# Patient Record
Sex: FEMALE | Race: OTHER | HISPANIC OR LATINO
[De-identification: names, ages, dates, MRNs, and addresses within clinical notes are randomized per-mention and may not be internally consistent; named-entity substitution may affect disease eponyms.]

---

## 2017-03-24 ENCOUNTER — APPOINTMENT (OUTPATIENT)
Dept: CARDIOLOGY | Facility: CLINIC | Age: 57
End: 2017-03-24

## 2017-03-24 DIAGNOSIS — I10 ESSENTIAL (PRIMARY) HYPERTENSION: ICD-10-CM

## 2017-03-27 ENCOUNTER — APPOINTMENT (OUTPATIENT)
Dept: CARDIOLOGY | Facility: CLINIC | Age: 57
End: 2017-03-27

## 2017-05-19 PROBLEM — I10 BENIGN ESSENTIAL HYPERTENSION: Status: RESOLVED | Noted: 2017-05-19 | Resolved: 2017-05-19

## 2018-10-08 ENCOUNTER — APPOINTMENT (OUTPATIENT)
Dept: GASTROENTEROLOGY | Facility: CLINIC | Age: 58
End: 2018-10-08

## 2019-02-22 ENCOUNTER — INPATIENT (INPATIENT)
Facility: HOSPITAL | Age: 59
LOS: 30 days | Discharge: NURSING, REHAB OR LONG-TERM CARE | DRG: 394 | End: 2019-03-25
Attending: OBSTETRICS & GYNECOLOGY | Admitting: OBSTETRICS & GYNECOLOGY
Payer: MEDICARE

## 2019-02-22 VITALS
DIASTOLIC BLOOD PRESSURE: 93 MMHG | WEIGHT: 136.03 LBS | TEMPERATURE: 97 F | SYSTOLIC BLOOD PRESSURE: 141 MMHG | RESPIRATION RATE: 17 BRPM | OXYGEN SATURATION: 96 % | HEART RATE: 91 BPM | HEIGHT: 61 IN

## 2019-02-22 LAB
ALBUMIN SERPL ELPH-MCNC: 3.1 G/DL — LOW (ref 3.3–5)
ALP SERPL-CCNC: 69 U/L — SIGNIFICANT CHANGE UP (ref 40–120)
ALT FLD-CCNC: 9 U/L — LOW (ref 10–45)
ANION GAP SERPL CALC-SCNC: 11 MMOL/L — SIGNIFICANT CHANGE UP (ref 5–17)
APTT BLD: 30.9 SEC — SIGNIFICANT CHANGE UP (ref 27.5–36.3)
AST SERPL-CCNC: 10 U/L — SIGNIFICANT CHANGE UP (ref 10–40)
BASOPHILS # BLD AUTO: 0.01 K/UL — SIGNIFICANT CHANGE UP (ref 0–0.2)
BASOPHILS NFR BLD AUTO: 0.2 % — SIGNIFICANT CHANGE UP (ref 0–2)
BILIRUB SERPL-MCNC: 0.2 MG/DL — SIGNIFICANT CHANGE UP (ref 0.2–1.2)
BUN SERPL-MCNC: 9 MG/DL — SIGNIFICANT CHANGE UP (ref 7–23)
CALCIUM SERPL-MCNC: 9.1 MG/DL — SIGNIFICANT CHANGE UP (ref 8.4–10.5)
CHLORIDE SERPL-SCNC: 102 MMOL/L — SIGNIFICANT CHANGE UP (ref 96–108)
CO2 SERPL-SCNC: 27 MMOL/L — SIGNIFICANT CHANGE UP (ref 22–31)
CREAT SERPL-MCNC: 0.78 MG/DL — SIGNIFICANT CHANGE UP (ref 0.5–1.3)
EOSINOPHIL # BLD AUTO: 0.03 K/UL — SIGNIFICANT CHANGE UP (ref 0–0.5)
EOSINOPHIL NFR BLD AUTO: 0.6 % — SIGNIFICANT CHANGE UP (ref 0–6)
GLUCOSE SERPL-MCNC: 197 MG/DL — HIGH (ref 70–99)
HCT VFR BLD CALC: 26.2 % — LOW (ref 34.5–45)
HGB BLD-MCNC: 8.3 G/DL — LOW (ref 11.5–15.5)
IMM GRANULOCYTES NFR BLD AUTO: 0.6 % — SIGNIFICANT CHANGE UP (ref 0–1.5)
INR BLD: 1.06 — SIGNIFICANT CHANGE UP (ref 0.88–1.16)
LYMPHOCYTES # BLD AUTO: 1.2 K/UL — SIGNIFICANT CHANGE UP (ref 1–3.3)
LYMPHOCYTES # BLD AUTO: 24.5 % — SIGNIFICANT CHANGE UP (ref 13–44)
MCHC RBC-ENTMCNC: 29.2 PG — SIGNIFICANT CHANGE UP (ref 27–34)
MCHC RBC-ENTMCNC: 31.7 GM/DL — LOW (ref 32–36)
MCV RBC AUTO: 92.3 FL — SIGNIFICANT CHANGE UP (ref 80–100)
MONOCYTES # BLD AUTO: 0.62 K/UL — SIGNIFICANT CHANGE UP (ref 0–0.9)
MONOCYTES NFR BLD AUTO: 12.7 % — SIGNIFICANT CHANGE UP (ref 2–14)
NEUTROPHILS # BLD AUTO: 3.01 K/UL — SIGNIFICANT CHANGE UP (ref 1.8–7.4)
NEUTROPHILS NFR BLD AUTO: 61.4 % — SIGNIFICANT CHANGE UP (ref 43–77)
NRBC # BLD: 0 /100 WBCS — SIGNIFICANT CHANGE UP (ref 0–0)
PLATELET # BLD AUTO: 424 K/UL — HIGH (ref 150–400)
POTASSIUM SERPL-MCNC: 3.4 MMOL/L — LOW (ref 3.5–5.3)
POTASSIUM SERPL-SCNC: 3.4 MMOL/L — LOW (ref 3.5–5.3)
PROT SERPL-MCNC: 6.7 G/DL — SIGNIFICANT CHANGE UP (ref 6–8.3)
PROTHROM AB SERPL-ACNC: 12 SEC — SIGNIFICANT CHANGE UP (ref 10–12.9)
RBC # BLD: 2.84 M/UL — LOW (ref 3.8–5.2)
RBC # FLD: 13.9 % — SIGNIFICANT CHANGE UP (ref 10.3–14.5)
RH IG SCN BLD-IMP: POSITIVE — SIGNIFICANT CHANGE UP
RH IG SCN BLD-IMP: POSITIVE — SIGNIFICANT CHANGE UP
SODIUM SERPL-SCNC: 140 MMOL/L — SIGNIFICANT CHANGE UP (ref 135–145)
WBC # BLD: 4.9 K/UL — SIGNIFICANT CHANGE UP (ref 3.8–10.5)
WBC # FLD AUTO: 4.9 K/UL — SIGNIFICANT CHANGE UP (ref 3.8–10.5)

## 2019-02-22 PROCEDURE — 99223 1ST HOSP IP/OBS HIGH 75: CPT

## 2019-02-23 LAB
ALBUMIN SERPL ELPH-MCNC: 2.3 G/DL — LOW (ref 3.3–5)
ALBUMIN SERPL ELPH-MCNC: 2.3 G/DL — LOW (ref 3.3–5)
ALP SERPL-CCNC: 57 U/L — SIGNIFICANT CHANGE UP (ref 40–120)
ALT FLD-CCNC: 6 U/L — LOW (ref 10–45)
ANION GAP SERPL CALC-SCNC: 14 MMOL/L — SIGNIFICANT CHANGE UP (ref 5–17)
APPEARANCE UR: CLEAR — SIGNIFICANT CHANGE UP
APTT BLD: 26.8 SEC — LOW (ref 27.5–36.3)
AST SERPL-CCNC: 9 U/L — LOW (ref 10–40)
BILIRUB SERPL-MCNC: 0.4 MG/DL — SIGNIFICANT CHANGE UP (ref 0.2–1.2)
BILIRUB UR-MCNC: NEGATIVE — SIGNIFICANT CHANGE UP
BLD GP AB SCN SERPL QL: NEGATIVE — SIGNIFICANT CHANGE UP
BLD GP AB SCN SERPL QL: NEGATIVE — SIGNIFICANT CHANGE UP
BUN SERPL-MCNC: 7 MG/DL — SIGNIFICANT CHANGE UP (ref 7–23)
CALCIUM SERPL-MCNC: 8.4 MG/DL — SIGNIFICANT CHANGE UP (ref 8.4–10.5)
CHLORIDE SERPL-SCNC: 101 MMOL/L — SIGNIFICANT CHANGE UP (ref 96–108)
CO2 SERPL-SCNC: 23 MMOL/L — SIGNIFICANT CHANGE UP (ref 22–31)
COLOR SPEC: YELLOW — SIGNIFICANT CHANGE UP
CREAT SERPL-MCNC: 0.54 MG/DL — SIGNIFICANT CHANGE UP (ref 0.5–1.3)
DIFF PNL FLD: NEGATIVE — SIGNIFICANT CHANGE UP
GLUCOSE BLDC GLUCOMTR-MCNC: 130 MG/DL — HIGH (ref 70–99)
GLUCOSE BLDC GLUCOMTR-MCNC: 133 MG/DL — HIGH (ref 70–99)
GLUCOSE BLDC GLUCOMTR-MCNC: 140 MG/DL — HIGH (ref 70–99)
GLUCOSE BLDC GLUCOMTR-MCNC: 175 MG/DL — HIGH (ref 70–99)
GLUCOSE BLDC GLUCOMTR-MCNC: 188 MG/DL — HIGH (ref 70–99)
GLUCOSE SERPL-MCNC: 200 MG/DL — HIGH (ref 70–99)
GLUCOSE UR QL: NEGATIVE — SIGNIFICANT CHANGE UP
HBA1C BLD-MCNC: 6.9 % — HIGH (ref 4–5.6)
HCT VFR BLD CALC: 23.8 % — LOW (ref 34.5–45)
HCV AB S/CO SERPL IA: 0.14 S/CO — SIGNIFICANT CHANGE UP
HCV AB SERPL-IMP: SIGNIFICANT CHANGE UP
HGB BLD-MCNC: 7.2 G/DL — LOW (ref 11.5–15.5)
INR BLD: 1.13 — SIGNIFICANT CHANGE UP (ref 0.88–1.16)
KETONES UR-MCNC: NEGATIVE — SIGNIFICANT CHANGE UP
LEUKOCYTE ESTERASE UR-ACNC: ABNORMAL
MAGNESIUM SERPL-MCNC: 1.4 MG/DL — LOW (ref 1.6–2.6)
MCHC RBC-ENTMCNC: 28.7 PG — SIGNIFICANT CHANGE UP (ref 27–34)
MCHC RBC-ENTMCNC: 30.3 GM/DL — LOW (ref 32–36)
MCV RBC AUTO: 94.8 FL — SIGNIFICANT CHANGE UP (ref 80–100)
NITRITE UR-MCNC: NEGATIVE — SIGNIFICANT CHANGE UP
NRBC # BLD: 0 /100 WBCS — SIGNIFICANT CHANGE UP (ref 0–0)
PH UR: 6 — SIGNIFICANT CHANGE UP (ref 5–8)
PHOSPHATE SERPL-MCNC: 2.7 MG/DL — SIGNIFICANT CHANGE UP (ref 2.5–4.5)
PLATELET # BLD AUTO: 359 K/UL — SIGNIFICANT CHANGE UP (ref 150–400)
POTASSIUM SERPL-MCNC: 3.6 MMOL/L — SIGNIFICANT CHANGE UP (ref 3.5–5.3)
POTASSIUM SERPL-SCNC: 3.6 MMOL/L — SIGNIFICANT CHANGE UP (ref 3.5–5.3)
PROT SERPL-MCNC: 5.1 G/DL — LOW (ref 6–8.3)
PROT UR-MCNC: NEGATIVE MG/DL — SIGNIFICANT CHANGE UP
PROTHROM AB SERPL-ACNC: 12.8 SEC — SIGNIFICANT CHANGE UP (ref 10–12.9)
RBC # BLD: 2.51 M/UL — LOW (ref 3.8–5.2)
RBC # FLD: 14.1 % — SIGNIFICANT CHANGE UP (ref 10.3–14.5)
SODIUM SERPL-SCNC: 138 MMOL/L — SIGNIFICANT CHANGE UP (ref 135–145)
SP GR SPEC: <=1.005 — SIGNIFICANT CHANGE UP (ref 1–1.03)
UROBILINOGEN FLD QL: 0.2 E.U./DL — SIGNIFICANT CHANGE UP
WBC # BLD: 3.14 K/UL — LOW (ref 3.8–10.5)
WBC # FLD AUTO: 3.14 K/UL — LOW (ref 3.8–10.5)

## 2019-02-23 PROCEDURE — 72196 MRI PELVIS W/DYE: CPT | Mod: 26

## 2019-02-23 RX ORDER — GLUCAGON INJECTION, SOLUTION 0.5 MG/.1ML
1 INJECTION, SOLUTION SUBCUTANEOUS ONCE
Qty: 0 | Refills: 0 | Status: DISCONTINUED | OUTPATIENT
Start: 2019-02-23 | End: 2019-03-25

## 2019-02-23 RX ORDER — DEXTROSE 50 % IN WATER 50 %
25 SYRINGE (ML) INTRAVENOUS ONCE
Qty: 0 | Refills: 0 | Status: DISCONTINUED | OUTPATIENT
Start: 2019-02-23 | End: 2019-03-25

## 2019-02-23 RX ORDER — MAGNESIUM SULFATE 500 MG/ML
4 VIAL (ML) INJECTION ONCE
Qty: 0 | Refills: 0 | Status: COMPLETED | OUTPATIENT
Start: 2019-02-23 | End: 2019-02-23

## 2019-02-23 RX ORDER — SODIUM CHLORIDE 9 MG/ML
1000 INJECTION, SOLUTION INTRAVENOUS
Qty: 0 | Refills: 0 | Status: DISCONTINUED | OUTPATIENT
Start: 2019-02-23 | End: 2019-03-17

## 2019-02-23 RX ORDER — ZINC OXIDE 200 MG/G
1 OINTMENT TOPICAL EVERY 6 HOURS
Qty: 0 | Refills: 0 | Status: DISCONTINUED | OUTPATIENT
Start: 2019-02-23 | End: 2019-02-27

## 2019-02-23 RX ORDER — DEXTROSE 50 % IN WATER 50 %
12.5 SYRINGE (ML) INTRAVENOUS ONCE
Qty: 0 | Refills: 0 | Status: DISCONTINUED | OUTPATIENT
Start: 2019-02-23 | End: 2019-03-14

## 2019-02-23 RX ORDER — DEXTROSE 50 % IN WATER 50 %
25 SYRINGE (ML) INTRAVENOUS ONCE
Qty: 0 | Refills: 0 | Status: DISCONTINUED | OUTPATIENT
Start: 2019-02-23 | End: 2019-03-14

## 2019-02-23 RX ORDER — DEXTROSE 50 % IN WATER 50 %
15 SYRINGE (ML) INTRAVENOUS ONCE
Qty: 0 | Refills: 0 | Status: DISCONTINUED | OUTPATIENT
Start: 2019-02-23 | End: 2019-03-14

## 2019-02-23 RX ORDER — INSULIN LISPRO 100/ML
VIAL (ML) SUBCUTANEOUS
Qty: 0 | Refills: 0 | Status: DISCONTINUED | OUTPATIENT
Start: 2019-02-23 | End: 2019-03-25

## 2019-02-23 RX ORDER — DIPHENHYDRAMINE HCL 50 MG
25 CAPSULE ORAL ONCE
Qty: 0 | Refills: 0 | Status: COMPLETED | OUTPATIENT
Start: 2019-02-23 | End: 2019-02-23

## 2019-02-23 RX ORDER — ACETAMINOPHEN 500 MG
1000 TABLET ORAL ONCE
Qty: 0 | Refills: 0 | Status: COMPLETED | OUTPATIENT
Start: 2019-02-23 | End: 2019-02-23

## 2019-02-23 RX ORDER — SODIUM CHLORIDE 9 MG/ML
1000 INJECTION, SOLUTION INTRAVENOUS
Qty: 0 | Refills: 0 | Status: DISCONTINUED | OUTPATIENT
Start: 2019-02-23 | End: 2019-03-14

## 2019-02-23 RX ORDER — OCTREOTIDE ACETATE 200 UG/ML
50 INJECTION, SOLUTION INTRAVENOUS; SUBCUTANEOUS ONCE
Qty: 0 | Refills: 0 | Status: COMPLETED | OUTPATIENT
Start: 2019-02-23 | End: 2019-02-23

## 2019-02-23 RX ORDER — OXYCODONE HYDROCHLORIDE 5 MG/1
5 TABLET ORAL EVERY 6 HOURS
Qty: 0 | Refills: 0 | Status: DISCONTINUED | OUTPATIENT
Start: 2019-02-23 | End: 2019-02-27

## 2019-02-23 RX ORDER — ACETAMINOPHEN 500 MG
650 TABLET ORAL EVERY 6 HOURS
Qty: 0 | Refills: 0 | Status: DISCONTINUED | OUTPATIENT
Start: 2019-02-23 | End: 2019-03-02

## 2019-02-23 RX ADMIN — Medication 25 MILLIGRAM(S): at 09:38

## 2019-02-23 RX ADMIN — Medication 1000 MILLIGRAM(S): at 10:50

## 2019-02-23 RX ADMIN — ZINC OXIDE 1 APPLICATION(S): 200 OINTMENT TOPICAL at 13:00

## 2019-02-23 RX ADMIN — Medication 100 GRAM(S): at 18:29

## 2019-02-23 RX ADMIN — Medication 400 MILLIGRAM(S): at 09:38

## 2019-02-23 RX ADMIN — ZINC OXIDE 1 APPLICATION(S): 200 OINTMENT TOPICAL at 18:44

## 2019-02-23 RX ADMIN — Medication 2: at 11:52

## 2019-02-23 RX ADMIN — OCTREOTIDE ACETATE 50 MICROGRAM(S): 200 INJECTION, SOLUTION INTRAVENOUS; SUBCUTANEOUS at 10:58

## 2019-02-23 NOTE — PROGRESS NOTE ADULT - SUBJECTIVE AND OBJECTIVE BOX
Pt seen and examined at bedside. She denies abdominal pain, nausea, vomiting.  She continues to be incontinent of urine and has stool leakage per vagina.  She has been tolerating regular diet.      T(F): 97 (02-23-19 @ 09:15), Max: 99 (02-23-19 @ 05:22)  HR: 93 (02-23-19 @ 09:15) (79 - 93)  BP: 145/85 (02-23-19 @ 09:15) (113/77 - 145/85)  RR: 15 (02-23-19 @ 09:15) (15 - 17)  SpO2: 96% (02-23-19 @ 09:15) (96% - 97%)  Wt(kg): --  I&O's Summary    22 Feb 2019 07:01  -  23 Feb 2019 07:00  --------------------------------------------------------  IN: 0 mL / OUT: 250 mL / NET: -250 mL        MEDICATIONS  (STANDING):  dextrose 5%. 1000 milliLiter(s) (50 mL/Hr) IV Continuous <Continuous>  dextrose 50% Injectable 12.5 Gram(s) IV Push once  dextrose 50% Injectable 25 Gram(s) IV Push once  dextrose 50% Injectable 25 Gram(s) IV Push once  insulin lispro (HumaLOG) corrective regimen sliding scale   SubCutaneous Before meals and at bedtime    MEDICATIONS  (PRN):  dextrose 40% Gel 15 Gram(s) Oral once PRN Blood Glucose LESS THAN 70 milliGRAM(s)/deciliter  glucagon  Injectable 1 milliGRAM(s) IntraMuscular once PRN Glucose LESS THAN 70 milligrams/deciliter      Constitutional: Alert & Oriented x3, No acute distress  Respiratory:  regular work of breathing, CTAB  Cardiovascular: regular rate and rhythm, no murmurs, or gallops  Gastrointestinal: soft, non tender, positive bowel sounds, no rebound or guarding, left ostomy perfused well with minimal stool output in bag; midline scar visulized  Pelvic exam: deferred due to patient's discomfort   Extremities: no calf tenderness or swelling  LABS:                        8.3    4.90  )-----------( 424      ( 22 Feb 2019 22:07 )             26.2     02-22    140  |  102  |  9   ----------------------------<  197<H>  3.4<L>   |  27  |  0.78    Ca    9.1      22 Feb 2019 22:07    TPro  6.7  /  Alb  3.1<L>  /  TBili  0.2  /  DBili  x   /  AST  10  /  ALT  9<L>  /  AlkPhos  69  02-22    PT/INR - ( 22 Feb 2019 22:07 )   PT: 12.0 sec;   INR: 1.06          PTT - ( 22 Feb 2019 22:07 )  PTT:30.9 sec  Urinalysis Basic - ( 23 Feb 2019 02:45 )    Color: Yellow / Appearance: Clear / SG: <=1.005 / pH: x  Gluc: x / Ketone: NEGATIVE  / Bili: Negative / Urobili: 0.2 E.U./dL   Blood: x / Protein: NEGATIVE mg/dL / Nitrite: NEGATIVE   Leuk Esterase: Moderate / RBC: < 5 /HPF / WBC > 10 /HPF   Sq Epi: x / Non Sq Epi: 0-5 /HPF / Bacteria: Present /HPF        RADIOLOGY & ADDITIONAL TESTS:

## 2019-02-23 NOTE — PROGRESS NOTE ADULT - SUBJECTIVE AND OBJECTIVE BOX
GYN PROGRESS NOTE    Patient evaluated at the bedside. MRI performed. Patient reports that she feels much better at this time. Her pain is improved and she no longer has any itching. The leaking is much less.  She denies CP/SOB/dizziness/nausea/vomiting/abdominal pain/calf pain. Patient is hungry and would like to eat but understands that she cannot.     O:   T(C): 36 (02-23-19 @ 15:26)  HR: 111 (02-23-19 @ 15:26)    BP: 123/81((02-23-19 @ 15:26)  RR: 16 (02-23-19 @ 15:26)  SpO2: 94% (02-23-19 @ 20:38) (94% - 97%)  Wt(kg): --    GEN: resting comfortably   LUNGS: no respiratory distress  ABD: soft, nontender, +bowel sounds   Pelvic: joseph draining clear urine, small fecal stained discharge on peripad; no stool on patient's skin, vulva are erythematous but much less edematous   EXT: no calf tenderness        02-22 @ 07:01  -  02-23 @ 07:00  --------------------------------------------------------  IN: 0 mL / OUT: 250 mL / NET: -250 mL    02-23 @ 07:01  -  02-23 @ 22:17  --------------------------------------------------------  IN: 1140 mL / OUT: 1050 mL / NET: 90 mL

## 2019-02-23 NOTE — PROGRESS NOTE ADULT - ASSESSMENT
Assessment and Plan: 60yo G0 with multiple medical comorbidities admitted for management of symptomatic rectovaginal and enterovaginal fistulas identified at an outside hospital in the setting of Stage 4a endometrioid adenocarcinoma - HD2.     1. ID: Patient remains afebrile. Urine culture pending.   2. FEN/GI - Will make patient NPO, start LR at 100 cc / hr. Will give  IV Octreotide x 1 dose today. Electrolytes pending - will replete as needed. Plan for MR pelvis with oral and IV contrast.   3. PAIN - Patient declining narcotics at this time. Will give IV Tylenol.   4.  - Westbrook placed with immediate output of >600 cc of urine. Will apply Periguard to vulva and perineum for comfort. General surgery consulted - appreciate recommendations.   5. Endocrine- ISS while patient remains NPO. Holding home metformin at this time.   6. RESP-  No acute issues.   7. LABS -  Daily labs.    8. Gyn Onc: Plan for palliative care consult on Monday with goals of care discussion with Gyn Onc attending Dr. Spivey.       VTE prophylaxis - SCDs, ambulate as tolerated. Will consider chemical VTE on 2/24 once management plan is further elucidated.

## 2019-02-23 NOTE — CONSULT NOTE ADULT - SUBJECTIVE AND OBJECTIVE BOX
60yo G0 with PMHx significant for breast cancer, diabetes mellitus, endometrial cancer stage 3, s/p exploratory laparotomy, enterolysis, SHAMIR, BSO, pelvic lymphadenectomy, low anterior resection mobilization of splenic flexure, end colostomy on 7/30/18 now with rectovaginal fistula, transferred to [facility] from [facility] for care with Dr. Spivey.  Pt seen and examined at bedside. She denies abdominal pain, nausea, vomiting.  She continues to be incontinent of urine and has stool leakage per vagina.  She has been tolerating regular diet.        Vital Signs Last 24 Hrs  T(C): 36.1 (23 Feb 2019 09:15), Max: 37.2 (23 Feb 2019 05:22)  T(F): 97 (23 Feb 2019 09:15), Max: 99 (23 Feb 2019 05:22)  HR: 93 (23 Feb 2019 09:15) (79 - 93)  BP: 145/85 (23 Feb 2019 09:15) (113/77 - 145/85)  BP(mean): --  RR: 15 (23 Feb 2019 09:15) (15 - 17)  SpO2: 96% (23 Feb 2019 09:15) (96% - 97%)    I&O's Summary    22 Feb 2019 07:01  -  23 Feb 2019 07:00  --------------------------------------------------------  IN: 0 mL / OUT: 250 mL / NET: -250 mL      Gen: Alert & Oriented x3, No acute distress  Respiratory:  CTAB  Cardiovascular: RRR  Gastrointestinal: soft, non tender, left ostomy w/ stool   Extremities: no swelling / cyanosis or edema       LABS:                        7.2    3.14  )-----------( 359      ( 23 Feb 2019 10:35 )             23.8               02-23    138  |  101  |  7   ----------------------------<  200<H>  3.6   |  23  |  0.54    Ca    8.4      23 Feb 2019 10:35  Phos  2.7     02-23  Mg     1.4     02-23    TPro  5.1<L>  /  Alb  2.3<L>  /  TBili  0.4  /  DBili  x   /  AST  9<L>  /  ALT  6<L>  /  AlkPhos  57  02-23  RADIOLOGY & ADDITIONAL TESTS: 60yo G0 with PMHx significant for breast cancer, diabetes mellitus, endometrial cancer stage 3, s/p exploratory laparotomy, enterolysis, SHAMIR, BSO, pelvic lymphadenectomy, low anterior resection mobilization of splenic flexure, end colostomy on 7/30/18 now with rectovaginal fistula, transferred to [facility] from [facility] for care with Dr. Spivey.  Pt seen and examined at bedside. She denies abdominal pain, nausea, vomiting.  She continues to be incontinent of urine and has stool leakage per vagina.  She has been tolerating regular diet.        Vital Signs Last 24 Hrs  T(C): 36.1 (23 Feb 2019 09:15), Max: 37.2 (23 Feb 2019 05:22)  T(F): 97 (23 Feb 2019 09:15), Max: 99 (23 Feb 2019 05:22)  HR: 93 (23 Feb 2019 09:15) (79 - 93)  BP: 145/85 (23 Feb 2019 09:15) (113/77 - 145/85)  BP(mean): --  RR: 15 (23 Feb 2019 09:15) (15 - 17)  SpO2: 96% (23 Feb 2019 09:15) (96% - 97%)    I&O's Summary    22 Feb 2019 07:01  -  23 Feb 2019 07:00  --------------------------------------------------------  IN: 0 mL / OUT: 250 mL / NET: -250 mL      Gen: Alert & Oriented x3, No acute distress  Respiratory:  CTAB  Cardiovascular: RRR  Gastrointestinal: soft, non tender, left ostomy w/ stool   Extremities: no swelling / cyanosis or edema       LABS:                        7.2    3.14  )-----------( 359      ( 23 Feb 2019 10:35 )             23.8               02-23    138  |  101  |  7   ----------------------------<  200<H>  3.6   |  23  |  0.54    Ca    8.4      23 Feb 2019 10:35  Phos  2.7     02-23  Mg     1.4     02-23    TPro  5.1<L>  /  Alb  2.3<L>  /  TBili  0.4  /  DBili  x   /  AST  9<L>  /  ALT  6<L>  /  AlkPhos  57  02-23  RADIOLOGY & ADDITIONAL TESTS:    CT scan OSH --> concern for enterovaginal fistula and rectovaginal fistula

## 2019-02-23 NOTE — CONSULT NOTE ADULT - ATTENDING COMMENTS
as above  pt seen examnd by ne personally  Abdom  nontender  ostomy fct  pt states stool per vagina for ~ 1 wk  ABX   Elucidate GI connection to vagina w/ further imaging      will need OR as above  pt seen examnd by ne personally w/ res  Abdom  nontender  ostomy fct  pt states stool per vagina for ~ 1 wk  ABX   Elucidate GI connection to vagina w/ further imaging      will need OR

## 2019-02-23 NOTE — CONSULT NOTE ADULT - ASSESSMENT
58yo G0 with PMHx significant for breast cancer, diabetes mellitus, endometrial cancer stage 3, s/p SHAMIR, BSO, pelvic lymphadenectomy, low anterior resection w/ end colostomy on 7/30/18 now with rectovaginal fistula, transferred to [facility] from [facility] for care with Dr. Spivey.  - would upload CT scans from OSH  - agree w/ further imaging  - surgical plan for MRI for further understanding of recto vaginal fistula anatomy   - team 4c following 60yo G0 with PMHx significant for breast cancer, diabetes mellitus, endometrial cancer stage 3, s/p SHAMIR, BSO, pelvic lymphadenectomy, low anterior resection w/ end colostomy on 7/30/18 now with rectovaginal fistula, transferred to [facility] from [facility] for care with Dr. Spivey.  - wouls start abx for positive UTI, likely secondary to fistula  - would upload CT scans from OSH  - agree w/ further imaging  - surgical plan for MRI for further understanding of recto vaginal fistula anatomy   - team 4c following

## 2019-02-23 NOTE — H&P ADULT - HISTORY OF PRESENT ILLNESS
58yo G0 with PMHx significant for breast cancer, diabetes mellitus, endometrial cancer stage 3, s/p exploratory laparotomy, enterolysis, SHAMIR, BSO, pelvic lymphadenectomy, low anterior resection mobilization of splenic flexure, end colostomy on 7/30/18 now with rectovaginal fistula.  She was admitted to [facility] one week ago for treatment of UTI, and now transferred to [facility] for further care.     Pt denies fever, chills, chest pain, SOB, abdominal pain, nausea, vomiting, vaginal bleeding.  She reports having pain and discomfort around her perineum.  She reports being completely incontinent of urine.  Approximately 1 week ago she had leakage of stool from vagina, and was subsequently admitted to [place] where she reports receiving IV antibiotics.  She has not been ambulatory for the past month since discharge from her rehab facility.  She has undergone one round of chemotherapy approximately 3 weeks ago; to complete second cycle of chemotherapy this monday, 2/25.  Enterovaginal and rectovaginal fistulization found on MRI on 2/18/19 at [facility].    OB/GYN Hx: G0, endometrial cancer dx in 7/2018; h/o breast cancer in 2009 s/p chemo and radiation with left breast lumpectomy  PMHx: T2DM, polio in childhood, h/o breast cancer  SHx: left breast lumpectomy, right knee surgery with screw placement 2001, 7/2018 exploratory laparotomy, enterolysis, SHAMIR, BSO, pelvic lymphadenectomy, low anterior resection mobilization of splenic flexure, end colostomy   Meds: metformin 31964 BID, nexium  Allergies: NKDA    PHYSICAL EXAM:   Vital Signs Last 24 Hrs  T(C): 36.2 (22 Feb 2019 20:10), Max: 36.2 (22 Feb 2019 20:10)  T(F): 97.2 (22 Feb 2019 20:10), Max: 97.2 (22 Feb 2019 20:10)  HR: 91 (22 Feb 2019 20:10) (91 - 91)  BP: 141/93 (22 Feb 2019 20:10) (141/93 - 141/93)  BP(mean): --  RR: 17 (22 Feb 2019 20:10) (17 - 17)  SpO2: 96% (22 Feb 2019 20:10) (96% - 96%)    **************************  Constitutional: Alert & Oriented x3, No acute distress  Respiratory:  regular work of breathing, CTAB  Cardiovascular: regular rate and rhythm, no murmurs, or gallops  Gastrointestinal: soft, non tender, positive bowel sounds, no rebound or guarding, left ostomy perfused well with minimal stool output in bag; midline scar visulized  Pelvic exam: deferred due to patient's discomfort   Extremities: no calf tenderness or swelling      LABS:                        8.3    4.90  )-----------( 424      ( 22 Feb 2019 22:07 )             26.2     02-22    140  |  102  |  9   ----------------------------<  197<H>  3.4<L>   |  27  |  0.78    Ca    9.1      22 Feb 2019 22:07    TPro  6.7  /  Alb  3.1<L>  /  TBili  0.2  /  DBili  x   /  AST  10  /  ALT  9<L>  /  AlkPhos  69  02-22    PT/INR - ( 22 Feb 2019 22:07 )   PT: 12.0 sec;   INR: 1.06          PTT - ( 22 Feb 2019 22:07 )  PTT:30.9 sec

## 2019-02-23 NOTE — H&P ADULT - ASSESSMENT
58yo G0 with PMHx significant for breast cancer, diabetes mellitus, endometrial cancer stage 3, s/p exploratory laparotomy, enterolysis, SHAMIR, BSO, pelvic lymphadenectomy, low anterior resection mobilization of splenic flexure, end colostomy on 7/30/18 now with rectovaginal fistula.   - patient admitted to GYn service under Dr. Spivey  - regular diet  - UA, urine culture via catheterized specimen due to incontinence and liklihood for contaminated specimen

## 2019-02-23 NOTE — PROGRESS NOTE ADULT - ASSESSMENT
Assessment and Plan:   60yo G0 with multiple medical comorbidities admitted for management of symptomatic rectovaginal and enterovaginal fistulas in the setting of Stage 4a endometrioid adenocarcinoma.     1. ID: Patient remains afebrile. Urine culture pending.   2. FEN/GI - NPO for now. LR at 100 cc / hr. Will replete Magnesium now. Replete electrolytes PRN. Status post IV Octreotide x 1 dose today. Patient with poor nutritional status- will consult Nutrition for possible PPN.   3. PAIN - No pain at this time. Oral pain medications as needed.   4.  - Westbrook in place draining copious clear urine. Leakage from fistula greatly decreased. Small leakage contained on peripad. Continue Periguard to vulva and perineum for comfort. General surgery consulted - appreciate recommendations.   5. Endocrine- ISS while patient remains NPO. Holding home metformin at this time.   6. RESP-  No acute issues.   7. LABS -  Daily labs. Mg to be repleted.   8. Gyn Onc: Plan for palliative care consult on Monday with goals of care discussion with Gyn Onc attending Dr. Spivey.       VTE prophylaxis - SCDs, ambulate as tolerated. Will consider chemical VTE on 2/24 once management plan is further elucidated.

## 2019-02-23 NOTE — PROGRESS NOTE ADULT - SUBJECTIVE AND OBJECTIVE BOX
GYN PROGRESS NOTE    Patient evaluated at the bedside with attending Dr. Spivey. Patient visibly distressed and complaining of severe pain and significant leaking. Patient reports burning of her perineum and vulva with itching. Patient denies CP/SOB/dizziness/nausea/vomiting/calf pain.  Patient agrees to pain medication and benadryl at this time.     O:   GEN: patient appears distressed, crying   LUNGS: no respiratory distress  ABD: soft, nontender   Pelvic: vulva is edematous, erythematous and raw appearing, visible leakage of stool at introitus and all over chux pad, no blood visible, 1 cm defect palpated in anterior vaginal wall   EXT: no calf tenderness

## 2019-02-23 NOTE — PROGRESS NOTE ADULT - ASSESSMENT
60yo G0 with PMHx significant for breast cancer, diabetes mellitus, endometrial cancer stage 3, s/p exploratory laparotomy, enterolysis, SHAMIR, BSO, pelvic lymphadenectomy, low anterior resection mobilization of splenic flexure, end colostomy on 7/30/18 now with rectovaginal fistula, transferred to [facility] from [facility] for care with Dr. Spivey.  - consult Dr. Arias with gen surg regarding further reommedations for surgical management of rectovaginal fistula  - regular diet  - f/u urine culture  - electrolyte repletion

## 2019-02-24 LAB
ANION GAP SERPL CALC-SCNC: 12 MMOL/L — SIGNIFICANT CHANGE UP (ref 5–17)
BUN SERPL-MCNC: 8 MG/DL — SIGNIFICANT CHANGE UP (ref 7–23)
CALCIUM SERPL-MCNC: 9 MG/DL — SIGNIFICANT CHANGE UP (ref 8.4–10.5)
CHLORIDE SERPL-SCNC: 100 MMOL/L — SIGNIFICANT CHANGE UP (ref 96–108)
CO2 SERPL-SCNC: 28 MMOL/L — SIGNIFICANT CHANGE UP (ref 22–31)
CREAT SERPL-MCNC: 0.56 MG/DL — SIGNIFICANT CHANGE UP (ref 0.5–1.3)
CULTURE RESULTS: NO GROWTH — SIGNIFICANT CHANGE UP
GLUCOSE BLDC GLUCOMTR-MCNC: 101 MG/DL — HIGH (ref 70–99)
GLUCOSE BLDC GLUCOMTR-MCNC: 114 MG/DL — HIGH (ref 70–99)
GLUCOSE BLDC GLUCOMTR-MCNC: 129 MG/DL — HIGH (ref 70–99)
GLUCOSE SERPL-MCNC: 132 MG/DL — HIGH (ref 70–99)
HCT VFR BLD CALC: 25.2 % — LOW (ref 34.5–45)
HGB BLD-MCNC: 7.5 G/DL — LOW (ref 11.5–15.5)
MAGNESIUM SERPL-MCNC: 2.3 MG/DL — SIGNIFICANT CHANGE UP (ref 1.6–2.6)
MCHC RBC-ENTMCNC: 28.2 PG — SIGNIFICANT CHANGE UP (ref 27–34)
MCHC RBC-ENTMCNC: 29.8 GM/DL — LOW (ref 32–36)
MCV RBC AUTO: 94.7 FL — SIGNIFICANT CHANGE UP (ref 80–100)
NRBC # BLD: 0 /100 WBCS — SIGNIFICANT CHANGE UP (ref 0–0)
PHOSPHATE SERPL-MCNC: 3.7 MG/DL — SIGNIFICANT CHANGE UP (ref 2.5–4.5)
PLATELET # BLD AUTO: 351 K/UL — SIGNIFICANT CHANGE UP (ref 150–400)
POTASSIUM SERPL-MCNC: 3.7 MMOL/L — SIGNIFICANT CHANGE UP (ref 3.5–5.3)
POTASSIUM SERPL-SCNC: 3.7 MMOL/L — SIGNIFICANT CHANGE UP (ref 3.5–5.3)
RBC # BLD: 2.66 M/UL — LOW (ref 3.8–5.2)
RBC # FLD: 14.3 % — SIGNIFICANT CHANGE UP (ref 10.3–14.5)
SODIUM SERPL-SCNC: 140 MMOL/L — SIGNIFICANT CHANGE UP (ref 135–145)
SPECIMEN SOURCE: SIGNIFICANT CHANGE UP
WBC # BLD: 4.68 K/UL — SIGNIFICANT CHANGE UP (ref 3.8–10.5)
WBC # FLD AUTO: 4.68 K/UL — SIGNIFICANT CHANGE UP (ref 3.8–10.5)

## 2019-02-24 PROCEDURE — 99232 SBSQ HOSP IP/OBS MODERATE 35: CPT

## 2019-02-24 RX ORDER — OCTREOTIDE ACETATE 200 UG/ML
50 INJECTION, SOLUTION INTRAVENOUS; SUBCUTANEOUS ONCE
Qty: 0 | Refills: 0 | Status: COMPLETED | OUTPATIENT
Start: 2019-02-24 | End: 2019-02-24

## 2019-02-24 RX ORDER — ENOXAPARIN SODIUM 100 MG/ML
40 INJECTION SUBCUTANEOUS DAILY
Qty: 0 | Refills: 0 | Status: DISCONTINUED | OUTPATIENT
Start: 2019-02-24 | End: 2019-02-27

## 2019-02-24 RX ORDER — NYSTATIN CREAM 100000 [USP'U]/G
1 CREAM TOPICAL
Qty: 0 | Refills: 0 | Status: DISCONTINUED | OUTPATIENT
Start: 2019-02-24 | End: 2019-02-27

## 2019-02-24 RX ADMIN — SODIUM CHLORIDE 100 MILLILITER(S): 9 INJECTION, SOLUTION INTRAVENOUS at 01:19

## 2019-02-24 RX ADMIN — Medication 650 MILLIGRAM(S): at 12:46

## 2019-02-24 RX ADMIN — Medication 650 MILLIGRAM(S): at 13:45

## 2019-02-24 RX ADMIN — Medication 650 MILLIGRAM(S): at 06:51

## 2019-02-24 RX ADMIN — ZINC OXIDE 1 APPLICATION(S): 200 OINTMENT TOPICAL at 01:19

## 2019-02-24 RX ADMIN — NYSTATIN CREAM 1 APPLICATION(S): 100000 CREAM TOPICAL at 23:08

## 2019-02-24 RX ADMIN — SODIUM CHLORIDE 100 MILLILITER(S): 9 INJECTION, SOLUTION INTRAVENOUS at 23:07

## 2019-02-24 RX ADMIN — Medication 650 MILLIGRAM(S): at 07:37

## 2019-02-24 RX ADMIN — ENOXAPARIN SODIUM 40 MILLIGRAM(S): 100 INJECTION SUBCUTANEOUS at 15:47

## 2019-02-24 RX ADMIN — OCTREOTIDE ACETATE 50 MICROGRAM(S): 200 INJECTION, SOLUTION INTRAVENOUS; SUBCUTANEOUS at 20:54

## 2019-02-24 RX ADMIN — Medication 650 MILLIGRAM(S): at 23:02

## 2019-02-24 NOTE — PROGRESS NOTE ADULT - SUBJECTIVE AND OBJECTIVE BOX
SUBJECTIVE:  pt seen at bedside with Dr. Judge, without complaints at this time. states had minimal stool from vagina    MEDICATIONS  (STANDING):  dextrose 5%. 1000 milliLiter(s) (50 mL/Hr) IV Continuous <Continuous>  dextrose 50% Injectable 12.5 Gram(s) IV Push once  dextrose 50% Injectable 25 Gram(s) IV Push once  dextrose 50% Injectable 25 Gram(s) IV Push once  enoxaparin Injectable 40 milliGRAM(s) SubCutaneous daily  insulin lispro (HumaLOG) corrective regimen sliding scale   SubCutaneous Before meals and at bedtime  lactated ringers. 1000 milliLiter(s) (100 mL/Hr) IV Continuous <Continuous>    MEDICATIONS  (PRN):  acetaminophen   Tablet .. 650 milliGRAM(s) Oral every 6 hours PRN Temp greater or equal to 38C (100.4F), Mild Pain (1 - 3)  dextrose 40% Gel 15 Gram(s) Oral once PRN Blood Glucose LESS THAN 70 milliGRAM(s)/deciliter  glucagon  Injectable 1 milliGRAM(s) IntraMuscular once PRN Glucose LESS THAN 70 milligrams/deciliter  oxyCODONE    IR 5 milliGRAM(s) Oral every 6 hours PRN Moderate Pain (4 - 6)  zinc oxide 20% Ointment 1 Application(s) Topical every 6 hours PRN Perineal discomfort from fistula      Vital Signs Last 24 Hrs  T(C): 36.6 (24 Feb 2019 08:30), Max: 37.1 (24 Feb 2019 00:33)  T(F): 97.9 (24 Feb 2019 08:30), Max: 98.7 (24 Feb 2019 00:33)  HR: 80 (24 Feb 2019 08:30) (69 - 111)  BP: 122/83 (24 Feb 2019 08:30) (114/76 - 131/85)  BP(mean): --  RR: 18 (24 Feb 2019 08:30) (16 - 18)  SpO2: 95% (24 Feb 2019 08:30) (94% - 97%)    PHYSICAL EXAM:      Constitutional: A&Ox3    Respiratory: non labored breathing, no respiratory distress    Cardiovascular: NSR, RRR    Gastrointestinal: soft, nontender, nondistended, ostomy in place with fecal contents    Extremities: (-) edema                  I&O's Detail    23 Feb 2019 07:01  -  24 Feb 2019 07:00  --------------------------------------------------------  IN:    lactated ringers.: 2000 mL    Oral Fluid: 240 mL  Total IN: 2240 mL    OUT:    Indwelling Catheter - Urethral: 850 mL    Intermittent Catheterization - Urethral: 650 mL  Total OUT: 1500 mL    Total NET: 740 mL          LABS:                        7.5    4.68  )-----------( 351      ( 24 Feb 2019 07:42 )             25.2     02-24    140  |  100  |  8   ----------------------------<  132<H>  3.7   |  28  |  0.56    Ca    9.0      24 Feb 2019 07:42  Phos  3.7     02-24  Mg     2.3     02-24    TPro  5.1<L>  /  Alb  2.3<L>  /  TBili  0.4  /  DBili  x   /  AST  9<L>  /  ALT  6<L>  /  AlkPhos  57  02-23    PT/INR - ( 23 Feb 2019 10:35 )   PT: 12.8 sec;   INR: 1.13          PTT - ( 23 Feb 2019 10:35 )  PTT:26.8 sec  Urinalysis Basic - ( 23 Feb 2019 02:45 )    Color: Yellow / Appearance: Clear / SG: <=1.005 / pH: x  Gluc: x / Ketone: NEGATIVE  / Bili: Negative / Urobili: 0.2 E.U./dL   Blood: x / Protein: NEGATIVE mg/dL / Nitrite: NEGATIVE   Leuk Esterase: Moderate / RBC: < 5 /HPF / WBC > 10 /HPF   Sq Epi: x / Non Sq Epi: 0-5 /HPF / Bacteria: Present /HPF        RADIOLOGY & ADDITIONAL STUDIES:

## 2019-02-24 NOTE — PROGRESS NOTE ADULT - ASSESSMENT
60yo G0 with PMHx significant for breast cancer, diabetes mellitus, endometrial cancer stage 3, s/p SHAMIR, BSO, pelvic lymphadenectomy, low anterior resection w/ end colostomy on 7/30/18 now with rectovaginal fistula, transferred to [facility] from [facility] for care with Dr. Spivey.    - abx for positive UTI, likely secondary to fistula  - MRI appreciated, reviewed with Dr. Judge  - team 4c continue to follow at this time

## 2019-02-24 NOTE — PROGRESS NOTE ADULT - SUBJECTIVE AND OBJECTIVE BOX
Pt seen and examined at bedside. Patient reports that she feels much better at this time. Her pain is improved and she no longer has any itching. The leaking is much less.  She denies CP/SOB/dizziness/nausea/vomiting/abdominal pain/calf pain. Patient is hungry and would like to eat but understands that she cannot.       T(F): 97.9 (02-24-19 @ 08:30), Max: 98.7 (02-24-19 @ 00:33)  HR: 80 (02-24-19 @ 08:30) (69 - 111)  BP: 122/83 (02-24-19 @ 08:30) (114/76 - 131/85)  RR: 18 (02-24-19 @ 08:30) (16 - 18)  SpO2: 95% (02-24-19 @ 08:30) (94% - 97%)  Wt(kg): --  I&O's Summary    23 Feb 2019 07:01  -  24 Feb 2019 07:00  --------------------------------------------------------  IN: 2240 mL / OUT: 1500 mL / NET: 740 mL        MEDICATIONS  (STANDING):  dextrose 5%. 1000 milliLiter(s) (50 mL/Hr) IV Continuous <Continuous>  dextrose 50% Injectable 12.5 Gram(s) IV Push once  dextrose 50% Injectable 25 Gram(s) IV Push once  dextrose 50% Injectable 25 Gram(s) IV Push once  enoxaparin Injectable 40 milliGRAM(s) SubCutaneous daily  insulin lispro (HumaLOG) corrective regimen sliding scale   SubCutaneous Before meals and at bedtime  lactated ringers. 1000 milliLiter(s) (100 mL/Hr) IV Continuous <Continuous>    MEDICATIONS  (PRN):  acetaminophen   Tablet .. 650 milliGRAM(s) Oral every 6 hours PRN Temp greater or equal to 38C (100.4F), Mild Pain (1 - 3)  dextrose 40% Gel 15 Gram(s) Oral once PRN Blood Glucose LESS THAN 70 milliGRAM(s)/deciliter  glucagon  Injectable 1 milliGRAM(s) IntraMuscular once PRN Glucose LESS THAN 70 milligrams/deciliter  oxyCODONE    IR 5 milliGRAM(s) Oral every 6 hours PRN Moderate Pain (4 - 6)  zinc oxide 20% Ointment 1 Application(s) Topical every 6 hours PRN Perineal discomfort from fistula      Physical Exam:  Constitutional: NAD  Pulmonary: clear to auscultation bilaterally   Cardiovascular: Regular rate and rhythm   Abdomen: incision site clean, dry, intact. Soft, mildly tender, [] distended, no guarding, no rebound, [] bowel sounds  Extremities: no lower extremity edema or calve tenderness. SCDs in place     LABS:                        7.5    4.68  )-----------( 351      ( 24 Feb 2019 07:42 )             25.2     02-24    140  |  100  |  8   ----------------------------<  132<H>  3.7   |  28  |  0.56    Ca    9.0      24 Feb 2019 07:42  Phos  3.7     02-24  Mg     2.3     02-24    TPro  5.1<L>  /  Alb  2.3<L>  /  TBili  0.4  /  DBili  x   /  AST  9<L>  /  ALT  6<L>  /  AlkPhos  57  02-23    PT/INR - ( 23 Feb 2019 10:35 )   PT: 12.8 sec;   INR: 1.13          PTT - ( 23 Feb 2019 10:35 )  PTT:26.8 sec  Urinalysis Basic - ( 23 Feb 2019 02:45 )    Color: Yellow / Appearance: Clear / SG: <=1.005 / pH: x  Gluc: x / Ketone: NEGATIVE  / Bili: Negative / Urobili: 0.2 E.U./dL   Blood: x / Protein: NEGATIVE mg/dL / Nitrite: NEGATIVE   Leuk Esterase: Moderate / RBC: < 5 /HPF / WBC > 10 /HPF   Sq Epi: x / Non Sq Epi: 0-5 /HPF / Bacteria: Present /HPF        RADIOLOGY & ADDITIONAL TESTS: Pt seen and examined at bedside. Patient reports that she feels much better than yesterday. She has not had leakage of stool today.  She reports having some discomfort with her bladder, but it resolved after the joseph was un-kinked and allowed urine to drain.  She repots her perineal pain is much improved. She denies vomiting, nausea, SOB, chest pain, abdominal pain or calf pain.    T(F): 97.9 (02-24-19 @ 08:30), Max: 98.7 (02-24-19 @ 00:33)  HR: 80 (02-24-19 @ 08:30) (69 - 111)  BP: 122/83 (02-24-19 @ 08:30) (114/76 - 131/85)  RR: 18 (02-24-19 @ 08:30) (16 - 18)  SpO2: 95% (02-24-19 @ 08:30) (94% - 97%)  Wt(kg): --  I&O's Summary    23 Feb 2019 07:01  -  24 Feb 2019 07:00  --------------------------------------------------------  IN: 2240 mL / OUT: 1500 mL / NET: 740 mL        MEDICATIONS  (STANDING):  dextrose 5%. 1000 milliLiter(s) (50 mL/Hr) IV Continuous <Continuous>  dextrose 50% Injectable 12.5 Gram(s) IV Push once  dextrose 50% Injectable 25 Gram(s) IV Push once  dextrose 50% Injectable 25 Gram(s) IV Push once  enoxaparin Injectable 40 milliGRAM(s) SubCutaneous daily  insulin lispro (HumaLOG) corrective regimen sliding scale   SubCutaneous Before meals and at bedtime  lactated ringers. 1000 milliLiter(s) (100 mL/Hr) IV Continuous <Continuous>    MEDICATIONS  (PRN):  acetaminophen   Tablet .. 650 milliGRAM(s) Oral every 6 hours PRN Temp greater or equal to 38C (100.4F), Mild Pain (1 - 3)  dextrose 40% Gel 15 Gram(s) Oral once PRN Blood Glucose LESS THAN 70 milliGRAM(s)/deciliter  glucagon  Injectable 1 milliGRAM(s) IntraMuscular once PRN Glucose LESS THAN 70 milligrams/deciliter  oxyCODONE    IR 5 milliGRAM(s) Oral every 6 hours PRN Moderate Pain (4 - 6)  zinc oxide 20% Ointment 1 Application(s) Topical every 6 hours PRN Perineal discomfort from fistula      GEN: resting comfortably   LUNGS: no respiratory distress  ABD: soft, nontender, +bowel sounds   Pelvic: joseph draining clear urine, no fecal matter on peripad; vulva and groin are erythematous but much improved from previously  EXT: no calf tenderness, erythema or edema    LABS:                        7.5    4.68  )-----------( 351      ( 24 Feb 2019 07:42 )             25.2     02-24    140  |  100  |  8   ----------------------------<  132<H>  3.7   |  28  |  0.56    Ca    9.0      24 Feb 2019 07:42  Phos  3.7     02-24  Mg     2.3     02-24    TPro  5.1<L>  /  Alb  2.3<L>  /  TBili  0.4  /  DBili  x   /  AST  9<L>  /  ALT  6<L>  /  AlkPhos  57  02-23    PT/INR - ( 23 Feb 2019 10:35 )   PT: 12.8 sec;   INR: 1.13          PTT - ( 23 Feb 2019 10:35 )  PTT:26.8 sec  Urinalysis Basic - ( 23 Feb 2019 02:45 )    Color: Yellow / Appearance: Clear / SG: <=1.005 / pH: x  Gluc: x / Ketone: NEGATIVE  / Bili: Negative / Urobili: 0.2 E.U./dL   Blood: x / Protein: NEGATIVE mg/dL / Nitrite: NEGATIVE   Leuk Esterase: Moderate / RBC: < 5 /HPF / WBC > 10 /HPF   Sq Epi: x / Non Sq Epi: 0-5 /HPF / Bacteria: Present /HPF

## 2019-02-24 NOTE — PROGRESS NOTE ADULT - ASSESSMENT
Assessment and Plan:   60yo G0 with multiple medical comorbidities admitted for management of symptomatic rectovaginal and enterovaginal fistulas in the setting of Stage 4a endometrioid adenocarcinoma.     1. ID: Patient remains afebrile. Urine culture pending.   2. FEN/GI - NPO for now. LR at 100 cc / hr.  Replete electrolytes PRN. Status post IV Octreotide x 1 dose yesterday. Patient with poor nutritional status- will consult Nutrition for possible PPN.   3. PAIN - No pain at this time. Oral pain medications as needed.   4.  - Westbrook in place draining copious clear urine. Leakage from fistula greatly decreased, no leakage during day shift today.Continue Periguard to vulva and perineum for comfort. General surgery consulted - appreciate recommendations.   5. Endocrine- ISS while patient remains NPO. Holding home metformin at this time.   6. RESP-  No acute issues.   7. LABS -  Daily labs.   8. Gyn Onc: Plan for palliative care consult on Monday with goals of care discussion with Gyn Onc attending Dr. Spivey.     9.  VTE prophylaxis - SCDs, ambulate as tolerated. Lovenox 40qd, PT consulted

## 2019-02-25 DIAGNOSIS — Z51.5 ENCOUNTER FOR PALLIATIVE CARE: ICD-10-CM

## 2019-02-25 DIAGNOSIS — N89.8 OTHER SPECIFIED NONINFLAMMATORY DISORDERS OF VAGINA: ICD-10-CM

## 2019-02-25 DIAGNOSIS — B37.0 CANDIDAL STOMATITIS: ICD-10-CM

## 2019-02-25 DIAGNOSIS — R10.2 PELVIC AND PERINEAL PAIN: ICD-10-CM

## 2019-02-25 DIAGNOSIS — Z71.89 OTHER SPECIFIED COUNSELING: ICD-10-CM

## 2019-02-25 DIAGNOSIS — C54.1 MALIGNANT NEOPLASM OF ENDOMETRIUM: ICD-10-CM

## 2019-02-25 DIAGNOSIS — N82.4 OTHER FEMALE INTESTINAL-GENITAL TRACT FISTULAE: ICD-10-CM

## 2019-02-25 LAB
ALBUMIN SERPL ELPH-MCNC: 2.7 G/DL — LOW (ref 3.3–5)
ALP SERPL-CCNC: 87 U/L — SIGNIFICANT CHANGE UP (ref 40–120)
ALT FLD-CCNC: 17 U/L — SIGNIFICANT CHANGE UP (ref 10–45)
ANION GAP SERPL CALC-SCNC: 14 MMOL/L — SIGNIFICANT CHANGE UP (ref 5–17)
AST SERPL-CCNC: 27 U/L — SIGNIFICANT CHANGE UP (ref 10–40)
BASOPHILS # BLD AUTO: 0.01 K/UL — SIGNIFICANT CHANGE UP (ref 0–0.2)
BASOPHILS NFR BLD AUTO: 0.3 % — SIGNIFICANT CHANGE UP (ref 0–2)
BILIRUB SERPL-MCNC: 0.2 MG/DL — SIGNIFICANT CHANGE UP (ref 0.2–1.2)
BUN SERPL-MCNC: 8 MG/DL — SIGNIFICANT CHANGE UP (ref 7–23)
CALCIUM SERPL-MCNC: 9 MG/DL — SIGNIFICANT CHANGE UP (ref 8.4–10.5)
CHLORIDE SERPL-SCNC: 100 MMOL/L — SIGNIFICANT CHANGE UP (ref 96–108)
CO2 SERPL-SCNC: 25 MMOL/L — SIGNIFICANT CHANGE UP (ref 22–31)
CREAT SERPL-MCNC: 0.55 MG/DL — SIGNIFICANT CHANGE UP (ref 0.5–1.3)
EOSINOPHIL # BLD AUTO: 0.05 K/UL — SIGNIFICANT CHANGE UP (ref 0–0.5)
EOSINOPHIL NFR BLD AUTO: 1.3 % — SIGNIFICANT CHANGE UP (ref 0–6)
GLUCOSE BLDC GLUCOMTR-MCNC: 111 MG/DL — HIGH (ref 70–99)
GLUCOSE BLDC GLUCOMTR-MCNC: 121 MG/DL — HIGH (ref 70–99)
GLUCOSE BLDC GLUCOMTR-MCNC: 127 MG/DL — HIGH (ref 70–99)
GLUCOSE BLDC GLUCOMTR-MCNC: 164 MG/DL — HIGH (ref 70–99)
GLUCOSE SERPL-MCNC: 118 MG/DL — HIGH (ref 70–99)
HCT VFR BLD CALC: 25.7 % — LOW (ref 34.5–45)
HGB BLD-MCNC: 7.9 G/DL — LOW (ref 11.5–15.5)
IMM GRANULOCYTES NFR BLD AUTO: 0.8 % — SIGNIFICANT CHANGE UP (ref 0–1.5)
LYMPHOCYTES # BLD AUTO: 1.48 K/UL — SIGNIFICANT CHANGE UP (ref 1–3.3)
LYMPHOCYTES # BLD AUTO: 38.7 % — SIGNIFICANT CHANGE UP (ref 13–44)
MAGNESIUM SERPL-MCNC: 1.8 MG/DL — SIGNIFICANT CHANGE UP (ref 1.6–2.6)
MCHC RBC-ENTMCNC: 29.4 PG — SIGNIFICANT CHANGE UP (ref 27–34)
MCHC RBC-ENTMCNC: 30.7 GM/DL — LOW (ref 32–36)
MCV RBC AUTO: 95.5 FL — SIGNIFICANT CHANGE UP (ref 80–100)
MONOCYTES # BLD AUTO: 0.37 K/UL — SIGNIFICANT CHANGE UP (ref 0–0.9)
MONOCYTES NFR BLD AUTO: 9.7 % — SIGNIFICANT CHANGE UP (ref 2–14)
NEUTROPHILS # BLD AUTO: 1.88 K/UL — SIGNIFICANT CHANGE UP (ref 1.8–7.4)
NEUTROPHILS NFR BLD AUTO: 49.2 % — SIGNIFICANT CHANGE UP (ref 43–77)
NRBC # BLD: 0 /100 WBCS — SIGNIFICANT CHANGE UP (ref 0–0)
PHOSPHATE SERPL-MCNC: 4.3 MG/DL — SIGNIFICANT CHANGE UP (ref 2.5–4.5)
PLATELET # BLD AUTO: 347 K/UL — SIGNIFICANT CHANGE UP (ref 150–400)
POTASSIUM SERPL-MCNC: 3.8 MMOL/L — SIGNIFICANT CHANGE UP (ref 3.5–5.3)
POTASSIUM SERPL-SCNC: 3.8 MMOL/L — SIGNIFICANT CHANGE UP (ref 3.5–5.3)
PROT SERPL-MCNC: 6 G/DL — SIGNIFICANT CHANGE UP (ref 6–8.3)
RBC # BLD: 2.69 M/UL — LOW (ref 3.8–5.2)
RBC # FLD: 14.2 % — SIGNIFICANT CHANGE UP (ref 10.3–14.5)
SODIUM SERPL-SCNC: 139 MMOL/L — SIGNIFICANT CHANGE UP (ref 135–145)
WBC # BLD: 3.82 K/UL — SIGNIFICANT CHANGE UP (ref 3.8–10.5)
WBC # FLD AUTO: 3.82 K/UL — SIGNIFICANT CHANGE UP (ref 3.8–10.5)

## 2019-02-25 PROCEDURE — 99232 SBSQ HOSP IP/OBS MODERATE 35: CPT

## 2019-02-25 PROCEDURE — 99223 1ST HOSP IP/OBS HIGH 75: CPT

## 2019-02-25 RX ORDER — OCTREOTIDE ACETATE 200 UG/ML
50 INJECTION, SOLUTION INTRAVENOUS; SUBCUTANEOUS EVERY 24 HOURS
Qty: 0 | Refills: 0 | Status: DISCONTINUED | OUTPATIENT
Start: 2019-02-25 | End: 2019-02-27

## 2019-02-25 RX ORDER — IBUPROFEN 200 MG
600 TABLET ORAL EVERY 6 HOURS
Qty: 0 | Refills: 0 | Status: DISCONTINUED | OUTPATIENT
Start: 2019-02-25 | End: 2019-03-11

## 2019-02-25 RX ORDER — NYSTATIN 500MM UNIT
500000 POWDER (EA) MISCELLANEOUS
Qty: 0 | Refills: 0 | Status: DISCONTINUED | OUTPATIENT
Start: 2019-02-25 | End: 2019-03-06

## 2019-02-25 RX ADMIN — OCTREOTIDE ACETATE 50 MICROGRAM(S): 200 INJECTION, SOLUTION INTRAVENOUS; SUBCUTANEOUS at 21:00

## 2019-02-25 RX ADMIN — NYSTATIN CREAM 1 APPLICATION(S): 100000 CREAM TOPICAL at 17:00

## 2019-02-25 RX ADMIN — Medication 650 MILLIGRAM(S): at 17:01

## 2019-02-25 RX ADMIN — Medication 2: at 21:35

## 2019-02-25 RX ADMIN — ENOXAPARIN SODIUM 40 MILLIGRAM(S): 100 INJECTION SUBCUTANEOUS at 13:24

## 2019-02-25 RX ADMIN — Medication 500000 UNIT(S): at 17:00

## 2019-02-25 RX ADMIN — Medication 600 MILLIGRAM(S): at 03:30

## 2019-02-25 RX ADMIN — NYSTATIN CREAM 1 APPLICATION(S): 100000 CREAM TOPICAL at 05:56

## 2019-02-25 RX ADMIN — SODIUM CHLORIDE 100 MILLILITER(S): 9 INJECTION, SOLUTION INTRAVENOUS at 21:01

## 2019-02-25 RX ADMIN — Medication 600 MILLIGRAM(S): at 02:32

## 2019-02-25 RX ADMIN — Medication 650 MILLIGRAM(S): at 17:42

## 2019-02-25 RX ADMIN — Medication 650 MILLIGRAM(S): at 00:00

## 2019-02-25 NOTE — CONSULT NOTE ADULT - PROBLEM SELECTOR RECOMMENDATION 5
Endometrial cancer dx in 7/2018; h/o breast cancer in 2009 s/p chemo and radiation with left breast lumpectomy She has undergone one round of chemotherapy approximately 3 weeks ago; was to complete second cycle of chemotherapy Monday, 2/25.  - Continue plan per primary/Oncology team.s

## 2019-02-25 NOTE — PROGRESS NOTE ADULT - ASSESSMENT
60yo G0 with PMHx significant for breast cancer, diabetes mellitus, endometrial cancer stage 3, s/p SHAMIR, BSO, pelvic lymphadenectomy, low anterior resection w/ end colostomy on 7/30/18 now with rectovaginal fistula, transferred to [facility] from [facility] for care with Dr. Spivey.    Recs:  - Cont same management by primary team  - Discussed with   - No acute surgical intervention  - Surgery 4C will follow

## 2019-02-25 NOTE — CONSULT NOTE ADULT - PROBLEM SELECTOR RECOMMENDATION 2
Likely 2/2 yeast infection from immunocompromised state.   - Continue Nystatin topical powder applied to vagina.    -may consider which karen for further symptom relief

## 2019-02-25 NOTE — PROGRESS NOTE ADULT - ASSESSMENT
58yo HD4 with stage IV endometrial adenocarcinoma s/p staging surgery '18 and 1 round of chemotherapy 3 wks prior admitted for management of symptomatic rectovaginal and enterovaginal fistulas, stable.     1. PAIN - Motrin, Tylenol, Oxycodone prn   2. Cardio: vitals per routint  3. Pulm: no issues   4. FEN/GI - NPO, IVH, replete electrolytes PRN. s/p IV Octreotide, follow up nutrition recs  5.  - joseph in place, no leakage of stool, general surgery consulted for recommendations on management of rectovaginal/enterovaginal fistulas, recs appreciated  6. Endocrine- ISS while patient remains NPO, holding home metformin at this time  7. ID: Patient remains afebrile. Urine culture: no growth   Oral thrush- Nystatin oral wash   8. Palliative following- appreciate recs   9.  VTE prophylaxis - SCDs, ambulate as tolerated. Lovenox 40qd, Follow up PT consult   10. Daily labs

## 2019-02-25 NOTE — CONSULT NOTE ADULT - PROBLEM SELECTOR RECOMMENDATION 4
Presented with fecal-urinary incontinence- MRI findings suggestive of enterovaginal fistula.   - Surgery team on case, possible plan for OR- Fistula repair.

## 2019-02-25 NOTE — CONSULT NOTE ADULT - SUBJECTIVE AND OBJECTIVE BOX
JOHN BAKER          MRN-[id number]            (1960)    HPI:  A 60 y/o G0 with PMH significant for Breast Cancer, Diabetes Mellitus, Endometrial Cancer stage 3, s/p exploratory laparotomy, enterolysis, TOM, BSO, pelvic lymphadenectomy, low anterior resection mobilization of splenic flexure, end colostomy on 18 now with rectovaginal fistula. She was admitted to [facility] one week ago for treatment of UTI, and now transferred to [facility] for further care. MRI obtained at [facility] showed enterovaginal and rectovaginal fistula, repeat MRI on 19 at [facility] shows cystitis, limited study – enterovaginal fistula not visualized. Surgery team following patient- planned for OR with enterovaginal fistula repair. Palliative consulted to evaluated patient for symptoms- pain.     Patient was discharged to rehab center in 2018, after being hospitalized for ex lapatoromy, she remained in rehab until 2019 when she was discharged home. Since home, she ambulates with assistive walking device and had being voiding regularly, with normal stool output in ostomy. Lately patient noted increased pain and discomfort around her perineum.  She reports being completely incontinent of urine. Approximately 1 week ago she had leakage of stool from vagina, and was subsequently admitted to [facility] where she reports receiving IV antibiotics.    Cancer History: G0, endometrial cancer dx in 2018; h/o breast cancer in  s/p chemo and radiation with left breast lumpectomy She has undergone one round of chemotherapy approximately 3 weeks ago; to complete second cycle of chemotherapy this Monday, .     Patient seen and evaluated at bedside. Remains alert, oriented x3, able to communicate all health issues with clear understanding. Since admission, patient has reportedly had complaints of vaginal pain- which she describes as sharp/burning in quality, intermittently occurring, worsens with urination, and reports improvement in pain with Tylenol, Motrin. Also has associated itching, which she reports has improved with Nystatin applied topically to vagina.     ROS: At this time, patient denies any further complaints including fever, chills, chest pain, SOB, abdominal pain, nausea, vomiting, vaginal bleeding.      PAST MEDICAL & SURGICAL HISTORY:   T2DM, polio in childhood, h/o breast cancer  Left breast lumpectomy, right knee surgery with screw placement , 2018 exploratory laparotomy, enterolysis, TOM, BSO, pelvic lymphadenectomy, low anterior resection mobilization of splenic flexure, end colostomy     FAMILY HISTORY:     SOCIAL HISTORY: Patient lives at home with her brothers. No children, not . Lives on first floor with her disabled brother. Ambulates with assistive walking device, requires assistance in activities of daily living- received HHA 4hours/day, 2 days/week, and also receives help from sister Esha Flores (HCP). [employer]    ROS:                      Dyspnea (Alfredo 0-10):   0   -                   N/V (Y/N):             No                 Secretions (Y/N) :        No         Agitation(Y/N):  No  Pain (Y/N):     Yes , Vaginal Pain  -Provocation/Palliation: Increased pain with voiding, relieved with Motrin, Tylenol   -Quality/Quantity: Sharp, Burning   -Radiating: Nonradiating   -Severity: 7-8/10 in severity, improves to 2-3/10 at best  -Timing/Frequency: Intermittently occurring   -Impact on ADLs: ++     General:  Denied  HEENT:    Denied  Neck:  Denied  CVS:  Denied  Resp:  Denied  GI:  Denied  : Dysuria, Incontinence, Vaginal pain/itching  Musc:  Weakness   Neuro:  Denied  Psych:  Denied  Skin:  Denied  Lymph:  Denied    Allergies    Allergy Status Unknown    Intolerances    Opiate Naive (Y/N): Y  -iStop reviewed (Y/N): Y (Ref#:  [id number] )  Alprazolam 0.25m, Lorazepam 0.5mg     Medications:      MEDICATIONS  (STANDING):  dextrose 5%. 1000 milliLiter(s) (50 mL/Hr) IV Continuous <Continuous>  dextrose 50% Injectable 12.5 Gram(s) IV Push once  dextrose 50% Injectable 25 Gram(s) IV Push once  dextrose 50% Injectable 25 Gram(s) IV Push once  enoxaparin Injectable 40 milliGRAM(s) SubCutaneous daily  insulin lispro (HumaLOG) corrective regimen sliding scale   SubCutaneous Before meals and at bedtime  lactated ringers. 1000 milliLiter(s) (100 mL/Hr) IV Continuous <Continuous>  nystatin Powder 1 Application(s) Topical two times a day    MEDICATIONS  (PRN):  acetaminophen   Tablet .. 650 milliGRAM(s) Oral every 6 hours PRN Temp greater or equal to 38C (100.4F), Mild Pain (1 - 3)  dextrose 40% Gel 15 Gram(s) Oral once PRN Blood Glucose LESS THAN 70 milliGRAM(s)/deciliter  glucagon  Injectable 1 milliGRAM(s) IntraMuscular once PRN Glucose LESS THAN 70 milligrams/deciliter  ibuprofen  Tablet. 600 milliGRAM(s) Oral every 6 hours PRN mild pain (1-3) not relieved with Acetaminophen  oxyCODONE    IR 5 milliGRAM(s) Oral every 6 hours PRN Moderate Pain (4 - 6)  zinc oxide 20% Ointment 1 Application(s) Topical every 6 hours PRN Perineal discomfort from fistula    Labs:  reviewed  CBC:                        7.9    3.82  )-----------( 347      ( 2019 06:25 )             25.7     CMP:        139  |  100  |  8   ----------------------------<  118<H>  3.8   |  25  |  0.55    Ca    9.0      2019 06:26  Phos  4.3       Mg     1.8         TPro  6.0  /  Alb  2.7<L>  /  TBili  0.2  /  DBili  x   /  AST  27  /  ALT  17  /  AlkPhos  87      Imaging:  Reviewed  EXAM:  MR PELVIS IC OC                        PROCEDURE DATE:  2019    INTERPRETATION:  ATTENDING RADIOLOGIST ADDENDUM: AGREE WITH THE BELOW   REPORT.  [facility]  Preliminary Radiology Report  Call: [phone]  assistance Online chat: [email or web address]  Patient Name: JOHN BAKER MRN: [id number]   (Age): 1960 59 Gender: F  Date of Exam: 2019 Accession: [id number]  Referring Physician: SHAKEEL PAIGE # of Images: [phone]  Ordered As: MR PELVIS W  EXAM:  MR Pelvis With Contrast  EXAM DATE/TIME:  2019 2:15 PM  CLINICAL HISTORY:  59 years old, female; Condition or disease; Other: HX endometrial   carcinoma. Rectovaginal and  enterovaginal fistula; Prior surgery; Surgery date: 6+ months; Surgery   type: Tom-bso and colostomy  TECHNIQUE: Magnetic resonance images of the pelvis with intravenous contrast.  COMPARISON: QN CT ABDOMEN PELVIS WITH CONTRAST 2019 9:14:55 PM  FINDINGS:  Tubes, catheters and devices: Westbrook catheter in the bladder.   Mild-to-moderate diffuse bladder wall  thickening and enhancement with surrounding edematous change suggestive   of cystitis.   Intraperitoneal space: No free fluid.  Reproductive: Previous hysterectomy and bilateral oophorectomy. Air in   the vagina. Enterovaginal fistula seen on previous CT and not as   well-demonstrated on this exam.  Previous distal colectomy, and colostomy at the left lower quadrant.  IMPRESSION:  1. Previous hysterectomy and bilateral oophorectomy. Enterovaginal   fistula seen on previous CT and  not as well-demonstrated on this exam, likely due to exam limitation. 2   fistula between small bowel and vagina was clearly demonstrated on recent   CT with enteric contrast, best on CT coronal image 37.  2. Westbrook catheter in the bladder. Mild-to-moderate diffuse bladder wall   thickening and enhancement  with surrounding edematous change suggestive of a nonspecific cystitis.   Correlate clinically.    MERLIN TODD M.D., RADIOLOGY RESIDENT  This document has been electronically signed.  STAN WILSON M.D., ATTENDING RADIOLOGIST  This document has been electronically signed. 2019 11:24AM      PEx:  T(C): 35.9 (19 @ 08:44), Max: 36.7 (19 @ 17:02)  HR: 74 (19 @ 08:44) (74 - 82)  BP: 154/96 (19 @ 08:44) (131/86 - 154/96)  RR: 18 (19 @ 08:44) (16 - 18)  SpO2: 94% (19 @ 08:44) (94% - 97%)  Wt(kg): --61.7  Daily     Daily   CAPILLARY BLOOD GLUCOSE    POCT Blood Glucose.: 111 mg/dL (2019 07:42)    I&O's Summary    2019 07:01  -  2019 07:00  --------------------------------------------------------  IN: 2300 mL / OUT: 1725 mL / NET: 575 mL    General: Alert, oriented x3, no acute distresse evident, communicative   HEENT:  Atraumatic, normocephalic , + white film localized to  tongue  Neck: Neck supple   CVS: S1, S2 + regular rate and rhythm   Resp: Bilateral air entry, no wheezing, no rhonchi   GI: Abdomen soft, nontender, nondistended, bowel sounds +, Colostomy + stoma pink, + watery, brown stools  :  Westbrook in situ  Musc: Able to move all extremities  Neuro: No focal deficits   Psych:  Calm, Cooperative   Skin: Intact, Normal   Lymph: No LAD  Preadmit Karnofsky:  60%           Current Karnofsky:     60%  Cachexia (Y/N): No  BMI: 25.    Advanced Directives:     Full Code      decision maker: At this time, patient is currently capacity make all health care related decisions for herself.  Legal surrogate: Patient has officially designated her sister, Esha Hyatt as her primary Health Care Proxy, and Ashlyn Baker as her secondary HCP. Documentation filled and placed in chart. Contact #[phone], #[phone]          REFERRALS	        Palliative Med        Unit SW/Case Mgmt       Music Therapy        Nutrition       PT/OT

## 2019-02-25 NOTE — PROGRESS NOTE ADULT - ASSESSMENT
60yo G0 w/ known stage IV endometrial adenocarcinoma s/p staging surgery '18 and 1 round of chemotherapy 3 wks prior admitted for management of symptomatic rectovaginal and enterovaginal fistulas, stable    1. ID: Patient remains afebrile. Urine culture pending.   2. FEN/GI - NPO, IVH, replete electrolytes PRN. s/p IV Octreotide, nutrition consulted  3. PAIN - OPM PRN  4.  - joseph in place, no leakage of stool overnight, general surgery consulted for recommendations on management of rectovaginal/enterovaginal fistulas, reccs appreciated  5. Endocrine- ISS while patient remains NPO, holding home metformin at this time  6. RESP-  satting well on room air  7. LABS -  Daily labs.   8. Gyn Onc: palliative consult today  9.  VTE prophylaxis - SCDs, ambulate as tolerated. Lovenox 40qd, PT consulted

## 2019-02-25 NOTE — PHYSICAL THERAPY INITIAL EVALUATION ADULT - ADDITIONAL COMMENTS
Patient reports that she was at rehab for 6 months in 2018 (appx Aug - Dec). Patient reports that for the first month, she was not ambulating due to an abdominal surgical wound. Patient and family at bedside state that just prior to this admission, the patient was ambulatory at home with 1 person assist and wheelchair follow due to concerns of patient's weakness. Required 1 person assist for all mobility and ADLs. Bathroom is "handicap accessible," has a shoulder chair as well. Has a rolling walker, wheelchair at home. Assist at home was provided from sister, sister-in-law, and niece who come in "when we are able, but everyone works." Family performed cleaning of the apartment and grocery shopping. No steps to enter the apartment.

## 2019-02-25 NOTE — PHYSICAL THERAPY INITIAL EVALUATION ADULT - PERTINENT HX OF CURRENT PROBLEM, REHAB EVAL
60yo HD4 with stage IV endometrial adenocarcinoma s/p staging surgery '18 and 1 round of chemotherapy 3 wks prior admitted for management of symptomatic rectovaginal and enterovaginal fistulas,

## 2019-02-25 NOTE — DIETITIAN INITIAL EVALUATION ADULT. - NS AS NUTRI INTERV PARENTERAL
Would hold off on parenteral nutrition support until Sx assesses. If NPO status is forseen to be prolonged, PPN is indicated d/t current medical state. Recommend PPN route per MD: 2L total volume w/ 200g Dex, 60g AA, 50g 20% lipids (4x/week). Lytes per MD. Provides 1205kcal, 1.25g pro/kg IBW, GIR 2.25). Osmolality < 1100mOsm/L. Would hold off on parenteral nutrition support until Sx assesses. If NPO status is forseen to be prolonged, PPN is indicated d/t current medical state. Recommend PPN route per MD: 2L total volume w/ 200g Dex, 60g AA, 50g 20% lipids (4x/week). Lytes per MD. Provides 1205kcal, 1.25g pro/kg IBW, GIR 2.25). Osmolality < 1100mOsm/L.  Recommend checking TG before starting PN and then check TG weekly. Check Mg, Phos, K daily and POC BG Q6hrs. Trend daily weights.

## 2019-02-25 NOTE — CONSULT NOTE ADULT - PROBLEM SELECTOR RECOMMENDATION 6
Palliative consulted to assist with symptom management in patient with vaginal pain/itching. See above for our recommendations. New HCP documentation filled and placed in chart. Patient expressed need for increased HHA services at home- to discuss with . Music therapy offered to which the patient was receptive. Palliative to continue to follow for symptom management and supportive services.

## 2019-02-25 NOTE — PROGRESS NOTE ADULT - SUBJECTIVE AND OBJECTIVE BOX
Pt evaluated at bedside. She reports vaginal pain and irritation, took motrin overnight for relief.   She denies fever/chills, HA, dizzines, SOB, CP, palpitations, n/v. She has not ambulated out of bed.    T(C): 36.1 (02-25-19 @ 05:31), Max: 36.7 (02-24-19 @ 20:28)  HR: 76 (02-25-19 @ 05:31) (76 - 82)  BP: 142/84 (02-25-19 @ 05:31) (142/84 - 142/87)  RR: 16 (02-25-19 @ 05:31) (16 - 16)  SpO2: 95% (02-25-19 @ 05:31) (95% - 95%)  GA: NAD, A+OX3  CV: RRR, no MRG  Pulm: CTAB  Abd: +BS, soft, NTND, no rebound or guarding, LLQ ostomy w/ green stool  : nystatin powder on perineum, no erythema, no stool noted per vagina  Extrem: no calf tenderness    02-24 @ 07:01  -  02-25 @ 07:00  --------------------------------------------------------  IN: 2300 mL / OUT: 1725 mL / NET: 575 mL                        7.9    3.82  )-----------( 347      ( 25 Feb 2019 06:25 )             25.7     02-25    139  |  100  |  8   ----------------------------<  118<H>  3.8   |  25  |  0.55    Ca    9.0      25 Feb 2019 06:26  Phos  4.3     02-25  Mg     1.8     02-25    TPro  6.0  /  Alb  2.7<L>  /  TBili  0.2  /  DBili  x   /  AST  27  /  ALT  17  /  AlkPhos  87  02-25    MEDICATIONS  (STANDING):  dextrose 5%. 1000 milliLiter(s) (50 mL/Hr) IV Continuous <Continuous>  dextrose 50% Injectable 12.5 Gram(s) IV Push once  dextrose 50% Injectable 25 Gram(s) IV Push once  dextrose 50% Injectable 25 Gram(s) IV Push once  enoxaparin Injectable 40 milliGRAM(s) SubCutaneous daily  insulin lispro (HumaLOG) corrective regimen sliding scale   SubCutaneous Before meals and at bedtime  lactated ringers. 1000 milliLiter(s) (100 mL/Hr) IV Continuous <Continuous>  nystatin Powder 1 Application(s) Topical two times a day    MEDICATIONS  (PRN):  acetaminophen   Tablet .. 650 milliGRAM(s) Oral every 6 hours PRN Temp greater or equal to 38C (100.4F), Mild Pain (1 - 3)  dextrose 40% Gel 15 Gram(s) Oral once PRN Blood Glucose LESS THAN 70 milliGRAM(s)/deciliter  glucagon  Injectable 1 milliGRAM(s) IntraMuscular once PRN Glucose LESS THAN 70 milligrams/deciliter  ibuprofen  Tablet. 600 milliGRAM(s) Oral every 6 hours PRN mild pain (1-3) not relieved with Acetaminophen  oxyCODONE    IR 5 milliGRAM(s) Oral every 6 hours PRN Moderate Pain (4 - 6)  zinc oxide 20% Ointment 1 Application(s) Topical every 6 hours PRN Perineal discomfort from fistula

## 2019-02-25 NOTE — PROGRESS NOTE ADULT - SUBJECTIVE AND OBJECTIVE BOX
Pt seen and examined at bedside. Pt states mild vaginal pain and itching made better with nystatin cream and pain medication. Pt denies stool output from vagina today. She states she is hungry.   Pt denies fever, chills, chest pain, SOB, nausea, vomiting, lightheadedness, or dizziness.      T(F): 96.7 (02-25-19 @ 08:44), Max: 98.1 (02-24-19 @ 17:02)  HR: 74 (02-25-19 @ 08:44) (74 - 82)  BP: 154/96 (02-25-19 @ 08:44) (131/86 - 154/96)  RR: 18 (02-25-19 @ 08:44) (16 - 18)  SpO2: 94% (02-25-19 @ 08:44) (94% - 97%)  Wt(kg): --  I&O's Summary    24 Feb 2019 07:01  -  25 Feb 2019 07:00  --------------------------------------------------------  IN: 2300 mL / OUT: 1725 mL / NET: 575 mL    MEDICATIONS  (STANDING):  dextrose 5%. 1000 milliLiter(s) (50 mL/Hr) IV Continuous <Continuous>  dextrose 50% Injectable 12.5 Gram(s) IV Push once  dextrose 50% Injectable 25 Gram(s) IV Push once  dextrose 50% Injectable 25 Gram(s) IV Push once  enoxaparin Injectable 40 milliGRAM(s) SubCutaneous daily  insulin lispro (HumaLOG) corrective regimen sliding scale   SubCutaneous Before meals and at bedtime  lactated ringers. 1000 milliLiter(s) (100 mL/Hr) IV Continuous <Continuous>  nystatin Powder 1 Application(s) Topical two times a day    MEDICATIONS  (PRN):  acetaminophen   Tablet .. 650 milliGRAM(s) Oral every 6 hours PRN Temp greater or equal to 38C (100.4F), Mild Pain (1 - 3)  dextrose 40% Gel 15 Gram(s) Oral once PRN Blood Glucose LESS THAN 70 milliGRAM(s)/deciliter  glucagon  Injectable 1 milliGRAM(s) IntraMuscular once PRN Glucose LESS THAN 70 milligrams/deciliter  ibuprofen  Tablet. 600 milliGRAM(s) Oral every 6 hours PRN mild pain (1-3) not relieved with Acetaminophen  oxyCODONE    IR 5 milliGRAM(s) Oral every 6 hours PRN Moderate Pain (4 - 6)  zinc oxide 20% Ointment 1 Application(s) Topical every 6 hours PRN Perineal discomfort from fistula    Physical Exam:  Constitutional: NAD  Pulmonary: clear to auscultation bilaterally   Cardiovascular: Regular rate and rhythm   Abdomen: Soft, nontender, nondistended, no guarding, no rebound, +bowel sounds, ostomy intact with brown stool output   Extremities: no lower extremity edema or calve tenderness. SCDs in place     LABS:                        7.9    3.82  )-----------( 347      ( 25 Feb 2019 06:25 )             25.7     02-25    139  |  100  |  8   ----------------------------<  118<H>  3.8   |  25  |  0.55    Ca    9.0      25 Feb 2019 06:26  Phos  4.3     02-25  Mg     1.8     02-25    TPro  6.0  /  Alb  2.7<L>  /  TBili  0.2  /  DBili  x   /  AST  27  /  ALT  17  /  AlkPhos  87  02-25

## 2019-02-25 NOTE — CONSULT NOTE ADULT - ASSESSMENT
A 60 y/o G0 with PMH significant for Breast Cancer, Diabetes Mellitus, Endometrial Cancer stage 3, s/p exploratory laparotomy, enterolysis, SHAMIR, BSO, pelvic lymphadenectomy, low anterior resection mobilization of splenic flexure, end colostomy on 7/30/18 now with rectovaginal fistula, pending surgical repair. Palliative consulted to assist with symptom management- vaginal pain/itching.

## 2019-02-25 NOTE — PROGRESS NOTE ADULT - SUBJECTIVE AND OBJECTIVE BOX
Subjective:  Doing well, denies any abdominal pain, no nausea or vomiting, passing stool and gas  Vital Signs Last 24 Hrs  T(C): 36.2 (25 Feb 2019 17:43), Max: 36.8 (25 Feb 2019 14:27)  T(F): 97.2 (25 Feb 2019 17:43), Max: 98.2 (25 Feb 2019 14:27)  HR: 77 (25 Feb 2019 17:43) (72 - 82)  BP: 142/88 (25 Feb 2019 17:43) (134/87 - 154/96)  BP(mean): --  RR: 16 (25 Feb 2019 17:43) (16 - 18)  SpO2: 94% (25 Feb 2019 17:43) (94% - 97%)    Physical Exam:  Constitutional: A&Ox3  Respiratory: non labored breathing, no respiratory distress  Cardiovascular: NSR, RRR  Gastrointestinal: soft, nontender, nondistended, ostomy in place with fecal contents      LABS:                        7.9    3.82  )-----------( 347      ( 25 Feb 2019 06:25 )             25.7     02-25    139  |  100  |  8   ----------------------------<  118<H>  3.8   |  25  |  0.55    Ca    9.0      25 Feb 2019 06:26  Phos  4.3     02-25  Mg     1.8     02-25    TPro  6.0  /  Alb  2.7<L>  /  TBili  0.2  /  DBili  x   /  AST  27  /  ALT  17  /  AlkPhos  87  02-25      CAPILLARY BLOOD GLUCOSE      POCT Blood Glucose.: 127 mg/dL (25 Feb 2019 16:34)  POCT Blood Glucose.: 121 mg/dL (25 Feb 2019 11:47)  POCT Blood Glucose.: 111 mg/dL (25 Feb 2019 07:42)  POCT Blood Glucose.: 101 mg/dL (24 Feb 2019 22:33)      LIVER FUNCTIONS - ( 25 Feb 2019 06:26 )  Alb: 2.7 g/dL / Pro: 6.0 g/dL / ALK PHOS: 87 U/L / ALT: 17 U/L / AST: 27 U/L / GGT: x

## 2019-02-25 NOTE — DIETITIAN INITIAL EVALUATION ADULT. - ENERGY NEEDS
Height: 5'1" Weight: 136lbs, IBW 105lbs+/-10%, %%, BMI 25.7  IBW used for calculations as pt >120% of IBW   Nutrient needs based on [facility] standards of care for maintenance in adults.   Needs adjusted 2/2 catabolic illness, chemo treatments. Fluids adjusted 2/2 colostomy.

## 2019-02-25 NOTE — CONSULT NOTE ADULT - PROBLEM SELECTOR RECOMMENDATION 9
Likely 2/2 UTI/Cystitis c/b Enterovaginal Fistula.   - Continue Motrin 600mg q6h PRN mild pain.   - Continue Tylenol 650mg q6h PRN mild pain.   - Continue Oxycodone IR 5mg q6h PRN moderate/severe pain.   - Educated patient to request pain medication as all medications available as needed.   - To adjust opioid dose based on increased requirements over the next 24 hours.  - Ensure bowel regimen as needed. Consider Senna if reduced output in Colostomy bag- currently brown/watery stools.

## 2019-02-25 NOTE — CONSULT NOTE ADULT - PROBLEM SELECTOR RECOMMENDATION 7
Patient currently  hasapacitaty to make all health care decisions for herself. Patient had a previous HCP form designating her sister Esha Hyatt ([phone], [phone]) as her official primary Health Care Proxy. Today, patient would like to also appoint her sister-in-law Ashlyn Baker ([phone], [phone]) as her secondary Health Care Proxy. Will complete new HCP documentation and place in chart.    Sister states concerns re: needing mor HA following hospitalization. Pt and family do not wish to pursue any futher LITO. SW aware and will address

## 2019-02-25 NOTE — PHYSICAL THERAPY INITIAL EVALUATION ADULT - GENERAL OBSERVATIONS, REHAB EVAL
Received supine in bed with + IV, room air, family at bedside, + bed alarm. Patient in no apparent distress.

## 2019-02-25 NOTE — PHYSICAL THERAPY INITIAL EVALUATION ADULT - DISCHARGE DISPOSITION, PT EVAL
Recommend Subacute Rehab, however patient adamantly refusing this plan. Patient requests to go home with family support and aide services. Per CM, patient has 3 hours x 3 days at home, however patient will require assist at all times considering current level of functional mobility. Patient notified of concern for her to return home at this time.

## 2019-02-25 NOTE — DIETITIAN INITIAL EVALUATION ADULT. - OTHER INFO
58yo F with stage IV endometrial adenocarcinoma s/p staging surgery '18 and 1 round of chemotherapy 3 wks ago. Was planned to complete 2nd cycle of chemo today 2/25. Admitted for management of symptomatic rectovaginal and enterovaginal fistulas. Pt seen resting in bed w/ family at bedside. Currently NPO- diet advancement pending nutrition and surgery recs. Likely planned for OR for fistula repair. Denies N/V. Moderate stool output via colostomy today, none through vagina. Pt understanding of current NPO status with advancement pending medical course. Denies any recent weight changes. Reports eating well PTA. NKFA or dietary restrictions. Since patient appears well nourished upon NFPE, endorsed good appetite/intake PTA and denied recent wt changes, would hold off on parenteral nutrition support until Sx assesses. Consider advancing to Clear Liquid diet w/ EnsureClears TID (720kcal, 24g pro). If NPO status/Sx prolonged see PPN recs below. RD to follow. 60yo F with stage IV endometrial adenocarcinoma s/p staging surgery '18 and 1 round of chemotherapy 3 wks ago. Was planned to complete 2nd cycle of chemo today 2/25. Admitted for management of symptomatic rectovaginal and enterovaginal fistulas. Pt seen resting in bed w/ family at bedside. Currently NPO- diet advancement pending nutrition and surgery recs. Likely planned for OR for fistula repair. Denies N/V. Moderate stool output via colostomy today, none through vagina. Pt understanding of current NPO status with advancement pending medical course. Denies any recent weight changes. Reports eating well PTA. NKFA or dietary restrictions. Since patient appears well nourished upon NFPE, endorsed good appetite/intake PTA and denied recent wt changes, would hold off on parenteral nutrition support until Sx assesses. Consider advancing to Clear Liquid diet w/ EnsureClears TID (720kcal, 24g pro). If NPO status is forseen to be prolonged see PPN recs below. RD to follow. 60yo F with stage IV endometrial adenocarcinoma s/p staging surgery '18 and 1 round of chemotherapy 3 wks ago. Was planned to complete 2nd cycle of chemo today 2/25. Admitted for management of symptomatic rectovaginal and enterovaginal fistulas. Pt seen resting in bed w/ family at bedside. Currently NPO- diet advancement pending nutrition and surgery recs. Likely planned for OR for fistula repair. Denies N/V. Moderate stool output via colostomy today, none through vagina. Pt understanding of current NPO status with advancement pending medical course. Denies any recent weight changes. Reports eating well PTA. NKFA or dietary restrictions. Since patient appears well nourished upon NFPE, endorsed good appetite/intake PTA and denied recent wt changes, would hold off on parenteral nutrition support until Sx assesses. Consider advancing to Clear Liquid diet w/ EnsureClears TID (720kcal, 24g pro). If NPO status is foreseen to be prolonged see PPN recs below. RD to follow. 58yo F with stage IV endometrial adenocarcinoma s/p staging surgery '18 and 1 round of chemotherapy 3 wks ago. Was planned to complete 2nd cycle of chemo today 2/25. Admitted for management of symptomatic rectovaginal and enterovaginal fistulas. Pt seen resting in bed w/ family at bedside. Currently NPO- diet advancement pending nutrition and surgery recs. Likely planned for OR for fistula repair. Denies N/V. Moderate stool output via colostomy today, none through vagina. Pt understanding of current NPO status with advancement pending medical course. Denies any recent weight changes. Reports eating well PTA. NKFA or dietary restrictions. Since patient appears well nourished upon NFPE, endorsed good appetite/intake PTA and denied recent wt changes, would hold off on parenteral nutrition support until Sx assesses. Consider advancing to Clear Liquid diet w/ EnsureClears TID (720kcal, 24g pro). If NPO status/Sx intervention is foreseen to be prolonged, PPN is indicated- see recs below. RD to follow.

## 2019-02-25 NOTE — DIETITIAN INITIAL EVALUATION ADULT. - NS AS NUTRI INTERV MEALS SNACK
Consider advancing to Clear Liquid diet w/ EnsureClears TID (720kcal, 24g pro)/General/healthful diet

## 2019-02-26 LAB
ANION GAP SERPL CALC-SCNC: 12 MMOL/L — SIGNIFICANT CHANGE UP (ref 5–17)
APPEARANCE UR: CLEAR — SIGNIFICANT CHANGE UP
BILIRUB UR-MCNC: NEGATIVE — SIGNIFICANT CHANGE UP
BUN SERPL-MCNC: 6 MG/DL — LOW (ref 7–23)
CALCIUM SERPL-MCNC: 9.1 MG/DL — SIGNIFICANT CHANGE UP (ref 8.4–10.5)
CHLORIDE SERPL-SCNC: 99 MMOL/L — SIGNIFICANT CHANGE UP (ref 96–108)
CO2 SERPL-SCNC: 28 MMOL/L — SIGNIFICANT CHANGE UP (ref 22–31)
COLOR SPEC: YELLOW — SIGNIFICANT CHANGE UP
CREAT SERPL-MCNC: 0.51 MG/DL — SIGNIFICANT CHANGE UP (ref 0.5–1.3)
DIFF PNL FLD: ABNORMAL
GLUCOSE BLDC GLUCOMTR-MCNC: 109 MG/DL — HIGH (ref 70–99)
GLUCOSE BLDC GLUCOMTR-MCNC: 152 MG/DL — HIGH (ref 70–99)
GLUCOSE BLDC GLUCOMTR-MCNC: 165 MG/DL — HIGH (ref 70–99)
GLUCOSE BLDC GLUCOMTR-MCNC: 99 MG/DL — SIGNIFICANT CHANGE UP (ref 70–99)
GLUCOSE SERPL-MCNC: 155 MG/DL — HIGH (ref 70–99)
GLUCOSE UR QL: NEGATIVE — SIGNIFICANT CHANGE UP
HCT VFR BLD CALC: 27.1 % — LOW (ref 34.5–45)
HGB BLD-MCNC: 8.3 G/DL — LOW (ref 11.5–15.5)
KETONES UR-MCNC: ABNORMAL MG/DL
LEUKOCYTE ESTERASE UR-ACNC: ABNORMAL
MAGNESIUM SERPL-MCNC: 1.6 MG/DL — SIGNIFICANT CHANGE UP (ref 1.6–2.6)
MCHC RBC-ENTMCNC: 28.7 PG — SIGNIFICANT CHANGE UP (ref 27–34)
MCHC RBC-ENTMCNC: 30.6 GM/DL — LOW (ref 32–36)
MCV RBC AUTO: 93.8 FL — SIGNIFICANT CHANGE UP (ref 80–100)
NITRITE UR-MCNC: POSITIVE
NRBC # BLD: 0 /100 WBCS — SIGNIFICANT CHANGE UP (ref 0–0)
PH UR: 7 — SIGNIFICANT CHANGE UP (ref 5–8)
PHOSPHATE SERPL-MCNC: 3.4 MG/DL — SIGNIFICANT CHANGE UP (ref 2.5–4.5)
PLATELET # BLD AUTO: 458 K/UL — HIGH (ref 150–400)
POTASSIUM SERPL-MCNC: 3 MMOL/L — LOW (ref 3.5–5.3)
POTASSIUM SERPL-SCNC: 3 MMOL/L — LOW (ref 3.5–5.3)
PROT UR-MCNC: 30 MG/DL
RBC # BLD: 2.89 M/UL — LOW (ref 3.8–5.2)
RBC # FLD: 14.3 % — SIGNIFICANT CHANGE UP (ref 10.3–14.5)
SODIUM SERPL-SCNC: 139 MMOL/L — SIGNIFICANT CHANGE UP (ref 135–145)
SP GR SPEC: 1.01 — SIGNIFICANT CHANGE UP (ref 1–1.03)
UROBILINOGEN FLD QL: 0.2 E.U./DL — SIGNIFICANT CHANGE UP
WBC # BLD: 3.61 K/UL — LOW (ref 3.8–10.5)
WBC # FLD AUTO: 3.61 K/UL — LOW (ref 3.8–10.5)

## 2019-02-26 PROCEDURE — 99233 SBSQ HOSP IP/OBS HIGH 50: CPT

## 2019-02-26 PROCEDURE — 99232 SBSQ HOSP IP/OBS MODERATE 35: CPT

## 2019-02-26 RX ORDER — I.V. FAT EMULSION 20 G/100ML
50 EMULSION INTRAVENOUS ONCE
Qty: 0 | Refills: 0 | Status: COMPLETED | OUTPATIENT
Start: 2019-02-26 | End: 2019-02-26

## 2019-02-26 RX ORDER — OXYCODONE HYDROCHLORIDE 5 MG/1
10 TABLET ORAL EVERY 6 HOURS
Qty: 0 | Refills: 0 | Status: DISCONTINUED | OUTPATIENT
Start: 2019-02-26 | End: 2019-02-27

## 2019-02-26 RX ORDER — POTASSIUM CHLORIDE 20 MEQ
10 PACKET (EA) ORAL
Qty: 0 | Refills: 0 | Status: DISCONTINUED | OUTPATIENT
Start: 2019-02-26 | End: 2019-02-26

## 2019-02-26 RX ORDER — MAGNESIUM SULFATE 500 MG/ML
2 VIAL (ML) INJECTION ONCE
Qty: 0 | Refills: 0 | Status: COMPLETED | OUTPATIENT
Start: 2019-02-26 | End: 2019-02-26

## 2019-02-26 RX ORDER — ESCITALOPRAM OXALATE 10 MG/1
10 TABLET, FILM COATED ORAL AT BEDTIME
Qty: 0 | Refills: 0 | Status: DISCONTINUED | OUTPATIENT
Start: 2019-02-26 | End: 2019-03-11

## 2019-02-26 RX ORDER — POTASSIUM PHOSPHATE, MONOBASIC POTASSIUM PHOSPHATE, DIBASIC 236; 224 MG/ML; MG/ML
30 INJECTION, SOLUTION INTRAVENOUS ONCE
Qty: 0 | Refills: 0 | Status: DISCONTINUED | OUTPATIENT
Start: 2019-02-26 | End: 2019-02-26

## 2019-02-26 RX ORDER — ELECTROLYTE SOLUTION,INJ
1 VIAL (ML) INTRAVENOUS
Qty: 0 | Refills: 0 | Status: DISCONTINUED | OUTPATIENT
Start: 2019-02-26 | End: 2019-02-26

## 2019-02-26 RX ORDER — PANTOPRAZOLE SODIUM 20 MG/1
40 TABLET, DELAYED RELEASE ORAL EVERY 24 HOURS
Qty: 0 | Refills: 0 | Status: DISCONTINUED | OUTPATIENT
Start: 2019-02-26 | End: 2019-03-25

## 2019-02-26 RX ADMIN — I.V. FAT EMULSION 20.83 GRAM(S): 20 EMULSION INTRAVENOUS at 22:05

## 2019-02-26 RX ADMIN — Medication 500000 UNIT(S): at 12:28

## 2019-02-26 RX ADMIN — Medication 100 MILLIEQUIVALENT(S): at 13:03

## 2019-02-26 RX ADMIN — Medication 500000 UNIT(S): at 17:36

## 2019-02-26 RX ADMIN — Medication 500000 UNIT(S): at 06:35

## 2019-02-26 RX ADMIN — PANTOPRAZOLE SODIUM 40 MILLIGRAM(S): 20 TABLET, DELAYED RELEASE ORAL at 07:52

## 2019-02-26 RX ADMIN — NYSTATIN CREAM 1 APPLICATION(S): 100000 CREAM TOPICAL at 17:36

## 2019-02-26 RX ADMIN — OXYCODONE HYDROCHLORIDE 10 MILLIGRAM(S): 5 TABLET ORAL at 22:06

## 2019-02-26 RX ADMIN — Medication 1 EACH: at 17:36

## 2019-02-26 RX ADMIN — ESCITALOPRAM OXALATE 10 MILLIGRAM(S): 10 TABLET, FILM COATED ORAL at 21:26

## 2019-02-26 RX ADMIN — NYSTATIN CREAM 1 APPLICATION(S): 100000 CREAM TOPICAL at 06:36

## 2019-02-26 RX ADMIN — OCTREOTIDE ACETATE 50 MICROGRAM(S): 200 INJECTION, SOLUTION INTRAVENOUS; SUBCUTANEOUS at 22:06

## 2019-02-26 RX ADMIN — OXYCODONE HYDROCHLORIDE 10 MILLIGRAM(S): 5 TABLET ORAL at 23:00

## 2019-02-26 RX ADMIN — Medication 50 GRAM(S): at 12:27

## 2019-02-26 RX ADMIN — Medication 2: at 21:25

## 2019-02-26 RX ADMIN — Medication 500000 UNIT(S): at 00:06

## 2019-02-26 RX ADMIN — ENOXAPARIN SODIUM 40 MILLIGRAM(S): 100 INJECTION SUBCUTANEOUS at 12:27

## 2019-02-26 NOTE — PROGRESS NOTE ADULT - SUBJECTIVE AND OBJECTIVE BOX
Subjective:  Afebrile, epigastric discomfort, no nausea or vomiting      Vital Signs Last 24 Hrs  T(C): 37 (26 Feb 2019 05:50), Max: 37 (26 Feb 2019 05:50)  T(F): 98.6 (26 Feb 2019 05:50), Max: 98.6 (26 Feb 2019 05:50)  HR: 78 (26 Feb 2019 05:50) (72 - 78)  BP: 147/84 (26 Feb 2019 05:50) (130/82 - 147/84)  BP(mean): --  RR: 18 (26 Feb 2019 05:50) (16 - 18)  SpO2: 94% (26 Feb 2019 05:50) (94% - 97%)    Physical Exam:  General: NAD, resting comfortably in bed  Pulmonary: Nonlabored breathing, no respiratory distress  Abdominal: soft, NT/ND, Ostomy on LLQ with stool content       LABS:                        8.3    3.61  )-----------( 458      ( 26 Feb 2019 07:22 )             27.1     02-26    139  |  99  |  6<L>  ----------------------------<  155<H>  3.0<L>   |  28  |  0.51    Ca    9.1      26 Feb 2019 07:22  Phos  3.4     02-26  Mg     1.6     02-26    TPro  6.0  /  Alb  2.7<L>  /  TBili  0.2  /  DBili  x   /  AST  27  /  ALT  17  /  AlkPhos  87  02-25      CAPILLARY BLOOD GLUCOSE      POCT Blood Glucose.: 152 mg/dL (26 Feb 2019 06:58)  POCT Blood Glucose.: 164 mg/dL (25 Feb 2019 21:17)  POCT Blood Glucose.: 127 mg/dL (25 Feb 2019 16:34)  POCT Blood Glucose.: 121 mg/dL (25 Feb 2019 11:47)      LIVER FUNCTIONS - ( 25 Feb 2019 06:26 )  Alb: 2.7 g/dL / Pro: 6.0 g/dL / ALK PHOS: 87 U/L / ALT: 17 U/L / AST: 27 U/L / GGT: x

## 2019-02-26 NOTE — PROGRESS NOTE ADULT - PROBLEM SELECTOR PLAN 3
Likely 2/2 immunocompromised state.  - continue Nystatin oral solution. Reinforced need to spit out and not to swallow

## 2019-02-26 NOTE — PROGRESS NOTE ADULT - ASSESSMENT
58yo G0 w/ known stage IV endometrial adenocarcinoma s/p staging surgery '18 and 1 round of chemotherapy 3 wks prior admitted for management of symptomatic rectovaginal and enterovaginal fistulas, stable    1. ID: Patient remains afebrile. Urine culture no growth  2. FEN/GI - NPO, IVH, replete electrolytes PRN. IV Octreotide daily, nutrition consulted and recommends to PPN only if anticipating prolonged NPO state  3. PAIN - OPM PRN  4.  - joseph in place, small amount of leakage of stool per vagina last night and this morning, general surgery consulted for recommendations on management of rectovaginal/enterovaginal fistulas, reccs appreciated, per note they are not recommending surgical intervention at this time  5. Endocrine- ISS while patient remains NPO, holding home metformin at this time  6. RESP-  satting well on room air  7. LABS -  Daily labs.   8. Gyn Onc: palliative consulted and following  9.  VTE prophylaxis - SCDs, ambulate as tolerated. Lovenox 40qd, PT consulted   10. PT recommending LITO however patient does not want LITO

## 2019-02-26 NOTE — PROGRESS NOTE ADULT - ASSESSMENT
60yo HD5 with stage IV endometrial adenocarcinoma s/p staging surgery '18 and 1 round of chemotherapy 3 wks prior admitted for management of symptomatic rectovaginal and enterovaginal fistulas, stable.     1. PAIN - Motrin, Tylenol, Oxycodone prn   2. Cardio: vitals per routine   3. Pulm: no issues   4. FEN/GI - PPN starting today, NPO, IVH, replete electrolytes PRN. s/p IV Octreotide, follow up nutrition recs  5.  - joseph in place, no leakage of stool today, general surgery consulted for recommendations on management of rectovaginal/enterovaginal fistulas, recs appreciated  6. Endocrine- ISS while patient remains NPO, holding home metformin at this time  7. ID: Pt remains afebrile. Urine culture: no growth   Oral thrush- Nystatin oral wash- Pt understands now that she needs to wash then spit solution out  8. Palliative following- appreciate recs   9.  VTE prophylaxis - SCDs, ambulate as tolerated. Lovenox 40qd  10. PT recommends LITO for Pt but Pt is refusing   11. Daily labs

## 2019-02-26 NOTE — PROGRESS NOTE ADULT - ASSESSMENT
A 58 y/o G0 with PMH significant for Breast Cancer, Diabetes Mellitus, Endometrial Cancer stage 3, s/p exploratory laparotomy, enterolysis, SHAMIR, BSO, pelvic lymphadenectomy, low anterior resection mobilization of splenic flexure, end colostomy on 7/30/18 now with rectovaginal fistula, pending surgical repair. Palliative consulted to assist with symptom management- vaginal pain/itching.

## 2019-02-26 NOTE — PROGRESS NOTE ADULT - PROBLEM SELECTOR PLAN 6
Support provided to patient and family. Patient to have access to supportive services during rest of hospital stay as the patient/family deemed necessary ie. Chaplaincy, Massage therapy, Music therapy, Patient and family supportive services, Palliative SW, etc.    As discussed during the palliative IDT meeting and as identified during the patients PSSA screening the patient would benefit from: seen by music therapist today for support and encouragement

## 2019-02-26 NOTE — PROGRESS NOTE ADULT - SUBJECTIVE AND OBJECTIVE BOX
Pt seen and examined at bedside. Pt states she has less vaginal itching/irritation than yesterday. She states she is not happy that she needs to get another IV placed for PPN today. She has not ambulated today because she overall feels weak. She denies any stool output from vagina today. Pt felt a bit nauseous today after she accidentally drank nystatin oral wash.   Pt denies fever, chills, chest pain, SOB, nausea, vomiting, lightheadedness, or dizziness.     T(F): 97.2 (02-26-19 @ 13:10), Max: 98.6 (02-26-19 @ 05:50)  HR: 68 (02-26-19 @ 13:10) (68 - 78)  BP: 138/74 (02-26-19 @ 09:00) (130/82 - 147/84)  RR: 18 (02-26-19 @ 13:10) (16 - 18)  SpO2: 95% (02-26-19 @ 13:10) (94% - 97%)  Wt(kg): --  I&O's Summary    25 Feb 2019 07:01  -  26 Feb 2019 07:00  --------------------------------------------------------  IN: 1500 mL / OUT: 3350 mL / NET: -1850 mL    26 Feb 2019 07:01  -  26 Feb 2019 13:52  --------------------------------------------------------  IN: 600 mL / OUT: 0 mL / NET: 600 mL    MEDICATIONS  (STANDING):  dextrose 5%. 1000 milliLiter(s) (50 mL/Hr) IV Continuous <Continuous>  dextrose 50% Injectable 12.5 Gram(s) IV Push once  dextrose 50% Injectable 25 Gram(s) IV Push once  dextrose 50% Injectable 25 Gram(s) IV Push once  enoxaparin Injectable 40 milliGRAM(s) SubCutaneous daily  fat emulsion (Plant Based) 20% IVPB 50 Gram(s) IV Intermittent once  insulin lispro (HumaLOG) corrective regimen sliding scale   SubCutaneous Before meals and at bedtime  lactated ringers. 1000 milliLiter(s) (100 mL/Hr) IV Continuous <Continuous>  nystatin    Suspension 359205 Unit(s) Oral four times a day  nystatin Powder 1 Application(s) Topical two times a day  octreotide  Injectable 50 MICROGram(s) IV Push every 24 hours  pantoprazole  Injectable 40 milliGRAM(s) IV Push every 24 hours  Parenteral Nutrition - Adult 1 Each (83 mL/Hr) TPN Continuous <Continuous>  potassium chloride  10 mEq/100 mL IVPB 10 milliEquivalent(s) IV Intermittent every 1 hour  potassium chloride  10 mEq/100 mL IVPB 10 milliEquivalent(s) IV Intermittent every 1 hour    MEDICATIONS  (PRN):  acetaminophen   Tablet .. 650 milliGRAM(s) Oral every 6 hours PRN Temp greater or equal to 38C (100.4F), Mild Pain (1 - 3)  dextrose 40% Gel 15 Gram(s) Oral once PRN Blood Glucose LESS THAN 70 milliGRAM(s)/deciliter  glucagon  Injectable 1 milliGRAM(s) IntraMuscular once PRN Glucose LESS THAN 70 milligrams/deciliter  ibuprofen  Tablet. 600 milliGRAM(s) Oral every 6 hours PRN mild pain (1-3) not relieved with Acetaminophen  oxyCODONE    IR 5 milliGRAM(s) Oral every 6 hours PRN Moderate Pain (4 - 6)  zinc oxide 20% Ointment 1 Application(s) Topical every 6 hours PRN Perineal discomfort from fistula    Physical Exam:  Constitutional: NAD  Pulmonary: clear to auscultation bilaterally   Cardiovascular: Regular rate and rhythm   Abdomen: Soft, nontender, nondistended, no guarding, no rebound, ostomy intact with brown stool output   Extremities: no lower extremity edema or calve tenderness. SCDs in place     LABS:                        8.3    3.61  )-----------( 458      ( 26 Feb 2019 07:22 )             27.1     02-26    139  |  99  |  6<L>  ----------------------------<  155<H>  3.0<L>   |  28  |  0.51    Ca    9.1      26 Feb 2019 07:22  Phos  3.4     02-26  Mg     1.6     02-26    TPro  6.0  /  Alb  2.7<L>  /  TBili  0.2  /  DBili  x   /  AST  27  /  ALT  17  /  AlkPhos  87  02-25

## 2019-02-26 NOTE — PROGRESS NOTE ADULT - PROBLEM SELECTOR PLAN 1
much decreased  today  -Pt received 1 dose Tylenol over past 24 hrs.  Likely 2/2 UTI/Cystitis c/b Enterovaginal Fistula.   - Continue Motrin 600mg q6h PRN mild pain.   - Continue Tylenol 650mg q6h PRN mild pain.   - Continue Oxycodone IR 5mg q6h PRN moderate/severe pain.   - ongoing education to  patient to request pain medication as all medications available as needed.   - Ensure bowel regimen as needed. Consider Senna if reduced output in Colostomy bag- currently brown/watery stools.

## 2019-02-26 NOTE — PROGRESS NOTE ADULT - SUBJECTIVE AND OBJECTIVE BOX
JOHN BAKER          MRN-[id number]            (1960)    HPI:  A 58 y/o G0 with PMH significant for Breast Cancer, Diabetes Mellitus, Endometrial Cancer stage 3, s/p exploratory laparotomy, enterolysis, TOM, BSO, pelvic lymphadenectomy, low anterior resection mobilization of splenic flexure, end colostomy on 18 now with rectovaginal fistula. She was admitted to [place] one week ago for treatment of UTI, and now transferred to [facility] for further care. MRI obtained at [facility] showed enterovaginal and rectovaginal fistula, repeat MRI on 19 at [facility] shows cystitis, limited study – enterovaginal fistula not visualized. Surgery team following patient- planned for OR with enterovaginal fistula repair. Palliative consulted to evaluated patient for symptoms- pain.     Patient was discharged to rehab center in 2018, after being hospitalized for ex lapatoromy, she remained in rehab until 2019 when she was discharged home. Since home, she ambulates with assistive walking device and had being voiding regularly, with normal stool output in ostomy. Lately patient noted increased pain and discomfort around her perineum.  She reports being completely incontinent of urine. Approximately 1 week ago she had leakage of stool from vagina, and was subsequently admitted to [place] where she reports receiving IV antibiotics.    Cancer History: G0, endometrial cancer dx in 2018; h/o breast cancer in  s/p chemo and radiation with left breast lumpectomy She has undergone one round of chemotherapy approximately 3 weeks ago; to complete second cycle of chemotherapy this Monday, .     Subjective: Patient seen and evaluated at bedside.Labile during visit,  alert, oriented x3, able to communicate all health issues with clear understanding. Verbalizes concerns of "needing new IV access for my feeds" Further verbalizes her deep sadness at her increasing weakness and loss of autonomy    ROS:  decreasing vaginal itching                     Dyspnea (Alfredo 0-10):   0   -                   N/V (Y/N):             No                 Secretions (Y/N) :        No         Agitation(Y/N):  No  Pain (Y/N):     Yes ,mild  Vaginal Pain  -Provocation/Palliation: Increased pain with voiding, relieved with  1 dose of Tylenol over past 24 hrs   -Quality/Quantity: Sharp, Burning   -Radiating: Nonradiating   -Severity: 2/10 presently   -Timing/Frequency: Intermittently occurring   -Impact on ADLs: loss of autonomy     Allergies    Allergy Status Unknown    Intolerances    Opiate Naive (Y/N): Y  -iStop reviewed (Y/N): Y on initial consultation (Ref#:  [id number] )  Alprazolam 0.25m, Lorazepam 0.5mg     Medications:      MEDICATIONS  (STANDING):  dextrose 5%. 1000 milliLiter(s) (50 mL/Hr) IV Continuous <Continuous>  dextrose 50% Injectable 12.5 Gram(s) IV Push once  dextrose 50% Injectable 25 Gram(s) IV Push once  dextrose 50% Injectable 25 Gram(s) IV Push once  enoxaparin Injectable 40 milliGRAM(s) SubCutaneous daily  fat emulsion (Plant Based) 20% IVPB 50 Gram(s) IV Intermittent once  insulin lispro (HumaLOG) corrective regimen sliding scale   SubCutaneous Before meals and at bedtime  lactated ringers. 1000 milliLiter(s) (100 mL/Hr) IV Continuous <Continuous>  nystatin    Suspension 082252 Unit(s) Oral four times a day  nystatin Powder 1 Application(s) Topical two times a day  octreotide  Injectable 50 MICROGram(s) IV Push every 24 hours  pantoprazole  Injectable 40 milliGRAM(s) IV Push every 24 hours  Parenteral Nutrition - Adult 1 Each (83 mL/Hr) TPN Continuous <Continuous>  potassium chloride  10 mEq/100 mL IVPB 10 milliEquivalent(s) IV Intermittent every 1 hour  potassium chloride  10 mEq/100 mL IVPB 10 milliEquivalent(s) IV Intermittent every 1 hour    MEDICATIONS  (PRN):  acetaminophen   Tablet .. 650 milliGRAM(s) Oral every 6 hours PRN Temp greater or equal to 38C (100.4F), Mild Pain (1 - 3)  dextrose 40% Gel 15 Gram(s) Oral once PRN Blood Glucose LESS THAN 70 milliGRAM(s)/deciliter  glucagon  Injectable 1 milliGRAM(s) IntraMuscular once PRN Glucose LESS THAN 70 milligrams/deciliter  ibuprofen  Tablet. 600 milliGRAM(s) Oral every 6 hours PRN mild pain (1-3) not relieved with Acetaminophen  oxyCODONE    IR 5 milliGRAM(s) Oral every 6 hours PRN Moderate Pain (4 - 6)  zinc oxide 20% Ointment 1 Application(s) Topical every 6 hours PRN Perineal discomfort from fistula      Labs:  reviewed                          8.3    3.61  )-----------( 458      ( 2019 07:22 )             27.1       139  |  99  |  6<L>  ----------------------------<  155<H>  3.0<L>   |  28  |  0.51    Ca    9.1      2019 07:22  Phos  3.4       Mg     1.6         TPro  6.0  /  Alb  2.7<L>  /  TBili  0.2  /  DBili  x   /  AST  27  /  ALT  17  /  AlkPhos  87      Imaging:  Reviewed  EXAM:  MR PELVIS IC OC                        PROCEDURE DATE:  2019    INTERPRETATION:  ATTENDING RADIOLOGIST ADDENDUM: AGREE WITH THE BELOW   REPORT.  [facility]  Preliminary Radiology Report  Call: [phone]  assistance Online chat: [email or web address]  Patient Name: JOHN BAKER MRN: [id number]   (Age): 1960 59 Gender: F  Date of Exam: 2019 Accession: [id number]  Referring Physician: SHAKEEL PAIGE # of Images: [phone]  Ordered As: MR PELVIS W  EXAM:  MR Pelvis With Contrast  EXAM DATE/TIME:  2019 2:15 PM  CLINICAL HISTORY:  59 years old, female; Condition or disease; Other: HX endometrial   carcinoma. Rectovaginal and  enterovaginal fistula; Prior surgery; Surgery date: 6+ months; Surgery   type: Tom-bso and colostomy  TECHNIQUE: Magnetic resonance images of the pelvis with intravenous contrast.  COMPARISON: QN CT ABDOMEN PELVIS WITH CONTRAST 2019 9:14:55 PM  FINDINGS:  Tubes, catheters and devices: Westbrook catheter in the bladder.   Mild-to-moderate diffuse bladder wall  thickening and enhancement with surrounding edematous change suggestive   of cystitis.   Intraperitoneal space: No free fluid.  Reproductive: Previous hysterectomy and bilateral oophorectomy. Air in   the vagina. Enterovaginal fistula seen on previous CT and not as   well-demonstrated on this exam.  Previous distal colectomy, and colostomy at the left lower quadrant.  IMPRESSION:  1. Previous hysterectomy and bilateral oophorectomy. Enterovaginal   fistula seen on previous CT and  not as well-demonstrated on this exam, likely due to exam limitation. 2   fistula between small bowel and vagina was clearly demonstrated on recent   CT with enteric contrast, best on CT coronal image 37.  2. Westbrook catheter in the bladder. Mild-to-moderate diffuse bladder wall   thickening and enhancement  with surrounding edematous change suggestive of a nonspecific cystitis.   Correlate clinically.    MERLIN TODD M.D., RADIOLOGY RESIDENT  This document has been electronically signed.  STAN WILSON M.D., ATTENDING RADIOLOGIST  This document has been electronically signed. 2019 11:24AM      PEx:  Vital Signs Last 24 Hrs  T(C): 36.2 (2019 13:10), Max: 37 (2019 05:50)  T(F): 97.2 (2019 13:10), Max: 98.6 (2019 05:50)  HR: 68 (2019 13:10) (68 - 78)  BP: 138/74 (2019 09:00) (130/82 - 147/84)  BP(mean): --  RR: 18 (2019 13:10) (16 - 18)  SpO2: 95% (2019 13:10) (94% - 95%)    General: Alert, oriented x3, labile, communicative   HEENT:  Atraumatic, normocephalic , + white film localized to  tongue  Neck: Neck supple   CVS: S1, S2 + regular rate and rhythm   Resp: Bilateral air entry, no wheezing, no rhonchi   GI: Abdomen soft, nontender, nondistended, bowel sounds +, Colostomy + stoma pink, + watery, brown stools  :  Westbrook in situ decreasing amount of stool noted in BSD bag  Musc: Able to move all extremities   Neuro: No focal deficits   Psych:  Calm, and Cooperative   Skin: Intact, Normal   Lymph: No LAD  Preadmit Karnofsky:  60%           Current Karnofsky:  50- 60%  Cachexia (Y/N): No  BMI: 25.    Advanced Directives:     Full Code      decision maker: At this time, patient is currently capacity make all health care related decisions for herself.  Legal surrogate: Patient has officially designated her sister, Esha Hyatt as her primary Health Care Proxy, and Ashlyn Baker as her secondary HCP. Documentation filled and placed in chart. Contact #[phone], #[phone]          REFERRALS	        Palliative Med        Unit SW/Case Mgmt       Music Therapy        Nutrition       PT/OT

## 2019-02-26 NOTE — PROGRESS NOTE ADULT - SUBJECTIVE AND OBJECTIVE BOX
Pt evaluated at bedside. She reports feeling mild epigastric discomfort and continued weakness.  She denies fever/chills, HA, dizziness SOB, CP, palpitations, n/v. She has not ambulated out of bed.    T(C): 37 (02-26-19 @ 05:50), Max: 37 (02-26-19 @ 05:50)  HR: 78 (02-26-19 @ 05:50) (73 - 78)  BP: 147/84 (02-26-19 @ 05:50) (130/82 - 147/84)  RR: 18 (02-26-19 @ 05:50) (17 - 18)  SpO2: 94% (02-26-19 @ 05:50) (94% - 94%)  GA: NAD, A+OX3  Abd: +BS, soft, NTND, no rebound or guarding, LLQ ostomy w/ green stool  : nystatin powder on perineum, no erythema, small amount of green liquid noted beneath abraham consistent w/ passage of stool  Extrem: no calf tenderness    02-25 @ 07:01  -  02-26 @ 07:00  --------------------------------------------------------  IN: 1500 mL / OUT: 3350 mL / NET: -1850 mL                          7.9    3.82  )-----------( 347      ( 25 Feb 2019 06:25 )             25.7     02-25    139  |  100  |  8   ----------------------------<  118<H>  3.8   |  25  |  0.55    Ca    9.0      25 Feb 2019 06:26  Phos  4.3     02-25  Mg     1.8     02-25    TPro  6.0  /  Alb  2.7<L>  /  TBili  0.2  /  DBili  x   /  AST  27  /  ALT  17  /  AlkPhos  87  02-25    MEDICATIONS  (STANDING):  dextrose 5%. 1000 milliLiter(s) (50 mL/Hr) IV Continuous <Continuous>  dextrose 50% Injectable 12.5 Gram(s) IV Push once  dextrose 50% Injectable 25 Gram(s) IV Push once  dextrose 50% Injectable 25 Gram(s) IV Push once  enoxaparin Injectable 40 milliGRAM(s) SubCutaneous daily  insulin lispro (HumaLOG) corrective regimen sliding scale   SubCutaneous Before meals and at bedtime  lactated ringers. 1000 milliLiter(s) (100 mL/Hr) IV Continuous <Continuous>  nystatin    Suspension 376330 Unit(s) Oral four times a day  nystatin Powder 1 Application(s) Topical two times a day  octreotide  Injectable 50 MICROGram(s) IV Push every 24 hours  pantoprazole  Injectable 40 milliGRAM(s) IV Push every 24 hours    MEDICATIONS  (PRN):  acetaminophen   Tablet .. 650 milliGRAM(s) Oral every 6 hours PRN Temp greater or equal to 38C (100.4F), Mild Pain (1 - 3)  dextrose 40% Gel 15 Gram(s) Oral once PRN Blood Glucose LESS THAN 70 milliGRAM(s)/deciliter  glucagon  Injectable 1 milliGRAM(s) IntraMuscular once PRN Glucose LESS THAN 70 milligrams/deciliter  ibuprofen  Tablet. 600 milliGRAM(s) Oral every 6 hours PRN mild pain (1-3) not relieved with Acetaminophen  oxyCODONE    IR 5 milliGRAM(s) Oral every 6 hours PRN Moderate Pain (4 - 6)  zinc oxide 20% Ointment 1 Application(s) Topical every 6 hours PRN Perineal discomfort from fistula

## 2019-02-26 NOTE — PROGRESS NOTE ADULT - ASSESSMENT
60yo G0 with PMHx significant for breast cancer, diabetes mellitus, endometrial cancer stage 3, s/p SHAMIR, BSO, pelvic lymphadenectomy, low anterior resection w/ end colostomy on 7/30/18 now with rectovaginal fistula, transferred to [facility] from [facility] for care with Dr. Spivey.    Recs:  - Discussed with   - No acute intervention now  - No acute surgical intervention, may consider surgery if patient condition change  - Plan by primary team  - Team 4C will follow

## 2019-02-27 DIAGNOSIS — R11.0 NAUSEA: ICD-10-CM

## 2019-02-27 LAB
ALBUMIN SERPL ELPH-MCNC: 3.1 G/DL — LOW (ref 3.3–5)
ALP SERPL-CCNC: 81 U/L — SIGNIFICANT CHANGE UP (ref 40–120)
ALT FLD-CCNC: 16 U/L — SIGNIFICANT CHANGE UP (ref 10–45)
ANION GAP SERPL CALC-SCNC: 9 MMOL/L — SIGNIFICANT CHANGE UP (ref 5–17)
APTT BLD: 29.2 SEC — SIGNIFICANT CHANGE UP (ref 27.5–36.3)
AST SERPL-CCNC: 15 U/L — SIGNIFICANT CHANGE UP (ref 10–40)
BASOPHILS # BLD AUTO: 0.01 K/UL — SIGNIFICANT CHANGE UP (ref 0–0.2)
BASOPHILS NFR BLD AUTO: 0.1 % — SIGNIFICANT CHANGE UP (ref 0–2)
BILIRUB DIRECT SERPL-MCNC: <0.2 MG/DL — SIGNIFICANT CHANGE UP (ref 0–0.2)
BILIRUB SERPL-MCNC: <0.2 MG/DL — SIGNIFICANT CHANGE UP (ref 0.2–1.2)
BUN SERPL-MCNC: 11 MG/DL — SIGNIFICANT CHANGE UP (ref 7–23)
CALCIUM SERPL-MCNC: 8.4 MG/DL — SIGNIFICANT CHANGE UP (ref 8.4–10.5)
CHLORIDE SERPL-SCNC: 96 MMOL/L — SIGNIFICANT CHANGE UP (ref 96–108)
CO2 SERPL-SCNC: 30 MMOL/L — SIGNIFICANT CHANGE UP (ref 22–31)
CREAT SERPL-MCNC: 0.48 MG/DL — LOW (ref 0.5–1.3)
EOSINOPHIL # BLD AUTO: 0.01 K/UL — SIGNIFICANT CHANGE UP (ref 0–0.5)
EOSINOPHIL NFR BLD AUTO: 0.1 % — SIGNIFICANT CHANGE UP (ref 0–6)
GLUCOSE BLDC GLUCOMTR-MCNC: 144 MG/DL — HIGH (ref 70–99)
GLUCOSE BLDC GLUCOMTR-MCNC: 203 MG/DL — HIGH (ref 70–99)
GLUCOSE BLDC GLUCOMTR-MCNC: 238 MG/DL — HIGH (ref 70–99)
GLUCOSE BLDC GLUCOMTR-MCNC: 242 MG/DL — HIGH (ref 70–99)
GLUCOSE SERPL-MCNC: 318 MG/DL — HIGH (ref 70–99)
HCT VFR BLD CALC: 26 % — LOW (ref 34.5–45)
HGB BLD-MCNC: 8.1 G/DL — LOW (ref 11.5–15.5)
IMM GRANULOCYTES NFR BLD AUTO: 0.5 % — SIGNIFICANT CHANGE UP (ref 0–1.5)
INR BLD: 1.11 — SIGNIFICANT CHANGE UP (ref 0.88–1.16)
LYMPHOCYTES # BLD AUTO: 1.27 K/UL — SIGNIFICANT CHANGE UP (ref 1–3.3)
LYMPHOCYTES # BLD AUTO: 13.2 % — SIGNIFICANT CHANGE UP (ref 13–44)
MAGNESIUM SERPL-MCNC: 1.8 MG/DL — SIGNIFICANT CHANGE UP (ref 1.6–2.6)
MCHC RBC-ENTMCNC: 28.8 PG — SIGNIFICANT CHANGE UP (ref 27–34)
MCHC RBC-ENTMCNC: 31.2 GM/DL — LOW (ref 32–36)
MCV RBC AUTO: 92.5 FL — SIGNIFICANT CHANGE UP (ref 80–100)
MONOCYTES # BLD AUTO: 0.75 K/UL — SIGNIFICANT CHANGE UP (ref 0–0.9)
MONOCYTES NFR BLD AUTO: 7.8 % — SIGNIFICANT CHANGE UP (ref 2–14)
NEUTROPHILS # BLD AUTO: 7.5 K/UL — HIGH (ref 1.8–7.4)
NEUTROPHILS NFR BLD AUTO: 78.3 % — HIGH (ref 43–77)
NRBC # BLD: 0 /100 WBCS — SIGNIFICANT CHANGE UP (ref 0–0)
PHOSPHATE SERPL-MCNC: 3.2 MG/DL — SIGNIFICANT CHANGE UP (ref 2.5–4.5)
PLATELET # BLD AUTO: 458 K/UL — HIGH (ref 150–400)
POTASSIUM SERPL-MCNC: 3.6 MMOL/L — SIGNIFICANT CHANGE UP (ref 3.5–5.3)
POTASSIUM SERPL-SCNC: 3.6 MMOL/L — SIGNIFICANT CHANGE UP (ref 3.5–5.3)
PROT SERPL-MCNC: 5.8 G/DL — LOW (ref 6–8.3)
PROTHROM AB SERPL-ACNC: 12.6 SEC — SIGNIFICANT CHANGE UP (ref 10–12.9)
RBC # BLD: 2.81 M/UL — LOW (ref 3.8–5.2)
RBC # FLD: 14.7 % — HIGH (ref 10.3–14.5)
SODIUM SERPL-SCNC: 135 MMOL/L — SIGNIFICANT CHANGE UP (ref 135–145)
WBC # BLD: 9.59 K/UL — SIGNIFICANT CHANGE UP (ref 3.8–10.5)
WBC # FLD AUTO: 9.59 K/UL — SIGNIFICANT CHANGE UP (ref 3.8–10.5)

## 2019-02-27 PROCEDURE — 76937 US GUIDE VASCULAR ACCESS: CPT | Mod: 26,59

## 2019-02-27 PROCEDURE — 36569 INSJ PICC 5 YR+ W/O IMAGING: CPT

## 2019-02-27 PROCEDURE — 99232 SBSQ HOSP IP/OBS MODERATE 35: CPT

## 2019-02-27 PROCEDURE — 99233 SBSQ HOSP IP/OBS HIGH 50: CPT

## 2019-02-27 RX ORDER — ENOXAPARIN SODIUM 100 MG/ML
40 INJECTION SUBCUTANEOUS DAILY
Qty: 0 | Refills: 0 | Status: DISCONTINUED | OUTPATIENT
Start: 2019-02-27 | End: 2019-03-07

## 2019-02-27 RX ORDER — SODIUM CHLORIDE 9 MG/ML
10 INJECTION INTRAMUSCULAR; INTRAVENOUS; SUBCUTANEOUS
Qty: 0 | Refills: 0 | Status: DISCONTINUED | OUTPATIENT
Start: 2019-02-27 | End: 2019-03-25

## 2019-02-27 RX ORDER — OXYCODONE HYDROCHLORIDE 5 MG/1
5 TABLET ORAL EVERY 6 HOURS
Qty: 0 | Refills: 0 | Status: DISCONTINUED | OUTPATIENT
Start: 2019-02-27 | End: 2019-02-27

## 2019-02-27 RX ORDER — CHLORHEXIDINE GLUCONATE 213 G/1000ML
1 SOLUTION TOPICAL
Qty: 0 | Refills: 0 | Status: DISCONTINUED | OUTPATIENT
Start: 2019-02-28 | End: 2019-03-25

## 2019-02-27 RX ORDER — ELECTROLYTE SOLUTION,INJ
1 VIAL (ML) INTRAVENOUS
Qty: 0 | Refills: 0 | Status: DISCONTINUED | OUTPATIENT
Start: 2019-02-27 | End: 2019-02-27

## 2019-02-27 RX ORDER — OXYCODONE HYDROCHLORIDE 5 MG/1
10 TABLET ORAL EVERY 6 HOURS
Qty: 0 | Refills: 0 | Status: DISCONTINUED | OUTPATIENT
Start: 2019-02-27 | End: 2019-02-28

## 2019-02-27 RX ADMIN — Medication 1 EACH: at 17:51

## 2019-02-27 RX ADMIN — Medication 500000 UNIT(S): at 06:36

## 2019-02-27 RX ADMIN — Medication 500000 UNIT(S): at 17:34

## 2019-02-27 RX ADMIN — SODIUM CHLORIDE 100 MILLILITER(S): 9 INJECTION, SOLUTION INTRAVENOUS at 05:31

## 2019-02-27 RX ADMIN — Medication 500000 UNIT(S): at 23:40

## 2019-02-27 RX ADMIN — PANTOPRAZOLE SODIUM 40 MILLIGRAM(S): 20 TABLET, DELAYED RELEASE ORAL at 08:10

## 2019-02-27 RX ADMIN — NYSTATIN CREAM 1 APPLICATION(S): 100000 CREAM TOPICAL at 06:36

## 2019-02-27 RX ADMIN — SODIUM CHLORIDE 100 MILLILITER(S): 9 INJECTION, SOLUTION INTRAVENOUS at 06:36

## 2019-02-27 RX ADMIN — Medication 4: at 12:26

## 2019-02-27 RX ADMIN — Medication 500000 UNIT(S): at 12:26

## 2019-02-27 RX ADMIN — Medication 4: at 08:10

## 2019-02-27 RX ADMIN — ENOXAPARIN SODIUM 40 MILLIGRAM(S): 100 INJECTION SUBCUTANEOUS at 19:15

## 2019-02-27 RX ADMIN — Medication 4: at 21:27

## 2019-02-27 NOTE — ADVANCED PRACTICE NURSE CONSULT - RECOMMEDATIONS
Recommend using antifungal moisture barrier instead of antifungal powder. Spoke with ANTONIO Coronado and house staff.

## 2019-02-27 NOTE — PROGRESS NOTE ADULT - SUBJECTIVE AND OBJECTIVE BOX
Subjective:  Doing well, afebrile, epigastric discomfort has improved, 3 episodes of stool per vagina over 24 hours      Vital Signs Last 24 Hrs  T(C): 37.2 (2019 09:25), Max: 37.4 (2019 05:37)  T(F): 98.9 (2019 09:25), Max: 99.3 (2019 05:37)  HR: 80 (2019 09:) (68 - 84)  BP: 138/74 (2019 09:25) (127/79 - 148/88)  BP(mean): --  RR: 17 (2019 09:25) (17 - 18)  SpO2: 96% (2019 09:25) (94% - 99%)    Physical Exam:  General: NAD, resting comfortably in bed  Pulmonary: Nonlabored breathing, no respiratory distress  Abdominal: soft, NT/ND  LLQ ostomy in site with soft stool    LABS:                        8.1    9.59  )-----------( 458      ( 2019 11:01 )             26.0     02    135  |  96  |  11  ----------------------------<  318<H>  3.6   |  30  |  0.48<L>    Ca    8.4      2019 11:01  Phos  3.2       Mg     1.8         TPro  5.8<L>  /  Alb  3.1<L>  /  TBili  <0.2  /  DBili  <0.2  /  AST  15  /  ALT  16  /  AlkPhos  81  27    PT/INR - ( 2019 11:01 )   PT: 12.6 sec;   INR: 1.11          PTT - ( 2019 11:01 )  PTT:29.2 sec  CAPILLARY BLOOD GLUCOSE      POCT Blood Glucose.: 238 mg/dL (2019 11:42)  POCT Blood Glucose.: 242 mg/dL (2019 07:38)  POCT Blood Glucose.: 165 mg/dL (2019 20:58)  POCT Blood Glucose.: 109 mg/dL (2019 16:20)    Urinalysis Basic - ( 2019 19:51 )    Color: Yellow / Appearance: Clear / S.015 / pH: x  Gluc: x / Ketone: Trace mg/dL  / Bili: Negative / Urobili: 0.2 E.U./dL   Blood: x / Protein: 30 mg/dL / Nitrite: POSITIVE   Leuk Esterase: Moderate / RBC: < 5 /HPF / WBC Many /HPF   Sq Epi: x / Non Sq Epi: Moderate /HPF / Bacteria: Present /HPF      LIVER FUNCTIONS - ( 2019 11:01 )  Alb: 3.1 g/dL / Pro: 5.8 g/dL / ALK PHOS: 81 U/L / ALT: 16 U/L / AST: 15 U/L / GGT: x

## 2019-02-27 NOTE — PROGRESS NOTE ADULT - ASSESSMENT
58yo G0 w/ known stage IV endometrial adenocarcinoma s/p staging surgery '18 and 1 round of chemotherapy 3 wks prior admitted for management of symptomatic rectovaginal and enterovaginal fistulas, stable    1. ID: urine culture on admission negative, repeat UA 2/26 with + nitrites, repeat urine culture pending  2. FEN/GI - NPO, IVH, replete electrolytes PRN, IV Octreotide daily, PPN started 2/26, plan for PICC today  3. PAIN - OPM PRN  4.  - joseph in place, small amount of leakage of stool per vagina this morning, general surgery consulted for recommendations on management of rectovaginal/enterovaginal fistulas, reccs appreciated, per note they are not recommending surgical intervention at this time  5. Endocrine- ISS while patient remains NPO, holding home metformin at this time  6. RESP-  satting well on room air  8. VTE prophylaxis - SCDs, ambulate as tolerated. Lovenox 40qd, PT    9. PT recommending LITO however patient does not want LITO  - family discussion to take plance 2/28 w/ sister, Dr. Arias and Dr. Spivey regarding goals of care

## 2019-02-27 NOTE — ADVANCED PRACTICE NURSE CONSULT - ASSESSMENT
60yo G0 w/ known stage IV endometrial adenocarcinoma s/p staging surgery '18 and 1 round of chemotherapy 3 wks prior admitted for management of symptomatic rectovaginal and enterovaginal fistulas, stable. Pt with erythema and satellite lesions at inner upper thighs bilaterally, appears to be fungal rash, pt c/o itching.

## 2019-02-27 NOTE — PROCEDURE NOTE - NSPROCDETAILS_GEN_ALL_CORE
location identified, draped/prepped, sterile technique used/sterile dressing applied/supine position/ultrasound guidance/sterile technique, catheter placed/ultrasound assessment

## 2019-02-27 NOTE — PROGRESS NOTE ADULT - PROBLEM SELECTOR PLAN 1
Likely 2/2 UTI/Cystitis c/b Enterovaginal Fistula.   Denies vaginal pain now, reports 0/10 pain, earlier 2-3/10.  - Pt received 1 dose Oxycodone IR 10mg in the last 24 hrs.  -Continue Motrin 600mg q6h PRN mild pain.   - Continue Tylenol 650mg q6h PRN mild pain.   - Continue Oxycodone IR 5mg q6h PRN moderate pain.   - Continue Oxycodone IR 10mg q6h PRN severe pain.  - Reports nausea with one episode of vomiting (small quantity this AM)- educated patient to request pain medication as all medications available as needed.   - Ensure bowel regimen as needed. Consider Senna if reduced output in Colostomy bag- currently brown/watery stools.

## 2019-02-27 NOTE — PROGRESS NOTE ADULT - SUBJECTIVE AND OBJECTIVE BOX
Pt evaluated at bedside, reports no abdominal or pelvic pain, continues to feel weak. She denies fever/chills, HA, dizziness, CP, palpitations, SOB, n/v. She ambulated out of bed w/ PT to chair yesterday.    T(C): 37.4 (02-27-19 @ 05:37), Max: 37.4 (02-27-19 @ 05:37)  HR: 84 (02-27-19 @ 05:37) (80 - 84)  BP: 127/79 (02-27-19 @ 05:37) (127/79 - 148/88)  RR: 18 (02-27-19 @ 05:37) (18 - 18)  SpO2: 94% (02-27-19 @ 05:37) (94% - 99%)  GA: NAD, A+OX3  Abd: +BS, soft, NTND, no rebound or guarding, LLQ ostomy w/ green stool and gas  : nystatin powder on perineum, no erythema, small amount of green liquid noted beneath abraham consistent w/ passage of stool  Extrem: no calf tenderness    02-26 @ 07:01  -  02-27 @ 07:00  --------------------------------------------------------  IN: 3250.2 mL / OUT: 4150 mL / NET: -899.8 mL                        8.3    3.61  )-----------( 458      ( 26 Feb 2019 07:22 )             27.1     02-26    139  |  99  |  6<L>  ----------------------------<  155<H>  3.0<L>   |  28  |  0.51    Ca    9.1      26 Feb 2019 07:22  Phos  3.4     02-26  Mg     1.6     02-26    MEDICATIONS  (STANDING):  dextrose 5%. 1000 milliLiter(s) (50 mL/Hr) IV Continuous <Continuous>  dextrose 50% Injectable 12.5 Gram(s) IV Push once  dextrose 50% Injectable 25 Gram(s) IV Push once  dextrose 50% Injectable 25 Gram(s) IV Push once  enoxaparin Injectable 40 milliGRAM(s) SubCutaneous daily  escitalopram 10 milliGRAM(s) Oral at bedtime  insulin lispro (HumaLOG) corrective regimen sliding scale   SubCutaneous Before meals and at bedtime  lactated ringers. 1000 milliLiter(s) (100 mL/Hr) IV Continuous <Continuous>  nystatin    Suspension 904886 Unit(s) Oral four times a day  nystatin Powder 1 Application(s) Topical two times a day  octreotide  Injectable 50 MICROGram(s) IV Push every 24 hours  pantoprazole  Injectable 40 milliGRAM(s) IV Push every 24 hours  Parenteral Nutrition - Adult 1 Each (83 mL/Hr) TPN Continuous <Continuous>    MEDICATIONS  (PRN):  acetaminophen   Tablet .. 650 milliGRAM(s) Oral every 6 hours PRN Temp greater or equal to 38C (100.4F), Mild Pain (1 - 3)  dextrose 40% Gel 15 Gram(s) Oral once PRN Blood Glucose LESS THAN 70 milliGRAM(s)/deciliter  glucagon  Injectable 1 milliGRAM(s) IntraMuscular once PRN Glucose LESS THAN 70 milligrams/deciliter  ibuprofen  Tablet. 600 milliGRAM(s) Oral every 6 hours PRN mild pain (1-3) not relieved with Acetaminophen  oxyCODONE    IR 10 milliGRAM(s) Oral every 6 hours PRN Severe Pain (7 - 10)  oxyCODONE    IR 5 milliGRAM(s) Oral every 6 hours PRN Moderate Pain (4 - 6)  zinc oxide 20% Ointment 1 Application(s) Topical every 6 hours PRN Perineal discomfort from fistula

## 2019-02-27 NOTE — PROGRESS NOTE ADULT - SUBJECTIVE AND OBJECTIVE BOX
GYN PROGRESS NOTE    Patient evaluated at the bedside. Interval events significant for placement of R PICC line. Patient reports minimal pain today but that she is tired. She has not ambulated as she feels the need to rest. She reports some ongoing leaking. Patient remains NPO.     O:   T(C): 36.9 (02-27-19 @ 14:51), Max: 37.4 (02-27-19 @ 05:37)  HR: 74 (02-27-19 @ 14:51) (74 - 84)  BP: 145/87 (02-27-19 @ 14:51) (127/79 - 148/88)  RR: 17 (02-27-19 @ 14:51) (17 - 18)  SpO2: 95% (02-27-19 @ 14:51) (94% - 99%)  Wt(kg): --    GEN: sleeping comfortably   LUNGS: no respiratory distress  ABD: soft, nontender, functioning ostomy   Pelvic: vaginal erythema is much improved, catheter draining cloudy urine   EXT: no calf tenderness        02-26 @ 07:01  -  02-27 @ 07:00  --------------------------------------------------------  IN: 3250.2 mL / OUT: 4150 mL / NET: -899.8 mL    02-27 @ 07:01  -  02-27 @ 16:14  --------------------------------------------------------  IN: 0 mL / OUT: 650 mL / NET: -650 mL

## 2019-02-27 NOTE — CONSULT NOTE ADULT - SUBJECTIVE AND OBJECTIVE BOX
Vascular Access Service Consult Note    59yFemaleHEALTH ISSUES - PROBLEM Dx:  Advanced care planning/counseling discussion: Advanced care planning/counseling discussion  Palliative care by specialist: Palliative care by specialist  Endometrial cancer: Endometrial cancer  Enterovaginal fistula: Enterovaginal fistula  Oral thrush: Oral thrush  Vaginal itching: Vaginal itching  Vaginal pain: Vaginal pain             Diagnosis: enterovaginal fistula     Indications for Vascular Access (Check all that apply)  [  ]  Antibiotic Therapy       Antibiotic Prescribed:                                                                             Expected Duration of Therapy:               [  ]  IV Hydration  [  X]  Total Parenteral Nutrition  [  ]  Chemotherapy  [  ]  Difficult Venous Access  [  ]  CVP monitoring  [  ]  Medications with high potential for tissue necrosis on extravasation  [  ]  Other    Screening (Check all that apply)  Previous Radiation to chest  [ X ] Yes      [  ]  No  Breast Cancer                          [X ] Left     [  ]  Right    [  ]  No  Lymph Node Dissection         [  ] Left     [  ]  Right    [ X ]  No  Pacemaker or ICD                   [  ] Left     [  ]  Right    [ X ]  No  Upper Extremity DVT             [  ] Left     [  ]  Right    [  X]  No  Chronic Kidney Disease         [  ]  Yes     [  X]  No  Hemodialysis                           [  ]  Yes     [ X ]  No  AV Fistula/ Graft                     [  ]  Left    [  ]  Right    [X  ]  No  Temp>101F in past 24 H       [  ]  Yes     [ X ]  No  H/O PICC/Midline                   [  ]  Yes     [ X ]  No      ** chemo port on R side, as per IR and oncology OK to place on R **     Lab data:                        8.3    3.61  )-----------( 458      ( 26 Feb 2019 07:22 )             27.1     02-26    139  |  99  |  6<L>  ----------------------------<  155<H>  3.0<L>   |  28  |  0.51    Ca    9.1      26 Feb 2019 07:22  Phos  3.4     02-26  Mg     1.6     02-26                I have reviewed the chart, interviewed and examined the patient and determined that this patient:  [  X] Is a candidate for a PICC line  [  ] Is a candidate for a Midline  [  ] Is not a candidate for vascular access device (reason)    Lumens:    [X  ] Single  [  ] Double

## 2019-02-27 NOTE — PROGRESS NOTE ADULT - SUBJECTIVE AND OBJECTIVE BOX
JOHN MCDANIEL          MRN-[id number]            (1960)      HPI:  A 58 y/o G0 with PMH significant for Breast Cancer, Diabetes Mellitus, Endometrial Cancer stage 3, s/p exploratory laparotomy, enterolysis, TOM, BSO, pelvic lymphadenectomy, low anterior resection mobilization of splenic flexure, end colostomy on 18 now with rectovaginal fistula. She was admitted to [facility] one week ago for treatment of UTI, and now transferred to [facility] for further care. MRI showed enterovaginal and rectovaginal fistula. Surgery team following patient- no surgical intervention offered. Per PT evaluation patient to be discharged to [facility]. Palliative consulted to evaluated patient for symptoms- pain.     Cancer History: G0, endometrial cancer dx in 2018; h/o breast cancer in  s/p chemo and radiation with left breast lumpectomy She has undergone one round of chemotherapy approximately 3 weeks ago; to complete second cycle of chemotherapy this Monday, .     Today: Patient seen and evaluated at bedside. Remains alert, oriented x3. Reports no pain currently, states that she took 1 dose of Oxyocodone 10mg IR yesterday with marked improvement in her pain symptoms. This morning states she had one episode of vomiting (NB/NB) small in quantity. No further complaints.     ROS:  Decreased pain, decreased vaginal itching                     Dyspnea (Alfredo 0-10):   0   -                   N/V (Y/N):             No                 Secretions (Y/N) :        No         Agitation(Y/N):  No  Pain (Y/N):     Yes ,mild  Vaginal Pain  -Provocation/Palliation: Increased pain with voiding, relieved with  1 dose of Tylenol over past 24 hrs   -Quality/Quantity: Sharp, Burning   -Radiating: Nonradiating   -Severity: 2-3/10 presently   -Timing/Frequency: Intermittently occurring   -Impact on ADLs: loss of autonomy     Allergies    Allergy Status Unknown    Intolerances    Opiate Naive (Y/N): Y  -iStop reviewed (Y/N): Y on initial consultation (Ref#:  [id number] )  Alprazolam 0.25m, Lorazepam 0.5mg     MEDICATIONS: REVIEWED  MEDICATIONS  (STANDING):  dextrose 5%. 1000 milliLiter(s) (50 mL/Hr) IV Continuous <Continuous>  dextrose 50% Injectable 12.5 Gram(s) IV Push once  dextrose 50% Injectable 25 Gram(s) IV Push once  dextrose 50% Injectable 25 Gram(s) IV Push once  escitalopram 10 milliGRAM(s) Oral at bedtime  insulin lispro (HumaLOG) corrective regimen sliding scale   SubCutaneous Before meals and at bedtime  lactated ringers. 1000 milliLiter(s) (100 mL/Hr) IV Continuous <Continuous>  nystatin    Suspension 612165 Unit(s) Oral four times a day  nystatin Powder 1 Application(s) Topical two times a day  pantoprazole  Injectable 40 milliGRAM(s) IV Push every 24 hours  Parenteral Nutrition - Adult 1 Each (83 mL/Hr) TPN Continuous <Continuous>  Parenteral Nutrition - Adult 1 Each (83 mL/Hr) TPN Continuous <Continuous>    MEDICATIONS  (PRN):  acetaminophen   Tablet .. 650 milliGRAM(s) Oral every 6 hours PRN Temp greater or equal to 38C (100.4F), Mild Pain (1 - 3)  dextrose 40% Gel 15 Gram(s) Oral once PRN Blood Glucose LESS THAN 70 milliGRAM(s)/deciliter  glucagon  Injectable 1 milliGRAM(s) IntraMuscular once PRN Glucose LESS THAN 70 milligrams/deciliter  ibuprofen  Tablet. 600 milliGRAM(s) Oral every 6 hours PRN mild pain (1-3) not relieved with Acetaminophen  oxyCODONE    IR 10 milliGRAM(s) Oral every 6 hours PRN Severe Pain (7 - 10)  oxyCODONE    IR 5 milliGRAM(s) Oral every 6 hours PRN Moderate Pain (4 - 6)  sodium chloride 0.9% lock flush 10 milliLiter(s) IV Push every 1 hour PRN Pre/post blood products, medications, blood draw, and to maintain line patency    Lab Results: reviewed                         8.1    9.59  )-----------( 458      ( 2019 11:01 )             26.0     02-    135  |  96  |  11  ----------------------------<  318<H>  3.6   |  30  |  0.48<L>    Ca    8.4      2019 11:01  Phos  3.2     02-  Mg     1.8         TPro  5.8<L>  /  Alb  3.1<L>  /  TBili  <0.2  /  DBili  <0.2  /  AST  15  /  ALT  16  /  AlkPhos  81      PT/INR - ( 2019 11:01 )   PT: 12.6 sec;   INR: 1.11          PTT - ( 2019 11:01 )  PTT:29.2 sec  Urinalysis Basic - ( 2019 19:51 )    Color: Yellow / Appearance: Clear / S.015 / pH: x  Gluc: x / Ketone: Trace mg/dL  / Bili: Negative / Urobili: 0.2 E.U./dL   Blood: x / Protein: 30 mg/dL / Nitrite: POSITIVE   Leuk Esterase: Moderate / RBC: < 5 /HPF / WBC Many /HPF   Sq Epi: x / Non Sq Epi: Moderate /HPF / Bacteria: Present /HPF  Imaging:  Reviewed  EXAM:  MR PELVIS IC OC                        PROCEDURE DATE:  2019    INTERPRETATION:  ATTENDING RADIOLOGIST ADDENDUM: AGREE WITH THE BELOW   REPORT.  [facility]  Preliminary Radiology Report  Call: [phone]  assistance Online chat: [email or web address]  Patient Name: JHON MCDANIEL MRN: [id number]   (Age): 1960 59 Gender: F  Date of Exam: 2019 Accession: [id number]  Referring Physician: SHAKEEL PAIGE # of Images: [phone]  Ordered As: MR PELVIS W  EXAM:  MR Pelvis With Contrast  EXAM DATE/TIME:  2019 2:15 PM  CLINICAL HISTORY:  59 years old, female; Condition or disease; Other: HX endometrial   carcinoma. Rectovaginal and  enterovaginal fistula; Prior surgery; Surgery date: 6+ months; Surgery   type: Tom-bso and colostomy  TECHNIQUE: Magnetic resonance images of the pelvis with intravenous contrast.  COMPARISON: QN CT ABDOMEN PELVIS WITH CONTRAST 2019 9:14:55 PM  FINDINGS:  Tubes, catheters and devices: Westbrook catheter in the bladder.   Mild-to-moderate diffuse bladder wall  thickening and enhancement with surrounding edematous change suggestive   of cystitis.   Intraperitoneal space: No free fluid.  Reproductive: Previous hysterectomy and bilateral oophorectomy. Air in   the vagina. Enterovaginal fistula seen on previous CT and not as   well-demonstrated on this exam.  Previous distal colectomy, and colostomy at the left lower quadrant.  IMPRESSION:  1. Previous hysterectomy and bilateral oophorectomy. Enterovaginal   fistula seen on previous CT and  not as well-demonstrated on this exam, likely due to exam limitation. 2   fistula between small bowel and vagina was clearly demonstrated on recent   CT with enteric contrast, best on CT coronal image 37.  2. Westbrook catheter in the bladder. Mild-to-moderate diffuse bladder wall   thickening and enhancement  with surrounding edematous change suggestive of a nonspecific cystitis.   Correlate clinically.    MERLIN TODD M.D., RADIOLOGY RESIDENT  This document has been electronically signed.  STAN WILSON M.D., ATTENDING RADIOLOGIST  This document has been electronically signed. 2019 11:24AM     PE:  T(C): 37.2 (19 @ 09:25), Max: 37.4 (19 @ 05:37)  HR: 80 (19 @ 09:25) (79 - 84)  BP: 138/74 (19 @ 09:25) (127/79 - 148/88)  RR: 17 (19 @ 09:25) (17 - 18)  SpO2: 96% (19 @ 09:25) (94% - 99%)  Wt(kg): --    General: Alert, oriented x3, communicative   HEENT:  Atraumatic, normocephalic , + white film localized to  tongue  Neck: Neck supple   CVS: S1, S2 + regular rate and rhythm   Resp: Bilateral air entry, no wheezing, no rhonchi   GI: Abdomen soft, nontender, nondistended, bowel sounds +, Colostomy + stoma pink, + watery, brown stools less quantity  :  Westbrook in situ  Musc: Able to move all extremities   Neuro: No focal deficits   Psych:  Calm, and Cooperative   Skin: Intact, Normal   Lymph: No LAD  Preadmit Karnofsky:  60%           Current Karnofsky:  50%  Cachexia (Y/N): No  BMI: 25.7    Advanced Directives:     Full Code    Decision maker: At this time, patient is currently capacity make all health care related decisions for herself.  Legal surrogate: Patient has officially designated her sister, Esha Hyatt as her primary Health Care Proxy, and Ashlyn Bensonmalika as her secondary HCP. Documentation filled and placed in chart. Contact #[phone], #[phone]  	      AGENCY CHOICE DISCUSSED:     [facility]     [facility]  [facility]     OTHER:          REFERRALS	        Palliative Med        Unit SW/Case Mgmt       Music Therapy        Nutrition       PT/OT

## 2019-02-27 NOTE — PROGRESS NOTE ADULT - ASSESSMENT
58yo G0 with PMHx significant for breast cancer, diabetes mellitus, endometrial cancer stage 3, s/p SHAMIR, BSO, pelvic lymphadenectomy, low anterior resection w/ end colostomy on 7/30/18 now with rectovaginal fistula, transferred to [facility] from [facility] for care with Dr. Spivey.    Recs:  - Discussed with   - No acute intervention now  - Would consider surgical intervention if Gyn are taking the patient to the OR for any other indication  - Will sign off for now, Reconsult as needed

## 2019-02-27 NOTE — PROGRESS NOTE ADULT - PROBLEM SELECTOR PLAN 3
With one episode of vomitus (small quantity, NB/NB)  Currently improved, likely 2/2 opioids.   Patient educated this is expected initially with opioids medications, symptom should gradually continue to improve in next few days.   - Consider ant-emetic as needed for nausea/vomiting if symptoms recur/remain.

## 2019-02-27 NOTE — PROGRESS NOTE ADULT - ASSESSMENT
Assessment and Plan:   58yo G0 w/ known stage IV endometrial adenocarcinoma s/p staging surgery '18 and 1 round of chemotherapy 3 wks prior to admission for management of symptomatic rectovaginal and enterovaginal fistulas, stable    1. ID: urine culture on admission negative, repeat UA 2/26 with + nitrites, repeat urine culture pending  2. FEN/GI - NPO, IVH, replete electrolytes PRN, IV Octreotide daily, PPN started 2/26, plan for PICC today; PPN today ordered with 50 mcg Ocretotide.   3. PAIN - OPM PRN  4.  - joseph in place, small amount of leakage of stool per vagina this morning, general surgery consulted for recommendations on management of rectovaginal/enterovaginal fistulas, reccs appreciated, per note they are not recommending surgical intervention at this time  5. Endocrine- ISS while patient remains NPO, holding home metformin at this time  6. RESP-  satting well on room air  8. VTE prophylaxis - SCDs, ambulate as tolerated. Lovenox 40qd, PT    9. PT recommending [facility] however patient does not want [facility]    Consults: Surgery and Palliative care - appreciate recommendations.   - family discussion to take place 2/28 w/ sister, Dr. Arias and Dr. Spivey regarding goals of care

## 2019-02-27 NOTE — PROGRESS NOTE ADULT - PROBLEM SELECTOR PLAN 6
Endometrial cancer dx in 7/2018; h/o breast cancer in 2009 s/p chemo and radiation with left breast lumpectomy She has undergone one round of chemotherapy approximately 3 weeks ago; was to complete second cycle of chemotherapy Monday, 2/25.  - Continue plan per primary/Oncology teams

## 2019-02-27 NOTE — PROGRESS NOTE ADULT - PROBLEM SELECTOR PLAN 8
Per PT recommended [facility] for dispo plan but patient and sister are adamant that pt not return to [facility]. Floor SW involved in case to discuss options.

## 2019-02-27 NOTE — PROCEDURE NOTE - NSPOSTPRCRAD_GEN_A_CORE
line in appropriate postion/ultrasound/picc tip ends at SVC-RA junction, confirmed with 3CG ultrasound

## 2019-02-28 LAB
ALBUMIN SERPL ELPH-MCNC: 3.3 G/DL — SIGNIFICANT CHANGE UP (ref 3.3–5)
ALP SERPL-CCNC: 74 U/L — SIGNIFICANT CHANGE UP (ref 40–120)
ALT FLD-CCNC: 13 U/L — SIGNIFICANT CHANGE UP (ref 10–45)
ANION GAP SERPL CALC-SCNC: 9 MMOL/L — SIGNIFICANT CHANGE UP (ref 5–17)
AST SERPL-CCNC: 11 U/L — SIGNIFICANT CHANGE UP (ref 10–40)
BASOPHILS # BLD AUTO: 0.01 K/UL — SIGNIFICANT CHANGE UP (ref 0–0.2)
BASOPHILS NFR BLD AUTO: 0.2 % — SIGNIFICANT CHANGE UP (ref 0–2)
BILIRUB SERPL-MCNC: <0.2 MG/DL — SIGNIFICANT CHANGE UP (ref 0.2–1.2)
BUN SERPL-MCNC: 14 MG/DL — SIGNIFICANT CHANGE UP (ref 7–23)
CALCIUM SERPL-MCNC: 9 MG/DL — SIGNIFICANT CHANGE UP (ref 8.4–10.5)
CHLORIDE SERPL-SCNC: 99 MMOL/L — SIGNIFICANT CHANGE UP (ref 96–108)
CO2 SERPL-SCNC: 30 MMOL/L — SIGNIFICANT CHANGE UP (ref 22–31)
CREAT SERPL-MCNC: 0.51 MG/DL — SIGNIFICANT CHANGE UP (ref 0.5–1.3)
EOSINOPHIL # BLD AUTO: 0.04 K/UL — SIGNIFICANT CHANGE UP (ref 0–0.5)
EOSINOPHIL NFR BLD AUTO: 0.6 % — SIGNIFICANT CHANGE UP (ref 0–6)
GLUCOSE BLDC GLUCOMTR-MCNC: 163 MG/DL — HIGH (ref 70–99)
GLUCOSE BLDC GLUCOMTR-MCNC: 175 MG/DL — HIGH (ref 70–99)
GLUCOSE BLDC GLUCOMTR-MCNC: 191 MG/DL — HIGH (ref 70–99)
GLUCOSE BLDC GLUCOMTR-MCNC: 216 MG/DL — HIGH (ref 70–99)
GLUCOSE SERPL-MCNC: 171 MG/DL — HIGH (ref 70–99)
HCT VFR BLD CALC: 24.7 % — LOW (ref 34.5–45)
HGB BLD-MCNC: 7.8 G/DL — LOW (ref 11.5–15.5)
IMM GRANULOCYTES NFR BLD AUTO: 0.6 % — SIGNIFICANT CHANGE UP (ref 0–1.5)
LYMPHOCYTES # BLD AUTO: 1.58 K/UL — SIGNIFICANT CHANGE UP (ref 1–3.3)
LYMPHOCYTES # BLD AUTO: 24.4 % — SIGNIFICANT CHANGE UP (ref 13–44)
MAGNESIUM SERPL-MCNC: 1.8 MG/DL — SIGNIFICANT CHANGE UP (ref 1.6–2.6)
MCHC RBC-ENTMCNC: 29.4 PG — SIGNIFICANT CHANGE UP (ref 27–34)
MCHC RBC-ENTMCNC: 31.6 GM/DL — LOW (ref 32–36)
MCV RBC AUTO: 93.2 FL — SIGNIFICANT CHANGE UP (ref 80–100)
MONOCYTES # BLD AUTO: 0.68 K/UL — SIGNIFICANT CHANGE UP (ref 0–0.9)
MONOCYTES NFR BLD AUTO: 10.5 % — SIGNIFICANT CHANGE UP (ref 2–14)
NEUTROPHILS # BLD AUTO: 4.12 K/UL — SIGNIFICANT CHANGE UP (ref 1.8–7.4)
NEUTROPHILS NFR BLD AUTO: 63.7 % — SIGNIFICANT CHANGE UP (ref 43–77)
NRBC # BLD: 0 /100 WBCS — SIGNIFICANT CHANGE UP (ref 0–0)
PHOSPHATE SERPL-MCNC: 3.7 MG/DL — SIGNIFICANT CHANGE UP (ref 2.5–4.5)
PLATELET # BLD AUTO: 393 K/UL — SIGNIFICANT CHANGE UP (ref 150–400)
POTASSIUM SERPL-MCNC: 3.8 MMOL/L — SIGNIFICANT CHANGE UP (ref 3.5–5.3)
POTASSIUM SERPL-SCNC: 3.8 MMOL/L — SIGNIFICANT CHANGE UP (ref 3.5–5.3)
PROT SERPL-MCNC: 6.2 G/DL — SIGNIFICANT CHANGE UP (ref 6–8.3)
RBC # BLD: 2.65 M/UL — LOW (ref 3.8–5.2)
RBC # FLD: 15.5 % — HIGH (ref 10.3–14.5)
SODIUM SERPL-SCNC: 138 MMOL/L — SIGNIFICANT CHANGE UP (ref 135–145)
WBC # BLD: 6.47 K/UL — SIGNIFICANT CHANGE UP (ref 3.8–10.5)
WBC # FLD AUTO: 6.47 K/UL — SIGNIFICANT CHANGE UP (ref 3.8–10.5)

## 2019-02-28 PROCEDURE — 99233 SBSQ HOSP IP/OBS HIGH 50: CPT

## 2019-02-28 PROCEDURE — 99232 SBSQ HOSP IP/OBS MODERATE 35: CPT

## 2019-02-28 RX ORDER — ELECTROLYTE SOLUTION,INJ
1 VIAL (ML) INTRAVENOUS
Qty: 0 | Refills: 0 | Status: DISCONTINUED | OUTPATIENT
Start: 2019-02-28 | End: 2019-02-28

## 2019-02-28 RX ORDER — CEFTRIAXONE 500 MG/1
1 INJECTION, POWDER, FOR SOLUTION INTRAMUSCULAR; INTRAVENOUS EVERY 24 HOURS
Qty: 0 | Refills: 0 | Status: DISCONTINUED | OUTPATIENT
Start: 2019-02-28 | End: 2019-03-01

## 2019-02-28 RX ORDER — I.V. FAT EMULSION 20 G/100ML
50 EMULSION INTRAVENOUS ONCE
Qty: 0 | Refills: 0 | Status: COMPLETED | OUTPATIENT
Start: 2019-02-28 | End: 2019-02-28

## 2019-02-28 RX ORDER — MAGNESIUM SULFATE 500 MG/ML
2 VIAL (ML) INJECTION ONCE
Qty: 0 | Refills: 0 | Status: COMPLETED | OUTPATIENT
Start: 2019-02-28 | End: 2019-02-28

## 2019-02-28 RX ADMIN — CEFTRIAXONE 100 GRAM(S): 500 INJECTION, POWDER, FOR SOLUTION INTRAMUSCULAR; INTRAVENOUS at 18:06

## 2019-02-28 RX ADMIN — ENOXAPARIN SODIUM 40 MILLIGRAM(S): 100 INJECTION SUBCUTANEOUS at 18:07

## 2019-02-28 RX ADMIN — Medication 650 MILLIGRAM(S): at 01:37

## 2019-02-28 RX ADMIN — Medication 500000 UNIT(S): at 17:09

## 2019-02-28 RX ADMIN — Medication 650 MILLIGRAM(S): at 08:30

## 2019-02-28 RX ADMIN — Medication 2: at 17:12

## 2019-02-28 RX ADMIN — Medication 650 MILLIGRAM(S): at 07:38

## 2019-02-28 RX ADMIN — Medication 1 EACH: at 18:32

## 2019-02-28 RX ADMIN — Medication 500000 UNIT(S): at 11:50

## 2019-02-28 RX ADMIN — Medication 2: at 21:37

## 2019-02-28 RX ADMIN — OXYCODONE HYDROCHLORIDE 10 MILLIGRAM(S): 5 TABLET ORAL at 23:27

## 2019-02-28 RX ADMIN — Medication 2: at 11:59

## 2019-02-28 RX ADMIN — Medication 50 GRAM(S): at 17:10

## 2019-02-28 RX ADMIN — Medication 4: at 07:38

## 2019-02-28 RX ADMIN — Medication 500000 UNIT(S): at 05:22

## 2019-02-28 RX ADMIN — CHLORHEXIDINE GLUCONATE 1 APPLICATION(S): 213 SOLUTION TOPICAL at 05:22

## 2019-02-28 RX ADMIN — PANTOPRAZOLE SODIUM 40 MILLIGRAM(S): 20 TABLET, DELAYED RELEASE ORAL at 06:37

## 2019-02-28 RX ADMIN — Medication 650 MILLIGRAM(S): at 02:40

## 2019-02-28 RX ADMIN — ESCITALOPRAM OXALATE 10 MILLIGRAM(S): 10 TABLET, FILM COATED ORAL at 21:37

## 2019-02-28 RX ADMIN — I.V. FAT EMULSION 20.83 GRAM(S): 20 EMULSION INTRAVENOUS at 18:32

## 2019-02-28 RX ADMIN — OXYCODONE HYDROCHLORIDE 10 MILLIGRAM(S): 5 TABLET ORAL at 22:51

## 2019-02-28 NOTE — PROGRESS NOTE ADULT - ASSESSMENT
60yo G0 w/ known stage IV endometrial adenocarcinoma s/p staging surgery '18 and 1 round of chemotherapy 3 wks prior admitted for management of symptomatic rectovaginal and enterovaginal fistulas, stable    1. ID: urine culture on admission negative, repeat UA 2/26 with + nitrites, repeat urine culture pending  2. FEN/GI - NPO, IVH, replete electrolytes PRN, octreotide daily, PPN started 2/26, s/p PICC 2/27, plan for TPN today, will consult GI  3. PAIN - OPM PRN  4.  - joseph in place, intermittent periods of leakage of stool per vagina, none seen this morning, general surgery consulted for recommendations on management of rectovaginal/enterovaginal fistulas, reccs appreciated, per note they are not recommending surgical intervention at this time  5. Endocrine- ISS while patient remains NPO, holding home metformin at this time  6. RESP-  satting well on room air  8. VTE prophylaxis - SCDs, ambulate as tolerated. Lovenox 40qd, PT    9. PT recommending [facility] however patient does not want [facility]  - family discussion to take place today w/ sister, Dr. Arias and Dr. Spivey regarding goals of care

## 2019-02-28 NOTE — CONSULT NOTE ADULT - ASSESSMENT
60yo G0 with PMHx significant for breast cancer, diabetes mellitus, endometrial cancer stage 3, s/p exploratory laparotomy, enterolysis, SHAMIR, BSO, pelvic lymphadenectomy, low anterior resection mobilization of splenic flexure, end colostomy on 7/30/18 now with rectovaginal fistula.  She was admitted to [place] one week ago for treatment of UTI, and now transferred to [facility] for further care. We were consulted to evaluate TPN management    #parenteral nutrition - patient has enterorectal fistula on MRI, decrease output since NPO. Ostomy bag with brown stool.   Patient has appropriate indication for TPN to decrease stool through fistula with plan for surgical revision. Octreotide is appropriate to decrease GI secretions  TPN dosed for increased protein for moderate stress and decreased carbohydrates in view of increase FS glucose; plan to continue until surgical plan better defined   -cont recommendations by nutrition/TPN pharmacist    Please reconsult if we can be of further assistance to the care of the patient

## 2019-02-28 NOTE — PROGRESS NOTE ADULT - PROBLEM SELECTOR PLAN 8
Per PT recommended [facility] for dispo plan but patient and sister are adamant that pt not return to [facility]. Floor SW involved in case to discuss options. Family meeting arranged today with Dr. Spivey, patient and her sister to discuss goals of care. Palliative will be available to assist in ongoing GOC and supportive measures. Ongoing discussions regarding dispo plan with floor SW- Per PT recommended [facility] for dispo plan but patient and sister are adamant that pt not return to [facility].

## 2019-02-28 NOTE — PROGRESS NOTE ADULT - PROBLEM SELECTOR PLAN 6
Endometrial cancer dx in 7/2018; h/o breast cancer in 2009 s/p chemo and radiation with left breast lumpectomy She has undergone one round of chemotherapy approximately 3 weeks ago; was to complete second cycle of chemotherapy Monday, 2/25.  - Continue plan per primary/Oncology teams Endometrial cancer dx in 7/2018; h/o breast cancer in 2009 s/p chemo and radiation with left breast lumpectomy She has undergone one round of chemotherapy approximately 3 weeks ago; was to complete second cycle of chemotherapy Monday, 2/25.  - Continue plan per primary/Oncology teams- family meeting arranged today at 3:30pm with Dr. Spivey and patient to discuss GOC.

## 2019-02-28 NOTE — PROGRESS NOTE ADULT - PROBLEM SELECTOR PLAN 1
Likely 2/2 UTI/Cystitis c/b Enterovaginal Fistula.   Denies vaginal pain now, occurs intermittently at 2-3/10.    - Pt received 1 dose Oxycodone IR 10mg in the last 24 hrs.  -Continue Motrin 600mg q6h PRN mild pain.   - Continue Tylenol 650mg q6h PRN mild pain.   - Continue Oxycodone IR 5mg q6h PRN moderate pain.   - Continue Oxycodone IR 10mg q6h PRN severe pain.  - Reports nausea with one episode of vomiting (small quantity this AM)- educated patient to request pain medication as all medications available as needed.   - Ensure bowel regimen as needed. Consider Senna if reduced output in Colostomy bag- currently brown/watery stools. Likely 2/2 UTI/Cystitis c/b Enterovaginal Fistula.   Denies vaginal pain now, occurs intermittently at 2-3/10.    - Received 2 doses of Tylenol in the last 24 hours.   - Did not receive any rescue doses of Oxycodone.   -Continue Motrin 600mg q6h PRN mild pain.   - Continue Tylenol 650mg q6h PRN mild pain.   - Continue Oxycodone IR 5mg q6h PRN moderate pain.   - Continue Oxycodone IR 10mg q6h PRN severe pain.  - Educated patient to request pain medication as all medications PRN  - Ensure bowel regimen as needed. Consider Senna if reduced output in Colostomy bag- currently brown/watery stools.

## 2019-02-28 NOTE — PROGRESS NOTE ADULT - SUBJECTIVE AND OBJECTIVE BOX
Pt seen and examined at bedside. Pt states she is "tired of being stuck by needles." She has not ambulated today and states she feels weak. She denies any stool from vagina today.  Pt denies fever, chills, chest pain, SOB, nausea, vomiting, lightheadedness, or dizziness.      T(F): 97.8 (19 @ 12:37), Max: 98.5 (19 @ 14:51)  HR: 68 (19 @ 12:37) (67 - 77)  BP: 128/85 (19 @ 12:37) (127/85 - 145/87)  RR: 16 (19 @ 12:37) (16 - 17)  SpO2: 96% (19 @ 12:37) (94% - 97%)  Wt(kg): --  I&O's Summary    2019 07:  -  2019 07:00  --------------------------------------------------------  IN: 4457.1 mL / OUT: 3315 mL / NET: 1142.1 mL    2019 07:  -  2019 14:42  --------------------------------------------------------  IN: 183 mL / OUT: 500 mL / NET: -317 mL    MEDICATIONS  (STANDING):  cefTRIAXone   IVPB 1 Gram(s) IV Intermittent every 24 hours  chlorhexidine 2% Cloths 1 Application(s) Topical <User Schedule>  dextrose 5%. 1000 milliLiter(s) (50 mL/Hr) IV Continuous <Continuous>  dextrose 50% Injectable 12.5 Gram(s) IV Push once  dextrose 50% Injectable 25 Gram(s) IV Push once  dextrose 50% Injectable 25 Gram(s) IV Push once  enoxaparin Injectable 40 milliGRAM(s) SubCutaneous daily  escitalopram 10 milliGRAM(s) Oral at bedtime  fat emulsion (Plant Based) 20% IVPB 50 Gram(s) IV Intermittent once  insulin lispro (HumaLOG) corrective regimen sliding scale   SubCutaneous Before meals and at bedtime  lactated ringers. 1000 milliLiter(s) (100 mL/Hr) IV Continuous <Continuous>  magnesium sulfate  IVPB 2 Gram(s) IV Intermittent once  nystatin    Suspension 944401 Unit(s) Oral four times a day  pantoprazole  Injectable 40 milliGRAM(s) IV Push every 24 hours  Parenteral Nutrition - Adult 1 Each (83 mL/Hr) TPN Continuous <Continuous>  Parenteral Nutrition - Adult 1 Each (83 mL/Hr) TPN Continuous <Continuous>    MEDICATIONS  (PRN):  acetaminophen   Tablet .. 650 milliGRAM(s) Oral every 6 hours PRN Temp greater or equal to 38C (100.4F), Mild Pain (1 - 3)  dextrose 40% Gel 15 Gram(s) Oral once PRN Blood Glucose LESS THAN 70 milliGRAM(s)/deciliter  glucagon  Injectable 1 milliGRAM(s) IntraMuscular once PRN Glucose LESS THAN 70 milligrams/deciliter  ibuprofen  Tablet. 600 milliGRAM(s) Oral every 6 hours PRN mild pain (1-3) not relieved with Acetaminophen  oxyCODONE    IR 5 milliGRAM(s) Oral every 6 hours PRN Moderate Pain (4 - 6)  oxyCODONE    IR 10 milliGRAM(s) Oral every 6 hours PRN Severe Pain (7 - 10)  sodium chloride 0.9% lock flush 10 milliLiter(s) IV Push every 1 hour PRN Pre/post blood products, medications, blood draw, and to maintain line patency    Physical Exam:  Constitutional: NAD  Pulmonary: clear to auscultation bilaterally   Cardiovascular: Regular rate and rhythm   Abdomen: Soft, nontender, nondistended, no guarding, no rebound. Ostomy bag intact with brown stool in bag   Extremities: no lower extremity edema or calve tenderness. SCDs in place                     Right PICC line in place- clean, dry, intact     LABS:                        7.8    6.47  )-----------( 393      ( 2019 11:58 )             24.7     02-28    138  |  99  |  14  ----------------------------<  171<H>  3.8   |  30  |  0.51    Ca    9.0      2019 11:57  Phos  3.7       Mg     1.8         TPro  6.2  /  Alb  3.3  /  TBili  <0.2  /  DBili  x   /  AST  11  /  ALT  13  /  AlkPhos  74      PT/INR - ( 2019 11:01 )   PT: 12.6 sec;   INR: 1.11       PTT - ( 2019 11:01 )  PTT:29.2 sec  Urinalysis Basic - ( 2019 19:51 )    Color: Yellow / Appearance: Clear / S.015 / pH: x  Gluc: x / Ketone: Trace mg/dL  / Bili: Negative / Urobili: 0.2 E.U./dL   Blood: x / Protein: 30 mg/dL / Nitrite: POSITIVE   Leuk Esterase: Moderate / RBC: < 5 /HPF / WBC Many /HPF   Sq Epi: x / Non Sq Epi: Moderate /HPF / Bacteria: Present /HPF

## 2019-02-28 NOTE — CONSULT NOTE ADULT - SUBJECTIVE AND OBJECTIVE BOX
58yo G0 with PMHx significant for breast cancer, diabetes mellitus, endometrial cancer stage 3, s/p exploratory laparotomy, enterolysis, SHAMIR, BSO, pelvic lymphadenectomy, low anterior resection mobilization of splenic flexure, end colostomy on 7/30/18 now with rectovaginal fistula.  She was admitted to [facility] one week ago for treatment of UTI, and now transferred to [facility] for further care. 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 Gyn-onc and surgical teams are coordinating to evaluate role of corrective surgery, in the meantime plan for parenteral nutrition to improve wound healing and decrease output from fistula   Pt denies fever, chills, chest pain, SOB, abdominal pain, nausea, vomiting, vaginal bleeding.          REVIEW OF SYSTEMS:  Constitutional: No fever, weight loss or fatigue  ENMT:  No difficulty hearing, tinnitus, vertigo; No sinus or throat pain  Respiratory: No cough, wheezing, chills or hemoptysis  Cardiovascular: No chest pain, palpitations, dizziness or leg swelling  Gastrointestinal: No abdominal or epigastric pain. No nausea, vomiting or hematemesis; No diarrhea or constipation. No melena or hematochezia.  Skin: No itching, burning, rashes or lesions   Musculoskeletal: No joint pain or swelling; No muscle, back or extremity pain    PAST MEDICAL & SURGICAL HISTORY:      FAMILY HISTORY:      SOCIAL HISTORY:  Smoking Status: [ ] Current, [ ] Former, [ ] Never  Pack Years:    MEDICATIONS:  MEDICATIONS  (STANDING):  cefTRIAXone   IVPB 1 Gram(s) IV Intermittent every 24 hours  chlorhexidine 2% Cloths 1 Application(s) Topical <User Schedule>  dextrose 5%. 1000 milliLiter(s) (50 mL/Hr) IV Continuous <Continuous>  dextrose 50% Injectable 12.5 Gram(s) IV Push once  dextrose 50% Injectable 25 Gram(s) IV Push once  dextrose 50% Injectable 25 Gram(s) IV Push once  enoxaparin Injectable 40 milliGRAM(s) SubCutaneous daily  escitalopram 10 milliGRAM(s) Oral at bedtime  fat emulsion (Plant Based) 20% IVPB 50 Gram(s) IV Intermittent once  insulin lispro (HumaLOG) corrective regimen sliding scale   SubCutaneous Before meals and at bedtime  lactated ringers. 1000 milliLiter(s) (100 mL/Hr) IV Continuous <Continuous>  magnesium sulfate  IVPB 2 Gram(s) IV Intermittent once  nystatin    Suspension 051217 Unit(s) Oral four times a day  pantoprazole  Injectable 40 milliGRAM(s) IV Push every 24 hours  Parenteral Nutrition - Adult 1 Each (83 mL/Hr) TPN Continuous <Continuous>  Parenteral Nutrition - Adult 1 Each (83 mL/Hr) TPN Continuous <Continuous>    MEDICATIONS  (PRN):  acetaminophen   Tablet .. 650 milliGRAM(s) Oral every 6 hours PRN Temp greater or equal to 38C (100.4F), Mild Pain (1 - 3)  dextrose 40% Gel 15 Gram(s) Oral once PRN Blood Glucose LESS THAN 70 milliGRAM(s)/deciliter  glucagon  Injectable 1 milliGRAM(s) IntraMuscular once PRN Glucose LESS THAN 70 milligrams/deciliter  ibuprofen  Tablet. 600 milliGRAM(s) Oral every 6 hours PRN mild pain (1-3) not relieved with Acetaminophen  oxyCODONE    IR 5 milliGRAM(s) Oral every 6 hours PRN Moderate Pain (4 - 6)  oxyCODONE    IR 10 milliGRAM(s) Oral every 6 hours PRN Severe Pain (7 - 10)  sodium chloride 0.9% lock flush 10 milliLiter(s) IV Push every 1 hour PRN Pre/post blood products, medications, blood draw, and to maintain line patency      Allergies    Allergy Status Unknown    Intolerances        Vital Signs Last 24 Hrs  T(C): 36.6 (28 Feb 2019 12:37), Max: 36.6 (28 Feb 2019 12:37)  T(F): 97.8 (28 Feb 2019 12:37), Max: 97.8 (28 Feb 2019 12:37)  HR: 68 (28 Feb 2019 12:37) (67 - 75)  BP: 128/85 (28 Feb 2019 12:37) (127/85 - 140/89)  BP(mean): --  RR: 16 (28 Feb 2019 12:37) (16 - 17)  SpO2: 96% (28 Feb 2019 12:37) (96% - 97%)    02-27 @ 07:01  -  02-28 @ 07:00  --------------------------------------------------------  IN: 4457.1 mL / OUT: 3315 mL / NET: 1142.1 mL    02-28 @ 07:01  - 02-28 @ 16:47  --------------------------------------------------------  IN: 183 mL / OUT: 500 mL / NET: -317 mL          PHYSICAL EXAM:    General: healthy appearing   HEENT: MMM, conjunctiva and sclera clear  Gastrointestinal: Soft, non-tender non-distended; Normal bowel sounds; No rebound or guarding  ostomy bag with brown stool  Extremities: Normal range of motion, No clubbing, cyanosis or edema  Neurological: Alert and oriented x3  Skin: Warm and dry. No obvious rash      LABS:                        7.8    6.47  )-----------( 393      ( 28 Feb 2019 11:58 )             24.7     02-28    138  |  99  |  14  ----------------------------<  171<H>  3.8   |  30  |  0.51    Ca    9.0      28 Feb 2019 11:57  Phos  3.7     02-28  Mg     1.8     02-28    TPro  6.2  /  Alb  3.3  /  TBili  <0.2  /  DBili  x   /  AST  11  /  ALT  13  /  AlkPhos  74  02-28        RADIOLOGY & ADDITIONAL STUDIES:

## 2019-02-28 NOTE — CHART NOTE - NSCHARTNOTEFT_GEN_A_CORE
Admitting Diagnosis:   Patient is a 59y old  Female who presents with a chief complaint of Enterovaginal fistula. (2019 14:45)      PAST MEDICAL & SURGICAL HISTORY:      Current Nutrition Order: NPO  TPN via PICC: 150g Dex, 60g AA, 50g 20% lipids (1250kcal, 1.25g pro/kg IBW, GIR 1.69)  *see TPN recs below to more adequately meet needs    PO Intake: Good (%) [   ]  Fair (50-75%) [   ] Poor (<25%) [   ] N/A    GI Issues:   LLQ colostomy  Formed brown stool  Denies N/V     Pain:  No pain reported     Skin Integrity:  IAD at perirenal area  Colostomy in place    Labs:       138  |  99  |  14  ----------------------------<  171<H>  3.8   |  30  |  0.51    Ca    9.0      2019 11:57  Phos  3.7       Mg     1.8         TPro  6.2  /  Alb  3.3  /  TBili  <0.2  /  DBili  x   /  AST  11  /  ALT  13  /  AlkPhos  74      CAPILLARY BLOOD GLUCOSE      POCT Blood Glucose.: 163 mg/dL (2019 11:35)  POCT Blood Glucose.: 216 mg/dL (2019 07:22)  POCT Blood Glucose.: 203 mg/dL (2019 21:06)  POCT Blood Glucose.: 144 mg/dL (2019 16:27)      Medications:  MEDICATIONS  (STANDING):  cefTRIAXone   IVPB 1 Gram(s) IV Intermittent every 24 hours  chlorhexidine 2% Cloths 1 Application(s) Topical <User Schedule>  dextrose 5%. 1000 milliLiter(s) (50 mL/Hr) IV Continuous <Continuous>  dextrose 50% Injectable 12.5 Gram(s) IV Push once  dextrose 50% Injectable 25 Gram(s) IV Push once  dextrose 50% Injectable 25 Gram(s) IV Push once  enoxaparin Injectable 40 milliGRAM(s) SubCutaneous daily  escitalopram 10 milliGRAM(s) Oral at bedtime  fat emulsion (Plant Based) 20% IVPB 50 Gram(s) IV Intermittent once  insulin lispro (HumaLOG) corrective regimen sliding scale   SubCutaneous Before meals and at bedtime  lactated ringers. 1000 milliLiter(s) (100 mL/Hr) IV Continuous <Continuous>  magnesium sulfate  IVPB 2 Gram(s) IV Intermittent once  nystatin    Suspension 992988 Unit(s) Oral four times a day  pantoprazole  Injectable 40 milliGRAM(s) IV Push every 24 hours  Parenteral Nutrition - Adult 1 Each (83 mL/Hr) TPN Continuous <Continuous>  Parenteral Nutrition - Adult 1 Each (83 mL/Hr) TPN Continuous <Continuous>    MEDICATIONS  (PRN):  acetaminophen   Tablet .. 650 milliGRAM(s) Oral every 6 hours PRN Temp greater or equal to 38C (100.4F), Mild Pain (1 - 3)  dextrose 40% Gel 15 Gram(s) Oral once PRN Blood Glucose LESS THAN 70 milliGRAM(s)/deciliter  glucagon  Injectable 1 milliGRAM(s) IntraMuscular once PRN Glucose LESS THAN 70 milligrams/deciliter  ibuprofen  Tablet. 600 milliGRAM(s) Oral every 6 hours PRN mild pain (1-3) not relieved with Acetaminophen  oxyCODONE    IR 5 milliGRAM(s) Oral every 6 hours PRN Moderate Pain (4 - 6)  oxyCODONE    IR 10 milliGRAM(s) Oral every 6 hours PRN Severe Pain (7 - 10)  sodium chloride 0.9% lock flush 10 milliLiter(s) IV Push every 1 hour PRN Pre/post blood products, medications, blood draw, and to maintain line patency      Weight: 136lbs  Height: 5'1", IBW 105lbs+/-10%, %%, BMI 25.7  Daily     Weight Change:   Denies any recent wt changes. Good appetite PTA.  Please trend weights while on TPN     Estimated energy needs:   IBW used for calculations as pt >120% of IBW   Nutrient needs based on [facility] standards of care for maintenance in adults.   Needs adjusted 2/2 catabolic illness, chemo treatments. Fluids adjusted 2/2 colostomy.  1428-1666kcal/day (30-35kcal/kg)  48-57g pro/day (1-1.2g/kg)  1428-1666ml fluid/day (30-35ml/kg)    Subjective:   58yo F with stage IV endometrial adenocarcinoma s/p staging surgery '18 and 1 round of chemotherapy 3 wks ago. Was planned to complete 2nd cycle of chemo today . Admitted for management of symptomatic rectovaginal and enterovaginal fistulas. Per surgery, no surgical intervention at this time. Ordered for octreotide. PPN ordered for nutrition support (), PICC line placed ()- pt planned to receive TPN tomorrow (3/1). POCT B, 216,203, 144mg/dL; lytes WNL; no TG labs. Pt seen resting in bed. Remains NPO. Denies N/V. No stool leakage via vagina noted this am. Pt understanding of current NPO status with advancement pending medical course. Please see TPN recs below to more adequately meet needs. RD to follow.    Previous Nutrition Diagnosis:  increased nutrient needs RT increased demand for kcal/pro AEB catabolic illness, s/p chemo treatment and possible fistula losses.   Active [ x  ]  Resolved [   ]    If resolved, new PES:     Goal:  Pt to consistently meet % of estimated needs via most appropriate route    Recommendations:  1) Recommend 230g Dex, 60g AA, 50g 20% lipids 4x/week (1522kcal, 1.25g pro/kg IBW, GIR 2.5g). Start at 150g Dex on Day 1 and advance to goal of 230g Dex on Day 3.   Recommend checking TG before starting TPN and then check TG weekly. Check Mg, Phos, K daily and POC BG Q6hrs. Trend daily weights. Fluids and lytes per MD discretion.   2) When PO is deemed medically feasible recommend advancing to Clear Liquid diet w/ EnsureClears TID (720kcal, 24g pro)  *recs d/w GYN team    Education:   pt understanding of meeting needs via TPN for time being.     Risk Level: High [ x  ] Moderate [   ] Low [   ]

## 2019-02-28 NOTE — PROGRESS NOTE ADULT - SUBJECTIVE AND OBJECTIVE BOX
Pt evaluated at bedside, reports feeling "okay". She denies fever/chills, HA, dizziness, SOB, CP, palpitations, n/v, abdominal pain. She states she did not get out of bed w/ PT yesterday.    T(C): 36.3 (02-28-19 @ 06:20), Max: 36.3 (02-27-19 @ 20:35)  HR: 69 (02-28-19 @ 06:20) (69 - 75)  BP: 127/85 (02-28-19 @ 06:20) (127/85 - 140/89)  RR: 16 (02-28-19 @ 06:20) (16 - 16)  SpO2: 96% (02-28-19 @ 06:20) (96% - 96%)  GA: NAD, A+OX3  Abd: +BS, soft, NTND, no rebound or guarding, LLQ ostomy w/ green stool and gas  : nystatin powder on perineum, no erythema, no stool noted on chux  Extrem: no calf tenderness    02-27 @ 07:01  -  02-28 @ 06:23  --------------------------------------------------------  IN: 4457.1 mL / OUT: 3300 mL / NET: 1157.1 mL                        8.1    9.59  )-----------( 458      ( 27 Feb 2019 11:01 )             26.0     02-27    135  |  96  |  11  ----------------------------<  318<H>  3.6   |  30  |  0.48<L>    Ca    8.4      27 Feb 2019 11:01  Phos  3.2     02-27  Mg     1.8     02-27    TPro  5.8<L>  /  Alb  3.1<L>  /  TBili  <0.2  /  DBili  <0.2  /  AST  15  /  ALT  16  /  AlkPhos  81  02-27    MEDICATIONS  (STANDING):  chlorhexidine 2% Cloths 1 Application(s) Topical <User Schedule>  dextrose 5%. 1000 milliLiter(s) (50 mL/Hr) IV Continuous <Continuous>  dextrose 50% Injectable 12.5 Gram(s) IV Push once  dextrose 50% Injectable 25 Gram(s) IV Push once  dextrose 50% Injectable 25 Gram(s) IV Push once  enoxaparin Injectable 40 milliGRAM(s) SubCutaneous daily  escitalopram 10 milliGRAM(s) Oral at bedtime  insulin lispro (HumaLOG) corrective regimen sliding scale   SubCutaneous Before meals and at bedtime  lactated ringers. 1000 milliLiter(s) (100 mL/Hr) IV Continuous <Continuous>  nystatin    Suspension [id number] Unit(s) Oral four times a day  pantoprazole  Injectable 40 milliGRAM(s) IV Push every 24 hours  Parenteral Nutrition - Adult 1 Each (83 mL/Hr) TPN Continuous <Continuous>    MEDICATIONS  (PRN):  acetaminophen   Tablet .. 650 milliGRAM(s) Oral every 6 hours PRN Temp greater or equal to 38C (100.4F), Mild Pain (1 - 3)  dextrose 40% Gel 15 Gram(s) Oral once PRN Blood Glucose LESS THAN 70 milliGRAM(s)/deciliter  glucagon  Injectable 1 milliGRAM(s) IntraMuscular once PRN Glucose LESS THAN 70 milligrams/deciliter  ibuprofen  Tablet. 600 milliGRAM(s) Oral every 6 hours PRN mild pain (1-3) not relieved with Acetaminophen  oxyCODONE    IR 5 milliGRAM(s) Oral every 6 hours PRN Moderate Pain (4 - 6)  oxyCODONE    IR 10 milliGRAM(s) Oral every 6 hours PRN Severe Pain (7 - 10)  sodium chloride 0.9% lock flush 10 milliLiter(s) IV Push every 1 hour PRN Pre/post blood products, medications, blood draw, and to maintain line patency

## 2019-02-28 NOTE — PROGRESS NOTE ADULT - PROBLEM SELECTOR PLAN 3
With one episode of vomitus (small quantity, NB/NB)  Currently improved, likely 2/2 opioids.   Patient educated this is expected initially with opioids medications, symptom should gradually continue to improve in next few days.   - Consider ant-emetic as needed for nausea/vomiting if symptoms recur/remain. No nausea/vomiting today.   Improved, likely 2/2 opioids.   Patient educated this is expected initially with opioids medications, symptom should gradually continue to improve in next few days.   - Consider ant-emetic as needed for nausea/vomiting if symptoms recur/remain.

## 2019-02-28 NOTE — PROGRESS NOTE ADULT - SUBJECTIVE AND OBJECTIVE BOX
JOHN BAKER          MRN-[id number]            (1960)      HPI:  A 58 y/o G0 with PMH significant for Breast Cancer, Diabetes Mellitus, Endometrial Cancer stage 3, s/p exploratory laparotomy, enterolysis, SHAMIR, BSO, pelvic lymphadenectomy, low anterior resection mobilization of splenic flexure, end colostomy on 18 now with rectovaginal fistula. She was admitted to [facility] one week ago for treatment of UTI, and now transferred to [facility] for further care. MRI showed enterovaginal and rectovaginal fistula. Surgery team following patient- no surgical intervention offered. Per PT evaluation patient to be discharged to [facility]. Palliative consulted to evaluated patient for symptoms- pain.     Cancer History: G0, endometrial cancer dx in 2018; h/o breast cancer in  s/p chemo and radiation with left breast lumpectomy She has undergone one round of chemotherapy approximately 3 weeks ago; to complete second cycle of chemotherapy this Monday, .     Today: Patient seen and evaluated at bedside. Remains alert, oriented x3, communicative. Denies any pain currently- received 2 doses of Tylenol in the last 24 hours with improvement in pain. Reports continued itching which has improved since admission. No further complaints at this time. States that there is a family meeting arranged today with Dr. Spivey.   No overnight events.     ROS:  Decreased pain, decreased vaginal itching                     Dyspnea (Alfredo 0-10):   0   -                   N/V (Y/N):             No                 Secretions (Y/N) :        No         Agitation(Y/N):  No  Pain (Y/N):     Yes ,mild  Vaginal Pain  -Provocation/Palliation: Increased pain with voiding, relieved with  1 dose of Tylenol over past 24 hrs   -Quality/Quantity: Sharp, Burning   -Radiating: Nonradiating   -Severity: 2-3/10 presently   -Timing/Frequency: Intermittently occurring   -Impact on ADLs: loss of autonomy     Allergies    Allergy Status Unknown    Intolerances    Opiate Naive (Y/N): Y  -iStop reviewed (Y/N): Y on initial consultation (Ref#:  [id number] )  Alprazolam 0.25m, Lorazepam 0.5mg     MEDICATIONS: REVIEWED  MEDICATIONS  (STANDING):  cefTRIAXone   IVPB 1 Gram(s) IV Intermittent every 24 hours  chlorhexidine 2% Cloths 1 Application(s) Topical <User Schedule>  dextrose 5%. 1000 milliLiter(s) (50 mL/Hr) IV Continuous <Continuous>  dextrose 50% Injectable 12.5 Gram(s) IV Push once  dextrose 50% Injectable 25 Gram(s) IV Push once  dextrose 50% Injectable 25 Gram(s) IV Push once  enoxaparin Injectable 40 milliGRAM(s) SubCutaneous daily  escitalopram 10 milliGRAM(s) Oral at bedtime  fat emulsion (Plant Based) 20% IVPB 50 Gram(s) IV Intermittent once  insulin lispro (HumaLOG) corrective regimen sliding scale   SubCutaneous Before meals and at bedtime  lactated ringers. 1000 milliLiter(s) (100 mL/Hr) IV Continuous <Continuous>  magnesium sulfate  IVPB 2 Gram(s) IV Intermittent once  nystatin    Suspension [id number] Unit(s) Oral four times a day  pantoprazole  Injectable 40 milliGRAM(s) IV Push every 24 hours  Parenteral Nutrition - Adult 1 Each (83 mL/Hr) TPN Continuous <Continuous>  Parenteral Nutrition - Adult 1 Each (83 mL/Hr) TPN Continuous <Continuous>    MEDICATIONS  (PRN):  acetaminophen   Tablet .. 650 milliGRAM(s) Oral every 6 hours PRN Temp greater or equal to 38C (100.4F), Mild Pain (1 - 3)  dextrose 40% Gel 15 Gram(s) Oral once PRN Blood Glucose LESS THAN 70 milliGRAM(s)/deciliter  glucagon  Injectable 1 milliGRAM(s) IntraMuscular once PRN Glucose LESS THAN 70 milligrams/deciliter  ibuprofen  Tablet. 600 milliGRAM(s) Oral every 6 hours PRN mild pain (1-3) not relieved with Acetaminophen  oxyCODONE    IR 5 milliGRAM(s) Oral every 6 hours PRN Moderate Pain (4 - 6)  oxyCODONE    IR 10 milliGRAM(s) Oral every 6 hours PRN Severe Pain (7 - 10)  sodium chloride 0.9% lock flush 10 milliLiter(s) IV Push every 1 hour PRN Pre/post blood products, medications, blood draw, and to maintain line patency    Lab Results: reviewed                         8.1    9.59  )-----------( 458      ( 2019 11:01 )             26.0     02-27    135  |  96  |  11  ----------------------------<  318<H>  3.6   |  30  |  0.48<L>    Ca    8.4      2019 11:01  Phos  3.2       Mg     1.8         TPro  5.8<L>  /  Alb  3.1<L>  /  TBili  <0.2  /  DBili  <0.2  /  AST  15  /  ALT  16  /  AlkPhos  81      PT/INR - ( 2019 11:01 )   PT: 12.6 sec;   INR: 1.11          PTT - ( 2019 11:01 )  PTT:29.2 sec  Urinalysis Basic - ( 2019 19:51 )    Color: Yellow / Appearance: Clear / S.015 / pH: x  Gluc: x / Ketone: Trace mg/dL  / Bili: Negative / Urobili: 0.2 E.U./dL   Blood: x / Protein: 30 mg/dL / Nitrite: POSITIVE   Leuk Esterase: Moderate / RBC: < 5 /HPF / WBC Many /HPF   Sq Epi: x / Non Sq Epi: Moderate /HPF / Bacteria: Present /HPF    Imaging:  None new to review     PEx:  T(C): 36.6 (19 @ 12:37), Max: 36.9 (19 @ 14:51)  HR: 68 (19 @ 12:37) (67 - 77)  BP: 128/85 (19 @ 12:37) (127/85 - 145/87)  RR: 16 (19 @ 12:37) (16 - 17)  SpO2: 96% (19 @ 12:37) (94% - 97%)  Wt(kg): --    General: Alert, oriented x3, communicative, currently at her baseline, appears in no apparent distress  HEENT:  Atraumatic, normocephalic , + white film localized to  tongue  Neck: Neck supple   CVS: S1, S2 + regular rate and rhythm   Resp: Bilateral air entry, no wheezing, no rhonchi   GI: Abdomen soft, nontender, nondistended, bowel sounds +, Colostomy + stoma pink, + watery, brown stools less quantity  :  [place] in place   Musc: Able to move all extremities   Neuro: No focal deficits t  Psych:  Calm, and cooperative, no agitation    Skin: Intact, Normal, Pallor +     Lymph: No LAD  Preadmit Karnofsky:  60%           Current Karnofsky:  50%  Cachexia (Y/N): No  BMI: 25.7    Advanced Directives:     Full Code    Decision maker: At this time, patient is currently capacity make all health care related decisions for herself.  Legal surrogate: Patient has officially designated her sister, Esha Hyatt as her primary Health Care Proxy, and Ashlyn Baker as her secondary HCP. Documentation filled and placed in chart. Contact #[phone], #[phone]  	      AGENCY CHOICE DISCUSSED:     [facility]  [facility]     [facility]  [facility]     OTHER:          REFERRALS	        Palliative Med        Unit SW/Case Mgmt       Music Therapy        Nutrition       PT/OT

## 2019-02-28 NOTE — PROGRESS NOTE ADULT - ASSESSMENT
58yo HD7 with stage IV endometrial adenocarcinoma s/p staging surgery '18 and 1 round of chemotherapy 3 wks prior admitted for management of symptomatic rectovaginal and enterovaginal fistulas. Pt is hemodynamically stable, no stool output from vagina today.      1. PAIN - Motrin, Tylenol, Oxycodone prn   2. Cardio: vitals per routine   3. Pulm: no issues   4. FEN/GI - On PPN, TPN to start later this evening. NPO, IVH, replete electrolytes PRN. Continue daily Octreotide. Appreciate nutrition recs  5.  - joseph in place, no leakage of stool today, general surgery following for management of rectovaginal/enterovaginal fistulas- no surgery at this time. Recs appreciated  6. Endocrine- ISS while patient remains NPO, holding home metformin at this time  7. ID: Pt remains afebrile. Urine culture: gram negative rods- follow up final culture and sensitivities   IV Ceftriaxone 1g daily started (2/28-)   Oral thrush- Nystatin oral wash- Pt understands now that she needs to wash then spit solution out  8. VTE prophylaxis - SCDs, ambulate as tolerated. Lovenox 40qd  9. PT recommends [facility] for Pt but Pt is refusing   10. Daily labs   12. Palliative following- Family discussion to take place today w/sister, Dr. Arias and Dr. Spivey regarding goals of care

## 2019-03-01 DIAGNOSIS — N39.0 URINARY TRACT INFECTION, SITE NOT SPECIFIED: ICD-10-CM

## 2019-03-01 LAB
-  AMIKACIN: SIGNIFICANT CHANGE UP
-  AMPICILLIN/SULBACTAM: SIGNIFICANT CHANGE UP
-  AMPICILLIN: SIGNIFICANT CHANGE UP
-  CEFAZOLIN: SIGNIFICANT CHANGE UP
-  CEFTRIAXONE: SIGNIFICANT CHANGE UP
-  CIPROFLOXACIN: SIGNIFICANT CHANGE UP
-  GENTAMICIN: SIGNIFICANT CHANGE UP
-  NITROFURANTOIN: SIGNIFICANT CHANGE UP
-  PIPERACILLIN/TAZOBACTAM: SIGNIFICANT CHANGE UP
-  TOBRAMYCIN: SIGNIFICANT CHANGE UP
-  TRIMETHOPRIM/SULFAMETHOXAZOLE: SIGNIFICANT CHANGE UP
GLUCOSE BLDC GLUCOMTR-MCNC: 167 MG/DL — HIGH (ref 70–99)
GLUCOSE BLDC GLUCOMTR-MCNC: 175 MG/DL — HIGH (ref 70–99)
GLUCOSE BLDC GLUCOMTR-MCNC: 184 MG/DL — HIGH (ref 70–99)
GLUCOSE BLDC GLUCOMTR-MCNC: 212 MG/DL — HIGH (ref 70–99)
METHOD TYPE: SIGNIFICANT CHANGE UP

## 2019-03-01 PROCEDURE — 99233 SBSQ HOSP IP/OBS HIGH 50: CPT

## 2019-03-01 PROCEDURE — 99232 SBSQ HOSP IP/OBS MODERATE 35: CPT

## 2019-03-01 RX ORDER — ELECTROLYTE SOLUTION,INJ
1 VIAL (ML) INTRAVENOUS
Qty: 0 | Refills: 0 | Status: DISCONTINUED | OUTPATIENT
Start: 2019-03-01 | End: 2019-03-01

## 2019-03-01 RX ADMIN — CHLORHEXIDINE GLUCONATE 1 APPLICATION(S): 213 SOLUTION TOPICAL at 05:50

## 2019-03-01 RX ADMIN — Medication 500000 UNIT(S): at 01:25

## 2019-03-01 RX ADMIN — Medication 500000 UNIT(S): at 22:19

## 2019-03-01 RX ADMIN — Medication 2: at 12:09

## 2019-03-01 RX ADMIN — ESCITALOPRAM OXALATE 10 MILLIGRAM(S): 10 TABLET, FILM COATED ORAL at 22:18

## 2019-03-01 RX ADMIN — PANTOPRAZOLE SODIUM 40 MILLIGRAM(S): 20 TABLET, DELAYED RELEASE ORAL at 07:01

## 2019-03-01 RX ADMIN — Medication 500000 UNIT(S): at 17:50

## 2019-03-01 RX ADMIN — Medication 260 MILLIGRAM(S): at 18:31

## 2019-03-01 RX ADMIN — Medication 2: at 17:50

## 2019-03-01 RX ADMIN — ENOXAPARIN SODIUM 40 MILLIGRAM(S): 100 INJECTION SUBCUTANEOUS at 12:08

## 2019-03-01 RX ADMIN — SODIUM CHLORIDE 100 MILLILITER(S): 9 INJECTION, SOLUTION INTRAVENOUS at 22:18

## 2019-03-01 RX ADMIN — Medication 2: at 22:18

## 2019-03-01 RX ADMIN — Medication 500000 UNIT(S): at 05:50

## 2019-03-01 RX ADMIN — Medication 500000 UNIT(S): at 12:08

## 2019-03-01 RX ADMIN — Medication 4: at 07:58

## 2019-03-01 RX ADMIN — Medication 1 EACH: at 17:50

## 2019-03-01 NOTE — PROGRESS NOTE ADULT - PROBLEM SELECTOR PLAN 8
Support provided to patient and family. Patient to have access to supportive services during rest of hospital stay as the patient/family deemed necessary ie. Chaplaincy, Massage therapy, Music therapy, Patient and family supportive services, Palliative SW, etc.    As discussed during the palliative IDT meeting and as identified during the patients PSSA screening the patient would benefit from: supportive care.

## 2019-03-01 NOTE — PROGRESS NOTE ADULT - PROBLEM SELECTOR PLAN 6
Presented with fecal-urinary incontinence- MRI findings suggestive of enterovaginal fistula.   -Seen by Surgery team with no plans for acute intervention at this time

## 2019-03-01 NOTE — PROGRESS NOTE ADULT - PROBLEM SELECTOR PLAN 3
Likely 2/2 yeast infection from immunocompromised state.   - Continue Nystatin topical powder applied to vagina.    - May consider witch hazel for further symptom relief.

## 2019-03-01 NOTE — PROGRESS NOTE ADULT - SUBJECTIVE AND OBJECTIVE BOX
Pt evaluated at bedside, reports feeling well. She denies fever/chills, HA, dizziness, SOB, CP, palpitations, n/v. She is motivated to ambulate out of bed to chair today.    T(C): 36.7 (03-01-19 @ 09:21), Max: 36.7 (03-01-19 @ 09:21)  HR: 73 (03-01-19 @ 09:21) (72 - 73)  BP: 147/90 (03-01-19 @ 09:21) (140/84 - 147/90)  RR: 17 (03-01-19 @ 09:21) (17 - 17)  SpO2: 97% (03-01-19 @ 09:21) (97% - 97%)  GA: NAD, A+OX3  Abd: +BS, soft, NTND, no rebound or guarding, LLQ ostomy w/ green stool and gas  no CVAT  : nystatin powder on perineum, no erythema, small amount of green stool noted per vagina on chux  Extrem: no calf tenderness    02-28 @ 07:01  -  03-01 @ 07:00  --------------------------------------------------------  IN: 1952.9 mL / OUT: 4400 mL / NET: -2447.1 mL                        7.8    6.47  )-----------( 393      ( 28 Feb 2019 11:58 )             24.7     02-28    138  |  99  |  14  ----------------------------<  171<H>  3.8   |  30  |  0.51    Ca    9.0      28 Feb 2019 11:57  Phos  3.7     02-28  Mg     1.8     02-28    TPro  6.2  /  Alb  3.3  /  TBili  <0.2  /  DBili  x   /  AST  11  /  ALT  13  /  AlkPhos  74  02-28    MEDICATIONS  (STANDING):  chlorhexidine 2% Cloths 1 Application(s) Topical <User Schedule>  dextrose 5%. 1000 milliLiter(s) (50 mL/Hr) IV Continuous <Continuous>  dextrose 50% Injectable 12.5 Gram(s) IV Push once  dextrose 50% Injectable 25 Gram(s) IV Push once  dextrose 50% Injectable 25 Gram(s) IV Push once  enoxaparin Injectable 40 milliGRAM(s) SubCutaneous daily  escitalopram 10 milliGRAM(s) Oral at bedtime  insulin lispro (HumaLOG) corrective regimen sliding scale   SubCutaneous Before meals and at bedtime  lactated ringers. 1000 milliLiter(s) (100 mL/Hr) IV Continuous <Continuous>  nystatin    Suspension 381626 Unit(s) Oral four times a day  pantoprazole  Injectable 40 milliGRAM(s) IV Push every 24 hours  Parenteral Nutrition - Adult 1 Each (83 mL/Hr) TPN Continuous <Continuous>  Parenteral Nutrition - Adult 1 Each (83 mL/Hr) TPN Continuous <Continuous>  trimethoprim / sulfamethoxazole IVPB 160 milliGRAM(s) IV Intermittent every 12 hours    MEDICATIONS  (PRN):  acetaminophen   Tablet .. 650 milliGRAM(s) Oral every 6 hours PRN Temp greater or equal to 38C (100.4F), Mild Pain (1 - 3)  dextrose 40% Gel 15 Gram(s) Oral once PRN Blood Glucose LESS THAN 70 milliGRAM(s)/deciliter  glucagon  Injectable 1 milliGRAM(s) IntraMuscular once PRN Glucose LESS THAN 70 milligrams/deciliter  ibuprofen  Tablet. 600 milliGRAM(s) Oral every 6 hours PRN mild pain (1-3) not relieved with Acetaminophen  oxyCODONE    IR 5 milliGRAM(s) Oral every 6 hours PRN Moderate Pain (4 - 6)  oxyCODONE    IR 10 milliGRAM(s) Oral every 6 hours PRN Severe Pain (7 - 10)  sodium chloride 0.9% lock flush 10 milliLiter(s) IV Push every 1 hour PRN Pre/post blood products, medications, blood draw, and to maintain line patency

## 2019-03-01 NOTE — PROGRESS NOTE ADULT - SUBJECTIVE AND OBJECTIVE BOX
Pt evaluated at bedside, reports feeling well w/o complaints. She was able to sleep overnight. She denies fever/chills, HA, dizziness, SOB, CP, palpitations, n/v, abdominal pain. She ambulated out of bed to chair w/ PT yesterday.    T(C): 36.5 (03-01-19 @ 05:41), Max: 36.7 (02-28-19 @ 20:00)  HR: 72 (03-01-19 @ 05:41) (70 - 72)  BP: 140/84 (03-01-19 @ 05:41) (132/85 - 140/84)  RR: 17 (03-01-19 @ 05:41) (16 - 17)  SpO2: 97% (03-01-19 @ 05:41) (96% - 97%)  GA: NAD, A+OX3  Abd: +BS, soft, NTND, no rebound or guarding, LLQ ostomy w/ green stool and gas  no CVAT  : nystatin powder on perineum, no erythema, no stool noted on chux  Extrem: no calf tenderness    02-28 @ 07:01  -  03-01 @ 07:00  --------------------------------------------------------  IN: 1661.4 mL / OUT: 4400 mL / NET: -2738.6 mL                        7.8    6.47  )-----------( 393      ( 28 Feb 2019 11:58 )             24.7     02-28    138  |  99  |  14  ----------------------------<  171<H>  3.8   |  30  |  0.51    Ca    9.0      28 Feb 2019 11:57  Phos  3.7     02-28  Mg     1.8     02-28    TPro  6.2  /  Alb  3.3  /  TBili  <0.2  /  DBili  x   /  AST  11  /  ALT  13  /  AlkPhos  74  02-28    MEDICATIONS  (STANDING):  cefTRIAXone   IVPB 1 Gram(s) IV Intermittent every 24 hours  chlorhexidine 2% Cloths 1 Application(s) Topical <User Schedule>  dextrose 5%. 1000 milliLiter(s) (50 mL/Hr) IV Continuous <Continuous>  dextrose 50% Injectable 12.5 Gram(s) IV Push once  dextrose 50% Injectable 25 Gram(s) IV Push once  dextrose 50% Injectable 25 Gram(s) IV Push once  enoxaparin Injectable 40 milliGRAM(s) SubCutaneous daily  escitalopram 10 milliGRAM(s) Oral at bedtime  insulin lispro (HumaLOG) corrective regimen sliding scale   SubCutaneous Before meals and at bedtime  lactated ringers. 1000 milliLiter(s) (100 mL/Hr) IV Continuous <Continuous>  nystatin    Suspension 245351 Unit(s) Oral four times a day  pantoprazole  Injectable 40 milliGRAM(s) IV Push every 24 hours  Parenteral Nutrition - Adult 1 Each (83 mL/Hr) TPN Continuous <Continuous>    MEDICATIONS  (PRN):  acetaminophen   Tablet .. 650 milliGRAM(s) Oral every 6 hours PRN Temp greater or equal to 38C (100.4F), Mild Pain (1 - 3)  dextrose 40% Gel 15 Gram(s) Oral once PRN Blood Glucose LESS THAN 70 milliGRAM(s)/deciliter  glucagon  Injectable 1 milliGRAM(s) IntraMuscular once PRN Glucose LESS THAN 70 milligrams/deciliter  ibuprofen  Tablet. 600 milliGRAM(s) Oral every 6 hours PRN mild pain (1-3) not relieved with Acetaminophen  oxyCODONE    IR 5 milliGRAM(s) Oral every 6 hours PRN Moderate Pain (4 - 6)  oxyCODONE    IR 10 milliGRAM(s) Oral every 6 hours PRN Severe Pain (7 - 10)  sodium chloride 0.9% lock flush 10 milliLiter(s) IV Push every 1 hour PRN Pre/post blood products, medications, blood draw, and to maintain line patency

## 2019-03-01 NOTE — PROGRESS NOTE ADULT - ASSESSMENT
60yo G0 w/ known stage IV endometrial adenocarcinoma s/p staging surgery '18 and 1 round of chemotherapy 2/19 admitted for management of symptomatic rectovaginal and enterovaginal fistulas, stable    1. ID: urine culture on admission negative, repeat UA 2/26 with + nitrites, repeat urine culture w/ gram negative rods, on IV ceftriaxone daily, awaiting speciation and sensitivities  2. FEN/GI - NPO, IVH, replete electrolytes PRN, octreotide daily, on TPN, s/p PICC 2/27, GI following reccs appreciated  3. PAIN - OPM PRN  4.  - joseph in place, intermittent periods of leakage of stool per vagina, none seen this morning, general surgery consulted for recommendations on management of rectovaginal/enterovaginal fistulas, reccs appreciated, no surgical intervention at this time  5. Endocrine- ISS while patient remains NPO, holding home metformin at this time  6. RESP-  satting well on room air  8. VTE prophylaxis - SCDs, ambulate as tolerated. Lovenox 40qd, PT    9. PT recommending LITO however patient does not want LITO  - family discussion w/ Dr. Spivey and Hugo 2/28 regarding goals of care, to continue current management

## 2019-03-01 NOTE — PROGRESS NOTE ADULT - SUBJECTIVE AND OBJECTIVE BOX
JOHN BAKER          MRN-[id number]            (1960)      HPI:  A 58 y/o G0 with PMH significant for Breast Cancer, Diabetes Mellitus, Endometrial Cancer stage 3, s/p exploratory laparotomy, enterolysis, SHAMIR, BSO, pelvic lymphadenectomy, low anterior resection mobilization of splenic flexure, end colostomy on 18 now with rectovaginal fistula. She was admitted to [place] one week ago for treatment of UTI, and now transferred to [facility] for further care. MRI showed enterovaginal and rectovaginal fistula. Surgery team following patient- no surgical intervention offered. Per PT evaluation patient to be discharged to [facility]. Palliative consulted to evaluated patient for symptoms- pain.     Cancer History: G0, endometrial cancer dx in 2018; h/o breast cancer in  s/p chemo and radiation with left breast lumpectomy She has undergone one round of chemotherapy approximately 3 weeks ago; to complete second cycle of chemotherapy this Monday, .     Subjectie:  Patient seen and evaluated at bedside. Remains alert, oriented x3, communicative. Denies any pain currently- received 1doses of Tylenol in the last 24 hours with improvement in pain. Reports continued itching which has improved since admission. States that she feels some "pins and needles" in her feet > R.      ROS:  Decreased pain, decreased vaginal itching                     Dyspnea (Alfredo 0-10):   0   -                   N/V (Y/N):             No                 Secretions (Y/N) :        No         Agitation(Y/N):  No  Pain (Y/N):   Not at present time, does c/o intermittent ,mild  Vaginal Pain  -Provocation/Palliation: Increased pain with voiding, relieved with  1 dose of Tylenol over past 24 hrs   -Quality/Quantity: Sharp, Burning   -Radiating: Nonradiating   -Severity: 2-3/10 presently   -Timing/Frequency: Intermittently occurring   -Impact on ADLs: loss of autonomy     Allergies    Allergy Status Unknown    Intolerances    Opiate Naive (Y/N): Y  -iStop reviewed (Y/N): Y on initial consultation (Ref#:  [id number] )  Alprazolam 0.25m, Lorazepam 0.5mg     MEDICATIONS  (STANDING):  chlorhexidine 2% Cloths 1 Application(s) Topical <User Schedule>  dextrose 5%. 1000 milliLiter(s) (50 mL/Hr) IV Continuous <Continuous>  dextrose 50% Injectable 12.5 Gram(s) IV Push once  dextrose 50% Injectable 25 Gram(s) IV Push once  dextrose 50% Injectable 25 Gram(s) IV Push once  enoxaparin Injectable 40 milliGRAM(s) SubCutaneous daily  escitalopram 10 milliGRAM(s) Oral at bedtime  insulin lispro (HumaLOG) corrective regimen sliding scale   SubCutaneous Before meals and at bedtime  lactated ringers. 1000 milliLiter(s) (100 mL/Hr) IV Continuous <Continuous>  nystatin    Suspension [id number] Unit(s) Oral four times a day  pantoprazole  Injectable 40 milliGRAM(s) IV Push every 24 hours  Parenteral Nutrition - Adult 1 Each (83 mL/Hr) TPN Continuous <Continuous>  Parenteral Nutrition - Adult 1 Each (83 mL/Hr) TPN Continuous <Continuous>  trimethoprim / sulfamethoxazole IVPB 160 milliGRAM(s) IV Intermittent every 12 hours    MEDICATIONS  (PRN):  acetaminophen   Tablet .. 650 milliGRAM(s) Oral every 6 hours PRN Temp greater or equal to 38C (100.4F), Mild Pain (1 - 3)  dextrose 40% Gel 15 Gram(s) Oral once PRN Blood Glucose LESS THAN 70 milliGRAM(s)/deciliter  glucagon  Injectable 1 milliGRAM(s) IntraMuscular once PRN Glucose LESS THAN 70 milligrams/deciliter  ibuprofen  Tablet. 600 milliGRAM(s) Oral every 6 hours PRN mild pain (1-3) not relieved with Acetaminophen  oxyCODONE    IR 5 milliGRAM(s) Oral every 6 hours PRN Moderate Pain (4 - 6)  oxyCODONE    IR 10 milliGRAM(s) Oral every 6 hours PRN Severe Pain (7 - 10)  sodium chloride 0.9% lock flush 10 milliLiter(s) IV Push every 1 hour PRN Pre/post blood products, medications, blood draw, and to maintain line patency      Lab Results: reviewed                                 7.8    6.47  )-----------( 393      ( 2019 11:58 )             24.7       138  |  99  |  14  ----------------------------<  171<H>  3.8   |  30  |  0.51    Ca    9.0      2019 11:57  Phos  3.7       Mg     1.8         TPro  6.2  /  Alb  3.3  /  TBili  <0.2  /  DBili  x   /  AST  11  /  ALT  13  /  AlkPhos  74      Color: Yellow / Appearance: Clear / S.015 / pH: x  Gluc: x / Ketone: Trace mg/dL  / Bili: Negative / Urobili: 0.2 E.U./dL   Blood: x / Protein: 30 mg/dL / Nitrite: POSITIVE   Leuk Esterase: Moderate / RBC: < 5 /HPF / WBC Many /HPF   Sq Epi: x / Non Sq Epi: Moderate /HPF / Bacteria: Present /HPF    Imaging:  None new to review     PEx:  Vital Signs Last 24 Hrs  T(C): 36.7 (01 Mar 2019 09:21), Max: 36.7 (2019 16:47)  T(F): 98.1 (01 Mar 2019 09:21), Max: 98.1 (01 Mar 2019 09:21)  HR: 73 (01 Mar 2019 09:21) (68 - 73)  BP: 147/90 (01 Mar 2019 09:21) (128/85 - 147/90)  BP(mean): --  RR: 17 (01 Mar 2019 09:21) (16 - 17)  SpO2: 97% (01 Mar 2019 09:21) (95% - 97%)      General: Alert, oriented x3, communicative, currently at her baseline, appears in no apparent distress  HEENT:  Atraumatic, normocephalic ,  decreasing white film localized to  tongue  Neck: Neck supple   CVS: S1, S2 + regular rate and rhythm   Resp: Bilateral air entry, no wheezing, no rhonchi   GI: Abdomen soft, nontender, nondistended, bowel sounds +, Colostomy + stoma pink, + watery, brown stools less quantity  :  [place] in place , large amts of light  yellow urine   Musc: Able to move all extremities   Neuro: AxO x 3,  "pins and needles" b/l feet > right side  Psych:  Calm, and cooperative, no agitation    Skin: Intact, Normal, Pallor +     Lymph: No LAD  Preadmit Karnofsky:  60%           Current Karnofsky:  50%  Cachexia (Y/N): No  BMI: 25.7    Advanced Directives:     Full Code    Decision maker: At this time, patient is currently capacity make all health care related decisions for herself.  Legal surrogate: Patient has officially designated her sister, Esha Hyatt as her primary Health Care Proxy, and Ashlyn Baker as her secondary HCP. Documentation filled and placed in chart. Contact #[phone], #[phone]  	      AGENCY CHOICE DISCUSSED:     LITO            REFERRALS	        Palliative Med        Unit SW/Case Mgmt       Music Therapy        Nutrition       PT/OT

## 2019-03-01 NOTE — PROGRESS NOTE ADULT - ASSESSMENT
60yo G0 w/ known stage IV endometrial adenocarcinoma s/p staging surgery '18 and 1 round of chemotherapy 2/19 admitted for management of symptomatic rectovaginal and enterovaginal fistulas, now with complex fistula associated bacteruria, hemodynamically stable    1. ID: urine culture on admission negative, repeat UA 2/26 with + nitrites, repeat urine culture w/ ESBL e. coli, associated due to complex fistula, previously on ceftriaxone but organism resistant, now on bactrim DS BID  2. FEN/GI - NPO, IVH, replete electrolytes PRN, octreotide daily, on TPN, s/p PICC 2/27, GI following reccs appreciated  3. PAIN - OPM PRN  4.  - joseph in place, intermittent periods of leakage of stool per vagina, general surgery consulted for recommendations on management of rectovaginal/enterovaginal fistulas, reccs appreciated, no surgical intervention at this time  5. Endocrine- ISS while patient remains NPO, holding home metformin at this time  6. RESP-  satting well on room air  8. VTE prophylaxis - SCDs, ambulate as tolerated. Lovenox 40qd, PT    9. PT recommending LITO however patient does not want LITO  - family discussion w/ Dr. Spivey and Hugo 2/28 regarding goals of care, to continue current management, radiation oncology consulted for recommendations

## 2019-03-02 LAB
-  MEROPENEM: SIGNIFICANT CHANGE UP
ALBUMIN SERPL ELPH-MCNC: 2.9 G/DL — LOW (ref 3.3–5)
ALP SERPL-CCNC: 80 U/L — SIGNIFICANT CHANGE UP (ref 40–120)
ALT FLD-CCNC: 10 U/L — SIGNIFICANT CHANGE UP (ref 10–45)
ANION GAP SERPL CALC-SCNC: 9 MMOL/L — SIGNIFICANT CHANGE UP (ref 5–17)
AST SERPL-CCNC: 12 U/L — SIGNIFICANT CHANGE UP (ref 10–40)
BILIRUB SERPL-MCNC: <0.2 MG/DL — SIGNIFICANT CHANGE UP (ref 0.2–1.2)
BUN SERPL-MCNC: 16 MG/DL — SIGNIFICANT CHANGE UP (ref 7–23)
CALCIUM SERPL-MCNC: 9 MG/DL — SIGNIFICANT CHANGE UP (ref 8.4–10.5)
CHLORIDE SERPL-SCNC: 100 MMOL/L — SIGNIFICANT CHANGE UP (ref 96–108)
CO2 SERPL-SCNC: 29 MMOL/L — SIGNIFICANT CHANGE UP (ref 22–31)
CREAT SERPL-MCNC: 0.49 MG/DL — LOW (ref 0.5–1.3)
CULTURE RESULTS: SIGNIFICANT CHANGE UP
GLUCOSE BLDC GLUCOMTR-MCNC: 154 MG/DL — HIGH (ref 70–99)
GLUCOSE BLDC GLUCOMTR-MCNC: 165 MG/DL — HIGH (ref 70–99)
GLUCOSE BLDC GLUCOMTR-MCNC: 178 MG/DL — HIGH (ref 70–99)
GLUCOSE BLDC GLUCOMTR-MCNC: 206 MG/DL — HIGH (ref 70–99)
GLUCOSE SERPL-MCNC: 184 MG/DL — HIGH (ref 70–99)
HCT VFR BLD CALC: 26 % — LOW (ref 34.5–45)
HGB BLD-MCNC: 8 G/DL — LOW (ref 11.5–15.5)
MAGNESIUM SERPL-MCNC: 1.9 MG/DL — SIGNIFICANT CHANGE UP (ref 1.6–2.6)
MCHC RBC-ENTMCNC: 28.9 PG — SIGNIFICANT CHANGE UP (ref 27–34)
MCHC RBC-ENTMCNC: 30.8 GM/DL — LOW (ref 32–36)
MCV RBC AUTO: 93.9 FL — SIGNIFICANT CHANGE UP (ref 80–100)
METHOD TYPE: SIGNIFICANT CHANGE UP
NRBC # BLD: 0 /100 WBCS — SIGNIFICANT CHANGE UP (ref 0–0)
ORGANISM # SPEC MICROSCOPIC CNT: SIGNIFICANT CHANGE UP
PHOSPHATE SERPL-MCNC: 4.3 MG/DL — SIGNIFICANT CHANGE UP (ref 2.5–4.5)
PLATELET # BLD AUTO: 366 K/UL — SIGNIFICANT CHANGE UP (ref 150–400)
POTASSIUM SERPL-MCNC: 4.6 MMOL/L — SIGNIFICANT CHANGE UP (ref 3.5–5.3)
POTASSIUM SERPL-SCNC: 4.6 MMOL/L — SIGNIFICANT CHANGE UP (ref 3.5–5.3)
PREALB SERPL-MCNC: 14 MG/DL — LOW (ref 20–40)
PROT SERPL-MCNC: 6.1 G/DL — SIGNIFICANT CHANGE UP (ref 6–8.3)
RBC # BLD: 2.77 M/UL — LOW (ref 3.8–5.2)
RBC # FLD: 16.3 % — HIGH (ref 10.3–14.5)
SODIUM SERPL-SCNC: 138 MMOL/L — SIGNIFICANT CHANGE UP (ref 135–145)
SPECIMEN SOURCE: SIGNIFICANT CHANGE UP
WBC # BLD: 6.27 K/UL — SIGNIFICANT CHANGE UP (ref 3.8–10.5)
WBC # FLD AUTO: 6.27 K/UL — SIGNIFICANT CHANGE UP (ref 3.8–10.5)

## 2019-03-02 PROCEDURE — 99232 SBSQ HOSP IP/OBS MODERATE 35: CPT

## 2019-03-02 RX ORDER — I.V. FAT EMULSION 20 G/100ML
50 EMULSION INTRAVENOUS ONCE
Qty: 0 | Refills: 0 | Status: COMPLETED | OUTPATIENT
Start: 2019-03-02 | End: 2019-03-02

## 2019-03-02 RX ORDER — ACETAMINOPHEN 500 MG
650 TABLET ORAL EVERY 6 HOURS
Qty: 0 | Refills: 0 | Status: DISCONTINUED | OUTPATIENT
Start: 2019-03-02 | End: 2019-03-25

## 2019-03-02 RX ORDER — ELECTROLYTE SOLUTION,INJ
1 VIAL (ML) INTRAVENOUS
Qty: 0 | Refills: 0 | Status: DISCONTINUED | OUTPATIENT
Start: 2019-03-02 | End: 2019-03-02

## 2019-03-02 RX ADMIN — Medication 260 MILLIGRAM(S): at 18:25

## 2019-03-02 RX ADMIN — Medication 500000 UNIT(S): at 06:55

## 2019-03-02 RX ADMIN — CHLORHEXIDINE GLUCONATE 1 APPLICATION(S): 213 SOLUTION TOPICAL at 06:52

## 2019-03-02 RX ADMIN — Medication 500000 UNIT(S): at 11:40

## 2019-03-02 RX ADMIN — Medication 500000 UNIT(S): at 18:25

## 2019-03-02 RX ADMIN — SODIUM CHLORIDE 100 MILLILITER(S): 9 INJECTION, SOLUTION INTRAVENOUS at 22:08

## 2019-03-02 RX ADMIN — PANTOPRAZOLE SODIUM 40 MILLIGRAM(S): 20 TABLET, DELAYED RELEASE ORAL at 06:53

## 2019-03-02 RX ADMIN — ENOXAPARIN SODIUM 40 MILLIGRAM(S): 100 INJECTION SUBCUTANEOUS at 11:40

## 2019-03-02 RX ADMIN — I.V. FAT EMULSION 20.83 GRAM(S): 20 EMULSION INTRAVENOUS at 22:13

## 2019-03-02 RX ADMIN — Medication 260 MILLIGRAM(S): at 06:54

## 2019-03-02 RX ADMIN — Medication 2: at 16:52

## 2019-03-02 RX ADMIN — Medication 4: at 12:15

## 2019-03-02 RX ADMIN — Medication 2: at 06:52

## 2019-03-02 RX ADMIN — SODIUM CHLORIDE 100 MILLILITER(S): 9 INJECTION, SOLUTION INTRAVENOUS at 06:55

## 2019-03-02 RX ADMIN — Medication 2: at 22:08

## 2019-03-02 NOTE — PROGRESS NOTE ADULT - ASSESSMENT
60yo G0 w/ known stage IV endometrial adenocarcinoma s/p staging surgery '18 and 1 round of chemotherapy 2/19 admitted for management of symptomatic rectovaginal and enterovaginal fistulas, now with complex fistula associated bacteruria, hemodynamically stable    1. ID: urine culture on admission negative, repeat UA 2/26 with + nitrites, repeat urine culture from 2/27 positive for  ESBL E. coli, associated due to complex fistula, previously on ceftriaxone but organism resistant, started bactrim DS BID on 3/1  On contact precautions for ESBL Ecoli  2. FEN/GI - NPO, IVH, replete electrolytes PRN, octreotide daily on TPN, for lipids with TPN today 3/2, s/p PICC placement 2/27, GI following recs appreciated  3. PAIN - well controlled, tylenol, oxycodone, motrin PRN  4.  - joseph in place, intermittent periods of leakage of stool per vagina, general surgery consulted for recommendations on management of rectovaginal/enterovaginal fistulas, no surgical intervention at this time, continue with nystatin powder and ointment for mons and labial irritation   5. Endocrine- ISS while patient remains NPO, holding home metformin at this time  6. RESP-  Saturating well on room air, encouraged ISS  8. VTE prophylaxis - SCDs, ambulate as tolerated. Lovenox 40qd, PT    9. PT recommending LITO however patient does not want LITO  - PT consulted to walk patient, as patient needs to be ambulating daily   10. Palliative: palliative consulted, appreciate recommendations  - family discussion w/ Dr. Goode 2/28 regarding goals of care, to continue current management, radiation oncology consulted for recommendations   - continue with lexapro 10mg qhs for depressed mood

## 2019-03-02 NOTE — PROGRESS NOTE ADULT - SUBJECTIVE AND OBJECTIVE BOX
Pt seen and examined at bedside. She reports feeling well overall with some mild itching in her inguinal region.  Remains NPO.  Joseph catheter in place.  Denies dizziness, chest pain, SOB, chills, nausea, vomiting.         T(F): 98 (03-02-19 @ 14:54), Max: 98.7 (03-02-19 @ 08:33)  HR: 74 (03-02-19 @ 14:54) (72 - 79)  BP: 142/80 (03-02-19 @ 14:54) (134/86 - 147/67)  RR: 16 (03-02-19 @ 14:54) (15 - 17)  SpO2: 97% (03-02-19 @ 14:54) (96% - 100%)  Wt(kg): --  I&O's Summary    01 Mar 2019 07:01  -  02 Mar 2019 07:00  --------------------------------------------------------  IN: 2632.7 mL / OUT: 3100 mL / NET: -467.3 mL    02 Mar 2019 07:01  -  02 Mar 2019 16:39  --------------------------------------------------------  IN: 1830 mL / OUT: 1700 mL / NET: 130 mL        MEDICATIONS  (STANDING):  chlorhexidine 2% Cloths 1 Application(s) Topical <User Schedule>  dextrose 5%. 1000 milliLiter(s) (50 mL/Hr) IV Continuous <Continuous>  dextrose 50% Injectable 12.5 Gram(s) IV Push once  dextrose 50% Injectable 25 Gram(s) IV Push once  dextrose 50% Injectable 25 Gram(s) IV Push once  enoxaparin Injectable 40 milliGRAM(s) SubCutaneous daily  escitalopram 10 milliGRAM(s) Oral at bedtime  fat emulsion (Plant Based) 20% IVPB 50 Gram(s) IV Intermittent once  insulin lispro (HumaLOG) corrective regimen sliding scale   SubCutaneous Before meals and at bedtime  lactated ringers. 1000 milliLiter(s) (100 mL/Hr) IV Continuous <Continuous>  nystatin    Suspension [id number] Unit(s) Oral four times a day  pantoprazole  Injectable 40 milliGRAM(s) IV Push every 24 hours  Parenteral Nutrition - Adult 1 Each (83 mL/Hr) TPN Continuous <Continuous>  Parenteral Nutrition - Adult 1 Each (83 mL/Hr) TPN Continuous <Continuous>  trimethoprim / sulfamethoxazole IVPB 160 milliGRAM(s) IV Intermittent every 12 hours    MEDICATIONS  (PRN):  acetaminophen   Tablet .. 650 milliGRAM(s) Oral every 6 hours PRN Temp greater or equal to 38C (100.4F), Mild Pain (1 - 3)  dextrose 40% Gel 15 Gram(s) Oral once PRN Blood Glucose LESS THAN 70 milliGRAM(s)/deciliter  glucagon  Injectable 1 milliGRAM(s) IntraMuscular once PRN Glucose LESS THAN 70 milligrams/deciliter  ibuprofen  Tablet. 600 milliGRAM(s) Oral every 6 hours PRN mild pain (1-3) not relieved with Acetaminophen  oxyCODONE    IR 5 milliGRAM(s) Oral every 6 hours PRN Moderate Pain (4 - 6)  oxyCODONE    IR 10 milliGRAM(s) Oral every 6 hours PRN Severe Pain (7 - 10)  sodium chloride 0.9% lock flush 10 milliLiter(s) IV Push every 1 hour PRN Pre/post blood products, medications, blood draw, and to maintain line patency      Physical Exam:  Gen: No Acute Distress, resting comfortable in bed  Pulm: Normal work of breathing, no wheezes/rhonchi/rales   GI: soft, normoactive bowel sound, no rebound/guarding, LLQ ostomy with no output in bag  : no stool noted per vagina, pad clean, mons and labia minimal erythema with nystatin powder; joseph catheter in place  Ext: SCDs in place, non tender      LABS:                        8.0    6.27  )-----------( 366      ( 02 Mar 2019 06:14 )             26.0     03-02    138  |  100  |  16  ----------------------------<  184<H>  4.6   |  29  |  0.49<L>    Ca    9.0      02 Mar 2019 06:13  Phos  4.3     03-02  Mg     1.9     03-02    TPro  6.1  /  Alb  2.9<L>  /  TBili  <0.2  /  DBili  x   /  AST  12  /  ALT  10  /  AlkPhos  80  03-02

## 2019-03-02 NOTE — PROGRESS NOTE ADULT - ASSESSMENT
60yo G0 w/ known stage IV endometrial adenocarcinoma s/p staging surgery '18 and 1 round of chemotherapy 2/19 admitted for management of symptomatic rectovaginal and enterovaginal fistulas, now with complex fistula associated bacteruria, hemodynamically stable    1. ID: urine culture on admission negative, repeat UA 2/26 with + nitrites, repeat urine culture from 2/27 positive for  ESBL E. coli, associated due to complex fistula, previously on ceftriaxone but organism resistant, started bactrim DS BID on 3/1  On contact precautions for ESBL Ecoli  2. FEN/GI - NPO, IVH, replete electrolytes PRN, octreotide daily on TPN, for lipids with TPN today 3/2, s/p PICC 2/27, GI following recs appreciated  3. PAIN - OPM PRN  4.  - joseph in place, intermittent periods of leakage of stool per vagina, general surgery consulted for recommendations on management of rectovaginal/enterovaginal fistulas, no surgical intervention at this time, continue with nystatin powder and ointment for mons and labial irritation   5. Endocrine- ISS while patient remains NPO, holding home metformin at this time  6. RESP-  Saturating well on room air, encouraged ISS  8. VTE prophylaxis - SCDs, ambulate as tolerated. Lovenox 40qd, PT    9. PT recommending LITO however patient does not want LITO  - PT to come walk patient today, as patient needs to be ambulating daily   10. Palliative: palliative consulted, appreciate recommendations  - family discussion w/ Dr. Goode 2/28 regarding goals of care, to continue current management, radiation oncology consulted for recommendations 60yo G0 w/ known stage IV endometrial adenocarcinoma s/p staging surgery '18 and 1 round of chemotherapy 2/19 admitted for management of symptomatic rectovaginal and enterovaginal fistulas, now with complex fistula associated bacteruria, hemodynamically stable    1. ID: urine culture on admission negative, repeat UA 2/26 with + nitrites, repeat urine culture from 2/27 positive for  ESBL E. coli, associated due to complex fistula, previously on ceftriaxone but organism resistant, started bactrim DS BID on 3/1  On contact precautions for ESBL Ecoli  2. FEN/GI - NPO, IVH, replete electrolytes PRN, octreotide daily on TPN, for lipids with TPN today 3/2, s/p PICC 2/27, GI following recs appreciated  3. PAIN - OPM PRN  4.  - joseph in place, intermittent periods of leakage of stool per vagina, general surgery consulted for recommendations on management of rectovaginal/enterovaginal fistulas, no surgical intervention at this time, continue with nystatin powder and ointment for mons and labial irritation   5. Endocrine- ISS while patient remains NPO, holding home metformin at this time  6. RESP-  Saturating well on room air, encouraged ISS  8. VTE prophylaxis - SCDs, ambulate as tolerated. Lovenox 40qd, PT    9. PT recommending LITO however patient does not want LITO  - PT to come walk patient today, as patient needs to be ambulating daily   10. Palliative: palliative consulted, appreciate recommendations  - family discussion w/ Dr. Goode 2/28 regarding goals of care, to continue current management, radiation oncology consulted for recommendations   - continue with lexapro 10mg qhs for depressed mood

## 2019-03-02 NOTE — PROGRESS NOTE ADULT - SUBJECTIVE AND OBJECTIVE BOX
GYN Progress Note    Patient seen at bedside this morning. Resting comfortable in bed, slept well throughout night. Vaginal irritation/discomfort improved with less pruritus. Pain well controlled. Remains NPO. [place] in place. Did not ambulate yesterday. Denies headache, dizziness, CP, palpitations, SOB, fever, chills, nausea, vomiting.    Vital Signs Last 24 Hrs  T(C): 36.8 (02 Mar 2019 05:11), Max: 37 (01 Mar 2019 14:32)  T(F): 98.2 (02 Mar 2019 05:11), Max: 98.6 (01 Mar 2019 14:32)  HR: 79 (02 Mar 2019 05:11) (72 - 79)  BP: 134/86 (02 Mar 2019 05:11) (134/86 - 147/90)  BP(mean): --  RR: 16 (02 Mar 2019 05:11) (15 - 17)  SpO2: 97% (02 Mar 2019 05:11) (96% - 97%)    Physical Exam:  Gen: No Acute Distress, resting comfortable in bed  Pulm: Normal work of breathing, no wheezes/rhonchi/rales   GI: soft, normoactive bowel sound, no rebound/guarding, LLQ ostomy with formed brown stool, no CVA tenderness  : no stool noted per vagina, pad clean, mons and labia minimal erythema with nystatin powder  Ext: SCDs in place, non tender    I&O's Summary    28 Feb 2019 07:01  -  01 Mar 2019 07:00  --------------------------------------------------------  IN: 1952.9 mL / OUT: 4400 mL / NET: -2447.1 mL    01 Mar 2019 07:01  -  02 Mar 2019 06:32  --------------------------------------------------------  IN: 2632.7 mL / OUT: 3100 mL / NET: -467.3 mL      MEDICATIONS  (STANDING):  chlorhexidine 2% Cloths 1 Application(s) Topical <User Schedule>  dextrose 5%. 1000 milliLiter(s) (50 mL/Hr) IV Continuous <Continuous>  dextrose 50% Injectable 12.5 Gram(s) IV Push once  dextrose 50% Injectable 25 Gram(s) IV Push once  dextrose 50% Injectable 25 Gram(s) IV Push once  enoxaparin Injectable 40 milliGRAM(s) SubCutaneous daily  escitalopram 10 milliGRAM(s) Oral at bedtime  insulin lispro (HumaLOG) corrective regimen sliding scale   SubCutaneous Before meals and at bedtime  lactated ringers. 1000 milliLiter(s) (100 mL/Hr) IV Continuous <Continuous>  nystatin    Suspension 723178 Unit(s) Oral four times a day  pantoprazole  Injectable 40 milliGRAM(s) IV Push every 24 hours  Parenteral Nutrition - Adult 1 Each (83 mL/Hr) TPN Continuous <Continuous>  trimethoprim / sulfamethoxazole IVPB 160 milliGRAM(s) IV Intermittent every 12 hours    MEDICATIONS  (PRN):  acetaminophen   Tablet .. 650 milliGRAM(s) Oral every 6 hours PRN Temp greater or equal to 38C (100.4F), Mild Pain (1 - 3)  dextrose 40% Gel 15 Gram(s) Oral once PRN Blood Glucose LESS THAN 70 milliGRAM(s)/deciliter  glucagon  Injectable 1 milliGRAM(s) IntraMuscular once PRN Glucose LESS THAN 70 milligrams/deciliter  ibuprofen  Tablet. 600 milliGRAM(s) Oral every 6 hours PRN mild pain (1-3) not relieved with Acetaminophen  oxyCODONE    IR 5 milliGRAM(s) Oral every 6 hours PRN Moderate Pain (4 - 6)  oxyCODONE    IR 10 milliGRAM(s) Oral every 6 hours PRN Severe Pain (7 - 10)  sodium chloride 0.9% lock flush 10 milliLiter(s) IV Push every 1 hour PRN Pre/post blood products, medications, blood draw, and to maintain line patency      LABS:                        8.0    6.27  )-----------( 366      ( 02 Mar 2019 06:14 )             26.0     02-28    138  |  99  |  14  ----------------------------<  171<H>  3.8   |  30  |  0.51    Ca    9.0      28 Feb 2019 11:57  Phos  3.7     02-28  Mg     1.8     02-28    TPro  6.2  /  Alb  3.3  /  TBili  <0.2  /  DBili  x   /  AST  11  /  ALT  13  /  AlkPhos  74  02-28

## 2019-03-03 LAB
ALBUMIN SERPL ELPH-MCNC: 2.8 G/DL — LOW (ref 3.3–5)
ALP SERPL-CCNC: 82 U/L — SIGNIFICANT CHANGE UP (ref 40–120)
ALT FLD-CCNC: 10 U/L — SIGNIFICANT CHANGE UP (ref 10–45)
ANION GAP SERPL CALC-SCNC: 11 MMOL/L — SIGNIFICANT CHANGE UP (ref 5–17)
AST SERPL-CCNC: 14 U/L — SIGNIFICANT CHANGE UP (ref 10–40)
BILIRUB SERPL-MCNC: <0.2 MG/DL — SIGNIFICANT CHANGE UP (ref 0.2–1.2)
BUN SERPL-MCNC: 14 MG/DL — SIGNIFICANT CHANGE UP (ref 7–23)
CALCIUM SERPL-MCNC: 9.3 MG/DL — SIGNIFICANT CHANGE UP (ref 8.4–10.5)
CHLORIDE SERPL-SCNC: 101 MMOL/L — SIGNIFICANT CHANGE UP (ref 96–108)
CO2 SERPL-SCNC: 28 MMOL/L — SIGNIFICANT CHANGE UP (ref 22–31)
CREAT SERPL-MCNC: 0.57 MG/DL — SIGNIFICANT CHANGE UP (ref 0.5–1.3)
GLUCOSE BLDC GLUCOMTR-MCNC: 141 MG/DL — HIGH (ref 70–99)
GLUCOSE BLDC GLUCOMTR-MCNC: 184 MG/DL — HIGH (ref 70–99)
GLUCOSE BLDC GLUCOMTR-MCNC: 188 MG/DL — HIGH (ref 70–99)
GLUCOSE BLDC GLUCOMTR-MCNC: 229 MG/DL — HIGH (ref 70–99)
GLUCOSE SERPL-MCNC: 156 MG/DL — HIGH (ref 70–99)
HCT VFR BLD CALC: 26.6 % — LOW (ref 34.5–45)
HGB BLD-MCNC: 8.3 G/DL — LOW (ref 11.5–15.5)
MAGNESIUM SERPL-MCNC: 1.7 MG/DL — SIGNIFICANT CHANGE UP (ref 1.6–2.6)
MCHC RBC-ENTMCNC: 29.6 PG — SIGNIFICANT CHANGE UP (ref 27–34)
MCHC RBC-ENTMCNC: 31.2 GM/DL — LOW (ref 32–36)
MCV RBC AUTO: 95 FL — SIGNIFICANT CHANGE UP (ref 80–100)
NRBC # BLD: 0 /100 WBCS — SIGNIFICANT CHANGE UP (ref 0–0)
PHOSPHATE SERPL-MCNC: 4 MG/DL — SIGNIFICANT CHANGE UP (ref 2.5–4.5)
PLATELET # BLD AUTO: 436 K/UL — HIGH (ref 150–400)
POTASSIUM SERPL-MCNC: 4.5 MMOL/L — SIGNIFICANT CHANGE UP (ref 3.5–5.3)
POTASSIUM SERPL-SCNC: 4.5 MMOL/L — SIGNIFICANT CHANGE UP (ref 3.5–5.3)
PREALB SERPL-MCNC: 18 MG/DL — LOW (ref 20–40)
PROT SERPL-MCNC: 6.5 G/DL — SIGNIFICANT CHANGE UP (ref 6–8.3)
RBC # BLD: 2.8 M/UL — LOW (ref 3.8–5.2)
RBC # FLD: 16.5 % — HIGH (ref 10.3–14.5)
SODIUM SERPL-SCNC: 140 MMOL/L — SIGNIFICANT CHANGE UP (ref 135–145)
TRIGL SERPL-MCNC: 383 MG/DL — HIGH (ref 10–149)
WBC # BLD: 7.13 K/UL — SIGNIFICANT CHANGE UP (ref 3.8–10.5)
WBC # FLD AUTO: 7.13 K/UL — SIGNIFICANT CHANGE UP (ref 3.8–10.5)

## 2019-03-03 PROCEDURE — 99231 SBSQ HOSP IP/OBS SF/LOW 25: CPT

## 2019-03-03 RX ORDER — ELECTROLYTE SOLUTION,INJ
1 VIAL (ML) INTRAVENOUS
Qty: 0 | Refills: 0 | Status: DISCONTINUED | OUTPATIENT
Start: 2019-03-03 | End: 2019-03-03

## 2019-03-03 RX ADMIN — Medication 650 MILLIGRAM(S): at 19:37

## 2019-03-03 RX ADMIN — Medication 4: at 21:57

## 2019-03-03 RX ADMIN — ENOXAPARIN SODIUM 40 MILLIGRAM(S): 100 INJECTION SUBCUTANEOUS at 11:44

## 2019-03-03 RX ADMIN — Medication 260 MILLIGRAM(S): at 06:17

## 2019-03-03 RX ADMIN — Medication 260 MILLIGRAM(S): at 18:50

## 2019-03-03 RX ADMIN — Medication 500000 UNIT(S): at 18:50

## 2019-03-03 RX ADMIN — Medication 650 MILLIGRAM(S): at 18:49

## 2019-03-03 RX ADMIN — Medication 500000 UNIT(S): at 06:17

## 2019-03-03 RX ADMIN — ESCITALOPRAM OXALATE 10 MILLIGRAM(S): 10 TABLET, FILM COATED ORAL at 21:57

## 2019-03-03 RX ADMIN — Medication 500000 UNIT(S): at 11:44

## 2019-03-03 RX ADMIN — Medication 2: at 11:36

## 2019-03-03 RX ADMIN — Medication 2: at 06:18

## 2019-03-03 RX ADMIN — CHLORHEXIDINE GLUCONATE 1 APPLICATION(S): 213 SOLUTION TOPICAL at 06:05

## 2019-03-03 RX ADMIN — SODIUM CHLORIDE 100 MILLILITER(S): 9 INJECTION, SOLUTION INTRAVENOUS at 21:58

## 2019-03-03 RX ADMIN — PANTOPRAZOLE SODIUM 40 MILLIGRAM(S): 20 TABLET, DELAYED RELEASE ORAL at 06:17

## 2019-03-03 RX ADMIN — Medication 1 EACH: at 18:50

## 2019-03-03 RX ADMIN — Medication 500000 UNIT(S): at 00:28

## 2019-03-03 RX ADMIN — SODIUM CHLORIDE 100 MILLILITER(S): 9 INJECTION, SOLUTION INTRAVENOUS at 06:18

## 2019-03-03 NOTE — PROGRESS NOTE ADULT - SUBJECTIVE AND OBJECTIVE BOX
GYN Progress Note    Patient seen at bedside this afternoon, nephew at bedside. Denies any pain at this time. States she is had mild itching in labial region, that resolved after ointment placement. Remains NPO, tolerating TPN. Has not tried ambulating today, states she moved to chair and did exercises yesterday. Plan to work with PT today and do exercises   Denies CP, palpitations, SOB, fever, chills, nausea, vomiting.    Vital Signs Last 24 Hrs  T(C): 37.1 (03 Mar 2019 08:23), Max: 37.1 (03 Mar 2019 08:23)  T(F): 98.7 (03 Mar 2019 08:23), Max: 98.7 (03 Mar 2019 08:23)  HR: 63 (03 Mar 2019 08:23) (63 - 78)  BP: 161/99 (03 Mar 2019 08:23) (142/80 - 161/99)  BP(mean): --  RR: 17 (03 Mar 2019 08:23) (16 - 17)  SpO2: 97% (03 Mar 2019 08:23) (96% - 97%)    Physical Exam:  Gen: No Acute Distress, resting comfortable in bed  Pulm: Normal work of breathing, no wheezes/rhonchi/rales   GI: soft, normoactive bowel sound, no rebound/guarding, LLQ ostomy with gas in bag, no stool output in bag   : no stool noted per vagina, pad clean, mons and labia minimal erythema; joseph catheter in place  Ext: SCDs in place, non tender    I&O's Summary    02 Mar 2019 07:01  -  03 Mar 2019 07:00  --------------------------------------------------------  IN: 4399.5 mL / OUT: 5730 mL / NET: -1330.5 mL    03 Mar 2019 07:01  -  03 Mar 2019 13:09  --------------------------------------------------------  IN: 1099.8 mL / OUT: 1100 mL / NET: -0.2 mL      MEDICATIONS  (STANDING):  chlorhexidine 2% Cloths 1 Application(s) Topical <User Schedule>  dextrose 5%. 1000 milliLiter(s) (50 mL/Hr) IV Continuous <Continuous>  dextrose 50% Injectable 12.5 Gram(s) IV Push once  dextrose 50% Injectable 25 Gram(s) IV Push once  dextrose 50% Injectable 25 Gram(s) IV Push once  enoxaparin Injectable 40 milliGRAM(s) SubCutaneous daily  escitalopram 10 milliGRAM(s) Oral at bedtime  insulin lispro (HumaLOG) corrective regimen sliding scale   SubCutaneous Before meals and at bedtime  lactated ringers. 1000 milliLiter(s) (100 mL/Hr) IV Continuous <Continuous>  nystatin    Suspension 075755 Unit(s) Oral four times a day  pantoprazole  Injectable 40 milliGRAM(s) IV Push every 24 hours  Parenteral Nutrition - Adult 1 Each (83 mL/Hr) TPN Continuous <Continuous>  Parenteral Nutrition - Adult 1 Each (83 mL/Hr) TPN Continuous <Continuous>  trimethoprim / sulfamethoxazole IVPB 160 milliGRAM(s) IV Intermittent every 12 hours    MEDICATIONS  (PRN):  acetaminophen   Tablet .. 650 milliGRAM(s) Oral every 6 hours PRN Temp greater or equal to 38C (100.4F), Mild Pain (1 - 3)  dextrose 40% Gel 15 Gram(s) Oral once PRN Blood Glucose LESS THAN 70 milliGRAM(s)/deciliter  glucagon  Injectable 1 milliGRAM(s) IntraMuscular once PRN Glucose LESS THAN 70 milligrams/deciliter  ibuprofen  Tablet. 600 milliGRAM(s) Oral every 6 hours PRN mild pain (1-3) not relieved with Acetaminophen  oxyCODONE    IR 5 milliGRAM(s) Oral every 6 hours PRN Moderate Pain (4 - 6)  oxyCODONE    IR 10 milliGRAM(s) Oral every 6 hours PRN Severe Pain (7 - 10)  sodium chloride 0.9% lock flush 10 milliLiter(s) IV Push every 1 hour PRN Pre/post blood products, medications, blood draw, and to maintain line patency      LABS:                        8.3    7.13  )-----------( 436      ( 03 Mar 2019 07:44 )             26.6     03-03    140  |  101  |  14  ----------------------------<  156<H>  4.5   |  28  |  0.57    Ca    9.3      03 Mar 2019 07:44  Phos  4.0     03-03  Mg     1.7     03-03    TPro  6.5  /  Alb  2.8<L>  /  TBili  <0.2  /  DBili  x   /  AST  14  /  ALT  10  /  AlkPhos  82  03-03

## 2019-03-03 NOTE — PROGRESS NOTE ADULT - ASSESSMENT
58yo G0 w/ known stage IV endometrial adenocarcinoma s/p staging surgery '18 and 1 round of chemotherapy 2/19 admitted for management of symptomatic rectovaginal and enterovaginal fistulas, now with complex fistula associated bacteruria, hemodynamically stable and afebrile.     1. ID: urine culture on admission negative, repeat UA 2/26 with + nitrites, repeat urine culture from 2/27 positive for  ESBL E. coli, associated due to complex fistula, previously on ceftriaxone but organism resistant, started bactrim DS BID on 3/1  On contact precautions for ESBL Ecoli  2. FEN/GI - NPO, IVH, replete electrolytes PRN, octreotide daily on TPN, Lipids next on 3/4 , s/p PICC placement 2/27, GI following recs appreciated, electrolytes repleted via TPN  3. PAIN - well controlled, tylenol, oxycodone, motrin PRN  4.  - joseph in place, intermittent periods of leakage of stool per vagina, general surgery consulted for recommendations on management of rectovaginal/enterovaginal fistulas, no surgical intervention at this time, continue with nystatin powder and ointment for mons and labial irritation   5. Endocrine- ISS while patient remains NPO, holding home metformin at this time  6. RESP-  Saturating well on room air, encouraged ISS  8. VTE prophylaxis - SCDs, ambulate as tolerated with PT, Lovenox 40qd, PT    9. PT recommending LITO however patient does not want LITO  - PT consulted to walk patient, as patient needs to be ambulating daily and daily exercises   10. Palliative: palliative consulted, appreciate recommendations  - family discussion w/ Dr. Goode 2/28 regarding goals of care, to continue current management, radiation oncology consulted for recommendations   - continue with Lexapro 10mg qhs for depressed mood

## 2019-03-03 NOTE — PROGRESS NOTE ADULT - ASSESSMENT
58yo G0 w/ known stage IV endometrial adenocarcinoma s/p staging surgery '18 and 1 round of chemotherapy 2/19 admitted for management of symptomatic rectovaginal and enterovaginal fistulas, now with complex fistula associated bacteruria, hemodynamically stable    1. ID: urine culture on admission negative, repeat UA 2/26 with + nitrites, repeat urine culture from 2/27 positive for  ESBL E. coli, associated due to complex fistula, previously on ceftriaxone but organism resistant, started bactrim DS BID on 3/1  On contact precautions for ESBL Ecoli  2. FEN/GI - NPO, IVH, replete electrolytes PRN, octreotide daily on TPN, Lipids yesterday, for lipids with TPN today 3/4, s/p PICC placement 2/27, GI following recs appreciated  3. PAIN - well controlled, tylenol, oxycodone, motrin PRN  4.  - joseph in place, intermittent periods of leakage of stool per vagina, general surgery consulted for recommendations on management of rectovaginal/enterovaginal fistulas, no surgical intervention at this time, continue with nystatin powder and ointment for mons and labial irritation   5. Endocrine- ISS while patient remains NPO, holding home metformin at this time  6. RESP-  Saturating well on room air, encouraged ISS  8. VTE prophylaxis - SCDs, ambulate as tolerated. Lovenox 40qd, PT    9. PT recommending LITO however patient does not want LITO  - PT consulted to walk patient, as patient needs to be ambulating daily   10. Palliative: palliative consulted, appreciate recommendations  - family discussion w/ Dr. Goode 2/28 regarding goals of care, to continue current management, radiation oncology consulted for recommendations   - continue with lexapro 10mg qhs for depressed mood

## 2019-03-03 NOTE — PROGRESS NOTE ADULT - SUBJECTIVE AND OBJECTIVE BOX
Pt seen and examined at bedside. She reports feeling well with some mild itching at her left groin, improved with application of topical cream.  No episodes of leakage of stool.  Denies any abdominal pain, nausea, SOB, chest pain.    T(F): 96.3 (03-03-19 @ 06:15), Max: 98.7 (03-02-19 @ 08:33)  HR: 78 (03-03-19 @ 06:15) (73 - 78)  BP: 142/88 (03-03-19 @ 06:15) (142/80 - 147/67)  RR: 16 (03-03-19 @ 06:15) (16 - 17)  SpO2: 97% (03-03-19 @ 06:15) (96% - 100%)  Wt(kg): --  I&O's Summary    02 Mar 2019 07:01  -  03 Mar 2019 07:00  --------------------------------------------------------  IN: 4399.5 mL / OUT: 5730 mL / NET: -1330.5 mL        MEDICATIONS  (STANDING):  chlorhexidine 2% Cloths 1 Application(s) Topical <User Schedule>  dextrose 5%. 1000 milliLiter(s) (50 mL/Hr) IV Continuous <Continuous>  dextrose 50% Injectable 12.5 Gram(s) IV Push once  dextrose 50% Injectable 25 Gram(s) IV Push once  dextrose 50% Injectable 25 Gram(s) IV Push once  enoxaparin Injectable 40 milliGRAM(s) SubCutaneous daily  escitalopram 10 milliGRAM(s) Oral at bedtime  insulin lispro (HumaLOG) corrective regimen sliding scale   SubCutaneous Before meals and at bedtime  lactated ringers. 1000 milliLiter(s) (100 mL/Hr) IV Continuous <Continuous>  nystatin    Suspension 448223 Unit(s) Oral four times a day  pantoprazole  Injectable 40 milliGRAM(s) IV Push every 24 hours  Parenteral Nutrition - Adult 1 Each (83 mL/Hr) TPN Continuous <Continuous>  trimethoprim / sulfamethoxazole IVPB 160 milliGRAM(s) IV Intermittent every 12 hours    MEDICATIONS  (PRN):  acetaminophen   Tablet .. 650 milliGRAM(s) Oral every 6 hours PRN Temp greater or equal to 38C (100.4F), Mild Pain (1 - 3)  dextrose 40% Gel 15 Gram(s) Oral once PRN Blood Glucose LESS THAN 70 milliGRAM(s)/deciliter  glucagon  Injectable 1 milliGRAM(s) IntraMuscular once PRN Glucose LESS THAN 70 milligrams/deciliter  ibuprofen  Tablet. 600 milliGRAM(s) Oral every 6 hours PRN mild pain (1-3) not relieved with Acetaminophen  oxyCODONE    IR 5 milliGRAM(s) Oral every 6 hours PRN Moderate Pain (4 - 6)  oxyCODONE    IR 10 milliGRAM(s) Oral every 6 hours PRN Severe Pain (7 - 10)  sodium chloride 0.9% lock flush 10 milliLiter(s) IV Push every 1 hour PRN Pre/post blood products, medications, blood draw, and to maintain line patency      Physical Exam:  Gen: No Acute Distress, resting comfortable in bed  Pulm: Normal work of breathing, no wheezes/rhonchi/rales   GI: soft, normoactive bowel sound, no rebound/guarding, LLQ ostomy with no output in bag, gas present in bag  : no stool noted per vagina, pad clean, mons and labia minimal erythema with nystatin powder; joseph catheter in place  Ext: SCDs in place, non tender      LABS:                        8.0    6.27  )-----------( 366      ( 02 Mar 2019 06:14 )             26.0     03-02    138  |  100  |  16  ----------------------------<  184<H>  4.6   |  29  |  0.49<L>    Ca    9.0      02 Mar 2019 06:13  Phos  4.3     03-02  Mg     1.9     03-02    TPro  6.1  /  Alb  2.9<L>  /  TBili  <0.2  /  DBili  x   /  AST  12  /  ALT  10  /  AlkPhos  80  03-02

## 2019-03-04 LAB
ALBUMIN SERPL ELPH-MCNC: 3 G/DL — LOW (ref 3.3–5)
ALP SERPL-CCNC: 86 U/L — SIGNIFICANT CHANGE UP (ref 40–120)
ALT FLD-CCNC: 11 U/L — SIGNIFICANT CHANGE UP (ref 10–45)
ANION GAP SERPL CALC-SCNC: 11 MMOL/L — SIGNIFICANT CHANGE UP (ref 5–17)
APTT BLD: 29.2 SEC — SIGNIFICANT CHANGE UP (ref 27.5–36.3)
AST SERPL-CCNC: 13 U/L — SIGNIFICANT CHANGE UP (ref 10–40)
BILIRUB SERPL-MCNC: 0.2 MG/DL — SIGNIFICANT CHANGE UP (ref 0.2–1.2)
BUN SERPL-MCNC: 15 MG/DL — SIGNIFICANT CHANGE UP (ref 7–23)
CALCIUM SERPL-MCNC: 9.4 MG/DL — SIGNIFICANT CHANGE UP (ref 8.4–10.5)
CHLORIDE SERPL-SCNC: 99 MMOL/L — SIGNIFICANT CHANGE UP (ref 96–108)
CHOLEST SERPL-MCNC: 121 MG/DL — SIGNIFICANT CHANGE UP (ref 10–199)
CO2 SERPL-SCNC: 28 MMOL/L — SIGNIFICANT CHANGE UP (ref 22–31)
CREAT SERPL-MCNC: 0.58 MG/DL — SIGNIFICANT CHANGE UP (ref 0.5–1.3)
FIBRINOGEN PPP-MCNC: 415 MG/DL — SIGNIFICANT CHANGE UP (ref 258–438)
GLUCOSE BLDC GLUCOMTR-MCNC: 152 MG/DL — HIGH (ref 70–99)
GLUCOSE BLDC GLUCOMTR-MCNC: 153 MG/DL — HIGH (ref 70–99)
GLUCOSE BLDC GLUCOMTR-MCNC: 186 MG/DL — HIGH (ref 70–99)
GLUCOSE BLDC GLUCOMTR-MCNC: 192 MG/DL — HIGH (ref 70–99)
GLUCOSE SERPL-MCNC: 168 MG/DL — HIGH (ref 70–99)
HCT VFR BLD CALC: 26.4 % — LOW (ref 34.5–45)
HDLC SERPL-MCNC: 30 MG/DL — LOW
HGB BLD-MCNC: 8.1 G/DL — LOW (ref 11.5–15.5)
INR BLD: 1.09 — SIGNIFICANT CHANGE UP (ref 0.88–1.16)
LIPID PNL WITH DIRECT LDL SERPL: 53 MG/DL — SIGNIFICANT CHANGE UP
MAGNESIUM SERPL-MCNC: 1.8 MG/DL — SIGNIFICANT CHANGE UP (ref 1.6–2.6)
MCHC RBC-ENTMCNC: 29.1 PG — SIGNIFICANT CHANGE UP (ref 27–34)
MCHC RBC-ENTMCNC: 30.7 GM/DL — LOW (ref 32–36)
MCV RBC AUTO: 95 FL — SIGNIFICANT CHANGE UP (ref 80–100)
NRBC # BLD: 0 /100 WBCS — SIGNIFICANT CHANGE UP (ref 0–0)
PHOSPHATE SERPL-MCNC: 4.5 MG/DL — SIGNIFICANT CHANGE UP (ref 2.5–4.5)
PLATELET # BLD AUTO: 369 K/UL — SIGNIFICANT CHANGE UP (ref 150–400)
POTASSIUM SERPL-MCNC: 4.8 MMOL/L — SIGNIFICANT CHANGE UP (ref 3.5–5.3)
POTASSIUM SERPL-SCNC: 4.8 MMOL/L — SIGNIFICANT CHANGE UP (ref 3.5–5.3)
PROT SERPL-MCNC: 6.5 G/DL — SIGNIFICANT CHANGE UP (ref 6–8.3)
PROTHROM AB SERPL-ACNC: 12.4 SEC — SIGNIFICANT CHANGE UP (ref 10–12.9)
RBC # BLD: 2.78 M/UL — LOW (ref 3.8–5.2)
RBC # FLD: 17 % — HIGH (ref 10.3–14.5)
SODIUM SERPL-SCNC: 138 MMOL/L — SIGNIFICANT CHANGE UP (ref 135–145)
TOTAL CHOLESTEROL/HDL RATIO MEASUREMENT: 4 RATIO — SIGNIFICANT CHANGE UP (ref 3.3–7.1)
TRIGL SERPL-MCNC: 189 MG/DL — HIGH (ref 10–149)
WBC # BLD: 4.86 K/UL — SIGNIFICANT CHANGE UP (ref 3.8–10.5)
WBC # FLD AUTO: 4.86 K/UL — SIGNIFICANT CHANGE UP (ref 3.8–10.5)

## 2019-03-04 PROCEDURE — ZZZZZ: CPT

## 2019-03-04 PROCEDURE — 99231 SBSQ HOSP IP/OBS SF/LOW 25: CPT

## 2019-03-04 PROCEDURE — 99233 SBSQ HOSP IP/OBS HIGH 50: CPT

## 2019-03-04 RX ORDER — I.V. FAT EMULSION 20 G/100ML
0.81 EMULSION INTRAVENOUS
Qty: 50 | Refills: 0 | Status: DISCONTINUED | OUTPATIENT
Start: 2019-03-04 | End: 2019-03-04

## 2019-03-04 RX ORDER — ELECTROLYTE SOLUTION,INJ
1 VIAL (ML) INTRAVENOUS
Qty: 0 | Refills: 0 | Status: DISCONTINUED | OUTPATIENT
Start: 2019-03-04 | End: 2019-03-04

## 2019-03-04 RX ADMIN — Medication 500000 UNIT(S): at 23:47

## 2019-03-04 RX ADMIN — ENOXAPARIN SODIUM 40 MILLIGRAM(S): 100 INJECTION SUBCUTANEOUS at 12:03

## 2019-03-04 RX ADMIN — Medication 2: at 21:46

## 2019-03-04 RX ADMIN — SODIUM CHLORIDE 100 MILLILITER(S): 9 INJECTION, SOLUTION INTRAVENOUS at 07:05

## 2019-03-04 RX ADMIN — Medication 83 EACH: at 17:40

## 2019-03-04 RX ADMIN — Medication 260 MILLIGRAM(S): at 17:33

## 2019-03-04 RX ADMIN — Medication 500000 UNIT(S): at 12:04

## 2019-03-04 RX ADMIN — SODIUM CHLORIDE 100 MILLILITER(S): 9 INJECTION, SOLUTION INTRAVENOUS at 17:40

## 2019-03-04 RX ADMIN — Medication 260 MILLIGRAM(S): at 07:04

## 2019-03-04 RX ADMIN — Medication 500000 UNIT(S): at 17:34

## 2019-03-04 RX ADMIN — PANTOPRAZOLE SODIUM 40 MILLIGRAM(S): 20 TABLET, DELAYED RELEASE ORAL at 07:04

## 2019-03-04 RX ADMIN — I.V. FAT EMULSION 20.8 GM/KG/DAY: 20 EMULSION INTRAVENOUS at 21:47

## 2019-03-04 RX ADMIN — CHLORHEXIDINE GLUCONATE 1 APPLICATION(S): 213 SOLUTION TOPICAL at 07:04

## 2019-03-04 RX ADMIN — Medication 2: at 11:50

## 2019-03-04 RX ADMIN — Medication 500000 UNIT(S): at 07:04

## 2019-03-04 RX ADMIN — Medication 2: at 17:33

## 2019-03-04 RX ADMIN — Medication 2: at 07:00

## 2019-03-04 NOTE — PROGRESS NOTE ADULT - ASSESSMENT
58yo G0 w/ known stage IV endometrial adenocarcinoma s/p staging surgery '18 and 1 round of chemotherapy 2/19 admitted for management of symptomatic rectovaginal and enterovaginal fistulas, now with complex fistula associated bacteruria, hemodynamically stable and afebrile    1. ID: urine culture from 2/27 positive for ESBL E. coli, associated due to complex fistula, previously on ceftriaxone but organism resistant, started bactrim DS BID on 3/1, will repeat urine cx 3/5, on precautions  2. FEN/GI - NPO, IVH, replete electrolytes PRN, octreotide daily on TPN, Lipids next on 3/4, s/p PICC placement 2/27, GI following recs appreciated, electrolytes repleted via TPN  3. PAIN - well controlled, tylenol, oxycodone, motrin PRN  4.  - joseph in place, intermittent periods of leakage of scant amount of stool per vagina, general surgery consulted for recommendations on management of rectovaginal/enterovaginal fistulas, no surgical intervention at this time, continue with nystatin powder and ointment for mons and labial irritation   5. Endocrine- ISS while patient remains NPO, holding home metformin at this time  6. RESP-  Saturating well on room air, encouraged ISS  8. VTE prophylaxis - SCDs, ambulate as tolerated with PT, Lovenox 40qd, PT    9. PT recommending LITO however patient does not want LITO  - PT consulted to walk patient, as patient needs to be ambulating daily and daily exercises   10. Palliative: palliative consulted, appreciate recommendations  - family discussion w/ Dr. Goode 2/28 regarding goals of care, to continue current management, radiation oncology consulted  - continue with Lexapro 10mg qhs for depressed mood

## 2019-03-04 NOTE — PROGRESS NOTE ADULT - SUBJECTIVE AND OBJECTIVE BOX
Pt seen and examined at bedside. Pt states she overall feels fine and is waiting for someone to assist her in walking. She states no stool output from vagina today and states vaginal itching has significantly improved.  Pt denies fever, chills, chest pain, SOB, nausea, vomiting, lightheadedness, or dizziness.      T(F): 97.4 (03-04-19 @ 12:30), Max: 97.6 (03-04-19 @ 08:38)  HR: 72 (03-04-19 @ 12:30) (70 - 78)  BP: 124/86 (03-04-19 @ 12:30) (109/73 - 156/106)  RR: 17 (03-04-19 @ 12:30) (16 - 18)  SpO2: 97% (03-04-19 @ 12:30) (95% - 100%)  Wt(kg): --  I&O's Summary    03 Mar 2019 07:01  -  04 Mar 2019 07:00  --------------------------------------------------------  IN: 4399.2 mL / OUT: 4701 mL / NET: -301.8 mL    04 Mar 2019 07:01  -  04 Mar 2019 14:06  --------------------------------------------------------  IN: 732 mL / OUT: 2300 mL / NET: -1568 mL    MEDICATIONS  (STANDING):  chlorhexidine 2% Cloths 1 Application(s) Topical <User Schedule>  dextrose 5%. 1000 milliLiter(s) (50 mL/Hr) IV Continuous <Continuous>  dextrose 50% Injectable 12.5 Gram(s) IV Push once  dextrose 50% Injectable 25 Gram(s) IV Push once  dextrose 50% Injectable 25 Gram(s) IV Push once  enoxaparin Injectable 40 milliGRAM(s) SubCutaneous daily  escitalopram 10 milliGRAM(s) Oral at bedtime  fat emulsion (Plant Based) 20% Infusion 0.81 Gm/kG/Day (20.8 mL/Hr) IV Continuous <Continuous>  insulin lispro (HumaLOG) corrective regimen sliding scale   SubCutaneous Before meals and at bedtime  lactated ringers. 1000 milliLiter(s) (100 mL/Hr) IV Continuous <Continuous>  nystatin    Suspension [id number] Unit(s) Oral four times a day  pantoprazole  Injectable 40 milliGRAM(s) IV Push every 24 hours  Parenteral Nutrition - Adult 1 Each (83 mL/Hr) TPN Continuous <Continuous>  Parenteral Nutrition - Adult 1 Each (83 mL/Hr) TPN Continuous <Continuous>  trimethoprim / sulfamethoxazole IVPB 160 milliGRAM(s) IV Intermittent every 12 hours    MEDICATIONS  (PRN):  acetaminophen   Tablet .. 650 milliGRAM(s) Oral every 6 hours PRN Temp greater or equal to 38C (100.4F), Mild Pain (1 - 3)  dextrose 40% Gel 15 Gram(s) Oral once PRN Blood Glucose LESS THAN 70 milliGRAM(s)/deciliter  glucagon  Injectable 1 milliGRAM(s) IntraMuscular once PRN Glucose LESS THAN 70 milligrams/deciliter  ibuprofen  Tablet. 600 milliGRAM(s) Oral every 6 hours PRN mild pain (1-3) not relieved with Acetaminophen  oxyCODONE    IR 5 milliGRAM(s) Oral every 6 hours PRN Moderate Pain (4 - 6)  oxyCODONE    IR 10 milliGRAM(s) Oral every 6 hours PRN Severe Pain (7 - 10)  sodium chloride 0.9% lock flush 10 milliLiter(s) IV Push every 1 hour PRN Pre/post blood products, medications, blood draw, and to maintain line patency    Physical Exam:  Constitutional: NAD  Abdomen: Soft, nontender, nondistended, no guarding, no rebound, +bowel sounds  Extremities: no lower extremity edema or calve tenderness. SCDs in place     LABS:                        8.1    4.86  )-----------( 369      ( 04 Mar 2019 07:47 )             26.4     03-04    138  |  99  |  15  ----------------------------<  168<H>  4.8   |  28  |  0.58    Ca    9.4      04 Mar 2019 07:39  Phos  4.5     03-04  Mg     1.8     03-04    TPro  6.5  /  Alb  3.0<L>  /  TBili  0.2  /  DBili  x   /  AST  13  /  ALT  11  /  AlkPhos  86  03-04    PT/INR - ( 04 Mar 2019 07:47 )   PT: 12.4 sec;   INR: 1.09        PTT - ( 04 Mar 2019 07:47 )  PTT:29.2 sec

## 2019-03-04 NOTE — PROGRESS NOTE ADULT - PROBLEM SELECTOR PLAN 8
Family meeting with Dr. Spivey  and Dr Arias Friday. Patient and family goals in line with further chemo and surgery in future once clinically/functionally improved. Patient aware that she will need to get stronger if this is to happen. Plan is to continue conservative management of enterovaginal fistula with PPN- ultimate transition to oral diet. Patient agrees for discharge to [facility] once clinically stable.

## 2019-03-04 NOTE — PROGRESS NOTE ADULT - PROBLEM SELECTOR PLAN 6
Presented with fecal-urinary incontinence- MRI findings of enterovaginal fistula.   - Seen by Surgery team with no plans for acute intervention at this time  - Conservative management at this time, with PPN- plan to transition to oral diet once remains clinically stable.

## 2019-03-04 NOTE — CHART NOTE - NSCHARTNOTEFT_GEN_A_CORE
Admitting Diagnosis:   Patient is a 59y old  Female who presents with a chief complaint of Enterovaginal fistula. (2019 14:45)      PAST MEDICAL & SURGICAL HISTORY:        Current Nutrition Order: NPO  TPN via PICC:  230g Dex, 60g AA, 50g 20% lipids 4x/week (1522kcal, 1.25g pro/kg IBW, GIR 2.5g)    PO Intake: Good (%) [   ]  Fair (50-75%) [   ] Poor (<25%) [   ] N/A    GI Issues:   LLQ colostomy  Formed brown stool  Reports no output this am  Denies N/V     Pain:  No pain reported     Skin Integrity:  IAD at perirenal area  Colostomy in place      Labs:       138  |  99  |  15  ----------------------------<  168<H>  4.8   |  28  |  0.58    Ca    9.4      04 Mar 2019 07:39  Phos  4.5     -  Mg     1.8     -    TPro  6.5  /  Alb  3.0<L>  /  TBili  0.2  /  DBili  x   /  AST  13  /  ALT  11  /  AlkPhos  86  -    CAPILLARY BLOOD GLUCOSE      POCT Blood Glucose.: 192 mg/dL (04 Mar 2019 10:57)  POCT Blood Glucose.: 186 mg/dL (04 Mar 2019 06:00)  POCT Blood Glucose.: 229 mg/dL (03 Mar 2019 21:04)  POCT Blood Glucose.: 141 mg/dL (03 Mar 2019 16:33)      Medications:  MEDICATIONS  (STANDING):  chlorhexidine 2% Cloths 1 Application(s) Topical <User Schedule>  dextrose 5%. 1000 milliLiter(s) (50 mL/Hr) IV Continuous <Continuous>  dextrose 50% Injectable 12.5 Gram(s) IV Push once  dextrose 50% Injectable 25 Gram(s) IV Push once  dextrose 50% Injectable 25 Gram(s) IV Push once  enoxaparin Injectable 40 milliGRAM(s) SubCutaneous daily  escitalopram 10 milliGRAM(s) Oral at bedtime  fat emulsion (Plant Based) 20% Infusion 0.81 Gm/kG/Day (20.8 mL/Hr) IV Continuous <Continuous>  insulin lispro (HumaLOG) corrective regimen sliding scale   SubCutaneous Before meals and at bedtime  lactated ringers. 1000 milliLiter(s) (100 mL/Hr) IV Continuous <Continuous>  nystatin    Suspension 653108 Unit(s) Oral four times a day  pantoprazole  Injectable 40 milliGRAM(s) IV Push every 24 hours  Parenteral Nutrition - Adult 1 Each (83 mL/Hr) TPN Continuous <Continuous>  Parenteral Nutrition - Adult 1 Each (83 mL/Hr) TPN Continuous <Continuous>  trimethoprim / sulfamethoxazole IVPB 160 milliGRAM(s) IV Intermittent every 12 hours    MEDICATIONS  (PRN):  acetaminophen   Tablet .. 650 milliGRAM(s) Oral every 6 hours PRN Temp greater or equal to 38C (100.4F), Mild Pain (1 - 3)  dextrose 40% Gel 15 Gram(s) Oral once PRN Blood Glucose LESS THAN 70 milliGRAM(s)/deciliter  glucagon  Injectable 1 milliGRAM(s) IntraMuscular once PRN Glucose LESS THAN 70 milligrams/deciliter  ibuprofen  Tablet. 600 milliGRAM(s) Oral every 6 hours PRN mild pain (1-3) not relieved with Acetaminophen  oxyCODONE    IR 5 milliGRAM(s) Oral every 6 hours PRN Moderate Pain (4 - 6)  oxyCODONE    IR 10 milliGRAM(s) Oral every 6 hours PRN Severe Pain (7 - 10)  sodium chloride 0.9% lock flush 10 milliLiter(s) IV Push every 1 hour PRN Pre/post blood products, medications, blood draw, and to maintain line patency      Weight: 136lbs  Height: 5'1", IBW 105lbs+/-10%, %%, BMI 25.7  Daily     Weight Change:   Denies any recent wt changes. Good appetite PTA.  Please trend weights while on TPN     Estimated energy needs:   IBW used for calculations as pt >120% of IBW   Nutrient needs based on [facility] standards of care for maintenance in adults.   Needs adjusted 2/2 catabolic illness, chemo treatments. Fluids adjusted 2/2 colostomy.  1428-1666kcal/day (30-35kcal/kg)  57-67g pro/day (1-1.2g/kg)  1428-1666ml fluid/day (30-35ml/kg)    Subjective:   58yo F with stage IV endometrial adenocarcinoma s/p staging surgery '18 and 1 round of chemotherapy 3 wks ago. Was planned to complete 2nd cycle of chemo today . Admitted for management of symptomatic rectovaginal and enterovaginal fistulas. Per surgery, no surgical intervention at this time. Ordered for octreotide. PPN ordered for nutrition support (), PICC line placed (). Pt currently ordered for  230g Dex, 60g AA, 50g 20% lipids 4x/week (1522kcal, 1.25g pro/kg IBW, GIR 2.5g). POCT B, 229, 141, 184mg/dL; lytes WNL; Tmg/dL (3/4), 383mg/dL (3/3). Please trend TG 2x/week and monitor need for 20g 20% lipids 4x/week vs 50g. Pt seen resting in bed. Remains NPO. Denies N/V. Denies any output this am. Pt remains understanding of current NPO status with advancement pending medical course. TPN order appropriate. RD to follow.    Previous Nutrition Diagnosis:  increased nutrient needs RT increased demand for kcal/pro AEB catabolic illness, s/p chemo treatment and possible fistula losses.   Active [ x  ]  Resolved [   ]    If resolved, new PES:     Goal:  Pt to consistently meet % of estimated needs via most appropriate route    Recommendations:  1) Continue w/ current PN order: 230g Dex, 60g AA, 50g 20% lipids 4x/week (1522kcal, 1.25g pro/kg IBW, GIR 2.5g).   Recommend checking TG 2x/week. Check Mg, Phos, K daily and POC BG Q6hrs. Trend daily weights. Fluids and lytes per MD discretion.   2)  If TG >400mg/dL, consider switching to 20g 20% lipids vs 50g 4x/week.  3) When PO is deemed medically feasible recommend advancing to Clear Liquid diet w/ EnsureClears TID (720kcal, 24g pro)    Education:   pt understanding of meeting needs via TPN for time being.     Risk Level: High [ x  ] Moderate [   ] Low [   ].

## 2019-03-04 NOTE — PROGRESS NOTE ADULT - ASSESSMENT
58yo HD11 with stage IV endometrial adenocarcinoma s/p staging surgery '18 and 1 round of chemotherapy 3 wks prior admitted for management of symptomatic rectovaginal and enterovaginal fistulas. Pt is hemodynamically stable, no stool output from vagina today.      1. PAIN - Motrin, Tylenol, Oxycodone prn   2. Cardio: vitals per routine   3. Pulm: no issues   4. FEN/GI - continue with TPN daily- lipids 4x/week- next 3/6/19. NPO, IVH, replete electrolytes PRN. Continue daily Octreotide. Appreciate nutrition recs  5.  - joseph in place, no leakage of stool today, general surgery following for management of rectovaginal/enterovaginal fistulas- no surgery at this time. Recs appreciated  6. Endocrine- ISS while patient remains NPO, holding home metformin at this time  7. ID: ESBL e-coli. continue IV Bactrim BID.  s/p IV Ceftriaxone 1g daily (2/28-3/1)   Oral thrush- Nystatin oral wash- Pt understands that she needs to wash then spit solution out  8. VTE prophylaxis - SCDs, ambulate as tolerated. Lovenox 40qd  9. PT recommends LITO for Pt but Pt is refusing   10. Daily labs   11. Palliative following 58yo HD11 with stage IV endometrial adenocarcinoma s/p staging surgery '18 and 1 round of chemotherapy 3 wks prior admitted for management of symptomatic rectovaginal and enterovaginal fistulas. Pt is hemodynamically stable, no stool output from vagina today.      1. PAIN - Motrin, Tylenol, Oxycodone prn   For depressed mood-Continue lexapro   2. Cardio: vitals per routine   3. Pulm: no issues   4. FEN/GI - continue with TPN daily- lipids 4x/week- next 3/6/19. NPO, IVH, replete electrolytes PRN. Continue daily Octreotide. Appreciate nutrition recs  5.  - joseph in place, no leakage of stool today, general surgery following for management of rectovaginal/enterovaginal fistulas- no surgery at this time. Recs appreciated  6. Endocrine- ISS while patient remains NPO, holding home metformin at this time  7. ID: ESBL e-coli. continue IV Bactrim BID.  s/p IV Ceftriaxone 1g daily (2/28-3/1)   Oral thrush- Nystatin oral wash- Pt understands that she needs to wash then spit solution out  8. VTE prophylaxis - SCDs, ambulate as tolerated. Lovenox 40qd  9. Daily ambulation encouraged. PT following    10. Daily labs   11. Palliative following

## 2019-03-04 NOTE — PROGRESS NOTE ADULT - PROBLEM SELECTOR PLAN 3
Likely 2/2 yeast infection from immunocompromised state.   - Symptoms of itchiness has improved over the past few days.   - Continue Nystatin topical powder applied to vagina.    - May consider witch hazel for further symptom relief.

## 2019-03-04 NOTE — PROGRESS NOTE ADULT - SUBJECTIVE AND OBJECTIVE BOX
JOHN BAKER          MRN-[id number]            (1960)      HPI:  A 60 y/o G0 with PMH significant for Breast Cancer, Diabetes Mellitus, Endometrial Cancer stage 3, s/p exploratory laparotomy, enterolysis, SHAMIR, BSO, pelvic lymphadenectomy, low anterior resection mobilization of splenic flexure, end colostomy on 18 now with rectovaginal fistula. She was admitted to [facility] one week ago for treatment of UTI, and now transferred to [facility] for further care. MRI showed enterovaginal and rectovaginal fistula. Surgery team following patient- no surgical intervention offered. Per PT evaluation patient to be discharged to [facility]. Palliative consulted to evaluated patient for symptoms- pain.     Cancer History: G0, endometrial cancer dx in 2018; h/o breast cancer in  s/p chemo and radiation with left breast lumpectomy She has undergone one round of chemotherapy approximately 3 weeks ago; to complete second cycle of chemotherapy this Monday, .     Subjective  Patient seen and evaluated at bedside today. Appears comfortable and in no acute distress. Denies any current pain symptoms, reports improvement in vaginal pain and itchiness. Received 1 dose of oral Tylenol yesterday which improved her pain symptoms. Denies any tingling "pins and needles" in bilateral feet today. Receiving PPN. Per discussion with primary team, patient to remain inpatient for conservative management of enterovaginal fistula with trial on PPN with ultimate plan to advance diet if appears clinically stable. To continue PT As tolerated while inpatient- tentative discharge plan for [facility] when ready.     ROS:  Denies nausea, vomiting. Decreased pain, decreased vaginal itching, no fecal incontinence noted                     Dyspnea (Alfredo 0-10):   0   -                   N/V (Y/N):             No                 Secretions (Y/N) :        No         Agitation(Y/N):  No, calm   Pain (Y/N):   No pain at present time, intermittent mild vaginal pain well controlled with Tylenol.   -Provocation/Palliation:   -Quality/Quantity: Sharp, Burning   -Radiating: Nonradiating   -Severity: 0/10 presently   -Timing/Frequency: Intermittently occurring   -Impact on ADLs: loss of autonomy     Allergies    Allergy Status Unknown    Intolerances    Opiate Naive (Y/N): Y  -iStop reviewed (Y/N): Y on initial consultation (Ref#:  [id number] )  Alprazolam 0.25m, Lorazepam 0.5mg     Medications: Reviewed 	     MEDICATIONS  (STANDING):  chlorhexidine 2% Cloths 1 Application(s) Topical <User Schedule>  dextrose 5%. 1000 milliLiter(s) (50 mL/Hr) IV Continuous <Continuous>  dextrose 50% Injectable 12.5 Gram(s) IV Push once  dextrose 50% Injectable 25 Gram(s) IV Push once  dextrose 50% Injectable 25 Gram(s) IV Push once  enoxaparin Injectable 40 milliGRAM(s) SubCutaneous daily  escitalopram 10 milliGRAM(s) Oral at bedtime  fat emulsion (Plant Based) 20% Infusion 0.81 Gm/kG/Day (20.8 mL/Hr) IV Continuous <Continuous>  insulin lispro (HumaLOG) corrective regimen sliding scale   SubCutaneous Before meals and at bedtime  lactated ringers. 1000 milliLiter(s) (100 mL/Hr) IV Continuous <Continuous>  nystatin    Suspension 540158 Unit(s) Oral four times a day  pantoprazole  Injectable 40 milliGRAM(s) IV Push every 24 hours  Parenteral Nutrition - Adult 1 Each (83 mL/Hr) TPN Continuous <Continuous>  Parenteral Nutrition - Adult 1 Each (83 mL/Hr) TPN Continuous <Continuous>  trimethoprim / sulfamethoxazole IVPB 160 milliGRAM(s) IV Intermittent every 12 hours    MEDICATIONS  (PRN):  acetaminophen   Tablet .. 650 milliGRAM(s) Oral every 6 hours PRN Temp greater or equal to 38C (100.4F), Mild Pain (1 - 3)  dextrose 40% Gel 15 Gram(s) Oral once PRN Blood Glucose LESS THAN 70 milliGRAM(s)/deciliter  glucagon  Injectable 1 milliGRAM(s) IntraMuscular once PRN Glucose LESS THAN 70 milligrams/deciliter  ibuprofen  Tablet. 600 milliGRAM(s) Oral every 6 hours PRN mild pain (1-3) not relieved with Acetaminophen  oxyCODONE    IR 5 milliGRAM(s) Oral every 6 hours PRN Moderate Pain (4 - 6)  oxyCODONE    IR 10 milliGRAM(s) Oral every 6 hours PRN Severe Pain (7 - 10)  sodium chloride 0.9% lock flush 10 milliLiter(s) IV Push every 1 hour PRN Pre/post blood products, medications, blood draw, and to maintain line patency    Lab Results: reviewed       CBC:                        8.1    4.86  )-----------( 369      ( 04 Mar 2019 07:47 )             26.4     CMP:    -    138  |  99  |  15  ----------------------------<  168<H>  4.8   |  28  |  0.58    Ca    9.4      04 Mar 2019 07:39  Phos  4.5     03-04  Mg     1.8     -04    TPro  6.5  /  Alb  3.0<L>  /  TBili  0.2  /  DBili  x   /  AST  13  /  ALT  11  /  AlkPhos  86  03-04    PT/INR - ( 04 Mar 2019 07:47 )   PT: 12.4 sec;   INR: 1.09       PTT - ( 04 Mar 2019 07:47 )  PTT:29.2 sec     Culture - Urine (19 @ 09:23)    -  Amikacin: S <=8    -  Ampicillin: R >16 These ampicillin results predict results for amoxicillin    -  Ampicillin/Sulbactam: R >16/8    -  Cefazolin: R >16 For uncomplicated UTI with K. pneumoniae, E. coli, or P. mirablis: RAJIV <=16 is sensitive and RAJIV >=32 is resistant. This also predicts results for oral agents cefaclor, cefdinir, cefpodoxime, cefprozil, cefuroxime axetil, cephalexin and locarbef for uncomplicated UTI. Note that some isolates may be susceptible to these agents while testing resistant to cefazolin.    -  Ceftriaxone: R >32 Enterobacter, Citrobacter, and Serratia may develop resistance during prolonged therapy    -  Ciprofloxacin: R >2    -  Gentamicin: R >8    -  Meropenem: S 0.064    -  Nitrofurantoin: R >64 Should not be used to treat pyelonephritis    -  Piperacillin/Tazobactam: R <=8    -  Tobramycin: R >8    -  Trimethoprim/Sulfamethoxazole: S <=0.5/9.5    Specimen Source: .Urine urine culture    Culture Results:   >100,000 CFU/ml Escherichia coli ESBL  Result called to and read back byKWESI White RN  2019 10:45:41    Organism Identification: Escherichia coli ESBL  Escherichia coli ESBL    Organism: Escherichia coli ESBL    Organism: Escherichia coli ESBL    Method Type: RAJIV    Method Type: ETEST               Imaging:  None new to review     PEx:  T(C): 36.3 (19 @ 12:30), Max: 36.4 (19 @ 08:38)  HR: 72 (19 @ 12:30) (70 - 78)  BP: 124/86 (19 @ 12:30) (109/73 - 156/106)  RR: 17 (19 @ 12:30) (16 - 18)  SpO2: 97% (19 @ 12:30) (95% - 100%)  Wt(kg): -- 61.7   Daily     Daily   CAPILLARY BLOOD GLUCOSE    POCT Blood Glucose.: 192 mg/dL (04 Mar 2019 10:57)    I&O's Summary    03 Mar 2019 07:  -  04 Mar 2019 07:00  --------------------------------------------------------  IN: 4399.2 mL / OUT: 4701 mL / NET: -301.8 mL    04 Mar 2019 07:  -  04 Mar 2019 14:26  --------------------------------------------------------  IN: 732 mL / OUT: 2300 mL / NET: -1568 mL    General: Alert, oriented x3, communicative, currently at her baseline, appears in no apparent distress  HEENT:  Atraumatic, normocephalic, decreasing white film on tongue , moist oral mucosa   Neck: Neck supple   CVS: S1, S2 + regular rate and rhythm   Resp: Bilateral air entry, no wheezing, no rhonchi   GI: Abdomen soft, nontender, nondistended, bowel sounds +, Colostomy + stoma pink, + watery, brown stools   :  [place] in place , ++ yellowish colored urine output   Musc: Able to move all extremities , able to ambulate with assistance   Neuro: alert and oriented x3, follows commands   Psych:  Calm, and cooperative, no agitation    Skin: Intact, Normal, Pallor +  No edema    Lymph: No LAD  Preadmit Karnofsky:  60%           Current Karnofsky:  60%  Cachexia (Y/N): No  BMI: 25.7    Advanced Directives:     Full Code    Decision maker: At this time, patient is currently capacity make all health care related decisions for herself.  Legal surrogate: Patient has officially designated her sister, Esha Hyatt as her primary Health Care Proxy, and Ashlyn Baker as her secondary HCP. Documentation filled and placed in chart. Contact #[phone], #[phone]  	      AGENCY CHOICE DISCUSSED:     LITO            REFERRALS	        Palliative Med        Unit SW/Case Mgmt       Music Therapy        Nutrition       PT/OT

## 2019-03-04 NOTE — PROGRESS NOTE ADULT - SUBJECTIVE AND OBJECTIVE BOX
Pt evaluated at bedside, reports feeling well and energetic this morning.  She denies fever/chills, HA, dizziness, SOB, CP, palpitations, n/v, abdominal pain. She walked with PT yesterday.    T(C): 35.6 (03-04-19 @ 05:26), Max: 35.6 (03-04-19 @ 05:26)  HR: 72 (03-04-19 @ 05:26) (72 - 72)  BP: 109/73 (03-04-19 @ 05:26) (109/73 - 109/73)  RR: 17 (03-04-19 @ 05:26) (17 - 17)  SpO2: 95% (03-04-19 @ 05:26) (95% - 95%)  GA: NAD, A+OX3  Abd: +BS, soft, NTND, no rebound or guarding, LLQ ostomy w/ green stool and gas  no CVAT  : nystatin powder on perineum, no erythema, scant amount of green liquid noted per vagina on chux  Extrem: no calf tenderness, SCDs in place    03-03 @ 07:01  -  03-04 @ 07:00  --------------------------------------------------------  IN: 4399.2 mL / OUT: 4700 mL / NET: -300.8 mL                        8.1    4.86  )-----------( 369      ( 04 Mar 2019 07:47 )             26.4     03-04    138  |  99  |  15  ----------------------------<  168<H>  4.8   |  28  |  0.58    Ca    9.4      04 Mar 2019 07:39  Phos  4.5     03-04  Mg     1.8     03-04    TPro  6.5  /  Alb  3.0<L>  /  TBili  0.2  /  DBili  x   /  AST  13  /  ALT  11  /  AlkPhos  86  03-04    MEDICATIONS  (STANDING):  chlorhexidine 2% Cloths 1 Application(s) Topical <User Schedule>  dextrose 5%. 1000 milliLiter(s) (50 mL/Hr) IV Continuous <Continuous>  dextrose 50% Injectable 12.5 Gram(s) IV Push once  dextrose 50% Injectable 25 Gram(s) IV Push once  dextrose 50% Injectable 25 Gram(s) IV Push once  enoxaparin Injectable 40 milliGRAM(s) SubCutaneous daily  escitalopram 10 milliGRAM(s) Oral at bedtime  insulin lispro (HumaLOG) corrective regimen sliding scale   SubCutaneous Before meals and at bedtime  lactated ringers. 1000 milliLiter(s) (100 mL/Hr) IV Continuous <Continuous>  nystatin    Suspension 677868 Unit(s) Oral four times a day  pantoprazole  Injectable 40 milliGRAM(s) IV Push every 24 hours  Parenteral Nutrition - Adult 1 Each (83 mL/Hr) TPN Continuous <Continuous>  trimethoprim / sulfamethoxazole IVPB 160 milliGRAM(s) IV Intermittent every 12 hours    MEDICATIONS  (PRN):  acetaminophen   Tablet .. 650 milliGRAM(s) Oral every 6 hours PRN Temp greater or equal to 38C (100.4F), Mild Pain (1 - 3)  dextrose 40% Gel 15 Gram(s) Oral once PRN Blood Glucose LESS THAN 70 milliGRAM(s)/deciliter  glucagon  Injectable 1 milliGRAM(s) IntraMuscular once PRN Glucose LESS THAN 70 milligrams/deciliter  ibuprofen  Tablet. 600 milliGRAM(s) Oral every 6 hours PRN mild pain (1-3) not relieved with Acetaminophen  oxyCODONE    IR 5 milliGRAM(s) Oral every 6 hours PRN Moderate Pain (4 - 6)  oxyCODONE    IR 10 milliGRAM(s) Oral every 6 hours PRN Severe Pain (7 - 10)  sodium chloride 0.9% lock flush 10 milliLiter(s) IV Push every 1 hour PRN Pre/post blood products, medications, blood draw, and to maintain line patency

## 2019-03-05 LAB
ALBUMIN SERPL ELPH-MCNC: 3.3 G/DL — SIGNIFICANT CHANGE UP (ref 3.3–5)
ALP SERPL-CCNC: 104 U/L — SIGNIFICANT CHANGE UP (ref 40–120)
ALT FLD-CCNC: 13 U/L — SIGNIFICANT CHANGE UP (ref 10–45)
ANION GAP SERPL CALC-SCNC: 12 MMOL/L — SIGNIFICANT CHANGE UP (ref 5–17)
AST SERPL-CCNC: 20 U/L — SIGNIFICANT CHANGE UP (ref 10–40)
BASOPHILS # BLD AUTO: 0.02 K/UL — SIGNIFICANT CHANGE UP (ref 0–0.2)
BASOPHILS NFR BLD AUTO: 0.3 % — SIGNIFICANT CHANGE UP (ref 0–2)
BILIRUB SERPL-MCNC: <0.2 MG/DL — SIGNIFICANT CHANGE UP (ref 0.2–1.2)
BUN SERPL-MCNC: 19 MG/DL — SIGNIFICANT CHANGE UP (ref 7–23)
CALCIUM SERPL-MCNC: 9.8 MG/DL — SIGNIFICANT CHANGE UP (ref 8.4–10.5)
CHLORIDE SERPL-SCNC: 100 MMOL/L — SIGNIFICANT CHANGE UP (ref 96–108)
CHOLEST SERPL-MCNC: 125 MG/DL — SIGNIFICANT CHANGE UP (ref 10–199)
CO2 SERPL-SCNC: 27 MMOL/L — SIGNIFICANT CHANGE UP (ref 22–31)
CREAT SERPL-MCNC: 0.64 MG/DL — SIGNIFICANT CHANGE UP (ref 0.5–1.3)
EOSINOPHIL # BLD AUTO: 0.15 K/UL — SIGNIFICANT CHANGE UP (ref 0–0.5)
EOSINOPHIL NFR BLD AUTO: 2 % — SIGNIFICANT CHANGE UP (ref 0–6)
GLUCOSE BLDC GLUCOMTR-MCNC: 127 MG/DL — HIGH (ref 70–99)
GLUCOSE BLDC GLUCOMTR-MCNC: 131 MG/DL — HIGH (ref 70–99)
GLUCOSE BLDC GLUCOMTR-MCNC: 158 MG/DL — HIGH (ref 70–99)
GLUCOSE BLDC GLUCOMTR-MCNC: 160 MG/DL — HIGH (ref 70–99)
GLUCOSE SERPL-MCNC: 82 MG/DL — SIGNIFICANT CHANGE UP (ref 70–99)
HCT VFR BLD CALC: 28.5 % — LOW (ref 34.5–45)
HDLC SERPL-MCNC: 27 MG/DL — LOW
HGB BLD-MCNC: 9.1 G/DL — LOW (ref 11.5–15.5)
IMM GRANULOCYTES NFR BLD AUTO: 0.7 % — SIGNIFICANT CHANGE UP (ref 0–1.5)
LIPID PNL WITH DIRECT LDL SERPL: 23 MG/DL — SIGNIFICANT CHANGE UP
LYMPHOCYTES # BLD AUTO: 1.64 K/UL — SIGNIFICANT CHANGE UP (ref 1–3.3)
LYMPHOCYTES # BLD AUTO: 21.8 % — SIGNIFICANT CHANGE UP (ref 13–44)
MAGNESIUM SERPL-MCNC: 1.9 MG/DL — SIGNIFICANT CHANGE UP (ref 1.6–2.6)
MCHC RBC-ENTMCNC: 30.1 PG — SIGNIFICANT CHANGE UP (ref 27–34)
MCHC RBC-ENTMCNC: 31.9 GM/DL — LOW (ref 32–36)
MCV RBC AUTO: 94.4 FL — SIGNIFICANT CHANGE UP (ref 80–100)
MONOCYTES # BLD AUTO: 0.72 K/UL — SIGNIFICANT CHANGE UP (ref 0–0.9)
MONOCYTES NFR BLD AUTO: 9.6 % — SIGNIFICANT CHANGE UP (ref 2–14)
NEUTROPHILS # BLD AUTO: 4.94 K/UL — SIGNIFICANT CHANGE UP (ref 1.8–7.4)
NEUTROPHILS NFR BLD AUTO: 65.6 % — SIGNIFICANT CHANGE UP (ref 43–77)
NRBC # BLD: 0 /100 WBCS — SIGNIFICANT CHANGE UP (ref 0–0)
PHOSPHATE SERPL-MCNC: 4 MG/DL — SIGNIFICANT CHANGE UP (ref 2.5–4.5)
PLATELET # BLD AUTO: 462 K/UL — HIGH (ref 150–400)
POTASSIUM SERPL-MCNC: 4.7 MMOL/L — SIGNIFICANT CHANGE UP (ref 3.5–5.3)
POTASSIUM SERPL-SCNC: 4.7 MMOL/L — SIGNIFICANT CHANGE UP (ref 3.5–5.3)
PROT SERPL-MCNC: 6.5 G/DL — SIGNIFICANT CHANGE UP (ref 6–8.3)
RBC # BLD: 3.02 M/UL — LOW (ref 3.8–5.2)
RBC # FLD: 17 % — HIGH (ref 10.3–14.5)
SODIUM SERPL-SCNC: 139 MMOL/L — SIGNIFICANT CHANGE UP (ref 135–145)
TOTAL CHOLESTEROL/HDL RATIO MEASUREMENT: 4.6 RATIO — SIGNIFICANT CHANGE UP (ref 3.3–7.1)
TRIGL SERPL-MCNC: 374 MG/DL — HIGH (ref 10–149)
WBC # BLD: 7.52 K/UL — SIGNIFICANT CHANGE UP (ref 3.8–10.5)
WBC # FLD AUTO: 7.52 K/UL — SIGNIFICANT CHANGE UP (ref 3.8–10.5)

## 2019-03-05 PROCEDURE — 99233 SBSQ HOSP IP/OBS HIGH 50: CPT

## 2019-03-05 PROCEDURE — 99231 SBSQ HOSP IP/OBS SF/LOW 25: CPT

## 2019-03-05 RX ORDER — ELECTROLYTE SOLUTION,INJ
1 VIAL (ML) INTRAVENOUS
Qty: 0 | Refills: 0 | Status: DISCONTINUED | OUTPATIENT
Start: 2019-03-05 | End: 2019-03-05

## 2019-03-05 RX ADMIN — Medication 260 MILLIGRAM(S): at 06:00

## 2019-03-05 RX ADMIN — Medication 1 EACH: at 17:40

## 2019-03-05 RX ADMIN — CHLORHEXIDINE GLUCONATE 1 APPLICATION(S): 213 SOLUTION TOPICAL at 06:25

## 2019-03-05 RX ADMIN — Medication 260 MILLIGRAM(S): at 17:41

## 2019-03-05 RX ADMIN — Medication 2: at 08:08

## 2019-03-05 RX ADMIN — Medication 500000 UNIT(S): at 05:43

## 2019-03-05 RX ADMIN — Medication 500000 UNIT(S): at 11:57

## 2019-03-05 RX ADMIN — ENOXAPARIN SODIUM 40 MILLIGRAM(S): 100 INJECTION SUBCUTANEOUS at 11:57

## 2019-03-05 RX ADMIN — SODIUM CHLORIDE 100 MILLILITER(S): 9 INJECTION, SOLUTION INTRAVENOUS at 16:08

## 2019-03-05 RX ADMIN — PANTOPRAZOLE SODIUM 40 MILLIGRAM(S): 20 TABLET, DELAYED RELEASE ORAL at 06:25

## 2019-03-05 RX ADMIN — Medication 2: at 17:40

## 2019-03-05 RX ADMIN — Medication 500000 UNIT(S): at 17:41

## 2019-03-05 NOTE — PROGRESS NOTE ADULT - PROBLEM SELECTOR PROBLEM 7
Advanced care planning/counseling discussion
Endometrial cancer
Endometrial cancer
Palliative care by specialist
Palliative care by specialist
Endometrial cancer

## 2019-03-05 NOTE — PROGRESS NOTE ADULT - PROBLEM SELECTOR PROBLEM 8
Advanced care planning/counseling discussion
Palliative care by specialist

## 2019-03-05 NOTE — PROGRESS NOTE ADULT - PROBLEM SELECTOR PROBLEM 1
UTI (urinary tract infection) due to Enterococcus
UTI (urinary tract infection) due to Enterococcus
Vaginal pain
UTI (urinary tract infection) due to Enterococcus

## 2019-03-05 NOTE — PROGRESS NOTE ADULT - SUBJECTIVE AND OBJECTIVE BOX
Pt evaluated at bedside, reports feeling "okay".  She denies fever/chills, HA, dizziness, SOB, CP, palpitations, n/v, abdominal pain. She did not get out of bed w/ PT yesterday.    T(C): 36.4 (03-05-19 @ 05:44), Max: 36.4 (03-05-19 @ 05:44)  HR: 80 (03-05-19 @ 05:44) (75 - 80)  BP: 113/78 (03-05-19 @ 05:44) (112/76 - 113/78)  RR: 16 (03-05-19 @ 05:44) (16 - 16)  SpO2: 95% (03-05-19 @ 05:44) (95% - 97%)  GA: NAD, A+OX3  Abd: +BS, soft, NTND, no rebound or guarding, LLQ ostomy w/ green stool and gas  no CVAT  : nystatin powder on perineum, no erythema, scant amount of green liquid noted per vagina on chux  Extrem: no calf tenderness, SCDs in place    03-04 @ 07:01  -  03-05 @ 06:53  --------------------------------------------------------  IN: 4190.2 mL / OUT: 5775 mL / NET: -1584.8 mL                        8.1    4.86  )-----------( 369      ( 04 Mar 2019 07:47 )             26.4     03-04    138  |  99  |  15  ----------------------------<  168<H>  4.8   |  28  |  0.58    Ca    9.4      04 Mar 2019 07:39  Phos  4.5     03-04  Mg     1.8     03-04    TPro  6.5  /  Alb  3.0<L>  /  TBili  0.2  /  DBili  x   /  AST  13  /  ALT  11  /  AlkPhos  86  03-04    MEDICATIONS  (STANDING):  chlorhexidine 2% Cloths 1 Application(s) Topical <User Schedule>  dextrose 5%. 1000 milliLiter(s) (50 mL/Hr) IV Continuous <Continuous>  dextrose 50% Injectable 12.5 Gram(s) IV Push once  dextrose 50% Injectable 25 Gram(s) IV Push once  dextrose 50% Injectable 25 Gram(s) IV Push once  enoxaparin Injectable 40 milliGRAM(s) SubCutaneous daily  escitalopram 10 milliGRAM(s) Oral at bedtime  fat emulsion (Plant Based) 20% Infusion 0.81 Gm/kG/Day (20.8 mL/Hr) IV Continuous <Continuous>  insulin lispro (HumaLOG) corrective regimen sliding scale   SubCutaneous Before meals and at bedtime  lactated ringers. 1000 milliLiter(s) (100 mL/Hr) IV Continuous <Continuous>  nystatin    Suspension 259931 Unit(s) Oral four times a day  pantoprazole  Injectable 40 milliGRAM(s) IV Push every 24 hours  Parenteral Nutrition - Adult 1 Each (83 mL/Hr) TPN Continuous <Continuous>  trimethoprim / sulfamethoxazole IVPB 160 milliGRAM(s) IV Intermittent every 12 hours    MEDICATIONS  (PRN):  acetaminophen   Tablet .. 650 milliGRAM(s) Oral every 6 hours PRN Temp greater or equal to 38C (100.4F), Mild Pain (1 - 3)  dextrose 40% Gel 15 Gram(s) Oral once PRN Blood Glucose LESS THAN 70 milliGRAM(s)/deciliter  glucagon  Injectable 1 milliGRAM(s) IntraMuscular once PRN Glucose LESS THAN 70 milligrams/deciliter  ibuprofen  Tablet. 600 milliGRAM(s) Oral every 6 hours PRN mild pain (1-3) not relieved with Acetaminophen  oxyCODONE    IR 5 milliGRAM(s) Oral every 6 hours PRN Moderate Pain (4 - 6)  oxyCODONE    IR 10 milliGRAM(s) Oral every 6 hours PRN Severe Pain (7 - 10)  sodium chloride 0.9% lock flush 10 milliLiter(s) IV Push every 1 hour PRN Pre/post blood products, medications, blood draw, and to maintain line patency

## 2019-03-05 NOTE — PROGRESS NOTE ADULT - PROBLEM SELECTOR PLAN 9
Family meeting with Dr. Spivey  and Dr Arias yesterday. Pt and family goals to include further chemo and surgery in future. Pt is aware that she will need to get stronger if this is to happen. Discussed benefits of [facility] vs home with home PT. She is now agreeable to [facility]. Choices may be limited given her isolation status and PPN. YASMIN aware. Twyla MOLINA aware
Support provided to patient and family. Patient to have access to supportive services during rest of hospital stay as the patient/family deemed necessary ie. Chaplaincy, Massage therapy, Music therapy, Patient and family supportive services, Palliative SW, etc.    As discussed during the palliative IDT meeting and as identified during the patients PSSA screening the patient would benefit from: supportive care.
Support provided to patient and family. Patient to have access to supportive services during rest of hospital stay as the patient/family deemed necessary ie. Chaplaincy, Massage therapy, Music therapy, Patient and family supportive services, Palliative SW, etc.    As discussed during the palliative IDT meeting and as identified during the patients PSSA screening the patient would benefit from: supportive care.

## 2019-03-05 NOTE — PROGRESS NOTE ADULT - PROBLEM SELECTOR PROBLEM 9
Advanced care planning/counseling discussion
Palliative care by specialist
Palliative care by specialist

## 2019-03-05 NOTE — PROGRESS NOTE ADULT - PROBLEM SELECTOR PLAN 5
- Resolving, likely 2/2 immunocompromised state.  - Decreased whitish coating over tongue.   - Continue Nystatin oral solution for total of 10 days (3/6/19).  - Reinforced need to spit out and not to swallow
- Resolving, likely 2/2 immunocompromised state.  - Decreased whitish coating over tongue.   - Continue Nystatin oral solution. Reinforced need to spit out and not to swallow
Endometrial cancer dx in 7/2018; h/o breast cancer in 2009 s/p chemo and radiation with left breast lumpectomy She has undergone one round of chemotherapy approximately 3 weeks ago; was to complete second cycle of chemotherapy Monday, 2/25.  - Continue plan per primary/Oncology teams
Presented with fecal-urinary incontinence- MRI findings suggestive of enterovaginal fistula.   -Seen by Surgery team with no plans for acute intervention at this time
Presented with fecal-urinary incontinence- MRI findings suggestive of enterovaginal fistula.   -Seen by Surgery team with no plans for acute intervention at this time
-resolving, likely 2/2 immunocompromised state.  - continue Nystatin oral solution. Reinforced need to spit out and not to swallow

## 2019-03-05 NOTE — PROGRESS NOTE ADULT - PROBLEM SELECTOR PLAN 2
Likely 2/2 UTI/Cystitis c/b Enterovaginal Fistula.   Improved, denies vaginal pain now, occurs intermittently at 2-3/10.    - Received 1 doses of Tylenol in the last 24 hours.   - Did not receive any rescue doses of Oxycodone.   - Continue Motrin 600mg q6h PRN mild pain.   - Continue Tylenol 650mg q6h PRN mild pain.   - Continue Oxycodone IR 5mg q6h PRN moderate pain.   - Continue Oxycodone IR 10mg q6h PRN severe pain.  - Educated patient to request pain medication as all medications PRN  - Ensure bowel regimen as needed. Consider Senna if reduced output in Colostomy bag- currently brown/watery stools.
Likely 2/2 UTI/Cystitis c/b Enterovaginal Fistula.   Improved, denies vaginal pain now.   - Received no PRN medications.   - Did not receive any rescue doses of Oxycodone.   - Continue Motrin 600mg q6h PRN mild pain.   - Continue Tylenol 650mg q6h PRN mild pain.   - Continue Oxycodone IR 5mg q6h PRN moderate pain.   - Continue Oxycodone IR 10mg q6h PRN severe pain.  - Educated patient to request pain medication as all medications PRN  - Ensure bowel regimen as needed. Consider Senna if reduced output in Colostomy bag- currently brown/watery stools.
Likely 2/2 yeast infection from immunocompromised state.   - Continue Nystatin topical powder applied to vagina.    - May consider witch hazel for further symptom relief.
Likely 2/2 yeast infection from immunocompromised state.   - Continue Nystatin topical powder applied to vagina.    - May consider witch hazel for further symptom relief.
Likely 2/2 yeast infection from immunocompromised state.   - Continue Nystatin topical powder applied to vagina.    -may consider which karen for further symptom relief.
Likely 2/2 UTI/Cystitis c/b Enterovaginal Fistula.   Denies vaginal pain now, occurs intermittently at 2-3/10.    - Received 1 doses of Tylenol in the last 24 hours.   - Did not receive any rescue doses of Oxycodone.   -Continue Motrin 600mg q6h PRN mild pain.   - Continue Tylenol 650mg q6h PRN mild pain.   - Continue Oxycodone IR 5mg q6h PRN moderate pain.   - Continue Oxycodone IR 10mg q6h PRN severe pain.  - Educated patient to request pain medication as all medications PRN  - Ensure bowel regimen as needed. Consider Senna if reduced output in Colostomy bag- currently brown/watery stools.

## 2019-03-05 NOTE — PROGRESS NOTE ADULT - PROBLEM SELECTOR PROBLEM 6
Endometrial cancer
Endometrial cancer
Enterovaginal fistula
Enterovaginal fistula
Palliative care by specialist
Enterovaginal fistula

## 2019-03-05 NOTE — PROGRESS NOTE ADULT - PROBLEM SELECTOR PLAN 8
Plan is to continue conservative management of enterovaginal fistula with PPN- ultimate transition to oral diet. Patient agrees for discharge to [facility] once clinically stable. Patient and family goals in line with further chemo and surgery in future once clinically/functionally improved.

## 2019-03-05 NOTE — PROGRESS NOTE ADULT - SUBJECTIVE AND OBJECTIVE BOX
JOHN BAKER          MRN-[id number]            (1960)      HPI:  A 60 y/o G0 with PMH significant for Breast Cancer, Diabetes Mellitus, Endometrial Cancer stage 3, s/p exploratory laparotomy, enterolysis, SHAMIR, BSO, pelvic lymphadenectomy, low anterior resection mobilization of splenic flexure, end colostomy on 18 now with rectovaginal fistula. She was admitted to [facility] one week ago for treatment of UTI, and now transferred to [facility] for further care. MRI showed enterovaginal and rectovaginal fistula. Surgery team following patient- no surgical intervention offered. Per PT evaluation patient to be discharged to [facility]. Palliative consulted to evaluated patient for symptoms- pain.     Cancer History: G0, endometrial cancer dx in 2018; h/o breast cancer in  s/p chemo and radiation with left breast lumpectomy She has undergone one round of chemotherapy approximately 3 weeks ago; to complete second cycle of chemotherapy this Monday, .     Subjective    Patient seen and evaluated at bedside today.   Alert oriented x3, communicating, at baseline.   Appears comfortable and in no acute distress.   Denies any current pain symptoms, reports improvement in vaginal pain and itchiness.   Receiving PPN. Per discussion with primary team, patient to remain inpatient for conservative management of enterovaginal fistula with trial on PPN with ultimate plan to advance diet if appears clinically stable. To continue PT As tolerated while inpatient- tentative discharge plan for [facility] when ready.   No other overnight events.     ROS:  Denies nausea, vomiting. Decreased pain, decreased vaginal itching, no fecal incontinence noted                     Dyspnea (Alfredo 0-10):   0   -                   N/V (Y/N):             No                 Secretions (Y/N) :        No         Agitation(Y/N):  No, calm   Pain (Y/N):   No pain at present time, intermittent mild vaginal pain well controlled with Tylenol.   -Provocation/Palliation:   -Quality/Quantity: Sharp, Burning   -Radiating: Nonradiating   -Severity: 0/10 presently   -Timing/Frequency: Intermittently occurring   -Impact on ADLs: loss of autonomy     Allergies    Allergy Status Unknown    Intolerances    Opiate Naive (Y/N): Y  -iStop reviewed (Y/N): Y on initial consultation (Ref#:  [id number] )  Alprazolam 0.25m, Lorazepam 0.5mg     Medications:      MEDICATIONS  (STANDING):  chlorhexidine 2% Cloths 1 Application(s) Topical <User Schedule>  dextrose 5%. 1000 milliLiter(s) (50 mL/Hr) IV Continuous <Continuous>  dextrose 50% Injectable 12.5 Gram(s) IV Push once  dextrose 50% Injectable 25 Gram(s) IV Push once  dextrose 50% Injectable 25 Gram(s) IV Push once  enoxaparin Injectable 40 milliGRAM(s) SubCutaneous daily  escitalopram 10 milliGRAM(s) Oral at bedtime  insulin lispro (HumaLOG) corrective regimen sliding scale   SubCutaneous Before meals and at bedtime  lactated ringers. 1000 milliLiter(s) (100 mL/Hr) IV Continuous <Continuous>  nystatin    Suspension 881192 Unit(s) Oral four times a day  pantoprazole  Injectable 40 milliGRAM(s) IV Push every 24 hours  Parenteral Nutrition - Adult 1 Each (83 mL/Hr) TPN Continuous <Continuous>  Parenteral Nutrition - Adult 1 Each (83 mL/Hr) TPN Continuous <Continuous>  trimethoprim / sulfamethoxazole IVPB 160 milliGRAM(s) IV Intermittent every 12 hours    MEDICATIONS  (PRN):  acetaminophen   Tablet .. 650 milliGRAM(s) Oral every 6 hours PRN Temp greater or equal to 38C (100.4F), Mild Pain (1 - 3)  dextrose 40% Gel 15 Gram(s) Oral once PRN Blood Glucose LESS THAN 70 milliGRAM(s)/deciliter  glucagon  Injectable 1 milliGRAM(s) IntraMuscular once PRN Glucose LESS THAN 70 milligrams/deciliter  ibuprofen  Tablet. 600 milliGRAM(s) Oral every 6 hours PRN mild pain (1-3) not relieved with Acetaminophen  oxyCODONE    IR 5 milliGRAM(s) Oral every 6 hours PRN Moderate Pain (4 - 6)  oxyCODONE    IR 10 milliGRAM(s) Oral every 6 hours PRN Severe Pain (7 - 10)  sodium chloride 0.9% lock flush 10 milliLiter(s) IV Push every 1 hour PRN Pre/post blood products, medications, blood draw, and to maintain line patency    Lab Results:     CBC:                        9.1    7.52  )-----------( 462      ( 05 Mar 2019 09:33 )             28.5     CMP:        139  |  100  |  19  ----------------------------<  82  4.7   |  27  |  0.64    Ca    9.8      05 Mar 2019 09:32  Phos  4.0       Mg     1.9         TPro  6.5  /  Alb  3.3  /  TBili  <0.2  /  DBili  x   /  AST  20  /  ALT  13  /  AlkPhos  104      PT/INR - ( 04 Mar 2019 07:47 )   PT: 12.4 sec;   INR: 1.09          PTT - ( 04 Mar 2019 07:47 )  PTT:29.2 sec    Imaging:  None new to review     PEx:  T(C): 35.9 (19 @ 08:22), Max: 36.8 (19 @ 17:00)  HR: 81 (19 @ 08:22) (73 - 81)  BP: 122/75 (19 @ 08:22) (112/76 - 122/75)  RR: 15 (19 @ 08:22) (15 - 16)  SpO2: 99% (19 @ 08:22) (95% - 99%)  Wt(kg): -- 61.7  Daily     Daily   CAPILLARY BLOOD GLUCOSE    POCT Blood Glucose.: 127 mg/dL (05 Mar 2019 11:14)    I&O's Summary    04 Mar 2019 07:  -  05 Mar 2019 07:00  --------------------------------------------------------  IN: 4801.6 mL / OUT: 5775 mL / NET: -973.4 mL    05 Mar 2019 07:  -  05 Mar 2019 12:34  --------------------------------------------------------  IN: 732 mL / OUT: 0 mL / NET: 732 mL    General: Alert, oriented x3, appears in no distress, communicative   HEENT:  Atraumatic, normocephalic, decreasing white film- only now on posterior tongue   Neck: Neck supple   CVS: S1, S2 + regular rate and rhythm   Resp: Bilateral air entry, no wheezing, no rhonchi   GI: Abdomen soft, nontender, nondistended, bowel sounds +, Colostomy + stoma pink, + empty no stool   :  [place] in place , ++ yellowish colored urine output   Musc: Able to move all extremities , able to ambulate with assistance   Neuro: alert and oriented x3, follows commands, moving all extremities   Psych:  Calm, and cooperative, no agitation    Skin: Intact, Pallor +  No edema    Lymph: No LAD  Preadmit Karnofsky:  60%           Current Karnofsky:  60%  Cachexia (Y/N): No  BMI: 25.7    Advanced Directives:     Full Code    Decision maker: At this time, patient is currently capacity make all health care related decisions for herself.  Legal surrogate: Patient has officially designated her sister, Esha Hyatt as her primary Health Care Proxy, and Ashlyn Baker as her secondary HCP. Documentation filled and placed in chart. Contact #[phone], #[phone]  	      AGENCY CHOICE DISCUSSED:     [facility]            REFERRALS	        Palliative Med        Unit SW/Case Mgmt       Music Therapy        Nutrition       PT/OT

## 2019-03-05 NOTE — PROGRESS NOTE ADULT - SUBJECTIVE AND OBJECTIVE BOX
GYN Progress Note    Patient seen at bedside,     Denies CP, palpitations, SOB, fever, chills, nausea, vomiting.    Vital Signs Last 24 Hrs  T(C): 36.8 (05 Mar 2019 12:58), Max: 36.8 (04 Mar 2019 17:00)  T(F): 98.3 (05 Mar 2019 12:58), Max: 98.3 (04 Mar 2019 17:00)  HR: 78 (05 Mar 2019 12:58) (73 - 81)  BP: 104/72 (05 Mar 2019 12:58) (104/72 - 122/75)  BP(mean): --  RR: 16 (05 Mar 2019 12:58) (15 - 16)  SpO2: 98% (05 Mar 2019 12:58) (95% - 99%)    Physical Exam:  Gen: No Acute Distress  Pulm: Normal work of breathing  GI: soft, appropriately tender, nondistended, +BS, no rebound, no guarding.  Incision C/D/I  Ext: SCDs in place, wnl    I&O's Summary    04 Mar 2019 07:01  -  05 Mar 2019 07:00  --------------------------------------------------------  IN: 4801.6 mL / OUT: 5775 mL / NET: -973.4 mL    05 Mar 2019 07:01  -  05 Mar 2019 16:57  --------------------------------------------------------  IN: 1464 mL / OUT: 2100 mL / NET: -636 mL      MEDICATIONS  (STANDING):  chlorhexidine 2% Cloths 1 Application(s) Topical <User Schedule>  dextrose 5%. 1000 milliLiter(s) (50 mL/Hr) IV Continuous <Continuous>  dextrose 50% Injectable 12.5 Gram(s) IV Push once  dextrose 50% Injectable 25 Gram(s) IV Push once  dextrose 50% Injectable 25 Gram(s) IV Push once  enoxaparin Injectable 40 milliGRAM(s) SubCutaneous daily  escitalopram 10 milliGRAM(s) Oral at bedtime  insulin lispro (HumaLOG) corrective regimen sliding scale   SubCutaneous Before meals and at bedtime  lactated ringers. 1000 milliLiter(s) (100 mL/Hr) IV Continuous <Continuous>  nystatin    Suspension 231478 Unit(s) Oral four times a day  pantoprazole  Injectable 40 milliGRAM(s) IV Push every 24 hours  Parenteral Nutrition - Adult 1 Each (83 mL/Hr) TPN Continuous <Continuous>  Parenteral Nutrition - Adult 1 Each (83 mL/Hr) TPN Continuous <Continuous>  trimethoprim / sulfamethoxazole IVPB 160 milliGRAM(s) IV Intermittent every 12 hours    MEDICATIONS  (PRN):  acetaminophen   Tablet .. 650 milliGRAM(s) Oral every 6 hours PRN Temp greater or equal to 38C (100.4F), Mild Pain (1 - 3)  dextrose 40% Gel 15 Gram(s) Oral once PRN Blood Glucose LESS THAN 70 milliGRAM(s)/deciliter  glucagon  Injectable 1 milliGRAM(s) IntraMuscular once PRN Glucose LESS THAN 70 milligrams/deciliter  ibuprofen  Tablet. 600 milliGRAM(s) Oral every 6 hours PRN mild pain (1-3) not relieved with Acetaminophen  oxyCODONE    IR 5 milliGRAM(s) Oral every 6 hours PRN Moderate Pain (4 - 6)  oxyCODONE    IR 10 milliGRAM(s) Oral every 6 hours PRN Severe Pain (7 - 10)  sodium chloride 0.9% lock flush 10 milliLiter(s) IV Push every 1 hour PRN Pre/post blood products, medications, blood draw, and to maintain line patency      LABS:                        9.1    7.52  )-----------( 462      ( 05 Mar 2019 09:33 )             28.5     03-05    139  |  100  |  19  ----------------------------<  82  4.7   |  27  |  0.64    Ca    9.8      05 Mar 2019 09:32  Phos  4.0     03-05  Mg     1.9     03-05    TPro  6.5  /  Alb  3.3  /  TBili  <0.2  /  DBili  x   /  AST  20  /  ALT  13  /  AlkPhos  104  03-05    PT/INR - ( 04 Mar 2019 07:47 )   PT: 12.4 sec;   INR: 1.09          PTT - ( 04 Mar 2019 07:47 )  PTT:29.2 sec GYN Progress Note    Patient seen at bedside, denies any pain or nausea/vomiting. Patient has been out of bed to chair today. Feeling well.    Vital Signs Last 24 Hrs  T(C): 36.8 (05 Mar 2019 12:58), Max: 36.8 (04 Mar 2019 17:00)  T(F): 98.3 (05 Mar 2019 12:58), Max: 98.3 (04 Mar 2019 17:00)  HR: 78 (05 Mar 2019 12:58) (73 - 81)  BP: 104/72 (05 Mar 2019 12:58) (104/72 - 122/75)  BP(mean): --  RR: 16 (05 Mar 2019 12:58) (15 - 16)  SpO2: 98% (05 Mar 2019 12:58) (95% - 99%)    Physical Exam:  GA: NAD, A+OX3  Abd: +BS, soft, NTND, no rebound or guarding, LLQ ostomy w/ green stool and gas  no CVAT  : nystatin powder on perineum, no erythema, scant light brown spotting appreciated on pad  Extrem: no calf tenderness, SCDs in place  I&O's Summary    04 Mar 2019 07:01  -  05 Mar 2019 07:00  --------------------------------------------------------  IN: 4801.6 mL / OUT: 5775 mL / NET: -973.4 mL    05 Mar 2019 07:01  -  05 Mar 2019 16:57  --------------------------------------------------------  IN: 1464 mL / OUT: 2100 mL / NET: -636 mL      MEDICATIONS  (STANDING):  chlorhexidine 2% Cloths 1 Application(s) Topical <User Schedule>  dextrose 5%. 1000 milliLiter(s) (50 mL/Hr) IV Continuous <Continuous>  dextrose 50% Injectable 12.5 Gram(s) IV Push once  dextrose 50% Injectable 25 Gram(s) IV Push once  dextrose 50% Injectable 25 Gram(s) IV Push once  enoxaparin Injectable 40 milliGRAM(s) SubCutaneous daily  escitalopram 10 milliGRAM(s) Oral at bedtime  insulin lispro (HumaLOG) corrective regimen sliding scale   SubCutaneous Before meals and at bedtime  lactated ringers. 1000 milliLiter(s) (100 mL/Hr) IV Continuous <Continuous>  nystatin    Suspension 460863 Unit(s) Oral four times a day  pantoprazole  Injectable 40 milliGRAM(s) IV Push every 24 hours  Parenteral Nutrition - Adult 1 Each (83 mL/Hr) TPN Continuous <Continuous>  Parenteral Nutrition - Adult 1 Each (83 mL/Hr) TPN Continuous <Continuous>  trimethoprim / sulfamethoxazole IVPB 160 milliGRAM(s) IV Intermittent every 12 hours    MEDICATIONS  (PRN):  acetaminophen   Tablet .. 650 milliGRAM(s) Oral every 6 hours PRN Temp greater or equal to 38C (100.4F), Mild Pain (1 - 3)  dextrose 40% Gel 15 Gram(s) Oral once PRN Blood Glucose LESS THAN 70 milliGRAM(s)/deciliter  glucagon  Injectable 1 milliGRAM(s) IntraMuscular once PRN Glucose LESS THAN 70 milligrams/deciliter  ibuprofen  Tablet. 600 milliGRAM(s) Oral every 6 hours PRN mild pain (1-3) not relieved with Acetaminophen  oxyCODONE    IR 5 milliGRAM(s) Oral every 6 hours PRN Moderate Pain (4 - 6)  oxyCODONE    IR 10 milliGRAM(s) Oral every 6 hours PRN Severe Pain (7 - 10)  sodium chloride 0.9% lock flush 10 milliLiter(s) IV Push every 1 hour PRN Pre/post blood products, medications, blood draw, and to maintain line patency      LABS:                        9.1    7.52  )-----------( 462      ( 05 Mar 2019 09:33 )             28.5     03-05    139  |  100  |  19  ----------------------------<  82  4.7   |  27  |  0.64    Ca    9.8      05 Mar 2019 09:32  Phos  4.0     03-05  Mg     1.9     03-05    TPro  6.5  /  Alb  3.3  /  TBili  <0.2  /  DBili  x   /  AST  20  /  ALT  13  /  AlkPhos  104  03-05    PT/INR - ( 04 Mar 2019 07:47 )   PT: 12.4 sec;   INR: 1.09          PTT - ( 04 Mar 2019 07:47 )  PTT:29.2 sec

## 2019-03-05 NOTE — PROGRESS NOTE ADULT - PROBLEM SELECTOR PLAN 7
Endometrial Cancer dx in 7/2018; h/o Breast cancer in 2009 s/p chemo/radiation with left breast lumpectomy   - Continue plan per primary/Oncology teams.   - Family meeting Friday with Dr. Spivey discussing GOC. Patient/family plan for ongoing chemo/radiotherapy once she is clinically and functionally stable.
Endometrial Cancer dx in 7/2018; h/o Breast cancer in 2009 s/p chemo/radiation with left breast lumpectomy   - Continue plan per primary/Oncology teams.   - Family meeting Friday with Dr. Spivey discussing GOC. Patient/family plan for ongoing chemo/radiotherapy once she is clinically and functionally stable.
Pt and sister are adamant that pt not return to [facility] despite PT recs  pt acknowledges she is weaker and relies more on others for assistance.  SW to discuss options
Support provided to patient and family. Patient to have access to supportive services during rest of hospital stay as the patient/family deemed necessary ie. Chaplaincy, Massage therapy, Music therapy, Patient and family supportive services, Palliative SW, etc.    As discussed during the palliative IDT meeting and as identified during the patients PSSA screening the patient would benefit from: seen by music therapist today for support and encouragement.
Support provided to patient and family. Patient to have access to supportive services during rest of hospital stay as the patient/family deemed necessary ie. Chaplaincy, Massage therapy, Music therapy, Patient and family supportive services, Palliative SW, etc.    As discussed during the palliative IDT meeting and as identified during the patients PSSA screening the patient would benefit from: supportive care.
Endometrial cancer dx in 7/2018; h/o breast cancer in 2009 s/p chemo and radiation with left breast lumpectomy She has undergone one round of chemotherapy approximately 3 weeks ago; was to complete second cycle of chemotherapy Monday, 2/25.  - Continue plan per primary/Oncology teams- family meeting arranged today at 3:30pm with Dr. Spivey and patient to discuss GOC.

## 2019-03-05 NOTE — PROGRESS NOTE ADULT - ASSESSMENT
60yo G0 w/ known stage IV endometrial adenocarcinoma s/p staging surgery '18 and 1 round of chemotherapy 2/19 admitted for management of symptomatic rectovaginal and enterovaginal fistulas, now with complex fistula associated bacteruria, hemodynamically stable and afebrile    1. ID: urine culture from 2/27 positive for ESBL E. coli, associated due to complex fistula, previously on ceftriaxone but organism resistant, started bactrim DS BID on 3/1, repeat urine cx done today, on contact precautions  2. FEN/GI - NPO, IVH, replete electrolytes PRN, octreotide daily on TPN, Lipids next on 3/6, s/p PICC placement 2/27, GI following recs appreciated, electrolytes repleted via TPN  3. PAIN - well controlled, tylenol, oxycodone, motrin PRN  4.  - joseph in place, intermittent periods of leakage of scant amount of stool per vagina, general surgery consulted for recommendations on management of rectovaginal/enterovaginal fistulas, no surgical intervention at this time, and no indication for repeat imaging at this time, continue with nystatin powder and ointment for mons and labial irritation   5. Endocrine- ISS while patient remains NPO, holding home metformin at this time  6. RESP-  Saturating well on room air, encouraged ISS  8. VTE prophylaxis - SCDs, ambulate as tolerated with PT, Lovenox 40qd, PT    9. PT recommending [facility], pt initially did not want to go, now amenable  - PT consulted to walk patient, as patient needs to be ambulating daily and daily exercises   10. Palliative: palliative consulted, appreciate recommendations  - family discussion w/ Dr. Spivey and Hugo 2/28 regarding goals of care, to continue current management, radiation oncology consulted, f/u recs  - continue with Lexapro 10mg qhs for depressed mood

## 2019-03-05 NOTE — PROGRESS NOTE ADULT - ASSESSMENT
58yo G0 w/ known stage IV endometrial adenocarcinoma s/p staging surgery '18 and 1 round of chemotherapy 2/19 admitted for management of symptomatic rectovaginal and enterovaginal fistulas, now with complex fistula associated bacteruria, hemodynamically stable and afebrile    1. ID: urine culture from 2/27 positive for ESBL E. coli, associated due to complex fistula, previously on ceftriaxone but organism resistant, started bactrim DS BID on 3/1, repeat urine cx today, on precautions  2. FEN/GI - NPO, IVH, replete electrolytes PRN, octreotide daily on TPN, Lipids next on 3/4, s/p PICC placement 2/27, GI following recs appreciated, electrolytes repleted via TPN  3. PAIN - well controlled, tylenol, oxycodone, motrin PRN  4.  - joseph in place, intermittent periods of leakage of scant amount of stool per vagina, general surgery consulted for recommendations on management of rectovaginal/enterovaginal fistulas, no surgical intervention at this time, and no indication for repeat imaging at this time, continue with nystatin powder and ointment for mons and labial irritation   5. Endocrine- ISS while patient remains NPO, holding home metformin at this time  6. RESP-  Saturating well on room air, encouraged ISS  8. VTE prophylaxis - SCDs, ambulate as tolerated with PT, Lovenox 40qd, PT    9. PT recommending [facility], pt initially did not want to go, now amenable  - PT consulted to walk patient, as patient needs to be ambulating daily and daily exercises   10. Palliative: palliative consulted, appreciate recommendations  - family discussion w/ Dr. Goode 2/28 regarding goals of care, to continue current management, radiation oncology consulted  - continue with Lexapro 10mg qhs for depressed mood

## 2019-03-06 LAB
ANION GAP SERPL CALC-SCNC: 11 MMOL/L — SIGNIFICANT CHANGE UP (ref 5–17)
BUN SERPL-MCNC: 23 MG/DL — SIGNIFICANT CHANGE UP (ref 7–23)
CALCIUM SERPL-MCNC: 10 MG/DL — SIGNIFICANT CHANGE UP (ref 8.4–10.5)
CHLORIDE SERPL-SCNC: 99 MMOL/L — SIGNIFICANT CHANGE UP (ref 96–108)
CHOLEST SERPL-MCNC: 138 MG/DL — SIGNIFICANT CHANGE UP (ref 10–199)
CO2 SERPL-SCNC: 27 MMOL/L — SIGNIFICANT CHANGE UP (ref 22–31)
CREAT SERPL-MCNC: 0.67 MG/DL — SIGNIFICANT CHANGE UP (ref 0.5–1.3)
CULTURE RESULTS: SIGNIFICANT CHANGE UP
GLUCOSE BLDC GLUCOMTR-MCNC: 105 MG/DL — HIGH (ref 70–99)
GLUCOSE BLDC GLUCOMTR-MCNC: 137 MG/DL — HIGH (ref 70–99)
GLUCOSE BLDC GLUCOMTR-MCNC: 142 MG/DL — HIGH (ref 70–99)
GLUCOSE BLDC GLUCOMTR-MCNC: 229 MG/DL — HIGH (ref 70–99)
GLUCOSE SERPL-MCNC: 73 MG/DL — SIGNIFICANT CHANGE UP (ref 70–99)
HCT VFR BLD CALC: 30.5 % — LOW (ref 34.5–45)
HDLC SERPL-MCNC: 30 MG/DL — LOW
HGB BLD-MCNC: 9.3 G/DL — LOW (ref 11.5–15.5)
LIPID PNL WITH DIRECT LDL SERPL: 51 MG/DL — SIGNIFICANT CHANGE UP
MAGNESIUM SERPL-MCNC: 1.9 MG/DL — SIGNIFICANT CHANGE UP (ref 1.6–2.6)
MCHC RBC-ENTMCNC: 28.6 PG — SIGNIFICANT CHANGE UP (ref 27–34)
MCHC RBC-ENTMCNC: 30.5 GM/DL — LOW (ref 32–36)
MCV RBC AUTO: 93.8 FL — SIGNIFICANT CHANGE UP (ref 80–100)
NRBC # BLD: 0 /100 WBCS — SIGNIFICANT CHANGE UP (ref 0–0)
PHOSPHATE SERPL-MCNC: 3.8 MG/DL — SIGNIFICANT CHANGE UP (ref 2.5–4.5)
PLATELET # BLD AUTO: 485 K/UL — HIGH (ref 150–400)
POTASSIUM SERPL-MCNC: 5 MMOL/L — SIGNIFICANT CHANGE UP (ref 3.5–5.3)
POTASSIUM SERPL-SCNC: 5 MMOL/L — SIGNIFICANT CHANGE UP (ref 3.5–5.3)
RBC # BLD: 3.25 M/UL — LOW (ref 3.8–5.2)
RBC # FLD: 17.2 % — HIGH (ref 10.3–14.5)
SODIUM SERPL-SCNC: 137 MMOL/L — SIGNIFICANT CHANGE UP (ref 135–145)
SPECIMEN SOURCE: SIGNIFICANT CHANGE UP
TOTAL CHOLESTEROL/HDL RATIO MEASUREMENT: 4.6 RATIO — SIGNIFICANT CHANGE UP (ref 3.3–7.1)
TRIGL SERPL-MCNC: 287 MG/DL — HIGH (ref 10–149)
WBC # BLD: 7.25 K/UL — SIGNIFICANT CHANGE UP (ref 3.8–10.5)
WBC # FLD AUTO: 7.25 K/UL — SIGNIFICANT CHANGE UP (ref 3.8–10.5)

## 2019-03-06 PROCEDURE — 99222 1ST HOSP IP/OBS MODERATE 55: CPT

## 2019-03-06 PROCEDURE — 99231 SBSQ HOSP IP/OBS SF/LOW 25: CPT

## 2019-03-06 RX ORDER — NYSTATIN CREAM 100000 [USP'U]/G
1 CREAM TOPICAL
Qty: 0 | Refills: 0 | Status: DISCONTINUED | OUTPATIENT
Start: 2019-03-06 | End: 2019-03-23

## 2019-03-06 RX ORDER — I.V. FAT EMULSION 20 G/100ML
0.81 EMULSION INTRAVENOUS
Qty: 50 | Refills: 0 | Status: DISCONTINUED | OUTPATIENT
Start: 2019-03-06 | End: 2019-03-06

## 2019-03-06 RX ORDER — ELECTROLYTE SOLUTION,INJ
1 VIAL (ML) INTRAVENOUS
Qty: 0 | Refills: 0 | Status: DISCONTINUED | OUTPATIENT
Start: 2019-03-06 | End: 2019-03-06

## 2019-03-06 RX ADMIN — Medication 260 MILLIGRAM(S): at 18:00

## 2019-03-06 RX ADMIN — ENOXAPARIN SODIUM 40 MILLIGRAM(S): 100 INJECTION SUBCUTANEOUS at 13:12

## 2019-03-06 RX ADMIN — Medication 1 EACH: at 17:23

## 2019-03-06 RX ADMIN — Medication 650 MILLIGRAM(S): at 22:05

## 2019-03-06 RX ADMIN — CHLORHEXIDINE GLUCONATE 1 APPLICATION(S): 213 SOLUTION TOPICAL at 06:02

## 2019-03-06 RX ADMIN — Medication 260 MILLIGRAM(S): at 06:05

## 2019-03-06 RX ADMIN — NYSTATIN CREAM 1 APPLICATION(S): 100000 CREAM TOPICAL at 17:30

## 2019-03-06 RX ADMIN — Medication 4: at 07:57

## 2019-03-06 RX ADMIN — Medication 500000 UNIT(S): at 05:59

## 2019-03-06 RX ADMIN — I.V. FAT EMULSION 20.83 GM/KG/DAY: 20 EMULSION INTRAVENOUS at 17:23

## 2019-03-06 RX ADMIN — PANTOPRAZOLE SODIUM 40 MILLIGRAM(S): 20 TABLET, DELAYED RELEASE ORAL at 07:27

## 2019-03-06 RX ADMIN — Medication 500000 UNIT(S): at 00:05

## 2019-03-06 RX ADMIN — Medication 650 MILLIGRAM(S): at 22:35

## 2019-03-06 NOTE — PROGRESS NOTE ADULT - ASSESSMENT
60yo G0 w/ known stage IV endometrial adenocarcinoma s/p staging surgery '18 and 1 round of chemotherapy 2/19 admitted for management of symptomatic rectovaginal and enterovaginal fistulas, now with complex fistula associated bacteruria, hemodynamically stable and afebrile    1. ID: urine culture from 2/27 positive for ESBL E. coli, associated due to complex fistula, previously on ceftriaxone but organism resistant, started bactrim DS BID on 3/1, repeat urine cx 3/5 pending, on precautions  2. FEN/GI - NPO, IVH, replete electrolytes PRN, octreotide daily on TPN, Lipids next on 3/4, s/p PICC placement 2/27, GI following recs appreciated, electrolytes repleted via TPN  3. PAIN - well controlled, tylenol, oxycodone, motrin PRN  4.  - joseph in place, intermittent periods of leakage of scant amount of stool per vagina, general surgery consulted for recommendations on management of rectovaginal/enterovaginal fistulas, no surgical intervention at this time, and no indication for repeat imaging at this time, continue with nystatin powder and ointment for mons and labial irritation   5. Endocrine- ISS while patient remains NPO, holding home metformin at this time  6. RESP-  Saturating well on room air, encouraged ISS  8. VTE prophylaxis - SCDs, ambulate as tolerated with PT, Lovenox 40qd, PT    9. PT recommending LITO, pt initially did not want to go, now amenable  - PT consulted to walk patient, as patient needs to be ambulating daily and daily exercises   10. Palliative: palliative consulted, appreciate recommendations  - family discussion w/ Dr. Goode 2/28 regarding goals of care, to continue current management, radiation oncology consulted  - continue with Lexapro 10mg qhs for depressed mood

## 2019-03-06 NOTE — CONSULT NOTE ADULT - SUBJECTIVE AND OBJECTIVE BOX
Radiation Oncology Consult Note    HPI:  60yo G0 with PMHx significant for breast cancer, diabetes mellitus, endometrial cancer stage 3, s/p exploratory laparotomy, enterolysis, TOM, BSO, pelvic lymphadenectomy, low anterior resection mobilization of splenic flexure, end colostomy on 18 now with rectovaginal fistula.  She was admitted to [facility] one week ago for treatment of UTI, and now transferred to [facility] for further care.     Pt denies fever, chills, chest pain, SOB, abdominal pain, nausea, vomiting, vaginal bleeding.  She reports having pain and discomfort around her perineum.  She reports being completely incontinent of urine.  Approximately 1 week ago she had leakage of stool from vagina, and was subsequently admitted to [facility] where she reports receiving IV antibiotics.  She has not been ambulatory for the past month since discharge from her rehab facility.  She has undergone one round of chemotherapy approximately 3 weeks ago; to complete second cycle of chemotherapy this monday, .  Enterovaginal and rectovaginal fistulization found on MRI on 19 at [facility].    OB/GYN Hx: G0, endometrial cancer dx in 2018; h/o breast cancer in  s/p chemo and radiation with left breast lumpectomy  PMHx: T2DM, polio in childhood, h/o breast cancer  SHx: left breast lumpectomy, right knee surgery with screw placement , 2018 exploratory laparotomy, enterolysis, TOM, BSO, pelvic lymphadenectomy, low anterior resection mobilization of splenic flexure, end colostomy   Meds: metformin 98056 BID, nexium  Allergies: NKDA    PHYSICAL EXAM:   Vital Signs Last 24 Hrs  T(C): 36.2 (2019 20:10), Max: 36.2 (2019 20:10)  T(F): 97.2 (2019 20:10), Max: 97.2 (2019 20:10)  HR: 91 (2019 20:10) (91 - 91)  BP: 141/93 (2019 20:10) (141/93 - 141/93)  BP(mean): --  RR: 17 (2019 20:10) (17 - 17)  SpO2: 96% (2019 20:10) (96% - 96%)    **************************        LABS:                        8.3    4.90  )-----------( 424      ( 2019 22:07 )             26.2         140  |  102  |  9   ----------------------------<  197<H>  3.4<L>   |  27  |  0.78    Ca    9.1      2019 22:07    TPro  6.7  /  Alb  3.1<L>  /  TBili  0.2  /  DBili  x   /  AST  10  /  ALT  9<L>  /  AlkPhos  69      PT/INR - ( 2019 22:07 )   PT: 12.0 sec;   INR: 1.06          PTT - ( 2019 22:07 )  PTT:30.9 sec (2019 02:13)      Allergies    Allergy Status Unknown    Intolerances        MEDICATIONS  (STANDING):  chlorhexidine 2% Cloths 1 Application(s) Topical <User Schedule>  dextrose 5%. 1000 milliLiter(s) (50 mL/Hr) IV Continuous <Continuous>  dextrose 50% Injectable 12.5 Gram(s) IV Push once  dextrose 50% Injectable 25 Gram(s) IV Push once  dextrose 50% Injectable 25 Gram(s) IV Push once  enoxaparin Injectable 40 milliGRAM(s) SubCutaneous daily  escitalopram 10 milliGRAM(s) Oral at bedtime  fat emulsion (Plant Based) 20% Infusion 0.81 Gm/kG/Day (20.824 mL/Hr) IV Continuous <Continuous>  insulin lispro (HumaLOG) corrective regimen sliding scale   SubCutaneous Before meals and at bedtime  lactated ringers. 1000 milliLiter(s) (100 mL/Hr) IV Continuous <Continuous>  nystatin Powder 1 Application(s) Topical two times a day  pantoprazole  Injectable 40 milliGRAM(s) IV Push every 24 hours  Parenteral Nutrition - Adult 1 Each (83 mL/Hr) TPN Continuous <Continuous>  Parenteral Nutrition - Adult 1 Each (83 mL/Hr) TPN Continuous <Continuous>  trimethoprim / sulfamethoxazole IVPB 160 milliGRAM(s) IV Intermittent every 12 hours    MEDICATIONS  (PRN):  acetaminophen   Tablet .. 650 milliGRAM(s) Oral every 6 hours PRN Temp greater or equal to 38C (100.4F), Mild Pain (1 - 3)  dextrose 40% Gel 15 Gram(s) Oral once PRN Blood Glucose LESS THAN 70 milliGRAM(s)/deciliter  glucagon  Injectable 1 milliGRAM(s) IntraMuscular once PRN Glucose LESS THAN 70 milligrams/deciliter  ibuprofen  Tablet. 600 milliGRAM(s) Oral every 6 hours PRN mild pain (1-3) not relieved with Acetaminophen  oxyCODONE    IR 5 milliGRAM(s) Oral every 6 hours PRN Moderate Pain (4 - 6)  oxyCODONE    IR 10 milliGRAM(s) Oral every 6 hours PRN Severe Pain (7 - 10)  sodium chloride 0.9% lock flush 10 milliLiter(s) IV Push every 1 hour PRN Pre/post blood products, medications, blood draw, and to maintain line patency      PAST MEDICAL & SURGICAL HISTORY:  T2DM, polio in childhood, h/o breast cancer  Left breast lumpectomy, right knee surgery with screw placement , 2018 exploratory laparotomy, enterolysis, TOM, BSO, pelvic lymphadenectomy, low anterior resection mobilization of splenic flexure, end colostomy       FAMILY HISTORY:  No known family history of gynecological cancer      SOCIAL HISTORY: No EtOH, no tobacco.           T(F): 97 (19 @ 05:36), Max: 98.3 (19 @ 12:58)  HR: 81 (19 @ 10:10)  BP: 111/78 (19 @ 10:10)  RR: 16 (19 @ 10:10)  SpO2: 97% (19 @ 10:10)  Wt(kg): --    GENERAL: NAD, well-developed  HEAD:  Atraumatic, Normocephalic  EYES: EOMI, PERRLA, conjunctiva and sclera clear  NECK: Supple, No JVD  CHEST/LUNG: Clear to auscultation bilaterally; No wheeze  HEART: Regular rate and rhythm; No murmurs, rubs, or gallops  ABDOMEN: soft, non tender, positive bowel sounds, no rebound or guarding, left ostomy perfused well with minimal stool output in bag; midline scar visualized.  EXTREMITIES:  2+ Peripheral Pulses, No clubbing, cyanosis, or edema  NEUROLOGY: A,A, Ox3. non-focal  SKIN: No rashes or lesions                          9.3    7.25  )-----------( 485      ( 06 Mar 2019 11:02 )             30.5       03-06    137  |  99  |  23  ----------------------------<  73  5.0   |  27  |  0.67    Ca    10.0      06 Mar 2019 11:02  Phos  3.8     03-06  Mg     1.9     -06    TPro  6.5  /  Alb  3.3  /  TBili  <0.2  /  DBili  x   /  AST  20  /  ALT  13  /  AlkPhos  104  03-05      Magnesium, Serum: 1.9 mg/dL ( @ 11:02)  Phosphorus Level, Serum: 3.8 mg/dL ( @ 11:02)    IMAGING    EXAM:  MR PELVIS IC OC                          PROCEDURE DATE:  2019        INTERPRETATION:  ATTENDING RADIOLOGIST ADDENDUM: AGREE WITH THE BELOW   REPORT.    [facility]  Preliminary Radiology Report  Call: [phone]  assistance Online chat: [email or web address]    Patient Name: JOHN MCDANIEL MRN: [id number]   (Age): 1960 59 Gender: F    Date of Exam: 2019 Accession: [id number]  Referring Physician: SHAKEEL PAIGE # of Images: [phone]  Ordered As: MR PELVIS W    CONFIDENTIALITY STATEMENT  This report is intended only for the use of the referring physician, and   only in accordance with law, If you received this in error, call   [phone]  Page 1 of 1    EXAM:  MR Pelvis With Contrast    EXAM DATE/TIME:  2019 2:15 PM  CLINICAL HISTORY:  59 years old, female; Condition or disease; Other: HX endometrial   carcinoma. Rectovaginal and  enterovaginal fistula; Prior surgery; Surgery date: 6+ months; Surgery   type: Tom-bso and colostomy    TECHNIQUE:  Magnetic resonance images of the pelvis with intravenous contrast.    COMPARISON:  QN CT ABDOMEN PELVIS WITH CONTRAST 2019 9:14:55 PM    FINDINGS:  Tubes, catheters and devices: Westbrook catheter in the bladder.   Mild-to-moderate diffuse bladder wall  thickening and enhancement with surrounding edematous change suggestive   of cystitis.   Intraperitoneal space: No free fluid.  Reproductive: Previous hysterectomy and bilateral oophorectomy. Air in   the vagina. Enterovaginal fistula seen on previous CT and not as   well-demonstrated on this exam.  Previous distal colectomy, and colostomy at the left lower quadrant.      IMPRESSION:  1. Previous hysterectomy and bilateral oophorectomy. Enterovaginal   fistula seen on previous CT and  not as well-demonstrated on this exam, likely due to exam limitation. 2   fistula between small bowel and vagina was clearly demonstrated on recent   CT with enteric contrast, best on CT coronal image 37.  2. Westbrook catheter in the bladder. Mild-to-moderate diffuse bladder wall   thickening and enhancement  with surrounding edematous change suggestive of a nonspecific cystitis.   Correlate clinically.      Thank you for allowing us to participate in the care of your patient.  Dictated and Authenticated by: Michael Barraza MD  2019 11:40 PM Eastern Time ([place] & [place])    The above report was submitted by the [facility] attending radiologist and   copied to PowerScribe by resident Dr. Joaquin..        "Thank you for the opportunity to participate in the care of this   patient."    MERLIN JOAQUIN M.D., RADIOLOGY RESIDENT  This document has been electronically signed.  STAN WILSON M.D., ATTENDING RADIOLOGIST  This document has been electronically signed. 2019 11:24AM

## 2019-03-06 NOTE — PROGRESS NOTE ADULT - SUBJECTIVE AND OBJECTIVE BOX
GYN Progress Note    Patient seen at bedside, feeling well. Vulvar pruritis improved with nystatin powder. Denies CP, palpitations, SOB, fever, chills, nausea, vomiting, or abdominal pain. Patient has not yet been seen by PT today and has not yet been OOB.     Vital Signs Last 24 Hrs  T(C): 36.1 (06 Mar 2019 05:36), Max: 36.1 (06 Mar 2019 05:36)  T(F): 97 (06 Mar 2019 05:36), Max: 97 (06 Mar 2019 05:36)  HR: 81 (06 Mar 2019 10:10) (77 - 85)  BP: 111/78 (06 Mar 2019 10:10) (111/78 - 136/98)  BP(mean): --  RR: 16 (06 Mar 2019 10:10) (16 - 17)  SpO2: 97% (06 Mar 2019 10:10) (97% - 98%)    Physical Exam  GA: NAD, A+OX3  Abd: soft, NTND, no rebound or guarding, LLQ ostomy w/ no stool or gas (recently had bag changed)  no CVAT  : nystatin powder on perineum, no erythema, clean chux w/o visible stool on pad  Extrem: no calf tenderness, SCDs in place    I&O's Summary    05 Mar 2019 07:01  -  06 Mar 2019 07:00  --------------------------------------------------------  IN: 3088 mL / OUT: 4600 mL / NET: -1512 mL    06 Mar 2019 07:01  -  06 Mar 2019 13:09  --------------------------------------------------------  IN: 0 mL / OUT: 1150 mL / NET: -1150 mL      MEDICATIONS  (STANDING):  chlorhexidine 2% Cloths 1 Application(s) Topical <User Schedule>  dextrose 5%. 1000 milliLiter(s) (50 mL/Hr) IV Continuous <Continuous>  dextrose 50% Injectable 12.5 Gram(s) IV Push once  dextrose 50% Injectable 25 Gram(s) IV Push once  dextrose 50% Injectable 25 Gram(s) IV Push once  enoxaparin Injectable 40 milliGRAM(s) SubCutaneous daily  escitalopram 10 milliGRAM(s) Oral at bedtime  fat emulsion (Plant Based) 20% Infusion 0.81 Gm/kG/Day (20.83 mL/Hr) IV Continuous <Continuous>  insulin lispro (HumaLOG) corrective regimen sliding scale   SubCutaneous every 6 hours  lactated ringers. 1000 milliLiter(s) (100 mL/Hr) IV Continuous <Continuous>  nystatin Powder 1 Application(s) Topical two times a day  pantoprazole  Injectable 40 milliGRAM(s) IV Push every 24 hours  Parenteral Nutrition - Adult 1 Each (83 mL/Hr) TPN Continuous <Continuous>  Parenteral Nutrition - Adult 1 Each (83 mL/Hr) TPN Continuous <Continuous>  trimethoprim / sulfamethoxazole IVPB 160 milliGRAM(s) IV Intermittent every 12 hours    MEDICATIONS  (PRN):  acetaminophen   Tablet .. 650 milliGRAM(s) Oral every 6 hours PRN Temp greater or equal to 38C (100.4F), Mild Pain (1 - 3)  dextrose 40% Gel 15 Gram(s) Oral once PRN Blood Glucose LESS THAN 70 milliGRAM(s)/deciliter  glucagon  Injectable 1 milliGRAM(s) IntraMuscular once PRN Glucose LESS THAN 70 milligrams/deciliter  ibuprofen  Tablet. 600 milliGRAM(s) Oral every 6 hours PRN mild pain (1-3) not relieved with Acetaminophen  oxyCODONE    IR 5 milliGRAM(s) Oral every 6 hours PRN Moderate Pain (4 - 6)  oxyCODONE    IR 10 milliGRAM(s) Oral every 6 hours PRN Severe Pain (7 - 10)  sodium chloride 0.9% lock flush 10 milliLiter(s) IV Push every 1 hour PRN Pre/post blood products, medications, blood draw, and to maintain line patency      LABS:                        9.3    7.25  )-----------( 485      ( 06 Mar 2019 11:02 )             30.5     03-06    137  |  99  |  23  ----------------------------<  73  5.0   |  27  |  0.67    Ca    10.0      06 Mar 2019 11:02  Phos  3.8     03-06  Mg     1.9     03-06    TPro  6.5  /  Alb  3.3  /  TBili  <0.2  /  DBili  x   /  AST  20  /  ALT  13  /  AlkPhos  104  03-05

## 2019-03-06 NOTE — PROGRESS NOTE ADULT - ASSESSMENT
60yo G0 w/ known stage IV endometrial adenocarcinoma s/p staging surgery '18 and 1 round of chemotherapy 2/19 admitted for management of symptomatic rectovaginal and enterovaginal fistulas, now with complex fistula associated bacteruria, hemodynamically stable and afebrile  1. ID: urine culture from 2/27 positive for ESBL E. coli, associated due to complex fistula, previously on ceftriaxone but organism resistant, started bactrim DS BID on 3/1, repeat urine cx 3/5 contaminated, will repeat UCx today, on contact precautions  2. FEN/GI - NPO, IVH, replete electrolytes PRN, octreotide daily on TPN, Lipids today and next on 3/8, s/p PICC placement 2/27, GI following recs appreciated, electrolytes repleted via TPN  3. PAIN - well controlled, tylenol, oxycodone, motrin PRN  4.  - joseph in place, intermittent periods of leakage of scant amount of stool per vagina, general surgery consulted for recommendations on management of rectovaginal/enterovaginal fistulas, no surgical intervention at this time, and no indication for repeat imaging at this time, continue with nystatin powder and ointment for mons and labial irritation   5. Endocrine- ISS while patient remains NPO, holding home metformin at this time  6. RESP-  Saturating well on room air, encouraged ISS  7. VTE prophylaxis - SCDs, ambulate as tolerated with PT, Lovenox 40qd, PT    8. PT recommending L[facility], pt initially did not want to go, now amenable  - PT consulted to walk patient, as patient needs to be ambulating daily and daily exercises   9. Palliative: palliative consulted, appreciate recommendations  - family discussion w/ Dr. Spivey and Hugo 2/28 regarding goals of care, to continue current management, radiation oncology consulted and saw patient today (3/6), f/u recs  - continue with Lexapro 10mg qhs for depressed mood

## 2019-03-06 NOTE — PROGRESS NOTE ADULT - SUBJECTIVE AND OBJECTIVE BOX
Pt evaluated at bedside, reports feeling well. She states she has mild itching on her vulva.  She denies fever/chills, HA, dizziness, SOB, CP, palpitations, n/v, abdominal pain. She ambulated out of bed w/ PT yesterday.    T(C): 36.1 (03-06-19 @ 05:36), Max: 36.1 (03-06-19 @ 05:36)  HR: 77 (03-06-19 @ 05:36) (77 - 77)  BP: 123/83 (03-06-19 @ 05:36) (123/83 - 126/88)  RR: 17 (03-06-19 @ 05:36) (17 - 17)  SpO2: 98% (03-06-19 @ 05:36) (97% - 98%)  GA: NAD, A+OX3  Abd: +BS, soft, NTND, no rebound or guarding, LLQ ostomy w/ green stool and gas  no CVAT  : nystatin powder on perineum, no erythema, clean chux w/o visible stool on pad  Extrem: no calf tenderness, SCDs in place    03-05 @ 07:01  -  03-06 @ 06:27  --------------------------------------------------------  IN: 3088 mL / OUT: 4600 mL / NET: -1512 mL                        9.1    7.52  )-----------( 462      ( 05 Mar 2019 09:33 )             28.5     03-05    139  |  100  |  19  ----------------------------<  82  4.7   |  27  |  0.64    Ca    9.8      05 Mar 2019 09:32  Phos  4.0     03-05  Mg     1.9     03-05    TPro  6.5  /  Alb  3.3  /  TBili  <0.2  /  DBili  x   /  AST  20  /  ALT  13  /  AlkPhos  104  03-05    MEDICATIONS  (STANDING):  chlorhexidine 2% Cloths 1 Application(s) Topical <User Schedule>  dextrose 5%. 1000 milliLiter(s) (50 mL/Hr) IV Continuous <Continuous>  dextrose 50% Injectable 12.5 Gram(s) IV Push once  dextrose 50% Injectable 25 Gram(s) IV Push once  dextrose 50% Injectable 25 Gram(s) IV Push once  enoxaparin Injectable 40 milliGRAM(s) SubCutaneous daily  escitalopram 10 milliGRAM(s) Oral at bedtime  insulin lispro (HumaLOG) corrective regimen sliding scale   SubCutaneous Before meals and at bedtime  lactated ringers. 1000 milliLiter(s) (100 mL/Hr) IV Continuous <Continuous>  nystatin    Suspension [id number] Unit(s) Oral four times a day  pantoprazole  Injectable 40 milliGRAM(s) IV Push every 24 hours  Parenteral Nutrition - Adult 1 Each (83 mL/Hr) TPN Continuous <Continuous>  trimethoprim / sulfamethoxazole IVPB 160 milliGRAM(s) IV Intermittent every 12 hours    MEDICATIONS  (PRN):  acetaminophen   Tablet .. 650 milliGRAM(s) Oral every 6 hours PRN Temp greater or equal to 38C (100.4F), Mild Pain (1 - 3)  dextrose 40% Gel 15 Gram(s) Oral once PRN Blood Glucose LESS THAN 70 milliGRAM(s)/deciliter  glucagon  Injectable 1 milliGRAM(s) IntraMuscular once PRN Glucose LESS THAN 70 milligrams/deciliter  ibuprofen  Tablet. 600 milliGRAM(s) Oral every 6 hours PRN mild pain (1-3) not relieved with Acetaminophen  oxyCODONE    IR 5 milliGRAM(s) Oral every 6 hours PRN Moderate Pain (4 - 6)  oxyCODONE    IR 10 milliGRAM(s) Oral every 6 hours PRN Severe Pain (7 - 10)  sodium chloride 0.9% lock flush 10 milliLiter(s) IV Push every 1 hour PRN Pre/post blood products, medications, blood draw, and to maintain line patency

## 2019-03-07 LAB
ALBUMIN SERPL ELPH-MCNC: 3.3 G/DL — SIGNIFICANT CHANGE UP (ref 3.3–5)
ALP SERPL-CCNC: 101 U/L — SIGNIFICANT CHANGE UP (ref 40–120)
ALT FLD-CCNC: 15 U/L — SIGNIFICANT CHANGE UP (ref 10–45)
ANION GAP SERPL CALC-SCNC: 11 MMOL/L — SIGNIFICANT CHANGE UP (ref 5–17)
AST SERPL-CCNC: 18 U/L — SIGNIFICANT CHANGE UP (ref 10–40)
BASOPHILS # BLD AUTO: 0.03 K/UL — SIGNIFICANT CHANGE UP (ref 0–0.2)
BASOPHILS NFR BLD AUTO: 0.4 % — SIGNIFICANT CHANGE UP (ref 0–2)
BILIRUB SERPL-MCNC: <0.2 MG/DL — SIGNIFICANT CHANGE UP (ref 0.2–1.2)
BUN SERPL-MCNC: 26 MG/DL — HIGH (ref 7–23)
CALCIUM SERPL-MCNC: 9.9 MG/DL — SIGNIFICANT CHANGE UP (ref 8.4–10.5)
CHLORIDE SERPL-SCNC: 98 MMOL/L — SIGNIFICANT CHANGE UP (ref 96–108)
CHOLEST SERPL-MCNC: 127 MG/DL — SIGNIFICANT CHANGE UP (ref 10–199)
CO2 SERPL-SCNC: 26 MMOL/L — SIGNIFICANT CHANGE UP (ref 22–31)
CREAT SERPL-MCNC: 0.7 MG/DL — SIGNIFICANT CHANGE UP (ref 0.5–1.3)
EOSINOPHIL # BLD AUTO: 0.25 K/UL — SIGNIFICANT CHANGE UP (ref 0–0.5)
EOSINOPHIL NFR BLD AUTO: 3.4 % — SIGNIFICANT CHANGE UP (ref 0–6)
GLUCOSE BLDC GLUCOMTR-MCNC: 121 MG/DL — HIGH (ref 70–99)
GLUCOSE BLDC GLUCOMTR-MCNC: 151 MG/DL — HIGH (ref 70–99)
GLUCOSE BLDC GLUCOMTR-MCNC: 160 MG/DL — HIGH (ref 70–99)
GLUCOSE BLDC GLUCOMTR-MCNC: 168 MG/DL — HIGH (ref 70–99)
GLUCOSE SERPL-MCNC: 93 MG/DL — SIGNIFICANT CHANGE UP (ref 70–99)
HCT VFR BLD CALC: 29 % — LOW (ref 34.5–45)
HDLC SERPL-MCNC: 29 MG/DL — LOW
HGB BLD-MCNC: 8.8 G/DL — LOW (ref 11.5–15.5)
IMM GRANULOCYTES NFR BLD AUTO: 0.5 % — SIGNIFICANT CHANGE UP (ref 0–1.5)
LIPID PNL WITH DIRECT LDL SERPL: 46 MG/DL — SIGNIFICANT CHANGE UP
LYMPHOCYTES # BLD AUTO: 1.79 K/UL — SIGNIFICANT CHANGE UP (ref 1–3.3)
LYMPHOCYTES # BLD AUTO: 24.5 % — SIGNIFICANT CHANGE UP (ref 13–44)
MAGNESIUM SERPL-MCNC: 1.9 MG/DL — SIGNIFICANT CHANGE UP (ref 1.6–2.6)
MCHC RBC-ENTMCNC: 28.8 PG — SIGNIFICANT CHANGE UP (ref 27–34)
MCHC RBC-ENTMCNC: 30.3 GM/DL — LOW (ref 32–36)
MCV RBC AUTO: 94.8 FL — SIGNIFICANT CHANGE UP (ref 80–100)
MONOCYTES # BLD AUTO: 0.85 K/UL — SIGNIFICANT CHANGE UP (ref 0–0.9)
MONOCYTES NFR BLD AUTO: 11.6 % — SIGNIFICANT CHANGE UP (ref 2–14)
NEUTROPHILS # BLD AUTO: 4.35 K/UL — SIGNIFICANT CHANGE UP (ref 1.8–7.4)
NEUTROPHILS NFR BLD AUTO: 59.6 % — SIGNIFICANT CHANGE UP (ref 43–77)
NRBC # BLD: 0 /100 WBCS — SIGNIFICANT CHANGE UP (ref 0–0)
PHOSPHATE SERPL-MCNC: 4.4 MG/DL — SIGNIFICANT CHANGE UP (ref 2.5–4.5)
PLATELET # BLD AUTO: 407 K/UL — HIGH (ref 150–400)
POTASSIUM SERPL-MCNC: 4.8 MMOL/L — SIGNIFICANT CHANGE UP (ref 3.5–5.3)
POTASSIUM SERPL-SCNC: 4.8 MMOL/L — SIGNIFICANT CHANGE UP (ref 3.5–5.3)
PROT SERPL-MCNC: 6.9 G/DL — SIGNIFICANT CHANGE UP (ref 6–8.3)
RBC # BLD: 3.06 M/UL — LOW (ref 3.8–5.2)
RBC # FLD: 17.3 % — HIGH (ref 10.3–14.5)
SODIUM SERPL-SCNC: 135 MMOL/L — SIGNIFICANT CHANGE UP (ref 135–145)
TOTAL CHOLESTEROL/HDL RATIO MEASUREMENT: 4.4 RATIO — SIGNIFICANT CHANGE UP (ref 3.3–7.1)
TRIGL SERPL-MCNC: 259 MG/DL — HIGH (ref 10–149)
WBC # BLD: 7.31 K/UL — SIGNIFICANT CHANGE UP (ref 3.8–10.5)
WBC # FLD AUTO: 7.31 K/UL — SIGNIFICANT CHANGE UP (ref 3.8–10.5)

## 2019-03-07 PROCEDURE — 99231 SBSQ HOSP IP/OBS SF/LOW 25: CPT

## 2019-03-07 RX ORDER — HYDROMORPHONE HYDROCHLORIDE 2 MG/ML
0.25 INJECTION INTRAMUSCULAR; INTRAVENOUS; SUBCUTANEOUS ONCE
Qty: 0 | Refills: 0 | Status: DISCONTINUED | OUTPATIENT
Start: 2019-03-07 | End: 2019-03-07

## 2019-03-07 RX ORDER — ZINC OXIDE 200 MG/G
1 OINTMENT TOPICAL THREE TIMES A DAY
Qty: 0 | Refills: 0 | Status: DISCONTINUED | OUTPATIENT
Start: 2019-03-07 | End: 2019-03-25

## 2019-03-07 RX ORDER — ENOXAPARIN SODIUM 100 MG/ML
40 INJECTION SUBCUTANEOUS DAILY
Qty: 0 | Refills: 0 | Status: DISCONTINUED | OUTPATIENT
Start: 2019-03-07 | End: 2019-03-25

## 2019-03-07 RX ORDER — ELECTROLYTE SOLUTION,INJ
1 VIAL (ML) INTRAVENOUS
Qty: 0 | Refills: 0 | Status: DISCONTINUED | OUTPATIENT
Start: 2019-03-07 | End: 2019-03-07

## 2019-03-07 RX ADMIN — Medication 260 MILLIGRAM(S): at 19:02

## 2019-03-07 RX ADMIN — Medication 600 MILLIGRAM(S): at 02:53

## 2019-03-07 RX ADMIN — Medication 650 MILLIGRAM(S): at 08:25

## 2019-03-07 RX ADMIN — HYDROMORPHONE HYDROCHLORIDE 0.25 MILLIGRAM(S): 2 INJECTION INTRAMUSCULAR; INTRAVENOUS; SUBCUTANEOUS at 10:20

## 2019-03-07 RX ADMIN — Medication 2: at 06:09

## 2019-03-07 RX ADMIN — CHLORHEXIDINE GLUCONATE 1 APPLICATION(S): 213 SOLUTION TOPICAL at 06:03

## 2019-03-07 RX ADMIN — Medication 260 MILLIGRAM(S): at 06:02

## 2019-03-07 RX ADMIN — ZINC OXIDE 1 APPLICATION(S): 200 OINTMENT TOPICAL at 21:02

## 2019-03-07 RX ADMIN — Medication 1 EACH: at 19:01

## 2019-03-07 RX ADMIN — NYSTATIN CREAM 1 APPLICATION(S): 100000 CREAM TOPICAL at 19:02

## 2019-03-07 RX ADMIN — HYDROMORPHONE HYDROCHLORIDE 0.25 MILLIGRAM(S): 2 INJECTION INTRAMUSCULAR; INTRAVENOUS; SUBCUTANEOUS at 10:45

## 2019-03-07 RX ADMIN — NYSTATIN CREAM 1 APPLICATION(S): 100000 CREAM TOPICAL at 06:04

## 2019-03-07 RX ADMIN — Medication 650 MILLIGRAM(S): at 08:40

## 2019-03-07 RX ADMIN — Medication 2: at 13:04

## 2019-03-07 RX ADMIN — PANTOPRAZOLE SODIUM 40 MILLIGRAM(S): 20 TABLET, DELAYED RELEASE ORAL at 06:03

## 2019-03-07 RX ADMIN — Medication 600 MILLIGRAM(S): at 03:30

## 2019-03-07 RX ADMIN — Medication 2: at 00:32

## 2019-03-07 RX ADMIN — ENOXAPARIN SODIUM 40 MILLIGRAM(S): 100 INJECTION SUBCUTANEOUS at 13:05

## 2019-03-07 NOTE — PROGRESS NOTE ADULT - ASSESSMENT
60yo G0 w/ known stage IV endometrial adenocarcinoma s/p staging surgery '18 and 1 round of chemotherapy 2/19 admitted for management of symptomatic rectovaginal and enterovaginal fistulas, now with complex fistula associated bacteruria, hemodynamically stable and afebrile    1. ID: urine culture from 2/27 positive for ESBL E. coli, associated due to complex fistula, previously on ceftriaxone but organism resistant, started bactrim DS BID on 3/1, on precautions; repeat urine culture 3/6 preliminary growing gram 50K CFUs of GNR  2. FEN/GI - NPO, IVH, replete electrolytes PRN, octreotide daily on TPN, s/p PICC placement 2/27, GI following recs appreciated, electrolytes repleted via TPN  3. PAIN - well controlled, tylenol, oxycodone, motrin PRN  4.  - joseph in place, intermittent periods of leakage of scant amount of stool per vagina, general surgery consulted for recommendations on management of rectovaginal/enterovaginal fistulas, no surgical intervention at this time, and no indication for repeat imaging at this time, continue with nystatin powder and ointment for mons and labial irritation, pain control w/ tylenol/motrin/oxycodone  5. Endocrine- ISS while patient remains NPO, holding home metformin at this time  6. RESP-  Saturating well on room air, encouraged ISS  8. VTE prophylaxis - SCDs, ambulate as tolerated with PT, Lovenox 40qd, PT    9. PT recommending LIT[facility], pt initially did not want to go, now amenable  - PT consulted to walk patient, as patient needs to be ambulating daily and daily exercises   10. Palliative: palliative consulted, appreciate recommendations  - family discussion w/ Dr. Goode 2/28 regarding goals of care, to continue current management, radiation oncology consulted, awaiting official recommendations  - continue with Lexapro 10mg qhs for depressed mood

## 2019-03-07 NOTE — PROGRESS NOTE ADULT - SUBJECTIVE AND OBJECTIVE BOX
Pt evaluated at bedside, reports episodes of vaginal pain and itching overnight unrelieved by tylenol and mildly relieved by motrin. She has been using the nystatin powder as well. She denies fever/chills, HA, dizziness, SOB, CP, palpitations, n/v.    T(C): 36.6 (03-07-19 @ 05:49), Max: 36.6 (03-07-19 @ 05:49)  HR: 81 (03-07-19 @ 05:49) (81 - 81)  BP: 110/70 (03-07-19 @ 05:49) (103/72 - 110/70)  RR: 17 (03-07-19 @ 05:49) (17 - 17)  SpO2: 97% (03-07-19 @ 05:49) (96% - 97%)  GA: NAD, A+OX3  Abd: +BS, soft, NTND, no rebound or guarding, LLQ ostomy w/ green stool and gas  no CVAT  : nystatin powder on perineum, mild erythema noted, clean chux w/o visible stool on pad  Extrem: no calf tenderness, SCDs in place    03-06 @ 07:01  -  03-07 @ 07:00  --------------------------------------------------------  IN: 2196 mL / OUT: 3850 mL / NET: -1654 mL                        9.3    7.25  )-----------( 485      ( 06 Mar 2019 11:02 )             30.5     03-06    137  |  99  |  23  ----------------------------<  73  5.0   |  27  |  0.67    Ca    10.0      06 Mar 2019 11:02  Phos  3.8     03-06  Mg     1.9     03-06    TPro  6.5  /  Alb  3.3  /  TBili  <0.2  /  DBili  x   /  AST  20  /  ALT  13  /  AlkPhos  104  03-05    MEDICATIONS  (STANDING):  chlorhexidine 2% Cloths 1 Application(s) Topical <User Schedule>  dextrose 5%. 1000 milliLiter(s) (50 mL/Hr) IV Continuous <Continuous>  dextrose 50% Injectable 12.5 Gram(s) IV Push once  dextrose 50% Injectable 25 Gram(s) IV Push once  dextrose 50% Injectable 25 Gram(s) IV Push once  enoxaparin Injectable 40 milliGRAM(s) SubCutaneous daily  escitalopram 10 milliGRAM(s) Oral at bedtime  fat emulsion (Plant Based) 20% Infusion 0.81 Gm/kG/Day (20.83 mL/Hr) IV Continuous <Continuous>  insulin lispro (HumaLOG) corrective regimen sliding scale   SubCutaneous every 6 hours  lactated ringers. 1000 milliLiter(s) (100 mL/Hr) IV Continuous <Continuous>  nystatin Powder 1 Application(s) Topical two times a day  pantoprazole  Injectable 40 milliGRAM(s) IV Push every 24 hours  Parenteral Nutrition - Adult 1 Each (83 mL/Hr) TPN Continuous <Continuous>  trimethoprim / sulfamethoxazole IVPB 160 milliGRAM(s) IV Intermittent every 12 hours    MEDICATIONS  (PRN):  acetaminophen   Tablet .. 650 milliGRAM(s) Oral every 6 hours PRN Temp greater or equal to 38C (100.4F), Mild Pain (1 - 3)  dextrose 40% Gel 15 Gram(s) Oral once PRN Blood Glucose LESS THAN 70 milliGRAM(s)/deciliter  glucagon  Injectable 1 milliGRAM(s) IntraMuscular once PRN Glucose LESS THAN 70 milligrams/deciliter  ibuprofen  Tablet. 600 milliGRAM(s) Oral every 6 hours PRN mild pain (1-3) not relieved with Acetaminophen  sodium chloride 0.9% lock flush 10 milliLiter(s) IV Push every 1 hour PRN Pre/post blood products, medications, blood draw, and to maintain line patency

## 2019-03-07 NOTE — PROGRESS NOTE ADULT - SUBJECTIVE AND OBJECTIVE BOX
GYN Progress Note    Patient seen at bedside.  Reported an episode of severe pain that was controlled with one dose of IV dilaudid 0.25mg. Now she reports that she feels well and pain is well controlled.   Currently NPO with TPN running  Has not been OOB today, says that she is waiting for PT to walk with her.   Westbrook in place.    Denies CP, palpitations, SOB, fever, chills, nausea, vomiting.    Vital Signs Last 24 Hrs  T(C): 36.2 (07 Mar 2019 09:04), Max: 36.6 (07 Mar 2019 05:49)  T(F): 97.1 (07 Mar 2019 09:04), Max: 97.8 (07 Mar 2019 05:49)  HR: 73 (07 Mar 2019 09:04) (73 - 81)  BP: 109/73 (07 Mar 2019 09:04) (103/72 - 139/97)  BP(mean): --  RR: 17 (07 Mar 2019 09:04) (17 - 17)  SpO2: 96% (07 Mar 2019 09:04) (95% - 100%)    Physical Exam:  Gen: No Acute Distress  Pulm: Normal work of breathing  GI: soft, nontender; no ostomy output  : fungal infection stable with nystatin; stool noted per introitus  Ext: SCDs in place, wnl    I&O's Summary    06 Mar 2019 07:01  -  07 Mar 2019 07:00  --------------------------------------------------------  IN: 4579.2 mL / OUT: 3850 mL / NET: 729.2 mL    07 Mar 2019 07:01  -  07 Mar 2019 13:24  --------------------------------------------------------  IN: 0 mL / OUT: 1000 mL / NET: -1000 mL      MEDICATIONS  (STANDING):  chlorhexidine 2% Cloths 1 Application(s) Topical <User Schedule>  dextrose 5%. 1000 milliLiter(s) (50 mL/Hr) IV Continuous <Continuous>  dextrose 50% Injectable 12.5 Gram(s) IV Push once  dextrose 50% Injectable 25 Gram(s) IV Push once  dextrose 50% Injectable 25 Gram(s) IV Push once  enoxaparin Injectable 40 milliGRAM(s) SubCutaneous daily  escitalopram 10 milliGRAM(s) Oral at bedtime  insulin lispro (HumaLOG) corrective regimen sliding scale   SubCutaneous every 6 hours  lactated ringers. 1000 milliLiter(s) (100 mL/Hr) IV Continuous <Continuous>  nystatin Powder 1 Application(s) Topical two times a day  pantoprazole  Injectable 40 milliGRAM(s) IV Push every 24 hours  Parenteral Nutrition - Adult 1 Each (83 mL/Hr) TPN Continuous <Continuous>  Parenteral Nutrition - Adult 1 Each (83 mL/Hr) TPN Continuous <Continuous>  trimethoprim / sulfamethoxazole IVPB 160 milliGRAM(s) IV Intermittent every 12 hours    MEDICATIONS  (PRN):  acetaminophen   Tablet .. 650 milliGRAM(s) Oral every 6 hours PRN Temp greater or equal to 38C (100.4F), Mild Pain (1 - 3)  dextrose 40% Gel 15 Gram(s) Oral once PRN Blood Glucose LESS THAN 70 milliGRAM(s)/deciliter  glucagon  Injectable 1 milliGRAM(s) IntraMuscular once PRN Glucose LESS THAN 70 milligrams/deciliter  ibuprofen  Tablet. 600 milliGRAM(s) Oral every 6 hours PRN mild pain (1-3) not relieved with Acetaminophen  sodium chloride 0.9% lock flush 10 milliLiter(s) IV Push every 1 hour PRN Pre/post blood products, medications, blood draw, and to maintain line patency      LABS:                        8.8    7.31  )-----------( 407      ( 07 Mar 2019 10:03 )             29.0     03-07    135  |  98  |  26<H>  ----------------------------<  93  4.8   |  26  |  0.70    Ca    9.9      07 Mar 2019 10:02  Phos  4.4     03-07  Mg     1.9     03-07    TPro  6.9  /  Alb  3.3  /  TBili  <0.2  /  DBili  x   /  AST  18  /  ALT  15  /  AlkPhos  101  03-07

## 2019-03-07 NOTE — PROGRESS NOTE ADULT - ASSESSMENT
58yo G0 w/ known stage IV endometrial adenocarcinoma s/p staging surgery '18 and 1 round of chemotherapy 2/19 admitted for management of symptomatic rectovaginal and enterovaginal fistulas, now with complex fistula associated bacteruria, hemodynamically stable and afebrile    1. ID: urine culture from 2/27 positive for ESBL E. coli, associated due to complex fistula, previously on ceftriaxone but organism resistant, started bactrim DS BID on 3/1, repeat urine cx 3/6 pending, on precautions  2. FEN/GI - NPO, IVH, replete electrolytes PRN, octreotide daily on TPN, s/p PICC placement 2/27, GI following recs appreciated, electrolytes repleted via TPN  3. PAIN - well controlled, tylenol, oxycodone, motrin PRN  4.  - joseph in place, intermittent periods of leakage of scant amount of stool per vagina, general surgery consulted for recommendations on management of rectovaginal/enterovaginal fistulas, no surgical intervention at this time, and no indication for repeat imaging at this time, continue with nystatin powder and ointment for mons and labial irritation, pain control w/ tylenol/motrin/oxycodone  5. Endocrine- ISS while patient remains NPO, holding home metformin at this time  6. RESP-  Saturating well on room air, encouraged ISS  8. VTE prophylaxis - SCDs, ambulate as tolerated with PT, Lovenox 40qd, PT    9. PT recommending LITO, pt initially did not want to go, now amenable  - PT consulted to walk patient, as patient needs to be ambulating daily and daily exercises   10. Palliative: palliative consulted, appreciate recommendations  - family discussion w/ Dr. Spivey and Hugo 2/28 regarding goals of care, to continue current management, radiation oncology consulted  - continue with Lexapro 10mg qhs for depressed mood

## 2019-03-07 NOTE — CHART NOTE - NSCHARTNOTEFT_GEN_A_CORE
Admitting Diagnosis:   Patient is a 59y old  Female who presents with a chief complaint of Rectovaginal and enterovaginal fistulas (06 Mar 2019 11:51)      PAST MEDICAL & SURGICAL HISTORY:      Current Nutrition Order: NPO  TPN via PICC:  230g Dex, 79g AA, 50g 20% lipids 4x/week (1598kcal, 1.65g pro/kg IBW, GIR 2.5g)    PO Intake: Good (%) [   ]  Fair (50-75%) [   ] Poor (<25%) [   ] N/A    GI Issues:   LLQ colostomy  no output today  Denies N/V     Pain:  No pain reported     Skin Integrity:  IAD at perirenal area  Colostomy in place    Labs:       135  |  98  |  26<H>  ----------------------------<  93  4.8   |  26  |  0.70    Ca    9.9      07 Mar 2019 10:02  Phos  4.4       Mg     1.9         TPro  6.9  /  Alb  3.3  /  TBili  <0.2  /  DBili  x   /  AST  18  /  ALT  15  /  AlkPhos  101  -    CAPILLARY BLOOD GLUCOSE      POCT Blood Glucose.: 160 mg/dL (07 Mar 2019 12:14)  POCT Blood Glucose.: 168 mg/dL (07 Mar 2019 06:06)  POCT Blood Glucose.: 151 mg/dL (07 Mar 2019 00:26)  POCT Blood Glucose.: 137 mg/dL (06 Mar 2019 21:46)  POCT Blood Glucose.: 142 mg/dL (06 Mar 2019 16:58)      Medications:  MEDICATIONS  (STANDING):  chlorhexidine 2% Cloths 1 Application(s) Topical <User Schedule>  dextrose 5%. 1000 milliLiter(s) (50 mL/Hr) IV Continuous <Continuous>  dextrose 50% Injectable 12.5 Gram(s) IV Push once  dextrose 50% Injectable 25 Gram(s) IV Push once  dextrose 50% Injectable 25 Gram(s) IV Push once  enoxaparin Injectable 40 milliGRAM(s) SubCutaneous daily  escitalopram 10 milliGRAM(s) Oral at bedtime  insulin lispro (HumaLOG) corrective regimen sliding scale   SubCutaneous every 6 hours  lactated ringers. 1000 milliLiter(s) (100 mL/Hr) IV Continuous <Continuous>  nystatin Powder 1 Application(s) Topical two times a day  pantoprazole  Injectable 40 milliGRAM(s) IV Push every 24 hours  Parenteral Nutrition - Adult 1 Each (83 mL/Hr) TPN Continuous <Continuous>  Parenteral Nutrition - Adult 1 Each (83 mL/Hr) TPN Continuous <Continuous>  trimethoprim / sulfamethoxazole IVPB 160 milliGRAM(s) IV Intermittent every 12 hours  zinc oxide 20% Ointment 1 Application(s) Topical three times a day    MEDICATIONS  (PRN):  acetaminophen   Tablet .. 650 milliGRAM(s) Oral every 6 hours PRN Temp greater or equal to 38C (100.4F), Mild Pain (1 - 3)  dextrose 40% Gel 15 Gram(s) Oral once PRN Blood Glucose LESS THAN 70 milliGRAM(s)/deciliter  glucagon  Injectable 1 milliGRAM(s) IntraMuscular once PRN Glucose LESS THAN 70 milligrams/deciliter  ibuprofen  Tablet. 600 milliGRAM(s) Oral every 6 hours PRN mild pain (1-3) not relieved with Acetaminophen  sodium chloride 0.9% lock flush 10 milliLiter(s) IV Push every 1 hour PRN Pre/post blood products, medications, blood draw, and to maintain line patency      Weight: 136lbs  Height: 5'1", IBW 105lbs+/-10%, %%, BMI 25.7  Daily     Weight Change:   Denies any recent wt changes. Good appetite PTA.  Please trend weights while on TPN     Estimated energy needs:   IBW used for calculations as pt >120% of IBW   Nutrient needs based on [facility] standards of care for maintenance in adults.   Needs adjusted 2/2 catabolic illness, chemo treatments. Fluids adjusted 2/2 colostomy.  1428-1666kcal/day (30-35kcal/kg)  57-67g pro/day (1.2-1.4g/kg)  1428-1666ml fluid/day (30-35ml/kg)    Subjective:   58yo F with stage IV endometrial adenocarcinoma s/p staging surgery ' and 1 round of chemotherapy 3 wks ago. Was planned to complete 2nd cycle of chemo today . Admitted for management of symptomatic rectovaginal and enterovaginal fistulas. Now +MRSA. Per surgery, no surgical intervention at this time. Ordered for octreotide. PICC line placed (). Pt currently ordered for  230g Dex, 79g AA, 50g 20% lipids 4x/week (1598kcal, 1.65g pro/kg IBW, GIR 2.5g). POCT B, 151, 137, 142mg/dL; lytes WNL; Tmg/dL (3/6), 189mg/dL (3/4), 383mg/dL (3/3). Please trend TG 2x/week and monitor need for 20g 20% lipids 4x/week vs 50g. Pt seen resting in bed. Remains NPO. Denies N/V. Denies any output this am. Pt remains understanding of current NPO status with advancement pending medical course. TPN order appropriate. Please obtain new wt to assess adequacy of feeds. Pt is meeting 118% of estimated protein needs. Please see PN recs below. RD to follow.    Previous Nutrition Diagnosis:  increased nutrient needs RT increased demand for kcal/pro AEB catabolic illness, s/p chemo treatment and possible fistula losses.   Active [ x  ]  Resolved [   ]    If resolved, new PES:     Goal:  Pt to consistently meet % of estimated needs via most appropriate route    Recommendations:  1) Continue w/ current PN order: 230g Dex, 67g AA, 50g 20% lipids 4x/week (1550kcal, 1.4g pro/kg IBW, GIR 2.5g).   Recommend checking TG 2x/week. Check Mg, Phos, K daily and POC BG Q6hrs. Trend daily weights. Fluids and lytes per MD discretion.   2)  If TG >400mg/dL, consider switching to 20g 20% lipids vs 50g 4x/week.  3) When PO is deemed medically feasible recommend advancing to Clear Liquid diet w/ EnsureClears TID (720kcal, 24g pro)  *will page for PN recs in morning prior to PN order    Education:   pt understanding of meeting needs via TPN for time being.     Risk Level: High [ x  ] Moderate [   ] Low [   ].

## 2019-03-08 LAB
-  AMIKACIN: SIGNIFICANT CHANGE UP
-  AMPICILLIN/SULBACTAM: SIGNIFICANT CHANGE UP
-  AMPICILLIN: SIGNIFICANT CHANGE UP
-  CEFAZOLIN: SIGNIFICANT CHANGE UP
-  CEFAZOLIN: SIGNIFICANT CHANGE UP
-  CEFTRIAXONE: SIGNIFICANT CHANGE UP
-  CIPROFLOXACIN: SIGNIFICANT CHANGE UP
-  DAPTOMYCIN: SIGNIFICANT CHANGE UP
-  GENTAMICIN: SIGNIFICANT CHANGE UP
-  LINEZOLID: SIGNIFICANT CHANGE UP
-  MEROPENEM: SIGNIFICANT CHANGE UP
-  NITROFURANTOIN: SIGNIFICANT CHANGE UP
-  OXACILLIN: SIGNIFICANT CHANGE UP
-  PENICILLIN: SIGNIFICANT CHANGE UP
-  PIPERACILLIN/TAZOBACTAM: SIGNIFICANT CHANGE UP
-  RIFAMPIN: SIGNIFICANT CHANGE UP
-  TETRACYCLINE: SIGNIFICANT CHANGE UP
-  TOBRAMYCIN: SIGNIFICANT CHANGE UP
-  TRIMETHOPRIM/SULFAMETHOXAZOLE: SIGNIFICANT CHANGE UP
-  TRIMETHOPRIM/SULFAMETHOXAZOLE: SIGNIFICANT CHANGE UP
-  VANCOMYCIN: SIGNIFICANT CHANGE UP
ALBUMIN SERPL ELPH-MCNC: 3.4 G/DL — SIGNIFICANT CHANGE UP (ref 3.3–5)
ALP SERPL-CCNC: 104 U/L — SIGNIFICANT CHANGE UP (ref 40–120)
ALT FLD-CCNC: 13 U/L — SIGNIFICANT CHANGE UP (ref 10–45)
ANION GAP SERPL CALC-SCNC: 10 MMOL/L — SIGNIFICANT CHANGE UP (ref 5–17)
AST SERPL-CCNC: 16 U/L — SIGNIFICANT CHANGE UP (ref 10–40)
BASOPHILS # BLD AUTO: 0.03 K/UL — SIGNIFICANT CHANGE UP (ref 0–0.2)
BASOPHILS NFR BLD AUTO: 0.4 % — SIGNIFICANT CHANGE UP (ref 0–2)
BILIRUB SERPL-MCNC: 0.2 MG/DL — SIGNIFICANT CHANGE UP (ref 0.2–1.2)
BUN SERPL-MCNC: 24 MG/DL — HIGH (ref 7–23)
CALCIUM SERPL-MCNC: 9.9 MG/DL — SIGNIFICANT CHANGE UP (ref 8.4–10.5)
CHLORIDE SERPL-SCNC: 97 MMOL/L — SIGNIFICANT CHANGE UP (ref 96–108)
CO2 SERPL-SCNC: 28 MMOL/L — SIGNIFICANT CHANGE UP (ref 22–31)
CREAT SERPL-MCNC: 0.63 MG/DL — SIGNIFICANT CHANGE UP (ref 0.5–1.3)
CULTURE RESULTS: SIGNIFICANT CHANGE UP
EOSINOPHIL # BLD AUTO: 0.19 K/UL — SIGNIFICANT CHANGE UP (ref 0–0.5)
EOSINOPHIL NFR BLD AUTO: 2.7 % — SIGNIFICANT CHANGE UP (ref 0–6)
GLUCOSE BLDC GLUCOMTR-MCNC: 120 MG/DL — HIGH (ref 70–99)
GLUCOSE BLDC GLUCOMTR-MCNC: 155 MG/DL — HIGH (ref 70–99)
GLUCOSE BLDC GLUCOMTR-MCNC: 163 MG/DL — HIGH (ref 70–99)
GLUCOSE BLDC GLUCOMTR-MCNC: 165 MG/DL — HIGH (ref 70–99)
GLUCOSE BLDC GLUCOMTR-MCNC: 95 MG/DL — SIGNIFICANT CHANGE UP (ref 70–99)
GLUCOSE SERPL-MCNC: 102 MG/DL — HIGH (ref 70–99)
HCT VFR BLD CALC: 28.3 % — LOW (ref 34.5–45)
HGB BLD-MCNC: 8.7 G/DL — LOW (ref 11.5–15.5)
IMM GRANULOCYTES NFR BLD AUTO: 0.6 % — SIGNIFICANT CHANGE UP (ref 0–1.5)
LYMPHOCYTES # BLD AUTO: 1.79 K/UL — SIGNIFICANT CHANGE UP (ref 1–3.3)
LYMPHOCYTES # BLD AUTO: 25 % — SIGNIFICANT CHANGE UP (ref 13–44)
MAGNESIUM SERPL-MCNC: 1.8 MG/DL — SIGNIFICANT CHANGE UP (ref 1.6–2.6)
MCHC RBC-ENTMCNC: 29.1 PG — SIGNIFICANT CHANGE UP (ref 27–34)
MCHC RBC-ENTMCNC: 30.7 GM/DL — LOW (ref 32–36)
MCV RBC AUTO: 94.6 FL — SIGNIFICANT CHANGE UP (ref 80–100)
METHOD TYPE: SIGNIFICANT CHANGE UP
MONOCYTES # BLD AUTO: 0.86 K/UL — SIGNIFICANT CHANGE UP (ref 0–0.9)
MONOCYTES NFR BLD AUTO: 12 % — SIGNIFICANT CHANGE UP (ref 2–14)
NEUTROPHILS # BLD AUTO: 4.24 K/UL — SIGNIFICANT CHANGE UP (ref 1.8–7.4)
NEUTROPHILS NFR BLD AUTO: 59.3 % — SIGNIFICANT CHANGE UP (ref 43–77)
NRBC # BLD: 0 /100 WBCS — SIGNIFICANT CHANGE UP (ref 0–0)
ORGANISM # SPEC MICROSCOPIC CNT: SIGNIFICANT CHANGE UP
PHOSPHATE SERPL-MCNC: 4.2 MG/DL — SIGNIFICANT CHANGE UP (ref 2.5–4.5)
PLATELET # BLD AUTO: 413 K/UL — HIGH (ref 150–400)
POTASSIUM SERPL-MCNC: 4.8 MMOL/L — SIGNIFICANT CHANGE UP (ref 3.5–5.3)
POTASSIUM SERPL-SCNC: 4.8 MMOL/L — SIGNIFICANT CHANGE UP (ref 3.5–5.3)
PROT SERPL-MCNC: 7.2 G/DL — SIGNIFICANT CHANGE UP (ref 6–8.3)
RBC # BLD: 2.99 M/UL — LOW (ref 3.8–5.2)
RBC # FLD: 17.3 % — HIGH (ref 10.3–14.5)
SODIUM SERPL-SCNC: 135 MMOL/L — SIGNIFICANT CHANGE UP (ref 135–145)
SPECIMEN SOURCE: SIGNIFICANT CHANGE UP
WBC # BLD: 7.15 K/UL — SIGNIFICANT CHANGE UP (ref 3.8–10.5)
WBC # FLD AUTO: 7.15 K/UL — SIGNIFICANT CHANGE UP (ref 3.8–10.5)

## 2019-03-08 PROCEDURE — 99231 SBSQ HOSP IP/OBS SF/LOW 25: CPT

## 2019-03-08 RX ORDER — I.V. FAT EMULSION 20 G/100ML
50 EMULSION INTRAVENOUS ONCE
Qty: 0 | Refills: 0 | Status: COMPLETED | OUTPATIENT
Start: 2019-03-08 | End: 2019-03-08

## 2019-03-08 RX ORDER — ELECTROLYTE SOLUTION,INJ
1 VIAL (ML) INTRAVENOUS
Qty: 0 | Refills: 0 | Status: DISCONTINUED | OUTPATIENT
Start: 2019-03-08 | End: 2019-03-08

## 2019-03-08 RX ADMIN — ZINC OXIDE 1 APPLICATION(S): 200 OINTMENT TOPICAL at 06:31

## 2019-03-08 RX ADMIN — Medication 2: at 06:27

## 2019-03-08 RX ADMIN — NYSTATIN CREAM 1 APPLICATION(S): 100000 CREAM TOPICAL at 06:31

## 2019-03-08 RX ADMIN — Medication 260 MILLIGRAM(S): at 06:27

## 2019-03-08 RX ADMIN — Medication 2: at 17:45

## 2019-03-08 RX ADMIN — ZINC OXIDE 1 APPLICATION(S): 200 OINTMENT TOPICAL at 22:54

## 2019-03-08 RX ADMIN — I.V. FAT EMULSION 20.83 GRAM(S): 20 EMULSION INTRAVENOUS at 22:53

## 2019-03-08 RX ADMIN — Medication 2: at 00:39

## 2019-03-08 RX ADMIN — PANTOPRAZOLE SODIUM 40 MILLIGRAM(S): 20 TABLET, DELAYED RELEASE ORAL at 06:26

## 2019-03-08 RX ADMIN — SODIUM CHLORIDE 100 MILLILITER(S): 9 INJECTION, SOLUTION INTRAVENOUS at 17:46

## 2019-03-08 RX ADMIN — ZINC OXIDE 1 APPLICATION(S): 200 OINTMENT TOPICAL at 14:03

## 2019-03-08 RX ADMIN — NYSTATIN CREAM 1 APPLICATION(S): 100000 CREAM TOPICAL at 17:46

## 2019-03-08 RX ADMIN — Medication 1 EACH: at 17:46

## 2019-03-08 RX ADMIN — ENOXAPARIN SODIUM 40 MILLIGRAM(S): 100 INJECTION SUBCUTANEOUS at 12:39

## 2019-03-08 RX ADMIN — CHLORHEXIDINE GLUCONATE 1 APPLICATION(S): 213 SOLUTION TOPICAL at 06:30

## 2019-03-08 NOTE — CONSULT NOTE ADULT - ATTENDING COMMENTS
ID Attending, Case discussed with GYN Resident, Pt examined    60 yo woman with Hx. 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 She noted abnormal drainage about 2 weeks ago and was admitted to OSH, transferred here.    Upon arrival here, a Westbrook Catheter was placed (per pt history): although her initial urine culture was sterile, subsequent cultures from chronic Westbrook have grown a series of ESBL organisms, initially susceptible to TMP-SMX, with which she was treated, but now urine is growing ESBL P. mirabilis and MRSA, both now resistant to TMP-SMX.    It should be noted that NO enterovesical or vesicovaginal fistula has been demonstrated on her radiographic studies.  Whether the Westbrook catheter is thought to be playing some role in the healing of the enterovaginal fistula is unclear to me.    Pt has been afebrile with a normal WBC throughout.  She has no bladder tenderness or CVA tenderness    Comment: asymptomatic bacteriuria in a chronically catheterized patient need not be treated.  If Westbrook can be removed, OR if she develops fever or leukocytosis, treatment would be indicated.    Recommend:  1. D/C antibiotics  2. follow fever curve, WBC  3. if possible, remove Westbrook catheter  4. Consider a poppy seed test for fistula to bladder: give I tablespoon of poppy seeds in apple sauce p.o.; if poppy seeds appear in the urine in 48-72 hours, a fistula is demonstrated.      Please call ID if we may be of further assistance

## 2019-03-08 NOTE — PROGRESS NOTE ADULT - ASSESSMENT
58yo G0 w/ known stage IV endometrial adenocarcinoma s/p staging surgery '18 and 1 round of chemotherapy 2/19 admitted for management of symptomatic rectovaginal and enterovaginal fistulas, now with complex fistula associated bacteruria, hemodynamically stable and afebrile    1. ID: urine culture from 2/27 positive for ESBL E. coli, associated due to complex fistula, on bactrim DS BID on 3/1, repeat urine cx 3/6 w/ MRSA and proteus resistant to bactrim, ID consulted, recommendations appreciated, bactrim stopped, can expectantly manage asymptomatic bacteuria   2. FEN/GI - NPO, IVH, replete electrolytes PRN, octreotide daily on TPN, s/p PICC placement 2/27, GI following recs appreciated, electrolytes repleted via TPN  3. PAIN - well controlled, tylenol, oxycodone, motrin PRN  4.  - joseph in place, intermittent periods of leakage of scant amount of stool per vagina, general surgery consulted for recommendations on management of rectovaginal/enterovaginal fistulas, no surgical intervention at this time, continue with nystatin powder and ointment for mons and labial irritation as well as zinc oxide  5. Endocrine- ISS while patient remains NPO, holding home metformin at this time  6. RESP-  Saturating well on room air, encouraged ISS  8. VTE prophylaxis - SCDs, ambulate as tolerated with PT, Lovenox 40qd, PT    9. PT recommending LITO, pt initially did not want to go, now amenable  - PT consulted to walk patient, as patient needs to be ambulating daily and daily exercises, discussed w/ social work and case management that she needs daily walking with PT in order to be eligible for discharge home w/ TPN  10. Goals of care:  - palliative care consulted on admission and gave their recommendations, no longer following  - in order to be eligible for chemotherapy, functional status needs to improve  - radiation oncology consulted, recommended biopsy to confirm recurrence before considering radiation therapy  - continue with Lexapro 10mg qhs for depressed mood

## 2019-03-08 NOTE — PROGRESS NOTE ADULT - SUBJECTIVE AND OBJECTIVE BOX
Pt evaluated at bedside, reports vaginal pain and discomfort now improved. She denies fever/chills, HA, dizziness, SOB, CP, palpitations, n/v, abdominal pain. She ambulated out of bed to chair and walked w/ PT yesterday.    T(C): 37.3 (03-08-19 @ 05:44), Max: 37.3 (03-08-19 @ 05:44)  HR: 80 (03-08-19 @ 05:44) (74 - 80)  BP: 112/76 (03-08-19 @ 05:44) (112/76 - 134/89)  RR: 17 (03-08-19 @ 05:44) (17 - 17)  SpO2: 98% (03-08-19 @ 05:44) (98% - 100%)  GA: NAD, A+OX3  Abd: +BS, soft, NTND, no rebound or guarding, LLQ ostomy w/ green stool and gas  no CVAT  : nystatin powder on perineum, mild erythema noted, clean chux w/o visible stool on pad  Extrem: no calf tenderness, SCDs in place    03-07 @ 07:01  -  03-08 @ 07:00  --------------------------------------------------------  IN: 260 mL / OUT: 4350 mL / NET: -4090 mL                        8.8    7.31  )-----------( 407      ( 07 Mar 2019 10:03 )             29.0     03-07    135  |  98  |  26<H>  ----------------------------<  93  4.8   |  26  |  0.70    Ca    9.9      07 Mar 2019 10:02  Phos  4.4     03-07  Mg     1.9     03-07    TPro  6.9  /  Alb  3.3  /  TBili  <0.2  /  DBili  x   /  AST  18  /  ALT  15  /  AlkPhos  101  03-07    MEDICATIONS  (STANDING):  chlorhexidine 2% Cloths 1 Application(s) Topical <User Schedule>  dextrose 5%. 1000 milliLiter(s) (50 mL/Hr) IV Continuous <Continuous>  dextrose 50% Injectable 12.5 Gram(s) IV Push once  dextrose 50% Injectable 25 Gram(s) IV Push once  dextrose 50% Injectable 25 Gram(s) IV Push once  enoxaparin Injectable 40 milliGRAM(s) SubCutaneous daily  escitalopram 10 milliGRAM(s) Oral at bedtime  insulin lispro (HumaLOG) corrective regimen sliding scale   SubCutaneous every 6 hours  lactated ringers. 1000 milliLiter(s) (100 mL/Hr) IV Continuous <Continuous>  nystatin Powder 1 Application(s) Topical two times a day  pantoprazole  Injectable 40 milliGRAM(s) IV Push every 24 hours  Parenteral Nutrition - Adult 1 Each (83 mL/Hr) TPN Continuous <Continuous>  trimethoprim / sulfamethoxazole IVPB 160 milliGRAM(s) IV Intermittent every 12 hours  zinc oxide 20% Ointment 1 Application(s) Topical three times a day    MEDICATIONS  (PRN):  acetaminophen   Tablet .. 650 milliGRAM(s) Oral every 6 hours PRN Temp greater or equal to 38C (100.4F), Mild Pain (1 - 3)  dextrose 40% Gel 15 Gram(s) Oral once PRN Blood Glucose LESS THAN 70 milliGRAM(s)/deciliter  glucagon  Injectable 1 milliGRAM(s) IntraMuscular once PRN Glucose LESS THAN 70 milligrams/deciliter  ibuprofen  Tablet. 600 milliGRAM(s) Oral every 6 hours PRN mild pain (1-3) not relieved with Acetaminophen  sodium chloride 0.9% lock flush 10 milliLiter(s) IV Push every 1 hour PRN Pre/post blood products, medications, blood draw, and to maintain line patency

## 2019-03-08 NOTE — PROGRESS NOTE ADULT - SUBJECTIVE AND OBJECTIVE BOX
Pt evaluated at bedside, reports feeling well, denies vaginal pain or irritation.  She denies HA, dizziness, SOB, CP, palpitations, n/v, abdominal pain. She has ambulated out of bed to chair but has not walked today.    T(C): 37.3 (03-08-19 @ 05:44), Max: 37.3 (03-08-19 @ 05:44)  HR: 80 (03-08-19 @ 05:44) (80 - 80)  BP: 112/76 (03-08-19 @ 05:44) (112/76 - 112/76)  RR: 17 (03-08-19 @ 05:44) (17 - 17)  SpO2: 98% (03-08-19 @ 05:44) (98% - 98%)  GA: NAD, A+OX3  Abd: +BS, soft, NTND, no rebound or guarding, LLQ ostomy w/ green stool and gas  no CVAT  : nystatin powder on perineum, mild erythema noted, clean chux w/o visible stool on pad  Extrem: no calf tenderness, SCDs in place    03-07 @ 07:01  -  03-08 @ 07:00  --------------------------------------------------------  IN: 260 mL / OUT: 4350 mL / NET: -4090 mL    03-08 @ 07:01  -  03-08 @ 14:30  --------------------------------------------------------  IN: 0 mL / OUT: 0 mL / NET: 0 mL                        8.7    7.15  )-----------( 413      ( 08 Mar 2019 09:48 )             28.3     03-08    135  |  97  |  24<H>  ----------------------------<  102<H>  4.8   |  28  |  0.63    Ca    9.9      08 Mar 2019 09:47  Phos  4.2     03-08  Mg     1.8     03-08    TPro  7.2  /  Alb  3.4  /  TBili  0.2  /  DBili  x   /  AST  16  /  ALT  13  /  AlkPhos  104  03-08    MEDICATIONS  (STANDING):  chlorhexidine 2% Cloths 1 Application(s) Topical <User Schedule>  dextrose 5%. 1000 milliLiter(s) (50 mL/Hr) IV Continuous <Continuous>  dextrose 50% Injectable 12.5 Gram(s) IV Push once  dextrose 50% Injectable 25 Gram(s) IV Push once  dextrose 50% Injectable 25 Gram(s) IV Push once  enoxaparin Injectable 40 milliGRAM(s) SubCutaneous daily  escitalopram 10 milliGRAM(s) Oral at bedtime  fat emulsion (Plant Based) 20% IVPB 50 Gram(s) IV Intermittent once  insulin lispro (HumaLOG) corrective regimen sliding scale   SubCutaneous every 6 hours  lactated ringers. 1000 milliLiter(s) (100 mL/Hr) IV Continuous <Continuous>  nystatin Powder 1 Application(s) Topical two times a day  pantoprazole  Injectable 40 milliGRAM(s) IV Push every 24 hours  Parenteral Nutrition - Adult 1 Each (83 mL/Hr) TPN Continuous <Continuous>  Parenteral Nutrition - Adult 1 Each (83 mL/Hr) TPN Continuous <Continuous>  zinc oxide 20% Ointment 1 Application(s) Topical three times a day    MEDICATIONS  (PRN):  acetaminophen   Tablet .. 650 milliGRAM(s) Oral every 6 hours PRN Temp greater or equal to 38C (100.4F), Mild Pain (1 - 3)  dextrose 40% Gel 15 Gram(s) Oral once PRN Blood Glucose LESS THAN 70 milliGRAM(s)/deciliter  glucagon  Injectable 1 milliGRAM(s) IntraMuscular once PRN Glucose LESS THAN 70 milligrams/deciliter  ibuprofen  Tablet. 600 milliGRAM(s) Oral every 6 hours PRN mild pain (1-3) not relieved with Acetaminophen  sodium chloride 0.9% lock flush 10 milliLiter(s) IV Push every 1 hour PRN Pre/post blood products, medications, blood draw, and to maintain line patency

## 2019-03-08 NOTE — CONSULT NOTE ADULT - SUBJECTIVE AND OBJECTIVE BOX
HPI:  60yo G0 with PMHx significant for breast cancer, diabetes mellitus, endometrial cancer stage 3, s/p exploratory laparotomy, enterolysis, SHAMIR, BSO, pelvic lymphadenectomy, low anterior resection mobilization of splenic flexure, end colostomy on 7/30/18 now with rectovaginal fistula.  She was admitted to [place] one week ago for treatment of UTI, and now transferred to [facility] for further care.     Pt denies fever, chills, chest pain, SOB, abdominal pain, nausea, vomiting, vaginal bleeding.  She reports having pain and discomfort around her perineum.  She reports being completely incontinent of urine.  Approximately 1 week ago she had leakage of stool from vagina, and was subsequently admitted to [place] where she reports receiving IV antibiotics.  She has not been ambulatory for the past month since discharge from her rehab facility.  She has undergone one round of chemotherapy approximately 3 weeks ago; to complete second cycle of chemotherapy this monday, 2/25.  Enterovaginal and rectovaginal fistulization found on MRI on 2/18/19 at [place].    OB/GYN Hx: G0, endometrial cancer dx in 7/2018; h/o breast cancer in 2009 s/p chemo and radiation with left breast lumpectomy  PMHx: T2DM, polio in childhood, h/o breast cancer  SHx: left breast lumpectomy, right knee surgery with screw placement 2001, 7/2018 exploratory laparotomy, enterolysis, SHAMIR, BSO, pelvic lymphadenectomy, low anterior resection mobilization of splenic flexure, end colostomy   Meds: metformin 22103 BID, nexium  Allergies: NKDA    PHYSICAL EXAM:   Vital Signs Last 24 Hrs  T(C): 36.2 (22 Feb 2019 20:10), Max: 36.2 (22 Feb 2019 20:10)  T(F): 97.2 (22 Feb 2019 20:10), Max: 97.2 (22 Feb 2019 20:10)  HR: 91 (22 Feb 2019 20:10) (91 - 91)  BP: 141/93 (22 Feb 2019 20:10) (141/93 - 141/93)  BP(mean): --  RR: 17 (22 Feb 2019 20:10) (17 - 17)  SpO2: 96% (22 Feb 2019 20:10) (96% - 96%)    **************************  Constitutional: Alert & Oriented x3, No acute distress  Respiratory:  regular work of breathing, CTAB  Cardiovascular: regular rate and rhythm, no murmurs, or gallops  Gastrointestinal: soft, non tender, positive bowel sounds, no rebound or guarding, left ostomy perfused well with minimal stool output in bag; midline scar visulized  Pelvic exam: deferred due to patient's discomfort   Extremities: no calf tenderness or swelling      LABS:                        8.3    4.90  )-----------( 424      ( 22 Feb 2019 22:07 )             26.2     02-22    140  |  102  |  9   ----------------------------<  197<H>  3.4<L>   |  27  |  0.78    Ca    9.1      22 Feb 2019 22:07    TPro  6.7  /  Alb  3.1<L>  /  TBili  0.2  /  DBili  x   /  AST  10  /  ALT  9<L>  /  AlkPhos  69  02-22    PT/INR - ( 22 Feb 2019 22:07 )   PT: 12.0 sec;   INR: 1.06          PTT - ( 22 Feb 2019 22:07 )  PTT:30.9 sec (23 Feb 2019 02:13)      PAST MEDICAL & SURGICAL HISTORY:        REVIEW OF SYSTEMS:    General:	 no weakness; no fevers, no chills  Skin/Breast: no rash  Respiratory and Thorax: no SOB, no cough  Cardiovascular:	No chest pain  Gastrointestinal:	 no nausea, vomiting , diarrhea  Genitourinary:	no dysuria, no difficulty urinating, no hematuria  Musculoskeletal:	no weakness, no joint swelling/pain  Neurological:	no focal weakness/numbness  Endocrine:	no polyuria, no polydipsia      ANTIBIOTICS:  MEDICATIONS  (STANDING):  chlorhexidine 2% Cloths 1 Application(s) Topical <User Schedule>  dextrose 5%. 1000 milliLiter(s) (50 mL/Hr) IV Continuous <Continuous>  dextrose 50% Injectable 12.5 Gram(s) IV Push once  dextrose 50% Injectable 25 Gram(s) IV Push once  dextrose 50% Injectable 25 Gram(s) IV Push once  enoxaparin Injectable 40 milliGRAM(s) SubCutaneous daily  escitalopram 10 milliGRAM(s) Oral at bedtime  insulin lispro (HumaLOG) corrective regimen sliding scale   SubCutaneous every 6 hours  lactated ringers. 1000 milliLiter(s) (100 mL/Hr) IV Continuous <Continuous>  nystatin Powder 1 Application(s) Topical two times a day  pantoprazole  Injectable 40 milliGRAM(s) IV Push every 24 hours  Parenteral Nutrition - Adult 1 Each (83 mL/Hr) TPN Continuous <Continuous>  trimethoprim / sulfamethoxazole IVPB 160 milliGRAM(s) IV Intermittent every 12 hours  zinc oxide 20% Ointment 1 Application(s) Topical three times a day    MEDICATIONS  (PRN):  acetaminophen   Tablet .. 650 milliGRAM(s) Oral every 6 hours PRN Temp greater or equal to 38C (100.4F), Mild Pain (1 - 3)  dextrose 40% Gel 15 Gram(s) Oral once PRN Blood Glucose LESS THAN 70 milliGRAM(s)/deciliter  glucagon  Injectable 1 milliGRAM(s) IntraMuscular once PRN Glucose LESS THAN 70 milligrams/deciliter  ibuprofen  Tablet. 600 milliGRAM(s) Oral every 6 hours PRN mild pain (1-3) not relieved with Acetaminophen  sodium chloride 0.9% lock flush 10 milliLiter(s) IV Push every 1 hour PRN Pre/post blood products, medications, blood draw, and to maintain line patency      Allergies    Allergy Status Unknown    Intolerances        SOCIAL HISTORY:    FAMILY HISTORY:      Vital Signs Last 24 Hrs  T(C): 37.3 (08 Mar 2019 05:44), Max: 37.3 (08 Mar 2019 05:44)  T(F): 99.1 (08 Mar 2019 05:44), Max: 99.1 (08 Mar 2019 05:44)  HR: 80 (08 Mar 2019 05:44) (67 - 80)  BP: 112/76 (08 Mar 2019 05:44) (11/78 - 134/89)  BP(mean): --  RR: 17 (08 Mar 2019 05:44) (17 - 17)  SpO2: 98% (08 Mar 2019 05:44) (96% - 100%)    03-07-19 @ 07:01  -  03-08-19 @ 07:00  --------------------------------------------------------  IN: 260 mL / OUT: 4350 mL / NET: -4090 mL        PHYSICAL EXAM:  Constitutional:Well-developed, well nourished  Eyes:THOMAS, EOMI  Ear/Nose/Throat: no oral lesion, no sinus tenderness on percussion	  Neck:no JVD, no lymphadenopathy, supple  Respiratory: CTA heath  Cardiovascular: S1S2 RRR, no murmurs  Gastrointestinal:soft, (+) BS, no HSM  Extremities:no e/e/c  Vascular: DP Pulse:	right normal; left normal            LABS:                        8.7    7.15  )-----------( 413      ( 08 Mar 2019 09:48 )             28.3     03-08    135  |  97  |  24<H>  ----------------------------<  102<H>  4.8   |  28  |  0.63    Ca    9.9      08 Mar 2019 09:47  Phos  4.2     03-08  Mg     1.8     03-08    TPro  7.2  /  Alb  3.4  /  TBili  0.2  /  DBili  x   /  AST  16  /  ALT  13  /  AlkPhos  104  03-08          MICROBIOLOGY:  RADIOLOGY & ADDITIONAL STUDIES: HPI:  58yo G0 with PMHx significant for breast cancer, diabetes mellitus, endometrial cancer stage 3, s/p exploratory laparotomy, enterolysis, SHAMIR, BSO, pelvic lymphadenectomy, low anterior resection mobilization of splenic flexure, end colostomy on 7/30/18 now with rectovaginal fistula.  She was admitted to [place] one week ago for treatment of UTI, and now transferred to [facility] for further care. Pt denies fever, chills, chest pain, SOB, abdominal pain, nausea, vomiting, vaginal bleeding.  She reports having pain and discomfort around her perineum.  She reports being completely incontinent of urine.  Approximately 1 week ago she had leakage of stool from vagina, and was subsequently admitted to [place] where she reports receiving IV antibiotics.  She has not been ambulatory for the past month since discharge from her rehab facility.  She has undergone one round of chemotherapy approximately 3 weeks ago; to complete second cycle of chemotherapy this monday, 2/25.  Enterovaginal and rectovaginal fistulization found on MRI on 2/18/19 at [place]. Patient transferred for my specialized care from [facility] under GYN ONC service. Patient had Ucx with no growth on admission. Joseph subsequently placed on admission for patient comfort and management of rectovaginal fistula. Patient had repeat urine cx 2/27 ESBL ECOLI. Started on Bactrim. Patient repeat urine cx 3/6 growing proteus ESBL and MRSA. Last two cultures from indwelling joseph. No acute surgical intervention to be done for recto-vaginal fistula. ID consulted for management of these cultures.     Vital Signs Last 24 Hrs  T(C): 36.2 (22 Feb 2019 20:10), Max: 36.2 (22 Feb 2019 20:10)  T(F): 97.2 (22 Feb 2019 20:10), Max: 97.2 (22 Feb 2019 20:10)  HR: 91 (22 Feb 2019 20:10) (91 - 91)  BP: 141/93 (22 Feb 2019 20:10) (141/93 - 141/93)  BP(mean): --  RR: 17 (22 Feb 2019 20:10) (17 - 17)  SpO2: 96% (22 Feb 2019 20:10) (96% - 96%)    Physical Exam:   General: pleasant middle aged woman sitting in chair, NAD  HEENT: MMM, conjunctiva and sclera clear  Gastrointestinal: Soft, non-tender ; Normal bowel sounds; No rebound or guarding  ostomy bag with brown stool  : no suprapubic tenderness, or CVA tenderness  Extremities: Normal range of motion, No clubbing, cyanosis or edema  Neurological: Alert and oriented x3  Skin: Warm and dry. No obvious rash      LABS:                        8.3    4.90  )-----------( 424      ( 22 Feb 2019 22:07 )             26.2     02-22    140  |  102  |  9   ----------------------------<  197<H>  3.4<L>   |  27  |  0.78    Ca    9.1      22 Feb 2019 22:07    TPro  6.7  /  Alb  3.1<L>  /  TBili  0.2  /  DBili  x   /  AST  10  /  ALT  9<L>  /  AlkPhos  69  02-22    PT/INR - ( 22 Feb 2019 22:07 )   PT: 12.0 sec;   INR: 1.06          PTT - ( 22 Feb 2019 22:07 )  PTT:30.9 sec (23 Feb 2019 02:13)    ANTIBIOTICS:  Bactrim 160mg q 12 hours (3/1-present)  MEDICATIONS  (STANDING):  chlorhexidine 2% Cloths 1 Application(s) Topical <User Schedule>  dextrose 5%. 1000 milliLiter(s) (50 mL/Hr) IV Continuous <Continuous>  dextrose 50% Injectable 12.5 Gram(s) IV Push once  dextrose 50% Injectable 25 Gram(s) IV Push once  dextrose 50% Injectable 25 Gram(s) IV Push once  enoxaparin Injectable 40 milliGRAM(s) SubCutaneous daily  escitalopram 10 milliGRAM(s) Oral at bedtime  insulin lispro (HumaLOG) corrective regimen sliding scale   SubCutaneous every 6 hours  lactated ringers. 1000 milliLiter(s) (100 mL/Hr) IV Continuous <Continuous>  nystatin Powder 1 Application(s) Topical two times a day  pantoprazole  Injectable 40 milliGRAM(s) IV Push every 24 hours  Parenteral Nutrition - Adult 1 Each (83 mL/Hr) TPN Continuous <Continuous>  trimethoprim / sulfamethoxazole IVPB 160 milliGRAM(s) IV Intermittent every 12 hours  zinc oxide 20% Ointment 1 Application(s) Topical three times a day    MEDICATIONS  (PRN):  acetaminophen   Tablet .. 650 milliGRAM(s) Oral every 6 hours PRN Temp greater or equal to 38C (100.4F), Mild Pain (1 - 3)  dextrose 40% Gel 15 Gram(s) Oral once PRN Blood Glucose LESS THAN 70 milliGRAM(s)/deciliter  glucagon  Injectable 1 milliGRAM(s) IntraMuscular once PRN Glucose LESS THAN 70 milligrams/deciliter  ibuprofen  Tablet. 600 milliGRAM(s) Oral every 6 hours PRN mild pain (1-3) not relieved with Acetaminophen  sodium chloride 0.9% lock flush 10 milliLiter(s) IV Push every 1 hour PRN Pre/post blood products, medications, blood draw, and to maintain line patency    MICROBIOLOGY: Reviewed  RADIOLOGY & ADDITIONAL STUDIES: Reviewed

## 2019-03-08 NOTE — CONSULT NOTE ADULT - ASSESSMENT
58 yo woman with Hx. 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 She noted abnormal drainage about 2 weeks ago and was admitted to [facility] transferred here for more specialized care. Upon arrival joseph catheter placed, with initial urine culture negative. Patient with two subsequent cultures growing ESBL and MRSA. Started on Bactrim for asymptomatic bacturia. Most recent cultures with Bactrim resistance. ID consulted for abx regimen given this culture data.  Recommend:   - D/C antibiotics, asymptomatic bacturia from indwelling joseph   - if able remove joseph catheter would be an indication to treat for UTI, or if patient develops fevers or increasing white count   - consider poppy seed test for fistula to bladder: give 1 tablespoon of poppy seeds in apple sauce p.o.; if poppy seeds appear in the urine in 48-72 hours, a fistula is demonstrated.    Please call ID team 1 if we may be of further assistance

## 2019-03-09 LAB
ANION GAP SERPL CALC-SCNC: 11 MMOL/L — SIGNIFICANT CHANGE UP (ref 5–17)
BASOPHILS # BLD AUTO: 0.03 K/UL — SIGNIFICANT CHANGE UP (ref 0–0.2)
BASOPHILS NFR BLD AUTO: 0.5 % — SIGNIFICANT CHANGE UP (ref 0–2)
BUN SERPL-MCNC: 23 MG/DL — SIGNIFICANT CHANGE UP (ref 7–23)
CALCIUM SERPL-MCNC: 10 MG/DL — SIGNIFICANT CHANGE UP (ref 8.4–10.5)
CHLORIDE SERPL-SCNC: 99 MMOL/L — SIGNIFICANT CHANGE UP (ref 96–108)
CO2 SERPL-SCNC: 27 MMOL/L — SIGNIFICANT CHANGE UP (ref 22–31)
CREAT SERPL-MCNC: 0.59 MG/DL — SIGNIFICANT CHANGE UP (ref 0.5–1.3)
EOSINOPHIL # BLD AUTO: 0.17 K/UL — SIGNIFICANT CHANGE UP (ref 0–0.5)
EOSINOPHIL NFR BLD AUTO: 2.8 % — SIGNIFICANT CHANGE UP (ref 0–6)
GLUCOSE BLDC GLUCOMTR-MCNC: 114 MG/DL — HIGH (ref 70–99)
GLUCOSE BLDC GLUCOMTR-MCNC: 150 MG/DL — HIGH (ref 70–99)
GLUCOSE BLDC GLUCOMTR-MCNC: 159 MG/DL — HIGH (ref 70–99)
GLUCOSE BLDC GLUCOMTR-MCNC: 163 MG/DL — HIGH (ref 70–99)
GLUCOSE BLDC GLUCOMTR-MCNC: 174 MG/DL — HIGH (ref 70–99)
GLUCOSE SERPL-MCNC: 138 MG/DL — HIGH (ref 70–99)
HCT VFR BLD CALC: 29.2 % — LOW (ref 34.5–45)
HGB BLD-MCNC: 8.9 G/DL — LOW (ref 11.5–15.5)
IMM GRANULOCYTES NFR BLD AUTO: 0.5 % — SIGNIFICANT CHANGE UP (ref 0–1.5)
LYMPHOCYTES # BLD AUTO: 1.5 K/UL — SIGNIFICANT CHANGE UP (ref 1–3.3)
LYMPHOCYTES # BLD AUTO: 24.8 % — SIGNIFICANT CHANGE UP (ref 13–44)
MAGNESIUM SERPL-MCNC: 1.8 MG/DL — SIGNIFICANT CHANGE UP (ref 1.6–2.6)
MCHC RBC-ENTMCNC: 29.1 PG — SIGNIFICANT CHANGE UP (ref 27–34)
MCHC RBC-ENTMCNC: 30.5 GM/DL — LOW (ref 32–36)
MCV RBC AUTO: 95.4 FL — SIGNIFICANT CHANGE UP (ref 80–100)
MONOCYTES # BLD AUTO: 0.64 K/UL — SIGNIFICANT CHANGE UP (ref 0–0.9)
MONOCYTES NFR BLD AUTO: 10.6 % — SIGNIFICANT CHANGE UP (ref 2–14)
NEUTROPHILS # BLD AUTO: 3.68 K/UL — SIGNIFICANT CHANGE UP (ref 1.8–7.4)
NEUTROPHILS NFR BLD AUTO: 60.8 % — SIGNIFICANT CHANGE UP (ref 43–77)
NRBC # BLD: 0 /100 WBCS — SIGNIFICANT CHANGE UP (ref 0–0)
PHOSPHATE SERPL-MCNC: 4.3 MG/DL — SIGNIFICANT CHANGE UP (ref 2.5–4.5)
PLATELET # BLD AUTO: 377 K/UL — SIGNIFICANT CHANGE UP (ref 150–400)
POTASSIUM SERPL-MCNC: 4.5 MMOL/L — SIGNIFICANT CHANGE UP (ref 3.5–5.3)
POTASSIUM SERPL-SCNC: 4.5 MMOL/L — SIGNIFICANT CHANGE UP (ref 3.5–5.3)
RBC # BLD: 3.06 M/UL — LOW (ref 3.8–5.2)
RBC # FLD: 17.2 % — HIGH (ref 10.3–14.5)
SODIUM SERPL-SCNC: 137 MMOL/L — SIGNIFICANT CHANGE UP (ref 135–145)
WBC # BLD: 6.05 K/UL — SIGNIFICANT CHANGE UP (ref 3.8–10.5)
WBC # FLD AUTO: 6.05 K/UL — SIGNIFICANT CHANGE UP (ref 3.8–10.5)

## 2019-03-09 PROCEDURE — 99232 SBSQ HOSP IP/OBS MODERATE 35: CPT

## 2019-03-09 RX ORDER — ELECTROLYTE SOLUTION,INJ
1 VIAL (ML) INTRAVENOUS
Qty: 0 | Refills: 0 | Status: DISCONTINUED | OUTPATIENT
Start: 2019-03-09 | End: 2019-03-09

## 2019-03-09 RX ADMIN — ZINC OXIDE 1 APPLICATION(S): 200 OINTMENT TOPICAL at 06:39

## 2019-03-09 RX ADMIN — PANTOPRAZOLE SODIUM 40 MILLIGRAM(S): 20 TABLET, DELAYED RELEASE ORAL at 06:37

## 2019-03-09 RX ADMIN — ENOXAPARIN SODIUM 40 MILLIGRAM(S): 100 INJECTION SUBCUTANEOUS at 12:54

## 2019-03-09 RX ADMIN — Medication 1 EACH: at 17:27

## 2019-03-09 RX ADMIN — NYSTATIN CREAM 1 APPLICATION(S): 100000 CREAM TOPICAL at 17:27

## 2019-03-09 RX ADMIN — Medication 2: at 00:46

## 2019-03-09 RX ADMIN — CHLORHEXIDINE GLUCONATE 1 APPLICATION(S): 213 SOLUTION TOPICAL at 06:38

## 2019-03-09 RX ADMIN — NYSTATIN CREAM 1 APPLICATION(S): 100000 CREAM TOPICAL at 06:38

## 2019-03-09 RX ADMIN — ZINC OXIDE 1 APPLICATION(S): 200 OINTMENT TOPICAL at 13:01

## 2019-03-09 RX ADMIN — Medication 2: at 23:02

## 2019-03-09 RX ADMIN — ZINC OXIDE 1 APPLICATION(S): 200 OINTMENT TOPICAL at 23:03

## 2019-03-09 RX ADMIN — SODIUM CHLORIDE 100 MILLILITER(S): 9 INJECTION, SOLUTION INTRAVENOUS at 17:27

## 2019-03-09 RX ADMIN — Medication 2: at 06:37

## 2019-03-09 NOTE — PROGRESS NOTE ADULT - ASSESSMENT
58yo G0 w/ known stage IV endometrial adenocarcinoma s/p staging surgery '18 and 1 round of chemotherapy 2/19 admitted for management of symptomatic rectovaginal and enterovaginal fistulas, now with complex fistula associated bacteruria, hemodynamically stable and afebrile    1. ID: urine culture from 2/27 positive for ESBL E. coli, associated due to complex fistula, s/p treatement with bactrim DS BID on 3/1-3/8, repeat urine cx 3/6 w/ MRSA and ESBL proteus resistant to TMP-SMX   -ID consulted, recommended discontinuing antibiotics following fever curve and WBC, removing joseph and possible evaluation for vesicoenteric fistula, states no antibiotics needed in asymptomatic bacteriuria patients that are chronically catheterized  2. FEN/GI - NPO, IVH, replete electrolytes PRN, octreotide daily on TPN, s/p PICC placement 2/27, GI following recs appreciated, electrolytes repleted via TPN  3. PAIN - well controlled, tylenol, oxycodone, motrin PRN  4.  - s/p joseph, using primafit catheter which patient is doing well with; will use peripads when ambulating  -intermittent periods of leakage of scant amount of stool per vagina, general surgery consulted for recommendations on management of rectovaginal/enterovaginal fistulas, no surgical intervention at this time  -continue with nystatin powder and ointment for mons and labial irritation as well as zinc oxide  5. Endocrine- ISS while patient remains NPO, holding home metformin at this time  6. RESP-  Saturating well on room air, encouraged ISS  8. VTE prophylaxis - SCDs, ambulate as tolerated with PT, Lovenox 40qd, PT    9. PT recommending LITO, pt initially did not want to go, now amenable  - PT consulted to walk patient, as patient needs to be ambulating daily and daily exercises   10. Palliative: palliative consulted, appreciate recommendations  - radiation oncology consulted, recommended biopsy to confirm recurrence before considering radiation therapy  - continue with Lexapro 10mg qhs for depressed mood

## 2019-03-09 NOTE — PROGRESS NOTE ADULT - SUBJECTIVE AND OBJECTIVE BOX
GYN Progress Note    Patient seen at bedside this morning. Resting comfortable in bed. Denies any pain or vaginal irritation. States that she urinated multiple times overnight and was unable to make it to the bathroom. States she was able to use the bedpan a few times succesfully, but also could not hold her urine to get bedpan in place in time. States that she feels like she can not control her urination. Frustrated that she has to keep getting the bed changed due to urinating in the bed. Denies any vaginal pain or itching. Denies any leaking of stool. Ambulated yesterday and moved to chair multiple times, states she feels a little stronger.   Denies CP, palpitations, SOB, fever, chills, nausea, vomiting.    Vital Signs Last 24 Hrs  T(C): 36.6 (09 Mar 2019 05:27), Max: 36.8 (08 Mar 2019 13:35)  T(F): 97.9 (09 Mar 2019 05:27), Max: 98.2 (08 Mar 2019 13:35)  HR: 82 (09 Mar 2019 05:27) (77 - 82)  BP: 126/76 (09 Mar 2019 05:27) (112/77 - 146/99)  BP(mean): --  RR: 17 (09 Mar 2019 05:27) (17 - 18)  SpO2: 97% (09 Mar 2019 05:27) (97% - 98%)    Physical Exam:  Gen: No Acute Distress, resting comfortable in bed  Pulm: Normal work of breathing, no wheezes/rhonchi/rales   GI: soft, non tender, nondistended, +BS, no rebound, no guarding, LLQ ostomy w/ no output  : zinc oxide ointment on mons and inguinal region, minimal erythema noted, no stool leakage   Ext: SCDs in place,  no edema/erythema/tenderness    I&O's Summary    07 Mar 2019 07:01  -  08 Mar 2019 07:00  --------------------------------------------------------  IN: 260 mL / OUT: 4350 mL / NET: -4090 mL    08 Mar 2019 07:01  -  09 Mar 2019 06:20  --------------------------------------------------------  IN: 2013 mL / OUT: 1750 mL / NET: 263 mL      MEDICATIONS  (STANDING):  chlorhexidine 2% Cloths 1 Application(s) Topical <User Schedule>  dextrose 5%. 1000 milliLiter(s) (50 mL/Hr) IV Continuous <Continuous>  dextrose 50% Injectable 12.5 Gram(s) IV Push once  dextrose 50% Injectable 25 Gram(s) IV Push once  dextrose 50% Injectable 25 Gram(s) IV Push once  enoxaparin Injectable 40 milliGRAM(s) SubCutaneous daily  escitalopram 10 milliGRAM(s) Oral at bedtime  insulin lispro (HumaLOG) corrective regimen sliding scale   SubCutaneous every 6 hours  lactated ringers. 1000 milliLiter(s) (100 mL/Hr) IV Continuous <Continuous>  nystatin Powder 1 Application(s) Topical two times a day  pantoprazole  Injectable 40 milliGRAM(s) IV Push every 24 hours  Parenteral Nutrition - Adult 1 Each (83 mL/Hr) TPN Continuous <Continuous>  zinc oxide 20% Ointment 1 Application(s) Topical three times a day    MEDICATIONS  (PRN):  acetaminophen   Tablet .. 650 milliGRAM(s) Oral every 6 hours PRN Temp greater or equal to 38C (100.4F), Mild Pain (1 - 3)  dextrose 40% Gel 15 Gram(s) Oral once PRN Blood Glucose LESS THAN 70 milliGRAM(s)/deciliter  glucagon  Injectable 1 milliGRAM(s) IntraMuscular once PRN Glucose LESS THAN 70 milligrams/deciliter  ibuprofen  Tablet. 600 milliGRAM(s) Oral every 6 hours PRN mild pain (1-3) not relieved with Acetaminophen  sodium chloride 0.9% lock flush 10 milliLiter(s) IV Push every 1 hour PRN Pre/post blood products, medications, blood draw, and to maintain line patency      LABS:                        8.7    7.15  )-----------( 413      ( 08 Mar 2019 09:48 )             28.3     03-08    135  |  97  |  24<H>  ----------------------------<  102<H>  4.8   |  28  |  0.63    Ca    9.9      08 Mar 2019 09:47  Phos  4.2     03-08  Mg     1.8     03-08    TPro  7.2  /  Alb  3.4  /  TBili  0.2  /  DBili  x   /  AST  16  /  ALT  13  /  AlkPhos  104  03-08

## 2019-03-09 NOTE — PROGRESS NOTE ADULT - ASSESSMENT
60yo G0 w/ known stage IV endometrial adenocarcinoma s/p staging surgery '18 and 1 round of chemotherapy 2/19 admitted for management of symptomatic rectovaginal and enterovaginal fistulas, now with complex fistula associated bacteruria, hemodynamically stable and afebrile    1. ID: urine culture from 2/27 positive for ESBL E. coli, associated due to complex fistula, s/p treatement with bactrim DS BID on 3/1-3/8, repeat urine cx 3/6 w/ MRSA and ESBL proteus resistant to TMP-SMX   -ID consulted, recommended discontinuing antibiotics following fever curve and WBC, removing joseph and possible evaluation for vesicoenteric fistula-p, states no antibiotics needed in asymptomatic bacteriuria patients that are chronically catheterized  2. FEN/GI - NPO, IVH, replete electrolytes PRN, octreotide daily on TPN, s/p PICC placement 2/27, GI following recs appreciated, electrolytes repleted via TPN  3. PAIN - well controlled, tylenol, oxycodone, motrin PRN  4.  - s/p joseph, urinating but unable to control urine consistent with likely overactive bladder, educated on bed pan  -intermittent periods of leakage of scant amount of stool per vagina, general surgery consulted for recommendations on management of rectovaginal/enterovaginal fistulas, no surgical intervention at this time  -continue with nystatin powder and ointment for mons and labial irritation as well as zinc oxide  5. Endocrine- ISS while patient remains NPO, holding home metformin at this time  6. RESP-  Saturating well on room air, encouraged ISS  8. VTE prophylaxis - SCDs, ambulate as tolerated with PT, Lovenox 40qd, PT    9. PT recommending LITO, pt initially did not want to go, now amenable  - PT consulted to walk patient, as patient needs to be ambulating daily and daily exercises   10. Palliative: palliative consulted, appreciate recommendations  - radiation oncology consulted, recommended biopsy to confirm recurrence before considering radiation therapy  - continue with Lexapro 10mg qhs for depressed mood

## 2019-03-09 NOTE — PROGRESS NOTE ADULT - SUBJECTIVE AND OBJECTIVE BOX
GYN Progress Note    Patient seen at bedside.  Pain controlled.  Still NPO receiving TPN.  OOB once yesterday. Not yet today.   She is incontinent but voiding into primafit external catheter which she is happier with now.     Denies CP, palpitations, SOB, fever, chills, nausea, vomiting.    Vital Signs Last 24 Hrs  T(C): 36.4 (09 Mar 2019 09:29), Max: 36.8 (08 Mar 2019 13:35)  T(F): 97.6 (09 Mar 2019 09:29), Max: 98.2 (08 Mar 2019 13:35)  HR: 76 (09 Mar 2019 09:29) (76 - 82)  BP: 121/82 (09 Mar 2019 09:29) (112/77 - 146/99)  BP(mean): --  RR: 18 (09 Mar 2019 09:29) (17 - 18)  SpO2: 99% (09 Mar 2019 09:29) (97% - 99%)    Physical Exam:  Gen: No Acute Distress  Pulm: Normal work of breathing  GI: soft, nontender, nondistended, no BS, no rebound, no guarding. No ostomy output  Ext: SCDs in place, wnl    I&O's Summary    08 Mar 2019 07:01  -  09 Mar 2019 07:00  --------------------------------------------------------  IN: 4417 mL / OUT: 1750 mL / NET: 2667 mL      MEDICATIONS  (STANDING):  chlorhexidine 2% Cloths 1 Application(s) Topical <User Schedule>  dextrose 5%. 1000 milliLiter(s) (50 mL/Hr) IV Continuous <Continuous>  dextrose 50% Injectable 12.5 Gram(s) IV Push once  dextrose 50% Injectable 25 Gram(s) IV Push once  dextrose 50% Injectable 25 Gram(s) IV Push once  enoxaparin Injectable 40 milliGRAM(s) SubCutaneous daily  escitalopram 10 milliGRAM(s) Oral at bedtime  insulin lispro (HumaLOG) corrective regimen sliding scale   SubCutaneous every 6 hours  lactated ringers. 1000 milliLiter(s) (100 mL/Hr) IV Continuous <Continuous>  nystatin Powder 1 Application(s) Topical two times a day  pantoprazole  Injectable 40 milliGRAM(s) IV Push every 24 hours  Parenteral Nutrition - Adult 1 Each (83 mL/Hr) TPN Continuous <Continuous>  zinc oxide 20% Ointment 1 Application(s) Topical three times a day    MEDICATIONS  (PRN):  acetaminophen   Tablet .. 650 milliGRAM(s) Oral every 6 hours PRN Temp greater or equal to 38C (100.4F), Mild Pain (1 - 3)  dextrose 40% Gel 15 Gram(s) Oral once PRN Blood Glucose LESS THAN 70 milliGRAM(s)/deciliter  glucagon  Injectable 1 milliGRAM(s) IntraMuscular once PRN Glucose LESS THAN 70 milligrams/deciliter  ibuprofen  Tablet. 600 milliGRAM(s) Oral every 6 hours PRN mild pain (1-3) not relieved with Acetaminophen  sodium chloride 0.9% lock flush 10 milliLiter(s) IV Push every 1 hour PRN Pre/post blood products, medications, blood draw, and to maintain line patency      LABS:                        8.9    6.05  )-----------( 377      ( 09 Mar 2019 11:26 )             29.2     03-09    137  |  99  |  23  ----------------------------<  138<H>  4.5   |  27  |  0.59    Ca    10.0      09 Mar 2019 11:25  Phos  4.3     03-09  Mg     1.8     03-09    TPro  7.2  /  Alb  3.4  /  TBili  0.2  /  DBili  x   /  AST  16  /  ALT  13  /  AlkPhos  104  03-08

## 2019-03-10 LAB
ANION GAP SERPL CALC-SCNC: 10 MMOL/L — SIGNIFICANT CHANGE UP (ref 5–17)
BASOPHILS # BLD AUTO: 0.02 K/UL — SIGNIFICANT CHANGE UP (ref 0–0.2)
BASOPHILS NFR BLD AUTO: 0.3 % — SIGNIFICANT CHANGE UP (ref 0–2)
BUN SERPL-MCNC: 23 MG/DL — SIGNIFICANT CHANGE UP (ref 7–23)
CALCIUM SERPL-MCNC: 9.9 MG/DL — SIGNIFICANT CHANGE UP (ref 8.4–10.5)
CHLORIDE SERPL-SCNC: 100 MMOL/L — SIGNIFICANT CHANGE UP (ref 96–108)
CO2 SERPL-SCNC: 28 MMOL/L — SIGNIFICANT CHANGE UP (ref 22–31)
CREAT SERPL-MCNC: 0.51 MG/DL — SIGNIFICANT CHANGE UP (ref 0.5–1.3)
EOSINOPHIL # BLD AUTO: 0.14 K/UL — SIGNIFICANT CHANGE UP (ref 0–0.5)
EOSINOPHIL NFR BLD AUTO: 2.2 % — SIGNIFICANT CHANGE UP (ref 0–6)
GLUCOSE BLDC GLUCOMTR-MCNC: 108 MG/DL — HIGH (ref 70–99)
GLUCOSE BLDC GLUCOMTR-MCNC: 148 MG/DL — HIGH (ref 70–99)
GLUCOSE BLDC GLUCOMTR-MCNC: 170 MG/DL — HIGH (ref 70–99)
GLUCOSE BLDC GLUCOMTR-MCNC: 176 MG/DL — HIGH (ref 70–99)
GLUCOSE SERPL-MCNC: 119 MG/DL — HIGH (ref 70–99)
HCT VFR BLD CALC: 28 % — LOW (ref 34.5–45)
HGB BLD-MCNC: 8.8 G/DL — LOW (ref 11.5–15.5)
IMM GRANULOCYTES NFR BLD AUTO: 0.3 % — SIGNIFICANT CHANGE UP (ref 0–1.5)
LYMPHOCYTES # BLD AUTO: 1.31 K/UL — SIGNIFICANT CHANGE UP (ref 1–3.3)
LYMPHOCYTES # BLD AUTO: 20.5 % — SIGNIFICANT CHANGE UP (ref 13–44)
MAGNESIUM SERPL-MCNC: 1.6 MG/DL — SIGNIFICANT CHANGE UP (ref 1.6–2.6)
MCHC RBC-ENTMCNC: 29.6 PG — SIGNIFICANT CHANGE UP (ref 27–34)
MCHC RBC-ENTMCNC: 31.4 GM/DL — LOW (ref 32–36)
MCV RBC AUTO: 94.3 FL — SIGNIFICANT CHANGE UP (ref 80–100)
MONOCYTES # BLD AUTO: 0.66 K/UL — SIGNIFICANT CHANGE UP (ref 0–0.9)
MONOCYTES NFR BLD AUTO: 10.3 % — SIGNIFICANT CHANGE UP (ref 2–14)
NEUTROPHILS # BLD AUTO: 4.24 K/UL — SIGNIFICANT CHANGE UP (ref 1.8–7.4)
NEUTROPHILS NFR BLD AUTO: 66.4 % — SIGNIFICANT CHANGE UP (ref 43–77)
NRBC # BLD: 0 /100 WBCS — SIGNIFICANT CHANGE UP (ref 0–0)
PHOSPHATE SERPL-MCNC: 4.6 MG/DL — HIGH (ref 2.5–4.5)
PLATELET # BLD AUTO: 321 K/UL — SIGNIFICANT CHANGE UP (ref 150–400)
POTASSIUM SERPL-MCNC: 4.2 MMOL/L — SIGNIFICANT CHANGE UP (ref 3.5–5.3)
POTASSIUM SERPL-SCNC: 4.2 MMOL/L — SIGNIFICANT CHANGE UP (ref 3.5–5.3)
RBC # BLD: 2.97 M/UL — LOW (ref 3.8–5.2)
RBC # FLD: 17 % — HIGH (ref 10.3–14.5)
SODIUM SERPL-SCNC: 138 MMOL/L — SIGNIFICANT CHANGE UP (ref 135–145)
TRIGL SERPL-MCNC: 199 MG/DL — HIGH (ref 10–149)
WBC # BLD: 6.39 K/UL — SIGNIFICANT CHANGE UP (ref 3.8–10.5)
WBC # FLD AUTO: 6.39 K/UL — SIGNIFICANT CHANGE UP (ref 3.8–10.5)

## 2019-03-10 RX ORDER — I.V. FAT EMULSION 20 G/100ML
0.81 EMULSION INTRAVENOUS
Qty: 50 | Refills: 0 | Status: DISCONTINUED | OUTPATIENT
Start: 2019-03-10 | End: 2019-03-10

## 2019-03-10 RX ORDER — ELECTROLYTE SOLUTION,INJ
1 VIAL (ML) INTRAVENOUS
Qty: 0 | Refills: 0 | Status: DISCONTINUED | OUTPATIENT
Start: 2019-03-10 | End: 2019-03-10

## 2019-03-10 RX ADMIN — CHLORHEXIDINE GLUCONATE 1 APPLICATION(S): 213 SOLUTION TOPICAL at 06:35

## 2019-03-10 RX ADMIN — ZINC OXIDE 1 APPLICATION(S): 200 OINTMENT TOPICAL at 12:19

## 2019-03-10 RX ADMIN — SODIUM CHLORIDE 100 MILLILITER(S): 9 INJECTION, SOLUTION INTRAVENOUS at 21:07

## 2019-03-10 RX ADMIN — ZINC OXIDE 1 APPLICATION(S): 200 OINTMENT TOPICAL at 06:35

## 2019-03-10 RX ADMIN — NYSTATIN CREAM 1 APPLICATION(S): 100000 CREAM TOPICAL at 21:01

## 2019-03-10 RX ADMIN — ENOXAPARIN SODIUM 40 MILLIGRAM(S): 100 INJECTION SUBCUTANEOUS at 12:19

## 2019-03-10 RX ADMIN — Medication 2: at 06:35

## 2019-03-10 RX ADMIN — I.V. FAT EMULSION 20.85 GM/KG/DAY: 20 EMULSION INTRAVENOUS at 21:00

## 2019-03-10 RX ADMIN — ZINC OXIDE 1 APPLICATION(S): 200 OINTMENT TOPICAL at 21:01

## 2019-03-10 RX ADMIN — PANTOPRAZOLE SODIUM 40 MILLIGRAM(S): 20 TABLET, DELAYED RELEASE ORAL at 06:35

## 2019-03-10 RX ADMIN — NYSTATIN CREAM 1 APPLICATION(S): 100000 CREAM TOPICAL at 06:35

## 2019-03-10 NOTE — PROGRESS NOTE ADULT - ASSESSMENT
60yo G0 w/ known stage IV endometrial adenocarcinoma s/p staging surgery '18 and 1 round of chemotherapy 2/19 admitted for management of symptomatic rectovaginal and enterovaginal fistulas, now with complex fistula associated bacteruria, hemodynamically stable and afebrile    1. ID: urine culture from 2/27 positive for ESBL E. coli, associated due to complex fistula, s/p treatement with bactrim DS BID on 3/1-3/8, repeat urine cx 3/6 w/ MRSA and ESBL proteus resistant to TMP-SMX   -ID consulted, recommended discontinuing antibiotics following fever curve and WBC, remove joseph and possible evaluation for vesicoenteric fistula, states no antibiotics needed in asymptomatic bacteriuria patients that are chronically catheterized    2. FEN/GI - NPO, IVH, replete electrolytes PRN, octreotide daily on TPN, s/p PICC placement 2/27, GI following recs appreciated, electrolytes repleted via TPN, lipids vis TPN today   3. PAIN - well controlled, tylenol, oxycodone, motrin PRN  4.  - s/p joseph, using primafit catheter which patient is doing well with; will use peripads when ambulating  -intermittent periods of leakage of scant amount of stool per vagina, general surgery consulted for recommendations on management of rectovaginal/enterovaginal fistulas, no surgical intervention at this time  -continue with nystatin powder and ointment for mons and labial irritation as well as zinc oxide  5. Endocrine- ISS while patient remains NPO, holding home metformin at this time  6. RESP-  Saturating well on room air, encouraged ISS  8. VTE prophylaxis - SCDs, ambulate as tolerated with PT, Lovenox 40qd, PT    9. PT recommending [facility], pt initially did not want to go, now amenable  - PT consulted to walk patient, as patient needs to be ambulating daily and daily exercises   10. Palliative: palliative consulted, appreciate recommendations  - radiation oncology consulted, recommended biopsy to confirm recurrence before considering radiation therapy  - possible chemotherapy on Tuesday  - continue with Lexapro 10mg qhs for depressed mood

## 2019-03-10 NOTE — PROGRESS NOTE ADULT - ASSESSMENT
58yo G0 w/ known stage IV endometrial adenocarcinoma s/p staging surgery '18 and 1 round of chemotherapy 2/19 admitted for management of symptomatic rectovaginal and enterovaginal fistulas, now with complex fistula associated bacteruria, hemodynamically stable and afebrile    1. ID: urine culture from 2/27 positive for ESBL E. coli, associated due to complex fistula, s/p treatement with bactrim DS BID on 3/1-3/8, repeat urine cx 3/6 w/ MRSA and ESBL proteus resistant to TMP-SMX   -ID consulted, recommended discontinuing antibiotics following fever curve and WBC, removing joseph and possible evaluation for vesicoenteric fistula, states no antibiotics needed in asymptomatic bacteriuria patients that are chronically catheterized  2. FEN/GI - NPO, IVH, replete electrolytes PRN, octreotide daily on TPN, s/p PICC placement 2/27, GI following recs appreciated, electrolytes repleted via TPN  3. PAIN - well controlled, tylenol, oxycodone, motrin PRN  4.  - s/p joseph, using primafit catheter which patient is doing well with; will use peripads when ambulating  -intermittent periods of leakage of scant amount of stool per vagina, general surgery consulted for recommendations on management of rectovaginal/enterovaginal fistulas, no surgical intervention at this time  -continue with nystatin powder and ointment for mons and labial irritation as well as zinc oxide  5. Endocrine- ISS while patient remains NPO, holding home metformin at this time  6. RESP-  Saturating well on room air, encouraged ISS  8. VTE prophylaxis - SCDs, ambulate as tolerated with PT, Lovenox 40qd, PT    9. PT recommending [facility], pt initially did not want to go, now amenable  - PT consulted to walk patient, as patient needs to be ambulating daily and daily exercises   10. Palliative: palliative consulted, appreciate recommendations  - radiation oncology consulted, recommended biopsy to confirm recurrence before considering radiation therapy  - possible chemotherapy on Tuesday  - continue with Lexapro 10mg qhs for depressed mood

## 2019-03-10 NOTE — PROGRESS NOTE ADULT - SUBJECTIVE AND OBJECTIVE BOX
GYN Progress Note    Patient seen at bedside. No acute events overnight.  Pain controlled.  NPO, receiving TPN.   Was OOB to chair, now currently in bed.   Voiding/incontinent via primafit     Denies CP, palpitations, SOB, fever, chills, nausea, vomiting.    Vital Signs Last 24 Hrs  T(C): 36.3 (10 Mar 2019 05:51), Max: 37 (09 Mar 2019 13:14)  T(F): 97.3 (10 Mar 2019 05:51), Max: 98.6 (09 Mar 2019 13:14)  HR: 87 (10 Mar 2019 05:51) (69 - 89)  BP: 132/83 (10 Mar 2019 05:51) (121/82 - 159/112)  BP(mean): --  RR: 17 (10 Mar 2019 05:51) (16 - 18)  SpO2: 99% (10 Mar 2019 05:51) (97% - 100%)    Physical Exam:  Gen: No Acute Distress  Pulm: Normal work of breathing  GI: soft, nontender, stable distension, no BS, no rebound, no guarding. No ostomy output  Ext: SCDs in place, wnl    I&O's Summary    08 Mar 2019 07:01  -  09 Mar 2019 07:00  --------------------------------------------------------  IN: 4417 mL / OUT: 1750 mL / NET: 2667 mL    09 Mar 2019 06:01  -  10 Mar 2019 06:48  --------------------------------------------------------  IN: 3398 mL / OUT: 1400 mL / NET: 1998 mL      MEDICATIONS  (STANDING):  chlorhexidine 2% Cloths 1 Application(s) Topical <User Schedule>  dextrose 5%. 1000 milliLiter(s) (50 mL/Hr) IV Continuous <Continuous>  dextrose 50% Injectable 12.5 Gram(s) IV Push once  dextrose 50% Injectable 25 Gram(s) IV Push once  dextrose 50% Injectable 25 Gram(s) IV Push once  enoxaparin Injectable 40 milliGRAM(s) SubCutaneous daily  escitalopram 10 milliGRAM(s) Oral at bedtime  insulin lispro (HumaLOG) corrective regimen sliding scale   SubCutaneous every 6 hours  lactated ringers. 1000 milliLiter(s) (100 mL/Hr) IV Continuous <Continuous>  nystatin Powder 1 Application(s) Topical two times a day  pantoprazole  Injectable 40 milliGRAM(s) IV Push every 24 hours  Parenteral Nutrition - Adult 1 Each (83 mL/Hr) TPN Continuous <Continuous>  zinc oxide 20% Ointment 1 Application(s) Topical three times a day    MEDICATIONS  (PRN):  acetaminophen   Tablet .. 650 milliGRAM(s) Oral every 6 hours PRN Temp greater or equal to 38C (100.4F), Mild Pain (1 - 3)  dextrose 40% Gel 15 Gram(s) Oral once PRN Blood Glucose LESS THAN 70 milliGRAM(s)/deciliter  glucagon  Injectable 1 milliGRAM(s) IntraMuscular once PRN Glucose LESS THAN 70 milligrams/deciliter  ibuprofen  Tablet. 600 milliGRAM(s) Oral every 6 hours PRN mild pain (1-3) not relieved with Acetaminophen  sodium chloride 0.9% lock flush 10 milliLiter(s) IV Push every 1 hour PRN Pre/post blood products, medications, blood draw, and to maintain line patency      LABS:                        8.9    6.05  )-----------( 377      ( 09 Mar 2019 11:26 )             29.2     03-09    137  |  99  |  23  ----------------------------<  138<H>  4.5   |  27  |  0.59    Ca    10.0      09 Mar 2019 11:25  Phos  4.3     03-09  Mg     1.8     03-09    TPro  7.2  /  Alb  3.4  /  TBili  0.2  /  DBili  x   /  AST  16  /  ALT  13  /  AlkPhos  104  03-08

## 2019-03-10 NOTE — PROGRESS NOTE ADULT - SUBJECTIVE AND OBJECTIVE BOX
GYN Progress Note    Patient seen this afternoon. Walked hallway together. Patient used walker for assistance. Continues to report urinary incontinence and prefers  primafit catheter.   Denies any pain or itching of genital region. No leaking of stool per vagina noted.  Denies CP, palpitations, SOB, fever, chills, nausea, vomiting.    Vital Signs Last 24 Hrs  T(C): 36.3 (10 Mar 2019 09:33), Max: 36.4 (09 Mar 2019 16:58)  T(F): 97.4 (10 Mar 2019 09:33), Max: 97.5 (09 Mar 2019 16:58)  HR: 85 (10 Mar 2019 09:33) (69 - 89)  BP: 148/84 (10 Mar 2019 09:33) (129/69 - 159/112)  BP(mean): --  RR: 18 (10 Mar 2019 09:33) (16 - 18)  SpO2: 98% (10 Mar 2019 09:33) (97% - 100%)    Physical Exam:  Gen: No Acute Distress, resting comfortable in bed  Pulm: Normal work of breathing, no wheezes/rhonchi/rales   GI: soft, non tender, nondistended, +BS, no rebound, no guarding,  LLQ ostomy with brown formed stool  : primafit catheter in place, no leaking of stool noted, minimal mons erythema   Ext: SCDs in place, wnl    I&O's Summary    09 Mar 2019 06:01  -  10 Mar 2019 07:00  --------------------------------------------------------  IN: 3398 mL / OUT: 1400 mL / NET: 1998 mL      MEDICATIONS  (STANDING):  chlorhexidine 2% Cloths 1 Application(s) Topical <User Schedule>  dextrose 5%. 1000 milliLiter(s) (50 mL/Hr) IV Continuous <Continuous>  dextrose 50% Injectable 12.5 Gram(s) IV Push once  dextrose 50% Injectable 25 Gram(s) IV Push once  dextrose 50% Injectable 25 Gram(s) IV Push once  enoxaparin Injectable 40 milliGRAM(s) SubCutaneous daily  escitalopram 10 milliGRAM(s) Oral at bedtime  fat emulsion (Plant Based) 20% Infusion 0.811 Gm/kG/Day (20.849 mL/Hr) IV Continuous <Continuous>  insulin lispro (HumaLOG) corrective regimen sliding scale   SubCutaneous every 6 hours  lactated ringers. 1000 milliLiter(s) (100 mL/Hr) IV Continuous <Continuous>  nystatin Powder 1 Application(s) Topical two times a day  pantoprazole  Injectable 40 milliGRAM(s) IV Push every 24 hours  Parenteral Nutrition - Adult 1 Each (83 mL/Hr) TPN Continuous <Continuous>  Parenteral Nutrition - Adult 1 Each (83 mL/Hr) TPN Continuous <Continuous>  zinc oxide 20% Ointment 1 Application(s) Topical three times a day    MEDICATIONS  (PRN):  acetaminophen   Tablet .. 650 milliGRAM(s) Oral every 6 hours PRN Temp greater or equal to 38C (100.4F), Mild Pain (1 - 3)  dextrose 40% Gel 15 Gram(s) Oral once PRN Blood Glucose LESS THAN 70 milliGRAM(s)/deciliter  glucagon  Injectable 1 milliGRAM(s) IntraMuscular once PRN Glucose LESS THAN 70 milligrams/deciliter  ibuprofen  Tablet. 600 milliGRAM(s) Oral every 6 hours PRN mild pain (1-3) not relieved with Acetaminophen  sodium chloride 0.9% lock flush 10 milliLiter(s) IV Push every 1 hour PRN Pre/post blood products, medications, blood draw, and to maintain line patency      LABS:                        8.8    6.39  )-----------( 321      ( 10 Mar 2019 10:41 )             28.0     03-10    138  |  100  |  23  ----------------------------<  119<H>  4.2   |  28  |  0.51    Ca    9.9      10 Mar 2019 10:41  Phos  4.6     03-10  Mg     1.6     03-10

## 2019-03-11 LAB
ALBUMIN SERPL ELPH-MCNC: 3.2 G/DL — LOW (ref 3.3–5)
ALP SERPL-CCNC: 92 U/L — SIGNIFICANT CHANGE UP (ref 40–120)
ALT FLD-CCNC: 13 U/L — SIGNIFICANT CHANGE UP (ref 10–45)
ANION GAP SERPL CALC-SCNC: 8 MMOL/L — SIGNIFICANT CHANGE UP (ref 5–17)
APTT BLD: 30.4 SEC — SIGNIFICANT CHANGE UP (ref 27.5–36.3)
AST SERPL-CCNC: 15 U/L — SIGNIFICANT CHANGE UP (ref 10–40)
BASOPHILS # BLD AUTO: 0.02 K/UL — SIGNIFICANT CHANGE UP (ref 0–0.2)
BASOPHILS NFR BLD AUTO: 0.2 % — SIGNIFICANT CHANGE UP (ref 0–2)
BILIRUB SERPL-MCNC: 0.2 MG/DL — SIGNIFICANT CHANGE UP (ref 0.2–1.2)
BUN SERPL-MCNC: 24 MG/DL — HIGH (ref 7–23)
CALCIUM SERPL-MCNC: 9.6 MG/DL — SIGNIFICANT CHANGE UP (ref 8.4–10.5)
CHLORIDE SERPL-SCNC: 99 MMOL/L — SIGNIFICANT CHANGE UP (ref 96–108)
CO2 SERPL-SCNC: 27 MMOL/L — SIGNIFICANT CHANGE UP (ref 22–31)
CREAT SERPL-MCNC: 0.52 MG/DL — SIGNIFICANT CHANGE UP (ref 0.5–1.3)
EOSINOPHIL # BLD AUTO: 0.13 K/UL — SIGNIFICANT CHANGE UP (ref 0–0.5)
EOSINOPHIL NFR BLD AUTO: 1.6 % — SIGNIFICANT CHANGE UP (ref 0–6)
GLUCOSE BLDC GLUCOMTR-MCNC: 123 MG/DL — HIGH (ref 70–99)
GLUCOSE BLDC GLUCOMTR-MCNC: 169 MG/DL — HIGH (ref 70–99)
GLUCOSE BLDC GLUCOMTR-MCNC: 173 MG/DL — HIGH (ref 70–99)
GLUCOSE BLDC GLUCOMTR-MCNC: 180 MG/DL — HIGH (ref 70–99)
GLUCOSE SERPL-MCNC: 135 MG/DL — HIGH (ref 70–99)
HCT VFR BLD CALC: 25.5 % — LOW (ref 34.5–45)
HGB BLD-MCNC: 8 G/DL — LOW (ref 11.5–15.5)
IMM GRANULOCYTES NFR BLD AUTO: 0.5 % — SIGNIFICANT CHANGE UP (ref 0–1.5)
INR BLD: 1.06 — SIGNIFICANT CHANGE UP (ref 0.88–1.16)
LYMPHOCYTES # BLD AUTO: 1.37 K/UL — SIGNIFICANT CHANGE UP (ref 1–3.3)
LYMPHOCYTES # BLD AUTO: 16.7 % — SIGNIFICANT CHANGE UP (ref 13–44)
MAGNESIUM SERPL-MCNC: 1.6 MG/DL — SIGNIFICANT CHANGE UP (ref 1.6–2.6)
MCHC RBC-ENTMCNC: 29.6 PG — SIGNIFICANT CHANGE UP (ref 27–34)
MCHC RBC-ENTMCNC: 31.4 GM/DL — LOW (ref 32–36)
MCV RBC AUTO: 94.4 FL — SIGNIFICANT CHANGE UP (ref 80–100)
MONOCYTES # BLD AUTO: 0.71 K/UL — SIGNIFICANT CHANGE UP (ref 0–0.9)
MONOCYTES NFR BLD AUTO: 8.7 % — SIGNIFICANT CHANGE UP (ref 2–14)
NEUTROPHILS # BLD AUTO: 5.93 K/UL — SIGNIFICANT CHANGE UP (ref 1.8–7.4)
NEUTROPHILS NFR BLD AUTO: 72.3 % — SIGNIFICANT CHANGE UP (ref 43–77)
NRBC # BLD: 0 /100 WBCS — SIGNIFICANT CHANGE UP (ref 0–0)
PHOSPHATE SERPL-MCNC: 4.1 MG/DL — SIGNIFICANT CHANGE UP (ref 2.5–4.5)
PLATELET # BLD AUTO: 264 K/UL — SIGNIFICANT CHANGE UP (ref 150–400)
POTASSIUM SERPL-MCNC: 4.2 MMOL/L — SIGNIFICANT CHANGE UP (ref 3.5–5.3)
POTASSIUM SERPL-SCNC: 4.2 MMOL/L — SIGNIFICANT CHANGE UP (ref 3.5–5.3)
PROT SERPL-MCNC: 6.6 G/DL — SIGNIFICANT CHANGE UP (ref 6–8.3)
PROTHROM AB SERPL-ACNC: 12 SEC — SIGNIFICANT CHANGE UP (ref 10–12.9)
RBC # BLD: 2.7 M/UL — LOW (ref 3.8–5.2)
RBC # FLD: 16.9 % — HIGH (ref 10.3–14.5)
SODIUM SERPL-SCNC: 134 MMOL/L — LOW (ref 135–145)
WBC # BLD: 8.2 K/UL — SIGNIFICANT CHANGE UP (ref 3.8–10.5)
WBC # FLD AUTO: 8.2 K/UL — SIGNIFICANT CHANGE UP (ref 3.8–10.5)

## 2019-03-11 PROCEDURE — 99232 SBSQ HOSP IP/OBS MODERATE 35: CPT

## 2019-03-11 RX ORDER — FAMOTIDINE 10 MG/ML
20 INJECTION INTRAVENOUS
Qty: 0 | Refills: 0 | Status: DISCONTINUED | OUTPATIENT
Start: 2019-03-11 | End: 2019-03-25

## 2019-03-11 RX ORDER — KETOROLAC TROMETHAMINE 30 MG/ML
15 SYRINGE (ML) INJECTION ONCE
Qty: 0 | Refills: 0 | Status: DISCONTINUED | OUTPATIENT
Start: 2019-03-11 | End: 2019-03-11

## 2019-03-11 RX ORDER — ELECTROLYTE SOLUTION,INJ
1 VIAL (ML) INTRAVENOUS
Qty: 0 | Refills: 0 | Status: DISCONTINUED | OUTPATIENT
Start: 2019-03-11 | End: 2019-03-11

## 2019-03-11 RX ORDER — IBUPROFEN 200 MG
600 TABLET ORAL EVERY 6 HOURS
Qty: 0 | Refills: 0 | Status: DISCONTINUED | OUTPATIENT
Start: 2019-03-11 | End: 2019-03-12

## 2019-03-11 RX ADMIN — Medication 1 EACH: at 18:40

## 2019-03-11 RX ADMIN — NYSTATIN CREAM 1 APPLICATION(S): 100000 CREAM TOPICAL at 05:38

## 2019-03-11 RX ADMIN — SODIUM CHLORIDE 100 MILLILITER(S): 9 INJECTION, SOLUTION INTRAVENOUS at 07:33

## 2019-03-11 RX ADMIN — Medication 2: at 06:56

## 2019-03-11 RX ADMIN — Medication 650 MILLIGRAM(S): at 12:35

## 2019-03-11 RX ADMIN — CHLORHEXIDINE GLUCONATE 1 APPLICATION(S): 213 SOLUTION TOPICAL at 05:37

## 2019-03-11 RX ADMIN — Medication 15 MILLIGRAM(S): at 18:02

## 2019-03-11 RX ADMIN — Medication 2: at 12:52

## 2019-03-11 RX ADMIN — ENOXAPARIN SODIUM 40 MILLIGRAM(S): 100 INJECTION SUBCUTANEOUS at 12:15

## 2019-03-11 RX ADMIN — ZINC OXIDE 1 APPLICATION(S): 200 OINTMENT TOPICAL at 22:27

## 2019-03-11 RX ADMIN — NYSTATIN CREAM 1 APPLICATION(S): 100000 CREAM TOPICAL at 18:39

## 2019-03-11 RX ADMIN — FAMOTIDINE 104 MILLIGRAM(S): 10 INJECTION INTRAVENOUS at 23:58

## 2019-03-11 RX ADMIN — Medication 15 MILLIGRAM(S): at 17:34

## 2019-03-11 RX ADMIN — PANTOPRAZOLE SODIUM 40 MILLIGRAM(S): 20 TABLET, DELAYED RELEASE ORAL at 06:56

## 2019-03-11 RX ADMIN — Medication 2: at 22:26

## 2019-03-11 RX ADMIN — Medication 650 MILLIGRAM(S): at 12:15

## 2019-03-11 RX ADMIN — ZINC OXIDE 1 APPLICATION(S): 200 OINTMENT TOPICAL at 05:38

## 2019-03-11 NOTE — PROGRESS NOTE ADULT - SUBJECTIVE AND OBJECTIVE BOX
GYN PROGRESS NOTE PGY4    Patient evaluated at the bedside. No acute events. No complaints. Primafit and draining urine. Ostomy with output of stool and gas. TPN on. SCDs on. Denies CP/SOB/dizziness/nausea/vomiting/abdominal pain/calf pain. Had abdominal pain yesterday which resolved. Ambulated yesterday.     T(C): 37.2 (03-11-19 @ 05:52), Max: 37.2 (03-11-19 @ 05:52)  HR: 88 (03-11-19 @ 05:52) (80 - 99)  BP: 127/72 (03-11-19 @ 05:52) (127/72 - 152/110)  RR: 16 (03-11-19 @ 05:52) (16 - 18)  SpO2: 97% (03-11-19 @ 05:52) (97% - 99%)    GEN: patient appears well  LUNGS: no respiratory distress  ABD: soft, non tender, non distended, ostomy  EXT: no calf tenderness        03-09 @ 06:01  -  03-10 @ 07:00  --------------------------------------------------------  IN: 3398 mL / OUT: 1400 mL / NET: 1998 mL    03-10 @ 07:01  -  03-11 @ 06:26  --------------------------------------------------------  IN: 2041.3 mL / OUT: 1050 mL / NET: 991.3 mL        MEDICATIONS  (STANDING):  chlorhexidine 2% Cloths 1 Application(s) Topical <User Schedule>  dextrose 5%. 1000 milliLiter(s) (50 mL/Hr) IV Continuous <Continuous>  dextrose 50% Injectable 12.5 Gram(s) IV Push once  dextrose 50% Injectable 25 Gram(s) IV Push once  dextrose 50% Injectable 25 Gram(s) IV Push once  enoxaparin Injectable 40 milliGRAM(s) SubCutaneous daily  escitalopram 10 milliGRAM(s) Oral at bedtime  fat emulsion (Plant Based) 20% Infusion 0.811 Gm/kG/Day (20.849 mL/Hr) IV Continuous <Continuous>  insulin lispro (HumaLOG) corrective regimen sliding scale   SubCutaneous every 6 hours  lactated ringers. 1000 milliLiter(s) (100 mL/Hr) IV Continuous <Continuous>  nystatin Powder 1 Application(s) Topical two times a day  pantoprazole  Injectable 40 milliGRAM(s) IV Push every 24 hours  Parenteral Nutrition - Adult 1 Each (83 mL/Hr) TPN Continuous <Continuous>  zinc oxide 20% Ointment 1 Application(s) Topical three times a day    MEDICATIONS  (PRN):  acetaminophen   Tablet .. 650 milliGRAM(s) Oral every 6 hours PRN Temp greater or equal to 38C (100.4F), Mild Pain (1 - 3)  dextrose 40% Gel 15 Gram(s) Oral once PRN Blood Glucose LESS THAN 70 milliGRAM(s)/deciliter  glucagon  Injectable 1 milliGRAM(s) IntraMuscular once PRN Glucose LESS THAN 70 milligrams/deciliter  ibuprofen  Tablet. 600 milliGRAM(s) Oral every 6 hours PRN mild pain (1-3) not relieved with Acetaminophen  sodium chloride 0.9% lock flush 10 milliLiter(s) IV Push every 1 hour PRN Pre/post blood products, medications, blood draw, and to maintain line patency

## 2019-03-11 NOTE — PROGRESS NOTE ADULT - ASSESSMENT
60yo G0 w/ known stage IV endometrial adenocarcinoma s/p staging surgery '18 and 1 round of chemotherapy 2/19 admitted for management of symptomatic rectovaginal and enterovaginal fistulas, now with complex fistula, likely resolving, associated bacteruria, hemodynamically stable and afebrile.     1. ID: urine culture from 2/27 positive for ESBL E. coli, associated due to complex fistula, s/p treatement with bactrim DS BID on 3/1-3/8, repeat urine cx 3/6 w/ MRSA and ESBL proteus resistant to TMP-SMX   -ID consulted, recommended discontinuing antibiotics following fever curve and WBC, remove joseph and possible evaluation for vesicoenteric fistula, states no antibiotics needed in asymptomatic bacteriuria patients that are chronically catheterized  (all complete)  2. FEN/GI - NPO, IVH, replete electrolytes PRN, octreotide daily on TPN, s/p PICC placement 2/27, GI following recs appreciated, electrolytes repleted via TPN, lipids vis TPN today   3. PAIN - well controlled, tylenol as needed  4.  - s/p joseph, using primafit catheter which patient is doing well with; will use peripads when ambulating  -intermittent periods of leakage of scant amount of stool per vagina, general surgery consulted for recommendations on management of rectovaginal/enterovaginal fistulas, no surgical intervention at this time  -continue with nystatin powder and ointment for mons and labial irritation as well as zinc oxide  5. Endocrine- ISS while patient remains NPO, holding home metformin at this time  6. RESP-  Saturating well on room air, encouraged ISS  8. VTE prophylaxis - SCDs, ambulate as tolerated with PT, Lovenox 40qd, PT    9. PT recommending [facility], pt initially did not want to go, now amenable  - PT consulted to walk patient, as patient needs to be ambulating daily and daily exercises   10. Palliative: palliative consulted, appreciate recommendations  - radiation oncology consulted, recommended biopsy to confirm recurrence before considering radiation therapy  - possible chemotherapy on Tuesday  - continue with Lexapro 10mg qhs for depressed mood

## 2019-03-11 NOTE — PROGRESS NOTE ADULT - ASSESSMENT
60yo G0 w/ known stage IV endometrial adenocarcinoma s/p staging surgery '18 and 1 round of chemotherapy 2/19 admitted for management of symptomatic rectovaginal and enterovaginal fistulas, now with complex fistula associated bacteruria, hemodynamically stable and afebrile. Working on dispo plan.    1. ID: urine culture from 2/27 positive for ESBL E. coli, associated due to complex fistula, s/p treatement with bactrim DS BID on 3/1-3/8, repeat urine cx 3/6 w/ MRSA and ESBL proteus resistant to TMP-SMX   -ID consulted, recommended discontinuing antibiotics following fever curve and WBC, remove joseph and possible evaluation for vesicoenteric fistula, states no antibiotics needed in asymptomatic bacteriuria patients that are chronically catheterized  2. FEN/GI - NPO, IVH, replete electrolytes PRN, octreotide daily on TPN, PICC placed 2/27, electrolytes repleted via TPN, lipids qod  3. PAIN - overall well controlled, tylenol, motrin PRN (giving toradol x 1)  4.  - s/p joseph, using primafit catheter which patient is doing well with; will use peripads when ambulating  -intermittent periods of leakage of scant amount of stool per vagina decreasing in frequency, general surgery consulted for recommendations on management of rectovaginal/enterovaginal fistulas, no surgical intervention at this time  -continue with nystatin powder and ointment for mons and labial irritation as well as zinc oxide  5. Endocrine- ISS while patient remains NPO, holding home metformin at this time  6. RESP-  Saturating well on room air, encouraged ISS  8. VTE prophylaxis - SCDs, ambulate as tolerated with PT, Lovenox 40qd, PT    9. PT recommending [facility], pt can't go on TPN, case mgmt looking into options for [facility] that accepts TPN  - PT consulted to walk patient, as patient needs to be ambulating daily and daily exercises   - looking into inpatient private PT  10. Palliative: palliative consulted, appreciate recommendations  - radiation oncology consulted, recommended biopsy to confirm recurrence before considering radiation therapy, however no longer considering RT given relative contraindications w/ fistula and better options available (?chemo)  - possible chemotherapy when functional status improves  - pt no longer desires Lexapro 10mg qhs

## 2019-03-11 NOTE — CHART NOTE - NSCHARTNOTEFT_GEN_A_CORE
Admitting Diagnosis:   Patient is a 59y old  Female who presents with a chief complaint of Rectovaginal and enterovaginal fistulas (06 Mar 2019 11:51)      PAST MEDICAL & SURGICAL HISTORY:      Current Nutrition Order: NPO  TPN via PICC:  230g Dex, 79g AA, 50g 20% lipids 4x/week (1598kcal, 1.65g pro/kg IBW, GIR 2.5g)    PO Intake: Good (%) [   ]  Fair (50-75%) [   ] Poor (<25%) [   ] N/A    GI Issues:   LLQ colostomy  stool and gas in ostomy  Denies N/V     Pain:  No pain reported     Skin Integrity:  IAD at perirenal area  Colostomy in place    Labs:       134<L>  |  99  |  24<H>  ----------------------------<  135<H>  4.2   |  27  |  0.52    Ca    9.6      11 Mar 2019 08:04  Phos  4.1       Mg     1.6         TPro  6.6  /  Alb  3.2<L>  /  TBili  0.2  /  DBili  x   /  AST  15  /  ALT  13  /  AlkPhos  92      CAPILLARY BLOOD GLUCOSE      POCT Blood Glucose.: 180 mg/dL (11 Mar 2019 12:36)  POCT Blood Glucose.: 173 mg/dL (11 Mar 2019 06:48)  POCT Blood Glucose.: 170 mg/dL (10 Mar 2019 21:15)  POCT Blood Glucose.: 148 mg/dL (10 Mar 2019 17:16)      Medications:  MEDICATIONS  (STANDING):  chlorhexidine 2% Cloths 1 Application(s) Topical <User Schedule>  dextrose 5%. 1000 milliLiter(s) (50 mL/Hr) IV Continuous <Continuous>  dextrose 50% Injectable 12.5 Gram(s) IV Push once  dextrose 50% Injectable 25 Gram(s) IV Push once  dextrose 50% Injectable 25 Gram(s) IV Push once  enoxaparin Injectable 40 milliGRAM(s) SubCutaneous daily  escitalopram 10 milliGRAM(s) Oral at bedtime  insulin lispro (HumaLOG) corrective regimen sliding scale   SubCutaneous every 6 hours  lactated ringers. 1000 milliLiter(s) (100 mL/Hr) IV Continuous <Continuous>  nystatin Powder 1 Application(s) Topical two times a day  pantoprazole  Injectable 40 milliGRAM(s) IV Push every 24 hours  Parenteral Nutrition - Adult 1 Each (83 mL/Hr) TPN Continuous <Continuous>  Parenteral Nutrition - Adult 1 Each (83 mL/Hr) TPN Continuous <Continuous>  zinc oxide 20% Ointment 1 Application(s) Topical three times a day    MEDICATIONS  (PRN):  acetaminophen   Tablet .. 650 milliGRAM(s) Oral every 6 hours PRN Temp greater or equal to 38C (100.4F), Mild Pain (1 - 3)  dextrose 40% Gel 15 Gram(s) Oral once PRN Blood Glucose LESS THAN 70 milliGRAM(s)/deciliter  glucagon  Injectable 1 milliGRAM(s) IntraMuscular once PRN Glucose LESS THAN 70 milligrams/deciliter  ibuprofen  Tablet. 600 milliGRAM(s) Oral every 6 hours PRN mild pain (1-3) not relieved with Acetaminophen  sodium chloride 0.9% lock flush 10 milliLiter(s) IV Push every 1 hour PRN Pre/post blood products, medications, blood draw, and to maintain line patency      Weight: 136lbs  Height: 5'1", IBW 105lbs+/-10%, %%, BMI 25.7  Daily     Weight Change:   Denies any recent wt changes. Good appetite PTA.  Please trend weights while on TPN     Estimated energy needs:   IBW used for calculations as pt >120% of IBW   Nutrient needs based on [facility] standards of care for maintenance in adults.   Needs adjusted 2/2 catabolic illness, chemo treatments. Fluids adjusted 2/2 colostomy.  1428-1666kcal/day (30-35kcal/kg)  57-67g pro/day (1.2-1.4g/kg)  1428-1666ml fluid/day (30-35ml/kg)    Subjective:   58yo F with stage IV endometrial adenocarcinoma s/p staging surgery  and 1 round of chemotherapy 3 wks ago. Was planned to complete 2nd cycle of chemo today . Admitted for management of symptomatic rectovaginal and enterovaginal fistulas. On contact for Proteus ESBL & MRSA urine. Per surgery, no surgical intervention at this time. Ordered for octreotide. PICC line placed (). Pt currently ordered for  230g Dex, 79g AA, 50g 20% lipids 4x/week (1598kcal, 1.65g pro/kg IBW, GIR 2.5g). POCT B, 170, 148, 108mg/dL; lytes WNL except Na 134; Tmg/dL (3/10), 287mg/dL (3/6), 189mg/dL (3/4), 383mg/dL (3/3). Please trend TG 2x/week and monitor need for 20g 20% lipids 4x/week vs 50g. Pt seen resting in bed. Remains NPO. Denies N/V. Has some stool and gas in ostomy today. Pt remains understanding of current NPO status with advancement pending medical course. TPN order appropriate. Please obtain new wt to assess adequacy of feeds. Please see PN recs below. RD to follow.    Previous Nutrition Diagnosis:  increased nutrient needs RT increased demand for kcal/pro AEB catabolic illness, s/p chemo treatment and possible fistula losses.   Active [ x  ]  Resolved [   ]    If resolved, new PES:     Goal:  Pt to consistently meet % of estimated needs via most appropriate route    Recommendations:  1) Continue w/ current PN order: 230g Dex, 79g AA, 50g 20% lipids 4x/week (1598kcal, 1.6g pro/kg IBW, 1.3g/kg of ABW, GIR 2.5g). Monitor renal panel.   Recommend checking TG 2x/week. Check Mg, Phos, K daily and POC BG Q6hrs. Trend daily weights. Fluids and lytes per MD discretion.   2)  If TG >400mg/dL, consider switching to 20g 20% lipids vs 50g 4x/week.  3) When PO is deemed medically feasible recommend advancing to Clear Liquid diet w/ EnsureClears TID (720kcal, 24g pro)    Education:   pt understanding of meeting needs via TPN for time being.     Risk Level: High [ x  ] Moderate [   ] Low [   ].

## 2019-03-11 NOTE — PROGRESS NOTE ADULT - SUBJECTIVE AND OBJECTIVE BOX
GYN PROGRESS NOTE PGY4    Patient seen for rounds with Dr. Keita. Regarding management, discussed how patient cannot go to [facility] because she needs to continue NPO/TPN. She cannot get radiation because of the fistula. Chemo isn't currently the best option due to patient's deconditioned state. Case mgmt note says pt may receive chemo - this is not currently the plan.   Discussed with family that she needs more PT in order to improve functional status and be eligible for chemo. Family interested in private PT. Case mgmt looking into it.     Patient currently states she has abdominal pain now which was previously relieved by tylenol but she is not due for it. received protonix this AM. Will try toradol x 1. Sitting in chair. Primafit working but having some urinary leakage. No other leakage.    T(C): 36.9 (03-11-19 @ 12:40), Max: 37.2 (03-11-19 @ 05:52)  HR: 80 (03-11-19 @ 12:40) (80 - 99)  BP: 149/98 (03-11-19 @ 12:40) (127/72 - 149/98)  RR: 18 (03-11-19 @ 12:40) (16 - 18)  SpO2: 99% (03-11-19 @ 12:40) (97% - 99%)    GEN: patient appears well  LUNGS: no respiratory distress  ABD: soft, tender without palpation, non distended, ostomy w/ output  EXT: no calf tenderness        03-10 @ 07:01  -  03-11 @ 07:00  --------------------------------------------------------  IN: 2041.3 mL / OUT: 1250 mL / NET: 791.3 mL    03-11 @ 07:01  -  03-11 @ 17:06  --------------------------------------------------------  IN: 1283.1 mL / OUT: 200 mL / NET: 1083.1 mL      MEDICATIONS  (STANDING):  chlorhexidine 2% Cloths 1 Application(s) Topical <User Schedule>  dextrose 5%. 1000 milliLiter(s) (50 mL/Hr) IV Continuous <Continuous>  dextrose 50% Injectable 12.5 Gram(s) IV Push once  dextrose 50% Injectable 25 Gram(s) IV Push once  dextrose 50% Injectable 25 Gram(s) IV Push once  enoxaparin Injectable 40 milliGRAM(s) SubCutaneous daily  insulin lispro (HumaLOG) corrective regimen sliding scale   SubCutaneous every 6 hours  ketorolac   Injectable 15 milliGRAM(s) IV Push once  lactated ringers. 1000 milliLiter(s) (100 mL/Hr) IV Continuous <Continuous>  nystatin Powder 1 Application(s) Topical two times a day  pantoprazole  Injectable 40 milliGRAM(s) IV Push every 24 hours  Parenteral Nutrition - Adult 1 Each (83 mL/Hr) TPN Continuous <Continuous>  Parenteral Nutrition - Adult 1 Each (83 mL/Hr) TPN Continuous <Continuous>  zinc oxide 20% Ointment 1 Application(s) Topical three times a day    MEDICATIONS  (PRN):  acetaminophen   Tablet .. 650 milliGRAM(s) Oral every 6 hours PRN Temp greater or equal to 38C (100.4F), Mild Pain (1 - 3)  dextrose 40% Gel 15 Gram(s) Oral once PRN Blood Glucose LESS THAN 70 milliGRAM(s)/deciliter  glucagon  Injectable 1 milliGRAM(s) IntraMuscular once PRN Glucose LESS THAN 70 milligrams/deciliter  ibuprofen  Tablet. 600 milliGRAM(s) Oral every 6 hours PRN Moderate Pain (4 - 6)  sodium chloride 0.9% lock flush 10 milliLiter(s) IV Push every 1 hour PRN Pre/post blood products, medications, blood draw, and to maintain line patency

## 2019-03-12 LAB
ANION GAP SERPL CALC-SCNC: 11 MMOL/L — SIGNIFICANT CHANGE UP (ref 5–17)
BASOPHILS # BLD AUTO: 0.02 K/UL — SIGNIFICANT CHANGE UP (ref 0–0.2)
BASOPHILS NFR BLD AUTO: 0.2 % — SIGNIFICANT CHANGE UP (ref 0–2)
BUN SERPL-MCNC: 28 MG/DL — HIGH (ref 7–23)
CALCIUM SERPL-MCNC: 9.6 MG/DL — SIGNIFICANT CHANGE UP (ref 8.4–10.5)
CHLORIDE SERPL-SCNC: 100 MMOL/L — SIGNIFICANT CHANGE UP (ref 96–108)
CO2 SERPL-SCNC: 26 MMOL/L — SIGNIFICANT CHANGE UP (ref 22–31)
CREAT SERPL-MCNC: 0.52 MG/DL — SIGNIFICANT CHANGE UP (ref 0.5–1.3)
EOSINOPHIL # BLD AUTO: 0.05 K/UL — SIGNIFICANT CHANGE UP (ref 0–0.5)
EOSINOPHIL NFR BLD AUTO: 0.4 % — SIGNIFICANT CHANGE UP (ref 0–6)
GLUCOSE BLDC GLUCOMTR-MCNC: 141 MG/DL — HIGH (ref 70–99)
GLUCOSE BLDC GLUCOMTR-MCNC: 183 MG/DL — HIGH (ref 70–99)
GLUCOSE BLDC GLUCOMTR-MCNC: 194 MG/DL — HIGH (ref 70–99)
GLUCOSE BLDC GLUCOMTR-MCNC: 198 MG/DL — HIGH (ref 70–99)
GLUCOSE BLDC GLUCOMTR-MCNC: 207 MG/DL — HIGH (ref 70–99)
GLUCOSE SERPL-MCNC: 135 MG/DL — HIGH (ref 70–99)
HCT VFR BLD CALC: 25.2 % — LOW (ref 34.5–45)
HGB BLD-MCNC: 7.9 G/DL — LOW (ref 11.5–15.5)
IMM GRANULOCYTES NFR BLD AUTO: 0.4 % — SIGNIFICANT CHANGE UP (ref 0–1.5)
LYMPHOCYTES # BLD AUTO: 1.07 K/UL — SIGNIFICANT CHANGE UP (ref 1–3.3)
LYMPHOCYTES # BLD AUTO: 9.6 % — LOW (ref 13–44)
MAGNESIUM SERPL-MCNC: 1.6 MG/DL — SIGNIFICANT CHANGE UP (ref 1.6–2.6)
MCHC RBC-ENTMCNC: 29.8 PG — SIGNIFICANT CHANGE UP (ref 27–34)
MCHC RBC-ENTMCNC: 31.3 GM/DL — LOW (ref 32–36)
MCV RBC AUTO: 95.1 FL — SIGNIFICANT CHANGE UP (ref 80–100)
MONOCYTES # BLD AUTO: 0.67 K/UL — SIGNIFICANT CHANGE UP (ref 0–0.9)
MONOCYTES NFR BLD AUTO: 6 % — SIGNIFICANT CHANGE UP (ref 2–14)
NEUTROPHILS # BLD AUTO: 9.27 K/UL — HIGH (ref 1.8–7.4)
NEUTROPHILS NFR BLD AUTO: 83.4 % — HIGH (ref 43–77)
NRBC # BLD: 0 /100 WBCS — SIGNIFICANT CHANGE UP (ref 0–0)
PHOSPHATE SERPL-MCNC: 4 MG/DL — SIGNIFICANT CHANGE UP (ref 2.5–4.5)
PLATELET # BLD AUTO: 245 K/UL — SIGNIFICANT CHANGE UP (ref 150–400)
POTASSIUM SERPL-MCNC: 3.8 MMOL/L — SIGNIFICANT CHANGE UP (ref 3.5–5.3)
POTASSIUM SERPL-SCNC: 3.8 MMOL/L — SIGNIFICANT CHANGE UP (ref 3.5–5.3)
RBC # BLD: 2.65 M/UL — LOW (ref 3.8–5.2)
RBC # FLD: 16.7 % — HIGH (ref 10.3–14.5)
SODIUM SERPL-SCNC: 137 MMOL/L — SIGNIFICANT CHANGE UP (ref 135–145)
WBC # BLD: 11.12 K/UL — HIGH (ref 3.8–10.5)
WBC # FLD AUTO: 11.12 K/UL — HIGH (ref 3.8–10.5)

## 2019-03-12 PROCEDURE — 99232 SBSQ HOSP IP/OBS MODERATE 35: CPT

## 2019-03-12 RX ORDER — ERTAPENEM SODIUM 1 G/1
INJECTION, POWDER, LYOPHILIZED, FOR SOLUTION INTRAMUSCULAR; INTRAVENOUS
Qty: 0 | Refills: 0 | Status: COMPLETED | OUTPATIENT
Start: 2019-03-12 | End: 2019-03-18

## 2019-03-12 RX ORDER — ERTAPENEM SODIUM 1 G/1
1000 INJECTION, POWDER, LYOPHILIZED, FOR SOLUTION INTRAMUSCULAR; INTRAVENOUS ONCE
Qty: 0 | Refills: 0 | Status: COMPLETED | OUTPATIENT
Start: 2019-03-12 | End: 2019-03-12

## 2019-03-12 RX ORDER — ERTAPENEM SODIUM 1 G/1
1000 INJECTION, POWDER, LYOPHILIZED, FOR SOLUTION INTRAMUSCULAR; INTRAVENOUS EVERY 24 HOURS
Qty: 0 | Refills: 0 | Status: COMPLETED | OUTPATIENT
Start: 2019-03-13 | End: 2019-03-18

## 2019-03-12 RX ORDER — ONDANSETRON 8 MG/1
4 TABLET, FILM COATED ORAL ONCE
Qty: 0 | Refills: 0 | Status: COMPLETED | OUTPATIENT
Start: 2019-03-12 | End: 2019-03-12

## 2019-03-12 RX ORDER — VANCOMYCIN HCL 1 G
1000 VIAL (EA) INTRAVENOUS EVERY 12 HOURS
Qty: 0 | Refills: 0 | Status: DISCONTINUED | OUTPATIENT
Start: 2019-03-12 | End: 2019-03-16

## 2019-03-12 RX ORDER — I.V. FAT EMULSION 20 G/100ML
50 EMULSION INTRAVENOUS ONCE
Qty: 0 | Refills: 0 | Status: COMPLETED | OUTPATIENT
Start: 2019-03-12 | End: 2019-03-12

## 2019-03-12 RX ORDER — ACETAMINOPHEN 500 MG
1000 TABLET ORAL ONCE
Qty: 0 | Refills: 0 | Status: COMPLETED | OUTPATIENT
Start: 2019-03-12 | End: 2019-03-12

## 2019-03-12 RX ORDER — ELECTROLYTE SOLUTION,INJ
1 VIAL (ML) INTRAVENOUS
Qty: 0 | Refills: 0 | Status: DISCONTINUED | OUTPATIENT
Start: 2019-03-12 | End: 2019-03-12

## 2019-03-12 RX ADMIN — Medication 2: at 06:55

## 2019-03-12 RX ADMIN — ZINC OXIDE 1 APPLICATION(S): 200 OINTMENT TOPICAL at 06:56

## 2019-03-12 RX ADMIN — ENOXAPARIN SODIUM 40 MILLIGRAM(S): 100 INJECTION SUBCUTANEOUS at 12:22

## 2019-03-12 RX ADMIN — I.V. FAT EMULSION 20.83 GRAM(S): 20 EMULSION INTRAVENOUS at 22:41

## 2019-03-12 RX ADMIN — FAMOTIDINE 104 MILLIGRAM(S): 10 INJECTION INTRAVENOUS at 12:27

## 2019-03-12 RX ADMIN — Medication 1 EACH: at 17:54

## 2019-03-12 RX ADMIN — NYSTATIN CREAM 1 APPLICATION(S): 100000 CREAM TOPICAL at 17:54

## 2019-03-12 RX ADMIN — Medication 250 MILLIGRAM(S): at 13:01

## 2019-03-12 RX ADMIN — ZINC OXIDE 1 APPLICATION(S): 200 OINTMENT TOPICAL at 13:01

## 2019-03-12 RX ADMIN — PANTOPRAZOLE SODIUM 40 MILLIGRAM(S): 20 TABLET, DELAYED RELEASE ORAL at 06:54

## 2019-03-12 RX ADMIN — Medication 1000 MILLIGRAM(S): at 14:10

## 2019-03-12 RX ADMIN — SODIUM CHLORIDE 100 MILLILITER(S): 9 INJECTION, SOLUTION INTRAVENOUS at 17:54

## 2019-03-12 RX ADMIN — Medication 2: at 17:23

## 2019-03-12 RX ADMIN — ERTAPENEM SODIUM 120 MILLIGRAM(S): 1 INJECTION, POWDER, LYOPHILIZED, FOR SOLUTION INTRAMUSCULAR; INTRAVENOUS at 14:56

## 2019-03-12 RX ADMIN — CHLORHEXIDINE GLUCONATE 1 APPLICATION(S): 213 SOLUTION TOPICAL at 06:55

## 2019-03-12 RX ADMIN — ZINC OXIDE 1 APPLICATION(S): 200 OINTMENT TOPICAL at 22:41

## 2019-03-12 RX ADMIN — NYSTATIN CREAM 1 APPLICATION(S): 100000 CREAM TOPICAL at 06:56

## 2019-03-12 RX ADMIN — FAMOTIDINE 104 MILLIGRAM(S): 10 INJECTION INTRAVENOUS at 22:41

## 2019-03-12 RX ADMIN — Medication 400 MILLIGRAM(S): at 14:10

## 2019-03-12 RX ADMIN — ONDANSETRON 4 MILLIGRAM(S): 8 TABLET, FILM COATED ORAL at 20:06

## 2019-03-12 RX ADMIN — SODIUM CHLORIDE 100 MILLILITER(S): 9 INJECTION, SOLUTION INTRAVENOUS at 06:57

## 2019-03-12 NOTE — PROGRESS NOTE ADULT - SUBJECTIVE AND OBJECTIVE BOX
Pt seen and examined at bedside with Dr. Spivey and Dr. Keita. Pt complaining of feeling chills since the morning. She states she has increased vaginal itching and burning at site of primaFit. She states feeling less energetic today and more weak. Abdominal pain more on LLQ is the same as what it was yesterday-she did not ask for pain medication today. She only received IV Tylenol for fever.     Pt denies fever, chills, chest pain, SOB, nausea, vomiting, lightheadedness, or dizziness.      T(F): 99.8 (03-12-19 @ 14:58), Max: 100.7 (03-12-19 @ 13:50)  HR: 97 (03-12-19 @ 14:58) (77 - 97)  BP: 120/74 (03-12-19 @ 14:58) (110/71 - 164/93)  RR: 16 (03-12-19 @ 14:58) (15 - 17)  SpO2: 94% (03-12-19 @ 14:58) (94% - 99%)  Wt(kg): --  I&O's Summary    11 Mar 2019 07:01  -  12 Mar 2019 07:00  --------------------------------------------------------  IN: 2199.6 mL / OUT: 1600 mL / NET: 599.6 mL    12 Mar 2019 07:01  -  12 Mar 2019 17:05  --------------------------------------------------------  IN: 0 mL / OUT: 450 mL / NET: -450 mL    MEDICATIONS  (STANDING):  chlorhexidine 2% Cloths 1 Application(s) Topical <User Schedule>  dextrose 5%. 1000 milliLiter(s) (50 mL/Hr) IV Continuous <Continuous>  dextrose 50% Injectable 12.5 Gram(s) IV Push once  dextrose 50% Injectable 25 Gram(s) IV Push once  dextrose 50% Injectable 25 Gram(s) IV Push once  enoxaparin Injectable 40 milliGRAM(s) SubCutaneous daily  ertapenem  IVPB      famotidine  IVPB 20 milliGRAM(s) IV Intermittent two times a day  fat emulsion (Plant Based) 20% IVPB 50 Gram(s) IV Intermittent once  insulin lispro (HumaLOG) corrective regimen sliding scale   SubCutaneous every 6 hours  lactated ringers. 1000 milliLiter(s) (100 mL/Hr) IV Continuous <Continuous>  nystatin Powder 1 Application(s) Topical two times a day  pantoprazole  Injectable 40 milliGRAM(s) IV Push every 24 hours  Parenteral Nutrition - Adult 1 Each (83 mL/Hr) TPN Continuous <Continuous>  Parenteral Nutrition - Adult 1 Each (83 mL/Hr) TPN Continuous <Continuous>  vancomycin  IVPB 1000 milliGRAM(s) IV Intermittent every 12 hours  zinc oxide 20% Ointment 1 Application(s) Topical three times a day    MEDICATIONS  (PRN):  acetaminophen   Tablet .. 650 milliGRAM(s) Oral every 6 hours PRN Temp greater or equal to 38C (100.4F), Mild Pain (1 - 3)  dextrose 40% Gel 15 Gram(s) Oral once PRN Blood Glucose LESS THAN 70 milliGRAM(s)/deciliter  glucagon  Injectable 1 milliGRAM(s) IntraMuscular once PRN Glucose LESS THAN 70 milligrams/deciliter  sodium chloride 0.9% lock flush 10 milliLiter(s) IV Push every 1 hour PRN Pre/post blood products, medications, blood draw, and to maintain line patency    Physical Exam:  Constitutional: NAD  Pulmonary: clear to auscultation bilaterally   Cardiovascular: Regular rate and rhythm   Abdomen: Soft, nontender, nondistended, no guarding, no rebound, ostomy in place with brown stool output   Extremities: no lower extremity edema or calve tenderness. SCDs in place     LABS:                        7.9    11.12 )-----------( 245      ( 12 Mar 2019 09:01 )             25.2     03-12    137  |  100  |  28<H>  ----------------------------<  135<H>  3.8   |  26  |  0.52    Ca    9.6      12 Mar 2019 09:00  Phos  4.0     03-12  Mg     1.6     03-12    TPro  6.6  /  Alb  3.2<L>  /  TBili  0.2  /  DBili  x   /  AST  15  /  ALT  13  /  AlkPhos  92  03-11    PT/INR - ( 11 Mar 2019 08:04 )   PT: 12.0 sec;   INR: 1.06          PTT - ( 11 Mar 2019 08:04 )  PTT:30.4 sec

## 2019-03-12 NOTE — PROGRESS NOTE ADULT - SUBJECTIVE AND OBJECTIVE BOX
GYN PROGRESS NOTE PGY4    Patient evaluated at the bedside. She is currently afebrile. Feeling better. No complaints. Family at bedside. Said she got placement at [place] which is [facility] which accepts TPN however she had a fever today and has ESBL so this must first be addressed to see if she can still go there.     T(C): 37.3 (03-12-19 @ 16:32), Max: 38.2 (03-12-19 @ 13:50)  HR: 90 (03-12-19 @ 16:32) (77 - 97)  BP: 126/78 (03-12-19 @ 16:32) (110/71 - 164/93)  RR: 17 (03-12-19 @ 16:32) (15 - 17)  SpO2: 94% (03-12-19 @ 16:32) (94% - 99%)    GEN: patient appears well  LUNGS: no respiratory distress  ABD: soft, nt, nd, ostomy with output  EXT: no calf tenderness; scds on; no sign of infection at PICC site      03-11 @ 07:01  -  03-12 @ 07:00  --------------------------------------------------------  IN: 2199.6 mL / OUT: 1600 mL / NET: 599.6 mL    03-12 @ 07:01  -  03-12 @ 18:47  --------------------------------------------------------  IN: 0 mL / OUT: 450 mL / NET: -450 mL      MEDICATIONS  (STANDING):  chlorhexidine 2% Cloths 1 Application(s) Topical <User Schedule>  dextrose 5%. 1000 milliLiter(s) (50 mL/Hr) IV Continuous <Continuous>  dextrose 50% Injectable 12.5 Gram(s) IV Push once  dextrose 50% Injectable 25 Gram(s) IV Push once  dextrose 50% Injectable 25 Gram(s) IV Push once  enoxaparin Injectable 40 milliGRAM(s) SubCutaneous daily  ertapenem  IVPB      famotidine  IVPB 20 milliGRAM(s) IV Intermittent two times a day  fat emulsion (Plant Based) 20% IVPB 50 Gram(s) IV Intermittent once  insulin lispro (HumaLOG) corrective regimen sliding scale   SubCutaneous every 6 hours  lactated ringers. 1000 milliLiter(s) (100 mL/Hr) IV Continuous <Continuous>  nystatin Powder 1 Application(s) Topical two times a day  pantoprazole  Injectable 40 milliGRAM(s) IV Push every 24 hours  Parenteral Nutrition - Adult 1 Each (83 mL/Hr) TPN Continuous <Continuous>  Parenteral Nutrition - Adult 1 Each (83 mL/Hr) TPN Continuous <Continuous>  vancomycin  IVPB 1000 milliGRAM(s) IV Intermittent every 12 hours  zinc oxide 20% Ointment 1 Application(s) Topical three times a day    MEDICATIONS  (PRN):  acetaminophen   Tablet .. 650 milliGRAM(s) Oral every 6 hours PRN Temp greater or equal to 38C (100.4F), Mild Pain (1 - 3)  dextrose 40% Gel 15 Gram(s) Oral once PRN Blood Glucose LESS THAN 70 milliGRAM(s)/deciliter  glucagon  Injectable 1 milliGRAM(s) IntraMuscular once PRN Glucose LESS THAN 70 milligrams/deciliter  sodium chloride 0.9% lock flush 10 milliLiter(s) IV Push every 1 hour PRN Pre/post blood products, medications, blood draw, and to maintain line patency

## 2019-03-12 NOTE — PROGRESS NOTE ADULT - ASSESSMENT
58 yo woman with Hx. 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 She noted abnormal drainage about 2 weeks ago and was admitted to [facility] transferred here for more specialized care. Upon arrival joseph catheter placed, with initial urine culture negative. Patient with two subsequent cultures growing ESBL and MRSA. Started on Bactrim for asymptomatic bacturia. Most recent cultures with Bactrim resistance. ID consulted for abx regimen given this culture data. D'cd abx given indwelling joseph. Joseph since d'cd 3/9 with new fever and elevated white count, patient with malaise.  Recommend:   - start vancomycin and ertapenem given cx data and now w/o joseph  - vanc trough before 4th dose    Will continue to follow

## 2019-03-12 NOTE — PROGRESS NOTE ADULT - SUBJECTIVE AND OBJECTIVE BOX
GYN PROGRESS NOTE PGY3    FROM NOTE YESTERDAY: Patient seen for rounds with Dr. Keita. Regarding management, discussed how patient cannot go to [facility] because she needs to continue NPO/TPN. She cannot get radiation because of the fistula. Chemo isn't currently the best option due to patient's deconditioned state. Case mgmt note says pt may receive chemo - this is not currently the plan. Discussed with family that she needs more PT in order to improve functional status and be eligible for chemo. Family interested in private PT. Case mgmt looking into it.     Patient currently resting. comfortable.      T(C): 37.3 (03-12-19 @ 06:09), Max: 37.3 (03-12-19 @ 06:09)  HR: 87 (03-12-19 @ 06:09) (77 - 88)  BP: 147/91 (03-12-19 @ 06:09) (110/71 - 149/98)  RR: 17 (03-12-19 @ 06:09) (16 - 18)  SpO2: 94% (03-12-19 @ 06:09) (94% - 99%)            03-10 @ 07:01  -  03-11 @ 07:00  --------------------------------------------------------  IN: 2041.3 mL / OUT: 1250 mL / NET: 791.3 mL    03-11 @ 07:01  -  03-12 @ 06:48  --------------------------------------------------------  IN: 2199.6 mL / OUT: 1600 mL / NET: 599.6 mL    MEDICATIONS  (STANDING):  chlorhexidine 2% Cloths 1 Application(s) Topical <User Schedule>  dextrose 5%. 1000 milliLiter(s) (50 mL/Hr) IV Continuous <Continuous>  dextrose 50% Injectable 12.5 Gram(s) IV Push once  dextrose 50% Injectable 25 Gram(s) IV Push once  dextrose 50% Injectable 25 Gram(s) IV Push once  enoxaparin Injectable 40 milliGRAM(s) SubCutaneous daily  famotidine  IVPB 20 milliGRAM(s) IV Intermittent two times a day  insulin lispro (HumaLOG) corrective regimen sliding scale   SubCutaneous every 6 hours  lactated ringers. 1000 milliLiter(s) (100 mL/Hr) IV Continuous <Continuous>  nystatin Powder 1 Application(s) Topical two times a day  pantoprazole  Injectable 40 milliGRAM(s) IV Push every 24 hours  Parenteral Nutrition - Adult 1 Each (83 mL/Hr) TPN Continuous <Continuous>  zinc oxide 20% Ointment 1 Application(s) Topical three times a day    MEDICATIONS  (PRN):  acetaminophen   Tablet .. 650 milliGRAM(s) Oral every 6 hours PRN Temp greater or equal to 38C (100.4F), Mild Pain (1 - 3)  dextrose 40% Gel 15 Gram(s) Oral once PRN Blood Glucose LESS THAN 70 milliGRAM(s)/deciliter  glucagon  Injectable 1 milliGRAM(s) IntraMuscular once PRN Glucose LESS THAN 70 milligrams/deciliter  ibuprofen  Tablet. 600 milliGRAM(s) Oral every 6 hours PRN Moderate Pain (4 - 6)  sodium chloride 0.9% lock flush 10 milliLiter(s) IV Push every 1 hour PRN Pre/post blood products, medications, blood draw, and to maintain line patency

## 2019-03-12 NOTE — PROGRESS NOTE ADULT - ASSESSMENT
58yo F HD19 w/ known stage IV endometrial adenocarcinoma s/p staging surgery '18 and 1 round of chemotherapy 2/19 admitted for management of symptomatic rectovaginal and enterovaginal fistulas, now with complex fistula associated bacteruria, hemodynamically stable and afebrile. Working on dispo plan.    1. ID: urine culture from 2/27 positive for ESBL E. coli, associated due to complex fistula, s/p treatement with bactrim DS BID on 3/1-3/8, repeat urine cx 3/6 w/ MRSA and ESBL proteus resistant to TMP-SMX   -ID consulted, recommended discontinuing antibiotics following fever curve and WBC, remove joseph and possible evaluation for vesicoenteric fistula, states no antibiotics needed in asymptomatic bacteriuria patients that are chronically catheterized  2. FEN/GI - NPO, IVH, replete electrolytes PRN, octreotide daily on TPN, PICC placed 2/27, electrolytes repleted via TPN, lipids qod  3. PAIN - overall well controlled, tylenol, motrin PRN (gave toradol x 1 3/11 with relief)  4.  - s/p joseph, using primafit catheter which patient is doing well with; will use peripads when ambulating  -intermittent periods of leakage of scant amount of stool per vagina decreasing in frequency, general surgery consulted for recommendations on management of rectovaginal/enterovaginal fistulas, no surgical intervention at this time  -continue with nystatin powder and ointment for mons and labial irritation as well as zinc oxide  5. Endocrine- ISS while patient remains NPO, holding home metformin at this time  6. RESP-  Saturating well on room air, encouraged ISS  8. VTE prophylaxis - SCDs, ambulate as tolerated with PT, Lovenox 40qd, PT    9. PT recommending [facility], pt can't go on TPN, case mgmt looking into options for [facility] that accepts TPN  - PT consulted to walk patient, as patient needs to be ambulating daily and daily exercises   - looking into inpatient private PT  10. Palliative: palliative consulted, appreciate recommendations  - radiation oncology consulted, recommended biopsy to confirm recurrence before considering radiation therapy, however no longer considering RT given relative contraindications w/ fistula and better options available (?chemo)  - possible chemotherapy when functional status improves  - pt no longer desires Lexapro 10mg qhs

## 2019-03-12 NOTE — PROGRESS NOTE ADULT - ASSESSMENT
60yo F HD19 w/ known stage IV endometrial adenocarcinoma s/p staging surgery '18 and 1 round of chemotherapy 2/19 admitted for management of symptomatic rectovaginal and enterovaginal fistulas, now with complex fistula associated bacteruria s/p IV bactrim. Pt afebrile to 100.7 F today with increased WBC. ID recommends IV Vancomycin and Ertapenem. Will obtain CT of abdomen and pelvis if Pt clinically worsens or does not improve.     1. ID: urine culture from 2/27 positive for ESBL E. coli, associated due to complex fistula, s/p treatement with bactrim DS BID on 3/1-3/8, repeat urine cx 3/6 w/ MRSA and ESBL proteus resistant to TMP-SMX.   ID started IV Vancomycin 1g BID and IV Ertapenem 1g daily 3/12- Appreciate ID recs   2. FEN/GI - NPO, IVH, replete electrolytes PRN, octreotide daily on TPN, PICC placed 2/27, electrolytes repleted via TPN, lipids every other day- next 3/14   3. PAIN - Tylenol PRN   4.  - s/p joseph, using primafit catheter- adequate urine output- use josseline pads when ambulating     -intermittent periods of leakage of scant amount of stool per vagina decreasing in frequency, general surgery consulted for recommendations on management of rectovaginal/enterovaginal fistulas, no surgical intervention at this time  -continue with nystatin powder and ointment for mons and labial irritation as well as zinc oxide  5. Endocrine- ISS while patient remains NPO, holding home metformin at this time  6. RESP-  Saturating well on room air, encouraged ISS  8. VTE prophylaxis - SCDs, ambulate as tolerated with PT, Lovenox 40qd, PT    9. PT recommending [facility]  - L[facility]O placement at [facility] in [place] that will provide TPN   - PT consulted to walk patient, as patient needs to be ambulating daily and daily exercises   10. Palliative: palliative consulted, appreciate recommendations  - radiation oncology consulted, recommended biopsy to confirm recurrence before considering radiation therapy, however no longer considering RT given relative contraindications w/ fistula   - pt no longer desires Lexapro 10mg qhs

## 2019-03-12 NOTE — PROGRESS NOTE ADULT - ASSESSMENT
1. FEN - TPN, IVH   2. PAIN - OPM  3.  - primafit  4. RESP -  IS  5. VTE prophylaxis - lovenox, scds, ambulate with PT  6. LABS - in AM or sooner if needed  7. ID - vanc and ertapenem, f/u PICC blood cx

## 2019-03-12 NOTE — PROGRESS NOTE ADULT - SUBJECTIVE AND OBJECTIVE BOX
INTERVAL HPI/OVERNIGHT EVENTS:    CONSTITUTIONAL:  Negative fever or chills, feels well, good appetite  EYES:  Negative  blurry vision or double vision  CARDIOVASCULAR:  Negative for chest pain or palpitations  RESPIRATORY:  Negative for cough, wheezing, or SOB   GASTROINTESTINAL:  Negative for nausea, vomiting, diarrhea, constipation, or abdominal pain  GENITOURINARY:  Negative frequency, urgency or dysuria  NEUROLOGIC:  No headache, confusion, dizziness, lightheadedness      ANTIBIOTICS/RELEVANT:    MEDICATIONS  (STANDING):  chlorhexidine 2% Cloths 1 Application(s) Topical <User Schedule>  dextrose 5%. 1000 milliLiter(s) (50 mL/Hr) IV Continuous <Continuous>  dextrose 50% Injectable 12.5 Gram(s) IV Push once  dextrose 50% Injectable 25 Gram(s) IV Push once  dextrose 50% Injectable 25 Gram(s) IV Push once  enoxaparin Injectable 40 milliGRAM(s) SubCutaneous daily  famotidine  IVPB 20 milliGRAM(s) IV Intermittent two times a day  insulin lispro (HumaLOG) corrective regimen sliding scale   SubCutaneous every 6 hours  lactated ringers. 1000 milliLiter(s) (100 mL/Hr) IV Continuous <Continuous>  nystatin Powder 1 Application(s) Topical two times a day  pantoprazole  Injectable 40 milliGRAM(s) IV Push every 24 hours  Parenteral Nutrition - Adult 1 Each (83 mL/Hr) TPN Continuous <Continuous>  zinc oxide 20% Ointment 1 Application(s) Topical three times a day    MEDICATIONS  (PRN):  acetaminophen   Tablet .. 650 milliGRAM(s) Oral every 6 hours PRN Temp greater or equal to 38C (100.4F), Mild Pain (1 - 3)  dextrose 40% Gel 15 Gram(s) Oral once PRN Blood Glucose LESS THAN 70 milliGRAM(s)/deciliter  glucagon  Injectable 1 milliGRAM(s) IntraMuscular once PRN Glucose LESS THAN 70 milligrams/deciliter  ibuprofen  Tablet. 600 milliGRAM(s) Oral every 6 hours PRN Moderate Pain (4 - 6)  sodium chloride 0.9% lock flush 10 milliLiter(s) IV Push every 1 hour PRN Pre/post blood products, medications, blood draw, and to maintain line patency        Vital Signs Last 24 Hrs  T(C): 37.9 (12 Mar 2019 09:15), Max: 37.9 (12 Mar 2019 09:15)  T(F): 100.2 (12 Mar 2019 09:15), Max: 100.2 (12 Mar 2019 09:15)  HR: 93 (12 Mar 2019 09:15) (77 - 93)  BP: 154/84 (12 Mar 2019 09:15) (110/71 - 154/84)  BP(mean): --  RR: 16 (12 Mar 2019 09:15) (16 - 18)  SpO2: 99% (12 Mar 2019 09:15) (94% - 99%)    03-11-19 @ 07:01  -  03-12-19 @ 07:00  --------------------------------------------------------  IN: 2199.6 mL / OUT: 1600 mL / NET: 599.6 mL      PHYSICAL EXAM:  Constitutional:Well-developed, well nourished  Eyes:THOMAS, EOMI  Ear/Nose/Throat: no oral lesion, no sinus tenderness on percussion	  Neck:no JVD, no lymphadenopathy, supple  Respiratory: CTA heath  Cardiovascular: S1S2 RRR, no murmurs  Gastrointestinal:soft, (+) BS, no HSM  Extremities:no e/e/c  Vascular: DP Pulse:	right normal; left normal      LABS:                        7.9    11.12 )-----------( 245      ( 12 Mar 2019 09:01 )             25.2     03-12    137  |  100  |  28<H>  ----------------------------<  135<H>  3.8   |  26  |  0.52    Ca    9.6      12 Mar 2019 09:00  Phos  4.0     03-12  Mg     1.6     03-12    TPro  6.6  /  Alb  3.2<L>  /  TBili  0.2  /  DBili  x   /  AST  15  /  ALT  13  /  AlkPhos  92  03-11    PT/INR - ( 11 Mar 2019 08:04 )   PT: 12.0 sec;   INR: 1.06          PTT - ( 11 Mar 2019 08:04 )  PTT:30.4 sec      MICROBIOLOGY:    RADIOLOGY & ADDITIONAL STUDIES: INTERVAL HPI/OVERNIGHT EVENTS: Patient febrile this AM with increasing white count. S/p joseph removal 3/9.    CONSTITUTIONAL:  + fever and chills, feels well, good appetite  EYES:  Negative  blurry vision or double vision  CARDIOVASCULAR:  Negative for chest pain or palpitations  RESPIRATORY:  Negative for cough, wheezing, or SOB   GASTROINTESTINAL:  Negative for nausea, vomiting, diarrhea, constipation, or abdominal pain  GENITOURINARY:  Negative frequency, urgency or dysuria  NEUROLOGIC:  No headache, confusion, dizziness, lightheadedness      ANTIBIOTICS/RELEVANT: none    MEDICATIONS  (STANDING):  chlorhexidine 2% Cloths 1 Application(s) Topical <User Schedule>  dextrose 5%. 1000 milliLiter(s) (50 mL/Hr) IV Continuous <Continuous>  dextrose 50% Injectable 12.5 Gram(s) IV Push once  dextrose 50% Injectable 25 Gram(s) IV Push once  dextrose 50% Injectable 25 Gram(s) IV Push once  enoxaparin Injectable 40 milliGRAM(s) SubCutaneous daily  famotidine  IVPB 20 milliGRAM(s) IV Intermittent two times a day  insulin lispro (HumaLOG) corrective regimen sliding scale   SubCutaneous every 6 hours  lactated ringers. 1000 milliLiter(s) (100 mL/Hr) IV Continuous <Continuous>  nystatin Powder 1 Application(s) Topical two times a day  pantoprazole  Injectable 40 milliGRAM(s) IV Push every 24 hours  Parenteral Nutrition - Adult 1 Each (83 mL/Hr) TPN Continuous <Continuous>  zinc oxide 20% Ointment 1 Application(s) Topical three times a day    MEDICATIONS  (PRN):  acetaminophen   Tablet .. 650 milliGRAM(s) Oral every 6 hours PRN Temp greater or equal to 38C (100.4F), Mild Pain (1 - 3)  dextrose 40% Gel 15 Gram(s) Oral once PRN Blood Glucose LESS THAN 70 milliGRAM(s)/deciliter  glucagon  Injectable 1 milliGRAM(s) IntraMuscular once PRN Glucose LESS THAN 70 milligrams/deciliter  ibuprofen  Tablet. 600 milliGRAM(s) Oral every 6 hours PRN Moderate Pain (4 - 6)  sodium chloride 0.9% lock flush 10 milliLiter(s) IV Push every 1 hour PRN Pre/post blood products, medications, blood draw, and to maintain line patency        Vital Signs Last 24 Hrs  T(C): 37.9 (12 Mar 2019 09:15), Max: 37.9 (12 Mar 2019 09:15)  T(F): 100.2 (12 Mar 2019 09:15), Max: 100.2 (12 Mar 2019 09:15)  HR: 93 (12 Mar 2019 09:15) (77 - 93)  BP: 154/84 (12 Mar 2019 09:15) (110/71 - 154/84)  BP(mean): --  RR: 16 (12 Mar 2019 09:15) (16 - 18)  SpO2: 99% (12 Mar 2019 09:15) (94% - 99%)    03-11-19 @ 07:01  -  03-12-19 @ 07:00  --------------------------------------------------------  IN: 2199.6 mL / OUT: 1600 mL / NET: 599.6 mL      Physical Exam:  Constitutional: visibly shivering, sick appearing  Pulmonary: clear to auscultation bilaterally   Cardiovascular: Regular rate and rhythm   Abdomen: Soft, nontender, nondistended, no guarding, no rebound, ostomy in place with brown stool output   Extremities: no lower extremity edema or calve tenderness. SCDs in place       LABS:                        7.9    11.12 )-----------( 245      ( 12 Mar 2019 09:01 )             25.2     03-12    137  |  100  |  28<H>  ----------------------------<  135<H>  3.8   |  26  |  0.52    Ca    9.6      12 Mar 2019 09:00  Phos  4.0     03-12  Mg     1.6     03-12    TPro  6.6  /  Alb  3.2<L>  /  TBili  0.2  /  DBili  x   /  AST  15  /  ALT  13  /  AlkPhos  92  03-11    PT/INR - ( 11 Mar 2019 08:04 )   PT: 12.0 sec;   INR: 1.06          PTT - ( 11 Mar 2019 08:04 )  PTT:30.4 sec      MICROBIOLOGY: Reviewed    RADIOLOGY & ADDITIONAL STUDIES: Reviewed

## 2019-03-13 LAB
ALBUMIN SERPL ELPH-MCNC: 2.9 G/DL — LOW (ref 3.3–5)
ALP SERPL-CCNC: 105 U/L — SIGNIFICANT CHANGE UP (ref 40–120)
ALT FLD-CCNC: 22 U/L — SIGNIFICANT CHANGE UP (ref 10–45)
ANION GAP SERPL CALC-SCNC: 10 MMOL/L — SIGNIFICANT CHANGE UP (ref 5–17)
AST SERPL-CCNC: 23 U/L — SIGNIFICANT CHANGE UP (ref 10–40)
BASOPHILS # BLD AUTO: 0.02 K/UL — SIGNIFICANT CHANGE UP (ref 0–0.2)
BASOPHILS NFR BLD AUTO: 0.3 % — SIGNIFICANT CHANGE UP (ref 0–2)
BILIRUB SERPL-MCNC: 0.2 MG/DL — SIGNIFICANT CHANGE UP (ref 0.2–1.2)
BUN SERPL-MCNC: 21 MG/DL — SIGNIFICANT CHANGE UP (ref 7–23)
CALCIUM SERPL-MCNC: 9.2 MG/DL — SIGNIFICANT CHANGE UP (ref 8.4–10.5)
CHLORIDE SERPL-SCNC: 101 MMOL/L — SIGNIFICANT CHANGE UP (ref 96–108)
CO2 SERPL-SCNC: 28 MMOL/L — SIGNIFICANT CHANGE UP (ref 22–31)
CREAT SERPL-MCNC: 0.54 MG/DL — SIGNIFICANT CHANGE UP (ref 0.5–1.3)
EOSINOPHIL # BLD AUTO: 0.1 K/UL — SIGNIFICANT CHANGE UP (ref 0–0.5)
EOSINOPHIL NFR BLD AUTO: 1.5 % — SIGNIFICANT CHANGE UP (ref 0–6)
GLUCOSE BLDC GLUCOMTR-MCNC: 146 MG/DL — HIGH (ref 70–99)
GLUCOSE BLDC GLUCOMTR-MCNC: 151 MG/DL — HIGH (ref 70–99)
GLUCOSE BLDC GLUCOMTR-MCNC: 157 MG/DL — HIGH (ref 70–99)
GLUCOSE BLDC GLUCOMTR-MCNC: 170 MG/DL — HIGH (ref 70–99)
GLUCOSE SERPL-MCNC: 163 MG/DL — HIGH (ref 70–99)
HCT VFR BLD CALC: 22.6 % — LOW (ref 34.5–45)
HGB BLD-MCNC: 7.1 G/DL — LOW (ref 11.5–15.5)
IMM GRANULOCYTES NFR BLD AUTO: 0.3 % — SIGNIFICANT CHANGE UP (ref 0–1.5)
LYMPHOCYTES # BLD AUTO: 1.49 K/UL — SIGNIFICANT CHANGE UP (ref 1–3.3)
LYMPHOCYTES # BLD AUTO: 21.7 % — SIGNIFICANT CHANGE UP (ref 13–44)
MAGNESIUM SERPL-MCNC: 1.7 MG/DL — SIGNIFICANT CHANGE UP (ref 1.6–2.6)
MCHC RBC-ENTMCNC: 30.2 PG — SIGNIFICANT CHANGE UP (ref 27–34)
MCHC RBC-ENTMCNC: 31.4 GM/DL — LOW (ref 32–36)
MCV RBC AUTO: 96.2 FL — SIGNIFICANT CHANGE UP (ref 80–100)
MONOCYTES # BLD AUTO: 0.59 K/UL — SIGNIFICANT CHANGE UP (ref 0–0.9)
MONOCYTES NFR BLD AUTO: 8.6 % — SIGNIFICANT CHANGE UP (ref 2–14)
NEUTROPHILS # BLD AUTO: 4.64 K/UL — SIGNIFICANT CHANGE UP (ref 1.8–7.4)
NEUTROPHILS NFR BLD AUTO: 67.6 % — SIGNIFICANT CHANGE UP (ref 43–77)
NRBC # BLD: 0 /100 WBCS — SIGNIFICANT CHANGE UP (ref 0–0)
PHOSPHATE SERPL-MCNC: 4.4 MG/DL — SIGNIFICANT CHANGE UP (ref 2.5–4.5)
PLATELET # BLD AUTO: 197 K/UL — SIGNIFICANT CHANGE UP (ref 150–400)
POTASSIUM SERPL-MCNC: 3.6 MMOL/L — SIGNIFICANT CHANGE UP (ref 3.5–5.3)
POTASSIUM SERPL-SCNC: 3.6 MMOL/L — SIGNIFICANT CHANGE UP (ref 3.5–5.3)
PROT SERPL-MCNC: 6 G/DL — SIGNIFICANT CHANGE UP (ref 6–8.3)
RBC # BLD: 2.35 M/UL — LOW (ref 3.8–5.2)
RBC # FLD: 17 % — HIGH (ref 10.3–14.5)
SODIUM SERPL-SCNC: 139 MMOL/L — SIGNIFICANT CHANGE UP (ref 135–145)
WBC # BLD: 6.86 K/UL — SIGNIFICANT CHANGE UP (ref 3.8–10.5)
WBC # FLD AUTO: 6.86 K/UL — SIGNIFICANT CHANGE UP (ref 3.8–10.5)

## 2019-03-13 PROCEDURE — 99232 SBSQ HOSP IP/OBS MODERATE 35: CPT

## 2019-03-13 RX ORDER — ELECTROLYTE SOLUTION,INJ
1 VIAL (ML) INTRAVENOUS
Qty: 0 | Refills: 0 | Status: DISCONTINUED | OUTPATIENT
Start: 2019-03-13 | End: 2019-03-13

## 2019-03-13 RX ADMIN — CHLORHEXIDINE GLUCONATE 1 APPLICATION(S): 213 SOLUTION TOPICAL at 07:15

## 2019-03-13 RX ADMIN — Medication 250 MILLIGRAM(S): at 17:08

## 2019-03-13 RX ADMIN — Medication 4: at 00:53

## 2019-03-13 RX ADMIN — ERTAPENEM SODIUM 120 MILLIGRAM(S): 1 INJECTION, POWDER, LYOPHILIZED, FOR SOLUTION INTRAMUSCULAR; INTRAVENOUS at 13:29

## 2019-03-13 RX ADMIN — Medication 250 MILLIGRAM(S): at 07:16

## 2019-03-13 RX ADMIN — ZINC OXIDE 1 APPLICATION(S): 200 OINTMENT TOPICAL at 07:17

## 2019-03-13 RX ADMIN — NYSTATIN CREAM 1 APPLICATION(S): 100000 CREAM TOPICAL at 07:17

## 2019-03-13 RX ADMIN — FAMOTIDINE 104 MILLIGRAM(S): 10 INJECTION INTRAVENOUS at 11:06

## 2019-03-13 RX ADMIN — Medication 2: at 07:12

## 2019-03-13 RX ADMIN — PANTOPRAZOLE SODIUM 40 MILLIGRAM(S): 20 TABLET, DELAYED RELEASE ORAL at 07:21

## 2019-03-13 RX ADMIN — NYSTATIN CREAM 1 APPLICATION(S): 100000 CREAM TOPICAL at 19:27

## 2019-03-13 RX ADMIN — ENOXAPARIN SODIUM 40 MILLIGRAM(S): 100 INJECTION SUBCUTANEOUS at 13:29

## 2019-03-13 RX ADMIN — Medication 1 EACH: at 17:40

## 2019-03-13 RX ADMIN — ZINC OXIDE 1 APPLICATION(S): 200 OINTMENT TOPICAL at 13:30

## 2019-03-13 RX ADMIN — ZINC OXIDE 1 APPLICATION(S): 200 OINTMENT TOPICAL at 23:03

## 2019-03-13 RX ADMIN — Medication 2: at 17:57

## 2019-03-13 RX ADMIN — FAMOTIDINE 104 MILLIGRAM(S): 10 INJECTION INTRAVENOUS at 19:27

## 2019-03-13 NOTE — PROGRESS NOTE ADULT - ASSESSMENT
60yo F HD19 w/ known stage IV endometrial adenocarcinoma s/p staging surgery '18 and 1 round of chemotherapy 2/19 admitted for management of symptomatic rectovaginal and enterovaginal fistulas, now with complex fistula associated bacteruria, hemodynamically stable and afebrile. Working on dispo plan. *fever 3/12, started on vanc and ertapenem per ID recs. afebrile today, plan for vanc trough in AM before 4th dose    1. ID: urine culture from 2/27 positive for ESBL E. coli, associated due to complex fistula, s/p treatement with bactrim DS BID on 3/1-3/8, repeat urine cx 3/6 w/ MRSA and ESBL proteus resistant to TMP-SMX  - reconsulted for fever 3/12, appreciate recs  - asked ID to comment on whether there is utility in re-culturing for ESBL and MRSA  - on vanc 1000mg BID and ertapenem 1000mg q24hrs  2. FEN/GI - NPO, IVH, replete electrolytes PRN, octreotide daily on TPN, PICC placed 2/27, electrolytes repleted via TPN, lipids qod  3. PAIN - overall well controlled, OPM PRN  4.  - s/p joseph, using primafit catheter which patient is doing well with; will use peripads when ambulating  -no current leaking of stool  -continue with nystatin powder and ointment for mons and labial irritation as well as zinc oxide  5. Endocrine- ISS while patient remains NPO, holding home metformin at this time  6. RESP-  Saturating well on room air, encouraged ISS  8. VTE prophylaxis - SCDs, ambulate as tolerated with PT, Lovenox 40qd, PT    9. PT recommending LITO, pt can't go on TPN, case mgmt looking into options for L[facility]O that accepts TPN  - PT consulted to walk patient, as patient needs to be ambulating daily and daily exercises   - looking into inpatient private PT  10. Palliative: palliative consulted, appreciate recommendations  - radiation oncology consulted, recommended biopsy to confirm recurrence before considering radiation therapy, however no longer considering RT given relative contraindications w/ fistula and better options available (?chemo)  - possible chemotherapy when functional status improves  - pt no longer desires Lexapro 10mg qhs

## 2019-03-13 NOTE — PROGRESS NOTE ADULT - SUBJECTIVE AND OBJECTIVE BOX
INTERVAL HPI/OVERNIGHT EVENTS:   CONSTITUTIONAL:  + fever and chills, feels well, good appetite  EYES:  Negative  blurry vision or double vision  CARDIOVASCULAR:  Negative for chest pain or palpitations  RESPIRATORY:  Negative for cough, wheezing, or SOB   GASTROINTESTINAL:  Negative for nausea, vomiting, diarrhea, constipation, or abdominal pain  GENITOURINARY:  Negative frequency, urgency or dysuria  NEUROLOGIC:  No headache, confusion, dizziness, lightheadedness      ANTIBIOTICS/RELEVANT: none    MEDICATIONS  (STANDING):  chlorhexidine 2% Cloths 1 Application(s) Topical <User Schedule>  dextrose 5%. 1000 milliLiter(s) (50 mL/Hr) IV Continuous <Continuous>  dextrose 50% Injectable 12.5 Gram(s) IV Push once  dextrose 50% Injectable 25 Gram(s) IV Push once  dextrose 50% Injectable 25 Gram(s) IV Push once  enoxaparin Injectable 40 milliGRAM(s) SubCutaneous daily  famotidine  IVPB 20 milliGRAM(s) IV Intermittent two times a day  insulin lispro (HumaLOG) corrective regimen sliding scale   SubCutaneous every 6 hours  lactated ringers. 1000 milliLiter(s) (100 mL/Hr) IV Continuous <Continuous>  nystatin Powder 1 Application(s) Topical two times a day  pantoprazole  Injectable 40 milliGRAM(s) IV Push every 24 hours  Parenteral Nutrition - Adult 1 Each (83 mL/Hr) TPN Continuous <Continuous>  zinc oxide 20% Ointment 1 Application(s) Topical three times a day    MEDICATIONS  (PRN):  acetaminophen   Tablet .. 650 milliGRAM(s) Oral every 6 hours PRN Temp greater or equal to 38C (100.4F), Mild Pain (1 - 3)  dextrose 40% Gel 15 Gram(s) Oral once PRN Blood Glucose LESS THAN 70 milliGRAM(s)/deciliter  glucagon  Injectable 1 milliGRAM(s) IntraMuscular once PRN Glucose LESS THAN 70 milligrams/deciliter  ibuprofen  Tablet. 600 milliGRAM(s) Oral every 6 hours PRN Moderate Pain (4 - 6)  sodium chloride 0.9% lock flush 10 milliLiter(s) IV Push every 1 hour PRN Pre/post blood products, medications, blood draw, and to maintain line patency        Vital Signs Last 24 Hrs  Vital Signs Last 24 Hrs  T(C): 36.8 (13 Mar 2019 09:21), Max: 38.2 (12 Mar 2019 13:50)  T(F): 98.2 (13 Mar 2019 09:21), Max: 100.7 (12 Mar 2019 13:50)  HR: 77 (13 Mar 2019 09:21) (75 - 97)  BP: 115/75 (13 Mar 2019 09:21) (115/75 - 164/93)  BP(mean): --  RR: 18 (13 Mar 2019 09:21) (15 - 18)  SpO2: 98% (13 Mar 2019 09:21) (94% - 98%)  03-11-19 @ 07:01  -  03-12-19 @ 07:00  --------------------------------------------------------  IN: 2199.6 mL / OUT: 1600 mL / NET: 599.6 mL      Physical Exam:  Constitutional: visibly shivering, sick appearing  Pulmonary: clear to auscultation bilaterally   Cardiovascular: Regular rate and rhythm   Abdomen: Soft, nontender, nondistended, no guarding, no rebound, ostomy in place with brown stool output   Extremities: no lower extremity edema or calve tenderness. SCDs in place       LABS:                        7.9    11.12 )-----------( 245      ( 12 Mar 2019 09:01 )             25.2     03-12    137  |  100  |  28<H>  ----------------------------<  135<H>  3.8   |  26  |  0.52    Ca    9.6      12 Mar 2019 09:00  Phos  4.0     03-12  Mg     1.6     03-12    TPro  6.6  /  Alb  3.2<L>  /  TBili  0.2  /  DBili  x   /  AST  15  /  ALT  13  /  AlkPhos  92  03-11    PT/INR - ( 11 Mar 2019 08:04 )   PT: 12.0 sec;   INR: 1.06          PTT - ( 11 Mar 2019 08:04 )  PTT:30.4 sec      MICROBIOLOGY: Reviewed    RADIOLOGY & ADDITIONAL STUDIES: Reviewed INTERVAL HPI/OVERNIGHT EVENTS:  No acute events. No complaints. Stable overnight. afebrile.  CONSTITUTIONAL:  + fever and chills, feels well, good appetite  EYES:  Negative  blurry vision or double vision  CARDIOVASCULAR:  Negative for chest pain or palpitations  RESPIRATORY:  Negative for cough, wheezing, or SOB   GASTROINTESTINAL:  Negative for nausea, vomiting, diarrhea, constipation, or abdominal pain  GENITOURINARY:  Negative frequency, urgency or dysuria  NEUROLOGIC:  No headache, confusion, dizziness, lightheadedness      MEDICATIONS  (STANDING):  chlorhexidine 2% Cloths 1 Application(s) Topical <User Schedule>  dextrose 5%. 1000 milliLiter(s) (50 mL/Hr) IV Continuous <Continuous>  dextrose 50% Injectable 12.5 Gram(s) IV Push once  dextrose 50% Injectable 25 Gram(s) IV Push once  dextrose 50% Injectable 25 Gram(s) IV Push once  enoxaparin Injectable 40 milliGRAM(s) SubCutaneous daily  ertapenem  IVPB 1000 milliGRAM(s) IV Intermittent every 24 hours  ertapenem  IVPB      famotidine  IVPB 20 milliGRAM(s) IV Intermittent two times a day  insulin lispro (HumaLOG) corrective regimen sliding scale   SubCutaneous every 6 hours  lactated ringers. 1000 milliLiter(s) (100 mL/Hr) IV Continuous <Continuous>  nystatin Powder 1 Application(s) Topical two times a day  pantoprazole  Injectable 40 milliGRAM(s) IV Push every 24 hours  Parenteral Nutrition - Adult 1 Each (83 mL/Hr) TPN Continuous <Continuous>  Parenteral Nutrition - Adult 1 Each (83 mL/Hr) TPN Continuous <Continuous>  vancomycin  IVPB 1000 milliGRAM(s) IV Intermittent every 12 hours  zinc oxide 20% Ointment 1 Application(s) Topical three times a day    MEDICATIONS  (PRN):  acetaminophen   Tablet .. 650 milliGRAM(s) Oral every 6 hours PRN Temp greater or equal to 38C (100.4F), Mild Pain (1 - 3)  dextrose 40% Gel 15 Gram(s) Oral once PRN Blood Glucose LESS THAN 70 milliGRAM(s)/deciliter  glucagon  Injectable 1 milliGRAM(s) IntraMuscular once PRN Glucose LESS THAN 70 milligrams/deciliter  sodium chloride 0.9% lock flush 10 milliLiter(s) IV Push every 1 hour PRN Pre/post blood products, medications, blood draw, and to maintain line patency    Vital Signs Last 24 Hrs  T(C): 36.1 (13 Mar 2019 20:50), Max: 37.2 (13 Mar 2019 05:33)  T(F): 96.9 (13 Mar 2019 20:50), Max: 99 (13 Mar 2019 05:33)  HR: 67 (13 Mar 2019 20:50) (67 - 77)  BP: 147/97 (13 Mar 2019 20:50) (115/75 - 147/97)  BP(mean): --  RR: 17 (13 Mar 2019 20:50) (17 - 18)  SpO2: 98% (13 Mar 2019 20:50) (97% - 100%)  03-11-19 @ 07:01  -  03-12-19 @ 07:00  --------------------------------------------------------  IN: 2199.6 mL / OUT: 1600 mL / NET: 599.6 mL      Physical Exam:  Constitutional: NAD  Pulmonary: clear to auscultation bilaterally   Cardiovascular: Regular rate and rhythm   Abdomen: Soft, nontender, nondistended, no guarding, no rebound, ostomy in place with brown stool output   Extremities: no lower extremity edema or calve tenderness. SCDs in place     LABS:                         7.1    6.86  )-----------( 197      ( 13 Mar 2019 10:13 )             22.6     03-13    139  |  101  |  21  ----------------------------<  163<H>  3.6   |  28  |  0.54    Ca    9.2      13 Mar 2019 10:10  Phos  4.4     03-13  Mg     1.7     03-13    TPro  6.0  /  Alb  2.9<L>  /  TBili  0.2  /  DBili  x   /  AST  23  /  ALT  22  /  AlkPhos  105  03-13                        MICROBIOLOGY: Reviewed    RADIOLOGY & ADDITIONAL STUDIES: Reviewed

## 2019-03-13 NOTE — PROGRESS NOTE ADULT - SUBJECTIVE AND OBJECTIVE BOX
GYN PROGRESS NOTE PGY4    Patient evaluated at the bedside. No acute events. No complaints. Stable overnight. afebrile.    T(C): 37.2 (03-13-19 @ 05:33), Max: 38.2 (03-12-19 @ 13:50)  HR: 75 (03-13-19 @ 05:33) (75 - 97)  BP: 120/75 (03-13-19 @ 05:33) (120/74 - 164/93)  RR: 17 (03-13-19 @ 05:33) (15 - 17)  SpO2: 97% (03-13-19 @ 05:33) (94% - 99%)    GEN: patient appears well  LUNGS: no respiratory distress  ABD: soft, nt, nd, ostomy w output  EXT: no calf tenderness, scds; picc site no sign of infection        03-11 @ 07:01  -  03-12 @ 07:00  --------------------------------------------------------  IN: 2199.6 mL / OUT: 1600 mL / NET: 599.6 mL    03-12 @ 07:01  -  03-13 @ 06:58  --------------------------------------------------------  IN: 3991.3 mL / OUT: 1100 mL / NET: 2891.3 mL      MEDICATIONS  (STANDING):  chlorhexidine 2% Cloths 1 Application(s) Topical <User Schedule>  dextrose 5%. 1000 milliLiter(s) (50 mL/Hr) IV Continuous <Continuous>  dextrose 50% Injectable 12.5 Gram(s) IV Push once  dextrose 50% Injectable 25 Gram(s) IV Push once  dextrose 50% Injectable 25 Gram(s) IV Push once  enoxaparin Injectable 40 milliGRAM(s) SubCutaneous daily  ertapenem  IVPB 1000 milliGRAM(s) IV Intermittent every 24 hours  ertapenem  IVPB      famotidine  IVPB 20 milliGRAM(s) IV Intermittent two times a day  insulin lispro (HumaLOG) corrective regimen sliding scale   SubCutaneous every 6 hours  lactated ringers. 1000 milliLiter(s) (100 mL/Hr) IV Continuous <Continuous>  nystatin Powder 1 Application(s) Topical two times a day  pantoprazole  Injectable 40 milliGRAM(s) IV Push every 24 hours  Parenteral Nutrition - Adult 1 Each (83 mL/Hr) TPN Continuous <Continuous>  vancomycin  IVPB 1000 milliGRAM(s) IV Intermittent every 12 hours  zinc oxide 20% Ointment 1 Application(s) Topical three times a day    MEDICATIONS  (PRN):  acetaminophen   Tablet .. 650 milliGRAM(s) Oral every 6 hours PRN Temp greater or equal to 38C (100.4F), Mild Pain (1 - 3)  dextrose 40% Gel 15 Gram(s) Oral once PRN Blood Glucose LESS THAN 70 milliGRAM(s)/deciliter  glucagon  Injectable 1 milliGRAM(s) IntraMuscular once PRN Glucose LESS THAN 70 milligrams/deciliter  sodium chloride 0.9% lock flush 10 milliLiter(s) IV Push every 1 hour PRN Pre/post blood products, medications, blood draw, and to maintain line patency

## 2019-03-13 NOTE — PROGRESS NOTE ADULT - ASSESSMENT
60yo F HD19 w/ known stage IV endometrial adenocarcinoma s/p staging surgery '18 and 1 round of chemotherapy 2/19 admitted for management of symptomatic rectovaginal and enterovaginal fistulas, now with complex fistula associated bacteruria, hemodynamically stable and afebrile. Working on dispo plan. *fever 3/12, started on vanc and ertapenem per ID recs. currently afebrile.    1. ID: urine culture from 2/27 positive for ESBL E. coli, associated due to complex fistula, s/p treatement with bactrim DS BID on 3/1-3/8, repeat urine cx 3/6 w/ MRSA and ESBL proteus resistant to TMP-SMX   -ID consulted, recommended discontinuing antibiotics following fever curve and WBC, remove joseph and possible evaluation for vesicoenteric fistula, states no antibiotics needed in asymptomatic bacteriuria patients that are chronically catheterized  - reconsulted for fever 3/12, appreciate recs  2. FEN/GI - NPO, IVH, replete electrolytes PRN, octreotide daily on TPN, PICC placed 2/27, electrolytes repleted via TPN, lipids qod  3. PAIN - overall well controlled, tylenol, motrin PRN (gave toradol x 1 3/11 with relief)  4.  - s/p joseph, using primafit catheter which patient is doing well with; will use peripads when ambulating  -intermittent periods of leakage of scant amount of stool per vagina decreasing in frequency, general surgery consulted for recommendations on management of rectovaginal/enterovaginal fistulas, no surgical intervention at this time  -continue with nystatin powder and ointment for mons and labial irritation as well as zinc oxide  5. Endocrine- ISS while patient remains NPO, holding home metformin at this time  6. RESP-  Saturating well on room air, encouraged ISS  8. VTE prophylaxis - SCDs, ambulate as tolerated with PT, Lovenox 40qd, PT    9. PT recommending L[facility]O, pt can't go on TPN, case mgmt looking into options for L[facility] that accepts TPN  - PT consulted to walk patient, as patient needs to be ambulating daily and daily exercises   - looking into inpatient private PT  10. Palliative: palliative consulted, appreciate recommendations  - radiation oncology consulted, recommended biopsy to confirm recurrence before considering radiation therapy, however no longer considering RT given relative contraindications w/ fistula and better options available (?chemo)  - possible chemotherapy when functional status improves  - pt no longer desires Lexapro 10mg qhs

## 2019-03-13 NOTE — PROGRESS NOTE ADULT - SUBJECTIVE AND OBJECTIVE BOX
GYN PROGRESS NOTE PGY4    Patient evaluated at the bedside. No acute events. Afebrile. Stable. No complaints. Ambulated in the room with PT.  Signed TPN paperwork per social work's request for when patient is discharged. Staying in the hospital for now.    T(C): 36.3 (03-13-19 @ 18:06), Max: 37.3 (03-12-19 @ 21:19)  HR: 73 (03-13-19 @ 18:06) (72 - 80)  BP: 142/97 (03-13-19 @ 18:06) (115/75 - 142/97)  RR: 17 (03-13-19 @ 18:06) (16 - 18)  SpO2: 100% (03-13-19 @ 18:06) (97% - 100%)    GEN: patient appears well  LUNGS: no respiratory distress  ABD: soft, nt, nd, ostomy w output  EXT: no calf tenderness, scds; picc site no sign of infection    03-12 @ 07:01  -  03-13 @ 07:00  --------------------------------------------------------  IN: 3991.3 mL / OUT: 1100 mL / NET: 2891.3 mL    03-13 @ 07:01  -  03-13 @ 19:31  --------------------------------------------------------  IN: 0 mL / OUT: 0 mL / NET: 0 mL      MEDICATIONS  (STANDING):  chlorhexidine 2% Cloths 1 Application(s) Topical <User Schedule>  dextrose 5%. 1000 milliLiter(s) (50 mL/Hr) IV Continuous <Continuous>  dextrose 50% Injectable 12.5 Gram(s) IV Push once  dextrose 50% Injectable 25 Gram(s) IV Push once  dextrose 50% Injectable 25 Gram(s) IV Push once  enoxaparin Injectable 40 milliGRAM(s) SubCutaneous daily  ertapenem  IVPB 1000 milliGRAM(s) IV Intermittent every 24 hours  ertapenem  IVPB      famotidine  IVPB 20 milliGRAM(s) IV Intermittent two times a day  insulin lispro (HumaLOG) corrective regimen sliding scale   SubCutaneous every 6 hours  lactated ringers. 1000 milliLiter(s) (100 mL/Hr) IV Continuous <Continuous>  nystatin Powder 1 Application(s) Topical two times a day  pantoprazole  Injectable 40 milliGRAM(s) IV Push every 24 hours  Parenteral Nutrition - Adult 1 Each (83 mL/Hr) TPN Continuous <Continuous>  Parenteral Nutrition - Adult 1 Each (83 mL/Hr) TPN Continuous <Continuous>  vancomycin  IVPB 1000 milliGRAM(s) IV Intermittent every 12 hours  zinc oxide 20% Ointment 1 Application(s) Topical three times a day    MEDICATIONS  (PRN):  acetaminophen   Tablet .. 650 milliGRAM(s) Oral every 6 hours PRN Temp greater or equal to 38C (100.4F), Mild Pain (1 - 3)  dextrose 40% Gel 15 Gram(s) Oral once PRN Blood Glucose LESS THAN 70 milliGRAM(s)/deciliter  glucagon  Injectable 1 milliGRAM(s) IntraMuscular once PRN Glucose LESS THAN 70 milligrams/deciliter  sodium chloride 0.9% lock flush 10 milliLiter(s) IV Push every 1 hour PRN Pre/post blood products, medications, blood draw, and to maintain line patency

## 2019-03-13 NOTE — PROGRESS NOTE ADULT - ASSESSMENT
60 yo woman with Hx. 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 She noted abnormal drainage about 2 weeks ago and was admitted to [facility] transferred here for more specialized care. Upon arrival joseph catheter placed, with initial urine culture negative. Patient with two subsequent cultures growing ESBL and MRSA. Started on Bactrim for asymptomatic bacturia. Most recent cultures with Bactrim resistance. ID consulted for abx regimen given this culture data. D'cd abx given indwelling joseph. Joseph since d'cd 3/9 with new fever and elevated white count, patient with malaise.  Recommend:   - start vancomycin and ertapenem given cx data and now w/o joseph  - vanc trough before 4th dose    Will continue to follow 58 yo woman with Hx. 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 She noted abnormal drainage about 2 weeks ago and was admitted to [facility] transferred here for more specialized care. Upon arrival joseph catheter placed, with initial urine culture negative. Patient with two subsequent cultures growing ESBL and MRSA. Started on Bactrim for asymptomatic bacturia. Most recent cultures with Bactrim resistance. ID consulted for abx regimen given this culture data. D'cd abx given indwelling joseph. Joseph since d'cd 3/9 with new fever and elevated white count, patient with malaise.  Recommend:   - c/w vancomycin and ertapenem given cx data and now w/o joseph  - vanc trough before 4th dose    Will continue to follow 60 yo woman with Hx. 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 She noted abnormal drainage about 2 weeks ago and was admitted to [facility] transferred here for more specialized care. Upon arrival joseph catheter placed, with initial urine culture negative. Patient with two subsequent cultures growing ESBL and MRSA. Started on Bactrim for asymptomatic bacturia. Most recent cultures with Bactrim resistance. ID consulted for abx regimen given this culture data. D'cd abx given indwelling joseph. Joseph since d'cd 3/9 with new fever and elevated white count, patient with malaise.  Recommend:   - Would complete a 7-day course of vanco + ertapenem for Joseph-associated UTI.   - would not test for clearance of infection  - call epidemiology about need for a contact room in rehab    Please re-consult ID team 1 if further questions arise

## 2019-03-14 ENCOUNTER — TRANSCRIPTION ENCOUNTER (OUTPATIENT)
Age: 59
End: 2019-03-14

## 2019-03-14 LAB
ALBUMIN SERPL ELPH-MCNC: 2.7 G/DL — LOW (ref 3.3–5)
ALP SERPL-CCNC: 95 U/L — SIGNIFICANT CHANGE UP (ref 40–120)
ALT FLD-CCNC: 21 U/L — SIGNIFICANT CHANGE UP (ref 10–45)
ANION GAP SERPL CALC-SCNC: 10 MMOL/L — SIGNIFICANT CHANGE UP (ref 5–17)
ANION GAP SERPL CALC-SCNC: 10 MMOL/L — SIGNIFICANT CHANGE UP (ref 5–17)
AST SERPL-CCNC: 17 U/L — SIGNIFICANT CHANGE UP (ref 10–40)
BILIRUB SERPL-MCNC: 0.2 MG/DL — SIGNIFICANT CHANGE UP (ref 0.2–1.2)
BUN SERPL-MCNC: 17 MG/DL — SIGNIFICANT CHANGE UP (ref 7–23)
BUN SERPL-MCNC: 19 MG/DL — SIGNIFICANT CHANGE UP (ref 7–23)
CALCIUM SERPL-MCNC: 9.2 MG/DL — SIGNIFICANT CHANGE UP (ref 8.4–10.5)
CALCIUM SERPL-MCNC: 9.3 MG/DL — SIGNIFICANT CHANGE UP (ref 8.4–10.5)
CHLORIDE SERPL-SCNC: 101 MMOL/L — SIGNIFICANT CHANGE UP (ref 96–108)
CHLORIDE SERPL-SCNC: 102 MMOL/L — SIGNIFICANT CHANGE UP (ref 96–108)
CO2 SERPL-SCNC: 27 MMOL/L — SIGNIFICANT CHANGE UP (ref 22–31)
CO2 SERPL-SCNC: 28 MMOL/L — SIGNIFICANT CHANGE UP (ref 22–31)
CREAT SERPL-MCNC: 0.5 MG/DL — SIGNIFICANT CHANGE UP (ref 0.5–1.3)
CREAT SERPL-MCNC: 0.51 MG/DL — SIGNIFICANT CHANGE UP (ref 0.5–1.3)
GLUCOSE BLDC GLUCOMTR-MCNC: 116 MG/DL — HIGH (ref 70–99)
GLUCOSE BLDC GLUCOMTR-MCNC: 133 MG/DL — HIGH (ref 70–99)
GLUCOSE BLDC GLUCOMTR-MCNC: 149 MG/DL — HIGH (ref 70–99)
GLUCOSE BLDC GLUCOMTR-MCNC: 166 MG/DL — HIGH (ref 70–99)
GLUCOSE SERPL-MCNC: 144 MG/DL — HIGH (ref 70–99)
GLUCOSE SERPL-MCNC: 158 MG/DL — HIGH (ref 70–99)
HCT VFR BLD CALC: 23.5 % — LOW (ref 34.5–45)
HCT VFR BLD CALC: 24 % — LOW (ref 34.5–45)
HGB BLD-MCNC: 5.3 G/DL — CRITICAL LOW (ref 11.5–15.5)
HGB BLD-MCNC: 7.1 G/DL — LOW (ref 11.5–15.5)
MAGNESIUM SERPL-MCNC: 1.6 MG/DL — SIGNIFICANT CHANGE UP (ref 1.6–2.6)
MAGNESIUM SERPL-MCNC: 1.8 MG/DL — SIGNIFICANT CHANGE UP (ref 1.6–2.6)
MCHC RBC-ENTMCNC: 22.1 GM/DL — LOW (ref 32–36)
MCHC RBC-ENTMCNC: 29.2 PG — SIGNIFICANT CHANGE UP (ref 27–34)
MCHC RBC-ENTMCNC: 30.2 GM/DL — LOW (ref 32–36)
MCHC RBC-ENTMCNC: 30.3 PG — SIGNIFICANT CHANGE UP (ref 27–34)
MCV RBC AUTO: 137.1 FL — HIGH (ref 80–100)
MCV RBC AUTO: 96.7 FL — SIGNIFICANT CHANGE UP (ref 80–100)
NRBC # BLD: 0 /100 WBCS — SIGNIFICANT CHANGE UP (ref 0–0)
NRBC # BLD: 2 /100 WBCS — HIGH (ref 0–0)
PHOSPHATE SERPL-MCNC: 3.5 MG/DL — SIGNIFICANT CHANGE UP (ref 2.5–4.5)
PHOSPHATE SERPL-MCNC: 4 MG/DL — SIGNIFICANT CHANGE UP (ref 2.5–4.5)
PLATELET # BLD AUTO: 155 K/UL — SIGNIFICANT CHANGE UP (ref 150–400)
PLATELET # BLD AUTO: 185 K/UL — SIGNIFICANT CHANGE UP (ref 150–400)
POTASSIUM SERPL-MCNC: 3.1 MMOL/L — LOW (ref 3.5–5.3)
POTASSIUM SERPL-MCNC: 3.4 MMOL/L — LOW (ref 3.5–5.3)
POTASSIUM SERPL-SCNC: 3.1 MMOL/L — LOW (ref 3.5–5.3)
POTASSIUM SERPL-SCNC: 3.4 MMOL/L — LOW (ref 3.5–5.3)
PROT SERPL-MCNC: 6.3 G/DL — SIGNIFICANT CHANGE UP (ref 6–8.3)
RBC # BLD: 1.75 M/UL — LOW (ref 3.8–5.2)
RBC # BLD: 2.43 M/UL — LOW (ref 3.8–5.2)
RBC # FLD: 16.3 % — HIGH (ref 10.3–14.5)
RBC # FLD: 17.2 % — HIGH (ref 10.3–14.5)
SODIUM SERPL-SCNC: 139 MMOL/L — SIGNIFICANT CHANGE UP (ref 135–145)
SODIUM SERPL-SCNC: 139 MMOL/L — SIGNIFICANT CHANGE UP (ref 135–145)
VANCOMYCIN TROUGH SERPL-MCNC: 18.5 UG/ML — SIGNIFICANT CHANGE UP (ref 10–20)
WBC # BLD: 5.45 K/UL — SIGNIFICANT CHANGE UP (ref 3.8–10.5)
WBC # BLD: 6.59 K/UL — SIGNIFICANT CHANGE UP (ref 3.8–10.5)
WBC # FLD AUTO: 5.45 K/UL — SIGNIFICANT CHANGE UP (ref 3.8–10.5)
WBC # FLD AUTO: 6.59 K/UL — SIGNIFICANT CHANGE UP (ref 3.8–10.5)

## 2019-03-14 PROCEDURE — 99232 SBSQ HOSP IP/OBS MODERATE 35: CPT

## 2019-03-14 RX ORDER — ELECTROLYTE SOLUTION,INJ
1 VIAL (ML) INTRAVENOUS
Qty: 0 | Refills: 0 | Status: DISCONTINUED | OUTPATIENT
Start: 2019-03-14 | End: 2019-03-14

## 2019-03-14 RX ORDER — SODIUM FERRIC GLUCONAT/SUCROSE 62.5MG/5ML
125 AMPUL (ML) INTRAVENOUS ONCE
Qty: 0 | Refills: 0 | Status: COMPLETED | OUTPATIENT
Start: 2019-03-14 | End: 2019-03-14

## 2019-03-14 RX ORDER — I.V. FAT EMULSION 20 G/100ML
50 EMULSION INTRAVENOUS ONCE
Qty: 0 | Refills: 0 | Status: COMPLETED | OUTPATIENT
Start: 2019-03-14 | End: 2019-03-14

## 2019-03-14 RX ORDER — ACETAMINOPHEN 500 MG
1000 TABLET ORAL ONCE
Qty: 0 | Refills: 0 | Status: COMPLETED | OUTPATIENT
Start: 2019-03-14 | End: 2019-03-14

## 2019-03-14 RX ORDER — ALTEPLASE 100 MG
2 KIT INTRAVENOUS ONCE
Qty: 0 | Refills: 0 | Status: COMPLETED | OUTPATIENT
Start: 2019-03-14 | End: 2019-03-14

## 2019-03-14 RX ADMIN — CHLORHEXIDINE GLUCONATE 1 APPLICATION(S): 213 SOLUTION TOPICAL at 05:52

## 2019-03-14 RX ADMIN — SODIUM CHLORIDE 100 MILLILITER(S): 9 INJECTION, SOLUTION INTRAVENOUS at 17:57

## 2019-03-14 RX ADMIN — ALTEPLASE 2 MILLIGRAM(S): KIT at 12:15

## 2019-03-14 RX ADMIN — Medication 2: at 00:04

## 2019-03-14 RX ADMIN — Medication 110 MILLIGRAM(S): at 21:56

## 2019-03-14 RX ADMIN — Medication 250 MILLIGRAM(S): at 17:56

## 2019-03-14 RX ADMIN — Medication 2: at 21:57

## 2019-03-14 RX ADMIN — ENOXAPARIN SODIUM 40 MILLIGRAM(S): 100 INJECTION SUBCUTANEOUS at 12:25

## 2019-03-14 RX ADMIN — FAMOTIDINE 104 MILLIGRAM(S): 10 INJECTION INTRAVENOUS at 19:51

## 2019-03-14 RX ADMIN — FAMOTIDINE 104 MILLIGRAM(S): 10 INJECTION INTRAVENOUS at 05:51

## 2019-03-14 RX ADMIN — Medication 400 MILLIGRAM(S): at 23:23

## 2019-03-14 RX ADMIN — ERTAPENEM SODIUM 120 MILLIGRAM(S): 1 INJECTION, POWDER, LYOPHILIZED, FOR SOLUTION INTRAMUSCULAR; INTRAVENOUS at 12:25

## 2019-03-14 RX ADMIN — I.V. FAT EMULSION 20.83 GRAM(S): 20 EMULSION INTRAVENOUS at 21:48

## 2019-03-14 RX ADMIN — ZINC OXIDE 1 APPLICATION(S): 200 OINTMENT TOPICAL at 21:48

## 2019-03-14 RX ADMIN — Medication 1 EACH: at 17:56

## 2019-03-14 RX ADMIN — NYSTATIN CREAM 1 APPLICATION(S): 100000 CREAM TOPICAL at 17:57

## 2019-03-14 RX ADMIN — Medication 250 MILLIGRAM(S): at 07:43

## 2019-03-14 RX ADMIN — PANTOPRAZOLE SODIUM 40 MILLIGRAM(S): 20 TABLET, DELAYED RELEASE ORAL at 05:51

## 2019-03-14 RX ADMIN — Medication 1000 MILLIGRAM(S): at 23:38

## 2019-03-14 NOTE — PROGRESS NOTE ADULT - ASSESSMENT
58yo F HD20 w/known stage IV endometrial adenocarcinoma s/p staging surgery '18 and 1 round of chemotherapy 2/19 admitted for management of symptomatic rectovaginal and enterovaginal fistulas, now with complex fistula associated bacteruria, hemodynamically stable and afebrile. Working on dispo plan. *fever 3/12, started on vanc and ertapenem per ID recs on 3/12. Afebrile, vanc 18.5 this AM. Occlusion of PICC line now resolved s/p CathFlow.     1. ID: urine culture from 2/27 positive for ESBL E. coli, associated due to complex fistula, s/p treatement with bactrim DS BID on 3/1-3/8, repeat urine cx 3/6 w/ MRSA and ESBL proteus resistant to TMP-SMX  - Fever on 3/12-ID recommended IV Vancomycin 10o0mg BID and IV Ertapenem 1g qd , appreciate recs  Vanc trough 18.5 this AM   2. FEN/GI - NPO, IVH, replete electrolytes PRN, octreotide daily on TPN (lipid w/TPN every other day), PICC placed 2/27-occlusion 3/14 AM now resolved after CathFlow.  3. PAIN - overall well controlled, OPM PRN  4.  - s/p joseph, using primafit catheter which patient is doing well with; will use peripads when ambulating  -no current leaking of stool  -continue with nystatin powder and ointment for mons and labial irritation as well as zinc oxide  5. Endocrine- ISS while patient remains NPO, holding home metformin at this time  6. RESP-  Saturating well on room air, encouraged ISS  8. VTE prophylaxis - SCDs, ambulate as tolerated with PT, Lovenox 40qd, PT    9. PT recommends LITO   - PT consulted to walk patient, as patient needs to be ambulating daily and daily exercises   - LITO placement in [place] in [place]- TPN will be provided here    10. Palliative: palliative consulted, appreciate recommendations  - radiation oncology consulted, recommended biopsy to confirm recurrence before considering radiation therapy, however no longer considering RT given relative contraindications w/ fistula  - possible chemotherapy when functional status improves  - pt no longer desires Lexapro 10mg qhs

## 2019-03-14 NOTE — DISCHARGE NOTE NURSING/CASE MANAGEMENT/SOCIAL WORK - NSDCDPATPORTLINK_GEN_ALL_CORE
You can access the [facility] Patient Portal, offered by [facility], by registering with the following website: [email or web address]

## 2019-03-14 NOTE — PROGRESS NOTE ADULT - SUBJECTIVE AND OBJECTIVE BOX
GYN PROGRESS NOTE PGY4    Patient evaluated at the bedside. No acute events. She was comfortable overnight. Afebrile.   Drawing labs from PICC line this AM for vanc trough. Pt doing well on vanc and ertapenem.   Has a slight dry cough this AM.    T(C): 35.7 (03-14-19 @ 05:29), Max: 36.8 (03-13-19 @ 09:21)  HR: 79 (03-14-19 @ 05:29) (67 - 79)  BP: 133/84 (03-14-19 @ 05:29) (115/75 - 147/97)  RR: 17 (03-14-19 @ 05:29) (17 - 18)  SpO2: 99% (03-14-19 @ 05:29) (98% - 100%)    GEN: patient appears well overall, tired  LUNGS: no respiratory distress, lungs cta b/l  ABD: non tender  EXT: no calf tenderness, hot pack on knee        03-12 @ 07:01  -  03-13 @ 07:00  --------------------------------------------------------  IN: 3991.3 mL / OUT: 1100 mL / NET: 2891.3 mL    03-13 @ 07:01  -  03-14 @ 06:27  --------------------------------------------------------  IN: 900 mL / OUT: 1300 mL / NET: -400 mL      MEDICATIONS  (STANDING):  chlorhexidine 2% Cloths 1 Application(s) Topical <User Schedule>  dextrose 5%. 1000 milliLiter(s) (50 mL/Hr) IV Continuous <Continuous>  dextrose 50% Injectable 12.5 Gram(s) IV Push once  dextrose 50% Injectable 25 Gram(s) IV Push once  dextrose 50% Injectable 25 Gram(s) IV Push once  enoxaparin Injectable 40 milliGRAM(s) SubCutaneous daily  ertapenem  IVPB 1000 milliGRAM(s) IV Intermittent every 24 hours  ertapenem  IVPB      famotidine  IVPB 20 milliGRAM(s) IV Intermittent two times a day  insulin lispro (HumaLOG) corrective regimen sliding scale   SubCutaneous every 6 hours  lactated ringers. 1000 milliLiter(s) (100 mL/Hr) IV Continuous <Continuous>  nystatin Powder 1 Application(s) Topical two times a day  pantoprazole  Injectable 40 milliGRAM(s) IV Push every 24 hours  Parenteral Nutrition - Adult 1 Each (83 mL/Hr) TPN Continuous <Continuous>  vancomycin  IVPB 1000 milliGRAM(s) IV Intermittent every 12 hours  zinc oxide 20% Ointment 1 Application(s) Topical three times a day    MEDICATIONS  (PRN):  acetaminophen   Tablet .. 650 milliGRAM(s) Oral every 6 hours PRN Temp greater or equal to 38C (100.4F), Mild Pain (1 - 3)  dextrose 40% Gel 15 Gram(s) Oral once PRN Blood Glucose LESS THAN 70 milliGRAM(s)/deciliter  glucagon  Injectable 1 milliGRAM(s) IntraMuscular once PRN Glucose LESS THAN 70 milligrams/deciliter  sodium chloride 0.9% lock flush 10 milliLiter(s) IV Push every 1 hour PRN Pre/post blood products, medications, blood draw, and to maintain line patency

## 2019-03-14 NOTE — PROGRESS NOTE ADULT - SUBJECTIVE AND OBJECTIVE BOX
GYN PROGRESS NOTE PGY4    Patient evaluated at the bedside. No acute events. Afebrile. Pain well controlled today. Main issue is her knees are hurting, does not want meds, using hot packs. PICC working.    T(C): 36.3 (03-14-19 @ 17:02), Max: 36.6 (03-14-19 @ 08:28)  HR: 73 (03-14-19 @ 17:02) (67 - 80)  BP: 148/73 (03-14-19 @ 17:02) (133/84 - 165/100)  RR: 17 (03-14-19 @ 17:02) (17 - 18)  SpO2: 100% (03-14-19 @ 17:02) (98% - 100%)    GEN: patient appears well  LUNGS: no respiratory distress        03-13 @ 07:01  -  03-14 @ 07:00  --------------------------------------------------------  IN: 900 mL / OUT: 1300 mL / NET: -400 mL      MEDICATIONS  (STANDING):  chlorhexidine 2% Cloths 1 Application(s) Topical <User Schedule>  dextrose 50% Injectable 25 Gram(s) IV Push once  enoxaparin Injectable 40 milliGRAM(s) SubCutaneous daily  ertapenem  IVPB 1000 milliGRAM(s) IV Intermittent every 24 hours  ertapenem  IVPB      famotidine  IVPB 20 milliGRAM(s) IV Intermittent two times a day  fat emulsion (Plant Based) 20% IVPB 50 Gram(s) IV Intermittent once  insulin lispro (HumaLOG) corrective regimen sliding scale   SubCutaneous every 6 hours  lactated ringers. 1000 milliLiter(s) (100 mL/Hr) IV Continuous <Continuous>  nystatin Powder 1 Application(s) Topical two times a day  pantoprazole  Injectable 40 milliGRAM(s) IV Push every 24 hours  Parenteral Nutrition - Adult 1 Each (83 mL/Hr) TPN Continuous <Continuous>  Parenteral Nutrition - Adult 1 Each (83 mL/Hr) TPN Continuous <Continuous>  sodium ferric gluconate complex IVPB 125 milliGRAM(s) IV Intermittent once  vancomycin  IVPB 1000 milliGRAM(s) IV Intermittent every 12 hours  zinc oxide 20% Ointment 1 Application(s) Topical three times a day    MEDICATIONS  (PRN):  acetaminophen   Tablet .. 650 milliGRAM(s) Oral every 6 hours PRN Temp greater or equal to 38C (100.4F), Mild Pain (1 - 3)  glucagon  Injectable 1 milliGRAM(s) IntraMuscular once PRN Glucose LESS THAN 70 milligrams/deciliter  sodium chloride 0.9% lock flush 10 milliLiter(s) IV Push every 1 hour PRN Pre/post blood products, medications, blood draw, and to maintain line patency                              7.1    6.59  )-----------( 185      ( 14 Mar 2019 17:36 )             23.5     03-14    139  |  101  |  17  ----------------------------<  144<H>  3.1<L>   |  28  |  0.50    Ca    9.2      14 Mar 2019 17:36  Phos  3.5     03-14  Mg     1.6     03-14    TPro  6.3  /  Alb  2.7<L>  /  TBili  0.2  /  DBili  x   /  AST  17  /  ALT  21  /  AlkPhos  95  03-14    Vancomycin Level, Trough: 18.5: Vancomycin trough levels should be rapidly reached and maintained at  15-20 ug/ml for life threatening MRSA  infections such as sepsis, endocarditis, osteomyelitis and pneumonia. A  first trough level should be drawn  before the 3rd or 4th dose.  Risk of renal toxicity is increased for levels >15 ug/ml, in patients on  other nephrotoxic drugs, who are  hemodynamically unstable, have unstable renal function, or are on  Vancomycin therapy for >14 days. Renal function with  creatinine levels should be monitored for those patients. ug/mL (03.14.19 @ 06:33)

## 2019-03-14 NOTE — DISCHARGE NOTE NURSING/CASE MANAGEMENT/SOCIAL WORK - NSDCCRNAME_GEN_ALL_CORE_FT
[facility] [address] [address], [facility] [id number]

## 2019-03-14 NOTE — PROGRESS NOTE ADULT - ASSESSMENT
PLAN  - clears tonight  - IV iron tonight  - CT abd /pelvis with PO and IV contrast in AM  - replete K in tomorrow's TPN  - continue IV abx for now, appreciate ID recommendations

## 2019-03-14 NOTE — PROGRESS NOTE ADULT - SUBJECTIVE AND OBJECTIVE BOX
Pt seen and examined at bedside. Pt states she feels well today and is motivated to get out of bed and ambulate. Pt states she has mild left sided abdominal pain and said she didn't take any pain medication today. Vaginal itching is improved today.    Pt denies fever, chills, chest pain, SOB, nausea, vomiting, lightheadedness, or dizziness.      T(F): 97.4 (03-14-19 @ 17:02), Max: 97.9 (03-14-19 @ 08:28)  HR: 73 (03-14-19 @ 17:02) (67 - 80)  BP: 148/73 (03-14-19 @ 17:02) (133/84 - 165/100)  RR: 17 (03-14-19 @ 17:02) (17 - 18)  SpO2: 100% (03-14-19 @ 17:02) (98% - 100%)  Wt(kg): --  I&O's Summary    13 Mar 2019 07:01  -  14 Mar 2019 07:00  --------------------------------------------------------  IN: 900 mL / OUT: 1300 mL / NET: -400 mL    MEDICATIONS  (STANDING):  chlorhexidine 2% Cloths 1 Application(s) Topical <User Schedule>  dextrose 5%. 1000 milliLiter(s) (50 mL/Hr) IV Continuous <Continuous>  dextrose 50% Injectable 12.5 Gram(s) IV Push once  dextrose 50% Injectable 25 Gram(s) IV Push once  dextrose 50% Injectable 25 Gram(s) IV Push once  enoxaparin Injectable 40 milliGRAM(s) SubCutaneous daily  ertapenem  IVPB 1000 milliGRAM(s) IV Intermittent every 24 hours  ertapenem  IVPB      famotidine  IVPB 20 milliGRAM(s) IV Intermittent two times a day  fat emulsion (Plant Based) 20% IVPB 50 Gram(s) IV Intermittent once  insulin lispro (HumaLOG) corrective regimen sliding scale   SubCutaneous every 6 hours  lactated ringers. 1000 milliLiter(s) (100 mL/Hr) IV Continuous <Continuous>  nystatin Powder 1 Application(s) Topical two times a day  pantoprazole  Injectable 40 milliGRAM(s) IV Push every 24 hours  Parenteral Nutrition - Adult 1 Each (83 mL/Hr) TPN Continuous <Continuous>  Parenteral Nutrition - Adult 1 Each (83 mL/Hr) TPN Continuous <Continuous>  vancomycin  IVPB 1000 milliGRAM(s) IV Intermittent every 12 hours  zinc oxide 20% Ointment 1 Application(s) Topical three times a day    MEDICATIONS  (PRN):  acetaminophen   Tablet .. 650 milliGRAM(s) Oral every 6 hours PRN Temp greater or equal to 38C (100.4F), Mild Pain (1 - 3)  dextrose 40% Gel 15 Gram(s) Oral once PRN Blood Glucose LESS THAN 70 milliGRAM(s)/deciliter  glucagon  Injectable 1 milliGRAM(s) IntraMuscular once PRN Glucose LESS THAN 70 milligrams/deciliter  sodium chloride 0.9% lock flush 10 milliLiter(s) IV Push every 1 hour PRN Pre/post blood products, medications, blood draw, and to maintain line patency    Physical Exam:  Constitutional: NAD  Pulmonary: clear to auscultation bilaterally   Cardiovascular: Regular rate and rhythm   Abdomen: Soft, nontender, nondistended, no guarding, no rebound, ostomy intact with brown stool output   Extremities: no lower extremity edema or calve tenderness. SCDs in place     LABS:                        5.3    5.45  )-----------( 155      ( 14 Mar 2019 06:33 )             24.0     03-14    139  |  102  |  19  ----------------------------<  158<H>  3.4<L>   |  27  |  0.51    Ca    9.3      14 Mar 2019 06:33  Phos  4.0     03-14  Mg     1.8     03-14    TPro  6.3  /  Alb  2.7<L>  /  TBili  0.2  /  DBili  x   /  AST  17  /  ALT  21  /  AlkPhos  95  03-14

## 2019-03-14 NOTE — PROVIDER CONTACT NOTE (CRITICAL VALUE NOTIFICATION) - SITUATION
Laboratory called and patients hgemoglobin is 5.3 and MCV is 137.1. Team called and spoke with Twyla. No intervention at this time. She will call the PICC line team and ask them to come unclog her picc line and send another specimen.

## 2019-03-14 NOTE — CHART NOTE - NSCHARTNOTEFT_GEN_A_CORE
Admitting Diagnosis:   Patient is a 59y old  Female who presents with a chief complaint of Rectovaginal and enterovaginal fistulas (06 Mar 2019 11:51)      PAST MEDICAL & SURGICAL HISTORY:      Current Nutrition Order: NPO  TPN via PICC:  230g Dex, 79g AA, 50g 20% lipids 4x/week (1598kcal, 1.65g pro/kg IBW, GIR 2.5g)    PO Intake: Good (%) [   ]  Fair (50-75%) [   ] Poor (<25%) [   ] N/A    GI Issues:   LLQ colostomy  no output today  Denies N/V     Pain:  No pain reported     Skin Integrity:  IAD at perirenal area  Colostomy in place      Labs:       139  |  102  |  19  ----------------------------<  158<H>  3.4<L>   |  27  |  0.51    Ca    9.3      14 Mar 2019 06:33  Phos  4.0       Mg     1.8         TPro  6.3  /  Alb  2.7<L>  /  TBili  0.2  /  DBili  x   /  AST  17  /  ALT  21  /  AlkPhos  95  -    CAPILLARY BLOOD GLUCOSE      POCT Blood Glucose.: 116 mg/dL (14 Mar 2019 12:39)  POCT Blood Glucose.: 133 mg/dL (14 Mar 2019 05:45)  POCT Blood Glucose.: 170 mg/dL (13 Mar 2019 23:56)  POCT Blood Glucose.: 151 mg/dL (13 Mar 2019 16:57)      Medications:  MEDICATIONS  (STANDING):  chlorhexidine 2% Cloths 1 Application(s) Topical <User Schedule>  dextrose 5%. 1000 milliLiter(s) (50 mL/Hr) IV Continuous <Continuous>  dextrose 50% Injectable 12.5 Gram(s) IV Push once  dextrose 50% Injectable 25 Gram(s) IV Push once  dextrose 50% Injectable 25 Gram(s) IV Push once  enoxaparin Injectable 40 milliGRAM(s) SubCutaneous daily  ertapenem  IVPB 1000 milliGRAM(s) IV Intermittent every 24 hours  ertapenem  IVPB      famotidine  IVPB 20 milliGRAM(s) IV Intermittent two times a day  fat emulsion (Plant Based) 20% IVPB 50 Gram(s) IV Intermittent once  insulin lispro (HumaLOG) corrective regimen sliding scale   SubCutaneous every 6 hours  lactated ringers. 1000 milliLiter(s) (100 mL/Hr) IV Continuous <Continuous>  nystatin Powder 1 Application(s) Topical two times a day  pantoprazole  Injectable 40 milliGRAM(s) IV Push every 24 hours  Parenteral Nutrition - Adult 1 Each (83 mL/Hr) TPN Continuous <Continuous>  Parenteral Nutrition - Adult 1 Each (83 mL/Hr) TPN Continuous <Continuous>  vancomycin  IVPB 1000 milliGRAM(s) IV Intermittent every 12 hours  zinc oxide 20% Ointment 1 Application(s) Topical three times a day    MEDICATIONS  (PRN):  acetaminophen   Tablet .. 650 milliGRAM(s) Oral every 6 hours PRN Temp greater or equal to 38C (100.4F), Mild Pain (1 - 3)  dextrose 40% Gel 15 Gram(s) Oral once PRN Blood Glucose LESS THAN 70 milliGRAM(s)/deciliter  glucagon  Injectable 1 milliGRAM(s) IntraMuscular once PRN Glucose LESS THAN 70 milligrams/deciliter  sodium chloride 0.9% lock flush 10 milliLiter(s) IV Push every 1 hour PRN Pre/post blood products, medications, blood draw, and to maintain line patency      Weight: 136lbs  Height: 5'1", IBW 105lbs+/-10%, %%, BMI 25.7  Daily     Weight Change:   Denies any recent wt changes. Good appetite PTA.  Please trend weights while on TPN     Estimated energy needs:   IBW used for calculations as pt >120% of IBW   Nutrient needs based on [facility] standards of care for maintenance in adults.   Needs adjusted 2/2 catabolic illness, chemo treatments. Fluids adjusted 2/2 colostomy.  1428-1666kcal/day (30-35kcal/kg)  57-67g pro/day (1.2-1.4g/kg)  1428-1666ml fluid/day (30-35ml/kg)    Subjective:   58yo F with stage IV endometrial adenocarcinoma s/p staging surgery ' and 1 round of chemotherapy 3 wks ago. Was planned to complete 2nd cycle of chemo today . Admitted for management of symptomatic rectovaginal and enterovaginal fistulas. Now +MRSA. Per surgery, no surgical intervention at this time. Ordered for octreotide. PICC line placed (). Pt currently ordered for  230g Dex, 79g AA, 50g 20% lipids 4x/week (1598kcal, 1.65g pro/kg IBW, GIR 2.5g). TPN not infusing- stopped since 6am  clogged PICC line. Team to unclog at somepoint today.  POCT B, 133, 170, 152mg/dL; lytes WNL except; T (3/10) 287mg/dL (3/6), 189mg/dL (3/4), 383mg/dL (3/3).  Please trend TG 2x/week and monitor need for 20g 20% lipids 4x/week vs 50g. Pt seen resting in bed. Remains NPO. Denies N/V. Feeling hungry- requested RN take new updated wt on pt to assess adequacy of energy intake. Pt remains understanding of current NPO status with advancement pending medical course. TPN order appropriate. Please obtain new wt to assess adequacy of feeds. RD to follow.     Previous Nutrition Diagnosis:  increased nutrient needs RT increased demand for kcal/pro AEB catabolic illness, s/p chemo treatment and possible fistula losses.   Active [ x  ]  Resolved [   ]    If resolved, new PES:     Goal:  Pt to consistently meet % of estimated needs via most appropriate route    Recommendations:  1) Continue w/ current PN order: 230g Dex, 67g AA, 50g 20% lipids 4x/week (1550kcal, 1.4g pro/kg IBW, GIR 2.5g).   Recommend checking TG 2x/week. Check Mg, Phos, K daily and POC BG Q6hrs. Trend daily weights. Fluids and lytes per MD discretion.   2)  If TG >400mg/dL, consider switching to 20g 20% lipids vs 50g 4x/week.  3) When PO is deemed medically feasible recommend advancing to Clear Liquid diet w/ EnsureClears TID (720kcal, 24g pro)  4) please obtain updated weight    Education:   pt understanding of meeting needs via TPN for time being.     Risk Level: High [ x  ] Moderate [   ] Low [   ].

## 2019-03-14 NOTE — PROGRESS NOTE ADULT - ASSESSMENT
60yo F HD20 w/ known stage IV endometrial adenocarcinoma s/p staging surgery '18 and 1 round of chemotherapy 2/19 admitted for management of symptomatic rectovaginal and enterovaginal fistulas, now with complex fistula associated bacteruria, hemodynamically stable and afebrile. Working on dispo plan. *fever 3/12, started on vanc and ertapenem per ID recs. afebrile, vanc trough drawn before the 4th dose this AM. PICC line now may be occluded- assessing.    1. ID: urine culture from 2/27 positive for ESBL E. coli, associated due to complex fistula, s/p treatement with bactrim DS BID on 3/1-3/8, repeat urine cx 3/6 w/ MRSA and ESBL proteus resistant to TMP-SMX  - reconsulted for fever 3/12, appreciate recs  - asked ID to comment on whether there is utility in re-culturing for ESBL and MRSA  - on vanc 1000mg BID and ertapenem 1000mg q24hrs  2. FEN/GI - NPO, IVH, replete electrolytes PRN, octreotide daily on TPN, PICC placed 2/27, electrolytes repleted via TPN, lipids qod  3. PAIN - overall well controlled, OPM PRN  4.  - s/p joseph, using primafit catheter which patient is doing well with; will use peripads when ambulating  -no current leaking of stool  -continue with nystatin powder and ointment for mons and labial irritation as well as zinc oxide  5. Endocrine- ISS while patient remains NPO, holding home metformin at this time  6. RESP-  Saturating well on room air, encouraged ISS  8. VTE prophylaxis - SCDs, ambulate as tolerated with PT, Lovenox 40qd, PT    9. PT recommending LITO, pt can't go on TPN, case mgmt looking into options for LITO that accepts TPN  - PT consulted to walk patient, as patient needs to be ambulating daily and daily exercises   - looking into inpatient private PT, pt currently declining  10. Palliative: palliative consulted, appreciate recommendations  - radiation oncology consulted, recommended biopsy to confirm recurrence before considering radiation therapy, however no longer considering RT given relative contraindications w/ fistula and better options available (?chemo)  - possible chemotherapy when functional status improves  - pt no longer desires Lexapro 10mg qhs

## 2019-03-14 NOTE — PROGRESS NOTE ADULT - SUBJECTIVE AND OBJECTIVE BOX
Patient seen at bedside for critical value of 5.3 hgb. Patient denies abdominal pain, dizziness, SOB.     Vital Signs Last 24 Hrs  T(C): 36.6 (14 Mar 2019 08:28), Max: 36.8 (13 Mar 2019 09:21)  T(F): 97.9 (14 Mar 2019 08:28), Max: 98.3 (13 Mar 2019 13:20)  HR: 80 (14 Mar 2019 08:28) (67 - 80)  BP: 134/84 (14 Mar 2019 08:28) (115/75 - 147/97)  BP(mean): --  RR: 18 (14 Mar 2019 08:28) (17 - 18)  SpO2: 99% (14 Mar 2019 08:28) (98% - 100%)    Gen: resting comfortably  Chest: normal work of breathing, RRR  Abdomen: soft, nontender, nondistended  : no bleeding                          5.3    5.45  )-----------( 155      ( 14 Mar 2019 06:33 )             24.0       No signs of acute bleeding, patient asymptomatic. PICC team to assess line and repeat CBC. Attending notified.

## 2019-03-15 LAB
ALBUMIN SERPL ELPH-MCNC: 3.2 G/DL — LOW (ref 3.3–5)
ALP SERPL-CCNC: 108 U/L — SIGNIFICANT CHANGE UP (ref 40–120)
ALT FLD-CCNC: 21 U/L — SIGNIFICANT CHANGE UP (ref 10–45)
ANION GAP SERPL CALC-SCNC: 10 MMOL/L — SIGNIFICANT CHANGE UP (ref 5–17)
AST SERPL-CCNC: 16 U/L — SIGNIFICANT CHANGE UP (ref 10–40)
BASOPHILS # BLD AUTO: 0.01 K/UL — SIGNIFICANT CHANGE UP (ref 0–0.2)
BASOPHILS NFR BLD AUTO: 0.2 % — SIGNIFICANT CHANGE UP (ref 0–2)
BILIRUB SERPL-MCNC: <0.2 MG/DL — SIGNIFICANT CHANGE UP (ref 0.2–1.2)
BUN SERPL-MCNC: 17 MG/DL — SIGNIFICANT CHANGE UP (ref 7–23)
CALCIUM SERPL-MCNC: 9.1 MG/DL — SIGNIFICANT CHANGE UP (ref 8.4–10.5)
CHLORIDE SERPL-SCNC: 101 MMOL/L — SIGNIFICANT CHANGE UP (ref 96–108)
CO2 SERPL-SCNC: 29 MMOL/L — SIGNIFICANT CHANGE UP (ref 22–31)
CREAT SERPL-MCNC: 0.47 MG/DL — LOW (ref 0.5–1.3)
EOSINOPHIL # BLD AUTO: 0.15 K/UL — SIGNIFICANT CHANGE UP (ref 0–0.5)
EOSINOPHIL NFR BLD AUTO: 3.1 % — SIGNIFICANT CHANGE UP (ref 0–6)
GLUCOSE BLDC GLUCOMTR-MCNC: 186 MG/DL — HIGH (ref 70–99)
GLUCOSE BLDC GLUCOMTR-MCNC: 215 MG/DL — HIGH (ref 70–99)
GLUCOSE BLDC GLUCOMTR-MCNC: 91 MG/DL — SIGNIFICANT CHANGE UP (ref 70–99)
GLUCOSE SERPL-MCNC: 146 MG/DL — HIGH (ref 70–99)
HCT VFR BLD CALC: 23 % — LOW (ref 34.5–45)
HGB BLD-MCNC: 7.1 G/DL — LOW (ref 11.5–15.5)
IMM GRANULOCYTES NFR BLD AUTO: 0.4 % — SIGNIFICANT CHANGE UP (ref 0–1.5)
LYMPHOCYTES # BLD AUTO: 1.06 K/UL — SIGNIFICANT CHANGE UP (ref 1–3.3)
LYMPHOCYTES # BLD AUTO: 21.8 % — SIGNIFICANT CHANGE UP (ref 13–44)
MAGNESIUM SERPL-MCNC: 1.8 MG/DL — SIGNIFICANT CHANGE UP (ref 1.6–2.6)
MCHC RBC-ENTMCNC: 29.8 PG — SIGNIFICANT CHANGE UP (ref 27–34)
MCHC RBC-ENTMCNC: 30.9 GM/DL — LOW (ref 32–36)
MCV RBC AUTO: 96.6 FL — SIGNIFICANT CHANGE UP (ref 80–100)
MONOCYTES # BLD AUTO: 0.49 K/UL — SIGNIFICANT CHANGE UP (ref 0–0.9)
MONOCYTES NFR BLD AUTO: 10.1 % — SIGNIFICANT CHANGE UP (ref 2–14)
NEUTROPHILS # BLD AUTO: 3.14 K/UL — SIGNIFICANT CHANGE UP (ref 1.8–7.4)
NEUTROPHILS NFR BLD AUTO: 64.4 % — SIGNIFICANT CHANGE UP (ref 43–77)
NRBC # BLD: 0 /100 WBCS — SIGNIFICANT CHANGE UP (ref 0–0)
PHOSPHATE SERPL-MCNC: 3.8 MG/DL — SIGNIFICANT CHANGE UP (ref 2.5–4.5)
PLATELET # BLD AUTO: 216 K/UL — SIGNIFICANT CHANGE UP (ref 150–400)
POTASSIUM SERPL-MCNC: 2.9 MMOL/L — CRITICAL LOW (ref 3.5–5.3)
POTASSIUM SERPL-SCNC: 2.9 MMOL/L — CRITICAL LOW (ref 3.5–5.3)
PROT SERPL-MCNC: 6.1 G/DL — SIGNIFICANT CHANGE UP (ref 6–8.3)
RBC # BLD: 2.38 M/UL — LOW (ref 3.8–5.2)
RBC # FLD: 16.6 % — HIGH (ref 10.3–14.5)
SODIUM SERPL-SCNC: 140 MMOL/L — SIGNIFICANT CHANGE UP (ref 135–145)
VANCOMYCIN TROUGH SERPL-MCNC: 26.8 UG/ML — CRITICAL HIGH (ref 10–20)
WBC # BLD: 4.87 K/UL — SIGNIFICANT CHANGE UP (ref 3.8–10.5)
WBC # FLD AUTO: 4.87 K/UL — SIGNIFICANT CHANGE UP (ref 3.8–10.5)

## 2019-03-15 PROCEDURE — 74177 CT ABD & PELVIS W/CONTRAST: CPT | Mod: 26

## 2019-03-15 PROCEDURE — 99232 SBSQ HOSP IP/OBS MODERATE 35: CPT

## 2019-03-15 RX ORDER — POTASSIUM CHLORIDE 20 MEQ
40 PACKET (EA) ORAL ONCE
Qty: 0 | Refills: 0 | Status: COMPLETED | OUTPATIENT
Start: 2019-03-15 | End: 2019-03-15

## 2019-03-15 RX ORDER — MAGNESIUM OXIDE 400 MG ORAL TABLET 241.3 MG
400 TABLET ORAL ONCE
Qty: 0 | Refills: 0 | Status: COMPLETED | OUTPATIENT
Start: 2019-03-15 | End: 2019-03-15

## 2019-03-15 RX ORDER — ACETAMINOPHEN 500 MG
1000 TABLET ORAL ONCE
Qty: 0 | Refills: 0 | Status: COMPLETED | OUTPATIENT
Start: 2019-03-15 | End: 2019-03-15

## 2019-03-15 RX ORDER — SODIUM FERRIC GLUCONAT/SUCROSE 62.5MG/5ML
125 AMPUL (ML) INTRAVENOUS ONCE
Qty: 0 | Refills: 0 | Status: COMPLETED | OUTPATIENT
Start: 2019-03-15 | End: 2019-03-15

## 2019-03-15 RX ORDER — ELECTROLYTE SOLUTION,INJ
1 VIAL (ML) INTRAVENOUS
Qty: 0 | Refills: 0 | Status: DISCONTINUED | OUTPATIENT
Start: 2019-03-15 | End: 2019-03-15

## 2019-03-15 RX ORDER — IOHEXOL 300 MG/ML
30 INJECTION, SOLUTION INTRAVENOUS ONCE
Qty: 0 | Refills: 0 | Status: COMPLETED | OUTPATIENT
Start: 2019-03-15 | End: 2019-03-15

## 2019-03-15 RX ADMIN — NYSTATIN CREAM 1 APPLICATION(S): 100000 CREAM TOPICAL at 18:36

## 2019-03-15 RX ADMIN — ERTAPENEM SODIUM 120 MILLIGRAM(S): 1 INJECTION, POWDER, LYOPHILIZED, FOR SOLUTION INTRAMUSCULAR; INTRAVENOUS at 12:17

## 2019-03-15 RX ADMIN — PANTOPRAZOLE SODIUM 40 MILLIGRAM(S): 20 TABLET, DELAYED RELEASE ORAL at 05:54

## 2019-03-15 RX ADMIN — Medication 1000 MILLIGRAM(S): at 07:31

## 2019-03-15 RX ADMIN — ZINC OXIDE 1 APPLICATION(S): 200 OINTMENT TOPICAL at 22:15

## 2019-03-15 RX ADMIN — Medication 110 MILLIGRAM(S): at 21:53

## 2019-03-15 RX ADMIN — Medication 1 EACH: at 18:37

## 2019-03-15 RX ADMIN — ZINC OXIDE 1 APPLICATION(S): 200 OINTMENT TOPICAL at 05:56

## 2019-03-15 RX ADMIN — MAGNESIUM OXIDE 400 MG ORAL TABLET 400 MILLIGRAM(S): 241.3 TABLET ORAL at 09:36

## 2019-03-15 RX ADMIN — IOHEXOL 30 MILLILITER(S): 300 INJECTION, SOLUTION INTRAVENOUS at 12:17

## 2019-03-15 RX ADMIN — ENOXAPARIN SODIUM 40 MILLIGRAM(S): 100 INJECTION SUBCUTANEOUS at 12:17

## 2019-03-15 RX ADMIN — CHLORHEXIDINE GLUCONATE 1 APPLICATION(S): 213 SOLUTION TOPICAL at 05:54

## 2019-03-15 RX ADMIN — ZINC OXIDE 1 APPLICATION(S): 200 OINTMENT TOPICAL at 14:12

## 2019-03-15 RX ADMIN — Medication 400 MILLIGRAM(S): at 07:16

## 2019-03-15 RX ADMIN — Medication 250 MILLIGRAM(S): at 09:36

## 2019-03-15 RX ADMIN — Medication 40 MILLIEQUIVALENT(S): at 09:35

## 2019-03-15 RX ADMIN — FAMOTIDINE 104 MILLIGRAM(S): 10 INJECTION INTRAVENOUS at 18:36

## 2019-03-15 RX ADMIN — FAMOTIDINE 104 MILLIGRAM(S): 10 INJECTION INTRAVENOUS at 05:55

## 2019-03-15 RX ADMIN — Medication 2: at 06:54

## 2019-03-15 RX ADMIN — Medication 4: at 12:16

## 2019-03-15 RX ADMIN — NYSTATIN CREAM 1 APPLICATION(S): 100000 CREAM TOPICAL at 05:56

## 2019-03-15 NOTE — PROGRESS NOTE ADULT - ASSESSMENT
PLAN  - CT scan today  - clears  - IV iron today  - labs qd  - per ID, continue IV vanc and ertapenem for 7 days total, then can repeat urine cx 72 hrs after that to re-assess  - PT  - monitor BPs

## 2019-03-15 NOTE — PROGRESS NOTE ADULT - ASSESSMENT
60yo F w. known stage IV endometrial adenocarcinoma s/p staging surgery '18 and 1 round of chemotherapy 2/19 admitted for management of symptomatic rectovaginal and enterovaginal fistulas, now with complex fistula associated bacteruria, hemodynamically stable and afebrile. Working on dispo plan. *fever 3/12, started on vanc and ertapenem per ID recs. afebrile, vanc trough drawn before the 4th dose yesterday AM but may be inaccurate. PICC line was occluded but now working. Repleting electrolytes today.    1. ID: urine culture from 2/27 positive for ESBL E. coli, associated due to complex fistula, s/p treatement with bactrim DS BID on 3/1-3/8, repeat urine cx 3/6 w/ MRSA and ESBL proteus resistant to TMP-SMX  - reconsulted for fever 3/12, recommend 7 day course of IV vanc and ertepenem, then no oral antibiotics needed, then wait 72 hours then re-culture urine to assess "contact" status  - on vanc 1000mg BID and ertapenem 1000mg q24hrs  2. FEN/GI - clears, IVH, replete electrolytes PRN, octreotide daily on TPN, PICC placed 2/27, electrolytes repleted via TPN, lipids qod  3. PAIN - overall well controlled, OPM and IV prn  4.  - s/p joseph, using primafit catheter which patient is doing well with; will use peripads when ambulating  -no current leaking of stool  -continue with nystatin powder and ointment for mons and labial irritation as well as zinc oxide  5. Endocrine- ISS while patient remains NPO, holding home metformin at this time  6. RESP-  Saturating well on room air, encouraged ISS  8. VTE prophylaxis - SCDs, ambulate as tolerated with PT, Lovenox 40qd, PT    9. PT recommending L[facility]O, pt can't go on TPN, case mgmt looking into options for [facility] that accepts TPN, pt accepted but currently the plan is not to discharge the patient yet, will reassess dispo status at a later time  - PT consulted to walk patient, as patient needs to be ambulating daily and daily exercises   - looked into inpatient private PT, pt currently declining  10. Palliative: palliative consulted, appreciate recommendations  - radiation oncology consulted, recommended biopsy to confirm recurrence before considering radiation therapy, however no longer considering RT given relative contraindications w/ fistula and better options available (?chemo)  - possible chemotherapy when functional status improves  - pt no longer desires Lexapro 10mg qhs     *plan for CT A/P IV and PO contrast today         Electronic Signatures:  Maya Puente)  (

## 2019-03-15 NOTE — PROGRESS NOTE ADULT - SUBJECTIVE AND OBJECTIVE BOX
GYN PROGRESS NOTE PGY4    Patient evaluated at the bedside. No acute events. Complaining of knee pain. No abdominal pain. Was hungry yesterday so she was advanced to clears. States the fistula is overall improving. She is having "a small amount of brown" per vagina but significantly less. Will get CT this AM.     T(C): 36.3 (03-15-19 @ 05:43), Max: 36.7 (03-14-19 @ 20:00)  HR: 77 (03-15-19 @ 05:43) (73 - 80)  BP: 137/83 (03-15-19 @ 05:43) (137/83 - 165/100)  RR: 18 (03-15-19 @ 05:43) (17 - 18)  SpO2: 96% (03-15-19 @ 05:43) (96% - 100%)    GEN: patient appears well, tired  LUNGS: no respiratory distress  ABD: soft, non tender, non distended, ostomy w/ output   EXT: no calf tenderness, no asymmetry        03-14 @ 07:01  -  03-15 @ 07:00  --------------------------------------------------------  IN: 0 mL / OUT: 1000 mL / NET: -1000 mL    MEDICATIONS  (STANDING):  chlorhexidine 2% Cloths 1 Application(s) Topical <User Schedule>  dextrose 50% Injectable 25 Gram(s) IV Push once  enoxaparin Injectable 40 milliGRAM(s) SubCutaneous daily  ertapenem  IVPB 1000 milliGRAM(s) IV Intermittent every 24 hours  ertapenem  IVPB      famotidine  IVPB 20 milliGRAM(s) IV Intermittent two times a day  insulin lispro (HumaLOG) corrective regimen sliding scale   SubCutaneous every 6 hours  iohexol 300 mG (iodine)/mL Oral Solution 30 milliLiter(s) Oral once  lactated ringers. 1000 milliLiter(s) (100 mL/Hr) IV Continuous <Continuous>  nystatin Powder 1 Application(s) Topical two times a day  pantoprazole  Injectable 40 milliGRAM(s) IV Push every 24 hours  Parenteral Nutrition - Adult 1 Each (83 mL/Hr) TPN Continuous <Continuous>  vancomycin  IVPB 1000 milliGRAM(s) IV Intermittent every 12 hours  zinc oxide 20% Ointment 1 Application(s) Topical three times a day    MEDICATIONS  (PRN):  acetaminophen   Tablet .. 650 milliGRAM(s) Oral every 6 hours PRN Temp greater or equal to 38C (100.4F), Mild Pain (1 - 3)  glucagon  Injectable 1 milliGRAM(s) IntraMuscular once PRN Glucose LESS THAN 70 milligrams/deciliter  sodium chloride 0.9% lock flush 10 milliLiter(s) IV Push every 1 hour PRN Pre/post blood products, medications, blood draw, and to maintain line patency                              7.1    4.87  )-----------( 216      ( 15 Mar 2019 07:23 )             23.0     03-15    140  |  101  |  17  ----------------------------<  146<H>  2.9<LL>   |  29  |  0.47<L>    Ca    9.1      15 Mar 2019 07:23  Phos  3.8     03-15  Mg     1.8     03-15    TPro  6.1  /  Alb  3.2<L>  /  TBili  <0.2  /  DBili  x   /  AST  16  /  ALT  21  /  AlkPhos  108  03-15

## 2019-03-15 NOTE — PROGRESS NOTE ADULT - SUBJECTIVE AND OBJECTIVE BOX
GYN PROGRESS NOTE PGY4    Patient evaluated at the bedside. No complaints. Tolerating clears. Drank the oral contrast (75%) and had some nausea/no vomiting. Awaiting CT scan.    T(C): 37 (03-15-19 @ 12:56), Max: 37 (03-15-19 @ 12:56)  HR: 71 (03-15-19 @ 12:56) (71 - 80)  BP: 163/93 (03-15-19 @ 14:23) (137/83 - 175/116)  RR: 17 (03-15-19 @ 12:56) (16 - 18)  SpO2: 99% (03-15-19 @ 12:56) (96% - 100%)    GEN: patient appears well  LUNGS: no respiratory distress        03-14 @ 07:01  -  03-15 @ 07:00  --------------------------------------------------------  IN: 0 mL / OUT: 1000 mL / NET: -1000 mL    03-15 @ 07:01  -  03-15 @ 15:35  --------------------------------------------------------  IN: 240 mL / OUT: 1050 mL / NET: -810 mL

## 2019-03-16 LAB
ANION GAP SERPL CALC-SCNC: 11 MMOL/L — SIGNIFICANT CHANGE UP (ref 5–17)
ANION GAP SERPL CALC-SCNC: 12 MMOL/L — SIGNIFICANT CHANGE UP (ref 5–17)
BASOPHILS # BLD AUTO: 0.01 K/UL — SIGNIFICANT CHANGE UP (ref 0–0.2)
BASOPHILS NFR BLD AUTO: 0.2 % — SIGNIFICANT CHANGE UP (ref 0–2)
BLD GP AB SCN SERPL QL: NEGATIVE — SIGNIFICANT CHANGE UP
BUN SERPL-MCNC: 15 MG/DL — SIGNIFICANT CHANGE UP (ref 7–23)
BUN SERPL-MCNC: 16 MG/DL — SIGNIFICANT CHANGE UP (ref 7–23)
CALCIUM SERPL-MCNC: 9 MG/DL — SIGNIFICANT CHANGE UP (ref 8.4–10.5)
CALCIUM SERPL-MCNC: 9.1 MG/DL — SIGNIFICANT CHANGE UP (ref 8.4–10.5)
CHLORIDE SERPL-SCNC: 103 MMOL/L — SIGNIFICANT CHANGE UP (ref 96–108)
CHLORIDE SERPL-SCNC: 104 MMOL/L — SIGNIFICANT CHANGE UP (ref 96–108)
CO2 SERPL-SCNC: 27 MMOL/L — SIGNIFICANT CHANGE UP (ref 22–31)
CO2 SERPL-SCNC: 29 MMOL/L — SIGNIFICANT CHANGE UP (ref 22–31)
CREAT SERPL-MCNC: 0.49 MG/DL — LOW (ref 0.5–1.3)
CREAT SERPL-MCNC: 0.52 MG/DL — SIGNIFICANT CHANGE UP (ref 0.5–1.3)
EOSINOPHIL # BLD AUTO: 0.09 K/UL — SIGNIFICANT CHANGE UP (ref 0–0.5)
EOSINOPHIL NFR BLD AUTO: 2 % — SIGNIFICANT CHANGE UP (ref 0–6)
EXTRA BLUE TOP TUBE: SIGNIFICANT CHANGE UP
GLUCOSE BLDC GLUCOMTR-MCNC: 150 MG/DL — HIGH (ref 70–99)
GLUCOSE BLDC GLUCOMTR-MCNC: 156 MG/DL — HIGH (ref 70–99)
GLUCOSE BLDC GLUCOMTR-MCNC: 181 MG/DL — HIGH (ref 70–99)
GLUCOSE BLDC GLUCOMTR-MCNC: 191 MG/DL — HIGH (ref 70–99)
GLUCOSE BLDC GLUCOMTR-MCNC: 193 MG/DL — HIGH (ref 70–99)
GLUCOSE SERPL-MCNC: 117 MG/DL — HIGH (ref 70–99)
GLUCOSE SERPL-MCNC: 93 MG/DL — SIGNIFICANT CHANGE UP (ref 70–99)
HCT VFR BLD CALC: 22 % — LOW (ref 34.5–45)
HCT VFR BLD CALC: 22.8 % — LOW (ref 34.5–45)
HGB BLD-MCNC: 6.9 G/DL — CRITICAL LOW (ref 11.5–15.5)
HGB BLD-MCNC: 7.3 G/DL — LOW (ref 11.5–15.5)
IMM GRANULOCYTES NFR BLD AUTO: 0.4 % — SIGNIFICANT CHANGE UP (ref 0–1.5)
LYMPHOCYTES # BLD AUTO: 1.27 K/UL — SIGNIFICANT CHANGE UP (ref 1–3.3)
LYMPHOCYTES # BLD AUTO: 27.7 % — SIGNIFICANT CHANGE UP (ref 13–44)
MAGNESIUM SERPL-MCNC: 1.6 MG/DL — SIGNIFICANT CHANGE UP (ref 1.6–2.6)
MAGNESIUM SERPL-MCNC: 1.7 MG/DL — SIGNIFICANT CHANGE UP (ref 1.6–2.6)
MCHC RBC-ENTMCNC: 30.3 PG — SIGNIFICANT CHANGE UP (ref 27–34)
MCHC RBC-ENTMCNC: 30.9 PG — SIGNIFICANT CHANGE UP (ref 27–34)
MCHC RBC-ENTMCNC: 31.4 GM/DL — LOW (ref 32–36)
MCHC RBC-ENTMCNC: 32 GM/DL — SIGNIFICANT CHANGE UP (ref 32–36)
MCV RBC AUTO: 96.5 FL — SIGNIFICANT CHANGE UP (ref 80–100)
MCV RBC AUTO: 96.6 FL — SIGNIFICANT CHANGE UP (ref 80–100)
MONOCYTES # BLD AUTO: 0.66 K/UL — SIGNIFICANT CHANGE UP (ref 0–0.9)
MONOCYTES NFR BLD AUTO: 14.4 % — HIGH (ref 2–14)
NEUTROPHILS # BLD AUTO: 2.54 K/UL — SIGNIFICANT CHANGE UP (ref 1.8–7.4)
NEUTROPHILS NFR BLD AUTO: 55.3 % — SIGNIFICANT CHANGE UP (ref 43–77)
NRBC # BLD: 0 /100 WBCS — SIGNIFICANT CHANGE UP (ref 0–0)
NRBC # BLD: 0 /100 WBCS — SIGNIFICANT CHANGE UP (ref 0–0)
PHOSPHATE SERPL-MCNC: 3.5 MG/DL — SIGNIFICANT CHANGE UP (ref 2.5–4.5)
PHOSPHATE SERPL-MCNC: 3.6 MG/DL — SIGNIFICANT CHANGE UP (ref 2.5–4.5)
PLATELET # BLD AUTO: 211 K/UL — SIGNIFICANT CHANGE UP (ref 150–400)
PLATELET # BLD AUTO: 217 K/UL — SIGNIFICANT CHANGE UP (ref 150–400)
POTASSIUM SERPL-MCNC: 3.2 MMOL/L — LOW (ref 3.5–5.3)
POTASSIUM SERPL-MCNC: 3.3 MMOL/L — LOW (ref 3.5–5.3)
POTASSIUM SERPL-SCNC: 3.2 MMOL/L — LOW (ref 3.5–5.3)
POTASSIUM SERPL-SCNC: 3.3 MMOL/L — LOW (ref 3.5–5.3)
RBC # BLD: 2.28 M/UL — LOW (ref 3.8–5.2)
RBC # BLD: 2.36 M/UL — LOW (ref 3.8–5.2)
RBC # FLD: 16.6 % — HIGH (ref 10.3–14.5)
RBC # FLD: 16.7 % — HIGH (ref 10.3–14.5)
RH IG SCN BLD-IMP: POSITIVE — SIGNIFICANT CHANGE UP
SODIUM SERPL-SCNC: 142 MMOL/L — SIGNIFICANT CHANGE UP (ref 135–145)
SODIUM SERPL-SCNC: 144 MMOL/L — SIGNIFICANT CHANGE UP (ref 135–145)
WBC # BLD: 4.59 K/UL — SIGNIFICANT CHANGE UP (ref 3.8–10.5)
WBC # BLD: 5.12 K/UL — SIGNIFICANT CHANGE UP (ref 3.8–10.5)
WBC # FLD AUTO: 4.59 K/UL — SIGNIFICANT CHANGE UP (ref 3.8–10.5)
WBC # FLD AUTO: 5.12 K/UL — SIGNIFICANT CHANGE UP (ref 3.8–10.5)

## 2019-03-16 PROCEDURE — 99232 SBSQ HOSP IP/OBS MODERATE 35: CPT

## 2019-03-16 RX ORDER — ANASTROZOLE 1 MG/1
1 TABLET ORAL DAILY
Qty: 0 | Refills: 0 | Status: DISCONTINUED | OUTPATIENT
Start: 2019-03-16 | End: 2019-03-25

## 2019-03-16 RX ORDER — VANCOMYCIN HCL 1 G
1000 VIAL (EA) INTRAVENOUS EVERY 24 HOURS
Qty: 0 | Refills: 0 | Status: COMPLETED | OUTPATIENT
Start: 2019-03-16 | End: 2019-03-18

## 2019-03-16 RX ORDER — SODIUM FERRIC GLUCONAT/SUCROSE 62.5MG/5ML
125 AMPUL (ML) INTRAVENOUS ONCE
Qty: 0 | Refills: 0 | Status: COMPLETED | OUTPATIENT
Start: 2019-03-16 | End: 2019-03-16

## 2019-03-16 RX ORDER — ELECTROLYTE SOLUTION,INJ
1 VIAL (ML) INTRAVENOUS
Qty: 0 | Refills: 0 | Status: DISCONTINUED | OUTPATIENT
Start: 2019-03-16 | End: 2019-03-16

## 2019-03-16 RX ORDER — I.V. FAT EMULSION 20 G/100ML
50 EMULSION INTRAVENOUS ONCE
Qty: 0 | Refills: 0 | Status: COMPLETED | OUTPATIENT
Start: 2019-03-16 | End: 2019-03-16

## 2019-03-16 RX ADMIN — SODIUM CHLORIDE 100 MILLILITER(S): 9 INJECTION, SOLUTION INTRAVENOUS at 11:49

## 2019-03-16 RX ADMIN — Medication 2: at 00:58

## 2019-03-16 RX ADMIN — Medication 2: at 17:23

## 2019-03-16 RX ADMIN — Medication 2: at 06:17

## 2019-03-16 RX ADMIN — CHLORHEXIDINE GLUCONATE 1 APPLICATION(S): 213 SOLUTION TOPICAL at 06:19

## 2019-03-16 RX ADMIN — FAMOTIDINE 104 MILLIGRAM(S): 10 INJECTION INTRAVENOUS at 06:18

## 2019-03-16 RX ADMIN — ZINC OXIDE 1 APPLICATION(S): 200 OINTMENT TOPICAL at 06:40

## 2019-03-16 RX ADMIN — ANASTROZOLE 1 MILLIGRAM(S): 1 TABLET ORAL at 13:49

## 2019-03-16 RX ADMIN — Medication 110 MILLIGRAM(S): at 21:28

## 2019-03-16 RX ADMIN — FAMOTIDINE 104 MILLIGRAM(S): 10 INJECTION INTRAVENOUS at 17:24

## 2019-03-16 RX ADMIN — I.V. FAT EMULSION 20.83 GRAM(S): 20 EMULSION INTRAVENOUS at 22:48

## 2019-03-16 RX ADMIN — Medication 250 MILLIGRAM(S): at 15:49

## 2019-03-16 RX ADMIN — NYSTATIN CREAM 1 APPLICATION(S): 100000 CREAM TOPICAL at 17:25

## 2019-03-16 RX ADMIN — ERTAPENEM SODIUM 120 MILLIGRAM(S): 1 INJECTION, POWDER, LYOPHILIZED, FOR SOLUTION INTRAMUSCULAR; INTRAVENOUS at 12:12

## 2019-03-16 RX ADMIN — PANTOPRAZOLE SODIUM 40 MILLIGRAM(S): 20 TABLET, DELAYED RELEASE ORAL at 06:19

## 2019-03-16 RX ADMIN — ENOXAPARIN SODIUM 40 MILLIGRAM(S): 100 INJECTION SUBCUTANEOUS at 11:46

## 2019-03-16 RX ADMIN — ZINC OXIDE 1 APPLICATION(S): 200 OINTMENT TOPICAL at 13:24

## 2019-03-16 RX ADMIN — Medication 2: at 23:59

## 2019-03-16 RX ADMIN — NYSTATIN CREAM 1 APPLICATION(S): 100000 CREAM TOPICAL at 06:39

## 2019-03-16 RX ADMIN — ZINC OXIDE 1 APPLICATION(S): 200 OINTMENT TOPICAL at 22:00

## 2019-03-16 NOTE — PROGRESS NOTE ADULT - SUBJECTIVE AND OBJECTIVE BOX
GYN Progress Note    Patient seen at bedside.  Pain controlled.  On TPN  Voiding with primafit, pt finds it a bit annoying. Has not ambulated as they "took my walker away."    Denies CP, palpitations, SOB, fever, chills, nausea, vomiting.    Vital Signs Last 24 Hrs  T(C): 35.6 (16 Mar 2019 08:45), Max: 37 (15 Mar 2019 12:56)  T(F): 96 (16 Mar 2019 08:45), Max: 98.6 (15 Mar 2019 12:56)  HR: 80 (16 Mar 2019 08:45) (70 - 80)  BP: 155/86 (16 Mar 2019 08:45) (138/85 - 175/116)  BP(mean): --  RR: 16 (16 Mar 2019 08:45) (16 - 17)  SpO2: 99% (16 Mar 2019 08:45) (95% - 100%)    Physical Exam:  Gen: No Acute Distress  Pulm: Normal work of breathing  GI: soft, non tender, nondistended, no rebound, no guarding.  Ext: SCDs in place, [facility]    I&O's Summary    15 Mar 2019 07:01  -  16 Mar 2019 07:00  --------------------------------------------------------  IN: 4982 mL / OUT: 1850 mL / NET: 3132 mL    16 Mar 2019 07:01  -  16 Mar 2019 09:27  --------------------------------------------------------  IN: 549 mL / OUT: 0 mL / NET: 549 mL      MEDICATIONS  (STANDING):  chlorhexidine 2% Cloths 1 Application(s) Topical <User Schedule>  dextrose 50% Injectable 25 Gram(s) IV Push once  enoxaparin Injectable 40 milliGRAM(s) SubCutaneous daily  ertapenem  IVPB 1000 milliGRAM(s) IV Intermittent every 24 hours  ertapenem  IVPB      famotidine  IVPB 20 milliGRAM(s) IV Intermittent two times a day  insulin lispro (HumaLOG) corrective regimen sliding scale   SubCutaneous every 6 hours  lactated ringers. 1000 milliLiter(s) (100 mL/Hr) IV Continuous <Continuous>  nystatin Powder 1 Application(s) Topical two times a day  pantoprazole  Injectable 40 milliGRAM(s) IV Push every 24 hours  Parenteral Nutrition - Adult 1 Each (83 mL/Hr) TPN Continuous <Continuous>  vancomycin  IVPB 1000 milliGRAM(s) IV Intermittent every 12 hours  zinc oxide 20% Ointment 1 Application(s) Topical three times a day    MEDICATIONS  (PRN):  acetaminophen   Tablet .. 650 milliGRAM(s) Oral every 6 hours PRN Temp greater or equal to 38C (100.4F), Mild Pain (1 - 3)  glucagon  Injectable 1 milliGRAM(s) IntraMuscular once PRN Glucose LESS THAN 70 milligrams/deciliter  sodium chloride 0.9% lock flush 10 milliLiter(s) IV Push every 1 hour PRN Pre/post blood products, medications, blood draw, and to maintain line patency      LABS:                        7.1    4.87  )-----------( 216      ( 15 Mar 2019 07:23 )             23.0     03-15    140  |  101  |  17  ----------------------------<  146<H>  2.9<LL>   |  29  |  0.47<L>    Ca    9.1      15 Mar 2019 07:23  Phos  3.8     03-15  Mg     1.8     03-15    TPro  6.1  /  Alb  3.2<L>  /  TBili  <0.2  /  DBili  x   /  AST  16  /  ALT  21  /  AlkPhos  108  03-15

## 2019-03-16 NOTE — PROVIDER CONTACT NOTE (CRITICAL VALUE NOTIFICATION) - ACTION/TREATMENT ORDERED:
TBD by the team; will continue to monitor
Will recheck blood work in the am. No further interventions at the time.

## 2019-03-16 NOTE — PROGRESS NOTE ADULT - ASSESSMENT
60yo F w. known stage IV endometrial adenocarcinoma s/p staging surgery '18 and 1 round of chemotherapy 2/19 admitted for management of symptomatic rectovaginal and enterovaginal fistulas, now with complex fistula associated bacteruria, hemodynamically stable and afebrile. Working on dispo plan. *fever 3/12, started on vanc and ertapenem per ID recs. afebrile, vanc trough drawn before the 4th dose yesterday AM but may be inaccurate. PICC line was occluded but now working. Repleting electrolytes today.    1. ID: urine culture from 2/27 positive for ESBL E. coli, associated due to complex fistula, s/p treatement with bactrim DS BID on 3/1-3/8, repeat urine cx 3/6 w/ MRSA and ESBL proteus resistant to TMP-SMX  - reconsulted for fever 3/12, recommend 7 day course of IV vanc and ertepenem, then no oral antibiotics needed, then wait 72 hours then re-culture urine to assess "contact" status  - on vanc 1000mg BID and ertapenem 1000mg q24hrs --> vanc trough 26.8, dose held yesterday evening, will contact ID this morning regarding possible dose adjustment  - CT abdomen/pelvis w/ IV and oral contrast performed, will f/u official read  2. FEN/GI - clears, IVH, replete electrolytes PRN, octreotide daily on TPN, PICC placed 2/27, electrolytes repleted via TPN, lipids qod  3. PAIN - overall well controlled, OPM and IV prn  4.  - s/p joseph, using primafit catheter which patient is doing well with; will use peripads when ambulating  -no current leaking of stool  -continue with nystatin powder and ointment for mons and labial irritation as well as zinc oxide  5. Endocrine- ISS while patient remains NPO, holding home metformin at this time  6. RESP-  Saturating well on room air, encouraged ISS  8. VTE prophylaxis - SCDs, ambulate as tolerated with PT, Lovenox 40qd, PT    9. PT recommending [facility], pt can't go on TPN, case mgmt looking into options for [facility] that accepts TPN, pt accepted but currently the plan is not to discharge the patient yet, will reassess dispo status at a later time  - PT consulted to walk patient, as patient needs to be ambulating daily and daily exercises   - looked into inpatient private PT, pt currently declining  10. Palliative: palliative consulted, appreciate recommendations  - radiation oncology consulted, recommended biopsy to confirm recurrence before considering radiation therapy, however no longer considering RT given relative contraindications w/ fistula and better options available (?chemo)  - possible chemotherapy when functional status improves  - pt no longer desires Lexapro 10mg qhs

## 2019-03-17 LAB
ANION GAP SERPL CALC-SCNC: 11 MMOL/L — SIGNIFICANT CHANGE UP (ref 5–17)
ANION GAP SERPL CALC-SCNC: 11 MMOL/L — SIGNIFICANT CHANGE UP (ref 5–17)
BASOPHILS # BLD AUTO: 0.02 K/UL — SIGNIFICANT CHANGE UP (ref 0–0.2)
BASOPHILS NFR BLD AUTO: 0.4 % — SIGNIFICANT CHANGE UP (ref 0–2)
BUN SERPL-MCNC: 13 MG/DL — SIGNIFICANT CHANGE UP (ref 7–23)
BUN SERPL-MCNC: 14 MG/DL — SIGNIFICANT CHANGE UP (ref 7–23)
CALCIUM SERPL-MCNC: 8.9 MG/DL — SIGNIFICANT CHANGE UP (ref 8.4–10.5)
CALCIUM SERPL-MCNC: 9 MG/DL — SIGNIFICANT CHANGE UP (ref 8.4–10.5)
CHLORIDE SERPL-SCNC: 101 MMOL/L — SIGNIFICANT CHANGE UP (ref 96–108)
CHLORIDE SERPL-SCNC: 104 MMOL/L — SIGNIFICANT CHANGE UP (ref 96–108)
CO2 SERPL-SCNC: 28 MMOL/L — SIGNIFICANT CHANGE UP (ref 22–31)
CO2 SERPL-SCNC: 28 MMOL/L — SIGNIFICANT CHANGE UP (ref 22–31)
CREAT SERPL-MCNC: 0.5 MG/DL — SIGNIFICANT CHANGE UP (ref 0.5–1.3)
CREAT SERPL-MCNC: 0.51 MG/DL — SIGNIFICANT CHANGE UP (ref 0.5–1.3)
CULTURE RESULTS: SIGNIFICANT CHANGE UP
EOSINOPHIL # BLD AUTO: 0.16 K/UL — SIGNIFICANT CHANGE UP (ref 0–0.5)
EOSINOPHIL NFR BLD AUTO: 3 % — SIGNIFICANT CHANGE UP (ref 0–6)
GLUCOSE BLDC GLUCOMTR-MCNC: 134 MG/DL — HIGH (ref 70–99)
GLUCOSE BLDC GLUCOMTR-MCNC: 152 MG/DL — HIGH (ref 70–99)
GLUCOSE BLDC GLUCOMTR-MCNC: 160 MG/DL — HIGH (ref 70–99)
GLUCOSE BLDC GLUCOMTR-MCNC: 175 MG/DL — HIGH (ref 70–99)
GLUCOSE SERPL-MCNC: 162 MG/DL — HIGH (ref 70–99)
GLUCOSE SERPL-MCNC: 210 MG/DL — HIGH (ref 70–99)
HCT VFR BLD CALC: 23.2 % — LOW (ref 34.5–45)
HGB BLD-MCNC: 7.3 G/DL — LOW (ref 11.5–15.5)
IMM GRANULOCYTES NFR BLD AUTO: 0.8 % — SIGNIFICANT CHANGE UP (ref 0–1.5)
LYMPHOCYTES # BLD AUTO: 1.19 K/UL — SIGNIFICANT CHANGE UP (ref 1–3.3)
LYMPHOCYTES # BLD AUTO: 22.6 % — SIGNIFICANT CHANGE UP (ref 13–44)
MAGNESIUM SERPL-MCNC: 1.6 MG/DL — SIGNIFICANT CHANGE UP (ref 1.6–2.6)
MAGNESIUM SERPL-MCNC: 1.6 MG/DL — SIGNIFICANT CHANGE UP (ref 1.6–2.6)
MCHC RBC-ENTMCNC: 30.8 PG — SIGNIFICANT CHANGE UP (ref 27–34)
MCHC RBC-ENTMCNC: 31.5 GM/DL — LOW (ref 32–36)
MCV RBC AUTO: 97.9 FL — SIGNIFICANT CHANGE UP (ref 80–100)
MONOCYTES # BLD AUTO: 0.58 K/UL — SIGNIFICANT CHANGE UP (ref 0–0.9)
MONOCYTES NFR BLD AUTO: 11 % — SIGNIFICANT CHANGE UP (ref 2–14)
NEUTROPHILS # BLD AUTO: 3.28 K/UL — SIGNIFICANT CHANGE UP (ref 1.8–7.4)
NEUTROPHILS NFR BLD AUTO: 62.2 % — SIGNIFICANT CHANGE UP (ref 43–77)
NRBC # BLD: 0 /100 WBCS — SIGNIFICANT CHANGE UP (ref 0–0)
PHOSPHATE SERPL-MCNC: 3.5 MG/DL — SIGNIFICANT CHANGE UP (ref 2.5–4.5)
PHOSPHATE SERPL-MCNC: 3.7 MG/DL — SIGNIFICANT CHANGE UP (ref 2.5–4.5)
PLATELET # BLD AUTO: 213 K/UL — SIGNIFICANT CHANGE UP (ref 150–400)
POTASSIUM SERPL-MCNC: 3 MMOL/L — LOW (ref 3.5–5.3)
POTASSIUM SERPL-MCNC: 3 MMOL/L — LOW (ref 3.5–5.3)
POTASSIUM SERPL-SCNC: 3 MMOL/L — LOW (ref 3.5–5.3)
POTASSIUM SERPL-SCNC: 3 MMOL/L — LOW (ref 3.5–5.3)
RBC # BLD: 2.37 M/UL — LOW (ref 3.8–5.2)
RBC # FLD: 16.7 % — HIGH (ref 10.3–14.5)
SODIUM SERPL-SCNC: 140 MMOL/L — SIGNIFICANT CHANGE UP (ref 135–145)
SODIUM SERPL-SCNC: 143 MMOL/L — SIGNIFICANT CHANGE UP (ref 135–145)
SPECIMEN SOURCE: SIGNIFICANT CHANGE UP
WBC # BLD: 5.27 K/UL — SIGNIFICANT CHANGE UP (ref 3.8–10.5)
WBC # FLD AUTO: 5.27 K/UL — SIGNIFICANT CHANGE UP (ref 3.8–10.5)

## 2019-03-17 PROCEDURE — 99233 SBSQ HOSP IP/OBS HIGH 50: CPT

## 2019-03-17 RX ORDER — ELECTROLYTE SOLUTION,INJ
1 VIAL (ML) INTRAVENOUS
Qty: 0 | Refills: 0 | Status: DISCONTINUED | OUTPATIENT
Start: 2019-03-17 | End: 2019-03-17

## 2019-03-17 RX ADMIN — Medication 2: at 12:38

## 2019-03-17 RX ADMIN — Medication 1 EACH: at 18:56

## 2019-03-17 RX ADMIN — CHLORHEXIDINE GLUCONATE 1 APPLICATION(S): 213 SOLUTION TOPICAL at 05:29

## 2019-03-17 RX ADMIN — FAMOTIDINE 104 MILLIGRAM(S): 10 INJECTION INTRAVENOUS at 17:58

## 2019-03-17 RX ADMIN — NYSTATIN CREAM 1 APPLICATION(S): 100000 CREAM TOPICAL at 05:29

## 2019-03-17 RX ADMIN — ANASTROZOLE 1 MILLIGRAM(S): 1 TABLET ORAL at 11:18

## 2019-03-17 RX ADMIN — ENOXAPARIN SODIUM 40 MILLIGRAM(S): 100 INJECTION SUBCUTANEOUS at 11:22

## 2019-03-17 RX ADMIN — ZINC OXIDE 1 APPLICATION(S): 200 OINTMENT TOPICAL at 22:13

## 2019-03-17 RX ADMIN — SODIUM CHLORIDE 100 MILLILITER(S): 9 INJECTION, SOLUTION INTRAVENOUS at 11:17

## 2019-03-17 RX ADMIN — Medication 2: at 06:50

## 2019-03-17 RX ADMIN — PANTOPRAZOLE SODIUM 40 MILLIGRAM(S): 20 TABLET, DELAYED RELEASE ORAL at 05:28

## 2019-03-17 RX ADMIN — Medication 250 MILLIGRAM(S): at 15:45

## 2019-03-17 RX ADMIN — ZINC OXIDE 1 APPLICATION(S): 200 OINTMENT TOPICAL at 14:01

## 2019-03-17 RX ADMIN — NYSTATIN CREAM 1 APPLICATION(S): 100000 CREAM TOPICAL at 18:59

## 2019-03-17 RX ADMIN — ZINC OXIDE 1 APPLICATION(S): 200 OINTMENT TOPICAL at 05:29

## 2019-03-17 RX ADMIN — Medication 2: at 18:57

## 2019-03-17 RX ADMIN — ERTAPENEM SODIUM 120 MILLIGRAM(S): 1 INJECTION, POWDER, LYOPHILIZED, FOR SOLUTION INTRAMUSCULAR; INTRAVENOUS at 12:36

## 2019-03-17 RX ADMIN — FAMOTIDINE 104 MILLIGRAM(S): 10 INJECTION INTRAVENOUS at 05:28

## 2019-03-17 NOTE — PROGRESS NOTE ADULT - ASSESSMENT
60yo F with stage IV endometrial adenocarcinoma s/p staging surgery '18 and 1 round of chemotherapy 2/19 admitted for management of symptomatic rectovaginal and enterovaginal fistulas, now with complex fistula associated bacteruria, hemodynamically stable and afebrile.     1. ID: urine culture from 2/27 positive for ESBL E. coli, associated due to complex fistula, s/p treatement with bactrim DS BID on 3/1-3/8, repeat urine cx 3/6 w/ MRSA and ESBL proteus resistant to TMP-SMX  - ID reconsulted for fever 3/12, recommend 7 day course of IV vanc and ertepenem, then no oral antibiotics needed, then wait 72 hours then re-culture urine to assess "contact" status.    - Continue Vancomycin 1g daily for duration of treatment; continue ertepenem   - CT abdomen/pelvis w/ IV and oral contrast performed, will f/u official read  2. FEN/GI - clears, IVH, replete electrolytes PRN, octreotide daily on TPN, PICC placed 2/27, electrolytes repleted via TPN, lipids every other day  3. PAIN - overall well controlled, OPM and IV prn  4.  - joseph placed given urinary retention; will consult urology on Monday 3/18  -no current leaking of stool  -continue with nystatin powder and ointment for mons and labial irritation as well as zinc oxide  - careful urine output to monitor for signs of post obstructive diuresis.  5. Endocrine- ISS while patient remains NPO, holding home metformin at this time  6. RESP-  Saturating well on room air, encouraged ISS  7. VTE prophylaxis - SCDs, ambulate as tolerated with PT, Lovenox 40qd, PT    8 Palliative: palliative consulted, appreciate recommendations  - pt no longer desires Lexapro 10mg qhs

## 2019-03-17 NOTE — PROGRESS NOTE ADULT - ASSESSMENT
58yo F with stage IV endometrial adenocarcinoma s/p staging surgery '18 and 1 round of chemotherapy 2/19 admitted for management of symptomatic rectovaginal and enterovaginal fistulas, now with complex fistula associated bacteruria, hemodynamically stable and afebrile.     1. ID: urine culture from 2/27 positive for ESBL E. coli, associated due to complex fistula, s/p treatement with bactrim DS BID on 3/1-3/8, repeat urine cx 3/6 w/ MRSA and ESBL proteus resistant to TMP-SMX  - ID reconsulted for fever 3/12, recommend 7 day course of IV vanc and ertepenem, then no oral antibiotics needed, then wait 72 hours then re-culture urine to assess "contact" status.   - Continue Vancomycin 1g daily for duration of treatment; continue ertepenem   - CT abdomen/pelvis w/ IV and oral contrast performed, will f/u official read  2. FEN/GI - clears, IVH, replete electrolytes PRN, octreotide daily on TPN, PICC placed 2/27, electrolytes repleted via TPN, lipids qod  3. PAIN - overall well controlled, OPM and IV prn  4.  - joseph placed given urinary retention; will consult urology on Monday 3/18  -no current leaking of stool  -continue with nystatin powder and ointment for mons and labial irritation as well as zinc oxide  - Strict voids   5. Endocrine- ISS while patient remains NPO, holding home metformin at this time  6. RESP-  Saturating well on room air, encouraged ISS  7. VTE prophylaxis - SCDs, ambulate as tolerated with PT, Lovenox 40qd, PT    8 Palliative: palliative consulted, appreciate recommendations  - pt no longer desires Lexapro 10mg qhs 60yo F with stage IV endometrial adenocarcinoma s/p staging surgery '18 and 1 round of chemotherapy 2/19 admitted for management of symptomatic rectovaginal and enterovaginal fistulas, now with complex fistula associated bacteruria, hemodynamically stable and afebrile.     1. ID: urine culture from 2/27 positive for ESBL E. coli, associated due to complex fistula, s/p treatement with bactrim DS BID on 3/1-3/8, repeat urine cx 3/6 w/ MRSA and ESBL proteus resistant to TMP-SMX  - ID reconsulted for fever 3/12, recommend 7 day course of IV vanc and ertepenem, then no oral antibiotics needed, then wait 72 hours then re-culture urine to assess "contact" status.   - Continue Vancomycin 1g daily for duration of treatment; continue ertepenem   - CT abdomen/pelvis w/ IV and oral contrast performed, will f/u official read  2. FEN/GI - clears, IVH, replete electrolytes PRN, octreotide daily on TPN, PICC placed 2/27, electrolytes repleted via TPN, lipids qod  3. PAIN - overall well controlled, OPM and IV prn  4.  - joseph placed given urinary retention; will consult urology on Monday 3/18  -no current leaking of stool  -continue with nystatin powder and ointment for mons and labial irritation as well as zinc oxide  - careful urine output to monitor for signs of post obstructive diuresis.  5. Endocrine- ISS while patient remains NPO, holding home metformin at this time  6. RESP-  Saturating well on room air, encouraged ISS  7. VTE prophylaxis - SCDs, ambulate as tolerated with PT, Lovenox 40qd, PT    8 Palliative: palliative consulted, appreciate recommendations  - pt no longer desires Lexapro 10mg qhs

## 2019-03-17 NOTE — PROGRESS NOTE ADULT - SUBJECTIVE AND OBJECTIVE BOX
GYN Progress Note    Patient seen at bedside.  Pain controlled.  Tolerating clears. On TPN  OOB briefly  [place] in place, not bothersome currently. No vaginal discharge.     Denies CP, palpitations, SOB, fever, chills, nausea, vomiting.    Vital Signs Last 24 Hrs  T(C): 36.6 (17 Mar 2019 14:24), Max: 37 (17 Mar 2019 09:24)  T(F): 97.9 (17 Mar 2019 14:24), Max: 98.6 (17 Mar 2019 09:24)  HR: 84 (17 Mar 2019 14:24) (78 - 84)  BP: 148/90 (17 Mar 2019 14:24) (130/84 - 150/97)  BP(mean): --  RR: 17 (17 Mar 2019 14:24) (17 - 18)  SpO2: 98% (17 Mar 2019 14:24) (95% - 98%)    Physical Exam:  Gen: No Acute Distress  Pulm: Normal work of breathing  GI: soft, nontender, nondistended, no rebound, no guarding. no ostomy output  Ext: SCDs in place, wnl    I&O's Summary    16 Mar 2019 07:01  -  17 Mar 2019 07:00  --------------------------------------------------------  IN: 4776.2 mL / OUT: 3550 mL / NET: 1226.2 mL    17 Mar 2019 07:01  -  17 Mar 2019 16:53  --------------------------------------------------------  IN: 600 mL / OUT: 4000 mL / NET: -3400 mL      MEDICATIONS  (STANDING):  anastrozole 1 milliGRAM(s) Oral daily  chlorhexidine 2% Cloths 1 Application(s) Topical <User Schedule>  dextrose 50% Injectable 25 Gram(s) IV Push once  enoxaparin Injectable 40 milliGRAM(s) SubCutaneous daily  ertapenem  IVPB 1000 milliGRAM(s) IV Intermittent every 24 hours  ertapenem  IVPB      famotidine  IVPB 20 milliGRAM(s) IV Intermittent two times a day  insulin lispro (HumaLOG) corrective regimen sliding scale   SubCutaneous every 6 hours  lactated ringers. 1000 milliLiter(s) (100 mL/Hr) IV Continuous <Continuous>  nystatin Powder 1 Application(s) Topical two times a day  pantoprazole  Injectable 40 milliGRAM(s) IV Push every 24 hours  Parenteral Nutrition - Adult 1 Each (83 mL/Hr) TPN Continuous <Continuous>  Parenteral Nutrition - Adult 1 Each (83 mL/Hr) TPN Continuous <Continuous>  vancomycin  IVPB 1000 milliGRAM(s) IV Intermittent every 24 hours  zinc oxide 20% Ointment 1 Application(s) Topical three times a day    MEDICATIONS  (PRN):  acetaminophen   Tablet .. 650 milliGRAM(s) Oral every 6 hours PRN Temp greater or equal to 38C (100.4F), Mild Pain (1 - 3)  glucagon  Injectable 1 milliGRAM(s) IntraMuscular once PRN Glucose LESS THAN 70 milligrams/deciliter  sodium chloride 0.9% lock flush 10 milliLiter(s) IV Push every 1 hour PRN Pre/post blood products, medications, blood draw, and to maintain line patency      LABS:                        7.3    5.27  )-----------( 213      ( 17 Mar 2019 09:25 )             23.2     03-17    140  |  101  |  14  ----------------------------<  210<H>  3.0<L>   |  28  |  0.50    Ca    9.0      17 Mar 2019 09:25  Phos  3.7     03-17  Mg     1.6     03-17

## 2019-03-17 NOTE — PROGRESS NOTE ADULT - ASSESSMENT
60yo F with stage IV endometrial adenocarcinoma s/p staging surgery '18 and 1 round of chemotherapy 2/19 admitted for management of symptomatic rectovaginal and enterovaginal fistulas, now with complex fistula associated bacteruria, hemodynamically stable and afebrile.     1. ID: urine culture from 2/27 positive for ESBL E. coli, associated due to complex fistula, s/p treatement with bactrim DS BID on 3/1-3/8, repeat urine cx 3/6 w/ MRSA and ESBL proteus resistant to TMP-SMX  - ID reconsulted for fever 3/12, recommend 7 day course of IV vanc and ertepenem, then no oral antibiotics needed, then wait 72 hours then re-culture urine to assess "contact" status.   - Given vanc trough of 26.8, dose held yesterday evening: recommend adjusting dose to 1 g daily for duration of treatment; continue ertepenem   - CT abdomen/pelvis w/ IV and oral contrast performed, will f/u official read  2. FEN/GI - clears, IVH, replete electrolytes PRN, octreotide daily on TPN, PICC placed 2/27, electrolytes repleted via TPN, lipids qod  3. PAIN - overall well controlled, OPM and IV prn  4.  - joseph placed given urinary retention; will consult urology on Monday 3/18  -no current leaking of stool  -continue with nystatin powder and ointment for mons and labial irritation as well as zinc oxide  5. Endocrine- ISS while patient remains NPO, holding home metformin at this time  6. RESP-  Saturating well on room air, encouraged ISS  7. VTE prophylaxis - SCDs, ambulate as tolerated with PT, Lovenox 40qd, PT    8 Palliative: palliative consulted, appreciate recommendations  - pt no longer desires Lexapro 10mg qhs

## 2019-03-17 NOTE — PROGRESS NOTE ADULT - SUBJECTIVE AND OBJECTIVE BOX
Pt seen and examined at bedside. She has no complaints.  Denies any pain; reports irritation in groin is significantly improved.  Tolerating clears, on TPN.  Patient out of bed to chair; in chair at time of evaluation.  Denies any leakage of stool from fistula today.      Denies SOB, chest pain, abdominal pain, nausea, vomiting.      T(F): 99.6 (03-17-19 @ 17:17), Max: 99.6 (03-17-19 @ 17:17)  HR: 78 (03-17-19 @ 17:17) (78 - 84)  BP: 159/108 (03-17-19 @ 17:17) (130/84 - 159/108)  RR: 19 (03-17-19 @ 17:17) (17 - 19)  SpO2: 100% (03-17-19 @ 17:17) (95% - 100%)  Wt(kg): --  I&O's Summary    16 Mar 2019 07:01  -  17 Mar 2019 07:00  --------------------------------------------------------  IN: 4776.2 mL / OUT: 3550 mL / NET: 1226.2 mL    17 Mar 2019 07:01  -  17 Mar 2019 22:16  --------------------------------------------------------  IN: 1724.4 mL / OUT: 4000 mL / NET: -2275.6 mL        MEDICATIONS  (STANDING):  anastrozole 1 milliGRAM(s) Oral daily  chlorhexidine 2% Cloths 1 Application(s) Topical <User Schedule>  dextrose 50% Injectable 25 Gram(s) IV Push once  enoxaparin Injectable 40 milliGRAM(s) SubCutaneous daily  ertapenem  IVPB 1000 milliGRAM(s) IV Intermittent every 24 hours  ertapenem  IVPB      famotidine  IVPB 20 milliGRAM(s) IV Intermittent two times a day  insulin lispro (HumaLOG) corrective regimen sliding scale   SubCutaneous every 6 hours  nystatin Powder 1 Application(s) Topical two times a day  pantoprazole  Injectable 40 milliGRAM(s) IV Push every 24 hours  Parenteral Nutrition - Adult 1 Each (83 mL/Hr) TPN Continuous <Continuous>  Parenteral Nutrition - Adult 1 Each (83 mL/Hr) TPN Continuous <Continuous>  vancomycin  IVPB 1000 milliGRAM(s) IV Intermittent every 24 hours  zinc oxide 20% Ointment 1 Application(s) Topical three times a day    MEDICATIONS  (PRN):  acetaminophen   Tablet .. 650 milliGRAM(s) Oral every 6 hours PRN Temp greater or equal to 38C (100.4F), Mild Pain (1 - 3)  glucagon  Injectable 1 milliGRAM(s) IntraMuscular once PRN Glucose LESS THAN 70 milligrams/deciliter  sodium chloride 0.9% lock flush 10 milliLiter(s) IV Push every 1 hour PRN Pre/post blood products, medications, blood draw, and to maintain line patency      Physical Exam:  Gen: No Acute Distress, sitting up in chair  Pulm: Normal work of breathing  GI: soft, nontender, nondistended, no rebound, no guarding. no ostomy output  Ext: nontender to palpation, no edema or erythema    LABS:                        7.3    5.27  )-----------( 213      ( 17 Mar 2019 09:25 )             23.2     03-17    143  |  104  |  13  ----------------------------<  162<H>  3.0<L>   |  28  |  0.51    Ca    8.9      17 Mar 2019 18:11  Phos  3.5     03-17  Mg     1.6     03-17

## 2019-03-17 NOTE — PROGRESS NOTE ADULT - ASSESSMENT
60yo F with stage IV endometrial adenocarcinoma s/p staging surgery '18 and 1 round of chemotherapy 2/19 admitted for management of symptomatic rectovaginal and enterovaginal fistulas, now with complex fistula associated bacteruria, hemodynamically stable and afebrile.     1. ID: urine culture from 2/27 positive for ESBL E. coli, associated due to complex fistula, s/p treatement with bactrim DS BID on 3/1-3/8, repeat urine cx 3/6 w/ MRSA and ESBL proteus resistant to TMP-SMX  - ID reconsulted for fever 3/12, recommend 7 day course of IV vanc and ertepenem, then no oral antibiotics needed, then wait 72 hours then re-culture urine to assess "contact" status.    - Continue Vancomycin 1g daily for duration of treatment; continue ertepenem -- day 6/7.  - CT abdomen/pelvis w/ IV and oral contrast performed, will f/u official read  2. FEN/GI - clears, IVH, replete electrolytes PRN, octreotide daily on TPN, PICC placed 2/27, electrolytes repleted via TPN, lipids every other day  3. PAIN - overall well controlled, OPM and IV prn  4.  - joseph placed given urinary retention; will consult urology on Monday 3/18  -no current leaking of stool  -continue with nystatin powder and ointment for mons and labial irritation as well as zinc oxide  - careful urine output to monitor for signs of post obstructive diuresis.  5. Endocrine- ISS while patient remains NPO, holding home metformin at this time  6. RESP-  Saturating well on room air, encouraged ISS  7. VTE prophylaxis - SCDs, ambulate as tolerated with PT, Lovenox 40qd, PT    8 Palliative: palliative consulted, appreciate recommendations  - pt no longer desires Lexapro 10mg qhs

## 2019-03-17 NOTE — PROGRESS NOTE ADULT - SUBJECTIVE AND OBJECTIVE BOX
GYN PROGRESS NOTE    Patient evaluated at the bedside. No acute events. Patient reports right knee pain this morning but declines pain medication at this time. No other complaints. Patient denies leaking of stool contents, no irritation from joseph. Patient would like to rest now.     O:   T(C): 36.9 (03-17-19 @ 05:05), Max: 37.3 (03-16-19 @ 16:20)  HR: 81 (03-17-19 @ 05:05) (77 - 81)  BP: 138/85 (03-17-19 @ 05:05) (137/81 - 150/97)  RR: 18 (03-17-19 @ 05:05) (16 - 18)  SpO2: 95% (03-17-19 @ 05:05) (95% - 99%)  Wt(kg): --    GEN: patient appears comfortable  LUNGS: no respiratory distress  ABD: soft, nontender, not distended, ostomy with stool contents in bag   Pelvic: joseph catheter in place draining clear urine, no leaking of stool appreciated on chux pad   EXT: no calf tenderness, SCDs in place         03-16 @ 07:01  -  03-17 @ 07:00  --------------------------------------------------------  IN: 4776.2 mL / OUT: 3550 mL / NET: 1226.2 mL

## 2019-03-17 NOTE — PROGRESS NOTE ADULT - SUBJECTIVE AND OBJECTIVE BOX
GYN PROGRESS NOTE    Patient evaluated at the bedside. Interval events significant for bladder distension noted on CT scan. Patient denies pain at this time. She was been tolerating jello and sherbert. Denies CP/SOB/dizziness/nausea/vomiting/calf pain/leaking of stool contents. Patient agrees to joseph placement at this time.     O:   GEN: resting comfortably, watching TV   LUNGS: no respiratory distress  ABD: soft, nontender, ostomy with stool contents in bag  Pelvic: primafit cath in place   EXT: no calf tenderness, SCDs in place Delayed entry due to patient care: Patient evaluated at 12 pm 3/16     GYN PROGRESS NOTE    Patient evaluated at the bedside. Interval events significant for bladder distension noted on CT scan. Patient denies pain at this time. She was been tolerating jello and sherbert. Denies CP/SOB/dizziness/nausea/vomiting/calf pain/leaking of stool contents. Patient agrees to joseph placement at this time.     O:   GEN: resting comfortably, watching TV   LUNGS: no respiratory distress  ABD: soft, nontender, ostomy with stool contents in bag  Pelvic: primafit cath in place   EXT: no calf tenderness, SCDs in place

## 2019-03-18 LAB
ALBUMIN SERPL ELPH-MCNC: 3.1 G/DL — LOW (ref 3.3–5)
ALP SERPL-CCNC: 114 U/L — SIGNIFICANT CHANGE UP (ref 40–120)
ALT FLD-CCNC: 18 U/L — SIGNIFICANT CHANGE UP (ref 10–45)
ANION GAP SERPL CALC-SCNC: 11 MMOL/L — SIGNIFICANT CHANGE UP (ref 5–17)
AST SERPL-CCNC: 15 U/L — SIGNIFICANT CHANGE UP (ref 10–40)
BASOPHILS # BLD AUTO: 0.02 K/UL — SIGNIFICANT CHANGE UP (ref 0–0.2)
BASOPHILS NFR BLD AUTO: 0.4 % — SIGNIFICANT CHANGE UP (ref 0–2)
BILIRUB SERPL-MCNC: 0.2 MG/DL — SIGNIFICANT CHANGE UP (ref 0.2–1.2)
BUN SERPL-MCNC: 15 MG/DL — SIGNIFICANT CHANGE UP (ref 7–23)
CALCIUM SERPL-MCNC: 9.1 MG/DL — SIGNIFICANT CHANGE UP (ref 8.4–10.5)
CHLORIDE SERPL-SCNC: 102 MMOL/L — SIGNIFICANT CHANGE UP (ref 96–108)
CO2 SERPL-SCNC: 29 MMOL/L — SIGNIFICANT CHANGE UP (ref 22–31)
CREAT SERPL-MCNC: 0.5 MG/DL — SIGNIFICANT CHANGE UP (ref 0.5–1.3)
EOSINOPHIL # BLD AUTO: 0.18 K/UL — SIGNIFICANT CHANGE UP (ref 0–0.5)
EOSINOPHIL NFR BLD AUTO: 3.3 % — SIGNIFICANT CHANGE UP (ref 0–6)
GLUCOSE BLDC GLUCOMTR-MCNC: 171 MG/DL — HIGH (ref 70–99)
GLUCOSE BLDC GLUCOMTR-MCNC: 173 MG/DL — HIGH (ref 70–99)
GLUCOSE BLDC GLUCOMTR-MCNC: 200 MG/DL — HIGH (ref 70–99)
GLUCOSE SERPL-MCNC: 131 MG/DL — HIGH (ref 70–99)
HCT VFR BLD CALC: 23.3 % — LOW (ref 34.5–45)
HGB BLD-MCNC: 7.1 G/DL — LOW (ref 11.5–15.5)
IMM GRANULOCYTES NFR BLD AUTO: 0.6 % — SIGNIFICANT CHANGE UP (ref 0–1.5)
LYMPHOCYTES # BLD AUTO: 1.39 K/UL — SIGNIFICANT CHANGE UP (ref 1–3.3)
LYMPHOCYTES # BLD AUTO: 25.8 % — SIGNIFICANT CHANGE UP (ref 13–44)
MAGNESIUM SERPL-MCNC: 1.8 MG/DL — SIGNIFICANT CHANGE UP (ref 1.6–2.6)
MCHC RBC-ENTMCNC: 29.8 PG — SIGNIFICANT CHANGE UP (ref 27–34)
MCHC RBC-ENTMCNC: 30.5 GM/DL — LOW (ref 32–36)
MCV RBC AUTO: 97.9 FL — SIGNIFICANT CHANGE UP (ref 80–100)
MONOCYTES # BLD AUTO: 0.61 K/UL — SIGNIFICANT CHANGE UP (ref 0–0.9)
MONOCYTES NFR BLD AUTO: 11.3 % — SIGNIFICANT CHANGE UP (ref 2–14)
NEUTROPHILS # BLD AUTO: 3.15 K/UL — SIGNIFICANT CHANGE UP (ref 1.8–7.4)
NEUTROPHILS NFR BLD AUTO: 58.6 % — SIGNIFICANT CHANGE UP (ref 43–77)
NRBC # BLD: 0 /100 WBCS — SIGNIFICANT CHANGE UP (ref 0–0)
PHOSPHATE SERPL-MCNC: 4.2 MG/DL — SIGNIFICANT CHANGE UP (ref 2.5–4.5)
PLATELET # BLD AUTO: 194 K/UL — SIGNIFICANT CHANGE UP (ref 150–400)
POTASSIUM SERPL-MCNC: 3.8 MMOL/L — SIGNIFICANT CHANGE UP (ref 3.5–5.3)
POTASSIUM SERPL-SCNC: 3.8 MMOL/L — SIGNIFICANT CHANGE UP (ref 3.5–5.3)
PROT SERPL-MCNC: 6 G/DL — SIGNIFICANT CHANGE UP (ref 6–8.3)
RBC # BLD: 2.38 M/UL — LOW (ref 3.8–5.2)
RBC # FLD: 16.8 % — HIGH (ref 10.3–14.5)
SODIUM SERPL-SCNC: 142 MMOL/L — SIGNIFICANT CHANGE UP (ref 135–145)
WBC # BLD: 5.38 K/UL — SIGNIFICANT CHANGE UP (ref 3.8–10.5)
WBC # FLD AUTO: 5.38 K/UL — SIGNIFICANT CHANGE UP (ref 3.8–10.5)

## 2019-03-18 PROCEDURE — 99232 SBSQ HOSP IP/OBS MODERATE 35: CPT

## 2019-03-18 RX ORDER — ELECTROLYTE SOLUTION,INJ
1 VIAL (ML) INTRAVENOUS
Qty: 0 | Refills: 0 | Status: DISCONTINUED | OUTPATIENT
Start: 2019-03-18 | End: 2019-03-18

## 2019-03-18 RX ORDER — I.V. FAT EMULSION 20 G/100ML
0.81 EMULSION INTRAVENOUS
Qty: 50 | Refills: 0 | Status: DISCONTINUED | OUTPATIENT
Start: 2019-03-18 | End: 2019-03-18

## 2019-03-18 RX ADMIN — ZINC OXIDE 1 APPLICATION(S): 200 OINTMENT TOPICAL at 05:47

## 2019-03-18 RX ADMIN — SODIUM CHLORIDE 10 MILLILITER(S): 9 INJECTION INTRAMUSCULAR; INTRAVENOUS; SUBCUTANEOUS at 12:24

## 2019-03-18 RX ADMIN — I.V. FAT EMULSION 20.85 GM/KG/DAY: 20 EMULSION INTRAVENOUS at 18:09

## 2019-03-18 RX ADMIN — SODIUM CHLORIDE 10 MILLILITER(S): 9 INJECTION INTRAMUSCULAR; INTRAVENOUS; SUBCUTANEOUS at 18:04

## 2019-03-18 RX ADMIN — PANTOPRAZOLE SODIUM 40 MILLIGRAM(S): 20 TABLET, DELAYED RELEASE ORAL at 05:45

## 2019-03-18 RX ADMIN — Medication 2: at 06:16

## 2019-03-18 RX ADMIN — ANASTROZOLE 1 MILLIGRAM(S): 1 TABLET ORAL at 11:32

## 2019-03-18 RX ADMIN — NYSTATIN CREAM 1 APPLICATION(S): 100000 CREAM TOPICAL at 05:46

## 2019-03-18 RX ADMIN — Medication 2: at 17:56

## 2019-03-18 RX ADMIN — Medication 250 MILLIGRAM(S): at 16:29

## 2019-03-18 RX ADMIN — FAMOTIDINE 104 MILLIGRAM(S): 10 INJECTION INTRAVENOUS at 17:59

## 2019-03-18 RX ADMIN — ZINC OXIDE 1 APPLICATION(S): 200 OINTMENT TOPICAL at 13:59

## 2019-03-18 RX ADMIN — Medication 1 EACH: at 18:09

## 2019-03-18 RX ADMIN — Medication 2: at 13:59

## 2019-03-18 RX ADMIN — FAMOTIDINE 104 MILLIGRAM(S): 10 INJECTION INTRAVENOUS at 05:45

## 2019-03-18 RX ADMIN — ERTAPENEM SODIUM 120 MILLIGRAM(S): 1 INJECTION, POWDER, LYOPHILIZED, FOR SOLUTION INTRAMUSCULAR; INTRAVENOUS at 11:31

## 2019-03-18 RX ADMIN — ENOXAPARIN SODIUM 40 MILLIGRAM(S): 100 INJECTION SUBCUTANEOUS at 13:59

## 2019-03-18 RX ADMIN — CHLORHEXIDINE GLUCONATE 1 APPLICATION(S): 213 SOLUTION TOPICAL at 05:45

## 2019-03-18 RX ADMIN — SODIUM CHLORIDE 10 MILLILITER(S): 9 INJECTION INTRAMUSCULAR; INTRAVENOUS; SUBCUTANEOUS at 08:23

## 2019-03-18 RX ADMIN — ZINC OXIDE 1 APPLICATION(S): 200 OINTMENT TOPICAL at 21:18

## 2019-03-18 RX ADMIN — NYSTATIN CREAM 1 APPLICATION(S): 100000 CREAM TOPICAL at 17:57

## 2019-03-18 NOTE — PROGRESS NOTE ADULT - SUBJECTIVE AND OBJECTIVE BOX
GYN Progress Note    Patient seen at bedside this afternoon with GYN team. Patient states she did not walk today with PT. Moved to chair with help of nursing. Reports that her legs feel weak today. Denies any pain. Tolerating clear liquids and jello. Joseph in place. Denies CP, palpitations, SOB, fever, chills, nausea, vomiting.    Vital Signs Last 24 Hrs  T(C): 37.2 (18 Mar 2019 13:19), Max: 37.6 (17 Mar 2019 17:17)  T(F): 99 (18 Mar 2019 13:19), Max: 99.6 (17 Mar 2019 17:17)  HR: 85 (18 Mar 2019 13:19) (78 - 85)  BP: 131/81 (18 Mar 2019 13:19) (128/82 - 159/108)  BP(mean): --  RR: 16 (18 Mar 2019 13:19) (16 - 19)  SpO2: 98% (18 Mar 2019 13:19) (96% - 100%)    Physical Exam:  Gen: No Acute Distress, resting comfortable in chair   Pulm: Normal work of breathing, no wheezes/rhonchi/rales   GI: soft, non tender, nondistended, +BS, no rebound, no guarding, ostomy w/ output  : joseph in place   Ext: no edema/erythema/tenderness     I&O's Summary    17 Mar 2019 07:01  -  18 Mar 2019 07:00  --------------------------------------------------------  IN: 2720.4 mL / OUT: 5550 mL / NET: -2829.6 mL    18 Mar 2019 07:01  -  18 Mar 2019 16:39  --------------------------------------------------------  IN: 240 mL / OUT: 2000 mL / NET: -1760 mL      MEDICATIONS  (STANDING):  anastrozole 1 milliGRAM(s) Oral daily  chlorhexidine 2% Cloths 1 Application(s) Topical <User Schedule>  dextrose 50% Injectable 25 Gram(s) IV Push once  enoxaparin Injectable 40 milliGRAM(s) SubCutaneous daily  famotidine  IVPB 20 milliGRAM(s) IV Intermittent two times a day  fat emulsion (Plant Based) 20% Infusion 0.811 Gm/kG/Day (20.849 mL/Hr) IV Continuous <Continuous>  insulin lispro (HumaLOG) corrective regimen sliding scale   SubCutaneous every 6 hours  nystatin Powder 1 Application(s) Topical two times a day  pantoprazole  Injectable 40 milliGRAM(s) IV Push every 24 hours  Parenteral Nutrition - Adult 1 Each (83 mL/Hr) TPN Continuous <Continuous>  Parenteral Nutrition - Adult 1 Each (83 mL/Hr) TPN Continuous <Continuous>  zinc oxide 20% Ointment 1 Application(s) Topical three times a day    MEDICATIONS  (PRN):  acetaminophen   Tablet .. 650 milliGRAM(s) Oral every 6 hours PRN Temp greater or equal to 38C (100.4F), Mild Pain (1 - 3)  glucagon  Injectable 1 milliGRAM(s) IntraMuscular once PRN Glucose LESS THAN 70 milligrams/deciliter  sodium chloride 0.9% lock flush 10 milliLiter(s) IV Push every 1 hour PRN Pre/post blood products, medications, blood draw, and to maintain line patency      LABS:                        7.1    5.38  )-----------( 194      ( 18 Mar 2019 08:12 )             23.3     03-18    142  |  102  |  15  ----------------------------<  131<H>  3.8   |  29  |  0.50    Ca    9.1      18 Mar 2019 08:12  Phos  4.2     03-18  Mg     1.8     03-18    TPro  6.0  /  Alb  3.1<L>  /  TBili  0.2  /  DBili  x   /  AST  15  /  ALT  18  /  AlkPhos  114  03-18      < from: CT Abdomen and Pelvis w/ Oral Cont and w/ IV Cont (03.15.19 @ 17:22) >  EXAM:  CT ABDOMEN AND PELVIS OC IC                        PROCEDURE DATE:  03/15/2019    INTERPRETATION:  CT of the ABDOMEN and PELVIS with intravenous contrast   dated 3/15/2019 5:22 PM    INDICATION: 60yo F with stage IV endometriod adenocarcinoma s/p staging   surgery '18 and 1 round of chemotherapy 2/19 admitted for management of   symptomatic rectovaginal and enterovaginal fistulas, now with healing   complex fistula associated bacteruria, hemodynamically stable and   afebrile, Joseph in place for overflow incontinence, started on   anastrozole for treatment.    TECHNIQUE: CT of the abdomen and pelvis was performed using oral and   intravenous contrast. Axial, sagittal, and coronal images were produced   and reviewed. 95 cc of optiray 350 was administered intravenously without   reported complication.      PRIOR STUDIES: MRI pelvis from 2/23/2019 and CT abdomen and pelvis from   2/18/2019.    FINDINGS: Images of the lower chest demonstrate areas of mosaic perfusion   compatible with air trapping likely from small airway disease. There is a   new trace right pleural effusion. Portion of central line present near   cavoatrial junction.    The liver is normal in appearance.  The gallbladder is mildly distended,   but is otherwise normal in appearance. No radiopaque stones are seen in   the gallbladder.  The pancreas is normal in appearance.  No splenic   abnormalities are seen. The adrenal glands are unremarkable. There is   again mild bilateral hydroureteronephrosis. There is a 6 mm stone in the   lower pole of the left kidney.    There is a tortuous aorta with small calcified atheromatous plaque.  No   abdominal aortic aneurysm is seen. There are multiple subcentimeter   retroperitoneal lymph nodes.    Patient again post low anterior resection with descending colostomy.   There is no contrast within the vagina. Densities along the left lower   pelvis adjacent to the levator muscle could represent suture material   versus residual contrast within fistula tract (axial series 3, image 99;   coronal series 71417, image 22). Decreased size of fistula tract more   superiorly without communication with vagina (coronal series 79065, image   20). No ascites is seen.    Again post hysterectomy and bilateral oophorectomy. Joseph catheter has   been removed. Bladder distended.     Evaluation of the osseous structures demonstrates degenerative changes of   spine.    IMPRESSION:  1.  Since 2/18/2019, there has been improvement in enterovaginal fistula.   There is no longer contrast in the vagina. Small probable sinus tract   remains left pelvis.    2.  There is still mild hydronephrosis bilaterally.    3.  New trace right pleural effusion.

## 2019-03-18 NOTE — PROGRESS NOTE ADULT - ASSESSMENT
60yo F with stage IV endometrial adenocarcinoma s/p staging surgery '18 and 1 round of chemotherapy 2/19 admitted for management of symptomatic rectovaginal and enterovaginal fistulas, now with healing complex fistula associated bacteruria, hemodynamically stable and afebrile, joseph in place for overflow incontinence, started on anastrozole for treatment.    1. ID: urine culture from 2/27 positive for ESBL E. coli, associated due to complex fistula, s/p treatement with bactrim DS BID on 3/1-3/8, repeat urine cx 3/6 w/ MRSA and ESBL proteus resistant to TMP-SMX  - ID reconsulted for fever 3/12, recommend 7 day course of IV vanc and ertepenem, then no oral antibiotics needed, then wait 72 hours then re-culture urine to assess "contact" status.  will d/c IV ABX today.  - Continue Vancomycin 1g daily for duration of treatment; continue ertepenem -- day 7/7.  - CT abdomen/pelvis w/ IV and oral contrast performed, will f/u official read  2. FEN/GI - clears, IVH, replete electrolytes PRN, octreotide daily on TPN, PICC placed 2/27, electrolytes repleted via TPN, lipids every other day  3. PAIN - overall well controlled, OPM and IV prn  4.  - joseph placed given urinary retention; Urology consulted today  -no current leaking of stool  -continue with nystatin powder and ointment for mons and labial irritation as well as zinc oxide  - careful urine output to monitor for signs of post obstructive diuresis  5. Endocrine- ISS while patient remains NPO, holding home metformin at this time  6. RESP-  Saturating well on room air, encouraged ISS  7. VTE prophylaxis - SCDs, ambulate as tolerated with PT, Lovenox 40qd, PT    8 Palliative: palliative consulted, appreciate recommendations  - pt no longer desires Lexapro 10mg qhs

## 2019-03-18 NOTE — PROGRESS NOTE ADULT - SUBJECTIVE AND OBJECTIVE BOX
GYN PROGRESS NOTE PGY4    Patient evaluated at the bedside. No complaints. Tolerating clears (ice cream, jello). Painter in place with adequate UOP. Pt ambulated over the weekend with PT but still has knee pain. On IV vanc/ertapenem.  CT last week showed distended bladder, unable to assess fistula, and no disease progression therefore patient started on anastrozole 1mg qd.    T(C): 36.4 (03-18-19 @ 05:23), Max: 37.6 (03-17-19 @ 17:17)  HR: 80 (03-18-19 @ 05:23) (78 - 84)  BP: 132/84 (03-18-19 @ 05:23) (130/84 - 159/108)  RR: 17 (03-18-19 @ 05:23) (16 - 19)  SpO2: 96% (03-18-19 @ 05:23) (96% - 100%)    GEN: patient appears well, tired  LUNGS: no respiratory distress  ABD: soft, non tender, non distended, ostomy w/ output  : PAINTER  EXT: no calf tenderness; picc site no sign of infection        03-17 @ 07:01  -  03-18 @ 07:00  --------------------------------------------------------  IN: 2720.4 mL / OUT: 5550 mL / NET: -2829.6 mL      MEDICATIONS  (STANDING):  anastrozole 1 milliGRAM(s) Oral daily  chlorhexidine 2% Cloths 1 Application(s) Topical <User Schedule>  dextrose 50% Injectable 25 Gram(s) IV Push once  enoxaparin Injectable 40 milliGRAM(s) SubCutaneous daily  ertapenem  IVPB 1000 milliGRAM(s) IV Intermittent every 24 hours  ertapenem  IVPB      famotidine  IVPB 20 milliGRAM(s) IV Intermittent two times a day  insulin lispro (HumaLOG) corrective regimen sliding scale   SubCutaneous every 6 hours  nystatin Powder 1 Application(s) Topical two times a day  pantoprazole  Injectable 40 milliGRAM(s) IV Push every 24 hours  Parenteral Nutrition - Adult 1 Each (83 mL/Hr) TPN Continuous <Continuous>  vancomycin  IVPB 1000 milliGRAM(s) IV Intermittent every 24 hours  zinc oxide 20% Ointment 1 Application(s) Topical three times a day    MEDICATIONS  (PRN):  acetaminophen   Tablet .. 650 milliGRAM(s) Oral every 6 hours PRN Temp greater or equal to 38C (100.4F), Mild Pain (1 - 3)  glucagon  Injectable 1 milliGRAM(s) IntraMuscular once PRN Glucose LESS THAN 70 milligrams/deciliter  sodium chloride 0.9% lock flush 10 milliLiter(s) IV Push every 1 hour PRN Pre/post blood products, medications, blood draw, and to maintain line patency

## 2019-03-18 NOTE — CHART NOTE - NSCHARTNOTEFT_GEN_A_CORE
Admitting Diagnosis:   Patient is a 59y old  Female who presents with a chief complaint of Rectovaginal and enterovaginal fistulas (06 Mar 2019 11:51)      PAST MEDICAL & SURGICAL HISTORY:      Current Nutrition Order:  Clear liquid diet as of 3/14  TPN via PICC:  230g Dex, 79g AA, 50g 20% lipids 4x/week (1598kcal, 1.65g pro/kg IBW, GIR 2.5g)    PO Intake: Good (%) [   ]  Fair (50-75%) [   ] Poor (<25%) [   ] Consumed broth, jello and icees. Preferring Jello and Icees.    GI Issues:   LLQ colostomy  no output today per RN  Denies N/V after drinking fluids    Pain:  No pain reported     Skin Integrity:  IAD at perirenal area  Colostomy in place  carmen score 16    Labs:       142  |  102  |  15  ----------------------------<  131<H>  3.8   |  29  |  0.50    Ca    9.1      18 Mar 2019 08:12  Phos  4.2     -  Mg     1.8     -    TPro  6.0  /  Alb  3.1<L>  /  TBili  0.2  /  DBili  x   /  AST  15  /  ALT  18  /  AlkPhos  114  -    CAPILLARY BLOOD GLUCOSE      POCT Blood Glucose.: 171 mg/dL (18 Mar 2019 13:44)  POCT Blood Glucose.: 173 mg/dL (18 Mar 2019 06:11)  POCT Blood Glucose.: 134 mg/dL (17 Mar 2019 23:50)  POCT Blood Glucose.: 160 mg/dL (17 Mar 2019 18:09)      Medications:  MEDICATIONS  (STANDING):  anastrozole 1 milliGRAM(s) Oral daily  chlorhexidine 2% Cloths 1 Application(s) Topical <User Schedule>  dextrose 50% Injectable 25 Gram(s) IV Push once  enoxaparin Injectable 40 milliGRAM(s) SubCutaneous daily  famotidine  IVPB 20 milliGRAM(s) IV Intermittent two times a day  fat emulsion (Plant Based) 20% Infusion 0.811 Gm/kG/Day (20.849 mL/Hr) IV Continuous <Continuous>  insulin lispro (HumaLOG) corrective regimen sliding scale   SubCutaneous every 6 hours  nystatin Powder 1 Application(s) Topical two times a day  pantoprazole  Injectable 40 milliGRAM(s) IV Push every 24 hours  Parenteral Nutrition - Adult 1 Each (83 mL/Hr) TPN Continuous <Continuous>  Parenteral Nutrition - Adult 1 Each (83 mL/Hr) TPN Continuous <Continuous>  vancomycin  IVPB 1000 milliGRAM(s) IV Intermittent every 24 hours  zinc oxide 20% Ointment 1 Application(s) Topical three times a day    MEDICATIONS  (PRN):  acetaminophen   Tablet .. 650 milliGRAM(s) Oral every 6 hours PRN Temp greater or equal to 38C (100.4F), Mild Pain (1 - 3)  glucagon  Injectable 1 milliGRAM(s) IntraMuscular once PRN Glucose LESS THAN 70 milligrams/deciliter  sodium chloride 0.9% lock flush 10 milliLiter(s) IV Push every 1 hour PRN Pre/post blood products, medications, blood draw, and to maintain line patency      Weight: 136lbs  Height: 5'1", IBW 105lbs+/-10%, %%, BMI 25.7  Daily     Weight Change:   Denies any recent wt changes. Good appetite PTA.  Please trend weights while on TPN   D/w RN last week and current RN today to get updated wt.    Estimated energy needs:   IBW used for calculations as pt >120% of IBW   Nutrient needs based on [facility] standards of care for maintenance in adults.   Needs adjusted 2/2 catabolic illness, chemo treatments. Fluids adjusted 2/2 colostomy.  1428-1666kcal/day (30-35kcal/kg)  57-67g pro/day (1.2-1.4g/kg)  1428-1666ml fluid/day (30-35ml/kg)    Subjective:   60yo F with stage IV endometrial adenocarcinoma s/p staging surgery ' and 1 round of chemotherapy 3 wks ago. Was planned to complete 2nd cycle of chemo today . Admitted for management of symptomatic rectovaginal and enterovaginal fistulas. Now +MRSA. Per surgery, no surgical intervention at this time. Ordered for octreotide. PICC line placed (). Pt currently ordered for  230g Dex, 79g AA, 50g 20% lipids 4x/week (1598kcal, 1.65g pro/kg IBW, GIR 2.5g). TPN infusing at time of assessment. Now also ordered for Clear Liquid diet (3/14). Pt seems to be tolerating well- consumed jello, icees, and broth. Pt inquired when diet could be advanced to solid foods- discussed gradual advancement for monitoring tolerance and further evaluation of the integrity of the fistula. Pt receptive and expressed understanding. Denies N/V, no abdominal comfort after drinking liquids. No ostomy output today per RN. POCT B, 173, 134, 160mg/dL; lytes WNL except; T (3/10) 287mg/dL (3/6), 189mg/dL (3/4), 383mg/dL (3/3).  Please trend TG 2x/week and monitor need for 20g 20% lipids 4x/week vs 50g. TPN order appropriate. Advancement per team. Pt understanding of meeting needs via TPN until diet is able to be advanced further. Please obtain new wt to assess adequacy of feeds. RD to follow.     Previous Nutrition Diagnosis:  increased nutrient needs RT increased demand for kcal/pro AEB catabolic illness, s/p chemo treatment and possible fistula losses.   Active [ x  ]  Resolved [   ]    If resolved, new PES:     Goal:  Pt to consistently meet % of estimated needs via most appropriate route    Recommendations:  1) Continue w/ current PN order: 230g Dex, 67g AA, 50g 20% lipids 4x/week (1550kcal, 1.4g pro/kg IBW, GIR 2.5g).   Recommend checking TG 2x/week. Check Mg, Phos, K daily and POC BG Q6hrs. Trend daily weights. Fluids and lytes per MD discretion.   2)  If TG >400mg/dL, consider switching to 20g 20% lipids vs 50g 4x/week.  3) When PO is deemed medically feasible recommend advancing to Clear Liquid diet w/ EnsureClears TID (720kcal, 24g pro) >> Full liquids w/ EnsureEnlive TID (1050kcal, 60g pro).  4) Please continue w/ PN as primary form of nutrition support until pt is capable of meeting >60% EER PO  5) please obtain updated weight  *d/w team new TG lab    Education:   pt understanding of meeting needs via TPN for time being.     Risk Level: High [ x  ] Moderate [   ] Low [   ].

## 2019-03-18 NOTE — CONSULT NOTE ADULT - ATTENDING COMMENTS
60yo female with complex surgical history, recent diagnosis of rectovaginal fistula and urinary incontinence. 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 Would recommend [place] catheter for now. Could transition to [facility]IC when prior to discharge. Urodynamics as outpatient.

## 2019-03-18 NOTE — CONSULT NOTE ADULT - SUBJECTIVE AND OBJECTIVE BOX
CONSULT NOTE:    Patient is a 59y old  Female who presents with a chief complaint of Rectovaginal and enterovaginal fistulas (06 Mar 2019 11:51)      HPI:  58yo G0 with PMHx significant for breast cancer, diabetes mellitus, endometrial cancer stage 3, s/p exploratory laparotomy, enterolysis, SHAMIR, BSO, pelvic lymphadenectomy, low anterior resection mobilization of splenic flexure, end colostomy on 7/30/18 now with rectovaginal fistula.  She was admitted to [facility] one week ago for treatment of UTI, and now transferred to [facility] for further care.     Pt denies fever, chills, chest pain, SOB, abdominal pain, nausea, vomiting, vaginal bleeding.  She reports having pain and discomfort around her perineum.  She reports being completely incontinent of urine.  Approximately 1 week ago she had leakage of stool from vagina, and was subsequently admitted to [facility] where she reports receiving IV antibiotics.  She has not been ambulatory for the past month since discharge from her rehab facility.  She has undergone one round of chemotherapy approximately 3 weeks ago; to complete second cycle of chemotherapy this monday, 2/25.  Enterovaginal and rectovaginal fistulization found on MRI on 2/18/19 at [facility].    Urology Subjective: As noted above. Patient has had overflow incontinence since procedure in July 2018. She states she has been unable to hold urine and "wets the bed" since her procedure. Denies dysuria, hematuria, fatigue, back pain.      Vital Signs Last 24 Hrs  T(C): 36.6 (18 Mar 2019 17:33), Max: 37.2 (18 Mar 2019 13:19)  T(F): 97.8 (18 Mar 2019 17:33), Max: 99 (18 Mar 2019 13:19)  HR: 76 (18 Mar 2019 17:33) (76 - 85)  BP: 154/102 (18 Mar 2019 17:33) (128/82 - 154/102)  BP(mean): --  RR: 17 (18 Mar 2019 17:33) (16 - 17)  SpO2: 98% (18 Mar 2019 17:33) (96% - 98%)  I&O's Summary    17 Mar 2019 07:01  -  18 Mar 2019 07:00  --------------------------------------------------------  IN: 2720.4 mL / OUT: 5550 mL / NET: -2829.6 mL    18 Mar 2019 07:01  -  18 Mar 2019 19:57  --------------------------------------------------------  IN: 1370 mL / OUT: 2000 mL / NET: -630 mL        PE:  Gen: NAD  Abd: soft, ntnd, no CVAT b/l  : opal cheng clear    LABS:                        7.1    5.38  )-----------( 194      ( 18 Mar 2019 08:12 )             23.3     03-18    142  |  102  |  15  ----------------------------<  131<H>  3.8   |  29  |  0.50    Ca    9.1      18 Mar 2019 08:12  Phos  4.2     03-18  Mg     1.8     03-18    TPro  6.0  /  Alb  3.1<L>  /  TBili  0.2  /  DBili  x   /  AST  15  /  ALT  18  /  AlkPhos  114  03-18      Cultures      A/P:

## 2019-03-18 NOTE — PROGRESS NOTE ADULT - ASSESSMENT
60yo F with stage IV endometrial adenocarcinoma s/p staging surgery '18 and 1 round of chemotherapy 2/19 admitted for management of symptomatic rectovaginal and enterovaginal fistulas, now with healing complex fistula associated bacteruria, hemodynamically stable and afebrile, joseph in place for overflow incontinence, started on anastrozole for treatment (3/16-)    1. ID: urine culture from 2/27 positive for ESBL E. coli, associated due to complex fistula, s/p treatement with bactrim DS BID on 3/1-3/8, repeat urine cx 3/6 w/ MRSA and ESBL proteus resistant to TMP-SMX  - ID reconsulted for fever 3/12, recommend 7 day course of IV vanc and ertepenem, then no oral antibiotics needed, then wait 72 hours then re-culture urine to assess "contact" status.  discontinued IV ABX today.  2. FEN/GI - clears, IVH, replete electrolytes PRN, octreotide daily on TPN, PICC placed 2/27, electrolytes repleted via TPN, lipids every other day  3. PAIN - overall well controlled, OPM and IV prn  4.  - joseph placed given urinary retention; Urology consulted today for possible permanent catheter   -no current leaking of stool  -continue with nystatin powder and ointment for mons and labial irritation as well as zinc oxide  - careful monitoring urine output to monitor for signs of post obstructive diuresis  - CT abdomen/pelvis w/ IV and oral contrast performed 3/15 showed improved enterovaginal fistula, no longer contrast in vagina  - continue with anastrozole for treatment   5. Endocrine- ISS , holding home metformin at this time  6. RESP-  Saturating well on room air, encouraged ISS  7. VTE prophylaxis - SCDs, ambulate as tolerated with PT, Lovenox 40qd, PT    8 Palliative: palliative consulted, appreciate recommendations  - pt no longer desires Lexapro 10mg qhs

## 2019-03-19 LAB
ALBUMIN SERPL ELPH-MCNC: 3.1 G/DL — LOW (ref 3.3–5)
ALP SERPL-CCNC: 120 U/L — SIGNIFICANT CHANGE UP (ref 40–120)
ALT FLD-CCNC: 16 U/L — SIGNIFICANT CHANGE UP (ref 10–45)
ANION GAP SERPL CALC-SCNC: 8 MMOL/L — SIGNIFICANT CHANGE UP (ref 5–17)
AST SERPL-CCNC: 12 U/L — SIGNIFICANT CHANGE UP (ref 10–40)
BASOPHILS # BLD AUTO: 0.02 K/UL — SIGNIFICANT CHANGE UP (ref 0–0.2)
BASOPHILS NFR BLD AUTO: 0.3 % — SIGNIFICANT CHANGE UP (ref 0–2)
BILIRUB SERPL-MCNC: 0.2 MG/DL — SIGNIFICANT CHANGE UP (ref 0.2–1.2)
BUN SERPL-MCNC: 17 MG/DL — SIGNIFICANT CHANGE UP (ref 7–23)
CALCIUM SERPL-MCNC: 9.1 MG/DL — SIGNIFICANT CHANGE UP (ref 8.4–10.5)
CHLORIDE SERPL-SCNC: 104 MMOL/L — SIGNIFICANT CHANGE UP (ref 96–108)
CHOLEST SERPL-MCNC: 86 MG/DL — SIGNIFICANT CHANGE UP (ref 10–199)
CO2 SERPL-SCNC: 29 MMOL/L — SIGNIFICANT CHANGE UP (ref 22–31)
CREAT SERPL-MCNC: 0.5 MG/DL — SIGNIFICANT CHANGE UP (ref 0.5–1.3)
EOSINOPHIL # BLD AUTO: 0.25 K/UL — SIGNIFICANT CHANGE UP (ref 0–0.5)
EOSINOPHIL NFR BLD AUTO: 4.1 % — SIGNIFICANT CHANGE UP (ref 0–6)
GLUCOSE BLDC GLUCOMTR-MCNC: 138 MG/DL — HIGH (ref 70–99)
GLUCOSE BLDC GLUCOMTR-MCNC: 159 MG/DL — HIGH (ref 70–99)
GLUCOSE BLDC GLUCOMTR-MCNC: 179 MG/DL — HIGH (ref 70–99)
GLUCOSE BLDC GLUCOMTR-MCNC: 244 MG/DL — HIGH (ref 70–99)
GLUCOSE SERPL-MCNC: 114 MG/DL — HIGH (ref 70–99)
HCT VFR BLD CALC: 23.5 % — LOW (ref 34.5–45)
HDLC SERPL-MCNC: 23 MG/DL — LOW
HGB BLD-MCNC: 7.2 G/DL — LOW (ref 11.5–15.5)
IMM GRANULOCYTES NFR BLD AUTO: 0.5 % — SIGNIFICANT CHANGE UP (ref 0–1.5)
LIPID PNL WITH DIRECT LDL SERPL: 29 MG/DL — SIGNIFICANT CHANGE UP
LYMPHOCYTES # BLD AUTO: 1.32 K/UL — SIGNIFICANT CHANGE UP (ref 1–3.3)
LYMPHOCYTES # BLD AUTO: 21.4 % — SIGNIFICANT CHANGE UP (ref 13–44)
MAGNESIUM SERPL-MCNC: 1.8 MG/DL — SIGNIFICANT CHANGE UP (ref 1.6–2.6)
MCHC RBC-ENTMCNC: 30.3 PG — SIGNIFICANT CHANGE UP (ref 27–34)
MCHC RBC-ENTMCNC: 30.6 GM/DL — LOW (ref 32–36)
MCV RBC AUTO: 98.7 FL — SIGNIFICANT CHANGE UP (ref 80–100)
MONOCYTES # BLD AUTO: 0.73 K/UL — SIGNIFICANT CHANGE UP (ref 0–0.9)
MONOCYTES NFR BLD AUTO: 11.8 % — SIGNIFICANT CHANGE UP (ref 2–14)
NEUTROPHILS # BLD AUTO: 3.82 K/UL — SIGNIFICANT CHANGE UP (ref 1.8–7.4)
NEUTROPHILS NFR BLD AUTO: 61.9 % — SIGNIFICANT CHANGE UP (ref 43–77)
NRBC # BLD: 0 /100 WBCS — SIGNIFICANT CHANGE UP (ref 0–0)
PHOSPHATE SERPL-MCNC: 3.8 MG/DL — SIGNIFICANT CHANGE UP (ref 2.5–4.5)
PLATELET # BLD AUTO: 222 K/UL — SIGNIFICANT CHANGE UP (ref 150–400)
POTASSIUM SERPL-MCNC: 4.2 MMOL/L — SIGNIFICANT CHANGE UP (ref 3.5–5.3)
POTASSIUM SERPL-SCNC: 4.2 MMOL/L — SIGNIFICANT CHANGE UP (ref 3.5–5.3)
PROT SERPL-MCNC: 6.1 G/DL — SIGNIFICANT CHANGE UP (ref 6–8.3)
RBC # BLD: 2.38 M/UL — LOW (ref 3.8–5.2)
RBC # FLD: 17.1 % — HIGH (ref 10.3–14.5)
SODIUM SERPL-SCNC: 141 MMOL/L — SIGNIFICANT CHANGE UP (ref 135–145)
TOTAL CHOLESTEROL/HDL RATIO MEASUREMENT: 3.7 RATIO — SIGNIFICANT CHANGE UP (ref 3.3–7.1)
TRIGL SERPL-MCNC: 168 MG/DL — HIGH (ref 10–149)
WBC # BLD: 6.17 K/UL — SIGNIFICANT CHANGE UP (ref 3.8–10.5)
WBC # FLD AUTO: 6.17 K/UL — SIGNIFICANT CHANGE UP (ref 3.8–10.5)

## 2019-03-19 PROCEDURE — 99232 SBSQ HOSP IP/OBS MODERATE 35: CPT

## 2019-03-19 RX ORDER — ELECTROLYTE SOLUTION,INJ
1 VIAL (ML) INTRAVENOUS
Qty: 0 | Refills: 0 | Status: DISCONTINUED | OUTPATIENT
Start: 2019-03-19 | End: 2019-03-19

## 2019-03-19 RX ADMIN — ENOXAPARIN SODIUM 40 MILLIGRAM(S): 100 INJECTION SUBCUTANEOUS at 12:09

## 2019-03-19 RX ADMIN — Medication 4: at 18:42

## 2019-03-19 RX ADMIN — PANTOPRAZOLE SODIUM 40 MILLIGRAM(S): 20 TABLET, DELAYED RELEASE ORAL at 06:52

## 2019-03-19 RX ADMIN — Medication 2: at 06:52

## 2019-03-19 RX ADMIN — ZINC OXIDE 1 APPLICATION(S): 200 OINTMENT TOPICAL at 21:41

## 2019-03-19 RX ADMIN — NYSTATIN CREAM 1 APPLICATION(S): 100000 CREAM TOPICAL at 21:41

## 2019-03-19 RX ADMIN — Medication 2: at 00:56

## 2019-03-19 RX ADMIN — NYSTATIN CREAM 1 APPLICATION(S): 100000 CREAM TOPICAL at 05:50

## 2019-03-19 RX ADMIN — ZINC OXIDE 1 APPLICATION(S): 200 OINTMENT TOPICAL at 16:35

## 2019-03-19 RX ADMIN — FAMOTIDINE 104 MILLIGRAM(S): 10 INJECTION INTRAVENOUS at 05:50

## 2019-03-19 RX ADMIN — ZINC OXIDE 1 APPLICATION(S): 200 OINTMENT TOPICAL at 05:50

## 2019-03-19 RX ADMIN — CHLORHEXIDINE GLUCONATE 1 APPLICATION(S): 213 SOLUTION TOPICAL at 06:53

## 2019-03-19 RX ADMIN — ANASTROZOLE 1 MILLIGRAM(S): 1 TABLET ORAL at 12:10

## 2019-03-19 RX ADMIN — Medication 1 EACH: at 19:34

## 2019-03-19 RX ADMIN — FAMOTIDINE 104 MILLIGRAM(S): 10 INJECTION INTRAVENOUS at 18:41

## 2019-03-19 NOTE — PROGRESS NOTE ADULT - SUBJECTIVE AND OBJECTIVE BOX
GYN PROGRESS NOTE PGY4    Patient evaluated at the bedside. No acute events. Tolerated clears yesterday. Attempted to walk with gyn team but too weak. Plan for PT today. OOB to chair most of the day. Still on TPN. Very minimal leakage from below. [place] in place. Seen by Urology yesterday.     T(C): 36.4 (03-19-19 @ 05:53), Max: 37.2 (03-18-19 @ 13:19)  HR: 83 (03-19-19 @ 05:53) (76 - 85)  BP: 117/78 (03-19-19 @ 05:53) (117/78 - 154/102)  RR: 16 (03-19-19 @ 05:53) (16 - 17)  SpO2: 96% (03-19-19 @ 05:53) (96% - 98%)    GEN: patient appears well in bed  LUNGS: no respiratory distress  ABD: soft, non tender, non distended, ostomy w/ output  EXT: no calf tenderness        03-17 @ 07:01  -  03-18 @ 07:00  --------------------------------------------------------  IN: 2720.4 mL / OUT: 5550 mL / NET: -2829.6 mL    03-18 @ 07:01  -  03-19 @ 06:16  --------------------------------------------------------  IN: 2146.6 mL / OUT: 4625 mL / NET: -2478.4 mL      MEDICATIONS  (STANDING):  anastrozole 1 milliGRAM(s) Oral daily  chlorhexidine 2% Cloths 1 Application(s) Topical <User Schedule>  dextrose 50% Injectable 25 Gram(s) IV Push once  enoxaparin Injectable 40 milliGRAM(s) SubCutaneous daily  famotidine  IVPB 20 milliGRAM(s) IV Intermittent two times a day  fat emulsion (Plant Based) 20% Infusion 0.811 Gm/kG/Day (20.849 mL/Hr) IV Continuous <Continuous>  insulin lispro (HumaLOG) corrective regimen sliding scale   SubCutaneous every 6 hours  nystatin Powder 1 Application(s) Topical two times a day  pantoprazole  Injectable 40 milliGRAM(s) IV Push every 24 hours  Parenteral Nutrition - Adult 1 Each (83 mL/Hr) TPN Continuous <Continuous>  zinc oxide 20% Ointment 1 Application(s) Topical three times a day    MEDICATIONS  (PRN):  acetaminophen   Tablet .. 650 milliGRAM(s) Oral every 6 hours PRN Temp greater or equal to 38C (100.4F), Mild Pain (1 - 3)  glucagon  Injectable 1 milliGRAM(s) IntraMuscular once PRN Glucose LESS THAN 70 milligrams/deciliter  sodium chloride 0.9% lock flush 10 milliLiter(s) IV Push every 1 hour PRN Pre/post blood products, medications, blood draw, and to maintain line patency                              7.1    5.38  )-----------( 194      ( 18 Mar 2019 08:12 )             23.3       03-18    142  |  102  |  15  ----------------------------<  131<H>  3.8   |  29  |  0.50    Ca    9.1      18 Mar 2019 08:12  Phos  4.2     03-18  Mg     1.8     03-18    TPro  6.0  /  Alb  3.1<L>  /  TBili  0.2  /  DBili  x   /  AST  15  /  ALT  18  /  AlkPhos  114  03-18

## 2019-03-19 NOTE — PROGRESS NOTE ADULT - SUBJECTIVE AND OBJECTIVE BOX
Patient seen and examined at bedside; PICC line last visualized yesterday and was c/d/i and fully in place. Today Picc noted to be out ~2cm, still clean/d/i. Dressing changed and new statlock applied, biopatch applied, redressed with tegaderm. Picc flushes without difficulty. Nursing/team notified that PICC still ok to use.

## 2019-03-19 NOTE — PROGRESS NOTE ADULT - ASSESSMENT
58yo F with stage IV endometrial adenocarcinoma s/p staging surgery '18 and 1 round of chemotherapy 2/19 admitted for management of symptomatic rectovaginal and enterovaginal fistulas, now with healing complex fistula associated bacteruria, hemodynamically stable and afebrile, joseph in place for overflow incontinence, re-started on anastrozole for treatment (3/16-)     1. ID: urine culture from 2/27 positive for ESBL E. coli, likely due to complex fistula, s/p treatement with bactrim DS BID on 3/1-3/8, repeat urine cx 3/6 w/ MRSA and ESBL proteus resistant to TMP-SMX  - ID reconsulted for fever 3/12, recommend 7 day course of IV vanc and ertepenem, then no oral antibiotics needed, then wait 72 hours then re-culture urine to assess "contact" status.  discontinued IV ABX yesterday as course completed.  2. FEN/GI - clears, IVH, replete electrolytes PRN, octreotide daily on TPN, PICC placed 2/27, electrolytes repleted via TPN, lipids every other day  3. PAIN - overall well controlled, OPM and IV prn, occasional abdominal and knee pain  4.  - joseph placed given urinary retention 3/16; Urology consulted yesterday for possible permanent catheter, waiting for final recs  - no current active leaking of stool  - continue with nystatin powder and ointment for mons and labial irritation as well as zinc oxide  - careful monitoring urine output to monitor for signs of post obstructive diuresis  - CT abdomen/pelvis w/ IV and oral contrast performed 3/15 showed improved enterovaginal fistula, no longer contrast in vagina  - continue with anastrozole for treatment   5. Endocrine- ISS, holding home metformin at this time  6. RESP - Saturating well on room air, encouraged ISS  7. VTE prophylaxis - SCDs, ambulate as tolerated with PT, Lovenox 40qd, PT    8. Palliative: palliative consulted, appreciate recommendations  - pt no longer desires Lexapro 10mg qhs     Disposition: Possible placement at [facility] [place]

## 2019-03-19 NOTE — PROGRESS NOTE ADULT - SUBJECTIVE AND OBJECTIVE BOX
GYN PROGRESS NOTE    Patient evaluated at the bedside. No acute events. Patient reports that she was informed that she has a place at an [facility] and wants to know her disposition will be. She feels well. She is hungry and wants to eat real food not just jello. She denies CP/SOB/dizziness/nausea/vomiting/abdominal pain/calf pain. No leaking of stool or urine at this time. No discomfort from the joseph. Patient would like to know for how long she will need the joseph. Patient would like to continue visiting with her sister in law and friend at this time.     O:   T(C): 36.6 (03-19-19 @ 13:18), Max: 36.9 (03-19-19 @ 09:10)  HR: 81 (03-19-19 @ 13:18) (76 - 83)  BP: 122/79 (03-19-19 @ 13:18) (117/78 - 154/102)  RR: 18 (03-19-19 @ 13:18) (16 - 18)  SpO2: 99% (03-19-19 @ 13:18) (96% - 99%)  Wt(kg): --    GEN: patient appears well, sitting in the chair, family at bedside   LUNGS: no respiratory distress  ABD: soft, nontender  EXT: no calf tenderness, symmetric         03-18 @ 07:01  -  03-19 @ 07:00  --------------------------------------------------------  IN: 2561.8 mL / OUT: 4625 mL / NET: -2063.2 mL    03-19 @ 07:01  -  03-19 @ 14:54  --------------------------------------------------------  IN: 530 mL / OUT: 1300 mL / NET: -770 mL

## 2019-03-19 NOTE — PROGRESS NOTE ADULT - ASSESSMENT
58yo F with stage IV endometrial adenocarcinoma s/p staging surgery '18 and 1 round of chemotherapy 2/19 admitted for management of symptomatic rectovaginal and enterovaginal fistulas, now with healing complex fistula associated bacteruria, hemodynamically stable and afebrile, joseph in place for overflow incontinence, re-started on anastrozole for treatment (3/16-)     1. ID: urine culture from 2/27 positive for ESBL E. coli, associated due to complex fistula, s/p treatement with bactrim DS BID on 3/1-3/8, repeat urine cx 3/6 w/ MRSA and ESBL proteus resistant to TMP-SMX  - ID reconsulted for fever 3/12, recommend 7 day course of IV vanc and ertepenem, then no oral antibiotics needed, then wait 72 hours then re-culture urine to assess "contact" status.  discontinued IV ABX yesterday as course completed.  2. FEN/GI - clears, IVH, replete electrolytes PRN, octreotide daily on TPN, PICC placed 2/27, electrolytes repleted via TPN, lipids every other day  3. PAIN - overall well controlled, OPM and IV prn, occasional abdominal and knee pain  4.  - joseph placed given urinary retention 3/16; Urology consulted yesterday for possible permanent catheter, waiting for final recs  - no current active leaking of stool  - continue with nystatin powder and ointment for mons and labial irritation as well as zinc oxide  - careful monitoring urine output to monitor for signs of post obstructive diuresis  - CT abdomen/pelvis w/ IV and oral contrast performed 3/15 showed improved enterovaginal fistula, no longer contrast in vagina  - continue with anastrozole for treatment   5. Endocrine- ISS, holding home metformin at this time  6. RESP - Saturating well on room air, encouraged ISS  7. VTE prophylaxis - SCDs, ambulate as tolerated with PT, Lovenox 40qd, PT    8. Palliative: palliative consulted, appreciate recommendations  - pt no longer desires Lexapro 10mg qhs

## 2019-03-20 LAB
ANION GAP SERPL CALC-SCNC: 9 MMOL/L — SIGNIFICANT CHANGE UP (ref 5–17)
BUN SERPL-MCNC: 18 MG/DL — SIGNIFICANT CHANGE UP (ref 7–23)
CALCIUM SERPL-MCNC: 9.5 MG/DL — SIGNIFICANT CHANGE UP (ref 8.4–10.5)
CHLORIDE SERPL-SCNC: 103 MMOL/L — SIGNIFICANT CHANGE UP (ref 96–108)
CO2 SERPL-SCNC: 30 MMOL/L — SIGNIFICANT CHANGE UP (ref 22–31)
CREAT SERPL-MCNC: 0.54 MG/DL — SIGNIFICANT CHANGE UP (ref 0.5–1.3)
GLUCOSE BLDC GLUCOMTR-MCNC: 142 MG/DL — HIGH (ref 70–99)
GLUCOSE BLDC GLUCOMTR-MCNC: 153 MG/DL — HIGH (ref 70–99)
GLUCOSE BLDC GLUCOMTR-MCNC: 179 MG/DL — HIGH (ref 70–99)
GLUCOSE BLDC GLUCOMTR-MCNC: 241 MG/DL — HIGH (ref 70–99)
GLUCOSE SERPL-MCNC: 108 MG/DL — HIGH (ref 70–99)
HCT VFR BLD CALC: 24.6 % — LOW (ref 34.5–45)
HGB BLD-MCNC: 7.6 G/DL — LOW (ref 11.5–15.5)
MAGNESIUM SERPL-MCNC: 2 MG/DL — SIGNIFICANT CHANGE UP (ref 1.6–2.6)
MCHC RBC-ENTMCNC: 30.6 PG — SIGNIFICANT CHANGE UP (ref 27–34)
MCHC RBC-ENTMCNC: 30.9 GM/DL — LOW (ref 32–36)
MCV RBC AUTO: 99.2 FL — SIGNIFICANT CHANGE UP (ref 80–100)
NRBC # BLD: 0 /100 WBCS — SIGNIFICANT CHANGE UP (ref 0–0)
PHOSPHATE SERPL-MCNC: 4.7 MG/DL — HIGH (ref 2.5–4.5)
PLATELET # BLD AUTO: 222 K/UL — SIGNIFICANT CHANGE UP (ref 150–400)
POTASSIUM SERPL-MCNC: 4.6 MMOL/L — SIGNIFICANT CHANGE UP (ref 3.5–5.3)
POTASSIUM SERPL-SCNC: 4.6 MMOL/L — SIGNIFICANT CHANGE UP (ref 3.5–5.3)
RBC # BLD: 2.48 M/UL — LOW (ref 3.8–5.2)
RBC # FLD: 17 % — HIGH (ref 10.3–14.5)
SODIUM SERPL-SCNC: 142 MMOL/L — SIGNIFICANT CHANGE UP (ref 135–145)
WBC # BLD: 5.39 K/UL — SIGNIFICANT CHANGE UP (ref 3.8–10.5)
WBC # FLD AUTO: 5.39 K/UL — SIGNIFICANT CHANGE UP (ref 3.8–10.5)

## 2019-03-20 PROCEDURE — 99232 SBSQ HOSP IP/OBS MODERATE 35: CPT

## 2019-03-20 RX ORDER — IBUPROFEN 200 MG
600 TABLET ORAL EVERY 6 HOURS
Qty: 0 | Refills: 0 | Status: DISCONTINUED | OUTPATIENT
Start: 2019-03-20 | End: 2019-03-25

## 2019-03-20 RX ORDER — I.V. FAT EMULSION 20 G/100ML
50 EMULSION INTRAVENOUS ONCE
Qty: 0 | Refills: 0 | Status: COMPLETED | OUTPATIENT
Start: 2019-03-20 | End: 2019-03-20

## 2019-03-20 RX ORDER — ELECTROLYTE SOLUTION,INJ
1 VIAL (ML) INTRAVENOUS
Qty: 0 | Refills: 0 | Status: DISCONTINUED | OUTPATIENT
Start: 2019-03-20 | End: 2019-03-20

## 2019-03-20 RX ADMIN — Medication 650 MILLIGRAM(S): at 03:42

## 2019-03-20 RX ADMIN — PANTOPRAZOLE SODIUM 40 MILLIGRAM(S): 20 TABLET, DELAYED RELEASE ORAL at 05:57

## 2019-03-20 RX ADMIN — Medication 650 MILLIGRAM(S): at 12:13

## 2019-03-20 RX ADMIN — NYSTATIN CREAM 1 APPLICATION(S): 100000 CREAM TOPICAL at 05:58

## 2019-03-20 RX ADMIN — NYSTATIN CREAM 1 APPLICATION(S): 100000 CREAM TOPICAL at 17:23

## 2019-03-20 RX ADMIN — FAMOTIDINE 104 MILLIGRAM(S): 10 INJECTION INTRAVENOUS at 17:22

## 2019-03-20 RX ADMIN — ZINC OXIDE 1 APPLICATION(S): 200 OINTMENT TOPICAL at 05:58

## 2019-03-20 RX ADMIN — ANASTROZOLE 1 MILLIGRAM(S): 1 TABLET ORAL at 12:15

## 2019-03-20 RX ADMIN — ZINC OXIDE 1 APPLICATION(S): 200 OINTMENT TOPICAL at 21:41

## 2019-03-20 RX ADMIN — Medication 2: at 07:05

## 2019-03-20 RX ADMIN — ENOXAPARIN SODIUM 40 MILLIGRAM(S): 100 INJECTION SUBCUTANEOUS at 12:14

## 2019-03-20 RX ADMIN — CHLORHEXIDINE GLUCONATE 1 APPLICATION(S): 213 SOLUTION TOPICAL at 05:58

## 2019-03-20 RX ADMIN — Medication 650 MILLIGRAM(S): at 04:40

## 2019-03-20 RX ADMIN — Medication 1 EACH: at 19:34

## 2019-03-20 RX ADMIN — Medication 650 MILLIGRAM(S): at 13:41

## 2019-03-20 RX ADMIN — ZINC OXIDE 1 APPLICATION(S): 200 OINTMENT TOPICAL at 13:42

## 2019-03-20 RX ADMIN — FAMOTIDINE 104 MILLIGRAM(S): 10 INJECTION INTRAVENOUS at 05:57

## 2019-03-20 RX ADMIN — Medication 4: at 17:21

## 2019-03-20 RX ADMIN — Medication 2: at 00:27

## 2019-03-20 RX ADMIN — I.V. FAT EMULSION 20.83 GRAM(S): 20 EMULSION INTRAVENOUS at 21:41

## 2019-03-20 NOTE — PROGRESS NOTE ADULT - ASSESSMENT
60yo F with stage IV endometrial adenocarcinoma s/p staging surgery '18 and 1 round of chemotherapy 2/19 admitted for management of symptomatic rectovaginal and enterovaginal fistulas, now with healing complex fistula associated bacteruria, hemodynamically stable and afebrile, joseph in place for overflow incontinence, re-started on anastrozole for treatment (3/16-).    1. ID: urine culture from 2/27 positive for ESBL E. coli, associated due to complex fistula, s/p bactrim DS BID on 3/1-3/8, repeat urine cx 3/6 w/ MRSA and ESBL proteus resistant to TMP-SMX  - ID reconsulted for fever 3/12, recommend 7 day course of IV vanc and ertepenem, then no oral antibiotics needed, then wait 72 hours then re-culture urine to assess "contact" status.  discontinued IV ABX 3/18 as course was completed, consider re-culturing tomorrow or the next day.  2. FEN/GI - clears, IVH, replete electrolytes PRN, octreotide daily on TPN, PICC placed 2/27, electrolytes repleted via TPN, lipids every other day  3. PAIN - overall well controlled, OPM and IV prn, occasional abdominal and knee pain  4.  - joseph placed given urinary retention 3/16; Urology consulted 3/18, recommend continuing joseph, could transition to intermittent catheterization, urodynamics as an outpatient.  - no current active leaking of stool  - continue with nystatin powder and ointment for mons and labial irritation as well as zinc oxide  - careful monitoring urine output to monitor for signs of post obstructive diuresis  - CT abdomen/pelvis w/ IV and oral contrast performed 3/15 showed improved enterovaginal fistula, no longer contrast in vagina  - continue with anastrozole for treatment   5. Endocrine- ISS, holding home metformin at this time  6. RESP - Saturating well on room air, encouraged ISS  7. VTE prophylaxis - SCDs, ambulate as tolerated with PT, Lovenox 40qd, PT    8. Palliative: palliative consulted, appreciate recommendations  - pt no longer desires Lexapro 10mg qhs   9. gyn malignancy  - continue anastrozole for treatment  - consider discharge to [facility] to increase patient's perforance status

## 2019-03-20 NOTE — PROGRESS NOTE ADULT - SUBJECTIVE AND OBJECTIVE BOX
GYN PROGRESS NOTE PGY4    Patient evaluated at the bedside. She is sleeping. Complained of some knee pain overnight which resolved with tylenol. Ostomy bag changed with 75cc output.   Yesterday discussed that patient could be placed at [facility] [place] pending discharge. Unclear discharge date at this time. Saw Urology, recommend joseph catheter, and could transition to intermittent catheterization if needed. saw PICC team - PICC adjusted, working.    T(C): 36.2 (03-20-19 @ 05:59), Max: 36.9 (03-19-19 @ 09:10)  HR: 80 (03-20-19 @ 05:59) (77 - 82)  BP: 120/80 (03-20-19 @ 05:59) (120/80 - 129/84)  RR: 16 (03-20-19 @ 05:59) (16 - 18)  SpO2: 97% (03-20-19 @ 05:59) (97% - 100%)    GEN: patient appears well, resting        03-18 @ 07:01  -  03-19 @ 07:00  --------------------------------------------------------  IN: 2561.8 mL / OUT: 4625 mL / NET: -2063.2 mL    03-19 @ 07:01  -  03-20 @ 06:19  --------------------------------------------------------  IN: 1028 mL / OUT: 3375 mL / NET: -2347 mL                            7.2    6.17  )-----------( 222      ( 19 Mar 2019 08:04 )             23.5     03-19    141  |  104  |  17  ----------------------------<  114<H>  4.2   |  29  |  0.50    Ca    9.1      19 Mar 2019 08:04  Phos  3.8     03-19  Mg     1.8     03-19    TPro  6.1  /  Alb  3.1<L>  /  TBili  0.2  /  DBili  x   /  AST  12  /  ALT  16  /  AlkPhos  120  03-19      MEDICATIONS  (STANDING):  anastrozole 1 milliGRAM(s) Oral daily  chlorhexidine 2% Cloths 1 Application(s) Topical <User Schedule>  dextrose 50% Injectable 25 Gram(s) IV Push once  enoxaparin Injectable 40 milliGRAM(s) SubCutaneous daily  famotidine  IVPB 20 milliGRAM(s) IV Intermittent two times a day  insulin lispro (HumaLOG) corrective regimen sliding scale   SubCutaneous every 6 hours  nystatin Powder 1 Application(s) Topical two times a day  pantoprazole  Injectable 40 milliGRAM(s) IV Push every 24 hours  Parenteral Nutrition - Adult 1 Each (83 mL/Hr) TPN Continuous <Continuous>  zinc oxide 20% Ointment 1 Application(s) Topical three times a day    MEDICATIONS  (PRN):  acetaminophen   Tablet .. 650 milliGRAM(s) Oral every 6 hours PRN Temp greater or equal to 38C (100.4F), Mild Pain (1 - 3)  glucagon  Injectable 1 milliGRAM(s) IntraMuscular once PRN Glucose LESS THAN 70 milligrams/deciliter  sodium chloride 0.9% lock flush 10 milliLiter(s) IV Push every 1 hour PRN Pre/post blood products, medications, blood draw, and to maintain line patency

## 2019-03-20 NOTE — PROGRESS NOTE ADULT - ASSESSMENT
60yo F with stage IV endometrial adenocarcinoma s/p staging surgery '18 and 1 round of chemotherapy 2/19 admitted for management of symptomatic rectovaginal and enterovaginal fistulas, now with healing complex fistula associated bacteruria, hemodynamically stable and afebrile, joseph in place for overflow incontinence, re-started on anastrozole for treatment (3/16-).    1. ID: urine culture from 2/27 positive for ESBL E. coli, associated due to complex fistula, s/p bactrim DS BID on 3/1-3/8, repeat urine cx 3/6 w/ MRSA and ESBL proteus resistant to TMP-SMX  - ID reconsulted for fever 3/12, recommend 7 day course of IV vanc and ertepenem, then no oral antibiotics needed, then wait 72 hours then re-culture urine to assess "contact" status. discontinued IV ABX 3/18 as course was completed, consider re-culturing tomorrow or the next day.  2. FEN/GI - clears, IVH, replete electrolytes PRN, octreotide daily on TPN, PICC placed 2/27, electrolytes repleted via TPN, lipids every other day  3. PAIN - overall well controlled, OPM and IV prn, occasional abdominal and knee pain  4.  - joseph placed given urinary retention 3/16; Urology consulted 3/18, recommend continuing joseph, could transition to intermittent catheterization, urodynamics as an outpatient.  - no current active leaking of stool  - continue with nystatin powder and ointment for mons and labial irritation as well as zinc oxide  - careful monitoring urine output to monitor for signs of post obstructive diuresis  - CT abdomen/pelvis w/ IV and oral contrast performed 3/15 showed improved enterovaginal fistula, no longer contrast in vagina  - continue with anastrozole for treatment   5. Endocrine- ISS, holding home metformin at this time  6. RESP - Saturating well on room air, encouraged ISS  7. VTE prophylaxis - SCDs, ambulate as tolerated with PT, Lovenox 40qd, PT    8. Palliative: palliative consulted, appreciate recommendations  - pt no longer desires Lexapro 10mg qhs   9. gyn malignancy  - continue anastrozole for treatment  - consider discharge to [facility] [place] to increase patient's performance status

## 2019-03-20 NOTE — PROGRESS NOTE ADULT - SUBJECTIVE AND OBJECTIVE BOX
Pt evaluated at bedside. she notes mild pain in her left knee otherwise denies abdominal pain. She is tolerating clear liquid diet, denies nausea/vomiting. denies leakage of stool. She has not ambulated today.     T(C): 36.2 (03-20-19 @ 13:41), Max: 36.2 (03-20-19 @ 05:59)  HR: 82 (03-20-19 @ 13:41) (80 - 83)  BP: 129/80 (03-20-19 @ 13:41) (120/80 - 129/84)  RR: 17 (03-20-19 @ 13:41) (16 - 17)  SpO2: 99% (03-20-19 @ 13:41) (97% - 99%)  GA: NAD  Pulm: nonlabored breathing  Abd: soft, NTND, no rebound or guarding, ostomy intact, well perfused, with output    03-19 @ 07:01  -  03-20 @ 07:00  --------------------------------------------------------  IN: 1526 mL / OUT: 3450 mL / NET: -1924 mL    03-20 @ 07:01  -  03-20 @ 16:32  --------------------------------------------------------  IN: 1310 mL / OUT: 2400 mL / NET: -1090 mL                              7.6    5.39  )-----------( 222      ( 20 Mar 2019 10:13 )             24.6     03-20    142  |  103  |  18  ----------------------------<  108<H>  4.6   |  30  |  0.54    Ca    9.5      20 Mar 2019 10:13  Phos  4.7     03-20  Mg     2.0     03-20    TPro  6.1  /  Alb  3.1<L>  /  TBili  0.2  /  DBili  x   /  AST  12  /  ALT  16  /  AlkPhos  120  03-19

## 2019-03-21 LAB
ALBUMIN SERPL ELPH-MCNC: 3.2 G/DL — LOW (ref 3.3–5)
ALP SERPL-CCNC: 126 U/L — HIGH (ref 40–120)
ALT FLD-CCNC: 17 U/L — SIGNIFICANT CHANGE UP (ref 10–45)
ANION GAP SERPL CALC-SCNC: 13 MMOL/L — SIGNIFICANT CHANGE UP (ref 5–17)
AST SERPL-CCNC: 15 U/L — SIGNIFICANT CHANGE UP (ref 10–40)
BASOPHILS # BLD AUTO: 0.01 K/UL — SIGNIFICANT CHANGE UP (ref 0–0.2)
BASOPHILS NFR BLD AUTO: 0.2 % — SIGNIFICANT CHANGE UP (ref 0–2)
BILIRUB SERPL-MCNC: <0.2 MG/DL — SIGNIFICANT CHANGE UP (ref 0.2–1.2)
BUN SERPL-MCNC: 18 MG/DL — SIGNIFICANT CHANGE UP (ref 7–23)
CALCIUM SERPL-MCNC: 9.8 MG/DL — SIGNIFICANT CHANGE UP (ref 8.4–10.5)
CHLORIDE SERPL-SCNC: 99 MMOL/L — SIGNIFICANT CHANGE UP (ref 96–108)
CHOLEST SERPL-MCNC: 104 MG/DL — SIGNIFICANT CHANGE UP (ref 10–199)
CO2 SERPL-SCNC: 28 MMOL/L — SIGNIFICANT CHANGE UP (ref 22–31)
CREAT ?TM UR-MCNC: 10 MG/DL — SIGNIFICANT CHANGE UP
CREAT SERPL-MCNC: 0.54 MG/DL — SIGNIFICANT CHANGE UP (ref 0.5–1.3)
EOSINOPHIL # BLD AUTO: 0.36 K/UL — SIGNIFICANT CHANGE UP (ref 0–0.5)
EOSINOPHIL NFR BLD AUTO: 6.3 % — HIGH (ref 0–6)
GLUCOSE BLDC GLUCOMTR-MCNC: 124 MG/DL — HIGH (ref 70–99)
GLUCOSE BLDC GLUCOMTR-MCNC: 130 MG/DL — HIGH (ref 70–99)
GLUCOSE BLDC GLUCOMTR-MCNC: 154 MG/DL — HIGH (ref 70–99)
GLUCOSE BLDC GLUCOMTR-MCNC: 159 MG/DL — HIGH (ref 70–99)
GLUCOSE BLDC GLUCOMTR-MCNC: 189 MG/DL — HIGH (ref 70–99)
GLUCOSE SERPL-MCNC: 193 MG/DL — HIGH (ref 70–99)
HCT VFR BLD CALC: 28.1 % — LOW (ref 34.5–45)
HDLC SERPL-MCNC: 22 MG/DL — LOW
HGB BLD-MCNC: 8.5 G/DL — LOW (ref 11.5–15.5)
IMM GRANULOCYTES NFR BLD AUTO: 0.5 % — SIGNIFICANT CHANGE UP (ref 0–1.5)
LIPID PNL WITH DIRECT LDL SERPL: 25 MG/DL — SIGNIFICANT CHANGE UP
LYMPHOCYTES # BLD AUTO: 1.35 K/UL — SIGNIFICANT CHANGE UP (ref 1–3.3)
LYMPHOCYTES # BLD AUTO: 23.6 % — SIGNIFICANT CHANGE UP (ref 13–44)
MAGNESIUM SERPL-MCNC: 2 MG/DL — SIGNIFICANT CHANGE UP (ref 1.6–2.6)
MCHC RBC-ENTMCNC: 30.1 PG — SIGNIFICANT CHANGE UP (ref 27–34)
MCHC RBC-ENTMCNC: 30.2 GM/DL — LOW (ref 32–36)
MCV RBC AUTO: 99.6 FL — SIGNIFICANT CHANGE UP (ref 80–100)
MONOCYTES # BLD AUTO: 0.57 K/UL — SIGNIFICANT CHANGE UP (ref 0–0.9)
MONOCYTES NFR BLD AUTO: 10 % — SIGNIFICANT CHANGE UP (ref 2–14)
NEUTROPHILS # BLD AUTO: 3.39 K/UL — SIGNIFICANT CHANGE UP (ref 1.8–7.4)
NEUTROPHILS NFR BLD AUTO: 59.4 % — SIGNIFICANT CHANGE UP (ref 43–77)
NRBC # BLD: 0 /100 WBCS — SIGNIFICANT CHANGE UP (ref 0–0)
OSMOLALITY UR: 295 MOSMOL/KG — SIGNIFICANT CHANGE UP (ref 100–650)
PHOSPHATE SERPL-MCNC: 4.8 MG/DL — HIGH (ref 2.5–4.5)
PLATELET # BLD AUTO: 251 K/UL — SIGNIFICANT CHANGE UP (ref 150–400)
POTASSIUM SERPL-MCNC: 4.6 MMOL/L — SIGNIFICANT CHANGE UP (ref 3.5–5.3)
POTASSIUM SERPL-SCNC: 4.6 MMOL/L — SIGNIFICANT CHANGE UP (ref 3.5–5.3)
PROT SERPL-MCNC: 7.1 G/DL — SIGNIFICANT CHANGE UP (ref 6–8.3)
RBC # BLD: 2.82 M/UL — LOW (ref 3.8–5.2)
RBC # FLD: 17.1 % — HIGH (ref 10.3–14.5)
SODIUM SERPL-SCNC: 140 MMOL/L — SIGNIFICANT CHANGE UP (ref 135–145)
SODIUM UR-SCNC: 106 MMOL/L — SIGNIFICANT CHANGE UP
TOTAL CHOLESTEROL/HDL RATIO MEASUREMENT: 4.7 RATIO — SIGNIFICANT CHANGE UP (ref 3.3–7.1)
TRIGL SERPL-MCNC: 284 MG/DL — HIGH (ref 10–149)
WBC # BLD: 5.71 K/UL — SIGNIFICANT CHANGE UP (ref 3.8–10.5)
WBC # FLD AUTO: 5.71 K/UL — SIGNIFICANT CHANGE UP (ref 3.8–10.5)

## 2019-03-21 PROCEDURE — 99233 SBSQ HOSP IP/OBS HIGH 50: CPT

## 2019-03-21 RX ORDER — ELECTROLYTE SOLUTION,INJ
1 VIAL (ML) INTRAVENOUS
Qty: 0 | Refills: 0 | Status: DISCONTINUED | OUTPATIENT
Start: 2019-03-21 | End: 2019-03-21

## 2019-03-21 RX ADMIN — CHLORHEXIDINE GLUCONATE 1 APPLICATION(S): 213 SOLUTION TOPICAL at 05:34

## 2019-03-21 RX ADMIN — Medication 2: at 06:50

## 2019-03-21 RX ADMIN — FAMOTIDINE 104 MILLIGRAM(S): 10 INJECTION INTRAVENOUS at 05:33

## 2019-03-21 RX ADMIN — NYSTATIN CREAM 1 APPLICATION(S): 100000 CREAM TOPICAL at 05:34

## 2019-03-21 RX ADMIN — ZINC OXIDE 1 APPLICATION(S): 200 OINTMENT TOPICAL at 22:06

## 2019-03-21 RX ADMIN — NYSTATIN CREAM 1 APPLICATION(S): 100000 CREAM TOPICAL at 18:56

## 2019-03-21 RX ADMIN — Medication 2: at 12:27

## 2019-03-21 RX ADMIN — PANTOPRAZOLE SODIUM 40 MILLIGRAM(S): 20 TABLET, DELAYED RELEASE ORAL at 05:33

## 2019-03-21 RX ADMIN — FAMOTIDINE 104 MILLIGRAM(S): 10 INJECTION INTRAVENOUS at 18:42

## 2019-03-21 RX ADMIN — ZINC OXIDE 1 APPLICATION(S): 200 OINTMENT TOPICAL at 15:00

## 2019-03-21 RX ADMIN — ANASTROZOLE 1 MILLIGRAM(S): 1 TABLET ORAL at 12:32

## 2019-03-21 RX ADMIN — ZINC OXIDE 1 APPLICATION(S): 200 OINTMENT TOPICAL at 05:33

## 2019-03-21 RX ADMIN — Medication 2: at 22:06

## 2019-03-21 RX ADMIN — Medication 1 EACH: at 19:01

## 2019-03-21 RX ADMIN — ENOXAPARIN SODIUM 40 MILLIGRAM(S): 100 INJECTION SUBCUTANEOUS at 12:24

## 2019-03-21 NOTE — CONSULT NOTE ADULT - CONSULT REASON
Excess urination, concern for DI
Symptom management
TPN guidance
endometrial adenocarcinoma
rectovaginal fistula
urinary retention
evaluation for PICC line placement
Ucx + MRSA and ESBL

## 2019-03-21 NOTE — PROGRESS NOTE ADULT - SUBJECTIVE AND OBJECTIVE BOX
GYN PROGRESS NOTE PGY4    Patient evaluated at the bedside. No acute events. She is in the chair, about to walk with PT. Discussed importance of moving to avoid pressure ulcers. Currently, it is blanchable area of redness on sacrum). The TPN ran all day therefore at 5pm we will start the cyclic TPN which will end at 5am. per endocrinology recommendations (consulted for copious urine output), will fluid restrict in AM and initiate work up for diabetes insipidus, suspicion is low. Patient has no complaints. She says the vaginal irritation is significantly improved and may not need the topical creams because she no longer has the risk factors of stool per vagina and the primafit. joseph in place. urine culture sent from joseph this afternoon.     T(C): 36.8 (03-21-19 @ 16:01), Max: 36.8 (03-21-19 @ 16:01)  HR: 86 (03-21-19 @ 16:01) (74 - 89)  BP: 117/85 (03-21-19 @ 16:01) (111/77 - 141/91)  RR: 17 (03-21-19 @ 16:01) (16 - 17)  SpO2: 100% (03-21-19 @ 16:01) (96% - 100%)    GEN: patient appears well  LUNGS: no respiratory distress  ABD: soft, non tender *sacrum assessed at 9am, area of redness and irritation in midline, no breakdown  EXT: no calf tenderness        03-20 @ 07:01  -  03-21 @ 07:00  --------------------------------------------------------  IN: 2659.2 mL / OUT: 5175 mL / NET: -2515.8 mL    03-21 @ 07:01  -  03-21 @ 16:53  --------------------------------------------------------  IN: 480 mL / OUT: 1300 mL / NET: -820 mL

## 2019-03-21 NOTE — PROGRESS NOTE ADULT - SUBJECTIVE AND OBJECTIVE BOX
GYN PROGRESS NOTE PGY4    Patient evaluated at the bedside.  Ostomy just changed. She is on concentrated TPN overnight (the same amount but being run at a faster rate), tolerating well. She is sleeping and does not want to be woken at this time.    T(C): 35.6 (03-21-19 @ 05:35), Max: 36.7 (03-20-19 @ 16:48)  HR: 74 (03-21-19 @ 05:35) (74 - 83)  BP: 129/82 (03-21-19 @ 05:35) (123/74 - 141/91)  RR: 16 (03-21-19 @ 05:35) (16 - 18)  SpO2: 96% (03-21-19 @ 05:35) (96% - 99%)    NOT EXAMINED                        7.6    5.39  )-----------( 222      ( 20 Mar 2019 10:13 )             24.6     03-20    142  |  103  |  18  ----------------------------<  108<H>  4.6   |  30  |  0.54    Ca    9.5      20 Mar 2019 10:13  Phos  4.7     03-20  Mg     2.0     03-20    TPro  6.1  /  Alb  3.1<L>  /  TBili  0.2  /  DBili  x   /  AST  12  /  ALT  16  /  AlkPhos  120  03-19 03-19 @ 07:01  -  03-20 @ 07:00  --------------------------------------------------------  IN: 1526 mL / OUT: 3450 mL / NET: -1924 mL    03-20 @ 07:01  -  03-21 @ 06:10  --------------------------------------------------------  IN: 2555.4 mL / OUT: 5175 mL / NET: -2619.6 mL      MEDICATIONS  (STANDING):  anastrozole 1 milliGRAM(s) Oral daily  chlorhexidine 2% Cloths 1 Application(s) Topical <User Schedule>  dextrose 50% Injectable 25 Gram(s) IV Push once  enoxaparin Injectable 40 milliGRAM(s) SubCutaneous daily  famotidine  IVPB 20 milliGRAM(s) IV Intermittent two times a day  insulin lispro (HumaLOG) corrective regimen sliding scale   SubCutaneous every 6 hours  nystatin Powder 1 Application(s) Topical two times a day  pantoprazole  Injectable 40 milliGRAM(s) IV Push every 24 hours  Parenteral Nutrition - Adult 1 Each TPN Continuous <Continuous>  zinc oxide 20% Ointment 1 Application(s) Topical three times a day    MEDICATIONS  (PRN):  acetaminophen   Tablet .. 650 milliGRAM(s) Oral every 6 hours PRN Temp greater or equal to 38C (100.4F), Mild Pain (1 - 3)  glucagon  Injectable 1 milliGRAM(s) IntraMuscular once PRN Glucose LESS THAN 70 milligrams/deciliter  ibuprofen  Suspension. 600 milliGRAM(s) Oral every 6 hours PRN Moderate Pain (4 - 6)  sodium chloride 0.9% lock flush 10 milliLiter(s) IV Push every 1 hour PRN Pre/post blood products, medications, blood draw, and to maintain line patency

## 2019-03-21 NOTE — PROGRESS NOTE ADULT - ASSESSMENT
58yo F with stage IV endometrial adenocarcinoma s/p staging surgery '18 and 1 round of chemotherapy 2/19 admitted for management of symptomatic rectovaginal and enterovaginal fistulas, now with healing complex fistula associated bacteruria, hemodynamically stable and afebrile, joseph in place for overflow incontinence, re-started on anastrozole for treatment (3/16-).    1. ID: urine culture from 2/27 positive for ESBL E. coli, associated due to complex fistula, s/p bactrim DS BID on 3/1-3/8, repeat urine cx 3/6 w/ MRSA and ESBL proteus resistant to TMP-SMX  - ID reconsulted for fever 3/12, recommend 7 day course of IV vanc and ertepenem, then no oral antibiotics needed, then wait 72 hours then re-culture urine to assess "contact" status. discontinued IV ABX 3/18 as course was completed, re-culture urine today (sent)  2. FEN/GI - clears, IVH, replete electrolytes PRN, octreotide daily on TPN, PICC placed 2/27, electrolytes repleted via TPN, lipids every other day, plan to run TPN over 12 hours, but the bag ran for the 24 hours today therefore will start the 12 hour TPN tonight, spoke with pharmacist, will monitor patient for response  3. PAIN - overall well controlled, OPM and IV prn, occasional abdominal and knee pain  4.  - joseph placed given urinary retention 3/16; Urology consulted 3/18, recommend continuing joseph, could transition to intermittent catheterization, urodynamics as an outpatient.  - no current active leaking of stool  - continue with nystatin powder and ointment for mons and labial irritation as well as zinc oxide  - careful monitoring urine output to monitor for signs of post obstructive diuresis  - CT abdomen/pelvis w/ IV and oral contrast performed 3/15 showed improved enterovaginal fistula, no longer contrast in vagina  - continue with anastrozole for treatment   5. Endocrine- ISS, holding home metformin at this time, glucose in TPN decreased slightly yesterday, monitor FS  Endocrine consulted today to evaluate high clear urine output. Low suspicion for diabetes insipidus, but recommend work up: at 5am when overnight TPN is completed, draw baseline labs: BMP, serum osm, urine osm. THEN obtain these labs every 4 hours for 12 hours. With the first blood draw, get a Vitamin D serum level. Patient must be completely fluid restricted during this time. They also recommend changing patient to diabetic clears which was done. Will be NPO at 5am.  6. RESP - Saturating well on room air, encouraged ISS  7. VTE prophylaxis - SCDs, ambulate as tolerated with PT, Lovenox 40qd, PT    8. Palliative: palliative consulted, appreciate recommendations  - pt no longer desires Lexapro 10mg qhs   9. gyn malignancy  - continue anastrozole for treatment  - consider discharge to [facility] to increase patient's performance status

## 2019-03-21 NOTE — CONSULT NOTE ADULT - ATTENDING COMMENTS
Pt seen and examined at bedside.   Agree with above   In brief, pt is 58 yo F with hx of Breast CA, Endometerial CA, colostomy, recent dx of rectovaginal, enterovaginal fistula at outside hospital transferred to [facility] for further evaluation.  On evaluation, no surgical intervention was thought to be indicated and bowel rest for healing of the fistula was initiated.  Pt was started on TPN via left upper extremity PICC on 2/26 and was more recently started on liquid diet.  She is planned to continue TPN while rectovaginal fistula is resolving.   Pt recently developed increased urinary volume (has joseph in place) with no significant change in glucose levels, calcium, or other solutes.  She reports episodes of increased urination and frequency in the past which resolved spontaneously. No hx of head trauma, no hx of pituitary lesions, headaches, changes in vision, abnormal TFTs.    Electrolyte levels do not suggest a diagnosis of diabetes insipidus, however electrolyte content of TPN can be adjusted such that changes in elecrolytes would not necessarily be reflective of DI.   As such,  would recommend water deprivation test for evaluation of polyuria.   In discussion with GYN team, would prefer pt to start water deprivation test after completing current TPN order.   When pt is ready to begin please collect urine osm, plasma osm, plasma sodium, serum adh.   Pt should then have urine osms collected every 1 hour, and plasma osm and sodium collected every 2 hours until urine osms exceed 600 or urine osms are stable over 3 hours with increasing plasma osms or plasma osms > 295 and sodium  > 146.  At that time, repeat plasma adh and administer 10mcg nasal spray ddavp.  Collect urine volume and osms every 30 minutes for 2 hours.   TPN orders reviewed - pt has kcal goal of 1800/day.  This should include ~90g protein and 30-60g fat daily.  Careful monitoring of electrolytes if there is high colostomy output and micronutrient monitoring including chromium, selenium, thiamine, folate, copper, zinc, vitamin C.   Pt has had relatively stable glycemic control, can continue moderate dose scale. glucose goal is 100-180  Will continue to follow.

## 2019-03-21 NOTE — PROGRESS NOTE ADULT - ASSESSMENT
58yo F with stage IV endometrial adenocarcinoma s/p staging surgery '18 and 1 round of chemotherapy 2/19 admitted for management of symptomatic rectovaginal and enterovaginal fistulas, now with healing complex fistula associated bacteruria, hemodynamically stable and afebrile, joseph in place for overflow incontinence, re-started on anastrozole for treatment (3/16-).    1. ID: urine culture from 2/27 positive for ESBL E. coli, associated due to complex fistula, s/p bactrim DS BID on 3/1-3/8, repeat urine cx 3/6 w/ MRSA and ESBL proteus resistant to TMP-SMX  - ID reconsulted for fever 3/12, recommend 7 day course of IV vanc and ertepenem, then no oral antibiotics needed, then wait 72 hours then re-culture urine to assess "contact" status. discontinued IV ABX 3/18 as course was completed, re-culture urine today.  2. FEN/GI - clears, IVH, replete electrolytes PRN, octreotide daily on TPN, PICC placed 2/27, electrolytes repleted via TPN, lipids every other day, TPN being run over 12 hours now starting 3/20, will follow patient to see response  3. PAIN - overall well controlled, OPM and IV prn, occasional abdominal and knee pain  4.  - joseph placed given urinary retention 3/16; Urology consulted 3/18, recommend continuing joseph, could transition to intermittent catheterization, urodynamics as an outpatient.  - no current active leaking of stool  - continue with nystatin powder and ointment for mons and labial irritation as well as zinc oxide  - careful monitoring urine output to monitor for signs of post obstructive diuresis  - CT abdomen/pelvis w/ IV and oral contrast performed 3/15 showed improved enterovaginal fistula, no longer contrast in vagina  - continue with anastrozole for treatment   5. Endocrine- ISS, holding home metformin at this time, glucose in TPN decreased slightly yesterday, monitor FS  6. RESP - Saturating well on room air, encouraged ISS  7. VTE prophylaxis - SCDs, ambulate as tolerated with PT, Lovenox 40qd, PT    8. Palliative: palliative consulted, appreciate recommendations  - pt no longer desires Lexapro 10mg qhs   9. gyn malignancy  - continue anastrozole for treatment  - consider discharge to [facility] to increase patient's performance status

## 2019-03-21 NOTE — CHART NOTE - NSCHARTNOTEFT_GEN_A_CORE
Admitting Diagnosis:   Patient is a 59y old  Female who presents with a chief complaint of Rectovaginal and enterovaginal fistulas (06 Mar 2019 11:51)      PAST MEDICAL & SURGICAL HISTORY:      Current Nutrition Order:  Clear liquid diet as of 3/14  TPN via PICC:  200g Dex, 79g AA, 0g 205 lipids x12 hours (996kcal, 1.65g pro/kg IBW, GIR 2.25g)  Noted, TPN rate did not double so pt is getting half of intended nutrition.  **See PN recs below.     PO Intake: Good (%) [   ]  Fair (50-75%) [   ] Poor (<25%) [ x  ]   pt enjoying jello and icees, inquiring when diet can advance to more solid foods    GI Issues:   LLQ colostomy  Small liquid output, no stool leaking.  Denies N/V after drinking fluids    Pain:  No pain reported     Skin Integrity:  IAD at perirenal area  Colostomy in place  carmen score 17      Labs:       140  |  99  |  18  ----------------------------<  193<H>  4.6   |  28  |  0.54    Ca    9.8      21 Mar 2019 09:56  Phos  4.8       Mg     2.0         TPro  7.1  /  Alb  3.2<L>  /  TBili  <0.2  /  DBili  x   /  AST  15  /  ALT  17  /  AlkPhos  126<H>      CAPILLARY BLOOD GLUCOSE      POCT Blood Glucose.: 159 mg/dL (21 Mar 2019 12:15)  POCT Blood Glucose.: 154 mg/dL (21 Mar 2019 06:18)  POCT Blood Glucose.: 130 mg/dL (20 Mar 2019 23:59)  POCT Blood Glucose.: 241 mg/dL (20 Mar 2019 16:49)      Medications:  MEDICATIONS  (STANDING):  anastrozole 1 milliGRAM(s) Oral daily  chlorhexidine 2% Cloths 1 Application(s) Topical <User Schedule>  dextrose 50% Injectable 25 Gram(s) IV Push once  enoxaparin Injectable 40 milliGRAM(s) SubCutaneous daily  famotidine  IVPB 20 milliGRAM(s) IV Intermittent two times a day  insulin lispro (HumaLOG) corrective regimen sliding scale   SubCutaneous every 6 hours  nystatin Powder 1 Application(s) Topical two times a day  pantoprazole  Injectable 40 milliGRAM(s) IV Push every 24 hours  Parenteral Nutrition - Adult 1 Each TPN Continuous <Continuous>  Parenteral Nutrition - Adult 1 Each TPN Continuous <Continuous>  zinc oxide 20% Ointment 1 Application(s) Topical three times a day    MEDICATIONS  (PRN):  acetaminophen   Tablet .. 650 milliGRAM(s) Oral every 6 hours PRN Temp greater or equal to 38C (100.4F), Mild Pain (1 - 3)  glucagon  Injectable 1 milliGRAM(s) IntraMuscular once PRN Glucose LESS THAN 70 milligrams/deciliter  ibuprofen  Suspension. 600 milliGRAM(s) Oral every 6 hours PRN Moderate Pain (4 - 6)  sodium chloride 0.9% lock flush 10 milliLiter(s) IV Push every 1 hour PRN Pre/post blood products, medications, blood draw, and to maintain line patency      Weight: 136lbs  Height: 5'1", IBW 105lbs+/-10%, %%, BMI 25.7  Daily     Weight Change:   Denies any recent wt changes. Good appetite PTA.  Please trend weights while on TPN   Obtained bedscale weight today 3/21: 80.6kg- suspect scale inaccuracy    Estimated energy needs:   IBW used for calculations as pt >120% of IBW   Nutrient needs based on [facility] standards of care for maintenance in adults.   Needs adjusted 2/2 catabolic illness, chemo treatments. Fluids adjusted 2/2 colostomy.  1428-1666kcal/day (30-35kcal/kg)  57-67g pro/day (1.2-1.4g/kg)  1428-1666ml fluid/day (30-35ml/kg)    Subjective:   60yo F with stage IV endometrial adenocarcinoma s/p staging surgery ' and 1 round of chemotherapy 3 wks ago. Was planned to complete 2nd cycle of chemo today . Admitted for management of symptomatic rectovaginal and enterovaginal fistulas. Now +MRSA. Per surgery, no surgical intervention at this time. Ordered for octreotide. PICC line placed (). Pt now receiving cyclic TPN (x12hrs). Pt currently ordered for  200g Dex, 79g AA, 0g 205 lipids x12 hours (996kcal, 1.65g pro/kg IBW, GIR 2.25g). **Noted, rate was not doubled for 12 hour period so pt has only been receiving half of intended nutrition. Now also ordered for Clear Liquid diet (3/14). Pt seems to be tolerating well- enjoying icees and jello in particular. Pt inquired again about when diet could be advanced to solid foods- will f/u w/ team to see if there is plan for further advancement. Denies N/V, no abdominal comfort after drinking liquids. Small liquid ostomy output today per RN. POCT B, 154, 130, 241mg/dL; lytes WNL except Phos 4.8; Tmg/dL (3/21), 199 (3/10) 287mg/dL (3/6), 189mg/dL (3/4), 383mg/dL (3/3).  Please trend TG 2x/week and monitor need for 20g 20% lipids 4x/week vs 50g. PO diet advancement per team. D/w pt 12 hours infusions vs 24hrs. Pt remains understanding of meeting needs via TPN until diet is able to be advanced further. Please obtain new wt to assess adequacy of feeds. RD to follow.     Previous Nutrition Diagnosis:  increased nutrient needs RT increased demand for kcal/pro AEB catabolic illness, s/p chemo treatment and possible fistula losses.   Active [ x  ]  Resolved [   ]    If resolved, new PES:     Goal:  Pt to consistently meet % of estimated needs via most appropriate route    Recommendations:  1) For cyclic TPN, recommend 225g Dex, 72g AA, 50g 20% lipids x12 hrs (1536kcal, 1.5g pro/kg IBW, GIR 4.95g).   >>Please do 1/2 rate the first hour, whole rate following 10 hours and 1/2 rate the last hour to avoid any drops or spikes in blood glucose.   >>Recommend checking TG 2x/week. Check Mg, Phos, K daily and POC BG Q6hrs. Trend daily weights. Fluids and lytes per MD discretion.   2)  If TG >400mg/dL, consider switching to 20g 20% lipids vs 50g 4x/week. TG may increase further w/ increased infusion rate of lipids.  3) When PO is deemed medically feasible recommend advancing to Clear Liquid diet w/ EnsureClears TID (720kcal, 24g pro) >> Full liquids w/ EnsureEnlive TID (1050kcal, 60g pro).  4) Please continue w/ PN as primary form of nutrition support until pt is capable of meeting >60% EER PO  5) please obtain updated weight  *d/w team    Education:   pt understanding of meeting needs via TPN for time being. D/w pt 12 hour infusions vs 24hrs.     Risk Level: High [ x  ] Moderate [   ] Low [   ]. Admitting Diagnosis:   Patient is a 59y old  Female who presents with a chief complaint of Rectovaginal and enterovaginal fistulas (06 Mar 2019 11:51)      PAST MEDICAL & SURGICAL HISTORY:      Current Nutrition Order:  Clear liquid diet as of 3/14  TPN via PICC:  200g Dex, 79g AA, 0g 205 lipids x12 hours (996kcal, 1.65g pro/kg IBW, GIR 2.25g)  Noted, TPN rate did not double so pt is getting half of intended nutrition.  **See PN recs below.     PO Intake: Good (%) [   ]  Fair (50-75%) [   ] Poor (<25%) [ x  ]   pt enjoying jello and icees, inquiring when diet can advance to more solid foods    GI Issues:   LLQ colostomy  Small liquid output, no stool leaking.  Denies N/V after drinking fluids    Pain:  No pain reported     Skin Integrity:  IAD at perirenal area  Colostomy in place  carmen score 17      Labs:       140  |  99  |  18  ----------------------------<  193<H>  4.6   |  28  |  0.54    Ca    9.8      21 Mar 2019 09:56  Phos  4.8       Mg     2.0         TPro  7.1  /  Alb  3.2<L>  /  TBili  <0.2  /  DBili  x   /  AST  15  /  ALT  17  /  AlkPhos  126<H>      CAPILLARY BLOOD GLUCOSE      POCT Blood Glucose.: 159 mg/dL (21 Mar 2019 12:15)  POCT Blood Glucose.: 154 mg/dL (21 Mar 2019 06:18)  POCT Blood Glucose.: 130 mg/dL (20 Mar 2019 23:59)  POCT Blood Glucose.: 241 mg/dL (20 Mar 2019 16:49)      Medications:  MEDICATIONS  (STANDING):  anastrozole 1 milliGRAM(s) Oral daily  chlorhexidine 2% Cloths 1 Application(s) Topical <User Schedule>  dextrose 50% Injectable 25 Gram(s) IV Push once  enoxaparin Injectable 40 milliGRAM(s) SubCutaneous daily  famotidine  IVPB 20 milliGRAM(s) IV Intermittent two times a day  insulin lispro (HumaLOG) corrective regimen sliding scale   SubCutaneous every 6 hours  nystatin Powder 1 Application(s) Topical two times a day  pantoprazole  Injectable 40 milliGRAM(s) IV Push every 24 hours  Parenteral Nutrition - Adult 1 Each TPN Continuous <Continuous>  Parenteral Nutrition - Adult 1 Each TPN Continuous <Continuous>  zinc oxide 20% Ointment 1 Application(s) Topical three times a day    MEDICATIONS  (PRN):  acetaminophen   Tablet .. 650 milliGRAM(s) Oral every 6 hours PRN Temp greater or equal to 38C (100.4F), Mild Pain (1 - 3)  glucagon  Injectable 1 milliGRAM(s) IntraMuscular once PRN Glucose LESS THAN 70 milligrams/deciliter  ibuprofen  Suspension. 600 milliGRAM(s) Oral every 6 hours PRN Moderate Pain (4 - 6)  sodium chloride 0.9% lock flush 10 milliLiter(s) IV Push every 1 hour PRN Pre/post blood products, medications, blood draw, and to maintain line patency      Weight: 136lbs  Height: 5'1", IBW 105lbs+/-10%, %%, BMI 25.7  Daily     Weight Change:   Denies any recent wt changes. Good appetite PTA.  Please trend weights while on TPN   Obtained bedscale weight today 3/21: 80.6kg- suspect scale inaccuracy    Estimated energy needs:   IBW used for calculations as pt >120% of IBW   Nutrient needs based on [facility] standards of care for maintenance in adults.   Needs adjusted 2/2 catabolic illness, chemo treatments. Fluids adjusted 2/2 colostomy.  1428-1666kcal/day (30-35kcal/kg)  57-67g pro/day (1.2-1.4g/kg)  1428-1666ml fluid/day (30-35ml/kg)    Subjective:   60yo F with stage IV endometrial adenocarcinoma s/p staging surgery ' and 1 round of chemotherapy 3 wks ago. Was planned to complete 2nd cycle of chemo today . Admitted for management of symptomatic rectovaginal and enterovaginal fistulas. Now +MRSA. Per surgery, no surgical intervention at this time. Ordered for octreotide. PICC line placed (). Pt now receiving cyclic TPN (x12hrs). Pt currently ordered for  200g Dex, 79g AA, 0g 205 lipids x12 hours (996kcal, 1.65g pro/kg IBW, GIR 2.25g). **Noted, rate was not doubled for 12 hour period so pt has only been receiving half of intended nutrition. Now also ordered for Clear Liquid diet (3/14). Pt seems to be tolerating well- enjoying icees and jello in particular. Pt inquired again about when diet could be advanced to solid foods- will f/u w/ team to see if there is plan for further advancement. Denies N/V, no abdominal comfort after drinking liquids. Small liquid ostomy output today per RN. POCT B, 154, 130, 241mg/dL; lytes WNL except Phos 4.8; Tmg/dL (3/21), 199 (3/10) 287mg/dL (3/6), 189mg/dL (3/4), 383mg/dL (3/3).  Please trend TG 2x/week and monitor need for 20g 20% lipids 4x/week vs 50g. PO diet advancement per team. D/w pt 12 hours infusions vs 24hrs. Pt remains understanding of meeting needs via TPN until diet is able to be advanced further. Please obtain new wt to assess adequacy of feeds. RD to follow.     Previous Nutrition Diagnosis:  increased nutrient needs RT increased demand for kcal/pro AEB catabolic illness, s/p chemo treatment and possible fistula losses.   Active [ x  ]  Resolved [   ]    If resolved, new PES:     Goal:  Pt to consistently meet % of estimated needs via most appropriate route    Recommendations:  1) For cyclic TPN, recommend 225g Dex, 72g AA, 50g 20% lipids x12 hrs (1536kcal, 1.5g pro/kg IBW, GIR 4.95g).   >>Please do 1/2 rate the first hour, whole rate following 10 hours and 1/2 rate the last hour to avoid any drops or spikes in blood glucose.   >>Recommend checking TG 2x/week. Check Mg, Phos, K daily and POC BG Q6hrs. Trend daily weights. Fluids and lytes per MD discretion.   2)  If TG >400mg/dL, consider switching to 20g 20% lipids vs 50g 4x/week. TG may increase further w/ increased infusion rate of lipids.  3) When PO is deemed medically feasible recommend advancing to Clear Liquid diet w/ EnsureClears TID (720kcal, 24g pro) >> Full liquids w/ EnsureEnlive TID (1050kcal, 60g pro).  4) Please continue w/ PN as primary form of nutrition support until pt is capable of meeting >60% EER PO  5) please obtain updated weight  *team paged 4x today to discuss, will continue page.       Education:   pt understanding of meeting needs via TPN for time being. D/w pt 12 hour infusions vs 24hrs.     Risk Level: High [ x  ] Moderate [   ] Low [   ]. Admitting Diagnosis:   Patient is a 59y old  Female who presents with a chief complaint of Rectovaginal and enterovaginal fistulas (06 Mar 2019 11:51)      PAST MEDICAL & SURGICAL HISTORY:      Current Nutrition Order:  Clear liquid diet as of 3/14  TPN via PICC:  200g Dex, 79g AA, 0g 205 lipids x12 hours (996kcal, 1.65g pro/kg IBW, GIR 2.25g)  Noted, TPN rate did not double so pt is getting half of intended nutrition.  **See PN recs below.     PO Intake: Good (%) [   ]  Fair (50-75%) [   ] Poor (<25%) [ x  ]   pt enjoying jello and icees, inquiring when diet can advance to more solid foods    GI Issues:   LLQ colostomy  Small liquid output, no stool leaking.  Denies N/V after drinking fluids    Pain:  No pain reported     Skin Integrity:  IAD at perirenal area  Colostomy in place  carmen score 17      Labs:       140  |  99  |  18  ----------------------------<  193<H>  4.6   |  28  |  0.54    Ca    9.8      21 Mar 2019 09:56  Phos  4.8       Mg     2.0         TPro  7.1  /  Alb  3.2<L>  /  TBili  <0.2  /  DBili  x   /  AST  15  /  ALT  17  /  AlkPhos  126<H>      CAPILLARY BLOOD GLUCOSE      POCT Blood Glucose.: 159 mg/dL (21 Mar 2019 12:15)  POCT Blood Glucose.: 154 mg/dL (21 Mar 2019 06:18)  POCT Blood Glucose.: 130 mg/dL (20 Mar 2019 23:59)  POCT Blood Glucose.: 241 mg/dL (20 Mar 2019 16:49)      Medications:  MEDICATIONS  (STANDING):  anastrozole 1 milliGRAM(s) Oral daily  chlorhexidine 2% Cloths 1 Application(s) Topical <User Schedule>  dextrose 50% Injectable 25 Gram(s) IV Push once  enoxaparin Injectable 40 milliGRAM(s) SubCutaneous daily  famotidine  IVPB 20 milliGRAM(s) IV Intermittent two times a day  insulin lispro (HumaLOG) corrective regimen sliding scale   SubCutaneous every 6 hours  nystatin Powder 1 Application(s) Topical two times a day  pantoprazole  Injectable 40 milliGRAM(s) IV Push every 24 hours  Parenteral Nutrition - Adult 1 Each TPN Continuous <Continuous>  Parenteral Nutrition - Adult 1 Each TPN Continuous <Continuous>  zinc oxide 20% Ointment 1 Application(s) Topical three times a day    MEDICATIONS  (PRN):  acetaminophen   Tablet .. 650 milliGRAM(s) Oral every 6 hours PRN Temp greater or equal to 38C (100.4F), Mild Pain (1 - 3)  glucagon  Injectable 1 milliGRAM(s) IntraMuscular once PRN Glucose LESS THAN 70 milligrams/deciliter  ibuprofen  Suspension. 600 milliGRAM(s) Oral every 6 hours PRN Moderate Pain (4 - 6)  sodium chloride 0.9% lock flush 10 milliLiter(s) IV Push every 1 hour PRN Pre/post blood products, medications, blood draw, and to maintain line patency      Weight: 136lbs  Height: 5'1", IBW 105lbs+/-10%, %%, BMI 25.7  Daily     Weight Change:   Denies any recent wt changes. Good appetite PTA.  Please trend weights while on TPN   Obtained bedscale weight today 3/21: 80.6kg- suspect scale inaccuracy    Estimated energy needs:   IBW used for calculations as pt >120% of IBW   Nutrient needs based on [facility] standards of care for maintenance in adults.   Needs adjusted 2/2 catabolic illness, chemo treatments. Fluids adjusted 2/2 colostomy.  1428-1666kcal/day (30-35kcal/kg)  57-67g pro/day (1.2-1.4g/kg)  1428-1666ml fluid/day (30-35ml/kg)    Subjective:   58yo F with stage IV endometrial adenocarcinoma s/p staging surgery ' and 1 round of chemotherapy 3 wks ago. Was planned to complete 2nd cycle of chemo today . Admitted for management of symptomatic rectovaginal and enterovaginal fistulas. Now +MRSA. Per surgery, no surgical intervention at this time. Ordered for octreotide. PICC line placed (). Pt now receiving cyclic TPN (x12hrs). Pt currently ordered for  200g Dex, 79g AA, 0g 205 lipids x12 hours (996kcal, 1.65g pro/kg IBW, GIR 2.25g). **Noted, rate was not doubled for 12 hour period so pt has only been receiving half of intended nutrition. Now also ordered for Clear Liquid diet (3/14). Pt seems to be tolerating well- enjoying icees and jello in particular. Pt inquired again about when diet could be advanced to solid foods- will f/u w/ team to see if there is plan for further advancement. Denies N/V, no abdominal comfort after drinking liquids. Small liquid ostomy output today per RN. POCT B, 154, 130, 241mg/dL; lytes WNL except Phos 4.8; Tmg/dL (3/21), 199 (3/10) 287mg/dL (3/6), 189mg/dL (3/4), 383mg/dL (3/3).  Please trend TG 2x/week and monitor need for 20g 20% lipids 4x/week vs 50g. PO diet advancement per team. D/w pt 12 hours infusions vs 24hrs. Pt remains understanding of meeting needs via TPN until diet is able to be advanced further. Please obtain new wt to assess adequacy of feeds. RD to follow.     Previous Nutrition Diagnosis:  increased nutrient needs RT increased demand for kcal/pro AEB catabolic illness, s/p chemo treatment and possible fistula losses.   Active [ x  ]  Resolved [   ]    If resolved, new PES:     Goal:  Pt to consistently meet % of estimated needs via most appropriate route    Recommendations:  1) For cyclic TPN, recommend 225g Dex, 72g AA, 50g 20% lipids x12 hrs (1536kcal, 1.5g pro/kg IBW, GIR 4.95g).   >>Please do 1/2 rate the first hour, whole rate following 10 hours and 1/2 rate the last hour to avoid any drops or spikes in blood glucose.   >>Recommend checking TG 2x/week. Check Mg, Phos, K daily and POC BG Q6hrs. Trend daily weights. Fluids and lytes per MD discretion.   2)  If TG >400mg/dL, consider switching to 20g 20% lipids vs 50g 4x/week. TG may increase further w/ increased infusion rate of lipids.  3) When PO is deemed medically feasible recommend advancing to Clear Liquid diet w/ EnsureClears TID (720kcal, 24g pro) >> Full liquids w/ EnsureEnlive TID (1050kcal, 60g pro).  4) Please continue w/ PN as primary form of nutrition support until pt is capable of meeting >60% EER PO  5) please obtain updated weight  *d/w GYN      Education:   pt understanding of meeting needs via TPN for time being. D/w pt 12 hour infusions vs 24hrs.     Risk Level: High [ x  ] Moderate [   ] Low [   ].

## 2019-03-21 NOTE — CONSULT NOTE ADULT - SUBJECTIVE AND OBJECTIVE BOX
HPI: 60 y/o G0 with PMH significant for Breast Cancer, Diabetes Mellitus (A1c 6.9% on metformin), Endometrial Cancer stage 3, s/p exploratory laparotomy, enterolysis, SHAMIR, BSO, pelvic lymphadenectomy, low anterior resection mobilization of splenic flexure, end colostomy on 7/30/18 now with rectovaginal fistula. She was admitted to [place] one week ago for treatment of UTI, and now transferred to [facility] for further care. MRI obtained at [facility] showed enterovaginal and rectovaginal fistula, repeat MRI on 2/23/19 at [facility] shows cystitis, limited study – enterovaginal fistula not visualized. Surgery team following patient and was planned for enterovaginal fistula repair but now no surgical intervention deemed. Now with healing complex fistula associated bacteruria, hemodynamically stable and afebrile, joseph in place for overflow incontinence, re-started on anastrozole for treatment (3/16-). Patient currently is on TPN. Overnight patient had 2.7L urine output.     PMH:   PSH:  SH:   FH:   HOME MEDS:   ALLERGIES:     Current Meds:  acetaminophen   Tablet .. 650 milliGRAM(s) Oral every 6 hours PRN  anastrozole 1 milliGRAM(s) Oral daily  chlorhexidine 2% Cloths 1 Application(s) Topical <User Schedule>  dextrose 50% Injectable 25 Gram(s) IV Push once  enoxaparin Injectable 40 milliGRAM(s) SubCutaneous daily  famotidine  IVPB 20 milliGRAM(s) IV Intermittent two times a day  glucagon  Injectable 1 milliGRAM(s) IntraMuscular once PRN  ibuprofen  Suspension. 600 milliGRAM(s) Oral every 6 hours PRN  insulin lispro (HumaLOG) corrective regimen sliding scale   SubCutaneous every 6 hours  nystatin Powder 1 Application(s) Topical two times a day  pantoprazole  Injectable 40 milliGRAM(s) IV Push every 24 hours  Parenteral Nutrition - Adult 1 Each TPN Continuous <Continuous>  Parenteral Nutrition - Adult 1 Each TPN Continuous <Continuous>  sodium chloride 0.9% lock flush 10 milliLiter(s) IV Push every 1 hour PRN  zinc oxide 20% Ointment 1 Application(s) Topical three times a day      Allergies:  Allergy Status Unknown      ROS:  Denies the following except as indicated.    General: weight loss/weight gain, decreased appetite, fatigue  Eyes: Blurry vision, double vision, visual changes  ENT: Throat pain, changes in voice,   CV: palpitations, SOB, CP, cough  GI: NVD, difficulty swallowing, abdominal pain  : polyuria, dysuria  Endo: abnormal menses, temperature intolerance, decreased libido  MSK: weakness, joint pain  Skin: rash, dryness, diaphoresis  Heme: Easy bruising,bleeding  Neuro: HA, dizziness, lightheadedness, numbness tingling  Psych: Anxiety, Depression    Vital Signs Last 24 Hrs  T(C): 36.4 (21 Mar 2019 09:41), Max: 36.7 (20 Mar 2019 16:48)  T(F): 97.6 (21 Mar 2019 09:41), Max: 98.1 (20 Mar 2019 16:48)  HR: 74 (21 Mar 2019 05:35) (74 - 82)  BP: 123/78 (21 Mar 2019 09:41) (123/74 - 141/91)  BP(mean): --  RR: 16 (21 Mar 2019 09:41) (16 - 18)  SpO2: 100% (21 Mar 2019 09:41) (96% - 100%)        Constitutional: wn/wd in NAD.   HEENT: NCAT, MMM, OP clear, EOMI, , no proptosis or lid retraction  Neck: no thyromegaly or palpable thyroid nodules   Respiratory: lungs CTAB.  Cardiovascular: regular rhythm, normal S1 and S2, no audible murmurs, no peripheral edema  GI: soft, NT/ND, no masses/HSM appreciated.  Neurology: no tremors, DTR 2+  Skin: no visible rashes/lesions  Psychiatric: AAO x 3, normal affect/mood.  Ext: radial pulses intact, DP pulses intact, extremities warm, no cyanosis, clubbing or edema.       LABS:                        8.5    5.71  )-----------( 251      ( 21 Mar 2019 09:56 )             28.1     03-21    140  |  99  |  18  ----------------------------<  193<H>  4.6   |  28  |  0.54    Ca    9.8      21 Mar 2019 09:56  Phos  4.8     03-21  Mg     2.0     03-21    TPro  7.1  /  Alb  3.2<L>  /  TBili  <0.2  /  DBili  x   /  AST  15  /  ALT  17  /  AlkPhos  126<H>  03-21    Hemoglobin A1C, Whole Blood: 6.9 (02-23 @ 07:39)    CAPILLARY BLOOD GLUCOSE  POCT Blood Glucose.: 159 mg/dL (21 Mar 2019 12:15)  POCT Blood Glucose.: 154 mg/dL (21 Mar 2019 06:18)  POCT Blood Glucose.: 130 mg/dL (20 Mar 2019 23:59)  POCT Blood Glucose.: 241 mg/dL (20 Mar 2019 16:49)        A/P: 60yo F with stage IV endometrial adenocarcinoma s/p staging surgery '18 and 1 round of chemotherapy 2/19 admitted for management of symptomatic rectovaginal and enterovaginal fistulas, now with healing complex fistula associated bacteruria, hemodynamically stable and afebrile, joseph in place for overflow incontinence, re-started on anastrozole for treatment (3/16-) HPI: 58 y/o G0 with PMH significant for Breast Cancer, Diabetes Mellitus (A1c 6.9% on metformin), Endometrial Cancer stage 3, s/p exploratory laparotomy, enterolysis, SHAMIR, BSO, pelvic lymphadenectomy, low anterior resection mobilization of splenic flexure, end colostomy on 7/30/18 now with rectovaginal fistula. She was admitted to [place] one week ago for treatment of UTI, and now transferred to [facility] for further care. MRI obtained at [facility] showed enterovaginal and rectovaginal fistula, repeat MRI on 2/23/19 at [facility] shows cystitis, limited study – enterovaginal fistula not visualized. Surgery team following patient and was planned for enterovaginal fistula repair but now no surgical intervention deemed. Now with healing complex fistula associated bacteruria, hemodynamically stable and afebrile, joseph in place for overflow incontinence, re-started on anastrozole for treatment (3/16-). Patient currently is on TPN for GI rest. Overnight patient had 2.7L urine output. Primary team concerned for possible DI.  Urine output noted to be between 130-1233 cc's/hour since march 19th. Patient notes no excessive thirst, drinking only on occasion. Currently is on a clear liquid diet. Plan is for TPN to continue for an additional ~3 weeks until fistula is healed.     PMH: see HPI  SH: denies smoking, alcohol, drug use.   PSH: left breast lumpectomy, right knee surgery with screw placement 2001, 7/2018 exploratory laparotomy, enterolysis, SHAMIR, BSO, pelvic lymphadenectomy, low anterior resection mobilization of splenic flexure, end colostomy   Meds: metformin 74355 BID, Nexium  Allergies: NKDA  FH: no diabetes, thyroid or AI dz.     Current Meds:  acetaminophen   Tablet .. 650 milliGRAM(s) Oral every 6 hours PRN  anastrozole 1 milliGRAM(s) Oral daily  chlorhexidine 2% Cloths 1 Application(s) Topical <User Schedule>  dextrose 50% Injectable 25 Gram(s) IV Push once  enoxaparin Injectable 40 milliGRAM(s) SubCutaneous daily  famotidine  IVPB 20 milliGRAM(s) IV Intermittent two times a day  glucagon  Injectable 1 milliGRAM(s) IntraMuscular once PRN  ibuprofen  Suspension. 600 milliGRAM(s) Oral every 6 hours PRN  insulin lispro (HumaLOG) corrective regimen sliding scale   SubCutaneous every 6 hours  nystatin Powder 1 Application(s) Topical two times a day  pantoprazole  Injectable 40 milliGRAM(s) IV Push every 24 hours  Parenteral Nutrition - Adult 1 Each TPN Continuous <Continuous>  Parenteral Nutrition - Adult 1 Each TPN Continuous <Continuous>  sodium chloride 0.9% lock flush 10 milliLiter(s) IV Push every 1 hour PRN  zinc oxide 20% Ointment 1 Application(s) Topical three times a day      Allergies:  Allergy Status Unknown    ROS:  Denies the following except as indicated.    General: weight loss/weight gain, decreased appetite, +fatigue  Eyes: Blurry vision, double vision, visual changes  ENT: Throat pain, changes in voice,   CV: palpitations, SOB, CP, cough  GI: NVD, difficulty swallowing, abdominal pain  : +++polyuria, dysuria  MSK: weakness, joint pain  Skin: rash, dryness, diaphoresis  Heme: Easy bruising, bleeding  Neuro: HA, dizziness, lightheadedness, numbness tingling  Psych: Anxiety, Depression    Vital Signs Last 24 Hrs  T(C): 36.4 (21 Mar 2019 09:41), Max: 36.7 (20 Mar 2019 16:48)  T(F): 97.6 (21 Mar 2019 09:41), Max: 98.1 (20 Mar 2019 16:48)  HR: 74 (21 Mar 2019 05:35) (74 - 82)  BP: 123/78 (21 Mar 2019 09:41) (123/74 - 141/91)  BP(mean): --  RR: 16 (21 Mar 2019 09:41) (16 - 18)  SpO2: 100% (21 Mar 2019 09:41) (96% - 100%)      Constitutional: wn/wd in NAD.   HEENT: NCAT, EOMI, no proptosis or lid retraction  Neck: no thyromegaly or palpable thyroid nodules   Respiratory: lungs CTAB.  Cardiovascular: regular rhythm, normal S1 and S2, no audible murmurs, + peripheral edema  GI: soft, +left sided colostomy bag (watery stool)  Neurology: no tremors, DTR 2+  Skin: no visible rashes/lesions, +right PICC  Psychiatric: AAO x 3, normal affect/mood.  Ext: radial pulses intact, DP pulses intact, extremities warm, no cyanosis, clubbing, +edema.       LABS:                        8.5    5.71  )-----------( 251      ( 21 Mar 2019 09:56 )             28.1     03-21    140  |  99  |  18  ----------------------------<  193<H>  4.6   |  28  |  0.54    Ca    9.8      21 Mar 2019 09:56  Phos  4.8     03-21  Mg     2.0     03-21    TPro  7.1  /  Alb  3.2<L>  /  TBili  <0.2  /  DBili  x   /  AST  15  /  ALT  17  /  AlkPhos  126<H>  03-21    Hemoglobin A1C, Whole Blood: 6.9 (02-23 @ 07:39)    CAPILLARY BLOOD GLUCOSE  POCT Blood Glucose.: 159 mg/dL (21 Mar 2019 12:15)  POCT Blood Glucose.: 154 mg/dL (21 Mar 2019 06:18)  POCT Blood Glucose.: 130 mg/dL (20 Mar 2019 23:59)  POCT Blood Glucose.: 241 mg/dL (20 Mar 2019 16:49)    A/P: 58yo F with stage IV endometrial adenocarcinoma s/p staging surgery '18 and 1 round of chemotherapy 2/19 admitted for management of symptomatic rectovaginal and enterovaginal fistulas, now with healing complex fistula associated bacteruria, hemodynamically stable and afebrile, joseph in place for overflow incontinence, re-started on anastrozole for treatment (3/16-). Patient has had significant total urine output over last few days over 2 liters. Hourly is it not exceeding 250 cc's/hour. Serum sodium has been maintained between 138-142 throughout admission.     1. Polyuria   -At this time, would recommend to rule out diabetes insipidus by doing a water deprivation test although low probability at this time   -Please make patient NPO for 12 hours including stopping the TPN and clear liquid diet.   -Please get a baseline BMP, urine osm and serum osm and then repeat Q4 hourly.     2. Diabetes -A1c 6.9%, controlled   -Would continue moderate SS at this time   -When patient's clears are resumed, please switch to diabetic clears   -Check Vit D level     Case d/w Dr. Robles, GYN team updated. HPI: 58 y/o G0 with PMH significant for Breast Cancer, Diabetes Mellitus (A1c 6.9% on metformin), Endometrial Cancer stage 3, s/p exploratory laparotomy, enterolysis, SHAMIR, BSO, pelvic lymphadenectomy, low anterior resection mobilization of splenic flexure, end colostomy on 7/30/18 now with rectovaginal fistula. She was admitted to [facility] one week ago for treatment of UTI, and now transferred to [facility] for further care. MRI obtained at [facility] showed enterovaginal and rectovaginal fistula, repeat MRI on 2/23/19 at [facility] shows cystitis, limited study – enterovaginal fistula not visualized. Surgery team following patient and was planned for enterovaginal fistula repair but now no surgical intervention deemed. Now with healing complex fistula associated bacteruria, hemodynamically stable and afebrile, joseph in place for overflow incontinence, re-started on anastrozole for treatment (3/16-). Patient currently is on TPN for GI rest. Overnight patient had 2.7L urine output. Primary team concerned for possible DI.  Urine output noted to be between 130-1233 cc's/hour since march 19th. Patient notes no excessive thirst, drinking only on occasion. Currently is on a clear liquid diet. Plan is for TPN to continue for an additional ~3 weeks until fistula is healed.     PMH: see HPI  SH: denies smoking, alcohol, drug use.   PSH: left breast lumpectomy, right knee surgery with screw placement 2001, 7/2018 exploratory laparotomy, enterolysis, SHAMIR, BSO, pelvic lymphadenectomy, low anterior resection mobilization of splenic flexure, end colostomy   Meds: metformin 1000 BID, Nexium  Allergies: NKDA  FH: no diabetes, thyroid or AI dz.     Current Meds:  acetaminophen   Tablet .. 650 milliGRAM(s) Oral every 6 hours PRN  anastrozole 1 milliGRAM(s) Oral daily  chlorhexidine 2% Cloths 1 Application(s) Topical <User Schedule>  dextrose 50% Injectable 25 Gram(s) IV Push once  enoxaparin Injectable 40 milliGRAM(s) SubCutaneous daily  famotidine  IVPB 20 milliGRAM(s) IV Intermittent two times a day  glucagon  Injectable 1 milliGRAM(s) IntraMuscular once PRN  ibuprofen  Suspension. 600 milliGRAM(s) Oral every 6 hours PRN  insulin lispro (HumaLOG) corrective regimen sliding scale   SubCutaneous every 6 hours  nystatin Powder 1 Application(s) Topical two times a day  pantoprazole  Injectable 40 milliGRAM(s) IV Push every 24 hours  Parenteral Nutrition - Adult 1 Each TPN Continuous <Continuous>  Parenteral Nutrition - Adult 1 Each TPN Continuous <Continuous>  sodium chloride 0.9% lock flush 10 milliLiter(s) IV Push every 1 hour PRN  zinc oxide 20% Ointment 1 Application(s) Topical three times a day      Allergies:  Allergy Status Unknown    ROS:  Denies the following except as indicated.    General: weight loss/weight gain, decreased appetite, +fatigue  Eyes: Blurry vision, double vision, visual changes  ENT: Throat pain, changes in voice,   CV: palpitations, SOB, CP, cough  GI: NVD, difficulty swallowing, abdominal pain  : +++polyuria, dysuria  MSK: weakness, joint pain  Skin: rash, dryness, diaphoresis  Heme: Easy bruising, bleeding  Neuro: HA, dizziness, lightheadedness, numbness tingling  Psych: Anxiety, Depression    Vital Signs Last 24 Hrs  T(C): 36.4 (21 Mar 2019 09:41), Max: 36.7 (20 Mar 2019 16:48)  T(F): 97.6 (21 Mar 2019 09:41), Max: 98.1 (20 Mar 2019 16:48)  HR: 74 (21 Mar 2019 05:35) (74 - 82)  BP: 123/78 (21 Mar 2019 09:41) (123/74 - 141/91)  BP(mean): --  RR: 16 (21 Mar 2019 09:41) (16 - 18)  SpO2: 100% (21 Mar 2019 09:41) (96% - 100%)      Constitutional: wn/wd in NAD.   HEENT: NCAT, EOMI, no proptosis or lid retraction  Neck: no thyromegaly or palpable thyroid nodules   Respiratory: lungs CTAB.  Cardiovascular: regular rhythm, normal S1 and S2, no audible murmurs, + peripheral edema  GI: soft, +left sided colostomy bag (watery stool)  Neurology: no tremors, DTR 2+  Skin: no visible rashes/lesions, +right PICC  Psychiatric: AAO x 3, normal affect/mood.  Ext: radial pulses intact, DP pulses intact, extremities warm, no cyanosis, clubbing, +edema.       LABS:                        8.5    5.71  )-----------( 251      ( 21 Mar 2019 09:56 )             28.1     03-21    140  |  99  |  18  ----------------------------<  193<H>  4.6   |  28  |  0.54    Ca    9.8      21 Mar 2019 09:56  Phos  4.8     03-21  Mg     2.0     03-21    TPro  7.1  /  Alb  3.2<L>  /  TBili  <0.2  /  DBili  x   /  AST  15  /  ALT  17  /  AlkPhos  126<H>  03-21    Hemoglobin A1C, Whole Blood: 6.9 (02-23 @ 07:39)    CAPILLARY BLOOD GLUCOSE  POCT Blood Glucose.: 159 mg/dL (21 Mar 2019 12:15)  POCT Blood Glucose.: 154 mg/dL (21 Mar 2019 06:18)  POCT Blood Glucose.: 130 mg/dL (20 Mar 2019 23:59)  POCT Blood Glucose.: 241 mg/dL (20 Mar 2019 16:49)    A/P: 60yo F with stage IV endometrial adenocarcinoma s/p staging surgery '18 and 1 round of chemotherapy 2/19 admitted for management of symptomatic rectovaginal and enterovaginal fistulas, now with healing complex fistula associated bacteruria, hemodynamically stable and afebrile, joseph in place for overflow incontinence, re-started on anastrozole for treatment (3/16-). Patient has had significant total urine output over last few days over 2 liters. Hourly is it not exceeding 250 cc's/hour. Serum sodium has been maintained between 138-142 throughout admission.     1. Polyuria   -At this time, would recommend to rule out diabetes insipidus by doing a water deprivation test although low probability at this time   -Please make patient NPO for 12 hours including stopping the TPN and clear liquid diet.   -Please get a baseline BMP, urine osm and serum osm and then repeat Q4 hourly.     2. Diabetes -A1c 6.9%, controlled   -Would continue moderate SS at this time   -When patient's clears are resumed, please switch to diabetic clears   -Check Vit D level     Case d/w Dr. Robles, GYN team updated.

## 2019-03-21 NOTE — CONSULT NOTE ADULT - CONSULT REQUESTED DATE/TIME
06-Mar-2019 11:52
18-Mar-2019 19:57
21-Mar-2019 12:43
23-Feb-2019 11:14
25-Feb-2019 13:08
28-Feb-2019 16:45
27-Feb-2019 10:53
08-Mar-2019 08:00

## 2019-03-22 LAB
ALBUMIN SERPL ELPH-MCNC: 3.5 G/DL — SIGNIFICANT CHANGE UP (ref 3.3–5)
ALBUMIN SERPL ELPH-MCNC: 3.6 G/DL — SIGNIFICANT CHANGE UP (ref 3.3–5)
ALBUMIN SERPL ELPH-MCNC: 3.8 G/DL — SIGNIFICANT CHANGE UP (ref 3.3–5)
ALP SERPL-CCNC: 127 U/L — HIGH (ref 40–120)
ALP SERPL-CCNC: 140 U/L — HIGH (ref 40–120)
ALP SERPL-CCNC: 144 U/L — HIGH (ref 40–120)
ALT FLD-CCNC: 15 U/L — SIGNIFICANT CHANGE UP (ref 10–45)
ALT FLD-CCNC: 15 U/L — SIGNIFICANT CHANGE UP (ref 10–45)
ALT FLD-CCNC: 23 U/L — SIGNIFICANT CHANGE UP (ref 10–45)
ANION GAP SERPL CALC-SCNC: 11 MMOL/L — SIGNIFICANT CHANGE UP (ref 5–17)
ANION GAP SERPL CALC-SCNC: 12 MMOL/L — SIGNIFICANT CHANGE UP (ref 5–17)
ANION GAP SERPL CALC-SCNC: 13 MMOL/L — SIGNIFICANT CHANGE UP (ref 5–17)
ANION GAP SERPL CALC-SCNC: 14 MMOL/L — SIGNIFICANT CHANGE UP (ref 5–17)
AST SERPL-CCNC: 13 U/L — SIGNIFICANT CHANGE UP (ref 10–40)
AST SERPL-CCNC: 17 U/L — SIGNIFICANT CHANGE UP (ref 10–40)
AST SERPL-CCNC: 32 U/L — SIGNIFICANT CHANGE UP (ref 10–40)
BASOPHILS # BLD AUTO: 0.01 K/UL — SIGNIFICANT CHANGE UP (ref 0–0.2)
BASOPHILS # BLD AUTO: 0.04 K/UL — SIGNIFICANT CHANGE UP (ref 0–0.2)
BASOPHILS NFR BLD AUTO: 0.2 % — SIGNIFICANT CHANGE UP (ref 0–2)
BASOPHILS NFR BLD AUTO: 0.4 % — SIGNIFICANT CHANGE UP (ref 0–2)
BILIRUB SERPL-MCNC: 0.2 MG/DL — SIGNIFICANT CHANGE UP (ref 0.2–1.2)
BILIRUB SERPL-MCNC: 0.3 MG/DL — SIGNIFICANT CHANGE UP (ref 0.2–1.2)
BILIRUB SERPL-MCNC: 0.4 MG/DL — SIGNIFICANT CHANGE UP (ref 0.2–1.2)
BUN SERPL-MCNC: 28 MG/DL — HIGH (ref 7–23)
BUN SERPL-MCNC: 28 MG/DL — HIGH (ref 7–23)
BUN SERPL-MCNC: 30 MG/DL — HIGH (ref 7–23)
BUN SERPL-MCNC: 30 MG/DL — HIGH (ref 7–23)
BUN SERPL-MCNC: 32 MG/DL — HIGH (ref 7–23)
BUN SERPL-MCNC: 32 MG/DL — HIGH (ref 7–23)
CALCIUM SERPL-MCNC: 10 MG/DL — SIGNIFICANT CHANGE UP (ref 8.4–10.5)
CALCIUM SERPL-MCNC: 10.2 MG/DL — SIGNIFICANT CHANGE UP (ref 8.4–10.5)
CALCIUM SERPL-MCNC: 10.2 MG/DL — SIGNIFICANT CHANGE UP (ref 8.4–10.5)
CALCIUM SERPL-MCNC: 10.4 MG/DL — SIGNIFICANT CHANGE UP (ref 8.4–10.5)
CALCIUM SERPL-MCNC: 10.4 MG/DL — SIGNIFICANT CHANGE UP (ref 8.4–10.5)
CALCIUM SERPL-MCNC: 9.8 MG/DL — SIGNIFICANT CHANGE UP (ref 8.4–10.5)
CHLORIDE SERPL-SCNC: 100 MMOL/L — SIGNIFICANT CHANGE UP (ref 96–108)
CHLORIDE SERPL-SCNC: 101 MMOL/L — SIGNIFICANT CHANGE UP (ref 96–108)
CHOLEST SERPL-MCNC: 107 MG/DL — SIGNIFICANT CHANGE UP (ref 10–199)
CHOLEST SERPL-MCNC: 127 MG/DL — SIGNIFICANT CHANGE UP (ref 10–199)
CO2 SERPL-SCNC: 23 MMOL/L — SIGNIFICANT CHANGE UP (ref 22–31)
CO2 SERPL-SCNC: 24 MMOL/L — SIGNIFICANT CHANGE UP (ref 22–31)
CO2 SERPL-SCNC: 25 MMOL/L — SIGNIFICANT CHANGE UP (ref 22–31)
CO2 SERPL-SCNC: 26 MMOL/L — SIGNIFICANT CHANGE UP (ref 22–31)
CO2 SERPL-SCNC: 26 MMOL/L — SIGNIFICANT CHANGE UP (ref 22–31)
CO2 SERPL-SCNC: 27 MMOL/L — SIGNIFICANT CHANGE UP (ref 22–31)
CREAT ?TM UR-MCNC: 10 MG/DL — SIGNIFICANT CHANGE UP
CREAT SERPL-MCNC: 0.58 MG/DL — SIGNIFICANT CHANGE UP (ref 0.5–1.3)
CREAT SERPL-MCNC: 0.61 MG/DL — SIGNIFICANT CHANGE UP (ref 0.5–1.3)
CREAT SERPL-MCNC: 0.62 MG/DL — SIGNIFICANT CHANGE UP (ref 0.5–1.3)
CREAT SERPL-MCNC: 0.64 MG/DL — SIGNIFICANT CHANGE UP (ref 0.5–1.3)
CREAT SERPL-MCNC: 0.67 MG/DL — SIGNIFICANT CHANGE UP (ref 0.5–1.3)
CREAT SERPL-MCNC: 0.69 MG/DL — SIGNIFICANT CHANGE UP (ref 0.5–1.3)
CULTURE RESULTS: NO GROWTH — SIGNIFICANT CHANGE UP
EOSINOPHIL # BLD AUTO: 0.26 K/UL — SIGNIFICANT CHANGE UP (ref 0–0.5)
EOSINOPHIL # BLD AUTO: 0.29 K/UL — SIGNIFICANT CHANGE UP (ref 0–0.5)
EOSINOPHIL NFR BLD AUTO: 3.2 % — SIGNIFICANT CHANGE UP (ref 0–6)
EOSINOPHIL NFR BLD AUTO: 4.1 % — SIGNIFICANT CHANGE UP (ref 0–6)
GLUCOSE BLDC GLUCOMTR-MCNC: 101 MG/DL — HIGH (ref 70–99)
GLUCOSE BLDC GLUCOMTR-MCNC: 102 MG/DL — HIGH (ref 70–99)
GLUCOSE BLDC GLUCOMTR-MCNC: 153 MG/DL — HIGH (ref 70–99)
GLUCOSE SERPL-MCNC: 108 MG/DL — HIGH (ref 70–99)
GLUCOSE SERPL-MCNC: 109 MG/DL — HIGH (ref 70–99)
GLUCOSE SERPL-MCNC: 110 MG/DL — HIGH (ref 70–99)
GLUCOSE SERPL-MCNC: 117 MG/DL — HIGH (ref 70–99)
GLUCOSE SERPL-MCNC: 129 MG/DL — HIGH (ref 70–99)
GLUCOSE SERPL-MCNC: 68 MG/DL — LOW (ref 70–99)
HCT VFR BLD CALC: 29.6 % — LOW (ref 34.5–45)
HCT VFR BLD CALC: 32.7 % — LOW (ref 34.5–45)
HDLC SERPL-MCNC: 26 MG/DL — LOW
HDLC SERPL-MCNC: 28 MG/DL — LOW
HGB BLD-MCNC: 10.3 G/DL — LOW (ref 11.5–15.5)
HGB BLD-MCNC: 9.1 G/DL — LOW (ref 11.5–15.5)
IMM GRANULOCYTES NFR BLD AUTO: 0.4 % — SIGNIFICANT CHANGE UP (ref 0–1.5)
IMM GRANULOCYTES NFR BLD AUTO: 0.5 % — SIGNIFICANT CHANGE UP (ref 0–1.5)
LIPID PNL WITH DIRECT LDL SERPL: 37 MG/DL — SIGNIFICANT CHANGE UP
LIPID PNL WITH DIRECT LDL SERPL: 44 MG/DL — SIGNIFICANT CHANGE UP
LYMPHOCYTES # BLD AUTO: 1.65 K/UL — SIGNIFICANT CHANGE UP (ref 1–3.3)
LYMPHOCYTES # BLD AUTO: 2.42 K/UL — SIGNIFICANT CHANGE UP (ref 1–3.3)
LYMPHOCYTES # BLD AUTO: 25.9 % — SIGNIFICANT CHANGE UP (ref 13–44)
LYMPHOCYTES # BLD AUTO: 26.8 % — SIGNIFICANT CHANGE UP (ref 13–44)
MAGNESIUM SERPL-MCNC: 2.3 MG/DL — SIGNIFICANT CHANGE UP (ref 1.6–2.6)
MAGNESIUM SERPL-MCNC: 2.4 MG/DL — SIGNIFICANT CHANGE UP (ref 1.6–2.6)
MCHC RBC-ENTMCNC: 30.3 PG — SIGNIFICANT CHANGE UP (ref 27–34)
MCHC RBC-ENTMCNC: 30.4 PG — SIGNIFICANT CHANGE UP (ref 27–34)
MCHC RBC-ENTMCNC: 30.7 GM/DL — LOW (ref 32–36)
MCHC RBC-ENTMCNC: 31.5 GM/DL — LOW (ref 32–36)
MCV RBC AUTO: 96.2 FL — SIGNIFICANT CHANGE UP (ref 80–100)
MCV RBC AUTO: 99 FL — SIGNIFICANT CHANGE UP (ref 80–100)
MONOCYTES # BLD AUTO: 0.85 K/UL — SIGNIFICANT CHANGE UP (ref 0–0.9)
MONOCYTES # BLD AUTO: 0.91 K/UL — HIGH (ref 0–0.9)
MONOCYTES NFR BLD AUTO: 14.3 % — HIGH (ref 2–14)
MONOCYTES NFR BLD AUTO: 9.4 % — SIGNIFICANT CHANGE UP (ref 2–14)
NEUTROPHILS # BLD AUTO: 3.5 K/UL — SIGNIFICANT CHANGE UP (ref 1.8–7.4)
NEUTROPHILS # BLD AUTO: 5.38 K/UL — SIGNIFICANT CHANGE UP (ref 1.8–7.4)
NEUTROPHILS NFR BLD AUTO: 55 % — SIGNIFICANT CHANGE UP (ref 43–77)
NEUTROPHILS NFR BLD AUTO: 59.8 % — SIGNIFICANT CHANGE UP (ref 43–77)
NRBC # BLD: 0 /100 WBCS — SIGNIFICANT CHANGE UP (ref 0–0)
NRBC # BLD: 0 /100 WBCS — SIGNIFICANT CHANGE UP (ref 0–0)
OSMOLALITY SERPL: 291 MOSM/KG — SIGNIFICANT CHANGE UP (ref 280–301)
OSMOLALITY SERPL: 293 MOSM/KG — SIGNIFICANT CHANGE UP (ref 280–301)
OSMOLALITY SERPL: 294 MOSM/KG — SIGNIFICANT CHANGE UP (ref 280–301)
OSMOLALITY SERPL: 296 MOSM/KG — SIGNIFICANT CHANGE UP (ref 280–301)
OSMOLALITY SERPL: 298 MOSM/KG — SIGNIFICANT CHANGE UP (ref 280–301)
OSMOLALITY UR: 283 MOSMOL/KG — SIGNIFICANT CHANGE UP (ref 100–650)
OSMOLALITY UR: 329 MOSMOL/KG — SIGNIFICANT CHANGE UP (ref 100–650)
OSMOLALITY UR: 334 MOSMOL/KG — SIGNIFICANT CHANGE UP (ref 100–650)
OSMOLALITY UR: 347 MOSMOL/KG — SIGNIFICANT CHANGE UP (ref 100–650)
OSMOLALITY UR: 359 MOSMOL/KG — SIGNIFICANT CHANGE UP (ref 100–650)
OSMOLALITY UR: 369 MOSMOL/KG — SIGNIFICANT CHANGE UP (ref 100–650)
OSMOLALITY UR: 380 MOSMOL/KG — SIGNIFICANT CHANGE UP (ref 100–650)
OSMOLALITY UR: 386 MOSMOL/KG — SIGNIFICANT CHANGE UP (ref 100–650)
OSMOLALITY UR: 448 MOSMOL/KG — SIGNIFICANT CHANGE UP (ref 100–650)
OSMOLALITY UR: 494 MOSMOL/KG — SIGNIFICANT CHANGE UP (ref 100–650)
PHOSPHATE SERPL-MCNC: 4.4 MG/DL — SIGNIFICANT CHANGE UP (ref 2.5–4.5)
PHOSPHATE SERPL-MCNC: 5.9 MG/DL — HIGH (ref 2.5–4.5)
PLATELET # BLD AUTO: 250 K/UL — SIGNIFICANT CHANGE UP (ref 150–400)
PLATELET # BLD AUTO: 367 K/UL — SIGNIFICANT CHANGE UP (ref 150–400)
POTASSIUM SERPL-MCNC: 4.9 MMOL/L — SIGNIFICANT CHANGE UP (ref 3.5–5.3)
POTASSIUM SERPL-MCNC: 5 MMOL/L — SIGNIFICANT CHANGE UP (ref 3.5–5.3)
POTASSIUM SERPL-MCNC: 5.1 MMOL/L — SIGNIFICANT CHANGE UP (ref 3.5–5.3)
POTASSIUM SERPL-MCNC: 5.5 MMOL/L — HIGH (ref 3.5–5.3)
POTASSIUM SERPL-MCNC: 6 MMOL/L — HIGH (ref 3.5–5.3)
POTASSIUM SERPL-MCNC: SIGNIFICANT CHANGE UP MMOL/L (ref 3.5–5.3)
POTASSIUM SERPL-SCNC: 4.9 MMOL/L — SIGNIFICANT CHANGE UP (ref 3.5–5.3)
POTASSIUM SERPL-SCNC: 5 MMOL/L — SIGNIFICANT CHANGE UP (ref 3.5–5.3)
POTASSIUM SERPL-SCNC: 5.1 MMOL/L — SIGNIFICANT CHANGE UP (ref 3.5–5.3)
POTASSIUM SERPL-SCNC: 5.5 MMOL/L — HIGH (ref 3.5–5.3)
POTASSIUM SERPL-SCNC: 6 MMOL/L — HIGH (ref 3.5–5.3)
POTASSIUM SERPL-SCNC: SIGNIFICANT CHANGE UP MMOL/L (ref 3.5–5.3)
PROT SERPL-MCNC: 7.4 G/DL — SIGNIFICANT CHANGE UP (ref 6–8.3)
PROT SERPL-MCNC: 7.5 G/DL — SIGNIFICANT CHANGE UP (ref 6–8.3)
PROT SERPL-MCNC: 8.4 G/DL — HIGH (ref 6–8.3)
RBC # BLD: 2.99 M/UL — LOW (ref 3.8–5.2)
RBC # BLD: 3.4 M/UL — LOW (ref 3.8–5.2)
RBC # FLD: 16.9 % — HIGH (ref 10.3–14.5)
RBC # FLD: 17 % — HIGH (ref 10.3–14.5)
SODIUM SERPL-SCNC: 137 MMOL/L — SIGNIFICANT CHANGE UP (ref 135–145)
SODIUM SERPL-SCNC: 138 MMOL/L — SIGNIFICANT CHANGE UP (ref 135–145)
SODIUM SERPL-SCNC: 139 MMOL/L — SIGNIFICANT CHANGE UP (ref 135–145)
SODIUM UR-SCNC: 99 MMOL/L — SIGNIFICANT CHANGE UP
SPECIMEN SOURCE: SIGNIFICANT CHANGE UP
TOTAL CHOLESTEROL/HDL RATIO MEASUREMENT: 4.1 RATIO — SIGNIFICANT CHANGE UP (ref 3.3–7.1)
TOTAL CHOLESTEROL/HDL RATIO MEASUREMENT: 4.5 RATIO — SIGNIFICANT CHANGE UP (ref 3.3–7.1)
TRIGL SERPL-MCNC: 220 MG/DL — HIGH (ref 10–149)
TRIGL SERPL-MCNC: 275 MG/DL — HIGH (ref 10–149)
WBC # BLD: 6.36 K/UL — SIGNIFICANT CHANGE UP (ref 3.8–10.5)
WBC # BLD: 9.02 K/UL — SIGNIFICANT CHANGE UP (ref 3.8–10.5)
WBC # FLD AUTO: 6.36 K/UL — SIGNIFICANT CHANGE UP (ref 3.8–10.5)
WBC # FLD AUTO: 9.02 K/UL — SIGNIFICANT CHANGE UP (ref 3.8–10.5)

## 2019-03-22 PROCEDURE — 99232 SBSQ HOSP IP/OBS MODERATE 35: CPT | Mod: GC

## 2019-03-22 PROCEDURE — 99233 SBSQ HOSP IP/OBS HIGH 50: CPT

## 2019-03-22 RX ORDER — ELECTROLYTE SOLUTION,INJ
1 VIAL (ML) INTRAVENOUS
Qty: 0 | Refills: 0 | Status: DISCONTINUED | OUTPATIENT
Start: 2019-03-22 | End: 2019-03-23

## 2019-03-22 RX ORDER — I.V. FAT EMULSION 20 G/100ML
50 EMULSION INTRAVENOUS ONCE
Qty: 0 | Refills: 0 | Status: COMPLETED | OUTPATIENT
Start: 2019-03-22 | End: 2019-03-22

## 2019-03-22 RX ORDER — DESMOPRESSIN ACETATE 0.1 MG/1
1 TABLET ORAL ONCE
Qty: 0 | Refills: 0 | Status: COMPLETED | OUTPATIENT
Start: 2019-03-22 | End: 2019-03-22

## 2019-03-22 RX ADMIN — ZINC OXIDE 1 APPLICATION(S): 200 OINTMENT TOPICAL at 22:30

## 2019-03-22 RX ADMIN — Medication 2: at 07:43

## 2019-03-22 RX ADMIN — FAMOTIDINE 104 MILLIGRAM(S): 10 INJECTION INTRAVENOUS at 05:33

## 2019-03-22 RX ADMIN — NYSTATIN CREAM 1 APPLICATION(S): 100000 CREAM TOPICAL at 05:34

## 2019-03-22 RX ADMIN — NYSTATIN CREAM 1 APPLICATION(S): 100000 CREAM TOPICAL at 22:30

## 2019-03-22 RX ADMIN — FAMOTIDINE 104 MILLIGRAM(S): 10 INJECTION INTRAVENOUS at 18:18

## 2019-03-22 RX ADMIN — DESMOPRESSIN ACETATE 1 SPRAY(S): 0.1 TABLET ORAL at 22:31

## 2019-03-22 RX ADMIN — ENOXAPARIN SODIUM 40 MILLIGRAM(S): 100 INJECTION SUBCUTANEOUS at 12:43

## 2019-03-22 RX ADMIN — CHLORHEXIDINE GLUCONATE 1 APPLICATION(S): 213 SOLUTION TOPICAL at 05:33

## 2019-03-22 RX ADMIN — PANTOPRAZOLE SODIUM 40 MILLIGRAM(S): 20 TABLET, DELAYED RELEASE ORAL at 05:33

## 2019-03-22 RX ADMIN — ZINC OXIDE 1 APPLICATION(S): 200 OINTMENT TOPICAL at 13:26

## 2019-03-22 RX ADMIN — ZINC OXIDE 1 APPLICATION(S): 200 OINTMENT TOPICAL at 05:43

## 2019-03-22 NOTE — PROGRESS NOTE ADULT - ATTENDING COMMENTS
ID Attending:    low grade fever today, with rising WBC.  Westbrook removed ~3 days ago    No suprapubic ot CVA tenderness    Plan:  we have started vancomycin and ertapenem to cover the previously isolated ESBL Ecoli and MRSA (urinary)    Now that she does not have an indwelling catheter, we have a chance of eradicating the infection.    Will follow
Pt seen on rounds this afternoon.  Highly doubt that her increased urine output is due to DI--she has not become hypernatremic despite her fluid intake being limited to TPN.  Suspect that the increase urine outputs during certain intervals is an artifact of her TPN being cycled, with her receiving 2 liters over only 12 hours.  Her glucoses are intermittently elevated, but mostly when the TPN is running.  Can continue the protocol outlined by Dr. Robles for monitoring hourly urine osmolalities, but if they increase toward 600 with no glycosuria in the UA, do not need to give dDAVP or even draw ADH--the urine osmolality itself essentially rules out DI.  Would strongly consider running the TPN over 18 hours rather than 12 hours, and need to include 2-hr step-up and step-down periods.  Need to decrease both the K and PO4 in the formula.  The calories also need to be increased, but will hold off specific recommendations pending what will be done regarding advancing her diet.
Long discussion with patient and her sister both yesterday evening and this morning regarding goals of care and importance of optimizing performance status prior to starting any cancer directed therapies. Patient is mostly bed bound, able to walk only occasionally with the assistance of physical therapy. Recommendation from PT is for placement in a rehab facility.     Output from fistula is subjectively decreased per patient. Will continue current management.
Patient complaining of soreness along the sacral promintory. Examined carefully, no obvious pressure ulcer noted, however there is a raised plaque over this area, that could be an early sign of breakdown. Will discuss positioning with nursing staff and we encouraged patient to be OOB as much as possible.
Patient seen at the bedside this afternoon with the team. Also at the bedside was Dr. Spivey. New fevers and chills, and elevated WBC concerning for infectious process. ID consulted this morning and recommend starting antibiotics. Blood cultures pending. VS significant for HR 90s. Will continue to monitor closely for s/s of sepsis. Plan for CT abdomen pelvis to reassess intrabdominal process. Patient once again counseled that we cannot offer cancer directed treatement until she is medically optimized.
Patient with no acute changes in status. She reports mild abdominal pain that responded to tylenol. Fistula output continues to be low. Treatment options discussed once again in detail. It seems that LITO placement will be difficult given her colonization with antibiotic resistant microbes in addition to her TPN requirement. Will consider chemotherapy this hospital admission. R/B and likelihood of treatment response discussed in detail with patient and her sister. Chemotherapy would be for palliation, it was discussed that her disease is not curable.
pt seen examnd by me personally  as above  abdom nonTender    pt states feels better today   ostomy pink viable fct well  Will likely need OR for enteroVaginal fistula correction
ID Attending    She is much improved.    Would complete a 7-day course of vanco + ertapenem for Westbrook-associated UTI. (Westbrook now removed)    Please call if we may be of further assistance
Evidence of bladder atony once again seen on CT imaging. Patient likely with overflow incontinence 2' to bladder atony. Westbrook replaced at bedside with team. Will restart anastrazole home regimen today given low tumor burden on CT imaging.

## 2019-03-22 NOTE — PROGRESS NOTE ADULT - SUBJECTIVE AND OBJECTIVE BOX
INTERVAL HPI/OVERNIGHT EVENTS:    Patient seen at bedside. Has been NPO since morning. TPN dextrose was decreased. Patient is still pending DI workup. States she is slightly thirsty. Urine outpatient has been stable between 100-200 cc's/hour except for this morning she had 400cc's/hour from 6am-9am. Blood sugars remain in 100s to 150s today.     3/21: 154, 159, 124, 189  3/22: 153, 102    ROS:  Denies the following except as indicated.    General: weight loss/weight gain, decreased appetite, +fatigue  Eyes: Blurry vision, double vision, visual changes  ENT: Throat pain, changes in voice,   CV: palpitations, SOB, CP, cough  GI: NVD, difficulty swallowing, abdominal pain  : +++polyuria, dysuria  MSK: weakness, joint pain  Skin: rash, dryness, diaphoresis  Heme: Easy bruising, bleeding  Neuro: HA, dizziness, lightheadedness, numbness tingling  Psych: Anxiety, Depression    MEDICATIONS  (STANDING):  anastrozole 1 milliGRAM(s) Oral daily  chlorhexidine 2% Cloths 1 Application(s) Topical <User Schedule>  dextrose 50% Injectable 25 Gram(s) IV Push once  enoxaparin Injectable 40 milliGRAM(s) SubCutaneous daily  famotidine  IVPB 20 milliGRAM(s) IV Intermittent two times a day  fat emulsion (Plant Based) 20% IVPB 50 Gram(s) IV Intermittent once  insulin lispro (HumaLOG) corrective regimen sliding scale   SubCutaneous every 6 hours  nystatin Powder 1 Application(s) Topical two times a day  pantoprazole  Injectable 40 milliGRAM(s) IV Push every 24 hours  Parenteral Nutrition - Adult 1 Each TPN Continuous <Continuous>  Parenteral Nutrition - Adult 1 Each TPN Continuous <Continuous>  zinc oxide 20% Ointment 1 Application(s) Topical three times a day    MEDICATIONS  (PRN):  acetaminophen   Tablet .. 650 milliGRAM(s) Oral every 6 hours PRN Temp greater or equal to 38C (100.4F), Mild Pain (1 - 3)  glucagon  Injectable 1 milliGRAM(s) IntraMuscular once PRN Glucose LESS THAN 70 milligrams/deciliter  ibuprofen  Suspension. 600 milliGRAM(s) Oral every 6 hours PRN Moderate Pain (4 - 6)  sodium chloride 0.9% lock flush 10 milliLiter(s) IV Push every 1 hour PRN Pre/post blood products, medications, blood draw, and to maintain line patency    PHYSICAL EXAM  Vital Signs Last 24 Hrs  T(C): 36.3 (22 Mar 2019 17:30), Max: 37.6 (22 Mar 2019 10:08)  T(F): 97.3 (22 Mar 2019 17:30), Max: 99.6 (22 Mar 2019 10:08)  HR: 103 (22 Mar 2019 17:30) (78 - 103)  BP: 79/58 (22 Mar 2019 17:30) (79/58 - 130/71)  BP(mean): --  RR: 18 (22 Mar 2019 17:30) (17 - 18)  SpO2: 99% (22 Mar 2019 17:30) (96% - 99%)    Constitutional: wn/wd in NAD.   HEENT: NCAT, EOMI, no proptosis or lid retraction  Neck: no thyromegaly or palpable thyroid nodules   Respiratory: lungs CTAB.  Cardiovascular: regular rhythm, normal S1 and S2, no audible murmurs, + peripheral edema  GI: soft, +left sided colostomy bag (watery stool)  Neurology: no tremors, DTR 2+  Skin: no visible rashes/lesions, +right PICC  Psychiatric: AAO x 3, normal affect/mood.  Ext: radial pulses intact, DP pulses intact, extremities warm, no cyanosis, clubbing, +edema.     LABS:                        9.1    6.36  )-----------( 250      ( 22 Mar 2019 09:51 )             29.6     03-22    139  |  100  |  28<H>  ----------------------------<  110<H>  5.0   |  26  |  0.64    Ca    10.4      22 Mar 2019 16:42  Phos  4.4     03-22  Mg     2.4     03-22    TPro  7.5  /  Alb  3.5  /  TBili  0.3  /  DBili  x   /  AST  17  /  ALT  15  /  AlkPhos  140<H>  03-22    HbA1C: 6.9 % (02-23 @ 07:39)    CAPILLARY BLOOD GLUCOSE  POCT Blood Glucose.: 101 mg/dL (22 Mar 2019 17:21)  POCT Blood Glucose.: 102 mg/dL (22 Mar 2019 12:16)  POCT Blood Glucose.: 153 mg/dL (22 Mar 2019 07:29)  POCT Blood Glucose.: 189 mg/dL (21 Mar 2019 21:43)    A/P: 60yo F with stage IV endometrial adenocarcinoma s/p staging surgery '18 and 1 round of chemotherapy 2/19 admitted for management of symptomatic rectovaginal and enterovaginal fistulas, now with healing complex fistula associated bacteruria, hemodynamically stable and afebrile, joseph in place for overflow incontinence, re-started on anastrozole for treatment (3/16-). Patient has had significant total urine output over last few days over 2 liters. Hourly is it not exceeding 250 cc's/hour. Serum sodium has been maintained between 138-142 throughout admission.     1. Polyuria   -At this time, would continue to rule out diabetes insipidus by doing a water deprivation test at this time as outlined in Dr. Robles's previous note.    2. Diabetes -A1c 6.9%, controlled   -Would continue moderate SS at this time   -When patient's clears are resumed, please switch to diabetic clears   -Check Vit D level     Case d/w Dr. Morillo, GYN team updated.

## 2019-03-22 NOTE — PROGRESS NOTE ADULT - SUBJECTIVE AND OBJECTIVE BOX
GYN PROGRESS NOTE PGY4    Patient evaluated at the bedside. No complaints. R/o SI ADH labs collected. NPO due to evaluation. Pt in bed but states she's going to get out of bed again soon.    T(C): 37.1 (03-22-19 @ 12:25), Max: 37.6 (03-22-19 @ 10:08)  HR: 93 (03-22-19 @ 12:25) (78 - 93)  BP: 103/72 (03-22-19 @ 12:25) (103/72 - 130/71)  RR: 17 (03-22-19 @ 12:25) (17 - 18)  SpO2: 98% (03-22-19 @ 12:25) (96% - 100%)    GEN: patient appears well  LUNGS: no respiratory distress  ABD: soft, nt, nd, ostomy in place  sacral irritation seen,  still red, no breaks in skin  EXT: no calf tenderness        03-21 @ 07:01  -  03-22 @ 07:00  --------------------------------------------------------  IN: 680 mL / OUT: 3250 mL / NET: -2570 mL    03-22 @ 07:01  -  03-22 @ 12:48  --------------------------------------------------------  IN: 0 mL / OUT: 1200 mL / NET: -1200 mL      MEDICATIONS  (STANDING):  anastrozole 1 milliGRAM(s) Oral daily  chlorhexidine 2% Cloths 1 Application(s) Topical <User Schedule>  dextrose 50% Injectable 25 Gram(s) IV Push once  enoxaparin Injectable 40 milliGRAM(s) SubCutaneous daily  famotidine  IVPB 20 milliGRAM(s) IV Intermittent two times a day  fat emulsion (Plant Based) 20% IVPB 50 Gram(s) IV Intermittent once  insulin lispro (HumaLOG) corrective regimen sliding scale   SubCutaneous every 6 hours  nystatin Powder 1 Application(s) Topical two times a day  pantoprazole  Injectable 40 milliGRAM(s) IV Push every 24 hours  Parenteral Nutrition - Adult 1 Each TPN Continuous <Continuous>  Parenteral Nutrition - Adult 1 Each TPN Continuous <Continuous>  zinc oxide 20% Ointment 1 Application(s) Topical three times a day    MEDICATIONS  (PRN):  acetaminophen   Tablet .. 650 milliGRAM(s) Oral every 6 hours PRN Temp greater or equal to 38C (100.4F), Mild Pain (1 - 3)  glucagon  Injectable 1 milliGRAM(s) IntraMuscular once PRN Glucose LESS THAN 70 milligrams/deciliter  ibuprofen  Suspension. 600 milliGRAM(s) Oral every 6 hours PRN Moderate Pain (4 - 6)  sodium chloride 0.9% lock flush 10 milliLiter(s) IV Push every 1 hour PRN Pre/post blood products, medications, blood draw, and to maintain line patency          A/P:   1. FEN -   2. PAIN -  3.  -  4. RESP -    5. VTE prophylaxis -  6. LABS -   7. DISPO -

## 2019-03-22 NOTE — PROGRESS NOTE ADULT - SUBJECTIVE AND OBJECTIVE BOX
GYN PROGRESS NOTE PGY4    Patient evaluated at the bedside. She is on concentrated TPN overnight (the same amount but being run at a faster rate), tolerating well. She is sleeping and does not want to be woken at this time. States the sacral discomfort is slightly improved.    T(C): 36.8 (03-22-19 @ 05:44), Max: 37 (03-21-19 @ 20:00)  HR: 81 (03-22-19 @ 05:44) (81 - 89)  BP: 107/68 (03-22-19 @ 05:44) (107/68 - 123/78)  RR: 18 (03-22-19 @ 05:44) (16 - 18)  SpO2: 96% (03-22-19 @ 05:44) (96% - 100%)    GEN: patient appears well  LUNGS: no respiratory distress    03-20 @ 07:01  -  03-21 @ 07:00  --------------------------------------------------------  IN: 2659.2 mL / OUT: 5175 mL / NET: -2515.8 mL    03-21 @ 07:01  -  03-22 @ 06:25  --------------------------------------------------------  IN: 680 mL / OUT: 3250 mL / NET: -2570 mL    MEDICATIONS  (STANDING):  anastrozole 1 milliGRAM(s) Oral daily  chlorhexidine 2% Cloths 1 Application(s) Topical <User Schedule>  dextrose 50% Injectable 25 Gram(s) IV Push once  enoxaparin Injectable 40 milliGRAM(s) SubCutaneous daily  famotidine  IVPB 20 milliGRAM(s) IV Intermittent two times a day  insulin lispro (HumaLOG) corrective regimen sliding scale   SubCutaneous every 6 hours  nystatin Powder 1 Application(s) Topical two times a day  pantoprazole  Injectable 40 milliGRAM(s) IV Push every 24 hours  Parenteral Nutrition - Adult 1 Each TPN Continuous <Continuous>  zinc oxide 20% Ointment 1 Application(s) Topical three times a day    MEDICATIONS  (PRN):  acetaminophen   Tablet .. 650 milliGRAM(s) Oral every 6 hours PRN Temp greater or equal to 38C (100.4F), Mild Pain (1 - 3)  glucagon  Injectable 1 milliGRAM(s) IntraMuscular once PRN Glucose LESS THAN 70 milligrams/deciliter  ibuprofen  Suspension. 600 milliGRAM(s) Oral every 6 hours PRN Moderate Pain (4 - 6)  sodium chloride 0.9% lock flush 10 milliLiter(s) IV Push every 1 hour PRN Pre/post blood products, medications, blood draw, and to maintain line patency

## 2019-03-22 NOTE — PROGRESS NOTE ADULT - ASSESSMENT
60yo F with stage IV endometrial adenocarcinoma s/p staging surgery '18 and 1 round of chemotherapy 2/19 admitted for management of symptomatic rectovaginal and enterovaginal fistulas, now with healing complex fistula associated bacteruria, hemodynamically stable and afebrile, joseph in place for overflow incontinence, re-started on anastrozole for treatment (3/16-).    1. ID: urine culture from 2/27 positive for ESBL E. coli, associated due to complex fistula, s/p bactrim DS BID on 3/1-3/8, repeat urine cx 3/6 w/ MRSA and ESBL proteus resistant to TMP-SMX  - ID reconsulted for fever 3/12, recommend 7 day course of IV vanc and ertepenem, then no oral antibiotics needed, then wait 72 hours then re-culture urine to assess "contact" status. discontinued IV ABX 3/18 as course was completed, repeat urine culture sent yesterday  2. FEN/GI - clears, IVH, replete electrolytes PRN, octreotide daily on TPN, PICC placed 2/27, electrolytes repleted via TPN, lipids every other day, plan to run TPN over 12 hours each night  3. PAIN - overall well controlled, OPM and IV prn, occasional abdominal and knee pain  4.  - joseph placed given urinary retention 3/16; Urology consulted 3/18, recommend continuing joseph, could transition to intermittent catheterization, urodynamics as an outpatient.  - no current active leaking of stool  - continue with nystatin powder and ointment for mons and labial irritation as well as zinc oxide  - careful monitoring urine output to monitor for signs of post obstructive diuresis  - CT abdomen/pelvis w/ IV and oral contrast performed 3/15 showed improved enterovaginal fistula, no longer contrast in vagina  - continue with anastrozole for treatment   5. Endocrine- ISS, holding home metformin at this time, glucose in TPN decreased slightly yesterday, monitor FS  Endocrine consulted, undergoing  eval to r/o SI ADH  6. RESP - Saturating well on room air, encouraged ISS  7. VTE prophylaxis - SCDs, ambulate as tolerated with PT, Lovenox 40qd, PT    8. Palliative: palliative consulted, appreciate recommendations  - pt no longer desires Lexapro 10mg qhs   9. gyn malignancy  - continue anastrozole for treatment  - consider discharge to [facility] to increase patient's performance status   10. Derm: monitor sacrum for s/s of pressure ulcer. Encourage frequent changes in position and OOB during  the day

## 2019-03-22 NOTE — PROGRESS NOTE ADULT - ASSESSMENT
60yo F with stage IV endometrial adenocarcinoma s/p staging surgery '18 and 1 round of chemotherapy 2/19 admitted for management of symptomatic rectovaginal and enterovaginal fistulas, now with healing complex fistula associated bacteruria, hemodynamically stable and afebrile, joseph in place for overflow incontinence, re-started on anastrozole for treatment (3/16-).    1. ID: urine culture from 2/27 positive for ESBL E. coli, associated due to complex fistula, s/p bactrim DS BID on 3/1-3/8, repeat urine cx 3/6 w/ MRSA and ESBL proteus resistant to TMP-SMX  - ID reconsulted for fever 3/12, recommend 7 day course of IV vanc and ertepenem, then no oral antibiotics needed, then wait 72 hours then re-culture urine to assess "contact" status. discontinued IV ABX 3/18 as course was completed, repeat urine culture sent yesterday  2. FEN/GI - clears, IVH, replete electrolytes PRN, octreotide daily on TPN, PICC placed 2/27, electrolytes repleted via TPN, lipids every other day, plan to run TPN over 12 hours, but the bag ran for the 24 hours today therefore will start the 12 hour TPN tonight, spoke with pharmacist, will monitor patient for response  3. PAIN - overall well controlled, OPM and IV prn, occasional abdominal and knee pain  4.  - joseph placed given urinary retention 3/16; Urology consulted 3/18, recommend continuing joseph, could transition to intermittent catheterization, urodynamics as an outpatient.  - no current active leaking of stool  - continue with nystatin powder and ointment for mons and labial irritation as well as zinc oxide  - careful monitoring urine output to monitor for signs of post obstructive diuresis  - CT abdomen/pelvis w/ IV and oral contrast performed 3/15 showed improved enterovaginal fistula, no longer contrast in vagina  - continue with anastrozole for treatment   5. Endocrine- ISS, holding home metformin at this time, glucose in TPN decreased slightly yesterday, monitor FS  Endocrine consulted today to evaluate high clear urine output. Low suspicion for diabetes insipidus, but recommend work up: at 5am when overnight TPN is completed, draw baseline labs: BMP, serum osm, urine osm. THEN obtain these labs every 4 hours for 12 hours. With the first blood draw, get a Vitamin D serum level. Patient must be completely fluid restricted during this time. They also recommend changing patient to diabetic clears which was done. Will be NPO at after TPN stops.  6. RESP - Saturating well on room air, encouraged ISS  7. VTE prophylaxis - SCDs, ambulate as tolerated with PT, Lovenox 40qd, PT    8. Palliative: palliative consulted, appreciate recommendations  - pt no longer desires Lexapro 10mg qhs   9. gyn malignancy  - continue anastrozole for treatment  - consider discharge to [facility] to increase patient's performance status 60yo F with stage IV endometrial adenocarcinoma s/p staging surgery '18 and 1 round of chemotherapy 2/19 admitted for management of symptomatic rectovaginal and enterovaginal fistulas, now with healing complex fistula associated bacteruria, hemodynamically stable and afebrile, joseph in place for overflow incontinence, re-started on anastrozole for treatment (3/16-).    1. ID: urine culture from 2/27 positive for ESBL E. coli, associated due to complex fistula, s/p bactrim DS BID on 3/1-3/8, repeat urine cx 3/6 w/ MRSA and ESBL proteus resistant to TMP-SMX  - ID reconsulted for fever 3/12, recommend 7 day course of IV vanc and ertepenem, then no oral antibiotics needed, then wait 72 hours then re-culture urine to assess "contact" status. discontinued IV ABX 3/18 as course was completed, repeat urine culture sent yesterday  2. FEN/GI - clears, IVH, replete electrolytes PRN, octreotide daily on TPN, PICC placed 2/27, electrolytes repleted via TPN, lipids every other day, plan to run TPN over 12 hours, but the bag ran for the 24 hours today therefore will start the 12 hour TPN tonight, spoke with pharmacist, will monitor patient for response  3. PAIN - overall well controlled, OPM and IV prn, occasional abdominal and knee pain  4.  - joseph placed given urinary retention 3/16; Urology consulted 3/18, recommend continuing joseph, could transition to intermittent catheterization, urodynamics as an outpatient.  - no current active leaking of stool  - continue with nystatin powder and ointment for mons and labial irritation as well as zinc oxide  - careful monitoring urine output to monitor for signs of post obstructive diuresis  - CT abdomen/pelvis w/ IV and oral contrast performed 3/15 showed improved enterovaginal fistula, no longer contrast in vagina  - continue with anastrozole for treatment   5. Endocrine- ISS, holding home metformin at this time, glucose in TPN decreased slightly yesterday, monitor FS  Endocrine consulted today to evaluate high clear urine output. Low suspicion for diabetes insipidus, but recommend work up: at 5am when overnight TPN is completed, draw baseline labs: BMP, serum osm, urine osm. THEN obtain these labs every 4 hours for 12 hours. With the first blood draw, get a Vitamin D serum level. Patient must be completely fluid restricted during this time. They also recommend changing patient to diabetic clears which was done. Will be NPO at after TPN stops.  6. RESP - Saturating well on room air, encouraged ISS  7. VTE prophylaxis - SCDs, ambulate as tolerated with PT, Lovenox 40qd, PT    8. Palliative: palliative consulted, appreciate recommendations  - pt no longer desires Lexapro 10mg qhs   9. gyn malignancy  - continue anastrozole for treatment  - consider discharge to [facility] to increase patient's performance status   10. Derm: monitor sacrum for s/s of pressure ulcer. Encourage frequent changes in position and OOB during  the day

## 2019-03-23 LAB
ANION GAP SERPL CALC-SCNC: 14 MMOL/L — SIGNIFICANT CHANGE UP (ref 5–17)
BASOPHILS # BLD AUTO: 0.03 K/UL — SIGNIFICANT CHANGE UP (ref 0–0.2)
BASOPHILS NFR BLD AUTO: 0.5 % — SIGNIFICANT CHANGE UP (ref 0–2)
BUN SERPL-MCNC: 27 MG/DL — HIGH (ref 7–23)
CALCIUM SERPL-MCNC: 10 MG/DL — SIGNIFICANT CHANGE UP (ref 8.4–10.5)
CHLORIDE SERPL-SCNC: 100 MMOL/L — SIGNIFICANT CHANGE UP (ref 96–108)
CO2 SERPL-SCNC: 26 MMOL/L — SIGNIFICANT CHANGE UP (ref 22–31)
CREAT SERPL-MCNC: 0.66 MG/DL — SIGNIFICANT CHANGE UP (ref 0.5–1.3)
EOSINOPHIL # BLD AUTO: 0.18 K/UL — SIGNIFICANT CHANGE UP (ref 0–0.5)
EOSINOPHIL NFR BLD AUTO: 2.9 % — SIGNIFICANT CHANGE UP (ref 0–6)
GLUCOSE BLDC GLUCOMTR-MCNC: 127 MG/DL — HIGH (ref 70–99)
GLUCOSE BLDC GLUCOMTR-MCNC: 142 MG/DL — HIGH (ref 70–99)
GLUCOSE BLDC GLUCOMTR-MCNC: 142 MG/DL — HIGH (ref 70–99)
GLUCOSE BLDC GLUCOMTR-MCNC: 144 MG/DL — HIGH (ref 70–99)
GLUCOSE BLDC GLUCOMTR-MCNC: 145 MG/DL — HIGH (ref 70–99)
GLUCOSE BLDC GLUCOMTR-MCNC: 235 MG/DL — HIGH (ref 70–99)
GLUCOSE SERPL-MCNC: 160 MG/DL — HIGH (ref 70–99)
HCT VFR BLD CALC: 29.5 % — LOW (ref 34.5–45)
HGB BLD-MCNC: 9.3 G/DL — LOW (ref 11.5–15.5)
IMM GRANULOCYTES NFR BLD AUTO: 0.3 % — SIGNIFICANT CHANGE UP (ref 0–1.5)
LYMPHOCYTES # BLD AUTO: 1.57 K/UL — SIGNIFICANT CHANGE UP (ref 1–3.3)
LYMPHOCYTES # BLD AUTO: 25.4 % — SIGNIFICANT CHANGE UP (ref 13–44)
MAGNESIUM SERPL-MCNC: 1.8 MG/DL — SIGNIFICANT CHANGE UP (ref 1.6–2.6)
MCHC RBC-ENTMCNC: 30.7 PG — SIGNIFICANT CHANGE UP (ref 27–34)
MCHC RBC-ENTMCNC: 31.5 GM/DL — LOW (ref 32–36)
MCV RBC AUTO: 97.4 FL — SIGNIFICANT CHANGE UP (ref 80–100)
MONOCYTES # BLD AUTO: 0.59 K/UL — SIGNIFICANT CHANGE UP (ref 0–0.9)
MONOCYTES NFR BLD AUTO: 9.6 % — SIGNIFICANT CHANGE UP (ref 2–14)
NEUTROPHILS # BLD AUTO: 3.78 K/UL — SIGNIFICANT CHANGE UP (ref 1.8–7.4)
NEUTROPHILS NFR BLD AUTO: 61.3 % — SIGNIFICANT CHANGE UP (ref 43–77)
NRBC # BLD: 0 /100 WBCS — SIGNIFICANT CHANGE UP (ref 0–0)
OSMOLALITY UR: 537 MOSMOL/KG — SIGNIFICANT CHANGE UP (ref 100–650)
OSMOLALITY UR: 552 MOSMOL/KG — SIGNIFICANT CHANGE UP (ref 100–650)
OSMOLALITY UR: 574 MOSMOL/KG — SIGNIFICANT CHANGE UP (ref 100–650)
OSMOLALITY UR: 583 MOSMOL/KG — SIGNIFICANT CHANGE UP (ref 100–650)
PHOSPHATE SERPL-MCNC: 6.1 MG/DL — HIGH (ref 2.5–4.5)
PLATELET # BLD AUTO: 267 K/UL — SIGNIFICANT CHANGE UP (ref 150–400)
POTASSIUM SERPL-MCNC: 4.3 MMOL/L — SIGNIFICANT CHANGE UP (ref 3.5–5.3)
POTASSIUM SERPL-SCNC: 4.3 MMOL/L — SIGNIFICANT CHANGE UP (ref 3.5–5.3)
RBC # BLD: 3.03 M/UL — LOW (ref 3.8–5.2)
RBC # FLD: 17.2 % — HIGH (ref 10.3–14.5)
SODIUM SERPL-SCNC: 140 MMOL/L — SIGNIFICANT CHANGE UP (ref 135–145)
WBC # BLD: 6.17 K/UL — SIGNIFICANT CHANGE UP (ref 3.8–10.5)
WBC # FLD AUTO: 6.17 K/UL — SIGNIFICANT CHANGE UP (ref 3.8–10.5)

## 2019-03-23 PROCEDURE — 99232 SBSQ HOSP IP/OBS MODERATE 35: CPT

## 2019-03-23 PROCEDURE — 99231 SBSQ HOSP IP/OBS SF/LOW 25: CPT

## 2019-03-23 RX ORDER — ELECTROLYTE SOLUTION,INJ
1 VIAL (ML) INTRAVENOUS
Qty: 0 | Refills: 0 | Status: DISCONTINUED | OUTPATIENT
Start: 2019-03-23 | End: 2019-03-23

## 2019-03-23 RX ORDER — SODIUM CHLORIDE 9 MG/ML
1000 INJECTION, SOLUTION INTRAVENOUS
Qty: 0 | Refills: 0 | Status: DISCONTINUED | OUTPATIENT
Start: 2019-03-23 | End: 2019-03-23

## 2019-03-23 RX ORDER — ACETAMINOPHEN 500 MG
1000 TABLET ORAL ONCE
Qty: 0 | Refills: 0 | Status: COMPLETED | OUTPATIENT
Start: 2019-03-23 | End: 2019-03-23

## 2019-03-23 RX ORDER — NYSTATIN CREAM 100000 [USP'U]/G
1 CREAM TOPICAL EVERY 12 HOURS
Qty: 0 | Refills: 0 | Status: DISCONTINUED | OUTPATIENT
Start: 2019-03-23 | End: 2019-03-25

## 2019-03-23 RX ORDER — I.V. FAT EMULSION 20 G/100ML
0.81 EMULSION INTRAVENOUS
Qty: 50 | Refills: 0 | Status: DISCONTINUED | OUTPATIENT
Start: 2019-03-23 | End: 2019-03-23

## 2019-03-23 RX ADMIN — ZINC OXIDE 1 APPLICATION(S): 200 OINTMENT TOPICAL at 05:57

## 2019-03-23 RX ADMIN — Medication 1 EACH: at 18:33

## 2019-03-23 RX ADMIN — ENOXAPARIN SODIUM 40 MILLIGRAM(S): 100 INJECTION SUBCUTANEOUS at 12:48

## 2019-03-23 RX ADMIN — ANASTROZOLE 1 MILLIGRAM(S): 1 TABLET ORAL at 12:48

## 2019-03-23 RX ADMIN — Medication 400 MILLIGRAM(S): at 01:06

## 2019-03-23 RX ADMIN — FAMOTIDINE 104 MILLIGRAM(S): 10 INJECTION INTRAVENOUS at 17:45

## 2019-03-23 RX ADMIN — SODIUM CHLORIDE 90 MILLILITER(S): 9 INJECTION, SOLUTION INTRAVENOUS at 18:33

## 2019-03-23 RX ADMIN — Medication 1000 MILLIGRAM(S): at 01:21

## 2019-03-23 RX ADMIN — SODIUM CHLORIDE 90 MILLILITER(S): 9 INJECTION, SOLUTION INTRAVENOUS at 02:46

## 2019-03-23 RX ADMIN — ZINC OXIDE 1 APPLICATION(S): 200 OINTMENT TOPICAL at 18:33

## 2019-03-23 RX ADMIN — FAMOTIDINE 104 MILLIGRAM(S): 10 INJECTION INTRAVENOUS at 05:59

## 2019-03-23 RX ADMIN — NYSTATIN CREAM 1 APPLICATION(S): 100000 CREAM TOPICAL at 05:57

## 2019-03-23 RX ADMIN — PANTOPRAZOLE SODIUM 40 MILLIGRAM(S): 20 TABLET, DELAYED RELEASE ORAL at 05:57

## 2019-03-23 RX ADMIN — SODIUM CHLORIDE 90 MILLILITER(S): 9 INJECTION, SOLUTION INTRAVENOUS at 17:46

## 2019-03-23 RX ADMIN — CHLORHEXIDINE GLUCONATE 1 APPLICATION(S): 213 SOLUTION TOPICAL at 05:56

## 2019-03-23 RX ADMIN — ZINC OXIDE 1 APPLICATION(S): 200 OINTMENT TOPICAL at 22:50

## 2019-03-23 RX ADMIN — I.V. FAT EMULSION 20.85 GM/KG/DAY: 20 EMULSION INTRAVENOUS at 22:46

## 2019-03-23 RX ADMIN — Medication 4: at 21:39

## 2019-03-23 RX ADMIN — NYSTATIN CREAM 1 APPLICATION(S): 100000 CREAM TOPICAL at 18:33

## 2019-03-23 NOTE — PROGRESS NOTE ADULT - ASSESSMENT
58yo F with stage IV endometrial adenocarcinoma s/p staging surgery '18 and 1 round of chemotherapy 2/19 admitted for management of symptomatic rectovaginal and enterovaginal fistulas, now with healing complex fistula associated bacteruria, hemodynamically stable and afebrile, joseph in place for overflow incontinence, re-started on anastrozole for treatment (3/16-).    1. ID: urine culture from 2/27 positive for ESBL E. coli, associated due to complex fistula, s/p bactrim DS BID on 3/1-3/8, repeat urine cx 3/6 w/ MRSA and ESBL proteus resistant to TMP-SMX' repeat urine culture 3/21 negative -- can discontinue contact  - ID reconsulted for fever 3/12, recommend 7 day course of IV vanc and ertepenem, then no oral antibiotics needed, then wait 72 hours then re-culture urine to assess "contact" status. discontinued IV ABX 3/18 as course was completed, repeat urine culture neg  2. FEN/GI -  IVH, replete electrolytes PRN, octreotide daily on TPN, PICC placed 2/27, electrolytes repleted via TPN, lipids every other day, plan to run TPN today during day   3. PAIN - overall well controlled, OPM and IV prn, occasional abdominal and knee pain  4.  - joseph placed given urinary retention 3/16; Urology consulted 3/18, recommend continuing joseph, could transition to intermittent catheterization, urodynamics as an outpatient.  - no current active leaking of stool  - continue with nystatin powder and ointment for mons and labial irritation as well as zinc oxide  - careful monitoring urine output to monitor for signs of post obstructive diuresis  - CT abdomen/pelvis w/ IV and oral contrast performed 3/15 showed improved enterovaginal fistula, no longer contrast in vagina  - continue with anastrozole for treatment   5. Endocrine- ISS, holding home metformin at this time, glucose in TPN decreased slightly yesterday, monitor FS  Endocrine consulted, completed eval to r/o SI ADH -- will discuss results with endocrine team today  6. RESP - Saturating well on room air, encouraged ISS  7. VTE prophylaxis - SCDs, ambulate as tolerated with PT, Lovenox 40qd, PT    8. Palliative: palliative consulted, appreciate recommendations  - pt no longer desires Lexapro 10mg qhs   9. gyn malignancy  - continue anastrozole for treatment  - consider discharge to [facility] to increase patient's performance status   10. Derm: monitor sacrum for s/s of pressure ulcer. Encourage frequent changes in position and OOB during  the day

## 2019-03-23 NOTE — PROGRESS NOTE ADULT - SUBJECTIVE AND OBJECTIVE BOX
Pt sitting in chair.  not in pain.    has not been drinking, only small amounts to take her meds.      MEDICATIONS  (STANDING):  anastrozole 1 milliGRAM(s) Oral daily  chlorhexidine 2% Cloths 1 Application(s) Topical <User Schedule>  dextrose 5% + lactated ringers. 1000 milliLiter(s) (90 mL/Hr) IV Continuous <Continuous>  dextrose 50% Injectable 25 Gram(s) IV Push once  enoxaparin Injectable 40 milliGRAM(s) SubCutaneous daily  famotidine  IVPB 20 milliGRAM(s) IV Intermittent two times a day  fat emulsion (Plant Based) 20% Infusion 0.811 Gm/kG/Day (20.849 mL/Hr) IV Continuous <Continuous>  insulin lispro (HumaLOG) corrective regimen sliding scale   SubCutaneous every 6 hours  lactated ringers. 1000 milliLiter(s) (90 mL/Hr) IV Continuous <Continuous>  nystatin Powder 1 Application(s) Topical two times a day  pantoprazole  Injectable 40 milliGRAM(s) IV Push every 24 hours  Parenteral Nutrition - Adult 1 Each TPN Continuous <Continuous>  zinc oxide 20% Ointment 1 Application(s) Topical three times a day    MEDICATIONS  (PRN):  acetaminophen   Tablet .. 650 milliGRAM(s) Oral every 6 hours PRN Temp greater or equal to 38C (100.4F), Mild Pain (1 - 3)  glucagon  Injectable 1 milliGRAM(s) IntraMuscular once PRN Glucose LESS THAN 70 milligrams/deciliter  ibuprofen  Suspension. 600 milliGRAM(s) Oral every 6 hours PRN Moderate Pain (4 - 6)  sodium chloride 0.9% lock flush 10 milliLiter(s) IV Push every 1 hour PRN Pre/post blood products, medications, blood draw, and to maintain line patency      PHYSICAL EXAM  Vital Signs Last 24 Hrs  T(C): 36.6 (23 Mar 2019 04:15), Max: 37.2 (22 Mar 2019 20:00)  T(F): 97.8 (23 Mar 2019 04:15), Max: 98.9 (22 Mar 2019 20:00)  HR: 86 (23 Mar 2019 11:45) (83 - 103)  BP: 123/81 (23 Mar 2019 11:45) (79/58 - 123/81)  BP(mean): --  RR: 18 (23 Mar 2019 04:15) (18 - 18)  SpO2: 94% (23 Mar 2019 04:15) (94% - 99%)      LABS:                        9.3    6.17  )-----------( 267      ( 23 Mar 2019 10:16 )             29.5     03-23    140  |  100  |  27<H>  ----------------------------<  160<H>  4.3   |  26  |  0.66    Ca    10.0      23 Mar 2019 10:16  Phos  6.1     03-23  Mg     1.8     03-23    TPro  8.4<H>  /  Alb  3.8  /  TBili  0.4  /  DBili  x   /  AST  32  /  ALT  23  /  AlkPhos  144<H>  03-22      HbA1C: 6.9 % (02-23 @ 07:39)    CAPILLARY BLOOD GLUCOSE    POCT Blood Glucose.: 145 mg/dL (23 Mar 2019 10:00)  POCT Blood Glucose.: 142 mg/dL (23 Mar 2019 08:04)  POCT Blood Glucose.: 142 mg/dL (23 Mar 2019 04:07)  POCT Blood Glucose.: 127 mg/dL (23 Mar 2019 00:13)  POCT Blood Glucose.: 101 mg/dL (22 Mar 2019 17:21)      Na stable, 137-140 for past 2 days  serum osm  291-298  urine osm  [phone] and appears concentrated in bag

## 2019-03-23 NOTE — PROGRESS NOTE ADULT - SUBJECTIVE AND OBJECTIVE BOX
Patient evaluated at the bedside. No complaints. R/o SI ADH labs collected overnight. Started on maintenance fluids overnight due to inability to obtain TPN overnight.     T(F): 97.8 (03-23-19 @ 04:15), Max: 99.6 (03-22-19 @ 10:08)  HR: 83 (03-23-19 @ 04:15) (78 - 103)  BP: 90/60 (03-23-19 @ 04:15) (79/58 - 130/71)  RR: 18 (03-23-19 @ 04:15) (17 - 18)  SpO2: 94% (03-23-19 @ 04:15) (94% - 99%)  Wt(kg): --  I&O's Summary    22 Mar 2019 07:01  -  23 Mar 2019 07:00  --------------------------------------------------------  IN: 540 mL / OUT: 2905 mL / NET: -2365 mL        MEDICATIONS  (STANDING):  anastrozole 1 milliGRAM(s) Oral daily  chlorhexidine 2% Cloths 1 Application(s) Topical <User Schedule>  dextrose 5% + lactated ringers. 1000 milliLiter(s) (90 mL/Hr) IV Continuous <Continuous>  dextrose 50% Injectable 25 Gram(s) IV Push once  enoxaparin Injectable 40 milliGRAM(s) SubCutaneous daily  famotidine  IVPB 20 milliGRAM(s) IV Intermittent two times a day  insulin lispro (HumaLOG) corrective regimen sliding scale   SubCutaneous every 6 hours  lactated ringers. 1000 milliLiter(s) (90 mL/Hr) IV Continuous <Continuous>  nystatin Powder 1 Application(s) Topical two times a day  pantoprazole  Injectable 40 milliGRAM(s) IV Push every 24 hours  zinc oxide 20% Ointment 1 Application(s) Topical three times a day    MEDICATIONS  (PRN):  acetaminophen   Tablet .. 650 milliGRAM(s) Oral every 6 hours PRN Temp greater or equal to 38C (100.4F), Mild Pain (1 - 3)  glucagon  Injectable 1 milliGRAM(s) IntraMuscular once PRN Glucose LESS THAN 70 milligrams/deciliter  ibuprofen  Suspension. 600 milliGRAM(s) Oral every 6 hours PRN Moderate Pain (4 - 6)  sodium chloride 0.9% lock flush 10 milliLiter(s) IV Push every 1 hour PRN Pre/post blood products, medications, blood draw, and to maintain line patency      GEN: patient appears well  LUNGS: no respiratory distress  ABD: soft, nt, nd, ostomy in place  sacral irritation seen,  still red, no breaks in skin  EXT: no calf tenderness      LABS:                        10.3   9.02  )-----------( 367      ( 22 Mar 2019 18:24 )             32.7     03-22    138  |  100  |  30<H>  ----------------------------<  129<H>  4.9   |  24  |  0.69    Ca    10.2      22 Mar 2019 20:14  Phos  5.9     03-22  Mg     2.3     03-22    TPro  8.4<H>  /  Alb  3.8  /  TBili  0.4  /  DBili  x   /  AST  32  /  ALT  23  /  AlkPhos  144<H>  03-22

## 2019-03-23 NOTE — PROGRESS NOTE ADULT - ASSESSMENT
Diabetes, sugars in acceptable range (goal  for inpatient).  continue sliding scale coverage  Polyuria.  No DI.  able to concentrate urine.  Sodium and serum osm levels stable.  TPN to be restarted tonight in smaller volume.

## 2019-03-23 NOTE — PROGRESS NOTE ADULT - SUBJECTIVE AND OBJECTIVE BOX
GYN PROGRESS NOTE    Patient evaluated at the bedside. No acute events. Patient has no complaints at this time. Denies CP/SOB/dizziness/nausea/vomiting/abdominal pain/calf pain. Patient feels tired and wants to rest now.   O:   T(C): 36.7 (03-23-19 @ 20:30), Max: 37.7 (03-23-19 @ 16:57)  HR: 89 (03-23-19 @ 20:30) (77 - 94)  BP: 100/70 (03-23-19 @ 20:30) (90/60 - 123/81)  RR: 16 (03-23-19 @ 20:30) (16 - 18)  SpO2: 98% (03-23-19 @ 20:30) (94% - 100%)  Wt(kg): --    GEN: patient appears well, resting comfortably   LUNGS: no respiratory distress  ABD: soft, nontender   Pelvic: joseph in place draining clear urine, area of perianal irritation but no skin breakdown identified   EXT: no calf tenderness        03-22 @ 07:01  -  03-23 @ 07:00  --------------------------------------------------------  IN: 540 mL / OUT: 2905 mL / NET: -2365 mL    03-23 @ 07:01  -  03-24 @ 03:20  --------------------------------------------------------  IN: 2231 mL / OUT: 700 mL / NET: 1531 mL

## 2019-03-23 NOTE — PROGRESS NOTE ADULT - ASSESSMENT
60yo F with stage IV endometrial adenocarcinoma s/p staging surgery '18 and 1 round of chemotherapy 2/19 admitted for management of symptomatic rectovaginal and enterovaginal fistulas, now with healing complex fistula associated bacteruria, hemodynamically stable and afebrile, joseph in place for overflow incontinence, re-started on anastrozole for treatment (3/16-).    1. ID: urine culture from 2/27 positive for ESBL E. coli, associated due to complex fistula, s/p bactrim DS BID on 3/1-3/8, repeat urine cx 3/6 w/ MRSA and ESBL proteus resistant to TMP-SMX' repeat urine culture 3/21 negative -- contact precautions discontinued.   2. FEN/GI -  IVH, replete electrolytes PRN, octreotide daily in TPN, PICC placed 2/27, electrolytes repleted via TPN, lipids every other day, plan to run TPN today during day   3. PAIN - overall well controlled, OPM and IV prn, occasional abdominal and knee pain  4.  - joseph placed given urinary retention 3/16; Urology consulted 3/18, recommend continuing joseph, could transition to intermittent catheterization, urodynamics as an outpatient.  - no current active leaking of stool  - perianal irritation consistent with possible fungal infection- switched to nystatin cream today; apply to affected area    - careful monitoring urine output   - CT abdomen/pelvis w/ IV and oral contrast performed 3/15 showed improved enterovaginal fistula, no longer contrast in vagina  - continue with anastrozole for treatment   5. Endocrine- ISS, holding home metformin at this time, glucose in TPN decreased slightly yesterday, monitor FS; clinical picture not consistent with Diabetes Insipidus per endocrine  6. RESP - Saturating well on room air, encouraged ISS  7. VTE prophylaxis - SCDs, ambulate as tolerated with PT, Lovenox 40qd, PT    8. gyn malignancy  - continue anastrozole for treatment  - consider discharge to [facility] to increase patient's performance status   9. Derm: monitor sacrum for s/s of pressure ulcer. Encourage frequent changes in position and OOB during  the day

## 2019-03-24 LAB
ANION GAP SERPL CALC-SCNC: 11 MMOL/L — SIGNIFICANT CHANGE UP (ref 5–17)
BUN SERPL-MCNC: 28 MG/DL — HIGH (ref 7–23)
CALCIUM SERPL-MCNC: 9.5 MG/DL — SIGNIFICANT CHANGE UP (ref 8.4–10.5)
CHLORIDE SERPL-SCNC: 99 MMOL/L — SIGNIFICANT CHANGE UP (ref 96–108)
CO2 SERPL-SCNC: 25 MMOL/L — SIGNIFICANT CHANGE UP (ref 22–31)
CREAT SERPL-MCNC: 0.55 MG/DL — SIGNIFICANT CHANGE UP (ref 0.5–1.3)
GLUCOSE BLDC GLUCOMTR-MCNC: 104 MG/DL — HIGH (ref 70–99)
GLUCOSE BLDC GLUCOMTR-MCNC: 148 MG/DL — HIGH (ref 70–99)
GLUCOSE BLDC GLUCOMTR-MCNC: 224 MG/DL — HIGH (ref 70–99)
GLUCOSE BLDC GLUCOMTR-MCNC: 229 MG/DL — HIGH (ref 70–99)
GLUCOSE SERPL-MCNC: 161 MG/DL — HIGH (ref 70–99)
HCT VFR BLD CALC: 28.6 % — LOW (ref 34.5–45)
HGB BLD-MCNC: 8.9 G/DL — LOW (ref 11.5–15.5)
MAGNESIUM SERPL-MCNC: 2 MG/DL — SIGNIFICANT CHANGE UP (ref 1.6–2.6)
MCHC RBC-ENTMCNC: 30.2 PG — SIGNIFICANT CHANGE UP (ref 27–34)
MCHC RBC-ENTMCNC: 31.1 GM/DL — LOW (ref 32–36)
MCV RBC AUTO: 96.9 FL — SIGNIFICANT CHANGE UP (ref 80–100)
NRBC # BLD: 0 /100 WBCS — SIGNIFICANT CHANGE UP (ref 0–0)
PHOSPHATE SERPL-MCNC: 3.3 MG/DL — SIGNIFICANT CHANGE UP (ref 2.5–4.5)
PLATELET # BLD AUTO: 264 K/UL — SIGNIFICANT CHANGE UP (ref 150–400)
POTASSIUM SERPL-MCNC: 4.4 MMOL/L — SIGNIFICANT CHANGE UP (ref 3.5–5.3)
POTASSIUM SERPL-SCNC: 4.4 MMOL/L — SIGNIFICANT CHANGE UP (ref 3.5–5.3)
RBC # BLD: 2.95 M/UL — LOW (ref 3.8–5.2)
RBC # FLD: 16.4 % — HIGH (ref 10.3–14.5)
SODIUM SERPL-SCNC: 135 MMOL/L — SIGNIFICANT CHANGE UP (ref 135–145)
WBC # BLD: 5.89 K/UL — SIGNIFICANT CHANGE UP (ref 3.8–10.5)
WBC # FLD AUTO: 5.89 K/UL — SIGNIFICANT CHANGE UP (ref 3.8–10.5)

## 2019-03-24 RX ORDER — ELECTROLYTE SOLUTION,INJ
1 VIAL (ML) INTRAVENOUS
Qty: 0 | Refills: 0 | Status: DISCONTINUED | OUTPATIENT
Start: 2019-03-24 | End: 2019-03-24

## 2019-03-24 RX ADMIN — Medication 600 MILLIGRAM(S): at 22:45

## 2019-03-24 RX ADMIN — FAMOTIDINE 104 MILLIGRAM(S): 10 INJECTION INTRAVENOUS at 06:33

## 2019-03-24 RX ADMIN — FAMOTIDINE 104 MILLIGRAM(S): 10 INJECTION INTRAVENOUS at 17:28

## 2019-03-24 RX ADMIN — ANASTROZOLE 1 MILLIGRAM(S): 1 TABLET ORAL at 11:18

## 2019-03-24 RX ADMIN — NYSTATIN CREAM 1 APPLICATION(S): 100000 CREAM TOPICAL at 06:32

## 2019-03-24 RX ADMIN — Medication 4: at 04:12

## 2019-03-24 RX ADMIN — CHLORHEXIDINE GLUCONATE 1 APPLICATION(S): 213 SOLUTION TOPICAL at 06:32

## 2019-03-24 RX ADMIN — ZINC OXIDE 1 APPLICATION(S): 200 OINTMENT TOPICAL at 06:33

## 2019-03-24 RX ADMIN — Medication 4: at 21:44

## 2019-03-24 RX ADMIN — PANTOPRAZOLE SODIUM 40 MILLIGRAM(S): 20 TABLET, DELAYED RELEASE ORAL at 06:33

## 2019-03-24 RX ADMIN — ZINC OXIDE 1 APPLICATION(S): 200 OINTMENT TOPICAL at 21:40

## 2019-03-24 RX ADMIN — Medication 600 MILLIGRAM(S): at 21:44

## 2019-03-24 RX ADMIN — NYSTATIN CREAM 1 APPLICATION(S): 100000 CREAM TOPICAL at 17:29

## 2019-03-24 RX ADMIN — Medication 1 EACH: at 17:30

## 2019-03-24 RX ADMIN — ENOXAPARIN SODIUM 40 MILLIGRAM(S): 100 INJECTION SUBCUTANEOUS at 11:18

## 2019-03-24 RX ADMIN — ZINC OXIDE 1 APPLICATION(S): 200 OINTMENT TOPICAL at 13:11

## 2019-03-24 NOTE — PROGRESS NOTE ADULT - ASSESSMENT
58yo F with stage IV endometrial adenocarcinoma s/p staging surgery '18 and 1 round of chemotherapy 2/19 admitted for management of symptomatic rectovaginal and enterovaginal fistulas, now with healing complex fistula associated bacteruria, hemodynamically stable and afebrile, joseph in place for overflow incontinence, re-started on anastrozole for treatment (3/16-).    1. ID: urine culture from 2/27 positive for ESBL E. coli, associated due to complex fistula, s/p bactrim DS BID on 3/1-3/8, repeat urine cx 3/6 w/ MRSA and ESBL proteus resistant to TMP-SMX; Repeat urine culture 3/21 negative -- contact precautions discontinued.   2. FEN/GI - TPN daily with daily labs; replete electrolytes PRN, octreotide daily in TPN, PICC placed 2/27, lipids every other day; Clears as tolerated for comfort   3. PAIN - overall well controlled, OPM and IV prn, occasional abdominal and knee pain  4.  - joseph placed given urinary retention 3/16; Urology consulted 3/18, recommend continuing joseph, could transition to intermittent catheterization, urodynamics as an outpatient.  - Phosphorus elevated - No phosphorus in TPN. Will follow closely.   - no current active leaking of stool  - perianal irritation consistent with possible fungal infection- switched to nystatin cream 3/23; apply to affected area    - careful monitoring urine output   - CT abdomen/pelvis w/ IV and oral contrast performed 3/15 showed improved enterovaginal fistula, no longer contrast in vagina  - continue with anastrozole for treatment   5. Endocrine- ISS, holding home metformin at this time, monitor FS; clinical picture not consistent with Diabetes Insipidus per endocrine  6. RESP - Saturating well on room air, encouraged ISS  7. VTE prophylaxis - SCDs, ambulate as tolerated with PT, Lovenox 40qd, PT    8. gyn malignancy  - continue anastrozole for treatment  - consider discharge to [facility] to increase patient's performance status   9. Derm: monitor sacrum for s/s of pressure ulcer. Encourage frequent changes in position and OOB during  the day

## 2019-03-24 NOTE — PROGRESS NOTE ADULT - SUBJECTIVE AND OBJECTIVE BOX
Pt seen and examined at bedside. No acute events. Patient denies CP/SOB/dizziness/nausea/vomiting/abdominal pain/calf pain. Says she wants to walk today.    T(F): 98.3 (03-24-19 @ 13:31), Max: 99.8 (03-23-19 @ 16:57)  HR: 89 (03-24-19 @ 13:31) (84 - 94)  BP: 114/72 (03-24-19 @ 13:31) (94/64 - 114/72)  RR: 16 (03-24-19 @ 13:31) (16 - 18)  SpO2: 96% (03-24-19 @ 13:31) (96% - 100%)  Wt(kg): --  I&O's Summary    23 Mar 2019 07:01  -  24 Mar 2019 07:00  --------------------------------------------------------  IN: 3556 mL / OUT: 1975 mL / NET: 1581 mL    24 Mar 2019 07:01  -  24 Mar 2019 15:59  --------------------------------------------------------  IN: 550 mL / OUT: 1300 mL / NET: -750 mL        MEDICATIONS  (STANDING):  anastrozole 1 milliGRAM(s) Oral daily  chlorhexidine 2% Cloths 1 Application(s) Topical <User Schedule>  dextrose 50% Injectable 25 Gram(s) IV Push once  enoxaparin Injectable 40 milliGRAM(s) SubCutaneous daily  famotidine  IVPB 20 milliGRAM(s) IV Intermittent two times a day  insulin lispro (HumaLOG) corrective regimen sliding scale   SubCutaneous every 6 hours  nystatin Cream 1 Application(s) Topical every 12 hours  pantoprazole  Injectable 40 milliGRAM(s) IV Push every 24 hours  Parenteral Nutrition - Adult 1 Each TPN Continuous <Continuous>  Parenteral Nutrition - Adult 1 Each TPN Continuous <Continuous>  zinc oxide 20% Ointment 1 Application(s) Topical three times a day    MEDICATIONS  (PRN):  acetaminophen   Tablet .. 650 milliGRAM(s) Oral every 6 hours PRN Temp greater or equal to 38C (100.4F), Mild Pain (1 - 3)  glucagon  Injectable 1 milliGRAM(s) IntraMuscular once PRN Glucose LESS THAN 70 milligrams/deciliter  ibuprofen  Suspension. 600 milliGRAM(s) Oral every 6 hours PRN Moderate Pain (4 - 6)  sodium chloride 0.9% lock flush 10 milliLiter(s) IV Push every 1 hour PRN Pre/post blood products, medications, blood draw, and to maintain line patency    GEN: patient appears well  LUNGS: no respiratory distress  ABD: soft, nontender  Pelvic: joseph draining clear urine; improved erythema on gluteus from previously, no pressure ulcer noted  EXT: no calf tenderness      LABS:                        8.9    5.89  )-----------( 264      ( 24 Mar 2019 11:35 )             28.6     03-24    135  |  99  |  28<H>  ----------------------------<  161<H>  4.4   |  25  |  0.55    Ca    9.5      24 Mar 2019 11:35  Phos  3.3     03-24  Mg     2.0     03-24    TPro  8.4<H>  /  Alb  3.8  /  TBili  0.4  /  DBili  x   /  AST  32  /  ALT  23  /  AlkPhos  144<H>  03-22

## 2019-03-24 NOTE — PROGRESS NOTE ADULT - ASSESSMENT
58yo F with stage IV endometrial adenocarcinoma s/p staging surgery '18 and 1 round of chemotherapy 2/19 admitted for management of symptomatic rectovaginal and enterovaginal fistulas, now with healing complex fistula associated bacteruria, hemodynamically stable and afebrile, joseph in place for overflow incontinence, re-started on anastrozole for treatment (3/16-).    1. ID: urine culture from 2/27 positive for ESBL E. coli, associated due to complex fistula, s/p bactrim DS BID on 3/1-3/8, repeat urine cx 3/6 w/ MRSA and ESBL proteus resistant to TMP-SMX; Repeat urine culture 3/21 negative -- contact precautions discontinued.   2. FEN/GI - TPN daily with daily labs; replete electrolytes PRN, octreotide daily in TPN, PICC placed 2/27, lipids every other day; Clears as tolerated for comfort   3. PAIN - overall well controlled, OPM and IV prn, occasional abdominal and knee pain  4.  - joseph placed given urinary retention 3/16; Urology consulted 3/18, recommend continuing joseph, could transition to intermittent catheterization, urodynamics as an outpatient.  - Phosphorus elevated - No phosphorus in TPN. Will follow closely.   - no current active leaking of stool  - perianal irritation consistent with possible fungal infection- switched to nystatin cream 3/23; apply to affected area    - careful monitoring urine output   - CT abdomen/pelvis w/ IV and oral contrast performed 3/15 showed improved enterovaginal fistula, no longer contrast in vagina  - continue with anastrozole for treatment   5. Endocrine- ISS, holding home metformin at this time, monitor FS; clinical picture not consistent with Diabetes Insipidus per endocrine  6. RESP - Saturating well on room air, encouraged ISS  7. VTE prophylaxis - SCDs, ambulate as tolerated with PT, Lovenox 40qd, PT    8. gyn malignancy  - continue anastrozole for treatment  - consider discharge to [facility] to increase patient's performance status   9. Derm: monitor sacrum for s/s of pressure ulcer. Encourage frequent changes in position and OOB during  the day

## 2019-03-25 ENCOUNTER — TRANSCRIPTION ENCOUNTER (OUTPATIENT)
Age: 59
End: 2019-03-25

## 2019-03-25 VITALS
TEMPERATURE: 98 F | OXYGEN SATURATION: 100 % | DIASTOLIC BLOOD PRESSURE: 73 MMHG | SYSTOLIC BLOOD PRESSURE: 106 MMHG | RESPIRATION RATE: 15 BRPM | HEART RATE: 73 BPM

## 2019-03-25 LAB
24R-OH-CALCIDIOL SERPL-MCNC: 23.1 NG/ML — LOW (ref 30–80)
GLUCOSE BLDC GLUCOMTR-MCNC: 126 MG/DL — HIGH (ref 70–99)
GLUCOSE BLDC GLUCOMTR-MCNC: 191 MG/DL — HIGH (ref 70–99)

## 2019-03-25 PROCEDURE — 83930 ASSAY OF BLOOD OSMOLALITY: CPT

## 2019-03-25 PROCEDURE — 36415 COLL VENOUS BLD VENIPUNCTURE: CPT

## 2019-03-25 PROCEDURE — 85025 COMPLETE CBC W/AUTO DIFF WBC: CPT

## 2019-03-25 PROCEDURE — 82962 GLUCOSE BLOOD TEST: CPT

## 2019-03-25 PROCEDURE — 97530 THERAPEUTIC ACTIVITIES: CPT

## 2019-03-25 PROCEDURE — A9585: CPT

## 2019-03-25 PROCEDURE — 84588 ASSAY OF VASOPRESSIN: CPT

## 2019-03-25 PROCEDURE — 84134 ASSAY OF PREALBUMIN: CPT

## 2019-03-25 PROCEDURE — 83935 ASSAY OF URINE OSMOLALITY: CPT

## 2019-03-25 PROCEDURE — 80061 LIPID PANEL: CPT

## 2019-03-25 PROCEDURE — 74177 CT ABD & PELVIS W/CONTRAST: CPT

## 2019-03-25 PROCEDURE — 82248 BILIRUBIN DIRECT: CPT

## 2019-03-25 PROCEDURE — 83036 HEMOGLOBIN GLYCOSYLATED A1C: CPT

## 2019-03-25 PROCEDURE — 80202 ASSAY OF VANCOMYCIN: CPT

## 2019-03-25 PROCEDURE — 86850 RBC ANTIBODY SCREEN: CPT

## 2019-03-25 PROCEDURE — 87086 URINE CULTURE/COLONY COUNT: CPT

## 2019-03-25 PROCEDURE — 84300 ASSAY OF URINE SODIUM: CPT

## 2019-03-25 PROCEDURE — 85384 FIBRINOGEN ACTIVITY: CPT

## 2019-03-25 PROCEDURE — 87040 BLOOD CULTURE FOR BACTERIA: CPT

## 2019-03-25 PROCEDURE — 82570 ASSAY OF URINE CREATININE: CPT

## 2019-03-25 PROCEDURE — 85730 THROMBOPLASTIN TIME PARTIAL: CPT

## 2019-03-25 PROCEDURE — 82306 VITAMIN D 25 HYDROXY: CPT

## 2019-03-25 PROCEDURE — 86901 BLOOD TYPING SEROLOGIC RH(D): CPT

## 2019-03-25 PROCEDURE — 86900 BLOOD TYPING SEROLOGIC ABO: CPT

## 2019-03-25 PROCEDURE — 87186 SC STD MICRODIL/AGAR DIL: CPT

## 2019-03-25 PROCEDURE — 85610 PROTHROMBIN TIME: CPT

## 2019-03-25 PROCEDURE — 97116 GAIT TRAINING THERAPY: CPT

## 2019-03-25 PROCEDURE — 72196 MRI PELVIS W/DYE: CPT

## 2019-03-25 PROCEDURE — 80048 BASIC METABOLIC PNL TOTAL CA: CPT

## 2019-03-25 PROCEDURE — 36573 INSJ PICC RS&I 5 YR+: CPT

## 2019-03-25 PROCEDURE — 97164 PT RE-EVAL EST PLAN CARE: CPT

## 2019-03-25 PROCEDURE — 99238 HOSP IP/OBS DSCHRG MGMT 30/<: CPT

## 2019-03-25 PROCEDURE — 84100 ASSAY OF PHOSPHORUS: CPT

## 2019-03-25 PROCEDURE — 97110 THERAPEUTIC EXERCISES: CPT

## 2019-03-25 PROCEDURE — 81001 URINALYSIS AUTO W/SCOPE: CPT

## 2019-03-25 PROCEDURE — 85027 COMPLETE CBC AUTOMATED: CPT

## 2019-03-25 PROCEDURE — 80053 COMPREHEN METABOLIC PANEL: CPT

## 2019-03-25 PROCEDURE — 82040 ASSAY OF SERUM ALBUMIN: CPT

## 2019-03-25 PROCEDURE — 86803 HEPATITIS C AB TEST: CPT

## 2019-03-25 PROCEDURE — 83735 ASSAY OF MAGNESIUM: CPT

## 2019-03-25 PROCEDURE — 84478 ASSAY OF TRIGLYCERIDES: CPT

## 2019-03-25 RX ORDER — ANASTROZOLE 1 MG/1
1 TABLET ORAL
Qty: 0 | Refills: 0 | COMMUNITY
Start: 2019-03-25

## 2019-03-25 RX ORDER — NYSTATIN CREAM 100000 [USP'U]/G
1 CREAM TOPICAL
Qty: 0 | Refills: 0 | COMMUNITY
Start: 2019-03-25

## 2019-03-25 RX ORDER — ZINC OXIDE 200 MG/G
1 OINTMENT TOPICAL
Qty: 0 | Refills: 0 | COMMUNITY
Start: 2019-03-25

## 2019-03-25 RX ADMIN — ENOXAPARIN SODIUM 40 MILLIGRAM(S): 100 INJECTION SUBCUTANEOUS at 11:18

## 2019-03-25 RX ADMIN — FAMOTIDINE 104 MILLIGRAM(S): 10 INJECTION INTRAVENOUS at 06:16

## 2019-03-25 RX ADMIN — Medication 2: at 04:25

## 2019-03-25 RX ADMIN — ANASTROZOLE 1 MILLIGRAM(S): 1 TABLET ORAL at 11:18

## 2019-03-25 RX ADMIN — NYSTATIN CREAM 1 APPLICATION(S): 100000 CREAM TOPICAL at 06:16

## 2019-03-25 RX ADMIN — ZINC OXIDE 1 APPLICATION(S): 200 OINTMENT TOPICAL at 06:15

## 2019-03-25 RX ADMIN — CHLORHEXIDINE GLUCONATE 1 APPLICATION(S): 213 SOLUTION TOPICAL at 06:15

## 2019-03-25 RX ADMIN — PANTOPRAZOLE SODIUM 40 MILLIGRAM(S): 20 TABLET, DELAYED RELEASE ORAL at 06:17

## 2019-03-25 NOTE — DISCHARGE NOTE PROVIDER - CARE PROVIDER_API CALL
Alejandra Spivey (DO)  Gynecologic Oncology; Obstetrics and Gynecology  [address] [place] [address]  Phone: [phone]  Fax: [phone]  Follow Up Time:

## 2019-03-25 NOTE — PROGRESS NOTE ADULT - ASSESSMENT
58yo F with stage IV endometrial adenocarcinoma s/p staging surgery '18 and 1 round of chemotherapy 2/19 admitted for management of symptomatic rectovaginal and enterovaginal fistulas, which has since improved significantly, hemodynamically stable and afebrile, joseph in place for overflow incontinence, re-started on anastrozole for treatment (3/16-).    1. ID: s/p treatment for for ESBL E. coli, MRSA and ESBL proteus associated due to complex fistula, most recent urine culture 3/21 negative  2. FEN/GI - TPN daily with daily labs; replete electrolytes PRN, octreotide daily in TPN, PICC placed 2/27, lipids every other day; clears as tolerated for comfort   3. PAIN - overall well controlled, OPM and IV prn, occasional abdominal and knee pain  4.  - joseph placed given urinary retention 3/16; Urology consulted 3/18, recommend continuing joseph, could transition to intermittent catheterization, urodynamics as an outpatient.  - no current active leaking of stool  - perianal irritation consistent with possible fungal infection- switched to nystatin cream 3/23; apply to affected area    - CT abdomen/pelvis w/ IV and oral contrast performed 3/15 showed improved enterovaginal fistula, no longer contrast in vagina  5. Endocrine- ISS, holding home metformin at this time, monitor FS; endocrine consulted for workup of possible DI which was negative per team   6. RESP - Saturating well on room air, encouraged ISS  7. VTE prophylaxis - SCDs, ambulate as tolerated with PT, Lovenox 40qd, PT    8. gyn malignancy  - continue anastrozole for treatment  - consider discharge to [facility] to increase patient's performance status   9. Derm: monitor sacrum for s/s of pressure ulcer. encourage frequent changes in position and OOB during  the day

## 2019-03-25 NOTE — DISCHARGE NOTE PROVIDER - HOSPITAL COURSE
60yo w/hx of endometroid adenocarnicoma transferred from [facility] to [facility] on 2/22/19 for complex fistula likely enterovaginal/rectovaginal fistula based on imaging. General surgery and urology consulted on admission and decision was made to treat fistula without surgical intervention. Pt was kept NPO and was given TPN and octreotide daily as conservative approach to treat the fistula whilst providing Pt nutrition. On admission, Pt also found to have a perianal fungal infection most likely caused by stool leakage from vagina. Antifungal was applied at the region daily. 58yo w/hx of endometroid adenocarnicoma transferred from [facility] to [facility] on 2/22/19 for complex fistula likely enterovaginal/rectovaginal fistula based on imaging. General surgery was consulted on admission and decision was made to treat fistula without surgical intervention. Pt was kept NPO and was given TPN and octreotide daily as conservative approach to treat the fistula whilst providing Pt nutrition. Nutrition team followed Pt daily and made recommendations for TPN and all adjustments necessary. On admission, Pt also found to have a perianal fungal infection most likely caused by stool leakage from vagina. Antifungal was applied at the region daily. Stool output from vagina significantly decreased during hospital stay. Also, urology was consulted for overflow incontinence so joseph catheter was placed. Urine output was noticed to be increasingly high so endocrinologist was consulted to rule out diabetes insipidus, which Pt was found not to have after DI workup. Urine culture was positive for ESBL e-coli and MRSA so Pt was put in isolation and received antibiotics.  Urine culture was repeated 72hrs after antibiotics was stopped per infectious disease recommendations and that came back as negative so isolation was not necessary anymore. Pt is hemodynamically stable at time of discharge and is going to subacute rehab. She will continue TPN, Anastrazole daily, and anticoagulation treatment there. 58yo w/hx of endometroid adenocarnicoma transferred from [facility] to [facility] on 2/22/19 for complex fistula likely enterovaginal/rectovaginal fistula based on imaging. General surgery was consulted on admission and decision was made to treat fistula without surgical intervention. Pt was kept NPO and was given TPN and octreotide daily as conservative approach to treat the fistula whilst providing Pt nutrition. Nutrition team followed Pt daily and made recommendations for TPN and all adjustments necessary. On admission, Pt also found to have a perianal fungal infection most likely caused by stool leakage from vagina. Antifungal was applied at the region daily. Stool output from vagina significantly decreased during hospital stay. Also, urology was consulted for overflow incontinence so joseph catheter was placed. Urine output was noticed to be increasingly high so endocrinologist was consulted to rule out diabetes insipidus, which Pt was found not to have after DI workup. Urine culture was positive for ESBL e-coli and MRSA so Pt was put in isolation and received antibiotics.  Urine culture was repeated 72hrs after antibiotics was stopped per infectious disease recommendations and that came back as negative so isolation was not necessary anymore. Pt is hemodynamically stable at time of discharge and is going to [place] subacute rehab. She will continue TPN, Anastrazole daily, and anticoagulation treatment there.

## 2019-03-25 NOTE — DISCHARGE NOTE PROVIDER - NSDCFUADDAPPT_GEN_ALL_CORE_FT
Please follow up in Dr. Spivey's office in 1 week. Pt will be assessed and joseph catheter will be assessed as well at this time.

## 2019-03-25 NOTE — DISCHARGE NOTE PROVIDER - NSDCCPCAREPLAN_GEN_ALL_CORE_FT
PRINCIPAL DISCHARGE DIAGNOSIS  Diagnosis: Enterovaginal fistula  Assessment and Plan of Treatment: Pt to will continue TPN with ocreotide daily in [facility]. Octreotide 50mcg daily in TPN.

## 2019-03-25 NOTE — DISCHARGE NOTE PROVIDER - NSDCFUADDINST_GEN_ALL_CORE_FT
TPN with octreotide 50mcg is to be given to Pt daily.   Anastrazole 1mg orally daily    Continue anticoagulation with Lovenox 40mg daily- Ambulation is encouraged   Continue Nystatin cream and Zinc oxide to affected area  Protonix daily Pelvic rest (nothing in vagina).  TPN with octreotide 50mcg is to be given to Pt daily.   Anastrazole 1mg orally daily    Continue anticoagulation with Lovenox 40mg daily- Ambulation is encouraged   Continue Nystatin cream and Zinc oxide to affected area  Protonix daily  Pt to got to [facility] with joseph catheter and continue- follow up with Dr. Spivey in 1 week for assessment

## 2019-03-25 NOTE — PROGRESS NOTE ADULT - SUBJECTIVE AND OBJECTIVE BOX
Pt evaluated at bedside, reports feeling well and wants to eat.  She denies fever/chills, HA, dizzines, SOB, CP, palpitations, n/v, abdominal pain, vaginal bleeding, vaginal irritation or itching. She states she walked yesterday.    T(C): 36.2 (03-25-19 @ 04:00), Max: 36.2 (03-25-19 @ 04:00)  HR: 88 (03-25-19 @ 04:00) (60 - 88)  BP: 91/64 (03-25-19 @ 04:00) (91/64 - 123/77)  RR: 17 (03-25-19 @ 04:00) (17 - 17)  SpO2: 98% (03-25-19 @ 04:00) (97% - 98%)  GA: NAD, A+OX3  CV: RRR, no MRG  Pulm: CTAB  Abd: +BS, soft, NTND, no rebound or guarding, ostomy w/ green stool  : no stool noted on chux, mild erythema surrounding perineum and anus, topical nystatin cream applied  mild sacral erythema w/ no break in skin or ulcer visible  Extrem: no calf tenderness    03-24 @ 07:01  -  03-25 @ 06:31  --------------------------------------------------------  IN: 2660 mL / OUT: 2500 mL / NET: 160 mL                        8.9    5.89  )-----------( 264      ( 24 Mar 2019 11:35 )             28.6     03-24    135  |  99  |  28<H>  ----------------------------<  161<H>  4.4   |  25  |  0.55    Ca    9.5      24 Mar 2019 11:35  Phos  3.3     03-24  Mg     2.0     03-24    MEDICATIONS  (STANDING):  anastrozole 1 milliGRAM(s) Oral daily  chlorhexidine 2% Cloths 1 Application(s) Topical <User Schedule>  dextrose 50% Injectable 25 Gram(s) IV Push once  enoxaparin Injectable 40 milliGRAM(s) SubCutaneous daily  famotidine  IVPB 20 milliGRAM(s) IV Intermittent two times a day  insulin lispro (HumaLOG) corrective regimen sliding scale   SubCutaneous every 6 hours  nystatin Cream 1 Application(s) Topical every 12 hours  pantoprazole  Injectable 40 milliGRAM(s) IV Push every 24 hours  Parenteral Nutrition - Adult 1 Each TPN Continuous <Continuous>  zinc oxide 20% Ointment 1 Application(s) Topical three times a day    MEDICATIONS  (PRN):  acetaminophen   Tablet .. 650 milliGRAM(s) Oral every 6 hours PRN Temp greater or equal to 38C (100.4F), Mild Pain (1 - 3)  glucagon  Injectable 1 milliGRAM(s) IntraMuscular once PRN Glucose LESS THAN 70 milligrams/deciliter  ibuprofen  Suspension. 600 milliGRAM(s) Oral every 6 hours PRN Moderate Pain (4 - 6)  sodium chloride 0.9% lock flush 10 milliLiter(s) IV Push every 1 hour PRN Pre/post blood products, medications, blood draw, and to maintain line patency

## 2019-03-26 ENCOUNTER — CHART COPY (OUTPATIENT)
Age: 59
End: 2019-03-26

## 2019-03-28 DIAGNOSIS — Z92.3 PERSONAL HISTORY OF IRRADIATION: ICD-10-CM

## 2019-03-28 DIAGNOSIS — Z93.3 COLOSTOMY STATUS: ICD-10-CM

## 2019-03-28 DIAGNOSIS — Z85.3 PERSONAL HISTORY OF MALIGNANT NEOPLASM OF BREAST: ICD-10-CM

## 2019-03-28 DIAGNOSIS — E11.9 TYPE 2 DIABETES MELLITUS WITHOUT COMPLICATIONS: ICD-10-CM

## 2019-03-28 DIAGNOSIS — Z86.12 PERSONAL HISTORY OF POLIOMYELITIS: ICD-10-CM

## 2019-03-28 DIAGNOSIS — R82.71 BACTERIURIA: ICD-10-CM

## 2019-03-28 DIAGNOSIS — N30.90 CYSTITIS, UNSPECIFIED WITHOUT HEMATURIA: ICD-10-CM

## 2019-03-28 DIAGNOSIS — B37.0 CANDIDAL STOMATITIS: ICD-10-CM

## 2019-03-28 DIAGNOSIS — N82.3 FISTULA OF VAGINA TO LARGE INTESTINE: ICD-10-CM

## 2019-03-28 DIAGNOSIS — C54.1 MALIGNANT NEOPLASM OF ENDOMETRIUM: ICD-10-CM

## 2019-03-28 DIAGNOSIS — B37.3 CANDIDIASIS OF VULVA AND VAGINA: ICD-10-CM

## 2019-03-28 DIAGNOSIS — N39.490 OVERFLOW INCONTINENCE: ICD-10-CM

## 2019-03-28 DIAGNOSIS — B96.29 OTHER ESCHERICHIA COLI [E. COLI] AS THE CAUSE OF DISEASES CLASSIFIED ELSEWHERE: ICD-10-CM

## 2019-03-28 DIAGNOSIS — Z79.84 LONG TERM (CURRENT) USE OF ORAL HYPOGLYCEMIC DRUGS: ICD-10-CM

## 2019-03-28 DIAGNOSIS — N31.8 OTHER NEUROMUSCULAR DYSFUNCTION OF BLADDER: ICD-10-CM

## 2019-03-28 DIAGNOSIS — Z92.21 PERSONAL HISTORY OF ANTINEOPLASTIC CHEMOTHERAPY: ICD-10-CM

## 2019-03-29 LAB
VASOPRESSIN SERPL-MCNC: 2 PG/ML — SIGNIFICANT CHANGE UP
VASOPRESSIN SERPL-MCNC: 2.8 PG/ML — SIGNIFICANT CHANGE UP
VASOPRESSIN SERPL-MCNC: 3.9 PG/ML — SIGNIFICANT CHANGE UP

## 2019-03-31 VITALS
DIASTOLIC BLOOD PRESSURE: 56 MMHG | RESPIRATION RATE: 16 BRPM | HEART RATE: 108 BPM | TEMPERATURE: 102 F | OXYGEN SATURATION: 96 % | SYSTOLIC BLOOD PRESSURE: 101 MMHG

## 2019-03-31 RX ORDER — VANCOMYCIN HCL 1 G
1000 VIAL (EA) INTRAVENOUS ONCE
Qty: 0 | Refills: 0 | Status: COMPLETED | OUTPATIENT
Start: 2019-03-31 | End: 2019-04-01

## 2019-03-31 RX ORDER — SODIUM CHLORIDE 9 MG/ML
1000 INJECTION INTRAMUSCULAR; INTRAVENOUS; SUBCUTANEOUS ONCE
Qty: 0 | Refills: 0 | Status: COMPLETED | OUTPATIENT
Start: 2019-03-31 | End: 2019-03-31

## 2019-03-31 RX ORDER — PIPERACILLIN AND TAZOBACTAM 4; .5 G/20ML; G/20ML
3.38 INJECTION, POWDER, LYOPHILIZED, FOR SOLUTION INTRAVENOUS ONCE
Qty: 0 | Refills: 0 | Status: COMPLETED | OUTPATIENT
Start: 2019-03-31 | End: 2019-04-01

## 2019-03-31 NOTE — ED ADULT TRIAGE NOTE - PRO INTERPRETER NEED 2
[id number] [employer]. [address] [place] [id number]. All rights reserved. This information is not intended as a substitute for professional medical care. Always follow your healthcare professional's instructions.         English

## 2019-03-31 NOTE — ED PROVIDER NOTE - OBJECTIVE STATEMENT
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 59F with PMHX of stage IV endometroid adenocarnicoma (s/p staging surgery '18 and 1 round of chemotherapy) recent admit for management of symptomatic rectovaginal and enterovaginal fistulas, joseph in place for overflow incontinence, with perianal fungal infection on anastrazole, recently DC'd to [place] [facility] re[facility] on TPN, p/w fever and possible PNA. Pt c/o feeling warm, no cough, no n/v, c/o pain all over her body, no new abd pain, she does c/o "acid reflux." She states she is thirsty and wants to eat but she is not allowed too.

## 2019-03-31 NOTE — ED PROVIDER NOTE - PHYSICAL EXAMINATION
GEN: chronically ill appearing, awake, alert, oriented to person, place, time/situation, febrile but nontoxic appearing.  ENT: Airway patent, Nasal mucosa clear. Mouth with dry mucosa.  EYES: Clear bilaterally.  RESPIRATORY: Breathing comfortably with normal RR. no w/c/r. Satting 96% on RA.  CARDIAC: Regular rate and rhythm  ABDOMEN: Soft, nontender, +bowel sounds, no rebound, rigidity, or guarding. Obese, no blood in ostomy, joseph draining clear yellow urine.  : deferred to gyn  MSK: limited mobility, muscle atrophy noted. Distal pulses present.  NEURO: Alert and oriented, no focal deficits.  SKIN: Skin normal color for race, warm, dry and intact. No evidence of rash.  PSYCH: Alert and oriented to person, place, time/situation. depressed mood and affect. no apparent risk to self or others.

## 2019-03-31 NOTE — ED ADULT TRIAGE NOTE - CHIEF COMPLAINT QUOTE
Sent from NH for fever.  Fever intermittently since 5PM, as per EMS xray done which showed pneumonia

## 2019-03-31 NOTE — ED PROVIDER NOTE - CLINICAL SUMMARY MEDICAL DECISION MAKING FREE TEXT BOX
Pt with above complex PMHx who p/w sepsis from subacute rehab, pt is A&O x 3, febrile but nontoxic appearing, borderline hypotensive and tachy, septic workup initiated including, 2L IVFs and borad spectrum abx, intial lactate 2.3, no obvious infiltrate on CXR. Gyn consulted for admission Pt with above complex PMHx who p/w sepsis from subacute rehab, pt is A&O x 3, febrile but nontoxic appearing, borderline hypotensive and tachy, septic workup initiated including, 2L IVFs and broad spectrum abx, initial lactate 2.3, no obvious infiltrate on CXR. Gyn consulted for admission

## 2019-04-01 ENCOUNTER — INPATIENT (INPATIENT)
Facility: HOSPITAL | Age: 59
LOS: 15 days | Discharge: NURSING, REHAB OR LONG-TERM CARE | DRG: 314 | End: 2019-04-17
Attending: OBSTETRICS & GYNECOLOGY | Admitting: OBSTETRICS & GYNECOLOGY
Payer: MEDICARE

## 2019-04-01 LAB
ALBUMIN SERPL ELPH-MCNC: 3.2 G/DL — LOW (ref 3.3–5)
ALBUMIN SERPL ELPH-MCNC: 3.6 G/DL — SIGNIFICANT CHANGE UP (ref 3.3–5)
ALP SERPL-CCNC: 129 U/L — HIGH (ref 40–120)
ALP SERPL-CCNC: 211 U/L — HIGH (ref 40–120)
ALT FLD-CCNC: 48 U/L — HIGH (ref 10–45)
ALT FLD-CCNC: 489 U/L — HIGH (ref 10–45)
ANION GAP SERPL CALC-SCNC: 12 MMOL/L — SIGNIFICANT CHANGE UP (ref 5–17)
ANION GAP SERPL CALC-SCNC: 13 MMOL/L — SIGNIFICANT CHANGE UP (ref 5–17)
APPEARANCE UR: CLEAR — SIGNIFICANT CHANGE UP
APTT BLD: 32.3 SEC — SIGNIFICANT CHANGE UP (ref 27.5–36.3)
AST SERPL-CCNC: 73 U/L — HIGH (ref 10–40)
AST SERPL-CCNC: 850 U/L — HIGH (ref 10–40)
BASE EXCESS BLDV CALC-SCNC: -2.9 MMOL/L — SIGNIFICANT CHANGE UP
BASOPHILS # BLD AUTO: 0 K/UL — SIGNIFICANT CHANGE UP (ref 0–0.2)
BASOPHILS NFR BLD AUTO: 0 % — SIGNIFICANT CHANGE UP (ref 0–2)
BILIRUB SERPL-MCNC: 0.3 MG/DL — SIGNIFICANT CHANGE UP (ref 0.2–1.2)
BILIRUB SERPL-MCNC: 0.7 MG/DL — SIGNIFICANT CHANGE UP (ref 0.2–1.2)
BILIRUB UR-MCNC: NEGATIVE — SIGNIFICANT CHANGE UP
BUN SERPL-MCNC: 16 MG/DL — SIGNIFICANT CHANGE UP (ref 7–23)
BUN SERPL-MCNC: 22 MG/DL — SIGNIFICANT CHANGE UP (ref 7–23)
CALCIUM SERPL-MCNC: 8.1 MG/DL — LOW (ref 8.4–10.5)
CALCIUM SERPL-MCNC: 9.2 MG/DL — SIGNIFICANT CHANGE UP (ref 8.4–10.5)
CHLORIDE SERPL-SCNC: 102 MMOL/L — SIGNIFICANT CHANGE UP (ref 96–108)
CHLORIDE SERPL-SCNC: 112 MMOL/L — HIGH (ref 96–108)
CO2 SERPL-SCNC: 17 MMOL/L — LOW (ref 22–31)
CO2 SERPL-SCNC: 21 MMOL/L — LOW (ref 22–31)
COLOR SPEC: YELLOW — SIGNIFICANT CHANGE UP
CREAT SERPL-MCNC: 0.53 MG/DL — SIGNIFICANT CHANGE UP (ref 0.5–1.3)
CREAT SERPL-MCNC: 0.59 MG/DL — SIGNIFICANT CHANGE UP (ref 0.5–1.3)
DIFF PNL FLD: NEGATIVE — SIGNIFICANT CHANGE UP
EOSINOPHIL # BLD AUTO: 0 K/UL — SIGNIFICANT CHANGE UP (ref 0–0.5)
EOSINOPHIL NFR BLD AUTO: 0 % — SIGNIFICANT CHANGE UP (ref 0–6)
GAS PNL BLDV: SIGNIFICANT CHANGE UP
GLUCOSE BLDC GLUCOMTR-MCNC: 149 MG/DL — HIGH (ref 70–99)
GLUCOSE SERPL-MCNC: 133 MG/DL — HIGH (ref 70–99)
GLUCOSE SERPL-MCNC: 162 MG/DL — HIGH (ref 70–99)
GLUCOSE UR QL: NEGATIVE — SIGNIFICANT CHANGE UP
HCO3 BLDV-SCNC: 23 MMOL/L — SIGNIFICANT CHANGE UP (ref 20–27)
HCT VFR BLD CALC: 27.2 % — LOW (ref 34.5–45)
HCT VFR BLD CALC: 30.4 % — LOW (ref 34.5–45)
HGB BLD-MCNC: 8.8 G/DL — LOW (ref 11.5–15.5)
HGB BLD-MCNC: 8.9 G/DL — LOW (ref 11.5–15.5)
INR BLD: 1.13 — SIGNIFICANT CHANGE UP (ref 0.88–1.16)
KETONES UR-MCNC: NEGATIVE — SIGNIFICANT CHANGE UP
LACTATE SERPL-SCNC: 1.7 MMOL/L — SIGNIFICANT CHANGE UP (ref 0.5–2)
LACTATE SERPL-SCNC: 2.3 MMOL/L — HIGH (ref 0.5–2)
LEUKOCYTE ESTERASE UR-ACNC: ABNORMAL
LYMPHOCYTES # BLD AUTO: 0.5 K/UL — LOW (ref 1–3.3)
LYMPHOCYTES # BLD AUTO: 5.2 % — LOW (ref 13–44)
MAGNESIUM SERPL-MCNC: 1.6 MG/DL — SIGNIFICANT CHANGE UP (ref 1.6–2.6)
MCHC RBC-ENTMCNC: 28.9 GM/DL — LOW (ref 32–36)
MCHC RBC-ENTMCNC: 30 PG — SIGNIFICANT CHANGE UP (ref 27–34)
MCHC RBC-ENTMCNC: 31.6 PG — SIGNIFICANT CHANGE UP (ref 27–34)
MCHC RBC-ENTMCNC: 32.7 GM/DL — SIGNIFICANT CHANGE UP (ref 32–36)
MCV RBC AUTO: 103.8 FL — HIGH (ref 80–100)
MCV RBC AUTO: 96.5 FL — SIGNIFICANT CHANGE UP (ref 80–100)
MONOCYTES # BLD AUTO: 0.25 K/UL — SIGNIFICANT CHANGE UP (ref 0–0.9)
MONOCYTES NFR BLD AUTO: 2.6 % — SIGNIFICANT CHANGE UP (ref 2–14)
NEUTROPHILS # BLD AUTO: 8.87 K/UL — HIGH (ref 1.8–7.4)
NEUTROPHILS NFR BLD AUTO: 87.8 % — HIGH (ref 43–77)
NITRITE UR-MCNC: POSITIVE
NRBC # BLD: 0 /100 WBCS — SIGNIFICANT CHANGE UP (ref 0–0)
PCO2 BLDV: 42 MMHG — SIGNIFICANT CHANGE UP (ref 41–51)
PH BLDV: 7.35 — SIGNIFICANT CHANGE UP (ref 7.32–7.43)
PH UR: 6.5 — SIGNIFICANT CHANGE UP (ref 5–8)
PHOSPHATE SERPL-MCNC: 3.8 MG/DL — SIGNIFICANT CHANGE UP (ref 2.5–4.5)
PLATELET # BLD AUTO: 238 K/UL — SIGNIFICANT CHANGE UP (ref 150–400)
PLATELET # BLD AUTO: 277 K/UL — SIGNIFICANT CHANGE UP (ref 150–400)
PO2 BLDV: 42 MMHG — SIGNIFICANT CHANGE UP
POTASSIUM SERPL-MCNC: 4.5 MMOL/L — SIGNIFICANT CHANGE UP (ref 3.5–5.3)
POTASSIUM SERPL-MCNC: 4.6 MMOL/L — SIGNIFICANT CHANGE UP (ref 3.5–5.3)
POTASSIUM SERPL-SCNC: 4.5 MMOL/L — SIGNIFICANT CHANGE UP (ref 3.5–5.3)
POTASSIUM SERPL-SCNC: 4.6 MMOL/L — SIGNIFICANT CHANGE UP (ref 3.5–5.3)
PROT SERPL-MCNC: 6.9 G/DL — SIGNIFICANT CHANGE UP (ref 6–8.3)
PROT SERPL-MCNC: 7 G/DL — SIGNIFICANT CHANGE UP (ref 6–8.3)
PROT UR-MCNC: ABNORMAL MG/DL
PROTHROM AB SERPL-ACNC: 12.8 SEC — SIGNIFICANT CHANGE UP (ref 10–12.9)
RAPID RVP RESULT: SIGNIFICANT CHANGE UP
RBC # BLD: 2.82 M/UL — LOW (ref 3.8–5.2)
RBC # BLD: 2.93 M/UL — LOW (ref 3.8–5.2)
RBC # FLD: 15.6 % — HIGH (ref 10.3–14.5)
RBC # FLD: 15.9 % — HIGH (ref 10.3–14.5)
SAO2 % BLDV: 68 % — SIGNIFICANT CHANGE UP
SODIUM SERPL-SCNC: 136 MMOL/L — SIGNIFICANT CHANGE UP (ref 135–145)
SODIUM SERPL-SCNC: 141 MMOL/L — SIGNIFICANT CHANGE UP (ref 135–145)
SP GR SPEC: 1.01 — SIGNIFICANT CHANGE UP (ref 1–1.03)
UROBILINOGEN FLD QL: 0.2 E.U./DL — SIGNIFICANT CHANGE UP
WBC # BLD: 7.43 K/UL — SIGNIFICANT CHANGE UP (ref 3.8–10.5)
WBC # BLD: 9.62 K/UL — SIGNIFICANT CHANGE UP (ref 3.8–10.5)
WBC # FLD AUTO: 7.43 K/UL — SIGNIFICANT CHANGE UP (ref 3.8–10.5)
WBC # FLD AUTO: 9.62 K/UL — SIGNIFICANT CHANGE UP (ref 3.8–10.5)

## 2019-04-01 PROCEDURE — 99223 1ST HOSP IP/OBS HIGH 75: CPT

## 2019-04-01 PROCEDURE — 71045 X-RAY EXAM CHEST 1 VIEW: CPT | Mod: 26

## 2019-04-01 PROCEDURE — 99291 CRITICAL CARE FIRST HOUR: CPT

## 2019-04-01 PROCEDURE — 93010 ELECTROCARDIOGRAM REPORT: CPT

## 2019-04-01 PROCEDURE — 72149 MRI LUMBAR SPINE W/DYE: CPT | Mod: 26

## 2019-04-01 PROCEDURE — 71045 X-RAY EXAM CHEST 1 VIEW: CPT | Mod: 26,77

## 2019-04-01 RX ORDER — IBUPROFEN 200 MG
600 TABLET ORAL EVERY 6 HOURS
Qty: 0 | Refills: 0 | Status: DISCONTINUED | OUTPATIENT
Start: 2019-04-01 | End: 2019-04-02

## 2019-04-01 RX ORDER — PANTOPRAZOLE SODIUM 20 MG/1
40 TABLET, DELAYED RELEASE ORAL DAILY
Qty: 0 | Refills: 0 | Status: DISCONTINUED | OUTPATIENT
Start: 2019-04-01 | End: 2019-04-09

## 2019-04-01 RX ORDER — ACETAMINOPHEN 500 MG
650 TABLET ORAL EVERY 6 HOURS
Qty: 0 | Refills: 0 | Status: DISCONTINUED | OUTPATIENT
Start: 2019-04-01 | End: 2019-04-01

## 2019-04-01 RX ORDER — PIPERACILLIN AND TAZOBACTAM 4; .5 G/20ML; G/20ML
3.38 INJECTION, POWDER, LYOPHILIZED, FOR SOLUTION INTRAVENOUS EVERY 6 HOURS
Qty: 0 | Refills: 0 | Status: DISCONTINUED | OUTPATIENT
Start: 2019-04-01 | End: 2019-04-03

## 2019-04-01 RX ORDER — SODIUM CHLORIDE 9 MG/ML
1000 INJECTION INTRAMUSCULAR; INTRAVENOUS; SUBCUTANEOUS ONCE
Qty: 0 | Refills: 0 | Status: COMPLETED | OUTPATIENT
Start: 2019-04-01 | End: 2019-04-01

## 2019-04-01 RX ORDER — FAMOTIDINE 10 MG/ML
20 INJECTION INTRAVENOUS
Qty: 0 | Refills: 0 | Status: DISCONTINUED | OUTPATIENT
Start: 2019-04-01 | End: 2019-04-09

## 2019-04-01 RX ORDER — NYSTATIN/TRIAMCINOLONE ACET
1 OINTMENT (GRAM) TOPICAL EVERY 12 HOURS
Qty: 0 | Refills: 0 | Status: DISCONTINUED | OUTPATIENT
Start: 2019-04-01 | End: 2019-04-10

## 2019-04-01 RX ORDER — SODIUM CHLORIDE 9 MG/ML
1000 INJECTION, SOLUTION INTRAVENOUS
Qty: 0 | Refills: 0 | Status: DISCONTINUED | OUTPATIENT
Start: 2019-04-01 | End: 2019-04-02

## 2019-04-01 RX ORDER — ACETAMINOPHEN 500 MG
975 TABLET ORAL ONCE
Qty: 0 | Refills: 0 | Status: COMPLETED | OUTPATIENT
Start: 2019-04-01 | End: 2019-04-01

## 2019-04-01 RX ORDER — ENOXAPARIN SODIUM 100 MG/ML
40 INJECTION SUBCUTANEOUS DAILY
Qty: 0 | Refills: 0 | Status: DISCONTINUED | OUTPATIENT
Start: 2019-04-01 | End: 2019-04-10

## 2019-04-01 RX ORDER — ANASTROZOLE 1 MG/1
1 TABLET ORAL DAILY
Qty: 0 | Refills: 0 | Status: DISCONTINUED | OUTPATIENT
Start: 2019-04-01 | End: 2019-04-09

## 2019-04-01 RX ORDER — SODIUM CHLORIDE 9 MG/ML
1000 INJECTION, SOLUTION INTRAVENOUS
Qty: 0 | Refills: 0 | Status: DISCONTINUED | OUTPATIENT
Start: 2019-04-01 | End: 2019-04-01

## 2019-04-01 RX ORDER — ACETAMINOPHEN 500 MG
1000 TABLET ORAL ONCE
Qty: 0 | Refills: 0 | Status: COMPLETED | OUTPATIENT
Start: 2019-04-01 | End: 2019-04-01

## 2019-04-01 RX ADMIN — Medication 250 MILLIGRAM(S): at 00:51

## 2019-04-01 RX ADMIN — SODIUM CHLORIDE 2000 MILLILITER(S): 9 INJECTION INTRAMUSCULAR; INTRAVENOUS; SUBCUTANEOUS at 03:20

## 2019-04-01 RX ADMIN — Medication 400 MILLIGRAM(S): at 09:05

## 2019-04-01 RX ADMIN — SODIUM CHLORIDE 90 MILLILITER(S): 9 INJECTION, SOLUTION INTRAVENOUS at 03:59

## 2019-04-01 RX ADMIN — Medication 600 MILLIGRAM(S): at 13:48

## 2019-04-01 RX ADMIN — PIPERACILLIN AND TAZOBACTAM 200 GRAM(S): 4; .5 INJECTION, POWDER, LYOPHILIZED, FOR SOLUTION INTRAVENOUS at 10:30

## 2019-04-01 RX ADMIN — FAMOTIDINE 20 MILLIGRAM(S): 10 INJECTION INTRAVENOUS at 07:14

## 2019-04-01 RX ADMIN — ENOXAPARIN SODIUM 40 MILLIGRAM(S): 100 INJECTION SUBCUTANEOUS at 12:49

## 2019-04-01 RX ADMIN — FAMOTIDINE 20 MILLIGRAM(S): 10 INJECTION INTRAVENOUS at 17:13

## 2019-04-01 RX ADMIN — Medication 1 APPLICATION(S): at 09:05

## 2019-04-01 RX ADMIN — PIPERACILLIN AND TAZOBACTAM 200 GRAM(S): 4; .5 INJECTION, POWDER, LYOPHILIZED, FOR SOLUTION INTRAVENOUS at 16:00

## 2019-04-01 RX ADMIN — Medication 600 MILLIGRAM(S): at 12:48

## 2019-04-01 RX ADMIN — SODIUM CHLORIDE 90 MILLILITER(S): 9 INJECTION, SOLUTION INTRAVENOUS at 19:06

## 2019-04-01 RX ADMIN — PIPERACILLIN AND TAZOBACTAM 200 GRAM(S): 4; .5 INJECTION, POWDER, LYOPHILIZED, FOR SOLUTION INTRAVENOUS at 00:31

## 2019-04-01 RX ADMIN — Medication 1000 MILLIGRAM(S): at 09:05

## 2019-04-01 RX ADMIN — Medication 975 MILLIGRAM(S): at 00:31

## 2019-04-01 RX ADMIN — SODIUM CHLORIDE 2000 MILLILITER(S): 9 INJECTION INTRAMUSCULAR; INTRAVENOUS; SUBCUTANEOUS at 03:19

## 2019-04-01 RX ADMIN — ANASTROZOLE 1 MILLIGRAM(S): 1 TABLET ORAL at 12:48

## 2019-04-01 RX ADMIN — Medication 1 APPLICATION(S): at 23:35

## 2019-04-01 RX ADMIN — PANTOPRAZOLE SODIUM 40 MILLIGRAM(S): 20 TABLET, DELAYED RELEASE ORAL at 12:49

## 2019-04-01 RX ADMIN — SODIUM CHLORIDE 2000 MILLILITER(S): 9 INJECTION INTRAMUSCULAR; INTRAVENOUS; SUBCUTANEOUS at 00:31

## 2019-04-01 RX ADMIN — PIPERACILLIN AND TAZOBACTAM 200 GRAM(S): 4; .5 INJECTION, POWDER, LYOPHILIZED, FOR SOLUTION INTRAVENOUS at 22:00

## 2019-04-01 RX ADMIN — Medication 975 MILLIGRAM(S): at 03:20

## 2019-04-01 NOTE — PROGRESS NOTE ADULT - SUBJECTIVE AND OBJECTIVE BOX
GYN Progress Note    Patient seen at bedside this afternoon. Resting comfortable in bed. States her only discomfort is in perineal region.  Continues to have chills intermittently and feeling warm at other times.  Tolerating clear liquids. Westbrook remains in place. States that she had been ambulating and using stationary bike at rehab, today has not tried ambulating yet. Denies CP, palpitations, SOB, fever, chills, nausea, vomiting. Denies any coughing or any congestion.     Vital Signs Last 24 Hrs  T(C): 36.7 (2019 16:33), Max: 38.9 (31 Mar 2019 23:40)  T(F): 98.1 (2019 16:33), Max: 102 (31 Mar 2019 23:40)  HR: 86 (2019 16:33) (74 - 108)  BP: 94/63 (2019 11:24) (82/51 - 129/72)  BP(mean): --  RR: 16 (2019 16:33) (16 - 18)  SpO2: 100% (2019 16:33) (95% - 100%)    Physical Exam:  Gen: No Acute Distress, resting comfortable in bed  Pulm: Normal work of breathing, bilateral lower lobe crackles  GI: soft, non tender, nondistended, +BS, no rebound, no guarding, ostomy pink with no output   Ext: SCDs in place, no edema/erythema/tenderness     I&O's Summary    2019 07:01  -  2019 16:38  --------------------------------------------------------  IN: 1215 mL / OUT: 850 mL / NET: 365 mL      MEDICATIONS  (STANDING):  anastrozole 1 milliGRAM(s) Oral daily  enoxaparin Injectable 40 milliGRAM(s) SubCutaneous daily  famotidine Injectable 20 milliGRAM(s) IV Push two times a day  lactated ringers. 1000 milliLiter(s) (90 mL/Hr) IV Continuous <Continuous>  nystatin/triamcinolone Cream 1 Application(s) Topical every 12 hours  pantoprazole  Injectable 40 milliGRAM(s) IV Push daily  piperacillin/tazobactam IVPB. 3.375 Gram(s) IV Intermittent every 6 hours    MEDICATIONS  (PRN):  acetaminophen   Tablet .. 650 milliGRAM(s) Oral every 6 hours PRN Mild Pain (1 - 3)  ibuprofen  Tablet. 600 milliGRAM(s) Oral every 6 hours PRN Moderate Pain (4 - 6)      LABS:                        8.8    7.43  )-----------( 238      ( 2019 05:47 )             30.4         141  |  112<H>  |  16  ----------------------------<  133<H>  4.5   |  17<L>  |  0.53    Ca    8.1<L>      2019 05:47  Phos  3.8       Mg     1.6         TPro  6.9  /  Alb  3.2<L>  /  TBili  0.7  /  DBili  x   /  AST  850<H>  /  ALT  489<H>  /  AlkPhos  211<H>      PT/INR - ( 2019 00:36 )   PT: 12.8 sec;   INR: 1.13          PTT - ( 2019 00:36 )  PTT:32.3 sec  Urinalysis Basic - ( 2019 02:16 )    Color: Yellow / Appearance: Clear / S.015 / pH: x  Gluc: x / Ketone: NEGATIVE  / Bili: Negative / Urobili: 0.2 E.U./dL   Blood: x / Protein: Trace mg/dL / Nitrite: POSITIVE   Leuk Esterase: Small / RBC: < 5 /HPF / WBC 5-10 /HPF   Sq Epi: x / Non Sq Epi: 0-5 /HPF / Bacteria: Present /HPF

## 2019-04-01 NOTE — PROGRESS NOTE ADULT - ASSESSMENT
INTERVAL UPDATES TO PLAN:  - will order TPN today  - consider continuing IV abx  - assess rising LFTs

## 2019-04-01 NOTE — H&P ADULT - HISTORY OF PRESENT ILLNESS
significant for breast cancer, diabetes mellitus, endometrial cancer stage 3, s/p exploratory laparotomy, enterolysis, SHAMIR, BSO, pelvic lymphadenectomy, low anterior resection mobilization of splenic flexure, end colostomy on 7/30/18 with rectovaginal fistula presenting from [facility] with fever.  She reports having myalgias starting on 3/31 and was found to be febrile to 100.5 at the [facility] facility so was sent to [facility] for further evaluation.  She denies headache, cough, SOB, chest pain, abdominal pain, vomiting, leakage of stool from fistula.  Yesterday she felt nauseous and spit up saliva but no episodes of emesis.  She has been on TPN and clears, has joseph in place.      She was previously admitted 2/22-3/25 for management of rectovaginal fistula.  She was initially kept NPO and was given TPN and octreotide daily as conservative approach to treat the fistula whilst providing nutrition. Nutrition team followed Pt daily and made recommendations for TPN and all adjustments necessary. On admission, Pt also found to have a perianal fungal infection most likely caused by stool leakage from vagina. Antifungal was applied at the region daily. Stool output from vagina significantly decreased during hospital stay. Also, urology was consulted for overflow incontinence so joseph catheter was placed. Urine output was noticed to be increasingly high so endocrinologist was consulted to rule out diabetes insipidus, which Pt was found not to have after DI workup. Urine culture was positive for ESBL e-coli and MRSA so Pt was put in isolation and received antibiotics.  Urine culture was repeated 72hrs after antibiotics was stopped per infectious disease recommendations and that came back as negative so isolation was not necessary anymore. Patient was discharged to [place] [facility] re[facility], and was discharged on 3/25 on TPN, anastrazole daily and anticoagulation treatment.    OB/GYN Hx: G0, endometrial cancer dx in 7/2018; h/o breast cancer in 2009 s/p chemo and radiation with left breast lumpectomy  PMHx: T2DM, polio in childhood, h/o breast cancer  SHx: left breast lumpectomy, right knee surgery with screw placement 2001, 7/2018 exploratory laparotomy, enterolysis, SHAMIR, BSO, pelvic lymphadenectomy, low anterior resection mobilization of splenic flexure, end colostomy   Meds: metformin 24228 BID, nexium  Allergies: NKDA    PHYSICAL EXAM:   Vital Signs Last 24 Hrs  T(C): 38.9 (31 Mar 2019 23:40), Max: 38.9 (31 Mar 2019 23:40)  T(F): 102 (31 Mar 2019 23:40), Max: 102 (31 Mar 2019 23:40)  HR: 108 (31 Mar 2019 23:40) (108 - 108)  BP: 101/56 (31 Mar 2019 23:40) (101/56 - 101/56)  BP(mean): --  RR: 16 (31 Mar 2019 23:40) (16 - 16)  SpO2: 96% (31 Mar 2019 23:40) (96% - 96%)    **************************  Constitutional: Alert & Oriented x3, No acute distress  Respiratory: Clear to ausculation bilaterally; no wheezing, rhonchi, or crackles  Cardiovascular: regular rate and rhythm, no murmurs, or gallops  Gastrointestinal: soft, non tender, hypoactivebowel sounds, no rebound or guarding   Pelvic exam:   Extremities: no calf tenderness or swelling, no erythema, LE sizes equal bilaterally      LABS:                        8.9    9.62  )-----------( 277      ( 01 Apr 2019 00:36 )             27.2     04-01    136  |  102  |  22  ----------------------------<  162<H>  4.6   |  21<L>  |  0.59    Ca    9.2      01 Apr 2019 00:36    TPro  7.0  /  Alb  3.6  /  TBili  0.3  /  DBili  x   /  AST  73<H>  /  ALT  48<H>  /  AlkPhos  129<H>  04-01    PT/INR - ( 01 Apr 2019 00:36 )   PT: 12.8 sec;   INR: 1.13          PTT - ( 01 Apr 2019 00:36 )  PTT:32.3 sec        RADIOLOGY & ADDITIONAL STUDIES: significant for breast cancer, diabetes mellitus, endometrial cancer stage 3, s/p exploratory laparotomy, enterolysis, SHAMIR, BSO, pelvic lymphadenectomy, low anterior resection mobilization of splenic flexure, end colostomy on 7/30/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  She reports having myalgias starting on 3/31 and was found to be febrile to 100.5 at the [facility] facility so was sent to [facility] for further evaluation.  She denies headache, cough, SOB, chest pain, abdominal pain, vomiting, leakage of stool from fistula.  Yesterday she felt nauseous and spit up saliva but no episodes of emesis.  She has been on TPN and clears, has joseph in place.      She was previously admitted 2/22-3/25 for management of rectovaginal fistula.  She was initially kept NPO and was given TPN and octreotide daily as conservative approach to treat the fistula whilst providing nutrition. Nutrition team followed Pt daily and made recommendations for TPN and all adjustments necessary. On admission, Pt also found to have a perianal fungal infection most likely caused by stool leakage from vagina. Antifungal was applied at the region daily. Stool output from vagina significantly decreased during hospital stay. Also, urology was consulted for overflow incontinence so joseph catheter was placed. Urine output was noticed to be increasingly high so endocrinologist was consulted to rule out diabetes insipidus, which Pt was found not to have after DI workup. Urine culture was positive for ESBL e-coli and MRSA so Pt was put in isolation and received antibiotics.  Urine culture was repeated 72hrs after antibiotics was stopped per infectious disease recommendations and that came back as negative so isolation was not necessary anymore. Patient was discharged to [place] [facility] rehab, and was discharged on 3/25 on TPN, anastrazole daily and anticoagulation treatment.    OB/GYN Hx: G0, endometrial cancer dx in 7/2018; h/o breast cancer in 2009 s/p chemo and radiation with left breast lumpectomy  PMHx: T2DM, polio in childhood, h/o breast cancer  SHx: left breast lumpectomy, right knee surgery with screw placement 2001, 7/2018 exploratory laparotomy, enterolysis, SHAMIR, BSO, pelvic lymphadenectomy, low anterior resection mobilization of splenic flexure, end colostomy   Meds: metformin [id number] BID, nexium  Allergies: NKDA    PHYSICAL EXAM:   Vital Signs Last 24 Hrs  T(C): 38.9 (31 Mar 2019 23:40), Max: 38.9 (31 Mar 2019 23:40)  T(F): 102 (31 Mar 2019 23:40), Max: 102 (31 Mar 2019 23:40)  HR: 108 (31 Mar 2019 23:40) (108 - 108)  BP: 101/56 (31 Mar 2019 23:40) (101/56 - 101/56)  BP(mean): --  RR: 16 (31 Mar 2019 23:40) (16 - 16)  SpO2: 96% (31 Mar 2019 23:40) (96% - 96%)    **************************  Constitutional: Alert & Oriented x3, No acute distress  Respiratory: Clear to ausculation bilaterally; no wheezing, rhonchi, or crackles  Cardiovascular: regular rate and rhythm, no murmurs, or gallops  Gastrointestinal: soft, non tender, hypoactivebowel sounds, no rebound or guarding   No CVA tenderness bilaterally  Pelvic exam: no decubitus ulcer noted, inguinal fungal infection stable from previously with nystatin cream in place  Extremities: no calf tenderness or swelling, no erythema, LE sizes equal bilaterally      LABS:                        8.9    9.62  )-----------( 277      ( 01 Apr 2019 00:36 )             27.2     04-01    136  |  102  |  22  ----------------------------<  162<H>  4.6   |  21<L>  |  0.59    Ca    9.2      01 Apr 2019 00:36    TPro  7.0  /  Alb  3.6  /  TBili  0.3  /  DBili  x   /  AST  73<H>  /  ALT  48<H>  /  AlkPhos  129<H>  04-01    PT/INR - ( 01 Apr 2019 00:36 )   PT: 12.8 sec;   INR: 1.13          PTT - ( 01 Apr 2019 00:36 )  PTT:32.3 sec        RADIOLOGY & ADDITIONAL STUDIES:

## 2019-04-01 NOTE — ED ADULT NURSE REASSESSMENT NOTE - NS ED NURSE REASSESS COMMENT FT1
Pt BP noted to be hypotensive. OBGYN notified.
Pt care completed, turned and repositioned.  Gown and bedding changed prior to transport to [facility]. Pt in no acute distress.

## 2019-04-01 NOTE — ED ADULT NURSE NOTE - OBJECTIVE STATEMENT
PT is a 59y female BIBA from rehab center for possible PNA. PT AAOx3 upon assessment. Septic protocol followed. PT is a 59y female BIBA from rehab center w/ c/o fever & for possible PNA. Pt AAOx3 upon assessment. Septic protocol followed. PICC line noted to the right arm, Westbrook in place prior to arrival to ED, colostomy bag noted.  Hx of endometrioid CA (stage IV)

## 2019-04-01 NOTE — PROGRESS NOTE ADULT - ASSESSMENT
58yo F with stage IV endometrial adenocarcinoma s/p staging surgery '18 and 1 round of chemotherapy 2/19, previously admitted for management of symptomatic rectovaginal and enterovaginal fistulas 2/22-3/25, now presenting from [facility] with fever. Last fever at 0855am 4/1.       1. ID: s/p vancomycin and Zosyn in ED, continue with Zosyn 3.375mg q6hrs for fever ; f/u urine and blood cultures, f/u CXray (prelim read shows likely atelectasis)-possible source of infection  2. FEN/GI - TPN daily with daily labs- to be ordered in AM; replete electrolytes PRN, octreotide daily in TPN, PICC placed 2/27, lipids every other day- to be ordered tomorrow with TPN; clears as tolerated for comfort   - elevated liver enzymes, continue to trend, GI consulted appreciate recs, RUQ sono ordered   3. PAIN - tylenol and motrin prn  4.  - joseph placed given urinary retention 3/16  - no current active leaking of stool  - perianal irritation consistent with possible fungal infection- switched to nystatin cream 3/23; apply to affected area  as needed   - CT abdomen/pelvis w/ IV and oral contrast performed 3/15 showed improved enterovaginal fistula, no longer contrast in vagina  5. Endocrine- ISS, holding home metformin at this time, monitor FS; endocrine consulted for workup of possible DI which was negative per team   6. RESP - Saturating well on room air, encouraged ISS, per Radiology no concern for Pneumonia at this time   7. VTE prophylaxis - SCDs, ambulate as tolerated with PT, Lovenox 40qd, PT    8. Neuro- neurology team consulted to evaluate lower extremity weakness persistent, f/u recs  9. GYN malignancy  - continue anastrozole for treatment  - consider discharge to [facility] to increase patient's performance status   9. Derm: monitor sacrum for s/s of pressure ulcer. encourage frequent changes in position and OOB during  the day 60yo F with stage IV endometrial adenocarcinoma s/p staging surgery '18 and 1 round of chemotherapy 2/19, previously admitted for management of symptomatic rectovaginal and enterovaginal fistulas 2/22-3/25, now presenting from [facility] with fever. Last fever at 0855am 4/1.       1. ID: s/p vancomycin and Zosyn in ED, continue with Zosyn 3.375mg q6hrs for fever ; f/u urine and blood cultures, f/u CXray (prelim read shows likely atelectasis)-possible source of infection  2. FEN/GI - TPN daily with daily labs- to be ordered in AM; replete electrolytes PRN, octreotide daily in TPN, PICC placed 2/27, lipids every other day- to be ordered tomorrow with TPN; clears as tolerated for comfort   - elevated liver enzymes, continue to trend, GI consulted appreciate recs, RUQ sono ordered   3. PAIN - tylenol and motrin prn  4.  - joseph placed given urinary retention 3/16  - no current active leaking of stool  - perianal irritation consistent with possible fungal infection- switched to nystatin cream 3/23; apply to affected area  as needed   - CT abdomen/pelvis w/ IV and oral contrast performed 3/15 showed improved enterovaginal fistula, no longer contrast in vagina  5. Endocrine- ISS, holding home metformin at this time, monitor FS; endocrine consulted for workup of possible DI which was negative per team   6. RESP - Saturating well on room air, encouraged ISS, per Radiology no concern for Pneumonia at this time   7. VTE prophylaxis - SCDs, ambulate as tolerated with PT, Lovenox 40qd, PT    8. Neuro- neurology team consulted to evaluate lower extremity weakness persistent, f/u recs  9. GYN malignancy  - continue anastrozole for treatment  - consider discharge to [facility] to increase patient's performance status   9. Derm: monitor sacrum for s/s of pressure ulcer. encourage frequent changes in position and OOB during  the day      PATIENT SEEN AGAIN AT 630PM. VSS. NO COMPLAINTS. TOLERATING CLEARS. AWAITING RUQ SONO. LABS WILL BE IN AM. NEURO RECOMMENDS MRI - WILL ORDER. NO TPN TODAY BECAUSE OF LIVER ENZYMES, WILL ORDER TOMORROW.

## 2019-04-01 NOTE — H&P ADULT - ASSESSMENT
58yo F with stage IV endometrial adenocarcinoma s/p staging surgery '18 and 1 round of chemotherapy 2/19, previously admitted for management of symptomatic rectovaginal and enterovaginal fistulas 2/22-3/25, now presenting from [facility] with fever. 60yo F with stage IV endometrial adenocarcinoma s/p staging surgery '18 and 1 round of chemotherapy 2/19, previously admitted for management of symptomatic rectovaginal and enterovaginal fistulas 2/22-3/25, now presenting from [facility] with fever.      1. ID: s/p zosyn, vanc in ED; consider bactrim and vanc; f/u urine and blood cultures  2. FEN/GI - TPN daily with daily labs; replete electrolytes PRN, octreotide daily in TPN, PICC placed 2/27, lipids every other day; clears as tolerated for comfort   3. PAIN - tylenol and motrin prn  4.  - joseph placed given urinary retention 3/16  - no current active leaking of stool  - perianal irritation consistent with possible fungal infection- switched to nystatin cream 3/23; apply to affected area    - CT abdomen/pelvis w/ IV and oral contrast performed 3/15 showed improved enterovaginal fistula, no longer contrast in vagina  5. Endocrine- ISS, holding home metformin at this time, monitor FS; endocrine consulted for workup of possible DI which was negative per team   6. RESP - Saturating well on room air, encouraged ISS  7. VTE prophylaxis - SCDs, ambulate as tolerated with PT, Lovenox 40qd, PT    8. gyn malignancy  - continue anastrozole for treatment  - consider discharge to [facility] to increase patient's performance status   9. Derm: monitor sacrum for s/s of pressure ulcer. encourage frequent changes in position and OOB during  the day

## 2019-04-01 NOTE — ED ADULT NURSE NOTE - NSIMPLEMENTINTERV_GEN_ALL_ED
Implemented All Fall Risk Interventions:  [place] to call system. Call bell, personal items and telephone within reach. Instruct patient to call for assistance. Room bathroom lighting operational. Non-slip footwear when patient is off stretcher. Physically safe environment: no spills, clutter or unnecessary equipment. Stretcher in lowest position, wheels locked, appropriate side rails in place. Provide visual cue, wrist band, yellow gown, etc. Monitor gait and stability. Monitor for mental status changes and reorient to person, place, and time. Review medications for side effects contributing to fall risk. Reinforce activity limits and safety measures with patient and family.

## 2019-04-01 NOTE — PROGRESS NOTE ADULT - SUBJECTIVE AND OBJECTIVE BOX
GYN PROGRESS NOTE PGY4    Patient evaluated at the bedside. In ER holding but moved to ED for closer BP monitoring. Currently the patient is being resuscitated with adequate response. She continues to have pain "all over" and reports "being cold." Ostomy is leaking and being changed at the bedside. Received IV Vanc/zosyn x 1 in ED.     T(C): 36.6 (04-01-19 @ 06:56), Max: 38.9 (03-31-19 @ 23:40)  HR: 103 (04-01-19 @ 06:56) (83 - 108)  BP: 117/56 (04-01-19 @ 06:56) (82/51 - 129/72)  RR: 18 (04-01-19 @ 06:56) (16 - 18)  SpO2: 100% (04-01-19 @ 06:56) (95% - 100%)    GEN: patient appears ill, pale  LUNGS: no acute respiratory distress  ABD: soft, non tender, ostomy leaking, no CVA tenderness, baseline fungal infection/irritation on low back  EXT: no calf tenderness, PICC site no signs of infection, 2 additional peripheral IVs on left side      MEDICATIONS  (STANDING):  anastrozole 1 milliGRAM(s) Oral daily  enoxaparin Injectable 40 milliGRAM(s) SubCutaneous daily  famotidine Injectable 20 milliGRAM(s) IV Push two times a day  lactated ringers. 1000 milliLiter(s) (90 mL/Hr) IV Continuous <Continuous>  nystatin/triamcinolone Cream 1 Application(s) Topical every 12 hours  pantoprazole  Injectable 40 milliGRAM(s) IV Push daily    MEDICATIONS  (PRN):  acetaminophen   Tablet .. 650 milliGRAM(s) Oral every 6 hours PRN Mild Pain (1 - 3)  ibuprofen  Tablet. 600 milliGRAM(s) Oral every 6 hours PRN Moderate Pain (4 - 6)                            8.8    7.43  )-----------( 238      ( 01 Apr 2019 05:47 )             30.4

## 2019-04-02 LAB
-  CANDIDA ALBICANS: SIGNIFICANT CHANGE UP
-  CANDIDA GLABRATA: SIGNIFICANT CHANGE UP
-  CANDIDA KRUSEI: SIGNIFICANT CHANGE UP
-  CANDIDA PARAPSILOSIS: SIGNIFICANT CHANGE UP
-  CANDIDA TROPICALIS: SIGNIFICANT CHANGE UP
-  COAGULASE NEGATIVE STAPHYLOCOCCUS: SIGNIFICANT CHANGE UP
-  COAGULASE NEGATIVE STAPHYLOCOCCUS: SIGNIFICANT CHANGE UP
-  K. PNEUMONIAE GROUP: SIGNIFICANT CHANGE UP
-  KPC RESISTANCE GENE: SIGNIFICANT CHANGE UP
-  STREPTOCOCCUS SP. (NOT GRP A, B OR S PNEUMONIAE): SIGNIFICANT CHANGE UP
A BAUMANNII DNA SPEC QL NAA+PROBE: SIGNIFICANT CHANGE UP
A1AT SERPL-MCNC: 232 MG/DL — HIGH (ref 90–200)
ALBUMIN SERPL ELPH-MCNC: 2.8 G/DL — LOW (ref 3.3–5)
ALP SERPL-CCNC: 344 U/L — HIGH (ref 40–120)
ALT FLD-CCNC: 375 U/L — HIGH (ref 10–45)
ANION GAP SERPL CALC-SCNC: 9 MMOL/L — SIGNIFICANT CHANGE UP (ref 5–17)
AST SERPL-CCNC: 300 U/L — HIGH (ref 10–40)
BASOPHILS # BLD AUTO: 0.01 K/UL — SIGNIFICANT CHANGE UP (ref 0–0.2)
BASOPHILS NFR BLD AUTO: 0.1 % — SIGNIFICANT CHANGE UP (ref 0–2)
BILIRUB DIRECT SERPL-MCNC: <0.2 MG/DL — SIGNIFICANT CHANGE UP (ref 0–0.2)
BILIRUB INDIRECT FLD-MCNC: >0.2 MG/DL — SIGNIFICANT CHANGE UP (ref 0.2–1)
BILIRUB SERPL-MCNC: 0.4 MG/DL — SIGNIFICANT CHANGE UP (ref 0.2–1.2)
BUN SERPL-MCNC: 12 MG/DL — SIGNIFICANT CHANGE UP (ref 7–23)
CALCIUM SERPL-MCNC: 8.6 MG/DL — SIGNIFICANT CHANGE UP (ref 8.4–10.5)
CHLORIDE SERPL-SCNC: 108 MMOL/L — SIGNIFICANT CHANGE UP (ref 96–108)
CO2 SERPL-SCNC: 21 MMOL/L — LOW (ref 22–31)
CREAT SERPL-MCNC: 0.57 MG/DL — SIGNIFICANT CHANGE UP (ref 0.5–1.3)
CRP SERPL-MCNC: 12.89 MG/DL — HIGH (ref 0–0.4)
E CLOAC COMP DNA BLD POS QL NAA+PROBE: SIGNIFICANT CHANGE UP
E COLI DNA BLD POS QL NAA+NON-PROBE: SIGNIFICANT CHANGE UP
EBV EA AB SER IA-ACNC: <5 U/ML — SIGNIFICANT CHANGE UP
EBV EA AB TITR SER IF: POSITIVE
EBV EA IGG SER-ACNC: NEGATIVE — SIGNIFICANT CHANGE UP
EBV NA IGG SER IA-ACNC: 31.7 U/ML — HIGH
EBV PATRN SPEC IB-IMP: SIGNIFICANT CHANGE UP
EBV VCA IGG AVIDITY SER QL IA: POSITIVE
EBV VCA IGM SER IA-ACNC: 526 U/ML — HIGH
EBV VCA IGM SER IA-ACNC: <10 U/ML — SIGNIFICANT CHANGE UP
EBV VCA IGM TITR FLD: NEGATIVE — SIGNIFICANT CHANGE UP
ENTEROCOC DNA BLD POS QL NAA+NON-PROBE: SIGNIFICANT CHANGE UP
ENTEROCOC DNA BLD POS QL NAA+NON-PROBE: SIGNIFICANT CHANGE UP
EOSINOPHIL # BLD AUTO: 0.01 K/UL — SIGNIFICANT CHANGE UP (ref 0–0.5)
EOSINOPHIL NFR BLD AUTO: 0.1 % — SIGNIFICANT CHANGE UP (ref 0–6)
ERYTHROCYTE [SEDIMENTATION RATE] IN BLOOD: 50 MM/HR — HIGH
FERRITIN SERPL-MCNC: 1357 NG/ML — HIGH (ref 15–150)
GLUCOSE SERPL-MCNC: 160 MG/DL — HIGH (ref 70–99)
GP B STREP DNA BLD POS QL NAA+NON-PROBE: SIGNIFICANT CHANGE UP
GRAM STN FLD: SIGNIFICANT CHANGE UP
GRAM STN FLD: SIGNIFICANT CHANGE UP
HAEM INFLU DNA BLD POS QL NAA+NON-PROBE: SIGNIFICANT CHANGE UP
HAV IGM SER-ACNC: SIGNIFICANT CHANGE UP
HBA1C BLD-MCNC: 5.3 % — SIGNIFICANT CHANGE UP (ref 4–5.6)
HBV CORE AB SER-ACNC: SIGNIFICANT CHANGE UP
HBV SURFACE AB SER-ACNC: SIGNIFICANT CHANGE UP
HBV SURFACE AG SER-ACNC: SIGNIFICANT CHANGE UP
HCT VFR BLD CALC: 25 % — LOW (ref 34.5–45)
HCV AB S/CO SERPL IA: 0.14 S/CO — SIGNIFICANT CHANGE UP
HCV AB SERPL-IMP: SIGNIFICANT CHANGE UP
HGB BLD-MCNC: 7.4 G/DL — LOW (ref 11.5–15.5)
HSV1 IGG SER-ACNC: >62.2 INDEX — HIGH
HSV1 IGG SERPL QL IA: POSITIVE
HSV2 IGG FLD-ACNC: 0.11 INDEX — SIGNIFICANT CHANGE UP
HSV2 IGG SERPL QL IA: NEGATIVE — SIGNIFICANT CHANGE UP
IMM GRANULOCYTES NFR BLD AUTO: 0.8 % — SIGNIFICANT CHANGE UP (ref 0–1.5)
IRON SATN MFR SERPL: 11 UG/DL — LOW (ref 30–160)
IRON SATN MFR SERPL: 8 % — LOW (ref 14–50)
K OXYTOCA DNA BLD POS QL NAA+NON-PROBE: SIGNIFICANT CHANGE UP
L MONOCYTOG DNA BLD POS QL NAA+NON-PROBE: SIGNIFICANT CHANGE UP
LYMPHOCYTES # BLD AUTO: 0.81 K/UL — LOW (ref 1–3.3)
LYMPHOCYTES # BLD AUTO: 11.2 % — LOW (ref 13–44)
MAGNESIUM SERPL-MCNC: 1.4 MG/DL — LOW (ref 1.6–2.6)
MCHC RBC-ENTMCNC: 29.6 GM/DL — LOW (ref 32–36)
MCHC RBC-ENTMCNC: 30.1 PG — SIGNIFICANT CHANGE UP (ref 27–34)
MCV RBC AUTO: 101.6 FL — HIGH (ref 80–100)
METHOD TYPE: SIGNIFICANT CHANGE UP
METHOD TYPE: SIGNIFICANT CHANGE UP
MONOCYTES # BLD AUTO: 0.46 K/UL — SIGNIFICANT CHANGE UP (ref 0–0.9)
MONOCYTES NFR BLD AUTO: 6.4 % — SIGNIFICANT CHANGE UP (ref 2–14)
MRSA SPEC QL CULT: SIGNIFICANT CHANGE UP
MSSA DNA SPEC QL NAA+PROBE: SIGNIFICANT CHANGE UP
N MEN ISLT CULT: SIGNIFICANT CHANGE UP
NEUTROPHILS # BLD AUTO: 5.87 K/UL — SIGNIFICANT CHANGE UP (ref 1.8–7.4)
NEUTROPHILS NFR BLD AUTO: 81.4 % — HIGH (ref 43–77)
NRBC # BLD: 0 /100 WBCS — SIGNIFICANT CHANGE UP (ref 0–0)
P AERUGINOSA DNA BLD POS NAA+NON-PROBE: SIGNIFICANT CHANGE UP
PHOSPHATE SERPL-MCNC: 2.9 MG/DL — SIGNIFICANT CHANGE UP (ref 2.5–4.5)
PLATELET # BLD AUTO: 211 K/UL — SIGNIFICANT CHANGE UP (ref 150–400)
POTASSIUM SERPL-MCNC: 3.6 MMOL/L — SIGNIFICANT CHANGE UP (ref 3.5–5.3)
POTASSIUM SERPL-SCNC: 3.6 MMOL/L — SIGNIFICANT CHANGE UP (ref 3.5–5.3)
PROT SERPL-MCNC: 5.9 G/DL — LOW (ref 6–8.3)
RBC # BLD: 2.46 M/UL — LOW (ref 3.8–5.2)
RBC # FLD: 16.1 % — HIGH (ref 10.3–14.5)
RETICS #: 66.8 K/UL — SIGNIFICANT CHANGE UP (ref 25–125)
RETICS/RBC NFR: 2.8 % — HIGH (ref 0.5–2.5)
S MARCESCENS DNA BLD POS NAA+NON-PROBE: SIGNIFICANT CHANGE UP
S PNEUM DNA BLD POS QL NAA+NON-PROBE: SIGNIFICANT CHANGE UP
S PYO DNA BLD POS QL NAA+NON-PROBE: SIGNIFICANT CHANGE UP
SODIUM SERPL-SCNC: 138 MMOL/L — SIGNIFICANT CHANGE UP (ref 135–145)
TIBC SERPL-MCNC: 144 UG/DL — LOW (ref 220–430)
TSH SERPL-MCNC: 0.77 UIU/ML — SIGNIFICANT CHANGE UP (ref 0.35–4.94)
UIBC SERPL-MCNC: 133 UG/DL — SIGNIFICANT CHANGE UP (ref 110–370)
WBC # BLD: 7.22 K/UL — SIGNIFICANT CHANGE UP (ref 3.8–10.5)
WBC # FLD AUTO: 7.22 K/UL — SIGNIFICANT CHANGE UP (ref 3.8–10.5)

## 2019-04-02 PROCEDURE — 99223 1ST HOSP IP/OBS HIGH 75: CPT

## 2019-04-02 PROCEDURE — 99231 SBSQ HOSP IP/OBS SF/LOW 25: CPT

## 2019-04-02 PROCEDURE — 76705 ECHO EXAM OF ABDOMEN: CPT | Mod: 26

## 2019-04-02 RX ORDER — MAGNESIUM SULFATE 500 MG/ML
4 VIAL (ML) INJECTION ONCE
Qty: 0 | Refills: 0 | Status: COMPLETED | OUTPATIENT
Start: 2019-04-02 | End: 2019-04-02

## 2019-04-02 RX ORDER — IBUPROFEN 200 MG
600 TABLET ORAL
Qty: 0 | Refills: 0 | Status: DISCONTINUED | OUTPATIENT
Start: 2019-04-02 | End: 2019-04-03

## 2019-04-02 RX ORDER — ELECTROLYTE SOLUTION,INJ
1 VIAL (ML) INTRAVENOUS
Qty: 0 | Refills: 0 | Status: DISCONTINUED | OUTPATIENT
Start: 2019-04-02 | End: 2019-04-02

## 2019-04-02 RX ORDER — SODIUM,POTASSIUM PHOSPHATES 278-250MG
1 POWDER IN PACKET (EA) ORAL
Qty: 0 | Refills: 0 | Status: COMPLETED | OUTPATIENT
Start: 2019-04-02 | End: 2019-04-03

## 2019-04-02 RX ADMIN — Medication 1 APPLICATION(S): at 11:38

## 2019-04-02 RX ADMIN — SODIUM CHLORIDE 90 MILLILITER(S): 9 INJECTION, SOLUTION INTRAVENOUS at 05:46

## 2019-04-02 RX ADMIN — PIPERACILLIN AND TAZOBACTAM 200 GRAM(S): 4; .5 INJECTION, POWDER, LYOPHILIZED, FOR SOLUTION INTRAVENOUS at 22:28

## 2019-04-02 RX ADMIN — Medication 600 MILLIGRAM(S): at 23:00

## 2019-04-02 RX ADMIN — Medication 1 TABLET(S): at 22:29

## 2019-04-02 RX ADMIN — Medication 1 TABLET(S): at 11:37

## 2019-04-02 RX ADMIN — FAMOTIDINE 20 MILLIGRAM(S): 10 INJECTION INTRAVENOUS at 17:06

## 2019-04-02 RX ADMIN — PANTOPRAZOLE SODIUM 40 MILLIGRAM(S): 20 TABLET, DELAYED RELEASE ORAL at 11:36

## 2019-04-02 RX ADMIN — Medication 600 MILLIGRAM(S): at 22:29

## 2019-04-02 RX ADMIN — ENOXAPARIN SODIUM 40 MILLIGRAM(S): 100 INJECTION SUBCUTANEOUS at 11:37

## 2019-04-02 RX ADMIN — ANASTROZOLE 1 MILLIGRAM(S): 1 TABLET ORAL at 11:37

## 2019-04-02 RX ADMIN — Medication 1 APPLICATION(S): at 22:29

## 2019-04-02 RX ADMIN — PIPERACILLIN AND TAZOBACTAM 200 GRAM(S): 4; .5 INJECTION, POWDER, LYOPHILIZED, FOR SOLUTION INTRAVENOUS at 11:30

## 2019-04-02 RX ADMIN — Medication 1 EACH: at 17:16

## 2019-04-02 RX ADMIN — PIPERACILLIN AND TAZOBACTAM 200 GRAM(S): 4; .5 INJECTION, POWDER, LYOPHILIZED, FOR SOLUTION INTRAVENOUS at 05:47

## 2019-04-02 RX ADMIN — Medication 1 TABLET(S): at 17:06

## 2019-04-02 RX ADMIN — Medication 100 GRAM(S): at 12:00

## 2019-04-02 RX ADMIN — PIPERACILLIN AND TAZOBACTAM 200 GRAM(S): 4; .5 INJECTION, POWDER, LYOPHILIZED, FOR SOLUTION INTRAVENOUS at 17:06

## 2019-04-02 RX ADMIN — FAMOTIDINE 20 MILLIGRAM(S): 10 INJECTION INTRAVENOUS at 05:47

## 2019-04-02 NOTE — PROGRESS NOTE ADULT - SUBJECTIVE AND OBJECTIVE BOX
**STROKE CODE CONSULT NOTE**    HPI: PMH  breast cancer, diabetes mellitus, endometrial cancer stage 3, s/p exploratory laparotomy, enterolysis, SHAMIR, BSO, pelvic lymphadenectomy, low anterior resection mobilization of splenic flexure, end colostomy on 18 with rectovaginal fistula presenting from [facility] with fever. She has been on TPN and clears, has joseph in place.    She was previously admitted -3/25 for management of rectovaginal fistula.  She was initially kept NPO and was given TPN and octreotide daily as conservative approach to treat the fistula whilst providing nutrition. Nutrition team followed Pt daily and made recommendations for TPN and all adjustments necessary. On admission, Pt also found to have a perianal fungal infection most likely caused by stool leakage from vagina. Antifungal was applied at the region daily. Stool output from vagina significantly decreased during hospital stay. Also, urology was consulted for overflow incontinence so joseph catheter was placed. Urine culture was positive for ESBL e-coli and MRSA so Pt was put in isolation and received antibiotics.  Urine culture was repeated 72hrs after antibiotics was stopped per infectious disease recommendations and that came back as negative so isolation was not necessary anymore. Patient was discharged to [place] [facility], and was discharged on 3/25 on TPN, anastrazole daily and anticoagulation treatment.            ROS:  Constitutional: No fever, weight loss or fatigue  Eyes: No eye pain, visual disturbances, or discharge  ENMT:  No difficulty hearing, tinnitus, vertigo; No sinus or throat pain  Neck: No pain or stiffness  Respiratory: No cough, wheezing, chills or hemoptysis  Cardiovascular: No chest pain, palpitations, shortness of breath, dizziness or leg swelling  Gastrointestinal: No abdominal pain. No nausea, vomiting or hematemesis; No diarrhea or constipation. Nohematochezia.  Genitourinary: No dysuria, frequency, hematuria or incontinence  Neurological: As per HPI  Skin: No itching, burning, rashes or lesions   Endocrine: No heat or cold intolerance; No hair loss  Musculoskeletal: No joint pain or swelling; No muscle, back or extremity pain  Psychiatric: No depression, anxiety, mood swings or difficulty sleeping  Heme/Lymph: No easy bruising or bleeding gums    MEDICATIONS  (STANDING):  anastrozole 1 milliGRAM(s) Oral daily  dextrose 5% + sodium chloride 0.45%. 1000 milliLiter(s) (90 mL/Hr) IV Continuous <Continuous>  enoxaparin Injectable 40 milliGRAM(s) SubCutaneous daily  famotidine Injectable 20 milliGRAM(s) IV Push two times a day  nystatin/triamcinolone Cream 1 Application(s) Topical every 12 hours  pantoprazole  Injectable 40 milliGRAM(s) IV Push daily  Parenteral Nutrition - Adult 1 Each TPN Continuous <Continuous>  piperacillin/tazobactam IVPB. 3.375 Gram(s) IV Intermittent every 6 hours  potassium acid phosphate/sodium acid phosphate tablet (K-PHOS No. 2) 1 Tablet(s) Oral four times a day with meals    MEDICATIONS  (PRN):      Allergies    No Known Allergies    Intolerances        Vital Signs Last 24 Hrs  T(C): 36.4 (2019 08:12), Max: 36.7 (2019 16:33)  T(F): 97.6 (2019 08:12), Max: 98.1 (2019 16:33)  HR: 96 (2019 12:34) (86 - 98)  BP: 106/72 (2019 12:34) (88/61 - 106/72)  BP(mean): --  RR: 16 (2019 12:34) (16 - 17)  SpO2: 95% (2019 12:34) (95% - 100%)    PHYSICAL EXAM:  Constitutional: WDWN; NAD  Cardiovascular: RRR, no appreciable murmurs; no carotid bruits  Neurologic:  Mental status: Awake, alert and oriented x3.  Recent and remote memory intact.  Naming, repetition and comprehension intact.  Attention/concentration intact.  No dysarthria, no aphasia.  Fund of knowledge appropriate.    Cranial nerves: Pupils equally round and reactive to light, visual fields full, no nystagmus, extraocular muscles intact, V1 through V3 intact bilaterally and symmetric, face symmetric, hearing intact to finger rub, palate elevation symmetric, tongue was midline, sternocleidomastoid/shoulder shrug strength bilaterally 5/5.    Motor:  Normal bulk and tone, strength 5/5 in bilateral upper and lower extremities.   strength 5/5.  Rapid alternating movements intact and symmetric.   Sensation: Intact to light touch, proprioception, and pinprick.  No neglect.   Coordination: No dysmetria on finger-to-nose and heel-to-shin.  No clumsiness.  Reflexes: 2+ in upper and lower extremities, downgoing toes bilaterally  Gait: Narrow and steady. No ataxia.  Romberg negative    NIHSS:    Fingerstick Blood Glucose: CAPILLARY BLOOD GLUCOSE      POCT Blood Glucose.: 149 mg/dL (2019 16:55)       LABS:                        7.4    7.22  )-----------( 211      ( 2019 09:00 )             25.0     04-02    138  |  108  |  12  ----------------------------<  160<H>  3.6   |  21<L>  |  0.57    Ca    8.6      2019 08:59  Phos  2.9     04-02  Mg     1.4     04-02    TPro  5.9<L>  /  Alb  2.8<L>  /  TBili  0.4  /  DBili  <0.2  /  AST  300<H>  /  ALT  375<H>  /  AlkPhos  344<H>  04-02    PT/INR - ( 2019 00:36 )   PT: 12.8 sec;   INR: 1.13          PTT - ( 2019 00:36 )  PTT:32.3 sec      Urinalysis Basic - ( 2019 02:16 )    Color: Yellow / Appearance: Clear / S.015 / pH: x  Gluc: x / Ketone: NEGATIVE  / Bili: Negative / Urobili: 0.2 E.U./dL   Blood: x / Protein: Trace mg/dL / Nitrite: POSITIVE   Leuk Esterase: Small / RBC: < 5 /HPF / WBC 5-10 /HPF   Sq Epi: x / Non Sq Epi: 0-5 /HPF / Bacteria: Present /HPF        RADIOLOGY & ADDITIONAL STUDIES:    IV-tPA (Y/N):                                   Bolus time:  Reason IV-tPA not given:    ASSESSMENT/PLAN: **STROKE CODE CONSULT NOTE**    HPI: PMH  breast cancer, diabetes mellitus, endometrial cancer stage 3, s/p exploratory laparotomy, enterolysis, SHAMIR, BSO, pelvic lymphadenectomy, low anterior resection mobilization of splenic flexure, end colostomy on 18 with rectovaginal fistula presenting from [facility] with fever. She has been on TPN and clears, has joseph in place.    She was previously admitted -3/25 for management of rectovaginal fistula.  She was initially kept NPO and was given TPN and octreotide daily as conservative approach to treat the fistula whilst providing nutrition. Nutrition team followed Pt daily and made recommendations for TPN and all adjustments necessary. On admission, Pt also found to have a perianal fungal infection most likely caused by stool leakage from vagina. Antifungal was applied at the region daily. Stool output from vagina significantly decreased during hospital stay. Also, urology was consulted for overflow incontinence so joseph catheter was placed. Urine culture was positive for ESBL e-coli and MRSA so Pt was put in isolation and received antibiotics.  Urine culture was repeated 72hrs after antibiotics was stopped per infectious disease recommendations and that came back as negative so isolation was not necessary anymore. Patient was discharged to [place] [facility], and was discharged on 3/25 on TPN, anastrazole daily and anticoagulation treatment.        MEDICATIONS  (STANDING):  anastrozole 1 milliGRAM(s) Oral daily  dextrose 5% + sodium chloride 0.45%. 1000 milliLiter(s) (90 mL/Hr) IV Continuous <Continuous>  enoxaparin Injectable 40 milliGRAM(s) SubCutaneous daily  famotidine Injectable 20 milliGRAM(s) IV Push two times a day  nystatin/triamcinolone Cream 1 Application(s) Topical every 12 hours  pantoprazole  Injectable 40 milliGRAM(s) IV Push daily  Parenteral Nutrition - Adult 1 Each TPN Continuous <Continuous>  piperacillin/tazobactam IVPB. 3.375 Gram(s) IV Intermittent every 6 hours  potassium acid phosphate/sodium acid phosphate tablet (K-PHOS No. 2) 1 Tablet(s) Oral four times a day with meals    MEDICATIONS  (PRN):      Allergies    No Known Allergies    Intolerances        Vital Signs Last 24 Hrs  T(C): 36.4 (2019 08:12), Max: 36.7 (2019 16:33)  T(F): 97.6 (2019 08:12), Max: 98.1 (2019 16:33)  HR: 96 (2019 12:34) (86 - 98)  BP: 106/72 (2019 12:34) (88/61 - 106/72)  BP(mean): --  RR: 16 (2019 12:34) (16 - 17)  SpO2: 95% (2019 12:34) (95% - 100%)    PHYSICAL EXAM:  Constitutional: no acute distress   Mental status: Awake, alert and oriented x3.  Recent and remote memory intact.  Naming, repetition and comprehension intact.  Attention/concentration intact.  No dysarthria, no aphasia.  Fund of knowledge appropriate.    Cranial nerves: Pupils equally round and reactive to light, visual fields full, no nystagmus, some restriction inn vertical eye movement with exophthalmos noted  V1 through V3 intact bilaterally and symmetric, face symmetric, hearing intact to finger rub, palate elevation symmetric, tongue was midline, sternocleidomastoid/shoulder shrug strength bilaterally 5/5.    Motor:  Normal bulk.   Rapid alternating movements intact and symmetric. On exam motor strength with Ankle strength is 1/5, knee 4/5,thigh flexion and extension 2/5, Exophthalmos noted on exam .  No dysmetria noted on exam . Atrophy _ve. Pt unable to wiggle toes or  plantar /dorsi flex.   Sensation: Intact to light touch  Coordination: No dysmetria on finger-to-nose  Gait: unable to assess     NIHSS:    Fingerstick Blood Glucose: CAPILLARY BLOOD GLUCOSE      POCT Blood Glucose.: 149 mg/dL (2019 16:55)       LABS:                        7.4    7.22  )-----------( 211      ( 2019 09:00 )             25.0     04-02    138  |  108  |  12  ----------------------------<  160<H>  3.6   |  21<L>  |  0.57    Ca    8.6      2019 08:59  Phos  2.9     04-02  Mg     1.4     04-02    TPro  5.9<L>  /  Alb  2.8<L>  /  TBili  0.4  /  DBili  <0.2  /  AST  300<H>  /  ALT  375<H>  /  AlkPhos  344<H>  04-02    PT/INR - ( 2019 00:36 )   PT: 12.8 sec;   INR: 1.13          PTT - ( 2019 00:36 )  PTT:32.3 sec      Urinalysis Basic - ( 2019 02:16 )    Color: Yellow / Appearance: Clear / S.015 / pH: x  Gluc: x / Ketone: NEGATIVE  / Bili: Negative / Urobili: 0.2 E.U./dL   Blood: x / Protein: Trace mg/dL / Nitrite: POSITIVE   Leuk Esterase: Small / RBC: < 5 /HPF / WBC 5-10 /HPF   Sq Epi: x / Non Sq Epi: 0-5 /HPF / Bacteria: Present /HPF        RADIOLOGY & ADDITIONAL STUDIES:    IV-tPA (Y/N):                                   Bolus time:  Reason IV-tPA not given:    ASSESSMENT/PLAN: **STROKE CODE CONSULT NOTE**    HPI: PMH  breast cancer, diabetes mellitus, endometrial cancer stage 3, s/p exploratory laparotomy, enterolysis, SHAMIR, BSO, pelvic lymphadenectomy, low anterior resection mobilization of splenic flexure, end colostomy on 18 with rectovaginal fistula presenting from [facility] with fever. She has been on TPN and clears, has joseph in place.    She was previously admitted -3/25 for management of rectovaginal fistula.  She was initially kept NPO and was given TPN and octreotide daily as conservative approach to treat the fistula whilst providing nutrition. Nutrition team followed Pt daily and made recommendations for TPN and all adjustments necessary. On admission, Pt also found to have a perianal fungal infection most likely caused by stool leakage from vagina. Antifungal was applied at the region daily. Stool output from vagina significantly decreased during hospital stay. Also, urology was consulted for overflow incontinence so joseph catheter was placed. Urine culture was positive for ESBL e-coli and MRSA so Pt was put in isolation and received antibiotics.  Urine culture was repeated 72hrs after antibiotics was stopped per infectious disease recommendations and that came back as negative so isolation was not necessary anymore. Patient was discharged to [place] [facility], and was discharged on 3/25 on TPN, anastrazole daily and anticoagulation treatment.         MEDICATIONS  (STANDING):  anastrozole 1 milliGRAM(s) Oral daily  dextrose 5% + sodium chloride 0.45%. 1000 milliLiter(s) (90 mL/Hr) IV Continuous <Continuous>  enoxaparin Injectable 40 milliGRAM(s) SubCutaneous daily  famotidine Injectable 20 milliGRAM(s) IV Push two times a day  nystatin/triamcinolone Cream 1 Application(s) Topical every 12 hours  pantoprazole  Injectable 40 milliGRAM(s) IV Push daily  Parenteral Nutrition - Adult 1 Each TPN Continuous <Continuous>  piperacillin/tazobactam IVPB. 3.375 Gram(s) IV Intermittent every 6 hours  potassium acid phosphate/sodium acid phosphate tablet (K-PHOS No. 2) 1 Tablet(s) Oral four times a day with meals    MEDICATIONS  (PRN):      Allergies    No Known Allergies    Intolerances        Vital Signs Last 24 Hrs  T(C): 36.4 (2019 08:12), Max: 36.7 (2019 16:33)  T(F): 97.6 (2019 08:12), Max: 98.1 (2019 16:33)  HR: 96 (2019 12:34) (86 - 98)  BP: 106/72 (2019 12:34) (88/61 - 106/72)  BP(mean): --  RR: 16 (2019 12:34) (16 - 17)  SpO2: 95% (2019 12:34) (95% - 100%)    PHYSICAL EXAM:  Constitutional: no acute distress   Mental status: Awake, alert and oriented x3.  Recent and remote memory intact.  Naming, repetition and comprehension intact.  Attention/concentration intact.  No dysarthria, no aphasia.  Fund of knowledge appropriate.    Cranial nerves: Pupils equally round and reactive to light, visual fields full, no nystagmus, some restriction inn vertical eye movement with exophthalmos noted  V1 through V3 intact bilaterally and symmetric, face symmetric, hearing intact to finger rub, palate elevation symmetric, tongue was midline, sternocleidomastoid/shoulder shrug strength bilaterally 5/5.    Motor:  Normal bulk.   Rapid alternating movements intact and symmetric. On exam motor strength with Ankle strength is 1/5, knee 4/5,thigh flexion and extension 2/5, Exophthalmos noted on exam .  No dysmetria noted on exam . Atrophy _ve. Pt unable to wiggle toes or  plantar /dorsi flex.   Sensation: Intact to light touch  Coordination: No dysmetria on finger-to-nose  Gait: unable to assess     NIHSS:    Fingerstick Blood Glucose: CAPILLARY BLOOD GLUCOSE      POCT Blood Glucose.: 149 mg/dL (2019 16:55)       LABS:                        7.4    7.22  )-----------( 211      ( 2019 09:00 )             25.0     04-02    138  |  108  |  12  ----------------------------<  160<H>  3.6   |  21<L>  |  0.57    Ca    8.6      2019 08:59  Phos  2.9     04-02  Mg     1.4     04-02    TPro  5.9<L>  /  Alb  2.8<L>  /  TBili  0.4  /  DBili  <0.2  /  AST  300<H>  /  ALT  375<H>  /  AlkPhos  344<H>  04-02    PT/INR - ( 2019 00:36 )   PT: 12.8 sec;   INR: 1.13          PTT - ( 2019 00:36 )  PTT:32.3 sec      Urinalysis Basic - ( 2019 02:16 )    Color: Yellow / Appearance: Clear / S.015 / pH: x  Gluc: x / Ketone: NEGATIVE  / Bili: Negative / Urobili: 0.2 E.U./dL   Blood: x / Protein: Trace mg/dL / Nitrite: POSITIVE   Leuk Esterase: Small / RBC: < 5 /HPF / WBC 5-10 /HPF   Sq Epi: x / Non Sq Epi: 0-5 /HPF / Bacteria: Present /HPF        RADIOLOGY & ADDITIONAL STUDIES:    IV-tPA (Y/N):                                   Bolus time:  Reason IV-tPA not given:    ASSESSMENT/PLAN: HPI: PMH polio at age 6, breast cancer, diabetes mellitus, endometrial cancer stage 3, s/p exploratory laparotomy, enterolysis, SHAMIR, BSO, pelvic lymphadenectomy, low anterior resection mobilization of splenic flexure, end colostomy on 18 with rectovaginal fistula presenting from [facility] with fever. She has been on TPN and clears, has joseph in place.    She was previously admitted -3/25 for management of rectovaginal fistula.  She was initially kept NPO and was given TPN and octreotide daily as conservative approach to treat the fistula whilst providing nutrition. Nutrition team followed Pt daily and made recommendations for TPN and all adjustments necessary. On admission, Pt also found to have a perianal fungal infection most likely caused by stool leakage from vagina. Antifungal was applied at the region daily. Stool output from vagina significantly decreased during hospital stay. Also, urology was consulted for overflow incontinence so joseph catheter was placed. Urine culture was positive for ESBL e-coli and MRSA so Pt was put in isolation and received antibiotics.  Urine culture was repeated 72hrs after antibiotics was stopped per infectious disease recommendations and that came back as negative so isolation was not necessary anymore. Patient was discharged to [place] [facility], and was discharged on 3/25 on TPN, anastrazole daily and anticoagulation treatment.       MEDICATIONS  (STANDING):  anastrozole 1 milliGRAM(s) Oral daily  dextrose 5% + sodium chloride 0.45%. 1000 milliLiter(s) (90 mL/Hr) IV Continuous <Continuous>  enoxaparin Injectable 40 milliGRAM(s) SubCutaneous daily  famotidine Injectable 20 milliGRAM(s) IV Push two times a day  nystatin/triamcinolone Cream 1 Application(s) Topical every 12 hours  pantoprazole  Injectable 40 milliGRAM(s) IV Push daily  Parenteral Nutrition - Adult 1 Each TPN Continuous <Continuous>  piperacillin/tazobactam IVPB. 3.375 Gram(s) IV Intermittent every 6 hours  potassium acid phosphate/sodium acid phosphate tablet (K-PHOS No. 2) 1 Tablet(s) Oral four times a day with meals    MEDICATIONS  (PRN):      Allergies    No Known Allergies    Intolerances        Vital Signs Last 24 Hrs  T(C): 36.4 (2019 08:12), Max: 36.7 (2019 16:33)  T(F): 97.6 (2019 08:12), Max: 98.1 (2019 16:33)  HR: 96 (2019 12:34) (86 - 98)  BP: 106/72 (2019 12:34) (88/61 - 106/72)  BP(mean): --  RR: 16 (2019 12:34) (16 - 17)  SpO2: 95% (2019 12:34) (95% - 100%)    PHYSICAL EXAM:  Constitutional: no acute distress   Mental status: Awake, alert and oriented x3.  Recent and remote memory intact.  Naming, repetition and comprehension intact.  Attention/concentration intact.  No dysarthria, no aphasia.  Fund of knowledge appropriate.    Cranial nerves: Pupils equally round and reactive to light, visual fields full, no nystagmus, some restriction inn vertical eye movement with exophthalmos noted  V1 through V3 intact bilaterally and symmetric, face symmetric, hearing intact to finger rub, palate elevation symmetric, tongue was midline, sternocleidomastoid/shoulder shrug strength bilaterally 5/5.    Motor:  Normal bulk.   Rapid alternating movements intact and symmetric. On exam motor strength with Ankle strength is 1/5, knee 4/5,thigh flexion and extension 2/5, Exophthalmos noted on exam .  No dysmetria noted on exam . Atrophy _ve. Pt unable to wiggle toes or  plantar /dorsi flex.   Sensation: Intact to light touch  Coordination: No dysmetria on finger-to-nose  Gait: unable to assess     NIHSS:    Fingerstick Blood Glucose: CAPILLARY BLOOD GLUCOSE      POCT Blood Glucose.: 149 mg/dL (2019 16:55)       LABS:                        7.4    7.22  )-----------( 211      ( 2019 09:00 )             25.0     04-02    138  |  108  |  12  ----------------------------<  160<H>  3.6   |  21<L>  |  0.57    Ca    8.6      2019 08:59  Phos  2.9     04-02  Mg     1.4     04-02    TPro  5.9<L>  /  Alb  2.8<L>  /  TBili  0.4  /  DBili  <0.2  /  AST  300<H>  /  ALT  375<H>  /  AlkPhos  344<H>  04-02    PT/INR - ( 2019 00:36 )   PT: 12.8 sec;   INR: 1.13          PTT - ( 2019 00:36 )  PTT:32.3 sec      Urinalysis Basic - ( 2019 02:16 )    Color: Yellow / Appearance: Clear / S.015 / pH: x  Gluc: x / Ketone: NEGATIVE  / Bili: Negative / Urobili: 0.2 E.U./dL   Blood: x / Protein: Trace mg/dL / Nitrite: POSITIVE   Leuk Esterase: Small / RBC: < 5 /HPF / WBC 5-10 /HPF   Sq Epi: x / Non Sq Epi: 0-5 /HPF / Bacteria: Present /HPF        RADIOLOGY & ADDITIONAL STUDIES:    IV-tPA (Y/N):                                   Bolus time:  Reason IV-tPA not given:    ASSESSMENT/PLAN:

## 2019-04-02 NOTE — ADVANCED PRACTICE NURSE CONSULT - RECOMMEDATIONS
Patient unable to participate in changing ostomy pouching system. Patient would benefit from LITO. Discussed the same with .

## 2019-04-02 NOTE — PROGRESS NOTE ADULT - ASSESSMENT
PMH  breast cancer, diabetes mellitus, endometrial cancer stage 3, s/p exploratory laparotomy, enterolysis, SHAMIR, BSO, pelvic lymphadenectomy, low anterior resection mobilization of splenic flexure, end colostomy on 7/30/18 with rectovaginal fistula presenting from [facility] with fever. She has been on TPN and clears, has joseph in place.  Neurology consulted in setting of worsening LE weakness .     Assessment / Plan Pt reports hx of polio at age 6. Since last July when she was discharged to [facility] she reports worsening weakness b/l LE extremities with need for walker. Neurology consulted for b/l weakness  Etiology of weakness related to post polio syndrome vs element of deconditioning vs spinal mets however unlikely and now also ruled out by lumbar MRI   -Lumbar spine MRI showing  Diffuse central canal stenosis throughout the lumbar spine, greatest at L4-L5 level, due to both acquired etiologies from facet arthropathy, disc disease, and epidural lipomatosis as well as a congenital etiology from short pedicle morphology. Right foraminal-lateral disc protrusion on background of diffuse bulge with greater right-sided foraminal narrowing and lateral recess stenosis   which may be affecting the traversing right L5 and/or foraminal right L4 nerve roots. Diffuse disc bulges L2-L3, L4, L5-S1 levels. Multilevel foraminal narrowing, as above. Lumbar kyphosis, apex at L2-L3 level  -On exam motor strength with Ankle strength 1/5, knee 4/5,thigh flexion extension 2/5, Exophthalmos noted on exam .  No dysmetria noted on exam . Atrophy _ve. Pt unable to wiggle toes oo, plantar /dorsi flex.   -Etiology of weakness related to  polio syndrome given hx of polio at age 6 however her progression of weakness over last 1 year is a bit faster and unusual for polio syndrome. Element of deconditioning present however her weakness cannot be entirely attributed to that.   -Please obtain TSH  -Neurology will continue to follow, consider outpatient EMG studies. PMH  breast cancer, diabetes mellitus, endometrial cancer stage 3, s/p exploratory laparotomy, enterolysis, SHAMIR, BSO, pelvic lymphadenectomy, low anterior resection mobilization of splenic flexure, end colostomy on 7/30/18 with rectovaginal fistula presenting from [facility] with fever. She has been on TPN and clears, has joseph in place.  Neurology consulted in setting of worsening LE weakness .     Assessment / Plan Pt reports hx of polio at age 6. Since last July when she was discharged to [facility] she reports worsening weakness b/l LE extremities with need for walker. Neurology consulted for b/l weakness  Etiology of weakness related to post polio syndrome vs element of deconditioning vs spinal mets however unlikely and now also ruled out by lumbar MRI   -Lumbar spine MRI showing  Diffuse central canal stenosis throughout the lumbar spine, greatest at L4-L5 level, due to both acquired etiologies from facet arthropathy, disc disease, and epidural lipomatosis as well as a congenital etiology from short pedicle morphology. Right foraminal-lateral disc protrusion on background of diffuse bulge with greater right-sided foraminal narrowing and lateral recess stenosis   which may be affecting the traversing right L5 and/or foraminal right L4 nerve roots. Diffuse disc bulges L2-L3, L4, L5-S1 levels. Multilevel foraminal narrowing, as above. Lumbar kyphosis, apex at L2-L3 level  -On exam motor strength with Ankle strength 1/5, knee 4/5,thigh flexion extension 2/5, Exophthalmos noted on exam .  No dysmetria noted on exam . Atrophy _ve. Pt unable to wiggle toes or plantar /dorsi flex feet. Sensation in intact with downgoing toes.   -Etiology of weakness related to  polio syndrome given hx of polio at age 6 however her progression of weakness over last 1 year is a bit faster and unusual for polio syndrome. Element of deconditioning present however her weakness cannot be entirely attributed to that.   -Please obtain TSH  -Neurology will continue to follow, consider outpatient EMG studies. 60yo F with stage IV endometrial adenocarcinoma s/p staging surgery '18 and 1 round of chemotherapy 2/19, previously admitted for management of symptomatic rectovaginal and enterovaginal fistulas 2/22-3/25, now presenting from [facility] with fever.   Neurology consulted in setting of worsening LE weakness .     Assessment / Plan Pt with  hx of polio at age 6. Since last July when she was discharged to [facility] she reports worsening weakness b/l LE extremities with need for walker. Neurology consulted for b/l weakness    -Lumbar spine MRI showing  Diffuse central canal stenosis throughout the lumbar spine, greatest at L4-L5 level, due to both acquired etiologies from facet arthropathy, disc disease, and epidural lipomatosis as well as a congenital etiology from short pedicle morphology. Right foraminal-lateral disc protrusion on background of diffuse bulge with greater right-sided foraminal narrowing and lateral recess stenosis   which may be affecting the traversing right L5 and/or foraminal right L4 nerve roots. Diffuse disc bulges L2-L3, L4, L5-S1 levels. Multilevel foraminal narrowing, as above. Lumbar kyphosis, apex at L2-L3 level    -On exam motor strength with Ankle strength 1/5, knee 4/5,thigh flexion extension 2/5, Exophthalmos noted on exam .  No dysmetria noted on exam . Atrophy _ve. Pt unable to wiggle toes or plantar /dorsi flex feet. Sensation in intact with downgoing toes.   -Etiology of weakness related to  polio syndrome given hx of polio at age 6 however her progression of weakness over last 1 year is a bit faster and unusual for polio syndrome. Element of deconditioning present however her weakness cannot be entirely attributed to that. spinal mets was also in differential  however unlikely / ruled out by lumbar MRI   -Please obtain TSH given exophthalmos and restriction of vertical eye movement.    -Neurology will continue to follow, consider outpatient EMG studies. 60yo F with stage IV endometrial adenocarcinoma s/p staging surgery '18 and 1 round of chemotherapy 2/19, previously admitted for management of symptomatic rectovaginal and enterovaginal fistulas 2/22-3/25, now presenting from [facility] with fever.   Neurology consulted in setting of worsening LE weakness .     Assessment / Plan Pt with  hx of polio at age 6. Since last July when she was discharged to [facility] she reports worsening weakness b/l LE extremities with need for walker. Neurology consulted for b/l weakness    -Lumbar spine MRI showing  Diffuse central canal stenosis throughout the lumbar spine, greatest at L4-L5 level, due to both acquired etiologies from facet arthropathy, disc disease, and epidural lipomatosis as well as a congenital etiology from short pedicle morphology. Right foraminal-lateral disc protrusion on background of diffuse bulge with greater right-sided foraminal narrowing and lateral recess stenosis   which may be affecting the traversing right L5 and/or foraminal right L4 nerve roots. Diffuse disc bulges L2-L3, L4, L5-S1 levels. Multilevel foraminal narrowing, as above. Lumbar kyphosis, apex at L2-L3 level    -On exam motor strength with Ankle strength 1/5, knee 4/5,thigh flexion extension 2/5, Exophthalmos noted on exam .  No dysmetria noted on exam . Atrophy _ve. Pt unable to wiggle toes or plantar /dorsi flex feet. Sensation in intact with downgoing toes.   -Etiology of weakness possibly related to  polio syndrome given hx of polio at age 6. Element of deconditioning present however her weakness cannot be entirely attributed to that. spinal mets was also in differential but no e/o this on lumbar MRI.   -Please obtain TSH given exophthalmos and restriction of eye movement in all directions.    -Neurology will continue to follow, consider outpatient EMG studies.

## 2019-04-02 NOTE — PHYSICAL THERAPY INITIAL EVALUATION ADULT - PERTINENT HX OF CURRENT PROBLEM, REHAB EVAL
59 year old female presenting from [facility] with fever.  She reports having myalgias starting on 3/31 and was found to be febrile to 100.5 at the [facility] facility so was sent to [facility] for further evaluation.  She denies headache, cough, SOB, chest pain, abdominal pain, vomiting, leakage of stool from fistula.  Yesterday she felt nauseous and spit up saliva but no episodes of emesis.  She has been on TPN and clears, has joseph in place.

## 2019-04-02 NOTE — CONSULT NOTE ADULT - SUBJECTIVE AND OBJECTIVE BOX
HPI:  58 yo F with history significant for breast cancer, diabetes mellitus, endometrial cancer stage 3, s/p exploratory laparotomy, enterolysis, SHAMIR, BSO, pelvic lymphadenectomy, low anterior resection mobilization of splenic flexure, end colostomy on 7/30/18 with rectovaginal fistula presenting from [facility] with fever.  She reports having myalgias starting on 3/31 and was found to be febrile to 100.5 at the [facility] facility so was sent to [facility] for further evaluation.  She denies headache, cough, SOB, chest pain, abdominal pain, vomiting, leakage of stool from fistula.  Yesterday she felt nauseous and spit up saliva but no episodes of emesis.  She has been on TPN and clears, has joseph in place.      She was previously admitted 2/22-3/25 for management of rectovaginal fistula.  She was initially kept NPO and was given TPN and octreotide daily as conservative approach to treat the fistula whilst providing nutrition. Nutrition team followed Pt daily and made recommendations for TPN and all adjustments necessary. On admission, Pt also found to have a perianal fungal infection most likely caused by stool leakage from vagina. Antifungal was applied at the region daily. Stool output from vagina significantly decreased during hospital stay. Also, urology was consulted for overflow incontinence so joseph catheter was placed. Urine output was noticed to be increasingly high so endocrinologist was consulted to rule out diabetes insipidus, which Pt was found not to have after DI workup. Urine culture was positive for ESBL e-coli and MRSA so Pt was put in isolation and received antibiotics.  Urine culture was repeated 72hrs after antibiotics was stopped per infectious disease recommendations and that came back as negative so isolation was not necessary anymore. Patient was discharged to [facility], and was discharged on 3/25 on TPN, anastrazole daily and anticoagulation treatment.    OB/GYN Hx: G0, endometrial cancer dx in 7/2018; h/o breast cancer in 2009 s/p chemo and radiation with left breast lumpectomy  PMHx: T2DM, polio in childhood, h/o breast cancer  SHx: left breast lumpectomy, right knee surgery with screw placement 2001, 7/2018 exploratory laparotomy, enterolysis, SHAMIR, BSO, pelvic lymphadenectomy, low anterior resection mobilization of splenic flexure, end colostomy   Meds: metformin [id number] BID, nexium  Allergies: NKDA    PHYSICAL EXAM:   Vital Signs Last 24 Hrs  T(C): 38.9 (31 Mar 2019 23:40), Max: 38.9 (31 Mar 2019 23:40)  T(F): 102 (31 Mar 2019 23:40), Max: 102 (31 Mar 2019 23:40)  HR: 108 (31 Mar 2019 23:40) (108 - 108)  BP: 101/56 (31 Mar 2019 23:40) (101/56 - 101/56)  BP(mean): --  RR: 16 (31 Mar 2019 23:40) (16 - 16)  SpO2: 96% (31 Mar 2019 23:40) (96% - 96%)    **************************  Constitutional: Alert & Oriented x3, No acute distress  Respiratory: Clear to ausculation bilaterally; no wheezing, rhonchi, or crackles  Cardiovascular: regular rate and rhythm, no murmurs, or gallops  Gastrointestinal: soft, non tender, hypoactivebowel sounds, no rebound or guarding   No CVA tenderness bilaterally  Pelvic exam: no decubitus ulcer noted, inguinal fungal infection stable from previously with nystatin cream in place  Extremities: no calf tenderness or swelling, no erythema, LE sizes equal bilaterally      LABS:                        8.9    9.62  )-----------( 277      ( 01 Apr 2019 00:36 )             27.2     04-01    136  |  102  |  22  ----------------------------<  162<H>  4.6   |  21<L>  |  0.59    Ca    9.2      01 Apr 2019 00:36    TPro  7.0  /  Alb  3.6  /  TBili  0.3  /  DBili  x   /  AST  73<H>  /  ALT  48<H>  /  AlkPhos  129<H>  04-01    PT/INR - ( 01 Apr 2019 00:36 )   PT: 12.8 sec;   INR: 1.13          PTT - ( 01 Apr 2019 00:36 )  PTT:32.3 sec        RADIOLOGY & ADDITIONAL STUDIES: (01 Apr 2019 02:36)    Allergies    No Known Allergies    Intolerances      Home Medications:  anastrozole 1 mg oral tablet: 1 tab(s) orally once a day (25 Mar 2019 09:04)  nystatin 100,000 units/g topical cream: 1 application topically every 12 hours (25 Mar 2019 09:04)  zinc oxide 20% topical ointment: 1 application topically 3 times a day (25 Mar 2019 09:04)    MEDICATIONS:  MEDICATIONS  (STANDING):  anastrozole 1 milliGRAM(s) Oral daily  dextrose 5% + sodium chloride 0.45%. 1000 milliLiter(s) (90 mL/Hr) IV Continuous <Continuous>  enoxaparin Injectable 40 milliGRAM(s) SubCutaneous daily  famotidine Injectable 20 milliGRAM(s) IV Push two times a day  nystatin/triamcinolone Cream 1 Application(s) Topical every 12 hours  pantoprazole  Injectable 40 milliGRAM(s) IV Push daily  piperacillin/tazobactam IVPB. 3.375 Gram(s) IV Intermittent every 6 hours    MEDICATIONS  (PRN):    PAST MEDICAL & SURGICAL HISTORY:    FAMILY HISTORY:    SOCIAL HISTORY:  Tobacoo: [ ] Current, [ ] Former, [ ] Never; Pack Years:  Alcohol:  Illicit Drugs:    REVIEW OF SYSTEMS:  CONSTITUTIONAL: No weakness, fevers or chills  HEENT: No visual changes; No vertigo or throat pain   NECK: No pain or stiffness  RESPIRATORY: No cough, wheezing, hemoptysis; No shortness of breath  CARDIOVASCULAR: No chest pain or palpitations  GASTROINTESTINAL: No abdominal or epigastric pain. No nausea, vomiting, or hematemesis; No diarrhea or constipation. No melena or hematochezia.  GENITOURINARY: No dysuria, frequency or hematuria  NEUROLOGICAL: No numbness or weakness  SKIN: No itching, burning, rashes, or lesions   All other 10 review of systems is negative unless indicated above.    Vital Signs Last 24 Hrs  T(C): 36 (02 Apr 2019 05:47), Max: 38 (01 Apr 2019 08:55)  T(F): 96.8 (02 Apr 2019 05:47), Max: 100.4 (01 Apr 2019 08:55)  HR: 91 (02 Apr 2019 05:47) (74 - 98)  BP: 104/70 (02 Apr 2019 05:47) (88/61 - 104/70)  BP(mean): --  RR: 16 (02 Apr 2019 05:47) (16 - 18)  SpO2: 95% (02 Apr 2019 05:47) (95% - 100%)    04-01 @ 07:01  -  04-02 @ 07:00  --------------------------------------------------------  IN: 2575 mL / OUT: 1750 mL / NET: 825 mL        PHYSICAL EXAM:    General: Well developed; well nourished; in no acute distress  Eyes: Anicteric sclerae, moist conjunctivae  HENT: Moist mucous membranes  Neck: Trachea midline, supple  Lungs: Normal respiratory effors and no intercostal retractions  Cardiovascular: RRR  Abdomen: Soft, non-tender non-distended; Normal bowel sounds; No rebound or guarding  Extremities: Normal range of motion, No clubbing, cyanosis or edema  Neurological: Alert and oriented x3  Skin: Warm and dry. No obvious rash    LABS:                        8.8    7.43  )-----------( 238      ( 01 Apr 2019 05:47 )             30.4     04-01    141  |  112<H>  |  16  ----------------------------<  133<H>  4.5   |  17<L>  |  0.53    Ca    8.1<L>      01 Apr 2019 05:47  Phos  3.8     04-01  Mg     1.6     04-01    TPro  6.9  /  Alb  3.2<L>  /  TBili  0.7  /  DBili  x   /  AST  850<H>  /  ALT  489<H>  /  AlkPhos  211<H>  04-01        PT/INR - ( 01 Apr 2019 00:36 )   PT: 12.8 sec;   INR: 1.13          PTT - ( 01 Apr 2019 00:36 )  PTT:32.3 sec    Culture - Blood (collected 01 Apr 2019 05:59)  Source: .Blood Blood-Peripheral  Preliminary Report (02 Apr 2019 06:00):    No growth at 1 day.    Culture - Blood (collected 01 Apr 2019 05:59)  Source: .Blood Blood-Peripheral  Preliminary Report (02 Apr 2019 07:00):    No growth at 1 day.      RADIOLOGY & ADDITIONAL STUDIES: HPI:  60 yo F with history significant for breast cancer, diabetes mellitus, endometrial cancer stage 3, s/p exploratory laparotomy, enterolysis, SHAMIR, BSO, pelvic lymphadenectomy, low anterior resection mobilization of splenic flexure, end colostomy on 18 with rectovaginal fistula presenting from [facility] with fever.  She reports having myalgias starting on 3/31 and was found to be febrile to 100.5 at the [facility] facility so was sent to [facility] for further evaluation.  GI consulted for abnormal LFTS.     Pt denies taking herbal medications/new medications. She only takes what is prescribed to her. Last week, she took 2 tylenols but does not take tyelnol regularly. She has never been told she has liver disease. Her sister  from cirrhosis of the liver / ETOH use, but she denies ETOH use, drug use. No recent travel or sick contacts.    Of note,she was previously admitted -3/25 for management of rectovaginal fistula, at which time she was started on TPN.        PHYSICAL EXAM:   Vital Signs Last 24 Hrs  T(C): 38.9 (31 Mar 2019 23:40), Max: 38.9 (31 Mar 2019 23:40)  T(F): 102 (31 Mar 2019 23:40), Max: 102 (31 Mar 2019 23:40)  HR: 108 (31 Mar 2019 23:40) (108 - 108)  BP: 101/56 (31 Mar 2019 23:40) (101/56 - 101/56)  BP(mean): --  RR: 16 (31 Mar 2019 23:40) (16 - 16)  SpO2: 96% (31 Mar 2019 23:40) (96% - 96%)    **************************  Constitutional: Alert & Oriented x3, No acute distress  Respiratory: Clear to ausculation bilaterally; no wheezing, rhonchi, or crackles  Cardiovascular: regular rate and rhythm, no murmurs, or gallops  Gastrointestinal: soft, non tender, hypoactivebowel sounds, no rebound or guarding   No CVA tenderness bilaterally  Pelvic exam: no decubitus ulcer noted, inguinal fungal infection stable from previously with nystatin cream in place  Extremities: no calf tenderness or swelling, no erythema, LE sizes equal bilaterally      LABS:                        8.9    9.62  )-----------( 277      ( 2019 00:36 )             27.2     04-    136  |  102  |  22  ----------------------------<  162<H>  4.6   |  21<L>  |  0.59    Ca    9.2      2019 00:36    TPro  7.0  /  Alb  3.6  /  TBili  0.3  /  DBili  x   /  AST  73<H>  /  ALT  48<H>  /  AlkPhos  129<H>      PT/INR - ( 2019 00:36 )   PT: 12.8 sec;   INR: 1.13          PTT - ( 2019 00:36 )  PTT:32.3 sec        RADIOLOGY & ADDITIONAL STUDIES: (2019 02:36)    Allergies    No Known Allergies    Intolerances      Home Medications:  anastrozole 1 mg oral tablet: 1 tab(s) orally once a day (25 Mar 2019 09:04)  nystatin 100,000 units/g topical cream: 1 application topically every 12 hours (25 Mar 2019 09:04)  zinc oxide 20% topical ointment: 1 application topically 3 times a day (25 Mar 2019 09:04)    MEDICATIONS:  MEDICATIONS  (STANDING):  anastrozole 1 milliGRAM(s) Oral daily  dextrose 5% + sodium chloride 0.45%. 1000 milliLiter(s) (90 mL/Hr) IV Continuous <Continuous>  enoxaparin Injectable 40 milliGRAM(s) SubCutaneous daily  famotidine Injectable 20 milliGRAM(s) IV Push two times a day  nystatin/triamcinolone Cream 1 Application(s) Topical every 12 hours  pantoprazole  Injectable 40 milliGRAM(s) IV Push daily  piperacillin/tazobactam IVPB. 3.375 Gram(s) IV Intermittent every 6 hours    MEDICATIONS  (PRN):    PAST MEDICAL & SURGICAL HISTORY:    FAMILY HISTORY:  Sister: Cirrhosis of the liver, ETOH use    SOCIAL HISTORY:  Tobacoo: denies  Alcohol: denies  Illicit Drugs: denies    REVIEW OF SYSTEMS:  CONSTITUTIONAL: No weakness, fevers or chills  HEENT: No visual changes; No vertigo or throat pain   NECK: No pain or stiffness  RESPIRATORY: No cough, wheezing, hemoptysis; No shortness of breath  CARDIOVASCULAR: No chest pain or palpitations  GASTROINTESTINAL: No abdominal or epigastric pain. No nausea, vomiting, or hematemesis; No diarrhea or constipation. No melena or hematochezia.  GENITOURINARY: No dysuria, frequency or hematuria  NEUROLOGICAL: No numbness or weakness  SKIN: No itching, burning, rashes, or lesions   All other 10 review of systems is negative unless indicated above.    Vital Signs Last 24 Hrs  T(C): 36 (2019 05:47), Max: 38 (2019 08:55)  T(F): 96.8 (2019 05:47), Max: 100.4 (2019 08:55)  HR: 91 (2019 05:47) (74 - 98)  BP: 104/70 (2019 05:47) (88/61 - 104/70)  BP(mean): --  RR: 16 (2019 05:47) (16 - 18)  SpO2: 95% (:47) (95% - 100%)     @ 07:01  -  04-02 @ 07:00  --------------------------------------------------------  IN: 2575 mL / OUT: 1750 mL / NET: 825 mL        PHYSICAL EXAM:    General: Well developed; well nourished; in no acute distress  Eyes: Anicteric sclerae, moist conjunctivae  HENT: Moist mucous membranes  Neck: Trachea midline, supple  Lungs: Normal respiratory effors and no intercostal retractions  Cardiovascular: RRR  Abdomen: Soft, non-tender non-distended; Normal bowel sounds; No rebound or guarding; +ostomy, no stool, mucosa pink  Extremities: Normal range of motion, No clubbing, cyanosis or edema  Neurological: Alert and oriented x3  Skin: Warm and dry. No obvious rash    LABS:                        8.8    7.43  )-----------( 238      ( 2019 05:47 )             30.4     04-    141  |  112<H>  |  16  ----------------------------<  133<H>  4.5   |  17<L>  |  0.53    Ca    8.1<L>      2019 05:47  Phos  3.8       Mg     1.6     -    TPro  6.9  /  Alb  3.2<L>  /  TBili  0.7  /  DBili  x   /  AST  850<H>  /  ALT  489<H>  /  AlkPhos  211<H>          PT/INR - ( 2019 00:36 )   PT: 12.8 sec;   INR: 1.13          PTT - ( 2019 00:36 )  PTT:32.3 sec    Culture - Blood (collected 2019 05:59)  Source: .Blood Blood-Peripheral  Preliminary Report (2019 06:00):    No growth at 1 day.    Culture - Blood (collected 2019 05:59)  Source: .Blood Blood-Peripheral  Preliminary Report (2019 07:00):    No growth at 1 day.      RADIOLOGY & ADDITIONAL STUDIES:

## 2019-04-02 NOTE — ADVANCED PRACTICE NURSE CONSULT - ASSESSMENT
Patient presented with established colostomy, per patient since 7/2018. Stoma pale pink, well budded, measuring 1 1/8 inches. No output noted in ostomy pouch. Peristomal skin intact. Patient did not participate in changing pouching system. WOCN applied [place] liquid skin barrier to peristomal skin, then [place] 2 piece 1 3/4 inch 2 piece flat, cut to fit skin barrier and pouch. Provided patient with ostomy supplies.

## 2019-04-02 NOTE — PROGRESS NOTE ADULT - ASSESSMENT
60yo F HD2 readmitted from [facility] with fever, discharged there for apprx 1 week after prolonged hospital stay, with stage IV endometrial adenocarcinoma s/p staging surgery '18 and 1 round of chemotherapy 2/19, previously admitted for management of symptomatic rectovaginal and enterovaginal fistulas 2/22-3/25. Last fever at 0855am 4/1.       1. ID: s/p vancomycin and Zosyn in ED, continue with Zosyn 3.375mg q6hrs for fever; f/u urine and blood cultures (prelim neg), f/u CXray (prelim read shows likely atelectasis)-possible source of infection, obtain another CXray if sx/fever  2. FEN/GI - TPN daily with daily labs- to be ordered in AM; replete electrolytes PRN, octreotide daily in TPN, PICC placed 2/27, lipids every other day- to be ordered today with TPN; clears as tolerated for comfort   - elevated liver enzymes, continue to trend, GI consulted appreciate recs, RUQ sono ordered, will draw full lab panel this AM  3. PAIN - tylenol and motrin prn  4.  - joseph placed given urinary retention 3/16  - no current active leaking of stool  - perianal irritation consistent with possible fungal infection- switched to nystatin cream 3/23; apply to affected area  as needed   - CT abdomen/pelvis w/ IV and oral contrast performed 3/15 showed improved enterovaginal fistula, no longer contrast in vagina, improvement in disease  5. Endocrine- ISS, has not taken home metformin in >1 month, monitor FS; endocrine consulted last hospital stay for workup of possible DI which was negative per team   6. RESP - Saturating well on room air, encouraged ISS, per Radiology no concern for Pneumonia at this time   7. VTE prophylaxis - SCDs, ambulate as tolerated with PT, Lovenox 40qd, PT    8. Neuro- neurology team consulted to evaluate lower extremity weakness persistent, f/u recs, f/u MRI results  9. GYN malignancy  - continue anastrozole for treatment  - consider discharge to [facility] to increase patient's performance status   9. Derm: monitor sacrum for s/s of pressure ulcer. encourage frequent changes in position and OOB during  the day

## 2019-04-02 NOTE — ADVANCED PRACTICE NURSE CONSULT - REASON FOR CONSULT
[facility] nurse consult to assess established ostomy. Patient is a 59 year old female with history of breast cancer, diabetes mellitus, endometrial cancer stage 3, s/p exploratory laparotomy, enterolysis, SHAMIR, BSO, pelvic lymphadenectomy, low anterior resection mobilization of splenic flexure, end colostomy on 7/30/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 with fever.

## 2019-04-02 NOTE — PROGRESS NOTE ADULT - SUBJECTIVE AND OBJECTIVE BOX
GYN PROGRESS NOTE PGY2    Patient seen at bedside.  No overnight events.  Afebrile.  She had the MRI - no results.  States she is hungry/tolerating clears/wants solid food.  Reports no further leakage of stool from below in several days.    T(C): 36 (04-02-19 @ 05:47), Max: 38 (04-01-19 @ 08:55)  HR: 91 (04-02-19 @ 05:47) (74 - 104)  BP: 104/70 (04-02-19 @ 05:47) (88/61 - 129/72)  RR: 16 (04-02-19 @ 05:47) (16 - 18)  SpO2: 95% (04-02-19 @ 05:47) (95% - 100%)    GEN: patient appears well, resting  LUNGS: no respiratory distress  ABD: soft, nt, nd, ostomy w/ no output  EXT: no calf tenderness, no swelling or erythema, SCDs        04-01 @ 07:01  -  04-02 @ 06:05  --------------------------------------------------------  IN: 1485 mL / OUT: 1750 mL / NET: -265 mL      MEDICATIONS  (STANDING):  anastrozole 1 milliGRAM(s) Oral daily  dextrose 5% + sodium chloride 0.45%. 1000 milliLiter(s) (90 mL/Hr) IV Continuous <Continuous>  enoxaparin Injectable 40 milliGRAM(s) SubCutaneous daily  famotidine Injectable 20 milliGRAM(s) IV Push two times a day  nystatin/triamcinolone Cream 1 Application(s) Topical every 12 hours  pantoprazole  Injectable 40 milliGRAM(s) IV Push daily  piperacillin/tazobactam IVPB. 3.375 Gram(s) IV Intermittent every 6 hours    MEDICATIONS  (PRN):

## 2019-04-02 NOTE — CONSULT NOTE ADULT - ASSESSMENT
58 yo F stage IV endometrial adenocarcinoma s/p staging surgery 7/18 and 1 round of chemotherapy 2/19 course c/b with rectovaginal fistula presenting from [facility] with fever up to 100.5. Pt has been on TPN and recently hospitalized, now with bacteremia and persistent anemia.    #Macrocytic Anemia   Multiple etiologies of anemia, likely multifactorial and contributing to lethargy and clinical appearance, causes likely include nutritional and inflammatory. Chronic hx of anemia, previous baselines around hg 8.     -Hx of TPN, albumin <3, macrocytosis - obtain B12 and Folate levels   -Iron panel c/w anemia of chronic inflammation likely 2/2 bacteremia less likely true Iron deficiency- obtain CRP/SED, low retic count points away from acute bleed, given ongoing infection and liver abnormalities would avoid iron supplementation   -no signs of hemolysis (neg bili), no signs of TMAs/thrombocytopenia   -obtain collateral of previous chemo exposure   -consider additional viral/MONIKA markers   -dvt ppx - lovenox (monitor for bleeding)       Discussed w/ Dr. García 58 yo F stage IV endometrial adenocarcinoma s/p staging surgery 7/18 and 1 round of chemotherapy 2/19 course c/b with rectovaginal fistula presenting from [facility] with fever up to 100.5. Pt has been on TPN and recently hospitalized, now with bacteremia and persistent anemia.    #Macrocytic Anemia   Multiple etiologies of anemia, likely multifactorial and contributing to lethargy and clinical appearance, causes likely include nutritional and inflammatory. Chronic hx of anemia, previous baselines around hg 8.     -Hx of TPN, albumin <3, macrocytosis - obtain B12 and Folate levels   -Iron panel c/w anemia of chronic inflammation likely 2/2 bacteremia less likely true Iron deficiency- obtain CRP/SED, low retic count points away from acute bleed, given ongoing infection and liver abnormalities would avoid iron supplementation   -no signs of hemolysis (neg bili), no signs of TMAs/thrombocytopenia   -obtain collateral of previous chemo exposure   -consider additional viral/MONIKA markers   -if above work up neg, will consider primary marrow eval  -dvt ppx - lovenox (monitor for bleeding)       Discussed w/ Dr. García 58 yo F stage IV endometrial adenocarcinoma s/p staging surgery 7/18 and 1 round of chemotherapy 2/19 course c/b with rectovaginal fistula presenting from [facility] with fever up to 100.5. Pt has been on TPN and recently hospitalized, now with bacteremia and persistent anemia.    #Macrocytic Anemia   Multiple etiologies of anemia, likely multifactorial and contributing to lethargy and clinical appearance, causes likely include nutritional and inflammatory. Chronic hx of anemia, previous baselines around hg 8.     -Hx of TPN, albumin <3, macrocytosis - obtain B12 and Folate levels   -Iron panel c/w anemia of chronic inflammation likely 2/2 bacteremia hard to r/o concomitant Iron deficiency anemia- obtain CRP/SED, low retic count points away from acute bleed, given ongoing infection and liver abnormalities would avoid iron supplementation   -no signs of hemolysis (neg bili), no signs of TMAs/thrombocytopenia   -obtain collateral of previous chemo exposure   -consider additional viral/MONIKA markers   -if above work up neg, will consider primary marrow eval  -dvt ppx - lovenox (monitor for bleeding)       Discussed w/ Dr. García

## 2019-04-02 NOTE — PROGRESS NOTE ADULT - ATTENDING COMMENTS
I was physically present for the key portions of the evaluation and managemnent (E/M) service provided.  I agree with the above history, physical, and plan which I have reviewed and edited where appropriate, with the exceptions as per my note.    Pt seen and examined today.     Pt reports weakness of both legs since surgery in 7/2018. Reports worsening with disuse and improvement over past 1 month since she has been doing more with PT.    neuro exam  perrl, mild restriction of gaze in all directions, slight exophthalmos, a few beats of horizontal nystagmus that extinguishes. vff. face symmetric. tup ml  4+/5 diffusely in UEs. 2 in both HFs, 3 in KFs and KEs, and 1/5 in both DFs and PFs.   stocking/glove distribution of VBS loss and decrease to PP.  reflexes absent throughout  coord to FTN intact.  no signs of UMN process.    AP: likely multifactorial. 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 there may be a component of cauda equina compression causing distal foot weakness as there is significant lumbar central canal stenosis but pt is not interested in surgery at this time and is likely not a candidate for it; I suggested a spine surgery consult but pt expressed that she was not interested in this. there is also likely an element of neuropathy that can be related to DM and also to prior chemo use. finally, there is clearly an element of deconditioning. recommend PT. reassuring that pt reports leg strength is improving over past 1 mnoth. outpatient evaluation and EMG with Dr. Bhardwaj.

## 2019-04-02 NOTE — PROGRESS NOTE ADULT - SUBJECTIVE AND OBJECTIVE BOX
GYN PROGRESS NOTE PGY4    Patient evaluated at the bedside. No acute events.  States she couldn't walk much with PT today.  Seen by Neurology - recs to add on TSH, possibly due to polio but unlikely given that weakness worsened in last year, will follow  MRI shows canal stenosis  Pt eating small amounts of regular diet  afebrile on zosyn  blood cx positive  joseph in place, urine cx pending  no further fistula sx    T(C): 36.7 (04-02-19 @ 14:39), Max: 36.7 (04-01-19 @ 16:33)  HR: 96 (04-02-19 @ 14:39) (86 - 98)  BP: 103/68 (04-02-19 @ 14:39) (88/61 - 106/72)  RR: 16 (04-02-19 @ 14:39) (16 - 17)  SpO2: 96% (04-02-19 @ 14:39) (95% - 100%)    GEN: patient appears well sitting in chair  LUNGS: no respiratory distress  ABD: not examined  EXT: no calf tenderness        04-01 @ 07:01  -  04-02 @ 07:00  --------------------------------------------------------  IN: 2575 mL / OUT: 1750 mL / NET: 825 mL    04-02 @ 07:01  -  04-02 @ 15:01  --------------------------------------------------------  IN: 905 mL / OUT: 300 mL / NET: 605 mL              A/P:   1. FEN -   2. PAIN -  3.  -  4. RESP -    5. VTE prophylaxis -  6. LABS -   7. DISPO -

## 2019-04-02 NOTE — CONSULT NOTE ADULT - ASSESSMENT
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 Abnormal LFTS  - LFTs normal during last admission, now elevated in setting of sepsis  -there is a high suspicion for ischemic hepatopathy, as SBP dropped to 80s last night  -recommend Abd Us with dopplers to assess vasculature  -please complete full liver work up including: rule out infectious etiology-Hep A IgG/IgM, Hep B surface antibody/antigen and core antibody, Hep C antibody, EBV, HSV, CMV PCR  -MONIKA, anti smooth muscle, anti LKM, mitochondrial ab, IgG to eval for autoimmune  -lipids, AL1c to eval for CUEVAS, Alpha 1 antitrypsin, ceruloplasmin  -in terms of dili, unlikely to be caused by anastrozole, according to NIH liver tox" Serum enzymes are reported to be elevated in 2% to 4% of women treated with anastrozole, but these elevations are usually mild, asymptomatic and self-limited, rarely requiring dose modification"  -please trend LFTs  -avoid hepatotoxic agents 58 yo F w/ breast cancer, diabetes mellitus, endometrial cancer stage 3, s/p exploratory laparotomy, enterolysis, SHAMIR, BSO, pelvic lymphadenectomy, low anterior resection mobilization of splenic flexure, end colostomy on 7/30/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 with fever, found to have abnormal LFTS    1. Abnormal LFTS  - LFTs normal during last admission, now elevated in setting of sepsis  -there is a high suspicion for ischemic hepatopathy, as SBP dropped to 80s last night  -recommend Abd Us with dopplers to assess vasculature  -please complete full liver work up including: rule out infectious etiology-Hep A IgG/IgM, Hep B surface antibody/antigen and core antibody, Hep C antibody, EBV, HSV, CMV PCR  -MONIKA, anti smooth muscle, anti LKM, mitochondrial ab, IgG to eval for autoimmune  -lipids, AL1c to eval for CUEVAS, Alpha 1 antitrypsin, ceruloplasmin  -in terms of dili, unlikely to be caused by anastrozole, lovenox, or Nystatin. According to NIH liver tox" Nystatin is not absorbed orally and has not been linked to drug induced liver injury., Serum enzymes are reported to be elevated in 2% to 4% of women treated with anastrozole, but these elevations are usually mild, asymptomatic and self-limited, rarely requiring dose modification" The low molecular weight heparins have been associated with serum aminotransferase elevations in 4% to 13% of patients, but values greater than 5 times the upper limit of normal (ULN) are not common and occur mostly among patients receiving the highest doses.  These elevations generally arise within 3 to 7 days of starting therapy and are usually mild, do not cause symptoms, and resolve rapidly once therapy is stopped.  Nystatin is not absorbed orally and has not been linked to drug induced liver injury.  -please trend LFTs  -avoid hepatotoxic agents 60 yo F w/ breast cancer, diabetes mellitus, endometrial cancer stage 3, s/p exploratory laparotomy, enterolysis, SHAMIR, BSO, pelvic lymphadenectomy, low anterior resection mobilization of splenic flexure, end colostomy on 7/30/18 with rectovaginal fistula presenting from [facility] with fever, found to have abnormal LFTS    1. Abnormal LFTS- now downtrending  - LFTs normal during last admission, now elevated in setting of sepsis  -there is a high suspicion for ischemic hepatopathy, as SBP dropped to 80s last night  -recommend Abd Us with dopplers to assess vasculature  -please complete full liver work up including:   -Tylenol level  -rule out infectious etiology-Hep A IgG/IgM, Hep B surface antibody/antigen and core antibody, Hep C antibody, EBV, HSV, CMV PCR  -MONIKA, anti smooth muscle, anti LKM, mitochondrial ab, IgG to eval for autoimmune  -lipids, AL1c to eval for CUEVAS, Alpha 1 antitrypsin, ceruloplasmin  -in terms of dili, unlikely to be caused by anastrozole, lovenox, or Nystatin. According to NIH liver tox" Nystatin is not absorbed orally and has not been linked to drug induced liver injury., Serum enzymes are reported to be elevated in 2% to 4% of women treated with anastrozole, but these elevations are usually mild, asymptomatic and self-limited, rarely requiring dose modification" The low molecular weight heparins have been associated with serum aminotransferase elevations in 4% to 13% of patients, but values greater than 5 times the upper limit of normal (ULN) are not common and occur mostly among patients receiving the highest doses.  These elevations generally arise within 3 to 7 days of starting therapy and are usually mild, do not cause symptoms, and resolve rapidly once therapy is stopped.  Nystatin is not absorbed orally and has not been linked to drug induced liver injury.   -TPN unlikely to cause abn LFTS, as usually it results in cholestatic picture  -please trend LFTs  -avoid hepatotoxic agents

## 2019-04-02 NOTE — PHYSICAL THERAPY INITIAL EVALUATION ADULT - ADDITIONAL COMMENTS
Pt originally lives at home with brother who has hx of CVA with 24/7 support. However, pt otherwise is alone with no stairs to enter home. Pt is coming from [facility] from which she had also come prior to last admission. Pt states she was ambulatory on Friday without assistance and riding a bicycle on Saturday at rehab. Pt has been with use of RW.

## 2019-04-02 NOTE — PROGRESS NOTE ADULT - ASSESSMENT
60yo F HD2 readmitted from [facility] with fever, discharged there for apprx 1 week after prolonged hospital stay, with stage IV endometrial adenocarcinoma s/p staging surgery '18 and 1 round of chemotherapy 2/19, previously admitted for management of symptomatic rectovaginal and enterovaginal fistulas 2/22-3/25. Last fever at 0855am 4/1.       1. ID: s/p vancomycin and Zosyn in ED, continue with Zosyn 3.375mg q6hrs for fever; f/u urine and blood cultures (prelim blood cx positive, awaiting sensitivies), f/u CXray (prelim read shows likely atelectasis)-possible source of infection, obtain another CXray if sx/fever  2. FEN/GI - TPN daily with daily labs- to be ordered in AM; replete electrolytes PRN, octreotide daily in TPN, PICC placed 2/27, lipids every other day- to be ordered today with TPN; clears as tolerated for comfort   - elevated liver enzymes, continue to trend, GI consulted appreciate recs, RUQ sono ordered, will draw full lab panel this AM  3. PAIN - tylenol and motrin prn, no pain  4.  - joseph placed given urinary retention 3/16  - no current active leaking of stool  - perianal irritation consistent with possible fungal infection- switched to nystatin cream 3/23; apply to affected area  as needed   - CT abdomen/pelvis w/ IV and oral contrast performed 3/15 showed improved enterovaginal fistula, no longer contrast in vagina, improvement in disease  5. Endocrine- ISS, has not taken home metformin in >1 month, monitor FS; endocrine consulted last hospital stay for workup of possible DI which was negative per team   6. RESP - Saturating well on room air, encouraged ISS, per Radiology no concern for Pneumonia at this time   7. VTE prophylaxis - SCDs, ambulate as tolerated with PT, Lovenox 40qd, PT    8. Neuro- neurology team consulted to evaluate lower extremity weakness persistent, f/u recs, will add on TSH   9. GYN malignancy  - continue anastrozole for treatment  - consider discharge to [facility] to increase patient's performance status   9. Derm: monitor sacrum for s/s of pressure ulcer. encourage frequent changes in position and OOB during  the day

## 2019-04-02 NOTE — CONSULT NOTE ADULT - SUBJECTIVE AND OBJECTIVE BOX
Hematology Oncology Consult Note (Dr. Gacría )    58 yo F with history significant for h/o breast cancer in  s/p chemo and radiation with left breast lumpectomy, stage IV endometrial adenocarcinoma s/p staging surgery ' and 1 round of chemotherapy  including exploratory laparotomy, enterolysis, SHAMIR, BSO, pelvic lymphadenectomy, low anterior resection mobilization of splenic flexure, end colostomy on 18 with rectovaginal fistula presenting from [facility] with fever.  She reports having myalgias starting on 3/31 and was found to be febrile to 100.5 at the [facility] facility so was sent to [facility] for further evaluation.  She denies headache, cough, SOB, chest pain, abdominal pain, vomiting, leakage of stool from fistula.  Yesterday she felt nauseous and spit up saliva but no episodes of emesis.  She has been on TPN and clears, has joseph in place. She was previously admitted -3/25 for management of rectovaginal fistula.      PAST MEDICAL & SURGICAL HISTORY:      Allergies: NDKA       Medications:  enoxaparin Injectable 40 milliGRAM(s) SubCutaneous daily  anastrozole 1 milliGRAM(s) Oral daily  MEDICATIONS  (STANDING):  anastrozole 1 milliGRAM(s) Oral daily  dextrose 5% + sodium chloride 0.45%. 1000 milliLiter(s) (90 mL/Hr) IV Continuous <Continuous>  enoxaparin Injectable 40 milliGRAM(s) SubCutaneous daily  famotidine Injectable 20 milliGRAM(s) IV Push two times a day  nystatin/triamcinolone Cream 1 Application(s) Topical every 12 hours  pantoprazole  Injectable 40 milliGRAM(s) IV Push daily  Parenteral Nutrition - Adult 1 Each TPN Continuous <Continuous>  piperacillin/tazobactam IVPB. 3.375 Gram(s) IV Intermittent every 6 hours  potassium acid phosphate/sodium acid phosphate tablet (K-PHOS No. 2) 1 Tablet(s) Oral four times a day with meals    PHYSICAL EXAM:    T(F): 98.2 (19 @ 16:45), Max: 98.2 (19 @ 16:45)  HR: 99 (19 @ 16:45) (91 - 99)  BP: 111/79 (19 @ 16:45) (88/61 - 111/79)  RR: 17 (19 @ 16:45) (16 - 17)  SpO2: 95% (19 @ 16:45) (95% - 96%)  Wt(kg): --    Daily     Daily     GEN: Pale, sitting in chair  HEENT:AT/NC   CVS:+S1, S2, RRR   LUNG: CTA b/L   GI: +BS, no ostomy outpt   EXT; no c/c/e, picc line, no local infx  NEURO: aaox3            Labs:                          7.4    7.22  )-----------( 211      ( 2019 09:00 )             25.0     CBC Full  -  ( 2019 09:00 )  WBC Count : 7.22 K/uL  RBC Count : 2.46 M/uL  Hemoglobin : 7.4 g/dL  Hematocrit : 25.0 %  Platelet Count - Automated : 211 K/uL  Mean Cell Volume : 101.6 fl  Mean Cell Hemoglobin : 30.1 pg  Mean Cell Hemoglobin Concentration : 29.6 gm/dL  Auto Neutrophil # : 5.87 K/uL  Auto Lymphocyte # : 0.81 K/uL  Auto Monocyte # : 0.46 K/uL  Auto Eosinophil # : 0.01 K/uL  Auto Basophil # : 0.01 K/uL  Auto Neutrophil % : 81.4 %  Auto Lymphocyte % : 11.2 %  Auto Monocyte % : 6.4 %  Auto Eosinophil % : 0.1 %  Auto Basophil % : 0.1 %    PT/INR - ( 2019 00:36 )   PT: 12.8 sec;   INR: 1.13          PTT - ( 2019 00:36 )  PTT:32.3 sec        138  |  108  |  12  ----------------------------<  160<H>  3.6   |  21<L>  |  0.57    Ca    8.6      2019 08:59  Phos  2.9       Mg     1.4         TPro  5.9<L>  /  Alb  2.8<L>  /  TBili  0.4  /  DBili  <0.2  /  AST  300<H>  /  ALT  375<H>  /  AlkPhos  344<H>        Urinalysis Basic - ( 2019 02:16 )    Color: Yellow / Appearance: Clear / S.015 / pH: x  Gluc: x / Ketone: NEGATIVE  / Bili: Negative / Urobili: 0.2 E.U./dL   Blood: x / Protein: Trace mg/dL / Nitrite: POSITIVE   Leuk Esterase: Small / RBC: < 5 /HPF / WBC 5-10 /HPF   Sq Epi: x / Non Sq Epi: 0-5 /HPF / Bacteria: Present /HPF     MR Lumbar      1. No MR evidence of epidural abscess.  2. Diffuse central canal stenosis throughout the lumbar spine, greatest   at L4-L5 level, due to both acquired etiologies from facet arthropathy,   disc disease, and epidural lipomatosis as well as a congenital etiology   from short pedicle morphology.  3. Right foraminal-lateral disc protrusion on background of diffuse bulge   with greater right-sided foraminal narrowing and lateral recess stenosis   which may be affecting the traversing right L5 and/or foraminal right L4   nerve roots.  4. Diffuse disc bulges L2-L3, L4, L5-S1 levels.  5. Multilevel foraminal narrowing, as above.  6. Lumbar kyphosis, apex at L2-L3 level    Blood cxs +

## 2019-04-03 ENCOUNTER — APPOINTMENT (OUTPATIENT)
Dept: GYNECOLOGIC ONCOLOGY | Facility: CLINIC | Age: 59
End: 2019-04-03
Payer: MEDICARE

## 2019-04-03 LAB
ALBUMIN SERPL ELPH-MCNC: 2.8 G/DL — LOW (ref 3.3–5)
ALP SERPL-CCNC: 310 U/L — HIGH (ref 40–120)
ALT FLD-CCNC: 312 U/L — HIGH (ref 10–45)
ANA TITR SER: NEGATIVE — SIGNIFICANT CHANGE UP
ANION GAP SERPL CALC-SCNC: 7 MMOL/L — SIGNIFICANT CHANGE UP (ref 5–17)
APAP SERPL-MCNC: <5 UG/ML — LOW (ref 10–30)
AST SERPL-CCNC: 153 U/L — HIGH (ref 10–40)
BASOPHILS # BLD AUTO: 0.02 K/UL — SIGNIFICANT CHANGE UP (ref 0–0.2)
BASOPHILS NFR BLD AUTO: 0.2 % — SIGNIFICANT CHANGE UP (ref 0–2)
BILIRUB SERPL-MCNC: 0.2 MG/DL — SIGNIFICANT CHANGE UP (ref 0.2–1.2)
BUN SERPL-MCNC: 13 MG/DL — SIGNIFICANT CHANGE UP (ref 7–23)
CALCIUM SERPL-MCNC: 8.3 MG/DL — LOW (ref 8.4–10.5)
CHLORIDE SERPL-SCNC: 105 MMOL/L — SIGNIFICANT CHANGE UP (ref 96–108)
CMV DNA CSF QL NAA+PROBE: SIGNIFICANT CHANGE UP
CMV DNA SPEC NAA+PROBE-LOG#: SIGNIFICANT CHANGE UP LOGIU/ML
CO2 SERPL-SCNC: 23 MMOL/L — SIGNIFICANT CHANGE UP (ref 22–31)
CREAT SERPL-MCNC: 0.57 MG/DL — SIGNIFICANT CHANGE UP (ref 0.5–1.3)
CRP SERPL-MCNC: 10.39 MG/DL — HIGH (ref 0–0.4)
EOSINOPHIL # BLD AUTO: 0.01 K/UL — SIGNIFICANT CHANGE UP (ref 0–0.5)
EOSINOPHIL NFR BLD AUTO: 0.1 % — SIGNIFICANT CHANGE UP (ref 0–6)
FOLATE SERPL-MCNC: >20 NG/ML — SIGNIFICANT CHANGE UP
GLUCOSE SERPL-MCNC: 289 MG/DL — HIGH (ref 70–99)
HCT VFR BLD CALC: 23.9 % — LOW (ref 34.5–45)
HGB BLD-MCNC: 7.2 G/DL — LOW (ref 11.5–15.5)
IGG FLD-MCNC: 817 MG/DL — SIGNIFICANT CHANGE UP (ref 610–1660)
IMM GRANULOCYTES NFR BLD AUTO: 0.6 % — SIGNIFICANT CHANGE UP (ref 0–1.5)
LYMPHOCYTES # BLD AUTO: 1.01 K/UL — SIGNIFICANT CHANGE UP (ref 1–3.3)
LYMPHOCYTES # BLD AUTO: 10.6 % — LOW (ref 13–44)
MAGNESIUM SERPL-MCNC: 2.2 MG/DL — SIGNIFICANT CHANGE UP (ref 1.6–2.6)
MCHC RBC-ENTMCNC: 30.1 GM/DL — LOW (ref 32–36)
MCHC RBC-ENTMCNC: 30.1 PG — SIGNIFICANT CHANGE UP (ref 27–34)
MCV RBC AUTO: 100 FL — SIGNIFICANT CHANGE UP (ref 80–100)
MITOCHONDRIA AB SER-ACNC: SIGNIFICANT CHANGE UP
MONOCYTES # BLD AUTO: 0.62 K/UL — SIGNIFICANT CHANGE UP (ref 0–0.9)
MONOCYTES NFR BLD AUTO: 6.5 % — SIGNIFICANT CHANGE UP (ref 2–14)
NEUTROPHILS # BLD AUTO: 7.78 K/UL — HIGH (ref 1.8–7.4)
NEUTROPHILS NFR BLD AUTO: 82 % — HIGH (ref 43–77)
NRBC # BLD: 0 /100 WBCS — SIGNIFICANT CHANGE UP (ref 0–0)
PHOSPHATE SERPL-MCNC: 2.7 MG/DL — SIGNIFICANT CHANGE UP (ref 2.5–4.5)
PLATELET # BLD AUTO: 217 K/UL — SIGNIFICANT CHANGE UP (ref 150–400)
POTASSIUM SERPL-MCNC: 3.5 MMOL/L — SIGNIFICANT CHANGE UP (ref 3.5–5.3)
POTASSIUM SERPL-SCNC: 3.5 MMOL/L — SIGNIFICANT CHANGE UP (ref 3.5–5.3)
PROT SERPL-MCNC: 5.9 G/DL — LOW (ref 6–8.3)
RBC # BLD: 2.39 M/UL — LOW (ref 3.8–5.2)
RBC # FLD: 15.7 % — HIGH (ref 10.3–14.5)
SMOOTH MUSCLE AB SER-ACNC: ABNORMAL
SODIUM SERPL-SCNC: 135 MMOL/L — SIGNIFICANT CHANGE UP (ref 135–145)
VIT B12 SERPL-MCNC: 1025 PG/ML — SIGNIFICANT CHANGE UP (ref 232–1245)
WBC # BLD: 9.5 K/UL — SIGNIFICANT CHANGE UP (ref 3.8–10.5)
WBC # FLD AUTO: 9.5 K/UL — SIGNIFICANT CHANGE UP (ref 3.8–10.5)

## 2019-04-03 PROCEDURE — 99222 1ST HOSP IP/OBS MODERATE 55: CPT

## 2019-04-03 PROCEDURE — 99231 SBSQ HOSP IP/OBS SF/LOW 25: CPT

## 2019-04-03 PROCEDURE — 99232 SBSQ HOSP IP/OBS MODERATE 35: CPT

## 2019-04-03 PROCEDURE — 71045 X-RAY EXAM CHEST 1 VIEW: CPT | Mod: 26

## 2019-04-03 PROCEDURE — 93010 ELECTROCARDIOGRAM REPORT: CPT

## 2019-04-03 RX ORDER — FUROSEMIDE 40 MG
20 TABLET ORAL ONCE
Qty: 0 | Refills: 0 | Status: COMPLETED | OUTPATIENT
Start: 2019-04-03 | End: 2019-04-03

## 2019-04-03 RX ORDER — MEROPENEM 1 G/30ML
1000 INJECTION INTRAVENOUS EVERY 8 HOURS
Qty: 0 | Refills: 0 | Status: DISCONTINUED | OUTPATIENT
Start: 2019-04-03 | End: 2019-04-04

## 2019-04-03 RX ORDER — MEROPENEM 1 G/30ML
INJECTION INTRAVENOUS
Qty: 0 | Refills: 0 | Status: DISCONTINUED | OUTPATIENT
Start: 2019-04-03 | End: 2019-04-04

## 2019-04-03 RX ORDER — IBUPROFEN 200 MG
600 TABLET ORAL EVERY 6 HOURS
Qty: 0 | Refills: 0 | Status: DISCONTINUED | OUTPATIENT
Start: 2019-04-03 | End: 2019-04-09

## 2019-04-03 RX ORDER — FUROSEMIDE 40 MG
10 TABLET ORAL ONCE
Qty: 0 | Refills: 0 | Status: COMPLETED | OUTPATIENT
Start: 2019-04-03 | End: 2019-04-03

## 2019-04-03 RX ORDER — I.V. FAT EMULSION 20 G/100ML
50 EMULSION INTRAVENOUS ONCE
Qty: 0 | Refills: 0 | Status: COMPLETED | OUTPATIENT
Start: 2019-04-03 | End: 2019-04-03

## 2019-04-03 RX ORDER — ELECTROLYTE SOLUTION,INJ
1 VIAL (ML) INTRAVENOUS
Qty: 0 | Refills: 0 | Status: DISCONTINUED | OUTPATIENT
Start: 2019-04-03 | End: 2019-04-03

## 2019-04-03 RX ORDER — MEROPENEM 1 G/30ML
1000 INJECTION INTRAVENOUS ONCE
Qty: 0 | Refills: 0 | Status: COMPLETED | OUTPATIENT
Start: 2019-04-03 | End: 2019-04-03

## 2019-04-03 RX ADMIN — ANASTROZOLE 1 MILLIGRAM(S): 1 TABLET ORAL at 13:15

## 2019-04-03 RX ADMIN — ENOXAPARIN SODIUM 40 MILLIGRAM(S): 100 INJECTION SUBCUTANEOUS at 13:15

## 2019-04-03 RX ADMIN — Medication 20 MILLIGRAM(S): at 10:40

## 2019-04-03 RX ADMIN — Medication 1 APPLICATION(S): at 22:07

## 2019-04-03 RX ADMIN — MEROPENEM 100 MILLIGRAM(S): 1 INJECTION INTRAVENOUS at 18:19

## 2019-04-03 RX ADMIN — Medication 1 APPLICATION(S): at 10:00

## 2019-04-03 RX ADMIN — PIPERACILLIN AND TAZOBACTAM 200 GRAM(S): 4; .5 INJECTION, POWDER, LYOPHILIZED, FOR SOLUTION INTRAVENOUS at 10:41

## 2019-04-03 RX ADMIN — I.V. FAT EMULSION 20.83 GRAM(S): 20 EMULSION INTRAVENOUS at 22:07

## 2019-04-03 RX ADMIN — PIPERACILLIN AND TAZOBACTAM 200 GRAM(S): 4; .5 INJECTION, POWDER, LYOPHILIZED, FOR SOLUTION INTRAVENOUS at 05:11

## 2019-04-03 RX ADMIN — FAMOTIDINE 20 MILLIGRAM(S): 10 INJECTION INTRAVENOUS at 05:11

## 2019-04-03 RX ADMIN — FAMOTIDINE 20 MILLIGRAM(S): 10 INJECTION INTRAVENOUS at 18:19

## 2019-04-03 RX ADMIN — PANTOPRAZOLE SODIUM 40 MILLIGRAM(S): 20 TABLET, DELAYED RELEASE ORAL at 13:15

## 2019-04-03 RX ADMIN — Medication 10 MILLIGRAM(S): at 23:43

## 2019-04-03 RX ADMIN — Medication 1 EACH: at 18:19

## 2019-04-03 RX ADMIN — Medication 1 TABLET(S): at 07:08

## 2019-04-03 NOTE — CONSULT NOTE ADULT - SUBJECTIVE AND OBJECTIVE BOX
HPI:  59 year old female with PMH significant for breast cancer, diabetes mellitus, endometrial cancer stage 3, s/p exploratory laparotomy, enterolysis, SHAMIR, BSO, pelvic lymphadenectomy, low anterior resection mobilization of splenic flexure, end colostomy on 7/30/18 with rectovaginal fistula presenting from [facility] with fever.  She reports having myalgias starting on 3/31 and was found to be febrile to 100.5 at the [facility] facility so was sent to [facility] for further evaluation.  She denies headache, cough, SOB, chest pain, abdominal pain, vomiting, leakage of stool from fistula.  Yesterday she felt nauseous and spit up saliva but no episodes of emesis.  She has been on TPN and clears, has joseph in place.      She was previously admitted 2/22-3/25 for management of rectovaginal fistula.  She was initially kept NPO and was given TPN and octreotide daily as conservative approach to treat the fistula whilst providing nutrition. Nutrition team followed Pt daily and made recommendations for TPN and all adjustments necessary. On admission, Pt also found to have a perianal fungal infection most likely caused by stool leakage from vagina. Antifungal was applied at the region daily. Stool output from vagina significantly decreased during hospital stay. Also, urology was consulted for overflow incontinence so joseph catheter was placed. Urine output was noticed to be increasingly high so endocrinologist was consulted to rule out diabetes insipidus, which Pt was found not to have after DI workup. Urine culture was positive for ESBL e-coli and MRSA so Pt was put in isolation and received antibiotics.  Urine culture was repeated 72hrs after antibiotics was stopped per infectious disease recommendations and that came back as negative so isolation was not necessary anymore. Patient was discharged to [facility], and was discharged on 3/25 on TPN, anastrazole daily and anticoagulation treatment.    Patient had a fever on admission of 100.4.  Blood cultures were drawn and are now growing P. mirabeles and one bottle has coag negative staph. Patient was started on zosyn.                OB/GYN Hx: G0, endometrial cancer dx in 7/2018; h/o breast cancer in 2009 s/p chemo and radiation with left breast lumpectomy  PMHx: T2DM, polio in childhood, h/o breast cancer  SHx: left breast lumpectomy, right knee surgery with screw placement 2001, 7/2018 exploratory laparotomy, enterolysis, SHAMIR, BSO, pelvic lymphadenectomy, low anterior resection mobilization of splenic flexure, end colostomy   Meds: metformin [id number] BID, nexium  Allergies: NKDA            REVIEW OF SYSTEMS:    General:	 no weakness; no fevers, no chills  Skin/Breast: no rash  Respiratory and Thorax: no SOB, no cough  Cardiovascular:	No chest pain  Gastrointestinal:	 no nausea, vomiting , diarrhea  Genitourinary:	no dysuria, no difficulty urinating, no hematuria  Musculoskeletal:	no weakness, no joint swelling/pain  Neurological:	no focal weakness/numbness  Endocrine:	no polyuria, no polydipsia      ANTIBIOTICS:  MEDICATIONS  (STANDING):  anastrozole 1 milliGRAM(s) Oral daily  enoxaparin Injectable 40 milliGRAM(s) SubCutaneous daily  famotidine Injectable 20 milliGRAM(s) IV Push two times a day  fat emulsion (Plant Based) 20% IVPB 50 Gram(s) IV Intermittent once  meropenem  IVPB 1000 milliGRAM(s) IV Intermittent once  meropenem  IVPB 1000 milliGRAM(s) IV Intermittent every 8 hours  meropenem  IVPB      nystatin/triamcinolone Cream 1 Application(s) Topical every 12 hours  pantoprazole  Injectable 40 milliGRAM(s) IV Push daily  Parenteral Nutrition - Adult 1 Each TPN Continuous <Continuous>  Parenteral Nutrition - Adult 1 Each TPN Continuous <Continuous>    MEDICATIONS  (PRN):      Allergies    No Known Allergies    Intolerances        SOCIAL HISTORY:    FAMILY HISTORY:      Vital Signs Last 24 Hrs  T(C): 36.1 (03 Apr 2019 13:53), Max: 37 (02 Apr 2019 21:00)  T(F): 96.9 (03 Apr 2019 13:53), Max: 98.6 (02 Apr 2019 21:00)  HR: 90 (03 Apr 2019 13:53) (90 - 116)  BP: 94/65 (03 Apr 2019 13:53) (94/65 - 143/87)  BP(mean): --  RR: 15 (03 Apr 2019 13:53) (15 - 18)  SpO2: 95% (03 Apr 2019 13:53) (95% - 100%)    PHYSICAL EXAM:  Constitutional: non-toxic  Eyes:THOMAS, EOMI  Ear/Nose/Throat: no oral lesion, no sinus tenderness on percussion	  Neck:no JVD, no lymphadenopathy, supple  Respiratory: CTA heath  Cardiovascular: S1S2 RRR, no murmurs  Gastrointestinal: distended, ostomy in place  Extremities: + edema  Vascular: DP Pulse:	right normal; left normal            LABS:                        7.2    9.50  )-----------( 217      ( 03 Apr 2019 02:09 )             23.9     04-03    135  |  105  |  13  ----------------------------<  289<H>  3.5   |  23  |  0.57    Ca    8.3<L>      03 Apr 2019 02:08  Phos  2.7     04-03  Mg     2.2     04-03    TPro  5.9<L>  /  Alb  2.8<L>  /  TBili  0.2  /  DBili  x   /  AST  153<H>  /  ALT  312<H>  /  AlkPhos  310<H>  04-03          MICROBIOLOGY:    Culture - Urine (04.01.19 @ 08:21)    Specimen Source: .Urine Clean Catch (Midstream)    Culture Results:   50,000 CFU/ml Proteus mirabilis Susceptibility to follow.  10,000 CFU/ml Enterococcus faecalis Susceptibility to follow.    Culture - Blood (04.01.19 @ 05:59)    Gram Stain:   Aerobic and Anaerobic Bottle: Gram Negative Rods  Result called to and read back by_ Ms. SARA Comer RN  04/02/2019 09:36:43  ***Blood Panel PCR results on this specimen are available  approximately 3 hours after the Gram stain result.***  Gram stain, PCR, and/or culture results may not always  correspond due to difference in methodologies.  ************************************************************  This PCR assay was performed using [employer].  The following targets are tested for: Enterococcus,  vancomycin resistant enterococci, Listeria monocytogenes,  coagulase negative staphylococci, S. aureus,  methicillin resistant S. aureus, Streptococcus agalactiae  (Group B), S. pneumoniae, S. pyogenes (Group A),  Acinetobacter baumannii, Enterobacter cloacae, E. coli,  Klebsiella oxytoca, K. pneumoniae, Proteus sp.,  Serratia marcescens, Haemophilus influenzae,  Neisseria meningitidis, Pseudomonas aeruginosa, Candida  albicans, C. glabrata, C krusei, C parapsilosis,  C. tropicalis and the KPC resistance gene.  "Due to technical problems, Proteus sp. will Not be reported as part of  the BCID panel until further notice"    -  Coagulase negative Staphylococcus: Nondet    -  Enterococcus species: Nondet    -  Vancomycin resistant Enterococcus sp.: Nondet    -  Escherichia coli: Nondet    -  Klebsiella oxytoca: Nondet    -  Klebsiella pneumoniae: Nondet    -  Serratia marcescens: Nondet    -  Haemophilus influenzae: Nondet    -  Listeria monocytogenes: Nondet    -  Neisseria meningitidis: Nondet    -  Pseudomonas aeruginosa: Nondet    -  Acinetobacter baumanii: Nondet    -  Enterobacter cloacae complex: Nondet    -  Streptococcus sp. (Not Grp A, B or S pneumoniae): Nondet    -  Streptococcus agalactiae (Group B): Nondet    -  Streptococcus pyogenes (Group A): Nondet    -  Streptococcus pneumoniae: Nondet    -  Candida albicans: Nondet    -  Candida glabrata: Nondet    -  Candida krusei: Nondet    -  Candida parapsilosis: Nondet    -  Candida tropicalis: Nondet    -  Multidrug (KPC pos) resistant organism: Nondet    -  Staphylococcus aureus: Nondet    -  Methicillin resistant Staphylococcus aureus (MRSA): Nondet    Specimen Source: .Blood Blood-Peripheral    Organism: Blood Culture PCR    Culture Results:   Growth in aerobic and anaerobic bottles: Proteus species  Identification and susceptibility to follow.    Organism Identification: Blood Culture PCR    Method Type: PCR      Culture - Blood (04.01.19 @ 05:59)    -  Coagulase negative Staphylococcus: Detec    Gram Stain:   Aerobic Bottle: Gram Negative Rods  Aerobic Bottle: Gram Positive Cocci in Clusters  Result called to and read back byJavier Comer RN  04/02/2019 09:36:43  ***Blood Panel PCR results on this specimen are available  approximately 3 hours after the Gram stain result.***  Gram stain, PCR, and/or culture results may not always  correspond due to difference in methodologies.  ************************************************************  This PCR assay was performed using [employer].  The following targets are tested for: Enterococcus,  vancomycin resistant enterococci, Listeria monocytogenes,  coagulase negative staphylococci, S. aureus,  methicillin resistant S. aureus, Streptococcus agalactiae  (Group B), S. pneumoniae, S. pyogenes (Group A),  Acinetobacter baumannii, Enterobacter cloacae, E. coli,  Klebsiella oxytoca, K. pneumoniae, Proteus sp.,  Serratia marcescens, Haemophilus influenzae,  Neisseria meningitidis, Pseudomonas aeruginosa, Candida  albicans, C. glabrata, C krusei, C parapsilosis,  C. tropicalis and the KPC resistance gene.  "Due to technical problems, Proteus sp. will Not be reported as part of  the BCID panel until further notice"    Specimen Source: .Blood Blood-Peripheral    Organism: Blood Culture PCR    Culture Results:   Growth in aerobic bottle: Proteus species  Growth in aerobic bottle: Coag Negative Staphylococcus  Identification and susceptibility to follow.    Organism Identification: Blood Culture PCR    Method Type: PCR          RADIOLOGY & ADDITIONAL STUDIES:    < from: CT Abdomen and Pelvis w/ Oral Cont and w/ IV Cont (03.15.19 @ 17:22) >   Since 2/18/2019, there has been improvement in enterovaginal fistula.   There is no longer contrast in the vagina. Small probable sinus tract   remains left pelvis.    2.  There is still mild hydronephrosis bilaterally.    3.  New trace right pleural effusion.      < end of copied text >

## 2019-04-03 NOTE — PROGRESS NOTE ADULT - ATTENDING COMMENTS
I was physically present for the key portions of the evaluation and managemnent (E/M) service provided.  I agree with the above history, physical, and plan which I have reviewed and edited where appropriate, with the exceptions as per my note.    pt seen and examined on 4/3/19, date of service 4/3/19.    neuro exam is unchanged.    leg weakness is likely multifactorial. 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 there may be a component of cauda equina compression causing distal foot weakness as there is significant lumbar central canal stenosis but pt is not interested in surgery at this time and is likely not a candidate for it; I suggested a spine surgery consult but pt expressed that she was not interested in this. there is also likely an element of neuropathy that can be related to DM and also to prior chemo use. finally, there is clearly an element of deconditioning. recommend PT. reassuring that pt reports leg strength is improving over past 1 mnoth. outpatient evaluation and EMG with Dr. Bhardwaj.  also, please refer pt for evaluation with ortho-spine Dr. Rapp upon discharge.

## 2019-04-03 NOTE — PROGRESS NOTE ADULT - ASSESSMENT
60yo F with stage IV endometrial adenocarcinoma s/p staging surgery '18 and 1 round of chemotherapy 2/19, previously admitted for management of symptomatic rectovaginal and enterovaginal fistulas 2/22-3/25, now presenting from [facility] with fever.   Neurology consulted in setting of worsening LE weakness .     Assessment / Plan Pt with  hx of polio at age 6. Since last July when she was discharged to [facility] she reports worsening weakness b/l LE extremities with need for walker. Neurology consulted for b/l weakness    -Lumbar spine MRI showing  Diffuse central canal stenosis throughout the lumbar spine, greatest at L4-L5 level, due to both acquired etiologies from facet arthropathy, disc disease, and epidural lipomatosis as well as a congenital etiology from short pedicle morphology. Right foraminal-lateral disc protrusion on background of diffuse bulge with greater right-sided foraminal narrowing and lateral recess stenosis   which may be affecting the traversing right L5 and/or foraminal right L4 nerve roots. Diffuse disc bulges L2-L3, L4, L5-S1 levels. Multilevel foraminal narrowing, as above. Lumbar kyphosis, apex at L2-L3 level    -On exam motor strength showing knee flexion extension 3/5.  Ankle strength 1/5,thigh flexion extension 2/5 Pt unable to wiggle toes or plantar /dorsi flex feet. Sensation in intact with downgoing toes.   -Etiology of weakness possibly related to  polio syndrome given hx of polio at age 6. Element of deconditioning present however her weakness cannot be entirely attributed to that. spinal mets was also in differential but no e/o this on lumbar MRI.   -TSH obtained was WNL   -Will consider EMG studies however do not to be done inpatient  -neurology will continue to follow 58yo F with stage IV endometrial adenocarcinoma s/p staging surgery '18 and 1 round of chemotherapy 2/19, previously admitted for management of symptomatic rectovaginal and enterovaginal fistulas 2/22-3/25, now presenting from [facility] with fever.   Neurology consulted in setting of worsening LE weakness .     Assessment / Plan Pt with  hx of polio at age 6. Since last July when she was discharged to [facility] she reports worsening weakness b/l LE extremities with need for walker. Neurology consulted for b/l weakness  -Etiology of weakness multifactorial . It can be related to polio syndrome given hx of polio at age 6.  Element of deconditioning present however her weakness cannot be entirely attributed to that. Spinal mets was also in differential but no e/o this on lumbar MRI.  - There may be a component of cauda equina compression causing distal foot weakness as there is significant lumbar central canal stenosis seen on imagine. Can consult ortho spine Dr Rapp when patient clears acute infection( No need for acute consult with spine surgery given chronicity of LE weakness however it is reasonable once acute infection clears ) .  - There is also likely an element of neuropathy that can be related to DM and also to prior chemo use.   -On exam motor strength showing knee flexion extension 3/5.  Ankle strength 1/5,thigh flexion extension 2/5 Pt unable to wiggle toes or plantar /dorsi flex feet. Sensation in intact with downgoing toes.   - recommend PT.   - outpatient evaluation and EMG with Dr. Bhardwaj.    -TSH obtained was WNL   -neurology will sign off for now.

## 2019-04-03 NOTE — PROGRESS NOTE ADULT - SUBJECTIVE AND OBJECTIVE BOX
GYN PROGRESS NOTE PGY4    Patient evaluated at the bedside.   seen by multiple services today:  GI - will continue to follow, trend liver enzymes, no further recs at this time, JOHN irizarry WNL  heme - macrocytic anemia, likely of chronic disease, would recommend against IV Iron, will continue to follow, no further recs at this time  neuro - extreme weakness especially in lower extremities, likely from having polio, not unusual to have it re-present at this age, possible she is not just deconditioned and will never regain full strength, this was explained to the patient. also recommend ortho-spine see the patient for cadal narrowing, pt could potentially benefit from surgery if she is stable/amenable. outpatient nerve studies recommended. neuro signed off.  pulm - issues resolved  ID - reviewed cx results, recommended repeat blood cx which were taken from PICC tonight, recommend remove and replace joseph, d/c zosyn, replace with meropenem, contact precautions.    pt feeling better. off supplemental oxygen. sitting in chair, eating.     T(C): 36.1 (04-03-19 @ 13:53), Max: 37 (04-02-19 @ 21:00)  HR: 90 (04-03-19 @ 13:53) (90 - 116)  BP: 94/65 (04-03-19 @ 13:53) (94/65 - 143/87)  RR: 15 (04-03-19 @ 13:53) (15 - 18)  SpO2: 95% (04-03-19 @ 13:53) (95% - 100%)    GEN: patient appears well  LUNGS: no respiratory distress at this time, lungs are cta b/l  ABD: soft, non tender, pt thinks she is bloated but does not appear distended on physical exam, some output in ostomy.  EXT: no calf tenderness, LE weakness        04-02 @ 07:01  -  04-03 @ 07:00  --------------------------------------------------------  IN: 1530 mL / OUT: 1600 mL / NET: -70 mL    04-03 @ 07:01  -  04-03 @ 17:41  --------------------------------------------------------  IN: 490 mL / OUT: 1350 mL / NET: -860 mL        MEDICATIONS  (STANDING):  anastrozole 1 milliGRAM(s) Oral daily  enoxaparin Injectable 40 milliGRAM(s) SubCutaneous daily  famotidine Injectable 20 milliGRAM(s) IV Push two times a day  fat emulsion (Plant Based) 20% IVPB 50 Gram(s) IV Intermittent once  meropenem  IVPB 1000 milliGRAM(s) IV Intermittent once  meropenem  IVPB 1000 milliGRAM(s) IV Intermittent every 8 hours  meropenem  IVPB      nystatin/triamcinolone Cream 1 Application(s) Topical every 12 hours  pantoprazole  Injectable 40 milliGRAM(s) IV Push daily  Parenteral Nutrition - Adult 1 Each TPN Continuous <Continuous>  Parenteral Nutrition - Adult 1 Each TPN Continuous <Continuous>    MEDICATIONS  (PRN):

## 2019-04-03 NOTE — PROGRESS NOTE ADULT - SUBJECTIVE AND OBJECTIVE BOX
Pt seen and examined at bedside. Pt has no complaints. Denies abd pain/nausea/vomiting    PERTINENT REVIEW OF SYSTEMS:  CONSTITUTIONAL: No weakness, fevers or chills  HEENT: No visual changes; No vertigo or throat pain   GASTROINTESTINAL: No abdominal or epigastric pain. No nausea, vomiting, or hematemesis; No diarrhea or constipation. No melena or hematochezia.  NEUROLOGICAL: No numbness or weakness  SKIN: No itching, burning, rashes, or lesions     Allergies    No Known Allergies    Intolerances      MEDICATIONS:  MEDICATIONS  (STANDING):  anastrozole 1 milliGRAM(s) Oral daily  enoxaparin Injectable 40 milliGRAM(s) SubCutaneous daily  famotidine Injectable 20 milliGRAM(s) IV Push two times a day  nystatin/triamcinolone Cream 1 Application(s) Topical every 12 hours  pantoprazole  Injectable 40 milliGRAM(s) IV Push daily  Parenteral Nutrition - Adult 1 Each TPN Continuous <Continuous>  piperacillin/tazobactam IVPB. 3.375 Gram(s) IV Intermittent every 6 hours    MEDICATIONS  (PRN):  ibuprofen  Tablet. 600 milliGRAM(s) Oral four times a day PRN Temp greater or equal to 38C (100.4F), Moderate Pain (4 - 6)    Vital Signs Last 24 Hrs  T(C): 36.2 (03 Apr 2019 05:24), Max: 37 (02 Apr 2019 21:00)  T(F): 97.1 (03 Apr 2019 05:24), Max: 98.6 (02 Apr 2019 21:00)  HR: 97 (03 Apr 2019 05:24) (96 - 116)  BP: 99/66 (03 Apr 2019 05:24) (99/66 - 143/87)  BP(mean): --  RR: 17 (03 Apr 2019 05:24) (16 - 18)  SpO2: 100% (03 Apr 2019 05:24) (95% - 100%)    04-02 @ 07:01  -  04-03 @ 07:00  --------------------------------------------------------  IN: 1530 mL / OUT: 1600 mL / NET: -70 mL    04-03 @ 07:01  -  04-03 @ 09:02  --------------------------------------------------------  IN: 250 mL / OUT: 0 mL / NET: 250 mL      PHYSICAL EXAM:    General: frail, in no acute distress  HEENT: MMM, conjunctiva and sclera clear  Gastrointestinal: Soft non-tender non-distended; Normal bowel sounds; No hepatosplenomegaly. No rebound or guarding, ostomy- no stool, per pt bad was just emptied. mucosa of stoma pink,  Skin: Warm and dry. No obvious rash    LABS:                        7.2    9.50  )-----------( 217      ( 03 Apr 2019 02:09 )             23.9     04-03    135  |  105  |  13  ----------------------------<  289<H>  3.5   |  23  |  0.57    Ca    8.3<L>      03 Apr 2019 02:08  Phos  2.7     04-03  Mg     2.2     04-03    TPro  5.9<L>  /  Alb  2.8<L>  /  TBili  0.2  /  DBili  x   /  AST  153<H>  /  ALT  312<H>  /  AlkPhos  310<H>  04-03                      Culture - Urine (collected 01 Apr 2019 08:21)  Source: .Urine Clean Catch (Midstream)  Preliminary Report (02 Apr 2019 11:16):    50,000 CFU/ml Proteus mirabilis    Susceptibility to follow.    Culture in progress    Culture - Blood (collected 01 Apr 2019 05:59)  Source: .Blood Blood-Peripheral  Gram Stain (02 Apr 2019 09:37):    Aerobic and Anaerobic Bottle: Gram Negative Rods    Result called to and read back by_ Ms. SARA Comer RN  04/02/2019 09:36:43    ***Blood Panel PCR results on this specimen are available    approximately 3 hours after the Gram stain result.***    Gram stain, PCR, and/or culture results may not always    correspond due to difference in methodologies.    ************************************************************    This PCR assay was performed using [employer].    The following targets are tested for: Enterococcus,    vancomycin resistant enterococci, Listeria monocytogenes,    coagulase negative staphylococci, S. aureus,    methicillin resistant S. aureus, Streptococcus agalactiae    (Group B), S. pneumoniae, S. pyogenes (Group A),    Acinetobacter baumannii, Enterobacter cloacae, E. coli,    Klebsiella oxytoca, K. pneumoniae, Proteus sp.,    Serratia marcescens, Haemophilus influenzae,    Neisseria meningitidis, Pseudomonas aeruginosa, Candida    albicans, C. glabrata, C krusei, C parapsilosis,    C. tropicalis and the KPC resistance gene.    "Due to technical problems, Proteus sp. will Not be reported as part of    the BCID panel until further notice"  Preliminary Report (03 Apr 2019 08:35):    Growth in aerobic and anaerobic bottles: Proteus species    Identification and susceptibility to follow.  Organism: Blood Culture PCR (02 Apr 2019 10:42)  Organism: Blood Culture PCR (02 Apr 2019 10:42)    Culture - Blood (collected 01 Apr 2019 05:59)  Source: .Blood Blood-Peripheral  Gram Stain (02 Apr 2019 09:40):    Aerobic Bottle: Gram Negative Rods    Aerobic Bottle: Gram Positive Cocci in Clusters    Result called to and read back byJavier Comer RN  04/02/2019 09:36:43    ***Blood Panel PCR results on this specimen are available    approximately 3 hours after the Gram stain result.***    Gram stain, PCR, and/or culture results may not always    correspond due to difference in methodologies.    ************************************************************    This PCR assay was performed using [employer].    The following targets are tested for: Enterococcus,    vancomycin resistant enterococci, Listeria monocytogenes,    coagulase negative staphylococci, S. aureus,    methicillin resistant S. aureus, Streptococcus agalactiae    (Group B), S. pneumoniae, S. pyogenes (Group A),    Acinetobacter baumannii, Enterobacter cloacae, E. coli,    Klebsiella oxytoca, K. pneumoniae, Proteus sp.,    Serratia marcescens, Haemophilus influenzae,    Neisseria meningitidis, Pseudomonas aeruginosa, Candida    albicans, C. glabrata, C krusei, C parapsilosis,    C. tropicalis and the KPC resistance gene.    "Due to technical problems, Proteus sp. will Not be reported as part of    the BCID panel until further notice"  Preliminary Report (03 Apr 2019 08:27):    Growth in aerobic bottle: Proteus species    Growth in aerobic bottle: Coag Negative Staphylococcus    Identification and susceptibility to follow.  Organism: Blood Culture PCR (02 Apr 2019 10:43)  Organism: Blood Culture PCR (02 Apr 2019 10:43)      RADIOLOGY & ADDITIONAL STUDIES:

## 2019-04-03 NOTE — CONSULT NOTE ADULT - ASSESSMENT
IMPRESSION:  Proteus mirabilis bacteremia secondary to UTI.  Suspect the coag negative staph is a contaminant    Recommend:  1.  Stop Zosyn  2.  Check repeat blood cultures now prior to starting antibiotics  3.  Can start meropenem 1 gram IV q8hrs      ID team one will follow

## 2019-04-03 NOTE — PROGRESS NOTE ADULT - SUBJECTIVE AND OBJECTIVE BOX
PMH polio at age 6, breast cancer, diabetes mellitus, endometrial cancer stage 3, s/p exploratory laparotomy, enterolysis, SHAMIR, BSO, pelvic lymphadenectomy, low anterior resection mobilization of splenic flexure, end colostomy on 7/30/18 with rectovaginal fistula presenting from [facility] with fever. She has been on TPN and clears, has joseph in place.    She was previously admitted 2/22-3/25 for management of rectovaginal fistula.  She was initially kept NPO and was given TPN and octreotide daily as conservative approach to treat the fistula whilst providing nutrition. Nutrition team followed Pt daily and made recommendations for TPN and all adjustments necessary. On admission, Pt also found to have a perianal fungal infection most likely caused by stool leakage from vagina. Antifungal was applied at the region daily. Stool output from vagina significantly decreased during hospital stay. Also, urology was consulted for overflow incontinence so joseph catheter was placed. Urine culture was positive for ESBL e-coli and MRSA so Pt was put in isolation and received antibiotics.  Urine culture was repeated 72hrs after antibiotics was stopped per infectious disease recommendations and that came back as negative so isolation was not necessary anymore. Patient was discharged to [facility], and was discharged on 3/25 on TPN, anastrazole daily and anticoagulation treatment.   INTERVAL EVENTS: Pt  says her LE weakness is worse as compared to yesterday. Does not recall working with PT yesterday         MEDICATIONS  (STANDING):  anastrozole 1 milliGRAM(s) Oral daily  enoxaparin Injectable 40 milliGRAM(s) SubCutaneous daily  famotidine Injectable 20 milliGRAM(s) IV Push two times a day  nystatin/triamcinolone Cream 1 Application(s) Topical every 12 hours  pantoprazole  Injectable 40 milliGRAM(s) IV Push daily  Parenteral Nutrition - Adult 1 Each TPN Continuous <Continuous>  piperacillin/tazobactam IVPB. 3.375 Gram(s) IV Intermittent every 6 hours    MEDICATIONS  (PRN):  ibuprofen  Tablet. 600 milliGRAM(s) Oral four times a day PRN Temp greater or equal to 38C (100.4F), Moderate Pain (4 - 6)      Allergies    No Known Allergies    Intolerances        Vital Signs Last 24 Hrs  T(C): 36.2 (03 Apr 2019 05:24), Max: 37 (02 Apr 2019 21:00)  T(F): 97.1 (03 Apr 2019 05:24), Max: 98.6 (02 Apr 2019 21:00)  HR: 97 (03 Apr 2019 05:24) (96 - 116)  BP: 99/66 (03 Apr 2019 05:24) (99/66 - 143/87)  BP(mean): --  RR: 17 (03 Apr 2019 05:24) (16 - 18)  SpO2: 100% (03 Apr 2019 05:24) (95% - 100%)    PHYSICAL EXAM:  Constitutional: Pt appears more tired as compared to yesterday, says she is in more pain.   Mental status: Awake, alert and oriented x3.  Recent and remote memory intact.  Naming, repetition and comprehension intact.  Attention/concentration intact.  No dysarthria, no aphasia.  Fund of knowledge appropriate.    Cranial nerves: Pupils equally round and reactive to light, visual fields full, no nystagmus, some restriction inn vertical eye movement with exophthalmos noted  (TSH WNL) V1 through V3 intact bilaterally and symmetric, face symmetric, hearing intact to finger rub, palate elevation symmetric, tongue was midline, sternocleidomastoid/shoulder shrug strength bilaterally 5/5.    Motor:  Normal bulk.   On exam motor strength with Ankle strength is 1/5   knee flexion and extension 3/5  thigh flexion and extension 2/5.   No dysmetria noted on exam . Atrophy _ve. Pt unable to wiggle toes or  plantar /dorsi flex.   Strength UE 4/5.  Sensation: Intact to light touch  Coordination: No dysmetria on finger-to-nose  Gait: unable to assess       NIHSS:    Fingerstick Blood Glucose: CAPILLARY BLOOD GLUCOSE      POCT Blood Glucose.: 149 mg/dL (01 Apr 2019 16:55)       LABS:                        7.2    9.50  )-----------( 217      ( 03 Apr 2019 02:09 )             23.9     04-03    135  |  105  |  13  ----------------------------<  289<H>  3.5   |  23  |  0.57    Ca    8.3<L>      03 Apr 2019 02:08  Phos  2.7     04-03  Mg     2.2     04-03    TPro  5.9<L>  /  Alb  2.8<L>  /  TBili  0.2  /  DBili  x   /  AST  153<H>  /  ALT  312<H>  /  AlkPhos  310<H>  04-03              RADIOLOGY & ADDITIONAL STUDIES:    IV-tPA (Y/N):                                   Bolus time:  Reason IV-tPA not given:    ASSESSMENT/PLAN:

## 2019-04-03 NOTE — PROGRESS NOTE ADULT - ASSESSMENT
60yo F HD3 readmitted from [facility] with fever, discharged there for apprx 1 week after prolonged hospital stay, with stage IV endometrial adenocarcinoma s/p staging surgery '18 and 1 round of chemotherapy 2/19, previously admitted for management of symptomatic rectovaginal and enterovaginal fistulas 2/22-3/25. Last fever at 0855am 4/1.   Has had increased work of breathing overnight requiring supplemental o2. Currently stable on regional floor.       1. ID: s/p vancomycin and Zosyn in ED, continue with Zosyn 3.375mg q6hrs for fever; f/u urine and blood cultures (blood cx positive for coag neg staph), consider adding vanc, currently afebrile. will get chest x ray to see if new pathology is the cause of respiratory distress  2. FEN/GI - TPN daily with daily labs- to be ordered in AM; replete electrolytes PRN, PICC placed 2/27, lipids every other day- to be ordered today with TPN; reg diet tolerated for comfort; decreasing TPN from 2000cc to 1300cc to be given overnight night, will continue to decrease as patient is eating, also will give less if needed given respiratory status  - elevated liver enzymes, continue to trend, GI consulted appreciate recs, RUQ sono   3. PAIN - no pain  4.  - joseph placed given urinary retention 3/16  - no current active leaking of stool  - perianal irritation consistent with possible fungal infection- switched to nystatin cream 3/23; apply to affected area  as needed   - CT abdomen/pelvis w/ IV and oral contrast performed 3/15 showed improved enterovaginal fistula, no longer contrast in vagina, improvement in disease  5. Endocrine- ISS, has not taken home metformin in >1 month, monitor FS; endocrine consulted last hospital stay for workup of possible DI which was negative per team   6. RESP - cxray, ekg, LE dopplers, lasix 20mg given at 10:40am (pt seen twice in AM)  7. VTE prophylaxis - SCDs, ambulate as tolerated with PT, Lovenox 40qd, PT    8. Neuro- neurology team consulted to evaluate lower extremity weakness persistent, f/u recs. will speak with team today regarding patient's extreme weakness  9. GYN malignancy  - continue anastrozole for treatment  - consider discharge to [facility] to increase patient's performance status, pt desires to go home for private PT however will be unable to be cleared most likely due to extreme weakness.  9. Derm: monitor sacrum for s/s of pressure ulcer. encourage frequent changes in position and OOB during  the day    *will communicate with GI, NEURO, HEME, PT regarding today's plan

## 2019-04-03 NOTE — PROGRESS NOTE ADULT - ASSESSMENT
58 yo F w/ breast cancer, diabetes mellitus, endometrial cancer stage 3, s/p exploratory laparotomy, enterolysis, SHAMIR, BSO, pelvic lymphadenectomy, low anterior resection mobilization of splenic flexure, end colostomy on 7/30/18 with rectovaginal fistula presenting from [facility], [place] with fever, found to have abnormal LFTS    1. Abnormal LFTS- on downward trend  - LFTs normal during last admission, now acutely elevated in setting of sepsis  -there is a high suspicion for ischemic hepatopathy, as SBP dropped to 80s on 4/2 when LFTS acutely lawrence. Now LFTS are trending down  -recommend Abd Us with dopplers to assess vasculature  -please complete full liver work up including:   -Tylenol level  -rule out infectious etiology-Hep A IgG/IgM, Hep B surface antibody/antigen and core antibody, Hep C antibody, EBV, HSV, CMV PCR  -MONIKA, anti smooth muscle, anti LKM, mitochondrial ab, IgG to eval for autoimmune  -lipids, AL1c to eval for CUEVAS, Alpha 1 antitrypsin, ceruloplasmin  -in terms of dili, unlikely to be caused by anastrozole, lovenox, or Nystatin. According to NIH liver tox" Nystatin is not absorbed orally and has not been linked to drug induced liver injury., Serum enzymes are reported to be elevated in 2% to 4% of women treated with anastrozole, but these elevations are usually mild, asymptomatic and self-limited, rarely requiring dose modification" The low molecular weight heparins have been associated with serum aminotransferase elevations in 4% to 13% of patients, but values greater than 5 times the upper limit of normal (ULN) are not common and occur mostly among patients receiving the highest doses.  These elevations generally arise within 3 to 7 days of starting therapy and are usually mild, do not cause symptoms, and resolve rapidly once therapy is stopped.  Nystatin is not absorbed orally and has not been linked to drug induced liver injury.   -TPN unlikely to cause abn LFTS, as usually it results in cholestatic picture  -please trend LFTs  -avoid hepatotoxic agents 60 yo F w/ breast cancer, diabetes mellitus, endometrial cancer stage 3, s/p exploratory laparotomy, enterolysis, SHAMIR, BSO, pelvic lymphadenectomy, low anterior resection mobilization of splenic flexure, end colostomy on 7/30/18 with rectovaginal fistula presenting from [facility], [place] with fever, found to have abnormal LFTS    1. Abnormal LFTS- on downward trend  - LFTs normal during last admission, then acutely elevated in setting of sepsis and hypotension  -there is a high suspicion for ischemic hepatopathy, as SBP dropped to 80s on 4/2 when LFTS acutely lawrence. LFTS are trending down, which is consistent with ischemic hepatopathy as now the bp is stable  -Abd Us with dopplers shows patent vasculature  - full liver work up has been negative thus far:   -Tylenol level wnl  --Hep A/b/C negative;  EBV, HSV studies c/w previous infection. CMV PCR negative  -no evidence of Alpha 1 antitrypsin def  -F/ MONIKA, anti smooth muscle, anti LKM, mitochondrial ab, IgG to eval for autoimmune  -lipids, AL1c to eval for CUEVAS, ceruloplasmin  -iron studies are not consistent with hemochromatosis  -in terms of dili, unlikely to be caused by anastrozole, lovenox, or Nystatin. According to NIH liver tox" Nystatin is not absorbed orally and has not been linked to drug induced liver injury., Serum enzymes are reported to be elevated in 2% to 4% of women treated with anastrozole, but these elevations are usually mild, asymptomatic and self-limited, rarely requiring dose modification" The low molecular weight heparins have been associated with serum aminotransferase elevations in 4% to 13% of patients, but values greater than 5 times the upper limit of normal (ULN) are not common and occur mostly among patients receiving the highest doses.  These elevations generally arise within 3 to 7 days of starting therapy and are usually mild, do not cause symptoms, and resolve rapidly once therapy is stopped.  Nystatin is not absorbed orally and has not been linked to drug induced liver injury.   -TPN unlikely to cause abn LFTS, as usually it results in cholestatic picture  -please trend LFTs  -avoid hepatotoxic agents     GI will sign off. please call with any other questions. Pt can follow up with Jacqueline Das as outpatient. thank you

## 2019-04-03 NOTE — PROGRESS NOTE ADULT - ASSESSMENT
60yo F HD3 readmitted from [facility] with fever, discharged there for apprx 1 week after prolonged hospital stay, with stage IV endometrial adenocarcinoma s/p staging surgery '18 and 1 round of chemotherapy 2/19, previously admitted for management of symptomatic rectovaginal and enterovaginal fistulas 2/22-3/25. Last fever at 0855am 4/1.   Has had increased work of breathing overnight requiring supplemental o2, stable after lasix this AM. Currently stable on regional floor.       1. ID: s/p vancomycin and Zosyn in ED, consulted ID today for the first time this hospital stay, rec d/c Zosyn and add meropenem  2. FEN/GI - TPN daily with daily labs- to be ordered in AM; replete electrolytes PRN, PICC placed 2/27, lipids every other day- to be ordered today with TPN; reg diet tolerated for comfort; decreasing TPN from 2000cc to 1300cc to be given overnight night, will continue to decrease as patient is eating, will run TPN at 80cc/hr overnight.  - elevated liver enzymes, continue to trend, GI consulted appreciate recs, RUQ sono   3. PAIN - no pain  4.  - joseph placed given urinary retention 3/16, per ID will remove and replace  - no current active leaking of stool  - perianal irritation consistent with possible fungal infection- switched to nystatin cream 3/23; apply to affected area  as needed   - CT abdomen/pelvis w/ IV and oral contrast performed 3/15 showed improved enterovaginal fistula, no longer contrast in vagina, improvement in disease  5. Endocrine- ISS, has not taken home metformin in >1 month, monitor FS; endocrine consulted last hospital stay for workup of possible DI which was negative per team   6. RESP - f/u cxray, ekg, LE dopplers, lasix 20mg given at 10:40am with improvement  7. VTE prophylaxis - SCDs, ambulate as tolerated with PT, Lovenox 40qd, PT    8. Neuro- signed off  9. GYN malignancy  - continue anastrozole for treatment  - consider discharge to [facility] to increase patient's performance status, pt desires to go home for private PT however will be unable to be cleared most likely due to extreme weakness.  9. Derm: monitor sacrum for s/s of pressure ulcer. encourage frequent changes in position and OOB during  the day

## 2019-04-03 NOTE — PROGRESS NOTE ADULT - SUBJECTIVE AND OBJECTIVE BOX
PROGRESS NOTE PGY4    Patient seen at bedside. She is on supplemental o2 and has increased work of breathing. She is completely awake, responsive, and comfortable. She has no complaints. No subjective SOB or CP. She was on o2 yesterday and when removed she was saturating 100% on RA. This AM, attempted to remove the 2LNC and she desaturated to 92%. Demonstrated incentive spirometer use with very weak inspiratory effort. Crackles heard on lung exam.     BP 99/66 HR 97 afebrile 02 97% RA  GEN: pt not in acute distress however she has increased work of breathing  LUNGS: lungs with crackles bilaterally  ABD: soft, nt, nd, ostomy w/ no output  EXT: no swelling, erythema, asymmetry, tenderness                          7.2    9.50  )-----------( 217      ( 03 Apr 2019 02:09 )             23.9       04-03    135  |  105  |  13  ----------------------------<  289<H>  3.5   |  23  |  0.57    Ca    8.3<L>      03 Apr 2019 02:08  Phos  2.7     04-03  Mg     2.2     04-03    TPro  5.9<L>  /  Alb  2.8<L>  /  TBili  0.2  /  DBili  x   /  AST  153<H>  /  ALT  312<H>  /  AlkPhos  310<H>  04-03      IMPRESSION:  Hepatic and main portal veins are patent.  Gallbladder wall thickening and trace pericholecystic fluid. Gallbladder   sludge without discrete gallstones. Findings are likely due to   hydrostatic edema.        MEDICATIONS  (STANDING):  anastrozole 1 milliGRAM(s) Oral daily  enoxaparin Injectable 40 milliGRAM(s) SubCutaneous daily  famotidine Injectable 20 milliGRAM(s) IV Push two times a day  fat emulsion (Plant Based) 20% IVPB 50 Gram(s) IV Intermittent once  nystatin/triamcinolone Cream 1 Application(s) Topical every 12 hours  pantoprazole  Injectable 40 milliGRAM(s) IV Push daily  Parenteral Nutrition - Adult 1 Each TPN Continuous <Continuous>  Parenteral Nutrition - Adult 1 Each TPN Continuous <Continuous>  piperacillin/tazobactam IVPB. 3.375 Gram(s) IV Intermittent every 6 hours    MEDICATIONS  (PRN):  ibuprofen  Tablet. 600 milliGRAM(s) Oral four times a day PRN Temp greater or equal to 38C (100.4F), Moderate Pain (4 - 6)

## 2019-04-04 DIAGNOSIS — J90 PLEURAL EFFUSION, NOT ELSEWHERE CLASSIFIED: ICD-10-CM

## 2019-04-04 LAB
-  AMIKACIN: SIGNIFICANT CHANGE UP
-  AMPICILLIN/SULBACTAM: SIGNIFICANT CHANGE UP
-  AMPICILLIN: SIGNIFICANT CHANGE UP
-  AZTREONAM: SIGNIFICANT CHANGE UP
-  CEFAZOLIN: SIGNIFICANT CHANGE UP
-  CEFTRIAXONE: SIGNIFICANT CHANGE UP
-  CIPROFLOXACIN: SIGNIFICANT CHANGE UP
-  CLINDAMYCIN: SIGNIFICANT CHANGE UP
-  ERYTHROMYCIN: SIGNIFICANT CHANGE UP
-  GENTAMICIN: SIGNIFICANT CHANGE UP
-  LEVOFLOXACIN: SIGNIFICANT CHANGE UP
-  LINEZOLID: SIGNIFICANT CHANGE UP
-  MEROPENEM: SIGNIFICANT CHANGE UP
-  MEROPENEM: SIGNIFICANT CHANGE UP
-  NITROFURANTOIN: SIGNIFICANT CHANGE UP
-  NITROFURANTOIN: SIGNIFICANT CHANGE UP
-  OXACILLIN: SIGNIFICANT CHANGE UP
-  PENICILLIN: SIGNIFICANT CHANGE UP
-  PIPERACILLIN/TAZOBACTAM: SIGNIFICANT CHANGE UP
-  RIFAMPIN: SIGNIFICANT CHANGE UP
-  TETRACYCLINE: SIGNIFICANT CHANGE UP
-  TIGECYCLINE: SIGNIFICANT CHANGE UP
-  TOBRAMYCIN: SIGNIFICANT CHANGE UP
-  TRIMETHOPRIM/SULFAMETHOXAZOLE: SIGNIFICANT CHANGE UP
-  VANCOMYCIN: SIGNIFICANT CHANGE UP
-  VANCOMYCIN: SIGNIFICANT CHANGE UP
ALBUMIN SERPL ELPH-MCNC: 3.1 G/DL — LOW (ref 3.3–5)
ALP SERPL-CCNC: 352 U/L — HIGH (ref 40–120)
ALT FLD-CCNC: 210 U/L — HIGH (ref 10–45)
ANION GAP SERPL CALC-SCNC: 11 MMOL/L — SIGNIFICANT CHANGE UP (ref 5–17)
AST SERPL-CCNC: 47 U/L — HIGH (ref 10–40)
BASOPHILS # BLD AUTO: 0.01 K/UL — SIGNIFICANT CHANGE UP (ref 0–0.2)
BASOPHILS NFR BLD AUTO: 0.2 % — SIGNIFICANT CHANGE UP (ref 0–2)
BILIRUB SERPL-MCNC: 0.2 MG/DL — SIGNIFICANT CHANGE UP (ref 0.2–1.2)
BUN SERPL-MCNC: 16 MG/DL — SIGNIFICANT CHANGE UP (ref 7–23)
CALCIUM SERPL-MCNC: 8.5 MG/DL — SIGNIFICANT CHANGE UP (ref 8.4–10.5)
CHLORIDE SERPL-SCNC: 96 MMOL/L — SIGNIFICANT CHANGE UP (ref 96–108)
CO2 SERPL-SCNC: 35 MMOL/L — HIGH (ref 22–31)
CREAT SERPL-MCNC: 0.52 MG/DL — SIGNIFICANT CHANGE UP (ref 0.5–1.3)
CULTURE RESULTS: SIGNIFICANT CHANGE UP
CULTURE RESULTS: SIGNIFICANT CHANGE UP
EOSINOPHIL # BLD AUTO: 0.11 K/UL — SIGNIFICANT CHANGE UP (ref 0–0.5)
EOSINOPHIL NFR BLD AUTO: 2 % — SIGNIFICANT CHANGE UP (ref 0–6)
GLUCOSE SERPL-MCNC: 265 MG/DL — HIGH (ref 70–99)
GRAM STN FLD: SIGNIFICANT CHANGE UP
HCT VFR BLD CALC: 23 % — LOW (ref 34.5–45)
HGB BLD-MCNC: 7 G/DL — CRITICAL LOW (ref 11.5–15.5)
HSV1 AB FLD QL: SIGNIFICANT CHANGE UP TITER
HSV2 AB FLD-ACNC: SIGNIFICANT CHANGE UP TITER
IMM GRANULOCYTES NFR BLD AUTO: 0.9 % — SIGNIFICANT CHANGE UP (ref 0–1.5)
LKM AB SER-ACNC: <20.1 UNITS — SIGNIFICANT CHANGE UP (ref 0–20)
LYMPHOCYTES # BLD AUTO: 1.33 K/UL — SIGNIFICANT CHANGE UP (ref 1–3.3)
LYMPHOCYTES # BLD AUTO: 24.1 % — SIGNIFICANT CHANGE UP (ref 13–44)
MAGNESIUM SERPL-MCNC: 1.6 MG/DL — SIGNIFICANT CHANGE UP (ref 1.6–2.6)
MCHC RBC-ENTMCNC: 30.3 PG — SIGNIFICANT CHANGE UP (ref 27–34)
MCHC RBC-ENTMCNC: 30.4 GM/DL — LOW (ref 32–36)
MCV RBC AUTO: 99.6 FL — SIGNIFICANT CHANGE UP (ref 80–100)
METHOD TYPE: SIGNIFICANT CHANGE UP
MONOCYTES # BLD AUTO: 0.53 K/UL — SIGNIFICANT CHANGE UP (ref 0–0.9)
MONOCYTES NFR BLD AUTO: 9.6 % — SIGNIFICANT CHANGE UP (ref 2–14)
NEUTROPHILS # BLD AUTO: 3.48 K/UL — SIGNIFICANT CHANGE UP (ref 1.8–7.4)
NEUTROPHILS NFR BLD AUTO: 63.2 % — SIGNIFICANT CHANGE UP (ref 43–77)
NRBC # BLD: 0 /100 WBCS — SIGNIFICANT CHANGE UP (ref 0–0)
ORGANISM # SPEC MICROSCOPIC CNT: SIGNIFICANT CHANGE UP
PHOSPHATE SERPL-MCNC: 2.1 MG/DL — LOW (ref 2.5–4.5)
PLATELET # BLD AUTO: 231 K/UL — SIGNIFICANT CHANGE UP (ref 150–400)
POTASSIUM SERPL-MCNC: 3.3 MMOL/L — LOW (ref 3.5–5.3)
POTASSIUM SERPL-SCNC: 3.3 MMOL/L — LOW (ref 3.5–5.3)
PROT SERPL-MCNC: 6.4 G/DL — SIGNIFICANT CHANGE UP (ref 6–8.3)
RBC # BLD: 2.31 M/UL — LOW (ref 3.8–5.2)
RBC # FLD: 15.8 % — HIGH (ref 10.3–14.5)
SODIUM SERPL-SCNC: 142 MMOL/L — SIGNIFICANT CHANGE UP (ref 135–145)
SPECIMEN SOURCE: SIGNIFICANT CHANGE UP
SPECIMEN SOURCE: SIGNIFICANT CHANGE UP
WBC # BLD: 5.51 K/UL — SIGNIFICANT CHANGE UP (ref 3.8–10.5)
WBC # FLD AUTO: 5.51 K/UL — SIGNIFICANT CHANGE UP (ref 3.8–10.5)

## 2019-04-04 PROCEDURE — 99232 SBSQ HOSP IP/OBS MODERATE 35: CPT | Mod: GC,25

## 2019-04-04 PROCEDURE — 93306 TTE W/DOPPLER COMPLETE: CPT | Mod: 26

## 2019-04-04 PROCEDURE — 99232 SBSQ HOSP IP/OBS MODERATE 35: CPT

## 2019-04-04 PROCEDURE — 71045 X-RAY EXAM CHEST 1 VIEW: CPT | Mod: 26

## 2019-04-04 PROCEDURE — 76604 US EXAM CHEST: CPT | Mod: 26

## 2019-04-04 PROCEDURE — 93970 EXTREMITY STUDY: CPT | Mod: 26

## 2019-04-04 PROCEDURE — 99232 SBSQ HOSP IP/OBS MODERATE 35: CPT | Mod: GC

## 2019-04-04 RX ORDER — CHLORHEXIDINE GLUCONATE 213 G/1000ML
1 SOLUTION TOPICAL
Qty: 0 | Refills: 0 | Status: DISCONTINUED | OUTPATIENT
Start: 2019-04-04 | End: 2019-04-17

## 2019-04-04 RX ORDER — SODIUM,POTASSIUM PHOSPHATES 278-250MG
1 POWDER IN PACKET (EA) ORAL
Qty: 0 | Refills: 0 | Status: COMPLETED | OUTPATIENT
Start: 2019-04-04 | End: 2019-04-05

## 2019-04-04 RX ORDER — ERTAPENEM SODIUM 1 G/1
1000 INJECTION, POWDER, LYOPHILIZED, FOR SOLUTION INTRAMUSCULAR; INTRAVENOUS EVERY 24 HOURS
Qty: 0 | Refills: 0 | Status: DISCONTINUED | OUTPATIENT
Start: 2019-04-04 | End: 2019-04-17

## 2019-04-04 RX ORDER — MAGNESIUM SULFATE 500 MG/ML
2 VIAL (ML) INJECTION ONCE
Qty: 0 | Refills: 0 | Status: COMPLETED | OUTPATIENT
Start: 2019-04-04 | End: 2019-04-04

## 2019-04-04 RX ORDER — FUROSEMIDE 40 MG
20 TABLET ORAL ONCE
Qty: 0 | Refills: 0 | Status: COMPLETED | OUTPATIENT
Start: 2019-04-04 | End: 2019-04-04

## 2019-04-04 RX ADMIN — MEROPENEM 100 MILLIGRAM(S): 1 INJECTION INTRAVENOUS at 01:13

## 2019-04-04 RX ADMIN — Medication 1 APPLICATION(S): at 22:21

## 2019-04-04 RX ADMIN — ANASTROZOLE 1 MILLIGRAM(S): 1 TABLET ORAL at 11:14

## 2019-04-04 RX ADMIN — Medication 1 PACKET(S): at 17:07

## 2019-04-04 RX ADMIN — ERTAPENEM SODIUM 120 MILLIGRAM(S): 1 INJECTION, POWDER, LYOPHILIZED, FOR SOLUTION INTRAMUSCULAR; INTRAVENOUS at 18:22

## 2019-04-04 RX ADMIN — ENOXAPARIN SODIUM 40 MILLIGRAM(S): 100 INJECTION SUBCUTANEOUS at 11:03

## 2019-04-04 RX ADMIN — MEROPENEM 100 MILLIGRAM(S): 1 INJECTION INTRAVENOUS at 11:03

## 2019-04-04 RX ADMIN — PANTOPRAZOLE SODIUM 40 MILLIGRAM(S): 20 TABLET, DELAYED RELEASE ORAL at 11:03

## 2019-04-04 RX ADMIN — Medication 20 MILLIGRAM(S): at 07:51

## 2019-04-04 RX ADMIN — FAMOTIDINE 20 MILLIGRAM(S): 10 INJECTION INTRAVENOUS at 05:22

## 2019-04-04 RX ADMIN — FAMOTIDINE 20 MILLIGRAM(S): 10 INJECTION INTRAVENOUS at 18:23

## 2019-04-04 RX ADMIN — Medication 50 GRAM(S): at 14:05

## 2019-04-04 NOTE — PROGRESS NOTE ADULT - ASSESSMENT
60yo F HD4 with stage IV endometrial adenocarcinoma s/p staging surgery '18 and 1 round of chemotherapy 2/19 readmitted from [facility] with fever previously admitted for management of symptomatic rectovaginal and enterovaginal fistulas 2/22-3/25 now bacteremic. Last fever at 0855am 4/1. Experiences intermittent increased work of breathing requiring supplemental O2 s/p Lasix 20mg IV today.       1. Neuro: no complaints of pain- Motrin prn   2. Pulm -Chest x-ray today reveals "mild interval worsening of a small to moderate layering right pleural effusion, right basilar consolidation/atelectasis and right middle lung zone linear atelectasis." Follow up pulmonology consult   Lower extremity dopplers negative  3. Cardio- Echo: within normal limits   4. FEN/GI - Reg diet. TPN held for tonight because for right pleural effusion (suspected fluid overload from TPN)-, PICC placed 2/27, TPN with lipids every other day- (next 4/5)   - elevated liver enzymes, continue to trend, GI consulted appreciate recs, RUQ sono performed  - No current active leaking of stool  5.  - Westbrook placed given urinary retention 3/16  - Perianal irritation consistent with possible fungal infection- continue nystatin cream 3/23; apply to affected area as needed   - CT abdomen/pelvis w/ IV and oral contrast performed 3/15 showed improved enterovaginal fistula, no longer contrast in vagina, improvement in disease  6. ID: Bacteremia- Blood culture 4/1 positive for gram neg rods, coag neg staph, proteus. Blood culture from PICC 4/3 positive for gram neg rods.   ID following- recommends switching Meropenem to Ertapenem today. s/p Vancomycin, Zosyn (4/1-4/3), and Meropenem (4/3-4/4)  7. Endocrine- ISS, has not taken home metformin in >1 month, monitor FS; endocrine consulted last hospital stay for workup of possible DI which was negative per team   8. VTE prophylaxis - SCDs, ambulate as tolerated, Lovenox 40 daily, PT following    9. Neuro- signed off. Rec: outpatient EMG   10. GYN malignancy  - continue anastrozole for treatment  - consider discharge to [facility] to increase patient's performance status, pt desires to go home for private PT however will be unable to be cleared most likely due to extreme weakness.  11. Derm: monitor sacrum for s/s of pressure ulcer. encourage frequent changes in position and OOB during  the day. 60yo F HD4 with stage IV endometrial adenocarcinoma s/p staging surgery '18 and 1 round of chemotherapy 2/19 readmitted from [facility] with fever previously admitted for management of symptomatic rectovaginal and enterovaginal fistulas 2/22-3/25 now bacteremic. Last fever at 0855am 4/1. Experiences intermittent increased work of breathing requiring supplemental O2 s/p Lasix 20mg IV today.       1. Neuro: no complaints of pain- Motrin prn   2. Pulm -Chest x-ray today reveals "mild interval worsening of a small to moderate layering right pleural effusion, right basilar consolidation/atelectasis and right middle lung zone linear atelectasis." Follow up pulmonology consult   Lower extremity dopplers negative  3. Cardio- Echo: within normal limits   4. FEN/GI - Reg diet. TPN held for tonight because for right pleural effusion (suspected fluid overload from TPN)-, PICC placed 2/27, TPN with lipids every other day- (next 4/5)   - elevated liver enzymes, continue to trend, GI consulted appreciate recs, RUQ sono performed  - No current active leaking of stool  5.  - Westbrook placed given urinary retention 3/16  - Perianal irritation consistent with possible fungal infection- continue nystatin cream 3/23; apply to affected area as needed   - CT abdomen/pelvis w/ IV and oral contrast performed 3/15 showed improved enterovaginal fistula, no longer contrast in vagina, improvement in disease  6. ID: Bacteremia- Blood culture 4/1 positive for gram neg rods, coag neg staph, proteus. Blood culture from PICC 4/3 positive for gram neg rods.   ID following- recommends switching Meropenem to Ertapenem today. s/p Vancomycin, Zosyn (4/1-4/3), and Meropenem (4/3-4/4)  7. Endocrine- ISS, has not taken home metformin in >1 month, monitor FS; endocrine consulted last hospital stay for workup of possible DI which was negative per team   8. VTE prophylaxis - SCDs, ambulate as tolerated, Lovenox 40 daily, PT following    9. Neuro- signed off. Rec: outpatient EMG   10. GYN malignancy  - continue anastrozole for treatment  - consider discharge to [facility] to increase patient's performance status, pt desires to go home for private PT however will be unable to be cleared most likely due to extreme weakness.  11. Derm: monitor sacrum for s/s of pressure ulcer. encourage frequent changes in position and OOB during  the day.     PM ROUNDS UPDATES:  - pulm recommending CT scan for further lung evaluation given clinical and xray findings. patient ultimately will need a CT however holding for now until patient is more stable/increased interval from last CT  - primary team (gyn onc) does not plan on removing the PICC line at this time until IV access can be otherwise established and no further TPN is needed  - TPN not ordered for tonight because last night's bag is still running at 40cc/hr and the patient is tolerating reg diet; also do not want to fluid overload the patient  - gyn onc attending to discuss with neuro attending plan for inpatient EMG

## 2019-04-04 NOTE — CONSULT NOTE ADULT - ATTENDING COMMENTS
Bacteremic with acute shortness of breath. Bilateral bedside thoracic POCUS with no left pleural effusion and small right pleural effusion, no evidence of respiratory compromise. There is localized consolidation on the right anterior fields; recommend CT chest for further evaluation. Continue with antibiotics.

## 2019-04-04 NOTE — PROGRESS NOTE ADULT - ATTENDING COMMENTS
ESBL Proteus BSI ?2/2 UTI vs. PICC line. Bcx 4/3 growing GNR--recommend removal of PICC line (may send tip for culture). Check surveillance bcx--PICC will only be able to be replaced once bcx negative X 48h. Continue ertapenem.

## 2019-04-04 NOTE — PROGRESS NOTE ADULT - ASSESSMENT
IMPRESSION:  Proteus mirabilis bacteremia secondary to UTI vs line infection.  Suspect the coag negative staph is a contaminant    Recommend:  1. Change meropenem to ertapenem 1g q24   2. Please remove PICC line and may send tip for culture   3. Repeat blood cultures +, per primary team drawn from PICC line   4. Continue with surveillance cultures daily until cleared     ID team one will follow

## 2019-04-04 NOTE — PROGRESS NOTE ADULT - ASSESSMENT
58 yo F stage IV endometrial adenocarcinoma s/p staging surgery 7/18 and 1 round of chemotherapy 2/19 course c/b with rectovaginal fistula presenting from [facility] with fever up to 100.5. Pt has been on TPN and recently hospitalized, now with bacteremia and persistent anemia.    #Macrocytic Anemia   Multiple etiologies of anemia, likely multifactorial and contributing to lethargy and clinical appearance, causes likely include nutritional and inflammatory. Chronic hx of anemia, previous baselines around hg 8.     -Hx of TPN, albumin <3, macrocytosis however B12 and folate levels wnl   -Iron panel c/w anemia of chronic inflammation likely 2/2 bacteremia, along with elevations in CRP and sed rate, however hard to r/o concomitant Iron deficiency anemia, low retic count points away from acute bleed, given ongoing infection and liver abnormalities would avoid iron supplementation   -no signs of hemolysis (neg bili), no signs of TMAs/thrombocytopenia   -per outpt gyn/onc patient received 1 cycle of Paclitaxel/Carboplatin in 2/2019, anticipated katheryn up to 3 weeks however dose dependent and more significant in elderly, however previous exposure to chemo in 2009 (need to obtain collateral, if anthracycline exposure (topoisomerase inhib) could consider treatment related dysplasia, although would affect more than one cell line  -viral studies   -if above work up neg, will consider primary marrow eval  -viral studies   -transfuse for Hg <7 (transfuse as tolerated per pulm funct)   -dvt ppx - lovenox (monitor for bleeding)       Discussed w/ Dr. García

## 2019-04-04 NOTE — PROGRESS NOTE ADULT - SUBJECTIVE AND OBJECTIVE BOX
INFECTIOUS DISEASE CONSULT PROGRESS NOTE    INTERVAL HPI/OVERNIGHT EVENTS:    ACTIVE ANTIMICROBIALS/ANTIBIOTICS:  meropenem  IVPB 1000 milliGRAM(s) IV Intermittent every 8 hours  meropenem  IVPB          VITAL SIGNS:  ICU Vital Signs Last 24 Hrs  T(C): 36.4 (04 Apr 2019 05:31), Max: 36.5 (03 Apr 2019 17:53)  T(F): 97.5 (04 Apr 2019 05:31), Max: 97.7 (03 Apr 2019 17:53)  HR: 79 (04 Apr 2019 05:31) (79 - 100)  BP: 124/72 (04 Apr 2019 05:31) (94/65 - 124/72)  BP(mean): --  ABP: --  ABP(mean): --  RR: 17 (04 Apr 2019 05:31) (15 - 17)  SpO2: 98% (04 Apr 2019 05:31) (95% - 98%)      PHYSICAL EXAM:      LABS:                        7.2    9.50  )-----------( 217      ( 03 Apr 2019 02:09 )             23.9       04-03    135  |  105  |  13  ----------------------------<  289<H>  3.5   |  23  |  0.57    Ca    8.3<L>      03 Apr 2019 02:08  Phos  2.7     04-03  Mg     2.2     04-03    TPro  5.9<L>  /  Alb  2.8<L>  /  TBili  0.2  /  DBili  x   /  AST  153<H>  /  ALT  312<H>  /  AlkPhos  310<H>  04-03          MICROBIOLOGY:    Culture - Urine (04.01.19 @ 08:21)    Specimen Source: .Urine Clean Catch (Midstream)    Culture Results:   50,000 CFU/ml Proteus mirabilis Susceptibility to follow.  10,000 CFU/ml Enterococcus faecalis Susceptibility to follow.    Culture - Blood (04.01.19 @ 05:59)    -  Coagulase negative Staphylococcus: Detec    Gram Stain:   Aerobic Bottle: Gram Negative Rods  Aerobic Bottle: Gram Positive Cocci in Clusters  Result called to and read back by_ Ms. SARA Comer RN  04/02/2019 09:36:43  ***Blood Panel PCR results on this specimen are available  approximately 3 hours after the Gram stain result.***  Gram stain, PCR, and/or culture results may not always  correspond due to difference in methodologies.  ************************************************************  This PCR assay was performed using [employer].  The following targets are tested for: Enterococcus,  vancomycin resistant enterococci, Listeria monocytogenes,  coagulase negative staphylococci, S. aureus,  methicillin resistant S. aureus, Streptococcus agalactiae  (Group B), S. pneumoniae, S. pyogenes (Group A),  Acinetobacter baumannii, Enterobacter cloacae, E. coli,  Klebsiella oxytoca, K. pneumoniae, Proteus sp.,  Serratia marcescens, Haemophilus influenzae,  Neisseria meningitidis, Pseudomonas aeruginosa, Candida  albicans, C. glabrata, C krusei, C parapsilosis,  C. tropicalis and the KPC resistance gene.  "Due to technical problems, Proteus sp. will Not be reported as part of  the BCID panel until further notice"    Specimen Source: .Blood Blood-Peripheral    Organism: Blood Culture PCR    Culture Results:   Growth in aerobic bottle: Proteus species  Growth in aerobic bottle: Coag Negative Staphylococcus  Identification and susceptibility to follow.    Organism Identification: Blood Culture PCR    Method Type: PCR    Culture - Urine (03.06.19 @ 21:49)    -  Ampicillin: R >16 These ampicillin results predict results for amoxicillin    -  Amikacin: S <=8    -  Trimethoprim/Sulfamethoxazole: R >2/38    -  Trimethoprim/Sulfamethoxazole: R >2/38    -  Vancomycin: S 1.5    -  Piperacillin/Tazobactam: R <=8    -  RIF- Rifampin: S <=1 Should not be used as monotherapy    -  Tetra/Doxy: S <=1    -  Nitrofurantoin: R >64 Should not be used to treat pyelonephritis    -  Oxacillin: R >2    -  Penicillin: R >8    -  Meropenem: S 0.047    -  Gentamicin: R >8    -  Linezolid: S 2    -  Ciprofloxacin: R >2    -  Daptomycin: S 0.5    -  Ceftriaxone: R >32 Enterobacter, Citrobacter, and Serratia may develop resistance during prolonged therapy    -  Cefazolin: R >16 For uncomplicated UTI with K. pneumoniae, E. coli, or P. mirablis: RAJIV <=16 is sensitive and RAJIV >=32 is resistant. This also predicts results for oral agents cefaclor, cefdinir, cefpodoxime, cefprozil, cefuroxime axetil, cephalexin and locarbef for uncomplicated UTI. Note that some isolates may be susceptible to these agents while testing resistant to cefazolin.    -  Cefazolin: R >16    -  Ampicillin/Sulbactam: R 8/4    -  Tobramycin: R >8    Specimen Source: Catheterized indwelling    Culture Results:   20,000 CFU/ml Proteus mirabilis ESBL  Floor previously notified.  >100,000 CFU/ml Methicillin resistant Staphylococcus aureus  Result called to and read back byJavier Hui RN 03/07/2019 15:07:46    Organism Identification: Proteus mirabilis ESBL  Proteus mirabilis ESBL  Methicillin resistant Staphylococcus aureus  Methicillin resistant Staphylococcus aureus    Organism: Proteus mirabilis ESBL    Organism: Proteus mirabilis ESBL    Organism: Methicillin resistant Staphylococcus aureus    Organism: Methicillin resistant Staphylococcus aureus    Method Type: RAJIV    Method Type: ETEST    Method Type: RAJIV    Method Type: ETEST INFECTIOUS DISEASE CONSULT PROGRESS NOTE    INTERVAL HPI/OVERNIGHT EVENTS: No fevers overnight and WBC 9. Patient denies any complaints in the AM.     ACTIVE ANTIMICROBIALS/ANTIBIOTICS:  meropenem  IVPB 1000 milliGRAM(s) IV Intermittent every 8 hours  meropenem  IVPB          VITAL SIGNS:  ICU Vital Signs Last 24 Hrs  T(C): 36.4 (04 Apr 2019 05:31), Max: 36.5 (03 Apr 2019 17:53)  T(F): 97.5 (04 Apr 2019 05:31), Max: 97.7 (03 Apr 2019 17:53)  HR: 79 (04 Apr 2019 05:31) (79 - 100)  BP: 124/72 (04 Apr 2019 05:31) (94/65 - 124/72)  BP(mean): --  ABP: --  ABP(mean): --  RR: 17 (04 Apr 2019 05:31) (15 - 17)  SpO2: 98% (04 Apr 2019 05:31) (95% - 98%)      PHYSICAL EXAM:  Constitutional: non-toxic  Ear/Nose/Throat: no oral lesion, no sinus tenderness on percussion	  Neck:no JVD, no lymphadenopathy, supple  Respiratory: CTA heath, dec breath sounds at bases   Cardiovascular: S1S2 RRR, no murmurs  Gastrointestinal: distended, ostomy in place  Extremities: + edema. +PICC   Vascular: DP Pulse:	right normal; left normal    LABS:                        7.2    9.50  )-----------( 217      ( 03 Apr 2019 02:09 )             23.9       04-03    135  |  105  |  13  ----------------------------<  289<H>  3.5   |  23  |  0.57    Ca    8.3<L>      03 Apr 2019 02:08  Phos  2.7     04-03  Mg     2.2     04-03    TPro  5.9<L>  /  Alb  2.8<L>  /  TBili  0.2  /  DBili  x   /  AST  153<H>  /  ALT  312<H>  /  AlkPhos  310<H>  04-03      MICROBIOLOGY:    Culture - Urine (04.01.19 @ 08:21)    Specimen Source: .Urine Clean Catch (Midstream)    Culture Results:   50,000 CFU/ml Proteus mirabilis Susceptibility to follow.  10,000 CFU/ml Enterococcus faecalis Susceptibility to follow.    Culture - Blood (04.01.19 @ 05:59)    -  Coagulase negative Staphylococcus: Detec    Gram Stain:   Aerobic Bottle: Gram Negative Rods  Aerobic Bottle: Gram Positive Cocci in Clusters  Result called to and read back by_ Ms. S. Souleymane ALVAREZ  04/02/2019 09:36:43  ***Blood Panel PCR results on this specimen are available  approximately 3 hours after the Gram stain result.***  Gram stain, PCR, and/or culture results may not always  correspond due to difference in methodologies.  ************************************************************  This PCR assay was performed using [employer].  The following targets are tested for: Enterococcus,  vancomycin resistant enterococci, Listeria monocytogenes,  coagulase negative staphylococci, S. aureus,  methicillin resistant S. aureus, Streptococcus agalactiae  (Group B), S. pneumoniae, S. pyogenes (Group A),  Acinetobacter baumannii, Enterobacter cloacae, E. coli,  Klebsiella oxytoca, K. pneumoniae, Proteus sp.,  Serratia marcescens, Haemophilus influenzae,  Neisseria meningitidis, Pseudomonas aeruginosa, Candida  albicans, C. glabrata, C krusei, C parapsilosis,  C. tropicalis and the KPC resistance gene.  "Due to technical problems, Proteus sp. will Not be reported as part of  the BCID panel until further notice"    Specimen Source: .Blood Blood-Peripheral    Organism: Blood Culture PCR    Culture Results:   Growth in aerobic bottle: Proteus species  Growth in aerobic bottle: Coag Negative Staphylococcus  Identification and susceptibility to follow.    Organism Identification: Blood Culture PCR    Method Type: PCR    Culture - Urine (03.06.19 @ 21:49)    -  Ampicillin: R >16 These ampicillin results predict results for amoxicillin    -  Amikacin: S <=8    -  Trimethoprim/Sulfamethoxazole: R >2/38    -  Trimethoprim/Sulfamethoxazole: R >2/38    -  Vancomycin: S 1.5    -  Piperacillin/Tazobactam: R <=8    -  RIF- Rifampin: S <=1 Should not be used as monotherapy    -  Tetra/Doxy: S <=1    -  Nitrofurantoin: R >64 Should not be used to treat pyelonephritis    -  Oxacillin: R >2    -  Penicillin: R >8    -  Meropenem: S 0.047    -  Gentamicin: R >8    -  Linezolid: S 2    -  Ciprofloxacin: R >2    -  Daptomycin: S 0.5    -  Ceftriaxone: R >32 Enterobacter, Citrobacter, and Serratia may develop resistance during prolonged therapy    -  Cefazolin: R >16 For uncomplicated UTI with K. pneumoniae, E. coli, or P. mirablis: RAJIV <=16 is sensitive and RAJIV >=32 is resistant. This also predicts results for oral agents cefaclor, cefdinir, cefpodoxime, cefprozil, cefuroxime axetil, cephalexin and locarbef for uncomplicated UTI. Note that some isolates may be susceptible to these agents while testing resistant to cefazolin.    -  Cefazolin: R >16    -  Ampicillin/Sulbactam: R 8/4    -  Tobramycin: R >8    Specimen Source: Catheterized indwelling    Culture Results:   20,000 CFU/ml Proteus mirabilis ESBL  Floor previously notified.  >100,000 CFU/ml Methicillin resistant Staphylococcus aureus  Result called to and read back byJavier Hui RN 03/07/2019 15:07:46    Organism Identification: Proteus mirabilis ESBL  Proteus mirabilis ESBL  Methicillin resistant Staphylococcus aureus  Methicillin resistant Staphylococcus aureus    Organism: Proteus mirabilis ESBL    Organism: Proteus mirabilis ESBL    Organism: Methicillin resistant Staphylococcus aureus    Organism: Methicillin resistant Staphylococcus aureus    Method Type: RAJIV    Method Type: ETEST    Method Type: RAJIV    Method Type: ETEST

## 2019-04-04 NOTE — CONSULT NOTE ADULT - ASSESSMENT
59 year old female with PMHx significant for Polio, Breast Cancer, Diabetes Mellitus, Endometrial Cancer stage 3, s/p exploratory laparotomy, enterolysis, SHAMIR, BSO, pelvic lymphadenectomy, low anterior resection mobilization of splenic flexure, end colostomy on 7/30/18 with recent admission (2/22-3/25) for management of rectovaginal fistula and CAUTI - now presenting from [facility] with reported fever 100.5 F. Patient found to be bacteremic with ESBL proteus mirabilis, with urine growing the same organism along with Enterococcus fecalis.     Pulmonology consulted for evaluation of shortness breath and R pleural effusion.

## 2019-04-04 NOTE — PROGRESS NOTE ADULT - SUBJECTIVE AND OBJECTIVE BOX
Pt seen and examined at bedside. Pt states increased work of breathing this morning but better now. She has been using 2L NC intermittently. Pt has an appetite and had chicken for lunch, water and juice. She denies any nausea, vomiting or stool leakage from her vagina. She has not been seen by PT yet today and she states she wants to walk and sit on her chair.   Pt denies fever, chills, chest pain, lightheadedness, or dizziness.      T(F): 97.7 (04-04-19 @ 16:39), Max: 99.3 (04-04-19 @ 13:54)  HR: 94 (04-04-19 @ 16:39) (79 - 102)  BP: 126/82 (04-04-19 @ 16:39) (109/71 - 126/82)  RR: 17 (04-04-19 @ 16:39) (16 - 17)  SpO2: 97% (04-04-19 @ 16:39) (95% - 98%)  Wt(kg): --  I&O's Summary    03 Apr 2019 07:01  -  04 Apr 2019 07:00  --------------------------------------------------------  IN: 1290 mL / OUT: 2950 mL / NET: -1660 mL    04 Apr 2019 07:01  -  04 Apr 2019 18:17  --------------------------------------------------------  IN: 100 mL / OUT: 2700 mL / NET: -2600 mL    MEDICATIONS  (STANDING):  anastrozole 1 milliGRAM(s) Oral daily  chlorhexidine 2% Cloths 1 Application(s) Topical <User Schedule>  enoxaparin Injectable 40 milliGRAM(s) SubCutaneous daily  ertapenem  IVPB 1000 milliGRAM(s) IV Intermittent every 24 hours  famotidine Injectable 20 milliGRAM(s) IV Push two times a day  nystatin/triamcinolone Cream 1 Application(s) Topical every 12 hours  pantoprazole  Injectable 40 milliGRAM(s) IV Push daily  Parenteral Nutrition - Adult 1 Each TPN Continuous <Continuous>  potassium phosphate / sodium phosphate powder 1 Packet(s) Oral four times a day    MEDICATIONS  (PRN):  ibuprofen  Tablet. 600 milliGRAM(s) Oral every 6 hours PRN Mild Pain (1 - 3)    Physical Exam:  Constitutional: NAD  Pulmonary: decreased breathe sounds R lung base with crackles    Cardiovascular: Regular rate and rhythm   Abdomen: Soft, nontender, nondistended, no guarding, no rebound, ostomy intact   Extremities: no lower extremity edema or calve tenderness. SCDs in place   Skin: redness and slight skin breakage/irritation on sacrum    LABS:                        7.0    5.51  )-----------( 231      ( 04 Apr 2019 13:04 )             23.0     04-04    142  |  96  |  16  ----------------------------<  265<H>  3.3<L>   |  35<H>  |  0.52    Ca    8.5      04 Apr 2019 13:04  Phos  2.1     04-04  Mg     1.6     04-04    TPro  6.4  /  Alb  3.1<L>  /  TBili  0.2  /  DBili  x   /  AST  47<H>  /  ALT  210<H>  /  AlkPhos  352<H>  04-04      RADIOLOGY & ADDITIONAL TESTS:  EXAM:  US DPLX LWR EXT VEINS COMPL BI                          PROCEDURE DATE:  04/04/2019      INTERPRETATION:    VENOUS DUPLEX DOPPLER OF BOTH LOWER EXTREMITIES dated 4/4/2019 10:23 AM    INDICATION: r/o DVT bilateral lower extremity edema.    TECHNIQUE: Duplex Doppler evaluation including gray-scale ultrasound   imaging, color flow Doppler imaging, and Doppler spectral analysis of the   veins of both lower extremities was performed.     COMPARISON: None    FINDINGS:    Thigh veins: The common femoral, femoral, popliteal, proximal greater   saphenous, and proximal deep femoral veins are patent and free of   thrombus bilaterally. The veins are normally compressible and have normal   phasic flow and augmentation response.    Calf veins:The paired peroneal and posterior tibial calf veins are   patent bilaterally.    IMPRESSION:  No deep vein thrombosis seen.    EXAM:  XR CHEST PORTABLE URGENT 1V                          PROCEDURE DATE:  04/04/2019      INTERPRETATION:  XR CHEST PORTABLE URGENT 1V dated 4/4/2019 12:10 PM.    INDICATION: Sepsis. Shortness of breath. Desaturation.    COMPARISON: Chest radiograph 4/3/2019 at 10:36 AM.    FINDINGS:  Single AP view of the chest is submitted. Right chest port and PICC   unchanged. Stable cardiothymic silhouette contours. Mild increase in   layering right pleural effusion with right basilar   consolidation/atelectasis. No significant change in small left layering   pleural effusion and left basilar consolidation/atelectasis. Linear   atelectasis in the right midlung zone is new from prior study. Pulmonary   venous congestion unchanged. No pneumothorax. Degenerative osseous   changes. Ossific loose body noted in the left axillary recess.    IMPRESSION:  Mild interval worsening of a small to moderate layering right pleural   effusion, right basilar consolidation/atelectasis and right middle lung   zone linear atelectasis. Remaining study unchanged.    "Thank you for the opportunity to participate in the care of this   patient."    MONROE BAEZ M.D., RADIOLOGY RESIDENT  This document has been electronically signed.  MARCELL GARCIA M.D., ATTENDING RADIOLOGIST  This document has been electronically signed. Apr 4 2019  4:00PM    Echo-within normal limits

## 2019-04-04 NOTE — PROGRESS NOTE ADULT - SUBJECTIVE AND OBJECTIVE BOX
Heme/Onc Progress Note (Dr. García)    Interval History: Patient sitting out of bed in chair. Comfortable appearing, no new documented fevers. Patient denies any gross bleeding or bruising o/n.       Allergies    No Known Allergies    Intolerances    Medications:  MEDICATIONS  (STANDING):  anastrozole 1 milliGRAM(s) Oral daily  chlorhexidine 2% Cloths 1 Application(s) Topical <User Schedule>  enoxaparin Injectable 40 milliGRAM(s) SubCutaneous daily  ertapenem  IVPB 1000 milliGRAM(s) IV Intermittent every 24 hours  famotidine Injectable 20 milliGRAM(s) IV Push two times a day  magnesium sulfate  IVPB 2 Gram(s) IV Intermittent once  nystatin/triamcinolone Cream 1 Application(s) Topical every 12 hours  pantoprazole  Injectable 40 milliGRAM(s) IV Push daily  Parenteral Nutrition - Adult 1 Each TPN Continuous <Continuous>  potassium phosphate / sodium phosphate powder 1 Packet(s) Oral four times a day    MEDICATIONS  (PRN):  ibuprofen  Tablet. 600 milliGRAM(s) Oral every 6 hours PRN Mild Pain (1 - 3)    enoxaparin Injectable 40 milliGRAM(s) SubCutaneous daily    anastrozole 1 milliGRAM(s) Oral daily        PHYSICAL EXAM:    T(F): 99.3 (19 @ 13:54), Max: 99.3 (19 @ 13:54)  HR: 102 (19 @ 13:54) (79 - 102)  BP: 114/78 (19 @ 13:54) (98/56 - 124/72)  RR: 16 (19 @ 13:54) (16 - 17)  SpO2: 95% (19 @ 13:54) (95% - 98%)  Wt(kg): --    Daily     Daily     GEN: Pale, sitting in chair  HEENT:AT/NC   CVS:+S1, S2, RRR   LUNG: CTA b/L   GI: +BS, no ostomy outpt   EXT; no c/c/e, picc line, no local infx  NEURO: aaox3      Labs:                          7.0    5.51  )-----------( 231      ( 2019 13:04 )             23.0     CBC Full  -  ( 2019 13:04 )  WBC Count : 5.51 K/uL  RBC Count : 2.31 M/uL  Hemoglobin : 7.0 g/dL  Hematocrit : 23.0 %  Platelet Count - Automated : 231 K/uL  Mean Cell Volume : 99.6 fl  Mean Cell Hemoglobin : 30.3 pg  Mean Cell Hemoglobin Concentration : 30.4 gm/dL  Auto Neutrophil # : 3.48 K/uL  Auto Lymphocyte # : 1.33 K/uL  Auto Monocyte # : 0.53 K/uL  Auto Eosinophil # : 0.11 K/uL  Auto Basophil # : 0.01 K/uL  Auto Neutrophil % : 63.2 %  Auto Lymphocyte % : 24.1 %  Auto Monocyte % : 9.6 %  Auto Eosinophil % : 2.0 %  Auto Basophil % : 0.2 %        04-    142  |  96  |  16  ----------------------------<  265<H>  3.3<L>   |  35<H>  |  0.52    Ca    8.5      2019 13:04  Phos  2.1     -  Mg     1.6     -    TPro  6.4  /  Alb  3.1<L>  /  TBili  0.2  /  DBili  x   /  AST  47<H>  /  ALT  210<H>  /  AlkPhos  352<H>  -    Urinalysis Basic - ( 2019 02:16 )    Color: Yellow / Appearance: Clear / S.015 / pH: x  Gluc: x / Ketone: NEGATIVE  / Bili: Negative / Urobili: 0.2 E.U./dL   Blood: x / Protein: Trace mg/dL / Nitrite: POSITIVE   Leuk Esterase: Small / RBC: < 5 /HPF / WBC 5-10 /HPF   Sq Epi: x / Non Sq Epi: 0-5 /HPF / Bacteria: Present /HPF     MR Lumbar      1. No MR evidence of epidural abscess.  2. Diffuse central canal stenosis throughout the lumbar spine, greatest   at L4-L5 level, due to both acquired etiologies from facet arthropathy,   disc disease, and epidural lipomatosis as well as a congenital etiology   from short pedicle morphology.  3. Right foraminal-lateral disc protrusion on background of diffuse bulge   with greater right-sided foraminal narrowing and lateral recess stenosis   which may be affecting the traversing right L5 and/or foraminal right L4   nerve roots.  4. Diffuse disc bulges L2-L3, L4, L5-S1 levels.  5. Multilevel foraminal narrowing, as above.  6. Lumbar kyphosis, apex at L2-L3 level    Blood cxs +

## 2019-04-04 NOTE — PROGRESS NOTE ADULT - SUBJECTIVE AND OBJECTIVE BOX
GYN Progress Note    Patient seen at bedside.  she again had increased work of breathing overnight, likely due to the fluids from the antibiotics and the TPN.  currently on NC2L. No subjective complaints of CP or SOB.  she is eating. weaning TPN. joseph replaced yesterday per ID request.    Vital Signs Last 24 Hrs  T(C): 36.4 (04 Apr 2019 05:31), Max: 36.5 (03 Apr 2019 17:53)  T(F): 97.5 (04 Apr 2019 05:31), Max: 97.7 (03 Apr 2019 17:53)  HR: 79 (04 Apr 2019 05:31) (79 - 100)  BP: 124/72 (04 Apr 2019 05:31) (94/65 - 124/72)  RR: 17 (04 Apr 2019 05:31) (15 - 17)  SpO2: 98% (04 Apr 2019 05:31) (95% - 99%)    Physical Exam:  Gen: No Acute Distress  Pulm: +crackles, decreased breath sounds on R side  GI: soft, nontender, nondistended, ostomy  Ext: SCDs in place, no erythema, edema, redness, pain  skin: redness and slight skin breakage/irritation on sacrum    I&O's Summary    03 Apr 2019 07:01  -  04 Apr 2019 07:00  --------------------------------------------------------  IN: 1290 mL / OUT: 2950 mL / NET: -1660 mL      MEDICATIONS  (STANDING):  anastrozole 1 milliGRAM(s) Oral daily  enoxaparin Injectable 40 milliGRAM(s) SubCutaneous daily  famotidine Injectable 20 milliGRAM(s) IV Push two times a day  furosemide   Injectable 20 milliGRAM(s) IV Push once  meropenem  IVPB 1000 milliGRAM(s) IV Intermittent every 8 hours  meropenem  IVPB      nystatin/triamcinolone Cream 1 Application(s) Topical every 12 hours  pantoprazole  Injectable 40 milliGRAM(s) IV Push daily  Parenteral Nutrition - Adult 1 Each TPN Continuous <Continuous>    MEDICATIONS  (PRN):  ibuprofen  Tablet. 600 milliGRAM(s) Oral every 6 hours PRN Mild Pain (1 - 3)      LABS:                        7.2    9.50  )-----------( 217      ( 03 Apr 2019 02:09 )             23.9     04-03    135  |  105  |  13  ----------------------------<  289<H>  3.5   |  23  |  0.57    Ca    8.3<L>      03 Apr 2019 02:08  Phos  2.7     04-03  Mg     2.2     04-03    TPro  5.9<L>  /  Alb  2.8<L>  /  TBili  0.2  /  DBili  x   /  AST  153<H>  /  ALT  312<H>  /  AlkPhos  310<H>  04-03    EXAM:  XR CHEST PORTABLE URGENT 1V                          PROCEDURE DATE:  04/03/2019          INTERPRETATION:  XR CHEST PORTABLE URGENT 1V dated 4/3/2019 11:20 AM.    INDICATION: Shortness of breath. Respiratory distress. Sepsis.    COMPARISON: Chest radiograph 4/1/2019 5:04 AM. CT abdomen and pelvis   3/15/2019    FINDINGS:  Single AP view of the chest is submitted. Right chest port and right PICC   unchanged. Cardiomegaly. Small to moderate layering right pleural   effusion, increased fromprior study. Bibasilar atelectasis also mildly   increased. No pneumothorax. Thoracic dextroscoliosis.    IMPRESSION:  Increased mild to moderate layering right pleural effusion and bibasilar   atelectasis.

## 2019-04-04 NOTE — CONSULT NOTE ADULT - SUBJECTIVE AND OBJECTIVE BOX
********************************  Initial Consult Note   Pulmonology    Attending: Dr Joanne Fish  ********************************    INTERVAL HISTORY:    59 year old female with PMHx significant for Polio, Breast Cancer, Diabetes Mellitus, Endometrial Cancer stage 3, s/p exploratory laparotomy, enterolysis, SHAMIR, BSO, pelvic lymphadenectomy, low anterior resection mobilization of splenic flexure, end colostomy on 7/30/18 with recent admission (2/22-3/25) for management of rectovaginal fistula and CAUTI - now presenting from [facility] with reported fever 100.5 F. Patient found to be bacteremic with ESBL proteus mirabilis, with urine growing the same organism along with Enterococcus fecalis.     Patient with increased work of breathing overnight and yesterday morning (per patient), with brief oxygen requirement. CXR illustrated what appeared to be a R pleural effusion and bilateral atelectasis. Patient received IV Lasix x 2. ECHO was performed and was wnlm. Patient seen today - on room air, asymptomatic, without further episode of shortness of breath since last night.     Surgical History: Left breast lumpectomy, right knee surgery with screw placement 2001, 7/2018 exploratory laparotomy, enterolysis, SHAMIR, BSO, pelvic lymphadenectomy, low anterior resection mobilization of splenic flexure, end colostomy.  Family History: No relevant Family History   Social History: Lifelong non-smoker, no alcohol use.     REVIEW OF SYSTEMS:  As per HPI, otherwise negative for Constitutional, Eyes, Ears/Nose/Mouth/Throat, Neck, Cardiovascular, Respiratory, Gastrointestinal, Genitourinary, Skin, Endocrine, Musculoskeletal, Psychiatric, and Hematologic/Lymphatic.    MEDICATIONS:  anastrozole 1 milliGRAM(s) Oral daily  chlorhexidine 2% Cloths 1 Application(s) Topical <User Schedule>  enoxaparin Injectable 40 milliGRAM(s) SubCutaneous daily  ertapenem  IVPB 1000 milliGRAM(s) IV Intermittent every 24 hours  famotidine Injectable 20 milliGRAM(s) IV Push two times a day  ibuprofen  Tablet. 600 milliGRAM(s) Oral every 6 hours PRN  magnesium sulfate  IVPB 2 Gram(s) IV Intermittent once  nystatin/triamcinolone Cream 1 Application(s) Topical every 12 hours  pantoprazole  Injectable 40 milliGRAM(s) IV Push daily  Parenteral Nutrition - Adult 1 Each TPN Continuous <Continuous>  potassium phosphate / sodium phosphate powder 1 Packet(s) Oral four times a day    VITAL SIGNS:  T(C): 37.4 (04 Apr 2019 13:54), Max: 37.4 (04 Apr 2019 13:54)  T(F): 99.3 (04 Apr 2019 13:54), Max: 99.3 (04 Apr 2019 13:54)  HR: 102 (04 Apr 2019 13:54) (79 - 102)  BP: 114/78 (04 Apr 2019 13:54) (98/56 - 124/72)  BP(mean): --  RR: 16 (04 Apr 2019 13:54) (16 - 17)  SpO2: 95% (04 Apr 2019 13:54) (95% - 98%)    PHYSICAL EXAMINATION:  Constitutional: Patient with chemo-related alopecia. Otherwise, in NAD.   HEENT: PERRLA, Normal Range of eye movement with no complaint of diplopia, Normal Hearing  Neck: No LAD, No JVD  Back: Normal spine flexure, No CVA tenderness  Respiratory: Normal air entry  Cardiovascular: S1 and S2 present - no additional abnormal sounds or murmurs. Normal rhythm and rate of pulse.   Gastrointestinal: BS+, soft, NT/ND  Extremities: No peripheral edema  Vascular: 2+ peripheral pulses  Neurological: A/O x 3, CN V-XII grossly intact.  Psychiatric: Normal mood, normal affect  Musculoskeletal: 5/5 strength b/l upper and lower extremities  Skin: No rashes    LABS:                          7.0    5.51  )-----------( 231      ( 04 Apr 2019 13:04 )             23.0     04-04    142  |  96  |  16  ----------------------------<  265<H>  3.3<L>   |  35<H>  |  0.52    Ca    8.5      04 Apr 2019 13:04  Phos  2.1     04-04  Mg     1.6     04-04    TPro  6.4  /  Alb  3.1<L>  /  TBili  0.2  /  DBili  x   /  AST  47<H>  /  ALT  210<H>  /  AlkPhos  352<H>  04-04      RADIOLOGY & ADDITIONAL STUDIES:    < from: Xray Chest 1 View- PORTABLE-Urgent (04.03.19 @ 11:20) >  FINDINGS:  Single AP view of the chest is submitted. Right chest port and right PICC   unchanged. Cardiomegaly. Small to moderate layering right pleural   effusion, increased fromprior study. Bibasilar atelectasis also mildly   increased. No pneumothorax. Thoracic dextroscoliosis.  IMPRESSION:  Increased mild to moderate layering right pleural effusion and bibasilar   atelectasis.    POCUS (4/4/2019):   Anterior Chest:   R : Lung sliding demonstrated. Sub-pleural B lines suspicious for consolidation rather than overt pulmonary edema.    L : Lung sliding demonstrated.     Posterior Chest:   R: Small pleural effusion.

## 2019-04-04 NOTE — PROGRESS NOTE ADULT - ASSESSMENT
60yo F HD4 readmitted from [facility] with fever, discharged there for apprx 1 week after prolonged hospital stay, with stage IV endometrial adenocarcinoma s/p staging surgery '18 and 1 round of chemotherapy 2/19, previously admitted for management of symptomatic rectovaginal and enterovaginal fistulas 2/22-3/25. Last fever at 0855am 4/1. Experiences increased work of breathing overnight requiring supplemental o2, stable after lasix yesterday AM. Currently stable on regional floor.       1. ID: s/p vancomycin and Zosyn in ED, consulted ID today for the first time this hospital stay, rec d/c Zosyn and add meropenem, started 4/3, f/u repeat blood cx taken 4/3 from PICC  2. FEN/GI - TPN daily with daily labs- to be ordered in AM; replete electrolytes PRN, PICC placed 2/27, lipids every other day- to be ordered today with TPN; reg diet tolerated for comfort; decreasing TPN from 2000cc to 1300cc  overnight, will continue to decrease as patient is eating, ran TPN at 80cc/hr overnight, turning down now to 40cc/hr.  - elevated liver enzymes, continue to trend, GI consulted appreciate recs, RUQ sono performed  3. PAIN - no pain  4.  - joseph placed given urinary retention 3/16, per ID will remove and replace  - no current active leaking of stool  - perianal irritation consistent with possible fungal infection- switched to nystatin cream 3/23; apply to affected area  as needed   - CT abdomen/pelvis w/ IV and oral contrast performed 3/15 showed improved enterovaginal fistula, no longer contrast in vagina, improvement in disease  5. Endocrine- ISS, has not taken home metformin in >1 month, monitor FS; endocrine consulted last hospital stay for workup of possible DI which was negative per team   6. RESP - f/u ekg, LE dopplers, lasix 20mg just ordered, also bedside echo ordered  7. VTE prophylaxis - SCDs, ambulate as tolerated with PT, Lovenox 40qd, PT    8. Neuro- signed off, will call again regarding inpatient emg studies  9. GYN malignancy  - continue anastrozole for treatment  - consider discharge to [facility] to increase patient's performance status, pt desires to go home for private PT however will be unable to be cleared most likely due to extreme weakness.  9. Derm: monitor sacrum for s/s of pressure ulcer. encourage frequent changes in position and OOB during  the day.

## 2019-04-05 LAB
ALBUMIN SERPL ELPH-MCNC: 3 G/DL — LOW (ref 3.3–5)
ALP SERPL-CCNC: 383 U/L — HIGH (ref 40–120)
ALT FLD-CCNC: 198 U/L — HIGH (ref 10–45)
ANION GAP SERPL CALC-SCNC: 8 MMOL/L — SIGNIFICANT CHANGE UP (ref 5–17)
AST SERPL-CCNC: 96 U/L — HIGH (ref 10–40)
BASOPHILS # BLD AUTO: 0.01 K/UL — SIGNIFICANT CHANGE UP (ref 0–0.2)
BASOPHILS NFR BLD AUTO: 0.2 % — SIGNIFICANT CHANGE UP (ref 0–2)
BILIRUB SERPL-MCNC: 0.2 MG/DL — SIGNIFICANT CHANGE UP (ref 0.2–1.2)
BUN SERPL-MCNC: 12 MG/DL — SIGNIFICANT CHANGE UP (ref 7–23)
CALCIUM SERPL-MCNC: 9 MG/DL — SIGNIFICANT CHANGE UP (ref 8.4–10.5)
CHLORIDE SERPL-SCNC: 95 MMOL/L — LOW (ref 96–108)
CK SERPL-CCNC: 31 U/L — SIGNIFICANT CHANGE UP (ref 25–170)
CO2 SERPL-SCNC: 37 MMOL/L — HIGH (ref 22–31)
CREAT SERPL-MCNC: 0.43 MG/DL — LOW (ref 0.5–1.3)
EOSINOPHIL # BLD AUTO: 0.12 K/UL — SIGNIFICANT CHANGE UP (ref 0–0.5)
EOSINOPHIL NFR BLD AUTO: 2.6 % — SIGNIFICANT CHANGE UP (ref 0–6)
GLUCOSE SERPL-MCNC: 136 MG/DL — HIGH (ref 70–99)
HCT VFR BLD CALC: 23.4 % — LOW (ref 34.5–45)
HGB BLD-MCNC: 7 G/DL — CRITICAL LOW (ref 11.5–15.5)
IMM GRANULOCYTES NFR BLD AUTO: 0.9 % — SIGNIFICANT CHANGE UP (ref 0–1.5)
LYMPHOCYTES # BLD AUTO: 1.38 K/UL — SIGNIFICANT CHANGE UP (ref 1–3.3)
LYMPHOCYTES # BLD AUTO: 30.3 % — SIGNIFICANT CHANGE UP (ref 13–44)
MAGNESIUM SERPL-MCNC: 2.1 MG/DL — SIGNIFICANT CHANGE UP (ref 1.6–2.6)
MCHC RBC-ENTMCNC: 29.8 PG — SIGNIFICANT CHANGE UP (ref 27–34)
MCHC RBC-ENTMCNC: 29.9 GM/DL — LOW (ref 32–36)
MCV RBC AUTO: 99.6 FL — SIGNIFICANT CHANGE UP (ref 80–100)
MONOCYTES # BLD AUTO: 0.45 K/UL — SIGNIFICANT CHANGE UP (ref 0–0.9)
MONOCYTES NFR BLD AUTO: 9.9 % — SIGNIFICANT CHANGE UP (ref 2–14)
NEUTROPHILS # BLD AUTO: 2.56 K/UL — SIGNIFICANT CHANGE UP (ref 1.8–7.4)
NEUTROPHILS NFR BLD AUTO: 56.1 % — SIGNIFICANT CHANGE UP (ref 43–77)
NRBC # BLD: 0 /100 WBCS — SIGNIFICANT CHANGE UP (ref 0–0)
PHOSPHATE SERPL-MCNC: 2.8 MG/DL — SIGNIFICANT CHANGE UP (ref 2.5–4.5)
PLATELET # BLD AUTO: 207 K/UL — SIGNIFICANT CHANGE UP (ref 150–400)
POTASSIUM SERPL-MCNC: 3.9 MMOL/L — SIGNIFICANT CHANGE UP (ref 3.5–5.3)
POTASSIUM SERPL-SCNC: 3.9 MMOL/L — SIGNIFICANT CHANGE UP (ref 3.5–5.3)
PROT SERPL-MCNC: 6.3 G/DL — SIGNIFICANT CHANGE UP (ref 6–8.3)
RBC # BLD: 2.35 M/UL — LOW (ref 3.8–5.2)
RBC # FLD: 15.8 % — HIGH (ref 10.3–14.5)
SODIUM SERPL-SCNC: 140 MMOL/L — SIGNIFICANT CHANGE UP (ref 135–145)
WBC # BLD: 4.56 K/UL — SIGNIFICANT CHANGE UP (ref 3.8–10.5)
WBC # FLD AUTO: 4.56 K/UL — SIGNIFICANT CHANGE UP (ref 3.8–10.5)

## 2019-04-05 PROCEDURE — 99232 SBSQ HOSP IP/OBS MODERATE 35: CPT | Mod: GC

## 2019-04-05 PROCEDURE — 95910 NRV CNDJ TEST 7-8 STUDIES: CPT | Mod: 26

## 2019-04-05 PROCEDURE — 71260 CT THORAX DX C+: CPT | Mod: 26

## 2019-04-05 PROCEDURE — 99232 SBSQ HOSP IP/OBS MODERATE 35: CPT

## 2019-04-05 PROCEDURE — 95885 MUSC TST DONE W/NERV TST LIM: CPT | Mod: 26

## 2019-04-05 PROCEDURE — 74177 CT ABD & PELVIS W/CONTRAST: CPT | Mod: 26

## 2019-04-05 PROCEDURE — 99233 SBSQ HOSP IP/OBS HIGH 50: CPT

## 2019-04-05 RX ORDER — IOHEXOL 300 MG/ML
30 INJECTION, SOLUTION INTRAVENOUS ONCE
Qty: 0 | Refills: 0 | Status: COMPLETED | OUTPATIENT
Start: 2019-04-05 | End: 2019-04-05

## 2019-04-05 RX ORDER — IMMUNE GLOBULIN,GAMMA(IGG) 5 %
25 VIAL (ML) INTRAVENOUS DAILY
Qty: 0 | Refills: 0 | Status: COMPLETED | OUTPATIENT
Start: 2019-04-06 | End: 2019-04-10

## 2019-04-05 RX ADMIN — Medication 1 PACKET(S): at 05:24

## 2019-04-05 RX ADMIN — ENOXAPARIN SODIUM 40 MILLIGRAM(S): 100 INJECTION SUBCUTANEOUS at 11:11

## 2019-04-05 RX ADMIN — ANASTROZOLE 1 MILLIGRAM(S): 1 TABLET ORAL at 11:12

## 2019-04-05 RX ADMIN — IOHEXOL 30 MILLILITER(S): 300 INJECTION, SOLUTION INTRAVENOUS at 16:45

## 2019-04-05 RX ADMIN — FAMOTIDINE 20 MILLIGRAM(S): 10 INJECTION INTRAVENOUS at 05:24

## 2019-04-05 RX ADMIN — CHLORHEXIDINE GLUCONATE 1 APPLICATION(S): 213 SOLUTION TOPICAL at 05:28

## 2019-04-05 RX ADMIN — Medication 1 PACKET(S): at 11:12

## 2019-04-05 RX ADMIN — Medication 1 APPLICATION(S): at 22:41

## 2019-04-05 RX ADMIN — FAMOTIDINE 20 MILLIGRAM(S): 10 INJECTION INTRAVENOUS at 18:48

## 2019-04-05 RX ADMIN — Medication 1 APPLICATION(S): at 11:12

## 2019-04-05 RX ADMIN — ERTAPENEM SODIUM 120 MILLIGRAM(S): 1 INJECTION, POWDER, LYOPHILIZED, FOR SOLUTION INTRAMUSCULAR; INTRAVENOUS at 18:48

## 2019-04-05 RX ADMIN — PANTOPRAZOLE SODIUM 40 MILLIGRAM(S): 20 TABLET, DELAYED RELEASE ORAL at 11:12

## 2019-04-05 NOTE — PROGRESS NOTE ADULT - ASSESSMENT
60yo F PMHx polio, breast cancer, DM, Endometrial Cancer stage 3, s/p exploratory laparotomy, enterolysis, SHAMIR, BSO, pelvic lymphadenectomy, low anterior resection mobilization of splenic flexure, end colostomy on 7/30/18 with recent admission (2/22-3/25) for management of rectovaginal fistula and UTI, presented from [facility] with fever. Found to be bacteremic likely 2/2 to UTI vs PICC line infection. Blood cultures with Proteus mirabilis ESBL and Staphylococcus epidermidis (currently not treating).   Urine cultures showing 10,000 CFU/ml Enterococcus faecalis & Proteus mirabilis ESBL. [place] has been change. Plan to remove PICC line. Patient still bacteremic.       Recommend:  1. Continue with ertapenem 1g q24 for Proteus mirabilis ESBL, currently not treating Staphylococcus epidermidis  2. Plan for PICC line removal and will be sent for culture, will f/u culture   3. Importance of surveillance cultures explained to primary team several times (Resident &PA), last culture drawn on 4/3 (drawn from PICC line), will not be able to define therapy until negative culture (bacteremia is treated a total of 14d from the date of negative cultures) & patient will need a new PICC line for IV antibiotic therapy but in order to put a PICC line cultures need to be negative for 48hours. Please seek phlebotomy assistance for blood cultures.   5. f/u blood cultures and antibiotic sensitivity     ID team one will follow.

## 2019-04-05 NOTE — PROGRESS NOTE ADULT - SUBJECTIVE AND OBJECTIVE BOX
GYN PROGRESS NOTE PGY4    Patient evaluated at the bedside.   She is back on 2LNC.   The vital sign is not recorded but the patient states at 530am her o2 sat was 86% so they put her back on.  She has no subjective SOB.  Overall she looks well, clinically improved from yesterday's episode of shortness of breath.  She is off all fluids at this time, only receiving IVF with antibiotics, s/p TPN.    T(C): 36.7 (04-05-19 @ 05:59), Max: 37.4 (04-04-19 @ 13:54)  HR: 94 (04-05-19 @ 05:59) (87 - 102)  BP: 109/71 (04-05-19 @ 05:59) (109/71 - 129/80)  RR: 17 (04-05-19 @ 05:59) (16 - 17)  SpO2: 96% (04-05-19 @ 05:59) (95% - 98%)    GEN: patient appears well  LUNGS: no respiratory distress, crackles in R lower lung  ABD: soft, nt, nd, ostomy w/output  EXT: no calf tenderness        04-04 @ 07:01  -  04-05 @ 07:00  --------------------------------------------------------  IN: 770 mL / OUT: 3050 mL / NET: -2280 mL      MEDICATIONS  (STANDING):  anastrozole 1 milliGRAM(s) Oral daily  chlorhexidine 2% Cloths 1 Application(s) Topical <User Schedule>  enoxaparin Injectable 40 milliGRAM(s) SubCutaneous daily  ertapenem  IVPB 1000 milliGRAM(s) IV Intermittent every 24 hours  famotidine Injectable 20 milliGRAM(s) IV Push two times a day  nystatin/triamcinolone Cream 1 Application(s) Topical every 12 hours  pantoprazole  Injectable 40 milliGRAM(s) IV Push daily  potassium phosphate / sodium phosphate powder 1 Packet(s) Oral four times a day    MEDICATIONS  (PRN):  ibuprofen  Tablet. 600 milliGRAM(s) Oral every 6 hours PRN Mild Pain (1 - 3)

## 2019-04-05 NOTE — PROGRESS NOTE ADULT - SUBJECTIVE AND OBJECTIVE BOX
INTERVAL HPI/OVERNIGHT EVENTS:  Patient seen at bedside - at this time, she reports no further issue with breathing since the night prior. She was speaking full sentences on room air. Does report a chronic dry cough that she has had for months.     On further ROS, patient did not have complaint of: Headache, Blurred Vision. Dyspnea, Palpitations, Abdominal Pain, N/V, Weakness, Change in Sensation.     VITAL SIGNS:  T(F): 97.7 (04-05-19 @ 09:44)  HR: 80 (04-05-19 @ 09:44)  BP: 123/84 (04-05-19 @ 09:44)  RR: 17 (04-05-19 @ 09:44)  SpO2: 100% (04-05-19 @ 09:44)      Input & Output:  04-04-19 @ 07:01  -  04-05-19 @ 07:00  --------------------------------------------------------  IN: 770 mL / OUT: 3050 mL / NET: -2280 mL    04-05-19 @ 07:01  -  04-05-19 @ 12:14  --------------------------------------------------------  IN: 0 mL / OUT: 200 mL / NET: -200 mL      PHYSICAL EXAM:  Constitutional: Patient with chemo-related alopecia. Otherwise, in NAD.   HEENT: PERRLA, Normal Range of eye movement with no complaint of diplopia, Normal Hearing  Neck: No LAD, No JVD  Back: Normal spine flexure, No CVA tenderness  Respiratory: Normal air entry  Cardiovascular: S1 and S2 present - no additional abnormal sounds or murmurs. Normal rhythm and rate of pulse.   Gastrointestinal: BS+, soft, NT/ND  Extremities: No peripheral edema  Vascular: 2+ peripheral pulses  Neurological: A/O x 3, CN V-XII grossly intact.  Psychiatric: Normal mood, normal affect  Musculoskeletal: 5/5 strength b/l upper and lower extremities  Skin: No rashes    MEDICATIONS  (STANDING):  anastrozole 1 milliGRAM(s) Oral daily  chlorhexidine 2% Cloths 1 Application(s) Topical <User Schedule>  enoxaparin Injectable 40 milliGRAM(s) SubCutaneous daily  ertapenem  IVPB 1000 milliGRAM(s) IV Intermittent every 24 hours  famotidine Injectable 20 milliGRAM(s) IV Push two times a day  nystatin/triamcinolone Cream 1 Application(s) Topical every 12 hours  pantoprazole  Injectable 40 milliGRAM(s) IV Push daily    MEDICATIONS  (PRN):  ibuprofen  Tablet. 600 milliGRAM(s) Oral every 6 hours PRN Mild Pain (1 - 3)      Allergies  No Known Allergies  Intolerances      LABS:                        7.0    4.56  )-----------( 207      ( 05 Apr 2019 07:47 )             23.4     04-05    140  |  95<L>  |  12  ----------------------------<  136<H>  3.9   |  37<H>  |  0.43<L>    Ca    9.0      05 Apr 2019 07:46  Phos  2.8     04-05  Mg     2.1     04-05    TPro  6.3  /  Alb  3.0<L>  /  TBili  0.2  /  DBili  x   /  AST  96<H>  /  ALT  198<H>  /  AlkPhos  383<H>  04-05      RADIOLOGY & ADDITIONAL TESTS:  < from: Xray Chest 1 View- PORTABLE-Urgent (04.03.19 @ 11:20) >  FINDINGS:  Single AP view of the chest is submitted. Right chest port and right PICC   unchanged. Cardiomegaly. Small to moderate layering right pleural   effusion, increased fromprior study. Bibasilar atelectasis also mildly   increased. No pneumothorax. Thoracic dextroscoliosis.  IMPRESSION:  Increased mild to moderate layering right pleural effusion and bibasilar   atelectasis.    POCUS: Both R and L posterior chest w/ pleural effusion - R > L

## 2019-04-05 NOTE — DIETITIAN INITIAL EVALUATION ADULT. - NS AS NUTRI INTERV ED CONTENT
Discussed w/ pt small frequent meals, prioritizing protein options, ambulating as feasible, and sitting 90 degrees upright when eating.

## 2019-04-05 NOTE — PROGRESS NOTE ADULT - SUBJECTIVE AND OBJECTIVE BOX
Neuromuscular consultation     58 yo W with history of breast cancer (remote), diabetes mellitus, more recent diagnosis of endometrial cancer, admitted with bacteremia. Consultation requested for lower extremity weakness. Pt's history is variable, but most likely weakness started soon after hysterectomy last summer, although she states it has gotten worse recently. She has a history of polio in the past affecting the bilateral legs. She was told after surgery not to get out of bed due to large abdominal wound. Neuromuscular consultation     58 yo W with history of breast cancer (2009), diabetes mellitus, more recent diagnosis of endometrial cancer, admitted with bacteremia. Consultation requested for lower extremity weakness. Pt's history is variable, but most likely weakness started soon after hysterectomy last summer, although she states it has gotten worse recently. She has a history of polio in the past affecting the bilateral legs. She was told after surgery not to get out of bed due to large abdominal wound. Blood cultures currently growing proteus and coag negative staph.     Labs:  Hb 7, MCV ~100, Iron low, Ferritin high  B12, folate normal  Vit D 23  Prealbumin 14 - 18  AST/ALT 96 / 198    MRI L spine with severe central stenosis at L4/5 without definite cauda equina compression    Exam:  MS: awake, alert, follows commands, speech fluent  CN: EOMI, no ptosis, face symmetric, tongue midline  Motor: deltoids 4/5, biceps/triceps 4, finger ext, abduction and APB 4; Hip flexion 2, knee flex/ext 4-, ankle dorsiflexion 1  Sensory: vibration absent at ankles and fingers, reduced severely at knees, normal at shoulders  Reflexes: 1+ UE, absent LE  Gait: deferred due to weakness Neuromuscular consultation     58 yo W with history of breast cancer (2009), diabetes mellitus, more recent diagnosis of endometrial cancer, admitted with bacteremia. Consultation requested for lower extremity weakness. Pt's history is variable, but most likely weakness started soon after hysterectomy last summer, although she states it has gotten worse recently. She has a history of polio in the past affecting the bilateral legs. She was told after surgery not to get out of bed due to large abdominal wound. Blood cultures currently growing proteus and coag negative staph.     Labs:  Hb 7, MCV ~100, Iron low, Ferritin high  B12, folate normal  Vit D 23  Prealbumin 14 - 18  AST/ALT 96 / 198    MRI L spine with severe central stenosis at L4/5 without definite cauda equina compression    Exam:  MS: awake, alert, follows commands, speech fluent  CN: EOMI, no ptosis, face symmetric, tongue midline  Motor: deltoids 4/5, biceps/triceps 4, finger ext, abduction and APB 4; Hip flexion 2, knee flex/ext 4-, ankle dorsiflexion 1  Sensory: vibration absent at ankles and fingers, reduced severely at knees, normal at shoulders  Reflexes: 1+ UE, absent LE  Gait: deferred due to weakness    EMG showed demyelinating neuropathy of UE and likely residual effects of polio in LE

## 2019-04-05 NOTE — PROGRESS NOTE ADULT - SUBJECTIVE AND OBJECTIVE BOX
INFECTIOUS DISEASE CONSULT PROGRESS NOTE    INTERVAL HPI/OVERNIGHT EVENTS:    ACTIVE ANTIMICROBIALS/ANTIBIOTICS:  ertapenem  IVPB 1000 milliGRAM(s) IV Intermittent every 24 hours      VITAL SIGNS:  ICU Vital Signs Last 24 Hrs  T(C): 36.7 (05 Apr 2019 05:59), Max: 37.4 (04 Apr 2019 13:54)  T(F): 98 (05 Apr 2019 05:59), Max: 99.3 (04 Apr 2019 13:54)  HR: 94 (05 Apr 2019 05:59) (87 - 102)  BP: 109/71 (05 Apr 2019 05:59) (109/71 - 129/80)  BP(mean): --  ABP: --  ABP(mean): --  RR: 17 (05 Apr 2019 05:59) (16 - 17)  SpO2: 96% (05 Apr 2019 05:59) (95% - 98%)      PHYSICAL EXAM:    LABS:                        7.0    5.51  )-----------( 231      ( 04 Apr 2019 13:04 )             23.0       04-04    142  |  96  |  16  ----------------------------<  265<H>  3.3<L>   |  35<H>  |  0.52    Ca    8.5      04 Apr 2019 13:04  Phos  2.1     04-04  Mg     1.6     04-04    TPro  6.4  /  Alb  3.1<L>  /  TBili  0.2  /  DBili  x   /  AST  47<H>  /  ALT  210<H>  /  AlkPhos  352<H>  04-04          MICROBIOLOGY:    Culture - Blood (collected 04-03-19 @ 18:05)  Source: .Blood None  Gram Stain (04-04-19 @ 12:37):    Aerobic Bottle: Gram Negative Rods    Result called to and read back by_ WARNER Loza RN  04/04/2019 12:35:44  Preliminary Report (04-04-19 @ 12:37):    Culture in progress        RADIOLOGY & ADDITIONAL STUDIES: INFECTIOUS DISEASE CONSULT PROGRESS NOTE    INTERVAL HPI/OVERNIGHT EVENTS: No fevers and WBC WNL. Patient is awake and alert. Complains of generalized weakness. Denies any chest pain, shortness of breath, palpitations.     ACTIVE ANTIMICROBIALS/ANTIBIOTICS:  ertapenem  IVPB 1000 milliGRAM(s) IV Intermittent every 24 hours    VITAL SIGNS:  T(C): 36.7 (05 Apr 2019 05:59), Max: 37.4 (04 Apr 2019 13:54)  T(F): 98 (05 Apr 2019 05:59), Max: 99.3 (04 Apr 2019 13:54)  HR: 94 (05 Apr 2019 05:59) (87 - 102)  BP: 109/71 (05 Apr 2019 05:59) (109/71 - 129/80)  BP(mean): --  ABP: --  ABP(mean): --  RR: 17 (05 Apr 2019 05:59) (16 - 17)  SpO2: 96% (05 Apr 2019 05:59) (95% - 98%)    PHYSICAL EXAM:  Constitutional: pale, non-toxic, chronically ill appearing  Ear/Nose/Throat: no oral lesion, no sinus tenderness on percussion	  Neck:no JVD, no lymphadenopathy, supple  Respiratory: CTA heath, dec breath sounds at bases   Cardiovascular: S1S2 RRR, no murmurs  Gastrointestinal: distended, ostomy in place  Extremities: + edema. +PICC   Vascular: DP Pulse:	right normal; left normal    LABS:                        7.0    5.51  )-----------( 231      ( 04 Apr 2019 13:04 )             23.0       04-04    142  |  96  |  16  ----------------------------<  265<H>  3.3<L>   |  35<H>  |  0.52    Ca    8.5      04 Apr 2019 13:04  Phos  2.1     04-04  Mg     1.6     04-04    TPro  6.4  /  Alb  3.1<L>  /  TBili  0.2  /  DBili  x   /  AST  47<H>  /  ALT  210<H>  /  AlkPhos  352<H>  04-04      MICROBIOLOGY:    Culture - Blood (04.03.19 @ 18:05)    Gram Stain:   Aerobic Bottle: Gram Negative Rods  Result called to and read back byWARNER Crews RN  04/04/2019 12:35:44    Specimen Source: .Blood None    Culture Results:   Growth in aerobic bottle: Proteus species Identification and  susceptibility to follow.  Growth in aerobic bottle: Gram Positive Cocci Identification and  susceptibility to follow.  Result called to and read back by_ Ms. WARNERSylvia Loza RN  04/05/2019 08:40:28    Culture - Blood (04.01.19 @ 05:59)    Gram Stain:   Aerobic Bottle: Gram Negative Rods  Aerobic Bottle: Gram Positive Cocci in Clusters  Result called to and read back by_ Ms. SARA Comer RN  04/02/2019 09:36:43  ***Blood Panel PCR results on this specimen are available  approximately 3 hours after the Gram stain result.***  Gram stain, PCR, and/or culture results may not always  correspond due to difference in methodologies.  ************************************************************  This PCR assay was performed using [employer].  The following targets are tested for: Enterococcus,  vancomycin resistant enterococci, Listeria monocytogenes,  coagulase negative staphylococci, S. aureus,  methicillin resistant S. aureus, Streptococcus agalactiae  (Group B), S. pneumoniae, S. pyogenes (Group A),  Acinetobacter baumannii, Enterobacter cloacae, E. coli,  Klebsiella oxytoca, K. pneumoniae, Proteus sp.,  Serratia marcescens, Haemophilus influenzae,  Neisseria meningitidis, Pseudomonas aeruginosa, Candida  albicans, C. glabrata, C krusei, C parapsilosis,  C. tropicalis and the KPC resistance gene.  "Due to technical problems, Proteus sp. will Not be reported as part of  the BCID panel until further notice"    -  Ampicillin: R >16 These ampicillin results predict results for amoxicillin    -  Ampicillin/Sulbactam: R <=4/2    -  Cefazolin: R >16    -  Cefazolin: R <=4    -  Ciprofloxacin: R >2    -  Ceftriaxone: R >32 Enterobacter, Citrobacter, and Serratia may develop resistance during prolonged therapy    -  Clindamycin: S 0.5    -  Erythromycin: S <=0.25    -  Coagulase negative Staphylococcus: Detec    -  Vancomycin: S 2    -  Tobramycin: S 4    -  Trimethoprim/Sulfamethoxazole: R >2/38    -  Trimethoprim/Sulfamethoxazole: R >2/38    -  Gentamicin: S 2    -  Meropenem: S 0.032    -  Linezolid: S 2    -  Piperacillin/Tazobactam: R <=8    -  RIF- Rifampin: S <=1 Should not be used as monotherapy    -  Oxacillin: R >2    -  Penicillin: R >8    Specimen Source: .Blood Blood-Peripheral    Organism: Blood Culture PCR    Organism: Proteus mirabilis ESBL    Organism: Proteus mirabilis ESBL    Organism: Staphylococcus epidermidis    Culture Results:   Growth in aerobic bottle: Proteus mirabilis ESBL Floor previously  notified.  Growth in aerobic bottle: Staphylococcus epidermidis    Organism Identification: Proteus mirabilis ESBL  Proteus mirabilis ESBL  Blood Culture PCR  Staphylococcus epidermidis    Method Type: PCR    Method Type: RAJIV    Method Type: ETEST    Method Type: RAJIV      Culture - Urine (04.01.19 @ 08:21)    -  Gentamicin: R >8    -  Levofloxacin: S 1    -  Nitrofurantoin: R >64 Should not be used to treat pyelonephritis    -  Nitrofurantoin: S <=32 Should not be used to treat pyelonephritis.    -  Piperacillin/Tazobactam: R <=8    -  Tetra/Doxy: I 8    -  Tigecycline: S 2    -  Tobramycin: I 8    -  Trimethoprim/Sulfamethoxazole: R >2/38    -  Vancomycin: S 2    -  Amikacin: S <=8    -  Ampicillin: R >16 These ampicillin results predict results for amoxicillin    -  Ampicillin: S <=2 Predicts results to ampicillin/sulbactam, amoxacillin-clavulanate and  piperacillin-tazobactam.    -  Ampicillin/Sulbactam: R <=4/2    -  Aztreonam: S <=4    -  Cefazolin: R >16 For uncomplicated UTI with K. pneumoniae, E. coli, or P. mirablis: RAJIV <=16 is sensitive and RAJIV >=32 is resistant. This also predicts results for oral agents cefaclor, cefdinir, cefpodoxime, cefprozil, cefuroxime axetil, cephalexin and locarbef for uncomplicated UTI. Note that some isolates may be susceptible to these agents while testing resistant to cefazolin.    -  Ceftriaxone: R >32 Enterobacter, Citrobacter, and Serratia may develop resistance during prolonged therapy    -  Ciprofloxacin: R >2    -  Ciprofloxacin: S <=1    Specimen Source: .Urine Clean Catch (Midstream)    Culture Results:   10,000 CFU/ml Enterococcus faecalis  50,000 CFU/ml Proteus mirabilis ESBL  Result called to and read back byJavier Holliday MD  04/04/2019 12:15:58    Organism Identification: Proteus mirabilis ESBL  Proteus mirabilis ESBL  Enterococcus faecalis    Organism: Proteus mirabilis ESBL    Organism: Proteus mirabilis ESBL    Organism: Enterococcus faecalis    Method Type: RAJIV    Method Type: RAJIV    Method Type: ETEST

## 2019-04-05 NOTE — PROGRESS NOTE ADULT - ATTENDING COMMENTS
PICC d/c'd today. Patient will need surveillance blood cultures (pending collection) to be negative X 48 hours before PICC can be replaced. Continue ertapenem.

## 2019-04-05 NOTE — PROGRESS NOTE ADULT - ASSESSMENT
59 year old female with PMHx significant for Polio, Breast Cancer, Diabetes Mellitus, Endometrial Cancer stage 3, s/p exploratory laparotomy, enterolysis, SHAMIR, BSO, pelvic lymphadenectomy, low anterior resection mobilization of splenic flexure, end colostomy on 7/30/18 with recent admission (2/22-3/25) for management of rectovaginal fistula and CAUTI - now presenting from [facility] with reported fever 100.5 F. Patient found to be bacteremic with ESBL proteus mirabilis, with urine growing the same organism along with Enterococcus fecalis.     Pulmonology consulted for evaluation of shortness breath and R pleural effusion.

## 2019-04-05 NOTE — DIETITIAN INITIAL EVALUATION ADULT. - OTHER INFO
60yo F HD5 readmitted from [facility] with fever, was d/c'd to  there for apprx 1 week after prolonged hospital stay, w/ stage IV endometrial adenocarcinoma s/p staging surgery '18 and 1 round of chemotherapy 2/19. Pt was previously admitted for management of symptomatic rectovaginal and enterovaginal fistulas 2/22-3/25. CT A/P w/ IV and PO contrast (3/15) showedimproved enterovaginal fistula, no longer contrast in vagina. TPN d/c'd yesterday. Ordered for regular diet and tolerating PO. Pt reported having 1/2 milk and cereal, 1/2 egg, some bites of hash browns. Denies N/V. Experiencing early satiety and bloatedness. Also having acid reflux after meals likely d/t reclining in bed while eating. Discussed w/ pt small frequent meals, prioritizing protein options, ambulating as feasible, and sitting 90 degrees upright when eating. Pt receptive and agreeable to all. Consider adding on ONS if intake <50% at meals. Consider antacid if acid reflux persists. Wt appears stable since last admission indicating PN infusion was adequate. Please continue to trend. NKFA or dietary restrictions. Skin: carmen score 19; GI colostomy per flowsheet. RD to follow.

## 2019-04-05 NOTE — DIETITIAN INITIAL EVALUATION ADULT. - ENERGY NEEDS
Height: 5'1" Weight: 134lbs, IBW 105lbs+/-10%, %%, BMI 25.5  IBW used for calculations as pt >120% of IBW   Nutrient needs based on [facility] standards of care for maintenance in adults.   Needs adjusted for catabolic illness and chemo. Recommend utilizing upper range of estimated needs.

## 2019-04-05 NOTE — DIETITIAN INITIAL EVALUATION ADULT. - NS AS NUTRI INTERV MEALS SNACK
Continue on regular diet. Recommend adding EnsureEnlives BID (700kcal, 40g pro) to have between meals./General/healthful diet Continue on regular diet. Recommend adding Glucerna TID (660kcal, 30g pro) to have between meals./General/healthful diet

## 2019-04-05 NOTE — PROGRESS NOTE ADULT - ASSESSMENT
Combination of subacute weakness and demyelinating neuropathy on EMG raises suspicion for CIDP-spectrum disorder (although does not fulfill criteria for CIDP, the study was limited by multiple factors that limit its sensitivity).   Recommend the following approach:  -Check other potential nutritional causes of neuropathy - B1, B6, copper, vitamin E  -Check CK and aldolase  -Check systemic autoimmune workup - MONIKA, dsDNA, Sjogren's antibodies, SPEP, immunofixation, ganglioside antibodies, anti-MAG antibody, MARIO, and paraneoplastic panel  -Start thiamine (b1) supplementation after drawing B1 serum level at 500mg IV daily  - Combination of subacute weakness and demyelinating neuropathy on EMG raises suspicion for CIDP-spectrum disorder (although does not fulfill criteria for CIDP, the study was limited by multiple factors that limit its sensitivity).     Recommend the following approach:  -Check other potential nutritional causes of neuropathy - B1, B6, copper, vitamin E  -Check CK and aldolase  -Check systemic autoimmune workup - MONIKA, dsDNA, Sjogren's antibodies, SPEP, immunofixation, ganglioside antibodies, anti-MAG antibody, MARIO, and paraneoplastic panel  -Start thiamine (b1) supplementation after drawing B1 serum level at 500mg IV daily  -If there is no contraindication, recommend starting IVIG at 0.4gm/kg/day x 5 days, after labs above are drawn to avoid false positives  -Given that her blood type is A+, and she is already fairly anemic, please order privigen IVIG formulation (not gammagard) to avoid possible effect of hemolytic anemia

## 2019-04-05 NOTE — PROGRESS NOTE ADULT - ASSESSMENT
58yo F HD4 with stage IV endometrial adenocarcinoma s/p staging surgery '18 and 1 round of chemotherapy 2/19 readmitted from [facility] with fever previously admitted for management of symptomatic rectovaginal and enterovaginal fistulas 2/22-3/25 now bacteremic. Last fever at 0855am 4/1. Work of breathing improved from yesterday. Pending CT chest.        1. Neuro: no complaints of pain- Motrin prn   2. Pulm -Chest x-ray today reveals "mild interval worsening of a small to moderate layering right pleural effusion, right basilar consolidation/atelectasis and right middle lung zone linear atelectasis." Follow up pulmonology consult- pending CT chest, abdomen and pelvis    Lower extremity dopplers negative  3. Cardio- Echo: within normal limits   4. FEN/GI - Reg diet. s/p TPN. PICC line removed today- follow up PICC tip culture   - elevated liver enzymes now improving- GI consulted appreciate recs, RUQ sono- gallbladder wall thickening and sludge- no gallstones   - No current active leaking of stool  5.  - [place] placed given urinary retention 3/16  - Perianal irritation consistent with possible fungal infection- continue nystatin cream 3/23; apply to affected area as needed   - CT abdomen/pelvis w/ IV and oral contrast performed 3/15 showed improved enterovaginal fistula, no longer contrast in vagina, improvement in disease  6. ID: Bacteremia- Blood culture 4/1 positive for gram neg rods, coag neg staph, proteus. Blood culture from PICC 4/3 positive for gram neg rods. Follow up PICC culture    ID following- Ertapenem  s/p Vancomycin, Zosyn (4/1-4/3), and Meropenem (4/3-4/4)  7. Endocrine- ISS, has not taken home metformin in >1 month, monitor FS; endocrine consulted last hospital stay for workup of possible DI which was negative per team   8. VTE prophylaxis - SCDs, ambulate as tolerated, Lovenox 40 daily, PT following    9. Pt has hx of polio with weakness- Follow up neurology recommendations   10. GYN malignancy  - continue anastrozole for treatment  - consider discharge to [facility] to increase patient's performance status, pt desires to go home for private PT however will be unable to be cleared most likely due to extreme weakness.  11. Derm: monitor sacrum for s/s of pressure ulcer. encourage frequent changes in position and OOB during  the day.

## 2019-04-05 NOTE — PROGRESS NOTE ADULT - SUBJECTIVE AND OBJECTIVE BOX
Pt seen and examined at bedside. Pt states her breathing has improved and she has been using supplemental oxygen intermittently. She has got up out of bed to chair today and is tolerating regular diet. She denies any stool leakage from vagina.   Pt denies fever, chills, chest pain, SOB, nausea, vomiting, lightheadedness, or dizziness.      T(F): 98.9 (04-05-19 @ 16:38), Max: 98.9 (04-05-19 @ 16:38)  HR: 72 (04-05-19 @ 14:30) (72 - 94)  BP: 136/86 (04-05-19 @ 16:38) (109/71 - 136/86)  RR: 17 (04-05-19 @ 16:38) (17 - 18)  SpO2: 89% (04-05-19 @ 16:38) (89% - 100%)  Wt(kg): --  I&O's Summary    04 Apr 2019 07:01  -  05 Apr 2019 07:00  --------------------------------------------------------  IN: 770 mL / OUT: 3050 mL / NET: -2280 mL    05 Apr 2019 07:01  -  05 Apr 2019 18:34  --------------------------------------------------------  IN: 0 mL / OUT: 500 mL / NET: -500 mL    MEDICATIONS  (STANDING):  anastrozole 1 milliGRAM(s) Oral daily  chlorhexidine 2% Cloths 1 Application(s) Topical <User Schedule>  enoxaparin Injectable 40 milliGRAM(s) SubCutaneous daily  ertapenem  IVPB 1000 milliGRAM(s) IV Intermittent every 24 hours  famotidine Injectable 20 milliGRAM(s) IV Push two times a day  immune   globulin gamma IVPB 25 Gram(s) IV Intermittent daily  nystatin/triamcinolone Cream 1 Application(s) Topical every 12 hours  pantoprazole  Injectable 40 milliGRAM(s) IV Push daily    MEDICATIONS  (PRN):  ibuprofen  Tablet. 600 milliGRAM(s) Oral every 6 hours PRN Mild Pain (1 - 3)    Physical Exam:  Constitutional: NAD  Pulmonary: improved breathe sounds R lung base with less crackles   Cardiovascular: Regular rate and rhythm   Abdomen: Soft, nontender, nondistended, no guarding, no rebound. Ostomy intact with brown stool output   Extremities: no lower extremity edema or calve tenderness. SCDs in place   Right PICC line C/D/I- skin around is nonerythematous, nontender      LABS:                        7.0    4.56  )-----------( 207      ( 05 Apr 2019 07:47 )             23.4     04-05    140  |  95<L>  |  12  ----------------------------<  136<H>  3.9   |  37<H>  |  0.43<L>    Ca    9.0      05 Apr 2019 07:46  Phos  2.8     04-05  Mg     2.1     04-05    TPro  6.3  /  Alb  3.0<L>  /  TBili  0.2  /  DBili  x   /  AST  96<H>  /  ALT  198<H>  /  AlkPhos  383<H>  04-05          RADIOLOGY & ADDITIONAL TESTS:

## 2019-04-05 NOTE — PROGRESS NOTE ADULT - PROBLEM SELECTOR PLAN 1
At time of assessment, patient was on room air and without subjective or objective evidence of shortness of breath/respiratory difficulty.   - CXR does illustrate what is read as mild-moderate R sided pleural effusion, however, on POC Ultrasound examination, the R sided fluid collection is minimal - no indication for thoracentesis.  - Continue with antibiotic therapy at this time per ID reccs  - Effusion unlikely to be the reason for the dyspnea  - Recommend patient to be OOB and ambulate as tolerated  - Patient educated on incentive spirometer use  - Recommend a CT chest to further evaluate intraparenchymal process (given e/o sub-pleural B-line anterior chest)  - Thank you for the consult, we will continue to follow. At time of assessment, patient was on room air and without subjective or objective evidence of shortness of breath/respiratory difficulty.   - CXR does illustrate what is read as mild-moderate R sided pleural effusion, however, on POC Ultrasound examination, the R sided fluid collection is minimal, unchanged on US on 4/5  - no indication for thoracentesis.  - Continue with antibiotic therapy at this time per ID reccs  - Effusion unlikely to be the reason for the dyspnea  - Recommend patient to be OOB and ambulate as tolerated  - Patient educated on incentive spirometer use  - Recommend a CT chest to further evaluate intraparenchymal process (given e/o sub-pleural B-line anterior chest)  - Thank you for the consult, we will continue to follow.

## 2019-04-05 NOTE — PROGRESS NOTE ADULT - ASSESSMENT
60yo F HD5 readmitted from [facility] with fever, discharged there for apprx 1 week after prolonged hospital stay, with stage IV endometrial adenocarcinoma s/p staging surgery '18 and 1 round of chemotherapy 2/19, previously admitted for management of symptomatic rectovaginal and enterovaginal fistulas 2/22-3/25. Last fever at 0855am 4/1. Experiences increased work of breathing overnight requiring supplemental o2, stable after lasix the past 2 mornings. table on regional floor.       1. ID: s/p vancomycin and Zosyn in ED, consulted ID today for the first time this hospital stay, rec d/c Zosyn and add meropenem, started 4/3, f/u repeat blood cx taken 4/3 from PICC  2. FEN/GI - TPN daily with daily labs- to be ordered in AM; replete electrolytes PRN, PICC placed 2/27, lipids every other day- to be ordered today with TPN; reg diet tolerated for comfort; decreasing TPN from 2000cc to 1300cc  overnight, will continue to decrease as patient is eating, ran TPN at 80cc/hr overnight, turning down now to 40cc/hr.  - elevated liver enzymes, continue to trend, GI consulted appreciate recs, RUQ sono performed  3. PAIN - no pain  4.  - joseph placed given urinary retention 3/16, per ID will remove and replace  - no current active leaking of stool  - perianal irritation consistent with possible fungal infection- switched to nystatin cream 3/23; apply to affected area  as needed   - CT abdomen/pelvis w/ IV and oral contrast performed 3/15 showed improved enterovaginal fistula, no longer contrast in vagina, improvement in disease  5. Endocrine- ISS, has not taken home metformin in >1 month, monitor FS; endocrine consulted last hospital stay for workup of possible DI which was negative per team   6. RESP - f/u ekg, LE dopplers, lasix 20mg just ordered, also bedside echo ordered  7. VTE prophylaxis - SCDs, ambulate as tolerated with PT, Lovenox 40qd, PT    8. Neuro- signed off, will call again regarding inpatient emg studies  9. GYN malignancy  - continue anastrozole for treatment  - consider discharge to [facility] to increase patient's performance status, pt desires to go home for private PT however will be unable to be cleared most likely due to extreme weakness.  9. Derm: monitor sacrum for s/s of pressure ulcer. encourage frequent changes in position and OOB during  the day. 60yo F HD5 readmitted from [facility] with fever, discharged there for apprx 1 week after prolonged hospital stay, with stage IV endometrial adenocarcinoma s/p staging surgery '18 and 1 round of chemotherapy 2/19, previously admitted for management of symptomatic rectovaginal and enterovaginal fistulas 2/22-3/25. Last fever at 0855am 4/1. Experiences increased work of breathing overnight requiring supplemental o2, stable after lasix the past 2 mornings. table on regional floor.       1. ID: s/p vancomycin and Zosyn in ED, ID following, rec d/c Zosyn and add meropenem (4/3) then changed to ertapenem, started 4/4, f/u repeat blood cx taken 4/3 from PICC. removing PICC line this AM and will send for culture. abx 2 weeks.  2. FEN/GI - stopping TPN today after discussion with nutrition and TPN pharmacist, patient now tolerating regular diet; replete electrolytes PRN, PICC placed 2/27 but will remove today due to +cultures  - elevated liver enzymes, continue to trend, GI consulted appreciate recs, RUQ sono performed  3. PAIN - no pain  4.  - joseph placed given urinary retention 3/16, per ID removed and replaced  - no current active leaking of stool  - perianal irritation consistent with possible fungal infection- switched to nystatin cream 3/23; apply to affected area prn  - CT abdomen/pelvis w/ IV and oral contrast performed 3/15 showed improved enterovaginal fistula, no longer contrast in vagina, improvement in disease  5. Endocrine - ISS, has not taken home metformin in >1 month, monitor FS; endocrine consulted last hospital stay for workup of possible DI which was negative per team. Monitor FS after stopping TPN.  6. RESP - on 02 currently because desaturates to 95% when off 2LNC, no lasix needed at this time, will get patient OOB and get chest CT this AM  7. VTE prophylaxis - SCDs, ambulate as tolerated with PT, Lovenox 40qd   8. Neuro- signed off, attending to attending call regarding inpatient emg studies  9. GYN malignancy  - continue anastrozole for treatment  - consider discharge to [facility] to increase patient's performance status, pt desires to go home for private PT however will be unable to be cleared most likely due to extreme weakness.  9. Derm: monitor sacrum for s/s of pressure ulcer. encourage frequent changes in position and OOB during  the day.

## 2019-04-06 LAB
-  AMPICILLIN/SULBACTAM: SIGNIFICANT CHANGE UP
-  AMPICILLIN: SIGNIFICANT CHANGE UP
-  CEFAZOLIN: SIGNIFICANT CHANGE UP
-  CEFTRIAXONE: SIGNIFICANT CHANGE UP
-  CIPROFLOXACIN: SIGNIFICANT CHANGE UP
-  GENTAMICIN: SIGNIFICANT CHANGE UP
-  PIPERACILLIN/TAZOBACTAM: SIGNIFICANT CHANGE UP
-  TOBRAMYCIN: SIGNIFICANT CHANGE UP
-  TRIMETHOPRIM/SULFAMETHOXAZOLE: SIGNIFICANT CHANGE UP
ANION GAP SERPL CALC-SCNC: 13 MMOL/L — SIGNIFICANT CHANGE UP (ref 5–17)
BASOPHILS # BLD AUTO: 0.02 K/UL — SIGNIFICANT CHANGE UP (ref 0–0.2)
BASOPHILS NFR BLD AUTO: 0.3 % — SIGNIFICANT CHANGE UP (ref 0–2)
BUN SERPL-MCNC: 11 MG/DL — SIGNIFICANT CHANGE UP (ref 7–23)
CALCIUM SERPL-MCNC: 9.5 MG/DL — SIGNIFICANT CHANGE UP (ref 8.4–10.5)
CHLORIDE SERPL-SCNC: 96 MMOL/L — SIGNIFICANT CHANGE UP (ref 96–108)
CO2 SERPL-SCNC: 33 MMOL/L — HIGH (ref 22–31)
CREAT SERPL-MCNC: 0.47 MG/DL — LOW (ref 0.5–1.3)
CULTURE RESULTS: SIGNIFICANT CHANGE UP
EOSINOPHIL # BLD AUTO: 0.25 K/UL — SIGNIFICANT CHANGE UP (ref 0–0.5)
EOSINOPHIL NFR BLD AUTO: 3.2 % — SIGNIFICANT CHANGE UP (ref 0–6)
GLUCOSE SERPL-MCNC: 128 MG/DL — HIGH (ref 70–99)
HCT VFR BLD CALC: 25.4 % — LOW (ref 34.5–45)
HGB BLD-MCNC: 7.7 G/DL — LOW (ref 11.5–15.5)
IMM GRANULOCYTES NFR BLD AUTO: 0.8 % — SIGNIFICANT CHANGE UP (ref 0–1.5)
LYMPHOCYTES # BLD AUTO: 1.83 K/UL — SIGNIFICANT CHANGE UP (ref 1–3.3)
LYMPHOCYTES # BLD AUTO: 23.4 % — SIGNIFICANT CHANGE UP (ref 13–44)
MAGNESIUM SERPL-MCNC: 1.7 MG/DL — SIGNIFICANT CHANGE UP (ref 1.6–2.6)
MCHC RBC-ENTMCNC: 30.1 PG — SIGNIFICANT CHANGE UP (ref 27–34)
MCHC RBC-ENTMCNC: 30.3 GM/DL — LOW (ref 32–36)
MCV RBC AUTO: 99.2 FL — SIGNIFICANT CHANGE UP (ref 80–100)
METHOD TYPE: SIGNIFICANT CHANGE UP
MONOCYTES # BLD AUTO: 0.52 K/UL — SIGNIFICANT CHANGE UP (ref 0–0.9)
MONOCYTES NFR BLD AUTO: 6.6 % — SIGNIFICANT CHANGE UP (ref 2–14)
NEUTROPHILS # BLD AUTO: 5.15 K/UL — SIGNIFICANT CHANGE UP (ref 1.8–7.4)
NEUTROPHILS NFR BLD AUTO: 65.7 % — SIGNIFICANT CHANGE UP (ref 43–77)
NRBC # BLD: 0 /100 WBCS — SIGNIFICANT CHANGE UP (ref 0–0)
ORGANISM # SPEC MICROSCOPIC CNT: SIGNIFICANT CHANGE UP
PHOSPHATE SERPL-MCNC: 4 MG/DL — SIGNIFICANT CHANGE UP (ref 2.5–4.5)
PLATELET # BLD AUTO: 308 K/UL — SIGNIFICANT CHANGE UP (ref 150–400)
POTASSIUM SERPL-MCNC: 3.8 MMOL/L — SIGNIFICANT CHANGE UP (ref 3.5–5.3)
POTASSIUM SERPL-SCNC: 3.8 MMOL/L — SIGNIFICANT CHANGE UP (ref 3.5–5.3)
PROT SERPL-MCNC: 5.9 G/DL — LOW (ref 6–8.3)
PROT SERPL-MCNC: 5.9 G/DL — LOW (ref 6–8.3)
RBC # BLD: 2.56 M/UL — LOW (ref 3.8–5.2)
RBC # FLD: 15.4 % — HIGH (ref 10.3–14.5)
SODIUM SERPL-SCNC: 142 MMOL/L — SIGNIFICANT CHANGE UP (ref 135–145)
SPECIMEN SOURCE: SIGNIFICANT CHANGE UP
WBC # BLD: 7.83 K/UL — SIGNIFICANT CHANGE UP (ref 3.8–10.5)
WBC # FLD AUTO: 7.83 K/UL — SIGNIFICANT CHANGE UP (ref 3.8–10.5)

## 2019-04-06 PROCEDURE — 99231 SBSQ HOSP IP/OBS SF/LOW 25: CPT

## 2019-04-06 RX ORDER — MAGNESIUM SULFATE 500 MG/ML
2 VIAL (ML) INJECTION ONCE
Qty: 0 | Refills: 0 | Status: COMPLETED | OUTPATIENT
Start: 2019-04-06 | End: 2019-04-06

## 2019-04-06 RX ORDER — POTASSIUM CHLORIDE 20 MEQ
20 PACKET (EA) ORAL ONCE
Qty: 0 | Refills: 0 | Status: COMPLETED | OUTPATIENT
Start: 2019-04-06 | End: 2019-04-06

## 2019-04-06 RX ADMIN — FAMOTIDINE 20 MILLIGRAM(S): 10 INJECTION INTRAVENOUS at 07:04

## 2019-04-06 RX ADMIN — PANTOPRAZOLE SODIUM 40 MILLIGRAM(S): 20 TABLET, DELAYED RELEASE ORAL at 11:49

## 2019-04-06 RX ADMIN — Medication 62.5 GRAM(S): at 11:48

## 2019-04-06 RX ADMIN — CHLORHEXIDINE GLUCONATE 1 APPLICATION(S): 213 SOLUTION TOPICAL at 07:04

## 2019-04-06 RX ADMIN — Medication 20 MILLIEQUIVALENT(S): at 17:59

## 2019-04-06 RX ADMIN — ENOXAPARIN SODIUM 40 MILLIGRAM(S): 100 INJECTION SUBCUTANEOUS at 11:49

## 2019-04-06 RX ADMIN — ERTAPENEM SODIUM 120 MILLIGRAM(S): 1 INJECTION, POWDER, LYOPHILIZED, FOR SOLUTION INTRAMUSCULAR; INTRAVENOUS at 19:54

## 2019-04-06 RX ADMIN — Medication 1 APPLICATION(S): at 21:51

## 2019-04-06 RX ADMIN — ANASTROZOLE 1 MILLIGRAM(S): 1 TABLET ORAL at 11:49

## 2019-04-06 RX ADMIN — Medication 1 APPLICATION(S): at 11:47

## 2019-04-06 RX ADMIN — Medication 50 GRAM(S): at 21:55

## 2019-04-06 RX ADMIN — FAMOTIDINE 20 MILLIGRAM(S): 10 INJECTION INTRAVENOUS at 17:57

## 2019-04-06 NOTE — PROGRESS NOTE ADULT - SUBJECTIVE AND OBJECTIVE BOX
GYN Progress Note    Patient seen at bedside.  Pain controlled.  Did not get a food tray last night but has good appeitite.  Has not been OOB 2/2 to weakness. Westbrook in place.    Denies CP, palpitations, SOB, fever, chills, nausea, vomiting.    Vital Signs Last 24 Hrs  T(C): 36.6 (06 Apr 2019 05:16), Max: 37.2 (05 Apr 2019 16:38)  T(F): 97.8 (06 Apr 2019 05:16), Max: 98.9 (05 Apr 2019 16:38)  HR: 78 (06 Apr 2019 05:16) (72 - 81)  BP: 143/89 (06 Apr 2019 05:16) (123/84 - 143/89)  BP(mean): --  RR: 16 (06 Apr 2019 05:16) (16 - 18)  SpO2: 99% (06 Apr 2019 05:16) (89% - 100%)    Physical Exam:  Gen: No Acute Distress  Pulm: Normal work of breathing, on 2L NC  GI: soft, nontender, nondistended, +BS, no rebound, no guarding; ostomy with air and stool output  Ext: SCDs in place, wnl    I&O's Summary    05 Apr 2019 07:01  -  06 Apr 2019 07:00  --------------------------------------------------------  IN: 0 mL / OUT: 1200 mL / NET: -1200 mL      MEDICATIONS  (STANDING):  anastrozole 1 milliGRAM(s) Oral daily  chlorhexidine 2% Cloths 1 Application(s) Topical <User Schedule>  enoxaparin Injectable 40 milliGRAM(s) SubCutaneous daily  ertapenem  IVPB 1000 milliGRAM(s) IV Intermittent every 24 hours  famotidine Injectable 20 milliGRAM(s) IV Push two times a day  immune   globulin gamma IVPB 25 Gram(s) IV Intermittent daily  nystatin/triamcinolone Cream 1 Application(s) Topical every 12 hours  pantoprazole  Injectable 40 milliGRAM(s) IV Push daily    MEDICATIONS  (PRN):  ibuprofen  Tablet. 600 milliGRAM(s) Oral every 6 hours PRN Mild Pain (1 - 3)      LABS:                        7.0    4.56  )-----------( 207      ( 05 Apr 2019 07:47 )             23.4     04-05    140  |  95<L>  |  12  ----------------------------<  136<H>  3.9   |  37<H>  |  0.43<L>    Ca    9.0      05 Apr 2019 07:46  Phos  2.8     04-05  Mg     2.1     04-05    TPro  6.3  /  Alb  3.0<L>  /  TBili  0.2  /  DBili  x   /  AST  96<H>  /  ALT  198<H>  /  AlkPhos  383<H>  04-05

## 2019-04-06 NOTE — PROGRESS NOTE ADULT - SUBJECTIVE AND OBJECTIVE BOX
GYN Progress Note    Patient seen at bedside, sitting calmly in chair. Reports cough has improved and has not had coughing since this morning. Denies any pain. Tolerating regular diet without any nausea/vomiting. Denies any stool leaking per vagina.  Has moved out of bed today, but has not tried ambulating today.  Westbrook remains in place.   Denies CP, palpitations, SOB, fever, chills, nausea, vomiting.    Vital Signs Last 24 Hrs  T(C): 36.4 (06 Apr 2019 16:36), Max: 37.1 (06 Apr 2019 13:09)  T(F): 97.6 (06 Apr 2019 16:36), Max: 98.8 (06 Apr 2019 13:09)  HR: 91 (06 Apr 2019 16:36) (78 - 91)  BP: 141/86 (06 Apr 2019 16:36) (132/83 - 143/89)  BP(mean): --  RR: 18 (06 Apr 2019 16:36) (16 - 18)  SpO2: 96% (06 Apr 2019 16:36) (94% - 99%)    Physical Exam:  Gen: No Acute Distress, resting comfortable in bed  Pulm: Normal work of breathing, no wheezes/rhonchi/rales   GI: soft, non tender, nondistended, +BS, no rebound, no guarding, ostomy with air and stool output  : no stool leaking   Ext: no edema/erythema/tenderness, left peripheral IV in place, previous left peripheral IV site ecchymosis, no erythema/warmth     I&O's Summary    05 Apr 2019 07:01  -  06 Apr 2019 07:00  --------------------------------------------------------  IN: 0 mL / OUT: 1200 mL / NET: -1200 mL    06 Apr 2019 07:01  -  06 Apr 2019 17:49  --------------------------------------------------------  IN: 830 mL / OUT: 425 mL / NET: 405 mL      MEDICATIONS  (STANDING):  anastrozole 1 milliGRAM(s) Oral daily  chlorhexidine 2% Cloths 1 Application(s) Topical <User Schedule>  enoxaparin Injectable 40 milliGRAM(s) SubCutaneous daily  ertapenem  IVPB 1000 milliGRAM(s) IV Intermittent every 24 hours  famotidine Injectable 20 milliGRAM(s) IV Push two times a day  immune   globulin gamma IVPB 25 Gram(s) IV Intermittent daily  magnesium sulfate  IVPB 2 Gram(s) IV Intermittent once  nystatin/triamcinolone Cream 1 Application(s) Topical every 12 hours  pantoprazole  Injectable 40 milliGRAM(s) IV Push daily  potassium chloride    Tablet ER 20 milliEquivalent(s) Oral once    MEDICATIONS  (PRN):  ibuprofen  Tablet. 600 milliGRAM(s) Oral every 6 hours PRN Mild Pain (1 - 3)      LABS:                        7.7    7.83  )-----------( 308      ( 06 Apr 2019 12:35 )             25.4     04-06    142  |  96  |  11  ----------------------------<  128<H>  3.8   |  33<H>  |  0.47<L>    Ca    9.5      06 Apr 2019 12:35  Phos  4.0     04-06  Mg     1.7     04-06    TPro  5.9<L>  /  Alb  x   /  TBili  x   /  DBili  x   /  AST  x   /  ALT  x   /  AlkPhos  x   04-06          < from: CT Abdomen and Pelvis w/ Oral Cont and w/ IV Cont (04.05.19 @ 20:36) >    EXAM:  CT CHEST OC IC                          EXAM:  CT ABDOMEN AND PELVIS OC IC                          *** ADDENDUM 04/06/2019  ***    Final interpretation findings discussed by Dr. Rudd with Dr. Pitt   at 10:01 AM on 4/6/2019.      *** END OF ADDENDUM 04/06/2019  ***      PROCEDURE DATE:  04/05/2019          INTERPRETATION:  CT of the chest, abdomen and pelvis with contrast dated   4/5/2019 8:36 PM    INDICATION: shortness of breath . Advanced endometrial cancer, resolved   enterovaginal fistula. Shortness of breath.    TECHNIQUE: CT of the chest, abdomen and pelvis was performed using   intravenous contrast.  Axial and coronal images were produced and   reviewed.    PRIOR STUDIES: CT abdomen and pelvis dated 3/15/2019. Outside chest CTA   dated 8/3/2018, outside PET/CT dated 11/8/2018.    FINDINGS: The major thoracic vessels are normal in appearance. The heart   is normal in size.  No pericardial effusion is seen.  No mediastinal,   hilar or axillary lymphadenopathy is seen.    Right lower lobe and right middle lobe consolidation consistent with   atelectasis or pneumonia. Mosaic lung attenuation bilaterally more   prominent in the right lung consistent with air trapping.  Respiratory   motion artifact obscures detail ofthe pulmonary parenchyma. Ill   marginated 1 cm right apical nodular density, not present on the prior   studies. Small right pleural effusion. Elevated right hemidiaphragm.    Images of the upper abdomen demonstrate hepatic steatosis. Mild   hepatomegaly measuring 17.5 cm in length. No radiopaque stones are seen   in the gallbladder.  The pancreas is normal in appearance.  No splenic   abnormalities are seen.    The adrenal glands are unremarkable. Limited assessment of the kidneys   due to motion artifact especially the right upper pole kidney.   Nonobstructing 9 mm left lower pole renal calculus. The kidneys are   otherwise normal in appearance. No abdominal aortic aneurysm is seen.   Mild retroperitoneal lymphadenopathy, the largest a left para-aortic   lymph node measuring 1.1 cm.     Evaluation of the bowel demonstrates status post subtotal colectomy and a   left lower quadrant colostomy. No bowel obstruction or free air. Multiple   surgical clips in the pelvis posterior and posterolateral to the urinary   bladder surrounding multiple loops of small bowel, with an appearance   that is unchanged since 3/15/2019. There is no evidence of oral contrast   in the vagina. There is a tiny amount of air within the right lateral   vagina fornix which was present also on the prior study. Without images   obtained prior to oral contrast, it is difficult to distinguish suture   material from a residual fistulous track. Presacral fat   stranding/thickening again noted .     Images of the pelvis demonstrate circumferential wall thickening with   mild surrounding stranding. There are a Westbrook catheter within the   bladder. Subcentimeter in short axis left internal iliac lymph node. New   1.4 x 1.2 cm enhancing soft tissue nodule in the left hemipelvis along   the left internal iliac chain could represent lymphadenopathy or a tumor   implant. There are multiple new up to 1 cm in short axis soft tissue   nodules in the intrapelvic fat anterior to the sacrum consistent with   metastasis. Small left obturator internus lymph node is seen. Stable   subcentimeter bilateral inguinal lymph nodes.    Evaluation of the osseous structures demonstrates degenerative changes.   Dextroscoliosis of the thoracolumbar spine. Severe degenerative changes   of the spine worst at the lower cervical and lumbar spine level. There   are patchy lucencies in both femoral heads which may represent patchy   osteoporosis, early AVN cannot be excluded. Multiple soft tissue nodules   are seen in the subcutaneous tissues of the anterior abdominal wall of   nonspecific etiology.    IMPRESSION:  Right lower lobe and right middle lobe consolidation consistent with   atelectasis or pneumonia. Small right pleural effusion.    Marginated 1 cm right upper lobe nodular opacity, not present on the   prior studies but respiratory motion artifact obscures detail. Follow-up   chest CT in 3 months is recommended.    Urinary bladder wall thickening suspicious for cystitis, correlation with   urinalysis is recommended.    Since 3/15/2019, there are new pelvic soft tissue nodules/lymph nodes   consistent with metastasis.      ***Please see the addendum at the top of this report. It may contain   additional important information or changes.****      "Thank you forthe opportunity to participate in the care of this   patient."      < end of copied text >

## 2019-04-06 NOTE — PROGRESS NOTE ADULT - ASSESSMENT
60yo F HD6 with stage IV endometrial adenocarcinoma s/p staging surgery '18 and 1 round of chemotherapy 2/19 readmitted from [facility] with fever previously admitted for management of symptomatic rectovaginal and enterovaginal fistulas 2/22-3/25 now bacteremic. Last fever at 0855am 4/1. Work of breathing improved from yesterday. Pending CT chest.        1. Neuro: no complaints of pain- Motrin prn; Neurology consulted - s/p EMG which was not diagnostic for CIDP but with high suspicion for autoimmune process; receiving IVIG - will f/u panel of labs per recommendations  2. Pulm -Chest x-ray yesterday reveals "mild interval worsening of a small to moderate layering right pleural effusion, right basilar consolidation/atelectasis and right middle lung zone linear atelectasis." Follow up pulmonology consult- pending CT chest, abdomen and pelvis (performed)  Lower extremity dopplers negative  3. Cardio- Echo: within normal limits   4. FEN/GI - Reg diet. s/p TPN. PICC line removed yesterday- follow up PICC tip culture   - elevated liver enzymes now improving- GI consulted appreciate recs, RUQ sono- gallbladder wall thickening and sludge- no gallstones   - No current active leaking of stool  5.  - Westbrook placed given urinary retention 3/16  - Perianal irritation consistent with possible fungal infection- continue nystatin cream 3/23; apply to affected area as needed   - CT abdomen/pelvis w/ IV and oral contrast performed 3/15 showed improved enterovaginal fistula, no longer contrast in vagina, improvement in disease  6. ID: Bacteremia- Blood culture 4/1 positive for gram neg rods, coag neg staph, proteus. Blood culture from PICC 4/3 positive for gram neg rods. Follow up PICC culture    ID following- Ertapenem  s/p Vancomycin, Zosyn (4/1-4/3), and Meropenem (4/3-4/4)  7. Endocrine- ISS, has not taken home metformin in >1 month, monitor FS; endocrine consulted last hospital stay for workup of possible DI which was negative per team   8. VTE prophylaxis - SCDs, ambulate as tolerated, Lovenox 40 daily, PT following    9. Pt has hx of polio with weakness- Follow up neurology recommendations   10. GYN malignancy  - continue anastrozole for treatment  - consider discharge to [facility] to increase patient's performance status, pt desires to go home for private PT however will be unable to be cleared most likely due to extreme weakness.  11. Derm: monitor sacrum for s/s of pressure ulcer. encourage frequent changes in position and OOB during  the day.

## 2019-04-06 NOTE — PROGRESS NOTE ADULT - ASSESSMENT
58yo F HD6 with stage IV endometrial adenocarcinoma s/p staging surgery '18 and 1 round of chemotherapy 2/19 readmitted from [facility] with fever previously admitted for management of symptomatic rectovaginal and enterovaginal fistulas 2/22-3/25 now bacteremic. Last fever at 0855am 4/1. Work of breathing improved and saturating well on RA.       1. 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 Pulm - cough and SOB improved,  saturating 94-99% on RA  Chest x-ray  "mild interval worsening of a small to moderate layering right pleural effusion, right basilar consolidation/atelectasis and right middle lung zone linear atelectasis."  CT Abdomen/Pelvis significant for Right lower lobe and right middle lobe consolidation consistent with atelectasis or pneumonia. Small right pleural effusion. Marginated 1 cm right upper lobe nodular opacity, not present on the prior studies but respiratory motion artifact obscures detail. Follow-up chest CT in 3 months is recommended.  Pulmn Consulted- finding could be pneumonia vs atelectasis, currently on appropriate antibiotic treatment and improving and no pulmn intervention needed at this time   Lower extremity dopplers negative  3. Cardio- Echo: within normal limits   4. FEN/GI - Reg diet. s/p TPN. PICC line removed yesterday- follow up PICC tip culture   - elevated liver enzymes now improving- GI consulted appreciate recs, RUQ sono- gallbladder wall thickening and sludge- no gallstones and hepatic vein patent   - No current active leaking of stool  -replete electrolytes as needed, repleted K and Mg today   -monitor strict I/O and nutritional intake w/ calorie counts   - nutrition consulted will follow calorie counts in order to make suggestions, at this time recommend 1400calories per day, 66g protein, Glucerna shakes TID   5.  - Westbrook placed given urinary retention 3/16  - Perianal irritation consistent with possible fungal infection- continue nystatin cream 3/23; apply to affected area as needed, improving w/ less irritation   - CT abdomen/pelvis w/ IV and oral contrast performed 3/15 showed improved enterovaginal fistula, no contrast in vagina, repeat CT on 4/5 showed no contrast in vagina again, improvement in disease  6. ID: Bacteremia- Blood culture 4/1 positive for gram neg rods, coag neg staph, proteus. Blood culture from PICC 4/3 positive for Proteus ESBL   Follow up PICC culture    attempted to get blood cultures today, unable to obtain due to difficult blood stick, will attempt obtaining blood cultures tomorrow   ID following- IV 1g qd Ertapenem 4/4,   s/p Vancomycin, Zosyn (4/1-4/3), and Meropenem (4/3-4/4)  7. Endocrine- ISS, has not taken home metformin in >1 month, monitor FS; endocrine consulted last hospital stay for workup of possible DI which was negative per team   8. VTE prophylaxis - SCDs, ambulate as tolerated, Lovenox 40 daily, PT following    9. GYN malignancy  - continue anastrozole for treatment  10. Derm: monitor sacrum for s/s of pressure ulcer. encourage frequent changes in position and OOB during  the day.

## 2019-04-07 LAB
-  CEFAZOLIN: SIGNIFICANT CHANGE UP
-  CLINDAMYCIN: SIGNIFICANT CHANGE UP
-  DAPTOMYCIN: SIGNIFICANT CHANGE UP
-  ERYTHROMYCIN: SIGNIFICANT CHANGE UP
-  LINEZOLID: SIGNIFICANT CHANGE UP
-  MEROPENEM: SIGNIFICANT CHANGE UP
-  OXACILLIN: SIGNIFICANT CHANGE UP
-  PENICILLIN: SIGNIFICANT CHANGE UP
-  RIFAMPIN: SIGNIFICANT CHANGE UP
-  TETRACYCLINE: SIGNIFICANT CHANGE UP
-  TRIMETHOPRIM/SULFAMETHOXAZOLE: SIGNIFICANT CHANGE UP
-  VANCOMYCIN: SIGNIFICANT CHANGE UP
ALBUMIN SERPL ELPH-MCNC: 3.2 G/DL — LOW (ref 3.3–5)
ALP SERPL-CCNC: 437 U/L — HIGH (ref 40–120)
ALT FLD-CCNC: 231 U/L — HIGH (ref 10–45)
ANION GAP SERPL CALC-SCNC: 10 MMOL/L — SIGNIFICANT CHANGE UP (ref 5–17)
APTT BLD: 33.9 SEC — SIGNIFICANT CHANGE UP (ref 27.5–36.3)
AST SERPL-CCNC: 139 U/L — HIGH (ref 10–40)
BILIRUB SERPL-MCNC: 0.2 MG/DL — SIGNIFICANT CHANGE UP (ref 0.2–1.2)
BUN SERPL-MCNC: 13 MG/DL — SIGNIFICANT CHANGE UP (ref 7–23)
CALCIUM SERPL-MCNC: 9.1 MG/DL — SIGNIFICANT CHANGE UP (ref 8.4–10.5)
CHLORIDE SERPL-SCNC: 96 MMOL/L — SIGNIFICANT CHANGE UP (ref 96–108)
CO2 SERPL-SCNC: 33 MMOL/L — HIGH (ref 22–31)
CREAT SERPL-MCNC: 0.47 MG/DL — LOW (ref 0.5–1.3)
CULTURE RESULTS: SIGNIFICANT CHANGE UP
GLUCOSE SERPL-MCNC: 196 MG/DL — HIGH (ref 70–99)
HCT VFR BLD CALC: 25.8 % — LOW (ref 34.5–45)
HGB BLD-MCNC: 7.7 G/DL — LOW (ref 11.5–15.5)
INR BLD: 1.12 — SIGNIFICANT CHANGE UP (ref 0.88–1.16)
MCHC RBC-ENTMCNC: 29.8 GM/DL — LOW (ref 32–36)
MCHC RBC-ENTMCNC: 29.8 PG — SIGNIFICANT CHANGE UP (ref 27–34)
MCV RBC AUTO: 100 FL — SIGNIFICANT CHANGE UP (ref 80–100)
METHOD TYPE: SIGNIFICANT CHANGE UP
NRBC # BLD: 0 /100 WBCS — SIGNIFICANT CHANGE UP (ref 0–0)
ORGANISM # SPEC MICROSCOPIC CNT: SIGNIFICANT CHANGE UP
PLATELET # BLD AUTO: 296 K/UL — SIGNIFICANT CHANGE UP (ref 150–400)
POTASSIUM SERPL-MCNC: 3.9 MMOL/L — SIGNIFICANT CHANGE UP (ref 3.5–5.3)
POTASSIUM SERPL-SCNC: 3.9 MMOL/L — SIGNIFICANT CHANGE UP (ref 3.5–5.3)
PROT SERPL-MCNC: 7.3 G/DL — SIGNIFICANT CHANGE UP (ref 6–8.3)
PROTHROM AB SERPL-ACNC: 12.7 SEC — SIGNIFICANT CHANGE UP (ref 10–12.9)
RBC # BLD: 2.58 M/UL — LOW (ref 3.8–5.2)
RBC # FLD: 15.9 % — HIGH (ref 10.3–14.5)
SODIUM SERPL-SCNC: 139 MMOL/L — SIGNIFICANT CHANGE UP (ref 135–145)
SPECIMEN SOURCE: SIGNIFICANT CHANGE UP
WBC # BLD: 6.46 K/UL — SIGNIFICANT CHANGE UP (ref 3.8–10.5)
WBC # FLD AUTO: 6.46 K/UL — SIGNIFICANT CHANGE UP (ref 3.8–10.5)

## 2019-04-07 PROCEDURE — 99231 SBSQ HOSP IP/OBS SF/LOW 25: CPT | Mod: GC

## 2019-04-07 PROCEDURE — 99231 SBSQ HOSP IP/OBS SF/LOW 25: CPT

## 2019-04-07 PROCEDURE — 99232 SBSQ HOSP IP/OBS MODERATE 35: CPT

## 2019-04-07 RX ORDER — VANCOMYCIN HCL 1 G
1000 VIAL (EA) INTRAVENOUS ONCE
Qty: 0 | Refills: 0 | Status: COMPLETED | OUTPATIENT
Start: 2019-04-07 | End: 2019-04-07

## 2019-04-07 RX ORDER — VANCOMYCIN HCL 1 G
VIAL (EA) INTRAVENOUS
Qty: 0 | Refills: 0 | Status: DISCONTINUED | OUTPATIENT
Start: 2019-04-07 | End: 2019-04-09

## 2019-04-07 RX ORDER — VANCOMYCIN HCL 1 G
1000 VIAL (EA) INTRAVENOUS EVERY 12 HOURS
Qty: 0 | Refills: 0 | Status: DISCONTINUED | OUTPATIENT
Start: 2019-04-08 | End: 2019-04-09

## 2019-04-07 RX ADMIN — ENOXAPARIN SODIUM 40 MILLIGRAM(S): 100 INJECTION SUBCUTANEOUS at 12:02

## 2019-04-07 RX ADMIN — PANTOPRAZOLE SODIUM 40 MILLIGRAM(S): 20 TABLET, DELAYED RELEASE ORAL at 12:02

## 2019-04-07 RX ADMIN — ERTAPENEM SODIUM 120 MILLIGRAM(S): 1 INJECTION, POWDER, LYOPHILIZED, FOR SOLUTION INTRAMUSCULAR; INTRAVENOUS at 18:08

## 2019-04-07 RX ADMIN — Medication 600 MILLIGRAM(S): at 05:41

## 2019-04-07 RX ADMIN — ANASTROZOLE 1 MILLIGRAM(S): 1 TABLET ORAL at 12:02

## 2019-04-07 RX ADMIN — Medication 1 APPLICATION(S): at 22:08

## 2019-04-07 RX ADMIN — FAMOTIDINE 20 MILLIGRAM(S): 10 INJECTION INTRAVENOUS at 18:08

## 2019-04-07 RX ADMIN — Medication 62.5 GRAM(S): at 12:02

## 2019-04-07 RX ADMIN — Medication 250 MILLIGRAM(S): at 14:47

## 2019-04-07 RX ADMIN — CHLORHEXIDINE GLUCONATE 1 APPLICATION(S): 213 SOLUTION TOPICAL at 05:35

## 2019-04-07 RX ADMIN — Medication 600 MILLIGRAM(S): at 06:36

## 2019-04-07 RX ADMIN — FAMOTIDINE 20 MILLIGRAM(S): 10 INJECTION INTRAVENOUS at 05:35

## 2019-04-07 RX ADMIN — Medication 1 APPLICATION(S): at 10:22

## 2019-04-07 NOTE — PROGRESS NOTE ADULT - SUBJECTIVE AND OBJECTIVE BOX
GYN Progress Note    Patient seen at bedside.  Pain controlled with motrin.   Tolerating regular diet.   Was OOB to chair today with assistance.   Westbrook catheter in place.     Denies CP, palpitations, SOB, fever, chills, nausea, vomiting.    Vital Signs Last 24 Hrs  T(C): 37.3 (07 Apr 2019 13:24), Max: 37.3 (07 Apr 2019 13:24)  T(F): 99.1 (07 Apr 2019 13:24), Max: 99.1 (07 Apr 2019 13:24)  HR: 83 (07 Apr 2019 13:24) (82 - 86)  BP: 147/89 (07 Apr 2019 13:24) (123/80 - 147/89)  BP(mean): --  RR: 18 (07 Apr 2019 13:24) (16 - 18)  SpO2: 95% (07 Apr 2019 13:24) (93% - 95%)    Physical Exam:  Gen: No Acute Distress  Pulm: Normal work of breathing  GI: soft, nontender, nondistended, +BS, no rebound, no guarding.  Ext: SCDs in place, wnl    I&O's Summary    06 Apr 2019 07:01  -  07 Apr 2019 07:00  --------------------------------------------------------  IN: 1230 mL / OUT: 1395 mL / NET: -165 mL    07 Apr 2019 07:01  -  07 Apr 2019 18:13  --------------------------------------------------------  IN: 1120 mL / OUT: 365 mL / NET: 755 mL      MEDICATIONS  (STANDING):  anastrozole 1 milliGRAM(s) Oral daily  chlorhexidine 2% Cloths 1 Application(s) Topical <User Schedule>  enoxaparin Injectable 40 milliGRAM(s) SubCutaneous daily  ertapenem  IVPB 1000 milliGRAM(s) IV Intermittent every 24 hours  famotidine Injectable 20 milliGRAM(s) IV Push two times a day  immune   globulin gamma IVPB 25 Gram(s) IV Intermittent daily  nystatin/triamcinolone Cream 1 Application(s) Topical every 12 hours  pantoprazole  Injectable 40 milliGRAM(s) IV Push daily  vancomycin  IVPB        MEDICATIONS  (PRN):  ibuprofen  Tablet. 600 milliGRAM(s) Oral every 6 hours PRN Mild Pain (1 - 3)      LABS:                        7.7    6.46  )-----------( 296      ( 07 Apr 2019 10:35 )             25.8     04-07    139  |  96  |  13  ----------------------------<  196<H>  3.9   |  33<H>  |  0.47<L>    Ca    9.1      07 Apr 2019 10:35  Phos  4.0     04-06  Mg     1.7     04-06    TPro  7.3  /  Alb  3.2<L>  /  TBili  0.2  /  DBili  x   /  AST  139<H>  /  ALT  231<H>  /  AlkPhos  437<H>  04-07    PT/INR - ( 07 Apr 2019 10:35 )   PT: 12.7 sec;   INR: 1.12          PTT - ( 07 Apr 2019 10:35 )  PTT:33.9 sec

## 2019-04-07 NOTE — PROGRESS NOTE ADULT - ASSESSMENT
4/5 PICC tip cx with MRSA and MRSE. Bcx 4/3 with MRSE and ESBL Proteus. Surveillance bcx obtained today. Continue ertapenem. Start vancomycin 1g IV [id number] surveillance bcx negative X 48 hours, will plan to d/c vancomycin.

## 2019-04-07 NOTE — CONSULT NOTE ADULT - SUBJECTIVE AND OBJECTIVE BOX
59F significant for breast cancer, diabetes mellitus, endometrial cancer, s/p exploratory laparotomy, enterolysis, SHAMIR, BSO, pelvic lymphadenectomy, low anterior resection mobilization of splenic flexure, end colostomy on 7/30/18 with rectovaginal fistula presenting from [facility] with fever.  She reports having myalgias starting on 3/31 and was found to be febrile to 100.5 at the [facility] facility so was sent to [facility] for further evaluation.  She denies headache, cough, SOB, chest pain, abdominal pain, vomiting, leakage of stool from fistula.     She was previously admitted 2/22-3/25 for management of rectovaginal fistula.  She was initially kept NPO and was given TPN and octreotide daily as conservative approach to treat the fistula whilst providing nutrition. Nutrition team followed Pt daily and made recommendations for TPN and all adjustments necessary. On admission, Pt also found to have a perianal fungal infection most likely caused by stool leakage from vagina. Antifungal was applied at the region daily. Stool output from vagina significantly decreased during hospital stay. Also, urology was consulted for overflow incontinence so joseph catheter was placed. Urine output was noticed to be increasingly high so endocrinologist was consulted to rule out diabetes insipidus, which Pt was found not to have after DI workup. Urine culture was positive for ESBL e-coli and MRSA so Pt was put in isolation and received antibiotics.  Urine culture was repeated 72hrs after antibiotics was stopped per infectious disease recommendations and that came back as negative so isolation was not necessary anymore. Patient was discharged to [place] [facility], and was discharged on 3/25 on TPN, anastrazole daily and anticoagulation treatment.    SICU consulted this evening for shortness of breath by OBGYN service. Patient being followed by pulmonology. Patient found to have small right pleural effusion with RLL & RML consolidation, as seen on previous CT. During seven day hospital stay, patient saturates at low to high 90s. Denies chest pain or shortness of breath. Patient saturating at 100% on 2L NC during time of examination. Bilateral LE u/s did not reveal DVT. Patient states she has chronic b/l LE weakness 2/2 polio for which she received IVIG.     Vital Signs Last 24 Hrs  T(C): 36.7 (07 Apr 2019 20:33), Max: 37.3 (07 Apr 2019 13:24)  T(F): 98.1 (07 Apr 2019 20:33), Max: 99.1 (07 Apr 2019 13:24)  HR: 89 (07 Apr 2019 20:33) (82 - 89)  BP: 129/84 (07 Apr 2019 20:33) (129/84 - 147/89)  BP(mean): --  RR: 16 (07 Apr 2019 20:33) (16 - 18)  SpO2: 93% (07 Apr 2019 20:33) (93% - 95%)    Gen: NAD, AOx3, pale appearing  CV: RRR  Pulm: Unlabored breathing, no respiratory distress, decreased breath sounds in b/l bases, mild crackles noted in RLL, not using accessory muscles  Abd: sNTND, no guarding or rebound  Ext: b/l pedal edema, b/l calves nontender                          7.7    6.46  )-----------( 296      ( 07 Apr 2019 10:35 )             25.8   07 Apr 2019 10:35    139    |  96     |  13     ----------------------------<  196    3.9     |  33     |  0.47     Ca    9.1        07 Apr 2019 10:35  Phos  4.0       06 Apr 2019 12:35  Mg     1.7       06 Apr 2019 12:35    TPro  7.3    /  Alb  3.2    /  TBili  0.2    /  DBili  x      /  AST  139    /  ALT  231    /  AlkPhos  437    07 Apr 2019 10:35  PT/INR - ( 07 Apr 2019 10:35 )   PT: 12.7 sec;   INR: 1.12          PTT - ( 07 Apr 2019 10:35 )  PTT:33.9 sec    < from: CT Abdomen and Pelvis w/ Oral Cont and w/ IV Cont (04.05.19 @ 20:36) >  FINDINGS: The major thoracic vessels are normal in appearance. The heart   is normal in size.  No pericardial effusion is seen.  No mediastinal,   hilar or axillary lymphadenopathy is seen.    Right lower lobe and right middle lobe consolidation consistent with   atelectasis or pneumonia. Mosaic lung attenuation bilaterally more   prominent in the right lung consistent with air trapping.  Respiratory   motion artifact obscures detail ofthe pulmonary parenchyma. Ill   marginated 1 cm right apical nodular density, not present on the prior   studies. Small right pleural effusion. Elevated right hemidiaphragm.    Images of the upper abdomen demonstrate hepatic steatosis. Mild   hepatomegaly measuring 17.5 cm in length. No radiopaque stones are seen   in the gallbladder.  The pancreas is normal in appearance.  No splenic   abnormalities are seen.    The adrenal glands are unremarkable. Limited assessment of the kidneys   due to motion artifact especially the right upper pole kidney.   Nonobstructing 9 mm left lower pole renal calculus. The kidneys are   otherwise normal in appearance. No abdominal aortic aneurysm is seen.   Mild retroperitoneal lymphadenopathy, the largest a left para-aortic   lymph node measuring 1.1 cm.     Evaluation of the bowel demonstrates status post subtotal colectomy and a   left lower quadrant colostomy. No bowel obstruction or free air. Multiple   surgical clips in the pelvis posterior and posterolateral to the urinary   bladder surrounding multiple loops of small bowel, with an appearance   that is unchanged since 3/15/2019. There is no evidence of oral contrast   in the vagina. There is a tiny amount of air within the right lateral   vagina fornix which was present also on the prior study. Without images   obtained prior to oral contrast, it is difficult to distinguish suture   material from a residual fistulous track. Presacral fat   stranding/thickening again noted .     Images of the pelvis demonstrate circumferential wall thickening with   mild surrounding stranding. There are a Joseph catheter within the   bladder. Subcentimeter in short axis left internal iliac lymph node. New   1.4 x 1.2 cm enhancing soft tissue nodule in the left hemipelvis along   the left internal iliac chain could represent lymphadenopathy or a tumor   implant. There are multiple new up to 1 cm in short axis soft tissue   nodules in the intrapelvic fat anterior to the sacrum consistent with   metastasis. Small left obturator internus lymph node is seen. Stable   subcentimeter bilateral inguinal lymph nodes.    Evaluation of the osseous structures demonstrates degenerative changes.   Dextroscoliosis of the thoracolumbar spine. Severe degenerative changes   of the spine worst at the lower cervical and lumbar spine level. There   are patchy lucencies in both femoral heads which may represent patchy   osteoporosis, early AVN cannot be excluded. Multiple soft tissue nodules   are seen in the subcutaneous tissues of the anterior abdominal wall of   nonspecific etiology.    IMPRESSION:  Right lower lobe and right middle lobe consolidation consistent with   atelectasis or pneumonia. Small right pleural effusion.    Marginated 1 cm right upper lobe nodular opacity, not present on the   prior studies but respiratory motion artifact obscures detail. Follow-up   chest CT in 3 months is recommended.    Urinary bladder wall thickening suspicious for cystitis, correlation with   urinalysis is recommended.    Since 3/15/2019, there are new pelvic soft tissue nodules/lymph nodes   consistent with metastasis.

## 2019-04-07 NOTE — PROGRESS NOTE ADULT - ASSESSMENT
60yo F HD7 with stage IV endometrial adenocarcinoma s/p staging surgery '18 and 1 round of chemotherapy 2/19 readmitted from [facility] with fever previously admitted for management of symptomatic rectovaginal and enterovaginal fistulas 2/22-3/25 now bacteremic. Last fever at 0855am 4/1. Work of breathing improved and saturating well on RA.       1. 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 Pulm - cough and SOB improved,  saturating 93-95% on RA; noted to have nodule in lung on CT, will f/u outpatient  Pulmonology Consulted- finding could be pneumonia vs atelectasis, currently on appropriate antibiotic treatment and improving and no pulm intervention needed at this time; Lower extremity dopplers negative  3. Cardio- Echo: within normal limits   4. FEN/GI - Reg diet. s/p TPN. PICC line removed yesterday- PICC tip cx grew MRSA  - elevated liver enzymes now improving- GI consulted appreciate recs, RUQ sono- gallbladder wall thickening and sludge- no gallstones and hepatic vein patent   - No current active leaking of stool  -replete electrolytes as needed   -monitor strict I/O and nutritional intake w/ calorie counts   - nutrition consulted will follow calorie counts in order to make suggestions, at this time recommend 1400calories per day, 66g protein, Glucerna shakes TID   5.  - Westbrook placed given urinary retention 3/16  - Perianal irritation consistent with possible fungal infection- continue nystatin cream 3/23; apply to affected area as needed, improving w/ less irritation   - CT abdomen/pelvis w/ IV and oral contrast performed 3/15 showed improved enterovaginal fistula, no contrast in vagina, repeat CT on 4/5 showed no contrast in vagina again   6. ID: Bacteremia- Blood culture 4/1 positive for gram neg rods, coag neg staph, proteus. Blood culture from PICC 4/3 positive for Proteus ESBL  PICC tip cx significant for MRSA - initiated IV vancomycin 1g q12h  Blood cultures collected today (4/7)  ID following- IV 1g qd Ertapenem 4/4, now vancomycin 1g q12h.     Previously s/p Vancomycin; s/p Zosyn (4/1-4/3), and Meropenem (4/3-4/4)  7. Endocrine- ISS, has not taken home metformin in >1 month, monitor FS; endocrine consulted last hospital stay for workup of possible DI which was negative per team   8. VTE prophylaxis - SCDs, ambulate as tolerated, Lovenox 40 daily, PT following    9. GYN malignancy  - continue anastrozole for treatment at this time --> may switch patient to Faslodex IM   - given CT shows ne pelvic soft tissue nodules/lymph nodes consistent with metastasis, discussed with pharmacy obtaining Faslodex to start on patient, to fill out chemotherapy paperwork today and discuss with pharmacy ability to get Faslodex for patient   10. Derm: monitor sacrum for s/s of pressure ulcer. encourage frequent changes in position and OOB during  the day.

## 2019-04-07 NOTE — CONSULT NOTE ADULT - ASSESSMENT
59F with stage IV endometrial adenocarcinoma s/p staging surgery '18 and 1 round of chemotherapy 2/19 readmitted from [facility] with fever previously admitted for management of symptomatic rectovaginal and enterovaginal fistulas 2/22-3/25 now bacteremic. Patient without shortness of breath or respiratory distress.    -Patient not in respiratory distress, not complaining of shortness of breath, and not using accessory muscles  -Drainage of small right pleural effusion unlikely to improve respiratory function  -Recommend followup with pulmonology  -Seen and examined with Dr. العراقي

## 2019-04-07 NOTE — PROGRESS NOTE ADULT - SUBJECTIVE AND OBJECTIVE BOX
INTERVAL HPI/OVERNIGHT EVENTS: No fevers.     CONSTITUTIONAL:  Negative fever or chills, feels well, good appetite  EYES:  Negative  blurry vision or double vision  CARDIOVASCULAR:  Negative for chest pain or palpitations  RESPIRATORY:  Negative for cough, wheezing, or SOB   GASTROINTESTINAL:  Negative for nausea, vomiting, diarrhea, constipation, or abdominal pain  GENITOURINARY:  Negative frequency, urgency or dysuria  NEUROLOGIC:  No headache, confusion, dizziness, lightheadedness      ANTIBIOTICS/RELEVANT:    MEDICATIONS  (STANDING):  anastrozole 1 milliGRAM(s) Oral daily  chlorhexidine 2% Cloths 1 Application(s) Topical <User Schedule>  enoxaparin Injectable 40 milliGRAM(s) SubCutaneous daily  ertapenem  IVPB 1000 milliGRAM(s) IV Intermittent every 24 hours  famotidine Injectable 20 milliGRAM(s) IV Push two times a day  immune   globulin gamma IVPB 25 Gram(s) IV Intermittent daily  nystatin/triamcinolone Cream 1 Application(s) Topical every 12 hours  pantoprazole  Injectable 40 milliGRAM(s) IV Push daily  vancomycin  IVPB        MEDICATIONS  (PRN):  ibuprofen  Tablet. 600 milliGRAM(s) Oral every 6 hours PRN Mild Pain (1 - 3)        Vital Signs Last 24 Hrs  T(C): 37.3 (07 Apr 2019 13:24), Max: 37.3 (07 Apr 2019 13:24)  T(F): 99.1 (07 Apr 2019 13:24), Max: 99.1 (07 Apr 2019 13:24)  HR: 83 (07 Apr 2019 13:24) (82 - 91)  BP: 147/89 (07 Apr 2019 13:24) (123/80 - 147/89)  BP(mean): --  RR: 18 (07 Apr 2019 13:24) (16 - 18)  SpO2: 95% (07 Apr 2019 13:24) (93% - 96%)    PHYSICAL EXAM:  Constitutional:Well-developed, well nourished  Eyes:THOMAS, EOMI  Ear/Nose/Throat: no oral lesion, no sinus tenderness on percussion	  Neck:no JVD, no lymphadenopathy, supple  Respiratory: CTA heath  Cardiovascular: S1S2 RRR, no murmurs  Gastrointestinal:soft, (+) BS, no HSM  Extremities:no e/e/c  Vascular: DP Pulse:	right normal; left normal      LABS:                        7.7    6.46  )-----------( 296      ( 07 Apr 2019 10:35 )             25.8     04-07    139  |  96  |  13  ----------------------------<  196<H>  3.9   |  33<H>  |  0.47<L>    Ca    9.1      07 Apr 2019 10:35  Phos  4.0     04-06  Mg     1.7     04-06    TPro  7.3  /  Alb  3.2<L>  /  TBili  0.2  /  DBili  x   /  AST  139<H>  /  ALT  231<H>  /  AlkPhos  437<H>  04-07    PT/INR - ( 07 Apr 2019 10:35 )   PT: 12.7 sec;   INR: 1.12          PTT - ( 07 Apr 2019 10:35 )  PTT:33.9 sec      MICROBIOLOGY: Culture - Catheter (04.05.19 @ 12:46)    -  Vancomycin: S 2    -  Vancomycin: S 1.5    -  Trimethoprim/Sulfamethoxazole: R >2/38    -  Trimethoprim/Sulfamethoxazole: R >2/38    -  Vancomycin: S 2    -  RIF- Rifampin: S <=1 Should not be used as monotherapy    -  RIF- Rifampin: S <=1 Should not be used as monotherapy    -  Tetra/Doxy: S <=1    -  Linezolid: S 2    -  Linezolid: S 4    -  Penicillin: R >8    -  Penicillin: R >8    -  Oxacillin: R >2    -  Oxacillin: R >2    -  Cefazolin: R <=4    -  Cefazolin: R 16    -  Clindamycin: R >4    -  Clindamycin: S <=0.25    -  Daptomycin: S 0.5    -  Erythromycin: R >4    -  Erythromycin: R >4    Specimen Source: .Catheter Picc Line Tip Culture    Culture Results:   15,000 CFU/ml Methicillin resistant Staphylococcus aureus  Result called to and read back byJavier marino RN (9UR)  04/07/2019  10:10:21  15,000 CFU/ml Staphylococcus epidermidis    Organism Identification: Methicillin resistant Staphylococcus aureus  Methicillin resistant Staphylococcus aureus  Staphylococcus epidermidis    Organism: Methicillin resistant Staphylococcus aureus    Organism: Methicillin resistant Staphylococcus aureus    Organism: Staphylococcus epidermidis    Method Type: RAJIV    Method Type: ETEST    Method Type: RAJIV    Culture - Blood (04.03.19 @ 18:05)    -  Ampicillin: R >16 These ampicillin results predict results for amoxicillin    Gram Stain:   Aerobic Bottle: Gram Negative Rods  Result called to and read back by_ WARNER Loza RN  04/04/2019 12:35:44    -  Oxacillin: R >2    -  Piperacillin/Tazobactam: R <=8    -  Penicillin: R >8    -  Erythromycin: S <=0.25    -  Gentamicin: S 4    -  Meropenem: S 0.047    -  Linezolid: S 2    -  RIF- Rifampin: S <=1 Should not be used as monotherapy    -  Trimethoprim/Sulfamethoxazole: R >2/38    -  Trimethoprim/Sulfamethoxazole: R >2/38    -  Vancomycin: S 2    -  Tobramycin: S 4    -  Ciprofloxacin: R >2    -  Ceftriaxone: R >32 Enterobacter, Citrobacter, and Serratia may develop resistance during prolonged therapy    -  Clindamycin: S 0.5    -  Cefazolin: R >16    -  Cefazolin: R <=4    -  Ampicillin/Sulbactam: R 8/4    Specimen Source: .Blood None    Organism: Proteus mirabilis ESBL    Organism: Staphylococcus epidermidis    Organism: Proteus mirabilis ESBL    Culture Results:   Growth in aerobic bottle: Proteus mirabilis ESBL  Result called to and read back by_ YANA Hernandez RN  04/06/2019 09:37:23  Growth in aerobic bottle: Staphylococcus epidermidis  Result called to and read back by_ Ms. CHRISTIAN Loza RN  04/05/2019 08:40:28    Organism Identification: Proteus mirabilis ESBL  Proteus mirabilis ESBL  Staphylococcus epidermidis    Method Type: ETEST    Method Type: RAJIV    Method Type: RAJIV        RADIOLOGY & ADDITIONAL STUDIES: reviewed

## 2019-04-07 NOTE — PROGRESS NOTE ADULT - SUBJECTIVE AND OBJECTIVE BOX
GYN Progress Note    Patient seen at bedside this morning. Slept well throughout night. Denies any pain. States she had a few episodes of dry cough overnight, but overall cough has improved. Tolerating regular diet.   Ambulated to chair yesterday with assistance, continues to feel weakness in legs.  Westbrook in place. Denies any stool leaking per vagina.   Denies CP, palpitations, SOB, fever, chills, nausea, vomiting.    Vital Signs Last 24 Hrs  T(C): 37.2 (07 Apr 2019 05:37), Max: 37.2 (07 Apr 2019 05:37)  T(F): 99 (07 Apr 2019 05:37), Max: 99 (07 Apr 2019 05:37)  HR: 84 (07 Apr 2019 05:37) (84 - 91)  BP: 133/88 (07 Apr 2019 05:37) (123/80 - 141/86)  BP(mean): --  RR: 17 (07 Apr 2019 05:37) (16 - 18)  SpO2: 93% (07 Apr 2019 05:37) (93% - 96%)    Physical Exam:  Gen: No Acute Distress, resting comfortable in bed  Pulm: Normal work of breathing, no wheezes/rhonchi/rales   GI: soft, non tender, nondistended, +BS, no rebound, no guarding, ostomy with air and stool output  : no stool leaking   Ext: no edema/erythema/tenderness, left peripheral IV in place, previous left peripheral IV site stable ecchymosis, no erythema/warmth     I&O's Summary    06 Apr 2019 07:01  -  07 Apr 2019 07:00  --------------------------------------------------------  IN: 1230 mL / OUT: 1395 mL / NET: -165 mL    07 Apr 2019 07:01  -  07 Apr 2019 09:26  --------------------------------------------------------  IN: 100 mL / OUT: 75 mL / NET: 25 mL      MEDICATIONS  (STANDING):  anastrozole 1 milliGRAM(s) Oral daily  chlorhexidine 2% Cloths 1 Application(s) Topical <User Schedule>  enoxaparin Injectable 40 milliGRAM(s) SubCutaneous daily  ertapenem  IVPB 1000 milliGRAM(s) IV Intermittent every 24 hours  famotidine Injectable 20 milliGRAM(s) IV Push two times a day  immune   globulin gamma IVPB 25 Gram(s) IV Intermittent daily  nystatin/triamcinolone Cream 1 Application(s) Topical every 12 hours  pantoprazole  Injectable 40 milliGRAM(s) IV Push daily    MEDICATIONS  (PRN):  ibuprofen  Tablet. 600 milliGRAM(s) Oral every 6 hours PRN Mild Pain (1 - 3)      LABS:                        7.7    7.83  )-----------( 308      ( 06 Apr 2019 12:35 )             25.4     04-06    142  |  96  |  11  ----------------------------<  128<H>  3.8   |  33<H>  |  0.47<L>    Ca    9.5      06 Apr 2019 12:35  Phos  4.0     04-06  Mg     1.7     04-06    TPro  5.9<L>  /  Alb  x   /  TBili  x   /  DBili  x   /  AST  x   /  ALT  x   /  AlkPhos  x   04-06

## 2019-04-07 NOTE — PROGRESS NOTE ADULT - ASSESSMENT
58yo F HD7 with stage IV endometrial adenocarcinoma s/p staging surgery '18 and 1 round of chemotherapy 2/19 readmitted from [facility] with fever previously admitted for management of symptomatic rectovaginal and enterovaginal fistulas 2/22-3/25 now bacteremic. Last fever at 0855am 4/1. Work of breathing improved and saturating well on RA.       1. 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 Pulm - cough and SOB improved,  saturating 94-99% on RA  Chest x-ray  "mild interval worsening of a small to moderate layering right pleural effusion, right basilar consolidation/atelectasis and right middle lung zone linear atelectasis."  CT Abdomen/Pelvis significant for Right lower lobe and right middle lobe consolidation consistent with atelectasis or pneumonia. Small right pleural effusion. Marginated 1 cm right upper lobe nodular opacity, not present on the prior studies but respiratory motion artifact obscures detail. Follow-up chest CT in 3 months is recommended.  Pulmn Consulted- finding could be pneumonia vs atelectasis, currently on appropriate antibiotic treatment and improving and no pulmn intervention needed at this time   Lower extremity dopplers negative  3. Cardio- Echo: within normal limits   4. FEN/GI - Reg diet. s/p TPN. PICC line removed yesterday- follow up PICC tip culture   - elevated liver enzymes now improving- GI consulted appreciate recs, RUQ sono- gallbladder wall thickening and sludge- no gallstones and hepatic vein patent   - No current active leaking of stool  -replete electrolytes as needed   -monitor strict I/O and nutritional intake w/ calorie counts   - nutrition consulted will follow calorie counts in order to make suggestions, at this time recommend 1400calories per day, 66g protein, Glucerna shakes TID   5.  - Westbrook placed given urinary retention 3/16  - Perianal irritation consistent with possible fungal infection- continue nystatin cream 3/23; apply to affected area as needed, improving w/ less irritation   - CT abdomen/pelvis w/ IV and oral contrast performed 3/15 showed improved enterovaginal fistula, no contrast in vagina, repeat CT on 4/5 showed no contrast in vagina again   6. ID: Bacteremia- Blood culture 4/1 positive for gram neg rods, coag neg staph, proteus. Blood culture from PICC 4/3 positive for Proteus ESBL  Follow up PICC culture    attempted to get blood cultures 4/6, unable to obtain due to difficult blood stick, will attempt obtaining blood cultures today   ID following- IV 1g qd Ertapenem 4/4,   s/p Vancomycin, Zosyn (4/1-4/3), and Meropenem (4/3-4/4)  7. Endocrine- ISS, has not taken home metformin in >1 month, monitor FS; endocrine consulted last hospital stay for workup of possible DI which was negative per team   8. VTE prophylaxis - SCDs, ambulate as tolerated, Lovenox 40 daily, PT following    9. GYN malignancy  - continue anastrozole for treatment at this time  - given CT shows ne pelvic soft tissue nodules/lymph nodes consistent with metastasis, discussed with pharmacy obtaining Faslodex to start on patient, to fill out chemotherapy paperwork today and discuss with pharmacy ability to get Faslodex for patient   10. Derm: monitor sacrum for s/s of pressure ulcer. encourage frequent changes in position and OOB during  the day.

## 2019-04-07 NOTE — PROGRESS NOTE ADULT - SUBJECTIVE AND OBJECTIVE BOX
Pt seen at bedside, per nurse patient had oxygen desaturation to 87%, nasal cannula with oxygen applied and increased to 95%. Pt has reported periods of difficulty breathing and inability to move, decreased breathe sounds predominantly on right side appreciated on auscultation, given clinical picture will have consultation w/ intensive-care team to determine if any further recommendations.   d/w Dr. Spivey

## 2019-04-08 LAB
% ALBUMIN: 46.6 % — SIGNIFICANT CHANGE UP
% ALPHA 1: 8.6 % — SIGNIFICANT CHANGE UP
% ALPHA 2: 14.2 % — SIGNIFICANT CHANGE UP
% BETA: 13.2 % — SIGNIFICANT CHANGE UP
% GAMMA: 17.4 % — SIGNIFICANT CHANGE UP
ALBUMIN SERPL ELPH-MCNC: 2.7 G/DL — LOW (ref 3.6–5.5)
ALBUMIN SERPL ELPH-MCNC: 2.9 G/DL — LOW (ref 3.3–5)
ALBUMIN/GLOB SERPL ELPH: 0.8 RATIO — SIGNIFICANT CHANGE UP
ALDOLASE SERPL-CCNC: 12 U/L — HIGH (ref 3.3–10.3)
ALP SERPL-CCNC: 364 U/L — HIGH (ref 40–120)
ALPHA1 GLOB SERPL ELPH-MCNC: 0.5 G/DL — HIGH (ref 0.1–0.4)
ALPHA2 GLOB SERPL ELPH-MCNC: 0.8 G/DL — SIGNIFICANT CHANGE UP (ref 0.5–1)
ALT FLD-CCNC: 165 U/L — HIGH (ref 10–45)
ANION GAP SERPL CALC-SCNC: 8 MMOL/L — SIGNIFICANT CHANGE UP (ref 5–17)
ANTI-RIBONUCLEAR PROTEIN: <0.2 AI — SIGNIFICANT CHANGE UP
APTT BLD: 30.5 SEC — SIGNIFICANT CHANGE UP (ref 27.5–36.3)
AST SERPL-CCNC: 70 U/L — HIGH (ref 10–40)
B-GLOBULIN SERPL ELPH-MCNC: 0.8 G/DL — SIGNIFICANT CHANGE UP (ref 0.5–1)
BASOPHILS # BLD AUTO: 0.01 K/UL — SIGNIFICANT CHANGE UP (ref 0–0.2)
BASOPHILS NFR BLD AUTO: 0.2 % — SIGNIFICANT CHANGE UP (ref 0–2)
BILIRUB SERPL-MCNC: 0.2 MG/DL — SIGNIFICANT CHANGE UP (ref 0.2–1.2)
BUN SERPL-MCNC: 12 MG/DL — SIGNIFICANT CHANGE UP (ref 7–23)
CALCIUM SERPL-MCNC: 9.2 MG/DL — SIGNIFICANT CHANGE UP (ref 8.4–10.5)
CANCER AG125 SERPL-ACNC: 64 U/ML — HIGH
CHLORIDE SERPL-SCNC: 97 MMOL/L — SIGNIFICANT CHANGE UP (ref 96–108)
CO2 SERPL-SCNC: 35 MMOL/L — HIGH (ref 22–31)
CREAT SERPL-MCNC: 0.54 MG/DL — SIGNIFICANT CHANGE UP (ref 0.5–1.3)
DSDNA AB FLD-ACNC: <0.2 AI — SIGNIFICANT CHANGE UP
ENA SM AB FLD QL: <0.2 AI — SIGNIFICANT CHANGE UP
ENA SS-A AB FLD IA-ACNC: <0.2 AI — SIGNIFICANT CHANGE UP
EOSINOPHIL # BLD AUTO: 0.14 K/UL — SIGNIFICANT CHANGE UP (ref 0–0.5)
EOSINOPHIL NFR BLD AUTO: 2.8 % — SIGNIFICANT CHANGE UP (ref 0–6)
FIBRINOGEN PPP-MCNC: 352 MG/DL — SIGNIFICANT CHANGE UP (ref 258–438)
GAMMA GLOBULIN: 1 G/DL — SIGNIFICANT CHANGE UP (ref 0.6–1.6)
GLUCOSE SERPL-MCNC: 160 MG/DL — HIGH (ref 70–99)
HCT VFR BLD CALC: 23.7 % — LOW (ref 34.5–45)
HGB BLD-MCNC: 7 G/DL — CRITICAL LOW (ref 11.5–15.5)
IMM GRANULOCYTES NFR BLD AUTO: 1 % — SIGNIFICANT CHANGE UP (ref 0–1.5)
INR BLD: 0.99 — SIGNIFICANT CHANGE UP (ref 0.88–1.16)
INTERPRETATION SERPL IFE-IMP: SIGNIFICANT CHANGE UP
LYMPHOCYTES # BLD AUTO: 1.27 K/UL — SIGNIFICANT CHANGE UP (ref 1–3.3)
LYMPHOCYTES # BLD AUTO: 25.2 % — SIGNIFICANT CHANGE UP (ref 13–44)
MAGNESIUM SERPL-MCNC: 1.8 MG/DL — SIGNIFICANT CHANGE UP (ref 1.6–2.6)
MCHC RBC-ENTMCNC: 29.5 GM/DL — LOW (ref 32–36)
MCHC RBC-ENTMCNC: 30.3 PG — SIGNIFICANT CHANGE UP (ref 27–34)
MCV RBC AUTO: 102.6 FL — HIGH (ref 80–100)
MONOCYTES # BLD AUTO: 0.46 K/UL — SIGNIFICANT CHANGE UP (ref 0–0.9)
MONOCYTES NFR BLD AUTO: 9.1 % — SIGNIFICANT CHANGE UP (ref 2–14)
NEUTROPHILS # BLD AUTO: 3.1 K/UL — SIGNIFICANT CHANGE UP (ref 1.8–7.4)
NEUTROPHILS NFR BLD AUTO: 61.7 % — SIGNIFICANT CHANGE UP (ref 43–77)
NRBC # BLD: 0 /100 WBCS — SIGNIFICANT CHANGE UP (ref 0–0)
PHOSPHATE SERPL-MCNC: 4.3 MG/DL — SIGNIFICANT CHANGE UP (ref 2.5–4.5)
PLATELET # BLD AUTO: 254 K/UL — SIGNIFICANT CHANGE UP (ref 150–400)
POTASSIUM SERPL-MCNC: 4 MMOL/L — SIGNIFICANT CHANGE UP (ref 3.5–5.3)
POTASSIUM SERPL-SCNC: 4 MMOL/L — SIGNIFICANT CHANGE UP (ref 3.5–5.3)
PROT PATTERN SERPL ELPH-IMP: SIGNIFICANT CHANGE UP
PROT SERPL-MCNC: 7.5 G/DL — SIGNIFICANT CHANGE UP (ref 6–8.3)
PROTHROM AB SERPL-ACNC: 11.2 SEC — SIGNIFICANT CHANGE UP (ref 10–12.9)
RBC # BLD: 2.31 M/UL — LOW (ref 3.8–5.2)
RBC # FLD: 16.2 % — HIGH (ref 10.3–14.5)
SODIUM SERPL-SCNC: 140 MMOL/L — SIGNIFICANT CHANGE UP (ref 135–145)
WBC # BLD: 5.03 K/UL — SIGNIFICANT CHANGE UP (ref 3.8–10.5)
WBC # FLD AUTO: 5.03 K/UL — SIGNIFICANT CHANGE UP (ref 3.8–10.5)

## 2019-04-08 PROCEDURE — 99232 SBSQ HOSP IP/OBS MODERATE 35: CPT

## 2019-04-08 PROCEDURE — 99232 SBSQ HOSP IP/OBS MODERATE 35: CPT | Mod: GC

## 2019-04-08 RX ORDER — MAGNESIUM OXIDE 400 MG ORAL TABLET 241.3 MG
400 TABLET ORAL ONCE
Qty: 0 | Refills: 0 | Status: COMPLETED | OUTPATIENT
Start: 2019-04-08 | End: 2019-04-08

## 2019-04-08 RX ORDER — THIAMINE MONONITRATE (VIT B1) 100 MG
500 TABLET ORAL EVERY 24 HOURS
Qty: 0 | Refills: 0 | Status: DISCONTINUED | OUTPATIENT
Start: 2019-04-08 | End: 2019-04-10

## 2019-04-08 RX ADMIN — Medication 250 MILLIGRAM(S): at 05:04

## 2019-04-08 RX ADMIN — ERTAPENEM SODIUM 120 MILLIGRAM(S): 1 INJECTION, POWDER, LYOPHILIZED, FOR SOLUTION INTRAMUSCULAR; INTRAVENOUS at 17:13

## 2019-04-08 RX ADMIN — FAMOTIDINE 20 MILLIGRAM(S): 10 INJECTION INTRAVENOUS at 05:04

## 2019-04-08 RX ADMIN — ENOXAPARIN SODIUM 40 MILLIGRAM(S): 100 INJECTION SUBCUTANEOUS at 12:38

## 2019-04-08 RX ADMIN — PANTOPRAZOLE SODIUM 40 MILLIGRAM(S): 20 TABLET, DELAYED RELEASE ORAL at 12:38

## 2019-04-08 RX ADMIN — FAMOTIDINE 20 MILLIGRAM(S): 10 INJECTION INTRAVENOUS at 17:13

## 2019-04-08 RX ADMIN — Medication 1 APPLICATION(S): at 21:49

## 2019-04-08 RX ADMIN — MAGNESIUM OXIDE 400 MG ORAL TABLET 400 MILLIGRAM(S): 241.3 TABLET ORAL at 10:22

## 2019-04-08 RX ADMIN — Medication 250 MILLIGRAM(S): at 18:26

## 2019-04-08 RX ADMIN — Medication 1 APPLICATION(S): at 10:21

## 2019-04-08 RX ADMIN — ANASTROZOLE 1 MILLIGRAM(S): 1 TABLET ORAL at 12:37

## 2019-04-08 RX ADMIN — CHLORHEXIDINE GLUCONATE 1 APPLICATION(S): 213 SOLUTION TOPICAL at 05:04

## 2019-04-08 RX ADMIN — Medication 62.5 GRAM(S): at 12:37

## 2019-04-08 RX ADMIN — Medication 105 MILLIGRAM(S): at 19:41

## 2019-04-08 NOTE — PROGRESS NOTE ADULT - ASSESSMENT
59 year old female with PMHx significant for Polio, Breast Cancer, Diabetes Mellitus, Endometrial Cancer stage 3, s/p exploratory laparotomy, enterolysis, SHAMIR, BSO, pelvic lymphadenectomy, low anterior resection mobilization of splenic flexure, end colostomy on 7/30/18 with recent admission (2/22-3/25) for management of rectovaginal fistula and CAUTI - now presenting from [facility] with reported fever 100.5 F. Patient found to be bacteremic with ESBL proteus mirabilis, with urine growing the same organism along with Enterococcus fecalis.     Pulmonology consulted for evaluation of shortness breath and R pleural effusion.

## 2019-04-08 NOTE — PROGRESS NOTE ADULT - SUBJECTIVE AND OBJECTIVE BOX
GYN PROGRESS NOTE PGY4    Patient evaluated at the bedside.   saturating well on RA since OOB, as what happens daily  per pulm, no intervention needed  tolerating reg diet  stood up once today  getting IVIG    T(C): 37.3 (04-08-19 @ 16:45), Max: 37.3 (04-08-19 @ 16:45)  HR: 83 (04-08-19 @ 16:45) (77 - 89)  BP: 142/88 (04-08-19 @ 16:45) (125/80 - 142/88)  RR: 17 (04-08-19 @ 16:45) (16 - 18)  SpO2: 95% (04-08-19 @ 16:45) (92% - 100%)    GEN: patient appears well  LUNGS: no respiratory distress  ABD: soft, nt, nd, ostomy  EXT: no calf tenderness, wearing orthotic shoes and braces        04-08 @ 07:01  -  04-08 @ 19:47  --------------------------------------------------------  IN: 0 mL / OUT: 1050 mL / NET: -1050 mL

## 2019-04-08 NOTE — PROGRESS NOTE ADULT - ASSESSMENT
58yo F with stage IV endometrial adenocarcinoma s/p staging surgery '18 and 1 round of chemotherapy 2/19, previously admitted for management of symptomatic rectovaginal and enterovaginal fistulas 2/22-3/25, now presenting from [facility] with fever.   Neurology consulted in setting of worsening LE weakness . s/p EMG   -Etiology of LE weakness includes post polio syndrome versus CIDP , with element of deconditioning. Post polio is low on differential now given EMG findings   -EMG showed evidence of demyelinating neuropathy and CIDP-spectrum disorder is in differential   -Pt is currently improving on IVIG treatment with improving strength in proximal LE. Still unable to move her feet.   -patient currently on IVIG at 0.4gm/kg/day -will complete 5 days treatment  -neurology continues to follow for improvement

## 2019-04-08 NOTE — ADVANCED PRACTICE NURSE CONSULT - ASSESSMENT
Ostomy pouching patent with soft brown stool. Ostomy supplies at the bedside. Required list of prescriptions for ostomy supplies on the front of the patient's chart. Discharge plan for patient is [facility].

## 2019-04-08 NOTE — PROGRESS NOTE ADULT - ASSESSMENT
58yo F HD8 with stage IV endometrial adenocarcinoma s/p staging surgery '18 and 1 round of chemotherapy 2/19 readmitted from [facility] with fever previously admitted for management of symptomatic rectovaginal and enterovaginal fistulas 2/22-3/25 now bacteremic. Last fever at 0855am 4/1.       1. 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 Pulm - cough and SOB improved,  saturating 93-95% on RA during the day; noted to have nodule in lung on CT, will f/u outpatient  Pulmonology Consulted- finding could be pneumonia vs atelectasis, currently on appropriate antibiotic treatment and improving and no pulm intervention needed at this time; Lower extremity dopplers negative  3. Cardio- Echo: within normal limits   4. FEN/GI - Reg diet. s/p TPN. PICC line removed - PICC tip cx grew MRSA  - elevated liver enzymes improved now increasing- GI consulted appreciate recs, RUQ sono- gallbladder wall thickening and sludge- no gallstones and hepatic vein patent   - No current active leaking of stool  - replete electrolytes as needed   - monitor strict I/O and nutritional intake w/ calorie counts   - nutrition consulted will follow calorie counts in order to make suggestions, at this time recommend 1400calories per day, 66g protein, Glucerna shakes TID   5.  - Westbrook placed given urinary retention 3/16  - Perianal irritation consistent with possible fungal infection- continue nystatin cream 3/23; apply to affected area as needed, improving w/ less irritation   - CT abdomen/pelvis w/ IV and oral contrast performed 3/15 showed improved enterovaginal fistula, no contrast in vagina, repeat CT on 4/5 showed no contrast in vagina again   6. ID: Bacteremia- Blood culture 4/1 positive for gram neg rods, coag neg staph, proteus. Blood culture from PICC 4/3 positive for Proteus ESBL  PICC tip cx significant for MRSA - initiated IV vancomycin 1g q12h  Blood cultures collected   ID following- IV 1g qd Ertapenem 4/4, now vancomycin 1g q12h.     Previously s/p Vancomycin; s/p Zosyn (4/1-4/3), and Meropenem (4/3-4/4)  7. Endocrine- ISS, has not taken home metformin in >1 month, monitor FS; endocrine consulted last hospital stay for workup of possible DI which was negative per team   8. VTE prophylaxis - SCDs, ambulate as tolerated, Lovenox 40 daily, PT following    9. GYN malignancy  - continue anastrozole for treatment at this time --> may switch patient to Faslodex IM   - given CT shows ne pelvic soft tissue nodules/lymph nodes consistent with metastasis, discussed with pharmacy obtaining Faslodex to start on patient, to fill out chemotherapy paperwork today and discuss with pharmacy ability to get Faslodex for patient   10. Derm: monitor sacrum for s/s of pressure ulcer. encourage frequent changes in position and OOB during  the day.

## 2019-04-08 NOTE — PROGRESS NOTE ADULT - ATTENDING COMMENTS
Immunocompromised woman with bacteremia/UTI and SOB. Pleural effusions are not the cause of dyspnea. CT chest reviewed: RLL consolidation with effusion (CT imaging overestimates size as compared to POCUS), mosaic attenuation, and RUL apical opacity. Most likely infectious given overall clinical picture (admitted with bacteremia and UTI) but if does not resolve on short interval repeat imaging may need further workup.

## 2019-04-08 NOTE — PROGRESS NOTE ADULT - SUBJECTIVE AND OBJECTIVE BOX
GYN PROGRESS NOTE PGY2    Patient evaluated at the bedside. No acute events.  Denies CP/SOB/dizziness/nausea/vomiting/abdominal pain/calf pain.  Pain well controlled on oral pain medications. Ambulating. Passing flatus. Tolerating regular diet.    T(C): 36.7 (04-08-19 @ 05:13), Max: 37.3 (04-07-19 @ 13:24)  HR: 85 (04-08-19 @ 05:13) (82 - 89)  BP: 125/80 (04-08-19 @ 05:13) (125/80 - 147/89)  RR: 17 (04-08-19 @ 05:13) (16 - 18)  SpO2: 95% (04-08-19 @ 05:13) (93% - 95%)  Wt(kg): --    GEN: patient appears well  LUNGS: no respiratory distress  ABD:  EXT: no calf tenderness        04-07 @ 07:01  -  04-08 @ 07:00  --------------------------------------------------------  IN: 1645 mL / OUT: 1710 mL / NET: -65 mL              A/P:   1. FEN -   2. PAIN -  3.  -  4. RESP -    5. VTE prophylaxis -  6. LABS -   7. DISPO - GYN PROGRESS NOTE PGY5    Patient evaluated at the bedside. No acute events.  She is on 2LNC because of desaturating overnight.   This happens nightly, likely due to position of the patient lying in bed.  Per pulm recs thus far, pleural effusion too small to drain.  ID following to make abx recs.  pt has no complaints this AM: no CP, SOB.  tolerated regular diet yesterday.    T(C): 36.7 (04-08-19 @ 05:13), Max: 37.3 (04-07-19 @ 13:24)  HR: 85 (04-08-19 @ 05:13) (82 - 89)  BP: 125/80 (04-08-19 @ 05:13) (125/80 - 147/89)  RR: 17 (04-08-19 @ 05:13) (16 - 18)  SpO2: 95% (04-08-19 @ 05:13) (93% - 95%)    GEN: patient appears well  LUNGS: no respiratory distress, on 2LNC, lungs cta b/l, poor inspiratory effort  ABD: soft, non tender, non distended, ostomy w/ output  BACK: sacral area red and irritated, no breakage in skin  EXT: no calf tenderness        04-07 @ 07:01  -  04-08 @ 07:00  --------------------------------------------------------  IN: 1645 mL / OUT: 1710 mL / NET: -65 mL

## 2019-04-08 NOTE — PROGRESS NOTE ADULT - PROBLEM SELECTOR PLAN 1
At time of assessment, patient was on room air and without subjective or objective evidence of shortness of breath/respiratory difficulty.   - POCUS: the R sided fluid collection is minimal, unchanged on US on 4/5  - CT chest personally reviewed: RLL effusion with consolidative pattern with some linear opacities in the RML.  - no indication for thoracentesis at this time.  - Continue with antibiotic therapy at this time per ID reccs  - Effusion unlikely to be the reason for the dyspnea  - Recommend repeat CT chest in 6weeks as outpatient to observe for interval resolution.  - Recommend patient to be OOB and ambulate as tolerated  - Patient educated on incentive spirometer use  - Thank you for the consult, please call us with any questions

## 2019-04-08 NOTE — PROGRESS NOTE ADULT - ASSESSMENT
58yo F PMHx polio, breast cancer, DM, Endometrial Cancer stage 3, s/p exploratory laparotomy, enterolysis, SHAMIR, BSO, pelvic lymphadenectomy, low anterior resection mobilization of splenic flexure, end colostomy on 7/30/18 with recent admission (2/22-3/25) for management of rectovaginal fistula and UTI, presented from [facility] with fever. Found to be bacteremic likely 2/2 to UTI vs PICC line infection. Blood cultures with Proteus mirabilis ESBL and Staphylococcus epidermidis (currently not treating). Urine cultures showing 10,000 CFU/ml Enterococcus faecalis & Proteus mirabilis ESBL. Westbrook has been change. PICC line removed.     Recommend:  1. Continue with ertapenem 1g q24 for Proteus mirabilis ESBL  2. PICC catheter growing Staphylococcus epidermidis & Methicillin resistant Staphylococcus aureus - started on vancomycin 1g q12   3. Surveillance blood cultures NTD.     ID team one will follow.

## 2019-04-08 NOTE — PROGRESS NOTE ADULT - SUBJECTIVE AND OBJECTIVE BOX
*Neurology consult   [facility] polio at age 6, breast cancer, diabetes mellitus, endometrial cancer stage 3, s/p exploratory laparotomy, enterolysis, SHAMIR, BSO, pelvic lymphadenectomy, low anterior resection mobilization of splenic flexure, end colostomy on 7/30/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 with fever. She has been on TPN and clears, has joseph in place.    She was previously admitted 2/22-3/25 for management of rectovaginal fistula.  She was initially kept NPO and was given TPN and octreotide daily as conservative approach to treat the fistula whilst providing nutrition. Nutrition team followed Pt daily and made recommendations for TPN and all adjustments necessary. On admission, Pt also found to have a perianal fungal infection most likely caused by stool leakage from vagina. Antifungal was applied at the region daily. Stool output from vagina significantly decreased during hospital stay. Also, urology was consulted for overflow incontinence so joseph catheter was placed. Urine culture was positive for ESBL e-coli and MRSA so Pt was put in isolation and received antibiotics.  Urine culture was repeated 72hrs after antibiotics was stopped per infectious disease recommendations and that came back as negative so isolation was not necessary anymore. Patient was discharged to [place] subacute rehab.  Neurology consulted in setting of worsening LE weakness .    Interval events. EMG DONE,  showed evidence of demyelinating neuropathy. Patient currently on IVIG at 0.4gm/kg/day -will complete 5 days treatment      ROS: b/l LE weakness    MEDICATIONS  (STANDING):  anastrozole 1 milliGRAM(s) Oral daily  chlorhexidine 2% Cloths 1 Application(s) Topical <User Schedule>  enoxaparin Injectable 40 milliGRAM(s) SubCutaneous daily  ertapenem  IVPB 1000 milliGRAM(s) IV Intermittent every 24 hours  famotidine Injectable 20 milliGRAM(s) IV Push two times a day  immune   globulin gamma IVPB 25 Gram(s) IV Intermittent daily  nystatin/triamcinolone Cream 1 Application(s) Topical every 12 hours  pantoprazole  Injectable 40 milliGRAM(s) IV Push daily  thiamine IVPB 500 milliGRAM(s) IV Intermittent every 24 hours  vancomycin  IVPB 1000 milliGRAM(s) IV Intermittent every 12 hours  vancomycin  IVPB        MEDICATIONS  (PRN):  ibuprofen  Tablet. 600 milliGRAM(s) Oral every 6 hours PRN Mild Pain (1 - 3)      Allergies    No Known Allergies    Intolerances        Vital Signs Last 24 Hrs  T(C): 37 (08 Apr 2019 13:30), Max: 37 (08 Apr 2019 13:30)  T(F): 98.6 (08 Apr 2019 13:30), Max: 98.6 (08 Apr 2019 13:30)  HR: 88 (08 Apr 2019 13:30) (77 - 89)  BP: 137/87 (08 Apr 2019 13:30) (125/80 - 137/87)  BP(mean): --  RR: 18 (08 Apr 2019 13:30) (16 - 18)  SpO2: 92% (08 Apr 2019 13:30) (92% - 100%)    PHYSICAL EXAM:  Mental status: Awake, alert and oriented x3. Naming intact.  Attention/concentration intact.  No dysarthria, no aphasia.   Cranial nerves: Pupils equally round and reactive to light, no nystagmus,  face symmetric,   Motor:  Ankle strength is 1/5. Pt unable to wiggle toes or  plantar /dorsi flex.    knee flexion and extension 4/5(improved from 3/5, although patient in seated position now )  thigh flexion and extension 2/5.   Strength in UE 4/5.  Sensation: Intact to light touch  Coordination: No dysmetria on finger-to-nose   Reflexes:  downgoing toes bilaterally        NIHSS:    Fingerstick Blood Glucose: CAPILLARY BLOOD GLUCOSE           LABS:                        7.0    5.03  )-----------( 254      ( 08 Apr 2019 08:00 )             23.7     04-08    140  |  97  |  12  ----------------------------<  160<H>  4.0   |  35<H>  |  0.54    Ca    9.2      08 Apr 2019 07:59  Phos  4.3     04-08  Mg     1.8     04-08    TPro  7.5  /  Alb  2.9<L>  /  TBili  0.2  /  DBili  x   /  AST  70<H>  /  ALT  165<H>  /  AlkPhos  364<H>  04-08    PT/INR - ( 08 Apr 2019 07:37 )   PT: 11.2 sec;   INR: 0.99          PTT - ( 08 Apr 2019 07:37 )  PTT:30.5 sec          RADIOLOGY & ADDITIONAL STUDIES:    IV-tPA (Y/N):                                   Bolus time:  Reason IV-tPA not given:    ASSESSMENT/PLAN: *Neurology consult follow up  [facility] polio at age 6, breast cancer, diabetes mellitus, endometrial cancer stage 3, s/p exploratory laparotomy, enterolysis, SHAMIR, BSO, pelvic lymphadenectomy, low anterior resection mobilization of splenic flexure, end colostomy on 7/30/18 with rectovaginal fistula presenting from [facility] with fever. She has been on TPN and clears, has joseph in place.    She was previously admitted 2/22-3/25 for management of rectovaginal fistula.  She was initially kept NPO and was given TPN and octreotide daily as conservative approach to treat the fistula whilst providing nutrition. Nutrition team followed Pt daily and made recommendations for TPN and all adjustments necessary. On admission, Pt also found to have a perianal fungal infection most likely caused by stool leakage from vagina. Antifungal was applied at the region daily. Stool output from vagina significantly decreased during hospital stay. Also, urology was consulted for overflow incontinence so joseph catheter was placed. Urine culture was positive for ESBL e-coli and MRSA so Pt was put in isolation and received antibiotics.  Urine culture was repeated 72hrs after antibiotics was stopped per infectious disease recommendations and that came back as negative so isolation was not necessary anymore. Patient was discharged to [place] subacute rehab.  Neurology consulted in setting of worsening LE weakness .    Interval events. EMG DONE,  showed evidence of demyelinating neuropathy. Patient currently on IVIG at 0.4gm/kg/day -will complete 5 days treatment      ROS: b/l LE weakness    MEDICATIONS  (STANDING):  anastrozole 1 milliGRAM(s) Oral daily  chlorhexidine 2% Cloths 1 Application(s) Topical <User Schedule>  enoxaparin Injectable 40 milliGRAM(s) SubCutaneous daily  ertapenem  IVPB 1000 milliGRAM(s) IV Intermittent every 24 hours  famotidine Injectable 20 milliGRAM(s) IV Push two times a day  immune   globulin gamma IVPB 25 Gram(s) IV Intermittent daily  nystatin/triamcinolone Cream 1 Application(s) Topical every 12 hours  pantoprazole  Injectable 40 milliGRAM(s) IV Push daily  thiamine IVPB 500 milliGRAM(s) IV Intermittent every 24 hours  vancomycin  IVPB 1000 milliGRAM(s) IV Intermittent every 12 hours  vancomycin  IVPB        MEDICATIONS  (PRN):  ibuprofen  Tablet. 600 milliGRAM(s) Oral every 6 hours PRN Mild Pain (1 - 3)      Allergies    No Known Allergies    Intolerances        Vital Signs Last 24 Hrs  T(C): 37 (08 Apr 2019 13:30), Max: 37 (08 Apr 2019 13:30)  T(F): 98.6 (08 Apr 2019 13:30), Max: 98.6 (08 Apr 2019 13:30)  HR: 88 (08 Apr 2019 13:30) (77 - 89)  BP: 137/87 (08 Apr 2019 13:30) (125/80 - 137/87)  BP(mean): --  RR: 18 (08 Apr 2019 13:30) (16 - 18)  SpO2: 92% (08 Apr 2019 13:30) (92% - 100%)    PHYSICAL EXAM:  Mental status: Awake, alert and oriented x3. Naming intact.  Attention/concentration intact.  No dysarthria, no aphasia.   Cranial nerves: Pupils equally round and reactive to light, no nystagmus,  face symmetric,   Motor:  Ankle strength is 1/5. Pt unable to wiggle toes or  plantar /dorsi flex.   Biceps and knee extension 4+/5 hip flexion 3, triceps 4, deltoid 4 - mildly improved overall  Sensation: Intact to light touch  Coordination: No dysmetria on finger-to-nose   Reflexes:  downgoing toes bilaterally        NIHSS:    Fingerstick Blood Glucose: CAPILLARY BLOOD GLUCOSE           LABS:                        7.0    5.03  )-----------( 254      ( 08 Apr 2019 08:00 )             23.7     04-08    140  |  97  |  12  ----------------------------<  160<H>  4.0   |  35<H>  |  0.54    Ca    9.2      08 Apr 2019 07:59  Phos  4.3     04-08  Mg     1.8     04-08    TPro  7.5  /  Alb  2.9<L>  /  TBili  0.2  /  DBili  x   /  AST  70<H>  /  ALT  165<H>  /  AlkPhos  364<H>  04-08    PT/INR - ( 08 Apr 2019 07:37 )   PT: 11.2 sec;   INR: 0.99          PTT - ( 08 Apr 2019 07:37 )  PTT:30.5 sec          RADIOLOGY & ADDITIONAL STUDIES:    IV-tPA (Y/N):                                   Bolus time:  Reason IV-tPA not given:    ASSESSMENT/PLAN:

## 2019-04-08 NOTE — PROGRESS NOTE ADULT - SUBJECTIVE AND OBJECTIVE BOX
Heme/Onc Progress Note (Dr. García )    Interval History: Pt seen appears stable, no gross bleeding or bruising, no fevers noted. receiving IV abx and IVIG.     Allergies    No Known Allergies    Intolerances        Medications:  MEDICATIONS  (STANDING):  anastrozole 1 milliGRAM(s) Oral daily  chlorhexidine 2% Cloths 1 Application(s) Topical <User Schedule>  enoxaparin Injectable 40 milliGRAM(s) SubCutaneous daily  ertapenem  IVPB 1000 milliGRAM(s) IV Intermittent every 24 hours  famotidine Injectable 20 milliGRAM(s) IV Push two times a day  immune   globulin gamma IVPB 25 Gram(s) IV Intermittent daily  nystatin/triamcinolone Cream 1 Application(s) Topical every 12 hours  pantoprazole  Injectable 40 milliGRAM(s) IV Push daily  thiamine IVPB 500 milliGRAM(s) IV Intermittent every 24 hours  vancomycin  IVPB 1000 milliGRAM(s) IV Intermittent every 12 hours  vancomycin  IVPB        MEDICATIONS  (PRN):  ibuprofen  Tablet. 600 milliGRAM(s) Oral every 6 hours PRN Mild Pain (1 - 3)    enoxaparin Injectable 40 milliGRAM(s) SubCutaneous daily    anastrozole 1 milliGRAM(s) Oral daily        PHYSICAL EXAM:    T(F): 98.6 (04-08-19 @ 13:30), Max: 98.6 (04-08-19 @ 13:30)  HR: 88 (04-08-19 @ 13:30) (77 - 89)  BP: 137/87 (04-08-19 @ 13:30) (125/80 - 137/87)  RR: 18 (04-08-19 @ 13:30) (16 - 18)  SpO2: 92% (04-08-19 @ 13:30) (92% - 100%)  Wt(kg): --    Daily     Daily     GEN: Pale, sitting in chair  HEENT:AT/NC   CVS:+S1, S2, RRR   LUNG: CTA b/L   GI: +BS, no ostomy no bloody outpt   EXT; no c/c/e, picc line discontinued, no local skin infx  NEURO: aaox3      Labs:                          7.0    5.03  )-----------( 254      ( 08 Apr 2019 08:00 )             23.7     CBC Full  -  ( 08 Apr 2019 08:00 )  WBC Count : 5.03 K/uL  RBC Count : 2.31 M/uL  Hemoglobin : 7.0 g/dL  Hematocrit : 23.7 %  Platelet Count - Automated : 254 K/uL  Mean Cell Volume : 102.6 fl  Mean Cell Hemoglobin : 30.3 pg  Mean Cell Hemoglobin Concentration : 29.5 gm/dL  Auto Neutrophil # : 3.10 K/uL  Auto Lymphocyte # : 1.27 K/uL  Auto Monocyte # : 0.46 K/uL  Auto Eosinophil # : 0.14 K/uL  Auto Basophil # : 0.01 K/uL  Auto Neutrophil % : 61.7 %  Auto Lymphocyte % : 25.2 %  Auto Monocyte % : 9.1 %  Auto Eosinophil % : 2.8 %  Auto Basophil % : 0.2 %    PT/INR - ( 08 Apr 2019 07:37 )   PT: 11.2 sec;   INR: 0.99          PTT - ( 08 Apr 2019 07:37 )  PTT:30.5 sec    04-08    140  |  97  |  12  ----------------------------<  160<H>  4.0   |  35<H>  |  0.54    Ca    9.2      08 Apr 2019 07:59  Phos  4.3     04-08  Mg     1.8     04-08    TPro  7.5  /  Alb  2.9<L>  /  TBili  0.2  /  DBili  x   /  AST  70<H>  /  ALT  165<H>  /  AlkPhos  364<H>  04-08       MR Lumbar 4/1     1. No MR evidence of epidural abscess.  2. Diffuse central canal stenosis throughout the lumbar spine, greatest   at L4-L5 level, due to both acquired etiologies from facet arthropathy,   disc disease, and epidural lipomatosis as well as a congenital etiology   from short pedicle morphology.  3. Right foraminal-lateral disc protrusion on background of diffuse bulge   with greater right-sided foraminal narrowing and lateral recess stenosis   which may be affecting the traversing right L5 and/or foraminal right L4   nerve roots.  4. Diffuse disc bulges L2-L3, L4, L5-S1 levels.  5. Multilevel foraminal narrowing, as above.  6. Lumbar kyphosis, apex at L2-L3 level

## 2019-04-08 NOTE — PROGRESS NOTE ADULT - SUBJECTIVE AND OBJECTIVE BOX
Interval Events:  Patient seen and examined at bedside. Patient reports that her breathing is better this am.    MEDICATIONS:  Pulmonary:    Antimicrobials:  ertapenem  IVPB 1000 milliGRAM(s) IV Intermittent every 24 hours  vancomycin  IVPB 1000 milliGRAM(s) IV Intermittent every 12 hours  vancomycin  IVPB        Anticoagulants:  enoxaparin Injectable 40 milliGRAM(s) SubCutaneous daily    Cardiac:    Endocrine:    Allergies    No Known Allergies    Intolerances        Vital Signs Last 24 Hrs  T(C): 36.4 (08 Apr 2019 08:45), Max: 37.3 (07 Apr 2019 13:24)  T(F): 97.5 (08 Apr 2019 08:45), Max: 99.1 (07 Apr 2019 13:24)  HR: 77 (08 Apr 2019 08:45) (77 - 89)  BP: 136/85 (08 Apr 2019 08:45) (125/80 - 147/89)  BP(mean): --  RR: 17 (08 Apr 2019 08:45) (16 - 18)  SpO2: 100% (08 Apr 2019 08:45) (93% - 100%)    04-07 @ 07:01 - 04-08 @ 07:00  --------------------------------------------------------  IN: 1645 mL / OUT: 1710 mL / NET: -65 mL    04-08 @ 07:01  -  04-08 @ 13:13  --------------------------------------------------------  IN: 0 mL / OUT: 500 mL / NET: -500 mL          Physical Exam:  Constitutional: Patient with chemo-related alopecia. Otherwise, in NAD.   HEENT: PERRLA, Normal Range of eye movement with no complaint of diplopia, Normal Hearing  Neck: No LAD, No JVD  Back: Normal spine flexure, No CVA tenderness  Respiratory: Normal air entry  Cardiovascular: S1 and S2 present - no additional abnormal sounds or murmurs. Normal rhythm and rate of pulse.   Gastrointestinal: BS+, soft, NT/ND  Extremities: No peripheral edema  Vascular: 2+ peripheral pulses  Neurological: A/O x 3, CN V-XII grossly intact.  Psychiatric: Normal mood, normal affect  Musculoskeletal: 5/5 strength b/l upper and lower extremities  Skin: No rashes    LABS:      CBC Full  -  ( 08 Apr 2019 08:00 )  WBC Count : 5.03 K/uL  RBC Count : 2.31 M/uL  Hemoglobin : 7.0 g/dL  Hematocrit : 23.7 %  Platelet Count - Automated : 254 K/uL  Mean Cell Volume : 102.6 fl  Mean Cell Hemoglobin : 30.3 pg  Mean Cell Hemoglobin Concentration : 29.5 gm/dL  Auto Neutrophil # : 3.10 K/uL  Auto Lymphocyte # : 1.27 K/uL  Auto Monocyte # : 0.46 K/uL  Auto Eosinophil # : 0.14 K/uL  Auto Basophil # : 0.01 K/uL  Auto Neutrophil % : 61.7 %  Auto Lymphocyte % : 25.2 %  Auto Monocyte % : 9.1 %  Auto Eosinophil % : 2.8 %  Auto Basophil % : 0.2 %    04-08    140  |  97  |  12  ----------------------------<  160<H>  4.0   |  35<H>  |  0.54    Ca    9.2      08 Apr 2019 07:59  Phos  4.3     04-08  Mg     1.8     04-08    TPro  7.5  /  Alb  2.9<L>  /  TBili  0.2  /  DBili  x   /  AST  70<H>  /  ALT  165<H>  /  AlkPhos  364<H>  04-08    PT/INR - ( 08 Apr 2019 07:37 )   PT: 11.2 sec;   INR: 0.99          PTT - ( 08 Apr 2019 07:37 )  PTT:30.5 sec                  RADIOLOGY & ADDITIONAL STUDIES (The following images were personally reviewed):

## 2019-04-08 NOTE — ADVANCED PRACTICE NURSE CONSULT - REASON FOR CONSULT
[facility] nurse follow up to assess patency of ostomy pouching system. Patient is a 59 year old female with history of breast cancer, diabetes mellitus, endometrial cancer stage 3, s/p exploratory laparotomy, enterolysis, SHAMIR, BSO, pelvic lymphadenectomy, low anterior resection mobilization of splenic flexure, end colostomy on 7/30/18 with rectovaginal fistula presenting from [facility] with fever.

## 2019-04-08 NOTE — PROGRESS NOTE ADULT - SUBJECTIVE AND OBJECTIVE BOX
INFECTIOUS DISEASE CONSULT PROGRESS NOTE    INTERVAL HPI/OVERNIGHT EVENTS:    ACTIVE ANTIMICROBIALS/ANTIBIOTICS:  ertapenem  IVPB 1000 milliGRAM(s) IV Intermittent every 24 hours  vancomycin  IVPB 1000 milliGRAM(s) IV Intermittent every 12 hours  vancomycin  IVPB          VITAL SIGNS:  ICU Vital Signs Last 24 Hrs  T(C): 36.7 (08 Apr 2019 05:13), Max: 37.3 (07 Apr 2019 13:24)  T(F): 98.1 (08 Apr 2019 05:13), Max: 99.1 (07 Apr 2019 13:24)  HR: 85 (08 Apr 2019 05:13) (82 - 89)  BP: 125/80 (08 Apr 2019 05:13) (125/80 - 147/89)  BP(mean): --  ABP: --  ABP(mean): --  RR: 17 (08 Apr 2019 05:13) (16 - 18)  SpO2: 95% (08 Apr 2019 05:13) (93% - 95%)      PHYSICAL EXAM:      LABS:                        7.0    5.03  )-----------( 254      ( 08 Apr 2019 08:00 )             23.7       04-08    140  |  97  |  12  ----------------------------<  160<H>  4.0   |  35<H>  |  0.54    Ca    9.2      08 Apr 2019 07:59  Phos  4.3     04-08  Mg     1.8     04-08    TPro  7.5  /  Alb  2.9<L>  /  TBili  0.2  /  DBili  x   /  AST  70<H>  /  ALT  165<H>  /  AlkPhos  364<H>  04-08          MICROBIOLOGY:    Culture - Blood (collected 04-07-19 @ 10:50)  Source: .Blood None  Preliminary Report (04-07-19 @ 23:00):    No growth at 12 hours    Culture - Catheter (collected 04-05-19 @ 12:46)  Source: .Catheter Picc Line Tip Culture  Final Report (04-07-19 @ 10:11):    15,000 CFU/ml Methicillin resistant Staphylococcus aureus    Result called to and read back byJavier marino RN (9UR)  04/07/2019    10:10:21    15,000 CFU/ml Staphylococcus epidermidis  Organism: Methicillin resistant Staphylococcus aureus  Methicillin resistant Staphylococcus aureus  Staphylococcus epidermidis (04-07-19 @ 10:11)  Organism: Staphylococcus epidermidis (04-07-19 @ 10:11)      -  Cefazolin: R <=4      -  Clindamycin: R >4      -  Erythromycin: R >4      -  Linezolid: S 2      -  Oxacillin: R >2      -  Penicillin: R >8      -  RIF- Rifampin: S <=1 Should not be used as monotherapy      -  Trimethoprim/Sulfamethoxazole: R >2/38      -  Vancomycin: S 2      Method Type: RAJIV  Organism: Methicillin resistant Staphylococcus aureus (04-07-19 @ 10:11)      -  Vancomycin: S 1.5      Method Type: ETEST  Organism: Methicillin resistant Staphylococcus aureus (04-07-19 @ 10:11)      -  Cefazolin: R 16      -  Clindamycin: S <=0.25      -  Daptomycin: S 0.5      -  Erythromycin: R >4      -  Linezolid: S 4      -  Oxacillin: R >2      -  Penicillin: R >8      -  RIF- Rifampin: S <=1 Should not be used as monotherapy      -  Tetra/Doxy: S <=1      -  Trimethoprim/Sulfamethoxazole: R >2/38      -  Vancomycin: S 2      Method Type: RAJIV        RADIOLOGY & ADDITIONAL STUDIES: INFECTIOUS DISEASE CONSULT PROGRESS NOTE    INTERVAL HPI/OVERNIGHT EVENTS: No fevers overnight. WBC WNL. Patient only complaining of lower extremity weakness. Otherwise denies any fevers, chills, SOB, dysuria, suprapubic pain or lower back pain.     ACTIVE ANTIMICROBIALS/ANTIBIOTICS:  ertapenem  IVPB 1000 milliGRAM(s) IV Intermittent every 24 hours  vancomycin  IVPB 1000 milliGRAM(s) IV Intermittent every 12 hours  vancomycin  IVPB          VITAL SIGNS:  T(C): 36.7 (08 Apr 2019 05:13), Max: 37.3 (07 Apr 2019 13:24)  T(F): 98.1 (08 Apr 2019 05:13), Max: 99.1 (07 Apr 2019 13:24)  HR: 85 (08 Apr 2019 05:13) (82 - 89)  BP: 125/80 (08 Apr 2019 05:13) (125/80 - 147/89)  BP(mean): --  ABP: --  ABP(mean): --  RR: 17 (08 Apr 2019 05:13) (16 - 18)  SpO2: 95% (08 Apr 2019 05:13) (93% - 95%)      PHYSICAL EXAM:  Constitutional: pale, non-toxic, chronically ill appearing  Ear/Nose/Throat: no oral lesion, no sinus tenderness on percussion	  Neck:no JVD, no lymphadenopathy, supple  Respiratory: dec breath sounds at bases   Cardiovascular: S1S2 RRR, no murmurs  Gastrointestinal: distended, ostomy in place with brown well formed stool   : +Westbrook with clear yellow urine   Extremities: + edema. +PICC   Vascular: DP Pulse:	right normal; left normal      LABS:                        7.0    5.03  )-----------( 254      ( 08 Apr 2019 08:00 )             23.7       04-08    140  |  97  |  12  ----------------------------<  160<H>  4.0   |  35<H>  |  0.54    Ca    9.2      08 Apr 2019 07:59  Phos  4.3     04-08  Mg     1.8     04-08    TPro  7.5  /  Alb  2.9<L>  /  TBili  0.2  /  DBili  x   /  AST  70<H>  /  ALT  165<H>  /  AlkPhos  364<H>  04-08      MICROBIOLOGY:    Culture - Blood (collected 04-07-19 @ 10:50)  Source: .Blood None  Preliminary Report (04-07-19 @ 23:00):    No growth at 12 hours    Culture - Catheter (collected 04-05-19 @ 12:46)  Source: .Catheter Picc Line Tip Culture  Final Report (04-07-19 @ 10:11):    15,000 CFU/ml Methicillin resistant Staphylococcus aureus    Result called to and read back byJavier marino RN (9UR)  04/07/2019    10:10:21    15,000 CFU/ml Staphylococcus epidermidis  Organism: Methicillin resistant Staphylococcus aureus  Methicillin resistant Staphylococcus aureus  Staphylococcus epidermidis (04-07-19 @ 10:11)  Organism: Staphylococcus epidermidis (04-07-19 @ 10:11)      -  Cefazolin: R <=4      -  Clindamycin: R >4      -  Erythromycin: R >4      -  Linezolid: S 2      -  Oxacillin: R >2      -  Penicillin: R >8      -  RIF- Rifampin: S <=1 Should not be used as monotherapy      -  Trimethoprim/Sulfamethoxazole: R >2/38      -  Vancomycin: S 2      Method Type: RAJIV  Organism: Methicillin resistant Staphylococcus aureus (04-07-19 @ 10:11)      -  Vancomycin: S 1.5      Method Type: ETEST  Organism: Methicillin resistant Staphylococcus aureus (04-07-19 @ 10:11)      -  Cefazolin: R 16      -  Clindamycin: S <=0.25      -  Daptomycin: S 0.5      -  Erythromycin: R >4      -  Linezolid: S 4      -  Oxacillin: R >2      -  Penicillin: R >8      -  RIF- Rifampin: S <=1 Should not be used as monotherapy      -  Tetra/Doxy: S <=1      -  Trimethoprim/Sulfamethoxazole: R >2/38      -  Vancomycin: S 2      Method Type: RAJIV

## 2019-04-08 NOTE — PROGRESS NOTE ADULT - ASSESSMENT
60 yo F stage IV endometrial adenocarcinoma s/p staging surgery 7/18 and 1 round of chemotherapy 2/19 course c/b with rectovaginal fistula presenting from [facility] with fever up to 100.5. Pt has been on TPN and recently hospitalized, now with bacteremia and persistent anemia.    #Macrocytic Anemia   Multiple etiologies of anemia, likely multifactorial and contributing to lethargy and clinical appearance, causes likely include nutritional and inflammatory. Chronic hx of anemia, previous baselines around hg 8.     -Hx of TPN, albumin <3, macrocytosis however B12 and folate levels wnl   -Iron panel c/w anemia of chronic inflammation likely 2/2 bacteremia, along with elevations in CRP and sed rate, however hard to r/o concomitant Iron deficiency anemia, low retic count points away from acute bleed, given ongoing infection and liver abnormalities would avoid iron supplementation   -given transaminitis at the time of iron draw, please repeat iron panel as ferritin may have been falsely elevated   -neg bili, plts stable, please r/o addition signs of hemolysis especially given concurrent IVIG admin and A blood type. Obtain LDH/Hapto   -repeat retic count   -per outpt gyn/onc patient received 1 cycle of Paclitaxel/Carboplatin in 2/2019, anticipated katheryn up to 3 weeks however dose dependent and more significant in elderly, however previous exposure to chemo in 2009 (need to obtain collateral, if anthracycline exposure (topoisomerase inhib) could consider treatment related dysplasia, although would affect more than one cell line  -viral studies   -if above work up neg, will consider primary marrow eval  -viral studies   -transfuse for Hg <7 (transfuse as tolerated per pulm funct)   -dvt ppx - lovenox (monitor for bleeding)       Discussed w/ Dr. García

## 2019-04-08 NOTE — PROGRESS NOTE ADULT - ATTENDING COMMENTS
Surveillance bcx 4/7 ngtd. If remains negative tomorrow, will plan to stop vancomycin. Continue ertapenem.

## 2019-04-08 NOTE — PROGRESS NOTE ADULT - ASSESSMENT
60yo F HD8 with stage IV endometrial adenocarcinoma s/p staging surgery '18 and 1 round of chemotherapy 2/19 readmitted from [facility] with fever previously admitted for management of symptomatic rectovaginal and enterovaginal fistulas 2/22-3/25 now bacteremic. Last fever at 0855am 4/1.       1. 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 will speak with Dr. Sellers in AM regarding plan  2. Pulm - cough and SOB improved,  saturating 93-95% on RA during the day; noted to have nodule in lung on CT, will f/u outpatient  Pulmonology Consulted- finding could be pneumonia vs atelectasis, currently on appropriate antibiotic treatment and improving and no pulm intervention needed at this time; Lower extremity dopplers negative  3. Cardio- Echo: within normal limits   4. FEN/GI - Reg diet. s/p TPN. PICC line removed - PICC tip cx grew MRSA  - elevated liver enzymes improved now increasing- GI consulted appreciate recs, RUQ sono- gallbladder wall thickening and sludge- no gallstones and hepatic vein patent   - No current active leaking of stool  - replete electrolytes as needed   - monitor strict I/O and nutritional intake w/ calorie counts   - nutrition consulted will follow calorie counts in order to make suggestions, at this time recommend 1400calories per day, 66g protein, Glucerna shakes TID   5.  - Westbrook placed given urinary retention 3/16  - Perianal irritation consistent with possible fungal infection- continue nystatin cream 3/23; apply to affected area as needed, improving w/ less irritation   - CT abdomen/pelvis w/ IV and oral contrast performed 3/15 showed improved enterovaginal fistula, no contrast in vagina, repeat CT on 4/5 showed no contrast in vagina again   6. ID: Bacteremia- Blood culture 4/1 positive for gram neg rods, coag neg staph, proteus. Blood culture from PICC 4/3 positive for Proteus ESBL  PICC tip cx significant for MRSA - initiated IV vancomycin 1g q12h  Blood cultures collected   ID following- IV 1g qd Ertapenem 4/4, now vancomycin 1g q12h.     Previously s/p Vancomycin; s/p Zosyn (4/1-4/3), and Meropenem (4/3-4/4) *will stop vanc after AM dose most likely  7. Endocrine- ISS, has not taken home metformin in >1 month, monitor FS; endocrine consulted last hospital stay for workup of possible DI which was negative per team   8. VTE prophylaxis - SCDs, ambulate as tolerated, Lovenox 40 daily, PT following    9. GYN malignancy  - continue anastrozole for treatment at this time --> may switch patient to Faslodex IM *call pharmacy and heme/onc in AM  - given CT shows ne pelvic soft tissue nodules/lymph nodes consistent with metastasis, discussed with pharmacy obtaining Faslodex to start on patient, to fill out chemotherapy paperwork today and discuss with pharmacy ability to get Faslodex for patient   10. Derm: monitor sacrum for s/s of pressure ulcer. encourage frequent changes in position and OOB during  the day.

## 2019-04-09 LAB
FERRITIN SERPL-MCNC: 472 NG/ML — HIGH (ref 15–150)
GLUCOSE BLDC GLUCOMTR-MCNC: 188 MG/DL — HIGH (ref 70–99)
HAPTOGLOB SERPL-MCNC: 251 MG/DL — HIGH (ref 34–200)
IRON SATN MFR SERPL: 17 % — SIGNIFICANT CHANGE UP (ref 14–50)
IRON SATN MFR SERPL: 38 UG/DL — SIGNIFICANT CHANGE UP (ref 30–160)
LDH SERPL L TO P-CCNC: 158 U/L — SIGNIFICANT CHANGE UP (ref 50–242)
RBC # BLD: 2.44 M/UL — LOW (ref 3.8–5.2)
RETICS #: 154.2 K/UL — HIGH (ref 25–125)
RETICS/RBC NFR: 6.3 % — HIGH (ref 0.5–2.5)
TIBC SERPL-MCNC: 223 UG/DL — SIGNIFICANT CHANGE UP (ref 220–430)
UIBC SERPL-MCNC: 185 UG/DL — SIGNIFICANT CHANGE UP (ref 110–370)

## 2019-04-09 PROCEDURE — 99232 SBSQ HOSP IP/OBS MODERATE 35: CPT | Mod: GC

## 2019-04-09 PROCEDURE — 99231 SBSQ HOSP IP/OBS SF/LOW 25: CPT

## 2019-04-09 RX ORDER — MEGESTROL ACETATE 40 MG/ML
80 SUSPENSION ORAL EVERY 12 HOURS
Qty: 0 | Refills: 0 | Status: DISCONTINUED | OUTPATIENT
Start: 2019-04-10 | End: 2019-04-10

## 2019-04-09 RX ORDER — INSULIN LISPRO 100/ML
VIAL (ML) SUBCUTANEOUS
Qty: 0 | Refills: 0 | Status: DISCONTINUED | OUTPATIENT
Start: 2019-04-09 | End: 2019-04-17

## 2019-04-09 RX ADMIN — FAMOTIDINE 20 MILLIGRAM(S): 10 INJECTION INTRAVENOUS at 05:31

## 2019-04-09 RX ADMIN — Medication 1: at 21:38

## 2019-04-09 RX ADMIN — ERTAPENEM SODIUM 120 MILLIGRAM(S): 1 INJECTION, POWDER, LYOPHILIZED, FOR SOLUTION INTRAMUSCULAR; INTRAVENOUS at 17:19

## 2019-04-09 RX ADMIN — PANTOPRAZOLE SODIUM 40 MILLIGRAM(S): 20 TABLET, DELAYED RELEASE ORAL at 12:16

## 2019-04-09 RX ADMIN — Medication 1 APPLICATION(S): at 20:40

## 2019-04-09 RX ADMIN — ENOXAPARIN SODIUM 40 MILLIGRAM(S): 100 INJECTION SUBCUTANEOUS at 12:16

## 2019-04-09 RX ADMIN — Medication 62.5 GRAM(S): at 12:53

## 2019-04-09 RX ADMIN — Medication 250 MILLIGRAM(S): at 05:30

## 2019-04-09 RX ADMIN — CHLORHEXIDINE GLUCONATE 1 APPLICATION(S): 213 SOLUTION TOPICAL at 05:31

## 2019-04-09 RX ADMIN — Medication 105 MILLIGRAM(S): at 18:37

## 2019-04-09 RX ADMIN — ANASTROZOLE 1 MILLIGRAM(S): 1 TABLET ORAL at 12:21

## 2019-04-09 RX ADMIN — Medication 1 APPLICATION(S): at 09:30

## 2019-04-09 NOTE — PROGRESS NOTE ADULT - ASSESSMENT
60yo F PMHx polio, breast cancer, DM, Endometrial Cancer stage 3, s/p exploratory laparotomy, enterolysis, SHAMIR, BSO, pelvic lymphadenectomy, low anterior resection mobilization of splenic flexure, end colostomy on 7/30/18 with recent admission (2/22-3/25) for management of rectovaginal fistula and UTI, presented from [facility] with fever. Found to be bacteremic likely 2/2 to UTI vs PICC line infection. Blood cultures with Proteus mirabilis ESBL and Staphylococcus epidermidis (currently not treating). Urine cultures showing 10,000 CFU/ml Enterococcus faecalis & Proteus mirabilis ESBL. Westbrook has been change. PICC line removed.     Recommend:  1. Continue with ertapenem 1g q24 for Proteus mirabilis ESBL with last day of treatment will be 4/16/2018   2. PICC catheter growing Staphylococcus epidermidis & MRSA but given PICC has been removed and patient clinically improving and blood cultures negative at 1 day - OK to discontinue vancomycin   3. Surveillance blood cultures NTD   4. If patient is leaving back to [facility] prior to antibiotic completion she will need a PICC line for antibiotic completion but please ensure removal of PICC line when antibiotic is complete, if patient will remain in hospital until 4/16 then there is no need for PICC line.     ID team one will sign off. Thank-you for the consult. 58yo F PMHx polio, breast cancer, DM, Endometrial Cancer stage 3, s/p exploratory laparotomy, enterolysis, SHAMIR, BSO, pelvic lymphadenectomy, low anterior resection mobilization of splenic flexure, end colostomy on 7/30/18 with recent admission (2/22-3/25) for management of rectovaginal fistula and UTI, presented from [facility] with fever. Found to be bacteremic likely 2/2 to UTI vs PICC line infection. Blood cultures with Proteus mirabilis ESBL and Staphylococcus epidermidis (currently not treating). Urine cultures showing 10,000 CFU/ml Enterococcus faecalis & Proteus mirabilis ESBL. Westbrook has been change. PICC line removed.     Recommend:  1. Continue with ertapenem 1g q24 for Proteus mirabilis ESBL with last day of treatment will be 4/16/2019   2. PICC catheter growing Staphylococcus epidermidis & MRSA but given PICC has been removed and patient clinically improving and blood cultures negative at 1 day - OK to discontinue vancomycin   3. Surveillance blood cultures NTD   4. If patient is leaving back to [facility] prior to antibiotic completion or if vascular access required in house, no ID contraindication to midline catheter placement but please ensure removal of line when antibiotic is complete    ID team one will sign off. Thank-you for the consult.

## 2019-04-09 NOTE — PROGRESS NOTE ADULT - SUBJECTIVE AND OBJECTIVE BOX
GYN PROGRESS NOTE PGY4    Patient evaluated at the bedside. No acute events.  She was not on oxygen overnight.  No complaints.    T(C): 36.7 (04-09-19 @ 05:40), Max: 37.3 (04-08-19 @ 16:45)  HR: 95 (04-09-19 @ 05:50) (77 - 103)  BP: 127/84 (04-09-19 @ 05:40) (127/84 - 142/88)  RR: 17 (04-09-19 @ 05:50) (17 - 18)  SpO2: 91% (04-09-19 @ 05:50) (89% - 100%)    GEN: patient appears well  LUNGS: no respiratory distress, poor inspiratory effort, crackles in LL lung base  ABD: soft, non tender  EXT: no calf tenderness        04-08 @ 07:01  -  04-09 @ 07:00  --------------------------------------------------------  IN: 250 mL / OUT: 1875 mL / NET: -1625 mL    MEDICATIONS  (STANDING):  anastrozole 1 milliGRAM(s) Oral daily  chlorhexidine 2% Cloths 1 Application(s) Topical <User Schedule>  enoxaparin Injectable 40 milliGRAM(s) SubCutaneous daily  ertapenem  IVPB 1000 milliGRAM(s) IV Intermittent every 24 hours  famotidine Injectable 20 milliGRAM(s) IV Push two times a day  immune   globulin gamma IVPB 25 Gram(s) IV Intermittent daily  nystatin/triamcinolone Cream 1 Application(s) Topical every 12 hours  pantoprazole  Injectable 40 milliGRAM(s) IV Push daily  thiamine IVPB 500 milliGRAM(s) IV Intermittent every 24 hours  vancomycin  IVPB 1000 milliGRAM(s) IV Intermittent every 12 hours  vancomycin  IVPB        MEDICATIONS  (PRN):  ibuprofen  Tablet. 600 milliGRAM(s) Oral every 6 hours PRN Mild Pain (1 - 3)

## 2019-04-09 NOTE — PROGRESS NOTE ADULT - SUBJECTIVE AND OBJECTIVE BOX
GYN PROGRESS NOTE PGY4    Patient evaluated at the bedside.   updates:  - will switch endometrial ca treatment from anastrozole to megace 80mg BID for three weeks followed by tamoxifen 20mg BID for three weeks  - heme recommended f/u cbc in 1-2 days   - neuro said continue with IVIG until tomorrow, it is helping  - endocrine: will restart patient's metformin at 500mg BID (was taking 1000mg BID at home) and fingersticks  - ID: s/p vanc, continue ertapenem  - nutrition: continue calorie count    pt has no complaints. eating full regular diet. took a step yesterday.  was on oxygen at the bedside during rounds but removed 2LNC and she saturates 92-95% on RA therefore left off.  pt saw one dark streak and unsure if it was stool. will continue to monitor.     T(C): 36.8 (04-09-19 @ 14:16), Max: 37.4 (04-09-19 @ 09:14)  HR: 75 (04-09-19 @ 14:16) (75 - 103)  BP: 134/59 (04-09-19 @ 14:16) (127/84 - 142/88)  RR: 17 (04-09-19 @ 14:16) (15 - 17)  SpO2: 95% (04-09-19 @ 14:16) (89% - 95%)    GEN: patient appears well  LUNGS: b/l crackles in lower lung bases  ABD: soft, nt, nd, ostomy w/ output  EXT: no calf tenderness; upper extremity strength improving; lower extremity strength 4/5 except for feet (weaker)        04-08 @ 07:01  -  04-09 @ 07:00  --------------------------------------------------------  IN: 250 mL / OUT: 1875 mL / NET: -1625 mL    04-09 @ 07:01  -  04-09 @ 16:14  --------------------------------------------------------  IN: 1200 mL / OUT: 750 mL / NET: 450 mL              A/P:   1. FEN -   2. PAIN -  3.  -  4. RESP -    5. VTE prophylaxis -  6. LABS -   7. DISPO -

## 2019-04-09 NOTE — PROGRESS NOTE ADULT - ASSESSMENT
58 yo F stage IV endometrial adenocarcinoma s/p staging surgery 7/18 and 1 round of chemotherapy 2/19 course c/b with rectovaginal fistula presenting from [facility] with fever up to 100.5. Pt has been on TPN and recently hospitalized, now with bacteremia and persistent anemia.    #Macrocytic Anemia   Multiple etiologies of anemia, likely multifactorial and contributing to lethargy and clinical appearance, causes likely include nutritional and inflammatory. Chronic hx of anemia, previous baselines around hg 8.     -Hx of TPN, albumin <3, macrocytosis however B12 and folate levels wnl   -repeat Iron panel c/w anemia of chronic inflammation, along with elevations in CRP and sed rate likely related to previous blood infection, corrected retic, mildly elevated monitor for bleeding and hemolysis  -neg bili, plts stable, additional hemolysis labs remain negative at this time, will montior closely while receiving concurrent IVIG admin and A blood type.    -per outpt gyn/onc patient received 1 cycle of Paclitaxel/Carboplatin in 2/2019, anticipated katheryn up to 3 weeks however dose dependent and more significant in elderly, however previous exposure to chemo in 2009 (need to obtain collateral, if anthracycline exposure (topoisomerase inhib) could consider treatment related dysplasia, although would affect more than one cell line  -viral studies   -if above work up neg, will consider primary marrow eval  -viral studies   -transfuse for Hg <7 (transfuse as tolerated per pulm funct)   -dvt ppx - lovenox (monitor for bleeding)       Discussed w/ Dr. García

## 2019-04-09 NOTE — PROGRESS NOTE ADULT - ASSESSMENT
60yo F HD8 with stage IV endometrial adenocarcinoma s/p staging surgery '18 and 1 round of chemotherapy 2/19 readmitted from [facility] with fever previously admitted for management of symptomatic rectovaginal and enterovaginal fistulas 2/22-3/25, now bacteremic. Last fever at 0855am 4/1.       1. 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 spoke with neuro today, they said dc IVIG tomorrow and they will update us on their plan after they round, continue thiamine for now  2. Pulm - cough and SOB improved,  saturating 93-95% on RA overnight; noted to have nodule in lung on CT, will f/u outpatient  Pulmonology Consulted- finding could be pneumonia vs atelectasis, currently on appropriate antibiotic treatment and improving and no pulm intervention needed at this time; Lower extremity dopplers negative  2LNC when needed  3. Cardio- Echo: within normal limits   4. FEN/GI - Reg diet. s/p TPN. PICC line removed - PICC tip cx grew MRSA  - elevated liver enzymes improved now increasing- GI consulted appreciate recs, RUQ sono- gallbladder wall thickening and sludge- no gallstones and hepatic vein patent   - No current active leaking of stool, continue to monitor closely  - replete electrolytes as needed   - monitor strict I/O and nutritional intake w/ calorie counts   - nutrition consulted will follow calorie counts in order to make suggestions, at this time recommend 1400calories per day, 66g protein, Glucerna shakes TID , follow up final recs after calorie count data collected  5.  - Westbrook placed given urinary retention 3/16  - Perianal irritation consistent with possible fungal infection- continue nystatin cream 3/23; apply to affected area as needed, improving w/ less irritation   - CT abdomen/pelvis w/ IV and oral contrast performed 3/15 showed improved enterovaginal fistula, no contrast in vagina, repeat CT on 4/5 showed no contrast in vagina again   - follow up Neurology recommendations regarding neurogenic bladder  6. ID: Bacteremia- Blood culture 4/1 positive for gram neg rods, coag neg staph, proteus. Blood culture from PICC 4/3 positive for Proteus ESBL  PICC tip cx significant for MRSA - initiated IV vancomycin 1g q12h  Blood cultures collected   ID following- IV 1g qd Ertapenem 4/4, now s/p vancomycin 1g q12h.    7. Endocrine- ISS, has not taken home metformin in >1 month, monitor FS; endocrine consulted last hospital stay for workup of possible DI which was negative per team ; restart metformin 500mg BID today, and do fingersticks  8. VTE prophylaxis - SCDs, ambulate as tolerated, Lovenox 40 daily, PT following    9. GYN malignancy  - continue anastrozole for treatment at this time --> no longer switching patient to Faslodex IM, will now do megace and tamoxifen, see dose above *call pharmacy and heme/onc in AM  10. Derm: monitor sacrum for s/s of pressure ulcer. encourage frequent changes in position and OOB during  the day. 60yo F HD8 with stage IV endometrial adenocarcinoma s/p staging surgery '18 and 1 round of chemotherapy 2/19 readmitted from [facility] [place] with fever previously admitted for management of symptomatic rectovaginal and enterovaginal fistulas 2/22-3/25, now bacteremic. Last fever at 0855am 4/1.       1. 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 spoke with neuro today, they said dc IVIG tomorrow and they will update us on their plan after they round, continue thiamine for now  2. Pulm - cough and SOB improved,  saturating 93-95% on RA overnight; noted to have nodule in lung on CT, will f/u outpatient  Pulmonology Consulted- finding could be pneumonia vs atelectasis, currently on appropriate antibiotic treatment and improving and no pulm intervention needed at this time; Lower extremity dopplers negative  2LNC when needed  3. Cardio- Echo: within normal limits   4. FEN/GI - Reg diet. s/p TPN. PICC line removed - PICC tip cx grew MRSA  - elevated liver enzymes improved now increasing- GI consulted appreciate recs, RUQ sono- gallbladder wall thickening and sludge- no gallstones and hepatic vein patent   - No current active leaking of stool, continue to monitor closely  - replete electrolytes as needed   - monitor strict I/O and nutritional intake w/ calorie counts   - nutrition consulted will follow calorie counts in order to make suggestions, at this time recommend 1400calories per day, 66g protein, Glucerna shakes TID , follow up final recs after calorie count data collected  5.  - Westbrook placed given urinary retention 3/16  - Perianal irritation consistent with possible fungal infection- continue nystatin cream 3/23; apply to affected area as needed, improving w/ less irritation   - CT abdomen/pelvis w/ IV and oral contrast performed 3/15 showed improved enterovaginal fistula, no contrast in vagina, repeat CT on 4/5 showed no contrast in vagina again   - follow up Neurology recommendations regarding neurogenic bladder  6. ID: Bacteremia- Blood culture 4/1 positive for gram neg rods, coag neg staph, proteus. Blood culture from PICC 4/3 positive for Proteus ESBL  PICC tip cx significant for MRSA - initiated IV vancomycin 1g q12h  Blood cultures collected   ID following- IV 1g qd Ertapenem 4/4, now s/p vancomycin 1g q12h.    7. Endocrine- ISS, has not taken home metformin in >1 month, monitor FS; endocrine consulted last hospital stay for workup of possible DI which was negative per team ; restart metformin 500mg BID today**can't due to pharmacy, recommend ISS while in house or consult endocrinology, fingersticks  8. VTE prophylaxis - SCDs, ambulate as tolerated, Lovenox 40 daily, PT following    9. GYN malignancy  - continue anastrozole for treatment at this time --> no longer switching patient to Faslodex IM, will now do megace and tamoxifen, see dose above *call pharmacy and heme/onc in AM  10. Derm: monitor sacrum for s/s of pressure ulcer. encourage frequent changes in position and OOB during  the day.

## 2019-04-09 NOTE — PROGRESS NOTE ADULT - SUBJECTIVE AND OBJECTIVE BOX
Neurology f/u  PMH polio at age 6, breast cancer, diabetes mellitus, endometrial cancer stage 3, s/p exploratory laparotomy, enterolysis, SHAMIR, BSO, pelvic lymphadenectomy, low anterior resection mobilization of splenic flexure, end colostomy on 7/30/18 with rectovaginal fistula presenting from [facility] with fever. She has been on TPN and clears, has joseph in place.    She was previously admitted 2/22-3/25 for management of rectovaginal fistula.  She was initially kept NPO and was given TPN and octreotide daily as conservative approach to treat the fistula whilst providing nutrition. Nutrition team followed Pt daily and made recommendations for TPN and all adjustments necessary. On admission, Pt also found to have a perianal fungal infection most likely caused by stool leakage from vagina. Antifungal was applied at the region daily. Stool output from vagina significantly decreased during hospital stay. Also, urology was consulted for overflow incontinence so joseph catheter was placed. Urine culture was positive for ESBL e-coli and MRSA so Pt was put in isolation and received antibiotics.  Urine culture was repeated 72hrs after antibiotics was stopped per infectious disease recommendations and that came back as negative so isolation was not necessary anymore. Patient was discharged to [facility].  Neurology consulted in setting of worsening LE weakness .    Interval events. EMG DONE,  showed evidence of demyelinating neuropathy. Patient currently on IVIG at 0.4gm/kg/day -will complete 5 days treatment. On day 4 today   HPI:    PAST MEDICAL & SURGICAL HISTORY:                ROS: back pain     MEDICATIONS  (STANDING):  anastrozole 1 milliGRAM(s) Oral daily  chlorhexidine 2% Cloths 1 Application(s) Topical <User Schedule>  enoxaparin Injectable 40 milliGRAM(s) SubCutaneous daily  ertapenem  IVPB 1000 milliGRAM(s) IV Intermittent every 24 hours  famotidine Injectable 20 milliGRAM(s) IV Push two times a day  immune   globulin gamma IVPB 25 Gram(s) IV Intermittent daily  nystatin/triamcinolone Cream 1 Application(s) Topical every 12 hours  pantoprazole  Injectable 40 milliGRAM(s) IV Push daily  thiamine IVPB 500 milliGRAM(s) IV Intermittent every 24 hours  vancomycin  IVPB 1000 milliGRAM(s) IV Intermittent every 12 hours  vancomycin  IVPB        MEDICATIONS  (PRN):  ibuprofen  Tablet. 600 milliGRAM(s) Oral every 6 hours PRN Mild Pain (1 - 3)      Allergies    No Known Allergies    Intolerances        Vital Signs Last 24 Hrs  T(C): 36.7 (09 Apr 2019 05:40), Max: 37.3 (08 Apr 2019 16:45)  T(F): 98 (09 Apr 2019 05:40), Max: 99.1 (08 Apr 2019 16:45)  HR: 95 (09 Apr 2019 05:50) (77 - 103)  BP: 127/84 (09 Apr 2019 05:40) (127/84 - 142/88)  BP(mean): --  RR: 17 (09 Apr 2019 05:50) (17 - 18)  SpO2: 91% (09 Apr 2019 05:50) (89% - 100%)    PHYSICAL EXAM:  Mental status: Awake, alert and oriented x3. Naming intact.  Attention/concentration intact.  No dysarthria, no aphasia.   Cranial nerves: Pupils equally round and reactive to light, no nystagmus,  face symmetric  Motor:  Ankle strength is 1/5. Pt unable to wiggle toes or  plantar /dorsi flex. UE strength 4/5 bilaterally . Patients knee flexion extension 4/5. Hip flexion 3 .    Sensation: Intact to light touch  Coordination: No dysmetria on finger-to-nose   Reflexes:  downgoing toes bilaterally    NIHSS:    Fingerstick Blood Glucose: CAPILLARY BLOOD GLUCOSE           LABS:                        7.0    5.03  )-----------( 254      ( 08 Apr 2019 08:00 )             23.7     04-08    140  |  97  |  12  ----------------------------<  160<H>  4.0   |  35<H>  |  0.54    Ca    9.2      08 Apr 2019 07:59  Phos  4.3     04-08  Mg     1.8     04-08    TPro  7.5  /  Alb  2.9<L>  /  TBili  0.2  /  DBili  x   /  AST  70<H>  /  ALT  165<H>  /  AlkPhos  364<H>  04-08    PT/INR - ( 08 Apr 2019 07:37 )   PT: 11.2 sec;   INR: 0.99          PTT - ( 08 Apr 2019 07:37 )  PTT:30.5 sec          RADIOLOGY & ADDITIONAL STUDIES:    IV-tPA (Y/N):                                   Bolus time:  Reason IV-tPA not given:    ASSESSMENT/PLAN:

## 2019-04-09 NOTE — PROGRESS NOTE ADULT - SUBJECTIVE AND OBJECTIVE BOX
INFECTIOUS DISEASE CONSULT PROGRESS NOTE    INTERVAL HPI/OVERNIGHT EVENTS:    ACTIVE ANTIMICROBIALS/ANTIBIOTICS:  ertapenem  IVPB 1000 milliGRAM(s) IV Intermittent every 24 hours  vancomycin  IVPB 1000 milliGRAM(s) IV Intermittent every 12 hours  vancomycin  IVPB          VITAL SIGNS:  ICU Vital Signs Last 24 Hrs  T(C): 36.7 (09 Apr 2019 05:40), Max: 37.3 (08 Apr 2019 16:45)  T(F): 98 (09 Apr 2019 05:40), Max: 99.1 (08 Apr 2019 16:45)  HR: 95 (09 Apr 2019 05:50) (83 - 103)  BP: 127/84 (09 Apr 2019 05:40) (127/84 - 142/88)  BP(mean): --  ABP: --  ABP(mean): --  RR: 17 (09 Apr 2019 05:50) (17 - 18)  SpO2: 91% (09 Apr 2019 05:50) (89% - 95%)      PHYSICAL EXAM:      LABS:                        7.0    5.03  )-----------( 254      ( 08 Apr 2019 08:00 )             23.7       04-08    140  |  97  |  12  ----------------------------<  160<H>  4.0   |  35<H>  |  0.54    Ca    9.2      08 Apr 2019 07:59  Phos  4.3     04-08  Mg     1.8     04-08    TPro  7.5  /  Alb  2.9<L>  /  TBili  0.2  /  DBili  x   /  AST  70<H>  /  ALT  165<H>  /  AlkPhos  364<H>  04-08          MICROBIOLOGY:    Culture - Blood (collected 04-08-19 @ 08:24)  Source: .Blood blood  Preliminary Report (04-09-19 @ 09:00):    No growth at 1 day.    Culture - Blood (collected 04-07-19 @ 10:50)  Source: .Blood None  Preliminary Report (04-08-19 @ 11:01):    No growth at 1 day.        RADIOLOGY & ADDITIONAL STUDIES: INFECTIOUS DISEASE CONSULT PROGRESS NOTE    INTERVAL HPI/OVERNIGHT EVENTS: No fevers, no leukocytosis. Patient denies any complaints in the AM.     ACTIVE ANTIMICROBIALS/ANTIBIOTICS:  ertapenem  IVPB 1000 milliGRAM(s) IV Intermittent every 24 hours  vancomycin  IVPB 1000 milliGRAM(s) IV Intermittent every 12 hours  vancomycin  IVPB        VITAL SIGNS:  T(C): 36.7 (09 Apr 2019 05:40), Max: 37.3 (08 Apr 2019 16:45)  T(F): 98 (09 Apr 2019 05:40), Max: 99.1 (08 Apr 2019 16:45)  HR: 95 (09 Apr 2019 05:50) (83 - 103)  BP: 127/84 (09 Apr 2019 05:40) (127/84 - 142/88)  BP(mean): --  ABP: --  ABP(mean): --  RR: 17 (09 Apr 2019 05:50) (17 - 18)  SpO2: 91% (09 Apr 2019 05:50) (89% - 95%)    PHYSICAL EXAM:  Constitutional: pale, non-toxic, chronically ill appearing  Ear/Nose/Throat: no oral lesion, no sinus tenderness on percussion	  Neck:no JVD, no lymphadenopathy, supple  Respiratory: dec breath sounds at bases   Cardiovascular: S1S2 RRR, no murmurs  Gastrointestinal: distended, ostomy in place with brown well formed stool   : +Westbrook with clear yellow urine   Extremities: + edema, picc removed   Vascular: DP Pulse:	right normal; left normal    LABS:                        7.0    5.03  )-----------( 254      ( 08 Apr 2019 08:00 )             23.7       04-08    140  |  97  |  12  ----------------------------<  160<H>  4.0   |  35<H>  |  0.54    Ca    9.2      08 Apr 2019 07:59  Phos  4.3     04-08  Mg     1.8     04-08    TPro  7.5  /  Alb  2.9<L>  /  TBili  0.2  /  DBili  x   /  AST  70<H>  /  ALT  165<H>  /  AlkPhos  364<H>  04-08      MICROBIOLOGY:    Culture - Blood (collected 04-08-19 @ 08:24)  Source: .Blood blood  Preliminary Report (04-09-19 @ 09:00):    No growth at 1 day.    Culture - Blood (collected 04-07-19 @ 10:50)  Source: .Blood None  Preliminary Report (04-08-19 @ 11:01):    No growth at 1 day.

## 2019-04-09 NOTE — PROGRESS NOTE ADULT - ASSESSMENT
60yo F with stage IV endometrial adenocarcinoma s/p staging surgery '18 and 1 round of chemotherapy 2/19, previously admitted for management of symptomatic rectovaginal and enterovaginal fistulas 2/22-3/25, now presenting from [facility] with fever.   Neurology consulted in setting of worsening LE weakness . s/p EMG     -Etiology of LE weakness includes post polio syndrome versus CIDP , with element of deconditioning. Post polio is low on differential now given EMG findings   -EMG showed evidence of demyelinating neuropathy and CIDP-spectrum disorder is in differential   -Pt is currently improving on IVIG treatment with improving strength in proximal LE. Still unable to move her feet.   -patient currently on IVIG at 0.4gm/kg/day -will complete 5 days treatment. She is currently on day 4 of treatment.   -neurology continues to follow for improvement

## 2019-04-10 LAB
A-TOCOPHEROL VIT E SERPL-MCNC: 9.3 MG/L — SIGNIFICANT CHANGE UP (ref 7–25.1)
ANA TITR SER: NEGATIVE — SIGNIFICANT CHANGE UP
BETA+GAMMA TOCOPHEROL SERPL-MCNC: 1.6 MG/L — SIGNIFICANT CHANGE UP (ref 0.5–5.5)
COPPER SERPL-MCNC: 132 UG/DL — SIGNIFICANT CHANGE UP (ref 72–166)
CULTURE RESULTS: SIGNIFICANT CHANGE UP
DSDNA AB SER-ACNC: <12 IU/ML — SIGNIFICANT CHANGE UP
GLUCOSE BLDC GLUCOMTR-MCNC: 138 MG/DL — HIGH (ref 70–99)
GLUCOSE BLDC GLUCOMTR-MCNC: 162 MG/DL — HIGH (ref 70–99)
GLUCOSE BLDC GLUCOMTR-MCNC: 163 MG/DL — HIGH (ref 70–99)
GLUCOSE BLDC GLUCOMTR-MCNC: 173 MG/DL — HIGH (ref 70–99)
ORGANISM # SPEC MICROSCOPIC CNT: SIGNIFICANT CHANGE UP
SPECIMEN SOURCE: SIGNIFICANT CHANGE UP

## 2019-04-10 PROCEDURE — 99231 SBSQ HOSP IP/OBS SF/LOW 25: CPT

## 2019-04-10 PROCEDURE — 99232 SBSQ HOSP IP/OBS MODERATE 35: CPT

## 2019-04-10 RX ORDER — NYSTATIN/TRIAMCINOLONE ACET
1 OINTMENT (GRAM) TOPICAL EVERY 12 HOURS
Qty: 0 | Refills: 0 | Status: DISCONTINUED | OUTPATIENT
Start: 2019-04-10 | End: 2019-04-17

## 2019-04-10 RX ORDER — MEGESTROL ACETATE 40 MG/ML
80 SUSPENSION ORAL
Qty: 0 | Refills: 0 | Status: DISCONTINUED | OUTPATIENT
Start: 2019-04-10 | End: 2019-04-17

## 2019-04-10 RX ORDER — THIAMINE MONONITRATE (VIT B1) 100 MG
100 TABLET ORAL EVERY 24 HOURS
Qty: 0 | Refills: 0 | Status: DISCONTINUED | OUTPATIENT
Start: 2019-04-10 | End: 2019-04-11

## 2019-04-10 RX ORDER — METFORMIN HYDROCHLORIDE 850 MG/1
1000 TABLET ORAL EVERY 12 HOURS
Qty: 0 | Refills: 0 | Status: DISCONTINUED | OUTPATIENT
Start: 2019-04-10 | End: 2019-04-17

## 2019-04-10 RX ORDER — ENOXAPARIN SODIUM 100 MG/ML
40 INJECTION SUBCUTANEOUS EVERY 24 HOURS
Qty: 0 | Refills: 0 | Status: DISCONTINUED | OUTPATIENT
Start: 2019-04-11 | End: 2019-04-17

## 2019-04-10 RX ADMIN — Medication 101 MILLIGRAM(S): at 18:38

## 2019-04-10 RX ADMIN — Medication 1 APPLICATION(S): at 17:27

## 2019-04-10 RX ADMIN — CHLORHEXIDINE GLUCONATE 1 APPLICATION(S): 213 SOLUTION TOPICAL at 05:33

## 2019-04-10 RX ADMIN — ENOXAPARIN SODIUM 40 MILLIGRAM(S): 100 INJECTION SUBCUTANEOUS at 12:29

## 2019-04-10 RX ADMIN — Medication 1: at 12:01

## 2019-04-10 RX ADMIN — Medication 1: at 21:57

## 2019-04-10 RX ADMIN — ERTAPENEM SODIUM 120 MILLIGRAM(S): 1 INJECTION, POWDER, LYOPHILIZED, FOR SOLUTION INTRAMUSCULAR; INTRAVENOUS at 17:14

## 2019-04-10 RX ADMIN — Medication 1: at 17:11

## 2019-04-10 RX ADMIN — Medication 62.5 GRAM(S): at 12:29

## 2019-04-10 RX ADMIN — METFORMIN HYDROCHLORIDE 1000 MILLIGRAM(S): 850 TABLET ORAL at 17:13

## 2019-04-10 RX ADMIN — MEGESTROL ACETATE 80 MILLIGRAM(S): 40 SUSPENSION ORAL at 18:37

## 2019-04-10 NOTE — PROGRESS NOTE ADULT - SUBJECTIVE AND OBJECTIVE BOX
Neurology f/u  PMH polio at age 6, breast cancer, diabetes mellitus, endometrial cancer stage 3, s/p exploratory laparotomy, enterolysis, SHAMIR, BSO, pelvic lymphadenectomy, low anterior resection mobilization of splenic flexure, end colostomy on 7/30/18 with rectovaginal fistula presenting from [facility] with fever. She has been on TPN and clears, has joseph in place.    She was previously admitted 2/22-3/25 for management of rectovaginal fistula.  She was initially kept NPO and was given TPN and octreotide daily as conservative approach to treat the fistula whilst providing nutrition. Nutrition team followed Pt daily and made recommendations for TPN and all adjustments necessary. On admission, Pt also found to have a perianal fungal infection most likely caused by stool leakage from vagina. Antifungal was applied at the region daily. Stool output from vagina significantly decreased during hospital stay. Also, urology was consulted for overflow incontinence so joseph catheter was placed. Urine culture was positive for ESBL e-coli and MRSA so Pt was put in isolation and received antibiotics.  Urine culture was repeated 72hrs after antibiotics was stopped per infectious disease recommendations and that came back as negative so isolation was not necessary anymore. Patient was discharged to [place] [facility].  Neurology consulted in setting of worsening LE weakness .    Interval events. EMG DONE,  showed evidence of demyelinating neuropathy. Patient currently on IVIG at 0.4gm/kg/day -will complete 5 days treatment. On day 5 today           MEDICATIONS  (STANDING):  chlorhexidine 2% Cloths 1 Application(s) Topical <User Schedule>  enoxaparin Injectable 40 milliGRAM(s) SubCutaneous daily  ertapenem  IVPB 1000 milliGRAM(s) IV Intermittent every 24 hours  immune   globulin gamma IVPB 25 Gram(s) IV Intermittent daily  insulin lispro (HumaLOG) corrective regimen sliding scale   SubCutaneous Before meals and at bedtime  megestrol 80 milliGRAM(s) Oral every 12 hours  nystatin/triamcinolone Cream 1 Application(s) Topical every 12 hours  thiamine IVPB 500 milliGRAM(s) IV Intermittent every 24 hours    MEDICATIONS  (PRN):      Allergies    No Known Allergies    Intolerances        Vital Signs Last 24 Hrs  T(C): 36.9 (10 Apr 2019 05:29), Max: 37.4 (09 Apr 2019 09:14)  T(F): 98.4 (10 Apr 2019 05:29), Max: 99.4 (09 Apr 2019 20:45)  HR: 87 (10 Apr 2019 05:29) (75 - 93)  BP: 138/98 (10 Apr 2019 05:29) (130/84 - 138/98)  BP(mean): --  RR: 17 (10 Apr 2019 05:29) (15 - 18)  SpO2: 93% (10 Apr 2019 05:29) (92% - 95%)    PHYSICAL EXAM:  Neurologic:  Mental status: Awake, alert and oriented x3.   No dysarthria  Cranial nerves: Pupils equally round and reactive to light, ,no nystagmus, extraocular muscles intact, V1 through V3 intact bilaterally and symmetric, face symmetric, palate elevation symmetric, tongue was midline, sternocleidomastoid/shoulder shrug strength bilaterally 4/5.    Motor:  Normal bulk and tone and strength 4/5 in bilateral upper extremities.   strength 4/5.  Ankle strength is 1/5. Pt unable to  plantar /dorsi flex feet , hip flexion 3/5 .  Patients knee flexion extension 4/5.  Sensation: Intact to light touch   reflexes:  downgoing toes bilaterally Neurology f/u  PMH polio at age 6, breast cancer, diabetes mellitus, endometrial cancer stage 3, s/p exploratory laparotomy, enterolysis, SHAMIR, BSO, pelvic lymphadenectomy, low anterior resection mobilization of splenic flexure, end colostomy on 7/30/18 with rectovaginal fistula presenting from [facility] with fever. She has been on TPN and clears, has joseph in place.    She was previously admitted 2/22-3/25 for management of rectovaginal fistula.  She was initially kept NPO and was given TPN and octreotide daily as conservative approach to treat the fistula whilst providing nutrition. Nutrition team followed Pt daily and made recommendations for TPN and all adjustments necessary. On admission, Pt also found to have a perianal fungal infection most likely caused by stool leakage from vagina. Antifungal was applied at the region daily. Stool output from vagina significantly decreased during hospital stay. Also, urology was consulted for overflow incontinence so joseph catheter was placed. Urine culture was positive for ESBL e-coli and MRSA so Pt was put in isolation and received antibiotics.  Urine culture was repeated 72hrs after antibiotics was stopped per infectious disease recommendations and that came back as negative so isolation was not necessary anymore. Patient was discharged to [place] [facility].  Neurology consulted in setting of worsening LE weakness .    Interval events. EMG DONE,  showed evidence of demyelinating neuropathy. Patient currently on IVIG at 0.4gm/kg/day -will complete 5 days treatment. On day 5 today           MEDICATIONS  (STANDING):  chlorhexidine 2% Cloths 1 Application(s) Topical <User Schedule>  enoxaparin Injectable 40 milliGRAM(s) SubCutaneous daily  ertapenem  IVPB 1000 milliGRAM(s) IV Intermittent every 24 hours  immune   globulin gamma IVPB 25 Gram(s) IV Intermittent daily  insulin lispro (HumaLOG) corrective regimen sliding scale   SubCutaneous Before meals and at bedtime  megestrol 80 milliGRAM(s) Oral every 12 hours  nystatin/triamcinolone Cream 1 Application(s) Topical every 12 hours  thiamine IVPB 500 milliGRAM(s) IV Intermittent every 24 hours    MEDICATIONS  (PRN):      Allergies    No Known Allergies    Intolerances        Vital Signs Last 24 Hrs  T(C): 36.9 (10 Apr 2019 05:29), Max: 37.4 (09 Apr 2019 09:14)  T(F): 98.4 (10 Apr 2019 05:29), Max: 99.4 (09 Apr 2019 20:45)  HR: 87 (10 Apr 2019 05:29) (75 - 93)  BP: 138/98 (10 Apr 2019 05:29) (130/84 - 138/98)  BP(mean): --  RR: 17 (10 Apr 2019 05:29) (15 - 18)  SpO2: 93% (10 Apr 2019 05:29) (92% - 95%)    PHYSICAL EXAM:  Neurologic:  Mental status: Awake, alert and oriented x3.   No dysarthria  Cranial nerves: Pupils equally round and reactive to light, ,no nystagmus, extraocular muscles intact, V1 through V3 intact bilaterally and symmetric, face symmetric, palate elevation symmetric, tongue was midline, sternocleidomastoid/shoulder shrug strength bilaterally 4/5.    Motor:  Normal bulk and tone and strength 4/5 in bilateral upper extremities.   strength 4/5.  Ankle strength is 1/5. Pt unable to  plantar /dorsi flex feet , hip flexion 3/5 .  Patients knee flexion extension 4/5.  Sensation: Intact to light touch   reflexes:  downgoing toes bilaterally Neurology f/u  PMH polio at age 6, breast cancer, diabetes mellitus, endometrial cancer stage 3, s/p exploratory laparotomy, enterolysis, SHAMIR, BSO, pelvic lymphadenectomy, low anterior resection mobilization of splenic flexure, end colostomy on 7/30/18 with rectovaginal fistula presenting from [facility] [place] with fever. She has been on TPN and clears, has joseph in place.    She was previously admitted 2/22-3/25 for management of rectovaginal fistula.  She was initially kept NPO and was given TPN and octreotide daily as conservative approach to treat the fistula whilst providing nutrition. Nutrition team followed Pt daily and made recommendations for TPN and all adjustments necessary. On admission, Pt also found to have a perianal fungal infection most likely caused by stool leakage from vagina. Antifungal was applied at the region daily. Stool output from vagina significantly decreased during hospital stay. Also, urology was consulted for overflow incontinence so joseph catheter was placed. Urine culture was positive for ESBL e-coli and MRSA so Pt was put in isolation and received antibiotics.  Urine culture was repeated 72hrs after antibiotics was stopped per infectious disease recommendations and that came back as negative so isolation was not necessary anymore. Patient was discharged to [place] [facility].  Neurology consulted in setting of worsening LE weakness .    Interval events. EMG DONE,  showed evidence of demyelinating neuropathy. Patient currently on IVIG at 0.4gm/kg/day -will complete 5 days treatment. On day 5 today           MEDICATIONS  (STANDING):  chlorhexidine 2% Cloths 1 Application(s) Topical <User Schedule>  enoxaparin Injectable 40 milliGRAM(s) SubCutaneous daily  ertapenem  IVPB 1000 milliGRAM(s) IV Intermittent every 24 hours  immune   globulin gamma IVPB 25 Gram(s) IV Intermittent daily  insulin lispro (HumaLOG) corrective regimen sliding scale   SubCutaneous Before meals and at bedtime  megestrol 80 milliGRAM(s) Oral every 12 hours  nystatin/triamcinolone Cream 1 Application(s) Topical every 12 hours  thiamine IVPB 500 milliGRAM(s) IV Intermittent every 24 hours    MEDICATIONS  (PRN):      Allergies    No Known Allergies    Intolerances        Vital Signs Last 24 Hrs  T(C): 36.9 (10 Apr 2019 05:29), Max: 37.4 (09 Apr 2019 09:14)  T(F): 98.4 (10 Apr 2019 05:29), Max: 99.4 (09 Apr 2019 20:45)  HR: 87 (10 Apr 2019 05:29) (75 - 93)  BP: 138/98 (10 Apr 2019 05:29) (130/84 - 138/98)  BP(mean): --  RR: 17 (10 Apr 2019 05:29) (15 - 18)  SpO2: 93% (10 Apr 2019 05:29) (92% - 95%)    PHYSICAL EXAM:  Neurologic:  Mental status: Awake, alert and oriented x3.   No dysarthria  Cranial nerves: Pupils equally round and reactive to light, ,no nystagmus, extraocular muscles intact, V1 through V3 intact bilaterally and symmetric, face symmetric, palate elevation symmetric, tongue was midline, sternocleidomastoid/shoulder shrug strength bilaterally 4/5.    Motor:  Normal bulk and tone and strength 4/5 in bilateral upper extremities.   strength 4/5.  hip flexion 3/5 .  Patients knee flexion and extension 4/5. Patient unable to  plantar /dorsi flex  right foot however did have movement of left foot today with ability to dorsiflex   Sensation: Intact to light touch   reflexes:  downgoing toes bilaterally Neurology f/u  PMH polio at age 6, breast cancer, diabetes mellitus, endometrial cancer stage 3, s/p exploratory laparotomy, enterolysis, SHAMIR, BSO, pelvic lymphadenectomy, low anterior resection mobilization of splenic flexure, end colostomy on 7/30/18 with rectovaginal fistula presenting from [facility] with fever. She has been on TPN and clears, has joseph in place.    She was previously admitted 2/22-3/25 for management of rectovaginal fistula.  She was initially kept NPO and was given TPN and octreotide daily as conservative approach to treat the fistula whilst providing nutrition. Nutrition team followed Pt daily and made recommendations for TPN and all adjustments necessary. On admission, Pt also found to have a perianal fungal infection most likely caused by stool leakage from vagina. Antifungal was applied at the region daily. Stool output from vagina significantly decreased during hospital stay. Also, urology was consulted for overflow incontinence so joseph catheter was placed. Urine culture was positive for ESBL e-coli and MRSA so Pt was put in isolation and received antibiotics.  Urine culture was repeated 72hrs after antibiotics was stopped per infectious disease recommendations and that came back as negative so isolation was not necessary anymore. Patient was discharged to [facility] [facility].  Neurology consulted in setting of worsening LE weakness .    Interval events. EMG DONE,  showed evidence of demyelinating neuropathy. Patient currently on IVIG at 0.4gm/kg/day -will complete 5 days treatment. On day 5 today           MEDICATIONS  (STANDING):  chlorhexidine 2% Cloths 1 Application(s) Topical <User Schedule>  enoxaparin Injectable 40 milliGRAM(s) SubCutaneous daily  ertapenem  IVPB 1000 milliGRAM(s) IV Intermittent every 24 hours  immune   globulin gamma IVPB 25 Gram(s) IV Intermittent daily  insulin lispro (HumaLOG) corrective regimen sliding scale   SubCutaneous Before meals and at bedtime  megestrol 80 milliGRAM(s) Oral every 12 hours  nystatin/triamcinolone Cream 1 Application(s) Topical every 12 hours  thiamine IVPB 500 milliGRAM(s) IV Intermittent every 24 hours    Allergies  No Known Allergies    Vital Signs Last 24 Hrs  T(C): 36.9 (10 Apr 2019 05:29), Max: 37.4 (09 Apr 2019 09:14)  T(F): 98.4 (10 Apr 2019 05:29), Max: 99.4 (09 Apr 2019 20:45)  HR: 87 (10 Apr 2019 05:29) (75 - 93)  BP: 138/98 (10 Apr 2019 05:29) (130/84 - 138/98)  BP(mean): --  RR: 17 (10 Apr 2019 05:29) (15 - 18)  SpO2: 93% (10 Apr 2019 05:29) (92% - 95%)    PHYSICAL EXAM:  Neurologic:  Mental status: Awake, alert and oriented x3.   No dysarthria  Cranial nerves: Pupils equally round and reactive to light, ,no nystagmus, extraocular muscles intact, V1 through V3 intact bilaterally and symmetric, face symmetric, palate elevation symmetric, tongue was midline, sternocleidomastoid/shoulder shrug strength bilaterally 4/5.    Motor:  Normal bulk and tone and strength 4/5 in bilateral upper extremities.   strength 4/5.  hip flexion 3/5 .  Patients knee flexion and extension 4/5. Patient unable to  plantar /dorsi flex  right foot however did have movement of left foot today with ability to dorsiflex   Sensation: Intact to light touch   reflexes:  downgoing toes bilaterally

## 2019-04-10 NOTE — PROGRESS NOTE ADULT - SUBJECTIVE AND OBJECTIVE BOX
GYN PROGRESS NOTE PGY4    Patient evaluated at the bedside.   doing well today. finished IVIG (day 5/5) with improvement in status.  thiamine dose changed per neuro.  took a step with PT.  saturating well on room air.  per nutrition, she needs to increase her caloric intake.  discussed with patient need to have the glucerna shakes and more calories.    T(C): 36.8 (04-10-19 @ 16:26), Max: 37.4 (04-09-19 @ 20:45)  HR: 91 (04-10-19 @ 16:26) (76 - 95)  BP: 151/89 (04-10-19 @ 16:26) (134/85 - 151/89)  RR: 18 (04-10-19 @ 16:26) (16 - 18)  SpO2: 94% (04-10-19 @ 16:26) (92% - 94%)      Gen: No Acute Distress  Pulm: clear to auscultation bilaterally; decreased breath sounds bilaterally   GI: soft, appropriately tender, nondistended, +BS, no rebound, no guarding. Colostomy with brown output  Ext: SCDs in place, [facility]      04-09 @ 07:01  -  04-10 @ 07:00  --------------------------------------------------------  IN: 1600 mL / OUT: 1750 mL / NET: -150 mL    04-10 @ 07:01  -  04-10 @ 17:55  --------------------------------------------------------  IN: 730 mL / OUT: 1500 mL / NET: -770 mL      MEDICATIONS  (STANDING):  chlorhexidine 2% Cloths 1 Application(s) Topical <User Schedule>  ertapenem  IVPB 1000 milliGRAM(s) IV Intermittent every 24 hours  insulin lispro (HumaLOG) corrective regimen sliding scale   SubCutaneous Before meals and at bedtime  megestrol 80 milliGRAM(s) Oral two times a day  metFORMIN 1000 milliGRAM(s) Oral every 12 hours  nystatin/triamcinolone Cream 1 Application(s) Topical every 12 hours  thiamine IVPB 100 milliGRAM(s) IV Intermittent every 24 hours    MEDICATIONS  (PRN):

## 2019-04-10 NOTE — PROGRESS NOTE ADULT - SUBJECTIVE AND OBJECTIVE BOX
GYN Progress Note    Patient seen at bedside. No acute events overnight  Pain controlled on motrin.   Tolerating regular diet. Passing flatus.   OOB to chair.   Westbrook in place.     Denies CP, palpitations, SOB, fever, chills, nausea, vomiting.    Vital Signs Last 24 Hrs  T(C): 36.9 (10 Apr 2019 05:29), Max: 37.4 (09 Apr 2019 09:14)  T(F): 98.4 (10 Apr 2019 05:29), Max: 99.4 (09 Apr 2019 20:45)  HR: 87 (10 Apr 2019 05:29) (75 - 93)  BP: 138/98 (10 Apr 2019 05:29) (130/84 - 138/98)  BP(mean): --  RR: 17 (10 Apr 2019 05:29) (15 - 18)  SpO2: 93% (10 Apr 2019 05:29) (92% - 95%)    Physical Exam:  Gen: No Acute Distress  Pulm: clear to auscultation bilaterally; decreased breath sounds bilaterally   GI: soft, appropriately tender, nondistended, +BS, no rebound, no guarding. Colostomy with brown output  Ext: SCDs in place, [facility]    I&O's Summary    08 Apr 2019 07:01  -  09 Apr 2019 07:00  --------------------------------------------------------  IN: 250 mL / OUT: 1875 mL / NET: -1625 mL    09 Apr 2019 07:01  -  10 Apr 2019 06:40  --------------------------------------------------------  IN: 1600 mL / OUT: 1750 mL / NET: -150 mL      MEDICATIONS  (STANDING):  chlorhexidine 2% Cloths 1 Application(s) Topical <User Schedule>  enoxaparin Injectable 40 milliGRAM(s) SubCutaneous daily  ertapenem  IVPB 1000 milliGRAM(s) IV Intermittent every 24 hours  immune   globulin gamma IVPB 25 Gram(s) IV Intermittent daily  insulin lispro (HumaLOG) corrective regimen sliding scale   SubCutaneous Before meals and at bedtime  megestrol 80 milliGRAM(s) Oral every 12 hours  nystatin/triamcinolone Cream 1 Application(s) Topical every 12 hours  thiamine IVPB 500 milliGRAM(s) IV Intermittent every 24 hours    MEDICATIONS  (PRN):      LABS:                        7.0    5.03  )-----------( 254      ( 08 Apr 2019 08:00 )             23.7     04-08    140  |  97  |  12  ----------------------------<  160<H>  4.0   |  35<H>  |  0.54    Ca    9.2      08 Apr 2019 07:59  Phos  4.3     04-08  Mg     1.8     04-08    TPro  7.5  /  Alb  2.9<L>  /  TBili  0.2  /  DBili  x   /  AST  70<H>  /  ALT  165<H>  /  AlkPhos  364<H>  04-08    PT/INR - ( 08 Apr 2019 07:37 )   PT: 11.2 sec;   INR: 0.99          PTT - ( 08 Apr 2019 07:37 )  PTT:30.5 sec

## 2019-04-10 NOTE — PROGRESS NOTE ADULT - ASSESSMENT
60yo F HD9 with stage IV endometrial adenocarcinoma s/p staging surgery '18 and 1 round of chemotherapy 2/19 readmitted from [facility] with fever previously admitted for management of symptomatic rectovaginal and enterovaginal fistulas 2/22-3/25 now bacteremic. Last fever at 0855am 4/1.       1. 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 will speak with Dr. Sellers today regarding plan  2. Pulm - cough and SOB improved,  saturating 92-95% on RA overnight; noted to have nodule in lung on CT, will f/u outpatient  Pulmonology Consulted- finding could be pneumonia vs atelectasis, currently on appropriate antibiotic treatment and improving and no pulm intervention needed at this time; Lower extremity dopplers negative  3. Cardio- Echo: within normal limits   4. FEN/GI - Reg diet. s/p TPN. PICC line removed - PICC tip cx grew MRSA; s/p vancomycin   - elevated liver enzymes improved now increasing- GI consulted appreciate recs, RUQ sono- gallbladder wall thickening and sludge- no gallstones and hepatic vein patent   - No current active leaking of stool  - replete electrolytes as needed   - monitor strict I/O and nutritional intake w/ calorie counts   - nutrition consulted will follow calorie counts in order to make suggestions, at this time recommend 1400calories per day, 66g protein, Glucerna shakes TID   5.  - Westbrook placed given urinary retention 3/16  - Perianal irritation consistent with possible fungal infection- continue nystatin cream 3/23; apply to affected area as needed, improving w/ less irritation   - CT abdomen/pelvis w/ IV and oral contrast performed 3/15 showed improved enterovaginal fistula, no contrast in vagina, repeat CT on 4/5 showed no contrast in vagina again   6. ID: Bacteremia- Blood culture 4/1 positive for gram neg rods, coag neg staph, proteus. Blood culture from PICC 4/3 positive for Proteus ESBL  PICC tip cx significant for MRSA - s/p vancomycin 4/7-4/9  Blood cultures NGTD  ID following- continue IV 1g qd Ertapenem 4/4 until 4/16     Previously s/p Vancomycin; s/p Zosyn (4/1-4/3), and Meropenem (4/3-4/4)  7. Endocrine- ISS, has not taken home metformin in >1 month, monitor FS; endocrine consulted last hospital stay for workup of possible DI which was negative per team   8. VTE prophylaxis - SCDs, ambulate as tolerated, Lovenox 40 daily, PT following    9. GYN malignancy  -will stop anastrazole and proceed with 3 weeks of megace 80mg BID followed by 3 weeks of tamoxifen. Will not give faslodex  10. Derm: monitor sacrum for s/s of pressure ulcer. encourage frequent changes in position and OOB during  the day. 60yo F HD10 with stage IV endometrial adenocarcinoma s/p staging surgery '18 and 1 round of chemotherapy 2/19 readmitted from [facility] with fever previously admitted for management of symptomatic rectovaginal and enterovaginal fistulas 2/22-3/25 now bacteremic. Last fever at 0855am 4/1.       1. 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 will speak with Dr. Sellers today regarding plan, also would appreciate neuro recs regarding dispo placement and best LITO for her condition as well as if any further assessment of her neurogenic bladder is needed  2. Pulm - cough and SOB improved,  saturating 92-95% on RA overnight; noted to have nodule in lung on CT, will f/u outpatient  Pulmonology Consulted- finding could be pneumonia vs atelectasis, currently on appropriate antibiotic treatment and improving and no pulm intervention needed at this time; Lower extremity dopplers negative  3. Cardio- Echo: within normal limits   4. FEN/GI - Reg diet. s/p TPN. PICC line removed - PICC tip cx grew MRSA; s/p vancomycin   - elevated liver enzymes improved now increasing- GI consulted appreciate recs, RUQ sono- gallbladder wall thickening and sludge- no gallstones and hepatic vein patent   - No current active leaking of stool  - replete electrolytes as needed   - monitor strict I/O and nutritional intake w/ calorie counts   - nutrition consulted will follow calorie counts in order to make suggestions, at this time recommend 1400calories per day, 66g protein, Glucerna shakes TID   5.  - Westbrook placed given urinary retention 3/16  - Perianal irritation consistent with possible fungal infection- continue nystatin cream 3/23; apply to affected area as needed, improving w/ less irritation   - CT abdomen/pelvis w/ IV and oral contrast performed 3/15 showed improved enterovaginal fistula, no contrast in vagina, repeat CT on 4/5 showed no contrast in vagina again   6. ID: Bacteremia- Blood culture 4/1 positive for gram neg rods, coag neg staph, proteus. Blood culture from PICC 4/3 positive for Proteus ESBL  PICC tip cx significant for MRSA - s/p vancomycin 4/7-4/9  Blood cultures NGTD  ID following- continue IV 1g qd Ertapenem 4/4 until 4/16     Previously s/p Vancomycin; s/p Zosyn (4/1-4/3), and Meropenem (4/3-4/4)  7. Endocrine- ISS, has not taken home metformin in >1 month, monitor FS; endocrine consulted last hospital stay for workup of possible DI which was negative per team, pt still having significant uop   initiated on FS and ISS again yesterday, overall FS controlled  8. VTE prophylaxis - SCDs, ambulate as tolerated, Lovenox 40 daily, PT following    9. GYN malignancy  -will stop anastrazole and proceed with 3 weeks of megace 80mg BID followed by 3 weeks of tamoxifen. Will not give faslodex. will start megace today.  10. Derm: monitor sacrum for s/s of pressure ulcer. encourage frequent changes in position and OOB during  the day.

## 2019-04-10 NOTE — PROGRESS NOTE ADULT - ATTENDING COMMENTS
Strength continues to improve gradually  May be IVIG, or general improvement from bacteremia / sepsis from antibiotics  Last day IVIG today  No evidence of hemolysis on labs  Will continue to follow while inpatient, after d/c should f/u with me in the office and get maintenance IVIG (1gm/kg divided over two days) 4 weeks after today.

## 2019-04-10 NOTE — PROGRESS NOTE ADULT - ASSESSMENT
60yo F HD10 with stage IV endometrial adenocarcinoma s/p staging surgery '18 and 1 round of chemotherapy 2/19 readmitted from [facility], [place] with fever previously admitted for management of symptomatic rectovaginal and enterovaginal fistulas 2/22-3/25 now bacteremic. Last fever at 0855am 4/1.       1. 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 will speak with Dr. Sellers today regarding plan, also would appreciate neuro recs regarding dispo placement and best LITO for her condition (per resident will ask Dr. Sellers if he has any recommendation), as well as if any further assessment of her neurogenic bladder is needed (neuro agrees with primary team that the retention is likely NOT neurogenic).  2. Pulm - cough and SOB improved,  saturating 92-95% on RA overnight; noted to have nodule in lung on CT, will f/u outpatient  Pulmonology Consulted- finding could be pneumonia vs atelectasis, currently on appropriate antibiotic treatment and improving and no pulm intervention needed at this time; Lower extremity dopplers negative  3. Cardio- Echo: within normal limits   4. FEN/GI - Reg diet. s/p TPN. PICC line removed - PICC tip cx grew MRSA; s/p vancomycin   - elevated liver enzymes improved now increasing- GI consulted appreciate recs, RUQ sono- gallbladder wall thickening and sludge- no gallstones and hepatic vein patent   - No current active leaking of stool  - replete electrolytes as needed   - monitor strict I/O and nutritional intake w/ calorie counts   - nutrition consulted will follow calorie counts in order to make suggestions, at this time recommend 1400calories per day, 66g protein, Glucerna shakes TID  pt not meeting requirements, encouraged to have shakes and increase calories, smoothie  5.  - Westbrook placed given urinary retention 3/16  - reconsult before discharge?  - Perianal irritation consistent with possible fungal infection- continue nystatin cream 3/23; apply to affected area as needed, improving w/ less irritation   - CT abdomen/pelvis w/ IV and oral contrast performed 3/15 showed improved enterovaginal fistula, no contrast in vagina, repeat CT on 4/5 showed no contrast in vagina again   6. ID: Bacteremia- Blood culture 4/1 positive for gram neg rods, coag neg staph, proteus. Blood culture from PICC 4/3 positive for Proteus ESBL  PICC tip cx significant for MRSA - s/p vancomycin 4/7-4/9  Blood cultures NGTD  ID following- continue IV 1g qd Ertapenem 4/4 until 4/16     Previously s/p Vancomycin; s/p Zosyn (4/1-4/3), and Meropenem (4/3-4/4)  7. Endocrine- ISS, has not taken home metformin in >1 month, monitor FS; endocrine consulted last hospital stay for workup of possible DI which was negative per team, pt still having significant uop   initiated on FS and ISS again yesterday, overall FS controlled  Metformin 1000mg BID ordered. the purpose is that it improves outcomes in cancer patients with diabetes. This happens to be her home metformin dose for diabetes. f/u FS.  8. VTE prophylaxis - SCDs, ambulate as tolerated, Lovenox 40 daily, PT following    9. GYN malignancy  -will stop anastrazole and proceed with 3 weeks of megace 80mg BID followed by 3 weeks of tamoxifen. Will not give faslodex. will start megace today. and metformin. explained to patient.  10. Derm: monitor sacrum for s/s of pressure ulcer. encourage frequent changes in position and OOB during  the day.

## 2019-04-10 NOTE — PROGRESS NOTE ADULT - ASSESSMENT
60yo F with stage IV endometrial adenocarcinoma s/p staging surgery '18 and 1 round of chemotherapy 2/19, previously admitted for management of symptomatic rectovaginal and enterovaginal fistulas 2/22-3/25, now presenting from [facility] with fever.   Neurology consulted in setting of worsening LE weakness . s/p EMG     -Etiology of LE weakness includes post polio syndrome versus CIDP , with element of deconditioning. Post polio is low on differential now given EMG findings   -EMG showed evidence of demyelinating neuropathy and CIDP-spectrum disorder is in differential   -Pt is currently improving on IVIG treatment with improving strength as per patient. She is still unable to dorsi or plantar flex both feet however some movement noted in left feet today.   -patient currently on IVIG at 0.4gm/kg/day -will complete 5 days treatment. She is currently on day 5 of treatment.   -Pt also on thiamine , will continue till B1 levels result (currently pending )   -neurology will follow for improvement 58yo F with stage IV endometrial adenocarcinoma s/p staging surgery '18 and 1 round of chemotherapy 2/19, previously admitted for management of symptomatic rectovaginal and enterovaginal fistulas 2/22-3/25, now presenting from [facility] with fever.   Neurology consulted in setting of worsening LE weakness . s/p EMG     -Etiology of LE weakness includes neuropathy secondary to DM, chemo use vs post polio syndrome versus CIDP , with element of deconditioning. Post polio is low on differential now given EMG findings . There may be a component of cauda equina compression causing distal foot weakness as there is significant lumbar central canal stenosis seen on imaging.   -EMG showed evidence of demyelinating neuropathy and CIDP-spectrum disorder is in differential   -Pt is currently improving on IVIG treatment with improving strength as per patient. Improved movement noted in left feet today. Pt was able to dorsiflex her left foot   -patient currently on IVIG . She is currently on day 5 of treatment.   -No more additional IVIG after today's dose, patient to get maintenance dose after 4 weeks with Dr Sellers outpatient.  -can lower dose of thiamine to 100 mg q 24 , will continue till  B1 levels result  -neurology will follow for improvement

## 2019-04-11 LAB
ALBUMIN SERPL ELPH-MCNC: 3.2 G/DL — LOW (ref 3.3–5)
ALP SERPL-CCNC: 245 U/L — HIGH (ref 40–120)
ALT FLD-CCNC: 81 U/L — HIGH (ref 10–45)
ANION GAP SERPL CALC-SCNC: 9 MMOL/L — SIGNIFICANT CHANGE UP (ref 5–17)
APTT BLD: 32.8 SEC — SIGNIFICANT CHANGE UP (ref 27.5–36.3)
AST SERPL-CCNC: 32 U/L — SIGNIFICANT CHANGE UP (ref 10–40)
BILIRUB SERPL-MCNC: 0.3 MG/DL — SIGNIFICANT CHANGE UP (ref 0.2–1.2)
BLD GP AB SCN SERPL QL: NEGATIVE — SIGNIFICANT CHANGE UP
BUN SERPL-MCNC: 14 MG/DL — SIGNIFICANT CHANGE UP (ref 7–23)
CALCIUM SERPL-MCNC: 9.7 MG/DL — SIGNIFICANT CHANGE UP (ref 8.4–10.5)
CHLORIDE SERPL-SCNC: 98 MMOL/L — SIGNIFICANT CHANGE UP (ref 96–108)
CO2 SERPL-SCNC: 34 MMOL/L — HIGH (ref 22–31)
CREAT SERPL-MCNC: 0.49 MG/DL — LOW (ref 0.5–1.3)
GLUCOSE BLDC GLUCOMTR-MCNC: 134 MG/DL — HIGH (ref 70–99)
GLUCOSE BLDC GLUCOMTR-MCNC: 154 MG/DL — HIGH (ref 70–99)
GLUCOSE BLDC GLUCOMTR-MCNC: 160 MG/DL — HIGH (ref 70–99)
GLUCOSE BLDC GLUCOMTR-MCNC: 212 MG/DL — HIGH (ref 70–99)
GLUCOSE SERPL-MCNC: 133 MG/DL — HIGH (ref 70–99)
HAPTOGLOB SERPL-MCNC: 271 MG/DL — HIGH (ref 34–200)
HCT VFR BLD CALC: 27.1 % — LOW (ref 34.5–45)
HGB BLD-MCNC: 8 G/DL — LOW (ref 11.5–15.5)
INR BLD: 1.04 — SIGNIFICANT CHANGE UP (ref 0.88–1.16)
LDH SERPL L TO P-CCNC: 134 U/L — SIGNIFICANT CHANGE UP (ref 50–242)
MAGNESIUM SERPL-MCNC: 1.8 MG/DL — SIGNIFICANT CHANGE UP (ref 1.6–2.6)
MCHC RBC-ENTMCNC: 29.5 GM/DL — LOW (ref 32–36)
MCHC RBC-ENTMCNC: 30.3 PG — SIGNIFICANT CHANGE UP (ref 27–34)
MCV RBC AUTO: 102.7 FL — HIGH (ref 80–100)
NRBC # BLD: 0 /100 WBCS — SIGNIFICANT CHANGE UP (ref 0–0)
PHOSPHATE SERPL-MCNC: 5.1 MG/DL — HIGH (ref 2.5–4.5)
PLATELET # BLD AUTO: 254 K/UL — SIGNIFICANT CHANGE UP (ref 150–400)
POTASSIUM SERPL-MCNC: 4.4 MMOL/L — SIGNIFICANT CHANGE UP (ref 3.5–5.3)
POTASSIUM SERPL-SCNC: 4.4 MMOL/L — SIGNIFICANT CHANGE UP (ref 3.5–5.3)
PROT SERPL-MCNC: 9 G/DL — HIGH (ref 6–8.3)
PROTHROM AB SERPL-ACNC: 11.8 SEC — SIGNIFICANT CHANGE UP (ref 10–12.9)
RBC # BLD: 2.64 M/UL — LOW (ref 3.8–5.2)
RBC # BLD: 2.64 M/UL — LOW (ref 3.8–5.2)
RBC # FLD: 17.1 % — HIGH (ref 10.3–14.5)
RETICS #: 187.2 K/UL — HIGH (ref 25–125)
RETICS/RBC NFR: 7.1 % — HIGH (ref 0.5–2.5)
RH IG SCN BLD-IMP: POSITIVE — SIGNIFICANT CHANGE UP
SODIUM SERPL-SCNC: 141 MMOL/L — SIGNIFICANT CHANGE UP (ref 135–145)
VIT B1 SERPL-MCNC: 138 NMOL/L — SIGNIFICANT CHANGE UP (ref 66.5–200)
WBC # BLD: 5.91 K/UL — SIGNIFICANT CHANGE UP (ref 3.8–10.5)
WBC # FLD AUTO: 5.91 K/UL — SIGNIFICANT CHANGE UP (ref 3.8–10.5)

## 2019-04-11 PROCEDURE — 99232 SBSQ HOSP IP/OBS MODERATE 35: CPT

## 2019-04-11 PROCEDURE — 99231 SBSQ HOSP IP/OBS SF/LOW 25: CPT

## 2019-04-11 RX ORDER — DIPHENHYDRAMINE HCL 50 MG
25 CAPSULE ORAL ONCE
Qty: 0 | Refills: 0 | Status: COMPLETED | OUTPATIENT
Start: 2019-04-11 | End: 2019-04-11

## 2019-04-11 RX ORDER — MAGNESIUM SULFATE 500 MG/ML
2 VIAL (ML) INJECTION ONCE
Qty: 0 | Refills: 0 | Status: COMPLETED | OUTPATIENT
Start: 2019-04-11 | End: 2019-04-11

## 2019-04-11 RX ORDER — DIPHENHYDRAMINE HCL 50 MG
25 CAPSULE ORAL ONCE
Qty: 0 | Refills: 0 | Status: DISCONTINUED | OUTPATIENT
Start: 2019-04-11 | End: 2019-04-11

## 2019-04-11 RX ADMIN — ENOXAPARIN SODIUM 40 MILLIGRAM(S): 100 INJECTION SUBCUTANEOUS at 12:18

## 2019-04-11 RX ADMIN — Medication 1: at 12:18

## 2019-04-11 RX ADMIN — Medication 2: at 17:09

## 2019-04-11 RX ADMIN — MEGESTROL ACETATE 80 MILLIGRAM(S): 40 SUSPENSION ORAL at 17:12

## 2019-04-11 RX ADMIN — METFORMIN HYDROCHLORIDE 1000 MILLIGRAM(S): 850 TABLET ORAL at 06:10

## 2019-04-11 RX ADMIN — Medication 1 APPLICATION(S): at 06:10

## 2019-04-11 RX ADMIN — METFORMIN HYDROCHLORIDE 1000 MILLIGRAM(S): 850 TABLET ORAL at 17:11

## 2019-04-11 RX ADMIN — Medication 25 MILLIGRAM(S): at 21:37

## 2019-04-11 RX ADMIN — Medication 1 APPLICATION(S): at 17:10

## 2019-04-11 RX ADMIN — MEGESTROL ACETATE 80 MILLIGRAM(S): 40 SUSPENSION ORAL at 06:13

## 2019-04-11 RX ADMIN — Medication 1: at 21:37

## 2019-04-11 RX ADMIN — CHLORHEXIDINE GLUCONATE 1 APPLICATION(S): 213 SOLUTION TOPICAL at 06:15

## 2019-04-11 RX ADMIN — ERTAPENEM SODIUM 120 MILLIGRAM(S): 1 INJECTION, POWDER, LYOPHILIZED, FOR SOLUTION INTRAMUSCULAR; INTRAVENOUS at 17:12

## 2019-04-11 RX ADMIN — Medication 50 GRAM(S): at 10:09

## 2019-04-11 NOTE — CHART NOTE - NSCHARTNOTEFT_GEN_A_CORE
Admitting Diagnosis:   Patient is a 59y old  Female who presents with a chief complaint of Fevers (11 Apr 2019 15:53)      PAST MEDICAL & SURGICAL HISTORY:      Current Nutrition Order:   Regular w/ Glucerna TID (660kcal, 30g pro)    PO Intake: Good (%) [   ]  Fair (50-75%) [ x ] Poor (<25%) [   ]    CALORIE COUNT:  EER = 1190-1428kcal/day & 57-66g/day).   Day 1 (1 meal filled out): 188kcal, 8g  Day 2: 820kcal, 39g.--> 69% of EER   Day 3: 597kcal, 19.4g. --> 50% of EER  Day 4 (2 meals filled out): 732kcal, 36.5g  Pt with fluctuating intake which had shown more improvement on the last day 4/9 even though only 2 meals were tracked.   If pt able to consume glucerna 2-3x/day + current intake from today, she will likely be able to meet >/=75% of EER.   ***Given that pt was long-term PN dependant, it can take some time for appetite to fully return to normal. Pt is understanding of no longer receiving PN support and is mindfully increasing intake.     GI Issues:   Denies N/V  Experiencing early satiety but intentionally eating more  No c/o acid reflux at this assessment   High ostomy output today per RN    Pain:  No pain reported.     Skin Integrity:  Ostomy in place  Surgical incision    Labs:   04-11    141  |  98  |  14  ----------------------------<  133<H>  4.4   |  34<H>  |  0.49<L>    Ca    9.7      11 Apr 2019 05:53  Phos  5.1     04-11  Mg     1.8     04-11    TPro  9.0<H>  /  Alb  3.2<L>  /  TBili  0.3  /  DBili  x   /  AST  32  /  ALT  81<H>  /  AlkPhos  245<H>  04-11    CAPILLARY BLOOD GLUCOSE      POCT Blood Glucose.: 212 mg/dL (11 Apr 2019 16:47)  POCT Blood Glucose.: 154 mg/dL (11 Apr 2019 11:50)  POCT Blood Glucose.: 134 mg/dL (11 Apr 2019 07:57)  POCT Blood Glucose.: 173 mg/dL (10 Apr 2019 21:46)      Medications:  MEDICATIONS  (STANDING):  chlorhexidine 2% Cloths 1 Application(s) Topical <User Schedule>  enoxaparin Injectable 40 milliGRAM(s) SubCutaneous every 24 hours  ertapenem  IVPB 1000 milliGRAM(s) IV Intermittent every 24 hours  insulin lispro (HumaLOG) corrective regimen sliding scale   SubCutaneous Before meals and at bedtime  megestrol 80 milliGRAM(s) Oral two times a day  metFORMIN 1000 milliGRAM(s) Oral every 12 hours  nystatin/triamcinolone Cream 1 Application(s) Topical every 12 hours    MEDICATIONS  (PRN):      Weight: 134lbs  Height: 5'1", IBW 105lbs+/-10%, %%, BMI 25.5    Weight Change:   Wt appears stable since last admission indicating PN infusion was adequate.  Please obtain updated weight     Estimated energy needs:   IBW used for calculations as pt >120% of IBW   Nutrient needs based on [facility] standards of care for maintenance in adults.   Needs adjusted for catabolic illness and chemo. Recommend utilizing upper range of estimated needs  1190-1428kcal/day (25-30kcal/kg)  57-67g pro/day (1.2-1.4g/kg)  1190-1428ml fluid/day (25-30ml/kg)    Subjective:   58yo F HD5 readmitted from [facility] with fever, was d/c'd to there for apprx 1 week after prolonged hospital stay, w/ stage IV endometrial adenocarcinoma s/p staging surgery '18 and 1 round of chemotherapy 2/19. Pt was previously admitted for management of symptomatic rectovaginal and enterovaginal fistulas 2/22-3/25. CT A/P w/ IV and PO contrast (3/15) showed improved enterovaginal fistula, no longer contrast in vagina. TPN d/c'd 4/5. Ordered for regular diet and tolerating PO. Calorie count ended 4/9 (see analysis above). Pt is actively trying to increase intake as she does not want to rely on TPN. Discussed w/ pt that it can be process having a return of appetite after being on long term TPN. Pt seen today, resting in chair, eating a hamburger. Concerned that Glucerna is causing diarrhea- diarrhea could be d/t ABX, but discussed diluting drinks w/ water to make them less osmotic. Pt was receptive and agreeable. Reviewed ideal protein options and encouraged again small frequent meals. Pt stated she is going to finish her burger and save the glucerna as a snack. Based on calorie count- pt is capable of meeting ~75% of estimated needs if picking optimal nutrient dense foods. Suspect appetite will continue to improve overtime as stomach is not used to current volume of food. Please trend weights. RD to follow.     Previous Nutrition Diagnosis:  inadequate oral intake RT difficulty meeting needs d/t early satiety w/ PO intake 2/2 prior PN dependance AEB pt consuming 25-50% of meals per diet recall  Active [   ]  Resolved [ x  ]    If resolved, new PES:   inadequate oral intake RT difficulty meeting needs d/t early satiety w/ PO intake 2/2 prior PN dependance AEB pt consuming 50-75% of meals per diet recall    Goal:  Pt to consistently meet % of estimated needs PO     Recommendations:  1) Continue w/ Glucerna TID (660kcal, 30g pro)- encourage between meals and dilute w/ water if causing diarrhea  2) MVI daily, now that no longer getting it in TPN  3) Monitor and replete lytes PRN  4) Encourage sitting up at meals 2/2 acid reflux  5) Obtain new wt  6) Encourage small frequent meals vs 3 large ones    Education:   small frequent meals, diluting glucerna    Risk Level: High [   ] Moderate [ x  ] Low [   ]

## 2019-04-11 NOTE — PROGRESS NOTE ADULT - ASSESSMENT
58yo F HD11 with stage IV endometrial adenocarcinoma s/p staging surgery '18 and 1 round of chemotherapy 2/19 readmitted from [facility] with fever previously admitted for management of symptomatic rectovaginal and enterovaginal fistulas 2/22-3/25 now bacteremic. Last fever at 0855am 4/1.       1. 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 discuss [facility] recs for neuro patients.  2. Pulm - cough and SOB improved,  saturating 92-95% on RA overnight; noted to have nodule in lung on CT, will f/u outpatient  Pulmonology Consulted- finding could be pneumonia vs atelectasis, currently on appropriate antibiotic treatment and improving and no pulm intervention needed at this time; Lower extremity dopplers negative  3. Cardio- Echo: within normal limits   4. FEN/GI - Reg diet. s/p TPN. PICC line removed - PICC tip cx grew MRSA; s/p vancomycin   - elevated liver enzymes improved now increasing- GI consulted appreciate recs, RUQ sono- gallbladder wall thickening and sludge- no gallstones and hepatic vein patent   - No current active leaking of stool  - replete electrolytes as needed   - monitor strict I/O and nutritional intake w/ calorie counts   - nutrition consulted will follow calorie counts in order to make suggestions, at this time recommend 1400calories per day, 66g protein, Glucerna shakes TID  pt not meeting requirements, encouraged to have shakes and increase calories, smoothie  5.  - Westbrook placed given urinary retention 3/16  - reconsult before discharge?  - Perianal irritation consistent with possible fungal infection- continue nystatin cream 3/23; apply to affected area as needed, improving w/ less irritation   - CT abdomen/pelvis w/ IV and oral contrast performed 3/15 showed improved enterovaginal fistula, no contrast in vagina, repeat CT on 4/5 showed no contrast in vagina again   6. ID: Bacteremia- Blood culture 4/1 positive for gram neg rods, coag neg staph, proteus. Blood culture from PICC 4/3 positive for Proteus ESBL  PICC tip cx significant for MRSA - s/p vancomycin 4/7-4/9  Blood cultures NGTD  ID following- continue IV 1g qd Ertapenem 4/4 until 4/16     Previously s/p Vancomycin; s/p Zosyn (4/1-4/3), and Meropenem (4/3-4/4)  7. Endocrine- ISS, has not taken home metformin in >1 month, monitor FS; endocrine consulted last hospital stay for workup of possible DI which was negative per team, pt still having significant uop   initiated on FS and ISS again yesterday, overall FS controlled  Metformin 1000mg BID ordered. the purpose is that it improves outcomes in cancer patients with diabetes. This happens to be her home metformin dose for diabetes. f/u FS.  8. VTE prophylaxis - SCDs, ambulate as tolerated, Lovenox 40 daily, PT following    9. GYN malignancy  -will stop anastrazole and proceed with 3 weeks of megace 80mg BID followed by 3 weeks of tamoxifen. Will not give faslodex. started megace and metformin 4/10.  10. Derm: monitor sacrum for s/s of pressure ulcer. encourage frequent changes in position and OOB during  the day.     *sw - consider discharge planning for next week

## 2019-04-11 NOTE — PROGRESS NOTE ADULT - SUBJECTIVE AND OBJECTIVE BOX
Heme/Onc Progress Note (Dr. García )    Interval History: No fevers overnight, no new episodes of bleeding noted. S/p 5 days of IVIG, on ABX for bacteremia.       ROS is otherwise negative.      Allergies    No Known Allergies    Intolerances    Medications:  MEDICATIONS  (STANDING):  chlorhexidine 2% Cloths 1 Application(s) Topical <User Schedule>  enoxaparin Injectable 40 milliGRAM(s) SubCutaneous every 24 hours  ertapenem  IVPB 1000 milliGRAM(s) IV Intermittent every 24 hours  insulin lispro (HumaLOG) corrective regimen sliding scale   SubCutaneous Before meals and at bedtime  megestrol 80 milliGRAM(s) Oral two times a day  metFORMIN 1000 milliGRAM(s) Oral every 12 hours  nystatin/triamcinolone Cream 1 Application(s) Topical every 12 hours    MEDICATIONS  (PRN):    enoxaparin Injectable 40 milliGRAM(s) SubCutaneous every 24 hours          PHYSICAL EXAM:    T(F): 98.6 (04-11-19 @ 14:51), Max: 98.8 (04-10-19 @ 20:30)  HR: 99 (04-11-19 @ 14:51) (88 - 99)  BP: 118/75 (04-11-19 @ 14:51) (116/81 - 151/89)  RR: 17 (04-11-19 @ 14:51) (17 - 18)  SpO2: 94% (04-11-19 @ 14:51) (93% - 100%)  Wt(kg): --    Daily     Daily       GEN: Pale, sitting in chair  HEENT:AT/NC   CVS:+S1, S2, RRR   LUNG: CTA b/L   GI: +BS, no ostomy no bloody outpt   EXT; no c/c/e, picc line discontinued, no local skin infx  NEURO: aaox3    Labs:                          8.0    5.91  )-----------( 254      ( 11 Apr 2019 05:53 )             27.1     CBC Full  -  ( 11 Apr 2019 05:53 )  WBC Count : 5.91 K/uL  RBC Count : 2.64 M/uL  Hemoglobin : 8.0 g/dL  Hematocrit : 27.1 %  Platelet Count - Automated : 254 K/uL  Mean Cell Volume : 102.7 fl  Mean Cell Hemoglobin : 30.3 pg  Mean Cell Hemoglobin Concentration : 29.5 gm/dL  Auto Neutrophil # : x  Auto Lymphocyte # : x  Auto Monocyte # : x  Auto Eosinophil # : x  Auto Basophil # : x  Auto Neutrophil % : x  Auto Lymphocyte % : x  Auto Monocyte % : x  Auto Eosinophil % : x  Auto Basophil % : x    PT/INR - ( 11 Apr 2019 05:53 )   PT: 11.8 sec;   INR: 1.04          PTT - ( 11 Apr 2019 05:53 )  PTT:32.8 sec    04-11    141  |  98  |  14  ----------------------------<  133<H>  4.4   |  34<H>  |  0.49<L>    Ca    9.7      11 Apr 2019 05:53  Phos  5.1     04-11  Mg     1.8     04-11    TPro  9.0<H>  /  Alb  3.2<L>  /  TBili  0.3  /  DBili  x   /  AST  32  /  ALT  81<H>  /  AlkPhos  245<H>  04-11

## 2019-04-11 NOTE — PROGRESS NOTE ADULT - ASSESSMENT
60yo F with stage IV endometrial adenocarcinoma s/p staging surgery '18 and 1 round of chemotherapy 2/19, previously admitted for management of symptomatic rectovaginal and enterovaginal fistulas 2/22-3/25, now presenting from [facility] with fever.   Neurology consulted in setting of worsening LE weakness . s/p EMG     -Etiology of LE weakness includes neuropathy secondary to DM, chemo use vs post polio syndrome versus CIDP , with element of deconditioning. Post polio is low on differential now given EMG findings . There may be a component of cauda equina compression causing distal foot weakness as there is significant lumbar central canal stenosis seen on imaging.   -EMG showed evidence of demyelinating neuropathy and CIDP-spectrum disorder is in differential   -Pt s/p 5 day of IVIG treatment, it appears she has some improved  strength as per patient. She worked with PT yesterday and was able to take a step. There is definitely improved movement noted in her left foot   - patient to get maintenance dose after 4 weeks with Dr Sellers outpatient.  -B1 levels normal , can d/c thiamine for now   -neurology will follow for improvement 60yo F with stage IV endometrial adenocarcinoma s/p staging surgery '18 and 1 round of chemotherapy 2/19, previously admitted for management of symptomatic rectovaginal and enterovaginal fistulas 2/22-3/25, now presenting from [facility] with fever.   Neurology consulted in setting of worsening LE weakness . s/p EMG     -Etiology of LE weakness includes neuropathy secondary to DM, chemo use vs post polio syndrome versus CIDP , with element of deconditioning. Post polio is low on differential now given EMG findings . There may be a component of cauda equina compression causing distal foot weakness as there is significant lumbar central canal stenosis seen on imaging.   -EMG showed evidence of demyelinating neuropathy and CIDP-spectrum disorder is in differential   -Pt s/p 5 day of IVIG treatment, it appears she has some improved  strength as per patient. She worked with PT yesterday and was able to take a step. There is definitely improved movement noted in her left foot   - patient to get maintenance dose after 4 weeks with Dr Sellers outpatient.  -B1 levels normal , can d/c thiamine for now   -neurology will sign off 58yo F with stage IV endometrial adenocarcinoma s/p staging surgery '18 and 1 round of chemotherapy 2/19, previously admitted for management of symptomatic rectovaginal and enterovaginal fistulas 2/22-3/25, now presenting from [facility] with fever.   Neurology consulted in setting of worsening LE weakness . s/p EMG     -Etiology of LE weakness includes neuropathy secondary to DM, chemo use vs post polio syndrome versus CIDP , with element of deconditioning. Post polio is low on differential now given EMG findings . There may be a component of cauda equina compression causing distal foot weakness as there is significant lumbar central canal stenosis seen on imaging.   -EMG showed evidence of demyelinating neuropathy and CIDP-spectrum disorder is in differential   -Pt s/p 5 day of IVIG treatment, it appears she has some improved  strength as per patient. She worked with PT yesterday and was able to take a step. There is definitely improved movement noted in her left foot   - patient to get maintenance dose after 4 weeks with Dr Sellers outpatient.  -B1 levels normal , can d/c thiamine for now   -neurology will sign off, call back with further questions

## 2019-04-11 NOTE — PROGRESS NOTE ADULT - SUBJECTIVE AND OBJECTIVE BOX
GYN PROGRESS NOTE PGY4    Patient evaluated at the bedside.   No issues overnight.  She's on 2L NC for desaturating to high 80s overnight.   No complaints.  She tolerated po megace and metformin well.    T(C): 35.9 (04-11-19 @ 05:20), Max: 37.2 (04-10-19 @ 13:06)  HR: 89 (04-11-19 @ 05:20) (76 - 95)  BP: 116/81 (04-11-19 @ 05:20) (116/81 - 151/89)  RR: 17 (04-11-19 @ 05:20) (16 - 18)  SpO2: 100% (04-11-19 @ 05:20) (92% - 100%)    Gen: No Acute Distress  Pulm: clear to auscultation bilaterally; decreased breath sounds bilaterally w/ crackles  GI: soft, appropriately tender, nondistended, +BS, no rebound, no guarding. Colostomy with brown output and air  Ext: SCDs in place, [facility]          04-10 @ 07:01  -  04-11 @ 07:00  --------------------------------------------------------  IN: 1150 mL / OUT: 2650 mL / NET: -1500 mL

## 2019-04-11 NOTE — PROGRESS NOTE ADULT - SUBJECTIVE AND OBJECTIVE BOX
Neurology follow up  60yo F with stage IV endometrial adenocarcinoma s/p staging surgery '18 and 1 round of chemotherapy 2/19, previously admitted for management of symptomatic rectovaginal and enterovaginal fistulas 2/22-3/25, now presenting from [facility] with fever.   Neurology consulted in setting of worsening LE weakness .      MEDICATIONS  (STANDING):  chlorhexidine 2% Cloths 1 Application(s) Topical <User Schedule>  enoxaparin Injectable 40 milliGRAM(s) SubCutaneous every 24 hours  ertapenem  IVPB 1000 milliGRAM(s) IV Intermittent every 24 hours  insulin lispro (HumaLOG) corrective regimen sliding scale   SubCutaneous Before meals and at bedtime  magnesium sulfate  IVPB 2 Gram(s) IV Intermittent once  megestrol 80 milliGRAM(s) Oral two times a day  metFORMIN 1000 milliGRAM(s) Oral every 12 hours  nystatin/triamcinolone Cream 1 Application(s) Topical every 12 hours  thiamine IVPB 100 milliGRAM(s) IV Intermittent every 24 hours    MEDICATIONS  (PRN):      Allergies    No Known Allergies    Intolerances        Vital Signs Last 24 Hrs  T(C): 35.9 (11 Apr 2019 05:20), Max: 37.2 (10 Apr 2019 13:06)  T(F): 96.7 (11 Apr 2019 05:20), Max: 99 (10 Apr 2019 13:06)  HR: 89 (11 Apr 2019 05:20) (76 - 95)  BP: 116/81 (11 Apr 2019 05:20) (116/81 - 151/89)  BP(mean): --  RR: 17 (11 Apr 2019 05:20) (16 - 18)  SpO2: 100% (11 Apr 2019 05:20) (92% - 100%)    PHYSICAL EXAM:  Mental status: Awake, alert and oriented x3  Cranial nerves: Pupils equally round and reactive to light, ,no nystagmus, extraocular muscles intact, V1 through V3 intact bilaterally and symmetric, face symmetric, palate elevation symmetric, tongue was midline, sternocleidomastoid/shoulder shrug strength bilaterally 4/5.    Motor:  Normal bulk and tone and strength 4/5 in bilateral upper extremities. Hip flexion 3/5 .  Patients knee flexion and extension 4/5. Some dorsiflexion noted of left foot however unable to move right foot.   Sensation: Intact to light touch   reflexes:  downgoing toes bilaterally      Fingerstick Blood Glucose: CAPILLARY BLOOD GLUCOSE      POCT Blood Glucose.: 134 mg/dL (11 Apr 2019 07:57)       LABS:                        8.0    5.91  )-----------( 254      ( 11 Apr 2019 05:53 )             27.1     04-11    141  |  98  |  14  ----------------------------<  133<H>  4.4   |  34<H>  |  0.49<L>    Ca    9.7      11 Apr 2019 05:53  Phos  5.1     04-11  Mg     1.8     04-11    TPro  9.0<H>  /  Alb  3.2<L>  /  TBili  0.3  /  DBili  x   /  AST  32  /  ALT  81<H>  /  AlkPhos  245<H>  04-11    PT/INR - ( 11 Apr 2019 05:53 )   PT: 11.8 sec;   INR: 1.04          PTT - ( 11 Apr 2019 05:53 )  PTT:32.8 sec Neurology follow up  60yo F with stage IV endometrial adenocarcinoma s/p staging surgery '18 and 1 round of chemotherapy 2/19, previously admitted for management of symptomatic rectovaginal and enterovaginal fistulas 2/22-3/25, now presenting from [facility] with fever.   Neurology consulted in setting of worsening LE weakness .      MEDICATIONS  (STANDING):  chlorhexidine 2% Cloths 1 Application(s) Topical <User Schedule>  enoxaparin Injectable 40 milliGRAM(s) SubCutaneous every 24 hours  ertapenem  IVPB 1000 milliGRAM(s) IV Intermittent every 24 hours  insulin lispro (HumaLOG) corrective regimen sliding scale   SubCutaneous Before meals and at bedtime  magnesium sulfate  IVPB 2 Gram(s) IV Intermittent once  megestrol 80 milliGRAM(s) Oral two times a day  metFORMIN 1000 milliGRAM(s) Oral every 12 hours  nystatin/triamcinolone Cream 1 Application(s) Topical every 12 hours  thiamine IVPB 100 milliGRAM(s) IV Intermittent every 24 hours    MEDICATIONS  (PRN):      Allergies    No Known Allergies    Intolerances        Vital Signs Last 24 Hrs  T(C): 35.9 (11 Apr 2019 05:20), Max: 37.2 (10 Apr 2019 13:06)  T(F): 96.7 (11 Apr 2019 05:20), Max: 99 (10 Apr 2019 13:06)  HR: 89 (11 Apr 2019 05:20) (76 - 95)  BP: 116/81 (11 Apr 2019 05:20) (116/81 - 151/89)  BP(mean): --  RR: 17 (11 Apr 2019 05:20) (16 - 18)  SpO2: 100% (11 Apr 2019 05:20) (92% - 100%)    PHYSICAL EXAM:  Mental status: Awake, alert and oriented x3  Cranial nerves: Pupils equally round and reactive to light, ,no nystagmus, extraocular muscles intact, V1 through V3 intact bilaterally and symmetric, face symmetric, palate elevation symmetric, tongue was midline, sternocleidomastoid/shoulder shrug strength bilaterally 4/5.    Motor:  Normal bulk and tone and strength 4/5 in bilateral upper extremities. Hip flexion 3/5 .  Patients knee flexion and extension 4/5. Some dorsiflexion noted of left foot however unable to move right foot.   Sensation: Intact to light touch   reflexes:  downgoing toes bilaterally, minimal reflexes noted on UE, none elicited in LE       Fingerstick Blood Glucose: CAPILLARY BLOOD GLUCOSE      POCT Blood Glucose.: 134 mg/dL (11 Apr 2019 07:57)       LABS:                        8.0    5.91  )-----------( 254      ( 11 Apr 2019 05:53 )             27.1     04-11    141  |  98  |  14  ----------------------------<  133<H>  4.4   |  34<H>  |  0.49<L>    Ca    9.7      11 Apr 2019 05:53  Phos  5.1     04-11  Mg     1.8     04-11    TPro  9.0<H>  /  Alb  3.2<L>  /  TBili  0.3  /  DBili  x   /  AST  32  /  ALT  81<H>  /  AlkPhos  245<H>  04-11    PT/INR - ( 11 Apr 2019 05:53 )   PT: 11.8 sec;   INR: 1.04          PTT - ( 11 Apr 2019 05:53 )  PTT:32.8 sec

## 2019-04-11 NOTE — PROGRESS NOTE ADULT - ASSESSMENT
60yo F HD11 with stage IV endometrial adenocarcinoma s/p staging surgery '18 and 1 round of chemotherapy 2/19 readmitted from [facility] with fever previously admitted for management of symptomatic rectovaginal and enterovaginal fistulas 2/22-3/25 now bacteremic. Last fever at 0855am 4/1.       1. 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 d/c thiamine.  2. Pulm - cough and SOB improved,  saturating 92-95% on RA overnight; noted to have nodule in lung on CT, will f/u outpatient  Pulmonology Consulted- finding could be pneumonia vs atelectasis, currently on appropriate antibiotic treatment and improving and no pulm intervention needed at this time; Lower extremity dopplers negative  3. Cardio- Echo: within normal limits   4. FEN/GI - Reg diet. s/p TPN. PICC line removed - PICC tip cx grew MRSA; s/p vancomycin   - elevated liver enzymes improved now increasing- GI consulted appreciate recs, RUQ sono- gallbladder wall thickening and sludge- no gallstones and hepatic vein patent   - No current active leaking of stool  - replete electrolytes as needed   - monitor strict I/O and nutritional intake w/ calorie counts   - nutrition consulted will follow calorie counts in order to make suggestions, at this time recommend 1400calories per day, 66g protein, Glucerna shakes TID  pt not meeting requirements, encouraged to have shakes and increase calories, had diarrhea, may be in response to glucerna, will monitor  5.  - Joseph placed given urinary retention 3/16  - since pt is immobile and likely still has retention, keep joseph in  - Perianal irritation consistent with possible fungal infection- continue nystatin cream 3/23; apply to affected area as needed, improving w/ less irritation   - CT abdomen/pelvis w/ IV and oral contrast performed 3/15 showed improved enterovaginal fistula, no contrast in vagina, repeat CT on 4/5 showed no contrast in vagina again   6. ID: Bacteremia- Blood culture 4/1 positive for gram neg rods, coag neg staph, proteus. Blood culture from PICC 4/3 positive for Proteus ESBL  PICC tip cx significant for MRSA - s/p vancomycin 4/7-4/9  Blood cultures NGTD  ID following- continue IV 1g qd Ertapenem 4/4 until 4/16     Previously s/p Vancomycin; s/p Zosyn (4/1-4/3), and Meropenem (4/3-4/4)  7. Endocrine- FS, ISS, metformin for improved mortality   8. VTE prophylaxis - SCDs, ambulate as tolerated, Lovenox 40 daily, PT following    9. GYN malignancy  -will stop anastrazole and proceed with 3 weeks of megace 80mg BID followed by 3 weeks of tamoxifen. Will not give faslodex. started megace and metformin 4/10.  10. Derm: monitor sacrum for s/s of pressure ulcer. encourage frequent changes in position and OOB during  the day.     *sw - consider discharge planning for next week. 1) lovenox 2) which [facility] to go to for her specific needs

## 2019-04-11 NOTE — PROGRESS NOTE ADULT - SUBJECTIVE AND OBJECTIVE BOX
GYN PROGRESS NOTE PGY4    Patient evaluated at the bedside. No acute events.  she is eating, trying to eat more calories, thinks the glucerna gave her diarrhea (1 episode)  for dispo planning, need to work on Lovenox for home and where the patient will be placed (which [facility])  heme following for macrocytic anemia, currently no further recs, monitor cbc    T(C): 37.9 (04-11-19 @ 16:52), Max: 37.9 (04-11-19 @ 16:52)  HR: 107 (04-11-19 @ 16:52) (88 - 107)  BP: 118/80 (04-11-19 @ 16:52) (116/81 - 144/89)  RR: 16 (04-11-19 @ 16:52) (16 - 17)  SpO2: 95% (04-11-19 @ 16:52) (93% - 100%)    Gen: No Acute Distress  Pulm: clear to auscultation bilaterally; decreased breath sounds bilaterally w/ crackles  GI: soft, appropriately tender, mildly distended,  no rebound, no guarding. Colostomy with brown output and air  Ext: SCDs in place, wnl, no calf pain, still extreme weakness especially in feet, can move them slightly        04-10 @ 07:01  -  04-11 @ 07:00  --------------------------------------------------------  IN: 1150 mL / OUT: 2650 mL / NET: -1500 mL    04-11 @ 07:01  -  04-11 @ 18:22  --------------------------------------------------------  IN: 570 mL / OUT: 1450 mL / NET: -880 mL

## 2019-04-11 NOTE — PROGRESS NOTE ADULT - ASSESSMENT
58 yo F stage IV endometrial adenocarcinoma s/p staging surgery 7/18 and 1 round of chemotherapy 2/19 course c/b with rectovaginal fistula presenting from [facility] with fever up to 100.5. Pt has been on TPN and recently hospitalized, now with bacteremia and persistent anemia.    #Macrocytic Anemia   Multiple etiologies of anemia, likely multifactorial and contributing to lethargy and clinical appearance, causes likely include nutritional, infectious/inflammatory. Chronic hx of anemia, previous baselines around hg 8.     -Hx of TPN, albumin <3, macrocytosis however B12 and folate levels wnl  -recent increase in Hg to 8   -repeat Iron panel c/w anemia of chronic inflammation, along with elevations in CRP and sed rate likely related to previous blood infection, corrected retic, mildly elevated monitor for bleeding and hemolysis  -neg bili, plts stable, additional hemolysis labs remain negative at this time, will montior closely while receiving IVIG admin and A blood type. Now has completed IVIG, if Hg begins to downtrend have high suspicion for hemolysis 2/2 IVIG.  -per outpt gyn/onc patient received 1 cycle of Paclitaxel/Carboplatin in 2/2019, anticipated katheryn up to 3 weeks however dose dependent and more significant in elderly, however previous exposure to chemo in 2009 (need to obtain collateral, if anthracycline exposure (topoisomerase inhib) could consider treatment related dysplasia, although would affect more than one cell line  -viral studies   -if above work up neg, will consider primary marrow eval  -transfuse for Hg <7 (transfuse as tolerated per pulm funct)   -dvt ppx - lovenox (monitor for bleeding)       Discussed w/ Dr. García

## 2019-04-11 NOTE — PROGRESS NOTE ADULT - ATTENDING COMMENTS
Strength improved compared to 1 week ago prior to IVIG  Completed initial course, will arrange for maintenance course starting in 4 weeks  f/u with me 4-5 weeks after d/c  call back with further questions EMG consistent with CIDP   Strength improved with initial course of IVIG  Completed initial course, will arrange for maintenance course starting in 4 weeks  f/u with me 4-5 weeks after d/c  call back with further questions

## 2019-04-12 LAB
CULTURE RESULTS: SIGNIFICANT CHANGE UP
GLUCOSE BLDC GLUCOMTR-MCNC: 117 MG/DL — HIGH (ref 70–99)
GLUCOSE BLDC GLUCOMTR-MCNC: 163 MG/DL — HIGH (ref 70–99)
GLUCOSE BLDC GLUCOMTR-MCNC: 171 MG/DL — HIGH (ref 70–99)
GLUCOSE BLDC GLUCOMTR-MCNC: 180 MG/DL — HIGH (ref 70–99)
SPECIMEN SOURCE: SIGNIFICANT CHANGE UP

## 2019-04-12 PROCEDURE — 99231 SBSQ HOSP IP/OBS SF/LOW 25: CPT

## 2019-04-12 RX ADMIN — ENOXAPARIN SODIUM 40 MILLIGRAM(S): 100 INJECTION SUBCUTANEOUS at 12:36

## 2019-04-12 RX ADMIN — METFORMIN HYDROCHLORIDE 1000 MILLIGRAM(S): 850 TABLET ORAL at 17:56

## 2019-04-12 RX ADMIN — CHLORHEXIDINE GLUCONATE 1 APPLICATION(S): 213 SOLUTION TOPICAL at 05:25

## 2019-04-12 RX ADMIN — MEGESTROL ACETATE 80 MILLIGRAM(S): 40 SUSPENSION ORAL at 18:49

## 2019-04-12 RX ADMIN — Medication 1: at 12:35

## 2019-04-12 RX ADMIN — Medication 1 APPLICATION(S): at 17:57

## 2019-04-12 RX ADMIN — Medication 1 APPLICATION(S): at 05:25

## 2019-04-12 RX ADMIN — Medication 1: at 17:55

## 2019-04-12 RX ADMIN — ERTAPENEM SODIUM 120 MILLIGRAM(S): 1 INJECTION, POWDER, LYOPHILIZED, FOR SOLUTION INTRAMUSCULAR; INTRAVENOUS at 17:56

## 2019-04-12 RX ADMIN — METFORMIN HYDROCHLORIDE 1000 MILLIGRAM(S): 850 TABLET ORAL at 05:25

## 2019-04-12 RX ADMIN — Medication 1: at 22:41

## 2019-04-12 RX ADMIN — MEGESTROL ACETATE 80 MILLIGRAM(S): 40 SUSPENSION ORAL at 05:25

## 2019-04-12 NOTE — PROGRESS NOTE ADULT - SUBJECTIVE AND OBJECTIVE BOX
GYN PROGRESS NOTE PGY4    Patient evaluated at the bedside. No acute events.  overnight, she had some itching, benadryl ordered.  her sacral lesion was examined, red but blanching.  getting washed at bedside this AM.    T(C): 37.1 (04-12-19 @ 05:34), Max: 37.9 (04-11-19 @ 16:52)  HR: 83 (04-12-19 @ 05:34) (83 - 110)  BP: 119/77 (04-12-19 @ 05:34) (118/75 - 132/86)  RR: 17 (04-12-19 @ 05:34) (16 - 17)  SpO2: 96% (04-12-19 @ 05:34) (93% - 96%)      04-11 @ 07:01  -  04-12 @ 07:00  --------------------------------------------------------  IN: 970 mL / OUT: 2475 mL / NET: -1505 mL    MEDICATIONS  (STANDING):  chlorhexidine 2% Cloths 1 Application(s) Topical <User Schedule>  enoxaparin Injectable 40 milliGRAM(s) SubCutaneous every 24 hours  ertapenem  IVPB 1000 milliGRAM(s) IV Intermittent every 24 hours  insulin lispro (HumaLOG) corrective regimen sliding scale   SubCutaneous Before meals and at bedtime  megestrol 80 milliGRAM(s) Oral two times a day  metFORMIN 1000 milliGRAM(s) Oral every 12 hours  nystatin/triamcinolone Cream 1 Application(s) Topical every 12 hours

## 2019-04-12 NOTE — PROGRESS NOTE ADULT - ASSESSMENT
60yo F HD12 with stage IV endometrial adenocarcinoma s/p staging surgery '18 and 1 round of chemotherapy 2/19 readmitted from [facility] with fever previously admitted for management of symptomatic rectovaginal and enterovaginal fistulas 2/22-3/25 now bacteremic. Last fever at 0855am 4/1.       1. 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 d/c thiamine.  2. Pulm - cough and SOB improved,  saturates 92-95% on RA overnight; noted to have nodule in lung on CT, will f/u outpatient  Pulmonology Consulted- finding could be pneumonia vs atelectasis, currently on appropriate antibiotic treatment and improving and no pulm intervention needed at this time; Lower extremity dopplers negative  3. Cardio- Echo: within normal limits   4. FEN/GI - Reg diet. s/p TPN. PICC line removed - PICC tip cx grew MRSA; s/p vancomycin   - elevated liver enzymes improved -GI consulted appreciate no further recs  - No current active leaking of stool  - replete electrolytes as needed   - monitor strict I/O and nutritional intake w/ calorie counts   - nutrition consulted will follow calorie counts in order to make suggestions, at this time recommend 1400calories per day, 66g protein, Glucerna shakes TID  pt not meeting requirements, encouraged to have shakes and increase calories, had diarrhea, may be in response to glucerna, will monitor  5.  - Joseph placed given urinary retention 3/16  - since pt is immobile and likely still has retention, keep joseph in  - Perianal irritation consistent with possible fungal infection- continue nystatin cream 3/23; apply to affected area as needed, improving w/ less irritation   - CT abdomen/pelvis w/ IV and oral contrast performed 3/15 showed improved enterovaginal fistula, no contrast in vagina, repeat CT on 4/5 showed no contrast in vagina again   6. ID: Bacteremia- Blood culture 4/1 positive for gram neg rods, coag neg staph, proteus. Blood culture from PICC 4/3 positive for Proteus ESBL  PICC tip cx significant for MRSA - s/p vancomycin 4/7-4/9  Blood cultures NGTD  ID following- continue IV 1g qd Ertapenem 4/4 until 4/16     Previously s/p Vancomycin; s/p Zosyn (4/1-4/3), and Meropenem (4/3-4/4)  7. Endocrine- FS, ISS, metformin for improved mortality   8. VTE prophylaxis - SCDs, ambulate as tolerated, Lovenox 40 daily, PT following    9. GYN malignancy  -will stop anastrazole and proceed with 3 weeks of megace 80mg BID followed by 3 weeks of tamoxifen. started megace and metformin 4/10.  10. Derm: monitor sacrum for s/s of pressure ulcer. encourage frequent changes in position and OOB during  the day.     *sw - consider discharge planning for next week. 1) lovenox 2) which [facility] to go to for her specific needs

## 2019-04-12 NOTE — PROGRESS NOTE ADULT - SUBJECTIVE AND OBJECTIVE BOX
GYN PROGRESS NOTE PGY3    Patient evaluated at the bedside. No acute events today, Pt does not have pain. In chair eating lunch with glucerna    Vital Signs Last 24 Hrs  T(C): 36.6 (12 Apr 2019 09:08), Max: 37.9 (11 Apr 2019 16:52)  T(F): 97.8 (12 Apr 2019 09:08), Max: 100.2 (11 Apr 2019 16:52)  HR: 101 (12 Apr 2019 09:08) (83 - 110)  BP: 117/71 (12 Apr 2019 09:08) (117/71 - 123/85)  BP(mean): --  RR: 16 (12 Apr 2019 09:08) (16 - 17)  SpO2: 95% (12 Apr 2019 09:08) (94% - 96%)      I&O's Detail    12 Apr 2019 07:01  -  12 Apr 2019 12:33  --------------------------------------------------------  IN:    Oral Fluid: 300 mL  Total IN: 300 mL    OUT:    Indwelling Catheter - Urethral: 450 mL  Total OUT: 450 mL    Total NET: -150 mL        MEDICATIONS  (STANDING):  chlorhexidine 2% Cloths 1 Application(s) Topical <User Schedule>  enoxaparin Injectable 40 milliGRAM(s) SubCutaneous every 24 hours  ertapenem  IVPB 1000 milliGRAM(s) IV Intermittent every 24 hours  insulin lispro (HumaLOG) corrective regimen sliding scale   SubCutaneous Before meals and at bedtime  megestrol 80 milliGRAM(s) Oral two times a day  metFORMIN 1000 milliGRAM(s) Oral every 12 hours  nystatin/triamcinolone Cream 1 Application(s) Topical every 12 hours    MEDICATIONS  (PRN):

## 2019-04-12 NOTE — PROGRESS NOTE ADULT - ASSESSMENT
58yo F HD12 with stage IV endometrial adenocarcinoma s/p staging surgery '18 and 1 round of chemotherapy 2/19 readmitted from [facility] with fever previously admitted for management of symptomatic rectovaginal and enterovaginal fistulas 2/22-3/25 now bacteremic. Last fever at 0855am 4/1.       1. 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 d/c thiamine.  2. Pulm - cough and SOB improved,  saturates 94-95% on RA overnight; noted to have nodule in lung on CT, will f/u outpatient  Pulmonology Consulted- finding could be pneumonia vs atelectasis, currently on appropriate antibiotic treatment and improving and no pulm intervention needed at this time; Lower extremity dopplers negative  3. Cardio- Echo: within normal limits   4. FEN/GI - Reg diet. s/p TPN. PICC line removed - PICC tip cx grew MRSA; s/p vancomycin   - elevated liver enzymes improved -GI consulted appreciate no further recs  - No current active leaking of stool  - replete electrolytes as needed   - monitor strict I/O and nutritional intake w/ calorie counts   - nutrition consulted will follow calorie counts in order to make suggestions, at this time recommend 1400calories per day, 66g protein, Glucerna shakes TID  pt not meeting requirements, encouraged to have shakes and increase calories, had diarrhea, may be in response to glucerna, will monitor  5.  - Joseph placed given urinary retention 3/16  - since pt is immobile and likely still has retention, keep joseph in  - Perianal irritation consistent with possible fungal infection- continue nystatin cream 3/23; apply to affected area as needed, improving w/ less irritation   - CT abdomen/pelvis w/ IV and oral contrast performed 3/15 showed improved enterovaginal fistula, no contrast in vagina, repeat CT on 4/5 showed no contrast in vagina again   6. ID: Bacteremia- Blood culture 4/1 positive for gram neg rods, coag neg staph, proteus. Blood culture from PICC 4/3 positive for Proteus ESBL  PICC tip cx significant for MRSA - s/p vancomycin 4/7-4/9  Blood cultures NGTD  ID following- continue IV 1g qd Ertapenem 4/4 until 4/16     Previously s/p Vancomycin; s/p Zosyn (4/1-4/3), and Meropenem (4/3-4/4)  7. Endocrine- FS, ISS, metformin for improved mortality   8. VTE prophylaxis - SCDs, ambulate as tolerated, Lovenox 40 daily, PT following    9. GYN malignancy  -will stop anastrazole and proceed with 3 weeks of megace 80mg BID followed by 3 weeks of tamoxifen. started megace and metformin 4/10.  10. Derm: monitor sacrum for s/s of pressure ulcer. encourage frequent changes in position and OOB during  the day.     *sw - consider discharge planning for next week. 1) lovenox 2) which [facility] to go to for her specific needs

## 2019-04-13 LAB
CULTURE RESULTS: SIGNIFICANT CHANGE UP
GLUCOSE BLDC GLUCOMTR-MCNC: 123 MG/DL — HIGH (ref 70–99)
GLUCOSE BLDC GLUCOMTR-MCNC: 137 MG/DL — HIGH (ref 70–99)
GLUCOSE BLDC GLUCOMTR-MCNC: 153 MG/DL — HIGH (ref 70–99)
GLUCOSE BLDC GLUCOMTR-MCNC: 173 MG/DL — HIGH (ref 70–99)
MAG AB SER-ACNC: NEGATIVE — SIGNIFICANT CHANGE UP
PYRIDOXAL PHOS SERPL-MCNC: 8.8 UG/L — SIGNIFICANT CHANGE UP (ref 2–32.8)
SPECIMEN SOURCE: SIGNIFICANT CHANGE UP

## 2019-04-13 PROCEDURE — 99231 SBSQ HOSP IP/OBS SF/LOW 25: CPT

## 2019-04-13 RX ORDER — FAMOTIDINE 10 MG/ML
20 INJECTION INTRAVENOUS DAILY
Qty: 0 | Refills: 0 | Status: DISCONTINUED | OUTPATIENT
Start: 2019-04-13 | End: 2019-04-14

## 2019-04-13 RX ADMIN — Medication 1: at 22:11

## 2019-04-13 RX ADMIN — Medication 1: at 12:47

## 2019-04-13 RX ADMIN — MEGESTROL ACETATE 80 MILLIGRAM(S): 40 SUSPENSION ORAL at 18:04

## 2019-04-13 RX ADMIN — ERTAPENEM SODIUM 120 MILLIGRAM(S): 1 INJECTION, POWDER, LYOPHILIZED, FOR SOLUTION INTRAMUSCULAR; INTRAVENOUS at 18:05

## 2019-04-13 RX ADMIN — MEGESTROL ACETATE 80 MILLIGRAM(S): 40 SUSPENSION ORAL at 06:39

## 2019-04-13 RX ADMIN — Medication 1 APPLICATION(S): at 06:39

## 2019-04-13 RX ADMIN — METFORMIN HYDROCHLORIDE 1000 MILLIGRAM(S): 850 TABLET ORAL at 06:38

## 2019-04-13 RX ADMIN — METFORMIN HYDROCHLORIDE 1000 MILLIGRAM(S): 850 TABLET ORAL at 18:04

## 2019-04-13 RX ADMIN — FAMOTIDINE 100 MILLIGRAM(S): 10 INJECTION INTRAVENOUS at 23:19

## 2019-04-13 RX ADMIN — Medication 1 APPLICATION(S): at 18:05

## 2019-04-13 RX ADMIN — CHLORHEXIDINE GLUCONATE 1 APPLICATION(S): 213 SOLUTION TOPICAL at 06:39

## 2019-04-13 RX ADMIN — ENOXAPARIN SODIUM 40 MILLIGRAM(S): 100 INJECTION SUBCUTANEOUS at 11:20

## 2019-04-13 NOTE — PROGRESS NOTE ADULT - ASSESSMENT
58yo F HD13 with stage IV endometrial adenocarcinoma s/p staging surgery '18 and 1 round of chemotherapy 2/19 readmitted from [facility] with fever previously admitted for management of symptomatic rectovaginal and enterovaginal fistulas 2/22-3/25 now bacteremic. Last fever at 0855am 4/1.       1. 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 d/c thiamine.  2. Pulm - cough and SOB improved,  saturates 96% on RA overnight; noted to have nodule in lung on CT, will f/u outpatient  Pulmonology Consulted- finding could be pneumonia vs atelectasis, currently on appropriate antibiotic treatment and improving and no pulm intervention needed at this time; Lower extremity dopplers negative  3. Cardio- Echo: within normal limits   4. FEN/GI - Reg diet. s/p TPN. PICC line removed - PICC tip cx grew MRSA; s/p vancomycin   - elevated liver enzymes improved  -GI consulted -no further recs  - No current active leaking of stool per vagina  - replete electrolytes as needed   - monitor strict I/O and nutritional intake w/ calorie counts   - nutrition consulted will follow calorie counts in order to make suggestions, at this time recommend 1400calories per day, 66g protein, Glucerna shakes TID  pt not meeting requirements, encouraged to have shakes and increase calories, had diarrhea which improving, no diarrhea today  -metformin started 4/10    5.  - Joseph placed given urinary retention 3/16  - since pt is immobile and likely still has retention, keep joseph in place  - Perianal irritation consistent with possible fungal infection- continue nystatin cream 3/23; apply to affected area as needed, improving w/ less irritation   - CT abdomen/pelvis w/ IV and oral contrast performed 3/15 showed improved enterovaginal fistula, no contrast in vagina, repeat CT on 4/5 showed no contrast in vagina again   6. ID: Bacteremia- Blood culture 4/1 positive for gram neg rods, coag neg staph, proteus. Blood culture from PICC 4/3 positive for Proteus ESBL  PICC tip cx significant for MRSA - s/p vancomycin 4/7-4/9  Blood cultures NGTD  ID following- continue IV 1g qd Ertapenem 4/4 until 4/16,  Previously s/p Vancomycin; s/p Zosyn (4/1-4/3), and Meropenem (4/3-4/4)  7. Endocrine- FS, ISS, metformin for improved mortality   8. VTE prophylaxis - SCDs, ambulate as tolerated, Lovenox 40 daily, PT following    9. GYN malignancy  -will stop anastrazole and proceed with 3 weeks of megace 80mg BID followed by 3 weeks of tamoxifen. started megace  4/10.  10. Derm: monitor sacrum for s/s of pressure ulcer. encourage frequent changes in position and OOB during the day; will apply moisturizing cream to dry skin on right heal and provide padding in form of pillow under heal for additional comfort  11) Social Work- consider discharge planning for next week. 1) lovenox 2) [facility] to go to for her specific needs, specify needs with PT, patients first choice [facility]

## 2019-04-13 NOTE — PROGRESS NOTE ADULT - SUBJECTIVE AND OBJECTIVE BOX
Patient evaluated at the bedside. No acute events overnight.  She denies chest pain or SOB, abdominal pain, nausea or vomiting.   Tolerating regular diet.  Reports having some pain in her right heel.      T(F): 98.1 (04-13-19 @ 05:29), Max: 99.1 (04-12-19 @ 20:22)  HR: 89 (04-13-19 @ 05:29) (89 - 108)  BP: 123/82 (04-13-19 @ 05:29) (117/71 - 124/84)  RR: 17 (04-13-19 @ 05:29) (16 - 17)  SpO2: 96% (04-13-19 @ 05:29) (94% - 96%)  Wt(kg): --  I&O's Summary    12 Apr 2019 07:01  -  13 Apr 2019 07:00  --------------------------------------------------------  IN: 820 mL / OUT: 2251 mL / NET: -1431 mL        MEDICATIONS  (STANDING):  chlorhexidine 2% Cloths 1 Application(s) Topical <User Schedule>  enoxaparin Injectable 40 milliGRAM(s) SubCutaneous every 24 hours  ertapenem  IVPB 1000 milliGRAM(s) IV Intermittent every 24 hours  insulin lispro (HumaLOG) corrective regimen sliding scale   SubCutaneous Before meals and at bedtime  megestrol 80 milliGRAM(s) Oral two times a day  metFORMIN 1000 milliGRAM(s) Oral every 12 hours  nystatin/triamcinolone Cream 1 Application(s) Topical every 12 hours    MEDICATIONS  (PRN):      Physical Exam:  Constitutional: NAD  Pulmonary: clear to auscultation bilaterally, regular work of breathing  Cardiovascular: Regular rate and rhythm   GI: soft, appropriately tender, mildly distended,  no rebound, no guarding. Colostomy with brown output and air  Ext: wnl, no calf pain, still extreme weakness especially in feet, can move them slightly; right heel with some dry red skin, no skin breakdown.        LABS:

## 2019-04-13 NOTE — PROGRESS NOTE ADULT - ASSESSMENT
60yo F HD13 with stage IV endometrial adenocarcinoma s/p staging surgery '18 and 1 round of chemotherapy 2/19 readmitted from [facility] with fever previously admitted for management of symptomatic rectovaginal and enterovaginal fistulas 2/22-3/25 now bacteremic. Last fever at 0855am 4/1.       1. 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 d/c thiamine.  2. Pulm - cough and SOB improved,  saturates 96% on RA overnight; noted to have nodule in lung on CT, will f/u outpatient  Pulmonology Consulted- finding could be pneumonia vs atelectasis, currently on appropriate antibiotic treatment and improving and no pulm intervention needed at this time; Lower extremity dopplers negative  3. Cardio- Echo: within normal limits   4. FEN/GI - Reg diet. s/p TPN. PICC line removed - PICC tip cx grew MRSA; s/p vancomycin   - elevated liver enzymes improved -GI consulted appreciate no further recs  - No current active leaking of stool  - replete electrolytes as needed   - monitor strict I/O and nutritional intake w/ calorie counts   - nutrition consulted will follow calorie counts in order to make suggestions, at this time recommend 1400calories per day, 66g protein, Glucerna shakes TID  pt not meeting requirements, encouraged to have shakes and increase calories, had diarrhea, may be in response to glucerna, will monitor  5.  - Joseph placed given urinary retention 3/16  - since pt is immobile and likely still has retention, keep joseph in  - Perianal irritation consistent with possible fungal infection- continue nystatin cream 3/23; apply to affected area as needed, improving w/ less irritation   - CT abdomen/pelvis w/ IV and oral contrast performed 3/15 showed improved enterovaginal fistula, no contrast in vagina, repeat CT on 4/5 showed no contrast in vagina again   6. ID: Bacteremia- Blood culture 4/1 positive for gram neg rods, coag neg staph, proteus. Blood culture from PICC 4/3 positive for Proteus ESBL  PICC tip cx significant for MRSA - s/p vancomycin 4/7-4/9  Blood cultures NGTD  ID following- continue IV 1g qd Ertapenem 4/4 until 4/16     Previously s/p Vancomycin; s/p Zosyn (4/1-4/3), and Meropenem (4/3-4/4)  7. Endocrine- FS, ISS, metformin for improved mortality   8. VTE prophylaxis - SCDs, ambulate as tolerated, Lovenox 40 daily, PT following    9. GYN malignancy  -will stop anastrazole and proceed with 3 weeks of megace 80mg BID followed by 3 weeks of tamoxifen. started megace and metformin 4/10.  10. Derm: monitor sacrum for s/s of pressure ulcer. encourage frequent changes in position and OOB during  the day; will apply moisturizing cream to dry skin on right heal and provide padding in form of pillow under heal for additional comfort    *sw - consider discharge planning for next week. 1) lovenox 2) which [facility] to go to for her specific needs

## 2019-04-13 NOTE — PROGRESS NOTE ADULT - SUBJECTIVE AND OBJECTIVE BOX
GYN Progress Note    Patient seen at bedside this afternoon. States is feeling well, denies any pain at this time. Tolerating regular diet. Denies any loss stools per rectum today.   Tolerating regular diet , passing flatus and stool ostomy.  Has not tried ambulating today. States that she still feels weak in her legs, but no difficulty with eating today or moving her hands/arms.   Denies CP, palpitations, SOB, fever, chills, nausea, vomiting.    Vital Signs Last 24 Hrs  T(C): 37.3 (13 Apr 2019 14:17), Max: 37.3 (12 Apr 2019 20:22)  T(F): 99.1 (13 Apr 2019 14:17), Max: 99.1 (12 Apr 2019 20:22)  HR: 114 (13 Apr 2019 14:17) (68 - 114)  BP: 125/83 (13 Apr 2019 14:17) (93/60 - 125/83)  BP(mean): --  RR: 17 (13 Apr 2019 14:17) (16 - 17)  SpO2: 94% (13 Apr 2019 14:17) (94% - 96%)    Physical Exam:  Constitutional: NAD  Pulmonary: clear to auscultation bilaterally, regular work of breathing  Cardiovascular: Regular rate and rhythm   GI: soft, non tender, mildly distended,  no rebound, no guarding, colostomy with brown output and gas  Ext: wnl, no calf pain, bilateral weakness legs and in feet     I&O's Summary    12 Apr 2019 07:01  -  13 Apr 2019 07:00  --------------------------------------------------------  IN: 820 mL / OUT: 2251 mL / NET: -1431 mL    13 Apr 2019 07:01  -  13 Apr 2019 15:56  --------------------------------------------------------  IN: 0 mL / OUT: 1050 mL / NET: -1050 mL      MEDICATIONS  (STANDING):  chlorhexidine 2% Cloths 1 Application(s) Topical <User Schedule>  enoxaparin Injectable 40 milliGRAM(s) SubCutaneous every 24 hours  ertapenem  IVPB 1000 milliGRAM(s) IV Intermittent every 24 hours  insulin lispro (HumaLOG) corrective regimen sliding scale   SubCutaneous Before meals and at bedtime  megestrol 80 milliGRAM(s) Oral two times a day  metFORMIN 1000 milliGRAM(s) Oral every 12 hours  nystatin/triamcinolone Cream 1 Application(s) Topical every 12 hours    MEDICATIONS  (PRN):      LABS:

## 2019-04-14 LAB
ALBUMIN SERPL ELPH-MCNC: 3.1 G/DL — LOW (ref 3.3–5)
ALP SERPL-CCNC: 205 U/L — HIGH (ref 40–120)
ALT FLD-CCNC: 42 U/L — SIGNIFICANT CHANGE UP (ref 10–45)
ANION GAP SERPL CALC-SCNC: 13 MMOL/L — SIGNIFICANT CHANGE UP (ref 5–17)
AST SERPL-CCNC: 24 U/L — SIGNIFICANT CHANGE UP (ref 10–40)
BASOPHILS # BLD AUTO: 0.03 K/UL — SIGNIFICANT CHANGE UP (ref 0–0.2)
BASOPHILS NFR BLD AUTO: 0.5 % — SIGNIFICANT CHANGE UP (ref 0–2)
BILIRUB SERPL-MCNC: 0.3 MG/DL — SIGNIFICANT CHANGE UP (ref 0.2–1.2)
BUN SERPL-MCNC: 19 MG/DL — SIGNIFICANT CHANGE UP (ref 7–23)
CALCIUM SERPL-MCNC: 10.6 MG/DL — HIGH (ref 8.4–10.5)
CHLORIDE SERPL-SCNC: 99 MMOL/L — SIGNIFICANT CHANGE UP (ref 96–108)
CO2 SERPL-SCNC: 25 MMOL/L — SIGNIFICANT CHANGE UP (ref 22–31)
CREAT SERPL-MCNC: 0.46 MG/DL — LOW (ref 0.5–1.3)
EOSINOPHIL # BLD AUTO: 0.1 K/UL — SIGNIFICANT CHANGE UP (ref 0–0.5)
EOSINOPHIL NFR BLD AUTO: 1.6 % — SIGNIFICANT CHANGE UP (ref 0–6)
GLUCOSE BLDC GLUCOMTR-MCNC: 111 MG/DL — HIGH (ref 70–99)
GLUCOSE BLDC GLUCOMTR-MCNC: 156 MG/DL — HIGH (ref 70–99)
GLUCOSE BLDC GLUCOMTR-MCNC: 158 MG/DL — HIGH (ref 70–99)
GLUCOSE BLDC GLUCOMTR-MCNC: 281 MG/DL — HIGH (ref 70–99)
GLUCOSE SERPL-MCNC: 124 MG/DL — HIGH (ref 70–99)
HCT VFR BLD CALC: 31.3 % — LOW (ref 34.5–45)
HGB BLD-MCNC: 9.4 G/DL — LOW (ref 11.5–15.5)
IMM GRANULOCYTES NFR BLD AUTO: 0.3 % — SIGNIFICANT CHANGE UP (ref 0–1.5)
LYMPHOCYTES # BLD AUTO: 1.96 K/UL — SIGNIFICANT CHANGE UP (ref 1–3.3)
LYMPHOCYTES # BLD AUTO: 32.2 % — SIGNIFICANT CHANGE UP (ref 13–44)
MAGNESIUM SERPL-MCNC: 1.7 MG/DL — SIGNIFICANT CHANGE UP (ref 1.6–2.6)
MCHC RBC-ENTMCNC: 30 GM/DL — LOW (ref 32–36)
MCHC RBC-ENTMCNC: 30.2 PG — SIGNIFICANT CHANGE UP (ref 27–34)
MCV RBC AUTO: 100.6 FL — HIGH (ref 80–100)
MONOCYTES # BLD AUTO: 0.6 K/UL — SIGNIFICANT CHANGE UP (ref 0–0.9)
MONOCYTES NFR BLD AUTO: 9.9 % — SIGNIFICANT CHANGE UP (ref 2–14)
NEUTROPHILS # BLD AUTO: 3.38 K/UL — SIGNIFICANT CHANGE UP (ref 1.8–7.4)
NEUTROPHILS NFR BLD AUTO: 55.5 % — SIGNIFICANT CHANGE UP (ref 43–77)
NRBC # BLD: 0 /100 WBCS — SIGNIFICANT CHANGE UP (ref 0–0)
PHOSPHATE SERPL-MCNC: 4.7 MG/DL — HIGH (ref 2.5–4.5)
PLATELET # BLD AUTO: 318 K/UL — SIGNIFICANT CHANGE UP (ref 150–400)
POTASSIUM SERPL-MCNC: 4.7 MMOL/L — SIGNIFICANT CHANGE UP (ref 3.5–5.3)
POTASSIUM SERPL-SCNC: 4.7 MMOL/L — SIGNIFICANT CHANGE UP (ref 3.5–5.3)
PROT SERPL-MCNC: 9.1 G/DL — HIGH (ref 6–8.3)
RBC # BLD: 3.11 M/UL — LOW (ref 3.8–5.2)
RBC # FLD: 16.6 % — HIGH (ref 10.3–14.5)
SODIUM SERPL-SCNC: 137 MMOL/L — SIGNIFICANT CHANGE UP (ref 135–145)
WBC # BLD: 6.09 K/UL — SIGNIFICANT CHANGE UP (ref 3.8–10.5)
WBC # FLD AUTO: 6.09 K/UL — SIGNIFICANT CHANGE UP (ref 3.8–10.5)

## 2019-04-14 PROCEDURE — 99231 SBSQ HOSP IP/OBS SF/LOW 25: CPT

## 2019-04-14 RX ORDER — FAMOTIDINE 10 MG/ML
20 INJECTION INTRAVENOUS DAILY
Qty: 0 | Refills: 0 | Status: DISCONTINUED | OUTPATIENT
Start: 2019-04-14 | End: 2019-04-17

## 2019-04-14 RX ADMIN — Medication 3: at 17:27

## 2019-04-14 RX ADMIN — CHLORHEXIDINE GLUCONATE 1 APPLICATION(S): 213 SOLUTION TOPICAL at 05:28

## 2019-04-14 RX ADMIN — ENOXAPARIN SODIUM 40 MILLIGRAM(S): 100 INJECTION SUBCUTANEOUS at 12:03

## 2019-04-14 RX ADMIN — MEGESTROL ACETATE 80 MILLIGRAM(S): 40 SUSPENSION ORAL at 05:32

## 2019-04-14 RX ADMIN — ERTAPENEM SODIUM 120 MILLIGRAM(S): 1 INJECTION, POWDER, LYOPHILIZED, FOR SOLUTION INTRAMUSCULAR; INTRAVENOUS at 17:26

## 2019-04-14 RX ADMIN — Medication 1: at 21:58

## 2019-04-14 RX ADMIN — METFORMIN HYDROCHLORIDE 1000 MILLIGRAM(S): 850 TABLET ORAL at 17:26

## 2019-04-14 RX ADMIN — Medication 1: at 12:03

## 2019-04-14 RX ADMIN — Medication 1 APPLICATION(S): at 17:27

## 2019-04-14 RX ADMIN — METFORMIN HYDROCHLORIDE 1000 MILLIGRAM(S): 850 TABLET ORAL at 05:32

## 2019-04-14 RX ADMIN — MEGESTROL ACETATE 80 MILLIGRAM(S): 40 SUSPENSION ORAL at 17:26

## 2019-04-14 NOTE — PROGRESS NOTE ADULT - SUBJECTIVE AND OBJECTIVE BOX
Patient evaluated at bedside. Has no complaints.  Denies leaking of stool per vagina.  [place] remains in place. Went out of bed to chair today. Denies CP, palpitations, SOB, fever, chills, nausea, vomiting.    T(F): 97.3 (04-14-19 @ 08:55), Max: 99.6 (04-13-19 @ 21:10)  HR: 91 (04-14-19 @ 08:55) (91 - 114)  BP: 104/71 (04-14-19 @ 08:55) (104/71 - 125/83)  RR: 17 (04-14-19 @ 08:55) (17 - 18)  SpO2: 98% (04-14-19 @ 08:55) (94% - 98%)  Wt(kg): --  I&O's Summary    13 Apr 2019 07:01  -  14 Apr 2019 07:00  --------------------------------------------------------  IN: 420 mL / OUT: 2150 mL / NET: -1730 mL        MEDICATIONS  (STANDING):  chlorhexidine 2% Cloths 1 Application(s) Topical <User Schedule>  enoxaparin Injectable 40 milliGRAM(s) SubCutaneous every 24 hours  ertapenem  IVPB 1000 milliGRAM(s) IV Intermittent every 24 hours  insulin lispro (HumaLOG) corrective regimen sliding scale   SubCutaneous Before meals and at bedtime  megestrol 80 milliGRAM(s) Oral two times a day  metFORMIN 1000 milliGRAM(s) Oral every 12 hours  nystatin/triamcinolone Cream 1 Application(s) Topical every 12 hours    MEDICATIONS  (PRN):  aluminum hydroxide/magnesium hydroxide/simethicone Suspension 30 milliLiter(s) Oral every 4 hours PRN Dyspepsia  famotidine    Tablet 20 milliGRAM(s) Oral daily PRN heartburn      Constitutional: NAD, comfortable, sitting up in chair eating lunch  Pulmonary: clear to auscultation bilaterally, regular work of breathing  Cardiovascular: Regular rate and rhythm   GI: soft, non tender, non distended,  no rebound, no guarding, colostomy with brown output and gas  Ext: no calf pain, bilateral weakness legs and feet    LABS:                        9.4    6.09  )-----------( 318      ( 14 Apr 2019 08:35 )             31.3     04-14    137  |  99  |  19  ----------------------------<  124<H>  4.7   |  25  |  0.46<L>    Ca    10.6<H>      14 Apr 2019 08:36  Phos  4.7     04-14  Mg     1.7     04-14    TPro  9.1<H>  /  Alb  3.1<L>  /  TBili  0.3  /  DBili  x   /  AST  24  /  ALT  42  /  AlkPhos  205<H>  04-14

## 2019-04-14 NOTE — PROGRESS NOTE ADULT - SUBJECTIVE AND OBJECTIVE BOX
GYN Progress Note    Patient seen at bedside this morning. No issues overnight. Slept comfortable throughout night.   After dinner last night, had mild heartburn, resolved with pepcid. Denies any pain this morning. Denies leaking of stool per vagina and no BM per rectum.   [place] remains in place. Did not ambulate yesterday, plans to try to ambulate today.   Denies CP, palpitations, SOB, fever, chills, nausea, vomiting.    Vital Signs Last 24 Hrs  T(C): 36.6 (14 Apr 2019 06:00), Max: 37.6 (13 Apr 2019 21:10)  T(F): 97.8 (14 Apr 2019 06:00), Max: 99.6 (13 Apr 2019 21:10)  HR: 92 (14 Apr 2019 06:00) (68 - 114)  BP: 107/75 (14 Apr 2019 06:00) (93/60 - 125/83)  BP(mean): --  RR: 18 (14 Apr 2019 06:00) (16 - 18)  SpO2: 97% (14 Apr 2019 06:00) (94% - 97%)    Constitutional: NAD, comfortable  Pulmonary: clear to auscultation bilaterally, regular work of breathing  Cardiovascular: Regular rate and rhythm   GI: soft, non tender, non distended,  no rebound, no guarding, colostomy with brown output and gas  Ext: no calf pain, bilateral weakness legs and feet, dryness in R lateral foot, no skin breakdown    I&O's Summary    12 Apr 2019 07:01  -  13 Apr 2019 07:00  --------------------------------------------------------  IN: 820 mL / OUT: 2251 mL / NET: -1431 mL    13 Apr 2019 07:01  -  14 Apr 2019 06:32  --------------------------------------------------------  IN: 420 mL / OUT: 2150 mL / NET: -1730 mL      MEDICATIONS  (STANDING):  chlorhexidine 2% Cloths 1 Application(s) Topical <User Schedule>  enoxaparin Injectable 40 milliGRAM(s) SubCutaneous every 24 hours  ertapenem  IVPB 1000 milliGRAM(s) IV Intermittent every 24 hours  famotidine  IVPB 20 milliGRAM(s) IV Intermittent daily  insulin lispro (HumaLOG) corrective regimen sliding scale   SubCutaneous Before meals and at bedtime  megestrol 80 milliGRAM(s) Oral two times a day  metFORMIN 1000 milliGRAM(s) Oral every 12 hours  nystatin/triamcinolone Cream 1 Application(s) Topical every 12 hours    MEDICATIONS  (PRN):  aluminum hydroxide/magnesium hydroxide/simethicone Suspension 30 milliLiter(s) Oral every 4 hours PRN Dyspepsia      LABS:

## 2019-04-14 NOTE — PROGRESS NOTE ADULT - ASSESSMENT
60yo F HD14 with stage IV endometrial adenocarcinoma s/p staging surgery '18 and 1 round of chemotherapy 2/19 readmitted from [facility] with fever previously admitted for management of symptomatic rectovaginal and enterovaginal fistulas 2/22-3/25 now bacteremic. Last fever at 0855am 4/1.       1. Neuro: no complaints of pain; Neurology consulted - EMG 4/5 showed moderate to severe demyelinating polyneuropathy of upper extremities,  not diagnostic for CIDP but with high suspicion for autoimmune process and lower ext abnormality likely due to hx of polio; per neuro recs  will f/u panel of labs per recommendations, s/p 5 days IVIG. d/c thiamine. F/u with Neurology this week requirements at [facility] for rehab necessities  2. Pulm - cough and SOB improved,  saturates 96-97% on RA overnight; noted to have nodule in lung on CT, will f/u outpatient  Pulmonology Consulted- finding could be pneumonia vs atelectasis, currently on appropriate antibiotic treatment and improving and no pulm intervention needed at this time; Lower extremity dopplers negative  3. Cardio- Echo: within normal limits   4. FEN/GI - Reg diet. s/p TPN. PICC line removed - PICC tip cx grew MRSA; s/p vancomycin   - elevated liver enzymes improved  -GI consulted -no further recs  - No current active leaking of stool per vagina  - replete electrolytes as needed   - monitor strict I/O and nutritional intake w/ calorie counts   - nutrition consulted will follow calorie counts in order to make suggestions, at this time recommend 1400calories per day, 66g protein, Glucerna shakes TID  pt not meeting requirements, encouraged to have shakes and increase calories, had diarrhea  which has now resolved  - T2DM- ISS, metformin started 4/10    5.  - Joseph placed given urinary retention 3/16  - since pt is immobile and likely still has retention, keep joseph in place  - Perianal irritation consistent with possible fungal infection- continue nystatin cream 3/23; apply to affected area as needed, improving w/ less irritation   - CT abdomen/pelvis w/ IV and oral contrast performed 3/15 showed improved enterovaginal fistula, no contrast in vagina, repeat CT on 4/5 showed no contrast in vagina again   6. ID: Bacteremia- Blood culture 4/1 positive for gram neg rods, coag neg staph, proteus. Blood culture from PICC 4/3 positive for Proteus ESBL  PICC tip cx significant for MRSA - s/p vancomycin 4/7-4/9  Blood cultures 4/7 and 4/8-NGTD x5 days  ID following- continue IV 1g qd Ertapenem 4/4 until 4/16,  Previously s/p Vancomycin; s/p Zosyn (4/1-4/3), and Meropenem (4/3-4/4)  7. Endocrine- FS, ISS, metformin    8. VTE prophylaxis - SCDs, ambulate as tolerated, Lovenox 40 daily, PT following    9. GYN malignancy  -stopped anastrazole and started on 4/10  megace 80mg BID ( for 3 weeks) followed by 3 weeks of tamoxifen  10. Derm: monitor sacrum for s/s of pressure ulcer. encourage frequent changes in position and OOB during the day  11) Social Work- initiate discharge planning  1) lovenox 2) [facility]- per PT needs [facility], specify needs with PT if need any neuro specific rehabilitation given new diagnosis of chronic inflammatory demyelinating polio, patients first choice [facility]

## 2019-04-14 NOTE — PROGRESS NOTE ADULT - ASSESSMENT
58yo F HD13 with stage IV endometrial adenocarcinoma s/p staging surgery '18 and 1 round of chemotherapy 2/19 readmitted from [facility] with fever previously admitted for management of symptomatic rectovaginal and enterovaginal fistulas 2/22-3/25 now bacteremic. Last fever at 0855am 4/1. HD 14      1. Neuro: no complaints of pain; Neurology consulted - EMG 4/5 showed moderate to severe demyelinating polyneuropathy of upper extremities,  not diagnostic for CIDP but with high suspicion for autoimmune process and lower ext abnormality likely due to hx of polio; per neuro recs  will f/u panel of labs per recommendations, s/p 5 days IVIG. d/c thiamine. F/u with Neurology this week requirements at [facility] for rehab necessities  2. Pulm - cough and SOB improved,  saturates 96-97% on RA overnight; noted to have nodule in lung on CT, will f/u outpatient  Pulmonology Consulted- finding could be pneumonia vs atelectasis, currently on appropriate antibiotic treatment and improving and no pulm intervention needed at this time; Lower extremity dopplers negative  3. Cardio- Echo: within normal limits   4. FEN/GI - Reg diet. s/p TPN. PICC line removed - PICC tip cx grew MRSA; s/p vancomycin   - elevated liver enzymes improved  -GI consulted -no further recs  - No current active leaking of stool per vagina  - replete electrolytes as needed   - monitor strict I/O and nutritional intake w/ calorie counts   - nutrition consulted will follow calorie counts in order to make suggestions, at this time recommend 1400calories per day, 66g protein, Glucerna shakes TID  pt not meeting requirements, encouraged to have shakes and increase calories, had diarrhea  which has now resolved  - T2DM- ISS, metformin started 4/10    5.  - Joseph placed given urinary retention 3/16  - since pt is immobile and likely still has retention, keep joseph in place  - Perianal irritation consistent with possible fungal infection- continue nystatin cream 3/23; apply to affected area as needed, improving w/ less irritation   - CT abdomen/pelvis w/ IV and oral contrast performed 3/15 showed improved enterovaginal fistula, no contrast in vagina, repeat CT on 4/5 showed no contrast in vagina again   6. ID: Bacteremia- Blood culture 4/1 positive for gram neg rods, coag neg staph, proteus. Blood culture from PICC 4/3 positive for Proteus ESBL  PICC tip cx significant for MRSA - s/p vancomycin 4/7-4/9  Blood cultures 4/7 and 4/8-NGTD x5 days  ID following- continue IV 1g qd Ertapenem 4/4 until 4/16,  Previously s/p Vancomycin; s/p Zosyn (4/1-4/3), and Meropenem (4/3-4/4)  7. Endocrine- FS, ISS, metformin    8. VTE prophylaxis - SCDs, ambulate as tolerated, Lovenox 40 daily, PT following    9. GYN malignancy  -stopped anastrazole and started on 4/10  megace 80mg BID ( for 3 weeks) followed by 3 weeks of tamoxifen  10. Derm: monitor sacrum for s/s of pressure ulcer. encourage frequent changes in position and OOB during the day; will apply moisturizing cream to dry skin on right heal and provide padding  11) Social Work- initiate discharge planning  1) lovenox 2) [facility]- per PT needs [facility], specify needs with PT if need any neuro specific rehabilitation given new diagnosis of chronic inflammatory demyelinating polio, patients first choice [facility]

## 2019-04-15 LAB
GLUCOSE BLDC GLUCOMTR-MCNC: 131 MG/DL — HIGH (ref 70–99)
GLUCOSE BLDC GLUCOMTR-MCNC: 144 MG/DL — HIGH (ref 70–99)
GLUCOSE BLDC GLUCOMTR-MCNC: 145 MG/DL — HIGH (ref 70–99)
GLUCOSE BLDC GLUCOMTR-MCNC: 167 MG/DL — HIGH (ref 70–99)

## 2019-04-15 PROCEDURE — 99231 SBSQ HOSP IP/OBS SF/LOW 25: CPT

## 2019-04-15 RX ADMIN — METFORMIN HYDROCHLORIDE 1000 MILLIGRAM(S): 850 TABLET ORAL at 17:32

## 2019-04-15 RX ADMIN — METFORMIN HYDROCHLORIDE 1000 MILLIGRAM(S): 850 TABLET ORAL at 05:40

## 2019-04-15 RX ADMIN — Medication 1 APPLICATION(S): at 17:31

## 2019-04-15 RX ADMIN — CHLORHEXIDINE GLUCONATE 1 APPLICATION(S): 213 SOLUTION TOPICAL at 05:40

## 2019-04-15 RX ADMIN — ENOXAPARIN SODIUM 40 MILLIGRAM(S): 100 INJECTION SUBCUTANEOUS at 13:20

## 2019-04-15 RX ADMIN — MEGESTROL ACETATE 80 MILLIGRAM(S): 40 SUSPENSION ORAL at 05:40

## 2019-04-15 RX ADMIN — MEGESTROL ACETATE 80 MILLIGRAM(S): 40 SUSPENSION ORAL at 20:23

## 2019-04-15 RX ADMIN — Medication 1: at 12:00

## 2019-04-15 RX ADMIN — ERTAPENEM SODIUM 120 MILLIGRAM(S): 1 INJECTION, POWDER, LYOPHILIZED, FOR SOLUTION INTRAMUSCULAR; INTRAVENOUS at 17:37

## 2019-04-15 RX ADMIN — Medication 1 APPLICATION(S): at 05:41

## 2019-04-15 NOTE — PROGRESS NOTE ADULT - ASSESSMENT
58yo F HD15 with stage IV endometrial adenocarcinoma s/p staging surgery '18 and 1 round of chemotherapy 2/19 readmitted from [facility] with fever previously admitted for management of symptomatic rectovaginal and enterovaginal fistulas 2/22-3/25, bacteremic but now with negative cultures from 4/7. Last fever at 0855am 4/1.       1. 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 s/p thiamine. plan for outpatient IVIG administration. neuro signed off for now.  2. Pulm - cough and SOB improved,  saturates 96% on RA overnight; noted to have nodule in lung on CT, will f/u outpatient  Pulmonology Consulted- finding could be pneumonia vs atelectasis, currently on appropriate antibiotic treatment and improving and no pulm intervention needed at this time; Lower extremity dopplers negative, signed off  3. Cardio- Echo: within normal limits   4. FEN/GI - Reg diet. s/p TPN. PICC line removed - PICC tip cx grew MRSA; s/p vancomycin   - s/p elevated liver enzymes improved -GI consulted appreciate no further recs  - No current active leaking of stool  - replete electrolytes as needed   - monitor strict I/O and nutritional intake w/ calorie counts, pt improving with diet  - nutrition consulted will follow calorie counts in order to make suggestions, at this time recommend 1400calories per day, 66g protein, Glucerna shakes TID  pt not meeting requirements, encouraged to have shakes and increase calories  5.  - Joseph placed given urinary retention 3/16  - since pt is immobile and likely still has retention, keep joseph in  - Perianal irritation consistent with possible fungal infection- continue nystatin cream 3/23; apply to affected area as needed, improving w/ less irritation   - CT abdomen/pelvis w/ IV and oral contrast performed 3/15 showed improved enterovaginal fistula, no contrast in vagina, repeat CT on 4/5 showed no contrast in vagina again   6. ID: Bacteremia- Blood culture 4/1 positive for gram neg rods, coag neg staph, proteus. Blood culture from PICC 4/3 positive for Proteus ESBL  PICC tip cx significant for MRSA - s/p vancomycin 4/7-4/9  Blood cultures NGTD  ID following- continue IV 1g qd Ertapenem 4/4 until 4/16     Previously s/p Vancomycin; s/p Zosyn (4/1-4/3), and Meropenem (4/3-4/4)  7. Endocrine- FS, ISS, metformin for improved mortality   8. VTE prophylaxis - SCDs, ambulate as tolerated, Lovenox 40 daily, PT following    9. GYN malignancy  -stopped anastrazole and initiated 3 weeks of megace 80mg BID followed by 3 weeks of tamoxifen. started megace and metformin 4/10.  10. Derm: monitor sacrum for s/s of pressure ulcer. encourage frequent changes in position and OOB during  the day; will apply moisturizing cream to dry skin on right heal and provide padding in form of pillow under heal for additional comfort  11. social work/dispo planning. Discharge likely Tuesday or Wednesday. 1) lovenox scripts with teaching 2) which [facility] to go to for her specific needs (neuro, ostomy, lovenox). 3) continue megace and metformin.

## 2019-04-15 NOTE — PROGRESS NOTE ADULT - SUBJECTIVE AND OBJECTIVE BOX
GYN PROGRESS NOTE PGY4    Patient evaluated at the bedside. No acute events.  sitting in the chair, no complaints. eating.  working with SW on placement.    T(C): 37.3 (04-15-19 @ 08:15), Max: 37.6 (04-15-19 @ 05:54)  HR: 100 (04-15-19 @ 08:15) (100 - 106)  BP: 110/76 (04-15-19 @ 08:15) (108/73 - 121/83)  RR: 17 (04-15-19 @ 08:15) (17 - 18)  SpO2: 95% (04-15-19 @ 08:15) (95% - 96%)    GEN: patient appears well  LUNGS: no respiratory distress    04-14 @ 07:01  -  04-15 @ 07:00  --------------------------------------------------------  IN: 600 mL / OUT: 1400 mL / NET: -800 mL    04-15 @ 07:01  -  04-15 @ 16:21  --------------------------------------------------------  IN: 420 mL / OUT: 0 mL / NET: 420 mL

## 2019-04-15 NOTE — PROGRESS NOTE ADULT - SUBJECTIVE AND OBJECTIVE BOX
GYN PROGRESS NOTE PGY4    Patient evaluated at the bedside. No acute events.  not requiring oxygen overnight. HR low 100s which is usual,   goes down during the day. no pain. states she saw a small amount  of white while wiping, no brown/no evidence of stool from below,  could be topical yeast cream.  she is tolerating a regular diet.  OOB to chair, taking a few steps when working with PT.    T(C): 37.6 (04-15-19 @ 05:54), Max: 37.6 (04-15-19 @ 05:54)  HR: 106 (04-15-19 @ 05:54) (91 - 106)  BP: 108/73 (04-15-19 @ 05:54) (104/71 - 121/83)  RR: 17 (04-15-19 @ 05:54) (17 - 18)  SpO2: 95% (04-15-19 @ 05:54) (95% - 98%)    GEN: NAD, resting  LUNGS: no respiratory distress, +crackles in left lower lung, improved breath sounds in right lower lung  ABD: soft, non tender, non distended, ostomy with stool and air  EXT: no calf tenderness, SCDs on, motor strength 4/5 however still significant weakness in feet        04-14 @ 07:01  -  04-15 @ 07:00  --------------------------------------------------------  IN: 600 mL / OUT: 1400 mL / NET: -800 mL        MEDICATIONS  (STANDING):  chlorhexidine 2% Cloths 1 Application(s) Topical <User Schedule>  enoxaparin Injectable 40 milliGRAM(s) SubCutaneous every 24 hours  ertapenem  IVPB 1000 milliGRAM(s) IV Intermittent every 24 hours  insulin lispro (HumaLOG) corrective regimen sliding scale   SubCutaneous Before meals and at bedtime  megestrol 80 milliGRAM(s) Oral two times a day  metFORMIN 1000 milliGRAM(s) Oral every 12 hours  nystatin/triamcinolone Cream 1 Application(s) Topical every 12 hours    MEDICATIONS  (PRN):  aluminum hydroxide/magnesium hydroxide/simethicone Suspension 30 milliLiter(s) Oral every 4 hours PRN Dyspepsia  famotidine    Tablet 20 milliGRAM(s) Oral daily PRN heartburn

## 2019-04-15 NOTE — PROGRESS NOTE ADULT - ASSESSMENT
58yo F HD15 with stage IV endometrial adenocarcinoma s/p staging surgery '18 and 1 round of chemotherapy 2/19 readmitted from [facility] with fever previously admitted for management of symptomatic rectovaginal and enterovaginal fistulas 2/22-3/25, bacteremic but now with negative cultures from 4/7. Last fever at 0855am 4/1.       1. 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 s/p thiamine. plan for outpatient IVIG administration. neuro signed off for now.  2. Pulm - cough and SOB improved,  saturates 96% on RA overnight; noted to have nodule in lung on CT, will f/u outpatient  Pulmonology Consulted- finding could be pneumonia vs atelectasis, currently on appropriate antibiotic treatment and improving and no pulm intervention needed at this time; Lower extremity dopplers negative  3. Cardio- Echo: within normal limits   4. FEN/GI - Reg diet. s/p TPN. PICC line removed - PICC tip cx grew MRSA; s/p vancomycin   - s/p elevated liver enzymes improved -GI consulted appreciate no further recs  - No current active leaking of stool  - replete electrolytes as needed   - monitor strict I/O and nutritional intake w/ calorie counts, pt improving with diet  - nutrition consulted will follow calorie counts in order to make suggestions, at this time recommend 1400calories per day, 66g protein, Glucerna shakes TID  pt not meeting requirements, encouraged to have shakes and increase calories  5.  - Joseph placed given urinary retention 3/16  - since pt is immobile and likely still has retention, keep joseph in  - Perianal irritation consistent with possible fungal infection- continue nystatin cream 3/23; apply to affected area as needed, improving w/ less irritation   - CT abdomen/pelvis w/ IV and oral contrast performed 3/15 showed improved enterovaginal fistula, no contrast in vagina, repeat CT on 4/5 showed no contrast in vagina again   6. ID: Bacteremia- Blood culture 4/1 positive for gram neg rods, coag neg staph, proteus. Blood culture from PICC 4/3 positive for Proteus ESBL  PICC tip cx significant for MRSA - s/p vancomycin 4/7-4/9  Blood cultures NGTD  ID following- continue IV 1g qd Ertapenem 4/4 until 4/16     Previously s/p Vancomycin; s/p Zosyn (4/1-4/3), and Meropenem (4/3-4/4)  7. Endocrine- FS, ISS, metformin for improved mortality   8. VTE prophylaxis - SCDs, ambulate as tolerated, Lovenox 40 daily, PT following    9. GYN malignancy  -stoppped anastrazole and initiated 3 weeks of megace 80mg BID followed by 3 weeks of tamoxifen. started megace and metformin 4/10.  10. Derm: monitor sacrum for s/s of pressure ulcer. encourage frequent changes in position and OOB during  the day; will apply moisturizing cream to dry skin on right heal and provide padding in form of pillow under heal for additional comfort  11. social work/dispo planning. Discharge likely Tuesday or Wednesday. 1) lovenox scripts with teaching 2) which [facility] to go to for her specific needs (neuro, ostomy, lovenox). 3) continue megace and metformin.

## 2019-04-16 LAB
ANION GAP SERPL CALC-SCNC: 11 MMOL/L — SIGNIFICANT CHANGE UP (ref 5–17)
BUN SERPL-MCNC: 26 MG/DL — HIGH (ref 7–23)
CALCIUM SERPL-MCNC: 10.2 MG/DL — SIGNIFICANT CHANGE UP (ref 8.4–10.5)
CHLORIDE SERPL-SCNC: 101 MMOL/L — SIGNIFICANT CHANGE UP (ref 96–108)
CO2 SERPL-SCNC: 26 MMOL/L — SIGNIFICANT CHANGE UP (ref 22–31)
CREAT SERPL-MCNC: 0.49 MG/DL — LOW (ref 0.5–1.3)
GLUCOSE BLDC GLUCOMTR-MCNC: 122 MG/DL — HIGH (ref 70–99)
GLUCOSE BLDC GLUCOMTR-MCNC: 123 MG/DL — HIGH (ref 70–99)
GLUCOSE BLDC GLUCOMTR-MCNC: 140 MG/DL — HIGH (ref 70–99)
GLUCOSE BLDC GLUCOMTR-MCNC: 148 MG/DL — HIGH (ref 70–99)
GLUCOSE SERPL-MCNC: 119 MG/DL — HIGH (ref 70–99)
HCT VFR BLD CALC: 28.2 % — LOW (ref 34.5–45)
HGB BLD-MCNC: 8.5 G/DL — LOW (ref 11.5–15.5)
MAGNESIUM SERPL-MCNC: 1.6 MG/DL — SIGNIFICANT CHANGE UP (ref 1.6–2.6)
MCHC RBC-ENTMCNC: 30.1 GM/DL — LOW (ref 32–36)
MCHC RBC-ENTMCNC: 30.7 PG — SIGNIFICANT CHANGE UP (ref 27–34)
MCV RBC AUTO: 101.8 FL — HIGH (ref 80–100)
NRBC # BLD: 0 /100 WBCS — SIGNIFICANT CHANGE UP (ref 0–0)
PHOSPHATE SERPL-MCNC: 4.2 MG/DL — SIGNIFICANT CHANGE UP (ref 2.5–4.5)
PLATELET # BLD AUTO: 278 K/UL — SIGNIFICANT CHANGE UP (ref 150–400)
POTASSIUM SERPL-MCNC: 4 MMOL/L — SIGNIFICANT CHANGE UP (ref 3.5–5.3)
POTASSIUM SERPL-SCNC: 4 MMOL/L — SIGNIFICANT CHANGE UP (ref 3.5–5.3)
RBC # BLD: 2.77 M/UL — LOW (ref 3.8–5.2)
RBC # FLD: 16.2 % — HIGH (ref 10.3–14.5)
SODIUM SERPL-SCNC: 138 MMOL/L — SIGNIFICANT CHANGE UP (ref 135–145)
WBC # BLD: 5.96 K/UL — SIGNIFICANT CHANGE UP (ref 3.8–10.5)
WBC # FLD AUTO: 5.96 K/UL — SIGNIFICANT CHANGE UP (ref 3.8–10.5)

## 2019-04-16 PROCEDURE — 99232 SBSQ HOSP IP/OBS MODERATE 35: CPT

## 2019-04-16 RX ORDER — MEGESTROL ACETATE 40 MG/ML
2 SUSPENSION ORAL
Qty: 56 | Refills: 0 | OUTPATIENT
Start: 2019-04-16 | End: 2019-04-29

## 2019-04-16 RX ORDER — METFORMIN HYDROCHLORIDE 850 MG/1
1 TABLET ORAL
Qty: 180 | Refills: 0 | OUTPATIENT
Start: 2019-04-16 | End: 2019-07-14

## 2019-04-16 RX ORDER — ENOXAPARIN SODIUM 100 MG/ML
40 INJECTION SUBCUTANEOUS
Qty: 40 | Refills: 0 | OUTPATIENT
Start: 2019-04-16 | End: 2019-07-14

## 2019-04-16 RX ORDER — IBUPROFEN 200 MG
600 TABLET ORAL EVERY 6 HOURS
Qty: 0 | Refills: 0 | Status: DISCONTINUED | OUTPATIENT
Start: 2019-04-16 | End: 2019-04-17

## 2019-04-16 RX ORDER — ENOXAPARIN SODIUM 100 MG/ML
40 INJECTION SUBCUTANEOUS
Qty: 90 | Refills: 0 | OUTPATIENT
Start: 2019-04-16 | End: 2019-07-14

## 2019-04-16 RX ORDER — MAGNESIUM OXIDE 400 MG ORAL TABLET 241.3 MG
400 TABLET ORAL ONCE
Qty: 0 | Refills: 0 | Status: COMPLETED | OUTPATIENT
Start: 2019-04-16 | End: 2019-04-16

## 2019-04-16 RX ADMIN — Medication 1 APPLICATION(S): at 18:33

## 2019-04-16 RX ADMIN — METFORMIN HYDROCHLORIDE 1000 MILLIGRAM(S): 850 TABLET ORAL at 18:33

## 2019-04-16 RX ADMIN — MAGNESIUM OXIDE 400 MG ORAL TABLET 400 MILLIGRAM(S): 241.3 TABLET ORAL at 08:03

## 2019-04-16 RX ADMIN — CHLORHEXIDINE GLUCONATE 1 APPLICATION(S): 213 SOLUTION TOPICAL at 07:24

## 2019-04-16 RX ADMIN — Medication 600 MILLIGRAM(S): at 22:10

## 2019-04-16 RX ADMIN — Medication 600 MILLIGRAM(S): at 23:15

## 2019-04-16 RX ADMIN — METFORMIN HYDROCHLORIDE 1000 MILLIGRAM(S): 850 TABLET ORAL at 07:24

## 2019-04-16 RX ADMIN — Medication 1 APPLICATION(S): at 07:24

## 2019-04-16 RX ADMIN — ERTAPENEM SODIUM 120 MILLIGRAM(S): 1 INJECTION, POWDER, LYOPHILIZED, FOR SOLUTION INTRAMUSCULAR; INTRAVENOUS at 18:32

## 2019-04-16 RX ADMIN — MEGESTROL ACETATE 80 MILLIGRAM(S): 40 SUSPENSION ORAL at 12:23

## 2019-04-16 RX ADMIN — ENOXAPARIN SODIUM 40 MILLIGRAM(S): 100 INJECTION SUBCUTANEOUS at 12:06

## 2019-04-16 NOTE — PROGRESS NOTE ADULT - ASSESSMENT
60yo F HD16 with stage IV endometrial adenocarcinoma s/p staging surgery '18 and 1 round of chemotherapy 2/19 readmitted from [facility] with fever previously admitted for management of symptomatic rectovaginal and enterovaginal fistulas 2/22-3/25, bacteremic but now with negative cultures from 4/7. Last fever at 0855am 4/1.       1. 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 s/p thiamine. plan for outpatient IVIG administration. neuro signed off for now.  2. Pulm - cough and SOB improved,  saturates 96% on RA overnight; noted to have nodule in lung on CT, will f/u outpatient  Pulmonology Consulted- finding could be pneumonia vs atelectasis, currently on appropriate antibiotic treatment and improving and no pulm intervention needed at this time; Lower extremity dopplers negative, signed off  3. Cardio- Echo: within normal limits   4. FEN/GI - Reg diet. s/p TPN. PICC line removed - PICC tip cx grew MRSA; s/p vancomycin   - s/p elevated liver enzymes improved -GI consulted appreciate no further recs  - No current active leaking of stool  - replete electrolytes as needed   - monitor strict I/O and nutritional intake w/ calorie counts, pt improving with diet  - nutrition consulted will follow calorie counts in order to make suggestions, at this time recommend 1400calories per day, 66g protein, Glucerna shakes TID pt not meeting requirements, encouraged to have shakes and increase calories  5.  - Joseph placed given urinary retention 3/16  - since pt is immobile and likely still has retention, keep joseph in  - Perianal irritation consistent with possible fungal infection- continue nystatin cream 3/23; apply to affected area as needed, improving w/ less irritation   - CT abdomen/pelvis w/ IV and oral contrast performed 3/15 showed improved enterovaginal fistula, no contrast in vagina, repeat CT on 4/5 showed no contrast in vagina again   6. ID: Bacteremia- Blood culture 4/1 positive for gram neg rods, coag neg staph, proteus. Blood culture from PICC 4/3 positive for Proteus ESBL  PICC tip cx significant for MRSA - s/p vancomycin 4/7-4/9  Blood cultures NGTD  ID following- continue IV 1g qd Ertapenem 4/4 until 4/16     Previously s/p Vancomycin; s/p Zosyn (4/1-4/3), and Meropenem (4/3-4/4)  7. Endocrine- FS, ISS, metformin for improved mortality   8. VTE prophylaxis - SCDs, ambulate as tolerated, Lovenox 40 daily, PT following    9. GYN malignancy  -stopped anastrazole and initiated 3 weeks of megace 80mg BID followed by 3 weeks of tamoxifen. started megace and metformin 4/10.  10. Derm: monitor sacrum for s/s of pressure ulcer. encourage frequent changes in position and OOB during  the day; will apply moisturizing cream to dry skin on right heal and provide padding in form of pillow under heal for additional comfort  11. social work/dispo planning. Discharge likely today or Wednesday. 1) lovenox scripts with teaching 2) which [facility] to go to for her specific needs (neuro, ostomy, lovenox). 3) continue megace and metformin. 60yo F HD16 with stage IV endometrial adenocarcinoma s/p staging surgery '18 and 1 round of chemotherapy 2/19 readmitted from [facility] with fever previously admitted for management of symptomatic rectovaginal and enterovaginal fistulas 2/22-3/25, bacteremic but now with negative cultures from 4/7. Last fever at 0855am 4/1.       1. 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 s/p thiamine. plan for outpatient IVIG administration  2. Pulm - cough and SOB improved,  saturates 96% on RA overnight; noted to have nodule in lung on CT, will f/u outpatient  Pulmonology Consulted- finding could be pneumonia vs atelectasis, currently on appropriate antibiotic treatment and improving and no pulm intervention needed at this time; Lower extremity dopplers negative, signed off  3. Cardio- Echo: within normal limits   4. FEN/GI - Reg diet. s/p TPN. PICC line removed - PICC tip cx grew MRSA; s/p vancomycin   - s/p elevated liver enzymes improved -GI consulted appreciate no further recs  - No current active leaking of stool  - replete electrolytes as needed   - monitor strict I/O and nutritional intake w/ calorie counts, pt improving with diet  - nutrition following- recommend Glucerna shakes TID, encouraged to have shakes and increase calories  5.  - Joseph placed given urinary retention 3/16- adequate output   - since pt is immobile and likely still has retention, keep joseph in  - Perianal irritation consistent with possible fungal infection- continue nystatin cream 3/23; apply to affected area as needed, improving w/ less irritation   - CT abdomen/pelvis w/ IV and oral contrast performed 3/15 showed improved enterovaginal fistula, no contrast in vagina, repeat CT on 4/5 showed no contrast in vagina again   6. ID: Bacteremia- Blood culture 4/1 positive for gram neg rods, coag neg staph, proteus. Blood culture from PICC 4/3 positive for Proteus ESBL  PICC tip cx significant for MRSA - s/p vancomycin 4/7-4/9  Blood cultures NGTD  ID following- continue IV 1g qd Ertapenem 4/4 until 4/16     Previously s/p Vancomycin; s/p Zosyn (4/1-4/3), and Meropenem (4/3-4/4)  7. Endocrine- FS, ISS, metformin for improved mortality   8. VTE prophylaxis - SCDs, ambulate as tolerated, Lovenox 40 daily, PT following    9. GYN malignancy  -stopped anastrazole and initiated 3 weeks of Megace 80mg BID followed by 3 weeks of tamoxifen. started megace and metformin 4/10.  10. Derm: monitor sacrum for s/s of pressure ulcer. encourage frequent changes in position and OOB during  the day; will apply moisturizing cream to dry skin on right heal and provide padding in form of pillow under heal for additional comfort  11. Social work- plan to continue megace and metformin in [facility]. Pt and family decided on [facility] for [facility]

## 2019-04-16 NOTE — PROGRESS NOTE ADULT - ATTENDING COMMENTS
Patient reports that she continues to feel that her upper extremity strength is improving. She denies pain, and reports no LOF per vagina.    She has selected a rehab facility that is convenient for her family. We will continue to work with SW for placement and discharge meds.

## 2019-04-16 NOTE — PROGRESS NOTE ADULT - SUBJECTIVE AND OBJECTIVE BOX
GYN PROGRESS NOTE PGY4    Patient evaluated at the bedside. No acute events.  she ambulated a few steps with PT yesterday.  tolerating full diet.  no desaturation overnight.  no pain.   today is the last day of IV antibiotics.    T(C): 36.6 (04-16-19 @ 05:27), Max: 37.3 (04-15-19 @ 08:15)  HR: 97 (04-16-19 @ 05:27) (84 - 100)  BP: 103/68 (04-16-19 @ 05:27) (103/68 - 117/78)  RR: 17 (04-16-19 @ 05:27) (17 - 17)  SpO2: 95% (04-16-19 @ 05:27) (95% - 98%)    GEN: patient appears well  LUNGS: no respiratory distress  ABD: soft, non tender, ostomy with air and stool, pressure bandage over sacrum  EXT: no calf tenderness, scds, significant weakness in feet        04-15 @ 07:01  -  04-16 @ 07:00  --------------------------------------------------------  IN: 420 mL / OUT: 351 mL / NET: 69 mL

## 2019-04-16 NOTE — PROGRESS NOTE ADULT - SUBJECTIVE AND OBJECTIVE BOX
Pt seen and examined at bedside. Pt has no acute complaints. She has ambulated from bed to chair today. PT has not come today yet. Pt is tolerating regular diet.   Pt denies fever, chills, chest pain, SOB, nausea, vomiting, lightheadedness, or dizziness.      T(F): 98.7 (04-16-19 @ 13:56), Max: 99.2 (04-15-19 @ 21:26)  HR: 105 (04-16-19 @ 13:56) (84 - 105)  BP: 110/71 (04-16-19 @ 13:56) (103/68 - 117/78)  RR: 16 (04-16-19 @ 13:56) (16 - 17)  SpO2: 95% (04-16-19 @ 13:56) (95% - 98%)  Wt(kg): --  I&O's Summary    15 Apr 2019 07:01  -  16 Apr 2019 07:00  --------------------------------------------------------  IN: 420 mL / OUT: 351 mL / NET: 69 mL    16 Apr 2019 07:01  -  16 Apr 2019 15:47  --------------------------------------------------------  IN: 480 mL / OUT: 550 mL / NET: -70 mL    MEDICATIONS  (STANDING):  chlorhexidine 2% Cloths 1 Application(s) Topical <User Schedule>  enoxaparin Injectable 40 milliGRAM(s) SubCutaneous every 24 hours  ertapenem  IVPB 1000 milliGRAM(s) IV Intermittent every 24 hours  insulin lispro (HumaLOG) corrective regimen sliding scale   SubCutaneous Before meals and at bedtime  megestrol 80 milliGRAM(s) Oral two times a day  metFORMIN 1000 milliGRAM(s) Oral every 12 hours  nystatin/triamcinolone Cream 1 Application(s) Topical every 12 hours    MEDICATIONS  (PRN):  aluminum hydroxide/magnesium hydroxide/simethicone Suspension 30 milliLiter(s) Oral every 4 hours PRN Dyspepsia  famotidine    Tablet 20 milliGRAM(s) Oral daily PRN heartburn    Physical Exam:  Constitutional: NAD  Pulmonary: clear to auscultation bilaterally   Cardiovascular: Regular rate and rhythm   Abdomen: Ostomy in place, intact with brown stool. Soft, nontender, nondistended, no guarding, no rebound, +bowel sounds  Extremities: no lower extremity edema or calve tenderness. SCDs in place     LABS:                        8.5    5.96  )-----------( 278      ( 16 Apr 2019 06:39 )             28.2     04-16    138  |  101  |  26<H>  ----------------------------<  119<H>  4.0   |  26  |  0.49<L>    Ca    10.2      16 Apr 2019 06:39  Phos  4.2     04-16  Mg     1.6     04-16

## 2019-04-16 NOTE — PROGRESS NOTE ADULT - ASSESSMENT
58yo F HD16 with stage IV endometrial adenocarcinoma s/p staging surgery '18 and 1 round of chemotherapy 2/19 readmitted from [facility] with fever previously admitted for management of symptomatic rectovaginal and enterovaginal fistulas 2/22-3/25, bacteremic but now with negative cultures from 4/7. Last fever at 0855am 4/1.       1. 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 s/p thiamine. plan for outpatient IVIG administration. neuro signed off for now.  2. Pulm - cough and SOB improved,  saturates 96% on RA overnight; noted to have nodule in lung on CT, will f/u outpatient  Pulmonology Consulted- finding could be pneumonia vs atelectasis, currently on appropriate antibiotic treatment and improving and no pulm intervention needed at this time; Lower extremity dopplers negative, signed off  3. Cardio- Echo: within normal limits   4. FEN/GI - Reg diet. s/p TPN. PICC line removed - PICC tip cx grew MRSA; s/p vancomycin   - s/p elevated liver enzymes improved -GI consulted appreciate no further recs  - No current active leaking of stool  - replete electrolytes as needed   - monitor strict I/O and nutritional intake w/ calorie counts, pt improving with diet  - nutrition consulted will follow calorie counts in order to make suggestions, at this time recommend 1400calories per day, 66g protein, Glucerna shakes TID pt not meeting requirements, encouraged to have shakes and increase calories  5.  - Joseph placed given urinary retention 3/16  - since pt is immobile and likely still has retention, keep joseph in  - Perianal irritation consistent with possible fungal infection- continue nystatin cream 3/23; apply to affected area as needed, improving w/ less irritation   - CT abdomen/pelvis w/ IV and oral contrast performed 3/15 showed improved enterovaginal fistula, no contrast in vagina, repeat CT on 4/5 showed no contrast in vagina again   6. ID: Bacteremia- Blood culture 4/1 positive for gram neg rods, coag neg staph, proteus. Blood culture from PICC 4/3 positive for Proteus ESBL  PICC tip cx significant for MRSA - s/p vancomycin 4/7-4/9  Blood cultures NGTD  ID following- continue IV 1g qd Ertapenem 4/4 until 4/16     Previously s/p Vancomycin; s/p Zosyn (4/1-4/3), and Meropenem (4/3-4/4)  7. Endocrine- FS, ISS, metformin for improved mortality   8. VTE prophylaxis - SCDs, ambulate as tolerated, Lovenox 40 daily, PT following    9. GYN malignancy  -stopped anastrazole and initiated 3 weeks of megace 80mg BID followed by 3 weeks of tamoxifen. started megace and metformin 4/10.  10. Derm: monitor sacrum for s/s of pressure ulcer. encourage frequent changes in position and OOB during  the day; will apply moisturizing cream to dry skin on right heal and provide padding in form of pillow under heal for additional comfort  11. social work/dispo planning. Discharge likely today or Wednesday. 1) lovenox scripts with teaching 2) which [facility] to go to for her specific needs (neuro, ostomy, lovenox). 3) continue megace and metformin.

## 2019-04-17 ENCOUNTER — TRANSCRIPTION ENCOUNTER (OUTPATIENT)
Age: 59
End: 2019-04-17

## 2019-04-17 VITALS
SYSTOLIC BLOOD PRESSURE: 108 MMHG | DIASTOLIC BLOOD PRESSURE: 76 MMHG | TEMPERATURE: 98 F | HEART RATE: 94 BPM | OXYGEN SATURATION: 98 % | RESPIRATION RATE: 16 BRPM

## 2019-04-17 LAB
GLUCOSE BLDC GLUCOMTR-MCNC: 119 MG/DL — HIGH (ref 70–99)
GLUCOSE BLDC GLUCOMTR-MCNC: 132 MG/DL — HIGH (ref 70–99)

## 2019-04-17 PROCEDURE — 86803 HEPATITIS C AB TEST: CPT

## 2019-04-17 PROCEDURE — 84165 PROTEIN E-PHORESIS SERUM: CPT

## 2019-04-17 PROCEDURE — 82784 ASSAY IGA/IGD/IGG/IGM EACH: CPT

## 2019-04-17 PROCEDURE — 87070 CULTURE OTHR SPECIMN AEROBIC: CPT

## 2019-04-17 PROCEDURE — 83036 HEMOGLOBIN GLYCOSYLATED A1C: CPT

## 2019-04-17 PROCEDURE — 85027 COMPLETE CBC AUTOMATED: CPT

## 2019-04-17 PROCEDURE — 83615 LACTATE (LD) (LDH) ENZYME: CPT

## 2019-04-17 PROCEDURE — 86695 HERPES SIMPLEX TYPE 1 TEST: CPT

## 2019-04-17 PROCEDURE — 82728 ASSAY OF FERRITIN: CPT

## 2019-04-17 PROCEDURE — 83520 IMMUNOASSAY QUANT NOS NONAB: CPT

## 2019-04-17 PROCEDURE — 87633 RESP VIRUS 12-25 TARGETS: CPT

## 2019-04-17 PROCEDURE — 84155 ASSAY OF PROTEIN SERUM: CPT

## 2019-04-17 PROCEDURE — 82525 ASSAY OF COPPER: CPT

## 2019-04-17 PROCEDURE — 93970 EXTREMITY STUDY: CPT

## 2019-04-17 PROCEDURE — 83540 ASSAY OF IRON: CPT

## 2019-04-17 PROCEDURE — 85652 RBC SED RATE AUTOMATED: CPT

## 2019-04-17 PROCEDURE — 87798 DETECT AGENT NOS DNA AMP: CPT

## 2019-04-17 PROCEDURE — 87150 DNA/RNA AMPLIFIED PROBE: CPT

## 2019-04-17 PROCEDURE — 72149 MRI LUMBAR SPINE W/DYE: CPT

## 2019-04-17 PROCEDURE — 86663 EPSTEIN-BARR ANTIBODY: CPT

## 2019-04-17 PROCEDURE — 86376 MICROSOMAL ANTIBODY EACH: CPT

## 2019-04-17 PROCEDURE — 93306 TTE W/DOPPLER COMPLETE: CPT

## 2019-04-17 PROCEDURE — 86900 BLOOD TYPING SEROLOGIC ABO: CPT

## 2019-04-17 PROCEDURE — 86255 FLUORESCENT ANTIBODY SCREEN: CPT

## 2019-04-17 PROCEDURE — 86664 EPSTEIN-BARR NUCLEAR ANTIGEN: CPT

## 2019-04-17 PROCEDURE — 86140 C-REACTIVE PROTEIN: CPT

## 2019-04-17 PROCEDURE — 87040 BLOOD CULTURE FOR BACTERIA: CPT

## 2019-04-17 PROCEDURE — 81332 SERPINA1 GENE: CPT

## 2019-04-17 PROCEDURE — 86704 HEP B CORE ANTIBODY TOTAL: CPT

## 2019-04-17 PROCEDURE — 84425 ASSAY OF VITAMIN B-1: CPT

## 2019-04-17 PROCEDURE — 86850 RBC ANTIBODY SCREEN: CPT

## 2019-04-17 PROCEDURE — 85025 COMPLETE CBC W/AUTO DIFF WBC: CPT

## 2019-04-17 PROCEDURE — 97110 THERAPEUTIC EXERCISES: CPT

## 2019-04-17 PROCEDURE — 87486 CHLMYD PNEUM DNA AMP PROBE: CPT

## 2019-04-17 PROCEDURE — 82962 GLUCOSE BLOOD TEST: CPT

## 2019-04-17 PROCEDURE — 87186 SC STD MICRODIL/AGAR DIL: CPT

## 2019-04-17 PROCEDURE — 74177 CT ABD & PELVIS W/CONTRAST: CPT

## 2019-04-17 PROCEDURE — 97530 THERAPEUTIC ACTIVITIES: CPT

## 2019-04-17 PROCEDURE — 86901 BLOOD TYPING SEROLOGIC RH(D): CPT

## 2019-04-17 PROCEDURE — 71045 X-RAY EXAM CHEST 1 VIEW: CPT

## 2019-04-17 PROCEDURE — 87581 M.PNEUMON DNA AMP PROBE: CPT

## 2019-04-17 PROCEDURE — 80307 DRUG TEST PRSMV CHEM ANLYZR: CPT

## 2019-04-17 PROCEDURE — 82607 VITAMIN B-12: CPT

## 2019-04-17 PROCEDURE — 93005 ELECTROCARDIOGRAM TRACING: CPT

## 2019-04-17 PROCEDURE — 76705 ECHO EXAM OF ABDOMEN: CPT

## 2019-04-17 PROCEDURE — 99238 HOSP IP/OBS DSCHRG MGMT 30/<: CPT

## 2019-04-17 PROCEDURE — 84446 ASSAY OF VITAMIN E: CPT

## 2019-04-17 PROCEDURE — 82550 ASSAY OF CK (CPK): CPT

## 2019-04-17 PROCEDURE — 86334 IMMUNOFIX E-PHORESIS SERUM: CPT

## 2019-04-17 PROCEDURE — 83605 ASSAY OF LACTIC ACID: CPT

## 2019-04-17 PROCEDURE — 86696 HERPES SIMPLEX TYPE 2 TEST: CPT

## 2019-04-17 PROCEDURE — 84443 ASSAY THYROID STIM HORMONE: CPT

## 2019-04-17 PROCEDURE — 86709 HEPATITIS A IGM ANTIBODY: CPT

## 2019-04-17 PROCEDURE — 82248 BILIRUBIN DIRECT: CPT

## 2019-04-17 PROCEDURE — 84207 ASSAY OF VITAMIN B-6: CPT

## 2019-04-17 PROCEDURE — 96375 TX/PRO/DX INJ NEW DRUG ADDON: CPT

## 2019-04-17 PROCEDURE — 83516 IMMUNOASSAY NONANTIBODY: CPT

## 2019-04-17 PROCEDURE — 85384 FIBRINOGEN ACTIVITY: CPT

## 2019-04-17 PROCEDURE — 85610 PROTHROMBIN TIME: CPT

## 2019-04-17 PROCEDURE — 87340 HEPATITIS B SURFACE AG IA: CPT

## 2019-04-17 PROCEDURE — 84100 ASSAY OF PHOSPHORUS: CPT

## 2019-04-17 PROCEDURE — 86665 EPSTEIN-BARR CAPSID VCA: CPT

## 2019-04-17 PROCEDURE — 97116 GAIT TRAINING THERAPY: CPT

## 2019-04-17 PROCEDURE — 86235 NUCLEAR ANTIGEN ANTIBODY: CPT

## 2019-04-17 PROCEDURE — 99285 EMERGENCY DEPT VISIT HI MDM: CPT | Mod: 25

## 2019-04-17 PROCEDURE — 96374 THER/PROPH/DIAG INJ IV PUSH: CPT

## 2019-04-17 PROCEDURE — 36415 COLL VENOUS BLD VENIPUNCTURE: CPT

## 2019-04-17 PROCEDURE — 71260 CT THORAX DX C+: CPT

## 2019-04-17 PROCEDURE — 82803 BLOOD GASES ANY COMBINATION: CPT

## 2019-04-17 PROCEDURE — 97161 PT EVAL LOW COMPLEX 20 MIN: CPT

## 2019-04-17 PROCEDURE — 82085 ASSAY OF ALDOLASE: CPT

## 2019-04-17 PROCEDURE — 86381 MITOCHONDRIAL ANTIBODY EACH: CPT

## 2019-04-17 PROCEDURE — 86304 IMMUNOASSAY TUMOR CA 125: CPT

## 2019-04-17 PROCEDURE — 86038 ANTINUCLEAR ANTIBODIES: CPT

## 2019-04-17 PROCEDURE — 86225 DNA ANTIBODY NATIVE: CPT

## 2019-04-17 PROCEDURE — 85730 THROMBOPLASTIN TIME PARTIAL: CPT

## 2019-04-17 PROCEDURE — 85045 AUTOMATED RETICULOCYTE COUNT: CPT

## 2019-04-17 PROCEDURE — 80053 COMPREHEN METABOLIC PANEL: CPT

## 2019-04-17 PROCEDURE — 80048 BASIC METABOLIC PNL TOTAL CA: CPT

## 2019-04-17 PROCEDURE — 83735 ASSAY OF MAGNESIUM: CPT

## 2019-04-17 PROCEDURE — 87086 URINE CULTURE/COLONY COUNT: CPT

## 2019-04-17 PROCEDURE — 86706 HEP B SURFACE ANTIBODY: CPT

## 2019-04-17 PROCEDURE — 82746 ASSAY OF FOLIC ACID SERUM: CPT

## 2019-04-17 PROCEDURE — 83550 IRON BINDING TEST: CPT

## 2019-04-17 PROCEDURE — 83010 ASSAY OF HAPTOGLOBIN QUANT: CPT

## 2019-04-17 PROCEDURE — 83789 MASS SPECTROMETRY QUAL/QUAN: CPT

## 2019-04-17 PROCEDURE — 81001 URINALYSIS AUTO W/SCOPE: CPT

## 2019-04-17 PROCEDURE — A9585: CPT

## 2019-04-17 RX ORDER — MEGESTROL ACETATE 40 MG/ML
2 SUSPENSION ORAL
Qty: 0 | Refills: 0 | DISCHARGE
Start: 2019-04-17

## 2019-04-17 RX ORDER — ENOXAPARIN SODIUM 100 MG/ML
0 INJECTION SUBCUTANEOUS
Qty: 0 | Refills: 0 | DISCHARGE
Start: 2019-04-17

## 2019-04-17 RX ORDER — METFORMIN HYDROCHLORIDE 850 MG/1
1 TABLET ORAL
Qty: 0 | Refills: 0 | DISCHARGE
Start: 2019-04-17

## 2019-04-17 RX ADMIN — METFORMIN HYDROCHLORIDE 1000 MILLIGRAM(S): 850 TABLET ORAL at 06:32

## 2019-04-17 RX ADMIN — Medication 1 APPLICATION(S): at 06:32

## 2019-04-17 RX ADMIN — CHLORHEXIDINE GLUCONATE 1 APPLICATION(S): 213 SOLUTION TOPICAL at 06:37

## 2019-04-17 RX ADMIN — ENOXAPARIN SODIUM 40 MILLIGRAM(S): 100 INJECTION SUBCUTANEOUS at 12:28

## 2019-04-17 NOTE — DISCHARGE NOTE NURSING/CASE MANAGEMENT/SOCIAL WORK - NSDCDPATPORTLINK_GEN_ALL_CORE
You can access the [facility] Patient Portal, offered by [facility], by registering with the following website: [email or web address]

## 2019-04-17 NOTE — PROGRESS NOTE ADULT - PROVIDER SPECIALTY LIST ADULT
GYN
Gastroenterology
Gyn Onc
Heme/Onc
Infectious Disease
Neurology
Pulmonology
Pulmonology
GYN
Gyn Onc

## 2019-04-17 NOTE — PROGRESS NOTE ADULT - ASSESSMENT
PENDING  60yo F HD17 with stage IV endometrial adenocarcinoma s/p staging surgery '18 and 1 round of chemotherapy 2/19 readmitted from [facility] with fever previously admitted for management of symptomatic rectovaginal and enterovaginal fistulas 2/22-3/25, bacteremic but now with negative cultures from 4/7. Last fever at 0855am 4/1.       1. 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 s/p thiamine. plan for outpatient IVIG administration. neuro signed off for now.  2. Pulm - cough and SOB improved,  saturates 96% on RA overnight; noted to have nodule in lung on CT, will f/u outpatient  Pulmonology Consulted- finding could be pneumonia vs atelectasis, currently on appropriate antibiotic treatment and improving and no pulm intervention needed at this time; Lower extremity dopplers negative, signed off  3. Cardio- Echo: within normal limits   4. FEN/GI - Reg diet. s/p TPN. PICC line removed - PICC tip cx grew MRSA; s/p vancomycin   - s/p elevated liver enzymes improved -GI consulted appreciate no further recs  - No current active leaking of stool  - replete electrolytes as needed   - monitor strict I/O and nutritional intake w/ calorie counts, pt improving with diet  - nutrition consulted will follow calorie counts in order to make suggestions, at this time recommend 1400calories per day, 66g protein, Glucerna shakes TID pt not meeting requirements, encouraged to have shakes and increase calories  5.  - Joseph placed given urinary retention 3/16  - since pt is immobile and likely still has retention, keep joseph in  - Perianal irritation consistent with possible fungal infection- continue nystatin cream 3/23; apply to affected area as needed, improving w/ less irritation   - CT abdomen/pelvis w/ IV and oral contrast performed 3/15 showed improved enterovaginal fistula, no contrast in vagina, repeat CT on 4/5 showed no contrast in vagina again   6. ID: Bacteremia- Blood culture 4/1 positive for gram neg rods, coag neg staph, proteus. Blood culture from PICC 4/3 positive for Proteus ESBL  PICC tip cx significant for MRSA - s/p vancomycin 4/7-4/9  Blood cultures NGTD  ID following- continue IV 1g qd Ertapenem 4/4 until 4/16     Previously s/p Vancomycin; s/p Zosyn (4/1-4/3), and Meropenem (4/3-4/4)  7. Endocrine- FS, ISS, metformin for improved mortality   8. VTE prophylaxis - SCDs, ambulate as tolerated, Lovenox 40 daily, PT following    9. GYN malignancy  -stopped anastrazole and initiated 3 weeks of megace 80mg BID followed by 3 weeks of tamoxifen. started megace and metformin 4/10.  10. Derm: monitor sacrum for s/s of pressure ulcer. encourage frequent changes in position and OOB during  the day; will apply moisturizing cream to dry skin on right heal and provide padding in form of pillow under heal for additional comfort  11. social work/dispo planning. Discharge likely today or Wednesday. 1) lovenox scripts with teaching 2) which [facility] to go to for her specific needs (neuro, ostomy, lovenox). 3) continue megace and metformin. 58yo F HD17 with stage IV endometrial adenocarcinoma s/p staging surgery '18 and 1 round of chemotherapy 2/19 readmitted from [facility] with fever previously admitted for management of symptomatic rectovaginal and enterovaginal fistulas 2/22-3/25, bacteremic but now with negative cultures from 4/7. Last fever at 0855am 4/1.       1. 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 s/p thiamine. plan for outpatient IVIG administration. neuro signed off for now.  2. Pulm - cough and SOB improved,  saturates 96% on RA overnight; noted to have nodule in lung on CT, will f/u outpatient  Pulmonology Consulted- finding could be pneumonia vs atelectasis, currently on appropriate antibiotic treatment and improving and no pulm intervention needed at this time; Lower extremity dopplers negative, signed off  3. Cardio- Echo: within normal limits   4. FEN/GI - Reg diet. s/p TPN. PICC line removed - PICC tip cx grew MRSA; s/p vancomycin   - s/p elevated liver enzymes improved -GI consulted appreciate no further recs  - No current active leaking of stool  - replete electrolytes as needed   - monitor strict I/O and nutritional intake w/ calorie counts, pt improving with diet  - nutrition consulted will follow calorie counts in order to make suggestions, at this time recommend 1400calories per day, 66g protein, Glucerna shakes TID pt not meeting requirements, encouraged to have shakes and increase calories  5.  - Joseph placed given urinary retention 3/16  - since pt is immobile and likely still has retention, keep joseph in  - Perianal irritation consistent with possible fungal infection- continue nystatin cream 3/23; apply to affected area as needed, improving w/ less irritation   - CT abdomen/pelvis w/ IV and oral contrast performed 3/15 showed improved enterovaginal fistula, no contrast in vagina, repeat CT on 4/5 showed no contrast in vagina again   6. ID: Bacteremia- Blood culture 4/1 positive for gram neg rods, coag neg staph, proteus. Blood culture from PICC 4/3 positive for Proteus ESBL  PICC tip cx significant for MRSA - s/p vancomycin 4/7-4/9  Blood cultures NGTD  ID following- continue IV 1g qd Ertapenem 4/4 until 4/16     Previously s/p Vancomycin; s/p Zosyn (4/1-4/3), and Meropenem (4/3-4/4)  7. Endocrine- FS, ISS, metformin for improved mortality   8. VTE prophylaxis - SCDs, ambulate as tolerated, Lovenox 40 daily, PT following    9. GYN malignancy  -stopped anastrazole and initiated 3 weeks of megace 80mg BID followed by 3 weeks of tamoxifen. started megace and metformin 4/10.  10. Derm: monitor sacrum for s/s of pressure ulcer. encourage frequent changes in position and OOB during  the day; will apply moisturizing cream to dry skin on right heal and provide padding in form of pillow under heal for additional comfort  11. social work/dispo planning. Discharge likely today. 1) lovenox scripts with teaching [x] 2) which [facility] to go to for her specific needs (neuro, ostomy, lovenox).[x] 3) continue megace and metformin. - scripts sent, needs to  megace before discharge.

## 2019-04-17 NOTE — DISCHARGE NOTE PROVIDER - HOSPITAL COURSE
Admitted from [facility] with fever, found to be bacteremic. Treated with IV antibiotics. PICC line removed because it was a source of infection. TPN stopped. Patient tolerating regular diet. Seen by Neurology for weakness, diagnosed with potential CIDP, treated with IVIG with some response. Started on Megace/Tamoxifen (tamoxifen to be started later per the course) and metformin for cancer treatment. Followed by Nutrition, PT, ID, neuro, GYN ONC. Improvement in clinical course. Discharged to [facility] mainly for needs of weakness, ostomy, joseph.

## 2019-04-17 NOTE — DISCHARGE NOTE PROVIDER - NSDCFUADDINST_GEN_ALL_CORE_FT
1. Take the Megace 80mg twice a day until 4/30/19.  2. Start Tamoxifen 20mg twice a day on 5/1/19.  3. Continue Metformin 1000mg BID.  4. Continue Lovenox injections 40mg daily.

## 2019-04-17 NOTE — PROGRESS NOTE ADULT - SUBJECTIVE AND OBJECTIVE BOX
GYN PROGRESS NOTE PGY4    PENDING.    T(C): 36.6 (04-17-19 @ 05:29), Max: 37.6 (04-16-19 @ 17:07)  HR: 89 (04-17-19 @ 05:29) (65 - 106)  BP: 109/74 (04-17-19 @ 05:29) (101/62 - 120/79)  RR: 16 (04-17-19 @ 05:29) (16 - 17)  SpO2: 97% (04-17-19 @ 05:29) (95% - 98%)    PENDING  GEN: patient appears well  LUNGS: no respiratory distress  ABD: soft, non tender, ostomy with air and stool, pressure bandage over sacrum  EXT: no calf tenderness, scds, significant weakness in feet    04-15 @ 07:01  -  04-16 @ 07:00  --------------------------------------------------------  IN: 420 mL / OUT: 351 mL / NET: 69 mL    04-16 @ 07:01  -  04-17 @ 06:36  --------------------------------------------------------  IN: 720 mL / OUT: 1051 mL / NET: -331 mL GYN PROGRESS NOTE PGY4    Awake. no issues overnight, mild knee pain yesterday.    T(C): 36.6 (04-17-19 @ 05:29), Max: 37.6 (04-16-19 @ 17:07)  HR: 89 (04-17-19 @ 05:29) (65 - 106)  BP: 109/74 (04-17-19 @ 05:29) (101/62 - 120/79)  RR: 16 (04-17-19 @ 05:29) (16 - 17)  SpO2: 97% (04-17-19 @ 05:29) (95% - 98%)      GEN: patient appears well  LUNGS: no respiratory distress  ABD: soft, non tender, ostomy with air and stool  EXT: no calf tenderness, scds, significant weakness in feet    04-15 @ 07:01  -  04-16 @ 07:00  --------------------------------------------------------  IN: 420 mL / OUT: 351 mL / NET: 69 mL    04-16 @ 07:01  -  04-17 @ 06:36  --------------------------------------------------------  IN: 720 mL / OUT: 1051 mL / NET: -331 mL

## 2019-04-17 NOTE — DISCHARGE NOTE NURSING/CASE MANAGEMENT/SOCIAL WORK - NSDCPNDISPN_GEN_ALL_CORE
Activities of daily living, including home environment that might     exacerbate pain or reduce effectiveness of the pain management plan of care as well as strategies to address these issues/Education provided on the pain management plan of care/Side effects of pain management treatment/Safe use, storage and disposal of opioids when prescribed

## 2019-04-17 NOTE — DISCHARGE NOTE PROVIDER - CARE PROVIDER_API CALL
Alejandra Spivey (DO)  Gynecologic Oncology; Obstetrics and Gynecology  [address] [place] [address]  Phone: [phone]  Fax: [phone]  Follow Up Time:

## 2019-04-22 DIAGNOSIS — R91.1 SOLITARY PULMONARY NODULE: ICD-10-CM

## 2019-04-22 DIAGNOSIS — K21.9 GASTRO-ESOPHAGEAL REFLUX DISEASE WITHOUT ESOPHAGITIS: ICD-10-CM

## 2019-04-22 DIAGNOSIS — R50.9 FEVER, UNSPECIFIED: ICD-10-CM

## 2019-04-22 DIAGNOSIS — G61.81 CHRONIC INFLAMMATORY DEMYELINATING POLYNEURITIS: ICD-10-CM

## 2019-04-22 DIAGNOSIS — A41.59 OTHER GRAM-NEGATIVE SEPSIS: ICD-10-CM

## 2019-04-22 DIAGNOSIS — B96.4 PROTEUS (MIRABILIS) (MORGANII) AS THE CAUSE OF DISEASES CLASSIFIED ELSEWHERE: ICD-10-CM

## 2019-04-22 DIAGNOSIS — Z86.12 PERSONAL HISTORY OF POLIOMYELITIS: ICD-10-CM
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
## 2019-04-22 DIAGNOSIS — N82.2 FISTULA OF VAGINA TO SMALL INTESTINE: ICD-10-CM

## 2019-04-22 DIAGNOSIS — B95.2 ENTEROCOCCUS AS THE CAUSE OF DISEASES CLASSIFIED ELSEWHERE: ICD-10-CM

## 2019-04-22 DIAGNOSIS — R79.89 OTHER SPECIFIED ABNORMAL FINDINGS OF BLOOD CHEMISTRY: ICD-10-CM

## 2019-04-22 DIAGNOSIS — M48.061 SPINAL STENOSIS, LUMBAR REGION WITHOUT NEUROGENIC CLAUDICATION: ICD-10-CM

## 2019-04-22 DIAGNOSIS — Z85.3 PERSONAL HISTORY OF MALIGNANT NEOPLASM OF BREAST: ICD-10-CM

## 2019-04-22 DIAGNOSIS — M40.209 UNSPECIFIED KYPHOSIS, SITE UNSPECIFIED: ICD-10-CM

## 2019-04-22 DIAGNOSIS — B35.6 TINEA CRURIS: ICD-10-CM

## 2019-04-22 DIAGNOSIS — E11.40 TYPE 2 DIABETES MELLITUS WITH DIABETIC NEUROPATHY, UNSPECIFIED: ICD-10-CM

## 2019-04-22 DIAGNOSIS — N39.0 URINARY TRACT INFECTION, SITE NOT SPECIFIED: ICD-10-CM

## 2019-04-22 DIAGNOSIS — T80.211A BLOODSTREAM INFECTION DUE TO CENTRAL VENOUS CATHETER, INITIAL ENCOUNTER: ICD-10-CM

## 2019-04-22 DIAGNOSIS — T80.218A OTHER INFECTION DUE TO CENTRAL VENOUS CATHETER, INITIAL ENCOUNTER: ICD-10-CM

## 2019-04-22 DIAGNOSIS — A41.9 SEPSIS, UNSPECIFIED ORGANISM: ICD-10-CM

## 2019-04-22 DIAGNOSIS — D53.9 NUTRITIONAL ANEMIA, UNSPECIFIED: ICD-10-CM

## 2019-04-22 DIAGNOSIS — J90 PLEURAL EFFUSION, NOT ELSEWHERE CLASSIFIED: ICD-10-CM

## 2019-04-22 DIAGNOSIS — J98.11 ATELECTASIS: ICD-10-CM

## 2019-04-22 DIAGNOSIS — C54.1 MALIGNANT NEOPLASM OF ENDOMETRIUM: ICD-10-CM

## 2019-04-23 LAB — PARANEOPLASTIC AB PNL SER: SIGNIFICANT CHANGE UP

## 2019-04-24 ENCOUNTER — APPOINTMENT (OUTPATIENT)
Dept: GYNECOLOGIC ONCOLOGY | Facility: CLINIC | Age: 59
End: 2019-04-24
Payer: MEDICARE

## 2019-04-26 ENCOUNTER — APPOINTMENT (OUTPATIENT)
Dept: GYNECOLOGIC ONCOLOGY | Facility: CLINIC | Age: 59
End: 2019-04-26
Payer: MEDICARE

## 2019-04-26 ENCOUNTER — LABORATORY RESULT (OUTPATIENT)
Age: 59
End: 2019-04-26

## 2019-04-26 VITALS
OXYGEN SATURATION: 97 % | BODY MASS INDEX: 23.16 KG/M2 | HEIGHT: 60 IN | SYSTOLIC BLOOD PRESSURE: 127 MMHG | HEART RATE: 107 BPM | WEIGHT: 118 LBS | DIASTOLIC BLOOD PRESSURE: 85 MMHG

## 2019-04-26 DIAGNOSIS — G14 POSTPOLIO SYNDROME: ICD-10-CM

## 2019-04-26 DIAGNOSIS — N82.3 FISTULA OF VAGINA TO LARGE INTESTINE: ICD-10-CM

## 2019-04-26 DIAGNOSIS — Z87.898 PERSONAL HISTORY OF OTHER SPECIFIED CONDITIONS: ICD-10-CM

## 2019-04-26 DIAGNOSIS — N82.4 OTHER FEMALE INTESTINAL-GENITAL TRACT FISTULAE: ICD-10-CM

## 2019-04-26 DIAGNOSIS — Z78.9 OTHER SPECIFIED HEALTH STATUS: ICD-10-CM

## 2019-04-26 DIAGNOSIS — C54.1 MALIGNANT NEOPLASM OF ENDOMETRIUM: ICD-10-CM

## 2019-04-26 PROCEDURE — 99214 OFFICE O/P EST MOD 30 MIN: CPT

## 2019-04-26 PROCEDURE — 36415 COLL VENOUS BLD VENIPUNCTURE: CPT

## 2019-04-29 ENCOUNTER — INPATIENT (INPATIENT)
Facility: HOSPITAL | Age: 59
LOS: 50 days | Discharge: NURSING, REHAB OR LONG-TERM CARE | DRG: 4 | End: 2019-06-19
Payer: MEDICARE

## 2019-04-29 VITALS
TEMPERATURE: 99 F | HEART RATE: 110 BPM | WEIGHT: 117.95 LBS | OXYGEN SATURATION: 96 % | SYSTOLIC BLOOD PRESSURE: 114 MMHG | RESPIRATION RATE: 18 BRPM | DIASTOLIC BLOOD PRESSURE: 68 MMHG

## 2019-04-29 DIAGNOSIS — Z90.710 ACQUIRED ABSENCE OF BOTH CERVIX AND UTERUS: Chronic | ICD-10-CM

## 2019-04-29 LAB
ALBUMIN SERPL ELPH-MCNC: 3.1 G/DL — LOW (ref 3.3–5)
ALP SERPL-CCNC: 114 U/L — SIGNIFICANT CHANGE UP (ref 40–120)
ALT FLD-CCNC: 14 U/L — SIGNIFICANT CHANGE UP (ref 10–45)
ANION GAP SERPL CALC-SCNC: 13 MMOL/L — SIGNIFICANT CHANGE UP (ref 5–17)
APPEARANCE UR: CLEAR — SIGNIFICANT CHANGE UP
APTT BLD: 27.3 SEC — LOW (ref 27.5–36.3)
AST SERPL-CCNC: 18 U/L — SIGNIFICANT CHANGE UP (ref 10–40)
BASOPHILS # BLD AUTO: 0.03 K/UL — SIGNIFICANT CHANGE UP (ref 0–0.2)
BASOPHILS NFR BLD AUTO: 0.2 % — SIGNIFICANT CHANGE UP (ref 0–2)
BILIRUB SERPL-MCNC: 0.2 MG/DL — SIGNIFICANT CHANGE UP (ref 0.2–1.2)
BILIRUB UR-MCNC: NEGATIVE — SIGNIFICANT CHANGE UP
BUN SERPL-MCNC: 42 MG/DL — HIGH (ref 7–23)
CALCIUM SERPL-MCNC: 9.6 MG/DL — SIGNIFICANT CHANGE UP (ref 8.4–10.5)
CHLORIDE SERPL-SCNC: 95 MMOL/L — LOW (ref 96–108)
CO2 SERPL-SCNC: 23 MMOL/L — SIGNIFICANT CHANGE UP (ref 22–31)
COLOR SPEC: YELLOW — SIGNIFICANT CHANGE UP
CREAT SERPL-MCNC: 1.03 MG/DL — SIGNIFICANT CHANGE UP (ref 0.5–1.3)
DIFF PNL FLD: ABNORMAL
EOSINOPHIL # BLD AUTO: 0.02 K/UL — SIGNIFICANT CHANGE UP (ref 0–0.5)
EOSINOPHIL NFR BLD AUTO: 0.1 % — SIGNIFICANT CHANGE UP (ref 0–6)
FLU A RESULT: SIGNIFICANT CHANGE UP
FLU A RESULT: SIGNIFICANT CHANGE UP
FLUAV AG NPH QL: SIGNIFICANT CHANGE UP
FLUBV AG NPH QL: SIGNIFICANT CHANGE UP
GLUCOSE SERPL-MCNC: 193 MG/DL — HIGH (ref 70–99)
GLUCOSE UR QL: NEGATIVE — SIGNIFICANT CHANGE UP
HCT VFR BLD CALC: 30.5 % — LOW (ref 34.5–45)
HGB BLD-MCNC: 9.4 G/DL — LOW (ref 11.5–15.5)
IMM GRANULOCYTES NFR BLD AUTO: 1.5 % — SIGNIFICANT CHANGE UP (ref 0–1.5)
INR BLD: 1.13 — SIGNIFICANT CHANGE UP (ref 0.88–1.16)
KETONES UR-MCNC: NEGATIVE — SIGNIFICANT CHANGE UP
LACTATE SERPL-SCNC: 1.6 MMOL/L — SIGNIFICANT CHANGE UP (ref 0.5–2)
LEUKOCYTE ESTERASE UR-ACNC: ABNORMAL
LIDOCAIN IGE QN: 80 U/L — HIGH (ref 7–60)
LYMPHOCYTES # BLD AUTO: 0.97 K/UL — LOW (ref 1–3.3)
LYMPHOCYTES # BLD AUTO: 5.1 % — LOW (ref 13–44)
MAGNESIUM SERPL-MCNC: 1.6 MG/DL — SIGNIFICANT CHANGE UP (ref 1.6–2.6)
MCHC RBC-ENTMCNC: 29.8 PG — SIGNIFICANT CHANGE UP (ref 27–34)
MCHC RBC-ENTMCNC: 30.8 GM/DL — LOW (ref 32–36)
MCV RBC AUTO: 96.8 FL — SIGNIFICANT CHANGE UP (ref 80–100)
MONOCYTES # BLD AUTO: 1.29 K/UL — HIGH (ref 0–0.9)
MONOCYTES NFR BLD AUTO: 6.8 % — SIGNIFICANT CHANGE UP (ref 2–14)
NEUTROPHILS # BLD AUTO: 16.35 K/UL — HIGH (ref 1.8–7.4)
NEUTROPHILS NFR BLD AUTO: 86.3 % — HIGH (ref 43–77)
NITRITE UR-MCNC: POSITIVE
NRBC # BLD: 0 /100 WBCS — SIGNIFICANT CHANGE UP (ref 0–0)
NT-PROBNP SERPL-SCNC: 1391 PG/ML — HIGH (ref 0–300)
PH UR: 5.5 — SIGNIFICANT CHANGE UP (ref 5–8)
PLATELET # BLD AUTO: 220 K/UL — SIGNIFICANT CHANGE UP (ref 150–400)
POTASSIUM SERPL-MCNC: 4.3 MMOL/L — SIGNIFICANT CHANGE UP (ref 3.5–5.3)
POTASSIUM SERPL-SCNC: 4.3 MMOL/L — SIGNIFICANT CHANGE UP (ref 3.5–5.3)
PROT SERPL-MCNC: 7.9 G/DL — SIGNIFICANT CHANGE UP (ref 6–8.3)
PROT UR-MCNC: 100 MG/DL
PROTHROM AB SERPL-ACNC: 12.8 SEC — SIGNIFICANT CHANGE UP (ref 10–12.9)
RBC # BLD: 3.15 M/UL — LOW (ref 3.8–5.2)
RBC # FLD: 14.8 % — HIGH (ref 10.3–14.5)
RSV RESULT: SIGNIFICANT CHANGE UP
RSV RNA RESP QL NAA+PROBE: SIGNIFICANT CHANGE UP
SODIUM SERPL-SCNC: 131 MMOL/L — LOW (ref 135–145)
SP GR SPEC: 1.02 — SIGNIFICANT CHANGE UP (ref 1–1.03)
TROPONIN T SERPL-MCNC: <0.01 NG/ML — SIGNIFICANT CHANGE UP (ref 0–0.01)
UROBILINOGEN FLD QL: 0.2 E.U./DL — SIGNIFICANT CHANGE UP
WBC # BLD: 18.95 K/UL — HIGH (ref 3.8–10.5)
WBC # FLD AUTO: 18.95 K/UL — HIGH (ref 3.8–10.5)

## 2019-04-29 PROCEDURE — 99285 EMERGENCY DEPT VISIT HI MDM: CPT | Mod: 25

## 2019-04-29 PROCEDURE — 71045 X-RAY EXAM CHEST 1 VIEW: CPT | Mod: 26

## 2019-04-29 PROCEDURE — 31500 INSERT EMERGENCY AIRWAY: CPT

## 2019-04-29 PROCEDURE — 43753 TX GASTRO INTUB W/ASP: CPT

## 2019-04-29 PROCEDURE — 99223 1ST HOSP IP/OBS HIGH 75: CPT

## 2019-04-29 PROCEDURE — 93010 ELECTROCARDIOGRAM REPORT: CPT | Mod: 59

## 2019-04-29 RX ORDER — ACETAMINOPHEN 500 MG
1000 TABLET ORAL ONCE
Qty: 0 | Refills: 0 | Status: DISCONTINUED | OUTPATIENT
Start: 2019-04-29 | End: 2019-04-30

## 2019-04-29 RX ORDER — ONDANSETRON 8 MG/1
8 TABLET, FILM COATED ORAL EVERY 8 HOURS
Qty: 0 | Refills: 0 | Status: DISCONTINUED | OUTPATIENT
Start: 2019-04-29 | End: 2019-05-09

## 2019-04-29 RX ORDER — ZINC OXIDE 200 MG/G
1 OINTMENT TOPICAL THREE TIMES A DAY
Qty: 0 | Refills: 0 | Status: DISCONTINUED | OUTPATIENT
Start: 2019-04-29 | End: 2019-06-08

## 2019-04-29 RX ORDER — KETOROLAC TROMETHAMINE 30 MG/ML
15 SYRINGE (ML) INJECTION ONCE
Qty: 0 | Refills: 0 | Status: DISCONTINUED | OUTPATIENT
Start: 2019-04-29 | End: 2019-04-29

## 2019-04-29 RX ORDER — VANCOMYCIN HCL 1 G
1000 VIAL (EA) INTRAVENOUS ONCE
Qty: 0 | Refills: 0 | Status: COMPLETED | OUTPATIENT
Start: 2019-04-29 | End: 2019-04-29

## 2019-04-29 RX ORDER — ONDANSETRON 8 MG/1
8 TABLET, FILM COATED ORAL ONCE
Qty: 0 | Refills: 0 | Status: COMPLETED | OUTPATIENT
Start: 2019-04-29 | End: 2019-04-29

## 2019-04-29 RX ORDER — MEROPENEM 1 G/30ML
1000 INJECTION INTRAVENOUS ONCE
Qty: 0 | Refills: 0 | Status: COMPLETED | OUTPATIENT
Start: 2019-04-29 | End: 2019-04-29

## 2019-04-29 RX ORDER — PANTOPRAZOLE SODIUM 20 MG/1
40 TABLET, DELAYED RELEASE ORAL EVERY 24 HOURS
Qty: 0 | Refills: 0 | Status: DISCONTINUED | OUTPATIENT
Start: 2019-04-29 | End: 2019-05-02

## 2019-04-29 RX ORDER — CEFTRIAXONE 500 MG/1
250 INJECTION, POWDER, FOR SOLUTION INTRAMUSCULAR; INTRAVENOUS ONCE
Qty: 0 | Refills: 0 | Status: DISCONTINUED | OUTPATIENT
Start: 2019-04-29 | End: 2019-04-29

## 2019-04-29 RX ORDER — SODIUM CHLORIDE 9 MG/ML
2000 INJECTION INTRAMUSCULAR; INTRAVENOUS; SUBCUTANEOUS ONCE
Qty: 0 | Refills: 0 | Status: COMPLETED | OUTPATIENT
Start: 2019-04-29 | End: 2019-04-29

## 2019-04-29 RX ORDER — ACETAMINOPHEN 500 MG
1000 TABLET ORAL ONCE
Qty: 0 | Refills: 0 | Status: COMPLETED | OUTPATIENT
Start: 2019-04-29 | End: 2019-04-29

## 2019-04-29 RX ORDER — SODIUM CHLORIDE 9 MG/ML
1000 INJECTION INTRAMUSCULAR; INTRAVENOUS; SUBCUTANEOUS
Qty: 0 | Refills: 0 | Status: DISCONTINUED | OUTPATIENT
Start: 2019-04-29 | End: 2019-05-01

## 2019-04-29 RX ORDER — SODIUM CHLORIDE 9 MG/ML
1000 INJECTION INTRAMUSCULAR; INTRAVENOUS; SUBCUTANEOUS ONCE
Qty: 0 | Refills: 0 | Status: DISCONTINUED | OUTPATIENT
Start: 2019-04-29 | End: 2019-04-29

## 2019-04-29 RX ORDER — MORPHINE SULFATE 50 MG/1
4 CAPSULE, EXTENDED RELEASE ORAL ONCE
Qty: 0 | Refills: 0 | Status: DISCONTINUED | OUTPATIENT
Start: 2019-04-29 | End: 2019-04-29

## 2019-04-29 RX ADMIN — SODIUM CHLORIDE 2000 MILLILITER(S): 9 INJECTION INTRAMUSCULAR; INTRAVENOUS; SUBCUTANEOUS at 22:13

## 2019-04-29 RX ADMIN — Medication 1000 MILLIGRAM(S): at 22:13

## 2019-04-29 RX ADMIN — MEROPENEM 100 MILLIGRAM(S): 1 INJECTION INTRAVENOUS at 22:14

## 2019-04-29 RX ADMIN — Medication 250 MILLIGRAM(S): at 23:00

## 2019-04-29 RX ADMIN — MORPHINE SULFATE 4 MILLIGRAM(S): 50 CAPSULE, EXTENDED RELEASE ORAL at 22:15

## 2019-04-29 RX ADMIN — ONDANSETRON 8 MILLIGRAM(S): 8 TABLET, FILM COATED ORAL at 22:15

## 2019-04-29 RX ADMIN — Medication 15 MILLIGRAM(S): at 22:14

## 2019-04-29 NOTE — ED PROVIDER NOTE - OBJECTIVE STATEMENT
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

## 2019-04-29 NOTE — ED ADULT TRIAGE NOTE - OTHER COMPLAINTS
biba from nursing home for tachycardia to 120's, pt c/o of cough, decrease appetite and epigastric pain,  has been nauseous and vomiting since today

## 2019-04-29 NOTE — H&P ADULT - HISTORY OF PRESENT ILLNESS
58 yo G0 w/ known stage IV endometrial carcinoma s/p staging surgery 7/2018 and 1 cycle of chemotherapy in 2/2019 followed by multiple admissions for management of symptomatic enterovaginal fistula, complex fistula associated urinary tract infection, bacteremia presents on referral from [facility] after fever today of 100.7. Pt reports poor PO intake, nausea w/ dry heaving and bringing up phlegm for the past 2 days. She also reports continued weakness. She denies chills, HA, dizziness, CP, palpitations, SOB, abdominal pain, vaginal bleeding, leakage of stool per vagina, abnormal vaginal discharge. She states she was not ambulating daily at [facility] however was working with PT there. 58 yo G0 w/ known stage IV endometrial carcinoma s/p staging surgery 7/2018 and 1 cycle of chemotherapy in 2/2019 followed by multiple admissions for management of symptomatic enterovaginal fistula, complex fistula associated urinary tract infection, bacteremia presents on referral from [facility] after fever today of 100.7. Pt reports poor PO intake, nausea w/ dry heaving and bringing up phlegm for the past 2 days. She also reports continued weakness. She denies chills, HA, dizziness, CP, palpitations, SOB, abdominal pain, vaginal bleeding, leakage of stool per vagina, abnormal vaginal discharge. She states she was not ambulating daily at [facility] however was working with PT there.    OB/GYN Hx: G0, stage 4 endometrial cancer dx in 7/2018; h/o breast cancer in 2009 s/p chemo and radiation with left breast lumpectomy  PMHx: T2DM, polio in childhood, h/o breast cancer  SHx: left breast lumpectomy, right knee surgery with screw placement 2001, 7/2018 exploratory laparotomy, enterolysis, SHAMIR, BSO, pelvic lymphadenectomy, low anterior resection mobilization of splenic flexure, end colostomy   Allergies: NKDA

## 2019-04-29 NOTE — ED PROVIDER NOTE - PROGRESS NOTE DETAILS
called to ER holding area for rapid response due to deteriorating condition.  pt being treated for sepsis and respiratory distress.  Anesthesia not available and asked to assist in securing airway.  Intubated w/o complications and OG tube placed.  See procedure notes.  Additional resuscitation managed by admitting and rapid response team.  Of note patient's initial 150mg succinylcholine did not work after 5 minutes an additional 100mg given and worked.  ? whether line was working.

## 2019-04-29 NOTE — H&P ADULT - ASSESSMENT
58 yo G0 w/ known stage IV endometrial carcinoma s/p staging surgery 7/2018 and 1 cycle of chemotherapy in 2/2019 followed by multiple admissions for management of symptomatic enterovaginal fistula, complex fistula associated urinary tract infection, bacteremia presents on referral from [facility] w/ sepsis, suspected source is urine 58 yo G0 w/ known stage IV endometrial carcinoma s/p staging surgery 7/2018 and 1 cycle of chemotherapy in 2/2019 followed by multiple admissions for management of symptomatic enterovaginal fistula, complex fistula associated urinary tract infection, bacteremia presents on referral from [facility] w/ sepsis, suspected source is urine  - admit to telemetry  Neuro: no pain  CV: NS @ 111cc/hr  Pulm: oxygen via nasal cannula  GI: NPO until CT C/A/P performed  : joseph in place, UA +nitrates, adequate output, zinc oxide for irritation  ID: etapenem 1g q24hrs, vancomycin 1g BID, needs isolation  DVT ppx: SCDs, lovenox 40mg qd  Onc: awaiting CT to evaluate for progression of disease, continue megace 80mg BID until 5/1 then switch to tamixofen 20mg BID for 3 weeks

## 2019-04-29 NOTE — ED PROVIDER NOTE - PHYSICAL EXAMINATION
VITAL SIGNS: I have reviewed nursing notes and confirm.  CONSTITUTIONAL: + chronically ill appearing female lying calmly in stretcher A&Ox3 speaking clearly in complete sentences; in no acute distress.  SKIN: Agree with RN documentation regarding decubitus evaluation. Remainder of skin exam is warm and dry, no acute rash.  HEAD: Normocephalic; atraumatic.  EYES: PERRL, EOM intact; conjunctiva and sclera clear.  ENT: No nasal discharge; airway clear.  NECK: Supple; non tender.  CARD: S1, S2 normal; no murmurs, gallops, or rubs. +tachycardic regular rhythm  RESP: + coarse lung sounds at bases b/l, moderate excursion, no tachypnea  ABD: Normal bowel sounds; soft; ND, + mildly globally TTP w/out rigidity/guarding, + ostomy LLQ expelling brown stool contents  EXT: Normal ROM. No clubbing, cyanosis or edema.  NEURO: Alert, oriented. Grossly unremarkable.  PSYCH: Cooperative, appropriate.

## 2019-04-29 NOTE — H&P ADULT - NSHPLABSRESULTS_GEN_ALL_CORE
9.4    18.95 )-----------( 220      ( 2019 21:54 )             30.5       131<L>  |  95<L>  |  42<H>  ----------------------------<  193<H>  4.3   |  23  |  1.03    Ca    9.6      2019 21:54  Mg     1.6         TPro  7.9  /  Alb  3.1<L>  /  TBili  0.2  /  DBili  x   /  AST  18  /  ALT  14  /  AlkPhos  114    PT/INR - ( 2019 21:54 )   PT: 12.8 sec;   INR: 1.13          PTT - ( 2019 21:54 )  PTT:27.3 sec    Urinalysis Basic - ( 2019 22:03 )    Color: Yellow / Appearance: Clear / S.020 / pH: x  Gluc: x / Ketone: NEGATIVE  / Bili: Negative / Urobili: 0.2 E.U./dL   Blood: x / Protein: 100 mg/dL / Nitrite: POSITIVE   Leuk Esterase: Small / RBC: < 5 /HPF / WBC Many /HPF   Sq Epi: x / Non Sq Epi: 5-10 /HPF / Bacteria: Many /HPF

## 2019-04-29 NOTE — ED ADULT NURSE NOTE - NSIMPLEMENTINTERV_GEN_ALL_ED
Implemented All Fall Risk Interventions:  [place] to call system. Call bell, personal items and telephone within reach. Instruct patient to call for assistance. Room bathroom lighting operational. Non-slip footwear when patient is off stretcher. Physically safe environment: no spills, clutter or unnecessary equipment. Stretcher in lowest position, wheels locked, appropriate side rails in place. Provide visual cue, wrist band, yellow gown, etc. Monitor gait and stability. Monitor for mental status changes and reorient to person, place, and time. Review medications for side effects contributing to fall risk. Reinforce activity limits and safety measures with patient and family.

## 2019-04-29 NOTE — ED PROVIDER NOTE - CLINICAL SUMMARY MEDICAL DECISION MAKING FREE TEXT BOX
Abd pain, n/v, SOB, concerned for aspiration PNA vs primary abd source of infection. + multi-drug resistant organisms in the past, covered with vanc/dunia, placed on isolation, seen and evaluated by GYN Onc team, accepted to their service under telemetry. Pain controlled, HDS and comfortable in ED.

## 2019-04-29 NOTE — H&P ADULT - NSHPPHYSICALEXAM_GEN_ALL_CORE
Vital Signs Last 24 Hrs  T(C): 37.2 (29 Apr 2019 20:59), Max: 37.2 (29 Apr 2019 20:59)  T(F): 99 (29 Apr 2019 20:59), Max: 99 (29 Apr 2019 20:59)  HR: 110 (29 Apr 2019 20:59) (110 - 110)  BP: 114/68 (29 Apr 2019 20:59) (114/68 - 114/68)  BP(mean): --  RR: 18 (29 Apr 2019 20:59) (18 - 18)  SpO2: 96% (29 Apr 2019 20:59) (96% - 96%)  GA: NAD, A+OX3  CV: tachycardia, no MRG  Pulm: coarse sounds ausculated at lung bases  Abd: soft, absent bowel sounds, moderately distended, ostomy in LLQ with thickened brown stool  : mild erythema in inguinal area and surrounding labia, no stool per vagina, no abnormal discharge  Extrem: no calf tenderness, 4/5 strength in all extremities

## 2019-04-29 NOTE — ED PROVIDER NOTE - CARE PLAN
Principal Discharge DX:	Sepsis, due to unspecified organism  Secondary Diagnosis:	Malignant neoplasm  Secondary Diagnosis:	Breast CA

## 2019-04-29 NOTE — ED ADULT NURSE NOTE - PMH
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

## 2019-04-29 NOTE — H&P ADULT - NSICDXPASTSURGICALHX_GEN_ALL_CORE_FT
PAST SURGICAL HISTORY:  History of total abdominal hysterectomy and bilateral salpingo-oophorectomy with resection of endometrial mass, low anterior resection and pelvic lymphadenectomy

## 2019-04-29 NOTE — ED ADULT NURSE REASSESSMENT NOTE - NS ED NURSE REASSESS COMMENT FT1
pt with FC [id number] connect to leg bag draining well about 800ml output  and with colostomy bag from nursing home.

## 2019-04-30 LAB
ALBUMIN SERPL ELPH-MCNC: 2.4 G/DL — LOW (ref 3.3–5)
ALBUMIN SERPL ELPH-MCNC: 2.4 G/DL — LOW (ref 3.3–5)
ALP SERPL-CCNC: 131 U/L — HIGH (ref 40–120)
ALP SERPL-CCNC: 147 U/L — HIGH (ref 40–120)
ALT FLD-CCNC: 36 U/L — SIGNIFICANT CHANGE UP (ref 10–45)
ALT FLD-CCNC: 36 U/L — SIGNIFICANT CHANGE UP (ref 10–45)
ANION GAP SERPL CALC-SCNC: 11 MMOL/L — SIGNIFICANT CHANGE UP (ref 5–17)
ANION GAP SERPL CALC-SCNC: 11 MMOL/L — SIGNIFICANT CHANGE UP (ref 5–17)
AST SERPL-CCNC: 37 U/L — SIGNIFICANT CHANGE UP (ref 10–40)
AST SERPL-CCNC: 43 U/L — HIGH (ref 10–40)
BASE EXCESS BLDA CALC-SCNC: -7.8 MMOL/L — LOW (ref -2–3)
BASOPHILS # BLD AUTO: 0 K/UL — SIGNIFICANT CHANGE UP (ref 0–0.2)
BASOPHILS NFR BLD AUTO: 0 % — SIGNIFICANT CHANGE UP (ref 0–2)
BILIRUB DIRECT SERPL-MCNC: <0.2 MG/DL — SIGNIFICANT CHANGE UP (ref 0–0.2)
BILIRUB INDIRECT FLD-MCNC: SIGNIFICANT CHANGE UP MG/DL (ref 0.2–1)
BILIRUB SERPL-MCNC: <0.2 MG/DL — SIGNIFICANT CHANGE UP (ref 0.2–1.2)
BILIRUB SERPL-MCNC: <0.2 MG/DL — SIGNIFICANT CHANGE UP (ref 0.2–1.2)
BUN SERPL-MCNC: 41 MG/DL — HIGH (ref 7–23)
BUN SERPL-MCNC: 43 MG/DL — HIGH (ref 7–23)
CALCIUM SERPL-MCNC: 9 MG/DL — SIGNIFICANT CHANGE UP (ref 8.4–10.5)
CALCIUM SERPL-MCNC: 9 MG/DL — SIGNIFICANT CHANGE UP (ref 8.4–10.5)
CHLORIDE SERPL-SCNC: 100 MMOL/L — SIGNIFICANT CHANGE UP (ref 96–108)
CHLORIDE SERPL-SCNC: 101 MMOL/L — SIGNIFICANT CHANGE UP (ref 96–108)
CO2 SERPL-SCNC: 21 MMOL/L — LOW (ref 22–31)
CO2 SERPL-SCNC: 22 MMOL/L — SIGNIFICANT CHANGE UP (ref 22–31)
CREAT SERPL-MCNC: 0.94 MG/DL — SIGNIFICANT CHANGE UP (ref 0.5–1.3)
CREAT SERPL-MCNC: 1.01 MG/DL — SIGNIFICANT CHANGE UP (ref 0.5–1.3)
EOSINOPHIL # BLD AUTO: 0.34 K/UL — SIGNIFICANT CHANGE UP (ref 0–0.5)
EOSINOPHIL NFR BLD AUTO: 2 % — SIGNIFICANT CHANGE UP (ref 0–6)
EXTRA BLUE TOP TUBE: SIGNIFICANT CHANGE UP
EXTRA SST TUBE: SIGNIFICANT CHANGE UP
GLUCOSE BLDC GLUCOMTR-MCNC: 125 MG/DL — HIGH (ref 70–99)
GLUCOSE BLDC GLUCOMTR-MCNC: 159 MG/DL — HIGH (ref 70–99)
GLUCOSE BLDC GLUCOMTR-MCNC: 171 MG/DL — HIGH (ref 70–99)
GLUCOSE BLDC GLUCOMTR-MCNC: 175 MG/DL — HIGH (ref 70–99)
GLUCOSE BLDC GLUCOMTR-MCNC: 222 MG/DL — HIGH (ref 70–99)
GLUCOSE SERPL-MCNC: 166 MG/DL — HIGH (ref 70–99)
GLUCOSE SERPL-MCNC: 230 MG/DL — HIGH (ref 70–99)
GRAM STN FLD: SIGNIFICANT CHANGE UP
GRAM STN FLD: SIGNIFICANT CHANGE UP
HCO3 BLDA-SCNC: 19 MMOL/L — LOW (ref 21–28)
HCT VFR BLD CALC: 32.2 % — LOW (ref 34.5–45)
HCT VFR BLD CALC: 34 % — LOW (ref 34.5–45)
HGB BLD-MCNC: 9.5 G/DL — LOW (ref 11.5–15.5)
HGB BLD-MCNC: 9.9 G/DL — LOW (ref 11.5–15.5)
LACTATE SERPL-SCNC: 1.1 MMOL/L — SIGNIFICANT CHANGE UP (ref 0.5–2)
LYMPHOCYTES # BLD AUTO: 1.2 K/UL — SIGNIFICANT CHANGE UP (ref 1–3.3)
LYMPHOCYTES # BLD AUTO: 7 % — LOW (ref 13–44)
MAGNESIUM SERPL-MCNC: 1.7 MG/DL — SIGNIFICANT CHANGE UP (ref 1.6–2.6)
MAGNESIUM SERPL-MCNC: 1.8 MG/DL — SIGNIFICANT CHANGE UP (ref 1.6–2.6)
MCHC RBC-ENTMCNC: 29.1 GM/DL — LOW (ref 32–36)
MCHC RBC-ENTMCNC: 29.4 PG — SIGNIFICANT CHANGE UP (ref 27–34)
MCHC RBC-ENTMCNC: 29.5 GM/DL — LOW (ref 32–36)
MCHC RBC-ENTMCNC: 29.6 PG — SIGNIFICANT CHANGE UP (ref 27–34)
MCV RBC AUTO: 100.3 FL — HIGH (ref 80–100)
MCV RBC AUTO: 100.9 FL — HIGH (ref 80–100)
MONOCYTES # BLD AUTO: 1.2 K/UL — HIGH (ref 0–0.9)
MONOCYTES NFR BLD AUTO: 7 % — SIGNIFICANT CHANGE UP (ref 2–14)
NEUTROPHILS # BLD AUTO: 14.11 K/UL — HIGH (ref 1.8–7.4)
NEUTROPHILS NFR BLD AUTO: 75 % — SIGNIFICANT CHANGE UP (ref 43–77)
NRBC # BLD: 0 /100 WBCS — SIGNIFICANT CHANGE UP (ref 0–0)
NRBC # BLD: SIGNIFICANT CHANGE UP /100 WBCS (ref 0–0)
PCO2 BLDA: 45 MMHG — SIGNIFICANT CHANGE UP (ref 32–45)
PH BLDA: 7.25 — LOW (ref 7.35–7.45)
PHOSPHATE SERPL-MCNC: 4.2 MG/DL — SIGNIFICANT CHANGE UP (ref 2.5–4.5)
PHOSPHATE SERPL-MCNC: 4.2 MG/DL — SIGNIFICANT CHANGE UP (ref 2.5–4.5)
PLATELET # BLD AUTO: 191 K/UL — SIGNIFICANT CHANGE UP (ref 150–400)
PLATELET # BLD AUTO: 206 K/UL — SIGNIFICANT CHANGE UP (ref 150–400)
PO2 BLDA: 162 MMHG — HIGH (ref 83–108)
POTASSIUM SERPL-MCNC: 4.4 MMOL/L — SIGNIFICANT CHANGE UP (ref 3.5–5.3)
POTASSIUM SERPL-MCNC: 4.9 MMOL/L — SIGNIFICANT CHANGE UP (ref 3.5–5.3)
POTASSIUM SERPL-SCNC: 4.4 MMOL/L — SIGNIFICANT CHANGE UP (ref 3.5–5.3)
POTASSIUM SERPL-SCNC: 4.9 MMOL/L — SIGNIFICANT CHANGE UP (ref 3.5–5.3)
PROT SERPL-MCNC: 7.4 G/DL — SIGNIFICANT CHANGE UP (ref 6–8.3)
PROT SERPL-MCNC: 7.5 G/DL — SIGNIFICANT CHANGE UP (ref 6–8.3)
RBC # BLD: 3.21 M/UL — LOW (ref 3.8–5.2)
RBC # BLD: 3.37 M/UL — LOW (ref 3.8–5.2)
RBC # FLD: 14.8 % — HIGH (ref 10.3–14.5)
RBC # FLD: 15.4 % — HIGH (ref 10.3–14.5)
SAO2 % BLDA: 100 % — SIGNIFICANT CHANGE UP (ref 95–100)
SODIUM SERPL-SCNC: 132 MMOL/L — LOW (ref 135–145)
SODIUM SERPL-SCNC: 134 MMOL/L — LOW (ref 135–145)
SPECIMEN SOURCE: SIGNIFICANT CHANGE UP
SPECIMEN SOURCE: SIGNIFICANT CHANGE UP
WBC # BLD: 17.21 K/UL — HIGH (ref 3.8–10.5)
WBC # BLD: 17.66 K/UL — HIGH (ref 3.8–10.5)
WBC # FLD AUTO: 17.21 K/UL — HIGH (ref 3.8–10.5)
WBC # FLD AUTO: 17.66 K/UL — HIGH (ref 3.8–10.5)

## 2019-04-30 PROCEDURE — 99291 CRITICAL CARE FIRST HOUR: CPT

## 2019-04-30 PROCEDURE — 74177 CT ABD & PELVIS W/CONTRAST: CPT | Mod: 26

## 2019-04-30 PROCEDURE — 99223 1ST HOSP IP/OBS HIGH 75: CPT | Mod: GC

## 2019-04-30 PROCEDURE — 71260 CT THORAX DX C+: CPT | Mod: 26

## 2019-04-30 PROCEDURE — 71045 X-RAY EXAM CHEST 1 VIEW: CPT | Mod: 26

## 2019-04-30 PROCEDURE — 70450 CT HEAD/BRAIN W/O DYE: CPT | Mod: 26

## 2019-04-30 PROCEDURE — 99233 SBSQ HOSP IP/OBS HIGH 50: CPT

## 2019-04-30 PROCEDURE — 74018 RADEX ABDOMEN 1 VIEW: CPT | Mod: 26

## 2019-04-30 RX ORDER — CHLORHEXIDINE GLUCONATE 213 G/1000ML
1 SOLUTION TOPICAL DAILY
Qty: 0 | Refills: 0 | Status: DISCONTINUED | OUTPATIENT
Start: 2019-04-30 | End: 2019-05-03

## 2019-04-30 RX ORDER — AMIKACIN SULFATE 250 MG/ML
400 INJECTION, SOLUTION INTRAMUSCULAR; INTRAVENOUS ONCE
Qty: 0 | Refills: 0 | Status: COMPLETED | OUTPATIENT
Start: 2019-04-30 | End: 2019-04-30

## 2019-04-30 RX ORDER — DEXTROSE 50 % IN WATER 50 %
25 SYRINGE (ML) INTRAVENOUS ONCE
Qty: 0 | Refills: 0 | Status: DISCONTINUED | OUTPATIENT
Start: 2019-04-30 | End: 2019-06-19

## 2019-04-30 RX ORDER — MEROPENEM 1 G/30ML
1000 INJECTION INTRAVENOUS ONCE
Qty: 0 | Refills: 0 | Status: COMPLETED | OUTPATIENT
Start: 2019-04-30 | End: 2019-04-30

## 2019-04-30 RX ORDER — MEROPENEM 1 G/30ML
1000 INJECTION INTRAVENOUS EVERY 8 HOURS
Qty: 0 | Refills: 0 | Status: DISCONTINUED | OUTPATIENT
Start: 2019-04-30 | End: 2019-05-02

## 2019-04-30 RX ORDER — ACETAMINOPHEN 500 MG
1000 TABLET ORAL ONCE
Qty: 0 | Refills: 0 | Status: DISCONTINUED | OUTPATIENT
Start: 2019-04-30 | End: 2019-04-30

## 2019-04-30 RX ORDER — CHLORHEXIDINE GLUCONATE 213 G/1000ML
1 SOLUTION TOPICAL
Qty: 0 | Refills: 0 | Status: DISCONTINUED | OUTPATIENT
Start: 2019-04-30 | End: 2019-05-01

## 2019-04-30 RX ORDER — FENTANYL CITRATE 50 UG/ML
100 INJECTION INTRAVENOUS ONCE
Qty: 0 | Refills: 0 | Status: DISCONTINUED | OUTPATIENT
Start: 2019-04-30 | End: 2019-04-30

## 2019-04-30 RX ORDER — ERTAPENEM SODIUM 1 G/1
1000 INJECTION, POWDER, LYOPHILIZED, FOR SOLUTION INTRAMUSCULAR; INTRAVENOUS EVERY 24 HOURS
Qty: 0 | Refills: 0 | Status: DISCONTINUED | OUTPATIENT
Start: 2019-04-30 | End: 2019-04-30

## 2019-04-30 RX ORDER — MEGESTROL ACETATE 40 MG/ML
80 SUSPENSION ORAL EVERY 12 HOURS
Qty: 0 | Refills: 0 | Status: DISCONTINUED | OUTPATIENT
Start: 2019-04-30 | End: 2019-04-30

## 2019-04-30 RX ORDER — NOREPINEPHRINE BITARTRATE/D5W 8 MG/250ML
0.05 PLASTIC BAG, INJECTION (ML) INTRAVENOUS
Qty: 8 | Refills: 0 | Status: DISCONTINUED | OUTPATIENT
Start: 2019-04-30 | End: 2019-05-02

## 2019-04-30 RX ORDER — INSULIN LISPRO 100/ML
VIAL (ML) SUBCUTANEOUS
Qty: 0 | Refills: 0 | Status: DISCONTINUED | OUTPATIENT
Start: 2019-04-30 | End: 2019-05-01

## 2019-04-30 RX ORDER — GLUCAGON INJECTION, SOLUTION 0.5 MG/.1ML
1 INJECTION, SOLUTION SUBCUTANEOUS ONCE
Qty: 0 | Refills: 0 | Status: DISCONTINUED | OUTPATIENT
Start: 2019-04-30 | End: 2019-06-19

## 2019-04-30 RX ORDER — SUCCINYLCHOLINE CHLORIDE 100 MG/5ML
100 SYRINGE (ML) INTRAVENOUS ONCE
Qty: 0 | Refills: 0 | Status: COMPLETED | OUTPATIENT
Start: 2019-04-30 | End: 2019-04-30

## 2019-04-30 RX ORDER — ERTAPENEM SODIUM 1 G/1
1000 INJECTION, POWDER, LYOPHILIZED, FOR SOLUTION INTRAMUSCULAR; INTRAVENOUS ONCE
Qty: 0 | Refills: 0 | Status: COMPLETED | OUTPATIENT
Start: 2019-04-30 | End: 2019-04-30

## 2019-04-30 RX ORDER — VANCOMYCIN HCL 1 G
1000 VIAL (EA) INTRAVENOUS EVERY 12 HOURS
Qty: 0 | Refills: 0 | Status: DISCONTINUED | OUTPATIENT
Start: 2019-04-30 | End: 2019-04-30

## 2019-04-30 RX ORDER — METFORMIN HYDROCHLORIDE 850 MG/1
1000 TABLET ORAL EVERY 12 HOURS
Qty: 0 | Refills: 0 | Status: DISCONTINUED | OUTPATIENT
Start: 2019-04-30 | End: 2019-04-30

## 2019-04-30 RX ORDER — SUCCINYLCHOLINE CHLORIDE 100 MG/5ML
50 SYRINGE (ML) INTRAVENOUS ONCE
Qty: 0 | Refills: 0 | Status: COMPLETED | OUTPATIENT
Start: 2019-04-30 | End: 2019-04-30

## 2019-04-30 RX ORDER — SODIUM CHLORIDE 9 MG/ML
1000 INJECTION, SOLUTION INTRAVENOUS
Qty: 0 | Refills: 0 | Status: DISCONTINUED | OUTPATIENT
Start: 2019-04-30 | End: 2019-06-19

## 2019-04-30 RX ORDER — DEXTROSE 50 % IN WATER 50 %
12.5 SYRINGE (ML) INTRAVENOUS ONCE
Qty: 0 | Refills: 0 | Status: DISCONTINUED | OUTPATIENT
Start: 2019-04-30 | End: 2019-05-06

## 2019-04-30 RX ORDER — VASOPRESSIN 20 [USP'U]/ML
0.04 INJECTION INTRAVENOUS
Qty: 50 | Refills: 0 | Status: DISCONTINUED | OUTPATIENT
Start: 2019-04-30 | End: 2019-04-30

## 2019-04-30 RX ORDER — ETOMIDATE 2 MG/ML
10 INJECTION INTRAVENOUS ONCE
Qty: 0 | Refills: 0 | Status: COMPLETED | OUTPATIENT
Start: 2019-04-30 | End: 2019-04-30

## 2019-04-30 RX ORDER — ENOXAPARIN SODIUM 100 MG/ML
40 INJECTION SUBCUTANEOUS EVERY 24 HOURS
Qty: 0 | Refills: 0 | Status: DISCONTINUED | OUTPATIENT
Start: 2019-04-30 | End: 2019-05-17

## 2019-04-30 RX ORDER — DEXTROSE 50 % IN WATER 50 %
15 SYRINGE (ML) INTRAVENOUS ONCE
Qty: 0 | Refills: 0 | Status: DISCONTINUED | OUTPATIENT
Start: 2019-04-30 | End: 2019-05-06

## 2019-04-30 RX ORDER — DEXTROSE 50 % IN WATER 50 %
25 SYRINGE (ML) INTRAVENOUS ONCE
Qty: 0 | Refills: 0 | Status: DISCONTINUED | OUTPATIENT
Start: 2019-04-30 | End: 2019-05-06

## 2019-04-30 RX ORDER — ACETAMINOPHEN 500 MG
1000 TABLET ORAL ONCE
Qty: 0 | Refills: 0 | Status: COMPLETED | OUTPATIENT
Start: 2019-04-30 | End: 2019-04-30

## 2019-04-30 RX ORDER — PROPOFOL 10 MG/ML
5 INJECTION, EMULSION INTRAVENOUS
Qty: 1000 | Refills: 0 | Status: DISCONTINUED | OUTPATIENT
Start: 2019-04-30 | End: 2019-05-02

## 2019-04-30 RX ORDER — MORPHINE SULFATE 50 MG/1
2 CAPSULE, EXTENDED RELEASE ORAL EVERY 4 HOURS
Qty: 0 | Refills: 0 | Status: DISCONTINUED | OUTPATIENT
Start: 2019-04-30 | End: 2019-05-04

## 2019-04-30 RX ORDER — MEROPENEM 1 G/30ML
INJECTION INTRAVENOUS
Qty: 0 | Refills: 0 | Status: DISCONTINUED | OUTPATIENT
Start: 2019-04-30 | End: 2019-05-02

## 2019-04-30 RX ADMIN — Medication 1000 MILLIGRAM(S): at 12:10

## 2019-04-30 RX ADMIN — SODIUM CHLORIDE 111 MILLILITER(S): 9 INJECTION INTRAMUSCULAR; INTRAVENOUS; SUBCUTANEOUS at 10:22

## 2019-04-30 RX ADMIN — PROPOFOL 1.6 MICROGRAM(S)/KG/MIN: 10 INJECTION, EMULSION INTRAVENOUS at 13:28

## 2019-04-30 RX ADMIN — MORPHINE SULFATE 2 MILLIGRAM(S): 50 CAPSULE, EXTENDED RELEASE ORAL at 07:53

## 2019-04-30 RX ADMIN — Medication 100 MILLIGRAM(S): at 12:45

## 2019-04-30 RX ADMIN — MEROPENEM 100 MILLIGRAM(S): 1 INJECTION INTRAVENOUS at 13:09

## 2019-04-30 RX ADMIN — MEGESTROL ACETATE 80 MILLIGRAM(S): 40 SUSPENSION ORAL at 10:48

## 2019-04-30 RX ADMIN — ZINC OXIDE 1 APPLICATION(S): 200 OINTMENT TOPICAL at 06:51

## 2019-04-30 RX ADMIN — Medication 5.02 MICROGRAM(S)/KG/MIN: at 21:14

## 2019-04-30 RX ADMIN — ZINC OXIDE 1 APPLICATION(S): 200 OINTMENT TOPICAL at 23:03

## 2019-04-30 RX ADMIN — Medication 1 APPLICATION(S): at 23:06

## 2019-04-30 RX ADMIN — Medication 50 MILLIGRAM(S): at 12:30

## 2019-04-30 RX ADMIN — METFORMIN HYDROCHLORIDE 1000 MILLIGRAM(S): 850 TABLET ORAL at 10:36

## 2019-04-30 RX ADMIN — Medication 2: at 19:09

## 2019-04-30 RX ADMIN — CHLORHEXIDINE GLUCONATE 1 APPLICATION(S): 213 SOLUTION TOPICAL at 21:12

## 2019-04-30 RX ADMIN — MEROPENEM 100 MILLIGRAM(S): 1 INJECTION INTRAVENOUS at 22:33

## 2019-04-30 RX ADMIN — AMIKACIN SULFATE 101.6 MILLIGRAM(S): 250 INJECTION, SOLUTION INTRAMUSCULAR; INTRAVENOUS at 14:16

## 2019-04-30 RX ADMIN — ERTAPENEM SODIUM 120 MILLIGRAM(S): 1 INJECTION, POWDER, LYOPHILIZED, FOR SOLUTION INTRAMUSCULAR; INTRAVENOUS at 10:35

## 2019-04-30 RX ADMIN — FENTANYL CITRATE 100 MICROGRAM(S): 50 INJECTION INTRAVENOUS at 12:30

## 2019-04-30 RX ADMIN — Medication 2: at 06:52

## 2019-04-30 RX ADMIN — Medication 400 MILLIGRAM(S): at 12:10

## 2019-04-30 RX ADMIN — Medication 400 MILLIGRAM(S): at 01:32

## 2019-04-30 RX ADMIN — Medication 250 MILLIGRAM(S): at 10:46

## 2019-04-30 RX ADMIN — ETOMIDATE 10 MILLIGRAM(S): 2 INJECTION INTRAVENOUS at 12:30

## 2019-04-30 RX ADMIN — PANTOPRAZOLE SODIUM 40 MILLIGRAM(S): 20 TABLET, DELAYED RELEASE ORAL at 06:51

## 2019-04-30 RX ADMIN — MORPHINE SULFATE 2 MILLIGRAM(S): 50 CAPSULE, EXTENDED RELEASE ORAL at 08:55

## 2019-04-30 RX ADMIN — Medication 5.02 MICROGRAM(S)/KG/MIN: at 13:27

## 2019-04-30 RX ADMIN — ENOXAPARIN SODIUM 40 MILLIGRAM(S): 100 INJECTION SUBCUTANEOUS at 01:24

## 2019-04-30 RX ADMIN — SODIUM CHLORIDE 111 MILLILITER(S): 9 INJECTION INTRAMUSCULAR; INTRAVENOUS; SUBCUTANEOUS at 23:01

## 2019-04-30 NOTE — CONSULT NOTE ADULT - SUBJECTIVE AND OBJECTIVE BOX
ID CONSULT NOTE    CHIEF COMPLAINT: Patient is a 59y old  Female who presents with a chief complaint of sepsis (2019 14:21)    HPI:  59 F w/ Stage IV Endometrial Carcinoma s/p staging surgery 2018 and 1 cycle of chemo in 2019, DM, low anterior resection mobilization of splenic flexure, end colostomy w/ rectovaginal fistula w/ numerous admissions for urinary tract infection and bacteremia who presents from [facility] with fever and n/v. 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    MEDICATIONS  (STANDING):  chlorhexidine 2% Cloths 1 Application(s) Topical daily  chlorhexidine 4% Liquid 1 Application(s) Topical <User Schedule>  dextrose 5%. 1000 milliLiter(s) (50 mL/Hr) IV Continuous <Continuous>  dextrose 50% Injectable 12.5 Gram(s) IV Push once  dextrose 50% Injectable 25 Gram(s) IV Push once  dextrose 50% Injectable 25 Gram(s) IV Push once  enoxaparin Injectable 40 milliGRAM(s) SubCutaneous every 24 hours  insulin lispro (HumaLOG) corrective regimen sliding scale   SubCutaneous Before meals and at bedtime  megestrol 80 milliGRAM(s) Oral every 12 hours  meropenem  IVPB 1000 milliGRAM(s) IV Intermittent every 8 hours  meropenem  IVPB      norepinephrine Infusion 0.05 MICROgram(s)/kG/Min (5.016 mL/Hr) IV Continuous <Continuous>  pantoprazole  Injectable 40 milliGRAM(s) IV Push every 24 hours  petrolatum Ophthalmic Ointment 1 Application(s) Both EYES three times a day  propofol Infusion 5 MICROgram(s)/kG/Min (1.605 mL/Hr) IV Continuous <Continuous>  sodium chloride 0.9%. 1000 milliLiter(s) (111 mL/Hr) IV Continuous <Continuous>  vancomycin  IVPB 1000 milliGRAM(s) IV Intermittent every 12 hours  zinc oxide 20% Ointment 1 Application(s) Topical three times a day    MEDICATIONS  (PRN):  dextrose 40% Gel 15 Gram(s) Oral once PRN Blood Glucose LESS THAN 70 milliGRAM(s)/deciliter  glucagon  Injectable 1 milliGRAM(s) IntraMuscular once PRN Glucose LESS THAN 70 milligrams/deciliter  morphine  - Injectable 2 milliGRAM(s) IV Push every 4 hours PRN Severe Pain (7 - 10)  ondansetron Injectable 8 milliGRAM(s) IV Push every 8 hours PRN Nausea and/or Vomiting      Allergies    No Known Allergies    Intolerances        FAMILY HISTORY:      SOCIAL HISTORY:     Vital Signs Last 24 Hrs  T(C): 37.8 (2019 13:45), Max: 38.2 (2019 21:18)  T(F): 100.1 (2019 13:45), Max: 100.8 (2019 21:18)  HR: 92 (2019 15:34) (92 - 131)  BP: 151/81 (2019 15:34) (86/54 - 151/81)  BP(mean): --  RR: 17 (2019 15:34) (14 - 25)  SpO2: 100% (2019 15:34) (94% - 100%)    PHYSICAL EXAM:  GEN: alert, NAD, comfortable in bed  HEENT: PERRL, no relative afferent pupillary defect, EOMI, moist MM, no pharyngeal erythema or exudate  CV: RRR, S1/S2, no murmurs appreciated, no JVD, no carotid bruits  RESP: CTA bilaterally, good respiratory effort, good air movement, no wheezes/rales  ABD: soft, BS+, nontender, nondistended, no guarding/rebound  EXTREMITIES: WWP, pulses 2+ in all 4 extremities, no peripheral edema  MSK: full range of motion of all 4 extremities  SKIN: warm, dry, intact, no rashes  NEURO: A&Ox3, no focal deficits, strength 5/5 throughout, no sensory deficits  PSYC: normal mood/affect    LABS: reviewed.    CAPILLARY BLOOD GLUCOSE      POCT Blood Glucose.: 222 mg/dL (2019 12:23)  POCT Blood Glucose.: 171 mg/dL (2019 11:15)  POCT Blood Glucose.: 159 mg/dL (2019 06:44)                          9.9    17.21 )-----------( 191      ( 2019 12:57 )             34.0     04-30    132<L>  |  100  |  41<H>  ----------------------------<  230<H>  4.9   |  21<L>  |  0.94    Ca    9.0      2019 12:57  Phos  4.2     04-30  Mg     1.8     04-30    TPro  7.4  /  Alb  2.4<L>  /  TBili  <0.2  /  DBili  <0.2  /  AST  37  /  ALT  36  /  AlkPhos  131<H>  -30    PT/INR - ( 2019 21:54 )   PT: 12.8 sec;   INR: 1.13          PTT - ( 2019 21:54 )  PTT:27.3 sec  LIVER FUNCTIONS - ( 2019 12:57 )  Alb: 2.4 g/dL / Pro: 7.4 g/dL / ALK PHOS: 131 U/L / ALT: 36 U/L / AST: 37 U/L / GGT: x           ABG - ( 2019 14:15 )  pH, Arterial: 7.25  pH, Blood: x     /  pCO2: 45    /  pO2: 162   / HCO3: 19    / Base Excess: -7.8  /  SaO2: 100               Urinalysis Basic - ( 2019 22:03 )    Color: Yellow / Appearance: Clear / S.020 / pH: x  Gluc: x / Ketone: NEGATIVE  / Bili: Negative / Urobili: 0.2 E.U./dL   Blood: x / Protein: 100 mg/dL / Nitrite: POSITIVE   Leuk Esterase: Small / RBC: < 5 /HPF / WBC Many /HPF   Sq Epi: x / Non Sq Epi: 5-10 /HPF / Bacteria: Many /HPF      CARDIAC MARKERS ( 2019 21:54 )  x     / <0.01 ng/mL / x     / x     / x              RADIOLOGY AND ADDITIONAL TESTS: reviewed.    Chest XR:  EKG:  Echocardiogram: ID CONSULT NOTE    CHIEF COMPLAINT: Patient is a 59y old  Female who presents with a chief complaint of sepsis (2019 14:21)    HPI:  59 F w/ Stage IV Endometrial Carcinoma s/p staging surgery 2018 and 1 cycle of chemo in 2019, DM, low anterior resection mobilization of splenic flexure, end colostomy w/ rectovaginal fistula (reported to be healed) w/ numerous admissions for urinary tract infection and bacteremia who presents from [facility] with fever and n/v. The patient reports that for a few days before admission she was feeling generally unwell and nauseous. She was given Maalox and Pepto-Bismol which did not improve her symptoms. The next day she vomited NBNB vomitus and spiked a fever to 100.7F at her L[facility] and she was then brought to the ED. The patient additionally reports a dry cough which she states is chronic. In the ED, she was found to have a positive UA and sepsis with CT findings suggestive of pyelonephritis. She was started on Meropenem and Vancomycin and was admitted to gynecology service. While in the ED, the patient was noted to be tachycardic and hypotensive with an acute change in mental status and was subsequently intubated and started on levophed. ID was consulted to assist with management of antibiotics as the patient has a history of MRSA (cultured from PICC line) and ESBL Proteus bacteremia (from UTI) on previous admissions. Of note, patient has a neurogenic bladder for which she has a chronic indwelling joseph. 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    MEDICATIONS  (STANDING):  chlorhexidine 2% Cloths 1 Application(s) Topical daily  chlorhexidine 4% Liquid 1 Application(s) Topical <User Schedule>  dextrose 5%. 1000 milliLiter(s) (50 mL/Hr) IV Continuous <Continuous>  dextrose 50% Injectable 12.5 Gram(s) IV Push once  dextrose 50% Injectable 25 Gram(s) IV Push once  dextrose 50% Injectable 25 Gram(s) IV Push once  enoxaparin Injectable 40 milliGRAM(s) SubCutaneous every 24 hours  insulin lispro (HumaLOG) corrective regimen sliding scale   SubCutaneous Before meals and at bedtime  megestrol 80 milliGRAM(s) Oral every 12 hours  meropenem  IVPB 1000 milliGRAM(s) IV Intermittent every 8 hours  meropenem  IVPB      norepinephrine Infusion 0.05 MICROgram(s)/kG/Min (5.016 mL/Hr) IV Continuous <Continuous>  pantoprazole  Injectable 40 milliGRAM(s) IV Push every 24 hours  petrolatum Ophthalmic Ointment 1 Application(s) Both EYES three times a day  propofol Infusion 5 MICROgram(s)/kG/Min (1.605 mL/Hr) IV Continuous <Continuous>  sodium chloride 0.9%. 1000 milliLiter(s) (111 mL/Hr) IV Continuous <Continuous>  vancomycin  IVPB 1000 milliGRAM(s) IV Intermittent every 12 hours  zinc oxide 20% Ointment 1 Application(s) Topical three times a day    MEDICATIONS  (PRN):  dextrose 40% Gel 15 Gram(s) Oral once PRN Blood Glucose LESS THAN 70 milliGRAM(s)/deciliter  glucagon  Injectable 1 milliGRAM(s) IntraMuscular once PRN Glucose LESS THAN 70 milligrams/deciliter  morphine  - Injectable 2 milliGRAM(s) IV Push every 4 hours PRN Severe Pain (7 - 10)  ondansetron Injectable 8 milliGRAM(s) IV Push every 8 hours PRN Nausea and/or Vomiting      Allergies    No Known Allergies    Intolerances    Vital Signs Last 24 Hrs  T(C): 37.8 (2019 13:45), Max: 38.2 (2019 21:18)  T(F): 100.1 (2019 13:45), Max: 100.8 (2019 21:18)  HR: 92 (2019 15:34) (92 - 131)  BP: 151/81 (2019 15:34) (86/54 - 151/81)  BP(mean): --  RR: 17 (2019 15:34) (14 - 25)  SpO2: 100% (2019 15:34) (94% - 100%)    PHYSICAL EXAM:  GEN: alert, NAD, comfortable in bed  HEENT: PERRL, no relative afferent pupillary defect, EOMI, moist MM, no pharyngeal erythema or exudate  CV: RRR, S1/S2, no murmurs appreciated, no JVD, no carotid bruits  RESP: CTA bilaterally, good respiratory effort, good air movement, no wheezes/rales  ABD: soft, BS+, nontender, nondistended, no guarding/rebound  EXTREMITIES: WWP, pulses 2+ in all 4 extremities, no peripheral edema  MSK: full range of motion of all 4 extremities  SKIN: warm, dry, intact, no rashes  NEURO: A&Ox3, no focal deficits, strength 5/5 throughout, no sensory deficits  PSYC: normal mood/affect    LABS: reviewed.    CAPILLARY BLOOD GLUCOSE      POCT Blood Glucose.: 222 mg/dL (2019 12:23)  POCT Blood Glucose.: 171 mg/dL (2019 11:15)  POCT Blood Glucose.: 159 mg/dL (2019 06:44)                          9.9    17.21 )-----------( 191      ( 2019 12:57 )             34.0     04-30    132<L>  |  100  |  41<H>  ----------------------------<  230<H>  4.9   |  21<L>  |  0.94    Ca    9.0      2019 12:57  Phos  4.2     04-30  Mg     1.8     04-30    TPro  7.4  /  Alb  2.4<L>  /  TBili  <0.2  /  DBili  <0.2  /  AST  37  /  ALT  36  /  AlkPhos  131<H>  04-30    PT/INR - ( 2019 21:54 )   PT: 12.8 sec;   INR: 1.13          PTT - ( 2019 21:54 )  PTT:27.3 sec  LIVER FUNCTIONS - ( 2019 12:57 )  Alb: 2.4 g/dL / Pro: 7.4 g/dL / ALK PHOS: 131 U/L / ALT: 36 U/L / AST: 37 U/L / GGT: x           ABG - ( 2019 14:15 )  pH, Arterial: 7.25  pH, Blood: x     /  pCO2: 45    /  pO2: 162   / HCO3: 19    / Base Excess: -7.8  /  SaO2: 100       Urinalysis Basic - ( 2019 22:03 )  Color: Yellow / Appearance: Clear / S.020 / pH: x  Gluc: x / Ketone: NEGATIVE  / Bili: Negative / Urobili: 0.2 E.U./dL   Blood: x / Protein: 100 mg/dL / Nitrite: POSITIVE   Leuk Esterase: Small / RBC: < 5 /HPF / WBC Many /HPF   Sq Epi: x / Non Sq Epi: 5-10 /HPF / Bacteria: Many /HPF      CARDIAC MARKERS ( 2019 21:54 )  x     / <0.01 ng/mL / x     / x     / x        RADIOLOGY AND ADDITIONAL TESTS: reviewed.  < from: CT Abdomen and Pelvis w/ IV Cont (19 @ 00:55) >  FINDINGS:CT of the chest, abdomen and pelvis was performed with the   administration of intravenous contrast. Reconstructions were performed in   the sagittal and coronal planes. Comparison is made to prior studies   dated 2019 and 3/15/2019.    Evaluation of the chest demonstrates mild enlargement of the thyroid   gland. There is moderate cardiomegaly. No pleural or pericardial   effusion. Low lung volumes secondary to multisegmental bibasilar   atelectasis, unchanged since 2019. Pulmonary venousengorgement.   Enlargement of main pulmonary artery measuring 3.4 cm, consistent with   pulmonary arterial hypertension. Ascending thoracic aorta measures   maximally 3.6 cm the level of the main pulmonary artery. No gerardo central   pulmonary embolus. Negative for intraluminal thrombus within the left   atrial appendage.     Evaluation of the abdomen demonstrates that the bilateral adrenal glands   and pancreas are unremarkable. Gallbladder is collapsed and thick-walled.   There has been intervaldevelopment of hypoperfusion of the upper pole   and interpolar region of the left kidney which is enlarged with interval   development of surrounding perirenal edematous infiltration. There has   been interval development of circumferential mucosal enhancement of the   left ureter. No interval change in nonobstructing calculus within the   interpolar region of the left kidney measuring 7 mm. Mild periportal   edema. Evaluation of the gastrointestinal tract demonstrates postsurgical   changes of the anal canal with lower quadrant colostomy. No bowel   thickening. No bowel obstruction. Evaluation of the pelvis demonstrates   no interval change in severe circumferential mural thickening of the   bladder with significant perivesicular edematous infiltration and   interval development of mucosal enhancement. Joseph catheter balloon   within the bladder. Hysterectomy. No free fluid. Intervally increasing   clustered prominent lymph nodes in the left retroperitoneum at the level   of the left kidney measuring up to 14 mm, previously measuring 11 mm. No   interval change in clustered soft tissue nodules in the presacral region   measuring up to 1.8 cm. Small volume of free pelvic fluid in the   presacral region. No significant vascular calcification. Opacified   aspects of the hepatic, portal, splenic, superior mesenteric vein,   bilateral renal veins, IVC and bilateral iliac veins demonstrates no   gerardo intraluminal thrombus. Evaluation of the osseous structures   demonstrates degenerative change of the lower lumbar spine with no   destructive osseous lesion.      IMPRESSION:    Interval development of left pyelonephritis and progressive   cystitis/ureteritis. ID CONSULT NOTE    CHIEF COMPLAINT: Patient is a 59y old  Female who presents with a chief complaint of sepsis (2019 14:21)    HPI:  59 F w/ Stage IV Endometrial Carcinoma s/p staging surgery 2018 and 1 cycle of chemo in 2019, DM, low anterior resection mobilization of splenic flexure, end colostomy w/ rectovaginal fistula (reported to be healed) w/ numerous admissions for urinary tract infection and bacteremia who presents from [facility] with fever and n/v. The patient reports that for a few days before admission she was feeling generally unwell and nauseous. She was given Maalox and Pepto-Bismol which did not improve her symptoms. The next day she vomited NBNB vomitus and spiked a fever to 100.7F at her [facility] and she was then brought to the ED. The patient additionally reports a dry cough which she states is chronic. In the ED, she was found to have a positive UA and sepsis with CT findings suggestive of pyelonephritis. She was started on Meropenem and Vancomycin and was admitted to gynecology service. While in the ED, the patient was noted to be tachycardic and hypotensive with an acute change in mental status and was subsequently intubated and started on levophed. ID was consulted to assist with management of antibiotics as the patient has a history of MRSA (cultured from PICC line) and ESBL Proteus bacteremia (from UTI) on previous admissions. Of note, patient has a neurogenic bladder for which she has a chronic indwelling joseph. 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    MEDICATIONS  (STANDING):  chlorhexidine 2% Cloths 1 Application(s) Topical daily  chlorhexidine 4% Liquid 1 Application(s) Topical <User Schedule>  dextrose 5%. 1000 milliLiter(s) (50 mL/Hr) IV Continuous <Continuous>  dextrose 50% Injectable 12.5 Gram(s) IV Push once  dextrose 50% Injectable 25 Gram(s) IV Push once  dextrose 50% Injectable 25 Gram(s) IV Push once  enoxaparin Injectable 40 milliGRAM(s) SubCutaneous every 24 hours  insulin lispro (HumaLOG) corrective regimen sliding scale   SubCutaneous Before meals and at bedtime  megestrol 80 milliGRAM(s) Oral every 12 hours  meropenem  IVPB 1000 milliGRAM(s) IV Intermittent every 8 hours  meropenem  IVPB      norepinephrine Infusion 0.05 MICROgram(s)/kG/Min (5.016 mL/Hr) IV Continuous <Continuous>  pantoprazole  Injectable 40 milliGRAM(s) IV Push every 24 hours  petrolatum Ophthalmic Ointment 1 Application(s) Both EYES three times a day  propofol Infusion 5 MICROgram(s)/kG/Min (1.605 mL/Hr) IV Continuous <Continuous>  sodium chloride 0.9%. 1000 milliLiter(s) (111 mL/Hr) IV Continuous <Continuous>  vancomycin  IVPB 1000 milliGRAM(s) IV Intermittent every 12 hours  zinc oxide 20% Ointment 1 Application(s) Topical three times a day    MEDICATIONS  (PRN):  dextrose 40% Gel 15 Gram(s) Oral once PRN Blood Glucose LESS THAN 70 milliGRAM(s)/deciliter  glucagon  Injectable 1 milliGRAM(s) IntraMuscular once PRN Glucose LESS THAN 70 milligrams/deciliter  morphine  - Injectable 2 milliGRAM(s) IV Push every 4 hours PRN Severe Pain (7 - 10)  ondansetron Injectable 8 milliGRAM(s) IV Push every 8 hours PRN Nausea and/or Vomiting      Allergies    No Known Allergies    Intolerances    Vital Signs Last 24 Hrs  T(C): 37.8 (2019 13:45), Max: 38.2 (2019 21:18)  T(F): 100.1 (2019 13:45), Max: 100.8 (2019 21:18)  HR: 92 (2019 15:34) (92 - 131)  BP: 151/81 (2019 15:34) (86/54 - 151/81)  BP(mean): --  RR: 17 (2019 15:34) (14 - 25)  SpO2: 100% (2019 15:34) (94% - 100%)    PHYSICAL EXAM:  GEN: alert, NAD, comfortable in bed  HEENT: PERRL, EOMI, no pharyngeal erythema or exudate  CV: tachycardic, S1/S2, no murmurs appreciated  RESP: Mild bibasilar crackles, good respiratory effort, good air movement, no wheezes/rales  ABD: soft, BS+, nontender, nondistended, no guarding/rebound  EXTREMITIES: WWP  SKIN: warm, dry, intact, Stage 1 sacral ulcer with mild erythema, no purulence    LABS: reviewed.    CAPILLARY BLOOD GLUCOSE      POCT Blood Glucose.: 222 mg/dL (2019 12:23)  POCT Blood Glucose.: 171 mg/dL (2019 11:15)  POCT Blood Glucose.: 159 mg/dL (2019 06:44)                          9.9    17.21 )-----------( 191      ( 2019 12:57 )             34.0     04-30    132<L>  |  100  |  41<H>  ----------------------------<  230<H>  4.9   |  21<L>  |  0.94    Ca    9.0      2019 12:57  Phos  4.2     04-30  Mg     1.8     04-30    TPro  7.4  /  Alb  2.4<L>  /  TBili  <0.2  /  DBili  <0.2  /  AST  37  /  ALT  36  /  AlkPhos  131<H>  04-30    PT/INR - ( 2019 21:54 )   PT: 12.8 sec;   INR: 1.13          PTT - ( 2019 21:54 )  PTT:27.3 sec  LIVER FUNCTIONS - ( 2019 12:57 )  Alb: 2.4 g/dL / Pro: 7.4 g/dL / ALK PHOS: 131 U/L / ALT: 36 U/L / AST: 37 U/L / GGT: x           ABG - ( 2019 14:15 )  pH, Arterial: 7.25  pH, Blood: x     /  pCO2: 45    /  pO2: 162   / HCO3: 19    / Base Excess: -7.8  /  SaO2: 100       Urinalysis Basic - ( 2019 22:03 )  Color: Yellow / Appearance: Clear / S.020 / pH: x  Gluc: x / Ketone: NEGATIVE  / Bili: Negative / Urobili: 0.2 E.U./dL   Blood: x / Protein: 100 mg/dL / Nitrite: POSITIVE   Leuk Esterase: Small / RBC: < 5 /HPF / WBC Many /HPF   Sq Epi: x / Non Sq Epi: 5-10 /HPF / Bacteria: Many /HPF      CARDIAC MARKERS ( 2019 21:54 )  x     / <0.01 ng/mL / x     / x     / x        RADIOLOGY AND ADDITIONAL TESTS: reviewed.  < from: CT Abdomen and Pelvis w/ IV Cont (19 @ 00:55) >  FINDINGS:CT of the chest, abdomen and pelvis was performed with the   administration of intravenous contrast. Reconstructions were performed in   the sagittal and coronal planes. Comparison is made to prior studies   dated 2019 and 3/15/2019.    Evaluation of the chest demonstrates mild enlargement of the thyroid   gland. There is moderate cardiomegaly. No pleural or pericardial   effusion. Low lung volumes secondary to multisegmental bibasilar   atelectasis, unchanged since 2019. Pulmonary venousengorgement.   Enlargement of main pulmonary artery measuring 3.4 cm, consistent with   pulmonary arterial hypertension. Ascending thoracic aorta measures   maximally 3.6 cm the level of the main pulmonary artery. No gerardo central   pulmonary embolus. Negative for intraluminal thrombus within the left   atrial appendage.     Evaluation of the abdomen demonstrates that the bilateral adrenal glands   and pancreas are unremarkable. Gallbladder is collapsed and thick-walled.   There has been intervaldevelopment of hypoperfusion of the upper pole   and interpolar region of the left kidney which is enlarged with interval   development of surrounding perirenal edematous infiltration. There has   been interval development of circumferential mucosal enhancement of the   left ureter. No interval change in nonobstructing calculus within the   interpolar region of the left kidney measuring 7 mm. Mild periportal   edema. Evaluation of the gastrointestinal tract demonstrates postsurgical   changes of the anal canal with lower quadrant colostomy. No bowel   thickening. No bowel obstruction. Evaluation of the pelvis demonstrates   no interval change in severe circumferential mural thickening of the   bladder with significant perivesicular edematous infiltration and   interval development of mucosal enhancement. Joseph catheter balloon   within the bladder. Hysterectomy. No free fluid. Intervally increasing   clustered prominent lymph nodes in the left retroperitoneum at the level   of the left kidney measuring up to 14 mm, previously measuring 11 mm. No   interval change in clustered soft tissue nodules in the presacral region   measuring up to 1.8 cm. Small volume of free pelvic fluid in the   presacral region. No significant vascular calcification. Opacified   aspects of the hepatic, portal, splenic, superior mesenteric vein,   bilateral renal veins, IVC and bilateral iliac veins demonstrates no   gerardo intraluminal thrombus. Evaluation of the osseous structures   demonstrates degenerative change of the lower lumbar spine with no   destructive osseous lesion.      IMPRESSION:    Interval development of left pyelonephritis and progressive   cystitis/ureteritis.

## 2019-04-30 NOTE — CONSULT NOTE ADULT - ASSESSMENT
Patient is a 59 F w/ Stage IV Endometrial Carcinoma s/p staging surgery 7/2018 and 1 cycle of chemo in 2/2019, DM, low anterior resection mobilization of splenic flexure, end colostomy w/ rectovaginal fistula (reported to be healed) w/ numerous admissions for urinary tract infection and bacteremia who presents from [facility] with fever and n/v, who developed septic shock s/p PATRICA, source likely from complicated UTI.     #Sepsis  Patient with history of multiple presentations with bacteremia likely 2/2 rectovaginal fistula, now with neurogenic bladder and chronic indwelling joseph. History of MRSA (cultured from PICC line) and ESBL Proteus bacteremia (from UTI) on previous admissions, and completed a 14d course of Ertapenem and 3d of vancomycin. Now presenting with septic shock, likely from complicated UTI. CT abd/pelvis with signs of pyelonephritis ureteritis and cystitis. Would recommend continuing broad spectrum antibiotics for now given history of drug resistant infections and narrow coverage based on culture results, recommend:  -Continue Meropenem 1g Q8h, vancomycin 1g Q12  -will follow Bcx and Ucx  -If joseph was not changed on admission would recommend change of joseph  -will continue to follow Patient is a 59 F w/ Stage IV Endometrial Carcinoma s/p staging surgery 7/2018 and 1 cycle of chemo in 2/2019, DM, low anterior resection mobilization of splenic flexure, end colostomy w/ rectovaginal fistula (reported to be healed) w/ numerous admissions for urinary tract infection and bacteremia who presents from [facility] with fever and n/v, who developed septic shock s/p PATRICA, source likely from complicated UTI.     #Bacteremia  Patient with history of multiple presentations with bacteremia 2/2 rectovaginal fistula and neurogenic bladder with chronic indwelling joseph. History of MRSA (cultured from PICC line) and ESBL Proteus bacteremia (from UTI) on previous admissions, and completed a 14d course of Ertapenem and 3d of vancomycin. Now presenting with septic shock, found to have Gr negative bacteremia with source likely from complicated UTI. CT abd/pelvis with signs of pyelonephritis ureteritis and cystitis. Would recommend continuing broad spectrum antibiotics for now given history of drug resistant infections and narrow coverage based on culture results, recommend:  -Continue Meropenem 1g Q8h, vancomycin 1g Q12  -Bcx growing Gram negative rods in aerobic cultures, will continue to follow for speciation and susceptibly. Ucx pending  -If joseph was not changed on admission would recommend change of joseph  -will continue to follow Patient is a 59 F w/ Stage IV Endometrial Carcinoma s/p staging surgery 7/2018 and 1 cycle of chemo in 2/2019, DM, low anterior resection mobilization of splenic flexure, end colostomy w/ rectovaginal fistula (reported to be healed) w/ numerous admissions for urinary tract infection and bacteremia who presents from [facility] with fever and n/v, who developed septic shock s/p PATRICA, source likely from complicated UTI.     #Bacteremia  Patient with history of multiple presentations with bacteremia 2/2 rectovaginal fistula and neurogenic bladder with chronic indwelling joseph. History of MRSA (cultured from PICC line) and ESBL Proteus bacteremia (from UTI) on previous admissions, and completed a 14d course of Ertapenem and 3d of vancomycin. Now presenting with septic shock, found to have Gr negative bacteremia with source likely from complicated UTI. CT abd/pelvis with signs of pyelonephritis ureteritis and cystitis. Would recommend continuing broad spectrum antibiotics for now given history of drug resistant infections and narrow coverage based on culture results, recommend:  -Would continue vancomycin but her renal function is not stable.  Would send level 12 h after 1st dose and redose if <20  -Continue Meropenem 1g Q8h for now  -Amikacin 400 mg IV X 1 dose   -Bcx growing Gram negative rods in aerobic cultures, will continue to follow for speciation and susceptibly. Ucx pending  -If joseph was not changed on admission would recommend change of joseph  -will continue to follow- ID Team 1

## 2019-04-30 NOTE — PROGRESS NOTE ADULT - ATTENDING COMMENTS
Patient presented with fevers x several days (per patient). Hx of complicated UTI with previous admission. Neurogenic bladder requires that she remain catherized. Admission for presumed urosepsis with worsening mental and respiratory status. Patient seen intubated in the ED. MICU team present and managing airway and vent.     Exam notable for crackles at the lung base. Abdomen soft, mildly distended with good bowel sounds. Output in ostomy. Difficult to assess tenderness since patient is now intubated. Will continue to monitor for other possible sources of infection. For now, urosepsis is the leading diagnosis.     CT reviewed. Small interval increase in endometrial cancer tumor burden. No evidence of bowel obstruction.

## 2019-04-30 NOTE — PROGRESS NOTE ADULT - ASSESSMENT
58yo G0 with known stage IV endometrial carcinoma s/p staging surgery 7/2018 and 1 cycle of chemo in 2/2019 followed by multiple admissions for management of symptomatic enterovaginal fistula, complex fistula associated with urinary tract infection, bacteremia presents on referral from [facility] with sepsis, suspected source is urine.   -admit to telemetry  Neuro: no pain, morphine prn, tylenol prn  CV: NS @111cc/hr  Pulm: oxygen via nasal cannula  GI: NPO until CT A/P results  ID: s/p 1 dose of meropenem, awaiting approval of ertapenem, continue vanc for now  DVT ppx: SCDs, lovenox 40mg qd  ONC: awaiting CT results to assess if any progression of disease, continue megace 80mg IBD untill 5/2 then switch to tamoxifen 20mg BID for 3 weeks, continue metformin 1000mg BID

## 2019-04-30 NOTE — ED ADULT NURSE REASSESSMENT NOTE - NS ED NURSE REASSESS COMMENT FT1
Patient more lethargic and  mental status decreased .Patient  more tachycardic to 131 rapid response team called .

## 2019-04-30 NOTE — PROGRESS NOTE ADULT - ASSESSMENT
58yo G0 with known stage IV endometrial carcinoma s/p staging surgery 7/2018 and 1 cycle of chemo in 2/2019 followed by multiple admissions for management of symptomatic enterovaginal fistula, complex fistula associated with urinary tract infection, bacteremia presents on referral from [facility] with urosepsis.  Rapid response called due to worsening respiratory status and mental status, transferred to MICU

## 2019-04-30 NOTE — PROGRESS NOTE ADULT - ASSESSMENT
Assessment and Plan:     Neurology:    Pulmonary:    Cardiology:    GI:    :    Renal:    MSK:    Endocrine:    ID:    FEN: NPO, Replete lytes PRN K>4, Mg>2    PPx: Hep SQ, SCDs    Code: FULL CODE    Dispo: Patient requires continued monitoring in MICU Assessment and Plan: 59 F w/ Stage IV Endometrial Carcinoma s/p staging surgery 7/2018 and 1 cycle of chemo in 2/2019, DM, low anterior resection mobilization of splenic flexure, end colostomy w/ rectovaginal fistula w/ numerous admissions for urinary tract infection and bacteremia w/ acute hypoxic respiratory failure and AMS in the setting of urosepsis    Neurology:  #Metabolic encephalopathy  Pt w/ acute change in mental status likely in the setting of sepsis and acidosis. Currently on sedation with propofol  - S/p intubation; will continue w/ sedation and MV    Pulmonary:  #Acute hypoxic respiratory failure  Pt w/ changes in mentation in the setting of sepsis. S/p intubation and MV  - C/w VC/AC   - Will monitor and adjust vent as needed  - Daily sedation holiday and CPAP trial  - Treat underlying sepsis as below    Cardiology:  Septic shock 2/2 urosepsis  Pt w/ T-100.5, HR-128, RR-22, leukocytosis to 17. Requiring levophed  - S/p initiation of abx; vancomycin, meropenem and one dose of amikacin  - Lactate not elevated  - S/p 2L fluid resuscitation (>30cc/kg); reperfusion exam normal  - C/w levophed to titrate to MAP>65  - F/u blood and urine cultures  - F/u sputum culture  - If pt w/ positive blood cultures, will need to remove chemoport    GI:  #Ostomy  Pt w/ hx of enterovaginal fistula w/ ostomy bag in place  - Continue to monitor ostomy output    #R/o SBO  Pt w/ distended abdomen w/ reduced bowel sounds. Abdominal xray showing large amount of stool however no evidence of obstruction. Ostomy bag w/ hardened stool  - Will give bowel regimen to relieve hardened stools  - Continue to monitor for signs/symptoms of obstruction    :  #Urosepsis  Pt w/ hx of indwelling joseph w/ recurrent UTI on ertapenem as outpatient for ESBL and proteus mirabilis in urine and blood  - Will c/w vanc/meropenem; s/p one dose of amikacin   - F/u ID recommendations  - Continue management of sepsis as above    #Indwelling joseph  Pt w/ hx if indwelling joseph catheter. Was recently replaced within the past week   - C/w joseph  - Will call urology to assess need for joseph replacement    #Endometrial CA  Pt follows w/ GYN. F/u GYN recs    Renal:  #Mixed acid base disorder  Mixed primary respiratory acidosis w/ non-anion gap metabolic acidosis. Most recent ABG on VC/AC 50/400/17/5 7.25/45/162/19. Patient currently breathing with the vent  - Will increase RR to 17  - Check repeat ABG in 30 minutes  - Continue to monitor    MSK:  No active issues    Endocrine:  #DM  - F/u A1c  - FS/MISS     ID:  #Septic shock 2/2 UTI  - C/w meropenem and vancomycin for now  - S/p 1 dose of amikacin  - F/u ID recs  - Rest of plan as above    FEN: NPO, Replete lytes PRN K>4, Mg>2    PPx: Lovenox, SCDs    Code: FULL CODE    Dispo: Patient requires continued monitoring in MICU Assessment and Plan: 59 F w/ Stage IV Endometrial Carcinoma s/p staging surgery 7/2018 and 1 cycle of chemo in 2/2019, DM, low anterior resection mobilization of splenic flexure, end colostomy w/ rectovaginal fistula w/ numerous admissions for urinary tract infection and bacteremia w/ acute hypoxic respiratory failure and AMS in the setting of urosepsis    Neurology:  #Metabolic encephalopathy  Pt w/ acute change in mental status likely in the setting of sepsis and acidosis. Currently on sedation with propofol  - S/p intubation; will continue w/ sedation and MV    Pulmonary:  #Acute hypoxic respiratory failure  Pt w/ changes in mentation in the setting of sepsis. S/p intubation and MV  - C/w VC/AC   - Will monitor and adjust vent as needed  - Daily sedation holiday and CPAP trial  - Treat underlying sepsis as below    Cardiology:  Septic shock 2/2 urosepsis  Pt w/ T-100.5, HR-128, RR-22, leukocytosis to 17. Requiring levophed  - S/p initiation of abx; vancomycin, meropenem and one dose of amikacin  - Lactate not elevated  - S/p 2L fluid resuscitation (>30cc/kg); reperfusion exam normal  - C/w levophed to titrate to MAP>65  - F/u blood and urine cultures  - F/u sputum culture  - If pt w/ positive blood cultures, will need to remove chemoport    GI:  #Ostomy  Pt w/ hx of enterovaginal fistula w/ ostomy bag in place  - Continue to monitor ostomy output    #R/o SBO  Pt w/ distended abdomen w/ reduced bowel sounds. Abdominal xray showing large amount of stool however no evidence of obstruction. Ostomy bag w/ hardened stool  - Will give bowel regimen to relieve hardened stools  - Continue to monitor for signs/symptoms of obstruction    :  #Urosepsis  Pt w/ hx of indwelling joseph w/ recurrent UTI on ertapenem as outpatient for ESBL and proteus mirabilis in urine and blood  - Will c/w vanc/meropenem; s/p one dose of amikacin   - F/u ID recommendations  - Continue management of sepsis as above    #Indwelling joseph  Pt w/ hx if indwelling joseph catheter. Was recently replaced within the past week   - C/w joseph  - Will call urology to assess need for joseph replacement    #Endometrial CA  Pt follows w/ GYN. F/u GYN recs    Renal:  #Mixed acid base disorder  Mixed primary respiratory acidosis w/ non-anion gap metabolic acidosis. Most recent ABG on VC/AC 50/400/17/5 7.25/45/162/19. Patient currently breathing with the vent  - Will increase RR to 17  - Check repeat ABG in 30 minutes  - Continue to monitor    MSK:  No active issues    Endocrine:  #DM  - F/u A1c  - FS/MISS     ID:  #Septic shock 2/2 UTI  - C/w meropenem and vancomycin for now  - S/p 1 dose of amikacin  - F/u ID recs  - Rest of plan as above    FEN: NPO, Replete lytes PRN K>4, Mg>2    PPx: Lovenox, SCDs    Code: FULL CODE    Dispo: Patient requires continued monitoring in MICU  Critical care time rendered 60 minutes

## 2019-04-30 NOTE — CONSULT NOTE ADULT - ASSESSMENT
59 F w/ Stage IV Endometrial Carcinoma s/p staging surgery 7/2018 and 1 cycle of chemo in 2/2019 followed by multiple admissions for management of symptomatic enterovaginal fistula, complex fistula associated with urinary tract infection, bacteremia presents on referral from [facility] with urosepsis. ICU consulted for respiratory failure with worsening mental status change and unresponsiveness    #Hypoxic Respiratory Failure likely in setting of urosepsis, pyelonephritis s/p intubation due to metabolic encephalopathy, decreased responsiveness and inability to protect airway.   - per ID, will continue Vancomycin and Meropenem   -      Discussed w/ MICU fellow   Dispo: 59 F w/ Stage IV Endometrial Carcinoma s/p staging surgery 7/2018 and 1 cycle of chemo in 2/2019 followed by multiple admissions for management of symptomatic enterovaginal fistula, complex fistula associated with urinary tract infection, bacteremia presents on referral from [facility] with urosepsis. ICU consulted for respiratory failure with worsening mental status change and unresponsiveness      #Septic Shock: Extensive history of resistant organism UTI/ Pyelonephritis. s/p Meropenum Vancomycin, and Amikacin x 1 dose during rapid response   - currently on broad spectrum antibiotics, infectious disease following  - s/p 2 L NS bolus during rapid response; IVC visualized on bedside ultrasound with several b-lines; would hold off on more IVF boluses    #Hypoxic Respiratory Failure likely in setting of septic shock, s/p intubation due to metabolic encephalopathy, decreased responsiveness and inability to protect airway.   - post-intubation CXR with RLL atelectasis, no obvious infiltrate     #Stage IV Endometrial Carcinoma: per GYN-Onc     Discussed w/ MICU fellow   Dispo: MICU 59 F w/ Stage IV Endometrial Carcinoma s/p staging surgery 7/2018 and 1 cycle of chemo in 2/2019 followed by multiple admissions for management of symptomatic enterovaginal fistula, complex fistula associated with urinary tract infection, bacteremia presents on referral from [facility] with urosepsis. ICU consulted for respiratory failure with worsening mental status change and unresponsiveness      #Septic Shock: Extensive history of resistant organism UTI/ Pyelonephritis. s/p Meropenum Vancomycin, and Amikacin x 1 dose during rapid response   - currently on broad spectrum antibiotics, infectious disease following  - s/p 2 L NS bolus during rapid response; IVC visualized on bedside ultrasound with several b-lines; would hold off on more IVF boluses  - HD unstable, requiring Levophed, titrate to MAP goal > 65  - Lactate cleared   - ABG: NAGMA, repeat ABG    #Hypoxic Respiratory Failure likely in setting of septic shock, s/p intubation due to metabolic encephalopathy, decreased responsiveness and inability to protect airway.   - post-intubation CXR with RLL atelectasis, no obvious infiltrate   - CT Head     #Stage IV Endometrial Carcinoma: per GYN-Onc     Discussed w/ MICU fellow   Dispo: MICU 59 F w/ Stage IV Endometrial Carcinoma s/p staging surgery 7/2018 and 1 cycle of chemo in 2/2019 followed by multiple admissions for management of symptomatic enterovaginal fistula, complex fistula associated with urinary tract infection, bacteremia presents on referral from [facility] with urosepsis/ left pyelonephritis. ICU consulted for acute hypoxic respiratory failure with worsening mental status change and unresponsiveness with septic shock      #Septic Shock: Extensive history of resistant organism UTI/ Pyelonephritis. s/p Meropenum Vancomycin, and Amikacin x 1 dose during rapid response   - currently on broad spectrum antibiotics, infectious disease following  - s/p 2 L NS bolus during rapid response; IVC visualized on bedside ultrasound with several b-lines; would hold off on more IVF boluses  - HD unstable, requiring Levophed, titrate to MAP goal > 65  - Lactate cleared   - ABG: NAGMA, repeat ABG    #Hypoxic Respiratory Failure likely in setting of septic shock, s/p intubation due to metabolic encephalopathy, decreased responsiveness and inability to protect airway.   - post-intubation CXR with RLL atelectasis, no obvious infiltrate   - CT Head     #Stage IV Endometrial Carcinoma: per GYN-Onc     #Type II DM  -follow SSI with correctional insulin    Discussed w/ MICU fellow   Dispo: MICU  Critical care time rendered 50 minutes

## 2019-04-30 NOTE — PROGRESS NOTE ADULT - SUBJECTIVE AND OBJECTIVE BOX
GYN Progress Note    Patient seen at bedside.  She is in isolation in ER holding.  She is complaining of neck pain and is slightly diaphoretic.  . /70. fluids to be hung.  she received 1 dose of meropenem and one dose of vanc.    Denies CP, palpitations, SOB, fever, chills, nausea, vomiting.    Vital Signs Last 24 Hrs  T(C): 36.9 (2019 06:47), Max: 38.2 (2019 21:18)  T(F): 98.4 (2019 06:47), Max: 100.8 (2019 21:18)  HR: 112 (2019 06:47) (98 - 112)  BP: 103/71 (2019 06:47) (95/65 - 114/68)  RR: 17 (2019 06:47) (17 - 18)  SpO2: 100% (2019 06:47) (96% - 100%)    Physical Exam:  Gen: No Acute Distress, appears in slight discomfort, diaphoretic   Pulm: no respiratory distress  GI: soft, appropriately nontender, distended, stool in bag, possibly occluding opening  Ext: SCDs in place, wnl    I&O's Summary    MEDICATIONS  (STANDING):  dextrose 5%. 1000 milliLiter(s) (50 mL/Hr) IV Continuous <Continuous>  dextrose 50% Injectable 12.5 Gram(s) IV Push once  dextrose 50% Injectable 25 Gram(s) IV Push once  dextrose 50% Injectable 25 Gram(s) IV Push once  enoxaparin Injectable 40 milliGRAM(s) SubCutaneous every 24 hours  ertapenem  IVPB 1000 milliGRAM(s) IV Intermittent every 24 hours  insulin lispro (HumaLOG) corrective regimen sliding scale   SubCutaneous Before meals and at bedtime  megestrol 80 milliGRAM(s) Oral every 12 hours  pantoprazole  Injectable 40 milliGRAM(s) IV Push every 24 hours  sodium chloride 0.9%. 1000 milliLiter(s) (111 mL/Hr) IV Continuous <Continuous>  vancomycin  IVPB 1000 milliGRAM(s) IV Intermittent every 12 hours  zinc oxide 20% Ointment 1 Application(s) Topical three times a day    MEDICATIONS  (PRN):  dextrose 40% Gel 15 Gram(s) Oral once PRN Blood Glucose LESS THAN 70 milliGRAM(s)/deciliter  glucagon  Injectable 1 milliGRAM(s) IntraMuscular once PRN Glucose LESS THAN 70 milligrams/deciliter  morphine  - Injectable 2 milliGRAM(s) IV Push every 4 hours PRN Severe Pain (7 - 10)  ondansetron Injectable 8 milliGRAM(s) IV Push every 8 hours PRN Nausea and/or Vomiting      LABS:                        9.5    17.66 )-----------( 206      ( 2019 05:52 )             32.2         134<L>  |  101  |  43<H>  ----------------------------<  166<H>  4.4   |  22  |  1.01    Ca    9.0      2019 05:52  Phos  4.2       Mg     1.7         TPro  7.9  /  Alb  3.1<L>  /  TBili  0.2  /  DBili  x   /  AST  18  /  ALT  14  /  AlkPhos  114      PT/INR - ( 2019 21:54 )   PT: 12.8 sec;   INR: 1.13          PTT - ( 2019 21:54 )  PTT:27.3 sec  Urinalysis Basic - ( 2019 22:03 )    Color: Yellow / Appearance: Clear / S.020 / pH: x  Gluc: x / Ketone: NEGATIVE  / Bili: Negative / Urobili: 0.2 E.U./dL   Blood: x / Protein: 100 mg/dL / Nitrite: POSITIVE   Leuk Esterase: Small / RBC: < 5 /HPF / WBC Many /HPF   Sq Epi: x / Non Sq Epi: 5-10 /HPF / Bacteria: Many /HPF

## 2019-04-30 NOTE — PATIENT PROFILE ADULT - NSPRONUTRITIONRISK_GEN_A_NUR
Significant decrease of oral intake greater than 5 days prior to admission/Pressure injury stage 2 or greater

## 2019-04-30 NOTE — PROGRESS NOTE ADULT - SUBJECTIVE AND OBJECTIVE BOX
Hospital Course:  59 F w/ Stage IV Endometrial Carcinoma s/p staging surgery 2018 and 1 cycle of chemo in 2019 followed by multiple admissions for management of symptomatic enterovaginal fistula, complex fistula associated with urinary tract infection, bacteremia presents on referral from [facility] [place] with urosepsis. ICU consulted for respiratory failure with worsening mental status change and unresponsiveness. Patient has extensive UTI/Pyelonephritis history, was being treated with Ertapenem as outpatient, currently being followed by ID getting Meropenam and Vancomycin. Rapid response called for tachycardia > 130, acute mental status change, decreased responsiveness. Patient intubated while in ER holding. Patient given 2L NS bolus, Meropenam, Vancomycin, Levophed, and IV Tylenol during rapid response.     Overnight Events:    Subjective: Patient seen and examined at bedside.    [OBJECTIVE]:    Vital Signs:  T(F): , Max: 100.8 (19 @ 21:18)  HR:  (93 - 131)  BP:  (86/54 - 141/63)  BP(mean): --  RR:  (15 - 25)  SpO2:  (94% - 100%)  Wt(kg): --  CVP(cm H2O): --      CAPILLARY BLOOD GLUCOSE      POCT Blood Glucose.: 222 mg/dL (2019 12:23)      Physcial Exam:  T(F): 100.1 (19 @ 13:45)  HR: 93 (19 @ 14:16)  BP: 128/81 (19 @ 14:16)  RR: 15 (19 @ 14:16)  SpO2: 100% (19 @ 14:16)  Wt(kg): --    General: AO x 3, NAD, Comfortable, Pleasant, Anxious, Agitated, Ill, Frail, Cachectic, Resp distress  HEENT: PERRL/ EOMI, no scleral icterus, no ptosis, MMM, no JVD, no thyromegaly  Respiratory: CTA b/l, no wheezes, rales or rhonchi  Cardiovascular: Regular, +S1 + S2  Abdomen: Soft, NTND, normoactive bowel sounds, no rebound, no guarding, no suprapubic tenderness  Extremities: No cyanosis, no clubbing, no edema, pulses equal, no calf tenderness  Skin: No rashes  Lymphatic: No cervical/supraclavicular LAD  Neurological: CN II-XII grossly intact, follows commands, moves all extremities    Medications:  MEDICATIONS  (STANDING):  chlorhexidine 2% Cloths 1 Application(s) Topical daily  chlorhexidine 4% Liquid 1 Application(s) Topical <User Schedule>  dextrose 5%. 1000 milliLiter(s) (50 mL/Hr) IV Continuous <Continuous>  dextrose 50% Injectable 12.5 Gram(s) IV Push once  dextrose 50% Injectable 25 Gram(s) IV Push once  dextrose 50% Injectable 25 Gram(s) IV Push once  enoxaparin Injectable 40 milliGRAM(s) SubCutaneous every 24 hours  insulin lispro (HumaLOG) corrective regimen sliding scale   SubCutaneous Before meals and at bedtime  megestrol 80 milliGRAM(s) Oral every 12 hours  meropenem  IVPB 1000 milliGRAM(s) IV Intermittent every 8 hours  meropenem  IVPB      metFORMIN 1000 milliGRAM(s) Oral every 12 hours  norepinephrine Infusion 0.05 MICROgram(s)/kG/Min (5.016 mL/Hr) IV Continuous <Continuous>  pantoprazole  Injectable 40 milliGRAM(s) IV Push every 24 hours  propofol Infusion 5 MICROgram(s)/kG/Min (1.605 mL/Hr) IV Continuous <Continuous>  sodium chloride 0.9%. 1000 milliLiter(s) (111 mL/Hr) IV Continuous <Continuous>  vancomycin  IVPB 1000 milliGRAM(s) IV Intermittent every 12 hours  zinc oxide 20% Ointment 1 Application(s) Topical three times a day    MEDICATIONS  (PRN):  dextrose 40% Gel 15 Gram(s) Oral once PRN Blood Glucose LESS THAN 70 milliGRAM(s)/deciliter  glucagon  Injectable 1 milliGRAM(s) IntraMuscular once PRN Glucose LESS THAN 70 milligrams/deciliter  morphine  - Injectable 2 milliGRAM(s) IV Push every 4 hours PRN Severe Pain (7 - 10)  ondansetron Injectable 8 milliGRAM(s) IV Push every 8 hours PRN Nausea and/or Vomiting      Allergies:  Allergies    No Known Allergies    Intolerances        Labs:                        9.9    17.21 )-----------( 191      ( 2019 12:57 )             34.0     04-30    132<L>  |  100  |  41<H>  ----------------------------<  230<H>  4.9   |  21<L>  |  0.94    Ca    9.0      2019 12:57  Phos  4.2       Mg     1.8         TPro  7.4  /  Alb  2.4<L>  /  TBili  <0.2  /  DBili  <0.2  /  AST  37  /  ALT  36  /  AlkPhos  131<H>      PT/INR - ( 2019 21:54 )   PT: 12.8 sec;   INR: 1.13          PTT - ( 2019 21:54 )  PTT:27.3 sec  Urinalysis Basic - ( 2019 22:03 )    Color: Yellow / Appearance: Clear / S.020 / pH: x  Gluc: x / Ketone: NEGATIVE  / Bili: Negative / Urobili: 0.2 E.U./dL   Blood: x / Protein: 100 mg/dL / Nitrite: POSITIVE   Leuk Esterase: Small / RBC: < 5 /HPF / WBC Many /HPF   Sq Epi: x / Non Sq Epi: 5-10 /HPF / Bacteria: Many /HPF        Radiology and other tests: Hospital Course:  59 F w/ Stage IV Endometrial Carcinoma s/p staging surgery 2018 and 1 cycle of chemo in 2019, DM, low anterior resection mobilization of splenic flexure, end colostomy w/ rectovaginal fistula w/ numerous admissions for urinary tract infection and bacteremia presents on referral from [facility] with urosepsis. ICU consulted for respiratory failure with worsening mental status change and unresponsiveness. Patient has extensive UTI/Pyelonephritis history, was being treated with Ertapenem as outpatient, currently being followed by ID getting Meropenam and Vancomycin. Rapid response called for tachycardia > 130, acute mental status change, decreased responsiveness. Patient intubated while in ER holding. Patient given 2L NS bolus, Meropenem, Vancomycin, Levophed, and IV Tylenol during rapid response.     Subjective: Patient seen and examined at bedside. She is sedated and intubated. Unable to answer questions.     [OBJECTIVE]:    Vital Signs:  T(F): , Max: 100.8 (19 @ 21:18)  HR:  (93 - 131)  BP:  (86/54 - 141/63)  BP(mean): --  RR:  (15 - 25)  SpO2:  (94% - 100%)  Wt(kg): --  CVP(cm H2O): --      CAPILLARY BLOOD GLUCOSE      POCT Blood Glucose.: 222 mg/dL (2019 12:23)      Physical Exam:  T(F): 100.1 (19 @ 13:45)  HR: 93 (19 @ 14:16)  BP: 128/81 (19 @ 14:16)  RR: 15 (19 @ 14:16)  SpO2: 100% (19 @ 14:16)  Wt(kg): --    General: Frail female laying in bed sedated and intubated  HEENT: B/L eyes opened; PERRL, no scleral icterus, no ptosis, MMM, no JVD, no thyromegaly; +chemo port in place on R currently accessed w/ dressing CDI  Respiratory: B/L inspiratory wheezes and coarse rhonchi  Cardiovascular: Tachycardic, +S1 + S2 no MRG  Abdomen: Distended, large midline scar; +ostomy bag on L draining firm stool, abdomen soft, NT, hypoactive bowel sounds, no rebound, no guarding, no suprapubic tenderness  Extremities: No cyanosis, no clubbing, no edema, pulses equal, no calf tenderness, cap refill<2 sec  Skin: No rashes  Lymphatic: No cervical/supraclavicular LAD  Neurological: Sedated and intubated. Blinks to threat; + corneal reflex; +gag and cough reflex    Medications:  MEDICATIONS  (STANDING):  chlorhexidine 2% Cloths 1 Application(s) Topical daily  chlorhexidine 4% Liquid 1 Application(s) Topical <User Schedule>  dextrose 5%. 1000 milliLiter(s) (50 mL/Hr) IV Continuous <Continuous>  dextrose 50% Injectable 12.5 Gram(s) IV Push once  dextrose 50% Injectable 25 Gram(s) IV Push once  dextrose 50% Injectable 25 Gram(s) IV Push once  enoxaparin Injectable 40 milliGRAM(s) SubCutaneous every 24 hours  insulin lispro (HumaLOG) corrective regimen sliding scale   SubCutaneous Before meals and at bedtime  megestrol 80 milliGRAM(s) Oral every 12 hours  meropenem  IVPB 1000 milliGRAM(s) IV Intermittent every 8 hours  meropenem  IVPB      metFORMIN 1000 milliGRAM(s) Oral every 12 hours  norepinephrine Infusion 0.05 MICROgram(s)/kG/Min (5.016 mL/Hr) IV Continuous <Continuous>  pantoprazole  Injectable 40 milliGRAM(s) IV Push every 24 hours  propofol Infusion 5 MICROgram(s)/kG/Min (1.605 mL/Hr) IV Continuous <Continuous>  sodium chloride 0.9%. 1000 milliLiter(s) (111 mL/Hr) IV Continuous <Continuous>  vancomycin  IVPB 1000 milliGRAM(s) IV Intermittent every 12 hours  zinc oxide 20% Ointment 1 Application(s) Topical three times a day    MEDICATIONS  (PRN):  dextrose 40% Gel 15 Gram(s) Oral once PRN Blood Glucose LESS THAN 70 milliGRAM(s)/deciliter  glucagon  Injectable 1 milliGRAM(s) IntraMuscular once PRN Glucose LESS THAN 70 milligrams/deciliter  morphine  - Injectable 2 milliGRAM(s) IV Push every 4 hours PRN Severe Pain (7 - 10)  ondansetron Injectable 8 milliGRAM(s) IV Push every 8 hours PRN Nausea and/or Vomiting      Allergies:  Allergies    No Known Allergies    Intolerances        Labs:                        9.9    17.21 )-----------( 191      ( 2019 12:57 )             34.0     30    132<L>  |  100  |  41<H>  ----------------------------<  230<H>  4.9   |  21<L>  |  0.94    Ca    9.0      2019 12:57  Phos  4.2       Mg     1.8         TPro  7.4  /  Alb  2.4<L>  /  TBili  <0.2  /  DBili  <0.2  /  AST  37  /  ALT  36  /  AlkPhos  131<H>  30    PT/INR - ( 2019 21:54 )   PT: 12.8 sec;   INR: 1.13          PTT - ( 2019 21:54 )  PTT:27.3 sec  Urinalysis Basic - ( 2019 22:03 )    Color: Yellow / Appearance: Clear / S.020 / pH: x  Gluc: x / Ketone: NEGATIVE  / Bili: Negative / Urobili: 0.2 E.U./dL   Blood: x / Protein: 100 mg/dL / Nitrite: POSITIVE   Leuk Esterase: Small / RBC: < 5 /HPF / WBC Many /HPF   Sq Epi: x / Non Sq Epi: 5-10 /HPF / Bacteria: Many /HPF        Radiology and other tests:

## 2019-04-30 NOTE — PROGRESS NOTE ADULT - SUBJECTIVE AND OBJECTIVE BOX
[Late entry]    Called to bedside by nurse to assess patient due to worsening tachycardia and shortness of breath.  At bedside, pt visibly dyspneic and less responsive than baseline, unable to answer questions.  Tachycardic to 130s with bP 100s/70s, satting 95% on 3L.  Rectal temp 100.5.  Labored breathing with mild crackles on RLL.  Rapid response called, pt intubated in ERHO and started on pressors with propofol sedation.  Transferred to MICU service for management of urosepsis.      PHYSICAL EXAM:   Vital Signs Last 24 Hrs  T(C): 37.8 (2019 13:45), Max: 38.2 (2019 21:18)  T(F): 100.1 (2019 13:45), Max: 100.8 (2019 21:18)  HR: 93 (2019 14:16) (93 - 131)  BP: 138/76 (2019 14:49) (86/54 - 141/63)  BP(mean): --  RR: 14 (2019 14:49) (14 - 25)  SpO2: 100% (2019 14:49) (94% - 100%)        LABS:                        9.9    17.21 )-----------( 191      ( 2019 12:57 )             34.0     04-30    132<L>  |  100  |  41<H>  ----------------------------<  230<H>  4.9   |  21<L>  |  0.94    Ca    9.0      2019 12:57  Phos  4.2     04-30  Mg     1.8     04-30    TPro  7.4  /  Alb  2.4<L>  /  TBili  <0.2  /  DBili  <0.2  /  AST  37  /  ALT  36  /  AlkPhos  131<H>  04-30    PT/INR - ( 2019 21:54 )   PT: 12.8 sec;   INR: 1.13          PTT - ( 2019 21:54 )  PTT:27.3 sec  Urinalysis Basic - ( 2019 22:03 )    Color: Yellow / Appearance: Clear / S.020 / pH: x  Gluc: x / Ketone: NEGATIVE  / Bili: Negative / Urobili: 0.2 E.U./dL   Blood: x / Protein: 100 mg/dL / Nitrite: POSITIVE   Leuk Esterase: Small / RBC: < 5 /HPF / WBC Many /HPF   Sq Epi: x / Non Sq Epi: 5-10 /HPF / Bacteria: Many /HPF

## 2019-04-30 NOTE — CONSULT NOTE ADULT - SUBJECTIVE AND OBJECTIVE BOX
ICU Consult Medicine Resident Note    59 F w/ Stage IV Endometrial Carcinoma s/p staging surgery 2018 and 1 cycle of chemo in 2019 followed by multiple admissions for management of symptomatic enterovaginal fistula, complex fistula associated with urinary tract infection, bacteremia presents on referral from [facility] with urosepsis. ICU consulted for respiratory failure with worsening mental status change and unresponsiveness. Patient has extensive UTI/Pyelonephritis history, was being treated with Ertapenem as outpatient, currently being followed by ID getting Meropenam and Vancomycin. Rapid response called for tachycardia > 130, acute mental status change, decreased responsiveness. Patient intubated while in ER holding. Patient given 2L NS bolus, Meropenam, Vancomycin, and IV Tylenol during rapid response.     PMHx: T2DM, polio in childhood (reactivated), h/o breast cancer  SHx: left breast lumpectomy, right knee surgery with screw placement , 2018 exploratory laparotomy, enterolysis, SHAMIR, BSO, pelvic lymphadenectomy, low anterior resection mobilization of splenic flexure, end colostomy   Allergies: NKDA    Physical Examination:   Vital Signs Last 24 Hrs  T(C): 38.1 (2019 12:10), Max: 38.2 (2019 21:18)  T(F): 100.5 (2019 12:10), Max: 100.8 (2019 21:18)  HR: 128 (2019 12:10) (98 - 128)  BP: 100/71 (2019 12:10) (95/65 - 114/68)  BP(mean): --  RR: 22 (2019 12:10) (17 - 22)  SpO2: 96% (2019 12:10) (96% - 100%)  I&O's Detail    CAPILLARY BLOOD GLUCOSE      POCT Blood Glucose.: 222 mg/dL (2019 12:23)  POCT Blood Glucose.: 171 mg/dL (2019 11:15)  POCT Blood Glucose.: 159 mg/dL (2019 06:44)    General: decreased responsiveness, lethargic, toxic/ill-appearing   HEENT: dry mucous membranes   Neck: no JVD  Heart: tachycardia, regular, S1, S2  Lungs: poor inspiratory effort, no wheezing or rhonchi noted   Abdomen: +ostomy with output, high-pitched bowel sounds, midline, healed surgical scare, distended, TTP RLQ   Genitourinary: chronic indwelling joseph   Neurological: following commands, decreased responsiveness     Labs/Imaging:     < from: CT Chest w/ IV Cont (19 @ 01:01) >  EXAM:  CT CHEST IC                          EXAM:  CT ABDOMEN AND PELVIS IC                          PROCEDURE DATE:  2019          INTERPRETATION:  CLINICAL INDICATION: 89-year-old with abdominal pain,   nausea and vomiting.        FINDINGS:CT of the chest, abdomen and pelvis was performed with the   administration of intravenous contrast. Reconstructions were performed in   the sagittal and coronal planes. Comparison is made to prior studies   dated 2019 and 3/15/2019.    Evaluation of the chest demonstrates mild enlargement of the thyroid   gland. There is moderate cardiomegaly. No pleural or pericardial   effusion. Low lung volumes secondary to multisegmental bibasilar   atelectasis, unchanged since 2019. Pulmonary venousengorgement.   Enlargement of main pulmonary artery measuring 3.4 cm, consistent with   pulmonary arterial hypertension. Ascending thoracic aorta measures   maximally 3.6 cm the level of the main pulmonary artery. No gerardo central   pulmonary embolus. Negative for intraluminal thrombus within the left   atrial appendage.     Evaluation of the abdomen demonstrates that the bilateral adrenal glands   and pancreas are unremarkable. Gallbladder is collapsed and thick-walled.   There has been intervaldevelopment of hypoperfusion of the upper pole   and interpolar region of the left kidney which is enlarged with interval   development of surrounding perirenal edematous infiltration. There has   been interval development of circumferential mucosal enhancement of the   left ureter. No interval change in nonobstructing calculus within the   interpolar region of the left kidney measuring 7 mm. Mild periportal   edema. Evaluation of the gastrointestinal tract demonstrates postsurgical   changes of the anal canal with lower quadrant colostomy. No bowel   thickening. No bowel obstruction. Evaluation of the pelvis demonstrates   no interval change in severe circumferential mural thickening of the   bladder with significant perivesicular edematous infiltration and   interval development of mucosal enhancement. Joseph catheter balloon   within the bladder. Hysterectomy. No free fluid. Intervally increasing   clustered prominent lymph nodes in the left retroperitoneum at the level   of the left kidney measuring up to 14 mm, previously measuring 11 mm. No   interval change in clustered soft tissue nodules in the presacral region   measuring up to 1.8 cm. Small volume of free pelvic fluid in the   presacral region. No significant vascular calcification. Opacified   aspects of the hepatic, portal, splenic, superior mesenteric vein,   bilateral renal veins, IVC and bilateral iliac veins demonstrates no   gerardo intraluminal thrombus. Evaluation of the osseous structures   demonstrates degenerative change of the lower lumbar spine with no   destructive osseous lesion.        IMPRESSION:    Interval development of left pyelonephritis and progressive   cystitis/ureteritis.        < end of copied text >        CARDIAC MARKERS ( 2019 21:54 )  x     / <0.01 ng/mL / x     / x     / x          CBC Full  -  ( 2019 05:52 )  WBC Count : 17.66 K/uL  RBC Count : 3.21 M/uL  Hemoglobin : 9.5 g/dL  Hematocrit : 32.2 %  Platelet Count - Automated : 206 K/uL  Mean Cell Volume : 100.3 fl  Mean Cell Hemoglobin : 29.6 pg  Mean Cell Hemoglobin Concentration : 29.5 gm/dL  Auto Neutrophil # : x  Auto Lymphocyte # : x  Auto Monocyte # : x  Auto Eosinophil # : x  Auto Basophil # : x  Auto Neutrophil % : x  Auto Lymphocyte % : x  Auto Monocyte % : x  Auto Eosinophil % : x  Auto Basophil % : x    04-30    134<L>  |  101  |  43<H>  ----------------------------<  166<H>  4.4   |  22  |  1.01    Ca    9.0      2019 05:52  Phos  4.2     04-30  Mg     1.7     -30    TPro  7.9  /  Alb  3.1<L>  /  TBili  0.2  /  DBili  x   /  AST  18  /  ALT  14  /  AlkPhos  114      LIVER FUNCTIONS - ( 2019 21:54 )  Alb: 3.1 g/dL / Pro: 7.9 g/dL / ALK PHOS: 114 U/L / ALT: 14 U/L / AST: 18 U/L / GGT: x           PT/INR - ( 2019 21:54 )   PT: 12.8 sec;   INR: 1.13          PTT - ( 2019 21:54 )  PTT:27.3 sec  Urinalysis Basic - ( 2019 22:03 )    Color: Yellow / Appearance: Clear / S.020 / pH: x  Gluc: x / Ketone: NEGATIVE  / Bili: Negative / Urobili: 0.2 E.U./dL   Blood: x / Protein: 100 mg/dL / Nitrite: POSITIVE   Leuk Esterase: Small / RBC: < 5 /HPF / WBC Many /HPF   Sq Epi: x / Non Sq Epi: 5-10 /HPF / Bacteria: Many /HPF ICU Consult Medicine Resident Note    59 F w/ Stage IV Endometrial Carcinoma s/p staging surgery 2018 and 1 cycle of chemo in 2019 followed by multiple admissions for management of symptomatic enterovaginal fistula, complex fistula associated with urinary tract infection, bacteremia presents on referral from [facility] with urosepsis. ICU consulted for respiratory failure with worsening mental status change and unresponsiveness. Patient has extensive UTI/Pyelonephritis history, was being treated with Ertapenem as outpatient, currently being followed by ID getting Meropenam and Vancomycin. Rapid response called for tachycardia > 130, acute mental status change, decreased responsiveness. Patient intubated while in ER holding. Patient given 2L NS bolus, Meropenam, Vancomycin, Levophed, and IV Tylenol during rapid response.     PMHx: T2DM, polio in childhood (reactivated), h/o breast cancer  SHx: left breast lumpectomy, right knee surgery with screw placement , 2018 exploratory laparotomy, enterolysis, SHAMIR, BSO, pelvic lymphadenectomy, low anterior resection mobilization of splenic flexure, end colostomy   Allergies: NKDA    Physical Examination:   Vital Signs Last 24 Hrs  T(C): 38.1 (2019 12:10), Max: 38.2 (2019 21:18)  T(F): 100.5 (2019 12:10), Max: 100.8 (2019 21:18)  HR: 128 (2019 12:10) (98 - 128)  BP: 100/71 (2019 12:10) (95/65 - 114/68)  BP(mean): --  RR: 22 (2019 12:10) (17 - 22)  SpO2: 96% (2019 12:10) (96% - 100%)  I&O's Detail    CAPILLARY BLOOD GLUCOSE      POCT Blood Glucose.: 222 mg/dL (2019 12:23)  POCT Blood Glucose.: 171 mg/dL (2019 11:15)  POCT Blood Glucose.: 159 mg/dL (2019 06:44)    General: decreased responsiveness, lethargic, toxic/ill-appearing   HEENT: dry mucous membranes   Neck: no JVD  Heart: tachycardia, regular, S1, S2  Lungs: poor inspiratory effort, no wheezing or rhonchi noted   Abdomen: +ostomy with output, high-pitched bowel sounds, midline, healed surgical scare, distended, TTP RLQ   Genitourinary: chronic indwelling joseph   Neurological: following commands, decreased responsiveness     Labs/Imaging:     < from: CT Chest w/ IV Cont (19 @ 01:01) >  EXAM:  CT CHEST IC                          EXAM:  CT ABDOMEN AND PELVIS IC                          PROCEDURE DATE:  2019          INTERPRETATION:  CLINICAL INDICATION: 89-year-old with abdominal pain,   nausea and vomiting.        FINDINGS:CT of the chest, abdomen and pelvis was performed with the   administration of intravenous contrast. Reconstructions were performed in   the sagittal and coronal planes. Comparison is made to prior studies   dated 2019 and 3/15/2019.    Evaluation of the chest demonstrates mild enlargement of the thyroid   gland. There is moderate cardiomegaly. No pleural or pericardial   effusion. Low lung volumes secondary to multisegmental bibasilar   atelectasis, unchanged since 2019. Pulmonary venousengorgement.   Enlargement of main pulmonary artery measuring 3.4 cm, consistent with   pulmonary arterial hypertension. Ascending thoracic aorta measures   maximally 3.6 cm the level of the main pulmonary artery. No gerardo central   pulmonary embolus. Negative for intraluminal thrombus within the left   atrial appendage.     Evaluation of the abdomen demonstrates that the bilateral adrenal glands   and pancreas are unremarkable. Gallbladder is collapsed and thick-walled.   There has been intervaldevelopment of hypoperfusion of the upper pole   and interpolar region of the left kidney which is enlarged with interval   development of surrounding perirenal edematous infiltration. There has   been interval development of circumferential mucosal enhancement of the   left ureter. No interval change in nonobstructing calculus within the   interpolar region of the left kidney measuring 7 mm. Mild periportal   edema. Evaluation of the gastrointestinal tract demonstrates postsurgical   changes of the anal canal with lower quadrant colostomy. No bowel   thickening. No bowel obstruction. Evaluation of the pelvis demonstrates   no interval change in severe circumferential mural thickening of the   bladder with significant perivesicular edematous infiltration and   interval development of mucosal enhancement. Joseph catheter balloon   within the bladder. Hysterectomy. No free fluid. Intervally increasing   clustered prominent lymph nodes in the left retroperitoneum at the level   of the left kidney measuring up to 14 mm, previously measuring 11 mm. No   interval change in clustered soft tissue nodules in the presacral region   measuring up to 1.8 cm. Small volume of free pelvic fluid in the   presacral region. No significant vascular calcification. Opacified   aspects of the hepatic, portal, splenic, superior mesenteric vein,   bilateral renal veins, IVC and bilateral iliac veins demonstrates no   gerardo intraluminal thrombus. Evaluation of the osseous structures   demonstrates degenerative change of the lower lumbar spine with no   destructive osseous lesion.        IMPRESSION:    Interval development of left pyelonephritis and progressive   cystitis/ureteritis.        < end of copied text >        CARDIAC MARKERS ( 2019 21:54 )  x     / <0.01 ng/mL / x     / x     / x          CBC Full  -  ( 2019 05:52 )  WBC Count : 17.66 K/uL  RBC Count : 3.21 M/uL  Hemoglobin : 9.5 g/dL  Hematocrit : 32.2 %  Platelet Count - Automated : 206 K/uL  Mean Cell Volume : 100.3 fl  Mean Cell Hemoglobin : 29.6 pg  Mean Cell Hemoglobin Concentration : 29.5 gm/dL  Auto Neutrophil # : x  Auto Lymphocyte # : x  Auto Monocyte # : x  Auto Eosinophil # : x  Auto Basophil # : x  Auto Neutrophil % : x  Auto Lymphocyte % : x  Auto Monocyte % : x  Auto Eosinophil % : x  Auto Basophil % : x    04-30    134<L>  |  101  |  43<H>  ----------------------------<  166<H>  4.4   |  22  |  1.01    Ca    9.0      2019 05:52  Phos  4.2     -30  Mg     1.7     -30    TPro  7.9  /  Alb  3.1<L>  /  TBili  0.2  /  DBili  x   /  AST  18  /  ALT  14  /  AlkPhos  114      LIVER FUNCTIONS - ( 2019 21:54 )  Alb: 3.1 g/dL / Pro: 7.9 g/dL / ALK PHOS: 114 U/L / ALT: 14 U/L / AST: 18 U/L / GGT: x           PT/INR - ( 2019 21:54 )   PT: 12.8 sec;   INR: 1.13          PTT - ( 2019 21:54 )  PTT:27.3 sec  Urinalysis Basic - ( 2019 22:03 )    Color: Yellow / Appearance: Clear / S.020 / pH: x  Gluc: x / Ketone: NEGATIVE  / Bili: Negative / Urobili: 0.2 E.U./dL   Blood: x / Protein: 100 mg/dL / Nitrite: POSITIVE   Leuk Esterase: Small / RBC: < 5 /HPF / WBC Many /HPF   Sq Epi: x / Non Sq Epi: 5-10 /HPF / Bacteria: Many /HPF ICU Consult Medicine Resident Note    59 F w/ Stage IV Endometrial Carcinoma s/p staging surgery 2018 and 1 cycle of chemo in 2019 followed by multiple admissions for management of symptomatic enterovaginal fistula, complex fistula associated with urinary tract infection, bacteremia presents on referral from [facility] with urosepsis. ICU consulted for respiratory failure with worsening mental status change and unresponsiveness. Patient has extensive UTI/Pyelonephritis history, was being treated with Ertapenem as outpatient, currently being followed by ID getting Meropenam and Vancomycin. Rapid response called for tachycardia > 130, acute mental status change, decreased responsiveness. Patient intubated while in ER holding. Patient given 2L NS bolus, Meropenem, Vancomycin, Levophed, and IV Tylenol during rapid response.     PMHx: T2DM, polio in childhood (reactivated), h/o breast cancer  SHx: left breast lumpectomy, right knee surgery with screw placement , 2018 exploratory laparotomy, enterolysis, SHAMIR, BSO, pelvic lymphadenectomy, low anterior resection mobilization of splenic flexure, end colostomy   Allergies: NKDA    Physical Examination:   Vital Signs Last 24 Hrs  T(C): 38.1 (2019 12:10), Max: 38.2 (2019 21:18)  T(F): 100.5 (2019 12:10), Max: 100.8 (2019 21:18)  HR: 128 (2019 12:10) (98 - 128)  BP: 100/71 (2019 12:10) (95/65 - 114/68)  BP(mean): --  RR: 22 (2019 12:10) (17 - 22)  SpO2: 96% (2019 12:10) (96% - 100%)  I&O's Detail    CAPILLARY BLOOD GLUCOSE      POCT Blood Glucose.: 222 mg/dL (2019 12:23)  POCT Blood Glucose.: 171 mg/dL (2019 11:15)  POCT Blood Glucose.: 159 mg/dL (2019 06:44)    General: decreased responsiveness, lethargic, toxic/ill-appearing   HEENT: dry mucous membranes PERRL  Neck: no JVD  Heart: tachycardia, regular, S1, S2  Lungs: poor inspiratory effort, no wheezing or rhonchi noted   Abdomen: +ostomy with output, high-pitched bowel sounds, midline, healed surgical scare, distended, TTP RLQ   Genitourinary: chronic indwelling joseph   Ext: no cyanosis  Neurological: following commands, decreased responsiveness   Skin: no rash  MSK: no joint swelling    Labs/Imaging:     < from: CT Chest w/ IV Cont (19 @ 01:01) >  EXAM:  CT CHEST IC                          EXAM:  CT ABDOMEN AND PELVIS IC                          PROCEDURE DATE:  2019          INTERPRETATION:  CLINICAL INDICATION: 89-year-old with abdominal pain,   nausea and vomiting.        FINDINGS:CT of the chest, abdomen and pelvis was performed with the   administration of intravenous contrast. Reconstructions were performed in   the sagittal and coronal planes. Comparison is made to prior studies   dated 2019 and 3/15/2019.    Evaluation of the chest demonstrates mild enlargement of the thyroid   gland. There is moderate cardiomegaly. No pleural or pericardial   effusion. Low lung volumes secondary to multisegmental bibasilar   atelectasis, unchanged since 2019. Pulmonary venousengorgement.   Enlargement of main pulmonary artery measuring 3.4 cm, consistent with   pulmonary arterial hypertension. Ascending thoracic aorta measures   maximally 3.6 cm the level of the main pulmonary artery. No gerardo central   pulmonary embolus. Negative for intraluminal thrombus within the left   atrial appendage.     Evaluation of the abdomen demonstrates that the bilateral adrenal glands   and pancreas are unremarkable. Gallbladder is collapsed and thick-walled.   There has been intervaldevelopment of hypoperfusion of the upper pole   and interpolar region of the left kidney which is enlarged with interval   development of surrounding perirenal edematous infiltration. There has   been interval development of circumferential mucosal enhancement of the   left ureter. No interval change in nonobstructing calculus within the   interpolar region of the left kidney measuring 7 mm. Mild periportal   edema. Evaluation of the gastrointestinal tract demonstrates postsurgical   changes of the anal canal with lower quadrant colostomy. No bowel   thickening. No bowel obstruction. Evaluation of the pelvis demonstrates   no interval change in severe circumferential mural thickening of the   bladder with significant perivesicular edematous infiltration and   interval development of mucosal enhancement. Joseph catheter balloon   within the bladder. Hysterectomy. No free fluid. Intervally increasing   clustered prominent lymph nodes in the left retroperitoneum at the level   of the left kidney measuring up to 14 mm, previously measuring 11 mm. No   interval change in clustered soft tissue nodules in the presacral region   measuring up to 1.8 cm. Small volume of free pelvic fluid in the   presacral region. No significant vascular calcification. Opacified   aspects of the hepatic, portal, splenic, superior mesenteric vein,   bilateral renal veins, IVC and bilateral iliac veins demonstrates no   gerardo intraluminal thrombus. Evaluation of the osseous structures   demonstrates degenerative change of the lower lumbar spine with no   destructive osseous lesion.        IMPRESSION:    Interval development of left pyelonephritis and progressive   cystitis/ureteritis.        < end of copied text >        CARDIAC MARKERS ( 2019 21:54 )  x     / <0.01 ng/mL / x     / x     / x          CBC Full  -  ( 2019 05:52 )  WBC Count : 17.66 K/uL  RBC Count : 3.21 M/uL  Hemoglobin : 9.5 g/dL  Hematocrit : 32.2 %  Platelet Count - Automated : 206 K/uL  Mean Cell Volume : 100.3 fl  Mean Cell Hemoglobin : 29.6 pg  Mean Cell Hemoglobin Concentration : 29.5 gm/dL  Auto Neutrophil # : x  Auto Lymphocyte # : x  Auto Monocyte # : x  Auto Eosinophil # : x  Auto Basophil # : x  Auto Neutrophil % : x  Auto Lymphocyte % : x  Auto Monocyte % : x  Auto Eosinophil % : x  Auto Basophil % : x    04-30    134<L>  |  101  |  43<H>  ----------------------------<  166<H>  4.4   |  22  |  1.01    Ca    9.0      2019 05:52  Phos  4.2     04-30  Mg     1.7     04-30    TPro  7.9  /  Alb  3.1<L>  /  TBili  0.2  /  DBili  x   /  AST  18  /  ALT  14  /  AlkPhos  114  04-29    LIVER FUNCTIONS - ( 2019 21:54 )  Alb: 3.1 g/dL / Pro: 7.9 g/dL / ALK PHOS: 114 U/L / ALT: 14 U/L / AST: 18 U/L / GGT: x           PT/INR - ( 2019 21:54 )   PT: 12.8 sec;   INR: 1.13          PTT - ( 2019 21:54 )  PTT:27.3 sec  Urinalysis Basic - ( 2019 22:03 )    Color: Yellow / Appearance: Clear / S.020 / pH: x  Gluc: x / Ketone: NEGATIVE  / Bili: Negative / Urobili: 0.2 E.U./dL   Blood: x / Protein: 100 mg/dL / Nitrite: POSITIVE   Leuk Esterase: Small / RBC: < 5 /HPF / WBC Many /HPF   Sq Epi: x / Non Sq Epi: 5-10 /HPF / Bacteria: Many /HPF

## 2019-05-01 LAB
-  KPC RESISTANCE GENE: SIGNIFICANT CHANGE UP
ALBUMIN SERPL ELPH-MCNC: 2.1 G/DL — LOW (ref 3.3–5)
ALBUMIN SERPL ELPH-MCNC: 4.1 G/DL
ALP BLD-CCNC: 116 U/L
ALP SERPL-CCNC: 117 U/L — SIGNIFICANT CHANGE UP (ref 40–120)
ALT FLD-CCNC: 29 U/L — SIGNIFICANT CHANGE UP (ref 10–45)
ALT SERPL-CCNC: 20 U/L
ANION GAP SERPL CALC-SCNC: 13 MMOL/L — SIGNIFICANT CHANGE UP (ref 5–17)
ANION GAP SERPL CALC-SCNC: 16 MMOL/L
AST SERPL-CCNC: 20 U/L
AST SERPL-CCNC: 26 U/L — SIGNIFICANT CHANGE UP (ref 10–40)
BASOPHILS # BLD AUTO: 0 K/UL
BASOPHILS NFR BLD AUTO: 0 %
BILIRUB SERPL-MCNC: 0.2 MG/DL
BILIRUB SERPL-MCNC: 0.2 MG/DL — SIGNIFICANT CHANGE UP (ref 0.2–1.2)
BLD GP AB SCN SERPL QL: NEGATIVE — SIGNIFICANT CHANGE UP
BUN SERPL-MCNC: 28 MG/DL — HIGH (ref 7–23)
BUN SERPL-MCNC: 29 MG/DL
CALCIUM SERPL-MCNC: 11 MG/DL
CALCIUM SERPL-MCNC: 8.6 MG/DL — SIGNIFICANT CHANGE UP (ref 8.4–10.5)
CANCER AG125 SERPL-ACNC: 63 U/ML
CHLORIDE SERPL-SCNC: 106 MMOL/L
CHLORIDE SERPL-SCNC: 108 MMOL/L — SIGNIFICANT CHANGE UP (ref 96–108)
CO2 SERPL-SCNC: 16 MMOL/L — LOW (ref 22–31)
CO2 SERPL-SCNC: 22 MMOL/L
CREAT SERPL-MCNC: 0.56 MG/DL
CREAT SERPL-MCNC: 0.8 MG/DL — SIGNIFICANT CHANGE UP (ref 0.5–1.3)
E COLI DNA BLD POS QL NAA+NON-PROBE: SIGNIFICANT CHANGE UP
EOSINOPHIL # BLD AUTO: 0.06 K/UL
EOSINOPHIL NFR BLD AUTO: 0.9 %
GLUCOSE BLDC GLUCOMTR-MCNC: 109 MG/DL — HIGH (ref 70–99)
GLUCOSE BLDC GLUCOMTR-MCNC: 114 MG/DL — HIGH (ref 70–99)
GLUCOSE BLDC GLUCOMTR-MCNC: 126 MG/DL — HIGH (ref 70–99)
GLUCOSE BLDC GLUCOMTR-MCNC: 152 MG/DL — HIGH (ref 70–99)
GLUCOSE SERPL-MCNC: 147 MG/DL — HIGH (ref 70–99)
GRAM STN FLD: SIGNIFICANT CHANGE UP
HCT VFR BLD CALC: 31 % — LOW (ref 34.5–45)
HCT VFR BLD CALC: 39.9 %
HGB BLD-MCNC: 11.2 G/DL
HGB BLD-MCNC: 9.3 G/DL — LOW (ref 11.5–15.5)
LYMPHOCYTES # BLD AUTO: 1.61 K/UL
LYMPHOCYTES NFR BLD AUTO: 23.9 %
MAGNESIUM SERPL-MCNC: 1.6 MG/DL — SIGNIFICANT CHANGE UP (ref 1.6–2.6)
MAGNESIUM SERPL-MCNC: 1.7 MG/DL
MAN DIFF?: NORMAL
MCHC RBC-ENTMCNC: 28.1 GM/DL
MCHC RBC-ENTMCNC: 29.6 PG — SIGNIFICANT CHANGE UP (ref 27–34)
MCHC RBC-ENTMCNC: 29.9 PG
MCHC RBC-ENTMCNC: 30 GM/DL — LOW (ref 32–36)
MCV RBC AUTO: 106.4 FL
MCV RBC AUTO: 98.7 FL — SIGNIFICANT CHANGE UP (ref 80–100)
METHOD TYPE: SIGNIFICANT CHANGE UP
MONOCYTES # BLD AUTO: 0.42 K/UL
MONOCYTES NFR BLD AUTO: 6.2 %
NEUTROPHILS # BLD AUTO: 4.64 K/UL
NEUTROPHILS NFR BLD AUTO: 69 %
NRBC # BLD: 0 /100 WBCS — SIGNIFICANT CHANGE UP (ref 0–0)
PHOSPHATE SERPL-MCNC: 2.3 MG/DL — LOW (ref 2.5–4.5)
PLATELET # BLD AUTO: 233 K/UL — SIGNIFICANT CHANGE UP (ref 150–400)
PLATELET # BLD AUTO: 338 K/UL
POTASSIUM SERPL-MCNC: 4.2 MMOL/L — SIGNIFICANT CHANGE UP (ref 3.5–5.3)
POTASSIUM SERPL-SCNC: 4.2 MMOL/L — SIGNIFICANT CHANGE UP (ref 3.5–5.3)
POTASSIUM SERPL-SCNC: 4.4 MMOL/L
PREALB SERPL NEPH-MCNC: 20 MG/DL
PROT SERPL-MCNC: 6.5 G/DL — SIGNIFICANT CHANGE UP (ref 6–8.3)
PROT SERPL-MCNC: 8.2 G/DL
RBC # BLD: 3.14 M/UL — LOW (ref 3.8–5.2)
RBC # BLD: 3.75 M/UL
RBC # FLD: 15.4 % — HIGH (ref 10.3–14.5)
RBC # FLD: 15.9 %
RH IG SCN BLD-IMP: POSITIVE — SIGNIFICANT CHANGE UP
SODIUM SERPL-SCNC: 137 MMOL/L — SIGNIFICANT CHANGE UP (ref 135–145)
SODIUM SERPL-SCNC: 144 MMOL/L
VANCOMYCIN FLD-MCNC: 15.7 UG/ML — SIGNIFICANT CHANGE UP
VANCOMYCIN FLD-MCNC: 19.3 UG/ML — SIGNIFICANT CHANGE UP
VANCOMYCIN FLD-MCNC: 21.9 UG/ML — SIGNIFICANT CHANGE UP
WBC # BLD: 15.04 K/UL — HIGH (ref 3.8–10.5)
WBC # FLD AUTO: 15.04 K/UL — HIGH (ref 3.8–10.5)
WBC # FLD AUTO: 6.73 K/UL

## 2019-05-01 PROCEDURE — 99232 SBSQ HOSP IP/OBS MODERATE 35: CPT | Mod: GC

## 2019-05-01 PROCEDURE — 99291 CRITICAL CARE FIRST HOUR: CPT

## 2019-05-01 PROCEDURE — 99233 SBSQ HOSP IP/OBS HIGH 50: CPT

## 2019-05-01 PROCEDURE — 71045 X-RAY EXAM CHEST 1 VIEW: CPT | Mod: 26,76

## 2019-05-01 PROCEDURE — 93010 ELECTROCARDIOGRAM REPORT: CPT

## 2019-05-01 RX ORDER — POTASSIUM PHOSPHATE, MONOBASIC POTASSIUM PHOSPHATE, DIBASIC 236; 224 MG/ML; MG/ML
15 INJECTION, SOLUTION INTRAVENOUS ONCE
Qty: 0 | Refills: 0 | Status: COMPLETED | OUTPATIENT
Start: 2019-05-01 | End: 2019-05-01

## 2019-05-01 RX ORDER — INSULIN LISPRO 100/ML
VIAL (ML) SUBCUTANEOUS EVERY 6 HOURS
Qty: 0 | Refills: 0 | Status: DISCONTINUED | OUTPATIENT
Start: 2019-05-01 | End: 2019-05-03

## 2019-05-01 RX ORDER — CHLORHEXIDINE GLUCONATE 213 G/1000ML
15 SOLUTION TOPICAL EVERY 12 HOURS
Qty: 0 | Refills: 0 | Status: DISCONTINUED | OUTPATIENT
Start: 2019-05-01 | End: 2019-05-03

## 2019-05-01 RX ORDER — VANCOMYCIN HCL 1 G
750 VIAL (EA) INTRAVENOUS ONCE
Qty: 0 | Refills: 0 | Status: COMPLETED | OUTPATIENT
Start: 2019-05-01 | End: 2019-05-01

## 2019-05-01 RX ORDER — TAMOXIFEN CITRATE 20 MG/1
1 TABLET, FILM COATED ORAL
Qty: 42 | Refills: 0 | OUTPATIENT
Start: 2019-05-01 | End: 2019-05-21

## 2019-05-01 RX ORDER — MAGNESIUM SULFATE 500 MG/ML
2 VIAL (ML) INJECTION ONCE
Qty: 0 | Refills: 0 | Status: COMPLETED | OUTPATIENT
Start: 2019-05-01 | End: 2019-05-01

## 2019-05-01 RX ADMIN — Medication 250 MILLIGRAM(S): at 08:54

## 2019-05-01 RX ADMIN — Medication 50 GRAM(S): at 10:33

## 2019-05-01 RX ADMIN — Medication 1 APPLICATION(S): at 22:02

## 2019-05-01 RX ADMIN — ZINC OXIDE 1 APPLICATION(S): 200 OINTMENT TOPICAL at 22:02

## 2019-05-01 RX ADMIN — ENOXAPARIN SODIUM 40 MILLIGRAM(S): 100 INJECTION SUBCUTANEOUS at 23:00

## 2019-05-01 RX ADMIN — MEROPENEM 100 MILLIGRAM(S): 1 INJECTION INTRAVENOUS at 05:22

## 2019-05-01 RX ADMIN — CHLORHEXIDINE GLUCONATE 1 APPLICATION(S): 213 SOLUTION TOPICAL at 17:33

## 2019-05-01 RX ADMIN — ENOXAPARIN SODIUM 40 MILLIGRAM(S): 100 INJECTION SUBCUTANEOUS at 01:05

## 2019-05-01 RX ADMIN — POTASSIUM PHOSPHATE, MONOBASIC POTASSIUM PHOSPHATE, DIBASIC 63.75 MILLIMOLE(S): 236; 224 INJECTION, SOLUTION INTRAVENOUS at 10:33

## 2019-05-01 RX ADMIN — ZINC OXIDE 1 APPLICATION(S): 200 OINTMENT TOPICAL at 13:10

## 2019-05-01 RX ADMIN — SODIUM CHLORIDE 111 MILLILITER(S): 9 INJECTION INTRAMUSCULAR; INTRAVENOUS; SUBCUTANEOUS at 08:26

## 2019-05-01 RX ADMIN — PROPOFOL 1.6 MICROGRAM(S)/KG/MIN: 10 INJECTION, EMULSION INTRAVENOUS at 05:21

## 2019-05-01 RX ADMIN — MEROPENEM 100 MILLIGRAM(S): 1 INJECTION INTRAVENOUS at 13:09

## 2019-05-01 RX ADMIN — Medication 1 APPLICATION(S): at 05:25

## 2019-05-01 RX ADMIN — Medication 1 APPLICATION(S): at 13:10

## 2019-05-01 RX ADMIN — CHLORHEXIDINE GLUCONATE 15 MILLILITER(S): 213 SOLUTION TOPICAL at 17:35

## 2019-05-01 RX ADMIN — ZINC OXIDE 1 APPLICATION(S): 200 OINTMENT TOPICAL at 05:25

## 2019-05-01 RX ADMIN — PANTOPRAZOLE SODIUM 40 MILLIGRAM(S): 20 TABLET, DELAYED RELEASE ORAL at 05:52

## 2019-05-01 RX ADMIN — MEROPENEM 100 MILLIGRAM(S): 1 INJECTION INTRAVENOUS at 22:02

## 2019-05-01 RX ADMIN — Medication 2: at 08:37

## 2019-05-01 NOTE — PROGRESS NOTE ADULT - ASSESSMENT
60yo F HD3 with stage IV endometrial adenocarcinoma s/p staging surgery '18 and 1 round of chemotherapy 2/19 readmitted from [facility] with fever, previously admitted for management of symptomatic rectovaginal and enterovaginal fistulas. Presents on referral from [facility] with urosepsis.  Rapid response called due to worsening respiratory status and mental status, pt intubated and transferred to MICU.    1. Neuro: intubated/sedated.    CIDP s/p IVIG, next due on 5/9, also post polio contributing to weakness  2. Pulm: intubated VC/AC   3. Cardio: HD stable, MAP goal >65   4. FEN/GI: NPO/dobhoff placed today  5. : joseph in place. suspect urosepsis.   - CT abdomen/pelvis w/ IV and oral contrast performed 3/15 showed improved enterovaginal fistula, no contrast in vagina, repeat CT on 4/5 showed no contrast in vagina again   6. ID: meropenem, s/p 1 dose of vanc today  7. Endocrine: FS, ISS, metformin for improved mortality when taking PO  8. VTE prophylaxis - SCDs, Lovenox 40 daily   9. GYN malignancy  -stopped anastrazole and initiated 3 weeks of megace 80mg BID followed by 3 weeks of tamoxifen. started megace and metformin 4/10. due to start tamoxifen today 5/1 but will delay until pt is taking po.  10. Derm: monitor sacrum for s/s of pressure ulcer. encourage frequent changes in position and OOB during  the day; will apply moisturizing cream to dry skin on right heal and provide padding in form of pillow under heal for additional comfort  11. social work/dispo planning.

## 2019-05-01 NOTE — PROGRESS NOTE ADULT - SUBJECTIVE AND OBJECTIVE BOX
GYN Progress Note    Patient seen at bedside.  intubated/sedated/on pressors  responsive  afebrile overnight - vanc dose held    Vital Signs Last 24 Hrs  T(C): 36.9 (01 May 2019 06:03), Max: 38.1 (2019 12:10)  T(F): 98.4 (01 May 2019 06:03), Max: 100.5 (2019 12:10)  HR: 77 (01 May 2019 08:42) (70 - 131)  BP: 96/68 (01 May 2019 08:00) (74/48 - 151/81)  BP(mean): 79 (01 May 2019 08:00) (60 - 185)  RR: 17 (01 May 2019 08:00) (12 - 25)  SpO2: 100% (01 May 2019 08:42) (94% - 100%)    Physical Exam:  Gen: sedated  Pulm: intubated  GI: soft, appropriately nontender, nondistended, ostomy with output  Ext: no edema or pain    I&O's Summary    2019 07:  -  01 May 2019 07:00  --------------------------------------------------------  IN: 1399.8 mL / OUT: 1390 mL / NET: 9.8 mL    01 May 2019 07:  -  01 May 2019 08:52  --------------------------------------------------------  IN: 117 mL / OUT: 25 mL / NET: 92 mL      MEDICATIONS  (STANDING):  chlorhexidine 2% Cloths 1 Application(s) Topical daily  chlorhexidine 4% Liquid 1 Application(s) Topical <User Schedule>  dextrose 5%. 1000 milliLiter(s) (50 mL/Hr) IV Continuous <Continuous>  dextrose 50% Injectable 12.5 Gram(s) IV Push once  dextrose 50% Injectable 25 Gram(s) IV Push once  dextrose 50% Injectable 25 Gram(s) IV Push once  enoxaparin Injectable 40 milliGRAM(s) SubCutaneous every 24 hours  insulin lispro (HumaLOG) corrective regimen sliding scale   SubCutaneous Before meals and at bedtime  magnesium sulfate  IVPB 2 Gram(s) IV Intermittent once  meropenem  IVPB 1000 milliGRAM(s) IV Intermittent every 8 hours  meropenem  IVPB      norepinephrine Infusion 0.05 MICROgram(s)/kG/Min (5.016 mL/Hr) IV Continuous <Continuous>  pantoprazole  Injectable 40 milliGRAM(s) IV Push every 24 hours  petrolatum Ophthalmic Ointment 1 Application(s) Both EYES three times a day  potassium phosphate IVPB 15 milliMole(s) IV Intermittent once  propofol Infusion 5 MICROgram(s)/kG/Min (1.605 mL/Hr) IV Continuous <Continuous>  sodium chloride 0.9%. 1000 milliLiter(s) (111 mL/Hr) IV Continuous <Continuous>  vancomycin  IVPB 750 milliGRAM(s) IV Intermittent once  zinc oxide 20% Ointment 1 Application(s) Topical three times a day    MEDICATIONS  (PRN):  dextrose 40% Gel 15 Gram(s) Oral once PRN Blood Glucose LESS THAN 70 milliGRAM(s)/deciliter  glucagon  Injectable 1 milliGRAM(s) IntraMuscular once PRN Glucose LESS THAN 70 milligrams/deciliter  morphine  - Injectable 2 milliGRAM(s) IV Push every 4 hours PRN Severe Pain (7 - 10)  ondansetron Injectable 8 milliGRAM(s) IV Push every 8 hours PRN Nausea and/or Vomiting      LABS:                        9.3    15.04 )-----------( 233      ( 01 May 2019 01:48 )             31.0         137  |  108  |  28<H>  ----------------------------<  147<H>  4.2   |  16<L>  |  0.80    Ca    8.6      01 May 2019 01:48  Phos  2.3     05-  Mg     1.6         TPro  6.5  /  Alb  2.1<L>  /  TBili  0.2  /  DBili  x   /  AST  26  /  ALT  29  /  AlkPhos  117  05-    PT/INR - ( 2019 21:54 )   PT: 12.8 sec;   INR: 1.13          PTT - ( 2019 21:54 )  PTT:27.3 sec  Urinalysis Basic - ( 2019 22:03 )    Color: Yellow / Appearance: Clear / S.020 / pH: x  Gluc: x / Ketone: NEGATIVE  / Bili: Negative / Urobili: 0.2 E.U./dL   Blood: x / Protein: 100 mg/dL / Nitrite: POSITIVE   Leuk Esterase: Small / RBC: < 5 /HPF / WBC Many /HPF   Sq Epi: x / Non Sq Epi: 5-10 /HPF / Bacteria: Many /HPF

## 2019-05-01 NOTE — DIETITIAN INITIAL EVALUATION ADULT. - OTHER INFO
58 yo/female with PMHx stage IV endometrial cancer, multiple admissions for management of enterovaginal fistula, UTIs, presented from [facility] with urosepsis. Pt intubated 2/2 acute hypoxic respiratory failure. Pt seen in room and discussed during rounds. Pt known to this RD from previous admits. Pt currently intubated on CPAP trial, not planned for extubation today. Not sedated, awake, alert, able to nod yes/no to questions. MAP 62, not on pressors at this time. Pt reports fair/poor intake since last dc from [facility]. No current complaints of N/V; colostomy noted w/hard stool. Pt denies pain at this time. NKFA. Skin noted with stage II pressure injury to back. Will start TF via NGT today per team. Will continue to follow per RD protocol.

## 2019-05-01 NOTE — DIETITIAN INITIAL EVALUATION ADULT. - +GENDER
Tell her all her labs are ok except her TG being high and she needs to reduce carbs as we had discussed. Also her thyroid is borderline low in function and if she feels symptoms of low thyroid (low appetite with weight gain, constipation, cold intolerance, or mental slowness) she should let me know. Otherwise we should keep on rechecking it. Statement Selected

## 2019-05-01 NOTE — PROGRESS NOTE ADULT - ASSESSMENT
Assessment and Plan: 59 F w/ Stage IV Endometrial Carcinoma s/p staging surgery 7/2018 and 1 cycle of chemo in 2/2019, DM, low anterior resection mobilization of splenic flexure, end colostomy w/ rectovaginal fistula w/ numerous admissions for urinary tract infection and bacteremia w/ acute hypoxic respiratory failure and AMS in the setting of urosepsis    Neurology:  #Metabolic encephalopathy  Pt w/ acute change in mental status likely in the setting of sepsis and acidosis. Currently on sedation with propofol  - S/p intubation; will continue w/ sedation and MV    Pulmonary:  #Acute hypoxic respiratory failure  Pt w/ changes in mentation in the setting of sepsis. S/p intubation and MV  - C/w VC/AC   - Will monitor and adjust vent as needed  - Daily sedation holiday and CPAP trial  - Treat underlying sepsis as below    Cardiology:  Septic shock 2/2 urosepsis  Pt w/ T-100.5, HR-128, RR-22, leukocytosis to 17. Requiring levophed  - S/p initiation of abx; vancomycin, meropenem and one dose of amikacin  - Lactate not elevated  - S/p 2L fluid resuscitation (>30cc/kg); reperfusion exam normal  - C/w levophed to titrate to MAP>65  - F/u blood and urine cultures  - F/u sputum culture  - If pt w/ positive blood cultures, will need to remove chemoport    GI:  #Ostomy  Pt w/ hx of enterovaginal fistula w/ ostomy bag in place  - Continue to monitor ostomy output    #R/o SBO  Pt w/ distended abdomen w/ reduced bowel sounds. Abdominal xray showing large amount of stool however no evidence of obstruction. Ostomy bag w/ hardened stool  - Will give bowel regimen to relieve hardened stools  - Continue to monitor for signs/symptoms of obstruction    :  #Urosepsis  Pt w/ hx of indwelling joseph w/ recurrent UTI on ertapenem as outpatient for ESBL and proteus mirabilis in urine and blood  - Will c/w vanc/meropenem; s/p one dose of amikacin   - F/u ID recommendations  - Continue management of sepsis as above    #Indwelling joseph  Pt w/ hx if indwelling joseph catheter. Was recently replaced within the past week   - C/w joseph  - Will call urology to assess need for joseph replacement    #Endometrial CA  Pt follows w/ GYN. F/u GYN recs    Renal:  #Mixed acid base disorder  Mixed primary respiratory acidosis w/ non-anion gap metabolic acidosis. Most recent ABG on VC/AC 50/400/17/5 7.25/45/162/19. Patient currently breathing with the vent  - Will increase RR to 17  - Check repeat ABG in 30 minutes  - Continue to monitor    MSK:  No active issues    Endocrine:  #DM  - F/u A1c  - FS/MISS     ID:  #Septic shock 2/2 UTI  - C/w meropenem and vancomycin for now  - S/p 1 dose of amikacin  - F/u ID recs  - Rest of plan as above    FEN: NPO, Replete lytes PRN K>4, Mg>2    PPx: Lovenox, SCDs    Code: FULL CODE    Dispo: Patient requires continued monitoring in MICU  Critical care time rendered 60 minutes Assessment and Plan: 59 F w/ Stage IV Endometrial Carcinoma s/p staging surgery 7/2018 and 1 cycle of chemo in 2/2019, DM, low anterior resection mobilization of splenic flexure, end colostomy w/ rectovaginal fistula w/ numerous admissions for urinary tract infection and bacteremia w/ acute hypoxic respiratory failure and AMS in the setting of urosepsis.    Neurology:  #Metabolic encephalopathy  Pt w/ acute change in mental status likely in the setting of sepsis and acidosis. Now improving, responding appropriately w nodding and shaking of head.  - wean sedation and extubate as tolerated.    Pulmonary:  #Acute hypoxic respiratory failure  Pt w/ changes in mentation in the setting of sepsis. Now intubated  - C/w VC/AC   - Will monitor and adjust vent as needed  - Daily sedation holiday and CPAP trial - failed CPAP on 5/1 am as became tachypneic and tachycardic, however possibly component of ET tube being too low.  - Treat underlying sepsis as below    Cardiology:  #Septic shock 2/2 urosepsis  Resolved   Initially requiring levophed, now weaned off on 5/1 am  - S/p initiation of abx; vancomycin, meropenem and one dose of amikacin  - Lactate not elevated  - S/p 2L fluid resuscitation (>30cc/kg); reperfusion exam normal  - blood and urine cultures from 4/30 growing Ecoli (not MDR e coli) - awaiting sensitivities  - F/u sputum culture  - will send surveillance blood cx 5/1  - will give abx through chemoport    #volume status  - high output state 2/2 to sepsis. warm, well perfused, mentating appropriately w good urine output  - currently euvolemic  - d/c IV NS    GI:  #Ostomy  Pt w/ hx of enterovaginal fistula w/ ostomy bag in place  - Continue to monitor ostomy output  - continue bowel regimen      :  #sepsis 2/2 to UTI  Pt w/ hx of indwelling joseph w/ recurrent UTI on ertapenem as outpatient for ESBL and proteus mirabilis in urine and blood  - Will c/w vanc/meropenem; s/p one dose of amikacin   - F/u ID recommendations  - Continue management of sepsis as above  - urine growing e coli - pending sensitivities    #Indwelling joseph  Pt w/ hx of indwelling joseph catheter. Was recently replaced within the past week   - C/w joseph    #Endometrial CA  Pt follows w/ GYN. F/u GYN recs  - no planned chemo or intervention at this time    Renal:  #non anion gap metabolic acidosis  - likely 2/2 to normal saline administration  - d/c NS as patient receiving enough fluids through abx and other gtts    MSK:  No active issues    Endocrine:  #DM  - 4.8 A1c  - FS/MISS     ID:  #Septic shock 2/2 UTI  - C/w meropenem and vancomycin for now  - S/p 1 dose of amikacin  - F/u ID recs  - Rest of plan as above    FEN: NPO w tube feeds, Replete lytes PRN K>4, Mg>2    PPx: Lovenox, SCDs    Code: FULL CODE    Dispo: Patient requires continued monitoring in MICU Assessment and Plan: 59 F w/ Stage IV Endometrial Carcinoma s/p staging surgery 7/2018 and 1 cycle of chemo in 2/2019, DM, low anterior resection mobilization of splenic flexure, end colostomy w/ rectovaginal fistula w/ numerous admissions for urinary tract infection and bacteremia w/ acute hypoxic respiratory failure and AMS in the setting of urosepsis.    Neurology:  #Metabolic encephalopathy  Pt w/ acute change in mental status likely in the setting of sepsis and acidosis. Now improving, responding appropriately w nodding and shaking of head.  - wean sedation and extubate as tolerated.    Pulmonary:  #Acute hypoxic respiratory failure  Pt w/ changes in mentation in the setting of sepsis. Now intubated  - C/w VC/AC   - Will monitor and adjust vent as needed  - Daily sedation holiday and CPAP trial - failed CPAP on 5/1 am as became tachypneic and tachycardic, however possibly component of ET tube being too low.  - Treat underlying sepsis as below    Cardiology:  #Septic shock 2/2 urosepsis  Resolved   Initially requiring levophed, now weaned off on 5/1 am  - S/p initiation of abx; vancomycin, meropenem and one dose of amikacin  - Lactate not elevated  - S/p 2L fluid resuscitation (>30cc/kg); reperfusion exam normal  - blood and urine cultures from 4/30 growing Ecoli (not MDR e coli) - awaiting sensitivities  - F/u sputum culture  - will send surveillance blood cx 5/1  - will give abx through chemoport    #volume status  - high output state 2/2 to sepsis. warm, well perfused, mentating appropriately w good urine output  - currently euvolemic  - d/c IV NS    GI:  #Ostomy  Pt w/ hx of enterovaginal fistula w/ ostomy bag in place  - Continue to monitor ostomy output  - continue bowel regimen      :  #sepsis 2/2 to UTI  Pt w/ hx of indwelling joseph w/ recurrent UTI on ertapenem as outpatient for ESBL and proteus mirabilis in urine and blood  - Will c/w vanc/meropenem; s/p one dose of amikacin   - F/u ID recommendations  - Continue management of sepsis as above  - urine growing e coli - pending sensitivities    #Indwelling joseph  Pt w/ hx of indwelling joseph catheter. Was recently replaced within the past week   - C/w joseph    #Endometrial CA  Pt follows w/ GYN. F/u GYN recs  - no planned chemo or intervention at this time    Renal:  #non anion gap metabolic acidosis  - likely 2/2 to normal saline administration  - d/c NS as patient receiving enough fluids through abx and other gtts    MSK:  No active issues    Endocrine:  #DM  - 4.8 A1c  - FS/MISS     ID:  #Septic shock 2/2 UTI  - C/w meropenem and vancomycin for now  - S/p 1 dose of amikacin  - F/u ID recs  - Rest of plan as above    FEN: NPO w tube feeds, Replete lytes PRN K>4, Mg>2    PPx: Lovenox, SCDs    Code: FULL CODE    Dispo: Patient requires continued monitoring in MICU  Critical care time rendered 50 minutes

## 2019-05-01 NOTE — DIETITIAN INITIAL EVALUATION ADULT. - NS AS NUTRI INTERV ENTERAL NUTRITION
Composition/Concentration/Rate/Schedule/Feeding tube flush/Route/Insert enteral feeding tube/Site care/Recommend starting Osmolite 1.2 Bola @ 44mL/hr x 24hrs plus 1 ProStat via NGT. Provides: 1056mL TV, 1367kcal, 74g protein, 866mL free H2O, 106% RDI, 1.55g/kg IBW protein, 22.5 non pro kcal. Recommend starting at 20mL/hr and advancing by [id number] to goal as tolerated. Additional free H2O per team, RD recommends ~[id number]. Monitor for s/s intolerance; maintain aspiration precautions at all times./Volume

## 2019-05-01 NOTE — DIETITIAN INITIAL EVALUATION ADULT. - ENERGY NEEDS
Height 61"; .5#; #; 122%IBW  Ideal body weight used for calculations as pt >120% of IBW. Needs estimated for vent, wound healing, oncology care

## 2019-05-01 NOTE — PROGRESS NOTE ADULT - SUBJECTIVE AND OBJECTIVE BOX
INTERVAL HPI/OVERNIGHT EVENTS:  Patient off pressors this AM, awake and Alert. Bcx from admission growing E. Coli, Ucx growing Gr neg rods.    CONSTITUTIONAL:  Negative fever or chills,  EYES:  Negative  blurry vision or double vision  CARDIOVASCULAR:  Negative for chest pain or palpitations  GASTROINTESTINAL:  Negative for nausea, or abdominal pain  NEUROLOGIC:  No headache, confusion, dizziness, lightheadedness      ANTIBIOTICS/RELEVANT:    MEDICATIONS  (STANDING):  chlorhexidine 0.12% Liquid 15 milliLiter(s) Oral Mucosa every 12 hours  chlorhexidine 2% Cloths 1 Application(s) Topical daily  dextrose 5%. 1000 milliLiter(s) (50 mL/Hr) IV Continuous <Continuous>  dextrose 50% Injectable 12.5 Gram(s) IV Push once  dextrose 50% Injectable 25 Gram(s) IV Push once  dextrose 50% Injectable 25 Gram(s) IV Push once  enoxaparin Injectable 40 milliGRAM(s) SubCutaneous every 24 hours  insulin lispro (HumaLOG) corrective regimen sliding scale   SubCutaneous every 6 hours  meropenem  IVPB 1000 milliGRAM(s) IV Intermittent every 8 hours  meropenem  IVPB      norepinephrine Infusion 0.05 MICROgram(s)/kG/Min (5.016 mL/Hr) IV Continuous <Continuous>  pantoprazole  Injectable 40 milliGRAM(s) IV Push every 24 hours  petrolatum Ophthalmic Ointment 1 Application(s) Both EYES three times a day  propofol Infusion 5 MICROgram(s)/kG/Min (1.605 mL/Hr) IV Continuous <Continuous>  zinc oxide 20% Ointment 1 Application(s) Topical three times a day    MEDICATIONS  (PRN):  dextrose 40% Gel 15 Gram(s) Oral once PRN Blood Glucose LESS THAN 70 milliGRAM(s)/deciliter  glucagon  Injectable 1 milliGRAM(s) IntraMuscular once PRN Glucose LESS THAN 70 milligrams/deciliter  morphine  - Injectable 2 milliGRAM(s) IV Push every 4 hours PRN Severe Pain (7 - 10)  ondansetron Injectable 8 milliGRAM(s) IV Push every 8 hours PRN Nausea and/or Vomiting        Vital Signs Last 24 Hrs  T(C): 37.7 (01 May 2019 17:52), Max: 38 (2019 20:20)  T(F): 99.8 (01 May 2019 17:52), Max: 100.4 (2019 20:20)  HR: 76 (01 May 2019 18:00) (70 - 106)  BP: 91/54 (01 May 2019 18:00) (65/47 - 148/95)  BP(mean): 69 (01 May 2019 18:00) (55 - 185)  RR: 17 (01 May 2019 18:00) (12 - 22)  SpO2: 100% (01 May 2019 18:00) (96% - 100%)    PHYSICAL EXAM:  Constitutional: Well-developed, well nourished, intubated  Eyes:THOMAS, EOMI  Ear/Nose/Throat: no oral lesion, no sinus tenderness on percussion	  Neck:no JVD, no lymphadenopathy, supple  Respiratory: Mechanical breath sounds CTA heath  Cardiovascular: S1S2 RRR, no murmurs  Gastrointestinal:soft, (+) BS, no HSM   - joseph in place  Extremities:no e/e/c  Vascular: DP Pulse: right normal; left normal      LABS:                        9.3    15.04 )-----------( 233      ( 01 May 2019 01:48 )             31.0     05-    137  |  108  |  28<H>  ----------------------------<  147<H>  4.2   |  16<L>  |  0.80    Ca    8.6      01 May 2019 01:48  Phos  2.3     05-  Mg     1.6         TPro  6.5  /  Alb  2.1<L>  /  TBili  0.2  /  DBili  x   /  AST  26  /  ALT  29  /  AlkPhos  117  05-01    PT/INR - ( 2019 21:54 )   PT: 12.8 sec;   INR: 1.13          PTT - ( 2019 21:54 )  PTT:27.3 sec  Urinalysis Basic - ( 2019 22:03 )    Color: Yellow / Appearance: Clear / S.020 / pH: x  Gluc: x / Ketone: NEGATIVE  / Bili: Negative / Urobili: 0.2 E.U./dL   Blood: x / Protein: 100 mg/dL / Nitrite: POSITIVE   Leuk Esterase: Small / RBC: < 5 /HPF / WBC Many /HPF   Sq Epi: x / Non Sq Epi: 5-10 /HPF / Bacteria: Many /HPF        MICROBIOLOGY:    Culture - Blood (collected 2019 16:14)  Source: .Blood Blood-Peripheral  Preliminary Report (01 May 2019 17:00):    No growth at 1 day.    Culture - Blood (collected 2019 16:14)  Source: .Blood Blood-Peripheral  Preliminary Report (01 May 2019 17:00):    No growth at 1 day.    Culture - Blood (collected 2019 12:22)  Source: .Blood Blood-Venous  Gram Stain (2019 20:58):    Aerobic and Anaerobic Bottle: Gram Negative Rods    Result called to and read back by_ Ms. SHIRA Carlson RN([id number]) 2019    20:57:59  Preliminary Report (01 May 2019 14:58):    Growth in aerobic and anaerobic bottles: Escherichia coli    Susceptibility to follow.  Organism: Blood Culture PCR (01 May 2019 14:18)  Organism: Blood Culture PCR (01 May 2019 14:18)    Culture - Blood (collected 2019 12:22)  Source: .Blood Blood-Venous  Gram Stain (01 May 2019 08:52):    Anaerobic Bottle: Gram Negative Rods    Result called to and read back by_ MsSylvia Camargo RN(ER) 2019    17:41:12    Aerobic Bottle: Gram Negative Rods  Preliminary Report (01 May 2019 14:56):    Growth in anaerobic bottle: Escherichia coli    Susceptibility to follow.    Culture - Urine (collected 2019 09:31)  Source: .Urine Clean Catch (Midstream)  Preliminary Report (01 May 2019 09:38):    >100,000 CFU/ml Gram Negative Rods    Identification and susceptibility to follow.      RADIOLOGY & ADDITIONAL STUDIES:

## 2019-05-01 NOTE — PROGRESS NOTE ADULT - ATTENDING COMMENTS
I have reviewed the medical record, including laboratory and radiographic studies, examined the patient and discussed the plan with Dr. Hernández, the ID Resident.  Agree with above. Will continue to follow with you – ID Team 1.  Dr. Lawler will assume care tomorrow.

## 2019-05-01 NOTE — PROGRESS NOTE ADULT - ASSESSMENT
60yo HD3 with known stage IV endometrial carcinoma s/p staging surgery 7/2018 and 1 cycle of chemo in 2/2019 followed by multiple admissions for management of symptomatic enterovaginal fistula, complex fistula associated with urinary tract infection treated with NPO/TPN, now symptomatically resolved, bacteremia presents on referral from [facility] with urosepsis.  Rapid response called due to worsening respiratory status and mental status, pt intubated and transferred to MICU.  1. NEURO: intubated/sedated, keep MAP >65 60yo HD3 with known stage IV endometrial carcinoma s/p staging surgery 7/2018 and 1 cycle of chemo in 2/2019 followed by multiple admissions for management of symptomatic enterovaginal fistula, complex fistula associated with urinary tract infection treated with NPO/TPN, now symptomatically resolved, bacteremia presents on referral from [facility] with urosepsis.  Rapid response called due to worsening respiratory status and mental status, pt intubated and transferred to MICU.  1. NEURO: intubated/sedated, keep MAP >65  2. FEN: NS@111cc/hr, NPO, OGT in place  3. ID: f/u blood cx, on vanc/meropenem/1 dose of amikacin; if blood cx in place, will need to remove chemoport  4. GI: ostomy, bowel regimens for hardened stool  5: : indwelling joseph w/ recurrent UTI s/p ertapenem for ESBL and proteus  6. ISS  7. Lovenox    MGMT FOR MICU  GYN ONC FOLLOWING

## 2019-05-01 NOTE — PROGRESS NOTE ADULT - ASSESSMENT
Patient is a 59 F w/ Stage IV Endometrial Carcinoma s/p staging surgery 7/2018 and 1 cycle of chemo in 2/2019, DM, low anterior resection mobilization of splenic flexure, end colostomy w/ rectovaginal fistula (reported to be healed) w/ numerous admissions for urinary tract infection and bacteremia who presents from [facility] with fever and n/v, who developed septic shock s/p PATRICA, source likely from complicated UTI.     #Bacteremia  Patient with history of multiple presentations with bacteremia 2/2 rectovaginal fistula and neurogenic bladder with chronic indwelling joseph. History of MRSA (cultured from PICC line) and ESBL Proteus bacteremia (from UTI) on previous admissions, and completed a 14d course of Ertapenem and 3d of vancomycin. Now presenting with septic shock, found to have Gr negative bacteremia with source likely from complicated UTI. CT abd/pelvis with signs of pyelonephritis ureteritis and cystitis. Would recommend continuing broad spectrum antibiotics for now given history of drug resistant infections and narrow coverage based on culture results, recommend:  -Blood Cx growing E. Coli, new pathogen from previous bacteremia. Will follow for sensitivities  -Ucx >100k CFR gram neg rods  -Would continue vancomycin for now dosed by level. Would send level 12 h after 1st dose and redose if <20  -Continue Meropenem 1g Q8h for now pending sensitivities and any further growth in cultures  -will continue to follow- ID Team 1

## 2019-05-01 NOTE — PROGRESS NOTE ADULT - SUBJECTIVE AND OBJECTIVE BOX
INCOMPLETE NOTE    INTERVAL HPI/OVERNIGHT EVENTS:    OVERNIGHT: No overnight events.  SUBJECTIVE: Patient seen and examined at bedside.     ROS:  CV: Denies chest pain  Resp: Denies SOB  GI: Denies abdominal pain, constipation, diarrhea, nausea, vomiting  : Denies dysuria  ID: Denies fevers, chills  MSK: Denies joint pain     OBJECTIVE:    VITAL SIGNS:  ICU Vital Signs Last 24 Hrs  T(C): 37 (01 May 2019 01:03), Max: 38.1 (2019 12:10)  T(F): 98.6 (01 May 2019 01:03), Max: 100.5 (2019 12:10)  HR: 74 (01 May 2019 05:00) (73 - 131)  BP: 123/79 (01 May 2019 05:00) (74/48 - 151/81)  BP(mean): 103 (01 May 2019 05:00) (60 - 185)  ABP: --  ABP(mean): --  RR: 17 (01 May 2019 05:00) (12 - 25)  SpO2: 100% (01 May 2019 05:00) (94% - 100%)    Mode: AC/ CMV (Assist Control/ Continuous Mandatory Ventilation), RR (machine): 17, TV (machine): 400, FiO2: 50, PEEP: 5, ITime: 1, MAP: 11, PIP: 27    04-30 @ 07:01  -  05-01 @ 06:08  --------------------------------------------------------  IN: 977.6 mL / OUT: 1310 mL / NET: -332.4 mL      CAPILLARY BLOOD GLUCOSE      POCT Blood Glucose.: 125 mg/dL (2019 22:23)      PHYSICAL EXAM:  General: NAD, comfortable  HEENT: NCAT, PERRL, clear conjunctiva, no scleral icterus  Neck: supple, no JVD  Respiratory: CTA b/l, no wheezing, rhonchi, rales  Cardiovascular: RRR, normal S1S2, no M/R/G  Vascular: 2+ radial and DP pulses  Abdomen: soft, NT/ND, bowel sounds in all four quadrants, no palpable masses  Extremities: WWP, no clubbing, cyanosis, or edema  Skin: No rashes present  Neuro:     MEDICATIONS:  MEDICATIONS  (STANDING):  chlorhexidine 2% Cloths 1 Application(s) Topical daily  chlorhexidine 4% Liquid 1 Application(s) Topical <User Schedule>  dextrose 5%. 1000 milliLiter(s) (50 mL/Hr) IV Continuous <Continuous>  dextrose 50% Injectable 12.5 Gram(s) IV Push once  dextrose 50% Injectable 25 Gram(s) IV Push once  dextrose 50% Injectable 25 Gram(s) IV Push once  enoxaparin Injectable 40 milliGRAM(s) SubCutaneous every 24 hours  insulin lispro (HumaLOG) corrective regimen sliding scale   SubCutaneous Before meals and at bedtime  meropenem  IVPB 1000 milliGRAM(s) IV Intermittent every 8 hours  meropenem  IVPB      norepinephrine Infusion 0.05 MICROgram(s)/kG/Min (5.016 mL/Hr) IV Continuous <Continuous>  pantoprazole  Injectable 40 milliGRAM(s) IV Push every 24 hours  petrolatum Ophthalmic Ointment 1 Application(s) Both EYES three times a day  propofol Infusion 5 MICROgram(s)/kG/Min (1.605 mL/Hr) IV Continuous <Continuous>  sodium chloride 0.9%. 1000 milliLiter(s) (111 mL/Hr) IV Continuous <Continuous>  zinc oxide 20% Ointment 1 Application(s) Topical three times a day    MEDICATIONS  (PRN):  dextrose 40% Gel 15 Gram(s) Oral once PRN Blood Glucose LESS THAN 70 milliGRAM(s)/deciliter  glucagon  Injectable 1 milliGRAM(s) IntraMuscular once PRN Glucose LESS THAN 70 milligrams/deciliter  morphine  - Injectable 2 milliGRAM(s) IV Push every 4 hours PRN Severe Pain (7 - 10)  ondansetron Injectable 8 milliGRAM(s) IV Push every 8 hours PRN Nausea and/or Vomiting      ALLERGIES:  Allergies    No Known Allergies    Intolerances        LABS:                        9.3    15.04 )-----------( 233      ( 01 May 2019 01:48 )             31.0     05-01    137  |  108  |  28<H>  ----------------------------<  147<H>  4.2   |  16<L>  |  0.80    Ca    8.6      01 May 2019 01:48  Phos  2.3     -  Mg     1.6     -    TPro  6.5  /  Alb  2.1<L>  /  TBili  0.2  /  DBili  x   /  AST  26  /  ALT  29  /  AlkPhos  117  -    PT/INR - ( 2019 21:54 )   PT: 12.8 sec;   INR: 1.13          PTT - ( 2019 21:54 )  PTT:27.3 sec  Urinalysis Basic - ( 2019 22:03 )    Color: Yellow / Appearance: Clear / S.020 / pH: x  Gluc: x / Ketone: NEGATIVE  / Bili: Negative / Urobili: 0.2 E.U./dL   Blood: x / Protein: 100 mg/dL / Nitrite: POSITIVE   Leuk Esterase: Small / RBC: < 5 /HPF / WBC Many /HPF   Sq Epi: x / Non Sq Epi: 5-10 /HPF / Bacteria: Many /HPF        RADIOLOGY & ADDITIONAL TESTS: Reviewed. INTERVAL HPI/OVERNIGHT EVENTS: Vanc level 15 this am so dosed for vanc 750mg x1.    SUBJECTIVE: Patient seen and examined at bedside. Intubated, sedation held prior to examination. Patient responding to questions appropriately w head nodding and shaking. States she is thirsty.    ROS:  CV: Denies chest pain  Resp: Denies SOB  GI: Denies abdominal pain, constipation, diarrhea, nausea, vomiting  : Denies dysuria  ID: Denies fevers, chills  MSK: Denies joint pain     OBJECTIVE:    VITAL SIGNS:  ICU Vital Signs Last 24 Hrs  T(C): 37 (01 May 2019 01:03), Max: 38.1 (2019 12:10)  T(F): 98.6 (01 May 2019 01:03), Max: 100.5 (2019 12:10)  HR: 74 (01 May 2019 05:00) (73 - 131)  BP: 123/79 (01 May 2019 05:00) (74/48 - 151/81)  BP(mean): 103 (01 May 2019 05:00) (60 - 185)  ABP: --  ABP(mean): --  RR: 17 (01 May 2019 05:00) (12 - 25)  SpO2: 100% (01 May 2019 05:00) (94% - 100%)    Mode: AC/ CMV (Assist Control/ Continuous Mandatory Ventilation), RR (machine): 17, TV (machine): 400, FiO2: 50, PEEP: 5, ITime: 1, MAP: 11, PIP: 27    04-30 @ 07:01  -  05-01 @ 06:08  --------------------------------------------------------  IN: 977.6 mL / OUT: 1310 mL / NET: -332.4 mL      CAPILLARY BLOOD GLUCOSE      POCT Blood Glucose.: 125 mg/dL (2019 22:23)      PHYSICAL EXAM:  General: elderly female in NAD, comfortable intubated  HEENT: NCAT, PERRL, clear conjunctiva, no scleral icterus, intubated  Neck: supple, no JVD  Respiratory: CTA b/l, no wheezing, rhonchi, rales, appreciated sounds from ventilator throughout  Cardiovascular: RRR, normal S1S2, no M/R/G  Vascular: 2+ radial and DP pulses  Abdomen: soft, NT/ND, bowel sounds present, midline scar well healed, Colostomy bag on L side w hard brown stool.  Extremities: WWP, no clubbing, cyanosis, or edema  Skin: No rashes present  Neuro: Intubated and restrained, but awake, alert, answers questions appropriately w yes and no    MEDICATIONS:  MEDICATIONS  (STANDING):  chlorhexidine 2% Cloths 1 Application(s) Topical daily  chlorhexidine 4% Liquid 1 Application(s) Topical <User Schedule>  dextrose 5%. 1000 milliLiter(s) (50 mL/Hr) IV Continuous <Continuous>  dextrose 50% Injectable 12.5 Gram(s) IV Push once  dextrose 50% Injectable 25 Gram(s) IV Push once  dextrose 50% Injectable 25 Gram(s) IV Push once  enoxaparin Injectable 40 milliGRAM(s) SubCutaneous every 24 hours  insulin lispro (HumaLOG) corrective regimen sliding scale   SubCutaneous Before meals and at bedtime  meropenem  IVPB 1000 milliGRAM(s) IV Intermittent every 8 hours  meropenem  IVPB      norepinephrine Infusion 0.05 MICROgram(s)/kG/Min (5.016 mL/Hr) IV Continuous <Continuous>  pantoprazole  Injectable 40 milliGRAM(s) IV Push every 24 hours  petrolatum Ophthalmic Ointment 1 Application(s) Both EYES three times a day  propofol Infusion 5 MICROgram(s)/kG/Min (1.605 mL/Hr) IV Continuous <Continuous>  sodium chloride 0.9%. 1000 milliLiter(s) (111 mL/Hr) IV Continuous <Continuous>  zinc oxide 20% Ointment 1 Application(s) Topical three times a day    MEDICATIONS  (PRN):  dextrose 40% Gel 15 Gram(s) Oral once PRN Blood Glucose LESS THAN 70 milliGRAM(s)/deciliter  glucagon  Injectable 1 milliGRAM(s) IntraMuscular once PRN Glucose LESS THAN 70 milligrams/deciliter  morphine  - Injectable 2 milliGRAM(s) IV Push every 4 hours PRN Severe Pain (7 - 10)  ondansetron Injectable 8 milliGRAM(s) IV Push every 8 hours PRN Nausea and/or Vomiting      ALLERGIES:  Allergies    No Known Allergies    Intolerances        LABS:                        9.3    15.04 )-----------( 233      ( 01 May 2019 01:48 )             31.0         137  |  108  |  28<H>  ----------------------------<  147<H>  4.2   |  16<L>  |  0.80    Ca    8.6      01 May 2019 01:48  Phos  2.3       Mg     1.6         TPro  6.5  /  Alb  2.1<L>  /  TBili  0.2  /  DBili  x   /  AST  26  /  ALT  29  /  AlkPhos  117      PT/INR - ( 2019 21:54 )   PT: 12.8 sec;   INR: 1.13          PTT - ( 2019 21:54 )  PTT:27.3 sec  Urinalysis Basic - ( 2019 22:03 )    Color: Yellow / Appearance: Clear / S.020 / pH: x  Gluc: x / Ketone: NEGATIVE  / Bili: Negative / Urobili: 0.2 E.U./dL   Blood: x / Protein: 100 mg/dL / Nitrite: POSITIVE   Leuk Esterase: Small / RBC: < 5 /HPF / WBC Many /HPF   Sq Epi: x / Non Sq Epi: 5-10 /HPF / Bacteria: Many /HPF        RADIOLOGY & ADDITIONAL TESTS: Reviewed.

## 2019-05-01 NOTE — PROGRESS NOTE ADULT - SUBJECTIVE AND OBJECTIVE BOX
GYN Progress Note    Patient seen at bedside.  she is intubated/responsive.  going down on pressors.  OGT removed and dobhoff placed.  stopped NS due to hyperchloremic acidosis  afebrile today  received 750mg vanc today  on meropenem    Denies CP, palpitations, SOB, fever, chills, nausea, vomiting.    Vital Signs Last 24 Hrs  T(C): 37.7 (01 May 2019 17:52), Max: 38 (2019 20:20)  T(F): 99.8 (01 May 2019 17:52), Max: 100.4 (2019 20:20)  HR: 80 (01 May 2019 17:00) (70 - 106)  BP: 65/47 (01 May 2019 17:00) (65/47 - 148/95)  BP(mean): 55 (01 May 2019 17:00) (55 - 185)  RR: 22 (01 May 2019 17:00) (12 - 22)  SpO2: 100% (01 May 2019 17:00) (96% - 100%)    Physical Exam:  Gen: intubated/sedated, responsive  Pulm: intubated  GI: soft, appropriately nontender, nondistended, stool in ostomy  Ext: SCDs in place, wnl    I&O's Summary    2019 07:  -  01 May 2019 07:00  --------------------------------------------------------  IN: 1399.8 mL / OUT: 1390 mL / NET: 9.8 mL    01 May 2019 07:  -  01 May 2019 17:57  --------------------------------------------------------  IN: 1020 mL / OUT: 480 mL / NET: 540 mL      MEDICATIONS  (STANDING):  chlorhexidine 0.12% Liquid 15 milliLiter(s) Oral Mucosa every 12 hours  chlorhexidine 2% Cloths 1 Application(s) Topical daily  dextrose 5%. 1000 milliLiter(s) (50 mL/Hr) IV Continuous <Continuous>  dextrose 50% Injectable 12.5 Gram(s) IV Push once  dextrose 50% Injectable 25 Gram(s) IV Push once  dextrose 50% Injectable 25 Gram(s) IV Push once  enoxaparin Injectable 40 milliGRAM(s) SubCutaneous every 24 hours  insulin lispro (HumaLOG) corrective regimen sliding scale   SubCutaneous every 6 hours  meropenem  IVPB 1000 milliGRAM(s) IV Intermittent every 8 hours  meropenem  IVPB      norepinephrine Infusion 0.05 MICROgram(s)/kG/Min (5.016 mL/Hr) IV Continuous <Continuous>  pantoprazole  Injectable 40 milliGRAM(s) IV Push every 24 hours  petrolatum Ophthalmic Ointment 1 Application(s) Both EYES three times a day  propofol Infusion 5 MICROgram(s)/kG/Min (1.605 mL/Hr) IV Continuous <Continuous>  zinc oxide 20% Ointment 1 Application(s) Topical three times a day    MEDICATIONS  (PRN):  dextrose 40% Gel 15 Gram(s) Oral once PRN Blood Glucose LESS THAN 70 milliGRAM(s)/deciliter  glucagon  Injectable 1 milliGRAM(s) IntraMuscular once PRN Glucose LESS THAN 70 milligrams/deciliter  morphine  - Injectable 2 milliGRAM(s) IV Push every 4 hours PRN Severe Pain (7 - 10)  ondansetron Injectable 8 milliGRAM(s) IV Push every 8 hours PRN Nausea and/or Vomiting      LABS:                        9.3    15.04 )-----------( 233      ( 01 May 2019 01:48 )             31.0     05-    137  |  108  |  28<H>  ----------------------------<  147<H>  4.2   |  16<L>  |  0.80    Ca    8.6      01 May 2019 01:48  Phos  2.3     05-  Mg     1.6         TPro  6.5  /  Alb  2.1<L>  /  TBili  0.2  /  DBili  x   /  AST  26  /  ALT  29  /  AlkPhos  117  05-    PT/INR - ( 2019 21:54 )   PT: 12.8 sec;   INR: 1.13          PTT - ( 2019 21:54 )  PTT:27.3 sec  Urinalysis Basic - ( 2019 22:03 )    Color: Yellow / Appearance: Clear / S.020 / pH: x  Gluc: x / Ketone: NEGATIVE  / Bili: Negative / Urobili: 0.2 E.U./dL   Blood: x / Protein: 100 mg/dL / Nitrite: POSITIVE   Leuk Esterase: Small / RBC: < 5 /HPF / WBC Many /HPF   Sq Epi: x / Non Sq Epi: 5-10 /HPF / Bacteria: Many /HPF

## 2019-05-02 LAB
-  AMIKACIN: SIGNIFICANT CHANGE UP
-  AMPICILLIN/SULBACTAM: SIGNIFICANT CHANGE UP
-  AMPICILLIN: SIGNIFICANT CHANGE UP
-  AZTREONAM: SIGNIFICANT CHANGE UP
-  CEFAZOLIN: SIGNIFICANT CHANGE UP
-  CEFEPIME: SIGNIFICANT CHANGE UP
-  CEFOTAXIME: SIGNIFICANT CHANGE UP
-  CEFOXITIN: SIGNIFICANT CHANGE UP
-  CEFTAZIDIME: SIGNIFICANT CHANGE UP
-  CEFTRIAXONE: SIGNIFICANT CHANGE UP
-  CEFUROXIME: SIGNIFICANT CHANGE UP
-  CIPROFLOXACIN: SIGNIFICANT CHANGE UP
-  CIPROFLOXACIN: SIGNIFICANT CHANGE UP
-  ERTAPENEM: SIGNIFICANT CHANGE UP
-  GENTAMICIN: SIGNIFICANT CHANGE UP
-  MEROPENEM: SIGNIFICANT CHANGE UP
-  MOXIFLOXACIN(AEROBIC): SIGNIFICANT CHANGE UP
-  NITROFURANTOIN: SIGNIFICANT CHANGE UP
-  PIPERACILLIN/TAZOBACTAM: SIGNIFICANT CHANGE UP
-  TETRACYCLINE: SIGNIFICANT CHANGE UP
-  TIGECYCLINE: SIGNIFICANT CHANGE UP
-  TOBRAMYCIN: SIGNIFICANT CHANGE UP
-  TRIMETHOPRIM/SULFAMETHOXAZOLE: SIGNIFICANT CHANGE UP
ANION GAP SERPL CALC-SCNC: 11 MMOL/L — SIGNIFICANT CHANGE UP (ref 5–17)
BUN SERPL-MCNC: 25 MG/DL — HIGH (ref 7–23)
CALCIUM SERPL-MCNC: 8.7 MG/DL — SIGNIFICANT CHANGE UP (ref 8.4–10.5)
CHLORIDE SERPL-SCNC: 111 MMOL/L — HIGH (ref 96–108)
CO2 SERPL-SCNC: 19 MMOL/L — LOW (ref 22–31)
CREAT SERPL-MCNC: 0.63 MG/DL — SIGNIFICANT CHANGE UP (ref 0.5–1.3)
GLUCOSE BLDC GLUCOMTR-MCNC: 109 MG/DL — HIGH (ref 70–99)
GLUCOSE BLDC GLUCOMTR-MCNC: 164 MG/DL — HIGH (ref 70–99)
GLUCOSE BLDC GLUCOMTR-MCNC: 165 MG/DL — HIGH (ref 70–99)
GLUCOSE BLDC GLUCOMTR-MCNC: 167 MG/DL — HIGH (ref 70–99)
GLUCOSE BLDC GLUCOMTR-MCNC: 201 MG/DL — HIGH (ref 70–99)
GLUCOSE SERPL-MCNC: 198 MG/DL — HIGH (ref 70–99)
HCT VFR BLD CALC: 24.1 % — LOW (ref 34.5–45)
HGB BLD-MCNC: 7.6 G/DL — LOW (ref 11.5–15.5)
MAGNESIUM SERPL-MCNC: 2 MG/DL — SIGNIFICANT CHANGE UP (ref 1.6–2.6)
MCHC RBC-ENTMCNC: 29.9 PG — SIGNIFICANT CHANGE UP (ref 27–34)
MCHC RBC-ENTMCNC: 31.5 GM/DL — LOW (ref 32–36)
MCV RBC AUTO: 94.9 FL — SIGNIFICANT CHANGE UP (ref 80–100)
METHOD TYPE: SIGNIFICANT CHANGE UP
NRBC # BLD: 0 /100 WBCS — SIGNIFICANT CHANGE UP (ref 0–0)
PHOSPHATE SERPL-MCNC: 1.7 MG/DL — LOW (ref 2.5–4.5)
PLATELET # BLD AUTO: 199 K/UL — SIGNIFICANT CHANGE UP (ref 150–400)
POTASSIUM SERPL-MCNC: 3.3 MMOL/L — LOW (ref 3.5–5.3)
POTASSIUM SERPL-SCNC: 3.3 MMOL/L — LOW (ref 3.5–5.3)
RBC # BLD: 2.54 M/UL — LOW (ref 3.8–5.2)
RBC # FLD: 15.4 % — HIGH (ref 10.3–14.5)
SODIUM SERPL-SCNC: 141 MMOL/L — SIGNIFICANT CHANGE UP (ref 135–145)
VANCOMYCIN FLD-MCNC: 16.2 UG/ML — SIGNIFICANT CHANGE UP
WBC # BLD: 7.33 K/UL — SIGNIFICANT CHANGE UP (ref 3.8–10.5)
WBC # FLD AUTO: 7.33 K/UL — SIGNIFICANT CHANGE UP (ref 3.8–10.5)

## 2019-05-02 PROCEDURE — 99233 SBSQ HOSP IP/OBS HIGH 50: CPT

## 2019-05-02 PROCEDURE — 71045 X-RAY EXAM CHEST 1 VIEW: CPT | Mod: 26

## 2019-05-02 PROCEDURE — 99291 CRITICAL CARE FIRST HOUR: CPT

## 2019-05-02 RX ORDER — POTASSIUM CHLORIDE 20 MEQ
20 PACKET (EA) ORAL ONCE
Qty: 0 | Refills: 0 | Status: COMPLETED | OUTPATIENT
Start: 2019-05-02 | End: 2019-05-02

## 2019-05-02 RX ORDER — POTASSIUM CHLORIDE 20 MEQ
40 PACKET (EA) ORAL ONCE
Qty: 0 | Refills: 0 | Status: COMPLETED | OUTPATIENT
Start: 2019-05-02 | End: 2019-05-02

## 2019-05-02 RX ORDER — PANTOPRAZOLE SODIUM 20 MG/1
40 TABLET, DELAYED RELEASE ORAL DAILY
Qty: 0 | Refills: 0 | Status: DISCONTINUED | OUTPATIENT
Start: 2019-05-02 | End: 2019-05-10

## 2019-05-02 RX ORDER — MEROPENEM 1 G/30ML
1000 INJECTION INTRAVENOUS EVERY 8 HOURS
Qty: 0 | Refills: 0 | Status: DISCONTINUED | OUTPATIENT
Start: 2019-05-02 | End: 2019-05-03

## 2019-05-02 RX ORDER — VANCOMYCIN HCL 1 G
750 VIAL (EA) INTRAVENOUS ONCE
Qty: 0 | Refills: 0 | Status: COMPLETED | OUTPATIENT
Start: 2019-05-02 | End: 2019-05-02

## 2019-05-02 RX ORDER — PANTOPRAZOLE SODIUM 20 MG/1
40 TABLET, DELAYED RELEASE ORAL
Qty: 0 | Refills: 0 | Status: DISCONTINUED | OUTPATIENT
Start: 2019-05-02 | End: 2019-05-02

## 2019-05-02 RX ORDER — POTASSIUM PHOSPHATE, MONOBASIC POTASSIUM PHOSPHATE, DIBASIC 236; 224 MG/ML; MG/ML
30 INJECTION, SOLUTION INTRAVENOUS ONCE
Qty: 0 | Refills: 0 | Status: COMPLETED | OUTPATIENT
Start: 2019-05-02 | End: 2019-05-02

## 2019-05-02 RX ORDER — ERTAPENEM SODIUM 1 G/1
1000 INJECTION, POWDER, LYOPHILIZED, FOR SOLUTION INTRAMUSCULAR; INTRAVENOUS EVERY 24 HOURS
Qty: 0 | Refills: 0 | Status: DISCONTINUED | OUTPATIENT
Start: 2019-05-02 | End: 2019-05-02

## 2019-05-02 RX ADMIN — Medication 250 MILLIGRAM(S): at 10:28

## 2019-05-02 RX ADMIN — MEROPENEM 100 MILLIGRAM(S): 1 INJECTION INTRAVENOUS at 05:53

## 2019-05-02 RX ADMIN — Medication 20 MILLIEQUIVALENT(S): at 10:28

## 2019-05-02 RX ADMIN — POTASSIUM PHOSPHATE, MONOBASIC POTASSIUM PHOSPHATE, DIBASIC 83.33 MILLIMOLE(S): 236; 224 INJECTION, SOLUTION INTRAVENOUS at 06:34

## 2019-05-02 RX ADMIN — PANTOPRAZOLE SODIUM 40 MILLIGRAM(S): 20 TABLET, DELAYED RELEASE ORAL at 14:25

## 2019-05-02 RX ADMIN — CHLORHEXIDINE GLUCONATE 1 APPLICATION(S): 213 SOLUTION TOPICAL at 12:09

## 2019-05-02 RX ADMIN — CHLORHEXIDINE GLUCONATE 15 MILLILITER(S): 213 SOLUTION TOPICAL at 18:17

## 2019-05-02 RX ADMIN — Medication 2: at 05:52

## 2019-05-02 RX ADMIN — CHLORHEXIDINE GLUCONATE 15 MILLILITER(S): 213 SOLUTION TOPICAL at 05:52

## 2019-05-02 RX ADMIN — PANTOPRAZOLE SODIUM 40 MILLIGRAM(S): 20 TABLET, DELAYED RELEASE ORAL at 05:53

## 2019-05-02 RX ADMIN — Medication 4: at 12:08

## 2019-05-02 RX ADMIN — Medication 1 APPLICATION(S): at 23:21

## 2019-05-02 RX ADMIN — Medication 1 APPLICATION(S): at 05:54

## 2019-05-02 RX ADMIN — ZINC OXIDE 1 APPLICATION(S): 200 OINTMENT TOPICAL at 05:54

## 2019-05-02 RX ADMIN — ZINC OXIDE 1 APPLICATION(S): 200 OINTMENT TOPICAL at 18:00

## 2019-05-02 RX ADMIN — Medication 2: at 23:28

## 2019-05-02 RX ADMIN — MEROPENEM 100 MILLIGRAM(S): 1 INJECTION INTRAVENOUS at 14:24

## 2019-05-02 RX ADMIN — ENOXAPARIN SODIUM 40 MILLIGRAM(S): 100 INJECTION SUBCUTANEOUS at 23:27

## 2019-05-02 RX ADMIN — ZINC OXIDE 1 APPLICATION(S): 200 OINTMENT TOPICAL at 22:30

## 2019-05-02 RX ADMIN — Medication 40 MILLIEQUIVALENT(S): at 05:55

## 2019-05-02 RX ADMIN — ZINC OXIDE 1 APPLICATION(S): 200 OINTMENT TOPICAL at 14:25

## 2019-05-02 RX ADMIN — MEROPENEM 100 MILLIGRAM(S): 1 INJECTION INTRAVENOUS at 22:30

## 2019-05-02 RX ADMIN — Medication 1 APPLICATION(S): at 14:25

## 2019-05-02 RX ADMIN — PROPOFOL 1.6 MICROGRAM(S)/KG/MIN: 10 INJECTION, EMULSION INTRAVENOUS at 03:13

## 2019-05-02 NOTE — PROGRESS NOTE ADULT - SUBJECTIVE AND OBJECTIVE BOX
INTERVAL HPI/OVERNIGHT EVENTS:    Patient was seen and examined at bedside.  Events noted.  Off pressors    ROS:    Unable to obtain       ANTIBIOTICS/RELEVANT:    MEDICATIONS  (STANDING):  chlorhexidine 0.12% Liquid 15 milliLiter(s) Oral Mucosa every 12 hours  chlorhexidine 2% Cloths 1 Application(s) Topical daily  dextrose 5%. 1000 milliLiter(s) (50 mL/Hr) IV Continuous <Continuous>  dextrose 50% Injectable 12.5 Gram(s) IV Push once  dextrose 50% Injectable 25 Gram(s) IV Push once  dextrose 50% Injectable 25 Gram(s) IV Push once  enoxaparin Injectable 40 milliGRAM(s) SubCutaneous every 24 hours  insulin lispro (HumaLOG) corrective regimen sliding scale   SubCutaneous every 6 hours  meropenem  IVPB 1000 milliGRAM(s) IV Intermittent every 8 hours  pantoprazole   Suspension 40 milliGRAM(s) Oral daily  petrolatum Ophthalmic Ointment 1 Application(s) Both EYES three times a day  zinc oxide 20% Ointment 1 Application(s) Topical three times a day    MEDICATIONS  (PRN):  dextrose 40% Gel 15 Gram(s) Oral once PRN Blood Glucose LESS THAN 70 milliGRAM(s)/deciliter  glucagon  Injectable 1 milliGRAM(s) IntraMuscular once PRN Glucose LESS THAN 70 milligrams/deciliter  morphine  - Injectable 2 milliGRAM(s) IV Push every 4 hours PRN Severe Pain (7 - 10)  ondansetron Injectable 8 milliGRAM(s) IV Push every 8 hours PRN Nausea and/or Vomiting        Vital Signs Last 24 Hrs  T(C): 37 (02 May 2019 11:35), Max: 37.7 (01 May 2019 17:52)  T(F): 98.6 (02 May 2019 11:35), Max: 99.8 (01 May 2019 17:52)  HR: 88 (02 May 2019 11:00) (66 - 88)  BP: 129/83 (02 May 2019 11:00) (65/47 - 129/83)  BP(mean): 111 (02 May 2019 11:00) (55 - 111)  RR: 30 (02 May 2019 11:00) (16 - 33)  SpO2: 100% (02 May 2019 11:00) (97% - 100%)    PHYSICAL EXAM:  Constitutional:  non-toxic, no distress  Eyes:THOMAS, EOMI  Ear/Nose/Throat:  ET tube in place 	  Neck:  supple  Respiratory: clear to auscultation   Cardiovascular: S1S2 RRR, no murmurs  Gastrointestinal:soft, (+) BS, no HSM, abdominal wound is clean   Extremities:no edema   Vascular: DP Pulse:	right normal; left normal      LABS:                        7.6    7.33  )-----------( 199      ( 02 May 2019 04:57 )             24.1     05-02    141  |  111<H>  |  25<H>  ----------------------------<  198<H>  3.3<L>   |  19<L>  |  0.63    Ca    8.7      02 May 2019 04:57  Phos  1.7     05-02  Mg     2.0     05-02    TPro  6.5  /  Alb  2.1<L>  /  TBili  0.2  /  DBili  x   /  AST  26  /  ALT  29  /  AlkPhos  117  05-01          MICROBIOLOGY:    Culture - Blood (05.01.19 @ 17:01)    Specimen Source: .Blood Blood    Culture Results:   No growth at 12 hours    Culture - Blood (04.30.19 @ 12:22)    -  Multidrug (KPC pos) resistant organism: Nondet    -  Escherichia coli: Detec    -  Ceftriaxone: R >32 Enterobacter, Citrobacter, and Serratia may develop resistance during prolonged therapy    -  Ciprofloxacin: R >32    -  Gentamicin: R >8    -  Meropenem: S 0.047    -  Piperacillin/Tazobactam: R <=8    -  Trimethoprim/Sulfamethoxazole: S <=0.5/9.5    -  Tobramycin: R >8    -  Ampicillin/Sulbactam: R >16/8    -  Amikacin: S <=8    -  Cefazolin: R >16    -  Ampicillin: R >16 These ampicillin results predict results for amoxicillin    Gram Stain:   Aerobic and Anaerobic Bottle: Gram Negative Rods  Result called to and read back by_ Ms. SHIRA Carlson RN([id number]) 04/30/2019  20:57:59    Specimen Source: .Blood Blood-Venous    Organism: Blood Culture PCR    Organism: Escherichia coli ESBL    Organism: Escherichia coli ESBL    Culture Results:   Growth in aerobic and anaerobic bottles: Escherichia coli  Susceptibility to follow.    Organism Identification: Escherichia coli ESBL  Escherichia coli ESBL  Blood Culture PCR    Method Type: PCR    Method Type: RAJIV    Method Type: ETEST        RADIOLOGY & ADDITIONAL STUDIES:

## 2019-05-02 NOTE — PROGRESS NOTE ADULT - ATTENDING COMMENTS
Patient and sister Esha counseled regarding recurrent complicated UTI and overall progress. I will discuss neurogenic bladder with Dr. Nix tomorrow for recommendations regarding possible alternatives to prolonged joseph catherization.

## 2019-05-02 NOTE — PROGRESS NOTE ADULT - ASSESSMENT
IMPRESSION:  E. Coli bacteremia, suspect  source    Recommend:  1.  Continue meropenem 1 gram IV q8hrs  2.  Follow up Etest for ertapenem  3.  Can stop vancomycin given no evidence of MRSA infection    ID teaching service will follow

## 2019-05-02 NOTE — PROGRESS NOTE ADULT - ASSESSMENT
58yo F HD4 with stage IV endometrial adenocarcinoma s/p staging surgery '18 and 1 round of chemotherapy 2/19 readmitted from [facility] with fever, previously admitted for management of symptomatic rectovaginal and enterovaginal fistulas. Presents on referral from [facility] with urosepsis.  Rapid response called due to worsening respiratory status and mental status, pt intubated and transferred to MICU.    1. Neuro: intubated/sedated.    CIDP s/p IVIG, next due on 5/9, also post polio contributing to weakness. Will need neuro to consult again (Dr. Sellers) regarding patient's strength. Per the MICU team who spoke with him, pt may need immunosuppression for this but cannot do that given her clinical picture  2. Pulm: CPAP  3. Cardio: HD stable  4. FEN/GI: NPO/dobhoff placed 5/1, tube feeds  5. : joseph in place. suspect urosepsis. consult urology. consider suprapubic catheter.  6. ID: meropenem, s/p vanc  7. Endocrine: FS, ISS, metformin for improved mortality when taking PO  8. VTE prophylaxis - SCDs, Lovenox 40 daily   9. GYN malignancy  -stopped anastrazole and initiated 3 weeks of megace 80mg BID followed by 3 weeks of tamoxifen. started megace and metformin 4/10. due to start tamoxifen today 5/1 but will delay until pt is taking po. will address this tomorrow.  10. Derm: monitor sacrum for s/s of pressure ulcer. encourage frequent changes in position and OOB during  the day  11. social work/dispo planning as patient will go back to [facility]. 60yo F HD4 with stage IV endometrial adenocarcinoma s/p staging surgery '18 and 1 round of chemotherapy 2/19 readmitted from [facility] with fever, previously admitted for management of symptomatic rectovaginal and enterovaginal fistulas. Presents on referral from [facility] with urosepsis.  Rapid response called due to worsening respiratory status and mental status, pt intubated and transferred to MICU.    1. Neuro:   CIDP s/p IVIG, next due on 5/9, also post polio contributing to weakness. Will need neuro to consult again (Dr. Sellers) regarding patient's strength. Per the MICU team who spoke with him, pt may need immunosuppression for this but cannot do that given her clinical picture  2. Pulm: 2L NC  3. Cardio: HD stable  4. FEN/GI: NPO/dobhoff placed 5/1, tube feeds  5. : joseph in place. suspect urosepsis. consult urology. consider suprapubic catheter.  6. ID: meropenem, s/p vanc  7. Endocrine: FS, ISS, metformin for improved mortality when taking PO  8. VTE prophylaxis - SCDs, Lovenox 40 daily   9. GYN malignancy  -stopped anastrazole and initiated 3 weeks of megace 80mg BID followed by 3 weeks of tamoxifen. started megace and metformin 4/10. due to start tamoxifen today 5/1 but will delay until pt is taking po. will address this tomorrow.  10. Derm: monitor sacrum for s/s of pressure ulcer. encourage frequent changes in position and OOB during  the day  11. social work/dispo planning as patient will go back to [facility].

## 2019-05-02 NOTE — PROGRESS NOTE ADULT - ASSESSMENT
Assessment and Plan: 59 F w/ Stage IV Endometrial Carcinoma s/p staging surgery 7/2018 and 1 cycle of chemo in 2/2019, DM, low anterior resection mobilization of splenic flexure, end colostomy w/ rectovaginal fistula w/ numerous admissions for urinary tract infection and bacteremia w/ acute hypoxic respiratory failure and AMS in the setting of urosepsis.    Neurology:  #Metabolic encephalopathy  Pt w/ acute change in mental status likely in the setting of sepsis and acidosis. Now improving, responding appropriately w nodding and shaking of head.  - wean sedation and extubate as tolerated.    Pulmonary:  #Acute hypoxic respiratory failure  Pt w/ changes in mentation in the setting of sepsis. Now intubated  - C/w VC/AC   - Will monitor and adjust vent as needed  - Daily sedation holiday and CPAP trial - failed CPAP on 5/1 am as became tachypneic and tachycardic, however possibly component of ET tube being too low.  - Treat underlying sepsis as below    Cardiology:  #Septic shock 2/2 urosepsis  Resolved   Initially requiring levophed, now weaned off on 5/1 am  - S/p initiation of abx; vancomycin, meropenem and one dose of amikacin  - Lactate not elevated  - S/p 2L fluid resuscitation (>30cc/kg); reperfusion exam normal  - blood and urine cultures from 4/30 growing Ecoli (not MDR e coli) - awaiting sensitivities  - F/u sputum culture  - will send surveillance blood cx 5/1  - will give abx through chemoport    #volume status  - high output state 2/2 to sepsis. warm, well perfused, mentating appropriately w good urine output  - currently euvolemic  - d/c IV NS    GI:  #Ostomy  Pt w/ hx of enterovaginal fistula w/ ostomy bag in place  - Continue to monitor ostomy output  - continue bowel regimen      :  #sepsis 2/2 to UTI  Pt w/ hx of indwelling joseph w/ recurrent UTI on ertapenem as outpatient for ESBL and proteus mirabilis in urine and blood  - Will c/w vanc/meropenem; s/p one dose of amikacin   - F/u ID recommendations  - Continue management of sepsis as above  - urine growing e coli - pending sensitivities    #Indwelling joseph  Pt w/ hx of indwelling joseph catheter. Was recently replaced within the past week   - C/w joseph    #Endometrial CA  Pt follows w/ GYN. F/u GYN recs  - no planned chemo or intervention at this time    Renal:  #non anion gap metabolic acidosis  - likely 2/2 to normal saline administration  - d/c NS as patient receiving enough fluids through abx and other gtts    MSK:  No active issues    Endocrine:  #DM  - 4.8 A1c  - FS/MISS     ID:  #Septic shock 2/2 UTI  - C/w meropenem and vancomycin for now  - S/p 1 dose of amikacin  - F/u ID recs  - Rest of plan as above    FEN: NPO w tube feeds, Replete lytes PRN K>4, Mg>2    PPx: Lovenox, SCDs    Code: FULL CODE    Dispo: Patient requires continued monitoring in MICU  Critical care time rendered 50 minutes Assessment and Plan: 59 F w/ Stage IV Endometrial Carcinoma s/p staging surgery 7/2018 and 1 cycle of chemo in 2/2019, DM, low anterior resection mobilization of splenic flexure, end colostomy w/ rectovaginal fistula w/ numerous admissions for urinary tract infection and bacteremia w/ acute hypoxic respiratory failure and AMS in the setting of urosepsis; now s/p extubation on 5/2.    Neurology:  #Metabolic encephalopathy  RESOLVED  Pt w/ acute change in mental status likely in the setting of sepsis and acidosis.     Pulmonary:  #Acute hypoxic respiratory failure  Resolved, extubated on 5/2  - Treat underlying sepsis as below    Cardiology:  #Septic shock 2/2 urosepsis  Resolved   Initially requiring levophed, weaned off on 5/1 am  - blood and urine cultures from 4/30 growing ESBL Ecoli  sensitive to meropenem  - ID following  - continue meropenem 1gm q8hrs  - f/u surveillance blood cx 5/1  - will give abx through chemoport    #volume status  - high output state 2/2 to sepsis. warm, well perfused, mentating appropriately w good urine output  - currently euvolemic    GI:  #Ostomy  Pt w/ hx of enterovaginal fistula w/ ostomy bag in place  - Continue to monitor ostomy output  - continue bowel regimen      :  #sepsis 2/2 to UTI  - Continue management of sepsis as above    #Indwelling joseph  Pt w/ hx of indwelling joseph catheter. Was recently replaced within the past week   - C/w joseph    #Endometrial CA  Pt follows w/ GYN. F/u GYN recs  - no planned chemo or intervention at this time    Renal:  #non anion gap metabolic acidosis  - improved  - likely 2/2 to normal saline administration    MSK:  No active issues    Endocrine:  #DM  - 4.8 A1c  - FS/MISS     ID:  #Septic shock 2/2 UTI  plan as above for sepsis - continue meropenem for ESBL e coli bactermia    FEN: NPO w tube feeds - dysphagia screen now that extubated  Replete lytes PRN K>4, Mg>2    PPx: Lovenox, SCDs    Code: FULL CODE    Dispo: Patient requires continued monitoring in MICU Assessment and Plan: 59 F w/ Stage IV Endometrial Carcinoma s/p staging surgery 7/2018 and 1 cycle of chemo in 2/2019, DM, low anterior resection mobilization of splenic flexure, end colostomy w/ rectovaginal fistula w/ numerous admissions for urinary tract infection and bacteremia w/ acute hypoxic respiratory failure and AMS in the setting of urosepsis; now s/p extubation on 5/2.    Neurology:  #Metabolic encephalopathy  RESOLVED  Pt w/ acute change in mental status likely in the setting of sepsis and acidosis.     Pulmonary:  #Acute hypoxic respiratory failure  Resolved, extubated on 5/2  - Treat underlying sepsis as below    Cardiology:  #Septic shock 2/2 urosepsis  Resolved   Initially requiring levophed, weaned off on 5/1 am  - blood and urine cultures from 4/30 growing ESBL Ecoli  sensitive to meropenem  - ID following  - continue meropenem 1gm q8hrs  - f/u surveillance blood cx 5/1  - will give abx through chemoport    #volume status  - high output state 2/2 to sepsis. warm, well perfused, mentating appropriately w good urine output  - currently euvolemic    GI:  #Ostomy  Pt w/ hx of enterovaginal fistula w/ ostomy bag in place  - Continue to monitor ostomy output  - continue bowel regimen      :  #sepsis 2/2 to UTI  - Continue management of sepsis as above    #Indwelling joseph  Pt w/ hx of indwelling joseph catheter. Was recently replaced within the past week   - C/w joseph    #Endometrial CA  Pt follows w/ GYN. F/u GYN recs  - no planned chemo or intervention at this time    Renal:  #non anion gap metabolic acidosis  - improved  - likely 2/2 to normal saline administration    MSK:  No active issues    Endocrine:  #DM  - 4.8 A1c  - FS/MISS     ID:  #Septic shock 2/2 UTI  plan as above for sepsis - continue meropenem for ESBL e coli bactermia    FEN: NPO w tube feeds - dysphagia screen now that extubated  Replete lytes PRN K>4, Mg>2    PPx: Lovenox, SCDs    Code: FULL CODE    Dispo: Patient requires continued monitoring in MICU  Critical care time rendered 50 minutes

## 2019-05-02 NOTE — PROGRESS NOTE ADULT - SUBJECTIVE AND OBJECTIVE BOX
GYN Progress Note    Patient seen at bedside with gyn onc attending and palliative care team member.  she is extubated. stable on 2L o2. sitting in chair.  on soft diet.  joseph in place.  afebrile on meropenem.  comfortable today, no complaints.     Denies CP, palpitations, SOB, fever, chills, nausea, vomiting.    Vital Signs Last 24 Hrs  T(C): 36.9 (02 May 2019 19:00), Max: 37.5 (02 May 2019 07:00)  T(F): 98.5 (02 May 2019 19:00), Max: 99.5 (02 May 2019 07:00)  HR: 86 (02 May 2019 20:00) (66 - 94)  BP: 110/71 (02 May 2019 20:00) (84/53 - 129/83)  BP(mean): 86 (02 May 2019 20:00) (68 - 111)  RR: 25 (02 May 2019 20:00) (16 - 33)  SpO2: 100% (02 May 2019 20:00) (97% - 100%)    Physical Exam:  Gen: No Acute Distress  Pulm: no respiratory distress  Ext: SCDs in place, wnl    I&O's Summary    01 May 2019 07:01  -  02 May 2019 07:00  --------------------------------------------------------  IN: 1728 mL / OUT: 965 mL / NET: 763 mL    02 May 2019 07:01  -  02 May 2019 21:07  --------------------------------------------------------  IN: 762 mL / OUT: 650 mL / NET: 112 mL      MEDICATIONS  (STANDING):  chlorhexidine 0.12% Liquid 15 milliLiter(s) Oral Mucosa every 12 hours  chlorhexidine 2% Cloths 1 Application(s) Topical daily  dextrose 5%. 1000 milliLiter(s) (50 mL/Hr) IV Continuous <Continuous>  dextrose 50% Injectable 12.5 Gram(s) IV Push once  dextrose 50% Injectable 25 Gram(s) IV Push once  dextrose 50% Injectable 25 Gram(s) IV Push once  enoxaparin Injectable 40 milliGRAM(s) SubCutaneous every 24 hours  insulin lispro (HumaLOG) corrective regimen sliding scale   SubCutaneous every 6 hours  meropenem  IVPB 1000 milliGRAM(s) IV Intermittent every 8 hours  pantoprazole   Suspension 40 milliGRAM(s) Oral daily  petrolatum Ophthalmic Ointment 1 Application(s) Both EYES three times a day  zinc oxide 20% Ointment 1 Application(s) Topical three times a day    MEDICATIONS  (PRN):  dextrose 40% Gel 15 Gram(s) Oral once PRN Blood Glucose LESS THAN 70 milliGRAM(s)/deciliter  glucagon  Injectable 1 milliGRAM(s) IntraMuscular once PRN Glucose LESS THAN 70 milligrams/deciliter  morphine  - Injectable 2 milliGRAM(s) IV Push every 4 hours PRN Severe Pain (7 - 10)  ondansetron Injectable 8 milliGRAM(s) IV Push every 8 hours PRN Nausea and/or Vomiting      LABS:                        7.6    7.33  )-----------( 199      ( 02 May 2019 04:57 )             24.1     05-02    141  |  111<H>  |  25<H>  ----------------------------<  198<H>  3.3<L>   |  19<L>  |  0.63    Ca    8.7      02 May 2019 04:57  Phos  1.7     05-02  Mg     2.0     05-02    TPro  6.5  /  Alb  2.1<L>  /  TBili  0.2  /  DBili  x   /  AST  26  /  ALT  29  /  AlkPhos  117  05-01

## 2019-05-02 NOTE — PROGRESS NOTE ADULT - SUBJECTIVE AND OBJECTIVE BOX
INCOMPLETE NOTE    INTERVAL HPI/OVERNIGHT EVENTS:    OVERNIGHT: No overnight events.  SUBJECTIVE: Patient seen and examined at bedside.     ROS:  CV: Denies chest pain  Resp: Denies SOB  GI: Denies abdominal pain, constipation, diarrhea, nausea, vomiting  : Denies dysuria  ID: Denies fevers, chills  MSK: Denies joint pain     OBJECTIVE:    VITAL SIGNS:  ICU Vital Signs Last 24 Hrs  T(C): 36.9 (02 May 2019 01:03), Max: 37.7 (01 May 2019 17:52)  T(F): 98.5 (02 May 2019 01:03), Max: 99.8 (01 May 2019 17:52)  HR: 70 (02 May 2019 05:00) (68 - 106)  BP: 101/64 (02 May 2019 05:00) (65/47 - 143/89)  BP(mean): 83 (02 May 2019 05:00) (55 - 119)  ABP: --  ABP(mean): --  RR: 16 (02 May 2019 05:00) (16 - 22)  SpO2: 99% (02 May 2019 05:00) (96% - 100%)    Mode: AC/ CMV (Assist Control/ Continuous Mandatory Ventilation), RR (machine): 17, TV (machine): 400, FiO2: 40, PEEP: 5, ITime: 1, MAP: 9, PIP: 22    04-30 @ 07:01 - 05-01 @ 07:00  --------------------------------------------------------  IN: 1399.8 mL / OUT: 1390 mL / NET: 9.8 mL    05-01 @ 07:01  -  05-02 @ 05:50  --------------------------------------------------------  IN: 1425 mL / OUT: 815 mL / NET: 610 mL      CAPILLARY BLOOD GLUCOSE      POCT Blood Glucose.: 165 mg/dL (02 May 2019 05:45)      PHYSICAL EXAM:  General: NAD, comfortable  HEENT: NCAT, PERRL, clear conjunctiva, no scleral icterus  Neck: supple, no JVD  Respiratory: CTA b/l, no wheezing, rhonchi, rales  Cardiovascular: RRR, normal S1S2, no M/R/G  Vascular: 2+ radial and DP pulses  Abdomen: soft, NT/ND, bowel sounds in all four quadrants, no palpable masses  Extremities: WWP, no clubbing, cyanosis, or edema  Skin: No rashes present  Neuro:     MEDICATIONS:  MEDICATIONS  (STANDING):  chlorhexidine 0.12% Liquid 15 milliLiter(s) Oral Mucosa every 12 hours  chlorhexidine 2% Cloths 1 Application(s) Topical daily  dextrose 5%. 1000 milliLiter(s) (50 mL/Hr) IV Continuous <Continuous>  dextrose 50% Injectable 12.5 Gram(s) IV Push once  dextrose 50% Injectable 25 Gram(s) IV Push once  dextrose 50% Injectable 25 Gram(s) IV Push once  enoxaparin Injectable 40 milliGRAM(s) SubCutaneous every 24 hours  insulin lispro (HumaLOG) corrective regimen sliding scale   SubCutaneous every 6 hours  meropenem  IVPB 1000 milliGRAM(s) IV Intermittent every 8 hours  meropenem  IVPB      norepinephrine Infusion 0.05 MICROgram(s)/kG/Min (5.016 mL/Hr) IV Continuous <Continuous>  pantoprazole  Injectable 40 milliGRAM(s) IV Push every 24 hours  petrolatum Ophthalmic Ointment 1 Application(s) Both EYES three times a day  potassium chloride   Powder 40 milliEquivalent(s) Oral once  potassium chloride   Powder 20 milliEquivalent(s) Oral once  potassium phosphate IVPB 30 milliMole(s) IV Intermittent once  propofol Infusion 5 MICROgram(s)/kG/Min (1.605 mL/Hr) IV Continuous <Continuous>  zinc oxide 20% Ointment 1 Application(s) Topical three times a day    MEDICATIONS  (PRN):  dextrose 40% Gel 15 Gram(s) Oral once PRN Blood Glucose LESS THAN 70 milliGRAM(s)/deciliter  glucagon  Injectable 1 milliGRAM(s) IntraMuscular once PRN Glucose LESS THAN 70 milligrams/deciliter  morphine  - Injectable 2 milliGRAM(s) IV Push every 4 hours PRN Severe Pain (7 - 10)  ondansetron Injectable 8 milliGRAM(s) IV Push every 8 hours PRN Nausea and/or Vomiting      ALLERGIES:  Allergies    No Known Allergies    Intolerances        LABS:                        7.6    7.33  )-----------( 199      ( 02 May 2019 04:57 )             24.1     05-02    141  |  111<H>  |  25<H>  ----------------------------<  198<H>  3.3<L>   |  19<L>  |  0.63    Ca    8.7      02 May 2019 04:57  Phos  1.7     05-02  Mg     2.0     05-02    TPro  6.5  /  Alb  2.1<L>  /  TBili  0.2  /  DBili  x   /  AST  26  /  ALT  29  /  AlkPhos  117  05-01          RADIOLOGY & ADDITIONAL TESTS: Reviewed. INTERVAL HPI/OVERNIGHT EVENTS: KESHAWN    SUBJECTIVE: Patient seen and examined at bedside. Patient still intubated, but awake and alert. Placed on cpap this morning and stated she was breathing without issue.    ROS:  CV: Denies chest pain  Resp: Denies SOB  GI: Denies abdominal pain, constipation, diarrhea, nausea, vomiting  : Denies dysuria  ID: Denies fevers, chills  MSK: Denies joint pain     OBJECTIVE:    VITAL SIGNS:  ICU Vital Signs Last 24 Hrs  T(C): 36.9 (02 May 2019 01:03), Max: 37.7 (01 May 2019 17:52)  T(F): 98.5 (02 May 2019 01:03), Max: 99.8 (01 May 2019 17:52)  HR: 70 (02 May 2019 05:00) (68 - 106)  BP: 101/64 (02 May 2019 05:00) (65/47 - 143/89)  BP(mean): 83 (02 May 2019 05:00) (55 - 119)  ABP: --  ABP(mean): --  RR: 16 (02 May 2019 05:00) (16 - 22)  SpO2: 99% (02 May 2019 05:00) (96% - 100%)    Mode: AC/ CMV (Assist Control/ Continuous Mandatory Ventilation), RR (machine): 17, TV (machine): 400, FiO2: 40, PEEP: 5, ITime: 1, MAP: 9, PIP: 22    04-30 @ 07:01 - 05-01 @ 07:00  --------------------------------------------------------  IN: 1399.8 mL / OUT: 1390 mL / NET: 9.8 mL    05-01 @ 07:01  -  05-02 @ 05:50  --------------------------------------------------------  IN: 1425 mL / OUT: 815 mL / NET: 610 mL      CAPILLARY BLOOD GLUCOSE      POCT Blood Glucose.: 165 mg/dL (02 May 2019 05:45)      PHYSICAL EXAM:  General: elderly female in NAD, comfortable intubated on cpap  HEENT: NCAT, PERRL, clear conjunctiva, no scleral icterus, intubated  Neck: supple, no JVD  Respiratory: CTA b/l, no wheezing, rhonchi, rales, appreciated sounds from ventilator throughout  Cardiovascular: RRR, normal S1S2, no M/R/G  Vascular: 2+ radial and DP pulses  Abdomen: soft, NT/ND, bowel sounds present, midline scar well healed, Colostomy bag on L side w hard brown stool.  Extremities: WWP, no clubbing, cyanosis, or edema  Skin: No rashes present    MEDICATIONS:  MEDICATIONS  (STANDING):  chlorhexidine 0.12% Liquid 15 milliLiter(s) Oral Mucosa every 12 hours  chlorhexidine 2% Cloths 1 Application(s) Topical daily  dextrose 5%. 1000 milliLiter(s) (50 mL/Hr) IV Continuous <Continuous>  dextrose 50% Injectable 12.5 Gram(s) IV Push once  dextrose 50% Injectable 25 Gram(s) IV Push once  dextrose 50% Injectable 25 Gram(s) IV Push once  enoxaparin Injectable 40 milliGRAM(s) SubCutaneous every 24 hours  insulin lispro (HumaLOG) corrective regimen sliding scale   SubCutaneous every 6 hours  meropenem  IVPB 1000 milliGRAM(s) IV Intermittent every 8 hours  meropenem  IVPB      norepinephrine Infusion 0.05 MICROgram(s)/kG/Min (5.016 mL/Hr) IV Continuous <Continuous>  pantoprazole  Injectable 40 milliGRAM(s) IV Push every 24 hours  petrolatum Ophthalmic Ointment 1 Application(s) Both EYES three times a day  potassium chloride   Powder 40 milliEquivalent(s) Oral once  potassium chloride   Powder 20 milliEquivalent(s) Oral once  potassium phosphate IVPB 30 milliMole(s) IV Intermittent once  propofol Infusion 5 MICROgram(s)/kG/Min (1.605 mL/Hr) IV Continuous <Continuous>  zinc oxide 20% Ointment 1 Application(s) Topical three times a day    MEDICATIONS  (PRN):  dextrose 40% Gel 15 Gram(s) Oral once PRN Blood Glucose LESS THAN 70 milliGRAM(s)/deciliter  glucagon  Injectable 1 milliGRAM(s) IntraMuscular once PRN Glucose LESS THAN 70 milligrams/deciliter  morphine  - Injectable 2 milliGRAM(s) IV Push every 4 hours PRN Severe Pain (7 - 10)  ondansetron Injectable 8 milliGRAM(s) IV Push every 8 hours PRN Nausea and/or Vomiting      ALLERGIES:  Allergies    No Known Allergies    Intolerances        LABS:                        7.6    7.33  )-----------( 199      ( 02 May 2019 04:57 )             24.1     05-02    141  |  111<H>  |  25<H>  ----------------------------<  198<H>  3.3<L>   |  19<L>  |  0.63    Ca    8.7      02 May 2019 04:57  Phos  1.7     05-02  Mg     2.0     05-02    TPro  6.5  /  Alb  2.1<L>  /  TBili  0.2  /  DBili  x   /  AST  26  /  ALT  29  /  AlkPhos  117  05-01          RADIOLOGY & ADDITIONAL TESTS: Reviewed. INTERVAL HPI/OVERNIGHT EVENTS: KESHAWN    SUBJECTIVE: Patient seen and examined at bedside. Patient still intubated, but awake and alert. Placed on cpap this morning and stated she was breathing without issue.    ROS:  CV: Denies chest pain  Resp: Denies SOB  GI: Denies abdominal pain, constipation, diarrhea, nausea, vomiting  : Denies dysuria  ID: Denies fevers, chills  MSK: Denies joint pain     OBJECTIVE:    VITAL SIGNS:  ICU Vital Signs Last 24 Hrs  T(C): 36.9 (02 May 2019 01:03), Max: 37.7 (01 May 2019 17:52)  T(F): 98.5 (02 May 2019 01:03), Max: 99.8 (01 May 2019 17:52)  HR: 70 (02 May 2019 05:00) (68 - 106)  BP: 101/64 (02 May 2019 05:00) (65/47 - 143/89)  BP(mean): 83 (02 May 2019 05:00) (55 - 119)  ABP: --  ABP(mean): --  RR: 16 (02 May 2019 05:00) (16 - 22)  SpO2: 99% (02 May 2019 05:00) (96% - 100%)    Mode: AC/ CMV (Assist Control/ Continuous Mandatory Ventilation), RR (machine): 17, TV (machine): 400, FiO2: 40, PEEP: 5, ITime: 1, MAP: 9, PIP: 22    04-30 @ 07:01 - 05-01 @ 07:00  --------------------------------------------------------  IN: 1399.8 mL / OUT: 1390 mL / NET: 9.8 mL    05-01 @ 07:01  -  05-02 @ 05:50  --------------------------------------------------------  IN: 1425 mL / OUT: 815 mL / NET: 610 mL      CAPILLARY BLOOD GLUCOSE      POCT Blood Glucose.: 165 mg/dL (02 May 2019 05:45)      PHYSICAL EXAM:  General: elderly female in NAD, comfortable intubated on cpap  HEENT: NCAT, PERRL, clear conjunctiva, no scleral icterus, intubated  Neck: supple, no JVD  Respiratory: CTA b/l, no wheezing, rhonchi, rales, appreciated sounds from ventilator throughout  Cardiovascular: RRR, normal S1S2, no M/R/G  : joseph in place  Vascular: 2+ radial and DP pulses  Abdomen: soft, NT/ND, bowel sounds present, midline scar well healed, Colostomy bag on L side w hard brown stool.  Extremities: WWP, no clubbing, cyanosis, or edema  Skin: No rashes present    MEDICATIONS:  MEDICATIONS  (STANDING):  chlorhexidine 0.12% Liquid 15 milliLiter(s) Oral Mucosa every 12 hours  chlorhexidine 2% Cloths 1 Application(s) Topical daily  dextrose 5%. 1000 milliLiter(s) (50 mL/Hr) IV Continuous <Continuous>  dextrose 50% Injectable 12.5 Gram(s) IV Push once  dextrose 50% Injectable 25 Gram(s) IV Push once  dextrose 50% Injectable 25 Gram(s) IV Push once  enoxaparin Injectable 40 milliGRAM(s) SubCutaneous every 24 hours  insulin lispro (HumaLOG) corrective regimen sliding scale   SubCutaneous every 6 hours  meropenem  IVPB 1000 milliGRAM(s) IV Intermittent every 8 hours  meropenem  IVPB      norepinephrine Infusion 0.05 MICROgram(s)/kG/Min (5.016 mL/Hr) IV Continuous <Continuous>  pantoprazole  Injectable 40 milliGRAM(s) IV Push every 24 hours  petrolatum Ophthalmic Ointment 1 Application(s) Both EYES three times a day  potassium chloride   Powder 40 milliEquivalent(s) Oral once  potassium chloride   Powder 20 milliEquivalent(s) Oral once  potassium phosphate IVPB 30 milliMole(s) IV Intermittent once  propofol Infusion 5 MICROgram(s)/kG/Min (1.605 mL/Hr) IV Continuous <Continuous>  zinc oxide 20% Ointment 1 Application(s) Topical three times a day    MEDICATIONS  (PRN):  dextrose 40% Gel 15 Gram(s) Oral once PRN Blood Glucose LESS THAN 70 milliGRAM(s)/deciliter  glucagon  Injectable 1 milliGRAM(s) IntraMuscular once PRN Glucose LESS THAN 70 milligrams/deciliter  morphine  - Injectable 2 milliGRAM(s) IV Push every 4 hours PRN Severe Pain (7 - 10)  ondansetron Injectable 8 milliGRAM(s) IV Push every 8 hours PRN Nausea and/or Vomiting      ALLERGIES:  Allergies    No Known Allergies    Intolerances        LABS:                        7.6    7.33  )-----------( 199      ( 02 May 2019 04:57 )             24.1     05-02    141  |  111<H>  |  25<H>  ----------------------------<  198<H>  3.3<L>   |  19<L>  |  0.63    Ca    8.7      02 May 2019 04:57  Phos  1.7     05-02  Mg     2.0     05-02    TPro  6.5  /  Alb  2.1<L>  /  TBili  0.2  /  DBili  x   /  AST  26  /  ALT  29  /  AlkPhos  117  05-01          RADIOLOGY & ADDITIONAL TESTS: Reviewed.

## 2019-05-02 NOTE — PROGRESS NOTE ADULT - ASSESSMENT
60yo F HD4 with stage IV endometrial adenocarcinoma s/p staging surgery '18 and 1 round of chemotherapy 2/19 readmitted from [facility] with fever, previously admitted for management of symptomatic rectovaginal and enterovaginal fistulas. Presents on referral from [facility] with urosepsis.  Rapid response called due to worsening respiratory status and mental status, pt intubated and transferred to MICU.    1. Neuro: intubated/sedated.    CIDP s/p IVIG, next due on 5/9, also post polio contributing to weakness. Will need neuro to consult again (Dr. Sellers) regarding patient's strength.  2. Pulm: CPAP  3. Cardio: HD stable  4. FEN/GI: NPO/dobhoff placed 5/1, tube feeds  5. : joseph in place. suspect urosepsis.   - CT abdomen/pelvis w/ IV and oral contrast performed 3/15 showed improved enterovaginal fistula, no contrast in vagina, repeat CT on 4/5 showed no contrast in vagina again   6. ID: meropenem, vanc  7. Endocrine: FS, ISS, metformin for improved mortality when taking PO  8. VTE prophylaxis - SCDs, Lovenox 40 daily   9. GYN malignancy  -stopped anastrazole and initiated 3 weeks of megace 80mg BID followed by 3 weeks of tamoxifen. started megace and metformin 4/10. due to start tamoxifen today 5/1 but will delay until pt is taking po.  10. Derm: monitor sacrum for s/s of pressure ulcer. encourage frequent changes in position and OOB during  the day  11. social work/dispo planning as patient will go back to [facility].

## 2019-05-02 NOTE — PROGRESS NOTE ADULT - SUBJECTIVE AND OBJECTIVE BOX
GYN Progress Note    Patient seen at bedside.  respiratory at bedside assessing if pt can be extubated  pt currently comfortable with no respiratory distress  afebrile overnight  getting tube feeds  no complaints at this time    Denies CP, palpitations, SOB, fever, chills, nausea, vomiting.    Vital Signs Last 24 Hrs  T(C): 37.5 (02 May 2019 07:00), Max: 37.7 (01 May 2019 17:52)  T(F): 99.5 (02 May 2019 07:00), Max: 99.8 (01 May 2019 17:52)  HR: 74 (02 May 2019 08:00) (66 - 106)  BP: 90/62 (02 May 2019 08:00) (65/47 - 143/89)  BP(mean): 74 (02 May 2019 08:00) (55 - 119)  RR: 17 (02 May 2019 08:00) (16 - 33)  SpO2: 100% (02 May 2019 08:00) (96% - 100%)    Physical Exam:  Gen: No Acute Distress  Pulm: no respiratory distress  GI: soft, appropriately nontender, nondistended, stool on ostomy  Ext: SCDs in place, wnl, still has weakness    I&O's Summary    01 May 2019 07:01  -  02 May 2019 07:00  --------------------------------------------------------  IN: 1728 mL / OUT: 965 mL / NET: 763 mL    02 May 2019 07:01  -  02 May 2019 08:03  --------------------------------------------------------  IN: 113 mL / OUT: 40 mL / NET: 73 mL      MEDICATIONS  (STANDING):  chlorhexidine 0.12% Liquid 15 milliLiter(s) Oral Mucosa every 12 hours  chlorhexidine 2% Cloths 1 Application(s) Topical daily  dextrose 5%. 1000 milliLiter(s) (50 mL/Hr) IV Continuous <Continuous>  dextrose 50% Injectable 12.5 Gram(s) IV Push once  dextrose 50% Injectable 25 Gram(s) IV Push once  dextrose 50% Injectable 25 Gram(s) IV Push once  enoxaparin Injectable 40 milliGRAM(s) SubCutaneous every 24 hours  insulin lispro (HumaLOG) corrective regimen sliding scale   SubCutaneous every 6 hours  meropenem  IVPB 1000 milliGRAM(s) IV Intermittent every 8 hours  meropenem  IVPB      norepinephrine Infusion 0.05 MICROgram(s)/kG/Min (5.016 mL/Hr) IV Continuous <Continuous>  pantoprazole  Injectable 40 milliGRAM(s) IV Push every 24 hours  petrolatum Ophthalmic Ointment 1 Application(s) Both EYES three times a day  potassium chloride   Powder 20 milliEquivalent(s) Oral once  propofol Infusion 5 MICROgram(s)/kG/Min (1.605 mL/Hr) IV Continuous <Continuous>  vancomycin  IVPB 750 milliGRAM(s) IV Intermittent once  zinc oxide 20% Ointment 1 Application(s) Topical three times a day    MEDICATIONS  (PRN):  dextrose 40% Gel 15 Gram(s) Oral once PRN Blood Glucose LESS THAN 70 milliGRAM(s)/deciliter  glucagon  Injectable 1 milliGRAM(s) IntraMuscular once PRN Glucose LESS THAN 70 milligrams/deciliter  morphine  - Injectable 2 milliGRAM(s) IV Push every 4 hours PRN Severe Pain (7 - 10)  ondansetron Injectable 8 milliGRAM(s) IV Push every 8 hours PRN Nausea and/or Vomiting      LABS:                        7.6    7.33  )-----------( 199      ( 02 May 2019 04:57 )             24.1     05-02    141  |  111<H>  |  25<H>  ----------------------------<  198<H>  3.3<L>   |  19<L>  |  0.63    Ca    8.7      02 May 2019 04:57  Phos  1.7     05-02  Mg     2.0     05-02    TPro  6.5  /  Alb  2.1<L>  /  TBili  0.2  /  DBili  x   /  AST  26  /  ALT  29  /  AlkPhos  117  05-01

## 2019-05-03 DIAGNOSIS — C80.1 MALIGNANT (PRIMARY) NEOPLASM, UNSPECIFIED: ICD-10-CM

## 2019-05-03 DIAGNOSIS — R53.81 OTHER MALAISE: ICD-10-CM

## 2019-05-03 DIAGNOSIS — A41.9 SEPSIS, UNSPECIFIED ORGANISM: ICD-10-CM

## 2019-05-03 DIAGNOSIS — J96.01 ACUTE RESPIRATORY FAILURE WITH HYPOXIA: ICD-10-CM

## 2019-05-03 DIAGNOSIS — N31.9 NEUROMUSCULAR DYSFUNCTION OF BLADDER, UNSPECIFIED: ICD-10-CM

## 2019-05-03 DIAGNOSIS — Z71.89 OTHER SPECIFIED COUNSELING: ICD-10-CM

## 2019-05-03 DIAGNOSIS — Z51.5 ENCOUNTER FOR PALLIATIVE CARE: ICD-10-CM

## 2019-05-03 DIAGNOSIS — L98.8 OTHER SPECIFIED DISORDERS OF THE SKIN AND SUBCUTANEOUS TISSUE: ICD-10-CM

## 2019-05-03 PROBLEM — Z78.9 NO HISTORY OF ALCOHOL USE: Status: ACTIVE | Noted: 2019-05-03

## 2019-05-03 LAB
-  ERTAPENEM: SIGNIFICANT CHANGE UP
-  MEROPENEM: SIGNIFICANT CHANGE UP
ANION GAP SERPL CALC-SCNC: 8 MMOL/L — SIGNIFICANT CHANGE UP (ref 5–17)
BUN SERPL-MCNC: 17 MG/DL — SIGNIFICANT CHANGE UP (ref 7–23)
CALCIUM SERPL-MCNC: 9.1 MG/DL — SIGNIFICANT CHANGE UP (ref 8.4–10.5)
CHLORIDE SERPL-SCNC: 111 MMOL/L — HIGH (ref 96–108)
CO2 SERPL-SCNC: 24 MMOL/L — SIGNIFICANT CHANGE UP (ref 22–31)
CREAT SERPL-MCNC: 0.48 MG/DL — LOW (ref 0.5–1.3)
CULTURE RESULTS: SIGNIFICANT CHANGE UP
GD1A GANGL IGG+IGM SER IA-ACNC: SIGNIFICANT CHANGE UP
GD1B GANGL IGG+IGM SER IA-ACNC: SIGNIFICANT CHANGE UP
GLUCOSE BLDC GLUCOMTR-MCNC: 112 MG/DL — HIGH (ref 70–99)
GLUCOSE BLDC GLUCOMTR-MCNC: 141 MG/DL — HIGH (ref 70–99)
GLUCOSE BLDC GLUCOMTR-MCNC: 144 MG/DL — HIGH (ref 70–99)
GLUCOSE BLDC GLUCOMTR-MCNC: 182 MG/DL — HIGH (ref 70–99)
GLUCOSE SERPL-MCNC: 123 MG/DL — HIGH (ref 70–99)
GM1 ASIALO IGG+IGM SER IA-ACNC: SIGNIFICANT CHANGE UP
GM1 GANGL IGG+IGM SER-ACNC: SIGNIFICANT CHANGE UP
GM2 GANGL IGG+IGM SER IA-ACNC: SIGNIFICANT CHANGE UP
GQ1B GANGL IGG+IGM SER IA-ACNC: SIGNIFICANT CHANGE UP
HCT VFR BLD CALC: 25.6 % — LOW (ref 34.5–45)
HGB BLD-MCNC: 7.7 G/DL — LOW (ref 11.5–15.5)
MAGNESIUM SERPL-MCNC: 1.6 MG/DL — SIGNIFICANT CHANGE UP (ref 1.6–2.6)
MCHC RBC-ENTMCNC: 30.1 GM/DL — LOW (ref 32–36)
MCHC RBC-ENTMCNC: 30.1 PG — SIGNIFICANT CHANGE UP (ref 27–34)
MCV RBC AUTO: 100 FL — SIGNIFICANT CHANGE UP (ref 80–100)
METHOD TYPE: SIGNIFICANT CHANGE UP
NRBC # BLD: 0 /100 WBCS — SIGNIFICANT CHANGE UP (ref 0–0)
ORGANISM # SPEC MICROSCOPIC CNT: SIGNIFICANT CHANGE UP
PHOSPHATE SERPL-MCNC: 3.7 MG/DL — SIGNIFICANT CHANGE UP (ref 2.5–4.5)
PLATELET # BLD AUTO: 193 K/UL — SIGNIFICANT CHANGE UP (ref 150–400)
POTASSIUM SERPL-MCNC: 4.9 MMOL/L — SIGNIFICANT CHANGE UP (ref 3.5–5.3)
POTASSIUM SERPL-SCNC: 4.9 MMOL/L — SIGNIFICANT CHANGE UP (ref 3.5–5.3)
RBC # BLD: 2.56 M/UL — LOW (ref 3.8–5.2)
RBC # FLD: 15.6 % — HIGH (ref 10.3–14.5)
SODIUM SERPL-SCNC: 143 MMOL/L — SIGNIFICANT CHANGE UP (ref 135–145)
SPECIMEN SOURCE: SIGNIFICANT CHANGE UP
WBC # BLD: 6.44 K/UL — SIGNIFICANT CHANGE UP (ref 3.8–10.5)
WBC # FLD AUTO: 6.44 K/UL — SIGNIFICANT CHANGE UP (ref 3.8–10.5)

## 2019-05-03 PROCEDURE — 99232 SBSQ HOSP IP/OBS MODERATE 35: CPT | Mod: GC

## 2019-05-03 PROCEDURE — 99223 1ST HOSP IP/OBS HIGH 75: CPT | Mod: GC

## 2019-05-03 PROCEDURE — 99233 SBSQ HOSP IP/OBS HIGH 50: CPT

## 2019-05-03 PROCEDURE — 99233 SBSQ HOSP IP/OBS HIGH 50: CPT | Mod: GC

## 2019-05-03 RX ORDER — ERTAPENEM SODIUM 1 G/1
1000 INJECTION, POWDER, LYOPHILIZED, FOR SOLUTION INTRAMUSCULAR; INTRAVENOUS EVERY 24 HOURS
Qty: 0 | Refills: 0 | Status: COMPLETED | OUTPATIENT
Start: 2019-05-03 | End: 2019-05-14

## 2019-05-03 RX ORDER — METFORMIN HYDROCHLORIDE 850 MG/1
1000 TABLET ORAL
Qty: 0 | Refills: 0 | Status: DISCONTINUED | OUTPATIENT
Start: 2019-05-03 | End: 2019-05-08

## 2019-05-03 RX ORDER — INSULIN LISPRO 100/ML
VIAL (ML) SUBCUTANEOUS
Qty: 0 | Refills: 0 | Status: DISCONTINUED | OUTPATIENT
Start: 2019-05-03 | End: 2019-05-09

## 2019-05-03 RX ORDER — TAMOXIFEN CITRATE 20 MG/1
20 TABLET, FILM COATED ORAL
Qty: 0 | Refills: 0 | Status: DISCONTINUED | OUTPATIENT
Start: 2019-05-03 | End: 2019-05-09

## 2019-05-03 RX ORDER — MAGNESIUM SULFATE 500 MG/ML
1 VIAL (ML) INJECTION ONCE
Qty: 0 | Refills: 0 | Status: COMPLETED | OUTPATIENT
Start: 2019-05-03 | End: 2019-05-03

## 2019-05-03 RX ADMIN — PANTOPRAZOLE SODIUM 40 MILLIGRAM(S): 20 TABLET, DELAYED RELEASE ORAL at 11:46

## 2019-05-03 RX ADMIN — ERTAPENEM SODIUM 120 MILLIGRAM(S): 1 INJECTION, POWDER, LYOPHILIZED, FOR SOLUTION INTRAMUSCULAR; INTRAVENOUS at 22:22

## 2019-05-03 RX ADMIN — TAMOXIFEN CITRATE 20 MILLIGRAM(S): 20 TABLET, FILM COATED ORAL at 18:11

## 2019-05-03 RX ADMIN — METFORMIN HYDROCHLORIDE 1000 MILLIGRAM(S): 850 TABLET ORAL at 18:11

## 2019-05-03 RX ADMIN — ZINC OXIDE 1 APPLICATION(S): 200 OINTMENT TOPICAL at 21:55

## 2019-05-03 RX ADMIN — ZINC OXIDE 1 APPLICATION(S): 200 OINTMENT TOPICAL at 06:52

## 2019-05-03 RX ADMIN — Medication 1 APPLICATION(S): at 13:04

## 2019-05-03 RX ADMIN — Medication 1 APPLICATION(S): at 06:59

## 2019-05-03 RX ADMIN — MEROPENEM 100 MILLIGRAM(S): 1 INJECTION INTRAVENOUS at 06:51

## 2019-05-03 RX ADMIN — CHLORHEXIDINE GLUCONATE 15 MILLILITER(S): 213 SOLUTION TOPICAL at 06:51

## 2019-05-03 RX ADMIN — CHLORHEXIDINE GLUCONATE 1 APPLICATION(S): 213 SOLUTION TOPICAL at 11:57

## 2019-05-03 RX ADMIN — Medication 1 APPLICATION(S): at 21:55

## 2019-05-03 RX ADMIN — Medication 2: at 11:45

## 2019-05-03 RX ADMIN — METFORMIN HYDROCHLORIDE 1000 MILLIGRAM(S): 850 TABLET ORAL at 14:14

## 2019-05-03 RX ADMIN — Medication 100 GRAM(S): at 08:55

## 2019-05-03 RX ADMIN — MEROPENEM 100 MILLIGRAM(S): 1 INJECTION INTRAVENOUS at 13:03

## 2019-05-03 RX ADMIN — ZINC OXIDE 1 APPLICATION(S): 200 OINTMENT TOPICAL at 16:19

## 2019-05-03 NOTE — PROGRESS NOTE ADULT - SUBJECTIVE AND OBJECTIVE BOX
INTERVAL HPI/OVERNIGHT EVENTS:  Patient with no fevers, WBC now decreased to normal, extubated and off pressors. Surveillance cultures continue to show no growth.     CONSTITUTIONAL:  Negative fever or chills, feels well, good appetite  EYES:  Negative  blurry vision or double vision  CARDIOVASCULAR:  Negative for chest pain or palpitations  RESPIRATORY:  Negative for cough, wheezing, or SOB   GASTROINTESTINAL:  Negative for nausea, vomiting, diarrhea, constipation, or abdominal pain  GENITOURINARY:  Negative frequency, urgency or dysuria  NEUROLOGIC:  No headache, confusion, dizziness, lightheadedness    ANTIBIOTICS/RELEVANT:    MEDICATIONS  (STANDING):  dextrose 5%. 1000 milliLiter(s) (50 mL/Hr) IV Continuous <Continuous>  dextrose 50% Injectable 12.5 Gram(s) IV Push once  dextrose 50% Injectable 25 Gram(s) IV Push once  dextrose 50% Injectable 25 Gram(s) IV Push once  enoxaparin Injectable 40 milliGRAM(s) SubCutaneous every 24 hours  insulin lispro (HumaLOG) corrective regimen sliding scale   SubCutaneous Before meals and at bedtime  meropenem  IVPB 1000 milliGRAM(s) IV Intermittent every 8 hours  pantoprazole   Suspension 40 milliGRAM(s) Oral daily  petrolatum Ophthalmic Ointment 1 Application(s) Both EYES three times a day  tamoxifen 20 milliGRAM(s) Oral two times a day  zinc oxide 20% Ointment 1 Application(s) Topical three times a day    MEDICATIONS  (PRN):  dextrose 40% Gel 15 Gram(s) Oral once PRN Blood Glucose LESS THAN 70 milliGRAM(s)/deciliter  glucagon  Injectable 1 milliGRAM(s) IntraMuscular once PRN Glucose LESS THAN 70 milligrams/deciliter  morphine  - Injectable 2 milliGRAM(s) IV Push every 4 hours PRN Severe Pain (7 - 10)  ondansetron Injectable 8 milliGRAM(s) IV Push every 8 hours PRN Nausea and/or Vomiting    Vital Signs Last 24 Hrs  T(C): 35.8 (03 May 2019 09:08), Max: 37.1 (02 May 2019 14:47)  T(F): 96.5 (03 May 2019 09:08), Max: 98.8 (02 May 2019 14:47)  HR: 82 (03 May 2019 12:00) (74 - 94)  BP: 118/72 (03 May 2019 12:00) (68/45 - 118/72)  BP(mean): 87 (03 May 2019 12:00) (56 - 88)  RR: 34 (03 May 2019 12:00) (14 - 34)  SpO2: 100% (03 May 2019 12:00) (94% - 100%)    PHYSICAL EXAM:  Constitutional:Well-developed, well nourished  Eyes:THOMAS, EOMI  Ear/Nose/Throat: no oral lesion, no sinus tenderness on percussion	  Neck:no JVD, no lymphadenopathy, supple  Respiratory: CTA heath, breathing comfortably on NC  Cardiovascular: S1S2 RRR, no murmurs  Gastrointestinal:soft, (+) BS, Abd NTTP  Extremities:no e/e/c  Vascular: DP Pulse: right normal; left normal    LABS:                        7.7    6.44  )-----------( 193      ( 03 May 2019 06:49 )             25.6     05-03    143  |  111<H>  |  17  ----------------------------<  123<H>  4.9   |  24  |  0.48<L>    Ca    9.1      03 May 2019 06:49  Phos  3.7     05-03  Mg     1.6     05-03    MICROBIOLOGY:    Culture - Blood (collected 01 May 2019 17:01)  Source: .Blood Blood  Preliminary Report (02 May 2019 18:00):    No growth at 1 day.    Culture - Blood (collected 30 Apr 2019 16:14)  Source: .Blood Blood-Peripheral  Preliminary Report (02 May 2019 17:00):    No growth at 2 days.    Culture - Blood (collected 30 Apr 2019 16:14)  Source: .Blood Blood-Peripheral  Preliminary Report (02 May 2019 17:00):    No growth at 2 days.    RADIOLOGY & ADDITIONAL STUDIES:

## 2019-05-03 NOTE — CONSULT NOTE ADULT - SUBJECTIVE AND OBJECTIVE BOX
Patient is a 59y old  Female who presents with a chief complaint of sepsis (03 May 2019 16:02)      HPI:  60 yo G0 w/ known stage IV endometrial carcinoma s/p staging surgery 7/2018 and 1 cycle of chemotherapy in 2/2019 followed by multiple admissions for management of symptomatic enterovaginal fistula, complex fistula associated urinary tract infection, bacteremia presents on referral from [facility] after fever today of 100.7. Pt reports poor PO intake, nausea w/ dry heaving and bringing up phlegm for the past 2 days. She also reports continued weakness. She denies chills, HA, dizziness, CP, palpitations, SOB, abdominal pain, vaginal bleeding, leakage of stool per vagina, abnormal vaginal discharge. She states she was not ambulating daily at [facility] however was working with PT there.    OB/GYN Hx: G0, stage 4 endometrial cancer dx in 7/2018; h/o breast cancer in 2009 s/p chemo and radiation with left breast lumpectomy  PMHx: T2DM, polio in childhood, h/o breast cancer  SHx: left breast lumpectomy, right knee surgery with screw placement 2001, 7/2018 exploratory laparotomy, enterolysis, SHAMIR, BSO, pelvic lymphadenectomy, low anterior resection mobilization of splenic flexure, end colostomy   Allergies: NKDA (29 Apr 2019 23:11)     note:        Patient is stated as above, but is now alert and oriented.  Patient states that she has had multiple UTI's and that she has had an indwelling catheter for a couple of months, which they just recently changed in the hospital.        Vital Signs Last 24 Hrs  T(C): 36.9 (03 May 2019 18:38), Max: 37.1 (03 May 2019 06:58)  T(F): 98.5 (03 May 2019 18:38), Max: 98.8 (03 May 2019 06:58)  HR: 88 (03 May 2019 18:00) (74 - 90)  BP: 126/68 (03 May 2019 18:00) (68/45 - 138/65)  BP(mean): 88 (03 May 2019 18:00) (56 - 99)  RR: 23 (03 May 2019 18:00) (14 - 34)  SpO2: 100% (03 May 2019 18:00) (94% - 100%)  I&O's Summary    02 May 2019 07:01  -  03 May 2019 07:00  --------------------------------------------------------  IN: 862 mL / OUT: 1370 mL / NET: -508 mL    03 May 2019 07:01  -  03 May 2019 19:18  --------------------------------------------------------  IN: 650 mL / OUT: 860 mL / NET: -210 mL        PE:  Gen: NAD, Alert and oriented x 3    Abd: soft nt/nd. midline abd scar noted. Negative B/L CVAT  : [place] cath in place draining yellow clear output.      LABS:                        7.7    6.44  )-----------( 193      ( 03 May 2019 06:49 )             25.6     05-03    143  |  111<H>  |  17  ----------------------------<  123<H>  4.9   |  24  |  0.48<L>    Ca    9.1      03 May 2019 06:49  Phos  3.7     05-03  Mg     1.6     05-03        Cultures  Culture Results:   No growth at 2 days. (05-01 @ 17:01)  Culture Results:   No growth at 3 days. (04-30 @ 16:14)  Culture Results:   No growth at 3 days. (04-30 @ 16:14)  Culture Results:   Growth in aerobic and anaerobic bottles: Escherichia coli ESBL  Result called to and read back by_ GORDY Rodrigues RN  05/02/2019 14:21:55 (04-30 @ 12:22)  Culture Results:   Growth in aerobic and anaerobic bottles: Escherichia coli ESBL  Floor previously notified. (04-30 @ 12:22)  Culture Results:   >100,000 CFU/ml Escherichia coli ESBL  Result called to and read back by_ Dr. KEELEY Vincent 05/02/2019 11:12:40 (04-30 @ 09:31)      A/P

## 2019-05-03 NOTE — CONSULT NOTE ADULT - PROBLEM SELECTOR RECOMMENDATION 7
Extensive counseling provided to patient at bedside today. She has good insight into her active disease and admits to fearing things may not get better. She admits that her sister, Esha, often speaks and makes decisions for her. Patient thinks her sister is in denial of how sick she truly is and does not understand things may not get better. Patient feels frustrated by her loss of independence given how she has historically cared for herself and is frustrated by how sick she has been despite being only 59yrs old. She expresses regret about missing out on life experiences and for never having been employed. Patient admits the CTX made her very weak the last time she received it and is ambivalent about receiving additional therapy.  - will continue providing social support to patient and family throughout stay  - patient remains FULL CODE for now; will discuss w/ gyn-onc Dr. Spivey and patient's sister Esha about continued care Extensive counseling provided to patient at bedside today. She has good insight into her active disease and admits to fearing things may not get better. She admits that her sister, Esha, often speaks and makes decisions for her. Patient thinks her sister is in denial of how sick she truly is and does not understand things may not get better. Patient feels frustrated by her loss of independence given how she has historically cared for herself and is frustrated by how sick she has been despite being only 59yrs old. She expresses regret about missing out on life experiences and for never having been employed. Patient admits the CTX made her very weak the last time she received it and is ambivalent about receiving additional therapy.  - will continue providing social support to patient and family throughout stay  - patient remains FULL CODE for now; will discuss w/ gyn-onc Dr. Spivey and patient's sister Esha about continued care  -discussed that sister too may be scared about present situation

## 2019-05-03 NOTE — PROGRESS NOTE ADULT - ASSESSMENT
58yo F HD5 with stage IV endometrial adenocarcinoma s/p staging surgery '18 and 1 round of chemotherapy 2/19 readmitted from [facility] with fever, previously admitted for management of symptomatic rectovaginal and enterovaginal fistulas. Presents on referral from [facility] with urosepsis.  Rapid response called due to worsening respiratory status and mental status, pt intubated and transferred to MICU.    1. Neuro: extubated.    CIDP s/p IVIG, next due on 5/9, also post polio contributing to weakness. Will need neuro to consult again (Dr. Sellers) regarding patient's strength. Per the MICU team who spoke with him, pt may need immunosuppression for this but cannot do that given her clinical picture.  2. Pulm: 2L NC as needed  3. Cardio: HD stable  4. FEN/GI: s/p dobhoff, now on soft diet, off IVH  5. : joseph in place. suspect urosepsis. consult urology. consider suprapubic catheter.  6. ID: meropenem, s/p vanc  7. Endocrine: FS, ISS, metformin for improved mortality when taking PO  8. VTE prophylaxis - SCDs, Lovenox 40 daily   9. GYN malignancy  -stopped anastrazole and initiated 3 weeks of megace 80mg BID followed by 3 weeks of tamoxifen. started megace and metformin 4/10. due to start tamoxifen today 5/1 but will delay until pt is taking po. will address this today. signing chemo form and will likely start Tamoxifen 20mg BID x 21 days). please also start metformin 1000mg BID for improved outcomes.  10. Derm: monitor sacrum for s/s of pressure ulcer. encourage frequent changes in position and OOB during  the day  11. social work/dispo planning as patient will go back to [facility].

## 2019-05-03 NOTE — PROGRESS NOTE ADULT - ASSESSMENT
IMPRESSION:  E. Coli bacteremia, suspect  source    Recommend:  1.  Dc meropenem, Etest showing sensitivity to Ertapenem.  2.  Recommend start Ertapenem 1g daily, to be continued through May 14th (14 days total from last negative blood culture.)   3.  Please obtain weekly CBC and CMP and will be called to make a f/u appt w/ Dr. Jenkins    ID team singing off, thank you for the opportunity to participate in the care of this patient. Please recall if needed. IMPRESSION:  E. Coli bacteremia, suspect  source    Recommend:  1.  Dc meropenem, Etest showing sensitivity to Ertapenem.  2.  Recommend start Ertapenem 1g daily, to be continued through May 14th (14 days total from last negative blood culture.)   3.  Please obtain weekly CBC and CMP and will be called to make a f/u appt w/ Dr. Lawler    ID team singing off, thank you for the opportunity to participate in the care of this patient. Please recall if needed. IMPRESSION:  E. Coli bacteremia, suspect  source    Recommend:  1.  Dc meropenem, Etest showing sensitivity to Ertapenem.  2.  Recommend start Ertapenem 1g daily, to be continued through May 14th (14 days total from last negative blood culture.)   3.  Please obtain weekly CBC and CMP and will be called to make a f/u appt w/ Dr. Lawler    ID team signing off, thank you for the opportunity to participate in the care of this patient. Please recall if needed.

## 2019-05-03 NOTE — PROGRESS NOTE ADULT - SUBJECTIVE AND OBJECTIVE BOX
INCOMPLETE NOTE    INTERVAL HPI/OVERNIGHT EVENTS: E coli is ESBL sensitive to meropenem. Extubated, OOB to chair.      SUBJECTIVE: Patient seen and examined at bedside.     ROS:  CV: Denies chest pain  Resp: Denies SOB  GI: Denies abdominal pain, constipation, diarrhea, nausea, vomiting  : Denies dysuria  ID: Denies fevers, chills  MSK: Denies joint pain     OBJECTIVE:    VITAL SIGNS:  ICU Vital Signs Last 24 Hrs  T(C): 36.1 (03 May 2019 01:03), Max: 37.5 (02 May 2019 07:00)  T(F): 96.9 (03 May 2019 01:03), Max: 99.5 (02 May 2019 07:00)  HR: 82 (03 May 2019 05:00) (74 - 94)  BP: 86/60 (03 May 2019 05:00) (86/60 - 129/83)  BP(mean): 74 (03 May 2019 05:00) (67 - 111)  ABP: --  ABP(mean): --  RR: 17 (03 May 2019 05:00) (14 - 33)  SpO2: 94% (03 May 2019 05:00) (94% - 100%)    Mode: CPAP with PS, FiO2: 30, PEEP: 5, PS: 5, MAP: 7.2, PIP: 12    05-01 @ 07:01  -  05-02 @ 07:00  --------------------------------------------------------  IN: 1728 mL / OUT: 965 mL / NET: 763 mL    05-02 @ 07:01  -  05-03 @ 06:03  --------------------------------------------------------  IN: 812 mL / OUT: 1310 mL / NET: -498 mL      CAPILLARY BLOOD GLUCOSE      POCT Blood Glucose.: 112 mg/dL (03 May 2019 06:01)      PHYSICAL EXAM:  General: elderly female in NAD, comfortable intubated on cpap  HEENT: NCAT, PERRL, clear conjunctiva, no scleral icterus, intubated  Neck: supple, no JVD  Respiratory: CTA b/l, no wheezing, rhonchi, rales, appreciated sounds from ventilator throughout  Cardiovascular: RRR, normal S1S2, no M/R/G  : joseph in place  Vascular: 2+ radial and DP pulses  Abdomen: soft, NT/ND, bowel sounds present, midline scar well healed, Colostomy bag on L side w hard brown stool.  Extremities: WWP, no clubbing, cyanosis, or edema  Skin: No rashes present      MEDICATIONS:  MEDICATIONS  (STANDING):  chlorhexidine 0.12% Liquid 15 milliLiter(s) Oral Mucosa every 12 hours  chlorhexidine 2% Cloths 1 Application(s) Topical daily  dextrose 5%. 1000 milliLiter(s) (50 mL/Hr) IV Continuous <Continuous>  dextrose 50% Injectable 12.5 Gram(s) IV Push once  dextrose 50% Injectable 25 Gram(s) IV Push once  dextrose 50% Injectable 25 Gram(s) IV Push once  enoxaparin Injectable 40 milliGRAM(s) SubCutaneous every 24 hours  insulin lispro (HumaLOG) corrective regimen sliding scale   SubCutaneous every 6 hours  meropenem  IVPB 1000 milliGRAM(s) IV Intermittent every 8 hours  pantoprazole   Suspension 40 milliGRAM(s) Oral daily  petrolatum Ophthalmic Ointment 1 Application(s) Both EYES three times a day  zinc oxide 20% Ointment 1 Application(s) Topical three times a day    MEDICATIONS  (PRN):  dextrose 40% Gel 15 Gram(s) Oral once PRN Blood Glucose LESS THAN 70 milliGRAM(s)/deciliter  glucagon  Injectable 1 milliGRAM(s) IntraMuscular once PRN Glucose LESS THAN 70 milligrams/deciliter  morphine  - Injectable 2 milliGRAM(s) IV Push every 4 hours PRN Severe Pain (7 - 10)  ondansetron Injectable 8 milliGRAM(s) IV Push every 8 hours PRN Nausea and/or Vomiting      ALLERGIES:  Allergies    No Known Allergies    Intolerances        LABS:                        7.6    7.33  )-----------( 199      ( 02 May 2019 04:57 )             24.1     05-02    141  |  111<H>  |  25<H>  ----------------------------<  198<H>  3.3<L>   |  19<L>  |  0.63    Ca    8.7      02 May 2019 04:57  Phos  1.7     05-02  Mg     2.0     05-02            RADIOLOGY & ADDITIONAL TESTS: Reviewed. TRANSFER FROM MICU TO GYN:  59 F w/ Stage IV Endometrial Carcinoma s/p staging surgery 7/2018 and 1 cycle of chemo in 2/2019, DM, low anterior resection mobilization of splenic flexure, end colostomy w/ rectovaginal fistula w/ numerous admissions for urinary tract infection and bacteremia presents on referral from [facility] with urosepsis. ICU consulted for respiratory failure with worsening mental status change and unresponsiveness. Patient has extensive UTI/Pyelonephritis history, was being treated with Ertapenem as outpatient, on admission was seen by ID who recommended Meropenam and Vancomycin. Rapid response called on day of admission for tachycardia > 130, acute mental status change, decreased responsiveness. Patient intubated while in ER holding. patient was weaned off levophed and was extubated on 5/2. Surveillance blood cultures were negative. Both urine and initial blood cx growing ESBL e coli sensitive to meropenem. Patient is now hemodynamically stable to be transferred back to GYN service.      INTERVAL HPI/OVERNIGHT EVENTS: E coli is ESBL sensitive to meropenem. Extubated, OOB to chair.      SUBJECTIVE: Patient seen and examined at bedside. No acute complaints.    ROS:  CV: Denies chest pain  Resp: Denies SOB  GI: Denies abdominal pain, constipation, diarrhea, nausea, vomiting  : Denies dysuria  ID: Denies fevers, chills  MSK: Denies joint pain     OBJECTIVE:    VITAL SIGNS:  ICU Vital Signs Last 24 Hrs  T(C): 36.1 (03 May 2019 01:03), Max: 37.5 (02 May 2019 07:00)  T(F): 96.9 (03 May 2019 01:03), Max: 99.5 (02 May 2019 07:00)  HR: 82 (03 May 2019 05:00) (74 - 94)  BP: 86/60 (03 May 2019 05:00) (86/60 - 129/83)  BP(mean): 74 (03 May 2019 05:00) (67 - 111)  ABP: --  ABP(mean): --  RR: 17 (03 May 2019 05:00) (14 - 33)  SpO2: 94% (03 May 2019 05:00) (94% - 100%)    Mode: CPAP with PS, FiO2: 30, PEEP: 5, PS: 5, MAP: 7.2, PIP: 12    05-01 @ 07:01  -  05-02 @ 07:00  --------------------------------------------------------  IN: 1728 mL / OUT: 965 mL / NET: 763 mL    05-02 @ 07:01  -  05-03 @ 06:03  --------------------------------------------------------  IN: 812 mL / OUT: 1310 mL / NET: -498 mL      CAPILLARY BLOOD GLUCOSE      POCT Blood Glucose.: 112 mg/dL (03 May 2019 06:01)      PHYSICAL EXAM:  General: elderly female in NAD, comfortable on nasal cannula  HEENT: NCAT, PERRL, clear conjunctiva, no scleral icterus  Neck: supple, no JVD  Respiratory: CTA b/l, no wheezing, rhonchi, rales  Cardiovascular: RRR, normal S1S2, no M/R/G  : joseph in place  Vascular: 2+ radial and DP pulses  Abdomen: soft, NT/ND, bowel sounds present, midline scar well healed, Colostomy bag on L side w hard brown stool and gas.  Extremities: WWP, no clubbing, cyanosis, or edema  Skin: No rashes present      MEDICATIONS:  MEDICATIONS  (STANDING):  chlorhexidine 0.12% Liquid 15 milliLiter(s) Oral Mucosa every 12 hours  chlorhexidine 2% Cloths 1 Application(s) Topical daily  dextrose 5%. 1000 milliLiter(s) (50 mL/Hr) IV Continuous <Continuous>  dextrose 50% Injectable 12.5 Gram(s) IV Push once  dextrose 50% Injectable 25 Gram(s) IV Push once  dextrose 50% Injectable 25 Gram(s) IV Push once  enoxaparin Injectable 40 milliGRAM(s) SubCutaneous every 24 hours  insulin lispro (HumaLOG) corrective regimen sliding scale   SubCutaneous every 6 hours  meropenem  IVPB 1000 milliGRAM(s) IV Intermittent every 8 hours  pantoprazole   Suspension 40 milliGRAM(s) Oral daily  petrolatum Ophthalmic Ointment 1 Application(s) Both EYES three times a day  zinc oxide 20% Ointment 1 Application(s) Topical three times a day    MEDICATIONS  (PRN):  dextrose 40% Gel 15 Gram(s) Oral once PRN Blood Glucose LESS THAN 70 milliGRAM(s)/deciliter  glucagon  Injectable 1 milliGRAM(s) IntraMuscular once PRN Glucose LESS THAN 70 milligrams/deciliter  morphine  - Injectable 2 milliGRAM(s) IV Push every 4 hours PRN Severe Pain (7 - 10)  ondansetron Injectable 8 milliGRAM(s) IV Push every 8 hours PRN Nausea and/or Vomiting      ALLERGIES:  Allergies    No Known Allergies    Intolerances        LABS:                        7.6    7.33  )-----------( 199      ( 02 May 2019 04:57 )             24.1     05-02    141  |  111<H>  |  25<H>  ----------------------------<  198<H>  3.3<L>   |  19<L>  |  0.63    Ca    8.7      02 May 2019 04:57  Phos  1.7     05-02  Mg     2.0     05-02            RADIOLOGY & ADDITIONAL TESTS: Reviewed.

## 2019-05-03 NOTE — CONSULT NOTE ADULT - PROBLEM SELECTOR RECOMMENDATION 8
Support provided to patient and family. Patient to have access to supportive services during rest of hospital stay as the patient/family deemed necessary ie. Chaplaincy, Massage therapy, Music therapy, Patient and family supportive services, Palliative SW, etc. Support provided to patient and family. Patient to have access to supportive services during rest of hospital stay as the patient/family deemed necessary ie. Chaplaincy, Massage therapy, Music therapy, Patient and family supportive services, Palliative SW, etc.   as identified during the patients PSSA screening the patient would benefit from: continued visits from Patient and Family Support counselor and massage therapy

## 2019-05-03 NOTE — PROGRESS NOTE ADULT - SUBJECTIVE AND OBJECTIVE BOX
GYN Progress Note    Patient seen at bedside.  no issues, stable overnight.   on 2L NC, may not be required during the day.  tolerating soft diet.  occasionally OOB to chair.   E coli is ESBL sensitive to meropenem 1g q 8 hrs  repleting lytes    Denies CP, palpitations, SOB, fever, chills, nausea, vomiting.    Vital Signs Last 24 Hrs  T(C): 37.1 (03 May 2019 06:58), Max: 37.1 (02 May 2019 10:00)  T(F): 98.8 (03 May 2019 06:58), Max: 98.8 (02 May 2019 10:00)  HR: 78 (03 May 2019 07:00) (74 - 94)  BP: 68/45 (03 May 2019 07:00) (68/45 - 129/83)  BP(mean): 56 (03 May 2019 07:00) (56 - 111)  RR: 26 (03 May 2019 07:00) (14 - 30)  SpO2: 100% (03 May 2019 07:00) (94% - 100%)    Physical Exam:  Gen: No Acute Distress  Pulm: no respiratory distress  GI: soft, appropriately nontender, nondistended, ostomy w/ stool and gas  Ext: SCDs in place, wnl, strength 1/5 b/l in LE    I&O's Summary    02 May 2019 07:01  -  03 May 2019 07:00  --------------------------------------------------------  IN: 862 mL / OUT: 1370 mL / NET: -508 mL      MEDICATIONS  (STANDING):  chlorhexidine 0.12% Liquid 15 milliLiter(s) Oral Mucosa every 12 hours  chlorhexidine 2% Cloths 1 Application(s) Topical daily  dextrose 5%. 1000 milliLiter(s) (50 mL/Hr) IV Continuous <Continuous>  dextrose 50% Injectable 12.5 Gram(s) IV Push once  dextrose 50% Injectable 25 Gram(s) IV Push once  dextrose 50% Injectable 25 Gram(s) IV Push once  enoxaparin Injectable 40 milliGRAM(s) SubCutaneous every 24 hours  insulin lispro (HumaLOG) corrective regimen sliding scale   SubCutaneous every 6 hours  magnesium sulfate  IVPB 1 Gram(s) IV Intermittent once  meropenem  IVPB 1000 milliGRAM(s) IV Intermittent every 8 hours  pantoprazole   Suspension 40 milliGRAM(s) Oral daily  petrolatum Ophthalmic Ointment 1 Application(s) Both EYES three times a day  zinc oxide 20% Ointment 1 Application(s) Topical three times a day    MEDICATIONS  (PRN):  dextrose 40% Gel 15 Gram(s) Oral once PRN Blood Glucose LESS THAN 70 milliGRAM(s)/deciliter  glucagon  Injectable 1 milliGRAM(s) IntraMuscular once PRN Glucose LESS THAN 70 milligrams/deciliter  morphine  - Injectable 2 milliGRAM(s) IV Push every 4 hours PRN Severe Pain (7 - 10)  ondansetron Injectable 8 milliGRAM(s) IV Push every 8 hours PRN Nausea and/or Vomiting      LABS:                        7.7    6.44  )-----------( 193      ( 03 May 2019 06:49 )             25.6     05-03    143  |  111<H>  |  17  ----------------------------<  123<H>  4.9   |  24  |  0.48<L>    Ca    9.1      03 May 2019 06:49  Phos  3.7     05-03  Mg     1.6     05-03

## 2019-05-03 NOTE — CONSULT NOTE ADULT - ASSESSMENT
Patient is 59 year old female w/ Sepsis. Patient is being stepped down from ICU to [facility], afebrile, joseph cath draining yellow/clear, and improving.  At moment no surgical intervention is needed due to patient joseph cath draining, patient not in retention, and is afebrile.  An indication for joseph placement would be if patient was in retention and unable to pass a joseph catheter into bladder.  Patient also has extensive abd and gyn surgery history, which would change approach of SPT.  Can consider IR placement of SPT tube placement.    Recs:  - No surgical intervention is needed at moment   -Will discuss further with  team and attending in the AM. Patient is 59 year old female w/ Sepsis. Patient is being stepped down from ICU to [facility], afebrile, joseph cath draining yellow/clear, and improving.  At moment no surgical intervention is needed due to patient joseph cath draining, patient not in retention, and is afebrile.  An indication for SP tube placement would be if patient was in retention and unable to pass a joseph catheter into bladder.  Patient also has extensive abd and gyn surgery history, which would change approach of SPT.  Can consider IR placement of SPT tube placement.    Recs:  - No surgical intervention is needed at moment   -Will discuss further with  team and attending in the AM.

## 2019-05-03 NOTE — CONSULT NOTE ADULT - SUBJECTIVE AND OBJECTIVE BOX
TIM MCDANIEL          MRN-[id number]            (1960)    HPI:  58 yo G0 w/ known stage IV endometrial carcinoma s/p staging surgery 2018 and 1 cycle of chemotherapy in 2019 followed by multiple admissions for management of symptomatic enterovaginal fistula, complex fistula associated urinary tract infection, bacteremia presents on referral from [facility] after fever today of 100.7. Pt reports poor PO intake, nausea w/ dry heaving and bringing up phlegm for the past 2 days. She also reports continued weakness. She denies chills, HA, dizziness, CP, palpitations, SOB, abdominal pain, vaginal bleeding, leakage of stool per vagina, abnormal vaginal discharge. She states she was not ambulating daily at [facility] however was working with PT there.    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   [place] nationality  Middle child of 5 siblings - older sister Esha very involved, helps make decisions. Brother had CVA and wheelchair bound, has aides assisting him.  Had polio in childhood  Never , no children of her own    ROS:    Unable to attain due to:                      Dyspnea (Alfredo 0-10): 5                     N/V (Y/N): N  Secretions (Y/N): N                Agitation(Y/N): N  Pain (Y/N): N  -Provocation/Palliation:  -Quality/Quantity:  -Radiating:  -Severity:  -Timing/Frequency:  -Impact on ADLs:    General: +fatigue  HEENT:    Denied  Neck:  Denied  CVS:  Denied  Resp:  Denied  GI:  Denied  :  Denied  Musc:  Denied  Neuro:  +weakness of lower extremities  Psych:  Denied  Skin:  Denied  Lymph:  Denied    Allergies    No Known Allergies    Intolerances    Opiate Naive (Y/N): Yes (prior to admission)  -iStop reviewed (Y/N): Y (Ref#: [id number] ) - no records of controlled substance prescriptions per [facility]     Medications:      MEDICATIONS  (STANDING):  dextrose 5%. 1000 milliLiter(s) (50 mL/Hr) IV Continuous <Continuous>  dextrose 50% Injectable 12.5 Gram(s) IV Push once  dextrose 50% Injectable 25 Gram(s) IV Push once  dextrose 50% Injectable 25 Gram(s) IV Push once  enoxaparin Injectable 40 milliGRAM(s) SubCutaneous every 24 hours  insulin lispro (HumaLOG) corrective regimen sliding scale   SubCutaneous Before meals and at bedtime  meropenem  IVPB 1000 milliGRAM(s) IV Intermittent every 8 hours  pantoprazole   Suspension 40 milliGRAM(s) Oral daily  petrolatum Ophthalmic Ointment 1 Application(s) Both EYES three times a day  tamoxifen 20 milliGRAM(s) Oral two times a day  zinc oxide 20% Ointment 1 Application(s) Topical three times a day    MEDICATIONS  (PRN):  dextrose 40% Gel 15 Gram(s) Oral once PRN Blood Glucose LESS THAN 70 milliGRAM(s)/deciliter  glucagon  Injectable 1 milliGRAM(s) IntraMuscular once PRN Glucose LESS THAN 70 milligrams/deciliter  morphine  - Injectable 2 milliGRAM(s) IV Push every 4 hours PRN Severe Pain (7 - 10)  ondansetron Injectable 8 milliGRAM(s) IV Push every 8 hours PRN Nausea and/or Vomiting      Labs:    CBC:                        7.7    6.44  )-----------( 193      ( 03 May 2019 06:49 )             25.6     CMP:    05-03    143  |  111<H>  |  17  ----------------------------<  123<H>  4.9   |  24  |  0.48<L>    Ca    9.1      03 May 2019 06:49  Phos  3.7     05-03  Mg     1.6     05-03    Imaging:  Reviewed    < from: CT Abdomen and Pelvis w/ IV Cont (19 @ 00:55) >  EXAM:  CT CHEST IC                          EXAM:  CT ABDOMEN AND PELVIS IC                          PROCEDURE DATE:  2019      INTERPRETATION:  CLINICAL INDICATION: 89-year-old with abdominal pain,   nausea and vomiting.    FINDINGS:CT of the chest, abdomen and pelvis was performed with the   administration of intravenous contrast. Reconstructions were performed in   the sagittal and coronal planes. Comparison is made to prior studies   dated 2019 and 3/15/2019.    Evaluation of the chest demonstrates mild enlargement of the thyroid   gland. There is moderate cardiomegaly. No pleural or pericardial   effusion. Low lung volumes secondary to multisegmental bibasilar   atelectasis, unchanged since 2019. Pulmonary venousengorgement.   Enlargement of main pulmonary artery measuring 3.4 cm, consistent with   pulmonary arterial hypertension. Ascending thoracic aorta measures   maximally 3.6 cm the level of the main pulmonary artery. No gerardo central   pulmonary embolus. Negative for intraluminal thrombus within the left   atrial appendage.     Evaluation of the abdomen demonstrates that the bilateral adrenal glands   and pancreas are unremarkable. Gallbladder is collapsed and thick-walled.   There has been intervaldevelopment of hypoperfusion of the upper pole   and interpolar region of the left kidney which is enlarged with interval   development of surrounding perirenal edematous infiltration. There has   been interval development of circumferential mucosal enhancement of the   left ureter. No interval change in nonobstructing calculus within the   interpolar region of the left kidney measuring 7 mm. Mild periportal   edema. Evaluation of the gastrointestinal tract demonstrates postsurgical   changes of the anal canal with lower quadrant colostomy. No bowel   thickening. No bowel obstruction. Evaluation of the pelvis demonstrates   no interval change in severe circumferential mural thickening of the   bladder with significant perivesicular edematous infiltration and   interval development of mucosal enhancement. Joseph catheter balloon   within the bladder. Hysterectomy. No free fluid. Intervally increasing   clustered prominent lymph nodes in the left retroperitoneum at the level   of the left kidney measuring up to 14 mm, previously measuring 11 mm. No   interval change in clustered soft tissue nodules in the presacral region   measuring up to 1.8 cm. Small volume of free pelvic fluid in the   presacral region. No significant vascular calcification. Opacified   aspects of the hepatic, portal, splenic, superior mesenteric vein,   bilateral renal veins, IVC and bilateral iliac veins demonstrates no   gerardo intraluminal thrombus. Evaluation of the osseous structures   demonstrates degenerative change of the lower lumbar spine with no   destructive osseous lesion.    IMPRESSION:    Interval development of left pyelonephritis and progressive   cystitis/ureteritis.    "Thank you for the opportunity to participate in the care ofthis   patient."    PRITESH DANGELO M.D., RADIOLOGY RESIDENT  This document has been electronically signed.  KOFFI NUNEZ M.D., ATTENDING RADIOLOGIST  This document has been electronically signed. 2019  8:58AM      < end of copied text >    Culture - Blood (19 @ 12:22)    -  Multidrug (KPC pos) resistant organism: Nondet    -  Escherichia coli: Detec    -  Ampicillin: R >16 These ampicillin results predict results for amoxicillin    Gram Stain:   Aerobic and Anaerobic Bottle: Gram Negative Rods  Result called to and read back by_ Ms. SHIRA Carlson RN([id number]) 2019  20:57:59    -  Meropenem: S 0.047    -  Piperacillin/Tazobactam: R <=8    -  Tobramycin: R >8    -  Trimethoprim/Sulfamethoxazole: S <=0.5/9.5    -  Ciprofloxacin: R >32    -  Gentamicin: R >8    -  Ampicillin/Sulbactam: R >16/8    -  Amikacin: S <=8    -  Cefazolin: R >16    -  Ceftriaxone: R >32 Enterobacter, Citrobacter, and Serratia may develop resistance during prolonged therapy    Specimen Source: .Blood Blood-Venous    Organism: Blood Culture PCR    Organism: Escherichia coli ESBL    Organism: Escherichia coli ESBL    Culture Results:   Growth in aerobic and anaerobic bottles: Escherichia coli ESBL  Floor previously notified.  Culture in progress    Organism Identification: Escherichia coli ESBL  Escherichia coli ESBL  Blood Culture PCR    Method Type: PCR    Method Type: RAJIV    Method Type: ETEST    PEx:  T(C): 35.8 (19 @ 09:08), Max: 37.1 (19 @ 14:47)  HR: 82 (19 @ 12:00) (74 - 94)  BP: 118/72 (19 @ 12:00) (68/45 - 118/72)  RR: 34 (19 @ 12:00) (14 - 34)  SpO2: 100% (19 @ 12:00) (94% - 100%)  Wt(kg): --  Daily     Daily   CAPILLARY BLOOD GLUCOSE    POCT Blood Glucose.: 182 mg/dL (03 May 2019 11:26)    I&O's Summary    02 May 2019 07:  -  03 May 2019 07:00  --------------------------------------------------------  IN: 862 mL / OUT: 1370 mL / NET: -508 mL    03 May 2019 07:01  -  03 May 2019 13:14  --------------------------------------------------------  IN: 600 mL / OUT: 400 mL / NET: 200 mL    General: frail female sitting up in chair, appears older than stated age, no apparent distress  HEENT: moderate alopecia; conjunctival pallor; nasal cannula in place; hypophonic speech, moist mucous membranes  Neck: supple  CVS: S1/S2, RRR  Resp: on nasal cannula, speaks short sentences but not in distress or using accessory muscles  GI: soft, non-tender  : +joseph draining clear yellow urine  Musc: hypotrophic muscularity and bulk, particularly of UE/hands, LE; profound kyphoscoliosis  Neuro: awake and alert  Psych: flat, depressed affect; pleasant  Skin: intact  Lymph: no LAD  Preadmit Karnofsky:  50%           Current Karnofsky:     40%  Cachexia (Y/N): Y  BMI: 24.3    Advanced Directives:     Full Code    Decision maker: Esha Avila [phone]  Legal surrogate:     GOALS OF CARE DISCUSSION       Palliative care info/counseling provided	           Family meeting       Advanced Directives addressed please see Advance Care Planning Note	           See previous Palliative Medicine Note       Documentation of GOC: FULL CODE          REFERRALS	        Palliative Med        Unit SW/Case Mgmt        -- declined       Speech/Swallow -- passed dysphagia screen       Patient/Family Support       Massage Therapy       Music Therapy       Hospice -- deferred today       Nutrition -- dietician evaluated       Ethics       PT/OT TIM MCDANIEL          MRN-[id number]            (1960)    HPI:  58 yo G0 w/ known stage IV endometrial carcinoma s/p staging surgery 2018 and 1 cycle of chemotherapy in 2019 followed by multiple admissions for management of symptomatic enterovaginal fistula, complex fistula associated urinary tract infection, bacteremia presents on referral from [facility] after fever today of 100.7. Pt reports poor PO intake, nausea w/ dry heaving and bringing up phlegm for the past 2 days. She also reports continued weakness. She denies chills, HA, dizziness, CP, palpitations, SOB, abdominal pain, vaginal bleeding, leakage of stool per vagina, abnormal vaginal discharge. She states she was not ambulating daily at [facility] however was working with PT there.    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 "I was walking until 2018"   [place] nationality  Middle child of 5 siblings - older sister Esha very involved, helps make decisions. Brother had CVA and wheelchair bound, has aides assisting him. 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 particularly of LE  Never , no children of her own    ROS:                         Dyspnea (Alfredo 0-10): 5                     N/V (Y/N): N  Secretions (Y/N): N                Agitation(Y/N): N  Pain (Y/N): N  -Provocation/Palliation:  -Quality/Quantity:  -Radiating:  -Severity:  -Timing/Frequency:  -Impact on ADLs:    General: +fatigue  HEENT:    Denied  Neck:  Denied  CVS:  Denied  Resp:  Denied  GI:  Denied  :  chronic Joseph since pelvic surgery  Musc:  Denied  Neuro:  +weakness of lower extremities most pronounced since 2018  Psych:  Denied  Skin:  Denied  Lymph:  Denied    Allergies    No Known Allergies    Intolerances    Opiate Naive (Y/N): Yes (prior to admission)  -iStop reviewed (Y/N): Y (Ref#: [id number] ) - no records of controlled substance prescriptions per [facility]     Medications:      MEDICATIONS  (STANDING):  dextrose 5%. 1000 milliLiter(s) (50 mL/Hr) IV Continuous <Continuous>  dextrose 50% Injectable 12.5 Gram(s) IV Push once  dextrose 50% Injectable 25 Gram(s) IV Push once  dextrose 50% Injectable 25 Gram(s) IV Push once  enoxaparin Injectable 40 milliGRAM(s) SubCutaneous every 24 hours  insulin lispro (HumaLOG) corrective regimen sliding scale   SubCutaneous Before meals and at bedtime  meropenem  IVPB 1000 milliGRAM(s) IV Intermittent every 8 hours  pantoprazole   Suspension 40 milliGRAM(s) Oral daily  petrolatum Ophthalmic Ointment 1 Application(s) Both EYES three times a day  tamoxifen 20 milliGRAM(s) Oral two times a day  zinc oxide 20% Ointment 1 Application(s) Topical three times a day    MEDICATIONS  (PRN):  dextrose 40% Gel 15 Gram(s) Oral once PRN Blood Glucose LESS THAN 70 milliGRAM(s)/deciliter  glucagon  Injectable 1 milliGRAM(s) IntraMuscular once PRN Glucose LESS THAN 70 milligrams/deciliter  morphine  - Injectable 2 milliGRAM(s) IV Push every 4 hours PRN Severe Pain (7 - 10)  ondansetron Injectable 8 milliGRAM(s) IV Push every 8 hours PRN Nausea and/or Vomiting      Labs:    CBC:                        7.7    6.44  )-----------( 193      ( 03 May 2019 06:49 )             25.6     CMP:    05-03    143  |  111<H>  |  17  ----------------------------<  123<H>  4.9   |  24  |  0.48<L>    Ca    9.1      03 May 2019 06:49  Phos  3.7     05-03  Mg     1.6     05-03    Imaging:  Reviewed    < from: CT Abdomen and Pelvis w/ IV Cont (19 @ 00:55) >  EXAM:  CT CHEST IC                          EXAM:  CT ABDOMEN AND PELVIS IC                          PROCEDURE DATE:  2019      INTERPRETATION:  CLINICAL INDICATION: 89-year-old with abdominal pain,   nausea and vomiting.    FINDINGS:CT of the chest, abdomen and pelvis was performed with the   administration of intravenous contrast. Reconstructions were performed in   the sagittal and coronal planes. Comparison is made to prior studies   dated 2019 and 3/15/2019.    Evaluation of the chest demonstrates mild enlargement of the thyroid   gland. There is moderate cardiomegaly. No pleural or pericardial   effusion. Low lung volumes secondary to multisegmental bibasilar   atelectasis, unchanged since 2019. Pulmonary venous engorgement.   Enlargement of main pulmonary artery measuring 3.4 cm, consistent with   pulmonary arterial hypertension. Ascending thoracic aorta measures   maximally 3.6 cm the level of the main pulmonary artery. No gerardo central   pulmonary embolus. Negative for intraluminal thrombus within the left   atrial appendage.     Evaluation of the abdomen demonstrates that the bilateral adrenal glands   and pancreas are unremarkable. Gallbladder is collapsed and thick-walled.   There has been interval development of hypoperfusion of the upper pole   and interpolar region of the left kidney which is enlarged with interval   development of surrounding perirenal edematous infiltration. There has   been interval development of circumferential mucosal enhancement of the   left ureter. No interval change in nonobstructing calculus within the   interpolar region of the left kidney measuring 7 mm. Mild periportal   edema. Evaluation of the gastrointestinal tract demonstrates postsurgical   changes of the anal canal with lower quadrant colostomy. No bowel   thickening. No bowel obstruction. Evaluation of the pelvis demonstrates   no interval change in severe circumferential mural thickening of the   bladder with significant perivesicular edematous infiltration and   interval development of mucosal enhancement. Joseph catheter balloon   within the bladder. Hysterectomy. No free fluid. Intervally increasing   clustered prominent lymph nodes in the left retroperitoneum at the level   of the left kidney measuring up to 14 mm, previously measuring 11 mm. No   interval change in clustered soft tissue nodules in the presacral region   measuring up to 1.8 cm. Small volume of free pelvic fluid in the   presacral region. No significant vascular calcification. Opacified   aspects of the hepatic, portal, splenic, superior mesenteric vein,   bilateral renal veins, IVC and bilateral iliac veins demonstrates no   gerardo intraluminal thrombus. Evaluation of the osseous structures   demonstrates degenerative change of the lower lumbar spine with no   destructive osseous lesion.    IMPRESSION:    Interval development of left pyelonephritis and progressive   cystitis/ureteritis.    "Thank you for the opportunity to participate in the care ofthis   patient."    PRITESH DANGELO M.D., RADIOLOGY RESIDENT  This document has been electronically signed.  KOFFI NUNEZ M.D., ATTENDING RADIOLOGIST  This document has been electronically signed. 2019  8:58AM      < end of copied text >    Culture - Blood (19 @ 12:22)    -  Multidrug (KPC pos) resistant organism: Nondet    -  Escherichia coli: Detec    -  Ampicillin: R >16 These ampicillin results predict results for amoxicillin    Gram Stain:   Aerobic and Anaerobic Bottle: Gram Negative Rods  Result called to and read back by_ Ms. SHIRA Carlson RN([id number]) 2019  20:57:59    -  Meropenem: S 0.047    -  Piperacillin/Tazobactam: R <=8    -  Tobramycin: R >8    -  Trimethoprim/Sulfamethoxazole: S <=0.5/9.5    -  Ciprofloxacin: R >32    -  Gentamicin: R >8    -  Ampicillin/Sulbactam: R >16/8    -  Amikacin: S <=8    -  Cefazolin: R >16    -  Ceftriaxone: R >32 Enterobacter, Citrobacter, and Serratia may develop resistance during prolonged therapy    Specimen Source: .Blood Blood-Venous    Organism: Blood Culture PCR    Organism: Escherichia coli ESBL    Organism: Escherichia coli ESBL    Culture Results:   Growth in aerobic and anaerobic bottles: Escherichia coli ESBL  Floor previously notified.  Culture in progress    Organism Identification: Escherichia coli ESBL  Escherichia coli ESBL  Blood Culture PCR    Method Type: PCR    Method Type: RAJIV    Method Type: ETEST    PEx:  T(C): 35.8 (19 @ 09:08), Max: 37.1 (19 @ 14:47)  HR: 82 (19 @ 12:00) (74 - 94)  BP: 118/72 (19 @ 12:00) (68/45 - 118/72)  RR: 34 (19 @ 12:00) (14 - 34)  SpO2: 100% (19 @ 12:00) (94% - 100%)  Wt(kg): --  Daily     Daily   CAPILLARY BLOOD GLUCOSE    POCT Blood Glucose.: 182 mg/dL (03 May 2019 11:26)    I&O's Summary    02 May 2019 07:  -  03 May 2019 07:00  --------------------------------------------------------  IN: 862 mL / OUT: 1370 mL / NET: -508 mL    03 May 2019 07:  -  03 May 2019 13:14  --------------------------------------------------------  IN: 600 mL / OUT: 400 mL / NET: 200 mL    General: frail female sitting up in chair, appears older than stated age, no apparent distress  HEENT: moderate alopecia; conjunctival pallor; nasal cannula in place; hypophonic speech, moist mucous membranes  Neck: supple  CVS: S1/S2, RRR  Resp: on nasal cannula, speaks short sentences but not in distress or using accessory muscles  GI: soft, non-tender  : +joseph draining clear yellow urine  Musc: hypotrophic muscularity and bulk, particularly of UE/hands, LE; profound kyphoscoliosis  Neuro: awake and alert  Psych: flat, depressed affect; pleasant  Skin: intact  Lymph: no LAD  Preadmit Karnofsky:  50%           Current Karnofsky:     40%  Cachexia (Y/N): Y  BMI: 24.3    Advanced Directives:     Full Code    Decision maker: Esha Avila [phone]  Legal surrogate:     GOALS OF CARE DISCUSSION       Palliative care info/counseling provided	           Family meeting       Advanced Directives addressed please see Advance Care Planning Note	           See previous Palliative Medicine Note       Documentation of GOC: FULL CODE          REFERRALS	        Palliative Med        Unit SW/Case Mgmt        -- declined       Speech/Swallow -- passed dysphagia screen       Patient/Family Support       Massage Therapy       Music Therapy       Hospice -- deferred today       Nutrition -- dietician evaluated       Ethics       PT/OT TIM MCDANIEL          MRN-[id number]            (1960)    HPI:  58 yo G0 w/ known stage IV endometrial carcinoma s/p staging surgery 2018 and 1 cycle of chemotherapy in 2019 followed by multiple admissions for management of symptomatic enterovaginal fistula, complex fistula associated urinary tract infection, bacteremia presents on referral from [facility] after fever today of 100.7. Pt reports poor PO intake, nausea w/ dry heaving and bringing up phlegm for the past 2 days. She also reports continued weakness. She denies chills, HA, dizziness, CP, palpitations, SOB, abdominal pain, vaginal bleeding, leakage of stool per vagina, abnormal vaginal discharge. She states she was not ambulating daily at [facility] however was working with PT there.    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 "I was walking until 2018"   [place] nationality  Middle child of 5 siblings - older sister Esha very involved, helps make decisions. Brother had CVA and wheelchair bound, has aides assisting him. 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 particularly of LE  Never , no children of her own    ROS:                         Dyspnea (Alfredo 0-10): 5                     N/V (Y/N): N  Secretions (Y/N): N                Agitation(Y/N): N  Pain (Y/N): N  -Provocation/Palliation:  -Quality/Quantity:  -Radiating:  -Severity:  -Timing/Frequency:  -Impact on ADLs:    General: +fatigue  HEENT:    Denied  Neck:  Denied  CVS:  Denied  Resp:  Denied  GI:  Denied  :  chronic Joseph since pelvic surgery  Musc:  Denied  Neuro:  +weakness of lower extremities most pronounced since 2018  Psych:  Denied  Skin:  Denied  Lymph:  Denied    Allergies    No Known Allergies    Intolerances    Opiate Naive (Y/N): Yes (prior to admission)  -iStop reviewed (Y/N): Y (Ref#: [id number] ) - no records of controlled substance prescriptions per [facility]     Medications:      MEDICATIONS  (STANDING):  dextrose 5%. 1000 milliLiter(s) (50 mL/Hr) IV Continuous <Continuous>  dextrose 50% Injectable 12.5 Gram(s) IV Push once  dextrose 50% Injectable 25 Gram(s) IV Push once  dextrose 50% Injectable 25 Gram(s) IV Push once  enoxaparin Injectable 40 milliGRAM(s) SubCutaneous every 24 hours  insulin lispro (HumaLOG) corrective regimen sliding scale   SubCutaneous Before meals and at bedtime  meropenem  IVPB 1000 milliGRAM(s) IV Intermittent every 8 hours  pantoprazole   Suspension 40 milliGRAM(s) Oral daily  petrolatum Ophthalmic Ointment 1 Application(s) Both EYES three times a day  tamoxifen 20 milliGRAM(s) Oral two times a day  zinc oxide 20% Ointment 1 Application(s) Topical three times a day    MEDICATIONS  (PRN):  dextrose 40% Gel 15 Gram(s) Oral once PRN Blood Glucose LESS THAN 70 milliGRAM(s)/deciliter  glucagon  Injectable 1 milliGRAM(s) IntraMuscular once PRN Glucose LESS THAN 70 milligrams/deciliter  morphine  - Injectable 2 milliGRAM(s) IV Push every 4 hours PRN Severe Pain (7 - 10)  ondansetron Injectable 8 milliGRAM(s) IV Push every 8 hours PRN Nausea and/or Vomiting      Labs:    CBC:                        7.7    6.44  )-----------( 193      ( 03 May 2019 06:49 )             25.6     CMP:    05-03    143  |  111<H>  |  17  ----------------------------<  123<H>  4.9   |  24  |  0.48<L>    Ca    9.1      03 May 2019 06:49  Phos  3.7     05-03  Mg     1.6     05-03    Imaging:  Reviewed    < from: CT Abdomen and Pelvis w/ IV Cont (19 @ 00:55) >  EXAM:  CT CHEST IC                          EXAM:  CT ABDOMEN AND PELVIS IC                          PROCEDURE DATE:  2019      INTERPRETATION:  CLINICAL INDICATION: 89-year-old with abdominal pain,   nausea and vomiting.    FINDINGS:CT of the chest, abdomen and pelvis was performed with the   administration of intravenous contrast. Reconstructions were performed in   the sagittal and coronal planes. Comparison is made to prior studies   dated 2019 and 3/15/2019.    Evaluation of the chest demonstrates mild enlargement of the thyroid   gland. There is moderate cardiomegaly. No pleural or pericardial   effusion. Low lung volumes secondary to multisegmental bibasilar   atelectasis, unchanged since 2019. Pulmonary venous engorgement.   Enlargement of main pulmonary artery measuring 3.4 cm, consistent with   pulmonary arterial hypertension. Ascending thoracic aorta measures   maximally 3.6 cm the level of the main pulmonary artery. No gerardo central   pulmonary embolus. Negative for intraluminal thrombus within the left   atrial appendage.     Evaluation of the abdomen demonstrates that the bilateral adrenal glands   and pancreas are unremarkable. Gallbladder is collapsed and thick-walled.   There has been interval development of hypoperfusion of the upper pole   and interpolar region of the left kidney which is enlarged with interval   development of surrounding perirenal edematous infiltration. There has   been interval development of circumferential mucosal enhancement of the   left ureter. No interval change in nonobstructing calculus within the   interpolar region of the left kidney measuring 7 mm. Mild periportal   edema. Evaluation of the gastrointestinal tract demonstrates postsurgical   changes of the anal canal with lower quadrant colostomy. No bowel   thickening. No bowel obstruction. Evaluation of the pelvis demonstrates   no interval change in severe circumferential mural thickening of the   bladder with significant perivesicular edematous infiltration and   interval development of mucosal enhancement. Joseph catheter balloon   within the bladder. Hysterectomy. No free fluid. Intervally increasing   clustered prominent lymph nodes in the left retroperitoneum at the level   of the left kidney measuring up to 14 mm, previously measuring 11 mm. No   interval change in clustered soft tissue nodules in the presacral region   measuring up to 1.8 cm. Small volume of free pelvic fluid in the   presacral region. No significant vascular calcification. Opacified   aspects of the hepatic, portal, splenic, superior mesenteric vein,   bilateral renal veins, IVC and bilateral iliac veins demonstrates no   gerardo intraluminal thrombus. Evaluation of the osseous structures   demonstrates degenerative change of the lower lumbar spine with no   destructive osseous lesion.    IMPRESSION:    Interval development of left pyelonephritis and progressive   cystitis/ureteritis.    "Thank you for the opportunity to participate in the care ofthis   patient."    PRITESH DANGELO M.D., RADIOLOGY RESIDENT  This document has been electronically signed.  KOFFI NUNEZ M.D., ATTENDING RADIOLOGIST  This document has been electronically signed. 2019  8:58AM      < end of copied text >    Culture - Blood (19 @ 12:22)    -  Multidrug (KPC pos) resistant organism: Nondet    -  Escherichia coli: Detec    -  Ampicillin: R >16 These ampicillin results predict results for amoxicillin    Gram Stain:   Aerobic and Anaerobic Bottle: Gram Negative Rods  Result called to and read back by_ Ms. SHIRA Carlson RN([id number]) 2019  20:57:59    -  Meropenem: S 0.047    -  Piperacillin/Tazobactam: R <=8    -  Tobramycin: R >8    -  Trimethoprim/Sulfamethoxazole: S <=0.5/9.5    -  Ciprofloxacin: R >32    -  Gentamicin: R >8    -  Ampicillin/Sulbactam: R >16/8    -  Amikacin: S <=8    -  Cefazolin: R >16    -  Ceftriaxone: R >32 Enterobacter, Citrobacter, and Serratia may develop resistance during prolonged therapy    Specimen Source: .Blood Blood-Venous    Organism: Blood Culture PCR    Organism: Escherichia coli ESBL    Organism: Escherichia coli ESBL    Culture Results:   Growth in aerobic and anaerobic bottles: Escherichia coli ESBL  Floor previously notified.  Culture in progress    Organism Identification: Escherichia coli ESBL  Escherichia coli ESBL  Blood Culture PCR    Method Type: PCR    Method Type: RAJIV    Method Type: ETEST    PEx:  T(C): 35.8 (19 @ 09:08), Max: 37.1 (19 @ 14:47)  HR: 82 (19 @ 12:00) (74 - 94)  BP: 118/72 (19 @ 12:00) (68/45 - 118/72)  RR: 34 (19 @ 12:00) (14 - 34)  SpO2: 100% (19 @ 12:00) (94% - 100%)  Wt(kg): --  Daily     Daily   CAPILLARY BLOOD GLUCOSE    POCT Blood Glucose.: 182 mg/dL (03 May 2019 11:26)    I&O's Summary    02 May 2019 07:  -  03 May 2019 07:00  --------------------------------------------------------  IN: 862 mL / OUT: 1370 mL / NET: -508 mL    03 May 2019 07:  -  03 May 2019 13:14  --------------------------------------------------------  IN: 600 mL / OUT: 400 mL / NET: 200 mL    General: frail female sitting up in chair, appears older than stated age, no apparent distress  HEENT: moderate alopecia; conjunctival pallor; nasal cannula in place; hypophonic speech, moist mucous membranes  Neck: supple  CVS: S1/S2, RRR  Resp: on nasal cannula, speaks short sentences but not in distress or using accessory muscles,   GI: soft, non-tender  : +joseph draining clear yellow urine  Musc: hypotrophic muscularity and bulk, particularly of UE/hands, LE; profound kyphoscoliosis muscle wasting of left trapezius and latissimus dorsi  Neuro: awake and alert  Psych: flat, depressed affect; pleasant  Skin: intact  Lymph: no LAD  Preadmit Karnofsky:  50%           Current Karnofsky:     40%  Cachexia (Y/N): Y  BMI: 24.3    Advanced Directives:     Full Code    Decision maker: Esha Avila [phone]  Legal surrogate:     GOALS OF CARE DISCUSSION       Palliative care info/counseling provided	           Family meeting       Advanced Directives addressed please see Advance Care Planning Note	           See previous Palliative Medicine Note       Documentation of GOC: FULL CODE          REFERRALS	        Palliative Med        Unit SW/Case Mgmt        -- declined       Speech/Swallow -- passed dysphagia screen       Patient/Family Support       Massage Therapy       Music Therapy       Hospice -- deferred today       Nutrition -- dietician evaluated       Ethics       PT/OT TIM MCDANIEL          MRN-[id number]            (1960)    HPI:  60 yo G0 w/ known stage IV endometrial carcinoma s/p staging surgery 2018 and 1 cycle of chemotherapy in 2019 followed by multiple admissions for management of symptomatic enterovaginal fistula, complex fistula associated urinary tract infection, bacteremia presents on referral from [facility] after fever today of 100.7. Pt reports poor PO intake, nausea w/ dry heaving and bringing up phlegm for the past 2 days. She also reports continued weakness. She denies chills, HA, dizziness, CP, palpitations, SOB, abdominal pain, vaginal bleeding, leakage of stool per vagina, abnormal vaginal discharge. She states she was not ambulating daily at [facility] however was working with PT there.    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 "I was walking until 2018"   [place] nationality  Middle child of 5 siblings - older sister Esha very involved, helps make decisions. Brother had CVA and wheelchair bound, has aides assisting him. 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 particularly of LE  Never , no children of her own    ROS:                         Dyspnea (Alfredo 0-10): 5                     N/V (Y/N): N  Secretions (Y/N): N                Agitation(Y/N): N  Pain (Y/N): N  -Provocation/Palliation:  -Quality/Quantity:  -Radiating:  -Severity:  -Timing/Frequency:  -Impact on ADLs:    General: +fatigue  HEENT:    Denied  Neck:  Denied  CVS:  Denied  Resp:  Denied  GI:  Denied  :  chronic Joseph since pelvic surgery  Musc:  Denied  Neuro:  +weakness of lower extremities most pronounced since 2018  Psych:  Denied  Skin:  Denied  Lymph:  Denied    Allergies    No Known Allergies    Intolerances    Opiate Naive (Y/N): Yes (prior to admission)  -iStop reviewed (Y/N): Y (Ref#: [id number] ) - no records of controlled substance prescriptions per [facility]     Medications:      MEDICATIONS  (STANDING):  dextrose 5%. 1000 milliLiter(s) (50 mL/Hr) IV Continuous <Continuous>  dextrose 50% Injectable 12.5 Gram(s) IV Push once  dextrose 50% Injectable 25 Gram(s) IV Push once  dextrose 50% Injectable 25 Gram(s) IV Push once  enoxaparin Injectable 40 milliGRAM(s) SubCutaneous every 24 hours  insulin lispro (HumaLOG) corrective regimen sliding scale   SubCutaneous Before meals and at bedtime  meropenem  IVPB 1000 milliGRAM(s) IV Intermittent every 8 hours  pantoprazole   Suspension 40 milliGRAM(s) Oral daily  petrolatum Ophthalmic Ointment 1 Application(s) Both EYES three times a day  tamoxifen 20 milliGRAM(s) Oral two times a day  zinc oxide 20% Ointment 1 Application(s) Topical three times a day    MEDICATIONS  (PRN):  dextrose 40% Gel 15 Gram(s) Oral once PRN Blood Glucose LESS THAN 70 milliGRAM(s)/deciliter  glucagon  Injectable 1 milliGRAM(s) IntraMuscular once PRN Glucose LESS THAN 70 milligrams/deciliter  morphine  - Injectable 2 milliGRAM(s) IV Push every 4 hours PRN Severe Pain (7 - 10)  ondansetron Injectable 8 milliGRAM(s) IV Push every 8 hours PRN Nausea and/or Vomiting      Labs:    CBC:                        7.7    6.44  )-----------( 193      ( 03 May 2019 06:49 )             25.6     CMP:    05-03    143  |  111<H>  |  17  ----------------------------<  123<H>  4.9   |  24  |  0.48<L>    Ca    9.1      03 May 2019 06:49  Phos  3.7     05-03  Mg     1.6     05-03    Imaging:  Reviewed    < from: CT Abdomen and Pelvis w/ IV Cont (19 @ 00:55) >  EXAM:  CT CHEST IC                          EXAM:  CT ABDOMEN AND PELVIS IC                          PROCEDURE DATE:  2019      INTERPRETATION:  CLINICAL INDICATION: 89-year-old with abdominal pain,   nausea and vomiting.    FINDINGS:CT of the chest, abdomen and pelvis was performed with the   administration of intravenous contrast. Reconstructions were performed in   the sagittal and coronal planes. Comparison is made to prior studies   dated 2019 and 3/15/2019.    Evaluation of the chest demonstrates mild enlargement of the thyroid   gland. There is moderate cardiomegaly. No pleural or pericardial   effusion. Low lung volumes secondary to multisegmental bibasilar   atelectasis, unchanged since 2019. Pulmonary venous engorgement.   Enlargement of main pulmonary artery measuring 3.4 cm, consistent with   pulmonary arterial hypertension. Ascending thoracic aorta measures   maximally 3.6 cm the level of the main pulmonary artery. No gerardo central   pulmonary embolus. Negative for intraluminal thrombus within the left   atrial appendage.     Evaluation of the abdomen demonstrates that the bilateral adrenal glands   and pancreas are unremarkable. Gallbladder is collapsed and thick-walled.   There has been interval development of hypoperfusion of the upper pole   and interpolar region of the left kidney which is enlarged with interval   development of surrounding perirenal edematous infiltration. There has   been interval development of circumferential mucosal enhancement of the   left ureter. No interval change in nonobstructing calculus within the   interpolar region of the left kidney measuring 7 mm. Mild periportal   edema. Evaluation of the gastrointestinal tract demonstrates postsurgical   changes of the anal canal with lower quadrant colostomy. No bowel   thickening. No bowel obstruction. Evaluation of the pelvis demonstrates   no interval change in severe circumferential mural thickening of the   bladder with significant perivesicular edematous infiltration and   interval development of mucosal enhancement. Joseph catheter balloon   within the bladder. Hysterectomy. No free fluid. Intervally increasing   clustered prominent lymph nodes in the left retroperitoneum at the level   of the left kidney measuring up to 14 mm, previously measuring 11 mm. No   interval change in clustered soft tissue nodules in the presacral region   measuring up to 1.8 cm. Small volume of free pelvic fluid in the   presacral region. No significant vascular calcification. Opacified   aspects of the hepatic, portal, splenic, superior mesenteric vein,   bilateral renal veins, IVC and bilateral iliac veins demonstrates no   gerardo intraluminal thrombus. Evaluation of the osseous structures   demonstrates degenerative change of the lower lumbar spine with no   destructive osseous lesion.    IMPRESSION:    Interval development of left pyelonephritis and progressive   cystitis/ureteritis.    "Thank you for the opportunity to participate in the care ofthis   patient."    PRITESH DANGELO M.D., RADIOLOGY RESIDENT  This document has been electronically signed.  KOFFI NUNEZ M.D., ATTENDING RADIOLOGIST  This document has been electronically signed. 2019  8:58AM      < end of copied text >    Culture - Blood (19 @ 12:22)    -  Multidrug (KPC pos) resistant organism: Nondet    -  Escherichia coli: Detec    -  Ampicillin: R >16 These ampicillin results predict results for amoxicillin    Gram Stain:   Aerobic and Anaerobic Bottle: Gram Negative Rods  Result called to and read back by_ Ms. SHIRA Carlson RN([id number]) 2019  20:57:59    -  Meropenem: S 0.047    -  Piperacillin/Tazobactam: R <=8    -  Tobramycin: R >8    -  Trimethoprim/Sulfamethoxazole: S <=0.5/9.5    -  Ciprofloxacin: R >32    -  Gentamicin: R >8    -  Ampicillin/Sulbactam: R >16/8    -  Amikacin: S <=8    -  Cefazolin: R >16    -  Ceftriaxone: R >32 Enterobacter, Citrobacter, and Serratia may develop resistance during prolonged therapy    Specimen Source: .Blood Blood-Venous    Organism: Blood Culture PCR    Organism: Escherichia coli ESBL    Organism: Escherichia coli ESBL    Culture Results:   Growth in aerobic and anaerobic bottles: Escherichia coli ESBL  Floor previously notified.  Culture in progress    Organism Identification: Escherichia coli ESBL  Escherichia coli ESBL  Blood Culture PCR    Method Type: PCR    Method Type: RAJIV    Method Type: ETEST    PEx:  T(C): 35.8 (19 @ 09:08), Max: 37.1 (19 @ 14:47)  HR: 82 (19 @ 12:00) (74 - 94)  BP: 118/72 (19 @ 12:00) (68/45 - 118/72)  RR: 34 (19 @ 12:00) (14 - 34)  SpO2: 100% (19 @ 12:00) (94% - 100%)  Wt(kg): --  Daily     Daily   CAPILLARY BLOOD GLUCOSE    POCT Blood Glucose.: 182 mg/dL (03 May 2019 11:26)    I&O's Summary    02 May 2019 07:  -  03 May 2019 07:00  --------------------------------------------------------  IN: 862 mL / OUT: 1370 mL / NET: -508 mL    03 May 2019 07:  -  03 May 2019 13:14  --------------------------------------------------------  IN: 600 mL / OUT: 400 mL / NET: 200 mL    General: frail female sitting up in chair, appears older than stated age, no apparent distress  HEENT: moderate alopecia; conjunctival pallor; nasal cannula in place; hypophonic speech, moist mucous membranes  Neck: supple  CVS: S1/S2, RRR  Resp: on nasal cannula, speaks short sentences but not in distress or using accessory muscles,   GI: soft, non-tender  : +joseph draining clear yellow urine  Musc: hypotrophic muscularity and bulk, particularly of UE/hands, LE; profound kyphoscoliosis muscle wasting of left trapezius and latissimus dorsi  Neuro: awake and alert  Psych: flat, depressed affect; pleasant  Skin: intact  Lymph: no LAD  Preadmit Karnofsky:  50%           Current Karnofsky:     40%  Cachexia (Y/N): Y  BMI: 24.3    Advanced Directives:     Full Code    Decision maker: Esha Avila [phone]  Legal surrogate:     GOALS OF CARE DISCUSSION       Palliative care info/counseling provided	             Advanced Directives addressed please see Advance Care Planning Note	                 Documentation of GOC: FULL CODE and continued treatment if possible          REFERRALS	        Palliative Med        Unit SW/Case Mgmt        -- declined       Speech/Swallow -- passed dysphagia screen       Patient/Family Support       Massage Therapy       Music Therapy       Hospice -- deferred today       Nutrition -- dietician evaluated       Ethics       PT/OT

## 2019-05-03 NOTE — PROGRESS NOTE ADULT - ASSESSMENT
Assessment and Plan: 59 F w/ Stage IV Endometrial Carcinoma s/p staging surgery 7/2018 and 1 cycle of chemo in 2/2019, DM, low anterior resection mobilization of splenic flexure, end colostomy w/ rectovaginal fistula w/ numerous admissions for urinary tract infection and bacteremia w/ acute hypoxic respiratory failure and AMS in the setting of urosepsis; now s/p extubation on 5/2.    Neurology:  #Metabolic encephalopathy  RESOLVED  Pt w/ acute change in mental status likely in the setting of sepsis and acidosis.     Pulmonary:  #Acute hypoxic respiratory failure  Resolved, extubated on 5/2  - Treat underlying sepsis as below    Cardiology:  #Septic shock 2/2 urosepsis  Resolved   Initially requiring levophed, weaned off on 5/1 am  - blood and urine cultures from 4/30 growing ESBL Ecoli  sensitive to meropenem  - ID following  - continue meropenem 1gm q8hrs  - f/u surveillance blood cx 5/1  - will give abx through chemoport    #volume status  - high output state 2/2 to sepsis. warm, well perfused, mentating appropriately w good urine output  - currently euvolemic    GI:  #Ostomy  Pt w/ hx of enterovaginal fistula w/ ostomy bag in place  - Continue to monitor ostomy output  - continue bowel regimen      :  #sepsis 2/2 to UTI  - Continue management of sepsis as above    #Indwelling joseph  Pt w/ hx of indwelling joseph catheter. Was recently replaced within the past week   - C/w joseph    #Endometrial CA  Pt follows w/ GYN. F/u GYN recs  - no planned chemo or intervention at this time    Renal:  #non anion gap metabolic acidosis  - improved  - likely 2/2 to normal saline administration    MSK:  No active issues    Endocrine:  #DM  - 4.8 A1c  - FS/MISS     ID:  #Septic shock 2/2 UTI  plan as above for sepsis - continue meropenem for ESBL e coli bactermia    FEN: NPO w tube feeds - dysphagia screen now that extubated  Replete lytes PRN K>4, Mg>2    PPx: Lovenox, SCDs    Code: FULL CODE    Dispo: Patient requires continued monitoring in MICU  Critical care time rendered 50 minutes Assessment and Plan: 59 F w/ Stage IV Endometrial Carcinoma s/p staging surgery 7/2018 and 1 cycle of chemo in 2/2019, DM, low anterior resection mobilization of splenic flexure, end colostomy w/ rectovaginal fistula w/ numerous admissions for urinary tract infection and bacteremia w/ acute hypoxic respiratory failure and AMS in the setting of urosepsis; now s/p extubation on 5/2.    Neurology:  #Metabolic encephalopathy  RESOLVED  Pt w/ acute change in mental status likely in the setting of sepsis and acidosis.     Pulmonary:  #Acute hypoxic respiratory failure  Resolved, extubated on 5/2  - Treat underlying sepsis as below    Cardiology:  #Septic shock 2/2 urosepsis  Resolved   Initially requiring levophed, weaned off on 5/1 am  - blood and urine cultures from 4/30 growing ESBL Ecoli  sensitive to meropenem  - ID following  - continue meropenem 1gm q8hrs  - surveillance blood cx 5/1 - NGTD  - continue abx through chemoport    #volume status  - high output state 2/2 to sepsis. warm, well perfused, mentating appropriately w good urine output  - currently euvolemic    GI:  #Ostomy  Pt w/ hx of enterovaginal fistula w/ ostomy bag in place  - Continue to monitor ostomy output  - continue bowel regimen      :  #sepsis 2/2 to UTI  - Continue management of sepsis as above    #Indwelling joseph  Pt w/ hx of indwelling joseph catheter. Was recently replaced within the past week   - C/w joseph    #Endometrial CA  Pt follows w/ GYN. F/u GYN recs  - patient to be transferred back to GYN service  - started tamoxifen and metformin    Renal:   No active issues    MSK:  No active issues    Endocrine:  #DM  - 4.8 A1c  - FS/MISS     ID:  #Septic shock 2/2 UTI  plan as above for sepsis - continue meropenem for ESBL e coli bactermia    FEN: NPO w tube feeds - dysphagia screen now that extubated  Replete lytes PRN K>4, Mg>2    PPx: Lovenox, SCDs    Code: FULL CODE    Dispo: Patient stable to be transferred to GYN service

## 2019-05-03 NOTE — PROGRESS NOTE ADULT - SUBJECTIVE AND OBJECTIVE BOX
GYN Progress Note    Patient seen at bedside.  she is feeling better.   sitting in a chair.   significant clinical improvement from admission.  on the meropenem, afebrile. ID's note says rec ertapenem.  repleted mg today.  on soft diet.  Urology consulted today.     Denies CP, palpitations, SOB, fever, chills, nausea, vomiting.    Vital Signs Last 24 Hrs  T(C): 36.3 (03 May 2019 14:32), Max: 37.1 (03 May 2019 06:58)  T(F): 97.4 (03 May 2019 14:32), Max: 98.8 (03 May 2019 06:58)  HR: 86 (03 May 2019 15:00) (74 - 92)  BP: 115/69 (03 May 2019 15:00) (68/45 - 126/45)  BP(mean): 86 (03 May 2019 15:00) (56 - 88)  RR: 28 (03 May 2019 15:00) (14 - 34)  SpO2: 100% (03 May 2019 15:00) (94% - 100%)    Physical Exam:  Gen: No Acute Distress  Pulm: no respiratory distress  Ext: SCDs in place, [facility]    I&O's Summary    02 May 2019 07:01  -  03 May 2019 07:00  --------------------------------------------------------  IN: 862 mL / OUT: 1370 mL / NET: -508 mL    03 May 2019 07:01  -  03 May 2019 16:02  --------------------------------------------------------  IN: 650 mL / OUT: 585 mL / NET: 65 mL      MEDICATIONS  (STANDING):  dextrose 5%. 1000 milliLiter(s) (50 mL/Hr) IV Continuous <Continuous>  dextrose 50% Injectable 12.5 Gram(s) IV Push once  dextrose 50% Injectable 25 Gram(s) IV Push once  dextrose 50% Injectable 25 Gram(s) IV Push once  enoxaparin Injectable 40 milliGRAM(s) SubCutaneous every 24 hours  insulin lispro (HumaLOG) corrective regimen sliding scale   SubCutaneous Before meals and at bedtime  meropenem  IVPB 1000 milliGRAM(s) IV Intermittent every 8 hours  metFORMIN 1000 milliGRAM(s) Oral two times a day  pantoprazole   Suspension 40 milliGRAM(s) Oral daily  petrolatum Ophthalmic Ointment 1 Application(s) Both EYES three times a day  tamoxifen 20 milliGRAM(s) Oral two times a day  zinc oxide 20% Ointment 1 Application(s) Topical three times a day    MEDICATIONS  (PRN):  dextrose 40% Gel 15 Gram(s) Oral once PRN Blood Glucose LESS THAN 70 milliGRAM(s)/deciliter  glucagon  Injectable 1 milliGRAM(s) IntraMuscular once PRN Glucose LESS THAN 70 milligrams/deciliter  morphine  - Injectable 2 milliGRAM(s) IV Push every 4 hours PRN Severe Pain (7 - 10)  ondansetron Injectable 8 milliGRAM(s) IV Push every 8 hours PRN Nausea and/or Vomiting      LABS:                        7.7    6.44  )-----------( 193      ( 03 May 2019 06:49 )             25.6     05-03    143  |  111<H>  |  17  ----------------------------<  123<H>  4.9   |  24  |  0.48<L>    Ca    9.1      03 May 2019 06:49  Phos  3.7     05-03  Mg     1.6     05-03

## 2019-05-03 NOTE — CONSULT NOTE ADULT - PROBLEM SELECTOR RECOMMENDATION 3
stage IV endometrial adenocarcinoma; per GYN-ONC w/ interval increase in tumor burden  - management per GYN-ONC  - not currently on chemotherapy stage IV endometrial adenocarcinoma; per GYN-ONC w/ interval increase in tumor burden  - management per GYN-ONC  - not currently on chemotherapy secondary to performance status and active infection  -patient states that her "cancer is gone, that they removed it all at [facility]"  may have poor understanding of her situation

## 2019-05-03 NOTE — CONSULT NOTE ADULT - PROBLEM SELECTOR RECOMMENDATION 6
Significant debility and weakness that is exacerbated by post-polio syndrome; had been in subacute rehab facility for LE weakness and with gait/strength training regimen via PT; now further complicated by recent intubation and septic shock  - continue PT  - out of bed to chair  - nutritional support Significant debility and weakness that is exacerbated by post-polio syndrome; had been in subacute rehab facility for LE weakness and with gait/strength training regimen via PT; now further complicated by recent intubation and septic shock  - continue PT  - out of bed to chair  - nutritional support  -patient distressed by her increasing dependence, not sure she will be well enough to return home

## 2019-05-03 NOTE — CONSULT NOTE ADULT - ASSESSMENT
54 year old female with known stage IV endometrial cancer, likely with progression of disease, complex fistulae, chronic indwelling Westbrook admitted with fever and urosepsis

## 2019-05-03 NOTE — PROGRESS NOTE ADULT - ASSESSMENT
60yo F HD5 with stage IV endometrial adenocarcinoma s/p staging surgery '18 and 1 round of chemotherapy 2/19 readmitted from [facility] with fever, previously admitted for management of symptomatic rectovaginal and enterovaginal fistulas. Presents on referral from [facility] with urosepsis.  Rapid response called due to worsening respiratory status and mental status, pt intubated and transferred to MICU.    1. Neuro: extubated.    CIDP s/p IVIG, next due on 5/9, also post polio contributing to weakness. Will need neuro to consult again (Dr. Sellers) regarding patient's strength. Per the MICU team who spoke with him, pt may need immunosuppression for this but cannot do that given her clinical picture.  2. Pulm: 2L NC as needed  3. Cardio: HD stable  4. FEN/GI: s/p dobhoff, now on soft diet, off IVH  5. : joseph in place. suspect urosepsis. consult urology. consider suprapubic catheter.  6. ID: meropenem, s/p vanc  -E. Coli bacteremia, suspect  source  =Recommend:  1.  Dc meropenem, Etest showing sensitivity to Ertapenem.  2.  Recommend start Ertapenem 1g daily, to be continued through May 14th (14 days total from last negative blood culture.   3.  Please obtain weekly CBC and CMP and will be called to make a f/u appt w/ Dr. Lawler  -ID team signing off  7. Endocrine: FS, ISS, metformin for improved mortality  8. VTE prophylaxis - SCDs, Lovenox 40 daily   9. GYN malignancy  -stopped anastrazole and initiated 3 weeks of megace 80mg BID followed by 3 weeks of tamoxifen. started megace and metformin 4/10. due to start tamoxifen 5/1 but delayed until pt taking po. started Tamoxifen 20mg BID x 21 days 5/3.   10. Derm: monitor sacrum for s/s of pressure ulcer. encourage frequent changes in position and OOB during  the day  11. social work/dispo planning as patient will go back to [facility].     *Urology consulted today

## 2019-05-04 LAB
ANION GAP SERPL CALC-SCNC: 7 MMOL/L — SIGNIFICANT CHANGE UP (ref 5–17)
BASOPHILS # BLD AUTO: 0.01 K/UL — SIGNIFICANT CHANGE UP (ref 0–0.2)
BASOPHILS NFR BLD AUTO: 0.1 % — SIGNIFICANT CHANGE UP (ref 0–2)
BUN SERPL-MCNC: 14 MG/DL — SIGNIFICANT CHANGE UP (ref 7–23)
CALCIUM SERPL-MCNC: 9.6 MG/DL — SIGNIFICANT CHANGE UP (ref 8.4–10.5)
CHLORIDE SERPL-SCNC: 105 MMOL/L — SIGNIFICANT CHANGE UP (ref 96–108)
CO2 SERPL-SCNC: 28 MMOL/L — SIGNIFICANT CHANGE UP (ref 22–31)
CREAT SERPL-MCNC: 0.52 MG/DL — SIGNIFICANT CHANGE UP (ref 0.5–1.3)
EOSINOPHIL # BLD AUTO: 0.2 K/UL — SIGNIFICANT CHANGE UP (ref 0–0.5)
EOSINOPHIL NFR BLD AUTO: 2.7 % — SIGNIFICANT CHANGE UP (ref 0–6)
GLUCOSE BLDC GLUCOMTR-MCNC: 113 MG/DL — HIGH (ref 70–99)
GLUCOSE BLDC GLUCOMTR-MCNC: 135 MG/DL — HIGH (ref 70–99)
GLUCOSE BLDC GLUCOMTR-MCNC: 143 MG/DL — HIGH (ref 70–99)
GLUCOSE BLDC GLUCOMTR-MCNC: 162 MG/DL — HIGH (ref 70–99)
GLUCOSE SERPL-MCNC: 129 MG/DL — HIGH (ref 70–99)
HCT VFR BLD CALC: 26.8 % — LOW (ref 34.5–45)
HGB BLD-MCNC: 7.9 G/DL — LOW (ref 11.5–15.5)
IMM GRANULOCYTES NFR BLD AUTO: 0.8 % — SIGNIFICANT CHANGE UP (ref 0–1.5)
LYMPHOCYTES # BLD AUTO: 1.36 K/UL — SIGNIFICANT CHANGE UP (ref 1–3.3)
LYMPHOCYTES # BLD AUTO: 18.6 % — SIGNIFICANT CHANGE UP (ref 13–44)
MAGNESIUM SERPL-MCNC: 1.5 MG/DL — LOW (ref 1.6–2.6)
MCHC RBC-ENTMCNC: 29.5 GM/DL — LOW (ref 32–36)
MCHC RBC-ENTMCNC: 30.3 PG — SIGNIFICANT CHANGE UP (ref 27–34)
MCV RBC AUTO: 102.7 FL — HIGH (ref 80–100)
MONOCYTES # BLD AUTO: 0.8 K/UL — SIGNIFICANT CHANGE UP (ref 0–0.9)
MONOCYTES NFR BLD AUTO: 10.9 % — SIGNIFICANT CHANGE UP (ref 2–14)
NEUTROPHILS # BLD AUTO: 4.88 K/UL — SIGNIFICANT CHANGE UP (ref 1.8–7.4)
NEUTROPHILS NFR BLD AUTO: 66.9 % — SIGNIFICANT CHANGE UP (ref 43–77)
NRBC # BLD: 0 /100 WBCS — SIGNIFICANT CHANGE UP (ref 0–0)
PHOSPHATE SERPL-MCNC: 3.5 MG/DL — SIGNIFICANT CHANGE UP (ref 2.5–4.5)
PLATELET # BLD AUTO: 195 K/UL — SIGNIFICANT CHANGE UP (ref 150–400)
POTASSIUM SERPL-MCNC: 5.2 MMOL/L — SIGNIFICANT CHANGE UP (ref 3.5–5.3)
POTASSIUM SERPL-SCNC: 5.2 MMOL/L — SIGNIFICANT CHANGE UP (ref 3.5–5.3)
RBC # BLD: 2.61 M/UL — LOW (ref 3.8–5.2)
RBC # FLD: 15.3 % — HIGH (ref 10.3–14.5)
SODIUM SERPL-SCNC: 140 MMOL/L — SIGNIFICANT CHANGE UP (ref 135–145)
WBC # BLD: 7.31 K/UL — SIGNIFICANT CHANGE UP (ref 3.8–10.5)
WBC # FLD AUTO: 7.31 K/UL — SIGNIFICANT CHANGE UP (ref 3.8–10.5)

## 2019-05-04 PROCEDURE — 99233 SBSQ HOSP IP/OBS HIGH 50: CPT

## 2019-05-04 PROCEDURE — 71045 X-RAY EXAM CHEST 1 VIEW: CPT | Mod: 26

## 2019-05-04 RX ORDER — ACETAMINOPHEN 500 MG
650 TABLET ORAL EVERY 6 HOURS
Qty: 0 | Refills: 0 | Status: DISCONTINUED | OUTPATIENT
Start: 2019-05-04 | End: 2019-05-13

## 2019-05-04 RX ORDER — ACETAMINOPHEN 500 MG
1000 TABLET ORAL ONCE
Qty: 0 | Refills: 0 | Status: COMPLETED | OUTPATIENT
Start: 2019-05-04 | End: 2019-05-04

## 2019-05-04 RX ORDER — MAGNESIUM SULFATE 500 MG/ML
2 VIAL (ML) INJECTION ONCE
Qty: 0 | Refills: 0 | Status: COMPLETED | OUTPATIENT
Start: 2019-05-04 | End: 2019-05-04

## 2019-05-04 RX ADMIN — Medication 2: at 12:24

## 2019-05-04 RX ADMIN — Medication 1000 MILLIGRAM(S): at 08:00

## 2019-05-04 RX ADMIN — METFORMIN HYDROCHLORIDE 1000 MILLIGRAM(S): 850 TABLET ORAL at 19:54

## 2019-05-04 RX ADMIN — ZINC OXIDE 1 APPLICATION(S): 200 OINTMENT TOPICAL at 15:20

## 2019-05-04 RX ADMIN — ZINC OXIDE 1 APPLICATION(S): 200 OINTMENT TOPICAL at 22:19

## 2019-05-04 RX ADMIN — METFORMIN HYDROCHLORIDE 1000 MILLIGRAM(S): 850 TABLET ORAL at 07:10

## 2019-05-04 RX ADMIN — Medication 1 APPLICATION(S): at 15:18

## 2019-05-04 RX ADMIN — Medication 50 GRAM(S): at 15:20

## 2019-05-04 RX ADMIN — TAMOXIFEN CITRATE 20 MILLIGRAM(S): 20 TABLET, FILM COATED ORAL at 06:45

## 2019-05-04 RX ADMIN — ERTAPENEM SODIUM 120 MILLIGRAM(S): 1 INJECTION, POWDER, LYOPHILIZED, FOR SOLUTION INTRAMUSCULAR; INTRAVENOUS at 22:19

## 2019-05-04 RX ADMIN — ZINC OXIDE 1 APPLICATION(S): 200 OINTMENT TOPICAL at 06:01

## 2019-05-04 RX ADMIN — MORPHINE SULFATE 2 MILLIGRAM(S): 50 CAPSULE, EXTENDED RELEASE ORAL at 06:05

## 2019-05-04 RX ADMIN — Medication 1 APPLICATION(S): at 06:01

## 2019-05-04 RX ADMIN — Medication 400 MILLIGRAM(S): at 07:15

## 2019-05-04 RX ADMIN — PANTOPRAZOLE SODIUM 40 MILLIGRAM(S): 20 TABLET, DELAYED RELEASE ORAL at 15:19

## 2019-05-04 RX ADMIN — TAMOXIFEN CITRATE 20 MILLIGRAM(S): 20 TABLET, FILM COATED ORAL at 19:54

## 2019-05-04 RX ADMIN — ENOXAPARIN SODIUM 40 MILLIGRAM(S): 100 INJECTION SUBCUTANEOUS at 00:00

## 2019-05-04 RX ADMIN — MORPHINE SULFATE 2 MILLIGRAM(S): 50 CAPSULE, EXTENDED RELEASE ORAL at 15:48

## 2019-05-04 RX ADMIN — MORPHINE SULFATE 2 MILLIGRAM(S): 50 CAPSULE, EXTENDED RELEASE ORAL at 07:05

## 2019-05-04 RX ADMIN — Medication 1 APPLICATION(S): at 22:19

## 2019-05-04 RX ADMIN — MORPHINE SULFATE 2 MILLIGRAM(S): 50 CAPSULE, EXTENDED RELEASE ORAL at 15:19

## 2019-05-04 RX ADMIN — ENOXAPARIN SODIUM 40 MILLIGRAM(S): 100 INJECTION SUBCUTANEOUS at 23:54

## 2019-05-04 NOTE — PROGRESS NOTE ADULT - ASSESSMENT
58yo F HD5 with stage IV endometrial adenocarcinoma s/p staging surgery '18 and 1 round of chemotherapy 2/19 readmitted from [facility] with fever, previously admitted for management of symptomatic rectovaginal and enterovaginal fistulas. Presents on referral from [facility] with urosepsis.  Septic shock resolved. Pt successfully extubated and now stepped down to regional floor.     1. Neuro: extubated. CIDP s/p IVIG, next due on 5/9, also possible polio contributing to weakness. Will need neuro to consult again (Dr. Sellers) regarding patient's strength.   2. Pulm: 2L NC as needed  3. Cardio: HD stable  4. FEN/GI: s/p dobhoff, now on soft diet, off IVH  5. : joseph in place. suspect urosepsis. urology consulted. consider suprapubic catheter. Continue antibiotics per ID recommendations.   6. ID: now switched to ertapenem 1g qd, will continue until 5/14, s/p vanc; will remove left peripheral IV and right hand IV as it appears erythematous and is tender  -E. Coli bacteremia, suspect  source given urine culture  7. Endocrine: FS, ISS, metformin for improved mortality  8. VTE prophylaxis - SCDs, Lovenox 40 daily   9. GYN malignancy  -stopped anastrazole and initiated 3 weeks of megace 80mg BID followed by 3 weeks of tamoxifen. started megace and metformin 4/10. Started Tamoxifen 20mg BID x 21 days 5/3.   10. Derm: monitor sacrum for s/s of pressure ulcer. encourage frequent changes in position and OOB during  the day  11. social work/dispo planning as patient will go back to [facility].   12. PT consult - appreciate PT evaluation and recommendations.

## 2019-05-04 NOTE — PHYSICAL THERAPY INITIAL EVALUATION ADULT - CRITERIA FOR SKILLED THERAPEUTIC INTERVENTIONS
predicted duration of therapy intervention/anticipated discharge recommendation/functional limitations in following categories/risk reduction/prevention/rehab potential/anticipated equipment needs at discharge/impairments found/therapy frequency

## 2019-05-04 NOTE — PHYSICAL THERAPY INITIAL EVALUATION ADULT - ADDITIONAL COMMENTS
Pt is from [facility]. She states she has been mostly and hospital and rehab since her surgery in July 2018. She states previous to July 2018 surgery she is independent in ambulation and ADLs. She states she walks with a RW in [facility] with 2-person assist. Pt is from [facility]. She states she has been mostly and hospital and rehab since her surgery in July 2018. She states previous to July 2018 surgery she is independent in ambulation and ADLs. She states she walks with a RW in [facility] with 2-person assist. She states she lives with her brother in a private house with no steps to enter.

## 2019-05-04 NOTE — PROGRESS NOTE ADULT - SUBJECTIVE AND OBJECTIVE BOX
GYN Progress Note    Patient seen at bedside. Patient stepped down to regional yesterday.   Pain controlled on current regimen.   Tolerating soft diet.   Has not been OOB.   Westbrook in place.     Denies CP, palpitations, SOB, fever, chills, nausea, vomiting.    Vital Signs Last 24 Hrs  T(C): 36.9 (04 May 2019 05:23), Max: 37.1 (03 May 2019 06:58)  T(F): 98.4 (04 May 2019 05:23), Max: 98.8 (03 May 2019 06:58)  HR: 88 (04 May 2019 05:23) (76 - 90)  BP: 101/66 (04 May 2019 05:23) (68/45 - 138/65)  BP(mean): 88 (03 May 2019 18:00) (56 - 99)  RR: 18 (04 May 2019 05:23) (16 - 34)  SpO2: 100% (04 May 2019 05:23) (100% - 100%)    Physical Exam:  Gen: No Acute Distress  Pulm: Normal work of breathing  GI: soft, appropriately tender, distended (stable), +BS, no rebound, no guarding.  Ext: SCDs in place, [facility]    I&O's Summary    02 May 2019 07:01  -  03 May 2019 07:00  --------------------------------------------------------  IN: 862 mL / OUT: 1370 mL / NET: -508 mL    03 May 2019 07:01  -  04 May 2019 06:38  --------------------------------------------------------  IN: 700 mL / OUT: 2510 mL / NET: -1810 mL      MEDICATIONS  (STANDING):  dextrose 5%. 1000 milliLiter(s) (50 mL/Hr) IV Continuous <Continuous>  dextrose 50% Injectable 12.5 Gram(s) IV Push once  dextrose 50% Injectable 25 Gram(s) IV Push once  dextrose 50% Injectable 25 Gram(s) IV Push once  enoxaparin Injectable 40 milliGRAM(s) SubCutaneous every 24 hours  ertapenem  IVPB 1000 milliGRAM(s) IV Intermittent every 24 hours  insulin lispro (HumaLOG) corrective regimen sliding scale   SubCutaneous Before meals and at bedtime  metFORMIN 1000 milliGRAM(s) Oral two times a day  pantoprazole   Suspension 40 milliGRAM(s) Oral daily  petrolatum Ophthalmic Ointment 1 Application(s) Both EYES three times a day  tamoxifen 20 milliGRAM(s) Oral two times a day  zinc oxide 20% Ointment 1 Application(s) Topical three times a day    MEDICATIONS  (PRN):  dextrose 40% Gel 15 Gram(s) Oral once PRN Blood Glucose LESS THAN 70 milliGRAM(s)/deciliter  glucagon  Injectable 1 milliGRAM(s) IntraMuscular once PRN Glucose LESS THAN 70 milligrams/deciliter  morphine  - Injectable 2 milliGRAM(s) IV Push every 4 hours PRN Severe Pain (7 - 10)  ondansetron Injectable 8 milliGRAM(s) IV Push every 8 hours PRN Nausea and/or Vomiting      LABS:                        7.7    6.44  )-----------( 193      ( 03 May 2019 06:49 )             25.6     05-03    143  |  111<H>  |  17  ----------------------------<  123<H>  4.9   |  24  |  0.48<L>    Ca    9.1      03 May 2019 06:49  Phos  3.7     05-03  Mg     1.6     05-03

## 2019-05-04 NOTE — PHYSICAL THERAPY INITIAL EVALUATION ADULT - PLANNED THERAPY INTERVENTIONS, PT EVAL
neuromuscular re-education/strengthening/balance training/bed mobility training/gait training/transfer training

## 2019-05-04 NOTE — PROGRESS NOTE ADULT - SUBJECTIVE AND OBJECTIVE BOX
GYN PROGRESS NOTE    Patient evaluated at the bedside. No acute events.  Denies CP/SOB/dizziness/nausea/vomiting/abdominal pain/calf pain.  Pain minimal. Patient is not ambulating independently. Westbrook remains in place.  Patient is tolerating a regular diet without issue. Patient reports that she feels much better this evening.     O:   T(C): 36.8 (05-04-19 @ 20:30), Max: 36.9 (05-04-19 @ 05:23)  HR: 93 (05-04-19 @ 20:30) (82 - 93)  BP: 104/72 (05-04-19 @ 20:30) (86/60 - 104/72)  RR: 16 (05-04-19 @ 20:30) (16 - 18)  SpO2: 96% (05-04-19 @ 20:30) (96% - 100%)  Wt(kg): --      Physical Exam:  Gen: No Acute Distress  Pulm: Normal work of breathing  GI: soft, nontender, not distended, +BS, no rebound, no guarding.  Ext: SCDs in place, wnl; left arm appears edematous, with respect to right arm             05-03 @ 07:01  -  05-04 @ 07:00  --------------------------------------------------------  IN: 800 mL / OUT: 2510 mL / NET: -1710 mL    05-04 @ 07:01  -  05-05 @ 00:40  --------------------------------------------------------  IN: 1260 mL / OUT: 1250 mL / NET: 10 mL

## 2019-05-04 NOTE — PROGRESS NOTE ADULT - ASSESSMENT
60yo F HD5 with stage IV endometrial adenocarcinoma s/p staging surgery '18 and 1 round of chemotherapy 2/19 readmitted from [facility] with fever, previously admitted for management of symptomatic rectovaginal and enterovaginal fistulas. Presents on referral from [facility] with urosepsis.  Septic shock resolved. Pt successfully extubated and now stepped down to regional floor.     1. Neuro: extubated.    CIDP s/p IVIG, next due on 5/9, also post polio contributing to weakness. Will need neuro to consult again (Dr. Sellers) regarding patient's strength.   2. Pulm: 2L NC as needed  3. Cardio: HD stable  4. FEN/GI: s/p dobhoff, now on soft diet, off IVH  5. : joseph in place. suspect urosepsis. urology consulted. consider suprapubic catheter.  6. ID: now switched to ertapenem 1g qd, will continue until 5/14, s/p vanc  -E. Coli bacteremia, suspect  source given urine culture  7. Endocrine: FS, ISS, metformin for improved mortality  8. VTE prophylaxis - SCDs, Lovenox 40 daily   9. GYN malignancy  -stopped anastrazole and initiated 3 weeks of megace 80mg BID followed by 3 weeks of tamoxifen. started megace and metformin 4/10. Started Tamoxifen 20mg BID x 21 days 5/3.   10. Derm: monitor sacrum for s/s of pressure ulcer. encourage frequent changes in position and OOB during  the day  11. social work/dispo planning as patient will go back to [facility].   12. PT consult

## 2019-05-04 NOTE — PHYSICAL THERAPY INITIAL EVALUATION ADULT - GENERAL OBSERVATIONS, REHAB EVAL
Pt rcvd supine in NAD, +O2 via NC at 2L, +[place]. She reports pain in her abdomen, unable to rate and itching  on her butt. RN Enoc informed. She is alert and oriented x 2. She states the date is April 30, 2019. She knows it is Saturday. She was seen today for PT IE.

## 2019-05-04 NOTE — PHYSICAL THERAPY INITIAL EVALUATION ADULT - PERTINENT HX OF CURRENT PROBLEM, REHAB EVAL
Per EMR, 58yo F HD5 with stage IV endometrial adenocarcinoma s/p staging surgery '18 and 1 round of chemotherapy 2/19 readmitted from [facility] with fever, previously admitted for management of symptomatic rectovaginal and enterovaginal fistulas. Presents on referral from [facility] with urosepsis.  Septic shock resolved. Pt successfully extubated and now stepped down to regional floor.

## 2019-05-05 LAB
ANION GAP SERPL CALC-SCNC: 7 MMOL/L — SIGNIFICANT CHANGE UP (ref 5–17)
BASOPHILS # BLD AUTO: 0.01 K/UL — SIGNIFICANT CHANGE UP (ref 0–0.2)
BASOPHILS NFR BLD AUTO: 0.1 % — SIGNIFICANT CHANGE UP (ref 0–2)
BUN SERPL-MCNC: 11 MG/DL — SIGNIFICANT CHANGE UP (ref 7–23)
CALCIUM SERPL-MCNC: 9.5 MG/DL — SIGNIFICANT CHANGE UP (ref 8.4–10.5)
CHLORIDE SERPL-SCNC: 102 MMOL/L — SIGNIFICANT CHANGE UP (ref 96–108)
CO2 SERPL-SCNC: 33 MMOL/L — HIGH (ref 22–31)
CREAT SERPL-MCNC: 0.47 MG/DL — LOW (ref 0.5–1.3)
CULTURE RESULTS: SIGNIFICANT CHANGE UP
CULTURE RESULTS: SIGNIFICANT CHANGE UP
EOSINOPHIL # BLD AUTO: 0.43 K/UL — SIGNIFICANT CHANGE UP (ref 0–0.5)
EOSINOPHIL NFR BLD AUTO: 5.9 % — SIGNIFICANT CHANGE UP (ref 0–6)
GLUCOSE BLDC GLUCOMTR-MCNC: 101 MG/DL — HIGH (ref 70–99)
GLUCOSE BLDC GLUCOMTR-MCNC: 110 MG/DL — HIGH (ref 70–99)
GLUCOSE BLDC GLUCOMTR-MCNC: 116 MG/DL — HIGH (ref 70–99)
GLUCOSE BLDC GLUCOMTR-MCNC: 181 MG/DL — HIGH (ref 70–99)
GLUCOSE SERPL-MCNC: 109 MG/DL — HIGH (ref 70–99)
HCT VFR BLD CALC: 27.5 % — LOW (ref 34.5–45)
HGB BLD-MCNC: 7.9 G/DL — LOW (ref 11.5–15.5)
IMM GRANULOCYTES NFR BLD AUTO: 0.4 % — SIGNIFICANT CHANGE UP (ref 0–1.5)
LYMPHOCYTES # BLD AUTO: 1.68 K/UL — SIGNIFICANT CHANGE UP (ref 1–3.3)
LYMPHOCYTES # BLD AUTO: 22.9 % — SIGNIFICANT CHANGE UP (ref 13–44)
MAGNESIUM SERPL-MCNC: 1.6 MG/DL — SIGNIFICANT CHANGE UP (ref 1.6–2.6)
MCHC RBC-ENTMCNC: 28.7 GM/DL — LOW (ref 32–36)
MCHC RBC-ENTMCNC: 29.4 PG — SIGNIFICANT CHANGE UP (ref 27–34)
MCV RBC AUTO: 102.2 FL — HIGH (ref 80–100)
MONOCYTES # BLD AUTO: 0.63 K/UL — SIGNIFICANT CHANGE UP (ref 0–0.9)
MONOCYTES NFR BLD AUTO: 8.6 % — SIGNIFICANT CHANGE UP (ref 2–14)
NEUTROPHILS # BLD AUTO: 4.56 K/UL — SIGNIFICANT CHANGE UP (ref 1.8–7.4)
NEUTROPHILS NFR BLD AUTO: 62.1 % — SIGNIFICANT CHANGE UP (ref 43–77)
NRBC # BLD: 0 /100 WBCS — SIGNIFICANT CHANGE UP (ref 0–0)
PHOSPHATE SERPL-MCNC: 3.7 MG/DL — SIGNIFICANT CHANGE UP (ref 2.5–4.5)
PLATELET # BLD AUTO: 232 K/UL — SIGNIFICANT CHANGE UP (ref 150–400)
POTASSIUM SERPL-MCNC: 5 MMOL/L — SIGNIFICANT CHANGE UP (ref 3.5–5.3)
POTASSIUM SERPL-SCNC: 5 MMOL/L — SIGNIFICANT CHANGE UP (ref 3.5–5.3)
RBC # BLD: 2.69 M/UL — LOW (ref 3.8–5.2)
RBC # FLD: 15.1 % — HIGH (ref 10.3–14.5)
SODIUM SERPL-SCNC: 142 MMOL/L — SIGNIFICANT CHANGE UP (ref 135–145)
SPECIMEN SOURCE: SIGNIFICANT CHANGE UP
SPECIMEN SOURCE: SIGNIFICANT CHANGE UP
WBC # BLD: 7.34 K/UL — SIGNIFICANT CHANGE UP (ref 3.8–10.5)
WBC # FLD AUTO: 7.34 K/UL — SIGNIFICANT CHANGE UP (ref 3.8–10.5)

## 2019-05-05 PROCEDURE — 71045 X-RAY EXAM CHEST 1 VIEW: CPT | Mod: 26

## 2019-05-05 RX ORDER — KETOROLAC TROMETHAMINE 30 MG/ML
15 SYRINGE (ML) INJECTION ONCE
Qty: 0 | Refills: 0 | Status: DISCONTINUED | OUTPATIENT
Start: 2019-05-05 | End: 2019-05-05

## 2019-05-05 RX ORDER — FUROSEMIDE 40 MG
20 TABLET ORAL ONCE
Qty: 0 | Refills: 0 | Status: COMPLETED | OUTPATIENT
Start: 2019-05-05 | End: 2019-05-05

## 2019-05-05 RX ADMIN — METFORMIN HYDROCHLORIDE 1000 MILLIGRAM(S): 850 TABLET ORAL at 20:10

## 2019-05-05 RX ADMIN — ERTAPENEM SODIUM 120 MILLIGRAM(S): 1 INJECTION, POWDER, LYOPHILIZED, FOR SOLUTION INTRAMUSCULAR; INTRAVENOUS at 22:20

## 2019-05-05 RX ADMIN — ZINC OXIDE 1 APPLICATION(S): 200 OINTMENT TOPICAL at 15:20

## 2019-05-05 RX ADMIN — ZINC OXIDE 1 APPLICATION(S): 200 OINTMENT TOPICAL at 22:23

## 2019-05-05 RX ADMIN — TAMOXIFEN CITRATE 20 MILLIGRAM(S): 20 TABLET, FILM COATED ORAL at 20:10

## 2019-05-05 RX ADMIN — ZINC OXIDE 1 APPLICATION(S): 200 OINTMENT TOPICAL at 06:21

## 2019-05-05 RX ADMIN — ENOXAPARIN SODIUM 40 MILLIGRAM(S): 100 INJECTION SUBCUTANEOUS at 22:21

## 2019-05-05 RX ADMIN — Medication 1 APPLICATION(S): at 06:20

## 2019-05-05 RX ADMIN — PANTOPRAZOLE SODIUM 40 MILLIGRAM(S): 20 TABLET, DELAYED RELEASE ORAL at 15:20

## 2019-05-05 RX ADMIN — Medication 15 MILLIGRAM(S): at 15:45

## 2019-05-05 RX ADMIN — Medication 1 APPLICATION(S): at 22:23

## 2019-05-05 RX ADMIN — METFORMIN HYDROCHLORIDE 1000 MILLIGRAM(S): 850 TABLET ORAL at 06:20

## 2019-05-05 RX ADMIN — TAMOXIFEN CITRATE 20 MILLIGRAM(S): 20 TABLET, FILM COATED ORAL at 06:21

## 2019-05-05 RX ADMIN — Medication 15 MILLIGRAM(S): at 15:19

## 2019-05-05 RX ADMIN — Medication 1 APPLICATION(S): at 15:20

## 2019-05-05 RX ADMIN — Medication 20 MILLIGRAM(S): at 22:20

## 2019-05-05 RX ADMIN — Medication 2: at 22:20

## 2019-05-05 NOTE — PROGRESS NOTE ADULT - SUBJECTIVE AND OBJECTIVE BOX
GYN PROGRESS NOTE    Patient evaluated at the bedside. No acute events. Patient reports some gas discomfort but otherwise has no complaints. Patient would like to continue sleeping at this time. Denies CP/SOB/dizziness/nausea/vomiting/calf pain. Joseph remains in place.     O:   T(C): 36.1 (05-05-19 @ 05:53), Max: 36.8 (05-04-19 @ 20:30)  HR: 83 (05-05-19 @ 05:53) (83 - 93)  BP: 91/62 (05-05-19 @ 05:53) (90/62 - 104/72)  RR: 17 (05-05-19 @ 05:53) (16 - 18)  SpO2: 100% (05-05-19 @ 05:53) (96% - 100%)  Wt(kg): --    GEN: patient appears well, sleeping comfortably   LUNGS: no respiratory distress  ABD: soft, not distended, stool and gas noted in ostomy appliance, ostomy is pink and warm  Pelvic: joseph draining clear urine   EXT: no calf tenderness, SCDs in place         05-04 @ 07:01  -  05-05 @ 07:00  --------------------------------------------------------  IN: 1260 mL / OUT: 2150 mL / NET: -890 mL

## 2019-05-05 NOTE — PROGRESS NOTE ADULT - ASSESSMENT
60yo F HD7 with stage IV endometrial adenocarcinoma s/p staging surgery '18 and 1 round of chemotherapy 2/19 readmitted from [facility] with fever, previously admitted for management of symptomatic rectovaginal and enterovaginal fistulas. Presents on referral from [facility] with urosepsis.  Septic shock resolved. Pt successfully extubated and now stepped down to regional floor.     1. Neuro: extubated. CIDP s/p IVIG, next due on 5/9, also possible polio contributing to weakness.   2. Pulm: 2L NC as needed  3. Cardio: HD stable; give 1x dose of lasix 20mg now for suspected fluid overload given slight pulm edema on CXR   4. FEN/GI: s/p dobhoff, now on soft diet, off IVH  5. : joseph in place. suspect urosepsis. urology consulted. consider suprapubic catheter. Continue antibiotics per ID recommendations.   6. ID: now switched to ertapenem 1g qd, will continue until 5/14, s/p vanc;  -E. Coli bacteremia, suspect  source given urine culture  7. Endocrine: FS, ISS, metformin for improved mortality  8. VTE prophylaxis - SCDs, Lovenox 40 daily   9. GYN malignancy  -stopped anastrazole and initiated 3 weeks of megace 80mg BID followed by 3 weeks of tamoxifen. started megace and metformin 4/10. Started Tamoxifen 20mg BID x 21 days 5/3.   10. Derm: monitor sacrum for s/s of pressure ulcer. encourage frequent changes in position and OOB during  the day  11. social work/dispo planning as patient will go back to [facility].   12. PT consult - appreciate PT evaluation and recommendations.

## 2019-05-05 NOTE — PROGRESS NOTE ADULT - SUBJECTIVE AND OBJECTIVE BOX
GYN Progress Note    Patient seen at bedside.  Reports having some breakthrough abdominal pain.  Tolerating soft diet.   OOB to chair this evening  Westbrook in place. Breathing heavier but denies SOB.    Denies CP, palpitations, fever, chills, nausea, vomiting.    Vital Signs Last 24 Hrs  T(C): 37.1 (05 May 2019 23:00), Max: 37.2 (05 May 2019 17:23)  T(F): 98.7 (05 May 2019 23:00), Max: 99 (05 May 2019 17:23)  HR: 100 (05 May 2019 23:00) (83 - 100)  BP: 91/66 (05 May 2019 23:00) (91/62 - 107/71)  BP(mean): --  RR: 17 (05 May 2019 23:00) (16 - 17)  SpO2: 88% (05 May 2019 23:00) (88% - 100%)    Physical Exam:  Gen: No Acute Distress  Pulm: slight increased work of breathing; mild coarse breath sounds bilaterally  GI: soft, appropriately tender, distended, +BS, no rebound, no guarding. Ostomy with solid output  Ext: SCDs in place, [facility]    I&O's Summary    04 May 2019 07:01  -  05 May 2019 07:00  --------------------------------------------------------  IN: 1260 mL / OUT: 2150 mL / NET: -890 mL    05 May 2019 07:01  -  06 May 2019 05:35  --------------------------------------------------------  IN: 0 mL / OUT: 1600 mL / NET: -1600 mL      MEDICATIONS  (STANDING):  dextrose 5%. 1000 milliLiter(s) (50 mL/Hr) IV Continuous <Continuous>  dextrose 50% Injectable 12.5 Gram(s) IV Push once  dextrose 50% Injectable 25 Gram(s) IV Push once  dextrose 50% Injectable 25 Gram(s) IV Push once  enoxaparin Injectable 40 milliGRAM(s) SubCutaneous every 24 hours  ertapenem  IVPB 1000 milliGRAM(s) IV Intermittent every 24 hours  insulin lispro (HumaLOG) corrective regimen sliding scale   SubCutaneous Before meals and at bedtime  metFORMIN 1000 milliGRAM(s) Oral two times a day  pantoprazole   Suspension 40 milliGRAM(s) Oral daily  petrolatum Ophthalmic Ointment 1 Application(s) Both EYES three times a day  tamoxifen 20 milliGRAM(s) Oral two times a day  zinc oxide 20% Ointment 1 Application(s) Topical three times a day    MEDICATIONS  (PRN):  acetaminophen   Tablet .. 650 milliGRAM(s) Oral every 6 hours PRN Temp greater or equal to 38C (100.4F), Mild Pain (1 - 3)  dextrose 40% Gel 15 Gram(s) Oral once PRN Blood Glucose LESS THAN 70 milliGRAM(s)/deciliter  glucagon  Injectable 1 milliGRAM(s) IntraMuscular once PRN Glucose LESS THAN 70 milligrams/deciliter  ondansetron Injectable 8 milliGRAM(s) IV Push every 8 hours PRN Nausea and/or Vomiting      LABS:                        7.9    7.34  )-----------( 232      ( 05 May 2019 06:42 )             27.5     05-05    142  |  102  |  11  ----------------------------<  109<H>  5.0   |  33<H>  |  0.47<L>    Ca    9.5      05 May 2019 06:42  Phos  3.7     05-05  Mg     1.6     05-05

## 2019-05-05 NOTE — PROVIDER CONTACT NOTE (OTHER) - RECOMMENDATIONS
MD Mejia at bedside for assessment; IV pain medication; will escalate to attending md for further recommendations.

## 2019-05-05 NOTE — PROGRESS NOTE ADULT - ASSESSMENT
58yo F HD6 with stage IV endometrial adenocarcinoma s/p staging surgery '18 and 1 round of chemotherapy 2/19 readmitted from [facility] with fever, previously admitted for management of symptomatic rectovaginal and enterovaginal fistulas. Presents on referral from [facility] with urosepsis.  Septic shock resolved. Pt successfully extubated and now stepped down to regional floor.     1. Neuro: extubated. CIDP s/p IVIG, next due on 5/9, also possible polio contributing to weakness. Will need neuro to consult again (Dr. Sellers) regarding patient's strength.   2. Pulm: 2L NC as needed  3. Cardio: HD stable  4. FEN/GI: s/p dobhoff, now on soft diet, off IVH  5. : joseph in place. suspect urosepsis. urology consulted. consider suprapubic catheter. Continue antibiotics per ID recommendations.   6. ID: now switched to ertapenem 1g qd, will continue until 5/14, s/p vanc; will remove left peripheral IV and right hand IV as it appears erythematous and is tender  -E. Coli bacteremia, suspect  source given urine culture  7. Endocrine: FS, ISS, metformin for improved mortality  8. VTE prophylaxis - SCDs, Lovenox 40 daily   9. GYN malignancy  -stopped anastrazole and initiated 3 weeks of megace 80mg BID followed by 3 weeks of tamoxifen. started megace and metformin 4/10. Started Tamoxifen 20mg BID x 21 days 5/3.   10. Derm: monitor sacrum for s/s of pressure ulcer. encourage frequent changes in position and OOB during  the day  11. social work/dispo planning as patient will go back to [facility].   12. PT consult - appreciate PT evaluation and recommendations. 58yo F HD7 with stage IV endometrial adenocarcinoma s/p staging surgery '18 and 1 round of chemotherapy 2/19 readmitted from [facility] with fever, previously admitted for management of symptomatic rectovaginal and enterovaginal fistulas. Presents on referral from [facility] with urosepsis.  Septic shock resolved. Pt successfully extubated and now stepped down to regional floor.     1. Neuro: extubated. CIDP s/p IVIG, next due on 5/9, also possible polio contributing to weakness.   2. Pulm: 2L NC as needed  3. Cardio: HD stable  4. FEN/GI: s/p dobhoff, now on soft diet, off IVH  5. : joseph in place. suspect urosepsis. urology consulted. consider suprapubic catheter. Continue antibiotics per ID recommendations.   6. ID: now switched to ertapenem 1g qd, will continue until 5/14, s/p vanc;  -E. Coli bacteremia, suspect  source given urine culture  7. Endocrine: FS, ISS, metformin for improved mortality  8. VTE prophylaxis - SCDs, Lovenox 40 daily   9. GYN malignancy  -stopped anastrazole and initiated 3 weeks of megace 80mg BID followed by 3 weeks of tamoxifen. started megace and metformin 4/10. Started Tamoxifen 20mg BID x 21 days 5/3.   10. Derm: monitor sacrum for s/s of pressure ulcer. encourage frequent changes in position and OOB during  the day  11. social work/dispo planning as patient will go back to [facility].   12. PT consult - appreciate PT evaluation and recommendations.

## 2019-05-06 DIAGNOSIS — C54.1 MALIGNANT NEOPLASM OF ENDOMETRIUM: ICD-10-CM

## 2019-05-06 DIAGNOSIS — I27.20 PULMONARY HYPERTENSION, UNSPECIFIED: ICD-10-CM

## 2019-05-06 DIAGNOSIS — R78.81 BACTEREMIA: ICD-10-CM

## 2019-05-06 DIAGNOSIS — N82.4 OTHER FEMALE INTESTINAL-GENITAL TRACT FISTULAE: ICD-10-CM

## 2019-05-06 DIAGNOSIS — J98.11 ATELECTASIS: ICD-10-CM

## 2019-05-06 DIAGNOSIS — E11.9 TYPE 2 DIABETES MELLITUS WITHOUT COMPLICATIONS: ICD-10-CM

## 2019-05-06 DIAGNOSIS — N12 TUBULO-INTERSTITIAL NEPHRITIS, NOT SPECIFIED AS ACUTE OR CHRONIC: ICD-10-CM

## 2019-05-06 DIAGNOSIS — J90 PLEURAL EFFUSION, NOT ELSEWHERE CLASSIFIED: ICD-10-CM

## 2019-05-06 DIAGNOSIS — A41.9 SEPSIS, UNSPECIFIED ORGANISM: ICD-10-CM

## 2019-05-06 DIAGNOSIS — N39.0 URINARY TRACT INFECTION, SITE NOT SPECIFIED: ICD-10-CM

## 2019-05-06 LAB
ANION GAP SERPL CALC-SCNC: 8 MMOL/L — SIGNIFICANT CHANGE UP (ref 5–17)
BASOPHILS # BLD AUTO: 0.02 K/UL — SIGNIFICANT CHANGE UP (ref 0–0.2)
BASOPHILS NFR BLD AUTO: 0.3 % — SIGNIFICANT CHANGE UP (ref 0–2)
BUN SERPL-MCNC: 14 MG/DL — SIGNIFICANT CHANGE UP (ref 7–23)
CALCIUM SERPL-MCNC: 9.7 MG/DL — SIGNIFICANT CHANGE UP (ref 8.4–10.5)
CHLORIDE SERPL-SCNC: 100 MMOL/L — SIGNIFICANT CHANGE UP (ref 96–108)
CO2 SERPL-SCNC: 35 MMOL/L — HIGH (ref 22–31)
CREAT SERPL-MCNC: 0.43 MG/DL — LOW (ref 0.5–1.3)
CULTURE RESULTS: SIGNIFICANT CHANGE UP
EOSINOPHIL # BLD AUTO: 0.35 K/UL — SIGNIFICANT CHANGE UP (ref 0–0.5)
EOSINOPHIL NFR BLD AUTO: 4.5 % — SIGNIFICANT CHANGE UP (ref 0–6)
GLUCOSE BLDC GLUCOMTR-MCNC: 111 MG/DL — HIGH (ref 70–99)
GLUCOSE BLDC GLUCOMTR-MCNC: 113 MG/DL — HIGH (ref 70–99)
GLUCOSE BLDC GLUCOMTR-MCNC: 140 MG/DL — HIGH (ref 70–99)
GLUCOSE BLDC GLUCOMTR-MCNC: 92 MG/DL — SIGNIFICANT CHANGE UP (ref 70–99)
GLUCOSE SERPL-MCNC: 92 MG/DL — SIGNIFICANT CHANGE UP (ref 70–99)
HCT VFR BLD CALC: 30.1 % — LOW (ref 34.5–45)
HGB BLD-MCNC: 8.6 G/DL — LOW (ref 11.5–15.5)
IMM GRANULOCYTES NFR BLD AUTO: 0.4 % — SIGNIFICANT CHANGE UP (ref 0–1.5)
LYMPHOCYTES # BLD AUTO: 1.89 K/UL — SIGNIFICANT CHANGE UP (ref 1–3.3)
LYMPHOCYTES # BLD AUTO: 24.5 % — SIGNIFICANT CHANGE UP (ref 13–44)
MAGNESIUM SERPL-MCNC: 1.3 MG/DL — LOW (ref 1.6–2.6)
MCHC RBC-ENTMCNC: 28.6 GM/DL — LOW (ref 32–36)
MCHC RBC-ENTMCNC: 29 PG — SIGNIFICANT CHANGE UP (ref 27–34)
MCV RBC AUTO: 101.3 FL — HIGH (ref 80–100)
MONOCYTES # BLD AUTO: 0.57 K/UL — SIGNIFICANT CHANGE UP (ref 0–0.9)
MONOCYTES NFR BLD AUTO: 7.4 % — SIGNIFICANT CHANGE UP (ref 2–14)
NEUTROPHILS # BLD AUTO: 4.86 K/UL — SIGNIFICANT CHANGE UP (ref 1.8–7.4)
NEUTROPHILS NFR BLD AUTO: 62.9 % — SIGNIFICANT CHANGE UP (ref 43–77)
NRBC # BLD: 0 /100 WBCS — SIGNIFICANT CHANGE UP (ref 0–0)
PHOSPHATE SERPL-MCNC: 3.5 MG/DL — SIGNIFICANT CHANGE UP (ref 2.5–4.5)
PLATELET # BLD AUTO: 196 K/UL — SIGNIFICANT CHANGE UP (ref 150–400)
POTASSIUM SERPL-MCNC: 4.7 MMOL/L — SIGNIFICANT CHANGE UP (ref 3.5–5.3)
POTASSIUM SERPL-SCNC: 4.7 MMOL/L — SIGNIFICANT CHANGE UP (ref 3.5–5.3)
RBC # BLD: 2.97 M/UL — LOW (ref 3.8–5.2)
RBC # FLD: 14.8 % — HIGH (ref 10.3–14.5)
SODIUM SERPL-SCNC: 143 MMOL/L — SIGNIFICANT CHANGE UP (ref 135–145)
SPECIMEN SOURCE: SIGNIFICANT CHANGE UP
WBC # BLD: 7.72 K/UL — SIGNIFICANT CHANGE UP (ref 3.8–10.5)
WBC # FLD AUTO: 7.72 K/UL — SIGNIFICANT CHANGE UP (ref 3.8–10.5)

## 2019-05-06 PROCEDURE — 99223 1ST HOSP IP/OBS HIGH 75: CPT | Mod: GC

## 2019-05-06 PROCEDURE — 99233 SBSQ HOSP IP/OBS HIGH 50: CPT | Mod: GC

## 2019-05-06 PROCEDURE — 99233 SBSQ HOSP IP/OBS HIGH 50: CPT

## 2019-05-06 RX ORDER — MAGNESIUM SULFATE 500 MG/ML
2 VIAL (ML) INJECTION ONCE
Qty: 0 | Refills: 0 | Status: COMPLETED | OUTPATIENT
Start: 2019-05-06 | End: 2019-05-06

## 2019-05-06 RX ADMIN — ZINC OXIDE 1 APPLICATION(S): 200 OINTMENT TOPICAL at 05:24

## 2019-05-06 RX ADMIN — ERTAPENEM SODIUM 120 MILLIGRAM(S): 1 INJECTION, POWDER, LYOPHILIZED, FOR SOLUTION INTRAMUSCULAR; INTRAVENOUS at 21:19

## 2019-05-06 RX ADMIN — TAMOXIFEN CITRATE 20 MILLIGRAM(S): 20 TABLET, FILM COATED ORAL at 06:33

## 2019-05-06 RX ADMIN — Medication 1 APPLICATION(S): at 05:24

## 2019-05-06 RX ADMIN — ENOXAPARIN SODIUM 40 MILLIGRAM(S): 100 INJECTION SUBCUTANEOUS at 21:19

## 2019-05-06 RX ADMIN — Medication 50 GRAM(S): at 07:03

## 2019-05-06 RX ADMIN — TAMOXIFEN CITRATE 20 MILLIGRAM(S): 20 TABLET, FILM COATED ORAL at 17:07

## 2019-05-06 RX ADMIN — METFORMIN HYDROCHLORIDE 1000 MILLIGRAM(S): 850 TABLET ORAL at 05:24

## 2019-05-06 RX ADMIN — METFORMIN HYDROCHLORIDE 1000 MILLIGRAM(S): 850 TABLET ORAL at 17:07

## 2019-05-06 RX ADMIN — PANTOPRAZOLE SODIUM 40 MILLIGRAM(S): 20 TABLET, DELAYED RELEASE ORAL at 11:11

## 2019-05-06 RX ADMIN — Medication 1 APPLICATION(S): at 13:13

## 2019-05-06 RX ADMIN — ZINC OXIDE 1 APPLICATION(S): 200 OINTMENT TOPICAL at 13:12

## 2019-05-06 NOTE — CONSULT NOTE ADULT - PROBLEM SELECTOR RECOMMENDATION 8
I would discuss the case with gynecology. My concern is the persistent fistula which is could be in the is for repeated infection. Patient had a reason CT scan of the abdomen and the findings were reviewed.

## 2019-05-06 NOTE — PROGRESS NOTE ADULT - ASSESSMENT
54 year old female with known stage IV endometrial cancer, likely with progression of disease, complex fistulae, chronic indwelling Westbrook admitted with fever and urosepsis - now s/p step-down from ICU to med-surg under gynecology service for further management.

## 2019-05-06 NOTE — CONSULT NOTE ADULT - PROBLEM SELECTOR PROBLEM 7
Goals of care, counseling/discussion
Type 2 diabetes mellitus without complication, without long-term current use of insulin

## 2019-05-06 NOTE — PROGRESS NOTE ADULT - PROBLEM SELECTOR PLAN 5
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

## 2019-05-06 NOTE — PROGRESS NOTE ADULT - PROBLEM SELECTOR PLAN 4
complicated by problem #2 above; requiring indwelling joseph catheter with multiple recent urologic infections w/ drug resistant organisms  - ABx therapy as above in problem #1  - appreciate urologic management/recommendations as above

## 2019-05-06 NOTE — CONSULT NOTE ADULT - SUBJECTIVE AND OBJECTIVE BOX
Patient is a 59y old  Female who presents with a chief complaint of sepsis (06 May 2019 15:18)      HPI:  58 yo G0 w/ known stage IV endometrial carcinoma s/p staging surgery 7/2018 and 1 cycle of chemotherapy in 2/2019 followed by multiple admissions for management of symptomatic enterovaginal fistula, complex fistula associated urinary tract infection, bacteremia presents on referral from [facility] after fever today of 100.7. Pt reports poor PO intake, nausea w/ dry heaving and bringing up phlegm for the past 2 days. She also reports continued weakness. She denies chills, HA, dizziness, CP, palpitations, SOB, abdominal pain, vaginal bleeding, leakage of stool per vagina, abnormal vaginal discharge. She states she was not ambulating daily at [facility] however was working with PT there.    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  Pleural effusion  Muscle weakness: generalized  Malignant neoplasm: endometrium  History of total abdominal hysterectomy and bilateral salpingo-oophorectomy: with resection of endometrial mass, low anterior resection and pelvic lymphadenectomy      FAMILY HISTORY:      SOCIAL HISTORY:  Smoking Status: [ ] Current, [ ] Former, [ ] Never  Pack Years:    MEDICATIONS:  Pulmonary:    Antimicrobials:  ertapenem  IVPB 1000 milliGRAM(s) IV Intermittent every 24 hours    Anticoagulants:  enoxaparin Injectable 40 milliGRAM(s) SubCutaneous every 24 hours    Onc:  tamoxifen 20 milliGRAM(s) Oral two times a day    GI/:  pantoprazole   Suspension 40 milliGRAM(s) Oral daily    Endocrine:  dextrose 50% Injectable 25 Gram(s) IV Push once  glucagon  Injectable 1 milliGRAM(s) IntraMuscular once PRN  insulin lispro (HumaLOG) corrective regimen sliding scale   SubCutaneous Before meals and at bedtime  metFORMIN 1000 milliGRAM(s) Oral two times a day    Cardiac:    Other Medications:  acetaminophen   Tablet .. 650 milliGRAM(s) Oral every 6 hours PRN  dextrose 5%. 1000 milliLiter(s) IV Continuous <Continuous>  ondansetron Injectable 8 milliGRAM(s) IV Push every 8 hours PRN  petrolatum Ophthalmic Ointment 1 Application(s) Both EYES three times a day  zinc oxide 20% Ointment 1 Application(s) Topical three times a day      Allergies    No Known Allergies    Intolerances        Vital Signs Last 24 Hrs  T(C): 37 (06 May 2019 21:00), Max: 37.2 (06 May 2019 17:19)  T(F): 98.6 (06 May 2019 21:00), Max: 99 (06 May 2019 17:19)  HR: 92 (06 May 2019 21:00) (80 - 104)  BP: 107/72 (06 May 2019 21:00) (91/66 - 109/74)  BP(mean): --  RR: 16 (06 May 2019 21:00) (16 - 18)  SpO2: 100% (06 May 2019 21:00) (88% - 100%)    05-05 @ 07:01  -  05-06 @ 07:00  --------------------------------------------------------  IN: 0 mL / OUT: 1900 mL / NET: -1900 mL    05-06 @ 07:01  -  05-06 @ 21:32  --------------------------------------------------------  IN: 480 mL / OUT: 350 mL / NET: 130 mL          LABS:      CBC Full  -  ( 06 May 2019 05:54 )  WBC Count : 7.72 K/uL  RBC Count : 2.97 M/uL  Hemoglobin : 8.6 g/dL  Hematocrit : 30.1 %  Platelet Count - Automated : 196 K/uL  Mean Cell Volume : 101.3 fl  Mean Cell Hemoglobin : 29.0 pg  Mean Cell Hemoglobin Concentration : 28.6 gm/dL  Auto Neutrophil # : 4.86 K/uL  Auto Lymphocyte # : 1.89 K/uL  Auto Monocyte # : 0.57 K/uL  Auto Eosinophil # : 0.35 K/uL  Auto Basophil # : 0.02 K/uL  Auto Neutrophil % : 62.9 %  Auto Lymphocyte % : 24.5 %  Auto Monocyte % : 7.4 %  Auto Eosinophil % : 4.5 %  Auto Basophil % : 0.3 %    05-06    143  |  100  |  14  ----------------------------<  92  4.7   |  35<H>  |  0.43<L>    Ca    9.7      06 May 2019 05:54  Phos  3.5     05-06  Mg     1.3     05-06      on< from: Xray Chest 1 View- PORTABLE-Urgent (05.05.19 @ 18:45) >    EXAM:  XR CHEST PORTABLE URGENT 1V                          PROCEDURE DATE:  05/05/2019          INTERPRETATION:  CLINICAL INFORMATION:  shortness of breath.    TECHNIQUE: A frontal view of the chest is dated 5/5/2019 6:45 PM.    COMPARISON: 5/4/2019    FINDINGS:    There is no significant change in bibasilar consolidation, left greater   than right vascular congestion. There is no thorax. The cardiomediastinal   silhouette is unchanged.    Power injectable port is unchanged with catheter tip terminating in the   superior vena cava.    IMPRESSION: No significant interval change.    < end of copied text >  < from: CT Chest w/ IV Cont (04.30.19 @ 01:01) >  EXAM:  CT CHEST IC                          EXAM:  CT ABDOMEN AND PELVIS IC                          PROCEDURE DATE:  04/30/2019          INTERPRETATION:  CLINICAL INDICATION: 89-year-old with abdominal pain,   nausea and vomiting.        FINDINGS:CT of the chest, abdomen and pelvis was performed with the   administration of intravenous contrast. Reconstructions were performed in   the sagittal and coronal planes. Comparison is made to prior studies   dated 4/5/2019 and 3/15/2019.    Evaluation of the chest demonstrates mild enlargement of the thyroid   gland. There is moderate cardiomegaly. No pleural or pericardial   effusion. Low lung volumes secondary to multisegmental bibasilar   atelectasis, unchanged since 4/5/2019. Pulmonary venousengorgement.   Enlargement of main pulmonary artery measuring 3.4 cm, consistent with   pulmonary arterial hypertension. Ascending thoracic aorta measures   maximally 3.6 cm the level of the main pulmonary artery. No gerardo central   pulmonary embolus. Negative for intraluminal thrombus within the left   atrial appendage.     Evaluation of the abdomen demonstrates that the bilateral adrenal glands   and pancreas are unremarkable. Gallbladder is collapsed and thick-walled.   There has been intervaldevelopment of hypoperfusion of the upper pole   and interpolar region of the left kidney which is enlarged with interval   development of surrounding perirenal edematous infiltration. There has   been interval development of circumferential mucosal enhancement of the   left ureter. No interval change in nonobstructing calculus within the   interpolar region of the left kidney measuring 7 mm. Mild periportal   edema. Evaluation of the gastrointestinal tract demonstrates postsurgical   changes of the anal canal with lower quadrant colostomy. No bowel   thickening. No bowel obstruction. Evaluation of the pelvis demonstrates   no interval change in severe circumferential mural thickening of the   bladder with significant perivesicular edematous infiltration and   interval development of mucosal enhancement. Westbrook catheter balloon   within the bladder. Hysterectomy. No free fluid. Intervally increasing   clustered prominent lymph nodes in the left retroperitoneum at the level   of the left kidney measuring up to 14 mm, previously measuring 11 mm. No   interval change in clustered soft tissue nodules in the presacral region   measuring up to 1.8 cm. Small volume of free pelvic fluid in the   presacral region. No significant vascular calcification. Opacified   aspects of the hepatic, portal, splenic, superior mesenteric vein,   bilateral renal veins, IVC and bilateral iliac veins demonstrates no   gerardo intraluminal thrombus. Evaluation of the osseous structures   demonstrates degenerative change of the lower lumbar spine with no   destructive osseous lesion.        IMPRESSION:    Interval development of left pyelonephritis and progressive   cystitis/ureteritis.    < end of copied text  < from: Echocardiogram (04.04.19 @ 09:02) >  EXAM:  ECHOCARDIOGRAM (CARDIOL)                          PROCEDURE DATE:  04/04/2019          INTERPRETATION:  Patient Height: 154.0 cm  Patient Weight: 61.0 kg  Heart Rate: 112 bpm  Systolic Pressure: 140 mmHg  Diastolic Pressure: 79 mmHg  BSA: 1.6 m^2  Interpretation Summary  Normal left ventricular size and wall thickness.The left ventricular wall   motion is normal.The left ventricular ejection fraction is 53%.The left   atrial   size is normal.The mitral inflow pattern is consistent with impaired left   ventricular relaxation with mildly elevated left atrial pressure   (8-14mmHg).   The right atrium is mildly dilated.The right ventricle is mildly   dilated.Structurally normal aortic valve.No aortic regurgitation   noted.Structurally normal mitral valve.No mitral regurgitation   noted.Structurally normal tricuspid valve.There is mild tricuspid   regurgitation.The pulmonary artery systolic pressure is estimated to be   50   mmHg.There is moderate pulmonary hypertension.The pulmonic valve is not   well   visualized.No aortic root dilatation.There is no pericardial effusion.  Left Ventricle  Normal left ventricular size and wall thickness.  The left ventricular wall motion is normal.    < end of copied text >                      RADIOLOGY & ADDITIONAL STUDIES (The following images were personally reviewed):

## 2019-05-06 NOTE — PROGRESS NOTE ADULT - PROBLEM SELECTOR PLAN 1
Originally presented w/ septic shock requiring pressor support and intubation -- now resolved w/ continued treatment for ESBL P. mirabilis in urine and blood  - continue antimicrobial therapy  - monitor vital signs.

## 2019-05-06 NOTE — CONSULT NOTE ADULT - PROBLEM SELECTOR RECOMMENDATION 6
etiology unknown at this point.  Venous doppler was negative.  On Lovenox,  It could be related to diastolic dysfunction.

## 2019-05-06 NOTE — PROGRESS NOTE ADULT - PROBLEM SELECTOR PLAN 6
Contracted polio when she was 5yrs old in [place]; does not recall much of the management but expresses regret this happened to her when the vaccine was just becoming available. Now with worsening ascending weakness that is complicating her overall functional status as she recovers from sepsis and contends with her endometrial cancer -- it is not entirely clear whether this is true post-polio syndrome or confounded by separate underlying neurologic pathology  - appreciate neurology input and evaluation  - mgmt otherwise as above in problem #5 for now Contracted polio when she was 5yrs old in [place]; does not recall much of the management but expresses regret this happened to her when the vaccine was just becoming available. Now with worsening ascending weakness that is complicating her overall functional status as she recovers from sepsis and contends with her endometrial cancer -- it is not entirely clear whether this is true post-polio syndrome or confounded by separate underlying neurologic pathology  - appreciate neurology input and evaluation.  GYN has asked Dr. Sellers to return for eval  - mgmt otherwise as above in problem #5 for now

## 2019-05-06 NOTE — PROGRESS NOTE ADULT - PROBLEM SELECTOR PLAN 3
rectovaginal and enterovaginal fistulas developed secondary to problem #3 and staging surgery in Fall 2018  - management per [facility]YN-[facility]  -  evaluated and discussed option of supra-pubic catheter that may decrease, but not eliminate her risk/propensity for recurrent urinary infections. Appreciate further  input on this matter. rectovaginal and enterovaginal fistulas developed secondary to problem #3 and staging surgery in Fall 2018  - management per GYN-[facility]  -  evaluated and discussed option of supra-pubic catheter that may decrease, but not eliminate her risk/propensity for recurrent urinary infections. No specific recommendation made yet  Appreciate further  input on this matter.

## 2019-05-06 NOTE — PROGRESS NOTE ADULT - PROBLEM SELECTOR PLAN 7
Continued providing counseling and support to patient today.  - Patient says her sister Esha arrives around 3:30-4pm [place] [employer] [place] - to try and discuss her goals of care with sister once she arrives.  - Case discussed w/ gyn attending as well Continued providing counseling and support to patient today.  - Patient says her sister Esha arrives around 3:30-4pm after work at the [employer] [place] - to try and discuss her goals of care with sister once she arrives.  - Case discussed w/ gyn attending as well  -Awaiting consult with Dr. Gutierrez for multiple medical problems

## 2019-05-06 NOTE — PROGRESS NOTE ADULT - SUBJECTIVE AND OBJECTIVE BOX
TIM MCDANIEL             [id number]    CC:  weakness, GOC    HPI:  60 yo G0 w/ known stage IV endometrial carcinoma s/p staging surgery 7/2018 and 1 cycle of chemotherapy in 2/2019 followed by multiple admissions for management of symptomatic enterovaginal fistula, complex fistula associated urinary tract infection, bacteremia presents on referral from [facility] after fever today of 100.7. Pt reports poor PO intake, nausea w/ dry heaving and bringing up phlegm for the past 2 days. She also reports continued weakness. She denies chills, HA, dizziness, CP, palpitations, SOB, abdominal pain, vaginal bleeding, leakage of stool per vagina, abnormal vaginal discharge. She states she was not ambulating daily at [facility] however was working with PT there.    OB/GYN Hx: G0, stage 4 endometrial cancer dx in 7/2018; h/o breast cancer in 2009 s/p chemo and radiation with left breast lumpectomy  PMHx: T2DM, polio in childhood, h/o breast cancer  SHx: left breast lumpectomy, right knee surgery with screw placement 2001, 7/2018 exploratory laparotomy, enterolysis, SHAMIR, BSO, pelvic lymphadenectomy, low anterior resection mobilization of splenic flexure, end colostomy   Allergies: NKDA (29 Apr 2019 23:11)    SUBJECTIVE:  She continues to suffer from feeling weak and fatigued. Does not think her upper extremity weakness is significant "it's not bad, my legs are bad though." Does not want to sit in chair because she said it is too uncomfortable and difficult for her with her weakness -- though admits discomfort no matter which position she is in. Does not feel she needs assistance to eat her lunch and that she can do it on her own.     ROS:  DYSPNEA: N  NAUS/VOM: N	  SECRETIONS: N	  AGITATION: N  Pain (Y/N):  N    -Provocation/Palliation:  -Quality/Quantity:  -Radiating:  -Severity:  -Timing/Frequency:  -Impact on ADLs:    OTHER REVIEW OF SYSTEMS:  UNABLE TO OBTAIN  due to:    PEx:  T(C): 37.1 (05-06-19 @ 14:30), Max: 37.2 (05-05-19 @ 17:23)  HR: 80 (05-06-19 @ 14:30) (80 - 104)  BP: 101/69 (05-06-19 @ 14:30) (91/66 - 109/74)  RR: 18 (05-06-19 @ 14:30) (16 - 18)  SpO2: 100% (05-06-19 @ 14:30) (88% - 100%)  Wt(kg): --    General: frail female resting in bed on her side, appears older than stated age, no apparent distress  HEENT: moderate alopecia; conjunctival pallor; nasal cannula in place; hypophonic speech, moist mucous membranes  Neck: supple  CVS: S1/S2, RRR  Resp: on nasal cannula, speaks short sentences but not in distress or using accessory muscles,   GI: soft, non-tender  : +joseph draining clear yellow urine  Musc: hypotrophic muscularity and bulk, particularly of UE/hands, LE; profound kyphoscoliosis muscle wasting of left trapezius and latissimus dorsi  Neuro: awake and alert  Psych: flat, depressed affect; pleasant  Skin: intact    ALLERGIES: No Known Allergies    OPIATE NAÏVE (Y/N): on admission yes; has been receiving opiates inpatiently    MEDICATIONS: REVIEWED  MEDICATIONS  (STANDING):  dextrose 5%. 1000 milliLiter(s) (50 mL/Hr) IV Continuous <Continuous>  dextrose 50% Injectable 25 Gram(s) IV Push once  enoxaparin Injectable 40 milliGRAM(s) SubCutaneous every 24 hours  ertapenem  IVPB 1000 milliGRAM(s) IV Intermittent every 24 hours  insulin lispro (HumaLOG) corrective regimen sliding scale   SubCutaneous Before meals and at bedtime  metFORMIN 1000 milliGRAM(s) Oral two times a day  pantoprazole   Suspension 40 milliGRAM(s) Oral daily  petrolatum Ophthalmic Ointment 1 Application(s) Both EYES three times a day  tamoxifen 20 milliGRAM(s) Oral two times a day  zinc oxide 20% Ointment 1 Application(s) Topical three times a day    MEDICATIONS  (PRN):  acetaminophen   Tablet .. 650 milliGRAM(s) Oral every 6 hours PRN Temp greater or equal to 38C (100.4F), Mild Pain (1 - 3)  glucagon  Injectable 1 milliGRAM(s) IntraMuscular once PRN Glucose LESS THAN 70 milligrams/deciliter  ondansetron Injectable 8 milliGRAM(s) IV Push every 8 hours PRN Nausea and/or Vomiting      LABS: REVIEWED  CBC:                        8.6    7.72  )-----------( 196      ( 06 May 2019 05:54 )             30.1     CMP:    05-06    143  |  100  |  14  ----------------------------<  92  4.7   |  35<H>  |  0.43<L>    Ca    9.7      06 May 2019 05:54  Phos  3.5     05-06  Mg     1.3     05-06    IMAGING: REVIEWED    ADVANCED DIRECTIVES:     FULL CODE    DECISION MAKER: Esha Avila [phone]  LEGAL SURROGATE:    PSYCHOSOCIAL-SPIRITUAL ASSESSMENT:       Reviewed       Care plan unchanged    GOALS OF CARE DISCUSSION       Palliative care info/counseling provided	           Family meeting          See previous Palliative Medicine Note       Documentation of GOC: remains FULL CODE  	      AGENCY CHOICE DISCUSSED:  NO      REFERRALS	        Palliative Med        Unit SW/Case Mgmt        -- declined       Speech/Swallow -- passed dysphagia screen       Patient/Family Support       Massage Therapy       Music Therapy       Hospice -- deferred today       Nutrition -- dietician evaluated       Ethics       PT/OT TIM MCDANIEL             [id number]    CC:  weakness, GOC    HPI:  58 yo G0 w/ known stage IV endometrial carcinoma s/p staging surgery 7/2018 and 1 cycle of chemotherapy in 2/2019 followed by multiple admissions for management of symptomatic enterovaginal fistula, complex fistula associated urinary tract infection, bacteremia presents on referral from [facility] after fever today of 100.7. Pt reports poor PO intake, nausea w/ dry heaving and bringing up phlegm for the past 2 days. She also reports continued weakness. She denies chills, HA, dizziness, CP, palpitations, SOB, abdominal pain, vaginal bleeding, leakage of stool per vagina, abnormal vaginal discharge. She states she was not ambulating daily at [facility] however was working with PT there.    OB/GYN Hx: G0, stage 4 endometrial cancer dx in 7/2018; h/o breast cancer in 2009 s/p chemo and radiation with left breast lumpectomy  PMHx: T2DM, polio in childhood, h/o breast cancer  SHx: left breast lumpectomy, right knee surgery with screw placement 2001, 7/2018 exploratory laparotomy, enterolysis, SHAMIR, BSO, pelvic lymphadenectomy, low anterior resection mobilization of splenic flexure, end colostomy   Allergies: NKDA (29 Apr 2019 23:11)    SUBJECTIVE:  She continues to suffer from feeling weak and fatigued. Does not think her upper extremity weakness is significant "it's not bad, my legs are bad though." Does not want to sit in chair because she said it is too uncomfortable and difficult for her with her weakness -- though admits discomfort no matter which position she is in. Does not feel she needs assistance to eat her lunch and that she can do it on her own. Nursing feels she needs assistance    ROS:  DYSPNEA: N  NAUS/VOM: N	  SECRETIONS: N	  AGITATION: N  Pain (Y/N):  N    -Provocation/Palliation:  -Quality/Quantity:  -Radiating:  -Severity:  -Timing/Frequency:  -Impact on ADLs:    OTHER REVIEW OF SYSTEMS: as above      PEx:  T(C): 37.1 (05-06-19 @ 14:30), Max: 37.2 (05-05-19 @ 17:23)  HR: 80 (05-06-19 @ 14:30) (80 - 104)  BP: 101/69 (05-06-19 @ 14:30) (91/66 - 109/74)  RR: 18 (05-06-19 @ 14:30) (16 - 18)  SpO2: 100% (05-06-19 @ 14:30) (88% - 100%)  Wt(kg): --    General: frail female resting in bed on her side, appears older than stated age, no apparent distress  HEENT: moderate alopecia; conjunctival pallor; nasal cannula in place; hypophonic speech, moist mucous membranes  Neck: supple  CVS: S1/S2, RRR  Resp: on nasal cannula, speaks short sentences but not in distress or using accessory muscles,   GI: soft, non-tender  : +joseph draining clear yellow urine  Musc: hypotrophic muscularity and bulk, particularly of UE/hands, LE; profound kyphoscoliosis muscle wasting of left trapezius and latissimus dorsi  Neuro: awake and alert  Psych: flat, depressed affect; pleasant  Skin: intact    ALLERGIES: No Known Allergies    OPIATE NAÏVE (Y/N): on admission yes; has been receiving opiates as inpatient    MEDICATIONS: REVIEWED  MEDICATIONS  (STANDING):  dextrose 5%. 1000 milliLiter(s) (50 mL/Hr) IV Continuous <Continuous>  dextrose 50% Injectable 25 Gram(s) IV Push once  enoxaparin Injectable 40 milliGRAM(s) SubCutaneous every 24 hours  ertapenem  IVPB 1000 milliGRAM(s) IV Intermittent every 24 hours  insulin lispro (HumaLOG) corrective regimen sliding scale   SubCutaneous Before meals and at bedtime  metFORMIN 1000 milliGRAM(s) Oral two times a day  pantoprazole   Suspension 40 milliGRAM(s) Oral daily  petrolatum Ophthalmic Ointment 1 Application(s) Both EYES three times a day  tamoxifen 20 milliGRAM(s) Oral two times a day  zinc oxide 20% Ointment 1 Application(s) Topical three times a day    MEDICATIONS  (PRN):  acetaminophen   Tablet .. 650 milliGRAM(s) Oral every 6 hours PRN Temp greater or equal to 38C (100.4F), Mild Pain (1 - 3)  glucagon  Injectable 1 milliGRAM(s) IntraMuscular once PRN Glucose LESS THAN 70 milligrams/deciliter  ondansetron Injectable 8 milliGRAM(s) IV Push every 8 hours PRN Nausea and/or Vomiting      LABS: REVIEWED  CBC:                        8.6    7.72  )-----------( 196      ( 06 May 2019 05:54 )             30.1     CMP:    05-06    143  |  100  |  14  ----------------------------<  92  4.7   |  35<H>  |  0.43<L>    Ca    9.7      06 May 2019 05:54  Phos  3.5     05-06  Mg     1.3     05-06    IMAGING: REVIEWED    ADVANCED DIRECTIVES:     FULL CODE    DECISION MAKER:  patient seems able to make her own decisions, but seeks input from her sister Esha Avila [phone]  LEGAL SURROGATE:    PSYCHOSOCIAL-SPIRITUAL ASSESSMENT:       Reviewed       Care plan unchanged    GOALS OF CARE DISCUSSION       Palliative care info/counseling provided	           Family meeting          See previous Palliative Medicine Note       Documentation of GOC: remains FULL CODE  	      AGENCY CHOICE DISCUSSED:  NO      REFERRALS	        Palliative Med        Unit SW/Case Mgmt        -- declined       Speech/Swallow -- passed dysphagia screen       Patient/Family Support       Massage Therapy       Music Therapy       Hospice -- deferred today       Nutrition -- dietician evaluated       Ethics       PT/OT

## 2019-05-06 NOTE — PROGRESS NOTE ADULT - SUBJECTIVE AND OBJECTIVE BOX
GYN Progress Note    Patient seen at bedside.  no events overnight.  received lasix.  no respiratory distress on 2L NC, removed at bedside.    Denies CP, palpitations, SOB, fever, chills, nausea, vomiting.    Vital Signs Last 24 Hrs  T(C): 36.7 (06 May 2019 05:38), Max: 37.2 (05 May 2019 17:23)  T(F): 98 (06 May 2019 05:38), Max: 99 (05 May 2019 17:23)  HR: 90 (06 May 2019 05:38) (85 - 100)  BP: 101/70 (06 May 2019 05:38) (91/66 - 107/71)  RR: 17 (06 May 2019 05:38) (16 - 17)  SpO2: 100% (06 May 2019 05:38) (88% - 100%)    Physical Exam:  Gen: No Acute Distress  Pulm: no respiratory distress  GI: soft, appropriately nontender, nondistended, ostomy w/ stool and gas  Ext: SCDs in place, wnl, strength 1/5 b/l in LE    I&O's Summary    04 May 2019 07:01  -  05 May 2019 07:00  --------------------------------------------------------  IN: 1260 mL / OUT: 2150 mL / NET: -890 mL    05 May 2019 07:01  -  06 May 2019 06:52  --------------------------------------------------------  IN: 0 mL / OUT: 1900 mL / NET: -1900 mL      MEDICATIONS  (STANDING):  dextrose 5%. 1000 milliLiter(s) (50 mL/Hr) IV Continuous <Continuous>  dextrose 50% Injectable 25 Gram(s) IV Push once  enoxaparin Injectable 40 milliGRAM(s) SubCutaneous every 24 hours  ertapenem  IVPB 1000 milliGRAM(s) IV Intermittent every 24 hours  insulin lispro (HumaLOG) corrective regimen sliding scale   SubCutaneous Before meals and at bedtime  magnesium sulfate  IVPB 2 Gram(s) IV Intermittent once  metFORMIN 1000 milliGRAM(s) Oral two times a day  pantoprazole   Suspension 40 milliGRAM(s) Oral daily  petrolatum Ophthalmic Ointment 1 Application(s) Both EYES three times a day  tamoxifen 20 milliGRAM(s) Oral two times a day  zinc oxide 20% Ointment 1 Application(s) Topical three times a day    MEDICATIONS  (PRN):  acetaminophen   Tablet .. 650 milliGRAM(s) Oral every 6 hours PRN Temp greater or equal to 38C (100.4F), Mild Pain (1 - 3)  glucagon  Injectable 1 milliGRAM(s) IntraMuscular once PRN Glucose LESS THAN 70 milligrams/deciliter  ondansetron Injectable 8 milliGRAM(s) IV Push every 8 hours PRN Nausea and/or Vomiting      LABS:                        8.6    7.72  )-----------( 196      ( 06 May 2019 05:54 )             30.1     05-06    143  |  100  |  14  ----------------------------<  92  4.7   |  35<H>  |  0.43<L>    Ca    9.7      06 May 2019 05:54  Phos  3.5     05-06  Mg     1.3     05-06

## 2019-05-06 NOTE — CONSULT NOTE ADULT - PROBLEM SELECTOR RECOMMENDATION 4
rectovaginal and enterovaginal fistulas developed secondary to problem #3 and staging surgery in Fall 2018  - management per GYN-ONC  - appreciate urology evaluation
small and no indication for thoracentesis

## 2019-05-06 NOTE — CHART NOTE - NSCHARTNOTEFT_GEN_A_CORE
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  Pleural effusion  Muscle weakness: generalized  Malignant neoplasm: endometrium  History of total abdominal hysterectomy and bilateral salpingo-oophorectomy: with resection of endometrial mass, low anterior resection and pelvic lymphadenectomy      Current Nutrition Order: CSTCHO w/ EnsureEnlive TID (1050kcal, 60g pro)    PO Intake: Good (%) [   ]  Fair (50-75%) [ x  ] Poor (<25%) [   ]  Consuming ~50% meals w/ 2 Ensures/day    GI Issues:   Denies N/V  Colostomy     Pain:  c/o discomfort at sacral spine (possibly where PU is)    Skin Integrity:  Stage 2 pressure ulcer at sacral spine   carmen score 13    Labs:   05-06    143  |  100  |  14  ----------------------------<  92  4.7   |  35<H>  |  0.43<L>    Ca    9.7      06 May 2019 05:54  Phos  3.5     05-06  Mg     1.3     05-06      CAPILLARY BLOOD GLUCOSE      POCT Blood Glucose.: 111 mg/dL (06 May 2019 11:24)  POCT Blood Glucose.: 92 mg/dL (06 May 2019 07:39)  POCT Blood Glucose.: 181 mg/dL (05 May 2019 21:34)  POCT Blood Glucose.: 116 mg/dL (05 May 2019 16:46)      Medications:  MEDICATIONS  (STANDING):  dextrose 5%. 1000 milliLiter(s) (50 mL/Hr) IV Continuous <Continuous>  dextrose 50% Injectable 25 Gram(s) IV Push once  enoxaparin Injectable 40 milliGRAM(s) SubCutaneous every 24 hours  ertapenem  IVPB 1000 milliGRAM(s) IV Intermittent every 24 hours  insulin lispro (HumaLOG) corrective regimen sliding scale   SubCutaneous Before meals and at bedtime  metFORMIN 1000 milliGRAM(s) Oral two times a day  pantoprazole   Suspension 40 milliGRAM(s) Oral daily  petrolatum Ophthalmic Ointment 1 Application(s) Both EYES three times a day  tamoxifen 20 milliGRAM(s) Oral two times a day  zinc oxide 20% Ointment 1 Application(s) Topical three times a day    MEDICATIONS  (PRN):  acetaminophen   Tablet .. 650 milliGRAM(s) Oral every 6 hours PRN Temp greater or equal to 38C (100.4F), Mild Pain (1 - 3)  glucagon  Injectable 1 milliGRAM(s) IntraMuscular once PRN Glucose LESS THAN 70 milligrams/deciliter  ondansetron Injectable 8 milliGRAM(s) IV Push every 8 hours PRN Nausea and/or Vomiting      Weight: 128.5  Height 61";IBW 105lbs; 122%IBW  Daily     Weight Change:   4.66% unintentional wt loss x1 mo.   Likely d/t difficulty meeting increased needs at rehab    Estimated energy needs:   Ideal body weight used for calculations as pt >120% of IBW.   Nutrient needs based on [facility] standards of care for maintenance in older adults.   needs adjusted for age, PU stage 2, catabolic illness, moderate wt loss over last month  1190-1428kcal/day (25-30kcal/kg)  67-76g pro/day (1.4-1.6g/kg)  1428-1666ml fluid/day (30-35ml/kg)    Subjective:   55yo F known to this RD from prior admission with known stage IV endometrial cancer, likely with progression of disease, complex fistulae, chronic indwelling Westbrook admitted with fever and urosepsis - now s/p step-down from ICU to med-surg under gynecology service for further management. Pt seen in room, resting in bed. Currently on a CSTCHO diet w/ EnsureEnlive TID (1050kcal, 60g pro). Endorsing fair appetite, consuming ~50% meals and having 2 Ensures/day. Asked pt if she would be able to aim for 3 Ensures/day to which she appeared very overwhelmed. Discussed either adequate intake from meals or aiming for 3 ensures/day. Pt agreeable and willing to try. Suspect pt's appetite never fully returned after being on longterm TPN which likely contributed to moderate weight loss over last few months. Pt expressed weakness and discomfort on her tailbone region. Noted carmen score of 13 and stage 2 pressure ulcer on sacral spine. Reviewed importance of protein for healing of ulceration and avoiding further unintentional wt loss. Pt receptive and expressed understanding. RD to follow.     Previous Nutrition Diagnosis:  inadequate energy intake RT current NPO status AEB 0% EER being met at present  Active [   ]  Resolved [ x  ] 2/2 diet advancement     If resolved, new PES:   increased nutrient needs RT increased demand for kcal/pro AEB wt loss, pressure ulcer, catabolic illness    Goal:  Pt to consistently meet % of estimated needs PO     Recommendations:  1) Continue on current diet order  2) Encourage ONS between meals  3) Trend weights  4) Encourage protein options  5) MVI daily    Education:   Increased needs, strategies for optimizing kcal/pro intake     Risk Level: High [  x ] Moderate [   ] Low [   ]

## 2019-05-06 NOTE — CONSULT NOTE ADULT - PROBLEM SELECTOR RECOMMENDATION 5
complicated by problem #4 above; requiring indwelling joseph catheter with multiple recent urologic infections w/ drug resistant organisms  - ABx therapy as above in problem #1  - appreciate urologic evaluation via gyn-onc
oob and increase ambulation

## 2019-05-06 NOTE — PROGRESS NOTE ADULT - PROBLEM SELECTOR PLAN 2
stage IV endometrial adenocarcinoma; per GYN-ONC w/ interval increase in tumor burden  - management per GYN-ON[facility]  - not currently on chemotherapy secondary to performance status and active infection  - patient states that her "cancer is gone, that they removed it all at [facility]"  may have poor understanding of her situation. After discussion w/ gyn-onc today, we gently introduced the idea to the patient that cancer can return and that this is likely what has happened to her. Will continue to have ongoing conversation about this with her.

## 2019-05-06 NOTE — PROGRESS NOTE ADULT - ASSESSMENT
58yo F HD8 with stage IV endometrial adenocarcinoma s/p staging surgery '18 and 1 round of chemotherapy 2/19 readmitted from [facility] with fever, previously admitted for management of symptomatic rectovaginal and enterovaginal fistulas. Presents on referral from [facility] with urosepsis.  Rapid response called due to worsening respiratory status and mental status, pt intubated and transferred to MICU. stabilized, extubated transferred to [facility].  Pending Neuro, Medicine (Matt), and Urology Recommendations.    1. Neuro: extubated. CIDP s/p IVIG, next due on 5/9, also post polio contributing to weakness.   Neurology reconsulted. (Dr. Sellers)   2. Pulm: 2L NC, mild pulmonary edema, stable, encourage IS  3. Cardio: HD stable  4. FEN/GI: tolerating reg diet.  5. : joseph in place. suspect urosepsis. consult urology. possible suprapubic catheter.  6. ID: IV ertapenem until 5/14, s/p meropenem, s/p vanc  7. Endocrine: FS, ISS, Metformin 1000mg BID  8. VTE prophylaxis - SCDs, Lovenox 40 daily   9. GYN malignancy  -stopped anastrazole and initiated 3 weeks of megace 80mg BID followed by 3 weeks of tamoxifen. started megace and metformin 4/10, now on Tamoxifen (5/1/19-  10. Derm: monitor sacrum for s/s of pressure ulcer. encourage frequent changes in position and OOB during  the day  11. social work/dispo planning as patient will go back to [facility].

## 2019-05-06 NOTE — PROGRESS NOTE ADULT - SUBJECTIVE AND OBJECTIVE BOX
GYN Progress Note    Patient seen at bedside.  Patient denies pain, tolerating reg  OOB to chair  Denies SOB, chest pain, fevers/chills.    Vital Signs Last 24 Hrs  T(C): 37.1 (06 May 2019 14:30), Max: 37.2 (05 May 2019 17:23)  T(F): 98.7 (06 May 2019 14:30), Max: 99 (05 May 2019 17:23)  HR: 80 (06 May 2019 14:30) (80 - 104)  BP: 101/69 (06 May 2019 14:30) (91/66 - 109/74)  BP(mean): --  RR: 18 (06 May 2019 14:30) (16 - 18)  SpO2: 100% (06 May 2019 14:30) (88% - 100%)    Physical Exam:  Gen: No Acute Distress  Pulm: Decreased breath sounds bilaterally  GI: soft, nontender, nondistended, no rebound, no guarding.  : Westbrook in place  Ext: SCDs in place, wnl    I&O's Summary    05 May 2019 07:01  -  06 May 2019 07:00  --------------------------------------------------------  IN: 0 mL / OUT: 1900 mL / NET: -1900 mL    06 May 2019 07:01  -  06 May 2019 15:18  --------------------------------------------------------  IN: 480 mL / OUT: 350 mL / NET: 130 mL      MEDICATIONS  (STANDING):  dextrose 5%. 1000 milliLiter(s) (50 mL/Hr) IV Continuous <Continuous>  dextrose 50% Injectable 25 Gram(s) IV Push once  enoxaparin Injectable 40 milliGRAM(s) SubCutaneous every 24 hours  ertapenem  IVPB 1000 milliGRAM(s) IV Intermittent every 24 hours  insulin lispro (HumaLOG) corrective regimen sliding scale   SubCutaneous Before meals and at bedtime  metFORMIN 1000 milliGRAM(s) Oral two times a day  pantoprazole   Suspension 40 milliGRAM(s) Oral daily  petrolatum Ophthalmic Ointment 1 Application(s) Both EYES three times a day  tamoxifen 20 milliGRAM(s) Oral two times a day  zinc oxide 20% Ointment 1 Application(s) Topical three times a day    MEDICATIONS  (PRN):  acetaminophen   Tablet .. 650 milliGRAM(s) Oral every 6 hours PRN Temp greater or equal to 38C (100.4F), Mild Pain (1 - 3)  glucagon  Injectable 1 milliGRAM(s) IntraMuscular once PRN Glucose LESS THAN 70 milligrams/deciliter  ondansetron Injectable 8 milliGRAM(s) IV Push every 8 hours PRN Nausea and/or Vomiting      LABS:                        8.6    7.72  )-----------( 196      ( 06 May 2019 05:54 )             30.1     05-06    143  |  100  |  14  ----------------------------<  92  4.7   |  35<H>  |  0.43<L>    Ca    9.7      06 May 2019 05:54  Phos  3.5     05-06  Mg     1.3     05-06

## 2019-05-06 NOTE — PROGRESS NOTE ADULT - ASSESSMENT
60yo F HD8 with stage IV endometrial adenocarcinoma s/p staging surgery '18 and 1 round of chemotherapy 2/19 readmitted from [facility] with fever, previously admitted for management of symptomatic rectovaginal and enterovaginal fistulas. Presents on referral from [facility] with urosepsis.  Rapid response called due to worsening respiratory status and mental status, pt intubated and transferred to MICU. stabilized, extubated transferred to GYN [facility]    1. Neuro: extubated.    CIDP s/p IVIG, next due on 5/9, also post polio contributing to weakness. Will need neuro to consult again (Dr. Sellers) regarding patient's strength. Per the MICU team who spoke with him, pt may need immunosuppression for this but cannot do that given her clinical picture.  2. Pulm: 2L NC as needed  3. Cardio: HD stable  4. FEN/GI: s/p dobhoff, now on soft diet, off IVH  5. : joseph in place. suspect urosepsis. consult urology. consider suprapubic catheter. attending to round tomorrow.  6. ID: ertapenem until 5/14, s/p meropenem, s/p vanc  7. Endocrine: FS, ISS, metformin for improved mortality when taking PO  8. VTE prophylaxis - SCDs, Lovenox 40 daily   9. GYN malignancy  -stopped anastrazole and initiated 3 weeks of megace 80mg BID followed by 3 weeks of tamoxifen. started megace and metformin 4/10. due to start tamoxifen today 5/1 but will delay until pt is taking po. will address this today. signing chemo form and will likely start Tamoxifen 20mg BID x 21 days). please also start metformin 1000mg BID for improved outcomes.  10. Derm: monitor sacrum for s/s of pressure ulcer. encourage frequent changes in position and OOB during  the day  11. social work/dispo planning as patient will go back to [facility]. 60yo F HD8 with stage IV endometrial adenocarcinoma s/p staging surgery '18 and 1 round of chemotherapy 2/19 readmitted from [facility] with fever, previously admitted for management of symptomatic rectovaginal and enterovaginal fistulas. Presents on referral from [facility] with urosepsis.  Rapid response called due to worsening respiratory status and mental status, pt intubated and transferred to MICU. stabilized, extubated transferred to GYN [facility]    1. Neuro: extubated.    CIDP s/p IVIG, next due on 5/9, also post polio contributing to weakness. Will need neuro to consult again (Dr. Sellers) regarding patient's strength. Per the MICU team who spoke with him, pt may need immunosuppression for this but cannot do that given her clinical picture.  2. Pulm: 2L NC as needed  3. Cardio: HD stable  4. FEN/GI: s/p dobhoff, now on soft diet, off IVH  5. : joseph in place. suspect urosepsis. consult urology. consider suprapubic catheter. attending to round tomorrow.  6. ID: ertapenem until 5/14, s/p meropenem, s/p vanc  7. Endocrine: FS, ISS, metformin for improved mortality when taking PO  8. VTE prophylaxis - SCDs, Lovenox 40 daily   9. GYN malignancy  -stopped anastrazole and initiated 3 weeks of megace 80mg BID followed by 3 weeks of tamoxifen. started megace and metformin 4/10, now on Tamoxifen (5/1/19-  10. Derm: monitor sacrum for s/s of pressure ulcer. encourage frequent changes in position and OOB during  the day  11. social work/dispo planning as patient will go back to [facility].

## 2019-05-07 DIAGNOSIS — E83.42 HYPOMAGNESEMIA: ICD-10-CM

## 2019-05-07 DIAGNOSIS — I47.2 VENTRICULAR TACHYCARDIA: ICD-10-CM

## 2019-05-07 DIAGNOSIS — G61.81 CHRONIC INFLAMMATORY DEMYELINATING POLYNEURITIS: ICD-10-CM

## 2019-05-07 DIAGNOSIS — A41.9 SEPSIS, UNSPECIFIED ORGANISM: ICD-10-CM

## 2019-05-07 DIAGNOSIS — Z85.3 PERSONAL HISTORY OF MALIGNANT NEOPLASM OF BREAST: ICD-10-CM

## 2019-05-07 DIAGNOSIS — Z29.9 ENCOUNTER FOR PROPHYLACTIC MEASURES, UNSPECIFIED: ICD-10-CM

## 2019-05-07 DIAGNOSIS — G14 POSTPOLIO SYNDROME: ICD-10-CM

## 2019-05-07 DIAGNOSIS — C56.9 MALIGNANT NEOPLASM OF UNSPECIFIED OVARY: ICD-10-CM

## 2019-05-07 DIAGNOSIS — R33.9 RETENTION OF URINE, UNSPECIFIED: ICD-10-CM

## 2019-05-07 DIAGNOSIS — C54.1 MALIGNANT NEOPLASM OF ENDOMETRIUM: ICD-10-CM

## 2019-05-07 LAB
ALBUMIN SERPL ELPH-MCNC: 3 G/DL — LOW (ref 3.3–5)
ALP SERPL-CCNC: 95 U/L — SIGNIFICANT CHANGE UP (ref 40–120)
ALT FLD-CCNC: 15 U/L — SIGNIFICANT CHANGE UP (ref 10–45)
ANION GAP SERPL CALC-SCNC: 9 MMOL/L — SIGNIFICANT CHANGE UP (ref 5–17)
AST SERPL-CCNC: 12 U/L — SIGNIFICANT CHANGE UP (ref 10–40)
BILIRUB SERPL-MCNC: <0.2 MG/DL — SIGNIFICANT CHANGE UP (ref 0.2–1.2)
BUN SERPL-MCNC: 13 MG/DL — SIGNIFICANT CHANGE UP (ref 7–23)
CALCIUM SERPL-MCNC: 9.5 MG/DL — SIGNIFICANT CHANGE UP (ref 8.4–10.5)
CHLORIDE SERPL-SCNC: 96 MMOL/L — SIGNIFICANT CHANGE UP (ref 96–108)
CO2 SERPL-SCNC: 36 MMOL/L — HIGH (ref 22–31)
CREAT SERPL-MCNC: 0.4 MG/DL — LOW (ref 0.5–1.3)
GLUCOSE BLDC GLUCOMTR-MCNC: 123 MG/DL — HIGH (ref 70–99)
GLUCOSE BLDC GLUCOMTR-MCNC: 130 MG/DL — HIGH (ref 70–99)
GLUCOSE BLDC GLUCOMTR-MCNC: 131 MG/DL — HIGH (ref 70–99)
GLUCOSE BLDC GLUCOMTR-MCNC: 136 MG/DL — HIGH (ref 70–99)
GLUCOSE BLDC GLUCOMTR-MCNC: 143 MG/DL — HIGH (ref 70–99)
GLUCOSE SERPL-MCNC: 125 MG/DL — HIGH (ref 70–99)
HCT VFR BLD CALC: 30.3 % — LOW (ref 34.5–45)
HGB BLD-MCNC: 8.6 G/DL — LOW (ref 11.5–15.5)
MAGNESIUM SERPL-MCNC: 2.2 MG/DL — SIGNIFICANT CHANGE UP (ref 1.6–2.6)
MCHC RBC-ENTMCNC: 28.4 GM/DL — LOW (ref 32–36)
MCHC RBC-ENTMCNC: 29.1 PG — SIGNIFICANT CHANGE UP (ref 27–34)
MCV RBC AUTO: 102.4 FL — HIGH (ref 80–100)
NRBC # BLD: 0 /100 WBCS — SIGNIFICANT CHANGE UP (ref 0–0)
PHOSPHATE SERPL-MCNC: 3.3 MG/DL — SIGNIFICANT CHANGE UP (ref 2.5–4.5)
PLATELET # BLD AUTO: 266 K/UL — SIGNIFICANT CHANGE UP (ref 150–400)
POTASSIUM SERPL-MCNC: 4.7 MMOL/L — SIGNIFICANT CHANGE UP (ref 3.5–5.3)
POTASSIUM SERPL-SCNC: 4.7 MMOL/L — SIGNIFICANT CHANGE UP (ref 3.5–5.3)
PROT SERPL-MCNC: 7.1 G/DL — SIGNIFICANT CHANGE UP (ref 6–8.3)
RBC # BLD: 2.96 M/UL — LOW (ref 3.8–5.2)
RBC # FLD: 14.8 % — HIGH (ref 10.3–14.5)
SODIUM SERPL-SCNC: 141 MMOL/L — SIGNIFICANT CHANGE UP (ref 135–145)
WBC # BLD: 6.9 K/UL — SIGNIFICANT CHANGE UP (ref 3.8–10.5)
WBC # FLD AUTO: 6.9 K/UL — SIGNIFICANT CHANGE UP (ref 3.8–10.5)

## 2019-05-07 PROCEDURE — 99223 1ST HOSP IP/OBS HIGH 75: CPT

## 2019-05-07 PROCEDURE — 71045 X-RAY EXAM CHEST 1 VIEW: CPT | Mod: 26

## 2019-05-07 PROCEDURE — 99233 SBSQ HOSP IP/OBS HIGH 50: CPT | Mod: GC

## 2019-05-07 PROCEDURE — 99231 SBSQ HOSP IP/OBS SF/LOW 25: CPT

## 2019-05-07 RX ORDER — DIPHENHYDRAMINE HCL 50 MG
25 CAPSULE ORAL ONCE
Qty: 0 | Refills: 0 | Status: DISCONTINUED | OUTPATIENT
Start: 2019-05-07 | End: 2019-05-07

## 2019-05-07 RX ORDER — ACETAMINOPHEN 500 MG
1000 TABLET ORAL ONCE
Qty: 0 | Refills: 0 | Status: COMPLETED | OUTPATIENT
Start: 2019-05-07 | End: 2019-05-07

## 2019-05-07 RX ORDER — ACETAMINOPHEN 500 MG
650 TABLET ORAL ONCE
Qty: 0 | Refills: 0 | Status: DISCONTINUED | OUTPATIENT
Start: 2019-05-07 | End: 2019-05-08

## 2019-05-07 RX ORDER — DIPHENHYDRAMINE HCL 50 MG
25 CAPSULE ORAL ONCE
Qty: 0 | Refills: 0 | Status: COMPLETED | OUTPATIENT
Start: 2019-05-07 | End: 2019-05-07

## 2019-05-07 RX ORDER — MAGNESIUM SULFATE 500 MG/ML
2 VIAL (ML) INJECTION ONCE
Qty: 0 | Refills: 0 | Status: COMPLETED | OUTPATIENT
Start: 2019-05-07 | End: 2019-05-07

## 2019-05-07 RX ORDER — IMMUNE GLOBULIN (HUMAN) 10 G/100ML
30 INJECTION INTRAVENOUS; SUBCUTANEOUS ONCE
Qty: 0 | Refills: 0 | Status: DISCONTINUED | OUTPATIENT
Start: 2019-05-07 | End: 2019-05-07

## 2019-05-07 RX ORDER — FUROSEMIDE 40 MG
40 TABLET ORAL ONCE
Qty: 0 | Refills: 0 | Status: COMPLETED | OUTPATIENT
Start: 2019-05-07 | End: 2019-05-08

## 2019-05-07 RX ORDER — IMMUNE GLOBULIN (HUMAN) 10 G/100ML
29 INJECTION INTRAVENOUS; SUBCUTANEOUS ONCE
Qty: 0 | Refills: 0 | Status: DISCONTINUED | OUTPATIENT
Start: 2019-05-07 | End: 2019-05-07

## 2019-05-07 RX ORDER — IMMUNE GLOBULIN,GAMMA(IGG) 5 %
30 VIAL (ML) INTRAVENOUS ONCE
Qty: 0 | Refills: 0 | Status: COMPLETED | OUTPATIENT
Start: 2019-05-07 | End: 2019-05-07

## 2019-05-07 RX ADMIN — Medication 1 APPLICATION(S): at 22:26

## 2019-05-07 RX ADMIN — PANTOPRAZOLE SODIUM 40 MILLIGRAM(S): 20 TABLET, DELAYED RELEASE ORAL at 13:56

## 2019-05-07 RX ADMIN — Medication 1 APPLICATION(S): at 13:56

## 2019-05-07 RX ADMIN — ZINC OXIDE 1 APPLICATION(S): 200 OINTMENT TOPICAL at 13:56

## 2019-05-07 RX ADMIN — Medication 50 GRAM(S): at 19:48

## 2019-05-07 RX ADMIN — Medication 400 MILLIGRAM(S): at 18:50

## 2019-05-07 RX ADMIN — Medication 1 APPLICATION(S): at 05:27

## 2019-05-07 RX ADMIN — TAMOXIFEN CITRATE 20 MILLIGRAM(S): 20 TABLET, FILM COATED ORAL at 05:27

## 2019-05-07 RX ADMIN — Medication 50 GRAM(S): at 09:51

## 2019-05-07 RX ADMIN — METFORMIN HYDROCHLORIDE 1000 MILLIGRAM(S): 850 TABLET ORAL at 09:51

## 2019-05-07 RX ADMIN — Medication 25 MILLIGRAM(S): at 18:50

## 2019-05-07 RX ADMIN — ZINC OXIDE 1 APPLICATION(S): 200 OINTMENT TOPICAL at 05:27

## 2019-05-07 RX ADMIN — ERTAPENEM SODIUM 120 MILLIGRAM(S): 1 INJECTION, POWDER, LYOPHILIZED, FOR SOLUTION INTRAMUSCULAR; INTRAVENOUS at 21:26

## 2019-05-07 RX ADMIN — METFORMIN HYDROCHLORIDE 1000 MILLIGRAM(S): 850 TABLET ORAL at 17:39

## 2019-05-07 RX ADMIN — ZINC OXIDE 1 APPLICATION(S): 200 OINTMENT TOPICAL at 22:26

## 2019-05-07 RX ADMIN — TAMOXIFEN CITRATE 20 MILLIGRAM(S): 20 TABLET, FILM COATED ORAL at 17:39

## 2019-05-07 NOTE — CONSULT NOTE ADULT - ASSESSMENT
58yo F PMHx polio, breast cancer, DM, Endometrial Cancer s/p exploratory laparotomy, enterolysis, SHAMIR, BSO, pelvic lymphadenectomy, low anterior resection mobilization of splenic flexure, end colostomy on 7/30/18, rectovaginal fistula, numerous admissions for urinary tract infection presented to [facility] in the setting of urosepsis. Hospital course complicated by blood stream infection (currently on ertapenem) and respiratory failure requiring intubation s/p extubation on 5/2. 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 Patient w/ worsening lower extremity weakness on neuro exam.     - give IVIG 500mg/kg today w/ Lasix in setting of fluid overload   - will re-assess need for tomorrow     Neurology will follow. 60yo F PMHx polio, breast cancer, DM, Endometrial Cancer s/p exploratory laparotomy, enterolysis, SHAMIR, BSO, pelvic lymphadenectomy, low anterior resection mobilization of splenic flexure, end colostomy on 7/30/18, rectovaginal fistula, numerous admissions for urinary tract infection presented to [facility] in the setting of urosepsis. Hospital course complicated by blood stream infection (currently on ertapenem) and respiratory failure requiring intubation s/p extubation on 5/2. 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 Patient w/ worsening lower extremity weakness on neuro exam.   Also new hypoxia, found to have significant pleural effusions for which she is being diuresed    Etiology of recent decline is weakness unclear given multiple simultaneous medical issues, however exacerbation of her CIDP is a possibility.      -Agree with IVIG 500mg/kg daily for 2 days w/ Lasix in setting of fluid overload - will determine whether to give more depending on response  -To prevent hemolytic anemia, given A bloodtype, will need anti A antibody depleted formulation      Neurology will follow.

## 2019-05-07 NOTE — PROGRESS NOTE ADULT - PROBLEM SELECTOR PLAN 4
Initially requiring levophed, weaned off on 5/1 am  - blood and urine cultures from 4/30 growing ESBL Ecoli  sensitive to meropenem  repeat Bcx NGTD  -C/w ertapenem 1g q24

## 2019-05-07 NOTE — PROGRESS NOTE ADULT - PROBLEM SELECTOR PLAN 6
Suspected per discussion w/ neurology based on EMG data.  - per neurology and primary team planned for IVIG +/- steroids  - appreciate neuro input  - mgmt otherwise as above in problem #5 Suspected per discussion w/ neurology based on EMG data.  - per neurology and primary team planned for IVIG +/- steroids  - appreciate neuro input  - mgmt otherwise as above in problem #5  -Prognosis is guarded

## 2019-05-07 NOTE — PROGRESS NOTE ADULT - SUBJECTIVE AND OBJECTIVE BOX
INTERVAL HPI/OVERNIGHT EVENTS:  Patient was seen and examined at bedside. As per nurse and patient, no o/n events, patient resting comfortably. No complaints at this time. Patient denies: fever, chills, dizziness, weakness, HA, Changes in vision, CP, palpitations, SOB, cough, N/V/D/C, dysuria, changes in bowel movements, LE edema. ROS otherwise negative.    VITAL SIGNS:  T(F): 98.3 (05-07-19 @ 13:00)  HR: 98 (05-07-19 @ 11:15)  BP: 113/57 (05-07-19 @ 11:15)  RR: 16 (05-07-19 @ 11:15)  SpO2: 99% (05-07-19 @ 11:15)  Wt(kg): --    PHYSICAL EXAM:    Constitutional: WDWN, NAD  HEENT: PERRL, EOMI, sclera non-icteric, neck supple, trachea midline, no masses, no JVD, MMM, good dentition  Respiratory: CTA b/l, good air entry b/l, no wheezing, no rhonchi, no rales, without accessory muscle use and no intercostal retractions  Cardiovascular: RRR, normal S1S2, no M/R/G  Gastrointestinal: soft, NTND, no masses palpable, BS normal  Extremities: Warm, well perfused, pulses equal bilateral upper and lower extremities, no edema, no clubbing  Neurological: AAOx3, CN Grossly intact  Skin: Normal temperature, warm, dry    MEDICATIONS  (STANDING):  dextrose 5%. 1000 milliLiter(s) (50 mL/Hr) IV Continuous <Continuous>  dextrose 50% Injectable 25 Gram(s) IV Push once  enoxaparin Injectable 40 milliGRAM(s) SubCutaneous every 24 hours  ertapenem  IVPB 1000 milliGRAM(s) IV Intermittent every 24 hours  insulin lispro (HumaLOG) corrective regimen sliding scale   SubCutaneous Before meals and at bedtime  metFORMIN 1000 milliGRAM(s) Oral two times a day  pantoprazole   Suspension 40 milliGRAM(s) Oral daily  petrolatum Ophthalmic Ointment 1 Application(s) Both EYES three times a day  tamoxifen 20 milliGRAM(s) Oral two times a day  zinc oxide 20% Ointment 1 Application(s) Topical three times a day    MEDICATIONS  (PRN):  acetaminophen   Tablet .. 650 milliGRAM(s) Oral every 6 hours PRN Temp greater or equal to 38C (100.4F), Mild Pain (1 - 3)  glucagon  Injectable 1 milliGRAM(s) IntraMuscular once PRN Glucose LESS THAN 70 milligrams/deciliter  ondansetron Injectable 8 milliGRAM(s) IV Push every 8 hours PRN Nausea and/or Vomiting      Allergies    No Known Allergies    Intolerances        LABS:                        8.6    6.90  )-----------( 266      ( 07 May 2019 06:52 )             30.3     05-07    141  |  96  |  13  ----------------------------<  125<H>  4.7   |  36<H>  |  0.40<L>    Ca    9.5      07 May 2019 06:52  Phos  3.3     05-07  Mg     2.2     05-07    TPro  7.1  /  Alb  3.0<L>  /  TBili  <0.2  /  DBili  x   /  AST  12  /  ALT  15  /  AlkPhos  95  05-07          RADIOLOGY & ADDITIONAL TESTS:  Reviewed Transfer Acceptance Note: [facility] to [facility]  59 F w/ Stage IV Endometrial Carcinoma s/p staging surgery 7/2018 and 1 cycle of chemo in 2/2019, DM, low anterior resection mobilization of splenic flexure, end colostomy w/ rectovaginal fistula w/ numerous admissions for urinary tract infection and bacteremia presents on referral from [facility] with urosepsis. ICU consulted for respiratory failure with worsening mental status change and unresponsiveness. Patient has extensive UTI/Pyelonephritis history, was being treated with Ertapenem as outpatient, on admission was seen by ID who recommended Meropenam and Vancomycin. Rapid response called on day of admission for tachycardia > 130, acute mental status change, decreased responsiveness. Patient intubated while in ER holding. patient was weaned off levophed and was extubated on 5/2. Surveillance blood cultures were negative. Both urine and initial blood cx growing ESBL e coli sensitive to meropenem. Patient transitioned to ertapenem. Surveillance cultures have since finalized and are negative. Patient has exhibited critical illness myopathy and this AM had 7 beats of ventricular tachycardia.     INTERVAL HPI/OVERNIGHT EVENTS:  Patient was seen and examined at bedside. States biggest concern concern is rapidity of LE weakness onset, before was able to participate at PT.  Tolerating po intake. Patient denies: fever, chills, dizziness, , HA, Changes in vision, CP, palpitations, SOB, cough, N/V/D/C, dysuria, changes in bowel movements, LE edema. ROS otherwise negative.    VITAL SIGNS:  T(F): 98.3 (05-07-19 @ 13:00)  HR: 98 (05-07-19 @ 11:15)  BP: 113/57 (05-07-19 @ 11:15)  RR: 16 (05-07-19 @ 11:15)  SpO2: 99% (05-07-19 @ 11:15)  Wt(kg): --    PHYSICAL EXAM:    Constitutional: pale, debilitated appearing, lethargic  HEENT: PERRL, EOMI, sclera non-icteric, neck supple, trachea midline, no masses, no JVD, MMM, good dentition  Respiratory: CTA b/l, good air entry b/l, no wheezing, no rhonchi, no rales, without accessory muscle use and no intercostal retractions  Cardiovascular: RRR, normal S1S2, no M/R/G  Gastrointestinal: soft, NTND, no masses palpable, BS normal  RUE: 4/5 bilat, LE: -3/5 poor muscle tone, poor tone in trunk, sluggish H test   Extremities: Warm, well perfused, pulses equal bilateral upper and lower extremities, 2+ edema, no clubbing  Neurological: AAOx3, CN Grossly intact  Skin: Normal temperature, warm, dry    MEDICATIONS  (STANDING):  dextrose 5%. 1000 milliLiter(s) (50 mL/Hr) IV Continuous <Continuous>  dextrose 50% Injectable 25 Gram(s) IV Push once  enoxaparin Injectable 40 milliGRAM(s) SubCutaneous every 24 hours  ertapenem  IVPB 1000 milliGRAM(s) IV Intermittent every 24 hours  insulin lispro (HumaLOG) corrective regimen sliding scale   SubCutaneous Before meals and at bedtime  metFORMIN 1000 milliGRAM(s) Oral two times a day  pantoprazole   Suspension 40 milliGRAM(s) Oral daily  petrolatum Ophthalmic Ointment 1 Application(s) Both EYES three times a day  tamoxifen 20 milliGRAM(s) Oral two times a day  zinc oxide 20% Ointment 1 Application(s) Topical three times a day    MEDICATIONS  (PRN):  acetaminophen   Tablet .. 650 milliGRAM(s) Oral every 6 hours PRN Temp greater or equal to 38C (100.4F), Mild Pain (1 - 3)  glucagon  Injectable 1 milliGRAM(s) IntraMuscular once PRN Glucose LESS THAN 70 milligrams/deciliter  ondansetron Injectable 8 milliGRAM(s) IV Push every 8 hours PRN Nausea and/or Vomiting      Allergies    No Known Allergies    Intolerances        LABS:                        8.6    6.90  )-----------( 266      ( 07 May 2019 06:52 )             30.3     05-07    141  |  96  |  13  ----------------------------<  125<H>  4.7   |  36<H>  |  0.40<L>    Ca    9.5      07 May 2019 06:52  Phos  3.3     05-07  Mg     2.2     05-07    TPro  7.1  /  Alb  3.0<L>  /  TBili  <0.2  /  DBili  x   /  AST  12  /  ALT  15  /  AlkPhos  95  05-07          RADIOLOGY & ADDITIONAL TESTS:  Reviewed

## 2019-05-07 NOTE — PROGRESS NOTE ADULT - PROBLEM SELECTOR PLAN 6
could be related to diastolic dysfunction and will no evidence of cor pulmonale.  repeat the echo.  previous venous doppler was negative

## 2019-05-07 NOTE — PROGRESS NOTE ADULT - SUBJECTIVE AND OBJECTIVE BOX
GYN Progress Note    Seen at bedside. resting/sleepy. no complaints.  during rounds, pt on tele and had  8 "runs" of vtach ( in 1 run). then to NSR. asymptomatic.     Denies CP, palpitations, SOB, fever, chills, nausea, vomiting.    Vital Signs Last 24 Hrs  T(C): 37.3 (07 May 2019 08:00), Max: 37.6 (07 May 2019 05:02)  T(F): 99.2 (07 May 2019 08:00), Max: 99.7 (07 May 2019 05:02)  HR: 78 (07 May 2019 08:00) (78 - 97)  BP: 108/61 (07 May 2019 08:00) (101/69 - 113/59)  RR: 17 (07 May 2019 08:00) (16 - 18)  SpO2: 100% (07 May 2019 08:00) (97% - 100%)    Physical Exam:  Gen: No Acute Distress  Pulm: cta b/l, decreased breath sounds left lung base  GI: soft, appropriately nontender, nondistended, no rebound, no guarding, ostomy with output  Ext: SCDs in place, wnl. strength 1/5.    I&O's Summary    06 May 2019 07:01  -  07 May 2019 07:00  --------------------------------------------------------  IN: 480 mL / OUT: 975 mL / NET: -495 mL    07 May 2019 07:01  -  07 May 2019 10:57  --------------------------------------------------------  IN: 50 mL / OUT: 500 mL / NET: -450 mL      MEDICATIONS  (STANDING):  dextrose 5%. 1000 milliLiter(s) (50 mL/Hr) IV Continuous <Continuous>  dextrose 50% Injectable 25 Gram(s) IV Push once  enoxaparin Injectable 40 milliGRAM(s) SubCutaneous every 24 hours  ertapenem  IVPB 1000 milliGRAM(s) IV Intermittent every 24 hours  insulin lispro (HumaLOG) corrective regimen sliding scale   SubCutaneous Before meals and at bedtime  metFORMIN 1000 milliGRAM(s) Oral two times a day  pantoprazole   Suspension 40 milliGRAM(s) Oral daily  petrolatum Ophthalmic Ointment 1 Application(s) Both EYES three times a day  tamoxifen 20 milliGRAM(s) Oral two times a day  zinc oxide 20% Ointment 1 Application(s) Topical three times a day    MEDICATIONS  (PRN):  acetaminophen   Tablet .. 650 milliGRAM(s) Oral every 6 hours PRN Temp greater or equal to 38C (100.4F), Mild Pain (1 - 3)  glucagon  Injectable 1 milliGRAM(s) IntraMuscular once PRN Glucose LESS THAN 70 milligrams/deciliter  ondansetron Injectable 8 milliGRAM(s) IV Push every 8 hours PRN Nausea and/or Vomiting      LABS:                        8.6    6.90  )-----------( 266      ( 07 May 2019 06:52 )             30.3     05-07    141  |  96  |  13  ----------------------------<  125<H>  4.7   |  36<H>  |  0.40<L>    Ca    9.5      07 May 2019 06:52  Phos  3.3     05-07  Mg     2.2     05-07    TPro  7.1  /  Alb  3.0<L>  /  TBili  <0.2  /  DBili  x   /  AST  12  /  ALT  15  /  AlkPhos  95  05-07

## 2019-05-07 NOTE — PROGRESS NOTE ADULT - PROBLEM SELECTOR PLAN 7
Pt had rectovaginal (resolving?) and enterovaginal fistulas   -  evaluated and discussed option of supra-pubic catheter that may decrease, but not eliminate her risk/propensity for recurrent urinary infections. Westbrook since removed; awaiting further mgmt/recs on this matter.

## 2019-05-07 NOTE — PROGRESS NOTE ADULT - PROBLEM SELECTOR PLAN 8
Continued providing counseling and support to patient today.  - Patient says her sister Esha arrives around 3:30-4pm [place] [employer] [place]; contacting daughter today to arrange for in-person meeting hopefully by Thursday.   - Case discussed w/ gyn attending as well  - Dr. Gutierrez now managing case on his [facility]

## 2019-05-07 NOTE — PROGRESS NOTE ADULT - PROBLEM SELECTOR PLAN 7
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

## 2019-05-07 NOTE — PROGRESS NOTE ADULT - ATTENDING COMMENTS
Patient was given intermittently diuretics for fluid overload. The patient had fluid resuscitation for a septic shock and most likely has increased ability permeability with hypoalbuminemia secondary to her sepsis and malnutrition. Previously the patient was NPO and TP and you to the fistula. Observe and diuretics as needed basis  Patient seen and examined with house-staff during bedside rounds.  Resident note read, including vitals, physical findings, laboratory data, and radiological reports.   Revisions included below.  Direct personal management at bed side and extensive interpretation of the data.  Plan was outlined and discussed in details with the housestaff.  Decision making of high complexity  Action taken for acute disease activity to reflect the level of care provided:  - medication reconciliation  - review laboratory data  transfer to my service discussed with gyn and CCM

## 2019-05-07 NOTE — PROGRESS NOTE ADULT - PROBLEM SELECTOR PLAN 3
Rapid response called for tachycardia > 130, acute mental status change, decreased responsiveness. Patient intubated while in ER holding 4/30-5/2 now still high O2 requirements, suspect pulm edema vs NM etiology ? (CIDP vs post-polio?)

## 2019-05-07 NOTE — PROGRESS NOTE ADULT - PROBLEM SELECTOR PLAN 5
#Neurogenic bladder.    -requiring indwelling joseph catheter with multiple recent urologic infections w/ drug resistant organisms  - ABx therapy as above in problem #1  - appreciate urologic management/recommendations as above.

## 2019-05-07 NOTE — PROGRESS NOTE ADULT - PROBLEM SELECTOR PLAN 3
rectovaginal and enterovaginal fistulas developed secondary to problem #2 and staging surgery in Fall 2018  -  evaluated and discussed option of supra-pubic catheter that may decrease, but not eliminate her risk/propensity for recurrent urinary infections. Westbrook since removed; awaiting further mgmt/recs on this matter

## 2019-05-07 NOTE — CONSULT NOTE ADULT - SUBJECTIVE AND OBJECTIVE BOX
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    MEDICATIONS  (STANDING):  dextrose 5%. 1000 milliLiter(s) (50 mL/Hr) IV Continuous <Continuous>  dextrose 50% Injectable 25 Gram(s) IV Push once  enoxaparin Injectable 40 milliGRAM(s) SubCutaneous every 24 hours  ertapenem  IVPB 1000 milliGRAM(s) IV Intermittent every 24 hours  insulin lispro (HumaLOG) corrective regimen sliding scale   SubCutaneous Before meals and at bedtime  metFORMIN 1000 milliGRAM(s) Oral two times a day  pantoprazole   Suspension 40 milliGRAM(s) Oral daily  petrolatum Ophthalmic Ointment 1 Application(s) Both EYES three times a day  tamoxifen 20 milliGRAM(s) Oral two times a day  zinc oxide 20% Ointment 1 Application(s) Topical three times a day    MEDICATIONS  (PRN):  acetaminophen   Tablet .. 650 milliGRAM(s) Oral every 6 hours PRN Temp greater or equal to 38C (100.4F), Mild Pain (1 - 3)  glucagon  Injectable 1 milliGRAM(s) IntraMuscular once PRN Glucose LESS THAN 70 milligrams/deciliter  ondansetron Injectable 8 milliGRAM(s) IV Push every 8 hours PRN Nausea and/or Vomiting      Allergies    No Known Allergies    Intolerances        FAMILY HISTORY:      SOCIAL HISTORY:  Living Situation:  Occupation:  Tobacco:  Alcohol:   Drug use:      VITAL SIGNS:  Vital Signs Last 24 Hrs  T(C): 36.8 (07 May 2019 13:00), Max: 37.6 (07 May 2019 05:02)  T(F): 98.3 (07 May 2019 13:00), Max: 99.7 (07 May 2019 05:02)  HR: 98 (07 May 2019 11:15) (78 - 98)  BP: 113/57 (07 May 2019 11:15) (102/55 - 113/59)  BP(mean): --  RR: 16 (07 May 2019 11:15) (16 - 18)  SpO2: 99% (07 May 2019 11:15) (97% - 100%)    PHYSICAL EXAMINATION:  Constitutional: WDWN; NAD  Eyes: Conjunctiva and sclera clear.  Cardiovascular: Regular rate and rhythm; S1 and S2 Normal; No murmurs, gallops or rubs.  Neurologic:  - Mental Status:  AAOx3; speech is fluent with intact naming, repetition, and comprehension; immediate recall is 3/3 words and delayed recall is 3/3 words at 5 minutes; able to spell WORLD backwards and perform serial 7 subtraction; able to read and write a sentence; able to copy a cube; Good overall fund of knowledge.  - Cranial Nerves II-XII:    II:  Visual acuity is 20/20 bilaterally; Visual fields are full to confrontation; Fundoscopic exam is normal with sharp discs; Pupils are equal, round, and reactive to light.  III, IV, VI:  Extraocular movements are intact without nystagmus.  V:  Facial sensation is intact in the V1-V3 distribution bilaterally.  VII:  Face is symmetric with normal eye closure and smile  VIII:  Hearing is intact to finger rub.  IX, X:  Uvula is midline and soft palate rises symmetrically  XI:  Head turning and shoulder shrug are intact.  XII:  Tongue protrudes in the midline.  - Motor:  Strength is 5/5 throughout.  There is no pronator drift.  Normal muscle bulk and tone throughout.  - Reflexes:  2+ and symmetric at the biceps, triceps, brachioradialis, knees, and ankles.  Plantar responses flexor.  - Sensory:  Intact to light touch, pin prick, vibration, and joint-position sense throughout.  - Coordination:  Finger-nose-finger and heel-knee-shin intact without dysmetria.  Rapid alternating hand movements intact.  - Gait:   Normal steps, base, arm swing, and turning.  Heel and toe walking are normal.  Tandem gait is normal.  Romberg testing is negative.    LABS:                        8.6    6.90  )-----------( 266      ( 07 May 2019 06:52 )             30.3     05-07    141  |  96  |  13  ----------------------------<  125<H>  4.7   |  36<H>  |  0.40<L>    Ca    9.5      07 May 2019 06:52  Phos  3.3     05-07  Mg     2.2     05-07    TPro  7.1  /  Alb  3.0<L>  /  TBili  <0.2  /  DBili  x   /  AST  12  /  ALT  15  /  AlkPhos  95  05-07          RADIOLOGY & ADDITIONAL STUDIES:      IMPRESSION & RECOMMENDATIONS: NEUROLOGY INITIAL CONSULT NOTE    CHIEF COMPLAINT:  LE weakness, need for IVIG     HPI:  58yo F PMHx polio, breast cancer, DM, Endometrial Cancer s/p exploratory laparotomy, enterolysis, SHAMIR, BSO, pelvic lymphadenectomy, low anterior resection mobilization of splenic flexure, end colostomy on 7/30/18 , rectovaginal fistula, numerous admissions for urinary tract infection presents on referral from [facility] with urosepsis. ICU consulted for respiratory failure with worsening mental status change and unresponsiveness. Patient has extensive UTI/Pyelonephritis history, was being treated with Ertapenem as outpatient, on admission was seen by ID who recommended meropenem and Vancomycin. Rapid response called on day of admission for tachycardia > 130, acute mental status change, decreased responsiveness. Patient intubated while in ER holding. Pt was weaned off levophed and was extubated on 5/2. Surveillance blood cultures were negative. Both urine and initial blood cx growing ESBL e coli sensitive to meropenem. Patient transitioned to ertapenem. Surveillance cultures have since finalized and are negative. Patient stepped down to GYN and stepped up to Medicine on 5/7 in the setting pulmonary congestion. 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    MEDICATIONS  (STANDING):  dextrose 5%. 1000 milliLiter(s) (50 mL/Hr) IV Continuous <Continuous>  dextrose 50% Injectable 25 Gram(s) IV Push once  enoxaparin Injectable 40 milliGRAM(s) SubCutaneous every 24 hours  ertapenem  IVPB 1000 milliGRAM(s) IV Intermittent every 24 hours  insulin lispro (HumaLOG) corrective regimen sliding scale   SubCutaneous Before meals and at bedtime  metFORMIN 1000 milliGRAM(s) Oral two times a day  pantoprazole   Suspension 40 milliGRAM(s) Oral daily  petrolatum Ophthalmic Ointment 1 Application(s) Both EYES three times a day  tamoxifen 20 milliGRAM(s) Oral two times a day  zinc oxide 20% Ointment 1 Application(s) Topical three times a day    MEDICATIONS  (PRN):  acetaminophen   Tablet .. 650 milliGRAM(s) Oral every 6 hours PRN Temp greater or equal to 38C (100.4F), Mild Pain (1 - 3)  glucagon  Injectable 1 milliGRAM(s) IntraMuscular once PRN Glucose LESS THAN 70 milligrams/deciliter  ondansetron Injectable 8 milliGRAM(s) IV Push every 8 hours PRN Nausea and/or Vomiting      Allergies    No Known Allergies    Intolerances        FAMILY HISTORY:      SOCIAL HISTORY:  Living Situation:  Occupation:  Tobacco:  Alcohol:   Drug use:      VITAL SIGNS:  Vital Signs Last 24 Hrs  T(C): 36.8 (07 May 2019 13:00), Max: 37.6 (07 May 2019 05:02)  T(F): 98.3 (07 May 2019 13:00), Max: 99.7 (07 May 2019 05:02)  HR: 98 (07 May 2019 11:15) (78 - 98)  BP: 113/57 (07 May 2019 11:15) (102/55 - 113/59)  BP(mean): --  RR: 16 (07 May 2019 11:15) (16 - 18)  SpO2: 99% (07 May 2019 11:15) (97% - 100%)    PHYSICAL EXAMINATION:  Constitutional: Frail middle age female  Eyes: Conjunctiva and sclera clear.  Neurologic:  - Mental Status:  AAOx3; hypophonic   - Cranial Nerves II-XII:    II: Pupils are equal, round, and reactive to light.  III, IV, VI:  Extraocular movements are intact without nystagmus.  V:  Facial sensation is intact in the V1-V3 distribution bilaterally.  VII:  Face is symmetric with normal eye closure and smile  VIII:  Hearing is intact to finger rub.  IX, X:  Uvula is midline and soft palate rises symmetrically  XI:  Head turning and shoulder shrug are intact.  XII:  Tongue protrudes in the midline.  - Motor:  Hypotrophic muscles. Upper extremities Deltoid 3, Biceps 3, Triceps 3, Lower extremities Hip 2, Knee 2, Ankle 1   - Reflexes:  0/5 reflexes at biceps, triceps, brachioradialis, knees, and ankles. No babinski response.   - Sensory:  Intact to light touch, decreased to vibration and joint position   - Coordination:  Finger-nose-finger without dysmetria.   - Gait: Deferred       LABS:                        8.6    6.90  )-----------( 266      ( 07 May 2019 06:52 )             30.3     05-07    141  |  96  |  13  ----------------------------<  125<H>  4.7   |  36<H>  |  0.40<L>    Ca    9.5      07 May 2019 06:52  Phos  3.3     05-07  Mg     2.2     05-07    TPro  7.1  /  Alb  3.0<L>  /  TBili  <0.2  /  DBili  x   /  AST  12  /  ALT  15  /  AlkPhos  95  05-07

## 2019-05-07 NOTE — PROGRESS NOTE ADULT - PROBLEM SELECTOR PLAN 9
Support provided to patient and family. Patient to have access to supportive services during rest of hospital stay as the patient/family deemed necessary ie. Chaplaincy, Massage therapy, Music therapy, Patient and family supportive services, Palliative SW, etc. as identified during the patients PSSA screening the patient would benefit from: continued visits from Patient and Family Support counselor and massage therapy.

## 2019-05-07 NOTE — PROGRESS NOTE ADULT - PROBLEM SELECTOR PLAN 2
stage IV endometrial adenocarcinoma; per GYN-ONC w/ interval increase in tumor burden  - management per GYN-ONC  - not currently on chemotherapy secondary to performance status and active infection  - patient states that her "cancer is gone, that they removed it all at [facility]"  may have poor understanding of her situation. After discussion w/ gyn-onc today, we gently introduced the idea to the patient that cancer can return and that this is likely what has happened to her. Will continue to have ongoing conversation about this with her.

## 2019-05-07 NOTE — PROGRESS NOTE ADULT - SUBJECTIVE AND OBJECTIVE BOX
ICU consult progress note    59 F w/ Stage IV Endometrial Carcinoma s/p staging surgery 7/2018 and 1 cycle of chemo in 2/2019, DM, low anterior resection mobilization of splenic flexure, end colostomy w/ rectovaginal fistula w/ numerous admissions for urinary tract infection and bacteremia presents on referral from [facility] with urosepsis. ICU consulted for respiratory failure with worsening mental status change and unresponsiveness. Patient has extensive UTI/Pyelonephritis history, was being treated with Ertapenem as outpatient, on admission was seen by ID who recommended Meropenam and Vancomycin. Rapid response called on day of admission for tachycardia > 130, acute mental status change, decreased responsiveness. Patient intubated while in ER holding. patient was weaned off levophed and was extubated on 5/2. Surveillance blood cultures were negative. Both urine and initial blood cx growing ESBL e coli sensitive to meropenem. Patient transitioned to ertapenem. Surveillance cultures have since finalized and are negative. Patient has exhibited critical illness myopathy and this AM had 7 beats of ventricular tachycardia. Her exam is significant for: DMM, decreased breath sounds at bilateral lung bases, tachycardia and 1+ pitting edema b/l to the knees. She has capillary refill times < 2 seconds with symmetric 1+ peripheral pulses. No significant electrolyte abnormalities.     Patient to be transferred to [facility] for further management under Dr. Gutierrez.   - will obtain echo, chest xray and give 2 mg of magnesium.

## 2019-05-07 NOTE — PROGRESS NOTE ADULT - SUBJECTIVE AND OBJECTIVE BOX
Interval Events: Reviewed  Patient seen and examined at bedside.    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  Pleural effusion  Muscle weakness: generalized  Malignant neoplasm: endometrium  History of total abdominal hysterectomy and bilateral salpingo-oophorectomy: with resection of endometrial mass, low anterior resection and pelvic lymphadenectomy      MEDICATIONS:  Pulmonary:    Antimicrobials:  ertapenem  IVPB 1000 milliGRAM(s) IV Intermittent every 24 hours    Anticoagulants:  enoxaparin Injectable 40 milliGRAM(s) SubCutaneous every 24 hours    Cardiac:      Allergies    No Known Allergies    Intolerances        Vital Signs Last 24 Hrs  T(C): 37.3 (07 May 2019 08:00), Max: 37.6 (07 May 2019 05:02)  T(F): 99.2 (07 May 2019 08:00), Max: 99.7 (07 May 2019 05:02)  HR: 78 (07 May 2019 08:00) (78 - 104)  BP: 108/61 (07 May 2019 08:00) (101/69 - 113/59)  BP(mean): --  RR: 17 (07 May 2019 08:00) (16 - 18)  SpO2: 100% (07 May 2019 08:00) (97% - 100%)    05-06 @ 07:01  -  05-07 @ 07:00  --------------------------------------------------------  IN: 480 mL / OUT: 975 mL / NET: -495 mL    05-07 @ 07:01  -  05-07 @ 08:26  --------------------------------------------------------  IN: 0 mL / OUT: 400 mL / NET: -400 mL          LABS:      CBC Full  -  ( 07 May 2019 06:52 )  WBC Count : 6.90 K/uL  RBC Count : 2.96 M/uL  Hemoglobin : 8.6 g/dL  Hematocrit : 30.3 %  Platelet Count - Automated : 266 K/uL  Mean Cell Volume : 102.4 fl  Mean Cell Hemoglobin : 29.1 pg  Mean Cell Hemoglobin Concentration : 28.4 gm/dL  Auto Neutrophil # : x  Auto Lymphocyte # : x  Auto Monocyte # : x  Auto Eosinophil # : x  Auto Basophil # : x  Auto Neutrophil % : x  Auto Lymphocyte % : x  Auto Monocyte % : x  Auto Eosinophil % : x  Auto Basophil % : x    05-07    141  |  96  |  13  ----------------------------<  125<H>  4.7   |  36<H>  |  0.40<L>    Ca    9.5      07 May 2019 06:52  Phos  3.3     05-07  Mg     2.2     05-07    TPro  7.1  /  Alb  3.0<L>  /  TBili  <0.2  /  DBili  x   /  AST  12  /  ALT  15  /  AlkPhos  95  05-07                        RADIOLOGY & ADDITIONAL STUDIES (The following images were personally reviewed):  Westbrook:                                     No  Urine output:                       adequate  DVT prophylaxis:                 Yes  Flattus:                                  Yes  Bowel movement:              No

## 2019-05-07 NOTE — PROGRESS NOTE ADULT - ASSESSMENT
60yo F HD9 with stage IV endometrial adenocarcinoma s/p staging surgery '18 and 1 round of chemotherapy 2/19 readmitted from [facility] with fever, previously admitted for management of symptomatic rectovaginal and enterovaginal fistulas. Presents on referral from [facility] with urosepsis.  Rapid response called due to worsening respiratory status and mental status, pt intubated and transferred to MICU. stabilized, extubated transferred to GYN regional.  Pending Neuro, Medicine (Matt), and Urology Recommendations.    1. Neuro: extubated. CIDP s/p IVIG, next due on 5/9, also post polio contributing to weakness.   Neurology reconsulted. (Dr. Sellers)   2. Pulm: 2L NC, mild pulmonary edema, stable, encourage IS  3. Cardio: HD stable; had a run of vtach this AM  4. FEN/GI: tolerating reg diet.  5. : joseph in place. suspect urosepsis. consult urology.  6. ID: IV ertapenem until 5/14, s/p meropenem, s/p vanc  7. Endocrine: FS, ISS, Metformin 1000mg BID  8. VTE prophylaxis - SCDs, Lovenox 40 daily   9. GYN malignancy  -stopped anastrazole and initiated 3 weeks of megace 80mg BID followed by 3 weeks of tamoxifen. started megace and metformin 4/10, now on Tamoxifen for 21 days (5/1/19-  10. Derm: monitor sacrum for s/s of pressure ulcer. encourage frequent changes in position and OOB during  the day  11. social work/dispo planning as patient will go back to [facility].   **TRANSFER TO MEDICINE

## 2019-05-07 NOTE — CONSULT NOTE ADULT - ATTENDING COMMENTS
58yo F PMHx polio, breast cancer, DM, Endometrial Cancer s/p exploratory laparotomy, readmitted with urosepsis, diagnosed with CIDP in april treated with IVIG.  Pt reports doing better following discharge and rehab, was able to walk with a walker, until a few days prior to admission she developed decline in gait.      Progression of weakness prior to original diagnosis is unclear. Reportedly started using a walker in June 2018, after having surgery for her cancer.  Acutely prior to last admission she became substantially weaker.  Likely CIDP, although given complex medical issues, unclear when the demyelinating neuropathy really started.  also has hx of polio with EMG evidence of post polio syndrome as well.    CIDP is unlikely to cause respiratory issues. Although her current level of weakness is likely multifactorial, worsening of her neuropathy is also a possibility therefore agree with treatment, 500mg/kg daily IVIG for 2 days, anti A depleted formulation.  Additional doses to be determined by repsonse
60yo female with complex medical and surgical history as described above. Now admitted with indwelling Westbrook, UTI with sepsis, h/o colovaginal fistula, urinary retention. Reviewed labs, CT imaging. No recent trial of void. Recommend trial of void now. Will need urodynamics as outpatient. May benefit from IR guided SPT if she fails TOV to reduce potential seeding from colovaginal fistula.
I have reviewed the medical record, including laboratory and radiographic studies, examined the patient and discussed the plan with Dr. Hernández, the ID Resident.  Agree with above. Will continue to follow with you – ID Team 1.
Patient was given intermittently diuretics for fluid overload. The patient had fluid resuscitation for a septic shock and most likely has increased ability permeability with hypoalbuminemia secondary to her sepsis and malnutrition. Previously the patient was NPO and TP and you to the fistula. Observe and diuretics as needed basis  Patient seen and examined with house-staff during bedside rounds.  Resident note read, including vitals, physical findings, laboratory data, and radiological reports.   Revisions included below.  Direct personal management at bed side and extensive interpretation of the data.  Plan was outlined and discussed in details with the housestaff.  Decision making of high complexity  Action taken for acute disease activity to reflect the level of care provided:  - medication reconciliation  - review laboratory data

## 2019-05-07 NOTE — PROGRESS NOTE ADULT - PROBLEM SELECTOR PLAN 1
resolved and she is to continue on the antibiotics.  I discussed with Gyn and she might need suppressive therapy.  I will discuss with ID

## 2019-05-07 NOTE — PROGRESS NOTE ADULT - ASSESSMENT
58yo F HD9 with stage IV endometrial adenocarcinoma s/p staging surgery '18 and 1 round of chemotherapy 2/19 readmitted from [facility] with fever, previously admitted for management of symptomatic rectovaginal and enterovaginal fistulas. Presents on referral from [facility] with urosepsis.  Rapid response called due to worsening respiratory status s/p intubation and MICU admission. Pt has been transferred to medicine team today on telemetry.     1. Neuro: extubated. CIDP s/p IVIG. post polio contributing to weakness. Plan for IVIG today. Follow up neuro recs   2. Pulm: 2L NC, Last Chest x-ray reveals L>R vascular congestion. Follow up today's chest x-ray. Encourage ISS at least 10 times per hour. Pt to get IV lasix   3. Cardio: HD stable; had a run of vtach this AM- follow up echocardiogram   4. FEN/GI: tolerating Reg diet   5. : s/p joseph catheter- awaiting TOV   6. ID: IV ertapenem until 5/14, s/p meropenem, s/p vanc  7. Endocrine: FS, ISS, Metformin 1000mg BID  8. VTE prophylaxis - SCDs, Lovenox 40mg daily   9. GYN malignancy  -stopped anastrazole and initiated 3 weeks of megace 80mg BID followed by 3 weeks of tamoxifen. started megace and metformin 4/10, now on Tamoxifen for 21 days (5/1/19-  10. Derm: monitor sacrum for s/s of pressure ulcer. Encourage frequent changes in position and OOB during  the day  11. Social work/dispo planning as patient will go back to [facility]

## 2019-05-07 NOTE — PROGRESS NOTE ADULT - SUBJECTIVE AND OBJECTIVE BOX
Pt seen and examined at bedside. She states feeling overall weak. She has not got out of bed to chair today. Pt states she had some lunch today and tolerated. Pt has not voided yet.   Pt denies fever, chills, chest pain, SOB, nausea, vomiting, lightheadedness, or dizziness.      T(F): 100 (05-07-19 @ 17:03), Max: 100 (05-07-19 @ 17:03)  HR: 104 (05-07-19 @ 16:50) (78 - 104)  BP: 116/65 (05-07-19 @ 16:50) (102/55 - 116/65)  RR: 16 (05-07-19 @ 16:50) (16 - 18)  SpO2: 100% (05-07-19 @ 16:50) (97% - 100%)  Wt(kg): --  I&O's Summary    06 May 2019 07:01  -  07 May 2019 07:00  --------------------------------------------------------  IN: 480 mL / OUT: 975 mL / NET: -495 mL    07 May 2019 07:01  -  07 May 2019 17:13  --------------------------------------------------------  IN: 50 mL / OUT: 500 mL / NET: -450 mL    MEDICATIONS  (STANDING):  acetaminophen   Tablet .. 650 milliGRAM(s) Oral once  dextrose 5%. 1000 milliLiter(s) (50 mL/Hr) IV Continuous <Continuous>  dextrose 50% Injectable 25 Gram(s) IV Push once  diphenhydrAMINE 25 milliGRAM(s) Oral once  enoxaparin Injectable 40 milliGRAM(s) SubCutaneous every 24 hours  ertapenem  IVPB 1000 milliGRAM(s) IV Intermittent every 24 hours  insulin lispro (HumaLOG) corrective regimen sliding scale   SubCutaneous Before meals and at bedtime  metFORMIN 1000 milliGRAM(s) Oral two times a day  pantoprazole   Suspension 40 milliGRAM(s) Oral daily  petrolatum Ophthalmic Ointment 1 Application(s) Both EYES three times a day  tamoxifen 20 milliGRAM(s) Oral two times a day  zinc oxide 20% Ointment 1 Application(s) Topical three times a day    MEDICATIONS  (PRN):  acetaminophen   Tablet .. 650 milliGRAM(s) Oral every 6 hours PRN Temp greater or equal to 38C (100.4F), Mild Pain (1 - 3)  furosemide   Injectable 40 milliGRAM(s) IV Push once PRN after IVIG  glucagon  Injectable 1 milliGRAM(s) IntraMuscular once PRN Glucose LESS THAN 70 milligrams/deciliter  ondansetron Injectable 8 milliGRAM(s) IV Push every 8 hours PRN Nausea and/or Vomiting    Physical Exam:  Gen: No Acute Distress  Pulm: Cta b/l, decreased breath sounds left lung base  GI: soft, nontender, nondistended, no rebound, no guarding, ostomy with stool output  Ext: SCDs in place, no calf tenderness/edema     LABS:                        8.6    6.90  )-----------( 266      ( 07 May 2019 06:52 )             30.3     05-07    141  |  96  |  13  ----------------------------<  125<H>  4.7   |  36<H>  |  0.40<L>    Ca    9.5      07 May 2019 06:52  Phos  3.3     05-07  Mg     2.2     05-07    TPro  7.1  /  Alb  3.0<L>  /  TBili  <0.2  /  DBili  x   /  AST  12  /  ALT  15  /  AlkPhos  95  05-07

## 2019-05-07 NOTE — PROGRESS NOTE ADULT - PROBLEM SELECTOR PLAN 2
CIDP suspected per discussion w/ neurology based on EMG data. s/p IVIG with [employer] (due to concern for Pt's blood type and AIHA) last admission.  Now with marked weakness, affecting respiratory status neuro reconsulted for IVIG   -Starting IVIG 500mg/kg on 5/7  -Neuro recs apprecaited

## 2019-05-07 NOTE — PROGRESS NOTE ADULT - PROBLEM SELECTOR PLAN 6
tage IV endometrial adenocarcinoma; per GYN-ONC w/ interval increase in tumor burden stage IV endometrial adenocarcinoma; per GYN-ONC w/ interval increase in tumor burden  -Care per Gyn team appreciated

## 2019-05-07 NOTE — PROGRESS NOTE ADULT - ASSESSMENT
54 year old female with known stage IV endometrial cancer, likely with progression of disease, complex fistulae, chronic indwelling Westbrook admitted with fever and urosepsis - now s/p step-down from ICU to med-surg under gynecology service for further management. 54 year old female with known stage IV endometrial cancer, likely with progression of disease, complex fistulae, chronic indwelling [place]brook admitted with fever and urosepsis -Transferred from GYN to Medicine under Dr. Gutierrez with further input from neurology

## 2019-05-07 NOTE — PROGRESS NOTE ADULT - ASSESSMENT
60yo F PMHx polio, breast cancer, DM, Endometrial Cancer s/p exploratory laparotomy, enterolysis, SHAMIR, BSO, pelvic lymphadenectomy, low anterior resection mobilization of splenic flexure, end colostomy on 7/30/18, rectovaginal fistula, numerous admissions for urinary tract infection presented to [facility] in the setting of urosepsis. Hospital course complicated by blood stream infection (currently on ertapenem) and respiratory failure requiring intubation s/p extubation on 5/2. After beats of Vtac on tele transferred to [facility] for medical management of weakness and multiple comorbidities.

## 2019-05-08 DIAGNOSIS — J96.02 ACUTE RESPIRATORY FAILURE WITH HYPERCAPNIA: ICD-10-CM

## 2019-05-08 DIAGNOSIS — A41.51 SEPSIS DUE TO ESCHERICHIA COLI [E. COLI]: ICD-10-CM

## 2019-05-08 DIAGNOSIS — J96.00 ACUTE RESPIRATORY FAILURE, UNSPECIFIED WHETHER WITH HYPOXIA OR HYPERCAPNIA: ICD-10-CM

## 2019-05-08 DIAGNOSIS — Z71.89 OTHER SPECIFIED COUNSELING: ICD-10-CM

## 2019-05-08 DIAGNOSIS — Z91.89 OTHER SPECIFIED PERSONAL RISK FACTORS, NOT ELSEWHERE CLASSIFIED: ICD-10-CM

## 2019-05-08 LAB
ALBUMIN SERPL ELPH-MCNC: 3.2 G/DL — LOW (ref 3.3–5)
ALP SERPL-CCNC: 86 U/L — SIGNIFICANT CHANGE UP (ref 40–120)
ALT FLD-CCNC: 13 U/L — SIGNIFICANT CHANGE UP (ref 10–45)
ANION GAP SERPL CALC-SCNC: 12 MMOL/L — SIGNIFICANT CHANGE UP (ref 5–17)
APPEARANCE CSF: CLEAR — SIGNIFICANT CHANGE UP
APPEARANCE CSF: CLEAR — SIGNIFICANT CHANGE UP
APPEARANCE SPUN FLD: COLORLESS — SIGNIFICANT CHANGE UP
APPEARANCE SPUN FLD: COLORLESS — SIGNIFICANT CHANGE UP
APTT BLD: 30.5 SEC — SIGNIFICANT CHANGE UP (ref 27.5–36.3)
AST SERPL-CCNC: 13 U/L — SIGNIFICANT CHANGE UP (ref 10–40)
BASE EXCESS BLDA CALC-SCNC: 16.4 MMOL/L — HIGH (ref -2–3)
BASE EXCESS BLDA CALC-SCNC: 17.9 MMOL/L — HIGH (ref -2–3)
BASOPHILS # BLD AUTO: 0.01 K/UL — SIGNIFICANT CHANGE UP (ref 0–0.2)
BASOPHILS NFR BLD AUTO: 0.1 % — SIGNIFICANT CHANGE UP (ref 0–2)
BILIRUB SERPL-MCNC: 0.2 MG/DL — SIGNIFICANT CHANGE UP (ref 0.2–1.2)
BLD GP AB SCN SERPL QL: NEGATIVE — SIGNIFICANT CHANGE UP
BUN SERPL-MCNC: 19 MG/DL — SIGNIFICANT CHANGE UP (ref 7–23)
CALCIUM SERPL-MCNC: 9.7 MG/DL — SIGNIFICANT CHANGE UP (ref 8.4–10.5)
CHLORIDE SERPL-SCNC: 90 MMOL/L — LOW (ref 96–108)
CO2 SERPL-SCNC: 38 MMOL/L — HIGH (ref 22–31)
COLOR CSF: SIGNIFICANT CHANGE UP
COLOR CSF: SIGNIFICANT CHANGE UP
CREAT SERPL-MCNC: 0.43 MG/DL — LOW (ref 0.5–1.3)
CSF COMMENTS: SIGNIFICANT CHANGE UP
CSF COMMENTS: SIGNIFICANT CHANGE UP
CSF PCR RESULT: SIGNIFICANT CHANGE UP
EOSINOPHIL # BLD AUTO: 0.01 K/UL — SIGNIFICANT CHANGE UP (ref 0–0.5)
EOSINOPHIL NFR BLD AUTO: 0.1 % — SIGNIFICANT CHANGE UP (ref 0–6)
GAS PNL BLDA: SIGNIFICANT CHANGE UP
GAS PNL BLDA: SIGNIFICANT CHANGE UP
GLUCOSE BLDC GLUCOMTR-MCNC: 110 MG/DL — HIGH (ref 70–99)
GLUCOSE BLDC GLUCOMTR-MCNC: 118 MG/DL — HIGH (ref 70–99)
GLUCOSE BLDC GLUCOMTR-MCNC: 129 MG/DL — HIGH (ref 70–99)
GLUCOSE BLDC GLUCOMTR-MCNC: 135 MG/DL — HIGH (ref 70–99)
GLUCOSE CSF-MCNC: 74 MG/DL — HIGH (ref 40–70)
GLUCOSE SERPL-MCNC: 109 MG/DL — HIGH (ref 70–99)
GRAM STN FLD: SIGNIFICANT CHANGE UP
HCO3 BLDA-SCNC: 39 MMOL/L — HIGH (ref 21–28)
HCO3 BLDA-SCNC: 42 MMOL/L — HIGH (ref 21–28)
HCO3 BLDA-SCNC: SIGNIFICANT CHANGE UP MMOL/L (ref 21–28)
HCT VFR BLD CALC: 28.6 % — LOW (ref 34.5–45)
HGB BLD-MCNC: 8.4 G/DL — LOW (ref 11.5–15.5)
IMM GRANULOCYTES NFR BLD AUTO: 0.8 % — SIGNIFICANT CHANGE UP (ref 0–1.5)
INR BLD: 1.12 — SIGNIFICANT CHANGE UP (ref 0.88–1.16)
LACTATE SERPL-SCNC: 1.2 MMOL/L — SIGNIFICANT CHANGE UP (ref 0.5–2)
LYMPHOCYTES # BLD AUTO: 1.97 K/UL — SIGNIFICANT CHANGE UP (ref 1–3.3)
LYMPHOCYTES # BLD AUTO: 25 % — SIGNIFICANT CHANGE UP (ref 13–44)
LYMPHOCYTES # CSF: 2 % — LOW (ref 40–80)
LYMPHOCYTES # CSF: 3 % — LOW (ref 40–80)
MCHC RBC-ENTMCNC: 29.1 PG — SIGNIFICANT CHANGE UP (ref 27–34)
MCHC RBC-ENTMCNC: 29.4 GM/DL — LOW (ref 32–36)
MCV RBC AUTO: 99 FL — SIGNIFICANT CHANGE UP (ref 80–100)
MONOCYTES # BLD AUTO: 0.43 K/UL — SIGNIFICANT CHANGE UP (ref 0–0.9)
MONOCYTES NFR BLD AUTO: 5.4 % — SIGNIFICANT CHANGE UP (ref 2–14)
MONOS+MACROS NFR CSF: 1 % — LOW (ref 15–45)
MONOS+MACROS NFR CSF: 2 % — LOW (ref 15–45)
NEUTROPHILS # BLD AUTO: 5.41 K/UL — SIGNIFICANT CHANGE UP (ref 1.8–7.4)
NEUTROPHILS # CSF: 0 % — SIGNIFICANT CHANGE UP (ref 0–6)
NEUTROPHILS # CSF: 2 % — SIGNIFICANT CHANGE UP (ref 0–6)
NEUTROPHILS NFR BLD AUTO: 68.6 % — SIGNIFICANT CHANGE UP (ref 43–77)
NRBC # BLD: 0 /100 WBCS — SIGNIFICANT CHANGE UP (ref 0–0)
NRBC NFR CSF: 4 /UL — SIGNIFICANT CHANGE UP (ref 0–5)
NRBC NFR CSF: 6 /UL — HIGH (ref 0–5)
PCO2 BLDA: 40 MMHG — SIGNIFICANT CHANGE UP (ref 32–45)
PCO2 BLDA: 46 MMHG — HIGH (ref 32–45)
PCO2 BLDA: SIGNIFICANT CHANGE UP MMHG (ref 32–45)
PH BLDA: 7.57 — HIGH (ref 7.35–7.45)
PH BLDA: 7.61 — CRITICAL HIGH (ref 7.35–7.45)
PH BLDA: SIGNIFICANT CHANGE UP (ref 7.35–7.45)
PLATELET # BLD AUTO: 300 K/UL — SIGNIFICANT CHANGE UP (ref 150–400)
PO2 BLDA: 118 MMHG — HIGH (ref 83–108)
PO2 BLDA: 92 MMHG — SIGNIFICANT CHANGE UP (ref 83–108)
PO2 BLDA: SIGNIFICANT CHANGE UP MMHG (ref 83–108)
POTASSIUM SERPL-MCNC: 4.6 MMOL/L — SIGNIFICANT CHANGE UP (ref 3.5–5.3)
POTASSIUM SERPL-SCNC: 4.6 MMOL/L — SIGNIFICANT CHANGE UP (ref 3.5–5.3)
PROT CSF-MCNC: 33 MG/DL — SIGNIFICANT CHANGE UP (ref 15–45)
PROT SERPL-MCNC: 7.8 G/DL — SIGNIFICANT CHANGE UP (ref 6–8.3)
PROTHROM AB SERPL-ACNC: 12.7 SEC — SIGNIFICANT CHANGE UP (ref 10–12.9)
RBC # BLD: 2.89 M/UL — LOW (ref 3.8–5.2)
RBC # CSF: 21 /UL — HIGH (ref 0–0)
RBC # CSF: 40 /UL — HIGH (ref 0–0)
RBC # FLD: 14.5 % — SIGNIFICANT CHANGE UP (ref 10.3–14.5)
RH IG SCN BLD-IMP: POSITIVE — SIGNIFICANT CHANGE UP
SAO2 % BLDA: 98 % — SIGNIFICANT CHANGE UP (ref 95–100)
SAO2 % BLDA: 99 % — SIGNIFICANT CHANGE UP (ref 95–100)
SAO2 % BLDA: SIGNIFICANT CHANGE UP % (ref 95–100)
SODIUM SERPL-SCNC: 140 MMOL/L — SIGNIFICANT CHANGE UP (ref 135–145)
SPECIMEN SOURCE: SIGNIFICANT CHANGE UP
TUBE TYPE: SIGNIFICANT CHANGE UP
TUBE TYPE: SIGNIFICANT CHANGE UP
WBC # BLD: 7.89 K/UL — SIGNIFICANT CHANGE UP (ref 3.8–10.5)
WBC # FLD AUTO: 7.89 K/UL — SIGNIFICANT CHANGE UP (ref 3.8–10.5)

## 2019-05-08 PROCEDURE — 99497 ADVNCD CARE PLAN 30 MIN: CPT | Mod: 25

## 2019-05-08 PROCEDURE — 99291 CRITICAL CARE FIRST HOUR: CPT

## 2019-05-08 PROCEDURE — 99233 SBSQ HOSP IP/OBS HIGH 50: CPT | Mod: GC,25

## 2019-05-08 PROCEDURE — 99233 SBSQ HOSP IP/OBS HIGH 50: CPT | Mod: GC

## 2019-05-08 PROCEDURE — 93306 TTE W/DOPPLER COMPLETE: CPT | Mod: 26

## 2019-05-08 PROCEDURE — 31500 INSERT EMERGENCY AIRWAY: CPT | Mod: GC

## 2019-05-08 PROCEDURE — 99223 1ST HOSP IP/OBS HIGH 75: CPT

## 2019-05-08 PROCEDURE — 71045 X-RAY EXAM CHEST 1 VIEW: CPT | Mod: 26,76

## 2019-05-08 RX ORDER — CHLORHEXIDINE GLUCONATE 213 G/1000ML
1 SOLUTION TOPICAL
Qty: 0 | Refills: 0 | Status: DISCONTINUED | OUTPATIENT
Start: 2019-05-08 | End: 2019-05-23

## 2019-05-08 RX ORDER — CHLORHEXIDINE GLUCONATE 213 G/1000ML
1 SOLUTION TOPICAL
Qty: 0 | Refills: 0 | Status: DISCONTINUED | OUTPATIENT
Start: 2019-05-08 | End: 2019-05-08

## 2019-05-08 RX ORDER — NYSTATIN CREAM 100000 [USP'U]/G
1 CREAM TOPICAL THREE TIMES A DAY
Qty: 0 | Refills: 0 | Status: DISCONTINUED | OUTPATIENT
Start: 2019-05-08 | End: 2019-05-21

## 2019-05-08 RX ORDER — PROPOFOL 10 MG/ML
0.5 INJECTION, EMULSION INTRAVENOUS
Qty: 500 | Refills: 0 | Status: DISCONTINUED | OUTPATIENT
Start: 2019-05-08 | End: 2019-05-08

## 2019-05-08 RX ORDER — CHLORHEXIDINE GLUCONATE 213 G/1000ML
15 SOLUTION TOPICAL EVERY 12 HOURS
Qty: 0 | Refills: 0 | Status: DISCONTINUED | OUTPATIENT
Start: 2019-05-08 | End: 2019-05-10

## 2019-05-08 RX ORDER — MIDAZOLAM HYDROCHLORIDE 1 MG/ML
6 INJECTION, SOLUTION INTRAMUSCULAR; INTRAVENOUS ONCE
Qty: 0 | Refills: 0 | Status: DISCONTINUED | OUTPATIENT
Start: 2019-05-08 | End: 2019-05-08

## 2019-05-08 RX ORDER — IMMUNE GLOBULIN,GAMMA(IGG) 5 %
30 VIAL (ML) INTRAVENOUS ONCE
Qty: 0 | Refills: 0 | Status: COMPLETED | OUTPATIENT
Start: 2019-05-08 | End: 2019-05-08

## 2019-05-08 RX ORDER — ACETAZOLAMIDE 250 MG/1
250 TABLET ORAL ONCE
Qty: 0 | Refills: 0 | Status: COMPLETED | OUTPATIENT
Start: 2019-05-08 | End: 2019-05-08

## 2019-05-08 RX ORDER — DIPHENHYDRAMINE HCL 50 MG
25 CAPSULE ORAL ONCE
Qty: 0 | Refills: 0 | Status: COMPLETED | OUTPATIENT
Start: 2019-05-08 | End: 2019-05-08

## 2019-05-08 RX ORDER — SODIUM CHLORIDE 9 MG/ML
500 INJECTION INTRAMUSCULAR; INTRAVENOUS; SUBCUTANEOUS ONCE
Qty: 0 | Refills: 0 | Status: COMPLETED | OUTPATIENT
Start: 2019-05-08 | End: 2019-05-08

## 2019-05-08 RX ORDER — PROPOFOL 10 MG/ML
5 INJECTION, EMULSION INTRAVENOUS
Qty: 1000 | Refills: 0 | Status: DISCONTINUED | OUTPATIENT
Start: 2019-05-08 | End: 2019-05-09

## 2019-05-08 RX ORDER — NOREPINEPHRINE BITARTRATE/D5W 8 MG/250ML
0.05 PLASTIC BAG, INJECTION (ML) INTRAVENOUS
Qty: 8 | Refills: 0 | Status: DISCONTINUED | OUTPATIENT
Start: 2019-05-08 | End: 2019-05-09

## 2019-05-08 RX ORDER — ACETAMINOPHEN 500 MG
650 TABLET ORAL ONCE
Qty: 0 | Refills: 0 | Status: COMPLETED | OUTPATIENT
Start: 2019-05-08 | End: 2019-05-08

## 2019-05-08 RX ADMIN — Medication 1 APPLICATION(S): at 06:19

## 2019-05-08 RX ADMIN — ACETAZOLAMIDE 105 MILLIGRAM(S): 250 TABLET ORAL at 23:05

## 2019-05-08 RX ADMIN — MIDAZOLAM HYDROCHLORIDE 6 MILLIGRAM(S): 1 INJECTION, SOLUTION INTRAMUSCULAR; INTRAVENOUS at 13:13

## 2019-05-08 RX ADMIN — ZINC OXIDE 1 APPLICATION(S): 200 OINTMENT TOPICAL at 06:18

## 2019-05-08 RX ADMIN — TAMOXIFEN CITRATE 20 MILLIGRAM(S): 20 TABLET, FILM COATED ORAL at 18:22

## 2019-05-08 RX ADMIN — Medication 1 APPLICATION(S): at 21:28

## 2019-05-08 RX ADMIN — ENOXAPARIN SODIUM 40 MILLIGRAM(S): 100 INJECTION SUBCUTANEOUS at 00:32

## 2019-05-08 RX ADMIN — Medication 50 GRAM(S): at 19:20

## 2019-05-08 RX ADMIN — Medication 650 MILLIGRAM(S): at 18:35

## 2019-05-08 RX ADMIN — Medication 25 MILLIGRAM(S): at 18:35

## 2019-05-08 RX ADMIN — CHLORHEXIDINE GLUCONATE 15 MILLILITER(S): 213 SOLUTION TOPICAL at 18:21

## 2019-05-08 RX ADMIN — Medication 1 APPLICATION(S): at 18:22

## 2019-05-08 RX ADMIN — Medication 650 MILLIGRAM(S): at 19:30

## 2019-05-08 RX ADMIN — NYSTATIN CREAM 1 APPLICATION(S): 100000 CREAM TOPICAL at 21:45

## 2019-05-08 RX ADMIN — ERTAPENEM SODIUM 120 MILLIGRAM(S): 1 INJECTION, POWDER, LYOPHILIZED, FOR SOLUTION INTRAMUSCULAR; INTRAVENOUS at 21:27

## 2019-05-08 RX ADMIN — Medication 40 MILLIGRAM(S): at 00:32

## 2019-05-08 RX ADMIN — ZINC OXIDE 1 APPLICATION(S): 200 OINTMENT TOPICAL at 18:22

## 2019-05-08 RX ADMIN — ZINC OXIDE 1 APPLICATION(S): 200 OINTMENT TOPICAL at 21:27

## 2019-05-08 RX ADMIN — SODIUM CHLORIDE 1000 MILLILITER(S): 9 INJECTION INTRAMUSCULAR; INTRAVENOUS; SUBCUTANEOUS at 23:05

## 2019-05-08 NOTE — PROGRESS NOTE ADULT - PROBLEM SELECTOR PLAN 5
#Neurogenic bladder.    -requiring indwelling joseph catheter with multiple recent urologic infections w/ drug resistant organisms  - ABx therapy as above in problem #1  - appreciate urologic management/recommendations as above. Stage IV endometrial adenocarcinoma; per GYN-ONC w/ interval increase in tumor burden. Pt was treated with Carbotaxiel 4/20/19, s/p Megace and recently started on Tamoxifen. Stage IV endometrial adenocarcinoma; per GYN-ONC w/ interval increase in tumor burden. Pt was treated with Anastrazole and initiated 3 weeks of megace 80mg BID followed by 3 weeks of tamoxifen. started megace and metformin 4/10, now on Tamoxifen for 21 days (5/1/19-)

## 2019-05-08 NOTE — PROGRESS NOTE ADULT - ATTENDING COMMENTS
Patient seen and examined with house-staff during bedside rounds.  Resident note read, including vitals, physical findings, laboratory data, and radiological reports.   Revisions included below.  Direct personal management at bed side and extensive interpretation of the data.  Plan was outlined and discussed in details with the housestaff.  Decision making of high complexity  Action taken for acute disease activity to reflect the level of care provided:  - medication reconciliation  - review laboratory data  I discussed the case in details with the neurology and resident.  I discussed with ICUThe patient neurological status is deteriorating. Patient was unable to do vital capacity and negative inspiratory force. Patient received her first dose of immunoglobulin. I discussed the case with neurology at this point we'll continue IgG for a max 3 doses prior to plasmapheresis. Patient minute intubated. On Lipitor dose. The condition is not related to fluid overload is most like related to her neurological status. A left message for her to sister 3 times.  She was seen several times

## 2019-05-08 NOTE — PROGRESS NOTE ADULT - PROBLEM SELECTOR PLAN 10
In addition to E and M visit documented above an Advance care planning meeting was conducted by Dr. Alaniz with patients sister and Dr. Aguilar  Start time: 1:00 PM  End time:  1:30PM  Total time: 30 minutes    A face to face meeting to discuss advance care planning was held today regarding: TIM MCDANIEL  Primary decision maker: sister Esha  Alternate/surrogate:  Discussed advance directives including, but not limited to, healthcare proxy and code status.  Discussed with sister that patient was very ill, that the team was worried about her ability to recover.  Discussed possible etiologies and Neurolgy input regarding IVIG.  DIscussed patients desire to accept her situation and "go home to die"  But sister also feels patient would want her to make best decisions possible and has always turned to her for advice  At this point sister wishes a trial of treatment and efforts to improve Tim's situation.  She does not feel patient would want long term ventilation/dependence on life support.    Decision regarding code status: Patient remains full code  Documentation completed today: none

## 2019-05-08 NOTE — PROGRESS NOTE ADULT - PROBLEM SELECTOR PLAN 3
rectovaginal and enterovaginal fistulas developed secondary to problem #2 and staging surgery in Fall 2018  -  evaluated and discussed option of supra-pubic catheter that may decrease, but not eliminate her risk/propensity for recurrent urinary infections. Westbrook since removed; awaiting further mgmt/recs on this matter stage IV endometrial adenocarcinoma; per GYN-ONC w/ interval increase in tumor burden  - management per GYN-ONC  - not currently on chemotherapy secondary to performance status and active infection  - patient states that her "cancer is gone, that they removed it all at [facility]"  may have poor understanding of her situation. After discussion w/ gyn-onc today, we gently introduced the idea to the patient that cancer can return and that this is likely what has happened to her. Will continue to have ongoing conversation about this with her.    Although patient has persistent and enlarging tumor- tumor burden is not great, but does require treatment

## 2019-05-08 NOTE — PROGRESS NOTE ADULT - PROBLEM SELECTOR PLAN 2
stage IV endometrial adenocarcinoma; per GYN-ONC w/ interval increase in tumor burden  - management per GYN-ONC  - not currently on chemotherapy secondary to performance status and active infection  - patient states that her "cancer is gone, that they removed it all at [facility]"  may have poor understanding of her situation. After discussion w/ gyn-onc today, we gently introduced the idea to the patient that cancer can return and that this is likely what has happened to her. Will continue to have ongoing conversation about this with her. Originally presented w/ septic shock requiring pressor support and intubation -- now resolved w/ continued treatment for ESBL P. mirabilis in urine and blood  - continue antimicrobial therapy  - monitor vital signs.

## 2019-05-08 NOTE — PROGRESS NOTE ADULT - ASSESSMENT
60yo F PMHx polio, breast cancer, DM, Endometrial Cancer s/p exploratory laparotomy, enterolysis, SHAMIR, BSO, pelvic lymphadenectomy, low anterior resection mobilization of splenic flexure, end colostomy on 7/30/18, rectovaginal fistula, numerous admissions for urinary tract infection presented to [facility] in the setting of urosepsis. Hospital course complicated by blood stream infection (currently on ertapenem) and respiratory failure requiring intubation s/p extubation on 5/2. After beats of Vtac on tele transferred to [facility] for medical management of weakness and multiple comorbidities. 58yo F PMHx polio, breast cancer, DM, Endometrial Cancer s/p exploratory laparotomy, enterolysis, SHAMIR, BSO, pelvic lymphadenectomy, low anterior resection mobilization of splenic flexure, end colostomy on 7/30/18, rectovaginal fistula, numerous admissions for urinary tract infection presented to [facility] in the setting of urosepsis. Hospital course complicated by blood stream infection (currently on ertapenem) and respiratory failure requiring intubation s/p extubation on 5/2. Now with worsening respiratory status due to likely neurological process (CIPD vs   vs paraneoplastic vs AIDP?) intubated for CO2 narcosis and stepped up to MICU.

## 2019-05-08 NOTE — PROGRESS NOTE ADULT - PROBLEM SELECTOR PLAN 8
F: none   E: Replete K>4, MG <2 PRN   N: dysphagia diet   DVT: SCD, lovenox   Dispo: MICU   Code Status: FULL CODE

## 2019-05-08 NOTE — PROGRESS NOTE ADULT - ASSESSMENT
54 year old female with known stage IV endometrial cancer, likely with progression of disease, complex fistulae, chronic indwelling Westbrook admitted with fever and urosepsis -Transferred from GYN to Medicine under Dr. Gutierrez with further input from neurology

## 2019-05-08 NOTE — PROGRESS NOTE ADULT - PROBLEM SELECTOR PLAN 1
Originally presented w/ septic shock requiring pressor support and intubation -- now resolved w/ continued treatment for ESBL P. mirabilis in urine and blood  - continue antimicrobial therapy  - monitor vital signs. Patient with acute respiratory failure related to muscle weakness- see differential of progressive weakness below.    Patient with severe hypercapnea this am and is presently being intubated

## 2019-05-08 NOTE — PROGRESS NOTE ADULT - SUBJECTIVE AND OBJECTIVE BOX
TIM MCDANIEL             [id number]    CC: weakness, GOC    HPI:  58 yo G0 w/ known stage IV endometrial carcinoma s/p staging surgery 7/2018 and 1 cycle of chemotherapy in 2/2019 followed by multiple admissions for management of symptomatic enterovaginal fistula, complex fistula associated urinary tract infection, bacteremia presents on referral from [facility] after fever today of 100.7. Pt reports poor PO intake, nausea w/ dry heaving and bringing up phlegm for the past 2 days. She also reports continued weakness. She denies chills, HA, dizziness, CP, palpitations, SOB, abdominal pain, vaginal bleeding, leakage of stool per vagina, abnormal vaginal discharge. She states she was not ambulating daily at [facility] however was working with PT there.    OB/GYN Hx: G0, stage 4 endometrial cancer dx in 7/2018; h/o breast cancer in 2009 s/p chemo and radiation with left breast lumpectomy  PMHx: T2DM, polio in childhood, h/o breast cancer  SHx: left breast lumpectomy, right knee surgery with screw placement 2001, 7/2018 exploratory laparotomy, enterolysis, SHAMIR, BSO, pelvic lymphadenectomy, low anterior resection mobilization of splenic flexure, end colostomy   Allergies: NKDA (29 Apr 2019 23:11)    SUBJECTIVE:  Feeling incredibly weak, markedly worse than yesterday. Breathing is harder and can't really move her head. Tearfully describes just wanting to go home to die. Remainder of subjective deferred due to imminent respiratory compromise.     ROS:  DYSPNEA: Y  NAUS/VOM: N	  SECRETIONS: N	  AGITATION: N  Pain (Y/N): N  -Provocation/Palliation:  -Quality/Quantity:  -Radiating:  -Severity:  -Timing/Frequency:  -Impact on ADLs:    OTHER REVIEW OF SYSTEMS:  UNABLE TO OBTAIN  due to: pending resp compromise    PEx:  T(C): 35.6 (05-08-19 @ 09:25), Max: 37.8 (05-07-19 @ 17:03)  HR: 86 (05-08-19 @ 14:00) (86 - 116)  BP: 114/73 (05-08-19 @ 14:00) (93/43 - 140/74)  RR: 20 (05-08-19 @ 14:00) (16 - 24)  SpO2: 100% (05-08-19 @ 14:00) (92% - 100%)  Wt(kg): --    General: frail female resting in bed appears older than stated age, unable to lift head up off bed  HEENT: moderate alopecia; conjunctival pallor; nasal cannula in place; hypophonic speech, moist mucous membranes  Neck: supple  CVS: S1/S2, RRR  Resp: on nasal cannula, speaks short sentences but not in distress or using accessory muscles,   GI: soft, non-tender  : joseph removed  Musc: hypotrophic muscularity and bulk, particularly of UE/hands, LE; profound kyphoscoliosis muscle wasting of left trapezius and latissimus dorsi  Neuro: awake and alert  Psych: flat, depressed affect; pleasant  Skin: intact  	     ALLERGIES: IVIG PRODUCT IS PRIGIVEN (Unknown)  No Known Allergies      OPIATE NAÏVE (Y/N):    MEDICATIONS: REVIEWED  MEDICATIONS  (STANDING):  acetaminophen   Tablet .. 650 milliGRAM(s) Oral once  dextrose 5%. 1000 milliLiter(s) (50 mL/Hr) IV Continuous <Continuous>  dextrose 50% Injectable 25 Gram(s) IV Push once  enoxaparin Injectable 40 milliGRAM(s) SubCutaneous every 24 hours  ertapenem  IVPB 1000 milliGRAM(s) IV Intermittent every 24 hours  insulin lispro (HumaLOG) corrective regimen sliding scale   SubCutaneous Before meals and at bedtime  pantoprazole   Suspension 40 milliGRAM(s) Oral daily  petrolatum Ophthalmic Ointment 1 Application(s) Both EYES three times a day  propofol Infusion 5 MICROgram(s)/kG/Min (1.749 mL/Hr) IV Continuous <Continuous>  tamoxifen 20 milliGRAM(s) Oral two times a day  zinc oxide 20% Ointment 1 Application(s) Topical three times a day    MEDICATIONS  (PRN):  acetaminophen   Tablet .. 650 milliGRAM(s) Oral every 6 hours PRN Temp greater or equal to 38C (100.4F), Mild Pain (1 - 3)  glucagon  Injectable 1 milliGRAM(s) IntraMuscular once PRN Glucose LESS THAN 70 milligrams/deciliter  ondansetron Injectable 8 milliGRAM(s) IV Push every 8 hours PRN Nausea and/or Vomiting      LABS: REVIEWED  CBC:                        8.4    7.89  )-----------( 300      ( 08 May 2019 13:58 )             28.6     CMP:    05-08    140  |  90<L>  |  19  ----------------------------<  109<H>  4.6   |  38<H>  |  0.43<L>    Ca    9.7      08 May 2019 13:58  Phos  3.3     05-07  Mg     2.2     05-07    TPro  7.8  /  Alb  3.2<L>  /  TBili  0.2  /  DBili  x   /  AST  13  /  ALT  13  /  AlkPhos  86  05-08      IMAGING: REVIEWED    ADVANCED DIRECTIVES: FULL CODE    DECISION MAKER:   LEGAL SURROGATE:    PSYCHOSOCIAL-SPIRITUAL ASSESSMENT:       Reviewed       Care plan unchanged       Care plan adjusted as above	    GOALS OF CARE DISCUSSION       Palliative care info/counseling provided	           Family meeting       Advanced Directives addressed please see Advance Care Planning Note	           See previous Palliative Medicine Note       Documentation of GOC:   	      AGENCY CHOICE DISCUSSED:           Homecare        Hospice        [facility]        [facility]        Other:    REFERRALS	        Palliative Med        Unit SW/Case Mgmt              Speech/Swallow       Patient/Family Support       Massage Therapy       Music Therapy       Hospice       Nutrition       PT/OT TIM MCDANIEL             [id number]    CC: weakness, GOC    HPI:  60 yo G0 w/ known stage IV endometrial carcinoma s/p staging surgery 7/2018 and 1 cycle of chemotherapy in 2/2019 followed by multiple admissions for management of symptomatic enterovaginal fistula, complex fistula associated urinary tract infection, bacteremia presents on referral from [facility] after fever today of 100.7. Pt reports poor PO intake, nausea w/ dry heaving and bringing up phlegm for the past 2 days. She also reports continued weakness. She denies chills, HA, dizziness, CP, palpitations, SOB, abdominal pain, vaginal bleeding, leakage of stool per vagina, abnormal vaginal discharge. She states she was not ambulating daily at [facility] however was working with PT there.    OB/GYN Hx: G0, stage 4 endometrial cancer dx in 7/2018; h/o breast cancer in 2009 s/p chemo and radiation with left breast lumpectomy  PMHx: T2DM, polio in childhood, h/o breast cancer  SHx: left breast lumpectomy, right knee surgery with screw placement 2001, 7/2018 exploratory laparotomy, enterolysis, SHAMIR, BSO, pelvic lymphadenectomy, low anterior resection mobilization of splenic flexure, end colostomy   Allergies: NKDA (29 Apr 2019 23:11)    SUBJECTIVE:  Feeling incredibly weak, markedly worse than yesterday. Breathing is harder and can't really move her head. Tearfully describes just wanting to go home to die. Remainder of subjective deferred due to imminent respiratory compromise.     ROS:  DYSPNEA: Y  NAUS/VOM: N	  SECRETIONS: N	  AGITATION: N  Pain (Y/N): N  -Provocation/Palliation:  -Quality/Quantity:  -Radiating:  -Severity:  -Timing/Frequency:  -Impact on ADLs:    OTHER REVIEW OF SYSTEMS:  UNABLE TO OBTAIN  due to: pending resp compromise    PEx:  T(C): 35.6 (05-08-19 @ 09:25), Max: 37.8 (05-07-19 @ 17:03)  HR: 86 (05-08-19 @ 14:00) (86 - 116)  BP: 114/73 (05-08-19 @ 14:00) (93/43 - 140/74)  RR: 20 (05-08-19 @ 14:00) (16 - 24)  SpO2: 100% (05-08-19 @ 14:00) (92% - 100%)  Wt(kg): --    General: frail female resting in bed appears older than stated age, unable to lift head up off bed  HEENT: moderate alopecia; conjunctival pallor; hypophonic speech, moist mucous membranes  Neck: supple  CVS: S1/S2, RRR  Resp: hypoventilating on HFNC, speaking short sentences  GI: soft, non-tender  Musc: hypotrophic muscularity and bulk, particularly of UE/hands, LE; profound kyphoscoliosis muscle wasting of left trapezius and latissimus dorsi  Neuro: awake and alert  Psych: flat, depressed affect; pleasant  Skin: intact  	     ALLERGIES: IVIG PRODUCT IS PRIGIVEN (Unknown)  No Known Allergies    OPIATE NAÏVE (Y/N): N    MEDICATIONS: REVIEWED  MEDICATIONS  (STANDING):  acetaminophen   Tablet .. 650 milliGRAM(s) Oral once  dextrose 5%. 1000 milliLiter(s) (50 mL/Hr) IV Continuous <Continuous>  dextrose 50% Injectable 25 Gram(s) IV Push once  enoxaparin Injectable 40 milliGRAM(s) SubCutaneous every 24 hours  ertapenem  IVPB 1000 milliGRAM(s) IV Intermittent every 24 hours  insulin lispro (HumaLOG) corrective regimen sliding scale   SubCutaneous Before meals and at bedtime  pantoprazole   Suspension 40 milliGRAM(s) Oral daily  petrolatum Ophthalmic Ointment 1 Application(s) Both EYES three times a day  propofol Infusion 5 MICROgram(s)/kG/Min (1.749 mL/Hr) IV Continuous <Continuous>  tamoxifen 20 milliGRAM(s) Oral two times a day  zinc oxide 20% Ointment 1 Application(s) Topical three times a day    MEDICATIONS  (PRN):  acetaminophen   Tablet .. 650 milliGRAM(s) Oral every 6 hours PRN Temp greater or equal to 38C (100.4F), Mild Pain (1 - 3)  glucagon  Injectable 1 milliGRAM(s) IntraMuscular once PRN Glucose LESS THAN 70 milligrams/deciliter  ondansetron Injectable 8 milliGRAM(s) IV Push every 8 hours PRN Nausea and/or Vomiting      LABS: REVIEWED  CBC:                        8.4    7.89  )-----------( 300      ( 08 May 2019 13:58 )             28.6     CMP:    05-08    140  |  90<L>  |  19  ----------------------------<  109<H>  4.6   |  38<H>  |  0.43<L>    Ca    9.7      08 May 2019 13:58  Phos  3.3     05-07  Mg     2.2     05-07    TPro  7.8  /  Alb  3.2<L>  /  TBili  0.2  /  DBili  x   /  AST  13  /  ALT  13  /  AlkPhos  86  05-08      IMAGING: REVIEWED    ADVANCED DIRECTIVES: FULL CODE    DECISION MAKER: self  LEGAL SURROGATE: Esha Avila (sister) [phone]    PSYCHOSOCIAL-SPIRITUAL ASSESSMENT:       Reviewed       Care plan unchanged    GOALS OF CARE DISCUSSION       Palliative care info/counseling provided	           Family meeting       Advanced Directives addressed please see Advance Care Planning Note	           See previous Palliative Medicine Note       Documentation of GOC:   	      AGENCY CHOICE DISCUSSED:     Deferred today    REFERRALS	        Palliative Med        Unit SW/Case Mgmt        - declined       Speech/Swallow       Patient/Family Support       Massage Therapy       Music Therapy       Hospice       Nutrition       PT/OT TIM MCDANIEL             [id number]    CC: weakness, GO    HPI:  60 yo G0 w/ known stage IV endometrial carcinoma s/p staging surgery 7/2018 and 1 cycle of chemotherapy in 2/2019 followed by multiple admissions for management of symptomatic enterovaginal fistula, complex fistula associated urinary tract infection, bacteremia presents on referral from [facility] after fever today of 100.7. Pt reports poor PO intake, nausea w/ dry heaving and bringing up phlegm for the past 2 days. She also reports continued weakness. She denies chills, HA, dizziness, CP, palpitations, SOB, abdominal pain, vaginal bleeding, leakage of stool per vagina, abnormal vaginal discharge. She states she was not ambulating daily at [facility] however was working with PT there.    OB/GYN Hx: G0, stage 4 endometrial cancer dx in 7/2018; h/o breast cancer in 2009 s/p chemo and radiation with left breast lumpectomy  PMHx: T2DM, polio in childhood, h/o breast cancer  SHx: left breast lumpectomy, right knee surgery with screw placement 2001, 7/2018 exploratory laparotomy, enterolysis, SHAMIR, BSO, pelvic lymphadenectomy, low anterior resection mobilization of splenic flexure, end colostomy   Allergies: NKDA (29 Apr 2019 23:11)    SUBJECTIVE:  Feeling incredibly weak, markedly worse than yesterday. Breathing is harder and can't really move her head. Tearfully describes just wanting to go home to die. Remainder of subjective deferred due to imminent respiratory compromise.  On high flow nasal cannula.    ROS:  DYSPNEA: Y  NAUS/VOM: N	  SECRETIONS: N	  AGITATION: N  Pain (Y/N): N  -Provocation/Palliation:  -Quality/Quantity:  -Radiating:  -Severity:  -Timing/Frequency:  -Impact on ADLs:    OTHER REVIEW OF SYSTEMS:  UNABLE TO OBTAIN  due to: pending resp compromise    PEx:  T(C): 35.6 (05-08-19 @ 09:25), Max: 37.8 (05-07-19 @ 17:03)  HR: 86 (05-08-19 @ 14:00) (86 - 116)  BP: 114/73 (05-08-19 @ 14:00) (93/43 - 140/74)  RR: 20 (05-08-19 @ 14:00) (16 - 24)  SpO2: 100% (05-08-19 @ 14:00) (92% - 100%)  Wt(kg): --    General: frail female resting in bed appears older than stated age, unable to lift head up off bed pale  HEENT: moderate alopecia; conjunctival pallor; hypophonic speech, moist mucous membranes  Neck: supple  CVS: S1/S2, RRR  Resp: hypoventilating on HFNC, speaking short sentences  GI: soft, non-tender  Musc: hypotrophic muscularity and bulk, particularly of UE/hands, LE; profound kyphoscoliosis muscle wasting of left trapezius and latissimus dorsi  Neuro: awake and alert  Psych: flat, depressed affect; pleasant  Skin: intact  	     ALLERGIES: IVIG PRODUCT IS PRIGIVEN (Unknown)  No Known Allergies    OPIATE NAÏVE (Y/N): N    MEDICATIONS: REVIEWED  MEDICATIONS  (STANDING):  acetaminophen   Tablet .. 650 milliGRAM(s) Oral once  dextrose 5%. 1000 milliLiter(s) (50 mL/Hr) IV Continuous <Continuous>  dextrose 50% Injectable 25 Gram(s) IV Push once  enoxaparin Injectable 40 milliGRAM(s) SubCutaneous every 24 hours  ertapenem  IVPB 1000 milliGRAM(s) IV Intermittent every 24 hours  insulin lispro (HumaLOG) corrective regimen sliding scale   SubCutaneous Before meals and at bedtime  pantoprazole   Suspension 40 milliGRAM(s) Oral daily  petrolatum Ophthalmic Ointment 1 Application(s) Both EYES three times a day  propofol Infusion 5 MICROgram(s)/kG/Min (1.749 mL/Hr) IV Continuous <Continuous>  tamoxifen 20 milliGRAM(s) Oral two times a day  zinc oxide 20% Ointment 1 Application(s) Topical three times a day    MEDICATIONS  (PRN):  acetaminophen   Tablet .. 650 milliGRAM(s) Oral every 6 hours PRN Temp greater or equal to 38C (100.4F), Mild Pain (1 - 3)  glucagon  Injectable 1 milliGRAM(s) IntraMuscular once PRN Glucose LESS THAN 70 milligrams/deciliter  ondansetron Injectable 8 milliGRAM(s) IV Push every 8 hours PRN Nausea and/or Vomiting      LABS: REVIEWED  CBC:                        8.4    7.89  )-----------( 300      ( 08 May 2019 13:58 )             28.6     CMP:    05-08    140  |  90<L>  |  19  ----------------------------<  109<H>  4.6   |  38<H>  |  0.43<L>    Ca    9.7      08 May 2019 13:58  Phos  3.3     05-07  Mg     2.2     05-07    TPro  7.8  /  Alb  3.2<L>  /  TBili  0.2  /  DBili  x   /  AST  13  /  ALT  13  /  AlkPhos  86  05-08      IMAGING: REVIEWED    ADVANCED DIRECTIVES: FULL CODE    DECISION MAKER: self  LEGAL SURROGATE: Esha Avila (sister) [phone]    PSYCHOSOCIAL-SPIRITUAL ASSESSMENT:       Reviewed       Care plan unchanged    GOALS OF CARE DISCUSSION       Palliative care info/counseling provided	           Family meeting       Advanced Directives addressed please see Advance Care Planning Note below	           See previous Palliative Medicine Note       Documentation of GOC: see advance care planning not below  	      AGENCY CHOICE DISCUSSED:     Deferred today    REFERRALS	        Palliative Med        Unit SW/Case Mgmt        - declined       Speech/Swallow       Patient/Family Support       Massage Therapy       Music Therapy       Hospice       Nutrition       PT/OT

## 2019-05-08 NOTE — PROGRESS NOTE ADULT - ASSESSMENT
60yo F HD10 with stage IV endometrial adenocarcinoma s/p staging surgery '18 and 1 round of chemotherapy 2/19 readmitted from [facility] with fever, previously admitted for management of symptomatic rectovaginal and enterovaginal fistulas. Presents on referral from [facility] with urosepsis.  Rapid response called due to worsening respiratory status s/p intubation and MICU admission. Pt has been transferred to medicine team on telemetry.   clinical condition worsened overnight with patient having increased work of breathing requiring high flow NC.    1. Neuro: extubated. CIDP s/p IVIG. post polio contributing to weakness. 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 Plan for IVIG 2 days. Follow up neuro recs   2. Pulm: high flow NC, Follow up today's chest x-ray. respiratory status worsening, primary team notified, they are monitoring and will escalate if needed  3. Cardio: HD stable  4. FEN/GI: ordered for Reg diet but no appetite this AM  5. : s/p joseph catheter- passed TOV, getting bladder scans, still incontinent, will monitor to determine if it is overflow incontinence, primafit on   6. ID: IV ertapenem until 5/14, s/p meropenem, s/p vanc  7. Endocrine: FS, ISS, Metformin 1000mg BID  8. VTE prophylaxis - SCDs, Lovenox 40mg daily   9. GYN malignancy  -stopped anastrazole and initiated 3 weeks of megace 80mg BID followed by 3 weeks of tamoxifen. started megace and metformin 4/10, now on Tamoxifen for 21 days (5/1/19-   *pt refusing pills occasionally, not going to be given this AM given clinical condition  10. Derm: monitor sacrum for s/s of pressure ulcer. Encourage frequent changes in position and OOB during  the day  11. Social work/palliative: spoke with palliative care team this AM. plan to have a meeting with gyn onc, palliative care and patient's family tomorrow around 3pm, sooner if needed. code status to be addressed. long term plan to be addressed.

## 2019-05-08 NOTE — PROGRESS NOTE ADULT - PROBLEM SELECTOR PLAN 6
stage IV endometrial adenocarcinoma; per GYN-ONC w/ interval increase in tumor burden  -Care per Gyn team appreciated Pt had rectovaginal (resolving?) and enterovaginal fistulas   -  evaluated and discussed option of supra-pubic catheter that may decrease, but not eliminate her risk/propensity for recurrent urinary infections. [place] since removed; awaiting further mgmt/recs on this matter. Pt had rectovaginal and enterovaginal fistulas s/p colostomy.

## 2019-05-08 NOTE — PROGRESS NOTE ADULT - SUBJECTIVE AND OBJECTIVE BOX
NEUROLOGY CONSULT PROGRESS NOTE    INTERVAL HISTORY:  Patient w/ worsening respiratory distress overnight and now requiring HFNC in the AM during pre-rounds and rounds. More lethargic appearing.     REVIEW OF SYSTEMS:  Shortness of breath and generalized weakness     MEDICATIONS:  acetaminophen   Tablet .. 650 milliGRAM(s) Oral every 6 hours PRN  acetaminophen   Tablet .. 650 milliGRAM(s) Oral once  dextrose 5%. 1000 milliLiter(s) IV Continuous <Continuous>  dextrose 50% Injectable 25 Gram(s) IV Push once  enoxaparin Injectable 40 milliGRAM(s) SubCutaneous every 24 hours  ertapenem  IVPB 1000 milliGRAM(s) IV Intermittent every 24 hours  glucagon  Injectable 1 milliGRAM(s) IntraMuscular once PRN  insulin lispro (HumaLOG) corrective regimen sliding scale   SubCutaneous Before meals and at bedtime  ondansetron Injectable 8 milliGRAM(s) IV Push every 8 hours PRN  pantoprazole   Suspension 40 milliGRAM(s) Oral daily  petrolatum Ophthalmic Ointment 1 Application(s) Both EYES three times a day  propofol Infusion 5 MICROgram(s)/kG/Min IV Continuous <Continuous>  tamoxifen 20 milliGRAM(s) Oral two times a day  zinc oxide 20% Ointment 1 Application(s) Topical three times a day    VITAL SIGNS:  Vital Signs Last 24 Hrs  T(C): 35.6 (08 May 2019 09:25), Max: 37.8 (07 May 2019 17:03)  T(F): 96.1 (08 May 2019 09:25), Max: 100 (07 May 2019 17:03)  HR: 86 (08 May 2019 14:00) (86 - 116)  BP: 114/73 (08 May 2019 14:00) (93/43 - 140/74)  BP(mean): 101 (08 May 2019 14:00) (48 - 106)  RR: 20 (08 May 2019 14:00) (16 - 24)  SpO2: 100% (08 May 2019 14:00) (92% - 100%)    PHYSICAL EXAMINATION:  Constitutional: Frail middle age female  Eyes: Conjunctiva and sclera clear.  Neurologic:  - Mental Status:  AAOx3; hypophonic   - Cranial Nerves II-XII:    II: Pupils are equal, round, and reactive to light.  III, IV, VI:  Able to fully move eyes laterally and limited medially   V:  Facial sensation is intact  VII:  Weak facial muscles but with normal eye closure   VIII:  Hearing is intact to finger rub.  XI:  Weak shoulder shrug, unable to turn head w/o assistance   - Motor:  Flaccid muscles. Hypotrophic muscles. Upper extremities R arm 3+/5, R  4/5, L arm 2+/5 Lower extremities Leg 1/5, Ankle 0/5  - Reflexes:  0/5 reflexes at biceps, triceps, brachioradialis, knees, and ankles. No babinski response.   - Sensory:  Intact to light touch, No vibration and joint position in the feet  - Gait: Deferred       LABS:                          8.4    7.89  )-----------( 300      ( 08 May 2019 13:58 )             28.6     05-08    140  |  90<L>  |  19  ----------------------------<  109<H>  4.6   |  38<H>  |  0.43<L>    Ca    9.7      08 May 2019 13:58  Phos  3.3     05-07  Mg     2.2     05-07    TPro  7.8  /  Alb  3.2<L>  /  TBili  0.2  /  DBili  x   /  AST  13  /  ALT  13  /  AlkPhos  86  05-08  PT/INR - ( 08 May 2019 13:57 )   PT: 12.7 sec;   INR: 1.12     PTT - ( 08 May 2019 13:57 )  PTT:30.5 sec

## 2019-05-08 NOTE — PROGRESS NOTE ADULT - PROBLEM SELECTOR PLAN 5
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 complicated by problem #2 above; requiring indwelling joseph catheter with multiple recent urologic infections w/ drug resistant organisms  - ABx therapy as above in problem #1  - appreciate urologic management/recommendations as above

## 2019-05-08 NOTE — PROGRESS NOTE ADULT - PROBLEM SELECTOR PLAN 8
Continued providing counseling and support to patient today.  - Discussed events w/ patient's sister HCP at bedside in ICU and while on [facility]. She understands how the patient is very ill and is tired -- Esha herself is overwhelmed with another brother currently sick at another hospital with cholecystitis and that is why she hasn't been here yesterday available to talk to us. She knows the patient has been through a lot and knows her role is "to be strong for" the patient -- she wants to explore treatment for reversible causes and is interested in speaking with neurology team specifically for further information regarding her current problem now that patient is intubated from resp failure as above.  - Case discussed w/ gyn attending as well  - Dr. Gutierrez now managing case on his service/[facility]  - will continue to address [facility] w/ sister Esha and provide support to her and remainder of pts family. As discussed w/ neurology, element of PPS on EMG testing but would not explain her upper extremity weakness and respiratory weakness; would only be complicating her prior LE weakness that she has had longstanding since polio dx  - appreciate neurology mgmt  - mgmt otherwise as above in problem #5 and 6

## 2019-05-08 NOTE — PROGRESS NOTE ADULT - PROBLEM SELECTOR PLAN 4
complicated by problem #2 above; requiring indwelling joseph catheter with multiple recent urologic infections w/ drug resistant organisms  - ABx therapy as above in problem #1  - appreciate urologic management/recommendations as above rectovaginal and enterovaginal fistulas developed secondary to problem #2 and staging surgery in Fall 2018  -  evaluated and discussed option of supra-pubic catheter that may decrease, but not eliminate her risk/propensity for recurrent urinary infections. [place] since removed; awaiting further mgmt/recs on this matter

## 2019-05-08 NOTE — PROGRESS NOTE ADULT - SUBJECTIVE AND OBJECTIVE BOX
INTERVAL HPI/OVERNIGHT EVENTS:  Patient was seen and examined at bedside. As per nurse and patient, no o/n events, patient resting comfortably. No complaints at this time. Patient denies: fever, chills, dizziness, weakness, HA, Changes in vision, CP, palpitations, SOB, cough, N/V/D/C, dysuria, changes in bowel movements, LE edema. ROS otherwise negative.    VITAL SIGNS:  T(F): 96.1 (05-08-19 @ 09:25)  HR: 112 (05-08-19 @ 08:22)  BP: 130/76 (05-08-19 @ 08:22)  RR: 17 (05-08-19 @ 08:22)  SpO2: 100% (05-08-19 @ 08:22)  Wt(kg): --    PHYSICAL EXAM:    Constitutional: WDWN, NAD  HEENT: PERRL, EOMI, sclera non-icteric, neck supple, trachea midline, no masses, no JVD, MMM, good dentition  Respiratory: CTA b/l, good air entry b/l, no wheezing, no rhonchi, no rales, without accessory muscle use and no intercostal retractions  Cardiovascular: RRR, normal S1S2, no M/R/G  Gastrointestinal: soft, NTND, no masses palpable, BS normal  Extremities: Warm, well perfused, pulses equal bilateral upper and lower extremities, no edema, no clubbing  Neurological: AAOx3, CN Grossly intact  Skin: Normal temperature, warm, dry    MEDICATIONS  (STANDING):  acetaminophen   Tablet .. 650 milliGRAM(s) Oral once  dextrose 5%. 1000 milliLiter(s) (50 mL/Hr) IV Continuous <Continuous>  dextrose 50% Injectable 25 Gram(s) IV Push once  enoxaparin Injectable 40 milliGRAM(s) SubCutaneous every 24 hours  ertapenem  IVPB 1000 milliGRAM(s) IV Intermittent every 24 hours  insulin lispro (HumaLOG) corrective regimen sliding scale   SubCutaneous Before meals and at bedtime  metFORMIN 1000 milliGRAM(s) Oral two times a day  pantoprazole   Suspension 40 milliGRAM(s) Oral daily  petrolatum Ophthalmic Ointment 1 Application(s) Both EYES three times a day  tamoxifen 20 milliGRAM(s) Oral two times a day  zinc oxide 20% Ointment 1 Application(s) Topical three times a day    MEDICATIONS  (PRN):  acetaminophen   Tablet .. 650 milliGRAM(s) Oral every 6 hours PRN Temp greater or equal to 38C (100.4F), Mild Pain (1 - 3)  glucagon  Injectable 1 milliGRAM(s) IntraMuscular once PRN Glucose LESS THAN 70 milligrams/deciliter  ondansetron Injectable 8 milliGRAM(s) IV Push every 8 hours PRN Nausea and/or Vomiting      Allergies    No Known Allergies    Intolerances    IVIG PRODUCT IS PRIGIVEN (Unknown)      LABS:                        8.6    6.90  )-----------( 266      ( 07 May 2019 06:52 )             30.3     05-07    141  |  96  |  13  ----------------------------<  125<H>  4.7   |  36<H>  |  0.40<L>    Ca    9.5      07 May 2019 06:52  Phos  3.3     05-07  Mg     2.2     05-07    TPro  7.1  /  Alb  3.0<L>  /  TBili  <0.2  /  DBili  x   /  AST  12  /  ALT  15  /  AlkPhos  95  05-07          RADIOLOGY & ADDITIONAL TESTS:  Reviewed Transfer Note: [facility] to [facility]   Hospital Course   59 F w/ Stage IV Endometrial Carcinoma s/p staging surgery 7/2018 and 1 cycle of chemo in 2/2019, DM, CIPD s/p IVIG with Privagen last admission, low anterior resection mobilization of splenic flexure, end colostomy, rectovaginal fistula w/ numerous admissions for urinary tract infection and bacteremia presents on referral from [facility] with urosepsis (was being treated with Meropenem at [facility]) and intubated in ED for airway protection and admitted to MICU. Patient was weaned off levophed and was extubated on 5/2 then transferred to GYN service. Surveillance blood cultures were negative. Both urine and initial blood cx growing ESBL e coli sensitive to meropenem and Patient transitioned to ertapenem. Pt then transferred to [facility] for concern for worsening neuro exam, started on one dose of Privigen.     INTERVAL HPI/   Patient was seen and examined at bedside. As per nurse and patient, no o/n events, patient resting comfortably. No complaints at this time. Patient denies: fever, chills, dizziness, weakness, HA, Changes in vision, CP, palpitations, SOB, cough, N/V/D/C, dysuria, changes in bowel movements, LE edema. ROS otherwise negative.    VITAL SIGNS:  T(F): 96.1 (05-08-19 @ 09:25)  HR: 112 (05-08-19 @ 08:22)  BP: 130/76 (05-08-19 @ 08:22)  RR: 17 (05-08-19 @ 08:22)  SpO2: 100% (05-08-19 @ 08:22)  Wt(kg): --    PHYSICAL EXAM:    Constitutional: WDWN, NAD  HEENT: PERRL, EOMI, sclera non-icteric, neck supple, trachea midline, no masses, no JVD, MMM, good dentition  Respiratory: CTA b/l, good air entry b/l, no wheezing, no rhonchi, no rales, without accessory muscle use and no intercostal retractions  Cardiovascular: RRR, normal S1S2, no M/R/G  Gastrointestinal: soft, NTND, no masses palpable, BS normal  Extremities: Warm, well perfused, pulses equal bilateral upper and lower extremities, no edema, no clubbing  Neurological: AAOx3, CN Grossly intact  Skin: Normal temperature, warm, dry    MEDICATIONS  (STANDING):  acetaminophen   Tablet .. 650 milliGRAM(s) Oral once  dextrose 5%. 1000 milliLiter(s) (50 mL/Hr) IV Continuous <Continuous>  dextrose 50% Injectable 25 Gram(s) IV Push once  enoxaparin Injectable 40 milliGRAM(s) SubCutaneous every 24 hours  ertapenem  IVPB 1000 milliGRAM(s) IV Intermittent every 24 hours  insulin lispro (HumaLOG) corrective regimen sliding scale   SubCutaneous Before meals and at bedtime  metFORMIN 1000 milliGRAM(s) Oral two times a day  pantoprazole   Suspension 40 milliGRAM(s) Oral daily  petrolatum Ophthalmic Ointment 1 Application(s) Both EYES three times a day  tamoxifen 20 milliGRAM(s) Oral two times a day  zinc oxide 20% Ointment 1 Application(s) Topical three times a day    MEDICATIONS  (PRN):  acetaminophen   Tablet .. 650 milliGRAM(s) Oral every 6 hours PRN Temp greater or equal to 38C (100.4F), Mild Pain (1 - 3)  glucagon  Injectable 1 milliGRAM(s) IntraMuscular once PRN Glucose LESS THAN 70 milligrams/deciliter  ondansetron Injectable 8 milliGRAM(s) IV Push every 8 hours PRN Nausea and/or Vomiting      Allergies    No Known Allergies    Intolerances    IVIG PRODUCT IS PRIGIVEN (Unknown)      LABS:                        8.6    6.90  )-----------( 266      ( 07 May 2019 06:52 )             30.3     05-07    141  |  96  |  13  ----------------------------<  125<H>  4.7   |  36<H>  |  0.40<L>    Ca    9.5      07 May 2019 06:52  Phos  3.3     05-07  Mg     2.2     05-07    TPro  7.1  /  Alb  3.0<L>  /  TBili  <0.2  /  DBili  x   /  AST  12  /  ALT  15  /  AlkPhos  95  05-07          RADIOLOGY & ADDITIONAL TESTS:  Reviewed Transfer Note: [facility] to [facility]   Hospital Course   59 F w/ Stage IV Endometrial Carcinoma s/p staging surgery 7/2018 and 1 cycle of chemo in 2/2019, DM, CIPD s/p IVIG with Privagen last admission, low anterior resection mobilization of splenic flexure, end colostomy, rectovaginal fistula w/ numerous admissions for urinary tract infection and bacteremia presented from [facility] with urosepsis (was being treated with Meropenem at [facility]) and intubated in ED for airway protection and admitted to MICU. Patient was weaned off levophed and was extubated on 5/2 then transferred to GYN service. Surveillance blood cultures were negative. Both urine and initial blood cx growing ESBL e coli sensitive to meropenem and Patient transitioned to ertapenem. Pt then transferred to [facility] for concern for worsening neuro exam, concern for airway protection started on one course of Privigen 5/7. This AM Pt worsening strength exam in neck, could not use straw, could not participate in NIF/FVC stepped up to MICU for closer monitoring.     INTERVAL HPI/   Patient was seen and examined at bedside. O/n has been intermittently refusing Lovenox, refusing to swallow pills due to tiredness.   VITAL SIGNS:  T(F): 96.1 (05-08-19 @ 09:25)  HR: 112 (05-08-19 @ 08:22)  BP: 130/76 (05-08-19 @ 08:22)  RR: 17 (05-08-19 @ 08:22)  SpO2: 100% (05-08-19 @ 08:22)  Wt(kg): --    PHYSICAL EXAM:    Constitutional: WDWN, NAD  HEENT: PERRL, EOMI, sclera non-icteric, neck supple, trachea midline, no masses, no JVD, MMM, good dentition  Respiratory: CTA b/l, good air entry b/l, no wheezing, no rhonchi, no rales, without accessory muscle use and no intercostal retractions  Cardiovascular: RRR, normal S1S2, no M/R/G  Gastrointestinal: soft, NTND, no masses palpable, BS normal  Extremities: Warm, well perfused, pulses equal bilateral upper and lower extremities, no edema, no clubbing  Neurological: AAOx3, CN Grossly intact  Skin: Normal temperature, warm, dry    MEDICATIONS  (STANDING):  acetaminophen   Tablet .. 650 milliGRAM(s) Oral once  dextrose 5%. 1000 milliLiter(s) (50 mL/Hr) IV Continuous <Continuous>  dextrose 50% Injectable 25 Gram(s) IV Push once  enoxaparin Injectable 40 milliGRAM(s) SubCutaneous every 24 hours  ertapenem  IVPB 1000 milliGRAM(s) IV Intermittent every 24 hours  insulin lispro (HumaLOG) corrective regimen sliding scale   SubCutaneous Before meals and at bedtime  metFORMIN 1000 milliGRAM(s) Oral two times a day  pantoprazole   Suspension 40 milliGRAM(s) Oral daily  petrolatum Ophthalmic Ointment 1 Application(s) Both EYES three times a day  tamoxifen 20 milliGRAM(s) Oral two times a day  zinc oxide 20% Ointment 1 Application(s) Topical three times a day    MEDICATIONS  (PRN):  acetaminophen   Tablet .. 650 milliGRAM(s) Oral every 6 hours PRN Temp greater or equal to 38C (100.4F), Mild Pain (1 - 3)  glucagon  Injectable 1 milliGRAM(s) IntraMuscular once PRN Glucose LESS THAN 70 milligrams/deciliter  ondansetron Injectable 8 milliGRAM(s) IV Push every 8 hours PRN Nausea and/or Vomiting      Allergies    No Known Allergies    Intolerances    IVIG PRODUCT IS PRIGIVEN (Unknown)      LABS:                        8.6    6.90  )-----------( 266      ( 07 May 2019 06:52 )             30.3     05-07    141  |  96  |  13  ----------------------------<  125<H>  4.7   |  36<H>  |  0.40<L>    Ca    9.5      07 May 2019 06:52  Phos  3.3     05-07  Mg     2.2     05-07    TPro  7.1  /  Alb  3.0<L>  /  TBili  <0.2  /  DBili  x   /  AST  12  /  ALT  15  /  AlkPhos  95  05-07          RADIOLOGY & ADDITIONAL TESTS:  Reviewed Transfer Note: [facility] to [facility]   Hospital Course   59 F w/ Stage IV Endometrial Carcinoma s/p staging surgery 7/2018 and 1 cycle of chemo in 2/2019, DM, CIPD s/p IVIG with Privagen last admission, low anterior resection mobilization of splenic flexure, end colostomy, rectovaginal fistula w/ numerous admissions for urinary tract infection and bacteremia presented from [facility] with urosepsis (was being treated with Meropenem at [facility]) and intubated in ED for airway protection and admitted to MICU. Patient was weaned off levophed and was extubated on 5/2 then transferred to GYN service. Surveillance blood cultures were negative. Both urine and initial blood cx growing ESBL e coli sensitive to meropenem and Patient transitioned to ertapenem. Pt then transferred to [facility] for concern for worsening neuro exam, concern for airway protection started on one course of Privigen 5/7. This AM Pt more lethargic worsening strength exam in neck, could not use straw, could not participate in NIF/FVC. ABG with pH 7.27, PCO2 105, HCO3 48. Intubated on 7La and stepped up to MICU for CO2 narcosis 2/2 likely respiratory muscle insufficiency.     INTERVAL HPI/   Patient was seen and examined at bedside. O/n has been intermittently refusing Lovenox, refusing to swallow pills due to tiredness.   VITAL SIGNS:  T(F): 96.1 (05-08-19 @ 09:25)  HR: 112 (05-08-19 @ 08:22)  BP: 130/76 (05-08-19 @ 08:22)  RR: 17 (05-08-19 @ 08:22)  SpO2: 100% (05-08-19 @ 08:22)  Wt(kg): --    PHYSICAL EXAM:    Constitutional: WDWN, NAD  HEENT: PERRL, EOMI, sclera non-icteric, neck supple, trachea midline, no masses, no JVD, MMM, good dentition  Respiratory: CTA b/l, good air entry b/l, no wheezing, no rhonchi, no rales, without accessory muscle use and no intercostal retractions  Cardiovascular: RRR, normal S1S2, no M/R/G  Gastrointestinal: soft, NTND, no masses palpable, BS normal  Extremities: Warm, well perfused, pulses equal bilateral upper and lower extremities, no edema, no clubbing  Neurological: AAOx3, CN Grossly intact  Skin: Normal temperature, warm, dry    MEDICATIONS  (STANDING):  acetaminophen   Tablet .. 650 milliGRAM(s) Oral once  dextrose 5%. 1000 milliLiter(s) (50 mL/Hr) IV Continuous <Continuous>  dextrose 50% Injectable 25 Gram(s) IV Push once  enoxaparin Injectable 40 milliGRAM(s) SubCutaneous every 24 hours  ertapenem  IVPB 1000 milliGRAM(s) IV Intermittent every 24 hours  insulin lispro (HumaLOG) corrective regimen sliding scale   SubCutaneous Before meals and at bedtime  metFORMIN 1000 milliGRAM(s) Oral two times a day  pantoprazole   Suspension 40 milliGRAM(s) Oral daily  petrolatum Ophthalmic Ointment 1 Application(s) Both EYES three times a day  tamoxifen 20 milliGRAM(s) Oral two times a day  zinc oxide 20% Ointment 1 Application(s) Topical three times a day    MEDICATIONS  (PRN):  acetaminophen   Tablet .. 650 milliGRAM(s) Oral every 6 hours PRN Temp greater or equal to 38C (100.4F), Mild Pain (1 - 3)  glucagon  Injectable 1 milliGRAM(s) IntraMuscular once PRN Glucose LESS THAN 70 milligrams/deciliter  ondansetron Injectable 8 milliGRAM(s) IV Push every 8 hours PRN Nausea and/or Vomiting      Allergies    No Known Allergies    Intolerances    IVIG PRODUCT IS PRIGIVEN (Unknown)      LABS:                        8.6    6.90  )-----------( 266      ( 07 May 2019 06:52 )             30.3     05-07    141  |  96  |  13  ----------------------------<  125<H>  4.7   |  36<H>  |  0.40<L>    Ca    9.5      07 May 2019 06:52  Phos  3.3     05-07  Mg     2.2     05-07    TPro  7.1  /  Alb  3.0<L>  /  TBili  <0.2  /  DBili  x   /  AST  12  /  ALT  15  /  AlkPhos  95  05-07          RADIOLOGY & ADDITIONAL TESTS:  Reviewed Transfer Note: [facility] to MICU  Hospital Course   59 F w/ post-polio syndrome, CIPD? s/p IVIG with Privagen, Stage IV Endometrial Carcinoma s/p staging surgery 7/2018 and 1 cycle of chemo in 2/2019, s/p exlap, mobilization of splenic flexure, end colostomy, rectovaginal fistula w/ recurrent admissions UTIs/bacteremia, recently admitted for Proteus mirabilis ESBL septicemia in April on Meropenem, presented from [facility] in urosepsis, intubated in ED for airway protection and admitted to MICU. Patient was weaned off levophed and was extubated on 5/2 then transferred to GYN service. Surveillance blood cultures were negative. Both urine and initial blood cx growing ESBL e coli sensitive to meropenem and Patient transitioned to ertapenem. Pt then transferred to [facility] for concern for worsening neuro exam, concern for airway protection started on one course of Privigen 5/7. This AM Pt more lethargic: worsening strength exam in neck, could not use straw, could not participate in NIF/FVC. ABG with pH 7.27, PCO2 105, HCO3 48. Intubated on [facility] and stepped up to MICU for CO2 narcosis 2/2 likely respiratory muscle insufficiency.     INTERVAL HPI/   Patient was seen and examined at bedside. O/n has been intermittently refusing Lovenox, refusing to swallow pills due to tiredness. Stating "I want to go home to die". Denies pain, cough, SoB.   VITAL SIGNS:  T(F): 96.1 (05-08-19 @ 09:25)  HR: 112 (05-08-19 @ 08:22)  BP: 130/76 (05-08-19 @ 08:22)  RR: 17 (05-08-19 @ 08:22)  SpO2: 100% (05-08-19 @ 08:22)  Wt(kg): --    PHYSICAL EXAM:  Constitutional: pale, debilitated appearing, lethargic, slumping over in bed   HEENT: PERRL, EOMI, sclera non-icteric, neck supple, trachea midline, no masses, no JVD, MMM, good dentition; weak neck muscles -3/5 in flexion/extension,   Respiratory: poor inspiratory effort, weak cough, CTA bilateral no wheeze/crackles, decreased breath sounds no tachypnea, no rales, without accessory muscle use and no intercostal retractions  Cardiovascular: RRR, normal S1S2, no M/R/G  Gastrointestinal: midline scar, soft, non-distended, colostomy bag in LLQ with brown stool, pink mucosa, BSx4  RUE: 4/5 bilat, LE: -3/5 poor muscle tone, poor tone in trunk, sluggish H test.   Extremities: Warm, well perfused, pulses equal bilateral upper and lower extremities, 2+ edema up to thighs, no clubbing  Neurological: AAOx2-3, CN Grossly intact  Skin: Normal temperature, warm, dry      MEDICATIONS  (STANDING):  acetaminophen   Tablet .. 650 milliGRAM(s) Oral once  dextrose 5%. 1000 milliLiter(s) (50 mL/Hr) IV Continuous <Continuous>  dextrose 50% Injectable 25 Gram(s) IV Push once  enoxaparin Injectable 40 milliGRAM(s) SubCutaneous every 24 hours  ertapenem  IVPB 1000 milliGRAM(s) IV Intermittent every 24 hours  insulin lispro (HumaLOG) corrective regimen sliding scale   SubCutaneous Before meals and at bedtime  metFORMIN 1000 milliGRAM(s) Oral two times a day  pantoprazole   Suspension 40 milliGRAM(s) Oral daily  petrolatum Ophthalmic Ointment 1 Application(s) Both EYES three times a day  tamoxifen 20 milliGRAM(s) Oral two times a day  zinc oxide 20% Ointment 1 Application(s) Topical three times a day    MEDICATIONS  (PRN):  acetaminophen   Tablet .. 650 milliGRAM(s) Oral every 6 hours PRN Temp greater or equal to 38C (100.4F), Mild Pain (1 - 3)  glucagon  Injectable 1 milliGRAM(s) IntraMuscular once PRN Glucose LESS THAN 70 milligrams/deciliter  ondansetron Injectable 8 milliGRAM(s) IV Push every 8 hours PRN Nausea and/or Vomiting      Allergies    No Known Allergies    Intolerances    IVIG PRODUCT IS PRIGIVEN (Unknown)      LABS:                        8.6    6.90  )-----------( 266      ( 07 May 2019 06:52 )             30.3     05-07    141  |  96  |  13  ----------------------------<  125<H>  4.7   |  36<H>  |  0.40<L>    Ca    9.5      07 May 2019 06:52  Phos  3.3     05-07  Mg     2.2     05-07    TPro  7.1  /  Alb  3.0<L>  /  TBili  <0.2  /  DBili  x   /  AST  12  /  ALT  15  /  AlkPhos  95  05-07          RADIOLOGY & ADDITIONAL TESTS:  Reviewed Transfer Note: [facility] to MICU  Hospital Course   59 F w/ post-polio syndrome, CIPD? s/p IVIG with Privagen, Stage IV Endometrial Carcinoma s/p staging surgery 7/2018 and 1 cycle of chemo in 2/2019, s/p exlap, mobilization of splenic flexure, end colostomy, rectovaginal fistula w/ recurrent admissions UTIs/bacteremia, recently admitted for Proteus mirabilis ESBL septicemia in April on Meropenem, presented from [facility] in urosepsis, intubated in ED for airway protection and admitted to MICU. Patient was weaned off levophed and was extubated on 5/2 then transferred to GYN service. Surveillance blood cultures were negative. Both urine and initial blood cx growing ESBL e coli sensitive to meropenem and transitioned to ertapenem. Pt then transferred to [facility] for concern for worsening neuro exam, concern for airway protection started on one course of IVIG (Privigen) 5/7. This AM Pt more lethargic: worsening strength exam in neck, could not use straw, could not participate in NIF/FVC. ABG with pH 7.27, PCO2 105, HCO3 48. Intubated on 7La and stepped up to MICU for CO2 narcosis 2/2 likely respiratory muscle insufficiency.     INTERVAL HPI/   Patient was seen and examined at bedside. O/n has been intermittently refusing Lovenox, refusing to swallow pills due to tiredness. Stating "I want to go home to die". Denies pain, cough, SoB.   VITAL SIGNS:  T(F): 96.1 (05-08-19 @ 09:25)  HR: 112 (05-08-19 @ 08:22)  BP: 130/76 (05-08-19 @ 08:22)  RR: 17 (05-08-19 @ 08:22)  SpO2: 100% (05-08-19 @ 08:22)  Wt(kg): --    PHYSICAL EXAM:  Constitutional: pale, debilitated appearing, lethargic, slumping over in bed   HEENT: PERRL, EOMI, sclera non-icteric, neck supple, trachea midline, no masses, no JVD, MMM, good dentition; weak neck muscles -3/5 in flexion/extension,   Respiratory: poor inspiratory effort, weak cough, CTA bilateral no wheeze/crackles, decreased breath sounds no tachypnea, no rales, without accessory muscle use and no intercostal retractions  Cardiovascular: RRR, normal S1S2, no M/R/G  Gastrointestinal: midline scar, soft, non-distended, colostomy bag in LLQ with brown stool, pink mucosa, BSx4  RUE: 4/5 bilat, LE: -3/5 poor muscle tone, poor tone in trunk, sluggish H test.   Extremities: Warm, well perfused, pulses equal bilateral upper and lower extremities, 2+ edema up to thighs, no clubbing  Neurological: AAOx2-3, CN Grossly intact  Skin: Normal temperature, warm, dry      MEDICATIONS  (STANDING):  acetaminophen   Tablet .. 650 milliGRAM(s) Oral once  dextrose 5%. 1000 milliLiter(s) (50 mL/Hr) IV Continuous <Continuous>  dextrose 50% Injectable 25 Gram(s) IV Push once  enoxaparin Injectable 40 milliGRAM(s) SubCutaneous every 24 hours  ertapenem  IVPB 1000 milliGRAM(s) IV Intermittent every 24 hours  insulin lispro (HumaLOG) corrective regimen sliding scale   SubCutaneous Before meals and at bedtime  metFORMIN 1000 milliGRAM(s) Oral two times a day  pantoprazole   Suspension 40 milliGRAM(s) Oral daily  petrolatum Ophthalmic Ointment 1 Application(s) Both EYES three times a day  tamoxifen 20 milliGRAM(s) Oral two times a day  zinc oxide 20% Ointment 1 Application(s) Topical three times a day    MEDICATIONS  (PRN):  acetaminophen   Tablet .. 650 milliGRAM(s) Oral every 6 hours PRN Temp greater or equal to 38C (100.4F), Mild Pain (1 - 3)  glucagon  Injectable 1 milliGRAM(s) IntraMuscular once PRN Glucose LESS THAN 70 milligrams/deciliter  ondansetron Injectable 8 milliGRAM(s) IV Push every 8 hours PRN Nausea and/or Vomiting      Allergies    No Known Allergies    Intolerances    IVIG PRODUCT IS PRIGIVEN (Unknown)      LABS:                        8.6    6.90  )-----------( 266      ( 07 May 2019 06:52 )             30.3     05-07    141  |  96  |  13  ----------------------------<  125<H>  4.7   |  36<H>  |  0.40<L>    Ca    9.5      07 May 2019 06:52  Phos  3.3     05-07  Mg     2.2     05-07    TPro  7.1  /  Alb  3.0<L>  /  TBili  <0.2  /  DBili  x   /  AST  12  /  ALT  15  /  AlkPhos  95  05-07          RADIOLOGY & ADDITIONAL TESTS:  Reviewed

## 2019-05-08 NOTE — PROGRESS NOTE ADULT - PROBLEM SELECTOR PLAN 3
Rapid response called for tachycardia > 130, acute mental status change, decreased responsiveness. Patient intubated while in ER holding 4/30-5/2 now still high O2 requirements, suspect pulm edema vs NM etiology ? (CIDP vs post-polio?) At admission Pt was in septic shock unresponsive to fluids and briefly requiring Levophed in MICU, weaned off on 5/1 am. Blood and urine cultures from 4/30 growing ESBL Ecoli sensitive to meropenem. Pt was placed on Ertapenem with plan to continue abx until 5/14 At admission Pt was in septic shock unresponsive to fluids and briefly requiring Levophed in MICU, weaned off on 5/1 am. Blood and urine cultures from 4/30 growing ESBL Ecoli sensitive to meropenem. Pt was placed on Ertapenem with plan to continue abx until 5/14    #ESBL Ecoli UTI   -Plan as above

## 2019-05-08 NOTE — PROGRESS NOTE ADULT - ASSESSMENT
60yo F PMHx polio, breast cancer, DM, Endometrial Cancer s/p exploratory laparotomy, enterolysis, SHAMIR, BSO, pelvic lymphadenectomy, low anterior resection mobilization of splenic flexure, end colostomy on 7/30/18, rectovaginal fistula, numerous admissions for urinary tract infection presented to [facility] in the setting of urosepsis. Hospital course complicated by blood stream infection (currently on ertapenem) and respiratory failure requiring intubation s/p extubation on 5/2. Neurology consulted for worsening lower extremity weakness. On prior admission EMG raises suspicion for CIDP-spectrum disorder. While patient was in rehab she was able to walk w/ walker and prior to admission developed worsening gait. 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     - s/p Day 1 of IVIG 500mg/kg on 5/7/2019   - Please give Day 2 of IVIG 500mg/kg on 5/8/2019   - Given A blood type, will need anti A antibody depleted formulation - please order Privigen IVIG formulation (not Gammagard) to avoid possible effect of hemolytic anemia  - f/u on MG antibodies: Acetylcholine Blocking AB, Acetylcholine Modulating AB, Acetylcholine Receptor Binding Antibody, MuSK Antibody Test  - if patient is intubated will need Lumbar puncture     Neurology will follow. 58yo F PMHx polio, breast cancer, DM, Endometrial Cancer s/p exploratory laparotomy, enterolysis, SHAMIR, BSO, pelvic lymphadenectomy, low anterior resection mobilization of splenic flexure, end colostomy on 7/30/18, rectovaginal fistula, numerous admissions for urinary tract infection presented to [facility] in the setting of urosepsis. Hospital course complicated by blood stream infection (currently on ertapenem) and respiratory failure requiring intubation s/p extubation on 5/2. Neurology consulted for worsening lower extremity weakness. On prior admission EMG raises suspicion for CIDP-spectrum disorder. While patient was in rehab she was able to walk w/ walker and prior to admission developed worsening gait. 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     - s/p Day 1 of IVIG 500mg/kg on 5/7/2019   - Please give Day 2 of IVIG 500mg/kg on 5/8/2019, plan for 4 day course  - Given A blood type, will need anti A antibody depleted formulation - please order Privigen IVIG formulation (not Gammagard) to avoid possible effect of hemolytic anemia  - f/u on MG antibodies: Acetylcholine Blocking AB, Acetylcholine Modulating AB, Acetylcholine Receptor Binding Antibody, MuSK Antibody Test  - if patient is intubated will need Lumbar puncture   - MR brain w and wo con once intubated    Neurology will follow.

## 2019-05-08 NOTE — PROGRESS NOTE ADULT - ATTENDING COMMENTS
On further discussion with her gynecologist Dr. Spivey, although pt had been having generalized weakness for months in the setting of her complex medical issues, she became noticeably weak mala in her hands for approx 1 week prior to being diagnosed with IDP in April, suggesting a more acute process.  On exam, she had profound facial weakness, on high flow due to resp weakness, limited ocular movements in all directions, as well as 3/5 in the R arm and 2+/5 L arm and 0-1 in the legs.  Worse than yesterday.  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  Therefore would continue IVIG at 0.5g/kg/day for total 4 days (anti A antibody depleted version such as Privigen or Gammunex).      Once intubated, would recommend MR brain w and wo con and LP looking for cancer spread (although atypical for endometrial cancer to spread to leptomeningeal spread), other cause of polyradic and cranial neuropathies

## 2019-05-08 NOTE — PROGRESS NOTE ADULT - SUBJECTIVE AND OBJECTIVE BOX
GYN Progress Note    Patient seen at bedside.  She is on high flow NC and saturating well however appears exhausted and is only slightly arousable/alert, and only in response to a stimulus.   discussed with the Medicine team at this team regarding ICU consult and ABG which was declined and that she would continue to be monitored closely and they assured that those tasks would be done as soon as they were indicated.   pt not taking oral meds this AM due to clinical condition.  on IVIG for 2 days per ID recs.    Denies CP, palpitations, SOB, fever, chills, nausea, vomiting.    Vital Signs Last 24 Hrs  T(C): 35.6 (08 May 2019 09:25), Max: 37.8 (07 May 2019 17:03)  T(F): 96.1 (08 May 2019 09:25), Max: 100 (07 May 2019 17:03)  HR: 86 (08 May 2019 14:00) (86 - 116)  BP: 114/73 (08 May 2019 14:00) (93/43 - 140/74)  BP(mean): 101 (08 May 2019 14:00) (48 - 106)  RR: 20 (08 May 2019 14:00) (16 - 24)  SpO2: 100% (08 May 2019 14:00) (92% - 100%)    Physical Exam:  Gen: pt alert only to stimuli; appears profoundly weak  Pulm: Clear to auscultation bilaterally anteriorly; increased work of breathing, accessory muscle use  GI: pt declined exam  Ext: SCDs in place, [facility]    I&O's Summary    07 May 2019 07:01  -  08 May 2019 07:00  --------------------------------------------------------  IN: 400 mL / OUT: 1990 mL / NET: -1590 mL    08 May 2019 07:01  -  08 May 2019 14:52  --------------------------------------------------------  IN: 0 mL / OUT: 150 mL / NET: -150 mL      MEDICATIONS  (STANDING):  acetaminophen   Tablet .. 650 milliGRAM(s) Oral once  dextrose 5%. 1000 milliLiter(s) (50 mL/Hr) IV Continuous <Continuous>  dextrose 50% Injectable 25 Gram(s) IV Push once  enoxaparin Injectable 40 milliGRAM(s) SubCutaneous every 24 hours  ertapenem  IVPB 1000 milliGRAM(s) IV Intermittent every 24 hours  insulin lispro (HumaLOG) corrective regimen sliding scale   SubCutaneous Before meals and at bedtime  pantoprazole   Suspension 40 milliGRAM(s) Oral daily  petrolatum Ophthalmic Ointment 1 Application(s) Both EYES three times a day  propofol Infusion 5 MICROgram(s)/kG/Min (1.749 mL/Hr) IV Continuous <Continuous>  tamoxifen 20 milliGRAM(s) Oral two times a day  zinc oxide 20% Ointment 1 Application(s) Topical three times a day    MEDICATIONS  (PRN):  acetaminophen   Tablet .. 650 milliGRAM(s) Oral every 6 hours PRN Temp greater or equal to 38C (100.4F), Mild Pain (1 - 3)  glucagon  Injectable 1 milliGRAM(s) IntraMuscular once PRN Glucose LESS THAN 70 milligrams/deciliter  ondansetron Injectable 8 milliGRAM(s) IV Push every 8 hours PRN Nausea and/or Vomiting      LABS:                        8.4    7.89  )-----------( 300      ( 08 May 2019 13:58 )             28.6     05-08    140  |  90<L>  |  19  ----------------------------<  109<H>  4.6   |  38<H>  |  0.43<L>    Ca    9.7      08 May 2019 13:58  Phos  3.3     05-07  Mg     2.2     05-07    TPro  7.8  /  Alb  3.2<L>  /  TBili  0.2  /  DBili  x   /  AST  13  /  ALT  13  /  AlkPhos  86  05-08    PT/INR - ( 08 May 2019 13:57 )   PT: 12.7 sec;   INR: 1.12          PTT - ( 08 May 2019 13:57 )  PTT:30.5 sec

## 2019-05-08 NOTE — PROGRESS NOTE ADULT - PROBLEM SELECTOR PLAN 2
CIDP suspected per discussion w/ neurology based on EMG data. s/p IVIG with Privigen (due to concern for Pt's blood type and AIHA) last admission.  Now with marked weakness, affecting respiratory status neuro reconsulted for IVIG   -Starting IVIG 500mg/kg on 5/7  -Neuro recs appreciated Last admission in April 2019, Neuro (Dr. Sellers) was consulted for LE weakness, EMG done showed evidence of demyelinating neuropathy and CIDP-spectrum disorder. She received 5 day of IVIG treatment (Privigen, since Pt some risk of of AIHA given blood type) with some functional improvement, had planned for outpt maintenance with IVIG. Neuro was consulted again this admission for acute worsening of LE weakness (worse in LE, but notable truncal/head instability) s/p IVIG 500mg/kg on 5/7  -F/u Myasthenia Gravis Antibodies   -Neuro recs appreciated

## 2019-05-08 NOTE — PROGRESS NOTE ADULT - PROBLEM SELECTOR PLAN 7
Pt had rectovaginal (resolving?) and enterovaginal fistulas   -  evaluated and discussed option of supra-pubic catheter that may decrease, but not eliminate her risk/propensity for recurrent urinary infections. [place] since removed; awaiting further mgmt/recs on this matter. H/o breast cancer in 2009 s/p chemo and radiation with left breast lumpectomy

## 2019-05-08 NOTE — PROVIDER CONTACT NOTE (CHANGE IN STATUS NOTIFICATION) - ACTION/TREATMENT ORDERED:
awaiting intervention from MICU & GYN team. will continue to monitor.
no intervention at this time. will continue to monitor.
MD notified. no further interventions at this time. Will continue to monitor.

## 2019-05-08 NOTE — PROGRESS NOTE ADULT - PROBLEM SELECTOR PLAN 1
7 beats of ventricular tachycardia this AM. [place]ola WNL. Given 2mg Magnesium  -C/w tele Also component of acute hypoxic failure, was briefly intubated at admission for WoB/AMS then high oxygen requirements. Neuro exam worsened on 7Lac o/n, with ABG pH 7.27, PCO2 105, HCO3 48 Pt was emergently intubated by CO2 toxicosis on 5/8 and stepped up to MICU. Leading suspicion for weakness is neuro or immune related (CIPD vs paraneoplastic vs AIDP vs past chemotherapy use)

## 2019-05-08 NOTE — PROGRESS NOTE ADULT - SUBJECTIVE AND OBJECTIVE BOX
TRANSFER 7LACHMAN TO MICU  Hospital Course   59 F w/ post-polio syndrome, CIPD? s/p IVIG with Privagen, Stage IV Endometrial Carcinoma s/p staging surgery 7/2018 and 1 cycle of chemo in 2/2019, s/p exlap, mobilization of splenic flexure, end colostomy, rectovaginal fistula w/ recurrent admissions UTIs/bacteremia, recently admitted for Proteus mirabilis ESBL septicemia in April on Meropenem, presented from [facility] in urosepsis, intubated in ED for airway protection and admitted to MICU. Patient was weaned off levophed and was extubated on 5/2 then transferred to GYN service. Surveillance blood cultures were negative. Both urine and initial blood cx growing ESBL e coli sensitive to meropenem and transitioned to ertapenem. Pt then transferred to [facility] for concern for worsening neuro exam, concern for airway protection started on one course of IVIG (Privigen) 5/7. This AM Pt more lethargic: worsening strength exam in neck, could not use straw, could not participate in NIF/FVC. ABG with pH 7.27, PCO2 105, HCO3 48. Intubated on [facility] and stepped up to MICU for CO2 narcosis 2/2 likely respiratory muscle insufficiency.     INTERVAL HPI/OVERNIGHT EVENTS: Patient intubated on 7 lachman and transferred to [facility]. On 5 [facility] had an LP.    SUBJECTIVE: Patient seen and examined at bedside. Intubated, lightly sedated. Able to shake head yes or no appropriately.    OBJECTIVE:    VITAL SIGNS:  ICU Vital Signs Last 24 Hrs  T(C): 38.6 (08 May 2019 15:40), Max: 38.6 (08 May 2019 15:40)  T(F): 101.4 (08 May 2019 15:40), Max: 101.4 (08 May 2019 15:40)  HR: 90 (08 May 2019 15:40) (86 - 116)  BP: 112/63 (08 May 2019 15:40) (91/60 - 140/74)  BP(mean): 75 (08 May 2019 15:40) (48 - 106)  ABP: --  ABP(mean): --  RR: 12 (08 May 2019 15:40) (12 - 32)  SpO2: 97% (08 May 2019 15:40) (92% - 100%)        05-07 @ 07:01  -  05-08 @ 07:00  --------------------------------------------------------  IN: 400 mL / OUT: 1990 mL / NET: -1590 mL    05-08 @ 07:01  -  05-08 @ 16:16  --------------------------------------------------------  IN: 0 mL / OUT: 150 mL / NET: -150 mL      CAPILLARY BLOOD GLUCOSE      POCT Blood Glucose.: 135 mg/dL (08 May 2019 11:22)      PHYSICAL EXAM:  General: NAD, comfortable, intubated  HEENT: NCAT, PERRL, clear conjunctiva, no scleral icterus  Neck: supple, no JVD  Respiratory: CTA b/l, no wheezing, rhonchi, rales  Cardiovascular: RRR, normal S1S2, no M/R/G  Vascular: 2+ radial and DP pulses  Abdomen: soft, NT/ND, bowel sounds present, central scar well healed, colostomy w stoma pink without signs/sx of infection, no palpable masses  Extremities: WWP, no clubbing, cyanosis, or edema  Skin: No rashes present  Neuro: Intubated and lightly sedated, able to move all extremities spontaneously however 2/5 throughout. Also, prior to intubation, barely able to lift head off bed.    MEDICATIONS:  MEDICATIONS  (STANDING):  acetaminophen   Tablet .. 650 milliGRAM(s) Oral once  dextrose 5%. 1000 milliLiter(s) (50 mL/Hr) IV Continuous <Continuous>  dextrose 50% Injectable 25 Gram(s) IV Push once  enoxaparin Injectable 40 milliGRAM(s) SubCutaneous every 24 hours  ertapenem  IVPB 1000 milliGRAM(s) IV Intermittent every 24 hours  insulin lispro (HumaLOG) corrective regimen sliding scale   SubCutaneous Before meals and at bedtime  pantoprazole   Suspension 40 milliGRAM(s) Oral daily  petrolatum Ophthalmic Ointment 1 Application(s) Both EYES three times a day  propofol Infusion 5 MICROgram(s)/kG/Min (1.749 mL/Hr) IV Continuous <Continuous>  tamoxifen 20 milliGRAM(s) Oral two times a day  zinc oxide 20% Ointment 1 Application(s) Topical three times a day    MEDICATIONS  (PRN):  acetaminophen   Tablet .. 650 milliGRAM(s) Oral every 6 hours PRN Temp greater or equal to 38C (100.4F), Mild Pain (1 - 3)  glucagon  Injectable 1 milliGRAM(s) IntraMuscular once PRN Glucose LESS THAN 70 milligrams/deciliter  ondansetron Injectable 8 milliGRAM(s) IV Push every 8 hours PRN Nausea and/or Vomiting      ALLERGIES:  Allergies    No Known Allergies    Intolerances    IVIG PRODUCT IS PRIGIVEN (Unknown)      LABS:                        8.4    7.89  )-----------( 300      ( 08 May 2019 13:58 )             28.6     05-08    140  |  90<L>  |  19  ----------------------------<  109<H>  4.6   |  38<H>  |  0.43<L>    Ca    9.7      08 May 2019 13:58  Phos  3.3     05-07  Mg     2.2     05-07    TPro  7.8  /  Alb  3.2<L>  /  TBili  0.2  /  DBili  x   /  AST  13  /  ALT  13  /  AlkPhos  86  05-08    PT/INR - ( 08 May 2019 13:57 )   PT: 12.7 sec;   INR: 1.12          PTT - ( 08 May 2019 13:57 )  PTT:30.5 sec      RADIOLOGY & ADDITIONAL TESTS: Reviewed.

## 2019-05-08 NOTE — PROGRESS NOTE ADULT - PROBLEM SELECTOR PLAN 7
As discussed w/ neurology, element of PPS on EMG testing but would not explain her upper extremity weakness and respiratory weakness; would only be complicating her prior LE weakness that she has had longstanding since polio dx  - appreciate neurology mgmt  - mgmt otherwise as above in problem #5 and 6 Suspected per discussion w/ neurology based on EMG data; now s/p IVIG yesterday w/ acute (on subacutely) worsening weakness  - per neurology and primary team planned for IVIG +/- steroids  -received first dose IVIG last evening  - appreciate neuro input  - patient seen and examined with primary team and CCM; s/p ABG showing acute hypercapnic respiratory failure and requiring urgent intubation on 7lachman, escalated patient to MICU following intubation which occurred uneventfully. See primary team transfer/event note for further detail.   - mgmt otherwise as above in problem #5  - Prognosis is guarded

## 2019-05-08 NOTE — PROGRESS NOTE ADULT - PROBLEM SELECTOR PLAN 6
Suspected per discussion w/ neurology based on EMG data; now s/p IVIG yesterday w/ acute (on subacutely) worsening weakness  - per neurology and primary team planned for IVIG +/- steroids  - appreciate neuro input  - patient seen and examined with primary team and CCM; s/p ABG showing acute hypercapnic respiratory failure and requiring urgent intubation on 7lachman, escalated patient to MICU following intubation which occurred uneventfully. See primary team transfer/event note for further detail.   - mgmt otherwise as above in problem #5  - Prognosis is guarded Significant debility and weakness that is exacerbated by post-polio syndrome; had been in subacute rehab facility for LE weakness and with gait/strength training regimen via PT; now further complicated by recent intubation and septic shock  - continue PT  - out of bed to chair  - nutritional support  - patient distressed by her increasing dependence, not sure she will be well enough to return home.

## 2019-05-08 NOTE — PROGRESS NOTE ADULT - ASSESSMENT
58yo F PMHx polio, breast cancer, DM, Endometrial Cancer s/p exploratory laparotomy, enterolysis, SHAMIR, BSO, pelvic lymphadenectomy, low anterior resection mobilization of splenic flexure, end colostomy on 7/30/18, rectovaginal fistula, numerous admissions for urinary tract infection presented to [facility] in the setting of urosepsis. Hospital course complicated by blood stream infection (currently on ertapenem) and respiratory failure requiring intubation s/p extubation on 5/2. Now with worsening respiratory status due to likely neurological process (CIPD vs   vs paraneoplastic vs AIDP?) intubated for CO2 narcosis and stepped up to MICU.     CARDIOVASCULAR  #hemodynamics  Currently requiring levophed 2/2 to sedation  - making appropriate amounts of urine, mentating well despite light sedation  - extremities cool to touch. 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)  - ABG w CO2 102 on am of 5/8, and patient lethargic  - continue w intubation and ventilation as patient w respiratory compromise due to peripheral neurologic process  - Continue treatment w IVIG per neurology  - monitor ABGs      #hx of urinary retention  - passed TOV on 5/7 now w permacath  - will need daily bladder scans to ensure not retaining  - might need SP catheter  - appreciate urology recs    Neurology  #Acute polyneuropathy  - patient w likely AIDP leading to respiratory compromise  - previous EMG w demyelnating polyneuropathy  - recs per neurology  - trial of IVIG on 5/7 and 5/8 to determine further treatment  - f/u results of LP studies      INFECTIOUS DISEASE  #hx of recurrent ESBL ecoli UTI and bacteremia  - previously on admission patient admitted to MICU in septic shock 2/2 to ESBL ecoli bactermia and UTI  - continue ertepenem daily per ID recs    HEME/ONC  #Stage IV endometrial adenocarcinoma  per GYN-ONC w/ interval increase in tumor burden. Pt was treated with Anastrazole and initiated 3 weeks of megace 80mg BID followed by 3 weeks of tamoxifen. started megace and metformin 4/10, now on Tamoxifen for 21 days (5/1/19-).    FLUIDS/ELECTROLYTES/NUTRITION  -IVF: none  -Monitor, Replete to K>4 and Mg>2  -Diet: NPO  PROPHYLAXIS  -DVT: lovenox and SCDs  -GI: protonix    DISPO: MICU  CODE STATUS: full code

## 2019-05-08 NOTE — PROGRESS NOTE ADULT - PROBLEM SELECTOR PLAN 4
Initially requiring levophed, weaned off on 5/1 am  - blood and urine cultures from 4/30 growing ESBL Ecoli  sensitive to meropenem  repeat Bcx NGTD  -C/w ertapenem 1g q24 Pt has hx recurrent UTIs 2/2 colovaginal fistula, has had indwelling catheter for a couple of months. Uro was consulted this admission for possible suprapubic catheter. Passed TOV on 5/7, however, still concern Pt could start retaining urine   -C/w daily bladder scans

## 2019-05-09 ENCOUNTER — RESULT REVIEW (OUTPATIENT)
Age: 59
End: 2019-05-09

## 2019-05-09 DIAGNOSIS — G61.0 GUILLAIN-BARRE SYNDROME: ICD-10-CM

## 2019-05-09 DIAGNOSIS — R78.81 BACTEREMIA: ICD-10-CM

## 2019-05-09 LAB
ALBUMIN CSF-MCNC: 13.1 MG/DL — LOW (ref 14–25)
ALBUMIN SERPL ELPH-MCNC: 2.6 G/DL — LOW (ref 3.3–5)
ALBUMIN SERPL ELPH-MCNC: 2561 MG/DL — LOW (ref 3500–5200)
ALP SERPL-CCNC: 74 U/L — SIGNIFICANT CHANGE UP (ref 40–120)
ALT FLD-CCNC: 11 U/L — SIGNIFICANT CHANGE UP (ref 10–45)
ANION GAP SERPL CALC-SCNC: 11 MMOL/L — SIGNIFICANT CHANGE UP (ref 5–17)
ANION GAP SERPL CALC-SCNC: 14 MMOL/L — SIGNIFICANT CHANGE UP (ref 5–17)
APPEARANCE UR: ABNORMAL
AST SERPL-CCNC: 13 U/L — SIGNIFICANT CHANGE UP (ref 10–40)
BASE EXCESS BLDA CALC-SCNC: 11.2 MMOL/L — HIGH (ref -2–3)
BASE EXCESS BLDA CALC-SCNC: 5.6 MMOL/L — HIGH (ref -2–3)
BASE EXCESS BLDA CALC-SCNC: 9.8 MMOL/L — HIGH (ref -2–3)
BILIRUB SERPL-MCNC: <0.2 MG/DL — SIGNIFICANT CHANGE UP (ref 0.2–1.2)
BILIRUB UR-MCNC: ABNORMAL
BUN SERPL-MCNC: 24 MG/DL — HIGH (ref 7–23)
BUN SERPL-MCNC: 26 MG/DL — HIGH (ref 7–23)
CALCIUM SERPL-MCNC: 9.1 MG/DL — SIGNIFICANT CHANGE UP (ref 8.4–10.5)
CALCIUM SERPL-MCNC: 9.1 MG/DL — SIGNIFICANT CHANGE UP (ref 8.4–10.5)
CHLORIDE SERPL-SCNC: 91 MMOL/L — LOW (ref 96–108)
CHLORIDE SERPL-SCNC: 96 MMOL/L — SIGNIFICANT CHANGE UP (ref 96–108)
CO2 SERPL-SCNC: 31 MMOL/L — SIGNIFICANT CHANGE UP (ref 22–31)
CO2 SERPL-SCNC: 32 MMOL/L — HIGH (ref 22–31)
COLOR SPEC: YELLOW — SIGNIFICANT CHANGE UP
CREAT ?TM UR-MCNC: 27 MG/DL — SIGNIFICANT CHANGE UP
CREAT SERPL-MCNC: 0.54 MG/DL — SIGNIFICANT CHANGE UP (ref 0.5–1.3)
CREAT SERPL-MCNC: 0.56 MG/DL — SIGNIFICANT CHANGE UP (ref 0.5–1.3)
DIFF PNL FLD: NEGATIVE — SIGNIFICANT CHANGE UP
GAS PNL BLDA: SIGNIFICANT CHANGE UP
GLUCOSE BLDC GLUCOMTR-MCNC: 103 MG/DL — HIGH (ref 70–99)
GLUCOSE BLDC GLUCOMTR-MCNC: 119 MG/DL — HIGH (ref 70–99)
GLUCOSE BLDC GLUCOMTR-MCNC: 120 MG/DL — HIGH (ref 70–99)
GLUCOSE BLDC GLUCOMTR-MCNC: 223 MG/DL — HIGH (ref 70–99)
GLUCOSE SERPL-MCNC: 103 MG/DL — HIGH (ref 70–99)
GLUCOSE SERPL-MCNC: 125 MG/DL — HIGH (ref 70–99)
GLUCOSE UR QL: NEGATIVE — SIGNIFICANT CHANGE UP
GRAM STN FLD: SIGNIFICANT CHANGE UP
HCO3 BLDA-SCNC: 30 MMOL/L — HIGH (ref 21–28)
HCO3 BLDA-SCNC: 33 MMOL/L — HIGH (ref 21–28)
HCO3 BLDA-SCNC: 34 MMOL/L — HIGH (ref 21–28)
HCT VFR BLD CALC: 26.1 % — LOW (ref 34.5–45)
HGB BLD-MCNC: 7.5 G/DL — LOW (ref 11.5–15.5)
IGG CSF-MCNC: 4.2 MG/DL — HIGH
IGG FLD-MCNC: 1783 MG/DL — HIGH (ref 610–1660)
IGG SYNTH RATE SER+CSF CALC-MRATE: -6.1 MG/DAY — SIGNIFICANT CHANGE UP
IGG/ALB CLEAR SER+CSF-RTO: 0.5 — SIGNIFICANT CHANGE UP
IGG/ALB CSF: 0.32 RATIO — HIGH
IGG/ALB SER: 0.7 RATIO — SIGNIFICANT CHANGE UP
KETONES UR-MCNC: 15 MG/DL
LABORATORY COMMENT REPORT: SIGNIFICANT CHANGE UP
LACTATE SERPL-SCNC: 0.7 MMOL/L — SIGNIFICANT CHANGE UP (ref 0.5–2)
LEUKOCYTE ESTERASE UR-ACNC: NEGATIVE — SIGNIFICANT CHANGE UP
MAGNESIUM SERPL-MCNC: 1.2 MG/DL — LOW (ref 1.6–2.6)
MCHC RBC-ENTMCNC: 28.5 PG — SIGNIFICANT CHANGE UP (ref 27–34)
MCHC RBC-ENTMCNC: 28.7 GM/DL — LOW (ref 32–36)
MCV RBC AUTO: 99.2 FL — SIGNIFICANT CHANGE UP (ref 80–100)
NITRITE UR-MCNC: NEGATIVE — SIGNIFICANT CHANGE UP
NRBC # BLD: 0 /100 WBCS — SIGNIFICANT CHANGE UP (ref 0–0)
PCO2 BLDA: 39 MMHG — SIGNIFICANT CHANGE UP (ref 32–45)
PCO2 BLDA: 41 MMHG — SIGNIFICANT CHANGE UP (ref 32–45)
PCO2 BLDA: 45 MMHG — SIGNIFICANT CHANGE UP (ref 32–45)
PH BLDA: 7.44 — SIGNIFICANT CHANGE UP (ref 7.35–7.45)
PH BLDA: 7.53 — HIGH (ref 7.35–7.45)
PH BLDA: 7.56 — HIGH (ref 7.35–7.45)
PH UR: 8.5 — HIGH (ref 5–8)
PHOSPHATE SERPL-MCNC: 2.3 MG/DL — LOW (ref 2.5–4.5)
PLATELET # BLD AUTO: 243 K/UL — SIGNIFICANT CHANGE UP (ref 150–400)
PO2 BLDA: 130 MMHG — HIGH (ref 83–108)
PO2 BLDA: 259 MMHG — HIGH (ref 83–108)
PO2 BLDA: 267 MMHG — HIGH (ref 83–108)
POTASSIUM SERPL-MCNC: 3.2 MMOL/L — LOW (ref 3.5–5.3)
POTASSIUM SERPL-MCNC: 4.4 MMOL/L — SIGNIFICANT CHANGE UP (ref 3.5–5.3)
POTASSIUM SERPL-SCNC: 3.2 MMOL/L — LOW (ref 3.5–5.3)
POTASSIUM SERPL-SCNC: 4.4 MMOL/L — SIGNIFICANT CHANGE UP (ref 3.5–5.3)
PROT SERPL-MCNC: 7.8 G/DL — SIGNIFICANT CHANGE UP (ref 6–8.3)
PROT UR-MCNC: 30 MG/DL
RBC # BLD: 2.63 M/UL — LOW (ref 3.8–5.2)
RBC # FLD: 14.6 % — HIGH (ref 10.3–14.5)
SAO2 % BLDA: 100 % — SIGNIFICANT CHANGE UP (ref 95–100)
SAO2 % BLDA: 100 % — SIGNIFICANT CHANGE UP (ref 95–100)
SAO2 % BLDA: 99 % — SIGNIFICANT CHANGE UP (ref 95–100)
SODIUM SERPL-SCNC: 137 MMOL/L — SIGNIFICANT CHANGE UP (ref 135–145)
SODIUM SERPL-SCNC: 138 MMOL/L — SIGNIFICANT CHANGE UP (ref 135–145)
SODIUM UR-SCNC: 113 MMOL/L — SIGNIFICANT CHANGE UP
SOURCE HSV 1/2: SIGNIFICANT CHANGE UP
SP GR SPEC: 1.01 — SIGNIFICANT CHANGE UP (ref 1–1.03)
SPECIMEN SOURCE: SIGNIFICANT CHANGE UP
UROBILINOGEN FLD QL: 0.2 E.U./DL — SIGNIFICANT CHANGE UP
WBC # BLD: 7.27 K/UL — SIGNIFICANT CHANGE UP (ref 3.8–10.5)
WBC # FLD AUTO: 7.27 K/UL — SIGNIFICANT CHANGE UP (ref 3.8–10.5)

## 2019-05-09 PROCEDURE — 99233 SBSQ HOSP IP/OBS HIGH 50: CPT | Mod: GC

## 2019-05-09 PROCEDURE — 71045 X-RAY EXAM CHEST 1 VIEW: CPT | Mod: 26

## 2019-05-09 PROCEDURE — 99291 CRITICAL CARE FIRST HOUR: CPT

## 2019-05-09 RX ORDER — POTASSIUM CHLORIDE 20 MEQ
40 PACKET (EA) ORAL EVERY 4 HOURS
Refills: 0 | Status: COMPLETED | OUTPATIENT
Start: 2019-05-09 | End: 2019-05-09

## 2019-05-09 RX ORDER — TAMOXIFEN CITRATE 20 MG/1
20 TABLET, FILM COATED ORAL
Refills: 0 | Status: DISCONTINUED | OUTPATIENT
Start: 2019-05-10 | End: 2019-05-10

## 2019-05-09 RX ORDER — INSULIN LISPRO 100/ML
VIAL (ML) SUBCUTANEOUS EVERY 6 HOURS
Refills: 0 | Status: DISCONTINUED | OUTPATIENT
Start: 2019-05-09 | End: 2019-05-14

## 2019-05-09 RX ORDER — SODIUM CHLORIDE 9 MG/ML
500 INJECTION INTRAMUSCULAR; INTRAVENOUS; SUBCUTANEOUS ONCE
Refills: 0 | Status: COMPLETED | OUTPATIENT
Start: 2019-05-09 | End: 2019-05-09

## 2019-05-09 RX ORDER — IMMUNE GLOBULIN,GAMMA(IGG) 5 %
30 VIAL (ML) INTRAVENOUS EVERY 24 HOURS
Refills: 0 | Status: COMPLETED | OUTPATIENT
Start: 2019-05-09 | End: 2019-05-10

## 2019-05-09 RX ORDER — MAGNESIUM SULFATE 500 MG/ML
4 VIAL (ML) INJECTION ONCE
Refills: 0 | Status: COMPLETED | OUTPATIENT
Start: 2019-05-09 | End: 2019-05-09

## 2019-05-09 RX ORDER — DIPHENHYDRAMINE HCL 50 MG
25 CAPSULE ORAL ONCE
Refills: 0 | Status: COMPLETED | OUTPATIENT
Start: 2019-05-09 | End: 2019-05-09

## 2019-05-09 RX ORDER — DIPHENHYDRAMINE HCL 50 MG
25 CAPSULE ORAL ONCE
Refills: 0 | Status: COMPLETED | OUTPATIENT
Start: 2019-05-10 | End: 2019-05-10

## 2019-05-09 RX ORDER — SODIUM CHLORIDE 9 MG/ML
1000 INJECTION INTRAMUSCULAR; INTRAVENOUS; SUBCUTANEOUS
Refills: 0 | Status: DISCONTINUED | OUTPATIENT
Start: 2019-05-09 | End: 2019-05-09

## 2019-05-09 RX ORDER — SODIUM CHLORIDE 9 MG/ML
1000 INJECTION INTRAMUSCULAR; INTRAVENOUS; SUBCUTANEOUS
Refills: 0 | Status: DISCONTINUED | OUTPATIENT
Start: 2019-05-09 | End: 2019-05-10

## 2019-05-09 RX ORDER — ACETAMINOPHEN 500 MG
650 TABLET ORAL ONCE
Refills: 0 | Status: COMPLETED | OUTPATIENT
Start: 2019-05-09 | End: 2019-05-09

## 2019-05-09 RX ORDER — ACETAMINOPHEN 500 MG
650 TABLET ORAL ONCE
Refills: 0 | Status: COMPLETED | OUTPATIENT
Start: 2019-05-10 | End: 2019-05-10

## 2019-05-09 RX ADMIN — Medication 650 MILLIGRAM(S): at 18:12

## 2019-05-09 RX ADMIN — Medication 1 APPLICATION(S): at 22:31

## 2019-05-09 RX ADMIN — NYSTATIN CREAM 1 APPLICATION(S): 100000 CREAM TOPICAL at 06:22

## 2019-05-09 RX ADMIN — Medication 1 APPLICATION(S): at 06:20

## 2019-05-09 RX ADMIN — Medication 25 MILLIGRAM(S): at 18:12

## 2019-05-09 RX ADMIN — CHLORHEXIDINE GLUCONATE 15 MILLILITER(S): 213 SOLUTION TOPICAL at 06:22

## 2019-05-09 RX ADMIN — SODIUM CHLORIDE 1000 MILLILITER(S): 9 INJECTION INTRAMUSCULAR; INTRAVENOUS; SUBCUTANEOUS at 01:30

## 2019-05-09 RX ADMIN — SODIUM CHLORIDE 100 MILLILITER(S): 9 INJECTION INTRAMUSCULAR; INTRAVENOUS; SUBCUTANEOUS at 16:28

## 2019-05-09 RX ADMIN — Medication 1 APPLICATION(S): at 13:25

## 2019-05-09 RX ADMIN — Medication 40 MILLIEQUIVALENT(S): at 02:39

## 2019-05-09 RX ADMIN — ZINC OXIDE 1 APPLICATION(S): 200 OINTMENT TOPICAL at 13:26

## 2019-05-09 RX ADMIN — CHLORHEXIDINE GLUCONATE 1 APPLICATION(S): 213 SOLUTION TOPICAL at 06:19

## 2019-05-09 RX ADMIN — CHLORHEXIDINE GLUCONATE 15 MILLILITER(S): 213 SOLUTION TOPICAL at 18:12

## 2019-05-09 RX ADMIN — NYSTATIN CREAM 1 APPLICATION(S): 100000 CREAM TOPICAL at 22:31

## 2019-05-09 RX ADMIN — ZINC OXIDE 1 APPLICATION(S): 200 OINTMENT TOPICAL at 22:31

## 2019-05-09 RX ADMIN — ENOXAPARIN SODIUM 40 MILLIGRAM(S): 100 INJECTION SUBCUTANEOUS at 00:35

## 2019-05-09 RX ADMIN — Medication 50 GRAM(S): at 18:48

## 2019-05-09 RX ADMIN — ZINC OXIDE 1 APPLICATION(S): 200 OINTMENT TOPICAL at 06:20

## 2019-05-09 RX ADMIN — ERTAPENEM SODIUM 120 MILLIGRAM(S): 1 INJECTION, POWDER, LYOPHILIZED, FOR SOLUTION INTRAMUSCULAR; INTRAVENOUS at 22:30

## 2019-05-09 RX ADMIN — NYSTATIN CREAM 1 APPLICATION(S): 100000 CREAM TOPICAL at 13:25

## 2019-05-09 RX ADMIN — Medication 100 GRAM(S): at 07:55

## 2019-05-09 RX ADMIN — PANTOPRAZOLE SODIUM 40 MILLIGRAM(S): 20 TABLET, DELAYED RELEASE ORAL at 12:29

## 2019-05-09 RX ADMIN — Medication 650 MILLIGRAM(S): at 18:54

## 2019-05-09 RX ADMIN — Medication 40 MILLIEQUIVALENT(S): at 06:19

## 2019-05-09 NOTE — PROGRESS NOTE ADULT - SUBJECTIVE AND OBJECTIVE BOX
Incomplete note    INTERVAL HPI/OVERNIGHT EVENTS:      SUBJECTIVE: Patient seen and examined at bedside.     ROS:  CV: Denies chest pain  Resp: Denies SOB  GI: Denies abdominal pain, constipation, diarrhea, nausea, vomiting  : Denies dysuria  ID: Denies fevers, chills  MSK: Denies joint pain     OBJECTIVE:    VITAL SIGNS:  ICU Vital Signs Last 24 Hrs  T(C): 36.6 (09 May 2019 04:38), Max: 38.6 (08 May 2019 15:40)  T(F): 97.8 (09 May 2019 04:38), Max: 101.4 (08 May 2019 15:40)  HR: 68 (09 May 2019 05:00) (66 - 112)  BP: 95/56 (09 May 2019 05:00) (80/59 - 130/76)  BP(mean): 67 (09 May 2019 05:00) (48 - 101)  ABP: --  ABP(mean): --  RR: 9 (09 May 2019 05:00) (9 - 32)  SpO2: 100% (09 May 2019 05:00) (87% - 100%)    Mode: AC/ CMV (Assist Control/ Continuous Mandatory Ventilation), RR (machine): 10, TV (machine): 320, FiO2: 35, PEEP: 5, ITime: 1, MAP: 19, PIP: 22     @ 07:  -   @ 07:00  --------------------------------------------------------  IN: 400 mL / OUT: 1990 mL / NET: -1590 mL     @ 07:  -   @ 06:14  --------------------------------------------------------  IN: 1661.1 mL / OUT: 1246 mL / NET: 415.1 mL      CAPILLARY BLOOD GLUCOSE      POCT Blood Glucose.: 110 mg/dL (08 May 2019 21:41)      PHYSICAL EXAM:  General: NAD, comfortable  HEENT: NCAT, PERRL, clear conjunctiva, no scleral icterus  Neck: supple, no JVD  Respiratory: CTA b/l, no wheezing, rhonchi, rales  Cardiovascular: RRR, normal S1S2, no M/R/G  Vascular: 2+ radial and DP pulses  Abdomen: soft, NT/ND, bowel sounds in all four quadrants, no palpable masses  Extremities: WWP, no clubbing, cyanosis, or edema  Skin: No rashes present  Neuro:     MEDICATIONS:  MEDICATIONS  (STANDING):  chlorhexidine 0.12% Liquid 15 milliLiter(s) Oral Mucosa every 12 hours  chlorhexidine 2% Cloths 1 Application(s) Topical <User Schedule>  dextrose 5%. 1000 milliLiter(s) (50 mL/Hr) IV Continuous <Continuous>  dextrose 50% Injectable 25 Gram(s) IV Push once  enoxaparin Injectable 40 milliGRAM(s) SubCutaneous every 24 hours  ertapenem  IVPB 1000 milliGRAM(s) IV Intermittent every 24 hours  insulin lispro (HumaLOG) corrective regimen sliding scale   SubCutaneous Before meals and at bedtime  magnesium sulfate  IVPB 4 Gram(s) IV Intermittent once  norepinephrine Infusion 0.05 MICROgram(s)/kG/Min (5.466 mL/Hr) IV Continuous <Continuous>  nystatin Powder 1 Application(s) Topical three times a day  pantoprazole   Suspension 40 milliGRAM(s) Oral daily  petrolatum Ophthalmic Ointment 1 Application(s) Both EYES three times a day  potassium chloride   Powder 40 milliEquivalent(s) Oral every 4 hours  propofol Infusion 5 MICROgram(s)/kG/Min (1.749 mL/Hr) IV Continuous <Continuous>  tamoxifen 20 milliGRAM(s) Oral two times a day  zinc oxide 20% Ointment 1 Application(s) Topical three times a day    MEDICATIONS  (PRN):  acetaminophen   Tablet .. 650 milliGRAM(s) Oral every 6 hours PRN Temp greater or equal to 38C (100.4F), Mild Pain (1 - 3)  glucagon  Injectable 1 milliGRAM(s) IntraMuscular once PRN Glucose LESS THAN 70 milligrams/deciliter  ondansetron Injectable 8 milliGRAM(s) IV Push every 8 hours PRN Nausea and/or Vomiting      ALLERGIES:  Allergies    No Known Allergies    Intolerances    IVIG PRODUCT IS PRIGIVEN (Unknown)      LABS:                        7.5    7.27  )-----------( 243      ( 09 May 2019 05:19 )             26.1         138  |  96  |  24<H>  ----------------------------<  103<H>  4.4   |  31  |  0.54    Ca    9.1      09 May 2019 05:19  Phos  2.3       Mg     1.2         TPro  7.8  /  Alb  2.6<L>  /  TBili  <0.2  /  DBili  x   /  AST  13  /  ALT  11  /  AlkPhos  74      PT/INR - ( 08 May 2019 13:57 )   PT: 12.7 sec;   INR: 1.12          PTT - ( 08 May 2019 13:57 )  PTT:30.5 sec  Urinalysis Basic - ( 09 May 2019 02:06 )    Color: Yellow / Appearance: Hazy / S.010 / pH: x  Gluc: x / Ketone: 15 mg/dL  / Bili: Small / Urobili: 0.2 E.U./dL   Blood: x / Protein: 30 mg/dL / Nitrite: NEGATIVE   Leuk Esterase: NEGATIVE / RBC: 5-10 /HPF / WBC > 10 /HPF   Sq Epi: x / Non Sq Epi: 5-10 /HPF / Bacteria: Present /HPF        RADIOLOGY & ADDITIONAL TESTS: Reviewed. INTERVAL HPI/OVERNIGHT EVENTS: Given second dose of IVIG last night. Fever to 101. blood cultures drawn, however low suspicion for infectious process. Likely 2/2 to IVIG.      SUBJECTIVE: Patient seen and examined at bedside. Feels much stronger today. Lifting head off bed, moving and shoulders and hands. Appears happy about this. Wants the ET tube out but understands that she still requires it at this point.    ROS:  CV: Denies chest pain  Resp: Denies SOB  GI: Denies abdominal pain, constipation, diarrhea, nausea, vomiting  : Denies dysuria  ID: Denies fevers, chills  MSK: Denies joint pain     OBJECTIVE:    VITAL SIGNS:  ICU Vital Signs Last 24 Hrs  T(C): 36.6 (09 May 2019 04:38), Max: 38.6 (08 May 2019 15:40)  T(F): 97.8 (09 May 2019 04:38), Max: 101.4 (08 May 2019 15:40)  HR: 68 (09 May 2019 05:00) (66 - 112)  BP: 95/56 (09 May 2019 05:00) (80/59 - 130/76)  BP(mean): 67 (09 May 2019 05:00) (48 - 101)  ABP: --  ABP(mean): --  RR: 9 (09 May 2019 05:00) (9 - 32)  SpO2: 100% (09 May 2019 05:00) (87% - 100%)    Mode: AC/ CMV (Assist Control/ Continuous Mandatory Ventilation), RR (machine): 10, TV (machine): 320, FiO2: 35, PEEP: 5, ITime: 1, MAP: 19, PIP: 22     @ : @ 07:00  --------------------------------------------------------  IN: 400 mL / OUT: 1990 mL / NET: -1590 mL     @ 07:  -   @ 06:14  --------------------------------------------------------  IN: 1661.1 mL / OUT: 1246 mL / NET: 415.1 mL      CAPILLARY BLOOD GLUCOSE      POCT Blood Glucose.: 110 mg/dL (08 May 2019 21:41)      PHYSICAL EXAM:  General: NAD, comfortable  HEENT: NCAT, PERRL, clear conjunctiva, no scleral icterus, ET tube and NG tube in place  Neck: supple, no JVD  Respiratory: CTA b/l, no wheezing, rhonchi, rales  Cardiovascular: RRR, normal S1S2, no M/R/G  Vascular: 2+ radial and DP pulses  Abdomen: soft, NT/ND, bowel sounds present, colostomy bag w hard stool and pink stoma, large central scar  : joseph in place w yellow urine  Extremities: WWP, no clubbing, cyanosis, or edema  Skin: No rashes present  Neuro:  strength 3/5, able to move head around, lift shoulders, able to shake hand. CN 2-12 intact.    MEDICATIONS:  MEDICATIONS  (STANDING):  chlorhexidine 0.12% Liquid 15 milliLiter(s) Oral Mucosa every 12 hours  chlorhexidine 2% Cloths 1 Application(s) Topical <User Schedule>  dextrose 5%. 1000 milliLiter(s) (50 mL/Hr) IV Continuous <Continuous>  dextrose 50% Injectable 25 Gram(s) IV Push once  enoxaparin Injectable 40 milliGRAM(s) SubCutaneous every 24 hours  ertapenem  IVPB 1000 milliGRAM(s) IV Intermittent every 24 hours  insulin lispro (HumaLOG) corrective regimen sliding scale   SubCutaneous Before meals and at bedtime  magnesium sulfate  IVPB 4 Gram(s) IV Intermittent once  norepinephrine Infusion 0.05 MICROgram(s)/kG/Min (5.466 mL/Hr) IV Continuous <Continuous>  nystatin Powder 1 Application(s) Topical three times a day  pantoprazole   Suspension 40 milliGRAM(s) Oral daily  petrolatum Ophthalmic Ointment 1 Application(s) Both EYES three times a day  potassium chloride   Powder 40 milliEquivalent(s) Oral every 4 hours  propofol Infusion 5 MICROgram(s)/kG/Min (1.749 mL/Hr) IV Continuous <Continuous>  tamoxifen 20 milliGRAM(s) Oral two times a day  zinc oxide 20% Ointment 1 Application(s) Topical three times a day    MEDICATIONS  (PRN):  acetaminophen   Tablet .. 650 milliGRAM(s) Oral every 6 hours PRN Temp greater or equal to 38C (100.4F), Mild Pain (1 - 3)  glucagon  Injectable 1 milliGRAM(s) IntraMuscular once PRN Glucose LESS THAN 70 milligrams/deciliter  ondansetron Injectable 8 milliGRAM(s) IV Push every 8 hours PRN Nausea and/or Vomiting      ALLERGIES:  Allergies    No Known Allergies    Intolerances    IVIG PRODUCT IS PRIGIVEN (Unknown)      LABS:                        7.5    7.27  )-----------( 243      ( 09 May 2019 05:19 )             26.1     -    138  |  96  |  24<H>  ----------------------------<  103<H>  4.4   |  31  |  0.54    Ca    9.1      09 May 2019 05:19  Phos  2.3     -  Mg     1.2         TPro  7.8  /  Alb  2.6<L>  /  TBili  <0.2  /  DBili  x   /  AST  13  /  ALT  11  /  AlkPhos  74  05-    PT/INR - ( 08 May 2019 13:57 )   PT: 12.7 sec;   INR: 1.12          PTT - ( 08 May 2019 13:57 )  PTT:30.5 sec  Urinalysis Basic - ( 09 May 2019 02:06 )    Color: Yellow / Appearance: Hazy / S.010 / pH: x  Gluc: x / Ketone: 15 mg/dL  / Bili: Small / Urobili: 0.2 E.U./dL   Blood: x / Protein: 30 mg/dL / Nitrite: NEGATIVE   Leuk Esterase: NEGATIVE / RBC: 5-10 /HPF / WBC > 10 /HPF   Sq Epi: x / Non Sq Epi: 5-10 /HPF / Bacteria: Present /HPF        RADIOLOGY & ADDITIONAL TESTS: Reviewed.

## 2019-05-09 NOTE — PROGRESS NOTE ADULT - PROBLEM SELECTOR PLAN 3
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

## 2019-05-09 NOTE — PROGRESS NOTE ADULT - SUBJECTIVE AND OBJECTIVE BOX
NEUROLOGY CONSULT PROGRESS NOTE    INTERVAL HISTORY:  Patient intubated and transferred to the MICU. Received Day 2 of IVIG.     REVIEW OF SYSTEMS:  Unable to obtain fully. Nods yes when asked if she feels stronger.     MEDICATIONS:  acetaminophen   Tablet .. 650 milliGRAM(s) Oral every 6 hours PRN  chlorhexidine 0.12% Liquid 15 milliLiter(s) Oral Mucosa every 12 hours  chlorhexidine 2% Cloths 1 Application(s) Topical <User Schedule>  dextrose 5%. 1000 milliLiter(s) IV Continuous <Continuous>  dextrose 50% Injectable 25 Gram(s) IV Push once  enoxaparin Injectable 40 milliGRAM(s) SubCutaneous every 24 hours  ertapenem  IVPB 1000 milliGRAM(s) IV Intermittent every 24 hours  glucagon  Injectable 1 milliGRAM(s) IntraMuscular once PRN  insulin lispro (HumaLOG) corrective regimen sliding scale   SubCutaneous every 6 hours  nystatin Powder 1 Application(s) Topical three times a day  pantoprazole   Suspension 40 milliGRAM(s) Oral daily  petrolatum Ophthalmic Ointment 1 Application(s) Both EYES three times a day  tamoxifen 20 milliGRAM(s) Oral two times a day  zinc oxide 20% Ointment 1 Application(s) Topical three times a day    VITAL SIGNS:  Vital Signs Last 24 Hrs  T(C): 37.6 (09 May 2019 09:00), Max: 38.6 (08 May 2019 15:40)  T(F): 99.7 (09 May 2019 09:00), Max: 101.4 (08 May 2019 15:40)  HR: 74 (09 May 2019 11:00) (66 - 104)  BP: 96/47 (09 May 2019 11:00) (80/59 - 121/59)  BP(mean): 67 (09 May 2019 11:00) (48 - 101)  RR: 19 (09 May 2019 11:00) (9 - 35)  SpO2: 100% (09 May 2019 11:00) (87% - 100%)    PHYSICAL EXAMINATION:  Constitutional: Frail middle age female, intubated  Eyes: Conjunctiva and sclera clear  Neurologic:  - Mental Status:  Awake and able to follow commands as directed  - Cranial Nerves II-XII:    II: Pupils are equal, round, and reactive to light.  III, IV, VI:  EOM movement improved from yesterday   V:  Facial sensation is intact  VII:  Weak facial muscles but with normal eye closure   VIII:  Hearing is intact to finger rub.  XI:  Weak shoulder shrug, now able to turn head w/o assistance   - Motor:  Flaccid muscles. Hypotrophic muscles. Upper extremities R arm 4-/5, R  4/5, L arm 4+/5 Lower extremities Leg 2-/5 bilaterally, Ankle 0/5  - Reflexes:  0/5 reflexes at biceps, triceps, brachioradialis, knees, and ankles. No babinski response.   - Sensory:  Intact to light touch  - Gait: Deferred     LABS:               7.5    7.27  )-----------( 243      ( 09 May 2019 05:19 )             26.1         138  |  96  |  24<H>  ----------------------------<  103<H>  4.4   |  31  |  0.54    Ca    9.1      09 May 2019 05:19  Phos  2.3       Mg     1.2         TPro  7.8  /  Alb  2.6<L>  /  TBili  <0.2  /  DBili  x   /  AST  13  /  ALT  11  /  AlkPhos  74      PT/INR - ( 08 May 2019 13:57 )   PT: 12.7 sec;   INR: 1.12          PTT - ( 08 May 2019 13:57 )  PTT:30.5 sec  Urinalysis Basic - ( 09 May 2019 02:06 )    Color: Yellow / Appearance: Hazy / S.010 / pH: x  Gluc: x / Ketone: 15 mg/dL  / Bili: Small / Urobili: 0.2 E.U./dL   Blood: x / Protein: 30 mg/dL / Nitrite: NEGATIVE   Leuk Esterase: NEGATIVE / RBC: 5-10 /HPF / WBC > 10 /HPF   Sq Epi: x / Non Sq Epi: 5-10 /HPF / Bacteria: Present /HPF      RADIOLOGY & ADDITIONAL STUDIES:      Xray Chest 1 View- PORTABLE-Urgent (19 @ 05:24) >  FINDINGS: Kyphotic projection. Right internal jugular venous line with   tip overlying the SVC. No visualized pleural effusion. No pneumothorax.   Thoracic dextroscoliosis. Interval resolution of the right upper lobe   collapse. Subsegmental atelectasis bilateral bases. Size of cardiac   silhouette cannot be accurately assessed on this study.    IMPRESSION:  Interval resolution of the prior right upper lobe collapse

## 2019-05-09 NOTE — PROGRESS NOTE ADULT - PROBLEM SELECTOR PLAN 8
Family meeting is planned via GYN-ONC team, and possibly critical care team +/- neurology later this afternoon to discuss ongoing medical problems  - will determine if palliative SW to be present at meeting today however otherwise her care plan remains unchanged at present, as described above

## 2019-05-09 NOTE — PROGRESS NOTE ADULT - ASSESSMENT
53yo F w/ known stage IV endometrial cancer, likely with progression of disease, complex fistulae, chronic indwelling [place] admitted with fever and urosepsis originally requiring MICU admission for septic shock and respiratory failure which improved; however course c/b progressive ascending weakness while on medical telemetry/SDU despite initiation of IVIG, which was further c/b acute hypercapnic respiratory failure requiring reintubation and readmission to MICU for further management. Palliative following for symptom management, patient/family support, and goals of care.

## 2019-05-09 NOTE — PROGRESS NOTE ADULT - PROBLEM SELECTOR PLAN 4
Per neurology, whether or not PPS is present should not be affecting her ascending/upper extremity weakness and respiratory insufficiency and is better explained by AIDP vs. CIDP  - mgmt otherwise as above in problem #2 and #3

## 2019-05-09 NOTE — PROGRESS NOTE ADULT - SUBJECTIVE AND OBJECTIVE BOX
TIM MCDANIEL             [id number]    CC:  weakness, GOC    HPI:  60 yo G0 w/ known stage IV endometrial carcinoma s/p staging surgery 7/2018 and 1 cycle of chemotherapy in 2/2019 followed by multiple admissions for management of symptomatic enterovaginal fistula, complex fistula associated urinary tract infection, bacteremia presents on referral from [facility] after fever today of 100.7. Pt reports poor PO intake, nausea w/ dry heaving and bringing up phlegm for the past 2 days. She also reports continued weakness. She denies chills, HA, dizziness, CP, palpitations, SOB, abdominal pain, vaginal bleeding, leakage of stool per vagina, abnormal vaginal discharge. She states she was not ambulating daily at [facility] however was working with PT there.    OB/GYN Hx: G0, stage 4 endometrial cancer dx in 7/2018; h/o breast cancer in 2009 s/p chemo and radiation with left breast lumpectomy  PMHx: T2DM, polio in childhood, h/o breast cancer  SHx: left breast lumpectomy, right knee surgery with screw placement 2001, 7/2018 exploratory laparotomy, enterolysis, SHAMIR, BSO, pelvic lymphadenectomy, low anterior resection mobilization of splenic flexure, end colostomy   Allergies: NKDA (29 Apr 2019 23:11)    SUBJECTIVE:  Endorses doing better today. Weakness has improved despite being on the ventilator and was able to sleep overnight. Demonstrates being able to move her arms and hands better, turning head/lifting off bed. Expresses apprehension about remaining intubated but understands the need for it currently and agrees that taking it out prematurely and needing a third intubation is less desirable than maintaining the current tube while she regains her strength. She complains about pain at the IV site, particularly on her R hand/wrist, in context of receiving IVIG describing that it burns and causes a lot of itching. She otherwise endorses being comfortable and in better spirits today.    ROS:  DYSPNEA: Y (intubated, on CPAP)  NAUS/VOM: N  SECRETIONS: minimal, around ETT - declined suctioning during interview  AGITATION: N  Pain (Y/N): Yes - discomfort around IV sites especially during IVIG infusions  -Provocation/Palliation: IVIG infusion or flexion of wrist  -Quality/Quantity: burning, itching  -Radiating: no  -Severity: 3/10  -Timing/Frequency: intermittent; worse during infusion  -Impact on ADLs: not applicable given current critical illness    OTHER REVIEW OF SYSTEMS: neg.  UNABLE TO OBTAIN  due to:    PEx:  T(C): 37.6 (05-09-19 @ 09:00), Max: 38.6 (05-08-19 @ 15:40)  HR: 76 (05-09-19 @ 13:00) (66 - 100)  BP: 84/44 (05-09-19 @ 13:00) (80/59 - 114/73)  RR: 12 (05-09-19 @ 13:00) (9 - 35)  SpO2: 100% (05-09-19 @ 13:00) (87% - 100%)  Wt(kg): --    General: frail female intubated and resting comfortably in bed; appearing older than stated age, pale  HEENT: moderate alopecia; conjunctival pallor; hypophonic speech, moist mucous membranes with scant secretions  Neck: supple  CVS: S1/S2, RRR  Resp: intubated, on CPAP at time of examination w/ pressure support of 10cm H2O; breathing appears comfortable  GI: soft, non-tender  Musc: hypotrophic muscularity and bulk, particularly of UE/hands, LE; profound kyphoscoliosis muscle wasting of left trapezius and latissimus dorsi  Neuro: awake and alert, conversive through hand gestures, lip gestures and limited writing with marker on paper at bedside  Psych: flat, depressed affect; pleasant  Skin: intact       	   ALLERGIES: IVIG PRODUCT IS PRIGIVEN OR GAMMUNEX (Unknown)  No Known Allergies    OPIATE NAÏVE (Y/N): yes on admission; has received opiates earlier in hospital stay    MEDICATIONS: REVIEWED  MEDICATIONS  (STANDING):  chlorhexidine 0.12% Liquid 15 milliLiter(s) Oral Mucosa every 12 hours  chlorhexidine 2% Cloths 1 Application(s) Topical <User Schedule>  dextrose 5%. 1000 milliLiter(s) (50 mL/Hr) IV Continuous <Continuous>  dextrose 50% Injectable 25 Gram(s) IV Push once  enoxaparin Injectable 40 milliGRAM(s) SubCutaneous every 24 hours  ertapenem  IVPB 1000 milliGRAM(s) IV Intermittent every 24 hours  insulin lispro (HumaLOG) corrective regimen sliding scale   SubCutaneous every 6 hours  nystatin Powder 1 Application(s) Topical three times a day  pantoprazole   Suspension 40 milliGRAM(s) Oral daily  petrolatum Ophthalmic Ointment 1 Application(s) Both EYES three times a day  tamoxifen 20 milliGRAM(s) Oral two times a day  zinc oxide 20% Ointment 1 Application(s) Topical three times a day    MEDICATIONS  (PRN):  acetaminophen   Tablet .. 650 milliGRAM(s) Oral every 6 hours PRN Temp greater or equal to 38C (100.4F), Mild Pain (1 - 3)  glucagon  Injectable 1 milliGRAM(s) IntraMuscular once PRN Glucose LESS THAN 70 milligrams/deciliter      LABS: REVIEWED  CBC:                        7.5    7.27  )-----------( 243      ( 09 May 2019 05:19 )             26.1     CMP:    05-09    138  |  96  |  24<H>  ----------------------------<  103<H>  4.4   |  31  |  0.54    Ca    9.1      09 May 2019 05:19  Phos  2.3     05-09  Mg     1.2     05-09    TPro  7.8  /  Alb  2.6<L>  /  TBili  <0.2  /  DBili  x   /  AST  13  /  ALT  11  /  AlkPhos  74  05-09      IMAGING: REVIEWED    ADVANCED DIRECTIVES:     FULL CODE    DECISION MAKER: patient/self  LEGAL SURROGATE: Esha Avila (sister) [phone]    PSYCHOSOCIAL-SPIRITUAL ASSESSMENT:       Reviewed       Care plan unchanged    GOALS OF CARE DISCUSSION       Palliative care info/counseling provided	           Family meeting       Advanced Directives addressed please see Advance Care Planning Note	           See previous Palliative Medicine Note       Documentation of GOC: FULL CODE  	      AGENCY CHOICE DISCUSSED:          Not appropriate for discussion at present time    REFERRALS	        Palliative Med        Unit SW/Case Mgmt        - declined       Speech/Swallow - not applicable currently while intubated       Patient/Family Support       Massage Therapy       Music Therapy       Hospice - not applicable at present       Nutrition       PT/OT TIM MCDANIEL             [id number]    CC:  weakness, GOC    HPI:  58 yo G0 w/ known stage IV endometrial carcinoma s/p staging surgery 7/2018 and 1 cycle of chemotherapy in 2/2019 followed by multiple admissions for management of symptomatic enterovaginal fistula, complex fistula associated urinary tract infection, bacteremia presents on referral from [facility] after fever today of 100.7. Pt reports poor PO intake, nausea w/ dry heaving and bringing up phlegm for the past 2 days. She also reports continued weakness. She denies chills, HA, dizziness, CP, palpitations, SOB, abdominal pain, vaginal bleeding, leakage of stool per vagina, abnormal vaginal discharge. She states she was not ambulating daily at [facility] however was working with PT there.    OB/GYN Hx: G0, stage 4 endometrial cancer dx in 7/2018; h/o breast cancer in 2009 s/p chemo and radiation with left breast lumpectomy  PMHx: T2DM, polio in childhood, h/o breast cancer  SHx: left breast lumpectomy, right knee surgery with screw placement 2001, 7/2018 exploratory laparotomy, enterolysis, SHAMIR, BSO, pelvic lymphadenectomy, low anterior resection mobilization of splenic flexure, end colostomy   Allergies: NKDA (29 Apr 2019 23:11)    SUBJECTIVE:  Endorses doing better today. Weakness has improved despite being on the ventilator and was able to sleep overnight. Demonstrates being able to move her arms and hands better, turning head/lifting off bed. Expresses apprehension about remaining intubated but understands the need for it currently and agrees that taking it out prematurely and needing a third intubation is less desirable than maintaining the current tube while she regains her strength. She complains about pain at the IV site, particularly on her R hand/wrist, in context of receiving IVIG describing that it burns and causes a lot of itching. She otherwise endorses being comfortable and in better spirits today.  Sister-in-law, niece and friend at the bedside    ROS:  DYSPNEA: Y (intubated, on CPAP)  NAUS/VOM: N  SECRETIONS: minimal, around ETT - declined suctioning during interview  AGITATION: N  Pain (Y/N): Yes - discomfort around IV sites especially during IVIG infusions  -Provocation/Palliation: IVIG infusion or flexion of wrist  -Quality/Quantity: burning, itching  -Radiating: no  -Severity: 3/10  -Timing/Frequency: intermittent; worse during infusion  -Impact on ADLs: not applicable given current critical illness    OTHER REVIEW OF SYSTEMS: neg.  UNABLE TO OBTAIN  due to:    PEx:  T(C): 37.6 (05-09-19 @ 09:00), Max: 38.6 (05-08-19 @ 15:40)  HR: 76 (05-09-19 @ 13:00) (66 - 100)  BP: 84/44 (05-09-19 @ 13:00) (80/59 - 114/73)  RR: 12 (05-09-19 @ 13:00) (9 - 35)  SpO2: 100% (05-09-19 @ 13:00) (87% - 100%)  Wt(kg): --    General: frail female intubated and resting comfortably in bed; appearing older than stated age, pale  HEENT: moderate alopecia; conjunctival pallor; hypophonic speech, moist mucous membranes with scant secretions  Neck: supple  CVS: S1/S2, RRR  Resp: intubated, on CPAP at time of examination w/ pressure support of 10cm H2O; breathing appears comfortable  GI: soft, non-tender  Musc: hypotrophic muscularity and bulk, particularly of UE/hands, LE; profound kyphoscoliosis muscle wasting of left trapezius and latissimus dorsi  Neuro: awake and alert, conversive through hand gestures, lip gestures and limited writing with marker on paper at bedside  Psych: flat, depressed affect; pleasant  Skin: intact       	   ALLERGIES: IVIG PRODUCT IS PRIGIVEN OR GAMMUNEX (Unknown)  No Known Allergies    OPIATE NAÏVE (Y/N): yes on admission; has received opiates earlier in hospital stay    MEDICATIONS: REVIEWED  MEDICATIONS  (STANDING):  chlorhexidine 0.12% Liquid 15 milliLiter(s) Oral Mucosa every 12 hours  chlorhexidine 2% Cloths 1 Application(s) Topical <User Schedule>  dextrose 5%. 1000 milliLiter(s) (50 mL/Hr) IV Continuous <Continuous>  dextrose 50% Injectable 25 Gram(s) IV Push once  enoxaparin Injectable 40 milliGRAM(s) SubCutaneous every 24 hours  ertapenem  IVPB 1000 milliGRAM(s) IV Intermittent every 24 hours  insulin lispro (HumaLOG) corrective regimen sliding scale   SubCutaneous every 6 hours  nystatin Powder 1 Application(s) Topical three times a day  pantoprazole   Suspension 40 milliGRAM(s) Oral daily  petrolatum Ophthalmic Ointment 1 Application(s) Both EYES three times a day  tamoxifen 20 milliGRAM(s) Oral two times a day  zinc oxide 20% Ointment 1 Application(s) Topical three times a day    MEDICATIONS  (PRN):  acetaminophen   Tablet .. 650 milliGRAM(s) Oral every 6 hours PRN Temp greater or equal to 38C (100.4F), Mild Pain (1 - 3)  glucagon  Injectable 1 milliGRAM(s) IntraMuscular once PRN Glucose LESS THAN 70 milligrams/deciliter      LABS: REVIEWED  CBC:                        7.5    7.27  )-----------( 243      ( 09 May 2019 05:19 )             26.1     CMP:    05-09    138  |  96  |  24<H>  ----------------------------<  103<H>  4.4   |  31  |  0.54    Ca    9.1      09 May 2019 05:19  Phos  2.3     05-09  Mg     1.2     05-09    TPro  7.8  /  Alb  2.6<L>  /  TBili  <0.2  /  DBili  x   /  AST  13  /  ALT  11  /  AlkPhos  74  05-09      IMAGING: REVIEWED    ADVANCED DIRECTIVES:     FULL CODE    DECISION MAKER: patient/self  LEGAL SURROGATE: Esha Avila (sister) [phone]    PSYCHOSOCIAL-SPIRITUAL ASSESSMENT:       Reviewed       Care plan unchanged    GOALS OF CARE DISCUSSION       Palliative care info/counseling provided	           Family meeting       Advanced Directives addressed please see Advance Care Planning Note	           See previous Palliative Medicine Note       Documentation of GOC: FULL CODE  	      AGENCY CHOICE DISCUSSED:          Not appropriate for discussion at present time    REFERRALS	        Palliative Med        Unit SW/Case Mgmt        - declined       Speech/Swallow - not applicable currently while intubated       Patient/Family Support       Massage Therapy       Music Therapy       Hospice - not applicable at present       Nutrition       PT/OT

## 2019-05-09 NOTE — PROGRESS NOTE ADULT - ASSESSMENT
58yo F PMHx polio, breast cancer, DM, Endometrial Cancer s/p exploratory laparotomy, enterolysis, SHAMIR, BSO, pelvic lymphadenectomy, low anterior resection mobilization of splenic flexure, end colostomy on 7/30/18, rectovaginal fistula, numerous admissions for urinary tract infection presented to [facility] in the setting of urosepsis. Hospital course complicated by blood stream infection (currently on ertapenem) and respiratory failure requiring intubation s/p extubation on 5/2. Now with worsening respiratory status due to likely neurological process (CIPD vs   vs paraneoplastic vs AIDP?) intubated for CO2 narcosis and stepped up to MICU.     CARDIOVASCULAR  #hemodynamics  Currently requiring levophed 2/2 to sedation  - making appropriate amounts of urine, mentating well despite light sedation  - extremities cool to touch. 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)  - ABG w CO2 102 on am of 5/8, and patient lethargic  - continue w intubation and ventilation as patient w respiratory compromise due to peripheral neurologic process  - Continue treatment w IVIG per neurology  - monitor ABGs      #hx of urinary retention  - passed TOV on 5/7 now w permacath  - will need daily bladder scans to ensure not retaining  - might need SP catheter  - appreciate urology recs    Neurology  #Acute polyneuropathy  - patient w likely AIDP leading to respiratory compromise  - previous EMG w demyelnating polyneuropathy  - recs per neurology  - trial of IVIG on 5/7 and 5/8 to determine further treatment  - f/u results of LP studies      INFECTIOUS DISEASE  #hx of recurrent ESBL ecoli UTI and bacteremia  - previously on admission patient admitted to MICU in septic shock 2/2 to ESBL ecoli bactermia and UTI  - continue ertepenem daily per ID recs    HEME/ONC  #Stage IV endometrial adenocarcinoma  per GYN-ONC w/ interval increase in tumor burden. Pt was treated with Anastrazole and initiated 3 weeks of megace 80mg BID followed by 3 weeks of tamoxifen. started megace and metformin 4/10, now on Tamoxifen for 21 days (5/1/19-).    FLUIDS/ELECTROLYTES/NUTRITION  -IVF: none  -Monitor, Replete to K>4 and Mg>2  -Diet: NPO  PROPHYLAXIS  -DVT: lovenox and SCDs  -GI: protonix    DISPO: MICU  CODE STATUS: full code 58yo F PMHx polio, breast cancer, DM, Endometrial Cancer s/p exploratory laparotomy, enterolysis, SHAMIR, BSO, pelvic lymphadenectomy, low anterior resection mobilization of splenic flexure, end colostomy on 7/30/18, rectovaginal fistula, numerous admissions for urinary tract infection presented to [facility] in the setting of urosepsis. Hospital course complicated by blood stream infection (currently on ertapenem) and respiratory failure requiring intubation s/p extubation on 5/2. Now with worsening respiratory status due to likely neurological process (CIPD vs   vs paraneoplastic vs AIDP?) intubated for CO2 narcosis and stepped up to MICU, now improving s/p IVIG.    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)  - ABG w CO2 102 on am of 5/8, and patient lethargic  - continue w intubation and ventilation as patient w respiratory compromise due to peripheral neurologic process, however patient improving and able to take spontaneous breaths  - tolerating CPAP well w good inspiratory effort, tidal volumes, and respiratory rate  - Continue treatment w IVIG per neurology for two more doses  - monitor ABGs - hypercapnea improved      #hx of urinary retention  - retaining on 5/8, so placed joseph  - might need SP catheter  - appreciate urology recs    Neurology  #Acute polyneuropathy  - improving  - patient w likely AIDP leading to respiratory compromise  - previous EMG w demyelinating polyneuropathy  - recs per neurology  - trial of IVIG on 5/7 and 5/8 to determine further treatment - good response so will give two more doses  - f/u results of LP studies      INFECTIOUS DISEASE  #hx of recurrent ESBL ecoli UTI and bacteremia  - previously on admission patient admitted to MICU in septic shock 2/2 to ESBL ecoli bactermia and UTI  - continue ertepenem daily per ID recs    HEME/ONC  #Stage IV endometrial adenocarcinoma  per GYN-ONC w/ interval increase in tumor burden. Pt was treated with Anastrazole and initiated 3 weeks of megace 80mg BID followed by 3 weeks of tamoxifen. started megace and metformin 4/10, was on Tamoxifen for 21 days (5/1/19-5/8/19), however now w NG tube so cannot take bc cannot be crushed    FLUIDS/ELECTROLYTES/NUTRITION  -IVF: NS at 90cc/hr  -Monitor, Replete to K>4 and Mg>2  -Diet: nutrition consult for tube feeds    PROPHYLAXIS  -DVT: lovenox and SCDs  -GI: protonix    DISPO: MICU  CODE STATUS: full code; palliative following regarding GOC

## 2019-05-09 NOTE — PROGRESS NOTE ADULT - ASSESSMENT
58yo F HD11 with stage IV endometrial adenocarcinoma s/p staging surgery '18 and 1 round of chemotherapy 2/19 readmitted from [facility] with fever, previously admitted for management of symptomatic rectovaginal and enterovaginal fistulas. Presents on referral from [facility] with urosepsis.  Rapid response called due to worsening respiratory status s/p intubation and MICU admission. She then was on regional GYN service however had respiratory weakness and was transferred to Medicine/tele HD9 until requiring intubation again HD10 and is now in the ICU.    1. Neuro: re-intuabated. CIDP vs post polio vs paraneoplastic contributing to weakness.IVIG 2 days (5/8), could cause fevers. Follow up neuro recs. f/u labs. LP 5/8.   2. Pulm: intubated  3. Cardio: sedated, on pressor suppot  4. FEN/GI: dobhoff  5. : joseph replaced 5/8 after found to have overflow incontinence  6. ID: IV ertapenem until 5/14, s/p meropenem, s/p vanc  7. Endocrine: FS, ISS, Metformin 1000mg BID  8. VTE prophylaxis - SCDs, Lovenox 40mg daily   9. GYN malignancy  -stopped anastrazole and initiated 3 weeks of megace 80mg BID followed by 3 weeks of tamoxifen. started megace and metformin 4/10, now on Tamoxifen for 21 days (5/1/19-  not taking pills at this time  10. Derm: monitor sacrum for s/s of pressure ulcer. now stage 2.  11. Social work/palliative: spoke with palliative care team this AM. plan to have a meeting with gyn onc, palliative care and patient's family today around 3pm, sooner if needed. code status to be addressed. long term plan to be addressed.

## 2019-05-09 NOTE — PROGRESS NOTE ADULT - PROBLEM SELECTOR PLAN 6
stage IV endometrial adenocarcinoma; per GYN-ONC w/ interval increase in tumor burden  - management per GYN-ONC  - not currently on chemotherapy secondary to performance status and active infection  - patient had demonstrated poor understanding of current malignancy status earlier in admission and we had discussed this with her. Although patient has persistent and enlarging tumor- tumor burden is not great, but does require treatment based on conversations with GYN-ONC and Dr. Gonzalez.

## 2019-05-09 NOTE — PROGRESS NOTE ADULT - PROBLEM SELECTOR PLAN 2
Suspected per discussion w/ neurology based on EMG data; now s/p IVIG initiation and had interval worsening requiring intubation on 5/8  - per neurology and primary team planned for 4 total days of IVIG however depending on availability of [facility] may ultimately need plasmapheresis  - appreciate neurology input and management recs  - mgmt otherwise as below in problem #4  - prognosis is guarded

## 2019-05-09 NOTE — PROGRESS NOTE ADULT - PROBLEM SELECTOR PLAN 7
rectovaginal and enterovaginal fistulas developed secondary to problem #6 and staging surgery in Fall 2018  -  evaluated and discussed option of supra-pubic catheter that may decrease, but not eliminate her risk/propensity for recurrent urinary infections. Management in this respect has been deferred 2/2 her more acute problems as described above  - joseph had been removed earlier in week but was replaced yesterday when re-admitted to MICU

## 2019-05-09 NOTE — PROGRESS NOTE ADULT - PROBLEM SELECTOR PLAN 5
Originally presented w/ septic shock of  source w/ bacteremia requiring pressor support and intubation -- shock now resolved. Cleared blood cultures on ABx.  - continue antimicrobial therapy per ID recs; per ID will need ABx through May 14  - management per primary team

## 2019-05-09 NOTE — PROGRESS NOTE ADULT - SUBJECTIVE AND OBJECTIVE BOX
GYN Progress Note    Patient seen at bedside.  intubated/sedated but responsive.  had fevers overnight, cultures sent.  could get fevers from IVIG which she was on.  LP done yesterday with multiple studies/labs sent, pending.  required pressors overnight.  low UOP picked up with bolus and maintenance fluid.  of note, stage 2 sacral ulcer documented.    Vital Signs Last 24 Hrs  T(C): 36.6 (09 May 2019 04:38), Max: 38.6 (08 May 2019 15:40)  T(F): 97.8 (09 May 2019 04:38), Max: 101.4 (08 May 2019 15:40)  HR: 88 (09 May 2019 06:54) (66 - 112)  BP: 95/56 (09 May 2019 05:00) (80/59 - 130/76)  BP(mean): 67 (09 May 2019 05:00) (48 - 101)  RR: 9 (09 May 2019 05:00) (9 - 32)  SpO2: 94% (09 May 2019 06:54) (87% - 100%)      I&O's Summary    08 May 2019 07:01  -  09 May 2019 07:00  --------------------------------------------------------  IN: 1661.1 mL / OUT: 1246 mL / NET: 415.1 mL      MEDICATIONS  (STANDING):  chlorhexidine 0.12% Liquid 15 milliLiter(s) Oral Mucosa every 12 hours  chlorhexidine 2% Cloths 1 Application(s) Topical <User Schedule>  dextrose 5%. 1000 milliLiter(s) (50 mL/Hr) IV Continuous <Continuous>  dextrose 50% Injectable 25 Gram(s) IV Push once  enoxaparin Injectable 40 milliGRAM(s) SubCutaneous every 24 hours  ertapenem  IVPB 1000 milliGRAM(s) IV Intermittent every 24 hours  insulin lispro (HumaLOG) corrective regimen sliding scale   SubCutaneous Before meals and at bedtime  magnesium sulfate  IVPB 4 Gram(s) IV Intermittent once  norepinephrine Infusion 0.05 MICROgram(s)/kG/Min (5.466 mL/Hr) IV Continuous <Continuous>  nystatin Powder 1 Application(s) Topical three times a day  pantoprazole   Suspension 40 milliGRAM(s) Oral daily  petrolatum Ophthalmic Ointment 1 Application(s) Both EYES three times a day  propofol Infusion 5 MICROgram(s)/kG/Min (1.749 mL/Hr) IV Continuous <Continuous>  tamoxifen 20 milliGRAM(s) Oral two times a day  zinc oxide 20% Ointment 1 Application(s) Topical three times a day    MEDICATIONS  (PRN):  acetaminophen   Tablet .. 650 milliGRAM(s) Oral every 6 hours PRN Temp greater or equal to 38C (100.4F), Mild Pain (1 - 3)  glucagon  Injectable 1 milliGRAM(s) IntraMuscular once PRN Glucose LESS THAN 70 milligrams/deciliter  ondansetron Injectable 8 milliGRAM(s) IV Push every 8 hours PRN Nausea and/or Vomiting      LABS:                        7.5    7.27  )-----------( 243      ( 09 May 2019 05:19 )             26.1         138  |  96  |  24<H>  ----------------------------<  103<H>  4.4   |  31  |  0.54    Ca    9.1      09 May 2019 05:19  Phos  2.3       Mg     1.2         TPro  7.8  /  Alb  2.6<L>  /  TBili  <0.2  /  DBili  x   /  AST  13  /  ALT  11  /  AlkPhos  74  -    PT/INR - ( 08 May 2019 13:57 )   PT: 12.7 sec;   INR: 1.12          PTT - ( 08 May 2019 13:57 )  PTT:30.5 sec  Urinalysis Basic - ( 09 May 2019 02:06 )    Color: Yellow / Appearance: Hazy / S.010 / pH: x  Gluc: x / Ketone: 15 mg/dL  / Bili: Small / Urobili: 0.2 E.U./dL   Blood: x / Protein: 30 mg/dL / Nitrite: NEGATIVE   Leuk Esterase: NEGATIVE / RBC: 5-10 /HPF / WBC > 10 /HPF   Sq Epi: x / Non Sq Epi: 5-10 /HPF / Bacteria: Present /HPF

## 2019-05-09 NOTE — PROGRESS NOTE ADULT - ASSESSMENT
58yo F PMHx polio, breast cancer, DM, Endometrial Cancer s/p exploratory laparotomy, enterolysis, SHAMIR, BSO, pelvic lymphadenectomy, low anterior resection mobilization of splenic flexure, end colostomy on 7/30/18, rectovaginal fistula, numerous admissions for urinary tract infection presented to [facility] in the setting of urosepsis. Hospital course complicated by blood stream infection (currently on ertapenem) and respiratory failure requiring intubation s/p extubation on 5/2. Neurology consulted for worsening lower extremity weakness. On prior admission EMG raises suspicion for CIDP-spectrum disorder. Per collateral information patient became noticeably weak mala in her hands for approx 1 week prior to being diagnosed with IDP in April, suggesting a more acute process. 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     - s/p two days of IVIG 500mg/kg on 5/7/2019, will need a total for 4 days   - Currently there is an Privigen IVIG shortage, attempting to contact other hospitals   - Given A blood type, will need anti A antibody depleted formulation - please order Privigen IVIG formulation (not Gammagard) to avoid possible effect of hemolytic anemia  - if unable to get IVIG will need plasmapheresis  - f/u on MG antibodies: Acetylcholine Blocking AB, Acetylcholine Modulating AB, Acetylcholine Receptor Binding Antibody, MuSK Antibody Test  - LP showing -   - MR brain w and wo con & MR cervical spine w and wo con    Neurology will follow. 60yo F PMHx polio, breast cancer, DM, Endometrial Cancer s/p exploratory laparotomy, enterolysis, SHAMIR, BSO, pelvic lymphadenectomy, low anterior resection mobilization of splenic flexure, end colostomy on 7/30/18, rectovaginal fistula, numerous admissions for urinary tract infection presented to [facility] in the setting of urosepsis. Hospital course complicated by blood stream infection (currently on ertapenem) and respiratory failure requiring intubation s/p extubation on 5/2. Neurology consulted for worsening lower extremity weakness. On prior admission EMG raises suspicion for CIDP-spectrum disorder. Per collateral information patient became noticeably weak mala in her hands for approx 1 week prior to being diagnosed with IDP in April, suggesting a more acute process. 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     - s/p two days of IVIG 500mg/kg on 5/7/2019, will need a total for 4 days   - Currently there is an Privigen IVIG shortage, attempting to contact other hospitals   - Given A blood type, will need anti A antibody depleted formulation - please order Privigen IVIG formulation (not Gammagard) to avoid possible effect of hemolytic anemia  - if unable to get IVIG will need plasmapheresis  - f/u on MG antibodies: Acetylcholine Blocking AB, Acetylcholine Modulating AB, Acetylcholine Receptor Binding Antibody, MuSK Antibody Test  - MR brain w and wo con & MR cervical spine w and wo con    Neurology will follow.

## 2019-05-09 NOTE — PROGRESS NOTE ADULT - SUBJECTIVE AND OBJECTIVE BOX
Since yesterday, pt was intubated and tx'd to ICU.  Received 2nd dose Privigen with which she has shown substantial improvement.  Stronger eye closure, EOM with more full range of motion, 4/5 in the arms, 3/5 in the R leg and 2/5 in L leg. Mental status intact.      Labwork notable for downtrending H/H.  no leukocytosis    CSF with wbc 6, normal protein, otherwise unremarkable so far    Acute facial and limb weakness and respiratory failure most c/w AIDP although normal protein is atypical  -cont Privigen 0.5g/kg daily for total 4 days  -MR brain and C spine w and wo con when stable

## 2019-05-10 LAB
ANION GAP SERPL CALC-SCNC: 10 MMOL/L — SIGNIFICANT CHANGE UP (ref 5–17)
BASE EXCESS BLDA CALC-SCNC: -0.1 MMOL/L — SIGNIFICANT CHANGE UP (ref -2–3)
BASE EXCESS BLDA CALC-SCNC: -0.6 MMOL/L — SIGNIFICANT CHANGE UP (ref -2–3)
BUN SERPL-MCNC: 22 MG/DL — SIGNIFICANT CHANGE UP (ref 7–23)
CALCIUM SERPL-MCNC: 8.8 MG/DL — SIGNIFICANT CHANGE UP (ref 8.4–10.5)
CHLORIDE SERPL-SCNC: 101 MMOL/L — SIGNIFICANT CHANGE UP (ref 96–108)
CMV DNA # UR NAA DL=200: SIGNIFICANT CHANGE UP IU/ML
CO2 SERPL-SCNC: 25 MMOL/L — SIGNIFICANT CHANGE UP (ref 22–31)
CREAT SERPL-MCNC: 0.48 MG/DL — LOW (ref 0.5–1.3)
CULTURE RESULTS: SIGNIFICANT CHANGE UP
EBV PCR: SIGNIFICANT CHANGE UP IU/ML
GAS PNL BLDA: SIGNIFICANT CHANGE UP
GD1A GANGL IGG+IGM SER IA-ACNC: 17 IV — SIGNIFICANT CHANGE UP (ref 0–50)
GD1B GANGL IGG+IGM SER IA-ACNC: 15 IV — SIGNIFICANT CHANGE UP (ref 0–50)
GLUCOSE BLDC GLUCOMTR-MCNC: 105 MG/DL — HIGH (ref 70–99)
GLUCOSE BLDC GLUCOMTR-MCNC: 119 MG/DL — HIGH (ref 70–99)
GLUCOSE BLDC GLUCOMTR-MCNC: 133 MG/DL — HIGH (ref 70–99)
GLUCOSE BLDC GLUCOMTR-MCNC: 149 MG/DL — HIGH (ref 70–99)
GLUCOSE BLDC GLUCOMTR-MCNC: 186 MG/DL — HIGH (ref 70–99)
GLUCOSE SERPL-MCNC: 186 MG/DL — HIGH (ref 70–99)
GM1 ASIALO IGG+IGM SER IA-ACNC: 11 IV — SIGNIFICANT CHANGE UP (ref 0–50)
GM1 GANGL IGG+IGM SER-ACNC: 13 IV — SIGNIFICANT CHANGE UP (ref 0–50)
GM2 GANGL IGG+IGM SER IA-ACNC: 10 IV — SIGNIFICANT CHANGE UP (ref 0–50)
GQ1B GANGL IGG+IGM SER IA-ACNC: 7 IV — SIGNIFICANT CHANGE UP (ref 0–50)
HCO3 BLDA-SCNC: 27 MMOL/L — SIGNIFICANT CHANGE UP (ref 21–28)
HCO3 BLDA-SCNC: 27 MMOL/L — SIGNIFICANT CHANGE UP (ref 21–28)
HCT VFR BLD CALC: 23 % — LOW (ref 34.5–45)
HGB BLD-MCNC: 7.2 G/DL — LOW (ref 11.5–15.5)
MAGNESIUM SERPL-MCNC: 2.1 MG/DL — SIGNIFICANT CHANGE UP (ref 1.6–2.6)
MCHC RBC-ENTMCNC: 30.4 PG — SIGNIFICANT CHANGE UP (ref 27–34)
MCHC RBC-ENTMCNC: 31.3 GM/DL — LOW (ref 32–36)
MCV RBC AUTO: 97 FL — SIGNIFICANT CHANGE UP (ref 80–100)
NON-GYNECOLOGICAL CYTOLOGY STUDY: SIGNIFICANT CHANGE UP
NRBC # BLD: 0 /100 WBCS — SIGNIFICANT CHANGE UP (ref 0–0)
OLIGOCLONAL BANDS CSF ELPH-IMP: SIGNIFICANT CHANGE UP
PCO2 BLDA: 54 MMHG — HIGH (ref 32–45)
PCO2 BLDA: 55 MMHG — HIGH (ref 32–45)
PH BLDA: 7.31 — LOW (ref 7.35–7.45)
PH BLDA: 7.31 — LOW (ref 7.35–7.45)
PLATELET # BLD AUTO: 207 K/UL — SIGNIFICANT CHANGE UP (ref 150–400)
PO2 BLDA: 145 MMHG — HIGH (ref 83–108)
PO2 BLDA: 184 MMHG — HIGH (ref 83–108)
POTASSIUM SERPL-MCNC: 3.7 MMOL/L — SIGNIFICANT CHANGE UP (ref 3.5–5.3)
POTASSIUM SERPL-SCNC: 3.7 MMOL/L — SIGNIFICANT CHANGE UP (ref 3.5–5.3)
RBC # BLD: 2.37 M/UL — LOW (ref 3.8–5.2)
RBC # FLD: 14.9 % — HIGH (ref 10.3–14.5)
SAO2 % BLDA: 99 % — SIGNIFICANT CHANGE UP (ref 95–100)
SAO2 % BLDA: SIGNIFICANT CHANGE UP % (ref 95–100)
SODIUM SERPL-SCNC: 136 MMOL/L — SIGNIFICANT CHANGE UP (ref 135–145)
SPECIMEN SOURCE: SIGNIFICANT CHANGE UP
VDRL CSF-TITR: NEGATIVE — SIGNIFICANT CHANGE UP
WBC # BLD: 7.96 K/UL — SIGNIFICANT CHANGE UP (ref 3.8–10.5)
WBC # FLD AUTO: 7.96 K/UL — SIGNIFICANT CHANGE UP (ref 3.8–10.5)

## 2019-05-10 PROCEDURE — 99291 CRITICAL CARE FIRST HOUR: CPT

## 2019-05-10 PROCEDURE — 99233 SBSQ HOSP IP/OBS HIGH 50: CPT | Mod: GC

## 2019-05-10 PROCEDURE — 71045 X-RAY EXAM CHEST 1 VIEW: CPT | Mod: 26,77

## 2019-05-10 PROCEDURE — 71045 X-RAY EXAM CHEST 1 VIEW: CPT | Mod: 26

## 2019-05-10 PROCEDURE — 99231 SBSQ HOSP IP/OBS SF/LOW 25: CPT

## 2019-05-10 RX ORDER — SODIUM CHLORIDE 9 MG/ML
1000 INJECTION INTRAMUSCULAR; INTRAVENOUS; SUBCUTANEOUS
Refills: 0 | Status: DISCONTINUED | OUTPATIENT
Start: 2019-05-10 | End: 2019-05-11

## 2019-05-10 RX ORDER — PANTOPRAZOLE SODIUM 20 MG/1
40 TABLET, DELAYED RELEASE ORAL DAILY
Refills: 0 | Status: DISCONTINUED | OUTPATIENT
Start: 2019-05-10 | End: 2019-05-13

## 2019-05-10 RX ORDER — TAMOXIFEN CITRATE 20 MG/1
20 TABLET, FILM COATED ORAL
Refills: 0 | Status: DISCONTINUED | OUTPATIENT
Start: 2019-05-10 | End: 2019-05-13

## 2019-05-10 RX ADMIN — PANTOPRAZOLE SODIUM 40 MILLIGRAM(S): 20 TABLET, DELAYED RELEASE ORAL at 18:27

## 2019-05-10 RX ADMIN — Medication 2: at 06:12

## 2019-05-10 RX ADMIN — Medication 650 MILLIGRAM(S): at 18:27

## 2019-05-10 RX ADMIN — NYSTATIN CREAM 1 APPLICATION(S): 100000 CREAM TOPICAL at 23:10

## 2019-05-10 RX ADMIN — ZINC OXIDE 1 APPLICATION(S): 200 OINTMENT TOPICAL at 14:28

## 2019-05-10 RX ADMIN — Medication 1 APPLICATION(S): at 23:11

## 2019-05-10 RX ADMIN — Medication 50 GRAM(S): at 18:31

## 2019-05-10 RX ADMIN — ERTAPENEM SODIUM 120 MILLIGRAM(S): 1 INJECTION, POWDER, LYOPHILIZED, FOR SOLUTION INTRAMUSCULAR; INTRAVENOUS at 21:04

## 2019-05-10 RX ADMIN — NYSTATIN CREAM 1 APPLICATION(S): 100000 CREAM TOPICAL at 06:23

## 2019-05-10 RX ADMIN — ENOXAPARIN SODIUM 40 MILLIGRAM(S): 100 INJECTION SUBCUTANEOUS at 01:28

## 2019-05-10 RX ADMIN — CHLORHEXIDINE GLUCONATE 1 APPLICATION(S): 213 SOLUTION TOPICAL at 06:25

## 2019-05-10 RX ADMIN — ENOXAPARIN SODIUM 40 MILLIGRAM(S): 100 INJECTION SUBCUTANEOUS at 23:09

## 2019-05-10 RX ADMIN — Medication 650 MILLIGRAM(S): at 19:37

## 2019-05-10 RX ADMIN — Medication 1 APPLICATION(S): at 14:27

## 2019-05-10 RX ADMIN — TAMOXIFEN CITRATE 20 MILLIGRAM(S): 20 TABLET, FILM COATED ORAL at 08:03

## 2019-05-10 RX ADMIN — SODIUM CHLORIDE 90 MILLILITER(S): 9 INJECTION INTRAMUSCULAR; INTRAVENOUS; SUBCUTANEOUS at 19:41

## 2019-05-10 RX ADMIN — ZINC OXIDE 1 APPLICATION(S): 200 OINTMENT TOPICAL at 06:23

## 2019-05-10 RX ADMIN — TAMOXIFEN CITRATE 20 MILLIGRAM(S): 20 TABLET, FILM COATED ORAL at 18:26

## 2019-05-10 RX ADMIN — Medication 1 APPLICATION(S): at 06:23

## 2019-05-10 RX ADMIN — ZINC OXIDE 1 APPLICATION(S): 200 OINTMENT TOPICAL at 23:10

## 2019-05-10 RX ADMIN — CHLORHEXIDINE GLUCONATE 15 MILLILITER(S): 213 SOLUTION TOPICAL at 06:22

## 2019-05-10 RX ADMIN — NYSTATIN CREAM 1 APPLICATION(S): 100000 CREAM TOPICAL at 14:27

## 2019-05-10 RX ADMIN — Medication 25 MILLIGRAM(S): at 18:27

## 2019-05-10 NOTE — PROGRESS NOTE ADULT - ASSESSMENT
58yo F PMHx polio, breast cancer, DM, Endometrial Cancer s/p exploratory laparotomy, enterolysis, SHAMIR, BSO, pelvic lymphadenectomy, low anterior resection mobilization of splenic flexure, end colostomy on 7/30/18, rectovaginal fistula, numerous admissions for urinary tract infection presented to [facility] in the setting of urosepsis. Hospital course complicated by blood stream infection (currently on ertapenem) and respiratory failure requiring intubation s/p extubation on 5/2. Now with worsening respiratory status due to likely neurological process (CIPD vs   vs paraneoplastic vs AIDP?) intubated for CO2 narcosis and stepped up to MICU, now improving s/p IVIG.    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)  - ABG w CO2 102 on am of 5/8, and patient lethargic  - continue w intubation and ventilation as patient w respiratory compromise due to peripheral neurologic process, however patient improving and able to take spontaneous breaths  - tolerating CPAP well w good inspiratory effort, tidal volumes, and respiratory rate  - Continue treatment w IVIG per neurology for two more doses  - monitor ABGs - hypercapnea improved      #hx of urinary retention  - retaining on 5/8, so placed joseph  - might need SP catheter  - appreciate urology recs    Neurology  #Acute polyneuropathy  - improving  - patient w likely AIDP leading to respiratory compromise  - previous EMG w demyelinating polyneuropathy  - recs per neurology  - trial of IVIG on 5/7 and 5/8 to determine further treatment - good response so will give two more doses  - f/u results of LP studies      INFECTIOUS DISEASE  #hx of recurrent ESBL ecoli UTI and bacteremia  - previously on admission patient admitted to MICU in septic shock 2/2 to ESBL ecoli bactermia and UTI  - continue ertepenem daily per ID recs    HEME/ONC  #Stage IV endometrial adenocarcinoma  per GYN-ONC w/ interval increase in tumor burden. Pt was treated with Anastrazole and initiated 3 weeks of megace 80mg BID followed by 3 weeks of tamoxifen. started megace and metformin 4/10, was on Tamoxifen for 21 days (5/1/19-5/8/19), however now w NG tube so cannot take bc cannot be crushed    FLUIDS/ELECTROLYTES/NUTRITION  -IVF: NS at 90cc/hr  -Monitor, Replete to K>4 and Mg>2  -Diet: nutrition consult for tube feeds    PROPHYLAXIS  -DVT: lovenox and SCDs  -GI: protonix    DISPO: MICU  CODE STATUS: full code; palliative following regarding GOC 60yo F PMHx polio, breast cancer, DM, Endometrial Cancer s/p exploratory laparotomy, enterolysis, SHAMIR, BSO, pelvic lymphadenectomy, low anterior resection mobilization of splenic flexure, end colostomy on 7/30/18, rectovaginal fistula, numerous admissions for urinary tract infection presented to [facility] in the setting of urosepsis. Hospital course complicated by blood stream infection (currently on ertapenem) and respiratory failure requiring intubation s/p extubation on 5/2. Intubated urgently on 7 lachman for CO2 narcosis in setting of AIDP and stepped up to MICU, now improving s/p IVIG and extubated to bipap on 5/10.    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)  resolved, extubated to bipap on 5/10  - Continue treatment w IVIG per neurology for one more dose, then will need another round of 4 doses in one month      #hx of urinary retention  - retaining on 5/8, so placed joseph  - might need SP catheter  - appreciate urology recs    Neurology  #Acute polyneuropathy  - improving  - patient w likely AIDP leading to respiratory compromise  - previous EMG w demyelinating polyneuropathy  - recs per neurology  - s/p 3 rounds of IVIG, will give one more today  - ordered mr brain w/wo contrast and MR cervical spine w/wo contrast      INFECTIOUS DISEASE  #hx of recurrent ESBL ecoli UTI and bacteremia  - previously on admission patient admitted to MICU in septic shock 2/2 to ESBL ecoli bactermia and UTI  - continue ertepenem daily per ID recs    HEME/ONC  #Stage IV endometrial adenocarcinoma  per GYN-ONC w/ interval increase in tumor burden. Pt was treated with Anastrazole and initiated 3 weeks of megace 80mg BID followed by 3 weeks of tamoxifen. started megace and metformin 4/10, on Tamoxifen for 21 days (5/1/19-), now w crushed version    FLUIDS/ELECTROLYTES/NUTRITION  -IVF: none  -Monitor, Replete to K>4 and Mg>2  -Diet: tube feeds    PROPHYLAXIS  -DVT: lovenox and SCDs  -GI: protonix    DISPO: MICU  CODE STATUS: full code; palliative following regarding GOC

## 2019-05-10 NOTE — PROGRESS NOTE ADULT - SUBJECTIVE AND OBJECTIVE BOX
Pt seen and examined at bedside. She is responsive and mouthing words. Pt denies any pain.     T(F): 99.5 (05-10-19 @ 14:29), Max: 100.1 (05-10-19 @ 09:58)  HR: 88 (05-10-19 @ 14:00) (68 - 98)  BP: 114/65 (05-10-19 @ 14:00) (80/49 - 142/73)  RR: 23 (05-10-19 @ 14:00) (11 - 32)  SpO2: 100% (05-10-19 @ 14:00) (99% - 100%)  Wt(kg): --  I&O's Summary    09 May 2019 07:  -  10 May 2019 07:00  --------------------------------------------------------  IN: 1810 mL / OUT: 1515 mL / NET: 295 mL    10 May 2019 07:01  -  10 May 2019 15:08  --------------------------------------------------------  IN: 252 mL / OUT: 205 mL / NET: 47 mL    MEDICATIONS  (STANDING):  acetaminophen    Suspension .. 650 milliGRAM(s) Oral once  chlorhexidine 0.12% Liquid 15 milliLiter(s) Oral Mucosa every 12 hours  chlorhexidine 2% Cloths 1 Application(s) Topical <User Schedule>  dextrose 5%. 1000 milliLiter(s) (50 mL/Hr) IV Continuous <Continuous>  dextrose 50% Injectable 25 Gram(s) IV Push once  diphenhydrAMINE   Injectable 25 milliGRAM(s) IV Push once  enoxaparin Injectable 40 milliGRAM(s) SubCutaneous every 24 hours  ertapenem  IVPB 1000 milliGRAM(s) IV Intermittent every 24 hours  immune   globulin gamma IVPB 30 Gram(s) IV Intermittent every 24 hours  insulin lispro (HumaLOG) corrective regimen sliding scale   SubCutaneous every 6 hours  nystatin Powder 1 Application(s) Topical three times a day  pantoprazole   Suspension 40 milliGRAM(s) Oral daily  petrolatum Ophthalmic Ointment 1 Application(s) Both EYES three times a day  tamoxifen 20 milliGRAM(s) Oral <User Schedule>  zinc oxide 20% Ointment 1 Application(s) Topical three times a day    MEDICATIONS  (PRN):  acetaminophen   Tablet .. 650 milliGRAM(s) Oral every 6 hours PRN Temp greater or equal to 38C (100.4F), Mild Pain (1 - 3)  glucagon  Injectable 1 milliGRAM(s) IntraMuscular once PRN Glucose LESS THAN 70 milligrams/deciliter    Physical Exam:  Gen: No Acute Distress  Pulm: Clear to auscultation bilaterally, On high flow nasal cannula satting at 100%   GI: soft, nontender, nondistended, ostomy with stool and gas  Ext: SCDs in place, wnl    LABS:                        7.2    7.96  )-----------( 207      ( 10 May 2019 06:16 )             23.0     05-10    136  |  101  |  22  ----------------------------<  186<H>  3.7   |  25  |  0.48<L>    Ca    8.8      10 May 2019 06:16  Phos  2.3     05-09  Mg     2.1     -10    TPro  7.8  /  Alb  2.6<L>  /  TBili  <0.2  /  DBili  x   /  AST  13  /  ALT  11  /  AlkPhos  74  05-09      Urinalysis Basic - ( 09 May 2019 02:06 )    Color: Yellow / Appearance: Hazy / S.010 / pH: x  Gluc: x / Ketone: 15 mg/dL  / Bili: Small / Urobili: 0.2 E.U./dL   Blood: x / Protein: 30 mg/dL / Nitrite: NEGATIVE   Leuk Esterase: NEGATIVE / RBC: 5-10 /HPF / WBC > 10 /HPF   Sq Epi: x / Non Sq Epi: 5-10 /HPF / Bacteria: Present /HPF

## 2019-05-10 NOTE — PROGRESS NOTE ADULT - ASSESSMENT
58yo F PMHx polio, breast cancer, DM, Endometrial Cancer s/p exploratory laparotomy, enterolysis, SHAMIR, BSO, pelvic lymphadenectomy, low anterior resection mobilization of splenic flexure, end colostomy on 7/30/18, rectovaginal fistula, numerous admissions for urinary tract infection presented to [facility] in the setting of urosepsis. Hospital course complicated by blood stream infection (currently on ertapenem) and respiratory failure requiring intubation s/p extubation on 5/2. Neurology consulted for worsening lower extremity weakness. On prior admission EMG raises suspicion for CIDP-spectrum disorder. Per collateral information patient became noticeably weak mala in her hands for approx 1 week prior to being diagnosed with IDP in April, suggesting a more acute process. 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     - s/p three days of IVIG 500mg/kg on 5/7/2019, will need a total for 4 days (received 2 dose from [facility]) - last dose today   - Given A blood type, will need anti A antibody depleted formulation - please order Privigen IVIG formulation (not Gammagard) to avoid possible effect of hemolytic anemia  - f/u on MG antibodies: Acetylcholine Blocking AB, Acetylcholine Modulating AB, Acetylcholine Receptor Binding Antibody, MuSK Antibody Test  - MR brain w and wo con & MR cervical spine w and wo con  - Patient will need IVIG treatment next in 2 weeks on 5/24    Neurology will follow 58yo F PMHx polio, breast cancer, DM, Endometrial Cancer s/p exploratory laparotomy, enterolysis, SHAMIR, BSO, pelvic lymphadenectomy, low anterior resection mobilization of splenic flexure, end colostomy on 7/30/18, rectovaginal fistula, numerous admissions for urinary tract infection presented to [facility] in the setting of urosepsis. Hospital course complicated by blood stream infection (currently on ertapenem) and respiratory failure requiring intubation s/p extubation on 5/2. Neurology consulted for worsening lower extremity weakness. On prior admission EMG raises suspicion for CIDP-spectrum disorder. Per collateral information patient became noticeably weak mala in her hands for approx 1 week prior to being diagnosed with IDP in April, suggesting a more acute process. 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     - s/p three days of IVIG 500mg/kg on 5/7/2019, will need a total for 4 days (received 2 dose from [facility]) - last dose today   - Given A blood type, will need anti A antibody depleted formulation - inc Privigen or Gammunex formulation (not Gammagard) to avoid possible effect of hemolytic anemia  - f/u on MG antibodies: Acetylcholine Blocking AB, Acetylcholine Modulating AB, Acetylcholine Receptor Binding Antibody, MuSK Antibody Test  - MR brain w and wo con & MR cervical spine w and wo con  - Patient will need IVIG treatment next in 2 weeks on 5/24- will give 0.5g/kg daily for 2 days every 2 weeks for at least 2 months     Neurology will follow

## 2019-05-10 NOTE — PROGRESS NOTE ADULT - ASSESSMENT
58yo F HD12 with stage IV endometrial adenocarcinoma s/p staging surgery '18 and 1 round of chemotherapy 2/19 readmitted from [facility] with fever, previously admitted for management of symptomatic rectovaginal and enterovaginal fistulas. Presents on referral from [facility] with urosepsis.  Rapid response called due to worsening respiratory status s/p intubation and MICU admission. She then was on regional GYN service however had respiratory weakness and was transferred to Medicine/tele HD9 until requiring intubation again HD10 and is now in the ICU. 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     1. Neuro: re-intuabated. guillian barre .IVIG 3 days (5/8). Follow up neuro recs. f/u labs. LP 5/8.   2. Pulm: intubated, tolerating cpap  3. Cardio: sedated, off pressor support  4. FEN/GI: dobhoff, on tube feeds  5. : joseph replaced 5/8 after found to have overflow incontinence  6. ID: IV ertapenem until 5/14, s/p meropenem, s/p vanc  7. Endocrine: FS, ISS, Metformin 1000mg BID  8. VTE prophylaxis - SCDs, Lovenox 40mg daily   9. GYN malignancy  -stopped anastrazole and initiated 3 weeks of megace 80mg BID followed by 3 weeks of tamoxifen. started megace and metformin 4/10, now on Tamoxifen for 21 days (5/1/19-  not taking pills at this time but on crushed version of tamoxifen  10. Derm: monitor sacrum for s/s of pressure ulcer. now stage 2.  11. Social work/palliative: palliative care following. dispo plan to acute rehab when stable.

## 2019-05-10 NOTE — PROGRESS NOTE ADULT - PROBLEM SELECTOR PLAN 1
IMPROVING - suspected 2/2 problem #2 below; CCM team now going to extubate patient (done as of time of note writing)  - continue CCM management for respiratory failure  - encouragement and support given to patient, will try to return to talk to her later today when off ventilatory support

## 2019-05-10 NOTE — PROGRESS NOTE ADULT - PROBLEM SELECTOR PLAN 8
Patient will require a multidisciplinary meeting in the coming days between GYN-ONC, primary team, neurology, our team, and patient's family to help further discuss near to long term goals of care. Particularly with respect to resumption of chemotherapy, and even whether not Ms. Baker would want to be re-intubated should her respiratory status become compromised again.   - will f/u with above teams when appropriate time to discuss ongoing goals of care for her debility, and malignancy; otherwise continue current acute management for reversible causes i.e. w/ problem #2 above

## 2019-05-10 NOTE — PROGRESS NOTE ADULT - SUBJECTIVE AND OBJECTIVE BOX
NEUROLOGY CONSULT PROGRESS NOTE    INTERVAL HISTORY: No acute events overnight.     REVIEW OF SYSTEMS:  Intubation, awake and responding to commands.     MEDICATIONS:  acetaminophen    Suspension .. 650 milliGRAM(s) Oral once  acetaminophen   Tablet .. 650 milliGRAM(s) Oral every 6 hours PRN  chlorhexidine 0.12% Liquid 15 milliLiter(s) Oral Mucosa every 12 hours  chlorhexidine 2% Cloths 1 Application(s) Topical <User Schedule>  dextrose 5%. 1000 milliLiter(s) IV Continuous <Continuous>  dextrose 50% Injectable 25 Gram(s) IV Push once  diphenhydrAMINE   Injectable 25 milliGRAM(s) IV Push once  enoxaparin Injectable 40 milliGRAM(s) SubCutaneous every 24 hours  ertapenem  IVPB 1000 milliGRAM(s) IV Intermittent every 24 hours  glucagon  Injectable 1 milliGRAM(s) IntraMuscular once PRN  immune   globulin gamma IVPB 30 Gram(s) IV Intermittent every 24 hours  insulin lispro (HumaLOG) corrective regimen sliding scale   SubCutaneous every 6 hours  nystatin Powder 1 Application(s) Topical three times a day  pantoprazole   Suspension 40 milliGRAM(s) Oral daily  petrolatum Ophthalmic Ointment 1 Application(s) Both EYES three times a day  tamoxifen 20 milliGRAM(s) Oral <User Schedule>  zinc oxide 20% Ointment 1 Application(s) Topical three times a day    VITAL SIGNS:  Vital Signs Last 24 Hrs  T(C): 37.8 (10 May 2019 09:58), Max: 37.8 (10 May 2019 09:58)  T(F): 100.1 (10 May 2019 09:58), Max: 100.1 (10 May 2019 09:58)  HR: 80 (10 May 2019 12:00) (68 - 80)  BP: 142/73 (10 May 2019 12:00) (80/49 - 142/73)  BP(mean): 101 (10 May 2019 12:00) (58 - 101)  RR: 28 (10 May 2019 12:00) (11 - 32)  SpO2: 100% (10 May 2019 12:00) (93% - 100%)    PHYSICAL EXAMINATION:  Constitutional: Frail middle age female, intubated  Eyes: Conjunctiva and sclera clear  Neurologic:  - Mental Status:  Awake and able to follow commands as directed  - Cranial Nerves II-XII:    II: Pupils are equal, round, and reactive to light.  III, IV, VI:  EOMI  V:  Facial sensation is intact  VII:  Weak facial muscles but with normal eye closure   VIII:  Hearing is intact to finger rub.  XI:  Weak shoulder shrug, now able to turn head w/o assistance   - Motor: Hypotrophic muscles. Upper extremities R arm 4-/5, R  4/5, L arm 4+/5, L  4+/5 Lower extremities Leg 2-/5 bilaterally, Ankle 0/5  - Reflexes:  0/5 reflexes at biceps, triceps, brachioradialis, knees, and ankles. No babinski response.   - Sensory:  Intact to light touch  - Gait: Deferred     LABS:                          7.2    7.96  )-----------( 207      ( 10 May 2019 06:16 )             23.0     05-10    136  |  101  |  22  ----------------------------<  186<H>  3.7   |  25  |  0.48<L>    Ca    8.8      10 May 2019 06:16  Phos  2.3     -  Mg     2.1     05-10    TPro  7.8  /  Alb  2.6<L>  /  TBili  <0.2  /  DBili  x   /  AST  13  /  ALT  11  /  AlkPhos  74  05-09    PT/INR - ( 08 May 2019 13:57 )   PT: 12.7 sec;   INR: 1.12          PTT - ( 08 May 2019 13:57 )  PTT:30.5 sec  Urinalysis Basic - ( 09 May 2019 02:06 )    Color: Yellow / Appearance: Hazy / S.010 / pH: x  Gluc: x / Ketone: 15 mg/dL  / Bili: Small / Urobili: 0.2 E.U./dL   Blood: x / Protein: 30 mg/dL / Nitrite: NEGATIVE   Leuk Esterase: NEGATIVE / RBC: 5-10 /HPF / WBC > 10 /HPF   Sq Epi: x / Non Sq Epi: 5-10 /HPF / Bacteria: Present /HPF    RADIOLOGY & ADDITIONAL STUDIES:    - Pending MR Head and MR Cervical w/ and w/o contrast NEUROLOGY CONSULT PROGRESS NOTE    INTERVAL HISTORY: No acute events overnight.     REVIEW OF SYSTEMS:  Intubation, awake and responding to commands.     MEDICATIONS:  acetaminophen    Suspension .. 650 milliGRAM(s) Oral once  acetaminophen   Tablet .. 650 milliGRAM(s) Oral every 6 hours PRN  chlorhexidine 0.12% Liquid 15 milliLiter(s) Oral Mucosa every 12 hours  chlorhexidine 2% Cloths 1 Application(s) Topical <User Schedule>  dextrose 5%. 1000 milliLiter(s) IV Continuous <Continuous>  dextrose 50% Injectable 25 Gram(s) IV Push once  diphenhydrAMINE   Injectable 25 milliGRAM(s) IV Push once  enoxaparin Injectable 40 milliGRAM(s) SubCutaneous every 24 hours  ertapenem  IVPB 1000 milliGRAM(s) IV Intermittent every 24 hours  glucagon  Injectable 1 milliGRAM(s) IntraMuscular once PRN  immune   globulin gamma IVPB 30 Gram(s) IV Intermittent every 24 hours  insulin lispro (HumaLOG) corrective regimen sliding scale   SubCutaneous every 6 hours  nystatin Powder 1 Application(s) Topical three times a day  pantoprazole   Suspension 40 milliGRAM(s) Oral daily  petrolatum Ophthalmic Ointment 1 Application(s) Both EYES three times a day  tamoxifen 20 milliGRAM(s) Oral <User Schedule>  zinc oxide 20% Ointment 1 Application(s) Topical three times a day    VITAL SIGNS:  Vital Signs Last 24 Hrs  T(C): 37.8 (10 May 2019 09:58), Max: 37.8 (10 May 2019 09:58)  T(F): 100.1 (10 May 2019 09:58), Max: 100.1 (10 May 2019 09:58)  HR: 80 (10 May 2019 12:00) (68 - 80)  BP: 142/73 (10 May 2019 12:00) (80/49 - 142/73)  BP(mean): 101 (10 May 2019 12:00) (58 - 101)  RR: 28 (10 May 2019 12:00) (11 - 32)  SpO2: 100% (10 May 2019 12:00) (93% - 100%)    PHYSICAL EXAMINATION:  Constitutional: Frail middle age female, intubated  Eyes: Conjunctiva and sclera clear  Neurologic:  - Mental Status:  Awake and able to follow commands as directed  - Cranial Nerves II-XII:    II: Pupils are equal, round, and reactive to light.  III, IV, VI:  EOMI  V:  Facial sensation is intact  VII:  Weak facial muscles but with stronger eye closure   VIII:  Hearing is intact to finger rub.  XI:  Weak shoulder shrug, now able to turn head w/o assistance   - Motor: Hypotrophic muscles. Upper extremities R arm 4-/5, R  4/5, L arm 4+/5, L  4+/5 Lower extremities Leg 2-/5 bilaterally, Ankle 0/5  - Reflexes:  0/5 reflexes at biceps, triceps, brachioradialis, knees, and ankles. No babinski response.   - Sensory:  Intact to light touch  - Gait: Deferred     LABS:                          7.2    7.96  )-----------( 207      ( 10 May 2019 06:16 )             23.0     05-10    136  |  101  |  22  ----------------------------<  186<H>  3.7   |  25  |  0.48<L>    Ca    8.8      10 May 2019 06:16  Phos  2.3     -  Mg     2.1     05-10    TPro  7.8  /  Alb  2.6<L>  /  TBili  <0.2  /  DBili  x   /  AST  13  /  ALT  11  /  AlkPhos  74  05-09    PT/INR - ( 08 May 2019 13:57 )   PT: 12.7 sec;   INR: 1.12          PTT - ( 08 May 2019 13:57 )  PTT:30.5 sec  Urinalysis Basic - ( 09 May 2019 02:06 )    Color: Yellow / Appearance: Hazy / S.010 / pH: x  Gluc: x / Ketone: 15 mg/dL  / Bili: Small / Urobili: 0.2 E.U./dL   Blood: x / Protein: 30 mg/dL / Nitrite: NEGATIVE   Leuk Esterase: NEGATIVE / RBC: 5-10 /HPF / WBC > 10 /HPF   Sq Epi: x / Non Sq Epi: 5-10 /HPF / Bacteria: Present /HPF    RADIOLOGY & ADDITIONAL STUDIES:    - Pending MR Head and MR Cervical w/ and w/o contrast

## 2019-05-10 NOTE — PROGRESS NOTE ADULT - SUBJECTIVE AND OBJECTIVE BOX
Incomplete note    INTERVAL HPI/OVERNIGHT EVENTS:    OVERNIGHT: No overnight events.  SUBJECTIVE: Patient seen and examined at bedside.     ROS:  CV: Denies chest pain  Resp: Denies SOB  GI: Denies abdominal pain, constipation, diarrhea, nausea, vomiting  : Denies dysuria  ID: Denies fevers, chills  MSK: Denies joint pain     OBJECTIVE:    VITAL SIGNS:  ICU Vital Signs Last 24 Hrs  T(C): 37.4 (10 May 2019 02:03), Max: 37.6 (09 May 2019 09:00)  T(F): 99.3 (10 May 2019 02:03), Max: 99.7 (09 May 2019 09:00)  HR: 70 (10 May 2019 05:00) (66 - 88)  BP: 109/68 (10 May 2019 05:00) (80/49 - 118/60)  BP(mean): 80 (10 May 2019 05:00) (58 - 86)  ABP: --  ABP(mean): --  RR: 16 (10 May 2019 05:00) (11 - 35)  SpO2: 100% (10 May 2019 05:00) (93% - 100%)    Mode: AC/ CMV (Assist Control/ Continuous Mandatory Ventilation), RR (machine): 12, TV (machine): 320, FiO2: 35, PEEP: 5, ITime: 1, MAP: 8, PIP: 17     @ 07:  -  09 @ 07:00  --------------------------------------------------------  IN: 1865.4 mL / OUT: 1421 mL / NET: 444.4 mL     @ 07:01  -  -10 @ 06:02  --------------------------------------------------------  IN: 1730 mL / OUT: 1315 mL / NET: 415 mL      CAPILLARY BLOOD GLUCOSE      POCT Blood Glucose.: 133 mg/dL (10 May 2019 00:40)      PHYSICAL EXAM:  General: NAD, comfortable  HEENT: NCAT, PERRL, clear conjunctiva, no scleral icterus  Neck: supple, no JVD  Respiratory: CTA b/l, no wheezing, rhonchi, rales  Cardiovascular: RRR, normal S1S2, no M/R/G  Vascular: 2+ radial and DP pulses  Abdomen: soft, NT/ND, bowel sounds in all four quadrants, no palpable masses  Extremities: WWP, no clubbing, cyanosis, or edema  Skin: No rashes present  Neuro:     MEDICATIONS:  MEDICATIONS  (STANDING):  acetaminophen    Suspension .. 650 milliGRAM(s) Oral once  chlorhexidine 0.12% Liquid 15 milliLiter(s) Oral Mucosa every 12 hours  chlorhexidine 2% Cloths 1 Application(s) Topical <User Schedule>  dextrose 5%. 1000 milliLiter(s) (50 mL/Hr) IV Continuous <Continuous>  dextrose 50% Injectable 25 Gram(s) IV Push once  diphenhydrAMINE   Injectable 25 milliGRAM(s) IV Push once  enoxaparin Injectable 40 milliGRAM(s) SubCutaneous every 24 hours  ertapenem  IVPB 1000 milliGRAM(s) IV Intermittent every 24 hours  immune   globulin gamma IVPB 30 Gram(s) IV Intermittent every 24 hours  insulin lispro (HumaLOG) corrective regimen sliding scale   SubCutaneous every 6 hours  nystatin Powder 1 Application(s) Topical three times a day  pantoprazole   Suspension 40 milliGRAM(s) Oral daily  petrolatum Ophthalmic Ointment 1 Application(s) Both EYES three times a day  sodium chloride 0.9%. 1000 milliLiter(s) (100 mL/Hr) IV Continuous <Continuous>  tamoxifen 20 milliGRAM(s) Oral <User Schedule>  zinc oxide 20% Ointment 1 Application(s) Topical three times a day    MEDICATIONS  (PRN):  acetaminophen   Tablet .. 650 milliGRAM(s) Oral every 6 hours PRN Temp greater or equal to 38C (100.4F), Mild Pain (1 - 3)  glucagon  Injectable 1 milliGRAM(s) IntraMuscular once PRN Glucose LESS THAN 70 milligrams/deciliter      ALLERGIES:  Allergies    No Known Allergies    Intolerances    IVIG PRODUCT IS PRIGIVEN OR GAMMUNEX (Unknown)      LABS:                        7.5    7.27  )-----------( 243      ( 09 May 2019 05:19 )             26.1     05-09    138  |  96  |  24<H>  ----------------------------<  103<H>  4.4   |  31  |  0.54    Ca    9.1      09 May 2019 05:19  Phos  2.3       Mg     1.2         TPro  7.8  /  Alb  2.6<L>  /  TBili  <0.2  /  DBili  x   /  AST  13  /  ALT  11  /  AlkPhos  74      PT/INR - ( 08 May 2019 13:57 )   PT: 12.7 sec;   INR: 1.12          PTT - ( 08 May 2019 13:57 )  PTT:30.5 sec  Urinalysis Basic - ( 09 May 2019 02:06 )    Color: Yellow / Appearance: Hazy / S.010 / pH: x  Gluc: x / Ketone: 15 mg/dL  / Bili: Small / Urobili: 0.2 E.U./dL   Blood: x / Protein: 30 mg/dL / Nitrite: NEGATIVE   Leuk Esterase: NEGATIVE / RBC: 5-10 /HPF / WBC > 10 /HPF   Sq Epi: x / Non Sq Epi: 5-10 /HPF / Bacteria: Present /HPF        RADIOLOGY & ADDITIONAL TESTS: Reviewed. Incomplete note    INTERVAL HPI/OVERNIGHT EVENTS: Patient successfully extubated to bipap this morning after rounds.    SUBJECTIVE: Patient seen and examined at bedside. Patient intubated upon examination this morning. She feels much stronger this morning and has no acute complaints, besides wanting the tube out.      OBJECTIVE:    VITAL SIGNS:  ICU Vital Signs Last 24 Hrs  T(C): 37.4 (10 May 2019 02:03), Max: 37.6 (09 May 2019 09:00)  T(F): 99.3 (10 May 2019 02:03), Max: 99.7 (09 May 2019 09:00)  HR: 70 (10 May 2019 05:00) (66 - 88)  BP: 109/68 (10 May 2019 05:00) (80/49 - 118/60)  BP(mean): 80 (10 May 2019 05:00) (58 - 86)  ABP: --  ABP(mean): --  RR: 16 (10 May 2019 05:00) (11 - 35)  SpO2: 100% (10 May 2019 05:00) (93% - 100%)    Mode: AC/ CMV (Assist Control/ Continuous Mandatory Ventilation), RR (machine): 12, TV (machine): 320, FiO2: 35, PEEP: 5, ITime: 1, MAP: 8, PIP: 17     @ 07:  -   @ 07:00  --------------------------------------------------------  IN: 1865.4 mL / OUT: 1421 mL / NET: 444.4 mL     @ 07:  -  10 @ 06:02  --------------------------------------------------------  IN: 1730 mL / OUT: 1315 mL / NET: 415 mL      CAPILLARY BLOOD GLUCOSE      POCT Blood Glucose.: 133 mg/dL (10 May 2019 00:40)      PHYSICAL EXAM:  General: NAD, comfortable  HEENT: NCAT, PERRL, clear conjunctiva, no scleral icterus, ET tube and NG tube in place  Neck: supple, no JVD  Respiratory: CTA b/l, no wheezing, rhonchi, rales  Cardiovascular: RRR, normal S1S2, no M/R/G  Vascular: 2+ radial and DP pulses  Abdomen: soft, NT/ND, bowel sounds present, colostomy bag w hard stool and pink stoma, large central scar  : joseph in place w yellow urine  Extremities: WWP, no clubbing, cyanosis, or edema  Skin: No rashes present  Neuro:  strength 4/5, improved strength throughout. CN 2-12 intact.      MEDICATIONS:  MEDICATIONS  (STANDING):  acetaminophen    Suspension .. 650 milliGRAM(s) Oral once  chlorhexidine 0.12% Liquid 15 milliLiter(s) Oral Mucosa every 12 hours  chlorhexidine 2% Cloths 1 Application(s) Topical <User Schedule>  dextrose 5%. 1000 milliLiter(s) (50 mL/Hr) IV Continuous <Continuous>  dextrose 50% Injectable 25 Gram(s) IV Push once  diphenhydrAMINE   Injectable 25 milliGRAM(s) IV Push once  enoxaparin Injectable 40 milliGRAM(s) SubCutaneous every 24 hours  ertapenem  IVPB 1000 milliGRAM(s) IV Intermittent every 24 hours  immune   globulin gamma IVPB 30 Gram(s) IV Intermittent every 24 hours  insulin lispro (HumaLOG) corrective regimen sliding scale   SubCutaneous every 6 hours  nystatin Powder 1 Application(s) Topical three times a day  pantoprazole   Suspension 40 milliGRAM(s) Oral daily  petrolatum Ophthalmic Ointment 1 Application(s) Both EYES three times a day  sodium chloride 0.9%. 1000 milliLiter(s) (100 mL/Hr) IV Continuous <Continuous>  tamoxifen 20 milliGRAM(s) Oral <User Schedule>  zinc oxide 20% Ointment 1 Application(s) Topical three times a day    MEDICATIONS  (PRN):  acetaminophen   Tablet .. 650 milliGRAM(s) Oral every 6 hours PRN Temp greater or equal to 38C (100.4F), Mild Pain (1 - 3)  glucagon  Injectable 1 milliGRAM(s) IntraMuscular once PRN Glucose LESS THAN 70 milligrams/deciliter      ALLERGIES:  Allergies    No Known Allergies    Intolerances    IVIG PRODUCT IS PRIGIVEN OR GAMMUNEX (Unknown)      LABS:                        7.5    7.27  )-----------( 243      ( 09 May 2019 05:19 )             26.1         138  |  96  |  24<H>  ----------------------------<  103<H>  4.4   |  31  |  0.54    Ca    9.1      09 May 2019 05:19  Phos  2.3       Mg     1.2         TPro  7.8  /  Alb  2.6<L>  /  TBili  <0.2  /  DBili  x   /  AST  13  /  ALT  11  /  AlkPhos  74      PT/INR - ( 08 May 2019 13:57 )   PT: 12.7 sec;   INR: 1.12          PTT - ( 08 May 2019 13:57 )  PTT:30.5 sec  Urinalysis Basic - ( 09 May 2019 02:06 )    Color: Yellow / Appearance: Hazy / S.010 / pH: x  Gluc: x / Ketone: 15 mg/dL  / Bili: Small / Urobili: 0.2 E.U./dL   Blood: x / Protein: 30 mg/dL / Nitrite: NEGATIVE   Leuk Esterase: NEGATIVE / RBC: 5-10 /HPF / WBC > 10 /HPF   Sq Epi: x / Non Sq Epi: 5-10 /HPF / Bacteria: Present /HPF        RADIOLOGY & ADDITIONAL TESTS: Reviewed. INTERVAL HPI/OVERNIGHT EVENTS: Patient successfully extubated to bipap this morning after rounds.    SUBJECTIVE: Patient seen and examined at bedside. Patient intubated upon examination this morning. She feels much stronger this morning and has no acute complaints, besides wanting the tube out.      OBJECTIVE:    VITAL SIGNS:  ICU Vital Signs Last 24 Hrs  T(C): 37.4 (10 May 2019 02:03), Max: 37.6 (09 May 2019 09:00)  T(F): 99.3 (10 May 2019 02:03), Max: 99.7 (09 May 2019 09:00)  HR: 70 (10 May 2019 05:00) (66 - 88)  BP: 109/68 (10 May 2019 05:00) (80/49 - 118/60)  BP(mean): 80 (10 May 2019 05:00) (58 - 86)  ABP: --  ABP(mean): --  RR: 16 (10 May 2019 05:00) (11 - 35)  SpO2: 100% (10 May 2019 05:00) (93% - 100%)    Mode: AC/ CMV (Assist Control/ Continuous Mandatory Ventilation), RR (machine): 12, TV (machine): 320, FiO2: 35, PEEP: 5, ITime: 1, MAP: 8, PIP: 17     @ 07:  -   @ 07:00  --------------------------------------------------------  IN: 1865.4 mL / OUT: 1421 mL / NET: 444.4 mL     @ 07:01  -  10 @ 06:02  --------------------------------------------------------  IN: 1730 mL / OUT: 1315 mL / NET: 415 mL      CAPILLARY BLOOD GLUCOSE      POCT Blood Glucose.: 133 mg/dL (10 May 2019 00:40)      PHYSICAL EXAM:  General: NAD, comfortable  HEENT: NCAT, PERRL, clear conjunctiva, no scleral icterus, ET tube and NG tube in place  Neck: supple, no JVD  Respiratory: CTA b/l, no wheezing, rhonchi, rales  Cardiovascular: RRR, normal S1S2, no M/R/G  Vascular: 2+ radial and DP pulses  Abdomen: soft, NT/ND, bowel sounds present, colostomy bag w hard stool and pink stoma, large central scar  : joseph in place w yellow urine  Extremities: WWP, no clubbing, cyanosis, or edema  Skin: No rashes present  Neuro:  strength 4/5, improved strength throughout. CN 2-12 intact.      MEDICATIONS:  MEDICATIONS  (STANDING):  acetaminophen    Suspension .. 650 milliGRAM(s) Oral once  chlorhexidine 0.12% Liquid 15 milliLiter(s) Oral Mucosa every 12 hours  chlorhexidine 2% Cloths 1 Application(s) Topical <User Schedule>  dextrose 5%. 1000 milliLiter(s) (50 mL/Hr) IV Continuous <Continuous>  dextrose 50% Injectable 25 Gram(s) IV Push once  diphenhydrAMINE   Injectable 25 milliGRAM(s) IV Push once  enoxaparin Injectable 40 milliGRAM(s) SubCutaneous every 24 hours  ertapenem  IVPB 1000 milliGRAM(s) IV Intermittent every 24 hours  immune   globulin gamma IVPB 30 Gram(s) IV Intermittent every 24 hours  insulin lispro (HumaLOG) corrective regimen sliding scale   SubCutaneous every 6 hours  nystatin Powder 1 Application(s) Topical three times a day  pantoprazole   Suspension 40 milliGRAM(s) Oral daily  petrolatum Ophthalmic Ointment 1 Application(s) Both EYES three times a day  sodium chloride 0.9%. 1000 milliLiter(s) (100 mL/Hr) IV Continuous <Continuous>  tamoxifen 20 milliGRAM(s) Oral <User Schedule>  zinc oxide 20% Ointment 1 Application(s) Topical three times a day    MEDICATIONS  (PRN):  acetaminophen   Tablet .. 650 milliGRAM(s) Oral every 6 hours PRN Temp greater or equal to 38C (100.4F), Mild Pain (1 - 3)  glucagon  Injectable 1 milliGRAM(s) IntraMuscular once PRN Glucose LESS THAN 70 milligrams/deciliter      ALLERGIES:  Allergies    No Known Allergies    Intolerances    IVIG PRODUCT IS PRIGIVEN OR GAMMUNEX (Unknown)      LABS:                        7.5    7.27  )-----------( 243      ( 09 May 2019 05:19 )             26.1         138  |  96  |  24<H>  ----------------------------<  103<H>  4.4   |  31  |  0.54    Ca    9.1      09 May 2019 05:19  Phos  2.3       Mg     1.2         TPro  7.8  /  Alb  2.6<L>  /  TBili  <0.2  /  DBili  x   /  AST  13  /  ALT  11  /  AlkPhos  74      PT/INR - ( 08 May 2019 13:57 )   PT: 12.7 sec;   INR: 1.12          PTT - ( 08 May 2019 13:57 )  PTT:30.5 sec  Urinalysis Basic - ( 09 May 2019 02:06 )    Color: Yellow / Appearance: Hazy / S.010 / pH: x  Gluc: x / Ketone: 15 mg/dL  / Bili: Small / Urobili: 0.2 E.U./dL   Blood: x / Protein: 30 mg/dL / Nitrite: NEGATIVE   Leuk Esterase: NEGATIVE / RBC: 5-10 /HPF / WBC > 10 /HPF   Sq Epi: x / Non Sq Epi: 5-10 /HPF / Bacteria: Present /HPF        RADIOLOGY & ADDITIONAL TESTS: Reviewed.

## 2019-05-10 NOTE — PROGRESS NOTE ADULT - ASSESSMENT
58yo F HD12 with stage IV endometrial adenocarcinoma s/p staging surgery '18 and 1 round of chemotherapy 2/19 readmitted from [facility] with fever, previously admitted for management of symptomatic rectovaginal and enterovaginal fistulas. Presents on referral from [facility] with urosepsis.  Rapid response called due to worsening respiratory status s/p intubation and MICU admission. She then was on regional GYN service however had respiratory weakness and was transferred to Medicine/tele HD9 until requiring intubation again HD10 and is now in the ICU. 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     1. Neuro: AIDP .IVIG 4 days (5/7-5/10). Follow up neuro recs. f/u labs. LP 5/8. Follow up LP labs   2. Pulm: Extubated today   3. Cardio: off pressors. vitals stable   4. FEN/GI: dobhoff, on tube feeds. Nutrition following   5. : joseph replaced 5/8 after found to have overflow incontinence  6. ID: IV ertapenem until 5/14, s/p meropenem, s/p vanc  7. Endocrine: FS, ISS, Metformin 1000mg BID  8. VTE prophylaxis - SCDs, Lovenox 40mg daily   9. GYN malignancy  -stopped anastrazole and initiated 3 weeks of megace 80mg BID followed by 3 weeks of tamoxifen. started megace and metformin 4/10, now on Tamoxifen for 21 days (5/3/19-)  not taking pills at this time but on crushed version of tamoxifen  10. Derm: monitor sacrum for s/s of pressure ulcer. now stage 2. Wound care following   11. Social work/palliative: palliative care following. dispo plan to acute rehab when stable  12. PT/OT consult

## 2019-05-10 NOTE — PROGRESS NOTE ADULT - SUBJECTIVE AND OBJECTIVE BOX
GYN Progress Note    Patient seen at bedside.  she did well overnight.  afebrile.   remains off all pressers and sedation.  tolerating cpap.  responsive and communicative this AM.    Vital Signs Last 24 Hrs  T(C): 37.2 (10 May 2019 06:02), Max: 37.4 (10 May 2019 02:03)  T(F): 98.9 (10 May 2019 06:02), Max: 99.3 (10 May 2019 02:03)  HR: 74 (10 May 2019 08:00) (68 - 80)  BP: 115/67 (10 May 2019 08:00) (80/49 - 121/64)  BP(mean): 84 (10 May 2019 08:00) (58 - 88)  RR: 28 (10 May 2019 08:00) (11 - 28)  SpO2: 100% (10 May 2019 08:00) (93% - 100%)    Physical Exam:  Gen: No Acute Distress  Pulm: Clear to auscultation bilaterally  GI: soft, appropriately nontender, nondistended, ostomy with stool and gas  Ext: SCDs in place, [facility]    I&O's Summary    09 May 2019 07:01  -  10 May 2019 07:00  --------------------------------------------------------  IN: 1810 mL / OUT: 1515 mL / NET: 295 mL    10 May 2019 07:01  -  10 May 2019 09:04  --------------------------------------------------------  IN: 164 mL / OUT: 0 mL / NET: 164 mL      MEDICATIONS  (STANDING):  acetaminophen    Suspension .. 650 milliGRAM(s) Oral once  chlorhexidine 0.12% Liquid 15 milliLiter(s) Oral Mucosa every 12 hours  chlorhexidine 2% Cloths 1 Application(s) Topical <User Schedule>  dextrose 5%. 1000 milliLiter(s) (50 mL/Hr) IV Continuous <Continuous>  dextrose 50% Injectable 25 Gram(s) IV Push once  diphenhydrAMINE   Injectable 25 milliGRAM(s) IV Push once  enoxaparin Injectable 40 milliGRAM(s) SubCutaneous every 24 hours  ertapenem  IVPB 1000 milliGRAM(s) IV Intermittent every 24 hours  immune   globulin gamma IVPB 30 Gram(s) IV Intermittent every 24 hours  insulin lispro (HumaLOG) corrective regimen sliding scale   SubCutaneous every 6 hours  nystatin Powder 1 Application(s) Topical three times a day  pantoprazole   Suspension 40 milliGRAM(s) Oral daily  petrolatum Ophthalmic Ointment 1 Application(s) Both EYES three times a day  tamoxifen 20 milliGRAM(s) Oral <User Schedule>  zinc oxide 20% Ointment 1 Application(s) Topical three times a day    MEDICATIONS  (PRN):  acetaminophen   Tablet .. 650 milliGRAM(s) Oral every 6 hours PRN Temp greater or equal to 38C (100.4F), Mild Pain (1 - 3)  glucagon  Injectable 1 milliGRAM(s) IntraMuscular once PRN Glucose LESS THAN 70 milligrams/deciliter      LABS:                        7.2    7.96  )-----------( 207      ( 10 May 2019 06:16 )             23.0     05-10    136  |  101  |  22  ----------------------------<  186<H>  3.7   |  25  |  0.48<L>    Ca    8.8      10 May 2019 06:16  Phos  2.3     05-09  Mg     2.1     05-10    TPro  7.8  /  Alb  2.6<L>  /  TBili  <0.2  /  DBili  x   /  AST  13  /  ALT  11  /  AlkPhos  74  05-09    PT/INR - ( 08 May 2019 13:57 )   PT: 12.7 sec;   INR: 1.12          PTT - ( 08 May 2019 13:57 )  PTT:30.5 sec  Urinalysis Basic - ( 09 May 2019 02:06 )    Color: Yellow / Appearance: Hazy / S.010 / pH: x  Gluc: x / Ketone: 15 mg/dL  / Bili: Small / Urobili: 0.2 E.U./dL   Blood: x / Protein: 30 mg/dL / Nitrite: NEGATIVE   Leuk Esterase: NEGATIVE / RBC: 5-10 /HPF / WBC > 10 /HPF   Sq Epi: x / Non Sq Epi: 5-10 /HPF / Bacteria: Present /HPF

## 2019-05-10 NOTE — CHART NOTE - NSCHARTNOTEFT_GEN_A_CORE
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 Provides: 1056mL TV, 1367kcal, 74g protein, 866mL free H2O, 106% RDI, 1.55g/kg IBW protein, 22.5 non pro kcal    PO Intake: Good (%) [   ]  Fair (50-75%) [   ] Poor (<25%) [   ]- N/A NPO w/EN    GI Issues: No N/V/C/D reported at this time. Colostomy w/soft stool    Pain: Pt denies pain at this time     Skin Integrity: Micky 13  Sacrum stage I pressure injury     Labs:   05-10    136  |  101  |  22  ----------------------------<  186<H>  3.7   |  25  |  0.48<L>    Ca    8.8      10 May 2019 06:16  Phos  2.3     05-09  Mg     2.1     05-10    TPro  7.8  /  Alb  2.6<L>  /  TBili  <0.2  /  DBili  x   /  AST  13  /  ALT  11  /  AlkPhos  74  05-09    CAPILLARY BLOOD GLUCOSE      POCT Blood Glucose.: 149 mg/dL (10 May 2019 11:44)  POCT Blood Glucose.: 186 mg/dL (10 May 2019 06:08)  POCT Blood Glucose.: 133 mg/dL (10 May 2019 00:40)  POCT Blood Glucose.: 103 mg/dL (09 May 2019 18:52)      Medications:  MEDICATIONS  (STANDING):  acetaminophen    Suspension .. 650 milliGRAM(s) Oral once  chlorhexidine 0.12% Liquid 15 milliLiter(s) Oral Mucosa every 12 hours  chlorhexidine 2% Cloths 1 Application(s) Topical <User Schedule>  dextrose 5%. 1000 milliLiter(s) (50 mL/Hr) IV Continuous <Continuous>  dextrose 50% Injectable 25 Gram(s) IV Push once  diphenhydrAMINE   Injectable 25 milliGRAM(s) IV Push once  enoxaparin Injectable 40 milliGRAM(s) SubCutaneous every 24 hours  ertapenem  IVPB 1000 milliGRAM(s) IV Intermittent every 24 hours  immune   globulin gamma IVPB 30 Gram(s) IV Intermittent every 24 hours  insulin lispro (HumaLOG) corrective regimen sliding scale   SubCutaneous every 6 hours  nystatin Powder 1 Application(s) Topical three times a day  pantoprazole   Suspension 40 milliGRAM(s) Oral daily  petrolatum Ophthalmic Ointment 1 Application(s) Both EYES three times a day  tamoxifen 20 milliGRAM(s) Oral <User Schedule>  zinc oxide 20% Ointment 1 Application(s) Topical three times a day    MEDICATIONS  (PRN):  acetaminophen   Tablet .. 650 milliGRAM(s) Oral every 6 hours PRN Temp greater or equal to 38C (100.4F), Mild Pain (1 - 3)  glucagon  Injectable 1 milliGRAM(s) IntraMuscular once PRN Glucose LESS THAN 70 milligrams/deciliter      Weight: 58.3kg (4/30)  56.2kg (5/9)    Weight Change:   4.66% unintentional wt loss x1 mo.   Likely d/t difficulty meeting increased needs at rehab  2kg weight loss since admit; needs being met inconsistently     Estimated energy needs:   Ideal body weight used for calculations as pt >120% of IBW.   Nutrient needs based on [facility] standards of care for maintenance in older adults.   needs adjusted for age, PU stage 2, catabolic illness, moderate wt loss over last month  1190-1428kcal/day (25-30kcal/kg)  67-76g pro/day (1.4-1.6g/kg)  1428-1666ml fluid/day (30-35ml/kg)    Subjective:   53yo F known to this RD from prior admission with known stage IV endometrial cancer, likely with progression of disease, complex fistulae, chronic indwelling [place] admitted with fever and urosepsis. Pt intubated 2/2 acute hypoxic respiratory failure, and successfully extubated on 5/2. Pt transferred to [facility], and started on PO diet. Required reintubation on 5/8 2/2 CO2 narcosis likely d/t respiratory muscle insufficiency/AIDP. S/p IVIG and s/p LP. Pt seen in room and discussed during MICU rounds. Pt intubated on CPAP trial, awake, alert. No sedation or pressors at this time. MAP 85. EN running at goal w/good tolerance per RN. Pt denies N/V or pain at this time. Colostomy w/good output. Pt successfully extubated to BiPAP after rounds today. Plan for SLP exam/dysphagia screen once stable off BiPAP. Palliative closely involved with care at this time; pending [facility] discussion. Will continue to follow per RD protocol.     Previous Nutrition Diagnosis:  increased nutrient needs RT increased demand for kcal/pro AEB wt loss, pressure ulcer, catabolic illness  Active [  X ]  Resolved [   ]    If resolved, new PES:     Goal:  Pt to consistently meet % of estimated needs via tolerated route     Recommendations:  1. Once pt passes dysphagia screen, recommend Consistent Carbohydrate Diet with Ensure Enlive TID (1050 kcal, 60g protein, 540mL free H2O)   2. Encourage ONS between meals  3. Trend weights weekly   4. MVI daily  5. Consider resuming appetite stimulant     Education:   Discussed diet advancement pending dysphagia screen     Risk Level: High [  X ] Moderate [   ] Low [   ]

## 2019-05-10 NOTE — PROGRESS NOTE ADULT - SUBJECTIVE AND OBJECTIVE BOX
TIM MCDANIEL             [id number]    CC:  weakness, GOC    HPI:  58 yo G0 w/ known stage IV endometrial carcinoma s/p staging surgery 7/2018 and 1 cycle of chemotherapy in 2/2019 followed by multiple admissions for management of symptomatic enterovaginal fistula, complex fistula associated urinary tract infection, bacteremia presents on referral from [facility] after fever today of 100.7. Pt reports poor PO intake, nausea w/ dry heaving and bringing up phlegm for the past 2 days. She also reports continued weakness. She denies chills, HA, dizziness, CP, palpitations, SOB, abdominal pain, vaginal bleeding, leakage of stool per vagina, abnormal vaginal discharge. She states she was not ambulating daily at [facility] however was working with PT there.    OB/GYN Hx: G0, stage 4 endometrial cancer dx in 7/2018; h/o breast cancer in 2009 s/p chemo and radiation with left breast lumpectomy  PMHx: T2DM, polio in childhood, h/o breast cancer  SHx: left breast lumpectomy, right knee surgery with screw placement 2001, 7/2018 exploratory laparotomy, enterolysis, SHAMIR, BSO, pelvic lymphadenectomy, low anterior resection mobilization of splenic flexure, end colostomy   Allergies: NKDA (29 Apr 2019 23:11)    SUBJECTIVE:  Upper body and upper extremity weakness continues to improve; feels encouraged and in better spirits because of this. Excited that ICU team plans to extubate her imminently and that she will be able to speak to us again. Is enjoying the massage she is receiving at bedside by massage therapist. Denies pain or discomfort at the moment.    ROS:  DYSPNEA: Yes (currently intubated, vent on CPAP w/ pressure support of 7cm H2O)  NAUS/VOM: No  SECRETIONS: Minimal, not currently requiring suctioning	  AGITATION: No  Pain (Y/N): No  -Provocation/Palliation:  -Quality/Quantity:  -Radiating:  -Severity:  -Timing/Frequency:  -Impact on ADLs:    OTHER REVIEW OF SYSTEMS: otherwise negative.  UNABLE TO OBTAIN  due to:    PEx:  T(C): 37.8 (05-10-19 @ 09:58), Max: 37.8 (05-10-19 @ 09:58)  HR: 80 (05-10-19 @ 12:00) (68 - 80)  BP: 142/73 (05-10-19 @ 12:00) (80/49 - 142/73)  RR: 28 (05-10-19 @ 12:00) (11 - 32)  SpO2: 100% (05-10-19 @ 12:00) (93% - 100%)  Wt(kg): --    General: frail female intubated and resting comfortably in bed; appearing older than stated age, pale  HEENT: moderate alopecia; conjunctival pallor; hypophonic speech, moist mucous membranes with scant secretions  Neck: supple  CVS: S1/S2, RRR  Resp: intubated, on CPAP at time of examination w/ pressure support of 7cm H2O; breathing appears comfortable  GI: soft, non-tender  Musc: hypotrophic muscularity and bulk, particularly of UE/hands, LE; profound kyphoscoliosis muscle wasting of left trapezius and latissimus dorsi  Neuro: awake and alert, conversive through hand gestures, lip gestures, nodding or shaking head  Psych: intervally improving depressed mood, euthymic affect; pleasant  Skin: intact    ALLERGIES: IVIG PRODUCT IS PRIGIVEN OR GAMMUNEX (Unknown)  No Known Allergies    OPIATE NAÏVE (Y/N): yes on admission; has received opiates earlier in hospital stay    MEDICATIONS: REVIEWED  MEDICATIONS  (STANDING):  acetaminophen    Suspension .. 650 milliGRAM(s) Oral once  chlorhexidine 0.12% Liquid 15 milliLiter(s) Oral Mucosa every 12 hours  chlorhexidine 2% Cloths 1 Application(s) Topical <User Schedule>  dextrose 5%. 1000 milliLiter(s) (50 mL/Hr) IV Continuous <Continuous>  dextrose 50% Injectable 25 Gram(s) IV Push once  diphenhydrAMINE   Injectable 25 milliGRAM(s) IV Push once  enoxaparin Injectable 40 milliGRAM(s) SubCutaneous every 24 hours  ertapenem  IVPB 1000 milliGRAM(s) IV Intermittent every 24 hours  immune   globulin gamma IVPB 30 Gram(s) IV Intermittent every 24 hours  insulin lispro (HumaLOG) corrective regimen sliding scale   SubCutaneous every 6 hours  nystatin Powder 1 Application(s) Topical three times a day  pantoprazole   Suspension 40 milliGRAM(s) Oral daily  petrolatum Ophthalmic Ointment 1 Application(s) Both EYES three times a day  tamoxifen 20 milliGRAM(s) Oral <User Schedule>  zinc oxide 20% Ointment 1 Application(s) Topical three times a day    MEDICATIONS  (PRN):  acetaminophen   Tablet .. 650 milliGRAM(s) Oral every 6 hours PRN Temp greater or equal to 38C (100.4F), Mild Pain (1 - 3)  glucagon  Injectable 1 milliGRAM(s) IntraMuscular once PRN Glucose LESS THAN 70 milligrams/deciliter      LABS: REVIEWED  CBC:                        7.2    7.96  )-----------( 207      ( 10 May 2019 06:16 )             23.0     CMP:    05-10    136  |  101  |  22  ----------------------------<  186<H>  3.7   |  25  |  0.48<L>    Ca    8.8      10 May 2019 06:16  Phos  2.3     05-09  Mg     2.1     05-10    TPro  7.8  /  Alb  2.6<L>  /  TBili  <0.2  /  DBili  x   /  AST  13  /  ALT  11  /  AlkPhos  74  05-09      IMAGING: REVIEWED    ADVANCED DIRECTIVES:     FULL CODE    DECISION MAKER: patient/self  LEGAL SURROGATE: Esha Avila (sister) [phone]    PSYCHOSOCIAL-SPIRITUAL ASSESSMENT:       Reviewed       Care plan unchanged	    GOALS OF CARE DISCUSSION       Palliative care info/counseling provided	           Family meeting       Advanced Directives addressed please see Advance Care Planning Note	           See previous Palliative Medicine Note       Documentation of GOC: FULL CODE  	      AGENCY CHOICE DISCUSSED:          Not appropriate for discussion at present time    REFERRALS	        Palliative Med        Unit SW/Case Mgmt        - declined       Speech/Swallow - anticipate bedside dysphagia evaluation following extubation today; may need formal S&S eval given current neurologic pathology       Patient/Family Support       Massage Therapy       Music Therapy       Hospice - not applicable at present       Nutrition       PT/OT TIM MCDANIEL             [id number]    CC:  weakness, GOC    HPI:  60 yo G0 w/ known stage IV endometrial carcinoma s/p staging surgery 7/2018 and 1 cycle of chemotherapy in 2/2019 followed by multiple admissions for management of symptomatic enterovaginal fistula, complex fistula associated urinary tract infection, bacteremia presents on referral from [facility] after fever today of 100.7. Pt reports poor PO intake, nausea w/ dry heaving and bringing up phlegm for the past 2 days. She also reports continued weakness. She denies chills, HA, dizziness, CP, palpitations, SOB, abdominal pain, vaginal bleeding, leakage of stool per vagina, abnormal vaginal discharge. She states she was not ambulating daily at [facility] however was working with PT there.    OB/GYN Hx: G0, stage 4 endometrial cancer dx in 7/2018; h/o breast cancer in 2009 s/p chemo and radiation with left breast lumpectomy  PMHx: T2DM, polio in childhood, h/o breast cancer  SHx: left breast lumpectomy, right knee surgery with screw placement 2001, 7/2018 exploratory laparotomy, enterolysis, SHAMIR, BSO, pelvic lymphadenectomy, low anterior resection mobilization of splenic flexure, end colostomy   Allergies: NKDA (29 Apr 2019 23:11)    SUBJECTIVE:  Receiving IVIG  Upper body and upper extremity weakness continues to improve; feels encouraged and in better spirits because of this. Excited that ICU team plans to extubate her imminently and that she will be able to speak to us again. Is enjoying the massage she is receiving at bedside by massage therapist. Denies pain or discomfort at the moment.    ROS:  DYSPNEA: Yes (currently intubated, vent on CPAP w/ pressure support of 7cm H2O)  NAUS/VOM: No  SECRETIONS: Minimal, not currently requiring suctioning	  AGITATION: No  Pain (Y/N): No  -Provocation/Palliation:  -Quality/Quantity:  -Radiating:  -Severity:  -Timing/Frequency:  -Impact on ADLs:    OTHER REVIEW OF SYSTEMS: otherwise negative.  UNABLE TO OBTAIN  due to:    PEx:  T(C): 37.8 (05-10-19 @ 09:58), Max: 37.8 (05-10-19 @ 09:58)  HR: 80 (05-10-19 @ 12:00) (68 - 80)  BP: 142/73 (05-10-19 @ 12:00) (80/49 - 142/73)  RR: 28 (05-10-19 @ 12:00) (11 - 32)  SpO2: 100% (05-10-19 @ 12:00) (93% - 100%)  Wt(kg): --    General: frail female intubated and resting comfortably in bed; appearing older than stated age, pale  HEENT: moderate alopecia; conjunctival pallor; hypophonic speech, moist mucous membranes with scant secretions  Neck: supple  CVS: S1/S2, RRR  Resp: intubated, on CPAP at time of examination w/ pressure support of 7cm H2O; breathing appears comfortable  GI: soft, non-tender  Musc: hypotrophic muscularity and bulk, particularly of UE/hands, LE; profound kyphoscoliosis muscle wasting of left trapezius and latissimus dorsi  Neuro: awake and alert, conversive through hand gestures, lip gestures, nodding or shaking head  Psych: intervally improving depressed mood, euthymic affect; pleasant  Skin: intact    ALLERGIES: IVIG PRODUCT IS PRIGIVEN OR GAMMUNEX (Unknown)  No Known Allergies    OPIATE NAÏVE (Y/N): yes on admission; has received opiates earlier in hospital stay    MEDICATIONS: REVIEWED  MEDICATIONS  (STANDING):  acetaminophen    Suspension .. 650 milliGRAM(s) Oral once  chlorhexidine 0.12% Liquid 15 milliLiter(s) Oral Mucosa every 12 hours  chlorhexidine 2% Cloths 1 Application(s) Topical <User Schedule>  dextrose 5%. 1000 milliLiter(s) (50 mL/Hr) IV Continuous <Continuous>  dextrose 50% Injectable 25 Gram(s) IV Push once  diphenhydrAMINE   Injectable 25 milliGRAM(s) IV Push once  enoxaparin Injectable 40 milliGRAM(s) SubCutaneous every 24 hours  ertapenem  IVPB 1000 milliGRAM(s) IV Intermittent every 24 hours  immune   globulin gamma IVPB 30 Gram(s) IV Intermittent every 24 hours  insulin lispro (HumaLOG) corrective regimen sliding scale   SubCutaneous every 6 hours  nystatin Powder 1 Application(s) Topical three times a day  pantoprazole   Suspension 40 milliGRAM(s) Oral daily  petrolatum Ophthalmic Ointment 1 Application(s) Both EYES three times a day  tamoxifen 20 milliGRAM(s) Oral <User Schedule>  zinc oxide 20% Ointment 1 Application(s) Topical three times a day    MEDICATIONS  (PRN):  acetaminophen   Tablet .. 650 milliGRAM(s) Oral every 6 hours PRN Temp greater or equal to 38C (100.4F), Mild Pain (1 - 3)  glucagon  Injectable 1 milliGRAM(s) IntraMuscular once PRN Glucose LESS THAN 70 milligrams/deciliter      LABS: REVIEWED  CBC:                        7.2    7.96  )-----------( 207      ( 10 May 2019 06:16 )             23.0     CMP:    05-10    136  |  101  |  22  ----------------------------<  186<H>  3.7   |  25  |  0.48<L>    Ca    8.8      10 May 2019 06:16  Phos  2.3     05-09  Mg     2.1     05-10    TPro  7.8  /  Alb  2.6<L>  /  TBili  <0.2  /  DBili  x   /  AST  13  /  ALT  11  /  AlkPhos  74  05-09      IMAGING: REVIEWED    ADVANCED DIRECTIVES:     FULL CODE    DECISION MAKER: patient/self  LEGAL SURROGATE: Esha Avila (sister) [phone]    PSYCHOSOCIAL-SPIRITUAL ASSESSMENT:       Reviewed       Care plan unchanged	    GOALS OF CARE DISCUSSION       Palliative care info/counseling provided	           Family meeting       Advanced Directives addressed please see Advance Care Planning Note	           See previous Palliative Medicine Note       Documentation of GOC: FULL CODE  	      AGENCY CHOICE DISCUSSED:          Not appropriate for discussion at present time    REFERRALS	        Palliative Med        Unit SW/Case Mgmt        - declined       Speech/Swallow - anticipate bedside dysphagia evaluation following extubation today; may need formal S&S eval given current neurologic pathology       Patient/Family Support       Massage Therapy       Music Therapy       Hospice - not applicable at present       Nutrition       PT/OT

## 2019-05-10 NOTE — PROGRESS NOTE ADULT - PROBLEM SELECTOR PLAN 2
Per neurology based on EMG data; IMPROVING now on IVIG.  - per neurology and primary team planned for 4 total days of IVIG w/ [employer].   - appreciate neurology input and management recommendations  - mgmt otherwise as below in problem #4  - prognosis overall remains guarded given her multiple chronic comorbidities and the prospect of cytotoxic chemotherapy initiation for her malignancy

## 2019-05-10 NOTE — PROGRESS NOTE ADULT - PROBLEM SELECTOR PLAN 4
Per neurology, whether or not PPS is present should not be affecting her ascending/upper extremity weakness and respiratory insufficiency and is better explained by AIDP vs. CIDP  - mgmt otherwise as above in problem #2 and #3 Per neurology, whether or not PPS is present should not be affecting her ascending/upper extremity weakness and respiratory insufficiency and is better explained by AIDP vs. CIDP  - mgmt otherwise as above in problem #2 and #3  -Patient disappointed that she has not noticed improvement in LE strength

## 2019-05-10 NOTE — PROGRESS NOTE ADULT - PROBLEM SELECTOR PLAN 3
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

## 2019-05-11 LAB
GLUCOSE BLDC GLUCOMTR-MCNC: 83 MG/DL — SIGNIFICANT CHANGE UP (ref 70–99)
GLUCOSE BLDC GLUCOMTR-MCNC: 88 MG/DL — SIGNIFICANT CHANGE UP (ref 70–99)
GLUCOSE BLDC GLUCOMTR-MCNC: 90 MG/DL — SIGNIFICANT CHANGE UP (ref 70–99)
GLUCOSE BLDC GLUCOMTR-MCNC: 96 MG/DL — SIGNIFICANT CHANGE UP (ref 70–99)

## 2019-05-11 PROCEDURE — 99291 CRITICAL CARE FIRST HOUR: CPT

## 2019-05-11 RX ORDER — ROBINUL 0.2 MG/ML
0.2 INJECTION INTRAMUSCULAR; INTRAVENOUS ONCE
Refills: 0 | Status: COMPLETED | OUTPATIENT
Start: 2019-05-11 | End: 2019-05-11

## 2019-05-11 RX ORDER — ROBINUL 0.2 MG/ML
0.4 INJECTION INTRAMUSCULAR; INTRAVENOUS ONCE
Refills: 0 | Status: COMPLETED | OUTPATIENT
Start: 2019-05-11 | End: 2019-05-11

## 2019-05-11 RX ADMIN — ROBINUL 0.4 MILLIGRAM(S): 0.2 INJECTION INTRAMUSCULAR; INTRAVENOUS at 14:50

## 2019-05-11 RX ADMIN — NYSTATIN CREAM 1 APPLICATION(S): 100000 CREAM TOPICAL at 06:25

## 2019-05-11 RX ADMIN — CHLORHEXIDINE GLUCONATE 1 APPLICATION(S): 213 SOLUTION TOPICAL at 06:25

## 2019-05-11 RX ADMIN — ZINC OXIDE 1 APPLICATION(S): 200 OINTMENT TOPICAL at 21:29

## 2019-05-11 RX ADMIN — NYSTATIN CREAM 1 APPLICATION(S): 100000 CREAM TOPICAL at 21:29

## 2019-05-11 RX ADMIN — ROBINUL 0.2 MILLIGRAM(S): 0.2 INJECTION INTRAMUSCULAR; INTRAVENOUS at 12:17

## 2019-05-11 RX ADMIN — Medication 1 APPLICATION(S): at 14:51

## 2019-05-11 RX ADMIN — ERTAPENEM SODIUM 120 MILLIGRAM(S): 1 INJECTION, POWDER, LYOPHILIZED, FOR SOLUTION INTRAMUSCULAR; INTRAVENOUS at 21:27

## 2019-05-11 RX ADMIN — Medication 1 APPLICATION(S): at 06:25

## 2019-05-11 RX ADMIN — TAMOXIFEN CITRATE 20 MILLIGRAM(S): 20 TABLET, FILM COATED ORAL at 06:26

## 2019-05-11 RX ADMIN — ZINC OXIDE 1 APPLICATION(S): 200 OINTMENT TOPICAL at 06:25

## 2019-05-11 RX ADMIN — ZINC OXIDE 1 APPLICATION(S): 200 OINTMENT TOPICAL at 14:51

## 2019-05-11 RX ADMIN — NYSTATIN CREAM 1 APPLICATION(S): 100000 CREAM TOPICAL at 14:00

## 2019-05-11 RX ADMIN — TAMOXIFEN CITRATE 20 MILLIGRAM(S): 20 TABLET, FILM COATED ORAL at 18:02

## 2019-05-11 RX ADMIN — Medication 1 APPLICATION(S): at 21:29

## 2019-05-11 RX ADMIN — PANTOPRAZOLE SODIUM 40 MILLIGRAM(S): 20 TABLET, DELAYED RELEASE ORAL at 12:17

## 2019-05-11 NOTE — PROGRESS NOTE ADULT - SUBJECTIVE AND OBJECTIVE BOX
GYN PROGRESS NOTE    Patient evaluated at the bedside. No acute events.  Denies CP/SOB/dizziness/nausea/vomiting/abdominal pain/calf pain. Patient wants to continue sleeping. Joseph in place.      O:   T(C): 36.9 (05-11-19 @ 06:09), Max: 37.8 (05-10-19 @ 09:58)  HR: 94 (05-11-19 @ 08:00) (66 - 98)  BP: 106/66 (05-11-19 @ 08:00) (91/58 - 142/73)  RR: 20 (05-11-19 @ 08:00) (14 - 33)  SpO2: 100% (05-11-19 @ 08:00) (97% - 100%)  Wt(kg): --    GEN: resting, no acute distress   LUNGS: no respiratory distress, on BiPAP via NC   ABD: soft, nontender, ostomy pink and warm, well perfused with stool in appliance   Pelvic: joseph draining clear urine   EXT: no calf tenderness, SCDs in place         05-10 @ 07:01  -  05-11 @ 07:00  --------------------------------------------------------  IN: 1152 mL / OUT: 1050 mL / NET: 102 mL    05-11 @ 07:01  -  05-11 @ 09:06  --------------------------------------------------------  IN: 0 mL / OUT: 50 mL / NET: -50 mL

## 2019-05-11 NOTE — PROGRESS NOTE ADULT - ATTENDING COMMENTS
The patient continues to have high level of secretions. We brought up the prospect of need for intubation which she states she does not want and specifically tells us not to call her sister sEha.  For ow will try glycopyrrholate to see if this helps secretion burden. She is holding her own on the respiratory front but her status is clearly fragile.  Critical care time rendered 45 minutes

## 2019-05-11 NOTE — PROGRESS NOTE ADULT - SUBJECTIVE AND OBJECTIVE BOX
Incomplete note    INTERVAL HPI/OVERNIGHT EVENTS:    OVERNIGHT: No overnight events.  SUBJECTIVE: Patient seen and examined at bedside.     ROS:  CV: Denies chest pain  Resp: Denies SOB  GI: Denies abdominal pain, constipation, diarrhea, nausea, vomiting  : Denies dysuria  ID: Denies fevers, chills  MSK: Denies joint pain     OBJECTIVE:    VITAL SIGNS:  ICU Vital Signs Last 24 Hrs  T(C): 36.7 (11 May 2019 01:27), Max: 37.8 (10 May 2019 09:58)  T(F): 98 (11 May 2019 01:27), Max: 100.1 (10 May 2019 09:58)  HR: 88 (11 May 2019 05:42) (66 - 98)  BP: 117/70 (11 May 2019 05:00) (91/58 - 142/73)  BP(mean): 87 (11 May 2019 05:00) (73 - 101)  ABP: --  ABP(mean): --  RR: 17 (11 May 2019 05:42) (14 - 33)  SpO2: 97% (11 May 2019 05:42) (97% - 100%)    Mode: CPAP with PS, FiO2: 35, PEEP: 5, PS: 8, MAP: 7.2, PIP: 13    05-09 @ 07:01  -  05-10 @ 07:00  --------------------------------------------------------  IN: 1810 mL / OUT: 1515 mL / NET: 295 mL    05-10 @ 07:01 - 05-11 @ 05:58  --------------------------------------------------------  IN: 1152 mL / OUT: 940 mL / NET: 212 mL      CAPILLARY BLOOD GLUCOSE      POCT Blood Glucose.: 105 mg/dL (10 May 2019 23:08)      PHYSICAL EXAM:  General: NAD, comfortable  HEENT: NCAT, PERRL, clear conjunctiva, no scleral icterus  Neck: supple, no JVD  Respiratory: CTA b/l, no wheezing, rhonchi, rales  Cardiovascular: RRR, normal S1S2, no M/R/G  Vascular: 2+ radial and DP pulses  Abdomen: soft, NT/ND, bowel sounds in all four quadrants, no palpable masses  Extremities: WWP, no clubbing, cyanosis, or edema  Skin: No rashes present  Neuro:     MEDICATIONS:  MEDICATIONS  (STANDING):  chlorhexidine 2% Cloths 1 Application(s) Topical <User Schedule>  dextrose 5%. 1000 milliLiter(s) (50 mL/Hr) IV Continuous <Continuous>  dextrose 50% Injectable 25 Gram(s) IV Push once  enoxaparin Injectable 40 milliGRAM(s) SubCutaneous every 24 hours  ertapenem  IVPB 1000 milliGRAM(s) IV Intermittent every 24 hours  insulin lispro (HumaLOG) corrective regimen sliding scale   SubCutaneous every 6 hours  nystatin Powder 1 Application(s) Topical three times a day  pantoprazole    Tablet 40 milliGRAM(s) Oral daily  petrolatum Ophthalmic Ointment 1 Application(s) Both EYES three times a day  tamoxifen 20 milliGRAM(s) Oral two times a day  zinc oxide 20% Ointment 1 Application(s) Topical three times a day    MEDICATIONS  (PRN):  acetaminophen   Tablet .. 650 milliGRAM(s) Oral every 6 hours PRN Temp greater or equal to 38C (100.4F), Mild Pain (1 - 3)  glucagon  Injectable 1 milliGRAM(s) IntraMuscular once PRN Glucose LESS THAN 70 milligrams/deciliter      ALLERGIES:  Allergies    No Known Allergies    Intolerances    IVIG PRODUCT IS PRIGIVEN OR GAMMUNEX (Unknown)      LABS:                        7.2    7.96  )-----------( 207      ( 10 May 2019 06:16 )             23.0     05-10    136  |  101  |  22  ----------------------------<  186<H>  3.7   |  25  |  0.48<L>    Ca    8.8      10 May 2019 06:16  Mg     2.1     05-10            RADIOLOGY & ADDITIONAL TESTS: Reviewed. INTERVAL HPI/OVERNIGHT EVENTS: extubated. on nasal bipap overnight. passed bedside dysphagia, but does not feel hungry    SUBJECTIVE: Patient seen and examined at bedside. Feels stronger everyday. Able to suctions her increased secretions herself. States she hasn't felt this strong in months.    ROS:  CV: Denies chest pain  Resp: Denies SOB  GI: Denies abdominal pain, constipation, diarrhea, nausea, vomiting  : Denies dysuria  ID: Denies fevers, chills  MSK: Denies joint pain     OBJECTIVE:    VITAL SIGNS:  ICU Vital Signs Last 24 Hrs  T(C): 36.7 (11 May 2019 01:27), Max: 37.8 (10 May 2019 09:58)  T(F): 98 (11 May 2019 01:27), Max: 100.1 (10 May 2019 09:58)  HR: 88 (11 May 2019 05:42) (66 - 98)  BP: 117/70 (11 May 2019 05:00) (91/58 - 142/73)  BP(mean): 87 (11 May 2019 05:00) (73 - 101)  ABP: --  ABP(mean): --  RR: 17 (11 May 2019 05:42) (14 - 33)  SpO2: 97% (11 May 2019 05:42) (97% - 100%)    Mode: CPAP with PS, FiO2: 35, PEEP: 5, PS: 8, MAP: 7.2, PIP: 13    05-09 @ 07:01 - 05-10 @ 07:00  --------------------------------------------------------  IN: 1810 mL / OUT: 1515 mL / NET: 295 mL    05-10 @ 07:01 - 05-11 @ 05:58  --------------------------------------------------------  IN: 1152 mL / OUT: 940 mL / NET: 212 mL      CAPILLARY BLOOD GLUCOSE      POCT Blood Glucose.: 105 mg/dL (10 May 2019 23:08)      PHYSICAL EXAM:  General: NAD, comfortable w nasal cannula  HEENT: NCAT, PERRL, clear conjunctiva, no scleral icterus, NG tube in place  Neck: supple, no JVD  Respiratory: CTA b/l, no wheezing, rhonchi, rales  Cardiovascular: RRR, normal S1S2, no M/R/G  Vascular: 1+ radial and DP pulses  Abdomen: soft, NT/ND, bowel sounds present, colostomy bag w hard stool and pink stoma, large central scar  : joseph in place w yellow urine  Extremities: WWP, no clubbing, cyanosis, or edema  Skin: No rashes present  Neuro:  strength 4/5, improved strength throughout. CN 2-12 intact.      MEDICATIONS:  MEDICATIONS  (STANDING):  chlorhexidine 2% Cloths 1 Application(s) Topical <User Schedule>  dextrose 5%. 1000 milliLiter(s) (50 mL/Hr) IV Continuous <Continuous>  dextrose 50% Injectable 25 Gram(s) IV Push once  enoxaparin Injectable 40 milliGRAM(s) SubCutaneous every 24 hours  ertapenem  IVPB 1000 milliGRAM(s) IV Intermittent every 24 hours  insulin lispro (HumaLOG) corrective regimen sliding scale   SubCutaneous every 6 hours  nystatin Powder 1 Application(s) Topical three times a day  pantoprazole    Tablet 40 milliGRAM(s) Oral daily  petrolatum Ophthalmic Ointment 1 Application(s) Both EYES three times a day  tamoxifen 20 milliGRAM(s) Oral two times a day  zinc oxide 20% Ointment 1 Application(s) Topical three times a day    MEDICATIONS  (PRN):  acetaminophen   Tablet .. 650 milliGRAM(s) Oral every 6 hours PRN Temp greater or equal to 38C (100.4F), Mild Pain (1 - 3)  glucagon  Injectable 1 milliGRAM(s) IntraMuscular once PRN Glucose LESS THAN 70 milligrams/deciliter      ALLERGIES:  Allergies    No Known Allergies    Intolerances    IVIG PRODUCT IS PRIGIVEN OR GAMMUNEX (Unknown)      LABS:                        7.2    7.96  )-----------( 207      ( 10 May 2019 06:16 )             23.0     05-10    136  |  101  |  22  ----------------------------<  186<H>  3.7   |  25  |  0.48<L>    Ca    8.8      10 May 2019 06:16  Mg     2.1     05-10            RADIOLOGY & ADDITIONAL TESTS: Reviewed.

## 2019-05-11 NOTE — PROGRESS NOTE ADULT - ASSESSMENT
58yo F PMHx polio, breast cancer, DM, Endometrial Cancer s/p exploratory laparotomy, enterolysis, SHAMIR, BSO, pelvic lymphadenectomy, low anterior resection mobilization of splenic flexure, end colostomy on 7/30/18, rectovaginal fistula, numerous admissions for urinary tract infection presented to [facility] in the setting of urosepsis. Hospital course complicated by blood stream infection (currently on ertapenem) and respiratory failure requiring intubation s/p extubation on 5/2. Intubated urgently on 7 lachman for CO2 narcosis in setting of AIDP and stepped up to MICU, now improving s/p IVIG and extubated to bipap on 5/10.    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)  resolved, extubated to bipap on 5/10  - Continue treatment w IVIG per neurology for one more dose, then will need another round of 4 doses in one month      #hx of urinary retention  - retaining on 5/8, so placed joseph  - might need SP catheter  - appreciate urology recs    Neurology  #Acute polyneuropathy  - improving  - patient w likely AIDP leading to respiratory compromise  - previous EMG w demyelinating polyneuropathy  - recs per neurology  - s/p 3 rounds of IVIG, will give one more today  - ordered mr brain w/wo contrast and MR cervical spine w/wo contrast      INFECTIOUS DISEASE  #hx of recurrent ESBL ecoli UTI and bacteremia  - previously on admission patient admitted to MICU in septic shock 2/2 to ESBL ecoli bactermia and UTI  - continue ertepenem daily per ID recs    HEME/ONC  #Stage IV endometrial adenocarcinoma  per GYN-ONC w/ interval increase in tumor burden. Pt was treated with Anastrazole and initiated 3 weeks of megace 80mg BID followed by 3 weeks of tamoxifen. started megace and metformin 4/10, on Tamoxifen for 21 days (5/1/19-), now w crushed version    FLUIDS/ELECTROLYTES/NUTRITION  -IVF: none  -Monitor, Replete to K>4 and Mg>2  -Diet: tube feeds    PROPHYLAXIS  -DVT: lovenox and SCDs  -GI: protonix    DISPO: MICU  CODE STATUS: full code; palliative following regarding GOC 60yo F PMHx polio, breast cancer, DM, Endometrial Cancer s/p exploratory laparotomy, enterolysis, SHAMIR, BSO, pelvic lymphadenectomy, low anterior resection mobilization of splenic flexure, end colostomy on 7/30/18, rectovaginal fistula, numerous admissions for urinary tract infection presented to [facility] in the setting of urosepsis. Hospital course complicated by blood stream infection (currently on ertapenem) and respiratory failure requiring intubation s/p extubation on 5/2. Intubated urgently on 7 lachman for CO2 narcosis in setting of AIDP and stepped up to MICU, now improving s/p IVIG and extubated to bipap on 5/10.    CARDIOVASCULAR  #hemodynamics  Hd stable  - making appropriate amounts of urine, mentating well, warm extremities    PULMONARY  #Acute hypercapneic respiratory failure 2/2 to demyelinating polyneuropathy (AIDP)  improved, extubated to bipap on 5/10  - s/p 4 doses of IVIG  - per neurology, will need another round of 4 doses in two weeks  - increased upper airway secretions on 5/11 - will trial glycopyrrolate IV  - pulmonary percussion tx       #hx of urinary retention  - retaining on 5/8, so placed joseph  - might need SP catheter  - appreciate urology recs    Neurology  #AIDP  - improving  - patient w AIDP leading to respiratory compromise  - previous EMG w demyelinating polyneuropathy  - recs per neurology  - s/p 4 rounds of IVIG, with dramatic improvement in strength and respiratory status  - f/u mr brain w/wo contrast and MR cervical spine w/wo contrast when patient stable to go for imaging      INFECTIOUS DISEASE  #hx of recurrent ESBL ecoli UTI and bacteremia  - previously on admission patient admitted to MICU in septic shock 2/2 to ESBL ecoli bactermia and UTI  - continue ertepenem daily per ID recs through 5/14    HEME/ONC  #Stage IV endometrial adenocarcinoma  per GYN-ONC w/ interval increase in tumor burden. Pt was treated with Anastrazole and initiated 3 weeks of megace 80mg BID followed by 3 weeks of tamoxifen. started megace and metformin 4/10, on Tamoxifen for 21 days (5/1/19-), now w crushed version    FLUIDS/ELECTROLYTES/NUTRITION  -IVF: none  -Monitor, Replete to K>4 and Mg>2  -Diet: tube feeds and PO diet as tolerated    PROPHYLAXIS  -DVT: lovenox and SCDs  -GI: protonix    DISPO: MICU  CODE STATUS: full code; palliative following regarding GOC

## 2019-05-11 NOTE — PROGRESS NOTE ADULT - ASSESSMENT
60yo F HD13 with stage IV endometrial adenocarcinoma s/p staging surgery '18 and 1 round of chemotherapy 2/19 readmitted from [facility] with fever, previously admitted for management of symptomatic rectovaginal and enterovaginal fistulas. Presents on referral from [facility] with urosepsis.  Rapid response called due to worsening respiratory status s/p intubation and MICU admission. She then was on regional GYN service however had respiratory weakness and was transferred to Medicine/tele HD9 until requiring intubation again HD10 and is now in MICU. 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     1. Neuro: Extubated, Likely Guillain barre- now status post IVIG 4 days course, neurology following-f/u recs, CSF culture no organisms seen, MRI when stable, per neurology will treat with IVIG x2 days every 2wks  2. Pulm: Extubated 5/10 - transitioned to BiPAP, now switched to nasal canula with humidified air, management per MICU, glycopyrrolate for help with secretions   3. Cardio: Vitals improved, off pressor support  4. FEN/GI: dobhoff, on tube feeds, tolerated water today   5. : joseph replaced 5/8 after found to have overflow incontinence, f/u urology recs regarding suprapubic catheter   6. ID: IV ertapenem until 5/14, s/p meropenem, s/p vanc, appreciate ID recs   7. Endocrine: FS, ISS, Metformin 1000mg BID held at this time   8. VTE prophylaxis - SCDs, Lovenox 40mg daily   9. GYN malignancy  -stopped anastrazole and initiated 3 weeks of megace 80mg BID followed by 3 weeks of tamoxifen. started megace and metformin 4/10, now on Tamoxifen for 21 days (5/1/19-  not taking pills at this time but on crushed version of tamoxifen, avoid immunotherapy at this time given associated risk of autoimmune disease   10. Derm: monitor sacrum for s/s of pressure ulcer. now stage 2  11. PT/OT needs evaluation, OOB today with nursing staff  12. Social work/palliative: palliative care following. dispo plan to acute rehab when stable.

## 2019-05-11 NOTE — PROGRESS NOTE ADULT - SUBJECTIVE AND OBJECTIVE BOX
GYN Progress Note    Patient seen at bedside this afternoon. Nasal cannula in place. Denies pain at this time. States she feels better today, but remains tired. Able to speak few words when questioned. States she has increased secretions improves with cough and suctioning.  Tolerated sips of water and was moved to chair by nursing today.  Denies CP, palpitations, SOB, fever, chills, nausea, vomiting.    Vital Signs Last 24 Hrs  T(C): 37.4 (11 May 2019 13:00), Max: 37.5 (10 May 2019 14:29)  T(F): 99.4 (11 May 2019 13:00), Max: 99.5 (10 May 2019 14:29)  HR: 102 (11 May 2019 13:00) (66 - 106)  BP: 118/64 (11 May 2019 12:00) (91/58 - 121/74)  BP(mean): 86 (11 May 2019 12:00) (73 - 102)  RR: 27 (11 May 2019 13:00) (14 - 33)  SpO2: 99% (11 May 2019 13:00) (96% - 100%)    PHYSICAL EXAM:  GEN: resting comfortable, no acute distress, A&O x3  LUNGS: no respiratory distress, nasal canula in place, expiratory crackles in upper lung fields bilaterally  ABD: soft, nontender, ostomy pink and warm, well perfused, brown stool in bag  Pelvic: joseph draining clear urine   EXT: no calf tenderness, SCDs in place     I&O's Summary    10 May 2019 07:01  -  11 May 2019 07:00  --------------------------------------------------------  IN: 1152 mL / OUT: 1050 mL / NET: 102 mL    11 May 2019 07:01  -  11 May 2019 13:18  --------------------------------------------------------  IN: 44 mL / OUT: 185 mL / NET: -141 mL      MEDICATIONS  (STANDING):  chlorhexidine 2% Cloths 1 Application(s) Topical <User Schedule>  dextrose 5%. 1000 milliLiter(s) (50 mL/Hr) IV Continuous <Continuous>  dextrose 50% Injectable 25 Gram(s) IV Push once  enoxaparin Injectable 40 milliGRAM(s) SubCutaneous every 24 hours  ertapenem  IVPB 1000 milliGRAM(s) IV Intermittent every 24 hours  insulin lispro (HumaLOG) corrective regimen sliding scale   SubCutaneous every 6 hours  nystatin Powder 1 Application(s) Topical three times a day  pantoprazole    Tablet 40 milliGRAM(s) Oral daily  petrolatum Ophthalmic Ointment 1 Application(s) Both EYES three times a day  tamoxifen 20 milliGRAM(s) Oral two times a day  zinc oxide 20% Ointment 1 Application(s) Topical three times a day    MEDICATIONS  (PRN):  acetaminophen   Tablet .. 650 milliGRAM(s) Oral every 6 hours PRN Temp greater or equal to 38C (100.4F), Mild Pain (1 - 3)  glucagon  Injectable 1 milliGRAM(s) IntraMuscular once PRN Glucose LESS THAN 70 milligrams/deciliter      LABS:                        7.2    7.96  )-----------( 207      ( 10 May 2019 06:16 )             23.0     05-10    136  |  101  |  22  ----------------------------<  186<H>  3.7   |  25  |  0.48<L>    Ca    8.8      10 May 2019 06:16  Mg     2.1     05-10

## 2019-05-11 NOTE — PROGRESS NOTE ADULT - ASSESSMENT
60yo F HD13 with stage IV endometrial adenocarcinoma s/p staging surgery '18 and 1 round of chemotherapy 2/19 readmitted from [facility] with fever, previously admitted for management of symptomatic rectovaginal and enterovaginal fistulas. Presents on referral from [facility] with urosepsis.  Rapid response called due to worsening respiratory status s/p intubation and MICU admission. She then was on regional GYN service however had respiratory weakness and was transferred to Medicine/tele HD9 until requiring intubation again HD10 and is now in the ICU. 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     1. Neuro: Extubated, responds appropriately to questions. Likely guillian barre- now status post IVIG 3 days (5/8). Follow up neuro recs. f/u labs. LP 5/8.   2. Pulm: Extubated 5/10 - continue BIPAP via nasal canula per MICU.   3. Cardio: Vitals improved, off pressor support  4. FEN/GI: dobhoff, on tube feeds  5. : joseph replaced 5/8 after found to have overflow incontinence  6. ID: IV ertapenem until 5/14, s/p meropenem, s/p vanc  7. Endocrine: FS, ISS, Metformin 1000mg BID  8. VTE prophylaxis - SCDs, Lovenox 40mg daily   9. GYN malignancy  -stopped anastrazole and initiated 3 weeks of megace 80mg BID followed by 3 weeks of tamoxifen. started megace and metformin 4/10, now on Tamoxifen for 21 days (5/1/19-  not taking pills at this time but on crushed version of tamoxifen  10. Derm: monitor sacrum for s/s of pressure ulcer. now stage 2.  11. Social work/palliative: palliative care following. dispo plan to acute rehab when stable.

## 2019-05-11 NOTE — PROGRESS NOTE ADULT - SUBJECTIVE AND OBJECTIVE BOX
Neurology Progress note      Pt is s/p 4 doses of IVIG (total 2g/kg).  Doing better, extubated.  Swallowing improving.      On exam, awake, alert, cough but stable respiration  PERRL, improved EOM wo nystag, strong eye closure  4-4+/5 in upper extremities  2/5 in LE's   areflexic     A/P  Relapsing AIDP.  Improving exam.    Given relapse occurred less than a month following the last one, will need to continue IVIG (Privigen or Gammunex) at a dose of 0.5g/kg/day x 2days, given every 2 weeks as opposed to monthly.  MR brain and C spine w and wo con when stable  Management of cancer as per her gyn team.  Ok with chemotherapy, however would advise against an immunotherapy such as checkpoint inhibitors given assoc risk of autoimmune disease  Management of medical issues as per ICU team

## 2019-05-12 LAB
GLUCOSE BLDC GLUCOMTR-MCNC: 136 MG/DL — HIGH (ref 70–99)
GLUCOSE BLDC GLUCOMTR-MCNC: 142 MG/DL — HIGH (ref 70–99)
GLUCOSE BLDC GLUCOMTR-MCNC: 187 MG/DL — HIGH (ref 70–99)
GLUCOSE BLDC GLUCOMTR-MCNC: 83 MG/DL — SIGNIFICANT CHANGE UP (ref 70–99)
GLUCOSE BLDC GLUCOMTR-MCNC: 83 MG/DL — SIGNIFICANT CHANGE UP (ref 70–99)

## 2019-05-12 PROCEDURE — 99233 SBSQ HOSP IP/OBS HIGH 50: CPT | Mod: GC

## 2019-05-12 RX ADMIN — NYSTATIN CREAM 1 APPLICATION(S): 100000 CREAM TOPICAL at 13:01

## 2019-05-12 RX ADMIN — ENOXAPARIN SODIUM 40 MILLIGRAM(S): 100 INJECTION SUBCUTANEOUS at 00:03

## 2019-05-12 RX ADMIN — Medication 1 APPLICATION(S): at 06:07

## 2019-05-12 RX ADMIN — Medication 1 APPLICATION(S): at 13:00

## 2019-05-12 RX ADMIN — ZINC OXIDE 1 APPLICATION(S): 200 OINTMENT TOPICAL at 06:07

## 2019-05-12 RX ADMIN — PANTOPRAZOLE SODIUM 40 MILLIGRAM(S): 20 TABLET, DELAYED RELEASE ORAL at 13:00

## 2019-05-12 RX ADMIN — TAMOXIFEN CITRATE 20 MILLIGRAM(S): 20 TABLET, FILM COATED ORAL at 18:48

## 2019-05-12 RX ADMIN — Medication 2: at 23:24

## 2019-05-12 RX ADMIN — ENOXAPARIN SODIUM 40 MILLIGRAM(S): 100 INJECTION SUBCUTANEOUS at 23:25

## 2019-05-12 RX ADMIN — CHLORHEXIDINE GLUCONATE 1 APPLICATION(S): 213 SOLUTION TOPICAL at 06:06

## 2019-05-12 RX ADMIN — ZINC OXIDE 1 APPLICATION(S): 200 OINTMENT TOPICAL at 21:05

## 2019-05-12 RX ADMIN — ERTAPENEM SODIUM 120 MILLIGRAM(S): 1 INJECTION, POWDER, LYOPHILIZED, FOR SOLUTION INTRAMUSCULAR; INTRAVENOUS at 21:05

## 2019-05-12 RX ADMIN — ZINC OXIDE 1 APPLICATION(S): 200 OINTMENT TOPICAL at 13:01

## 2019-05-12 RX ADMIN — Medication 1 APPLICATION(S): at 21:06

## 2019-05-12 RX ADMIN — TAMOXIFEN CITRATE 20 MILLIGRAM(S): 20 TABLET, FILM COATED ORAL at 06:06

## 2019-05-12 RX ADMIN — NYSTATIN CREAM 1 APPLICATION(S): 100000 CREAM TOPICAL at 06:07

## 2019-05-12 RX ADMIN — NYSTATIN CREAM 1 APPLICATION(S): 100000 CREAM TOPICAL at 21:06

## 2019-05-12 NOTE — PROGRESS NOTE ADULT - ASSESSMENT
60yo F HD13 with stage IV endometrial adenocarcinoma s/p staging surgery '18 and 1 round of chemotherapy 2/19 readmitted from [facility] with fever, previously admitted for management of symptomatic rectovaginal and enterovaginal fistulas. Presents on referral from [facility] with urosepsis.  Rapid response called due to worsening respiratory status s/p intubation and MICU admission. She then was on regional GYN service however had respiratory weakness and was transferred to Medicine/tele HD9 until requiring intubation again HD10 and is now in MICU. 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 Extubated on NC and improving now.  Management per MICU    1. Neuro: weakness Likely Guillain [place]- now status post IVIG 4 days course, neurology following-appreciate recs, CSF culture no organisms seen, MRI when stable, per neurology will treat with IVIG x2 days every 2wks  2. Pulm: Extubated 5/10 - transitioned to BiPAP initially now on nasal canula 4L, management per MICU, glycopyrrolate given to help dec secretions- improved overnight   3. Cardio: Vitals improved, off pressor support  4. FEN/GI: dobhoff, on tube feeds, TF were paused yesterday evening given concern for possible aspiration, possibly to restart TF today based on dysphagia testing, tolerated water today with pill , recommend labs to monitor electrolytes   5. : joseph replaced 5/8 after found to have overflow incontinence, f/u urology recs regarding suprapubic catheter   6. ID: IV ertapenem until 5/14, s/p meropenem, s/p vanc, appreciate ID recs , appears to have Right Conjunctivitis recommend abx drop treatment   7. Endocrine: FS, ISS, Metformin 1000mg BID held at this time   8. VTE prophylaxis - SCDs, Lovenox 40mg daily   9. GYN malignancy  -stopped anastrazole and initiated 3 weeks of megace 80mg BID followed by 3 weeks of tamoxifen. started megace and metformin 4/10, now on Tamoxifen for 21 days (5/1/19-  not taking pills at this time but on crushed version of tamoxifen, avoid immunotherapy at this time given associated risk of autoimmune disease   10. Derm: monitor sacrum for s/s of pressure ulcer, now stage 2  11. PT/OT needs evaluation, OOB with nursing staff  12. Social work/palliative: palliative care following. dispo plan to acute rehab when stable.

## 2019-05-12 NOTE — PROGRESS NOTE ADULT - SUBJECTIVE AND OBJECTIVE BOX
GYN Progress Note    Seen this morning, resting comfortable in bed. Conversing and asking when NG tube can come out. States she is feeling stronger today. Denies any difficulty breathing and reports a decrease in secretions overnight. Denies any pain. Tolerated sip of water with medicine. Denies CP, palpitations, SOB, fever, chills, nausea, vomiting.    Vital Signs Last 24 Hrs  T(C): 36.1 (12 May 2019 01:03), Max: 37.4 (11 May 2019 13:00)  T(F): 97 (12 May 2019 01:03), Max: 99.4 (11 May 2019 13:00)  HR: 94 (12 May 2019 06:15) (81 - 116)  BP: 107/71 (12 May 2019 06:00) (92/68 - 121/74)  BP(mean): 86 (12 May 2019 06:00) (68 - 102)  RR: 24 (12 May 2019 06:15) (11 - 33)  SpO2: 100% (12 May 2019 06:15) (84% - 100%)    Physical Exam:  Gen: No Acute Distress, resting comfortable in bed, A&Ox3, NG in place, NC in place  EYE: right eyelid swelling, mucous drainage and red sclera   LUNGS: no respiratory distress, nasal canula in place, expiratory crackles in upper lung fields bilaterally  ABD: soft, nontender, ostomy pink and warm, well perfused, brown stool in bag  Pelvic: joseph draining clear urine   EXT: no calf tenderness, SCDs in place     I&O's Summary    10 May 2019 07:01  -  11 May 2019 07:00  --------------------------------------------------------  IN: 1152 mL / OUT: 1050 mL / NET: 102 mL    11 May 2019 07:01  -  12 May 2019 06:27  --------------------------------------------------------  IN: 182 mL / OUT: 1305 mL / NET: -1123 mL      MEDICATIONS  (STANDING):  chlorhexidine 2% Cloths 1 Application(s) Topical <User Schedule>  dextrose 5%. 1000 milliLiter(s) (50 mL/Hr) IV Continuous <Continuous>  dextrose 50% Injectable 25 Gram(s) IV Push once  enoxaparin Injectable 40 milliGRAM(s) SubCutaneous every 24 hours  ertapenem  IVPB 1000 milliGRAM(s) IV Intermittent every 24 hours  insulin lispro (HumaLOG) corrective regimen sliding scale   SubCutaneous every 6 hours  nystatin Powder 1 Application(s) Topical three times a day  pantoprazole    Tablet 40 milliGRAM(s) Oral daily  petrolatum Ophthalmic Ointment 1 Application(s) Both EYES three times a day  tamoxifen 20 milliGRAM(s) Oral two times a day  zinc oxide 20% Ointment 1 Application(s) Topical three times a day    MEDICATIONS  (PRN):  acetaminophen   Tablet .. 650 milliGRAM(s) Oral every 6 hours PRN Temp greater or equal to 38C (100.4F), Mild Pain (1 - 3)  glucagon  Injectable 1 milliGRAM(s) IntraMuscular once PRN Glucose LESS THAN 70 milligrams/deciliter      LABS:

## 2019-05-12 NOTE — PROGRESS NOTE ADULT - ASSESSMENT
60yo F HD13 with stage IV endometrial adenocarcinoma s/p staging surgery '18 and 1 round of chemotherapy 2/19 readmitted from [facility] with fever, previously admitted for management of symptomatic rectovaginal and enterovaginal fistulas. Presents on referral from [facility] with urosepsis.  Rapid response called due to worsening respiratory status s/p intubation and MICU admission. She then was on regional GYN service however had respiratory weakness and was transferred to Medicine/tele HD9 until requiring intubation again HD10 and is now in MICU. 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 Extubated on NC and improving now.  Management per MICU    1. Neuro: weakness secondary Guillain [place] versus exacerbation of AIDP- now status post IVIG x4 day course. Neurology following-appreciate recs. CSF culture no organisms seen, MRI when stable, per neurology will treat with IVIG x2 days every 2wks  2. Pulm: Extubated 5/10 - transitioned to BiPAP initially, now on nasal canula 4L.  Glycopyrrolate given to help decrease secretions which are improved. Pulmonary percussion Tx per medicine team.   3. Cardio: Vitals improved, off pressor support. Appropriate urine output.   4. FEN/GI: Dobhoff in situ, on tube feeds.  - TF were paused yesterday evening given concern for possible aspiration  - Patient passed dysphagia testing.  - Recommend daily labs with ongoing electrolytes repletions PRN.    5. : Adequate urine output, joseph in place.  - Joseph replaced 5/8 after finding of overflow incontinence, f/u urology recs regarding suprapubic catheter   6. ID: IV ertapenem until 5/14, s/p meropenem, s/p vanc, appreciate ID recs.   - Patient appears to have Right Conjunctivitis recommend abx drop treatment   7. Endocrine: FS, ISS, Metformin 1000mg BID held at this time   8. VTE prophylaxis - SCDs, Lovenox 40mg daily   9. GYN malignancy  -Stopped anastrazole and initiated 3 weeks of megace 80mg BID followed by 3 weeks of tamoxifen.   - Started megace and metformin 4/10, now on Tamoxifen for 21 days (5/1/19-  not taking pills at this time but on crushed version of tamoxifen.  -  Avoid immunotherapy at this time given associated risk of autoimmune disease   10. Derm: monitor sacrum for s/s of pressure ulcer, now stage 2  11. PT/OT needs evaluation, OOB with nursing staff  12. Social work/palliative: palliative care following. dispo plan to acute rehab when stable.

## 2019-05-12 NOTE — PROGRESS NOTE ADULT - SUBJECTIVE AND OBJECTIVE BOX
GYN PROGRESS NOTE    Patient evaluated at the bedside. No acute events. Patient sitting up in chair with full support. Patient reports that she feels somewhat better. She is tired but has no other complaints. Denies pain.   Denies CP/SOB/dizziness/nausea/vomiting/abdominal pain/calf pain.      O:   T(C): 36.7 (05-12-19 @ 09:00), Max: 37.6 (05-12-19 @ 06:02)  HR: 98 (05-12-19 @ 11:00) (81 - 116)  BP: 112/62 (05-12-19 @ 11:00) (92/68 - 114/68)  RR: 32 (05-12-19 @ 11:00) (11 - 33)  SpO2: 99% (05-12-19 @ 11:00) (84% - 100%)  Wt(kg): --    GEN: Patient sitting up in chair with full support, no acute distress, answers questions appropriately, mentating well   HEENT: no erythema of eyes; no obvious crusting or lesions   LUNGS: no respiratory distress, Nasal canula in place   ABD: soft, nontender, ostomy pink and warm, well profused, gas and stool in bag   Pelvic: joseph draining clear urine   EXT: no calf tenderness, SCDs in place, minimal to no movement of lower extremity         05-11 @ 07:01  -  05-12 @ 07:00  --------------------------------------------------------  IN: 182 mL / OUT: 1355 mL / NET: -1173 mL    05-12 @ 07:01  -  05-12 @ 12:44  --------------------------------------------------------  IN: 176 mL / OUT: 300 mL / NET: -124 mL

## 2019-05-12 NOTE — PROGRESS NOTE ADULT - ASSESSMENT
58yo F PMHx polio, breast cancer, DM, Endometrial Cancer s/p exploratory laparotomy, enterolysis, SHAMIR, BSO, pelvic lymphadenectomy, low anterior resection mobilization of splenic flexure, end colostomy on 7/30/18, rectovaginal fistula, numerous admissions for urinary tract infection presented to [facility] in the setting of urosepsis. Hospital course complicated by blood stream infection (currently on ertapenem) and respiratory failure requiring intubation s/p extubation on 5/2. Intubated urgently on 7 lachman for CO2 narcosis in setting of AIDP and stepped up to MICU, now improving s/p IVIG and extubated to bipap on 5/10.    CARDIOVASCULAR  #hemodynamics  Hd stable  - making appropriate amounts of urine, mentating well, warm extremities    PULMONARY  #Acute hypercapneic respiratory failure 2/2 to demyelinating polyneuropathy (AIDP)  improved, extubated to bipap on 5/10  - s/p 4 doses of IVIG  - per neurology, will need another round of 4 doses in two weeks  - increased upper airway secretions on 5/11 - will trial glycopyrrolate IV  - pulmonary percussion tx       #hx of urinary retention  - retaining on 5/8, so placed joseph  - might need SP catheter  - appreciate urology recs    Neurology  #AIDP  - improving  - patient w AIDP leading to respiratory compromise  - previous EMG w demyelinating polyneuropathy  - recs per neurology  - s/p 4 rounds of IVIG, with dramatic improvement in strength and respiratory status  - f/u mr brain w/wo contrast and MR cervical spine w/wo contrast when patient stable to go for imaging      INFECTIOUS DISEASE  #hx of recurrent ESBL ecoli UTI and bacteremia  - previously on admission patient admitted to MICU in septic shock 2/2 to ESBL ecoli bactermia and UTI  - continue ertepenem daily per ID recs through 5/14    HEME/ONC  #Stage IV endometrial adenocarcinoma  per GYN-ONC w/ interval increase in tumor burden. Pt was treated with Anastrazole and initiated 3 weeks of megace 80mg BID followed by 3 weeks of tamoxifen. started megace and metformin 4/10, on Tamoxifen for 21 days (5/1/19-), now w crushed version    FLUIDS/ELECTROLYTES/NUTRITION  -IVF: none  -Monitor, Replete to K>4 and Mg>2  -Diet: tube feeds and PO diet as tolerated    PROPHYLAXIS  -DVT: lovenox and SCDs  -GI: protonix    DISPO: MICU  CODE STATUS: full code; palliative following regarding GOC 60 yo F PMHx polio, breast cancer, DM, Endometrial Cancer s/p exploratory laparotomy, enterolysis, SHAMIR, BSO, pelvic lymphadenectomy, low anterior resection mobilization of splenic flexure, end colostomy on 7/30/18, rectovaginal fistula, numerous admissions for urinary tract infection presented to [facility] in the setting of urosepsis. Hospital course complicated by blood stream infection (currently on ertapenem) and respiratory failure requiring intubation s/p extubation on 5/2. Intubated urgently on 7 lachman for CO2 narcosis in setting of AIDP and stepped up to MICU, now improving s/p IVIG and extubated to bipap on 5/10, now patient saturating well on NC    CARDIOVASCULAR  #Hemodynamics  Stable  - making appropriate amounts of urine, mentating well, warm extremities    PULMONARY  #Acute hypercapnic respiratory failure   Improving. 2/2 to demyelinating polyneuropathy (AIDP), patient extubated to bipap on 5/10, now on 4L NC, SpO2 %  - s/p 4 doses of IVIG  - per neurology, will need another round of 4 doses in two weeks  - increased upper airway secretions on 5/11 - s/p glycopyrrolate IV x 1  - glycopyrrolate 0.4mg IV PRN  - pulmonary percussion tx   - OOB to chair      #Hx of urinary retention  - retaining on 5/8, so placed joseph  - might need SP catheter  - appreciate urology recs    Neurology  #AIDP  Improving. Patient w AIDP leading to respiratory compromise, previous EMG w demyelinating polyneuropathy  - recs per neurology  - s/p 4 rounds of IVIG, with dramatic improvement in strength and respiratory status  - f/u MRI brain w/wo contrast and MR cervical spine w/wo contrast when patient stable to go for imaging    INFECTIOUS DISEASE  #hx of recurrent ESBL ecoli UTI and bacteremia  - previously on admission patient admitted to MICU in septic shock 2/2 to ESBL ecoli bactermia and UTI  - continue ertepenem daily per ID recs through 5/14    HEME/ONC  #Stage IV endometrial adenocarcinoma  Per GYN-ONC w/ interval increase in tumor burden. Pt was treated with Anastrazole and initiated 3 weeks of megace 80mg BID followed by 3 weeks of tamoxifen. started megace and metformin 4/10, now off megace and metformin  - c/w Tamoxifen for 21 days (5/1/19- ), now w crushed version  - GYN-ONC on board, follow recs    FLUIDS/ELECTROLYTES/NUTRITION  -IVF: none  -Monitor, Replete to K>4 and Mg>2  -Diet: PO diet as tolerated, patient currently with decreased appetite, will continue NGT feeds for now    PROPHYLAXIS  -DVT: Lovenox and SCDs  -GI: protonix 40mg PO qd    DISPO: MICU    CODE STATUS: full code; palliative following regarding GOC 60 yo F PMHx polio, breast cancer, DM, Endometrial Cancer s/p exploratory laparotomy, enterolysis, SHAMIR, BSO, pelvic lymphadenectomy, low anterior resection mobilization of splenic flexure, end colostomy on 7/30/18, rectovaginal fistula, numerous admissions for urinary tract infection presented to [facility] in the setting of urosepsis. Hospital course complicated by blood stream infection (currently on ertapenem) and respiratory failure requiring intubation s/p extubation on 5/2. Intubated urgently on 7 lachman for CO2 narcosis in setting of AIDP and stepped up to MICU, now improving s/p IVIG and extubated to bipap on 5/10, now patient saturating well on NC    CARDIOVASCULAR  #Hemodynamics  Stable  - making appropriate amounts of urine, mentating well, warm extremities    PULMONARY  #Acute hypercapnic respiratory failure   Improving. 2/2 to demyelinating polyneuropathy (AIDP), patient extubated to bipap on 5/10, now on 4L NC, SpO2 %  - s/p 4 doses of IVIG  - per neurology, will need another round of 4 doses in two weeks  - increased upper airway secretions on 5/11 - s/p glycopyrrolate IV x 1  - glycopyrrolate 0.4mg IV PRN trouble handling secretions  - pulmonary percussion tx   - OOB to chair      #Hx of urinary retention  - retaining on 5/8, so placed joseph  - might need SP catheter  - appreciate urology recs    Neurology  #AIDP  Improving. Patient w AIDP leading to respiratory compromise, previous EMG w demyelinating polyneuropathy  - recs per neurology  - s/p 4 rounds of IVIG, with dramatic improvement in strength and respiratory status  - f/u MRI brain w/wo contrast and MR cervical spine w/wo contrast when patient stable to go for imaging    INFECTIOUS DISEASE  #hx of recurrent ESBL ecoli UTI and bacteremia  - previously on admission patient admitted to MICU in septic shock 2/2 to ESBL ecoli bactermia and UTI  - continue ertepenem daily per ID recs through 5/14    HEME/ONC  #Stage IV endometrial adenocarcinoma  Per GYN-ONC w/ interval increase in tumor burden. Pt was treated with Anastrazole and initiated 3 weeks of megace 80mg BID followed by 3 weeks of tamoxifen. started megace and metformin 4/10, now off megace and metformin  - c/w Tamoxifen for 21 days (5/1/19- ), now w crushed version  - GYN-ONC on board, follow recs    FLUIDS/ELECTROLYTES/NUTRITION  -IVF: none  -Monitor, Replete to K>4 and Mg>2  -Diet: PO diet as tolerated, patient currently with decreased appetite, will continue NGT feeds for now    PROPHYLAXIS  -DVT: Lovenox and SCDs  -GI: protonix 40mg PO qd    DISPO: MICU    CODE STATUS: full code; palliative following regarding GOC

## 2019-05-12 NOTE — PROGRESS NOTE ADULT - SUBJECTIVE AND OBJECTIVE BOX
Interval HPI/overnight events:    VITAL SIGNS:  Vital Signs Last 24 Hrs  T(C): 36.1 (12 May 2019 01:03), Max: 37.4 (11 May 2019 13:00)  T(F): 97 (12 May 2019 01:03), Max: 99.4 (11 May 2019 13:00)  HR: 86 (12 May 2019 06:00) (81 - 116)  BP: 110/69 (12 May 2019 05:00) (92/68 - 121/74)  BP(mean): 91 (12 May 2019 05:00) (68 - 102)  RR: 17 (12 May 2019 06:00) (11 - 33)  SpO2: 100% (12 May 2019 06:00) (84% - 100%)       05-10-19 @ 07:01  -  05-11-19 @ 07:00  --------------------------------------------------------  IN: 1152 mL / OUT: 1050 mL / NET: 102 mL    05-11-19 @ 07:01  -  05-12-19 @ 06:06  --------------------------------------------------------  IN: 182 mL / OUT: 1180 mL / NET: -998 mL              PHYSICAL EXAM:  General: in NAD, lying comfortably in bed  HEENT: normocephalic, atraumatic; PERRL, anicteric sclera; MMM  Neck: supple, no JVD, no thyromegaly, no lymphadenopathy  Cardiovascular: +S1/S2, RRR, no M/G/R  Respiratory: clear to auscultation B/L; no wheezing, no rales, no rhonchi  Gastrointestinal: soft, NT/ND; +BSx4, no organomegaly  Extremities: WWP; no edema, clubbing or cyanosis  Vascular: 2+ radial, DP/PT pulses B/L  Neurological: AAOx          ; no focal deficits    MEDICATIONS:  MEDICATIONS  (STANDING):  chlorhexidine 2% Cloths 1 Application(s) Topical <User Schedule>  dextrose 5%. 1000 milliLiter(s) (50 mL/Hr) IV Continuous <Continuous>  dextrose 50% Injectable 25 Gram(s) IV Push once  enoxaparin Injectable 40 milliGRAM(s) SubCutaneous every 24 hours  ertapenem  IVPB 1000 milliGRAM(s) IV Intermittent every 24 hours  insulin lispro (HumaLOG) corrective regimen sliding scale   SubCutaneous every 6 hours  nystatin Powder 1 Application(s) Topical three times a day  pantoprazole    Tablet 40 milliGRAM(s) Oral daily  petrolatum Ophthalmic Ointment 1 Application(s) Both EYES three times a day  tamoxifen 20 milliGRAM(s) Oral two times a day  zinc oxide 20% Ointment 1 Application(s) Topical three times a day    MEDICATIONS  (PRN):  acetaminophen   Tablet .. 650 milliGRAM(s) Oral every 6 hours PRN Temp greater or equal to 38C (100.4F), Mild Pain (1 - 3)  glucagon  Injectable 1 milliGRAM(s) IntraMuscular once PRN Glucose LESS THAN 70 milligrams/deciliter      ALLERGIES:  Allergies    No Known Allergies    Intolerances    IVIG PRODUCT IS PRIGIVEN OR GAMMUNEX (Unknown)      LABS:                        7.2    7.96  )-----------( 207      ( 10 May 2019 06:16 )             23.0     05-10    136  |  101  |  22  ----------------------------<  186<H>  3.7   |  25  |  0.48<L>    Ca    8.8      10 May 2019 06:16  Mg     2.1     05-10          CAPILLARY BLOOD GLUCOSE      POCT Blood Glucose.: 83 mg/dL (12 May 2019 05:39)      RADIOLOGY & ADDITIONAL TESTS: Reviewed. Interval HPI/overnight events: Overnight, patient refused BiPAP, saturating well on 4L NC. Patient seen and examined at bedside, sitting up in bed, no acute complaints. ROS negative    VITAL SIGNS:  Vital Signs Last 24 Hrs  T(C): 36.1 (12 May 2019 01:03), Max: 37.4 (11 May 2019 13:00)  T(F): 97 (12 May 2019 01:03), Max: 99.4 (11 May 2019 13:00)  HR: 86 (12 May 2019 06:00) (81 - 116)  BP: 110/69 (12 May 2019 05:00) (92/68 - 121/74)  BP(mean): 91 (12 May 2019 05:00) (68 - 102)  RR: 17 (12 May 2019 06:00) (11 - 33)  SpO2: 100% (12 May 2019 06:00) (84% - 100%)       05-10-19 @ 07:01  -  05-11-19 @ 07:00  --------------------------------------------------------  IN: 1152 mL / OUT: 1050 mL / NET: 102 mL    05-11-19 @ 07:01  -  05-12-19 @ 06:06  --------------------------------------------------------  IN: 182 mL / OUT: 1180 mL / NET: -998 mL    PHYSICAL EXAM:  General: in NAD, lying comfortably in bed, NC in place  HEENT: normocephalic, atraumatic; anicteric sclera; NGT in place  Neck: supple, no JVD  Cardiovascular: +S1/S2, RRR, no M/G/R  Respiratory: clear to auscultation B/L; no wheezing, no rales, no rhonchi  Gastrointestinal: soft, NT/ND; +BSx4, no organomegaly; +colostomy bag w/ pink stoma; +healed midline scar  Genitourinary: joseph in place, draining yellow urine  Extremities: WWP; no edema, clubbing or cyanosis; bruising on b/l wrists  Vascular: 1+ radial, DP/PT pulses B/L  Neurological: AAOx 2 (person and place), strength 4/5 on b/l UEs and LEs; no focal deficits    MEDICATIONS:  MEDICATIONS  (STANDING):  chlorhexidine 2% Cloths 1 Application(s) Topical <User Schedule>  dextrose 5%. 1000 milliLiter(s) (50 mL/Hr) IV Continuous <Continuous>  dextrose 50% Injectable 25 Gram(s) IV Push once  enoxaparin Injectable 40 milliGRAM(s) SubCutaneous every 24 hours  ertapenem  IVPB 1000 milliGRAM(s) IV Intermittent every 24 hours  insulin lispro (HumaLOG) corrective regimen sliding scale   SubCutaneous every 6 hours  nystatin Powder 1 Application(s) Topical three times a day  pantoprazole    Tablet 40 milliGRAM(s) Oral daily  petrolatum Ophthalmic Ointment 1 Application(s) Both EYES three times a day  tamoxifen 20 milliGRAM(s) Oral two times a day  zinc oxide 20% Ointment 1 Application(s) Topical three times a day    MEDICATIONS  (PRN):  acetaminophen   Tablet .. 650 milliGRAM(s) Oral every 6 hours PRN Temp greater or equal to 38C (100.4F), Mild Pain (1 - 3)  glucagon  Injectable 1 milliGRAM(s) IntraMuscular once PRN Glucose LESS THAN 70 milligrams/deciliter      ALLERGIES:  Allergies    No Known Allergies    Intolerances    IVIG PRODUCT IS PRIGIVEN OR GAMMUNEX (Unknown)      LABS:                        7.2    7.96  )-----------( 207      ( 10 May 2019 06:16 )             23.0     05-10    136  |  101  |  22  ----------------------------<  186<H>  3.7   |  25  |  0.48<L>    Ca    8.8      10 May 2019 06:16  Mg     2.1     05-10      CAPILLARY BLOOD GLUCOSE      POCT Blood Glucose.: 83 mg/dL (12 May 2019 05:39)      RADIOLOGY & ADDITIONAL TESTS: Reviewed.

## 2019-05-13 LAB
ALBUMIN SERPL ELPH-MCNC: 3.1 G/DL — LOW (ref 3.3–5)
ALP SERPL-CCNC: 76 U/L — SIGNIFICANT CHANGE UP (ref 40–120)
ALT FLD-CCNC: 11 U/L — SIGNIFICANT CHANGE UP (ref 10–45)
ANION GAP SERPL CALC-SCNC: 7 MMOL/L — SIGNIFICANT CHANGE UP (ref 5–17)
APPEARANCE UR: ABNORMAL
APTT BLD: 30.4 SEC — SIGNIFICANT CHANGE UP (ref 27.5–36.3)
AST SERPL-CCNC: 14 U/L — SIGNIFICANT CHANGE UP (ref 10–40)
BASE EXCESS BLDA CALC-SCNC: 10.8 MMOL/L — HIGH (ref -2–3)
BASE EXCESS BLDA CALC-SCNC: 12.7 MMOL/L — HIGH (ref -2–3)
BILIRUB SERPL-MCNC: 0.4 MG/DL — SIGNIFICANT CHANGE UP (ref 0.2–1.2)
BILIRUB UR-MCNC: NEGATIVE — SIGNIFICANT CHANGE UP
BUN SERPL-MCNC: 21 MG/DL — SIGNIFICANT CHANGE UP (ref 7–23)
CALCIUM SERPL-MCNC: 10 MG/DL — SIGNIFICANT CHANGE UP (ref 8.4–10.5)
CHLORIDE SERPL-SCNC: 102 MMOL/L — SIGNIFICANT CHANGE UP (ref 96–108)
CO2 SERPL-SCNC: 39 MMOL/L — HIGH (ref 22–31)
COLOR SPEC: YELLOW — SIGNIFICANT CHANGE UP
CREAT SERPL-MCNC: 0.4 MG/DL — LOW (ref 0.5–1.3)
DIFF PNL FLD: ABNORMAL
GLUCOSE BLDC GLUCOMTR-MCNC: 171 MG/DL — HIGH (ref 70–99)
GLUCOSE BLDC GLUCOMTR-MCNC: 192 MG/DL — HIGH (ref 70–99)
GLUCOSE BLDC GLUCOMTR-MCNC: 225 MG/DL — HIGH (ref 70–99)
GLUCOSE BLDC GLUCOMTR-MCNC: 272 MG/DL — HIGH (ref 70–99)
GLUCOSE SERPL-MCNC: 190 MG/DL — HIGH (ref 70–99)
GLUCOSE UR QL: NEGATIVE — SIGNIFICANT CHANGE UP
HCO3 BLDA-SCNC: 39 MMOL/L — HIGH (ref 21–28)
HCO3 BLDA-SCNC: 42 MMOL/L — HIGH (ref 21–28)
HCT VFR BLD CALC: 27.5 % — LOW (ref 34.5–45)
HGB BLD-MCNC: 8.3 G/DL — LOW (ref 11.5–15.5)
INR BLD: 1.08 — SIGNIFICANT CHANGE UP (ref 0.88–1.16)
KETONES UR-MCNC: NEGATIVE — SIGNIFICANT CHANGE UP
LACTATE SERPL-SCNC: 2 MMOL/L — SIGNIFICANT CHANGE UP (ref 0.5–2)
LEUKOCYTE ESTERASE UR-ACNC: ABNORMAL
MAGNESIUM SERPL-MCNC: 1.7 MG/DL — SIGNIFICANT CHANGE UP (ref 1.6–2.6)
MCHC RBC-ENTMCNC: 29.3 PG — SIGNIFICANT CHANGE UP (ref 27–34)
MCHC RBC-ENTMCNC: 30.2 GM/DL — LOW (ref 32–36)
MCV RBC AUTO: 97.2 FL — SIGNIFICANT CHANGE UP (ref 80–100)
NITRITE UR-MCNC: NEGATIVE — SIGNIFICANT CHANGE UP
NRBC # BLD: 0 /100 WBCS — SIGNIFICANT CHANGE UP (ref 0–0)
PCO2 BLDA: 58 MMHG — HIGH (ref 32–45)
PCO2 BLDA: 96 MMHG — CRITICAL HIGH (ref 32–45)
PH BLDA: 7.26 — LOW (ref 7.35–7.45)
PH BLDA: 7.45 — SIGNIFICANT CHANGE UP (ref 7.35–7.45)
PH UR: 6.5 — SIGNIFICANT CHANGE UP (ref 5–8)
PHOSPHATE SERPL-MCNC: 2.8 MG/DL — SIGNIFICANT CHANGE UP (ref 2.5–4.5)
PLATELET # BLD AUTO: 304 K/UL — SIGNIFICANT CHANGE UP (ref 150–400)
PO2 BLDA: 122 MMHG — HIGH (ref 83–108)
PO2 BLDA: 53 MMHG — CRITICAL LOW (ref 83–108)
POTASSIUM SERPL-MCNC: 4.2 MMOL/L — SIGNIFICANT CHANGE UP (ref 3.5–5.3)
POTASSIUM SERPL-SCNC: 4.2 MMOL/L — SIGNIFICANT CHANGE UP (ref 3.5–5.3)
PROT SERPL-MCNC: 9.6 G/DL — HIGH (ref 6–8.3)
PROT UR-MCNC: 100 MG/DL
PROTHROM AB SERPL-ACNC: 12.2 SEC — SIGNIFICANT CHANGE UP (ref 10–12.9)
RBC # BLD: 2.83 M/UL — LOW (ref 3.8–5.2)
RBC # FLD: 16 % — HIGH (ref 10.3–14.5)
SAO2 % BLDA: 88 % — LOW (ref 95–100)
SAO2 % BLDA: 98 % — SIGNIFICANT CHANGE UP (ref 95–100)
SODIUM SERPL-SCNC: 148 MMOL/L — HIGH (ref 135–145)
SP GR SPEC: 1.02 — SIGNIFICANT CHANGE UP (ref 1–1.03)
UROBILINOGEN FLD QL: 0.2 E.U./DL — SIGNIFICANT CHANGE UP
WBC # BLD: 6.72 K/UL — SIGNIFICANT CHANGE UP (ref 3.8–10.5)
WBC # FLD AUTO: 6.72 K/UL — SIGNIFICANT CHANGE UP (ref 3.8–10.5)

## 2019-05-13 PROCEDURE — 99232 SBSQ HOSP IP/OBS MODERATE 35: CPT

## 2019-05-13 PROCEDURE — 99291 CRITICAL CARE FIRST HOUR: CPT

## 2019-05-13 PROCEDURE — 99497 ADVNCD CARE PLAN 30 MIN: CPT | Mod: 25

## 2019-05-13 PROCEDURE — 36514 APHERESIS PLASMA: CPT

## 2019-05-13 PROCEDURE — 71045 X-RAY EXAM CHEST 1 VIEW: CPT | Mod: 26,77

## 2019-05-13 PROCEDURE — 71045 X-RAY EXAM CHEST 1 VIEW: CPT | Mod: 26

## 2019-05-13 PROCEDURE — 99233 SBSQ HOSP IP/OBS HIGH 50: CPT | Mod: GC

## 2019-05-13 PROCEDURE — 99222 1ST HOSP IP/OBS MODERATE 55: CPT | Mod: 25

## 2019-05-13 RX ORDER — PROPOFOL 10 MG/ML
0.5 INJECTION, EMULSION INTRAVENOUS
Qty: 500 | Refills: 0 | Status: DISCONTINUED | OUTPATIENT
Start: 2019-05-13 | End: 2019-05-14

## 2019-05-13 RX ORDER — MIDAZOLAM HYDROCHLORIDE 1 MG/ML
2 INJECTION, SOLUTION INTRAMUSCULAR; INTRAVENOUS
Refills: 0 | Status: DISCONTINUED | OUTPATIENT
Start: 2019-05-13 | End: 2019-05-13

## 2019-05-13 RX ORDER — ALBUMIN HUMAN 25 %
250 VIAL (ML) INTRAVENOUS ONCE
Refills: 0 | Status: COMPLETED | OUTPATIENT
Start: 2019-05-13 | End: 2019-05-13

## 2019-05-13 RX ORDER — SODIUM CHLORIDE 9 MG/ML
1000 INJECTION INTRAMUSCULAR; INTRAVENOUS; SUBCUTANEOUS ONCE
Refills: 0 | Status: COMPLETED | OUTPATIENT
Start: 2019-05-13 | End: 2019-05-13

## 2019-05-13 RX ORDER — LIDOCAINE HCL 20 MG/ML
5 VIAL (ML) INJECTION ONCE
Refills: 0 | Status: COMPLETED | OUTPATIENT
Start: 2019-05-13 | End: 2019-05-13

## 2019-05-13 RX ORDER — PANTOPRAZOLE SODIUM 20 MG/1
40 TABLET, DELAYED RELEASE ORAL DAILY
Refills: 0 | Status: DISCONTINUED | OUTPATIENT
Start: 2019-05-13 | End: 2019-05-24

## 2019-05-13 RX ORDER — ACETAMINOPHEN 500 MG
650 TABLET ORAL ONCE
Refills: 0 | Status: COMPLETED | OUTPATIENT
Start: 2019-05-13 | End: 2019-05-13

## 2019-05-13 RX ORDER — NOREPINEPHRINE BITARTRATE/D5W 8 MG/250ML
0.05 PLASTIC BAG, INJECTION (ML) INTRAVENOUS
Qty: 8 | Refills: 0 | Status: DISCONTINUED | OUTPATIENT
Start: 2019-05-13 | End: 2019-05-16

## 2019-05-13 RX ORDER — MAGNESIUM SULFATE 500 MG/ML
2 VIAL (ML) INJECTION ONCE
Refills: 0 | Status: COMPLETED | OUTPATIENT
Start: 2019-05-13 | End: 2019-05-13

## 2019-05-13 RX ORDER — ALBUMIN HUMAN 25 %
2500 VIAL (ML) INTRAVENOUS ONCE
Refills: 0 | Status: COMPLETED | OUTPATIENT
Start: 2019-05-13 | End: 2019-05-13

## 2019-05-13 RX ORDER — SCOPALAMINE 1 MG/3D
1 PATCH, EXTENDED RELEASE TRANSDERMAL ONCE
Refills: 0 | Status: COMPLETED | OUTPATIENT
Start: 2019-05-13 | End: 2019-05-13

## 2019-05-13 RX ORDER — CALCIUM GLUCONATE 100 MG/ML
1 VIAL (ML) INTRAVENOUS ONCE
Refills: 0 | Status: COMPLETED | OUTPATIENT
Start: 2019-05-13 | End: 2019-05-13

## 2019-05-13 RX ADMIN — MIDAZOLAM HYDROCHLORIDE 2 MILLIGRAM(S): 1 INJECTION, SOLUTION INTRAMUSCULAR; INTRAVENOUS at 14:30

## 2019-05-13 RX ADMIN — PANTOPRAZOLE SODIUM 40 MILLIGRAM(S): 20 TABLET, DELAYED RELEASE ORAL at 12:53

## 2019-05-13 RX ADMIN — ENOXAPARIN SODIUM 40 MILLIGRAM(S): 100 INJECTION SUBCUTANEOUS at 23:26

## 2019-05-13 RX ADMIN — Medication 200 GRAM(S): at 20:48

## 2019-05-13 RX ADMIN — SCOPALAMINE 1 PATCH: 1 PATCH, EXTENDED RELEASE TRANSDERMAL at 10:14

## 2019-05-13 RX ADMIN — Medication 1 APPLICATION(S): at 05:55

## 2019-05-13 RX ADMIN — Medication 2: at 18:00

## 2019-05-13 RX ADMIN — NYSTATIN CREAM 1 APPLICATION(S): 100000 CREAM TOPICAL at 22:32

## 2019-05-13 RX ADMIN — CHLORHEXIDINE GLUCONATE 1 APPLICATION(S): 213 SOLUTION TOPICAL at 05:55

## 2019-05-13 RX ADMIN — ERTAPENEM SODIUM 120 MILLIGRAM(S): 1 INJECTION, POWDER, LYOPHILIZED, FOR SOLUTION INTRAMUSCULAR; INTRAVENOUS at 21:13

## 2019-05-13 RX ADMIN — MIDAZOLAM HYDROCHLORIDE 2 MILLIGRAM(S): 1 INJECTION, SOLUTION INTRAMUSCULAR; INTRAVENOUS at 14:32

## 2019-05-13 RX ADMIN — Medication 1 APPLICATION(S): at 22:34

## 2019-05-13 RX ADMIN — Medication 50 GRAM(S): at 18:28

## 2019-05-13 RX ADMIN — Medication 125 MILLILITER(S): at 23:32

## 2019-05-13 RX ADMIN — TAMOXIFEN CITRATE 20 MILLIGRAM(S): 20 TABLET, FILM COATED ORAL at 05:56

## 2019-05-13 RX ADMIN — ZINC OXIDE 1 APPLICATION(S): 200 OINTMENT TOPICAL at 22:32

## 2019-05-13 RX ADMIN — Medication 1250 MILLILITER(S): at 20:49

## 2019-05-13 RX ADMIN — Medication 5 MILLILITER(S): at 13:45

## 2019-05-13 RX ADMIN — Medication 650 MILLIGRAM(S): at 19:12

## 2019-05-13 RX ADMIN — Medication 6: at 23:26

## 2019-05-13 RX ADMIN — ZINC OXIDE 1 APPLICATION(S): 200 OINTMENT TOPICAL at 05:54

## 2019-05-13 RX ADMIN — Medication 4: at 05:54

## 2019-05-13 RX ADMIN — SODIUM CHLORIDE 4000 MILLILITER(S): 9 INJECTION INTRAMUSCULAR; INTRAVENOUS; SUBCUTANEOUS at 14:35

## 2019-05-13 RX ADMIN — SCOPALAMINE 1 PATCH: 1 PATCH, EXTENDED RELEASE TRANSDERMAL at 19:06

## 2019-05-13 RX ADMIN — NYSTATIN CREAM 1 APPLICATION(S): 100000 CREAM TOPICAL at 05:54

## 2019-05-13 RX ADMIN — Medication 2: at 11:44

## 2019-05-13 NOTE — PROGRESS NOTE ADULT - ASSESSMENT
60 yo F PMHx polio, breast cancer, DM, Endometrial Cancer s/p exploratory laparotomy, enterolysis, SHAMIR, BSO, pelvic lymphadenectomy, low anterior resection mobilization of splenic flexure, end colostomy on 7/30/18, rectovaginal fistula, numerous admissions for urinary tract infection presented to [facility] in the setting of urosepsis. Hospital course complicated by blood stream infection (currently on ertapenem) and respiratory failure requiring intubation s/p extubation on 5/2. Intubated urgently on 7 lachman for CO2 narcosis in setting of AIDP and stepped up to MICU, now improving s/p IVIG and extubated to bipap on 5/10, now patient saturating well on NC    CARDIOVASCULAR  #Hemodynamics  Stable  - making appropriate amounts of urine, mentating well, warm extremities    PULMONARY  #Acute hypercapnic respiratory failure   Improving. 2/2 to demyelinating polyneuropathy (AIDP), patient extubated to bipap on 5/10, now on 4L NC, SpO2 %  - s/p 4 doses of IVIG  - per neurology, will need another round of 4 doses in two weeks  - increased upper airway secretions on 5/11 - s/p glycopyrrolate IV x 1  - glycopyrrolate 0.4mg IV PRN trouble handling secretions  - pulmonary percussion tx   - OOB to chair      #Hx of urinary retention  - retaining on 5/8, so placed joseph  - might need SP catheter  - appreciate urology recs    Neurology  #AIDP  Improving. Patient w AIDP leading to respiratory compromise, previous EMG w demyelinating polyneuropathy  - recs per neurology  - s/p 4 rounds of IVIG, with dramatic improvement in strength and respiratory status  - f/u MRI brain w/wo contrast and MR cervical spine w/wo contrast when patient stable to go for imaging    INFECTIOUS DISEASE  #hx of recurrent ESBL ecoli UTI and bacteremia  - previously on admission patient admitted to MICU in septic shock 2/2 to ESBL ecoli bactermia and UTI  - continue ertepenem daily per ID recs through 5/14    HEME/ONC  #Stage IV endometrial adenocarcinoma  Per GYN-ONC w/ interval increase in tumor burden. Pt was treated with Anastrazole and initiated 3 weeks of megace 80mg BID followed by 3 weeks of tamoxifen. started megace and metformin 4/10, now off megace and metformin  - c/w Tamoxifen for 21 days (5/1/19- ), now w crushed version  - GYN-ONC on board, follow recs    FLUIDS/ELECTROLYTES/NUTRITION  -IVF: none  -Monitor, Replete to K>4 and Mg>2  -Diet: PO diet as tolerated, patient currently with decreased appetite, will continue NGT feeds for now    PROPHYLAXIS  -DVT: Lovenox and SCDs  -GI: protonix 40mg PO qd    DISPO: MICU    CODE STATUS: full code; palliative following regarding GOC 60 yo F PMHx polio, breast cancer, DM, Endometrial Cancer s/p exploratory laparotomy, enterolysis, SHAMIR, BSO, pelvic lymphadenectomy, low anterior resection mobilization of splenic flexure, end colostomy on 7/30/18, rectovaginal fistula, numerous admissions for urinary tract infection presented to [facility] in the setting of urosepsis. Hospital course complicated by blood stream infection (currently on ertapenem) and respiratory failure requiring intubation s/p extubation on 5/2. Intubated urgently on 7 lachman for CO2 narcosis in setting of AIDP and stepped up to MICU, now improving s/p IVIG and extubated to bipap on 5/10, now patient saturating well on NC    CARDIOVASCULAR  #Hemodynamics  Stable  - making appropriate amounts of urine, mentating well, warm extremities    PULMONARY  #Acute hypercapnic respiratory failure   Improving. 2/2 to demyelinating polyneuropathy (AIDP), patient extubated to bipap on 5/10, now on 4L NC, SpO2 %  - s/p 4 doses of IVIG  - per neurology, will need another round of 4 doses in two weeks  - increased upper airway secretions on 5/11 - s/p glycopyrrolate IV x 1  - glycopyrrolate 0.4mg IV PRN trouble handling secretions  - consider starting scopolamine patch  - pulmonary percussion tx   - OOB to chair      #Hx of urinary retention  - retaining on 5/8, so placed joseph  - might need SP catheter  - appreciate urology recs    Neurology  #AIDP  Improving. Patient w AIDP leading to respiratory compromise, previous EMG w demyelinating polyneuropathy  - recs per neurology  - s/p 4 rounds of IVIG, with dramatic improvement in strength and respiratory status  - f/u MRI brain w/wo contrast and MR cervical spine w/wo contrast when patient stable to go for imaging    INFECTIOUS DISEASE  #hx of recurrent ESBL ecoli UTI and bacteremia  - previously on admission patient admitted to MICU in septic shock 2/2 to ESBL ecoli bactermia and UTI  - continue ertepenem daily per ID recs through 5/14    HEME/ONC  #Stage IV endometrial adenocarcinoma  Per GYN-ONC w/ interval increase in tumor burden. Pt was treated with Anastrazole and initiated 3 weeks of megace 80mg BID followed by 3 weeks of tamoxifen. started megace and metformin 4/10, now off megace and metformin  - c/w Tamoxifen for 21 days (5/1/19- ), now w crushed version  - GYN-ONC on board, follow recs    FLUIDS/ELECTROLYTES/NUTRITION  -IVF: none  -Monitor, Replete to K>4 and Mg>2  -Diet: PO diet as tolerated, patient currently with decreased appetite, will continue NGT feeds for now, will get speech and swallow eval for recs    PROPHYLAXIS  -DVT: Lovenox and SCDs  -GI: protonix 40mg PO qd    DISPO: MICU    CODE STATUS: full code; palliative following regarding GOC 60 yo F PMHx polio, breast cancer, DM, Endometrial Cancer s/p exploratory laparotomy, enterolysis, SHAMIR, BSO, pelvic lymphadenectomy, low anterior resection mobilization of splenic flexure, end colostomy on 7/30/18, rectovaginal fistula, numerous admissions for urinary tract infection presented to [facility] in the setting of urosepsis. Hospital course complicated by blood stream infection (currently on ertapenem) and respiratory failure requiring intubation s/p extubation on 5/2. Intubated urgently on 7 lachman for CO2 narcosis in setting of AIDP and stepped up to MICU, now improving s/p IVIG and extubated to bipap on 5/10, now patient saturating well on NC    CARDIOVASCULAR  #Hemodynamics  Stable  - making appropriate amounts of urine, mentating well, warm extremities    PULMONARY  #Acute hypercapnic respiratory failure   Improving. 2/2 to demyelinating polyneuropathy (AIDP), patient extubated to bipap on 5/10, now on 4L NC, SpO2 %  - s/p 4 doses of IVIG  - per neurology, will need another round of 4 doses in two weeks  - increased upper airway secretions on 5/11 - s/p glycopyrrolate IV x 1  - glycopyrrolate 0.4mg IV PRN trouble handling secretions  - start scopolamine patch von 5/13  - pulmonary percussion tx   - OOB to chair      #Hx of urinary retention  - retaining on 5/8, so placed joseph  - might need SP catheter  - appreciate urology recs    Neurology  #AIDP  Improving. Patient w AIDP leading to respiratory compromise, previous EMG w demyelinating polyneuropathy  - recs per neurology  - s/p 4 rounds of IVIG, with dramatic improvement in strength and respiratory status  - f/u MRI brain w/wo contrast and MR cervical spine w/wo contrast when patient stable to go for imaging    INFECTIOUS DISEASE  #hx of recurrent ESBL ecoli UTI and bacteremia  - previously on admission patient admitted to MICU in septic shock 2/2 to ESBL ecoli bactermia and UTI  - continue ertepenem daily per ID recs through 5/14    HEME/ONC  #Stage IV endometrial adenocarcinoma  Per GYN-ONC w/ interval increase in tumor burden. Pt was treated with Anastrazole and initiated 3 weeks of megace 80mg BID followed by 3 weeks of tamoxifen. started megace and metformin 4/10, now off megace and metformin  - c/w Tamoxifen for 21 days (5/1/19- ), now w crushed version  - GYN-ONC on board, follow recs    FLUIDS/ELECTROLYTES/NUTRITION  -IVF: none  -Monitor, Replete to K>4 and Mg>2  -Diet: PO diet as tolerated, patient currently with decreased appetite, will continue NGT feeds for now, will get speech and swallow eval for recs    PROPHYLAXIS  -DVT: Lovenox and SCDs  -GI: protonix 40mg PO qd    DISPO: MICU    CODE STATUS: full code; palliative following regarding GOC

## 2019-05-13 NOTE — CHART NOTE - NSCHARTNOTEFT_GEN_A_CORE
In addition to E and M visit charted above an Advance care planning meeting was held  Start time:3 PM  End time: 3:30 PM  Total time: 30 minutes    A face to face meeting to discuss advance care planning was held today regarding: TIM MCDANIEL  Primary decision maker: sister Esha  Alternate/surrogate:  Discussed advance directives including, but not limited to, healthcare proxy and code status.  Decision regarding code status: remains full code and presently intubated  Sister discussed that patient would not want long term trach or long term feeding tube.  She expressed desire for trial of plasmapheresis for neurological weakness.  She is concerned that patient will never return home which was patient's wish. Sister feels things are going very badly and that Tim is unlikely to survive  She is concerned that patient will be told that she is dying.  We began a discussion of hospice, but decision certainly is pending response to plasmapheresis.  Will revisit decision tomorrow.  Patient in the past has expressed wish not to be re-intubated.  Documentation completed today: none

## 2019-05-13 NOTE — PROGRESS NOTE ADULT - PROBLEM SELECTOR PLAN 3
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; had been in subacute rehab facility for LE weakness and with gait/strength training regimen via PT; now further complicated by her initial septic shock and now second event of respiratory failure on this admission  - continue PT  - out of bed to chair when appropriate  - nutritional support  - patient distressed by her increasing dependence, not sure she will be well enough to return home but has been in better spirits for the last two days as problem #2 improves 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

## 2019-05-13 NOTE — PROGRESS NOTE ADULT - ASSESSMENT
58yo F HD14 with stage IV endometrial adenocarcinoma s/p staging surgery '18 and 1 round of chemotherapy 2/19 readmitted from [facility] with fever, previously admitted for management of symptomatic rectovaginal and enterovaginal fistulas. Presents on referral from [facility] with urosepsis.  Rapid response called due to worsening respiratory status s/p intubation and MICU admission. She then was on regional GYN service however had respiratory weakness and was transferred to Medicine/tele HD9 until requiring intubation again HD10 and is now in MICU. 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 Extubated on NC and improving now.  Management per MICU    1. Neuro: weakness secondary Guillain [place] versus exacerbation of AIDP- now status post IVIG x4 day course. Neurology following-appreciate recs. CSF culture no organisms seen, MRI when stable, per neurology will treat with IVIG x2 days every 2wks  2. Pulm: Extubated 5/10 - transitioned to BiPAP initially, now on nasal canula 4L.  Glycopyrrolate given to help decrease secretions which are improved. Pulmonary percussion Tx per medicine team.   3. Cardio: Vitals improved, off pressor support. Appropriate urine output. Tachycardia, pt has SVT and also could be 2/2 glycopyrrolate (last taken 5/11).  4. FEN/GI: Dobhoff in situ, on tube feeds.  - TF were paused sat evening given concern for possible aspiration then restarted  - Patient passed dysphagia testing.  - Recommend daily labs with ongoing electrolytes repletions PRN. labs this AM.  5. : Adequate urine output, joseph in place.  - Joseph replaced 5/8 after finding of overflow incontinence, f/u urology recs regarding suprapubic catheter   - rec UA, ucx today due to turbid urine  6. ID: IV ertapenem until 5/14, s/p meropenem, s/p vanc, appreciate ID recs.   - Patient appears to have Right Conjunctivitis recommend abx drop treatment (not given yet)  7. Endocrine: FS, ISS, Metformin 1000mg BID held at this time   8. VTE prophylaxis - SCDs, Lovenox 40mg daily   9. GYN malignancy  -Stopped anastrazole and initiated 3 weeks of megace 80mg BID followed by 3 weeks of tamoxifen.   - Started megace and metformin 4/10, now on Tamoxifen for 21 days (5/1/19-  now on pills.  -  Avoid immunotherapy at this time given associated risk of autoimmune disease   10. Derm: monitor sacrum for s/s of pressure ulcer, now stage 2  11. PT/OT needs evaluation, OOB with nursing staff  12. Social work/palliative: palliative care following. dispo plan to acute rehab when stable.

## 2019-05-13 NOTE — PROGRESS NOTE ADULT - PROBLEM SELECTOR PLAN 1
IMPROVING - suspected 2/2 problem #2 below; CCM team now going to extubate patient (done as of time of note writing)  - continue CCM management for respiratory failure  - encouragement and support given to patient, will try to return to talk to her later today when off ventilatory support Initially improved and extubated on Friday 5/10; now worse in setting of relapsed weakness from AIDP as described above - at time of note writing pCO2 96 and CCM team preparing for intubation  - continue CCM management for respiratory failure  - primary team contacted pt's sister/Esha who wishes for intubation now, and on way to hospital. Planned for further discussion once she arrives regarding long term management i.e. trach or not.

## 2019-05-13 NOTE — PROGRESS NOTE ADULT - ATTENDING COMMENTS
I was physically present for the key portions of the evaluation and managemnent (E/M) service provided.  I agree with the above history, physical, and plan which I have reviewed and edited where appropriate, with the exceptions as per my note.    Pt seen and examined on 5/13/19, dos 5/13/19.    pt is drowsy but awake, responds appropriately to questions.  attends examiner bl, eom appear intact in all directions.  4-/5 BL UEs proximally, 4/5 in HG. 2/5 in BL HF, 0/5 distal LEs.  dtrs absent, toes mute.    AP: likely relapsing aidp although with nl protein in csf. not consistent with cidp given degree of respiratory and bulbar weakness she has experienced. s/p ivig, but worsened from respiratory perspective.    - start plex given worsening respiratory status.  - will follow.

## 2019-05-13 NOTE — SWALLOW BEDSIDE ASSESSMENT ADULT - PHARYNGEAL PHASE
Wet vocal quality post oral intake/Cough post oral intake/Delayed pharyngeal swallow/Decreased laryngeal elevation

## 2019-05-13 NOTE — PROGRESS NOTE ADULT - ATTENDING COMMENTS
Patient seen s/p intubation. Gyn oncology will continue to follow, defer management of acute neurologic and respiratory conditions to primary team.

## 2019-05-13 NOTE — PROGRESS NOTE ADULT - ASSESSMENT
53yo F w/ known stage IV endometrial cancer, likely with progression of disease, complex fistulae, chronic indwelling [place] admitted with fever and urosepsis originally requiring MICU admission for septic shock and respiratory failure which improved; however course c/b progressive ascending weakness while on medical telemetry/SDU despite initiation of IVIG, which was further c/b acute hypercapnic respiratory failure requiring reintubation and readmission to MICU for further management. Palliative following for symptom management, patient/family support, and goals of care. 55yo F w/ known stage IV endometrial cancer, likely with progression of disease, complex fistulae, chronic indwelling [place] admitted with fever and urosepsis originally requiring MICU admission for septic shock and respiratory failure which improved; however course c/b progressive ascending weakness while on medical telemetry/SDU despite initiation of IVIG, which was further c/b acute hypercapnic respiratory failure requiring reintubation and readmission to MICU for further management. Had shown some improvement, but today again with impending hypercarbic respiratory failure. Palliative following for symptom management, patient/family support, and goals of care.

## 2019-05-13 NOTE — PROGRESS NOTE ADULT - PROBLEM SELECTOR PLAN 7
rectovaginal and enterovaginal fistulas developed secondary to problem #6 and staging surgery in Fall 2018  -  evaluated and discussed option of supra-pubic catheter that may decrease, but not eliminate her risk/propensity for recurrent urinary infections. Management in this respect has been deferred 2/2 her more acute problems as described above  - joseph had been removed earlier in week but was replaced yesterday when re-admitted to MICU rectovaginal and enterovaginal fistulas developed secondary to problem #6 and staging surgery in Fall 2018  -  evaluated and discussed option of supra-pubic catheter that may decrease, but not eliminate her risk/propensity for recurrent urinary infections. Management in this respect has been deferred 2/2 her more acute problems as described above

## 2019-05-13 NOTE — PROGRESS NOTE ADULT - ASSESSMENT
60yo F PMHx polio, breast cancer, DM, Endometrial Cancer s/p exploratory laparotomy, enterolysis, SHAMIR, BSO, pelvic lymphadenectomy, low anterior resection mobilization of splenic flexure, end colostomy on 7/30/18, rectovaginal fistula, numerous admissions for urinary tract infection presented to [facility] in the setting of urosepsis. Hospital course complicated by blood stream infection (currently on ertapenem) and respiratory failure requiring intubation s/p extubation on 5/2. Neurology consulted for worsening lower extremity weakness. On prior admission EMG raises suspicion for CIDP-spectrum disorder. Per collateral information patient became noticeably weak mala in her hands for approx 1 week prior to being diagnosed with IDP in April, suggesting a more acute process. Patient now likely w/ acute inflammatory demyelinating polyneuropathy (AIDP) given w/ respiratory distress and facial muscle weakness requiring intubation now s/p extubation and four days of IVIG, patient's motor strength improving on exam and respiratory status improving. Difficult time clearing secretions and dysphagia.     - s/p four days of IVIG 500mg/kg on 5/7 - 5/10   - f/u on MG antibodies: Acetylcholine Blocking AB, Acetylcholine Modulating AB, Acetylcholine Receptor Binding Antibody, MuSK Antibody Test  - Pending MR brain w and wo con & MR cervical spine w and wo con  - Patient will need IVIG treatment next in 2 weeks on 5/24- will give 0.5g/kg daily for 2 days every 2 weeks for at least 2 months     Neurology will follow 60yo F PMHx polio, breast cancer, DM, Endometrial Cancer s/p exploratory laparotomy, enterolysis, SHAMIR, BSO, pelvic lymphadenectomy, low anterior resection mobilization of splenic flexure, end colostomy on 7/30/18, rectovaginal fistula, numerous admissions for urinary tract infection presented to [facility] in the setting of urosepsis. Hospital course complicated by blood stream infection (currently on ertapenem) and respiratory failure requiring intubation s/p extubation on 5/2. Neurology consulted for worsening lower extremity weakness. On prior admission EMG raises suspicion for CIDP-spectrum disorder. Per collateral information patient became noticeably weak mala in her hands for approx 1 week prior to being diagnosed with IDP in April, suggesting a more acute process. 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 s/p four days of IVIG 500mg/kg on 5/7 - 5/10. Appears more lethargic and now with difficult time clearing secretions and dysphagia.     - ABG now showing Co2 retention - plan for intubation   - Please consult transfusion medicine, recommend plasma exhange   - f/u on MG antibodies: Acetylcholine Blocking AB, Acetylcholine Modulating AB, Acetylcholine Receptor Binding Antibody, MuSK Antibody Test  - Pending MR brain w and wo con & MR cervical spine w and wo con once stable     Neurology will follow

## 2019-05-13 NOTE — PROCEDURE NOTE - NSICDXPROCEDURE_GEN_ALL_CORE_FT
PROCEDURES:  Insertion, feeding tube, Dobhoff 01-May-2019 15:00:06  King Goel
PROCEDURES:  Insertion, feeding tube, Dobhoff 01-May-2019 15:00:06  King Goel
PROCEDURES:  US guided central cath 13-May-2019 19:42:19  Kelton Field
PROCEDURES:  Tracheal intubation 08-May-2019 13:34:53  Kelton Field
PROCEDURES:  Tracheal intubation 08-May-2019 13:34:53  Kelton Field

## 2019-05-13 NOTE — PROGRESS NOTE ADULT - SUBJECTIVE AND OBJECTIVE BOX
NEUROLOGY CONSULT PROGRESS NOTE    INTERVAL HISTORY:  Extubated. Patient now w/ dysphagia and trouble clearing secretions.     MEDICATIONS:  acetaminophen   Tablet .. 650 milliGRAM(s) Oral every 6 hours PRN  chlorhexidine 2% Cloths 1 Application(s) Topical <User Schedule>  dextrose 5%. 1000 milliLiter(s) IV Continuous <Continuous>  dextrose 50% Injectable 25 Gram(s) IV Push once  enoxaparin Injectable 40 milliGRAM(s) SubCutaneous every 24 hours  ertapenem  IVPB 1000 milliGRAM(s) IV Intermittent every 24 hours  glucagon  Injectable 1 milliGRAM(s) IntraMuscular once PRN  insulin lispro (HumaLOG) corrective regimen sliding scale   SubCutaneous every 6 hours  lidocaine 1% Injectable 5 milliLiter(s) Local Injection once  nystatin Powder 1 Application(s) Topical three times a day  pantoprazole   Suspension 40 milliGRAM(s) Oral daily  petrolatum Ophthalmic Ointment 1 Application(s) Both EYES three times a day  tamoxifen 20 milliGRAM(s) Oral two times a day  zinc oxide 20% Ointment 1 Application(s) Topical three times a day    VITAL SIGNS:  Vital Signs Last 24 Hrs  T(C): 37.5 (13 May 2019 06:00), Max: 37.5 (13 May 2019 06:00)  T(F): 99.5 (13 May 2019 06:00), Max: 99.5 (13 May 2019 06:00)  HR: 114 (13 May 2019 10:00) (94 - 118)  BP: 104/62 (13 May 2019 10:00) (89/59 - 136/66)  BP(mean): 81 (13 May 2019 10:00) (69 - 93)  RR: 22 (13 May 2019 10:00) (17 - 26)  SpO2: 100% (13 May 2019 10:00) (94% - 100%)    PHYSICAL EXAMINATION:  Constitutional: Frail middle age female, extubated, hypotonic voice, NG tube  Eyes: Conjunctiva and sclera clear  Neurologic:  - Mental Status:  Awake and able to follow commands as directed  - Cranial Nerves II-XII:    II: Pupils are equal, round, and reactive to light.  III, IV, VI:  EOMI  V:  Facial sensation is intact  VII:  Strong eye closure   VIII:  Hearing is intact to finger rub.  XI:  Weak shoulder shrug, now able to turn head w/o assistance   - Motor: Hypotrophic muscles. Upper extremities 4/5 Lower extremities Leg 2/5 bilaterally, Ankle 0/5  - Reflexes:  0/5 reflexes at biceps, triceps, brachioradialis, knees, and ankles. No babinski response.   - Sensory:  Intact to light touch  - Gait: Deferred     LABS:    No labs drawn. NEUROLOGY CONSULT PROGRESS NOTE    INTERVAL HISTORY: Over the weekend patient has been extubated. Patient now w/ dysphagia and trouble clearing secretions.     MEDICATIONS:  acetaminophen   Tablet .. 650 milliGRAM(s) Oral every 6 hours PRN  chlorhexidine 2% Cloths 1 Application(s) Topical <User Schedule>  dextrose 5%. 1000 milliLiter(s) IV Continuous <Continuous>  dextrose 50% Injectable 25 Gram(s) IV Push once  enoxaparin Injectable 40 milliGRAM(s) SubCutaneous every 24 hours  ertapenem  IVPB 1000 milliGRAM(s) IV Intermittent every 24 hours  glucagon  Injectable 1 milliGRAM(s) IntraMuscular once PRN  insulin lispro (HumaLOG) corrective regimen sliding scale   SubCutaneous every 6 hours  lidocaine 1% Injectable 5 milliLiter(s) Local Injection once  nystatin Powder 1 Application(s) Topical three times a day  pantoprazole   Suspension 40 milliGRAM(s) Oral daily  petrolatum Ophthalmic Ointment 1 Application(s) Both EYES three times a day  tamoxifen 20 milliGRAM(s) Oral two times a day  zinc oxide 20% Ointment 1 Application(s) Topical three times a day    VITAL SIGNS:  Vital Signs Last 24 Hrs  T(C): 37.5 (13 May 2019 06:00), Max: 37.5 (13 May 2019 06:00)  T(F): 99.5 (13 May 2019 06:00), Max: 99.5 (13 May 2019 06:00)  HR: 114 (13 May 2019 10:00) (94 - 118)  BP: 104/62 (13 May 2019 10:00) (89/59 - 136/66)  BP(mean): 81 (13 May 2019 10:00) (69 - 93)  RR: 22 (13 May 2019 10:00) (17 - 26)  SpO2: 100% (13 May 2019 10:00) (94% - 100%)    PHYSICAL EXAMINATION:  Constitutional: Frail middle age female, extubated, hypotonic voice, NG tube, difficult clearing secretions   Eyes: Conjunctiva and sclera clear  Neurologic:  - Mental Status:  Awake and able to follow commands as directed  - Cranial Nerves II-XII:    II: Pupils are equal, round, and reactive to light.  III, IV, VI:  EOMI  V:  Facial sensation is intact  VII:  Strong eye closure   VIII:  Hearing is intact to finger rub.  XI:  Weak shoulder shrug, now able to turn head w/o assistance   - Motor: Hypotrophic muscles. Upper extremities 4-/5 Lower extremities Leg 2/5 bilaterally, Ankle 0/5  - Reflexes:  0/5 reflexes at biceps, triceps, brachioradialis, knees, and ankles. No babinski response.   - Sensory:  Intact to light touch  - Gait: Deferred     LABS:      Blood Gas Profile - Arterial (05.13.19 @ 13:58)    pH, Arterial: 7.26    pCO2, Arterial: 96: 05/13/19 14:00  Charis brown     notified   & read back mmHg    pO2, Arterial: 122 mmHg    HCO3, Arterial: 42 mmol/L    Base Excess, Arterial: 10.8 mmol/L    Oxygen Saturation, Arterial: 98 %

## 2019-05-13 NOTE — PROGRESS NOTE ADULT - PROBLEM SELECTOR PLAN 4
Per neurology, whether or not PPS is present should not be affecting her ascending/upper extremity weakness and respiratory insufficiency and is better explained by AIDP vs. CIDP  - mgmt otherwise as above in problem #2 and #3  -Patient disappointed that she has not noticed improvement in LE strength Per neurology, whether or not PPS is present should not be affecting her ascending/upper extremity weakness and respiratory insufficiency and is better explained by AIDP vs. CIDP  - mgmt otherwise as above in problem #2 and #3  - patient was disappointed that she had not noticed improvement in LE strength from IVIG

## 2019-05-13 NOTE — CONSULT NOTE ADULT - ASSESSMENT
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 She had a brief response to ivIG. I agree that she is a candidate for TPE. I discussed this with her sister (health care proxy). I reviewed the risk, benefits, alternatives to the procedure including (but not limited to): infection, bleeding, need for blood transfusion, hypocalcemia). She signed an informed consent. Treatment will begin today and continue every other day for five treatments,

## 2019-05-13 NOTE — CHART NOTE - NSCHARTNOTEFT_GEN_A_CORE
PGY1 Event Note    Throughout morning and early afternoon, patient noted to be increasingly lethargic, similar to presentation on [facility] when patient was intubated previously. Vitals notable for sinus tachycardia to 110s, O2sat 100%. On exam, patient with shallow breaths and tachypneic. Also barely able to speak, difficult to understand, and appeared confused. STAT abg showed CO2 96. After discussion with patient's sister (HCP), patient was intubated for CO2 narcosis. Patient tolerated procedure well. Per neurology, plan for plasmapheresis as just completed round of IVIG on 5/11. Will place catheter for plasmapheresis.

## 2019-05-13 NOTE — PROGRESS NOTE ADULT - SUBJECTIVE AND OBJECTIVE BOX
incomplete note    INTERVAL HPI/OVERNIGHT EVENTS:    OVERNIGHT: No overnight events.  SUBJECTIVE: Patient seen and examined at bedside.     ROS:  CV: Denies chest pain  Resp: Denies SOB  GI: Denies abdominal pain, constipation, diarrhea, nausea, vomiting  : Denies dysuria  ID: Denies fevers, chills  MSK: Denies joint pain     OBJECTIVE:    VITAL SIGNS:  ICU Vital Signs Last 24 Hrs  T(C): 36.4 (13 May 2019 01:07), Max: 37.1 (12 May 2019 18:12)  T(F): 97.6 (13 May 2019 01:07), Max: 98.7 (12 May 2019 18:12)  HR: 116 (13 May 2019 05:00) (90 - 116)  BP: 118/64 (13 May 2019 05:00) (89/59 - 136/66)  BP(mean): 83 (13 May 2019 05:00) (69 - 93)  ABP: --  ABP(mean): --  RR: 25 (13 May 2019 05:00) (16 - 32)  SpO2: 98% (13 May 2019 05:00) (93% - 100%)        05-11 @ 07:01  -  05-12 @ 07:00  --------------------------------------------------------  IN: 182 mL / OUT: 1355 mL / NET: -1173 mL    05-12 @ 07:01 - 05-13 @ 06:08  --------------------------------------------------------  IN: 946 mL / OUT: 1190 mL / NET: -244 mL      CAPILLARY BLOOD GLUCOSE      POCT Blood Glucose.: 225 mg/dL (13 May 2019 05:35)      PHYSICAL EXAM:  General: NAD, comfortable  HEENT: NCAT, PERRL, clear conjunctiva, no scleral icterus  Neck: supple, no JVD  Respiratory: CTA b/l, no wheezing, rhonchi, rales  Cardiovascular: RRR, normal S1S2, no M/R/G  Vascular: 2+ radial and DP pulses  Abdomen: soft, NT/ND, bowel sounds in all four quadrants, no palpable masses  Extremities: WWP, no clubbing, cyanosis, or edema  Skin: No rashes present  Neuro:     MEDICATIONS:  MEDICATIONS  (STANDING):  chlorhexidine 2% Cloths 1 Application(s) Topical <User Schedule>  dextrose 5%. 1000 milliLiter(s) (50 mL/Hr) IV Continuous <Continuous>  dextrose 50% Injectable 25 Gram(s) IV Push once  enoxaparin Injectable 40 milliGRAM(s) SubCutaneous every 24 hours  ertapenem  IVPB 1000 milliGRAM(s) IV Intermittent every 24 hours  insulin lispro (HumaLOG) corrective regimen sliding scale   SubCutaneous every 6 hours  nystatin Powder 1 Application(s) Topical three times a day  pantoprazole    Tablet 40 milliGRAM(s) Oral daily  petrolatum Ophthalmic Ointment 1 Application(s) Both EYES three times a day  tamoxifen 20 milliGRAM(s) Oral two times a day  zinc oxide 20% Ointment 1 Application(s) Topical three times a day    MEDICATIONS  (PRN):  acetaminophen   Tablet .. 650 milliGRAM(s) Oral every 6 hours PRN Temp greater or equal to 38C (100.4F), Mild Pain (1 - 3)  glucagon  Injectable 1 milliGRAM(s) IntraMuscular once PRN Glucose LESS THAN 70 milligrams/deciliter      ALLERGIES:  Allergies    No Known Allergies    Intolerances    IVIG PRODUCT IS PRIGIVEN OR GAMMUNEX (Unknown)      LABS:                RADIOLOGY & ADDITIONAL TESTS: Reviewed. TRANSFER MICU to [facility]   59 F w/ post-polio syndrome, Stage IV Endometrial Carcinoma s/p staging surgery 7/2018 and 1 cycle of chemo in 2/2019, s/p exlap, mobilization of splenic flexure, end colostomy, rectovaginal fistula w/ recurrent admissions UTIs/bacteremia, recently admitted for Proteus mirabilis ESBL septicemia in April on Meropenem, presented from [facility] in urosepsis, intubated in ED for airway protection and admitted to MICU. Patient was weaned off levophed and was extubated on 5/2 then transferred to GYN service. Surveillance blood cultures were negative. Both urine and initial blood cx growing ESBL e coli sensitive to meropenem and transitioned to ertapenem. [facility], concern for airway protection started on one course of IVIG (Privigen) 5/7. in 5/8 am, [facility] for [facility]neic respiratory failure and stepped up to MICU for CO2 narcosis 2/2 respiratory muscle insufficiency from AIDP. In the MICU, patient received 3 more doses of IVIG (total 4 doses) with good response. she was extubated on 5/10 and continues to improved daily in terms of strength. Patient is now stable for step down to  [facility].    INTERVAL HPI/OVERNIGHT EVENTS: KESHAWN    SUBJECTIVE: Patient seen and examined at bedside. Energy improves daily, States she feels well today but does not like the bipap. Denies headache, SOB, CP, abdominal pain    OBJECTIVE:    VITAL SIGNS:  ICU Vital Signs Last 24 Hrs  T(C): 36.4 (13 May 2019 01:07), Max: 37.1 (12 May 2019 18:12)  T(F): 97.6 (13 May 2019 01:07), Max: 98.7 (12 May 2019 18:12)  HR: 116 (13 May 2019 05:00) (90 - 116)  BP: 118/64 (13 May 2019 05:00) (89/59 - 136/66)  BP(mean): 83 (13 May 2019 05:00) (69 - 93)  ABP: --  ABP(mean): --  RR: 25 (13 May 2019 05:00) (16 - 32)  SpO2: 98% (13 May 2019 05:00) (93% - 100%)        05-11 @ 07:01 - 05-12 @ 07:00  --------------------------------------------------------  IN: 182 mL / OUT: 1355 mL / NET: -1173 mL    05-12 @ 07:01 - 05-13 @ 06:08  --------------------------------------------------------  IN: 946 mL / OUT: 1190 mL / NET: -244 mL      CAPILLARY BLOOD GLUCOSE      POCT Blood Glucose.: 225 mg/dL (13 May 2019 05:35)      PHYSICAL EXAM:  General: NAD, comfortable  HEENT: NCAT, PERRL, yellowish thick conjunctival discharge R>L, no scleral icterus. NG tube in place  Neck: supple, no JVD  Respiratory: CTA b/l, no wheezing, rhonchi, rales  Cardiovascular: tachycardic, regular rhythm, normal S1S2, no M/R/G  Vascular: 2+ radial and DP pulses  Abdomen: soft, NT/ND, bowel sounds present, colostomy w pink stoma no signs of infection  Extremities: WWP, no clubbing, cyanosis, or edema  Skin: No rashes present  Neuro: 4/5 strength throughout, no CN deficits    MEDICATIONS:  MEDICATIONS  (STANDING):  chlorhexidine 2% Cloths 1 Application(s) Topical <User Schedule>  dextrose 5%. 1000 milliLiter(s) (50 mL/Hr) IV Continuous <Continuous>  dextrose 50% Injectable 25 Gram(s) IV Push once  enoxaparin Injectable 40 milliGRAM(s) SubCutaneous every 24 hours  ertapenem  IVPB 1000 milliGRAM(s) IV Intermittent every 24 hours  insulin lispro (HumaLOG) corrective regimen sliding scale   SubCutaneous every 6 hours  nystatin Powder 1 Application(s) Topical three times a day  pantoprazole    Tablet 40 milliGRAM(s) Oral daily  petrolatum Ophthalmic Ointment 1 Application(s) Both EYES three times a day  tamoxifen 20 milliGRAM(s) Oral two times a day  zinc oxide 20% Ointment 1 Application(s) Topical three times a day    MEDICATIONS  (PRN):  acetaminophen   Tablet .. 650 milliGRAM(s) Oral every 6 hours PRN Temp greater or equal to 38C (100.4F), Mild Pain (1 - 3)  glucagon  Injectable 1 milliGRAM(s) IntraMuscular once PRN Glucose LESS THAN 70 milligrams/deciliter      ALLERGIES:  Allergies    No Known Allergies    Intolerances    IVIG PRODUCT IS PRIGIVEN OR GAMMUNEX (Unknown)      LABS:                RADIOLOGY & ADDITIONAL TESTS: Reviewed. Hospital Course   59 F w/ post-polio syndrome, Stage IV Endometrial Carcinoma s/p staging surgery 7/2018 and 1 cycle of chemo in 2/2019, s/p exlap, mobilization of splenic flexure, end colostomy, rectovaginal fistula w/ recurrent admissions UTIs/bacteremia, recently admitted for Proteus mirabilis ESBL septicemia in April on Meropenem, presented from [facility] in urosepsis, intubated in ED for airway protection and admitted to MICU. Patient was weaned off levophed and was extubated on 5/2 then transferred to GYN service. Surveillance blood cultures were negative. Both urine and initial blood cx growing ESBL e coli sensitive to meropenem and transitioned to ertapenem. Pt then transferred to [facility] for concern for worsening neuro exam, concern for airway protection started on one course of IVIG (Privigen) 5/7. in 5/8 am, patient intubated on [facility] for hypercapneic respiratory failure and stepped up to MICU for CO2 narcosis 2/2 respiratory muscle insufficiency from AIDP. In the MICU, patient received 3 more doses of IVIG (total 4 doses) with good response. She was extubated on 5/10 and is stable, however still tenuous and high risk of reintubation.    INTERVAL HPI/OVERNIGHT EVENTS: KESHAWN    SUBJECTIVE: Patient seen and examined at bedside. States that energy improves daily, States she feels well today but does not like the bipap. Denies headache, SOB, CP, abdominal pain    OBJECTIVE:    VITAL SIGNS:  ICU Vital Signs Last 24 Hrs  T(C): 36.4 (13 May 2019 01:07), Max: 37.1 (12 May 2019 18:12)  T(F): 97.6 (13 May 2019 01:07), Max: 98.7 (12 May 2019 18:12)  HR: 116 (13 May 2019 05:00) (90 - 116)  BP: 118/64 (13 May 2019 05:00) (89/59 - 136/66)  BP(mean): 83 (13 May 2019 05:00) (69 - 93)  ABP: --  ABP(mean): --  RR: 25 (13 May 2019 05:00) (16 - 32)  SpO2: 98% (13 May 2019 05:00) (93% - 100%)        05-11 @ 07:01  -  05-12 @ 07:00  --------------------------------------------------------  IN: 182 mL / OUT: 1355 mL / NET: -1173 mL    05-12 @ 07:01  - 05-13 @ 06:08  --------------------------------------------------------  IN: 946 mL / OUT: 1190 mL / NET: -244 mL      CAPILLARY BLOOD GLUCOSE      POCT Blood Glucose.: 225 mg/dL (13 May 2019 05:35)      PHYSICAL EXAM:  General: NAD, comfortable  HEENT: NCAT, PERRL, yellowish thick conjunctival discharge R>L, no scleral icterus. NG tube in place  Neck: supple, no JVD  Respiratory: CTA b/l, no wheezing, rhonchi, rales  Cardiovascular: tachycardic, regular rhythm, normal S1S2, no M/R/G  Vascular: 2+ radial and DP pulses  Abdomen: soft, NT/ND, bowel sounds present, colostomy w pink stoma no signs of infection  Extremities: WWP, no clubbing, cyanosis, or edema  Skin: No rashes present  Neuro: 4/5 strength UEs, 3/5 LEs, no CN deficits    MEDICATIONS:  MEDICATIONS  (STANDING):  chlorhexidine 2% Cloths 1 Application(s) Topical <User Schedule>  dextrose 5%. 1000 milliLiter(s) (50 mL/Hr) IV Continuous <Continuous>  dextrose 50% Injectable 25 Gram(s) IV Push once  enoxaparin Injectable 40 milliGRAM(s) SubCutaneous every 24 hours  ertapenem  IVPB 1000 milliGRAM(s) IV Intermittent every 24 hours  insulin lispro (HumaLOG) corrective regimen sliding scale   SubCutaneous every 6 hours  nystatin Powder 1 Application(s) Topical three times a day  pantoprazole    Tablet 40 milliGRAM(s) Oral daily  petrolatum Ophthalmic Ointment 1 Application(s) Both EYES three times a day  tamoxifen 20 milliGRAM(s) Oral two times a day  zinc oxide 20% Ointment 1 Application(s) Topical three times a day    MEDICATIONS  (PRN):  acetaminophen   Tablet .. 650 milliGRAM(s) Oral every 6 hours PRN Temp greater or equal to 38C (100.4F), Mild Pain (1 - 3)  glucagon  Injectable 1 milliGRAM(s) IntraMuscular once PRN Glucose LESS THAN 70 milligrams/deciliter      ALLERGIES:  Allergies    No Known Allergies    Intolerances    IVIG PRODUCT IS PRIGIVEN OR GAMMUNEX (Unknown)      LABS:                RADIOLOGY & ADDITIONAL TESTS: Reviewed.

## 2019-05-13 NOTE — CONSULT NOTE ADULT - SUBJECTIVE AND OBJECTIVE BOX
Asked by the [facility] critical care team on behalf of Dr. Cano to evaluate her for possible therapeutic plasma exchange to treat what is felt to be AIDP.      HPI form chart:  58 yo G0 w/ known stage IV endometrial carcinoma s/p staging surgery 7/2018 and 1 cycle of chemotherapy in 2/2019 followed by multiple admissions for management of symptomatic enterovaginal fistula, complex fistula associated urinary tract infection, bacteremia presents on referral from [facility] after fever today of 100.7. Pt reports poor PO intake, nausea w/ dry heaving and bringing up phlegm for the past 2 days. She also reports continued weakness. She denies chills, HA, dizziness, CP, palpitations, SOB, abdominal pain, vaginal bleeding, leakage of stool per vagina, abnormal vaginal discharge. She states she was not ambulating daily at [facility] however was working with PT there.    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 During this admission, she developed upper extremity weakness and respiratory distress, and she was intubated. She was then felt to have an acute inflammatory disorder Guillain-Barré like illness). She received ivIG and had transient improvement and was extubated. However, she then deteriorated and today she required re-intubation. The neurology team is now requesting plasma exchange.     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 1000 milliLiter(s) (50 mL/Hr) IV Continuous <Continuous>  dextrose 50% Injectable 25 Gram(s) IV Push once  enoxaparin Injectable 40 milliGRAM(s) SubCutaneous every 24 hours  ertapenem  IVPB 1000 milliGRAM(s) IV Intermittent every 24 hours  heparin  flush 100 Units/mL Injectable 1000 Unit(s) IV Push once  insulin lispro (HumaLOG) corrective regimen sliding scale   SubCutaneous every 6 hours  magnesium sulfate  IVPB 2 Gram(s) IV Intermittent once  nystatin Powder 1 Application(s) Topical three times a day  pantoprazole   Suspension 40 milliGRAM(s) Oral daily  petrolatum Ophthalmic Ointment 1 Application(s) Both EYES three times a day  tamoxifen 20 milliGRAM(s) Oral two times a day  zinc oxide 20% Ointment 1 Application(s) Topical three times a day    MEDICATIONS  (PRN):  acetaminophen   Tablet .. 650 milliGRAM(s) Oral every 6 hours PRN Temp greater or equal to 38C (100.4F), Mild Pain (1 - 3)  glucagon  Injectable 1 milliGRAM(s) IntraMuscular once PRN Glucose LESS THAN 70 milligrams/deciliter            Allergies    No Known Allergies    Intolerances    IVIG PRODUCT IS PRIGIVEN OR GAMMUNEX (Unknown)      REVIEW OF SYSTEMS: Patient is intubated and sedated and unable to answer questions    Unable to obtain:    Vital Signs Last 24 Hrs  T(C): 37 (13 May 2019 11:16), Max: 37.5 (13 May 2019 06:00)  T(F): 98.6 (13 May 2019 11:16), Max: 99.5 (13 May 2019 06:00)  HR: 82 (13 May 2019 15:30) (82 - 118)  BP: 104/67 (13 May 2019 14:55) (70/58 - 170/89)  BP(mean): 72 (13 May 2019 14:55) (65 - 120)  RR: 18 (13 May 2019 15:30) (17 - 51)  SpO2: 100% (13 May 2019 15:30) (96% - 100%)    Daily     Daily     PHYSICAL EXAM:  General: sedated and intubated   Respiratory: Clear to anterior auscultation  CV: RRR, no murmurs  Abdominal: Soft, NT, ND +BS  Ext: SCD in place; no edema                             8.3    6.72  )-----------( 304      ( 13 May 2019 14:00 )             27.5       Hematocrit: 27.5 % (05-13 @ 14:00)    05-13    148<H>  |  102  |  21  ----------------------------<  190<H>  4.2   |  39<H>  |  0.40<L>    Ca    10.0      13 May 2019 14:00  Phos  2.8     05-13  Mg     1.7     05-13    TPro  9.6<H>  /  Alb  3.1<L>  /  TBili  0.4  /  DBili  x   /  AST  14  /  ALT  11  /  AlkPhos  76  05-13                PT/INR - ( 13 May 2019 14:00 )   PT: 12.2 sec;   INR: 1.08          PTT - ( 13 May 2019 14:00 )  PTT:30.4 sec

## 2019-05-13 NOTE — PROGRESS NOTE ADULT - SUBJECTIVE AND OBJECTIVE BOX
GYN Progress Note    Patient seen at bedside.  No complaints. She is still coughing up secretions but it has improved.  she is on tube feeds, can swallow tablets therefore taking tamoxifen po.   on ertapenem until 5/14.   currently no respiratory distress.    Vital Signs Last 24 Hrs  T(C): 37.5 (13 May 2019 06:00), Max: 37.5 (13 May 2019 06:00)  T(F): 99.5 (13 May 2019 06:00), Max: 99.5 (13 May 2019 06:00)  HR: 114 (13 May 2019 08:00) (94 - 116)  BP: 105/57 (13 May 2019 08:00) (89/59 - 136/66)  BP(mean): 82 (13 May 2019 08:00) (69 - 93)  RR: 23 (13 May 2019 08:00) (16 - 32)  SpO2: 98% (13 May 2019 08:00) (93% - 100%)    Physical Exam:  Gen: No Acute Distress  Pulm: Clear to auscultation bilaterally  GI: soft, appropriately nontender, nondistended, no rebound, no guarding, ostomy - stoma pink with air in bag  rotated to see sacral ulcer but it covered with padding   : urine in joseph appears turbid  Ext: SCDs in place, wnl, no pain or edema, strength 2/5 in lower extremity; improved strength in upper extremity    I&O's Summary    12 May 2019 07:01  -  13 May 2019 07:00  --------------------------------------------------------  IN: 1050 mL / OUT: 1290 mL / NET: -240 mL    13 May 2019 07:01  -  13 May 2019 08:26  --------------------------------------------------------  IN: 44 mL / OUT: 60 mL / NET: -16 mL      MEDICATIONS  (STANDING):  chlorhexidine 2% Cloths 1 Application(s) Topical <User Schedule>  dextrose 5%. 1000 milliLiter(s) (50 mL/Hr) IV Continuous <Continuous>  dextrose 50% Injectable 25 Gram(s) IV Push once  enoxaparin Injectable 40 milliGRAM(s) SubCutaneous every 24 hours  ertapenem  IVPB 1000 milliGRAM(s) IV Intermittent every 24 hours  insulin lispro (HumaLOG) corrective regimen sliding scale   SubCutaneous every 6 hours  nystatin Powder 1 Application(s) Topical three times a day  pantoprazole    Tablet 40 milliGRAM(s) Oral daily  petrolatum Ophthalmic Ointment 1 Application(s) Both EYES three times a day  tamoxifen 20 milliGRAM(s) Oral two times a day  zinc oxide 20% Ointment 1 Application(s) Topical three times a day    MEDICATIONS  (PRN):  acetaminophen   Tablet .. 650 milliGRAM(s) Oral every 6 hours PRN Temp greater or equal to 38C (100.4F), Mild Pain (1 - 3)  glucagon  Injectable 1 milliGRAM(s) IntraMuscular once PRN Glucose LESS THAN 70 milligrams/deciliter      LABS: GYN Progress Note    Patient seen at bedside.  No complaints. She is still coughing up secretions but it has improved.  she is on tube feeds, can swallow tablets therefore taking tamoxifen po.   on ertapenem until 5/14.   currently no respiratory distress.  update: per swallow eval, PO is premature today, they will continue to assess    Vital Signs Last 24 Hrs  T(C): 37.5 (13 May 2019 06:00), Max: 37.5 (13 May 2019 06:00)  T(F): 99.5 (13 May 2019 06:00), Max: 99.5 (13 May 2019 06:00)  HR: 114 (13 May 2019 08:00) (94 - 116)  BP: 105/57 (13 May 2019 08:00) (89/59 - 136/66)  BP(mean): 82 (13 May 2019 08:00) (69 - 93)  RR: 23 (13 May 2019 08:00) (16 - 32)  SpO2: 98% (13 May 2019 08:00) (93% - 100%)    Physical Exam:  Gen: No Acute Distress  Pulm: Clear to auscultation bilaterally  GI: soft, appropriately nontender, nondistended, no rebound, no guarding, ostomy - stoma pink with air in bag  rotated to see sacral ulcer but it covered with padding   : urine in joseph appears turbid  Ext: SCDs in place, wnl, no pain or edema, strength 2/5 in lower extremity; improved strength in upper extremity    I&O's Summary    12 May 2019 07:01  -  13 May 2019 07:00  --------------------------------------------------------  IN: 1050 mL / OUT: 1290 mL / NET: -240 mL    13 May 2019 07:01  -  13 May 2019 08:26  --------------------------------------------------------  IN: 44 mL / OUT: 60 mL / NET: -16 mL      MEDICATIONS  (STANDING):  chlorhexidine 2% Cloths 1 Application(s) Topical <User Schedule>  dextrose 5%. 1000 milliLiter(s) (50 mL/Hr) IV Continuous <Continuous>  dextrose 50% Injectable 25 Gram(s) IV Push once  enoxaparin Injectable 40 milliGRAM(s) SubCutaneous every 24 hours  ertapenem  IVPB 1000 milliGRAM(s) IV Intermittent every 24 hours  insulin lispro (HumaLOG) corrective regimen sliding scale   SubCutaneous every 6 hours  nystatin Powder 1 Application(s) Topical three times a day  pantoprazole    Tablet 40 milliGRAM(s) Oral daily  petrolatum Ophthalmic Ointment 1 Application(s) Both EYES three times a day  tamoxifen 20 milliGRAM(s) Oral two times a day  zinc oxide 20% Ointment 1 Application(s) Topical three times a day    MEDICATIONS  (PRN):  acetaminophen   Tablet .. 650 milliGRAM(s) Oral every 6 hours PRN Temp greater or equal to 38C (100.4F), Mild Pain (1 - 3)  glucagon  Injectable 1 milliGRAM(s) IntraMuscular once PRN Glucose LESS THAN 70 milligrams/deciliter      LABS:

## 2019-05-13 NOTE — SWALLOW BEDSIDE ASSESSMENT ADULT - SLP PERTINENT HISTORY OF CURRENT PROBLEM
s/p intubation-extubation on 5/10 for hypercapnic respiratory failure secondary to demyelinating polyneuropathy

## 2019-05-13 NOTE — PROGRESS NOTE ADULT - SUBJECTIVE AND OBJECTIVE BOX
TIM MCDANIEL             MRN-[id number]    CC:  weakness, [facility]    HPI:  58 yo G0 w/ known stage IV endometrial carcinoma s/p staging surgery 7/2018 and 1 cycle of chemotherapy in 2/2019 followed by multiple admissions for management of symptomatic enterovaginal fistula, complex fistula associated urinary tract infection, bacteremia presents on referral from [facility] after fever today of 100.7. Pt reports poor PO intake, nausea w/ dry heaving and bringing up phlegm for the past 2 days. She also reports continued weakness. She denies chills, HA, dizziness, CP, palpitations, SOB, abdominal pain, vaginal bleeding, leakage of stool per vagina, abnormal vaginal discharge. She states she was not ambulating daily at [facility] however was working with PT there.    OB/GYN Hx: G0, stage 4 endometrial cancer dx in 7/2018; h/o breast cancer in 2009 s/p chemo and radiation with left breast lumpectomy  PMHx: T2DM, polio in childhood, h/o breast cancer  SHx: left breast lumpectomy, right knee surgery with screw placement 2001, 7/2018 exploratory laparotomy, enterolysis, SHAMIR, BSO, pelvic lymphadenectomy, low anterior resection mobilization of splenic flexure, end colostomy   Allergies: NKDA (29 Apr 2019 23:11)    SUBJECTIVE:  Very weak today, markedly worse since Friday and per team even worse than over the weekend where she was out of bed to chair. Remains off the ventilator for now. She cannot bring her head up off the bed again, cannot raise arms above shoulders -- struggled to try and use her right hand to help move her head. Expresses frustration and discomfort with having the NGT in her nose for feeding - says she is able to swallow (despite S&S evaluation). Wonders why she is so sick again and in response to our explanation that the sepsis/infection was only the initial problem that was treated, and that this is a completely different problem; she responds saying "well shit."     ROS:  DYSPNEA: Yes, ~5  NAUS/VOM: No  SECRETIONS: Yes, some difficulty clearing secretions	  AGITATION: No  Pain (Y/N): No  -Provocation/Palliation:  -Quality/Quantity:  -Radiating:  -Severity:  -Timing/Frequency:  -Impact on ADLs:    OTHER REVIEW OF SYSTEMS:  UNABLE TO OBTAIN  due to:    PEx:  T(C): 37 (05-13-19 @ 11:16), Max: 37.5 (05-13-19 @ 06:00)  HR: 118 (05-13-19 @ 13:00) (94 - 118)  BP: 130/98 (05-13-19 @ 13:00) (89/59 - 136/66)  RR: 28 (05-13-19 @ 13:00) (17 - 30)  SpO2: 99% (05-13-19 @ 13:00) (97% - 100%)  Wt(kg): --    General: frail female, chronically ill appearing resting comfortably in bed; appearing older than stated age, pale  HEENT: moderate alopecia; conjunctival pallor; very hypophonic speech, moist mucous membranes with secretions  Neck: supple  CVS: S1/S2, RRR  Resp: shallow breathing and speaking in short sentences only  GI: soft, non-tender  Musc: hypotrophic muscularity and bulk, particularly of UE/hands, LE; profound kyphoscoliosis muscle wasting of left trapezius and latissimus dorsi  Neuro: awake and alert; intervally worsening weakness, unable to lift arms above shoulder level or lift head up off bed  Psych: depressed mood, euthymic affect; pleasant  Skin: intact	     ALLERGIES: IVIG PRODUCT IS PRIGIVEN OR GAMMUNEX (Unknown)  No Known Allergies    OPIATE NAÏVE (Y/N): Yes on presentation, had received opiates earlier in admission    MEDICATIONS: REVIEWED  MEDICATIONS  (STANDING):  chlorhexidine 2% Cloths 1 Application(s) Topical <User Schedule>  dextrose 5%. 1000 milliLiter(s) (50 mL/Hr) IV Continuous <Continuous>  dextrose 50% Injectable 25 Gram(s) IV Push once  enoxaparin Injectable 40 milliGRAM(s) SubCutaneous every 24 hours  ertapenem  IVPB 1000 milliGRAM(s) IV Intermittent every 24 hours  insulin lispro (HumaLOG) corrective regimen sliding scale   SubCutaneous every 6 hours  lidocaine 1% Injectable 5 milliLiter(s) Local Injection once  nystatin Powder 1 Application(s) Topical three times a day  pantoprazole   Suspension 40 milliGRAM(s) Oral daily  petrolatum Ophthalmic Ointment 1 Application(s) Both EYES three times a day  tamoxifen 20 milliGRAM(s) Oral two times a day  zinc oxide 20% Ointment 1 Application(s) Topical three times a day    MEDICATIONS  (PRN):  acetaminophen   Tablet .. 650 milliGRAM(s) Oral every 6 hours PRN Temp greater or equal to 38C (100.4F), Mild Pain (1 - 3)  glucagon  Injectable 1 milliGRAM(s) IntraMuscular once PRN Glucose LESS THAN 70 milligrams/deciliter      LABS: REVIEWED  CBC:    CMP:        IMAGING: REVIEWED    ADVANCED DIRECTIVES:     FULL CODE    DECISION MAKER: patient/self  LEGAL SURROGATE: Esha Avila (sister) [phone]    PSYCHOSOCIAL-SPIRITUAL ASSESSMENT:       Reviewed       Care plan unchanged	    GOALS OF CARE DISCUSSION       Palliative care info/counseling provided	           Family meeting       Advanced Directives addressed please see Advance Care Planning Note	           See previous Palliative Medicine Note       Documentation of GOC: FULL CODE  	      AGENCY CHOICE DISCUSSED:          Not appropriate for discussion at present time    REFERRALS	        Palliative Med        Unit SW/Case Mgmt        - declined       Speech/Swallow - failed dysphagia; has NGT currently       Patient/Family Support       Massage Therapy       Music Therapy       Hospice - not applicable at present       Nutrition       PT/OT TIM MCDANIEL             [id number]    CC:  weakness, [facility]    HPI:  58 yo G0 w/ known stage IV endometrial carcinoma s/p staging surgery 7/2018 and 1 cycle of chemotherapy in 2/2019 followed by multiple admissions for management of symptomatic enterovaginal fistula, complex fistula associated urinary tract infection, bacteremia presents on referral from [facility] after fever today of 100.7. Pt reports poor PO intake, nausea w/ dry heaving and bringing up phlegm for the past 2 days. She also reports continued weakness. She denies chills, HA, dizziness, CP, palpitations, SOB, abdominal pain, vaginal bleeding, leakage of stool per vagina, abnormal vaginal discharge. She states she was not ambulating daily at [facility] however was working with PT there.    OB/GYN Hx: G0, stage 4 endometrial cancer dx in 7/2018; h/o breast cancer in 2009 s/p chemo and radiation with left breast lumpectomy  PMHx: T2DM, polio in childhood, h/o breast cancer  SHx: left breast lumpectomy, right knee surgery with screw placement 2001, 7/2018 exploratory laparotomy, enterolysis, SHAMIR, BSO, pelvic lymphadenectomy, low anterior resection mobilization of splenic flexure, end colostomy   Allergies: NKDA (29 Apr 2019 23:11)    SUBJECTIVE:  Very weak today, markedly worse since Friday and per team even worse than over the weekend where she was out of bed to chair. Remains off the ventilator for now. She cannot bring her head up off the bed again, cannot raise arms above shoulders -- struggled to try and use her right hand to help move her head. Expresses frustration and discomfort with having the NGT in her nose for feeding - says she is able to swallow (despite S&S evaluation). Wonders why she is so sick again and in response to our explanation that the sepsis/infection was only the initial problem that was treated, and that this is a completely different problem; she responds saying "well shit."     ROS:  DYSPNEA: Yes, ~5  NAUS/VOM: No  SECRETIONS: Yes, some difficulty clearing secretions	  AGITATION: No  Pain (Y/N): No  -Provocation/Palliation:  -Quality/Quantity:  -Radiating:  -Severity:  -Timing/Frequency:  -Impact on ADLs:    OTHER REVIEW OF SYSTEMS:  UNABLE TO OBTAIN  due to:    PEx:  T(C): 37 (05-13-19 @ 11:16), Max: 37.5 (05-13-19 @ 06:00)  HR: 118 (05-13-19 @ 13:00) (94 - 118)  BP: 130/98 (05-13-19 @ 13:00) (89/59 - 136/66)  RR: 28 (05-13-19 @ 13:00) (17 - 30)  SpO2: 99% (05-13-19 @ 13:00) (97% - 100%)  Wt(kg): --    General: frail female, chronically ill appearing  uncomfortable in bed; appearing older than stated age, pale  HEENT: moderate alopecia; conjunctival pallor; very hypophonic speech, moist mucous membranes with secretions ? ptosis vs weakness of lids  Neck: supple, but very weak- cannot maintain posture  CVS: S1/S2, RRR  Resp: shallow breathing and speaking in short sentences only  GI: soft, non-tender  Musc: hypotrophic muscularity and bulk, particularly of UE/hands, LE; profound kyphoscoliosis muscle wasting of left trapezius and latissimus dorsi  Neuro: awake and alert; interval worsening weakness, unable to lift arms above shoulder level or lift head up off bed  Psych: depressed mood, euthymic affect; pleasant  Skin: intact	     ALLERGIES: IVIG PRODUCT IS PRIGIVEN OR GAMMUNEX (Unknown)  No Known Allergies    OPIATE NAÏVE (Y/N): Yes on presentation, had received opiates earlier in admission    MEDICATIONS: REVIEWED  MEDICATIONS  (STANDING):  chlorhexidine 2% Cloths 1 Application(s) Topical <User Schedule>  dextrose 5%. 1000 milliLiter(s) (50 mL/Hr) IV Continuous <Continuous>  dextrose 50% Injectable 25 Gram(s) IV Push once  enoxaparin Injectable 40 milliGRAM(s) SubCutaneous every 24 hours  ertapenem  IVPB 1000 milliGRAM(s) IV Intermittent every 24 hours  insulin lispro (HumaLOG) corrective regimen sliding scale   SubCutaneous every 6 hours  lidocaine 1% Injectable 5 milliLiter(s) Local Injection once  nystatin Powder 1 Application(s) Topical three times a day  pantoprazole   Suspension 40 milliGRAM(s) Oral daily  petrolatum Ophthalmic Ointment 1 Application(s) Both EYES three times a day  tamoxifen 20 milliGRAM(s) Oral two times a day  zinc oxide 20% Ointment 1 Application(s) Topical three times a day    MEDICATIONS  (PRN):  acetaminophen   Tablet .. 650 milliGRAM(s) Oral every 6 hours PRN Temp greater or equal to 38C (100.4F), Mild Pain (1 - 3)  glucagon  Injectable 1 milliGRAM(s) IntraMuscular once PRN Glucose LESS THAN 70 milligrams/deciliter      LABS: REVIEWED  Blood Gas Profile - Arterial (05.13.19 @ 13:58)    pH, Arterial: 7.26    pCO2, Arterial: 96: 05/13/19 14:00  Charis brown     notified   & read back mmHg    pO2, Arterial: 122 mmHg    HCO3, Arterial: 42 mmol/L    Base Excess, Arterial: 10.8 mmol/L    Oxygen Saturation, Arterial: 98 %                          8.3    6.72  )-----------( 304      ( 13 May 2019 14:00 )             27.5   13 May 2019 14:00    148    |  102    |  21     ----------------------------<  190    4.2     |  39     |  0.40     Ca    10.0       13 May 2019 14:00  Phos  2.8       13 May 2019 14:00  Mg     1.7       13 May 2019 14:00    TPro  9.6    /  Alb  3.1    /  TBili  0.4    /  DBili  x      /  AST  14     /  ALT  11     /  AlkPhos  76     13 May 2019 14:00          IMAGING: REVIEWED    ADVANCED DIRECTIVES:     FULL CODE    DECISION MAKER: patient/self  LEGAL SURROGATE: Esha Avila (sister) [phone]    PSYCHOSOCIAL-SPIRITUAL ASSESSMENT:       Reviewed       Care plan unchanged	    GOALS OF CARE DISCUSSION       Palliative care info/counseling provided previously.  Awaiting arrival of Esha roblero	           Family meeting       Advanced Directives addressed please see Advance Care Planning Note	           See previous Palliative Medicine Notes       Documentation of GOC: FULL CODE, but patient has indicated on several occassions that she does not want to live in long term care facility and she worries that this is her future  	      AGENCY CHOICE DISCUSSED:          Not appropriate for discussion at present time    REFERRALS	        Palliative Med        Unit SW/Case Mgmt        - declined       Speech/Swallow - failed dysphagia; has NGT currently       Patient/Family Support       Massage Therapy       Music Therapy       Hospice - not applicable at present       Nutrition       PT/OT

## 2019-05-13 NOTE — PROGRESS NOTE ADULT - PROBLEM SELECTOR PLAN 2
Per neurology based on EMG data; IMPROVING now on IVIG.  - per neurology and primary team planned for 4 total days of IVIG w/ [employer].   - appreciate neurology input and management recommendations  - mgmt otherwise as below in problem #4  - prognosis overall remains guarded given her multiple chronic comorbidities and the prospect of cytotoxic chemotherapy initiation for her malignancy Initially improved on IVIG however weakness has relapsed in last 12-24hrs and significantly worse - similar to how she was last week prior to last intubation on step-down unit.  - s/p IVIG already  - appreciate neurology input and management recommendations; ?plasma exchange planned for future  - mgmt otherwise as below in problem #4  - prognosis overall remains guarded given her multiple chronic comorbidities, the prospect of cytotoxic chemotherapy initiation for her malignancy per GYN, and now with the third need for intubation  - patient expresses frustration and disappointment her UE/upper body weakness returned

## 2019-05-13 NOTE — SWALLOW BEDSIDE ASSESSMENT ADULT - ASR SWALLOW ASPIRATION MONITOR
throat clearing/upper respiratory infection/pneumonia/change of breathing pattern/cough/oral hygiene/position upright (90Y)/gurgly voice/fever

## 2019-05-13 NOTE — SWALLOW BEDSIDE ASSESSMENT ADULT - NS SPL SWALLOW CLINIC TRIAL FT
Oral stage is significant for decreased bolus containment with anterior bolus loss and prolonged bolus formation and manipulation. Pharyngeal stage is significant for suspected delay in swallow initiation, overall weak swallow to palpation and overt signs of airway protection deficits. Given above presentation along with increased secretions, poor vocal quality and weak cough response, Pt is at high aspiration risk. PO is premature today. This service will continue to follow.

## 2019-05-13 NOTE — PROCEDURE NOTE - NSTRACHPOSTINTU_RESP_A_CORE
Breath sounds bilateral/Breath sounds equal/Chest X-Ray/Chest Xray Pending/Appropriate capnography/Chest excursion noted/Positive end tidal Co2 noted

## 2019-05-13 NOTE — PROGRESS NOTE ADULT - ASSESSMENT
58yo F HD14 with stage IV endometrial adenocarcinoma s/p staging surgery '18 and 1 round of chemotherapy 2/19 readmitted from [facility] with fever, previously admitted for management of symptomatic rectovaginal and enterovaginal fistulas. Presents on referral from [facility] with urosepsis.  Rapid response called due to worsening respiratory status s/p intubation and MICU admission. She then was on regional GYN service however had respiratory weakness and was transferred to Medicine/tele HD9 until requiring intubation again HD10 and is now in MICU. 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 Extubated on NC.   Management per MICU    1. 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 Neurology following-appreciate recs. CSF culture no organisms seen, MRI when stable, per neurology will treat with IVIG x2 days every 2wks  2. Pulm: Extubated 5/10 - transitioned to BiPAP initially, now on nasal canula 4L.  Glycopyrrolate given to help decrease secretions which are improved. Pulmonary percussion Tx per medicine team.   3. Cardio: Vitals improved, off pressor support. Appropriate urine output. Tachycardia, pt has SVT and also could be 2/2 glycopyrrolate (last taken 5/11).  4. FEN/GI: Dobhoff in situ, on tube feeds.  - TF were paused sat evening given concern for possible aspiration then restarted  - Patient passed dysphagia testing 5/12  - Recommend daily labs with ongoing electrolytes repletions PRN  5. : Adequate urine output, joseph in place.  - Joseph replaced 5/8 after finding of overflow incontinence, f/u urology recs regarding suprapubic catheter   - rec UA, Ucx today due to turbid urine  6. ID: IV ertapenem until 5/14, s/p meropenem, s/p vanc, appreciate ID recs  7. Endocrine: FS, ISS, Metformin 1000mg BID held at this time   8. VTE prophylaxis - SCDs, Lovenox 40mg daily   9. GYN malignancy  -Stopped anastrazole and initiated 3 weeks of megace 80mg BID followed by 3 weeks of tamoxifen.   - Started megace and metformin 4/10, now on Tamoxifen for 21 days (5/1/19-  now on pills.  -  Avoid immunotherapy at this time given associated risk of autoimmune disease   10. Derm: monitor sacrum for s/s of pressure ulcer, now stage 2  11. PT/OT needs evaluation, OOB with nursing staff  12. Social work/palliative: palliative care following. dispo plan to acute rehab when stable

## 2019-05-14 LAB
ACHR MOD AB SER-ACNC: 15 — SIGNIFICANT CHANGE UP
ALBUMIN SERPL ELPH-MCNC: 4.4 G/DL — SIGNIFICANT CHANGE UP (ref 3.3–5)
ALP SERPL-CCNC: 35 U/L — LOW (ref 40–120)
ALT FLD-CCNC: 6 U/L — LOW (ref 10–45)
ANION GAP SERPL CALC-SCNC: 5 MMOL/L — SIGNIFICANT CHANGE UP (ref 5–17)
ANION GAP SERPL CALC-SCNC: 5 MMOL/L — SIGNIFICANT CHANGE UP (ref 5–17)
ANION GAP SERPL CALC-SCNC: 7 MMOL/L — SIGNIFICANT CHANGE UP (ref 5–17)
ANION GAP SERPL CALC-SCNC: 8 MMOL/L — SIGNIFICANT CHANGE UP (ref 5–17)
APTT BLD: 27.5 SEC — SIGNIFICANT CHANGE UP (ref 27.5–36.3)
AST SERPL-CCNC: 13 U/L — SIGNIFICANT CHANGE UP (ref 10–40)
BILIRUB SERPL-MCNC: 0.5 MG/DL — SIGNIFICANT CHANGE UP (ref 0.2–1.2)
BUN SERPL-MCNC: 21 MG/DL — SIGNIFICANT CHANGE UP (ref 7–23)
BUN SERPL-MCNC: 23 MG/DL — SIGNIFICANT CHANGE UP (ref 7–23)
BUN SERPL-MCNC: 23 MG/DL — SIGNIFICANT CHANGE UP (ref 7–23)
BUN SERPL-MCNC: 25 MG/DL — HIGH (ref 7–23)
CALCIUM SERPL-MCNC: 8.7 MG/DL — SIGNIFICANT CHANGE UP (ref 8.4–10.5)
CALCIUM SERPL-MCNC: 9 MG/DL — SIGNIFICANT CHANGE UP (ref 8.4–10.5)
CALCIUM SERPL-MCNC: 9 MG/DL — SIGNIFICANT CHANGE UP (ref 8.4–10.5)
CALCIUM SERPL-MCNC: 9.6 MG/DL — SIGNIFICANT CHANGE UP (ref 8.4–10.5)
CHLORIDE SERPL-SCNC: 100 MMOL/L — SIGNIFICANT CHANGE UP (ref 96–108)
CHLORIDE SERPL-SCNC: 103 MMOL/L — SIGNIFICANT CHANGE UP (ref 96–108)
CHLORIDE SERPL-SCNC: 105 MMOL/L — SIGNIFICANT CHANGE UP (ref 96–108)
CHLORIDE SERPL-SCNC: 107 MMOL/L — SIGNIFICANT CHANGE UP (ref 96–108)
CO2 SERPL-SCNC: 32 MMOL/L — HIGH (ref 22–31)
CO2 SERPL-SCNC: 33 MMOL/L — HIGH (ref 22–31)
CO2 SERPL-SCNC: 34 MMOL/L — HIGH (ref 22–31)
CO2 SERPL-SCNC: 38 MMOL/L — HIGH (ref 22–31)
CREAT SERPL-MCNC: 0.36 MG/DL — LOW (ref 0.5–1.3)
CREAT SERPL-MCNC: 0.38 MG/DL — LOW (ref 0.5–1.3)
CREAT SERPL-MCNC: 0.38 MG/DL — LOW (ref 0.5–1.3)
CREAT SERPL-MCNC: 0.4 MG/DL — LOW (ref 0.5–1.3)
CULTURE RESULTS: SIGNIFICANT CHANGE UP
FIBRINOGEN PPP-MCNC: 257 MG/DL — LOW (ref 258–438)
GLUCOSE BLDC GLUCOMTR-MCNC: 213 MG/DL — HIGH (ref 70–99)
GLUCOSE BLDC GLUCOMTR-MCNC: 226 MG/DL — HIGH (ref 70–99)
GLUCOSE BLDC GLUCOMTR-MCNC: 233 MG/DL — HIGH (ref 70–99)
GLUCOSE BLDC GLUCOMTR-MCNC: 239 MG/DL — HIGH (ref 70–99)
GLUCOSE BLDC GLUCOMTR-MCNC: 239 MG/DL — HIGH (ref 70–99)
GLUCOSE SERPL-MCNC: 230 MG/DL — HIGH (ref 70–99)
GLUCOSE SERPL-MCNC: 254 MG/DL — HIGH (ref 70–99)
GLUCOSE SERPL-MCNC: 255 MG/DL — HIGH (ref 70–99)
GLUCOSE SERPL-MCNC: 264 MG/DL — HIGH (ref 70–99)
GRAM STN FLD: SIGNIFICANT CHANGE UP
HAPTOGLOB SERPL-MCNC: 170 MG/DL — SIGNIFICANT CHANGE UP (ref 34–200)
HCT VFR BLD CALC: 24.7 % — LOW (ref 34.5–45)
HGB BLD-MCNC: 7.7 G/DL — LOW (ref 11.5–15.5)
INR BLD: 1.22 — HIGH (ref 0.88–1.16)
LDH SERPL L TO P-CCNC: 186 U/L — SIGNIFICANT CHANGE UP (ref 50–242)
MAGNESIUM SERPL-MCNC: 2.1 MG/DL — SIGNIFICANT CHANGE UP (ref 1.6–2.6)
MAGNESIUM SERPL-MCNC: 2.4 MG/DL — SIGNIFICANT CHANGE UP (ref 1.6–2.6)
MCHC RBC-ENTMCNC: 29.2 PG — SIGNIFICANT CHANGE UP (ref 27–34)
MCHC RBC-ENTMCNC: 31.2 GM/DL — LOW (ref 32–36)
MCV RBC AUTO: 93.6 FL — SIGNIFICANT CHANGE UP (ref 80–100)
NRBC # BLD: 0 /100 WBCS — SIGNIFICANT CHANGE UP (ref 0–0)
PHOSPHATE SERPL-MCNC: 0.5 MG/DL — CRITICAL LOW (ref 2.5–4.5)
PHOSPHATE SERPL-MCNC: 1.4 MG/DL — LOW (ref 2.5–4.5)
PHOSPHATE SERPL-MCNC: 2 MG/DL — LOW (ref 2.5–4.5)
PHOSPHATE SERPL-MCNC: 3.2 MG/DL — SIGNIFICANT CHANGE UP (ref 2.5–4.5)
PLATELET # BLD AUTO: 275 K/UL — SIGNIFICANT CHANGE UP (ref 150–400)
POTASSIUM SERPL-MCNC: 2.8 MMOL/L — CRITICAL LOW (ref 3.5–5.3)
POTASSIUM SERPL-MCNC: 5 MMOL/L — SIGNIFICANT CHANGE UP (ref 3.5–5.3)
POTASSIUM SERPL-MCNC: 6.5 MMOL/L — CRITICAL HIGH (ref 3.5–5.3)
POTASSIUM SERPL-MCNC: 7.1 MMOL/L — CRITICAL HIGH (ref 3.5–5.3)
POTASSIUM SERPL-SCNC: 2.8 MMOL/L — CRITICAL LOW (ref 3.5–5.3)
POTASSIUM SERPL-SCNC: 5 MMOL/L — SIGNIFICANT CHANGE UP (ref 3.5–5.3)
POTASSIUM SERPL-SCNC: 6.5 MMOL/L — CRITICAL HIGH (ref 3.5–5.3)
POTASSIUM SERPL-SCNC: 7.1 MMOL/L — CRITICAL HIGH (ref 3.5–5.3)
PROT SERPL-MCNC: 6.6 G/DL — SIGNIFICANT CHANGE UP (ref 6–8.3)
PROTHROM AB SERPL-ACNC: 13.9 SEC — HIGH (ref 10–12.9)
RBC # BLD: 2.64 M/UL — LOW (ref 3.8–5.2)
RBC # FLD: 15.1 % — HIGH (ref 10.3–14.5)
SODIUM SERPL-SCNC: 141 MMOL/L — SIGNIFICANT CHANGE UP (ref 135–145)
SODIUM SERPL-SCNC: 142 MMOL/L — SIGNIFICANT CHANGE UP (ref 135–145)
SODIUM SERPL-SCNC: 145 MMOL/L — SIGNIFICANT CHANGE UP (ref 135–145)
SODIUM SERPL-SCNC: 149 MMOL/L — HIGH (ref 135–145)
SPECIMEN SOURCE: SIGNIFICANT CHANGE UP
SPECIMEN SOURCE: SIGNIFICANT CHANGE UP
WBC # BLD: 9.12 K/UL — SIGNIFICANT CHANGE UP (ref 3.8–10.5)
WBC # FLD AUTO: 9.12 K/UL — SIGNIFICANT CHANGE UP (ref 3.8–10.5)

## 2019-05-14 PROCEDURE — 93010 ELECTROCARDIOGRAM REPORT: CPT

## 2019-05-14 PROCEDURE — 99233 SBSQ HOSP IP/OBS HIGH 50: CPT | Mod: GC

## 2019-05-14 PROCEDURE — 71045 X-RAY EXAM CHEST 1 VIEW: CPT | Mod: 26

## 2019-05-14 PROCEDURE — 99291 CRITICAL CARE FIRST HOUR: CPT

## 2019-05-14 PROCEDURE — 99232 SBSQ HOSP IP/OBS MODERATE 35: CPT

## 2019-05-14 PROCEDURE — 99233 SBSQ HOSP IP/OBS HIGH 50: CPT

## 2019-05-14 PROCEDURE — 93010 ELECTROCARDIOGRAM REPORT: CPT | Mod: 77

## 2019-05-14 RX ORDER — POTASSIUM CHLORIDE 20 MEQ
40 PACKET (EA) ORAL EVERY 4 HOURS
Refills: 0 | Status: COMPLETED | OUTPATIENT
Start: 2019-05-14 | End: 2019-05-14

## 2019-05-14 RX ORDER — DOCUSATE SODIUM 100 MG
100 CAPSULE ORAL DAILY
Refills: 0 | Status: DISCONTINUED | OUTPATIENT
Start: 2019-05-14 | End: 2019-05-28

## 2019-05-14 RX ORDER — DEXTROSE 50 % IN WATER 50 %
15 SYRINGE (ML) INTRAVENOUS ONCE
Refills: 0 | Status: DISCONTINUED | OUTPATIENT
Start: 2019-05-14 | End: 2019-06-19

## 2019-05-14 RX ORDER — CALCIUM GLUCONATE 100 MG/ML
2 VIAL (ML) INTRAVENOUS ONCE
Refills: 0 | Status: COMPLETED | OUTPATIENT
Start: 2019-05-14 | End: 2019-05-14

## 2019-05-14 RX ORDER — DEXTROSE 50 % IN WATER 50 %
50 SYRINGE (ML) INTRAVENOUS ONCE
Refills: 0 | Status: COMPLETED | OUTPATIENT
Start: 2019-05-14 | End: 2019-05-14

## 2019-05-14 RX ORDER — DOCUSATE SODIUM 100 MG
100 CAPSULE ORAL
Refills: 0 | Status: DISCONTINUED | OUTPATIENT
Start: 2019-05-14 | End: 2019-05-14

## 2019-05-14 RX ORDER — POTASSIUM PHOSPHATE, MONOBASIC POTASSIUM PHOSPHATE, DIBASIC 236; 224 MG/ML; MG/ML
30 INJECTION, SOLUTION INTRAVENOUS ONCE
Refills: 0 | Status: COMPLETED | OUTPATIENT
Start: 2019-05-14 | End: 2019-05-14

## 2019-05-14 RX ORDER — LIDOCAINE HCL 20 MG/ML
5 VIAL (ML) INJECTION ONCE
Refills: 0 | Status: COMPLETED | OUTPATIENT
Start: 2019-05-14 | End: 2019-05-14

## 2019-05-14 RX ORDER — SENNA PLUS 8.6 MG/1
2 TABLET ORAL AT BEDTIME
Refills: 0 | Status: DISCONTINUED | OUTPATIENT
Start: 2019-05-14 | End: 2019-05-28

## 2019-05-14 RX ORDER — INSULIN GLARGINE 100 [IU]/ML
8 INJECTION, SOLUTION SUBCUTANEOUS AT BEDTIME
Refills: 0 | Status: DISCONTINUED | OUTPATIENT
Start: 2019-05-14 | End: 2019-05-15

## 2019-05-14 RX ORDER — INSULIN HUMAN 100 [IU]/ML
10 INJECTION, SOLUTION SUBCUTANEOUS ONCE
Refills: 0 | Status: COMPLETED | OUTPATIENT
Start: 2019-05-14 | End: 2019-05-14

## 2019-05-14 RX ORDER — INSULIN HUMAN 100 [IU]/ML
INJECTION, SOLUTION SUBCUTANEOUS EVERY 6 HOURS
Refills: 0 | Status: DISCONTINUED | OUTPATIENT
Start: 2019-05-14 | End: 2019-06-19

## 2019-05-14 RX ORDER — INSULIN HUMAN 100 [IU]/ML
2 INJECTION, SOLUTION SUBCUTANEOUS EVERY 6 HOURS
Refills: 0 | Status: DISCONTINUED | OUTPATIENT
Start: 2019-05-14 | End: 2019-05-15

## 2019-05-14 RX ORDER — INSULIN LISPRO 100/ML
3 VIAL (ML) SUBCUTANEOUS EVERY 6 HOURS
Refills: 0 | Status: DISCONTINUED | OUTPATIENT
Start: 2019-05-14 | End: 2019-05-14

## 2019-05-14 RX ORDER — CALCIUM GLUCONATE 100 MG/ML
1 VIAL (ML) INTRAVENOUS ONCE
Refills: 0 | Status: COMPLETED | OUTPATIENT
Start: 2019-05-15 | End: 2019-05-15

## 2019-05-14 RX ORDER — ALBUMIN HUMAN 25 %
2750 VIAL (ML) INTRAVENOUS ONCE
Refills: 0 | Status: DISCONTINUED | OUTPATIENT
Start: 2019-05-15 | End: 2019-05-17

## 2019-05-14 RX ORDER — TAMOXIFEN CITRATE 20 MG/1
20 TABLET, FILM COATED ORAL
Refills: 0 | Status: DISCONTINUED | OUTPATIENT
Start: 2019-05-14 | End: 2019-05-17

## 2019-05-14 RX ADMIN — Medication 50 MILLILITER(S): at 13:52

## 2019-05-14 RX ADMIN — INSULIN HUMAN 2 UNIT(S): 100 INJECTION, SOLUTION SUBCUTANEOUS at 23:11

## 2019-05-14 RX ADMIN — CHLORHEXIDINE GLUCONATE 1 APPLICATION(S): 213 SOLUTION TOPICAL at 06:31

## 2019-05-14 RX ADMIN — SENNA PLUS 2 TABLET(S): 8.6 TABLET ORAL at 23:08

## 2019-05-14 RX ADMIN — Medication 62.5 MILLIMOLE(S): at 17:00

## 2019-05-14 RX ADMIN — TAMOXIFEN CITRATE 20 MILLIGRAM(S): 20 TABLET, FILM COATED ORAL at 15:29

## 2019-05-14 RX ADMIN — ZINC OXIDE 1 APPLICATION(S): 200 OINTMENT TOPICAL at 23:27

## 2019-05-14 RX ADMIN — INSULIN HUMAN 4: 100 INJECTION, SOLUTION SUBCUTANEOUS at 23:11

## 2019-05-14 RX ADMIN — Medication 5 MILLILITER(S): at 11:15

## 2019-05-14 RX ADMIN — INSULIN HUMAN 2 UNIT(S): 100 INJECTION, SOLUTION SUBCUTANEOUS at 18:10

## 2019-05-14 RX ADMIN — Medication 4: at 06:47

## 2019-05-14 RX ADMIN — INSULIN GLARGINE 8 UNIT(S): 100 INJECTION, SOLUTION SUBCUTANEOUS at 23:09

## 2019-05-14 RX ADMIN — INSULIN HUMAN 10 UNIT(S): 100 INJECTION, SOLUTION SUBCUTANEOUS at 13:52

## 2019-05-14 RX ADMIN — NYSTATIN CREAM 1 APPLICATION(S): 100000 CREAM TOPICAL at 06:29

## 2019-05-14 RX ADMIN — Medication 40 MILLIEQUIVALENT(S): at 10:28

## 2019-05-14 RX ADMIN — NYSTATIN CREAM 1 APPLICATION(S): 100000 CREAM TOPICAL at 14:17

## 2019-05-14 RX ADMIN — INSULIN HUMAN 2 UNIT(S): 100 INJECTION, SOLUTION SUBCUTANEOUS at 12:42

## 2019-05-14 RX ADMIN — Medication 40 MILLIEQUIVALENT(S): at 06:28

## 2019-05-14 RX ADMIN — PANTOPRAZOLE SODIUM 40 MILLIGRAM(S): 20 TABLET, DELAYED RELEASE ORAL at 12:42

## 2019-05-14 RX ADMIN — NYSTATIN CREAM 1 APPLICATION(S): 100000 CREAM TOPICAL at 23:26

## 2019-05-14 RX ADMIN — Medication 200 GRAM(S): at 13:42

## 2019-05-14 RX ADMIN — Medication 1 APPLICATION(S): at 23:21

## 2019-05-14 RX ADMIN — ERTAPENEM SODIUM 120 MILLIGRAM(S): 1 INJECTION, POWDER, LYOPHILIZED, FOR SOLUTION INTRAMUSCULAR; INTRAVENOUS at 22:08

## 2019-05-14 RX ADMIN — ZINC OXIDE 1 APPLICATION(S): 200 OINTMENT TOPICAL at 14:17

## 2019-05-14 RX ADMIN — Medication 1 APPLICATION(S): at 14:15

## 2019-05-14 RX ADMIN — Medication 5.47 MICROGRAM(S)/KG/MIN: at 13:58

## 2019-05-14 RX ADMIN — Medication 40 MILLIEQUIVALENT(S): at 02:57

## 2019-05-14 RX ADMIN — SCOPALAMINE 1 PATCH: 1 PATCH, EXTENDED RELEASE TRANSDERMAL at 07:05

## 2019-05-14 RX ADMIN — POTASSIUM PHOSPHATE, MONOBASIC POTASSIUM PHOSPHATE, DIBASIC 83.33 MILLIMOLE(S): 236; 224 INJECTION, SOLUTION INTRAVENOUS at 04:01

## 2019-05-14 RX ADMIN — INSULIN HUMAN 4: 100 INJECTION, SOLUTION SUBCUTANEOUS at 18:10

## 2019-05-14 RX ADMIN — ENOXAPARIN SODIUM 40 MILLIGRAM(S): 100 INJECTION SUBCUTANEOUS at 23:09

## 2019-05-14 RX ADMIN — ZINC OXIDE 1 APPLICATION(S): 200 OINTMENT TOPICAL at 06:30

## 2019-05-14 RX ADMIN — Medication 100 MILLIGRAM(S): at 18:50

## 2019-05-14 RX ADMIN — Medication 1 APPLICATION(S): at 06:43

## 2019-05-14 RX ADMIN — INSULIN HUMAN 4: 100 INJECTION, SOLUTION SUBCUTANEOUS at 12:42

## 2019-05-14 NOTE — PROGRESS NOTE ADULT - SUBJECTIVE AND OBJECTIVE BOX
Incomplete note    INTERVAL HPI/OVERNIGHT EVENTS:    SUBJECTIVE: Patient seen and examined at bedside.     ROS:  CV: Denies chest pain  Resp: Denies SOB  GI: Denies abdominal pain, constipation, diarrhea, nausea, vomiting  : Denies dysuria  ID: Denies fevers, chills  MSK: Denies joint pain     OBJECTIVE:    VITAL SIGNS:  ICU Vital Signs Last 24 Hrs  T(C): 36.8 (14 May 2019 05:21), Max: 38.6 (13 May 2019 18:30)  T(F): 98.2 (14 May 2019 05:21), Max: 101.4 (13 May 2019 18:30)  HR: 66 (14 May 2019 05:00) (62 - 118)  BP: 116/59 (14 May 2019 04:00) (70/58 - 170/89)  BP(mean): 89 (14 May 2019 04:00) (59 - 120)  ABP: --  ABP(mean): --  RR: 28 (14 May 2019 05:00) (11 - 51)  SpO2: 100% (14 May 2019 05:00) (96% - 100%)    Mode: AC/ CMV (Assist Control/ Continuous Mandatory Ventilation), RR (machine): 12, TV (machine): 320, FiO2: 50, PEEP: 8, ITime: 1, MAP: 11, PIP: 21     @ 07:13 @ 07:00  --------------------------------------------------------  IN: 1050 mL / OUT: 1290 mL / NET: -240 mL     @ 07:14 @ 06:15  --------------------------------------------------------  IN: 2395.2 mL / OUT: 1002 mL / NET: 1393.2 mL      CAPILLARY BLOOD GLUCOSE      POCT Blood Glucose.: 272 mg/dL (13 May 2019 23:08)      PHYSICAL EXAM:  General: NAD, comfortable  HEENT: NCAT, PERRL, clear conjunctiva, no scleral icterus  Neck: supple, no JVD  Respiratory: CTA b/l, no wheezing, rhonchi, rales  Cardiovascular: RRR, normal S1S2, no M/R/G  Vascular: 2+ radial and DP pulses  Abdomen: soft, NT/ND, bowel sounds in all four quadrants, no palpable masses  Extremities: WWP, no clubbing, cyanosis, or edema  Skin: No rashes present  Neuro:     MEDICATIONS:  MEDICATIONS  (STANDING):  chlorhexidine 2% Cloths 1 Application(s) Topical <User Schedule>  dextrose 5%. 1000 milliLiter(s) (50 mL/Hr) IV Continuous <Continuous>  dextrose 50% Injectable 25 Gram(s) IV Push once  enoxaparin Injectable 40 milliGRAM(s) SubCutaneous every 24 hours  ertapenem  IVPB 1000 milliGRAM(s) IV Intermittent every 24 hours  heparin 1000 Unit(s) 1000 Unit(s) IV Push once  insulin lispro (HumaLOG) corrective regimen sliding scale   SubCutaneous every 6 hours  norepinephrine Infusion 0.05 MICROgram(s)/kG/Min (5.466 mL/Hr) IV Continuous <Continuous>  nystatin Powder 1 Application(s) Topical three times a day  pantoprazole   Suspension 40 milliGRAM(s) Oral daily  petrolatum Ophthalmic Ointment 1 Application(s) Both EYES three times a day  potassium chloride   Powder 40 milliEquivalent(s) Oral every 4 hours  propofol Infusion 0.5 MICROgram(s)/kG/Min (0.175 mL/Hr) IV Continuous <Continuous>  zinc oxide 20% Ointment 1 Application(s) Topical three times a day    MEDICATIONS  (PRN):  glucagon  Injectable 1 milliGRAM(s) IntraMuscular once PRN Glucose LESS THAN 70 milligrams/deciliter      ALLERGIES:  Allergies    No Known Allergies    Intolerances    IVIG PRODUCT IS PRIGIVEN OR GAMMUNEX (Unknown)      LABS:                        8.3    6.72  )-----------( 304      ( 13 May 2019 14:00 )             27.5     05-14    149<H>  |  103  |  23  ----------------------------<  264<H>  2.8<LL>   |  38<H>  |  0.40<L>    Ca    8.7      14 May 2019 01:38  Phos  0.5     -  Mg     2.4     05-    TPro  6.6  /  Alb  4.4  /  TBili  0.5  /  DBili  x   /  AST  13  /  ALT  6<L>  /  AlkPhos  35<L>  -14    PT/INR - ( 13 May 2019 14:00 )   PT: 12.2 sec;   INR: 1.08          PTT - ( 13 May 2019 14:00 )  PTT:30.4 sec  Urinalysis Basic - ( 13 May 2019 19:01 )    Color: Yellow / Appearance: SL Cloudy / S.020 / pH: x  Gluc: x / Ketone: NEGATIVE  / Bili: Negative / Urobili: 0.2 E.U./dL   Blood: x / Protein: 100 mg/dL / Nitrite: NEGATIVE   Leuk Esterase: Moderate / RBC: 5-10 /HPF / WBC Many /HPF   Sq Epi: x / Non Sq Epi: 5-10 /HPF / Bacteria: Present /HPF        RADIOLOGY & ADDITIONAL TESTS: Reviewed. INTERVAL HPI/OVERNIGHT EVENTS: Patient intubated yesterday for CO2 narcosis. HD cath placed yesterday evening and started plasmapheresis. Bigeminy on rhythm strip and EKG, lymike sent: K 2.8 and phos 0.5. Repleted w/ K 120 oral, K 8 IV, and phos 30 IV. Started lispro 3 units q6h.    SUBJECTIVE: Patient seen and examined at bedside. Patient on no sedation. States she is comfortable without sedation and she does not want to be sedated. Denies HA, CP, abdominal pain. Reiterated to entire team that she does not want a tracheostomy.    OBJECTIVE:    VITAL SIGNS:  ICU Vital Signs Last 24 Hrs  T(C): 36.8 (14 May 2019 05:21), Max: 38.6 (13 May 2019 18:30)  T(F): 98.2 (14 May 2019 05:21), Max: 101.4 (13 May 2019 18:30)  HR: 66 (14 May 2019 05:00) (62 - 118)  BP: 116/59 (14 May 2019 04:00) (70/58 - 170/89)  BP(mean): 89 (14 May 2019 04:00) (59 - 120)  ABP: --  ABP(mean): --  RR: 28 (14 May 2019 05:00) (11 - 51)  SpO2: 100% (14 May 2019 05:00) (96% - 100%)    Mode: AC/ CMV (Assist Control/ Continuous Mandatory Ventilation), RR (machine): 12, TV (machine): 320, FiO2: 50, PEEP: 8, ITime: 1, MAP: 11, PIP: 21     @ :13 @ 07:00  --------------------------------------------------------  IN: 1050 mL / OUT: 1290 mL / NET: -240 mL     @ 07:  -  14 @ 06:15  --------------------------------------------------------  IN: 2395.2 mL / OUT: 1002 mL / NET: 1393.2 mL      CAPILLARY BLOOD GLUCOSE      POCT Blood Glucose.: 272 mg/dL (13 May 2019 23:08)      PHYSICAL EXAM:  General: NAD, comfortable on ventilator, appears stronger and more alert than yesterday prior to intubation  HEENT: NCAT, PERRL, clear conjunctiva, no scleral icterus  Neck: supple, no JVD, R HD cath in place  Respiratory: good air entry bilaterally, able to take deep breaths on command, tolerated CPAP for about 1.5 hours prior to tiring out. Mild rhonchi bilaterally.  Cardiovascular: RRR, normal S1S2, no M/R/G  Vascular: 1+ radial and DP pulses  Abdomen: soft, NT/ND, bowel sounds present, L colostomy w pink stoma, no palpable masses  Extremities: cool to touch, no clubbing, cyanosis, or edema  Skin: No rashes present  Neuro: 1-2/5 strength in legs and feet, 4/5 strength in upper extremities.    MEDICATIONS:  MEDICATIONS  (STANDING):  chlorhexidine 2% Cloths 1 Application(s) Topical <User Schedule>  dextrose 5%. 1000 milliLiter(s) (50 mL/Hr) IV Continuous <Continuous>  dextrose 50% Injectable 25 Gram(s) IV Push once  enoxaparin Injectable 40 milliGRAM(s) SubCutaneous every 24 hours  ertapenem  IVPB 1000 milliGRAM(s) IV Intermittent every 24 hours  heparin 1000 Unit(s) 1000 Unit(s) IV Push once  insulin lispro (HumaLOG) corrective regimen sliding scale   SubCutaneous every 6 hours  norepinephrine Infusion 0.05 MICROgram(s)/kG/Min (5.466 mL/Hr) IV Continuous <Continuous>  nystatin Powder 1 Application(s) Topical three times a day  pantoprazole   Suspension 40 milliGRAM(s) Oral daily  petrolatum Ophthalmic Ointment 1 Application(s) Both EYES three times a day  potassium chloride   Powder 40 milliEquivalent(s) Oral every 4 hours  propofol Infusion 0.5 MICROgram(s)/kG/Min (0.175 mL/Hr) IV Continuous <Continuous>  zinc oxide 20% Ointment 1 Application(s) Topical three times a day    MEDICATIONS  (PRN):  glucagon  Injectable 1 milliGRAM(s) IntraMuscular once PRN Glucose LESS THAN 70 milligrams/deciliter      ALLERGIES:  Allergies    No Known Allergies    Intolerances    IVIG PRODUCT IS PRIGIVEN OR GAMMUNEX (Unknown)      LABS:                        8.3    6.72  )-----------( 304      ( 13 May 2019 14:00 )             27.5     05-14    149<H>  |  103  |  23  ----------------------------<  264<H>  2.8<LL>   |  38<H>  |  0.40<L>    Ca    8.7      14 May 2019 01:38  Phos  0.5     -  Mg     2.4         TPro  6.6  /  Alb  4.4  /  TBili  0.5  /  DBili  x   /  AST  13  /  ALT  6<L>  /  AlkPhos  35<L>  -    PT/INR - ( 13 May 2019 14:00 )   PT: 12.2 sec;   INR: 1.08          PTT - ( 13 May 2019 14:00 )  PTT:30.4 sec  Urinalysis Basic - ( 13 May 2019 19:01 )    Color: Yellow / Appearance: SL Cloudy / S.020 / pH: x  Gluc: x / Ketone: NEGATIVE  / Bili: Negative / Urobili: 0.2 E.U./dL   Blood: x / Protein: 100 mg/dL / Nitrite: NEGATIVE   Leuk Esterase: Moderate / RBC: 5-10 /HPF / WBC Many /HPF   Sq Epi: x / Non Sq Epi: 5-10 /HPF / Bacteria: Present /HPF        RADIOLOGY & ADDITIONAL TESTS: Reviewed. Hospital Course   59 F w/ post-polio syndrome, Stage IV Endometrial Carcinoma s/p staging surgery 2018 and 1 cycle of chemo in 2019, s/p exlap, end colostomy, rectovaginal fistula w/ recurrent admissions UTIs/bacteremia, recently admitted for Proteus mirabilis ESBL septicemia in April on Meropenem, presented from [facility] in urosepsis, intubated in ED for airway protection and admitted to MICU. Patient was weaned off levophed and was extubated on  then transferred to GYN service. Surveillance blood cultures were negative. Both urine and initial blood cx growing ESBL e coli sensitive to meropenem and transitioned to ertapenem. Pt then transferred to [facility] for concern for worsening neuro exam, concern for airway protection started on one course of IVIG (Privigen) . in  am, patient intubated on 7Lac for hypercapneic respiratory failure and stepped up to MICU for CO2 narcosis 2/2 respiratory muscle insufficiency from AIDP. In the MICU, patient received 3 more doses of IVIG (total 4 doses) with good response. She was extubated on 5/10 and was stable until  when she was reintubated again for CO2 narcosis. Patient now undergoing tx w plasmapheresis. Patient now DNR and DNI and does not want a tracheostomy.    INTERVAL HPI/OVERNIGHT EVENTS: Patient intubated yesterday for CO2 narcosis. HD cath placed yesterday evening and started plasmapheresis. Bigeminy on rhythm strip and EKG, lytes sent: K 2.8 and phos 0.5. Repleted w/ K 120 oral, K 8 IV, and phos 30 IV. Started lispro 3 units q6h.    SUBJECTIVE: Patient seen and examined at bedside. Patient on no sedation. States she is comfortable without sedation and she does not want to be sedated. Denies HA, CP, abdominal pain. Reiterated to entire team that she does not want a tracheostomy.    OBJECTIVE:    VITAL SIGNS:  ICU Vital Signs Last 24 Hrs  T(C): 36.8 (14 May 2019 05:21), Max: 38.6 (13 May 2019 18:30)  T(F): 98.2 (14 May 2019 05:21), Max: 101.4 (13 May 2019 18:30)  HR: 66 (14 May 2019 05:00) (62 - 118)  BP: 116/59 (14 May 2019 04:00) (70/58 - 170/89)  BP(mean): 89 (14 May 2019 04:00) (59 - 120)  ABP: --  ABP(mean): --  RR: 28 (14 May 2019 05:00) (11 - 51)  SpO2: 100% (14 May 2019 05:00) (96% - 100%)    Mode: AC/ CMV (Assist Control/ Continuous Mandatory Ventilation), RR (machine): 12, TV (machine): 320, FiO2: 50, PEEP: 8, ITime: 1, MAP: 11, PIP: 21    05-12 @ 07:  -  13 @ 07:00  --------------------------------------------------------  IN: 1050 mL / OUT: 1290 mL / NET: -240 mL     @ 07:  -  -14 @ 06:15  --------------------------------------------------------  IN: 2395.2 mL / OUT: 1002 mL / NET: 1393.2 mL      CAPILLARY BLOOD GLUCOSE      POCT Blood Glucose.: 272 mg/dL (13 May 2019 23:08)      PHYSICAL EXAM:  General: NAD, comfortable on ventilator, appears stronger and more alert than yesterday prior to intubation  HEENT: NCAT, PERRL, clear conjunctiva, no scleral icterus  Neck: supple, no JVD, R HD cath in place  Respiratory: good air entry bilaterally, able to take deep breaths on command, tolerated CPAP for about 1.5 hours prior to tiring out. Mild rhonchi bilaterally.  Cardiovascular: RRR, normal S1S2, no M/R/G  Vascular: 1+ radial and DP pulses  Abdomen: soft, NT/ND, bowel sounds present, L colostomy w pink stoma, no palpable masses  Extremities: cool to touch, no clubbing, cyanosis, or edema  Skin: No rashes present  Neuro: 1-2/5 strength in legs and feet, 4/5 strength in upper extremities.    MEDICATIONS:  MEDICATIONS  (STANDING):  chlorhexidine 2% Cloths 1 Application(s) Topical <User Schedule>  dextrose 5%. 1000 milliLiter(s) (50 mL/Hr) IV Continuous <Continuous>  dextrose 50% Injectable 25 Gram(s) IV Push once  enoxaparin Injectable 40 milliGRAM(s) SubCutaneous every 24 hours  ertapenem  IVPB 1000 milliGRAM(s) IV Intermittent every 24 hours  heparin 1000 Unit(s) 1000 Unit(s) IV Push once  insulin lispro (HumaLOG) corrective regimen sliding scale   SubCutaneous every 6 hours  norepinephrine Infusion 0.05 MICROgram(s)/kG/Min (5.466 mL/Hr) IV Continuous <Continuous>  nystatin Powder 1 Application(s) Topical three times a day  pantoprazole   Suspension 40 milliGRAM(s) Oral daily  petrolatum Ophthalmic Ointment 1 Application(s) Both EYES three times a day  potassium chloride   Powder 40 milliEquivalent(s) Oral every 4 hours  propofol Infusion 0.5 MICROgram(s)/kG/Min (0.175 mL/Hr) IV Continuous <Continuous>  zinc oxide 20% Ointment 1 Application(s) Topical three times a day    MEDICATIONS  (PRN):  glucagon  Injectable 1 milliGRAM(s) IntraMuscular once PRN Glucose LESS THAN 70 milligrams/deciliter      ALLERGIES:  Allergies    No Known Allergies    Intolerances    IVIG PRODUCT IS PRIGIVEN OR GAMMUNEX (Unknown)      LABS:                        8.3    6.72  )-----------( 304      ( 13 May 2019 14:00 )             27.5     05-14    149<H>  |  103  |  23  ----------------------------<  264<H>  2.8<LL>   |  38<H>  |  0.40<L>    Ca    8.7      14 May 2019 01:38  Phos  0.5     05-14  Mg     2.4     -    TPro  6.6  /  Alb  4.4  /  TBili  0.5  /  DBili  x   /  AST  13  /  ALT  6<L>  /  AlkPhos  35<L>  -    PT/INR - ( 13 May 2019 14:00 )   PT: 12.2 sec;   INR: 1.08          PTT - ( 13 May 2019 14:00 )  PTT:30.4 sec  Urinalysis Basic - ( 13 May 2019 19:01 )    Color: Yellow / Appearance: SL Cloudy / S.020 / pH: x  Gluc: x / Ketone: NEGATIVE  / Bili: Negative / Urobili: 0.2 E.U./dL   Blood: x / Protein: 100 mg/dL / Nitrite: NEGATIVE   Leuk Esterase: Moderate / RBC: 5-10 /HPF / WBC Many /HPF   Sq Epi: x / Non Sq Epi: 5-10 /HPF / Bacteria: Present /HPF        RADIOLOGY & ADDITIONAL TESTS: Reviewed.

## 2019-05-14 NOTE — PROGRESS NOTE ADULT - ASSESSMENT
60 yo F PMHx polio, breast cancer, DM, Endometrial Cancer s/p exploratory laparotomy, enterolysis, SHAMIR, BSO, pelvic lymphadenectomy, low anterior resection mobilization of splenic flexure, end colostomy on 7/30/18, rectovaginal fistula, numerous admissions for urinary tract infection presented to [facility] in the setting of urosepsis. Hospital course complicated by blood stream infection (currently on ertapenem) and respiratory failure requiring intubation s/p extubation on 5/2. Intubated urgently on 7 lachman for CO2 narcosis in setting of AIDP and stepped up to MICU, now improving s/p IVIG and extubated to bipap on 5/10, now patient saturating well on NC    CARDIOVASCULAR  #Hemodynamics  Stable  - making appropriate amounts of urine, mentating well, warm extremities    PULMONARY  #Acute hypercapnic respiratory failure   Improving. 2/2 to demyelinating polyneuropathy (AIDP), patient extubated to bipap on 5/10, now on 4L NC, SpO2 %  - s/p 4 doses of IVIG  - per neurology, will need another round of 4 doses in two weeks  - increased upper airway secretions on 5/11 - s/p glycopyrrolate IV x 1  - glycopyrrolate 0.4mg IV PRN trouble handling secretions  - start scopolamine patch von 5/13  - pulmonary percussion tx   - OOB to chair      #Hx of urinary retention  - retaining on 5/8, so placed joseph  - might need SP catheter  - appreciate urology recs    Neurology  #AIDP  Improving. Patient w AIDP leading to respiratory compromise, previous EMG w demyelinating polyneuropathy  - recs per neurology  - s/p 4 rounds of IVIG, with dramatic improvement in strength and respiratory status  - f/u MRI brain w/wo contrast and MR cervical spine w/wo contrast when patient stable to go for imaging    INFECTIOUS DISEASE  #hx of recurrent ESBL ecoli UTI and bacteremia  - previously on admission patient admitted to MICU in septic shock 2/2 to ESBL ecoli bactermia and UTI  - continue ertepenem daily per ID recs through 5/14    HEME/ONC  #Stage IV endometrial adenocarcinoma  Per GYN-ONC w/ interval increase in tumor burden. Pt was treated with Anastrazole and initiated 3 weeks of megace 80mg BID followed by 3 weeks of tamoxifen. started megace and metformin 4/10, now off megace and metformin  - c/w Tamoxifen for 21 days (5/1/19- ), now w crushed version  - GYN-ONC on board, follow recs    FLUIDS/ELECTROLYTES/NUTRITION  -IVF: none  -Monitor, Replete to K>4 and Mg>2  -Diet: PO diet as tolerated, patient currently with decreased appetite, will continue NGT feeds for now, will get speech and swallow eval for recs    PROPHYLAXIS  -DVT: Lovenox and SCDs  -GI: protonix 40mg PO qd    DISPO: MICU    CODE STATUS: full code; palliative following regarding GOC 58 yo F PMHx polio, breast cancer, DM, Endometrial Cancer s/p exploratory laparotomy, enterolysis, SHAMIR, BSO, pelvic lymphadenectomy, low anterior resection mobilization of splenic flexure, end colostomy on 7/30/18, rectovaginal fistula, numerous admissions for urinary tract infection presented to [facility] in the setting of urosepsis. Hospital course complicated by blood stream infection (currently on ertapenem) and respiratory failure requiring intubation s/p extubation on 5/2. Intubated urgently on 7 lachman for CO2 narcosis in setting of AIDP and stepped up to MICU, initially improving s/p IVIG and extubated to bipap on 5/10, reintubated for CO2 narcosis on 5/14 and started on plasmapheresis     CARDIOVASCULAR  #Hemodynamics  Stable  - making appropriate amounts of urine, mentating well, cool extremities today however    PULMONARY  #Acute hypercapnic respiratory failure   2/2 to demyelinating polyneuropathy (AIDP), patient extubated to bipap on 5/10, reintubated on 5/14 for CO2 narcosis  - s/p 4 doses of IVIG finished 5/11  - appreciate neurology recs  - apprecite transfusion medicine recs  - received first plasmapheresis on 5/13. Will receive every other day for 5 doses  - scopolamine patch placed on 5/13      #Hx of urinary retention  - retaining on 5/8, so placed joseph  - might need SP catheter  - appreciate urology recs    Neurology  #AIDP  Patient w AIDP leading to respiratory compromise, previous EMG w demyelinating polyneuropathy  - recs per neurology  - s/p 4 rounds of IVIG, with dramatic improvement in strength and respiratory status, however decompensated on 5/13 and reinubated  - f/u MRI brain w/wo contrast and MR cervical spine w/wo contrast when patient stable to go for imaging  - plasmapheresis as above under pulmonary    INFECTIOUS DISEASE  #hx of recurrent ESBL ecoli UTI and bacteremia  - previously on admission patient admitted to MICU in septic shock 2/2 to ESBL ecoli bactermia and UTI  - continue ertepenem daily per ID recs. initially supposed to end 5/14, but continuing in setting of fever on 5/13 pm (however possibly due to asp pneumonitis s/p intubation    HEME/ONC  #Stage IV endometrial adenocarcinoma  Per GYN-ONC w/ interval increase in tumor burden. Pt was treated with Anastrazole and initiated 3 weeks of megace 80mg BID followed by 3 weeks of tamoxifen. started megace and metformin 4/10, now off megace and metformin  - c/w Tamoxifen for 21 days (5/1/19- ), now w crushed version  - GYN-ONC on board, follow recs    FLUIDS/ELECTROLYTES/NUTRITION  -IVF: none  -Monitor, Replete to K>4 and Mg>2  -Diet: continue NGT feeds for now, will get speech and swallow eval for recs    PROPHYLAXIS  -DVT: Lovenox and SCDs  -GI: none as on feeds    DISPO: MICU    CODE STATUS: full code; palliative following

## 2019-05-14 NOTE — PROGRESS NOTE ADULT - PROBLEM SELECTOR PLAN 7
rectovaginal and enterovaginal fistulas developed secondary to problem #6 and staging surgery in Fall 2018  -  evaluated and discussed option of supra-pubic catheter that may decrease, but not eliminate her risk/propensity for recurrent urinary infections. Management in this respect has been deferred 2/2 her more acute problems as described above

## 2019-05-14 NOTE — PROGRESS NOTE ADULT - ASSESSMENT
58yo F HD15 with stage IV endometrial adenocarcinoma s/p staging surgery '18 and 1 round of chemotherapy 2/19 readmitted from [facility] with fever, previously admitted for management of symptomatic rectovaginal and enterovaginal fistulas. Presents on referral from [facility] with urosepsis. Rapid response called due to worsening respiratory status s/p intubation and MICU admission. She then was on regional GYN service however had respiratory weakness and was transferred to Medicine/tele HD9 until requiring intubation again HD10 and is now in MICU. 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 Current plan for AIDP is plasmapheresis q48hrs total 5 txs. She as re-intubated on 5/13 due to respiratory distress. She was made DNR/DNI today.   Management per MICU    1. Neuro: weakness secondary Guillain Jackson versus exacerbation of AIDP- now status post IVIG x4 day course. Neurology following-appreciate recs. Patient has likely failed IVIG treatment given current status. Now undergoing plasmapheresis next 5/15   CSF culture no organisms seen, MRI when stable, original plan per neurology will treat with IVIG x2 days every 2wks  2. Pulm: Re-intubated 5/13. Extubated 5/10-transitioned to BiPAP initially, then on nasal canula 4L.  Glycopyrrolate given to help decrease secretions.   3. Cardio: again on pressor support. Appropriate urine output   4. FEN/GI: Dobhoff in situ, on tube feeds.   - Hypokalemic to 2.8 then hyperkalemic to 7.1- calcium gluconate given   - Hypophosphotemia-repleted  5. : Adequate urine output, joseph in place.  - Joseph replaced 5/8 after finding of overflow incontinence, f/u urology recs regarding suprapubic catheter   - rec UA, Ucx 5/13 due to turbid urine, UA neg nitrites, cx sent  6. ID: IV ertapenem until 5/14, s/p meropenem, s/p vanc, appreciate ID recs. febrile 5/13.  7. Endocrine: FS, ISS, Lantus 8u qhs started today, Metformin 1000mg BID held at this time   8. VTE prophylaxis - SCDs, Lovenox 40mg daily   9. GYN malignancy  -Stopped anastrazole and initiated 3 weeks of megace 80mg BID followed by 3 weeks of tamoxifen.   - Started megace and metformin 4/10, now on Tamoxifen for 21 days (5/1/19-  Compound version given.   -  Avoid immunotherapy at this time given associated risk of autoimmune disease   10. Derm: monitor sacrum for s/s of pressure ulcer, now stage 2  11. PT/OT needs evaluation, OOB with nursing staff  12. Social work/palliative: DNR/DNI

## 2019-05-14 NOTE — PROGRESS NOTE ADULT - SUBJECTIVE AND OBJECTIVE BOX
Pt seen and examined at bedside. No acute complaints.   Pt denies fever, chills, chest pain, SOB, nausea, vomiting, lightheadedness, or dizziness.      T(F): 98.6 (19 @ 10:35), Max: 101.4 (19 @ 18:30)  HR: 68 (19 @ 14:00) (62 - 118)  BP: 106/55 (19 @ 12:30) (70/58 - 170/89)  RR: 30 (19 @ 14:00) (11 - 51)  SpO2: 100% (19 @ 14:00) (96% - 100%)  Wt(kg): --  I&O's Summary    13 May 2019 07:  -  14 May 2019 07:00  --------------------------------------------------------  IN: 2650.2 mL / OUT: 1202 mL / NET: 1448.2 mL    14 May 2019 07:01  -  14 May 2019 14:23  --------------------------------------------------------  IN: 1111 mL / OUT: 875 mL / NET: 236 mL    MEDICATIONS  (STANDING):  chlorhexidine 2% Cloths 1 Application(s) Topical <User Schedule>  dextrose 5%. 1000 milliLiter(s) (50 mL/Hr) IV Continuous <Continuous>  dextrose 50% Injectable 25 Gram(s) IV Push once  docusate sodium 100 milliGRAM(s) Oral two times a day  enoxaparin Injectable 40 milliGRAM(s) SubCutaneous every 24 hours  ertapenem  IVPB 1000 milliGRAM(s) IV Intermittent every 24 hours  heparin 1000 Unit(s) 1000 Unit(s) IV Push once  insulin glargine Injectable (LANTUS) 8 Unit(s) SubCutaneous at bedtime  insulin regular  human corrective regimen sliding scale   SubCutaneous every 6 hours  insulin regular  human recombinant 2 Unit(s) SubCutaneous every 6 hours  norepinephrine Infusion 0.05 MICROgram(s)/kG/Min (5.466 mL/Hr) IV Continuous <Continuous>  nystatin Powder 1 Application(s) Topical three times a day  pantoprazole   Suspension 40 milliGRAM(s) Oral daily  petrolatum Ophthalmic Ointment 1 Application(s) Both EYES three times a day  senna 2 Tablet(s) Oral at bedtime  sodium phosphate IVPB 15 milliMole(s) IV Intermittent once  tamoxifen 20 milliGRAM(s) Oral <User Schedule>  zinc oxide 20% Ointment 1 Application(s) Topical three times a day    MEDICATIONS  (PRN):  dextrose 40% Gel 15 Gram(s) Oral once PRN Blood Glucose LESS THAN 70 milliGRAM(s)/deciliter  glucagon  Injectable 1 milliGRAM(s) IntraMuscular once PRN Glucose LESS THAN 70 milligrams/deciliter    Physical Exam:  Gen: no acute distress, responsive  Pulm: intubated   GI: soft, nontender, nondistended, stoma- pink with air and stool in bag  Ext: SCDs in place, wnl, LE strength 2/5 in lower extremities, UE improved motor strength, currently in restraints    LABS:                        7.7    9.12  )-----------( 275      ( 14 May 2019 11:29 )             24.7     05-14    145  |  107  |  25<H>  ----------------------------<  255<H>  6.5<HH>   |  33<H>  |  0.38<L>    Ca    9.0      14 May 2019 12:41  Phos  1.4       Mg     2.1         TPro  6.6  /  Alb  4.4  /  TBili  0.5  /  DBili  x   /  AST  13  /  ALT  6<L>  /  AlkPhos  35<L>  -14    PT/INR - ( 14 May 2019 12:00 )   PT: 13.9 sec;   INR: 1.22          PTT - ( 14 May 2019 12:00 )  PTT:27.5 sec  Urinalysis Basic - ( 13 May 2019 19:01 )    Color: Yellow / Appearance: SL Cloudy / S.020 / pH: x  Gluc: x / Ketone: NEGATIVE  / Bili: Negative / Urobili: 0.2 E.U./dL   Blood: x / Protein: 100 mg/dL / Nitrite: NEGATIVE   Leuk Esterase: Moderate / RBC: 5-10 /HPF / WBC Many /HPF   Sq Epi: x / Non Sq Epi: 5-10 /HPF / Bacteria: Present /HPF        RADIOLOGY & ADDITIONAL TESTS:

## 2019-05-14 NOTE — PROGRESS NOTE ADULT - PROBLEM SELECTOR PLAN 3
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

## 2019-05-14 NOTE — PROGRESS NOTE ADULT - ASSESSMENT
58yo F PMHx polio, breast cancer, DM, Endometrial Cancer s/p exploratory laparotomy, enterolysis, SHAMIR, BSO, pelvic lymphadenectomy, low anterior resection mobilization of splenic flexure, end colostomy on 7/30/18, rectovaginal fistula, numerous admissions for urinary tract infection presented to [facility] in the setting of urosepsis. Hospital course complicated by blood stream infection (currently on ertapenem) and respiratory failure requiring intubation s/p extubation on 5/2. Neurology consulted for worsening lower extremity weakness. On prior admission EMG raises suspicion for CIDP-spectrum disorder. Per collateral information patient became noticeably weak mala in her hands for approx 1 week prior to being diagnosed with IDP in April, suggesting a more acute process. 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 s/p four days of IVIG 500mg/kg on 5/7 - 5/10. Now s/p 1x episode of plasma exchange and intubated.     Recommend  - s/p intubation  - s/p plasma exhange x1, please continue Q48h for 10x total  - f/u on MG antibodies: Acetylcholine Blocking AB, Acetylcholine Modulating AB, Acetylcholine Receptor Binding Antibody, MuSK Antibody Test  - Pending MR brain w and wo con & MR cervical spine w and wo con once stable   Neurology will follow

## 2019-05-14 NOTE — PROGRESS NOTE ADULT - ASSESSMENT
55yo F w/ known stage IV endometrial cancer, likely with progression of disease, complex fistulae, chronic indwelling [place] admitted with fever and urosepsis originally requiring MICU admission for septic shock and respiratory failure which improved; however course c/b progressive ascending weakness while on medical telemetry/SDU despite initiation of IVIG, which was further c/b acute hypercapnic respiratory failure requiring reintubation and readmission to MICU for further management. Had shown some improvement, but 5/13 again with hypercarbic respiratory failure requiring intubation.  Has started plasmapheresis with some improvement. Palliative following for symptom management, patient/family support, and goals of care.

## 2019-05-14 NOTE — PROGRESS NOTE ADULT - SUBJECTIVE AND OBJECTIVE BOX
PENDING    GYN Progress Note    Patient seen at bedside.  yesterday,     Denies CP, palpitations, SOB, fever, chills, nausea, vomiting.    Vital Signs Last 24 Hrs  T(C): 36.8 (14 May 2019 05:21), Max: 38.6 (13 May 2019 18:30)  T(F): 98.2 (14 May 2019 05:21), Max: 101.4 (13 May 2019 18:30)  HR: 68 (14 May 2019 07:00) (62 - 118)  BP: 111/57 (14 May 2019 07:00) (70/58 - 170/89)  BP(mean): 78 (14 May 2019 07:00) (59 - 120)  RR: 22 (14 May 2019 07:00) (11 - 51)  SpO2: 100% (14 May 2019 07:00) (96% - 100%)    Physical Exam:  Gen: No Acute Distress  Pulm: Clear to auscultation bilaterally  GI: soft, appropriately nontender, nondistended, +BS, no rebound, no guarding.  Incision C/D/I  Ext: SCDs in place, [facility]    I&O's Summary    13 May 2019 07:  -  14 May 2019 07:00  --------------------------------------------------------  IN: 2650.2 mL / OUT: 1202 mL / NET: 1448.2 mL    14 May 2019 07:01  -  14 May 2019 07:53  --------------------------------------------------------  IN: 0 mL / OUT: 75 mL / NET: -75 mL      MEDICATIONS  (STANDING):  chlorhexidine 2% Cloths 1 Application(s) Topical <User Schedule>  dextrose 5%. 1000 milliLiter(s) (50 mL/Hr) IV Continuous <Continuous>  dextrose 50% Injectable 25 Gram(s) IV Push once  enoxaparin Injectable 40 milliGRAM(s) SubCutaneous every 24 hours  ertapenem  IVPB 1000 milliGRAM(s) IV Intermittent every 24 hours  heparin 1000 Unit(s) 1000 Unit(s) IV Push once  insulin lispro (HumaLOG) corrective regimen sliding scale   SubCutaneous every 6 hours  insulin regular  human recombinant 3 Unit(s) SubCutaneous every 6 hours  norepinephrine Infusion 0.05 MICROgram(s)/kG/Min (5.466 mL/Hr) IV Continuous <Continuous>  nystatin Powder 1 Application(s) Topical three times a day  pantoprazole   Suspension 40 milliGRAM(s) Oral daily  petrolatum Ophthalmic Ointment 1 Application(s) Both EYES three times a day  potassium chloride   Powder 40 milliEquivalent(s) Oral every 4 hours  propofol Infusion 0.5 MICROgram(s)/kG/Min (0.175 mL/Hr) IV Continuous <Continuous>  zinc oxide 20% Ointment 1 Application(s) Topical three times a day    MEDICATIONS  (PRN):  dextrose 40% Gel 15 Gram(s) Oral once PRN Blood Glucose LESS THAN 70 milliGRAM(s)/deciliter  glucagon  Injectable 1 milliGRAM(s) IntraMuscular once PRN Glucose LESS THAN 70 milligrams/deciliter      LABS:                        8.3    6.72  )-----------( 304      ( 13 May 2019 14:00 )             27.5     05-14    149<H>  |  103  |  23  ----------------------------<  264<H>  2.8<LL>   |  38<H>  |  0.40<L>    Ca    8.7      14 May 2019 01:38  Phos  0.5     -14  Mg     2.4     -14    TPro  6.6  /  Alb  4.4  /  TBili  0.5  /  DBili  x   /  AST  13  /  ALT  6<L>  /  AlkPhos  35<L>  05-14    PT/INR - ( 13 May 2019 14:00 )   PT: 12.2 sec;   INR: 1.08          PTT - ( 13 May 2019 14:00 )  PTT:30.4 sec  Urinalysis Basic - ( 13 May 2019 19:01 )    Color: Yellow / Appearance: SL Cloudy / S.020 / pH: x  Gluc: x / Ketone: NEGATIVE  / Bili: Negative / Urobili: 0.2 E.U./dL   Blood: x / Protein: 100 mg/dL / Nitrite: NEGATIVE   Leuk Esterase: Moderate / RBC: 5-10 /HPF / WBC Many /HPF   Sq Epi: x / Non Sq Epi: 5-10 /HPF / Bacteria: Present /HPF GYN Progress Note  PATIENT SEEN AT 6AM    Patient seen at bedside.  yesterday, she was re-intubated and also received HD port to receive plasmapheresis.   febrile - cultured.  intubated/ on pressors.  this AM, she is alert and oriented; responsive. no complaints.       /70 HR 68 T 98.2  Physical Exam:  Gen: no acute distress, responsive  Pulm: intubated   GI: soft, appropriately nontender, nondistended, stoma- pink with air and stool in bag  Ext: SCDs in place, wnl, LE strength 2/5 in lower extremities, UE improved motor strength, currently in restraints    I&O's Summary    13 May 2019 07:  -  14 May 2019 07:00  --------------------------------------------------------  IN: 2650.2 mL / OUT: 1202 mL / NET: 1448.2 mL    14 May 2019 07:  -  14 May 2019 07:53  --------------------------------------------------------  IN: 0 mL / OUT: 75 mL / NET: -75 mL      MEDICATIONS  (STANDING):  chlorhexidine 2% Cloths 1 Application(s) Topical <User Schedule>  dextrose 5%. 1000 milliLiter(s) (50 mL/Hr) IV Continuous <Continuous>  dextrose 50% Injectable 25 Gram(s) IV Push once  enoxaparin Injectable 40 milliGRAM(s) SubCutaneous every 24 hours  ertapenem  IVPB 1000 milliGRAM(s) IV Intermittent every 24 hours  heparin 1000 Unit(s) 1000 Unit(s) IV Push once  insulin lispro (HumaLOG) corrective regimen sliding scale   SubCutaneous every 6 hours  insulin regular  human recombinant 3 Unit(s) SubCutaneous every 6 hours  norepinephrine Infusion 0.05 MICROgram(s)/kG/Min (5.466 mL/Hr) IV Continuous <Continuous>  nystatin Powder 1 Application(s) Topical three times a day  pantoprazole   Suspension 40 milliGRAM(s) Oral daily  petrolatum Ophthalmic Ointment 1 Application(s) Both EYES three times a day  potassium chloride   Powder 40 milliEquivalent(s) Oral every 4 hours  propofol Infusion 0.5 MICROgram(s)/kG/Min (0.175 mL/Hr) IV Continuous <Continuous>  zinc oxide 20% Ointment 1 Application(s) Topical three times a day    MEDICATIONS  (PRN):  dextrose 40% Gel 15 Gram(s) Oral once PRN Blood Glucose LESS THAN 70 milliGRAM(s)/deciliter  glucagon  Injectable 1 milliGRAM(s) IntraMuscular once PRN Glucose LESS THAN 70 milligrams/deciliter      LABS:                        8.3    6.72  )-----------( 304      ( 13 May 2019 14:00 )             27.5     05-14    149<H>  |  103  |  23  ----------------------------<  264<H>  2.8<LL>   |  38<H>  |  0.40<L>    Ca    8.7      14 May 2019 01:38  Phos  0.5       Mg     2.4         TPro  6.6  /  Alb  4.4  /  TBili  0.5  /  DBili  x   /  AST  13  /  ALT  6<L>  /  AlkPhos  35<L>      PT/INR - ( 13 May 2019 14:00 )   PT: 12.2 sec;   INR: 1.08          PTT - ( 13 May 2019 14:00 )  PTT:30.4 sec  Urinalysis Basic - ( 13 May 2019 19:01 )    Color: Yellow / Appearance: SL Cloudy / S.020 / pH: x  Gluc: x / Ketone: NEGATIVE  / Bili: Negative / Urobili: 0.2 E.U./dL   Blood: x / Protein: 100 mg/dL / Nitrite: NEGATIVE   Leuk Esterase: Moderate / RBC: 5-10 /HPF / WBC Many /HPF   Sq Epi: x / Non Sq Epi: 5-10 /HPF / Bacteria: Present /HPF

## 2019-05-14 NOTE — PROGRESS NOTE ADULT - PROBLEM SELECTOR PLAN 8
Continues to have significant setbacks during her hospitalization. She was admittedly better on Friday with respect to her AIDP and was extubated; even in better spirits psychologically. However, as described above, her AIDP has relapsed after IVIG and required 3rd intubation for respiratory failure.     At this point patient is adamant she will not have trach and would not want reintubation once off the vent.  DNR/DNI order to be placed.  I discussed with patients sister and sister in law.  Their hope is that patient will not suffer, or suffocate.  We discussed that we would treat her so that does not happen if she develops respiratory failure again

## 2019-05-14 NOTE — PROGRESS NOTE ADULT - SUBJECTIVE AND OBJECTIVE BOX
The patient was seen and examined with the neurology team.  She remains intubated, but she is alert and responsive.       Allergies    No Known Allergies    Intolerances    IVIG PRODUCT IS PRIGIVEN OR GAMMUNEX (Unknown)      Medications:  MEDICATIONS  (STANDING):  chlorhexidine 2% Cloths 1 Application(s) Topical <User Schedule>  dextrose 5%. 1000 milliLiter(s) (50 mL/Hr) IV Continuous <Continuous>  dextrose 50% Injectable 25 Gram(s) IV Push once  enoxaparin Injectable 40 milliGRAM(s) SubCutaneous every 24 hours  ertapenem  IVPB 1000 milliGRAM(s) IV Intermittent every 24 hours  heparin 1000 Unit(s) 1000 Unit(s) IV Push once  insulin glargine Injectable (LANTUS) 8 Unit(s) SubCutaneous at bedtime  insulin regular  human corrective regimen sliding scale   SubCutaneous every 6 hours  insulin regular  human recombinant 2 Unit(s) SubCutaneous every 6 hours  norepinephrine Infusion 0.05 MICROgram(s)/kG/Min (5.466 mL/Hr) IV Continuous <Continuous>  nystatin Powder 1 Application(s) Topical three times a day  pantoprazole   Suspension 40 milliGRAM(s) Oral daily  petrolatum Ophthalmic Ointment 1 Application(s) Both EYES three times a day  potassium chloride   Powder 40 milliEquivalent(s) Oral every 4 hours  zinc oxide 20% Ointment 1 Application(s) Topical three times a day    MEDICATIONS  (PRN):  dextrose 40% Gel 15 Gram(s) Oral once PRN Blood Glucose LESS THAN 70 milliGRAM(s)/deciliter  glucagon  Injectable 1 milliGRAM(s) IntraMuscular once PRN Glucose LESS THAN 70 milligrams/deciliter    enoxaparin Injectable 40 milliGRAM(s) SubCutaneous every 24 hours        PHYSICAL EXAM:    T(F): 98.2 (19 @ 05:21), Max: 101.4 (19 @ 18:30)  HR: 76 (19 @ 10:00) (62 - 118)  BP: 123/53 (19 @ 09:00) (70/58 - 170/89)  RR: 22 (19 @ 10:00) (11 - 51)  SpO2: 100% (19 @ 10:00) (96% - 100%)  Wt(kg): --    Daily     Daily     Gen: well developed, well nourished, more comfortable today  HEENT: ET tube in place  Cardiovascular: RR, nl S1S2, no murmurs/rubs/gallops  Respiratory: clear air entry by anterior auscultation  Extremities: no clubbing/cyanosis, no edema, no calf tenderness; SCD in place  Skin: no rash on visible skin          Labs:                          8.3    6.72  )-----------( 304      ( 13 May 2019 14:00 )             27.5     CBC Full  -  ( 13 May 2019 14:00 )  WBC Count : 6.72 K/uL  RBC Count : 2.83 M/uL  Hemoglobin : 8.3 g/dL  Hematocrit : 27.5 %  Platelet Count - Automated : 304 K/uL  Mean Cell Volume : 97.2 fl  Mean Cell Hemoglobin : 29.3 pg  Mean Cell Hemoglobin Concentration : 30.2 gm/dL      PT/INR - ( 13 May 2019 14:00 )   PT: 12.2 sec;   INR: 1.08          PTT - ( 13 May 2019 14:00 )  PTT:30.4 sec        149<H>  |  103  |  23  ----------------------------<  264<H>  2.8<LL>   |  38<H>  |  0.40<L>    Ca    8.7      14 May 2019 01:38  Phos  0.5       Mg     2.4         TPro  6.6  /  Alb  4.4  /  TBili  0.5  /  DBili  x   /  AST  13  /  ALT  6<L>  /  AlkPhos  35<L>        Urinalysis Basic - ( 13 May 2019 19:01 )    Color: Yellow / Appearance: SL Cloudy / S.020 / pH: x  Gluc: x / Ketone: NEGATIVE  / Bili: Negative / Urobili: 0.2 E.U./dL   Blood: x / Protein: 100 mg/dL / Nitrite: NEGATIVE   Leuk Esterase: Moderate / RBC: 5-10 /HPF / WBC Many /HPF   Sq Epi: x / Non Sq Epi: 5-10 /HPF / Bacteria: Present /HPF

## 2019-05-14 NOTE — PROGRESS NOTE ADULT - SUBJECTIVE AND OBJECTIVE BOX
TIM MCDANIEL             [id number]    CC:  weakness, GO    HPI:  58 yo G0 w/ known stage IV endometrial carcinoma s/p staging surgery 7/2018 and 1 cycle of chemotherapy in 2/2019 followed by multiple admissions for management of symptomatic enterovaginal fistula, complex fistula associated urinary tract infection, bacteremia presents on referral from [facility] after fever today of 100.7. Pt reports poor PO intake, nausea w/ dry heaving and bringing up phlegm for the past 2 days. She also reports continued weakness. She denies chills, HA, dizziness, CP, palpitations, SOB, abdominal pain, vaginal bleeding, leakage of stool per vagina, abnormal vaginal discharge. She states she was not ambulating daily at [facility] however was working with PT there.    OB/GYN Hx: G0, stage 4 endometrial cancer dx in 7/2018; h/o breast cancer in 2009 s/p chemo and radiation with left breast lumpectomy  PMHx: T2DM, polio in childhood, h/o breast cancer  SHx: left breast lumpectomy, right knee surgery with screw placement 2001, 7/2018 exploratory laparotomy, enterolysis, SHAMIR, BSO, pelvic lymphadenectomy, low anterior resection mobilization of splenic flexure, end colostomy   Allergies: NKDA (29 Apr 2019 23:11)    SUBJECTIVE:  Patient reintubated yesterday secondary to profound weakness.  Became much more awake when hypercapnia corrected.  Underwent plasmapheresis last evening.  Today reports feeling stronger.  She was very clear on rounds this am talking to MICU team that she would not allow tracheostomy.  Plan now is to treat her and get her strong enough to come off the vent- but not to reintubate.  Spoke with sister and sister in law at length who understand and respect patient's wishes.    Patient admits she feels stronger in shoulders and arms.  Shows me that she has more had control.  Denies pain.  Team presently drawing blood gas which patient hates    ROS:  DYSPNEA: No  NAUS/VOM: No  SECRETIONS: Yes, some difficulty clearing secretions	  AGITATION: No  Pain (Y/N): No  -Provocation/Palliation:  -Quality/Quantity:  -Radiating:  -Severity:  -Timing/Frequency:  -Impact on ADLs:    OTHER REVIEW OF SYSTEMS: no other complaints      PEx:  Vital Signs Last 24 Hrs  T(C): 37 (14 May 2019 10:35), Max: 38.6 (13 May 2019 18:30)  T(F): 98.6 (14 May 2019 10:35), Max: 101.4 (13 May 2019 18:30)  HR: 74 (14 May 2019 14:30) (62 - 98)  BP: 110/54 (14 May 2019 14:30) (77/47 - 156/70)  BP(mean): 70 (14 May 2019 14:30) (59 - 106)  RR: 16 (14 May 2019 14:30) (11 - 51)  SpO2: 99% (14 May 2019 14:30) (97% - 100%)    General: frail female, chronically ill appearing  uncomfortable in bed; appearing older than stated age, pale  HEENT: moderate alopecia; conjunctival pallor; very hypophonic speech, moist mucous membranes with secretions ? ptosis vs weakness of lids orally intubated, NGT in place  Neck: supple, but very weak- cannot maintain posture  CVS: S1/S2, RRR  Resp: on ventilator  GI: soft, non-tender  Musc: hypotrophic muscularity and bulk, particularly of UE/hands, LE; profound kyphoscoliosis muscle wasting of left trapezius and latissimus dorsi  Neuro: awake and alert; shrugs shoulds moderate neck strength, squeezes my hand  Psych: depressed mood, euthymic affect; pleasant  Skin: intact	     ALLERGIES: IVIG PRODUCT IS PRIGIVEN OR GAMMUNEX (Unknown)  No Known Allergies    OPIATE NAÏVE (Y/N): Yes on presentation, had received opiates earlier in admission    MEDICATIONS: REVIEWED  MEDICATIONS  (STANDING):  chlorhexidine 2% Cloths 1 Application(s) Topical <User Schedule>  dextrose 5%. 1000 milliLiter(s) (50 mL/Hr) IV Continuous <Continuous>  dextrose 50% Injectable 25 Gram(s) IV Push once  docusate sodium 100 milliGRAM(s) Oral two times a day  enoxaparin Injectable 40 milliGRAM(s) SubCutaneous every 24 hours  ertapenem  IVPB 1000 milliGRAM(s) IV Intermittent every 24 hours  heparin 1000 Unit(s) 1000 Unit(s) IV Push once  insulin glargine Injectable (LANTUS) 8 Unit(s) SubCutaneous at bedtime  insulin regular  human corrective regimen sliding scale   SubCutaneous every 6 hours  insulin regular  human recombinant 2 Unit(s) SubCutaneous every 6 hours  norepinephrine Infusion 0.05 MICROgram(s)/kG/Min (5.466 mL/Hr) IV Continuous <Continuous>  nystatin Powder 1 Application(s) Topical three times a day  pantoprazole   Suspension 40 milliGRAM(s) Oral daily  petrolatum Ophthalmic Ointment 1 Application(s) Both EYES three times a day  senna 2 Tablet(s) Oral at bedtime  sodium phosphate IVPB 15 milliMole(s) IV Intermittent once  tamoxifen 20 milliGRAM(s) Oral <User Schedule>  zinc oxide 20% Ointment 1 Application(s) Topical three times a day    MEDICATIONS  (PRN):  dextrose 40% Gel 15 Gram(s) Oral once PRN Blood Glucose LESS THAN 70 milliGRAM(s)/deciliter  glucagon  Injectable 1 milliGRAM(s) IntraMuscular once PRN Glucose LESS THAN 70 milligrams/deciliter        LABS: REVIEWED                        7.7    9.12  )-----------( 275      ( 14 May 2019 11:29 )             24.7   05-14    145  |  107  |  25<H>  ----------------------------<  255<H>  6.5<HH>   |  33<H>  |  0.38<L>    Ca    9.0      14 May 2019 12:41  Phos  1.4     05-14  Mg     2.1     05-14    TPro  6.6  /  Alb  4.4  /  TBili  0.5  /  DBili  x   /  AST  13  /  ALT  6<L>  /  AlkPhos  35<L>  05-14                                  IMAGING: REVIEWED    ADVANCED DIRECTIVES:     see note below, no DNR/DNI per patient's wishes, not to be reintubated    DECISION MAKER: patient/self  LEGAL SURROGATE: Esha Avila (sister) [phone]    PSYCHOSOCIAL-SPIRITUAL ASSESSMENT:       Reviewed       Care plan as below	    GOALS OF CARE DISCUSSION       Palliative care info/counseling provided previously.  Awaiting arrival of Esha roblero	           Family meeting       Advanced Directives addressed please see Advance Care Planning Note	           See previous Palliative Medicine Notes       Documentation of GOC: Agrees with efforts to treat, but does not want trach and once off vent would refuse to be reintubated  	      AGENCY CHOICE DISCUSSED:     Too acute to discuss    REFERRALS	        Palliative Med        Unit SW/Case Mgmt        - declined       Speech/Swallow - failed dysphagia; has NGT currently       Patient/Family Support       Massage Therapy       Music Therapy       Hospice - not applicable at present       Nutrition       PT/OT

## 2019-05-14 NOTE — PROGRESS NOTE ADULT - PROBLEM SELECTOR PLAN 4
Per neurology, whether or not PPS is present should not be affecting her ascending/upper extremity weakness and respiratory insufficiency and is better explained by AIDP vs. CIDP  - mgmt otherwise as above in problem #2 and #3  - patient was disappointed that she had not noticed improvement in LE strength from IVIG

## 2019-05-14 NOTE — PROGRESS NOTE ADULT - ASSESSMENT
60yo F HD15 with stage IV endometrial adenocarcinoma s/p staging surgery '18 and 1 round of chemotherapy 2/19 readmitted from [facility] with fever, previously admitted for management of symptomatic rectovaginal and enterovaginal fistulas. Presents on referral from [facility] with urosepsis.  Rapid response called due to worsening respiratory status s/p intubation and MICU admission. She then was on regional GYN service however had respiratory weakness and was transferred to Medicine/tele HD9 until requiring intubation again HD10 and is now in MICU. 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 Was extubated on NC. Yesterday was re-intubated due to respiratory distress.  Management per MICU    1. Neuro: weakness secondary Guillain [place] versus exacerbation of AIDP- now status post IVIG x4 day course. Neurology following-appreciate recs. Patient has likely failed IVIG treatment given current status. Now undergoing plasmapheresis   CSF culture no organisms seen, MRI when stable, original plan per neurology will treat with IVIG x2 days every 2wks  2. Pulm: Extubated 5/10 - transitioned to BiPAP initially, then on nasal canula 4L.  Glycopyrrolate given to help decrease secretions which are improved. Pulmonary percussion Tx per medicine team. now re-intubated 5/13.  3. Cardio: again on pressor support. Appropriate urine output.   4. FEN/GI: Dobhoff in situ, on tube feeds.  - TF were paused sat evening given concern for possible aspiration then restarted  - Patient passed dysphagia testing 5/12 but then worsened 5/13  - Recommend daily labs with ongoing electrolytes repletions PRN  5. : Adequate urine output, joseph in place.  - Joseph replaced 5/8 after finding of overflow incontinence, f/u urology recs regarding suprapubic catheter   - rec UA, Ucx 5/13 due to turbid urine, UA neg nitrites, cx sent  6. ID: IV ertapenem until 5/14, s/p meropenem, s/p vanc, appreciate ID recs. febrile 5/13.  7. Endocrine: FS, ISS, Metformin 1000mg BID held at this time   8. VTE prophylaxis - SCDs, Lovenox 40mg daily   9. GYN malignancy  -Stopped anastrazole and initiated 3 weeks of megace 80mg BID followed by 3 weeks of tamoxifen.   - Started megace and metformin 4/10, now on Tamoxifen for 21 days (5/1/19-  was on pills, then "crushed" version, then pills, now on nothing until pharmacy can compound again. The total tx is 21 days. pt has skipped some days.  -  Avoid immunotherapy at this time given associated risk of autoimmune disease   10. Derm: monitor sacrum for s/s of pressure ulcer, now stage 2  11. PT/OT needs evaluation, OOB with nursing staff  12. Social work/palliative: palliative care following. dispo plan to acute rehab when stable   **code status to be addressed

## 2019-05-14 NOTE — PROGRESS NOTE ADULT - ATTENDING COMMENTS
I was physically present for the key portions of the evaluation and managemnent (E/M) service provided.  I agree with the above history, physical, and plan which I have reviewed and edited where appropriate, with the exceptions as per my note.    Neuro exam largely stable, pt awake and 4/5 in UEs, 2/5 in BL HF, and 0/5 distal LEs (chronic). Cont PLEX. will follow.

## 2019-05-14 NOTE — PROGRESS NOTE ADULT - PROBLEM SELECTOR PLAN 2
Initially improved on IVIG however weakness has relapsed in last 12-24hrs and significantly worse - similar to how she was last week prior to last intubation on step-down unit.  - s/p IVIG already  - appreciate neurology input and management recommendations; plasma exchange started  - mgmt otherwise as below in problem #4  - prognosis overall remains guarded given her multiple chronic comorbidities, the prospect of cytotoxic chemotherapy initiation for her malignancy per GYN, and now with the third need for intubation  - patient expresses frustration and disappointment her UE/upper body weakness returned  Clearer about advance directives

## 2019-05-14 NOTE — PROGRESS NOTE ADULT - SUBJECTIVE AND OBJECTIVE BOX
INTERVAL HPI/OVERNIGHT EVENTS:  Patient intubated but awake, alert and responsive. Received 1x episode of plasma exchange. Patient has numerous electrolyte abnormalities this AM.     CONSTITUTIONAL:  Negative fever or chills, feels well.  EYES:  Negative  blurry vision or double vision  CARDIOVASCULAR:  Negative for chest pain or palpitations  RESPIRATORY:  Negative for cough, wheezing, or SOB   GASTROINTESTINAL:  Negative for nausea, vomiting, diarrhea, constipation, or abdominal pain  GENITOURINARY:  Negative frequency, urgency or dysuria  NEUROLOGIC:  No headache, confusion, dizziness, lightheadedness      ANTIBIOTICS/RELEVANT:    MEDICATIONS  (STANDING):  chlorhexidine 2% Cloths 1 Application(s) Topical <User Schedule>  dextrose 5%. 1000 milliLiter(s) (50 mL/Hr) IV Continuous <Continuous>  dextrose 50% Injectable 25 Gram(s) IV Push once  enoxaparin Injectable 40 milliGRAM(s) SubCutaneous every 24 hours  ertapenem  IVPB 1000 milliGRAM(s) IV Intermittent every 24 hours  heparin 1000 Unit(s) 1000 Unit(s) IV Push once  insulin glargine Injectable (LANTUS) 8 Unit(s) SubCutaneous at bedtime  insulin regular  human corrective regimen sliding scale   SubCutaneous every 6 hours  insulin regular  human recombinant 2 Unit(s) SubCutaneous every 6 hours  lidocaine 1% Injectable 5 milliLiter(s) Local Injection once  norepinephrine Infusion 0.05 MICROgram(s)/kG/Min (5.466 mL/Hr) IV Continuous <Continuous>  nystatin Powder 1 Application(s) Topical three times a day  pantoprazole   Suspension 40 milliGRAM(s) Oral daily  petrolatum Ophthalmic Ointment 1 Application(s) Both EYES three times a day  tamoxifen 20 milliGRAM(s) Oral <User Schedule>  zinc oxide 20% Ointment 1 Application(s) Topical three times a day    MEDICATIONS  (PRN):  dextrose 40% Gel 15 Gram(s) Oral once PRN Blood Glucose LESS THAN 70 milliGRAM(s)/deciliter  glucagon  Injectable 1 milliGRAM(s) IntraMuscular once PRN Glucose LESS THAN 70 milligrams/deciliter        Vital Signs Last 24 Hrs  T(C): 37 (14 May 2019 10:35), Max: 38.6 (13 May 2019 18:30)  T(F): 98.6 (14 May 2019 10:35), Max: 101.4 (13 May 2019 18:30)  HR: 70 (14 May 2019 10:34) (62 - 118)  BP: 123/53 (14 May 2019 09:00) (70/58 - 170/89)  BP(mean): 81 (14 May 2019 09:00) (59 - 120)  RR: 22 (14 May 2019 10:00) (11 - 51)  SpO2: 100% (14 May 2019 10:34) (96% - 100%)    PHYSICAL EXAMINATION:  Constitutional: Frail middle age female, intubated, NG tube   Eyes: Conjunctiva and sclera clear  Neurologic:  - Mental Status:  Awake and alert, able to follow commands as directed  - Cranial Nerves II-XII:    II: Pupils are equal, round, and reactive to light.  III, IV, VI:  EOMI  V:  Facial sensation is intact  VII:  Strong eye closure   VIII:  Hearing is intact to finger rub.  XI:  Weak shoulder shrug, able to move head  - Motor: Hypotrophic muscles. Upper extremities 4-/5 Lower extremities Leg 1/5 bilaterally, Ankle 0/5  - Reflexes:  0/5 reflexes at biceps, triceps, brachioradialis, knees, and ankles. No babinski response.   - Sensory:  Intact to light touch  - Gait: Deferred     LABS:                        7.7    9.12  )-----------( 275      ( 14 May 2019 11:29 )             24.7     05-14    149<H>  |  103  |  23  ----------------------------<  264<H>  2.8<LL>   |  38<H>  |  0.40<L>    Ca    8.7      14 May 2019 01:38  Phos  0.5     -  Mg     2.4     -14    TPro  6.6  /  Alb  4.4  /  TBili  0.5  /  DBili  x   /  AST  13  /  ALT  6<L>  /  AlkPhos  35<L>  05-14    PT/INR - ( 13 May 2019 14:00 )   PT: 12.2 sec;   INR: 1.08          PTT - ( 13 May 2019 14:00 )  PTT:30.4 sec  Urinalysis Basic - ( 13 May 2019 19:01 )    Color: Yellow / Appearance: SL Cloudy / S.020 / pH: x  Gluc: x / Ketone: NEGATIVE  / Bili: Negative / Urobili: 0.2 E.U./dL   Blood: x / Protein: 100 mg/dL / Nitrite: NEGATIVE   Leuk Esterase: Moderate / RBC: 5-10 /HPF / WBC Many /HPF   Sq Epi: x / Non Sq Epi: 5-10 /HPF / Bacteria: Present /HPF        MICROBIOLOGY:    Culture - Sputum (collected 13 May 2019 19:59)  Source: .Sputum Sputum  Gram Stain (14 May 2019 11:02):    No epithelial cells    Numerous White blood cells    Rare Gram Positive Cocci in Clusters    Rare Gram Negative Rods  Preliminary Report (14 May 2019 11:12):    Normal Respiratory Ghazal present to date    Culture - Blood (collected 13 May 2019 19:06)  Source: .Blood Blood  Preliminary Report (14 May 2019 08:00):    No growth at 12 hours    Culture - Blood (collected 13 May 2019 19:06)  Source: .Blood Blood  Preliminary Report (14 May 2019 08:00):    No growth at 12 hours      RADIOLOGY & ADDITIONAL STUDIES:

## 2019-05-14 NOTE — PROGRESS NOTE ADULT - ASSESSMENT
Patient has shown some improvement since initial treatment with TPE. Difficult to know if this is just a response to time or to the TPE. Plan is for continued every other day TPE for 5 treatments. Note: the electrolyte abnormalities are not typical for TPE and should be rechecked and addressed. Please follow CBC, PT, PTT and fibrinogen, and calcium - as plasmapheresis may affect these lab values.

## 2019-05-15 ENCOUNTER — APPOINTMENT (OUTPATIENT)
Dept: NEUROLOGY | Facility: CLINIC | Age: 59
End: 2019-05-15

## 2019-05-15 LAB
ACHR BLOCK AB SER-ACNC: <15 — SIGNIFICANT CHANGE UP
ACRM MODULATING ANTIBODY: 0 NMOL/L — SIGNIFICANT CHANGE UP
ALBUMIN SERPL ELPH-MCNC: 4.5 G/DL — SIGNIFICANT CHANGE UP (ref 3.3–5)
ALP SERPL-CCNC: 23 U/L — LOW (ref 40–120)
ALT FLD-CCNC: <5 U/L — LOW (ref 10–45)
AMPA-R AB CBA, CSF: NEGATIVE — SIGNIFICANT CHANGE UP
AMPHIPHYSIN AB TITR CSF: NEGATIVE TITER — SIGNIFICANT CHANGE UP
ANION GAP SERPL CALC-SCNC: 8 MMOL/L — SIGNIFICANT CHANGE UP (ref 5–17)
ANION GAP SERPL CALC-SCNC: 8 MMOL/L — SIGNIFICANT CHANGE UP (ref 5–17)
ANION GAP SERPL CALC-SCNC: 9 MMOL/L — SIGNIFICANT CHANGE UP (ref 5–17)
APTT BLD: 30 SEC — SIGNIFICANT CHANGE UP (ref 27.5–36.3)
AST SERPL-CCNC: SIGNIFICANT CHANGE UP (ref 10–40)
BILIRUB SERPL-MCNC: 0.6 MG/DL — SIGNIFICANT CHANGE UP (ref 0.2–1.2)
BUN SERPL-MCNC: 14 MG/DL — SIGNIFICANT CHANGE UP (ref 7–23)
BUN SERPL-MCNC: 16 MG/DL — SIGNIFICANT CHANGE UP (ref 7–23)
BUN SERPL-MCNC: 18 MG/DL — SIGNIFICANT CHANGE UP (ref 7–23)
CALCIUM SERPL-MCNC: 9 MG/DL — SIGNIFICANT CHANGE UP (ref 8.4–10.5)
CALCIUM SERPL-MCNC: 9 MG/DL — SIGNIFICANT CHANGE UP (ref 8.4–10.5)
CALCIUM SERPL-MCNC: 9.6 MG/DL — SIGNIFICANT CHANGE UP (ref 8.4–10.5)
CASPR2-IGG CBA, CSF: NEGATIVE — SIGNIFICANT CHANGE UP
CHLORIDE SERPL-SCNC: 100 MMOL/L — SIGNIFICANT CHANGE UP (ref 96–108)
CHLORIDE SERPL-SCNC: 98 MMOL/L — SIGNIFICANT CHANGE UP (ref 96–108)
CHLORIDE SERPL-SCNC: 99 MMOL/L — SIGNIFICANT CHANGE UP (ref 96–108)
CO2 SERPL-SCNC: 32 MMOL/L — HIGH (ref 22–31)
CO2 SERPL-SCNC: 34 MMOL/L — HIGH (ref 22–31)
CO2 SERPL-SCNC: 36 MMOL/L — HIGH (ref 22–31)
CREAT SERPL-MCNC: 0.36 MG/DL — LOW (ref 0.5–1.3)
CREAT SERPL-MCNC: 0.36 MG/DL — LOW (ref 0.5–1.3)
CREAT SERPL-MCNC: 0.4 MG/DL — LOW (ref 0.5–1.3)
CULTURE RESULTS: SIGNIFICANT CHANGE UP
CV2 IGG TITR CSF: NEGATIVE TITER — SIGNIFICANT CHANGE UP
FIBRINOGEN PPP-MCNC: 397 MG/DL — SIGNIFICANT CHANGE UP (ref 258–438)
GABA-B-R AB CBA, CSF: NEGATIVE — SIGNIFICANT CHANGE UP
GAD65 AB CSF-SCNC: 0 NMOL/L — SIGNIFICANT CHANGE UP
GLIAL NUC TYPE 1 AB TITR CSF: NEGATIVE TITER — SIGNIFICANT CHANGE UP
GLUCOSE BLDC GLUCOMTR-MCNC: 166 MG/DL — HIGH (ref 70–99)
GLUCOSE BLDC GLUCOMTR-MCNC: 191 MG/DL — HIGH (ref 70–99)
GLUCOSE BLDC GLUCOMTR-MCNC: 193 MG/DL — HIGH (ref 70–99)
GLUCOSE BLDC GLUCOMTR-MCNC: 203 MG/DL — HIGH (ref 70–99)
GLUCOSE SERPL-MCNC: 172 MG/DL — HIGH (ref 70–99)
GLUCOSE SERPL-MCNC: 184 MG/DL — HIGH (ref 70–99)
GLUCOSE SERPL-MCNC: 202 MG/DL — HIGH (ref 70–99)
HCT VFR BLD CALC: 22.3 % — LOW (ref 34.5–45)
HCT VFR BLD CALC: 23.9 % — LOW (ref 34.5–45)
HGB BLD-MCNC: 7.1 G/DL — LOW (ref 11.5–15.5)
HGB BLD-MCNC: 7.5 G/DL — LOW (ref 11.5–15.5)
HU1 AB TITR CSF IF: NEGATIVE TITER — SIGNIFICANT CHANGE UP
HU2 AB TITR CSF IF: NEGATIVE TITER — SIGNIFICANT CHANGE UP
HU3 AB TITR CSF: NEGATIVE TITER — SIGNIFICANT CHANGE UP
IMMUNOLOGIST REVIEW: SIGNIFICANT CHANGE UP
INR BLD: 1.13 — SIGNIFICANT CHANGE UP (ref 0.88–1.16)
LGI1-IGG CBA, CSF: NEGATIVE — SIGNIFICANT CHANGE UP
MAGNESIUM SERPL-MCNC: 1.8 MG/DL — SIGNIFICANT CHANGE UP (ref 1.6–2.6)
MCHC RBC-ENTMCNC: 29 PG — SIGNIFICANT CHANGE UP (ref 27–34)
MCHC RBC-ENTMCNC: 29.5 PG — SIGNIFICANT CHANGE UP (ref 27–34)
MCHC RBC-ENTMCNC: 31.4 GM/DL — LOW (ref 32–36)
MCHC RBC-ENTMCNC: 31.8 GM/DL — LOW (ref 32–36)
MCV RBC AUTO: 92.3 FL — SIGNIFICANT CHANGE UP (ref 80–100)
MCV RBC AUTO: 92.5 FL — SIGNIFICANT CHANGE UP (ref 80–100)
MUSK IGG SER IA-MCNC: 0 NMOL/L — SIGNIFICANT CHANGE UP (ref 0–0.02)
NMDA-R AB CBA, CSF: NEGATIVE — SIGNIFICANT CHANGE UP
NRBC # BLD: 0 /100 WBCS — SIGNIFICANT CHANGE UP (ref 0–0)
NRBC # BLD: 0 /100 WBCS — SIGNIFICANT CHANGE UP (ref 0–0)
PCA-TR AB TITR CSF: NEGATIVE TITER — SIGNIFICANT CHANGE UP
PHOSPHATE SERPL-MCNC: 3.3 MG/DL — SIGNIFICANT CHANGE UP (ref 2.5–4.5)
PLATELET # BLD AUTO: 157 K/UL — SIGNIFICANT CHANGE UP (ref 150–400)
PLATELET # BLD AUTO: 253 K/UL — SIGNIFICANT CHANGE UP (ref 150–400)
POTASSIUM SERPL-MCNC: 4.1 MMOL/L — SIGNIFICANT CHANGE UP (ref 3.5–5.3)
POTASSIUM SERPL-MCNC: 4.8 MMOL/L — SIGNIFICANT CHANGE UP (ref 3.5–5.3)
POTASSIUM SERPL-MCNC: SIGNIFICANT CHANGE UP (ref 3.5–5.3)
POTASSIUM SERPL-SCNC: 4.1 MMOL/L — SIGNIFICANT CHANGE UP (ref 3.5–5.3)
POTASSIUM SERPL-SCNC: 4.8 MMOL/L — SIGNIFICANT CHANGE UP (ref 3.5–5.3)
POTASSIUM SERPL-SCNC: SIGNIFICANT CHANGE UP (ref 3.5–5.3)
PROT SERPL-MCNC: 6 G/DL — SIGNIFICANT CHANGE UP (ref 6–8.3)
PROTHROM AB SERPL-ACNC: 12.8 SEC — SIGNIFICANT CHANGE UP (ref 10–12.9)
PURKINJE CELL CYTOPLASMIC AB TYPE 2: NEGATIVE TITER — SIGNIFICANT CHANGE UP
PURKINJE CELLS AB TITR CSF IF: NEGATIVE TITER — SIGNIFICANT CHANGE UP
RBC # BLD: 2.41 M/UL — LOW (ref 3.8–5.2)
RBC # BLD: 2.59 M/UL — LOW (ref 3.8–5.2)
RBC # FLD: 14.8 % — HIGH (ref 10.3–14.5)
RBC # FLD: 15.1 % — HIGH (ref 10.3–14.5)
REFLEX ADDED: SIGNIFICANT CHANGE UP
SODIUM SERPL-SCNC: 139 MMOL/L — SIGNIFICANT CHANGE UP (ref 135–145)
SODIUM SERPL-SCNC: 141 MMOL/L — SIGNIFICANT CHANGE UP (ref 135–145)
SODIUM SERPL-SCNC: 144 MMOL/L — SIGNIFICANT CHANGE UP (ref 135–145)
SPECIMEN SOURCE: SIGNIFICANT CHANGE UP
VGKC AB CSF-SCNC: 0 NMOL/L — SIGNIFICANT CHANGE UP (ref 0–0.02)
WBC # BLD: 8.21 K/UL — SIGNIFICANT CHANGE UP (ref 3.8–10.5)
WBC # BLD: 8.46 K/UL — SIGNIFICANT CHANGE UP (ref 3.8–10.5)
WBC # FLD AUTO: 8.21 K/UL — SIGNIFICANT CHANGE UP (ref 3.8–10.5)
WBC # FLD AUTO: 8.46 K/UL — SIGNIFICANT CHANGE UP (ref 3.8–10.5)

## 2019-05-15 PROCEDURE — 99233 SBSQ HOSP IP/OBS HIGH 50: CPT

## 2019-05-15 PROCEDURE — 99291 CRITICAL CARE FIRST HOUR: CPT

## 2019-05-15 PROCEDURE — 99231 SBSQ HOSP IP/OBS SF/LOW 25: CPT

## 2019-05-15 PROCEDURE — 71045 X-RAY EXAM CHEST 1 VIEW: CPT | Mod: 26

## 2019-05-15 RX ORDER — PROPOFOL 10 MG/ML
5 INJECTION, EMULSION INTRAVENOUS
Qty: 1000 | Refills: 0 | Status: DISCONTINUED | OUTPATIENT
Start: 2019-05-15 | End: 2019-05-17

## 2019-05-15 RX ORDER — INSULIN HUMAN 100 [IU]/ML
4 INJECTION, SOLUTION SUBCUTANEOUS EVERY 6 HOURS
Refills: 0 | Status: DISCONTINUED | OUTPATIENT
Start: 2019-05-15 | End: 2019-05-21

## 2019-05-15 RX ORDER — LIDOCAINE HCL 20 MG/ML
5 VIAL (ML) INJECTION ONCE
Refills: 0 | Status: COMPLETED | OUTPATIENT
Start: 2019-05-15 | End: 2019-05-15

## 2019-05-15 RX ORDER — INSULIN GLARGINE 100 [IU]/ML
15 INJECTION, SOLUTION SUBCUTANEOUS AT BEDTIME
Refills: 0 | Status: DISCONTINUED | OUTPATIENT
Start: 2019-05-15 | End: 2019-05-17

## 2019-05-15 RX ORDER — MIDODRINE HYDROCHLORIDE 2.5 MG/1
5 TABLET ORAL EVERY 8 HOURS
Refills: 0 | Status: DISCONTINUED | OUTPATIENT
Start: 2019-05-15 | End: 2019-05-16

## 2019-05-15 RX ADMIN — NYSTATIN CREAM 1 APPLICATION(S): 100000 CREAM TOPICAL at 22:42

## 2019-05-15 RX ADMIN — INSULIN HUMAN 2 UNIT(S): 100 INJECTION, SOLUTION SUBCUTANEOUS at 11:20

## 2019-05-15 RX ADMIN — TAMOXIFEN CITRATE 20 MILLIGRAM(S): 20 TABLET, FILM COATED ORAL at 15:47

## 2019-05-15 RX ADMIN — PANTOPRAZOLE SODIUM 40 MILLIGRAM(S): 20 TABLET, DELAYED RELEASE ORAL at 11:19

## 2019-05-15 RX ADMIN — Medication 100 MILLIGRAM(S): at 11:19

## 2019-05-15 RX ADMIN — Medication 1 APPLICATION(S): at 22:09

## 2019-05-15 RX ADMIN — INSULIN HUMAN 2 UNIT(S): 100 INJECTION, SOLUTION SUBCUTANEOUS at 07:45

## 2019-05-15 RX ADMIN — ZINC OXIDE 1 APPLICATION(S): 200 OINTMENT TOPICAL at 07:43

## 2019-05-15 RX ADMIN — Medication 5 MILLILITER(S): at 18:56

## 2019-05-15 RX ADMIN — Medication 200 GRAM(S): at 17:00

## 2019-05-15 RX ADMIN — MIDODRINE HYDROCHLORIDE 5 MILLIGRAM(S): 2.5 TABLET ORAL at 11:19

## 2019-05-15 RX ADMIN — CHLORHEXIDINE GLUCONATE 1 APPLICATION(S): 213 SOLUTION TOPICAL at 07:43

## 2019-05-15 RX ADMIN — NYSTATIN CREAM 1 APPLICATION(S): 100000 CREAM TOPICAL at 07:42

## 2019-05-15 RX ADMIN — INSULIN HUMAN 2: 100 INJECTION, SOLUTION SUBCUTANEOUS at 07:45

## 2019-05-15 RX ADMIN — TAMOXIFEN CITRATE 20 MILLIGRAM(S): 20 TABLET, FILM COATED ORAL at 08:55

## 2019-05-15 RX ADMIN — INSULIN GLARGINE 15 UNIT(S): 100 INJECTION, SOLUTION SUBCUTANEOUS at 22:54

## 2019-05-15 RX ADMIN — ZINC OXIDE 1 APPLICATION(S): 200 OINTMENT TOPICAL at 13:19

## 2019-05-15 RX ADMIN — PROPOFOL 1.75 MICROGRAM(S)/KG/MIN: 10 INJECTION, EMULSION INTRAVENOUS at 11:20

## 2019-05-15 RX ADMIN — INSULIN HUMAN 4 UNIT(S): 100 INJECTION, SOLUTION SUBCUTANEOUS at 18:05

## 2019-05-15 RX ADMIN — Medication 1 APPLICATION(S): at 07:44

## 2019-05-15 RX ADMIN — SENNA PLUS 2 TABLET(S): 8.6 TABLET ORAL at 22:42

## 2019-05-15 RX ADMIN — Medication 1 APPLICATION(S): at 13:19

## 2019-05-15 RX ADMIN — INSULIN HUMAN 2: 100 INJECTION, SOLUTION SUBCUTANEOUS at 11:20

## 2019-05-15 RX ADMIN — NYSTATIN CREAM 1 APPLICATION(S): 100000 CREAM TOPICAL at 13:19

## 2019-05-15 RX ADMIN — MIDODRINE HYDROCHLORIDE 5 MILLIGRAM(S): 2.5 TABLET ORAL at 22:42

## 2019-05-15 RX ADMIN — ZINC OXIDE 1 APPLICATION(S): 200 OINTMENT TOPICAL at 22:42

## 2019-05-15 RX ADMIN — INSULIN HUMAN 2: 100 INJECTION, SOLUTION SUBCUTANEOUS at 18:05

## 2019-05-15 NOTE — PROGRESS NOTE ADULT - SUBJECTIVE AND OBJECTIVE BOX
GYN Progress Note    Patient seen at bedside.  stable overnight.  on levophed.  complaining of her port site bothering her.   tube feeds.     /63  HR 74  Physical Exam:  Gen: alert/responsive  Pulm: intubated  GI: soft, appropriately nontender, nondistended  Ext: SCDs in place, wnl    I&O's Summary    14 May 2019 07:  -  15 May 2019 07:00  --------------------------------------------------------  IN: 3129.1 mL / OUT: 2325 mL / NET: 804.1 mL    15 May 2019 07:  -  15 May 2019 15:30  --------------------------------------------------------  IN: 548.8 mL / OUT: 695 mL / NET: -146.2 mL      MEDICATIONS  (STANDING):  albumin human  5% IVPB 2750 milliLiter(s) IV Intermittent once  calcium gluconate IVPB 1 Gram(s) IV Intermittent once  chlorhexidine 2% Cloths 1 Application(s) Topical <User Schedule>  dextrose 5%. 1000 milliLiter(s) (50 mL/Hr) IV Continuous <Continuous>  dextrose 50% Injectable 25 Gram(s) IV Push once  docusate sodium Liquid 100 milliGRAM(s) Oral daily  enoxaparin Injectable 40 milliGRAM(s) SubCutaneous every 24 hours  heparin 1000 Unit(s) 1000 Unit(s) IV Push once  Heparin injectable 1000 units/mL 2000 Unit(s) 2000 Unit(s) IV Push once  Heparin injectable 1000 units/mL 2000 Unit(s) 2000 Unit(s) IV Push once  insulin glargine Injectable (LANTUS) 15 Unit(s) SubCutaneous at bedtime  insulin regular  human corrective regimen sliding scale   SubCutaneous every 6 hours  insulin regular  human recombinant 4 Unit(s) SubCutaneous every 6 hours  midodrine 5 milliGRAM(s) Oral every 8 hours  norepinephrine Infusion 0.05 MICROgram(s)/kG/Min (5.466 mL/Hr) IV Continuous <Continuous>  nystatin Powder 1 Application(s) Topical three times a day  pantoprazole   Suspension 40 milliGRAM(s) Oral daily  petrolatum Ophthalmic Ointment 1 Application(s) Both EYES three times a day  propofol Infusion 5 MICROgram(s)/kG/Min (1.749 mL/Hr) IV Continuous <Continuous>  senna 2 Tablet(s) Oral at bedtime  tamoxifen 20 milliGRAM(s) Oral <User Schedule>  zinc oxide 20% Ointment 1 Application(s) Topical three times a day    MEDICATIONS  (PRN):  dextrose 40% Gel 15 Gram(s) Oral once PRN Blood Glucose LESS THAN 70 milliGRAM(s)/deciliter  glucagon  Injectable 1 milliGRAM(s) IntraMuscular once PRN Glucose LESS THAN 70 milligrams/deciliter      LABS:                        7.1    8.46  )-----------( 253      ( 15 May 2019 07:00 )             22.3     05-15    139  |  99  |  18  ----------------------------<  172<H>  4.8   |  32<H>  |  0.36<L>    Ca    9.6      15 May 2019 07:00  Phos  3.3     05-15  Mg     1.8     05-15    TPro  6.6  /  Alb  4.4  /  TBili  0.5  /  DBili  x   /  AST  13  /  ALT  6<L>  /  AlkPhos  35<L>  05-14    PT/INR - ( 15 May 2019 07:00 )   PT: 12.8 sec;   INR: 1.13          PTT - ( 15 May 2019 07:00 )  PTT:30.0 sec  Urinalysis Basic - ( 13 May 2019 19:01 )    Color: Yellow / Appearance: SL Cloudy / S.020 / pH: x  Gluc: x / Ketone: NEGATIVE  / Bili: Negative / Urobili: 0.2 E.U./dL   Blood: x / Protein: 100 mg/dL / Nitrite: NEGATIVE   Leuk Esterase: Moderate / RBC: 5-10 /HPF / WBC Many /HPF   Sq Epi: x / Non Sq Epi: 5-10 /HPF / Bacteria: Present /HPF

## 2019-05-15 NOTE — OCCUPATIONAL THERAPY INITIAL EVALUATION ADULT - ORIENTATION, REHAB EVAL
oriented to person, place, time and situation/responds appropriately with choices, intubated, communicates with head/hand gestures

## 2019-05-15 NOTE — OCCUPATIONAL THERAPY INITIAL EVALUATION ADULT - SITTING BALANCE: DYNAMIC
patient sat at edge of bed approximately 15 minutes with min-mod posterior support. Participated in BUE reaching with anterior/lateral weight shift and trunk rotation with min A +VC/poor plus

## 2019-05-15 NOTE — PROGRESS NOTE ADULT - ASSESSMENT
60yo F PMHx polio, breast cancer, DM, Endometrial Cancer s/p exploratory laparotomy, enterolysis, SHAMIR, BSO, pelvic lymphadenectomy, low anterior resection mobilization of splenic flexure, end colostomy on 7/30/18, rectovaginal fistula, numerous admissions for urinary tract infection presented to [facility] in the setting of urosepsis. Hospital course complicated by blood stream infection (currently on ertapenem) and respiratory failure requiring intubation s/p extubation on 5/2. Neurology consulted for worsening lower extremity weakness. On prior admission EMG raises suspicion for CIDP-spectrum disorder. Per collateral information patient became noticeably weak mala in her hands for approx 1 week prior to being diagnosed with IDP in April, suggesting a more acute process. 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 s/p four days of IVIG 500mg/kg on 5/7 - 5/10. Now receiving plasma exchange therapy  Recommend  - plasma exhange today which is 2/5 doses, please continue Q48h for 5x total (10days)  - MG antibodies negative  - Pending MR brain w/ and w/o con & MR cervical spine w/ and w/o con once stable   Neurology will follow

## 2019-05-15 NOTE — OCCUPATIONAL THERAPY INITIAL EVALUATION ADULT - RANGE OF MOTION EXAMINATION, UPPER EXTREMITY
active shoulder flexion limited to approximately 90 degrees bilaterally/bilateral UE Active ROM was WFL  (within functional limits)

## 2019-05-15 NOTE — PROGRESS NOTE ADULT - ASSESSMENT
60 yo F PMHx polio, breast cancer, DM, Endometrial Cancer s/p exploratory laparotomy, enterolysis, SHAMIR, BSO, pelvic lymphadenectomy, low anterior resection mobilization of splenic flexure, end colostomy on 7/30/18, rectovaginal fistula, numerous admissions for urinary tract infection presented to [facility] in the setting of urosepsis. Hospital course complicated by blood stream infection (currently on ertapenem) and respiratory failure requiring intubation s/p extubation on 5/2. Intubated urgently on 7 lachman for CO2 narcosis in setting of AIDP and stepped up to MICU, initially improving s/p IVIG and extubated to bipap on 5/10, reintubated for CO2 narcosis on 5/14 and started on plasmapheresis     CARDIOVASCULAR  #Hemodynamics  Stable  - making appropriate amounts of urine, mentating well, on levophed     PULMONARY  #Acute hypercapnic respiratory failure   2/2 to demyelinating polyneuropathy (AIDP), patient extubated to bipap on 5/10, reintubated on 5/14 for CO2 narcosis  - s/p 4 doses of IVIG finished 5/11 and started on plasmapharesis on 5/13. 2nd round today. Will receive every other day for a total of 5 doses   - appreciate neurology recs  - appreciate transfusion medicine recs  - scopolamine patch placed on 5/13      #Hx of urinary retention  - retaining on 5/8, so placed joseph  - might need SP catheter  - appreciate urology recs    Neurology  #AIDP  Patient w AIDP leading to respiratory compromise, previous EMG w demyelinating polyneuropathy  - recs per neurology  - s/p 4 rounds of IVIG, with dramatic improvement in strength and respiratory status, however decompensated on 5/13 and reintubated  - f/u MRI brain w/wo contrast and MR cervical spine w/wo contrast when patient stable to go for imaging  - plasmapheresis as above under pulmonary    INFECTIOUS DISEASE  #hx of recurrent ESBL ecoli UTI and bacteremia  - previously on admission patient admitted to MICU in septic shock 2/2 to ESBL ecoli bacteremia and UTI  - continue ertepenem daily per ID recs. initially supposed to end 5/14, but continuing in setting of fever on 5/13 pm (however possibly due to asp pneumonitis s/p intubation    HEME/ONC  #Stage IV endometrial adenocarcinoma  Per GYN-ONC w/ interval increase in tumor burden. Pt was treated with Anastrazole and initiated 3 weeks of megace 80mg BID followed by 3 weeks of tamoxifen. started megace and metformin 4/10, now off megace and metformin  - c/w Tamoxifen for 21 days (5/1/19- ),  - GYN-ONC on board, follow recs    FLUIDS/ELECTROLYTES/NUTRITION  -IVF: none  -Monitor, Replete to K>4 and Mg>2  -Diet: continue NGT feeds for now, will get speech and swallow eval for recs    PROPHYLAXIS  -DVT: Lovenox and SCDs  -GI: none as on feeds    DISPO: MICU    CODE STATUS: DNR/DNI, palliative following 60 yo F PMHx polio, breast cancer, DM, Endometrial Cancer s/p exploratory laparotomy, enterolysis, SHAMIR, BSO, pelvic lymphadenectomy, low anterior resection mobilization of splenic flexure, end colostomy on 7/30/18, rectovaginal fistula, numerous admissions for urinary tract infection presented to [facility] in the setting of urosepsis. Hospital course complicated by blood stream infection (currently on ertapenem) and respiratory failure requiring intubation s/p extubation on 5/2. Intubated urgently on 7 lachman for CO2 narcosis in setting of AIDP and stepped up to MICU, initially improving s/p IVIG and extubated to bipap on 5/10, reintubated for CO2 narcosis on 5/14 and started on plasmapheresis     CARDIOVASCULAR  #Hemodynamics  Stable  - making appropriate amounts of urine, mentating well  - Taken off levophed this morning and started on midodrine 5mg TID    PULMONARY  #Acute hypercapnic respiratory failure   2/2 to demyelinating polyneuropathy (AIDP), patient extubated to bipap on 5/10, reintubated on 5/14 for CO2 narcosis  - s/p 4 doses of IVIG finished 5/11 and started on plasmapharesis on 5/13. 2nd round today. Will receive every other day for a total of 5 doses   - appreciate neurology recs  - appreciate transfusion medicine recs  - scopolamine patch placed on 5/13      #Hx of urinary retention  - retaining on 5/8, so placed joseph  - might need SP catheter  - appreciate urology recs    Neurology  #AIDP  Patient w AIDP leading to respiratory compromise, previous EMG w demyelinating polyneuropathy  - recs per neurology  - s/p 4 rounds of IVIG, with dramatic improvement in strength and respiratory status, however decompensated on 5/13 and reintubated  - f/u MRI brain w/wo contrast and MR cervical spine w/wo contrast when patient stable to go for imaging  - plasmapheresis as above under pulmonary    INFECTIOUS DISEASE  #hx of recurrent ESBL ecoli UTI and bacteremia  - previously on admission patient admitted to MICU in septic shock 2/2 to ESBL ecoli bacteremia and UTI  - Completed tx w/ ertapenem on 5/14     HEME/ONC  #Stage IV endometrial adenocarcinoma  Per GYN-ONC w/ interval increase in tumor burden. Pt was treated with Anastrazole and initiated 3 weeks of megace 80mg BID followed by 3 weeks of tamoxifen. started megace and metformin 4/10, now off megace and metformin  - c/w Tamoxifen for 21 days (5/1/19- )  - GYN-ONC on board, follow recs    FLUIDS/ELECTROLYTES/NUTRITION  -IVF: none  -Monitor, Replete to K>4 and Mg>2  -Diet: continue NGT feeds for now    PROPHYLAXIS  -DVT: Lovenox and SCDs  -GI: none as on feeds    DISPO: MICU    CODE STATUS: DNR/DNI, palliative following 58 yo F PMHx polio, breast cancer, DM, Endometrial Cancer s/p exploratory laparotomy, enterolysis, SHAMIR, BSO, pelvic lymphadenectomy, low anterior resection mobilization of splenic flexure, end colostomy on 7/30/18, rectovaginal fistula, numerous admissions for urinary tract infection presented to [facility] in the setting of urosepsis. Hospital course complicated by blood stream infection (currently on ertapenem) and respiratory failure requiring intubation s/p extubation on 5/2. Intubated urgently on 7 lachman for CO2 narcosis in setting of AIDP and stepped up to MICU, initially improving s/p IVIG and extubated to bipap on 5/10, reintubated for CO2 narcosis on 5/14 and started on plasmapheresis     CARDIOVASCULAR  #Hemodynamics  Stable  - making appropriate amounts of urine, mentating well  - Downtitrating levophed this morning and started on midodrine 5mg TID    PULMONARY  #Acute hypercapnic respiratory failure   2/2 to demyelinating polyneuropathy (AIDP), patient extubated to bipap on 5/10, reintubated on 5/14 for CO2 narcosis  - s/p 4 doses of IVIG finished 5/11 and started on plasmapharesis on 5/13. 2nd round today. Will receive every other day for a total of 5 doses   - appreciate neurology recs  - appreciate transfusion medicine recs  - scopolamine patch placed on 5/13      #Hx of urinary retention  - retaining on 5/8, so placed joseph  - might need SP catheter  - appreciate urology recs    Neurology  #AIDP  Patient w AIDP leading to respiratory compromise, previous EMG w demyelinating polyneuropathy  - recs per neurology  - s/p 4 rounds of IVIG, with dramatic improvement in strength and respiratory status, however decompensated on 5/13 and reintubated  - f/u MRI brain w/wo contrast and MR cervical spine w/wo contrast when patient stable to go for imaging  - plasmapheresis as above under pulmonary    INFECTIOUS DISEASE  #hx of recurrent ESBL ecoli UTI and bacteremia  - previously on admission patient admitted to MICU in septic shock 2/2 to ESBL ecoli bacteremia and UTI  - Completed tx w/ ertapenem on 5/14     HEME/ONC  #Stage IV endometrial adenocarcinoma  Per GYN-ONC w/ interval increase in tumor burden. Pt was treated with Anastrazole and initiated 3 weeks of megace 80mg BID followed by 3 weeks of tamoxifen. started megace and metformin 4/10, now off megace and metformin  - c/w Tamoxifen for 21 days (5/1/19- )  - GYN-ONC on board, follow recs    FLUIDS/ELECTROLYTES/NUTRITION  -IVF: none  -Monitor, Replete to K>4 and Mg>2  -Diet: continue NGT feeds for now    PROPHYLAXIS  -DVT: Lovenox and SCDs  -GI: none as on feeds    DISPO: MICU    CODE STATUS: DNR/DNI, palliative following

## 2019-05-15 NOTE — CHART NOTE - NSCHARTNOTEFT_GEN_A_CORE
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 Provides: 1056mL TV, 1367kcal, 74g protein, 866mL free H2O, 106% RDI, 1.55g/kg IBW protein, 22.5 non pro kcal    PO Intake: Good (%) [   ]  Fair (50-75%) [   ] Poor (<25%) [   ]- N/A NPO w/EN    GI Issues: No N/V/C/D reported at this time. Colostomy w/minimal output     Pain: Pt denies pain at this time     Skin Integrity: Micky 14  Sacrum stage I pressure injury       Labs:   05-15    139  |  99  |  18  ----------------------------<  172<H>  4.8   |  32<H>  |  0.36<L>    Ca    9.6      15 May 2019 07:00  Phos  3.3     05-15  Mg     1.8     05-15    TPro  6.6  /  Alb  4.4  /  TBili  0.5  /  DBili  x   /  AST  13  /  ALT  6<L>  /  AlkPhos  35<L>  05-14    CAPILLARY BLOOD GLUCOSE      POCT Blood Glucose.: 166 mg/dL (15 May 2019 10:47)  POCT Blood Glucose.: 191 mg/dL (15 May 2019 07:25)  POCT Blood Glucose.: 213 mg/dL (14 May 2019 23:05)  POCT Blood Glucose.: 226 mg/dL (14 May 2019 17:33)  POCT Blood Glucose.: 233 mg/dL (14 May 2019 13:48)      Medications:  MEDICATIONS  (STANDING):  albumin human  5% IVPB 2750 milliLiter(s) IV Intermittent once  calcium gluconate IVPB 1 Gram(s) IV Intermittent once  chlorhexidine 2% Cloths 1 Application(s) Topical <User Schedule>  dextrose 5%. 1000 milliLiter(s) (50 mL/Hr) IV Continuous <Continuous>  dextrose 50% Injectable 25 Gram(s) IV Push once  docusate sodium Liquid 100 milliGRAM(s) Oral daily  enoxaparin Injectable 40 milliGRAM(s) SubCutaneous every 24 hours  heparin 1000 Unit(s) 1000 Unit(s) IV Push once  Heparin injectable 1000 units/mL 2000 Unit(s) 2000 Unit(s) IV Push once  Heparin injectable 1000 units/mL 2000 Unit(s) 2000 Unit(s) IV Push once  insulin glargine Injectable (LANTUS) 15 Unit(s) SubCutaneous at bedtime  insulin regular  human corrective regimen sliding scale   SubCutaneous every 6 hours  insulin regular  human recombinant 4 Unit(s) SubCutaneous every 6 hours  midodrine 5 milliGRAM(s) Oral every 8 hours  norepinephrine Infusion 0.05 MICROgram(s)/kG/Min (5.466 mL/Hr) IV Continuous <Continuous>  nystatin Powder 1 Application(s) Topical three times a day  pantoprazole   Suspension 40 milliGRAM(s) Oral daily  petrolatum Ophthalmic Ointment 1 Application(s) Both EYES three times a day  propofol Infusion 5 MICROgram(s)/kG/Min (1.749 mL/Hr) IV Continuous <Continuous>  senna 2 Tablet(s) Oral at bedtime  tamoxifen 20 milliGRAM(s) Oral <User Schedule>  zinc oxide 20% Ointment 1 Application(s) Topical three times a day    MEDICATIONS  (PRN):  dextrose 40% Gel 15 Gram(s) Oral once PRN Blood Glucose LESS THAN 70 milliGRAM(s)/deciliter  glucagon  Injectable 1 milliGRAM(s) IntraMuscular once PRN Glucose LESS THAN 70 milligrams/deciliter      Weight: 58.3kg (4/30)  56.2kg (5/9)    Weight Change:   2kg weight loss since admit; needs being met inconsistently     Estimated energy needs:   Ideal body weight (47.7kg) used for calculations as pt >120% of IBW.   Nutrient needs based on [facility] standards of care for maintenance in older adults.   needs adjusted for age, PU stage 2, catabolic illness, vent  1190-1428kcal/day (25-30kcal/kg)  67-76g pro/day (1.4-1.6g/kg)  1428-1666ml fluid/day (30-35ml/kg)    Subjective:   55yo F known to this RD from prior admission with known stage IV endometrial cancer, likely with progression of disease, complex fistulae, chronic indwelling [place] admitted with fever and urosepsis. Pt intubated 2/2 acute hypoxic respiratory failure, and successfully extubated on 5/2. Pt transferred to [facility], and started on PO diet. Required reintubation on 5/8 2/2 CO2 narcosis likely d/t respiratory muscle insufficiency/AIDP. S/p IVIG and s/p LP. Pt extubated on 5/10, though reintubated on 5/13 for CO2 narcosis. Started on plasmapheresis. Per palliative note, pt now DNR/Do not reintubate. Pt is being optimized for extubation at this time. Discussed during MICU rounds. Currently she is intubated on CPAP trial. MAP 69- requiring levophed for BP support. EN running at goal of 44mL/hr w/good tolerance per RNSylvia Aceves WNL. Will continue to monitor for GOC and keep nutrition aligned at all times.     Previous Nutrition Diagnosis:  increased nutrient needs RT increased demand for kcal/pro AEB wt loss, pressure ulcer, catabolic illness  Active [  X ]  Resolved [   ]    If resolved, new PES:     Goal:  Pt to consistently meet % of estimated needs via tolerated route     Recommendations:  1. Continue with current EN order while pt is intubated. Monitor for s/s intolerance; maintain aspiration precautions at all times   2. Once she is extubated and passes dysphagia screen, recommend Consistent Carbohydrate Diet with Ensure Enlive TID (1050 kcal, 60g protein, 540mL free H2O)   3. Pain and bowel regimens per team   4. Keep nutrition aligned with GOC at all times     Education:   N/A- vent     Risk Level: High [  X ] Moderate [   ] Low [   ]

## 2019-05-15 NOTE — PROGRESS NOTE ADULT - SUBJECTIVE AND OBJECTIVE BOX
Patient seen during plasma exchange, which she is tolerating well.   PE VSS   Chest: clear to anterior auscultation    Cor: Reg S1S2    Ext: SCD in place   Labs reviewed  TPE today as planned and next treatment on Friday

## 2019-05-15 NOTE — OCCUPATIONAL THERAPY INITIAL EVALUATION ADULT - ADDITIONAL COMMENTS
Per patient she was admitted from [facility] where she was ambulating with RW and 2 person assist, participating in ADL tasks with assist. Prior to initial surgery in July 2018 patient was living with her brother in private house without steps, independent with ADL/ambulation.

## 2019-05-15 NOTE — PROGRESS NOTE ADULT - SUBJECTIVE AND OBJECTIVE BOX
NEUROLOGY CONSULT PROGRESS NOTE    INTERVAL HISTORY:    Patient with hypotension overnight and started on levophed. Discussion was held and patient is now DNR/DNI.     REVIEW OF SYSTEMS:  As per HPI, otherwise negative for Constitutional, Eyes, Ears/Nose/Mouth/Throat, Neck, Cardiovascular, Respiratory, Gastrointestinal, Genitourinary, Skin, Endocrine, Musculoskeletal, Psychiatric, and Hematologic/Lymphatic.    MEDICATIONS:  albumin human  5% IVPB 2750 milliLiter(s) IV Intermittent once  calcium gluconate IVPB 1 Gram(s) IV Intermittent once  chlorhexidine 2% Cloths 1 Application(s) Topical <User Schedule>  dextrose 40% Gel 15 Gram(s) Oral once PRN  dextrose 5%. 1000 milliLiter(s) IV Continuous <Continuous>  dextrose 50% Injectable 25 Gram(s) IV Push once  docusate sodium Liquid 100 milliGRAM(s) Oral daily  enoxaparin Injectable 40 milliGRAM(s) SubCutaneous every 24 hours  glucagon  Injectable 1 milliGRAM(s) IntraMuscular once PRN  heparin 1000 Unit(s) 1000 Unit(s) IV Push once  Heparin injectable 1000 units/mL 2000 Unit(s) 2000 Unit(s) IV Push once  Heparin injectable 1000 units/mL 2000 Unit(s) 2000 Unit(s) IV Push once  insulin glargine Injectable (LANTUS) 8 Unit(s) SubCutaneous at bedtime  insulin regular  human corrective regimen sliding scale   SubCutaneous every 6 hours  insulin regular  human recombinant 2 Unit(s) SubCutaneous every 6 hours  midodrine 5 milliGRAM(s) Oral every 8 hours  norepinephrine Infusion 0.05 MICROgram(s)/kG/Min IV Continuous <Continuous>  nystatin Powder 1 Application(s) Topical three times a day  pantoprazole   Suspension 40 milliGRAM(s) Oral daily  petrolatum Ophthalmic Ointment 1 Application(s) Both EYES three times a day  propofol Infusion 5 MICROgram(s)/kG/Min IV Continuous <Continuous>  senna 2 Tablet(s) Oral at bedtime  tamoxifen 20 milliGRAM(s) Oral <User Schedule>  zinc oxide 20% Ointment 1 Application(s) Topical three times a day    VITAL SIGNS:  Vital Signs Last 24 Hrs  T(C): 37.8 (15 May 2019 09:00), Max: 37.8 (15 May 2019 09:00)  T(F): 100 (15 May 2019 09:00), Max: 100 (15 May 2019 09:00)  HR: 68 (15 May 2019 12:09) (68 - 88)  BP: 129/64 (15 May 2019 12:01) (89/48 - 138/59)  BP(mean): 82 (15 May 2019 12:01) (60 - 99)  RR: 19 (15 May 2019 12:01) (11 - 60)  SpO2: 100% (15 May 2019 12:09) (95% - 100%)    PHYSICAL EXAMINATION:  Constitutional: Frail middle age female, intubated, NG tube in place  Eyes: Conjunctiva and sclera clear  Neurologic:  - Mental Status:  Awake and alert, able to follow commands as directed  - Cranial Nerves II-XII:    II: Pupils are equal, round, and reactive to light.  III, IV, VI:  EOMI  V:  Facial sensation is intact  VII:  Strong eye closure   VIII:  Hearing is intact to finger rub.  XI:  Weak shoulder shrug, able to move head  - Motor: Hypotrophic muscles. Upper extremities 4-/5 Lower extremities Leg 2/5 bilaterally, Ankle 0/5  - Reflexes:  0/5 reflexes at biceps, triceps, brachioradialis, knees, and ankles. No babinski response.   - Sensory:  Intact to light touch  - Gait: Deferred    LABS:                          7.1    8.46  )-----------( 253      ( 15 May 2019 07:00 )             22.3     05-15    139  |  99  |  18  ----------------------------<  172<H>  4.8   |  32<H>  |  0.36<L>    Ca    9.6      15 May 2019 07:00  Phos  3.3     05-15  Mg     1.8     05-15    TPro  6.6  /  Alb  4.4  /  TBili  0.5  /  DBili  x   /  AST  13  /  ALT  6<L>  /  AlkPhos  35<L>  05-14    PT/INR - ( 15 May 2019 07:00 )   PT: 12.8 sec;   INR: 1.13          PTT - ( 15 May 2019 07:00 )  PTT:30.0 sec  Urinalysis Basic - ( 13 May 2019 19:01 )    Color: Yellow / Appearance: SL Cloudy / S.020 / pH: x  Gluc: x / Ketone: NEGATIVE  / Bili: Negative / Urobili: 0.2 E.U./dL   Blood: x / Protein: 100 mg/dL / Nitrite: NEGATIVE   Leuk Esterase: Moderate / RBC: 5-10 /HPF / WBC Many /HPF   Sq Epi: x / Non Sq Epi: 5-10 /HPF / Bacteria: Present /HPF        RADIOLOGY & ADDITIONAL STUDIES:

## 2019-05-15 NOTE — PROGRESS NOTE ADULT - SUBJECTIVE AND OBJECTIVE BOX
INTERVAL HPI/OVERNIGHT EVENTS: K downtrended to 5 last night.     SUBJECTIVE: Patient seen and examined at bedside.     CONSTITUTIONAL: No weakness, fevers or chills  EYES/ENT: No visual changes;  No vertigo or throat pain   NECK: No pain or stiffness  RESPIRATORY: No cough, wheezing, hemoptysis; No shortness of breath  CARDIOVASCULAR: No chest pain or palpitations  GASTROINTESTINAL: No abdominal or epigastric pain. No nausea, vomiting, or hematemesis; No diarrhea or constipation. No melena or hematochezia.  GENITOURINARY: No dysuria, frequency or hematuria  NEUROLOGICAL: No numbness or weakness  SKIN: No itching, rashes    OBJECTIVE:    VITAL SIGNS:  ICU Vital Signs Last 24 Hrs  T(C): 36.7 (15 May 2019 06:06), Max: 37.6 (15 May 2019 01:02)  T(F): 98 (15 May 2019 06:06), Max: 99.6 (15 May 2019 01:02)  HR: 74 (15 May 2019 05:00) (66 - 88)  BP: 128/70 (15 May 2019 05:00) (89/48 - 138/59)  BP(mean): 93 (15 May 2019 05:00) (60 - 98)  ABP: --  ABP(mean): --  RR: 16 (15 May 2019 05:00) (11 - 60)  SpO2: 100% (15 May 2019 05:00) (98% - 100%)    Mode: AC/ CMV (Assist Control/ Continuous Mandatory Ventilation), RR (machine): 12, TV (machine): 320, FiO2: 40, PEEP: 5, ITime: 1, MAP: 7.6, PIP: 19     @ 07:  -  14 @ 07:00  --------------------------------------------------------  IN: 2650.2 mL / OUT: 1202 mL / NET: 1448.2 mL    14 @ 07:01  -  15 @ 06:22  --------------------------------------------------------  IN: 2715.9 mL / OUT: 2125 mL / NET: 590.9 mL      CAPILLARY BLOOD GLUCOSE      POCT Blood Glucose.: 213 mg/dL (14 May 2019 23:05)      PHYSICAL EXAM:    General: NAD, comfortable on ventilator, appears stronger and more alert than yesterday prior to intubation  HEENT: NCAT, PERRL, clear conjunctiva, no scleral icterus  Neck: supple, no JVD, R HD cath in place  Respiratory: good air entry bilaterally, Mild rhonchi bilaterally.  Cardiovascular: RRR, normal S1S2, no M/R/G  Vascular: 1+ radial and DP pulses  Abdomen: soft, NT/ND, bowel sounds present, L colostomy w pink stoma, no palpable masses  Extremities: cool to touch, no clubbing, cyanosis, or edema  Skin: No rashes present  Neuro: 1-2/5 strength in legs and feet, 4/5 strength in upper extremities.    MEDICATIONS:  MEDICATIONS  (STANDING):  albumin human  5% IVPB 2750 milliLiter(s) IV Intermittent once  calcium gluconate IVPB 1 Gram(s) IV Intermittent once  chlorhexidine 2% Cloths 1 Application(s) Topical <User Schedule>  dextrose 5%. 1000 milliLiter(s) (50 mL/Hr) IV Continuous <Continuous>  dextrose 50% Injectable 25 Gram(s) IV Push once  docusate sodium Liquid 100 milliGRAM(s) Oral daily  enoxaparin Injectable 40 milliGRAM(s) SubCutaneous every 24 hours  heparin 1000 Unit(s) 1000 Unit(s) IV Push once  Heparin injectable 1000 units/mL 2000 Unit(s) 2000 Unit(s) IV Push once  Heparin injectable 1000 units/mL 2000 Unit(s) 2000 Unit(s) IV Push once  insulin glargine Injectable (LANTUS) 8 Unit(s) SubCutaneous at bedtime  insulin regular  human corrective regimen sliding scale   SubCutaneous every 6 hours  insulin regular  human recombinant 2 Unit(s) SubCutaneous every 6 hours  norepinephrine Infusion 0.05 MICROgram(s)/kG/Min (5.466 mL/Hr) IV Continuous <Continuous>  nystatin Powder 1 Application(s) Topical three times a day  pantoprazole   Suspension 40 milliGRAM(s) Oral daily  petrolatum Ophthalmic Ointment 1 Application(s) Both EYES three times a day  senna 2 Tablet(s) Oral at bedtime  tamoxifen 20 milliGRAM(s) Oral <User Schedule>  zinc oxide 20% Ointment 1 Application(s) Topical three times a day    MEDICATIONS  (PRN):  dextrose 40% Gel 15 Gram(s) Oral once PRN Blood Glucose LESS THAN 70 milliGRAM(s)/deciliter  glucagon  Injectable 1 milliGRAM(s) IntraMuscular once PRN Glucose LESS THAN 70 milligrams/deciliter      ALLERGIES:  Allergies    No Known Allergies    Intolerances    IVIG PRODUCT IS PRIGIVEN OR GAMMUNEX (Unknown)      LABS:                        7.7    9.12  )-----------( 275      ( 14 May 2019 11:29 )             24.7     05-14    141  |  100  |  21  ----------------------------<  254<H>  5.0   |  34<H>  |  0.38<L>    Ca    9.6      14 May 2019 19:39  Phos  3.2       Mg     2.1         TPro  6.6  /  Alb  4.4  /  TBili  0.5  /  DBili  x   /  AST  13  /  ALT  6<L>  /  AlkPhos  35<L>  -14    PT/INR - ( 14 May 2019 12:00 )   PT: 13.9 sec;   INR: 1.22          PTT - ( 14 May 2019 12:00 )  PTT:27.5 sec  Urinalysis Basic - ( 13 May 2019 19:01 )    Color: Yellow / Appearance: SL Cloudy / S.020 / pH: x  Gluc: x / Ketone: NEGATIVE  / Bili: Negative / Urobili: 0.2 E.U./dL   Blood: x / Protein: 100 mg/dL / Nitrite: NEGATIVE   Leuk Esterase: Moderate / RBC: 5-10 /HPF / WBC Many /HPF   Sq Epi: x / Non Sq Epi: 5-10 /HPF / Bacteria: Present /HPF        RADIOLOGY & ADDITIONAL TESTS: Reviewed. INTERVAL HPI/OVERNIGHT EVENTS: K downtrended to 5 last night. Started on CPAP trial at 5:30 A.     SUBJECTIVE: Patient seen and examined at bedside. Tolerating CPAP trial this morning. Denies any new complaints but wants to be off the vent completely.     CONSTITUTIONAL: No weakness, fevers or chills  EYES/ENT: No visual changes;  No vertigo or throat pain   NECK: No pain or stiffness  RESPIRATORY: No cough, wheezing, hemoptysis; No shortness of breath  CARDIOVASCULAR: No chest pain or palpitations  GASTROINTESTINAL: No abdominal or epigastric pain. No nausea, vomiting, or hematemesis; No diarrhea or constipation. No melena or hematochezia.  GENITOURINARY: No dysuria, frequency or hematuria  NEUROLOGICAL: No numbness or weakness  SKIN: No itching, rashes    OBJECTIVE:    VITAL SIGNS:  ICU Vital Signs Last 24 Hrs  T(C): 36.7 (15 May 2019 06:06), Max: 37.6 (15 May 2019 01:02)  T(F): 98 (15 May 2019 06:06), Max: 99.6 (15 May 2019 01:02)  HR: 74 (15 May 2019 05:00) (66 - 88)  BP: 128/70 (15 May 2019 05:00) (89/48 - 138/59)  BP(mean): 93 (15 May 2019 05:00) (60 - 98)  ABP: --  ABP(mean): --  RR: 16 (15 May 2019 05:00) (11 - 60)  SpO2: 100% (15 May 2019 05:00) (98% - 100%)    Mode: AC/ CMV (Assist Control/ Continuous Mandatory Ventilation), RR (machine): 12, TV (machine): 320, FiO2: 40, PEEP: 5, ITime: 1, MAP: 7.6, PIP: 19    -13 @ 07:  -  14 @ 07:00  --------------------------------------------------------  IN: 2650.2 mL / OUT: 1202 mL / NET: 1448.2 mL     @ 07:  -  15 @ 06:22  --------------------------------------------------------  IN: 2715.9 mL / OUT: 2125 mL / NET: 590.9 mL      CAPILLARY BLOOD GLUCOSE      POCT Blood Glucose.: 213 mg/dL (14 May 2019 23:05)      PHYSICAL EXAM:    General: NAD, comfortable on CPAP trial   HEENT: NCAT, PERRL, clear conjunctiva, no scleral icterus  Neck: supple, no JVD, R HD cath in place  Respiratory: good air entry bilaterally, Mild rhonchi bilaterally.  Cardiovascular: RRR, normal S1S2, no M/R/G  Vascular: 1+ radial and DP pulses  Abdomen: soft, NT/ND, bowel sounds present, L colostomy w pink stoma, no palpable masses  Extremities: cool to touch, no clubbing, cyanosis, or edema  Skin: No rashes present  Neuro: 1/5 strength in feet, 2/5 in legs b/l, 4/5 strength in upper extremities. No focal deficits     MEDICATIONS:  MEDICATIONS  (STANDING):  albumin human  5% IVPB 2750 milliLiter(s) IV Intermittent once  calcium gluconate IVPB 1 Gram(s) IV Intermittent once  chlorhexidine 2% Cloths 1 Application(s) Topical <User Schedule>  dextrose 5%. 1000 milliLiter(s) (50 mL/Hr) IV Continuous <Continuous>  dextrose 50% Injectable 25 Gram(s) IV Push once  docusate sodium Liquid 100 milliGRAM(s) Oral daily  enoxaparin Injectable 40 milliGRAM(s) SubCutaneous every 24 hours  heparin 1000 Unit(s) 1000 Unit(s) IV Push once  Heparin injectable 1000 units/mL 2000 Unit(s) 2000 Unit(s) IV Push once  Heparin injectable 1000 units/mL 2000 Unit(s) 2000 Unit(s) IV Push once  insulin glargine Injectable (LANTUS) 8 Unit(s) SubCutaneous at bedtime  insulin regular  human corrective regimen sliding scale   SubCutaneous every 6 hours  insulin regular  human recombinant 2 Unit(s) SubCutaneous every 6 hours  norepinephrine Infusion 0.05 MICROgram(s)/kG/Min (5.466 mL/Hr) IV Continuous <Continuous>  nystatin Powder 1 Application(s) Topical three times a day  pantoprazole   Suspension 40 milliGRAM(s) Oral daily  petrolatum Ophthalmic Ointment 1 Application(s) Both EYES three times a day  senna 2 Tablet(s) Oral at bedtime  tamoxifen 20 milliGRAM(s) Oral <User Schedule>  zinc oxide 20% Ointment 1 Application(s) Topical three times a day    MEDICATIONS  (PRN):  dextrose 40% Gel 15 Gram(s) Oral once PRN Blood Glucose LESS THAN 70 milliGRAM(s)/deciliter  glucagon  Injectable 1 milliGRAM(s) IntraMuscular once PRN Glucose LESS THAN 70 milligrams/deciliter      ALLERGIES:  Allergies    No Known Allergies    Intolerances    IVIG PRODUCT IS PRIGIVEN OR GAMMUNEX (Unknown)    .  LABS:                         7.1    8.46  )-----------( 253      ( 15 May 2019 07:00 )             22.3     05-15    139  |  99  |  18  ----------------------------<  172<H>  4.8   |  32<H>  |  0.36<L>    Ca    9.6      15 May 2019 07:00  Phos  3.3     05-15  Mg     1.8     05-15    TPro  6.6  /  Alb  4.4  /  TBili  0.5  /  DBili  x   /  AST  13  /  ALT  6<L>  /  AlkPhos  35<L>  05-14    PT/INR - ( 15 May 2019 07:00 )   PT: 12.8 sec;   INR: 1.13          PTT - ( 15 May 2019 07:00 )  PTT:30.0 sec  Urinalysis Basic - ( 13 May 2019 19:01 )    Color: Yellow / Appearance: SL Cloudy / S.020 / pH: x  Gluc: x / Ketone: NEGATIVE  / Bili: Negative / Urobili: 0.2 E.U./dL   Blood: x / Protein: 100 mg/dL / Nitrite: NEGATIVE   Leuk Esterase: Moderate / RBC: 5-10 /HPF / WBC Many /HPF   Sq Epi: x / Non Sq Epi: 5-10 /HPF / Bacteria: Present /HPF

## 2019-05-15 NOTE — OCCUPATIONAL THERAPY INITIAL EVALUATION ADULT - MD ORDER
Per chart, 60 yo F PMHx polio, breast cancer, DM, Endometrial Cancer s/p exploratory laparotomy, enterolysis, SHAMIR, BSO, pelvic lymphadenectomy, low anterior resection mobilization of splenic flexure, end colostomy on 7/30/18, rectovaginal fistula, numerous admissions for urinary tract infection presented to [facility] in the setting of urosepsis.

## 2019-05-15 NOTE — OCCUPATIONAL THERAPY INITIAL EVALUATION ADULT - PLANNED THERAPY INTERVENTIONS, OT EVAL
IADL retraining/ADL retraining/balance training/bed mobility training/neuromuscular re-education/fine motor coordination training/strengthening/transfer training

## 2019-05-15 NOTE — PROGRESS NOTE ADULT - ASSESSMENT
60yo F HD16 with stage IV endometrial adenocarcinoma s/p staging surgery '18 and 1 round of chemotherapy 2/19 readmitted from [facility] with fever, previously admitted for management of symptomatic rectovaginal and enterovaginal fistulas. Presents on referral from [facility] with urosepsis. Rapid response called due to worsening respiratory status s/p intubation and MICU admission. She then was on regional GYN service however had respiratory weakness and was transferred to Medicine/tele HD9 until requiring intubation again HD10 and is now in MICU. 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 She has been on plasmapheresis. She as re-intubated on 5/13 due to respiratory distress. She was made DNR/DNI 5/14.   Management per MICU.    1. Neuro: weakness secondary Guillain [place] versus exacerbation of AIDP- now status post IVIG x4 day course. Neurology following-appreciate recs. Patient has likely failed IVIG treatment given current status. Now undergoing plasmapheresis next 5/15. CSF culture no organisms seen, MRI when stable, original plan per neurology will treat with IVIG x2 days every 2wks.  2. Pulm: Re-intubated 5/13. Extubated 5/10-transitioned to BiPAP initially, then on nasal canula 4L. re-intubated due to respiratory distress. now DNR/DNI.   3. Cardio: again on pressor support. Appropriate urine output   4. FEN/GI: Dobhoff in situ, on tube feeds. repleting electrotye   5. : Adequate urine output, joseph in place.  - Joseph replaced 5/8 after finding of overflow incontinence, f/u urology recs regarding suprapubic catheter   - rec UA, Ucx 5/13 due to turbid urine, UA neg nitrites, cx sent  6. ID: s/p ertapenem ended 5/14, s/p meropenem, s/p vanc, appreciate ID recs. febrile 5/13. has low grade fevers 100.  7. Endocrine: FS, ISS, Lantus 8u qhs started 5/14, Metformin 1000mg BID held at this time   8. VTE prophylaxis - SCDs, Lovenox 40mg daily   9. GYN malignancy  -Stopped anastrazole and initiated 3 weeks of megace 80mg BID followed by 3 weeks of tamoxifen.   - Started megace and metformin 4/10, now on Tamoxifen for 21 days (5/1/19-  Compound version given. Currently being held.  -  Avoid immunotherapy at this time given associated risk of autoimmune disease   10. Derm: monitor sacrum for s/s of pressure ulcer, now stage 2  11. PT/OT needs evaluation, OOB with nursing staff  12. Social work/palliative: DNR/DNI

## 2019-05-15 NOTE — OCCUPATIONAL THERAPY INITIAL EVALUATION ADULT - GENERAL OBSERVATIONS, REHAB EVAL
Right hand dominant. Chart reviewed, patient cleared for OT by ANTONIO Rainey. Received semi-supine, NAD, +SCDs, +z-flex boots, +joseph, +tele, +IV, +NGT, right IJ port, +ETT @21cm CPAP FiO2 40% PEEP 5.0

## 2019-05-15 NOTE — PROGRESS NOTE ADULT - ATTENDING COMMENTS
I was physically present for the key portions of the evaluation and managemnent (E/M) service provided.  I agree with the above history, physical, and plan which I have reviewed and edited where appropriate, with the exceptions as per my note.    pt seen and examined on 5/15/19. dos 5/15/19.    neuro exam  alert, awake, intubated.  4/5 in both UEs diffusely, 2/5 in both HFs, 0/5 in feet.    AP: cont plex.

## 2019-05-16 LAB
ANION GAP SERPL CALC-SCNC: 8 MMOL/L — SIGNIFICANT CHANGE UP (ref 5–17)
APTT BLD: 33.6 SEC — SIGNIFICANT CHANGE UP (ref 27.5–36.3)
BLD GP AB SCN SERPL QL: NEGATIVE — SIGNIFICANT CHANGE UP
BUN SERPL-MCNC: 17 MG/DL — SIGNIFICANT CHANGE UP (ref 7–23)
CALCIUM SERPL-MCNC: 9.5 MG/DL — SIGNIFICANT CHANGE UP (ref 8.4–10.5)
CHLORIDE SERPL-SCNC: 98 MMOL/L — SIGNIFICANT CHANGE UP (ref 96–108)
CO2 SERPL-SCNC: 36 MMOL/L — HIGH (ref 22–31)
CREAT SERPL-MCNC: 0.41 MG/DL — LOW (ref 0.5–1.3)
FIBRINOGEN PPP-MCNC: 261 MG/DL — SIGNIFICANT CHANGE UP (ref 258–438)
GLUCOSE BLDC GLUCOMTR-MCNC: 164 MG/DL — HIGH (ref 70–99)
GLUCOSE BLDC GLUCOMTR-MCNC: 168 MG/DL — HIGH (ref 70–99)
GLUCOSE BLDC GLUCOMTR-MCNC: 182 MG/DL — HIGH (ref 70–99)
GLUCOSE BLDC GLUCOMTR-MCNC: 84 MG/DL — SIGNIFICANT CHANGE UP (ref 70–99)
GLUCOSE SERPL-MCNC: 157 MG/DL — HIGH (ref 70–99)
HCT VFR BLD CALC: 23.8 % — LOW (ref 34.5–45)
HGB BLD-MCNC: 7.5 G/DL — LOW (ref 11.5–15.5)
INR BLD: 1.13 — SIGNIFICANT CHANGE UP (ref 0.88–1.16)
MAGNESIUM SERPL-MCNC: 1.8 MG/DL — SIGNIFICANT CHANGE UP (ref 1.6–2.6)
MCHC RBC-ENTMCNC: 29 PG — SIGNIFICANT CHANGE UP (ref 27–34)
MCHC RBC-ENTMCNC: 31.5 GM/DL — LOW (ref 32–36)
MCV RBC AUTO: 91.9 FL — SIGNIFICANT CHANGE UP (ref 80–100)
NRBC # BLD: 0 /100 WBCS — SIGNIFICANT CHANGE UP (ref 0–0)
PHOSPHATE SERPL-MCNC: 3.2 MG/DL — SIGNIFICANT CHANGE UP (ref 2.5–4.5)
PLATELET # BLD AUTO: 295 K/UL — SIGNIFICANT CHANGE UP (ref 150–400)
POTASSIUM SERPL-MCNC: 4 MMOL/L — SIGNIFICANT CHANGE UP (ref 3.5–5.3)
POTASSIUM SERPL-SCNC: 4 MMOL/L — SIGNIFICANT CHANGE UP (ref 3.5–5.3)
PROTHROM AB SERPL-ACNC: 12.8 SEC — SIGNIFICANT CHANGE UP (ref 10–12.9)
RBC # BLD: 2.59 M/UL — LOW (ref 3.8–5.2)
RBC # FLD: 14.9 % — HIGH (ref 10.3–14.5)
RH IG SCN BLD-IMP: POSITIVE — SIGNIFICANT CHANGE UP
SODIUM SERPL-SCNC: 142 MMOL/L — SIGNIFICANT CHANGE UP (ref 135–145)
WBC # BLD: 7.33 K/UL — SIGNIFICANT CHANGE UP (ref 3.8–10.5)
WBC # FLD AUTO: 7.33 K/UL — SIGNIFICANT CHANGE UP (ref 3.8–10.5)

## 2019-05-16 PROCEDURE — 99233 SBSQ HOSP IP/OBS HIGH 50: CPT | Mod: GC

## 2019-05-16 PROCEDURE — 71045 X-RAY EXAM CHEST 1 VIEW: CPT | Mod: 26

## 2019-05-16 PROCEDURE — 99233 SBSQ HOSP IP/OBS HIGH 50: CPT

## 2019-05-16 PROCEDURE — 99231 SBSQ HOSP IP/OBS SF/LOW 25: CPT

## 2019-05-16 RX ORDER — ALBUMIN HUMAN 25 %
2750 VIAL (ML) INTRAVENOUS ONCE
Refills: 0 | Status: DISCONTINUED | OUTPATIENT
Start: 2019-05-17 | End: 2019-05-20

## 2019-05-16 RX ORDER — NOREPINEPHRINE BITARTRATE/D5W 8 MG/250ML
0.05 PLASTIC BAG, INJECTION (ML) INTRAVENOUS
Qty: 8 | Refills: 0 | Status: DISCONTINUED | OUTPATIENT
Start: 2019-05-16 | End: 2019-05-23

## 2019-05-16 RX ORDER — SODIUM CHLORIDE 9 MG/ML
500 INJECTION INTRAMUSCULAR; INTRAVENOUS; SUBCUTANEOUS ONCE
Refills: 0 | Status: COMPLETED | OUTPATIENT
Start: 2019-05-16 | End: 2019-05-16

## 2019-05-16 RX ORDER — CHLORHEXIDINE GLUCONATE 213 G/1000ML
15 SOLUTION TOPICAL EVERY 12 HOURS
Refills: 0 | Status: DISCONTINUED | OUTPATIENT
Start: 2019-05-16 | End: 2019-06-19

## 2019-05-16 RX ORDER — ACETAMINOPHEN 500 MG
650 TABLET ORAL ONCE
Refills: 0 | Status: COMPLETED | OUTPATIENT
Start: 2019-05-16 | End: 2019-05-16

## 2019-05-16 RX ORDER — ACETAMINOPHEN 500 MG
650 TABLET ORAL ONCE
Refills: 0 | Status: COMPLETED | OUTPATIENT
Start: 2019-05-16 | End: 2019-05-17

## 2019-05-16 RX ORDER — CALCIUM GLUCONATE 100 MG/ML
1 VIAL (ML) INTRAVENOUS ONCE
Refills: 0 | Status: DISCONTINUED | OUTPATIENT
Start: 2019-05-17 | End: 2019-05-20

## 2019-05-16 RX ORDER — MIDODRINE HYDROCHLORIDE 2.5 MG/1
10 TABLET ORAL EVERY 8 HOURS
Refills: 0 | Status: DISCONTINUED | OUTPATIENT
Start: 2019-05-16 | End: 2019-05-17

## 2019-05-16 RX ORDER — MAGNESIUM SULFATE 500 MG/ML
1 VIAL (ML) INJECTION ONCE
Refills: 0 | Status: COMPLETED | OUTPATIENT
Start: 2019-05-16 | End: 2019-05-16

## 2019-05-16 RX ORDER — DEXTROSE 50 % IN WATER 50 %
25 SYRINGE (ML) INTRAVENOUS ONCE
Refills: 0 | Status: DISCONTINUED | OUTPATIENT
Start: 2019-05-16 | End: 2019-05-17

## 2019-05-16 RX ORDER — SODIUM CHLORIDE 9 MG/ML
500 INJECTION INTRAMUSCULAR; INTRAVENOUS; SUBCUTANEOUS ONCE
Refills: 0 | Status: DISCONTINUED | OUTPATIENT
Start: 2019-05-16 | End: 2019-05-21

## 2019-05-16 RX ORDER — ACETAMINOPHEN 500 MG
650 TABLET ORAL ONCE
Refills: 0 | Status: DISCONTINUED | OUTPATIENT
Start: 2019-05-16 | End: 2019-05-16

## 2019-05-16 RX ADMIN — ZINC OXIDE 1 APPLICATION(S): 200 OINTMENT TOPICAL at 23:46

## 2019-05-16 RX ADMIN — MIDODRINE HYDROCHLORIDE 10 MILLIGRAM(S): 2.5 TABLET ORAL at 23:43

## 2019-05-16 RX ADMIN — Medication 1 APPLICATION(S): at 06:00

## 2019-05-16 RX ADMIN — SODIUM CHLORIDE 2000 MILLILITER(S): 9 INJECTION INTRAMUSCULAR; INTRAVENOUS; SUBCUTANEOUS at 19:12

## 2019-05-16 RX ADMIN — Medication 650 MILLIGRAM(S): at 18:44

## 2019-05-16 RX ADMIN — Medication 100 MILLIGRAM(S): at 12:18

## 2019-05-16 RX ADMIN — TAMOXIFEN CITRATE 20 MILLIGRAM(S): 20 TABLET, FILM COATED ORAL at 15:30

## 2019-05-16 RX ADMIN — INSULIN HUMAN 4 UNIT(S): 100 INJECTION, SOLUTION SUBCUTANEOUS at 17:51

## 2019-05-16 RX ADMIN — NYSTATIN CREAM 1 APPLICATION(S): 100000 CREAM TOPICAL at 23:44

## 2019-05-16 RX ADMIN — MIDODRINE HYDROCHLORIDE 5 MILLIGRAM(S): 2.5 TABLET ORAL at 05:58

## 2019-05-16 RX ADMIN — INSULIN HUMAN 2: 100 INJECTION, SOLUTION SUBCUTANEOUS at 17:50

## 2019-05-16 RX ADMIN — ZINC OXIDE 1 APPLICATION(S): 200 OINTMENT TOPICAL at 05:58

## 2019-05-16 RX ADMIN — INSULIN HUMAN 4 UNIT(S): 100 INJECTION, SOLUTION SUBCUTANEOUS at 05:58

## 2019-05-16 RX ADMIN — INSULIN HUMAN 2: 100 INJECTION, SOLUTION SUBCUTANEOUS at 05:57

## 2019-05-16 RX ADMIN — CHLORHEXIDINE GLUCONATE 1 APPLICATION(S): 213 SOLUTION TOPICAL at 07:00

## 2019-05-16 RX ADMIN — INSULIN HUMAN 4 UNIT(S): 100 INJECTION, SOLUTION SUBCUTANEOUS at 12:19

## 2019-05-16 RX ADMIN — MIDODRINE HYDROCHLORIDE 10 MILLIGRAM(S): 2.5 TABLET ORAL at 13:22

## 2019-05-16 RX ADMIN — NYSTATIN CREAM 1 APPLICATION(S): 100000 CREAM TOPICAL at 13:23

## 2019-05-16 RX ADMIN — Medication 5.47 MICROGRAM(S)/KG/MIN: at 00:08

## 2019-05-16 RX ADMIN — TAMOXIFEN CITRATE 20 MILLIGRAM(S): 20 TABLET, FILM COATED ORAL at 08:56

## 2019-05-16 RX ADMIN — Medication 650 MILLIGRAM(S): at 19:36

## 2019-05-16 RX ADMIN — CHLORHEXIDINE GLUCONATE 15 MILLILITER(S): 213 SOLUTION TOPICAL at 17:50

## 2019-05-16 RX ADMIN — INSULIN HUMAN 2: 100 INJECTION, SOLUTION SUBCUTANEOUS at 12:18

## 2019-05-16 RX ADMIN — PROPOFOL 1.75 MICROGRAM(S)/KG/MIN: 10 INJECTION, EMULSION INTRAVENOUS at 00:08

## 2019-05-16 RX ADMIN — ZINC OXIDE 1 APPLICATION(S): 200 OINTMENT TOPICAL at 13:23

## 2019-05-16 RX ADMIN — Medication 1 APPLICATION(S): at 23:45

## 2019-05-16 RX ADMIN — Medication 100 GRAM(S): at 09:13

## 2019-05-16 RX ADMIN — SENNA PLUS 2 TABLET(S): 8.6 TABLET ORAL at 23:43

## 2019-05-16 RX ADMIN — ENOXAPARIN SODIUM 40 MILLIGRAM(S): 100 INJECTION SUBCUTANEOUS at 23:42

## 2019-05-16 RX ADMIN — INSULIN HUMAN 4 UNIT(S): 100 INJECTION, SOLUTION SUBCUTANEOUS at 00:16

## 2019-05-16 RX ADMIN — Medication 1 APPLICATION(S): at 13:25

## 2019-05-16 RX ADMIN — PANTOPRAZOLE SODIUM 40 MILLIGRAM(S): 20 TABLET, DELAYED RELEASE ORAL at 12:18

## 2019-05-16 RX ADMIN — INSULIN HUMAN 4: 100 INJECTION, SOLUTION SUBCUTANEOUS at 23:14

## 2019-05-16 RX ADMIN — ENOXAPARIN SODIUM 40 MILLIGRAM(S): 100 INJECTION SUBCUTANEOUS at 00:17

## 2019-05-16 RX ADMIN — NYSTATIN CREAM 1 APPLICATION(S): 100000 CREAM TOPICAL at 05:59

## 2019-05-16 NOTE — PROGRESS NOTE ADULT - SUBJECTIVE AND OBJECTIVE BOX
INTERVAL HPI/OVERNIGHT EVENTS: Placed back on vent. Pulled out NGT this AM which was replaced    SUBJECTIVE: Patient seen and examined at bedside.     CONSTITUTIONAL: No weakness, fevers or chills  EYES/ENT: No visual changes;  No vertigo or throat pain   NECK: No pain or stiffness  RESPIRATORY: No cough, wheezing, hemoptysis; No shortness of breath  CARDIOVASCULAR: No chest pain or palpitations  GASTROINTESTINAL: No abdominal or epigastric pain. No nausea, vomiting, or hematemesis; No diarrhea or constipation. No melena or hematochezia.  GENITOURINARY: No dysuria, frequency or hematuria  NEUROLOGICAL: No numbness or weakness  SKIN: No itching, rashes    OBJECTIVE:    VITAL SIGNS:  ICU Vital Signs Last 24 Hrs  T(C): 37.3 (16 May 2019 01:01), Max: 37.8 (15 May 2019 09:00)  T(F): 99.1 (16 May 2019 01:01), Max: 100 (15 May 2019 09:00)  HR: 75 (16 May 2019 05:29) (68 - 82)  BP: 111/61 (16 May 2019 04:00) (86/49 - 137/77)  BP(mean): 86 (16 May 2019 04:00) (59 - 99)  ABP: --  ABP(mean): --  RR: 11 (16 May 2019 04:00) (11 - 47)  SpO2: 99% (16 May 2019 05:29) (95% - 100%)    Mode: CPAP with PS, FiO2: 40, PEEP: 5, PS: 10, MAP: 7.4, PIP: 17    05-14 @ 07:01  -  05-15 @ 07:00  --------------------------------------------------------  IN: 3129.1 mL / OUT: 2325 mL / NET: 804.1 mL    05-15 @ 07:01  -  05-16 @ 06:22  --------------------------------------------------------  IN: 812.2 mL / OUT: 1310 mL / NET: -497.8 mL      CAPILLARY BLOOD GLUCOSE      POCT Blood Glucose.: 164 mg/dL (16 May 2019 05:51)      PHYSICAL EXAM:    General: NAD, comfortable on CPAP trial   HEENT: NCAT, PERRL, clear conjunctiva, no scleral icterus  Neck: supple, no JVD, R HD cath in place  Respiratory: good air entry bilaterally, Mild rhonchi bilaterally.  Cardiovascular: RRR, normal S1S2, no M/R/G  Vascular: 1+ radial and DP pulses  Abdomen: soft, NT/ND, bowel sounds present, L colostomy w pink stoma, no palpable masses  Extremities: cool to touch, no clubbing, cyanosis, or edema  Skin: No rashes present  Neuro: 1/5 strength in feet, 2/5 in legs b/l, 4/5 strength in upper extremities. No focal deficits     MEDICATIONS:  MEDICATIONS  (STANDING):  albumin human  5% IVPB 2750 milliLiter(s) IV Intermittent once  calcium gluconate IVPB 1 Gram(s) IV Intermittent once  chlorhexidine 2% Cloths 1 Application(s) Topical <User Schedule>  dextrose 5%. 1000 milliLiter(s) (50 mL/Hr) IV Continuous <Continuous>  dextrose 50% Injectable 25 Gram(s) IV Push once  docusate sodium Liquid 100 milliGRAM(s) Oral daily  enoxaparin Injectable 40 milliGRAM(s) SubCutaneous every 24 hours  heparin 1000 Unit(s) 1000 Unit(s) IV Push once  Heparin injectable 1000 units/mL 2000 Unit(s) 2000 Unit(s) IV Push once  Heparin injectable 1000 units/mL 2000 Unit(s) 2000 Unit(s) IV Push once  insulin glargine Injectable (LANTUS) 15 Unit(s) SubCutaneous at bedtime  insulin regular  human corrective regimen sliding scale   SubCutaneous every 6 hours  insulin regular  human recombinant 4 Unit(s) SubCutaneous every 6 hours  magnesium sulfate  IVPB 1 Gram(s) IV Intermittent once  midodrine 5 milliGRAM(s) Oral every 8 hours  norepinephrine Infusion 0.05 MICROgram(s)/kG/Min (5.466 mL/Hr) IV Continuous <Continuous>  nystatin Powder 1 Application(s) Topical three times a day  pantoprazole   Suspension 40 milliGRAM(s) Oral daily  petrolatum Ophthalmic Ointment 1 Application(s) Both EYES three times a day  propofol Infusion 5 MICROgram(s)/kG/Min (1.749 mL/Hr) IV Continuous <Continuous>  senna 2 Tablet(s) Oral at bedtime  tamoxifen 20 milliGRAM(s) Oral <User Schedule>  zinc oxide 20% Ointment 1 Application(s) Topical three times a day    MEDICATIONS  (PRN):  dextrose 40% Gel 15 Gram(s) Oral once PRN Blood Glucose LESS THAN 70 milliGRAM(s)/deciliter  glucagon  Injectable 1 milliGRAM(s) IntraMuscular once PRN Glucose LESS THAN 70 milligrams/deciliter      ALLERGIES:  Allergies    No Known Allergies    Intolerances    IVIG PRODUCT IS PRIGIVEN OR GAMMUNEX (Unknown)      LABS:                        7.5    8.21  )-----------( 157      ( 15 May 2019 20:08 )             23.9     05-16    142  |  98  |  17  ----------------------------<  157<H>  4.0   |  36<H>  |  0.41<L>    Ca    9.5      16 May 2019 05:25  Phos  3.2     05-16  Mg     1.8     05-16    TPro  6.0  /  Alb  4.5  /  TBili  0.6  /  DBili  x   /  AST  See Note  /  ALT  <5<L>  /  AlkPhos  23<L>  05-15    PT/INR - ( 16 May 2019 05:25 )   PT: 12.8 sec;   INR: 1.13          PTT - ( 16 May 2019 05:25 )  PTT:33.6 sec      RADIOLOGY & ADDITIONAL TESTS: Reviewed. INTERVAL HPI/OVERNIGHT EVENTS: Placed back on vent. Pulled out NGT this AM which was replaced    SUBJECTIVE: Patient seen and examined at bedside. Tolerating CPAP trial this morning.     CONSTITUTIONAL: +Weakness, no fever/ chills   EYES/ENT: No visual changes;  No vertigo or throat pain   NECK: No pain or stiffness  RESPIRATORY: No cough, wheezing, hemoptysis; No shortness of breath  CARDIOVASCULAR: No chest pain or palpitations  GASTROINTESTINAL: No abdominal or epigastric pain. No nausea, vomiting, or hematemesis; No diarrhea or constipation. No melena or hematochezia.  GENITOURINARY: No dysuria, frequency or hematuria  NEUROLOGICAL: No numbness or weakness  SKIN: No itching, rashes    OBJECTIVE:    VITAL SIGNS:  ICU Vital Signs Last 24 Hrs  T(C): 37.3 (16 May 2019 01:01), Max: 37.8 (15 May 2019 09:00)  T(F): 99.1 (16 May 2019 01:01), Max: 100 (15 May 2019 09:00)  HR: 75 (16 May 2019 05:29) (68 - 82)  BP: 111/61 (16 May 2019 04:00) (86/49 - 137/77)  BP(mean): 86 (16 May 2019 04:00) (59 - 99)  ABP: --  ABP(mean): --  RR: 11 (16 May 2019 04:00) (11 - 47)  SpO2: 99% (16 May 2019 05:29) (95% - 100%)    Mode: CPAP with PS, FiO2: 40, PEEP: 5, PS: 10, MAP: 7.4, PIP: 17    05-14 @ 07:01  -  05-15 @ 07:00  --------------------------------------------------------  IN: 3129.1 mL / OUT: 2325 mL / NET: 804.1 mL    05-15 @ 07:01  -  05-16 @ 06:22  --------------------------------------------------------  IN: 812.2 mL / OUT: 1310 mL / NET: -497.8 mL      CAPILLARY BLOOD GLUCOSE      POCT Blood Glucose.: 164 mg/dL (16 May 2019 05:51)      PHYSICAL EXAM:    General: NAD, comfortable on CPAP trial   HEENT: NCAT, PERRL, clear conjunctiva, no scleral icterus  Neck: supple, no JVD, R HD cath in place  Respiratory: good air entry bilaterally, Mild rhonchi bilaterally.  Cardiovascular: RRR, normal S1S2, no M/R/G  Vascular: 1+ radial and DP pulses  Abdomen: soft, NT/ND, bowel sounds present, L colostomy w pink stoma, no palpable masses  Extremities: cool to touch, no clubbing, cyanosis, or edema  Skin: No rashes present  Neuro: 1/5 strength in feet, 2/5 in legs b/l, 4/5 strength in upper extremities. No focal deficits     MEDICATIONS:  MEDICATIONS  (STANDING):  albumin human  5% IVPB 2750 milliLiter(s) IV Intermittent once  calcium gluconate IVPB 1 Gram(s) IV Intermittent once  chlorhexidine 2% Cloths 1 Application(s) Topical <User Schedule>  dextrose 5%. 1000 milliLiter(s) (50 mL/Hr) IV Continuous <Continuous>  dextrose 50% Injectable 25 Gram(s) IV Push once  docusate sodium Liquid 100 milliGRAM(s) Oral daily  enoxaparin Injectable 40 milliGRAM(s) SubCutaneous every 24 hours  heparin 1000 Unit(s) 1000 Unit(s) IV Push once  Heparin injectable 1000 units/mL 2000 Unit(s) 2000 Unit(s) IV Push once  Heparin injectable 1000 units/mL 2000 Unit(s) 2000 Unit(s) IV Push once  insulin glargine Injectable (LANTUS) 15 Unit(s) SubCutaneous at bedtime  insulin regular  human corrective regimen sliding scale   SubCutaneous every 6 hours  insulin regular  human recombinant 4 Unit(s) SubCutaneous every 6 hours  magnesium sulfate  IVPB 1 Gram(s) IV Intermittent once  midodrine 5 milliGRAM(s) Oral every 8 hours  norepinephrine Infusion 0.05 MICROgram(s)/kG/Min (5.466 mL/Hr) IV Continuous <Continuous>  nystatin Powder 1 Application(s) Topical three times a day  pantoprazole   Suspension 40 milliGRAM(s) Oral daily  petrolatum Ophthalmic Ointment 1 Application(s) Both EYES three times a day  propofol Infusion 5 MICROgram(s)/kG/Min (1.749 mL/Hr) IV Continuous <Continuous>  senna 2 Tablet(s) Oral at bedtime  tamoxifen 20 milliGRAM(s) Oral <User Schedule>  zinc oxide 20% Ointment 1 Application(s) Topical three times a day    MEDICATIONS  (PRN):  dextrose 40% Gel 15 Gram(s) Oral once PRN Blood Glucose LESS THAN 70 milliGRAM(s)/deciliter  glucagon  Injectable 1 milliGRAM(s) IntraMuscular once PRN Glucose LESS THAN 70 milligrams/deciliter      ALLERGIES:  Allergies    No Known Allergies    Intolerances    IVIG PRODUCT IS PRIGIVEN OR GAMMUNEX (Unknown)      LABS:                        7.5    8.21  )-----------( 157      ( 15 May 2019 20:08 )             23.9     05-16    142  |  98  |  17  ----------------------------<  157<H>  4.0   |  36<H>  |  0.41<L>    Ca    9.5      16 May 2019 05:25  Phos  3.2     05-16  Mg     1.8     05-16    TPro  6.0  /  Alb  4.5  /  TBili  0.6  /  DBili  x   /  AST  See Note  /  ALT  <5<L>  /  AlkPhos  23<L>  05-15    PT/INR - ( 16 May 2019 05:25 )   PT: 12.8 sec;   INR: 1.13          PTT - ( 16 May 2019 05:25 )  PTT:33.6 sec

## 2019-05-16 NOTE — PROGRESS NOTE ADULT - SUBJECTIVE AND OBJECTIVE BOX
GYN Progress Note    PENDING    Vital Signs Last 24 Hrs  T(C): 37.3 (16 May 2019 01:01), Max: 37.8 (15 May 2019 09:00)  T(F): 99.1 (16 May 2019 01:01), Max: 100 (15 May 2019 09:00)  HR: 75 (16 May 2019 05:29) (68 - 82)  BP: 111/61 (16 May 2019 04:00) (86/49 - 137/77)  BP(mean): 86 (16 May 2019 04:00) (59 - 99)  RR: 11 (16 May 2019 04:00) (11 - 47)  SpO2: 99% (16 May 2019 05:29) (95% - 100%)    Physical Exam:  Gen: No Acute Distress  Pulm:   GI: soft, appropriately nontender, nondistended, ostomy - pink with gas/stool in bag  Ext: SCDs in place, wnl, LE strength 1/5, upper extremity 4/5    I&O's Summary    14 May 2019 07:01  -  15 May 2019 07:00  --------------------------------------------------------  IN: 3129.1 mL / OUT: 2325 mL / NET: 804.1 mL    15 May 2019 07:01  -  16 May 2019 06:22  --------------------------------------------------------  IN: 812.2 mL / OUT: 1310 mL / NET: -497.8 mL      MEDICATIONS  (STANDING):  albumin human  5% IVPB 2750 milliLiter(s) IV Intermittent once  calcium gluconate IVPB 1 Gram(s) IV Intermittent once  chlorhexidine 2% Cloths 1 Application(s) Topical <User Schedule>  dextrose 5%. 1000 milliLiter(s) (50 mL/Hr) IV Continuous <Continuous>  dextrose 50% Injectable 25 Gram(s) IV Push once  docusate sodium Liquid 100 milliGRAM(s) Oral daily  enoxaparin Injectable 40 milliGRAM(s) SubCutaneous every 24 hours  heparin 1000 Unit(s) 1000 Unit(s) IV Push once  Heparin injectable 1000 units/mL 2000 Unit(s) 2000 Unit(s) IV Push once  Heparin injectable 1000 units/mL 2000 Unit(s) 2000 Unit(s) IV Push once  insulin glargine Injectable (LANTUS) 15 Unit(s) SubCutaneous at bedtime  insulin regular  human corrective regimen sliding scale   SubCutaneous every 6 hours  insulin regular  human recombinant 4 Unit(s) SubCutaneous every 6 hours  magnesium sulfate  IVPB 1 Gram(s) IV Intermittent once  midodrine 5 milliGRAM(s) Oral every 8 hours  norepinephrine Infusion 0.05 MICROgram(s)/kG/Min (5.466 mL/Hr) IV Continuous <Continuous>  nystatin Powder 1 Application(s) Topical three times a day  pantoprazole   Suspension 40 milliGRAM(s) Oral daily  petrolatum Ophthalmic Ointment 1 Application(s) Both EYES three times a day  propofol Infusion 5 MICROgram(s)/kG/Min (1.749 mL/Hr) IV Continuous <Continuous>  senna 2 Tablet(s) Oral at bedtime  tamoxifen 20 milliGRAM(s) Oral <User Schedule>  zinc oxide 20% Ointment 1 Application(s) Topical three times a day    MEDICATIONS  (PRN):  dextrose 40% Gel 15 Gram(s) Oral once PRN Blood Glucose LESS THAN 70 milliGRAM(s)/deciliter  glucagon  Injectable 1 milliGRAM(s) IntraMuscular once PRN Glucose LESS THAN 70 milligrams/deciliter      LABS:                        7.5    8.21  )-----------( 157      ( 15 May 2019 20:08 )             23.9     05-16    142  |  98  |  17  ----------------------------<  157<H>  4.0   |  36<H>  |  0.41<L>    Ca    9.5      16 May 2019 05:25  Phos  3.2     05-16  Mg     1.8     05-16    TPro  6.0  /  Alb  4.5  /  TBili  0.6  /  DBili  x   /  AST  See Note  /  ALT  <5<L>  /  AlkPhos  23<L>  05-15    PT/INR - ( 16 May 2019 05:25 )   PT: 12.8 sec;   INR: 1.13          PTT - ( 16 May 2019 05:25 )  PTT:33.6 sec GYN Progress Note    assessed at the bedside but being washed.  able to visualize sacral ulcer stage 2. the whole area is red and irritated, looks fungal, skin breakdown at the sacrum.    no acute events overnights. weaning levophed but same dose throughout the night.    Vital Signs Last 24 Hrs  T(C): 37.3 (16 May 2019 01:01), Max: 37.8 (15 May 2019 09:00)  T(F): 99.1 (16 May 2019 01:01), Max: 100 (15 May 2019 09:00)  HR: 75 (16 May 2019 05:29) (68 - 82)  BP: 111/61 (16 May 2019 04:00) (86/49 - 137/77)  BP(mean): 86 (16 May 2019 04:00) (59 - 99)  RR: 11 (16 May 2019 04:00) (11 - 47)  SpO2: 99% (16 May 2019 05:29) (95% - 100%)    I&O's Summary    14 May 2019 07:01  -  15 May 2019 07:00  --------------------------------------------------------  IN: 3129.1 mL / OUT: 2325 mL / NET: 804.1 mL    15 May 2019 07:01  -  16 May 2019 06:22  --------------------------------------------------------  IN: 812.2 mL / OUT: 1310 mL / NET: -497.8 mL      MEDICATIONS  (STANDING):  albumin human  5% IVPB 2750 milliLiter(s) IV Intermittent once  calcium gluconate IVPB 1 Gram(s) IV Intermittent once  chlorhexidine 2% Cloths 1 Application(s) Topical <User Schedule>  dextrose 5%. 1000 milliLiter(s) (50 mL/Hr) IV Continuous <Continuous>  dextrose 50% Injectable 25 Gram(s) IV Push once  docusate sodium Liquid 100 milliGRAM(s) Oral daily  enoxaparin Injectable 40 milliGRAM(s) SubCutaneous every 24 hours  heparin 1000 Unit(s) 1000 Unit(s) IV Push once  Heparin injectable 1000 units/mL 2000 Unit(s) 2000 Unit(s) IV Push once  Heparin injectable 1000 units/mL 2000 Unit(s) 2000 Unit(s) IV Push once  insulin glargine Injectable (LANTUS) 15 Unit(s) SubCutaneous at bedtime  insulin regular  human corrective regimen sliding scale   SubCutaneous every 6 hours  insulin regular  human recombinant 4 Unit(s) SubCutaneous every 6 hours  magnesium sulfate  IVPB 1 Gram(s) IV Intermittent once  midodrine 5 milliGRAM(s) Oral every 8 hours  norepinephrine Infusion 0.05 MICROgram(s)/kG/Min (5.466 mL/Hr) IV Continuous <Continuous>  nystatin Powder 1 Application(s) Topical three times a day  pantoprazole   Suspension 40 milliGRAM(s) Oral daily  petrolatum Ophthalmic Ointment 1 Application(s) Both EYES three times a day  propofol Infusion 5 MICROgram(s)/kG/Min (1.749 mL/Hr) IV Continuous <Continuous>  senna 2 Tablet(s) Oral at bedtime  tamoxifen 20 milliGRAM(s) Oral <User Schedule>  zinc oxide 20% Ointment 1 Application(s) Topical three times a day    MEDICATIONS  (PRN):  dextrose 40% Gel 15 Gram(s) Oral once PRN Blood Glucose LESS THAN 70 milliGRAM(s)/deciliter  glucagon  Injectable 1 milliGRAM(s) IntraMuscular once PRN Glucose LESS THAN 70 milligrams/deciliter      LABS:                        7.5    8.21  )-----------( 157      ( 15 May 2019 20:08 )             23.9     05-16    142  |  98  |  17  ----------------------------<  157<H>  4.0   |  36<H>  |  0.41<L>    Ca    9.5      16 May 2019 05:25  Phos  3.2     05-16  Mg     1.8     05-16    TPro  6.0  /  Alb  4.5  /  TBili  0.6  /  DBili  x   /  AST  See Note  /  ALT  <5<L>  /  AlkPhos  23<L>  05-15    PT/INR - ( 16 May 2019 05:25 )   PT: 12.8 sec;   INR: 1.13          PTT - ( 16 May 2019 05:25 )  PTT:33.6 sec

## 2019-05-16 NOTE — PROGRESS NOTE ADULT - ATTENDING COMMENTS
Patient with extensive medical history, with CIDP, on PLX.  Exam unchanged.  Next PLX tomorrow.   - cont current management    - follow labs   - Imaging when stable

## 2019-05-16 NOTE — PROGRESS NOTE ADULT - ASSESSMENT
60yo F PMHx polio, breast cancer, DM, Endometrial Cancer s/p exploratory laparotomy, enterolysis, SHAMIR, BSO, pelvic lymphadenectomy, low anterior resection mobilization of splenic flexure, end colostomy on 7/30/18, rectovaginal fistula, numerous admissions for urinary tract infection presented to [facility] in the setting of urosepsis. Hospital course complicated by blood stream infection (currently on ertapenem) and respiratory failure requiring intubation s/p extubation on 5/2. Neurology consulted for worsening lower extremity weakness. On prior admission EMG raises suspicion for CIDP-spectrum disorder. Per collateral information patient became noticeably weak mala in her hands for approx 1 week prior to being diagnosed with IDP in April, suggesting a more acute process. 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 s/p four days of IVIG 500mg/kg on 5/7 - 5/10. Now receiving plasma exchange therapy  Recommend  - plasma exhange yesterday, will undergo 3rd of 5 rounds tomorrow, please continue Q48h for 5x total (10days)  - MG antibodies negative  - Pending MR brain w/ and w/o con & MR cervical spine w/ and w/o con once stable   Neurology will follow

## 2019-05-16 NOTE — PROGRESS NOTE ADULT - ASSESSMENT
PENDING    58yo F HD17 with stage IV endometrial adenocarcinoma s/p staging surgery '18 and 1 round of chemotherapy 2/19 readmitted from [facility] with fever, previously admitted for management of symptomatic rectovaginal and enterovaginal fistulas. Presents on referral from [facility] with urosepsis. Rapid response called due to worsening respiratory status s/p intubation and MICU admission. She then was on regional GYN service however had respiratory weakness and was transferred to Medicine/tele HD9 until requiring intubation again HD10 and is now in MICU. 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 She has been on plasmapheresis. She as re-intubated on 5/13 due to respiratory distress. She was made DNR/DNI 5/14.   Management per MICU.    1. Neuro: weakness secondary Guillain [place] versus exacerbation of AIDP- now status post IVIG x4 day course. Neurology following-appreciate recs. Patient has likely failed IVIG treatment given current status. Now undergoing plasmapheresis. CSF culture no organisms seen, MRI when stable, original plan per neurology will treat with IVIG x2 days every 2wks.  2. Pulm: Re-intubated 5/13. Extubated 5/10-transitioned to BiPAP initially, then on nasal canula 4L. re-intubated due to respiratory distress. now DNR/DNI.   3. Cardio: again on pressor support. Appropriate urine output   4. FEN/GI: Dobhoff in situ, on tube feeds. repleting electrotyes prn  5. : Adequate urine output, joseph in place.  - Joseph replaced 5/8 after finding of overflow incontinence, f/u urology recs regarding suprapubic catheter   6. ID: s/p ertapenem ended 5/14, s/p meropenem, s/p vanc, appreciate ID recs. febrile 5/13. has low grade fevers 100.  7. Endocrine: FS, ISS, Lantus 8u qhs started 5/14, Metformin 1000mg BID held at this time   8. VTE prophylaxis - SCDs, Lovenox 40mg daily   9. GYN malignancy  -Stopped anastrazole and initiated 3 weeks of megace 80mg BID followed by 3 weeks of tamoxifen.   - Started megace and metformin 4/10, now on Tamoxifen for 21 days (5/1/19-  Compound version given. Currently being held.  -  Avoid immunotherapy at this time given associated risk of autoimmune disease   10. Derm: monitor sacrum for s/s of pressure ulcer, now stage 2  11. PT/OT needs evaluation, OOB with nursing staff  12. Social work/palliative: DNR/DNI 60yo F HD17 with stage IV endometrial adenocarcinoma s/p staging surgery '18 and 1 round of chemotherapy 2/19 readmitted from [facility] with fever, previously admitted for management of symptomatic rectovaginal and enterovaginal fistulas. Presents on referral from [facility] with urosepsis. Rapid response called due to worsening respiratory status s/p intubation and MICU admission. She then was on regional GYN service however had respiratory weakness and was transferred to Medicine/tele HD9 until requiring intubation again HD10 and is now in MICU. 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 She has been on plasmapheresis. She as re-intubated on 5/13 due to respiratory distress. She was made DNR/DNI 5/14.   Management per MICU.    1. Neuro: weakness secondary Guillain [place] versus exacerbation of AIDP- now status post IVIG x4 day course. Neurology following-appreciate recs. Patient has likely failed IVIG treatment given current status. Now undergoing plasmapheresis. CSF culture no organisms seen, MRI when stable, original plan per neurology will treat with IVIG x2 days every 2wks.  2. Pulm: Re-intubated 5/13. Extubated 5/10-transitioned to BiPAP initially, then on nasal canula 4L. re-intubated due to respiratory distress. now DNR/DNI.   3. Cardio: again on pressor support. Appropriate urine output   4. FEN/GI: Dobhoff in situ, on tube feeds. repleting electrotyes prn  5. : Adequate urine output, joseph in place.  - Joseph replaced 5/8 after finding of overflow incontinence, f/u urology recs regarding suprapubic catheter   6. ID: s/p ertapenem ended 5/14, s/p meropenem, s/p vanc, appreciate ID recs. febrile 5/13. has low grade fevers 100.  7. Endocrine: FS, ISS, Lantus 8u qhs started 5/14, Metformin 1000mg BID held at this time   8. VTE prophylaxis - SCDs, Lovenox 40mg daily   9. GYN malignancy  -Stopped anastrazole and initiated 3 weeks of megace 80mg BID followed by 3 weeks of tamoxifen.   - Started megace and metformin 4/10, now on Tamoxifen for 21 days (5/1/19-  Compound version given. Currently being held.  -  Avoid immunotherapy at this time given associated risk of autoimmune disease   10. Derm: monitor sacrum for s/s of pressure ulcer, now stage 2  11. PT/OT needs evaluation, OOB with nursing staff  12. Social work/palliative: DNR/DNI

## 2019-05-16 NOTE — PROGRESS NOTE ADULT - SUBJECTIVE AND OBJECTIVE BOX
NEUROLOGY CONSULT PROGRESS NOTE  Patient now s/p 2x plasma exchange treatment. Overnight was restarted on propofol and midodrine, but still on levophed this AM.     INTERVAL HISTORY:      REVIEW OF SYSTEMS:  As per HPI, otherwise negative for Constitutional, Eyes, Ears/Nose/Mouth/Throat, Neck, Cardiovascular, Respiratory, Gastrointestinal, Genitourinary, Skin, Endocrine, Musculoskeletal, Psychiatric, and Hematologic/Lymphatic.    MEDICATIONS:  albumin human  5% IVPB 2750 milliLiter(s) IV Intermittent once  calcium gluconate IVPB 1 Gram(s) IV Intermittent once  chlorhexidine 2% Cloths 1 Application(s) Topical <User Schedule>  dextrose 40% Gel 15 Gram(s) Oral once PRN  dextrose 5%. 1000 milliLiter(s) IV Continuous <Continuous>  dextrose 50% Injectable 25 Gram(s) IV Push once  docusate sodium Liquid 100 milliGRAM(s) Oral daily  enoxaparin Injectable 40 milliGRAM(s) SubCutaneous every 24 hours  glucagon  Injectable 1 milliGRAM(s) IntraMuscular once PRN  heparin 1000 Unit(s) 1000 Unit(s) IV Push once  Heparin injectable 1000 units/mL 2000 Unit(s) 2000 Unit(s) IV Push once  Heparin injectable 1000 units/mL 2000 Unit(s) 2000 Unit(s) IV Push once  insulin glargine Injectable (LANTUS) 15 Unit(s) SubCutaneous at bedtime  insulin regular  human corrective regimen sliding scale   SubCutaneous every 6 hours  insulin regular  human recombinant 4 Unit(s) SubCutaneous every 6 hours  magnesium sulfate  IVPB 1 Gram(s) IV Intermittent once  midodrine 5 milliGRAM(s) Oral every 8 hours  norepinephrine Infusion 0.05 MICROgram(s)/kG/Min IV Continuous <Continuous>  nystatin Powder 1 Application(s) Topical three times a day  pantoprazole   Suspension 40 milliGRAM(s) Oral daily  petrolatum Ophthalmic Ointment 1 Application(s) Both EYES three times a day  propofol Infusion 5 MICROgram(s)/kG/Min IV Continuous <Continuous>  senna 2 Tablet(s) Oral at bedtime  tamoxifen 20 milliGRAM(s) Oral <User Schedule>  zinc oxide 20% Ointment 1 Application(s) Topical three times a day    VITAL SIGNS:  Vital Signs Last 24 Hrs  T(C): 37.3 (16 May 2019 01:01), Max: 37.8 (15 May 2019 09:00)  T(F): 99.1 (16 May 2019 01:01), Max: 100 (15 May 2019 09:00)  HR: 78 (16 May 2019 06:00) (68 - 82)  BP: 104/59 (16 May 2019 06:00) (86/49 - 137/77)  BP(mean): 76 (16 May 2019 06:00) (59 - 99)  RR: 21 (16 May 2019 06:00) (11 - 47)  SpO2: 100% (16 May 2019 06:00) (95% - 100%)    PHYSICAL EXAMINATION:  Constitutional: Frail middle age female, intubated, NG tube in place  Eyes: Conjunctiva and sclera clear  Neurologic:  - Mental Status:  Awake and alert, able to follow commands as directed  - Cranial Nerves II-XII:    II: Pupils are equal, round, and reactive to light.  III, IV, VI:  EOMI  V:  Facial sensation is intact  VII:  Strong eye closure   VIII:  Hearing is intact to finger rub.  XI:  Weak shoulder shrug, able to move head  - Motor: Hypotrophic muscles. Upper extremities 4-/5 Lower extremities Leg 2/5 bilaterally, Ankle 0/5  - Reflexes:  0/5 reflexes at biceps, triceps, brachioradialis, knees, and ankles. No babinski response.   - Sensory:  Intact to light touch  - Gait: Deferred    LABS:                          7.5    7.33  )-----------( 295      ( 16 May 2019 05:25 )             23.8     05-16    142  |  98  |  17  ----------------------------<  157<H>  4.0   |  36<H>  |  0.41<L>    Ca    9.5      16 May 2019 05:25  Phos  3.2     05-16  Mg     1.8     05-16    TPro  6.0  /  Alb  4.5  /  TBili  0.6  /  DBili  x   /  AST  See Note  /  ALT  <5<L>  /  AlkPhos  23<L>  05-15    PT/INR - ( 16 May 2019 05:25 )   PT: 12.8 sec;   INR: 1.13          PTT - ( 16 May 2019 05:25 )  PTT:33.6 sec      RADIOLOGY & ADDITIONAL STUDIES:      IMPRESSION & RECOMMENDATIONS: NEUROLOGY CONSULT PROGRESS NOTE  Patient now s/p 2x plasma exchange treatment. Overnight was restarted on propofol and midodrine, but still on levophed this AM.     INTERVAL HISTORY:      REVIEW OF SYSTEMS:  As per HPI, otherwise negative for Constitutional, Eyes, Ears/Nose/Mouth/Throat, Neck, Cardiovascular, Respiratory, Gastrointestinal, Genitourinary, Skin, Endocrine, Musculoskeletal, Psychiatric, and Hematologic/Lymphatic.    MEDICATIONS:  albumin human  5% IVPB 2750 milliLiter(s) IV Intermittent once  calcium gluconate IVPB 1 Gram(s) IV Intermittent once  chlorhexidine 2% Cloths 1 Application(s) Topical <User Schedule>  dextrose 40% Gel 15 Gram(s) Oral once PRN  dextrose 5%. 1000 milliLiter(s) IV Continuous <Continuous>  dextrose 50% Injectable 25 Gram(s) IV Push once  docusate sodium Liquid 100 milliGRAM(s) Oral daily  enoxaparin Injectable 40 milliGRAM(s) SubCutaneous every 24 hours  glucagon  Injectable 1 milliGRAM(s) IntraMuscular once PRN  heparin 1000 Unit(s) 1000 Unit(s) IV Push once  Heparin injectable 1000 units/mL 2000 Unit(s) 2000 Unit(s) IV Push once  Heparin injectable 1000 units/mL 2000 Unit(s) 2000 Unit(s) IV Push once  insulin glargine Injectable (LANTUS) 15 Unit(s) SubCutaneous at bedtime  insulin regular  human corrective regimen sliding scale   SubCutaneous every 6 hours  insulin regular  human recombinant 4 Unit(s) SubCutaneous every 6 hours  magnesium sulfate  IVPB 1 Gram(s) IV Intermittent once  midodrine 5 milliGRAM(s) Oral every 8 hours  norepinephrine Infusion 0.05 MICROgram(s)/kG/Min IV Continuous <Continuous>  nystatin Powder 1 Application(s) Topical three times a day  pantoprazole   Suspension 40 milliGRAM(s) Oral daily  petrolatum Ophthalmic Ointment 1 Application(s) Both EYES three times a day  propofol Infusion 5 MICROgram(s)/kG/Min IV Continuous <Continuous>  senna 2 Tablet(s) Oral at bedtime  tamoxifen 20 milliGRAM(s) Oral <User Schedule>  zinc oxide 20% Ointment 1 Application(s) Topical three times a day    VITAL SIGNS:  Vital Signs Last 24 Hrs  T(C): 37.3 (16 May 2019 01:01), Max: 37.8 (15 May 2019 09:00)  T(F): 99.1 (16 May 2019 01:01), Max: 100 (15 May 2019 09:00)  HR: 78 (16 May 2019 06:00) (68 - 82)  BP: 104/59 (16 May 2019 06:00) (86/49 - 137/77)  BP(mean): 76 (16 May 2019 06:00) (59 - 99)  RR: 21 (16 May 2019 06:00) (11 - 47)  SpO2: 100% (16 May 2019 06:00) (95% - 100%)    PHYSICAL EXAMINATION:  Constitutional: Frail middle age female, intubated, NG tube in place  Eyes: Conjunctiva and sclera clear  Neurologic:  - Mental Status:  Awake and alert, able to follow commands as directed  - Cranial Nerves II-XII:    II: Pupils are equal, round, and reactive to light.  III, IV, VI:  EOMI  V:  Facial sensation is intact  VII:  Strong eye closure   VIII:  Hearing is intact to finger rub.  XI:  Weak shoulder shrug, able to move head  - Motor: Hypotrophic muscles. Upper extremities 4+/5 Lower extremities Leg 2/5 bilaterally, Ankle 0/5  - Reflexes:  0/5 reflexes at biceps, triceps, brachioradialis, knees, and ankles. No babinski response.   - Sensory:  Intact to light touch  - Gait: Deferred    LABS:                          7.5    7.33  )-----------( 295      ( 16 May 2019 05:25 )             23.8     05-16    142  |  98  |  17  ----------------------------<  157<H>  4.0   |  36<H>  |  0.41<L>    Ca    9.5      16 May 2019 05:25  Phos  3.2     05-16  Mg     1.8     05-16    TPro  6.0  /  Alb  4.5  /  TBili  0.6  /  DBili  x   /  AST  See Note  /  ALT  <5<L>  /  AlkPhos  23<L>  05-15    PT/INR - ( 16 May 2019 05:25 )   PT: 12.8 sec;   INR: 1.13          PTT - ( 16 May 2019 05:25 )  PTT:33.6 sec      RADIOLOGY & ADDITIONAL STUDIES:      IMPRESSION & RECOMMENDATIONS:

## 2019-05-16 NOTE — PROGRESS NOTE ADULT - SUBJECTIVE AND OBJECTIVE BOX
Patient seen at the bedside. Awake but lethargic.    PE: VSS    chest: diffuse rhonchi on anterioir ausculation   Cor: reg s1S2    Ext: SCD in place   Labs reviewed  TPE ordered for tomorrow (#3/5)

## 2019-05-16 NOTE — PROGRESS NOTE ADULT - SUBJECTIVE AND OBJECTIVE BOX
TIM MCDANIEL             MRN-[id number]    CC:  weakness, [facility]    HPI:  58 yo G0 w/ known stage IV endometrial carcinoma s/p staging surgery 7/2018 and 1 cycle of chemotherapy in 2/2019 followed by multiple admissions for management of symptomatic enterovaginal fistula, complex fistula associated urinary tract infection, bacteremia presents on referral from [facility] after fever developing fever.  Found to have severe sepsis, required intubation and treatment of UTI,.  Initially improved but then declined neurologically and required intubation for hypercapnic respiratory failure.  Was extubated and did well for several days.    SUBJECTIVE:  Patient reintubated 5/13 secondary to profound weakness.  Became much more awake when hypercapnia corrected. Has undergone 3 rounds of plasmapheresis and Today reports feeling stronger.  She was very clear on rounds  talking to MICU team that she would not allow tracheostomy.  Plan now is to treat her and get her strong enough to come off the vent- but not to reintubate.  Spoke with sister and sister in law at length who understand and respect patient's wishes.    Patient admits she feels stronger in shoulders and arms.  Shows me that she has more had control.  Denies pain.      ROS:  DYSPNEA: No  NAUS/VOM: No  SECRETIONS: Yes, some difficulty clearing secretions	  AGITATION: No  Pain (Y/N): No  -Provocation/Palliation:  -Quality/Quantity:  -Radiating:  -Severity:  -Timing/Frequency:  -Impact on ADLs:    OTHER REVIEW OF SYSTEMS: no other complaints      PEx:  Vital Signs Last 24 Hrs  Vital Signs Last 24 Hrs  T(C): 37 (16 May 2019 10:00), Max: 37.6 (16 May 2019 07:00)  T(F): 98.6 (16 May 2019 10:00), Max: 99.6 (16 May 2019 07:00)  HR: 68 (16 May 2019 12:10) (64 - 84)  BP: 111/58 (16 May 2019 10:00) (86/49 - 133/66)  BP(mean): 77 (16 May 2019 10:00) (59 - 96)  RR: 19 (16 May 2019 12:10) (11 - 47)  SpO2: 100% (16 May 2019 12:10) (95% - 100%)    General: frail female, chronically ill appearing  comfortable in bed pn her side; appearing older than stated age, pale  HEENT: moderate alopecia; conjunctival pallor;  orally intubated, NGT in place  Neck: supple, but appears stronger with more head control.  Able to shake head "yes" and "no"  CVS: S1/S2, RRR  Resp: on ventilator  GI: soft, non-tender  Musc: hypotrophic muscularity and bulk, particularly of UE/hands, LE; profound kyphoscoliosis muscle wasting of left trapezius and latissimus dorsi  Neuro: awake and alert; shrugs shoulders moderate neck strength, squeezes my hand  Psych: depressed mood, euthymic affect; pleasant  Skin: intact	     ALLERGIES: IVIG PRODUCT IS PRIGIVEN OR GAMMUNEX (Unknown)  No Known Allergies    OPIATE NAÏVE (Y/N): Yes on presentation, had received opiates earlier in admission    MEDICATIONS: REVIEWED  MEDICATIONS  (STANDING):  albumin human  5% IVPB 2750 milliLiter(s) IV Intermittent once  chlorhexidine 0.12% Liquid 15 milliLiter(s) Oral Mucosa every 12 hours  chlorhexidine 2% Cloths 1 Application(s) Topical <User Schedule>  dextrose 5%. 1000 milliLiter(s) (50 mL/Hr) IV Continuous <Continuous>  dextrose 50% Injectable 25 Gram(s) IV Push once  docusate sodium Liquid 100 milliGRAM(s) Oral daily  enoxaparin Injectable 40 milliGRAM(s) SubCutaneous every 24 hours  heparin 1000 Unit(s) 1000 Unit(s) IV Push once  Heparin injectable 1000 units/mL 2000 Unit(s) 2000 Unit(s) IV Push once  Heparin injectable 1000 units/mL 2000 Unit(s) 2000 Unit(s) IV Push once  insulin glargine Injectable (LANTUS) 15 Unit(s) SubCutaneous at bedtime  insulin regular  human corrective regimen sliding scale   SubCutaneous every 6 hours  insulin regular  human recombinant 4 Unit(s) SubCutaneous every 6 hours  midodrine 10 milliGRAM(s) Oral every 8 hours  norepinephrine Infusion 0.05 MICROgram(s)/kG/Min (5.466 mL/Hr) IV Continuous <Continuous>  nystatin Powder 1 Application(s) Topical three times a day  pantoprazole   Suspension 40 milliGRAM(s) Oral daily  petrolatum Ophthalmic Ointment 1 Application(s) Both EYES three times a day  propofol Infusion 5 MICROgram(s)/kG/Min (1.749 mL/Hr) IV Continuous <Continuous>  senna 2 Tablet(s) Oral at bedtime  tamoxifen 20 milliGRAM(s) Oral <User Schedule>  zinc oxide 20% Ointment 1 Application(s) Topical three times a day    MEDICATIONS  (PRN):  dextrose 40% Gel 15 Gram(s) Oral once PRN Blood Glucose LESS THAN 70 milliGRAM(s)/deciliter  glucagon  Injectable 1 milliGRAM(s) IntraMuscular once PRN Glucose LESS THAN 70 milligrams/deciliter            LABS: REVIEWED                                   7.5    7.33  )-----------( 295      ( 16 May 2019 05:25 )             23.8   05-16    142  |  98  |  17  ----------------------------<  157<H>  4.0   |  36<H>  |  0.41<L>    Ca    9.5      16 May 2019 05:25  Phos  3.2     05-16  Mg     1.8     05-16    TPro  6.0  /  Alb  4.5  /  TBili  0.6  /  DBili  x   /  AST  See Note  /  ALT  <5<L>  /  AlkPhos  23<L>  05-15                                    IMAGING: REVIEWED    ADVANCED DIRECTIVES:     see note below, now DNR/DNI per patient's wishes, not to be reintubated    DECISION MAKER: patient/self  LEGAL SURROGATE: Esha Avila (sister) [phone]    PSYCHOSOCIAL-SPIRITUAL ASSESSMENT:       Reviewed       Care plan as below	    GOALS OF CARE DISCUSSION       Palliative care info/counseling provided previously.  Awaiting arrival of Esha roblero	           Family meeting       Advanced Directives addressed please see Advance Care Planning Note	           See previous Palliative Medicine Notes       Documentation of GOC: Agrees with efforts to treat, but does not want trach and once off vent would refuse to be reintubated  	      AGENCY CHOICE DISCUSSED:     Too acute to discuss    REFERRALS	        Palliative Med        Unit SW/Case Mgmt        - declined       Speech/Swallow - failed dysphagia; has NGT currently       Patient/Family Support       Massage Therapy       Music Therapy       Hospice - not applicable at present       Nutrition       PT/OT

## 2019-05-16 NOTE — PROGRESS NOTE ADULT - ASSESSMENT
58 yo F PMHx polio, breast cancer, DM, Endometrial Cancer s/p exploratory laparotomy, enterolysis, SHAMIR, BSO, pelvic lymphadenectomy, low anterior resection mobilization of splenic flexure, end colostomy on 7/30/18, rectovaginal fistula, numerous admissions for urinary tract infection presented to [facility] in the setting of urosepsis. Hospital course complicated by blood stream infection (currently on ertapenem) and respiratory failure requiring intubation s/p extubation on 5/2. Intubated urgently on 7 lachman for CO2 narcosis in setting of AIDP and stepped up to MICU, initially improving s/p IVIG and extubated to bipap on 5/10, reintubated for CO2 narcosis on 5/14 and started on plasmapheresis     CARDIOVASCULAR  #Hemodynamics  Stable  - making appropriate amounts of urine, mentating well  - Downtitrating levophed this morning and started on midodrine 5mg TID    PULMONARY  #Acute hypercapnic respiratory failure   2/2 to demyelinating polyneuropathy (AIDP), patient extubated to bipap on 5/10, reintubated on 5/14 for CO2 narcosis  - s/p 4 doses of IVIG finished 5/11 and started on plasmapharesis on 5/13. 2nd round today. Will receive every other day for a total of 5 doses   - appreciate neurology recs  - appreciate transfusion medicine recs  - scopolamine patch placed on 5/13      #Hx of urinary retention  - retaining on 5/8, so placed joseph  - might need SP catheter  - appreciate urology recs    Neurology  #AIDP  Patient w AIDP leading to respiratory compromise, previous EMG w demyelinating polyneuropathy  - recs per neurology  - s/p 4 rounds of IVIG, with dramatic improvement in strength and respiratory status, however decompensated on 5/13 and reintubated  - f/u MRI brain w/wo contrast and MR cervical spine w/wo contrast when patient stable to go for imaging  - plasmapheresis as above under pulmonary    INFECTIOUS DISEASE  #hx of recurrent ESBL ecoli UTI and bacteremia  - previously on admission patient admitted to MICU in septic shock 2/2 to ESBL ecoli bacteremia and UTI  - Completed tx w/ ertapenem on 5/14     HEME/ONC  #Stage IV endometrial adenocarcinoma  Per GYN-ONC w/ interval increase in tumor burden. Pt was treated with Anastrazole and initiated 3 weeks of megace 80mg BID followed by 3 weeks of tamoxifen. started megace and metformin 4/10, now off megace and metformin  - c/w Tamoxifen for 21 days (5/1/19- )  - GYN-ONC on board, follow recs    FLUIDS/ELECTROLYTES/NUTRITION  -IVF: none  -Monitor, Replete to K>4 and Mg>2  -Diet: continue NGT feeds for now    PROPHYLAXIS  -DVT: Lovenox and SCDs  -GI: none as on feeds    DISPO: MICU    CODE STATUS: DNR/DNI, palliative following 60 yo F PMHx polio, breast cancer, DM, Endometrial Cancer s/p exploratory laparotomy, enterolysis, SHAMIR, BSO, pelvic lymphadenectomy, low anterior resection mobilization of splenic flexure, end colostomy on 7/30/18, rectovaginal fistula, numerous admissions for urinary tract infection presented to [facility] in the setting of urosepsis. Hospital course complicated by blood stream infection (currently on ertapenem) and respiratory failure requiring intubation s/p extubation on 5/2. Intubated urgently on 7 lachman for CO2 narcosis in setting of AIDP and stepped up to MICU, initially improving s/p IVIG and extubated to bipap on 5/10, reintubated for CO2 narcosis on 5/14 and started on plasmapheresis     CARDIOVASCULAR  #Hemodynamics  Stable  - making appropriate amounts of urine, mentating well  - Downtitrating levophed today. Uptitrated midodrine to 10mg TID     PULMONARY  #Acute hypercapnic respiratory failure   2/2 to demyelinating polyneuropathy (AIDP), patient extubated to bipap on 5/10, reintubated on 5/14 for CO2 narcosis  - s/p 4 doses of IVIG finished 5/11 and started on plasmapharesis on 5/13. 3rd round tomorrow. Will receive every other day for a total of 5 doses   - appreciate neurology recs  - appreciate transfusion medicine recs  - scopolamine patch placed on 5/13      #Hx of urinary retention  - Oliguric and is retaining  - Straight cath PRN    Neurology  #AIDP  Patient w AIDP leading to respiratory compromise, previous EMG w demyelinating polyneuropathy  - recs per neurology  - s/p 4 rounds of IVIG, with dramatic improvement in strength and respiratory status, however decompensated on 5/13 and reintubated  - f/u MRI brain w/wo contrast and MR cervical spine w/wo contrast when patient stable to go for imaging  - plasmapheresis as above under pulmonary    INFECTIOUS DISEASE  #hx of recurrent ESBL ecoli UTI and bacteremia  - previously on admission patient admitted to MICU in septic shock 2/2 to ESBL ecoli bacteremia and UTI  - Completed tx w/ ertapenem on 5/14     HEME/ONC  #Stage IV endometrial adenocarcinoma  Per GYN-ONC w/ interval increase in tumor burden. Pt was treated with Anastrazole and initiated 3 weeks of megace 80mg BID followed by 3 weeks of tamoxifen. started megace and metformin 4/10, now off megace and metformin  - c/w Tamoxifen for 21 days (5/1/19- )  - GYN-ONC on board, follow recs    FLUIDS/ELECTROLYTES/NUTRITION  -IVF: none  -Monitor, Replete to K>4 and Mg>2  -Diet: continue NGT feeds for now    PROPHYLAXIS  -DVT: Lovenox and SCDs  -GI: none as on feeds    DISPO: MICU    CODE STATUS: DNR/DNI, palliative following 58 yo F PMHx polio, breast cancer, DM, Endometrial Cancer s/p exploratory laparotomy, enterolysis, SHAMIR, BSO, pelvic lymphadenectomy, low anterior resection mobilization of splenic flexure, end colostomy on 7/30/18, rectovaginal fistula, numerous admissions for urinary tract infection presented to [facility] in the setting of urosepsis. Hospital course complicated by blood stream infection (currently on ertapenem) and respiratory failure requiring intubation s/p extubation on 5/2. Intubated urgently on 7 lachman for CO2 narcosis in setting of AIDP and stepped up to MICU, initially improving s/p IVIG and extubated to bipap on 5/10, reintubated for CO2 narcosis on 5/14 and started on plasmapheresis     CARDIOVASCULAR  #Hemodynamics  Stable  - making appropriate amounts of urine, mentating well  - Downtitrating levophed today. Uptitrated midodrine to 10mg TID     PULMONARY  #Acute hypercapnic respiratory failure   2/2 to demyelinating polyneuropathy (AIDP), patient extubated to bipap on 5/10, reintubated on 5/14 for CO2 narcosis  - s/p 4 doses of IVIG finished 5/11 and started on plasmapharesis on 5/13. 3rd round tomorrow. Will receive every other day for a total of 5 doses   - appreciate neurology recs  - appreciate transfusion medicine recs  - scopolamine patch placed on 5/13      #Hx of urinary retention  - retaining on 5/8, so placed joseph  - might need SP catheter  - appreciate urology recs    Neurology  #AIDP  Patient w AIDP leading to respiratory compromise, previous EMG w demyelinating polyneuropathy  - recs per neurology  - s/p 4 rounds of IVIG, with dramatic improvement in strength and respiratory status, however decompensated on 5/13 and reintubated  - f/u MRI brain w/wo contrast and MR cervical spine w/wo contrast when patient stable to go for imaging  - plasmapheresis as above under pulmonary    INFECTIOUS DISEASE  #hx of recurrent ESBL ecoli UTI and bacteremia  - previously on admission patient admitted to MICU in septic shock 2/2 to ESBL ecoli bacteremia and UTI  - Completed tx w/ ertapenem on 5/14     HEME/ONC  #Stage IV endometrial adenocarcinoma  Per GYN-ONC w/ interval increase in tumor burden. Pt was treated with Anastrazole and initiated 3 weeks of megace 80mg BID followed by 3 weeks of tamoxifen. started megace and metformin 4/10, now off megace and metformin  - c/w Tamoxifen for 21 days (5/1/19- )  - GYN-ONC on board, follow recs    FLUIDS/ELECTROLYTES/NUTRITION  -IVF: none  -Monitor, Replete to K>4 and Mg>2  -Diet: continue NGT feeds for now    PROPHYLAXIS  -DVT: Lovenox and SCDs  -GI: none as on feeds    DISPO: MICU    CODE STATUS: DNR/DNI, palliative following

## 2019-05-17 LAB
ANION GAP SERPL CALC-SCNC: 10 MMOL/L — SIGNIFICANT CHANGE UP (ref 5–17)
APPEARANCE UR: ABNORMAL
BILIRUB UR-MCNC: NEGATIVE — SIGNIFICANT CHANGE UP
BUN SERPL-MCNC: 20 MG/DL — SIGNIFICANT CHANGE UP (ref 7–23)
CALCIUM SERPL-MCNC: 9.5 MG/DL — SIGNIFICANT CHANGE UP (ref 8.4–10.5)
CHLORIDE SERPL-SCNC: 102 MMOL/L — SIGNIFICANT CHANGE UP (ref 96–108)
CO2 SERPL-SCNC: 30 MMOL/L — SIGNIFICANT CHANGE UP (ref 22–31)
COLOR SPEC: YELLOW — SIGNIFICANT CHANGE UP
CREAT SERPL-MCNC: 0.45 MG/DL — LOW (ref 0.5–1.3)
DIFF PNL FLD: ABNORMAL
GLUCOSE BLDC GLUCOMTR-MCNC: 113 MG/DL — HIGH (ref 70–99)
GLUCOSE BLDC GLUCOMTR-MCNC: 120 MG/DL — HIGH (ref 70–99)
GLUCOSE BLDC GLUCOMTR-MCNC: 168 MG/DL — HIGH (ref 70–99)
GLUCOSE BLDC GLUCOMTR-MCNC: 182 MG/DL — HIGH (ref 70–99)
GLUCOSE BLDC GLUCOMTR-MCNC: 193 MG/DL — HIGH (ref 70–99)
GLUCOSE SERPL-MCNC: 202 MG/DL — HIGH (ref 70–99)
GLUCOSE UR QL: NEGATIVE — SIGNIFICANT CHANGE UP
HCT VFR BLD CALC: 22.1 % — LOW (ref 34.5–45)
HCT VFR BLD CALC: 23.2 % — LOW (ref 34.5–45)
HGB BLD-MCNC: 7 G/DL — CRITICAL LOW (ref 11.5–15.5)
HGB BLD-MCNC: 7.2 G/DL — LOW (ref 11.5–15.5)
KETONES UR-MCNC: NEGATIVE — SIGNIFICANT CHANGE UP
LACTATE SERPL-SCNC: 0.9 MMOL/L — SIGNIFICANT CHANGE UP (ref 0.5–2)
LEUKOCYTE ESTERASE UR-ACNC: ABNORMAL
MAGNESIUM SERPL-MCNC: 2.1 MG/DL — SIGNIFICANT CHANGE UP (ref 1.6–2.6)
MCHC RBC-ENTMCNC: 28.8 PG — SIGNIFICANT CHANGE UP (ref 27–34)
MCHC RBC-ENTMCNC: 29.5 PG — SIGNIFICANT CHANGE UP (ref 27–34)
MCHC RBC-ENTMCNC: 31 GM/DL — LOW (ref 32–36)
MCHC RBC-ENTMCNC: 31.7 GM/DL — LOW (ref 32–36)
MCV RBC AUTO: 92.8 FL — SIGNIFICANT CHANGE UP (ref 80–100)
MCV RBC AUTO: 93.2 FL — SIGNIFICANT CHANGE UP (ref 80–100)
NITRITE UR-MCNC: NEGATIVE — SIGNIFICANT CHANGE UP
NRBC # BLD: 0 /100 WBCS — SIGNIFICANT CHANGE UP (ref 0–0)
NRBC # BLD: 0 /100 WBCS — SIGNIFICANT CHANGE UP (ref 0–0)
PH UR: 7.5 — SIGNIFICANT CHANGE UP (ref 5–8)
PLATELET # BLD AUTO: 329 K/UL — SIGNIFICANT CHANGE UP (ref 150–400)
PLATELET # BLD AUTO: 369 K/UL — SIGNIFICANT CHANGE UP (ref 150–400)
POTASSIUM SERPL-MCNC: 4 MMOL/L — SIGNIFICANT CHANGE UP (ref 3.5–5.3)
POTASSIUM SERPL-SCNC: 4 MMOL/L — SIGNIFICANT CHANGE UP (ref 3.5–5.3)
PROT UR-MCNC: 100 MG/DL
RBC # BLD: 2.37 M/UL — LOW (ref 3.8–5.2)
RBC # BLD: 2.5 M/UL — LOW (ref 3.8–5.2)
RBC # FLD: 14.9 % — HIGH (ref 10.3–14.5)
RBC # FLD: 15.6 % — HIGH (ref 10.3–14.5)
SODIUM SERPL-SCNC: 142 MMOL/L — SIGNIFICANT CHANGE UP (ref 135–145)
SP GR SPEC: 1.01 — SIGNIFICANT CHANGE UP (ref 1–1.03)
UROBILINOGEN FLD QL: 2 E.U./DL
VIRUS SPEC CULT: SIGNIFICANT CHANGE UP
WBC # BLD: 14.07 K/UL — HIGH (ref 3.8–10.5)
WBC # BLD: 8.37 K/UL — SIGNIFICANT CHANGE UP (ref 3.8–10.5)
WBC # FLD AUTO: 14.07 K/UL — HIGH (ref 3.8–10.5)
WBC # FLD AUTO: 8.37 K/UL — SIGNIFICANT CHANGE UP (ref 3.8–10.5)

## 2019-05-17 PROCEDURE — 99291 CRITICAL CARE FIRST HOUR: CPT

## 2019-05-17 PROCEDURE — 71045 X-RAY EXAM CHEST 1 VIEW: CPT | Mod: 26

## 2019-05-17 PROCEDURE — 93971 EXTREMITY STUDY: CPT | Mod: 26,LT

## 2019-05-17 PROCEDURE — 99231 SBSQ HOSP IP/OBS SF/LOW 25: CPT

## 2019-05-17 RX ORDER — ROBINUL 0.2 MG/ML
0.4 INJECTION INTRAMUSCULAR; INTRAVENOUS ONCE
Refills: 0 | Status: COMPLETED | OUTPATIENT
Start: 2019-05-17 | End: 2019-05-17

## 2019-05-17 RX ORDER — ALBUMIN HUMAN 25 %
2750 VIAL (ML) INTRAVENOUS ONCE
Refills: 0 | Status: DISCONTINUED | OUTPATIENT
Start: 2019-05-19 | End: 2019-05-20

## 2019-05-17 RX ORDER — INSULIN GLARGINE 100 [IU]/ML
15 INJECTION, SOLUTION SUBCUTANEOUS AT BEDTIME
Refills: 0 | Status: DISCONTINUED | OUTPATIENT
Start: 2019-05-17 | End: 2019-05-21

## 2019-05-17 RX ORDER — ACETAMINOPHEN 500 MG
975 TABLET ORAL ONCE
Refills: 0 | Status: COMPLETED | OUTPATIENT
Start: 2019-05-17 | End: 2019-05-17

## 2019-05-17 RX ORDER — INSULIN GLARGINE 100 [IU]/ML
12 INJECTION, SOLUTION SUBCUTANEOUS ONCE
Refills: 0 | Status: COMPLETED | OUTPATIENT
Start: 2019-05-17 | End: 2019-05-17

## 2019-05-17 RX ORDER — SODIUM CHLORIDE 9 MG/ML
500 INJECTION INTRAMUSCULAR; INTRAVENOUS; SUBCUTANEOUS ONCE
Refills: 0 | Status: COMPLETED | OUTPATIENT
Start: 2019-05-17 | End: 2019-05-17

## 2019-05-17 RX ORDER — CALCIUM GLUCONATE 100 MG/ML
1 VIAL (ML) INTRAVENOUS ONCE
Refills: 0 | Status: COMPLETED | OUTPATIENT
Start: 2019-05-19 | End: 2019-05-19

## 2019-05-17 RX ORDER — MIDODRINE HYDROCHLORIDE 2.5 MG/1
15 TABLET ORAL EVERY 8 HOURS
Refills: 0 | Status: DISCONTINUED | OUTPATIENT
Start: 2019-05-17 | End: 2019-05-26

## 2019-05-17 RX ORDER — ENOXAPARIN SODIUM 100 MG/ML
60 INJECTION SUBCUTANEOUS EVERY 12 HOURS
Refills: 0 | Status: DISCONTINUED | OUTPATIENT
Start: 2019-05-17 | End: 2019-05-20

## 2019-05-17 RX ORDER — ROBINUL 0.2 MG/ML
0.4 INJECTION INTRAMUSCULAR; INTRAVENOUS ONCE
Refills: 0 | Status: DISCONTINUED | OUTPATIENT
Start: 2019-05-17 | End: 2019-05-17

## 2019-05-17 RX ADMIN — NYSTATIN CREAM 1 APPLICATION(S): 100000 CREAM TOPICAL at 13:26

## 2019-05-17 RX ADMIN — PANTOPRAZOLE SODIUM 40 MILLIGRAM(S): 20 TABLET, DELAYED RELEASE ORAL at 13:14

## 2019-05-17 RX ADMIN — INSULIN HUMAN 2: 100 INJECTION, SOLUTION SUBCUTANEOUS at 06:58

## 2019-05-17 RX ADMIN — INSULIN HUMAN 4 UNIT(S): 100 INJECTION, SOLUTION SUBCUTANEOUS at 06:59

## 2019-05-17 RX ADMIN — Medication 975 MILLIGRAM(S): at 22:33

## 2019-05-17 RX ADMIN — MIDODRINE HYDROCHLORIDE 15 MILLIGRAM(S): 2.5 TABLET ORAL at 21:50

## 2019-05-17 RX ADMIN — INSULIN GLARGINE 15 UNIT(S): 100 INJECTION, SOLUTION SUBCUTANEOUS at 21:50

## 2019-05-17 RX ADMIN — INSULIN HUMAN 4 UNIT(S): 100 INJECTION, SOLUTION SUBCUTANEOUS at 13:18

## 2019-05-17 RX ADMIN — MIDODRINE HYDROCHLORIDE 10 MILLIGRAM(S): 2.5 TABLET ORAL at 06:58

## 2019-05-17 RX ADMIN — ROBINUL 0.4 MILLIGRAM(S): 0.2 INJECTION INTRAMUSCULAR; INTRAVENOUS at 08:35

## 2019-05-17 RX ADMIN — MIDODRINE HYDROCHLORIDE 15 MILLIGRAM(S): 2.5 TABLET ORAL at 13:21

## 2019-05-17 RX ADMIN — SODIUM CHLORIDE 2000 MILLILITER(S): 9 INJECTION INTRAMUSCULAR; INTRAVENOUS; SUBCUTANEOUS at 01:55

## 2019-05-17 RX ADMIN — ZINC OXIDE 1 APPLICATION(S): 200 OINTMENT TOPICAL at 13:14

## 2019-05-17 RX ADMIN — CHLORHEXIDINE GLUCONATE 1 APPLICATION(S): 213 SOLUTION TOPICAL at 07:00

## 2019-05-17 RX ADMIN — Medication 975 MILLIGRAM(S): at 23:53

## 2019-05-17 RX ADMIN — Medication 1 APPLICATION(S): at 21:52

## 2019-05-17 RX ADMIN — INSULIN HUMAN 4 UNIT(S): 100 INJECTION, SOLUTION SUBCUTANEOUS at 17:50

## 2019-05-17 RX ADMIN — INSULIN HUMAN 2: 100 INJECTION, SOLUTION SUBCUTANEOUS at 21:53

## 2019-05-17 RX ADMIN — Medication 650 MILLIGRAM(S): at 14:49

## 2019-05-17 RX ADMIN — Medication 100 MILLIGRAM(S): at 13:14

## 2019-05-17 RX ADMIN — NYSTATIN CREAM 1 APPLICATION(S): 100000 CREAM TOPICAL at 07:00

## 2019-05-17 RX ADMIN — Medication 650 MILLIGRAM(S): at 13:43

## 2019-05-17 RX ADMIN — INSULIN HUMAN 2: 100 INJECTION, SOLUTION SUBCUTANEOUS at 17:49

## 2019-05-17 RX ADMIN — TAMOXIFEN CITRATE 20 MILLIGRAM(S): 20 TABLET, FILM COATED ORAL at 09:17

## 2019-05-17 RX ADMIN — Medication 5.47 MICROGRAM(S)/KG/MIN: at 13:41

## 2019-05-17 RX ADMIN — NYSTATIN CREAM 1 APPLICATION(S): 100000 CREAM TOPICAL at 21:52

## 2019-05-17 RX ADMIN — CHLORHEXIDINE GLUCONATE 15 MILLILITER(S): 213 SOLUTION TOPICAL at 17:49

## 2019-05-17 RX ADMIN — ENOXAPARIN SODIUM 60 MILLIGRAM(S): 100 INJECTION SUBCUTANEOUS at 18:20

## 2019-05-17 RX ADMIN — CHLORHEXIDINE GLUCONATE 15 MILLILITER(S): 213 SOLUTION TOPICAL at 07:00

## 2019-05-17 RX ADMIN — INSULIN GLARGINE 12 UNIT(S): 100 INJECTION, SOLUTION SUBCUTANEOUS at 00:59

## 2019-05-17 RX ADMIN — INSULIN HUMAN 4 UNIT(S): 100 INJECTION, SOLUTION SUBCUTANEOUS at 21:53

## 2019-05-17 RX ADMIN — ZINC OXIDE 1 APPLICATION(S): 200 OINTMENT TOPICAL at 21:52

## 2019-05-17 RX ADMIN — Medication 1 APPLICATION(S): at 07:01

## 2019-05-17 RX ADMIN — ZINC OXIDE 1 APPLICATION(S): 200 OINTMENT TOPICAL at 07:01

## 2019-05-17 RX ADMIN — Medication 1 APPLICATION(S): at 13:26

## 2019-05-17 RX ADMIN — SENNA PLUS 2 TABLET(S): 8.6 TABLET ORAL at 21:51

## 2019-05-17 NOTE — PROGRESS NOTE ADULT - SUBJECTIVE AND OBJECTIVE BOX
GYN Progress Note    Patient seen at bedside.  no complaints.  able to be weaned off pressors this AM.  fever 100.6 this am.    Vital Signs Last 24 Hrs  T(C): 38.1 (17 May 2019 06:04), Max: 38.1 (17 May 2019 06:04)  T(F): 100.6 (17 May 2019 06:04), Max: 100.6 (17 May 2019 06:04)  HR: 74 (17 May 2019 11:00) (66 - 96)  BP: 91/51 (17 May 2019 11:00) (65/42 - 133/71)  BP(mean): 67 (17 May 2019 11:00) (47 - 94)  RR: 33 (17 May 2019 11:00) (11 - 37)  SpO2: 100% (17 May 2019 11:00) (92% - 100%)    Physical Exam:  Gen: No Acute Distress; responsive  Pulm: intubated  GI: soft, appropriately nontender, nondistended, stool in ostomy bag  Ext: SCDs in place, wnl, no change in strength    I&O's Summary    16 May 2019 07:01  -  17 May 2019 07:00  --------------------------------------------------------  IN: 2367 mL / OUT: 800 mL / NET: 1567 mL    17 May 2019 07:01  -  17 May 2019 11:24  --------------------------------------------------------  IN: 276 mL / OUT: 185 mL / NET: 91 mL      MEDICATIONS  (STANDING):  albumin human  5% IVPB 2750 milliLiter(s) IV Intermittent once  albumin human  5% IVPB 2750 milliLiter(s) IV Intermittent once  calcium gluconate IVPB 1 Gram(s) IV Intermittent once  chlorhexidine 0.12% Liquid 15 milliLiter(s) Oral Mucosa every 12 hours  chlorhexidine 2% Cloths 1 Application(s) Topical <User Schedule>  dextrose 5%. 1000 milliLiter(s) (50 mL/Hr) IV Continuous <Continuous>  dextrose 50% Injectable 25 Gram(s) IV Push once  docusate sodium Liquid 100 milliGRAM(s) Oral daily  enoxaparin Injectable 40 milliGRAM(s) SubCutaneous every 24 hours  heparin  flush 100 Units/mL Injectable 2000 Unit(s) IV Push once  heparin 1000 Unit(s) 1000 Unit(s) IV Push once  Heparin injectable 1000 units/mL 2000 Unit(s) 2000 Unit(s) IV Push once  Heparin injectable 1000 units/mL 2000 Unit(s) 2000 Unit(s) IV Push once  insulin glargine Injectable (LANTUS) 15 Unit(s) SubCutaneous at bedtime  insulin regular  human corrective regimen sliding scale   SubCutaneous every 6 hours  insulin regular  human recombinant 4 Unit(s) SubCutaneous every 6 hours  midodrine 10 milliGRAM(s) Oral every 8 hours  norepinephrine Infusion 0.05 MICROgram(s)/kG/Min (5.466 mL/Hr) IV Continuous <Continuous>  nystatin Powder 1 Application(s) Topical three times a day  pantoprazole   Suspension 40 milliGRAM(s) Oral daily  petrolatum Ophthalmic Ointment 1 Application(s) Both EYES three times a day  propofol Infusion 5 MICROgram(s)/kG/Min (1.749 mL/Hr) IV Continuous <Continuous>  senna 2 Tablet(s) Oral at bedtime  sodium chloride 0.9% Bolus 500 milliLiter(s) IV Bolus once  tamoxifen 20 milliGRAM(s) Oral <User Schedule>  zinc oxide 20% Ointment 1 Application(s) Topical three times a day    MEDICATIONS  (PRN):  acetaminophen    Suspension .. 650 milliGRAM(s) Oral once PRN Moderate Pain (4 - 6)  dextrose 40% Gel 15 Gram(s) Oral once PRN Blood Glucose LESS THAN 70 milliGRAM(s)/deciliter  glucagon  Injectable 1 milliGRAM(s) IntraMuscular once PRN Glucose LESS THAN 70 milligrams/deciliter      LABS:                        7.2    8.37  )-----------( 329      ( 17 May 2019 03:54 )             23.2     -    142  |  102  |  20  ----------------------------<  202<H>  4.0   |  30  |  0.45<L>    Ca    9.5      17 May 2019 03:54  Phos  3.2     05-16  Mg     2.1     -17    TPro  6.0  /  Alb  4.5  /  TBili  0.6  /  DBili  x   /  AST  See Note  /  ALT  <5<L>  /  AlkPhos  23<L>  05-15    PT/INR - ( 16 May 2019 05:25 )   PT: 12.8 sec;   INR: 1.13          PTT - ( 16 May 2019 05:25 )  PTT:33.6 sec  Urinalysis Basic - ( 17 May 2019 10:48 )    Color: Yellow / Appearance: Turbid / S.015 / pH: x  Gluc: x / Ketone: NEGATIVE  / Bili: Negative / Urobili: 2.0 E.U./dL   Blood: x / Protein: 100 mg/dL / Nitrite: NEGATIVE   Leuk Esterase: Large / RBC: 5-10 /HPF / WBC loaded /HPF   Sq Epi: x / Non Sq Epi: 0-5 /HPF / Bacteria: Present /HPF

## 2019-05-17 NOTE — PROGRESS NOTE ADULT - ATTENDING COMMENTS
Given concern for upper extremity DVT, recommend discontinuation of tamoxifen. If patient recovers from acute neurologic issue then we can reassess and discuss cancer directed treatment options.

## 2019-05-17 NOTE — PROGRESS NOTE ADULT - ASSESSMENT
60yo F PMHx polio, breast cancer, DM, Endometrial Cancer s/p exploratory laparotomy, enterolysis, SHAMIR, BSO, pelvic lymphadenectomy, low anterior resection mobilization of splenic flexure, end colostomy on 7/30/18, rectovaginal fistula, numerous admissions for urinary tract infection presented to [facility] in the setting of urosepsis. Hospital course complicated by blood stream infection (currently on ertapenem) and respiratory failure requiring intubation s/p extubation on 5/2. Neurology consulted for worsening lower extremity weakness. On prior admission EMG raises suspicion for CIDP-spectrum disorder. Per collateral information patient became noticeably weak mala in her hands for approx 1 week prior to being diagnosed with IDP in April, suggesting a more acute process. 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 s/p four days of IVIG 500mg/kg on 5/7 - 5/10. Now receiving plasma exchange therapy.  Recommend  - plasma exhange yesterday, will undergo 3rd of 5 rounds today, please continue Q48h for 5x total (10days)  -fibrinogen WNL  - Pending MR brain w/ and w/o con & MR cervical spine w/ and w/o con once stable   Neurology will follow

## 2019-05-17 NOTE — PROGRESS NOTE ADULT - SUBJECTIVE AND OBJECTIVE BOX
Patient seen during plasma exchange. She was sleeping, but easily aroused. She remains intubated, but she reports feeling stronger today.  PE: Low grade fever noted - currently VSS   Catheter site is clean and dry    Chest: clear to anterior auscultation     Cor: Reg S1S2    Ext: SCD in place  Labs reviewed. Patient cleared for TPE today (#3/5)    Of note: Ultrasound shows a superficial cephalic vein thrombosis with IV partially embedded.     Case discussed with [facility] staff. The plan is to remove the iv from her left arm and procedure with TPE. Fever work- up in progress.

## 2019-05-17 NOTE — PROGRESS NOTE ADULT - SUBJECTIVE AND OBJECTIVE BOX
INTERVAL HPI/OVERNIGHT EVENTS: 11pm fsg 84, lantus reduced to 12 units for tonight since feeds were held several hours, restarted feeds, hold the nutritrional of 4 units at midnight. Given 500cc NS. Had PVCs - check BMP/Mg early - no electrolyte abnormality to correct.    SUBJECTIVE: Patient seen and examined at bedside.     CONSTITUTIONAL: No weakness, fevers or chills  EYES/ENT: No visual changes;  No vertigo or throat pain   NECK: No pain or stiffness  RESPIRATORY: No cough, wheezing, hemoptysis; No shortness of breath  CARDIOVASCULAR: No chest pain or palpitations  GASTROINTESTINAL: No abdominal or epigastric pain. No nausea, vomiting, or hematemesis; No diarrhea or constipation. No melena or hematochezia.  GENITOURINARY: No dysuria, frequency or hematuria  NEUROLOGICAL: No numbness or weakness  SKIN: No itching, rashes    OBJECTIVE:    VITAL SIGNS:  ICU Vital Signs Last 24 Hrs  T(C): 36.7 (17 May 2019 01:03), Max: 37.6 (16 May 2019 07:00)  T(F): 98 (17 May 2019 01:03), Max: 99.6 (16 May 2019 07:00)  HR: 76 (17 May 2019 06:03) (64 - 96)  BP: 121/59 (17 May 2019 05:00) (65/42 - 133/71)  BP(mean): 78 (17 May 2019 05:00) (47 - 94)  ABP: --  ABP(mean): --  RR: 12 (17 May 2019 05:00) (11 - 32)  SpO2: 99% (17 May 2019 06:03) (92% - 100%)    Mode: CPAP with PS, FiO2: 40, PEEP: 5, PS: 12, MAP: 8, PIP: 18    05-15 @ 07:01  -  05-16 @ 07:00  --------------------------------------------------------  IN: 946.6 mL / OUT: 1560 mL / NET: -613.4 mL    05-16 @ 07:01  -  05-17 @ 06:27  --------------------------------------------------------  IN: 2269 mL / OUT: 725 mL / NET: 1544 mL      CAPILLARY BLOOD GLUCOSE      POCT Blood Glucose.: 193 mg/dL (17 May 2019 06:22)      PHYSICAL EXAM:    General: NAD  HEENT: NC/AT; PERRL, clear conjunctiva  Neck: supple  Respiratory: CTA b/l  Cardiovascular: +S1/S2; RRR  Abdomen: soft, NT/ND; +BS x4  Extremities: WWP, 2+ peripheral pulses b/l; no LE edema  Skin: normal color and turgor; no rash  Neurological:    MEDICATIONS:  MEDICATIONS  (STANDING):  albumin human  5% IVPB 2750 milliLiter(s) IV Intermittent once  albumin human  5% IVPB 2750 milliLiter(s) IV Intermittent once  calcium gluconate IVPB 1 Gram(s) IV Intermittent once  chlorhexidine 0.12% Liquid 15 milliLiter(s) Oral Mucosa every 12 hours  chlorhexidine 2% Cloths 1 Application(s) Topical <User Schedule>  dextrose 5%. 1000 milliLiter(s) (50 mL/Hr) IV Continuous <Continuous>  dextrose 50% Injectable 25 Gram(s) IV Push once  docusate sodium Liquid 100 milliGRAM(s) Oral daily  enoxaparin Injectable 40 milliGRAM(s) SubCutaneous every 24 hours  heparin  flush 100 Units/mL Injectable 2000 Unit(s) IV Push once  heparin 1000 Unit(s) 1000 Unit(s) IV Push once  Heparin injectable 1000 units/mL 2000 Unit(s) 2000 Unit(s) IV Push once  Heparin injectable 1000 units/mL 2000 Unit(s) 2000 Unit(s) IV Push once  insulin glargine Injectable (LANTUS) 15 Unit(s) SubCutaneous at bedtime  insulin regular  human corrective regimen sliding scale   SubCutaneous every 6 hours  insulin regular  human recombinant 4 Unit(s) SubCutaneous every 6 hours  midodrine 10 milliGRAM(s) Oral every 8 hours  norepinephrine Infusion 0.05 MICROgram(s)/kG/Min (5.466 mL/Hr) IV Continuous <Continuous>  nystatin Powder 1 Application(s) Topical three times a day  pantoprazole   Suspension 40 milliGRAM(s) Oral daily  petrolatum Ophthalmic Ointment 1 Application(s) Both EYES three times a day  propofol Infusion 5 MICROgram(s)/kG/Min (1.749 mL/Hr) IV Continuous <Continuous>  senna 2 Tablet(s) Oral at bedtime  sodium chloride 0.9% Bolus 500 milliLiter(s) IV Bolus once  tamoxifen 20 milliGRAM(s) Oral <User Schedule>  zinc oxide 20% Ointment 1 Application(s) Topical three times a day    MEDICATIONS  (PRN):  acetaminophen    Suspension .. 650 milliGRAM(s) Oral once PRN Moderate Pain (4 - 6)  dextrose 40% Gel 15 Gram(s) Oral once PRN Blood Glucose LESS THAN 70 milliGRAM(s)/deciliter  glucagon  Injectable 1 milliGRAM(s) IntraMuscular once PRN Glucose LESS THAN 70 milligrams/deciliter      ALLERGIES:  Allergies    No Known Allergies    Intolerances    IVIG PRODUCT IS PRIGIVEN OR GAMMUNEX (Unknown)      LABS:                        7.2    8.37  )-----------( 329      ( 17 May 2019 03:54 )             23.2     05-17    142  |  102  |  20  ----------------------------<  202<H>  4.0   |  30  |  0.45<L>    Ca    9.5      17 May 2019 03:54  Phos  3.2     05-16  Mg     2.1     05-17    TPro  6.0  /  Alb  4.5  /  TBili  0.6  /  DBili  x   /  AST  See Note  /  ALT  <5<L>  /  AlkPhos  23<L>  05-15    PT/INR - ( 16 May 2019 05:25 )   PT: 12.8 sec;   INR: 1.13          PTT - ( 16 May 2019 05:25 )  PTT:33.6 sec      RADIOLOGY & ADDITIONAL TESTS: Reviewed.    ASSESSMENT:    PLAN: INTERVAL HPI/OVERNIGHT EVENTS: 11pm fsg 84, lantus reduced to 12 units for tonight since feeds were held several hours, restarted feeds, hold the nutritrional of 4 units at midnight. Given 500cc NS.    SUBJECTIVE: Patient seen and examined at bedside. Pt more alert this AM. Still complaini    OBJECTIVE:    VITAL SIGNS:  ICU Vital Signs Last 24 Hrs  T(C): 36.7 (17 May 2019 01:03), Max: 37.6 (16 May 2019 07:00)  T(F): 98 (17 May 2019 01:03), Max: 99.6 (16 May 2019 07:00)  HR: 76 (17 May 2019 06:03) (64 - 96)  BP: 121/59 (17 May 2019 05:00) (65/42 - 133/71)  BP(mean): 78 (17 May 2019 05:00) (47 - 94)  ABP: --  ABP(mean): --  RR: 12 (17 May 2019 05:00) (11 - 32)  SpO2: 99% (17 May 2019 06:03) (92% - 100%)    Mode: CPAP with PS, FiO2: 40, PEEP: 5, PS: 12, MAP: 8, PIP: 18    05-15 @ 07:01  -  05-16 @ 07:00  --------------------------------------------------------  IN: 946.6 mL / OUT: 1560 mL / NET: -613.4 mL    05-16 @ 07:01  -  05-17 @ 06:27  --------------------------------------------------------  IN: 2269 mL / OUT: 725 mL / NET: 1544 mL      CAPILLARY BLOOD GLUCOSE      POCT Blood Glucose.: 193 mg/dL (17 May 2019 06:22)      PHYSICAL EXAM:    General: NAD  HEENT: NC/AT; PERRL, clear conjunctiva  Neck: supple  Respiratory: CTA b/l  Cardiovascular: +S1/S2; RRR  Abdomen: soft, NT/ND; +BS x4  Extremities: WWP, 2+ peripheral pulses b/l; no LE edema  Skin: normal color and turgor; no rash  Neurological:    MEDICATIONS:  MEDICATIONS  (STANDING):  albumin human  5% IVPB 2750 milliLiter(s) IV Intermittent once  albumin human  5% IVPB 2750 milliLiter(s) IV Intermittent once  calcium gluconate IVPB 1 Gram(s) IV Intermittent once  chlorhexidine 0.12% Liquid 15 milliLiter(s) Oral Mucosa every 12 hours  chlorhexidine 2% Cloths 1 Application(s) Topical <User Schedule>  dextrose 5%. 1000 milliLiter(s) (50 mL/Hr) IV Continuous <Continuous>  dextrose 50% Injectable 25 Gram(s) IV Push once  docusate sodium Liquid 100 milliGRAM(s) Oral daily  enoxaparin Injectable 40 milliGRAM(s) SubCutaneous every 24 hours  heparin  flush 100 Units/mL Injectable 2000 Unit(s) IV Push once  heparin 1000 Unit(s) 1000 Unit(s) IV Push once  Heparin injectable 1000 units/mL 2000 Unit(s) 2000 Unit(s) IV Push once  Heparin injectable 1000 units/mL 2000 Unit(s) 2000 Unit(s) IV Push once  insulin glargine Injectable (LANTUS) 15 Unit(s) SubCutaneous at bedtime  insulin regular  human corrective regimen sliding scale   SubCutaneous every 6 hours  insulin regular  human recombinant 4 Unit(s) SubCutaneous every 6 hours  midodrine 10 milliGRAM(s) Oral every 8 hours  norepinephrine Infusion 0.05 MICROgram(s)/kG/Min (5.466 mL/Hr) IV Continuous <Continuous>  nystatin Powder 1 Application(s) Topical three times a day  pantoprazole   Suspension 40 milliGRAM(s) Oral daily  petrolatum Ophthalmic Ointment 1 Application(s) Both EYES three times a day  propofol Infusion 5 MICROgram(s)/kG/Min (1.749 mL/Hr) IV Continuous <Continuous>  senna 2 Tablet(s) Oral at bedtime  sodium chloride 0.9% Bolus 500 milliLiter(s) IV Bolus once  tamoxifen 20 milliGRAM(s) Oral <User Schedule>  zinc oxide 20% Ointment 1 Application(s) Topical three times a day    MEDICATIONS  (PRN):  acetaminophen    Suspension .. 650 milliGRAM(s) Oral once PRN Moderate Pain (4 - 6)  dextrose 40% Gel 15 Gram(s) Oral once PRN Blood Glucose LESS THAN 70 milliGRAM(s)/deciliter  glucagon  Injectable 1 milliGRAM(s) IntraMuscular once PRN Glucose LESS THAN 70 milligrams/deciliter      ALLERGIES:  Allergies    No Known Allergies    Intolerances    IVIG PRODUCT IS PRIGIVEN OR GAMMUNEX (Unknown)      LABS:                        7.2    8.37  )-----------( 329      ( 17 May 2019 03:54 )             23.2     05-17    142  |  102  |  20  ----------------------------<  202<H>  4.0   |  30  |  0.45<L>    Ca    9.5      17 May 2019 03:54  Phos  3.2     05-16  Mg     2.1     05-17    TPro  6.0  /  Alb  4.5  /  TBili  0.6  /  DBili  x   /  AST  See Note  /  ALT  <5<L>  /  AlkPhos  23<L>  05-15    PT/INR - ( 16 May 2019 05:25 )   PT: 12.8 sec;   INR: 1.13          PTT - ( 16 May 2019 05:25 )  PTT:33.6 sec      RADIOLOGY & ADDITIONAL TESTS: Reviewed.    ASSESSMENT:    PLAN: INTERVAL HPI/OVERNIGHT EVENTS: 11pm fsg 84, lantus reduced to 12 units for tonight since feeds were held several hours, restarted feeds, hold the nutritrional of 4 units at midnight. Given 500cc NS.    SUBJECTIVE: Patient seen and examined at bedside. Pt more alert this AM. Agrees that she feels that she is getting stronger. No new complaints.     OBJECTIVE:    VITAL SIGNS:  ICU Vital Signs Last 24 Hrs  T(C): 36.7 (17 May 2019 01:03), Max: 37.6 (16 May 2019 07:00)  T(F): 98 (17 May 2019 01:03), Max: 99.6 (16 May 2019 07:00)  HR: 76 (17 May 2019 06:03) (64 - 96)  BP: 121/59 (17 May 2019 05:00) (65/42 - 133/71)  BP(mean): 78 (17 May 2019 05:00) (47 - 94)  ABP: --  ABP(mean): --  RR: 12 (17 May 2019 05:00) (11 - 32)  SpO2: 99% (17 May 2019 06:03) (92% - 100%)    Mode: CPAP with PS, FiO2: 40, PEEP: 5, PS: 12, MAP: 8, PIP: 18    05-15 @ 07:01  -  05-16 @ 07:00  --------------------------------------------------------  IN: 946.6 mL / OUT: 1560 mL / NET: -613.4 mL    05-16 @ 07:01  -  05-17 @ 06:27  --------------------------------------------------------  IN: 2269 mL / OUT: 725 mL / NET: 1544 mL      CAPILLARY BLOOD GLUCOSE      POCT Blood Glucose.: 193 mg/dL (17 May 2019 06:22)      PHYSICAL EXAM:    General: NAD, comfortable on CPAP trial   HEENT: NCAT, PERRL, clear conjunctiva, no scleral icterus  Neck: supple, no JVD, R HD cath in place  Respiratory: good air entry bilaterally, Mild rhonchi bilaterally.  Cardiovascular: RRR, normal S1S2, no M/R/G  Vascular: 1+ radial and DP pulses  Abdomen: soft, NT/ND, bowel sounds present, L colostomy w pink stoma, no palpable masses  Extremities: cool to touch, no clubbing, cyanosis, or edema  Skin: No rashes present  Neuro: 1/5 strength in feet, 2/5 in legs b/l, 4/5 strength in upper extremities. No focal deficits     MEDICATIONS:  MEDICATIONS  (STANDING):  albumin human  5% IVPB 2750 milliLiter(s) IV Intermittent once  albumin human  5% IVPB 2750 milliLiter(s) IV Intermittent once  calcium gluconate IVPB 1 Gram(s) IV Intermittent once  chlorhexidine 0.12% Liquid 15 milliLiter(s) Oral Mucosa every 12 hours  chlorhexidine 2% Cloths 1 Application(s) Topical <User Schedule>  dextrose 5%. 1000 milliLiter(s) (50 mL/Hr) IV Continuous <Continuous>  dextrose 50% Injectable 25 Gram(s) IV Push once  docusate sodium Liquid 100 milliGRAM(s) Oral daily  enoxaparin Injectable 40 milliGRAM(s) SubCutaneous every 24 hours  heparin  flush 100 Units/mL Injectable 2000 Unit(s) IV Push once  heparin 1000 Unit(s) 1000 Unit(s) IV Push once  Heparin injectable 1000 units/mL 2000 Unit(s) 2000 Unit(s) IV Push once  Heparin injectable 1000 units/mL 2000 Unit(s) 2000 Unit(s) IV Push once  insulin glargine Injectable (LANTUS) 15 Unit(s) SubCutaneous at bedtime  insulin regular  human corrective regimen sliding scale   SubCutaneous every 6 hours  insulin regular  human recombinant 4 Unit(s) SubCutaneous every 6 hours  midodrine 10 milliGRAM(s) Oral every 8 hours  norepinephrine Infusion 0.05 MICROgram(s)/kG/Min (5.466 mL/Hr) IV Continuous <Continuous>  nystatin Powder 1 Application(s) Topical three times a day  pantoprazole   Suspension 40 milliGRAM(s) Oral daily  petrolatum Ophthalmic Ointment 1 Application(s) Both EYES three times a day  propofol Infusion 5 MICROgram(s)/kG/Min (1.749 mL/Hr) IV Continuous <Continuous>  senna 2 Tablet(s) Oral at bedtime  sodium chloride 0.9% Bolus 500 milliLiter(s) IV Bolus once  tamoxifen 20 milliGRAM(s) Oral <User Schedule>  zinc oxide 20% Ointment 1 Application(s) Topical three times a day    MEDICATIONS  (PRN):  acetaminophen    Suspension .. 650 milliGRAM(s) Oral once PRN Moderate Pain (4 - 6)  dextrose 40% Gel 15 Gram(s) Oral once PRN Blood Glucose LESS THAN 70 milliGRAM(s)/deciliter  glucagon  Injectable 1 milliGRAM(s) IntraMuscular once PRN Glucose LESS THAN 70 milligrams/deciliter      ALLERGIES:  Allergies    No Known Allergies    Intolerances    IVIG PRODUCT IS PRIGIVEN OR GAMMUNEX (Unknown)      LABS:                        7.2    8.37  )-----------( 329      ( 17 May 2019 03:54 )             23.2     05-17    142  |  102  |  20  ----------------------------<  202<H>  4.0   |  30  |  0.45<L>    Ca    9.5      17 May 2019 03:54  Phos  3.2     05-16  Mg     2.1     05-17    TPro  6.0  /  Alb  4.5  /  TBili  0.6  /  DBili  x   /  AST  See Note  /  ALT  <5<L>  /  AlkPhos  23<L>  05-15    PT/INR - ( 16 May 2019 05:25 )   PT: 12.8 sec;   INR: 1.13          PTT - ( 16 May 2019 05:25 )  PTT:33.6 sec      RADIOLOGY & ADDITIONAL TESTS: Reviewed.

## 2019-05-17 NOTE — PROGRESS NOTE ADULT - SUBJECTIVE AND OBJECTIVE BOX
seen at bedside - getting plasmapheresis.  pt is back on pressors at this time.   no complaints.  was febrile/low grade temp 100.2  upper and lower extremity dopplers done - upper extremity doppler shows DVT (per verbal read) on the distal left side. also reading there's a PICC in place but patient does not have a PICC.   per team, pt will get therapeutic lovenox.  recommend stopping tamoxifen and also not restarting megace.

## 2019-05-17 NOTE — PROGRESS NOTE ADULT - ATTENDING COMMENTS
I was physically present for the key portions of the evaluation and managemnent (E/M) service provided.  I agree with the above history, physical, and plan which I have reviewed and edited where appropriate, with the exceptions as per my note.    Pt seen and examined.    Seems to have had some mild improvement in neuro exam. Still 4/5 in BL UEs and 2/5 in BL hip flexors but with some partial antigravity hip flexion that was not noted before.    AP: cont PLEX.

## 2019-05-17 NOTE — PROGRESS NOTE ADULT - SUBJECTIVE AND OBJECTIVE BOX
NEUROLOGY CONSULT PROGRESS NOTE  Patient for 3rd round of Plex today. Mildly improved strength in upper and lower extremities b/l.     INTERVAL HISTORY:      REVIEW OF SYSTEMS:  As per HPI, otherwise negative for Constitutional, Eyes, Ears/Nose/Mouth/Throat, Neck, Cardiovascular, Respiratory, Gastrointestinal, Genitourinary, Skin, Endocrine, Musculoskeletal, Psychiatric, and Hematologic/Lymphatic.    MEDICATIONS:  albumin human  5% IVPB 2750 milliLiter(s) IV Intermittent once  calcium gluconate IVPB 1 Gram(s) IV Intermittent once  chlorhexidine 0.12% Liquid 15 milliLiter(s) Oral Mucosa every 12 hours  chlorhexidine 2% Cloths 1 Application(s) Topical <User Schedule>  dextrose 40% Gel 15 Gram(s) Oral once PRN  dextrose 5%. 1000 milliLiter(s) IV Continuous <Continuous>  dextrose 50% Injectable 25 Gram(s) IV Push once  docusate sodium Liquid 100 milliGRAM(s) Oral daily  enoxaparin Injectable 40 milliGRAM(s) SubCutaneous every 24 hours  glucagon  Injectable 1 milliGRAM(s) IntraMuscular once PRN  Heparin injectable 1000 units/mL 2000 Unit(s),Heparin injectable 1000 units/mL 2000 Unit(s) 2000 Unit(s) IV Push once  Heparin injectable 1000 units/mL 2000 Unit(s),Heparin injectable 1000 units/mL 2000 Unit(s) 2000 Unit(s) IV Push once  insulin glargine Injectable (LANTUS) 15 Unit(s) SubCutaneous at bedtime  insulin regular  human corrective regimen sliding scale   SubCutaneous every 6 hours  insulin regular  human recombinant 4 Unit(s) SubCutaneous every 6 hours  midodrine 15 milliGRAM(s) Oral every 8 hours  norepinephrine Infusion 0.05 MICROgram(s)/kG/Min IV Continuous <Continuous>  nystatin Powder 1 Application(s) Topical three times a day  pantoprazole   Suspension 40 milliGRAM(s) Oral daily  petrolatum Ophthalmic Ointment 1 Application(s) Both EYES three times a day  propofol Infusion 5 MICROgram(s)/kG/Min IV Continuous <Continuous>  senna 2 Tablet(s) Oral at bedtime  sodium chloride 0.9% Bolus 500 milliLiter(s) IV Bolus once  zinc oxide 20% Ointment 1 Application(s) Topical three times a day    VITAL SIGNS:  Vital Signs Last 24 Hrs  T(C): 37.9 (17 May 2019 14:43), Max: 38.5 (17 May 2019 11:28)  T(F): 100.2 (17 May 2019 14:43), Max: 101.3 (17 May 2019 11:28)  HR: 72 (17 May 2019 15:00) (68 - 96)  BP: 103/43 (17 May 2019 15:00) (65/42 - 130/72)  BP(mean): 64 (17 May 2019 15:00) (47 - 94)  RR: 15 (17 May 2019 15:00) (12 - 37)  SpO2: 100% (17 May 2019 15:00) (92% - 100%)    PHYSICAL EXAMINATION:  Constitutional: Frail middle age female, intubated, NG tube in place  Eyes: Conjunctiva and sclera clear  Neurologic:  - Mental Status:  Awake and alert, able to follow commands as directed  - Cranial Nerves II-XII:    II: Pupils are equal, round, and reactive to light.  III, IV, VI:  EOMI  V:  Facial sensation is intact  VII:  Strong eye closure   VIII:  Hearing is intact to finger rub.  XI:  Weak shoulder shrug, able to move head  - Motor: Hypotrophic muscles. Upper extremities 4+/5 Lower extremities Leg 2+/5 bilaterally, Ankle 0/5  - Reflexes:  0/5 reflexes at biceps, triceps, brachioradialis, knees, and ankles. No babinski response.   - Sensory:  Intact to light touch  - Gait: Deferred    LABS:                          7.2    8.37  )-----------( 329      ( 17 May 2019 03:54 )             23.2     05-17    142  |  102  |  20  ----------------------------<  202<H>  4.0   |  30  |  0.45<L>    Ca    9.5      17 May 2019 03:54  Phos  3.2     05-16  Mg     2.1     05-17    TPro  6.0  /  Alb  4.5  /  TBili  0.6  /  DBili  x   /  AST  See Note  /  ALT  <5<L>  /  AlkPhos  23<L>  05-15    PT/INR - ( 16 May 2019 05:25 )   PT: 12.8 sec;   INR: 1.13          PTT - ( 16 May 2019 05:25 )  PTT:33.6 sec  Urinalysis Basic - ( 17 May 2019 10:48 )    Color: Yellow / Appearance: Turbid / S.015 / pH: x  Gluc: x / Ketone: NEGATIVE  / Bili: Negative / Urobili: 2.0 E.U./dL   Blood: x / Protein: 100 mg/dL / Nitrite: NEGATIVE   Leuk Esterase: Large / RBC: 5-10 /HPF / WBC loaded /HPF   Sq Epi: x / Non Sq Epi: 0-5 /HPF / Bacteria: Present /HPF        RADIOLOGY & ADDITIONAL STUDIES:      IMPRESSION & RECOMMENDATIONS:

## 2019-05-17 NOTE — PROGRESS NOTE ADULT - PROBLEM/PLAN-2
DISPLAY PLAN FREE TEXT
PSYCHOMOTOR AGITATION

## 2019-05-17 NOTE — PROGRESS NOTE ADULT - ASSESSMENT
60 yo F PMHx polio, breast cancer, DM, Endometrial Cancer s/p exploratory laparotomy, enterolysis, SHAMIR, BSO, pelvic lymphadenectomy, low anterior resection mobilization of splenic flexure, end colostomy on 7/30/18, rectovaginal fistula, numerous admissions for urinary tract infection presented to [facility] in the setting of urosepsis. Hospital course complicated by blood stream infection (currently on ertapenem) and respiratory failure requiring intubation s/p extubation on 5/2. Intubated urgently on 7 lachman for CO2 narcosis in setting of AIDP and stepped up to MICU, initially improving s/p IVIG and extubated to bipap on 5/10, reintubated for CO2 narcosis on 5/14 and started on plasmapheresis     CARDIOVASCULAR  #Hemodynamics  Stable  - making appropriate amounts of urine, mentating well  - Still on levophed.  Uptitrated midodrine to 15mg TID. Will try to down titrate levophed    PULMONARY  #Acute hypercapnic respiratory failure   2/2 to demyelinating polyneuropathy (AIDP), patient extubated to bipap on 5/10, reintubated on 5/14 for CO2 narcosis  - s/p 4 doses of IVIG finished 5/11 and started on plasmapharesis on 5/13. 3rd round today. Will receive every other day for a total of 5 doses   - appreciate neurology recs  - appreciate transfusion medicine recs  - scopolamine patch placed on 5/13      #Hx of urinary retention  - retaining on 5/8, placed joseph  - might need SP catheter  - appreciate urology recs    Neurology  #AIDP  Patient w AIDP leading to respiratory compromise, previous EMG w demyelinating polyneuropathy  - recs per neurology  - s/p 4 rounds of IVIG, with dramatic improvement in strength and respiratory status, however decompensated on 5/13 and reintubated  - f/u MRI brain w/wo contrast and MR cervical spine w/wo contrast when patient stable to go for imaging  - plasmapheresis as above under pulmonary    INFECTIOUS DISEASE  #hx of recurrent ESBL ecoli UTI and bacteremia  - previously on admission patient admitted to MICU in septic shock 2/2 to ESBL ecoli bacteremia and UTI  - Completed tx w/ ertapenem on 5/14     #Fever  - Pt spiked to 100.6 this AM. UA positive but pt recently tx for ESBL Ecoli UTI   - CXR negative. No new infiltrates  - Fever most likely from left cephalic v. thrombus   - f/u BCx 5/17    HEME/ONC  #Stage IV endometrial adenocarcinoma  Per GYN-ONC w/ interval increase in tumor burden. Pt was treated with Anastrazole and initiated 3 weeks of megace 80mg BID followed by 3 weeks of tamoxifen.   - Pt was on tamoxifen and developed a left cephalic v. thrombus. Tamoxifen was dc'd on 5/17   - GYN-ONC following, appreciate further recs     #Cephalic vein thrombosis  - As above  - Continue w/ Lovenox 60mg BID (5/27-)    FLUIDS/ELECTROLYTES/NUTRITION  -IVF: none  -Monitor, Replete to K>4 and Mg>2  -Diet: continue NGT feeds for now    PROPHYLAXIS  -DVT: Lovenox and SCDs  -GI: none as on feeds    DISPO: MICU    CODE STATUS: DNR/DNI, palliative following

## 2019-05-17 NOTE — PROGRESS NOTE ADULT - ASSESSMENT
58yo F HD18 with stage IV endometrial adenocarcinoma s/p staging surgery '18 and 1 round of chemotherapy 2/19 readmitted from [facility] with fever, previously admitted for management of symptomatic rectovaginal and enterovaginal fistulas. Presents on referral from [facility] with urosepsis. Rapid response called due to worsening respiratory status s/p intubation and MICU admission. She then was on regional GYN service however had respiratory weakness and was transferred to Medicine/tele HD9 until requiring intubation again HD10 and is now in MICU. 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 She has been on plasmapheresis. She as re-intubated on 5/13 due to respiratory distress. She was made DNR/DNI 5/14.   Management per MICU.    1. Neuro: weakness secondary Guillain [place] versus exacerbation of AIDP- now status post IVIG x4 day course. Neurology following-appreciate recs. Patient has likely failed IVIG treatment given current status. Now undergoing plasmapheresis.  2. Pulm: Re-intubated 5/13. Extubated 5/10-transitioned to BiPAP initially, then on nasal canula 4L. re-intubated due to respiratory distress. now DNR/DNI. tolerating CPAP daily.  3. Cardio: again on pressor support. Appropriate urine output   4. FEN/GI: Dobhoff in situ, on tube feeds. repleting electrotyes prn  5. : Adequate urine output, joseph in place.  - Joseph replaced 5/8 after finding of overflow incontinence, f/u urology recs regarding suprapubic catheter   6. ID: s/p ertapenem ended 5/14, s/p meropenem, s/p vanc, appreciate ID recs. febrile 5/13. has low grade fevers 100.  7. Endocrine: FS, ISS, Lantus 8u qhs started 5/14, Metformin 1000mg BID held at this time   8. VTE prophylaxis - SCDs, Lovenox 40mg daily   9. GYN malignancy  -Stopped anastrazole and initiated 3 weeks of megace 80mg BID followed by 3 weeks of tamoxifen.   - Started megace and metformin 4/10, now on Tamoxifen for 21 days (5/1/19-  Compound version given. Currently being given.  -  Avoid immunotherapy at this time given associated risk of autoimmune disease   10. Derm: monitor sacrum for s/s of pressure ulcer, now stage 2  11. PT/OT needs evaluation, OOB with nursing staff  12. Social work/palliative: DNR/DNI

## 2019-05-18 LAB
ANION GAP SERPL CALC-SCNC: 11 MMOL/L — SIGNIFICANT CHANGE UP (ref 5–17)
APPEARANCE UR: ABNORMAL
BILIRUB UR-MCNC: NEGATIVE — SIGNIFICANT CHANGE UP
BLD GP AB SCN SERPL QL: NEGATIVE — SIGNIFICANT CHANGE UP
BUN SERPL-MCNC: 19 MG/DL — SIGNIFICANT CHANGE UP (ref 7–23)
CALCIUM SERPL-MCNC: 9 MG/DL — SIGNIFICANT CHANGE UP (ref 8.4–10.5)
CHLORIDE SERPL-SCNC: 103 MMOL/L — SIGNIFICANT CHANGE UP (ref 96–108)
CO2 SERPL-SCNC: 30 MMOL/L — SIGNIFICANT CHANGE UP (ref 22–31)
COLOR SPEC: YELLOW — SIGNIFICANT CHANGE UP
CREAT SERPL-MCNC: 0.43 MG/DL — LOW (ref 0.5–1.3)
CULTURE RESULTS: SIGNIFICANT CHANGE UP
CULTURE RESULTS: SIGNIFICANT CHANGE UP
DIFF PNL FLD: ABNORMAL
GLUCOSE BLDC GLUCOMTR-MCNC: 138 MG/DL — HIGH (ref 70–99)
GLUCOSE BLDC GLUCOMTR-MCNC: 143 MG/DL — HIGH (ref 70–99)
GLUCOSE BLDC GLUCOMTR-MCNC: 165 MG/DL — HIGH (ref 70–99)
GLUCOSE BLDC GLUCOMTR-MCNC: 200 MG/DL — HIGH (ref 70–99)
GLUCOSE SERPL-MCNC: 200 MG/DL — HIGH (ref 70–99)
GLUCOSE UR QL: NEGATIVE — SIGNIFICANT CHANGE UP
HCT VFR BLD CALC: 19.5 % — CRITICAL LOW (ref 34.5–45)
HCT VFR BLD CALC: 25.8 % — LOW (ref 34.5–45)
HGB BLD-MCNC: 6.2 G/DL — CRITICAL LOW (ref 11.5–15.5)
HGB BLD-MCNC: 8.1 G/DL — LOW (ref 11.5–15.5)
KETONES UR-MCNC: NEGATIVE — SIGNIFICANT CHANGE UP
LEUKOCYTE ESTERASE UR-ACNC: ABNORMAL
MAGNESIUM SERPL-MCNC: 2 MG/DL — SIGNIFICANT CHANGE UP (ref 1.6–2.6)
MCHC RBC-ENTMCNC: 28.7 PG — SIGNIFICANT CHANGE UP (ref 27–34)
MCHC RBC-ENTMCNC: 30 PG — SIGNIFICANT CHANGE UP (ref 27–34)
MCHC RBC-ENTMCNC: 31.4 GM/DL — LOW (ref 32–36)
MCHC RBC-ENTMCNC: 31.8 GM/DL — LOW (ref 32–36)
MCV RBC AUTO: 91.5 FL — SIGNIFICANT CHANGE UP (ref 80–100)
MCV RBC AUTO: 94.2 FL — SIGNIFICANT CHANGE UP (ref 80–100)
NITRITE UR-MCNC: NEGATIVE — SIGNIFICANT CHANGE UP
NRBC # BLD: 0 /100 WBCS — SIGNIFICANT CHANGE UP (ref 0–0)
NRBC # BLD: 0 /100 WBCS — SIGNIFICANT CHANGE UP (ref 0–0)
PH UR: 5.5 — SIGNIFICANT CHANGE UP (ref 5–8)
PLATELET # BLD AUTO: 272 K/UL — SIGNIFICANT CHANGE UP (ref 150–400)
PLATELET # BLD AUTO: 310 K/UL — SIGNIFICANT CHANGE UP (ref 150–400)
POTASSIUM SERPL-MCNC: 3.6 MMOL/L — SIGNIFICANT CHANGE UP (ref 3.5–5.3)
POTASSIUM SERPL-SCNC: 3.6 MMOL/L — SIGNIFICANT CHANGE UP (ref 3.5–5.3)
PROT UR-MCNC: 100 MG/DL
RBC # BLD: 2.07 M/UL — LOW (ref 3.8–5.2)
RBC # BLD: 2.82 M/UL — LOW (ref 3.8–5.2)
RBC # FLD: 15.6 % — HIGH (ref 10.3–14.5)
RBC # FLD: 16.9 % — HIGH (ref 10.3–14.5)
RETICS #: 83.6 K/UL — SIGNIFICANT CHANGE UP (ref 25–125)
RETICS/RBC NFR: 4 % — HIGH (ref 0.5–2.5)
RH IG SCN BLD-IMP: POSITIVE — SIGNIFICANT CHANGE UP
SODIUM SERPL-SCNC: 144 MMOL/L — SIGNIFICANT CHANGE UP (ref 135–145)
SP GR SPEC: 1.02 — SIGNIFICANT CHANGE UP (ref 1–1.03)
SPECIMEN SOURCE: SIGNIFICANT CHANGE UP
SPECIMEN SOURCE: SIGNIFICANT CHANGE UP
UROBILINOGEN FLD QL: 1 E.U./DL — SIGNIFICANT CHANGE UP
WBC # BLD: 10.62 K/UL — HIGH (ref 3.8–10.5)
WBC # BLD: 15.94 K/UL — HIGH (ref 3.8–10.5)
WBC # FLD AUTO: 10.62 K/UL — HIGH (ref 3.8–10.5)
WBC # FLD AUTO: 15.94 K/UL — HIGH (ref 3.8–10.5)

## 2019-05-18 PROCEDURE — 99291 CRITICAL CARE FIRST HOUR: CPT

## 2019-05-18 PROCEDURE — 71045 X-RAY EXAM CHEST 1 VIEW: CPT | Mod: 26

## 2019-05-18 PROCEDURE — 99231 SBSQ HOSP IP/OBS SF/LOW 25: CPT

## 2019-05-18 RX ORDER — ROBINUL 0.2 MG/ML
0.2 INJECTION INTRAMUSCULAR; INTRAVENOUS ONCE
Refills: 0 | Status: DISCONTINUED | OUTPATIENT
Start: 2019-05-18 | End: 2019-05-21

## 2019-05-18 RX ORDER — POTASSIUM CHLORIDE 20 MEQ
40 PACKET (EA) ORAL ONCE
Refills: 0 | Status: COMPLETED | OUTPATIENT
Start: 2019-05-18 | End: 2019-05-18

## 2019-05-18 RX ORDER — ACETAMINOPHEN 500 MG
650 TABLET ORAL ONCE
Refills: 0 | Status: COMPLETED | OUTPATIENT
Start: 2019-05-18 | End: 2019-05-18

## 2019-05-18 RX ADMIN — INSULIN GLARGINE 15 UNIT(S): 100 INJECTION, SOLUTION SUBCUTANEOUS at 22:40

## 2019-05-18 RX ADMIN — INSULIN HUMAN 4 UNIT(S): 100 INJECTION, SOLUTION SUBCUTANEOUS at 06:33

## 2019-05-18 RX ADMIN — Medication 650 MILLIGRAM(S): at 12:10

## 2019-05-18 RX ADMIN — Medication 650 MILLIGRAM(S): at 11:32

## 2019-05-18 RX ADMIN — INSULIN HUMAN 4 UNIT(S): 100 INJECTION, SOLUTION SUBCUTANEOUS at 18:02

## 2019-05-18 RX ADMIN — ZINC OXIDE 1 APPLICATION(S): 200 OINTMENT TOPICAL at 12:05

## 2019-05-18 RX ADMIN — ENOXAPARIN SODIUM 60 MILLIGRAM(S): 100 INJECTION SUBCUTANEOUS at 06:32

## 2019-05-18 RX ADMIN — SENNA PLUS 2 TABLET(S): 8.6 TABLET ORAL at 22:40

## 2019-05-18 RX ADMIN — INSULIN HUMAN 2: 100 INJECTION, SOLUTION SUBCUTANEOUS at 06:33

## 2019-05-18 RX ADMIN — NYSTATIN CREAM 1 APPLICATION(S): 100000 CREAM TOPICAL at 14:12

## 2019-05-18 RX ADMIN — ZINC OXIDE 1 APPLICATION(S): 200 OINTMENT TOPICAL at 06:37

## 2019-05-18 RX ADMIN — INSULIN HUMAN 4 UNIT(S): 100 INJECTION, SOLUTION SUBCUTANEOUS at 12:29

## 2019-05-18 RX ADMIN — INSULIN HUMAN 2: 100 INJECTION, SOLUTION SUBCUTANEOUS at 18:01

## 2019-05-18 RX ADMIN — NYSTATIN CREAM 1 APPLICATION(S): 100000 CREAM TOPICAL at 22:53

## 2019-05-18 RX ADMIN — NYSTATIN CREAM 1 APPLICATION(S): 100000 CREAM TOPICAL at 06:37

## 2019-05-18 RX ADMIN — PANTOPRAZOLE SODIUM 40 MILLIGRAM(S): 20 TABLET, DELAYED RELEASE ORAL at 12:28

## 2019-05-18 RX ADMIN — Medication 1 APPLICATION(S): at 22:53

## 2019-05-18 RX ADMIN — MIDODRINE HYDROCHLORIDE 15 MILLIGRAM(S): 2.5 TABLET ORAL at 06:32

## 2019-05-18 RX ADMIN — CHLORHEXIDINE GLUCONATE 15 MILLILITER(S): 213 SOLUTION TOPICAL at 10:34

## 2019-05-18 RX ADMIN — CHLORHEXIDINE GLUCONATE 1 APPLICATION(S): 213 SOLUTION TOPICAL at 06:31

## 2019-05-18 RX ADMIN — ZINC OXIDE 1 APPLICATION(S): 200 OINTMENT TOPICAL at 22:53

## 2019-05-18 RX ADMIN — ENOXAPARIN SODIUM 60 MILLIGRAM(S): 100 INJECTION SUBCUTANEOUS at 18:00

## 2019-05-18 RX ADMIN — Medication 40 MILLIEQUIVALENT(S): at 07:27

## 2019-05-18 RX ADMIN — Medication 100 MILLIGRAM(S): at 12:19

## 2019-05-18 RX ADMIN — MIDODRINE HYDROCHLORIDE 15 MILLIGRAM(S): 2.5 TABLET ORAL at 22:40

## 2019-05-18 RX ADMIN — MIDODRINE HYDROCHLORIDE 15 MILLIGRAM(S): 2.5 TABLET ORAL at 14:11

## 2019-05-18 RX ADMIN — CHLORHEXIDINE GLUCONATE 15 MILLILITER(S): 213 SOLUTION TOPICAL at 22:40

## 2019-05-18 RX ADMIN — Medication 1 APPLICATION(S): at 14:12

## 2019-05-18 NOTE — PROGRESS NOTE ADULT - SUBJECTIVE AND OBJECTIVE BOX
INTERVAL HPI/OVERNIGHT EVENTS:    SUBJECTIVE: Patient seen and examined at bedside.     CONSTITUTIONAL: No weakness, fevers or chills  EYES/ENT: No visual changes;  No vertigo or throat pain   NECK: No pain or stiffness  RESPIRATORY: No cough, wheezing, hemoptysis; No shortness of breath  CARDIOVASCULAR: No chest pain or palpitations  GASTROINTESTINAL: No abdominal or epigastric pain. No nausea, vomiting, or hematemesis; No diarrhea or constipation. No melena or hematochezia.  GENITOURINARY: No dysuria, frequency or hematuria  NEUROLOGICAL: No numbness or weakness  SKIN: No itching, rashes    OBJECTIVE:    VITAL SIGNS:  ICU Vital Signs Last 24 Hrs  T(C): 36.6 (18 May 2019 01:03), Max: 38.5 (17 May 2019 11:28)  T(F): 97.9 (18 May 2019 01:03), Max: 101.3 (17 May 2019 11:28)  HR: 66 (18 May 2019 05:00) (65 - 85)  BP: 87/53 (18 May 2019 05:00) (80/43 - 135/59)  BP(mean): 73 (18 May 2019 05:00) (59 - 95)  ABP: --  ABP(mean): --  RR: 12 (18 May 2019 05:00) (12 - 37)  SpO2: 100% (18 May 2019 05:00) (98% - 100%)    Mode: AC/ CMV (Assist Control/ Continuous Mandatory Ventilation), RR (machine): 12, TV (machine): 320, FiO2: 40, PEEP: 5, ITime: 1, MAP: 7.9, PIP: 20     @ 07: @ 07:00  --------------------------------------------------------  IN: 2367 mL / OUT: 800 mL / NET: 1567 mL     @ 07:01  -  18 @ 05:34  --------------------------------------------------------  IN: 1317 mL / OUT: 1205 mL / NET: 112 mL      CAPILLARY BLOOD GLUCOSE      POCT Blood Glucose.: 168 mg/dL (17 May 2019 21:13)      PHYSICAL EXAM:    General: NAD  HEENT: NC/AT; PERRL, clear conjunctiva  Neck: supple  Respiratory: CTA b/l  Cardiovascular: +S1/S2; RRR  Abdomen: soft, NT/ND; +BS x4  Extremities: WWP, 2+ peripheral pulses b/l; no LE edema  Skin: normal color and turgor; no rash  Neurological:    MEDICATIONS:  MEDICATIONS  (STANDING):  albumin human  5% IVPB 2750 milliLiter(s) IV Intermittent once  calcium gluconate IVPB 1 Gram(s) IV Intermittent once  chlorhexidine 0.12% Liquid 15 milliLiter(s) Oral Mucosa every 12 hours  chlorhexidine 2% Cloths 1 Application(s) Topical <User Schedule>  dextrose 5%. 1000 milliLiter(s) (50 mL/Hr) IV Continuous <Continuous>  dextrose 50% Injectable 25 Gram(s) IV Push once  docusate sodium Liquid 100 milliGRAM(s) Oral daily  enoxaparin Injectable 60 milliGRAM(s) SubCutaneous every 12 hours  Heparin injectable 1000 units/mL 2000 Unit(s),Heparin injectable 1000 units/mL 2000 Unit(s) 2000 Unit(s) IV Push once  Heparin injectable 1000 units/mL 2000 Unit(s),Heparin injectable 1000 units/mL 2000 Unit(s) 2000 Unit(s) IV Push once  insulin glargine Injectable (LANTUS) 15 Unit(s) SubCutaneous at bedtime  insulin regular  human corrective regimen sliding scale   SubCutaneous every 6 hours  insulin regular  human recombinant 4 Unit(s) SubCutaneous every 6 hours  midodrine 15 milliGRAM(s) Oral every 8 hours  norepinephrine Infusion 0.05 MICROgram(s)/kG/Min (5.466 mL/Hr) IV Continuous <Continuous>  nystatin Powder 1 Application(s) Topical three times a day  pantoprazole   Suspension 40 milliGRAM(s) Oral daily  petrolatum Ophthalmic Ointment 1 Application(s) Both EYES three times a day  senna 2 Tablet(s) Oral at bedtime  sodium chloride 0.9% Bolus 500 milliLiter(s) IV Bolus once  zinc oxide 20% Ointment 1 Application(s) Topical three times a day    MEDICATIONS  (PRN):  dextrose 40% Gel 15 Gram(s) Oral once PRN Blood Glucose LESS THAN 70 milliGRAM(s)/deciliter  glucagon  Injectable 1 milliGRAM(s) IntraMuscular once PRN Glucose LESS THAN 70 milligrams/deciliter      ALLERGIES:  Allergies    No Known Allergies    Intolerances    IVIG PRODUCT IS PRIGIVEN OR GAMMUNEX (Unknown)      LABS:                        7.0    14.07 )-----------( 369      ( 17 May 2019 19:32 )             22.1         142  |  102  |  20  ----------------------------<  202<H>  4.0   |  30  |  0.45<L>    Ca    9.5      17 May 2019 03:54  Mg     2.1             Urinalysis Basic - ( 18 May 2019 02:11 )    Color: Yellow / Appearance: Hazy / S.020 / pH: x  Gluc: x / Ketone: NEGATIVE  / Bili: Negative / Urobili: 1.0 E.U./dL   Blood: x / Protein: 100 mg/dL / Nitrite: NEGATIVE   Leuk Esterase: Large / RBC: < 5 /HPF / WBC Many /HPF   Sq Epi: x / Non Sq Epi: 0-5 /HPF / Bacteria: Present /HPF        RADIOLOGY & ADDITIONAL TESTS: Reviewed. INTERVAL HPI/OVERNIGHT EVENTS: KEHSAWN    SUBJECTIVE: Patient seen and examined at bedside. Pt comfortable this morning. Appears stronger. Wanted to speak with her sister about blood consent. ROS negative     OBJECTIVE:    VITAL SIGNS:  ICU Vital Signs Last 24 Hrs  T(C): 36.6 (18 May 2019 01:03), Max: 38.5 (17 May 2019 11:28)  T(F): 97.9 (18 May 2019 01:03), Max: 101.3 (17 May 2019 11:28)  HR: 66 (18 May 2019 05:00) (65 - 85)  BP: 87/53 (18 May 2019 05:00) (80/43 - 135/59)  BP(mean): 73 (18 May 2019 05:00) (59 - 95)  ABP: --  ABP(mean): --  RR: 12 (18 May 2019 05:00) (12 - 37)  SpO2: 100% (18 May 2019 05:00) (98% - 100%)    Mode: AC/ CMV (Assist Control/ Continuous Mandatory Ventilation), RR (machine): 12, TV (machine): 320, FiO2: 40, PEEP: 5, ITime: 1, MAP: 7.9, PIP: 20    05-16 @ 07:01  -  17 @ 07:00  --------------------------------------------------------  IN: 2367 mL / OUT: 800 mL / NET: 1567 mL     @ 07:  -  18 @ 05:34  --------------------------------------------------------  IN: 1317 mL / OUT: 1205 mL / NET: 112 mL      CAPILLARY BLOOD GLUCOSE      POCT Blood Glucose.: 168 mg/dL (17 May 2019 21:13)      PHYSICAL EXAM:    General: NAD, comfortable on CPAP trial   HEENT: NCAT, PERRL, clear conjunctiva, no scleral icterus  Neck: supple, no JVD, R HD cath in place  Respiratory: good air entry bilaterally, Mild rhonchi bilaterally.  Cardiovascular: RRR, normal S1S2, no M/R/G  Vascular: 1+ radial and DP pulses  Abdomen: soft, NT/ND, bowel sounds present, L colostomy w pink stoma, no palpable masses  Extremities: cool to touch, no clubbing, cyanosis, or edema  Skin: No rashes present  Neuro: 1/5 strength in feet, 1/5 in legs b/l, 4/5 strength in upper extremities. No focal deficits    MEDICATIONS:    MEDICATIONS  (STANDING):  albumin human  5% IVPB 2750 milliLiter(s) IV Intermittent once  calcium gluconate IVPB 1 Gram(s) IV Intermittent once  chlorhexidine 0.12% Liquid 15 milliLiter(s) Oral Mucosa every 12 hours  chlorhexidine 2% Cloths 1 Application(s) Topical <User Schedule>  dextrose 5%. 1000 milliLiter(s) (50 mL/Hr) IV Continuous <Continuous>  dextrose 50% Injectable 25 Gram(s) IV Push once  docusate sodium Liquid 100 milliGRAM(s) Oral daily  enoxaparin Injectable 60 milliGRAM(s) SubCutaneous every 12 hours  Heparin injectable 1000 units/mL 2000 Unit(s),Heparin injectable 1000 units/mL 2000 Unit(s) 2000 Unit(s) IV Push once  Heparin injectable 1000 units/mL 2000 Unit(s),Heparin injectable 1000 units/mL 2000 Unit(s) 2000 Unit(s) IV Push once  insulin glargine Injectable (LANTUS) 15 Unit(s) SubCutaneous at bedtime  insulin regular  human corrective regimen sliding scale   SubCutaneous every 6 hours  insulin regular  human recombinant 4 Unit(s) SubCutaneous every 6 hours  midodrine 15 milliGRAM(s) Oral every 8 hours  norepinephrine Infusion 0.05 MICROgram(s)/kG/Min (5.466 mL/Hr) IV Continuous <Continuous>  nystatin Powder 1 Application(s) Topical three times a day  pantoprazole   Suspension 40 milliGRAM(s) Oral daily  petrolatum Ophthalmic Ointment 1 Application(s) Both EYES three times a day  senna 2 Tablet(s) Oral at bedtime  sodium chloride 0.9% Bolus 500 milliLiter(s) IV Bolus once  zinc oxide 20% Ointment 1 Application(s) Topical three times a day    MEDICATIONS  (PRN):  dextrose 40% Gel 15 Gram(s) Oral once PRN Blood Glucose LESS THAN 70 milliGRAM(s)/deciliter  glucagon  Injectable 1 milliGRAM(s) IntraMuscular once PRN Glucose LESS THAN 70 milligrams/deciliter      ALLERGIES:  Allergies    No Known Allergies    Intolerances    IVIG PRODUCT IS PRIGIVEN OR GAMMUNEX (Unknown)      LABS:                        7.0    14.07 )-----------( 369      ( 17 May 2019 19:32 )             22.1     -    142  |  102  |  20  ----------------------------<  202<H>  4.0   |  30  |  0.45<L>    Ca    9.5      17 May 2019 03:54  Mg     2.1             Urinalysis Basic - ( 18 May 2019 02:11 )    Color: Yellow / Appearance: Hazy / S.020 / pH: x  Gluc: x / Ketone: NEGATIVE  / Bili: Negative / Urobili: 1.0 E.U./dL   Blood: x / Protein: 100 mg/dL / Nitrite: NEGATIVE   Leuk Esterase: Large / RBC: < 5 /HPF / WBC Many /HPF   Sq Epi: x / Non Sq Epi: 0-5 /HPF / Bacteria: Present /HPF        RADIOLOGY & ADDITIONAL TESTS: Reviewed.

## 2019-05-18 NOTE — PROGRESS NOTE ADULT - ASSESSMENT
60yo F HD20  with stage IV endometrial adenocarcinoma s/p staging surgery '18 and 1 round of chemotherapy 2/19 readmitted from [facility] with fever, previously admitted for management of symptomatic rectovaginal and enterovaginal fistulas. Presents on referral from [facility] with urosepsis. Rapid response called due to worsening respiratory status s/p intubation and MICU admission. She then was on regional GYN service however had respiratory weakness and was transferred to Medicine/tele HD9 until requiring intubation again HD10 and is now in MICU. 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 She has been on plasmapheresis. She as re-intubated on 5/13 due to respiratory distress. She was made DNR/DNI 5/14. 5/17 found upper extremity DVT.   Management per MICU.    1. Neuro: weakness secondary Guillain [place] versus exacerbation of AIDP- now status post IVIG x4 day course. Neurology following-appreciate recs. Patient has likely failed IVIG treatment given current status. Now undergoing plasmapheresis.  2. Pulm: Currently intubated, was Extubated 5/10-transitioned to BiPAP initially, then on nasal canula 4L. re-intubated 5/13 due to respiratory distress,  now DNR/DNI. tolerating CPAP trails daily  3. Cardio: again on pressor support as needed; increased this afternoon due to hypotension 70s/40s  4. FEN/GI: Dobhoff in situ, on tube feeds. repletion electrolytes prn  5. : Adequate urine output, Joseph in place.  - Joseph placed 5/8 after finding of overflow incontinence, replaced joseph 5/17 for repeat UCX, f/u urology recs regarding suprapubic catheter   6. ID: fever of 101.3 at 1130am on 5/17, blood cultures obtained, UCx taken 5/17 evening after placement of new joseph  -s/p ertapenem ended 5/14, s/p meropenem, s/p vanc, appreciate ID recs.   7. Endocrine: FS, ISS, Lantus 8u qhs started 5/14, Metformin 1000mg BID held at this time   8. VTE prophylaxis - SCDs, Lovenox 60mg BID given upper extremity DVT noted on 5/17   9. GYN malignancy  -Stopped anastrazole and initiated 3 weeks of megace 80mg BID followed by 3 weeks of tamoxifen.   - Started megace and metformin 4/10, was on Tamoxifen for 21 days (5/1/19-  5/17)- stopped given upper extremity DVT , once patient recovers from acute neurologic issue will reassess cancer treatment options   -  Avoid immunotherapy at this time given associated risk of autoimmune disease   10. Derm: monitor sacrum for s/s of pressure ulcer, now stage 2  11. PT/OT needs evaluation, OOB with nursing staff  12. Social work/palliative: DNR/DNI

## 2019-05-18 NOTE — PROGRESS NOTE ADULT - ASSESSMENT
60yo F HD20  with stage IV endometrial adenocarcinoma s/p staging surgery '18 and 1 round of chemotherapy 2/19 readmitted from [facility] with fever, previously admitted for management of symptomatic rectovaginal and enterovaginal fistulas. Presents on referral from [facility] with urosepsis. Rapid response called due to worsening respiratory status s/p intubation and MICU admission. She then was on regional GYN service however had respiratory weakness and was transferred to Medicine/tele HD9 until requiring intubation again HD10 and is now in MICU. 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 She has been on plasmapheresis. She as re-intubated on 5/13 due to respiratory distress. She was made DNR/DNI 5/14.   Management per MICU.    1. Neuro: weakness secondary Guillain [place] versus exacerbation of AIDP- now status post IVIG x4 day course. Neurology following-appreciate recs. Patient has likely failed IVIG treatment given current status. Now undergoing plasmapheresis.  2. Pulm: Currently intubated, was Extubated 5/10-transitioned to BiPAP initially, then on nasal canula 4L. re-intubated 5/13 due to respiratory distress,  now DNR/DNI. tolerating CPAP trails daily  3. Cardio: again on pressor support as needed   4. FEN/GI: Dobhoff in situ, on tube feeds. repletion electrolytes prn  5. : Adequate urine output, Joseph in place.  - Joseph replaced 5/8 after finding of overflow incontinence, replaced joseph 5/17 for repeat UCX, f/u urology recs regarding suprapubic catheter   6. ID: fever of 101.3 at 1130am on 5/17, blood cultures obtained, UCx taken 5/17 evening after placement of new joseph  -s/p ertapenem ended 5/14, s/p meropenem, s/p vanc, appreciate ID recs.   7. Endocrine: FS, ISS, Lantus 8u qhs started 5/14, Metformin 1000mg BID held at this time   8. VTE prophylaxis - SCDs, Lovenox 40mg daily   9. GYN malignancy  -Stopped anastrazole and initiated 3 weeks of megace 80mg BID followed by 3 weeks of tamoxifen.   - Started megace and metformin 4/10, was on Tamoxifen for 21 days (5/1/19-  5/17)- stopped given upper extremity DVT , once patient recovers from acute neurologic issue will reassess cancer treatment options   -  Avoid immunotherapy at this time given associated risk of autoimmune disease   10. Derm: monitor sacrum for s/s of pressure ulcer, now stage 2  11. PT/OT needs evaluation, OOB with nursing staff  12. Social work/palliative: DNR/DNI 60yo F HD20  with stage IV endometrial adenocarcinoma s/p staging surgery '18 and 1 round of chemotherapy 2/19 readmitted from [facility] with fever, previously admitted for management of symptomatic rectovaginal and enterovaginal fistulas. Presents on referral from [facility] with urosepsis. Rapid response called due to worsening respiratory status s/p intubation and MICU admission. She then was on regional GYN service however had respiratory weakness and was transferred to Medicine/tele HD9 until requiring intubation again HD10 and is now in MICU. 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 She has been on plasmapheresis. She as re-intubated on 5/13 due to respiratory distress. She was made DNR/DNI 5/14. 5/17 found upper extremity DVT.   Management per MICU.    1. Neuro: weakness secondary Guillain [place] versus exacerbation of AIDP- now status post IVIG x4 day course. Neurology following-appreciate recs. Patient has likely failed IVIG treatment given current status. Now undergoing plasmapheresis.  2. Pulm: Currently intubated, was Extubated 5/10-transitioned to BiPAP initially, then on nasal canula 4L. re-intubated 5/13 due to respiratory distress,  now DNR/DNI. tolerating CPAP trails daily  3. Cardio: again on pressor support as needed   4. FEN/GI: Dobhoff in situ, on tube feeds. repletion electrolytes prn  5. : Adequate urine output, Joseph in place.  - Joseph placed 5/8 after finding of overflow incontinence, replaced joseph 5/17 for repeat UCX, f/u urology recs regarding suprapubic catheter   6. ID: fever of 101.3 at 1130am on 5/17, blood cultures obtained, UCx taken 5/17 evening after placement of new joseph  -s/p ertapenem ended 5/14, s/p meropenem, s/p vanc, appreciate ID recs.   7. Endocrine: FS, ISS, Lantus 8u qhs started 5/14, Metformin 1000mg BID held at this time   8. VTE prophylaxis - SCDs, Lovenox 60mg BID given upper extremity DVT noted on 5/17   9. GYN malignancy  -Stopped anastrazole and initiated 3 weeks of megace 80mg BID followed by 3 weeks of tamoxifen.   - Started megace and metformin 4/10, was on Tamoxifen for 21 days (5/1/19-  5/17)- stopped given upper extremity DVT , once patient recovers from acute neurologic issue will reassess cancer treatment options   -  Avoid immunotherapy at this time given associated risk of autoimmune disease   10. Derm: monitor sacrum for s/s of pressure ulcer, now stage 2  11. PT/OT needs evaluation, OOB with nursing staff  12. Social work/palliative: DNR/DNI

## 2019-05-18 NOTE — PROGRESS NOTE ADULT - SUBJECTIVE AND OBJECTIVE BOX
Patient is a 59y old  Female who presents with a chief complaint of sepsis (18 May 2019 05:34)        INTERVAL HPI/OVERNIGHT EVENTS: none    SYMPTOMS no pain or sob    DRIPS none    Mode: AC/ CMV (Assist Control/ Continuous Mandatory Ventilation)  RR (machine): 12  TV (machine): 320  FiO2: 40  PEEP: 5  ITime: 1  MAP: 7.8  PIP: 19      ICU Vital Signs Last 24 Hrs  T(C): 38.3 (18 May 2019 09:28), Max: 38.3 (18 May 2019 09:28)  T(F): 101 (18 May 2019 09:28), Max: 101 (18 May 2019 09:28)  HR: 72 (18 May 2019 11:03) (65 - 85)  BP: 97/51 (18 May 2019 10:00) (87/45 - 135/59)  BP(mean): 70 (18 May 2019 10:00) (60 - 95)  ABP: --  ABP(mean): --  RR: 13 (18 May 2019 11:03) (12 - 33)  SpO2: 100% (18 May 2019 11:03) (93% - 100%)      I&O's Summary    17 May 2019 07:01  -  18 May 2019 07:00  --------------------------------------------------------  IN: 1464 mL / OUT: 1315 mL / NET: 149 mL    18 May 2019 07:01  -  18 May 2019 12:29  --------------------------------------------------------  IN: 167 mL / OUT: 30 mL / NET: 137 mL        EXAM    Chest clear    Heart rr    Abdomen soft nontender    Extremities trace edma    Neuro awake alert, increaaed strength in extremities      LABS:                            6.2    10.62 )-----------( 272      ( 18 May 2019 06:06 )             19.5     05-18    144  |  103  |  19  ----------------------------<  200<H>  3.6   |  30  |  0.43<L>    Ca    9.0      18 May 2019 06:06  Mg     2.0             Urinalysis Basic - ( 18 May 2019 02:11 )    Color: Yellow / Appearance: Hazy / S.020 / pH: x  Gluc: x / Ketone: NEGATIVE  / Bili: Negative / Urobili: 1.0 E.U./dL   Blood: x / Protein: 100 mg/dL / Nitrite: NEGATIVE   Leuk Esterase: Large / RBC: < 5 /HPF / WBC Many /HPF   Sq Epi: x / Non Sq Epi: 0-5 /HPF / Bacteria: Present /HPF      Lactate, Blood: 0.9 mmoL/L ( @ 13:18)    FS < 200    RADIOLOGY & ADDITIONAL STUDIES:    CRITICAL CARE TIME SPENT:33

## 2019-05-18 NOTE — PROGRESS NOTE ADULT - SUBJECTIVE AND OBJECTIVE BOX
GYN Progress Note    Patient seen at bedside this morning, resting comfortable in bed. Able to answer questions, reports pain in sacral region, improves in certain positions. Denies pain/discomfort else where.   [place] in place.       Vital Signs Last 24 Hrs  T(C): 36.6 (18 May 2019 01:03), Max: 38.5 (17 May 2019 11:28)  T(F): 97.9 (18 May 2019 01:03), Max: 101.3 (17 May 2019 11:28)  HR: 66 (18 May 2019 05:00) (65 - 85)  BP: 87/53 (18 May 2019 05:00) (80/43 - 135/59)  BP(mean): 73 (18 May 2019 05:00) (59 - 95)  RR: 12 (18 May 2019 05:00) (12 - 37)  SpO2: 100% (18 May 2019 05:00) (98% - 100%)    Physical Exam:  Gen: No Acute Distress, resting comfortable in bed, answering questions by pointing/ nodding head   Pulm: intubated, rhonchi bilaterally  GI: soft, non tender, nondistended, +BS, no rebound, no guarding, ostomy pink with brown stool output   Ext: SCDs in place, no edema/erythema/tenderness    I&O's Summary    16 May 2019 07:01  -  17 May 2019 07:00  --------------------------------------------------------  IN: 2367 mL / OUT: 800 mL / NET: 1567 mL    17 May 2019 07:01  -  18 May 2019 05:21  --------------------------------------------------------  IN: 1317 mL / OUT: 1205 mL / NET: 112 mL      MEDICATIONS  (STANDING):  albumin human  5% IVPB 2750 milliLiter(s) IV Intermittent once  calcium gluconate IVPB 1 Gram(s) IV Intermittent once  chlorhexidine 0.12% Liquid 15 milliLiter(s) Oral Mucosa every 12 hours  chlorhexidine 2% Cloths 1 Application(s) Topical <User Schedule>  dextrose 5%. 1000 milliLiter(s) (50 mL/Hr) IV Continuous <Continuous>  dextrose 50% Injectable 25 Gram(s) IV Push once  docusate sodium Liquid 100 milliGRAM(s) Oral daily  enoxaparin Injectable 60 milliGRAM(s) SubCutaneous every 12 hours  Heparin injectable 1000 units/mL 2000 Unit(s),Heparin injectable 1000 units/mL 2000 Unit(s) 2000 Unit(s) IV Push once  Heparin injectable 1000 units/mL 2000 Unit(s),Heparin injectable 1000 units/mL 2000 Unit(s) 2000 Unit(s) IV Push once  insulin glargine Injectable (LANTUS) 15 Unit(s) SubCutaneous at bedtime  insulin regular  human corrective regimen sliding scale   SubCutaneous every 6 hours  insulin regular  human recombinant 4 Unit(s) SubCutaneous every 6 hours  midodrine 15 milliGRAM(s) Oral every 8 hours  norepinephrine Infusion 0.05 MICROgram(s)/kG/Min (5.466 mL/Hr) IV Continuous <Continuous>  nystatin Powder 1 Application(s) Topical three times a day  pantoprazole   Suspension 40 milliGRAM(s) Oral daily  petrolatum Ophthalmic Ointment 1 Application(s) Both EYES three times a day  senna 2 Tablet(s) Oral at bedtime  sodium chloride 0.9% Bolus 500 milliLiter(s) IV Bolus once  zinc oxide 20% Ointment 1 Application(s) Topical three times a day    MEDICATIONS  (PRN):  dextrose 40% Gel 15 Gram(s) Oral once PRN Blood Glucose LESS THAN 70 milliGRAM(s)/deciliter  glucagon  Injectable 1 milliGRAM(s) IntraMuscular once PRN Glucose LESS THAN 70 milligrams/deciliter      LABS:                        7.0    14.07 )-----------( 369      ( 17 May 2019 19:32 )             22.1     05-17    142  |  102  |  20  ----------------------------<  202<H>  4.0   |  30  |  0.45<L>    Ca    9.5      17 May 2019 03:54  Phos  3.2     05-16  Mg     2.1     05-17      PT/INR - ( 16 May 2019 05:25 )   PT: 12.8 sec;   INR: 1.13          PTT - ( 16 May 2019 05:25 )  PTT:33.6 sec  Urinalysis Basic - ( 18 May 2019 02:11 )    Color: Yellow / Appearance: Hazy / S.020 / pH: x  Gluc: x / Ketone: NEGATIVE  / Bili: Negative / Urobili: 1.0 E.U./dL   Blood: x / Protein: 100 mg/dL / Nitrite: NEGATIVE   Leuk Esterase: Large / RBC: < 5 /HPF / WBC Many /HPF   Sq Epi: x / Non Sq Epi: 0-5 /HPF / Bacteria: Present /HPF

## 2019-05-18 NOTE — PROGRESS NOTE ADULT - SUBJECTIVE AND OBJECTIVE BOX
Pt seen and examined.    Pt still intubated, no new complaints    PMHX:  SEPSIS, DUE TO UNSPECI  Yes  Handoff  MEWS Score  Diabetes  Gait disorder  Breast CA  Fistula  Pleural effusion  Muscle weakness  Malignant neoplasm  Sepsis, due to unspecified organism  Gram-negative bacteremia  Acute inflammatory demyelinating polyneuropathy  Advance care planning  Acute respiratory failure requiring reintubation  Transition of care performed with sharing of clinical summary  Septic shock due to Escherichia coli  Acute hypercapnic respiratory failure  Endometrial adenocarcinoma  Post-polio syndrome  Prophylactic measure  History of breast cancer  Urinary retention  Ovarian malignant neoplasm  Sepsis secondary to UTI  CIDP (chronic inflammatory demyelinating polyneuropathy)  Ventricular tachycardia, non-sustained  Hypomagnesemia  Ventricular tachycardia  Enterovaginal fistula  Type 2 diabetes mellitus without complication, without long-term current use of insulin  Pulmonary hypertension  Atelectasis  Pleural effusion  Pyelonephritis  Bacteremia  Sepsis, due to unspecified organism  Endometrial carcinoma  Urinary tract infection  Palliative care by specialist  Goals of care, counseling/discussion  Debility  Neurogenic bladder  Fistula  Malignant neoplasm  Acute hypoxemic respiratory failure  Septic shock  US guided central cath  Tracheal intubation  Insertion, feeding tube, Dobhoff  History of total abdominal hysterectomy and bilateral salpingo-oophorectomy  VOMITING  Chronic inflammatory demyelinating polyneuropathy  Post-polio syndrome  13  Breast CA  Malignant neoplasm      MEDS:  albumin human  5% IVPB 2750 milliLiter(s) IV Intermittent once  calcium gluconate IVPB 1 Gram(s) IV Intermittent once  chlorhexidine 0.12% Liquid 15 milliLiter(s) Oral Mucosa every 12 hours  chlorhexidine 2% Cloths 1 Application(s) Topical <User Schedule>  dextrose 40% Gel 15 Gram(s) Oral once PRN  dextrose 5%. 1000 milliLiter(s) IV Continuous <Continuous>  dextrose 50% Injectable 25 Gram(s) IV Push once  docusate sodium Liquid 100 milliGRAM(s) Oral daily  enoxaparin Injectable 60 milliGRAM(s) SubCutaneous every 12 hours  glucagon  Injectable 1 milliGRAM(s) IntraMuscular once PRN  Heparin injectable 1000 units/mL 2000 Unit(s),Heparin injectable 1000 units/mL 2000 Unit(s) 2000 Unit(s) IV Push once  Heparin injectable 1000 units/mL 2000 Unit(s),Heparin injectable 1000 units/mL 2000 Unit(s) 2000 Unit(s) IV Push once  insulin glargine Injectable (LANTUS) 15 Unit(s) SubCutaneous at bedtime  insulin regular  human corrective regimen sliding scale   SubCutaneous every 6 hours  insulin regular  human recombinant 4 Unit(s) SubCutaneous every 6 hours  midodrine 15 milliGRAM(s) Oral every 8 hours  norepinephrine Infusion 0.05 MICROgram(s)/kG/Min IV Continuous <Continuous>  nystatin Powder 1 Application(s) Topical three times a day  pantoprazole   Suspension 40 milliGRAM(s) Oral daily  petrolatum Ophthalmic Ointment 1 Application(s) Both EYES three times a day  senna 2 Tablet(s) Oral at bedtime  sodium chloride 0.9% Bolus 500 milliLiter(s) IV Bolus once  zinc oxide 20% Ointment 1 Application(s) Topical three times a day      Vitals:  T(C): 37.9 (05-18-19 @ 13:06), Max: 38.3 (05-18-19 @ 09:28)  HR: 66 (05-18-19 @ 13:30) (65 - 85)  BP: 91/49 (05-18-19 @ 13:30) (77/43 - 135/59)  RR: 13 (05-18-19 @ 13:30) (12 - 30)  SpO2: 100% (05-18-19 @ 13:30) (93% - 100%)    Exam:    Seems to continue to have had some mild improvement in neuro exam. Still 4/5 in BL UEs and 2/5 in BL hip flexors but with some partial antigravity hip flexion that was not noted before.    AP: cont PLEX. vent as per primary team. will follow

## 2019-05-18 NOTE — PROGRESS NOTE ADULT - SUBJECTIVE AND OBJECTIVE BOX
Pt seen and examined at bedside. Pt shakes head "no" when asked if she is in pain.  West[place] in place.       T(F): 99.1 (19 @ 22:23), Max: 101 (19 @ 09:28)  HR: 82 (19 @ 00:22) (66 - 90)  BP: 118/74 (19 @ 23:00) (77/43 - 119/75)  RR: 15 (19 @ 00:22) (12 - 43)  SpO2: 100% (19 @ 00:22) (93% - 100%)  Wt(kg): --  I&O's Summary    17 May 2019 07:  -  18 May 2019 07:00  --------------------------------------------------------  IN: 1464 mL / OUT: 1315 mL / NET: 149 mL    18 May 2019 07:01  -  19 May 2019 01:05  --------------------------------------------------------  IN: 1868.3 mL / OUT: 1300 mL / NET: 568.3 mL        MEDICATIONS  (STANDING):  albumin human  5% IVPB 2750 milliLiter(s) IV Intermittent once  albumin human  5% IVPB 2750 milliLiter(s) IV Intermittent once  calcium gluconate IVPB 1 Gram(s) IV Intermittent once  calcium gluconate IVPB 1 Gram(s) IV Intermittent once  chlorhexidine 0.12% Liquid 15 milliLiter(s) Oral Mucosa every 12 hours  chlorhexidine 2% Cloths 1 Application(s) Topical <User Schedule>  dextrose 5%. 1000 milliLiter(s) (50 mL/Hr) IV Continuous <Continuous>  dextrose 50% Injectable 25 Gram(s) IV Push once  docusate sodium Liquid 100 milliGRAM(s) Oral daily  enoxaparin Injectable 60 milliGRAM(s) SubCutaneous every 12 hours  glycopyrrolate Injectable 0.2 milliGRAM(s) IV Push once  Heparin injectable 1000 units/mL 2000 Unit(s),Heparin injectable 1000 units/mL 2000 Unit(s) 2000 Unit(s) IV Push once  Heparin injectable 1000 units/mL 2000 Unit(s),Heparin injectable 1000 units/mL 2000 Unit(s) 2000 Unit(s) IV Push once  Heparin injectable 1000 units/mL 2000 Unit(s),Heparin injectable 1000 units/mL 2000 Unit(s),Heparin injectable 1000 units/mL 2000 Unit(s) 2000 Unit(s) IV Push once  Heparin injectable 1000 units/mL 2000 Unit(s),Heparin injectable 1000 units/mL 2000 Unit(s),Heparin injectable 1000 units/mL 2000 Unit(s) 2000 Unit(s) IV Push once  insulin glargine Injectable (LANTUS) 15 Unit(s) SubCutaneous at bedtime  insulin regular  human corrective regimen sliding scale   SubCutaneous every 6 hours  insulin regular  human recombinant 4 Unit(s) SubCutaneous every 6 hours  midodrine 15 milliGRAM(s) Oral every 8 hours  norepinephrine Infusion 0.05 MICROgram(s)/kG/Min (5.466 mL/Hr) IV Continuous <Continuous>  nystatin Powder 1 Application(s) Topical three times a day  pantoprazole   Suspension 40 milliGRAM(s) Oral daily  petrolatum Ophthalmic Ointment 1 Application(s) Both EYES three times a day  senna 2 Tablet(s) Oral at bedtime  sodium chloride 0.9% Bolus 500 milliLiter(s) IV Bolus once  zinc oxide 20% Ointment 1 Application(s) Topical three times a day    MEDICATIONS  (PRN):  dextrose 40% Gel 15 Gram(s) Oral once PRN Blood Glucose LESS THAN 70 milliGRAM(s)/deciliter  glucagon  Injectable 1 milliGRAM(s) IntraMuscular once PRN Glucose LESS THAN 70 milligrams/deciliter    Physical Exam:  Gen: No Acute Distress, resting comfortable in bed, answering questions by nodding head; appears lethargic  Pulm: intubated, rhonchi bilaterally  GI: soft, non tender, nondistended, +BS, no rebound, no guarding, ostomy pink with brown stool output   Ext: SCDs in place, no edema/erythema/tenderness    LABS:                        8.1    15.94 )-----------( 310      ( 18 May 2019 16:30 )             25.8     05-18    144  |  103  |  19  ----------------------------<  200<H>  3.6   |  30  |  0.43<L>    Ca    9.0      18 May 2019 06:06  Mg     2.0             Urinalysis Basic - ( 18 May 2019 02:11 )    Color: Yellow / Appearance: Hazy / S.020 / pH: x  Gluc: x / Ketone: NEGATIVE  / Bili: Negative / Urobili: 1.0 E.U./dL   Blood: x / Protein: 100 mg/dL / Nitrite: NEGATIVE   Leuk Esterase: Large / RBC: < 5 /HPF / WBC Many /HPF   Sq Epi: x / Non Sq Epi: 0-5 /HPF / Bacteria: Present /HPF        RADIOLOGY & ADDITIONAL TESTS:

## 2019-05-18 NOTE — PROGRESS NOTE ADULT - ASSESSMENT
58 yo F PMHx polio, breast cancer, DM, Endometrial Cancer s/p exploratory laparotomy, enterolysis, SHAMIR, BSO, pelvic lymphadenectomy, low anterior resection mobilization of splenic flexure, end colostomy on 7/30/18, rectovaginal fistula, numerous admissions for urinary tract infection presented to [facility] in the setting of urosepsis. Hospital course complicated by blood stream infection (currently on ertapenem) and respiratory failure requiring intubation s/p extubation on 5/2. Intubated urgently on 7 lachman for CO2 narcosis in setting of AIDP and stepped up to MICU, initially improving s/p IVIG and extubated to bipap on 5/10, reintubated for CO2 narcosis on 5/14 and started on plasmapheresis     CARDIOVASCULAR  #Hemodynamics  Stable  - making appropriate amounts of urine, mentating well  - Still on levophed.  Uptitrated midodrine to 15mg TID. Will attempt to down titrate levophed to MAP above 65     PULMONARY  #Acute hypercapnic respiratory failure   2/2 to demyelinating polyneuropathy (AIDP), patient extubated to bipap on 5/10, reintubated on 5/14 for CO2 narcosis  - s/p 4 doses of IVIG finished 5/11 and started on plasmapharesis on 5/13. 3rd round today. Will receive every other day for a total of 5 doses   - appreciate further neurology recs  - transfusion medicine following. Appreciate further recs   - NIF's and VC daily       #Hx of urinary retention  - retaining on 5/8, placed joseph. Joseph changed on 5/17   - might need SP catheter  - appreciate urology recs    Neurology  #AIDP  Patient w AIDP leading to respiratory compromise, previous EMG w demyelinating polyneuropathy  - recs per neurology  - s/p 4 rounds of IVIG, with dramatic improvement in strength and respiratory status, however decompensated on 5/13 and reintubated  - f/u MRI brain w/wo contrast and MR cervical spine w/wo contrast when patient stable to go for imaging  - plasmapheresis as above under pulmonary    INFECTIOUS DISEASE  #hx of recurrent ESBL ecoli UTI and bacteremia  - previously on admission patient admitted to MICU in septic shock 2/2 to ESBL ecoli bacteremia and UTI  - Completed tx w/ ertapenem on 5/14     #Fever  - Pt spiked fever on 5/17. UA positive but pt recently tx for ESBL Ecoli UTI   - CXR negative. No new infiltrates  - Fever most likely from left cephalic v. thrombus   -  BCx 5/17 NGTD    HEME/ONC  #Stage IV endometrial adenocarcinoma  Per GYN-ONC w/ interval increase in tumor burden. Pt was treated with Anastrazole and initiated 3 weeks of megace 80mg BID followed by 3 weeks of tamoxifen.   - Pt was on tamoxifen and developed a left cephalic v. thrombus. Tamoxifen was dc'd on 5/17   - GYN-ONC following, appreciate further recs     #Cephalic vein thrombosis  - As above  - Continue w/ Lovenox 60mg BID (5/27-)    FLUIDS/ELECTROLYTES/NUTRITION  -IVF: none  -Monitor, Replete to K>4 and Mg>2  -Diet: continue NGT feeds for now    PROPHYLAXIS  -DVT: Lovenox and SCDs  -GI: none as on feeds    DISPO: MICU    CODE STATUS: DNR/DNI, palliative following 58 yo F PMHx polio, breast cancer, DM, Endometrial Cancer s/p exploratory laparotomy, enterolysis, SHAMIR, BSO, pelvic lymphadenectomy, low anterior resection mobilization of splenic flexure, end colostomy on 7/30/18, rectovaginal fistula, numerous admissions for urinary tract infection presented to [facility] in the setting of urosepsis. Hospital course complicated by blood stream infection (currently on ertapenem) and respiratory failure requiring intubation s/p extubation on 5/2. Intubated urgently on 7 lachman for CO2 narcosis in setting of AIDP and stepped up to MICU, initially improving s/p IVIG and extubated to bipap on 5/10, reintubated for CO2 narcosis on 5/14 and started on plasmapheresis     CARDIOVASCULAR  #Hemodynamics  Stable  - making appropriate amounts of urine, mentating well  - Still on levophed.  Uptitrated midodrine to 15mg TID. Will attempt to down titrate levophed to MAP above 65     PULMONARY  #Acute hypercapnic respiratory failure   2/2 to demyelinating polyneuropathy (AIDP), patient extubated to bipap on 5/10, reintubated on 5/14 for CO2 narcosis  - s/p 4 doses of IVIG finished 5/11 and started on plasmapharesis on 5/13. 3rd round today. Will receive every other day for a total of 5 doses   - appreciate further neurology recs  - transfusion medicine following. Appreciate further recs   - NIF's and VC daily       #Hx of urinary retention  - retaining on 5/8, placed joseph. Joseph changed on 5/17   - might need SP catheter  - appreciate urology recs    Neurology  #AIDP  Patient w AIDP leading to respiratory compromise, previous EMG w demyelinating polyneuropathy  - recs per neurology  - s/p 4 rounds of IVIG, with dramatic improvement in strength and respiratory status, however decompensated on 5/13 and reintubated  - f/u MRI brain w/wo contrast and MR cervical spine w/wo contrast when patient stable to go for imaging  - plasmapheresis as above under pulmonary    INFECTIOUS DISEASE  #hx of recurrent ESBL ecoli UTI and bacteremia  - previously on admission patient admitted to MICU in septic shock 2/2 to ESBL ecoli bacteremia and UTI  - Completed tx w/ ertapenem on 5/14     #Fever  - Pt spiked fever on 5/17. UA positive but pt recently tx for ESBL Ecoli UTI   - CXR negative. No new infiltrates  - Fever most likely from left cephalic v. thrombus   -  BCx 5/17 NGTD    HEME/ONC  # Anemia   - Hb this AM 6.2. Transfuse 1u pRBC's today   - If Hb does not respond to transfusion today, will need CT abdomen/ pelvis to r/o RP bleed  - Maintain active T&S    #Stage IV endometrial adenocarcinoma  Per GYN-ONC w/ interval increase in tumor burden. Pt was treated with Anastrazole and initiated 3 weeks of megace 80mg BID followed by 3 weeks of tamoxifen.   - Pt was on tamoxifen and developed a left cephalic v. thrombus. Tamoxifen was dc'd on 5/17   - GYN-ONC following, appreciate further recs     #Cephalic vein thrombosis  - As above  - Continue w/ Lovenox 60mg BID (5/27-)    FLUIDS/ELECTROLYTES/NUTRITION  -IVF: none  -Monitor, Replete to K>4 and Mg>2  -Diet: continue NGT feeds for now    PROPHYLAXIS  -DVT: Lovenox and SCDs  -GI: none as on feeds    DISPO: MICU    CODE STATUS: DNR/DNI, palliative following 58 yo F PMHx polio, breast cancer, DM, Endometrial Cancer s/p exploratory laparotomy, enterolysis, SHAMIR, BSO, pelvic lymphadenectomy, low anterior resection mobilization of splenic flexure, end colostomy on 7/30/18, rectovaginal fistula, numerous admissions for urinary tract infection presented to [facility] in the setting of urosepsis. Hospital course complicated by blood stream infection (currently on ertapenem) and respiratory failure requiring intubation s/p extubation on 5/2. Intubated urgently on 7 lachman for CO2 narcosis in setting of AIDP and stepped up to MICU, initially improving s/p IVIG and extubated to bipap on 5/10, reintubated for CO2 narcosis on 5/14 and started on plasmapheresis     CARDIOVASCULAR  #Hemodynamics  Stable  - making appropriate amounts of urine, mentating well  - Still on levophed.  Uptitrated midodrine to 15mg TID. Will attempt to down titrate levophed to MAP above 65     PULMONARY  #Acute hypercapnic respiratory failure   2/2 to demyelinating polyneuropathy (AIDP), patient extubated to bipap on 5/10, reintubated on 5/14 for CO2 narcosis  - s/p 4 doses of IVIG finished 5/11 and started on plasmapharesis on 5/13. Completed 3 rounds. 4th round tomorrow. Will receive every other day for a total of 5 doses   - appreciate further neurology recs  - transfusion medicine following. Appreciate further recs   - NIF's and VC daily       #Hx of urinary retention  - retaining on 5/8, placed joseph. Joseph changed on 5/17   - might need SP catheter  - appreciate urology recs    Neurology  #AIDP  Patient w AIDP leading to respiratory compromise, previous EMG w demyelinating polyneuropathy  - recs per neurology  - s/p 4 rounds of IVIG, with dramatic improvement in strength and respiratory status, however decompensated on 5/13 and reintubated  - f/u MRI brain w/wo contrast and MR cervical spine w/wo contrast when patient stable to go for imaging  - plasmapheresis as above under pulmonary    INFECTIOUS DISEASE  #hx of recurrent ESBL ecoli UTI and bacteremia  - previously on admission patient admitted to MICU in septic shock 2/2 to ESBL ecoli bacteremia and UTI  - Completed tx w/ ertapenem on 5/14     #Fever  - Pt spiked fever on 5/17. UA positive but pt recently tx for ESBL Ecoli UTI   - CXR negative. No new infiltrates  - Fever most likely from left cephalic v. thrombus   -  BCx 5/17 NGTD    HEME/ONC  # Anemia   - Hb this AM 6.2. Transfuse 1u pRBC's today   - If Hb does not respond to transfusion today, will need CT abdomen/ pelvis to r/o RP bleed  - Maintain active T&S    #Stage IV endometrial adenocarcinoma  Per GYN-ONC w/ interval increase in tumor burden. Pt was treated with Anastrazole and initiated 3 weeks of megace 80mg BID followed by 3 weeks of tamoxifen.   - Pt was on tamoxifen and developed a left cephalic v. thrombus. Tamoxifen was dc'd on 5/17   - GYN-ONC following, appreciate further recs     #Cephalic vein thrombosis  - As above  - Continue w/ Lovenox 60mg BID (5/27-)    FLUIDS/ELECTROLYTES/NUTRITION  -IVF: none  -Monitor, Replete to K>4 and Mg>2  -Diet: continue NGT feeds for now    PROPHYLAXIS  -DVT: Lovenox and SCDs  -GI: none as on feeds    DISPO: MICU    CODE STATUS: DNR/DNI, palliative following

## 2019-05-18 NOTE — PROGRESS NOTE ADULT - ASSESSMENT
a -acute respiratory failure/ inflammatory neuropathy / uterine cancer/ ue dvt/ DM    Suggest:  continue mc  check vc and nif  compete course of plasmaphereisis  feeds as ordered  transfuse h/h  if poor response,  ct of abd and hold lovenox  even fluid balance  no change in insulin regimen

## 2019-05-19 LAB
ANION GAP SERPL CALC-SCNC: 10 MMOL/L — SIGNIFICANT CHANGE UP (ref 5–17)
ANION GAP SERPL CALC-SCNC: 11 MMOL/L — SIGNIFICANT CHANGE UP (ref 5–17)
APTT BLD: 75.1 SEC — HIGH (ref 27.5–36.3)
BASOPHILS # BLD AUTO: 0 K/UL — SIGNIFICANT CHANGE UP (ref 0–0.2)
BASOPHILS # BLD AUTO: 0.01 K/UL — SIGNIFICANT CHANGE UP (ref 0–0.2)
BASOPHILS NFR BLD AUTO: 0 % — SIGNIFICANT CHANGE UP (ref 0–2)
BASOPHILS NFR BLD AUTO: 0.1 % — SIGNIFICANT CHANGE UP (ref 0–2)
BUN SERPL-MCNC: 17 MG/DL — SIGNIFICANT CHANGE UP (ref 7–23)
BUN SERPL-MCNC: 19 MG/DL — SIGNIFICANT CHANGE UP (ref 7–23)
CALCIUM SERPL-MCNC: 9.2 MG/DL — SIGNIFICANT CHANGE UP (ref 8.4–10.5)
CALCIUM SERPL-MCNC: 9.6 MG/DL — SIGNIFICANT CHANGE UP (ref 8.4–10.5)
CHLORIDE SERPL-SCNC: 101 MMOL/L — SIGNIFICANT CHANGE UP (ref 96–108)
CHLORIDE SERPL-SCNC: 102 MMOL/L — SIGNIFICANT CHANGE UP (ref 96–108)
CO2 SERPL-SCNC: 28 MMOL/L — SIGNIFICANT CHANGE UP (ref 22–31)
CO2 SERPL-SCNC: 31 MMOL/L — SIGNIFICANT CHANGE UP (ref 22–31)
CREAT SERPL-MCNC: 0.44 MG/DL — LOW (ref 0.5–1.3)
CREAT SERPL-MCNC: 0.5 MG/DL — SIGNIFICANT CHANGE UP (ref 0.5–1.3)
EOSINOPHIL # BLD AUTO: 0.21 K/UL — SIGNIFICANT CHANGE UP (ref 0–0.5)
EOSINOPHIL # BLD AUTO: 0.49 K/UL — SIGNIFICANT CHANGE UP (ref 0–0.5)
EOSINOPHIL NFR BLD AUTO: 1.7 % — SIGNIFICANT CHANGE UP (ref 0–6)
EOSINOPHIL NFR BLD AUTO: 2.6 % — SIGNIFICANT CHANGE UP (ref 0–6)
FIBRINOGEN PPP-MCNC: 199 MG/DL — LOW (ref 258–438)
FIBRINOGEN PPP-MCNC: 372 MG/DL — SIGNIFICANT CHANGE UP (ref 258–438)
GLUCOSE BLDC GLUCOMTR-MCNC: 126 MG/DL — HIGH (ref 70–99)
GLUCOSE BLDC GLUCOMTR-MCNC: 138 MG/DL — HIGH (ref 70–99)
GLUCOSE BLDC GLUCOMTR-MCNC: 146 MG/DL — HIGH (ref 70–99)
GLUCOSE BLDC GLUCOMTR-MCNC: 89 MG/DL — SIGNIFICANT CHANGE UP (ref 70–99)
GLUCOSE BLDC GLUCOMTR-MCNC: 93 MG/DL — SIGNIFICANT CHANGE UP (ref 70–99)
GLUCOSE SERPL-MCNC: 143 MG/DL — HIGH (ref 70–99)
GLUCOSE SERPL-MCNC: 78 MG/DL — SIGNIFICANT CHANGE UP (ref 70–99)
HCT VFR BLD CALC: 24.4 % — LOW (ref 34.5–45)
HCT VFR BLD CALC: 28.3 % — LOW (ref 34.5–45)
HGB BLD-MCNC: 7.6 G/DL — LOW (ref 11.5–15.5)
HGB BLD-MCNC: 8.9 G/DL — LOW (ref 11.5–15.5)
IMM GRANULOCYTES NFR BLD AUTO: 0.4 % — SIGNIFICANT CHANGE UP (ref 0–1.5)
INR BLD: 1.32 — HIGH (ref 0.88–1.16)
LYMPHOCYTES # BLD AUTO: 19.6 % — SIGNIFICANT CHANGE UP (ref 13–44)
LYMPHOCYTES # BLD AUTO: 19.8 % — SIGNIFICANT CHANGE UP (ref 13–44)
LYMPHOCYTES # BLD AUTO: 2.51 K/UL — SIGNIFICANT CHANGE UP (ref 1–3.3)
LYMPHOCYTES # BLD AUTO: 3.71 K/UL — HIGH (ref 1–3.3)
MAGNESIUM SERPL-MCNC: 2.1 MG/DL — SIGNIFICANT CHANGE UP (ref 1.6–2.6)
MCHC RBC-ENTMCNC: 28.6 PG — SIGNIFICANT CHANGE UP (ref 27–34)
MCHC RBC-ENTMCNC: 28.7 PG — SIGNIFICANT CHANGE UP (ref 27–34)
MCHC RBC-ENTMCNC: 31.1 GM/DL — LOW (ref 32–36)
MCHC RBC-ENTMCNC: 31.4 GM/DL — LOW (ref 32–36)
MCV RBC AUTO: 91.3 FL — SIGNIFICANT CHANGE UP (ref 80–100)
MCV RBC AUTO: 91.7 FL — SIGNIFICANT CHANGE UP (ref 80–100)
MONOCYTES # BLD AUTO: 1.21 K/UL — HIGH (ref 0–0.9)
MONOCYTES # BLD AUTO: 1.46 K/UL — HIGH (ref 0–0.9)
MONOCYTES NFR BLD AUTO: 7.7 % — SIGNIFICANT CHANGE UP (ref 2–14)
MONOCYTES NFR BLD AUTO: 9.5 % — SIGNIFICANT CHANGE UP (ref 2–14)
NEUTROPHILS # BLD AUTO: 13.11 K/UL — HIGH (ref 1.8–7.4)
NEUTROPHILS # BLD AUTO: 8.69 K/UL — HIGH (ref 1.8–7.4)
NEUTROPHILS NFR BLD AUTO: 66.7 % — SIGNIFICANT CHANGE UP (ref 43–77)
NEUTROPHILS NFR BLD AUTO: 68.5 % — SIGNIFICANT CHANGE UP (ref 43–77)
NRBC # BLD: 0 /100 WBCS — SIGNIFICANT CHANGE UP (ref 0–0)
NRBC # BLD: 0 /100 WBCS — SIGNIFICANT CHANGE UP (ref 0–0)
PHOSPHATE SERPL-MCNC: 4 MG/DL — SIGNIFICANT CHANGE UP (ref 2.5–4.5)
PLATELET # BLD AUTO: 253 K/UL — SIGNIFICANT CHANGE UP (ref 150–400)
PLATELET # BLD AUTO: 374 K/UL — SIGNIFICANT CHANGE UP (ref 150–400)
POTASSIUM SERPL-MCNC: 3.6 MMOL/L — SIGNIFICANT CHANGE UP (ref 3.5–5.3)
POTASSIUM SERPL-MCNC: 4.1 MMOL/L — SIGNIFICANT CHANGE UP (ref 3.5–5.3)
POTASSIUM SERPL-SCNC: 3.6 MMOL/L — SIGNIFICANT CHANGE UP (ref 3.5–5.3)
POTASSIUM SERPL-SCNC: 4.1 MMOL/L — SIGNIFICANT CHANGE UP (ref 3.5–5.3)
PROTHROM AB SERPL-ACNC: 15.1 SEC — HIGH (ref 10–12.9)
RBC # BLD: 2.66 M/UL — LOW (ref 3.8–5.2)
RBC # BLD: 3.1 M/UL — LOW (ref 3.8–5.2)
RBC # FLD: 17.3 % — HIGH (ref 10.3–14.5)
RBC # FLD: 17.4 % — HIGH (ref 10.3–14.5)
SODIUM SERPL-SCNC: 141 MMOL/L — SIGNIFICANT CHANGE UP (ref 135–145)
SODIUM SERPL-SCNC: 142 MMOL/L — SIGNIFICANT CHANGE UP (ref 135–145)
WBC # BLD: 12.68 K/UL — HIGH (ref 3.8–10.5)
WBC # BLD: 18.92 K/UL — HIGH (ref 3.8–10.5)
WBC # FLD AUTO: 12.68 K/UL — HIGH (ref 3.8–10.5)
WBC # FLD AUTO: 18.92 K/UL — HIGH (ref 3.8–10.5)

## 2019-05-19 PROCEDURE — 99291 CRITICAL CARE FIRST HOUR: CPT

## 2019-05-19 PROCEDURE — 71045 X-RAY EXAM CHEST 1 VIEW: CPT | Mod: 26

## 2019-05-19 RX ORDER — POTASSIUM CHLORIDE 20 MEQ
40 PACKET (EA) ORAL ONCE
Refills: 0 | Status: COMPLETED | OUTPATIENT
Start: 2019-05-19 | End: 2019-05-19

## 2019-05-19 RX ORDER — ACETAMINOPHEN 500 MG
650 TABLET ORAL ONCE
Refills: 0 | Status: COMPLETED | OUTPATIENT
Start: 2019-05-19 | End: 2019-05-19

## 2019-05-19 RX ADMIN — Medication 40 MILLIEQUIVALENT(S): at 14:13

## 2019-05-19 RX ADMIN — NYSTATIN CREAM 1 APPLICATION(S): 100000 CREAM TOPICAL at 14:12

## 2019-05-19 RX ADMIN — Medication 1 APPLICATION(S): at 22:03

## 2019-05-19 RX ADMIN — Medication 1 APPLICATION(S): at 14:13

## 2019-05-19 RX ADMIN — NYSTATIN CREAM 1 APPLICATION(S): 100000 CREAM TOPICAL at 22:03

## 2019-05-19 RX ADMIN — Medication 100 MILLIGRAM(S): at 12:21

## 2019-05-19 RX ADMIN — CHLORHEXIDINE GLUCONATE 15 MILLILITER(S): 213 SOLUTION TOPICAL at 22:04

## 2019-05-19 RX ADMIN — INSULIN GLARGINE 15 UNIT(S): 100 INJECTION, SOLUTION SUBCUTANEOUS at 22:05

## 2019-05-19 RX ADMIN — NYSTATIN CREAM 1 APPLICATION(S): 100000 CREAM TOPICAL at 07:36

## 2019-05-19 RX ADMIN — SENNA PLUS 2 TABLET(S): 8.6 TABLET ORAL at 22:02

## 2019-05-19 RX ADMIN — MIDODRINE HYDROCHLORIDE 15 MILLIGRAM(S): 2.5 TABLET ORAL at 07:34

## 2019-05-19 RX ADMIN — CHLORHEXIDINE GLUCONATE 15 MILLILITER(S): 213 SOLUTION TOPICAL at 12:21

## 2019-05-19 RX ADMIN — ZINC OXIDE 1 APPLICATION(S): 200 OINTMENT TOPICAL at 22:04

## 2019-05-19 RX ADMIN — Medication 650 MILLIGRAM(S): at 20:19

## 2019-05-19 RX ADMIN — ENOXAPARIN SODIUM 60 MILLIGRAM(S): 100 INJECTION SUBCUTANEOUS at 07:35

## 2019-05-19 RX ADMIN — INSULIN HUMAN 4 UNIT(S): 100 INJECTION, SOLUTION SUBCUTANEOUS at 17:34

## 2019-05-19 RX ADMIN — CHLORHEXIDINE GLUCONATE 1 APPLICATION(S): 213 SOLUTION TOPICAL at 06:47

## 2019-05-19 RX ADMIN — MIDODRINE HYDROCHLORIDE 15 MILLIGRAM(S): 2.5 TABLET ORAL at 22:02

## 2019-05-19 RX ADMIN — ZINC OXIDE 1 APPLICATION(S): 200 OINTMENT TOPICAL at 14:14

## 2019-05-19 RX ADMIN — Medication 1 APPLICATION(S): at 07:37

## 2019-05-19 RX ADMIN — INSULIN HUMAN 4 UNIT(S): 100 INJECTION, SOLUTION SUBCUTANEOUS at 12:22

## 2019-05-19 RX ADMIN — Medication 650 MILLIGRAM(S): at 23:00

## 2019-05-19 RX ADMIN — ENOXAPARIN SODIUM 60 MILLIGRAM(S): 100 INJECTION SUBCUTANEOUS at 17:33

## 2019-05-19 RX ADMIN — INSULIN HUMAN 4 UNIT(S): 100 INJECTION, SOLUTION SUBCUTANEOUS at 07:59

## 2019-05-19 RX ADMIN — PANTOPRAZOLE SODIUM 40 MILLIGRAM(S): 20 TABLET, DELAYED RELEASE ORAL at 12:21

## 2019-05-19 RX ADMIN — ZINC OXIDE 1 APPLICATION(S): 200 OINTMENT TOPICAL at 07:36

## 2019-05-19 RX ADMIN — INSULIN HUMAN 4 UNIT(S): 100 INJECTION, SOLUTION SUBCUTANEOUS at 00:51

## 2019-05-19 RX ADMIN — MIDODRINE HYDROCHLORIDE 15 MILLIGRAM(S): 2.5 TABLET ORAL at 14:13

## 2019-05-19 RX ADMIN — Medication 5.47 MICROGRAM(S)/KG/MIN: at 00:51

## 2019-05-19 RX ADMIN — Medication 200 GRAM(S): at 10:05

## 2019-05-19 NOTE — PROGRESS NOTE ADULT - SUBJECTIVE AND OBJECTIVE BOX
INTERVAL HPI/OVERNIGHT EVENTS:  - 14 NIF        SUBJECTIVE: Patient seen and examined at bedside.     CONSTITUTIONAL: No weakness, fevers or chills  EYES/ENT: No visual changes;  No vertigo or throat pain   NECK: No pain or stiffness  RESPIRATORY: No cough, wheezing, hemoptysis; No shortness of breath  CARDIOVASCULAR: No chest pain or palpitations  GASTROINTESTINAL: No abdominal or epigastric pain. No nausea, vomiting, or hematemesis; No diarrhea or constipation. No melena or hematochezia.  GENITOURINARY: No dysuria, frequency or hematuria  NEUROLOGICAL: No numbness or weakness  SKIN: No itching, rashes    OBJECTIVE:    VITAL SIGNS:  ICU Vital Signs Last 24 Hrs  T(C): 37.4 (19 May 2019 10:34), Max: 38.2 (19 May 2019 01:30)  T(F): 99.3 (19 May 2019 10:34), Max: 100.7 (19 May 2019 01:30)  HR: 62 (19 May 2019 10:00) (58 - 90)  BP: 113/57 (19 May 2019 10:00) (83/55 - 137/73)  BP(mean): 83 (19 May 2019 10:00) (60 - 101)  ABP: --  ABP(mean): --  RR: 26 (19 May 2019 10:00) (13 - 43)  SpO2: 100% (19 May 2019 10:00) (97% - 100%)    Mode: CPAP with PS, FiO2: 40, PEEP: 5, PS: 12, MAP: 7.5, PIP: 19     @ 07: @ 07:00  --------------------------------------------------------  IN: 2595.3 mL / OUT: 1576 mL / NET: 1019.3 mL     @ 07:  -   @ 13:37  --------------------------------------------------------  IN: 132 mL / OUT: 30 mL / NET: 102 mL      CAPILLARY BLOOD GLUCOSE      POCT Blood Glucose.: 126 mg/dL (19 May 2019 12:18)      PHYSICAL EXAM:    General: NAD  HEENT: NC/AT; PERRL, clear conjunctiva  Neck: supple  Respiratory: CTA b/l  Cardiovascular: +S1/S2; RRR  Abdomen: soft, NT/ND; +BS x4  Extremities: WWP, 2+ peripheral pulses b/l; no LE edema  Skin: normal color and turgor; no rash  Neurological:    MEDICATIONS:  MEDICATIONS  (STANDING):  albumin human  5% IVPB 2750 milliLiter(s) IV Intermittent once  albumin human  5% IVPB 2750 milliLiter(s) IV Intermittent once  calcium gluconate IVPB 1 Gram(s) IV Intermittent once  chlorhexidine 0.12% Liquid 15 milliLiter(s) Oral Mucosa every 12 hours  chlorhexidine 2% Cloths 1 Application(s) Topical <User Schedule>  dextrose 5%. 1000 milliLiter(s) (50 mL/Hr) IV Continuous <Continuous>  dextrose 50% Injectable 25 Gram(s) IV Push once  docusate sodium Liquid 100 milliGRAM(s) Oral daily  enoxaparin Injectable 60 milliGRAM(s) SubCutaneous every 12 hours  glycopyrrolate Injectable 0.2 milliGRAM(s) IV Push once  Heparin injectable 1000 units/mL 2000 Unit(s),Heparin injectable 1000 units/mL 2000 Unit(s) 2000 Unit(s) IV Push once  Heparin injectable 1000 units/mL 2000 Unit(s),Heparin injectable 1000 units/mL 2000 Unit(s) 2000 Unit(s) IV Push once  Heparin injectable 1000 units/mL 2000 Unit(s),Heparin injectable 1000 units/mL 2000 Unit(s),Heparin injectable 1000 units/mL 2000 Unit(s) 2000 Unit(s) IV Push once  Heparin injectable 1000 units/mL 2000 Unit(s),Heparin injectable 1000 units/mL 2000 Unit(s),Heparin injectable 1000 units/mL 2000 Unit(s) 2000 Unit(s) IV Push once  insulin glargine Injectable (LANTUS) 15 Unit(s) SubCutaneous at bedtime  insulin regular  human corrective regimen sliding scale   SubCutaneous every 6 hours  insulin regular  human recombinant 4 Unit(s) SubCutaneous every 6 hours  midodrine 15 milliGRAM(s) Oral every 8 hours  norepinephrine Infusion 0.05 MICROgram(s)/kG/Min (5.466 mL/Hr) IV Continuous <Continuous>  nystatin Powder 1 Application(s) Topical three times a day  pantoprazole   Suspension 40 milliGRAM(s) Oral daily  petrolatum Ophthalmic Ointment 1 Application(s) Both EYES three times a day  senna 2 Tablet(s) Oral at bedtime  sodium chloride 0.9% Bolus 500 milliLiter(s) IV Bolus once  zinc oxide 20% Ointment 1 Application(s) Topical three times a day    MEDICATIONS  (PRN):  dextrose 40% Gel 15 Gram(s) Oral once PRN Blood Glucose LESS THAN 70 milliGRAM(s)/deciliter  glucagon  Injectable 1 milliGRAM(s) IntraMuscular once PRN Glucose LESS THAN 70 milligrams/deciliter      ALLERGIES:  Allergies    No Known Allergies    Intolerances    IVIG PRODUCT IS PRIGIVEN OR GAMMUNEX (Unknown)      LABS:                        8.9    18.92 )-----------( 374      ( 19 May 2019 12:50 )             28.3     -    142  |  101  |  17  ----------------------------<  143<H>  3.6   |  31  |  0.44<L>    Ca    9.2      19 May 2019 12:50  Phos  4.0       Mg     2.1           PT/INR - ( 19 May 2019 12:50 )   PT: 15.1 sec;   INR: 1.32          PTT - ( 19 May 2019 12:50 )  PTT:75.1 sec  Urinalysis Basic - ( 18 May 2019 02:11 )    Color: Yellow / Appearance: Hazy / S.020 / pH: x  Gluc: x / Ketone: NEGATIVE  / Bili: Negative / Urobili: 1.0 E.U./dL   Blood: x / Protein: 100 mg/dL / Nitrite: NEGATIVE   Leuk Esterase: Large / RBC: < 5 /HPF / WBC Many /HPF   Sq Epi: x / Non Sq Epi: 0-5 /HPF / Bacteria: Present /HPF        RADIOLOGY & ADDITIONAL TESTS: Reviewed. INTERVAL HPI/OVERNIGHT EVENTS:  -  -14 NIF    SUBJECTIVE: Patient seen and examined at bedside. Wants tube out but understands why she cannot have it out yet; denies shortness of breath, nausea or vomiting    CONSTITUTIONAL: No weakness, fevers or chills  EYES/ENT: No visual changes;  No vertigo or throat pain   NECK: No pain or stiffness  RESPIRATORY: No cough, wheezing, hemoptysis; No shortness of breath  CARDIOVASCULAR: No chest pain or palpitations  GASTROINTESTINAL: No abdominal or epigastric pain. No nausea, vomiting, or hematemesis; No diarrhea or constipation. No melena or hematochezia.  GENITOURINARY: No dysuria, frequency or hematuria  NEUROLOGICAL: Weakness, but believes it is improved  SKIN: No itching, rashes    OBJECTIVE:    VITAL SIGNS:  ICU Vital Signs Last 24 Hrs  T(C): 37.4 (19 May 2019 10:34), Max: 38.2 (19 May 2019 01:30)  T(F): 99.3 (19 May 2019 10:34), Max: 100.7 (19 May 2019 01:30)  HR: 62 (19 May 2019 10:00) (58 - 90)  BP: 113/57 (19 May 2019 10:00) (83/55 - 137/73)  BP(mean): 83 (19 May 2019 10:00) (60 - 101)  ABP: --  ABP(mean): --  RR: 26 (19 May 2019 10:00) (13 - 43)  SpO2: 100% (19 May 2019 10:00) (97% - 100%)    Mode: CPAP with PS, FiO2: 40, PEEP: 5, PS: 12, MAP: 7.5, PIP: 19     @ 07:  -   @ 07:00  --------------------------------------------------------  IN: 2595.3 mL / OUT: 1576 mL / NET: 1019.3 mL     @ 07:01  -   @ 13:37  --------------------------------------------------------  IN: 132 mL / OUT: 30 mL / NET: 102 mL      CAPILLARY BLOOD GLUCOSE      POCT Blood Glucose.: 126 mg/dL (19 May 2019 12:18)      PHYSICAL EXAM:    General: NAD, sleeping comfortably   HEENT: NC/AT; PERRL, clear conjunctiva  Neck: supple  Respiratory: CTA b/l  Cardiovascular: +S1/S2; RRR  Abdomen: soft, NT/ND; +BS x4  Extremities: WWP, 2+ peripheral pulses b/l; no LE edema  Skin: normal color and turgor; no rash  Neurological: Arouses to voice then responds to questioning;     MEDICATIONS:  MEDICATIONS  (STANDING):  albumin human  5% IVPB 2750 milliLiter(s) IV Intermittent once  albumin human  5% IVPB 2750 milliLiter(s) IV Intermittent once  calcium gluconate IVPB 1 Gram(s) IV Intermittent once  chlorhexidine 0.12% Liquid 15 milliLiter(s) Oral Mucosa every 12 hours  chlorhexidine 2% Cloths 1 Application(s) Topical <User Schedule>  dextrose 5%. 1000 milliLiter(s) (50 mL/Hr) IV Continuous <Continuous>  dextrose 50% Injectable 25 Gram(s) IV Push once  docusate sodium Liquid 100 milliGRAM(s) Oral daily  enoxaparin Injectable 60 milliGRAM(s) SubCutaneous every 12 hours  glycopyrrolate Injectable 0.2 milliGRAM(s) IV Push once  Heparin injectable 1000 units/mL 2000 Unit(s),Heparin injectable 1000 units/mL 2000 Unit(s) 2000 Unit(s) IV Push once  Heparin injectable 1000 units/mL 2000 Unit(s),Heparin injectable 1000 units/mL 2000 Unit(s) 2000 Unit(s) IV Push once  Heparin injectable 1000 units/mL 2000 Unit(s),Heparin injectable 1000 units/mL 2000 Unit(s),Heparin injectable 1000 units/mL 2000 Unit(s) 2000 Unit(s) IV Push once  Heparin injectable 1000 units/mL 2000 Unit(s),Heparin injectable 1000 units/mL 2000 Unit(s),Heparin injectable 1000 units/mL 2000 Unit(s) 2000 Unit(s) IV Push once  insulin glargine Injectable (LANTUS) 15 Unit(s) SubCutaneous at bedtime  insulin regular  human corrective regimen sliding scale   SubCutaneous every 6 hours  insulin regular  human recombinant 4 Unit(s) SubCutaneous every 6 hours  midodrine 15 milliGRAM(s) Oral every 8 hours  norepinephrine Infusion 0.05 MICROgram(s)/kG/Min (5.466 mL/Hr) IV Continuous <Continuous>  nystatin Powder 1 Application(s) Topical three times a day  pantoprazole   Suspension 40 milliGRAM(s) Oral daily  petrolatum Ophthalmic Ointment 1 Application(s) Both EYES three times a day  senna 2 Tablet(s) Oral at bedtime  sodium chloride 0.9% Bolus 500 milliLiter(s) IV Bolus once  zinc oxide 20% Ointment 1 Application(s) Topical three times a day    MEDICATIONS  (PRN):  dextrose 40% Gel 15 Gram(s) Oral once PRN Blood Glucose LESS THAN 70 milliGRAM(s)/deciliter  glucagon  Injectable 1 milliGRAM(s) IntraMuscular once PRN Glucose LESS THAN 70 milligrams/deciliter      ALLERGIES:  Allergies    No Known Allergies    Intolerances    IVIG PRODUCT IS PRIGIVEN OR GAMMUNEX (Unknown)      LABS:                        8.9    18.92 )-----------( 374      ( 19 May 2019 12:50 )             28.3     05-    142  |  101  |  17  ----------------------------<  143<H>  3.6   |  31  |  0.44<L>    Ca    9.2      19 May 2019 12:50  Phos  4.0       Mg     2.1           PT/INR - ( 19 May 2019 12:50 )   PT: 15.1 sec;   INR: 1.32          PTT - ( 19 May 2019 12:50 )  PTT:75.1 sec  Urinalysis Basic - ( 18 May 2019 02:11 )    Color: Yellow / Appearance: Hazy / S.020 / pH: x  Gluc: x / Ketone: NEGATIVE  / Bili: Negative / Urobili: 1.0 E.U./dL   Blood: x / Protein: 100 mg/dL / Nitrite: NEGATIVE   Leuk Esterase: Large / RBC: < 5 /HPF / WBC Many /HPF   Sq Epi: x / Non Sq Epi: 0-5 /HPF / Bacteria: Present /HPF        RADIOLOGY & ADDITIONAL TESTS: Reviewed.

## 2019-05-19 NOTE — PROGRESS NOTE ADULT - SUBJECTIVE AND OBJECTIVE BOX
Patient is a 59y old  Female who presents with a chief complaint of sepsis (19 May 2019 07:59)        INTERVAL HPI/OVERNIGHT EVENTS: none    SYMPTOMS no pain or sob    DRIPS minimal levo    Mode: CPAP with PS  FiO2: 40  PEEP: 5  PS: 12  MAP: 7.2  PIP: 22      ICU Vital Signs Last 24 Hrs  T(C): 37.5 (19 May 2019 06:53), Max: 38.2 (19 May 2019 01:30)  T(F): 99.5 (19 May 2019 06:53), Max: 100.7 (19 May 2019 01:30)  HR: 60 (19 May 2019 09:00) (58 - 90)  BP: 137/73 (19 May 2019 08:00) (77/43 - 137/73)  BP(mean): 101 (19 May 2019 08:00) (59 - 101)  ABP: --  ABP(mean): --  RR: 17 (19 May 2019 09:00) (13 - 43)  SpO2: 100% (19 May 2019 09:00) (97% - 100%)      I&O's Summary    18 May 2019 07:01  -  19 May 2019 07:00  --------------------------------------------------------  IN: 2595.3 mL / OUT: 1576 mL / NET: 1019.3 mL    19 May 2019 07:01  -  19 May 2019 10:15  --------------------------------------------------------  IN: 132 mL / OUT: 30 mL / NET: 102 mL        EXAM    Chest    Heart    Abdomen    Extremities    Neuro      LABS:                            7.6    12.68 )-----------( 253      ( 19 May 2019 07:11 )             24.4     05-    141  |  102  |  19  ----------------------------<  78  4.1   |  28  |  0.50    Ca    9.6      19 May 2019 07:11  Phos  4.0     05-19  Mg     2.1     05-19        Urinalysis Basic - ( 18 May 2019 02:11 )    Color: Yellow / Appearance: Hazy / S.020 / pH: x  Gluc: x / Ketone: NEGATIVE  / Bili: Negative / Urobili: 1.0 E.U./dL   Blood: x / Protein: 100 mg/dL / Nitrite: NEGATIVE   Leuk Esterase: Large / RBC: < 5 /HPF / WBC Many /HPF   Sq Epi: x / Non Sq Epi: 0-5 /HPF / Bacteria: Present /HPF            RADIOLOGY & ADDITIONAL STUDIES: nif -14 cm    CRITICAL CARE TIME SPENT: 35

## 2019-05-19 NOTE — PROGRESS NOTE ADULT - SUBJECTIVE AND OBJECTIVE BOX
Pt seen and examined.    Pt still intubated, no new complaints. still with poor nifs.    PMHX:  SEPSIS, DUE TO UNSPECI  Yes  Handoff  MEWS Score  Diabetes  Gait disorder  Breast CA  Fistula  Pleural effusion  Muscle weakness  Malignant neoplasm  Sepsis, due to unspecified organism  Gram-negative bacteremia  Acute inflammatory demyelinating polyneuropathy  Advance care planning  Acute respiratory failure requiring reintubation  Transition of care performed with sharing of clinical summary  Septic shock due to Escherichia coli  Acute hypercapnic respiratory failure  Endometrial adenocarcinoma  Post-polio syndrome  Prophylactic measure  History of breast cancer  Urinary retention  Ovarian malignant neoplasm  Sepsis secondary to UTI  CIDP (chronic inflammatory demyelinating polyneuropathy)  Ventricular tachycardia, non-sustained  Hypomagnesemia  Ventricular tachycardia  Enterovaginal fistula  Type 2 diabetes mellitus without complication, without long-term current use of insulin  Pulmonary hypertension  Atelectasis  Pleural effusion  Pyelonephritis  Bacteremia  Sepsis, due to unspecified organism  Endometrial carcinoma  Urinary tract infection  Palliative care by specialist  Goals of care, counseling/discussion  Debility  Neurogenic bladder  Fistula  Malignant neoplasm  Acute hypoxemic respiratory failure  Septic shock  US guided central cath  Tracheal intubation  Insertion, feeding tube, Dobhoff  History of total abdominal hysterectomy and bilateral salpingo-oophorectomy  VOMITING  Chronic inflammatory demyelinating polyneuropathy  Post-polio syndrome  13  Breast CA  Malignant neoplasm      MEDS:  albumin human  5% IVPB 2750 milliLiter(s) IV Intermittent once  albumin human  5% IVPB 2750 milliLiter(s) IV Intermittent once  calcium gluconate IVPB 1 Gram(s) IV Intermittent once  chlorhexidine 0.12% Liquid 15 milliLiter(s) Oral Mucosa every 12 hours  chlorhexidine 2% Cloths 1 Application(s) Topical <User Schedule>  dextrose 40% Gel 15 Gram(s) Oral once PRN  dextrose 5%. 1000 milliLiter(s) IV Continuous <Continuous>  dextrose 50% Injectable 25 Gram(s) IV Push once  docusate sodium Liquid 100 milliGRAM(s) Oral daily  enoxaparin Injectable 60 milliGRAM(s) SubCutaneous every 12 hours  glucagon  Injectable 1 milliGRAM(s) IntraMuscular once PRN  glycopyrrolate Injectable 0.2 milliGRAM(s) IV Push once  Heparin injectable 1000 units/mL 2000 Unit(s),Heparin injectable 1000 units/mL 2000 Unit(s) 2000 Unit(s) IV Push once  Heparin injectable 1000 units/mL 2000 Unit(s),Heparin injectable 1000 units/mL 2000 Unit(s) 2000 Unit(s) IV Push once  Heparin injectable 1000 units/mL 2000 Unit(s),Heparin injectable 1000 units/mL 2000 Unit(s),Heparin injectable 1000 units/mL 2000 Unit(s) 2000 Unit(s) IV Push once  Heparin injectable 1000 units/mL 2000 Unit(s),Heparin injectable 1000 units/mL 2000 Unit(s),Heparin injectable 1000 units/mL 2000 Unit(s) 2000 Unit(s) IV Push once  insulin glargine Injectable (LANTUS) 15 Unit(s) SubCutaneous at bedtime  insulin regular  human corrective regimen sliding scale   SubCutaneous every 6 hours  insulin regular  human recombinant 4 Unit(s) SubCutaneous every 6 hours  midodrine 15 milliGRAM(s) Oral every 8 hours  norepinephrine Infusion 0.05 MICROgram(s)/kG/Min IV Continuous <Continuous>  nystatin Powder 1 Application(s) Topical three times a day  pantoprazole   Suspension 40 milliGRAM(s) Oral daily  petrolatum Ophthalmic Ointment 1 Application(s) Both EYES three times a day  senna 2 Tablet(s) Oral at bedtime  sodium chloride 0.9% Bolus 500 milliLiter(s) IV Bolus once  zinc oxide 20% Ointment 1 Application(s) Topical three times a day      Vitals:  T(C): 37.5 (05-19-19 @ 06:53), Max: 38.2 (05-19-19 @ 01:30)  HR: 60 (05-19-19 @ 09:00) (58 - 90)  BP: 137/73 (05-19-19 @ 08:00) (77/43 - 137/73)  RR: 17 (05-19-19 @ 09:00) (13 - 43)  SpO2: 100% (05-19-19 @ 09:00) (97% - 100%)    Exam:    Seems to continue to have had some mild improvement in neuro exam. Still 4/5 in BL UEs and 2/5 in BL hip flexors but with some partial antigravity hip flexion that was not noted before.    AP: cont PLEX. vent as per primary team. will follow

## 2019-05-19 NOTE — PROGRESS NOTE ADULT - ASSESSMENT
A -respiratory failure/ inflammatory neuropathy/ uterine ca    Suggest;  continue MV  two more secession plasmapheresis  even fluid balance  feed as ordered  taper levo to off  culture for temp spikes or if levo dose increaes

## 2019-05-19 NOTE — PROGRESS NOTE ADULT - ASSESSMENT
60yo F HD21  with stage IV endometrial adenocarcinoma s/p staging surgery '18 and 1 round of chemotherapy 2/19 readmitted from [facility] with fever, previously admitted for management of symptomatic rectovaginal and enterovaginal fistulas. Presents on referral from [facility] with urosepsis. Rapid response called due to worsening respiratory status s/p intubation and MICU admission. She then was on regional GYN service however had respiratory weakness and was transferred to Medicine/tele HD9 until requiring intubation again HD10 and is now in MICU. 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 She has been on plasmapheresis. She as re-intubated on 5/13 due to respiratory distress. She was made DNR/DNI 5/14. 5/17 found upper extremity DVT.   Management per MICU.    1. Neuro: Neurology following- weakness secondary Guillain [place] versus exacerbation of AIDP- now status post IVIG x4 day course, Patient has likely failed IVIG treatment given current status, now undergoing plasmapheresis, today had plasmapheresis which was stopped 10min early 2/2 to hypotension  2. Pulm: Currently intubated, was Extubated 5/10-transitioned to BiPAP initially, then on nasal canula 4L. re-intubated 5/13 due to respiratory distress,  now DNR/DNI. tolerating CPAP trails daily  3. Cardio: again on pressor support as needed; increased 5/18 due to hypotension 70s/40s  4. FEN/GI: Dobhoff in situ, on tube feeds. repletion electrolytes prn  5. : Adequate urine output, Joseph in place.  - Joseph placed 5/8 after finding of overflow incontinence, replaced joesph 5/18 and nabil UCx- prelim growing yeast, f/u final results,f/u urology recs regarding suprapubic catheter   6. ID: fever of 101.3 at 1130am on 5/17, blood cultures obtained, UCx taken 5/18 after placement of new joseph  -s/p ertapenem ended 5/14, s/p meropenem, s/p vanc, appreciate ID recs   7. Endocrine: FS, ISS, Lantus 15u qhs (started 5/14), Humulin 4u q6hr, Metformin 1000mg BID held at this time   8. VTE prophylaxis - SCDs, Lovenox 60mg BID given upper extremity DVT noted on 5/17   9. GYN malignancy  -Stopped anastrazole and initiated 3 weeks of megace 80mg BID followed by 3 weeks of tamoxifen.   - Started megace and metformin 4/10, was on Tamoxifen for 21 days (5/1/19-  5/17)- stopped given upper extremity DVT , once patient recovers from acute neurologic issue will reassess cancer treatment options   -  Avoid immunotherapy at this time given associated risk of autoimmune disease   10. Derm: monitor sacrum for s/s of pressure ulcer, now stage 2  11. PT/OT needs evaluation, OOB with nursing staff  12. Social work/palliative: DNR/DNI

## 2019-05-19 NOTE — PROGRESS NOTE ADULT - ASSESSMENT
60 yo F PMHx polio, breast cancer, DM, Endometrial Cancer s/p exploratory laparotomy, enterolysis, SHAMIR, BSO, pelvic lymphadenectomy, low anterior resection mobilization of splenic flexure, end colostomy on 7/30/18, rectovaginal fistula, numerous admissions for urinary tract infection presented to [facility] in the setting of urosepsis. Hospital course complicated by blood stream infection (currently on ertapenem) and respiratory failure requiring intubation s/p extubation on 5/2. Intubated urgently on 7 lachman for CO2 narcosis in setting of AIDP and stepped up to MICU, initially improving s/p IVIG and extubated to bipap on 5/10, reintubated for CO2 narcosis on 5/14 and started on plasmapheresis     CARDIOVASCULAR  #Hemodynamics  Stable  - making appropriate amounts of urine, mentating well  - Still on levophed.  Uptitrated midodrine to 15mg TID. Will attempt to down titrate levophed to MAP above 65; however still requires pressor support    PULMONARY  #Acute hypercapnic respiratory failure   2/2 to demyelinating polyneuropathy (AIDP), patient extubated to bipap on 5/10, reintubated on 5/14 for CO2 narcosis  - s/p 4 doses of IVIG finished 5/11 and started on plasmapharesis on 5/13. Completed 3 rounds. 4th round today. Will receive every other day for a total of 5 doses   - appreciate further neurology recs  - transfusion medicine following. Appreciate further recs   - NIF's and VC daily (recent -14 NIF)      #Hx of urinary retention  - retaining on 5/8, placed joseph. Joseph changed on 5/17   - might need SP catheter  - appreciate urology recs    Neurology  #AIDP  Patient w AIDP leading to respiratory compromise, previous EMG w demyelinating polyneuropathy  - recs per neurology  - s/p 4 rounds of IVIG, with dramatic improvement in strength and respiratory status, however decompensated on 5/13 and reintubated  - f/u MRI brain w/wo contrast and MR cervical spine w/wo contrast when patient stable to go for imaging  - plasmapheresis as above under pulmonary    INFECTIOUS DISEASE  #hx of recurrent ESBL ecoli UTI and bacteremia  - previously on admission patient admitted to MICU in septic shock 2/2 to ESBL ecoli bacteremia and UTI  - Completed tx w/ ertapenem on 5/14     #Fever  - Pt spiked fever on 5/17. UA positive but pt recently tx for ESBL Ecoli UTI   - CXR negative. No new infiltrates  - Fever most likely from left cephalic v. thrombus   -  BCx 5/17 NGTD    HEME/ONC  # Anemia   - Hb 8.9 today; s/p 1u pRBC's 5/18  - If Hb does not respond to transfusion, will need CT abdomen/ pelvis to r/o RP bleed  - Maintain active T&S    #Stage IV endometrial adenocarcinoma  Per GYN-ONC w/ interval increase in tumor burden. Pt was treated with Anastrazole and initiated 3 weeks of megace 80mg BID followed by 3 weeks of tamoxifen.   - Pt was on tamoxifen and developed a left cephalic v. thrombus. Tamoxifen was dc'd on 5/17   - GYN-ONC following, appreciate further recs     #Cephalic vein thrombosis  - As above  - Continue w/ Lovenox 60mg BID (5/17-)    FLUIDS/ELECTROLYTES/NUTRITION  -IVF: none  -Monitor, Replete to K>4 and Mg>2  -Diet: continue NGT feeds for now    PROPHYLAXIS  -DVT: Lovenox and SCDs  -GI: none as on feeds    DISPO: MICU    CODE STATUS: DNR/DNI, palliative following

## 2019-05-19 NOTE — PROGRESS NOTE ADULT - SUBJECTIVE AND OBJECTIVE BOX
GYN Progress Note    Patient seen this afternoon, intubated, answering questions with nods of head and pointing. Denies any pain. Denies CP, palpitations, SOB, fever, chills, nausea, vomiting.    Vital Signs Last 24 Hrs  T(C): 37.3 (19 May 2019 14:50), Max: 38.2 (19 May 2019 01:30)  T(F): 99.2 (19 May 2019 14:50), Max: 100.7 (19 May 2019 01:30)  HR: 68 (19 May 2019 15:25) (58 - 90)  BP: 130/61 (19 May 2019 15:25) (66/43 - 140/59)  BP(mean): 82 (19 May 2019 15:) (48 - 133)  RR: 28 (19 May 2019 15:) (13 - 35)  SpO2: 100% (19 May 2019 15:25) (97% - 100%)    Physical Exam:  Gen: No Acute Distress, resting comfortable in bed,   Pulm: bilateral rhonchi, normal work of breathing, intubated   GI: soft, non tender, nondistended, +BS, no rebound, no guarding, ostomy pink and with minimal amount of brown stool   Ext: SCDs in place, no edema/erythema/tenderness     I&O's Summary    18 May 2019 07:  -  19 May 2019 07:00  --------------------------------------------------------  IN: 2595.3 mL / OUT: 1576 mL / NET: 1019.3 mL    19 May 2019 07:  -  19 May 2019 15:45  --------------------------------------------------------  IN: 452 mL / OUT: 220 mL / NET: 232 mL      MEDICATIONS  (STANDING):  albumin human  5% IVPB 2750 milliLiter(s) IV Intermittent once  albumin human  5% IVPB 2750 milliLiter(s) IV Intermittent once  calcium gluconate IVPB 1 Gram(s) IV Intermittent once  chlorhexidine 0.12% Liquid 15 milliLiter(s) Oral Mucosa every 12 hours  chlorhexidine 2% Cloths 1 Application(s) Topical <User Schedule>  dextrose 5%. 1000 milliLiter(s) (50 mL/Hr) IV Continuous <Continuous>  dextrose 50% Injectable 25 Gram(s) IV Push once  docusate sodium Liquid 100 milliGRAM(s) Oral daily  enoxaparin Injectable 60 milliGRAM(s) SubCutaneous every 12 hours  glycopyrrolate Injectable 0.2 milliGRAM(s) IV Push once  Heparin injectable 1000 units/mL 2000 Unit(s),Heparin injectable 1000 units/mL 2000 Unit(s) 2000 Unit(s) IV Push once  Heparin injectable 1000 units/mL 2000 Unit(s),Heparin injectable 1000 units/mL 2000 Unit(s) 2000 Unit(s) IV Push once  Heparin injectable 1000 units/mL 2000 Unit(s),Heparin injectable 1000 units/mL 2000 Unit(s),Heparin injectable 1000 units/mL 2000 Unit(s) 2000 Unit(s) IV Push once  Heparin injectable 1000 units/mL 2000 Unit(s),Heparin injectable 1000 units/mL 2000 Unit(s),Heparin injectable 1000 units/mL 2000 Unit(s) 2000 Unit(s) IV Push once  insulin glargine Injectable (LANTUS) 15 Unit(s) SubCutaneous at bedtime  insulin regular  human corrective regimen sliding scale   SubCutaneous every 6 hours  insulin regular  human recombinant 4 Unit(s) SubCutaneous every 6 hours  midodrine 15 milliGRAM(s) Oral every 8 hours  norepinephrine Infusion 0.05 MICROgram(s)/kG/Min (5.466 mL/Hr) IV Continuous <Continuous>  nystatin Powder 1 Application(s) Topical three times a day  pantoprazole   Suspension 40 milliGRAM(s) Oral daily  petrolatum Ophthalmic Ointment 1 Application(s) Both EYES three times a day  senna 2 Tablet(s) Oral at bedtime  sodium chloride 0.9% Bolus 500 milliLiter(s) IV Bolus once  zinc oxide 20% Ointment 1 Application(s) Topical three times a day    MEDICATIONS  (PRN):  dextrose 40% Gel 15 Gram(s) Oral once PRN Blood Glucose LESS THAN 70 milliGRAM(s)/deciliter  glucagon  Injectable 1 milliGRAM(s) IntraMuscular once PRN Glucose LESS THAN 70 milligrams/deciliter      LABS:                        8.9    18.92 )-----------( 374      ( 19 May 2019 12:50 )             28.3         142  |  101  |  17  ----------------------------<  143<H>  3.6   |  31  |  0.44<L>    Ca    9.2      19 May 2019 12:50  Phos  4.0       Mg     2.1           PT/INR - ( 19 May 2019 12:50 )   PT: 15.1 sec;   INR: 1.32          PTT - ( 19 May 2019 12:50 )  PTT:75.1 sec  Urinalysis Basic - ( 18 May 2019 02:11 )    Color: Yellow / Appearance: Hazy / S.020 / pH: x  Gluc: x / Ketone: NEGATIVE  / Bili: Negative / Urobili: 1.0 E.U./dL   Blood: x / Protein: 100 mg/dL / Nitrite: NEGATIVE   Leuk Esterase: Large / RBC: < 5 /HPF / WBC Many /HPF   Sq Epi: x / Non Sq Epi: 0-5 /HPF / Bacteria: Present /HPF

## 2019-05-19 NOTE — PROGRESS NOTE ADULT - SUBJECTIVE AND OBJECTIVE BOX
Pt seen and examined at bedside. She denies being in any pain.  [place] in place. She was tearful when evaluated at bedside.    T(F): 99.5 (19 @ 06:53), Max: 101 (19 @ 09:28)  HR: 68 (19 @ 06:00) (65 - 90)  BP: 105/58 (19 @ 06:00) (77/43 - 119/75)  RR: 25 (19 @ 06:00) (13 - 43)  SpO2: 100% (19 @ 06:00) (97% - 100%)  Wt(kg): --  I&O's Summary    18 May 2019 07:01  -  19 May 2019 07:00  --------------------------------------------------------  IN: 2009.7 mL / OUT: 1555 mL / NET: 454.7 mL        MEDICATIONS  (STANDING):  albumin human  5% IVPB 2750 milliLiter(s) IV Intermittent once  albumin human  5% IVPB 2750 milliLiter(s) IV Intermittent once  calcium gluconate IVPB 1 Gram(s) IV Intermittent once  calcium gluconate IVPB 1 Gram(s) IV Intermittent once  chlorhexidine 0.12% Liquid 15 milliLiter(s) Oral Mucosa every 12 hours  chlorhexidine 2% Cloths 1 Application(s) Topical <User Schedule>  dextrose 5%. 1000 milliLiter(s) (50 mL/Hr) IV Continuous <Continuous>  dextrose 50% Injectable 25 Gram(s) IV Push once  docusate sodium Liquid 100 milliGRAM(s) Oral daily  enoxaparin Injectable 60 milliGRAM(s) SubCutaneous every 12 hours  glycopyrrolate Injectable 0.2 milliGRAM(s) IV Push once  Heparin injectable 1000 units/mL 2000 Unit(s),Heparin injectable 1000 units/mL 2000 Unit(s) 2000 Unit(s) IV Push once  Heparin injectable 1000 units/mL 2000 Unit(s),Heparin injectable 1000 units/mL 2000 Unit(s) 2000 Unit(s) IV Push once  Heparin injectable 1000 units/mL 2000 Unit(s),Heparin injectable 1000 units/mL 2000 Unit(s),Heparin injectable 1000 units/mL 2000 Unit(s) 2000 Unit(s) IV Push once  Heparin injectable 1000 units/mL 2000 Unit(s),Heparin injectable 1000 units/mL 2000 Unit(s),Heparin injectable 1000 units/mL 2000 Unit(s) 2000 Unit(s) IV Push once  insulin glargine Injectable (LANTUS) 15 Unit(s) SubCutaneous at bedtime  insulin regular  human corrective regimen sliding scale   SubCutaneous every 6 hours  insulin regular  human recombinant 4 Unit(s) SubCutaneous every 6 hours  midodrine 15 milliGRAM(s) Oral every 8 hours  norepinephrine Infusion 0.05 MICROgram(s)/kG/Min (5.466 mL/Hr) IV Continuous <Continuous>  nystatin Powder 1 Application(s) Topical three times a day  pantoprazole   Suspension 40 milliGRAM(s) Oral daily  petrolatum Ophthalmic Ointment 1 Application(s) Both EYES three times a day  senna 2 Tablet(s) Oral at bedtime  sodium chloride 0.9% Bolus 500 milliLiter(s) IV Bolus once  zinc oxide 20% Ointment 1 Application(s) Topical three times a day    MEDICATIONS  (PRN):  dextrose 40% Gel 15 Gram(s) Oral once PRN Blood Glucose LESS THAN 70 milliGRAM(s)/deciliter  glucagon  Injectable 1 milliGRAM(s) IntraMuscular once PRN Glucose LESS THAN 70 milligrams/deciliter      Physical Exam:  Gen: No Acute Distress, resting comfortable in bed, answering questions by nodding head  Pulm: intubated, rhonchi bilaterally  GI: soft, non tender, nondistended, +BS, no rebound, no guarding, ostomy pink with brown stool output   Ext: SCDs in place, no edema/erythema/tenderness    LABS:                        7.6    12.68 )-----------( 253      ( 19 May 2019 07:11 )             24.4     05-19    141  |  102  |  19  ----------------------------<  78  4.1   |  28  |  0.50    Ca    9.6      19 May 2019 07:11  Phos  4.0     05-  Mg     2.1     -19        Urinalysis Basic - ( 18 May 2019 02:11 )    Color: Yellow / Appearance: Hazy / S.020 / pH: x  Gluc: x / Ketone: NEGATIVE  / Bili: Negative / Urobili: 1.0 E.U./dL   Blood: x / Protein: 100 mg/dL / Nitrite: NEGATIVE   Leuk Esterase: Large / RBC: < 5 /HPF / WBC Many /HPF   Sq Epi: x / Non Sq Epi: 0-5 /HPF / Bacteria: Present /HPF        RADIOLOGY & ADDITIONAL TESTS:

## 2019-05-19 NOTE — PROGRESS NOTE ADULT - ASSESSMENT
58yo F HD21  with stage IV endometrial adenocarcinoma s/p staging surgery '18 and 1 round of chemotherapy 2/19 readmitted from [facility] with fever, previously admitted for management of symptomatic rectovaginal and enterovaginal fistulas. Presents on referral from [facility] with urosepsis. Rapid response called due to worsening respiratory status s/p intubation and MICU admission. She then was on regional GYN service however had respiratory weakness and was transferred to Medicine/tele HD9 until requiring intubation again HD10 and is now in MICU. 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 She has been on plasmapheresis. She as re-intubated on 5/13 due to respiratory distress. She was made DNR/DNI 5/14. 5/17 found upper extremity DVT.   Management per MICU.    1. Neuro: weakness secondary Guillain [place] versus exacerbation of AIDP- now status post IVIG x4 day course. Neurology following-appreciate recs. Patient has likely failed IVIG treatment given current status. Now undergoing plasmapheresis.  2. Pulm: Currently intubated, was Extubated 5/10-transitioned to BiPAP initially, then on nasal canula 4L. re-intubated 5/13 due to respiratory distress,  now DNR/DNI. tolerating CPAP trails daily  3. Cardio: again on pressor support as needed; increased this afternoon due to hypotension 70s/40s  4. FEN/GI: Dobhoff in situ, on tube feeds. repletion electrolytes prn  5. : Adequate urine output, Joseph in place.  - Joseph placed 5/8 after finding of overflow incontinence, replaced joseph 5/17 for repeat UCX, f/u urology recs regarding suprapubic catheter   6. ID: fever of 101.3 at 1130am on 5/17, blood cultures obtained, UCx taken 5/17 evening after placement of new joseph  -s/p ertapenem ended 5/14, s/p meropenem, s/p vanc, appreciate ID recs.   7. Endocrine: FS, ISS, Lantus 8u qhs started 5/14, Metformin 1000mg BID held at this time   8. VTE prophylaxis - SCDs, Lovenox 60mg BID given upper extremity DVT noted on 5/17   9. GYN malignancy  -Stopped anastrazole and initiated 3 weeks of megace 80mg BID followed by 3 weeks of tamoxifen.   - Started megace and metformin 4/10, was on Tamoxifen for 21 days (5/1/19-  5/17)- stopped given upper extremity DVT , once patient recovers from acute neurologic issue will reassess cancer treatment options   -  Avoid immunotherapy at this time given associated risk of autoimmune disease   10. Derm: monitor sacrum for s/s of pressure ulcer, now stage 2  11. PT/OT needs evaluation, OOB with nursing staff  12. Social work/palliative: DNR/DNI

## 2019-05-20 ENCOUNTER — OUTPATIENT (OUTPATIENT)
Dept: OUTPATIENT SERVICES | Facility: HOSPITAL | Age: 59
LOS: 1 days | End: 2019-05-20
Payer: MEDICARE

## 2019-05-20 ENCOUNTER — RESULT REVIEW (OUTPATIENT)
Age: 59
End: 2019-05-20

## 2019-05-20 DIAGNOSIS — N82.4 OTHER FEMALE INTESTINAL-GENITAL TRACT FISTULAE: ICD-10-CM

## 2019-05-20 DIAGNOSIS — Z90.710 ACQUIRED ABSENCE OF BOTH CERVIX AND UTERUS: Chronic | ICD-10-CM

## 2019-05-20 DIAGNOSIS — C54.1 MALIGNANT NEOPLASM OF ENDOMETRIUM: ICD-10-CM

## 2019-05-20 LAB
ALBUMIN SERPL ELPH-MCNC: 4.1 G/DL — SIGNIFICANT CHANGE UP (ref 3.3–5)
ALP SERPL-CCNC: 34 U/L — LOW (ref 40–120)
ALT FLD-CCNC: <5 U/L — LOW (ref 10–45)
ANION GAP SERPL CALC-SCNC: 9 MMOL/L — SIGNIFICANT CHANGE UP (ref 5–17)
AST SERPL-CCNC: 11 U/L — SIGNIFICANT CHANGE UP (ref 10–40)
BASOPHILS # BLD AUTO: 0.01 K/UL — SIGNIFICANT CHANGE UP (ref 0–0.2)
BASOPHILS NFR BLD AUTO: 0.1 % — SIGNIFICANT CHANGE UP (ref 0–2)
BILIRUB SERPL-MCNC: 0.5 MG/DL — SIGNIFICANT CHANGE UP (ref 0.2–1.2)
BLD GP AB SCN SERPL QL: NEGATIVE — SIGNIFICANT CHANGE UP
BUN SERPL-MCNC: 17 MG/DL — SIGNIFICANT CHANGE UP (ref 7–23)
CALCIUM SERPL-MCNC: 9.3 MG/DL — SIGNIFICANT CHANGE UP (ref 8.4–10.5)
CHLORIDE SERPL-SCNC: 101 MMOL/L — SIGNIFICANT CHANGE UP (ref 96–108)
CO2 SERPL-SCNC: 30 MMOL/L — SIGNIFICANT CHANGE UP (ref 22–31)
CREAT SERPL-MCNC: 0.5 MG/DL — SIGNIFICANT CHANGE UP (ref 0.5–1.3)
CULTURE RESULTS: SIGNIFICANT CHANGE UP
EOSINOPHIL # BLD AUTO: 0.23 K/UL — SIGNIFICANT CHANGE UP (ref 0–0.5)
EOSINOPHIL NFR BLD AUTO: 2.2 % — SIGNIFICANT CHANGE UP (ref 0–6)
GLUCOSE BLDC GLUCOMTR-MCNC: 132 MG/DL — HIGH (ref 70–99)
GLUCOSE BLDC GLUCOMTR-MCNC: 139 MG/DL — HIGH (ref 70–99)
GLUCOSE BLDC GLUCOMTR-MCNC: 144 MG/DL — HIGH (ref 70–99)
GLUCOSE BLDC GLUCOMTR-MCNC: 179 MG/DL — HIGH (ref 70–99)
GLUCOSE SERPL-MCNC: 161 MG/DL — HIGH (ref 70–99)
HCT VFR BLD CALC: 24.6 % — LOW (ref 34.5–45)
HGB BLD-MCNC: 7.5 G/DL — LOW (ref 11.5–15.5)
IMM GRANULOCYTES NFR BLD AUTO: 0.5 % — SIGNIFICANT CHANGE UP (ref 0–1.5)
LYMPHOCYTES # BLD AUTO: 1.74 K/UL — SIGNIFICANT CHANGE UP (ref 1–3.3)
LYMPHOCYTES # BLD AUTO: 16.9 % — SIGNIFICANT CHANGE UP (ref 13–44)
MAGNESIUM SERPL-MCNC: 2 MG/DL — SIGNIFICANT CHANGE UP (ref 1.6–2.6)
MCHC RBC-ENTMCNC: 28.4 PG — SIGNIFICANT CHANGE UP (ref 27–34)
MCHC RBC-ENTMCNC: 30.5 GM/DL — LOW (ref 32–36)
MCV RBC AUTO: 93.2 FL — SIGNIFICANT CHANGE UP (ref 80–100)
MONOCYTES # BLD AUTO: 1.04 K/UL — HIGH (ref 0–0.9)
MONOCYTES NFR BLD AUTO: 10.1 % — SIGNIFICANT CHANGE UP (ref 2–14)
NEUTROPHILS # BLD AUTO: 7.25 K/UL — SIGNIFICANT CHANGE UP (ref 1.8–7.4)
NEUTROPHILS NFR BLD AUTO: 70.2 % — SIGNIFICANT CHANGE UP (ref 43–77)
NRBC # BLD: 0 /100 WBCS — SIGNIFICANT CHANGE UP (ref 0–0)
PHOSPHATE SERPL-MCNC: 4.2 MG/DL — SIGNIFICANT CHANGE UP (ref 2.5–4.5)
PLATELET # BLD AUTO: 292 K/UL — SIGNIFICANT CHANGE UP (ref 150–400)
POTASSIUM SERPL-MCNC: 4 MMOL/L — SIGNIFICANT CHANGE UP (ref 3.5–5.3)
POTASSIUM SERPL-SCNC: 4 MMOL/L — SIGNIFICANT CHANGE UP (ref 3.5–5.3)
PROT SERPL-MCNC: 6 G/DL — SIGNIFICANT CHANGE UP (ref 6–8.3)
RBC # BLD: 2.64 M/UL — LOW (ref 3.8–5.2)
RBC # FLD: 17.2 % — HIGH (ref 10.3–14.5)
RH IG SCN BLD-IMP: POSITIVE — SIGNIFICANT CHANGE UP
SODIUM SERPL-SCNC: 140 MMOL/L — SIGNIFICANT CHANGE UP (ref 135–145)
SPECIMEN SOURCE: SIGNIFICANT CHANGE UP
WBC # BLD: 10.32 K/UL — SIGNIFICANT CHANGE UP (ref 3.8–10.5)
WBC # FLD AUTO: 10.32 K/UL — SIGNIFICANT CHANGE UP (ref 3.8–10.5)

## 2019-05-20 PROCEDURE — 72156 MRI NECK SPINE W/O & W/DYE: CPT | Mod: 26

## 2019-05-20 PROCEDURE — 88321 CONSLTJ&REPRT SLD PREP ELSWR: CPT

## 2019-05-20 PROCEDURE — 99291 CRITICAL CARE FIRST HOUR: CPT

## 2019-05-20 PROCEDURE — 99233 SBSQ HOSP IP/OBS HIGH 50: CPT

## 2019-05-20 PROCEDURE — 88341 IMHCHEM/IMCYTCHM EA ADD ANTB: CPT

## 2019-05-20 PROCEDURE — 70553 MRI BRAIN STEM W/O & W/DYE: CPT | Mod: 26

## 2019-05-20 PROCEDURE — 93970 EXTREMITY STUDY: CPT | Mod: 26

## 2019-05-20 PROCEDURE — 99231 SBSQ HOSP IP/OBS SF/LOW 25: CPT

## 2019-05-20 PROCEDURE — 71045 X-RAY EXAM CHEST 1 VIEW: CPT | Mod: 26

## 2019-05-20 RX ORDER — ACETAMINOPHEN 500 MG
650 TABLET ORAL ONCE
Refills: 0 | Status: COMPLETED | OUTPATIENT
Start: 2019-05-20 | End: 2019-05-19

## 2019-05-20 RX ORDER — ENOXAPARIN SODIUM 100 MG/ML
60 INJECTION SUBCUTANEOUS ONCE
Refills: 0 | Status: COMPLETED | OUTPATIENT
Start: 2019-05-20 | End: 2019-05-20

## 2019-05-20 RX ORDER — ASCORBIC ACID 60 MG
500 TABLET,CHEWABLE ORAL DAILY
Refills: 0 | Status: DISCONTINUED | OUTPATIENT
Start: 2019-05-20 | End: 2019-05-20

## 2019-05-20 RX ORDER — ASCORBIC ACID 60 MG
500 TABLET,CHEWABLE ORAL DAILY
Refills: 0 | Status: DISCONTINUED | OUTPATIENT
Start: 2019-05-20 | End: 2019-06-06

## 2019-05-20 RX ORDER — CALCIUM GLUCONATE 100 MG/ML
1 VIAL (ML) INTRAVENOUS ONCE
Refills: 0 | Status: DISCONTINUED | OUTPATIENT
Start: 2019-05-21 | End: 2019-05-24

## 2019-05-20 RX ORDER — ROBINUL 0.2 MG/ML
0.2 INJECTION INTRAMUSCULAR; INTRAVENOUS ONCE
Refills: 0 | Status: COMPLETED | OUTPATIENT
Start: 2019-05-20 | End: 2019-05-20

## 2019-05-20 RX ORDER — ALBUMIN HUMAN 25 %
2750 VIAL (ML) INTRAVENOUS ONCE
Refills: 0 | Status: DISCONTINUED | OUTPATIENT
Start: 2019-05-21 | End: 2019-05-23

## 2019-05-20 RX ADMIN — Medication 1 APPLICATION(S): at 14:04

## 2019-05-20 RX ADMIN — INSULIN GLARGINE 15 UNIT(S): 100 INJECTION, SOLUTION SUBCUTANEOUS at 21:51

## 2019-05-20 RX ADMIN — CHLORHEXIDINE GLUCONATE 15 MILLILITER(S): 213 SOLUTION TOPICAL at 21:53

## 2019-05-20 RX ADMIN — INSULIN HUMAN 4 UNIT(S): 100 INJECTION, SOLUTION SUBCUTANEOUS at 00:00

## 2019-05-20 RX ADMIN — NYSTATIN CREAM 1 APPLICATION(S): 100000 CREAM TOPICAL at 21:53

## 2019-05-20 RX ADMIN — INSULIN HUMAN 4 UNIT(S): 100 INJECTION, SOLUTION SUBCUTANEOUS at 11:46

## 2019-05-20 RX ADMIN — ENOXAPARIN SODIUM 60 MILLIGRAM(S): 100 INJECTION SUBCUTANEOUS at 06:23

## 2019-05-20 RX ADMIN — INSULIN HUMAN 4 UNIT(S): 100 INJECTION, SOLUTION SUBCUTANEOUS at 06:23

## 2019-05-20 RX ADMIN — Medication 1 APPLICATION(S): at 21:53

## 2019-05-20 RX ADMIN — ZINC OXIDE 1 APPLICATION(S): 200 OINTMENT TOPICAL at 06:00

## 2019-05-20 RX ADMIN — CHLORHEXIDINE GLUCONATE 15 MILLILITER(S): 213 SOLUTION TOPICAL at 09:52

## 2019-05-20 RX ADMIN — PANTOPRAZOLE SODIUM 40 MILLIGRAM(S): 20 TABLET, DELAYED RELEASE ORAL at 11:46

## 2019-05-20 RX ADMIN — NYSTATIN CREAM 1 APPLICATION(S): 100000 CREAM TOPICAL at 14:03

## 2019-05-20 RX ADMIN — MIDODRINE HYDROCHLORIDE 15 MILLIGRAM(S): 2.5 TABLET ORAL at 14:03

## 2019-05-20 RX ADMIN — Medication 500 MILLIGRAM(S): at 11:46

## 2019-05-20 RX ADMIN — NYSTATIN CREAM 1 APPLICATION(S): 100000 CREAM TOPICAL at 06:25

## 2019-05-20 RX ADMIN — MIDODRINE HYDROCHLORIDE 15 MILLIGRAM(S): 2.5 TABLET ORAL at 21:52

## 2019-05-20 RX ADMIN — Medication 100 MILLIGRAM(S): at 11:46

## 2019-05-20 RX ADMIN — ROBINUL 0.2 MILLIGRAM(S): 0.2 INJECTION INTRAMUSCULAR; INTRAVENOUS at 09:52

## 2019-05-20 RX ADMIN — CHLORHEXIDINE GLUCONATE 1 APPLICATION(S): 213 SOLUTION TOPICAL at 06:24

## 2019-05-20 RX ADMIN — MIDODRINE HYDROCHLORIDE 15 MILLIGRAM(S): 2.5 TABLET ORAL at 06:25

## 2019-05-20 RX ADMIN — INSULIN HUMAN 4 UNIT(S): 100 INJECTION, SOLUTION SUBCUTANEOUS at 23:31

## 2019-05-20 RX ADMIN — Medication 1 APPLICATION(S): at 06:26

## 2019-05-20 RX ADMIN — SENNA PLUS 2 TABLET(S): 8.6 TABLET ORAL at 21:52

## 2019-05-20 RX ADMIN — ZINC OXIDE 1 APPLICATION(S): 200 OINTMENT TOPICAL at 23:20

## 2019-05-20 RX ADMIN — ENOXAPARIN SODIUM 60 MILLIGRAM(S): 100 INJECTION SUBCUTANEOUS at 19:53

## 2019-05-20 RX ADMIN — ZINC OXIDE 1 APPLICATION(S): 200 OINTMENT TOPICAL at 14:08

## 2019-05-20 NOTE — PROGRESS NOTE ADULT - PROBLEM SELECTOR PLAN 3
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; had been in subacute rehab facility for LE weakness and with gait/strength training regimen via PT; now further complicated by her initial septic shock and now third event of respiratory failure on this admission  - continue PT  - out of bed to chair when appropriate  - nutritional support  - patient distressed by her increasing dependence, unlikely she will be well enough to return home but wants to continue to try

## 2019-05-20 NOTE — PROGRESS NOTE ADULT - SUBJECTIVE AND OBJECTIVE BOX
GYN Progress Note    Assessed at the bedside.     Denies CP, palpitations, SOB, fever, chills, nausea, vomiting.  Last temp 100.6 , LE dopplers ordered by team, not on abx.   Currently tolerating CPAP.  The patient is depressed this morning and "wants to go home."  Off pressors, now hypertensive.  significant improvement in upper extremity strength, can write without difficulty; slight improvement in LE strength.  yeast in urine cx.    Vital Signs Last 24 Hrs  T(C): 36.9 (20 May 2019 04:29), Max: 38.6 (19 May 2019 19:29)  T(F): 98.5 (20 May 2019 04:29), Max: 101.4 (19 May 2019 19:29)  HR: 60 (20 May 2019 08:00) (60 - 86)  BP: 123/70 (20 May 2019 08:00) (66/43 - 167/77)  BP(mean): 96 (20 May 2019 08:00) (48 - 133)  RR: 42 (20 May 2019 08:00) (12 - 56)  SpO2: 100% (20 May 2019 08:00) (94% - 100%)    Physical Exam:  Gen: No Acute Distress  Pulm: no respiratory distress CPAP  GI: soft, appropriately nontender, nondistended, output in ostomy  Ext: SCDs in place, [facility]    I&O's Summary    19 May 2019 07:01  -  20 May 2019 07:00  --------------------------------------------------------  IN: 1055 mL / OUT: 835 mL / NET: 220 mL      MEDICATIONS  (STANDING):  albumin human  5% IVPB 2750 milliLiter(s) IV Intermittent once  albumin human  5% IVPB 2750 milliLiter(s) IV Intermittent once  calcium gluconate IVPB 1 Gram(s) IV Intermittent once  chlorhexidine 0.12% Liquid 15 milliLiter(s) Oral Mucosa every 12 hours  chlorhexidine 2% Cloths 1 Application(s) Topical <User Schedule>  dextrose 5%. 1000 milliLiter(s) (50 mL/Hr) IV Continuous <Continuous>  dextrose 50% Injectable 25 Gram(s) IV Push once  docusate sodium Liquid 100 milliGRAM(s) Oral daily  enoxaparin Injectable 60 milliGRAM(s) SubCutaneous every 12 hours  glycopyrrolate Injectable 0.2 milliGRAM(s) IV Push once  glycopyrrolate Injectable 0.2 milliGRAM(s) IV Push once  Heparin injectable 1000 units/mL 2000 Unit(s),Heparin injectable 1000 units/mL 2000 Unit(s) 2000 Unit(s) IV Push once  Heparin injectable 1000 units/mL 2000 Unit(s),Heparin injectable 1000 units/mL 2000 Unit(s) 2000 Unit(s) IV Push once  Heparin injectable 1000 units/mL 2000 Unit(s),Heparin injectable 1000 units/mL 2000 Unit(s),Heparin injectable 1000 units/mL 2000 Unit(s) 2000 Unit(s) IV Push once  Heparin injectable 1000 units/mL 2000 Unit(s),Heparin injectable 1000 units/mL 2000 Unit(s),Heparin injectable 1000 units/mL 2000 Unit(s) 2000 Unit(s) IV Push once  insulin glargine Injectable (LANTUS) 15 Unit(s) SubCutaneous at bedtime  insulin regular  human corrective regimen sliding scale   SubCutaneous every 6 hours  insulin regular  human recombinant 4 Unit(s) SubCutaneous every 6 hours  midodrine 15 milliGRAM(s) Oral every 8 hours  norepinephrine Infusion 0.05 MICROgram(s)/kG/Min (5.466 mL/Hr) IV Continuous <Continuous>  nystatin Powder 1 Application(s) Topical three times a day  pantoprazole   Suspension 40 milliGRAM(s) Oral daily  petrolatum Ophthalmic Ointment 1 Application(s) Both EYES three times a day  senna 2 Tablet(s) Oral at bedtime  sodium chloride 0.9% Bolus 500 milliLiter(s) IV Bolus once  zinc oxide 20% Ointment 1 Application(s) Topical three times a day    MEDICATIONS  (PRN):  dextrose 40% Gel 15 Gram(s) Oral once PRN Blood Glucose LESS THAN 70 milliGRAM(s)/deciliter  glucagon  Injectable 1 milliGRAM(s) IntraMuscular once PRN Glucose LESS THAN 70 milligrams/deciliter      LABS:                        7.5    10.32 )-----------( 292      ( 20 May 2019 06:02 )             24.6     05-20    140  |  101  |  17  ----------------------------<  161<H>  4.0   |  30  |  0.50    Ca    9.3      20 May 2019 06:02  Phos  4.2     05-20  Mg     2.0     05-20    TPro  6.0  /  Alb  4.1  /  TBili  0.5  /  DBili  x   /  AST  11  /  ALT  <5<L>  /  AlkPhos  34<L>  05-20    PT/INR - ( 19 May 2019 12:50 )   PT: 15.1 sec;   INR: 1.32          PTT - ( 19 May 2019 12:50 )  PTT:75.1 sec

## 2019-05-20 NOTE — PROGRESS NOTE ADULT - PROBLEM SELECTOR PLAN 5
Originally presented w/ septic shock of  source w/ bacteremia requiring pressor support and intubation -- shock now resolved. Cleared blood cultures on ABx.  - has completed antibiotic course  - management per primary team

## 2019-05-20 NOTE — PROGRESS NOTE ADULT - ASSESSMENT
58yo F HD22  with stage IV endometrial adenocarcinoma s/p staging surgery '18 and 1 round of chemotherapy 2/19 readmitted from [facility] with fever, previously admitted for management of symptomatic rectovaginal and enterovaginal fistulas. Presents on referral from [facility] with urosepsis. Rapid response called due to worsening respiratory status s/p intubation and MICU admission. She then was on regional GYN service however had respiratory weakness and was transferred to Medicine/tele HD9 until requiring intubation again HD10 and is now in MICU. 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 She has been on plasmapheresis. She as re-intubated on 5/13 due to respiratory distress. She was made DNR/DNI 5/14. 5/17 found upper extremity DVT.   Management per MICU.    1. 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 Pulm: Currently intubated, was Extubated 5/10-transitioned to BiPAP initially, then on nasal canula 4L. re-intubated 5/13 due to respiratory distress,  now DNR/DNI. tolerating CPAP trails daily.  3. Cardio: off pressors, hypertensive  4. FEN/GI: Dobhoff in situ, on tube feeds. repletion electrolytes prn  5. : Adequate urine output, Joseph in place. f/u final urine cx.   - Joseph placed 5/8 after finding of overflow incontinence, replaced joseph 5/18 and nabil UCx- prelim growing yeast, f/u final results,f/u urology recs regarding suprapubic catheter   6. ID: fever of 101.3 at 1130am on 5/17, blood cultures obtained, UCx taken 5/18 after placement of new joseph  -s/p ertapenem ended 5/14, s/p meropenem, s/p vanc, appreciate ID recs   7. Endocrine: FS, ISS, Lantus 15u qhs (started 5/14), Humulin 4u q6hr, Metformin 1000mg BID held at this time   8. VTE prophylaxis - SCDs, Lovenox 60mg BID given upper extremity DVT noted on 5/17   9. GYN malignancy  -Stopped anastrazole and initiated 3 weeks of megace 80mg BID followed by 3 weeks of tamoxifen.   - Started megace and metformin 4/10, was on Tamoxifen for 21 days (5/1/19-  5/17)- stopped given upper extremity DVT , once patient recovers from acute neurologic issue will reassess cancer treatment options   -  Avoid immunotherapy at this time given associated risk of autoimmune disease   10. Derm: monitor sacrum for s/s of pressure ulcer, now stage 2  11. PT/OT needs evaluation, OOB with nursing staff  12. Social work/palliative: DNR/DNI  . consider psych consult for depression.

## 2019-05-20 NOTE — PROGRESS NOTE ADULT - SUBJECTIVE AND OBJECTIVE BOX
pt seen at the bedside. she is with OT, PT and RT. she is tolerating CPAP all day. currently on pressors. per palliative note, expressing interest in trach/PEG. afebrile today. awaiting results of LE dopplers.     Vital Signs Last 24 Hrs  T(C): 37.4 (20 May 2019 14:00), Max: 38.6 (19 May 2019 19:29)  T(F): 99.3 (20 May 2019 14:00), Max: 101.4 (19 May 2019 19:29)  HR: 72 (20 May 2019 17:00) (60 - 86)  BP: 114/64 (20 May 2019 17:00) (72/43 - 167/77)  BP(mean): 89 (20 May 2019 17:00) (55 - 114)  RR: 28 (20 May 2019 17:00) (12 - 56)  SpO2: 100% (20 May 2019 17:00) (94% - 100%)

## 2019-05-20 NOTE — PROGRESS NOTE ADULT - ASSESSMENT
60yo F PMHx polio, breast cancer, DM, Endometrial Cancer s/p exploratory laparotomy, enterolysis, SHAMIR, BSO, pelvic lymphadenectomy, low anterior resection mobilization of splenic flexure, end colostomy on 7/30/18, rectovaginal fistula, numerous admissions for urinary tract infection presented to [facility] in the setting of urosepsis. Hospital course complicated by blood stream infection (currently on ertapenem) and respiratory failure requiring intubation s/p extubation on 5/2. Neurology consulted for worsening lower extremity weakness. On prior admission EMG raises suspicion for CIDP-spectrum disorder. Per collateral information patient became noticeably weak mala in her hands for approx 1 week prior to being diagnosed with IDP in April, suggesting a more acute process. 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 s/p four days of IVIG 500mg/kg on 5/7 - 5/10. Now receiving plasma exchange therapy. Now with improvement of LE strength.  Recommend  -partial plasma exhange yesterday, will undergo last treatment tomorrow  -fibrinogen low but no need to treat unless <100  -Pending MR brain w/ and w/o con & MR cervical spine w/ and w/o con once stable   Neurology will follow 58yo F PMHx polio, breast cancer, DM, Endometrial Cancer s/p exploratory laparotomy, enterolysis, SHAMIR, BSO, pelvic lymphadenectomy, low anterior resection mobilization of splenic flexure, end colostomy on 7/30/18, rectovaginal fistula, numerous admissions for urinary tract infection presented to [facility] in the setting of urosepsis. Hospital course complicated by blood stream infection (currently on ertapenem) and respiratory failure requiring intubation s/p extubation on 5/2. Neurology consulted for worsening lower extremity weakness. On prior admission EMG raises suspicion for CIDP-spectrum disorder. Per collateral information patient became noticeably weak mala in her hands for approx 1 week prior to being diagnosed with IDP in April, suggesting a more acute process. 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 s/p four days of IVIG 500mg/kg on 5/7 - 5/10. Now receiving plasma exchange therapy. Now with improvement of LE strength.    Recommend  -partial plasma exhange yesterday, will undergo last treatment tomorrow  -fibrinogen low but no need to treat unless <100  -Pending MR brain w/ and w/o con & MR cervical spine w/ and w/o con once stable   Neurology will follow

## 2019-05-20 NOTE — PROGRESS NOTE ADULT - SUBJECTIVE AND OBJECTIVE BOX
NEUROLOGY CONSULT PROGRESS NOTE    INTERVAL HISTORY:    Patient underwent Plex therapy yesterday and became hypotensive. The therapy was stopped and she was started on levophed. Has minimal improvement of LE strength b/l.     REVIEW OF SYSTEMS:  As per HPI, otherwise negative for Constitutional, Eyes, Ears/Nose/Mouth/Throat, Neck, Cardiovascular, Respiratory, Gastrointestinal, Genitourinary, Skin, Endocrine, Musculoskeletal, Psychiatric, and Hematologic/Lymphatic.    MEDICATIONS:  ascorbic acid 500 milliGRAM(s) Oral daily  chlorhexidine 0.12% Liquid 15 milliLiter(s) Oral Mucosa every 12 hours  chlorhexidine 2% Cloths 1 Application(s) Topical <User Schedule>  dextrose 40% Gel 15 Gram(s) Oral once PRN  dextrose 5%. 1000 milliLiter(s) IV Continuous <Continuous>  dextrose 50% Injectable 25 Gram(s) IV Push once  docusate sodium Liquid 100 milliGRAM(s) Oral daily  enoxaparin Injectable 60 milliGRAM(s) SubCutaneous every 12 hours  glucagon  Injectable 1 milliGRAM(s) IntraMuscular once PRN  glycopyrrolate Injectable 0.2 milliGRAM(s) IV Push once  insulin glargine Injectable (LANTUS) 15 Unit(s) SubCutaneous at bedtime  insulin regular  human corrective regimen sliding scale   SubCutaneous every 6 hours  insulin regular  human recombinant 4 Unit(s) SubCutaneous every 6 hours  midodrine 15 milliGRAM(s) Oral every 8 hours  norepinephrine Infusion 0.05 MICROgram(s)/kG/Min IV Continuous <Continuous>  nystatin Powder 1 Application(s) Topical three times a day  pantoprazole   Suspension 40 milliGRAM(s) Oral daily  petrolatum Ophthalmic Ointment 1 Application(s) Both EYES three times a day  senna 2 Tablet(s) Oral at bedtime  sodium chloride 0.9% Bolus 500 milliLiter(s) IV Bolus once  zinc oxide 20% Ointment 1 Application(s) Topical three times a day    VITAL SIGNS:  Vital Signs Last 24 Hrs  T(C): 37.1 (20 May 2019 10:00), Max: 38.6 (19 May 2019 19:29)  T(F): 98.8 (20 May 2019 10:00), Max: 101.4 (19 May 2019 19:29)  HR: 68 (20 May 2019 10:00) (60 - 86)  BP: 115/57 (20 May 2019 10:00) (66/43 - 167/77)  BP(mean): 79 (20 May 2019 10:00) (48 - 133)  RR: 28 (20 May 2019 10:00) (12 - 56)  SpO2: 100% (20 May 2019 10:00) (94% - 100%)    PHYSICAL EXAMINATION:  Constitutional: Frail middle age female, intubated, NG tube in place  Eyes: Conjunctiva and sclera clear  Neurologic:  - Mental Status:  Awake and alert, able to follow commands as directed  - Cranial Nerves II-XII:    II: Pupils are equal, round, and reactive to light.  III, IV, VI:  EOMI, horizontal nystagmus with lateral gaze  V:  Facial sensation is intact  VII:  Strong eye closure   VIII:  Hearing is intact to finger rub.  XI: Mildly improved shoulder shrug, able to move head  - Motor: Hypotrophic muscles. Upper extremities 4/5; RLE 2+/5; LLE 2/5, Ankle 0/5  - Reflexes:  1/5 reflexes at biceps, triceps, brachioradialis; 0/5 knees, and ankles. No babinski response.   - Sensory:  Intact to light touch  - Gait: Deferred      LABS:                          7.5    10.32 )-----------( 292      ( 20 May 2019 06:02 )             24.6     05-20    140  |  101  |  17  ----------------------------<  161<H>  4.0   |  30  |  0.50    Ca    9.3      20 May 2019 06:02  Phos  4.2     05-20  Mg     2.0     05-20    TPro  6.0  /  Alb  4.1  /  TBili  0.5  /  DBili  x   /  AST  11  /  ALT  <5<L>  /  AlkPhos  34<L>  05-20    PT/INR - ( 19 May 2019 12:50 )   PT: 15.1 sec;   INR: 1.32          PTT - ( 19 May 2019 12:50 )  PTT:75.1 sec      RADIOLOGY & ADDITIONAL STUDIES:      IMPRESSION & RECOMMENDATIONS: NEUROLOGY CONSULT PROGRESS NOTE    INTERVAL HISTORY:    Patient underwent Plex therapy yesterday and became hypotensive. The therapy was stopped and she was started on levophed. Has minimal improvement of LE strength b/l. NIF is still low but possibly not accurate due to intubation.    REVIEW OF SYSTEMS:  As per HPI, otherwise negative for Constitutional, Eyes, Ears/Nose/Mouth/Throat, Neck, Cardiovascular, Respiratory, Gastrointestinal, Genitourinary, Skin, Endocrine, Musculoskeletal, Psychiatric, and Hematologic/Lymphatic.    MEDICATIONS:  ascorbic acid 500 milliGRAM(s) Oral daily  chlorhexidine 0.12% Liquid 15 milliLiter(s) Oral Mucosa every 12 hours  chlorhexidine 2% Cloths 1 Application(s) Topical <User Schedule>  dextrose 40% Gel 15 Gram(s) Oral once PRN  dextrose 5%. 1000 milliLiter(s) IV Continuous <Continuous>  dextrose 50% Injectable 25 Gram(s) IV Push once  docusate sodium Liquid 100 milliGRAM(s) Oral daily  enoxaparin Injectable 60 milliGRAM(s) SubCutaneous every 12 hours  glucagon  Injectable 1 milliGRAM(s) IntraMuscular once PRN  glycopyrrolate Injectable 0.2 milliGRAM(s) IV Push once  insulin glargine Injectable (LANTUS) 15 Unit(s) SubCutaneous at bedtime  insulin regular  human corrective regimen sliding scale   SubCutaneous every 6 hours  insulin regular  human recombinant 4 Unit(s) SubCutaneous every 6 hours  midodrine 15 milliGRAM(s) Oral every 8 hours  norepinephrine Infusion 0.05 MICROgram(s)/kG/Min IV Continuous <Continuous>  nystatin Powder 1 Application(s) Topical three times a day  pantoprazole   Suspension 40 milliGRAM(s) Oral daily  petrolatum Ophthalmic Ointment 1 Application(s) Both EYES three times a day  senna 2 Tablet(s) Oral at bedtime  sodium chloride 0.9% Bolus 500 milliLiter(s) IV Bolus once  zinc oxide 20% Ointment 1 Application(s) Topical three times a day    VITAL SIGNS:  Vital Signs Last 24 Hrs  T(C): 37.1 (20 May 2019 10:00), Max: 38.6 (19 May 2019 19:29)  T(F): 98.8 (20 May 2019 10:00), Max: 101.4 (19 May 2019 19:29)  HR: 68 (20 May 2019 10:00) (60 - 86)  BP: 115/57 (20 May 2019 10:00) (66/43 - 167/77)  BP(mean): 79 (20 May 2019 10:00) (48 - 133)  RR: 28 (20 May 2019 10:00) (12 - 56)  SpO2: 100% (20 May 2019 10:00) (94% - 100%)    PHYSICAL EXAMINATION:  Constitutional: Frail middle age female, intubated, NG tube in place  Eyes: Conjunctiva and sclera clear  Neurologic:  - Mental Status:  Awake and alert, able to follow commands as directed  - Cranial Nerves II-XII:    II: Pupils are equal, round, and reactive to light.  III, IV, VI:  EOMI, horizontal nystagmus with lateral gaze  V:  Facial sensation is intact  VII:  Strong eye closure   VIII:  Hearing is intact to finger rub.  XI: Mildly improved shoulder shrug, able to move head  - Motor: Hypotrophic muscles. Upper extremities 4/5; RLE 2+/5; LLE 2/5, Ankle 0/5  - Reflexes:  1+ triceps, otherwise absent. No babinski response.   - Sensory:  Intact to light touch  - Gait: Deferred      LABS:                          7.5    10.32 )-----------( 292      ( 20 May 2019 06:02 )             24.6     05-20    140  |  101  |  17  ----------------------------<  161<H>  4.0   |  30  |  0.50    Ca    9.3      20 May 2019 06:02  Phos  4.2     05-20  Mg     2.0     05-20    TPro  6.0  /  Alb  4.1  /  TBili  0.5  /  DBili  x   /  AST  11  /  ALT  <5<L>  /  AlkPhos  34<L>  05-20    PT/INR - ( 19 May 2019 12:50 )   PT: 15.1 sec;   INR: 1.32          PTT - ( 19 May 2019 12:50 )  PTT:75.1 sec      RADIOLOGY & ADDITIONAL STUDIES:      IMPRESSION & RECOMMENDATIONS:

## 2019-05-20 NOTE — PROGRESS NOTE ADULT - SUBJECTIVE AND OBJECTIVE BOX
INTERVAL HPI/OVERNIGHT EVENTS:    SUBJECTIVE: Patient seen and examined at bedside.     CONSTITUTIONAL: No weakness, fevers or chills  EYES/ENT: No visual changes;  No vertigo or throat pain   NECK: No pain or stiffness  RESPIRATORY: No cough, wheezing, hemoptysis; No shortness of breath  CARDIOVASCULAR: No chest pain or palpitations  GASTROINTESTINAL: No abdominal or epigastric pain. No nausea, vomiting, or hematemesis; No diarrhea or constipation. No melena or hematochezia.  GENITOURINARY: No dysuria, frequency or hematuria  NEUROLOGICAL: No numbness or weakness  SKIN: No itching, rashes    OBJECTIVE:    VITAL SIGNS:  ICU Vital Signs Last 24 Hrs  T(C): 36.9 (20 May 2019 04:29), Max: 38.6 (19 May 2019 19:29)  T(F): 98.5 (20 May 2019 04:29), Max: 101.4 (19 May 2019 19:29)  HR: 65 (20 May 2019 05:55) (58 - 86)  BP: 91/45 (20 May 2019 04:00) (66/43 - 143/60)  BP(mean): 60 (20 May 2019 04:00) (48 - 133)  ABP: --  ABP(mean): --  RR: 13 (20 May 2019 05:55) (12 - 56)  SpO2: 100% (20 May 2019 05:55) (94% - 100%)    Mode: CPAP with PS, FiO2: 40, PEEP: 5, PS: 5, MAP: 7, PIP: 19    05-18 @ 07:01 - 05-19 @ 07:00  --------------------------------------------------------  IN: 2595.3 mL / OUT: 1576 mL / NET: 1019.3 mL    05-19 @ 07:01  -  05-20 @ 06:38  --------------------------------------------------------  IN: 1051 mL / OUT: 730 mL / NET: 321 mL      CAPILLARY BLOOD GLUCOSE      POCT Blood Glucose.: 139 mg/dL (20 May 2019 05:32)      PHYSICAL EXAM:    General: NAD  HEENT: NC/AT; PERRL, clear conjunctiva  Neck: supple  Respiratory: CTA b/l  Cardiovascular: +S1/S2; RRR  Abdomen: soft, NT/ND; +BS x4  Extremities: WWP, 2+ peripheral pulses b/l; no LE edema  Skin: normal color and turgor; no rash  Neurological:    MEDICATIONS:  MEDICATIONS  (STANDING):  albumin human  5% IVPB 2750 milliLiter(s) IV Intermittent once  albumin human  5% IVPB 2750 milliLiter(s) IV Intermittent once  calcium gluconate IVPB 1 Gram(s) IV Intermittent once  chlorhexidine 0.12% Liquid 15 milliLiter(s) Oral Mucosa every 12 hours  chlorhexidine 2% Cloths 1 Application(s) Topical <User Schedule>  dextrose 5%. 1000 milliLiter(s) (50 mL/Hr) IV Continuous <Continuous>  dextrose 50% Injectable 25 Gram(s) IV Push once  docusate sodium Liquid 100 milliGRAM(s) Oral daily  enoxaparin Injectable 60 milliGRAM(s) SubCutaneous every 12 hours  glycopyrrolate Injectable 0.2 milliGRAM(s) IV Push once  Heparin injectable 1000 units/mL 2000 Unit(s),Heparin injectable 1000 units/mL 2000 Unit(s) 2000 Unit(s) IV Push once  Heparin injectable 1000 units/mL 2000 Unit(s),Heparin injectable 1000 units/mL 2000 Unit(s) 2000 Unit(s) IV Push once  Heparin injectable 1000 units/mL 2000 Unit(s),Heparin injectable 1000 units/mL 2000 Unit(s),Heparin injectable 1000 units/mL 2000 Unit(s) 2000 Unit(s) IV Push once  Heparin injectable 1000 units/mL 2000 Unit(s),Heparin injectable 1000 units/mL 2000 Unit(s),Heparin injectable 1000 units/mL 2000 Unit(s) 2000 Unit(s) IV Push once  insulin glargine Injectable (LANTUS) 15 Unit(s) SubCutaneous at bedtime  insulin regular  human corrective regimen sliding scale   SubCutaneous every 6 hours  insulin regular  human recombinant 4 Unit(s) SubCutaneous every 6 hours  midodrine 15 milliGRAM(s) Oral every 8 hours  norepinephrine Infusion 0.05 MICROgram(s)/kG/Min (5.466 mL/Hr) IV Continuous <Continuous>  nystatin Powder 1 Application(s) Topical three times a day  pantoprazole   Suspension 40 milliGRAM(s) Oral daily  petrolatum Ophthalmic Ointment 1 Application(s) Both EYES three times a day  senna 2 Tablet(s) Oral at bedtime  sodium chloride 0.9% Bolus 500 milliLiter(s) IV Bolus once  zinc oxide 20% Ointment 1 Application(s) Topical three times a day    MEDICATIONS  (PRN):  dextrose 40% Gel 15 Gram(s) Oral once PRN Blood Glucose LESS THAN 70 milliGRAM(s)/deciliter  glucagon  Injectable 1 milliGRAM(s) IntraMuscular once PRN Glucose LESS THAN 70 milligrams/deciliter      ALLERGIES:  Allergies    No Known Allergies    Intolerances    IVIG PRODUCT IS PRIGIVEN OR GAMMUNEX (Unknown)      LABS:                        7.5    10.32 )-----------( 292      ( 20 May 2019 06:02 )             24.6     05-19    142  |  101  |  17  ----------------------------<  143<H>  3.6   |  31  |  0.44<L>    Ca    9.2      19 May 2019 12:50  Phos  4.0     05-19  Mg     2.1     05-19      PT/INR - ( 19 May 2019 12:50 )   PT: 15.1 sec;   INR: 1.32          PTT - ( 19 May 2019 12:50 )  PTT:75.1 sec      RADIOLOGY & ADDITIONAL TESTS: Reviewed. INTERVAL HPI/OVERNIGHT EVENTS: Spiked to 101     SUBJECTIVE: Patient seen and examined at bedside. Pt complaining of a lot of secretions today. She was very emotional about her health. She is willing to wait till tomorrow to be extubated.     CONSTITUTIONAL: No weakness, fevers or chills  EYES/ENT: No visual changes;  No vertigo or throat pain   NECK: No pain or stiffness  RESPIRATORY: No cough, wheezing, hemoptysis; No shortness of breath  CARDIOVASCULAR: No chest pain or palpitations  GASTROINTESTINAL: No abdominal or epigastric pain. No nausea, vomiting, or hematemesis; No diarrhea or constipation. No melena or hematochezia.  GENITOURINARY: No dysuria, frequency or hematuria  NEUROLOGICAL: No numbness or weakness  SKIN: No itching, rashes    OBJECTIVE:    VITAL SIGNS:  ICU Vital Signs Last 24 Hrs  T(C): 36.9 (20 May 2019 04:29), Max: 38.6 (19 May 2019 19:29)  T(F): 98.5 (20 May 2019 04:29), Max: 101.4 (19 May 2019 19:29)  HR: 65 (20 May 2019 05:55) (58 - 86)  BP: 91/45 (20 May 2019 04:00) (66/43 - 143/60)  BP(mean): 60 (20 May 2019 04:00) (48 - 133)  ABP: --  ABP(mean): --  RR: 13 (20 May 2019 05:55) (12 - 56)  SpO2: 100% (20 May 2019 05:55) (94% - 100%)    Mode: CPAP with PS, FiO2: 40, PEEP: 5, PS: 5, MAP: 7, PIP: 19    05-18 @ 07:01  -  05-19 @ 07:00  --------------------------------------------------------  IN: 2595.3 mL / OUT: 1576 mL / NET: 1019.3 mL    05-19 @ 07:01  -  05-20 @ 06:38  --------------------------------------------------------  IN: 1051 mL / OUT: 730 mL / NET: 321 mL      CAPILLARY BLOOD GLUCOSE      POCT Blood Glucose.: 139 mg/dL (20 May 2019 05:32)      PHYSICAL EXAM:    General: NAD  HEENT: NC/AT; PERRL, clear conjunctiva  Neck: supple  Respiratory: CTA b/l  Cardiovascular: +S1/S2; RRR  Abdomen: soft, NT/ND; +BS x4  Extremities: WWP, 2+ peripheral pulses b/l; no LE edema  Skin: normal color and turgor; no rash  Neurological:    MEDICATIONS:  MEDICATIONS  (STANDING):  albumin human  5% IVPB 2750 milliLiter(s) IV Intermittent once  albumin human  5% IVPB 2750 milliLiter(s) IV Intermittent once  calcium gluconate IVPB 1 Gram(s) IV Intermittent once  chlorhexidine 0.12% Liquid 15 milliLiter(s) Oral Mucosa every 12 hours  chlorhexidine 2% Cloths 1 Application(s) Topical <User Schedule>  dextrose 5%. 1000 milliLiter(s) (50 mL/Hr) IV Continuous <Continuous>  dextrose 50% Injectable 25 Gram(s) IV Push once  docusate sodium Liquid 100 milliGRAM(s) Oral daily  enoxaparin Injectable 60 milliGRAM(s) SubCutaneous every 12 hours  glycopyrrolate Injectable 0.2 milliGRAM(s) IV Push once  Heparin injectable 1000 units/mL 2000 Unit(s),Heparin injectable 1000 units/mL 2000 Unit(s) 2000 Unit(s) IV Push once  Heparin injectable 1000 units/mL 2000 Unit(s),Heparin injectable 1000 units/mL 2000 Unit(s) 2000 Unit(s) IV Push once  Heparin injectable 1000 units/mL 2000 Unit(s),Heparin injectable 1000 units/mL 2000 Unit(s),Heparin injectable 1000 units/mL 2000 Unit(s) 2000 Unit(s) IV Push once  Heparin injectable 1000 units/mL 2000 Unit(s),Heparin injectable 1000 units/mL 2000 Unit(s),Heparin injectable 1000 units/mL 2000 Unit(s) 2000 Unit(s) IV Push once  insulin glargine Injectable (LANTUS) 15 Unit(s) SubCutaneous at bedtime  insulin regular  human corrective regimen sliding scale   SubCutaneous every 6 hours  insulin regular  human recombinant 4 Unit(s) SubCutaneous every 6 hours  midodrine 15 milliGRAM(s) Oral every 8 hours  norepinephrine Infusion 0.05 MICROgram(s)/kG/Min (5.466 mL/Hr) IV Continuous <Continuous>  nystatin Powder 1 Application(s) Topical three times a day  pantoprazole   Suspension 40 milliGRAM(s) Oral daily  petrolatum Ophthalmic Ointment 1 Application(s) Both EYES three times a day  senna 2 Tablet(s) Oral at bedtime  sodium chloride 0.9% Bolus 500 milliLiter(s) IV Bolus once  zinc oxide 20% Ointment 1 Application(s) Topical three times a day    MEDICATIONS  (PRN):  dextrose 40% Gel 15 Gram(s) Oral once PRN Blood Glucose LESS THAN 70 milliGRAM(s)/deciliter  glucagon  Injectable 1 milliGRAM(s) IntraMuscular once PRN Glucose LESS THAN 70 milligrams/deciliter      ALLERGIES:  Allergies    No Known Allergies    Intolerances    IVIG PRODUCT IS PRIGIVEN OR GAMMUNEX (Unknown)      LABS:                        7.5    10.32 )-----------( 292      ( 20 May 2019 06:02 )             24.6     05-19    142  |  101  |  17  ----------------------------<  143<H>  3.6   |  31  |  0.44<L>    Ca    9.2      19 May 2019 12:50  Phos  4.0     05-19  Mg     2.1     05-19      PT/INR - ( 19 May 2019 12:50 )   PT: 15.1 sec;   INR: 1.32          PTT - ( 19 May 2019 12:50 )  PTT:75.1 sec      RADIOLOGY & ADDITIONAL TESTS: Reviewed. INTERVAL HPI/OVERNIGHT EVENTS: Spiked to 101     SUBJECTIVE: Patient seen and examined at bedside. Pt complaining of a lot of secretions today. She was very emotional about her health. She is willing to wait till tomorrow to be extubated. No new complaints     CONSTITUTIONAL: +fevers   EYES/ENT: No visual changes;  No vertigo or throat pain   NECK: No pain or stiffness  RESPIRATORY: +secretions; No shortness of breath  CARDIOVASCULAR: No chest pain or palpitations  GASTROINTESTINAL: No abdominal or epigastric pain. No nausea, vomiting, or hematemesis; No diarrhea or constipation. No melena or hematochezia.  GENITOURINARY: No dysuria, frequency or hematuria  NEUROLOGICAL: No numbness or weakness  SKIN: No itching, rashes    OBJECTIVE:    VITAL SIGNS:  ICU Vital Signs Last 24 Hrs  T(C): 36.9 (20 May 2019 04:29), Max: 38.6 (19 May 2019 19:29)  T(F): 98.5 (20 May 2019 04:29), Max: 101.4 (19 May 2019 19:29)  HR: 65 (20 May 2019 05:55) (58 - 86)  BP: 91/45 (20 May 2019 04:00) (66/43 - 143/60)  BP(mean): 60 (20 May 2019 04:00) (48 - 133)  ABP: --  ABP(mean): --  RR: 13 (20 May 2019 05:55) (12 - 56)  SpO2: 100% (20 May 2019 05:55) (94% - 100%)    Mode: CPAP with PS, FiO2: 40, PEEP: 5, PS: 5, MAP: 7, PIP: 19    05-18 @ 07:01 - 05-19 @ 07:00  --------------------------------------------------------  IN: 2595.3 mL / OUT: 1576 mL / NET: 1019.3 mL    05-19 @ 07:01  -  05-20 @ 06:38  --------------------------------------------------------  IN: 1051 mL / OUT: 730 mL / NET: 321 mL      CAPILLARY BLOOD GLUCOSE      POCT Blood Glucose.: 139 mg/dL (20 May 2019 05:32)      PHYSICAL EXAM:    General: NAD, comfortable on CPAP trial   HEENT: NCAT, PERRL, clear conjunctiva, no scleral icterus  Neck: supple, no JVD  Respiratory: good air entry bilaterally, Rhonchi bilaterally in all lung fields   Cardiovascular: RRR, normal S1S2, no M/R/G  Vascular: 1+ radial and DP pulses  Abdomen: soft, NT/ND, bowel sounds present, L colostomy w pink stoma, no palpable masses  Extremities: cool to touch, no clubbing, cyanosis, or edema  Skin: No rashes present  Neuro: 2/5 strength in feet, 2/5 in legs b/l, 5/5 RUE, 4/5 LUE.  No focal deficits    MEDICATIONS:  MEDICATIONS  (STANDING):  albumin human  5% IVPB 2750 milliLiter(s) IV Intermittent once  albumin human  5% IVPB 2750 milliLiter(s) IV Intermittent once  calcium gluconate IVPB 1 Gram(s) IV Intermittent once  chlorhexidine 0.12% Liquid 15 milliLiter(s) Oral Mucosa every 12 hours  chlorhexidine 2% Cloths 1 Application(s) Topical <User Schedule>  dextrose 5%. 1000 milliLiter(s) (50 mL/Hr) IV Continuous <Continuous>  dextrose 50% Injectable 25 Gram(s) IV Push once  docusate sodium Liquid 100 milliGRAM(s) Oral daily  enoxaparin Injectable 60 milliGRAM(s) SubCutaneous every 12 hours  glycopyrrolate Injectable 0.2 milliGRAM(s) IV Push once  Heparin injectable 1000 units/mL 2000 Unit(s),Heparin injectable 1000 units/mL 2000 Unit(s) 2000 Unit(s) IV Push once  Heparin injectable 1000 units/mL 2000 Unit(s),Heparin injectable 1000 units/mL 2000 Unit(s) 2000 Unit(s) IV Push once  Heparin injectable 1000 units/mL 2000 Unit(s),Heparin injectable 1000 units/mL 2000 Unit(s),Heparin injectable 1000 units/mL 2000 Unit(s) 2000 Unit(s) IV Push once  Heparin injectable 1000 units/mL 2000 Unit(s),Heparin injectable 1000 units/mL 2000 Unit(s),Heparin injectable 1000 units/mL 2000 Unit(s) 2000 Unit(s) IV Push once  insulin glargine Injectable (LANTUS) 15 Unit(s) SubCutaneous at bedtime  insulin regular  human corrective regimen sliding scale   SubCutaneous every 6 hours  insulin regular  human recombinant 4 Unit(s) SubCutaneous every 6 hours  midodrine 15 milliGRAM(s) Oral every 8 hours  norepinephrine Infusion 0.05 MICROgram(s)/kG/Min (5.466 mL/Hr) IV Continuous <Continuous>  nystatin Powder 1 Application(s) Topical three times a day  pantoprazole   Suspension 40 milliGRAM(s) Oral daily  petrolatum Ophthalmic Ointment 1 Application(s) Both EYES three times a day  senna 2 Tablet(s) Oral at bedtime  sodium chloride 0.9% Bolus 500 milliLiter(s) IV Bolus once  zinc oxide 20% Ointment 1 Application(s) Topical three times a day    MEDICATIONS  (PRN):  dextrose 40% Gel 15 Gram(s) Oral once PRN Blood Glucose LESS THAN 70 milliGRAM(s)/deciliter  glucagon  Injectable 1 milliGRAM(s) IntraMuscular once PRN Glucose LESS THAN 70 milligrams/deciliter      ALLERGIES:  Allergies    No Known Allergies    Intolerances    IVIG PRODUCT IS PRIGIVEN OR GAMMUNEX (Unknown)      LABS:                        7.5    10.32 )-----------( 292      ( 20 May 2019 06:02 )             24.6     05-19    142  |  101  |  17  ----------------------------<  143<H>  3.6   |  31  |  0.44<L>    Ca    9.2      19 May 2019 12:50  Phos  4.0     05-19  Mg     2.1     05-19      PT/INR - ( 19 May 2019 12:50 )   PT: 15.1 sec;   INR: 1.32          PTT - ( 19 May 2019 12:50 )  PTT:75.1 sec      RADIOLOGY & ADDITIONAL TESTS: Reviewed.

## 2019-05-20 NOTE — PROGRESS NOTE ADULT - PROBLEM SELECTOR PLAN 8
Continues to have significant setbacks during her hospitalization. Is more hopeful for recovery today and has changed her mind regarding trach.  I am not sure that she will do well- but patient is awake, alert and certainly has capacity to change her mind

## 2019-05-20 NOTE — PROGRESS NOTE ADULT - SUBJECTIVE AND OBJECTIVE BOX
Patient seen at bedside. Awake and conversant through her written table, She denies any localizing complaints and expressed her desire to have a tracheostomy and PEG placed    PE:  VSS (T Max noted to be 101 overnight - now afebrile; hypotensive episode noted at the tail end of the plasma exchange on 5/19/19 - now normotesnive0  Chest: Clear to auscultation     cor: Reg S1S2 without murmurs   Catheter site is clean and dry     Ext: SCD in place    Labs reviewed

## 2019-05-20 NOTE — PROGRESS NOTE ADULT - ATTENDING COMMENTS
UE strength gradually improving  No extraocular movement weakness  Reflexes absent except trace triceps b/l  Continue PLEX, f/u NIF.

## 2019-05-20 NOTE — PROGRESS NOTE ADULT - PROBLEM SELECTOR PLAN 1
Reintubated, receiving treatment.  Plan was for extubation once stronger without reintubation.  She now feels stronger and wishes trach and PEG with trial of extubation

## 2019-05-20 NOTE — PROGRESS NOTE ADULT - ASSESSMENT
53yo F w/ known stage IV endometrial cancer, likely with progression of disease, complex fistulae, chronic indwelling [place] admitted with fever and urosepsis originally requiring MICU admission for septic shock and respiratory failure which improved; however course c/b progressive ascending weakness while on medical telemetry/SDU despite initiation of IVIG, which was further c/b acute hypercapnic respiratory failure requiring reintubation and readmission to MICU for further management. Had shown some improvement, but 5/13 again with hypercarbic respiratory failure requiring intubation.  Has started plasmapheresis with  improvement. Palliative following for symptom management, patient/family support, and goals of care.

## 2019-05-20 NOTE — PROGRESS NOTE ADULT - PROBLEM SELECTOR PLAN 2
responding to plasmapheresis with increased strength in upper body  - prognosis overall remains guarded given her multiple chronic comorbidities, the prospect of cytotoxic chemotherapy initiation for her malignancy per GYN, and now with the third need for intubation  Change in plans regarding advance directives.  See above.  I think with her increased strength she wants to try more aggressive approach

## 2019-05-20 NOTE — PROGRESS NOTE ADULT - ASSESSMENT
60 yo F PMHx polio, breast cancer, DM, Endometrial Cancer s/p exploratory laparotomy, enterolysis, SHAMIR, BSO, pelvic lymphadenectomy, low anterior resection mobilization of splenic flexure, end colostomy on 7/30/18, rectovaginal fistula, numerous admissions for urinary tract infection presented to [facility] in the setting of urosepsis. Hospital course complicated by blood stream infection (currently on ertapenem) and respiratory failure requiring intubation s/p extubation on 5/2. Intubated urgently on 7 lachman for CO2 narcosis in setting of AIDP and stepped up to MICU, initially improving s/p IVIG and extubated to bipap on 5/10, reintubated for CO2 narcosis on 5/14 and started on plasmapheresis     CARDIOVASCULAR  #Hemodynamics  Stable  - making appropriate amounts of urine, mentating well  - Still on levophed, with increased requirements. Continue midodrine 15mg TID. Will attempt to down titrate levophed to MAP above 65    PULMONARY  #Acute hypercapnic respiratory failure   2/2 to demyelinating polyneuropathy (AIDP), patient extubated to bipap on 5/10, reintubated on 5/14 for CO2 narcosis  - s/p 4 doses of IVIG finished 5/11 and started on plasmapharesis on 5/13. Completed 4 rounds. 5th round today. Will receive every other day for a total of 5 doses   - appreciate further neurology recs  - transfusion medicine following. Appreciate further recs   - NIF's and VC daily (recent -14 NIF)      #Hx of urinary retention  - retaining on 5/8, placed joseph. Joseph changed on 5/17   - might need SP catheter  - appreciate urology recs    Neurology  #AIDP  Patient w AIDP leading to respiratory compromise, previous EMG w demyelinating polyneuropathy  - recs per neurology  - s/p 4 rounds of IVIG, with dramatic improvement in strength and respiratory status, however decompensated on 5/13 and reintubated  - f/u MRI brain w/wo contrast and MR cervical spine w/wo contrast when patient stable to go for imaging  - plasmapheresis as above under pulmonary    INFECTIOUS DISEASE  #hx of recurrent ESBL ecoli UTI and bacteremia  - previously on admission patient admitted to MICU in septic shock 2/2 to ESBL ecoli bacteremia and UTI  - Completed tx w/ ertapenem on 5/14   - Repeat UCx w/ yeast     #Fever  - Pt spiked fever on 5/17. UA positive but pt recently tx for ESBL Ecoli UTI   - CXR negative. No new infiltrates  - Fever most likely from left cephalic v. thrombus   -  BCx 5/17 NGTD    HEME/ONC  # Anemia   - Hb 8.9 today; s/p 1u pRBC's 5/18  - If Hb does not respond to transfusion, will need CT abdomen/ pelvis to r/o RP bleed  - Maintain active T&S    #Stage IV endometrial adenocarcinoma  Per GYN-ONC w/ interval increase in tumor burden. Pt was treated with Anastrazole and initiated 3 weeks of megace 80mg BID followed by 3 weeks of tamoxifen.   - Pt was on tamoxifen and developed a left cephalic v. thrombus. Tamoxifen was dc'd on 5/17   - GYN-ONC following, appreciate further recs     #Cephalic vein thrombosis  - As above  - Continue w/ Lovenox 60mg BID (5/17-)    FLUIDS/ELECTROLYTES/NUTRITION  -IVF: none  -Monitor, Replete to K>4 and Mg>2  -Diet: continue NGT feeds for now    PROPHYLAXIS  -DVT: Lovenox and SCDs  -GI: none as on feeds    DISPO: MICU    CODE STATUS: DNR/DNI, palliative following 60 yo F PMHx polio, breast cancer, DM, Endometrial Cancer s/p exploratory laparotomy, enterolysis, SHAMIR, BSO, pelvic lymphadenectomy, low anterior resection mobilization of splenic flexure, end colostomy on 7/30/18, rectovaginal fistula, numerous admissions for urinary tract infection presented to [facility] in the setting of urosepsis. Hospital course complicated by blood stream infection (currently on ertapenem) and respiratory failure requiring intubation s/p extubation on 5/2. Intubated urgently on 7 lachman for CO2 narcosis in setting of AIDP and stepped up to MICU, initially improving s/p IVIG and extubated to bipap on 5/10, reintubated for CO2 narcosis on 5/14 and started on plasmapheresis     CARDIOVASCULAR  #Hemodynamics  Stable  - making appropriate amounts of urine, mentating well  - Still on levophed, with increased requirements. Continue midodrine 15mg TID. Will attempt to down titrate levophed to MAP above 65    PULMONARY  #Acute hypercapnic respiratory failure   2/2 to demyelinating polyneuropathy (AIDP), patient extubated to bipap on 5/10, reintubated on 5/14 for CO2 narcosis  - s/p 4 doses of IVIG finished 5/11 and started on plasmapharesis on 5/13. Completed 4 rounds. 5th round tomorrow ( 5 total)  - appreciate further neurology recs  - transfusion medicine following. Appreciate further recs   - NIF's and VC daily (recent NIFs have been unreliable due to vent- will start getting manual NIF's)       #Hx of urinary retention  - Westbrook changed on 5/17     Neurology  #AIDP  Patient w AIDP leading to respiratory compromise, previous EMG w demyelinating polyneuropathy  - recs per neurology  - s/p 4 rounds of IVIG, with dramatic improvement in strength and respiratory status, however decompensated on 5/13 and reintubated  - f/u MRI brain w/wo contrast and MR cervical spine w/wo contrast when patient stable to go for imaging  - plasmapheresis as above under pulmonary ( Last round tomorrow)    INFECTIOUS DISEASE  #hx of recurrent ESBL ecoli UTI and bacteremia  - previously on admission patient admitted to MICU in septic shock 2/2 to ESBL ecoli bacteremia and UTI  - Completed tx w/ ertapenem on 5/14   - Repeat UCx w/ yeast     #Fever  - Pt spiked fever on 5/17. UA positive but UCx growing <4000 yeast  - CXR negative w/ no new infiltrates  - Fever most likely from left cephalic v. thrombus which is being treated   - Since pt otherwise clinically stable w/ low suspicion for underlying infection, will hold off on abx   -  BCx 5/17 NGTD. Repeat BCx drawn 5/19    HEME/ONC  # Anemia   - Hb 7.5 today; s/p 1u pRBC's 5/18  - If Hb continues to drop, will need CT abdomen/ pelvis to r/o RP bleed since pt is on Lovenox   - Maintain active T&S    #Stage IV endometrial adenocarcinoma  Per GYN-ONC w/ interval increase in tumor burden. Pt was treated with Anastrazole and initiated 3 weeks of megace 80mg BID followed by 3 weeks of tamoxifen.   - Pt was on tamoxifen and developed a left cephalic v. thrombus. Tamoxifen was dc'd on 5/17   - GYN-ONC following, appreciate further recs     #Cephalic vein thrombosis  - As above  - Continue w/ Lovenox 60mg BID (5/17-)    FLUIDS/ELECTROLYTES/NUTRITION  -IVF: none  -Monitor, Replete to K>4 and Mg>2  -Diet: continue NGT feeds for now    PROPHYLAXIS  -DVT: Lovenox and SCDs  -GI: none as on feeds    DISPO: MICU    CODE STATUS: DNR/DNI, palliative following 58 yo F PMHx polio, breast cancer, DM, Endometrial Cancer s/p exploratory laparotomy, enterolysis, SHAMIR, BSO, pelvic lymphadenectomy, low anterior resection mobilization of splenic flexure, end colostomy on 7/30/18, rectovaginal fistula, numerous admissions for urinary tract infection presented to [facility] in the setting of urosepsis. Hospital course complicated by blood stream infection (currently on ertapenem) and respiratory failure requiring intubation s/p extubation on 5/2. Intubated urgently on 7 lachman for CO2 narcosis in setting of AIDP and stepped up to MICU, initially improving s/p IVIG and extubated to bipap on 5/10, reintubated for CO2 narcosis on 5/14 and started on plasmapheresis     CARDIOVASCULAR  #Hemodynamics  Stable  - making appropriate amounts of urine, mentating well  - Still on levophed, with increased requirements. Continue midodrine 15mg TID. Will attempt to down titrate levophed to MAP above 65    PULMONARY  #Acute hypercapnic respiratory failure   2/2 to demyelinating polyneuropathy (AIDP), patient extubated to bipap on 5/10, reintubated on 5/14 for CO2 narcosis  - s/p 4 doses of IVIG finished 5/11 and started on plasmapharesis on 5/13. Completed 4 rounds. 5th round tomorrow ( 5 total)  - appreciate further neurology recs  - transfusion medicine following. Appreciate further recs   - NIF's and VC daily (recent NIFs have been unreliable due to vent- will start getting manual NIF's)       #Hx of urinary retention  - [place]brook changed on 5/17     Neurology  #AIDP  Patient w AIDP leading to respiratory compromise, previous EMG w demyelinating polyneuropathy  - recs per neurology  - s/p 4 rounds of IVIG, with dramatic improvement in strength and respiratory status, however decompensated on 5/13 and reintubated  - f/u MRI brain w/wo contrast and MR cervical spine w/wo contrast when patient stable to go for imaging  - plasmapheresis as above under pulmonary ( Last round tomorrow)    INFECTIOUS DISEASE  #hx of recurrent ESBL ecoli UTI and bacteremia  - previously on admission patient admitted to MICU in septic shock 2/2 to ESBL ecoli bacteremia and UTI  - Completed tx w/ ertapenem on 5/14   - Repeat UCx w/ yeast     #Fever  - Pt spiked fever on 5/17. UA positive but UCx growing <4000 yeast  - CXR negative w/ no new infiltrates  - Fever most likely from left cephalic v. thrombus which is being treated   - Since pt otherwise clinically stable w/ low suspicion for underlying infection, will hold off on abx   -  BCx 5/17 NGTD. Repeat BCx drawn 5/19    HEME/ONC  # Anemia   - Hb 7.5 today; s/p 1u pRBC's 5/18  - If Hb continues to drop, will need CT abdomen/ pelvis to r/o RP bleed since pt is on Lovenox   - Maintain active T&S    #Stage IV endometrial adenocarcinoma  Per GYN-ONC w/ interval increase in tumor burden. Pt was treated with Anastrazole and initiated 3 weeks of megace 80mg BID followed by 3 weeks of tamoxifen.   - Pt was on tamoxifen and developed a left cephalic v. thrombus. Tamoxifen was dc'd on 5/17   - GYN-ONC following, appreciate further recs     #Cephalic vein thrombosis  - As above  - Continue w/ Lovenox 60mg BID (5/17-)    FLUIDS/ELECTROLYTES/NUTRITION  -IVF: none  -Monitor, Replete to K>4 and Mg>2  -Diet: continue NGT feeds for now    PROPHYLAXIS  -DVT: Lovenox and SCDs  -GI: none as on feeds    DISPO: MICU    CODE STATUS: DNR/DNI, OK with trach and PEG

## 2019-05-20 NOTE — PROGRESS NOTE ADULT - ATTENDING COMMENTS
Patient is undergoing serial plasma exchange for treatment of a G-B like illness. She does appear to be responding, and she is now interested in undergoing the necessary procedures. I discussed her care with the [facility] medical team. There is no identified cause for her fever, and she does not appear clinically septic. Her last set of blood cultures were negative and her urine culture only showed yeast (that was felt to be colonisation). Her episode of hypotension yesterday  came at the tail end of the exchange and she had an almost complete exchange. The hypotension is felt to be part of her clinical picture with autonomic dysfunction since it has been occurring intermittently over the past few weeks. Based on the above, she is scheduled for her fifth and final exchange tomorrow afternoon.

## 2019-05-20 NOTE — CHART NOTE - NSCHARTNOTEFT_GEN_A_CORE
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 Provides: 1056mL TV, 1367kcal, 74g protein, 866mL free H2O, 106% RDI, 1.55g/kg IBW protein, 22.5 non pro kcal    PO Intake: Good (%) [   ]  Fair (50-75%) [   ] Poor (<25%) [   ]- N/A NPO w/EN    GI Issues: No N/V/C/D reported at this time. Colostomy w/100cc output overnight     Pain: Pt denies pain at this time     Skin Integrity:  Sacrum stage II pressure injury       Labs:   05-20    140  |  101  |  17  ----------------------------<  161<H>  4.0   |  30  |  0.50    Ca    9.3      20 May 2019 06:02  Phos  4.2     05-20  Mg     2.0     05-20    TPro  6.0  /  Alb  4.1  /  TBili  0.5  /  DBili  x   /  AST  11  /  ALT  <5<L>  /  AlkPhos  34<L>  05-20    CAPILLARY BLOOD GLUCOSE      POCT Blood Glucose.: 144 mg/dL (20 May 2019 11:18)  POCT Blood Glucose.: 139 mg/dL (20 May 2019 05:32)  POCT Blood Glucose.: 138 mg/dL (19 May 2019 23:40)  POCT Blood Glucose.: 146 mg/dL (19 May 2019 16:47)      Medications:  MEDICATIONS  (STANDING):  ascorbic acid 500 milliGRAM(s) Oral daily  chlorhexidine 0.12% Liquid 15 milliLiter(s) Oral Mucosa every 12 hours  chlorhexidine 2% Cloths 1 Application(s) Topical <User Schedule>  dextrose 5%. 1000 milliLiter(s) (50 mL/Hr) IV Continuous <Continuous>  dextrose 50% Injectable 25 Gram(s) IV Push once  docusate sodium Liquid 100 milliGRAM(s) Oral daily  enoxaparin Injectable 60 milliGRAM(s) SubCutaneous every 12 hours  glycopyrrolate Injectable 0.2 milliGRAM(s) IV Push once  insulin glargine Injectable (LANTUS) 15 Unit(s) SubCutaneous at bedtime  insulin regular  human corrective regimen sliding scale   SubCutaneous every 6 hours  insulin regular  human recombinant 4 Unit(s) SubCutaneous every 6 hours  midodrine 15 milliGRAM(s) Oral every 8 hours  norepinephrine Infusion 0.05 MICROgram(s)/kG/Min (5.466 mL/Hr) IV Continuous <Continuous>  nystatin Powder 1 Application(s) Topical three times a day  pantoprazole   Suspension 40 milliGRAM(s) Oral daily  petrolatum Ophthalmic Ointment 1 Application(s) Both EYES three times a day  senna 2 Tablet(s) Oral at bedtime  sodium chloride 0.9% Bolus 500 milliLiter(s) IV Bolus once  zinc oxide 20% Ointment 1 Application(s) Topical three times a day    MEDICATIONS  (PRN):  dextrose 40% Gel 15 Gram(s) Oral once PRN Blood Glucose LESS THAN 70 milliGRAM(s)/deciliter  glucagon  Injectable 1 milliGRAM(s) IntraMuscular once PRN Glucose LESS THAN 70 milligrams/deciliter      Weight: 58.3kg (4/30)  56.2kg (5/9)    Weight Change:   2kg weight loss since admit; needs being met inconsistently     Estimated energy needs:   Ideal body weight (47.7kg) used for calculations as pt >120% of IBW.   Nutrient needs based on [facility] standards of care for maintenance in older adults.   needs adjusted for age, PU stage 2, catabolic illness, vent  1190-1428kcal/day (25-30kcal/kg)  67-76g pro/day (1.4-1.6g/kg)  1428-1666ml fluid/day (30-35ml/kg)    Subjective:   53yo F known to this RD from prior admission with known stage IV endometrial cancer, likely with progression of disease, complex fistulae, chronic indwelling Westbrook admitted with fever and urosepsis. Pt intubated 2/2 acute hypoxic respiratory failure, and successfully extubated on 5/2. Pt transferred to [facility], and started on PO diet. Required reintubation on 5/8 2/2 CO2 narcosis likely d/t respiratory muscle insufficiency/AIDP. S/p IVIG and s/p LP. Pt extubated on 5/10, though reintubated on 5/13 for CO2 narcosis. Started on plasmapheresis. Pt seen in room and discussed during MICU rounds. She is very tearful and frustrated this AM. Per pt's conversation with pulm fellow, she is now agreeable to trach/PEG should she fail next extubation (being optimized w/last plasmapheresis treatment tomorrow, then planned for extubation). Pt intubated on CPAP trial, awake, alert.  MAP 77, levophed running for BP support. EN running at goal w/good tolerance. Colostomy w/stool. Lytes WNL. Will continue to keep nutrition aligned with GOC at all times.     Previous Nutrition Diagnosis:  Increased nutrient needs RT increased demand for kcal/pro AEB wt loss, pressure ulcer, catabolic illness    Active [  X ]  Resolved [   ]    If resolved, new PES:     Goal:  Pt to consistently meet % of estimated needs via tolerated route     Recommendations:  1. Continue with current EN order while pt is intubated. Monitor for s/s intolerance; maintain aspiration precautions at all times   2. Once she is extubated and passes dysphagia screen, recommend Consistent Carbohydrate Diet with Ensure Enlive TID (1050 kcal, 60g protein, 540mL free H2O)   3. Pain and bowel regimens per team   4. Keep nutrition aligned with GOC at all times     Education:   not appropriate at this time     Risk Level: High [  X ] Moderate [   ] Low [   ]

## 2019-05-20 NOTE — PROGRESS NOTE ADULT - SUBJECTIVE AND OBJECTIVE BOX
TIM MCDANIEL             [id number]    CC:  weakness, [facility]    HPI:  60 yo G0 w/ known stage IV endometrial carcinoma s/p staging surgery 7/2018 and 1 cycle of chemotherapy in 2/2019 followed by multiple admissions for management of symptomatic enterovaginal fistula, complex fistula associated urinary tract infection, bacteremia presents on referral from [facility] after fever developing fever.  Found to have severe sepsis, required intubation and treatment of UTI,.  Initially improved but then declined neurologically and required intubation for hypercapnic respiratory failure.  Was extubated and did well for several days.    SUBJECTIVE:  Patient reintubated 5/13 secondary to profound weakness.  Became much more awake when hypercapnia corrected. Has undergone 4 rounds of plasmapheresis and Today reports feeling stronger.  In the past she has been adamant that she does not want a tracheostomy or a PEG and plan was for extubation after her next plasmapheresis and no reintubation.    This morning she reports that she feels so much stronger that she will "beat this".  She wants to be extubated and if she has trouble she wants to be reintubated.    I emphasized that she had two choices- trach and PEG prior to extubation, or extubation without reintubation.  After consideration and further discussion, she wishes to be trached and have PEG placed understanding that this does not guarantee that she can come off vent, or get home- the chances I quoted were 50/50 although I told her I wasn't sure    Patient admits she feels stronger in neck  shoulders and arms.  Shows me that she has more had control.  Denies pain.      ROS:  DYSPNEA: No  NAUS/VOM: No  SECRETIONS: Yes, some difficulty clearing secretions	  AGITATION: No  Pain (Y/N): No  -Provocation/Palliation:  -Quality/Quantity:  -Radiating:  -Severity:  -Timing/Frequency:  -Impact on ADLs:    OTHER REVIEW OF SYSTEMS: no other complaints      PEx:  Vital Signs Last 24 Hrs  T(C): 37.4 (20 May 2019 14:00), Max: 38.6 (19 May 2019 19:29)  T(F): 99.3 (20 May 2019 14:00), Max: 101.4 (19 May 2019 19:29)  HR: 68 (20 May 2019 13:00) (60 - 86)  BP: 86/50 (20 May 2019 13:00) (72/43 - 167/77)  BP(mean): 66 (20 May 2019 13:00) (55 - 114)  RR: 25 (20 May 2019 13:00) (12 - 56)  SpO2: 100% (20 May 2019 13:00) (94% - 100%)    General: frail female, chronically ill appearing  comfortable in bed ; appearing older than stated age, pale, engaged in conversation and writing on a pad without difficulty  HEENT: moderate alopecia; conjunctival pallor;  orally intubated, NGT in place  Neck: supple, but appears stronger with more head control.  Able to shake head vigorously "yes" and "no"  CVS: S1/S2, RRR  Resp: on ventilator  GI: soft, non-tender  Musc: hypotrophic muscularity and bulk, particularly of UE/hands, LE; profound kyphoscoliosis muscle wasting of left trapezius and latissimus dorsi  Neuro: awake and alert; shrugs shoulders moderate neck strength, squeezes my hand  Psych: overall in better spirits  Skin: intact	     ALLERGIES: IVIG PRODUCT IS PRIGIVEN OR GAMMUNEX (Unknown)  No Known Allergies    OPIATE NAÏVE (Y/N): Yes on presentation, had received opiates earlier in admission    MEDICATIONS: REVIEWED  MEDICATIONS  (STANDING):  ascorbic acid 500 milliGRAM(s) Oral daily  chlorhexidine 0.12% Liquid 15 milliLiter(s) Oral Mucosa every 12 hours  chlorhexidine 2% Cloths 1 Application(s) Topical <User Schedule>  dextrose 5%. 1000 milliLiter(s) (50 mL/Hr) IV Continuous <Continuous>  dextrose 50% Injectable 25 Gram(s) IV Push once  docusate sodium Liquid 100 milliGRAM(s) Oral daily  enoxaparin Injectable 60 milliGRAM(s) SubCutaneous every 12 hours  glycopyrrolate Injectable 0.2 milliGRAM(s) IV Push once  insulin glargine Injectable (LANTUS) 15 Unit(s) SubCutaneous at bedtime  insulin regular  human corrective regimen sliding scale   SubCutaneous every 6 hours  insulin regular  human recombinant 4 Unit(s) SubCutaneous every 6 hours  midodrine 15 milliGRAM(s) Oral every 8 hours  norepinephrine Infusion 0.05 MICROgram(s)/kG/Min (5.466 mL/Hr) IV Continuous <Continuous>  nystatin Powder 1 Application(s) Topical three times a day  pantoprazole   Suspension 40 milliGRAM(s) Oral daily  petrolatum Ophthalmic Ointment 1 Application(s) Both EYES three times a day  senna 2 Tablet(s) Oral at bedtime  sodium chloride 0.9% Bolus 500 milliLiter(s) IV Bolus once  zinc oxide 20% Ointment 1 Application(s) Topical three times a day    MEDICATIONS  (PRN):  dextrose 40% Gel 15 Gram(s) Oral once PRN Blood Glucose LESS THAN 70 milliGRAM(s)/deciliter  glucagon  Injectable 1 milliGRAM(s) IntraMuscular once PRN Glucose LESS THAN 70 milligrams/deciliter                LABS: REVIEWED                                              7.5    10.32 )-----------( 292      ( 20 May 2019 06:02 )             24.6   05-20    140  |  101  |  17  ----------------------------<  161<H>  4.0   |  30  |  0.50    Ca    9.3      20 May 2019 06:02  Phos  4.2     05-20  Mg     2.0     05-20    TPro  6.0  /  Alb  4.1  /  TBili  0.5  /  DBili  x   /  AST  11  /  ALT  <5<L>  /  AlkPhos  34<L>  05-20                                    IMAGING: REVIEWED    ADVANCED DIRECTIVES:     see note above, DNR, ayan    DECISION MAKER: patient/self  LEGAL SURROGATE: Esha Avila (sister) [phone]    PSYCHOSOCIAL-SPIRITUAL ASSESSMENT:       Reviewed       Care plan as below	    GOALS OF CARE DISCUSSION       Palliative care info/counseling provided previously.  Awaiting arrival of Esha roblero	           Family meeting       Advanced Directives addressed please see Advance Care Planning Note	           See previous Palliative Medicine Notes       Documentation of GOC: Agrees with efforts to treat,     agrees to trach and PEG  	      AGENCY CHOICE DISCUSSED:     Too acute to discuss    REFERRALS	        Palliative Med        Unit SW/Case Mgmt        - declined       Speech/Swallow - failed dysphagia; has NGT currently       Patient/Family Support       Massage Therapy       Music Therapy       Hospice - not applicable at present       Nutrition       PT/OT

## 2019-05-21 DIAGNOSIS — J96.10 CHRONIC RESPIRATORY FAILURE, UNSPECIFIED WHETHER WITH HYPOXIA OR HYPERCAPNIA: ICD-10-CM

## 2019-05-21 LAB
ALBUMIN SERPL ELPH-MCNC: 3.4 G/DL — SIGNIFICANT CHANGE UP (ref 3.3–5)
ALP SERPL-CCNC: 50 U/L — SIGNIFICANT CHANGE UP (ref 40–120)
ALT FLD-CCNC: 7 U/L — LOW (ref 10–45)
ANION GAP SERPL CALC-SCNC: 13 MMOL/L — SIGNIFICANT CHANGE UP (ref 5–17)
APTT BLD: 30.9 SEC — SIGNIFICANT CHANGE UP (ref 27.5–36.3)
AST SERPL-CCNC: 19 U/L — SIGNIFICANT CHANGE UP (ref 10–40)
BASOPHILS # BLD AUTO: 0.01 K/UL — SIGNIFICANT CHANGE UP (ref 0–0.2)
BASOPHILS NFR BLD AUTO: 0.1 % — SIGNIFICANT CHANGE UP (ref 0–2)
BILIRUB SERPL-MCNC: 0.5 MG/DL — SIGNIFICANT CHANGE UP (ref 0.2–1.2)
BUN SERPL-MCNC: 19 MG/DL — SIGNIFICANT CHANGE UP (ref 7–23)
CALCIUM SERPL-MCNC: 9.6 MG/DL — SIGNIFICANT CHANGE UP (ref 8.4–10.5)
CHLORIDE SERPL-SCNC: 101 MMOL/L — SIGNIFICANT CHANGE UP (ref 96–108)
CO2 SERPL-SCNC: 26 MMOL/L — SIGNIFICANT CHANGE UP (ref 22–31)
CREAT SERPL-MCNC: 0.46 MG/DL — LOW (ref 0.5–1.3)
EOSINOPHIL # BLD AUTO: 0.24 K/UL — SIGNIFICANT CHANGE UP (ref 0–0.5)
EOSINOPHIL NFR BLD AUTO: 3 % — SIGNIFICANT CHANGE UP (ref 0–6)
FIBRINOGEN PPP-MCNC: 390 MG/DL — SIGNIFICANT CHANGE UP (ref 258–438)
GLUCOSE BLDC GLUCOMTR-MCNC: 109 MG/DL — HIGH (ref 70–99)
GLUCOSE BLDC GLUCOMTR-MCNC: 123 MG/DL — HIGH (ref 70–99)
GLUCOSE BLDC GLUCOMTR-MCNC: 97 MG/DL — SIGNIFICANT CHANGE UP (ref 70–99)
GLUCOSE BLDC GLUCOMTR-MCNC: 98 MG/DL — SIGNIFICANT CHANGE UP (ref 70–99)
GLUCOSE SERPL-MCNC: 94 MG/DL — SIGNIFICANT CHANGE UP (ref 70–99)
HCT VFR BLD CALC: 23.9 % — LOW (ref 34.5–45)
HCT VFR BLD CALC: 26.1 % — LOW (ref 34.5–45)
HGB BLD-MCNC: 7.3 G/DL — LOW (ref 11.5–15.5)
HGB BLD-MCNC: 7.9 G/DL — LOW (ref 11.5–15.5)
IMM GRANULOCYTES NFR BLD AUTO: 0.5 % — SIGNIFICANT CHANGE UP (ref 0–1.5)
INR BLD: 1.06 — SIGNIFICANT CHANGE UP (ref 0.88–1.16)
LYMPHOCYTES # BLD AUTO: 2.2 K/UL — SIGNIFICANT CHANGE UP (ref 1–3.3)
LYMPHOCYTES # BLD AUTO: 27.1 % — SIGNIFICANT CHANGE UP (ref 13–44)
MAGNESIUM SERPL-MCNC: 2.1 MG/DL — SIGNIFICANT CHANGE UP (ref 1.6–2.6)
MCHC RBC-ENTMCNC: 27.9 PG — SIGNIFICANT CHANGE UP (ref 27–34)
MCHC RBC-ENTMCNC: 28.2 PG — SIGNIFICANT CHANGE UP (ref 27–34)
MCHC RBC-ENTMCNC: 30.3 GM/DL — LOW (ref 32–36)
MCHC RBC-ENTMCNC: 30.5 GM/DL — LOW (ref 32–36)
MCV RBC AUTO: 92.2 FL — SIGNIFICANT CHANGE UP (ref 80–100)
MCV RBC AUTO: 92.3 FL — SIGNIFICANT CHANGE UP (ref 80–100)
MONOCYTES # BLD AUTO: 0.98 K/UL — HIGH (ref 0–0.9)
MONOCYTES NFR BLD AUTO: 12.1 % — SIGNIFICANT CHANGE UP (ref 2–14)
NEUTROPHILS # BLD AUTO: 4.64 K/UL — SIGNIFICANT CHANGE UP (ref 1.8–7.4)
NEUTROPHILS NFR BLD AUTO: 57.2 % — SIGNIFICANT CHANGE UP (ref 43–77)
NRBC # BLD: 0 /100 WBCS — SIGNIFICANT CHANGE UP (ref 0–0)
NRBC # BLD: 0 /100 WBCS — SIGNIFICANT CHANGE UP (ref 0–0)
PHOSPHATE SERPL-MCNC: 4.5 MG/DL — SIGNIFICANT CHANGE UP (ref 2.5–4.5)
PLATELET # BLD AUTO: 348 K/UL — SIGNIFICANT CHANGE UP (ref 150–400)
PLATELET # BLD AUTO: 438 K/UL — HIGH (ref 150–400)
POTASSIUM SERPL-MCNC: 4.4 MMOL/L — SIGNIFICANT CHANGE UP (ref 3.5–5.3)
POTASSIUM SERPL-SCNC: 4.4 MMOL/L — SIGNIFICANT CHANGE UP (ref 3.5–5.3)
PROT SERPL-MCNC: 6.2 G/DL — SIGNIFICANT CHANGE UP (ref 6–8.3)
PROTHROM AB SERPL-ACNC: 12 SEC — SIGNIFICANT CHANGE UP (ref 10–12.9)
RBC # BLD: 2.59 M/UL — LOW (ref 3.8–5.2)
RBC # BLD: 2.83 M/UL — LOW (ref 3.8–5.2)
RBC # FLD: 16.3 % — HIGH (ref 10.3–14.5)
RBC # FLD: 16.4 % — HIGH (ref 10.3–14.5)
SODIUM SERPL-SCNC: 140 MMOL/L — SIGNIFICANT CHANGE UP (ref 135–145)
WBC # BLD: 12.92 K/UL — HIGH (ref 3.8–10.5)
WBC # BLD: 8.11 K/UL — SIGNIFICANT CHANGE UP (ref 3.8–10.5)
WBC # FLD AUTO: 12.92 K/UL — HIGH (ref 3.8–10.5)
WBC # FLD AUTO: 8.11 K/UL — SIGNIFICANT CHANGE UP (ref 3.8–10.5)

## 2019-05-21 PROCEDURE — 31600 PLANNED TRACHEOSTOMY: CPT

## 2019-05-21 PROCEDURE — 99231 SBSQ HOSP IP/OBS SF/LOW 25: CPT

## 2019-05-21 PROCEDURE — 71045 X-RAY EXAM CHEST 1 VIEW: CPT | Mod: 26

## 2019-05-21 PROCEDURE — 99291 CRITICAL CARE FIRST HOUR: CPT

## 2019-05-21 PROCEDURE — 99233 SBSQ HOSP IP/OBS HIGH 50: CPT

## 2019-05-21 RX ORDER — MIDAZOLAM HYDROCHLORIDE 1 MG/ML
10 INJECTION, SOLUTION INTRAMUSCULAR; INTRAVENOUS ONCE
Refills: 0 | Status: DISCONTINUED | OUTPATIENT
Start: 2019-05-21 | End: 2019-05-21

## 2019-05-21 RX ORDER — CISATRACURIUM BESYLATE 2 MG/ML
12 INJECTION INTRAVENOUS ONCE
Refills: 0 | Status: DISCONTINUED | OUTPATIENT
Start: 2019-05-21 | End: 2019-05-21

## 2019-05-21 RX ORDER — CEPHALEXIN 500 MG
1000 CAPSULE ORAL ONCE
Refills: 0 | Status: COMPLETED | OUTPATIENT
Start: 2019-05-21 | End: 2019-05-21

## 2019-05-21 RX ORDER — NYSTATIN CREAM 100000 [USP'U]/G
1 CREAM TOPICAL
Refills: 0 | Status: DISCONTINUED | OUTPATIENT
Start: 2019-05-21 | End: 2019-06-19

## 2019-05-21 RX ORDER — PROPOFOL 10 MG/ML
20 INJECTION, EMULSION INTRAVENOUS
Qty: 1000 | Refills: 0 | Status: DISCONTINUED | OUTPATIENT
Start: 2019-05-21 | End: 2019-05-21

## 2019-05-21 RX ORDER — FENTANYL CITRATE 50 UG/ML
100 INJECTION INTRAVENOUS ONCE
Refills: 0 | Status: DISCONTINUED | OUTPATIENT
Start: 2019-05-21 | End: 2019-05-21

## 2019-05-21 RX ORDER — FENTANYL CITRATE 50 UG/ML
0.5 INJECTION INTRAVENOUS
Qty: 2500 | Refills: 0 | Status: DISCONTINUED | OUTPATIENT
Start: 2019-05-21 | End: 2019-05-21

## 2019-05-21 RX ORDER — FENTANYL CITRATE 50 UG/ML
50 INJECTION INTRAVENOUS ONCE
Refills: 0 | Status: DISCONTINUED | OUTPATIENT
Start: 2019-05-21 | End: 2019-05-21

## 2019-05-21 RX ADMIN — FENTANYL CITRATE 2.92 MICROGRAM(S)/KG/HR: 50 INJECTION INTRAVENOUS at 10:00

## 2019-05-21 RX ADMIN — MIDODRINE HYDROCHLORIDE 15 MILLIGRAM(S): 2.5 TABLET ORAL at 07:10

## 2019-05-21 RX ADMIN — CHLORHEXIDINE GLUCONATE 15 MILLILITER(S): 213 SOLUTION TOPICAL at 21:59

## 2019-05-21 RX ADMIN — ZINC OXIDE 1 APPLICATION(S): 200 OINTMENT TOPICAL at 15:02

## 2019-05-21 RX ADMIN — FENTANYL CITRATE 50 MICROGRAM(S): 50 INJECTION INTRAVENOUS at 11:34

## 2019-05-21 RX ADMIN — PANTOPRAZOLE SODIUM 40 MILLIGRAM(S): 20 TABLET, DELAYED RELEASE ORAL at 12:12

## 2019-05-21 RX ADMIN — ZINC OXIDE 1 APPLICATION(S): 200 OINTMENT TOPICAL at 21:57

## 2019-05-21 RX ADMIN — Medication 100 MILLIGRAM(S): at 12:12

## 2019-05-21 RX ADMIN — MIDODRINE HYDROCHLORIDE 15 MILLIGRAM(S): 2.5 TABLET ORAL at 21:59

## 2019-05-21 RX ADMIN — SENNA PLUS 2 TABLET(S): 8.6 TABLET ORAL at 21:59

## 2019-05-21 RX ADMIN — Medication 1000 MILLIGRAM(S): at 19:15

## 2019-05-21 RX ADMIN — CHLORHEXIDINE GLUCONATE 15 MILLILITER(S): 213 SOLUTION TOPICAL at 09:59

## 2019-05-21 RX ADMIN — MIDAZOLAM HYDROCHLORIDE 10 MILLIGRAM(S): 1 INJECTION, SOLUTION INTRAMUSCULAR; INTRAVENOUS at 12:16

## 2019-05-21 RX ADMIN — MIDODRINE HYDROCHLORIDE 15 MILLIGRAM(S): 2.5 TABLET ORAL at 15:02

## 2019-05-21 RX ADMIN — NYSTATIN CREAM 1 APPLICATION(S): 100000 CREAM TOPICAL at 19:14

## 2019-05-21 RX ADMIN — ZINC OXIDE 1 APPLICATION(S): 200 OINTMENT TOPICAL at 07:05

## 2019-05-21 RX ADMIN — Medication 500 MILLIGRAM(S): at 12:12

## 2019-05-21 RX ADMIN — CHLORHEXIDINE GLUCONATE 1 APPLICATION(S): 213 SOLUTION TOPICAL at 07:10

## 2019-05-21 RX ADMIN — PROPOFOL 7 MICROGRAM(S)/KG/MIN: 10 INJECTION, EMULSION INTRAVENOUS at 08:32

## 2019-05-21 RX ADMIN — NYSTATIN CREAM 1 APPLICATION(S): 100000 CREAM TOPICAL at 07:05

## 2019-05-21 RX ADMIN — FENTANYL CITRATE 100 MICROGRAM(S): 50 INJECTION INTRAVENOUS at 09:59

## 2019-05-21 NOTE — CONSULT NOTE ADULT - SUBJECTIVE AND OBJECTIVE BOX
HPI:  58 yo G0 w/ known stage IV endometrial carcinoma s/p staging surgery 7/2018 and 1 cycle of chemotherapy in 2/2019 followed by multiple admissions for management of symptomatic enterovaginal fistula, complex fistula associated urinary tract infection. Hospital course complicated by blood stream infection (esbl) and respiratory failure requiring intubation. 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 Plan for trach and peg        Allergies    No Known Allergies    Intolerances    IVIG PRODUCT IS PRIGIVEN OR GAMMUNEX (Unknown)    Home Medications:  enoxaparin:  (29 Apr 2019 21:26)  megestrol 40 mg oral tablet: 2 tab(s) orally 2 times a day (29 Apr 2019 21:26)  metFORMIN 1000 mg oral tablet: 1 tab(s) orally every 12 hours (29 Apr 2019 21:26)  Mylanta oral suspension: orally once a day (29 Apr 2019 21:26)  omeprazole 20 mg oral delayed release capsule: 1 cap(s) orally once a day (29 Apr 2019 21:26)  tamoxifen 20 mg oral tablet: 1 tab(s) orally once a day (29 Apr 2019 21:26)  Thera-Tabs M oral tablet: 1 tab(s) orally once a day (29 Apr 2019 21:26)  Zofran: 4 milligram(s) intramuscular once a day (29 Apr 2019 21:26)    MEDICATIONS:  MEDICATIONS  (STANDING):  albumin human  5% IVPB 2750 milliLiter(s) IV Intermittent once  ascorbic acid 500 milliGRAM(s) Oral daily  calcium gluconate IVPB 1 Gram(s) IV Intermittent once  chlorhexidine 0.12% Liquid 15 milliLiter(s) Oral Mucosa every 12 hours  chlorhexidine 2% Cloths 1 Application(s) Topical <User Schedule>  cisatracurium Injectable 12 milliGRAM(s) IV Push once  dextrose 5%. 1000 milliLiter(s) (50 mL/Hr) IV Continuous <Continuous>  dextrose 50% Injectable 25 Gram(s) IV Push once  docusate sodium Liquid 100 milliGRAM(s) Oral daily  fentaNYL   Infusion. 0.5 MICROgram(s)/kG/Hr (2.915 mL/Hr) IV Continuous <Continuous>  Heparin 1000 Units / ml 2 milliLiter(s) 2 milliLiter(s) IV Push once  insulin regular  human corrective regimen sliding scale   SubCutaneous every 6 hours  midodrine 15 milliGRAM(s) Oral every 8 hours  norepinephrine Infusion 0.05 MICROgram(s)/kG/Min (5.466 mL/Hr) IV Continuous <Continuous>  nystatin Powder 1 Application(s) Topical three times a day  pantoprazole   Suspension 40 milliGRAM(s) Oral daily  petrolatum Ophthalmic Ointment 1 Application(s) Both EYES three times a day  propofol Infusion 20 MICROgram(s)/kG/Min (6.996 mL/Hr) IV Continuous <Continuous>  senna 2 Tablet(s) Oral at bedtime  zinc oxide 20% Ointment 1 Application(s) Topical three times a day    MEDICATIONS  (PRN):  dextrose 40% Gel 15 Gram(s) Oral once PRN Blood Glucose LESS THAN 70 milliGRAM(s)/deciliter  glucagon  Injectable 1 milliGRAM(s) IntraMuscular once PRN Glucose LESS THAN 70 milligrams/deciliter    PAST MEDICAL & SURGICAL HISTORY:  Diabetes: type 2  Gait disorder: gait and mobility disorder  Breast CA  Fistula: fistula of vagina to large intestine  Pleural effusion  Muscle weakness: generalized  Malignant neoplasm: endometrium  History of total abdominal hysterectomy and bilateral salpingo-oophorectomy: with resection of endometrial mass, low anterior resection and pelvic lymphadenectomy    FAMILY HISTORY:  no pertinent GI FH    SOCIAL HISTORY:  [place]: denies  Alcohol: denies  Illicit Drugs: denies    REVIEW OF SYSTEMS:  unable to assess    Vital Signs Last 24 Hrs  T(C): 37.8 (21 May 2019 09:12), Max: 37.8 (21 May 2019 09:12)  T(F): 100.1 (21 May 2019 09:12), Max: 100.1 (21 May 2019 09:12)  HR: 72 (21 May 2019 10:00) (58 - 84)  BP: 131/77 (21 May 2019 10:00) (79/45 - 158/73)  BP(mean): 99 (21 May 2019 10:00) (54 - 100)  RR: 29 (21 May 2019 10:00) (12 - 31)  SpO2: 100% (21 May 2019 10:00) (98% - 100%)    05-20 @ 07:01  -  05-21 @ 07:00  --------------------------------------------------------  IN: 760 mL / OUT: 880 mL / NET: -120 mL    05-21 @ 07:01  -  05-21 @ 10:46  --------------------------------------------------------  IN: 58 mL / OUT: 200 mL / NET: -142 mL      Mode: AC/ CMV (Assist Control/ Continuous Mandatory Ventilation)  RR (machine): 12  TV (machine): 320  FiO2: 40  PEEP: 5  ITime: 1  MAP: 7.4  PIP: 17    PHYSICAL EXAM:    General: intubated in no acute distress  Eyes: Anicteric sclerae, moist conjunctivae  HENT: Moist mucous membranes  Neck: Trachea midline, supple  Lungs: Normal respiratory effors and no intercostal retractions  Cardiovascular: RRR  Abdomen: Soft, non-tender non-distended; Normal bowel sounds; No rebound or guarding, midline scar, ostomy bad with no stool  Extremities: Normal range of motion, No clubbing, cyanosis or edema  Neurological: Alert and oriented x3  Skin: Warm and dry. No obvious rash    LABS:                        7.3    8.11  )-----------( 348      ( 21 May 2019 06:08 )             23.9     05-21    140  |  101  |  19  ----------------------------<  94  4.4   |  26  |  0.46<L>    Ca    9.6      21 May 2019 05:22  Phos  4.5     05-21  Mg     2.1     05-21    TPro  6.2  /  Alb  3.4  /  TBili  0.5  /  DBili  x   /  AST  19  /  ALT  7<L>  /  AlkPhos  50  05-21      Culture Results:   Yeast (05-21 @ 02:01)    PT/INR - ( 21 May 2019 06:08 )   PT: 12.0 sec;   INR: 1.06          PTT - ( 21 May 2019 06:08 )  PTT:30.9 sec    Culture - Blood (collected 19 May 2019 20:46)  Source: .Blood Blood  Preliminary Report (20 May 2019 21:01):    No growth at 1 day.    Culture - Blood (collected 19 May 2019 20:46)  Source: .Blood Blood  Preliminary Report (20 May 2019 21:01):    No growth at 1 day.      RADIOLOGY & ADDITIONAL STUDIES:

## 2019-05-21 NOTE — PROCEDURE NOTE - NSAPPROACH_GEN_A_CORE
via natural/artificial opening
via natural/artificial opening
via natural/artificial opening endoscopic

## 2019-05-21 NOTE — PROGRESS NOTE ADULT - SUBJECTIVE AND OBJECTIVE BOX
GYN Progress Note    PENDING      Vital Signs Last 24 Hrs  T(C): 36.6 (21 May 2019 05:01), Max: 37.4 (20 May 2019 14:00)  T(F): 97.9 (21 May 2019 05:01), Max: 99.3 (20 May 2019 14:00)  HR: 60 (21 May 2019 05:00) (58 - 84)  BP: 108/59 (21 May 2019 05:00) (79/45 - 167/77)  BP(mean): 83 (21 May 2019 05:00) (54 - 114)  RR: 12 (21 May 2019 05:00) (12 - 45)  SpO2: 100% (21 May 2019 05:00) (97% - 100%)    Physical Exam:  Gen: No Acute Distress  Pulm: Clear to auscultation bilaterally  GI: soft, appropriately nontender, nondistended, +BS, no rebound, no guarding.  Incision C/D/I  Ext: SCDs in place, wnl    I&O's Summary    19 May 2019 07:01  -  20 May 2019 07:00  --------------------------------------------------------  IN: 1055 mL / OUT: 835 mL / NET: 220 mL    20 May 2019 07:01  -  21 May 2019 06:05  --------------------------------------------------------  IN: 760 mL / OUT: 730 mL / NET: 30 mL      MEDICATIONS  (STANDING):  albumin human  5% IVPB 2750 milliLiter(s) IV Intermittent once  ascorbic acid 500 milliGRAM(s) Oral daily  calcium gluconate IVPB 1 Gram(s) IV Intermittent once  chlorhexidine 0.12% Liquid 15 milliLiter(s) Oral Mucosa every 12 hours  chlorhexidine 2% Cloths 1 Application(s) Topical <User Schedule>  dextrose 5%. 1000 milliLiter(s) (50 mL/Hr) IV Continuous <Continuous>  dextrose 50% Injectable 25 Gram(s) IV Push once  docusate sodium Liquid 100 milliGRAM(s) Oral daily  glycopyrrolate Injectable 0.2 milliGRAM(s) IV Push once  Heparin 1000 Units / ml 2 milliLiter(s) 2 milliLiter(s) IV Push once  insulin glargine Injectable (LANTUS) 15 Unit(s) SubCutaneous at bedtime  insulin regular  human corrective regimen sliding scale   SubCutaneous every 6 hours  insulin regular  human recombinant 4 Unit(s) SubCutaneous every 6 hours  midodrine 15 milliGRAM(s) Oral every 8 hours  norepinephrine Infusion 0.05 MICROgram(s)/kG/Min (5.466 mL/Hr) IV Continuous <Continuous>  nystatin Powder 1 Application(s) Topical three times a day  pantoprazole   Suspension 40 milliGRAM(s) Oral daily  petrolatum Ophthalmic Ointment 1 Application(s) Both EYES three times a day  senna 2 Tablet(s) Oral at bedtime  sodium chloride 0.9% Bolus 500 milliLiter(s) IV Bolus once  zinc oxide 20% Ointment 1 Application(s) Topical three times a day    MEDICATIONS  (PRN):  dextrose 40% Gel 15 Gram(s) Oral once PRN Blood Glucose LESS THAN 70 milliGRAM(s)/deciliter  glucagon  Injectable 1 milliGRAM(s) IntraMuscular once PRN Glucose LESS THAN 70 milligrams/deciliter      LABS:                        7.5    10.32 )-----------( 292      ( 20 May 2019 06:02 )             24.6     05-21    140  |  101  |  19  ----------------------------<  94  4.4   |  26  |  0.46<L>    Ca    9.6      21 May 2019 05:22  Phos  4.5     05-21  Mg     2.1     05-21    TPro  6.2  /  Alb  3.4  /  TBili  0.5  /  DBili  x   /  AST  19  /  ALT  7<L>  /  AlkPhos  50  05-21    PT/INR - ( 19 May 2019 12:50 )   PT: 15.1 sec;   INR: 1.32          PTT - ( 19 May 2019 12:50 )  PTT:75.1 sec GYN Progress Note    Patient seen at the bedside.  On Levophed (weaning again).  Was on CPAP most of the night but now AC.  Complaining of throat pain; looking forward to the trach.  LE dopplers performed but no results.    Vital Signs Last 24 Hrs  T(C): 36.6 (21 May 2019 05:01), Max: 37.4 (20 May 2019 14:00)  T(F): 97.9 (21 May 2019 05:01), Max: 99.3 (20 May 2019 14:00)  HR: 60 (21 May 2019 05:00) (58 - 84)  BP: 108/59 (21 May 2019 05:00) (79/45 - 167/77)  BP(mean): 83 (21 May 2019 05:00) (54 - 114)  RR: 12 (21 May 2019 05:00) (12 - 45)  SpO2: 100% (21 May 2019 05:00) (97% - 100%)    Physical Exam:  Gen: No Acute Distress, tearful  Pulm: intubated  GI: soft, appropriately nontender  [facility] [facility]    I&O's Summary    19 May 2019 07:01  -  20 May 2019 07:00  --------------------------------------------------------  IN: 1055 mL / OUT: 835 mL / NET: 220 mL    20 May 2019 07:01  -  21 May 2019 06:05  --------------------------------------------------------  IN: 760 mL / OUT: 730 mL / NET: 30 mL      MEDICATIONS  (STANDING):  albumin human  5% IVPB 2750 milliLiter(s) IV Intermittent once  ascorbic acid 500 milliGRAM(s) Oral daily  calcium gluconate IVPB 1 Gram(s) IV Intermittent once  chlorhexidine 0.12% Liquid 15 milliLiter(s) Oral Mucosa every 12 hours  chlorhexidine 2% Cloths 1 Application(s) Topical <User Schedule>  dextrose 5%. 1000 milliLiter(s) (50 mL/Hr) IV Continuous <Continuous>  dextrose 50% Injectable 25 Gram(s) IV Push once  docusate sodium Liquid 100 milliGRAM(s) Oral daily  glycopyrrolate Injectable 0.2 milliGRAM(s) IV Push once  Heparin 1000 Units / ml 2 milliLiter(s) 2 milliLiter(s) IV Push once  insulin glargine Injectable (LANTUS) 15 Unit(s) SubCutaneous at bedtime  insulin regular  human corrective regimen sliding scale   SubCutaneous every 6 hours  insulin regular  human recombinant 4 Unit(s) SubCutaneous every 6 hours  midodrine 15 milliGRAM(s) Oral every 8 hours  norepinephrine Infusion 0.05 MICROgram(s)/kG/Min (5.466 mL/Hr) IV Continuous <Continuous>  nystatin Powder 1 Application(s) Topical three times a day  pantoprazole   Suspension 40 milliGRAM(s) Oral daily  petrolatum Ophthalmic Ointment 1 Application(s) Both EYES three times a day  senna 2 Tablet(s) Oral at bedtime  sodium chloride 0.9% Bolus 500 milliLiter(s) IV Bolus once  zinc oxide 20% Ointment 1 Application(s) Topical three times a day    MEDICATIONS  (PRN):  dextrose 40% Gel 15 Gram(s) Oral once PRN Blood Glucose LESS THAN 70 milliGRAM(s)/deciliter  glucagon  Injectable 1 milliGRAM(s) IntraMuscular once PRN Glucose LESS THAN 70 milligrams/deciliter      LABS:                        7.5    10.32 )-----------( 292      ( 20 May 2019 06:02 )             24.6     05-21    140  |  101  |  19  ----------------------------<  94  4.4   |  26  |  0.46<L>    Ca    9.6      21 May 2019 05:22  Phos  4.5     05-21  Mg     2.1     05-21    TPro  6.2  /  Alb  3.4  /  TBili  0.5  /  DBili  x   /  AST  19  /  ALT  7<L>  /  AlkPhos  50  05-21    PT/INR - ( 19 May 2019 12:50 )   PT: 15.1 sec;   INR: 1.32          PTT - ( 19 May 2019 12:50 )  PTT:75.1 sec

## 2019-05-21 NOTE — PROGRESS NOTE ADULT - SUBJECTIVE AND OBJECTIVE BOX
INTERVAL HPI/OVERNIGHT EVENTS:    SUBJECTIVE: Patient seen and examined at bedside.     CONSTITUTIONAL: No weakness, fevers or chills  EYES/ENT: No visual changes;  No vertigo or throat pain   NECK: No pain or stiffness  RESPIRATORY: No cough, wheezing, hemoptysis; No shortness of breath  CARDIOVASCULAR: No chest pain or palpitations  GASTROINTESTINAL: No abdominal or epigastric pain. No nausea, vomiting, or hematemesis; No diarrhea or constipation. No melena or hematochezia.  GENITOURINARY: No dysuria, frequency or hematuria  NEUROLOGICAL: No numbness or weakness  SKIN: No itching, rashes    OBJECTIVE:    VITAL SIGNS:  ICU Vital Signs Last 24 Hrs  T(C): 36.6 (21 May 2019 05:01), Max: 37.4 (20 May 2019 14:00)  T(F): 97.9 (21 May 2019 05:01), Max: 99.3 (20 May 2019 14:00)  HR: 72 (21 May 2019 06:05) (58 - 84)  BP: 108/59 (21 May 2019 05:00) (79/45 - 167/77)  BP(mean): 83 (21 May 2019 05:00) (54 - 114)  ABP: --  ABP(mean): --  RR: 19 (21 May 2019 06:05) (12 - 45)  SpO2: 100% (21 May 2019 06:05) (97% - 100%)    Mode: AC/ CMV (Assist Control/ Continuous Mandatory Ventilation), RR (machine): 12, TV (machine): 320, FiO2: 40, PEEP: 5, MAP: 9, PIP: 19    05-19 @ 07:01  -  05-20 @ 07:00  --------------------------------------------------------  IN: 1055 mL / OUT: 835 mL / NET: 220 mL    05-20 @ 07:01  -  05-21 @ 06:21  --------------------------------------------------------  IN: 760 mL / OUT: 730 mL / NET: 30 mL      CAPILLARY BLOOD GLUCOSE      POCT Blood Glucose.: 97 mg/dL (21 May 2019 05:41)      PHYSICAL EXAM:    General: NAD  HEENT: NC/AT; PERRL, clear conjunctiva  Neck: supple  Respiratory: CTA b/l  Cardiovascular: +S1/S2; RRR  Abdomen: soft, NT/ND; +BS x4  Extremities: WWP, 2+ peripheral pulses b/l; no LE edema  Skin: normal color and turgor; no rash  Neurological:    MEDICATIONS:  MEDICATIONS  (STANDING):  albumin human  5% IVPB 2750 milliLiter(s) IV Intermittent once  ascorbic acid 500 milliGRAM(s) Oral daily  calcium gluconate IVPB 1 Gram(s) IV Intermittent once  chlorhexidine 0.12% Liquid 15 milliLiter(s) Oral Mucosa every 12 hours  chlorhexidine 2% Cloths 1 Application(s) Topical <User Schedule>  dextrose 5%. 1000 milliLiter(s) (50 mL/Hr) IV Continuous <Continuous>  dextrose 50% Injectable 25 Gram(s) IV Push once  docusate sodium Liquid 100 milliGRAM(s) Oral daily  glycopyrrolate Injectable 0.2 milliGRAM(s) IV Push once  Heparin 1000 Units / ml 2 milliLiter(s) 2 milliLiter(s) IV Push once  insulin glargine Injectable (LANTUS) 15 Unit(s) SubCutaneous at bedtime  insulin regular  human corrective regimen sliding scale   SubCutaneous every 6 hours  insulin regular  human recombinant 4 Unit(s) SubCutaneous every 6 hours  midodrine 15 milliGRAM(s) Oral every 8 hours  norepinephrine Infusion 0.05 MICROgram(s)/kG/Min (5.466 mL/Hr) IV Continuous <Continuous>  nystatin Powder 1 Application(s) Topical three times a day  pantoprazole   Suspension 40 milliGRAM(s) Oral daily  petrolatum Ophthalmic Ointment 1 Application(s) Both EYES three times a day  senna 2 Tablet(s) Oral at bedtime  sodium chloride 0.9% Bolus 500 milliLiter(s) IV Bolus once  zinc oxide 20% Ointment 1 Application(s) Topical three times a day    MEDICATIONS  (PRN):  dextrose 40% Gel 15 Gram(s) Oral once PRN Blood Glucose LESS THAN 70 milliGRAM(s)/deciliter  glucagon  Injectable 1 milliGRAM(s) IntraMuscular once PRN Glucose LESS THAN 70 milligrams/deciliter      ALLERGIES:  Allergies    No Known Allergies    Intolerances    IVIG PRODUCT IS PRIGIVEN OR GAMMUNEX (Unknown)      LABS:                        7.3    8.11  )-----------( 348      ( 21 May 2019 06:08 )             23.9     05-21    140  |  101  |  19  ----------------------------<  94  4.4   |  26  |  0.46<L>    Ca    9.6      21 May 2019 05:22  Phos  4.5     05-21  Mg     2.1     05-21    TPro  6.2  /  Alb  3.4  /  TBili  0.5  /  DBili  x   /  AST  19  /  ALT  7<L>  /  AlkPhos  50  05-21    PT/INR - ( 19 May 2019 12:50 )   PT: 15.1 sec;   INR: 1.32          PTT - ( 19 May 2019 12:50 )  PTT:75.1 sec      RADIOLOGY & ADDITIONAL TESTS: Reviewed. INTERVAL HPI/OVERNIGHT EVENTS: NPO at midnight for trach this afternoon    SUBJECTIVE: Patient seen and examined at bedside. She is ready to have the tube out. She feels much stronger today.     CONSTITUTIONAL: No fevers or chills  EYES/ENT: No visual changes;  No vertigo or throat pain   NECK: No pain or stiffness  RESPIRATORY: No cough, wheezing, hemoptysis; No shortness of breath  CARDIOVASCULAR: No chest pain or palpitations  GASTROINTESTINAL: No abdominal or epigastric pain. No nausea, vomiting, or hematemesis; No diarrhea or constipation. No melena or hematochezia.  GENITOURINARY: No dysuria, frequency or hematuria  NEUROLOGICAL: No numbness or weakness  SKIN: No itching, rashes    OBJECTIVE:    VITAL SIGNS:  ICU Vital Signs Last 24 Hrs  T(C): 36.6 (21 May 2019 05:01), Max: 37.4 (20 May 2019 14:00)  T(F): 97.9 (21 May 2019 05:01), Max: 99.3 (20 May 2019 14:00)  HR: 72 (21 May 2019 06:05) (58 - 84)  BP: 108/59 (21 May 2019 05:00) (79/45 - 167/77)  BP(mean): 83 (21 May 2019 05:00) (54 - 114)  ABP: --  ABP(mean): --  RR: 19 (21 May 2019 06:05) (12 - 45)  SpO2: 100% (21 May 2019 06:05) (97% - 100%)    Mode: AC/ CMV (Assist Control/ Continuous Mandatory Ventilation), RR (machine): 12, TV (machine): 320, FiO2: 40, PEEP: 5, MAP: 9, PIP: 19    05-19 @ 07:01  -  05-20 @ 07:00  --------------------------------------------------------  IN: 1055 mL / OUT: 835 mL / NET: 220 mL    05-20 @ 07:01  -  05-21 @ 06:21  --------------------------------------------------------  IN: 760 mL / OUT: 730 mL / NET: 30 mL      CAPILLARY BLOOD GLUCOSE      POCT Blood Glucose.: 97 mg/dL (21 May 2019 05:41)      PHYSICAL EXAM:    General: NAD  HEENT: NC/AT; PERRL, clear conjunctiva  Neck: supple  Respiratory: CTA b/l  Cardiovascular: +S1/S2; RRR  Abdomen: soft, NT/ND; +BS x4  Extremities: WWP, 2+ peripheral pulses b/l; no LE edema  Skin: normal color and turgor; no rash  Neurological:    MEDICATIONS:  MEDICATIONS  (STANDING):  albumin human  5% IVPB 2750 milliLiter(s) IV Intermittent once  ascorbic acid 500 milliGRAM(s) Oral daily  calcium gluconate IVPB 1 Gram(s) IV Intermittent once  chlorhexidine 0.12% Liquid 15 milliLiter(s) Oral Mucosa every 12 hours  chlorhexidine 2% Cloths 1 Application(s) Topical <User Schedule>  dextrose 5%. 1000 milliLiter(s) (50 mL/Hr) IV Continuous <Continuous>  dextrose 50% Injectable 25 Gram(s) IV Push once  docusate sodium Liquid 100 milliGRAM(s) Oral daily  glycopyrrolate Injectable 0.2 milliGRAM(s) IV Push once  Heparin 1000 Units / ml 2 milliLiter(s) 2 milliLiter(s) IV Push once  insulin glargine Injectable (LANTUS) 15 Unit(s) SubCutaneous at bedtime  insulin regular  human corrective regimen sliding scale   SubCutaneous every 6 hours  insulin regular  human recombinant 4 Unit(s) SubCutaneous every 6 hours  midodrine 15 milliGRAM(s) Oral every 8 hours  norepinephrine Infusion 0.05 MICROgram(s)/kG/Min (5.466 mL/Hr) IV Continuous <Continuous>  nystatin Powder 1 Application(s) Topical three times a day  pantoprazole   Suspension 40 milliGRAM(s) Oral daily  petrolatum Ophthalmic Ointment 1 Application(s) Both EYES three times a day  senna 2 Tablet(s) Oral at bedtime  sodium chloride 0.9% Bolus 500 milliLiter(s) IV Bolus once  zinc oxide 20% Ointment 1 Application(s) Topical three times a day    MEDICATIONS  (PRN):  dextrose 40% Gel 15 Gram(s) Oral once PRN Blood Glucose LESS THAN 70 milliGRAM(s)/deciliter  glucagon  Injectable 1 milliGRAM(s) IntraMuscular once PRN Glucose LESS THAN 70 milligrams/deciliter      ALLERGIES:  Allergies    No Known Allergies    Intolerances    IVIG PRODUCT IS PRIGIVEN OR GAMMUNEX (Unknown)      LABS:                        7.3    8.11  )-----------( 348      ( 21 May 2019 06:08 )             23.9     05-21    140  |  101  |  19  ----------------------------<  94  4.4   |  26  |  0.46<L>    Ca    9.6      21 May 2019 05:22  Phos  4.5     05-21  Mg     2.1     05-21    TPro  6.2  /  Alb  3.4  /  TBili  0.5  /  DBili  x   /  AST  19  /  ALT  7<L>  /  AlkPhos  50  05-21    PT/INR - ( 19 May 2019 12:50 )   PT: 15.1 sec;   INR: 1.32          PTT - ( 19 May 2019 12:50 )  PTT:75.1 sec      RADIOLOGY & ADDITIONAL TESTS: Reviewed. INTERVAL HPI/OVERNIGHT EVENTS: NPO at midnight for trach this afternoon    SUBJECTIVE: Patient seen and examined at bedside. She is ready to have the tube out. She feels much stronger today. She has less secretions today     CONSTITUTIONAL: No fevers or chills  EYES/ENT: No visual changes;  No vertigo or throat pain   NECK: No pain or stiffness  RESPIRATORY: No cough, wheezing, hemoptysis; No shortness of breath  CARDIOVASCULAR: No chest pain or palpitations  GASTROINTESTINAL: No abdominal or epigastric pain. No nausea, vomiting, or hematemesis; No diarrhea or constipation. No melena or hematochezia.  GENITOURINARY: No dysuria, frequency or hematuria  NEUROLOGICAL: +weakness     OBJECTIVE:    VITAL SIGNS:  ICU Vital Signs Last 24 Hrs  T(C): 36.6 (21 May 2019 05:01), Max: 37.4 (20 May 2019 14:00)  T(F): 97.9 (21 May 2019 05:01), Max: 99.3 (20 May 2019 14:00)  HR: 72 (21 May 2019 06:05) (58 - 84)  BP: 108/59 (21 May 2019 05:00) (79/45 - 167/77)  BP(mean): 83 (21 May 2019 05:00) (54 - 114)  ABP: --  ABP(mean): --  RR: 19 (21 May 2019 06:05) (12 - 45)  SpO2: 100% (21 May 2019 06:05) (97% - 100%)    Mode: AC/ CMV (Assist Control/ Continuous Mandatory Ventilation), RR (machine): 12, TV (machine): 320, FiO2: 40, PEEP: 5, MAP: 9, PIP: 19    05-19 @ 07:01  -  05-20 @ 07:00  --------------------------------------------------------  IN: 1055 mL / OUT: 835 mL / NET: 220 mL    05-20 @ 07:01  -  05-21 @ 06:21  --------------------------------------------------------  IN: 760 mL / OUT: 730 mL / NET: 30 mL      CAPILLARY BLOOD GLUCOSE      POCT Blood Glucose.: 97 mg/dL (21 May 2019 05:41)      PHYSICAL EXAM:    General: NAD, comfortable w/ NGT in place   HEENT: NCAT, PERRL, clear conjunctiva, no scleral icterus  Neck: supple, no JVD  Respiratory:  Rhonchi bilaterally upper> lower lung fields   Cardiovascular: RRR, normal S1S2, no M/R/G  Vascular: 1+ radial and DP pulses  Abdomen: soft, NT/ND, bowel sounds present, L colostomy w pink stoma, no palpable masses  Extremities: cool to touch, no clubbing, cyanosis, or edema  Skin: No rashes present  Neuro: Awake and alert, following all commands. 2/5 in LE b/l, 4/5 UE b/l  No focal deficits    MEDICATIONS:  MEDICATIONS  (STANDING):  albumin human  5% IVPB 2750 milliLiter(s) IV Intermittent once  ascorbic acid 500 milliGRAM(s) Oral daily  calcium gluconate IVPB 1 Gram(s) IV Intermittent once  chlorhexidine 0.12% Liquid 15 milliLiter(s) Oral Mucosa every 12 hours  chlorhexidine 2% Cloths 1 Application(s) Topical <User Schedule>  dextrose 5%. 1000 milliLiter(s) (50 mL/Hr) IV Continuous <Continuous>  dextrose 50% Injectable 25 Gram(s) IV Push once  docusate sodium Liquid 100 milliGRAM(s) Oral daily  glycopyrrolate Injectable 0.2 milliGRAM(s) IV Push once  Heparin 1000 Units / ml 2 milliLiter(s) 2 milliLiter(s) IV Push once  insulin glargine Injectable (LANTUS) 15 Unit(s) SubCutaneous at bedtime  insulin regular  human corrective regimen sliding scale   SubCutaneous every 6 hours  insulin regular  human recombinant 4 Unit(s) SubCutaneous every 6 hours  midodrine 15 milliGRAM(s) Oral every 8 hours  norepinephrine Infusion 0.05 MICROgram(s)/kG/Min (5.466 mL/Hr) IV Continuous <Continuous>  nystatin Powder 1 Application(s) Topical three times a day  pantoprazole   Suspension 40 milliGRAM(s) Oral daily  petrolatum Ophthalmic Ointment 1 Application(s) Both EYES three times a day  senna 2 Tablet(s) Oral at bedtime  sodium chloride 0.9% Bolus 500 milliLiter(s) IV Bolus once  zinc oxide 20% Ointment 1 Application(s) Topical three times a day    MEDICATIONS  (PRN):  dextrose 40% Gel 15 Gram(s) Oral once PRN Blood Glucose LESS THAN 70 milliGRAM(s)/deciliter  glucagon  Injectable 1 milliGRAM(s) IntraMuscular once PRN Glucose LESS THAN 70 milligrams/deciliter      ALLERGIES:  Allergies    No Known Allergies    Intolerances    IVIG PRODUCT IS PRIGIVEN OR GAMMUNEX (Unknown)      LABS:                        7.3    8.11  )-----------( 348      ( 21 May 2019 06:08 )             23.9     05-21    140  |  101  |  19  ----------------------------<  94  4.4   |  26  |  0.46<L>    Ca    9.6      21 May 2019 05:22  Phos  4.5     05-21  Mg     2.1     05-21    TPro  6.2  /  Alb  3.4  /  TBili  0.5  /  DBili  x   /  AST  19  /  ALT  7<L>  /  AlkPhos  50  05-21    PT/INR - ( 19 May 2019 12:50 )   PT: 15.1 sec;   INR: 1.32          PTT - ( 19 May 2019 12:50 )  PTT:75.1 sec      RADIOLOGY & ADDITIONAL TESTS: Reviewed.

## 2019-05-21 NOTE — PROGRESS NOTE ADULT - SUBJECTIVE AND OBJECTIVE BOX
Patient was seen at bedside before and during plasma exchange. She was sedated in preparation for her trach and PEG placement, and her BP was controlled with high doses of pressors that are now being weaned, She continues to have fluctuating BP readings; the MICU staff is at her bedside as well. Labs were reviewed and are stable. This will be her final (5/5) plasma exchange. Please let us know if there are any additional transfusion medicine issues.

## 2019-05-21 NOTE — PROGRESS NOTE ADULT - ATTENDING COMMENTS
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 monthly IVIG vs. subcutaneous Ig vs. corticosteroids (likely not the latter due to risk of infection)  Pt agreed to trach and PEG  Will continue to re-evaluate

## 2019-05-21 NOTE — PROGRESS NOTE ADULT - ASSESSMENT
PENDING    58yo F HD23 with stage IV endometrial adenocarcinoma s/p staging surgery '18 and 1 round of chemotherapy 2/19 readmitted from [facility] with fever, previously admitted for management of symptomatic rectovaginal and enterovaginal fistulas. Presents on referral from [facility] with urosepsis. Rapid response called due to worsening respiratory status s/p intubation and MICU admission. She then was on regional GYN service however had respiratory weakness and was transferred to Medicine/tele HD9 until requiring intubation again HD10 and is now in MICU. 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 She has been on plasmapheresis. She as re-intubated on 5/13 due to respiratory distress. She was made DNR/DNI 5/14. 5/17 found upper extremity DVT.   Management per MICU.    1. Neuro: Neurology following- weakness secondary Guillain [place] versus exacerbation of AIDP- now status post IVIG x4 day course, Patient has likely failed IVIG treatment given current status, now undergoing plasmapheresis, next tx 5/21  2. Pulm: Currently intubated, was Extubated 5/10-transitioned to BiPAP initially, then on nasal canula 4L. re-intubated 5/13 due to respiratory distress,  now DNR/DNI. tolerating CPAP trails daily.  3. Cardio: off pressors, hypertensive  4. FEN/GI: Dobhoff in situ, on tube feeds. repletion electrolytes prn  5. : Adequate urine output, Joseph in place. f/u final urine cx.   - Joseph placed 5/8 after finding of overflow incontinence, replaced joseph 5/18 and nabil UCx- prelim growing yeast, f/u final results,f/u urology recs regarding suprapubic catheter   6. ID: fever of 101.3 at 1130am on 5/17, blood cultures obtained, UCx taken 5/18 after placement of new joseph  -s/p ertapenem ended 5/14, s/p meropenem, s/p vanc, appreciate ID recs   7. Endocrine: FS, ISS, Lantus 15u qhs (started 5/14), Humulin 4u q6hr, Metformin 1000mg BID held at this time   8. VTE prophylaxis - SCDs, Lovenox 60mg BID given upper extremity DVT noted on 5/17   9. GYN malignancy  -Stopped anastrazole and initiated 3 weeks of megace 80mg BID followed by 3 weeks of tamoxifen.   - Started megace and metformin 4/10, was on Tamoxifen for 21 days (5/1/19-  5/17)- stopped given upper extremity DVT , once patient recovers from acute neurologic issue will reassess cancer treatment options   -  Avoid immunotherapy at this time given associated risk of autoimmune disease   10. Derm: monitor sacrum for s/s of pressure ulcer, now stage 2  11. PT/OT needs evaluation, OOB with nursing staff  12. Social work/palliative: DNR/DNI  . consider psych consult for depression. 58yo F HD23 with stage IV endometrial adenocarcinoma s/p staging surgery '18 and 1 round of chemotherapy 2/19 readmitted from [facility] with fever, previously admitted for management of symptomatic rectovaginal and enterovaginal fistulas. Presents on referral from [facility] with urosepsis. Rapid response called due to worsening respiratory status s/p intubation and MICU admission. She then was on regional GYN service however had respiratory weakness and was transferred to Medicine/tele HD9 until requiring intubation again HD10 and is now in MICU. 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 She has been on plasmapheresis. She as re-intubated on 5/13 due to respiratory distress. She was made DNR/DNI 5/14. 5/17 found upper extremity DVT.   Management per MICU.    1. Neuro: Neurology following- weakness secondary Guillain [place] versus exacerbation of AIDP- now status post IVIG x4 day course, Patient has likely failed IVIG treatment given current status, now undergoing plasmapheresis, next tx 5/21  2. Pulm: Currently intubated, was Extubated 5/10-transitioned to BiPAP initially, then on nasal canula 4L. re-intubated 5/13 due to respiratory distress,  now DNR/DNI. tolerating CPAP trails daily. PLAN FOR TRACH TODAY.  3. Cardio: on/off pressors, hypotensive  4. FEN/GI: Dobhoff in situ, on tube feeds. repletion electrolytes prn  5. : Adequate urine output, Joseph in place. f/u final urine cx.   - Joseph placed 5/8 after finding of overflow incontinence, replaced joseph 5/18 and nabil UCx- prelim growing yeast, f/u final results,f/u urology recs regarding suprapubic catheter   6. ID: fever of 101.3 at 1130am on 5/17, blood cultures obtained, UCx taken 5/18 after placement of new joseph  -s/p ertapenem ended 5/14, s/p meropenem, s/p vanc, appreciate ID recs   7. Endocrine: FS, ISS, Lantus 15u qhs (started 5/14), Humulin 4u q6hr, Metformin 1000mg BID held at this time   8. VTE prophylaxis - SCDs, Lovenox 60mg BID given upper extremity DVT noted on 5/17   9. GYN malignancy  -Stopped anastrazole and initiated 3 weeks of megace 80mg BID followed by 3 weeks of tamoxifen.   - Started megace and metformin 4/10, was on Tamoxifen for 21 days (5/1/19-  5/17)- stopped given upper extremity DVT , once patient recovers from acute neurologic issue will reassess cancer treatment options   -  Avoid immunotherapy at this time given associated risk of autoimmune disease   10. Derm: monitor sacrum for s/s of pressure ulcer, now stage 2  11. PT/OT needs evaluation, OOB with nursing staff  12. Social work/palliative: DNR/DNI  . consider psych consult for depression.

## 2019-05-21 NOTE — PROGRESS NOTE ADULT - SUBJECTIVE AND OBJECTIVE BOX
NEUROLOGY CONSULT PROGRESS NOTE    INTERVAL HISTORY:    Patient agreed to trach and peg. No acute events overnight, afebrile.    REVIEW OF SYSTEMS:  As per HPI, otherwise negative for Constitutional, Eyes, Ears/Nose/Mouth/Throat, Neck, Cardiovascular, Respiratory, Gastrointestinal, Genitourinary, Skin, Endocrine, Musculoskeletal, Psychiatric, and Hematologic/Lymphatic.    MEDICATIONS:  albumin human  5% IVPB 2750 milliLiter(s) IV Intermittent once  ascorbic acid 500 milliGRAM(s) Oral daily  calcium gluconate IVPB 1 Gram(s) IV Intermittent once  chlorhexidine 0.12% Liquid 15 milliLiter(s) Oral Mucosa every 12 hours  chlorhexidine 2% Cloths 1 Application(s) Topical <User Schedule>  dextrose 40% Gel 15 Gram(s) Oral once PRN  dextrose 5%. 1000 milliLiter(s) IV Continuous <Continuous>  dextrose 50% Injectable 25 Gram(s) IV Push once  docusate sodium Liquid 100 milliGRAM(s) Oral daily  glucagon  Injectable 1 milliGRAM(s) IntraMuscular once PRN  glycopyrrolate Injectable 0.2 milliGRAM(s) IV Push once  Heparin 1000 Units / ml 2 milliLiter(s) 2 milliLiter(s) IV Push once  insulin glargine Injectable (LANTUS) 15 Unit(s) SubCutaneous at bedtime  insulin regular  human corrective regimen sliding scale   SubCutaneous every 6 hours  insulin regular  human recombinant 4 Unit(s) SubCutaneous every 6 hours  midodrine 15 milliGRAM(s) Oral every 8 hours  norepinephrine Infusion 0.05 MICROgram(s)/kG/Min IV Continuous <Continuous>  nystatin Powder 1 Application(s) Topical three times a day  pantoprazole   Suspension 40 milliGRAM(s) Oral daily  petrolatum Ophthalmic Ointment 1 Application(s) Both EYES three times a day  senna 2 Tablet(s) Oral at bedtime  sodium chloride 0.9% Bolus 500 milliLiter(s) IV Bolus once  zinc oxide 20% Ointment 1 Application(s) Topical three times a day    VITAL SIGNS:  Vital Signs Last 24 Hrs  T(C): 36.6 (21 May 2019 05:01), Max: 37.4 (20 May 2019 14:00)  T(F): 97.9 (21 May 2019 05:01), Max: 99.3 (20 May 2019 14:00)  HR: 62 (21 May 2019 07:00) (58 - 84)  BP: 152/73 (21 May 2019 06:05) (79/45 - 158/73)  BP(mean): 93 (21 May 2019 06:05) (54 - 100)  RR: 20 (21 May 2019 07:00) (12 - 45)  SpO2: 100% (21 May 2019 07:00) (98% - 100%)    PHYSICAL EXAMINATION:  Constitutional: Frail middle age female, intubated, NG tube in place  Eyes: Conjunctiva and sclera clear  Neurologic:  - Mental Status:  Awake and alert, able to follow commands as directed  - Cranial Nerves II-XII:    II: Pupils are equal, round, and reactive to light.  III, IV, VI:  EOMI, horizontal nystagmus with lateral gaze  V:  Facial sensation is intact  VII:  Strong eye closure   VIII:  Hearing is intact to finger rub.  XI: Mildly improved shoulder shrug, able to move head  - Motor: Hypotrophic muscles. Upper extremities 4/5; RLE 2+/5; LLE 2/5, Ankle 0/5  - Reflexes:  1+ triceps, otherwise absent. No babinski response.   - Sensory:  Intact to light touch  - Gait: Deferred    LABS:                          7.3    8.11  )-----------( 348      ( 21 May 2019 06:08 )             23.9     05-21    140  |  101  |  19  ----------------------------<  94  4.4   |  26  |  0.46<L>    Ca    9.6      21 May 2019 05:22  Phos  4.5     05-21  Mg     2.1     05-21    TPro  6.2  /  Alb  3.4  /  TBili  0.5  /  DBili  x   /  AST  19  /  ALT  7<L>  /  AlkPhos  50  05-21    PT/INR - ( 21 May 2019 06:08 )   PT: 12.0 sec;   INR: 1.06          PTT - ( 21 May 2019 06:08 )  PTT:30.9 sec      RADIOLOGY & ADDITIONAL STUDIES: NEUROLOGY CONSULT PROGRESS NOTE    INTERVAL HISTORY:    Patient agreed to trach and peg. No acute events overnight, afebrile. Continues to show slow neurological improvement.     REVIEW OF SYSTEMS:  As per HPI, otherwise negative for Constitutional, Eyes, Ears/Nose/Mouth/Throat, Neck, Cardiovascular, Respiratory, Gastrointestinal, Genitourinary, Skin, Endocrine, Musculoskeletal, Psychiatric, and Hematologic/Lymphatic.    MEDICATIONS:  albumin human  5% IVPB 2750 milliLiter(s) IV Intermittent once  ascorbic acid 500 milliGRAM(s) Oral daily  calcium gluconate IVPB 1 Gram(s) IV Intermittent once  chlorhexidine 0.12% Liquid 15 milliLiter(s) Oral Mucosa every 12 hours  chlorhexidine 2% Cloths 1 Application(s) Topical <User Schedule>  dextrose 40% Gel 15 Gram(s) Oral once PRN  dextrose 5%. 1000 milliLiter(s) IV Continuous <Continuous>  dextrose 50% Injectable 25 Gram(s) IV Push once  docusate sodium Liquid 100 milliGRAM(s) Oral daily  glucagon  Injectable 1 milliGRAM(s) IntraMuscular once PRN  glycopyrrolate Injectable 0.2 milliGRAM(s) IV Push once  Heparin 1000 Units / ml 2 milliLiter(s) 2 milliLiter(s) IV Push once  insulin glargine Injectable (LANTUS) 15 Unit(s) SubCutaneous at bedtime  insulin regular  human corrective regimen sliding scale   SubCutaneous every 6 hours  insulin regular  human recombinant 4 Unit(s) SubCutaneous every 6 hours  midodrine 15 milliGRAM(s) Oral every 8 hours  norepinephrine Infusion 0.05 MICROgram(s)/kG/Min IV Continuous <Continuous>  nystatin Powder 1 Application(s) Topical three times a day  pantoprazole   Suspension 40 milliGRAM(s) Oral daily  petrolatum Ophthalmic Ointment 1 Application(s) Both EYES three times a day  senna 2 Tablet(s) Oral at bedtime  sodium chloride 0.9% Bolus 500 milliLiter(s) IV Bolus once  zinc oxide 20% Ointment 1 Application(s) Topical three times a day    VITAL SIGNS:  Vital Signs Last 24 Hrs  T(C): 36.6 (21 May 2019 05:01), Max: 37.4 (20 May 2019 14:00)  T(F): 97.9 (21 May 2019 05:01), Max: 99.3 (20 May 2019 14:00)  HR: 62 (21 May 2019 07:00) (58 - 84)  BP: 152/73 (21 May 2019 06:05) (79/45 - 158/73)  BP(mean): 93 (21 May 2019 06:05) (54 - 100)  RR: 20 (21 May 2019 07:00) (12 - 45)  SpO2: 100% (21 May 2019 07:00) (98% - 100%)    PHYSICAL EXAMINATION:  Constitutional: Frail middle age female, intubated, NG tube in place  Eyes: Conjunctiva and sclera clear  Neurologic:  - Mental Status:  Awake and alert, able to follow commands as directed  - Cranial Nerves II-XII:    II: Pupils are equal, round, and reactive to light.  III, IV, VI:  EOMI, horizontal nystagmus with lateral gaze  V:  Facial sensation is intact  VII:  Strong eye closure   VIII:  Hearing is intact to finger rub.  XI: Mildly improved shoulder shrug, able to move head  - Motor: Hypotrophic muscles. Upper extremities 4/5; RLE 2+/5; LLE 2/5, Ankle 0/5  - Reflexes:  1+ triceps, otherwise absent. No babinski response.   - Sensory:  Intact to light touch  - Gait: Deferred    LABS:                          7.3    8.11  )-----------( 348      ( 21 May 2019 06:08 )             23.9     05-21    140  |  101  |  19  ----------------------------<  94  4.4   |  26  |  0.46<L>    Ca    9.6      21 May 2019 05:22  Phos  4.5     05-21  Mg     2.1     05-21    TPro  6.2  /  Alb  3.4  /  TBili  0.5  /  DBili  x   /  AST  19  /  ALT  7<L>  /  AlkPhos  50  05-21    PT/INR - ( 21 May 2019 06:08 )   PT: 12.0 sec;   INR: 1.06          PTT - ( 21 May 2019 06:08 )  PTT:30.9 sec      RADIOLOGY & ADDITIONAL STUDIES: NEUROLOGY CONSULT PROGRESS NOTE    INTERVAL HISTORY:    Patient agreed to trach and peg. No acute events overnight, afebrile. Continues to show slow neurological improvement.     REVIEW OF SYSTEMS:  As per HPI, otherwise negative for Constitutional, Eyes, Ears/Nose/Mouth/Throat, Neck, Cardiovascular, Respiratory, Gastrointestinal, Genitourinary, Skin, Endocrine, Musculoskeletal, Psychiatric, and Hematologic/Lymphatic.    MEDICATIONS:  albumin human  5% IVPB 2750 milliLiter(s) IV Intermittent once  ascorbic acid 500 milliGRAM(s) Oral daily  calcium gluconate IVPB 1 Gram(s) IV Intermittent once  chlorhexidine 0.12% Liquid 15 milliLiter(s) Oral Mucosa every 12 hours  chlorhexidine 2% Cloths 1 Application(s) Topical <User Schedule>  dextrose 40% Gel 15 Gram(s) Oral once PRN  dextrose 5%. 1000 milliLiter(s) IV Continuous <Continuous>  dextrose 50% Injectable 25 Gram(s) IV Push once  docusate sodium Liquid 100 milliGRAM(s) Oral daily  glucagon  Injectable 1 milliGRAM(s) IntraMuscular once PRN  glycopyrrolate Injectable 0.2 milliGRAM(s) IV Push once  Heparin 1000 Units / ml 2 milliLiter(s) 2 milliLiter(s) IV Push once  insulin glargine Injectable (LANTUS) 15 Unit(s) SubCutaneous at bedtime  insulin regular  human corrective regimen sliding scale   SubCutaneous every 6 hours  insulin regular  human recombinant 4 Unit(s) SubCutaneous every 6 hours  midodrine 15 milliGRAM(s) Oral every 8 hours  norepinephrine Infusion 0.05 MICROgram(s)/kG/Min IV Continuous <Continuous>  nystatin Powder 1 Application(s) Topical three times a day  pantoprazole   Suspension 40 milliGRAM(s) Oral daily  petrolatum Ophthalmic Ointment 1 Application(s) Both EYES three times a day  senna 2 Tablet(s) Oral at bedtime  sodium chloride 0.9% Bolus 500 milliLiter(s) IV Bolus once  zinc oxide 20% Ointment 1 Application(s) Topical three times a day    VITAL SIGNS:  Vital Signs Last 24 Hrs  T(C): 36.6 (21 May 2019 05:01), Max: 37.4 (20 May 2019 14:00)  T(F): 97.9 (21 May 2019 05:01), Max: 99.3 (20 May 2019 14:00)  HR: 62 (21 May 2019 07:00) (58 - 84)  BP: 152/73 (21 May 2019 06:05) (79/45 - 158/73)  BP(mean): 93 (21 May 2019 06:05) (54 - 100)  RR: 20 (21 May 2019 07:00) (12 - 45)  SpO2: 100% (21 May 2019 07:00) (98% - 100%)    PHYSICAL EXAMINATION:  Constitutional: Frail middle age female, intubated, NG tube in place  Eyes: Conjunctiva and sclera clear  Neurologic:  - Mental Status:  Awake and alert, able to follow commands as directed  - Cranial Nerves II-XII:    II: Pupils are equal, round, and reactive to light.  III, IV, VI:  EOMI, horizontal nystagmus with lateral gaze  V:  Facial sensation is intact  VII:  mildly weak eye closure R > L   VIII:  Hearing is intact to finger rub.  XI: Mildly improved shoulder shrug, able to move head  - Motor: Hypotrophic muscles. Upper extremities 4/5; RLE 3/5; LLE 2/5, Ankle 0/5 b/l  - Reflexes:  absent throughout   - Sensory:  Intact to light touch  - Gait: Deferred    LABS:                          7.3    8.11  )-----------( 348      ( 21 May 2019 06:08 )             23.9     05-21    140  |  101  |  19  ----------------------------<  94  4.4   |  26  |  0.46<L>    Ca    9.6      21 May 2019 05:22  Phos  4.5     05-21  Mg     2.1     05-21    TPro  6.2  /  Alb  3.4  /  TBili  0.5  /  DBili  x   /  AST  19  /  ALT  7<L>  /  AlkPhos  50  05-21    PT/INR - ( 21 May 2019 06:08 )   PT: 12.0 sec;   INR: 1.06          PTT - ( 21 May 2019 06:08 )  PTT:30.9 sec      RADIOLOGY & ADDITIONAL STUDIES:

## 2019-05-21 NOTE — ADVANCED PRACTICE NURSE CONSULT - RECOMMEDATIONS
Bilateral buttocks, intergluteal cleft and upper posterior thighs fungal rash - continue to apply nystatin powder as ordered.  Continue use of AirTAP positioning system with wedges to turn and reposition and heel protectors to offload heels.  Discussed assessment and recommendations with Dieudonne. Bilateral buttocks, intergluteal cleft and upper posterior thighs fungal rash - apply antifungal cream twice daily.  Continue use of AirTAP positioning system with wedges to turn and reposition and heel protectors to offload heels.  Discussed assessment and recommendations with Dieudonne and NP, [facility]. Nystatin powder discontinued.

## 2019-05-21 NOTE — PROGRESS NOTE ADULT - ASSESSMENT
58yo F PMHx polio, breast cancer, DM, Endometrial Cancer s/p exploratory laparotomy, enterolysis, SHAMIR, BSO, pelvic lymphadenectomy, low anterior resection mobilization of splenic flexure, end colostomy on 7/30/18, rectovaginal fistula, numerous admissions for urinary tract infection presented to [facility] in the setting of urosepsis. Hospital course complicated by blood stream infection (currently on ertapenem) and respiratory failure requiring intubation s/p extubation on 5/2. Neurology consulted for worsening lower extremity weakness. On prior admission EMG raises suspicion for CIDP-spectrum disorder. Per collateral information patient became noticeably weak mala in her hands for approx 1 week prior to being diagnosed with IDP in April, suggesting a more acute process. 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 s/p four days of IVIG 500mg/kg on 5/7 - 5/10. Now receiving plasma exchange therapy. Now with improvement of LE strength.    Recommend  -Last Plex therapy today  -fibrinogen WNL  -patient now agreeing to trach and PEG  -Pending MR brain w/ and w/o con & MR cervical spine w/ and w/o con once stable   Neurology will follow 60yo F PMHx polio, breast cancer, DM, Endometrial Cancer s/p exploratory laparotomy, enterolysis, SHAMIR, BSO, pelvic lymphadenectomy, low anterior resection mobilization of splenic flexure, end colostomy on 7/30/18, rectovaginal fistula, numerous admissions for urinary tract infection presented to [facility] in the setting of urosepsis. Hospital course complicated by blood stream infection (currently on ertapenem) and respiratory failure requiring intubation s/p extubation on 5/2. Neurology consulted for worsening lower extremity weakness. On prior admission EMG raises suspicion for CIDP-spectrum disorder. Per collateral information patient became noticeably weak mala in her hands for approx 1 week prior to being diagnosed with IDP in April, suggesting a more acute process. 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 s/p four days of IVIG 500mg/kg on 5/7 - 5/10. Now receiving plasma exchange therapy. Now with improvement of LE strength.    Recommend  -Last Plex therapy today  -fibrinogen WNL  -patient now agreeing to trach and PEG  Neurology will follow 60yo F PMHx polio, breast cancer, DM, Endometrial Cancer s/p exploratory laparotomy, enterolysis, SHAMIR, BSO, pelvic lymphadenectomy, low anterior resection mobilization of splenic flexure, end colostomy on 7/30/18, rectovaginal fistula, numerous admissions for urinary tract infection presented to [facility] in the setting of urosepsis. Hospital course complicated by blood stream infection (currently on ertapenem) and respiratory failure requiring intubation s/p extubation on 5/2. Neurology consulted for worsening lower extremity weakness. On prior admission EMG raises suspicion for CIDP-spectrum disorder. Per collateral information patient became noticeably weak mala in her hands for approx 1 week prior to being diagnosed with IDP in April, suggesting a more acute process. 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 s/p four days of IVIG 500mg/kg on 5/7 - 5/10. Now receiving plasma exchange therapy. Now with improvement of LE strength.    Recommend  -Last Plex therapy today  -fibrinogen WNL  -patient now agreeing to trach and PEG  Neurology will follow

## 2019-05-21 NOTE — PROGRESS NOTE ADULT - ASSESSMENT
58 yo F PMHx polio, breast cancer, DM, Endometrial Cancer s/p exploratory laparotomy, enterolysis, SHAMIR, BSO, pelvic lymphadenectomy, low anterior resection mobilization of splenic flexure, end colostomy on 7/30/18, rectovaginal fistula, numerous admissions for urinary tract infection presented to [facility] in the setting of urosepsis. Hospital course complicated by blood stream infection (currently on ertapenem) and respiratory failure requiring intubation s/p extubation on 5/2. Intubated urgently on 7 lachman for CO2 narcosis in setting of AIDP and stepped up to MICU, initially improving s/p IVIG and extubated to bipap on 5/10, reintubated for CO2 narcosis on 5/14 and started on plasmapheresis     CARDIOVASCULAR  #Hemodynamics  Stable  - making appropriate amounts of urine, mentating well  - Still on levophed, with increased requirements. Continue midodrine 15mg TID. Will attempt to down titrate levophed to MAP above 65    PULMONARY  #Acute hypercapnic respiratory failure   2/2 to demyelinating polyneuropathy (AIDP), patient extubated to bipap on 5/10, reintubated on 5/14 for CO2 narcosis  - s/p 4 doses of IVIG finished 5/11 and started on plasmapharesis on 5/13. Completed 4 rounds. 5th round tomorrow ( 5 total)  - appreciate further neurology recs  - transfusion medicine following. Appreciate further recs   - NIF's and VC daily (recent NIFs have been unreliable due to vent- will start getting manual NIF's)       #Hx of urinary retention  - [place] changed on 5/17     Neurology  #AIDP  Patient w AIDP leading to respiratory compromise, previous EMG w demyelinating polyneuropathy  - recs per neurology  - s/p 4 rounds of IVIG, with dramatic improvement in strength and respiratory status, however decompensated on 5/13 and reintubated  - f/u MRI brain w/wo contrast and MR cervical spine w/wo contrast when patient stable to go for imaging  - plasmapheresis as above under pulmonary ( Last round tomorrow)    INFECTIOUS DISEASE  #hx of recurrent ESBL ecoli UTI and bacteremia  - previously on admission patient admitted to MICU in septic shock 2/2 to ESBL ecoli bacteremia and UTI  - Completed tx w/ ertapenem on 5/14   - Repeat UCx w/ yeast     #Fever  - Pt spiked fever on 5/17. UA positive but UCx growing <4000 yeast  - CXR negative w/ no new infiltrates  - Fever most likely from left cephalic v. thrombus which is being treated   - Since pt otherwise clinically stable w/ low suspicion for underlying infection, will hold off on abx   -  BCx 5/17 NGTD. Repeat BCx drawn 5/19    HEME/ONC  # Anemia   - Hb 7.5 today; s/p 1u pRBC's 5/18  - If Hb continues to drop, will need CT abdomen/ pelvis to r/o RP bleed since pt is on Lovenox   - Maintain active T&S    #Stage IV endometrial adenocarcinoma  Per GYN-ONC w/ interval increase in tumor burden. Pt was treated with Anastrazole and initiated 3 weeks of megace 80mg BID followed by 3 weeks of tamoxifen.   - Pt was on tamoxifen and developed a left cephalic v. thrombus. Tamoxifen was dc'd on 5/17   - GYN-ONC following, appreciate further recs     #Cephalic vein thrombosis  - As above  - Continue w/ Lovenox 60mg BID (5/17-)    FLUIDS/ELECTROLYTES/NUTRITION  -IVF: none  -Monitor, Replete to K>4 and Mg>2  -Diet: continue NGT feeds for now    PROPHYLAXIS  -DVT: Lovenox and SCDs  -GI: none as on feeds    DISPO: MICU 58 yo F PMHx polio, breast cancer, DM, Endometrial Cancer s/p exploratory laparotomy, enterolysis, SHAMIR, BSO, pelvic lymphadenectomy, low anterior resection mobilization of splenic flexure, end colostomy on 7/30/18, rectovaginal fistula, numerous admissions for urinary tract infection presented to [facility] in the setting of urosepsis. Hospital course complicated by blood stream infection (currently on ertapenem) and respiratory failure requiring intubation s/p extubation on 5/2. Intubated urgently on 7 lachman for CO2 narcosis in setting of AIDP and stepped up to MICU, initially improving s/p IVIG and extubated to bipap on 5/10, reintubated for CO2 narcosis on 5/14 and started on plasmapheresis     CARDIOVASCULAR  #Hemodynamics  Stable  - making appropriate amounts of urine, mentating well  - Still on levophed, with increased requirements. Continue midodrine 15mg TID. Will attempt to down titrate levophed to MAP above 65    PULMONARY  #Acute hypercapnic respiratory failure   2/2 to demyelinating polyneuropathy (AIDP), patient extubated to bipap on 5/10, reintubated on 5/13 for CO2 narcosis  - s/p 4 doses of IVIG finished 5/11 and started on plasmapharesis on 5/13. Completed 4 rounds already. 5th round today ( 5 total)  - Pt has been tolerating CPAP trials well. Given her hx of multiple intubations in the past given her AIDP, pt made herself DNR/DNI. She does want a tracheostomy done   - Plan for trach today in the afternoon       #Hx of urinary retention  - [place] changed on 5/17     GI  # PEG placement  - Pt to get PEG placed tomorrow w/ GI  - NPO @midnight for PEG     Neurology  #AIDP  Patient w AIDP leading to respiratory compromise, previous EMG w demyelinating polyneuropathy  - recs per neurology  - s/p 4 rounds of IVIG, with dramatic improvement in strength and respiratory status, however decompensated on 5/13 and reintubated  - Will complete 5 rounds of plasmapheresis tonight. Overall, pt's strength is much improved and she appears stronger   - f/u MRI brain w/wo contrast and MR cervical spine w/wo contrast     INFECTIOUS DISEASE  #hx of recurrent ESBL ecoli UTI and bacteremia  - previously on admission patient admitted to MICU in septic shock 2/2 to ESBL ecoli bacteremia and UTI  - Completed tx w/ ertapenem on 5/14   - Repeat UCx w/ yeast     #Fever  - Pt spiked fever on 5/17. UA positive but UCx growing <4000 yeast  - CXR negative w/ no new infiltrates  - Fever most likely from left cephalic v. thrombus which is being treated   - Since pt otherwise clinically stable w/ low suspicion for underlying infection, will hold off on abx   -  BCx 5/17 NGTD. Repeat BCx 5/19 NGTD    HEME/ONC  # Anemia   - Hb 7.3 today; s/p 1u pRBC's 5/18  - If Hb continues to drop, will need CT abdomen/ pelvis to r/o RP bleed since pt is on Lovenox   - Maintain active T&S    #Stage IV endometrial adenocarcinoma  Per GYN-ONC w/ interval increase in tumor burden. Pt was treated with Anastrazole and initiated 3 weeks of megace 80mg BID followed by 3 weeks of tamoxifen.   - Pt was on tamoxifen and developed a left cephalic v. thrombus. Tamoxifen was dc'd on 5/17   - GYN-ONC following, appreciate further recs     #Cephalic vein thrombosis  - As above  - Pt on w/ Lovenox 60mg BID (5/17-). HOLD FOR TRACH TODAY and PEG tomorrow     FLUIDS/ELECTROLYTES/NUTRITION  -IVF: none  -Monitor, Replete to K>4 and Mg>2  -Diet: NPO for trach    PROPHYLAXIS  -DVT: Hold Lovenox and continue SCDs  -GI: none as on feeds    DISPO: MICU

## 2019-05-21 NOTE — CHART NOTE - NSCHARTNOTEFT_GEN_A_CORE
Pre-Bronchoscopy Tuberculosis Risk Screening Tool  To reduce the risk for airborne transmission of Mycobacterium tuberculosis, this assessment form must be completed prior to bronchoscopy procedures being performed outside of a negative pressure environment.    Procedure Date: __05/21/2019____  Health Care Provider Name: __Dr. Patsy Hoff ____  Form Completed By: __Chu Jarquin___  Reason for the Bronchoscopy: __Tracheostomy______  Planned Location for the Procedure:  [ ] Emergency Department     [X] Intensive Care Unit     [ ] Operating Room     Other: ___________________    Risk Assessment  I. I have personally evaluated this patient for Mycobacterium tuberculosis and I determined the following risk:  [X] No Risk for TB     [ ] Low risk or TB     [ ] Significant risk for TB    II. Additional Findings: _________________________    III. Based on the Determined Risk for TB the following Action(s) are Suggested:  1. If there are no risk factors for TB infection proceed with the procedure.  2. If there is low risk or significant risk for TB infection the following recommendations should be followed:            a. Perform the procedure in a negative pressure room, with N95 respirator.            b. If not feasible to move the patient or defer the procedure:                    i. Use a single-bedded room low traffic area to perform the bronchoscopy procedure.                   ii. Place a portable high-efficiency particulate air (HEPA) filter in the space prior to starting the procedure and keep closed.                       Refer to the INF.1132 titled “Tuberculosis Control Strategy Plan” for additional information.                  iii. All Health Care Providers within the procedure room shall wear an N95 respirator.            c. Documentation of the tuberculosis risk assessment should be included within the patient’s medical record.

## 2019-05-21 NOTE — CONSULT NOTE ADULT - ASSESSMENT
58yo F PMHx polio, breast cancer, DM, Endometrial Cancer s/p exploratory laparotomy, enterolysis, SHAMIR, BSO, pelvic lymphadenectomy, low anterior resection mobilization of splenic flexure, end colostomy on 7/30/18, rectovaginal fistula,, acute inflammatory demyelinating polyneuropathy (AIDP) given w/ respiratory distress and facial muscle weakness, requiring intubation.  GI consulted for dysphagia  1. Dysphagia - 2/2 AIDP  -plan for PEG  -pt and family in agreement  -will plan for PEG tomorrow  -NPO at midnight

## 2019-05-21 NOTE — ADVANCED PRACTICE NURSE CONSULT - REASON FOR CONSULT
[facility] nurse consult for 60 yo G0 w/ known stage IV endometrial carcinoma s/p staging surgery 7/2018 and 1 cycle of chemotherapy in 2/2019 followed by multiple admissions for management of symptomatic enterovaginal fistula, complex fistula associated urinary tract infection, bacteremia presents on referral from [facility] after fever of 100.7.

## 2019-05-21 NOTE — CHART NOTE - NSCHARTNOTEFT_GEN_A_CORE
Indication: Percutaneous Tracheostomy placement    Preop medication: Nimbex, Propofol, Fentanyl, Versed     Findings:  Bronchoscope inserted through ETT. Airway evaluation revealed [place]. JONNY and LLL evaluated with scatted thick purulent appearing secretions. Therapeutic aspiration performed. RUL, RML, RLL evaluated with scant thick secretions. Mucosa appeared WNL throughout the bronchial tree.     Under bronchoscopic visualization, percutaneous tracheostomy performed. Finder needle, followed by dilator seen entering trachea without puncturing posterior wall. Once tracheostomy placed, bronchoscope passed through tracheostomy and confirmed proper placement, minimal bleeding seen.     Specimens: Bronchial Wash    EBL: Minimal Procedure : Bedside Bronchoscopy with bronchial wash    Indication: Percutaneous Tracheostomy placement    Preop medication: Nimbex, Propofol, Fentanyl, Versed     Findings:  Bronchoscope inserted through ETT. Airway evaluation revealed Sharp [place]. JONNY and LLL evaluated with scatted thick purulent appearing secretions. Therapeutic aspiration performed. RUL, RML, RLL evaluated with scant thick secretions. Mucosa appeared WNL throughout the bronchial tree.     Under bronchoscopic visualization, percutaneous tracheostomy performed. Finder needle, followed by dilator seen entering trachea without puncturing posterior wall. Once tracheostomy placed, bronchoscope passed through tracheostomy and confirmed proper placement, minimal bleeding seen.     Specimens: Bronchial Wash    EBL: Minimal

## 2019-05-21 NOTE — ADVANCED PRACTICE NURSE CONSULT - ASSESSMENT
Generalized fungal rash and denuded skin noted to bilateral buttocks, intergluteal cleft and upper posterior thighs. Fungal rash being treated with nystatin powder as ordered. RNDieudonne present and assisted during assessment. Patient requires 1-2 person assist to turn and position in bed. Patient on an AirTAP positioning system with wedges for turning and repositioning and wearing heel protectors to offload heels.

## 2019-05-21 NOTE — PROGRESS NOTE ADULT - ASSESSMENT
58yo F HD23 with stage IV endometrial adenocarcinoma s/p staging surgery '18 and 1 round of chemotherapy 2/19 readmitted from [facility] with fever, previously admitted for management of symptomatic rectovaginal and enterovaginal fistulas. Presents on referral from [facility] with urosepsis. Rapid response called due to worsening respiratory status s/p intubation and MICU admission. She then was on regional GYN service however had respiratory weakness and was transferred to Medicine/tele HD9 until requiring intubation again HD10 and is now in MICU. 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 She was re-intubated on 5/13 due to respiratory distress. Pt has been on plasmapheresis- receiving last tx today. She was made DNR/DNI 5/14 and on 5/17 found to have upper extremity DVT.   Management per MICU.    1. Neuro: Neurology following- weakness secondary Guillain [place] versus exacerbation of AIDP- now status post IVIG x4 day course, Patient has likely failed IVIG treatment given current status, now undergoing plasmapheresis, receiving last tx today  On propofol   2. Pulm: Currently intubated, was Extubated 5/10-transitioned to BiPAP initially, then on nasal canula 4L. re-intubated 5/13 due to respiratory distress,  now DNR/DNI.   Plan for tracheostomy today   3. Cardio: On norepinephrine   4. FEN/GI: Dobhoff in situ, on tube feeds. Plan for PEG placement 5/22. Repletion of electrolytes prn  5. : Adequate urine output, Joseph in place. f/u Last urine culture 5/18: 4,000 yeast (final)   - Joseph placed 5/8 after finding of overflow incontinence, replaced joseph 5/18  f/u urology recs regarding suprapubic catheter   6. ID: Currently afebrile. Tlast 101.3 at 1130am on 5/17, BCx's NGTD  -s/p ertapenem ended 5/14, s/p meropenem, s/p vanc, appreciate ID recs   7. Endocrine: FS, ISS, Lantus 15u qhs (started 5/14), Humulin 4u q6hr, Metformin 1000mg BID held at this time   8. VTE prophylaxis - SCDs, Lovenox 60mg BID given upper extremity DVT noted on 5/17   9. GYN malignancy  - Stopped anastrazole and initiated 3 weeks of megace 80mg BID followed by 3 weeks of tamoxifen.   - Started megace and metformin 4/10, was on Tamoxifen for 21 days (5/1/19-  5/17)- stopped given upper extremity DVT , once patient recovers from acute neurologic issue will reassess cancer treatment options   -  Avoid immunotherapy at this time given associated risk of autoimmune disease   10. Derm: monitor sacrum for s/s of pressure ulcer, now stage 2- continue with zinc oxide   11. Follow up PT/OT recs   12. Social work/palliative: DNR/DNI. Consider psych consult for depression

## 2019-05-21 NOTE — PROGRESS NOTE ADULT - SUBJECTIVE AND OBJECTIVE BOX
Pt seen and examined at bedside. Pt points at ET tube and wants it out.    T(F): 100.1 (05-21-19 @ 09:12), Max: 100.1 (05-21-19 @ 09:12)  HR: 76 (05-21-19 @ 12:24) (58 - 84)  BP: 153/59 (05-21-19 @ 12:24) (74/45 - 166/72)  RR: 37 (05-21-19 @ 12:24) (12 - 37)  SpO2: 100% (05-21-19 @ 12:24) (98% - 100%)  Wt(kg): --  I&O's Summary    20 May 2019 07:01  -  21 May 2019 07:00  --------------------------------------------------------  IN: 760 mL / OUT: 880 mL / NET: -120 mL    21 May 2019 07:01  -  21 May 2019 12:58  --------------------------------------------------------  IN: 58 mL / OUT: 325 mL / NET: -267 mL    MEDICATIONS  (STANDING):  albumin human  5% IVPB 2750 milliLiter(s) IV Intermittent once  ascorbic acid 500 milliGRAM(s) Oral daily  calcium gluconate IVPB 1 Gram(s) IV Intermittent once  chlorhexidine 0.12% Liquid 15 milliLiter(s) Oral Mucosa every 12 hours  chlorhexidine 2% Cloths 1 Application(s) Topical <User Schedule>  cisatracurium Injectable 12 milliGRAM(s) IV Push once  dextrose 5%. 1000 milliLiter(s) (50 mL/Hr) IV Continuous <Continuous>  dextrose 50% Injectable 25 Gram(s) IV Push once  docusate sodium Liquid 100 milliGRAM(s) Oral daily  fentaNYL   Infusion. 0.5 MICROgram(s)/kG/Hr (2.915 mL/Hr) IV Continuous <Continuous>  Heparin 1000 Units / ml 2 milliLiter(s) 2 milliLiter(s) IV Push once  insulin regular  human corrective regimen sliding scale   SubCutaneous every 6 hours  midodrine 15 milliGRAM(s) Oral every 8 hours  norepinephrine Infusion 0.05 MICROgram(s)/kG/Min (5.466 mL/Hr) IV Continuous <Continuous>  nystatin Cream 1 Application(s) Topical two times a day  pantoprazole   Suspension 40 milliGRAM(s) Oral daily  petrolatum Ophthalmic Ointment 1 Application(s) Both EYES three times a day  propofol Infusion 20 MICROgram(s)/kG/Min (6.996 mL/Hr) IV Continuous <Continuous>  senna 2 Tablet(s) Oral at bedtime  zinc oxide 20% Ointment 1 Application(s) Topical three times a day    MEDICATIONS  (PRN):  dextrose 40% Gel 15 Gram(s) Oral once PRN Blood Glucose LESS THAN 70 milliGRAM(s)/deciliter  glucagon  Injectable 1 milliGRAM(s) IntraMuscular once PRN Glucose LESS THAN 70 milligrams/deciliter    Physical Exam:  Constitutional: NAD  Pulmonary: Intubated   Cardiovascular: Regular rate and rhythm   Abdomen: Soft, nontender, nondistended, no guarding, no rebound. ostomy in place with stool output   Extremities: no lower extremity edema or calve tenderness. SCDs in place     LABS:                        7.3    8.11  )-----------( 348      ( 21 May 2019 06:08 )             23.9     05-21    140  |  101  |  19  ----------------------------<  94  4.4   |  26  |  0.46<L>    Ca    9.6      21 May 2019 05:22  Phos  4.5     05-21  Mg     2.1     05-21    TPro  6.2  /  Alb  3.4  /  TBili  0.5  /  DBili  x   /  AST  19  /  ALT  7<L>  /  AlkPhos  50  05-21    PT/INR - ( 21 May 2019 06:08 )   PT: 12.0 sec;   INR: 1.06          PTT - ( 21 May 2019 06:08 )  PTT:30.9 sec      RADIOLOGY & ADDITIONAL TESTS:

## 2019-05-22 DIAGNOSIS — R50.9 FEVER, UNSPECIFIED: ICD-10-CM

## 2019-05-22 LAB
ALBUMIN SERPL ELPH-MCNC: 4.6 G/DL — SIGNIFICANT CHANGE UP (ref 3.3–5)
ALP SERPL-CCNC: 26 U/L — LOW (ref 40–120)
ALT FLD-CCNC: <5 U/L — LOW (ref 10–45)
ANION GAP SERPL CALC-SCNC: 15 MMOL/L — SIGNIFICANT CHANGE UP (ref 5–17)
APTT BLD: 28.9 SEC — SIGNIFICANT CHANGE UP (ref 27.5–36.3)
AST SERPL-CCNC: 12 U/L — SIGNIFICANT CHANGE UP (ref 10–40)
BASOPHILS # BLD AUTO: 0.03 K/UL — SIGNIFICANT CHANGE UP (ref 0–0.2)
BASOPHILS NFR BLD AUTO: 0.1 % — SIGNIFICANT CHANGE UP (ref 0–2)
BILIRUB SERPL-MCNC: 1 MG/DL — SIGNIFICANT CHANGE UP (ref 0.2–1.2)
BUN SERPL-MCNC: 15 MG/DL — SIGNIFICANT CHANGE UP (ref 7–23)
CALCIUM SERPL-MCNC: 8.8 MG/DL — SIGNIFICANT CHANGE UP (ref 8.4–10.5)
CHLORIDE SERPL-SCNC: 100 MMOL/L — SIGNIFICANT CHANGE UP (ref 96–108)
CO2 SERPL-SCNC: 26 MMOL/L — SIGNIFICANT CHANGE UP (ref 22–31)
CORTIS AM PEAK SERPL-MCNC: 10.6 UG/DL — SIGNIFICANT CHANGE UP (ref 3.9–37.5)
CORTIS AM PEAK SERPL-MCNC: 18.5 UG/DL — SIGNIFICANT CHANGE UP (ref 3.9–37.5)
CORTIS AM PEAK SERPL-MCNC: 20.1 UG/DL — SIGNIFICANT CHANGE UP (ref 3.9–37.5)
CORTIS AM PEAK SERPL-MCNC: 23.6 UG/DL — SIGNIFICANT CHANGE UP (ref 3.9–37.5)
CREAT SERPL-MCNC: 0.54 MG/DL — SIGNIFICANT CHANGE UP (ref 0.5–1.3)
CULTURE RESULTS: NO GROWTH — SIGNIFICANT CHANGE UP
CULTURE RESULTS: SIGNIFICANT CHANGE UP
CULTURE RESULTS: SIGNIFICANT CHANGE UP
EOSINOPHIL # BLD AUTO: 0.02 K/UL — SIGNIFICANT CHANGE UP (ref 0–0.5)
EOSINOPHIL NFR BLD AUTO: 0.1 % — SIGNIFICANT CHANGE UP (ref 0–6)
FIBRINOGEN PPP-MCNC: 185 MG/DL — LOW (ref 258–438)
GLUCOSE BLDC GLUCOMTR-MCNC: 141 MG/DL — HIGH (ref 70–99)
GLUCOSE BLDC GLUCOMTR-MCNC: 183 MG/DL — HIGH (ref 70–99)
GLUCOSE BLDC GLUCOMTR-MCNC: 200 MG/DL — HIGH (ref 70–99)
GLUCOSE SERPL-MCNC: 138 MG/DL — HIGH (ref 70–99)
GRAM STN FLD: SIGNIFICANT CHANGE UP
GRAM STN FLD: SIGNIFICANT CHANGE UP
HCT VFR BLD CALC: 25.9 % — LOW (ref 34.5–45)
HGB BLD-MCNC: 8 G/DL — LOW (ref 11.5–15.5)
IMM GRANULOCYTES NFR BLD AUTO: 0.7 % — SIGNIFICANT CHANGE UP (ref 0–1.5)
INR BLD: 1.28 — HIGH (ref 0.88–1.16)
LACTATE SERPL-SCNC: 0.8 MMOL/L — SIGNIFICANT CHANGE UP (ref 0.5–2)
LYMPHOCYTES # BLD AUTO: 10.1 % — LOW (ref 13–44)
LYMPHOCYTES # BLD AUTO: 2.04 K/UL — SIGNIFICANT CHANGE UP (ref 1–3.3)
MAGNESIUM SERPL-MCNC: 1.6 MG/DL — SIGNIFICANT CHANGE UP (ref 1.6–2.6)
MCHC RBC-ENTMCNC: 28.6 PG — SIGNIFICANT CHANGE UP (ref 27–34)
MCHC RBC-ENTMCNC: 30.9 GM/DL — LOW (ref 32–36)
MCV RBC AUTO: 92.5 FL — SIGNIFICANT CHANGE UP (ref 80–100)
MONOCYTES # BLD AUTO: 1.45 K/UL — HIGH (ref 0–0.9)
MONOCYTES NFR BLD AUTO: 7.2 % — SIGNIFICANT CHANGE UP (ref 2–14)
NEUTROPHILS # BLD AUTO: 16.52 K/UL — HIGH (ref 1.8–7.4)
NEUTROPHILS NFR BLD AUTO: 81.8 % — HIGH (ref 43–77)
NIGHT BLUE STAIN TISS: SIGNIFICANT CHANGE UP
NRBC # BLD: 0 /100 WBCS — SIGNIFICANT CHANGE UP (ref 0–0)
PHOSPHATE SERPL-MCNC: 4.4 MG/DL — SIGNIFICANT CHANGE UP (ref 2.5–4.5)
PLATELET # BLD AUTO: 409 K/UL — HIGH (ref 150–400)
POTASSIUM SERPL-MCNC: 3.9 MMOL/L — SIGNIFICANT CHANGE UP (ref 3.5–5.3)
POTASSIUM SERPL-SCNC: 3.9 MMOL/L — SIGNIFICANT CHANGE UP (ref 3.5–5.3)
PROT SERPL-MCNC: 5.9 G/DL — LOW (ref 6–8.3)
PROTHROM AB SERPL-ACNC: 14.6 SEC — HIGH (ref 10–12.9)
RBC # BLD: 2.8 M/UL — LOW (ref 3.8–5.2)
RBC # FLD: 16.1 % — HIGH (ref 10.3–14.5)
SODIUM SERPL-SCNC: 141 MMOL/L — SIGNIFICANT CHANGE UP (ref 135–145)
SPECIMEN SOURCE: SIGNIFICANT CHANGE UP
VANCOMYCIN TROUGH SERPL-MCNC: 47.9 UG/ML — CRITICAL HIGH (ref 10–20)
WBC # BLD: 20.66 K/UL — HIGH (ref 3.8–10.5)
WBC # FLD AUTO: 20.66 K/UL — HIGH (ref 3.8–10.5)

## 2019-05-22 PROCEDURE — 99233 SBSQ HOSP IP/OBS HIGH 50: CPT

## 2019-05-22 PROCEDURE — 99231 SBSQ HOSP IP/OBS SF/LOW 25: CPT

## 2019-05-22 PROCEDURE — 71045 X-RAY EXAM CHEST 1 VIEW: CPT | Mod: 26

## 2019-05-22 PROCEDURE — 99291 CRITICAL CARE FIRST HOUR: CPT

## 2019-05-22 RX ORDER — MAGNESIUM SULFATE 500 MG/ML
2 VIAL (ML) INJECTION ONCE
Refills: 0 | Status: COMPLETED | OUTPATIENT
Start: 2019-05-22 | End: 2019-05-22

## 2019-05-22 RX ORDER — VANCOMYCIN HCL 1 G
1000 VIAL (EA) INTRAVENOUS EVERY 12 HOURS
Refills: 0 | Status: DISCONTINUED | OUTPATIENT
Start: 2019-05-22 | End: 2019-05-23

## 2019-05-22 RX ORDER — FENTANYL CITRATE 50 UG/ML
25 INJECTION INTRAVENOUS ONCE
Refills: 0 | Status: DISCONTINUED | OUTPATIENT
Start: 2019-05-22 | End: 2019-05-22

## 2019-05-22 RX ORDER — POTASSIUM CHLORIDE 20 MEQ
20 PACKET (EA) ORAL ONCE
Refills: 0 | Status: COMPLETED | OUTPATIENT
Start: 2019-05-22 | End: 2019-05-22

## 2019-05-22 RX ORDER — ACETAMINOPHEN 500 MG
650 TABLET ORAL ONCE
Refills: 0 | Status: COMPLETED | OUTPATIENT
Start: 2019-05-22 | End: 2019-05-22

## 2019-05-22 RX ORDER — LIDOCAINE HCL 20 MG/ML
5 VIAL (ML) INJECTION ONCE
Refills: 0 | Status: DISCONTINUED | OUTPATIENT
Start: 2019-05-22 | End: 2019-05-22

## 2019-05-22 RX ORDER — MEROPENEM 1 G/30ML
1000 INJECTION INTRAVENOUS EVERY 8 HOURS
Refills: 0 | Status: COMPLETED | OUTPATIENT
Start: 2019-05-22 | End: 2019-05-22

## 2019-05-22 RX ORDER — COSYNTROPIN 0.25 MG/ML
0.25 INJECTION, SOLUTION INTRAVENOUS ONCE
Refills: 0 | Status: COMPLETED | OUTPATIENT
Start: 2019-05-22 | End: 2019-05-22

## 2019-05-22 RX ORDER — ENOXAPARIN SODIUM 100 MG/ML
40 INJECTION SUBCUTANEOUS EVERY 24 HOURS
Refills: 0 | Status: DISCONTINUED | OUTPATIENT
Start: 2019-05-22 | End: 2019-06-02

## 2019-05-22 RX ORDER — MEROPENEM 1 G/30ML
1000 INJECTION INTRAVENOUS EVERY 8 HOURS
Refills: 0 | Status: DISCONTINUED | OUTPATIENT
Start: 2019-05-22 | End: 2019-05-23

## 2019-05-22 RX ADMIN — INSULIN HUMAN 2: 100 INJECTION, SOLUTION SUBCUTANEOUS at 17:32

## 2019-05-22 RX ADMIN — Medication 650 MILLIGRAM(S): at 23:29

## 2019-05-22 RX ADMIN — ZINC OXIDE 1 APPLICATION(S): 200 OINTMENT TOPICAL at 05:35

## 2019-05-22 RX ADMIN — MIDODRINE HYDROCHLORIDE 15 MILLIGRAM(S): 2.5 TABLET ORAL at 23:17

## 2019-05-22 RX ADMIN — MIDODRINE HYDROCHLORIDE 15 MILLIGRAM(S): 2.5 TABLET ORAL at 05:34

## 2019-05-22 RX ADMIN — NYSTATIN CREAM 1 APPLICATION(S): 100000 CREAM TOPICAL at 05:34

## 2019-05-22 RX ADMIN — Medication 250 MILLIGRAM(S): at 17:31

## 2019-05-22 RX ADMIN — Medication 50 GRAM(S): at 07:54

## 2019-05-22 RX ADMIN — MEROPENEM 100 MILLIGRAM(S): 1 INJECTION INTRAVENOUS at 23:09

## 2019-05-22 RX ADMIN — Medication 500 MILLIGRAM(S): at 11:25

## 2019-05-22 RX ADMIN — Medication 250 MILLIGRAM(S): at 08:37

## 2019-05-22 RX ADMIN — MIDODRINE HYDROCHLORIDE 15 MILLIGRAM(S): 2.5 TABLET ORAL at 15:12

## 2019-05-22 RX ADMIN — MEROPENEM 100 MILLIGRAM(S): 1 INJECTION INTRAVENOUS at 08:36

## 2019-05-22 RX ADMIN — ZINC OXIDE 1 APPLICATION(S): 200 OINTMENT TOPICAL at 14:44

## 2019-05-22 RX ADMIN — INSULIN HUMAN 2: 100 INJECTION, SOLUTION SUBCUTANEOUS at 11:24

## 2019-05-22 RX ADMIN — Medication 650 MILLIGRAM(S): at 08:06

## 2019-05-22 RX ADMIN — Medication 5.47 MICROGRAM(S)/KG/MIN: at 19:44

## 2019-05-22 RX ADMIN — NYSTATIN CREAM 1 APPLICATION(S): 100000 CREAM TOPICAL at 17:32

## 2019-05-22 RX ADMIN — ENOXAPARIN SODIUM 40 MILLIGRAM(S): 100 INJECTION SUBCUTANEOUS at 23:08

## 2019-05-22 RX ADMIN — Medication 100 MILLIGRAM(S): at 11:24

## 2019-05-22 RX ADMIN — COSYNTROPIN 0.25 MILLIGRAM(S): 0.25 INJECTION, SOLUTION INTRAVENOUS at 11:24

## 2019-05-22 RX ADMIN — CHLORHEXIDINE GLUCONATE 15 MILLILITER(S): 213 SOLUTION TOPICAL at 23:09

## 2019-05-22 RX ADMIN — CHLORHEXIDINE GLUCONATE 1 APPLICATION(S): 213 SOLUTION TOPICAL at 05:34

## 2019-05-22 RX ADMIN — MEROPENEM 100 MILLIGRAM(S): 1 INJECTION INTRAVENOUS at 15:12

## 2019-05-22 RX ADMIN — FENTANYL CITRATE 25 MICROGRAM(S): 50 INJECTION INTRAVENOUS at 05:32

## 2019-05-22 RX ADMIN — SENNA PLUS 1 TABLET(S): 8.6 TABLET ORAL at 23:08

## 2019-05-22 RX ADMIN — FENTANYL CITRATE 25 MICROGRAM(S): 50 INJECTION INTRAVENOUS at 07:48

## 2019-05-22 RX ADMIN — CHLORHEXIDINE GLUCONATE 15 MILLILITER(S): 213 SOLUTION TOPICAL at 08:37

## 2019-05-22 RX ADMIN — Medication 20 MILLIEQUIVALENT(S): at 08:37

## 2019-05-22 RX ADMIN — PANTOPRAZOLE SODIUM 40 MILLIGRAM(S): 20 TABLET, DELAYED RELEASE ORAL at 11:25

## 2019-05-22 NOTE — CHART NOTE - NSCHARTNOTEFT_GEN_A_CORE
Infectious Diseases Anti-infective Approval Note    Medication:  Meropenem  Dose:  1 gram  Route:  IV  Frequency:  q8hrs  Duration:  7 days     Dose may be adjusted as needed for alterations in renal function.    *THIS IS NOT AN INFECTIOUS DISEASES CONSULTATION*

## 2019-05-22 NOTE — PROGRESS NOTE ADULT - ASSESSMENT
58yo F PMHx polio, breast cancer, DM, Endometrial Cancer s/p exploratory laparotomy, enterolysis, SHAMIR, BSO, pelvic lymphadenectomy, low anterior resection mobilization of splenic flexure, end colostomy on 7/30/18, rectovaginal fistula,, acute inflammatory demyelinating polyneuropathy (AIDP) given w/ respiratory distress and facial muscle weakness, requiring intubation.  GI consulted for dysphagia  1. Dysphagia - 2/2 AIDP  -s/p PEG  -can use for feeds this afternoon, after 24 hours from placement of PEG    Please notify GI with any further questions 60yo F PMHx polio, breast cancer, DM, Endometrial Cancer s/p exploratory laparotomy, enterolysis, SHAMIR, BSO, pelvic lymphadenectomy, low anterior resection mobilization of splenic flexure, end colostomy on 7/30/18, rectovaginal fistula,, acute inflammatory demyelinating polyneuropathy (AIDP) given w/ respiratory distress and facial muscle weakness, requiring intubation.  GI consulted for dysphagia    1. Dysphagia - 2/2 AIDP  -s/p PEG on 5/21 at 4pm  -can use for feeds this afternoon, after 24 hours from placement of PEG  -leukocytosis, fever overnight- 2/2 MRSA in sputum per primary team. PEG site appears clean     Please notify GI with any further questions. Thank you

## 2019-05-22 NOTE — PROGRESS NOTE ADULT - PROBLEM SELECTOR PLAN 10
Support provided to patient and family. Patient to have access to supportive services during rest of hospital stay as the patient/family deemed necessary ie. Chaplaincy, Massage therapy, Music therapy, Patient and family supportive services, Palliative SW, etc. as identified during the patients PSSA screening the patient would benefit from: continued visits from Patient and Family Support counselor and massage therapy.    Interdisciplinary meeting neurology, gyn, MICU and pall care planned for tomorrow

## 2019-05-22 NOTE — PROGRESS NOTE ADULT - ASSESSMENT
58yo F PMHx polio, breast cancer, DM, Endometrial Cancer s/p exploratory laparotomy, enterolysis, SHAMIR, BSO, pelvic lymphadenectomy, low anterior resection mobilization of splenic flexure, end colostomy on 7/30/18, rectovaginal fistula, numerous admissions for urinary tract infection presented to [facility] in the setting of urosepsis. Hospital course complicated by blood stream infection (currently on ertapenem) and respiratory failure requiring intubation s/p extubation on 5/2. Neurology consulted for worsening lower extremity weakness. On prior admission EMG raises suspicion for CIDP-spectrum disorder. Per collateral information patient became noticeably weak mala in her hands for approx 1 week prior to being diagnosed with IDP in April, suggesting a more acute process. 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 s/p four days of IVIG 500mg/kg on 5/7 - 5/10. Now receiving plasma exchange therapy. Now with improvement of LE strength.    Recommend  -s/p Plex therapy, considering long term treatment options  -will continue to monitor sx  -MRI H w/ small L posterior parietal stroke, MRI C-spine w/ Multilevel cervical spondylosis with spinal stenosis most pronounced at the C3/4 level  -Neurology will follow 58yo F PMHx polio, breast cancer, DM, Endometrial Cancer s/p exploratory laparotomy, enterolysis, SHAMIR, BSO, pelvic lymphadenectomy, low anterior resection mobilization of splenic flexure, end colostomy on 7/30/18, rectovaginal fistula, numerous admissions for urinary tract infection presented to [facility] in the setting of urosepsis. Hospital course complicated by blood stream infection (currently on ertapenem) and respiratory failure requiring intubation s/p extubation on 5/2. Neurology consulted for worsening lower extremity weakness. On prior admission EMG raises suspicion for CIDP-spectrum disorder. Per collateral information patient became noticeably weak mala in her hands for approx 1 week prior to being diagnosed with IDP in April, suggesting a more acute process. 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 s/p four days of IVIG 500mg/kg on 5/7 - 5/10. Now receiving plasma exchange therapy. Now with improvement of LE strength.    Recommend  -s/p Plex therapy, will continue to monitor sx and if neurological sx decline, will likely give IVIG  -MRI H w/ small L posterior parietal stroke, MRI C-spine w/ Multilevel cervical spondylosis with spinal stenosis most pronounced at the C3/4 level  -Neurology will follow, available for family meeting before noon or after 4pm tomorrow 58yo F PMHx polio, breast cancer, DM, Endometrial Cancer s/p exploratory laparotomy, enterolysis, SHAMIR, BSO, pelvic lymphadenectomy, low anterior resection mobilization of splenic flexure, end colostomy on 7/30/18, rectovaginal fistula, numerous admissions for urinary tract infection presented to [facility] in the setting of urosepsis. Hospital course complicated by blood stream infection (currently on ertapenem) and respiratory failure requiring intubation s/p extubation on 5/2. Neurology consulted for worsening lower extremity weakness. On prior admission EMG raises suspicion for CIDP-spectrum disorder. Per collateral information patient became noticeably weak mala in her hands for approx 1 week prior to being diagnosed with IDP in April, suggesting a more acute process. 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 s/p four days of IVIG 500mg/kg on 5/7 - 5/10. Now receiving plasma exchange therapy. Now with improvement of LE strength.    Recommend  -s/p Plex therapy, will continue to monitor sx and if neurological sx decline, will likely give IVIG every 3-4 weeks  -MRI H w/ small L posterior parietal stroke, MRI C-spine w/ Multilevel cervical spondylosis with spinal stenosis most pronounced at the C3/4 level  -Neurology will follow, available for family meeting before noon or after 4pm tomorrow

## 2019-05-22 NOTE — PROGRESS NOTE ADULT - ATTENDING COMMENTS
Limb strength is stable. However tidal volumes are still low. MRI C spine with cord compression at C3/4 which may be contributing to weakness and, potentially to some degree, respiratory insufficiency. MRI brain with chronic left parietal stroke, incidental.  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 However, her overall functional status is poor, especially with recurrent infections / sepsis requiring intubation, and as such may not be a candidate for chemotherapy.   Otherwise, IVIG can be given every 3-4 weeks (1gm/kg divided over 2 days) to keep immune-mediated / paraneoplastic neuropathy at bay; this would be lower risk than plasmapheresis or steroid treatment given her high risk for infection

## 2019-05-22 NOTE — PROGRESS NOTE ADULT - SUBJECTIVE AND OBJECTIVE BOX
INTERVAL HPI/OVERNIGHT EVENTS:    SUBJECTIVE: Patient seen and examined at bedside.     CONSTITUTIONAL: No weakness, fevers or chills  EYES/ENT: No visual changes;  No vertigo or throat pain   NECK: No pain or stiffness  RESPIRATORY: No cough, wheezing, hemoptysis; No shortness of breath  CARDIOVASCULAR: No chest pain or palpitations  GASTROINTESTINAL: No abdominal or epigastric pain. No nausea, vomiting, or hematemesis; No diarrhea or constipation. No melena or hematochezia.  GENITOURINARY: No dysuria, frequency or hematuria  NEUROLOGICAL: No numbness or weakness  SKIN: No itching, rashes    OBJECTIVE:    VITAL SIGNS:  ICU Vital Signs Last 24 Hrs  T(C): 37.6 (22 May 2019 05:20), Max: 38.7 (22 May 2019 05:00)  T(F): 99.6 (22 May 2019 05:20), Max: 101.6 (22 May 2019 05:00)  HR: 89 (22 May 2019 05:32) (50 - 92)  BP: 108/48 (22 May 2019 05:00) (74/45 - 193/74)  BP(mean): 70 (22 May 2019 05:00) (44 - 113)  ABP: --  ABP(mean): --  RR: 20 (22 May 2019 05:00) (14 - 39)  SpO2: 100% (22 May 2019 05:32) (99% - 100%)    Mode: AC/ CMV (Assist Control/ Continuous Mandatory Ventilation), RR (machine): 12, TV (machine): 320, FiO2: 40, PEEP: 5, ITime: 1, MAP: 8.7, PIP: 18    05-20 @ 07:01 - 05-21 @ 07:00  --------------------------------------------------------  IN: 760 mL / OUT: 880 mL / NET: -120 mL    05-21 @ 07:01  -  05-22 @ 06:39  --------------------------------------------------------  IN: 612.7 mL / OUT: 865 mL / NET: -252.3 mL      CAPILLARY BLOOD GLUCOSE      POCT Blood Glucose.: 141 mg/dL (22 May 2019 05:27)      PHYSICAL EXAM:    General: NAD  HEENT: NC/AT; PERRL, clear conjunctiva  Neck: supple  Respiratory: CTA b/l  Cardiovascular: +S1/S2; RRR  Abdomen: soft, NT/ND; +BS x4  Extremities: WWP, 2+ peripheral pulses b/l; no LE edema  Skin: normal color and turgor; no rash  Neurological:    MEDICATIONS:  MEDICATIONS  (STANDING):  albumin human  5% IVPB 2750 milliLiter(s) IV Intermittent once  ascorbic acid 500 milliGRAM(s) Oral daily  calcium gluconate IVPB 1 Gram(s) IV Intermittent once  chlorhexidine 0.12% Liquid 15 milliLiter(s) Oral Mucosa every 12 hours  chlorhexidine 2% Cloths 1 Application(s) Topical <User Schedule>  dextrose 5%. 1000 milliLiter(s) (50 mL/Hr) IV Continuous <Continuous>  dextrose 50% Injectable 25 Gram(s) IV Push once  docusate sodium Liquid 100 milliGRAM(s) Oral daily  Heparin 1000 Units / ml 2 milliLiter(s) 2 milliLiter(s) IV Push once  insulin regular  human corrective regimen sliding scale   SubCutaneous every 6 hours  magnesium sulfate  IVPB 2 Gram(s) IV Intermittent once  meropenem  IVPB 1000 milliGRAM(s) IV Intermittent every 8 hours  meropenem  IVPB 1000 milliGRAM(s) IV Intermittent every 8 hours  midodrine 15 milliGRAM(s) Oral every 8 hours  norepinephrine Infusion 0.05 MICROgram(s)/kG/Min (5.466 mL/Hr) IV Continuous <Continuous>  nystatin Cream 1 Application(s) Topical two times a day  pantoprazole   Suspension 40 milliGRAM(s) Oral daily  petrolatum Ophthalmic Ointment 1 Application(s) Both EYES three times a day  potassium chloride   Powder 20 milliEquivalent(s) Oral once  senna 2 Tablet(s) Oral at bedtime  vancomycin  IVPB 1000 milliGRAM(s) IV Intermittent every 12 hours  zinc oxide 20% Ointment 1 Application(s) Topical three times a day    MEDICATIONS  (PRN):  dextrose 40% Gel 15 Gram(s) Oral once PRN Blood Glucose LESS THAN 70 milliGRAM(s)/deciliter  glucagon  Injectable 1 milliGRAM(s) IntraMuscular once PRN Glucose LESS THAN 70 milligrams/deciliter      ALLERGIES:  Allergies    No Known Allergies    Intolerances    IVIG PRODUCT IS PRIGIVEN OR GAMMUNEX (Unknown)      LABS:                        8.0    20.66 )-----------( 409      ( 22 May 2019 04:56 )             25.9     05-22    141  |  100  |  15  ----------------------------<  138<H>  3.9   |  26  |  0.54    Ca    8.8      22 May 2019 04:57  Phos  4.4     05-22  Mg     1.6     05-22    TPro  5.9<L>  /  Alb  4.6  /  TBili  1.0  /  DBili  x   /  AST  12  /  ALT  <5<L>  /  AlkPhos  26<L>  05-22    PT/INR - ( 22 May 2019 04:57 )   PT: 14.6 sec;   INR: 1.28          PTT - ( 22 May 2019 04:57 )  PTT:28.9 sec      RADIOLOGY & ADDITIONAL TESTS: Reviewed. INTERVAL HPI/OVERNIGHT EVENTS: Spiked a temp of 101.6F. Given vanc and meropenem.     SUBJECTIVE: Patient seen and examined at bedside. Pt complaining of headache this morning. Noted to have a lot of secretions today.     CONSTITUTIONAL: No weakness, fevers or chills  EYES/ENT: No visual changes;  No vertigo or throat pain   NECK: No pain or stiffness  RESPIRATORY: No cough, wheezing, hemoptysis; No shortness of breath  CARDIOVASCULAR: No chest pain or palpitations  GASTROINTESTINAL: No abdominal or epigastric pain. No nausea, vomiting, or hematemesis; No diarrhea or constipation. No melena or hematochezia.  GENITOURINARY: No dysuria, frequency or hematuria  NEUROLOGICAL: No numbness or weakness  SKIN: No itching, rashes    OBJECTIVE:    VITAL SIGNS:  ICU Vital Signs Last 24 Hrs  T(C): 37.6 (22 May 2019 05:20), Max: 38.7 (22 May 2019 05:00)  T(F): 99.6 (22 May 2019 05:20), Max: 101.6 (22 May 2019 05:00)  HR: 89 (22 May 2019 05:32) (50 - 92)  BP: 108/48 (22 May 2019 05:00) (74/45 - 193/74)  BP(mean): 70 (22 May 2019 05:00) (44 - 113)  ABP: --  ABP(mean): --  RR: 20 (22 May 2019 05:00) (14 - 39)  SpO2: 100% (22 May 2019 05:32) (99% - 100%)    Mode: AC/ CMV (Assist Control/ Continuous Mandatory Ventilation), RR (machine): 12, TV (machine): 320, FiO2: 40, PEEP: 5, ITime: 1, MAP: 8.7, PIP: 18    05-20 @ 07:01  -  05-21 @ 07:00  --------------------------------------------------------  IN: 760 mL / OUT: 880 mL / NET: -120 mL    05-21 @ 07:01  -  05-22 @ 06:39  --------------------------------------------------------  IN: 612.7 mL / OUT: 865 mL / NET: -252.3 mL      CAPILLARY BLOOD GLUCOSE      POCT Blood Glucose.: 141 mg/dL (22 May 2019 05:27)      PHYSICAL EXAM:    General: NAD, comfortable, tolerating CPAP   HEENT: NCAT, PERRL, clear conjunctiva, no scleral icterus  Neck: supple, no JVD  Respiratory:  Rhonchi bilaterally upper> lower lung fields   Cardiovascular: RRR, normal S1S2, no M/R/G  Vascular: 1+ radial and DP pulses  Abdomen: soft, NT/ND, bowel sounds present, L colostomy w pink stoma, no palpable masses  Extremities: cool to touch, no clubbing, cyanosis, or edema  Skin: No rashes present  Neuro: Awake and alert, following all commands. 2/5 in LE b/l, 4/5 UE b/l  No focal deficits    MEDICATIONS:  MEDICATIONS  (STANDING):  albumin human  5% IVPB 2750 milliLiter(s) IV Intermittent once  ascorbic acid 500 milliGRAM(s) Oral daily  calcium gluconate IVPB 1 Gram(s) IV Intermittent once  chlorhexidine 0.12% Liquid 15 milliLiter(s) Oral Mucosa every 12 hours  chlorhexidine 2% Cloths 1 Application(s) Topical <User Schedule>  dextrose 5%. 1000 milliLiter(s) (50 mL/Hr) IV Continuous <Continuous>  dextrose 50% Injectable 25 Gram(s) IV Push once  docusate sodium Liquid 100 milliGRAM(s) Oral daily  Heparin 1000 Units / ml 2 milliLiter(s) 2 milliLiter(s) IV Push once  insulin regular  human corrective regimen sliding scale   SubCutaneous every 6 hours  magnesium sulfate  IVPB 2 Gram(s) IV Intermittent once  meropenem  IVPB 1000 milliGRAM(s) IV Intermittent every 8 hours  meropenem  IVPB 1000 milliGRAM(s) IV Intermittent every 8 hours  midodrine 15 milliGRAM(s) Oral every 8 hours  norepinephrine Infusion 0.05 MICROgram(s)/kG/Min (5.466 mL/Hr) IV Continuous <Continuous>  nystatin Cream 1 Application(s) Topical two times a day  pantoprazole   Suspension 40 milliGRAM(s) Oral daily  petrolatum Ophthalmic Ointment 1 Application(s) Both EYES three times a day  potassium chloride   Powder 20 milliEquivalent(s) Oral once  senna 2 Tablet(s) Oral at bedtime  vancomycin  IVPB 1000 milliGRAM(s) IV Intermittent every 12 hours  zinc oxide 20% Ointment 1 Application(s) Topical three times a day    MEDICATIONS  (PRN):  dextrose 40% Gel 15 Gram(s) Oral once PRN Blood Glucose LESS THAN 70 milliGRAM(s)/deciliter  glucagon  Injectable 1 milliGRAM(s) IntraMuscular once PRN Glucose LESS THAN 70 milligrams/deciliter      ALLERGIES:  Allergies    No Known Allergies    Intolerances    IVIG PRODUCT IS PRIGIVEN OR GAMMUNEX (Unknown)      LABS:                        8.0    20.66 )-----------( 409      ( 22 May 2019 04:56 )             25.9     05-22    141  |  100  |  15  ----------------------------<  138<H>  3.9   |  26  |  0.54    Ca    8.8      22 May 2019 04:57  Phos  4.4     05-22  Mg     1.6     05-22    TPro  5.9<L>  /  Alb  4.6  /  TBili  1.0  /  DBili  x   /  AST  12  /  ALT  <5<L>  /  AlkPhos  26<L>  05-22    PT/INR - ( 22 May 2019 04:57 )   PT: 14.6 sec;   INR: 1.28          PTT - ( 22 May 2019 04:57 )  PTT:28.9 sec      RADIOLOGY & ADDITIONAL TESTS: Reviewed. INTERVAL HPI/OVERNIGHT EVENTS: Spiked a temp of 101.6F. Given vancomycin and meropenem. BCx drawn, UA sent. Lactate normal     SUBJECTIVE: Patient seen and examined at bedside. Pt complaining of headache this morning. Noted to have a lot of secretions today. Otherwise very comfortable today.     CONSTITUTIONAL: +fevers, chills   EYES/ENT: No visual changes;  No vertigo or throat pain   NECK: + pain at trach site   RESPIRATORY: No shortness of breath. + secretions   CARDIOVASCULAR: No chest pain or palpitations  GASTROINTESTINAL: No abdominal or epigastric pain. No nausea, vomiting, or hematemesis; No diarrhea or constipation. No melena or hematochezia.  GENITOURINARY: No dysuria, frequency or hematuria  NEUROLOGICAL: +weakness  SKIN: No itching, rashes    OBJECTIVE:    VITAL SIGNS:  ICU Vital Signs Last 24 Hrs  T(C): 37.6 (22 May 2019 05:20), Max: 38.7 (22 May 2019 05:00)  T(F): 99.6 (22 May 2019 05:20), Max: 101.6 (22 May 2019 05:00)  HR: 89 (22 May 2019 05:32) (50 - 92)  BP: 108/48 (22 May 2019 05:00) (74/45 - 193/74)  BP(mean): 70 (22 May 2019 05:00) (44 - 113)  ABP: --  ABP(mean): --  RR: 20 (22 May 2019 05:00) (14 - 39)  SpO2: 100% (22 May 2019 05:32) (99% - 100%)    Mode: AC/ CMV (Assist Control/ Continuous Mandatory Ventilation), RR (machine): 12, TV (machine): 320, FiO2: 40, PEEP: 5, ITime: 1, MAP: 8.7, PIP: 18    05-20 @ 07:01  -  05-21 @ 07:00  --------------------------------------------------------  IN: 760 mL / OUT: 880 mL / NET: -120 mL    05-21 @ 07:01  -  05-22 @ 06:39  --------------------------------------------------------  IN: 612.7 mL / OUT: 865 mL / NET: -252.3 mL      CAPILLARY BLOOD GLUCOSE      POCT Blood Glucose.: 141 mg/dL (22 May 2019 05:27)      PHYSICAL EXAM:    General: NAD, comfortable, tolerating CPAP  HEENT: NCAT, PERRL, clear conjunctiva, no scleral icterus  Neck: supple, no JVD  Respiratory:  Rhonchi bilaterally upper> lower lung fields   Cardiovascular: RRR, normal S1S2, no M/R/G  Vascular: 1+ radial and DP pulses  Abdomen: soft, NT/ND, bowel sounds present, L colostomy w pink stoma, no palpable masses, PEG tube on place ( w/ some dry blood around)   Extremities: warm to touch, no clubbing, cyanosis, or edema  Skin: Erythema over buttocks bilaterally   Neuro: Awake and alert, following all commands. 2/5 in LE b/l, 4/5 UE b/l  No focal deficits    MEDICATIONS:  MEDICATIONS  (STANDING):  albumin human  5% IVPB 2750 milliLiter(s) IV Intermittent once  ascorbic acid 500 milliGRAM(s) Oral daily  calcium gluconate IVPB 1 Gram(s) IV Intermittent once  chlorhexidine 0.12% Liquid 15 milliLiter(s) Oral Mucosa every 12 hours  chlorhexidine 2% Cloths 1 Application(s) Topical <User Schedule>  dextrose 5%. 1000 milliLiter(s) (50 mL/Hr) IV Continuous <Continuous>  dextrose 50% Injectable 25 Gram(s) IV Push once  docusate sodium Liquid 100 milliGRAM(s) Oral daily  Heparin 1000 Units / ml 2 milliLiter(s) 2 milliLiter(s) IV Push once  insulin regular  human corrective regimen sliding scale   SubCutaneous every 6 hours  magnesium sulfate  IVPB 2 Gram(s) IV Intermittent once  meropenem  IVPB 1000 milliGRAM(s) IV Intermittent every 8 hours  meropenem  IVPB 1000 milliGRAM(s) IV Intermittent every 8 hours  midodrine 15 milliGRAM(s) Oral every 8 hours  norepinephrine Infusion 0.05 MICROgram(s)/kG/Min (5.466 mL/Hr) IV Continuous <Continuous>  nystatin Cream 1 Application(s) Topical two times a day  pantoprazole   Suspension 40 milliGRAM(s) Oral daily  petrolatum Ophthalmic Ointment 1 Application(s) Both EYES three times a day  potassium chloride   Powder 20 milliEquivalent(s) Oral once  senna 2 Tablet(s) Oral at bedtime  vancomycin  IVPB 1000 milliGRAM(s) IV Intermittent every 12 hours  zinc oxide 20% Ointment 1 Application(s) Topical three times a day    MEDICATIONS  (PRN):  dextrose 40% Gel 15 Gram(s) Oral once PRN Blood Glucose LESS THAN 70 milliGRAM(s)/deciliter  glucagon  Injectable 1 milliGRAM(s) IntraMuscular once PRN Glucose LESS THAN 70 milligrams/deciliter      ALLERGIES:  Allergies    No Known Allergies    Intolerances    IVIG PRODUCT IS PRIGIVEN OR GAMMUNEX (Unknown)      LABS:                        8.0    20.66 )-----------( 409      ( 22 May 2019 04:56 )             25.9     05-22    141  |  100  |  15  ----------------------------<  138<H>  3.9   |  26  |  0.54    Ca    8.8      22 May 2019 04:57  Phos  4.4     05-22  Mg     1.6     05-22    TPro  5.9<L>  /  Alb  4.6  /  TBili  1.0  /  DBili  x   /  AST  12  /  ALT  <5<L>  /  AlkPhos  26<L>  05-22    PT/INR - ( 22 May 2019 04:57 )   PT: 14.6 sec;   INR: 1.28          PTT - ( 22 May 2019 04:57 )  PTT:28.9 sec      RADIOLOGY & ADDITIONAL TESTS: Reviewed.

## 2019-05-22 NOTE — PROGRESS NOTE ADULT - SUBJECTIVE AND OBJECTIVE BOX
Pt seen and examined at bedside. No complaints currently. Watching tv and writing in notepad. Levo at 18    Vital Signs Last 24 Hrs  T(C): 38.2 (22 May 2019 09:43), Max: 39.1 (22 May 2019 07:00)  T(F): 100.8 (22 May 2019 09:43), Max: 102.4 (22 May 2019 07:00)  HR: 88 (22 May 2019 13:00) (50 - 96)  BP: 119/69 (22 May 2019 13:00) (84/38 - 193/74)  BP(mean): 89 (22 May 2019 13:00) (44 - 105)  RR: 22 (22 May 2019 13:00) (14 - 39)  SpO2: 100% (22 May 2019 13:00) (99% - 100%)    I&O's Detail    22 May 2019 07:01  -  22 May 2019 13:09  --------------------------------------------------------  IN:    norepinephrine Infusion: 30 mL  Total IN: 30 mL    OUT:    Indwelling Catheter - Urethral: 105 mL  Total OUT: 105 mL    Total NET: -75 mL            MEDICATIONS  (STANDING):  albumin human  5% IVPB 2750 milliLiter(s) IV Intermittent once  ascorbic acid 500 milliGRAM(s) Oral daily  calcium gluconate IVPB 1 Gram(s) IV Intermittent once  chlorhexidine 0.12% Liquid 15 milliLiter(s) Oral Mucosa every 12 hours  chlorhexidine 2% Cloths 1 Application(s) Topical <User Schedule>  dextrose 5%. 1000 milliLiter(s) (50 mL/Hr) IV Continuous <Continuous>  dextrose 50% Injectable 25 Gram(s) IV Push once  docusate sodium Liquid 100 milliGRAM(s) Oral daily  Heparin 1000 Units / ml 2 milliLiter(s) 2 milliLiter(s) IV Push once  insulin regular  human corrective regimen sliding scale   SubCutaneous every 6 hours  meropenem  IVPB 1000 milliGRAM(s) IV Intermittent every 8 hours  midodrine 15 milliGRAM(s) Oral every 8 hours  norepinephrine Infusion 0.05 MICROgram(s)/kG/Min (5.466 mL/Hr) IV Continuous <Continuous>  nystatin Cream 1 Application(s) Topical two times a day  pantoprazole   Suspension 40 milliGRAM(s) Oral daily  petrolatum Ophthalmic Ointment 1 Application(s) Both EYES three times a day  senna 2 Tablet(s) Oral at bedtime  vancomycin  IVPB 1000 milliGRAM(s) IV Intermittent every 12 hours  zinc oxide 20% Ointment 1 Application(s) Topical three times a day    MEDICATIONS  (PRN):  dextrose 40% Gel 15 Gram(s) Oral once PRN Blood Glucose LESS THAN 70 milliGRAM(s)/deciliter  glucagon  Injectable 1 milliGRAM(s) IntraMuscular once PRN Glucose LESS THAN 70 milligrams/deciliter      Physical Exam:  Constitutional: NAD  Pulmonary: ET tube with CPAP  Cardiovascular: Regular rate and rhythm   Abdomen: Soft, nontender, nondistended, no guarding, no rebound. ostomy in place with stool output. PEG clean are incision  Extremities: no lower extremity edema or calve tenderness. SCDs in place     LABS:                 8.0    20.66  )----------(  409       ( 22 May 2019 04:56 )               25.9      141    |  100    |  15     ----------------------------<  138        ( 22 May 2019 04:57 )  3.9     |  26     |  0.54     Ca    8.8        ( 22 May 2019 04:57 )  Phos  4.4       ( 22 May 2019 04:57 )  Mg     1.6       ( 22 May 2019 04:57 )    TPro  5.9    /  Alb  4.6    /  TBili  1.0    /  DBili  x      /  AST  12     /  ALT  <5     /  AlkPhos  26     ( 22 May 2019 04:57 )    LIVER FUNCTIONS - ( 22 May 2019 04:57 )  Alb: 4.6 g/dL / Pro: 5.9 g/dL / ALK PHOS: 26 U/L / ALT: <5 U/L / AST: 12 U/L / GGT: x           PT/INR -  14.6 sec / 1.28    ( 22 May 2019 04:57 )       PTT -  28.9 sec   ( 22 May 2019 04:57 )  CAPILLARY BLOOD GLUCOSE            RADIOLOGY & ADDITIONAL TESTS:

## 2019-05-22 NOTE — PROGRESS NOTE ADULT - PROBLEM SELECTOR PLAN 2
Now with fever again and elevated WBC.  Source not clear. Thick secretions.  W/U in progress.  On vanc and meropenum     Not good

## 2019-05-22 NOTE — PROGRESS NOTE ADULT - SUBJECTIVE AND OBJECTIVE BOX
TIM MCDANIEL             [id number]    CC:  weakness, GOC    HPI:  60 yo G0 w/ known stage IV endometrial carcinoma s/p staging surgery 7/2018 and 1 cycle of chemotherapy in 2/2019 followed by multiple admissions for management of symptomatic enterovaginal fistula, complex fistula associated urinary tract infection, bacteremia presents on referral from [facility] after fever developing fever.  Found to have severe sepsis, required intubation and treatment of UTI,.  Initially improved but then declined neurologically and required intubation for hypercapnic respiratory failure.  Was extubated and did well for several days.    SUBJECTIVE:  Patient reintubated 5/13 secondary to profound weakness.  Became much more awake when hypercapnia corrected. Has undergone 4 rounds of plasmapheresis and Today reports feeling stronger.  In the past she has been adamant that she does not want a tracheostomy or a PEG and plan was for extubation after her next plasmapheresis and no reintubation.    Patient reconsidered her decision to forgo reintubation on Monday, and a trach was placed yesterday.  Today she is awake alert, much more comfortable.  Shows me her neck and shoulder strength.  Remains hopeful that she can get off ventilator and have trach removed.  Wants to know if PEG is permanent      Did not do well on CPAP    ROS:  DYSPNEA: No  NAUS/VOM: No  SECRETIONS: Yes, some difficulty clearing secretions	  AGITATION: No  Pain (Y/N): No  -Provocation/Palliation:  -Quality/Quantity:  -Radiating:  -Severity:  -Timing/Frequency:  -Impact on ADLs:    OTHER REVIEW OF SYSTEMS: no other complaints      PEx:  Vital Signs Last 24 Hrs  T(C): 37.8 (22 May 2019 13:22), Max: 39.1 (22 May 2019 07:00)  T(F): 100 (22 May 2019 13:22), Max: 102.4 (22 May 2019 07:00)  HR: 96 (22 May 2019 14:00) (50 - 96)  BP: 121/59 (22 May 2019 14:00) (84/38 - 193/74)  BP(mean): 77 (22 May 2019 14:00) (44 - 105)  RR: 31 (22 May 2019 14:00) (14 - 39)  SpO2: 100% (22 May 2019 14:00) (99% - 100%))    General: frail female, chronically ill appearing  comfortable in bed ; appearing older than stated age, pale, engaged in conversation and writing on a pad without difficulty  HEENT: moderate alopecia; conjunctival pallor;  NG tube removed, smiles at times  Neck: supple, but appears stronger with more head control.  Able to shake head vigorously "yes" and "no" trach in place  CVS: S1/S2, RRR  Resp: on ventilator  GI: soft, non-tender  Musc: hypotrophic muscularity and bulk, particularly of UE/hands, LE; profound kyphoscoliosis muscle wasting of left trapezius and latissimus dorsi  Neuro: awake and alert; shrugs shoulders moderate neck strength, squeezes my hand  Psych: overall in better spirits  Skin: intact	     ALLERGIES: IVIG PRODUCT IS PRIGIVEN OR GAMMUNEX (Unknown)  No Known Allergies    OPIATE NAÏVE (Y/N): Yes on presentation, had received opiates earlier in admission    MEDICATIONS: REVIEWED  MEDICATIONS  (STANDING):  albumin human  5% IVPB 2750 milliLiter(s) IV Intermittent once  ascorbic acid 500 milliGRAM(s) Oral daily  calcium gluconate IVPB 1 Gram(s) IV Intermittent once  chlorhexidine 0.12% Liquid 15 milliLiter(s) Oral Mucosa every 12 hours  chlorhexidine 2% Cloths 1 Application(s) Topical <User Schedule>  dextrose 5%. 1000 milliLiter(s) (50 mL/Hr) IV Continuous <Continuous>  dextrose 50% Injectable 25 Gram(s) IV Push once  docusate sodium Liquid 100 milliGRAM(s) Oral daily  Heparin 1000 Units / ml 2 milliLiter(s) 2 milliLiter(s) IV Push once  insulin regular  human corrective regimen sliding scale   SubCutaneous every 6 hours  meropenem  IVPB 1000 milliGRAM(s) IV Intermittent every 8 hours  midodrine 15 milliGRAM(s) Oral every 8 hours  norepinephrine Infusion 0.05 MICROgram(s)/kG/Min (5.466 mL/Hr) IV Continuous <Continuous>  nystatin Cream 1 Application(s) Topical two times a day  pantoprazole   Suspension 40 milliGRAM(s) Oral daily  petrolatum Ophthalmic Ointment 1 Application(s) Both EYES three times a day  senna 2 Tablet(s) Oral at bedtime  vancomycin  IVPB 1000 milliGRAM(s) IV Intermittent every 12 hours  zinc oxide 20% Ointment 1 Application(s) Topical three times a day    MEDICATIONS  (PRN):  dextrose 40% Gel 15 Gram(s) Oral once PRN Blood Glucose LESS THAN 70 milliGRAM(s)/deciliter  glucagon  Injectable 1 milliGRAM(s) IntraMuscular once PRN Glucose LESS THAN 70 milligrams/deciliter                    LABS: REVIEWED                                                         8.0    20.66 )-----------( 409      ( 22 May 2019 04:56 )             25.9   05-22    141  |  100  |  15  ----------------------------<  138<H>  3.9   |  26  |  0.54    Ca    8.8      22 May 2019 04:57  Phos  4.4     05-22  Mg     1.6     05-22    TPro  5.9<L>  /  Alb  4.6  /  TBili  1.0  /  DBili  x   /  AST  12  /  ALT  <5<L>  /  AlkPhos  26<L>  05-22                                      IMAGING: REVIEWED    ADVANCED DIRECTIVES:     see note above, DNR, trach    DECISION MAKER: patient/self  LEGAL SURROGATE: Esha Florescatalinaramon (sister) [phone]    PSYCHOSOCIAL-SPIRITUAL ASSESSMENT:       Reviewed       Care plan as below	    GOALS OF CARE DISCUSSION       Palliative care info/counseling provided previously.  Awaiting arrival of Esha roblero	           Family meeting       Advanced Directives addressed please see Advance Care Planning Note	           See previous Palliative Medicine Notes       Documentation of GOC: Agrees with efforts to treat,     agrees to trach and PEG  	      AGENCY CHOICE DISCUSSED:     Too acute to discuss    REFERRALS	        Palliative Med        Unit SW/Case Mgmt        - declined       Speech/Swallow - failed dysphagia; has NGT currently       Patient/Family Support       Massage Therapy       Music Therapy       Hospice - not applicable at present       Nutrition       PT/OT

## 2019-05-22 NOTE — PROGRESS NOTE ADULT - SUBJECTIVE AND OBJECTIVE BOX
Pt seen and examined at bedside. Seen at bedside with Dr. Spivey. Feels better with ET tube. PEG placed yesterday evening. No complaints currently.     Vital Signs Last 24 Hrs  T(C): 38.2 (22 May 2019 09:43), Max: 39.1 (22 May 2019 07:00)  T(F): 100.8 (22 May 2019 09:43), Max: 102.4 (22 May 2019 07:00)  HR: 88 (22 May 2019 09:14) (50 - 96)  BP: 101/52 (22 May 2019 09:00) (74/45 - 193/74)  BP(mean): 67 (22 May 2019 09:00) (44 - 113)  RR: 25 (22 May 2019 09:00) (14 - 39)  SpO2: 99% (22 May 2019 09:14) (99% - 100%)    I&O's Detail    22 May 2019 07:01  -  22 May 2019 10:03  --------------------------------------------------------  IN:    norepinephrine Infusion: 30 mL  Total IN: 30 mL    OUT:    Indwelling Catheter - Urethral: 105 mL  Total OUT: 105 mL    Total NET: -75 mL    MEDICATIONS  (STANDING):  albumin human  5% IVPB 2750 milliLiter(s) IV Intermittent once  ascorbic acid 500 milliGRAM(s) Oral daily  calcium gluconate IVPB 1 Gram(s) IV Intermittent once  chlorhexidine 0.12% Liquid 15 milliLiter(s) Oral Mucosa every 12 hours  chlorhexidine 2% Cloths 1 Application(s) Topical <User Schedule>  dextrose 5%. 1000 milliLiter(s) (50 mL/Hr) IV Continuous <Continuous>  dextrose 50% Injectable 25 Gram(s) IV Push once  docusate sodium Liquid 100 milliGRAM(s) Oral daily  Heparin 1000 Units / ml 2 milliLiter(s) 2 milliLiter(s) IV Push once  insulin regular  human corrective regimen sliding scale   SubCutaneous every 6 hours  meropenem  IVPB 1000 milliGRAM(s) IV Intermittent every 8 hours  midodrine 15 milliGRAM(s) Oral every 8 hours  norepinephrine Infusion 0.05 MICROgram(s)/kG/Min (5.466 mL/Hr) IV Continuous <Continuous>  nystatin Cream 1 Application(s) Topical two times a day  pantoprazole   Suspension 40 milliGRAM(s) Oral daily  petrolatum Ophthalmic Ointment 1 Application(s) Both EYES three times a day  senna 2 Tablet(s) Oral at bedtime  vancomycin  IVPB 1000 milliGRAM(s) IV Intermittent every 12 hours  zinc oxide 20% Ointment 1 Application(s) Topical three times a day    MEDICATIONS  (PRN):  dextrose 40% Gel 15 Gram(s) Oral once PRN Blood Glucose LESS THAN 70 milliGRAM(s)/deciliter  glucagon  Injectable 1 milliGRAM(s) IntraMuscular once PRN Glucose LESS THAN 70 milligrams/deciliter      Physical Exam:  Constitutional: NAD  Pulmonary: ET tube with CPAP  Cardiovascular: Regular rate and rhythm   Abdomen: Soft, nontender, nondistended, no guarding, no rebound. ostomy in place with stool output   Extremities: no lower extremity edema or calve tenderness. SCDs in place     LABS:                 8.0    20.66  )----------(  409       ( 22 May 2019 04:56 )               25.9      141    |  100    |  15     ----------------------------<  138        ( 22 May 2019 04:57 )  3.9     |  26     |  0.54     Ca    8.8        ( 22 May 2019 04:57 )  Phos  4.4       ( 22 May 2019 04:57 )  Mg     1.6       ( 22 May 2019 04:57 )    TPro  5.9    /  Alb  4.6    /  TBili  1.0    /  DBili  x      /  AST  12     /  ALT  <5     /  AlkPhos  26     ( 22 May 2019 04:57 )    LIVER FUNCTIONS - ( 22 May 2019 04:57 )  Alb: 4.6 g/dL / Pro: 5.9 g/dL / ALK PHOS: 26 U/L / ALT: <5 U/L / AST: 12 U/L / GGT: x           PT/INR -  14.6 sec / 1.28    ( 22 May 2019 04:57 )       PTT -  28.9 sec   ( 22 May 2019 04:57 )  CAPILLARY BLOOD GLUCOSE            RADIOLOGY & ADDITIONAL TESTS:

## 2019-05-22 NOTE — PROGRESS NOTE ADULT - ASSESSMENT
58yo F HD23 with stage IV endometrial adenocarcinoma s/p staging surgery '18 and 1 round of chemotherapy 2/19 readmitted from [facility] with fever, previously admitted for management of symptomatic rectovaginal and enterovaginal fistulas. Presents on referral from [facility] with urosepsis. Rapid response called due to worsening respiratory status s/p intubation and MICU admission. She then was on regional GYN service however had respiratory weakness and was transferred to Medicine/tele HD9 until requiring intubation again HD10 and is now in MICU. 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 She was re-intubated on 5/13 due to respiratory distress. Pt received plasmapheresis. No has ET tube and PEG placed 5/21. She was made DNR/DNI 5/14 and on 5/17 found to have upper extremity DVT. Fevers overnight 5/22 up to 102.4, started onmeropenem and vancomycin. Fever w/u initiated with cultures    Management per MICU.    1. 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 On propofol; requirements fluctuating   2. Pulm: Currently has trach tube that was placed 5/21,  (Extubated 5/10-transitioned to BiPAP initially, then on nasal canula 4L but requiered re-intubation). Discussions with family and pt now DNR/DNI.   3. Cardio: On norepinephrine   4. FEN/GI: Dobhoff in situ, held tube feeds. PEG placed 5/21 Repletion of electrolytes prn. Will start feeds through PEG this evening  5. : Adequate urine output, Joseph in place. f/u Last urine culture 5/18: 4,000 yeast (final)   - Joseph placed 5/8 after finding of overflow incontinence, replaced joseph 5/18  f/u urology recs regarding suprapubic catheter   6. ID: Currently febrile this AM. Max 102.4 at 700 and 100.8 at 943AM 5/22  - started meropenem 5/22 and vancomycin 1g 5/22  s/p ertapenem ended 5/14, s/p meropenem, s/p vanc, appreciate ID recs   7. Endocrine: FS, ISS, Lantus 15u qhs (started 5/14), Humulin 4u q6hr, Metformin 1000mg BID held at this time   8. VTE prophylaxis - SCDs, Lovenox 60mg BID given upper extremity DVT noted on 5/17. LE Dopplers negative   9. GYN malignancy  - Stopped anastrazole and initiated 3 weeks of megace 80mg BID followed by 3 weeks of tamoxifen.   - Started megace and metformin 4/10, was on Tamoxifen for 21 days (5/1/19-  5/17)- stopped given upper extremity DVT , once patient recovers from acute neurologic issue will reassess cancer treatment options   -  Avoid immunotherapy at this time given associated risk of autoimmune disease   10. Derm: monitor sacrum for s/s of pressure ulcer, now stage 2- continue with zinc oxide   11. Follow up PT/OT recs   12. Social work/palliative: DNR/DNI. Consider psych consult for depression    Plan for family meeting tomorrow.

## 2019-05-22 NOTE — PROGRESS NOTE ADULT - SUBJECTIVE AND OBJECTIVE BOX
NEUROLOGY CONSULT PROGRESS NOTE    INTERVAL HISTORY:    Patient now s/p trach and PEG. Spiked fevers overnight with purulent sputum and elevated WBC, started on Gina and Vanc.     REVIEW OF SYSTEMS:  As per HPI, otherwise negative for Constitutional, Eyes, Ears/Nose/Mouth/Throat, Neck, Cardiovascular, Respiratory, Gastrointestinal, Genitourinary, Skin, Endocrine, Musculoskeletal, Psychiatric, and Hematologic/Lymphatic.    MEDICATIONS:  acetaminophen    Suspension .. 650 milliGRAM(s) Oral once  albumin human  5% IVPB 2750 milliLiter(s) IV Intermittent once  ascorbic acid 500 milliGRAM(s) Oral daily  calcium gluconate IVPB 1 Gram(s) IV Intermittent once  chlorhexidine 0.12% Liquid 15 milliLiter(s) Oral Mucosa every 12 hours  chlorhexidine 2% Cloths 1 Application(s) Topical <User Schedule>  dextrose 40% Gel 15 Gram(s) Oral once PRN  dextrose 5%. 1000 milliLiter(s) IV Continuous <Continuous>  dextrose 50% Injectable 25 Gram(s) IV Push once  docusate sodium Liquid 100 milliGRAM(s) Oral daily  glucagon  Injectable 1 milliGRAM(s) IntraMuscular once PRN  Heparin 1000 Units / ml 2 milliLiter(s) 2 milliLiter(s) IV Push once  insulin regular  human corrective regimen sliding scale   SubCutaneous every 6 hours  magnesium sulfate  IVPB 2 Gram(s) IV Intermittent once  meropenem  IVPB 1000 milliGRAM(s) IV Intermittent every 8 hours  meropenem  IVPB 1000 milliGRAM(s) IV Intermittent every 8 hours  midodrine 15 milliGRAM(s) Oral every 8 hours  norepinephrine Infusion 0.05 MICROgram(s)/kG/Min IV Continuous <Continuous>  nystatin Cream 1 Application(s) Topical two times a day  pantoprazole   Suspension 40 milliGRAM(s) Oral daily  petrolatum Ophthalmic Ointment 1 Application(s) Both EYES three times a day  potassium chloride   Powder 20 milliEquivalent(s) Oral once  senna 2 Tablet(s) Oral at bedtime  vancomycin  IVPB 1000 milliGRAM(s) IV Intermittent every 12 hours  zinc oxide 20% Ointment 1 Application(s) Topical three times a day    VITAL SIGNS:  Vital Signs Last 24 Hrs  T(C): 39.1 (22 May 2019 07:00), Max: 39.1 (22 May 2019 07:00)  T(F): 102.4 (22 May 2019 07:00), Max: 102.4 (22 May 2019 07:00)  HR: 94 (22 May 2019 07:00) (50 - 94)  BP: 103/62 (22 May 2019 07:00) (74/45 - 193/74)  BP(mean): 92 (22 May 2019 07:00) (44 - 113)  RR: 33 (22 May 2019 07:00) (14 - 39)  SpO2: 100% (22 May 2019 07:00) (99% - 100%)    PHYSICAL EXAMINATION:  Constitutional: Frail middle age female, intubated, NG tube in place  Eyes: Conjunctiva and sclera clear  Neurologic:  - Mental Status:  Awake and alert, able to follow commands as directed  - Cranial Nerves II-XII:    II: Pupils are equal, round, and reactive to light.  III, IV, VI:  EOMI, horizontal nystagmus with lateral gaze  V:  Facial sensation is intact  VII:  mildly weak eye closure R > L   VIII:  Hearing is intact to finger rub.  XI: Mildly improved shoulder shrug, able to move head  - Motor: Hypotrophic muscles. Upper extremities 4/5; RLE 3/5; LLE 2/5, Ankle 0/5 b/l  - Reflexes:  absent throughout   - Sensory:  Intact to light touch  - Gait: Deferred    LABS:                          8.0    20.66 )-----------( 409      ( 22 May 2019 04:56 )             25.9     05-22    141  |  100  |  15  ----------------------------<  138<H>  3.9   |  26  |  0.54    Ca    8.8      22 May 2019 04:57  Phos  4.4     05-22  Mg     1.6     05-22    TPro  5.9<L>  /  Alb  4.6  /  TBili  1.0  /  DBili  x   /  AST  12  /  ALT  <5<L>  /  AlkPhos  26<L>  05-22  PT/INR - ( 22 May 2019 04:57 )   PT: 14.6 sec;   INR: 1.28     PTT - ( 22 May 2019 04:57 )  PTT:28.9 sec    RADIOLOGY & ADDITIONAL STUDIES:

## 2019-05-22 NOTE — PROGRESS NOTE ADULT - ASSESSMENT
60yo F HD23 with stage IV endometrial adenocarcinoma s/p staging surgery '18 and 1 round of chemotherapy 2/19 readmitted from [facility] with fever, previously admitted for management of symptomatic rectovaginal and enterovaginal fistulas. Presents on referral from [facility] with urosepsis. Rapid response called due to worsening respiratory status s/p intubation and MICU admission. She then was on regional GYN service however had respiratory weakness and was transferred to Medicine/tele HD9 until requiring intubation again HD10 and is now in MICU. 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 She was re-intubated on 5/13 due to respiratory distress. Pt received plasmapheresis. No has ET tube and PEG placed 5/21. She was made DNR/DNI 5/14 and on 5/17 found to have upper extremity DVT.   Management per MICU.    1. Neuro: Neurology following- weakness secondary Guillain Wilson versus exacerbation of AIDP- now status post IVIG x5 day course, Patient has likely failed IVIG treatment given current status. On propofol   2. Pulm: Currently has trach tube that was placed 5/21, was Extubated 5/10-transitioned to BiPAP initially, then on nasal canula 4L.  now DNR/DNI.   3. Cardio: On norepinephrine   4. FEN/GI: Dobhoff in situ, on tube feeds. PEG placed 5/21 Repletion of electrolytes prn  5. : Adequate urine output, Joseph in place. f/u Last urine culture 5/18: 4,000 yeast (final)   - Joseph placed 5/8 after finding of overflow incontinence, replaced joseph 5/18  f/u urology recs regarding suprapubic catheter   6. ID: Currently febrile this AM. Max 102.4 at 700 and 100.8 at 943AM 5/22  - started meropenem 5/22 and vancomycin 1g 5/22  s/p ertapenem ended 5/14, s/p meropenem, s/p vanc, appreciate ID recs   7. Endocrine: FS, ISS, Lantus 15u qhs (started 5/14), Humulin 4u q6hr, Metformin 1000mg BID held at this time   8. VTE prophylaxis - SCDs, Lovenox 60mg BID given upper extremity DVT noted on 5/17   9. GYN malignancy  - Stopped anastrazole and initiated 3 weeks of megace 80mg BID followed by 3 weeks of tamoxifen.   - Started megace and metformin 4/10, was on Tamoxifen for 21 days (5/1/19-  5/17)- stopped given upper extremity DVT , once patient recovers from acute neurologic issue will reassess cancer treatment options   -  Avoid immunotherapy at this time given associated risk of autoimmune disease   10. Derm: monitor sacrum for s/s of pressure ulcer, now stage 2- continue with zinc oxide   11. Follow up PT/OT recs   12. Social work/palliative: DNR/DNI. Consider psych consult for depression    Plan for family meeting tomorrow.

## 2019-05-22 NOTE — PROGRESS NOTE ADULT - SUBJECTIVE AND OBJECTIVE BOX
Pt seen and examined at bedside.    PERTINENT REVIEW OF SYSTEMS:  unable to assess    Allergies    No Known Allergies    Intolerances    IVIG PRODUCT IS PRIGIVEN OR GAMMUNEX (Unknown)    MEDICATIONS:  MEDICATIONS  (STANDING):  albumin human  5% IVPB 2750 milliLiter(s) IV Intermittent once  ascorbic acid 500 milliGRAM(s) Oral daily  calcium gluconate IVPB 1 Gram(s) IV Intermittent once  chlorhexidine 0.12% Liquid 15 milliLiter(s) Oral Mucosa every 12 hours  chlorhexidine 2% Cloths 1 Application(s) Topical <User Schedule>  dextrose 5%. 1000 milliLiter(s) (50 mL/Hr) IV Continuous <Continuous>  dextrose 50% Injectable 25 Gram(s) IV Push once  docusate sodium Liquid 100 milliGRAM(s) Oral daily  Heparin 1000 Units / ml 2 milliLiter(s) 2 milliLiter(s) IV Push once  insulin regular  human corrective regimen sliding scale   SubCutaneous every 6 hours  meropenem  IVPB 1000 milliGRAM(s) IV Intermittent every 8 hours  midodrine 15 milliGRAM(s) Oral every 8 hours  norepinephrine Infusion 0.05 MICROgram(s)/kG/Min (5.466 mL/Hr) IV Continuous <Continuous>  nystatin Cream 1 Application(s) Topical two times a day  pantoprazole   Suspension 40 milliGRAM(s) Oral daily  petrolatum Ophthalmic Ointment 1 Application(s) Both EYES three times a day  senna 2 Tablet(s) Oral at bedtime  vancomycin  IVPB 1000 milliGRAM(s) IV Intermittent every 12 hours  zinc oxide 20% Ointment 1 Application(s) Topical three times a day    MEDICATIONS  (PRN):  dextrose 40% Gel 15 Gram(s) Oral once PRN Blood Glucose LESS THAN 70 milliGRAM(s)/deciliter  glucagon  Injectable 1 milliGRAM(s) IntraMuscular once PRN Glucose LESS THAN 70 milligrams/deciliter    Vital Signs Last 24 Hrs  T(C): 39.1 (22 May 2019 07:00), Max: 39.1 (22 May 2019 07:00)  T(F): 102.4 (22 May 2019 07:00), Max: 102.4 (22 May 2019 07:00)  HR: 88 (22 May 2019 09:14) (50 - 96)  BP: 101/52 (22 May 2019 09:00) (74/45 - 193/74)  BP(mean): 67 (22 May 2019 09:00) (44 - 113)  RR: 25 (22 May 2019 09:00) (14 - 39)  SpO2: 99% (22 May 2019 09:14) (99% - 100%)    05-21 @ 07:01  -  05-22 @ 07:00  --------------------------------------------------------  IN: 717.7 mL / OUT: 1205 mL / NET: -487.3 mL      PHYSICAL EXAM:    General: frail; in no acute distress  HEENT: MMM, conjunctiva and sclera clear  Gastrointestinal: Soft non-tender non-distended; Normal bowel sounds; No hepatosplenomegaly. No rebound or guarding; PEG in place  Skin: Warm and dry. No obvious rash    LABS:                        8.0    20.66 )-----------( 409      ( 22 May 2019 04:56 )             25.9     05-22    141  |  100  |  15  ----------------------------<  138<H>  3.9   |  26  |  0.54    Ca    8.8      22 May 2019 04:57  Phos  4.4     05-22  Mg     1.6     05-22    TPro  5.9<L>  /  Alb  4.6  /  TBili  1.0  /  DBili  x   /  AST  12  /  ALT  <5<L>  /  AlkPhos  26<L>  05-22    PT/INR - ( 22 May 2019 04:57 )   PT: 14.6 sec;   INR: 1.28          PTT - ( 22 May 2019 04:57 )  PTT:28.9 sec                  Culture - Fungal, Bronchial (collected 22 May 2019 02:20)  Source: .Bronchial bronch wash  Preliminary Report (22 May 2019 08:26):    Testing in progress    Culture - Bronchial (collected 21 May 2019 17:23)  Source: Bronch Wash bronch wash  Gram Stain (22 May 2019 08:44):    Numerous white blood cells    Rare Squamous Epithelial Cells    Numerous Gram Positive Cocci in Clusters  Preliminary Report (22 May 2019 08:51):    Moderate Staphylococcus aureus    Susceptibility to follow.    No routine respiratory fani Isolated    Culture - Blood (collected 19 May 2019 20:46)  Source: .Blood Blood  Preliminary Report (21 May 2019 21:00):    No growth at 2 days.    Culture - Blood (collected 19 May 2019 20:46)  Source: .Blood Blood  Preliminary Report (21 May 2019 21:00):    No growth at 2 days.      RADIOLOGY & ADDITIONAL STUDIES: Pt seen and examined at bedside.  Fever overnight    PERTINENT REVIEW OF SYSTEMS:  unable to assess    Allergies    No Known Allergies    Intolerances    IVIG PRODUCT IS PRIGIVEN OR GAMMUNEX (Unknown)    MEDICATIONS:  MEDICATIONS  (STANDING):  albumin human  5% IVPB 2750 milliLiter(s) IV Intermittent once  ascorbic acid 500 milliGRAM(s) Oral daily  calcium gluconate IVPB 1 Gram(s) IV Intermittent once  chlorhexidine 0.12% Liquid 15 milliLiter(s) Oral Mucosa every 12 hours  chlorhexidine 2% Cloths 1 Application(s) Topical <User Schedule>  dextrose 5%. 1000 milliLiter(s) (50 mL/Hr) IV Continuous <Continuous>  dextrose 50% Injectable 25 Gram(s) IV Push once  docusate sodium Liquid 100 milliGRAM(s) Oral daily  Heparin 1000 Units / ml 2 milliLiter(s) 2 milliLiter(s) IV Push once  insulin regular  human corrective regimen sliding scale   SubCutaneous every 6 hours  meropenem  IVPB 1000 milliGRAM(s) IV Intermittent every 8 hours  midodrine 15 milliGRAM(s) Oral every 8 hours  norepinephrine Infusion 0.05 MICROgram(s)/kG/Min (5.466 mL/Hr) IV Continuous <Continuous>  nystatin Cream 1 Application(s) Topical two times a day  pantoprazole   Suspension 40 milliGRAM(s) Oral daily  petrolatum Ophthalmic Ointment 1 Application(s) Both EYES three times a day  senna 2 Tablet(s) Oral at bedtime  vancomycin  IVPB 1000 milliGRAM(s) IV Intermittent every 12 hours  zinc oxide 20% Ointment 1 Application(s) Topical three times a day    MEDICATIONS  (PRN):  dextrose 40% Gel 15 Gram(s) Oral once PRN Blood Glucose LESS THAN 70 milliGRAM(s)/deciliter  glucagon  Injectable 1 milliGRAM(s) IntraMuscular once PRN Glucose LESS THAN 70 milligrams/deciliter    Vital Signs Last 24 Hrs  T(C): 39.1 (22 May 2019 07:00), Max: 39.1 (22 May 2019 07:00)  T(F): 102.4 (22 May 2019 07:00), Max: 102.4 (22 May 2019 07:00)  HR: 88 (22 May 2019 09:14) (50 - 96)  BP: 101/52 (22 May 2019 09:00) (74/45 - 193/74)  BP(mean): 67 (22 May 2019 09:00) (44 - 113)  RR: 25 (22 May 2019 09:00) (14 - 39)  SpO2: 99% (22 May 2019 09:14) (99% - 100%)    05-21 @ 07:01  -  05-22 @ 07:00  --------------------------------------------------------  IN: 717.7 mL / OUT: 1205 mL / NET: -487.3 mL      PHYSICAL EXAM:    General: frail; in no acute distress, awake  HEENT: MMM, conjunctiva and sclera clear  Gastrointestinal: Soft non-tender non-distended; Normal bowel sounds; No hepatosplenomegaly. No rebound or guarding; PEG in place- site is c/d/i  Skin: Warm and dry. No obvious rash    LABS:                        8.0    20.66 )-----------( 409      ( 22 May 2019 04:56 )             25.9     05-22    141  |  100  |  15  ----------------------------<  138<H>  3.9   |  26  |  0.54    Ca    8.8      22 May 2019 04:57  Phos  4.4     05-22  Mg     1.6     05-22    TPro  5.9<L>  /  Alb  4.6  /  TBili  1.0  /  DBili  x   /  AST  12  /  ALT  <5<L>  /  AlkPhos  26<L>  05-22    PT/INR - ( 22 May 2019 04:57 )   PT: 14.6 sec;   INR: 1.28          PTT - ( 22 May 2019 04:57 )  PTT:28.9 sec                  Culture - Fungal, Bronchial (collected 22 May 2019 02:20)  Source: .Bronchial bronch wash  Preliminary Report (22 May 2019 08:26):    Testing in progress    Culture - Bronchial (collected 21 May 2019 17:23)  Source: Bronch Wash bronch wash  Gram Stain (22 May 2019 08:44):    Numerous white blood cells    Rare Squamous Epithelial Cells    Numerous Gram Positive Cocci in Clusters  Preliminary Report (22 May 2019 08:51):    Moderate Staphylococcus aureus    Susceptibility to follow.    No routine respiratory fani Isolated    Culture - Blood (collected 19 May 2019 20:46)  Source: .Blood Blood  Preliminary Report (21 May 2019 21:00):    No growth at 2 days.    Culture - Blood (collected 19 May 2019 20:46)  Source: .Blood Blood  Preliminary Report (21 May 2019 21:00):    No growth at 2 days.      RADIOLOGY & ADDITIONAL STUDIES: Pt seen and examined at bedside.  Fever overnight    PERTINENT REVIEW OF SYSTEMS:  unable to assess    Allergies    No Known Allergies    Intolerances    IVIG PRODUCT IS PRIGIVEN OR GAMMUNEX (Unknown)    MEDICATIONS:  MEDICATIONS  (STANDING):  albumin human  5% IVPB 2750 milliLiter(s) IV Intermittent once  ascorbic acid 500 milliGRAM(s) Oral daily  calcium gluconate IVPB 1 Gram(s) IV Intermittent once  chlorhexidine 0.12% Liquid 15 milliLiter(s) Oral Mucosa every 12 hours  chlorhexidine 2% Cloths 1 Application(s) Topical <User Schedule>  dextrose 5%. 1000 milliLiter(s) (50 mL/Hr) IV Continuous <Continuous>  dextrose 50% Injectable 25 Gram(s) IV Push once  docusate sodium Liquid 100 milliGRAM(s) Oral daily  Heparin 1000 Units / ml 2 milliLiter(s) 2 milliLiter(s) IV Push once  insulin regular  human corrective regimen sliding scale   SubCutaneous every 6 hours  meropenem  IVPB 1000 milliGRAM(s) IV Intermittent every 8 hours  midodrine 15 milliGRAM(s) Oral every 8 hours  norepinephrine Infusion 0.05 MICROgram(s)/kG/Min (5.466 mL/Hr) IV Continuous <Continuous>  nystatin Cream 1 Application(s) Topical two times a day  pantoprazole   Suspension 40 milliGRAM(s) Oral daily  petrolatum Ophthalmic Ointment 1 Application(s) Both EYES three times a day  senna 2 Tablet(s) Oral at bedtime  vancomycin  IVPB 1000 milliGRAM(s) IV Intermittent every 12 hours  zinc oxide 20% Ointment 1 Application(s) Topical three times a day    MEDICATIONS  (PRN):  dextrose 40% Gel 15 Gram(s) Oral once PRN Blood Glucose LESS THAN 70 milliGRAM(s)/deciliter  glucagon  Injectable 1 milliGRAM(s) IntraMuscular once PRN Glucose LESS THAN 70 milligrams/deciliter    Vital Signs Last 24 Hrs  T(C): 39.1 (22 May 2019 07:00), Max: 39.1 (22 May 2019 07:00)  T(F): 102.4 (22 May 2019 07:00), Max: 102.4 (22 May 2019 07:00)  HR: 88 (22 May 2019 09:14) (50 - 96)  BP: 101/52 (22 May 2019 09:00) (74/45 - 193/74)  BP(mean): 67 (22 May 2019 09:00) (44 - 113)  RR: 25 (22 May 2019 09:00) (14 - 39)  SpO2: 99% (22 May 2019 09:14) (99% - 100%)    05-21 @ 07:01  -  05-22 @ 07:00  --------------------------------------------------------  IN: 717.7 mL / OUT: 1205 mL / NET: -487.3 mL      PHYSICAL EXAM:    General: frail; in no acute distress, awake, alert  HEENT: MMM, conjunctiva and sclera clear, trach in place  Gastrointestinal: Soft non-tender non-distended; Normal bowel sounds; No hepatosplenomegaly. No rebound or guarding; PEG in place- site is c/d/i  Skin: Warm and dry. No obvious rash    LABS:                        8.0    20.66 )-----------( 409      ( 22 May 2019 04:56 )             25.9     05-22    141  |  100  |  15  ----------------------------<  138<H>  3.9   |  26  |  0.54    Ca    8.8      22 May 2019 04:57  Phos  4.4     05-22  Mg     1.6     05-22    TPro  5.9<L>  /  Alb  4.6  /  TBili  1.0  /  DBili  x   /  AST  12  /  ALT  <5<L>  /  AlkPhos  26<L>  05-22    PT/INR - ( 22 May 2019 04:57 )   PT: 14.6 sec;   INR: 1.28          PTT - ( 22 May 2019 04:57 )  PTT:28.9 sec                  Culture - Fungal, Bronchial (collected 22 May 2019 02:20)  Source: .Bronchial bronch wash  Preliminary Report (22 May 2019 08:26):    Testing in progress    Culture - Bronchial (collected 21 May 2019 17:23)  Source: Bronch Wash bronch wash  Gram Stain (22 May 2019 08:44):    Numerous white blood cells    Rare Squamous Epithelial Cells    Numerous Gram Positive Cocci in Clusters  Preliminary Report (22 May 2019 08:51):    Moderate Staphylococcus aureus    Susceptibility to follow.    No routine respiratory fani Isolated    Culture - Blood (collected 19 May 2019 20:46)  Source: .Blood Blood  Preliminary Report (21 May 2019 21:00):    No growth at 2 days.    Culture - Blood (collected 19 May 2019 20:46)  Source: .Blood Blood  Preliminary Report (21 May 2019 21:00):    No growth at 2 days.      RADIOLOGY & ADDITIONAL STUDIES:

## 2019-05-22 NOTE — PROGRESS NOTE ADULT - PROBLEM SELECTOR PLAN 3
responding to plasmapheresis with increased strength in upper body  - prognosis overall remains guarded given her multiple chronic comorbidities, the prospect of cytotoxic chemotherapy initiation for her malignancy per GYN, and now with the third need for intubation  Change in plans regarding advance directives.  See above.  I think with her increased strength she wants to try more aggressive approach  Trached yesterday

## 2019-05-22 NOTE — PROGRESS NOTE ADULT - PROBLEM SELECTOR PLAN 6
Originally presented w/ septic shock of  source w/ bacteremia requiring pressor support and intubation -- shock now resolved. Cleared blood cultures on ABx.  - has completed antibiotic course  -but back on antibiotics following recurrence of fever and leukocytosis

## 2019-05-22 NOTE — PROGRESS NOTE ADULT - ASSESSMENT
10 yo F PMHx polio, breast cancer, DM, Endometrial Cancer s/p exploratory laparotomy, enterolysis, SHAMIR, BSO, pelvic lymphadenectomy, low anterior resection mobilization of splenic flexure, end colostomy on 7/30/18, rectovaginal fistula, numerous admissions for urinary tract infection presented to [facility] in the setting of urosepsis. Hospital course complicated by blood stream infection (currently on ertapenem) and respiratory failure requiring intubation s/p extubation on 5/2. Intubated urgently on 7 lachman for CO2 narcosis in setting of AIDP and stepped up to MICU, initially improving s/p IVIG and extubated to bipap on 5/10, reintubated for CO2 narcosis on 5/14 and started on plasmapheresis     CARDIOVASCULAR  #Hemodynamics  Stable  - making appropriate amounts of urine, mentating well  - Still on levophed, with increased requirements. Continue midodrine 15mg TID. Will attempt to down titrate levophed to MAP above 65    PULMONARY  #Acute hypercapnic respiratory failure   2/2 to demyelinating polyneuropathy (AIDP), patient extubated to bipap on 5/10, reintubated on 5/13 for CO2 narcosis  - s/p 4 doses of IVIG finished 5/11 and started on plasmapharesis on 5/13. Completed 4 rounds already. 5th round today ( 5 total)  - Pt has been tolerating CPAP trials well. Given her hx of multiple intubations in the past given her AIDP, pt made herself DNR/DNI. She does want a tracheostomy done   - Plan for trach today in the afternoon       #Hx of urinary retention  - [place] changed on 5/17     GI  # PEG placement  - Pt to get PEG placed tomorrow w/ GI  - NPO @midnight for PEG     Neurology  #AIDP  Patient w AIDP leading to respiratory compromise, previous EMG w demyelinating polyneuropathy  - recs per neurology  - s/p 4 rounds of IVIG, with dramatic improvement in strength and respiratory status, however decompensated on 5/13 and reintubated  - Will complete 5 rounds of plasmapheresis tonight. Overall, pt's strength is much improved and she appears stronger   - f/u MRI brain w/wo contrast and MR cervical spine w/wo contrast     INFECTIOUS DISEASE  #hx of recurrent ESBL ecoli UTI and bacteremia  - previously on admission patient admitted to MICU in septic shock 2/2 to ESBL ecoli bacteremia and UTI  - Completed tx w/ ertapenem on 5/14   - Repeat UCx w/ yeast     #Fever  - Pt spiked fever on 5/17. UA positive but UCx growing <4000 yeast  - CXR negative w/ no new infiltrates  - Fever most likely from left cephalic v. thrombus which is being treated   - Since pt otherwise clinically stable w/ low suspicion for underlying infection, will hold off on abx   -  BCx 5/17 NGTD. Repeat BCx 5/19 NGTD    HEME/ONC  # Anemia   - Hb 7.3 today; s/p 1u pRBC's 5/18  - If Hb continues to drop, will need CT abdomen/ pelvis to r/o RP bleed since pt is on Lovenox   - Maintain active T&S    #Stage IV endometrial adenocarcinoma  Per GYN-ONC w/ interval increase in tumor burden. Pt was treated with Anastrazole and initiated 3 weeks of megace 80mg BID followed by 3 weeks of tamoxifen.   - Pt was on tamoxifen and developed a left cephalic v. thrombus. Tamoxifen was dc'd on 5/17   - GYN-ONC following, appreciate further recs     #Cephalic vein thrombosis  - As above  - Pt on w/ Lovenox 60mg BID (5/17-). HOLD FOR TRACH TODAY and PEG tomorrow     FLUIDS/ELECTROLYTES/NUTRITION  -IVF: none  -Monitor, Replete to K>4 and Mg>2  -Diet: NPO for trach    PROPHYLAXIS  -DVT: Hold Lovenox and continue SCDs  -GI: none as on feeds    DISPO: MICU 58 yo F PMHx polio, breast cancer, DM, Endometrial Cancer s/p exploratory laparotomy, enterolysis, SHAMIR, BSO, pelvic lymphadenectomy, low anterior resection mobilization of splenic flexure, end colostomy on 7/30/18, rectovaginal fistula, numerous admissions for urinary tract infection presented to [facility] in the setting of urosepsis. Hospital course complicated by blood stream infection (currently on ertapenem) and respiratory failure requiring multiple intubations.     CARDIOVASCULAR  #Hemodynamics  Stable  - making appropriate amounts of urine, mentating well  - Still on levophed, with increased requirements. Continue midodrine 15mg TID. Will attempt to down titrate levophed to MAP above 65    PULMONARY  #Acute hypercapnic respiratory failure   2/2 to demyelinating polyneuropathy (AIDP), patient extubated to bipap on 5/10, reintubated on 5/13 for CO2 narcosis  - s/p 4 doses of IVIG finished 5/11 and started on plasmapharesis on 5/13. Completed 4 rounds already. 5th round today ( 5 total)  - Pt has been tolerating CPAP trials well. Given her hx of multiple intubations in the past given her AIDP, pt made herself DNR/DNI. She does want a tracheostomy done   - Plan for trach today in the afternoon       #Hx of urinary retention  - [place] changed on 5/17     GI  # PEG placement  - Pt to get PEG placed tomorrow w/ GI  - NPO @midnight for PEG     Neurology  #AIDP  Patient w AIDP leading to respiratory compromise, previous EMG w demyelinating polyneuropathy  - recs per neurology  - s/p 4 rounds of IVIG, with dramatic improvement in strength and respiratory status, however decompensated on 5/13 and reintubated  - Will complete 5 rounds of plasmapheresis tonight. Overall, pt's strength is much improved and she appears stronger   - f/u MRI brain w/wo contrast and MR cervical spine w/wo contrast     INFECTIOUS DISEASE  #hx of recurrent ESBL ecoli UTI and bacteremia  - previously on admission patient admitted to MICU in septic shock 2/2 to ESBL ecoli bacteremia and UTI  - Completed tx w/ ertapenem on 5/14   - Repeat UCx w/ yeast     #Fever  - Pt spiked fever on 5/17. UA positive but UCx growing <4000 yeast  - CXR negative w/ no new infiltrates  - Fever most likely from left cephalic v. thrombus which is being treated   - Since pt otherwise clinically stable w/ low suspicion for underlying infection, will hold off on abx   -  BCx 5/17 NGTD. Repeat BCx 5/19 NGTD    HEME/ONC  # Anemia   - Hb 7.3 today; s/p 1u pRBC's 5/18  - If Hb continues to drop, will need CT abdomen/ pelvis to r/o RP bleed since pt is on Lovenox   - Maintain active T&S    #Stage IV endometrial adenocarcinoma  Per GYN-ONC w/ interval increase in tumor burden. Pt was treated with Anastrazole and initiated 3 weeks of megace 80mg BID followed by 3 weeks of tamoxifen.   - Pt was on tamoxifen and developed a left cephalic v. thrombus. Tamoxifen was dc'd on 5/17   - GYN-ONC following, appreciate further recs     #Cephalic vein thrombosis  - As above  - Pt on w/ Lovenox 60mg BID (5/17-). HOLD FOR TRACH TODAY and PEG tomorrow     FLUIDS/ELECTROLYTES/NUTRITION  -IVF: none  -Monitor, Replete to K>4 and Mg>2  -Diet: NPO for trach    PROPHYLAXIS  -DVT: Hold Lovenox and continue SCDs  -GI: none as on feeds    DISPO: MICU 60 yo F PMHx polio, breast cancer, DM, Endometrial Cancer s/p exploratory laparotomy, enterolysis, SHAMIR, BSO, pelvic lymphadenectomy, low anterior resection mobilization of splenic flexure, end colostomy on 7/30/18, rectovaginal fistula, numerous admissions for urinary tract infection presented to [facility] in the setting of urosepsis. Hospital course complicated by ESBL E coli bacteremia ( completed ertapenem on 5/14 ) and respiratory failure requiring multiple intubations 2/2 AIDP, now s/p trach and PEG ( 5/21), being treated for sepsis     CARDIOVASCULAR  #Hemodynamics  - Has had increasing levophed requirements. Unclear whether requirements are 2/2 sepsis or from autonomic dysfunction from AIDP   - Pt is mentating well, making good urine and has warm extremities    - Titrate MAP > 65     # r/o septic shock  - Pt has been spiking fevers the last couple of days w/ unclear source but with increased levophed requirements. Unclear whether requirements are 2/2 sepsis or from autonomic dysfunction from AIDP   - Will treat underlying sepsis 2/ vancomycin and meropenem ( See under ID) and continue to monitor levophed requirements    PULMONARY  #Acute hypercapnic respiratory failure   2/2 to demyelinating polyneuropathy (AIDP), patient has required 3 intubations this admission, now s/p trach (5/21)   - s/p 4 doses of IVIG finished 5/11 and 5 rounds of plasmapheresis ( finished 5/21)  -Continue w/ CPAP trial and possible trach collar if pt tolerates      #Hx of urinary retention/overflow incontinence   - [place] changed on 5/17     GI  # PEG placement  - Pt s/p PEG placement 5/21   - Will start feeds today at 3PM    Neurology  #AIDP  Patient w AIDP leading to respiratory compromise, previous EMG w demyelinating polyneuropathy  - s/p 4 rounds of IVIG and 5 rounds of plasmapheresis ( completed 5/21).  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 UA positive but UCx growing <4000 yeast  - CXR negative w/ no new infiltrates  - Fever most likely from left cephalic v. thrombus which is being treated   - Since pt otherwise clinically stable w/ low suspicion for underlying infection, will hold off on abx   -  BCx 5/17 NGTD. Repeat BCx 5/19 NGTD    HEME/ONC  # Anemia   - Hb 7.3 today; s/p 1u pRBC's 5/18  - If Hb continues to drop, will need CT abdomen/ pelvis to r/o RP bleed since pt is on Lovenox   - Maintain active T&S    #Stage IV endometrial adenocarcinoma  Per GYN-ONC w/ interval increase in tumor burden. Pt was treated with Anastrazole and initiated 3 weeks of megace 80mg BID followed by 3 weeks of tamoxifen.   - Pt was on tamoxifen and developed a left cephalic v. thrombus. Tamoxifen was dc'd on 5/17   - GYN-ONC following, appreciate further recs     #Cephalic vein thrombosis  - As above  - Pt on w/ Lovenox 60mg BID (5/17-). HOLD FOR TRACH TODAY and PEG tomorrow     FLUIDS/ELECTROLYTES/NUTRITION  -IVF: none  -Monitor, Replete to K>4 and Mg>2  -Diet: NPO for trach    PROPHYLAXIS  -DVT: Hold Lovenox and continue SCDs  -GI: none as on feeds    DISPO: MICU 60 yo F PMHx polio, breast cancer, DM, Endometrial Cancer s/p exploratory laparotomy, enterolysis, SHAMIR, BSO, pelvic lymphadenectomy, low anterior resection mobilization of splenic flexure, end colostomy on 7/30/18, rectovaginal fistula, numerous admissions for urinary tract infection presented to [facility] in the setting of urosepsis. Hospital course complicated by ESBL E coli bacteremia ( completed ertapenem on 5/14 ) and respiratory failure requiring multiple intubations 2/2 AIDP, now s/p trach and PEG ( 5/21), being treated for sepsis     CARDIOVASCULAR  #Hemodynamics  - Has had increasing levophed requirements. Unclear whether requirements are 2/2 sepsis or from autonomic dysfunction from AIDP   - Pt is mentating well, making good urine and has warm extremities    - Titrate MAP > 65     # r/o septic shock  - Pt has been spiking fevers the last couple of days w/ unclear source but with increased levophed requirements. Unclear whether requirements are 2/2 sepsis or from autonomic dysfunction from AIDP   - Will treat underlying sepsis 2/ vancomycin and meropenem ( See under ID) and continue to monitor levophed requirements    PULMONARY  #Acute hypercapnic respiratory failure   2/2 to demyelinating polyneuropathy (AIDP), patient has required 3 intubations this admission, now s/p trach (5/21)   - s/p 4 doses of IVIG finished 5/11 and 5 rounds of plasmapheresis ( finished 5/21)  -Continue w/ CPAP trial and possible trach collar if pt tolerates      #Hx of urinary retention/overflow incontinence   - [place] changed on 5/17     GI  # PEG placement  - Pt s/p PEG placement 5/21   - Will start feeds today at 3PM    Neurology  #AIDP  Patient w AIDP leading to respiratory compromise, previous EMG w demyelinating polyneuropathy  - s/p 4 rounds of IVIG and 5 rounds of plasmapheresis ( completed 5/21).  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 Appreciate further Neuro recs     INFECTIOUS DISEASE  # SEPSIS 2/2 unclear etiology   - Over the last week, pt had been spiking low grade fevers w/ negative workup ( no infiltrates on CXR, UCx growing <4000 yeast)   - Pt spiked to 101.6 this AM ( 5/22). Repeat CXR w/ no infiltrates. UA sent. Lactate normal  - Started on vancomycin 1gq12h and meropenem 1gq8h ( 5/22)  - Vanc trough at 5PM on 5/23   - BCx 5/19 NGTD. Repeat BCx sent. Will follow up results     #hx of recurrent ESBL ecoli UTI and bacteremia  - previously on admission patient admitted to MICU in septic shock 2/2 to ESBL ecoli bacteremia and UTI  - Completed tx w/ ertapenem on 5/14   - Repeat UCx w/ yeast     HEME/ONC  # Anemia   - Hb 8 today; s/p 1u pRBC's 5/18  - Maintain active T&S    #Stage IV endometrial adenocarcinoma  Per GYN-ONC w/ interval increase in tumor burden. Pt was treated with Anastrazole and initiated 3 weeks of megace 80mg BID followed by 3 weeks of tamoxifen.   - Pt was on tamoxifen and developed a left cephalic v. thrombus. Tamoxifen was dc'd on 5/17   - GYN-ONC following, appreciate further recs     #Cephalic vein thrombosis  - As above  - Was started on AC w/ Lovenox 40mg BID but given the location of thrombus, will d/c full AC   - Repeat US of LUE tomorrow to evaluate for worsening clot     FLUIDS/ELECTROLYTES/NUTRITION  -IVF: none  -Monitor, Replete to K>4 and Mg>2  -Diet: NPO till 3 P    PROPHYLAXIS  -DVT: Hold Lovenox till 3PM, SCDs  -GI: none as on feeds    DISPO: MICU 60 yo F PMHx polio, breast cancer, DM, Endometrial Cancer s/p exploratory laparotomy, enterolysis, SHAMIR, BSO, pelvic lymphadenectomy, low anterior resection mobilization of splenic flexure, end colostomy on 7/30/18, rectovaginal fistula, numerous admissions for urinary tract infection presented to [facility] in the setting of urosepsis. Hospital course complicated by ESBL E coli bacteremia ( completed ertapenem on 5/14 ) and respiratory failure requiring multiple intubations 2/2 AIDP, now s/p trach and PEG ( 5/21), being treated for sepsis     CARDIOVASCULAR  #Hemodynamics  - Has had increasing levophed requirements. Unclear whether requirements are 2/2 sepsis or from autonomic dysfunction from AIDP   - Pt is mentating well, making good urine and has warm extremities    - Titrate MAP > 65     # r/o septic shock  - Pt has been spiking fevers the last couple of days w/ unclear source but with increased levophed requirements. Unclear whether requirements are 2/2 sepsis or from autonomic dysfunction from AIDP   - Will treat underlying sepsis 2/ vancomycin and meropenem ( See under ID) and continue to monitor levophed requirements    PULMONARY  #Acute hypercapnic respiratory failure   2/2 to demyelinating polyneuropathy (AIDP), patient has required 3 intubations this admission, now s/p trach (5/21)   - s/p 4 doses of IVIG finished 5/11 and 5 rounds of plasmapheresis ( finished 5/21)  -Continue w/ CPAP trial and possible trach collar if pt tolerates      #Hx of urinary retention/overflow incontinence   - [place] changed on 5/17     GI  # PEG placement  - Pt s/p PEG placement 5/21   - Will start feeds today at 3PM    Neurology  #AIDP  Patient w AIDP leading to respiratory compromise, previous EMG w demyelinating polyneuropathy  - s/p 4 rounds of IVIG and 5 rounds of plasmapheresis ( completed 5/21).  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 Appreciate further Neuro recs     INFECTIOUS DISEASE  # SEPSIS 2/2 unclear etiology   - Over the last week, pt had been spiking low grade fevers w/ negative workup ( no infiltrates on CXR, UCx growing <4000 yeast)   - Pt spiked to 101.6 this AM ( 5/22). Repeat CXR w/ no infiltrates. UA sent. Lactate normal  - Started on vancomycin 1gq12h and meropenem 1gq8h ( 5/22)  - Vanc trough at 5PM on 5/23   - BCx 5/19 NGTD. Repeat BCx sent. Will follow up results     #hx of recurrent ESBL ecoli UTI and bacteremia  - previously on admission patient admitted to MICU in septic shock 2/2 to ESBL ecoli bacteremia and UTI  - Completed tx w/ ertapenem on 5/14   - Repeat UCx w/ yeast     HEME/ONC  # Anemia   - Hb 8 today; s/p 1u pRBC's 5/18  - Maintain active T&S    #Stage IV endometrial adenocarcinoma  Per GYN-ONC w/ interval increase in tumor burden. Pt was treated with Anastrazole and initiated 3 weeks of megace 80mg BID followed by 3 weeks of tamoxifen.   - Pt was on tamoxifen and developed a left cephalic v. thrombus. Tamoxifen was dc'd on 5/17   - GYN-ONC following, appreciate further recs     #Cephalic vein thrombosis  - As above  - Was started on AC w/ Lovenox 40mg BID but given the location of thrombus, will d/c full AC   - Repeat US of LUE tomorrow to evaluate for worsening clot     Endocrine  # r/o adrenal insufficiency  - Given increasing levophed requirements and possible autonomic dysfunction related to AIDP, tested pt for AM cortisol which was 18  - Ordered for cosyntropin stim test. Will follow up results     FLUIDS/ELECTROLYTES/NUTRITION  -IVF: none  -Monitor, Replete to K>4 and Mg>2  -Diet: NPO till 3 P    PROPHYLAXIS  -DVT: Hold Lovenox till 3PM, SCDs  -GI: none as on feeds    DISPO: MICU 60 yo F PMHx polio, breast cancer, DM, Endometrial Cancer s/p exploratory laparotomy, enterolysis, SHAMIR, BSO, pelvic lymphadenectomy, low anterior resection mobilization of splenic flexure, end colostomy on 7/30/18, rectovaginal fistula, numerous admissions for urinary tract infection presented to [facility] in the setting of urosepsis. Hospital course complicated by ESBL E coli bacteremia ( completed ertapenem on 5/14 ) and respiratory failure requiring multiple intubations 2/2 AIDP, now s/p trach and PEG ( 5/21), being treated for sepsis     CARDIOVASCULAR  #Hemodynamics  - Has had increasing levophed requirements. Unclear whether requirements are 2/2 sepsis or from autonomic dysfunction from AIDP   - Pt is mentating well, making good urine and has warm extremities    - Titrate MAP > 65     # r/o septic shock  - Pt has been spiking fevers the last couple of days w/ unclear source but with increased levophed requirements. Unclear whether requirements are 2/2 sepsis or from autonomic dysfunction from AIDP   - Will treat underlying sepsis 2/ vancomycin and meropenem ( See under ID) and continue to monitor levophed requirements    PULMONARY  #Acute hypercapnic respiratory failure   2/2 to demyelinating polyneuropathy (AIDP), patient has required 3 intubations this admission, now s/p trach (5/21)   - s/p 4 doses of IVIG finished 5/11 and 5 rounds of plasmapheresis ( finished 5/21)  -Continue w/ CPAP trial and possible trach collar if pt tolerates      #Hx of urinary retention/overflow incontinence   - [place] changed on 5/17     GI  # PEG placement  - Pt s/p PEG placement 5/21   - Will start feeds today at 3PM    Neurology  #AIDP  Patient w AIDP leading to respiratory compromise, previous EMG w demyelinating polyneuropathy  - s/p 4 rounds of IVIG and 5 rounds of plasmapheresis ( completed 5/21).  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 Appreciate further Neuro recs     INFECTIOUS DISEASE  # SEPSIS 2/2 unclear etiology   - Over the last week, pt had been spiking low grade fevers w/ negative workup ( no infiltrates on CXR, UCx growing <4000 yeast)   - Pt spiked to 101.6 this AM ( 5/22). Repeat CXR w/ no infiltrates. UA sent. Lactate normal  - Started on vancomycin 1gq12h and meropenem 1gq8h ( 5/22)  - Vanc trough at 5PM on 5/23   - BCx 5/19 NGTD. Repeat BCx sent. Will follow up results     #hx of recurrent ESBL ecoli UTI and bacteremia  - previously on admission patient admitted to MICU in septic shock 2/2 to ESBL ecoli bacteremia and UTI  - Completed tx w/ ertapenem on 5/14   - Repeat UCx w/ yeast     HEME/ONC  # Anemia   - Hb 8 today; s/p 1u pRBC's 5/18  - Maintain active T&S    #Stage IV endometrial adenocarcinoma  Per GYN-ONC w/ interval increase in tumor burden. Pt was treated with Anastrazole and initiated 3 weeks of megace 80mg BID followed by 3 weeks of tamoxifen.   - Pt was on tamoxifen and developed a left cephalic v. thrombus. Tamoxifen was dc'd on 5/17   - GYN-ONC following, appreciate further recs     #Cephalic vein thrombosis  - As above  - Was started on AC w/ Lovenox 60mg BID but given the location of thrombus, will d/c full AC   - Repeat US of LUE tomorrow to evaluate for worsening clot     Endocrine  # r/o adrenal insufficiency  - Given increasing levophed requirements and possible autonomic dysfunction related to AIDP, tested pt for AM cortisol which was 18  - Ordered for cosyntropin stim test. Will follow up results     FLUIDS/ELECTROLYTES/NUTRITION  -IVF: none  -Monitor, Replete to K>4 and Mg>2  -Diet: NPO till 3 P    PROPHYLAXIS  -DVT: Lovenox tonight, SCDs  -GI: none as on feeds    DISPO: MICU

## 2019-05-23 DIAGNOSIS — F33.2 MAJOR DEPRESSIVE DISORDER, RECURRENT SEVERE WITHOUT PSYCHOTIC FEATURES: ICD-10-CM

## 2019-05-23 LAB
-  CEFAZOLIN: SIGNIFICANT CHANGE UP
-  CLINDAMYCIN: SIGNIFICANT CHANGE UP
-  DAPTOMYCIN: SIGNIFICANT CHANGE UP
-  ERYTHROMYCIN: SIGNIFICANT CHANGE UP
-  LINEZOLID: SIGNIFICANT CHANGE UP
-  OXACILLIN: SIGNIFICANT CHANGE UP
-  PENICILLIN: SIGNIFICANT CHANGE UP
-  RIFAMPIN: SIGNIFICANT CHANGE UP
-  TETRACYCLINE: SIGNIFICANT CHANGE UP
-  TRIMETHOPRIM/SULFAMETHOXAZOLE: SIGNIFICANT CHANGE UP
ANION GAP SERPL CALC-SCNC: 11 MMOL/L — SIGNIFICANT CHANGE UP (ref 5–17)
APPEARANCE UR: CLEAR — SIGNIFICANT CHANGE UP
APTT BLD: 30.9 SEC — SIGNIFICANT CHANGE UP (ref 27.5–36.3)
BILIRUB UR-MCNC: NEGATIVE — SIGNIFICANT CHANGE UP
BUN SERPL-MCNC: 19 MG/DL — SIGNIFICANT CHANGE UP (ref 7–23)
CALCIUM SERPL-MCNC: 9.3 MG/DL — SIGNIFICANT CHANGE UP (ref 8.4–10.5)
CHLORIDE SERPL-SCNC: 100 MMOL/L — SIGNIFICANT CHANGE UP (ref 96–108)
CO2 SERPL-SCNC: 28 MMOL/L — SIGNIFICANT CHANGE UP (ref 22–31)
COLOR SPEC: YELLOW — SIGNIFICANT CHANGE UP
CREAT SERPL-MCNC: 0.48 MG/DL — LOW (ref 0.5–1.3)
CULTURE RESULTS: SIGNIFICANT CHANGE UP
DIFF PNL FLD: NEGATIVE — SIGNIFICANT CHANGE UP
FIBRINOGEN PPP-MCNC: 302 MG/DL — SIGNIFICANT CHANGE UP (ref 258–438)
GLUCOSE BLDC GLUCOMTR-MCNC: 142 MG/DL — HIGH (ref 70–99)
GLUCOSE BLDC GLUCOMTR-MCNC: 149 MG/DL — HIGH (ref 70–99)
GLUCOSE BLDC GLUCOMTR-MCNC: 164 MG/DL — HIGH (ref 70–99)
GLUCOSE BLDC GLUCOMTR-MCNC: 166 MG/DL — HIGH (ref 70–99)
GLUCOSE BLDC GLUCOMTR-MCNC: 179 MG/DL — HIGH (ref 70–99)
GLUCOSE BLDC GLUCOMTR-MCNC: 182 MG/DL — HIGH (ref 70–99)
GLUCOSE SERPL-MCNC: 164 MG/DL — HIGH (ref 70–99)
GLUCOSE UR QL: NEGATIVE — SIGNIFICANT CHANGE UP
HCT VFR BLD CALC: 21.9 % — LOW (ref 34.5–45)
HCT VFR BLD CALC: 22.6 % — LOW (ref 34.5–45)
HGB BLD-MCNC: 6.7 G/DL — CRITICAL LOW (ref 11.5–15.5)
HGB BLD-MCNC: 7 G/DL — CRITICAL LOW (ref 11.5–15.5)
INR BLD: 1.18 — HIGH (ref 0.88–1.16)
KETONES UR-MCNC: NEGATIVE — SIGNIFICANT CHANGE UP
LEUKOCYTE ESTERASE UR-ACNC: ABNORMAL
MAGNESIUM SERPL-MCNC: 2 MG/DL — SIGNIFICANT CHANGE UP (ref 1.6–2.6)
MCHC RBC-ENTMCNC: 28.1 PG — SIGNIFICANT CHANGE UP (ref 27–34)
MCHC RBC-ENTMCNC: 28.4 PG — SIGNIFICANT CHANGE UP (ref 27–34)
MCHC RBC-ENTMCNC: 30.6 GM/DL — LOW (ref 32–36)
MCHC RBC-ENTMCNC: 31 GM/DL — LOW (ref 32–36)
MCV RBC AUTO: 90.8 FL — SIGNIFICANT CHANGE UP (ref 80–100)
MCV RBC AUTO: 92.8 FL — SIGNIFICANT CHANGE UP (ref 80–100)
METHOD TYPE: SIGNIFICANT CHANGE UP
NITRITE UR-MCNC: NEGATIVE — SIGNIFICANT CHANGE UP
NRBC # BLD: 0 /100 WBCS — SIGNIFICANT CHANGE UP (ref 0–0)
NRBC # BLD: 0 /100 WBCS — SIGNIFICANT CHANGE UP (ref 0–0)
PH UR: 5.5 — SIGNIFICANT CHANGE UP (ref 5–8)
PLATELET # BLD AUTO: 347 K/UL — SIGNIFICANT CHANGE UP (ref 150–400)
PLATELET # BLD AUTO: 361 K/UL — SIGNIFICANT CHANGE UP (ref 150–400)
POTASSIUM SERPL-MCNC: 3.4 MMOL/L — LOW (ref 3.5–5.3)
POTASSIUM SERPL-SCNC: 3.4 MMOL/L — LOW (ref 3.5–5.3)
PROT UR-MCNC: 30 MG/DL
PROTHROM AB SERPL-ACNC: 13.4 SEC — HIGH (ref 10–12.9)
RBC # BLD: 2.36 M/UL — LOW (ref 3.8–5.2)
RBC # BLD: 2.49 M/UL — LOW (ref 3.8–5.2)
RBC # FLD: 16.2 % — HIGH (ref 10.3–14.5)
RBC # FLD: 16.2 % — HIGH (ref 10.3–14.5)
SODIUM SERPL-SCNC: 139 MMOL/L — SIGNIFICANT CHANGE UP (ref 135–145)
SP GR SPEC: 1.02 — SIGNIFICANT CHANGE UP (ref 1–1.03)
SPECIMEN SOURCE: SIGNIFICANT CHANGE UP
UROBILINOGEN FLD QL: 1 E.U./DL — SIGNIFICANT CHANGE UP
VANCOMYCIN TROUGH SERPL-MCNC: 26.5 UG/ML — CRITICAL HIGH (ref 10–20)
VANCOMYCIN TROUGH SERPL-MCNC: 49.7 UG/ML — CRITICAL HIGH (ref 10–20)
WBC # BLD: 16.79 K/UL — HIGH (ref 3.8–10.5)
WBC # BLD: 16.91 K/UL — HIGH (ref 3.8–10.5)
WBC # FLD AUTO: 16.79 K/UL — HIGH (ref 3.8–10.5)
WBC # FLD AUTO: 16.91 K/UL — HIGH (ref 3.8–10.5)

## 2019-05-23 PROCEDURE — 99356: CPT

## 2019-05-23 PROCEDURE — 99233 SBSQ HOSP IP/OBS HIGH 50: CPT

## 2019-05-23 PROCEDURE — 99223 1ST HOSP IP/OBS HIGH 75: CPT

## 2019-05-23 PROCEDURE — 71045 X-RAY EXAM CHEST 1 VIEW: CPT | Mod: 26

## 2019-05-23 PROCEDURE — 93971 EXTREMITY STUDY: CPT | Mod: 26,LT

## 2019-05-23 PROCEDURE — 99291 CRITICAL CARE FIRST HOUR: CPT

## 2019-05-23 PROCEDURE — 99497 ADVNCD CARE PLAN 30 MIN: CPT | Mod: 25

## 2019-05-23 RX ORDER — POTASSIUM CHLORIDE 20 MEQ
40 PACKET (EA) ORAL
Refills: 0 | Status: COMPLETED | OUTPATIENT
Start: 2019-05-23 | End: 2019-05-23

## 2019-05-23 RX ORDER — LIDOCAINE HCL 20 MG/ML
50 VIAL (ML) INJECTION ONCE
Refills: 0 | Status: DISCONTINUED | OUTPATIENT
Start: 2019-05-23 | End: 2019-05-24

## 2019-05-23 RX ORDER — SODIUM CHLORIDE 9 MG/ML
500 INJECTION INTRAMUSCULAR; INTRAVENOUS; SUBCUTANEOUS ONCE
Refills: 0 | Status: COMPLETED | OUTPATIENT
Start: 2019-05-23 | End: 2019-05-23

## 2019-05-23 RX ORDER — INSULIN GLARGINE 100 [IU]/ML
8 INJECTION, SOLUTION SUBCUTANEOUS AT BEDTIME
Refills: 0 | Status: DISCONTINUED | OUTPATIENT
Start: 2019-05-23 | End: 2019-06-03

## 2019-05-23 RX ORDER — SODIUM CHLORIDE 9 MG/ML
10 INJECTION INTRAMUSCULAR; INTRAVENOUS; SUBCUTANEOUS
Refills: 0 | Status: DISCONTINUED | OUTPATIENT
Start: 2019-05-23 | End: 2019-06-19

## 2019-05-23 RX ORDER — ACETAMINOPHEN 500 MG
650 TABLET ORAL ONCE
Refills: 0 | Status: COMPLETED | OUTPATIENT
Start: 2019-05-23 | End: 2019-05-23

## 2019-05-23 RX ORDER — CHLORHEXIDINE GLUCONATE 213 G/1000ML
1 SOLUTION TOPICAL
Refills: 0 | Status: DISCONTINUED | OUTPATIENT
Start: 2019-05-24 | End: 2019-05-24

## 2019-05-23 RX ORDER — ALPRAZOLAM 0.25 MG
0.5 TABLET ORAL EVERY 8 HOURS
Refills: 0 | Status: DISCONTINUED | OUTPATIENT
Start: 2019-05-23 | End: 2019-05-24

## 2019-05-23 RX ORDER — NOREPINEPHRINE BITARTRATE/D5W 8 MG/250ML
0.05 PLASTIC BAG, INJECTION (ML) INTRAVENOUS
Qty: 8 | Refills: 0 | Status: DISCONTINUED | OUTPATIENT
Start: 2019-05-23 | End: 2019-05-24

## 2019-05-23 RX ORDER — ACETAMINOPHEN 500 MG
1000 TABLET ORAL EVERY 8 HOURS
Refills: 0 | Status: DISCONTINUED | OUTPATIENT
Start: 2019-05-23 | End: 2019-06-19

## 2019-05-23 RX ORDER — ALPRAZOLAM 0.25 MG
0.5 TABLET ORAL ONCE
Refills: 0 | Status: DISCONTINUED | OUTPATIENT
Start: 2019-05-23 | End: 2019-05-23

## 2019-05-23 RX ORDER — FLUDROCORTISONE ACETATE 0.1 MG/1
0.1 TABLET ORAL EVERY 24 HOURS
Refills: 0 | Status: DISCONTINUED | OUTPATIENT
Start: 2019-05-23 | End: 2019-06-19

## 2019-05-23 RX ORDER — IBUPROFEN 200 MG
1 TABLET ORAL
Qty: 2 | Refills: 0
Start: 2019-05-23

## 2019-05-23 RX ADMIN — SENNA PLUS 2 TABLET(S): 8.6 TABLET ORAL at 22:50

## 2019-05-23 RX ADMIN — NYSTATIN CREAM 1 APPLICATION(S): 100000 CREAM TOPICAL at 07:13

## 2019-05-23 RX ADMIN — ENOXAPARIN SODIUM 40 MILLIGRAM(S): 100 INJECTION SUBCUTANEOUS at 22:51

## 2019-05-23 RX ADMIN — Medication 1 APPLICATION(S): at 00:26

## 2019-05-23 RX ADMIN — FLUDROCORTISONE ACETATE 0.1 MILLIGRAM(S): 0.1 TABLET ORAL at 09:38

## 2019-05-23 RX ADMIN — Medication 650 MILLIGRAM(S): at 00:23

## 2019-05-23 RX ADMIN — Medication 650 MILLIGRAM(S): at 07:09

## 2019-05-23 RX ADMIN — ZINC OXIDE 1 APPLICATION(S): 200 OINTMENT TOPICAL at 07:22

## 2019-05-23 RX ADMIN — INSULIN GLARGINE 8 UNIT(S): 100 INJECTION, SOLUTION SUBCUTANEOUS at 22:51

## 2019-05-23 RX ADMIN — Medication 1 APPLICATION(S): at 07:12

## 2019-05-23 RX ADMIN — Medication 100 MILLIGRAM(S): at 11:54

## 2019-05-23 RX ADMIN — Medication 250 MILLIGRAM(S): at 07:11

## 2019-05-23 RX ADMIN — MIDODRINE HYDROCHLORIDE 15 MILLIGRAM(S): 2.5 TABLET ORAL at 07:11

## 2019-05-23 RX ADMIN — Medication 1000 MILLIGRAM(S): at 11:09

## 2019-05-23 RX ADMIN — NYSTATIN CREAM 1 APPLICATION(S): 100000 CREAM TOPICAL at 18:00

## 2019-05-23 RX ADMIN — CHLORHEXIDINE GLUCONATE 15 MILLILITER(S): 213 SOLUTION TOPICAL at 23:12

## 2019-05-23 RX ADMIN — INSULIN HUMAN 2: 100 INJECTION, SOLUTION SUBCUTANEOUS at 23:53

## 2019-05-23 RX ADMIN — INSULIN HUMAN 2: 100 INJECTION, SOLUTION SUBCUTANEOUS at 12:14

## 2019-05-23 RX ADMIN — MIDODRINE HYDROCHLORIDE 15 MILLIGRAM(S): 2.5 TABLET ORAL at 22:50

## 2019-05-23 RX ADMIN — Medication 500 MILLIGRAM(S): at 11:07

## 2019-05-23 RX ADMIN — Medication 1 APPLICATION(S): at 22:55

## 2019-05-23 RX ADMIN — PANTOPRAZOLE SODIUM 40 MILLIGRAM(S): 20 TABLET, DELAYED RELEASE ORAL at 11:54

## 2019-05-23 RX ADMIN — CHLORHEXIDINE GLUCONATE 15 MILLILITER(S): 213 SOLUTION TOPICAL at 09:38

## 2019-05-23 RX ADMIN — SODIUM CHLORIDE 1000 MILLILITER(S): 9 INJECTION INTRAMUSCULAR; INTRAVENOUS; SUBCUTANEOUS at 06:11

## 2019-05-23 RX ADMIN — MEROPENEM 100 MILLIGRAM(S): 1 INJECTION INTRAVENOUS at 07:24

## 2019-05-23 RX ADMIN — MIDODRINE HYDROCHLORIDE 15 MILLIGRAM(S): 2.5 TABLET ORAL at 15:08

## 2019-05-23 RX ADMIN — ZINC OXIDE 1 APPLICATION(S): 200 OINTMENT TOPICAL at 22:55

## 2019-05-23 RX ADMIN — Medication 1000 MILLIGRAM(S): at 10:45

## 2019-05-23 RX ADMIN — ZINC OXIDE 1 APPLICATION(S): 200 OINTMENT TOPICAL at 00:25

## 2019-05-23 RX ADMIN — ZINC OXIDE 1 APPLICATION(S): 200 OINTMENT TOPICAL at 15:08

## 2019-05-23 RX ADMIN — Medication 0.5 MILLIGRAM(S): at 16:45

## 2019-05-23 RX ADMIN — INSULIN HUMAN 2: 100 INJECTION, SOLUTION SUBCUTANEOUS at 00:25

## 2019-05-23 RX ADMIN — Medication 40 MILLIEQUIVALENT(S): at 09:41

## 2019-05-23 RX ADMIN — CHLORHEXIDINE GLUCONATE 1 APPLICATION(S): 213 SOLUTION TOPICAL at 07:13

## 2019-05-23 RX ADMIN — Medication 650 MILLIGRAM(S): at 08:47

## 2019-05-23 RX ADMIN — Medication 1 APPLICATION(S): at 15:08

## 2019-05-23 RX ADMIN — Medication 40 MILLIEQUIVALENT(S): at 07:23

## 2019-05-23 NOTE — PROGRESS NOTE ADULT - SUBJECTIVE AND OBJECTIVE BOX
Pt seen and examined at bedside. She does not like the tracheostomy and is upset.   Pt denies fever, chills, chest pain, SOB, nausea, vomiting, lightheadedness, or dizziness.      T(F): 98.5 (05-23-19 @ 14:23), Max: 100.4 (05-22-19 @ 17:55)  HR: 73 (05-23-19 @ 12:58) (64 - 90)  BP: 90/50 (05-23-19 @ 12:00) (84/54 - 162/72)  RR: 28 (05-23-19 @ 12:58) (12 - 48)  SpO2: 100% (05-23-19 @ 12:58) (100% - 100%)  Wt(kg): --  I&O's Summary    22 May 2019 07:01  -  23 May 2019 07:00  --------------------------------------------------------  IN: 2448 mL / OUT: 850 mL / NET: 1598 mL    23 May 2019 07:01  -  23 May 2019 14:25  --------------------------------------------------------  IN: 258.5 mL / OUT: 220 mL / NET: 38.5 mL    MEDICATIONS  (STANDING):  ascorbic acid 500 milliGRAM(s) Oral daily  calcium gluconate IVPB 1 Gram(s) IV Intermittent once  chlorhexidine 0.12% Liquid 15 milliLiter(s) Oral Mucosa every 12 hours  chlorhexidine 2% Cloths 1 Application(s) Topical <User Schedule>  dextrose 5%. 1000 milliLiter(s) (50 mL/Hr) IV Continuous <Continuous>  dextrose 50% Injectable 25 Gram(s) IV Push once  docusate sodium Liquid 100 milliGRAM(s) Oral daily  enoxaparin Injectable 40 milliGRAM(s) SubCutaneous every 24 hours  fludroCORTISONE 0.1 milliGRAM(s) Oral every 24 hours  Heparin 1000 Units / ml 2 milliLiter(s) 2 milliLiter(s) IV Push once  insulin glargine Injectable (LANTUS) 8 Unit(s) SubCutaneous at bedtime  insulin regular  human corrective regimen sliding scale   SubCutaneous every 6 hours  midodrine 15 milliGRAM(s) Oral every 8 hours  norepinephrine Infusion 0.05 MICROgram(s)/kG/Min (5.466 mL/Hr) IV Continuous <Continuous>  nystatin Cream 1 Application(s) Topical two times a day  pantoprazole   Suspension 40 milliGRAM(s) Oral daily  petrolatum Ophthalmic Ointment 1 Application(s) Both EYES three times a day  senna 2 Tablet(s) Oral at bedtime  zinc oxide 20% Ointment 1 Application(s) Topical three times a day    MEDICATIONS  (PRN):  acetaminophen    Suspension .. 1000 milliGRAM(s) Oral every 8 hours PRN Moderate Pain (4 - 6)  dextrose 40% Gel 15 Gram(s) Oral once PRN Blood Glucose LESS THAN 70 milliGRAM(s)/deciliter  glucagon  Injectable 1 milliGRAM(s) IntraMuscular once PRN Glucose LESS THAN 70 milligrams/deciliter    Physical Exam:  Gen: No Acute Distress  Pulm: trach in place, normal work of breathing  GI: soft, nontender, no rebound, no guarding.  PEG and ostomy in place  Ext: SCDs in place, wnl; 4/5 strength in upper extremities  [place]: in place     LABS:                        6.7    16.91 )-----------( 347      ( 23 May 2019 12:34 )             21.9     05-23    139  |  100  |  19  ----------------------------<  164<H>  3.4<L>   |  28  |  0.48<L>    Ca    9.3      23 May 2019 06:22  Phos  4.4     05-22  Mg     2.0     05-23    TPro  5.9<L>  /  Alb  4.6  /  TBili  1.0  /  DBili  x   /  AST  12  /  ALT  <5<L>  /  AlkPhos  26<L>  05-22    PT/INR - ( 23 May 2019 06:22 )   PT: 13.4 sec;   INR: 1.18        PTT - ( 23 May 2019 06:22 )  PTT:30.9 sec

## 2019-05-23 NOTE — CONSULT NOTE ADULT - SUBJECTIVE AND OBJECTIVE BOX
Vascular Access Service Consult Note    59yFemaleHEAL ISSUES - PROBLEM Dx:  Fever: Fever  Chronic respiratory failure  Gram-negative bacteremia: Gram-negative bacteremia  Acute inflammatory demyelinating polyneuropathy: Acute inflammatory demyelinating polyneuropathy  Advance care planning: Advance care planning  Acute respiratory failure requiring reintubation: Acute respiratory failure requiring reintubation  Transition of care performed with sharing of clinical summary: Transition of care performed with sharing of clinical summary  Septic shock due to Escherichia coli: Septic shock due to Escherichia coli  Acute hypercapnic respiratory failure: Acute hypercapnic respiratory failure  Endometrial adenocarcinoma: Endometrial adenocarcinoma  Post-polio syndrome: Post-polio syndrome  Prophylactic measure: Prophylactic measure  History of breast cancer: History of breast cancer  Urinary retention: Urinary retention  Ovarian malignant neoplasm: Ovarian malignant neoplasm  Sepsis secondary to UTI: Sepsis secondary to UTI  CIDP (chronic inflammatory demyelinating polyneuropathy): CIDP (chronic inflammatory demyelinating polyneuropathy)  Ventricular tachycardia, non-sustained: Ventricular tachycardia, non-sustained  Hypomagnesemia: Hypomagnesemia  Ventricular tachycardia: Ventricular tachycardia  Enterovaginal fistula: Enterovaginal fistula  Type 2 diabetes mellitus without complication, without long-term current use of insulin: Type 2 diabetes mellitus without complication, without long-term current use of insulin  Pulmonary hypertension: Pulmonary hypertension  Atelectasis: Atelectasis  Pleural effusion: Pleural effusion  Pyelonephritis: Pyelonephritis  Bacteremia: Bacteremia  Sepsis, due to unspecified organism: Sepsis, due to unspecified organism  Endometrial carcinoma: Endometrial carcinoma  Urinary tract infection: Urinary tract infection  Palliative care by specialist: Palliative care by specialist  Goals of care, counseling/discussion: Goals of care, counseling/discussion  Debility: Debility  Neurogenic bladder: Neurogenic bladder  Fistula: Fistula  Malignant neoplasm: Malignant neoplasm  Acute hypoxemic respiratory failure: Acute hypoxemic respiratory failure  Septic shock: Septic shock             Diagnosis: sepsis    Indications for Vascular Access (Check all that apply)  [ X ]  Antibiotic Therapy       Antibiotic Prescribed: vanco                                                                            Expected Duration of Therapy:               [  ]  IV Hydration  [  ]  Total Parenteral Nutrition  [  ]  Chemotherapy  [  X]  Difficult Venous Access  [  ]  CVP monitoring  [  ]  Medications with high potential for tissue necrosis on extravasation  [  ]  Other    Screening (Check all that apply)  Previous Radiation to chest  [ X ] Yes      [  ]  No  Breast Cancer                          [X  ] Left     [  ]  Right    [  ]  No  Lymph Node Dissection         [X  ] Left     [  ]  Right    [  ]  No  Pacemaker or ICD                   [  ] Left     [  ]  Right    [  X]  No  Upper Extremity DVT             [X ] Left     [  ]  Right    [  ]  No  Chronic Kidney Disease         [  ]  Yes     [  X]  No  Hemodialysis                           [X  ]  Yes     [  ]  No  AV Fistula/ Graft                     [  ]  Left    [  ]  Right    [ X ]  No  Temp>101F in past 24 H       [  ]  Yes     [X  ]  No  H/O PICC/Midline                   [X  ]  Yes     [  ]  No    Lab data:                        6.7    16.91 )-----------( 347      ( 23 May 2019 12:34 )             21.9     05-23    139  |  100  |  19  ----------------------------<  164<H>  3.4<L>   |  28  |  0.48<L>    Ca    9.3      23 May 2019 06:22  Phos  4.4     05-22  Mg     2.0     05-23    TPro  5.9<L>  /  Alb  4.6  /  TBili  1.0  /  DBili  x   /  AST  12  /  ALT  <5<L>  /  AlkPhos  26<L>  05-22    PT/INR - ( 23 May 2019 06:22 )   PT: 13.4 sec;   INR: 1.18          PTT - ( 23 May 2019 06:22 )  PTT:30.9 sec  Culture Results:   50,000 CFU/ml Enterococcus species  Identification and susceptibility to follow.  50,000 CFU/ml Yeast  Culture in progress (05-22 @ 10:51)  Culture Results:   Rare Staphylococcus aureus  Susceptibility to follow.  No routine respiratory fani Isolated (05-22 @ 10:46)  Culture Results:   No growth at 1 day. (05-22 @ 08:29)  Culture Results:   No growth at 1 day. (05-22 @ 08:29)  Culture Results:   Testing in progress (05-22 @ 02:20)          I have reviewed the chart, interviewed and examined the patient and determined that this patient:  [ X ] Is a candidate for a PICC line  [  ] Is a candidate for a Midline  [  ] Is not a candidate for vascular access device (reason)    Lumens:    [  ] Single  [X  ] Double

## 2019-05-23 NOTE — PROGRESS NOTE ADULT - SUBJECTIVE AND OBJECTIVE BOX
GYN Progress Note    Patient seen at bedside for AM rounds.  Has been afebrile since yesterday evening.  Decreasing pressor requirements - stable at 6U over past few hours (from 15 yesterday).  Enteral feeds were started through her PEG.      Pt still unable to talk due to trach, however indicates that she is feeling better with the trach in place.  Self suctioning for secretions.  She does indicate some discomfort around the trach which currently has some gauze in place for cushioning.      Vital Signs Last 24 Hrs  T(C): 36.5 (23 May 2019 05:15), Max: 38.2 (22 May 2019 09:43)  T(F): 97.7 (23 May 2019 05:15), Max: 100.8 (22 May 2019 09:43)  HR: 70 (23 May 2019 07:00) (64 - 96)  BP: 121/86 (23 May 2019 07:00) (84/54 - 162/72)  BP(mean): 97 (23 May 2019 07:00) (58 - 111)  RR: 14 (23 May 2019 07:00) (12 - 48)  SpO2: 100% (23 May 2019 07:00) (99% - 100%)    Physical Exam:  Gen: No Acute Distress  Pulm: trach in place, normal work of breathing  GI: soft, nontender, no rebound, no guarding.  PEG and ostomy in place  Ext: SCDs in place, wnl; 4/5 strength in upper extremities  [place]: in place, turbid    I&O's Summary    22 May 2019 07:01  -  23 May 2019 07:00  --------------------------------------------------------  IN: 1766 mL / OUT: 800 mL / NET: 966 mL      MEDICATIONS  (STANDING):  albumin human  5% IVPB 2750 milliLiter(s) IV Intermittent once  ascorbic acid 500 milliGRAM(s) Oral daily  calcium gluconate IVPB 1 Gram(s) IV Intermittent once  chlorhexidine 0.12% Liquid 15 milliLiter(s) Oral Mucosa every 12 hours  chlorhexidine 2% Cloths 1 Application(s) Topical <User Schedule>  dextrose 5%. 1000 milliLiter(s) (50 mL/Hr) IV Continuous <Continuous>  dextrose 50% Injectable 25 Gram(s) IV Push once  docusate sodium Liquid 100 milliGRAM(s) Oral daily  enoxaparin Injectable 40 milliGRAM(s) SubCutaneous every 24 hours  fludroCORTISONE 0.1 milliGRAM(s) Oral every 24 hours  Heparin 1000 Units / ml 2 milliLiter(s) 2 milliLiter(s) IV Push once  insulin regular  human corrective regimen sliding scale   SubCutaneous every 6 hours  meropenem  IVPB 1000 milliGRAM(s) IV Intermittent every 8 hours  midodrine 15 milliGRAM(s) Oral every 8 hours  norepinephrine Infusion 0.05 MICROgram(s)/kG/Min (5.466 mL/Hr) IV Continuous <Continuous>  nystatin Cream 1 Application(s) Topical two times a day  pantoprazole   Suspension 40 milliGRAM(s) Oral daily  petrolatum Ophthalmic Ointment 1 Application(s) Both EYES three times a day  potassium chloride   Powder 40 milliEquivalent(s) Oral every 2 hours  senna 2 Tablet(s) Oral at bedtime  vancomycin  IVPB 1000 milliGRAM(s) IV Intermittent every 12 hours  zinc oxide 20% Ointment 1 Application(s) Topical three times a day    MEDICATIONS  (PRN):  dextrose 40% Gel 15 Gram(s) Oral once PRN Blood Glucose LESS THAN 70 milliGRAM(s)/deciliter  glucagon  Injectable 1 milliGRAM(s) IntraMuscular once PRN Glucose LESS THAN 70 milligrams/deciliter      LABS:                        7.0    16.79 )-----------( 361      ( 23 May 2019 06:22 )             22.6     05-23    139  |  100  |  19  ----------------------------<  164<H>  3.4<L>   |  28  |  0.48<L>    Ca    9.3      23 May 2019 06:22  Phos  4.4     05-22  Mg     2.0     05-23    TPro  5.9<L>  /  Alb  4.6  /  TBili  1.0  /  DBili  x   /  AST  12  /  ALT  <5<L>  /  AlkPhos  26<L>  05-22    PT/INR - ( 23 May 2019 06:22 )   PT: 13.4 sec;   INR: 1.18          PTT - ( 23 May 2019 06:22 )  PTT:30.9 sec

## 2019-05-23 NOTE — CHART NOTE - NSCHARTNOTEFT_GEN_A_CORE
Advance care planning meeting  Start time: 4 PM  End time:   4:30 PM  Total time: 30 minutes    A face to face meeting to discuss advance care planning was held today regarding: TIM MCDANIEL  Primary decision maker: Patient herself  Alternate/surrogate:  sister Esha Avila    Also in attendance, Dr. Gonzalez, Dr. Steinberg, Dr. Sellers, Yasmine Santoyo MA, patient nephew   Discussed advance directives, future plans, ventilator dependence, patient's desire to go home, recurrent infection, neurologic disability  Decision regarding code status: Discussed possibility of forgoing ventilator  Discussed possibility of placement in a long term care facility  Documentation completed today: none  No specific decision made, Patient and family very tearful

## 2019-05-23 NOTE — CHART NOTE - NSCHARTNOTEFT_GEN_A_CORE
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 Provides: 1056mL TV, 1367kcal, 74g protein, 866mL free H2O, 106% RDI, 1.55g/kg IBW protein, 22.5 non pro kcal    PO Intake: Good (%) [   ]  Fair (50-75%) [   ] Poor (<25%) [   ]- N/A NPO w/EN    GI Issues: No N/V/C/D reported at this time. Colostomy w/50cc output this morning     Pain: Some pain endorsed at PEG site and at LUE      Skin Integrity:  Sacrum stage II pressure injury   Micky 12  Rash    Labs:   05-23    139  |  100  |  19  ----------------------------<  164<H>  3.4<L>   |  28  |  0.48<L>    Ca    9.3      23 May 2019 06:22  Phos  4.4     05-22  Mg     2.0     05-23    TPro  5.9<L>  /  Alb  4.6  /  TBili  1.0  /  DBili  x   /  AST  12  /  ALT  <5<L>  /  AlkPhos  26<L>  05-22    CAPILLARY BLOOD GLUCOSE      POCT Blood Glucose.: 179 mg/dL (23 May 2019 12:10)  POCT Blood Glucose.: 149 mg/dL (23 May 2019 07:35)  POCT Blood Glucose.: 166 mg/dL (23 May 2019 06:12)  POCT Blood Glucose.: 182 mg/dL (23 May 2019 00:00)  POCT Blood Glucose.: 200 mg/dL (22 May 2019 17:14)      Medications:  MEDICATIONS  (STANDING):  ascorbic acid 500 milliGRAM(s) Oral daily  calcium gluconate IVPB 1 Gram(s) IV Intermittent once  chlorhexidine 0.12% Liquid 15 milliLiter(s) Oral Mucosa every 12 hours  chlorhexidine 2% Cloths 1 Application(s) Topical <User Schedule>  dextrose 5%. 1000 milliLiter(s) (50 mL/Hr) IV Continuous <Continuous>  dextrose 50% Injectable 25 Gram(s) IV Push once  docusate sodium Liquid 100 milliGRAM(s) Oral daily  enoxaparin Injectable 40 milliGRAM(s) SubCutaneous every 24 hours  fludroCORTISONE 0.1 milliGRAM(s) Oral every 24 hours  Heparin 1000 Units / ml 2 milliLiter(s) 2 milliLiter(s) IV Push once  insulin glargine Injectable (LANTUS) 8 Unit(s) SubCutaneous at bedtime  insulin regular  human corrective regimen sliding scale   SubCutaneous every 6 hours  midodrine 15 milliGRAM(s) Oral every 8 hours  norepinephrine Infusion 0.05 MICROgram(s)/kG/Min (5.466 mL/Hr) IV Continuous <Continuous>  nystatin Cream 1 Application(s) Topical two times a day  pantoprazole   Suspension 40 milliGRAM(s) Oral daily  petrolatum Ophthalmic Ointment 1 Application(s) Both EYES three times a day  senna 2 Tablet(s) Oral at bedtime  zinc oxide 20% Ointment 1 Application(s) Topical three times a day    MEDICATIONS  (PRN):  acetaminophen    Suspension .. 1000 milliGRAM(s) Oral every 8 hours PRN Moderate Pain (4 - 6)  dextrose 40% Gel 15 Gram(s) Oral once PRN Blood Glucose LESS THAN 70 milliGRAM(s)/deciliter  glucagon  Injectable 1 milliGRAM(s) IntraMuscular once PRN Glucose LESS THAN 70 milligrams/deciliter      Weight: 58.3kg (4/30)  56.2kg (5/9)    Weight Change:   2kg weight loss since admit; needs being met inconsistently     Estimated energy needs:   Ideal body weight (47.7kg) used for calculations as pt >120% of IBW.   Nutrient needs based on [facility] standards of care for maintenance in older adults.   needs adjusted for age, PU stage 2, catabolic illness, vent  1190-1428kcal/day (25-30kcal/kg)  67-76g pro/day (1.4-1.6g/kg)  1428-1666ml fluid/day (30-35ml/kg)    Subjective:   53yo F known to this RD from prior admission with known stage IV endometrial cancer, likely with progression of disease, complex fistulae, chronic indwelling [place] admitted with fever and urosepsis. Pt intubated 2/2 acute hypoxic respiratory failure, and successfully extubated on 5/2. Pt transferred to [facility], and started on PO diet. Required reintubation on 5/8 2/2 CO2 narcosis likely d/t respiratory muscle insufficiency/AIDP. S/p IVIG and s/p LP. Pt extubated on 5/10, though reintubated on 5/13 for CO2 narcosis. Completed 5 treatments of plasmapheresis; last on 5/21. Pt was initially declining trach/PEG and was going to be extubated, though she changed her mind and trach/PEG placed on 5/21. Pt seen in room and discussed during MICU rounds. Pt awake, alert, trached to vent on VC/AC mode. Placed on CPAP trial while in room. Not sedated at this time. MAP 96- levophed being titrated off. EN running at 30mL/hr- RN aware to bring up to goal. Pt w/no complaints of N/V; ostomy w/small output. Pain at PEG site, and headache- team aware. Planned for multidisciplinary team meeting (GYN, neuro, MICU) w/pt and family today to discuss future care plans/options. Will continue to follow per RD protocol.     Previous Nutrition Diagnosis:  Increased nutrient needs RT increased demand for kcal/pro AEB wt loss, pressure ulcer, catabolic illness    Active [  X ]  Resolved [   ]    If resolved, new PES:     Goal:  Pt to consistently meet % of estimated needs via tolerated route     Recommendations:  1. Continue with current EN order. Please continue to follow VBF protocol  2. Monitor for s/s intolerance; maintain aspiration precautions at all times   3. Monitor lytes and replete prn. POC BG Q6hrs   4. Pain and bowel regimens per team     Education:   Discussed purpose of TF and long-term goal of PO intake     Risk Level: High [  X ] Moderate [   ] Low [   ]

## 2019-05-23 NOTE — PROGRESS NOTE ADULT - ASSESSMENT
58 yo F PMHx polio, breast cancer, DM, Endometrial Cancer s/p exploratory laparotomy, enterolysis, SHAMIR, BSO, pelvic lymphadenectomy, low anterior resection mobilization of splenic flexure, end colostomy on 7/30/18, rectovaginal fistula, numerous admissions for urinary tract infection presented to [facility] in the setting of urosepsis. Hospital course complicated by ESBL E coli bacteremia ( completed ertapenem on 5/14 ) and respiratory failure requiring multiple intubations 2/2 AIDP, now s/p trach and PEG ( 5/21), being treated for sepsis     CARDIOVASCULAR  #Hemodynamics  - Has had increasing levophed requirements. Unclear whether requirements are 2/2 sepsis or from autonomic dysfunction from AIDP   - Pt is mentating well, making good urine and has warm extremities    - Titrate MAP > 65     # r/o septic shock  - Pt has been spiking fevers the last couple of days w/ unclear source but with increased levophed requirements. Unclear whether requirements are 2/2 sepsis or from autonomic dysfunction from AIDP   - Will treat underlying sepsis 2/ vancomycin and meropenem ( See under ID) and continue to monitor levophed requirements    PULMONARY  #Acute hypercapnic respiratory failure   2/2 to demyelinating polyneuropathy (AIDP), patient has required 3 intubations this admission, now s/p trach (5/21)   - s/p 4 doses of IVIG finished 5/11 and 5 rounds of plasmapheresis ( finished 5/21)  -Continue w/ CPAP trial and possible trach collar if pt tolerates      #Hx of urinary retention/overflow incontinence   - [place] changed on 5/17     GI  # PEG placement  - Pt s/p PEG placement 5/21   - Will start feeds today at 3PM    Neurology  #AIDP  Patient w AIDP leading to respiratory compromise, previous EMG w demyelinating polyneuropathy  - s/p 4 rounds of IVIG and 5 rounds of plasmapheresis ( completed 5/21).  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 Appreciate further Neuro recs     INFECTIOUS DISEASE  # SEPSIS 2/2 unclear etiology   - Over the last week, pt had been spiking low grade fevers w/ negative workup ( no infiltrates on CXR, UCx growing <4000 yeast)   - Pt spiked to 101.6 this AM ( 5/22). Repeat CXR w/ no infiltrates. UA sent. Lactate normal  - Started on vancomycin 1gq12h and meropenem 1gq8h ( 5/22)  - Vanc trough at 5PM on 5/23   - BCx 5/19 NGTD. Repeat BCx sent. Will follow up results     #hx of recurrent ESBL ecoli UTI and bacteremia  - previously on admission patient admitted to MICU in septic shock 2/2 to ESBL ecoli bacteremia and UTI  - Completed tx w/ ertapenem on 5/14   - Repeat UCx w/ yeast     HEME/ONC  # Anemia   - Hb 8 today; s/p 1u pRBC's 5/18  - Maintain active T&S    #Stage IV endometrial adenocarcinoma  Per GYN-ONC w/ interval increase in tumor burden. Pt was treated with Anastrazole and initiated 3 weeks of megace 80mg BID followed by 3 weeks of tamoxifen.   - Pt was on tamoxifen and developed a left cephalic v. thrombus. Tamoxifen was dc'd on 5/17   - GYN-ONC following, appreciate further recs     #Cephalic vein thrombosis  - As above  - Was started on AC w/ Lovenox 60mg BID but given the location of thrombus, will d/c full AC   - Repeat US of LUE tomorrow to evaluate for worsening clot     Endocrine  # r/o adrenal insufficiency  - Given increasing levophed requirements and possible autonomic dysfunction related to AIDP, tested pt for AM cortisol which was 18  - Ordered for cosyntropin stim test. Will follow up results     FLUIDS/ELECTROLYTES/NUTRITION  -IVF: none  -Monitor, Replete to K>4 and Mg>2  -Diet: NPO till 3 P    PROPHYLAXIS  -DVT: Lovenox tonight, SCDs  -GI: none as on feeds    DISPO: MICU 60 yo F PMHx polio, breast cancer, DM, Endometrial Cancer s/p exploratory laparotomy, enterolysis, SHAMIR, BSO, pelvic lymphadenectomy, low anterior resection mobilization of splenic flexure, end colostomy on 7/30/18, rectovaginal fistula, numerous admissions for urinary tract infection presented to [facility] in the setting of urosepsis. Hospital course complicated by ESBL E coli bacteremia ( completed ertapenem on 5/14 ) and respiratory failure requiring multiple intubations 2/2 AIDP, now s/p trach and PEG ( 5/21), being treated for sepsis     CARDIOVASCULAR  #Hemodynamics  - Pt has been hard to wean off levophed. Pt's levo requirements over last 24h have been down. Her hypotension is most likely 2/2 autonomic dysfunction from AIDP and not septic shock   - Started on florinef 0.1mg qd and continued on midodrine 15mg TID  - Pt is mentating well, making good urine and has warm extremities    - Titrate MAP > 65     PULMONARY  #Acute hypercapnic respiratory failure   2/2 to demyelinating polyneuropathy (AIDP), patient has required 3 intubations this admission, now s/p trach (5/21)   - s/p 4 doses of IVIG finished 5/11 and 5 rounds of plasmapheresis ( finished 5/21)  -Continue w/ CPAP trial today      #Hx of urinary retention/overflow incontinence   - [place] changed on 5/17     GI  # PEG placement  - Pt s/p PEG placement 5/21   - Continue w/ Osmolite 1.2    Neurology  #AIDP  Patient w AIDP leading to respiratory compromise, previous EMG w demyelinating polyneuropathy  - s/p 4 rounds of IVIG and 5 rounds of plasmapheresis ( completed 5/21).  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  - Per Neuro, no plan for IVIG at this time    INFECTIOUS DISEASE  # SEPSIS  - Pt spiked to 101.6  yesterday ( 5/22).  CXR w/ no infiltrates. Lactate normal. UA not received in lab but UCx growing Enterococcus and Bronch wash growing MRSA   - Continue w/ Vancomycin ( 5/22- 5/28() for a total of 7 days. Meropenem was discontinued today   - Vanc trough at 5PM today   - BCx 5/22 NGTD    #hx of recurrent ESBL ecoli UTI and bacteremia  - previously on admission patient admitted to MICU in septic shock 2/2 to ESBL ecoli bacteremia and UTI  - Completed tx w/ ertapenem on 5/14   - Repeat UCx now growing 50k Enterococcus     HEME/ONC  # Anemia   - Hb 7 today; s/p 1u pRBC's 5/18  - Repeat Hb 6.7. Will transfused 1 unit   - Currently no source of bleeding  - Maintain active T&S    #Stage IV endometrial adenocarcinoma  Per GYN-ONC w/ interval increase in tumor burden. Pt was treated with Anastrazole and initiated 3 weeks of megace 80mg BID followed by 3 weeks of tamoxifen.   - Pt was on tamoxifen and developed a left cephalic v. thrombus. Tamoxifen was dc'd on 5/17   - GYN-ONC following, will be having a family meeting today regarding further options and GOC    #Cephalic vein thrombosis  - As above  - Was started on AC w/ Lovenox 60mg BID but given the location of thrombus, A/C was dc'd   - Repeat US of LUE today to evaluate for worsening clot     Endocrine  # r/o adrenal insufficiency  - Given increasing levophed requirements and possible autonomic dysfunction related to AIDP, tested for AM cortisol 5/22 which was 18  - Stim test was negative  - Started on florinef 0.1mg to wean off levophed    FLUIDS/ELECTROLYTES/NUTRITION  -IVF: none  -Monitor, Replete to K>4 and Mg>2  -Diet: NPO w/ feeds     PROPHYLAXIS  -DVT: Lovenox, SCDs  -GI: none as on feeds    DISPO: MICU 58 yo F PMHx polio, breast cancer, DM, Endometrial Cancer s/p exploratory laparotomy, enterolysis, SHAMIR, BSO, pelvic lymphadenectomy, low anterior resection mobilization of splenic flexure, end colostomy on 7/30/18, rectovaginal fistula, numerous admissions for urinary tract infection presented to [facility] in the setting of urosepsis. Hospital course complicated by ESBL E coli bacteremia ( completed ertapenem on 5/14 ) and respiratory failure requiring multiple intubations 2/2 AIDP, now s/p trach and PEG ( 5/21), being treated for sepsis     CARDIOVASCULAR  #Hemodynamics  - Pt has been hard to wean off levophed. Pt's levo requirements over last 24h have been down. Her hypotension is most likely 2/2 autonomic dysfunction from AIDP and not septic shock   - Started on florinef 0.1mg qd and continued on midodrine 15mg TID  - Pt is mentating well, making good urine and has warm extremities    - Titrate MAP > 65     PULMONARY  #Acute hypercapnic respiratory failure   2/2 to demyelinating polyneuropathy (AIDP), patient has required 3 intubations this admission, now s/p trach (5/21)   - s/p 4 doses of IVIG finished 5/11 and 5 rounds of plasmapheresis ( finished 5/21)  -Continue w/ CPAP trial today      #Hx of urinary retention/overflow incontinence   - [place] changed on 5/17     GI  # PEG placement  - Pt s/p PEG placement 5/21   - Continue w/ Osmolite 1.2    Neurology  #AIDP  Patient w AIDP leading to respiratory compromise, previous EMG w demyelinating polyneuropathy  - s/p 4 rounds of IVIG and 5 rounds of plasmapheresis ( completed 5/21).  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  - Per Neuro, no plan for IVIG at this time    INFECTIOUS DISEASE  # SEPSIS  - Pt spiked to 101.6  yesterday ( 5/22).  CXR w/ no infiltrates. Lactate normal. UA not received in lab but UCx growing Enterococcus and Bronch wash growing MRSA   - Continue w/ Vancomycin ( 5/22- 5/28() for a total of 7 days. Meropenem was discontinued today   - Vanc trough at 5PM today   - BCx 5/22 NGTD    #hx of recurrent ESBL ecoli UTI and bacteremia  - previously on admission patient admitted to MICU in septic shock 2/2 to ESBL ecoli bacteremia and UTI  - Completed tx w/ ertapenem on 5/14   - Repeat UCx now growing 50k Enterococcus     HEME/ONC  # Anemia   - Hb 7 today; s/p 1u pRBC's 5/18  - Repeat Hb 6.7. Will transfused 1 unit   - Currently no source of bleeding  - Maintain active T&S    #Stage IV endometrial adenocarcinoma  Per GYN-ONC w/ interval increase in tumor burden. Pt was treated with Anastrazole and initiated 3 weeks of megace 80mg BID followed by 3 weeks of tamoxifen.   - Pt was on tamoxifen and developed a left cephalic v. thrombus. Tamoxifen was dc'd on 5/17   - GYN-ONC following, will be having a family meeting today regarding further options and GOC    #Cephalic vein thrombosis  - As above  - Was started on AC w/ Lovenox 60mg BID but given the location of thrombus, A/C was dc'd   - Repeat US of LUE today to evaluate for worsening clot     Endocrine  # r/o adrenal insufficiency  - Given increasing levophed requirements and possible autonomic dysfunction related to AIDP, tested for AM cortisol 5/22 which was 18  - Stim test was negative  - Started on florinef 0.1mg to wean off levophed    #Hyperglycemia  - Continue Lantus 8 U at bedtime   - SSI and FSG     FLUIDS/ELECTROLYTES/NUTRITION  -IVF: none  -Monitor, Replete to K>4 and Mg>2  -Diet: NPO w/ feeds     PROPHYLAXIS  -DVT: Lovenox, SCDs  -GI: none as on feeds    DISPO: MICU

## 2019-05-23 NOTE — PROCEDURE NOTE - NSINDICATIONS_GEN_A_CORE
CSF sampling/critical patient
Plasma exchange
drainage/feeds
venous access/antibiotic therapy
critical patient/respiratory distress/respiratory failure
failure to thrive/feeding difficulties/feeding tube replacement
feeds
respiratory distress/respiratory failure/critical patient

## 2019-05-23 NOTE — BEHAVIORAL HEALTH ASSESSMENT NOTE - HPI (INCLUDE ILLNESS QUALITY, SEVERITY, DURATION, TIMING, CONTEXT, MODIFYING FACTORS, ASSOCIATED SIGNS AND SYMPTOMS)
Ms. Baker is a 59 year old [place]-American female, [employer], single, no children, currently unemployed, living with her disabled brother (who had a stroke and is wheelchair bound) and his home health aide in [place],  PPH of depression since 2018, no history of psych hospitalizations, PMH of polio, breast cancer, DM, endometrial cancer s/p exploratory laparotomy, enterolysis, SHAMIR, RICH, pelvic lymphadenectomy, low anterior resection mobilization of splenic flexure, end colostomy on 7/30/18, rectovaginal fistula, numerous admissions for urinary tract infection presented to [facility] in the setting of urosepsis. Hospital course complicated by blood stream infection (currently on ertapenem) and respiratory failure requiring intubation s/p extubation on 5/2. Per collateral information patient became noticeably weak mala in her hands for approx 1 week prior to being diagnosed with IDP in April, suggesting a more acute process. 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     Writer met with pt individually at bedside. Pt was oriented x 4. Pt was unable to speak but communicated with writer via writing on notepad. Pt was well-related and help-seeking. Pt presents with several symptoms of depression, including crying spells, feelings of sadness, hopelessness, anxiety, pessimism, helplessness, and passive SI without plan or intent. Pt was tearful and reported feeling angry, and hopeless in the context of ongoing medical problems. She reported that she saw a psychiatrist in 2018 and was prescribed Xanax, however she did not find it to be helpful. Pt denies symptoms of confusion/paranoia/AH/VH/dee/HI.

## 2019-05-23 NOTE — BEHAVIORAL HEALTH ASSESSMENT NOTE - NSBHADMITCOUNSEL_PSY_A_CORE
risks and benefits of treatment options/importance of adherence to chosen treatment/diagnostic results/impressions and/or recommended studies/client/family/caregiver education/instructions for management, treatment and follow up/risk factor reduction/prognosis

## 2019-05-23 NOTE — PROGRESS NOTE ADULT - SUBJECTIVE AND OBJECTIVE BOX
INTERVAL HPI/OVERNIGHT EVENTS:    SUBJECTIVE: Patient seen and examined at bedside.     CONSTITUTIONAL: No weakness, fevers or chills  EYES/ENT: No visual changes;  No vertigo or throat pain   NECK: No pain or stiffness  RESPIRATORY: No cough, wheezing, hemoptysis; No shortness of breath  CARDIOVASCULAR: No chest pain or palpitations  GASTROINTESTINAL: No abdominal or epigastric pain. No nausea, vomiting, or hematemesis; No diarrhea or constipation. No melena or hematochezia.  GENITOURINARY: No dysuria, frequency or hematuria  NEUROLOGICAL: No numbness or weakness  SKIN: No itching, rashes    OBJECTIVE:    VITAL SIGNS:  ICU Vital Signs Last 24 Hrs  T(C): 36.5 (23 May 2019 05:15), Max: 39.1 (22 May 2019 07:00)  T(F): 97.7 (23 May 2019 05:15), Max: 102.4 (22 May 2019 07:00)  HR: 72 (23 May 2019 05:00) (64 - 96)  BP: 101/64 (23 May 2019 05:00) (84/54 - 162/72)  BP(mean): 78 (23 May 2019 05:00) (58 - 111)  ABP: --  ABP(mean): --  RR: 39 (23 May 2019 05:00) (12 - 48)  SpO2: 100% (23 May 2019 05:00) (99% - 100%)    Mode: AC/ CMV (Assist Control/ Continuous Mandatory Ventilation), RR (machine): 12, TV (machine): 320, FiO2: 40, PEEP: 5, ITime: 1, MAP: 8, PIP: 18    05-21 @ 07:01 - 05-22 @ 07:00  --------------------------------------------------------  IN: 717.7 mL / OUT: 1205 mL / NET: -487.3 mL    05-22 @ 07:01  -  05-23 @ 06:30  --------------------------------------------------------  IN: 1766 mL / OUT: 750 mL / NET: 1016 mL      CAPILLARY BLOOD GLUCOSE      POCT Blood Glucose.: 166 mg/dL (23 May 2019 06:12)      PHYSICAL EXAM:    General: NAD  HEENT: NC/AT; PERRL, clear conjunctiva  Neck: supple  Respiratory: CTA b/l  Cardiovascular: +S1/S2; RRR  Abdomen: soft, NT/ND; +BS x4  Extremities: WWP, 2+ peripheral pulses b/l; no LE edema  Skin: normal color and turgor; no rash  Neurological:    MEDICATIONS:  MEDICATIONS  (STANDING):  albumin human  5% IVPB 2750 milliLiter(s) IV Intermittent once  ascorbic acid 500 milliGRAM(s) Oral daily  calcium gluconate IVPB 1 Gram(s) IV Intermittent once  chlorhexidine 0.12% Liquid 15 milliLiter(s) Oral Mucosa every 12 hours  chlorhexidine 2% Cloths 1 Application(s) Topical <User Schedule>  dextrose 5%. 1000 milliLiter(s) (50 mL/Hr) IV Continuous <Continuous>  dextrose 50% Injectable 25 Gram(s) IV Push once  docusate sodium Liquid 100 milliGRAM(s) Oral daily  enoxaparin Injectable 40 milliGRAM(s) SubCutaneous every 24 hours  fludroCORTISONE 0.1 milliGRAM(s) Oral every 24 hours  Heparin 1000 Units / ml 2 milliLiter(s) 2 milliLiter(s) IV Push once  insulin regular  human corrective regimen sliding scale   SubCutaneous every 6 hours  meropenem  IVPB 1000 milliGRAM(s) IV Intermittent every 8 hours  midodrine 15 milliGRAM(s) Oral every 8 hours  norepinephrine Infusion 0.05 MICROgram(s)/kG/Min (5.466 mL/Hr) IV Continuous <Continuous>  nystatin Cream 1 Application(s) Topical two times a day  pantoprazole   Suspension 40 milliGRAM(s) Oral daily  petrolatum Ophthalmic Ointment 1 Application(s) Both EYES three times a day  senna 2 Tablet(s) Oral at bedtime  vancomycin  IVPB 1000 milliGRAM(s) IV Intermittent every 12 hours  zinc oxide 20% Ointment 1 Application(s) Topical three times a day    MEDICATIONS  (PRN):  dextrose 40% Gel 15 Gram(s) Oral once PRN Blood Glucose LESS THAN 70 milliGRAM(s)/deciliter  glucagon  Injectable 1 milliGRAM(s) IntraMuscular once PRN Glucose LESS THAN 70 milligrams/deciliter      ALLERGIES:  Allergies    No Known Allergies    Intolerances    IVIG PRODUCT IS PRIGIVEN OR GAMMUNEX (Unknown)      LABS:                        8.0    20.66 )-----------( 409      ( 22 May 2019 04:56 )             25.9     05-22    141  |  100  |  15  ----------------------------<  138<H>  3.9   |  26  |  0.54    Ca    8.8      22 May 2019 04:57  Phos  4.4     05-22  Mg     1.6     05-22    TPro  5.9<L>  /  Alb  4.6  /  TBili  1.0  /  DBili  x   /  AST  12  /  ALT  <5<L>  /  AlkPhos  26<L>  05-22    PT/INR - ( 22 May 2019 04:57 )   PT: 14.6 sec;   INR: 1.28          PTT - ( 22 May 2019 04:57 )  PTT:28.9 sec      RADIOLOGY & ADDITIONAL TESTS: Reviewed. INTERVAL HPI/OVERNIGHT EVENTS:Urine output dropped to 15cc/h at 4 and 5 AM. Given 500 cc bolus     SUBJECTIVE: Patient seen and examined at bedside. Pt complaining of b/l temporal HA and pain over PEG site. Denies any bleeding.     CONSTITUTIONAL: No fevers or chills  EYES/ENT: No visual changes;  No vertigo or throat pain   NECK: No pain or stiffness  RESPIRATORY: +Secretions. No shortness of breath  CARDIOVASCULAR: No chest pain or palpitations  GASTROINTESTINAL: Abdominal pain over PEG site. No nausea, vomiting, or hematemesis; No diarrhea or constipation. No melena or hematochezia.  GENITOURINARY: No dysuria, frequency or hematuria  NEUROLOGICAL: No numbness or weakness  SKIN: No itching, rashes    OBJECTIVE:    VITAL SIGNS:  ICU Vital Signs Last 24 Hrs  T(C): 36.5 (23 May 2019 05:15), Max: 39.1 (22 May 2019 07:00)  T(F): 97.7 (23 May 2019 05:15), Max: 102.4 (22 May 2019 07:00)  HR: 72 (23 May 2019 05:00) (64 - 96)  BP: 101/64 (23 May 2019 05:00) (84/54 - 162/72)  BP(mean): 78 (23 May 2019 05:00) (58 - 111)  ABP: --  ABP(mean): --  RR: 39 (23 May 2019 05:00) (12 - 48)  SpO2: 100% (23 May 2019 05:00) (99% - 100%)    Mode: AC/ CMV (Assist Control/ Continuous Mandatory Ventilation), RR (machine): 12, TV (machine): 320, FiO2: 40, PEEP: 5, ITime: 1, MAP: 8, PIP: 18    05-21 @ 07:01  -  05-22 @ 07:00  --------------------------------------------------------  IN: 717.7 mL / OUT: 1205 mL / NET: -487.3 mL    05-22 @ 07:01  -  05-23 @ 06:30  --------------------------------------------------------  IN: 1766 mL / OUT: 750 mL / NET: 1016 mL      CAPILLARY BLOOD GLUCOSE      POCT Blood Glucose.: 166 mg/dL (23 May 2019 06:12)      PHYSICAL EXAM:    General: NAD, comfortable, tolerating CPAP  HEENT: NCAT, PERRL, clear conjunctiva, no scleral icterus  Neck: supple, no JVD  Respiratory:  Rhonchi bilaterally upper> lower lung fields   Cardiovascular: RRR, normal S1S2, no M/R/G  Vascular: 1+ radial and DP pulses  Abdomen: soft, NT/ND, bowel sounds present, L colostomy w pink stoma, no palpable masses, PEG tube on place ( w/ some dry blood around)   Extremities: warm to touch, no clubbing, cyanosis, or edema  Skin: Erythema over buttocks bilaterally   Neuro: Awake and alert, following all commands. 2/5 in LE b/l, 4/5 UE b/l  No focal deficits    MEDICATIONS:  MEDICATIONS  (STANDING):  albumin human  5% IVPB 2750 milliLiter(s) IV Intermittent once  ascorbic acid 500 milliGRAM(s) Oral daily  calcium gluconate IVPB 1 Gram(s) IV Intermittent once  chlorhexidine 0.12% Liquid 15 milliLiter(s) Oral Mucosa every 12 hours  chlorhexidine 2% Cloths 1 Application(s) Topical <User Schedule>  dextrose 5%. 1000 milliLiter(s) (50 mL/Hr) IV Continuous <Continuous>  dextrose 50% Injectable 25 Gram(s) IV Push once  docusate sodium Liquid 100 milliGRAM(s) Oral daily  enoxaparin Injectable 40 milliGRAM(s) SubCutaneous every 24 hours  fludroCORTISONE 0.1 milliGRAM(s) Oral every 24 hours  Heparin 1000 Units / ml 2 milliLiter(s) 2 milliLiter(s) IV Push once  insulin regular  human corrective regimen sliding scale   SubCutaneous every 6 hours  meropenem  IVPB 1000 milliGRAM(s) IV Intermittent every 8 hours  midodrine 15 milliGRAM(s) Oral every 8 hours  norepinephrine Infusion 0.05 MICROgram(s)/kG/Min (5.466 mL/Hr) IV Continuous <Continuous>  nystatin Cream 1 Application(s) Topical two times a day  pantoprazole   Suspension 40 milliGRAM(s) Oral daily  petrolatum Ophthalmic Ointment 1 Application(s) Both EYES three times a day  senna 2 Tablet(s) Oral at bedtime  vancomycin  IVPB 1000 milliGRAM(s) IV Intermittent every 12 hours  zinc oxide 20% Ointment 1 Application(s) Topical three times a day    MEDICATIONS  (PRN):  dextrose 40% Gel 15 Gram(s) Oral once PRN Blood Glucose LESS THAN 70 milliGRAM(s)/deciliter  glucagon  Injectable 1 milliGRAM(s) IntraMuscular once PRN Glucose LESS THAN 70 milligrams/deciliter      ALLERGIES:  Allergies    No Known Allergies    Intolerances    IVIG PRODUCT IS PRIGIVEN OR GAMMUNEX (Unknown)      LABS:                        8.0    20.66 )-----------( 409      ( 22 May 2019 04:56 )             25.9     05-22    141  |  100  |  15  ----------------------------<  138<H>  3.9   |  26  |  0.54    Ca    8.8      22 May 2019 04:57  Phos  4.4     05-22  Mg     1.6     05-22    TPro  5.9<L>  /  Alb  4.6  /  TBili  1.0  /  DBili  x   /  AST  12  /  ALT  <5<L>  /  AlkPhos  26<L>  05-22    PT/INR - ( 22 May 2019 04:57 )   PT: 14.6 sec;   INR: 1.28          PTT - ( 22 May 2019 04:57 )  PTT:28.9 sec      RADIOLOGY & ADDITIONAL TESTS: Reviewed. INTERVAL HPI/OVERNIGHT EVENTS:Urine output dropped to 15cc/h at 4 and 5 AM. Given 500 cc bolus     SUBJECTIVE: Patient seen and examined at bedside. Pt complaining of b/l temporal HA and pain over PEG site. Denies any bleeding.     CONSTITUTIONAL: No fevers or chills  EYES/ENT: No visual changes;  No vertigo or throat pain   NECK: No pain or stiffness  RESPIRATORY: +Secretions. No shortness of breath  CARDIOVASCULAR: No chest pain or palpitations  GASTROINTESTINAL: Abdominal pain over PEG site. No nausea, vomiting, or hematemesis; No diarrhea or constipation. No melena or hematochezia.  GENITOURINARY: No dysuria, frequency or hematuria  NEUROLOGICAL: No numbness or weakness  SKIN: No itching, rashes    OBJECTIVE:    VITAL SIGNS:  ICU Vital Signs Last 24 Hrs  T(C): 36.5 (23 May 2019 05:15), Max: 39.1 (22 May 2019 07:00)  T(F): 97.7 (23 May 2019 05:15), Max: 102.4 (22 May 2019 07:00)  HR: 72 (23 May 2019 05:00) (64 - 96)  BP: 101/64 (23 May 2019 05:00) (84/54 - 162/72)  BP(mean): 78 (23 May 2019 05:00) (58 - 111)  ABP: --  ABP(mean): --  RR: 39 (23 May 2019 05:00) (12 - 48)  SpO2: 100% (23 May 2019 05:00) (99% - 100%)    Mode: AC/ CMV (Assist Control/ Continuous Mandatory Ventilation), RR (machine): 12, TV (machine): 320, FiO2: 40, PEEP: 5, ITime: 1, MAP: 8, PIP: 18    05-21 @ 07:01  -  05-22 @ 07:00  --------------------------------------------------------  IN: 717.7 mL / OUT: 1205 mL / NET: -487.3 mL    05-22 @ 07:01  -  05-23 @ 06:30  --------------------------------------------------------  IN: 1766 mL / OUT: 750 mL / NET: 1016 mL      CAPILLARY BLOOD GLUCOSE      POCT Blood Glucose.: 166 mg/dL (23 May 2019 06:12)      PHYSICAL EXAM:    General: NAD, comfortable, on vent   HEENT: NCAT, PERRL, clear conjunctiva, no scleral icterus  Neck: supple, no JVD  Respiratory:  Rhonchi bilaterally upper> lower lung fields   Cardiovascular: RRR, normal S1S2, no M/R/G  Vascular: 1+ radial and DP pulses  Abdomen: soft, NT/ND, bowel sounds present, L colostomy w pink stoma, no palpable masses, PEG tube on place ( w/ some dry blood around)   Extremities: warm to touch, no clubbing, cyanosis, or edema  Skin: Erythema over buttocks bilaterally   Neuro: Awake and alert, following all commands. 2/5 in LE b/l, 4/5 UE b/l  No focal deficits    MEDICATIONS:  MEDICATIONS  (STANDING):  albumin human  5% IVPB 2750 milliLiter(s) IV Intermittent once  ascorbic acid 500 milliGRAM(s) Oral daily  calcium gluconate IVPB 1 Gram(s) IV Intermittent once  chlorhexidine 0.12% Liquid 15 milliLiter(s) Oral Mucosa every 12 hours  chlorhexidine 2% Cloths 1 Application(s) Topical <User Schedule>  dextrose 5%. 1000 milliLiter(s) (50 mL/Hr) IV Continuous <Continuous>  dextrose 50% Injectable 25 Gram(s) IV Push once  docusate sodium Liquid 100 milliGRAM(s) Oral daily  enoxaparin Injectable 40 milliGRAM(s) SubCutaneous every 24 hours  fludroCORTISONE 0.1 milliGRAM(s) Oral every 24 hours  Heparin 1000 Units / ml 2 milliLiter(s) 2 milliLiter(s) IV Push once  insulin regular  human corrective regimen sliding scale   SubCutaneous every 6 hours  meropenem  IVPB 1000 milliGRAM(s) IV Intermittent every 8 hours  midodrine 15 milliGRAM(s) Oral every 8 hours  norepinephrine Infusion 0.05 MICROgram(s)/kG/Min (5.466 mL/Hr) IV Continuous <Continuous>  nystatin Cream 1 Application(s) Topical two times a day  pantoprazole   Suspension 40 milliGRAM(s) Oral daily  petrolatum Ophthalmic Ointment 1 Application(s) Both EYES three times a day  senna 2 Tablet(s) Oral at bedtime  vancomycin  IVPB 1000 milliGRAM(s) IV Intermittent every 12 hours  zinc oxide 20% Ointment 1 Application(s) Topical three times a day    MEDICATIONS  (PRN):  dextrose 40% Gel 15 Gram(s) Oral once PRN Blood Glucose LESS THAN 70 milliGRAM(s)/deciliter  glucagon  Injectable 1 milliGRAM(s) IntraMuscular once PRN Glucose LESS THAN 70 milligrams/deciliter      ALLERGIES:  Allergies    No Known Allergies    Intolerances    IVIG PRODUCT IS PRIGIVEN OR GAMMUNEX (Unknown)      LABS:                        8.0    20.66 )-----------( 409      ( 22 May 2019 04:56 )             25.9     05-22    141  |  100  |  15  ----------------------------<  138<H>  3.9   |  26  |  0.54    Ca    8.8      22 May 2019 04:57  Phos  4.4     05-22  Mg     1.6     05-22    TPro  5.9<L>  /  Alb  4.6  /  TBili  1.0  /  DBili  x   /  AST  12  /  ALT  <5<L>  /  AlkPhos  26<L>  05-22    PT/INR - ( 22 May 2019 04:57 )   PT: 14.6 sec;   INR: 1.28          PTT - ( 22 May 2019 04:57 )  PTT:28.9 sec      RADIOLOGY & ADDITIONAL TESTS: Reviewed.

## 2019-05-23 NOTE — PROGRESS NOTE ADULT - SUBJECTIVE AND OBJECTIVE BOX
NEUROLOGY CONSULT PROGRESS NOTE    INTERVAL HISTORY:    Patient with headache overnight. WBC and hemoglobin downtrending. Plan for family meeting this afternoon.    REVIEW OF SYSTEMS:  As per HPI, otherwise negative for Constitutional, Eyes, Ears/Nose/Mouth/Throat, Neck, Cardiovascular, Respiratory, Gastrointestinal, Genitourinary, Skin, Endocrine, Musculoskeletal, Psychiatric, and Hematologic/Lymphatic.    MEDICATIONS:  albumin human  5% IVPB 2750 milliLiter(s) IV Intermittent once  ascorbic acid 500 milliGRAM(s) Oral daily  calcium gluconate IVPB 1 Gram(s) IV Intermittent once  chlorhexidine 0.12% Liquid 15 milliLiter(s) Oral Mucosa every 12 hours  chlorhexidine 2% Cloths 1 Application(s) Topical <User Schedule>  dextrose 40% Gel 15 Gram(s) Oral once PRN  dextrose 5%. 1000 milliLiter(s) IV Continuous <Continuous>  dextrose 50% Injectable 25 Gram(s) IV Push once  docusate sodium Liquid 100 milliGRAM(s) Oral daily  enoxaparin Injectable 40 milliGRAM(s) SubCutaneous every 24 hours  fludroCORTISONE 0.1 milliGRAM(s) Oral every 24 hours  glucagon  Injectable 1 milliGRAM(s) IntraMuscular once PRN  Heparin 1000 Units / ml 2 milliLiter(s) 2 milliLiter(s) IV Push once  insulin regular  human corrective regimen sliding scale   SubCutaneous every 6 hours  meropenem  IVPB 1000 milliGRAM(s) IV Intermittent every 8 hours  midodrine 15 milliGRAM(s) Oral every 8 hours  norepinephrine Infusion 0.05 MICROgram(s)/kG/Min IV Continuous <Continuous>  nystatin Cream 1 Application(s) Topical two times a day  pantoprazole   Suspension 40 milliGRAM(s) Oral daily  petrolatum Ophthalmic Ointment 1 Application(s) Both EYES three times a day  potassium chloride   Powder 40 milliEquivalent(s) Oral every 2 hours  senna 2 Tablet(s) Oral at bedtime  vancomycin  IVPB 1000 milliGRAM(s) IV Intermittent every 12 hours  zinc oxide 20% Ointment 1 Application(s) Topical three times a day    VITAL SIGNS:  Vital Signs Last 24 Hrs  T(C): 36.5 (23 May 2019 05:15), Max: 38.2 (22 May 2019 09:43)  T(F): 97.7 (23 May 2019 05:15), Max: 100.8 (22 May 2019 09:43)  HR: 70 (23 May 2019 07:00) (64 - 96)  BP: 121/86 (23 May 2019 07:00) (84/54 - 162/72)  BP(mean): 97 (23 May 2019 07:00) (58 - 111)  RR: 14 (23 May 2019 07:00) (12 - 48)  SpO2: 100% (23 May 2019 07:00) (99% - 100%)    PHYSICAL EXAMINATION:  Constitutional: Frail middle age female, intubated, NG tube in place  Eyes: Conjunctiva and sclera clear  Neurologic:  - Mental Status:  Awake and alert, able to follow commands as directed  - Cranial Nerves II-XII:    II: Pupils are equal, round, and reactive to light.  III, IV, VI:  EOMI, horizontal nystagmus with lateral gaze  V:  Facial sensation is intact  VII:  mildly weak eye closure R > L   VIII:  Hearing is intact to finger rub.  XI: Mildly improved shoulder shrug, able to move head  - Motor: Hypotrophic muscles. Upper extremities 4/5; RLE 3/5; LLE 2/5, Ankle 0/5 b/l  - Reflexes:  absent throughout   - Sensory:  Intact to light touch  - Gait: Deferred    LABS:                          7.0    16.79 )-----------( 361      ( 23 May 2019 06:22 )             22.6     05-23    139  |  100  |  19  ----------------------------<  164<H>  3.4<L>   |  28  |  0.48<L>    Ca    9.3      23 May 2019 06:22  Phos  4.4     05-22  Mg     2.0     05-23    TPro  5.9<L>  /  Alb  4.6  /  TBili  1.0  /  DBili  x   /  AST  12  /  ALT  <5<L>  /  AlkPhos  26<L>  05-22  PT/INR - ( 23 May 2019 06:22 )   PT: 13.4 sec;   INR: 1.18     PTT - ( 23 May 2019 06:22 )  PTT:30.9 sec    RADIOLOGY & ADDITIONAL STUDIES: NEUROLOGY CONSULT PROGRESS NOTE    INTERVAL HISTORY:    Patient with headache overnight. WBC and hemoglobin downtrending. Plan for family meeting this afternoon.  staph aureus in sputum and enterococcus in urine    REVIEW OF SYSTEMS:  As per HPI, otherwise negative for Constitutional, Eyes, Ears/Nose/Mouth/Throat, Neck, Cardiovascular, Respiratory, Gastrointestinal, Genitourinary, Skin, Endocrine, Musculoskeletal, Psychiatric, and Hematologic/Lymphatic.    MEDICATIONS:  albumin human  5% IVPB 2750 milliLiter(s) IV Intermittent once  ascorbic acid 500 milliGRAM(s) Oral daily  calcium gluconate IVPB 1 Gram(s) IV Intermittent once  chlorhexidine 0.12% Liquid 15 milliLiter(s) Oral Mucosa every 12 hours  chlorhexidine 2% Cloths 1 Application(s) Topical <User Schedule>  dextrose 40% Gel 15 Gram(s) Oral once PRN  dextrose 5%. 1000 milliLiter(s) IV Continuous <Continuous>  dextrose 50% Injectable 25 Gram(s) IV Push once  docusate sodium Liquid 100 milliGRAM(s) Oral daily  enoxaparin Injectable 40 milliGRAM(s) SubCutaneous every 24 hours  fludroCORTISONE 0.1 milliGRAM(s) Oral every 24 hours  glucagon  Injectable 1 milliGRAM(s) IntraMuscular once PRN  Heparin 1000 Units / ml 2 milliLiter(s) 2 milliLiter(s) IV Push once  insulin regular  human corrective regimen sliding scale   SubCutaneous every 6 hours  meropenem  IVPB 1000 milliGRAM(s) IV Intermittent every 8 hours  midodrine 15 milliGRAM(s) Oral every 8 hours  norepinephrine Infusion 0.05 MICROgram(s)/kG/Min IV Continuous <Continuous>  nystatin Cream 1 Application(s) Topical two times a day  pantoprazole   Suspension 40 milliGRAM(s) Oral daily  petrolatum Ophthalmic Ointment 1 Application(s) Both EYES three times a day  potassium chloride   Powder 40 milliEquivalent(s) Oral every 2 hours  senna 2 Tablet(s) Oral at bedtime  vancomycin  IVPB 1000 milliGRAM(s) IV Intermittent every 12 hours  zinc oxide 20% Ointment 1 Application(s) Topical three times a day    VITAL SIGNS:  Vital Signs Last 24 Hrs  T(C): 36.5 (23 May 2019 05:15), Max: 38.2 (22 May 2019 09:43)  T(F): 97.7 (23 May 2019 05:15), Max: 100.8 (22 May 2019 09:43)  HR: 70 (23 May 2019 07:00) (64 - 96)  BP: 121/86 (23 May 2019 07:00) (84/54 - 162/72)  BP(mean): 97 (23 May 2019 07:00) (58 - 111)  RR: 14 (23 May 2019 07:00) (12 - 48)  SpO2: 100% (23 May 2019 07:00) (99% - 100%)    PHYSICAL EXAMINATION:  Constitutional: Frail middle age female, intubated, NG tube in place  Eyes: Conjunctiva and sclera clear  Neurologic:  - Mental Status:  Awake and alert, able to follow commands as directed  - Cranial Nerves II-XII:    II: Pupils are equal, round, and reactive to light.  III, IV, VI:  EOMI, horizontal nystagmus with lateral gaze  V:  Facial sensation is intact  VII:  mildly weak eye closure R > L   VIII:  Hearing is intact to finger rub.  XI: Mildly improved shoulder shrug, able to move head  - Motor: Hypotrophic muscles. Upper extremities 4/5; RLE 3/5; LLE 2/5, Ankle 0/5 b/l  - Reflexes:  absent throughout   - Sensory:  Intact to light touch  - Gait: Deferred    LABS:                          7.0    16.79 )-----------( 361      ( 23 May 2019 06:22 )             22.6     05-23    139  |  100  |  19  ----------------------------<  164<H>  3.4<L>   |  28  |  0.48<L>    Ca    9.3      23 May 2019 06:22  Phos  4.4     05-22  Mg     2.0     05-23    TPro  5.9<L>  /  Alb  4.6  /  TBili  1.0  /  DBili  x   /  AST  12  /  ALT  <5<L>  /  AlkPhos  26<L>  05-22  PT/INR - ( 23 May 2019 06:22 )   PT: 13.4 sec;   INR: 1.18     PTT - ( 23 May 2019 06:22 )  PTT:30.9 sec    RADIOLOGY & ADDITIONAL STUDIES:

## 2019-05-23 NOTE — PROGRESS NOTE ADULT - ASSESSMENT
58yo F PMHx polio, breast cancer, DM, Endometrial Cancer s/p exploratory laparotomy, enterolysis, SHAMIR, BSO, pelvic lymphadenectomy, low anterior resection mobilization of splenic flexure, end colostomy on 7/30/18, rectovaginal fistula, numerous admissions for urinary tract infection presented to [facility] in the setting of urosepsis. Hospital course complicated by blood stream infection (currently on ertapenem) and respiratory failure requiring intubation s/p extubation on 5/2. Neurology consulted for worsening lower extremity weakness. On prior admission EMG raises suspicion for CIDP-spectrum disorder. Per collateral information patient became noticeably weak mala in her hands for approx 1 week prior to being diagnosed with IDP in April, suggesting a more acute process. 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 s/p four days of IVIG 500mg/kg on 5/7 - 5/10. Now receiving plasma exchange therapy. Now with improvement of LE strength.    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   Will continue to monitor neurologic function  Family meeting today (see below)

## 2019-05-23 NOTE — BEHAVIORAL HEALTH ASSESSMENT NOTE - SUMMARY
Ms. Baker is a 59 year old Hebrew-American female, Hebrew [employer], single, no children, currently unemployed, living with her disabled brother (who had a stroke and is wheelchair bound) and his home health aide in [place],  PPH of depression since 2018, no history of psych hospitalizations, PMH of polio, breast cancer, DM, endometrial cancer s/p exploratory laparotomy, enterolysis, SHAMIR, RICH, pelvic lymphadenectomy, low anterior resection mobilization of splenic flexure, end colostomy on 7/30/18, rectovaginal fistula, numerous admissions for urinary tract infection presented to [facility] in the setting of urosepsis. Hospital course complicated by blood stream infection (currently on ertapenem) and respiratory failure requiring intubation s/p extubation on 5/2. Per collateral information patient became noticeably weak mala in her hands for approx 1 week prior to being diagnosed with IDP in April, suggesting a more acute process. 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     Writer met with pt individually at bedside. Pt was oriented x 4. Pt was unable to speak but communicated with writer via writing on notepad. Pt was well-related and help-seeking. Pt presents with several symptoms of depression, including crying spells, feelings of sadness, hopelessness, anxiety, pessimism, helplessness, and passive SI without plan or intent. Pt was tearful and reported feeling angry, and hopeless in the context of ongoing medical problems. She reported that she saw a psychiatrist in 2018 and was prescribed Xanax, however she did not find it to be helpful. Pt denies symptoms of confusion/paranoia/AH/VH/dee/HI.    1)Pt vehemently against psych meds in spite of my thought that remeron 15 mg soltab might help her.     2)C/L will follow and provide supportive therapy.

## 2019-05-23 NOTE — PROGRESS NOTE ADULT - ATTENDING COMMENTS
Pt failed CPAP trial with low PS. Pt failed CPAP trial with low PS. Multidisciplinary meeting with pt and family held. Pt told no signs she will wean from mech vent any time soon of if at any point. Will need chronic care facility. continue Vanco for MRSA in BAL wash.

## 2019-05-23 NOTE — PROCEDURE NOTE - NSPROCDETAILS_GEN_ALL_CORE
connected to ventilator/patient pre-oxygenated, tube inserted, placement confirmed
patient pre-oxygenated, tube inserted, placement confirmed/connected to ventilator
audible air bolus
location identified, feeding tube inserted
nasogastric/placement confirmed by auscultation/gastric secretions aspirated, placement confirmed/audible air bolus
guidewire recovered/sterile technique, catheter placed/lumen(s) aspirated and flushed/sterile dressing applied/ultrasound guidance
location identified, draped/prepped, sterile technique used, needle inserted/introduced
ultrasound assessment/location identified, draped/prepped, sterile technique used/sterile dressing applied/supine position/sterile technique, catheter placed/ultrasound guidance

## 2019-05-23 NOTE — CONSULT NOTE ADULT - SUBJECTIVE AND OBJECTIVE BOX
Vascular Access Service Consult Note    59yFemaleHEAL ISSUES - PROBLEM Dx:  Fever: Fever  Chronic respiratory failure  Gram-negative bacteremia: Gram-negative bacteremia  Acute inflammatory demyelinating polyneuropathy: Acute inflammatory demyelinating polyneuropathy  Advance care planning: Advance care planning  Acute respiratory failure requiring reintubation: Acute respiratory failure requiring reintubation  Transition of care performed with sharing of clinical summary: Transition of care performed with sharing of clinical summary  Septic shock due to Escherichia coli: Septic shock due to Escherichia coli  Acute hypercapnic respiratory failure: Acute hypercapnic respiratory failure  Endometrial adenocarcinoma: Endometrial adenocarcinoma  Post-polio syndrome: Post-polio syndrome  Prophylactic measure: Prophylactic measure  History of breast cancer: History of breast cancer  Urinary retention: Urinary retention  Ovarian malignant neoplasm: Ovarian malignant neoplasm  Sepsis secondary to UTI: Sepsis secondary to UTI  CIDP (chronic inflammatory demyelinating polyneuropathy): CIDP (chronic inflammatory demyelinating polyneuropathy)  Ventricular tachycardia, non-sustained: Ventricular tachycardia, non-sustained  Hypomagnesemia: Hypomagnesemia  Ventricular tachycardia: Ventricular tachycardia  Enterovaginal fistula: Enterovaginal fistula  Type 2 diabetes mellitus without complication, without long-term current use of insulin: Type 2 diabetes mellitus without complication, without long-term current use of insulin  Pulmonary hypertension: Pulmonary hypertension  Atelectasis: Atelectasis  Pleural effusion: Pleural effusion  Pyelonephritis: Pyelonephritis  Bacteremia: Bacteremia  Sepsis, due to unspecified organism: Sepsis, due to unspecified organism  Endometrial carcinoma: Endometrial carcinoma  Urinary tract infection: Urinary tract infection  Palliative care by specialist: Palliative care by specialist  Goals of care, counseling/discussion: Goals of care, counseling/discussion  Debility: Debility  Neurogenic bladder: Neurogenic bladder  Fistula: Fistula  Malignant neoplasm: Malignant neoplasm  Acute hypoxemic respiratory failure: Acute hypoxemic respiratory failure  Septic shock: Septic shock             Diagnosis: see above    Indications for Vascular Access (Check all that apply)  [  ]  Antibiotic Therapy       Antibiotic Prescribed:              [  ]  IV Hydration  [  ]  Total Parenteral Nutrition  [  ]  Chemotherapy  [ x ]  Difficult Venous Access  [  ]  CVP monitoring  [  ]  Medications with high potential for tissue necrosis on extravasation  [  ]  Other    Screening (Check all that apply)  Previous Radiation to chest  [  ] Yes      [x  ]  No  Breast Cancer                          [x  ] Left     [  ]  Right    [  ]  No  Lymph Node Dissection         [x  ] Left     [  ]  Right    [  ]  No  Pacemaker or ICD                   [  ] Left     [  ]  Right    [x  ]  No  Upper Extremity DVT             [ x ] Left     [  ]  Right    [  ]  No  Chronic Kidney Disease         [  ]  Yes     [ x ]  No  Hemodialysis                           [  ]  Yes     [ x ]  No  AV Fistula/ Graft                     [  ]  Left    [  ]  Right    [ x ]  No  Temp>101F in past 24 H       [  ]  Yes     [ x ]  No  H/O PICC/Midline                   [x  ]  Yes     [  ]  No    Lab data:                        6.7    16.91 )-----------( 347      ( 23 May 2019 12:34 )             21.9     05-23    139  |  100  |  19  ----------------------------<  164<H>  3.4<L>   |  28  |  0.48<L>    Ca    9.3      23 May 2019 06:22  Phos  4.4     05-22  Mg     2.0     05-23    TPro  5.9<L>  /  Alb  4.6  /  TBili  1.0  /  DBili  x   /  AST  12  /  ALT  <5<L>  /  AlkPhos  26<L>  05-22    PT/INR - ( 23 May 2019 06:22 )   PT: 13.4 sec;   INR: 1.18          PTT - ( 23 May 2019 06:22 )  PTT:30.9 sec    bcx 5/22 ngtd x1d          I have reviewed the chart, interviewed and examined the patient and determined that this patient:  [ x ] Is a candidate for a PICC line  [  ] Is a candidate for a Midline  [  ] Is not a candidate for vascular access device (reason)    Lumens:    [  ] Single  [ x ] Double

## 2019-05-23 NOTE — BEHAVIORAL HEALTH ASSESSMENT NOTE - DESCRIPTION
polio, breast cancer, DM, endometrial cancer s/p exploratory laparotomy, enterolysis, SHAMIR, RICH, pelvic lymphadenectomy, low anterior resection mobilization of splenic flexure, end colostomy on 7/30/18, rectovaginal fistula, numerous admissions for urinary tract infection presented to [facility] in the setting of urosepsis. AIDP

## 2019-05-23 NOTE — PROGRESS NOTE ADULT - ASSESSMENT
58yo F HD24 with stage IV endometrial adenocarcinoma s/p staging surgery '18 and 1 round of chemotherapy 2/19 readmitted from [facility] with presumed urosepsis and respiratory distress.  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  She remains in MICU for ongoing management, continuing pressors for BP support, now with PEG and tracheostomy in place.    1. Neuro: Propofol;  - Neurology following- weakness secondary Guillain [place] versus exacerbation of AIDP, refractory to IVIG treatment, now s/p plasmapharesis x5 rounds over 10 day course.  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 Pulm: Trach 5/21    3. Cardio: continuing pressor requirements, Levo @6, and Midodrine 15mg  4. FEN/GI:  PEG placed 5/21, enteral feeds via PEG. Repletion of electrolytes prn.   5. : Indwelling joseph, remains in place, continue to consider suprapubic cathether (f/u urology recs).  Recurrent UTI/urosepsis; last urine culture 5/18: 4,000 yeast (final)   6. ID: Last temp 100.4 @1755 on 5/22, now afebrile. Meropenem (5/22-), Vancomycin 1g qd (5/22-)  s/p Ertapenem (last 5/14). ID following  7. Endocrine: FS, ISS, Lantus 15u qhs, Humulin 4u q6hr 60yo F HD24 with stage IV endometrial adenocarcinoma s/p staging surgery '18 and 1 round of chemotherapy 2/19 readmitted from [facility] with presumed urosepsis and respiratory distress.  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  She remains in MICU for ongoing management, continuing pressors for BP support, now with PEG and tracheostomy in place.    1. Neuro: Propofol;  - Neurology following- weakness secondary Guillain [place] versus exacerbation of AIDP, refractory to IVIG treatment, now s/p plasmapharesis x5 rounds over 10 day course.  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 Pulm: Trach 5/21    3. Cardio: continuing pressor requirements, Levo @6, and Midodrine 15mg  4. FEN/GI:  PEG placed 5/21, enteral feeds via PEG. Repletion of electrolytes prn.   5. : Indwelling joseph, remains in place, continue to consider suprapubic cathether (f/u urology recs).  Recurrent UTI/urosepsis; last urine culture 5/18: 4,000 yeast (final)   6. ID: Last temp 100.4 @1755 on 5/22, now afebrile. Meropenem (5/22-), Vancomycin 1g qd (5/22-)  s/p Ertapenem (last 5/14). ID following  7. Endocrine: FS, ISS, Lantus 15u qhs, Humulin 4u q6hr    Plan for interdisciplinary meeting today with Neurology, MICU, palliative care, gyn oncology, and pt's family to discuss status and ongoing management

## 2019-05-23 NOTE — BEHAVIORAL HEALTH ASSESSMENT NOTE - AXIS III
polio, breast cancer, DM, endometrial cancer s/p exploratory laparotomy, enterolysis, SHAMIR, RICH, pelvic lymphadenectomy, low anterior resection mobilization of splenic flexure, end colostomy on 7/30/18, rectovaginal fistula, numerous admissions for urinary tract infection presented to [facility] in the setting of urosepsis.

## 2019-05-23 NOTE — PROGRESS NOTE ADULT - ASSESSMENT
58yo F HD23 with stage IV endometrial adenocarcinoma s/p staging surgery '18 and 1 round of chemotherapy 2/19 readmitted from [facility] with fever, previously admitted for management of symptomatic rectovaginal and enterovaginal fistulas. Presents on referral from [facility] with urosepsis. Rapid response called due to worsening respiratory status s/p intubation and MICU admission. She then was on regional GYN service however had respiratory weakness and was transferred to Medicine/tele HD9 until requiring intubation again HD10 and is now in MICU. 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 She was re-intubated on 5/13  and received plasmapheresis x5. Pt now has ET tube and PEG placed 5/21. She was made DNR/DNI 5/14 and on 5/17 found to have upper extremity DVT. Fevers overnight 5/22 up to 102.4, started onmeropenem and vancomycin. Fever w/u initiated with cultures    Management per MICU.    1. Neuro:  On propofol. Weakness secondary to Guillain [place] versus exacerbation of AIDP- now status post IVIG x5 day course, s/p IVIG tx. 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 Pulm: Trach tube (5/21-,  DNR/DNI   3. Cardio: Norepinephrine-weaning off    4. FEN/GI: PEG 5/21- Repletion of electrolytes prn   5. : [place] for overflow incontinence. Recurrent UTIs- last urine culture 5/18: 4,000 yeast (final). Suprapubic catheter possibly in the future. F/u urology recs     6. ID: Afebrile today. On Meropenem 5/22- and vancomycin 1g 5/22-  s/p ertapenem ended 5/14. F/u ID recs   7. Endocrine: FS, ISS, Lantus 15u qhs (started 5/14), Humulin 4u q6hr, Metformin 1000mg BID held at this time   8. VTE prophylaxis - SCDs, Upper extremity DVT noted 5/17-Lovenox 60mg BID. LE Dopplers negative   9. GYN malignancy  - s/p Anastrazole, s/p Megace 80mg BID x3 wks , s/p Tamoxifen 5/1-5/17 (stopped due to upper extremity DVT). Once patient recovers from acute neurologic issue will reassess cancer treatment options   -  Avoid immunotherapy at this time given associated risk of autoimmune disease   10. Derm: monitor sacrum for s/s of pressure ulcer, now stage 2- continue with zinc oxide   11. Follow up PT/OT recs   12. Social work/palliative: DNR/DNI. Plan for interdisciplinary meeting today with Neurology, MICU, palliative care, gyn oncology, and pt's family to discuss status and ongoing management

## 2019-05-23 NOTE — PROCEDURE NOTE - NSSITEPREP_SKIN_A_CORE
chlorhexidine
povidone iodine (if allergic to chlorhexidine)/Adherence to aseptic technique: hand hygiene prior to donning barriers (gown, gloves), don cap and mask, sterile drape over patient/chlorhexidine
chlorhexidine
chlorhexidine

## 2019-05-23 NOTE — BEHAVIORAL HEALTH ASSESSMENT NOTE - OTHER PAST PSYCHIATRIC HISTORY (INCLUDE DETAILS REGARDING ONSET, COURSE OF ILLNESS, INPATIENT/OUTPATIENT TREATMENT)
Age at diagnosis: Pt reports starting to feel depressed since 2018   Past history of inpatient admissions: none  Past history of outpatient psychiatric treatments: saw a psychiatrist in 2018, was prescribed Xanax, pt reports it was not helpful, hesitant to try other psychiatric medication  Past history of individual psychotherapy: no  Past hx of group therapy: no  Past history of self harm: no  Past history of harm to others: no

## 2019-05-23 NOTE — BEHAVIORAL HEALTH ASSESSMENT NOTE - NSBHCONSULTRECOMMENDOTHER_PSY_A_CORE FT
1)Pt vehemently against psych meds in spite of my thought that remeron 15 mg soltab might help her.     2)C/L will follow and provide supportive therapy.

## 2019-05-23 NOTE — PROGRESS NOTE ADULT - ATTENDING COMMENTS
She continues to have low tidal volumes   Strength in limbs is stable  We had long discussion with pt and family along with gyn-onc, palliative care, MICU team; pt and family were informed that the likelihood of her neurologic / overall functional status improving to the point of being able to be discharged home is rather low. While she may improve slowly over time, she remains at risk for complications including blood clots and recurrent infections. Will continue to monitor clinically for improvement, but she may need to plan for long-term care facility vs. comfort measures in hospital. She and her family expressed understanding.

## 2019-05-23 NOTE — PROCEDURE NOTE - NSPOSTPRCRAD_GEN_A_CORE
post-procedure radiography performed
post-procedure radiography performed
central line located in the superior vena cava
ultrasound/line in appropriate postion/picc tip ends at SVC-RA, confirmed with 3cg ultrasound

## 2019-05-24 DIAGNOSIS — J15.211 PNEUMONIA DUE TO METHICILLIN SUSCEPTIBLE STAPHYLOCOCCUS AUREUS: ICD-10-CM

## 2019-05-24 DIAGNOSIS — G61.0 GUILLAIN-BARRE SYNDROME: ICD-10-CM

## 2019-05-24 DIAGNOSIS — G90.9 DISORDER OF THE AUTONOMIC NERVOUS SYSTEM, UNSPECIFIED: ICD-10-CM

## 2019-05-24 DIAGNOSIS — R78.81 BACTEREMIA: ICD-10-CM

## 2019-05-24 DIAGNOSIS — D63.8 ANEMIA IN OTHER CHRONIC DISEASES CLASSIFIED ELSEWHERE: ICD-10-CM

## 2019-05-24 LAB
-  CEFAZOLIN: SIGNIFICANT CHANGE UP
-  CLINDAMYCIN: SIGNIFICANT CHANGE UP
-  DAPTOMYCIN: SIGNIFICANT CHANGE UP
-  ERYTHROMYCIN: SIGNIFICANT CHANGE UP
-  LINEZOLID: SIGNIFICANT CHANGE UP
-  OXACILLIN: SIGNIFICANT CHANGE UP
-  PENICILLIN: SIGNIFICANT CHANGE UP
-  RIFAMPIN: SIGNIFICANT CHANGE UP
-  TETRACYCLINE: SIGNIFICANT CHANGE UP
-  TRIMETHOPRIM/SULFAMETHOXAZOLE: SIGNIFICANT CHANGE UP
-  VANCOMYCIN: SIGNIFICANT CHANGE UP
ANION GAP SERPL CALC-SCNC: 9 MMOL/L — SIGNIFICANT CHANGE UP (ref 5–17)
BUN SERPL-MCNC: 19 MG/DL — SIGNIFICANT CHANGE UP (ref 7–23)
CALCIUM SERPL-MCNC: 8.9 MG/DL — SIGNIFICANT CHANGE UP (ref 8.4–10.5)
CHLORIDE SERPL-SCNC: 104 MMOL/L — SIGNIFICANT CHANGE UP (ref 96–108)
CO2 SERPL-SCNC: 26 MMOL/L — SIGNIFICANT CHANGE UP (ref 22–31)
CREAT SERPL-MCNC: 0.48 MG/DL — LOW (ref 0.5–1.3)
CULTURE RESULTS: SIGNIFICANT CHANGE UP
GLUCOSE BLDC GLUCOMTR-MCNC: 131 MG/DL — HIGH (ref 70–99)
GLUCOSE BLDC GLUCOMTR-MCNC: 192 MG/DL — HIGH (ref 70–99)
GLUCOSE SERPL-MCNC: 138 MG/DL — HIGH (ref 70–99)
HCT VFR BLD CALC: 24.9 % — LOW (ref 34.5–45)
HGB BLD-MCNC: 7.8 G/DL — LOW (ref 11.5–15.5)
MAGNESIUM SERPL-MCNC: 1.9 MG/DL — SIGNIFICANT CHANGE UP (ref 1.6–2.6)
MCHC RBC-ENTMCNC: 28.7 PG — SIGNIFICANT CHANGE UP (ref 27–34)
MCHC RBC-ENTMCNC: 31.3 GM/DL — LOW (ref 32–36)
MCV RBC AUTO: 91.5 FL — SIGNIFICANT CHANGE UP (ref 80–100)
METHOD TYPE: SIGNIFICANT CHANGE UP
METHOD TYPE: SIGNIFICANT CHANGE UP
NRBC # BLD: 0 /100 WBCS — SIGNIFICANT CHANGE UP (ref 0–0)
ORGANISM # SPEC MICROSCOPIC CNT: SIGNIFICANT CHANGE UP
PHOSPHATE SERPL-MCNC: 4.2 MG/DL — SIGNIFICANT CHANGE UP (ref 2.5–4.5)
PLATELET # BLD AUTO: 271 K/UL — SIGNIFICANT CHANGE UP (ref 150–400)
POTASSIUM SERPL-MCNC: 3.9 MMOL/L — SIGNIFICANT CHANGE UP (ref 3.5–5.3)
POTASSIUM SERPL-SCNC: 3.9 MMOL/L — SIGNIFICANT CHANGE UP (ref 3.5–5.3)
RBC # BLD: 2.72 M/UL — LOW (ref 3.8–5.2)
RBC # FLD: 16 % — HIGH (ref 10.3–14.5)
SODIUM SERPL-SCNC: 139 MMOL/L — SIGNIFICANT CHANGE UP (ref 135–145)
SPECIMEN SOURCE: SIGNIFICANT CHANGE UP
VANCOMYCIN FLD-MCNC: 17.2 UG/ML — SIGNIFICANT CHANGE UP
VANCOMYCIN TROUGH SERPL-MCNC: 16.5 UG/ML — SIGNIFICANT CHANGE UP (ref 10–20)
WBC # BLD: 9.19 K/UL — SIGNIFICANT CHANGE UP (ref 3.8–10.5)
WBC # FLD AUTO: 9.19 K/UL — SIGNIFICANT CHANGE UP (ref 3.8–10.5)

## 2019-05-24 PROCEDURE — 99233 SBSQ HOSP IP/OBS HIGH 50: CPT | Mod: GC

## 2019-05-24 PROCEDURE — 71045 X-RAY EXAM CHEST 1 VIEW: CPT | Mod: 26

## 2019-05-24 PROCEDURE — 99231 SBSQ HOSP IP/OBS SF/LOW 25: CPT

## 2019-05-24 PROCEDURE — 99233 SBSQ HOSP IP/OBS HIGH 50: CPT

## 2019-05-24 RX ORDER — MAGNESIUM SULFATE 500 MG/ML
1 VIAL (ML) INJECTION ONCE
Refills: 0 | Status: COMPLETED | OUTPATIENT
Start: 2019-05-24 | End: 2019-05-24

## 2019-05-24 RX ORDER — TRAMADOL HYDROCHLORIDE 50 MG/1
25 TABLET ORAL EVERY 6 HOURS
Refills: 0 | Status: DISCONTINUED | OUTPATIENT
Start: 2019-05-24 | End: 2019-05-25

## 2019-05-24 RX ORDER — LINEZOLID 600 MG/300ML
600 INJECTION, SOLUTION INTRAVENOUS EVERY 12 HOURS
Refills: 0 | Status: DISCONTINUED | OUTPATIENT
Start: 2019-05-25 | End: 2019-05-30

## 2019-05-24 RX ORDER — POTASSIUM CHLORIDE 20 MEQ
20 PACKET (EA) ORAL ONCE
Refills: 0 | Status: COMPLETED | OUTPATIENT
Start: 2019-05-24 | End: 2019-05-24

## 2019-05-24 RX ORDER — LINEZOLID 600 MG/300ML
600 INJECTION, SOLUTION INTRAVENOUS EVERY 12 HOURS
Refills: 0 | Status: DISCONTINUED | OUTPATIENT
Start: 2019-05-24 | End: 2019-05-24

## 2019-05-24 RX ORDER — VANCOMYCIN HCL 1 G
1000 VIAL (EA) INTRAVENOUS ONCE
Refills: 0 | Status: DISCONTINUED | OUTPATIENT
Start: 2019-05-24 | End: 2019-05-24

## 2019-05-24 RX ORDER — VANCOMYCIN HCL 1 G
1000 VIAL (EA) INTRAVENOUS ONCE
Refills: 0 | Status: COMPLETED | OUTPATIENT
Start: 2019-05-24 | End: 2019-05-24

## 2019-05-24 RX ADMIN — Medication 1 APPLICATION(S): at 07:47

## 2019-05-24 RX ADMIN — Medication 1000 MILLIGRAM(S): at 01:29

## 2019-05-24 RX ADMIN — LINEZOLID 600 MILLIGRAM(S): 600 INJECTION, SOLUTION INTRAVENOUS at 14:37

## 2019-05-24 RX ADMIN — NYSTATIN CREAM 1 APPLICATION(S): 100000 CREAM TOPICAL at 06:01

## 2019-05-24 RX ADMIN — Medication 500 MILLIGRAM(S): at 11:50

## 2019-05-24 RX ADMIN — ZINC OXIDE 1 APPLICATION(S): 200 OINTMENT TOPICAL at 14:43

## 2019-05-24 RX ADMIN — ZINC OXIDE 1 APPLICATION(S): 200 OINTMENT TOPICAL at 23:26

## 2019-05-24 RX ADMIN — MIDODRINE HYDROCHLORIDE 15 MILLIGRAM(S): 2.5 TABLET ORAL at 05:56

## 2019-05-24 RX ADMIN — Medication 100 GRAM(S): at 07:39

## 2019-05-24 RX ADMIN — Medication 1000 MILLIGRAM(S): at 02:25

## 2019-05-24 RX ADMIN — INSULIN GLARGINE 8 UNIT(S): 100 INJECTION, SOLUTION SUBCUTANEOUS at 23:25

## 2019-05-24 RX ADMIN — Medication 20 MILLIEQUIVALENT(S): at 07:40

## 2019-05-24 RX ADMIN — Medication 1 APPLICATION(S): at 14:39

## 2019-05-24 RX ADMIN — ZINC OXIDE 1 APPLICATION(S): 200 OINTMENT TOPICAL at 06:03

## 2019-05-24 RX ADMIN — Medication 250 MILLIGRAM(S): at 11:08

## 2019-05-24 RX ADMIN — CHLORHEXIDINE GLUCONATE 1 APPLICATION(S): 213 SOLUTION TOPICAL at 06:01

## 2019-05-24 RX ADMIN — ENOXAPARIN SODIUM 40 MILLIGRAM(S): 100 INJECTION SUBCUTANEOUS at 23:26

## 2019-05-24 RX ADMIN — PANTOPRAZOLE SODIUM 40 MILLIGRAM(S): 20 TABLET, DELAYED RELEASE ORAL at 11:47

## 2019-05-24 RX ADMIN — CHLORHEXIDINE GLUCONATE 15 MILLILITER(S): 213 SOLUTION TOPICAL at 11:08

## 2019-05-24 RX ADMIN — Medication 100 MILLIGRAM(S): at 11:46

## 2019-05-24 RX ADMIN — INSULIN HUMAN 2: 100 INJECTION, SOLUTION SUBCUTANEOUS at 11:58

## 2019-05-24 RX ADMIN — SENNA PLUS 2 TABLET(S): 8.6 TABLET ORAL at 23:26

## 2019-05-24 RX ADMIN — MIDODRINE HYDROCHLORIDE 15 MILLIGRAM(S): 2.5 TABLET ORAL at 14:37

## 2019-05-24 RX ADMIN — FLUDROCORTISONE ACETATE 0.1 MILLIGRAM(S): 0.1 TABLET ORAL at 05:56

## 2019-05-24 NOTE — PROGRESS NOTE ADULT - SUBJECTIVE AND OBJECTIVE BOX
Pt seen and examined at bedside. She points to the site of PEG placement and states that she has pain in that area.   She does not like the trach and is upset that she needs it     T(F): 99.5 (19 @ 14:00), Max: 100.3 (19 @ 22:03)  HR: 70 (19 @ 15:00) (56 - 76)  BP: 128/59 (19 @ 15:00) (90/52 - 148/73)  RR: 27 (19 @ 15:00) (14 - 51)  SpO2: 100% (19 @ 15:00) (75% - 100%)  Wt(kg): --  I&O's Summary    23 May 2019 07:  -  24 May 2019 07:00  --------------------------------------------------------  IN: 1712.5 mL / OUT: 2415 mL / NET: -702.5 mL    24 May 2019 07:  -  24 May 2019 16:11  --------------------------------------------------------  IN: 588 mL / OUT: 585 mL / NET: 3 mL    MEDICATIONS  (STANDING):  ascorbic acid 500 milliGRAM(s) Oral daily  chlorhexidine 0.12% Liquid 15 milliLiter(s) Oral Mucosa every 12 hours  dextrose 5%. 1000 milliLiter(s) (50 mL/Hr) IV Continuous <Continuous>  dextrose 50% Injectable 25 Gram(s) IV Push once  docusate sodium Liquid 100 milliGRAM(s) Oral daily  enoxaparin Injectable 40 milliGRAM(s) SubCutaneous every 24 hours  fludroCORTISONE 0.1 milliGRAM(s) Oral every 24 hours  Heparin 1000 Units / ml 2 milliLiter(s) 2 milliLiter(s) IV Push once  insulin glargine Injectable (LANTUS) 8 Unit(s) SubCutaneous at bedtime  insulin regular  human corrective regimen sliding scale   SubCutaneous every 6 hours  lidocaine 1% Injectable 50 milliLiter(s) Local Injection once  midodrine 15 milliGRAM(s) Oral every 8 hours  nystatin Cream 1 Application(s) Topical two times a day  senna 2 Tablet(s) Oral at bedtime  zinc oxide 20% Ointment 1 Application(s) Topical three times a day    MEDICATIONS  (PRN):  acetaminophen    Suspension .. 1000 milliGRAM(s) Oral every 8 hours PRN Moderate Pain (4 - 6)  dextrose 40% Gel 15 Gram(s) Oral once PRN Blood Glucose LESS THAN 70 milliGRAM(s)/deciliter  glucagon  Injectable 1 milliGRAM(s) IntraMuscular once PRN Glucose LESS THAN 70 milligrams/deciliter  LORazepam     Tablet 0.5 milliGRAM(s) Oral every 8 hours PRN Anxiety  sodium chloride 0.9% lock flush 10 milliLiter(s) IV Push every 1 hour PRN Pre/post blood products, medications, blood draw, and to maintain line patency    Physical Exam:  Gen: No Acute Distress  Pulm: trach in place, normal work of breathing  GI: soft, nontender, no rebound, no guarding.  PEG and ostomy in place  Ext: SCDs in place, wnl; 4/5 strength in upper extremities   [place]: in place     LABS:                        7.8    9.19  )-----------( 271      ( 24 May 2019 05:46 )             24.9     05-24    139  |  104  |  19  ----------------------------<  138<H>  3.9   |  26  |  0.48<L>    Ca    8.9      24 May 2019 05:46  Phos  4.2     05-24  Mg     1.9     05-24      PT/INR - ( 23 May 2019 06:22 )   PT: 13.4 sec;   INR: 1.18          PTT - ( 23 May 2019 06:22 )  PTT:30.9 sec  Urinalysis Basic - ( 23 May 2019 19:33 )    Color: Yellow / Appearance: Clear / S.020 / pH: x  Gluc: x / Ketone: NEGATIVE  / Bili: Negative / Urobili: 1.0 E.U./dL   Blood: x / Protein: 30 mg/dL / Nitrite: NEGATIVE   Leuk Esterase: Small / RBC: 5-10 /HPF / WBC Many /HPF   Sq Epi: x / Non Sq Epi: 0-5 /HPF / Bacteria: Present /HPF

## 2019-05-24 NOTE — PROGRESS NOTE ADULT - SUBJECTIVE AND OBJECTIVE BOX
NEUROLOGY CONSULT PROGRESS NOTE    INTERVAL HISTORY:    Family meeting held yesterday. Taken off of levophed, found to have MRSA in sputum. No change in neurological function/status. Continues to draw 200-230cc tidal volumes with 15/5 pressure support.     REVIEW OF SYSTEMS:  As per HPI, otherwise negative for Constitutional, Eyes, Ears/Nose/Mouth/Throat, Neck, Cardiovascular, Respiratory, Gastrointestinal, Genitourinary, Skin, Endocrine, Musculoskeletal, Psychiatric, and Hematologic/Lymphatic.    MEDICATIONS:  acetaminophen    Suspension .. 1000 milliGRAM(s) Oral every 8 hours PRN  ALPRAZolam 0.5 milliGRAM(s) Oral every 8 hours PRN  ascorbic acid 500 milliGRAM(s) Oral daily  calcium gluconate IVPB 1 Gram(s) IV Intermittent once  chlorhexidine 0.12% Liquid 15 milliLiter(s) Oral Mucosa every 12 hours  chlorhexidine 2% Cloths 1 Application(s) Topical <User Schedule>  dextrose 40% Gel 15 Gram(s) Oral once PRN  dextrose 5%. 1000 milliLiter(s) IV Continuous <Continuous>  dextrose 50% Injectable 25 Gram(s) IV Push once  docusate sodium Liquid 100 milliGRAM(s) Oral daily  enoxaparin Injectable 40 milliGRAM(s) SubCutaneous every 24 hours  fludroCORTISONE 0.1 milliGRAM(s) Oral every 24 hours  glucagon  Injectable 1 milliGRAM(s) IntraMuscular once PRN  Heparin 1000 Units / ml 2 milliLiter(s) 2 milliLiter(s) IV Push once  insulin glargine Injectable (LANTUS) 8 Unit(s) SubCutaneous at bedtime  insulin regular  human corrective regimen sliding scale   SubCutaneous every 6 hours  lidocaine 1% Injectable 50 milliLiter(s) Local Injection once  midodrine 15 milliGRAM(s) Oral every 8 hours  norepinephrine Infusion 0.05 MICROgram(s)/kG/Min IV Continuous <Continuous>  nystatin Cream 1 Application(s) Topical two times a day  pantoprazole   Suspension 40 milliGRAM(s) Oral daily  petrolatum Ophthalmic Ointment 1 Application(s) Both EYES three times a day  senna 2 Tablet(s) Oral at bedtime  sodium chloride 0.9% lock flush 10 milliLiter(s) IV Push every 1 hour PRN  zinc oxide 20% Ointment 1 Application(s) Topical three times a day    VITAL SIGNS:  Vital Signs Last 24 Hrs  T(C): 36.7 (24 May 2019 10:00), Max: 37.9 (23 May 2019 22:03)  T(F): 98 (24 May 2019 10:00), Max: 100.3 (23 May 2019 22:03)  HR: 66 (24 May 2019 09:13) (56 - 80)  BP: 106/56 (24 May 2019 08:00) (90/50 - 148/73)  BP(mean): 78 (24 May 2019 08:00) (60 - 110)  RR: 25 (24 May 2019 09:13) (16 - 51)  SpO2: 100% (24 May 2019 09:13) (79% - 100%)    PHYSICAL EXAMINATION:  Constitutional: Frail middle age female, intubated, NG tube in place  Eyes: Conjunctiva and sclera clear  Neurologic:  - Mental Status:  Awake and alert, able to follow commands as directed  - Cranial Nerves II-XII:    II: Pupils are equal, round, and reactive to light.  III, IV, VI:  EOMI, horizontal nystagmus with lateral gaze  V:  Facial sensation is intact  VII:  mildly weak eye closure R > L   VIII:  Hearing is intact to finger rub.  XI: Mildly improved shoulder shrug, able to move head  - Motor: Hypotrophic muscles. Upper extremities 4/5; RLE 3/5; LLE 2/5, Ankle 0/5 b/l  - Reflexes:  absent throughout   - Sensory:  Intact to light touch  - Gait: Deferred    LABS:                          7.8    9.19  )-----------( 271      ( 24 May 2019 05:46 )             24.9     05-24    139  |  104  |  19  ----------------------------<  138<H>  3.9   |  26  |  0.48<L>    Ca    8.9      24 May 2019 05:46  Phos  4.2     05-24  Mg     1.9     05-24      PT/INR - ( 23 May 2019 06:22 )   PT: 13.4 sec;   INR: 1.18          PTT - ( 23 May 2019 06:22 )  PTT:30.9 sec  Urinalysis Basic - ( 23 May 2019 19:33 )    Color: Yellow / Appearance: Clear / S.020 / pH: x  Gluc: x / Ketone: NEGATIVE  / Bili: Negative / Urobili: 1.0 E.U./dL   Blood: x / Protein: 30 mg/dL / Nitrite: NEGATIVE   Leuk Esterase: Small / RBC: 5-10 /HPF / WBC Many /HPF   Sq Epi: x / Non Sq Epi: 0-5 /HPF / Bacteria: Present /HPF    RADIOLOGY & ADDITIONAL STUDIES:

## 2019-05-24 NOTE — PROGRESS NOTE ADULT - PROBLEM SELECTOR PLAN 6
Iron panel showing ACD. s/p 1u pRBC's 5/18 and 1u pRBC's  5/23   - Currently no source of bleeding  - Maintain active T&S

## 2019-05-24 NOTE — PROGRESS NOTE ADULT - ATTENDING COMMENTS
Tidal volumes mildly better today  Recommend sitting up in chair position to optimize ventilation  Continue CPAP trials   Will continue to monitor neurologic status

## 2019-05-24 NOTE — PROGRESS NOTE ADULT - ASSESSMENT
58yo F HD23 with stage IV endometrial adenocarcinoma s/p staging surgery '18 and 1 round of chemotherapy 2/19 readmitted from [facility] with fever, previously admitted for management of symptomatic rectovaginal and enterovaginal fistulas. Presents on referral from [facility] with urosepsis. Rapid response called due to worsening respiratory status s/p intubation and MICU admission. She then was on regional GYN service however had respiratory weakness and was transferred to Medicine/tele HD9 until requiring intubation again HD10 and is now in MICU. 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 She was re-intubated on 5/13  and received plasmapheresis x5. Pt now has ET tube and PEG placed 5/21. She was made DNR/DNI 5/14 and on 5/17 found to have upper extremity DVT. Vitals stable this AM. Primary management per MICU.    1. Neuro: Weakness secondary to Guillain [place] versus exacerbation of AIDP- now status post IVIG x5 day course, s/p IVIG tx. 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 Pulm: Trach tube (5/21-,  DNR/DNI   3. Cardio: On Florinef     4. FEN/GI: PEG 5/21- Repletion of electrolytes prn   5. : [place] for overflow incontinence. Recurrent UTIs. Suprapubic catheter possibly in the future. F/u urology recs     6. ID: Afebrile currently. Switched to Linezolid 600mg BID (5/24-). Last urine culture 5/22 reveals VRE and Sputum culture 5/22 and Bronchial wash 5/21 reveals MRSA    s/p Vancomycin 1g (5/22-5/24), s/p Meropenem 5/22, s/p ertapenem ended 5/14. F/u ID recs   7. Endocrine: FS, ISS, Lantus 8u qhs (started 5/14), Humulin 4u q6hr, Metformin 1000mg BID held at this time   8. VTE prophylaxis - SCDs, Upper extremity superficial vein thrombosis noted 5/17, repeat doppler done 5/23-shows stable superficial thrombosis -Lovenox 60mg daily LE Dopplers negative   9. GYN malignancy  - s/p Anastrazole, s/p Megace 80mg BID x3 wks , s/p Tamoxifen 5/1-5/17 (stopped due to upper extremity DVT). Once patient recovers from acute neurologic issue will reassess cancer treatment options   -  Avoid immunotherapy at this time given associated risk of autoimmune disease   10. Derm: monitor sacrum for s/s of pressure ulcer, now stage 2- continue with zinc oxide   11. Follow up PT/OT recs   12. Social work/palliative: DNR/DNI. Interdisciplinary meeting was done on 5/23

## 2019-05-24 NOTE — PROGRESS NOTE ADULT - ASSESSMENT
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 She may have some component of critical illness myopathy at this point too. Neurologic prognosis remains guarded; while limb and respiratory muscle strength may improve gradually, she remains at high risk for complications in the meantime, and may not be able to be weaned from the ventilator in the near future.   Recommend:  -consider placing patient in a seated position when undergoing CPAP trial to help maximize tidal volumes   -Will continue to monitor neurologic function

## 2019-05-24 NOTE — PROGRESS NOTE ADULT - PROBLEM SELECTOR PLAN 7
Stage IV endometrial adenocarcinoma; Per GYN-ONC w/ interval increase in tumor burden. Pt was treated with Anastrazole and initiated 3 weeks of megace 80mg BID followed by 3 weeks of tamoxifen. Pt was on tamoxifen and developed a left cephalic v. thrombus. Tamoxifen was dc'd on 5/17   - Per GYN, no anticipation of continuing chemotherapy

## 2019-05-24 NOTE — PROGRESS NOTE ADULT - PROBLEM SELECTOR PLAN 2
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 Initial concern for adrenal insufficiency, stim test negative.  - Continue florinef 0.1mg qd   - C/w midodrine 15mg TID

## 2019-05-24 NOTE — PROGRESS NOTE ADULT - PROBLEM SELECTOR PLAN 3
Improving. 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 s/p 4 rounds of IVIG and 5 rounds of plasmapheresis (completed 5/21). 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  - C/w plan as per above problem 1 & 2

## 2019-05-24 NOTE — PROGRESS NOTE ADULT - PROBLEM SELECTOR PLAN 5
Resolved. hx of recurrent ESBL ecoli UTI and bacteremia. was initially admitted for septic shock 2/2 to ESBL ecoli bacteremia and UTI  - Completed tx w/ ertapenem on 5/14   - Repeat UCx now growing 50k  VRE

## 2019-05-24 NOTE — PROGRESS NOTE ADULT - SUBJECTIVE AND OBJECTIVE BOX
[facility] ACCEPTANCE FROM [facility] (MICU)  HOSPITAL COURSE:  58 yo F PMHx polio, breast cancer, Endometrial Cancer s/p exploratory laparotomy, enterolysis, SHAMIR, BSO, pelvic lymphadenectomy, low anterior resection mobilization of splenic flexure, end colostomy on 18, rectovaginal fistula, numerous admissions for urinary tract infection initially admitted for ESBL E coli bacteremia. Hospital course complicated by hypercapnic respiratory failure requiring multiple intubations 2/2 AIDP ( completed both IVIG and plasmapheresis), now s/p trach and PEG ( ), being treated for sepsis 2/2 MRSA in sputum and VRE in urine. She is now medically stable for stepdown to RM[facility]	    INTERVAL HPI/OVERNIGHT EVENTS:  Patient was seen and examined at bedside. Brother at bedside. Patient denies any f/c/n/v, abd pain, CP. Remembers examiner from 2 weeks ago. Understands plan to be moved to [facility]. Only concern was pt pointing if her tube feeds will be brought down with her.    VITAL SIGNS:  T(F): 99.5 (19 @ 14:00)  HR: 68 (19 @ 13:00)  BP: 107/59 (19 @ 13:00)  RR: 17 (19 @ 13:00)  SpO2: 100% (19 @ 13:00)  Wt(kg): --    PHYSICAL EXAM:    Constitutional: WDWN, NAD, trach/peg, resting comfortably  HEENT: PERRL, EOMI, no JVD, MMM  Respiratory: B/l rhonchi w/ expiratory wheezes  Cardiovascular: RRR, normal S1S2, no M/R/G  Gastrointestinal: soft, NTND, no masses palpable, BS normal x4, PEG in place C/D/I  Extremities: Warm, well perfused, 2+ radial and DP pulses b/l, no edema, no clubbing  Neurological: Awake alert oriented, unable to verbalize but follows commands, nods yes/no appropriately, 2-3/5 strength b/l LE, 4/5 strength b/l UE  Skin: Normal temperature, warm, dry    MEDICATIONS  (STANDING):  ascorbic acid 500 milliGRAM(s) Oral daily  calcium gluconate IVPB 1 Gram(s) IV Intermittent once  chlorhexidine 0.12% Liquid 15 milliLiter(s) Oral Mucosa every 12 hours  chlorhexidine 2% Cloths 1 Application(s) Topical <User Schedule>  dextrose 5%. 1000 milliLiter(s) (50 mL/Hr) IV Continuous <Continuous>  dextrose 50% Injectable 25 Gram(s) IV Push once  docusate sodium Liquid 100 milliGRAM(s) Oral daily  enoxaparin Injectable 40 milliGRAM(s) SubCutaneous every 24 hours  fludroCORTISONE 0.1 milliGRAM(s) Oral every 24 hours  Heparin 1000 Units / ml 2 milliLiter(s) 2 milliLiter(s) IV Push once  insulin glargine Injectable (LANTUS) 8 Unit(s) SubCutaneous at bedtime  insulin regular  human corrective regimen sliding scale   SubCutaneous every 6 hours  lidocaine 1% Injectable 50 milliLiter(s) Local Injection once  linezolid    Tablet 600 milliGRAM(s) Oral every 12 hours  midodrine 15 milliGRAM(s) Oral every 8 hours  norepinephrine Infusion 0.05 MICROgram(s)/kG/Min (5.466 mL/Hr) IV Continuous <Continuous>  nystatin Cream 1 Application(s) Topical two times a day  pantoprazole   Suspension 40 milliGRAM(s) Oral daily  petrolatum Ophthalmic Ointment 1 Application(s) Both EYES three times a day  senna 2 Tablet(s) Oral at bedtime  zinc oxide 20% Ointment 1 Application(s) Topical three times a day    MEDICATIONS  (PRN):  acetaminophen    Suspension .. 1000 milliGRAM(s) Oral every 8 hours PRN Moderate Pain (4 - 6)  dextrose 40% Gel 15 Gram(s) Oral once PRN Blood Glucose LESS THAN 70 milliGRAM(s)/deciliter  glucagon  Injectable 1 milliGRAM(s) IntraMuscular once PRN Glucose LESS THAN 70 milligrams/deciliter  LORazepam     Tablet 0.5 milliGRAM(s) Oral every 8 hours PRN Anxiety  sodium chloride 0.9% lock flush 10 milliLiter(s) IV Push every 1 hour PRN Pre/post blood products, medications, blood draw, and to maintain line patency      Allergies    No Known Allergies    Intolerances    IVIG PRODUCT IS PRIGIVEN OR GAMMUNEX (Unknown)      LABS:                        7.8    9.19  )-----------( 271      ( 24 May 2019 05:46 )             24.9     05-24    139  |  104  |  19  ----------------------------<  138<H>  3.9   |  26  |  0.48<L>    Ca    8.9      24 May 2019 05:46  Phos  4.2     05-24  Mg     1.9     -24      PT/INR - ( 23 May 2019 06:22 )   PT: 13.4 sec;   INR: 1.18          PTT - ( 23 May 2019 06:22 )  PTT:30.9 sec  Urinalysis Basic - ( 23 May 2019 19:33 )    Color: Yellow / Appearance: Clear / S.020 / pH: x  Gluc: x / Ketone: NEGATIVE  / Bili: Negative / Urobili: 1.0 E.U./dL   Blood: x / Protein: 30 mg/dL / Nitrite: NEGATIVE   Leuk Esterase: Small / RBC: 5-10 /HPF / WBC Many /HPF   Sq Epi: x / Non Sq Epi: 0-5 /HPF / Bacteria: Present /HPF      RADIOLOGY & ADDITIONAL TESTS: No new studies

## 2019-05-24 NOTE — PROGRESS NOTE ADULT - ASSESSMENT
58 yo F PMHx polio, breast cancer, DM, Endometrial Cancer s/p exploratory laparotomy, enterolysis, SHAMIR, BSO, pelvic lymphadenectomy, low anterior resection mobilization of splenic flexure, end colostomy on 7/30/18, rectovaginal fistula, numerous admissions for urinary tract infection presented to [facility] in the setting of urosepsis. Hospital course complicated by ESBL E coli bacteremia ( completed ertapenem on 5/14 ) and respiratory failure requiring multiple intubations 2/2 AIDP, now s/p trach and PEG ( 5/21), being treated for sepsis     CARDIOVASCULAR  #Hemodynamics  - Pt has been hard to wean off levophed. Pt's levo requirements over last 24h have been down. Her hypotension is most likely 2/2 autonomic dysfunction from AIDP and not septic shock   - Started on florinef 0.1mg qd and continued on midodrine 15mg TID  - Pt is mentating well, making good urine and has warm extremities    - Titrate MAP > 65     PULMONARY  #Acute hypercapnic respiratory failure   2/2 to demyelinating polyneuropathy (AIDP), patient has required 3 intubations this admission, now s/p trach (5/21)   - s/p 4 doses of IVIG finished 5/11 and 5 rounds of plasmapheresis ( finished 5/21)  -Continue w/ CPAP trial today      #Hx of urinary retention/overflow incontinence   - [place] changed on 5/17     GI  # PEG placement  - Pt s/p PEG placement 5/21   - Continue w/ Osmolite 1.2    Neurology  #AIDP  Patient w AIDP leading to respiratory compromise, previous EMG w demyelinating polyneuropathy  - s/p 4 rounds of IVIG and 5 rounds of plasmapheresis ( completed 5/21).  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  - Per Neuro, no plan for IVIG at this time    INFECTIOUS DISEASE  # SEPSIS  - Pt spiked to 101.6  yesterday ( 5/22).  CXR w/ no infiltrates. Lactate normal. UA not received in lab but UCx growing Enterococcus and Bronch wash growing MRSA   - Continue w/ Vancomycin ( 5/22- 5/28() for a total of 7 days. Meropenem was discontinued today   - Vanc trough at 5PM today   - BCx 5/22 NGTD    #hx of recurrent ESBL ecoli UTI and bacteremia  - previously on admission patient admitted to MICU in septic shock 2/2 to ESBL ecoli bacteremia and UTI  - Completed tx w/ ertapenem on 5/14   - Repeat UCx now growing 50k Enterococcus     HEME/ONC  # Anemia   - Hb 7 today; s/p 1u pRBC's 5/18  - Repeat Hb 6.7. Will transfused 1 unit   - Currently no source of bleeding  - Maintain active T&S    #Stage IV endometrial adenocarcinoma  Per GYN-ONC w/ interval increase in tumor burden. Pt was treated with Anastrazole and initiated 3 weeks of megace 80mg BID followed by 3 weeks of tamoxifen.   - Pt was on tamoxifen and developed a left cephalic v. thrombus. Tamoxifen was dc'd on 5/17   - GYN-ONC following, will be having a family meeting today regarding further options and GOC    #Cephalic vein thrombosis  - As above  - Was started on AC w/ Lovenox 60mg BID but given the location of thrombus, A/C was dc'd   - Repeat US of LUE today to evaluate for worsening clot     Endocrine  # r/o adrenal insufficiency  - Given increasing levophed requirements and possible autonomic dysfunction related to AIDP, tested for AM cortisol 5/22 which was 18  - Stim test was negative  - Started on florinef 0.1mg to wean off levophed    #Hyperglycemia  - Continue Lantus 8 U at bedtime   - SSI and FSG     FLUIDS/ELECTROLYTES/NUTRITION  -IVF: none  -Monitor, Replete to K>4 and Mg>2  -Diet: NPO w/ feeds     PROPHYLAXIS  -DVT: Lovenox, SCDs  -GI: none as on feeds    DISPO: MICU 58 yo F PMHx polio, breast cancer, DM, Endometrial Cancer s/p exploratory laparotomy, enterolysis, SHAMIR, BSO, pelvic lymphadenectomy, low anterior resection mobilization of splenic flexure, end colostomy on 7/30/18, rectovaginal fistula, numerous admissions for urinary tract infection presented to [facility] in the setting of urosepsis. Hospital course complicated by ESBL E coli bacteremia ( completed ertapenem on 5/14 ) and respiratory failure requiring multiple intubations 2/2 AIDP, now s/p trach and PEG ( 5/21), being treated for sepsis. She is now medically stable for transfer to [facility]     CARDIOVASCULAR  #Hemodynamics  - Hemodynamically stable, mentating well, making good urine and has warm extremities    - Pt had previously been on levophed for autonomic dysfunction 2/2 AIDP  - Continue florinef 0.1mg qd and midodrine 15mg TID      PULMONARY  #Acute hypercapnic respiratory failure   2/2 to demyelinating polyneuropathy (AIDP), patient has required 3 intubations this admission, now s/p trach (5/21)   - s/p 4 doses of IVIG finished 5/11 and 5 rounds of plasmapheresis ( finished 5/21)  -Continue w/ CPAP trial today      #Hx of urinary retention/overflow incontinence   - [place] changed on 5/17     GI  # PEG placement  - Pt s/p PEG placement 5/21   - Continue w/ Osmolite 1.2    Neurology  #AIDP  Patient w AIDP leading to respiratory compromise, previous EMG w demyelinating polyneuropathy  - s/p 4 rounds of IVIG and 5 rounds of plasmapheresis ( completed 5/21).  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  - Per Neuro, no plan for IVIG at this time  - Continue w/ florinef and midodrine for autonomic insufficiency 2/2 AIDP     INFECTIOUS DISEASE  # SEPSIS  - Pt spiked to 101.6  ( 5/22). UA  positive, UCx growing Enterococcus and Bronch wash growing MRSA   - Was on vancomycin ( 5/22- 5/24) which was dc'd today as Cx positive for VRE   - Continue linezolid for a total of 7 days ( 5/24-5/30)   - BCx 5/22 NGTD    #hx of recurrent ESBL ecoli UTI and bacteremia  - previously on admission patient admitted to MICU in septic shock 2/2 to ESBL ecoli bacteremia and UTI  - Completed tx w/ ertapenem on 5/14   - Repeat UCx now growing 50k  VRE    HEME/ONC  # Anemia   - Hb 7.8 today; s/p 1u pRBC's 5/18 and 1u pRBC's  5/23   - Currently no source of bleeding  - Maintain active T&S    #Stage IV endometrial adenocarcinoma  Per GYN-ONC w/ interval increase in tumor burden. Pt was treated with Anastrazole and initiated 3 weeks of megace 80mg BID followed by 3 weeks of tamoxifen.   - Pt was on tamoxifen and developed a left cephalic v. thrombus. Tamoxifen was dc'd on 5/17   - Per GYN, no anticipation of continuing chemotherapy     #Cephalic vein thrombosis  - As above  - Was started on AC w/ Lovenox 60mg BID but given the location of thrombus, A/C was dc'd   - Repeat US of LUE 5/23 showed stable thrombus     Endocrine  # r/o adrenal insufficiency  - Given increasing levophed requirements and possible autonomic dysfunction related to AIDP, tested for AM cortisol 5/22 which was 18  - Stim test was negative but pt started on florinef for pressure support in the setting of autonomic insufficiency 2/2 AIDP     #Hyperglycemia  - Continue Lantus 8 U at bedtime   - SSI and FSG     Psych   # Anxiety   - 2/2 ongoing medical issues and prognosis  - Continue w/ Ativan 0.5 mg q6h PRN  - Psych consulted. Appreciate further recs     FLUIDS/ELECTROLYTES/NUTRITION  -IVF: none  -Monitor, Replete to K>4 and Mg>2  -Diet: NPO w/ feeds     PROPHYLAXIS  -DVT: Lovenox, SCDs  -GI: none as on feeds    DISPO: MICU 58 yo F PMHx polio, breast cancer, DM, Endometrial Cancer s/p exploratory laparotomy, enterolysis, SHAMIR, BSO, pelvic lymphadenectomy, low anterior resection mobilization of splenic flexure, end colostomy on 7/30/18, rectovaginal fistula, numerous admissions for urinary tract infection presented to [facility] in the setting of urosepsis. Hospital course complicated by ESBL E coli bacteremia ( completed ertapenem on 5/14 ) and respiratory failure requiring multiple intubations 2/2 AIDP, now s/p trach and PEG ( 5/21), being treated for sepsis. She is now medically stable for transfer to [facility]     CARDIOVASCULAR  #Hemodynamics  - Hemodynamically stable, mentating well, making good urine and has warm extremities    - Pt had previously been on levophed for autonomic dysfunction 2/2 AIDP  - Continue florinef 0.1mg qd and midodrine 15mg TID      PULMONARY  #Acute hypercapnic respiratory failure   2/2 to demyelinating polyneuropathy (AIDP), patient has required 3 intubations this admission, now s/p trach (5/21)   - s/p 4 doses of IVIG finished 5/11 and 5 rounds of plasmapheresis ( finished 5/21)  -Continue w/ CPAP trial today      #Hx of urinary retention/overflow incontinence   - [place] changed on 5/17     GI  # PEG placement  - Pt s/p PEG placement 5/21   - Continue w/ Osmolite 1.2    Neurology  #AIDP  Patient w AIDP leading to respiratory compromise, previous EMG w demyelinating polyneuropathy  - s/p 4 rounds of IVIG and 5 rounds of plasmapheresis ( completed 5/21).  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  - Per Neuro, no plan for IVIG at this time  - Continue w/ florinef and midodrine for autonomic insufficiency 2/2 AIDP     INFECTIOUS DISEASE  # SEPSIS  - Pt spiked to 101.6  ( 5/22). UA  positive, UCx growing Enterococcus and Bronch wash growing MRSA   - Was on vancomycin ( 5/22- 5/24) which was dc'd today as Cx positive for VRE   - Continue linezolid for a total of 7 days ( 5/24-5/30)   - BCx 5/22 NGTD    #hx of recurrent ESBL ecoli UTI and bacteremia  - previously on admission patient admitted to MICU in septic shock 2/2 to ESBL ecoli bacteremia and UTI  - Completed tx w/ ertapenem on 5/14   - Repeat UCx now growing 50k  VRE    HEME/ONC  # Anemia   - Hb 7.8 today; s/p 1u pRBC's 5/18 and 1u pRBC's  5/23   - Currently no source of bleeding  - Maintain active T&S    #Stage IV endometrial adenocarcinoma  Per GYN-ONC w/ interval increase in tumor burden. Pt was treated with Anastrazole and initiated 3 weeks of megace 80mg BID followed by 3 weeks of tamoxifen.   - Pt was on tamoxifen and developed a left cephalic v. thrombus. Tamoxifen was dc'd on 5/17   - Per GYN, no anticipation of continuing chemotherapy     #Cephalic vein thrombosis  - As above  - Was started on AC w/ Lovenox 60mg BID but given the location of thrombus, A/C was dc'd   - Repeat US of LUE 5/23 showed stable thrombus     Endocrine  # r/o adrenal insufficiency  - Given increasing levophed requirements and possible autonomic dysfunction related to AIDP, tested for AM cortisol 5/22 which was 18  - Stim test was negative but pt started on florinef for pressure support in the setting of autonomic insufficiency 2/2 AIDP     #Hyperglycemia  - Continue Lantus 8 U at bedtime   - SSI and FSG     Psych   # Anxiety   - 2/2 ongoing medical issues and prognosis  - Continue w/ Ativan 0.5 mg q6h PRN  - Psych consulted. Appreciate further recs     FLUIDS/ELECTROLYTES/NUTRITION  -IVF: none  -Monitor, Replete to K>4 and Mg>2  -Diet: NPO w/ feeds     PROPHYLAXIS  -DVT: Lovenox, SCDs  -GI: none as on feeds    DISPO: F  CODE: DNR/DNI

## 2019-05-24 NOTE — PROGRESS NOTE ADULT - PROBLEM SELECTOR PLAN 1
2/2 to demyelinating polyneuropathy (AIDP), patient has required 3 intubations this admission, now s/p trach (5/21) and s/p 4 doses of IVIG finished 5/11 and 5 rounds of plasmapheresis ( finished 5/21)  - C/w mechanical ventilation  - C/w frequent suctioning

## 2019-05-24 NOTE — PROGRESS NOTE ADULT - ASSESSMENT
58 yo F PMHx polio, breast cancer, DM, Endometrial Cancer s/p exploratory laparotomy, enterolysis, SHAMIR, BSO, pelvic lymphadenectomy, low anterior resection mobilization of splenic flexure, end colostomy on 7/30/18, rectovaginal fistula, numerous admissions for urinary tract infection presented to [facility] in the setting of urosepsis. Hospital course complicated by ESBL E coli bacteremia ( completed ertapenem on 5/14 ) and respiratory failure requiring multiple intubations 2/2 AIDP, now s/p trach and PEG ( 5/21), being treated for sepsis     CARDIOVASCULAR  #Hemodynamics  - Pt has been hard to wean off levophed. Pt's levo requirements over last 24h have been down. Her hypotension is most likely 2/2 autonomic dysfunction from AIDP and not septic shock   - Started on florinef 0.1mg qd and continued on midodrine 15mg TID  - Pt is mentating well, making good urine and has warm extremities    - Titrate MAP > 65     PULMONARY  #Acute hypercapnic respiratory failure   2/2 to demyelinating polyneuropathy (AIDP), patient has required 3 intubations this admission, now s/p trach (5/21)   - s/p 4 doses of IVIG finished 5/11 and 5 rounds of plasmapheresis ( finished 5/21)  -Continue w/ CPAP trial today      #Hx of urinary retention/overflow incontinence   - Westbrook changed on 5/17     GI  # PEG placement  - Pt s/p PEG placement 5/21   - Continue w/ Osmolite 1.2    Neurology  #AIDP  Patient w AIDP leading to respiratory compromise, previous EMG w demyelinating polyneuropathy  - s/p 4 rounds of IVIG and 5 rounds of plasmapheresis ( completed 5/21).  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  - Per Neuro, no plan for IVIG at this time    INFECTIOUS DISEASE  # SEPSIS  - Pt spiked to 101.6  yesterday ( 5/22).  CXR w/ no infiltrates. Lactate normal. UA not received in lab but UCx growing Enterococcus and Bronch wash growing MRSA   - Continue w/ Vancomycin ( 5/22- 5/28() for a total of 7 days. Meropenem was discontinued today   - Vanc trough at 5PM today   - BCx 5/22 NGTD    #hx of recurrent ESBL ecoli UTI and bacteremia  - previously on admission patient admitted to MICU in septic shock 2/2 to ESBL ecoli bacteremia and UTI  - Completed tx w/ ertapenem on 5/14   - Repeat UCx now growing 50k Enterococcus     HEME/ONC  # Anemia   - Hb 7 today; s/p 1u pRBC's 5/18  - Repeat Hb 6.7. Will transfused 1 unit   - Currently no source of bleeding  - Maintain active T&S    #Stage IV endometrial adenocarcinoma  Per GYN-ONC w/ interval increase in tumor burden. Pt was treated with Anastrazole and initiated 3 weeks of megace 80mg BID followed by 3 weeks of tamoxifen.   - Pt was on tamoxifen and developed a left cephalic v. thrombus. Tamoxifen was dc'd on 5/17   - GYN-ONC following, will be having a family meeting today regarding further options and GOC    #Cephalic vein thrombosis  - As above  - Was started on AC w/ Lovenox 60mg BID but given the location of thrombus, A/C was dc'd   - Repeat US of LUE today to evaluate for worsening clot     Endocrine  # r/o adrenal insufficiency  - Given increasing levophed requirements and possible autonomic dysfunction related to AIDP, tested for AM cortisol 5/22 which was 18  - Stim test was negative  - Started on florinef 0.1mg to wean off levophed    #Hyperglycemia  - Continue Lantus 8 U at bedtime   - SSI and FSG     FLUIDS/ELECTROLYTES/NUTRITION  -IVF: none  -Monitor, Replete to K>4 and Mg>2  -Diet: NPO w/ feeds     PROPHYLAXIS  -DVT: Lovenox, SCDs  -GI: none as on feeds    [facility].

## 2019-05-24 NOTE — PROGRESS NOTE ADULT - PROBLEM SELECTOR PLAN 8
Hx of urinary retention/overflow incontinence   - [place]brook changed on 5/17     #PEG placement  - Pt s/p PEG placement 5/21   - Continue w/ Osmolite 1.2     #Cephalic vein thrombosis  - As above  - Was started on AC w/ Lovenox 60mg BID but given the location of thrombus, A/C was dc'd   - Repeat US of LUE 5/23 showed stable thrombus     #Hyperglycemia  - Continue Lantus 8 U at bedtime   - SSI and FSG     #Anxiety   - 2/2 ongoing medical issues and prognosis  - Continue w/ Ativan 0.5 mg q6h PRN  - Psych consulted. Appreciate further recs

## 2019-05-24 NOTE — CHART NOTE - NSCHARTNOTEFT_GEN_A_CORE
Infectious Diseases Anti-infective Approval Note    Medication:  linezolid   Dose:  600 mg  Route:  IV/PO/PEG  Frequency:  q12  Duration:  7 days     Dose may be adjusted as needed for alterations in renal function.    *THIS IS NOT AN INFECTIOUS DISEASES CONSULTATION*

## 2019-05-24 NOTE — PROGRESS NOTE ADULT - SUBJECTIVE AND OBJECTIVE BOX
Pt seen and examined at bedside. Pt with trach and has no acute complaints.     T(F): 98.3 (19 @ 06:00), Max: 100.3 (19 @ 22:03)  HR: 56 (19 @ 07:00) (56 - 80)  BP: 97/52 (19 @ 06:00) (90/50 - 148/73)  RR: 37 (19 @ 07:00) (16 - 51)  SpO2: 100% (19 @ 07:00) (79% - 100%)  Wt(kg): --  I&O's Summary    23 May 2019 07:01  -  24 May 2019 07:00  --------------------------------------------------------  IN: 1468.5 mL / OUT: 1915 mL / NET: -446.5 mL    MEDICATIONS  (STANDING):  ascorbic acid 500 milliGRAM(s) Oral daily  calcium gluconate IVPB 1 Gram(s) IV Intermittent once  chlorhexidine 0.12% Liquid 15 milliLiter(s) Oral Mucosa every 12 hours  chlorhexidine 2% Cloths 1 Application(s) Topical <User Schedule>  dextrose 5%. 1000 milliLiter(s) (50 mL/Hr) IV Continuous <Continuous>  dextrose 50% Injectable 25 Gram(s) IV Push once  docusate sodium Liquid 100 milliGRAM(s) Oral daily  enoxaparin Injectable 40 milliGRAM(s) SubCutaneous every 24 hours  fludroCORTISONE 0.1 milliGRAM(s) Oral every 24 hours  Heparin 1000 Units / ml 2 milliLiter(s) 2 milliLiter(s) IV Push once  insulin glargine Injectable (LANTUS) 8 Unit(s) SubCutaneous at bedtime  insulin regular  human corrective regimen sliding scale   SubCutaneous every 6 hours  lidocaine 1% Injectable 50 milliLiter(s) Local Injection once  magnesium sulfate  IVPB 1 Gram(s) IV Intermittent once  midodrine 15 milliGRAM(s) Oral every 8 hours  norepinephrine Infusion 0.05 MICROgram(s)/kG/Min (5.466 mL/Hr) IV Continuous <Continuous>  nystatin Cream 1 Application(s) Topical two times a day  pantoprazole   Suspension 40 milliGRAM(s) Oral daily  petrolatum Ophthalmic Ointment 1 Application(s) Both EYES three times a day  potassium chloride   Powder 20 milliEquivalent(s) Oral once  senna 2 Tablet(s) Oral at bedtime  zinc oxide 20% Ointment 1 Application(s) Topical three times a day    MEDICATIONS  (PRN):  acetaminophen    Suspension .. 1000 milliGRAM(s) Oral every 8 hours PRN Moderate Pain (4 - 6)  ALPRAZolam 0.5 milliGRAM(s) Oral every 8 hours PRN anxiety  dextrose 40% Gel 15 Gram(s) Oral once PRN Blood Glucose LESS THAN 70 milliGRAM(s)/deciliter  glucagon  Injectable 1 milliGRAM(s) IntraMuscular once PRN Glucose LESS THAN 70 milligrams/deciliter  sodium chloride 0.9% lock flush 10 milliLiter(s) IV Push every 1 hour PRN Pre/post blood products, medications, blood draw, and to maintain line patency    Physical Exam:  Gen: No Acute Distress  Pulm: trach in place, normal work of breathing  GI: soft, nontender, no rebound, no guarding.  PEG and ostomy in place  Ext: SCDs in place, wnl; 4/5 strength in upper extremities  [place]brook: in place     LABS:                        7.8    9.19  )-----------( 271      ( 24 May 2019 05:46 )             24.9     05-24    139  |  104  |  19  ----------------------------<  138<H>  3.9   |  26  |  0.48<L>    Ca    8.9      24 May 2019 05:46  Phos  4.2     05-24  Mg     1.9     05-24      PT/INR - ( 23 May 2019 06:22 )   PT: 13.4 sec;   INR: 1.18          PTT - ( 23 May 2019 06:22 )  PTT:30.9 sec  Urinalysis Basic - ( 23 May 2019 19:33 )    Color: Yellow / Appearance: Clear / S.020 / pH: x  Gluc: x / Ketone: NEGATIVE  / Bili: Negative / Urobili: 1.0 E.U./dL   Blood: x / Protein: 30 mg/dL / Nitrite: NEGATIVE   Leuk Esterase: Small / RBC: 5-10 /HPF / WBC Many /HPF   Sq Epi: x / Non Sq Epi: 0-5 /HPF / Bacteria: Present /HPF        RADIOLOGY & ADDITIONAL TESTS:

## 2019-05-24 NOTE — PROGRESS NOTE ADULT - SUBJECTIVE AND OBJECTIVE BOX
TRANSFER NOTE FROM [facility] TO [facility]:   59F PMHx polio, breast cancer, endometrial cancer s/p exploratory laparotomy, enterolysis, SHAMIR, BSO, pelvic lymphadenectomy, low anterior resection mobilization of splenic flexure, end colostomy on 18, rectovaginal fistula, numerous admissions for urinary tract infection initially admitted for ESBL E coli UTI. Hospital course complicated by ESBL E coli bacteremia (completed ertapenem on ) and respiratory failure requiring multiple intubations 2/2 AIDP, now s/p trach and PEG ( ), being treated for sepsis.     INTERVAL HPI/OVERNIGHT EVENTS: KESHAWN    SUBJECTIVE: Patient seen and examined at bedside. Pt very     OBJECTIVE:    VITAL SIGNS:  ICU Vital Signs Last 24 Hrs  T(C): 36.8 (24 May 2019 06:00), Max: 37.9 (23 May 2019 22:03)  T(F): 98.3 (24 May 2019 06:00), Max: 100.3 (23 May 2019 22:03)  HR: 62 (24 May 2019 06:00) (60 - 80)  BP: 97/52 (24 May 2019 06:00) (90/50 - 148/73)  BP(mean): 74 (24 May 2019 06:00) (60 - 112)  RR: 42 (24 May 2019 06:00) (14 - 51)  SpO2: 100% (24 May 2019 06:00) (79% - 100%)    Mode: AC/ CMV (Assist Control/ Continuous Mandatory Ventilation), RR (machine): 12, TV (machine): 320, FiO2: 40, PEEP: 5, ITime: 1, MAP: 7.6, PIP: 19      19 @ 07:01  -  19 @ 07:00  --------------------------------------------------------  IN: 1712.5 mL / OUT: 2415 mL / NET: -702.5 mL    19 @ 07:01  19 @ 13:47  --------------------------------------------------------  IN: 588 mL / OUT: 585 mL / NET: 3 mL      PHYSICAL EXAM:    General: NAD  HEENT: NC/AT; PERRL, clear conjunctiva  Neck: supple  Respiratory: CTA b/l  Cardiovascular: +S1/S2; RRR  Abdomen: soft, NT/ND; +BS x4  Extremities: WWP, 2+ peripheral pulses b/l; no LE edema  Skin: normal color and turgor; no rash  Neurological:    MEDICATIONS:  MEDICATIONS  (STANDING):  ascorbic acid 500 milliGRAM(s) Oral daily  calcium gluconate IVPB 1 Gram(s) IV Intermittent once  chlorhexidine 0.12% Liquid 15 milliLiter(s) Oral Mucosa every 12 hours  chlorhexidine 2% Cloths 1 Application(s) Topical <User Schedule>  dextrose 5%. 1000 milliLiter(s) (50 mL/Hr) IV Continuous <Continuous>  dextrose 50% Injectable 25 Gram(s) IV Push once  docusate sodium Liquid 100 milliGRAM(s) Oral daily  enoxaparin Injectable 40 milliGRAM(s) SubCutaneous every 24 hours  fludroCORTISONE 0.1 milliGRAM(s) Oral every 24 hours  Heparin 1000 Units / ml 2 milliLiter(s) 2 milliLiter(s) IV Push once  insulin glargine Injectable (LANTUS) 8 Unit(s) SubCutaneous at bedtime  insulin regular  human corrective regimen sliding scale   SubCutaneous every 6 hours  lidocaine 1% Injectable 50 milliLiter(s) Local Injection once  midodrine 15 milliGRAM(s) Oral every 8 hours  norepinephrine Infusion 0.05 MICROgram(s)/kG/Min (5.466 mL/Hr) IV Continuous <Continuous>  nystatin Cream 1 Application(s) Topical two times a day  pantoprazole   Suspension 40 milliGRAM(s) Oral daily  petrolatum Ophthalmic Ointment 1 Application(s) Both EYES three times a day  senna 2 Tablet(s) Oral at bedtime  zinc oxide 20% Ointment 1 Application(s) Topical three times a day    MEDICATIONS  (PRN):  acetaminophen    Suspension .. 1000 milliGRAM(s) Oral every 8 hours PRN Moderate Pain (4 - 6)  ALPRAZolam 0.5 milliGRAM(s) Oral every 8 hours PRN anxiety  dextrose 40% Gel 15 Gram(s) Oral once PRN Blood Glucose LESS THAN 70 milliGRAM(s)/deciliter  glucagon  Injectable 1 milliGRAM(s) IntraMuscular once PRN Glucose LESS THAN 70 milligrams/deciliter  sodium chloride 0.9% lock flush 10 milliLiter(s) IV Push every 1 hour PRN Pre/post blood products, medications, blood draw, and to maintain line patency      ALLERGIES:  Allergies    No Known Allergies    Intolerances    IVIG PRODUCT IS PRIGIVEN OR GAMMUNEX (Unknown)      LABS:                        7.8    9.19  )-----------( 271      ( 24 May 2019 05:46 )             24.9     05-23    139  |  100  |  19  ----------------------------<  164<H>  3.4<L>   |  28  |  0.48<L>    Ca    9.3      23 May 2019 06:22  Mg     2.0     05-23      PT/INR - ( 23 May 2019 06:22 )   PT: 13.4 sec;   INR: 1.18          PTT - ( 23 May 2019 06:22 )  PTT:30.9 sec  Urinalysis Basic - ( 23 May 2019 19:33 )    Color: Yellow / Appearance: Clear / S.020 / pH: x  Gluc: x / Ketone: NEGATIVE  / Bili: Negative / Urobili: 1.0 E.U./dL   Blood: x / Protein: 30 mg/dL / Nitrite: NEGATIVE   Leuk Esterase: Small / RBC: 5-10 /HPF / WBC Many /HPF   Sq Epi: x / Non Sq Epi: 0-5 /HPF / Bacteria: Present /HPF        RADIOLOGY & ADDITIONAL TESTS: Reviewed.

## 2019-05-24 NOTE — PROGRESS NOTE ADULT - PROBLEM SELECTOR PLAN 4
Pt spiked to 101.6  ( 5/22). UA  positive, UCx growing Enterococcus and Bronch wash growing MRSA; Was on vancomycin ( 5/22- 5/24) d/c due to Cx positive for VRE.  - Continue linezolid for a total of 7 days (5/24 - 5/30)   - BCx 5/22 NGTD

## 2019-05-24 NOTE — PROGRESS NOTE ADULT - ASSESSMENT
60yo F HD23 with stage IV endometrial adenocarcinoma s/p staging surgery '18 and 1 round of chemotherapy 2/19 readmitted from [facility] with fever, previously admitted for management of symptomatic rectovaginal and enterovaginal fistulas. Presents on referral from [facility] with urosepsis. Rapid response called due to worsening respiratory status s/p intubation and MICU admission. She then was on regional GYN service however had respiratory weakness and was transferred to Medicine/tele HD9 until requiring intubation again HD10 and is now in MICU. 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 She was re-intubated on 5/13  and received plasmapheresis x5. Pt now has ET tube and PEG placed 5/21. She was made DNR/DNI 5/14 and on 5/17 found to have upper extremity DVT. Vitals stable this AM.     Management per MICU.    1. Neuro: Weakness secondary to Guillain [place] versus exacerbation of AIDP- now status post IVIG x5 day course, s/p IVIG tx. 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 Pulm: Trach tube (5/21-,  DNR/DNI   3. Cardio: On Florinef     4. FEN/GI: PEG 5/21- Repletion of electrolytes prn   5. : [place]brook for overflow incontinence. Recurrent UTIs- last urine culture 5/18: 4,000 yeast (final). Suprapubic catheter possibly in the future. F/u urology recs     6. ID: Afebrile currently. Vancomycin 1g (5/22-) Vancomycin trough 16.5  s/p Meropenem 5/22, s/p ertapenem ended 5/14. F/u ID recs   7. Endocrine: FS, ISS, Lantus 8u qhs (started 5/14), Humulin 4u q6hr, Metformin 1000mg BID held at this time   8. VTE prophylaxis - SCDs, Upper extremity superficial vein thrombosis noted 5/17, repeat doppler done 5/23-shows stable superficial thrombosis -Lovenox 60mg daily LE Dopplers negative   9. GYN malignancy  - s/p Anastrazole, s/p Megace 80mg BID x3 wks , s/p Tamoxifen 5/1-5/17 (stopped due to upper extremity DVT). Once patient recovers from acute neurologic issue will reassess cancer treatment options   -  Avoid immunotherapy at this time given associated risk of autoimmune disease   10. Derm: monitor sacrum for s/s of pressure ulcer, now stage 2- continue with zinc oxide   11. Follow up PT/OT recs   12. Social work/palliative: DNR/DNI. Interdisciplinary meeting was done on 5/23

## 2019-05-24 NOTE — PROGRESS NOTE ADULT - ASSESSMENT
58 yo F PMHx polio, breast cancer, DM, Endometrial Cancer s/p exploratory laparotomy, enterolysis, SHAMIR, BSO, pelvic lymphadenectomy, low anterior resection mobilization of splenic flexure, end colostomy on 7/30/18, rectovaginal fistula, numerous admissions for urinary tract infection presented to [facility] in the setting of urosepsis. Hospital course complicated by ESBL E coli bacteremia ( completed ertapenem on 5/14 ) and respiratory failure requiring multiple intubations 2/2 AIDP, now s/p trach and PEG ( 5/21), being treated for sepsis. S 58 yo F PMHx polio, breast cancer, DM, Endometrial Cancer s/p exploratory laparotomy, enterolysis, SHAMIR, BSO, pelvic lymphadenectomy, low anterior resection mobilization of splenic flexure, end colostomy on 7/30/18, rectovaginal fistula, numerous admissions for urinary tract infection presented to [facility] in the setting of urosepsis. Hospital course complicated by ESBL E coli bacteremia ( completed ertapenem on 5/14 ) and respiratory failure requiring multiple intubations 2/2 AIDP, now s/p trach and PEG ( 5/21), being treated for sepsis 2/2 MRSA PNA w/ linezolid

## 2019-05-24 NOTE — PROGRESS NOTE ADULT - PROBLEM SELECTOR PLAN 10
1) PCP Contacted on Admission: (Y/N) --> Name & Phone #: Dr. Patricio Quinn  2) Date of Contact with PCP:  3) PCP Contacted at Discharge: (Y/N, N/A)  4) Summary of Handoff Given to PCP:   5) Post-Discharge Appointment Date and Location:

## 2019-05-24 NOTE — PROGRESS NOTE ADULT - SUBJECTIVE AND OBJECTIVE BOX
INTERVAL HPI/OVERNIGHT EVENTS:    SUBJECTIVE: Patient seen and examined at bedside.     CONSTITUTIONAL: No weakness, fevers or chills  EYES/ENT: No visual changes;  No vertigo or throat pain   NECK: No pain or stiffness  RESPIRATORY: No cough, wheezing, hemoptysis; No shortness of breath  CARDIOVASCULAR: No chest pain or palpitations  GASTROINTESTINAL: No abdominal or epigastric pain. No nausea, vomiting, or hematemesis; No diarrhea or constipation. No melena or hematochezia.  GENITOURINARY: No dysuria, frequency or hematuria  NEUROLOGICAL: No numbness or weakness  SKIN: No itching, rashes    OBJECTIVE:    VITAL SIGNS:  ICU Vital Signs Last 24 Hrs  T(C): 36.8 (24 May 2019 06:00), Max: 37.9 (23 May 2019 22:03)  T(F): 98.3 (24 May 2019 06:00), Max: 100.3 (23 May 2019 22:03)  HR: 62 (24 May 2019 06:00) (60 - 80)  BP: 97/52 (24 May 2019 06:00) (90/50 - 148/73)  BP(mean): 74 (24 May 2019 06:00) (60 - 112)  ABP: --  ABP(mean): --  RR: 42 (24 May 2019 06:00) (14 - 51)  SpO2: 100% (24 May 2019 06:00) (79% - 100%)    Mode: AC/ CMV (Assist Control/ Continuous Mandatory Ventilation), RR (machine): 12, TV (machine): 320, FiO2: 40, PEEP: 5, ITime: 1, MAP: 7.6, PIP: 19     @ 07: @ 07:00  --------------------------------------------------------  IN: 2448 mL / OUT: 850 mL / NET: 1598 mL     @ 07: @ 06:33  --------------------------------------------------------  IN: 1468.5 mL / OUT: 1915 mL / NET: -446.5 mL      CAPILLARY BLOOD GLUCOSE      POCT Blood Glucose.: 131 mg/dL (24 May 2019 05:11)      PHYSICAL EXAM:    General: NAD  HEENT: NC/AT; PERRL, clear conjunctiva  Neck: supple  Respiratory: CTA b/l  Cardiovascular: +S1/S2; RRR  Abdomen: soft, NT/ND; +BS x4  Extremities: WWP, 2+ peripheral pulses b/l; no LE edema  Skin: normal color and turgor; no rash  Neurological:    MEDICATIONS:  MEDICATIONS  (STANDING):  ascorbic acid 500 milliGRAM(s) Oral daily  calcium gluconate IVPB 1 Gram(s) IV Intermittent once  chlorhexidine 0.12% Liquid 15 milliLiter(s) Oral Mucosa every 12 hours  chlorhexidine 2% Cloths 1 Application(s) Topical <User Schedule>  dextrose 5%. 1000 milliLiter(s) (50 mL/Hr) IV Continuous <Continuous>  dextrose 50% Injectable 25 Gram(s) IV Push once  docusate sodium Liquid 100 milliGRAM(s) Oral daily  enoxaparin Injectable 40 milliGRAM(s) SubCutaneous every 24 hours  fludroCORTISONE 0.1 milliGRAM(s) Oral every 24 hours  Heparin 1000 Units / ml 2 milliLiter(s) 2 milliLiter(s) IV Push once  insulin glargine Injectable (LANTUS) 8 Unit(s) SubCutaneous at bedtime  insulin regular  human corrective regimen sliding scale   SubCutaneous every 6 hours  lidocaine 1% Injectable 50 milliLiter(s) Local Injection once  midodrine 15 milliGRAM(s) Oral every 8 hours  norepinephrine Infusion 0.05 MICROgram(s)/kG/Min (5.466 mL/Hr) IV Continuous <Continuous>  nystatin Cream 1 Application(s) Topical two times a day  pantoprazole   Suspension 40 milliGRAM(s) Oral daily  petrolatum Ophthalmic Ointment 1 Application(s) Both EYES three times a day  senna 2 Tablet(s) Oral at bedtime  zinc oxide 20% Ointment 1 Application(s) Topical three times a day    MEDICATIONS  (PRN):  acetaminophen    Suspension .. 1000 milliGRAM(s) Oral every 8 hours PRN Moderate Pain (4 - 6)  ALPRAZolam 0.5 milliGRAM(s) Oral every 8 hours PRN anxiety  dextrose 40% Gel 15 Gram(s) Oral once PRN Blood Glucose LESS THAN 70 milliGRAM(s)/deciliter  glucagon  Injectable 1 milliGRAM(s) IntraMuscular once PRN Glucose LESS THAN 70 milligrams/deciliter  sodium chloride 0.9% lock flush 10 milliLiter(s) IV Push every 1 hour PRN Pre/post blood products, medications, blood draw, and to maintain line patency      ALLERGIES:  Allergies    No Known Allergies    Intolerances    IVIG PRODUCT IS PRIGIVEN OR GAMMUNEX (Unknown)      LABS:                        7.8    9.19  )-----------( 271      ( 24 May 2019 05:46 )             24.9     05-    139  |  100  |  19  ----------------------------<  164<H>  3.4<L>   |  28  |  0.48<L>    Ca    9.3      23 May 2019 06:22  Mg     2.0     -23      PT/INR - ( 23 May 2019 06:22 )   PT: 13.4 sec;   INR: 1.18          PTT - ( 23 May 2019 06:22 )  PTT:30.9 sec  Urinalysis Basic - ( 23 May 2019 19:33 )    Color: Yellow / Appearance: Clear / S.020 / pH: x  Gluc: x / Ketone: NEGATIVE  / Bili: Negative / Urobili: 1.0 E.U./dL   Blood: x / Protein: 30 mg/dL / Nitrite: NEGATIVE   Leuk Esterase: Small / RBC: 5-10 /HPF / WBC Many /HPF   Sq Epi: x / Non Sq Epi: 0-5 /HPF / Bacteria: Present /HPF        RADIOLOGY & ADDITIONAL TESTS: Reviewed. TRANSFER NOTE FROM [facility] TO [facility]:   60 yo F PMHx polio, breast cancer, Endometrial Cancer s/p exploratory laparotomy, enterolysis, SHAMIR, BSO, pelvic lymphadenectomy, low anterior resection mobilization of splenic flexure, end colostomy on 18, rectovaginal fistula, numerous admissions for urinary tract infection initially admitted for ESBL E . Hospital course complicated by ESBL E coli bacteremia ( completed ertapenem on  ) and respiratory failure requiring multiple intubations 2/2 AIDP, now s/p trach and PEG ( ), being treated for sepsis     INTERVAL HPI/OVERNIGHT EVENTS: KESHAWN    SUBJECTIVE: Patient seen and examined at bedside. Pt very     CONSTITUTIONAL: No weakness, fevers or chills  EYES/ENT: No visual changes;  No vertigo or throat pain   NECK: No pain or stiffness  RESPIRATORY: No cough, wheezing, hemoptysis; No shortness of breath  CARDIOVASCULAR: No chest pain or palpitations  GASTROINTESTINAL: No abdominal or epigastric pain. No nausea, vomiting, or hematemesis; No diarrhea or constipation. No melena or hematochezia.  GENITOURINARY: No dysuria, frequency or hematuria  NEUROLOGICAL: No numbness or weakness  SKIN: No itching, rashes    OBJECTIVE:    VITAL SIGNS:  ICU Vital Signs Last 24 Hrs  T(C): 36.8 (24 May 2019 06:00), Max: 37.9 (23 May 2019 22:03)  T(F): 98.3 (24 May 2019 06:00), Max: 100.3 (23 May 2019 22:03)  HR: 62 (24 May 2019 06:00) (60 - 80)  BP: 97/52 (24 May 2019 06:00) (90/50 - 148/73)  BP(mean): 74 (24 May 2019 06:00) (60 - 112)  ABP: --  ABP(mean): --  RR: 42 (24 May 2019 06:00) (14 - 51)  SpO2: 100% (24 May 2019 06:00) (79% - 100%)    Mode: AC/ CMV (Assist Control/ Continuous Mandatory Ventilation), RR (machine): 12, TV (machine): 320, FiO2: 40, PEEP: 5, ITime: 1, MAP: 7.6, PIP: 19    05-22 @ 07:01  -  05-23 @ 07:00  --------------------------------------------------------  IN: 2448 mL / OUT: 850 mL / NET: 1598 mL     @ 07:01   @ 06:33  --------------------------------------------------------  IN: 1468.5 mL / OUT: 1915 mL / NET: -446.5 mL      CAPILLARY BLOOD GLUCOSE      POCT Blood Glucose.: 131 mg/dL (24 May 2019 05:11)      PHYSICAL EXAM:    General: NAD  HEENT: NC/AT; PERRL, clear conjunctiva  Neck: supple  Respiratory: CTA b/l  Cardiovascular: +S1/S2; RRR  Abdomen: soft, NT/ND; +BS x4  Extremities: WWP, 2+ peripheral pulses b/l; no LE edema  Skin: normal color and turgor; no rash  Neurological:    MEDICATIONS:  MEDICATIONS  (STANDING):  ascorbic acid 500 milliGRAM(s) Oral daily  calcium gluconate IVPB 1 Gram(s) IV Intermittent once  chlorhexidine 0.12% Liquid 15 milliLiter(s) Oral Mucosa every 12 hours  chlorhexidine 2% Cloths 1 Application(s) Topical <User Schedule>  dextrose 5%. 1000 milliLiter(s) (50 mL/Hr) IV Continuous <Continuous>  dextrose 50% Injectable 25 Gram(s) IV Push once  docusate sodium Liquid 100 milliGRAM(s) Oral daily  enoxaparin Injectable 40 milliGRAM(s) SubCutaneous every 24 hours  fludroCORTISONE 0.1 milliGRAM(s) Oral every 24 hours  Heparin 1000 Units / ml 2 milliLiter(s) 2 milliLiter(s) IV Push once  insulin glargine Injectable (LANTUS) 8 Unit(s) SubCutaneous at bedtime  insulin regular  human corrective regimen sliding scale   SubCutaneous every 6 hours  lidocaine 1% Injectable 50 milliLiter(s) Local Injection once  midodrine 15 milliGRAM(s) Oral every 8 hours  norepinephrine Infusion 0.05 MICROgram(s)/kG/Min (5.466 mL/Hr) IV Continuous <Continuous>  nystatin Cream 1 Application(s) Topical two times a day  pantoprazole   Suspension 40 milliGRAM(s) Oral daily  petrolatum Ophthalmic Ointment 1 Application(s) Both EYES three times a day  senna 2 Tablet(s) Oral at bedtime  zinc oxide 20% Ointment 1 Application(s) Topical three times a day    MEDICATIONS  (PRN):  acetaminophen    Suspension .. 1000 milliGRAM(s) Oral every 8 hours PRN Moderate Pain (4 - 6)  ALPRAZolam 0.5 milliGRAM(s) Oral every 8 hours PRN anxiety  dextrose 40% Gel 15 Gram(s) Oral once PRN Blood Glucose LESS THAN 70 milliGRAM(s)/deciliter  glucagon  Injectable 1 milliGRAM(s) IntraMuscular once PRN Glucose LESS THAN 70 milligrams/deciliter  sodium chloride 0.9% lock flush 10 milliLiter(s) IV Push every 1 hour PRN Pre/post blood products, medications, blood draw, and to maintain line patency      ALLERGIES:  Allergies    No Known Allergies    Intolerances    IVIG PRODUCT IS PRIGIVEN OR GAMMUNEX (Unknown)      LABS:                        7.8    9.19  )-----------( 271      ( 24 May 2019 05:46 )             24.9     05-    139  |  100  |  19  ----------------------------<  164<H>  3.4<L>   |  28  |  0.48<L>    Ca    9.3      23 May 2019 06:22  Mg     2.0           PT/INR - ( 23 May 2019 06:22 )   PT: 13.4 sec;   INR: 1.18          PTT - ( 23 May 2019 06:22 )  PTT:30.9 sec  Urinalysis Basic - ( 23 May 2019 19:33 )    Color: Yellow / Appearance: Clear / S.020 / pH: x  Gluc: x / Ketone: NEGATIVE  / Bili: Negative / Urobili: 1.0 E.U./dL   Blood: x / Protein: 30 mg/dL / Nitrite: NEGATIVE   Leuk Esterase: Small / RBC: 5-10 /HPF / WBC Many /HPF   Sq Epi: x / Non Sq Epi: 0-5 /HPF / Bacteria: Present /HPF        RADIOLOGY & ADDITIONAL TESTS: Reviewed. TRANSFER NOTE FROM [facility] TO [facility]:   58 yo F PMHx polio, breast cancer, Endometrial Cancer s/p exploratory laparotomy, enterolysis, SHAMIR, BSO, pelvic lymphadenectomy, low anterior resection mobilization of splenic flexure, end colostomy on 18, rectovaginal fistula, numerous admissions for urinary tract infection initially admitted for ESBL E coli bacteremia. Hospital course complicated by hypercapnic respiratory failure requiring multiple intubations 2/2 AIDP ( completed both IVIG and plasmapheresis), now s/p trach and PEG ( ), being treated for sepsis 2/2 MRSA in sputum and VRE in urine. She is now medically stable for stepdown to [facility]	    INTERVAL HPI/OVERNIGHT EVENTS: KESHAWN    SUBJECTIVE: Patient seen and examined at bedside. Pt very emotional this morning about her prognosis. Complaining of generalized body ache. Given tylenol x 1    CONSTITUTIONAL: No weakness, fevers or chills  EYES/ENT: No visual changes;  No vertigo or throat pain   NECK: No pain or stiffness  RESPIRATORY: No cough, wheezing, hemoptysis; No shortness of breath  CARDIOVASCULAR: No chest pain or palpitations  GASTROINTESTINAL: No abdominal or epigastric pain. No nausea, vomiting, or hematemesis; No diarrhea or constipation. No melena or hematochezia.  GENITOURINARY: No dysuria, frequency or hematuria  NEUROLOGICAL: No numbness or weakness  SKIN: No itching, rashes    OBJECTIVE:    VITAL SIGNS:  ICU Vital Signs Last 24 Hrs  T(C): 36.8 (24 May 2019 06:00), Max: 37.9 (23 May 2019 22:03)  T(F): 98.3 (24 May 2019 06:00), Max: 100.3 (23 May 2019 22:03)  HR: 62 (24 May 2019 06:00) (60 - 80)  BP: 97/52 (24 May 2019 06:00) (90/50 - 148/73)  BP(mean): 74 (24 May 2019 06:00) (60 - 112)  ABP: --  ABP(mean): --  RR: 42 (24 May 2019 06:00) (14 - 51)  SpO2: 100% (24 May 2019 06:00) (79% - 100%)    Mode: AC/ CMV (Assist Control/ Continuous Mandatory Ventilation), RR (machine): 12, TV (machine): 320, FiO2: 40, PEEP: 5, ITime: 1, MAP: 7.6, PIP: 19     @ 07: @ 07:00  --------------------------------------------------------  IN: 2448 mL / OUT: 850 mL / NET: 1598 mL     @ 07: @ 06:33  --------------------------------------------------------  IN: 1468.5 mL / OUT: 1915 mL / NET: -446.5 mL      CAPILLARY BLOOD GLUCOSE      POCT Blood Glucose.: 131 mg/dL (24 May 2019 05:11)      PHYSICAL EXAM:  General: NAD, comfortable, on vent   HEENT: NCAT, PERRL, clear conjunctiva, no scleral icterus  Neck: supple, no JVD  Respiratory:  Mild crackles in upper lung lobes b/l  Cardiovascular: RRR, normal S1S2, no M/R/G  Vascular: 1+ radial and DP pulses  Abdomen: soft, NT/ND, bowel sounds present, L colostomy w pink stoma, no palpable masses, PEG tube on place ( w/ some dry blood around)   Extremities: warm to touch, no clubbing, cyanosis, or edema  Skin: Erythema over buttocks bilaterally   Neuro: Awake and alert, following all commands. 2/5 in LE b/l, 4/5 UE b/l  No focal deficits      MEDICATIONS:  MEDICATIONS  (STANDING):  ascorbic acid 500 milliGRAM(s) Oral daily  calcium gluconate IVPB 1 Gram(s) IV Intermittent once  chlorhexidine 0.12% Liquid 15 milliLiter(s) Oral Mucosa every 12 hours  chlorhexidine 2% Cloths 1 Application(s) Topical <User Schedule>  dextrose 5%. 1000 milliLiter(s) (50 mL/Hr) IV Continuous <Continuous>  dextrose 50% Injectable 25 Gram(s) IV Push once  docusate sodium Liquid 100 milliGRAM(s) Oral daily  enoxaparin Injectable 40 milliGRAM(s) SubCutaneous every 24 hours  fludroCORTISONE 0.1 milliGRAM(s) Oral every 24 hours  Heparin 1000 Units / ml 2 milliLiter(s) 2 milliLiter(s) IV Push once  insulin glargine Injectable (LANTUS) 8 Unit(s) SubCutaneous at bedtime  insulin regular  human corrective regimen sliding scale   SubCutaneous every 6 hours  lidocaine 1% Injectable 50 milliLiter(s) Local Injection once  midodrine 15 milliGRAM(s) Oral every 8 hours  norepinephrine Infusion 0.05 MICROgram(s)/kG/Min (5.466 mL/Hr) IV Continuous <Continuous>  nystatin Cream 1 Application(s) Topical two times a day  pantoprazole   Suspension 40 milliGRAM(s) Oral daily  petrolatum Ophthalmic Ointment 1 Application(s) Both EYES three times a day  senna 2 Tablet(s) Oral at bedtime  zinc oxide 20% Ointment 1 Application(s) Topical three times a day    MEDICATIONS  (PRN):  acetaminophen    Suspension .. 1000 milliGRAM(s) Oral every 8 hours PRN Moderate Pain (4 - 6)  ALPRAZolam 0.5 milliGRAM(s) Oral every 8 hours PRN anxiety  dextrose 40% Gel 15 Gram(s) Oral once PRN Blood Glucose LESS THAN 70 milliGRAM(s)/deciliter  glucagon  Injectable 1 milliGRAM(s) IntraMuscular once PRN Glucose LESS THAN 70 milligrams/deciliter  sodium chloride 0.9% lock flush 10 milliLiter(s) IV Push every 1 hour PRN Pre/post blood products, medications, blood draw, and to maintain line patency      ALLERGIES:  Allergies    No Known Allergies    Intolerances    IVIG PRODUCT IS PRIGIVEN OR GAMMUNEX (Unknown)      LABS:                        7.8    9.19  )-----------( 271      ( 24 May 2019 05:46 )             24.9     05-23    139  |  100  |  19  ----------------------------<  164<H>  3.4<L>   |  28  |  0.48<L>    Ca    9.3      23 May 2019 06:22  Mg     2.0     05-23      PT/INR - ( 23 May 2019 06:22 )   PT: 13.4 sec;   INR: 1.18          PTT - ( 23 May 2019 06:22 )  PTT:30.9 sec  Urinalysis Basic - ( 23 May 2019 19:33 )    Color: Yellow / Appearance: Clear / S.020 / pH: x  Gluc: x / Ketone: NEGATIVE  / Bili: Negative / Urobili: 1.0 E.U./dL   Blood: x / Protein: 30 mg/dL / Nitrite: NEGATIVE   Leuk Esterase: Small / RBC: 5-10 /HPF / WBC Many /HPF   Sq Epi: x / Non Sq Epi: 0-5 /HPF / Bacteria: Present /HPF        RADIOLOGY & ADDITIONAL TESTS: Reviewed.

## 2019-05-25 DIAGNOSIS — J96.11 CHRONIC RESPIRATORY FAILURE WITH HYPOXIA: ICD-10-CM

## 2019-05-25 LAB
-  AMPICILLIN: SIGNIFICANT CHANGE UP
-  CIPROFLOXACIN: SIGNIFICANT CHANGE UP
-  DAPTOMYCIN: SIGNIFICANT CHANGE UP
-  LEVOFLOXACIN: SIGNIFICANT CHANGE UP
-  NITROFURANTOIN: SIGNIFICANT CHANGE UP
-  TETRACYCLINE: SIGNIFICANT CHANGE UP
-  VANCOMYCIN: SIGNIFICANT CHANGE UP
-  VANCOMYCIN: SIGNIFICANT CHANGE UP
ANION GAP SERPL CALC-SCNC: 8 MMOL/L — SIGNIFICANT CHANGE UP (ref 5–17)
BUN SERPL-MCNC: 18 MG/DL — SIGNIFICANT CHANGE UP (ref 7–23)
CALCIUM SERPL-MCNC: 9.3 MG/DL — SIGNIFICANT CHANGE UP (ref 8.4–10.5)
CHLORIDE SERPL-SCNC: 102 MMOL/L — SIGNIFICANT CHANGE UP (ref 96–108)
CO2 SERPL-SCNC: 29 MMOL/L — SIGNIFICANT CHANGE UP (ref 22–31)
CREAT SERPL-MCNC: 0.49 MG/DL — LOW (ref 0.5–1.3)
CULTURE RESULTS: SIGNIFICANT CHANGE UP
CULTURE RESULTS: SIGNIFICANT CHANGE UP
GLUCOSE SERPL-MCNC: 111 MG/DL — HIGH (ref 70–99)
HCT VFR BLD CALC: 26 % — LOW (ref 34.5–45)
HGB BLD-MCNC: 8 G/DL — LOW (ref 11.5–15.5)
MAGNESIUM SERPL-MCNC: 1.9 MG/DL — SIGNIFICANT CHANGE UP (ref 1.6–2.6)
MCHC RBC-ENTMCNC: 28.7 PG — SIGNIFICANT CHANGE UP (ref 27–34)
MCHC RBC-ENTMCNC: 30.8 GM/DL — LOW (ref 32–36)
MCV RBC AUTO: 93.2 FL — SIGNIFICANT CHANGE UP (ref 80–100)
METHOD TYPE: SIGNIFICANT CHANGE UP
NRBC # BLD: 0 /100 WBCS — SIGNIFICANT CHANGE UP (ref 0–0)
ORGANISM # SPEC MICROSCOPIC CNT: SIGNIFICANT CHANGE UP
PLATELET # BLD AUTO: 276 K/UL — SIGNIFICANT CHANGE UP (ref 150–400)
POTASSIUM SERPL-MCNC: 4.1 MMOL/L — SIGNIFICANT CHANGE UP (ref 3.5–5.3)
POTASSIUM SERPL-SCNC: 4.1 MMOL/L — SIGNIFICANT CHANGE UP (ref 3.5–5.3)
RBC # BLD: 2.79 M/UL — LOW (ref 3.8–5.2)
RBC # FLD: 15.9 % — HIGH (ref 10.3–14.5)
SODIUM SERPL-SCNC: 139 MMOL/L — SIGNIFICANT CHANGE UP (ref 135–145)
SPECIMEN SOURCE: SIGNIFICANT CHANGE UP
SPECIMEN SOURCE: SIGNIFICANT CHANGE UP
WBC # BLD: 7.32 K/UL — SIGNIFICANT CHANGE UP (ref 3.8–10.5)
WBC # FLD AUTO: 7.32 K/UL — SIGNIFICANT CHANGE UP (ref 3.8–10.5)

## 2019-05-25 PROCEDURE — 99231 SBSQ HOSP IP/OBS SF/LOW 25: CPT

## 2019-05-25 PROCEDURE — 99233 SBSQ HOSP IP/OBS HIGH 50: CPT | Mod: GC

## 2019-05-25 PROCEDURE — 99233 SBSQ HOSP IP/OBS HIGH 50: CPT

## 2019-05-25 RX ADMIN — MIDODRINE HYDROCHLORIDE 15 MILLIGRAM(S): 2.5 TABLET ORAL at 06:10

## 2019-05-25 RX ADMIN — LINEZOLID 600 MILLIGRAM(S): 600 INJECTION, SOLUTION INTRAVENOUS at 13:08

## 2019-05-25 RX ADMIN — MIDODRINE HYDROCHLORIDE 15 MILLIGRAM(S): 2.5 TABLET ORAL at 13:02

## 2019-05-25 RX ADMIN — FLUDROCORTISONE ACETATE 0.1 MILLIGRAM(S): 0.1 TABLET ORAL at 06:10

## 2019-05-25 RX ADMIN — ZINC OXIDE 1 APPLICATION(S): 200 OINTMENT TOPICAL at 06:11

## 2019-05-25 RX ADMIN — Medication 100 MILLIGRAM(S): at 12:57

## 2019-05-25 RX ADMIN — LINEZOLID 600 MILLIGRAM(S): 600 INJECTION, SOLUTION INTRAVENOUS at 02:00

## 2019-05-25 RX ADMIN — Medication 1000 MILLIGRAM(S): at 02:07

## 2019-05-25 RX ADMIN — CHLORHEXIDINE GLUCONATE 15 MILLILITER(S): 213 SOLUTION TOPICAL at 12:57

## 2019-05-25 RX ADMIN — NYSTATIN CREAM 1 APPLICATION(S): 100000 CREAM TOPICAL at 06:10

## 2019-05-25 RX ADMIN — Medication 500 MILLIGRAM(S): at 12:57

## 2019-05-25 RX ADMIN — NYSTATIN CREAM 1 APPLICATION(S): 100000 CREAM TOPICAL at 20:03

## 2019-05-25 RX ADMIN — ZINC OXIDE 1 APPLICATION(S): 200 OINTMENT TOPICAL at 13:01

## 2019-05-25 NOTE — PROGRESS NOTE ADULT - ASSESSMENT
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

## 2019-05-25 NOTE — PROGRESS NOTE ADULT - PROBLEM SELECTOR PLAN 5
- unable to wean from ventilator, may be 2/2 pneumonia and/or critical illness polyneuropathy  - tolerating CPAP trial today with 10/5 pressures support, RR 25-29, -220. Will continue daily trials and attempt to wean down pressure support in order to decannulate  - Will need LTAC placement.  - Pt is DNR

## 2019-05-25 NOTE — PROGRESS NOTE ADULT - SUBJECTIVE AND OBJECTIVE BOX
GYN PROGRESS NOTE    Patient evaluated at the bedside. No acute events.  Denies CP/SOB/dizziness/nausea/vomiting/abdominal pain/calf pain.  Pain well controlled. Joseph in place. Patient would like to sleep now.     O:   T(C): 37.1 (05-25-19 @ 09:09), Max: 37.5 (05-24-19 @ 14:00)  HR: 69 (05-25-19 @ 09:09) (64 - 79)  BP: 122/82 (05-25-19 @ 09:09) (84/54 - 128/59)  RR: 20 (05-25-19 @ 09:09) (14 - 27)  SpO2: 100% (05-25-19 @ 09:09) (75% - 100%)  Wt(kg): --    GEN: no acute distress, resting comfortably   LUNGS: no respiratory distress  ABD: soft, nontender, not distended  Pelvic: joseph in place draining yellow urine   EXT: no calf tenderness        05-24 @ 07:01  -  05-25 @ 07:00  --------------------------------------------------------  IN: 808 mL / OUT: 585 mL / NET: 223 mL

## 2019-05-25 NOTE — PROGRESS NOTE ADULT - SUBJECTIVE AND OBJECTIVE BOX
Patient seen and examined at bedside. Continues to have improved UE strength, able to work on her computer. Denies SOB, chest pain, fevers/chills        MEDICATIONS:  Pulmonary:    Antimicrobials:  linezolid    Suspension 600 milliGRAM(s) Enteral Tube every 12 hours    Anticoagulants:  enoxaparin Injectable 40 milliGRAM(s) SubCutaneous every 24 hours    Cardiac:  midodrine 15 milliGRAM(s) Oral every 8 hours      Allergies    No Known Allergies    Intolerances    IVIG PRODUCT IS PRIGIVEN OR GAMMUNEX (Unknown)      Vital Signs Last 24 Hrs  T(C): 37.1 (25 May 2019 09:09), Max: 37.5 (24 May 2019 14:00)  T(F): 98.7 (25 May 2019 09:09), Max: 99.5 (24 May 2019 14:00)  HR: 69 (25 May 2019 09:09) (64 - 79)  BP: 122/82 (25 May 2019 09:09) (84/54 - 128/59)  BP(mean): 85 (24 May 2019 15:00) (85 - 85)  RR: 20 (25 May 2019 09:09) (14 - 27)  SpO2: 100% (25 May 2019 09:09) (98% - 100%)    - @ 07:01  -   @ 07:00  --------------------------------------------------------  IN: 808 mL / OUT: 585 mL / NET: 223 mL      Mode: AC/ CMV (Assist Control/ Continuous Mandatory Ventilation)  RR (machine): 12  TV (machine): 320  FiO2: 40  PEEP: 5  ITime: 1  MAP: 11  PIP: 21      PHYSICAL EXAM:    General: Well developed; well nourished; in no acute distress on ventilator  Neck: Supple; non tender; no masses  Respiratory: Decreased BS at B/L bases  Cardiovascular: Regular rate and rhythm. S1 and S2 Normal; No murmurs, gallops or rubs  Gastrointestinal: Mild tenderness at PEG tube site, nondistended, no guarding  Extremities: + dependent edema B/L, no clubbing/cyanosis  Neurological: 4/5 strength B/L UE, 0/5 LE B/L   Skin: Warm and dry. No obvious rash    LABS:      CBC Full  -  ( 25 May 2019 06:40 )  WBC Count : 7.32 K/uL  RBC Count : 2.79 M/uL  Hemoglobin : 8.0 g/dL  Hematocrit : 26.0 %  Platelet Count - Automated : 276 K/uL  Mean Cell Volume : 93.2 fl  Mean Cell Hemoglobin : 28.7 pg  Mean Cell Hemoglobin Concentration : 30.8 gm/dL        139  |  102  |  18  ----------------------------<  111<H>  4.1   |  29  |  0.49<L>    Ca    9.3      25 May 2019 06:40  Phos  4.2       Mg     1.9                 Urinalysis Basic - ( 23 May 2019 19:33 )    Color: Yellow / Appearance: Clear / S.020 / pH: x  Gluc: x / Ketone: NEGATIVE  / Bili: Negative / Urobili: 1.0 E.U./dL   Blood: x / Protein: 30 mg/dL / Nitrite: NEGATIVE   Leuk Esterase: Small / RBC: 5-10 /HPF / WBC Many /HPF   Sq Epi: x / Non Sq Epi: 0-5 /HPF / Bacteria: Present /HPF                RADIOLOGY & ADDITIONAL STUDIES (The following images were personally reviewed):

## 2019-05-25 NOTE — PROGRESS NOTE ADULT - PROBLEM SELECTOR PLAN 1
- Neurologically improving with UE strength 4/5, s/p IVIG and then plasmapheresis   - Appreciate neurology recs

## 2019-05-25 NOTE — PROGRESS NOTE ADULT - PROBLEM SELECTOR PLAN 2
- MRSA in sputum, may be contributing to inability to wean from ventilator, will complete 7 days of Linezolid

## 2019-05-25 NOTE — PROGRESS NOTE ADULT - ASSESSMENT
60yo F HD27 with stage IV endometrial adenocarcinoma s/p staging surgery '18 and 1 round of chemotherapy 2/19 readmitted from [facility] with fever, previously admitted for management of symptomatic rectovaginal and enterovaginal fistulas. Presents on referral from [facility] with urosepsis. Rapid response called due to worsening respiratory status s/p intubation and MICU admission. She then was on regional GYN service however had respiratory weakness and was transferred to Medicine/tele HD9 until requiring intubation again HD10 and is now in MICU. 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 She was re-intubated on 5/13  and received plasmapheresis x5. Pt now has ET tube and PEG placed 5/21. She was made DNR/DNI 5/14 and on 5/17 found to have upper extremity DVT. Vitals stable this AM. Primary management per MICU.    1. Neuro: Weakness continues to improve- now status post IVIG x5 day course, s/p IVIG tx. 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 Pulm: Trach tube (5/21-,  DNR/DNI   3. Cardio: On Florinef     4. FEN/GI: PEG 5/21- Repletion of electrolytes prn   5. : [place] for overflow incontinence. Recurrent UTIs. Suprapubic catheter possibly in the future. F/u urology recs     6. ID: Afebrile currently. Switched to Linezolid 600mg BID (5/24-). Last urine culture 5/22 reveals VRE and Sputum culture 5/22 and Bronchial wash 5/21 reveals MRSA    s/p Vancomycin 1g (5/22-5/24), s/p Meropenem 5/22, s/p ertapenem ended 5/14. F/u ID recs   7. Endocrine: FS, ISS, Lantus 8u qhs (started 5/14), Humulin 4u q6hr, Metformin 1000mg BID held at this time   8. VTE prophylaxis - SCDs, Upper extremity superficial vein thrombosis noted 5/17, repeat doppler done 5/23-shows stable superficial thrombosis -Lovenox 60mg daily LE Dopplers negative   9. GYN malignancy  - s/p Anastrazole, s/p Megace 80mg BID x3 wks , s/p Tamoxifen 5/1-5/17 (stopped due to upper extremity DVT). Once patient recovers from acute neurologic issue will reassess cancer treatment options   -  Avoid immunotherapy at this time given associated risk of autoimmune disease   10. Derm: monitor sacrum for s/s of pressure ulcer, now stage 2- continue with zinc oxide   11. Follow up PT/OT recs   12. Social work/palliative: DNR/DNI. Interdisciplinary meeting was done on 5/23

## 2019-05-25 NOTE — PROGRESS NOTE ADULT - SUBJECTIVE AND OBJECTIVE BOX
INTERVAL HISTORY:  She feel stronger today. No complaints. Transferred to floor. On linezolid for VRE, midodrine and florinef for hypotension.     REVIEW OF SYSTEMS:  As per HPI, otherwise negative for Constitutional, Eyes, Ears/Nose/Mouth/Throat, Neck, Cardiovascular, Respiratory, Gastrointestinal, Genitourinary, Skin, Endocrine, Musculoskeletal, Psychiatric, and Hematologic/Lymphatic.    VITAL SIGNS:  Vital Signs Last 24 Hrs  T(C): 37.1 (25 May 2019 09:09), Max: 37.5 (24 May 2019 14:00)  T(F): 98.7 (25 May 2019 09:09), Max: 99.5 (24 May 2019 14:00)  HR: 69 (25 May 2019 09:09) (64 - 79)  BP: 122/82 (25 May 2019 09:09) (84/54 - 128/59)  BP(mean): 85 (24 May 2019 15:00) (78 - 85)  RR: 20 (25 May 2019 09:09) (14 - 27)  SpO2: 100% (25 May 2019 09:09) (98% - 100%)    PHYSICAL EXAMINATION:  MS: awake, alert, follows commands, writes on notepad  CN: normal  Motor: deltoids 4, biceps/triceps 4+, finger ext 4; hip flex 2 R, 3 L, feet 0/5 b/l    LABS:    05-25    139  |  102  |  18  ----------------------------<  111<H>  4.1   |  29  |  0.49<L>    Ca    9.3      25 May 2019 06:40  Phos  4.2     05-24  Mg     1.9     05-25                        8.0    7.32  )-----------( 276      ( 25 May 2019 06:40 )             26.0

## 2019-05-25 NOTE — PROGRESS NOTE ADULT - ASSESSMENT
60 yo F with PMH polio, endometrial ca stage IV, presented originally with progressive weakness thought to be 2/2 CIDP vs AIDP vs paraneoplastic syndrome failed IVIG, now s/p plasmapheresis, tracheostomy for chronic respiratory failure, hospital course complicated by MRSA pna and VRE UTI

## 2019-05-26 LAB
ANION GAP SERPL CALC-SCNC: 9 MMOL/L — SIGNIFICANT CHANGE UP (ref 5–17)
BASOPHILS # BLD AUTO: 0.02 K/UL — SIGNIFICANT CHANGE UP (ref 0–0.2)
BASOPHILS NFR BLD AUTO: 0.3 % — SIGNIFICANT CHANGE UP (ref 0–2)
BLD GP AB SCN SERPL QL: NEGATIVE — SIGNIFICANT CHANGE UP
BUN SERPL-MCNC: 17 MG/DL — SIGNIFICANT CHANGE UP (ref 7–23)
CALCIUM SERPL-MCNC: 9.6 MG/DL — SIGNIFICANT CHANGE UP (ref 8.4–10.5)
CHLORIDE SERPL-SCNC: 101 MMOL/L — SIGNIFICANT CHANGE UP (ref 96–108)
CO2 SERPL-SCNC: 29 MMOL/L — SIGNIFICANT CHANGE UP (ref 22–31)
CREAT SERPL-MCNC: 0.39 MG/DL — LOW (ref 0.5–1.3)
EOSINOPHIL # BLD AUTO: 0.15 K/UL — SIGNIFICANT CHANGE UP (ref 0–0.5)
EOSINOPHIL NFR BLD AUTO: 2.2 % — SIGNIFICANT CHANGE UP (ref 0–6)
GLUCOSE SERPL-MCNC: 123 MG/DL — HIGH (ref 70–99)
HCT VFR BLD CALC: 27.4 % — LOW (ref 34.5–45)
HGB BLD-MCNC: 8.4 G/DL — LOW (ref 11.5–15.5)
IMM GRANULOCYTES NFR BLD AUTO: 0.1 % — SIGNIFICANT CHANGE UP (ref 0–1.5)
LYMPHOCYTES # BLD AUTO: 2.22 K/UL — SIGNIFICANT CHANGE UP (ref 1–3.3)
LYMPHOCYTES # BLD AUTO: 33.2 % — SIGNIFICANT CHANGE UP (ref 13–44)
MAGNESIUM SERPL-MCNC: 1.9 MG/DL — SIGNIFICANT CHANGE UP (ref 1.6–2.6)
MCHC RBC-ENTMCNC: 28.5 PG — SIGNIFICANT CHANGE UP (ref 27–34)
MCHC RBC-ENTMCNC: 30.7 GM/DL — LOW (ref 32–36)
MCV RBC AUTO: 92.9 FL — SIGNIFICANT CHANGE UP (ref 80–100)
MONOCYTES # BLD AUTO: 0.66 K/UL — SIGNIFICANT CHANGE UP (ref 0–0.9)
MONOCYTES NFR BLD AUTO: 9.9 % — SIGNIFICANT CHANGE UP (ref 2–14)
NEUTROPHILS # BLD AUTO: 3.63 K/UL — SIGNIFICANT CHANGE UP (ref 1.8–7.4)
NEUTROPHILS NFR BLD AUTO: 54.3 % — SIGNIFICANT CHANGE UP (ref 43–77)
NRBC # BLD: 0 /100 WBCS — SIGNIFICANT CHANGE UP (ref 0–0)
PLATELET # BLD AUTO: 307 K/UL — SIGNIFICANT CHANGE UP (ref 150–400)
POTASSIUM SERPL-MCNC: 3.8 MMOL/L — SIGNIFICANT CHANGE UP (ref 3.5–5.3)
POTASSIUM SERPL-SCNC: 3.8 MMOL/L — SIGNIFICANT CHANGE UP (ref 3.5–5.3)
RBC # BLD: 2.95 M/UL — LOW (ref 3.8–5.2)
RBC # FLD: 15.9 % — HIGH (ref 10.3–14.5)
RH IG SCN BLD-IMP: POSITIVE — SIGNIFICANT CHANGE UP
SODIUM SERPL-SCNC: 139 MMOL/L — SIGNIFICANT CHANGE UP (ref 135–145)
WBC # BLD: 6.69 K/UL — SIGNIFICANT CHANGE UP (ref 3.8–10.5)
WBC # FLD AUTO: 6.69 K/UL — SIGNIFICANT CHANGE UP (ref 3.8–10.5)

## 2019-05-26 PROCEDURE — 99232 SBSQ HOSP IP/OBS MODERATE 35: CPT

## 2019-05-26 PROCEDURE — 99231 SBSQ HOSP IP/OBS SF/LOW 25: CPT

## 2019-05-26 PROCEDURE — 99233 SBSQ HOSP IP/OBS HIGH 50: CPT | Mod: GC

## 2019-05-26 RX ORDER — MAGNESIUM SULFATE 500 MG/ML
1 VIAL (ML) INJECTION ONCE
Refills: 0 | Status: COMPLETED | OUTPATIENT
Start: 2019-05-26 | End: 2019-05-26

## 2019-05-26 RX ORDER — LIDOCAINE 4 G/100G
1 CREAM TOPICAL DAILY
Refills: 0 | Status: COMPLETED | OUTPATIENT
Start: 2019-05-26 | End: 2019-05-29

## 2019-05-26 RX ORDER — MIDODRINE HYDROCHLORIDE 2.5 MG/1
10 TABLET ORAL EVERY 8 HOURS
Refills: 0 | Status: DISCONTINUED | OUTPATIENT
Start: 2019-05-26 | End: 2019-06-16

## 2019-05-26 RX ORDER — POTASSIUM CHLORIDE 20 MEQ
20 PACKET (EA) ORAL ONCE
Refills: 0 | Status: COMPLETED | OUTPATIENT
Start: 2019-05-26 | End: 2019-05-26

## 2019-05-26 RX ADMIN — INSULIN HUMAN 2: 100 INJECTION, SOLUTION SUBCUTANEOUS at 12:41

## 2019-05-26 RX ADMIN — LINEZOLID 600 MILLIGRAM(S): 600 INJECTION, SOLUTION INTRAVENOUS at 13:32

## 2019-05-26 RX ADMIN — CHLORHEXIDINE GLUCONATE 15 MILLILITER(S): 213 SOLUTION TOPICAL at 21:44

## 2019-05-26 RX ADMIN — SENNA PLUS 2 TABLET(S): 8.6 TABLET ORAL at 21:45

## 2019-05-26 RX ADMIN — Medication 1000 MILLIGRAM(S): at 02:30

## 2019-05-26 RX ADMIN — SENNA PLUS 2 TABLET(S): 8.6 TABLET ORAL at 00:22

## 2019-05-26 RX ADMIN — Medication 500 MILLIGRAM(S): at 11:41

## 2019-05-26 RX ADMIN — NYSTATIN CREAM 1 APPLICATION(S): 100000 CREAM TOPICAL at 07:03

## 2019-05-26 RX ADMIN — INSULIN GLARGINE 8 UNIT(S): 100 INJECTION, SOLUTION SUBCUTANEOUS at 00:22

## 2019-05-26 RX ADMIN — ZINC OXIDE 1 APPLICATION(S): 200 OINTMENT TOPICAL at 13:32

## 2019-05-26 RX ADMIN — MIDODRINE HYDROCHLORIDE 15 MILLIGRAM(S): 2.5 TABLET ORAL at 07:04

## 2019-05-26 RX ADMIN — Medication 20 MILLIEQUIVALENT(S): at 10:38

## 2019-05-26 RX ADMIN — CHLORHEXIDINE GLUCONATE 15 MILLILITER(S): 213 SOLUTION TOPICAL at 10:38

## 2019-05-26 RX ADMIN — Medication 100 GRAM(S): at 10:38

## 2019-05-26 RX ADMIN — NYSTATIN CREAM 1 APPLICATION(S): 100000 CREAM TOPICAL at 17:56

## 2019-05-26 RX ADMIN — LIDOCAINE 1 APPLICATION(S): 4 CREAM TOPICAL at 17:57

## 2019-05-26 RX ADMIN — LINEZOLID 600 MILLIGRAM(S): 600 INJECTION, SOLUTION INTRAVENOUS at 00:22

## 2019-05-26 RX ADMIN — Medication 100 MILLIGRAM(S): at 11:41

## 2019-05-26 RX ADMIN — ZINC OXIDE 1 APPLICATION(S): 200 OINTMENT TOPICAL at 07:03

## 2019-05-26 RX ADMIN — CHLORHEXIDINE GLUCONATE 15 MILLILITER(S): 213 SOLUTION TOPICAL at 00:23

## 2019-05-26 RX ADMIN — ZINC OXIDE 1 APPLICATION(S): 200 OINTMENT TOPICAL at 21:48

## 2019-05-26 RX ADMIN — Medication 1000 MILLIGRAM(S): at 01:26

## 2019-05-26 RX ADMIN — ZINC OXIDE 1 APPLICATION(S): 200 OINTMENT TOPICAL at 01:26

## 2019-05-26 RX ADMIN — ENOXAPARIN SODIUM 40 MILLIGRAM(S): 100 INJECTION SUBCUTANEOUS at 21:44

## 2019-05-26 RX ADMIN — FLUDROCORTISONE ACETATE 0.1 MILLIGRAM(S): 0.1 TABLET ORAL at 07:04

## 2019-05-26 RX ADMIN — ENOXAPARIN SODIUM 40 MILLIGRAM(S): 100 INJECTION SUBCUTANEOUS at 01:25

## 2019-05-26 RX ADMIN — MIDODRINE HYDROCHLORIDE 10 MILLIGRAM(S): 2.5 TABLET ORAL at 13:32

## 2019-05-26 RX ADMIN — MIDODRINE HYDROCHLORIDE 15 MILLIGRAM(S): 2.5 TABLET ORAL at 00:22

## 2019-05-26 RX ADMIN — MIDODRINE HYDROCHLORIDE 10 MILLIGRAM(S): 2.5 TABLET ORAL at 21:45

## 2019-05-26 NOTE — PROGRESS NOTE ADULT - SUBJECTIVE AND OBJECTIVE BOX
INTERVAL HISTORY:  Continues to feel stronger. Able to tolerate CPAP for several hours yesterday, currently on CPAP with 5/10 pressure support.    REVIEW OF SYSTEMS:  As per HPI, otherwise negative for Constitutional, Eyes, Ears/Nose/Mouth/Throat, Neck, Cardiovascular, Respiratory, Gastrointestinal, Genitourinary, Skin, Endocrine, Musculoskeletal, Psychiatric, and Hematologic/Lymphatic.    VITAL SIGNS:  Vital Signs Last 24 Hrs  T(C): 37.1 (25 May 2019 09:09), Max: 37.5 (24 May 2019 14:00)  T(F): 98.7 (25 May 2019 09:09), Max: 99.5 (24 May 2019 14:00)  HR: 69 (25 May 2019 09:09) (64 - 79)  BP: 122/82 (25 May 2019 09:09) (84/54 - 128/59)  BP(mean): 85 (24 May 2019 15:00) (78 - 85)  RR: 20 (25 May 2019 09:09) (14 - 27)  SpO2: 100% (25 May 2019 09:09) (98% - 100%)    PHYSICAL EXAMINATION:  MS: awake, alert, follows commands, writes on notepad  CN: normal  Motor: deltoids 4, biceps/triceps 4+, finger ext 4; hip flex 2 R, 3 L, feet 0/5 b/l    LABS:    05-26    139  |  101  |  17  ----------------------------<  123<H>  3.8   |  29  |  0.39<L>    Ca    9.6      26 May 2019 07:30  Mg     1.9     05-26                          8.4    6.69  )-----------( 307      ( 26 May 2019 07:30 )             27.4

## 2019-05-26 NOTE — PROGRESS NOTE ADULT - PROBLEM SELECTOR PLAN 2
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 Initial concern for adrenal insufficiency, stim test negative.  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 Initial concern for adrenal insufficiency, stim test negative.  - Continue florinef 0.1mg qd   - C/w midodrine 10mg TID

## 2019-05-26 NOTE — PROGRESS NOTE ADULT - ASSESSMENT
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

## 2019-05-26 NOTE — PROGRESS NOTE ADULT - ASSESSMENT
58 yo F PMHx polio, breast cancer, DM, Endometrial Cancer s/p exploratory laparotomy, enterolysis, SHAMIR, BSO, pelvic lymphadenectomy, low anterior resection mobilization of splenic flexure, end colostomy on 7/30/18, rectovaginal fistula, numerous admissions for urinary tract infection presented to [facility] in the setting of urosepsis. Hospital course complicated by ESBL E coli bacteremia ( completed ertapenem on 5/14 ) and respiratory failure requiring multiple intubations 2/2 AIDP, now s/p trach and PEG ( 5/21), being treated for sepsis 2/2 MRSA PNA w/ linezolid

## 2019-05-26 NOTE — PROGRESS NOTE ADULT - ATTENDING COMMENTS
Pt seen and examined, agree with above. AIDP and LEN contributing to chronic respiratory failure, but improving. Tolerated CPAP trial for 5 hrs yesterday, 10/5. Will continue with CPAP trial today, if able to tolerate with decrease to 8/5 tomorrow and continue to wean down pressure support for ultimate goal of trach collar trial. To complete linezolid 5/30 for VRE in urine and MRSA in sputum, no evidence of recurrent sepsis. BP significantly improved, will decrease midodrine to 10mg TID and continue to trend BP.

## 2019-05-26 NOTE — PROGRESS NOTE ADULT - SUBJECTIVE AND OBJECTIVE BOX
GYN PROGRESS NOTE    Patient evaluated at the bedside. No acute events. Patient reports throat discomfort but otherwise has no complaints. She reports feeling tired as she did not sleep well last night.   Denies CP/SOB/dizziness/nausea/vomiting/abdominal pain/calf pain.    O:   T(C): 37.1 (05-26-19 @ 17:17), Max: 37.4 (05-25-19 @ 23:41)  HR: 78 (05-26-19 @ 17:32) (62 - 78)  BP: 128/83 (05-26-19 @ 17:17) (95/62 - 157/83)  RR: 22 (05-26-19 @ 17:32) (12 - 22)  SpO2: 99% (05-26-19 @ 17:32) (97% - 100%)  Wt(kg): --    GEN: patient appears to be resting comfortably, sleeping   LUNGS: no respiratory distress  ABD: soft, nontender   Pelvic: joseph draining clear urine   EXT: no calf tenderness, SCDs in place         05-25 @ 07:01  -  05-26 @ 07:00  --------------------------------------------------------  IN: 0 mL / OUT: 1400 mL / NET: -1400 mL    05-26 @ 07:01  -  05-26 @ 18:56  --------------------------------------------------------  IN: 0 mL / OUT: 200 mL / NET: -200 mL

## 2019-05-26 NOTE — PROGRESS NOTE ADULT - PROBLEM SELECTOR PLAN 4
Pt spiked to 101.6  ( 5/22). UA  positive, UCx growing Enterococcus and Bronch wash growing MRSA; Was on vancomycin ( 5/22- 5/24) d/c due to Cx positive for VRE.  - Continue linezolid for a total of 7 days (5/24 - 5/30)   - BCx 5/22 NGTD Pt spiked to 101.6  on 5/22. UA  positive, UCx growing Enterococcus and Bronch wash growing MRSA; Was on vancomycin ( 5/22- 5/24) d/c due to Cx positive for VRE.  - Continue linezolid for a total of 7 days (5/24 - 5/30)   - BCx 5/22 NGTD

## 2019-05-26 NOTE — PROGRESS NOTE ADULT - ASSESSMENT
58yo F HD27 with stage IV endometrial adenocarcinoma s/p staging surgery '18 and 1 round of chemotherapy 2/19 readmitted from [facility] with fever, previously admitted for management of symptomatic rectovaginal and enterovaginal fistulas. Presents on referral from [facility] with urosepsis. Rapid response called due to worsening respiratory status s/p intubation and MICU admission. She then was on regional GYN service however had respiratory weakness and was transferred to Medicine/tele HD9 until requiring intubation again HD10 and is now in MICU. 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 She was re-intubated on 5/13  and received plasmapheresis x5. Pt now has ET tube and PEG placed 5/21. She was made DNR/DNI 5/14 and on 5/17 found to have upper extremity DVT. Vitals stable this AM. Primary management per internal medicine.    1. Neuro: Weakness continues to improve- now status post IVIG x5 day course, s/p IVIG tx. 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 Pulm: Trach tube (5/21-,  DNR/DNI   3. Cardio: On Florinef, midodrine     4. FEN/GI: PEG 5/21- Repletion of electrolytes prn   5. : Westbrook for overflow incontinence. Recurrent UTIs. Suprapubic catheter possibly in the future. F/u urology recs     6. ID: Afebrile currently. Switched to Linezolid 600mg BID (5/24-). Last urine culture 5/22 reveals VRE and Sputum culture 5/22 and Bronchial wash 5/21 reveals MRSA    s/p Vancomycin 1g (5/22-5/24), s/p Meropenem 5/22, s/p ertapenem ended 5/14. F/u ID recs   7. Endocrine: FS, ISS, Lantus 8u qhs (started 5/14), Humulin 4u q6hr, Metformin 1000mg BID held at this time   8. VTE prophylaxis - SCDs, Upper extremity superficial vein thrombosis noted 5/17, repeat doppler done 5/23-shows stable superficial thrombosis -Lovenox 60mg daily LE Dopplers negative   9. GYN malignancy  - s/p Anastrazole, s/p Megace 80mg BID x3 wks , s/p Tamoxifen 5/1-5/17 (stopped due to upper extremity DVT). Once patient recovers from acute neurologic issue will reassess cancer treatment options   -  Avoid immunotherapy at this time given associated risk of autoimmune disease   10. Derm: monitor sacrum for s/s of pressure ulcer, now stage 2- continue with zinc oxide   11. Follow up PT/OT recs   12. Social work/palliative: DNR/DNI. Interdisciplinary meeting was done on 5/23

## 2019-05-26 NOTE — PROGRESS NOTE ADULT - SUBJECTIVE AND OBJECTIVE BOX
GYN Progress Note    Patient seen at bedside.  Pain controlled.   Tolerating PEG feeds.   Sitting up in bed on her cell phone.   Westbrook in place. Says that she wants to go home.    Denies CP, palpitations, SOB, fever, chills, nausea, vomiting.    Vital Signs Last 24 Hrs  T(C): 37.4 (25 May 2019 23:41), Max: 37.4 (25 May 2019 23:41)  T(F): 99.3 (25 May 2019 23:41), Max: 99.3 (25 May 2019 23:41)  HR: 71 (26 May 2019 05:40) (64 - 78)  BP: 95/62 (25 May 2019 23:41) (95/62 - 122/82)  BP(mean): --  RR: 18 (25 May 2019 23:41) (18 - 20)  SpO2: 98% (26 May 2019 05:40) (98% - 100%)    Physical Exam:  Gen: No Acute Distress  Pulm: Normal work of breathing with trach vent on AC/VC   GI: soft, nontender, nondistended, +BS, no rebound, no guarding. ostomy with good output of brown stool  Ext: SCDs in place, [facility]    I&O's Summary    24 May 2019 07:01  -  25 May 2019 07:00  --------------------------------------------------------  IN: 808 mL / OUT: 585 mL / NET: 223 mL    25 May 2019 07:01  -  26 May 2019 06:08  --------------------------------------------------------  IN: 0 mL / OUT: 1000 mL / NET: -1000 mL      MEDICATIONS  (STANDING):  ascorbic acid 500 milliGRAM(s) Oral daily  chlorhexidine 0.12% Liquid 15 milliLiter(s) Oral Mucosa every 12 hours  dextrose 5%. 1000 milliLiter(s) (50 mL/Hr) IV Continuous <Continuous>  dextrose 50% Injectable 25 Gram(s) IV Push once  docusate sodium Liquid 100 milliGRAM(s) Oral daily  enoxaparin Injectable 40 milliGRAM(s) SubCutaneous every 24 hours  fludroCORTISONE 0.1 milliGRAM(s) Oral every 24 hours  Heparin 1000 Units / ml 2 milliLiter(s) 2 milliLiter(s) IV Push once  insulin glargine Injectable (LANTUS) 8 Unit(s) SubCutaneous at bedtime  insulin regular  human corrective regimen sliding scale   SubCutaneous every 6 hours  linezolid    Suspension 600 milliGRAM(s) Enteral Tube every 12 hours  midodrine 15 milliGRAM(s) Oral every 8 hours  nystatin Cream 1 Application(s) Topical two times a day  senna 2 Tablet(s) Oral at bedtime  zinc oxide 20% Ointment 1 Application(s) Topical three times a day    MEDICATIONS  (PRN):  acetaminophen    Suspension .. 1000 milliGRAM(s) Oral every 8 hours PRN Moderate Pain (4 - 6)  dextrose 40% Gel 15 Gram(s) Oral once PRN Blood Glucose LESS THAN 70 milliGRAM(s)/deciliter  glucagon  Injectable 1 milliGRAM(s) IntraMuscular once PRN Glucose LESS THAN 70 milligrams/deciliter  LORazepam     Tablet 0.5 milliGRAM(s) Oral every 8 hours PRN Anxiety  sodium chloride 0.9% lock flush 10 milliLiter(s) IV Push every 1 hour PRN Pre/post blood products, medications, blood draw, and to maintain line patency      LABS:                        8.0    7.32  )-----------( 276      ( 25 May 2019 06:40 )             26.0     05-25    139  |  102  |  18  ----------------------------<  111<H>  4.1   |  29  |  0.49<L>    Ca    9.3      25 May 2019 06:40  Mg     1.9     05-25

## 2019-05-26 NOTE — PROGRESS NOTE ADULT - SUBJECTIVE AND OBJECTIVE BOX
INTERVAL HPI/OVERNIGHT EVENTS:  Patient was seen and examined at bedside. As per nurse and patient, no o/n events, patient resting comfortably. No complaints at this time. Patient denies: fever, chills, dizziness, weakness, HA, Changes in vision, CP, palpitations, SOB, cough, N/V/D/C, dysuria, changes in bowel movements, LE edema. ROS otherwise negative.    VITAL SIGNS:  T(F): 99.3 (05-25-19 @ 23:41)  HR: 71 (05-26-19 @ 05:40)  BP: 95/62 (05-25-19 @ 23:41)  RR: 18 (05-25-19 @ 23:41)  SpO2: 98% (05-26-19 @ 05:40)  Wt(kg): --    PHYSICAL EXAM:  Constitutional: WDWN, NAD, trach/peg, resting comfortably  HEENT: PERRL, EOMI, no JVD, MMM  Respiratory: B/l rhonchi w/ expiratory wheezes  Cardiovascular: RRR, normal S1S2, no M/R/G  Gastrointestinal: soft, NTND, no masses palpable, BS normal x4, PEG in place C/D/I  Extremities: Warm, well perfused, 2+ radial and DP pulses b/l, no edema, no clubbing  Neurological: Awake alert oriented, unable to verbalize but follows commands, nods yes/no w/ good mentation, 2-3/5 strength b/l LE, 4/5 strength b/l UE, unchanged from previous  Skin: Normal temperature, warm, dry    MEDICATIONS  (STANDING):  ascorbic acid 500 milliGRAM(s) Oral daily  chlorhexidine 0.12% Liquid 15 milliLiter(s) Oral Mucosa every 12 hours  dextrose 5%. 1000 milliLiter(s) (50 mL/Hr) IV Continuous <Continuous>  dextrose 50% Injectable 25 Gram(s) IV Push once  docusate sodium Liquid 100 milliGRAM(s) Oral daily  enoxaparin Injectable 40 milliGRAM(s) SubCutaneous every 24 hours  fludroCORTISONE 0.1 milliGRAM(s) Oral every 24 hours  Heparin 1000 Units / ml 2 milliLiter(s) 2 milliLiter(s) IV Push once  insulin glargine Injectable (LANTUS) 8 Unit(s) SubCutaneous at bedtime  insulin regular  human corrective regimen sliding scale   SubCutaneous every 6 hours  linezolid    Suspension 600 milliGRAM(s) Enteral Tube every 12 hours  midodrine 15 milliGRAM(s) Oral every 8 hours  nystatin Cream 1 Application(s) Topical two times a day  senna 2 Tablet(s) Oral at bedtime  zinc oxide 20% Ointment 1 Application(s) Topical three times a day    MEDICATIONS  (PRN):  acetaminophen    Suspension .. 1000 milliGRAM(s) Oral every 8 hours PRN Moderate Pain (4 - 6)  dextrose 40% Gel 15 Gram(s) Oral once PRN Blood Glucose LESS THAN 70 milliGRAM(s)/deciliter  glucagon  Injectable 1 milliGRAM(s) IntraMuscular once PRN Glucose LESS THAN 70 milligrams/deciliter  LORazepam     Tablet 0.5 milliGRAM(s) Oral every 8 hours PRN Anxiety  sodium chloride 0.9% lock flush 10 milliLiter(s) IV Push every 1 hour PRN Pre/post blood products, medications, blood draw, and to maintain line patency      Allergies    No Known Allergies    Intolerances    IVIG PRODUCT IS PRIGIVEN OR GAMMUNEX (Unknown)      LABS:                        8.0    7.32  )-----------( 276      ( 25 May 2019 06:40 )             26.0     05-25    139  |  102  |  18  ----------------------------<  111<H>  4.1   |  29  |  0.49<L>    Ca    9.3      25 May 2019 06:40  Mg     1.9     05-25            RADIOLOGY & ADDITIONAL TESTS:  Reviewed INTERVAL HPI/OVERNIGHT EVENTS:  Patient was seen and examined at bedside. No new active complaints at bedside. ON CPAP trial this AM, tidal volumes 240-250s, RR 24, pressure support 10 and PEEP 5. Patient communicates with notebook and letter pad at bedside. Appears well in good spirits, improving.    VITAL SIGNS:  T(F): 99.3 (05-25-19 @ 23:41)  HR: 71 (05-26-19 @ 05:40)  BP: 95/62 (05-25-19 @ 23:41)  RR: 18 (05-25-19 @ 23:41)  SpO2: 98% (05-26-19 @ 05:40)  Wt(kg): --    PHYSICAL EXAM:  Constitutional: WDWN, NAD, trach/peg, resting comfortably  HEENT: PERRL, EOMI, no JVD, MMM  Respiratory: B/l rhonchi w/ expiratory wheezes  Cardiovascular: RRR, normal S1S2, no M/R/G  Gastrointestinal: soft, NTND, no masses palpable, BS normal x4, PEG in place C/D/I  Extremities: Warm, well perfused, 2+ radial and DP pulses b/l, no edema, no clubbing  Neurological: Awake alert oriented, unable to verbalize but follows commands, nods yes/no w/ good mentation, 2-3/5 strength b/l LE, 4/5 strength b/l UE, unchanged from previous  Skin: Normal temperature, warm, dry    MEDICATIONS  (STANDING):  ascorbic acid 500 milliGRAM(s) Oral daily  chlorhexidine 0.12% Liquid 15 milliLiter(s) Oral Mucosa every 12 hours  dextrose 5%. 1000 milliLiter(s) (50 mL/Hr) IV Continuous <Continuous>  dextrose 50% Injectable 25 Gram(s) IV Push once  docusate sodium Liquid 100 milliGRAM(s) Oral daily  enoxaparin Injectable 40 milliGRAM(s) SubCutaneous every 24 hours  fludroCORTISONE 0.1 milliGRAM(s) Oral every 24 hours  Heparin 1000 Units / ml 2 milliLiter(s) 2 milliLiter(s) IV Push once  insulin glargine Injectable (LANTUS) 8 Unit(s) SubCutaneous at bedtime  insulin regular  human corrective regimen sliding scale   SubCutaneous every 6 hours  linezolid    Suspension 600 milliGRAM(s) Enteral Tube every 12 hours  midodrine 15 milliGRAM(s) Oral every 8 hours  nystatin Cream 1 Application(s) Topical two times a day  senna 2 Tablet(s) Oral at bedtime  zinc oxide 20% Ointment 1 Application(s) Topical three times a day    MEDICATIONS  (PRN):  acetaminophen    Suspension .. 1000 milliGRAM(s) Oral every 8 hours PRN Moderate Pain (4 - 6)  dextrose 40% Gel 15 Gram(s) Oral once PRN Blood Glucose LESS THAN 70 milliGRAM(s)/deciliter  glucagon  Injectable 1 milliGRAM(s) IntraMuscular once PRN Glucose LESS THAN 70 milligrams/deciliter  LORazepam     Tablet 0.5 milliGRAM(s) Oral every 8 hours PRN Anxiety  sodium chloride 0.9% lock flush 10 milliLiter(s) IV Push every 1 hour PRN Pre/post blood products, medications, blood draw, and to maintain line patency      Allergies    No Known Allergies    Intolerances    IVIG PRODUCT IS PRIGIVEN OR GAMMUNEX (Unknown)      LABS:                        8.0    7.32  )-----------( 276      ( 25 May 2019 06:40 )             26.0     05-25    139  |  102  |  18  ----------------------------<  111<H>  4.1   |  29  |  0.49<L>    Ca    9.3      25 May 2019 06:40  Mg     1.9     05-25            RADIOLOGY & ADDITIONAL TESTS:  Reviewed

## 2019-05-26 NOTE — PROGRESS NOTE ADULT - ASSESSMENT
58yo F HD27 with stage IV endometrial adenocarcinoma s/p staging surgery '18 and 1 round of chemotherapy 2/19 readmitted from [facility] with fever, previously admitted for management of symptomatic rectovaginal and enterovaginal fistulas. Presents on referral from [facility] with urosepsis. Rapid response called due to worsening respiratory status s/p intubation and MICU admission. She then was on regional GYN service however had respiratory weakness and was transferred to Medicine/tele HD9 until requiring intubation again HD10 and is now in MICU. 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 She was re-intubated on 5/13  and received plasmapheresis x5. Pt now has ET tube and PEG placed 5/21. She was made DNR/DNI 5/14 and on 5/17 found to have upper extremity DVT. Vitals stable this AM. Primary management per internal medicine.    1. Neuro: Weakness continues to improve- now status post IVIG x5 day course, s/p IVIG tx. 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 Pulm: Trach tube (5/21-,  DNR/DNI   3. Cardio: On Florinef, midodrine     4. FEN/GI: PEG 5/21- Repletion of electrolytes prn   5. : [place]brook for overflow incontinence. Recurrent UTIs. Suprapubic catheter possibly in the future. F/u urology recs     6. ID: Afebrile currently. Switched to Linezolid 600mg BID (5/24-). Last urine culture 5/22 reveals VRE and Sputum culture 5/22 and Bronchial wash 5/21 reveals MRSA    s/p Vancomycin 1g (5/22-5/24), s/p Meropenem 5/22, s/p ertapenem ended 5/14. F/u ID recs   7. Endocrine: FS, ISS, Lantus 8u qhs (started 5/14), Humulin 4u q6hr, Metformin 1000mg BID held at this time   8. VTE prophylaxis - SCDs, Upper extremity superficial vein thrombosis noted 5/17, repeat doppler done 5/23-shows stable superficial thrombosis -Lovenox 60mg daily LE Dopplers negative   9. GYN malignancy  - s/p Anastrazole, s/p Megace 80mg BID x3 wks , s/p Tamoxifen 5/1-5/17 (stopped due to upper extremity DVT). Once patient recovers from acute neurologic issue will reassess cancer treatment options   -  Avoid immunotherapy at this time given associated risk of autoimmune disease   10. Derm: monitor sacrum for s/s of pressure ulcer, now stage 2- continue with zinc oxide   11. Follow up PT/OT recs   12. Social work/palliative: DNR/DNI. Interdisciplinary meeting was done on 5/23

## 2019-05-27 LAB
ANION GAP SERPL CALC-SCNC: 9 MMOL/L — SIGNIFICANT CHANGE UP (ref 5–17)
BUN SERPL-MCNC: 15 MG/DL — SIGNIFICANT CHANGE UP (ref 7–23)
CALCIUM SERPL-MCNC: 9.6 MG/DL — SIGNIFICANT CHANGE UP (ref 8.4–10.5)
CHLORIDE SERPL-SCNC: 100 MMOL/L — SIGNIFICANT CHANGE UP (ref 96–108)
CO2 SERPL-SCNC: 31 MMOL/L — SIGNIFICANT CHANGE UP (ref 22–31)
CREAT SERPL-MCNC: 0.37 MG/DL — LOW (ref 0.5–1.3)
CULTURE RESULTS: SIGNIFICANT CHANGE UP
CULTURE RESULTS: SIGNIFICANT CHANGE UP
GLUCOSE SERPL-MCNC: 140 MG/DL — HIGH (ref 70–99)
HCT VFR BLD CALC: 25.5 % — LOW (ref 34.5–45)
HGB BLD-MCNC: 7.7 G/DL — LOW (ref 11.5–15.5)
MAGNESIUM SERPL-MCNC: 1.8 MG/DL — SIGNIFICANT CHANGE UP (ref 1.6–2.6)
MCHC RBC-ENTMCNC: 28.4 PG — SIGNIFICANT CHANGE UP (ref 27–34)
MCHC RBC-ENTMCNC: 30.2 GM/DL — LOW (ref 32–36)
MCV RBC AUTO: 94.1 FL — SIGNIFICANT CHANGE UP (ref 80–100)
NRBC # BLD: 0 /100 WBCS — SIGNIFICANT CHANGE UP (ref 0–0)
PLATELET # BLD AUTO: 274 K/UL — SIGNIFICANT CHANGE UP (ref 150–400)
POTASSIUM SERPL-MCNC: 4.2 MMOL/L — SIGNIFICANT CHANGE UP (ref 3.5–5.3)
POTASSIUM SERPL-SCNC: 4.2 MMOL/L — SIGNIFICANT CHANGE UP (ref 3.5–5.3)
RBC # BLD: 2.71 M/UL — LOW (ref 3.8–5.2)
RBC # FLD: 15.7 % — HIGH (ref 10.3–14.5)
SODIUM SERPL-SCNC: 140 MMOL/L — SIGNIFICANT CHANGE UP (ref 135–145)
SPECIMEN SOURCE: SIGNIFICANT CHANGE UP
SPECIMEN SOURCE: SIGNIFICANT CHANGE UP
WBC # BLD: 7.28 K/UL — SIGNIFICANT CHANGE UP (ref 3.8–10.5)
WBC # FLD AUTO: 7.28 K/UL — SIGNIFICANT CHANGE UP (ref 3.8–10.5)

## 2019-05-27 PROCEDURE — 99233 SBSQ HOSP IP/OBS HIGH 50: CPT | Mod: GC

## 2019-05-27 PROCEDURE — 99232 SBSQ HOSP IP/OBS MODERATE 35: CPT

## 2019-05-27 PROCEDURE — 99233 SBSQ HOSP IP/OBS HIGH 50: CPT

## 2019-05-27 RX ORDER — LIDOCAINE 4 G/100G
1 CREAM TOPICAL
Refills: 0 | Status: DISCONTINUED | OUTPATIENT
Start: 2019-05-27 | End: 2019-06-19

## 2019-05-27 RX ORDER — MAGNESIUM SULFATE 500 MG/ML
2 VIAL (ML) INJECTION ONCE
Refills: 0 | Status: COMPLETED | OUTPATIENT
Start: 2019-05-27 | End: 2019-05-27

## 2019-05-27 RX ADMIN — LIDOCAINE 1 APPLICATION(S): 4 CREAM TOPICAL at 12:15

## 2019-05-27 RX ADMIN — LINEZOLID 600 MILLIGRAM(S): 600 INJECTION, SOLUTION INTRAVENOUS at 00:29

## 2019-05-27 RX ADMIN — SENNA PLUS 2 TABLET(S): 8.6 TABLET ORAL at 21:44

## 2019-05-27 RX ADMIN — Medication 1000 MILLIGRAM(S): at 16:15

## 2019-05-27 RX ADMIN — ENOXAPARIN SODIUM 40 MILLIGRAM(S): 100 INJECTION SUBCUTANEOUS at 21:44

## 2019-05-27 RX ADMIN — Medication 100 MILLIGRAM(S): at 12:15

## 2019-05-27 RX ADMIN — ZINC OXIDE 1 APPLICATION(S): 200 OINTMENT TOPICAL at 13:39

## 2019-05-27 RX ADMIN — MIDODRINE HYDROCHLORIDE 10 MILLIGRAM(S): 2.5 TABLET ORAL at 13:40

## 2019-05-27 RX ADMIN — ZINC OXIDE 1 APPLICATION(S): 200 OINTMENT TOPICAL at 21:46

## 2019-05-27 RX ADMIN — MIDODRINE HYDROCHLORIDE 10 MILLIGRAM(S): 2.5 TABLET ORAL at 21:44

## 2019-05-27 RX ADMIN — Medication 1000 MILLIGRAM(S): at 15:25

## 2019-05-27 RX ADMIN — MIDODRINE HYDROCHLORIDE 10 MILLIGRAM(S): 2.5 TABLET ORAL at 06:31

## 2019-05-27 RX ADMIN — FLUDROCORTISONE ACETATE 0.1 MILLIGRAM(S): 0.1 TABLET ORAL at 06:31

## 2019-05-27 RX ADMIN — NYSTATIN CREAM 1 APPLICATION(S): 100000 CREAM TOPICAL at 06:34

## 2019-05-27 RX ADMIN — Medication 1000 MILLIGRAM(S): at 06:44

## 2019-05-27 RX ADMIN — Medication 1000 MILLIGRAM(S): at 07:44

## 2019-05-27 RX ADMIN — LINEZOLID 600 MILLIGRAM(S): 600 INJECTION, SOLUTION INTRAVENOUS at 12:15

## 2019-05-27 RX ADMIN — CHLORHEXIDINE GLUCONATE 15 MILLILITER(S): 213 SOLUTION TOPICAL at 10:08

## 2019-05-27 RX ADMIN — INSULIN HUMAN 2: 100 INJECTION, SOLUTION SUBCUTANEOUS at 18:02

## 2019-05-27 RX ADMIN — CHLORHEXIDINE GLUCONATE 15 MILLILITER(S): 213 SOLUTION TOPICAL at 21:44

## 2019-05-27 RX ADMIN — NYSTATIN CREAM 1 APPLICATION(S): 100000 CREAM TOPICAL at 18:03

## 2019-05-27 RX ADMIN — Medication 500 MILLIGRAM(S): at 12:14

## 2019-05-27 RX ADMIN — Medication 50 GRAM(S): at 10:09

## 2019-05-27 RX ADMIN — INSULIN GLARGINE 8 UNIT(S): 100 INJECTION, SOLUTION SUBCUTANEOUS at 00:29

## 2019-05-27 RX ADMIN — ZINC OXIDE 1 APPLICATION(S): 200 OINTMENT TOPICAL at 06:33

## 2019-05-27 NOTE — PROGRESS NOTE ADULT - PROBLEM SELECTOR PLAN 8
Hx of urinary retention/overflow incontinence   - Westbrook changed on 5/17     #PEG placement  - Pt s/p PEG placement 5/21   - Continue w/ Osmolite 1.2     #Cephalic vein thrombosis  - As above  - Was started on AC w/ Lovenox 60mg BID but given the location of thrombus, A/C was dc'd   - Repeat US of LUE 5/23 showed stable thrombus     #Hyperglycemia  - Continue Lantus 8 U at bedtime   - SSI and FSG     #Anxiety   - 2/2 ongoing medical issues and prognosis  - Continue w/ Ativan 0.5 mg q6h PRN  - Psych consulted. Appreciate further recs Hx of urinary retention/overflow incontinence   - [place] changed on 5/17     #PEG placement  - Pt s/p PEG placement 5/21   - Continue w/ Osmolite 1.2  - start lidocaine jelly on site as it has been causing patient discomfort     #Cephalic vein thrombosis  - As above  - Was started on AC w/ Lovenox 60mg BID but given the location of thrombus, A/C was dc'd   - Repeat US of LUE 5/23 showed stable thrombus

## 2019-05-27 NOTE — PROGRESS NOTE ADULT - SUBJECTIVE AND OBJECTIVE BOX
INTERVAL HISTORY:  Still some pain at the PEG site, but better with some topical cream. Breathing comfortably on CPAP.     REVIEW OF SYSTEMS:  As per HPI, otherwise negative for Constitutional, Eyes, Ears/Nose/Mouth/Throat, Neck, Cardiovascular, Respiratory, Gastrointestinal, Genitourinary, Skin, Endocrine, Musculoskeletal, Psychiatric, and Hematologic/Lymphatic.    VITAL SIGNS:  Vital Signs Last 24 Hrs  T(C): 37.4 (27 May 2019 08:35), Max: 37.4 (27 May 2019 08:35)  T(F): 99.3 (27 May 2019 08:35), Max: 99.3 (27 May 2019 08:35)  HR: 71 (27 May 2019 08:35) (64 - 78)  BP: 133/84 (27 May 2019 08:35) (103/70 - 133/84)  BP(mean): --  RR: 16 (27 May 2019 08:35) (12 - 22)  SpO2: 100% (27 May 2019 08:35) (97% - 100%)Vital Signs Last 24 Hrs  T(C): 37.1 (25 May 2019 09:09), Max: 37.5 (24 May 2019 14:00)  T(F): 98.7 (25 May 2019 09:09), Max: 99.5 (24 May 2019 14:00)  HR: 69 (25 May 2019 09:09) (64 - 79)  BP: 122/82 (25 May 2019 09:09) (84/54 - 128/59)  BP(mean): 85 (24 May 2019 15:00) (78 - 85)  RR: 20 (25 May 2019 09:09) (14 - 27)  SpO2: 100% (25 May 2019 09:09) (98% - 100%)    PHYSICAL EXAMINATION:  MS: awake, alert, follows commands, writes on notepad  CN: normal  Motor: deltoids 4, biceps/triceps 4+, finger ext 4; hip flex 2 R, 3 L, feet 0/5 b/l    LABS:    05-27    140  |  100  |  15  ----------------------------<  140<H>  4.2   |  31  |  0.37<L>    Ca    9.6      27 May 2019 07:14  Mg     1.8     05-27                        7.7    7.28  )-----------( 274      ( 27 May 2019 07:14 )             25.5

## 2019-05-27 NOTE — PROGRESS NOTE ADULT - ASSESSMENT
58 yo F PMHx polio, breast cancer, DM, Endometrial Cancer s/p exploratory laparotomy, enterolysis, SHAMIR, BSO, pelvic lymphadenectomy, low anterior resection mobilization of splenic flexure, end colostomy on 7/30/18, rectovaginal fistula, numerous admissions for urinary tract infection presented to [facility] in the setting of urosepsis. Hospital course complicated by ESBL E coli bacteremia ( completed ertapenem on 5/14 ) and respiratory failure requiring multiple intubations 2/2 AIDP, now s/p trach and PEG ( 5/21), being treated for sepsis 2/2 MRSA PNA w/ linezolid 58 yo F PMHx polio, breast cancer, DM, Endometrial Cancer s/p exploratory laparotomy, enterolysis, SHAMIR, BSO, pelvic lymphadenectomy, low anterior resection mobilization of splenic flexure, end colostomy on 7/30/18, rectovaginal fistula, numerous admissions for urinary tract infection presented to [facility] in the setting of urosepsis. Hospital course complicated by ESBL E coli bacteremia (completed ertapenem on 5/14 ) and respiratory failure requiring multiple intubations 2/2 AIDP, now s/p trach and PEG (5/21), being treated for sepsis 2/2 MRSA PNA w/ linezolid.

## 2019-05-27 NOTE — PROGRESS NOTE ADULT - SUBJECTIVE AND OBJECTIVE BOX
INTERVAL HPI/OVERNIGHT EVENTS:    SUBJECTIVE: Patient seen and examined at bedside.    OBJECTIVE:    VITAL SIGNS:  ICU Vital Signs Last 24 Hrs  T(C): 36.7 (27 May 2019 06:11), Max: 37.1 (26 May 2019 17:17)  T(F): 98 (27 May 2019 06:11), Max: 98.8 (26 May 2019 17:17)  HR: 66 (27 May 2019 06:11) (62 - 78)  BP: 103/70 (27 May 2019 06:11) (103/70 - 157/83)  BP(mean): --  ABP: --  ABP(mean): --  RR: 15 (27 May 2019 06:11) (12 - 22)  SpO2: 100% (27 May 2019 06:11) (97% - 100%)    Mode: AC/ CMV (Assist Control/ Continuous Mandatory Ventilation), RR (machine): 12, TV (machine): 320, FiO2: 40, PEEP: 5, ITime: 1, MAP: 9, PIP: 26    05-26 @ 07:01  -  05-27 @ 07:00  --------------------------------------------------------  IN: 0 mL / OUT: 1100 mL / NET: -1100 mL      CAPILLARY BLOOD GLUCOSE      POCT Blood Glucose.: 137 mg/dL (27 May 2019 06:29)      PHYSICAL EXAM:    General: NAD  HEENT: NC/AT; PERRL, clear conjunctiva  Neck: supple  Respiratory: lungs CTA b/l, no wheezes or rhonchi  Cardiovascular: +S1/S2; RRR, no murmurs, rubs, or gallops  Abdomen: soft, non-tender, non-distended; +BS in all 4 quadrants  Extremities: WWP, 2+ peripheral pulses b/l; no LE edema  Skin: normal color and turgor; no rash  Neurological: AOx3, no focal deficits noted    MEDICATIONS:  MEDICATIONS  (STANDING):  ascorbic acid 500 milliGRAM(s) Oral daily  chlorhexidine 0.12% Liquid 15 milliLiter(s) Oral Mucosa every 12 hours  dextrose 5%. 1000 milliLiter(s) (50 mL/Hr) IV Continuous <Continuous>  dextrose 50% Injectable 25 Gram(s) IV Push once  docusate sodium Liquid 100 milliGRAM(s) Oral daily  enoxaparin Injectable 40 milliGRAM(s) SubCutaneous every 24 hours  fludroCORTISONE 0.1 milliGRAM(s) Oral every 24 hours  Heparin 1000 Units / ml 2 milliLiter(s) 2 milliLiter(s) IV Push once  insulin glargine Injectable (LANTUS) 8 Unit(s) SubCutaneous at bedtime  insulin regular  human corrective regimen sliding scale   SubCutaneous every 6 hours  lidocaine 2% Gel 1 Application(s) Topical daily  linezolid    Suspension 600 milliGRAM(s) Enteral Tube every 12 hours  midodrine 10 milliGRAM(s) Oral every 8 hours  nystatin Cream 1 Application(s) Topical two times a day  senna 2 Tablet(s) Oral at bedtime  zinc oxide 20% Ointment 1 Application(s) Topical three times a day    MEDICATIONS  (PRN):  acetaminophen    Suspension .. 1000 milliGRAM(s) Oral every 8 hours PRN Moderate Pain (4 - 6)  dextrose 40% Gel 15 Gram(s) Oral once PRN Blood Glucose LESS THAN 70 milliGRAM(s)/deciliter  glucagon  Injectable 1 milliGRAM(s) IntraMuscular once PRN Glucose LESS THAN 70 milligrams/deciliter  LORazepam     Tablet 0.5 milliGRAM(s) Oral every 8 hours PRN Anxiety  sodium chloride 0.9% lock flush 10 milliLiter(s) IV Push every 1 hour PRN Pre/post blood products, medications, blood draw, and to maintain line patency      ALLERGIES:  Allergies    No Known Allergies    Intolerances    IVIG PRODUCT IS PRIGIVEN OR GAMMUNEX (Unknown)      LABS:                        8.4    6.69  )-----------( 307      ( 26 May 2019 07:30 )             27.4     05-26    139  |  101  |  17  ----------------------------<  123<H>  3.8   |  29  |  0.39<L>    Ca    9.6      26 May 2019 07:30  Mg     1.9     05-26            RADIOLOGY & ADDITIONAL TESTS: INTERVAL HPI/OVERNIGHT EVENTS: Patient tolerated CPAP well throughout the day yesterday; no acute events overnight.     SUBJECTIVE: Patient seen and examined at bedside. This morning the patient is already on CPAP and is breathing comfortably with this. Complains of some discomfort around the trach site but does not have any discharge or bleeding around the area; also does not have any difficulty breathing or noted strider. Otherwise, the patient reports no acute complaints; no HA, cp, abdominal pain, n/v/d, LE pain.     OBJECTIVE:    VITAL SIGNS:  ICU Vital Signs Last 24 Hrs  T(C): 36.7 (27 May 2019 06:11), Max: 37.1 (26 May 2019 17:17)  T(F): 98 (27 May 2019 06:11), Max: 98.8 (26 May 2019 17:17)  HR: 66 (27 May 2019 06:11) (62 - 78)  BP: 103/70 (27 May 2019 06:11) (103/70 - 157/83)  BP(mean): --  ABP: --  ABP(mean): --  RR: 15 (27 May 2019 06:11) (12 - 22)  SpO2: 100% (27 May 2019 06:11) (97% - 100%)    Mode: AC/ CMV (Assist Control/ Continuous Mandatory Ventilation), RR (machine): 12, TV (machine): 320, FiO2: 40, PEEP: 5, ITime: 1, MAP: 9, PIP: 26    05-26 @ 07:01  -  05-27 @ 07:00  --------------------------------------------------------  IN: 0 mL / OUT: 1100 mL / NET: -1100 mL      CAPILLARY BLOOD GLUCOSE      POCT Blood Glucose.: 137 mg/dL (27 May 2019 06:29)      PHYSICAL EXAM:    Constitutional: WDWN, NAD, trach/peg, sitting up in bed, interactive with the examiner  HEENT: PERRL, EOMI, no JVD, MMM  Respiratory: scattered rhonchi noted, on CPAP at this time, no respiratory distress  Cardiovascular: RRR, normal S1S2, no murmurs, rubs, or gallops  Gastrointestinal: soft, NTND, no masses palpable, BS normal x4, PEG in place C/D/I  Extremities: Warm, well perfused, 2+ radial and DP pulses b/l, no edema, no clubbing  Neurological: Awake alert oriented, unable to verbalize but follows commands, nods yes/no w/ good mentation, writes on pad and mouths words to express herself;  2-3/5 strength b/l LE, 4/5 strength b/l UE, unchanged from previous  Skin: Normal temperature, warm, dry    MEDICATIONS:  MEDICATIONS  (STANDING):  ascorbic acid 500 milliGRAM(s) Oral daily  chlorhexidine 0.12% Liquid 15 milliLiter(s) Oral Mucosa every 12 hours  dextrose 5%. 1000 milliLiter(s) (50 mL/Hr) IV Continuous <Continuous>  dextrose 50% Injectable 25 Gram(s) IV Push once  docusate sodium Liquid 100 milliGRAM(s) Oral daily  enoxaparin Injectable 40 milliGRAM(s) SubCutaneous every 24 hours  fludroCORTISONE 0.1 milliGRAM(s) Oral every 24 hours  Heparin 1000 Units / ml 2 milliLiter(s) 2 milliLiter(s) IV Push once  insulin glargine Injectable (LANTUS) 8 Unit(s) SubCutaneous at bedtime  insulin regular  human corrective regimen sliding scale   SubCutaneous every 6 hours  lidocaine 2% Gel 1 Application(s) Topical daily  linezolid    Suspension 600 milliGRAM(s) Enteral Tube every 12 hours  midodrine 10 milliGRAM(s) Oral every 8 hours  nystatin Cream 1 Application(s) Topical two times a day  senna 2 Tablet(s) Oral at bedtime  zinc oxide 20% Ointment 1 Application(s) Topical three times a day    MEDICATIONS  (PRN):  acetaminophen    Suspension .. 1000 milliGRAM(s) Oral every 8 hours PRN Moderate Pain (4 - 6)  dextrose 40% Gel 15 Gram(s) Oral once PRN Blood Glucose LESS THAN 70 milliGRAM(s)/deciliter  glucagon  Injectable 1 milliGRAM(s) IntraMuscular once PRN Glucose LESS THAN 70 milligrams/deciliter  LORazepam     Tablet 0.5 milliGRAM(s) Oral every 8 hours PRN Anxiety  sodium chloride 0.9% lock flush 10 milliLiter(s) IV Push every 1 hour PRN Pre/post blood products, medications, blood draw, and to maintain line patency    ALLERGIES:  Allergies    No Known Allergies    Intolerances    IVIG PRODUCT IS PRIGIVEN OR GAMMUNEX (Unknown)      LABS:                        8.4    6.69  )-----------( 307      ( 26 May 2019 07:30 )             27.4     05-26    139  |  101  |  17  ----------------------------<  123<H>  3.8   |  29  |  0.39<L>    Ca    9.6      26 May 2019 07:30  Mg     1.9     05-26      RADIOLOGY & ADDITIONAL TESTS: Reviewed.

## 2019-05-27 NOTE — PROGRESS NOTE ADULT - PROBLEM SELECTOR PLAN 2
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 Initial concern for adrenal insufficiency, stim test negative.  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 Initial concern for adrenal insufficiency, stim test negative.  - Continue florinef 0.1mg qd   - C/w midodrine 10mg TID, if maintaining SBP >100 through today, can decrease to 5mg TID tomorrow

## 2019-05-27 NOTE — PROGRESS NOTE ADULT - ASSESSMENT
60yo F HD29 with stage IV endometrial adenocarcinoma s/p staging surgery '18 and 1 round of chemotherapy 2/19 readmitted from [facility] with fever, previously admitted for management of symptomatic rectovaginal and enterovaginal fistulas. Presents on referral from [facility] with urosepsis. Rapid response called due to worsening respiratory status s/p intubation and MICU admission. She then was on regional GYN service however had respiratory weakness and was transferred to Medicine/tele HD9 until requiring intubation again HD10 and is now in MICU. 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 She was re-intubated on 5/13  and received plasmapheresis x5. Pt now has ET tube and PEG placed 5/21. She was made DNR/DNI 5/14 and on 5/17 found to have upper extremity DVT. Vitals stable this AM. Primary management per internal medicine.    1. Neuro: Weakness continues to improve- now status post IVIG x5 day course, s/p IVIG tx. 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 Pulm: Trach tube (5/21-, continue CPAP trials daily  DNR/DNI   3. Cardio: On Florinef, midodrine decreased to 10mg TID    4. FEN/GI: PEG 5/21- Repletion of electrolytes prn   5. : [place] for overflow incontinence. Recurrent UTIs. Suprapubic catheter possibly in the future. F/u urology recs     6. ID: Afebrile currently. Switched to Linezolid 600mg BID (5/24-). Last urine culture 5/22 reveals VRE and Sputum culture 5/22 and Bronchial wash 5/21 reveals MRSA    s/p Vancomycin 1g (5/22-5/24), s/p Meropenem 5/22, s/p ertapenem ended 5/14. F/u ID recs   7. Endocrine: FS, ISS, Lantus 8u qhs (started 5/14), Humulin 4u q6hr, Metformin 1000mg BID held at this time   8. VTE prophylaxis - SCDs, Upper extremity superficial vein thrombosis noted 5/17, repeat doppler done 5/23-shows stable superficial thrombosis -Lovenox 60mg daily LE Dopplers negative   9. GYN malignancy  - s/p Anastrazole, s/p Megace 80mg BID x3 wks , s/p Tamoxifen 5/1-5/17 (stopped due to upper extremity DVT). Once patient recovers from acute neurologic issue will reassess cancer treatment options   -  Avoid immunotherapy at this time given associated risk of autoimmune disease   10. Derm: monitor sacrum for s/s of pressure ulcer, now stage 2- continue with zinc oxide   11. Follow up PT/OT recs   12. Social work/palliative: DNR/DNI. Interdisciplinary meeting was done on 5/23

## 2019-05-27 NOTE — PROGRESS NOTE ADULT - PROBLEM SELECTOR PLAN 1
2/2 to demyelinating polyneuropathy (AIDP), patient has required 3 intubations this admission, now s/p trach (5/21) and s/p 4 doses of IVIG finished 5/11 and 5 rounds of plasmapheresis ( finished 5/21)  - C/w mechanical ventilation  - C/w frequent suctioning 2/2 to demyelinating polyneuropathy (AIDP), patient has required 3 intubations this admission, now s/p trach (5/21) and s/p 4 doses of IVIG finished 5/11 and 5 rounds of plasmapheresis ( finished 5/21)  - C/w mechanical ventilation  - continue to attempt to wean patient from ventilator. CPAP as long as patient can tolerate today and can attempt trach collar tomorrow if patient is still doing well  - C/w frequent suctioning 2/2 to demyelinating polyneuropathy (AIDP), patient has required 3 intubations this admission, now s/p trach (5/21) and s/p 4 doses of IVIG finished 5/11 and 5 rounds of plasmapheresis ( finished 5/21)  - C/w mechanical ventilation  - continue to attempt to wean patient from ventilator. CPAP as long as patient can tolerate today and can attempt trach collar tomorrow if patient is still doing well. Decreased pressure support from 10/5 to 7/5, tolerating well though noted to have short periods of apnea while sleeping. If tolerates CPAP throughout the day, will attempt trial of trach collar tomorrow  - C/w frequent suctioning

## 2019-05-27 NOTE — PROGRESS NOTE ADULT - SUBJECTIVE AND OBJECTIVE BOX
GYN PROGRESS NOTE    Patient evaluated at the bedside. No acute events. On CPAP currently. Patient reports that her throat discomfort is improved today after administration of cream to the trach site. Patient reports leg pain earlier today which resolved with repositioning and Tylenol. Denies CP/SOB/dizziness/nausea/vomiting/abdominal pain/calf pain. Joseph remains in place.     O:   T(C): 36.8 (05-27-19 @ 16:07), Max: 37.4 (05-27-19 @ 08:35)  HR: 84 (05-27-19 @ 16:07) (64 - 84)  BP: 105/65 (05-27-19 @ 16:07) (103/70 - 133/84)  RR: 18 (05-27-19 @ 16:07) (12 - 20)  SpO2: 99% (05-27-19 @ 16:44) (99% - 100%)  Wt(kg): --    GEN: patient appears well, sitting up comfortably, family at bedside   LUNGS: no respiratory distress  ABD: soft, nontender, ostomy well perfused   Pelvic: josehp draining yellow, cloudy thick urine   EXT: no calf tenderness, symmetric lower extremity         05-26 @ 07:01  -  05-27 @ 07:00  --------------------------------------------------------  IN: 0 mL / OUT: 1100 mL / NET: -1100 mL    05-27 @ 07:01  -  05-27 @ 18:31  --------------------------------------------------------  IN: 0 mL / OUT: 500 mL / NET: -500 mL

## 2019-05-27 NOTE — PROGRESS NOTE ADULT - PROBLEM SELECTOR PLAN 7
Stage IV endometrial adenocarcinoma; Per GYN-ONC w/ interval increase in tumor burden. Pt was treated with Anastrazole and initiated 3 weeks of megace 80mg BID followed by 3 weeks of tamoxifen. Pt was on tamoxifen and developed a left cephalic v. thrombus. Tamoxifen was dc'd on 5/17   - Per GYN, no anticipation of continuing chemotherapy Stage IV endometrial adenocarcinoma; Per GYN-ONC w/ interval increase in tumor burden. Pt was treated with Anastrazole and initiated 3 weeks of megace 80mg BID followed by 3 weeks of tamoxifen. Pt was on tamoxifen and developed a left cephalic v. thrombus. Tamoxifen was dc'd on 5/17   - Per GYN, no anticipation of continuing chemotherapy    #Hyperglycemia  - Continue Lantus 8 U at bedtime   - SSI and FSG     #Anxiety   - 2/2 ongoing medical issues and prognosis  - Continue w/ Ativan 0.5 mg q6h PRN  - Psych consulted. Appreciate further recs

## 2019-05-27 NOTE — PROGRESS NOTE ADULT - PROBLEM SELECTOR PLAN 4
Pt spiked to 101.6  on 5/22. UA  positive, UCx growing Enterococcus and Bronch wash growing MRSA; Was on vancomycin ( 5/22- 5/24) d/c due to Cx positive for VRE.  - Continue linezolid for a total of 7 days (5/24 - 5/30)   - BCx 5/22 NGTD

## 2019-05-27 NOTE — PROGRESS NOTE ADULT - ASSESSMENT
60yo F HD29 with stage IV endometrial adenocarcinoma s/p staging surgery '18 and 1 round of chemotherapy 2/19 readmitted from [facility] with fever, previously admitted for management of symptomatic rectovaginal and enterovaginal fistulas. Presents on referral from [facility] with urosepsis. Rapid response called due to worsening respiratory status s/p intubation and MICU admission. She then was on regional GYN service however had respiratory weakness and was transferred to Medicine/tele HD9 until requiring intubation again HD10 and is now in MICU. 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 She was re-intubated on 5/13  and received plasmapheresis x5. Pt now has ET tube and PEG placed 5/21. She was made DNR/DNI 5/14 and on 5/17 found to have upper extremity DVT. Vitals stable today. Primary management per internal medicine.    1. Neuro: Weakness continues to improve- now status post IVIG x5 day course, s/p IVIG tx. 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 Pulm: Trach tube (5/21-, continue CPAP trials daily  DNR/DNI   3. Cardio: On Florinef, midodrine decreased to 10mg TID    4. FEN/GI: PEG 5/21- Repletion of electrolytes prn   5. : [place] for overflow incontinence. Recurrent UTIs. Suprapubic catheter possibly in the future. F/u urology recs     6. ID: Afebrile currently. Switched to Linezolid 600mg BID (5/24-). Last urine culture 5/22 reveals VRE and Sputum culture 5/22 and Bronchial wash 5/21 reveals MRSA    s/p Vancomycin 1g (5/22-5/24), s/p Meropenem 5/22, s/p ertapenem ended 5/14. F/u ID recs   7. Endocrine: FS, ISS, Lantus 8u qhs (started 5/14), Humulin 4u q6hr, Metformin 1000mg BID held at this time   8. VTE prophylaxis - SCDs, Upper extremity superficial vein thrombosis noted 5/17, repeat doppler done 5/23-shows stable superficial thrombosis -Lovenox 60mg daily LE Dopplers negative   9. GYN malignancy  - s/p Anastrazole, s/p Megace 80mg BID x3 wks , s/p Tamoxifen 5/1-5/17 (stopped due to upper extremity DVT). Once patient recovers from acute neurologic issue will reassess cancer treatment options   -  Avoid immunotherapy at this time given associated risk of autoimmune disease   10. Derm: monitor sacrum for s/s of pressure ulcer, now stage 2- continue with zinc oxide   11. Follow up PT/OT recs   12. Social work/palliative: DNR/DNI. Interdisciplinary meeting was done on 5/23

## 2019-05-27 NOTE — PROGRESS NOTE ADULT - SUBJECTIVE AND OBJECTIVE BOX
GYN Progress Note    Patient seen at bedside.  Pain controlled overnight with tylenol.  Tolerating tube feeds.   Has not been OOB 2/2 weakness.   [place] in place.     Denies CP, palpitations, SOB, fever, chills, nausea, vomiting.    Vital Signs Last 24 Hrs  T(C): 36.7 (27 May 2019 06:11), Max: 37.1 (26 May 2019 17:17)  T(F): 98 (27 May 2019 06:11), Max: 98.8 (26 May 2019 17:17)  HR: 66 (27 May 2019 06:11) (62 - 78)  BP: 103/70 (27 May 2019 06:11) (103/70 - 157/83)  BP(mean): --  RR: 15 (27 May 2019 06:11) (12 - 22)  SpO2: 100% (27 May 2019 06:11) (97% - 100%)    Physical Exam:  Gen: No Acute Distress  Pulm: Normal work of breathing, on trach; tachypnic on CPAP trials  GI: soft, nontender, nondistended, +BS, no rebound, no guarding. Ostomy in place with air and brown stool   Ext: SCDs in place, wnl    I&O's Summary    26 May 2019 07:01  -  27 May 2019 07:00  --------------------------------------------------------  IN: 0 mL / OUT: 1100 mL / NET: -1100 mL      MEDICATIONS  (STANDING):  ascorbic acid 500 milliGRAM(s) Oral daily  chlorhexidine 0.12% Liquid 15 milliLiter(s) Oral Mucosa every 12 hours  dextrose 5%. 1000 milliLiter(s) (50 mL/Hr) IV Continuous <Continuous>  dextrose 50% Injectable 25 Gram(s) IV Push once  docusate sodium Liquid 100 milliGRAM(s) Oral daily  enoxaparin Injectable 40 milliGRAM(s) SubCutaneous every 24 hours  fludroCORTISONE 0.1 milliGRAM(s) Oral every 24 hours  Heparin 1000 Units / ml 2 milliLiter(s) 2 milliLiter(s) IV Push once  insulin glargine Injectable (LANTUS) 8 Unit(s) SubCutaneous at bedtime  insulin regular  human corrective regimen sliding scale   SubCutaneous every 6 hours  lidocaine 2% Gel 1 Application(s) Topical daily  linezolid    Suspension 600 milliGRAM(s) Enteral Tube every 12 hours  midodrine 10 milliGRAM(s) Oral every 8 hours  nystatin Cream 1 Application(s) Topical two times a day  senna 2 Tablet(s) Oral at bedtime  zinc oxide 20% Ointment 1 Application(s) Topical three times a day    MEDICATIONS  (PRN):  acetaminophen    Suspension .. 1000 milliGRAM(s) Oral every 8 hours PRN Moderate Pain (4 - 6)  dextrose 40% Gel 15 Gram(s) Oral once PRN Blood Glucose LESS THAN 70 milliGRAM(s)/deciliter  glucagon  Injectable 1 milliGRAM(s) IntraMuscular once PRN Glucose LESS THAN 70 milligrams/deciliter  LORazepam     Tablet 0.5 milliGRAM(s) Oral every 8 hours PRN Anxiety  sodium chloride 0.9% lock flush 10 milliLiter(s) IV Push every 1 hour PRN Pre/post blood products, medications, blood draw, and to maintain line patency      LABS:                        8.4    6.69  )-----------( 307      ( 26 May 2019 07:30 )             27.4     05-26    139  |  101  |  17  ----------------------------<  123<H>  3.8   |  29  |  0.39<L>    Ca    9.6      26 May 2019 07:30  Mg     1.9     05-26

## 2019-05-28 LAB
ANION GAP SERPL CALC-SCNC: 9 MMOL/L — SIGNIFICANT CHANGE UP (ref 5–17)
BUN SERPL-MCNC: 15 MG/DL — SIGNIFICANT CHANGE UP (ref 7–23)
CALCIUM SERPL-MCNC: 9.8 MG/DL — SIGNIFICANT CHANGE UP (ref 8.4–10.5)
CHLORIDE SERPL-SCNC: 100 MMOL/L — SIGNIFICANT CHANGE UP (ref 96–108)
CO2 SERPL-SCNC: 31 MMOL/L — SIGNIFICANT CHANGE UP (ref 22–31)
CREAT SERPL-MCNC: 0.39 MG/DL — LOW (ref 0.5–1.3)
GLUCOSE SERPL-MCNC: 130 MG/DL — HIGH (ref 70–99)
HCT VFR BLD CALC: 25.4 % — LOW (ref 34.5–45)
HGB BLD-MCNC: 7.5 G/DL — LOW (ref 11.5–15.5)
MAGNESIUM SERPL-MCNC: 1.9 MG/DL — SIGNIFICANT CHANGE UP (ref 1.6–2.6)
MCHC RBC-ENTMCNC: 27.7 PG — SIGNIFICANT CHANGE UP (ref 27–34)
MCHC RBC-ENTMCNC: 29.5 GM/DL — LOW (ref 32–36)
MCV RBC AUTO: 93.7 FL — SIGNIFICANT CHANGE UP (ref 80–100)
NRBC # BLD: 0 /100 WBCS — SIGNIFICANT CHANGE UP (ref 0–0)
PLATELET # BLD AUTO: 295 K/UL — SIGNIFICANT CHANGE UP (ref 150–400)
POTASSIUM SERPL-MCNC: 3.8 MMOL/L — SIGNIFICANT CHANGE UP (ref 3.5–5.3)
POTASSIUM SERPL-SCNC: 3.8 MMOL/L — SIGNIFICANT CHANGE UP (ref 3.5–5.3)
RBC # BLD: 2.71 M/UL — LOW (ref 3.8–5.2)
RBC # FLD: 15.6 % — HIGH (ref 10.3–14.5)
SODIUM SERPL-SCNC: 140 MMOL/L — SIGNIFICANT CHANGE UP (ref 135–145)
WBC # BLD: 8.81 K/UL — SIGNIFICANT CHANGE UP (ref 3.8–10.5)
WBC # FLD AUTO: 8.81 K/UL — SIGNIFICANT CHANGE UP (ref 3.8–10.5)

## 2019-05-28 PROCEDURE — 99233 SBSQ HOSP IP/OBS HIGH 50: CPT

## 2019-05-28 PROCEDURE — 99231 SBSQ HOSP IP/OBS SF/LOW 25: CPT

## 2019-05-28 PROCEDURE — 99233 SBSQ HOSP IP/OBS HIGH 50: CPT | Mod: GC

## 2019-05-28 RX ORDER — POTASSIUM CHLORIDE 20 MEQ
20 PACKET (EA) ORAL ONCE
Refills: 0 | Status: COMPLETED | OUTPATIENT
Start: 2019-05-28 | End: 2019-05-28

## 2019-05-28 RX ORDER — MIRTAZAPINE 45 MG/1
15 TABLET, ORALLY DISINTEGRATING ORAL AT BEDTIME
Refills: 0 | Status: DISCONTINUED | OUTPATIENT
Start: 2019-05-28 | End: 2019-05-28

## 2019-05-28 RX ORDER — MAGNESIUM SULFATE 500 MG/ML
1 VIAL (ML) INJECTION ONCE
Refills: 0 | Status: COMPLETED | OUTPATIENT
Start: 2019-05-28 | End: 2019-05-28

## 2019-05-28 RX ADMIN — LINEZOLID 600 MILLIGRAM(S): 600 INJECTION, SOLUTION INTRAVENOUS at 00:28

## 2019-05-28 RX ADMIN — LIDOCAINE 1 APPLICATION(S): 4 CREAM TOPICAL at 21:37

## 2019-05-28 RX ADMIN — Medication 1000 MILLIGRAM(S): at 22:06

## 2019-05-28 RX ADMIN — ZINC OXIDE 1 APPLICATION(S): 200 OINTMENT TOPICAL at 05:59

## 2019-05-28 RX ADMIN — CHLORHEXIDINE GLUCONATE 15 MILLILITER(S): 213 SOLUTION TOPICAL at 09:51

## 2019-05-28 RX ADMIN — Medication 20 MILLIEQUIVALENT(S): at 09:50

## 2019-05-28 RX ADMIN — Medication 1000 MILLIGRAM(S): at 10:28

## 2019-05-28 RX ADMIN — MIDODRINE HYDROCHLORIDE 10 MILLIGRAM(S): 2.5 TABLET ORAL at 14:16

## 2019-05-28 RX ADMIN — Medication 1000 MILLIGRAM(S): at 10:58

## 2019-05-28 RX ADMIN — Medication 100 GRAM(S): at 09:51

## 2019-05-28 RX ADMIN — LIDOCAINE 1 APPLICATION(S): 4 CREAM TOPICAL at 14:16

## 2019-05-28 RX ADMIN — Medication 1000 MILLIGRAM(S): at 00:28

## 2019-05-28 RX ADMIN — INSULIN GLARGINE 8 UNIT(S): 100 INJECTION, SOLUTION SUBCUTANEOUS at 00:27

## 2019-05-28 RX ADMIN — ZINC OXIDE 1 APPLICATION(S): 200 OINTMENT TOPICAL at 14:17

## 2019-05-28 RX ADMIN — ZINC OXIDE 1 APPLICATION(S): 200 OINTMENT TOPICAL at 22:28

## 2019-05-28 RX ADMIN — LINEZOLID 600 MILLIGRAM(S): 600 INJECTION, SOLUTION INTRAVENOUS at 12:33

## 2019-05-28 RX ADMIN — NYSTATIN CREAM 1 APPLICATION(S): 100000 CREAM TOPICAL at 18:24

## 2019-05-28 RX ADMIN — ENOXAPARIN SODIUM 40 MILLIGRAM(S): 100 INJECTION SUBCUTANEOUS at 21:37

## 2019-05-28 RX ADMIN — NYSTATIN CREAM 1 APPLICATION(S): 100000 CREAM TOPICAL at 06:00

## 2019-05-28 RX ADMIN — FLUDROCORTISONE ACETATE 0.1 MILLIGRAM(S): 0.1 TABLET ORAL at 05:59

## 2019-05-28 RX ADMIN — Medication 1000 MILLIGRAM(S): at 21:36

## 2019-05-28 RX ADMIN — INSULIN GLARGINE 8 UNIT(S): 100 INJECTION, SOLUTION SUBCUTANEOUS at 22:28

## 2019-05-28 RX ADMIN — Medication 500 MILLIGRAM(S): at 12:33

## 2019-05-28 RX ADMIN — MIDODRINE HYDROCHLORIDE 10 MILLIGRAM(S): 2.5 TABLET ORAL at 05:58

## 2019-05-28 RX ADMIN — CHLORHEXIDINE GLUCONATE 15 MILLILITER(S): 213 SOLUTION TOPICAL at 21:37

## 2019-05-28 RX ADMIN — MIDODRINE HYDROCHLORIDE 10 MILLIGRAM(S): 2.5 TABLET ORAL at 21:37

## 2019-05-28 RX ADMIN — Medication 1000 MILLIGRAM(S): at 01:28

## 2019-05-28 NOTE — PROGRESS NOTE ADULT - ASSESSMENT
60yo F HD30 with stage IV endometrial adenocarcinoma s/p staging surgery '18 and 1 round of chemotherapy 2/19 readmitted from [facility] with fever, previously admitted for management of symptomatic rectovaginal and enterovaginal fistulas.  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  Hospital course notable for prolonged intubation and MICU admission, transitioned to tracheostomy and PEG tube on 5/21, now weened off pressors and transferred to chronic vent unit on [facility].     1. Neuro: primary diagnosis of AIDP/GBS, s/p IVIG and plasmapheresis.  Weakness continues to improve however still with persistent respiratory weakness.  Neuro following.  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 Pulm: Tracheostomy (5/21-), tolerating short trials of CPAP, otherwise vent dependent  3. Cardio: On Florinef, midodrine decreased to 10mg TID, s/p levo gtt  4. FEN/GI: PEG 5/21- Repletion of electrolytes prn   5. : Indwelling joseph in place, recurrent UTIs; consider possible suprapubic catheter in the future (urology recs)  6. ID: Afebrile currently. Switched to Linezolid 600mg BID (5/24-). Last urine culture 5/22 reveals VRE and Sputum culture 5/22 and Bronchial wash 5/21 reveals MRSA s/p Vancomycin 1g (5/22-5/24), s/p Meropenem 5/22, s/p ertapenem ended 5/14. F/u ID recs   7. Endocrine: FS, ISS, Lantus 8u qhs (started 5/14)  8. VTE prophylaxis - SCDs, Upper extremity superficial vein thrombosis noted 5/17, repeat doppler done 5/23-shows stable superficial thrombosis -Lovenox 40mg daily LE Dopplers negative   9. GYN malignancy  - s/p Anastrazole, s/p Megace 80mg BID x3 wks , s/p Tamoxifen 5/1-5/17 (stopped due to upper extremity DVT). Once patient recovers from acute neurologic issue will reassess cancer treatment options   -  Avoid immunotherapy at this time given associated risk of autoimmune disease   10. Derm: monitor sacrum for s/s of pressure ulcer, now stage 2- continue with zinc oxide   11. Follow up PT/OT recs   12. Social work/palliative: DNR/DNI. Interdisciplinary meeting was done on 5/23

## 2019-05-28 NOTE — PROGRESS NOTE ADULT - PROBLEM SELECTOR PLAN 1
2/2 to demyelinating polyneuropathy (AIDP), patient has required 3 intubations this admission, now s/p trach (5/21) and s/p 4 doses of IVIG finished 5/11 and 5 rounds of plasmapheresis ( finished 5/21)  - C/w mechanical ventilation  - continue to attempt to wean patient from ventilator. CPAP as long as patient can tolerate today and can attempt trach collar tomorrow if patient is still doing well. Decreased pressure support from 10/5 to 7/5, tolerating well though noted to have short periods of apnea while sleeping. If tolerates CPAP throughout the day, will attempt trial of trach collar tomorrow  - C/w frequent suctioning

## 2019-05-28 NOTE — PROGRESS NOTE ADULT - ATTENDING COMMENTS
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  Appears to have relapsed on this admission, however decline immediately following IVIG may have been part of the same episode.  Improvement in limb, facial and EOM strength following PLX, although from respiratory standpoint remains weak, failing trials of extubation.    Suspect her resp weakness is multifactorial.  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      Today exam appears stable.  TV low on CPAP, remains vented.    AAOx3, EOMI, face symm with some residual weakness on forceful eye closure.  Arms 4-4+/5.  R leg 2+/5, L leg 1-2/5.  Areflexic.    Given she is at high risk of recurrent infection, keeping central line in place for PLX is risky, therefore would cont with IVIG upojn discharge.  Next dose can be given in 1-2 weeks at a dose of 0/5g/kg [id number].  PT  Additional management as per primary team

## 2019-05-28 NOTE — PROGRESS NOTE BEHAVIORAL HEALTH - NSBHFUPINTERVALCCFT_PSY_A_CORE
Ms. Baker is a 59 year old [place]American female, [place] Jewish, single, no children, currently unemployed, living with her disabled brother (who had a stroke and is wheelchair bound) and his home health aide in [place],  PPH of depression since 2018, no history of psych hospitalizations, PMH of polio, breast cancer, DM, endometrial cancer s/p exploratory laparotomy, enterolysis, SHAMIR, RICH, pelvic lymphadenectomy, low anterior resection mobilization of splenic flexure, end colostomy on 7/30/18, rectovaginal fistula, numerous admissions for urinary tract infection presented to [facility] in the setting of urosepsis. Hospital course complicated by blood stream infection (currently on ertapenem) and respiratory failure requiring intubation s/p extubation on 5/2. Per collateral information patient became noticeably weak mala in her hands for approx 1 week prior to being diagnosed with IDP in April, suggesting a more acute process. 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     Writer met with pt individually at bedside. Pt was oriented x 4. Pt was unable to speak but communicated with writer via writing on notepad. Pt was well-related and help-seeking. Pt presents with several symptoms of depression, including crying spells, feelings of sadness, hopelessness, anxiety, pessimism, helplessness, and passive SI without plan or intent. Pt was tearful and reported feeling angry, and hopeless in the context of ongoing medical problems. She reported that she saw a psychiatrist in 2018 and was prescribed Xanax, however she did not find it to be helpful. Pt denies symptoms of confusion/paranoia/AH/VH/dee/HI.

## 2019-05-28 NOTE — PROGRESS NOTE ADULT - ATTENDING COMMENTS
Patient was seen and examined with the resident team today.  I agree with Dr. Maya's assessment and plan with the following exceptions/additions:     This is a 58yo woman with a PMH of polio, breast cancer, endometrial cancer s/p explap, enterolysis, SHAMIR, BSO, pelvic lymphadenectomy, low anterior resection mobilization of splenic flexure and end colostomy (7/30/18), rectovaginal fistula, as well as, numerous admissions for urinary tract infections who was initially admitted for ESBL E coli bacteremia now s/p a protracted hospital course.  Admission has been complicated by hypercapnic respiratory failure requiring multiple intubations 2/2 AIDP (s/p IVIG and plasmapheresis), now s/p trach and PEG ( 5/21) and sepsis 2/2 MRSA HAP/VAP and VRE UTI.      NAEO over the weekend.  Tolerated pressure support on 10/5 this AM.  Awaking and animated communicating mostly via notepad but occasionally mouthing words.  Describes hope that she will one day be able to come off the ventilator.      Exam - NCAT, MMM, clear OP, +trach at midline w/crusted dressing but no discharge, rhonchi anteriorly, RRR no m/g/r, +ostomy with pink stoma, +PEG with TFs, RUE +PICC, WWP, BLE diffuse muscle atrophy, III/V BLE motor with hip flexion but I-II/V w/foot flexion, AOx3.     -- c/w pressure support as tolerated but would ensure back on AC/VC overnight  -- SW, RE: LTAC  -- c/w Linezolid until 5/30  -- c/w TFs  -- daily PT and OT if able  -- c/w Midodrine and Florinef and wean as able  -- IVIG [id number]   -- c/w insulin for FS goal <160  -- Neuro following, appreciate assistance  -- Gyn following, appreciate assistance   -- DVT PPx - Lovenox  -- Dispo - LTAC, Pall care assisting    Maya Christian  [phone] Patient was seen and examined with the resident team today.  I agree with Dr. Maya's assessment and plan with the following exceptions/additions:     This is a 58yo woman with a PMH of polio c/b post-polio syndrome, breast cancer, endometrial cancer s/p explap, enterolysis, SHAMIR, BSO, pelvic lymphadenectomy, low anterior resection mobilization of splenic flexure and end colostomy (7/30/18), rectovaginal fistula, as well as, numerous admissions for urinary tract infections who was initially admitted for ESBL E. coli bacteremia c/b septic shock, now s/p a protracted hospital course.  Admission has been complicated by acute (now chronic) hypercapnic respiratory failure requiring multiple intubations 2/2 AIDP (s/p IVIG and plasmapheresis), now s/p trach and PEG ( 5/21) and sepsis 2/2 MRSA HAP/VAP and VRE UTI.      NAEO over the weekend.  Tolerated pressure support on 10/5 this AM.  Awaking and animated communicating mostly via notepad but occasionally mouthing words.  Describes hope that she will one day be able to come off the ventilator.      Exam - NCAT, MMM, clear OP, +trach at midline w/crusted dressing but no discharge, rhonchi anteriorly, RRR no m/g/r, +ostomy with pink stoma, +PEG with TFs, RUE +PICC, WWP, BLE diffuse muscle atrophy, III/V BLE motor with hip flexion but I-II/V w/foot flexion, AOx3.     -- c/w pressure support as tolerated but would ensure back on AC/VC overnight  -- SW, RE: LTAC  -- c/w Linezolid until 5/30  -- c/w TFs  -- daily PT and OT if able  -- c/w Midodrine and Florinef and wean as able  -- IVIG [id number]   -- c/w insulin for FS goal <160  -- Neuro following, appreciate assistance  -- Gyn following, appreciate assistance   -- DVT PPx - Lovenox  -- Dispo - LTAC, Pall care assisting    Maya Christian  [phone]

## 2019-05-28 NOTE — PROGRESS NOTE ADULT - PROBLEM SELECTOR PLAN 3
Improving. 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 s/p 4 rounds of IVIG and 5 rounds of plasmapheresis (completed 5/21). 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  - C/w plan as per above problem 1 & 2  - Start 0.5g/kg of IVIG every 2 weeks

## 2019-05-28 NOTE — PROGRESS NOTE ADULT - PROBLEM SELECTOR PLAN 2
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 Initial concern for adrenal insufficiency, stim test negative.  - Continue florinef 0.1mg qd   - C/w midodrine 10mg TID, if maintaining SBP >100 through today, can decrease to 5mg TID tomorrow

## 2019-05-28 NOTE — PROGRESS NOTE ADULT - SUBJECTIVE AND OBJECTIVE BOX
Neurology Progress Note    Interval Events: Patient on apnea alarm when initially examined. Switched back to CPAP.   ROS: Denies HA, CP, SOB, n/v, changes in bowel/urinary habits.  12pt ROS otherwise negative.    VITALS  Vital Signs Last 24 Hrs  T(C): 36.8 (28 May 2019 04:56), Max: 37.4 (27 May 2019 08:35)  T(F): 98.3 (28 May 2019 04:56), Max: 99.3 (27 May 2019 08:35)  HR: 70 (28 May 2019 05:04) (66 - 84)  BP: 118/78 (28 May 2019 04:56) (102/69 - 133/84)  BP(mean): --  RR: 14 (28 May 2019 04:56) (13 - 18)  SpO2: 99% (28 May 2019 05:04) (99% - 100%)    CAPILLARY BLOOD GLUCOSE      POCT Blood Glucose.: 138 mg/dL (28 May 2019 05:55)  POCT Blood Glucose.: 140 mg/dL (28 May 2019 00:15)  POCT Blood Glucose.: 165 mg/dL (27 May 2019 17:58)  POCT Blood Glucose.: 136 mg/dL (27 May 2019 12:05)      PHYSICAL EXAM  General: A&Ox3; NAD  Head: NC/AT; MMM; PERRL; EOMI;  Neck: Supple; no JVD  Respiratory: CTA B/L; no wheezes/crackles   Cardiovascular: Regular rhythm/rate; S1/S2   Gastrointestinal: Soft; NTND; normoactive BS  Extremities: WWP; no edema/cyanosis  Neurological:  CN intact, Motor 4/5 B/L Upper Extremity Deltoid, Triceps, Biceps, RLE 2/5 at hip, LLE 3/5 at hip, Bilateral Ankle 0/5. Sensory intact to light touch, Reflexes absent     MEDICATIONS  (STANDING):  ascorbic acid 500 milliGRAM(s) Oral daily  chlorhexidine 0.12% Liquid 15 milliLiter(s) Oral Mucosa every 12 hours  dextrose 5%. 1000 milliLiter(s) (50 mL/Hr) IV Continuous <Continuous>  dextrose 50% Injectable 25 Gram(s) IV Push once  docusate sodium Liquid 100 milliGRAM(s) Oral daily  enoxaparin Injectable 40 milliGRAM(s) SubCutaneous every 24 hours  fludroCORTISONE 0.1 milliGRAM(s) Oral every 24 hours  Heparin 1000 Units / ml 2 milliLiter(s) 2 milliLiter(s) IV Push once  insulin glargine Injectable (LANTUS) 8 Unit(s) SubCutaneous at bedtime  insulin regular  human corrective regimen sliding scale   SubCutaneous every 6 hours  lidocaine 2% Gel 1 Application(s) Topical daily  linezolid    Suspension 600 milliGRAM(s) Enteral Tube every 12 hours  midodrine 10 milliGRAM(s) Oral every 8 hours  nystatin Cream 1 Application(s) Topical two times a day  senna 2 Tablet(s) Oral at bedtime  zinc oxide 20% Ointment 1 Application(s) Topical three times a day    MEDICATIONS  (PRN):  acetaminophen    Suspension .. 1000 milliGRAM(s) Oral every 8 hours PRN Moderate Pain (4 - 6)  dextrose 40% Gel 15 Gram(s) Oral once PRN Blood Glucose LESS THAN 70 milliGRAM(s)/deciliter  glucagon  Injectable 1 milliGRAM(s) IntraMuscular once PRN Glucose LESS THAN 70 milligrams/deciliter  lidocaine 2% Gel 1 Application(s) Topical two times a day PRN pain around PEG site  LORazepam     Tablet 0.5 milliGRAM(s) Oral every 8 hours PRN Anxiety  sodium chloride 0.9% lock flush 10 milliLiter(s) IV Push every 1 hour PRN Pre/post blood products, medications, blood draw, and to maintain line patency      IVIG PRODUCT IS PRIGIVEN OR GAMMUNEX (Unknown)  No Known Allergies      LABS                        7.5    8.81  )-----------( 295      ( 28 May 2019 06:09 )             25.4     05-28    140  |  100  |  15  ----------------------------<  130<H>  3.8   |  31  |  0.39<L>    Ca    9.8      28 May 2019 06:09  Mg     1.9     05-28 Neurology Progress Note    Interval Events: Patient on apnea alarm when initially examined. Switched back to CPAP. Patient states she wants to pursue treatment at this time   ROS: Denies HA, CP, SOB, n/v, changes in bowel/urinary habits.  12pt ROS otherwise negative.    VITALS  Vital Signs Last 24 Hrs  T(C): 36.8 (28 May 2019 04:56), Max: 37.4 (27 May 2019 08:35)  T(F): 98.3 (28 May 2019 04:56), Max: 99.3 (27 May 2019 08:35)  HR: 70 (28 May 2019 05:04) (66 - 84)  BP: 118/78 (28 May 2019 04:56) (102/69 - 133/84)  BP(mean): --  RR: 14 (28 May 2019 04:56) (13 - 18)  SpO2: 99% (28 May 2019 05:04) (99% - 100%)    CAPILLARY BLOOD GLUCOSE      POCT Blood Glucose.: 138 mg/dL (28 May 2019 05:55)  POCT Blood Glucose.: 140 mg/dL (28 May 2019 00:15)  POCT Blood Glucose.: 165 mg/dL (27 May 2019 17:58)  POCT Blood Glucose.: 136 mg/dL (27 May 2019 12:05)      PHYSICAL EXAM  General: A&Ox3; NAD  Head: NC/AT; MMM; PERRL; EOMI;  Neck: Supple; no JVD  Respiratory: CTA B/L; no wheezes/crackles   Cardiovascular: Regular rhythm/rate; S1/S2   Gastrointestinal: Soft; NTND; normoactive BS  Extremities: WWP; no edema/cyanosis  Neurological:  CN intact, Motor 4/5 RUE, 5/5LUE Deltoid, Triceps, Biceps, RLE 2/5 at hip, LLE 3/5 at hip, Bilateral Ankle 0/5. Sensory intact to light touch, Reflexes absent     MEDICATIONS  (STANDING):  ascorbic acid 500 milliGRAM(s) Oral daily  chlorhexidine 0.12% Liquid 15 milliLiter(s) Oral Mucosa every 12 hours  dextrose 5%. 1000 milliLiter(s) (50 mL/Hr) IV Continuous <Continuous>  dextrose 50% Injectable 25 Gram(s) IV Push once  docusate sodium Liquid 100 milliGRAM(s) Oral daily  enoxaparin Injectable 40 milliGRAM(s) SubCutaneous every 24 hours  fludroCORTISONE 0.1 milliGRAM(s) Oral every 24 hours  Heparin 1000 Units / ml 2 milliLiter(s) 2 milliLiter(s) IV Push once  insulin glargine Injectable (LANTUS) 8 Unit(s) SubCutaneous at bedtime  insulin regular  human corrective regimen sliding scale   SubCutaneous every 6 hours  lidocaine 2% Gel 1 Application(s) Topical daily  linezolid    Suspension 600 milliGRAM(s) Enteral Tube every 12 hours  midodrine 10 milliGRAM(s) Oral every 8 hours  nystatin Cream 1 Application(s) Topical two times a day  senna 2 Tablet(s) Oral at bedtime  zinc oxide 20% Ointment 1 Application(s) Topical three times a day    MEDICATIONS  (PRN):  acetaminophen    Suspension .. 1000 milliGRAM(s) Oral every 8 hours PRN Moderate Pain (4 - 6)  dextrose 40% Gel 15 Gram(s) Oral once PRN Blood Glucose LESS THAN 70 milliGRAM(s)/deciliter  glucagon  Injectable 1 milliGRAM(s) IntraMuscular once PRN Glucose LESS THAN 70 milligrams/deciliter  lidocaine 2% Gel 1 Application(s) Topical two times a day PRN pain around PEG site  LORazepam     Tablet 0.5 milliGRAM(s) Oral every 8 hours PRN Anxiety  sodium chloride 0.9% lock flush 10 milliLiter(s) IV Push every 1 hour PRN Pre/post blood products, medications, blood draw, and to maintain line patency      IVIG PRODUCT IS PRIGIVEN OR GAMMUNEX (Unknown)  No Known Allergies      LABS                        7.5    8.81  )-----------( 295      ( 28 May 2019 06:09 )             25.4     05-28    140  |  100  |  15  ----------------------------<  130<H>  3.8   |  31  |  0.39<L>    Ca    9.8      28 May 2019 06:09  Mg     1.9     05-28

## 2019-05-28 NOTE — PROGRESS NOTE ADULT - ASSESSMENT
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 Noted today to have triggered apnea alarm upon start of exam. Restarted CPAP with patient taking Vt of 250-280 at rate of 20-25. Still attempting to wean from vent, patient was unable to try Trach Collar yesterday. Would benefit from as much mobilization as nursing staff can provide.     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 Noted today to have triggered apnea alarm upon start of exam. Restarted CPAP with patient taking Vt of 250-280 at rate of 20-25. Still attempting to wean from vent, patient was unable to try Trach Collar yesterday. Would benefit from as much mobilization as nursing staff can provide.     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 Noted today to have triggered apnea alarm upon start of exam. Restarted CPAP with patient taking Vt of 250-280 at rate of 20-25. Still attempting to wean from vent, patient was unable to try Trach Collar yesterday. Would benefit from as much mobilization as nursing staff can provide.     -Given high risk of infection plasmapheresis not an optimal therapy at this time  -Patient had reoccurrence of symptoms within days after last IVIg therapy therefore would not do typical monthly infusions  -Plan for IVIG 0.5 g/kg q2w  -Continue attempts to wean from vent  -Recommend sitting up in a chair position as much as possible  -Continue supportive care

## 2019-05-28 NOTE — PROGRESS NOTE ADULT - PROBLEM SELECTOR PLAN 8
Hx of urinary retention/overflow incontinence   - [place] changed on 5/17     #PEG placement  - Pt s/p PEG placement 5/21   - Continue w/ Osmolite 1.2  - start lidocaine jelly on site as it has been causing patient discomfort     #Cephalic vein thrombosis  - As above  - Was started on AC w/ Lovenox 60mg BID but given the location of thrombus, A/C was dc'd   - Repeat US of LUE 5/23 showed stable thrombus

## 2019-05-28 NOTE — PROGRESS NOTE BEHAVIORAL HEALTH - NSBHFUPINTERVALHXFT_PSY_A_CORE
Very depressed earlier in the day and worried and saddened by the prospect of her own mortality. When I saw her later, her sister and 2 nieces had come to visit and she seemed much brighter. Agreed to take remeron 15 mg qhs prn for insomnia. Wants more supportive therapy.

## 2019-05-28 NOTE — PROGRESS NOTE ADULT - PROBLEM SELECTOR PLAN 7
Stage IV endometrial adenocarcinoma; Per GYN-ONC w/ interval increase in tumor burden. Pt was treated with Anastrazole and initiated 3 weeks of megace 80mg BID followed by 3 weeks of tamoxifen. Pt was on tamoxifen and developed a left cephalic v. thrombus. Tamoxifen was dc'd on 5/17   - Per GYN, no anticipation of continuing chemotherapy    #Hyperglycemia  - Continue Lantus 8 U at bedtime   - SSI and FSG     #Anxiety   - 2/2 ongoing medical issues and prognosis  - Continue w/ Ativan 0.5 mg q6h PRN  - Psych consulted. Appreciate further recs

## 2019-05-28 NOTE — PROGRESS NOTE ADULT - SUBJECTIVE AND OBJECTIVE BOX
HOSPITAL COURSE:  60 yo F PMHx polio, breast cancer, Endometrial Cancer s/p exploratory laparotomy, enterolysis, SHAMIR, BSO, pelvic lymphadenectomy, low anterior resection mobilization of splenic flexure, end colostomy on 7/30/18, rectovaginal fistula, numerous admissions for urinary tract infection initially admitted for ESBL E coli bacteremia. Hospital course complicated by hypercapnic respiratory failure requiring multiple intubations 2/2 AIDP ( completed both IVIG and plasmapheresis), now s/p trach and PEG ( 5/21), being treated for sepsis 2/2 MRSA in sputum and VRE in urine. S/D to [facility] 4 uris, on daily CPAP trials. Linezolid will finish on 5/30 for MRSA PNA.     INTERVAL HPI/OVERNIGHT EVENTS:  Patient was seen and examined at bedside. KESHAWN o/n, patient in good spirits this AM, remains optimistic regarding care. On CPAP was on pressure support 12 peep 5, changed to 10 and 5. No new active complaints.    VITAL SIGNS:  T(F): 99.7 (05-28-19 @ 09:45)  HR: 75 (05-28-19 @ 09:45)  BP: 128/85 (05-28-19 @ 09:45)  RR: 19 (05-28-19 @ 09:45)  SpO2: 100% (05-28-19 @ 09:45)  Wt(kg): --    PHYSICAL EXAM:  Constitutional: WDWN, NAD, trach/peg, sitting up in bed, interactive with the examiner, uses alphabet board  HEENT: PERRL, EOMI, no JVD, MMM  Respiratory: scattered rhonchi noted, on CPAP, tidal volumes 250s  Cardiovascular: RRR, normal S1S2, no murmurs, rubs, or gallops  Gastrointestinal: soft, NTND, no masses palpable, BS normal x4, PEG in place C/D/I  Extremities: Warm, well perfused, 2+ radial and DP pulses b/l, no edema, no clubbing  Neurological: Awake alert oriented, unable to verbalize but follows commands, nods yes/no w/ good mentation, writes on pad and mouths words to express herself;  2-3/5 strength b/l LE, 4/5 strength b/l UE, unchanged from previous  Skin: Normal temperature, warm, dry    MEDICATIONS  (STANDING):  ascorbic acid 500 milliGRAM(s) Oral daily  chlorhexidine 0.12% Liquid 15 milliLiter(s) Oral Mucosa every 12 hours  dextrose 5%. 1000 milliLiter(s) (50 mL/Hr) IV Continuous <Continuous>  dextrose 50% Injectable 25 Gram(s) IV Push once  docusate sodium Liquid 100 milliGRAM(s) Oral daily  enoxaparin Injectable 40 milliGRAM(s) SubCutaneous every 24 hours  fludroCORTISONE 0.1 milliGRAM(s) Oral every 24 hours  Heparin 1000 Units / ml 2 milliLiter(s) 2 milliLiter(s) IV Push once  insulin glargine Injectable (LANTUS) 8 Unit(s) SubCutaneous at bedtime  insulin regular  human corrective regimen sliding scale   SubCutaneous every 6 hours  lidocaine 2% Gel 1 Application(s) Topical daily  linezolid    Suspension 600 milliGRAM(s) Enteral Tube every 12 hours  midodrine 10 milliGRAM(s) Oral every 8 hours  nystatin Cream 1 Application(s) Topical two times a day  senna 2 Tablet(s) Oral at bedtime  zinc oxide 20% Ointment 1 Application(s) Topical three times a day    MEDICATIONS  (PRN):  acetaminophen    Suspension .. 1000 milliGRAM(s) Oral every 8 hours PRN Moderate Pain (4 - 6)  dextrose 40% Gel 15 Gram(s) Oral once PRN Blood Glucose LESS THAN 70 milliGRAM(s)/deciliter  glucagon  Injectable 1 milliGRAM(s) IntraMuscular once PRN Glucose LESS THAN 70 milligrams/deciliter  lidocaine 2% Gel 1 Application(s) Topical two times a day PRN pain around PEG site  LORazepam     Tablet 0.5 milliGRAM(s) Oral every 8 hours PRN Anxiety  sodium chloride 0.9% lock flush 10 milliLiter(s) IV Push every 1 hour PRN Pre/post blood products, medications, blood draw, and to maintain line patency      Allergies    No Known Allergies    Intolerances    IVIG PRODUCT IS PRIGIVEN OR GAMMUNEX (Unknown)      LABS:                        7.5    8.81  )-----------( 295      ( 28 May 2019 06:09 )             25.4     05-28    140  |  100  |  15  ----------------------------<  130<H>  3.8   |  31  |  0.39<L>    Ca    9.8      28 May 2019 06:09  Mg     1.9     05-28            RADIOLOGY & ADDITIONAL TESTS:  Reviewed

## 2019-05-28 NOTE — CHART NOTE - NSCHARTNOTEFT_GEN_A_CORE
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 Provides: 1056mL TV, 1367kcal, 74g protein, 866mL free H2O, 106% RDI, 1.55g/kg IBW protein, 22.5 non pro kcal    GI Issues: no noted n/v/d/c, colostomy with 200ml output 5/25, no further output recorded     Pain: no pain noted     Skin Integrity: stage 2 pressure ulcer     Labs:   05-28    140  |  100  |  15  ----------------------------<  130<H>  3.8   |  31  |  0.39<L>    Ca    9.8      28 May 2019 06:09  Mg     1.9     05-28      CAPILLARY BLOOD GLUCOSE      POCT Blood Glucose.: 138 mg/dL (28 May 2019 05:55)  POCT Blood Glucose.: 140 mg/dL (28 May 2019 00:15)  POCT Blood Glucose.: 165 mg/dL (27 May 2019 17:58)  POCT Blood Glucose.: 136 mg/dL (27 May 2019 12:05)      Medications:  MEDICATIONS  (STANDING):  ascorbic acid 500 milliGRAM(s) Oral daily  chlorhexidine 0.12% Liquid 15 milliLiter(s) Oral Mucosa every 12 hours  dextrose 5%. 1000 milliLiter(s) (50 mL/Hr) IV Continuous <Continuous>  dextrose 50% Injectable 25 Gram(s) IV Push once  docusate sodium Liquid 100 milliGRAM(s) Oral daily  enoxaparin Injectable 40 milliGRAM(s) SubCutaneous every 24 hours  fludroCORTISONE 0.1 milliGRAM(s) Oral every 24 hours  Heparin 1000 Units / ml 2 milliLiter(s) 2 milliLiter(s) IV Push once  insulin glargine Injectable (LANTUS) 8 Unit(s) SubCutaneous at bedtime  insulin regular  human corrective regimen sliding scale   SubCutaneous every 6 hours  lidocaine 2% Gel 1 Application(s) Topical daily  linezolid    Suspension 600 milliGRAM(s) Enteral Tube every 12 hours  midodrine 10 milliGRAM(s) Oral every 8 hours  nystatin Cream 1 Application(s) Topical two times a day  senna 2 Tablet(s) Oral at bedtime  zinc oxide 20% Ointment 1 Application(s) Topical three times a day    MEDICATIONS  (PRN):  acetaminophen    Suspension .. 1000 milliGRAM(s) Oral every 8 hours PRN Moderate Pain (4 - 6)  dextrose 40% Gel 15 Gram(s) Oral once PRN Blood Glucose LESS THAN 70 milliGRAM(s)/deciliter  glucagon  Injectable 1 milliGRAM(s) IntraMuscular once PRN Glucose LESS THAN 70 milligrams/deciliter  lidocaine 2% Gel 1 Application(s) Topical two times a day PRN pain around PEG site  LORazepam     Tablet 0.5 milliGRAM(s) Oral every 8 hours PRN Anxiety  sodium chloride 0.9% lock flush 10 milliLiter(s) IV Push every 1 hour PRN Pre/post blood products, medications, blood draw, and to maintain line patency      Weight:   58.3kg (4/30)  56.2kg (5/9)  53.3kg (5/24)     Weight Change: -5kg loss since admission     Nutrition Focused Physical Exam: Completed [   ]  Not Pertinent [  x ]    Estimated energy needs:   Ideal body weight (47.7kg) used for calculations as pt >120% of IBW.   Nutrient needs based on [facility] standards of care for maintenance in older adults.   needs adjusted for age, PU stage 2, catabolic illness, vent  1190-1428kcal/day (25-30kcal/kg)  67-76g pro/day (1.4-1.6g/kg)  1428-1666ml fluid/day (30-35ml/kg)    Subjective:   55yo F known to this RD from prior admission with known stage IV endometrial cancer, likely with progression of disease, complex fistulae, chronic indwelling Westbrook admitted with fever and urosepsis. Pt intubated 2/2 acute hypoxic respiratory failure, and successfully extubated on 5/2. Pt transferred to [facility], and started on PO diet. Required reintubation on 5/8 2/2 CO2 narcosis likely d/t respiratory muscle insufficiency/AIDP/GBS. S/p IVIG and s/p LP. Pt extubated on 5/10, though reintubated on 5/13 for CO2 narcosis. Completed 5 treatments of plasmapheresis; last on 5/21. Pt was initially declining trach/PEG and was going to be extubated, though she changed her mind and trach/PEG placed on 5/21. Moved to [facility] on 5/25 for further monitoring. Pt trached to vent now on CPAP attempting to be weaned off, trach collar trial pending as pt with episode of apnea on vent. No further pressor support, VSS. Still with some irritation around PEG, being managed. GOC discussion 5/23, pt made DNR/DNI however wanting to pursue treatment. EN running at goal rate 44ml/hr, tolerating per RN. Will continue to follow per RD protocol.     Previous Nutrition Diagnosis: Increased nutrient needs RT increased demand for kcal/pro AEB wt loss, pressure ulcer, catabolic illness    Active [ x  ]  Resolved [   ]    If resolved, new PES:     Goal: Pt to consistently meet % of estimated needs via tolerated route     Recommendations:  1. Continue with current EN order  2. Monitor for s/s intolerance; maintain aspiration precautions at all times   3. Monitor lytes and replete prn. POC BG Q6hrs   4. Pain and bowel regimens per team     Education: no further ed needs identified, reinforced EN regimen    Risk Level: High [ x  ] Moderate [   ] Low [   ]

## 2019-05-28 NOTE — PROGRESS NOTE ADULT - ASSESSMENT
60 yo F PMHx polio, breast cancer, DM, Endometrial Cancer s/p exploratory laparotomy, enterolysis, SHAMIR, BSO, pelvic lymphadenectomy, low anterior resection mobilization of splenic flexure, end colostomy on 7/30/18, rectovaginal fistula, numerous admissions for urinary tract infection presented to [facility] in the setting of urosepsis. Hospital course complicated by ESBL E coli bacteremia (completed ertapenem on 5/14 ) and respiratory failure requiring multiple intubations 2/2 AIDP, now s/p trach and PEG (5/21), being treated for sepsis 2/2 MRSA PNA w/ linezolid.

## 2019-05-28 NOTE — PROGRESS NOTE ADULT - SUBJECTIVE AND OBJECTIVE BOX
GYN Progress Note    Patient seen at bedside for AM rounds.  Currently on CPAP with some effort, feels she cannot breathe well, unable to clear secretions.  She also notes some discomfort at the site of her tracheostomy.  Pain otherwise well controlled.  Enteric feeds through PEG.      Vital Signs Last 24 Hrs  T(C): 36.8 (28 May 2019 04:56), Max: 37.4 (27 May 2019 08:35)  T(F): 98.3 (28 May 2019 04:56), Max: 99.3 (27 May 2019 08:35)  HR: 70 (28 May 2019 05:04) (66 - 84)  BP: 118/78 (28 May 2019 04:56) (102/69 - 133/84)  BP(mean): --  RR: 14 (28 May 2019 04:56) (13 - 18)  SpO2: 99% (28 May 2019 05:04) (99% - 100%)        I&O's Summary    27 May 2019 07:01  -  28 May 2019 07:00  --------------------------------------------------------  IN: 0 mL / OUT: 1050 mL / NET: -1050 mL      MEDICATIONS  (STANDING):  ascorbic acid 500 milliGRAM(s) Oral daily  chlorhexidine 0.12% Liquid 15 milliLiter(s) Oral Mucosa every 12 hours  dextrose 5%. 1000 milliLiter(s) (50 mL/Hr) IV Continuous <Continuous>  dextrose 50% Injectable 25 Gram(s) IV Push once  docusate sodium Liquid 100 milliGRAM(s) Oral daily  enoxaparin Injectable 40 milliGRAM(s) SubCutaneous every 24 hours  fludroCORTISONE 0.1 milliGRAM(s) Oral every 24 hours  Heparin 1000 Units / ml 2 milliLiter(s) 2 milliLiter(s) IV Push once  insulin glargine Injectable (LANTUS) 8 Unit(s) SubCutaneous at bedtime  insulin regular  human corrective regimen sliding scale   SubCutaneous every 6 hours  lidocaine 2% Gel 1 Application(s) Topical daily  linezolid    Suspension 600 milliGRAM(s) Enteral Tube every 12 hours  midodrine 10 milliGRAM(s) Oral every 8 hours  nystatin Cream 1 Application(s) Topical two times a day  senna 2 Tablet(s) Oral at bedtime  zinc oxide 20% Ointment 1 Application(s) Topical three times a day    MEDICATIONS  (PRN):  acetaminophen    Suspension .. 1000 milliGRAM(s) Oral every 8 hours PRN Moderate Pain (4 - 6)  dextrose 40% Gel 15 Gram(s) Oral once PRN Blood Glucose LESS THAN 70 milliGRAM(s)/deciliter  glucagon  Injectable 1 milliGRAM(s) IntraMuscular once PRN Glucose LESS THAN 70 milligrams/deciliter  lidocaine 2% Gel 1 Application(s) Topical two times a day PRN pain around PEG site  LORazepam     Tablet 0.5 milliGRAM(s) Oral every 8 hours PRN Anxiety  sodium chloride 0.9% lock flush 10 milliLiter(s) IV Push every 1 hour PRN Pre/post blood products, medications, blood draw, and to maintain line patency      LABS:                        7.5    8.81  )-----------( 295      ( 28 May 2019 06:09 )             25.4     05-28    140  |  100  |  15  ----------------------------<  130<H>  3.8   |  31  |  0.39<L>    Ca    9.8      28 May 2019 06:09  Mg     1.9     05-28 GYN Progress Note    Patient seen at bedside for AM rounds.  Currently on CPAP with some effort, feels she cannot breathe well, unable to clear secretions.  She also notes some discomfort at the site of her tracheostomy.  Pain otherwise well controlled.  Was repositioned and cleaned by nursing overnight.  Enteric feeds through PEG.      Vital Signs Last 24 Hrs  T(C): 36.8 (28 May 2019 04:56), Max: 37.4 (27 May 2019 08:35)  T(F): 98.3 (28 May 2019 04:56), Max: 99.3 (27 May 2019 08:35)  HR: 70 (28 May 2019 05:04) (66 - 84)  BP: 118/78 (28 May 2019 04:56) (102/69 - 133/84)  BP(mean): --  RR: 14 (28 May 2019 04:56) (13 - 18)  SpO2: 99% (28 May 2019 05:04) (99% - 100%)    GEN: well appearing, no distress  LUNGS: tolerating CPAP, +secretions, getting suctioned  ABD: soft, nontender, ostomy well perfused, PEG in place  Pelvic: joseph in place, clear urine   EXT: 5/5 strength in upper extremities, 3/5 in lower extremities, no calf tenderness, symmetric lower extremity     I&O's Summary    27 May 2019 07:01  -  28 May 2019 07:00  --------------------------------------------------------  IN: 0 mL / OUT: 1050 mL / NET: -1050 mL      MEDICATIONS  (STANDING):  ascorbic acid 500 milliGRAM(s) Oral daily  chlorhexidine 0.12% Liquid 15 milliLiter(s) Oral Mucosa every 12 hours  dextrose 5%. 1000 milliLiter(s) (50 mL/Hr) IV Continuous <Continuous>  dextrose 50% Injectable 25 Gram(s) IV Push once  docusate sodium Liquid 100 milliGRAM(s) Oral daily  enoxaparin Injectable 40 milliGRAM(s) SubCutaneous every 24 hours  fludroCORTISONE 0.1 milliGRAM(s) Oral every 24 hours  Heparin 1000 Units / ml 2 milliLiter(s) 2 milliLiter(s) IV Push once  insulin glargine Injectable (LANTUS) 8 Unit(s) SubCutaneous at bedtime  insulin regular  human corrective regimen sliding scale   SubCutaneous every 6 hours  lidocaine 2% Gel 1 Application(s) Topical daily  linezolid    Suspension 600 milliGRAM(s) Enteral Tube every 12 hours  midodrine 10 milliGRAM(s) Oral every 8 hours  nystatin Cream 1 Application(s) Topical two times a day  senna 2 Tablet(s) Oral at bedtime  zinc oxide 20% Ointment 1 Application(s) Topical three times a day    MEDICATIONS  (PRN):  acetaminophen    Suspension .. 1000 milliGRAM(s) Oral every 8 hours PRN Moderate Pain (4 - 6)  dextrose 40% Gel 15 Gram(s) Oral once PRN Blood Glucose LESS THAN 70 milliGRAM(s)/deciliter  glucagon  Injectable 1 milliGRAM(s) IntraMuscular once PRN Glucose LESS THAN 70 milligrams/deciliter  lidocaine 2% Gel 1 Application(s) Topical two times a day PRN pain around PEG site  LORazepam     Tablet 0.5 milliGRAM(s) Oral every 8 hours PRN Anxiety  sodium chloride 0.9% lock flush 10 milliLiter(s) IV Push every 1 hour PRN Pre/post blood products, medications, blood draw, and to maintain line patency      LABS:                        7.5    8.81  )-----------( 295      ( 28 May 2019 06:09 )             25.4     05-28    140  |  100  |  15  ----------------------------<  130<H>  3.8   |  31  |  0.39<L>    Ca    9.8      28 May 2019 06:09  Mg     1.9     05-28

## 2019-05-29 ENCOUNTER — TRANSCRIPTION ENCOUNTER (OUTPATIENT)
Age: 59
End: 2019-05-29

## 2019-05-29 DIAGNOSIS — C50.919 MALIGNANT NEOPLASM OF UNSPECIFIED SITE OF UNSPECIFIED FEMALE BREAST: ICD-10-CM

## 2019-05-29 LAB
ANION GAP SERPL CALC-SCNC: 12 MMOL/L — SIGNIFICANT CHANGE UP (ref 5–17)
BLD GP AB SCN SERPL QL: NEGATIVE — SIGNIFICANT CHANGE UP
BUN SERPL-MCNC: 15 MG/DL — SIGNIFICANT CHANGE UP (ref 7–23)
CALCIUM SERPL-MCNC: 10.2 MG/DL — SIGNIFICANT CHANGE UP (ref 8.4–10.5)
CHLORIDE SERPL-SCNC: 99 MMOL/L — SIGNIFICANT CHANGE UP (ref 96–108)
CO2 SERPL-SCNC: 29 MMOL/L — SIGNIFICANT CHANGE UP (ref 22–31)
CREAT SERPL-MCNC: 0.42 MG/DL — LOW (ref 0.5–1.3)
GLUCOSE SERPL-MCNC: 137 MG/DL — HIGH (ref 70–99)
HCT VFR BLD CALC: 26.9 % — LOW (ref 34.5–45)
HGB BLD-MCNC: 8 G/DL — LOW (ref 11.5–15.5)
MAGNESIUM SERPL-MCNC: 1.8 MG/DL — SIGNIFICANT CHANGE UP (ref 1.6–2.6)
MCHC RBC-ENTMCNC: 28.4 PG — SIGNIFICANT CHANGE UP (ref 27–34)
MCHC RBC-ENTMCNC: 29.7 GM/DL — LOW (ref 32–36)
MCV RBC AUTO: 95.4 FL — SIGNIFICANT CHANGE UP (ref 80–100)
NRBC # BLD: 0 /100 WBCS — SIGNIFICANT CHANGE UP (ref 0–0)
PLATELET # BLD AUTO: 309 K/UL — SIGNIFICANT CHANGE UP (ref 150–400)
POTASSIUM SERPL-MCNC: 4.2 MMOL/L — SIGNIFICANT CHANGE UP (ref 3.5–5.3)
POTASSIUM SERPL-SCNC: 4.2 MMOL/L — SIGNIFICANT CHANGE UP (ref 3.5–5.3)
RBC # BLD: 2.82 M/UL — LOW (ref 3.8–5.2)
RBC # FLD: 15.5 % — HIGH (ref 10.3–14.5)
RESPIRATORY PATH PNL SPEC CULT: SIGNIFICANT CHANGE UP
RH IG SCN BLD-IMP: POSITIVE — SIGNIFICANT CHANGE UP
SODIUM SERPL-SCNC: 140 MMOL/L — SIGNIFICANT CHANGE UP (ref 135–145)
SURGICAL PATHOLOGY STUDY: SIGNIFICANT CHANGE UP
WBC # BLD: 8.72 K/UL — SIGNIFICANT CHANGE UP (ref 3.8–10.5)
WBC # FLD AUTO: 8.72 K/UL — SIGNIFICANT CHANGE UP (ref 3.8–10.5)

## 2019-05-29 PROCEDURE — 99233 SBSQ HOSP IP/OBS HIGH 50: CPT | Mod: GC

## 2019-05-29 PROCEDURE — 99233 SBSQ HOSP IP/OBS HIGH 50: CPT

## 2019-05-29 PROCEDURE — 99232 SBSQ HOSP IP/OBS MODERATE 35: CPT

## 2019-05-29 RX ADMIN — LIDOCAINE 1 APPLICATION(S): 4 CREAM TOPICAL at 14:12

## 2019-05-29 RX ADMIN — NYSTATIN CREAM 1 APPLICATION(S): 100000 CREAM TOPICAL at 18:05

## 2019-05-29 RX ADMIN — MIDODRINE HYDROCHLORIDE 10 MILLIGRAM(S): 2.5 TABLET ORAL at 22:51

## 2019-05-29 RX ADMIN — Medication 1000 MILLIGRAM(S): at 06:21

## 2019-05-29 RX ADMIN — NYSTATIN CREAM 1 APPLICATION(S): 100000 CREAM TOPICAL at 05:39

## 2019-05-29 RX ADMIN — CHLORHEXIDINE GLUCONATE 15 MILLILITER(S): 213 SOLUTION TOPICAL at 22:50

## 2019-05-29 RX ADMIN — Medication 1000 MILLIGRAM(S): at 23:40

## 2019-05-29 RX ADMIN — Medication 1000 MILLIGRAM(S): at 23:00

## 2019-05-29 RX ADMIN — Medication 1000 MILLIGRAM(S): at 05:51

## 2019-05-29 RX ADMIN — ZINC OXIDE 1 APPLICATION(S): 200 OINTMENT TOPICAL at 22:51

## 2019-05-29 RX ADMIN — FLUDROCORTISONE ACETATE 0.1 MILLIGRAM(S): 0.1 TABLET ORAL at 05:40

## 2019-05-29 RX ADMIN — ZINC OXIDE 1 APPLICATION(S): 200 OINTMENT TOPICAL at 05:38

## 2019-05-29 RX ADMIN — CHLORHEXIDINE GLUCONATE 15 MILLILITER(S): 213 SOLUTION TOPICAL at 11:23

## 2019-05-29 RX ADMIN — Medication 500 MILLIGRAM(S): at 13:13

## 2019-05-29 RX ADMIN — MIDODRINE HYDROCHLORIDE 10 MILLIGRAM(S): 2.5 TABLET ORAL at 05:40

## 2019-05-29 RX ADMIN — ZINC OXIDE 1 APPLICATION(S): 200 OINTMENT TOPICAL at 13:14

## 2019-05-29 RX ADMIN — LINEZOLID 600 MILLIGRAM(S): 600 INJECTION, SOLUTION INTRAVENOUS at 13:13

## 2019-05-29 RX ADMIN — LINEZOLID 600 MILLIGRAM(S): 600 INJECTION, SOLUTION INTRAVENOUS at 00:31

## 2019-05-29 RX ADMIN — INSULIN HUMAN 2: 100 INJECTION, SOLUTION SUBCUTANEOUS at 00:30

## 2019-05-29 RX ADMIN — INSULIN GLARGINE 8 UNIT(S): 100 INJECTION, SOLUTION SUBCUTANEOUS at 22:50

## 2019-05-29 RX ADMIN — MIDODRINE HYDROCHLORIDE 10 MILLIGRAM(S): 2.5 TABLET ORAL at 13:13

## 2019-05-29 RX ADMIN — ENOXAPARIN SODIUM 40 MILLIGRAM(S): 100 INJECTION SUBCUTANEOUS at 22:50

## 2019-05-29 NOTE — PROGRESS NOTE ADULT - PROBLEM SELECTOR PLAN 9
Continues to have significant setbacks during her hospitalization. Is more hopeful for recovery today and has changed her mind regarding trach.  I am not sure that she will do well in the long run but patient is awake, alert and certainly has capacity to change her mind

## 2019-05-29 NOTE — PROGRESS NOTE ADULT - ASSESSMENT
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  Hospital course notable for prolonged intubation and MICU admission, transitioned to tracheostomy and PEG tube on 5/21, now weened off pressors and transferred to chronic vent unit on [facility].   Primary management by Medicine.    1. Neuro: primary diagnosis of AIDP/GBS, s/p IVIG and plasmapheresis.  Weakness continues to improve however still with persistent respiratory weakness.  Neuro following.  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 Pulm: Tracheostomy (5/21-), tolerating short trials of CPAP, otherwise vent dependent  3. Cardio: On Florinef, midodrine decreased to 10mg TID, s/p levo gtt  4. FEN/GI: PEG 5/21- Repletion of electrolytes prn   5. : Neurogenic bladder w/ Indwelling joseph in place,  When stable, consider urology consult for long term management i.e. for placement suprapubic catheter  6. ID: Afebrile currently. Switched to Linezolid 600mg BID (5/24-). Last urine culture 5/22 reveals VRE and Sputum culture 5/22 and Bronchial wash 5/21 reveals MRSA s/p Vancomycin 1g (5/22-5/24), s/p Meropenem 5/22, s/p ertapenem ended 5/14. F/u ID recs   7. Endocrine: FS, ISS, Lantus 8u qhs (started 5/14)  8. VTE prophylaxis - Upper extremity superficial vein thrombosis noted 5/17, repeat doppler done 5/23-shows stable superficial thrombosis -Lovenox 40mg daily LE Dopplers negative   9. GYN malignancy  - s/p Anastrazole, s/p Megace 80mg BID x3 wks , s/p Tamoxifen 5/1-5/17 (stopped due to upper extremity DVT). Once patient recovers from acute neurologic issue will reassess cancer treatment options   -  Will avoid immunotherapy at this time given associated risk of autoimmune disease   10. Derm: monitor sacrum for s/s of pressure ulcer, now stage 2- continue with zinc oxide   11. Follow up PT/OT recs   12. Social work/palliative: DNR/DNI. Interdisciplinary meeting was done on 5/23

## 2019-05-29 NOTE — DISCHARGE NOTE PROVIDER - NSDCFUADDAPPT_GEN_ALL_CORE_FT
Please follow up with Dr. Sellers of neurology within 1 month of discharge. Please follow up with Dr. Sellers of neurology within 1 month of discharge.  Please follow up with Dr. Nix of urology Please follow up with Dr. Sellers of neurology within 1 month of discharge.  Please follow up with Dr. Nix of urology on June 17th at 1pm. The address is [address] [place] [address]. Please return to [facility] on July 1st, 2019 for your next round of plasmapheresis. You will be admitted under Dr. Sellers. The [facility] should call Dr. Sellers on 6/30 in preparation for your admission. His office phone number is [phone].

## 2019-05-29 NOTE — PROGRESS NOTE ADULT - PROBLEM SELECTOR PLAN 8
Hx of urinary retention/overflow incontinence   - Westbrook changed on 5/17     #PEG placement  - Pt s/p PEG placement 5/21   - Continue w/ Osmolite 1.2     #Cephalic vein thrombosis  - As above  - Was started on AC w/ Lovenox 60mg BID but given the location of thrombus, A/C was dc'd   - Repeat US of LUE 5/23 showed stable thrombus

## 2019-05-29 NOTE — PROGRESS NOTE ADULT - PROBLEM SELECTOR PLAN 5
Resolved. hx of recurrent ESBL ecoli UTI and bacteremia. was initially admitted for septic shock 2/2 to ESBL ecoli bacteremia and UTI  - Completed tx w/ ertapenem on 5/14

## 2019-05-29 NOTE — ADVANCED PRACTICE NURSE CONSULT - RECOMMEDATIONS
Fungal rash and denuded skin on bilateral buttocks, intergluteal cleft and upper posterior thighs - continue nystatin cream as ordered.  Skin beneath trach plate - apply Cavilon liquid skin barrier, then Mepilex Lite dressing for protection every 3 days or prn if soiled or saturated.  Discussed assessment and recommendations with patient, Loraine and house staff, Dr Vincent.

## 2019-05-29 NOTE — ADVANCED PRACTICE NURSE CONSULT - ASSESSMENT
Generalized fungal rash and denuded skin noted to bilateral buttocks, intergluteal cleft and upper posterior thighs improving. Fungal rash being treated with nystatin cream as ordered. Skin beneath trach plate intact. Mepilex Lite dressing in place beneath trach plate for protection. Loraine ALVAREZ present and assisted during assessment.

## 2019-05-29 NOTE — PROGRESS NOTE ADULT - PROBLEM SELECTOR PLAN 5
Per neurology, whether or not PPS is present should not be affecting her ascending/upper extremity weakness and respiratory insufficiency and is better explained by AIDP vs. CIDP  - mgmt otherwise as above in problem #2 and #3  - patient was disappointed that she had not noticed improvement in LE strength from IVIG or plasmapheresis.  Recommend revisit by PT because of foot drop.  She has braces at home- would be better if feet were in a position of function

## 2019-05-29 NOTE — ADVANCED PRACTICE NURSE CONSULT - REASON FOR CONSULT
WOC nurse follow up to re-assess bilateral buttock, intergluteal cleft and upper posterior thighs fungal rash and skin beneath trach plate.

## 2019-05-29 NOTE — PROGRESS NOTE ADULT - ASSESSMENT
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 Today patient appears mildly improved in strength compared to exam yesterday. She was placed on CPAP trial with similar Tv of 250s.     -Given high risk of infection would not recommend plasma exchange as Central Line a source of infection  -Patient had reoccurrence of symptoms within days after last IVIg therapy therefore would not do typical monthly infusions can plan for IVIG 0.5 g/kg q2w starting in 1-2 weeks  -Continue attempts to wean from vent  -Recommend sitting up in a chair position as much as possible  -PT  -Continue supportive care   -Rest of management as per primary team

## 2019-05-29 NOTE — PROGRESS NOTE ADULT - PROBLEM SELECTOR PLAN 6
Originally presented w/ septic shock of  source w/ bacteremia requiring pressor support and intubation -- shock now resolved. Cleared blood cultures on ABx.  - has completed antibiotic course  -but back on antibiotics following recurrence of fever and leukocytosis thoguht to be MRSA in sputum as cause

## 2019-05-29 NOTE — PROGRESS NOTE ADULT - PROBLEM SELECTOR PLAN 7
Stage IV endometrial adenocarcinoma; Per GYN-ONC w/ interval increase in tumor burden. Pt was treated with Anastrazole and initiated 3 weeks of megace 80mg BID followed by 3 weeks of tamoxifen. Pt was on tamoxifen and developed a left cephalic v. thrombus. Tamoxifen was dc'd on 5/17   - Per GYN, no anticipation of continuing chemotherapy    #Hyperglycemia  - Continue Lantus 8 U at bedtime   - SSI and FSG     #Anxiety   - 2/2 ongoing medical issues and prognosis  - Continue w/ Ativan 0.5 mg q6h PRN  - remeron 15mg qhs prn for insomnia  - Psych consulted. Appreciate further recs

## 2019-05-29 NOTE — PROGRESS NOTE ADULT - SUBJECTIVE AND OBJECTIVE BOX
GYN Progress Note    Patient seen at bedside.  Describes some discomfort around her trach neck collar.  Suctioned at bedside this AM.  NPO w/ tube feeds  Westbrook in place  Patient mood stable.    Vital Signs Last 24 Hrs  T(C): 36.9 (29 May 2019 09:16), Max: 37.1 (29 May 2019 05:24)  T(F): 98.5 (29 May 2019 09:16), Max: 98.7 (29 May 2019 05:24)  HR: 74 (29 May 2019 09:16) (74 - 87)  BP: 118/63 (29 May 2019 09:16) (103/61 - 137/84)  BP(mean): --  RR: 19 (29 May 2019 09:16) (16 - 19)  SpO2: 100% (29 May 2019 09:16) (98% - 100%)    Physical Exam:  Gen: No Acute Distress  Pulm: AC/CMV  GI: soft, nontender, nondistended, no rebound, no guarding.  ostomy in place  Ext: b/l UE strength 3/5, b/l LE strength 1/5    I&O's Summary    28 May 2019 07:01  -  29 May 2019 07:00  --------------------------------------------------------  IN: 396 mL / OUT: 1030 mL / NET: -634 mL      MEDICATIONS  (STANDING):  ascorbic acid 500 milliGRAM(s) Oral daily  chlorhexidine 0.12% Liquid 15 milliLiter(s) Oral Mucosa every 12 hours  dextrose 5%. 1000 milliLiter(s) (50 mL/Hr) IV Continuous <Continuous>  dextrose 50% Injectable 25 Gram(s) IV Push once  enoxaparin Injectable 40 milliGRAM(s) SubCutaneous every 24 hours  fludroCORTISONE 0.1 milliGRAM(s) Oral every 24 hours  Heparin 1000 Units / ml 2 milliLiter(s) 2 milliLiter(s) IV Push once  insulin glargine Injectable (LANTUS) 8 Unit(s) SubCutaneous at bedtime  insulin regular  human corrective regimen sliding scale   SubCutaneous every 6 hours  lidocaine 2% Gel 1 Application(s) Topical daily  linezolid    Suspension 600 milliGRAM(s) Enteral Tube every 12 hours  midodrine 10 milliGRAM(s) Oral every 8 hours  nystatin Cream 1 Application(s) Topical two times a day  zinc oxide 20% Ointment 1 Application(s) Topical three times a day    MEDICATIONS  (PRN):  acetaminophen    Suspension .. 1000 milliGRAM(s) Oral every 8 hours PRN Moderate Pain (4 - 6)  dextrose 40% Gel 15 Gram(s) Oral once PRN Blood Glucose LESS THAN 70 milliGRAM(s)/deciliter  glucagon  Injectable 1 milliGRAM(s) IntraMuscular once PRN Glucose LESS THAN 70 milligrams/deciliter  lidocaine 2% Gel 1 Application(s) Topical two times a day PRN pain around PEG site  LORazepam     Tablet 0.5 milliGRAM(s) Oral every 8 hours PRN Anxiety  sodium chloride 0.9% lock flush 10 milliLiter(s) IV Push every 1 hour PRN Pre/post blood products, medications, blood draw, and to maintain line patency      LABS:                        8.0    8.72  )-----------( 309      ( 29 May 2019 07:23 )             26.9     05-29    140  |  99  |  15  ----------------------------<  137<H>  4.2   |  29  |  0.42<L>    Ca    10.2      29 May 2019 07:23  Mg     1.8     05-29

## 2019-05-29 NOTE — PROGRESS NOTE ADULT - PROBLEM SELECTOR PLAN 1
Reintubated, receiving treatment. Trached- Patient hopeful for weaning, although tidal volumes remain low

## 2019-05-29 NOTE — PROGRESS NOTE ADULT - SUBJECTIVE AND OBJECTIVE BOX
TIM MCDANIEL             [id number]    CC:  weakness, GOC    HPI:  60 yo G0 w/ known stage IV endometrial carcinoma s/p staging surgery 7/2018 and 1 cycle of chemotherapy in 2/2019 followed by multiple admissions for management of symptomatic enterovaginal fistula, complex fistula associated urinary tract infection, bacteremia presents on referral from [facility] after fever developing fever.  Found to have severe sepsis, required intubation and treatment of UTI,.  Initially improved but then declined neurologically and required intubation for hypercapnic respiratory failure.  Was extubated and did well for several days. Required reintubation for profound weakness and again, hypercapneic respiratory failure.  Treated with plasmapheresis with improvement, but still unable to be safely weaned.  patient chose to be trached and pegged because she felt she could get better.    SUBJECTIVE:  Today is awake and alert.  FInds trach confining and sometimes feels like she is choking.  no actual pain, no drainage.  Still with low tidal volume of 250 cc.  Able to communicate by writing.  Is aware that she will be going to LTAC to attempt weaning.  Neuro plans IVIG every 2 weeks to see if strength can be maintained.  Suctioning her mouth as needed. Remains without fever, continues on antibiotics for MRSA in sputum    Awaiting to hear from Phillip Kessler  ROS:  DYSPNEA: No  NAUS/VOM: No  SECRETIONS: Yes, some difficulty clearing secretions	  AGITATION: No  Pain (Y/N): No  -Provocation/Palliation:  -Quality/Quantity:  -Radiating:  -Severity:  -Timing/Frequency:  -Impact on ADLs:    OTHER REVIEW OF SYSTEMS: no other complaints      PEx:  Vital Signs Last 24 Hrs  T(C): 36.9 (29 May 2019 09:16), Max: 37.1 (29 May 2019 05:24)  T(F): 98.5 (29 May 2019 09:16), Max: 98.7 (29 May 2019 05:24)  HR: 74 (29 May 2019 09:16) (74 - 87)  BP: 118/63 (29 May 2019 09:16) (103/61 - 137/84)  BP(mean): --  RR: 19 (29 May 2019 09:16) (16 - 19)  SpO2: 100% (29 May 2019 09:16) (98% - 100%)    General: frail female, chronically ill appearing  comfortable in bed ; appearing older than stated age, pale, engaged in conversation and writing on a pad without difficulty  HEENT: moderate alopecia; conjunctival pallor;  NG tube removed, smiles often.  Neck: supple, but appears stronger with more head control.  Able to shake head vigorously "yes" and "no" trach in place  CVS: S1/S2, RRR  Resp: on ventilator  GI: soft, non-tender PEG  Musc: hypotrophic muscularity and bulk, particularly of UE/hands, LE; profound kyphoscoliosis muscle wasting of left trapezius and latissimus dorsi  Neuro: awake and alert; shrugs shoulders moderate neck strength, squeezes my hand  Psych: overall in better spirits  Skin: intact	     ALLERGIES: IVIG PRODUCT IS PRIGIVEN OR GAMMUNEX (Unknown)  No Known Allergies    OPIATE NAÏVE (Y/N): Yes on presentation, had received opiates earlier in admission    MEDICATIONS: REVIEWED  MEDICATIONS  (STANDING):  ascorbic acid 500 milliGRAM(s) Oral daily  chlorhexidine 0.12% Liquid 15 milliLiter(s) Oral Mucosa every 12 hours  dextrose 5%. 1000 milliLiter(s) (50 mL/Hr) IV Continuous <Continuous>  dextrose 50% Injectable 25 Gram(s) IV Push once  enoxaparin Injectable 40 milliGRAM(s) SubCutaneous every 24 hours  fludroCORTISONE 0.1 milliGRAM(s) Oral every 24 hours  Heparin 1000 Units / ml 2 milliLiter(s) 2 milliLiter(s) IV Push once  insulin glargine Injectable (LANTUS) 8 Unit(s) SubCutaneous at bedtime  insulin regular  human corrective regimen sliding scale   SubCutaneous every 6 hours  lidocaine 2% Gel 1 Application(s) Topical daily  linezolid    Suspension 600 milliGRAM(s) Enteral Tube every 12 hours  midodrine 10 milliGRAM(s) Oral every 8 hours  nystatin Cream 1 Application(s) Topical two times a day  zinc oxide 20% Ointment 1 Application(s) Topical three times a day    MEDICATIONS  (PRN):  acetaminophen    Suspension .. 1000 milliGRAM(s) Oral every 8 hours PRN Moderate Pain (4 - 6)  dextrose 40% Gel 15 Gram(s) Oral once PRN Blood Glucose LESS THAN 70 milliGRAM(s)/deciliter  glucagon  Injectable 1 milliGRAM(s) IntraMuscular once PRN Glucose LESS THAN 70 milligrams/deciliter  lidocaine 2% Gel 1 Application(s) Topical two times a day PRN pain around PEG site  LORazepam     Tablet 0.5 milliGRAM(s) Oral every 8 hours PRN Anxiety  sodium chloride 0.9% lock flush 10 milliLiter(s) IV Push every 1 hour PRN Pre/post blood products, medications, blood draw, and to maintain line patency          LABS: REVIEWED                                                                            8.0    8.72  )-----------( 309      ( 29 May 2019 07:23 )             26.9   05-29    140  |  99  |  15  ----------------------------<  137<H>  4.2   |  29  |  0.42<L>    Ca    10.2      29 May 2019 07:23  Mg     1.8     05-29                          IMAGING: REVIEWED    < from: Xray Chest 1 View- PORTABLE-Routine (05.24.19 @ 04:45) >  EXAM:  XR CHEST PORTABLE ROUTINE 1V                          PROCEDURE DATE:  05/24/2019          INTERPRETATION:  XR CHEST dated 5/24/2019 4:45 AM    CLINICAL INFORMATION: Trach    COMPARISON: May 23, 2019    FINDINGS: Support devices are unchanged. New right arm PICC line in   place. Heart size is stable. No focal consolidation or pleural effusion.   No pneumothorax.    IMPRESSION: New right arm PICC line in place. Otherwise, no significant   change.    < end of copied text >    ADVANCED DIRECTIVES:     see note above, DNR, trach, PEG    DECISION MAKER: patient/self  LEGAL SURROGATE: Esha Avila (sister) [phone]    PSYCHOSOCIAL-SPIRITUAL ASSESSMENT:       Reviewed       Care plan as below	    GOALS OF CARE DISCUSSION       Palliative care info/counseling provided previously.  Awaiting arrival of Esha roblero	           Family meeting       Advanced Directives addressed please see Advance Care Planning Note	           See previous Palliative Medicine Notes       Documentation of GOC: Agrees with efforts to treat,     agrees to trach and PEG  	      AGENCY CHOICE DISCUSSED:     [facility] for weaning    REFERRALS	        Palliative Med        Unit SW/Case Mgmt        - declined       Speech/Swallow - failed dysphagia; has NGT currently       Patient/Family Support       Massage Therapy       Music Therapy       Hospice - not applicable at present, still hopeful to be treated       Nutrition       PT/OT

## 2019-05-29 NOTE — PROGRESS NOTE ADULT - PROBLEM SELECTOR PLAN 3
responded to plasmapheresis with increased strength in upper body  - prognosis overall remains guarded given her multiple chronic comorbidities, the prospect of cytotoxic chemotherapy initiation for her malignancy per GYN, and now with the third need for intubation  Change in plans regarding advance directives.  See above.  I think with her increased strength she wants to try more aggressive approach  Trached and with PEG.  Hopeful for a miracle.  Has a chance at weaning- Question will be can she maintain herself?

## 2019-05-29 NOTE — PROGRESS NOTE ADULT - SUBJECTIVE AND OBJECTIVE BOX
O/N Events:  Subjective/ROS: Denies HA, CP, SOB, n/v, changes in bowel/urinary habits.  12pt ROS otherwise negative.    VITALS  Vital Signs Last 24 Hrs  T(C): 37.1 (29 May 2019 05:24), Max: 37.6 (28 May 2019 09:45)  T(F): 98.7 (29 May 2019 05:24), Max: 99.7 (28 May 2019 09:45)  HR: 76 (29 May 2019 05:24) (75 - 87)  BP: 103/61 (29 May 2019 05:24) (103/61 - 137/84)  BP(mean): --  RR: 16 (29 May 2019 05:24) (16 - 19)  SpO2: 99% (29 May 2019 06:44) (98% - 100%)    CAPILLARY BLOOD GLUCOSE      POCT Blood Glucose.: 122 mg/dL (29 May 2019 06:07)  POCT Blood Glucose.: 165 mg/dL (28 May 2019 23:51)  POCT Blood Glucose.: 148 mg/dL (28 May 2019 22:25)  POCT Blood Glucose.: 122 mg/dL (28 May 2019 17:56)  POCT Blood Glucose.: 145 mg/dL (28 May 2019 12:11)      PHYSICAL EXAM  Gen: Sleeping, lying comfortably in bed, laughing appropriately to jokes  CV: RRR, S1/S2  Resp: CTABL  Neurologic:  - Mental Status:  AAOx3; speech is limited from trach but appears fluent with intact naming, repetition, and comprehension  - Cranial Nerves II-XII:    II:  PERRLA; visual fields are full to confrontation  III, IV, VI:  EOMI, no nystagmus  V:  facial sensation is intact in the V1-V3 distribution bilaterally.  VII:  face is symmetric with residual weakness on eye closure and smile  VIII:  hearing is intact to finger rub  IX, X:  uvula is midline and soft palate rises symmetrically  XI:  head turning and shoulder shrug are intact bilaterally  XII:  tongue protrudes in the midline  - Motor:  strength is 4-5/5 throughout upper extremity; Hip Flexion 2/5, Knee Extension and Flexion 1-2/5, Ankle 1-2/5;  - Reflexes: A reflexic   - Gait:  deferred      MEDICATIONS  (STANDING):  ascorbic acid 500 milliGRAM(s) Oral daily  chlorhexidine 0.12% Liquid 15 milliLiter(s) Oral Mucosa every 12 hours  dextrose 5%. 1000 milliLiter(s) (50 mL/Hr) IV Continuous <Continuous>  dextrose 50% Injectable 25 Gram(s) IV Push once  enoxaparin Injectable 40 milliGRAM(s) SubCutaneous every 24 hours  fludroCORTISONE 0.1 milliGRAM(s) Oral every 24 hours  Heparin 1000 Units / ml 2 milliLiter(s) 2 milliLiter(s) IV Push once  insulin glargine Injectable (LANTUS) 8 Unit(s) SubCutaneous at bedtime  insulin regular  human corrective regimen sliding scale   SubCutaneous every 6 hours  lidocaine 2% Gel 1 Application(s) Topical daily  linezolid    Suspension 600 milliGRAM(s) Enteral Tube every 12 hours  midodrine 10 milliGRAM(s) Oral every 8 hours  nystatin Cream 1 Application(s) Topical two times a day  zinc oxide 20% Ointment 1 Application(s) Topical three times a day    MEDICATIONS  (PRN):  acetaminophen    Suspension .. 1000 milliGRAM(s) Oral every 8 hours PRN Moderate Pain (4 - 6)  dextrose 40% Gel 15 Gram(s) Oral once PRN Blood Glucose LESS THAN 70 milliGRAM(s)/deciliter  glucagon  Injectable 1 milliGRAM(s) IntraMuscular once PRN Glucose LESS THAN 70 milligrams/deciliter  lidocaine 2% Gel 1 Application(s) Topical two times a day PRN pain around PEG site  LORazepam     Tablet 0.5 milliGRAM(s) Oral every 8 hours PRN Anxiety  sodium chloride 0.9% lock flush 10 milliLiter(s) IV Push every 1 hour PRN Pre/post blood products, medications, blood draw, and to maintain line patency      IVIG PRODUCT IS PRIGIVEN OR GAMMUNEX (Unknown)  No Known Allergies      LABS                        8.0    8.72  )-----------( 309      ( 29 May 2019 07:23 )             26.9     05-28    140  |  100  |  15  ----------------------------<  130<H>  3.8   |  31  |  0.39<L>    Ca    9.8      28 May 2019 06:09  Mg     1.9     05-28

## 2019-05-29 NOTE — DISCHARGE NOTE PROVIDER - NSDCCPCAREPLAN_GEN_ALL_CORE_FT
PRINCIPAL DISCHARGE DIAGNOSIS  Diagnosis: Sepsis, due to unspecified organism  Assessment and Plan of Treatment: You were initially admitted due to a urinary tract infection that spread to your blood. You were initially intubated in the medical ICU because your breathing was compromised from the infection. You were treated with an extended course of IV antibiotics and your infection improved. You have a joseph catheter because of urinary retention. You should follow up with urology who will consider placing a suprapubic catheter.      SECONDARY DISCHARGE DIAGNOSES  Diagnosis: AIDP (acute inflammatory demyelinating polyneuropathy)  Assessment and Plan of Treatment: During your admission, you had worsening upper and lower extremity weakness, as well as respiratory failure. this was due to an autoimmune condition called AIDP. You were intubated multiple times for respiratory failure and ultimately a tracheostomy was placed so that you could breath on the ventilator for long term. You were treated with a medication called IVIG and you also had a procedure called plasmapheresis. Going forward, you will have IVIG every 2 weeks. At the long term care facility, they will try to wean you off the ventilator so that hopefully you will not require it in the future.  For LTAC: IVIG 0.5gm/kg every 2 weeks starting on 6/3/19. Can only use PRIVAGEN OR GAMMUNEX as patient has had hemolytic anemia in response to other formulations of IVIG.    Diagnosis: Type 2 diabetes mellitus without complication, without long-term current use of insulin  Assessment and Plan of Treatment:     Diagnosis: Endometrial adenocarcinoma  Assessment and Plan of Treatment:     Diagnosis: Autonomic instability  Assessment and Plan of Treatment: Due to your history of polio and AIDP, you have autonomitc dysfunction which leads to low blood pressure. You should continue medications called midodrine and florinef to help keep up your blood pressure.    Diagnosis: Pneumonia due to Staphylococcus aureus  Assessment and Plan of Treatment: Pneumonia due to Staphylococcus aureus PRINCIPAL DISCHARGE DIAGNOSIS  Diagnosis: Sepsis, due to unspecified organism  Assessment and Plan of Treatment: You were initially admitted due to a urinary tract infection that spread to your blood. You were initially intubated in the medical ICU because your breathing was compromised from the infection. You were treated with an extended course of IV antibiotics and your infection improved. You have a joseph catheter because of urinary retention. You should follow up with urology who will consider placing a suprapubic catheter.      SECONDARY DISCHARGE DIAGNOSES  Diagnosis: AIDP (acute inflammatory demyelinating polyneuropathy)  Assessment and Plan of Treatment: During your admission, you had worsening upper and lower extremity weakness, as well as respiratory failure. this was due to an autoimmune condition called AIDP. You were intubated multiple times for respiratory failure and ultimately a tracheostomy was placed so that you could breath on the ventilator for long term. You were treated with a medication called IVIG and you also had a procedure called plasmapheresis. Going forward, you will have IVIG every 2 weeks. At the long term care facility, they will try to wean you off the ventilator so that hopefully you will not require it in the future.  For LTAC: IVIG 0.5gm/kg x1 dose every 2 weeks starting on 6/3/19. Can only use PRIVAGEN OR GAMMUNEX as patient has had hemolytic anemia in response to other formulations of IVIG (e.g. gammaguard).    Diagnosis: Type 2 diabetes mellitus without complication, without long-term current use of insulin  Assessment and Plan of Treatment: Please continue lantus 8 units at bedtime and insulin sliding scale at other times.    Diagnosis: Endometrial adenocarcinoma  Assessment and Plan of Treatment: Gynecology oncology has been following you before and during this hospitalization. At this point, they do not plan on doing further treatment until the AIDP and respiratory issues are better under control. Please follow up with them as an outpatient.    Diagnosis: Autonomic instability  Assessment and Plan of Treatment: Due to your history of polio and AIDP, you have autonomitc dysfunction which leads to low blood pressure. You should continue medications called midodrine and florinef to help keep up your blood pressure.    Diagnosis: Pneumonia due to Staphylococcus aureus  Assessment and Plan of Treatment: You developed pneumonia during your stay for which you were treated with IV and then oral antibiotics. This is now resolved. PRINCIPAL DISCHARGE DIAGNOSIS  Diagnosis: Sepsis, due to unspecified organism  Assessment and Plan of Treatment: You were initially admitted due to a urinary tract infection that spread to your blood. You were initially intubated in the medical ICU because your breathing was compromised from the infection. You were treated with an extended course of IV antibiotics and your infection improved. You have a joseph catheter because of urinary retention. You should keep the joseph catheter in place for now. You should follow up with urology (Dr. Ponce) who will consider placing a suprapubic catheter.      SECONDARY DISCHARGE DIAGNOSES  Diagnosis: AIDP (acute inflammatory demyelinating polyneuropathy)  Assessment and Plan of Treatment: During your admission, you had worsening upper and lower extremity weakness, as well as respiratory failure. this was due to an autoimmune condition called AIDP. You were intubated multiple times for respiratory failure and ultimately a tracheostomy was placed so that you could breath on the ventilator for long term. You were treated with a medication called IVIG and you also had a procedure called plasmapheresis. Going forward, you will have IVIG every 2 weeks. At the long term care facility, they will try to wean you off the ventilator so that hopefully you will not require it in the future.  For LTAC: IVIG 0.5gm/kg x1 dose every 2 weeks starting on 6/3/19. Can only use PRIVAGEN OR GAMMUNEX as patient has had hemolytic anemia in response to other formulations of IVIG (e.g. gammaguard).    Diagnosis: Type 2 diabetes mellitus without complication, without long-term current use of insulin  Assessment and Plan of Treatment: Please continue lantus 8 units at bedtime and insulin sliding scale at other times.    Diagnosis: Endometrial adenocarcinoma  Assessment and Plan of Treatment: Gynecology oncology has been following you before and during this hospitalization. At this point, they do not plan on doing further treatment until the AIDP and respiratory issues are better under control. Please follow up with them as an outpatient.    Diagnosis: Autonomic instability  Assessment and Plan of Treatment: Due to your history of polio and AIDP, you have autonomitc dysfunction which leads to low blood pressure. You should continue medications called midodrine and florinef to help keep up your blood pressure.    Diagnosis: Pneumonia due to Staphylococcus aureus  Assessment and Plan of Treatment: You developed pneumonia during your stay for which you were treated with IV and then oral antibiotics. This is now resolved. PRINCIPAL DISCHARGE DIAGNOSIS  Diagnosis: Sepsis, due to unspecified organism  Assessment and Plan of Treatment: You were initially admitted due to a urinary tract infection that spread to your blood. You were initially intubated in the medical ICU because your breathing was compromised from the infection. You were treated with an extended course of IV antibiotics and your infection improved. You have a joseph catheter because of urinary retention. You should keep the joseph catheter in place for now. Urology (Dr. Ponce) evaluated you regarding placement a suprapubic catheter, however you are tolerating the ojseph catheter for now. If this becomes an issue in the future then a suprapubic catheter might be an option.      SECONDARY DISCHARGE DIAGNOSES  Diagnosis: Enterovaginal fistula  Assessment and Plan of Treatment: You have a history of entero vaginal fistula and this re-opened during your stay. You were started on TPN. At the LTAC you should continue with the TPN.    Diagnosis: AIDP (acute inflammatory demyelinating polyneuropathy)  Assessment and Plan of Treatment: During your admission, you had worsening upper and lower extremity weakness, as well as respiratory failure. this was due to an autoimmune condition called AIDP. You were intubated multiple times for respiratory failure and ultimately a tracheostomy was placed so that you could breath on the ventilator for long term. You were treated with a medication called IVIG and you also had a procedure called plasmapheresis. Going forward, you will have plasmapheresis once monthly for two rounds. You will be re-admitted from the LTAC for each of these sessions. At the long term care facility, they will try to wean you off the ventilator so that hopefully you will not require it in the future.  For LTAC: Patient to return on 6/30/19 at noon. Attending at LT should call Dr. Sellers one day prior to direct admission for signout.    Diagnosis: Endometrial adenocarcinoma  Assessment and Plan of Treatment: Gynecology oncology has been following you before and during this hospitalization. At this point, they do not plan on doing further treatment until the AIDP and respiratory issues are better under control. They will follow up with you when you are re admitted.    Diagnosis: Anemia, chronic disease  Assessment and Plan of Treatment: You had anemia during admission, for which you were given multiple blood transfusions. This is due to anemia of chronic disease. Periodically your labs should be monitored.    Diagnosis: Autonomic instability  Assessment and Plan of Treatment: Due to your history of polio and AIDP, you have autonomitc dysfunction which leads to low blood pressure. You should continue medications called midodrine and florinef to help keep up your blood pressure.    Diagnosis: Pneumonia due to Staphylococcus aureus  Assessment and Plan of Treatment: You developed pneumonia during your stay for which you were treated with IV and then oral antibiotics. This is now resolved. PRINCIPAL DISCHARGE DIAGNOSIS  Diagnosis: Sepsis, due to unspecified organism  Assessment and Plan of Treatment: You were initially admitted due to a urinary tract infection that spread to your blood. You were initially intubated in the medical ICU because your breathing was compromised from the infection. You were treated with an extended course of IV antibiotics and your infection improved. You have a joseph catheter because of urinary retention. You should keep the joseph catheter in place for now. Urology (Dr. Ponce) evaluated you regarding placement a suprapubic catheter, however you are tolerating the joseph catheter for now. If this becomes an issue in the future then a suprapubic catheter might be an option.      SECONDARY DISCHARGE DIAGNOSES  Diagnosis: Enterovaginal fistula  Assessment and Plan of Treatment: You have a history of entero vaginal fistula and this re-opened during your stay. You were started on TPN. At the LTAC you should continue with the TPN.    Diagnosis: AIDP (acute inflammatory demyelinating polyneuropathy)  Assessment and Plan of Treatment: During your admission, you had worsening upper and lower extremity weakness, as well as respiratory failure. this was due to an autoimmune condition called AIDP. You were intubated multiple times for respiratory failure and ultimately a tracheostomy was placed so that you could breath on the ventilator for long term. You were treated with a medication called IVIG and you also had a procedure called plasmapheresis. Going forward, you will have plasmapheresis once monthly for two rounds. You will be re-admitted from the LTAC for each of these sessions. At the long term care facility, they will try to wean you off the ventilator so that hopefully you will not require it in the future.  For LTAC:  Per neurology, patient will require 2 rounds of plasmapheresis qMonthly for 3 months. Then neurology will re-evaluate need for more treatment. Plan for patient to return to [facility] on June 30th at noon for next round of plasmapheresis. LTAC physician should call to give signout to Dr. Sellers (admitting attending) on day prior to admission. Dr. Sellers can be reached at [phone].    Diagnosis: Endometrial adenocarcinoma  Assessment and Plan of Treatment: Gynecology oncology has been following you before and during this hospitalization. At this point, they do not plan on doing further treatment until the AIDP and respiratory issues are better under control. They will follow up with you when you are re admitted.    Diagnosis: Anemia, chronic disease  Assessment and Plan of Treatment: You had anemia during admission, for which you were given multiple blood transfusions. This is due to anemia of chronic disease. Periodically your labs should be monitored.    Diagnosis: Autonomic instability  Assessment and Plan of Treatment: Due to your history of polio and AIDP, you have autonomitc dysfunction which leads to low blood pressure. You should continue medications called midodrine and florinef to help keep up your blood pressure.    Diagnosis: Pneumonia due to Staphylococcus aureus  Assessment and Plan of Treatment: You developed pneumonia during your stay for which you were treated with IV and then oral antibiotics. This is now resolved. PRINCIPAL DISCHARGE DIAGNOSIS  Diagnosis: Sepsis, due to unspecified organism  Assessment and Plan of Treatment: You were initially admitted due to a urinary tract infection that spread to your blood. You were initially intubated in the medical ICU because your breathing was compromised from the infection. You were treated with an extended course of IV antibiotics and your infection improved. You have a joseph catheter because of urinary retention. You should keep the joseph catheter in place for now. Urology (Dr. Ponce) evaluated you regarding placement a suprapubic catheter, however you are tolerating the joseph catheter for now. If this becomes an issue in the future then a suprapubic catheter might be an option.      SECONDARY DISCHARGE DIAGNOSES  Diagnosis: Anxiety  Assessment and Plan of Treatment: Occsionally you become anxious which makes it difficult for you to breath. You can take remeron and/or ativan as needed for this.    Diagnosis: Enterovaginal fistula  Assessment and Plan of Treatment: You have a history of entero vaginal fistula and this re-opened during your stay. You were started on TPN. At the [facility] you should continue with the TPN.    Diagnosis: AIDP (acute inflammatory demyelinating polyneuropathy)  Assessment and Plan of Treatment: During your admission, you had worsening upper and lower extremity weakness, as well as respiratory failure. this was due to an autoimmune condition called AIDP. You were intubated multiple times for respiratory failure and ultimately a tracheostomy was placed so that you could breath on the ventilator for long term. You were treated with a medication called IVIG and you also had a procedure called plasmapheresis. Going forward, you will have plasmapheresis once monthly for two rounds. You will be re-admitted from the [facility] for each of these sessions. At this facility, they will try to wean you off the ventilator so that hopefully you will not require it in the future. You should be on CPAP during the day and ACVC at night to begin.  For [facility]:  Per neurology, patient will require 2 rounds of plasmapheresis qMonthly for 3 months. Then neurology will re-evaluate need for more treatment. Plan for patient to return to [facility] on July 1st at noon for next round of plasmapheresis. [facility] should call to give signout to Dr. Selelrs (admitting attending) on day prior to admission. Dr. Sellers can be reached at [phone].    Diagnosis: Endometrial adenocarcinoma  Assessment and Plan of Treatment: Gynecology oncology has been following you before and during this hospitalization. At this point, they do not plan on doing further treatment until the AIDP and respiratory issues are better under control. They will follow up with you when you are re admitted.    Diagnosis: Anemia, chronic disease  Assessment and Plan of Treatment: You had anemia during admission, for which you were given multiple blood transfusions. This is due to anemia of chronic disease. Periodically your labs should be monitored.    Diagnosis: Autonomic instability  Assessment and Plan of Treatment: Due to your history of polio and AIDP, you have autonomitc dysfunction which leads to low blood pressure. You should continue medications called midodrine and florinef to help keep up your blood pressure.    Diagnosis: Pneumonia due to Staphylococcus aureus  Assessment and Plan of Treatment: You developed pneumonia during your stay for which you were treated with IV and then oral antibiotics. This is now resolved.

## 2019-05-29 NOTE — PROGRESS NOTE ADULT - PROBLEM SELECTOR PLAN 2
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 Initial concern for adrenal insufficiency, stim test negative.  - Continue florinef 0.1mg qd   - C/w midodrine 10mg TID

## 2019-05-29 NOTE — PROGRESS NOTE ADULT - ASSESSMENT
53yo F w/ known stage IV endometrial cancer, likely with progression of disease, complex fistulae, chronic indwelling [place] admitted with fever and urosepsis originally requiring MICU admission for septic shock and respiratory failure which improved; however course c/b progressive ascending weakness while on medical telemetry/SDU despite initiation of IVIG, which was further c/b acute hypercapnic respiratory failure requiring reintubation and readmission to MICU for further management. Had shown some improvement, but 5/13 again with hypercarbic respiratory failure requiring intubation.  Has completed plasmapheresis with  improvement. Palliative following for symptom management, patient/family support, and goals of care.

## 2019-05-29 NOTE — DISCHARGE NOTE PROVIDER - PROVIDER TOKENS
PROVIDER:[TOKEN:[[id number]],FOLLOWUP:[1 month]] PROVIDER:[TOKEN:[[id number]],FOLLOWUP:[1 month]],PROVIDER:[TOKEN:[[id number]]]

## 2019-05-29 NOTE — DISCHARGE NOTE PROVIDER - CARE PROVIDERS DIRECT ADDRESSES
,[email or web address]

## 2019-05-29 NOTE — PROGRESS NOTE ADULT - SUBJECTIVE AND OBJECTIVE BOX
OVERNIGHT EVENTS: KESHAWN, on VC AC overnight    SUBJECTIVE: Patient feels that her trach collar was a bit tight this morning and was complaining of R upper back pain that felt better w gentle massage. Wanted to try to breath on CPAP, however quickly failed trial per neurology resident.    Vital Signs Last 12 Hrs  T(F): 98.7 (05-29-19 @ 05:24), Max: 98.7 (05-29-19 @ 05:24)  HR: 76 (05-29-19 @ 05:24) (76 - 87)  BP: 103/61 (05-29-19 @ 05:24) (103/61 - 134/72)  BP(mean): --  RR: 16 (05-29-19 @ 05:24) (16 - 18)  SpO2: 99% (05-29-19 @ 06:44) (98% - 100%)  I&O's Summary    28 May 2019 07:01  -  29 May 2019 07:00  --------------------------------------------------------  IN: 396 mL / OUT: 1030 mL / NET: -634 mL        PHYSICAL EXAM:  Constitutional: NAD, comfortable in bed, on ventilator  HEENT: NC/AT, PERRLA, EOMI, no conjunctival pallor or scleral icterus, MMM  Neck: Supple, no JVD, tracheostomy site CDI  Respiratory: CTAB. No w/r/r. on AC/VC  Cardiovascular: RRR, normal S1 and S2, no m/r/g.   Gastrointestinal: +BS, soft NTND, no guarding or rebound tenderness, no palpable masses, +PEG tube site CDI, L sided colostomy bag w gas  Extremities: wwp; no cyanosis, clubbing or edema.   Vascular: Pulses equal and strong throughout.   Neurological: AAOx3, no CN deficits, strength and sensation intact in upper extremities including  strength, LE strength 3/5  Skin: No gross skin abnormalities or rashes        LABS:                        8.0    8.72  )-----------( 309      ( 29 May 2019 07:23 )             26.9     05-29    140  |  99  |  15  ----------------------------<  137<H>  4.2   |  29  |  0.42<L>    Ca    10.2      29 May 2019 07:23  Mg     1.8     05-29            RADIOLOGY & ADDITIONAL TESTS:    MEDICATIONS  (STANDING):  ascorbic acid 500 milliGRAM(s) Oral daily  chlorhexidine 0.12% Liquid 15 milliLiter(s) Oral Mucosa every 12 hours  dextrose 5%. 1000 milliLiter(s) (50 mL/Hr) IV Continuous <Continuous>  dextrose 50% Injectable 25 Gram(s) IV Push once  enoxaparin Injectable 40 milliGRAM(s) SubCutaneous every 24 hours  fludroCORTISONE 0.1 milliGRAM(s) Oral every 24 hours  Heparin 1000 Units / ml 2 milliLiter(s) 2 milliLiter(s) IV Push once  insulin glargine Injectable (LANTUS) 8 Unit(s) SubCutaneous at bedtime  insulin regular  human corrective regimen sliding scale   SubCutaneous every 6 hours  lidocaine 2% Gel 1 Application(s) Topical daily  linezolid    Suspension 600 milliGRAM(s) Enteral Tube every 12 hours  midodrine 10 milliGRAM(s) Oral every 8 hours  nystatin Cream 1 Application(s) Topical two times a day  zinc oxide 20% Ointment 1 Application(s) Topical three times a day    MEDICATIONS  (PRN):  acetaminophen    Suspension .. 1000 milliGRAM(s) Oral every 8 hours PRN Moderate Pain (4 - 6)  dextrose 40% Gel 15 Gram(s) Oral once PRN Blood Glucose LESS THAN 70 milliGRAM(s)/deciliter  glucagon  Injectable 1 milliGRAM(s) IntraMuscular once PRN Glucose LESS THAN 70 milligrams/deciliter  lidocaine 2% Gel 1 Application(s) Topical two times a day PRN pain around PEG site  LORazepam     Tablet 0.5 milliGRAM(s) Oral every 8 hours PRN Anxiety  sodium chloride 0.9% lock flush 10 milliLiter(s) IV Push every 1 hour PRN Pre/post blood products, medications, blood draw, and to maintain line patency

## 2019-05-29 NOTE — DISCHARGE NOTE PROVIDER - CARE PROVIDER_API CALL
Jose Maria Sellers)  Neurology; Neuromuscular Medicine  [address], [address] [place] [address]  Phone: [phone]  Fax: [phone]  Follow Up Time: 1 month Jose Maria Sellers)  Neurology; Neuromuscular Medicine  [address], [address] [place] [id number]  Phone: [phone]  Fax: [phone]  Follow Up Time: 1 month    Denny Nix)  Urology  [address], [facility] [place] [address]  Phone: [phone]  Fax: [phone]  Follow Up Time: Jose Maria Sellers)  Neurology; Neuromuscular Medicine  [address], [address] [place] [id number]  Phone: [phone]  Fax: [phone]  Follow Up Time: 1 month    Denny Nix)  Urology  [address], [facility] [place] [address]  Phone: [phone]  Fax: [phone]  Follow Up Time:

## 2019-05-29 NOTE — PROGRESS NOTE ADULT - ATTENDING COMMENTS
Patient was seen and examined with the resident team today.  I agree with Dr. Vincent's assessment and plan with the following exceptions/additions:     This is a 58yo woman with a PMH of polio c/b post-polio syndrome, breast cancer, endometrial cancer s/p explap, enterolysis, SHAMIR, BSO, pelvic lymphadenectomy, low anterior resection mobilization of splenic flexure and end colostomy (7/30/18), rectovaginal fistula, as well as, numerous admissions for urinary tract infections who was initially admitted for ESBL E. coli bacteremia c/b septic shock, now s/p a protracted hospital course.  Admission has been complicated by acute (now chronic) hypercapnic respiratory failure requiring multiple intubations 2/2 AIDP (s/p IVIG and plasmapheresis), now s/p trach and PEG ( 5/21) and sepsis 2/2 MRSA HAP/VAP and VRE UTI.      NAEO.  On AC/VC overnighta and pressure support this AM.  SW still awaiting application review at [employer].     Exam - NCAT, MMM, clear OP, +trach at midline w/crusted dressing but no discharge, rhonchi anteriorly, RRR no m/g/r, +ostomy with pink stoma, +PEG with TFs, RUE +PICC, WWP, BLE diffuse muscle atrophy, III/V BLE motor with hip flexion but I-II/V w/foot flexion, AOx3.     -- c/w pressure support as tolerated but would ensure back on AC/VC overnight  -- c/w Linezolid until 5/30  -- c/w TFs  -- daily PT and OT if able  -- c/w Midodrine and Florinef and wean as able  -- IVIG [id number]   -- c/w insulin for FS goal <160  -- Neuro following, appreciate assistance  -- Gyn following, appreciate assistance   -- DVT PPx - Lovenox  -- Dispo - LTAC, Pall care assisting    Maya Christian  [phone]

## 2019-05-29 NOTE — PROGRESS NOTE ADULT - ASSESSMENT
60 yo F PMHx polio, breast cancer, DM, Endometrial Cancer s/p exploratory laparotomy, enterolysis, SHAMIR, BSO, pelvic lymphadenectomy, low anterior resection mobilization of splenic flexure, end colostomy on 7/30/18, rectovaginal fistula, numerous admissions for urinary tract infection presented to [facility] in the setting of urosepsis. Hospital course complicated by ESBL E coli bacteremia (completed ertapenem on 5/14 ) and respiratory failure requiring multiple intubations 2/2 AIDP, now s/p trach and PEG (5/21), being treated for sepsis 2/2 MRSA PNA w/ linezolid.

## 2019-05-29 NOTE — PROGRESS NOTE ADULT - PROBLEM SELECTOR PLAN 1
2/2 to demyelinating polyneuropathy (AIDP), patient has required 3 intubations this admission, now s/p trach (5/21) and s/p 4 doses of IVIG finished 5/11 and 5 rounds of plasmapheresis ( finished 5/21)  - C/w mechanical ventilation  - continue to attempt to wean patient from ventilator. however failed cpap this am  - C/w frequent suctioning

## 2019-05-30 PROBLEM — L98.8 OTHER SPECIFIED DISORDERS OF THE SKIN AND SUBCUTANEOUS TISSUE: Chronic | Status: ACTIVE | Noted: 2019-04-29

## 2019-05-30 PROBLEM — C50.919 MALIGNANT NEOPLASM OF UNSPECIFIED SITE OF UNSPECIFIED FEMALE BREAST: Chronic | Status: ACTIVE | Noted: 2019-04-29

## 2019-05-30 PROBLEM — C80.1 MALIGNANT (PRIMARY) NEOPLASM, UNSPECIFIED: Chronic | Status: ACTIVE | Noted: 2019-04-29

## 2019-05-30 PROBLEM — E11.9 TYPE 2 DIABETES MELLITUS WITHOUT COMPLICATIONS: Chronic | Status: ACTIVE | Noted: 2019-04-29

## 2019-05-30 PROBLEM — M62.81 MUSCLE WEAKNESS (GENERALIZED): Chronic | Status: ACTIVE | Noted: 2019-04-29

## 2019-05-30 PROBLEM — J90 PLEURAL EFFUSION, NOT ELSEWHERE CLASSIFIED: Chronic | Status: ACTIVE | Noted: 2019-04-29

## 2019-05-30 PROBLEM — R26.9 UNSPECIFIED ABNORMALITIES OF GAIT AND MOBILITY: Chronic | Status: ACTIVE | Noted: 2019-04-29

## 2019-05-30 PROCEDURE — 99239 HOSP IP/OBS DSCHRG MGMT >30: CPT

## 2019-05-30 PROCEDURE — 99233 SBSQ HOSP IP/OBS HIGH 50: CPT

## 2019-05-30 PROCEDURE — 99238 HOSP IP/OBS DSCHRG MGMT 30/<: CPT

## 2019-05-30 RX ORDER — LIDOCAINE 4 G/100G
1 CREAM TOPICAL
Qty: 0 | Refills: 0 | DISCHARGE
Start: 2019-05-30

## 2019-05-30 RX ORDER — ACETAMINOPHEN 500 MG
31.25 TABLET ORAL
Qty: 0 | Refills: 0 | DISCHARGE
Start: 2019-05-30

## 2019-05-30 RX ORDER — NYSTATIN CREAM 100000 [USP'U]/G
1 CREAM TOPICAL
Qty: 0 | Refills: 0 | DISCHARGE
Start: 2019-05-30

## 2019-05-30 RX ORDER — TAMOXIFEN CITRATE 20 MG/1
1 TABLET, FILM COATED ORAL
Qty: 0 | Refills: 0 | DISCHARGE

## 2019-05-30 RX ORDER — ENOXAPARIN SODIUM 100 MG/ML
40 INJECTION SUBCUTANEOUS
Qty: 0 | Refills: 0 | DISCHARGE
Start: 2019-05-30

## 2019-05-30 RX ORDER — ASCORBIC ACID 60 MG
1 TABLET,CHEWABLE ORAL
Qty: 0 | Refills: 0 | DISCHARGE
Start: 2019-05-30

## 2019-05-30 RX ORDER — MIDODRINE HYDROCHLORIDE 2.5 MG/1
1 TABLET ORAL
Qty: 0 | Refills: 0 | DISCHARGE
Start: 2019-05-30

## 2019-05-30 RX ORDER — MULTIVIT-MIN/FERROUS GLUCONATE 9 MG/15 ML
1 LIQUID (ML) ORAL
Qty: 0 | Refills: 0 | DISCHARGE

## 2019-05-30 RX ORDER — ONDANSETRON 8 MG/1
4 TABLET, FILM COATED ORAL
Qty: 0 | Refills: 0 | DISCHARGE

## 2019-05-30 RX ORDER — ZINC OXIDE 200 MG/G
1 OINTMENT TOPICAL
Qty: 0 | Refills: 0 | DISCHARGE
Start: 2019-05-30

## 2019-05-30 RX ORDER — CHLORHEXIDINE GLUCONATE 213 G/1000ML
15 SOLUTION TOPICAL
Qty: 0 | Refills: 0 | DISCHARGE
Start: 2019-05-30

## 2019-05-30 RX ORDER — ONDANSETRON 8 MG/1
4 TABLET, FILM COATED ORAL ONCE
Refills: 0 | Status: COMPLETED | OUTPATIENT
Start: 2019-05-30 | End: 2019-05-30

## 2019-05-30 RX ORDER — INSULIN GLARGINE 100 [IU]/ML
8 INJECTION, SOLUTION SUBCUTANEOUS
Qty: 0 | Refills: 0 | DISCHARGE
Start: 2019-05-30

## 2019-05-30 RX ORDER — OMEPRAZOLE 10 MG/1
1 CAPSULE, DELAYED RELEASE ORAL
Qty: 0 | Refills: 0 | DISCHARGE

## 2019-05-30 RX ORDER — INSULIN HUMAN 100 [IU]/ML
0 INJECTION, SOLUTION SUBCUTANEOUS
Qty: 0 | Refills: 0 | DISCHARGE
Start: 2019-05-30

## 2019-05-30 RX ORDER — FLUDROCORTISONE ACETATE 0.1 MG/1
1 TABLET ORAL
Qty: 0 | Refills: 0 | DISCHARGE
Start: 2019-05-30

## 2019-05-30 RX ADMIN — ENOXAPARIN SODIUM 40 MILLIGRAM(S): 100 INJECTION SUBCUTANEOUS at 22:41

## 2019-05-30 RX ADMIN — ONDANSETRON 4 MILLIGRAM(S): 8 TABLET, FILM COATED ORAL at 22:42

## 2019-05-30 RX ADMIN — Medication 1000 MILLIGRAM(S): at 07:40

## 2019-05-30 RX ADMIN — Medication 1000 MILLIGRAM(S): at 07:08

## 2019-05-30 RX ADMIN — NYSTATIN CREAM 1 APPLICATION(S): 100000 CREAM TOPICAL at 17:37

## 2019-05-30 RX ADMIN — ZINC OXIDE 1 APPLICATION(S): 200 OINTMENT TOPICAL at 14:15

## 2019-05-30 RX ADMIN — Medication 1000 MILLIGRAM(S): at 20:30

## 2019-05-30 RX ADMIN — MIDODRINE HYDROCHLORIDE 10 MILLIGRAM(S): 2.5 TABLET ORAL at 14:14

## 2019-05-30 RX ADMIN — ZINC OXIDE 1 APPLICATION(S): 200 OINTMENT TOPICAL at 06:25

## 2019-05-30 RX ADMIN — LINEZOLID 600 MILLIGRAM(S): 600 INJECTION, SOLUTION INTRAVENOUS at 02:25

## 2019-05-30 RX ADMIN — MIDODRINE HYDROCHLORIDE 10 MILLIGRAM(S): 2.5 TABLET ORAL at 22:41

## 2019-05-30 RX ADMIN — INSULIN GLARGINE 8 UNIT(S): 100 INJECTION, SOLUTION SUBCUTANEOUS at 23:50

## 2019-05-30 RX ADMIN — LINEZOLID 600 MILLIGRAM(S): 600 INJECTION, SOLUTION INTRAVENOUS at 11:59

## 2019-05-30 RX ADMIN — MIDODRINE HYDROCHLORIDE 10 MILLIGRAM(S): 2.5 TABLET ORAL at 06:25

## 2019-05-30 RX ADMIN — NYSTATIN CREAM 1 APPLICATION(S): 100000 CREAM TOPICAL at 06:25

## 2019-05-30 RX ADMIN — CHLORHEXIDINE GLUCONATE 15 MILLILITER(S): 213 SOLUTION TOPICAL at 22:41

## 2019-05-30 RX ADMIN — CHLORHEXIDINE GLUCONATE 15 MILLILITER(S): 213 SOLUTION TOPICAL at 11:58

## 2019-05-30 RX ADMIN — Medication 1000 MILLIGRAM(S): at 19:55

## 2019-05-30 RX ADMIN — Medication 500 MILLIGRAM(S): at 11:58

## 2019-05-30 RX ADMIN — ZINC OXIDE 1 APPLICATION(S): 200 OINTMENT TOPICAL at 22:42

## 2019-05-30 RX ADMIN — FLUDROCORTISONE ACETATE 0.1 MILLIGRAM(S): 0.1 TABLET ORAL at 06:25

## 2019-05-30 NOTE — PROGRESS NOTE ADULT - ASSESSMENT
60yo F HD31 with stage IV endometrial AdenoCA s/p staging surgery '18 and 1 round of chemotherapy 2/19 readmitted from  [facility] with urosepsis and poor respiratory status which is now thought to be secondary to diagnosis of Guillain Chadwicks Syndrome/AIDP.  Hospital course notable for prolonged intubation and MICU admission, transitioned to tracheostomy and PEG tube on 5/21, now weened off pressors and transferred to chronic vent unit on [facility].   Planning for transfer to [facility] today    1. Neuro: primary diagnosis of AIDP/GBS, s/p IVIG and plasmapheresis.  Weakness continues to improve however still with persistent respiratory weakness.  Neuro following.  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 Pulm: Tracheostomy (5/21-), tolerating short trials of CPAP, otherwise vent dependent  3. Cardio: On Florinef, midodrine decreased to 10mg TID, s/p levo gtt  4. FEN/GI: PEG 5/21- Repletion of electrolytes prn   5. : Neurogenic bladder w/ Indwelling joseph in place  When stable, consider urology consult for long term management i.e. for placement suprapubic catheter  6. ID: Afebrile currently. Switched to Linezolid 600mg BID (5/24-), last dose due today. Last urine culture 5/22 reveals VRE and Sputum culture 5/22 and Bronchial wash 5/21 reveals MRSA s/p Vancomycin 1g (5/22-5/24), s/p Meropenem 5/22, s/p ertapenem ended 5/14. F/u ID recs   7. Endocrine: FS, ISS, Lantus 8u qhs (started 5/14)  8. VTE prophylaxis - Upper extremity superficial vein thrombosis noted 5/17, repeat doppler done 5/23-shows stable superficial thrombosis -Lovenox 40mg daily LE Dopplers negative   9. GYN malignancy  - s/p Anastrazole, s/p Megace 80mg BID x3 wks , s/p Tamoxifen 5/1-5/17 (stopped due to upper extremity DVT). Once patient recovers from acute neurologic issue will reassess cancer treatment options   -  Will avoid immunotherapy at this time given associated risk of autoimmune disease   10. Derm: monitor sacrum for s/s of pressure ulcer, now stage 2- continue with zinc oxide   11. Follow up PT/OT recs   12. Social work/palliative: Transferring to LTACH today.  DNR/DNI. Interdisciplinary meeting was done on 5/23

## 2019-05-30 NOTE — PROGRESS NOTE ADULT - PROBLEM SELECTOR PLAN 1
2/2 to demyelinating polyneuropathy (AIDP), patient has required 3 intubations this admission, now s/p trach (5/21) and s/p 4 doses of IVIG finished 5/11 and 5 rounds of plasmapheresis ( finished 5/21)  - C/w mechanical ventilation at night and cpap during day. wean as tolerated  - C/w frequent suctioning

## 2019-05-30 NOTE — PROGRESS NOTE ADULT - SUBJECTIVE AND OBJECTIVE BOX
GYN Progress Note    Patient seen at bedside for AM rounds.  Using lap-top in bed.  Reports feeling well; still has some discomfort from trach but improved from yesterday after loosening.  She notes mild upper back pain.  Denies nausea/vomiting, no subjective shortness of breath.  Trach in place, still with tube feeds per PEG.  Planning for transfer to LTACH today - pt feels optimistic.    Vital Signs Last 24 Hrs  T(C): 36.8 (30 May 2019 05:18), Max: 37.3 (29 May 2019 15:46)  T(F): 98.3 (30 May 2019 05:18), Max: 99.1 (29 May 2019 15:46)  HR: 69 (30 May 2019 06:27) (66 - 82)  BP: 121/78 (30 May 2019 05:18) (118/63 - 145/72)  BP(mean): --  RR: 14 (30 May 2019 06:27) (12 - 19)  SpO2: 100% (30 May 2019 06:27) (99% - 100%)    Physical Exam:  Gen: No Acute Distress, in good spirits  Pulm: trach in place, on AC  GI: soft, nontender, nondistended, +BS, no rebound, no guarding.  Stoma in place, pink, functioning  Ext: 5/5 strength in upper extremities, 2/5 in lower extremities.  No edema or erythema in calves bilaterally  [place] in situ, clear urine    I&O's Summary    29 May 2019 07:01  -  30 May 2019 07:00  --------------------------------------------------------  IN: 440 mL / OUT: 1150 mL / NET: -710 mL      MEDICATIONS  (STANDING):  ascorbic acid 500 milliGRAM(s) Oral daily  chlorhexidine 0.12% Liquid 15 milliLiter(s) Oral Mucosa every 12 hours  dextrose 5%. 1000 milliLiter(s) (50 mL/Hr) IV Continuous <Continuous>  dextrose 50% Injectable 25 Gram(s) IV Push once  enoxaparin Injectable 40 milliGRAM(s) SubCutaneous every 24 hours  fludroCORTISONE 0.1 milliGRAM(s) Oral every 24 hours  Heparin 1000 Units / ml 2 milliLiter(s) 2 milliLiter(s) IV Push once  insulin glargine Injectable (LANTUS) 8 Unit(s) SubCutaneous at bedtime  insulin regular  human corrective regimen sliding scale   SubCutaneous every 6 hours  linezolid    Suspension 600 milliGRAM(s) Enteral Tube every 12 hours  midodrine 10 milliGRAM(s) Oral every 8 hours  nystatin Cream 1 Application(s) Topical two times a day  zinc oxide 20% Ointment 1 Application(s) Topical three times a day    MEDICATIONS  (PRN):  acetaminophen    Suspension .. 1000 milliGRAM(s) Oral every 8 hours PRN Moderate Pain (4 - 6)  dextrose 40% Gel 15 Gram(s) Oral once PRN Blood Glucose LESS THAN 70 milliGRAM(s)/deciliter  glucagon  Injectable 1 milliGRAM(s) IntraMuscular once PRN Glucose LESS THAN 70 milligrams/deciliter  lidocaine 2% Gel 1 Application(s) Topical two times a day PRN pain around PEG site  LORazepam     Tablet 0.5 milliGRAM(s) Oral every 8 hours PRN Anxiety  sodium chloride 0.9% lock flush 10 milliLiter(s) IV Push every 1 hour PRN Pre/post blood products, medications, blood draw, and to maintain line patency      LABS:                        8.0    8.72  )-----------( 309      ( 29 May 2019 07:23 )             26.9     05-29    140  |  99  |  15  ----------------------------<  137<H>  4.2   |  29  |  0.42<L>    Ca    10.2      29 May 2019 07:23  Mg     1.8     05-29

## 2019-05-30 NOTE — PROGRESS NOTE ADULT - PROBLEM SELECTOR PLAN 8
Hx of urinary retention/overflow incontinence   - Joseph changed on 5/17   - per urology, continue w joseph    #PEG placement  - Pt s/p PEG placement 5/21   - Continue w/ Osmolite 1.2     #Cephalic vein thrombosis  - As above  - Was started on AC w/ Lovenox 60mg BID but given the location of thrombus, A/C was dc'd   - Repeat US of LUE 5/23 showed stable thrombus

## 2019-05-30 NOTE — PROGRESS NOTE ADULT - ASSESSMENT
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 Today patient appears mildly improved in strength compared to exam yesterday. She was placed on CPAP trial with similar Tv of 250s.     -Given high risk of infection would not recommend plasma exchange as Central Line a source of infection  -Patient should receive Privigen or Gammunex IVIg 0.5g/kg one time every 2 weeks, end date to be determined  -Patient can follow with Dr. Sellers when she is able to from LTAC facility  -Continue attempts to wean from vent  -Recommend sitting up in a chair position as much as possible  -PT  -Continue supportive care   -Rest of management as per primary team

## 2019-05-30 NOTE — PROGRESS NOTE ADULT - ATTENDING COMMENTS
Patient was seen and examined with the resident team today.  I agree with Dr. Vincent's assessment and plan with the following exceptions/additions:     This is a 60yo woman with a PMH of polio c/b post-polio syndrome, breast cancer, endometrial cancer s/p explap, enterolysis, SHAMIR, BSO, pelvic lymphadenectomy, low anterior resection mobilization of splenic flexure and end colostomy (7/30/18), rectovaginal fistula, as well as, numerous admissions for urinary tract infections who was initially admitted for ESBL E. coli bacteremia c/b septic shock, now s/p a protracted hospital course.  Admission has been complicated by acute (now chronic) hypercapnic respiratory failure requiring multiple intubations 2/2 AIDP (s/p IVIG and plasmapheresis), now s/p trach and PEG ( 5/21) and sepsis 2/2 MRSA HAP/VAP and VRE UTI.  Last day of antibiotics on 5/30.     Discharge to LTAC postponed today as coordinating outpatient IVIG (Privigen).    -- c/w pressure support as tolerated but would ensure back on AC/VC overnight  -- c/w TFs  -- daily PT and OT if able  -- c/w Midodrine and Florinef and wean as able  -- IVIG [id number] --> will work with pharmacy to attempt obtaining it   -- c/w insulin for FS goal <160  -- Neuro following, appreciate assistance  -- Gyn following, appreciate assistance   -- DVT PPx - Lovenox  -- Dispo - LTAC, Pall care assisting    Maya Christian  [phone]

## 2019-05-30 NOTE — PROGRESS NOTE ADULT - SUBJECTIVE AND OBJECTIVE BOX
OVERNIGHT EVENTS: KESHAWN    SUBJECTIVE: Patient excited this morning to go to the LTAC, however found out later in day that LTAC cannot give IVIG. Patient understand issue and is OK w staying in hospital. She has no other complaints and is breathing well on CPAP.    Vital Signs Last 12 Hrs  T(F): 98.7 (05-30-19 @ 08:41), Max: 98.7 (05-30-19 @ 08:41)  HR: 67 (05-30-19 @ 10:00) (66 - 70)  BP: 142/84 (05-30-19 @ 08:41) (121/78 - 142/84)  BP(mean): --  RR: 18 (05-30-19 @ 10:00) (14 - 18)  SpO2: 99% (05-30-19 @ 10:00) (99% - 100%)  I&O's Summary    29 May 2019 07:01  -  30 May 2019 07:00  --------------------------------------------------------  IN: 440 mL / OUT: 1150 mL / NET: -710 mL        PHYSICAL EXAM:  Constitutional: NAD, comfortable in bed, on cpap  HEENT: NC/AT, PERRLA, EOMI, no conjunctival pallor or scleral icterus, MMM  Neck: Supple, no JVD, tracheostomy site CDI  Respiratory: CTAB. No w/r/r. on cpap  Cardiovascular: RRR, normal S1 and S2, no m/r/g. R sided chest chemoport, R sided PICC line  Gastrointestinal: +BS, soft NTND, no guarding or rebound tenderness, no palpable masses, +PEG tube site CDI, L sided colostomy bag w gas  Extremities: wwp; no cyanosis, clubbing or edema.   Vascular: Pulses equal and strong throughout.   Neurological: AAOx3, no CN deficits, strength and sensation intact in upper extremities including  strength, LE strength 3/5        LABS:                        8.0    8.72  )-----------( 309      ( 29 May 2019 07:23 )             26.9     05-29    140  |  99  |  15  ----------------------------<  137<H>  4.2   |  29  |  0.42<L>    Ca    10.2      29 May 2019 07:23  Mg     1.8     05-29            RADIOLOGY & ADDITIONAL TESTS:    MEDICATIONS  (STANDING):  ascorbic acid 500 milliGRAM(s) Oral daily  chlorhexidine 0.12% Liquid 15 milliLiter(s) Oral Mucosa every 12 hours  dextrose 5%. 1000 milliLiter(s) (50 mL/Hr) IV Continuous <Continuous>  dextrose 50% Injectable 25 Gram(s) IV Push once  enoxaparin Injectable 40 milliGRAM(s) SubCutaneous every 24 hours  fludroCORTISONE 0.1 milliGRAM(s) Oral every 24 hours  Heparin 1000 Units / ml 2 milliLiter(s) 2 milliLiter(s) IV Push once  insulin glargine Injectable (LANTUS) 8 Unit(s) SubCutaneous at bedtime  insulin regular  human corrective regimen sliding scale   SubCutaneous every 6 hours  linezolid    Suspension 600 milliGRAM(s) Enteral Tube every 12 hours  midodrine 10 milliGRAM(s) Oral every 8 hours  nystatin Cream 1 Application(s) Topical two times a day  zinc oxide 20% Ointment 1 Application(s) Topical three times a day    MEDICATIONS  (PRN):  acetaminophen    Suspension .. 1000 milliGRAM(s) Oral every 8 hours PRN Moderate Pain (4 - 6)  dextrose 40% Gel 15 Gram(s) Oral once PRN Blood Glucose LESS THAN 70 milliGRAM(s)/deciliter  glucagon  Injectable 1 milliGRAM(s) IntraMuscular once PRN Glucose LESS THAN 70 milligrams/deciliter  lidocaine 2% Gel 1 Application(s) Topical two times a day PRN pain around PEG site  LORazepam     Tablet 0.5 milliGRAM(s) Oral every 8 hours PRN Anxiety  sodium chloride 0.9% lock flush 10 milliLiter(s) IV Push every 1 hour PRN Pre/post blood products, medications, blood draw, and to maintain line patency

## 2019-05-30 NOTE — PROGRESS NOTE ADULT - PROBLEM SELECTOR PLAN 4
Pt spiked to 101.6  on 5/22. UA  positive, UCx growing Enterococcus and Bronch wash growing MRSA; Was on vancomycin ( 5/22- 5/24) d/c due to Cx positive for VRE.  - s/p linezolid for a total of 7 days (5/24 - 5/30)   - BCx 5/22 NGTD

## 2019-05-30 NOTE — PROGRESS NOTE ADULT - PROBLEM SELECTOR PLAN 7
Stage IV endometrial adenocarcinoma; Per GYN-ONC w/ interval increase in tumor burden. Pt was treated with Anastrazole and initiated 3 weeks of megace 80mg BID followed by 3 weeks of tamoxifen. Pt was on tamoxifen and developed a left cephalic v. thrombus. Tamoxifen was dc'd on 5/17   - Per GYN, no anticipation of continuing chemotherapy    #Hyperglycemia  - Continue Lantus 8 U at bedtime   - SSI and FSG     #Anxiety   - 2/2 ongoing medical issues and prognosis  - Continue w/ Ativan 0.5 mg q6h PRN  - remeron 15mg qhs prn for insomnia

## 2019-05-30 NOTE — PROGRESS NOTE ADULT - SUBJECTIVE AND OBJECTIVE BOX
O/N Events: KESHAWN  Subjective/ROS: Feels excited about leaving for LTAC. Denies HA, CP, SOB, n/v, changes in bowel/urinary habits.  12pt ROS otherwise negative.    VITALS  Vital Signs Last 24 Hrs  T(C): 36.8 (30 May 2019 05:18), Max: 37.3 (29 May 2019 15:46)  T(F): 98.3 (30 May 2019 05:18), Max: 99.1 (29 May 2019 15:46)  HR: 69 (30 May 2019 06:27) (66 - 82)  BP: 121/78 (30 May 2019 05:18) (118/63 - 145/72)  BP(mean): --  RR: 14 (30 May 2019 06:27) (12 - 19)  SpO2: 100% (30 May 2019 06:27) (99% - 100%)    I&O's Summary    29 May 2019 07:01  -  30 May 2019 07:00  --------------------------------------------------------  IN: 440 mL / OUT: 1150 mL / NET: -710 mL        CAPILLARY BLOOD GLUCOSE      POCT Blood Glucose.: 136 mg/dL (30 May 2019 06:20)  POCT Blood Glucose.: 141 mg/dL (29 May 2019 22:25)  POCT Blood Glucose.: 147 mg/dL (29 May 2019 17:57)  POCT Blood Glucose.: 129 mg/dL (29 May 2019 12:09)      PHYSICAL EXAM  Gen: Sleeping, lying comfortably in bed, laughing appropriately to jokes  CV: RRR, S1/S2  Resp: CTABL  Neurologic:  - Mental Status:  AAOx3; speech is limited from trach but appears fluent with intact naming, repetition, and comprehension  - Cranial Nerves II-XII:    II:  PERRLA; visual fields are full to confrontation  III, IV, VI:  EOMI, no nystagmus  V:  facial sensation is intact in the V1-V3 distribution bilaterally.  VII:  face is symmetric with residual weakness on eye closure and smile  VIII:  hearing is intact to finger rub  IX, X:  uvula is midline and soft palate rises symmetrically  XI:  head turning and shoulder shrug are intact bilaterally  XII:  tongue protrudes in the midline  - Motor:  strength is 4-5/5 throughout upper extremity; Hip Flexion 2/5, Knee Extension and Flexion 1-2/5, Ankle 1-2/5;  - Reflexes: A reflexic   - Gait:  deferred      MEDICATIONS  (STANDING):  ascorbic acid 500 milliGRAM(s) Oral daily  chlorhexidine 0.12% Liquid 15 milliLiter(s) Oral Mucosa every 12 hours  dextrose 5%. 1000 milliLiter(s) (50 mL/Hr) IV Continuous <Continuous>  dextrose 50% Injectable 25 Gram(s) IV Push once  enoxaparin Injectable 40 milliGRAM(s) SubCutaneous every 24 hours  fludroCORTISONE 0.1 milliGRAM(s) Oral every 24 hours  Heparin 1000 Units / ml 2 milliLiter(s) 2 milliLiter(s) IV Push once  insulin glargine Injectable (LANTUS) 8 Unit(s) SubCutaneous at bedtime  insulin regular  human corrective regimen sliding scale   SubCutaneous every 6 hours  lidocaine 2% Gel 1 Application(s) Topical daily  linezolid    Suspension 600 milliGRAM(s) Enteral Tube every 12 hours  midodrine 10 milliGRAM(s) Oral every 8 hours  nystatin Cream 1 Application(s) Topical two times a day  zinc oxide 20% Ointment 1 Application(s) Topical three times a day    MEDICATIONS  (PRN):  acetaminophen    Suspension .. 1000 milliGRAM(s) Oral every 8 hours PRN Moderate Pain (4 - 6)  dextrose 40% Gel 15 Gram(s) Oral once PRN Blood Glucose LESS THAN 70 milliGRAM(s)/deciliter  glucagon  Injectable 1 milliGRAM(s) IntraMuscular once PRN Glucose LESS THAN 70 milligrams/deciliter  lidocaine 2% Gel 1 Application(s) Topical two times a day PRN pain around PEG site  LORazepam     Tablet 0.5 milliGRAM(s) Oral every 8 hours PRN Anxiety  sodium chloride 0.9% lock flush 10 milliLiter(s) IV Push every 1 hour PRN Pre/post blood products, medications, blood draw, and to maintain line patency      IVIG PRODUCT IS PRIGIVEN OR GAMMUNEX (Unknown)  No Known Allergies      LABS                        8.0    8.72  )-----------( 309      ( 29 May 2019 07:23 )             26.9     05-28    140  |  100  |  15  ----------------------------<  130<H>  3.8   |  31  |  0.39<L>    Ca    9.8      28 May 2019 06:09  Mg     1.9     05-28

## 2019-05-30 NOTE — PROGRESS NOTE ADULT - ASSESSMENT
60 yo F PMHx polio, breast cancer, DM, Endometrial Cancer s/p exploratory laparotomy, enterolysis, SHAMIR, BSO, pelvic lymphadenectomy, low anterior resection mobilization of splenic flexure, end colostomy on 7/30/18, rectovaginal fistula, numerous admissions for urinary tract infection presented to [facility] in the setting of urosepsis. Hospital course complicated by ESBL E coli bacteremia (completed ertapenem on 5/14 ) and respiratory failure requiring multiple intubations 2/2 AIDP, now s/p trach and PEG (5/21), s/p treatment for sepsis 2/2 MRSA PNA w/ linezolid, now pending placement to facility to give IVIG.

## 2019-05-31 PROCEDURE — 99233 SBSQ HOSP IP/OBS HIGH 50: CPT

## 2019-05-31 PROCEDURE — 99222 1ST HOSP IP/OBS MODERATE 55: CPT | Mod: GC,AI

## 2019-05-31 RX ORDER — CHLORHEXIDINE GLUCONATE 213 G/1000ML
1 SOLUTION TOPICAL
Refills: 0 | Status: DISCONTINUED | OUTPATIENT
Start: 2019-05-31 | End: 2019-06-19

## 2019-05-31 RX ADMIN — Medication 1000 MILLIGRAM(S): at 07:00

## 2019-05-31 RX ADMIN — Medication 500 MILLIGRAM(S): at 12:54

## 2019-05-31 RX ADMIN — ZINC OXIDE 1 APPLICATION(S): 200 OINTMENT TOPICAL at 21:50

## 2019-05-31 RX ADMIN — MIDODRINE HYDROCHLORIDE 10 MILLIGRAM(S): 2.5 TABLET ORAL at 14:37

## 2019-05-31 RX ADMIN — CHLORHEXIDINE GLUCONATE 15 MILLILITER(S): 213 SOLUTION TOPICAL at 12:54

## 2019-05-31 RX ADMIN — INSULIN GLARGINE 8 UNIT(S): 100 INJECTION, SOLUTION SUBCUTANEOUS at 22:14

## 2019-05-31 RX ADMIN — CHLORHEXIDINE GLUCONATE 15 MILLILITER(S): 213 SOLUTION TOPICAL at 21:49

## 2019-05-31 RX ADMIN — ENOXAPARIN SODIUM 40 MILLIGRAM(S): 100 INJECTION SUBCUTANEOUS at 21:49

## 2019-05-31 RX ADMIN — ZINC OXIDE 1 APPLICATION(S): 200 OINTMENT TOPICAL at 14:37

## 2019-05-31 RX ADMIN — NYSTATIN CREAM 1 APPLICATION(S): 100000 CREAM TOPICAL at 06:37

## 2019-05-31 RX ADMIN — MIDODRINE HYDROCHLORIDE 10 MILLIGRAM(S): 2.5 TABLET ORAL at 06:37

## 2019-05-31 RX ADMIN — Medication 1000 MILLIGRAM(S): at 14:46

## 2019-05-31 RX ADMIN — INSULIN HUMAN 2: 100 INJECTION, SOLUTION SUBCUTANEOUS at 00:03

## 2019-05-31 RX ADMIN — FLUDROCORTISONE ACETATE 0.1 MILLIGRAM(S): 0.1 TABLET ORAL at 06:37

## 2019-05-31 RX ADMIN — Medication 1000 MILLIGRAM(S): at 06:36

## 2019-05-31 RX ADMIN — Medication 1000 MILLIGRAM(S): at 15:33

## 2019-05-31 RX ADMIN — ZINC OXIDE 1 APPLICATION(S): 200 OINTMENT TOPICAL at 06:37

## 2019-05-31 RX ADMIN — NYSTATIN CREAM 1 APPLICATION(S): 100000 CREAM TOPICAL at 18:39

## 2019-05-31 RX ADMIN — MIDODRINE HYDROCHLORIDE 10 MILLIGRAM(S): 2.5 TABLET ORAL at 21:49

## 2019-05-31 RX ADMIN — CHLORHEXIDINE GLUCONATE 1 APPLICATION(S): 213 SOLUTION TOPICAL at 12:54

## 2019-05-31 NOTE — CHART NOTE - NSCHARTNOTEFT_GEN_A_CORE
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 Provides: 1056mL TV, 1367kcal, 74g protein, 866mL free H2O, 106% RDI, 1.55g/kg IBW protein, 22.5 non pro kcal    GI Issues: nausea noted, being managed/ improved, no v/c/d    Pain: appearing comfortable, resting     Skin Integrity: stage 2 pressure ulcer     Labs: no new labs     CAPILLARY BLOOD GLUCOSE      POCT Blood Glucose.: 149 mg/dL (31 May 2019 06:37)  POCT Blood Glucose.: 154 mg/dL (30 May 2019 23:40)  POCT Blood Glucose.: 156 mg/dL (30 May 2019 22:05)  POCT Blood Glucose.: 134 mg/dL (30 May 2019 17:30)  POCT Blood Glucose.: 139 mg/dL (30 May 2019 11:47)      Medications:  MEDICATIONS  (STANDING):  ascorbic acid 500 milliGRAM(s) Oral daily  chlorhexidine 0.12% Liquid 15 milliLiter(s) Oral Mucosa every 12 hours  chlorhexidine 2% Cloths 1 Application(s) Topical <User Schedule>  dextrose 5%. 1000 milliLiter(s) (50 mL/Hr) IV Continuous <Continuous>  dextrose 50% Injectable 25 Gram(s) IV Push once  enoxaparin Injectable 40 milliGRAM(s) SubCutaneous every 24 hours  fludroCORTISONE 0.1 milliGRAM(s) Oral every 24 hours  Heparin 1000 Units / ml 2 milliLiter(s) 2 milliLiter(s) IV Push once  insulin glargine Injectable (LANTUS) 8 Unit(s) SubCutaneous at bedtime  insulin regular  human corrective regimen sliding scale   SubCutaneous every 6 hours  midodrine 10 milliGRAM(s) Oral every 8 hours  nystatin Cream 1 Application(s) Topical two times a day  zinc oxide 20% Ointment 1 Application(s) Topical three times a day    MEDICATIONS  (PRN):  acetaminophen    Suspension .. 1000 milliGRAM(s) Oral every 8 hours PRN Moderate Pain (4 - 6)  dextrose 40% Gel 15 Gram(s) Oral once PRN Blood Glucose LESS THAN 70 milliGRAM(s)/deciliter  glucagon  Injectable 1 milliGRAM(s) IntraMuscular once PRN Glucose LESS THAN 70 milligrams/deciliter  lidocaine 2% Gel 1 Application(s) Topical two times a day PRN pain around PEG site  sodium chloride 0.9% lock flush 10 milliLiter(s) IV Push every 1 hour PRN Pre/post blood products, medications, blood draw, and to maintain line patency      Weight: 53.3kg     Weight Change: -5kg loss since admission     Nutrition Focused Physical Exam: Completed [   ]  Not Pertinent [  x ]    Estimated energy needs:   Ideal body weight (47.7kg) used for calculations as pt >120% of IBW.   Nutrient needs based on [facility] standards of care for maintenance in older adults.   needs adjusted for age, PU stage 2, catabolic illness, vent  1190-1428kcal/day (25-30kcal/kg)  67-76g pro/day (1.4-1.6g/kg)  1428-1666ml fluid/day (30-35ml/kg)    Subjective:   55yo F w known stage IV endometrial cancer, likely with progression of disease, complex fistulae, chronic indwelling [place] admitted with fever and urosepsis. Pt intubated 2/2 acute hypoxic respiratory failure, and successfully extubated on 5/2. Pt transferred to [facility], and started on PO diet. Required reintubation on 5/8 2/2 CO2 narcosis likely d/t respiratory muscle insufficiency/AIDP/GBS. S/p IVIG and s/p LP. Pt extubated on 5/10, though reintubated on 5/13 for CO2 narcosis. Completed 5 treatments of plasmapheresis; last on 5/21. Pt was initially declining trach/PEG and was going to be extubated, though she changed her mind and trach/PEG placed on 5/21. GOC discussion 5/23, pt made DNR/DNI however wanting to pursue treatment. Moved to [facility] on 5/25 for further monitoring. Pt trached to vent was on CPAP attempting to be weaned off, trach collar trial pending as pt with episodes of apnea on vent. Able to tolerate CPAP intermittently however continues with apnea, vent placed back on ACVC mode. EN running at goal rate 44ml/hr. Pt is now stable for DC to LTAC, accepted to facilities however facilities do not do IVIG which pt requires, now pending additional options and facility for DC. Will continue to follow per RD protocol.     Previous Nutrition Diagnosis: Increased nutrient needs RT increased demand for kcal/pro AEB wt loss, pressure ulcer, catabolic illness     Active [ x  ]  Resolved [   ]    If resolved, new PES:     Goal: Pt to consistently meet % of estimated needs via tolerated route     Recommendations:  1. Continue with current EN order  2. Monitor for s/s intolerance; maintain aspiration precautions at all times   3. Monitor lytes and replete prn. POC BG Q6hrs   4. Pain and bowel regimens per team     Education:  no further ed needs identified    Risk Level: High [ x  ] Moderate [   ] Low [   ]

## 2019-05-31 NOTE — PROGRESS NOTE ADULT - PROBLEM SELECTOR PLAN 7
Stage IV endometrial adenocarcinoma; Per GYN-ONC w/ interval increase in tumor burden. Pt was treated with Anastrazole and initiated 3 weeks of megace 80mg BID followed by 3 weeks of tamoxifen. Pt was on tamoxifen and developed a left cephalic v. thrombus. Tamoxifen was dc'd on 5/17   - Per GYN, no anticipation of continuing chemotherapy    #Hyperglycemia  - Continue Lantus 8 U at bedtime   - SSI and FSG     #Anxiety   - 2/2 ongoing medical issues and prognosis  - remeron 15mg qhs prn for insomnia

## 2019-05-31 NOTE — PROGRESS NOTE ADULT - ATTENDING COMMENTS
Patient was seen and examined with the resident team today.  I agree with Dr. Vincent's assessment and plan with the following exceptions/additions:     This is a 60yo woman with a PMH of polio c/b post-polio syndrome, breast cancer, endometrial cancer s/p explap, enterolysis, SHAMIR, BSO, pelvic lymphadenectomy, low anterior resection mobilization of splenic flexure and end colostomy (7/30/18), rectovaginal fistula, as well as, numerous admissions for urinary tract infections who was initially admitted for ESBL E. coli bacteremia c/b septic shock, now s/p a protracted hospital course.  Admission has been complicated by acute (now chronic) hypercapnic respiratory failure requiring multiple intubations 2/2 AIDP (s/p IVIG and plasmapheresis), now s/p trach and PEG ( 5/21).  Latter part of hospitalization developed another episode of sepsis 2/2 MRSA HAP/VAP and VRE UTI.  Last day of antibiotics on 5/30.     Discharge to LTAC postponed as of 5/30 as coordinating outpatient IVIG (Privigen/Gammunex).  SW and Neurology are coordination.    -- c/w pressure support as tolerated but would ensure back on AC/VC overnight  -- c/w TFs  -- daily PT and OT if able  -- c/w Midodrine and Florinef and wean as able  -- IVIG [id number] --> will work with pharmacy to attempt obtaining it   -- c/w insulin for FS goal <160  -- Neuro following, appreciate assistance  -- Gyn following, appreciate assistance   -- DVT PPx - Lovenox  -- Dispo - LTAC, Pall care assisting    Maya Christian  [phone]

## 2019-05-31 NOTE — PROGRESS NOTE ADULT - PROBLEM SELECTOR PLAN 3
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 s/p 4 rounds of IVIG and 5 rounds of plasmapheresis (completed 5/21). 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  - C/w plan as per above problem 1 & 2  - Start 0.5g/kg of IVIG every 2 weeks starting 6/3. will need either privigen or gammunex  - Neurology on board trying to help coordinate IVIG administration

## 2019-05-31 NOTE — PROGRESS NOTE ADULT - SUBJECTIVE AND OBJECTIVE BOX
O/N Events: Patient discharge to LTAC was postponed to aid in finding IVIg infusion treatment.   Subjective/ROS: Denies HA, CP, SOB, n/v, changes in bowel/urinary habits.  12pt ROS otherwise negative.    VITALS  Vital Signs Last 24 Hrs  T(C): 37.4 (31 May 2019 06:40), Max: 37.6 (30 May 2019 14:10)  T(F): 99.3 (31 May 2019 06:40), Max: 99.6 (30 May 2019 14:10)  HR: 67 (31 May 2019 06:40) (64 - 77)  BP: 146/80 (31 May 2019 06:40) (113/72 - 155/95)  BP(mean): --  RR: 18 (31 May 2019 06:40) (16 - 19)  SpO2: 100% (31 May 2019 06:40) (98% - 100%)    CAPILLARY BLOOD GLUCOSE      POCT Blood Glucose.: 149 mg/dL (31 May 2019 06:37)  POCT Blood Glucose.: 154 mg/dL (30 May 2019 23:40)  POCT Blood Glucose.: 156 mg/dL (30 May 2019 22:05)  POCT Blood Glucose.: 134 mg/dL (30 May 2019 17:30)  POCT Blood Glucose.: 139 mg/dL (30 May 2019 11:47)      PHYSICAL EXAM  General: A&Ox3; NAD  Head: NC/AT; MMM; PERRL; EOMI;  Neck: Supple; no JVD  Respiratory: CTA B/L; no wheezes/crackles   Cardiovascular: Regular rhythm/rate; S1/S2   Gastrointestinal: Soft; NTND; normoactive BS  Extremities: WWP; no edema/cyanosis  Neurological:  CNII-XII grossly intact; no obvious focal deficits    MEDICATIONS  (STANDING):  ascorbic acid 500 milliGRAM(s) Oral daily  chlorhexidine 0.12% Liquid 15 milliLiter(s) Oral Mucosa every 12 hours  dextrose 5%. 1000 milliLiter(s) (50 mL/Hr) IV Continuous <Continuous>  dextrose 50% Injectable 25 Gram(s) IV Push once  enoxaparin Injectable 40 milliGRAM(s) SubCutaneous every 24 hours  fludroCORTISONE 0.1 milliGRAM(s) Oral every 24 hours  Heparin 1000 Units / ml 2 milliLiter(s) 2 milliLiter(s) IV Push once  insulin glargine Injectable (LANTUS) 8 Unit(s) SubCutaneous at bedtime  insulin regular  human corrective regimen sliding scale   SubCutaneous every 6 hours  midodrine 10 milliGRAM(s) Oral every 8 hours  nystatin Cream 1 Application(s) Topical two times a day  zinc oxide 20% Ointment 1 Application(s) Topical three times a day    MEDICATIONS  (PRN):  acetaminophen    Suspension .. 1000 milliGRAM(s) Oral every 8 hours PRN Moderate Pain (4 - 6)  dextrose 40% Gel 15 Gram(s) Oral once PRN Blood Glucose LESS THAN 70 milliGRAM(s)/deciliter  glucagon  Injectable 1 milliGRAM(s) IntraMuscular once PRN Glucose LESS THAN 70 milligrams/deciliter  lidocaine 2% Gel 1 Application(s) Topical two times a day PRN pain around PEG site  sodium chloride 0.9% lock flush 10 milliLiter(s) IV Push every 1 hour PRN Pre/post blood products, medications, blood draw, and to maintain line patency      IVIG PRODUCT IS PRIGIVEN OR GAMMUNEX (Unknown)  No Known Allergies      LABS                        8.0    8.72  )-----------( 309      ( 29 May 2019 07:23 )             26.9     05-29    140  |  99  |  15  ----------------------------<  137<H>  4.2   |  29  |  0.42<L>    Ca    10.2      29 May 2019 07:23  Mg     1.8     05-29                IMAGING/EKG/ETC  EKG:  Xray:  CT:  MRI:

## 2019-05-31 NOTE — PROGRESS NOTE ADULT - SUBJECTIVE AND OBJECTIVE BOX
OVERNIGHT EVENTS: Patient felt a bit nauseas last night which improved with zofran.    SUBJECTIVE: Patient in good spirits today. Feels well at current. Wanted to try to go on CPAP, however apneic for 30 seconds so apnea alarm went off - placed patient back on ACVC.    Vital Signs Last 12 Hrs  T(F): 99.3 (05-31-19 @ 06:40), Max: 99.3 (05-31-19 @ 06:40)  HR: 78 (05-31-19 @ 07:16) (67 - 78)  BP: 146/80 (05-31-19 @ 06:40) (113/72 - 146/80)  BP(mean): --  RR: 18 (05-31-19 @ 06:40) (18 - 19)  SpO2: 100% (05-31-19 @ 07:16) (98% - 100%)  I&O's Summary    30 May 2019 07:01  -  31 May 2019 07:00  --------------------------------------------------------  IN: 0 mL / OUT: 500 mL / NET: -500 mL        PHYSICAL EXAM:  Constitutional: NAD, comfortable in bed.  HEENT: NC/AT, PERRLA, EOMI, no conjunctival pallor or scleral icterus, MMM  Neck: Supple, no JVD, tracheostomy in place CDI  Respiratory: on ACVC, clear lungs  Cardiovascular: RRR, normal S1 and S2, no m/r/g.   Gastrointestinal: +BS, soft NTND, no guarding or rebound tenderness, no palpable masses, PEG in place - no signs of infxn  Extremities: wwp; no cyanosis, clubbing or edema.   Vascular: Pulses equal and strong throughout, R PICC line, R chest chemoport  Neurological: AAOx3, no CN deficits, strength 5/5 in UEs and 3/5 in LEs, sensation intact throughout.         LABS:                RADIOLOGY & ADDITIONAL TESTS:    MEDICATIONS  (STANDING):  ascorbic acid 500 milliGRAM(s) Oral daily  chlorhexidine 0.12% Liquid 15 milliLiter(s) Oral Mucosa every 12 hours  chlorhexidine 2% Cloths 1 Application(s) Topical <User Schedule>  dextrose 5%. 1000 milliLiter(s) (50 mL/Hr) IV Continuous <Continuous>  dextrose 50% Injectable 25 Gram(s) IV Push once  enoxaparin Injectable 40 milliGRAM(s) SubCutaneous every 24 hours  fludroCORTISONE 0.1 milliGRAM(s) Oral every 24 hours  Heparin 1000 Units / ml 2 milliLiter(s) 2 milliLiter(s) IV Push once  insulin glargine Injectable (LANTUS) 8 Unit(s) SubCutaneous at bedtime  insulin regular  human corrective regimen sliding scale   SubCutaneous every 6 hours  midodrine 10 milliGRAM(s) Oral every 8 hours  nystatin Cream 1 Application(s) Topical two times a day  zinc oxide 20% Ointment 1 Application(s) Topical three times a day    MEDICATIONS  (PRN):  acetaminophen    Suspension .. 1000 milliGRAM(s) Oral every 8 hours PRN Moderate Pain (4 - 6)  dextrose 40% Gel 15 Gram(s) Oral once PRN Blood Glucose LESS THAN 70 milliGRAM(s)/deciliter  glucagon  Injectable 1 milliGRAM(s) IntraMuscular once PRN Glucose LESS THAN 70 milligrams/deciliter  lidocaine 2% Gel 1 Application(s) Topical two times a day PRN pain around PEG site  sodium chloride 0.9% lock flush 10 milliLiter(s) IV Push every 1 hour PRN Pre/post blood products, medications, blood draw, and to maintain line patency

## 2019-05-31 NOTE — PROGRESS NOTE ADULT - ATTENDING COMMENTS
CIDP vs. relapsing AIDP, doing well, on trach and awaiting placement.  also underlying post polio syndrome contributing to LE weakness    On exam, answering questions appropriately, following commands  PERRL, strong eye closure, EOMI  M- 4+/5 in arms proximally and 4/5 distally  2+/5 R leg at hip flexion, 2/5 L hip flexion  distally 0-1 in feet  areflexic    Pt is neurologically ready for discharge, however needs treatment plan in place prior to discharge.  Discussing case with primary team given difficulties in arranging treatment in care facility.  Ideally IVIG (privigen or gamunex) 0.5g/kg [id number].  subcut immunoglobulin is an alternative, although unclear whether this can be given in LTAC  If IVIG is not possible, will need PLEX, preferably 1 session q 2 weeks although 2 sessions monthly is also a possibility    F/U Dr. Sellers in clinic

## 2019-05-31 NOTE — PROGRESS NOTE ADULT - ASSESSMENT
58yo F [id number] with stage IV endometrial AdenoCA s/p staging surgery '18 and 1 round of chemotherapy 2/19 readmitted from  [facility] with urosepsis and poor respiratory status which is now thought to be secondary to diagnosis of Guillain Worthing Syndrome/AIDP.  Hospital course notable for prolonged intubation and MICU admission, transitioned to tracheostomy and PEG tube on 5/21, now weened off pressors and transferred to chronic vent unit on [facility].   Plan was for transfer to [facility]ACH, however unable to transfer due to lack of IV IG, thus in house for neurology recommendations for treatment.     1. Neuro: primary diagnosis of AIDP/GBS, s/p IVIG and plasmapheresis.  Weakness continues to improve however still with persistent respiratory weakness.  Neuro following.  plan was to continue to get IVIG [id number], however lackage of IVIG in hospital, need new recommendations for treatment at this time   - Head imaging shows likely chronic parietal infarct   2. Pulm: Tracheostomy (5/21-), tolerating short trials of CPAP, otherwise vent dependent  3. Cardio: On Florinef, midodrine decreased to 10mg TID, s/p levo gtt  4. FEN/GI: PEG 5/21- Repletion of electrolytes prn   5. : Neurogenic bladder w/ Indwelling joseph in place  When stable, consider urology consult for long term management i.e. for placement suprapubic catheter    6. ID: Afebrile currently. Switched to Linezolid 600mg BID (5/24-30), Last urine culture 5/22 reveals VRE and Sputum culture 5/22 and Bronchial wash 5/21 reveals MRSA s/p Vancomycin 1g (5/22-5/24), s/p Meropenem 5/22, s/p ertapenem ended 5/14. F/u ID recs   7. Endocrine: FS, ISS, Lantus 8u qhs (started 5/14)  8. VTE prophylaxis - Upper extremity superficial vein thrombosis noted 5/17, repeat doppler done 5/23-shows stable superficial thrombosis -Lovenox 40mg daily LE Dopplers negative   9. GYN malignancy  - s/p Anastrazole, s/p Megace 80mg BID x3 wks , s/p Tamoxifen 5/1-5/17 (stopped due to upper extremity DVT). Once patient recovers from acute neurologic issue will reassess cancer treatment options   -  Will avoid immunotherapy at this time given associated risk of autoimmune disease   - To confirm with pathology today the evaluation of tissue, evaul for endometrioid vs serous cancer   10. Derm: monitor sacrum for s/s of pressure ulcer, now stage 2- continue with zinc oxide   11. Follow up PT/OT recs   12. Social work/palliative: Pending neurology recommendations for further treatment.  DNR/DNI. Interdisciplinary meeting was done on 5/23

## 2019-05-31 NOTE — PROGRESS NOTE ADULT - ASSESSMENT
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 Today patient appears mildly improved in strength compared to exam yesterday. She was placed on CPAP trial with similar Tv of 250s.     -Given high risk of infection would not recommend plasma exchange as Central Line a source of infection  -Patient should receive Privigen or Gammunex IVIg 0.5g/kg one time every 2 weeks, end date to be determined  -Will start work to get SubQ IVIg approved as may aid in administration at LTAC facility thus allowing patient to begin rehabilitation  -Patient can follow with Dr. 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 Today patient appears mildly improved in strength compared to exam yesterday. She was placed on CPAP trial with similar Tv of 250s.     -Given high risk of infection would not recommend plasma exchange as Central Line a source of infection  -Patient should receive Privigen or Gammunex IVIg 0.5g/kg one time every 2 weeks, end date to be determined  -Patient should get SubQ IVIg if possible Sunday/Monday, would see if facility is willing to administer SubQ IVIg as it is usually a self administered medication done by patient at home  -If LTAC able will proceed with insurance auth for medication  -If patient is unable to get SubQ IVIg would substitute treatment Sunday/Monday with Plasma Exchange  -Please follow with Hematology regarding their policy with infusions and frequent central line placement; would seem too high a risk to place a line q2w or even q4w for plasma exchange for the near future  -Patient can follow with Dr. 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 Today patient appears mildly improved in strength compared to exam yesterday. She was placed on CPAP trial with similar Tv of 250s.     -Given high risk of infection would not recommend plasma exchange as Central Line a source of infection  -If possible, pt should receive Privigen or Gammunex IVIg 0.5g/kg one time every 2 weeks, end date to be determined  -If IV formulation not obtainable, then alternative is SubQ IVIg if possible Sunday/Monday, would see if facility is willing to administer SubQ IVIg as it is usually a self administered medication done by patient at home  -If LTAC able will proceed with insurance auth for medication  -If patient is unable to get SubQ IVIg would substitute treatment Sunday/Monday with Plasma Exchange, continue ideally with 1 session every 2 weeks although 2 sessions every month is also an option  -Please follow with Hematology regarding their policy with infusions and frequent central line placement; concenr regarding the risk of having to place central line q4w for plasma exchange for the near future  -Patient can follow with Dr. Sellers when she is able to from LTAC facility  -Continue attempts to wean from vent - patient MV low today, Pulm may need to be involved to find optimal vent settings as concern oxygen may be too high  -Recommend sitting up in a chair position as much as possible  -PT  -Continue supportive care   -Rest of management as per primary team

## 2019-05-31 NOTE — PROGRESS NOTE ADULT - SUBJECTIVE AND OBJECTIVE BOX
GYN Progress Note    Patient seen at bedside this morning. Feeling well this morning. Overnight reported spitting up some saliva, resolved this morning. States that she feels stiff and some right shoulder discomfort. Otherwise denies any pain. CPAP trail this morning, unable to tolerate, thus placed back on vent.  Westbrook remains in place.   Denies CP, palpitations, SOB, fever, chills.    Vital Signs Last 24 Hrs  T(C): 37.4 (31 May 2019 06:40), Max: 37.6 (30 May 2019 14:10)  T(F): 99.3 (31 May 2019 06:40), Max: 99.6 (30 May 2019 14:10)  HR: 78 (31 May 2019 07:16) (64 - 78)  BP: 146/80 (31 May 2019 06:40) (113/72 - 155/95)  BP(mean): --  RR: 18 (31 May 2019 06:40) (16 - 19)  SpO2: 100% (31 May 2019 07:16) (98% - 100%)    Physical Exam:  Gen: No Acute Distress, comfortable, conversant   Pulm: trach in place, on vent   GI: soft, nontender, nondistended, +BS, no rebound, no guarding.  Stoma in place, pink, functioning  Ext: 5/5 strength in upper extremities, 2/5 in lower extremities.  No edema or erythema in calves bilaterally, SCD in place  : Westbrook in situ, clear urine    I&O's Summary    30 May 2019 07:01  -  31 May 2019 07:00  --------------------------------------------------------  IN: 0 mL / OUT: 500 mL / NET: -500 mL      MEDICATIONS  (STANDING):  ascorbic acid 500 milliGRAM(s) Oral daily  chlorhexidine 0.12% Liquid 15 milliLiter(s) Oral Mucosa every 12 hours  chlorhexidine 2% Cloths 1 Application(s) Topical <User Schedule>  dextrose 5%. 1000 milliLiter(s) (50 mL/Hr) IV Continuous <Continuous>  dextrose 50% Injectable 25 Gram(s) IV Push once  enoxaparin Injectable 40 milliGRAM(s) SubCutaneous every 24 hours  fludroCORTISONE 0.1 milliGRAM(s) Oral every 24 hours  Heparin 1000 Units / ml 2 milliLiter(s) 2 milliLiter(s) IV Push once  insulin glargine Injectable (LANTUS) 8 Unit(s) SubCutaneous at bedtime  insulin regular  human corrective regimen sliding scale   SubCutaneous every 6 hours  midodrine 10 milliGRAM(s) Oral every 8 hours  nystatin Cream 1 Application(s) Topical two times a day  zinc oxide 20% Ointment 1 Application(s) Topical three times a day    MEDICATIONS  (PRN):  acetaminophen    Suspension .. 1000 milliGRAM(s) Oral every 8 hours PRN Moderate Pain (4 - 6)  dextrose 40% Gel 15 Gram(s) Oral once PRN Blood Glucose LESS THAN 70 milliGRAM(s)/deciliter  glucagon  Injectable 1 milliGRAM(s) IntraMuscular once PRN Glucose LESS THAN 70 milligrams/deciliter  lidocaine 2% Gel 1 Application(s) Topical two times a day PRN pain around PEG site  sodium chloride 0.9% lock flush 10 milliLiter(s) IV Push every 1 hour PRN Pre/post blood products, medications, blood draw, and to maintain line patency      LABS:

## 2019-06-01 PROCEDURE — 99233 SBSQ HOSP IP/OBS HIGH 50: CPT | Mod: GC

## 2019-06-01 RX ADMIN — Medication 1000 MILLIGRAM(S): at 07:54

## 2019-06-01 RX ADMIN — CHLORHEXIDINE GLUCONATE 1 APPLICATION(S): 213 SOLUTION TOPICAL at 06:51

## 2019-06-01 RX ADMIN — INSULIN HUMAN 2: 100 INJECTION, SOLUTION SUBCUTANEOUS at 23:10

## 2019-06-01 RX ADMIN — ENOXAPARIN SODIUM 40 MILLIGRAM(S): 100 INJECTION SUBCUTANEOUS at 21:17

## 2019-06-01 RX ADMIN — ZINC OXIDE 1 APPLICATION(S): 200 OINTMENT TOPICAL at 21:17

## 2019-06-01 RX ADMIN — ZINC OXIDE 1 APPLICATION(S): 200 OINTMENT TOPICAL at 06:51

## 2019-06-01 RX ADMIN — NYSTATIN CREAM 1 APPLICATION(S): 100000 CREAM TOPICAL at 06:51

## 2019-06-01 RX ADMIN — Medication 1000 MILLIGRAM(S): at 22:01

## 2019-06-01 RX ADMIN — MIDODRINE HYDROCHLORIDE 10 MILLIGRAM(S): 2.5 TABLET ORAL at 14:48

## 2019-06-01 RX ADMIN — CHLORHEXIDINE GLUCONATE 15 MILLILITER(S): 213 SOLUTION TOPICAL at 21:17

## 2019-06-01 RX ADMIN — MIDODRINE HYDROCHLORIDE 10 MILLIGRAM(S): 2.5 TABLET ORAL at 06:52

## 2019-06-01 RX ADMIN — MIDODRINE HYDROCHLORIDE 10 MILLIGRAM(S): 2.5 TABLET ORAL at 21:17

## 2019-06-01 RX ADMIN — CHLORHEXIDINE GLUCONATE 15 MILLILITER(S): 213 SOLUTION TOPICAL at 09:17

## 2019-06-01 RX ADMIN — Medication 500 MILLIGRAM(S): at 12:10

## 2019-06-01 RX ADMIN — INSULIN GLARGINE 8 UNIT(S): 100 INJECTION, SOLUTION SUBCUTANEOUS at 23:10

## 2019-06-01 RX ADMIN — NYSTATIN CREAM 1 APPLICATION(S): 100000 CREAM TOPICAL at 17:59

## 2019-06-01 RX ADMIN — Medication 1000 MILLIGRAM(S): at 21:17

## 2019-06-01 RX ADMIN — FLUDROCORTISONE ACETATE 0.1 MILLIGRAM(S): 0.1 TABLET ORAL at 06:52

## 2019-06-01 RX ADMIN — ZINC OXIDE 1 APPLICATION(S): 200 OINTMENT TOPICAL at 14:48

## 2019-06-01 RX ADMIN — Medication 1000 MILLIGRAM(S): at 06:52

## 2019-06-01 NOTE — PROGRESS NOTE ADULT - SUBJECTIVE AND OBJECTIVE BOX
GYN PROGRESS NOTE    Patient evaluated at the bedside. No acute events. Patient denies complaints at this time. She reports that she slept well.   Denies CP/SOB/dizziness/nausea/vomiting/abdominal pain/calf pain. Joseph in place.     O:   T(C): 37 (06-01-19 @ 06:39), Max: 37.3 (05-31-19 @ 21:15)  HR: 65 (06-01-19 @ 06:48) (60 - 76)  BP: 113/70 (06-01-19 @ 06:39) (113/70 - 136/79)  RR: 19 (06-01-19 @ 01:02) (13 - 19)  SpO2: 100% (06-01-19 @ 09:36) (99% - 100%)  Wt(kg): --    GEN: patient appears well  LUNGS: no respiratory distress  ABD: soft, nontender, ostomy appliance in place with liquid stool in bag   Pelvic: joseph draining yellow, cloudy urine   EXT: no calf tenderness        05-31 @ 07:01  -  06-01 @ 07:00  --------------------------------------------------------  IN: 0 mL / OUT: 650 mL / NET: -650 mL

## 2019-06-01 NOTE — PROGRESS NOTE ADULT - PROBLEM SELECTOR PLAN 3
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 s/p 4 rounds of IVIG and 5 rounds of plasmapheresis (completed 5/21). 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  - C/w plan as per above problem 1 & 2  - Start 0.5g/kg of IVIG every 2 weeks starting 6/3. will need either privigen or gammunex  - Neurology on board trying to help coordinate IVIG administration Patient w AIDP leading to respiratory compromise, previous EMG w demyelinating polyneuropathy. s/p 4 rounds of IVIG and 5 rounds of plasmapheresis (completed 5/21). 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  - C/w plan as per above problem 1 & 2  - Start 0.5g/kg of IVIG every 2 weeks starting 6/3 if can get it. will need either privigen or gammunex  - Neurology on board trying to help coordinate IVIG administration vs plasmapheresis on monday

## 2019-06-01 NOTE — PROGRESS NOTE ADULT - ASSESSMENT
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  Hospital course notable for prolonged intubation and MICU admission, transitioned to tracheostomy and PEG tube on 5/21, now weened off pressors and transferred to chronic vent unit on [facility].   Plan was for transfer to LTACH, however unable to transfer due to lack of IV IG, thus in house for neurology recommendations for treatment.     1. Neuro: primary diagnosis of AIDP/GBS, s/p IVIG and plasmapheresis.  Weakness continues to improve however still with persistent respiratory weakness.  Neuro following.  plan was to continue to get IVIG [id number], however lackage of IVIG in hospital, need new recommendations for treatment at this time   - Head imaging shows likely chronic parietal infarct   2. Pulm: Tracheostomy (5/21-), tolerating short trials of CPAP, otherwise vent dependent  3. Cardio: On Florinef, midodrine decreased to 10mg TID, s/p levo gtt  4. FEN/GI: PEG 5/21- Repletion of electrolytes prn   5. : Neurogenic bladder w/ Indwelling joseph in place  When stable, consider urology consult for long term management i.e. for placement suprapubic catheter    6. ID: Afebrile currently. Switched to Linezolid 600mg BID (5/24-30), Last urine culture 5/22 reveals VRE and Sputum culture 5/22 and Bronchial wash 5/21 reveals MRSA s/p Vancomycin 1g (5/22-5/24), s/p Meropenem 5/22, s/p ertapenem ended 5/14. F/u ID recs   7. Endocrine: FS, ISS, Lantus 8u qhs (started 5/14)  8. VTE prophylaxis - Upper extremity superficial vein thrombosis noted 5/17, repeat doppler done 5/23-shows stable superficial thrombosis -Lovenox 40mg daily LE Dopplers negative   9. GYN malignancy  - s/p Anastrazole, s/p Megace 80mg BID x3 wks , s/p Tamoxifen 5/1-5/17 (stopped due to upper extremity DVT). Once patient recovers from acute neurologic issue will reassess cancer treatment options   -  Will avoid immunotherapy at this time given associated risk of autoimmune disease   - To confirm with pathology today the evaluation of tissue, evaul for endometrioid vs serous cancer   10. Derm: monitor sacrum for s/s of pressure ulcer, now stage 2- continue with zinc oxide   11. Follow up PT/OT recs   12. Social work/palliative: Pending neurology recommendations for further treatment.  DNR/DNI. Interdisciplinary meeting was done on 5/23

## 2019-06-01 NOTE — PROGRESS NOTE ADULT - ASSESSMENT
60 yo F PMHx polio, breast cancer, DM, Endometrial Cancer s/p exploratory laparotomy, enterolysis, SHAMIR, BSO, pelvic lymphadenectomy, low anterior resection mobilization of splenic flexure, end colostomy on 7/30/18, rectovaginal fistula, numerous admissions for urinary tract infection presented to [facility] in the setting of urosepsis. Hospital course complicated by ESBL E coli bacteremia (completed ertapenem on 5/14 ) and respiratory failure requiring multiple intubations 2/2 AIDP, now s/p trach and PEG (5/21), s/p treatment for sepsis 2/2 MRSA PNA w/ linezolid, now pending placement to facility to give IVIG.

## 2019-06-01 NOTE — PROGRESS NOTE ADULT - ATTENDING COMMENTS
Pt. seen and examined by me earlier today; I have read Dr. Vincent's note, I agree w/ his findings and plan of care as documented

## 2019-06-01 NOTE — PROGRESS NOTE ADULT - SUBJECTIVE AND OBJECTIVE BOX
OVERNIGHT EVENTS:    SUBJECTIVE:    Vital Signs Last 12 Hrs  T(F): 99.1 (05-31-19 @ 21:15), Max: 99.1 (05-31-19 @ 21:15)  HR: 65 (06-01-19 @ 01:02) (65 - 68)  BP: 114/64 (05-31-19 @ 21:15) (114/64 - 114/64)  BP(mean): --  RR: 19 (06-01-19 @ 01:02) (16 - 19)  SpO2: 100% (06-01-19 @ 01:02) (100% - 100%)  I&O's Summary    30 May 2019 07:01  -  31 May 2019 07:00  --------------------------------------------------------  IN: 0 mL / OUT: 500 mL / NET: -500 mL        PHYSICAL EXAM:  Constitutional: NAD, comfortable in bed.  HEENT: NC/AT, PERRLA, EOMI, no conjunctival pallor or scleral icterus, MMM  Neck: Supple, no JVD, tracheostomy in place CDI  Respiratory: on ACVC, clear lungs  Cardiovascular: RRR, normal S1 and S2, no m/r/g.   Gastrointestinal: +BS, soft NTND, no guarding or rebound tenderness, no palpable masses, PEG in place - no signs of infxn  Extremities: wwp; no cyanosis, clubbing or edema.   Vascular: Pulses equal and strong throughout, R PICC line, R chest chemoport  Neurological: AAOx3, no CN deficits, strength 5/5 in UEs and 3/5 in LEs, sensation intact throughout.       LABS:                RADIOLOGY & ADDITIONAL TESTS:    MEDICATIONS  (STANDING):  ascorbic acid 500 milliGRAM(s) Oral daily  chlorhexidine 0.12% Liquid 15 milliLiter(s) Oral Mucosa every 12 hours  chlorhexidine 2% Cloths 1 Application(s) Topical <User Schedule>  dextrose 5%. 1000 milliLiter(s) (50 mL/Hr) IV Continuous <Continuous>  dextrose 50% Injectable 25 Gram(s) IV Push once  enoxaparin Injectable 40 milliGRAM(s) SubCutaneous every 24 hours  fludroCORTISONE 0.1 milliGRAM(s) Oral every 24 hours  Heparin 1000 Units / ml 2 milliLiter(s) 2 milliLiter(s) IV Push once  insulin glargine Injectable (LANTUS) 8 Unit(s) SubCutaneous at bedtime  insulin regular  human corrective regimen sliding scale   SubCutaneous every 6 hours  midodrine 10 milliGRAM(s) Oral every 8 hours  nystatin Cream 1 Application(s) Topical two times a day  zinc oxide 20% Ointment 1 Application(s) Topical three times a day    MEDICATIONS  (PRN):  acetaminophen    Suspension .. 1000 milliGRAM(s) Oral every 8 hours PRN Moderate Pain (4 - 6)  dextrose 40% Gel 15 Gram(s) Oral once PRN Blood Glucose LESS THAN 70 milliGRAM(s)/deciliter  glucagon  Injectable 1 milliGRAM(s) IntraMuscular once PRN Glucose LESS THAN 70 milligrams/deciliter  lidocaine 2% Gel 1 Application(s) Topical two times a day PRN pain around PEG site  sodium chloride 0.9% lock flush 10 milliLiter(s) IV Push every 1 hour PRN Pre/post blood products, medications, blood draw, and to maintain line patency OVERNIGHT EVENTS: KESHAWN    SUBJECTIVE: Patient feels well this morning. apnea alarm set off on cpap trial.     Vital Signs Last 12 Hrs  T(F): 99.1 (05-31-19 @ 21:15), Max: 99.1 (05-31-19 @ 21:15)  HR: 65 (06-01-19 @ 01:02) (65 - 68)  BP: 114/64 (05-31-19 @ 21:15) (114/64 - 114/64)  BP(mean): --  RR: 19 (06-01-19 @ 01:02) (16 - 19)  SpO2: 100% (06-01-19 @ 01:02) (100% - 100%)  I&O's Summary    30 May 2019 07:01  -  31 May 2019 07:00  --------------------------------------------------------  IN: 0 mL / OUT: 500 mL / NET: -500 mL        PHYSICAL EXAM:  Constitutional: NAD, comfortable in bed.  HEENT: NC/AT, PERRLA, EOMI, no conjunctival pallor or scleral icterus, MMM  Neck: Supple, no JVD, tracheostomy in place CDI  Respiratory: on ACVC, clear lungs  Cardiovascular: RRR, normal S1 and S2, no m/r/g.   Gastrointestinal: +BS, soft NTND, no guarding or rebound tenderness, no palpable masses, PEG in place - no signs of infxn  Extremities: wwp; no cyanosis, clubbing or edema.   Vascular: Pulses equal and strong throughout, R PICC line, R chest chemoport  Neurological: AAOx3, no CN deficits, strength 5/5 in UEs and 3/5 in LEs, sensation intact throughout.       LABS:                RADIOLOGY & ADDITIONAL TESTS:    MEDICATIONS  (STANDING):  ascorbic acid 500 milliGRAM(s) Oral daily  chlorhexidine 0.12% Liquid 15 milliLiter(s) Oral Mucosa every 12 hours  chlorhexidine 2% Cloths 1 Application(s) Topical <User Schedule>  dextrose 5%. 1000 milliLiter(s) (50 mL/Hr) IV Continuous <Continuous>  dextrose 50% Injectable 25 Gram(s) IV Push once  enoxaparin Injectable 40 milliGRAM(s) SubCutaneous every 24 hours  fludroCORTISONE 0.1 milliGRAM(s) Oral every 24 hours  Heparin 1000 Units / ml 2 milliLiter(s) 2 milliLiter(s) IV Push once  insulin glargine Injectable (LANTUS) 8 Unit(s) SubCutaneous at bedtime  insulin regular  human corrective regimen sliding scale   SubCutaneous every 6 hours  midodrine 10 milliGRAM(s) Oral every 8 hours  nystatin Cream 1 Application(s) Topical two times a day  zinc oxide 20% Ointment 1 Application(s) Topical three times a day    MEDICATIONS  (PRN):  acetaminophen    Suspension .. 1000 milliGRAM(s) Oral every 8 hours PRN Moderate Pain (4 - 6)  dextrose 40% Gel 15 Gram(s) Oral once PRN Blood Glucose LESS THAN 70 milliGRAM(s)/deciliter  glucagon  Injectable 1 milliGRAM(s) IntraMuscular once PRN Glucose LESS THAN 70 milligrams/deciliter  lidocaine 2% Gel 1 Application(s) Topical two times a day PRN pain around PEG site  sodium chloride 0.9% lock flush 10 milliLiter(s) IV Push every 1 hour PRN Pre/post blood products, medications, blood draw, and to maintain line patency

## 2019-06-01 NOTE — PROGRESS NOTE ADULT - PROBLEM SELECTOR PLAN 4
Pt spiked to 101.6  on 5/22. UA  positive, UCx growing Enterococcus and Bronch wash growing MRSA; Was on vancomycin ( 5/22- 5/24) d/c due to Cx positive for VRE.  - s/p linezolid for a total of 7 days (5/24 - 5/30)   - BCx 5/22 NGTD Resolved  Pt spiked to 101.6  on 5/22. UA  positive, UCx growing Enterococcus and Bronch wash growing MRSA; Was on vancomycin ( 5/22- 5/24) d/c due to Cx positive for VRE.  - s/p linezolid for a total of 7 days (5/24 - 5/30)   - BCx 5/22 NGTD

## 2019-06-02 DIAGNOSIS — Z93.1 GASTROSTOMY STATUS: ICD-10-CM

## 2019-06-02 PROCEDURE — 99233 SBSQ HOSP IP/OBS HIGH 50: CPT

## 2019-06-02 RX ORDER — MORPHINE SULFATE 50 MG/1
2 CAPSULE, EXTENDED RELEASE ORAL ONCE
Refills: 0 | Status: DISCONTINUED | OUTPATIENT
Start: 2019-06-02 | End: 2019-06-19

## 2019-06-02 RX ADMIN — INSULIN GLARGINE 8 UNIT(S): 100 INJECTION, SOLUTION SUBCUTANEOUS at 22:29

## 2019-06-02 RX ADMIN — Medication 1000 MILLIGRAM(S): at 23:21

## 2019-06-02 RX ADMIN — CHLORHEXIDINE GLUCONATE 15 MILLILITER(S): 213 SOLUTION TOPICAL at 11:47

## 2019-06-02 RX ADMIN — INSULIN HUMAN 2: 100 INJECTION, SOLUTION SUBCUTANEOUS at 12:27

## 2019-06-02 RX ADMIN — Medication 1000 MILLIGRAM(S): at 05:49

## 2019-06-02 RX ADMIN — MIDODRINE HYDROCHLORIDE 10 MILLIGRAM(S): 2.5 TABLET ORAL at 22:30

## 2019-06-02 RX ADMIN — FLUDROCORTISONE ACETATE 0.1 MILLIGRAM(S): 0.1 TABLET ORAL at 06:49

## 2019-06-02 RX ADMIN — INSULIN HUMAN 2: 100 INJECTION, SOLUTION SUBCUTANEOUS at 06:57

## 2019-06-02 RX ADMIN — Medication 500 MILLIGRAM(S): at 12:04

## 2019-06-02 RX ADMIN — CHLORHEXIDINE GLUCONATE 15 MILLILITER(S): 213 SOLUTION TOPICAL at 22:30

## 2019-06-02 RX ADMIN — NYSTATIN CREAM 1 APPLICATION(S): 100000 CREAM TOPICAL at 06:50

## 2019-06-02 RX ADMIN — ZINC OXIDE 1 APPLICATION(S): 200 OINTMENT TOPICAL at 14:28

## 2019-06-02 RX ADMIN — ZINC OXIDE 1 APPLICATION(S): 200 OINTMENT TOPICAL at 22:30

## 2019-06-02 RX ADMIN — NYSTATIN CREAM 1 APPLICATION(S): 100000 CREAM TOPICAL at 17:23

## 2019-06-02 RX ADMIN — MIDODRINE HYDROCHLORIDE 10 MILLIGRAM(S): 2.5 TABLET ORAL at 14:28

## 2019-06-02 RX ADMIN — ZINC OXIDE 1 APPLICATION(S): 200 OINTMENT TOPICAL at 06:50

## 2019-06-02 RX ADMIN — Medication 1000 MILLIGRAM(S): at 22:30

## 2019-06-02 RX ADMIN — Medication 1000 MILLIGRAM(S): at 06:50

## 2019-06-02 RX ADMIN — MIDODRINE HYDROCHLORIDE 10 MILLIGRAM(S): 2.5 TABLET ORAL at 06:50

## 2019-06-02 RX ADMIN — CHLORHEXIDINE GLUCONATE 1 APPLICATION(S): 213 SOLUTION TOPICAL at 06:54

## 2019-06-02 NOTE — PROGRESS NOTE ADULT - ASSESSMENT
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  Hospital course notable for prolonged intubation and MICU admission, transitioned to tracheostomy and PEG tube on 5/21, now weened off pressors and transferred to chronic vent unit on [facility].   Plan was for transfer to LTACH, however unable to transfer due to lack of IV IG, thus in house for neurology recommendations for treatment.     1. Neuro: primary diagnosis of AIDP/GBS, s/p IVIG and plasmapheresis.  Weakness continues to improve however still with persistent respiratory weakness.  Neuro following.  plan was to continue to get IVIG [id number], however lackage of IVIG in hospital, need new recommendations for treatment at this time   - Head imaging shows likely chronic parietal infarct   2. Pulm: Tracheostomy (5/21-), tolerating short trials of CPAP, otherwise vent dependent  3. Cardio: On Florinef, midodrine decreased to 10mg TID, s/p levo gtt  4. FEN/GI: PEG 5/21- Repletion of electrolytes prn   5. : Neurogenic bladder w/ Indwelling joseph in place  When stable, consider urology consult for long term management i.e. for placement suprapubic catheter  6. ID: Afebrile currently. Switched to Linezolid 600mg BID (5/24-30), Last urine culture 5/22 reveals VRE and Sputum culture 5/22 and Bronchial wash 5/21 reveals MRSA s/p Vancomycin 1g (5/22-5/24), s/p Meropenem 5/22, s/p ertapenem ended 5/14. F/u ID recs   7. Endocrine: FS, ISS, Lantus 8u qhs (started 5/14)  8. VTE prophylaxis - Upper extremity superficial vein thrombosis noted 5/17, repeat doppler done 5/23-shows stable superficial thrombosis -Lovenox 40mg daily LE Dopplers negative   9. GYN malignancy  - s/p Anastrazole, s/p Megace 80mg BID x3 wks , s/p Tamoxifen 5/1-5/17 (stopped due to upper extremity DVT). Once patient recovers from acute neurologic issue will reassess cancer treatment options   -  Will avoid immunotherapy at this time given associated risk of autoimmune disease   - To confirm with pathology today the evaluation of tissue, evaul for endometrioid vs serous cancer   10. Derm: monitor sacrum for s/s of pressure ulcer, now stage 2- continue with zinc oxide   11. Follow up PT/OT recs   12. Social work/palliative: Pending neurology recommendations for further treatment.  DNR/DNI. Interdisciplinary meeting was done on 5/23

## 2019-06-02 NOTE — PROGRESS NOTE ADULT - PROBLEM SELECTOR PLAN 1
POA, d/t UTI d/t ESBL E. coli c/b bacteremia and shock; course also c/b MSSA PNA -- resolved, s/p abx

## 2019-06-02 NOTE — PROGRESS NOTE ADULT - SUBJECTIVE AND OBJECTIVE BOX
Patient is a 59y old  Female who presents with a chief complaint of sepsis (02 Jun 2019 06:37)      INTERVAL HPI/OVERNIGHT EVENTS:    Pt. seen and examined at 9:30AM  Pt. has no complaints    Review of Systems: 12 point review of systems otherwise negative    MEDICATIONS  (STANDING):  ascorbic acid 500 milliGRAM(s) Oral daily  chlorhexidine 0.12% Liquid 15 milliLiter(s) Oral Mucosa every 12 hours  chlorhexidine 2% Cloths 1 Application(s) Topical <User Schedule>  dextrose 5%. 1000 milliLiter(s) (50 mL/Hr) IV Continuous <Continuous>  dextrose 50% Injectable 25 Gram(s) IV Push once  fludroCORTISONE 0.1 milliGRAM(s) Oral every 24 hours  Heparin 1000 Units / ml 2 milliLiter(s) 2 milliLiter(s) IV Push once  insulin glargine Injectable (LANTUS) 8 Unit(s) SubCutaneous at bedtime  insulin regular  human corrective regimen sliding scale   SubCutaneous every 6 hours  midodrine 10 milliGRAM(s) Oral every 8 hours  morphine  - Injectable 2 milliGRAM(s) IV Push once  nystatin Cream 1 Application(s) Topical two times a day  zinc oxide 20% Ointment 1 Application(s) Topical three times a day    MEDICATIONS  (PRN):  acetaminophen    Suspension .. 1000 milliGRAM(s) Oral every 8 hours PRN Moderate Pain (4 - 6)  dextrose 40% Gel 15 Gram(s) Oral once PRN Blood Glucose LESS THAN 70 milliGRAM(s)/deciliter  glucagon  Injectable 1 milliGRAM(s) IntraMuscular once PRN Glucose LESS THAN 70 milligrams/deciliter  lidocaine 2% Gel 1 Application(s) Topical two times a day PRN pain around PEG site  sodium chloride 0.9% lock flush 10 milliLiter(s) IV Push every 1 hour PRN Pre/post blood products, medications, blood draw, and to maintain line patency      Allergies    No Known Allergies    Intolerances    IVIG PRODUCT IS PRIGIVEN OR GAMMUNEX (Unknown)        Vital Signs Last 24 Hrs  T(C): 36.8 (02 Jun 2019 08:42), Max: 37.7 (01 Jun 2019 20:54)  T(F): 98.2 (02 Jun 2019 08:42), Max: 99.8 (01 Jun 2019 20:54)  HR: 84 (02 Jun 2019 08:42) (69 - 89)  BP: 116/70 (02 Jun 2019 08:42) (108/71 - 144/72)  BP(mean): --  RR: 18 (02 Jun 2019 08:42) (17 - 18)  SpO2: 99% (02 Jun 2019 12:40) (99% - 100%)  CAPILLARY BLOOD GLUCOSE      POCT Blood Glucose.: 163 mg/dL (02 Jun 2019 12:17)  POCT Blood Glucose.: 184 mg/dL (02 Jun 2019 06:54)  POCT Blood Glucose.: 171 mg/dL (01 Jun 2019 23:03)  POCT Blood Glucose.: 146 mg/dL (01 Jun 2019 17:42)      06-01 @ 07:01  -  06-02 @ 07:00  --------------------------------------------------------  IN: 0 mL / OUT: 650 mL / NET: -650 mL    06-02 @ 07:01 - 06-02 @ 15:50  --------------------------------------------------------  IN: 0 mL / OUT: 275 mL / NET: -275 mL        Physical Exam:  (at 9:30AM)  Daily     Daily   General:  chronically-ill appearing in NAD  HEENT:  +trach   Abdomen:  +PEG  Skin:  WWP  Neuro:  AAOx3    LABS:                  RADIOLOGY & ADDITIONAL TESTS:    ---------------------------------------------------------------------------  I personally reviewed: [  ]EKG   [  ]CXR    [  ] CT    [  ]Other  ---------------------------------------------------------------------------  PLEASE CHECK WHEN PRESENT:     [  ]Heart Failure     [  ] Acute     [  ] Acute on Chronic     [  ] Chronic  -------------------------------------------------------------------     [  ]Diastolic [HFpEF]     [  ]Systolic [HFrEF]     [  ]Combined [HFpEF & HFrEF]     [  ]Other:  -------------------------------------------------------------------  [  ]ABHAY     [  ]ATN     [  ]Reneal Medullary Necrosis     [  ]Renal Cortical Necrosis     [  ]Other Pathological Lesions:    [  ]CKD 1  [  ]CKD 2  [  ]CKD 3  [  ]CKD 4  [  ]CKD 5  [  ]Other  -------------------------------------------------------------------  [  ]Other/Unspecified:    --------------------------------------------------------------------    Abdominal Nutritional Status  [  ]Malnutrition: See Nutrition Note  [  ]Cachexia  [  ]Other:   [  ]Supplement Ordered:  [  ]Morbid Obesity (BMI >=40]

## 2019-06-02 NOTE — PROGRESS NOTE ADULT - SUBJECTIVE AND OBJECTIVE BOX
GYN PROGRESS NOTE    Pt seen and examined at bedside. She was sleeping comfortably upon evaluation.  KESHAWN Joseph in place.    T(F): 98.4 (06-02-19 @ 05:27), Max: 99.8 (06-01-19 @ 20:54)  HR: 89 (06-02-19 @ 05:27) (65 - 89)  BP: 108/71 (06-02-19 @ 05:27) (108/71 - 144/72)  RR: 18 (06-02-19 @ 05:27) (17 - 18)  SpO2: 100% (06-02-19 @ 05:27) (98% - 100%)  Wt(kg): --  I&O's Summary    31 May 2019 07:01  -  01 Jun 2019 07:00  --------------------------------------------------------  IN: 0 mL / OUT: 650 mL / NET: -650 mL    01 Jun 2019 07:01  -  02 Jun 2019 06:37  --------------------------------------------------------  IN: 0 mL / OUT: 650 mL / NET: -650 mL        MEDICATIONS  (STANDING):  ascorbic acid 500 milliGRAM(s) Oral daily  chlorhexidine 0.12% Liquid 15 milliLiter(s) Oral Mucosa every 12 hours  chlorhexidine 2% Cloths 1 Application(s) Topical <User Schedule>  dextrose 5%. 1000 milliLiter(s) (50 mL/Hr) IV Continuous <Continuous>  dextrose 50% Injectable 25 Gram(s) IV Push once  enoxaparin Injectable 40 milliGRAM(s) SubCutaneous every 24 hours  fludroCORTISONE 0.1 milliGRAM(s) Oral every 24 hours  Heparin 1000 Units / ml 2 milliLiter(s) 2 milliLiter(s) IV Push once  insulin glargine Injectable (LANTUS) 8 Unit(s) SubCutaneous at bedtime  insulin regular  human corrective regimen sliding scale   SubCutaneous every 6 hours  midodrine 10 milliGRAM(s) Oral every 8 hours  morphine  - Injectable 2 milliGRAM(s) IV Push once  nystatin Cream 1 Application(s) Topical two times a day  zinc oxide 20% Ointment 1 Application(s) Topical three times a day    MEDICATIONS  (PRN):  acetaminophen    Suspension .. 1000 milliGRAM(s) Oral every 8 hours PRN Moderate Pain (4 - 6)  dextrose 40% Gel 15 Gram(s) Oral once PRN Blood Glucose LESS THAN 70 milliGRAM(s)/deciliter  glucagon  Injectable 1 milliGRAM(s) IntraMuscular once PRN Glucose LESS THAN 70 milligrams/deciliter  lidocaine 2% Gel 1 Application(s) Topical two times a day PRN pain around PEG site  sodium chloride 0.9% lock flush 10 milliLiter(s) IV Push every 1 hour PRN Pre/post blood products, medications, blood draw, and to maintain line patency      GEN: patient appears well  LUNGS: no respiratory distress  Pelvic: joseph draining yellow, cloudy urine

## 2019-06-03 LAB
ANION GAP SERPL CALC-SCNC: 12 MMOL/L — SIGNIFICANT CHANGE UP (ref 5–17)
APTT BLD: 27.1 SEC — LOW (ref 27.5–36.3)
BUN SERPL-MCNC: 19 MG/DL — SIGNIFICANT CHANGE UP (ref 7–23)
CALCIUM SERPL-MCNC: 9.5 MG/DL — SIGNIFICANT CHANGE UP (ref 8.4–10.5)
CHLORIDE SERPL-SCNC: 98 MMOL/L — SIGNIFICANT CHANGE UP (ref 96–108)
CO2 SERPL-SCNC: 29 MMOL/L — SIGNIFICANT CHANGE UP (ref 22–31)
CREAT SERPL-MCNC: 0.46 MG/DL — LOW (ref 0.5–1.3)
GLUCOSE SERPL-MCNC: 131 MG/DL — HIGH (ref 70–99)
HCT VFR BLD CALC: 21.6 % — LOW (ref 34.5–45)
HCT VFR BLD CALC: 22.8 % — LOW (ref 34.5–45)
HGB BLD-MCNC: 6.7 G/DL — CRITICAL LOW (ref 11.5–15.5)
HGB BLD-MCNC: 6.9 G/DL — CRITICAL LOW (ref 11.5–15.5)
INR BLD: 1.08 — SIGNIFICANT CHANGE UP (ref 0.88–1.16)
MAGNESIUM SERPL-MCNC: 1.8 MG/DL — SIGNIFICANT CHANGE UP (ref 1.6–2.6)
MCHC RBC-ENTMCNC: 28 PG — SIGNIFICANT CHANGE UP (ref 27–34)
MCHC RBC-ENTMCNC: 28.6 PG — SIGNIFICANT CHANGE UP (ref 27–34)
MCHC RBC-ENTMCNC: 30.3 GM/DL — LOW (ref 32–36)
MCHC RBC-ENTMCNC: 31 GM/DL — LOW (ref 32–36)
MCV RBC AUTO: 92.3 FL — SIGNIFICANT CHANGE UP (ref 80–100)
MCV RBC AUTO: 92.7 FL — SIGNIFICANT CHANGE UP (ref 80–100)
NRBC # BLD: 0 /100 WBCS — SIGNIFICANT CHANGE UP (ref 0–0)
NRBC # BLD: 0 /100 WBCS — SIGNIFICANT CHANGE UP (ref 0–0)
PHOSPHATE SERPL-MCNC: 4.6 MG/DL — HIGH (ref 2.5–4.5)
PLATELET # BLD AUTO: 141 K/UL — LOW (ref 150–400)
PLATELET # BLD AUTO: 153 K/UL — SIGNIFICANT CHANGE UP (ref 150–400)
POTASSIUM SERPL-MCNC: 3.4 MMOL/L — LOW (ref 3.5–5.3)
POTASSIUM SERPL-SCNC: 3.4 MMOL/L — LOW (ref 3.5–5.3)
PROTHROM AB SERPL-ACNC: 12.2 SEC — SIGNIFICANT CHANGE UP (ref 10–12.9)
RBC # BLD: 2.34 M/UL — LOW (ref 3.8–5.2)
RBC # BLD: 2.46 M/UL — LOW (ref 3.8–5.2)
RBC # FLD: 15.5 % — HIGH (ref 10.3–14.5)
RBC # FLD: 15.7 % — HIGH (ref 10.3–14.5)
SODIUM SERPL-SCNC: 139 MMOL/L — SIGNIFICANT CHANGE UP (ref 135–145)
WBC # BLD: 10.94 K/UL — HIGH (ref 3.8–10.5)
WBC # BLD: 8.79 K/UL — SIGNIFICANT CHANGE UP (ref 3.8–10.5)
WBC # FLD AUTO: 10.94 K/UL — HIGH (ref 3.8–10.5)
WBC # FLD AUTO: 8.79 K/UL — SIGNIFICANT CHANGE UP (ref 3.8–10.5)

## 2019-06-03 PROCEDURE — 99233 SBSQ HOSP IP/OBS HIGH 50: CPT

## 2019-06-03 PROCEDURE — 99233 SBSQ HOSP IP/OBS HIGH 50: CPT | Mod: GC

## 2019-06-03 PROCEDURE — 71045 X-RAY EXAM CHEST 1 VIEW: CPT | Mod: 26

## 2019-06-03 RX ORDER — ALBUMIN HUMAN 25 %
2750 VIAL (ML) INTRAVENOUS ONCE
Refills: 0 | Status: DISCONTINUED | OUTPATIENT
Start: 2019-06-04 | End: 2019-06-19

## 2019-06-03 RX ORDER — INSULIN GLARGINE 100 [IU]/ML
4 INJECTION, SOLUTION SUBCUTANEOUS AT BEDTIME
Refills: 0 | Status: DISCONTINUED | OUTPATIENT
Start: 2019-06-03 | End: 2019-06-04

## 2019-06-03 RX ORDER — SODIUM CHLORIDE 9 MG/ML
1000 INJECTION, SOLUTION INTRAVENOUS
Refills: 0 | Status: DISCONTINUED | OUTPATIENT
Start: 2019-06-03 | End: 2019-06-05

## 2019-06-03 RX ORDER — CALCIUM GLUCONATE 100 MG/ML
1 VIAL (ML) INTRAVENOUS ONCE
Refills: 0 | Status: DISCONTINUED | OUTPATIENT
Start: 2019-06-04 | End: 2019-06-19

## 2019-06-03 RX ORDER — ACETYLCYSTEINE 200 MG/ML
4 VIAL (ML) MISCELLANEOUS THREE TIMES A DAY
Refills: 0 | Status: DISCONTINUED | OUTPATIENT
Start: 2019-06-03 | End: 2019-06-04

## 2019-06-03 RX ORDER — POTASSIUM CHLORIDE 20 MEQ
60 PACKET (EA) ORAL ONCE
Refills: 0 | Status: COMPLETED | OUTPATIENT
Start: 2019-06-03 | End: 2019-06-03

## 2019-06-03 RX ORDER — IOHEXOL 300 MG/ML
30 INJECTION, SOLUTION INTRAVENOUS ONCE
Refills: 0 | Status: COMPLETED | OUTPATIENT
Start: 2019-06-03 | End: 2019-06-03

## 2019-06-03 RX ADMIN — Medication 1000 MILLIGRAM(S): at 06:52

## 2019-06-03 RX ADMIN — Medication 1000 MILLIGRAM(S): at 07:47

## 2019-06-03 RX ADMIN — Medication 1000 MILLIGRAM(S): at 23:31

## 2019-06-03 RX ADMIN — FLUDROCORTISONE ACETATE 0.1 MILLIGRAM(S): 0.1 TABLET ORAL at 06:53

## 2019-06-03 RX ADMIN — CHLORHEXIDINE GLUCONATE 15 MILLILITER(S): 213 SOLUTION TOPICAL at 09:29

## 2019-06-03 RX ADMIN — INSULIN GLARGINE 4 UNIT(S): 100 INJECTION, SOLUTION SUBCUTANEOUS at 23:38

## 2019-06-03 RX ADMIN — NYSTATIN CREAM 1 APPLICATION(S): 100000 CREAM TOPICAL at 06:53

## 2019-06-03 RX ADMIN — SODIUM CHLORIDE 80 MILLILITER(S): 9 INJECTION, SOLUTION INTRAVENOUS at 17:31

## 2019-06-03 RX ADMIN — ZINC OXIDE 1 APPLICATION(S): 200 OINTMENT TOPICAL at 23:39

## 2019-06-03 RX ADMIN — ZINC OXIDE 1 APPLICATION(S): 200 OINTMENT TOPICAL at 15:57

## 2019-06-03 RX ADMIN — MIDODRINE HYDROCHLORIDE 10 MILLIGRAM(S): 2.5 TABLET ORAL at 23:39

## 2019-06-03 RX ADMIN — NYSTATIN CREAM 1 APPLICATION(S): 100000 CREAM TOPICAL at 17:33

## 2019-06-03 RX ADMIN — MIDODRINE HYDROCHLORIDE 10 MILLIGRAM(S): 2.5 TABLET ORAL at 06:52

## 2019-06-03 RX ADMIN — CHLORHEXIDINE GLUCONATE 15 MILLILITER(S): 213 SOLUTION TOPICAL at 23:39

## 2019-06-03 RX ADMIN — IOHEXOL 30 MILLILITER(S): 300 INJECTION, SOLUTION INTRAVENOUS at 17:32

## 2019-06-03 RX ADMIN — Medication 60 MILLIEQUIVALENT(S): at 09:30

## 2019-06-03 RX ADMIN — CHLORHEXIDINE GLUCONATE 1 APPLICATION(S): 213 SOLUTION TOPICAL at 06:53

## 2019-06-03 RX ADMIN — ZINC OXIDE 1 APPLICATION(S): 200 OINTMENT TOPICAL at 06:53

## 2019-06-03 NOTE — PROGRESS NOTE ADULT - PROBLEM SELECTOR PLAN 4
Resolved  Pt spiked to 101.6  on 5/22. UA  positive, UCx growing Enterococcus and Bronch wash growing MRSA; Was on vancomycin ( 5/22- 5/24) d/c due to Cx positive for VRE.  - s/p linezolid for a total of 7 days (5/24 - 5/30)   - BCx 5/22 NGTD

## 2019-06-03 NOTE — PROGRESS NOTE ADULT - ATTENDING COMMENTS
this patient has a chronic neurological condition that transiently responds to TPE. Her weakness today, however, may be exacerbated by her anemia, and she was transfused with plans to reassess tomorrow. Neurology has advised TPE now for maintenance. If this is considered as a long term treatment plan, she will need a permanent catheter placed. Please note that this brings in the risk of thrombosis and infection around an indwelling catheter site. For now, she will have a catheter placed and TPE to be done tomorrow and Thursday. She will not need a full five day treatment. Please follow her CBC, PT, aPTT and fibrinogen.

## 2019-06-03 NOTE — PROGRESS NOTE ADULT - ASSESSMENT
58 yo F PMHx polio, breast cancer, DM, Endometrial Cancer s/p exploratory laparotomy, enterolysis, SHAMIR, BSO, pelvic lymphadenectomy, low anterior resection mobilization of splenic flexure, end colostomy on 7/30/18, rectovaginal fistula, numerous admissions for urinary tract infection presented to [facility] in the setting of urosepsis. Hospital course complicated by ESBL E coli bacteremia (completed ertapenem on 5/14 ) and respiratory failure requiring multiple intubations 2/2 AIDP, now s/p trach and PEG (5/21), s/p treatment for sepsis 2/2 MRSA PNA w/ linezolid, now will receive plasmapheresis

## 2019-06-03 NOTE — PROGRESS NOTE ADULT - ASSESSMENT
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 Today patient appears mildly improved in strength compared to exam yesterday. She was placed on CPAP trial with similar Tv of 250s.     -Given high risk of infection would not recommend plasma exchange as Central Line a source of infection  -If possible, pt should receive Privigen or Gammunex IVIg 0.5g/kg one time every 2 weeks, end date to be determined  -If IV formulation not obtainable, then alternative is SubQ IVIg if possible   -LTAC facility able to administer this medication, Medicare will cover medication, issue now with obtaining medication as per LTAC facility it is too high a cost for them to await reimbursement  -If LTAC able will proceed with insurance auth for medication  -If patient is unable to get SubQ IVIg would substitute treatment Sunday/Monday with Plasma Exchange, continue ideally with 1 session every 2 weeks although 2 sessions every month is also an option  -Please follow with Hematology regarding their policy with infusions and frequent central line placement; concern regarding the risk of having to place central line q4w for plasma exchange for the near future  -Patient can follow with Dr. 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 Today patient with worsening weakness, likely needs another round of PLX.     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 Today patient with worsening weakness, likely needs another round of PLX.     -Recommend placement of HD catheter and PLX today given worsening weakness  -Patient would require PLX every 2 weeks  -HD cath greatly increases risk of infection for patient, please ensure proper central line care instructions for LTAC  -Low Reticulocyte Count likely shows anemia of chronic disease  -Mobilize to chair and CPAP trials as tolerated  -PT as tolerated

## 2019-06-03 NOTE — PROGRESS NOTE ADULT - PROBLEM SELECTOR PLAN 6
Iron panel showing ACD. s/p 1u pRBC's 5/18 and 1u pRBC's  5/23   - Currently no source of bleeding  - Maintain active T&S Iron panel showing ACD. s/p 1u pRBC's 5/18 and 1u pRBC's  5/23   - Currently no source of bleeding or hemolysis  - Maintain active T&S

## 2019-06-03 NOTE — PROGRESS NOTE ADULT - ATTENDING COMMENTS
I was physically present for the key portions of the evaluation and managemnent (E/M) service provided.  I agree with the above history, physical, and plan which I have reviewed and edited where appropriate, with the exceptions as per my note.    Pt seen and examined.    Reportedly 5/5 last week but today back to ~4/5 in UEs. also w hgb drop.    neuro exam  trach'd, awake, interactive.  4/5 in both UEs proximally, 4+ in both hand , 2+/5 in both HFs, distal LEs 0.  sens intact to LT on all 4.    AP: demyelinating neuropathy. s/p PLEX. due for PLEX every 2-4wks. currently reportedly worsened c/w last week.    - 1 session of plex now, planned for q2wks.

## 2019-06-03 NOTE — PROGRESS NOTE ADULT - ASSESSMENT
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  Hospital course notable for prolonged intubation and MICU admission, transitioned to tracheostomy and PEG tube on 5/21, now weened off pressors and transferred to chronic vent unit on 4Uris.   Plan was for transfer to LTACH, however unable to transfer due to lack of IV IG, thus in house for neurology recommendations for treatment.     1. Neuro: primary diagnosis of AIDP/GBS, s/p IVIG and plasmapheresis.  Weakness continues to improve however still with persistent respiratory weakness.  Neuro following.  plan was to continue to get IVIG [id number], f/u neuro recs  - Head imaging shows likely chronic parietal infarct   2. Pulm: Tracheostomy (5/21-), tolerating short trials of CPAP, otherwise vent dependent  3. Cardio: On Florinef, midodrine decreased to 10mg TID, s/p levo gtt  4. FEN/GI: PEG 5/21- Repletion of electrolytes prn. On AM rounds, concern for fistula recurrence, will discuss with attending.    5. : Neurogenic bladder w/ Indwelling joseph in place  When stable, consider urology consult for long term management i.e. for placement suprapubic catheter  6. ID: Afebrile currently. Switched to Linezolid 600mg BID (5/24-30), Last urine culture 5/22 reveals VRE and Sputum culture 5/22 and Bronchial wash 5/21 reveals MRSA s/p Vancomycin 1g (5/22-5/24), s/p Meropenem 5/22, s/p ertapenem ended 5/14. F/u ID recs   7. Endocrine: FS, ISS, Lantus 8u qhs (started 5/14)  8. VTE prophylaxis - Upper extremity superficial vein thrombosis noted 5/17, repeat doppler done 5/23-shows stable superficial thrombosis -Lovenox 40mg daily LE Dopplers negative   9. GYN malignancy  - s/p Anastrazole, s/p Megace 80mg BID x3 wks , s/p Tamoxifen 5/1-5/17 (stopped due to upper extremity DVT). Once patient recovers from acute neurologic issue will reassess cancer treatment options   -  Will avoid immunotherapy at this time given associated risk of autoimmune disease   - To confirm with pathology today the evaluation of tissue, evaul for endometrioid vs serous cancer   10. Derm: monitor sacrum for s/s of pressure ulcer, now stage 2- continue with zinc oxide   11. Follow up PT/OT recs   12. Social work/palliative: Pending neurology recommendations for further treatment.  DNR/DNI. 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  Hospital course notable for prolonged intubation and MICU admission, transitioned to tracheostomy and PEG tube on 5/21, now weened off pressors and transferred to chronic vent unit on 4Uris.   Plan was for transfer to LTACH, however unable to transfer due to lack of IV IG, thus in house for neurology recommendations for treatment.   Patient noted to hav  patient has history of enterovaginal fistula which was previously treated conservatively and appeared to have resolved per imaging and resolution of leaking per vagina. Recommend CT w/ oral contrast to rule out enterovaginal fistula. Hold tube feeds other than contrast.     1. Neuro: primary diagnosis of AIDP/GBS, s/p IVIG and plasmapheresis.  Weakness continues to improve however still with persistent respiratory weakness.  Neuro following.  plan was to continue to get IVIG [id number], f/u neuro recs  - Head imaging shows likely chronic parietal infarct   2. Pulm: Tracheostomy (5/21-), tolerating short trials of CPAP, otherwise vent dependent  3. Cardio: On Florinef, midodrine decreased to 10mg TID, s/p levo gtt  4. FEN/GI: PEG 5/21- Repletion of electrolytes prn. On AM rounds, concern for fistula recurrence, please get CT abd/pelvis w/ PO contrast to rule out enterovaginal fistula.    5. : Neurogenic bladder w/ Indwelling joseph in place  When stable, consider urology consult for long term management i.e. for placement suprapubic catheter  6. ID: Afebrile currently. Switched to Linezolid 600mg BID (5/24-30), Last urine culture 5/22 reveals VRE and Sputum culture 5/22 and Bronchial wash 5/21 reveals MRSA s/p Vancomycin 1g (5/22-5/24), s/p Meropenem 5/22, s/p ertapenem ended 5/14. F/u ID recs   7. Endocrine: FS, ISS, Lantus 8u qhs (started 5/14)  8. VTE prophylaxis - Upper extremity superficial vein thrombosis noted 5/17, repeat doppler done 5/23-shows stable superficial thrombosis -Lovenox 40mg daily LE Dopplers negative   9. GYN malignancy  - s/p Anastrazole, s/p Megace 80mg BID x3 wks , s/p Tamoxifen 5/1-5/17 (stopped due to upper extremity DVT). Once patient recovers from acute neurologic issue will reassess cancer treatment options   -  Will avoid immunotherapy at this time given associated risk of autoimmune disease   - To confirm with pathology today the evaluation of tissue, evaul for endometrioid vs serous cancer   10. Derm: monitor sacrum for s/s of pressure ulcer, now stage 2- continue with zinc oxide   11. Follow up PT/OT recs   12. Social work/palliative: Pending neurology recommendations for further treatment.  DNR/DNI. Interdisciplinary meeting was done on 5/23

## 2019-06-03 NOTE — PROGRESS NOTE ADULT - SUBJECTIVE AND OBJECTIVE BOX
Transfusion Service Progress NOte  Asked by the neurology service and medical team to evaluate her for repeat therapeutic plasma exchange due to progressive weakness. Her last TPE was 5/15 - 5/21/19 (3 weeks ago) I reviewed the chart and examined the patient.  She is awake, alert and intubated with a trach. VSS   Skin: pale   Chest: bilateral rhonchi    Cor: REg S1S2   Ext: no edema or calf tenderness    Labs reviewed  Hb: 6.9  PT: 112.2   aPTT: 27.9  chemistry screen reviewed

## 2019-06-03 NOTE — PROGRESS NOTE ADULT - SUBJECTIVE AND OBJECTIVE BOX
GYN PROGRESS NOTE    Pt seen and examined at bedside. No events overnight. Patient noted to have stool leaking today, on exam stool noted to come from vagina not rectum. Patient denies pain, complains of respiratory secretions and poor sleep overnight.     MEDICATIONS  (STANDING):  ascorbic acid 500 milliGRAM(s) Oral daily  chlorhexidine 0.12% Liquid 15 milliLiter(s) Oral Mucosa every 12 hours  chlorhexidine 2% Cloths 1 Application(s) Topical <User Schedule>  dextrose 5%. 1000 milliLiter(s) (50 mL/Hr) IV Continuous <Continuous>  dextrose 50% Injectable 25 Gram(s) IV Push once  fludroCORTISONE 0.1 milliGRAM(s) Oral every 24 hours  Heparin 1000 Units / ml 2 milliLiter(s) 2 milliLiter(s) IV Push once  insulin glargine Injectable (LANTUS) 8 Unit(s) SubCutaneous at bedtime  insulin regular  human corrective regimen sliding scale   SubCutaneous every 6 hours  midodrine 10 milliGRAM(s) Oral every 8 hours  morphine  - Injectable 2 milliGRAM(s) IV Push once  nystatin Cream 1 Application(s) Topical two times a day  zinc oxide 20% Ointment 1 Application(s) Topical three times a day    MEDICATIONS  (PRN):  acetaminophen    Suspension .. 1000 milliGRAM(s) Oral every 8 hours PRN Moderate Pain (4 - 6)  dextrose 40% Gel 15 Gram(s) Oral once PRN Blood Glucose LESS THAN 70 milliGRAM(s)/deciliter  glucagon  Injectable 1 milliGRAM(s) IntraMuscular once PRN Glucose LESS THAN 70 milligrams/deciliter  lidocaine 2% Gel 1 Application(s) Topical two times a day PRN pain around PEG site  sodium chloride 0.9% lock flush 10 milliLiter(s) IV Push every 1 hour PRN Pre/post blood products, medications, blood draw, and to maintain line patency    Vital Signs Last 24 Hrs  T(C): 37 (03 Jun 2019 06:25), Max: 37.8 (02 Jun 2019 21:19)  T(F): 98.6 (03 Jun 2019 06:25), Max: 100 (02 Jun 2019 21:19)  HR: 62 (03 Jun 2019 06:25) (62 - 84)  BP: 112/79 (03 Jun 2019 06:25) (112/79 - 120/64)  BP(mean): --  RR: 16 (03 Jun 2019 06:25) (14 - 18)  SpO2: 99% (03 Jun 2019 06:25) (99% - 100%)    I&O's Summary    02 Jun 2019 07:01  -  03 Jun 2019 07:00  --------------------------------------------------------  IN: 0 mL / OUT: 775 mL / NET: -775 mL    NEURO: good strength in B/l UE, increased mobility in b/l LE compared to recent exams  GEN: patient appears well  LUNGS: no respiratory distress  Sacrum: erythematous and peeling  Pelvic: yellow stool noted on vaginal exam, no stool from rectum  : joseph draining yellow, cloudy urine

## 2019-06-03 NOTE — PROGRESS NOTE ADULT - SUBJECTIVE AND OBJECTIVE BOX
OVERNIGHT EVENTS: KESHAWN    SUBJECTIVE: Patient states she has pain all over today in her joints, headache. Breathing feels stable on CPAP.    Vital Signs Last 12 Hrs  T(F): 98.2 (06-03-19 @ 08:41), Max: 98.6 (06-03-19 @ 06:25)  HR: 65 (06-03-19 @ 08:41) (62 - 65)  BP: 116/72 (06-03-19 @ 08:41) (112/79 - 116/72)  BP(mean): --  RR: 16 (06-03-19 @ 08:41) (16 - 16)  SpO2: 100% (06-03-19 @ 08:41) (99% - 100%)  I&O's Summary    02 Jun 2019 07:01  -  03 Jun 2019 07:00  --------------------------------------------------------  IN: 0 mL / OUT: 775 mL / NET: -775 mL        PHYSICAL EXAM:  Constitutional: NAD, comfortable in bed.  HEENT: NC/AT, PERRLA, EOMI, no conjunctival pallor or scleral icterus, MMM  Neck: Supple, no JVD, tracheostomy in place CDI  Respiratory: on ACVC, lungs w diffuse mild rhonchi  Cardiovascular: RRR, normal S1 and S2, no m/r/g.   Gastrointestinal: +BS, soft NTND, no guarding or rebound tenderness, no palpable masses, PEG in place - no signs of infxn, colostomy in place w brown stool  Extremities: wwp; no cyanosis, clubbing or edema.   Vascular: Pulses equal and strong throughout, R PICC line, R chest chemoport  Neurological: AAOx3, no CN deficits, strength 4/5 in UEs and 3/5 in LEs, sensation intact throughout.       LABS:                        6.9    10.94 )-----------( 153      ( 03 Jun 2019 10:39 )             22.8     06-03    139  |  98  |  19  ----------------------------<  131<H>  3.4<L>   |  29  |  0.46<L>    Ca    9.5      03 Jun 2019 06:33  Phos  4.6     06-03  Mg     1.8     06-03      PT/INR - ( 03 Jun 2019 06:34 )   PT: 12.2 sec;   INR: 1.08          PTT - ( 03 Jun 2019 06:34 )  PTT:27.1 sec      RADIOLOGY & ADDITIONAL TESTS:    MEDICATIONS  (STANDING):  ascorbic acid 500 milliGRAM(s) Oral daily  chlorhexidine 0.12% Liquid 15 milliLiter(s) Oral Mucosa every 12 hours  chlorhexidine 2% Cloths 1 Application(s) Topical <User Schedule>  dextrose 5%. 1000 milliLiter(s) (50 mL/Hr) IV Continuous <Continuous>  dextrose 50% Injectable 25 Gram(s) IV Push once  fludroCORTISONE 0.1 milliGRAM(s) Oral every 24 hours  Heparin 1000 Units / ml 2 milliLiter(s) 2 milliLiter(s) IV Push once  insulin glargine Injectable (LANTUS) 8 Unit(s) SubCutaneous at bedtime  insulin regular  human corrective regimen sliding scale   SubCutaneous every 6 hours  iohexol 300 mG (iodine)/mL Oral Solution 30 milliLiter(s) Oral once  midodrine 10 milliGRAM(s) Oral every 8 hours  morphine  - Injectable 2 milliGRAM(s) IV Push once  nystatin Cream 1 Application(s) Topical two times a day  zinc oxide 20% Ointment 1 Application(s) Topical three times a day    MEDICATIONS  (PRN):  acetaminophen    Suspension .. 1000 milliGRAM(s) Oral every 8 hours PRN Moderate Pain (4 - 6)  dextrose 40% Gel 15 Gram(s) Oral once PRN Blood Glucose LESS THAN 70 milliGRAM(s)/deciliter  glucagon  Injectable 1 milliGRAM(s) IntraMuscular once PRN Glucose LESS THAN 70 milligrams/deciliter  lidocaine 2% Gel 1 Application(s) Topical two times a day PRN pain around PEG site  sodium chloride 0.9% lock flush 10 milliLiter(s) IV Push every 1 hour PRN Pre/post blood products, medications, blood draw, and to maintain line patency OVERNIGHT EVENTS: KESHAWN    SUBJECTIVE: Patient states she has pain all over today in her joints, headache. Breathing feels stable on CPAP.    Vital Signs Last 12 Hrs  T(F): 98.2 (06-03-19 @ 08:41), Max: 98.6 (06-03-19 @ 06:25)  HR: 65 (06-03-19 @ 08:41) (62 - 65)  BP: 116/72 (06-03-19 @ 08:41) (112/79 - 116/72)  BP(mean): --  RR: 16 (06-03-19 @ 08:41) (16 - 16)  SpO2: 100% (06-03-19 @ 08:41) (99% - 100%)  I&O's Summary    02 Jun 2019 07:01  -  03 Jun 2019 07:00  --------------------------------------------------------  IN: 0 mL / OUT: 775 mL / NET: -775 mL        PHYSICAL EXAM:  Constitutional: NAD, comfortable in bed.  HEENT: NC/AT, PERRLA, EOMI, no conjunctival pallor or scleral icterus, MMM  Neck: Supple, no JVD, tracheostomy in place CDI  Respiratory: on ACVC, lungs w diffuse mild rhonchi  Cardiovascular: RRR, normal S1 and S2, no m/r/g.   Gastrointestinal: +BS, soft NTND, no guarding or rebound tenderness, no palpable masses, PEG in place - no signs of infxn, colostomy in place w brown stool  Extremities: wwp; no cyanosis, clubbing or edema.   Vascular: Pulses equal and strong throughout, R PICC line, R chest chemoport  Neurological: AAOx3, no CN deficits, strength 4/5 in UEs and 1/5 in LEs, sensation intact throughout.       LABS:                        6.9    10.94 )-----------( 153      ( 03 Jun 2019 10:39 )             22.8     06-03    139  |  98  |  19  ----------------------------<  131<H>  3.4<L>   |  29  |  0.46<L>    Ca    9.5      03 Jun 2019 06:33  Phos  4.6     06-03  Mg     1.8     06-03      PT/INR - ( 03 Jun 2019 06:34 )   PT: 12.2 sec;   INR: 1.08          PTT - ( 03 Jun 2019 06:34 )  PTT:27.1 sec      RADIOLOGY & ADDITIONAL TESTS:    MEDICATIONS  (STANDING):  ascorbic acid 500 milliGRAM(s) Oral daily  chlorhexidine 0.12% Liquid 15 milliLiter(s) Oral Mucosa every 12 hours  chlorhexidine 2% Cloths 1 Application(s) Topical <User Schedule>  dextrose 5%. 1000 milliLiter(s) (50 mL/Hr) IV Continuous <Continuous>  dextrose 50% Injectable 25 Gram(s) IV Push once  fludroCORTISONE 0.1 milliGRAM(s) Oral every 24 hours  Heparin 1000 Units / ml 2 milliLiter(s) 2 milliLiter(s) IV Push once  insulin glargine Injectable (LANTUS) 8 Unit(s) SubCutaneous at bedtime  insulin regular  human corrective regimen sliding scale   SubCutaneous every 6 hours  iohexol 300 mG (iodine)/mL Oral Solution 30 milliLiter(s) Oral once  midodrine 10 milliGRAM(s) Oral every 8 hours  morphine  - Injectable 2 milliGRAM(s) IV Push once  nystatin Cream 1 Application(s) Topical two times a day  zinc oxide 20% Ointment 1 Application(s) Topical three times a day    MEDICATIONS  (PRN):  acetaminophen    Suspension .. 1000 milliGRAM(s) Oral every 8 hours PRN Moderate Pain (4 - 6)  dextrose 40% Gel 15 Gram(s) Oral once PRN Blood Glucose LESS THAN 70 milliGRAM(s)/deciliter  glucagon  Injectable 1 milliGRAM(s) IntraMuscular once PRN Glucose LESS THAN 70 milligrams/deciliter  lidocaine 2% Gel 1 Application(s) Topical two times a day PRN pain around PEG site  sodium chloride 0.9% lock flush 10 milliLiter(s) IV Push every 1 hour PRN Pre/post blood products, medications, blood draw, and to maintain line patency

## 2019-06-03 NOTE — PROGRESS NOTE ADULT - PROBLEM SELECTOR PLAN 1
2/2 to demyelinating polyneuropathy (AIDP), patient has required 3 intubations this admission, now s/p trach (5/21) and s/p 4 doses of IVIG finished 5/11 and 5 rounds of plasmapheresis ( finished 5/21)  - C/w mechanical ventilation at night and cpap during day. wean as tolerated  - C/w frequent suctioning  - plasmapheresis today after HD cath placed by IR

## 2019-06-03 NOTE — PROGRESS NOTE ADULT - PROBLEM SELECTOR PLAN 3
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 s/p 4 rounds of IVIG and 5 rounds of plasmapheresis (completed 5/21). 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  - C/w plan as per above problem 1 & 2  - Start 0.5g/kg of IVIG every 2 weeks starting 6/3 if can get it. will need either privigen or gammunex  - Neurology on board trying to help coordinate IVIG administration vs plasmapheresis on monday

## 2019-06-03 NOTE — PROGRESS NOTE ADULT - SUBJECTIVE AND OBJECTIVE BOX
O/N Events: KESHAWN  Subjective/ROS: Denies HA, CP, SOB, n/v, changes in bowel/urinary habits.  12pt ROS otherwise negative.    VITALS  Vital Signs Last 24 Hrs  T(C): 37 (03 Jun 2019 06:25), Max: 37.8 (02 Jun 2019 21:19)  T(F): 98.6 (03 Jun 2019 06:25), Max: 100 (02 Jun 2019 21:19)  HR: 62 (03 Jun 2019 06:25) (62 - 84)  BP: 112/79 (03 Jun 2019 06:25) (112/79 - 120/64)  BP(mean): --  RR: 16 (03 Jun 2019 06:25) (14 - 18)  SpO2: 99% (03 Jun 2019 06:25) (99% - 100%)    CAPILLARY BLOOD GLUCOSE      POCT Blood Glucose.: 121 mg/dL (03 Jun 2019 06:07)  POCT Blood Glucose.: 118 mg/dL (03 Jun 2019 00:17)  POCT Blood Glucose.: 133 mg/dL (02 Jun 2019 22:01)  POCT Blood Glucose.: 140 mg/dL (02 Jun 2019 18:04)  POCT Blood Glucose.: 163 mg/dL (02 Jun 2019 12:17)  POCT Blood Glucose.: 184 mg/dL (02 Jun 2019 06:54)      PHYSICAL EXAM  Gen: lying comfortably in bed, laughing appropriately to jokes  CV: RRR, S1/S2  Resp: CTABL  Neurologic:  - Mental Status:  AAOx3; speech is limited from trach but appears fluent with intact naming, repetition, and comprehension  - Cranial Nerves II-XII:    II:  PERRLA; visual fields are full to confrontation  III, IV, VI:  EOMI, no nystagmus  V:  facial sensation is intact in the V1-V3 distribution bilaterally.  VII:  face is symmetric with residual weakness on eye closure and smile  VIII:  hearing is intact to finger rub  IX, X:  uvula is midline and soft palate rises symmetrically  XI:  head turning and shoulder shrug are intact bilaterally  XII:  tongue protrudes in the midline  - Motor:  strength is 4+-5/5 throughout upper extremity; Hip Flexion 2/5, Knee Extension and Flexion 1-2/5, Ankle 1-2/5;  - Reflexes: A reflexic   - Gait:  deferred    MEDICATIONS  (STANDING):  ascorbic acid 500 milliGRAM(s) Oral daily  chlorhexidine 0.12% Liquid 15 milliLiter(s) Oral Mucosa every 12 hours  chlorhexidine 2% Cloths 1 Application(s) Topical <User Schedule>  dextrose 5%. 1000 milliLiter(s) (50 mL/Hr) IV Continuous <Continuous>  dextrose 50% Injectable 25 Gram(s) IV Push once  fludroCORTISONE 0.1 milliGRAM(s) Oral every 24 hours  Heparin 1000 Units / ml 2 milliLiter(s) 2 milliLiter(s) IV Push once  insulin glargine Injectable (LANTUS) 8 Unit(s) SubCutaneous at bedtime  insulin regular  human corrective regimen sliding scale   SubCutaneous every 6 hours  midodrine 10 milliGRAM(s) Oral every 8 hours  morphine  - Injectable 2 milliGRAM(s) IV Push once  nystatin Cream 1 Application(s) Topical two times a day  zinc oxide 20% Ointment 1 Application(s) Topical three times a day    MEDICATIONS  (PRN):  acetaminophen    Suspension .. 1000 milliGRAM(s) Oral every 8 hours PRN Moderate Pain (4 - 6)  dextrose 40% Gel 15 Gram(s) Oral once PRN Blood Glucose LESS THAN 70 milliGRAM(s)/deciliter  glucagon  Injectable 1 milliGRAM(s) IntraMuscular once PRN Glucose LESS THAN 70 milligrams/deciliter  lidocaine 2% Gel 1 Application(s) Topical two times a day PRN pain around PEG site  sodium chloride 0.9% lock flush 10 milliLiter(s) IV Push every 1 hour PRN Pre/post blood products, medications, blood draw, and to maintain line patency      IVIG PRODUCT IS PRIGIVEN OR GAMMUNEX (Unknown)  No Known Allergies      LABS                    IMAGING/EKG/ETC  EKG:  Xray:  CT:  MRI: O/N Events: Patient with a drop in Hgb this morning. Found to have stool leaking from vagina. Patient was observed not pulling good Vt with pressure alarm on ventilator, suctioning performed by nursing revealed bloody secretions.   Subjective/ROS: Complaining of generalized weakness. Denies HA, CP, SOB, n/v, changes in bowel/urinary habits.  12pt ROS otherwise negative.    VITALS  Vital Signs Last 24 Hrs  T(C): 37 (03 Jun 2019 06:25), Max: 37.8 (02 Jun 2019 21:19)  T(F): 98.6 (03 Jun 2019 06:25), Max: 100 (02 Jun 2019 21:19)  HR: 62 (03 Jun 2019 06:25) (62 - 84)  BP: 112/79 (03 Jun 2019 06:25) (112/79 - 120/64)  BP(mean): --  RR: 16 (03 Jun 2019 06:25) (14 - 18)  SpO2: 99% (03 Jun 2019 06:25) (99% - 100%)    CAPILLARY BLOOD GLUCOSE      POCT Blood Glucose.: 121 mg/dL (03 Jun 2019 06:07)  POCT Blood Glucose.: 118 mg/dL (03 Jun 2019 00:17)  POCT Blood Glucose.: 133 mg/dL (02 Jun 2019 22:01)  POCT Blood Glucose.: 140 mg/dL (02 Jun 2019 18:04)  POCT Blood Glucose.: 163 mg/dL (02 Jun 2019 12:17)  POCT Blood Glucose.: 184 mg/dL (02 Jun 2019 06:54)      PHYSICAL EXAM  Gen: lying comfortably in bed, laughing appropriately to jokes  CV: RRR, S1/S2  Resp: CTABL  Neurologic:  - Mental Status:  AAOx3; speech is limited from trach but appears fluent with intact naming, repetition, and comprehension  - Cranial Nerves II-XII:    II:  PERRLA; visual fields are full to confrontation  III, IV, VI:  EOMI, no nystagmus  V:  facial sensation is intact in the V1-V3 distribution bilaterally.  VII:  face is symmetric with residual weakness on eye closure and smile  VIII:  hearing is intact to finger rub  IX, X:  uvula is midline and soft palate rises symmetrically  XI:  head turning and shoulder shrug are intact bilaterally  XII:  tongue protrudes in the midline  - Motor:  strength is 3-4/5 throughout upper extremity; Hip Flexion 2/5, Knee Extension and Flexion 1-2/5, Ankle 1-2/5;  - Reflexes: A reflexic   - Gait:  deferred    MEDICATIONS  (STANDING):  ascorbic acid 500 milliGRAM(s) Oral daily  chlorhexidine 0.12% Liquid 15 milliLiter(s) Oral Mucosa every 12 hours  chlorhexidine 2% Cloths 1 Application(s) Topical <User Schedule>  dextrose 5%. 1000 milliLiter(s) (50 mL/Hr) IV Continuous <Continuous>  dextrose 50% Injectable 25 Gram(s) IV Push once  fludroCORTISONE 0.1 milliGRAM(s) Oral every 24 hours  Heparin 1000 Units / ml 2 milliLiter(s) 2 milliLiter(s) IV Push once  insulin glargine Injectable (LANTUS) 8 Unit(s) SubCutaneous at bedtime  insulin regular  human corrective regimen sliding scale   SubCutaneous every 6 hours  midodrine 10 milliGRAM(s) Oral every 8 hours  morphine  - Injectable 2 milliGRAM(s) IV Push once  nystatin Cream 1 Application(s) Topical two times a day  zinc oxide 20% Ointment 1 Application(s) Topical three times a day    MEDICATIONS  (PRN):  acetaminophen    Suspension .. 1000 milliGRAM(s) Oral every 8 hours PRN Moderate Pain (4 - 6)  dextrose 40% Gel 15 Gram(s) Oral once PRN Blood Glucose LESS THAN 70 milliGRAM(s)/deciliter  glucagon  Injectable 1 milliGRAM(s) IntraMuscular once PRN Glucose LESS THAN 70 milligrams/deciliter  lidocaine 2% Gel 1 Application(s) Topical two times a day PRN pain around PEG site  sodium chloride 0.9% lock flush 10 milliLiter(s) IV Push every 1 hour PRN Pre/post blood products, medications, blood draw, and to maintain line patency      IVIG PRODUCT IS PRIGIVEN OR GAMMUNEX (Unknown)  No Known Allergies      LABS                    IMAGING/EKG/ETC  EKG:  Xray:  CT:  MRI:

## 2019-06-04 DIAGNOSIS — Z99.11 DEPENDENCE ON RESPIRATOR [VENTILATOR] STATUS: ICD-10-CM

## 2019-06-04 LAB
ALBUMIN SERPL ELPH-MCNC: 3.1 G/DL — LOW (ref 3.3–5)
ALP SERPL-CCNC: 72 U/L — SIGNIFICANT CHANGE UP (ref 40–120)
ALT FLD-CCNC: 9 U/L — LOW (ref 10–45)
ANION GAP SERPL CALC-SCNC: 12 MMOL/L — SIGNIFICANT CHANGE UP (ref 5–17)
AST SERPL-CCNC: 15 U/L — SIGNIFICANT CHANGE UP (ref 10–40)
BILIRUB SERPL-MCNC: 0.5 MG/DL — SIGNIFICANT CHANGE UP (ref 0.2–1.2)
BUN SERPL-MCNC: 17 MG/DL — SIGNIFICANT CHANGE UP (ref 7–23)
CALCIUM SERPL-MCNC: 9.6 MG/DL — SIGNIFICANT CHANGE UP (ref 8.4–10.5)
CHLORIDE SERPL-SCNC: 102 MMOL/L — SIGNIFICANT CHANGE UP (ref 96–108)
CO2 SERPL-SCNC: 28 MMOL/L — SIGNIFICANT CHANGE UP (ref 22–31)
CREAT SERPL-MCNC: 0.5 MG/DL — SIGNIFICANT CHANGE UP (ref 0.5–1.3)
GLUCOSE SERPL-MCNC: 91 MG/DL — SIGNIFICANT CHANGE UP (ref 70–99)
HCT VFR BLD CALC: 26.1 % — LOW (ref 34.5–45)
HGB BLD-MCNC: 8.3 G/DL — LOW (ref 11.5–15.5)
MAGNESIUM SERPL-MCNC: 1.8 MG/DL — SIGNIFICANT CHANGE UP (ref 1.6–2.6)
MCHC RBC-ENTMCNC: 28.8 PG — SIGNIFICANT CHANGE UP (ref 27–34)
MCHC RBC-ENTMCNC: 31.8 GM/DL — LOW (ref 32–36)
MCV RBC AUTO: 90.6 FL — SIGNIFICANT CHANGE UP (ref 80–100)
NRBC # BLD: 0 /100 WBCS — SIGNIFICANT CHANGE UP (ref 0–0)
PLATELET # BLD AUTO: 148 K/UL — LOW (ref 150–400)
POTASSIUM SERPL-MCNC: 3.3 MMOL/L — LOW (ref 3.5–5.3)
POTASSIUM SERPL-SCNC: 3.3 MMOL/L — LOW (ref 3.5–5.3)
PROT SERPL-MCNC: 6.8 G/DL — SIGNIFICANT CHANGE UP (ref 6–8.3)
RBC # BLD: 2.88 M/UL — LOW (ref 3.8–5.2)
RBC # FLD: 15.4 % — HIGH (ref 10.3–14.5)
SODIUM SERPL-SCNC: 142 MMOL/L — SIGNIFICANT CHANGE UP (ref 135–145)
WBC # BLD: 7.34 K/UL — SIGNIFICANT CHANGE UP (ref 3.8–10.5)
WBC # FLD AUTO: 7.34 K/UL — SIGNIFICANT CHANGE UP (ref 3.8–10.5)

## 2019-06-04 PROCEDURE — 71045 X-RAY EXAM CHEST 1 VIEW: CPT | Mod: 26

## 2019-06-04 PROCEDURE — 74018 RADEX ABDOMEN 1 VIEW: CPT | Mod: 26

## 2019-06-04 PROCEDURE — 99222 1ST HOSP IP/OBS MODERATE 55: CPT

## 2019-06-04 PROCEDURE — 76937 US GUIDE VASCULAR ACCESS: CPT | Mod: 26

## 2019-06-04 PROCEDURE — 99232 SBSQ HOSP IP/OBS MODERATE 35: CPT

## 2019-06-04 PROCEDURE — 99233 SBSQ HOSP IP/OBS HIGH 50: CPT | Mod: GC

## 2019-06-04 PROCEDURE — 77001 FLUOROGUIDE FOR VEIN DEVICE: CPT | Mod: 26

## 2019-06-04 PROCEDURE — 36558 INSERT TUNNELED CV CATH: CPT

## 2019-06-04 PROCEDURE — 99152 MOD SED SAME PHYS/QHP 5/>YRS: CPT

## 2019-06-04 RX ORDER — IPRATROPIUM/ALBUTEROL SULFATE 18-103MCG
3 AEROSOL WITH ADAPTER (GRAM) INHALATION EVERY 6 HOURS
Refills: 0 | Status: DISCONTINUED | OUTPATIENT
Start: 2019-06-04 | End: 2019-06-19

## 2019-06-04 RX ORDER — ALBUTEROL 90 UG/1
2.5 AEROSOL, METERED ORAL EVERY 8 HOURS
Refills: 0 | Status: DISCONTINUED | OUTPATIENT
Start: 2019-06-04 | End: 2019-06-04

## 2019-06-04 RX ORDER — POTASSIUM CHLORIDE 20 MEQ
40 PACKET (EA) ORAL
Refills: 0 | Status: COMPLETED | OUTPATIENT
Start: 2019-06-04 | End: 2019-06-04

## 2019-06-04 RX ORDER — SODIUM CHLORIDE 9 MG/ML
3 INJECTION INTRAMUSCULAR; INTRAVENOUS; SUBCUTANEOUS EVERY 4 HOURS
Refills: 0 | Status: DISCONTINUED | OUTPATIENT
Start: 2019-06-04 | End: 2019-06-05

## 2019-06-04 RX ADMIN — Medication 1000 MILLIGRAM(S): at 12:30

## 2019-06-04 RX ADMIN — Medication 1000 MILLIGRAM(S): at 01:10

## 2019-06-04 RX ADMIN — ALBUTEROL 2.5 MILLIGRAM(S): 90 AEROSOL, METERED ORAL at 05:58

## 2019-06-04 RX ADMIN — SODIUM CHLORIDE 3 MILLILITER(S): 9 INJECTION INTRAMUSCULAR; INTRAVENOUS; SUBCUTANEOUS at 18:06

## 2019-06-04 RX ADMIN — FLUDROCORTISONE ACETATE 0.1 MILLIGRAM(S): 0.1 TABLET ORAL at 06:00

## 2019-06-04 RX ADMIN — ZINC OXIDE 1 APPLICATION(S): 200 OINTMENT TOPICAL at 16:54

## 2019-06-04 RX ADMIN — Medication 1000 MILLIGRAM(S): at 20:54

## 2019-06-04 RX ADMIN — Medication 4 MILLILITER(S): at 01:07

## 2019-06-04 RX ADMIN — MIDODRINE HYDROCHLORIDE 10 MILLIGRAM(S): 2.5 TABLET ORAL at 05:59

## 2019-06-04 RX ADMIN — Medication 40 MILLIEQUIVALENT(S): at 09:21

## 2019-06-04 RX ADMIN — NYSTATIN CREAM 1 APPLICATION(S): 100000 CREAM TOPICAL at 17:41

## 2019-06-04 RX ADMIN — Medication 500 MILLIGRAM(S): at 11:31

## 2019-06-04 RX ADMIN — SODIUM CHLORIDE 3 MILLILITER(S): 9 INJECTION INTRAMUSCULAR; INTRAVENOUS; SUBCUTANEOUS at 22:54

## 2019-06-04 RX ADMIN — CHLORHEXIDINE GLUCONATE 15 MILLILITER(S): 213 SOLUTION TOPICAL at 11:32

## 2019-06-04 RX ADMIN — ZINC OXIDE 1 APPLICATION(S): 200 OINTMENT TOPICAL at 06:01

## 2019-06-04 RX ADMIN — SODIUM CHLORIDE 3 MILLILITER(S): 9 INJECTION INTRAMUSCULAR; INTRAVENOUS; SUBCUTANEOUS at 01:21

## 2019-06-04 RX ADMIN — Medication 1000 MILLIGRAM(S): at 11:48

## 2019-06-04 RX ADMIN — Medication 3 MILLILITER(S): at 18:06

## 2019-06-04 RX ADMIN — SODIUM CHLORIDE 3 MILLILITER(S): 9 INJECTION INTRAMUSCULAR; INTRAVENOUS; SUBCUTANEOUS at 11:36

## 2019-06-04 RX ADMIN — Medication 40 MILLIEQUIVALENT(S): at 11:31

## 2019-06-04 RX ADMIN — SODIUM CHLORIDE 80 MILLILITER(S): 9 INJECTION, SOLUTION INTRAVENOUS at 05:59

## 2019-06-04 RX ADMIN — NYSTATIN CREAM 1 APPLICATION(S): 100000 CREAM TOPICAL at 06:01

## 2019-06-04 RX ADMIN — MIDODRINE HYDROCHLORIDE 10 MILLIGRAM(S): 2.5 TABLET ORAL at 18:06

## 2019-06-04 RX ADMIN — Medication 4 MILLILITER(S): at 06:00

## 2019-06-04 RX ADMIN — CHLORHEXIDINE GLUCONATE 1 APPLICATION(S): 213 SOLUTION TOPICAL at 05:56

## 2019-06-04 RX ADMIN — ZINC OXIDE 1 APPLICATION(S): 200 OINTMENT TOPICAL at 22:54

## 2019-06-04 RX ADMIN — Medication 1000 MILLIGRAM(S): at 21:28

## 2019-06-04 RX ADMIN — CHLORHEXIDINE GLUCONATE 15 MILLILITER(S): 213 SOLUTION TOPICAL at 22:54

## 2019-06-04 RX ADMIN — SODIUM CHLORIDE 3 MILLILITER(S): 9 INJECTION INTRAMUSCULAR; INTRAVENOUS; SUBCUTANEOUS at 05:59

## 2019-06-04 NOTE — CONSULT NOTE ADULT - CONSULT REQUESTED DATE/TIME
03-May-2019 09:00
03-May-2019 19:17
04-Jun-2019 12:00
07-May-2019 15:53
13-May-2019 16:44
21-May-2019 10:45
23-May-2019 13:00
23-May-2019 14:07
30-Apr-2019 12:56
30-Apr-2019 16:17
06-May-2019 21:32

## 2019-06-04 NOTE — PROGRESS NOTE ADULT - ASSESSMENT
60yo F HD36 with stage IV endometrial AdenoCA s/p staging surgery '18 and 1 round of chemotherapy 2/19 readmitted from  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  Hospital course notable for prolonged intubation and MICU admission, transitioned to tracheostomy and PEG tube on 5/21, now weened off pressors and transferred to chronic vent unit on [facility]Uris.   Plan was for transfer to LTACH, however unable to transfer due to lack of IV IG, thus in house for neurology recommendations for treatment.   Patient noted to hav  patient has history of enterovaginal fistula which was previously treated conservatively and appeared to have resolved per imaging and resolution of leaking per vagina. Recommend CT w/ oral contrast to rule out enterovaginal fistula. Hold tube feeds other than contrast.   Management per primary team.  1. Neuro: primary diagnosis of AIDP/GBS, s/p IVIG and plasmapheresis.  Weakness continues to improve however still with persistent respiratory weakness.  Neuro following.  plan was to continue to get IVIG [id number] OR if IVIG unavailable plasmapheresis.   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 Pulm: Tracheostomy (5/21-), O/N patient w/ increased respiratory secretions, f/u bronchoscopy  3. Cardio: On Florinef, midodrine decreased to 10mg TID, s/p levo gtt  4. FEN/GI: PEG 5/21- Repletion of electrolytes prn. Concern for fistula recurrence, please get CT abd/pelvis w/ PO contrast to rule out enterovaginal fistula.    5. : Neurogenic bladder w/ Indwelling joseph in place  When stable, consider urology consult for long term management i.e. for placement suprapubic catheter  6. ID: Afebrile currently. Switched to Linezolid 600mg BID (5/24-30), Last urine culture 5/22 reveals VRE and Sputum culture 5/22 and Bronchial wash 5/21 reveals MRSA s/p Vancomycin 1g (5/22-5/24), s/p Meropenem 5/22, s/p ertapenem ended 5/14. F/u ID recs   7. Endocrine: FS, ISS, Lantus 8u qhs (started 5/14)  8. VTE prophylaxis - Upper extremity superficial vein thrombosis noted 5/17, repeat doppler done 5/23-shows stable superficial thrombosis -Lovenox 40mg daily LE Dopplers negative   9. GYN malignancy  - s/p Anastrazole, s/p Megace 80mg BID x3 wks , s/p Tamoxifen 5/1-5/17 (stopped due to upper extremity DVT). Once patient recovers from acute neurologic issue will reassess cancer treatment options   -  Will avoid immunotherapy at this time given associated risk of autoimmune disease   - To confirm with pathology today the evaluation of tissue, eval for endometrioid vs serous cancer   10. Derm: monitor sacrum for s/s of pressure ulcer, now stage 2- continue with zinc oxide   11. Follow up PT/OT recs   12. Social work/palliative: Pending neurology recommendations for further treatment.  DNR/DNI. 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  Hospital course notable for prolonged intubation and MICU admission, transitioned to tracheostomy and PEG tube on 5/21, now weened off pressors and transferred to chronic vent unit on 4[facility]ris.   Plan was for transfer to LTACH, however unable to transfer due to lack of IV IG, thus in house for neurology recommendations for treatment.   Patient noted to hav  patient has history of enterovaginal fistula which was previously treated conservatively and appeared to have resolved per imaging and resolution of leaking per vagina. Recommend CT w/ oral contrast to rule out enterovaginal fistula. Hold tube feeds other than contrast.   Management per primary team.  1. Neuro: primary diagnosis of AIDP/GBS, s/p IVIG and plasmapheresis.  Weakness continues to improve however still with persistent respiratory weakness.  Neuro following.  Plasmapheresis today 6/4, plan for repeat plasmapheresis on Thursday 6/6  - Head imaging shows likely chronic parietal infarct   2. Pulm: Tracheostomy (5/21-), O/N patient w/ increased respiratory secretions, f/u bronchoscopy  3. Cardio: On Florinef, midodrine 10mg TID, s/p levo gtt  4. FEN/GI: PEG 5/21- Repletion of electrolytes prn. Concern for fistula recurrence, please get CT abd/pelvis w/ PO contrast to rule out enterovaginal fistula.    5. : Neurogenic bladder w/ Indwelling joseph in place  When stable, consider urology consult for long term management i.e. for placement suprapubic catheter  6. ID: Afebrile currently. Switched to Linezolid 600mg BID (5/24-30), Last urine culture 5/22 reveals VRE and Sputum culture 5/22 and Bronchial wash 5/21 reveals MRSA s/p Vancomycin 1g (5/22-5/24), s/p Meropenem 5/22, s/p ertapenem ended 5/14. F/u ID recs   7. Endocrine: FS, ISS, Lantus 8u qhs (started 5/14)  8. VTE prophylaxis - Upper extremity superficial vein thrombosis noted 5/17, repeat doppler done 5/23-shows stable superficial thrombosis -Lovenox 40mg daily LE Dopplers negative   9. GYN malignancy  - s/p Anastrazole, s/p Megace 80mg BID x3 wks , s/p Tamoxifen 5/1-5/17 (stopped due to upper extremity DVT). Once patient recovers from acute neurologic issue will reassess cancer treatment options   -  Will avoid immunotherapy at this time given associated risk of autoimmune disease   - To confirm with pathology today the evaluation of tissue, eval for endometrioid vs serous cancer   10. Derm: monitor sacrum for s/s of pressure ulcer, now stage 2- continue with zinc oxide   11. Follow up PT/OT recs   12. Social work/palliative: Pending neurology recommendations for further treatment.  DNR/DNI. Interdisciplinary meeting was done on 5/23

## 2019-06-04 NOTE — PROGRESS NOTE ADULT - PROBLEM SELECTOR PLAN 3
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 s/p 4 rounds of IVIG and 5 rounds of plasmapheresis (completed 5/21). 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  - C/w plan as per above problem 1 & 2  - will give 0.5g/kg of IVIG every 2 weeks starting 6/3 if can get it. will need either privigen or gammunex  - Neurology following  - plasmapheresis as in problem #1

## 2019-06-04 NOTE — PROGRESS NOTE ADULT - SUBJECTIVE AND OBJECTIVE BOX
Patient reports feeling stronger today after the transfusion yesterday, but with increased secretions. She was seen at the start of plasmapheresis.  PE; VSS   Chest: Clear to anterior auscultation   Cor: Reg S1S2   Ext: no edema or calf tenderness  Labs reviewed  To have TPe today and Thursday. Long term management plan to be discussed with neurology team.

## 2019-06-04 NOTE — CONSULT NOTE ADULT - SUBJECTIVE AND OBJECTIVE BOX
Pulmonary Consult Note    HPI:  59 year old female with stage IV endometrial carcinoma s/p with known enterovaginal fistula complicated by recurrent UTI who is currently being treated for demyelinating neuropathy thought to be possibly AIDP. She has been admitted for over 30 days and hospital stay was complicated by septic shock due to ESBL UTI and MRSA pneumonia , as well as respiratory failure secondary to both pneumonia and neuromuscular disorder that eventually required tracheostomy and she is currently nearly ventilator dependent. She was treated with Abx for PNA and UTI and improved. She was also treated with IVIG and plasmaphoresis for AIDP and showed improvment and was later downgraded from ICU and eventually to the medical floors.   Patient has been doing well and was tolerating CPAP until yesterday noted to have increased peak pressures on vent without desaturation. Respiratory therapy performed extensive suctioning and she was given several doses of inhaled mucomyst which improved the peak pressure. She has also been noted to have increased secretions over the past few days.       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     SOCIAL HISTORY:  Smoking Status: [ ] Current, [ ] Former, [X] Never    MEDICATIONS:  Pulmonary:  ALBUTerol/ipratropium for Nebulization 3 milliLiter(s) Nebulizer every 6 hours  sodium chloride 0.9% for Nebulization 3 milliLiter(s) Nebulizer every 4 hours    Antimicrobials:    Anticoagulants:    Onc:    GI/:    Endocrine:  dextrose 40% Gel 15 Gram(s) Oral once PRN  dextrose 50% Injectable 25 Gram(s) IV Push once  fludroCORTISONE 0.1 milliGRAM(s) Oral every 24 hours  glucagon  Injectable 1 milliGRAM(s) IntraMuscular once PRN  insulin regular  human corrective regimen sliding scale   SubCutaneous every 6 hours    Cardiac:  midodrine 10 milliGRAM(s) Oral every 8 hours    Other Medications:  acetaminophen    Suspension .. 1000 milliGRAM(s) Oral every 8 hours PRN  albumin human  5% IVPB 2750 milliLiter(s) IV Intermittent once  ascorbic acid 500 milliGRAM(s) Oral daily  calcium gluconate IVPB 1 Gram(s) IV Intermittent once  chlorhexidine 0.12% Liquid 15 milliLiter(s) Oral Mucosa every 12 hours  chlorhexidine 2% Cloths 1 Application(s) Topical <User Schedule>  dextrose 40% Gel 15 Gram(s) Oral once PRN  dextrose 5%. 1000 milliLiter(s) IV Continuous <Continuous>  dextrose 50% Injectable 25 Gram(s) IV Push once  fludroCORTISONE 0.1 milliGRAM(s) Oral every 24 hours  glucagon  Injectable 1 milliGRAM(s) IntraMuscular once PRN  heparin 1000 units/mL 2000 Unit(s) 2000 Unit(s) IV Push once  insulin regular  human corrective regimen sliding scale   SubCutaneous every 6 hours  lactated ringers. 1000 milliLiter(s) IV Continuous <Continuous>  lidocaine 2% Gel 1 Application(s) Topical two times a day PRN  morphine  - Injectable 2 milliGRAM(s) IV Push once  nystatin Cream 1 Application(s) Topical two times a day  sodium chloride 0.9% lock flush 10 milliLiter(s) IV Push every 1 hour PRN  zinc oxide 20% Ointment 1 Application(s) Topical three times a day    Allergies    No Known Allergies    Intolerances    IVIG PRODUCT IS PRIGIVEN OR GAMMUNEX (Unknown)    Vital Signs Last 24 Hrs  T(C): 37 (04 Jun 2019 17:18), Max: 37.2 (04 Jun 2019 09:48)  T(F): 98.6 (04 Jun 2019 17:18), Max: 98.9 (04 Jun 2019 09:48)  HR: 72 (04 Jun 2019 17:18) (65 - 95)  BP: 124/82 (04 Jun 2019 17:18) (119/79 - 146/81)  BP(mean): --  RR: 20 (04 Jun 2019 17:18) (12 - 20)  SpO2: 100% (04 Jun 2019 17:18) (99% - 100%)    06-03 @ 07:01  -  06-04 @ 07:00  --------------------------------------------------------  IN: 800 mL / OUT: 400 mL / NET: 400 mL    06-04 @ 07:01 - 06-04 @ 17:29  --------------------------------------------------------  IN: 0 mL / OUT: 450 mL / NET: -450 mL      Mode: AC/ CMV (Assist Control/ Continuous Mandatory Ventilation)  RR (machine): 12  TV (machine): 320  FiO2: 30  PEEP: 5  ITime: 1  MAP: 11  PIP: 26    LABS:  CBC Full  -  ( 04 Jun 2019 06:46 )  WBC Count : 7.34 K/uL  RBC Count : 2.88 M/uL  Hemoglobin : 8.3 g/dL  Hematocrit : 26.1 %  Platelet Count - Automated : 148 K/uL  Mean Cell Volume : 90.6 fl  Mean Cell Hemoglobin : 28.8 pg  Mean Cell Hemoglobin Concentration : 31.8 gm/dL    06-04    142  |  102  |  17  ----------------------------<  91  3.3<L>   |  28  |  0.50    Ca    9.6      04 Jun 2019 06:46  Phos  4.6     06-03  Mg     1.8     06-04    TPro  6.8  /  Alb  3.1<L>  /  TBili  0.5  /  DBili  x   /  AST  15  /  ALT  9<L>  /  AlkPhos  72  06-04    PT/INR - ( 03 Jun 2019 06:34 )   PT: 12.2 sec;   INR: 1.08        PTT - ( 03 Jun 2019 06:34 )  PTT:27.1 sec    RADIOLOGY & ADDITIONAL STUDIES (The following images were personally reviewed):    Chest X-ray 06/03/2019   Heart size is stable. Right chest port catheter and   tracheostomy catheter are again noted to be in place. Right arm PICC line   in place. No focal consolidation or pleural effusion. No pneumothorax.    IMPRESSION: No focal infiltrates

## 2019-06-04 NOTE — PROGRESS NOTE ADULT - ATTENDING COMMENTS
I was physically present for the key portions of the evaluation and managemnent (E/M) service provided.  I agree with the above history, physical, and plan which I have reviewed and edited where appropriate, with the exceptions as per my note.

## 2019-06-04 NOTE — PROGRESS NOTE ADULT - PROBLEM SELECTOR PLAN 8
Hx of urinary retention/overflow incontinence   - Joseph changed on 5/17   - per urology, continue w joseph    #PEG placement  - Pt s/p PEG placement 5/21   - holding Osmolite 1.2     #rectovaginal fistula  - per GYN onc, hx of recto vaginal fistula that resolved spontaneously, however now appears to have recurred  - f/u CT abd and pelvis w oral and IV contrast  - appreciate gyn recs

## 2019-06-04 NOTE — PROGRESS NOTE ADULT - ASSESSMENT
60 yo F PMHx polio, breast cancer, DM, Endometrial Cancer s/p exploratory laparotomy, enterolysis, SHAMIR, BSO, pelvic lymphadenectomy, low anterior resection mobilization of splenic flexure, end colostomy on 7/30/18, rectovaginal fistula, numerous admissions for urinary tract infection presented to [facility] in the setting of urosepsis. Hospital course complicated by ESBL E coli bacteremia (completed ertapenem on 5/14 ) and respiratory failure requiring multiple intubations 2/2 AIDP, now s/p trach and PEG (5/21), s/p treatment for sepsis 2/2 MRSA PNA w/ linezolid, now will receive plasmapheresis

## 2019-06-04 NOTE — PROGRESS NOTE ADULT - PROBLEM SELECTOR PLAN 9
-IVF: none  -Monitor, Replete to K>4 and Mg>2  -Diet: NPO given rectovaginal fistula  -DVT: Lovenox, SCDs

## 2019-06-04 NOTE — PROGRESS NOTE ADULT - ASSESSMENT
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     -Continue with plan for PLEX, patient for PermCath today  -Strength is 4-4+ most days unclear if there is waxing/waning in exam from anemia  -Patient would require PLX every 2 weeks  -HD cath greatly increases risk of infection for patient, please ensure proper central line care instructions for LTAC  -Mobilize to chair and CPAP trials as tolerated  -PT as tolerated

## 2019-06-04 NOTE — PROGRESS NOTE ADULT - SUBJECTIVE AND OBJECTIVE BOX
O/N Events: Patient Hgb was low yesterday. Given 1 unit of pRBC. Unable to get PermCath in place for PLEX.  Subjective/ROS: Denies HA, CP, SOB, n/v, changes in bowel/urinary habits.  12pt ROS otherwise negative.    VITALS  Vital Signs Last 24 Hrs  T(C): 37.1 (03 Jun 2019 21:00), Max: 37.1 (03 Jun 2019 21:00)  T(F): 98.8 (03 Jun 2019 21:00), Max: 98.8 (03 Jun 2019 21:00)  HR: 65 (04 Jun 2019 01:00) (65 - 95)  BP: 119/79 (04 Jun 2019 01:00) (116/72 - 146/81)  BP(mean): --  RR: 12 (04 Jun 2019 01:21) (12 - 18)  SpO2: 100% (04 Jun 2019 06:35) (97% - 100%)    CAPILLARY BLOOD GLUCOSE      POCT Blood Glucose.: 93 mg/dL (03 Jun 2019 23:14)  POCT Blood Glucose.: 93 mg/dL (03 Jun 2019 22:07)  POCT Blood Glucose.: 99 mg/dL (03 Jun 2019 18:12)  POCT Blood Glucose.: 118 mg/dL (03 Jun 2019 12:17)      PHYSICAL EXAM  Gen: lying comfortably in bed,  CV: RRR, S1/S2  Resp: CTABL  Neurologic:  - Mental Status:  AAOx3; speech is limited from trach but appears fluent with intact naming, repetition, and comprehension  - Cranial Nerves II-XII:    II:  PERRLA; visual fields are full to confrontation  III, IV, VI:  EOMI, no nystagmus  V:  facial sensation is intact in the V1-V3 distribution bilaterally.  VII:  face is symmetric with residual weakness on eye closure and smile  VIII:  hearing is intact to finger rub  IX, X:  uvula is midline and soft palate rises symmetrically  XI:  head turning and shoulder shrug are intact bilaterally  XII:  tongue protrudes in the midline  - Motor:  strength is 3-4/5 throughout upper extremity; Hip Flexion 2/5, Knee Extension and Flexion 1-2/5, Ankle 1-2/5;  - Reflexes: A reflexic   - Gait:  deferred    MEDICATIONS  (STANDING):  acetylcysteine 20%  Inhalation 4 milliLiter(s) Inhalation three times a day  albumin human  5% IVPB 2750 milliLiter(s) IV Intermittent once  ALBUTerol   0.5% 2.5 milliGRAM(s) Nebulizer every 8 hours  ascorbic acid 500 milliGRAM(s) Oral daily  calcium gluconate IVPB 1 Gram(s) IV Intermittent once  chlorhexidine 0.12% Liquid 15 milliLiter(s) Oral Mucosa every 12 hours  chlorhexidine 2% Cloths 1 Application(s) Topical <User Schedule>  dextrose 5%. 1000 milliLiter(s) (50 mL/Hr) IV Continuous <Continuous>  dextrose 50% Injectable 25 Gram(s) IV Push once  fludroCORTISONE 0.1 milliGRAM(s) Oral every 24 hours  heparin  flush 100 Units/mL Injectable 2000 Unit(s) IV Push once  Heparin 1000 Units / ml 2 milliLiter(s) 2 milliLiter(s) IV Push once  insulin glargine Injectable (LANTUS) 4 Unit(s) SubCutaneous at bedtime  insulin regular  human corrective regimen sliding scale   SubCutaneous every 6 hours  lactated ringers. 1000 milliLiter(s) (80 mL/Hr) IV Continuous <Continuous>  midodrine 10 milliGRAM(s) Oral every 8 hours  morphine  - Injectable 2 milliGRAM(s) IV Push once  nystatin Cream 1 Application(s) Topical two times a day  sodium chloride 0.9% for Nebulization 3 milliLiter(s) Nebulizer every 4 hours  zinc oxide 20% Ointment 1 Application(s) Topical three times a day    MEDICATIONS  (PRN):  acetaminophen    Suspension .. 1000 milliGRAM(s) Oral every 8 hours PRN Moderate Pain (4 - 6)  dextrose 40% Gel 15 Gram(s) Oral once PRN Blood Glucose LESS THAN 70 milliGRAM(s)/deciliter  glucagon  Injectable 1 milliGRAM(s) IntraMuscular once PRN Glucose LESS THAN 70 milligrams/deciliter  lidocaine 2% Gel 1 Application(s) Topical two times a day PRN pain around PEG site  sodium chloride 0.9% lock flush 10 milliLiter(s) IV Push every 1 hour PRN Pre/post blood products, medications, blood draw, and to maintain line patency      IVIG PRODUCT IS PRIGIVEN OR GAMMUNEX (Unknown)  No Known Allergies      LABS                        8.3    7.34  )-----------( 148      ( 04 Jun 2019 06:46 )             26.1     06-03    139  |  98  |  19  ----------------------------<  131<H>  3.4<L>   |  29  |  0.46<L>    Ca    9.5      03 Jun 2019 06:33  Phos  4.6     06-03  Mg     1.8     06-03      PT/INR - ( 03 Jun 2019 06:34 )   PT: 12.2 sec;   INR: 1.08          PTT - ( 03 Jun 2019 06:34 )  PTT:27.1 sec          IMAGING/EKG/ETC  EKG:  Xray:  CT:  MRI:

## 2019-06-04 NOTE — CONSULT NOTE ADULT - PROBLEM SELECTOR RECOMMENDATION 2
Current respiratory failure likely multifactorial with AIDP being a major component but also critical care related respiratory muscle myopathies and prolonged intubation.     Would continue weaning to CPAP during daytime and monitor for appropriateness of Trach collar trials.
IMPROVED - extubated on 5/2; now on nasal cannula for respiratory support  - continue supplemental oxygen for dyspnea; wean as tolerated  - remainder of management per ICU team
on antibiotic with repeat negative cultures.

## 2019-06-04 NOTE — PROGRESS NOTE ADULT - PROBLEM SELECTOR PLAN 7
Stage IV endometrial adenocarcinoma; Per GYN-ONC w/ interval increase in tumor burden. Pt was treated with Anastrazole and initiated 3 weeks of megace 80mg BID followed by 3 weeks of tamoxifen. Pt was on tamoxifen and developed a left cephalic v. thrombus. Tamoxifen was dc'd on 5/17   - Per GYN, no anticipation of continuing chemotherapy    #Hyperglycemia  - decrease to Lantus 4 U at bedtime given now stopped tube feeds  - SSI and FSG     #Anxiety   - 2/2 ongoing medical issues and prognosis  - remeron 15mg qhs prn for insomnia

## 2019-06-04 NOTE — PROGRESS NOTE ADULT - PROBLEM SELECTOR PLAN 6
Iron panel showing ACD. s/p 1u pRBC's 5/18, 1u pRBC's  5/23, and 1u pRBCs on 6/3  - Currently no source of bleeding or hemolysis  - Maintain active T&S

## 2019-06-04 NOTE — CONSULT NOTE ADULT - PROBLEM SELECTOR RECOMMENDATION 9
Now RESOLVED - secondary to ESBL P. mirabilis in urine and blood  - continue antimicrobial therapy  - monitor vital signs
Patient currently Vent dependent due to AIDP. She has been improving and was tolerating CPAP for longer periods of time.   Vent log was reviewed and Peak pressures only briefly elevated yesterday. This is in the setting of increased secretions and is likely an episode of mucous plugging.   Today pressures are mid 20s without signs of mucous plugging.     Recommendations:  - Bronchodilators with Duonebs Q6 standing  - Frequent suctioning.   - Doubt etiology is infectious in nature, but low threshold to treat for PNA if patient becomes septic  - Can D/c Mucomyst, will monitor for recurrence of mucous plugging and resume if needed.
the patient was admitted with respiratory failure and septic shock. The source was related to bacteremia from the enterovaginal fistula with E coli ESBL.  Patient was extubated off pressors and on entrapenum

## 2019-06-04 NOTE — CONSULT NOTE ADULT - CONSULT REQUESTED BY NAME
4 uris Team
Dr. Cano
Dr. Gonzalez
Dr. Keita
Dr. Keita
Dr. Robin
Dr. weber
GYN team
Luis Eduardo
[facility]
gyn

## 2019-06-04 NOTE — CONSULT NOTE ADULT - ASSESSMENT
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

## 2019-06-04 NOTE — PROGRESS NOTE ADULT - ATTENDING COMMENTS
pt with increasing secretions. mucus plugging yesterday evening.   has small amount of blood from trach site.     cont frequent suctioning  CXR (-) infiltrate  pulm consult  for HD catheter today  neurology recs   PLEX as per neurology  f/u CT a/p   f/u GYN recs

## 2019-06-04 NOTE — PROGRESS NOTE ADULT - SUBJECTIVE AND OBJECTIVE BOX
OVERNIGHT EVENTS: elevated peak pressures on vent overnight w increased secretions. given mucomyst and suctioned. Never desatted throughout episode. CXR clear in am.    SUBJECTIVE: Patient reports increased secretions but does not report feeling SOB. Feels strength is same as prior. Denies abdominal pain, CP. but does report R upper back tenderness that is relieved w massage.    Vital Signs Last 12 Hrs  T(F): 98.9 (06-04-19 @ 09:48), Max: 98.9 (06-04-19 @ 09:48)  HR: 69 (06-04-19 @ 09:48) (65 - 69)  BP: 136/83 (06-04-19 @ 09:48) (119/79 - 136/83)  BP(mean): --  RR: 18 (06-04-19 @ 09:48) (12 - 18)  SpO2: 100% (06-04-19 @ 09:48) (99% - 100%)  I&O's Summary    03 Jun 2019 07:01  -  04 Jun 2019 07:00  --------------------------------------------------------  IN: 800 mL / OUT: 400 mL / NET: 400 mL        PHYSICAL EXAM:  Constitutional: NAD, comfortable in bed.  HEENT: NC/AT, PERRLA, EOMI, no conjunctival pallor or scleral icterus, MMM  Neck: Supple, no JVD, tracheostomy in place w serosang secretions  Respiratory: on ACVC, lungs w diffuse coarse rhonchi  Cardiovascular: RRR, normal S1 and S2, no m/r/g.   Gastrointestinal: +BS, soft NTND, no guarding or rebound tenderness, no palpable masses, PEG in place - no signs of infxn, colostomy in place w brown stool  Extremities: wwp; no cyanosis, clubbing or edema.   Vascular: Pulses equal and strong throughout, R PICC line, R chest chemoport  Neurological: AAOx3, no CN deficits, strength 5/5 in UEs and 1/5 in LEs, sensation intact throughout.           LABS:                        8.3    7.34  )-----------( 148      ( 04 Jun 2019 06:46 )             26.1     06-04    142  |  102  |  17  ----------------------------<  91  3.3<L>   |  28  |  0.50    Ca    9.6      04 Jun 2019 06:46  Phos  4.6     06-03  Mg     1.8     06-04    TPro  6.8  /  Alb  3.1<L>  /  TBili  0.5  /  DBili  x   /  AST  15  /  ALT  9<L>  /  AlkPhos  72  06-04    PT/INR - ( 03 Jun 2019 06:34 )   PT: 12.2 sec;   INR: 1.08          PTT - ( 03 Jun 2019 06:34 )  PTT:27.1 sec      RADIOLOGY & ADDITIONAL TESTS:    MEDICATIONS  (STANDING):  acetylcysteine 20%  Inhalation 4 milliLiter(s) Inhalation three times a day  albumin human  5% IVPB 2750 milliLiter(s) IV Intermittent once  ALBUTerol   0.5% 2.5 milliGRAM(s) Nebulizer every 8 hours  ascorbic acid 500 milliGRAM(s) Oral daily  calcium gluconate IVPB 1 Gram(s) IV Intermittent once  chlorhexidine 0.12% Liquid 15 milliLiter(s) Oral Mucosa every 12 hours  chlorhexidine 2% Cloths 1 Application(s) Topical <User Schedule>  dextrose 5%. 1000 milliLiter(s) (50 mL/Hr) IV Continuous <Continuous>  dextrose 50% Injectable 25 Gram(s) IV Push once  fludroCORTISONE 0.1 milliGRAM(s) Oral every 24 hours  heparin 1000 units/mL 2000 Unit(s) 2000 Unit(s) IV Push once  insulin glargine Injectable (LANTUS) 4 Unit(s) SubCutaneous at bedtime  insulin regular  human corrective regimen sliding scale   SubCutaneous every 6 hours  lactated ringers. 1000 milliLiter(s) (80 mL/Hr) IV Continuous <Continuous>  midodrine 10 milliGRAM(s) Oral every 8 hours  morphine  - Injectable 2 milliGRAM(s) IV Push once  nystatin Cream 1 Application(s) Topical two times a day  sodium chloride 0.9% for Nebulization 3 milliLiter(s) Nebulizer every 4 hours  zinc oxide 20% Ointment 1 Application(s) Topical three times a day    MEDICATIONS  (PRN):  acetaminophen    Suspension .. 1000 milliGRAM(s) Oral every 8 hours PRN Moderate Pain (4 - 6)  dextrose 40% Gel 15 Gram(s) Oral once PRN Blood Glucose LESS THAN 70 milliGRAM(s)/deciliter  glucagon  Injectable 1 milliGRAM(s) IntraMuscular once PRN Glucose LESS THAN 70 milligrams/deciliter  lidocaine 2% Gel 1 Application(s) Topical two times a day PRN pain around PEG site  sodium chloride 0.9% lock flush 10 milliLiter(s) IV Push every 1 hour PRN Pre/post blood products, medications, blood draw, and to maintain line patency

## 2019-06-04 NOTE — CONSULT NOTE ADULT - PROVIDER SPECIALTY LIST ADULT
Critical Care
Gastroenterology
Infectious Disease
Intervent Radiology
Intervent Radiology
Neurology
Palliative Care
Pulmonology
Transfusion Medicine
Urology
Critical Care

## 2019-06-04 NOTE — PROGRESS NOTE ADULT - PROBLEM SELECTOR PLAN 1
2/2 to demyelinating polyneuropathy (AIDP), patient has required 3 intubations this admission, now s/p trach (5/21) and s/p 4 doses of IVIG finished 5/11 and 5 rounds of plasmapheresis (finished 5/21)  - C/w mechanical ventilation at night and cpap during day. wean as tolerated  - C/w frequent suctioning  - plasmapheresis today after HD cath placed by IR  - pulm consult for increased secretions and possible mucus plugging

## 2019-06-05 DIAGNOSIS — J04.10 ACUTE TRACHEITIS WITHOUT OBSTRUCTION: ICD-10-CM

## 2019-06-05 LAB
ANION GAP SERPL CALC-SCNC: 15 MMOL/L — SIGNIFICANT CHANGE UP (ref 5–17)
APTT BLD: 25.5 SEC — LOW (ref 27.5–36.3)
BUN SERPL-MCNC: 14 MG/DL — SIGNIFICANT CHANGE UP (ref 7–23)
CALCIUM SERPL-MCNC: 9.4 MG/DL — SIGNIFICANT CHANGE UP (ref 8.4–10.5)
CHLORIDE SERPL-SCNC: 105 MMOL/L — SIGNIFICANT CHANGE UP (ref 96–108)
CO2 SERPL-SCNC: 22 MMOL/L — SIGNIFICANT CHANGE UP (ref 22–31)
CREAT SERPL-MCNC: 0.51 MG/DL — SIGNIFICANT CHANGE UP (ref 0.5–1.3)
FIBRINOGEN PPP-MCNC: 264 MG/DL — SIGNIFICANT CHANGE UP (ref 258–438)
FIBRINOGEN PPP-MCNC: 276 MG/DL — SIGNIFICANT CHANGE UP (ref 258–438)
GLUCOSE SERPL-MCNC: 100 MG/DL — HIGH (ref 70–99)
HCT VFR BLD CALC: 25.8 % — LOW (ref 34.5–45)
HCT VFR BLD CALC: 27.7 % — LOW (ref 34.5–45)
HGB BLD-MCNC: 7.9 G/DL — LOW (ref 11.5–15.5)
HGB BLD-MCNC: 8.7 G/DL — LOW (ref 11.5–15.5)
INR BLD: 1.17 — HIGH (ref 0.88–1.16)
INR BLD: 1.18 — HIGH (ref 0.88–1.16)
MAGNESIUM SERPL-MCNC: 1.5 MG/DL — LOW (ref 1.6–2.6)
MCHC RBC-ENTMCNC: 29 PG — SIGNIFICANT CHANGE UP (ref 27–34)
MCHC RBC-ENTMCNC: 29.1 PG — SIGNIFICANT CHANGE UP (ref 27–34)
MCHC RBC-ENTMCNC: 30.6 GM/DL — LOW (ref 32–36)
MCHC RBC-ENTMCNC: 31.4 GM/DL — LOW (ref 32–36)
MCV RBC AUTO: 92.6 FL — SIGNIFICANT CHANGE UP (ref 80–100)
MCV RBC AUTO: 94.9 FL — SIGNIFICANT CHANGE UP (ref 80–100)
NRBC # BLD: 0 /100 WBCS — SIGNIFICANT CHANGE UP (ref 0–0)
NRBC # BLD: 0 /100 WBCS — SIGNIFICANT CHANGE UP (ref 0–0)
PHOSPHATE SERPL-MCNC: 3.8 MG/DL — SIGNIFICANT CHANGE UP (ref 2.5–4.5)
PLATELET # BLD AUTO: 177 K/UL — SIGNIFICANT CHANGE UP (ref 150–400)
PLATELET # BLD AUTO: 214 K/UL — SIGNIFICANT CHANGE UP (ref 150–400)
POTASSIUM SERPL-MCNC: 4 MMOL/L — SIGNIFICANT CHANGE UP (ref 3.5–5.3)
POTASSIUM SERPL-SCNC: 4 MMOL/L — SIGNIFICANT CHANGE UP (ref 3.5–5.3)
PROTHROM AB SERPL-ACNC: 13.3 SEC — HIGH (ref 10–12.9)
PROTHROM AB SERPL-ACNC: 13.4 SEC — HIGH (ref 10–12.9)
RAPID RVP RESULT: SIGNIFICANT CHANGE UP
RBC # BLD: 2.72 M/UL — LOW (ref 3.8–5.2)
RBC # BLD: 2.99 M/UL — LOW (ref 3.8–5.2)
RBC # FLD: 15.2 % — HIGH (ref 10.3–14.5)
RBC # FLD: 15.2 % — HIGH (ref 10.3–14.5)
SODIUM SERPL-SCNC: 142 MMOL/L — SIGNIFICANT CHANGE UP (ref 135–145)
WBC # BLD: 7.97 K/UL — SIGNIFICANT CHANGE UP (ref 3.8–10.5)
WBC # BLD: 8.43 K/UL — SIGNIFICANT CHANGE UP (ref 3.8–10.5)
WBC # FLD AUTO: 7.97 K/UL — SIGNIFICANT CHANGE UP (ref 3.8–10.5)
WBC # FLD AUTO: 8.43 K/UL — SIGNIFICANT CHANGE UP (ref 3.8–10.5)

## 2019-06-05 PROCEDURE — 71045 X-RAY EXAM CHEST 1 VIEW: CPT | Mod: 26

## 2019-06-05 PROCEDURE — 99231 SBSQ HOSP IP/OBS SF/LOW 25: CPT

## 2019-06-05 PROCEDURE — 71275 CT ANGIOGRAPHY CHEST: CPT | Mod: 26

## 2019-06-05 PROCEDURE — 99233 SBSQ HOSP IP/OBS HIGH 50: CPT | Mod: GC

## 2019-06-05 PROCEDURE — 74177 CT ABD & PELVIS W/CONTRAST: CPT | Mod: 26

## 2019-06-05 PROCEDURE — 99232 SBSQ HOSP IP/OBS MODERATE 35: CPT

## 2019-06-05 RX ORDER — MORPHINE SULFATE 50 MG/1
1 CAPSULE, EXTENDED RELEASE ORAL ONCE
Refills: 0 | Status: DISCONTINUED | OUTPATIENT
Start: 2019-06-05 | End: 2019-06-05

## 2019-06-05 RX ORDER — ENOXAPARIN SODIUM 100 MG/ML
40 INJECTION SUBCUTANEOUS EVERY 24 HOURS
Refills: 0 | Status: DISCONTINUED | OUTPATIENT
Start: 2019-06-05 | End: 2019-06-19

## 2019-06-05 RX ORDER — SODIUM CHLORIDE 9 MG/ML
1000 INJECTION, SOLUTION INTRAVENOUS
Refills: 0 | Status: DISCONTINUED | OUTPATIENT
Start: 2019-06-05 | End: 2019-06-07

## 2019-06-05 RX ORDER — VANCOMYCIN HCL 1 G
750 VIAL (EA) INTRAVENOUS EVERY 12 HOURS
Refills: 0 | Status: DISCONTINUED | OUTPATIENT
Start: 2019-06-06 | End: 2019-06-07

## 2019-06-05 RX ORDER — VANCOMYCIN HCL 1 G
VIAL (EA) INTRAVENOUS
Refills: 0 | Status: DISCONTINUED | OUTPATIENT
Start: 2019-06-05 | End: 2019-06-05

## 2019-06-05 RX ORDER — VANCOMYCIN HCL 1 G
VIAL (EA) INTRAVENOUS
Refills: 0 | Status: DISCONTINUED | OUTPATIENT
Start: 2019-06-05 | End: 2019-06-07

## 2019-06-05 RX ORDER — MORPHINE SULFATE 50 MG/1
1 CAPSULE, EXTENDED RELEASE ORAL ONCE
Refills: 0 | Status: DISCONTINUED | OUTPATIENT
Start: 2019-06-05 | End: 2019-06-18

## 2019-06-05 RX ORDER — IOHEXOL 300 MG/ML
30 INJECTION, SOLUTION INTRAVENOUS ONCE
Refills: 0 | Status: COMPLETED | OUTPATIENT
Start: 2019-06-05 | End: 2019-06-05

## 2019-06-05 RX ORDER — PIPERACILLIN AND TAZOBACTAM 4; .5 G/20ML; G/20ML
4.5 INJECTION, POWDER, LYOPHILIZED, FOR SOLUTION INTRAVENOUS EVERY 6 HOURS
Refills: 0 | Status: DISCONTINUED | OUTPATIENT
Start: 2019-06-05 | End: 2019-06-11

## 2019-06-05 RX ORDER — VANCOMYCIN HCL 1 G
750 VIAL (EA) INTRAVENOUS ONCE
Refills: 0 | Status: COMPLETED | OUTPATIENT
Start: 2019-06-05 | End: 2019-06-05

## 2019-06-05 RX ORDER — MAGNESIUM SULFATE 500 MG/ML
2 VIAL (ML) INJECTION ONCE
Refills: 0 | Status: COMPLETED | OUTPATIENT
Start: 2019-06-05 | End: 2019-06-05

## 2019-06-05 RX ORDER — SODIUM CHLORIDE 9 MG/ML
3 INJECTION INTRAMUSCULAR; INTRAVENOUS; SUBCUTANEOUS EVERY 4 HOURS
Refills: 0 | Status: DISCONTINUED | OUTPATIENT
Start: 2019-06-05 | End: 2019-06-19

## 2019-06-05 RX ADMIN — SODIUM CHLORIDE 80 MILLILITER(S): 9 INJECTION, SOLUTION INTRAVENOUS at 05:58

## 2019-06-05 RX ADMIN — SODIUM CHLORIDE 3 MILLILITER(S): 9 INJECTION INTRAMUSCULAR; INTRAVENOUS; SUBCUTANEOUS at 02:33

## 2019-06-05 RX ADMIN — Medication 1000 MILLIGRAM(S): at 13:12

## 2019-06-05 RX ADMIN — ZINC OXIDE 1 APPLICATION(S): 200 OINTMENT TOPICAL at 18:42

## 2019-06-05 RX ADMIN — FLUDROCORTISONE ACETATE 0.1 MILLIGRAM(S): 0.1 TABLET ORAL at 06:55

## 2019-06-05 RX ADMIN — Medication 50 GRAM(S): at 09:36

## 2019-06-05 RX ADMIN — Medication 3 MILLILITER(S): at 18:42

## 2019-06-05 RX ADMIN — Medication 1000 MILLIGRAM(S): at 14:12

## 2019-06-05 RX ADMIN — NYSTATIN CREAM 1 APPLICATION(S): 100000 CREAM TOPICAL at 06:55

## 2019-06-05 RX ADMIN — SODIUM CHLORIDE 3 MILLILITER(S): 9 INJECTION INTRAMUSCULAR; INTRAVENOUS; SUBCUTANEOUS at 06:55

## 2019-06-05 RX ADMIN — ENOXAPARIN SODIUM 40 MILLIGRAM(S): 100 INJECTION SUBCUTANEOUS at 19:47

## 2019-06-05 RX ADMIN — NYSTATIN CREAM 1 APPLICATION(S): 100000 CREAM TOPICAL at 18:45

## 2019-06-05 RX ADMIN — Medication 3 MILLILITER(S): at 06:55

## 2019-06-05 RX ADMIN — Medication 250 MILLIGRAM(S): at 22:27

## 2019-06-05 RX ADMIN — Medication 3 MILLILITER(S): at 13:11

## 2019-06-05 RX ADMIN — Medication 500 MILLIGRAM(S): at 13:12

## 2019-06-05 RX ADMIN — ZINC OXIDE 1 APPLICATION(S): 200 OINTMENT TOPICAL at 06:55

## 2019-06-05 RX ADMIN — CHLORHEXIDINE GLUCONATE 15 MILLILITER(S): 213 SOLUTION TOPICAL at 09:36

## 2019-06-05 RX ADMIN — IOHEXOL 30 MILLILITER(S): 300 INJECTION, SOLUTION INTRAVENOUS at 17:13

## 2019-06-05 RX ADMIN — MIDODRINE HYDROCHLORIDE 10 MILLIGRAM(S): 2.5 TABLET ORAL at 09:36

## 2019-06-05 RX ADMIN — CHLORHEXIDINE GLUCONATE 1 APPLICATION(S): 213 SOLUTION TOPICAL at 06:54

## 2019-06-05 RX ADMIN — PIPERACILLIN AND TAZOBACTAM 200 GRAM(S): 4; .5 INJECTION, POWDER, LYOPHILIZED, FOR SOLUTION INTRAVENOUS at 18:41

## 2019-06-05 RX ADMIN — SODIUM CHLORIDE 3 MILLILITER(S): 9 INJECTION INTRAMUSCULAR; INTRAVENOUS; SUBCUTANEOUS at 22:54

## 2019-06-05 RX ADMIN — MIDODRINE HYDROCHLORIDE 10 MILLIGRAM(S): 2.5 TABLET ORAL at 18:42

## 2019-06-05 RX ADMIN — MIDODRINE HYDROCHLORIDE 10 MILLIGRAM(S): 2.5 TABLET ORAL at 02:11

## 2019-06-05 RX ADMIN — SODIUM CHLORIDE 3 MILLILITER(S): 9 INJECTION INTRAMUSCULAR; INTRAVENOUS; SUBCUTANEOUS at 19:46

## 2019-06-05 RX ADMIN — Medication 3 MILLILITER(S): at 00:24

## 2019-06-05 RX ADMIN — SODIUM CHLORIDE 3 MILLILITER(S): 9 INJECTION INTRAMUSCULAR; INTRAVENOUS; SUBCUTANEOUS at 13:11

## 2019-06-05 RX ADMIN — CHLORHEXIDINE GLUCONATE 15 MILLILITER(S): 213 SOLUTION TOPICAL at 21:45

## 2019-06-05 NOTE — PROGRESS NOTE ADULT - SUBJECTIVE AND OBJECTIVE BOX
GYN PROGRESS NOTE    Pt seen and examined at bedside. Earlier today she had a rapid response called due to desaturation and cyanosis while working w/ OT. Patient now in for tele, CTA to r/o PE. Patient still reporting leakage of stool per vagina. Joseph draining clear urine.    MEDICATIONS  (STANDING):  albumin human  5% IVPB 2750 milliLiter(s) IV Intermittent once  ALBUTerol/ipratropium for Nebulization 3 milliLiter(s) Nebulizer every 6 hours  ascorbic acid 500 milliGRAM(s) Oral daily  calcium gluconate IVPB 1 Gram(s) IV Intermittent once  chlorhexidine 0.12% Liquid 15 milliLiter(s) Oral Mucosa every 12 hours  chlorhexidine 2% Cloths 1 Application(s) Topical <User Schedule>  dextrose 5% + lactated ringers. 1000 milliLiter(s) (80 mL/Hr) IV Continuous <Continuous>  dextrose 5%. 1000 milliLiter(s) (50 mL/Hr) IV Continuous <Continuous>  dextrose 50% Injectable 25 Gram(s) IV Push once  fludroCORTISONE 0.1 milliGRAM(s) Oral every 24 hours  heparin 1000 units/mL 2000 Unit(s) 2000 Unit(s) IV Push once  insulin regular  human corrective regimen sliding scale   SubCutaneous every 6 hours  iohexol 300 mG (iodine)/mL Oral Solution 30 milliLiter(s) Oral once  midodrine 10 milliGRAM(s) Oral every 8 hours  morphine  - Injectable 1 milliGRAM(s) IV Push once  morphine  - Injectable 2 milliGRAM(s) IV Push once  nystatin Cream 1 Application(s) Topical two times a day  sodium chloride 3%  Inhalation 3 milliLiter(s) Inhalation every 4 hours  zinc oxide 20% Ointment 1 Application(s) Topical three times a day    MEDICATIONS  (PRN):  acetaminophen    Suspension .. 1000 milliGRAM(s) Oral every 8 hours PRN Moderate Pain (4 - 6)  dextrose 40% Gel 15 Gram(s) Oral once PRN Blood Glucose LESS THAN 70 milliGRAM(s)/deciliter  glucagon  Injectable 1 milliGRAM(s) IntraMuscular once PRN Glucose LESS THAN 70 milligrams/deciliter  lidocaine 2% Gel 1 Application(s) Topical two times a day PRN pain around PEG site  sodium chloride 0.9% lock flush 10 milliLiter(s) IV Push every 1 hour PRN Pre/post blood products, medications, blood draw, and to maintain line patency      Vital Signs Last 24 Hrs  T(C): 38.1 (05 Jun 2019 12:39), Max: 38.1 (05 Jun 2019 12:39)  T(F): 100.5 (05 Jun 2019 12:39), Max: 100.5 (05 Jun 2019 12:39)  HR: 90 (05 Jun 2019 12:39) (70 - 95)  BP: 161/93 (05 Jun 2019 12:39) (124/82 - 161/93)  BP(mean): --  RR: 17 (05 Jun 2019 12:39) (17 - 21)  SpO2: 100% (05 Jun 2019 12:39) (97% - 100%)    I&O's Summary    04 Jun 2019 07:01  -  05 Jun 2019 07:00  --------------------------------------------------------  IN: 0 mL / OUT: 1000 mL / NET: -1000 mL    05 Jun 2019 07:01  -  05 Jun 2019 16:19  --------------------------------------------------------  IN: 0 mL / OUT: 600 mL / NET: -600 mL      NEURO: good strength in B/l UE, increased mobility in b/l LE   GEN: NAD, AAOx3  LUNGS: no respiratory distress, on vent  : joseph draining yellow, cloudy urine, yellow chalky discharge stained on bed linens                           8.7    7.97  )-----------( 214      ( 05 Jun 2019 07:25 )             27.7   06-05    142  |  105  |  14  ----------------------------<  100<H>  4.0   |  22  |  0.51    Ca    9.4      05 Jun 2019 07:25  Phos  3.8     06-05  Mg     1.5     06-05    TPro  6.8  /  Alb  3.1<L>  /  TBili  0.5  /  DBili  x   /  AST  15  /  ALT  9<L>  /  AlkPhos  72  06-04

## 2019-06-05 NOTE — PROGRESS NOTE ADULT - PROBLEM SELECTOR PLAN 1
2/2 to demyelinating polyneuropathy (AIDP), patient has required 3 intubations this admission, now s/p trach (5/21) and s/p 4 doses of IVIG finished 5/11 and 5 rounds of plasmapheresis (finished 5/21)  - C/w mechanical ventilation at night and cpap during day. wean as tolerated  - C/w frequent suctioning  - plasmapheresis on 6/4. plan again for 6/6 per Dr. García  - pulm consult for increased secretions and possible mucus plugging - duonetj q6h standing

## 2019-06-05 NOTE — PROVIDER CONTACT NOTE (OTHER) - SITUATION
Patient went was being seen by physical therapy when it was brought to attention that the patient had difficulty breathing. Patient was suction in which the secretions were thick and clear.

## 2019-06-05 NOTE — PROGRESS NOTE ADULT - ASSESSMENT
60yo F [id number] with stage IV endometrial AdenoCA s/p staging surgery '18 and 1 round of chemotherapy 2/19 readmitted from  [facility] with urosepsis and poor respiratory status which is now thought to be secondary to diagnosis of Guillain Hammond Syndrome/AIDP.  Hospital course notable for prolonged intubation and MICU admission, transitioned to tracheostomy and PEG tube on 5/21, now weened off pressors and transferred to chronic vent unit on [facility].   Plan was for transfer to LTACH, however unable to transfer due to lack of IV IG, thus in house for neurology recommendations for treatment.   Patient noted to hav  patient has history of enterovaginal fistula which was previously treated conservatively and appeared to have resolved per imaging and resolution of leaking per vagina. Recommend CT w/ oral contrast to rule out enterovaginal fistula. Hold tube feeds other than contrast.   Management per primary team.  1. Neuro: primary diagnosis of AIDP/GBS, s/p IVIG and plasmapheresis.  Weakness continues to improve however still with persistent respiratory weakness.  Neuro following.  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 Pulm: Tracheostomy (5/21-), O/N patient w/ increased respiratory secretions, f/u bronchoscopy  3. Cardio: On Florinef, midodrine 10mg TID, s/p levo gtt  4. FEN/GI: PEG 5/21- Repletion of electrolytes prn. Concern for fistula recurrence, please get CT abd/pelvis w/ PO contrast to rule out enterovaginal fistula.    5. : Neurogenic bladder w/ Indwelling joseph in place  When stable, consider urology consult for long term management i.e. for placement suprapubic catheter  6. ID: Afebrile currently. Switched to Linezolid 600mg BID (5/24-30), Last urine culture 5/22 reveals VRE and Sputum culture 5/22 and Bronchial wash 5/21 reveals MRSA s/p Vancomycin 1g (5/22-5/24), s/p Meropenem 5/22, s/p ertapenem ended 5/14. F/u ID recs   7. Endocrine: FS, ISS, Lantus 8u qhs (started 5/14)  8. VTE prophylaxis - Upper extremity superficial vein thrombosis noted 5/17, repeat doppler done 5/23-shows stable superficial thrombosis -Lovenox 40mg daily LE Dopplers negative   9. GYN malignancy  - s/p Anastrazole, s/p Megace 80mg BID x3 wks , s/p Tamoxifen 5/1-5/17 (stopped due to upper extremity DVT). Once patient recovers from acute neurologic issue will reassess cancer treatment options   -  Will avoid immunotherapy at this time given associated risk of autoimmune disease   - pathology serous on review  10. Derm: monitor sacrum for s/s of pressure ulcer, now stage 2- continue with zinc oxide   11. Follow up PT/OT recs   12. Social work/palliative:  DNR/DNI. Interdisciplinary meeting was done on 5/23

## 2019-06-05 NOTE — PROVIDER CONTACT NOTE (OTHER) - ACTION/TREATMENT ORDERED:
MD Valdivia from GYN aware and is at bedside.
d/w house staff, awaiting orders for tylenol as discussed in rounds  per Dr DAHL
Toradol 15mg IV; pt maintained on 3L O2
Patient was not stepped up to a higher level of care. Will continue to monitor patient for increased secretions as well as signs/symptoms of respiratory distress.

## 2019-06-05 NOTE — PROGRESS NOTE ADULT - SUBJECTIVE AND OBJECTIVE BOX
Interval Events:  Patient seen and examined at bedside.    In the AM , patient was feeling well. Vent peak pressures appeared WNL. Secretions were suctioned Q3-4 and were unchanged from day prior. At roughly 12pm she had an acute episode of respiratory distress while participating in PT. She was awake the entire time but became pale and was desaturating. She was suctioned and taken off the Vent and her sats returned to normal. When connected back to vent she was noted to have normal peak pressures. X-ray obtained after showed possible haziness on the left.      MEDICATIONS:  Pulmonary:  ALBUTerol/ipratropium for Nebulization 3 milliLiter(s) Nebulizer every 6 hours  sodium chloride 3%  Inhalation 3 milliLiter(s) Inhalation every 4 hours    Antimicrobials:    Anticoagulants:    Cardiac:  midodrine 10 milliGRAM(s) Oral every 8 hours    Endocrine:  dextrose 40% Gel 15 Gram(s) Oral once PRN  dextrose 50% Injectable 25 Gram(s) IV Push once  fludroCORTISONE 0.1 milliGRAM(s) Oral every 24 hours  glucagon  Injectable 1 milliGRAM(s) IntraMuscular once PRN  insulin regular  human corrective regimen sliding scale   SubCutaneous every 6 hours    Allergies  No Known Allergies    Intolerances    IVIG PRODUCT IS PRIGIVEN OR GAMMUNEX (Unknown)      Vital Signs Last 24 Hrs  T(C): 38.1 (05 Jun 2019 12:39), Max: 38.1 (05 Jun 2019 12:39)  T(F): 100.5 (05 Jun 2019 12:39), Max: 100.5 (05 Jun 2019 12:39)  HR: 90 (05 Jun 2019 12:39) (71 - 95)  BP: 161/93 (05 Jun 2019 12:39) (124/82 - 161/93)  BP(mean): --  RR: 17 (05 Jun 2019 12:39) (17 - 21)  SpO2: 100% (05 Jun 2019 12:39) (97% - 100%)    06-04 @ 07:01  -  06-05 @ 07:00  --------------------------------------------------------  IN: 0 mL / OUT: 1000 mL / NET: -1000 mL    06-05 @ 07:01 - 06-05 @ 17:14  --------------------------------------------------------  IN: 0 mL / OUT: 600 mL / NET: -600 mL      Mode: AC/ CMV (Assist Control/ Continuous Mandatory Ventilation)  RR (machine): 12  TV (machine): 320  FiO2: 30  PEEP: 5  ITime: 1  MAP: 11  PIP: 23    Physical Exam:  General: NAD, AAO x3  HEENT: No icterus,. Moist mucous membranes  Neck: No JVD noted. Supple, no meningismus  Cardio: S1, S2 noted, RRR. No murmurs, rubs or gallops  Resp: Rhonchorous sounds bilaterally. ? reduced breath sounds on the left. No wheezing.   Abdo: Soft, NT, bowel sounds present. No organomegaly  Extremities: No edema noted. Pulses present b/l  Neuro: AAO x3, grossly normal motor strength.  Lymphnodes: no lymphadenopathy identified.  Skin: Dry, no rashes    LABS:  CBC Full  -  ( 05 Jun 2019 07:25 )  WBC Count : 7.97 K/uL  RBC Count : 2.99 M/uL  Hemoglobin : 8.7 g/dL  Hematocrit : 27.7 %  Platelet Count - Automated : 214 K/uL  Mean Cell Volume : 92.6 fl  Mean Cell Hemoglobin : 29.1 pg  Mean Cell Hemoglobin Concentration : 31.4 gm/dL  Auto Neutrophil # : 5.33 K/uL  Auto Lymphocyte # : 1.67 K/uL  Auto Monocyte # : 0.64 K/uL  Auto Eosinophil # : 0.13 K/uL  Auto Basophil # : 0.01 K/uL  Auto Neutrophil % : 68.5 %  Auto Lymphocyte % : 21.4 %  Auto Monocyte % : 8.2 %  Auto Eosinophil % : 1.7 %  Auto Basophil % : 0.1 %    06-05    142  |  105  |  14  ----------------------------<  100<H>  4.0   |  22  |  0.51    Ca    9.4      05 Jun 2019 07:25  Phos  3.8     06-05  Mg     1.5     06-05    TPro  6.8  /  Alb  3.1<L>  /  TBili  0.5  /  DBili  x   /  AST  15  /  ALT  9<L>  /  AlkPhos  72  06-04    PT/INR - ( 05 Jun 2019 07:26 )   PT: 13.3 sec;   INR: 1.17          PTT - ( 05 Jun 2019 07:26 )  PTT:25.5 sec    RADIOLOGY & ADDITIONAL STUDIES (The following images were personally reviewed):  Reviewed.

## 2019-06-05 NOTE — PROGRESS NOTE ADULT - ASSESSMENT
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     -Continue with plan for PLEX  -can do 2 sessions q4wks plan for 3 months of treatment with re-evaluation after  -HD cath greatly increases risk of infection for patient, please ensure proper central line care instructions for LTAC  -Mobilize to chair and CPAP trials as tolerated  -PT as tolerated

## 2019-06-05 NOTE — PROGRESS NOTE ADULT - SUBJECTIVE AND OBJECTIVE BOX
GYN PROGRESS NOTE    Pt seen and examined at bedside. No events overnight. Patient still reporting leakage of stool,. She also reports increased respiratory secretions. Holding tube feeds, Joseph in place, no change in strength/mobility.     MEDICATIONS  (STANDING):  albumin human  5% IVPB 2750 milliLiter(s) IV Intermittent once  ALBUTerol/ipratropium for Nebulization 3 milliLiter(s) Nebulizer every 6 hours  ascorbic acid 500 milliGRAM(s) Oral daily  calcium gluconate IVPB 1 Gram(s) IV Intermittent once  chlorhexidine 0.12% Liquid 15 milliLiter(s) Oral Mucosa every 12 hours  chlorhexidine 2% Cloths 1 Application(s) Topical <User Schedule>  dextrose 5% + lactated ringers. 1000 milliLiter(s) (80 mL/Hr) IV Continuous <Continuous>  dextrose 5%. 1000 milliLiter(s) (50 mL/Hr) IV Continuous <Continuous>  dextrose 50% Injectable 25 Gram(s) IV Push once  fludroCORTISONE 0.1 milliGRAM(s) Oral every 24 hours  heparin 1000 units/mL 2000 Unit(s) 2000 Unit(s) IV Push once  insulin regular  human corrective regimen sliding scale   SubCutaneous every 6 hours  magnesium sulfate  IVPB 2 Gram(s) IV Intermittent once  midodrine 10 milliGRAM(s) Oral every 8 hours  morphine  - Injectable 1 milliGRAM(s) IV Push once  morphine  - Injectable 2 milliGRAM(s) IV Push once  nystatin Cream 1 Application(s) Topical two times a day  sodium chloride 0.9% for Nebulization 3 milliLiter(s) Nebulizer every 4 hours  zinc oxide 20% Ointment 1 Application(s) Topical three times a day    MEDICATIONS  (PRN):  acetaminophen    Suspension .. 1000 milliGRAM(s) Oral every 8 hours PRN Moderate Pain (4 - 6)  dextrose 40% Gel 15 Gram(s) Oral once PRN Blood Glucose LESS THAN 70 milliGRAM(s)/deciliter  glucagon  Injectable 1 milliGRAM(s) IntraMuscular once PRN Glucose LESS THAN 70 milligrams/deciliter  lidocaine 2% Gel 1 Application(s) Topical two times a day PRN pain around PEG site  sodium chloride 0.9% lock flush 10 milliLiter(s) IV Push every 1 hour PRN Pre/post blood products, medications, blood draw, and to maintain line patency      Vital Signs Last 24 Hrs  T(C): 37.4 (05 Jun 2019 07:04), Max: 37.4 (05 Jun 2019 07:04)  T(F): 99.4 (05 Jun 2019 07:04), Max: 99.4 (05 Jun 2019 07:04)  HR: 74 (05 Jun 2019 07:04) (69 - 78)  BP: 139/85 (05 Jun 2019 07:04) (124/82 - 139/85)  BP(mean): --  RR: 20 (05 Jun 2019 07:04) (18 - 21)  SpO2: 98% (05 Jun 2019 07:04) (97% - 100%)    I&O's Summary    04 Jun 2019 07:01  -  05 Jun 2019 07:00  --------------------------------------------------------  IN: 0 mL / OUT: 1000 mL / NET: -1000 mL      NEURO: good strength in B/l UE, increased mobility in b/l LE   GEN: patient appears well  LUNGS: no respiratory distress  : joseph draining yellow, cloudy urine, yellow chalky discharge stained on bed linens                            8.7    7.97  )-----------( 214      ( 05 Jun 2019 07:25 )             27.7   06-05    142  |  105  |  14  ----------------------------<  100<H>  4.0   |  22  |  0.51    Ca    9.4      05 Jun 2019 07:25  Phos  3.8     06-05  Mg     1.5     06-05    TPro  6.8  /  Alb  3.1<L>  /  TBili  0.5  /  DBili  x   /  AST  15  /  ALT  9<L>  /  AlkPhos  72  06-04

## 2019-06-05 NOTE — PROGRESS NOTE ADULT - SUBJECTIVE AND OBJECTIVE BOX
OVERNIGHT EVENTS: KESHAWN    SUBJECTIVE: Patient reports pain this am on R chest at site of HD cath placement. Feels stronger than yesterday, and excited that she can move her feet slightly.    Vital Signs Last 12 Hrs  T(F): 99.4 (06-05-19 @ 07:04), Max: 99.4 (06-05-19 @ 07:04)  HR: 74 (06-05-19 @ 07:04) (71 - 78)  BP: 139/85 (06-05-19 @ 07:04) (126/78 - 139/85)  BP(mean): --  RR: 20 (06-05-19 @ 07:04) (20 - 21)  SpO2: 98% (06-05-19 @ 07:04) (97% - 100%)  I&O's Summary    04 Jun 2019 07:01  -  05 Jun 2019 07:00  --------------------------------------------------------  IN: 0 mL / OUT: 1000 mL / NET: -1000 mL        PHYSICAL EXAM:  Constitutional: NAD, comfortable in bed.  HEENT: NC/AT, PERRLA, EOMI, no conjunctival pallor or scleral icterus, MMM  Neck: Supple, no JVD, tracheostomy in place w serosang secretions  Respiratory: on ACVC, lungs w diffuse coarse rhonchi  Cardiovascular: RRR, normal S1 and S2, no m/r/g.   Gastrointestinal: +BS, soft NTND, no guarding or rebound tenderness, no palpable masses, PEG in place - no signs of infxn, colostomy in place w brown stool  Extremities: wwp; no cyanosis, clubbing or edema.   Vascular: Pulses equal and strong throughout, R PICC line, R chest chemoport, L chest sub clavian HD cath dressing CDI w surrounding tenderness.  Neurological: AAOx3, no CN deficits, strength 5/5 in UEs and 1/5 in LEs, sensation intact throughout.         LABS:                        8.7    7.97  )-----------( 214      ( 05 Jun 2019 07:25 )             27.7     06-05    142  |  105  |  14  ----------------------------<  100<H>  4.0   |  22  |  0.51    Ca    9.4      05 Jun 2019 07:25  Phos  3.8     06-05  Mg     1.5     06-05    TPro  6.8  /  Alb  3.1<L>  /  TBili  0.5  /  DBili  x   /  AST  15  /  ALT  9<L>  /  AlkPhos  72  06-04    PT/INR - ( 05 Jun 2019 07:26 )   PT: 13.3 sec;   INR: 1.17          PTT - ( 05 Jun 2019 07:26 )  PTT:25.5 sec      RADIOLOGY & ADDITIONAL TESTS:    MEDICATIONS  (STANDING):  albumin human  5% IVPB 2750 milliLiter(s) IV Intermittent once  ALBUTerol/ipratropium for Nebulization 3 milliLiter(s) Nebulizer every 6 hours  ascorbic acid 500 milliGRAM(s) Oral daily  calcium gluconate IVPB 1 Gram(s) IV Intermittent once  chlorhexidine 0.12% Liquid 15 milliLiter(s) Oral Mucosa every 12 hours  chlorhexidine 2% Cloths 1 Application(s) Topical <User Schedule>  dextrose 5% + lactated ringers. 1000 milliLiter(s) (80 mL/Hr) IV Continuous <Continuous>  dextrose 5%. 1000 milliLiter(s) (50 mL/Hr) IV Continuous <Continuous>  dextrose 50% Injectable 25 Gram(s) IV Push once  fludroCORTISONE 0.1 milliGRAM(s) Oral every 24 hours  heparin 1000 units/mL 2000 Unit(s) 2000 Unit(s) IV Push once  insulin regular  human corrective regimen sliding scale   SubCutaneous every 6 hours  magnesium sulfate  IVPB 2 Gram(s) IV Intermittent once  midodrine 10 milliGRAM(s) Oral every 8 hours  morphine  - Injectable 1 milliGRAM(s) IV Push once  morphine  - Injectable 2 milliGRAM(s) IV Push once  nystatin Cream 1 Application(s) Topical two times a day  sodium chloride 0.9% for Nebulization 3 milliLiter(s) Nebulizer every 4 hours  zinc oxide 20% Ointment 1 Application(s) Topical three times a day    MEDICATIONS  (PRN):  acetaminophen    Suspension .. 1000 milliGRAM(s) Oral every 8 hours PRN Moderate Pain (4 - 6)  dextrose 40% Gel 15 Gram(s) Oral once PRN Blood Glucose LESS THAN 70 milliGRAM(s)/deciliter  glucagon  Injectable 1 milliGRAM(s) IntraMuscular once PRN Glucose LESS THAN 70 milligrams/deciliter  lidocaine 2% Gel 1 Application(s) Topical two times a day PRN pain around PEG site  sodium chloride 0.9% lock flush 10 milliLiter(s) IV Push every 1 hour PRN Pre/post blood products, medications, blood draw, and to maintain line patency Hospital Course:  Patient is a 59 year old female w/ post-polio syndrome, Stage IV Endometrial Carcinoma, s/p exlap, end colostomy, rectovaginal fistula w/ recurrent admissions UTIs/bacteremia, who presented from [facility] in urosepsis, intubated in ED for airway protection and admitted to MICU. Patient was weaned off levophed and was extubated on 5/2 then transferred to GYN service. Both urine and initial blood culture growing ESBL e coli sensitive to meropenem and transitioned to ertapenem. Pt then transferred to [facility] for worsening neuro exam and concern for respiratory compromise. He was started on IVIG (Privigen) 5/7. In 5/8 am, patient intubated on [facility] for hypercapneic respiratory failure and stepped up to MICU for CO2 narcosis 2/2 respiratory muscle insufficiency from AIDP. In the MICU, patient received 3 more doses of IVIG (total 4 doses) with good response. She was extubated on 5/10 and was stable until 5/13 when she was reintubated again for CO2 narcosis. Patient underwent tx w plasmapheresis. Patient was made DNR. Tracheostomy placed as patient was never fully able to wean from ventilator, though she did tolerate CPAP trials. PEG was placed on 5/21, patient with antibiotic course of vanc/meropenem for additional fever, found to have MRSA in sputum and treated as VAP with linezolid (ended 5/30). Because IVIG not available per pharmacy, patient underwent procedure for tunneled HD cath on 6/4 and underwent plasmapheresis on 6/4. Per neuro recs, patient will require plasmapheresis Qmonthly. Overnight on 6/3, patient had resp distress and likely mucus plug. On am of 6/5 patient had episode of desaturation that was likely secondary to mucus plug. Rapid response was called. This is in setting of multiple days of increased secretions.     OVERNIGHT EVENTS: KESHAWN    SUBJECTIVE: Patient reports pain this am on R chest at site of HD cath placement. Feels stronger than yesterday, and excited that she can move her feet slightly.    Vital Signs Last 12 Hrs  T(F): 99.4 (06-05-19 @ 07:04), Max: 99.4 (06-05-19 @ 07:04)  HR: 74 (06-05-19 @ 07:04) (71 - 78)  BP: 139/85 (06-05-19 @ 07:04) (126/78 - 139/85)  BP(mean): --  RR: 20 (06-05-19 @ 07:04) (20 - 21)  SpO2: 98% (06-05-19 @ 07:04) (97% - 100%)  I&O's Summary    04 Jun 2019 07:01  -  05 Jun 2019 07:00  --------------------------------------------------------  IN: 0 mL / OUT: 1000 mL / NET: -1000 mL        PHYSICAL EXAM:  Constitutional: NAD, comfortable in bed.  HEENT: NC/AT, PERRLA, EOMI, no conjunctival pallor or scleral icterus, MMM  Neck: Supple, no JVD, tracheostomy in place w serosang secretions  Respiratory: on ACVC, lungs w diffuse coarse rhonchi  Cardiovascular: RRR, normal S1 and S2, no m/r/g.   Gastrointestinal: +BS, soft NTND, no guarding or rebound tenderness, no palpable masses, PEG in place - no signs of infxn, colostomy in place w brown stool  Extremities: wwp; no cyanosis, clubbing or edema.   Vascular: Pulses equal and strong throughout, R PICC line, R chest chemoport, L chest sub clavian HD cath dressing CDI w surrounding tenderness.  Neurological: AAOx3, no CN deficits, strength 5/5 in UEs and 1/5 in LEs, sensation intact throughout.         LABS:                        8.7    7.97  )-----------( 214      ( 05 Jun 2019 07:25 )             27.7     06-05    142  |  105  |  14  ----------------------------<  100<H>  4.0   |  22  |  0.51    Ca    9.4      05 Jun 2019 07:25  Phos  3.8     06-05  Mg     1.5     06-05    TPro  6.8  /  Alb  3.1<L>  /  TBili  0.5  /  DBili  x   /  AST  15  /  ALT  9<L>  /  AlkPhos  72  06-04    PT/INR - ( 05 Jun 2019 07:26 )   PT: 13.3 sec;   INR: 1.17          PTT - ( 05 Jun 2019 07:26 )  PTT:25.5 sec      RADIOLOGY & ADDITIONAL TESTS:    MEDICATIONS  (STANDING):  albumin human  5% IVPB 2750 milliLiter(s) IV Intermittent once  ALBUTerol/ipratropium for Nebulization 3 milliLiter(s) Nebulizer every 6 hours  ascorbic acid 500 milliGRAM(s) Oral daily  calcium gluconate IVPB 1 Gram(s) IV Intermittent once  chlorhexidine 0.12% Liquid 15 milliLiter(s) Oral Mucosa every 12 hours  chlorhexidine 2% Cloths 1 Application(s) Topical <User Schedule>  dextrose 5% + lactated ringers. 1000 milliLiter(s) (80 mL/Hr) IV Continuous <Continuous>  dextrose 5%. 1000 milliLiter(s) (50 mL/Hr) IV Continuous <Continuous>  dextrose 50% Injectable 25 Gram(s) IV Push once  fludroCORTISONE 0.1 milliGRAM(s) Oral every 24 hours  heparin 1000 units/mL 2000 Unit(s) 2000 Unit(s) IV Push once  insulin regular  human corrective regimen sliding scale   SubCutaneous every 6 hours  magnesium sulfate  IVPB 2 Gram(s) IV Intermittent once  midodrine 10 milliGRAM(s) Oral every 8 hours  morphine  - Injectable 1 milliGRAM(s) IV Push once  morphine  - Injectable 2 milliGRAM(s) IV Push once  nystatin Cream 1 Application(s) Topical two times a day  sodium chloride 0.9% for Nebulization 3 milliLiter(s) Nebulizer every 4 hours  zinc oxide 20% Ointment 1 Application(s) Topical three times a day    MEDICATIONS  (PRN):  acetaminophen    Suspension .. 1000 milliGRAM(s) Oral every 8 hours PRN Moderate Pain (4 - 6)  dextrose 40% Gel 15 Gram(s) Oral once PRN Blood Glucose LESS THAN 70 milliGRAM(s)/deciliter  glucagon  Injectable 1 milliGRAM(s) IntraMuscular once PRN Glucose LESS THAN 70 milligrams/deciliter  lidocaine 2% Gel 1 Application(s) Topical two times a day PRN pain around PEG site  sodium chloride 0.9% lock flush 10 milliLiter(s) IV Push every 1 hour PRN Pre/post blood products, medications, blood draw, and to maintain line patency

## 2019-06-05 NOTE — CHART NOTE - NSCHARTNOTEFT_GEN_A_CORE
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  Pleural effusion  Muscle weakness: generalized  Malignant neoplasm: endometrium  History of total abdominal hysterectomy and bilateral salpingo-oophorectomy: with resection of endometrial mass, low anterior resection and pelvic lymphadenectomy      Current Nutrition Order: NPO      GI Issues: +colostomy, liquid output, no n/v/c    Pain: some pain noted to HD cath site     Skin Integrity: per prior skin record, stage 2 pressure ulcer noted     Labs:   06-05    142  |  105  |  14  ----------------------------<  100<H>  4.0   |  22  |  0.51    Ca    9.4      05 Jun 2019 07:25  Phos  3.8     06-05  Mg     1.5     06-05    TPro  6.8  /  Alb  3.1<L>  /  TBili  0.5  /  DBili  x   /  AST  15  /  ALT  9<L>  /  AlkPhos  72  06-04    CAPILLARY BLOOD GLUCOSE      POCT Blood Glucose.: 136 mg/dL (05 Jun 2019 12:14)  POCT Blood Glucose.: 92 mg/dL (05 Jun 2019 06:29)  POCT Blood Glucose.: 83 mg/dL (04 Jun 2019 23:54)  POCT Blood Glucose.: 91 mg/dL (04 Jun 2019 22:03)  POCT Blood Glucose.: 81 mg/dL (04 Jun 2019 17:55)      Medications:  MEDICATIONS  (STANDING):  albumin human  5% IVPB 2750 milliLiter(s) IV Intermittent once  ALBUTerol/ipratropium for Nebulization 3 milliLiter(s) Nebulizer every 6 hours  ascorbic acid 500 milliGRAM(s) Oral daily  calcium gluconate IVPB 1 Gram(s) IV Intermittent once  chlorhexidine 0.12% Liquid 15 milliLiter(s) Oral Mucosa every 12 hours  chlorhexidine 2% Cloths 1 Application(s) Topical <User Schedule>  dextrose 5% + lactated ringers. 1000 milliLiter(s) (80 mL/Hr) IV Continuous <Continuous>  dextrose 5%. 1000 milliLiter(s) (50 mL/Hr) IV Continuous <Continuous>  dextrose 50% Injectable 25 Gram(s) IV Push once  fludroCORTISONE 0.1 milliGRAM(s) Oral every 24 hours  heparin 1000 units/mL 2000 Unit(s) 2000 Unit(s) IV Push once  insulin regular  human corrective regimen sliding scale   SubCutaneous every 6 hours  midodrine 10 milliGRAM(s) Oral every 8 hours  morphine  - Injectable 1 milliGRAM(s) IV Push once  morphine  - Injectable 2 milliGRAM(s) IV Push once  nystatin Cream 1 Application(s) Topical two times a day  sodium chloride 3%  Inhalation 3 milliLiter(s) Inhalation every 4 hours  zinc oxide 20% Ointment 1 Application(s) Topical three times a day    MEDICATIONS  (PRN):  acetaminophen    Suspension .. 1000 milliGRAM(s) Oral every 8 hours PRN Moderate Pain (4 - 6)  dextrose 40% Gel 15 Gram(s) Oral once PRN Blood Glucose LESS THAN 70 milliGRAM(s)/deciliter  glucagon  Injectable 1 milliGRAM(s) IntraMuscular once PRN Glucose LESS THAN 70 milligrams/deciliter  lidocaine 2% Gel 1 Application(s) Topical two times a day PRN pain around PEG site  sodium chloride 0.9% lock flush 10 milliLiter(s) IV Push every 1 hour PRN Pre/post blood products, medications, blood draw, and to maintain line patency      Weight:   57kg (4/29)   53.3kg     Weight Change: please continue to trend wts     Nutrition Focused Physical Exam: Completed [   ]  Not Pertinent [ x  ]    Estimated energy needs:   Ideal body weight (47.7kg) used for calculations as pt >120% of IBW.   Nutrient needs based on [facility] standards of care for maintenance in older adults.   needs adjusted for age, PU stage 2, catabolic illness, vent  1190-1428kcal/day (25-30kcal/kg)  67-76g pro/day (1.4-1.6g/kg)  1428-1666ml fluid/day (30-35ml/kg)    Subjective:   55yo F w known stage IV endometrial cancer, likely with progression of disease, complex fistulae, chronic indwelling [place] admitted with fever and urosepsis. Pt intubated 2/2 acute hypoxic respiratory failure, and successfully extubated on 5/2. Pt transferred to [facility], and started on PO diet. Required reintubation on 5/8 2/2 CO2 narcosis likely d/t respiratory muscle insufficiency/ GBS/ AIDP. S/p IVIG and s/p LP. Pt extubated on 5/10, though reintubated on 5/13 for CO2 narcosis. Pt was initially declining trach/PEG and was going to be extubated, though she changed her mind and trach/PEG placed on 5/21. GOC discussion 5/23, pt made DNR/DNI however wanting to pursue nuero treatment. Moved to [facility] on 5/25 for further monitoring. Pt trached to vent was on CPAP attempting to be weaned off, trach collar trial pending as pt with episodes of apnea on vent. Able to tolerate CPAP intermittently however continues with apnea, vent placed back on ACVC mode, continues to have difficulty being weaned. Pt was stable for DC to LTAC, accepted to facilities however facilities do not do IVIG which pt requires, now pending additional options and facility for DC. Per nuero alternative option is HD cath placement with plasmapharesis [id number] for best results, per discussion in pal care rounds LTAC does not hold beds, pt will need to be readmitted to get treatment then DC back to facility same day, unclear of duration of treatment. HD cath placed yesterday, complaining of some pain around site. Now per GYN possible recto vaginal fistula noted, which has had in past and resolved spontaneously, per MD plan for CT of abd/pelvis to assess. EN stopped 6/3 d/t possible fistula, has had to be on TPN in past, if continues with NPO >5 days strongly recommend TPN to meet needs. Will continue to follow per RD protocol.     Previous Nutrition Diagnosis: Increased nutrient needs RT increased demand for kcal/pro AEB wt loss, pressure ulcer, catabolic illness     Active [ x  ]  Resolved [   ]    If resolved, new PES:     Goal: Pt to consistently meet % of estimated needs via tolerated route     Recommendations:  1. Restart EN as soon as possible (Osmolite 1.2 Bola @ 44mL/hr x 24hrs plus 1 ProStat via PEG. Provides: 1056mL TV, 1367kcal, 74g protein, 866mL free H2O, 106% RDI, 1.55g/kg IBW protein, 22.5 non pro kcal)   2. If unable to restart EN please consult for TPN   3. Monitor and replete lytes prn   4. 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  Pleural effusion  Muscle weakness: generalized  Malignant neoplasm: endometrium  History of total abdominal hysterectomy and bilateral salpingo-oophorectomy: with resection of endometrial mass, low anterior resection and pelvic lymphadenectomy      Current Nutrition Order: NPO      GI Issues: +colostomy, liquid output, no n/v/c    Pain: some pain noted to HD cath site     Skin Integrity: per prior skin record, stage 2 pressure ulcer noted     Labs:   06-05    142  |  105  |  14  ----------------------------<  100<H>  4.0   |  22  |  0.51    Ca    9.4      05 Jun 2019 07:25  Phos  3.8     06-05  Mg     1.5     06-05    TPro  6.8  /  Alb  3.1<L>  /  TBili  0.5  /  DBili  x   /  AST  15  /  ALT  9<L>  /  AlkPhos  72  06-04    CAPILLARY BLOOD GLUCOSE      POCT Blood Glucose.: 136 mg/dL (05 Jun 2019 12:14)  POCT Blood Glucose.: 92 mg/dL (05 Jun 2019 06:29)  POCT Blood Glucose.: 83 mg/dL (04 Jun 2019 23:54)  POCT Blood Glucose.: 91 mg/dL (04 Jun 2019 22:03)  POCT Blood Glucose.: 81 mg/dL (04 Jun 2019 17:55)      Medications:  MEDICATIONS  (STANDING):  albumin human  5% IVPB 2750 milliLiter(s) IV Intermittent once  ALBUTerol/ipratropium for Nebulization 3 milliLiter(s) Nebulizer every 6 hours  ascorbic acid 500 milliGRAM(s) Oral daily  calcium gluconate IVPB 1 Gram(s) IV Intermittent once  chlorhexidine 0.12% Liquid 15 milliLiter(s) Oral Mucosa every 12 hours  chlorhexidine 2% Cloths 1 Application(s) Topical <User Schedule>  dextrose 5% + lactated ringers. 1000 milliLiter(s) (80 mL/Hr) IV Continuous <Continuous>  dextrose 5%. 1000 milliLiter(s) (50 mL/Hr) IV Continuous <Continuous>  dextrose 50% Injectable 25 Gram(s) IV Push once  fludroCORTISONE 0.1 milliGRAM(s) Oral every 24 hours  heparin 1000 units/mL 2000 Unit(s) 2000 Unit(s) IV Push once  insulin regular  human corrective regimen sliding scale   SubCutaneous every 6 hours  midodrine 10 milliGRAM(s) Oral every 8 hours  morphine  - Injectable 1 milliGRAM(s) IV Push once  morphine  - Injectable 2 milliGRAM(s) IV Push once  nystatin Cream 1 Application(s) Topical two times a day  sodium chloride 3%  Inhalation 3 milliLiter(s) Inhalation every 4 hours  zinc oxide 20% Ointment 1 Application(s) Topical three times a day    MEDICATIONS  (PRN):  acetaminophen    Suspension .. 1000 milliGRAM(s) Oral every 8 hours PRN Moderate Pain (4 - 6)  dextrose 40% Gel 15 Gram(s) Oral once PRN Blood Glucose LESS THAN 70 milliGRAM(s)/deciliter  glucagon  Injectable 1 milliGRAM(s) IntraMuscular once PRN Glucose LESS THAN 70 milligrams/deciliter  lidocaine 2% Gel 1 Application(s) Topical two times a day PRN pain around PEG site  sodium chloride 0.9% lock flush 10 milliLiter(s) IV Push every 1 hour PRN Pre/post blood products, medications, blood draw, and to maintain line patency      Weight:   57kg (4/29)   53.3kg     Weight Change: please continue to trend wts     Nutrition Focused Physical Exam: Completed [   ]  Not Pertinent [ x  ]    Estimated energy needs:   ABW used for calculations as pt between % of IBW.   ABW 53.3kg, IBW 47kg, 113% IBW, ht 61", BMI 27.2   Nutrient needs based on [facility] standards of care for maintenance in older adults.   needs adjusted for age, PU stage 2, catabolic illness, vent  1599-1865kcal/day (30-35kcal/kg)  74-85g pro/day (1.4-1.6g/kg)  1599-1865ml fluid/day (30-35ml/kg)    Subjective:   53yo F w known stage IV endometrial cancer, likely with progression of disease, complex fistulae, chronic indwelling [place] admitted with fever and urosepsis. Pt intubated 2/2 acute hypoxic respiratory failure, and successfully extubated on 5/2. Pt transferred to [facility], and started on PO diet. Required reintubation on 5/8 2/2 CO2 narcosis likely d/t respiratory muscle insufficiency/ GBS/ AIDP. S/p IVIG and s/p LP. Pt extubated on 5/10, though reintubated on 5/13 for CO2 narcosis. Pt was initially declining trach/PEG and was going to be extubated, though she changed her mind and trach/PEG placed on 5/21. GOC discussion 5/23, pt made DNR/DNI however wanting to pursue nuero treatment. Moved to [facility] on 5/25 for further monitoring. Pt trached to vent was on CPAP attempting to be weaned off, trach collar trial pending as pt with episodes of apnea on vent. Able to tolerate CPAP intermittently however continues with apnea, vent placed back on ACVC mode, continues to have difficulty being weaned. Pt was stable for DC to LTAC, accepted to facilities however facilities do not do IVIG which pt requires, now pending additional options and facility for DC. Per nuero alternative option is HD cath placement with plasmapharesis [id number] for best results, per discussion in pal care rounds LTAC does not hold beds, pt will need to be readmitted to get treatment then DC back to facility same day, unclear of duration of treatment. HD cath placed yesterday, complaining of some pain around site. Now per GYN possible recto vaginal fistula noted, which has had in past and resolved spontaneously, per MD plan for CT of abd/pelvis to assess. EN stopped 6/3 d/t possible fistula, has had to be on TPN in past, if continues with NPO >5 days strongly recommend TPN to meet needs. Will continue to follow per RD protocol.     Previous Nutrition Diagnosis: Increased nutrient needs RT increased demand for kcal/pro AEB wt loss, pressure ulcer, catabolic illness     Active [ x  ]  Resolved [   ]    If resolved, new PES:     Goal: Pt to consistently meet % of estimated needs via tolerated route     Recommendations:  1. Restart EN as soon as possible (Osmolite 1.2 Bola @ 44mL/hr x 24hrs plus 1 ProStat via PEG. Provides: 1056mL TV, 1367kcal, 74g protein, 866mL free H2O, 106% RDI, 1.55g/kg IBW protein, 22.5 non pro kcal)   2. If unable to restart EN initiate TPN. Recommend TPN via central line:  258g Dex, 80g AA, 50g 20% Lipids to provide in total 1700 Kcal, 80 g protein, 3.36GIR ,1.5 g/kg ABW protein  Recommend checking TG before starting TPN and then check TG weekly. Check Mg, Phos, K daily and POC BG Q6hrs. Trend daily weights. Fluids and lytes per MD discretion. Start at 150g Dex on Day 1, 250g Dex on Day 2, and advance to goal of g Dex on Day 3.   3. Monitor and replete lytes prn   4. Trend wts     Discussed with team     Education: n/a NPO     Risk Level: High [  x ] Moderate [   ] Low [   ]

## 2019-06-05 NOTE — PROGRESS NOTE ADULT - ATTENDING COMMENTS
I was physically present for the key portions of the evaluation and managemnent (E/M) service provided.  I agree with the above history, physical, and plan which I have reviewed and edited where appropriate, with the exceptions as per my note.    Pt visibly dyspneic, RRT called. thought to be 2/2 mucous plugging vs tracheitis vs pna.    When pt stablized from general medical standpoint, would continue plan for plex.

## 2019-06-05 NOTE — PROGRESS NOTE ADULT - SUBJECTIVE AND OBJECTIVE BOX
O/N Events:  Subjective/ROS: Denies HA, CP, SOB, n/v, changes in bowel/urinary habits.  12pt ROS otherwise negative.    VITALS  Vital Signs Last 24 Hrs  T(C): 37.4 (05 Jun 2019 07:04), Max: 37.4 (05 Jun 2019 07:04)  T(F): 99.4 (05 Jun 2019 07:04), Max: 99.4 (05 Jun 2019 07:04)  HR: 74 (05 Jun 2019 07:04) (69 - 78)  BP: 139/85 (05 Jun 2019 07:04) (124/82 - 139/85)  BP(mean): --  RR: 20 (05 Jun 2019 07:04) (18 - 21)  SpO2: 98% (05 Jun 2019 07:04) (97% - 100%)    CAPILLARY BLOOD GLUCOSE      POCT Blood Glucose.: 92 mg/dL (05 Jun 2019 06:29)  POCT Blood Glucose.: 83 mg/dL (04 Jun 2019 23:54)  POCT Blood Glucose.: 91 mg/dL (04 Jun 2019 22:03)  POCT Blood Glucose.: 81 mg/dL (04 Jun 2019 17:55)  POCT Blood Glucose.: 86 mg/dL (04 Jun 2019 13:01)      PHYSICAL EXAM  Gen: lying comfortably in bed,  CV: RRR, S1/S2  Resp: CTABL  Neurologic:  - Mental Status:  AAOx3; speech is limited from trach but appears fluent with intact naming, repetition, and comprehension  - Cranial Nerves II-XII:    II:  PERRLA; visual fields are full to confrontation  III, IV, VI:  EOMI, no nystagmus  V:  facial sensation is intact in the V1-V3 distribution bilaterally.  VII:  face is symmetric with residual weakness on eye closure and smile  VIII:  hearing is intact to finger rub  IX, X:  uvula is midline and soft palate rises symmetrically  XI:  head turning and shoulder shrug are intact bilaterally  XII:  tongue protrudes in the midline  - Motor:  strength is 4-4+/5 throughout upper extremity; Hip Flexion 2/5, Knee Extension and Flexion 1-2/5, Ankle 1-2/5;  - Reflexes: A reflexic   - Gait:  deferred    MEDICATIONS  (STANDING):  albumin human  5% IVPB 2750 milliLiter(s) IV Intermittent once  ALBUTerol/ipratropium for Nebulization 3 milliLiter(s) Nebulizer every 6 hours  ascorbic acid 500 milliGRAM(s) Oral daily  calcium gluconate IVPB 1 Gram(s) IV Intermittent once  chlorhexidine 0.12% Liquid 15 milliLiter(s) Oral Mucosa every 12 hours  chlorhexidine 2% Cloths 1 Application(s) Topical <User Schedule>  dextrose 5% + lactated ringers. 1000 milliLiter(s) (80 mL/Hr) IV Continuous <Continuous>  dextrose 5%. 1000 milliLiter(s) (50 mL/Hr) IV Continuous <Continuous>  dextrose 50% Injectable 25 Gram(s) IV Push once  fludroCORTISONE 0.1 milliGRAM(s) Oral every 24 hours  heparin 1000 units/mL 2000 Unit(s) 2000 Unit(s) IV Push once  insulin regular  human corrective regimen sliding scale   SubCutaneous every 6 hours  magnesium sulfate  IVPB 2 Gram(s) IV Intermittent once  midodrine 10 milliGRAM(s) Oral every 8 hours  morphine  - Injectable 1 milliGRAM(s) IV Push once  morphine  - Injectable 2 milliGRAM(s) IV Push once  nystatin Cream 1 Application(s) Topical two times a day  sodium chloride 0.9% for Nebulization 3 milliLiter(s) Nebulizer every 4 hours  zinc oxide 20% Ointment 1 Application(s) Topical three times a day    MEDICATIONS  (PRN):  acetaminophen    Suspension .. 1000 milliGRAM(s) Oral every 8 hours PRN Moderate Pain (4 - 6)  dextrose 40% Gel 15 Gram(s) Oral once PRN Blood Glucose LESS THAN 70 milliGRAM(s)/deciliter  glucagon  Injectable 1 milliGRAM(s) IntraMuscular once PRN Glucose LESS THAN 70 milligrams/deciliter  lidocaine 2% Gel 1 Application(s) Topical two times a day PRN pain around PEG site  sodium chloride 0.9% lock flush 10 milliLiter(s) IV Push every 1 hour PRN Pre/post blood products, medications, blood draw, and to maintain line patency      IVIG PRODUCT IS PRIGIVEN OR GAMMUNEX (Unknown)  No Known Allergies      LABS                        8.7    7.97  )-----------( 214      ( 05 Jun 2019 07:25 )             27.7     06-05    142  |  105  |  14  ----------------------------<  100<H>  4.0   |  22  |  0.51    Ca    9.4      05 Jun 2019 07:25  Phos  3.8     06-05  Mg     1.5     06-05    TPro  6.8  /  Alb  3.1<L>  /  TBili  0.5  /  DBili  x   /  AST  15  /  ALT  9<L>  /  AlkPhos  72  06-04    PT/INR - ( 05 Jun 2019 07:26 )   PT: 13.3 sec;   INR: 1.17          PTT - ( 05 Jun 2019 07:26 )  PTT:25.5 sec

## 2019-06-05 NOTE — PROVIDER CONTACT NOTE (OTHER) - ASSESSMENT
Dr Patiño updated and made aware that pt was hemodynamically unstable at change of shift, blood pressure in the 60's requiring fluid boluses and pressors being restarted.  Pt asymptomatic at this time with blood sugar of 84
Pt O2 Sat= 88% on RA; Pt also c/o 8/10 abd pain;
Respiratory rate increased, patient's oxygen saturation was in the 80's and heart rate was 110. Patient suctioned by pulmonology team with resolution to the respiratory distress.

## 2019-06-05 NOTE — PROGRESS NOTE ADULT - SUBJECTIVE AND OBJECTIVE BOX
INTERVAL HPI/OVERNIGHT EVENTS:      On further ROS, patient did not have complaint of: Headache, Blurred Vision, Cough, Dyspnea, Palpitations, Abdominal Pain, N/V, Weakness, Change in Sensation.     VITAL SIGNS:  T(F): 100.5 (06-05-19 @ 12:39)  HR: 90 (06-05-19 @ 12:39)  BP: 161/93 (06-05-19 @ 12:39)  RR: 17 (06-05-19 @ 12:39)  SpO2: 100% (06-05-19 @ 12:39)    I&O:  06-04-19 @ 07:01  -  06-05-19 @ 07:00  --------------------------------------------------------  IN: 0 mL / OUT: 1000 mL / NET: -1000 mL      PHYSICAL EXAM:  Constitutional: Patient seated comfortably in bed, of appropriate color, nutrition, and hydration.   HEENT: PERRLA, Normal Range of eye movement with no complaint of diplopia, Normal Hearing  Neck: No LAD, No JVD  Back: Normal spine flexure, No CVA tenderness  Respiratory: Normal air entry, Lungs clear to auscultation b/l w/o wheeze or crepitations.   Cardiovascular: S1 and S2 present - no additional abnormal sounds or murmurs. Normal rhythm and rate of pulse.   Gastrointestinal: BS+, soft, NT/ND  Extremities: No peripheral edema  Vascular: 2+ peripheral pulses  Neurological: A/O x 3, CN V-XII grossly intact.  Psychiatric: Normal mood, normal affect  Musculoskeletal: 5/5 strength b/l upper and lower extremities  Skin: No rashes    MEDICATIONS  (STANDING):  albumin human  5% IVPB 2750 milliLiter(s) IV Intermittent once  ALBUTerol/ipratropium for Nebulization 3 milliLiter(s) Nebulizer every 6 hours  ascorbic acid 500 milliGRAM(s) Oral daily  calcium gluconate IVPB 1 Gram(s) IV Intermittent once  chlorhexidine 0.12% Liquid 15 milliLiter(s) Oral Mucosa every 12 hours  chlorhexidine 2% Cloths 1 Application(s) Topical <User Schedule>  dextrose 5% + lactated ringers. 1000 milliLiter(s) (80 mL/Hr) IV Continuous <Continuous>  dextrose 5%. 1000 milliLiter(s) (50 mL/Hr) IV Continuous <Continuous>  dextrose 50% Injectable 25 Gram(s) IV Push once  fludroCORTISONE 0.1 milliGRAM(s) Oral every 24 hours  heparin 1000 units/mL 2000 Unit(s) 2000 Unit(s) IV Push once  insulin regular  human corrective regimen sliding scale   SubCutaneous every 6 hours  iohexol 300 mG (iodine)/mL Oral Solution 30 milliLiter(s) Oral once  midodrine 10 milliGRAM(s) Oral every 8 hours  morphine  - Injectable 1 milliGRAM(s) IV Push once  morphine  - Injectable 2 milliGRAM(s) IV Push once  nystatin Cream 1 Application(s) Topical two times a day  sodium chloride 3%  Inhalation 3 milliLiter(s) Inhalation every 4 hours  zinc oxide 20% Ointment 1 Application(s) Topical three times a day    MEDICATIONS  (PRN):  acetaminophen    Suspension .. 1000 milliGRAM(s) Oral every 8 hours PRN Moderate Pain (4 - 6)  dextrose 40% Gel 15 Gram(s) Oral once PRN Blood Glucose LESS THAN 70 milliGRAM(s)/deciliter  glucagon  Injectable 1 milliGRAM(s) IntraMuscular once PRN Glucose LESS THAN 70 milligrams/deciliter  lidocaine 2% Gel 1 Application(s) Topical two times a day PRN pain around PEG site  sodium chloride 0.9% lock flush 10 milliLiter(s) IV Push every 1 hour PRN Pre/post blood products, medications, blood draw, and to maintain line patency      Allergies  No Known Allergies  Intolerances    IVIG PRODUCT IS PRIGIVEN OR GAMMUNEX (Unknown)      LABS:                        8.7    7.97  )-----------( 214      ( 05 Jun 2019 07:25 )             27.7     06-05    142  |  105  |  14  ----------------------------<  100<H>  4.0   |  22  |  0.51    Ca    9.4      05 Jun 2019 07:25  Phos  3.8     06-05  Mg     1.5     06-05    TPro  6.8  /  Alb  3.1<L>  /  TBili  0.5  /  DBili  x   /  AST  15  /  ALT  9<L>  /  AlkPhos  72  06-04    PT/INR - ( 05 Jun 2019 07:26 )   PT: 13.3 sec;   INR: 1.17       PTT - ( 05 Jun 2019 07:26 )  PTT:25.5 sec      RADIOLOGY & ADDITIONAL TESTS: INTERVAL HPI/OVERNIGHT EVENTS:  60 yo F PMHx Polio, Breast Cancer, Endometrial Cancer s/p exploratory laparotomy, enterolysis, SHAMIR, BSO, low anterior resection mobilization of splenic flexure, end colostomy on 7/30/18, rectovaginal fistula, numerous admissions for complicated urinary tract infection initially admitted for ESBL E coli bacteremia on April 29th. Course since complicated by progressive weakness, attributed to AIDP vs Polio re-activation, on a course of IVIG + PLEX, leading to numerous episodes of AMS and CO2 Narcosis, requiring intubation, and ultimately tracheostomy + PEG placement (May 21st). Bronchoscopy was performed simultaneously and BAL grew MRSA and UA w/ VRE - patient completed course of Linezolid on 5/30. Patient has continued alternating courses IVIG and PLEX for treatment of de-myelinating polyneuropathy - last round yesterday, HD catheter placed beforehand.     Today, RRT called given de-saturation and shortness of breath noted by OT who had just finished working with the patient. SpO2 70s, HR 140s at the time, and patient was pointing to chest relaying she was unable to breathe. Patient was on CPAP at the time, 10/5, FiO2 30%. She was placed back on AC/VC, FiO2 increased to 50%. Peak pressures noted to be in the 50s. Patient was suctioned, minimal return. Within minutes, patient returned to baseline, SpO2 back to 100%, and other VSS. Peak pressure normalized to 15-18. Thought was that perhaps patient had mucus plugged.     POCUS - Anteriorly, b/l lungs exhibit lung sliding and there are rare B-lines, more-so on L. Posteriorly, the L lung has e/o possible consolidative vs atelectatic pattern.   No DVT noted in LE veins.     On further ROS, patient did not have complaint of: Headache, Blurred Vision, Cough, Dyspnea, Palpitations, Abdominal Pain, N/V, Weakness, Change in Sensation.     VITAL SIGNS:  T(F): 100.5 (06-05-19 @ 12:39)  HR: 90 (06-05-19 @ 12:39)  BP: 161/93 (06-05-19 @ 12:39)  RR: 17 (06-05-19 @ 12:39)  SpO2: 100% (06-05-19 @ 12:39)    I&O:  06-04-19 @ 07:01  -  06-05-19 @ 07:00  --------------------------------------------------------  IN: 0 mL / OUT: 1000 mL / NET: -1000 mL      PHYSICAL EXAM:  Constitutional: Patient with trach + PEG, mild distress. Awake, alert, able to nod/shake to qs.   HEENT: PERRLA, Normal Range of eye movement with no complaint of diplopia, Normal Hearing  Neck: No LAD, No JVD  Back: Normal spine flexure, No CVA tenderness  Respiratory: Increased inspiratory effort. Lung sounds were decreased on L. No wheeze, unable to appreciate crackles.   Cardiovascular: S1 and S2 present - no additional abnormal sounds or murmurs. Normal rhythm and rate of pulse.   Gastrointestinal: BS+, soft, NT/ND, PEG site intact.   Extremities: Trace b/l edema   Vascular: 2+ peripheral pulses  Neurological: A/O x 3, CN V-XII grossly intact.    MEDICATIONS  (STANDING):  albumin human  5% IVPB 2750 milliLiter(s) IV Intermittent once  ALBUTerol/ipratropium for Nebulization 3 milliLiter(s) Nebulizer every 6 hours  ascorbic acid 500 milliGRAM(s) Oral daily  calcium gluconate IVPB 1 Gram(s) IV Intermittent once  chlorhexidine 0.12% Liquid 15 milliLiter(s) Oral Mucosa every 12 hours  chlorhexidine 2% Cloths 1 Application(s) Topical <User Schedule>  dextrose 5% + lactated ringers. 1000 milliLiter(s) (80 mL/Hr) IV Continuous <Continuous>  dextrose 5%. 1000 milliLiter(s) (50 mL/Hr) IV Continuous <Continuous>  dextrose 50% Injectable 25 Gram(s) IV Push once  fludroCORTISONE 0.1 milliGRAM(s) Oral every 24 hours  heparin 1000 units/mL 2000 Unit(s) 2000 Unit(s) IV Push once  insulin regular  human corrective regimen sliding scale   SubCutaneous every 6 hours  iohexol 300 mG (iodine)/mL Oral Solution 30 milliLiter(s) Oral once  midodrine 10 milliGRAM(s) Oral every 8 hours  morphine  - Injectable 1 milliGRAM(s) IV Push once  morphine  - Injectable 2 milliGRAM(s) IV Push once  nystatin Cream 1 Application(s) Topical two times a day  sodium chloride 3%  Inhalation 3 milliLiter(s) Inhalation every 4 hours  zinc oxide 20% Ointment 1 Application(s) Topical three times a day    MEDICATIONS  (PRN):  acetaminophen    Suspension .. 1000 milliGRAM(s) Oral every 8 hours PRN Moderate Pain (4 - 6)  dextrose 40% Gel 15 Gram(s) Oral once PRN Blood Glucose LESS THAN 70 milliGRAM(s)/deciliter  glucagon  Injectable 1 milliGRAM(s) IntraMuscular once PRN Glucose LESS THAN 70 milligrams/deciliter  lidocaine 2% Gel 1 Application(s) Topical two times a day PRN pain around PEG site  sodium chloride 0.9% lock flush 10 milliLiter(s) IV Push every 1 hour PRN Pre/post blood products, medications, blood draw, and to maintain line patency      Allergies  No Known Allergies  Intolerances    IVIG PRODUCT IS PRIGIVEN OR GAMMUNEX (Unknown)      LABS:                        8.7    7.97  )-----------( 214      ( 05 Jun 2019 07:25 )             27.7     06-05    142  |  105  |  14  ----------------------------<  100<H>  4.0   |  22  |  0.51    Ca    9.4      05 Jun 2019 07:25  Phos  3.8     06-05  Mg     1.5     06-05    TPro  6.8  /  Alb  3.1<L>  /  TBili  0.5  /  DBili  x   /  AST  15  /  ALT  9<L>  /  AlkPhos  72  06-04    PT/INR - ( 05 Jun 2019 07:26 )   PT: 13.3 sec;   INR: 1.17       PTT - ( 05 Jun 2019 07:26 )  PTT:25.5 sec      RADIOLOGY & ADDITIONAL TESTS:  CXR: Increased hazy opacification on L - however, film is rotated, difficult to assess.   POCUS - Anteriorly, b/l lungs exhibit lung sliding and there are rare B-lines, more-so on L. Posteriorly, the L lung has e/o possible consolidative vs atelectatic pattern.   No DVT noted in LE veins. INTERVAL HPI/OVERNIGHT EVENTS:  60 yo F PMHx Polio, Breast Cancer, Endometrial Cancer s/p exploratory laparotomy, enterolysis, SHAMIR, BSO, low anterior resection mobilization of splenic flexure, end colostomy on 7/30/18, rectovaginal fistula, numerous admissions for complicated urinary tract infection initially admitted for ESBL E coli bacteremia on April 29th. Course since complicated by progressive weakness, attributed to AIDP vs Polio re-activation, on a course of IVIG + PLEX, leading to numerous episodes of AMS and CO2 Narcosis, requiring intubation, and ultimately tracheostomy + PEG placement (May 21st). Bronchoscopy was performed simultaneously and BAL grew MRSA and UA w/ VRE - patient completed course of Linezolid on 5/30. Patient has continued alternating courses IVIG and PLEX for treatment of de-myelinating polyneuropathy - last round yesterday, HD catheter placed beforehand.     Today, RRT called given de-saturation and shortness of breath noted by OT who had just finished working with the patient. Of note, patient had similar episode overnight. SpO2 70s, HR 140s at the time, and patient was pointing to chest relaying she was unable to breathe. Patient was on CPAP at the time, 10/5, FiO2 30%. She was placed back on AC/VC, FiO2 increased to 50%. Peak pressures noted to be in the 50s. Patient was suctioned, minimal return. Within minutes, patient returned to baseline, SpO2 back to 100%, and other VSS. Peak pressure normalized to 15-18. Thought was that perhaps patient had mucus plugged. Rectal temp checked, 100.5F.     POCUS - Anteriorly, b/l lungs exhibit lung sliding and there are rare B-lines, more-so on L. Posteriorly, the L lung has e/o possible consolidative vs atelectatic pattern.   No DVT noted in LE veins.     On further ROS, patient did not have complaint of: Headache, Blurred Vision, Cough, Dyspnea, Palpitations, Abdominal Pain, N/V, Weakness, Change in Sensation.     VITAL SIGNS:  T(F): 100.5 (06-05-19 @ 12:39)  HR: 90 (06-05-19 @ 12:39)  BP: 161/93 (06-05-19 @ 12:39)  RR: 17 (06-05-19 @ 12:39)  SpO2: 100% (06-05-19 @ 12:39)    I&O:  06-04-19 @ 07:01  -  06-05-19 @ 07:00  --------------------------------------------------------  IN: 0 mL / OUT: 1000 mL / NET: -1000 mL      PHYSICAL EXAM:  Constitutional: Patient with trach + PEG, mild distress. Awake, alert, able to nod/shake to qs.   HEENT: PERRLA, Normal Range of eye movement with no complaint of diplopia, Normal Hearing  Neck: No LAD, No JVD  Back: Normal spine flexure, No CVA tenderness  Respiratory: Increased inspiratory effort. Lung sounds were decreased on L. No wheeze, unable to appreciate crackles.   Cardiovascular: S1 and S2 present - no additional abnormal sounds or murmurs. Normal rhythm and rate of pulse.   Gastrointestinal: BS+, soft, NT/ND, PEG site intact.   Extremities: Trace b/l edema   Vascular: 2+ peripheral pulses  Neurological: A/O x 3, CN V-XII grossly intact.    MEDICATIONS  (STANDING):  albumin human  5% IVPB 2750 milliLiter(s) IV Intermittent once  ALBUTerol/ipratropium for Nebulization 3 milliLiter(s) Nebulizer every 6 hours  ascorbic acid 500 milliGRAM(s) Oral daily  calcium gluconate IVPB 1 Gram(s) IV Intermittent once  chlorhexidine 0.12% Liquid 15 milliLiter(s) Oral Mucosa every 12 hours  chlorhexidine 2% Cloths 1 Application(s) Topical <User Schedule>  dextrose 5% + lactated ringers. 1000 milliLiter(s) (80 mL/Hr) IV Continuous <Continuous>  dextrose 5%. 1000 milliLiter(s) (50 mL/Hr) IV Continuous <Continuous>  dextrose 50% Injectable 25 Gram(s) IV Push once  fludroCORTISONE 0.1 milliGRAM(s) Oral every 24 hours  heparin 1000 units/mL 2000 Unit(s) 2000 Unit(s) IV Push once  insulin regular  human corrective regimen sliding scale   SubCutaneous every 6 hours  iohexol 300 mG (iodine)/mL Oral Solution 30 milliLiter(s) Oral once  midodrine 10 milliGRAM(s) Oral every 8 hours  morphine  - Injectable 1 milliGRAM(s) IV Push once  morphine  - Injectable 2 milliGRAM(s) IV Push once  nystatin Cream 1 Application(s) Topical two times a day  sodium chloride 3%  Inhalation 3 milliLiter(s) Inhalation every 4 hours  zinc oxide 20% Ointment 1 Application(s) Topical three times a day    MEDICATIONS  (PRN):  acetaminophen    Suspension .. 1000 milliGRAM(s) Oral every 8 hours PRN Moderate Pain (4 - 6)  dextrose 40% Gel 15 Gram(s) Oral once PRN Blood Glucose LESS THAN 70 milliGRAM(s)/deciliter  glucagon  Injectable 1 milliGRAM(s) IntraMuscular once PRN Glucose LESS THAN 70 milligrams/deciliter  lidocaine 2% Gel 1 Application(s) Topical two times a day PRN pain around PEG site  sodium chloride 0.9% lock flush 10 milliLiter(s) IV Push every 1 hour PRN Pre/post blood products, medications, blood draw, and to maintain line patency      Allergies  No Known Allergies  Intolerances    IVIG PRODUCT IS PRIGIVEN OR GAMMUNEX (Unknown)      LABS:                        8.7    7.97  )-----------( 214      ( 05 Jun 2019 07:25 )             27.7     06-05    142  |  105  |  14  ----------------------------<  100<H>  4.0   |  22  |  0.51    Ca    9.4      05 Jun 2019 07:25  Phos  3.8     06-05  Mg     1.5     06-05    TPro  6.8  /  Alb  3.1<L>  /  TBili  0.5  /  DBili  x   /  AST  15  /  ALT  9<L>  /  AlkPhos  72  06-04    PT/INR - ( 05 Jun 2019 07:26 )   PT: 13.3 sec;   INR: 1.17       PTT - ( 05 Jun 2019 07:26 )  PTT:25.5 sec      RADIOLOGY & ADDITIONAL TESTS:  CXR: Increased hazy opacification on L - however, film is rotated, difficult to assess.   POCUS - Anteriorly, b/l lungs exhibit lung sliding and there are rare B-lines, more-so on L. Posteriorly, the L lung has e/o possible consolidative vs atelectatic pattern.   No DVT noted in LE veins.

## 2019-06-05 NOTE — PROGRESS NOTE ADULT - ASSESSMENT
58 yo F PMHx polio, breast cancer, DM, Endometrial Cancer s/p exploratory laparotomy, enterolysis, SHAMIR, BSO, pelvic lymphadenectomy, low anterior resection mobilization of splenic flexure, end colostomy on 7/30/18, rectovaginal fistula, numerous admissions for urinary tract infection presented to [facility] in the setting of urosepsis. Hospital course complicated by ESBL E coli bacteremia (completed ertapenem on 5/14 ) and respiratory failure requiring multiple intubations 2/2 AIDP, now s/p trach and PEG (5/21), s/p treatment for sepsis 2/2 MRSA PNA w/ linezolid, now receiving 2nd round of plasmapheresis.

## 2019-06-05 NOTE — PROGRESS NOTE ADULT - ASSESSMENT
60 yo F PMHx Polio, Breast Cancer, Endometrial Cancer s/p exploratory laparotomy, enterolysis, SHAMIR, BSO, low anterior resection mobilization of splenic flexure, end colostomy on 7/30/18, rectovaginal fistula, numerous admissions for complicated urinary tract infection initially admitted for ESBL E coli bacteremia on April 29th. Course since complicated by progressive weakness, attributed to AIDP vs Polio re-activation, on a course of IVIG + PLEX, leading to numerous episodes of AMS and CO2 Narcosis, requiring intubation, and ultimately tracheostomy + PEG placement (May 21st). Bronchoscopy was performed simultaneously and BAL grew MRSA and UA w/ VRE - patient completed course of Linezolid on 5/30. Patient has continued alternating courses IVIG and PLEX for treatment of de-myelinating polyneuropathy - last round yesterday, HD catheter placed beforehand.     RRT called for hypoxia, resolved spontaneously, query mucus plugging as etiology for incident. 58 yo F PMHx Polio, Breast Cancer, Endometrial Cancer s/p exploratory laparotomy, enterolysis, SHAMIR, BSO, low anterior resection mobilization of splenic flexure, end colostomy on 7/30/18, rectovaginal fistula, numerous admissions for complicated urinary tract infection initially admitted for ESBL E coli bacteremia on April 29th. Course since complicated by progressive weakness, attributed to AIDP vs Polio re-activation, on a course of IVIG + PLEX, leading to numerous episodes of AMS and CO2 Narcosis, requiring intubation, and ultimately tracheostomy + PEG placement (May 21st). Bronchoscopy was performed simultaneously and BAL grew MRSA and UA w/ VRE - patient completed course of Linezolid on 5/30. Patient has continued alternating courses IVIG and PLEX for treatment of de-myelinating polyneuropathy - last round yesterday, HD catheter placed beforehand.     RRT called for hypoxia, resolved spontaneously, query mucus plugging as etiology for incident.       Acute Hypoxic Respiratory Distress 2/2 Likely Mucus Plugging   - Patient with h/o hypercapnic respiratory failure 2/2 progressive muscle weakness from a de-myelinating polyneuropathy now with several episodes of acute hypoxic respiratory distress, attributed to possible mucus plugging, given history of increased secretions in the last few days.   - As patient stabilized, returned her to CPAP, 10/4, FiO2 40% - and patient was stable, breathing comfortably.   - Ultrasound revealed possible L sided consolidation (vs atelectasis) - however, patient does not appear to have pneumonia, clinically. Possibility of tracheo-bronchitis, but even through tracheostomy, there does not seem to be purulence. Hold off on antibiotics for now, but consider pneumonia vs tracheo-bronchitis as possible sources should patient be febrile.   - Given recurrent episodes of desaturation and SOB and as patient has been off prophylactic A/C for 2 days, would also consider CTA PE Protocol to evaluate for possible PE.   - Patient must also be frequently suctioned, have asked that RT some q4 hrs to suction patient along with nursing staff and residents in staggered shifts. Should this be achieved, patient appears stable to remain on the RMF.

## 2019-06-05 NOTE — PROGRESS NOTE ADULT - PROBLEM SELECTOR PLAN 1
Patient currently Vent dependent due to AIDP. She has been improving and was tolerating CPAP for longer periods of time.     Vent log were reviewed after first episode and Peak pressures were elevated due to suspected mucous plug.   She was started on bronchodilators but had another episode today. Secretions suctioned today by Pulm team where thick and purulent appearing. Would treat for tracheitis but obtain good sputum sample prior to treatment.     Another possibility is intermittent obstruction by posterior membrane however this is less likely.       Recommendations:  - Continue Bronchodilators with Duonebs Q6 standing  - continue suctioning Q4.    - Agree with CT chest   - Please obtain sputum culture  - Would start treatment for presumed tracheitis with Vancomycin and zosyn. While patient has MDR pathogens and colonizers in urine, she has only grown MRSA in sputum before.

## 2019-06-05 NOTE — PROGRESS NOTE ADULT - PROBLEM SELECTOR PLAN 3
Current respiratory failure likely multifactorial with AIDP being a major component but also critical care related respiratory muscle myopathies and prolonged intubation.     Would continue weaning to CPAP during daytime and monitor for appropriateness of Trach collar trials.

## 2019-06-05 NOTE — PROVIDER CONTACT NOTE (OTHER) - BACKGROUND
5 minutes after suctioning, the patient presented with a pale skin and fell into respiratory distress. Respiratory was notified as well as the medical team. Rapid response was called.

## 2019-06-05 NOTE — PROGRESS NOTE ADULT - ASSESSMENT
60yo F [id number] with stage IV endometrial AdenoCA s/p staging surgery '18 and 1 round of chemotherapy 2/19 readmitted from  [facility] with urosepsis and poor respiratory status which is now thought to be secondary to diagnosis of Guillain Norwalk Syndrome/AIDP.  Hospital course notable for prolonged intubation and MICU admission, transitioned to tracheostomy and PEG tube on 5/21, now weened off pressors and transferred to chronic vent unit on [facility].   Plan was for transfer to [facility], however unable to transfer due to lack of IV IG, thus in house for neurology recommendations for treatment.   Patient noted to have  patient has history of enterovaginal fistula which was previously treated conservatively and appeared to have resolved per imaging and resolution of leaking per vagina. Recommend CT w/ oral contrast to rule out enterovaginal fistula. Plan to manage presumed fistula w/ conservative management and bowel rest. Avoid tube feeds, can start TPN.  Management per primary team.  1. Neuro: primary diagnosis of AIDP/GBS, s/p IVIG and plasmapheresis.  Weakness continues to improve however still with persistent respiratory weakness.  Neuro following.  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 Pulm: Tracheostomy (5/21-), s/p rapid response 6/5 for episode of cyanosis/desaturation, f/u CTA to r/o PE  3. Cardio: On Florinef, midodrine 10mg TID, s/p levo gtt  4. FEN/GI: PEG 5/21- Repletion of electrolytes prn. Concern for fistula recurrence, please get CT abd/pelvis w/ PO contrast to rule out enterovaginal fistula.    5. : Neurogenic bladder w/ Indwelling joseph in place  When stable, consider urology consult for long term management i.e. for placement suprapubic catheter  6. ID: Afebrile currently. s/p Linezolid 600mg BID (5/24-30), Last urine culture 5/22 reveals VRE and Sputum culture 5/22 and Bronchial wash 5/21 reveals MRSA s/p Vancomycin 1g (5/22-5/24), s/p Meropenem 5/22, s/p ertapenem ended 5/14. F/u ID recs   7. Endocrine: FS, ISS, Lantus 8u qhs (started 5/14)  8. VTE prophylaxis - Upper extremity superficial vein thrombosis noted 5/17, repeat doppler done 5/23-shows stable superficial thrombosis -Lovenox 40mg daily LE Dopplers negative   9. GYN malignancy  - s/p Anastrazole, s/p Megace 80mg BID x3 wks , s/p Tamoxifen 5/1-5/17 (stopped due to upper extremity DVT). Once patient recovers from acute neurologic issue will reassess cancer treatment options   -  Will avoid immunotherapy at this time given associated risk of autoimmune disease   - pathology serous on review  10. Derm: monitor sacrum for s/s of pressure ulcer, now stage 2- continue with zinc oxide   11. Follow up PT/OT recs   12. Social work/palliative:  DNR/DNI. Interdisciplinary meeting was done on 5/23

## 2019-06-06 LAB
ANION GAP SERPL CALC-SCNC: 11 MMOL/L — SIGNIFICANT CHANGE UP (ref 5–17)
APTT BLD: 28.1 SEC — SIGNIFICANT CHANGE UP (ref 27.5–36.3)
BUN SERPL-MCNC: 9 MG/DL — SIGNIFICANT CHANGE UP (ref 7–23)
CALCIUM SERPL-MCNC: 9.2 MG/DL — SIGNIFICANT CHANGE UP (ref 8.4–10.5)
CHLORIDE SERPL-SCNC: 102 MMOL/L — SIGNIFICANT CHANGE UP (ref 96–108)
CO2 SERPL-SCNC: 25 MMOL/L — SIGNIFICANT CHANGE UP (ref 22–31)
CREAT SERPL-MCNC: 0.57 MG/DL — SIGNIFICANT CHANGE UP (ref 0.5–1.3)
GLUCOSE SERPL-MCNC: 146 MG/DL — HIGH (ref 70–99)
HCT VFR BLD CALC: 24.4 % — LOW (ref 34.5–45)
HGB BLD-MCNC: 7.7 G/DL — LOW (ref 11.5–15.5)
MAGNESIUM SERPL-MCNC: 1.6 MG/DL — SIGNIFICANT CHANGE UP (ref 1.6–2.6)
MCHC RBC-ENTMCNC: 29.1 PG — SIGNIFICANT CHANGE UP (ref 27–34)
MCHC RBC-ENTMCNC: 31.6 GM/DL — LOW (ref 32–36)
MCV RBC AUTO: 92.1 FL — SIGNIFICANT CHANGE UP (ref 80–100)
NRBC # BLD: 0 /100 WBCS — SIGNIFICANT CHANGE UP (ref 0–0)
PHOSPHATE SERPL-MCNC: 3.7 MG/DL — SIGNIFICANT CHANGE UP (ref 2.5–4.5)
PLATELET # BLD AUTO: 192 K/UL — SIGNIFICANT CHANGE UP (ref 150–400)
POTASSIUM SERPL-MCNC: 3.3 MMOL/L — LOW (ref 3.5–5.3)
POTASSIUM SERPL-SCNC: 3.3 MMOL/L — LOW (ref 3.5–5.3)
RBC # BLD: 2.65 M/UL — LOW (ref 3.8–5.2)
RBC # FLD: 15.1 % — HIGH (ref 10.3–14.5)
SODIUM SERPL-SCNC: 138 MMOL/L — SIGNIFICANT CHANGE UP (ref 135–145)
WBC # BLD: 8.08 K/UL — SIGNIFICANT CHANGE UP (ref 3.8–10.5)
WBC # FLD AUTO: 8.08 K/UL — SIGNIFICANT CHANGE UP (ref 3.8–10.5)

## 2019-06-06 PROCEDURE — 99233 SBSQ HOSP IP/OBS HIGH 50: CPT | Mod: GC

## 2019-06-06 PROCEDURE — 71045 X-RAY EXAM CHEST 1 VIEW: CPT | Mod: 26

## 2019-06-06 RX ORDER — CALCIUM GLUCONATE 100 MG/ML
1 VIAL (ML) INTRAVENOUS ONCE
Refills: 0 | Status: DISCONTINUED | OUTPATIENT
Start: 2019-06-06 | End: 2019-06-19

## 2019-06-06 RX ORDER — POTASSIUM CHLORIDE 20 MEQ
40 PACKET (EA) ORAL EVERY 4 HOURS
Refills: 0 | Status: DISCONTINUED | OUTPATIENT
Start: 2019-06-06 | End: 2019-06-06

## 2019-06-06 RX ORDER — MAGNESIUM SULFATE 500 MG/ML
2 VIAL (ML) INJECTION ONCE
Refills: 0 | Status: COMPLETED | OUTPATIENT
Start: 2019-06-06 | End: 2019-06-06

## 2019-06-06 RX ORDER — ELECTROLYTE SOLUTION,INJ
1 VIAL (ML) INTRAVENOUS
Refills: 0 | Status: DISCONTINUED | OUTPATIENT
Start: 2019-06-06 | End: 2019-06-06

## 2019-06-06 RX ORDER — POTASSIUM CHLORIDE 20 MEQ
40 PACKET (EA) ORAL EVERY 4 HOURS
Refills: 0 | Status: COMPLETED | OUTPATIENT
Start: 2019-06-06 | End: 2019-06-06

## 2019-06-06 RX ORDER — ALBUMIN HUMAN 25 %
2750 VIAL (ML) INTRAVENOUS ONCE
Refills: 0 | Status: DISCONTINUED | OUTPATIENT
Start: 2019-06-06 | End: 2019-06-19

## 2019-06-06 RX ADMIN — Medication 3 MILLILITER(S): at 06:13

## 2019-06-06 RX ADMIN — PIPERACILLIN AND TAZOBACTAM 200 GRAM(S): 4; .5 INJECTION, POWDER, LYOPHILIZED, FOR SOLUTION INTRAVENOUS at 13:12

## 2019-06-06 RX ADMIN — NYSTATIN CREAM 1 APPLICATION(S): 100000 CREAM TOPICAL at 06:15

## 2019-06-06 RX ADMIN — MIDODRINE HYDROCHLORIDE 10 MILLIGRAM(S): 2.5 TABLET ORAL at 18:50

## 2019-06-06 RX ADMIN — Medication 250 MILLIGRAM(S): at 10:19

## 2019-06-06 RX ADMIN — FLUDROCORTISONE ACETATE 0.1 MILLIGRAM(S): 0.1 TABLET ORAL at 06:13

## 2019-06-06 RX ADMIN — Medication 50 GRAM(S): at 07:40

## 2019-06-06 RX ADMIN — ZINC OXIDE 1 APPLICATION(S): 200 OINTMENT TOPICAL at 01:51

## 2019-06-06 RX ADMIN — INSULIN HUMAN 2: 100 INJECTION, SOLUTION SUBCUTANEOUS at 00:59

## 2019-06-06 RX ADMIN — ZINC OXIDE 1 APPLICATION(S): 200 OINTMENT TOPICAL at 10:19

## 2019-06-06 RX ADMIN — SODIUM CHLORIDE 3 MILLILITER(S): 9 INJECTION INTRAMUSCULAR; INTRAVENOUS; SUBCUTANEOUS at 10:19

## 2019-06-06 RX ADMIN — Medication 40 MILLIEQUIVALENT(S): at 13:13

## 2019-06-06 RX ADMIN — Medication 1000 MILLIGRAM(S): at 06:38

## 2019-06-06 RX ADMIN — ENOXAPARIN SODIUM 40 MILLIGRAM(S): 100 INJECTION SUBCUTANEOUS at 18:51

## 2019-06-06 RX ADMIN — NYSTATIN CREAM 1 APPLICATION(S): 100000 CREAM TOPICAL at 18:52

## 2019-06-06 RX ADMIN — SODIUM CHLORIDE 3 MILLILITER(S): 9 INJECTION INTRAMUSCULAR; INTRAVENOUS; SUBCUTANEOUS at 18:51

## 2019-06-06 RX ADMIN — Medication 1 EACH: at 19:05

## 2019-06-06 RX ADMIN — Medication 3 MILLILITER(S): at 00:19

## 2019-06-06 RX ADMIN — MIDODRINE HYDROCHLORIDE 10 MILLIGRAM(S): 2.5 TABLET ORAL at 10:19

## 2019-06-06 RX ADMIN — Medication 3 MILLILITER(S): at 13:12

## 2019-06-06 RX ADMIN — MIDODRINE HYDROCHLORIDE 10 MILLIGRAM(S): 2.5 TABLET ORAL at 01:51

## 2019-06-06 RX ADMIN — PIPERACILLIN AND TAZOBACTAM 200 GRAM(S): 4; .5 INJECTION, POWDER, LYOPHILIZED, FOR SOLUTION INTRAVENOUS at 00:19

## 2019-06-06 RX ADMIN — INSULIN HUMAN 2: 100 INJECTION, SOLUTION SUBCUTANEOUS at 13:20

## 2019-06-06 RX ADMIN — SODIUM CHLORIDE 3 MILLILITER(S): 9 INJECTION INTRAMUSCULAR; INTRAVENOUS; SUBCUTANEOUS at 03:41

## 2019-06-06 RX ADMIN — CHLORHEXIDINE GLUCONATE 15 MILLILITER(S): 213 SOLUTION TOPICAL at 10:19

## 2019-06-06 RX ADMIN — Medication 40 MILLIEQUIVALENT(S): at 10:18

## 2019-06-06 RX ADMIN — SODIUM CHLORIDE 3 MILLILITER(S): 9 INJECTION INTRAMUSCULAR; INTRAVENOUS; SUBCUTANEOUS at 16:08

## 2019-06-06 RX ADMIN — Medication 250 MILLIGRAM(S): at 22:31

## 2019-06-06 RX ADMIN — SODIUM CHLORIDE 3 MILLILITER(S): 9 INJECTION INTRAMUSCULAR; INTRAVENOUS; SUBCUTANEOUS at 22:31

## 2019-06-06 RX ADMIN — PIPERACILLIN AND TAZOBACTAM 200 GRAM(S): 4; .5 INJECTION, POWDER, LYOPHILIZED, FOR SOLUTION INTRAVENOUS at 18:51

## 2019-06-06 RX ADMIN — Medication 1000 MILLIGRAM(S): at 07:20

## 2019-06-06 RX ADMIN — SODIUM CHLORIDE 3 MILLILITER(S): 9 INJECTION INTRAMUSCULAR; INTRAVENOUS; SUBCUTANEOUS at 06:38

## 2019-06-06 RX ADMIN — CHLORHEXIDINE GLUCONATE 15 MILLILITER(S): 213 SOLUTION TOPICAL at 22:31

## 2019-06-06 RX ADMIN — PIPERACILLIN AND TAZOBACTAM 200 GRAM(S): 4; .5 INJECTION, POWDER, LYOPHILIZED, FOR SOLUTION INTRAVENOUS at 06:13

## 2019-06-06 RX ADMIN — SODIUM CHLORIDE 80 MILLILITER(S): 9 INJECTION, SOLUTION INTRAVENOUS at 13:21

## 2019-06-06 RX ADMIN — CHLORHEXIDINE GLUCONATE 1 APPLICATION(S): 213 SOLUTION TOPICAL at 06:15

## 2019-06-06 RX ADMIN — ZINC OXIDE 1 APPLICATION(S): 200 OINTMENT TOPICAL at 18:52

## 2019-06-06 RX ADMIN — Medication 3 MILLILITER(S): at 18:51

## 2019-06-06 NOTE — PROGRESS NOTE ADULT - PROBLEM SELECTOR PLAN 1
2/2 to demyelinating polyneuropathy (AIDP), patient has required 3 intubations this admission, now s/p trach (5/21) and s/p 4 doses of IVIG finished 5/11 and 5 rounds of plasmapheresis (finished 5/21)  - C/w mechanical ventilation at night and cpap during day. wean as tolerated  - C/w frequent suctioning (q4h)  - second round of plasmapheresis on 6/4. plan again for today per Dr. García. Will likely need 2 rounds of   - pulm consult for increased secretions and possible mucus plugging - duonebs q6h standing. considering adding glycopyrollate vs scopolamine patch however caution given possibility of thickening secretions

## 2019-06-06 NOTE — PROGRESS NOTE ADULT - SUBJECTIVE AND OBJECTIVE BOX
TIM MCDANIEL             [id number]    CC:  weakness, GOC    HPI:  60 yo G0 w/ known stage IV endometrial carcinoma s/p staging surgery 7/2018 and 1 cycle of chemotherapy in 2/2019 followed by multiple admissions for management of symptomatic enterovaginal fistula, complex fistula associated urinary tract infection, bacteremia presents on referral from [facility] after fever developing fever.  Found to have severe sepsis, required intubation and treatment of UTI,.  Initially improved but then declined neurologically and required intubation for hypercapnic respiratory failure.  Was extubated and did well for several days. Required reintubation for profound weakness and again, hypercapneic respiratory failure.  Treated with plasmapheresis with improvement, but still unable to be safely weaned.  patient chose to be trached and pegged because she felt she could get better.    SUBJECTIVE:  Patient seen at bedside, she is awake and alert.  She had a rapid response yesterday when she was doing OT because she was not able to breath (as per her). A CTA was done and no PE was diagnosed. However enterovaginal fistula was demonstrated, and feeding was stopped due to it (she is NPO) and TPN was planned (will be started tonight). Mucous plaque vs tracheitis? Now on ABx .Neuro plans IVIG every 2 weeks for 3 months.  Complaining of increased secretions. Suctioning her mouth as needed. [place] in place. Complains of diarrhea since yesterday.        ROS:  DYSPNEA: No  NAUS/VOM: No  SECRETIONS: Yes, increased secretions	  AGITATION: No  Pain (Y/N): No  -Provocation/Palliation:  -Quality/Quantity:  -Radiating:  -Severity:  -Timing/Frequency:  -Impact on ADLs:    OTHER REVIEW OF SYSTEMS: Diarrhea since yesterday (as per patient)       PEx:  Vital Signs Last 24 Hrs  T(C): 37.1 (06 Jun 2019 15:32), Max: 37.3 (05 Jun 2019 17:15)  T(F): 98.7 (06 Jun 2019 15:32), Max: 99.2 (05 Jun 2019 17:15)  HR: 65 (06 Jun 2019 15:32) (61 - 97)  BP: 112/70 (06 Jun 2019 15:32) (105/75 - 138/81)  BP(mean): --  RR: 17 (06 Jun 2019 15:32) (13 - 20)  SpO2: 100% (06 Jun 2019 15:32) (99% - 100%)    General: frail female, chronically ill appearing  comfortable in bed ; appearing older than stated age, pale, engaged in conversation and writing on a pad without difficulty. Watching TV, pleasant   HEENT: moderate alopecia; conjunctival pallor;  smiles often.  Neck: supple, but appears stronger with more head control.  Able to shake head vigorously "yes" and "no" trach in place  CVS: S1/S2, RRR  Resp: on ventilator  GI: soft, mild tenderness over LLQ, no rebound,  PEG  Musc: hypotrophic muscularity and bulk, particularly of UE/hands, LE; profound kyphoscoliosis muscle wasting of left trapezius and latissimus dorsi  Neuro: awake and alert; shrugs shoulders moderate neck strength, squeezes my hand, is able to move all extremities, can move UEs more than LEs, not able to life LEs  Psych: aware of care management. states she was on TPN before, and knows why she should be on it again.   Skin: intact	     ALLERGIES: IVIG PRODUCT IS PRIGIVEN OR GAMMUNEX (Unknown)  No Known Allergies    OPIATE NAÏVE (Y/N): Yes on presentation, had received opiates earlier in admission    MEDICATIONS  (STANDING):  albumin human  5% IVPB 2750 milliLiter(s) IV Intermittent once  albumin human  5% IVPB 2750 milliLiter(s) IV Intermittent once  ALBUTerol/ipratropium for Nebulization 3 milliLiter(s) Nebulizer every 6 hours  calcium gluconate IVPB 1 Gram(s) IV Intermittent once  calcium gluconate IVPB 1 Gram(s) IV Intermittent once  chlorhexidine 0.12% Liquid 15 milliLiter(s) Oral Mucosa every 12 hours  chlorhexidine 2% Cloths 1 Application(s) Topical <User Schedule>  dextrose 5% + lactated ringers. 1000 milliLiter(s) (80 mL/Hr) IV Continuous <Continuous>  dextrose 5%. 1000 milliLiter(s) (50 mL/Hr) IV Continuous <Continuous>  dextrose 50% Injectable 25 Gram(s) IV Push once  enoxaparin Injectable 40 milliGRAM(s) SubCutaneous every 24 hours  fludroCORTISONE 0.1 milliGRAM(s) Oral every 24 hours  heparin 1000 units/mL 2000 Unit(s) 2000 Unit(s) IV Push once  heparin 1000units/mL 4000 Unit(s) 4000 Unit(s) IV Push once  insulin regular  human corrective regimen sliding scale   SubCutaneous every 6 hours  midodrine 10 milliGRAM(s) Oral every 8 hours  morphine  - Injectable 1 milliGRAM(s) IV Push once  morphine  - Injectable 2 milliGRAM(s) IV Push once  nystatin Cream 1 Application(s) Topical two times a day  Parenteral Nutrition - Adult 1 Each (63 mL/Hr) TPN Continuous <Continuous>  piperacillin/tazobactam IVPB. 4.5 Gram(s) IV Intermittent every 6 hours  sodium chloride 3%  Inhalation 3 milliLiter(s) Inhalation every 4 hours  vancomycin  IVPB 750 milliGRAM(s) IV Intermittent every 12 hours  vancomycin  IVPB      zinc oxide 20% Ointment 1 Application(s) Topical three times a day    MEDICATIONS  (PRN):  acetaminophen    Suspension .. 1000 milliGRAM(s) Oral every 8 hours PRN Moderate Pain (4 - 6)  dextrose 40% Gel 15 Gram(s) Oral once PRN Blood Glucose LESS THAN 70 milliGRAM(s)/deciliter  glucagon  Injectable 1 milliGRAM(s) IntraMuscular once PRN Glucose LESS THAN 70 milligrams/deciliter  lidocaine 2% Gel 1 Application(s) Topical two times a day PRN pain around PEG site  sodium chloride 0.9% lock flush 10 milliLiter(s) IV Push every 1 hour PRN Pre/post blood products, medications, blood draw, and to maintain line patency            LABS:                                               7.7    8.08  )-----------( 192      ( 06 Jun 2019 06:18 )             24.4   06-06    138  |  102  |  9   ----------------------------<  146<H>  3.3<L>   |  25  |  0.57    Ca    9.2      06 Jun 2019 06:18  Phos  3.7     06-06  Mg     1.6     06-06                   IMAGING: REVIEWED    CT Abd/Plv- CTA chest (June 5th):   IMPRESSION:    No pulmonary embolus.    Mild left hydroureteronephrosis due to distal obstruction from either 2.5   cm left pelvic sidewall implant and/or 6.4mm stone at the left   UVJ/bladder.    Enterovaginal fistula.    Pelvic lymphadenopathy mildly increased from prior exam.     Small amount of free fluid around the liver and spleen.   < from: Xray Chest 1 View- PORTABLE-Routine (05.24.19 @ 04:45) >  EXAM:  XR CHEST PORTABLE ROUTINE 1V                          PROCEDURE DATE:  05/24/2019      INTERPRETATION:    CXR 6/5/2019: 6/5/2019. Tracheostomy tube and bilateral central venous lines are   unchanged. Stable heart size. There is again increased markings in the   left lung which could be due to patient's rotation. Right lung is clear.   Cannot exclude a small left pleural fusion. No right pleural effusion. No   pneumothorax.    ADVANCED DIRECTIVES:     see note above, DNR, trach, PEG    DECISION MAKER: patient/self  LEGAL SURROGATE: Esha Avila (sister) [phone]    PSYCHOSOCIAL-SPIRITUAL ASSESSMENT:       Reviewed       Care plan as below	    GOALS OF CARE DISCUSSION       Palliative care info/counseling provided previously.  Awaiting arrival of Esha roblero	           Advanced Directives addressed please see Advance Care Planning Note	           See previous Palliative Medicine Notes       Documentation of GOC: Agrees with efforts to treat,     on trach and Peg-T, will be on TPN   	      AGENCY CHOICE DISCUSSED:     LTAC for weaning    REFERRALS	        Palliative Med        Unit SW/Case Mgmt        - declined       Speech/Swallow - failed dysphagia; has NGT currently       Patient/Family Support       Massage Therapy       Music Therapy       Hospice - not applicable at present, still hopeful to be treated       Nutrition       PT/OT TIM MCDANIEL             [id number]    CC:  weakness, GOC    HPI:  58 yo G0 w/ known stage IV endometrial carcinoma s/p staging surgery 7/2018 and 1 cycle of chemotherapy in 2/2019 followed by multiple admissions for management of symptomatic enterovaginal fistula, complex fistula associated urinary tract infection, bacteremia presents on referral from [facility] after fever developing fever.  Found to have severe sepsis, required intubation and treatment of UTI,.  Initially improved but then declined neurologically and required intubation for hypercapnic respiratory failure.  Was extubated and did well for several days. Required reintubation for profound weakness and again, hypercapneic respiratory failure.  Treated with plasmapheresis with improvement, but still unable to be safely weaned.  patient chose to be trached and pegged because she felt she could get better.    SUBJECTIVE:  Patient seen at bedside, she is awake and alert.  She had a rapid response yesterday when she was doing OT because she was not able to breath (as per her). A CTA was done and no PE was diagnosed. However enterovaginal fistula was demonstrated, and feeding was stopped due to it (she is NPO) and TPN was planned (will be started tonight). Mucous plaque vs tracheitis? Now on ABx .Neuro plans plasmapheresis monthly  for 3 months.  Complaining of increased secretions. Suctioning her mouth as needed. [place] in place. Complains of diarrhea since yesterday.        ROS:  DYSPNEA: No  NAUS/VOM: No  SECRETIONS: Yes, increased secretions	  AGITATION: No  Pain (Y/N): No  -Provocation/Palliation:  -Quality/Quantity:  -Radiating:  -Severity:  -Timing/Frequency:  -Impact on ADLs:    OTHER REVIEW OF SYSTEMS: Diarrhea since yesterday (as per patient)       PEx:  Vital Signs Last 24 Hrs  T(C): 37.1 (06 Jun 2019 15:32), Max: 37.3 (05 Jun 2019 17:15)  T(F): 98.7 (06 Jun 2019 15:32), Max: 99.2 (05 Jun 2019 17:15)  HR: 65 (06 Jun 2019 15:32) (61 - 97)  BP: 112/70 (06 Jun 2019 15:32) (105/75 - 138/81)  BP(mean): --  RR: 17 (06 Jun 2019 15:32) (13 - 20)  SpO2: 100% (06 Jun 2019 15:32) (99% - 100%)    General: frail female, chronically ill appearing  comfortable in bed ; appearing older than stated age, pale, engaged in conversation and writing on a pad without difficulty. Watching TV, pleasant   HEENT: moderate alopecia; conjunctival pallor;  smiles often.  Neck: supple, but appears stronger with more head control.  Able to shake head vigorously "yes" and "no" trach in place  CVS: S1/S2, RRR  Resp: on ventilator  GI: soft, mild tenderness over LLQ, no rebound,  PEG  Musc: hypotrophic muscularity and bulk, particularly of UE/hands, LE; profound kyphoscoliosis muscle wasting of left trapezius and latissimus dorsi  Neuro: awake and alert; shrugs shoulders moderate neck strength, squeezes my hand, is able to move all extremities, can move UEs more than LEs, not able to life LEs  Psych: aware of care management. states she was on TPN before, and knows why she should be on it again.   Skin: intact	     ALLERGIES: IVIG PRODUCT IS PRIGIVEN OR GAMMUNEX (Unknown)  No Known Allergies    OPIATE NAÏVE (Y/N): Yes on presentation, had received opiates earlier in admission    MEDICATIONS  (STANDING):  albumin human  5% IVPB 2750 milliLiter(s) IV Intermittent once  albumin human  5% IVPB 2750 milliLiter(s) IV Intermittent once  ALBUTerol/ipratropium for Nebulization 3 milliLiter(s) Nebulizer every 6 hours  calcium gluconate IVPB 1 Gram(s) IV Intermittent once  calcium gluconate IVPB 1 Gram(s) IV Intermittent once  chlorhexidine 0.12% Liquid 15 milliLiter(s) Oral Mucosa every 12 hours  chlorhexidine 2% Cloths 1 Application(s) Topical <User Schedule>  dextrose 5% + lactated ringers. 1000 milliLiter(s) (80 mL/Hr) IV Continuous <Continuous>  dextrose 5%. 1000 milliLiter(s) (50 mL/Hr) IV Continuous <Continuous>  dextrose 50% Injectable 25 Gram(s) IV Push once  enoxaparin Injectable 40 milliGRAM(s) SubCutaneous every 24 hours  fludroCORTISONE 0.1 milliGRAM(s) Oral every 24 hours  heparin 1000 units/mL 2000 Unit(s) 2000 Unit(s) IV Push once  heparin 1000units/mL 4000 Unit(s) 4000 Unit(s) IV Push once  insulin regular  human corrective regimen sliding scale   SubCutaneous every 6 hours  midodrine 10 milliGRAM(s) Oral every 8 hours  morphine  - Injectable 1 milliGRAM(s) IV Push once  morphine  - Injectable 2 milliGRAM(s) IV Push once  nystatin Cream 1 Application(s) Topical two times a day  Parenteral Nutrition - Adult 1 Each (63 mL/Hr) TPN Continuous <Continuous>  piperacillin/tazobactam IVPB. 4.5 Gram(s) IV Intermittent every 6 hours  sodium chloride 3%  Inhalation 3 milliLiter(s) Inhalation every 4 hours  vancomycin  IVPB 750 milliGRAM(s) IV Intermittent every 12 hours  vancomycin  IVPB      zinc oxide 20% Ointment 1 Application(s) Topical three times a day    MEDICATIONS  (PRN):  acetaminophen    Suspension .. 1000 milliGRAM(s) Oral every 8 hours PRN Moderate Pain (4 - 6)  dextrose 40% Gel 15 Gram(s) Oral once PRN Blood Glucose LESS THAN 70 milliGRAM(s)/deciliter  glucagon  Injectable 1 milliGRAM(s) IntraMuscular once PRN Glucose LESS THAN 70 milligrams/deciliter  lidocaine 2% Gel 1 Application(s) Topical two times a day PRN pain around PEG site  sodium chloride 0.9% lock flush 10 milliLiter(s) IV Push every 1 hour PRN Pre/post blood products, medications, blood draw, and to maintain line patency            LABS:                                               7.7    8.08  )-----------( 192      ( 06 Jun 2019 06:18 )             24.4   06-06    138  |  102  |  9   ----------------------------<  146<H>  3.3<L>   |  25  |  0.57    Ca    9.2      06 Jun 2019 06:18  Phos  3.7     06-06  Mg     1.6     06-06                   IMAGING: REVIEWED    CT Abd/Plv- CTA chest (June 5th):   IMPRESSION:    No pulmonary embolus.    Mild left hydroureteronephrosis due to distal obstruction from either 2.5   cm left pelvic sidewall implant and/or 6.4mm stone at the left   UVJ/bladder.    Enterovaginal fistula.    Pelvic lymphadenopathy mildly increased from prior exam.     Small amount of free fluid around the liver and spleen.   < from: Xray Chest 1 View- PORTABLE-Routine (05.24.19 @ 04:45) >  EXAM:  XR CHEST PORTABLE ROUTINE 1V                          PROCEDURE DATE:  05/24/2019      INTERPRETATION:    CXR 6/5/2019: 6/5/2019. Tracheostomy tube and bilateral central venous lines are   unchanged. Stable heart size. There is again increased markings in the   left lung which could be due to patient's rotation. Right lung is clear.   Cannot exclude a small left pleural fusion. No right pleural effusion. No   pneumothorax.    ADVANCED DIRECTIVES:     see note above, DNR, trach, PEG    DECISION MAKER: patient/self  LEGAL SURROGATE: Esha Avila (sister) [phone]    PSYCHOSOCIAL-SPIRITUAL ASSESSMENT:       Reviewed       Care plan as below	    GOALS OF CARE DISCUSSION       Palliative care info/counseling provided previously.  Awaiting arrival of Esha roblero	           Advanced Directives addressed please see Advance Care Planning Note	           See previous Palliative Medicine Notes       Documentation of GOC: Agrees with efforts to treat,     on trach and Peg-T, will be on TPN   	      AGENCY CHOICE DISCUSSED:     LTAC for weaning    REFERRALS	        Palliative Med        Unit SW/Case Mgmt        - declined       Speech/Swallow - failed dysphagia; has NGT currently       Patient/Family Support       Massage Therapy       Music Therapy       Hospice - not applicable at present, still hopeful to be treated       Nutrition       PT/OT

## 2019-06-06 NOTE — PROGRESS NOTE ADULT - PROBLEM SELECTOR PLAN 5
Per neurology, whether or not PPS is present should not be affecting her ascending/upper extremity weakness and respiratory insufficiency and is better explained by AIDP vs. CIDP  - mgmt otherwise as above in problem #2 and #3  - Still has weakness over her LEs comparing to UEs after IVIG or plasmapheresis.    She has braces at home- would be better if feet were in a position of function

## 2019-06-06 NOTE — PROGRESS NOTE ADULT - SUBJECTIVE AND OBJECTIVE BOX
Chart reviewed and patient examined. She feels stronger, although she still reports  lot of pulmonary secretions.  PE: VSS  chest: clear to auscultation Cor: Reg S1S2    Ext; no edema  Labs reviewed. Stable anemia; no coagulopathy  Plan: Final TPE today

## 2019-06-06 NOTE — PROGRESS NOTE ADULT - PROBLEM SELECTOR PLAN 4
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   - Will be on TPN.   - patient distressed by her increasing dependence, long term planning yet to be defined as currently she is planned to receive plasmapheresis Q4 weeks for 3 months period.

## 2019-06-06 NOTE — PROGRESS NOTE ADULT - ATTENDING COMMENTS
Pt seen and examined by me at beside. Agree with above with additions,   VSS, exam as above  labs reviewed   a/p:  Acute on chronic hypoxic respiratory failure-appreciate pulm recs, on Vanco/zosyn, pending sputum cx  AIDP-day 2/2 plasmaphresis, will need to return to [facility] [id number] for plasmapheresis for next 3 month.   enterovaginal fistula-appreciate gyn recs, currently on bowel rest, restated TPN.   rest of a/p as above.

## 2019-06-06 NOTE — PROGRESS NOTE ADULT - PROBLEM SELECTOR PLAN 3
responded to plasmapheresis with increased strength in upper body  - prognosis overall remains guarded given her multiple chronic comorbidities, the prospect of cytotoxic chemotherapy initiation for her malignancy per GYN, and now on ventilation and Peg-tube.  Peg-tube feeding was stopped due to detection of enterovaginal fistula on CT scan yesterday. Will undergo another plasmapheresis today.

## 2019-06-06 NOTE — PROGRESS NOTE ADULT - PROBLEM SELECTOR PLAN 6
Originally presented w/ septic shock of  source w/ bacteremia requiring pressor support and intubation -- shock now resolved. Cleared blood cultures on ABx.  - has completed antibiotic course  -also completed coarse of antibiotics following recurrence of fever and leukocytosis thoguht to be MRSA in sputum as cause  -now on vanco and zosyn after rapid response for possible tracheitis

## 2019-06-06 NOTE — PROGRESS NOTE ADULT - PROBLEM SELECTOR PLAN 9
-IVF: none  -Monitor, Replete to K>4 and Mg>2  -Diet: will start TPN given enterovaginal fistula  -DVT: Lovenox, SCDs

## 2019-06-06 NOTE — PROGRESS NOTE ADULT - PROBLEM SELECTOR PLAN 9
had a rapid response yesterday. Weaning failed before and now she is on vent. Will re-address during the next visit.

## 2019-06-06 NOTE — PROGRESS NOTE ADULT - SUBJECTIVE AND OBJECTIVE BOX
OVERNIGHT EVENTS: KESHAWN    SUBJECTIVE: Patient still feels she has a lot of secretions today. But does not report any feelings of SOB, fever, headache. In good spirits overall.    Vital Signs Last 12 Hrs  T(F): 98 (06-06-19 @ 09:13), Max: 98.5 (06-06-19 @ 05:07)  HR: 65 (06-06-19 @ 09:31) (65 - 77)  BP: 123/69 (06-06-19 @ 09:13) (105/75 - 123/69)  BP(mean): --  RR: 13 (06-06-19 @ 09:31) (13 - 20)  SpO2: 99% (06-06-19 @ 09:31) (99% - 100%)  I&O's Summary    05 Jun 2019 07:01  -  06 Jun 2019 07:00  --------------------------------------------------------  IN: 0 mL / OUT: 2080 mL / NET: -2080 mL        PHYSICAL EXAM:  Constitutional: NAD, comfortable in bed.  HEENT: NC/AT, PERRLA, EOMI, no conjunctival pallor or scleral icterus, MMM  Neck: Supple, no JVD, tracheostomy in place w serosang secretions  Respiratory: on cpap, lungs w diffuse coarse rhonchi  Cardiovascular: RRR, normal S1 and S2, no m/r/g.   Gastrointestinal: +BS, soft NTND, no guarding or rebound tenderness, no palpable masses, PEG in place - no signs of infection, colostomy in place w brown stool  : joseph in place  Extremities: wwp; no cyanosis, clubbing or edema.   Vascular: Pulses equal and strong throughout, R PICC line, R chest chemoport  Neurological: AAOx3, no CN deficits, strength 5/5 in UEs and 1/5 in LEs, sensation intact throughout.       LABS:                        7.7    8.08  )-----------( 192      ( 06 Jun 2019 06:18 )             24.4     06-06    138  |  102  |  9   ----------------------------<  146<H>  3.3<L>   |  25  |  0.57    Ca    9.2      06 Jun 2019 06:18  Phos  3.7     06-06  Mg     1.6     06-06      PT/INR - ( 05 Jun 2019 22:55 )   PT: 13.4 sec;   INR: 1.18          PTT - ( 06 Jun 2019 06:18 )  PTT:28.1 sec      RADIOLOGY & ADDITIONAL TESTS:    MEDICATIONS  (STANDING):  albumin human  5% IVPB 2750 milliLiter(s) IV Intermittent once  albumin human  5% IVPB 2750 milliLiter(s) IV Intermittent once  ALBUTerol/ipratropium for Nebulization 3 milliLiter(s) Nebulizer every 6 hours  calcium gluconate IVPB 1 Gram(s) IV Intermittent once  calcium gluconate IVPB 1 Gram(s) IV Intermittent once  chlorhexidine 0.12% Liquid 15 milliLiter(s) Oral Mucosa every 12 hours  chlorhexidine 2% Cloths 1 Application(s) Topical <User Schedule>  dextrose 5% + lactated ringers. 1000 milliLiter(s) (80 mL/Hr) IV Continuous <Continuous>  dextrose 5%. 1000 milliLiter(s) (50 mL/Hr) IV Continuous <Continuous>  dextrose 50% Injectable 25 Gram(s) IV Push once  enoxaparin Injectable 40 milliGRAM(s) SubCutaneous every 24 hours  fludroCORTISONE 0.1 milliGRAM(s) Oral every 24 hours  heparin  flush 100 Units/mL Injectable 2000 Unit(s) IV Push once  heparin 1000 units/mL 2000 Unit(s) 2000 Unit(s) IV Push once  insulin regular  human corrective regimen sliding scale   SubCutaneous every 6 hours  midodrine 10 milliGRAM(s) Oral every 8 hours  morphine  - Injectable 1 milliGRAM(s) IV Push once  morphine  - Injectable 2 milliGRAM(s) IV Push once  nystatin Cream 1 Application(s) Topical two times a day  Parenteral Nutrition - Adult 1 Each (63 mL/Hr) TPN Continuous <Continuous>  piperacillin/tazobactam IVPB. 4.5 Gram(s) IV Intermittent every 6 hours  potassium chloride   Powder 40 milliEquivalent(s) Oral every 4 hours  sodium chloride 3%  Inhalation 3 milliLiter(s) Inhalation every 4 hours  vancomycin  IVPB 750 milliGRAM(s) IV Intermittent every 12 hours  vancomycin  IVPB      zinc oxide 20% Ointment 1 Application(s) Topical three times a day    MEDICATIONS  (PRN):  acetaminophen    Suspension .. 1000 milliGRAM(s) Oral every 8 hours PRN Moderate Pain (4 - 6)  dextrose 40% Gel 15 Gram(s) Oral once PRN Blood Glucose LESS THAN 70 milliGRAM(s)/deciliter  glucagon  Injectable 1 milliGRAM(s) IntraMuscular once PRN Glucose LESS THAN 70 milligrams/deciliter  lidocaine 2% Gel 1 Application(s) Topical two times a day PRN pain around PEG site  sodium chloride 0.9% lock flush 10 milliLiter(s) IV Push every 1 hour PRN Pre/post blood products, medications, blood draw, and to maintain line patency

## 2019-06-06 NOTE — PROGRESS NOTE ADULT - PROBLEM SELECTOR PLAN 2
s/p rapid response yesterday during OT (patient developed dyspnea).  -possible etiology tracheitis vs. Mucus plaque   -now on Vancomycin and Zosyn

## 2019-06-06 NOTE — PROGRESS NOTE ADULT - PROBLEM SELECTOR PLAN 7
stage IV endometrial adenocarcinoma; per GYN-ONC w/ interval increase in tumor burden  - management per GYN-ONC  - not currently on chemotherapy secondary to performance status and active infection  - patient had demonstrated poor understanding of current malignancy status earlier in admission and we had discussed this with her. Although patient has persistent and enlarging tumor- tumor burden is not great, oncology note appreciated (possible treatment upon discharge or when functionally improved).

## 2019-06-06 NOTE — PROGRESS NOTE ADULT - ASSESSMENT
55yo F w/ known stage IV endometrial cancer, likely with progression of disease, complex fistulae, chronic indwelling Westbrook admitted with fever and urosepsis originally requiring MICU admission for septic shock and respiratory failure which improved; however course c/b progressive ascending weakness while on medical telemetry/SDU despite initiation of IVIG, which was further c/b acute hypercapnic respiratory failure requiring reintubation and readmission to MICU for further management. Had shown some improvement, but 5/13 again with hypercarbic respiratory failure requiring intubation. On Vent and trach. Currently undergoing plasmapheresis. Had a RR yesterday during OT (due to dyspnea). Palliative following for symptom management, patient/family support, and goals of care.

## 2019-06-06 NOTE — PROGRESS NOTE ADULT - PROBLEM SELECTOR PLAN 1
trach to vent.   -plan for plasmapheresis today.  -Currently complaining of increase secretions.   on frequent suctioning .   -No recommendation for scopolamine or robinul due to neuro status and anticholinergic effect.

## 2019-06-06 NOTE — PROGRESS NOTE ADULT - PROBLEM SELECTOR PLAN 3
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 s/p 4 rounds of IVIG and 5 rounds of plasmapheresis (completed 5/21). 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  - C/w plan as per above problem 1 & 2  - Neurology following  - plasmapheresis as in problem #1

## 2019-06-06 NOTE — PROGRESS NOTE ADULT - ASSESSMENT
60 yo F PMHx polio, breast cancer, DM, Endometrial Cancer s/p exploratory laparotomy, enterolysis, SHAMIR, BSO, pelvic lymphadenectomy, low anterior resection mobilization of splenic flexure, end colostomy on 7/30/18, rectovaginal fistula, numerous admissions for urinary tract infection presented to [facility] in the setting of urosepsis. Hospital course complicated by ESBL E coli bacteremia (completed ertapenem on 5/14 ) and respiratory failure requiring multiple intubations 2/2 AIDP, now s/p trach and PEG (5/21), s/p treatment for sepsis 2/2 MRSA PNA w/ linezolid, now receiving 2nd round of plasmapheresis.

## 2019-06-06 NOTE — PROGRESS NOTE ADULT - PROBLEM SELECTOR PLAN 8
Hx of urinary retention/overflow incontinence   - Joseph changed on 5/17   - ct abdomen shows mild L hydro w obstructing stone on 6/6  - per urology, continue w joseph    #PEG placement  - Pt s/p PEG placement 5/21   - holding Osmolite 1.2     #rectovaginal fistula  - per GYN onc, hx of recto vaginal fistula that resolved spontaneously, however now appears to have recurred  - f/u CT abd and pelvis w oral and IV contrast - confirmed entero vaginal fistula on CT pelvis  - appreciate gyn recs

## 2019-06-06 NOTE — PROGRESS NOTE ADULT - PROBLEM SELECTOR PLAN 8
rectovaginal and enterovaginal fistulas developed secondary to problem #6 and staging surgery in Fall 2018  -  evaluated and discussed option of supra-pubic catheter that may decrease, but not eliminate her risk/propensity for recurrent urinary infections. Management in this respect has been deferred 2/2 her more acute problems as described above.  -re-demonstration of fistula on CT of abdomen yesterday. Feeding on hold. Will be on TPN starting tonight

## 2019-06-07 LAB
ALBUMIN SERPL ELPH-MCNC: 3.7 G/DL — SIGNIFICANT CHANGE UP (ref 3.3–5)
ALP SERPL-CCNC: 27 U/L — LOW (ref 40–120)
ALT FLD-CCNC: <5 U/L — LOW (ref 10–45)
ANION GAP SERPL CALC-SCNC: 9 MMOL/L — SIGNIFICANT CHANGE UP (ref 5–17)
AST SERPL-CCNC: 10 U/L — SIGNIFICANT CHANGE UP (ref 10–40)
BILIRUB SERPL-MCNC: 0.6 MG/DL — SIGNIFICANT CHANGE UP (ref 0.2–1.2)
BUN SERPL-MCNC: 10 MG/DL — SIGNIFICANT CHANGE UP (ref 7–23)
CALCIUM SERPL-MCNC: 8.5 MG/DL — SIGNIFICANT CHANGE UP (ref 8.4–10.5)
CHLORIDE SERPL-SCNC: 104 MMOL/L — SIGNIFICANT CHANGE UP (ref 96–108)
CO2 SERPL-SCNC: 25 MMOL/L — SIGNIFICANT CHANGE UP (ref 22–31)
CREAT SERPL-MCNC: 0.54 MG/DL — SIGNIFICANT CHANGE UP (ref 0.5–1.3)
GLUCOSE SERPL-MCNC: 169 MG/DL — HIGH (ref 70–99)
GRAM STN FLD: SIGNIFICANT CHANGE UP
HCT VFR BLD CALC: 23.8 % — LOW (ref 34.5–45)
HGB BLD-MCNC: 7.3 G/DL — LOW (ref 11.5–15.5)
MAGNESIUM SERPL-MCNC: 1.6 MG/DL — SIGNIFICANT CHANGE UP (ref 1.6–2.6)
MCHC RBC-ENTMCNC: 29 PG — SIGNIFICANT CHANGE UP (ref 27–34)
MCHC RBC-ENTMCNC: 30.7 GM/DL — LOW (ref 32–36)
MCV RBC AUTO: 94.4 FL — SIGNIFICANT CHANGE UP (ref 80–100)
NRBC # BLD: 0 /100 WBCS — SIGNIFICANT CHANGE UP (ref 0–0)
PHOSPHATE SERPL-MCNC: 3.2 MG/DL — SIGNIFICANT CHANGE UP (ref 2.5–4.5)
PLATELET # BLD AUTO: 204 K/UL — SIGNIFICANT CHANGE UP (ref 150–400)
POTASSIUM SERPL-MCNC: 3.5 MMOL/L — SIGNIFICANT CHANGE UP (ref 3.5–5.3)
POTASSIUM SERPL-SCNC: 3.5 MMOL/L — SIGNIFICANT CHANGE UP (ref 3.5–5.3)
PROT SERPL-MCNC: 5.2 G/DL — LOW (ref 6–8.3)
RBC # BLD: 2.52 M/UL — LOW (ref 3.8–5.2)
RBC # FLD: 15.4 % — HIGH (ref 10.3–14.5)
SODIUM SERPL-SCNC: 138 MMOL/L — SIGNIFICANT CHANGE UP (ref 135–145)
SPECIMEN SOURCE: SIGNIFICANT CHANGE UP
VANCOMYCIN TROUGH SERPL-MCNC: 21.8 UG/ML — HIGH (ref 10–20)
WBC # BLD: 12.9 K/UL — HIGH (ref 3.8–10.5)
WBC # FLD AUTO: 12.9 K/UL — HIGH (ref 3.8–10.5)

## 2019-06-07 PROCEDURE — 99233 SBSQ HOSP IP/OBS HIGH 50: CPT | Mod: GC

## 2019-06-07 PROCEDURE — 99232 SBSQ HOSP IP/OBS MODERATE 35: CPT

## 2019-06-07 PROCEDURE — 99233 SBSQ HOSP IP/OBS HIGH 50: CPT

## 2019-06-07 RX ORDER — MAGNESIUM SULFATE 500 MG/ML
2 VIAL (ML) INJECTION ONCE
Refills: 0 | Status: COMPLETED | OUTPATIENT
Start: 2019-06-07 | End: 2019-06-07

## 2019-06-07 RX ORDER — ELECTROLYTE SOLUTION,INJ
1 VIAL (ML) INTRAVENOUS
Refills: 0 | Status: DISCONTINUED | OUTPATIENT
Start: 2019-06-07 | End: 2019-06-07

## 2019-06-07 RX ORDER — POTASSIUM CHLORIDE 20 MEQ
40 PACKET (EA) ORAL EVERY 4 HOURS
Refills: 0 | Status: COMPLETED | OUTPATIENT
Start: 2019-06-07 | End: 2019-06-07

## 2019-06-07 RX ORDER — VANCOMYCIN HCL 1 G
500 VIAL (EA) INTRAVENOUS EVERY 12 HOURS
Refills: 0 | Status: DISCONTINUED | OUTPATIENT
Start: 2019-06-07 | End: 2019-06-08

## 2019-06-07 RX ORDER — I.V. FAT EMULSION 20 G/100ML
0.94 EMULSION INTRAVENOUS
Qty: 50 | Refills: 0 | Status: DISCONTINUED | OUTPATIENT
Start: 2019-06-07 | End: 2019-06-07

## 2019-06-07 RX ADMIN — I.V. FAT EMULSION 20.83 GM/KG/DAY: 20 EMULSION INTRAVENOUS at 18:20

## 2019-06-07 RX ADMIN — SODIUM CHLORIDE 3 MILLILITER(S): 9 INJECTION INTRAMUSCULAR; INTRAVENOUS; SUBCUTANEOUS at 15:40

## 2019-06-07 RX ADMIN — SODIUM CHLORIDE 3 MILLILITER(S): 9 INJECTION INTRAMUSCULAR; INTRAVENOUS; SUBCUTANEOUS at 12:51

## 2019-06-07 RX ADMIN — PIPERACILLIN AND TAZOBACTAM 200 GRAM(S): 4; .5 INJECTION, POWDER, LYOPHILIZED, FOR SOLUTION INTRAVENOUS at 06:46

## 2019-06-07 RX ADMIN — MIDODRINE HYDROCHLORIDE 10 MILLIGRAM(S): 2.5 TABLET ORAL at 18:18

## 2019-06-07 RX ADMIN — SODIUM CHLORIDE 3 MILLILITER(S): 9 INJECTION INTRAMUSCULAR; INTRAVENOUS; SUBCUTANEOUS at 18:19

## 2019-06-07 RX ADMIN — SODIUM CHLORIDE 3 MILLILITER(S): 9 INJECTION INTRAMUSCULAR; INTRAVENOUS; SUBCUTANEOUS at 06:48

## 2019-06-07 RX ADMIN — PIPERACILLIN AND TAZOBACTAM 200 GRAM(S): 4; .5 INJECTION, POWDER, LYOPHILIZED, FOR SOLUTION INTRAVENOUS at 00:29

## 2019-06-07 RX ADMIN — Medication 100 MILLIGRAM(S): at 10:00

## 2019-06-07 RX ADMIN — Medication 3 MILLILITER(S): at 07:43

## 2019-06-07 RX ADMIN — PIPERACILLIN AND TAZOBACTAM 200 GRAM(S): 4; .5 INJECTION, POWDER, LYOPHILIZED, FOR SOLUTION INTRAVENOUS at 18:19

## 2019-06-07 RX ADMIN — Medication 1 EACH: at 18:20

## 2019-06-07 RX ADMIN — SODIUM CHLORIDE 80 MILLILITER(S): 9 INJECTION, SOLUTION INTRAVENOUS at 03:52

## 2019-06-07 RX ADMIN — NYSTATIN CREAM 1 APPLICATION(S): 100000 CREAM TOPICAL at 06:48

## 2019-06-07 RX ADMIN — Medication 40 MILLIEQUIVALENT(S): at 09:59

## 2019-06-07 RX ADMIN — CHLORHEXIDINE GLUCONATE 15 MILLILITER(S): 213 SOLUTION TOPICAL at 09:59

## 2019-06-07 RX ADMIN — ZINC OXIDE 1 APPLICATION(S): 200 OINTMENT TOPICAL at 03:21

## 2019-06-07 RX ADMIN — CHLORHEXIDINE GLUCONATE 15 MILLILITER(S): 213 SOLUTION TOPICAL at 22:02

## 2019-06-07 RX ADMIN — CHLORHEXIDINE GLUCONATE 1 APPLICATION(S): 213 SOLUTION TOPICAL at 06:49

## 2019-06-07 RX ADMIN — INSULIN HUMAN 4: 100 INJECTION, SOLUTION SUBCUTANEOUS at 00:29

## 2019-06-07 RX ADMIN — INSULIN HUMAN 2: 100 INJECTION, SOLUTION SUBCUTANEOUS at 12:50

## 2019-06-07 RX ADMIN — PIPERACILLIN AND TAZOBACTAM 200 GRAM(S): 4; .5 INJECTION, POWDER, LYOPHILIZED, FOR SOLUTION INTRAVENOUS at 12:50

## 2019-06-07 RX ADMIN — ZINC OXIDE 1 APPLICATION(S): 200 OINTMENT TOPICAL at 18:19

## 2019-06-07 RX ADMIN — Medication 3 MILLILITER(S): at 00:29

## 2019-06-07 RX ADMIN — SODIUM CHLORIDE 3 MILLILITER(S): 9 INJECTION INTRAMUSCULAR; INTRAVENOUS; SUBCUTANEOUS at 22:02

## 2019-06-07 RX ADMIN — ENOXAPARIN SODIUM 40 MILLIGRAM(S): 100 INJECTION SUBCUTANEOUS at 18:42

## 2019-06-07 RX ADMIN — ZINC OXIDE 1 APPLICATION(S): 200 OINTMENT TOPICAL at 10:00

## 2019-06-07 RX ADMIN — Medication 1000 MILLIGRAM(S): at 00:41

## 2019-06-07 RX ADMIN — MIDODRINE HYDROCHLORIDE 10 MILLIGRAM(S): 2.5 TABLET ORAL at 09:59

## 2019-06-07 RX ADMIN — INSULIN HUMAN 2: 100 INJECTION, SOLUTION SUBCUTANEOUS at 06:47

## 2019-06-07 RX ADMIN — MIDODRINE HYDROCHLORIDE 10 MILLIGRAM(S): 2.5 TABLET ORAL at 03:20

## 2019-06-07 RX ADMIN — SODIUM CHLORIDE 3 MILLILITER(S): 9 INJECTION INTRAMUSCULAR; INTRAVENOUS; SUBCUTANEOUS at 03:20

## 2019-06-07 RX ADMIN — Medication 1000 MILLIGRAM(S): at 01:11

## 2019-06-07 RX ADMIN — FLUDROCORTISONE ACETATE 0.1 MILLIGRAM(S): 0.1 TABLET ORAL at 06:47

## 2019-06-07 RX ADMIN — Medication 3 MILLILITER(S): at 18:18

## 2019-06-07 RX ADMIN — Medication 3 MILLILITER(S): at 12:51

## 2019-06-07 RX ADMIN — Medication 40 MILLIEQUIVALENT(S): at 13:28

## 2019-06-07 RX ADMIN — Medication 50 GRAM(S): at 10:00

## 2019-06-07 RX ADMIN — Medication 100 MILLIGRAM(S): at 22:02

## 2019-06-07 RX ADMIN — NYSTATIN CREAM 1 APPLICATION(S): 100000 CREAM TOPICAL at 18:19

## 2019-06-07 NOTE — PROGRESS NOTE ADULT - PROBLEM SELECTOR PLAN 8
Hx of urinary retention/overflow incontinence   - Joseph changed on 5/17   - ct abdomen shows mild L hydro w obstructing stone on 6/6  - per urology, continue w joseph    #PEG placement  - Pt s/p PEG placement 5/21   - holding Osmolite 1.2     #rectovaginal fistula  - per GYN onc, hx of recto vaginal fistula that resolved spontaneously, however now appears to have recurred  - f/u CT abd and pelvis w oral and IV contrast - confirmed entero vaginal fistula on CT pelvis  - appreciate gyn recs Hx of urinary retention/overflow incontinence   - Joseph changed on 5/17   - ct abdomen shows mild L hydro w obstructing stone on 6/6  - per urology, continue w joseph    #PEG placement  - Pt s/p PEG placement 5/21   - holding Osmolite 1.2     #rectovaginal fistula  - per GYN onc, hx of recto vaginal fistula that resolved spontaneously, however now appears to have recurred  - f/u CT abd and pelvis w oral and IV contrast - confirmed entero vaginal fistula on CT pelvis  - appreciate gyn recs  -NPO  -TPN

## 2019-06-07 NOTE — PROGRESS NOTE BEHAVIORAL HEALTH - NSBHFUPINTERVALCCFT_PSY_A_CORE
Ms. Baker is a 59 year old Persian-American female, Persian Anglican, single, no children, currently unemployed, living with her disabled brother (who had a stroke and is wheelchair bound) and his home health aide in [place],  PPH of depression since 2018, no history of psych hospitalizations, PMH of polio, breast cancer, DM, endometrial cancer s/p exploratory laparotomy, enterolysis, SHAMIR, RICH, pelvic lymphadenectomy, low anterior resection mobilization of splenic flexure, end colostomy on 7/30/18, rectovaginal fistula, numerous admissions for urinary tract infection presented to [facility] in the setting of urosepsis. Hospital course complicated by blood stream infection (currently on ertapenem) and respiratory failure requiring intubation s/p extubation on 5/2. Per collateral information patient became noticeably weak mala in her hands for approx 1 week prior to being diagnosed with IDP in April, suggesting a more acute process. 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     Writer met with pt individually at bedside. Pt was oriented x 4. Pt was unable to speak but communicated with writer via writing on notepad. Pt was well-related and help-seeking. Pt presents with several symptoms of depression, including crying spells, feelings of sadness, hopelessness, anxiety, pessimism, helplessness, and passive SI without plan or intent. Pt was tearful and reported feeling angry, and hopeless in the context of ongoing medical problems. She reported that she saw a psychiatrist in 2018 and was prescribed Xanax, however she did not find it to be helpful. Pt denies symptoms of confusion/paranoia/AH/VH/dee/HI.    Possible rectovaginal fistula since last assessment

## 2019-06-07 NOTE — PROGRESS NOTE BEHAVIORAL HEALTH - SUMMARY
Ms. Baker is a 59 year old Yoruba-American female, Yoruba Muslim, single, no children, currently unemployed, living with her disabled brother (who had a stroke and is wheelchair bound) and his home health aide in [place],  PPH of depression since 2018, no history of psych hospitalizations, PMH of polio, breast cancer, DM, endometrial cancer s/p exploratory laparotomy, enterolysis, SHAMIR, RICH, pelvic lymphadenectomy, low anterior resection mobilization of splenic flexure, end colostomy on 7/30/18, rectovaginal fistula, numerous admissions for urinary tract infection presented to [facility] in the setting of urosepsis. Hospital course complicated by blood stream infection (currently on ertapenem) and respiratory failure requiring intubation s/p extubation on 5/2. Per collateral information patient became noticeably weak mala in her hands for approx 1 week prior to being diagnosed with IDP in April, suggesting a more acute process. 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     Writer met with pt individually at bedside. Pt was oriented x 4. Pt was unable to speak but communicated with writer via writing on notepad. Pt was well-related and help-seeking. Pt presents with several symptoms of depression, including crying spells, feelings of sadness, hopelessness, anxiety, pessimism, helplessness, and passive SI without plan or intent. Pt was tearful and reported feeling angry, and hopeless in the context of ongoing medical problems. She reported that she saw a psychiatrist in 2018 and was prescribed Xanax, however she did not find it to be helpful. Pt denies symptoms of confusion/paranoia/AH/VH/dee/HI.    1)Pt vehemently against psych meds in spite of my thought that remeron 15 mg soltab might help her. She agreed to take it qhs prn for insomnia,    2)C/L will follow and provide supportive therapy.
Ms. Baker is a 59 year old Amharic-American female, Amharic Taoist, single, no children, currently unemployed, living with her disabled brother (who had a stroke and is wheelchair bound) and his home health aide in [place],  PPH of depression since 2018, no history of psych hospitalizations, PMH of polio, breast cancer, DM, endometrial cancer s/p exploratory laparotomy, enterolysis, SHAMIR, RICH, pelvic lymphadenectomy, low anterior resection mobilization of splenic flexure, end colostomy on 7/30/18, rectovaginal fistula, numerous admissions for urinary tract infection presented to [facility] in the setting of urosepsis. Hospital course complicated by blood stream infection (currently on ertapenem) and respiratory failure requiring intubation s/p extubation on 5/2. Per collateral information patient became noticeably weak mala in her hands for approx 1 week prior to being diagnosed with IDP in April, suggesting a more acute process. 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     Writer met with pt individually at bedside. Pt was oriented x 4. Pt was unable to speak but communicated with writer via writing on notepad. Pt was well-related and help-seeking. Pt presents with several symptoms of depression, including crying spells, feelings of sadness, hopelessness, anxiety, pessimism, helplessness, and passive SI without plan or intent. Pt was tearful and reported feeling angry, and hopeless in the context of ongoing medical problems. She reported that she saw a psychiatrist in 2018 and was prescribed Xanax, however she did not find it to be helpful. Pt denies symptoms of confusion/paranoia/AH/VH/dee/HI.    1)Pt vehemently against psych meds in spite of my thought that remeron 15 mg soltab might help her. She agreed to take it qhs prn for insomnia,    2)C/L will follow and provide supportive therapy.

## 2019-06-07 NOTE — PROGRESS NOTE ADULT - SUBJECTIVE AND OBJECTIVE BOX
NEUROLOGY CONSULT PROGRESS NOTE    INTERVAL HISTORY:  Patient received PLEX x2. Currently complaining of increased secretions and frequent need for suctioning.     REVIEW OF SYSTEMS:  As per HPI, otherwise negative for Constitutional, Eyes, Ears/Nose/Mouth/Throat, Neck, Cardiovascular, Respiratory, Gastrointestinal, Genitourinary, Skin, Endocrine, Musculoskeletal, Psychiatric, and Hematologic/Lymphatic.    MEDICATIONS:  acetaminophen    Suspension .. 1000 milliGRAM(s) Oral every 8 hours PRN  albumin human  5% IVPB 2750 milliLiter(s) IV Intermittent once  albumin human  5% IVPB 2750 milliLiter(s) IV Intermittent once  ALBUTerol/ipratropium for Nebulization 3 milliLiter(s) Nebulizer every 6 hours  calcium gluconate IVPB 1 Gram(s) IV Intermittent once  calcium gluconate IVPB 1 Gram(s) IV Intermittent once  chlorhexidine 0.12% Liquid 15 milliLiter(s) Oral Mucosa every 12 hours  chlorhexidine 2% Cloths 1 Application(s) Topical <User Schedule>  dextrose 40% Gel 15 Gram(s) Oral once PRN  dextrose 5% + lactated ringers. 1000 milliLiter(s) IV Continuous <Continuous>  dextrose 5%. 1000 milliLiter(s) IV Continuous <Continuous>  dextrose 50% Injectable 25 Gram(s) IV Push once  enoxaparin Injectable 40 milliGRAM(s) SubCutaneous every 24 hours  fludroCORTISONE 0.1 milliGRAM(s) Oral every 24 hours  glucagon  Injectable 1 milliGRAM(s) IntraMuscular once PRN  heparin 1000 units/mL 2000 Unit(s) 2000 Unit(s) IV Push once  heparin 1000units/mL 4000 Unit(s) 4000 Unit(s) IV Push once  insulin regular  human corrective regimen sliding scale   SubCutaneous every 6 hours  lidocaine 2% Gel 1 Application(s) Topical two times a day PRN  midodrine 10 milliGRAM(s) Oral every 8 hours  morphine  - Injectable 1 milliGRAM(s) IV Push once  morphine  - Injectable 2 milliGRAM(s) IV Push once  nystatin Cream 1 Application(s) Topical two times a day  Parenteral Nutrition - Adult 1 Each TPN Continuous <Continuous>  piperacillin/tazobactam IVPB. 4.5 Gram(s) IV Intermittent every 6 hours  sodium chloride 0.9% lock flush 10 milliLiter(s) IV Push every 1 hour PRN  sodium chloride 3%  Inhalation 3 milliLiter(s) Inhalation every 4 hours  vancomycin  IVPB 500 milliGRAM(s) IV Intermittent every 12 hours  zinc oxide 20% Ointment 1 Application(s) Topical three times a day    VITAL SIGNS:  Vital Signs Last 24 Hrs  T(C): 36.9 (07 Jun 2019 06:52), Max: 37.4 (06 Jun 2019 21:01)  T(F): 98.5 (07 Jun 2019 06:52), Max: 99.4 (06 Jun 2019 21:01)  HR: 67 (07 Jun 2019 06:52) (61 - 76)  BP: 117/66 (07 Jun 2019 06:52) (112/70 - 140/84)  BP(mean): --  RR: 15 (07 Jun 2019 06:52) (12 - 19)  SpO2: 100% (07 Jun 2019 06:52) (98% - 100%)    PHYSICAL EXAMINATION:  Constitutional: WDWN; NAD  Eyes: anicteric sclera  Cardiovascular: +S1/S2, RRR; no M/R/G  Neurologic:  - Mental Status:  AAOx3; speech appears fluent with intact naming, repetition, and comprehension  - Cranial Nerves II-XII:    II:  PERRLA; visual fields are full to confrontation  III, IV, VI:  EOMI, no nystagmus  V:  facial sensation is intact in the V1-V3 distribution bilaterally.  VII:  face is symmetric with normal eye closure and smile  IX, X:  uvula is midline and soft palate rises symmetrically  XI:  head turning and shoulder shrug are intact bilaterally  XII:  tongue protrudes in the midline  - Motor:  strength is 4+/5 in biceps and deltoids, 4/5 in triceps, 1-2/5 in hip flexion/extension, 1/5 in foot flexion extension  - Reflexes:  areflexia   - Sensory:  intact to light touch, pin prick, vibration, and joint-position sense throughout  - Coordination:  finger-nose-finger and heel-knee-shin intact without dysmetria; rapid alternating hand movements intact      LABS:                          7.3    12.90 )-----------( 204      ( 07 Jun 2019 06:09 )             23.8     06-07    138  |  104  |  10  ----------------------------<  169<H>  3.5   |  25  |  0.54    Ca    8.5      07 Jun 2019 06:09  Phos  3.2     06-07  Mg     1.6     06-07    TPro  5.2<L>  /  Alb  3.7  /  TBili  0.6  /  DBili  x   /  AST  10  /  ALT  <5<L>  /  AlkPhos  27<L>  06-07    PT/INR - ( 05 Jun 2019 22:55 )   PT: 13.4 sec;   INR: 1.18          PTT - ( 06 Jun 2019 06:18 )  PTT:28.1 sec      RADIOLOGY & ADDITIONAL STUDIES:      IMPRESSION & RECOMMENDATIONS: NEUROLOGY CONSULT PROGRESS NOTE    INTERVAL HISTORY:  Patient received PLEX x2. Currently complaining of increased secretions and frequent need for suctioning.     REVIEW OF SYSTEMS:  As per HPI, otherwise negative for Constitutional, Eyes, Ears/Nose/Mouth/Throat, Neck, Cardiovascular, Respiratory, Gastrointestinal, Genitourinary, Skin, Endocrine, Musculoskeletal, Psychiatric, and Hematologic/Lymphatic.    MEDICATIONS:  acetaminophen    Suspension .. 1000 milliGRAM(s) Oral every 8 hours PRN  albumin human  5% IVPB 2750 milliLiter(s) IV Intermittent once  albumin human  5% IVPB 2750 milliLiter(s) IV Intermittent once  ALBUTerol/ipratropium for Nebulization 3 milliLiter(s) Nebulizer every 6 hours  calcium gluconate IVPB 1 Gram(s) IV Intermittent once  calcium gluconate IVPB 1 Gram(s) IV Intermittent once  chlorhexidine 0.12% Liquid 15 milliLiter(s) Oral Mucosa every 12 hours  chlorhexidine 2% Cloths 1 Application(s) Topical <User Schedule>  dextrose 40% Gel 15 Gram(s) Oral once PRN  dextrose 5% + lactated ringers. 1000 milliLiter(s) IV Continuous <Continuous>  dextrose 5%. 1000 milliLiter(s) IV Continuous <Continuous>  dextrose 50% Injectable 25 Gram(s) IV Push once  enoxaparin Injectable 40 milliGRAM(s) SubCutaneous every 24 hours  fludroCORTISONE 0.1 milliGRAM(s) Oral every 24 hours  glucagon  Injectable 1 milliGRAM(s) IntraMuscular once PRN  heparin 1000 units/mL 2000 Unit(s) 2000 Unit(s) IV Push once  heparin 1000units/mL 4000 Unit(s) 4000 Unit(s) IV Push once  insulin regular  human corrective regimen sliding scale   SubCutaneous every 6 hours  lidocaine 2% Gel 1 Application(s) Topical two times a day PRN  midodrine 10 milliGRAM(s) Oral every 8 hours  morphine  - Injectable 1 milliGRAM(s) IV Push once  morphine  - Injectable 2 milliGRAM(s) IV Push once  nystatin Cream 1 Application(s) Topical two times a day  Parenteral Nutrition - Adult 1 Each TPN Continuous <Continuous>  piperacillin/tazobactam IVPB. 4.5 Gram(s) IV Intermittent every 6 hours  sodium chloride 0.9% lock flush 10 milliLiter(s) IV Push every 1 hour PRN  sodium chloride 3%  Inhalation 3 milliLiter(s) Inhalation every 4 hours  vancomycin  IVPB 500 milliGRAM(s) IV Intermittent every 12 hours  zinc oxide 20% Ointment 1 Application(s) Topical three times a day    VITAL SIGNS:  Vital Signs Last 24 Hrs  T(C): 36.9 (07 Jun 2019 06:52), Max: 37.4 (06 Jun 2019 21:01)  T(F): 98.5 (07 Jun 2019 06:52), Max: 99.4 (06 Jun 2019 21:01)  HR: 67 (07 Jun 2019 06:52) (61 - 76)  BP: 117/66 (07 Jun 2019 06:52) (112/70 - 140/84)  BP(mean): --  RR: 15 (07 Jun 2019 06:52) (12 - 19)  SpO2: 100% (07 Jun 2019 06:52) (98% - 100%)    PHYSICAL EXAMINATION:  Constitutional: WDWN; NAD  Eyes: anicteric sclera  Cardiovascular: +S1/S2, RRR; no M/R/G  Neurologic:  - Mental Status:  AAOx3; speech appears fluent with intact naming, repetition, and comprehension  - Cranial Nerves II-XII:    II:  PERRLA; visual fields are full to confrontation  III, IV, VI:  EOMI, no nystagmus  V:  facial sensation is intact in the V1-V3 distribution bilaterally.  VII:  face is symmetric with normal eye closure and smile  IX, X:  uvula is midline and soft palate rises symmetrically  XI:  head turning and shoulder shrug are intact bilaterally  XII:  tongue protrudes in the midline  - Motor:  strength is 4+/5 in biceps and deltoids, 4/5 in triceps, 2-3/5 in hip flexion/extension, 1/5 in foot flexion extension  - Reflexes:  areflexia   - Sensory:  intact to light touch, pin prick, vibration, and joint-position sense throughout  - Coordination:  finger-nose-finger and heel-knee-shin intact without dysmetria; rapid alternating hand movements intact      LABS:                          7.3    12.90 )-----------( 204      ( 07 Jun 2019 06:09 )             23.8     06-07    138  |  104  |  10  ----------------------------<  169<H>  3.5   |  25  |  0.54    Ca    8.5      07 Jun 2019 06:09  Phos  3.2     06-07  Mg     1.6     06-07    TPro  5.2<L>  /  Alb  3.7  /  TBili  0.6  /  DBili  x   /  AST  10  /  ALT  <5<L>  /  AlkPhos  27<L>  06-07    PT/INR - ( 05 Jun 2019 22:55 )   PT: 13.4 sec;   INR: 1.18          PTT - ( 06 Jun 2019 06:18 )  PTT:28.1 sec      RADIOLOGY & ADDITIONAL STUDIES:      IMPRESSION & RECOMMENDATIONS:

## 2019-06-07 NOTE — PROGRESS NOTE ADULT - ASSESSMENT
58 yo F PMHx polio, breast cancer, DM, Endometrial Cancer s/p exploratory laparotomy, enterolysis, SHAMIR, BSO, pelvic lymphadenectomy, low anterior resection mobilization of splenic flexure, end colostomy on 7/30/18, rectovaginal fistula, numerous admissions for urinary tract infection presented to [facility] in the setting of urosepsis. Hospital course complicated by ESBL E coli bacteremia (completed ertapenem on 5/14 ) and respiratory failure requiring multiple intubations 2/2 AIDP, now s/p trach and PEG (5/21), s/p treatment for sepsis 2/2 MRSA PNA w/ linezolid. Now on vanco/zosyn for tracheitis.  Now receiving 2nd round of plasmapheresis. Now with re-opening of enteric-vaginal fistula, on strict NPO requiring TPN. Now stable for LTAC pending bed acceptance.

## 2019-06-07 NOTE — PROGRESS NOTE BEHAVIORAL HEALTH - NSBHFUPINTERVALHXFT_PSY_A_CORE
Pt looking brighter. Shared her fears that she will never walk again. Family has been visiting and patient has [employer] pictures on wall. Also has a stuffed toy lamb in bed. Has had trouble sleeping due to excess secretions in throat but has not had to use remeron prn. Denies SI. Does not want to engage in therapy at this time or during the rest of the hospitalization. Reconsult if necessary.

## 2019-06-07 NOTE — PROGRESS NOTE ADULT - ASSESSMENT
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 Clinical worsening earlier in week likely 2/2 to anemia more than relapse of disease as improvement was seen prior to PLEX. Patient will need continued PLEX at least for 3 months with reassessment after.    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 Clinical worsening earlier in week likely 2/2 to anemia more than relapse of disease as improvement was seen prior to PLEX. Patient will need continued PLEX at least for 3 months with reassessment after.    -Plasma exchange 1 session in 1 month, to be re-assessed by Dr. Sellers who will manage outpatient care.  -HD cath greatly increases risk of infection for patient, please ensure proper central line care instructions for LTAC  -Mobilize to chair and CPAP trials as tolerated  -PT as tolerated

## 2019-06-07 NOTE — PROGRESS NOTE ADULT - SUBJECTIVE AND OBJECTIVE BOX
Incidental left distal ureteral stone discovered on CT pelvis. Creat normal, no fevers. No urgent need for surgical intervention. Can consider urologic intervention when medically/surgically stable. If fevers would consider urgent nephrostomy tube vs ureteral stent. Discussed with Dr. Nix.

## 2019-06-07 NOTE — PROGRESS NOTE ADULT - SUBJECTIVE AND OBJECTIVE BOX
Interval Events:  Patient seen and examined at bedside.    Patient feeling sensation of "choking" this AM. Peak pressures on Vent mid-30s. She was suctioned with minimal secretions obtained. She was repositioned in bed and pressures improved to mid 20s. Continues to have occasional cough,. Denies SOB.     MEDICATIONS:  Pulmonary:  ALBUTerol/ipratropium for Nebulization 3 milliLiter(s) Nebulizer every 6 hours  sodium chloride 3%  Inhalation 3 milliLiter(s) Inhalation every 4 hours    Antimicrobials:  piperacillin/tazobactam IVPB. 4.5 Gram(s) IV Intermittent every 6 hours  vancomycin  IVPB 500 milliGRAM(s) IV Intermittent every 12 hours    Anticoagulants:  enoxaparin Injectable 40 milliGRAM(s) SubCutaneous every 24 hours    Cardiac:  midodrine 10 milliGRAM(s) Oral every 8 hours    Endocrine:  dextrose 40% Gel 15 Gram(s) Oral once PRN  dextrose 50% Injectable 25 Gram(s) IV Push once  fludroCORTISONE 0.1 milliGRAM(s) Oral every 24 hours  glucagon  Injectable 1 milliGRAM(s) IntraMuscular once PRN  insulin regular  human corrective regimen sliding scale   SubCutaneous every 6 hours    Allergies    No Known Allergies    Intolerances    IVIG PRODUCT IS PRIGIVEN OR GAMMUNEX (Unknown)    Vital Signs Last 24 Hrs  T(C): 36.8 (07 Jun 2019 08:46), Max: 37.4 (06 Jun 2019 21:01)  T(F): 98.2 (07 Jun 2019 08:46), Max: 99.4 (06 Jun 2019 21:01)  HR: 71 (07 Jun 2019 09:02) (61 - 76)  BP: 123/87 (07 Jun 2019 08:46) (112/70 - 140/84)  BP(mean): --  RR: 16 (07 Jun 2019 09:02) (12 - 17)  SpO2: 99% (07 Jun 2019 09:02) (98% - 100%)    06-06 @ 07:01  -  06-07 @ 07:00  --------------------------------------------------------  IN: 0 mL / OUT: 1500 mL / NET: -1500 mL      Mode: AC/ CMV (Assist Control/ Continuous Mandatory Ventilation)  RR (machine): 12  TV (machine): 320  FiO2: 40  PEEP: 5  ITime: 1  MAP: 9.9  PIP: 23      LABS:    CBC Full  -  ( 07 Jun 2019 06:09 )  WBC Count : 12.90 K/uL  RBC Count : 2.52 M/uL  Hemoglobin : 7.3 g/dL  Hematocrit : 23.8 %  Platelet Count - Automated : 204 K/uL  Mean Cell Volume : 94.4 fl  Mean Cell Hemoglobin : 29.0 pg  Mean Cell Hemoglobin Concentration : 30.7 gm/dL    06-07    138  |  104  |  10  ----------------------------<  169<H>  3.5   |  25  |  0.54    Ca    8.5      07 Jun 2019 06:09  Phos  3.2     06-07  Mg     1.6     06-07    TPro  5.2<L>  /  Alb  3.7  /  TBili  0.6  /  DBili  x   /  AST  10  /  ALT  <5<L>  /  AlkPhos  27<L>  06-07    PT/INR - ( 05 Jun 2019 22:55 )   PT: 13.4 sec;   INR: 1.18       PTT - ( 06 Jun 2019 06:18 )  PTT:28.1 sec    RADIOLOGY & ADDITIONAL STUDIES (The following images were personally reviewed):  Reviewed.

## 2019-06-07 NOTE — PROGRESS NOTE ADULT - ASSESSMENT
60yo F HD40 with stage IV endometrial AdenoCA s/p staging surgery '18 and 1 round of chemotherapy 2/19 readmitted from  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  Hospital course notable for prolonged intubation and MICU admission, transitioned to tracheostomy and PEG tube on 5/21, now weened off pressors and transferred to chronic vent unit on 4Uris.   [facility] however unable to transfer due to lack of IV IG, thus in house for neurology recommendations for treatment.   Patient noted to have  patient has history of enterovaginal fistula which was previously treated conservatively and appeared to have resolved per imaging and resolution of leaking per vagina. Now patient found to have a recurrence of fistula, confirmed on CT scan.  -Fistula: manage conservatively w/ bowel rest. Continue TPN and holding tube feeds.   -: Neurogenic bladder w/ Indwelling joseph in place  When stable, consider urology consult for long term management i.e. for placement suprapubic catheter  - GYN malignancy  - s/p Anastrazole, s/p Megace 80mg BID x3 wks , s/p Tamoxifen 5/1-5/17 (stopped due to upper extremity DVT). Once patient recovers from acute neurologic issue will reassess cancer treatment options   -  Will avoid immunotherapy at this time given associated risk of autoimmune disease   - pathology serous on review  - Social work/palliative:  DNR/DNI. Interdisciplinary meeting was done on 5/23

## 2019-06-07 NOTE — PROGRESS NOTE BEHAVIORAL HEALTH - NSBHADMITCOUNSEL_PSY_A_CORE
instructions for management, treatment and follow up/risk factor reduction/risks and benefits of treatment options/importance of adherence to chosen treatment/diagnostic results/impressions and/or recommended studies/client/family/caregiver education/prognosis
instructions for management, treatment and follow up/risk factor reduction/risks and benefits of treatment options/importance of adherence to chosen treatment/client/family/caregiver education/diagnostic results/impressions and/or recommended studies/prognosis

## 2019-06-07 NOTE — PROGRESS NOTE BEHAVIORAL HEALTH - NSBHCONSULTRECOMMENDOTHER_PSY_A_CORE FT
1)remeron 15 mg soltab prn qhs might help her with sleep and depression.     2)C/L signing off. reconsult if necessary
1)Pt vehemently against psych meds in spite of my thought that remeron 15 mg soltab might help her.     2)C/L will follow and provide supportive therapy.

## 2019-06-07 NOTE — PROGRESS NOTE ADULT - SUBJECTIVE AND OBJECTIVE BOX
INTERVAL HPI/OVERNIGHT EVENTS:  Patient was seen and examined at bedside. As per nurse and patient, no o/n events. Now on pressure support. Pt tearful, c/o choking sensation. Patient denies: fever, chills, dizziness, weakness, HA, Changes in vision, CP, palpitations, SOB, cough, N/V/D/C, dysuria, changes in bowel movements, LE edema. ROS otherwise negative.    VITAL SIGNS:  T(F): 98.2 (06-07-19 @ 08:46)  HR: 65 (06-07-19 @ 12:05)  BP: 123/87 (06-07-19 @ 08:46)  RR: 16 (06-07-19 @ 12:05)  SpO2: 99% (06-07-19 @ 12:05)  Wt(kg): --    PHYSICAL EXAM:    PHYSICAL EXAM:  Constitutional: NAD, comfortable in bed.  HEENT: NC/AT, PERRLA, EOMI, no conjunctival pallor or scleral icterus, MMM  Neck: Supple, no JVD, tracheostomy in place w serosang secretions  Respiratory: on cpap, lungs w diffuse coarse rhonchi  Cardiovascular: RRR, normal S1 and S2, no m/r/g.   Gastrointestinal: +BS, soft NTND, no guarding or rebound tenderness, no palpable masses, PEG in place - no signs of infection, colostomy in place w brown stool  : joseph in place  Extremities: wwp; no cyanosis, clubbing or edema.   Vascular: Pulses equal and strong throughout, R PICC line, R chest chemoport  Neurological: AAOx3, no CN deficits, strength 5/5 in UEs and 1/5 in LEs, sensation intact throughout.       MEDICATIONS  (STANDING):  albumin human  5% IVPB 2750 milliLiter(s) IV Intermittent once  albumin human  5% IVPB 2750 milliLiter(s) IV Intermittent once  ALBUTerol/ipratropium for Nebulization 3 milliLiter(s) Nebulizer every 6 hours  calcium gluconate IVPB 1 Gram(s) IV Intermittent once  calcium gluconate IVPB 1 Gram(s) IV Intermittent once  chlorhexidine 0.12% Liquid 15 milliLiter(s) Oral Mucosa every 12 hours  chlorhexidine 2% Cloths 1 Application(s) Topical <User Schedule>  dextrose 5%. 1000 milliLiter(s) (50 mL/Hr) IV Continuous <Continuous>  dextrose 50% Injectable 25 Gram(s) IV Push once  enoxaparin Injectable 40 milliGRAM(s) SubCutaneous every 24 hours  fat emulsion (Plant Based) 20% Infusion 0.938 Gm/kG/Day (20.832 mL/Hr) IV Continuous <Continuous>  fludroCORTISONE 0.1 milliGRAM(s) Oral every 24 hours  heparin 1000 units/mL 2000 Unit(s) 2000 Unit(s) IV Push once  heparin 1000units/mL 4000 Unit(s) 4000 Unit(s) IV Push once  insulin regular  human corrective regimen sliding scale   SubCutaneous every 6 hours  midodrine 10 milliGRAM(s) Oral every 8 hours  morphine  - Injectable 1 milliGRAM(s) IV Push once  morphine  - Injectable 2 milliGRAM(s) IV Push once  nystatin Cream 1 Application(s) Topical two times a day  Parenteral Nutrition - Adult 1 Each (63 mL/Hr) TPN Continuous <Continuous>  Parenteral Nutrition - Adult 1 Each (63 mL/Hr) TPN Continuous <Continuous>  piperacillin/tazobactam IVPB. 4.5 Gram(s) IV Intermittent every 6 hours  sodium chloride 3%  Inhalation 3 milliLiter(s) Inhalation every 4 hours  vancomycin  IVPB 500 milliGRAM(s) IV Intermittent every 12 hours  zinc oxide 20% Ointment 1 Application(s) Topical three times a day    MEDICATIONS  (PRN):  acetaminophen    Suspension .. 1000 milliGRAM(s) Oral every 8 hours PRN Moderate Pain (4 - 6)  dextrose 40% Gel 15 Gram(s) Oral once PRN Blood Glucose LESS THAN 70 milliGRAM(s)/deciliter  glucagon  Injectable 1 milliGRAM(s) IntraMuscular once PRN Glucose LESS THAN 70 milligrams/deciliter  lidocaine 2% Gel 1 Application(s) Topical two times a day PRN pain around PEG site  sodium chloride 0.9% lock flush 10 milliLiter(s) IV Push every 1 hour PRN Pre/post blood products, medications, blood draw, and to maintain line patency      Allergies    No Known Allergies    Intolerances    IVIG PRODUCT IS PRIGIVEN OR GAMMUNEX (Unknown)      LABS:                        7.3    12.90 )-----------( 204      ( 07 Jun 2019 06:09 )             23.8     06-07    138  |  104  |  10  ----------------------------<  169<H>  3.5   |  25  |  0.54    Ca    8.5      07 Jun 2019 06:09  Phos  3.2     06-07  Mg     1.6     06-07    TPro  5.2<L>  /  Alb  3.7  /  TBili  0.6  /  DBili  x   /  AST  10  /  ALT  <5<L>  /  AlkPhos  27<L>  06-07    PT/INR - ( 05 Jun 2019 22:55 )   PT: 13.4 sec;   INR: 1.18          PTT - ( 06 Jun 2019 06:18 )  PTT:28.1 sec      RADIOLOGY & ADDITIONAL TESTS:  Reviewed

## 2019-06-07 NOTE — PROGRESS NOTE BEHAVIORAL HEALTH - AXIS III
polio, breast cancer, DM, endometrial cancer s/p exploratory laparotomy, enterolysis, SHAMIR, RICH, pelvic lymphadenectomy, low anterior resection mobilization of splenic flexure, end colostomy on 7/30/18, rectovaginal fistula, numerous admissions for urinary tract infection presented to [facility] in the setting of urosepsis.
polio, breast cancer, DM, endometrial cancer s/p exploratory laparotomy, enterolysis, SHAMIR, RICH, pelvic lymphadenectomy, low anterior resection mobilization of splenic flexure, end colostomy on 7/30/18, rectovaginal fistula, numerous admissions for urinary tract infection presented to [facility] in the setting of urosepsis.

## 2019-06-07 NOTE — PROGRESS NOTE ADULT - PROBLEM SELECTOR PLAN 1
Patient currently Vent dependent due to AIDP. She has been improving and was tolerating CPAP for longer periods of time.     Vent log were reviewed after first episode and Peak pressures were elevated due to suspected mucous plug.   She was started on bronchodilators but had another episode today. Secretions suctioned today by Pulm team where thick and purulent appearing. Would treat for tracheitis but obtain good sputum sample prior to treatment.     Another possibility is intermittent obstruction by posterior membrane however this is less likely.     Chest Ct shows areas of atelectasis without areas suspicious for pneumonia. Seen on CT is a collapsable Trachea at the distal end of the Tracheostomy tube.       Recommendations:  - Continue Bronchodilators with Duonebs Q6 standing  - continue suctioning Q4.    - sputum culture obtain, please ensure delivery to alb.   - Complete course of treatment for presumed tracheitis with 7 days of Abx.  Vancomycin and zosyn while inpatient. Linezolid may be good options for transitioning to oral meds on discharge, however if LTAC capable of adminstering Vanc/Zosyn, would continue.

## 2019-06-08 LAB
ALBUMIN SERPL ELPH-MCNC: 3.7 G/DL — SIGNIFICANT CHANGE UP (ref 3.3–5)
ALP SERPL-CCNC: 37 U/L — LOW (ref 40–120)
ALT FLD-CCNC: <5 U/L — LOW (ref 10–45)
ANION GAP SERPL CALC-SCNC: 11 MMOL/L — SIGNIFICANT CHANGE UP (ref 5–17)
AST SERPL-CCNC: 9 U/L — LOW (ref 10–40)
BILIRUB SERPL-MCNC: 0.3 MG/DL — SIGNIFICANT CHANGE UP (ref 0.2–1.2)
BUN SERPL-MCNC: 16 MG/DL — SIGNIFICANT CHANGE UP (ref 7–23)
CALCIUM SERPL-MCNC: 9.3 MG/DL — SIGNIFICANT CHANGE UP (ref 8.4–10.5)
CHLORIDE SERPL-SCNC: 106 MMOL/L — SIGNIFICANT CHANGE UP (ref 96–108)
CO2 SERPL-SCNC: 24 MMOL/L — SIGNIFICANT CHANGE UP (ref 22–31)
CREAT SERPL-MCNC: 0.49 MG/DL — LOW (ref 0.5–1.3)
CULTURE RESULTS: SIGNIFICANT CHANGE UP
GLUCOSE SERPL-MCNC: 149 MG/DL — HIGH (ref 70–99)
HCT VFR BLD CALC: 25.2 % — LOW (ref 34.5–45)
HGB BLD-MCNC: 7.5 G/DL — LOW (ref 11.5–15.5)
MAGNESIUM SERPL-MCNC: 1.5 MG/DL — LOW (ref 1.6–2.6)
MCHC RBC-ENTMCNC: 28.7 PG — SIGNIFICANT CHANGE UP (ref 27–34)
MCHC RBC-ENTMCNC: 29.8 GM/DL — LOW (ref 32–36)
MCV RBC AUTO: 96.6 FL — SIGNIFICANT CHANGE UP (ref 80–100)
NRBC # BLD: 0 /100 WBCS — SIGNIFICANT CHANGE UP (ref 0–0)
PLATELET # BLD AUTO: 207 K/UL — SIGNIFICANT CHANGE UP (ref 150–400)
POTASSIUM SERPL-MCNC: 3.8 MMOL/L — SIGNIFICANT CHANGE UP (ref 3.5–5.3)
POTASSIUM SERPL-SCNC: 3.8 MMOL/L — SIGNIFICANT CHANGE UP (ref 3.5–5.3)
PROT SERPL-MCNC: 5.7 G/DL — LOW (ref 6–8.3)
RBC # BLD: 2.61 M/UL — LOW (ref 3.8–5.2)
RBC # FLD: 15.7 % — HIGH (ref 10.3–14.5)
SODIUM SERPL-SCNC: 141 MMOL/L — SIGNIFICANT CHANGE UP (ref 135–145)
SPECIMEN SOURCE: SIGNIFICANT CHANGE UP
WBC # BLD: 8.2 K/UL — SIGNIFICANT CHANGE UP (ref 3.8–10.5)
WBC # FLD AUTO: 8.2 K/UL — SIGNIFICANT CHANGE UP (ref 3.8–10.5)

## 2019-06-08 PROCEDURE — 99233 SBSQ HOSP IP/OBS HIGH 50: CPT | Mod: GC

## 2019-06-08 RX ORDER — MAGNESIUM SULFATE 500 MG/ML
4 VIAL (ML) INJECTION ONCE
Refills: 0 | Status: COMPLETED | OUTPATIENT
Start: 2019-06-08 | End: 2019-06-08

## 2019-06-08 RX ORDER — ELECTROLYTE SOLUTION,INJ
1 VIAL (ML) INTRAVENOUS
Refills: 0 | Status: DISCONTINUED | OUTPATIENT
Start: 2019-06-08 | End: 2019-06-08

## 2019-06-08 RX ORDER — I.V. FAT EMULSION 20 G/100ML
0.94 EMULSION INTRAVENOUS
Qty: 50 | Refills: 0 | Status: DISCONTINUED | OUTPATIENT
Start: 2019-06-08 | End: 2019-06-08

## 2019-06-08 RX ORDER — DIPHENHYDRAMINE HCL 50 MG
25 CAPSULE ORAL ONCE
Refills: 0 | Status: DISCONTINUED | OUTPATIENT
Start: 2019-06-08 | End: 2019-06-08

## 2019-06-08 RX ORDER — ACETAMINOPHEN 500 MG
650 TABLET ORAL ONCE
Refills: 0 | Status: COMPLETED | OUTPATIENT
Start: 2019-06-08 | End: 2019-06-08

## 2019-06-08 RX ORDER — POTASSIUM CHLORIDE 20 MEQ
20 PACKET (EA) ORAL ONCE
Refills: 0 | Status: COMPLETED | OUTPATIENT
Start: 2019-06-08 | End: 2019-06-08

## 2019-06-08 RX ADMIN — ZINC OXIDE 1 APPLICATION(S): 200 OINTMENT TOPICAL at 02:36

## 2019-06-08 RX ADMIN — PIPERACILLIN AND TAZOBACTAM 200 GRAM(S): 4; .5 INJECTION, POWDER, LYOPHILIZED, FOR SOLUTION INTRAVENOUS at 00:08

## 2019-06-08 RX ADMIN — FLUDROCORTISONE ACETATE 0.1 MILLIGRAM(S): 0.1 TABLET ORAL at 06:43

## 2019-06-08 RX ADMIN — SODIUM CHLORIDE 3 MILLILITER(S): 9 INJECTION INTRAMUSCULAR; INTRAVENOUS; SUBCUTANEOUS at 02:36

## 2019-06-08 RX ADMIN — SODIUM CHLORIDE 3 MILLILITER(S): 9 INJECTION INTRAMUSCULAR; INTRAVENOUS; SUBCUTANEOUS at 18:19

## 2019-06-08 RX ADMIN — Medication 1000 MILLIGRAM(S): at 11:20

## 2019-06-08 RX ADMIN — Medication 3 MILLILITER(S): at 00:08

## 2019-06-08 RX ADMIN — ENOXAPARIN SODIUM 40 MILLIGRAM(S): 100 INJECTION SUBCUTANEOUS at 18:32

## 2019-06-08 RX ADMIN — PIPERACILLIN AND TAZOBACTAM 200 GRAM(S): 4; .5 INJECTION, POWDER, LYOPHILIZED, FOR SOLUTION INTRAVENOUS at 06:42

## 2019-06-08 RX ADMIN — INSULIN HUMAN 2: 100 INJECTION, SOLUTION SUBCUTANEOUS at 06:43

## 2019-06-08 RX ADMIN — Medication 3 MILLILITER(S): at 06:43

## 2019-06-08 RX ADMIN — INSULIN HUMAN 2: 100 INJECTION, SOLUTION SUBCUTANEOUS at 12:42

## 2019-06-08 RX ADMIN — SODIUM CHLORIDE 3 MILLILITER(S): 9 INJECTION INTRAMUSCULAR; INTRAVENOUS; SUBCUTANEOUS at 15:14

## 2019-06-08 RX ADMIN — SODIUM CHLORIDE 3 MILLILITER(S): 9 INJECTION INTRAMUSCULAR; INTRAVENOUS; SUBCUTANEOUS at 22:20

## 2019-06-08 RX ADMIN — MIDODRINE HYDROCHLORIDE 10 MILLIGRAM(S): 2.5 TABLET ORAL at 02:35

## 2019-06-08 RX ADMIN — Medication 100 GRAM(S): at 10:23

## 2019-06-08 RX ADMIN — INSULIN HUMAN 2: 100 INJECTION, SOLUTION SUBCUTANEOUS at 00:08

## 2019-06-08 RX ADMIN — PIPERACILLIN AND TAZOBACTAM 200 GRAM(S): 4; .5 INJECTION, POWDER, LYOPHILIZED, FOR SOLUTION INTRAVENOUS at 11:23

## 2019-06-08 RX ADMIN — Medication 1 EACH: at 18:30

## 2019-06-08 RX ADMIN — MIDODRINE HYDROCHLORIDE 10 MILLIGRAM(S): 2.5 TABLET ORAL at 19:08

## 2019-06-08 RX ADMIN — Medication 3 MILLILITER(S): at 11:21

## 2019-06-08 RX ADMIN — CHLORHEXIDINE GLUCONATE 15 MILLILITER(S): 213 SOLUTION TOPICAL at 10:25

## 2019-06-08 RX ADMIN — I.V. FAT EMULSION 20.83 GM/KG/DAY: 20 EMULSION INTRAVENOUS at 18:30

## 2019-06-08 RX ADMIN — NYSTATIN CREAM 1 APPLICATION(S): 100000 CREAM TOPICAL at 06:42

## 2019-06-08 RX ADMIN — NYSTATIN CREAM 1 APPLICATION(S): 100000 CREAM TOPICAL at 19:08

## 2019-06-08 RX ADMIN — MIDODRINE HYDROCHLORIDE 10 MILLIGRAM(S): 2.5 TABLET ORAL at 12:43

## 2019-06-08 RX ADMIN — SODIUM CHLORIDE 3 MILLILITER(S): 9 INJECTION INTRAMUSCULAR; INTRAVENOUS; SUBCUTANEOUS at 06:43

## 2019-06-08 RX ADMIN — SODIUM CHLORIDE 3 MILLILITER(S): 9 INJECTION INTRAMUSCULAR; INTRAVENOUS; SUBCUTANEOUS at 10:25

## 2019-06-08 RX ADMIN — Medication 1000 MILLIGRAM(S): at 12:20

## 2019-06-08 RX ADMIN — PIPERACILLIN AND TAZOBACTAM 200 GRAM(S): 4; .5 INJECTION, POWDER, LYOPHILIZED, FOR SOLUTION INTRAVENOUS at 18:22

## 2019-06-08 RX ADMIN — CHLORHEXIDINE GLUCONATE 1 APPLICATION(S): 213 SOLUTION TOPICAL at 06:42

## 2019-06-08 RX ADMIN — Medication 3 MILLILITER(S): at 18:19

## 2019-06-08 RX ADMIN — CHLORHEXIDINE GLUCONATE 15 MILLILITER(S): 213 SOLUTION TOPICAL at 22:21

## 2019-06-08 RX ADMIN — Medication 100 MILLIGRAM(S): at 10:26

## 2019-06-08 RX ADMIN — Medication 650 MILLIGRAM(S): at 23:25

## 2019-06-08 RX ADMIN — Medication 20 MILLIEQUIVALENT(S): at 10:24

## 2019-06-08 NOTE — PROGRESS NOTE ADULT - SUBJECTIVE AND OBJECTIVE BOX
OVERNIGHT: No acute events.    INTERVAL HPI: Patient was seen and examined at bedside. No new complaints. Would like blankets rearranged. Patient denies: fever, chills, HA, SOB, abd pain.    VITAL SIGNS:  T(F): 98.2 (06-07-19 @ 08:46)  HR: 65 (06-07-19 @ 12:05)  BP: 123/87 (06-07-19 @ 08:46)  RR: 16 (06-07-19 @ 12:05)  SpO2: 99% (06-07-19 @ 12:05)  Wt(kg): --    PHYSICAL EXAM:    PHYSICAL EXAM:  Constitutional: NAD, comfortable in bed.  HEENT: NC/AT, PERRLA, EOMI, no conjunctival pallor or scleral icterus, MMM  Neck: Supple, no JVD, tracheostomy in place w serosang secretions  Respiratory: on cpap, lungs w diffuse coarse rhonchi  Cardiovascular: RRR, normal S1 and S2, no m/r/g.   Gastrointestinal: +BS, soft NTND, no guarding or rebound tenderness, no palpable masses, PEG in place - no signs of infection, colostomy in place w brown stool  : joseph in place  Extremities: wwp; no cyanosis, clubbing or edema.   Vascular: Pulses equal and strong throughout, R PICC line, R chest chemoport  Neurological: AAOx3, no CN deficits, strength 5/5 in UEs and 1/5 in LEs, sensation intact throughout.       MEDICATIONS  (STANDING):  albumin human  5% IVPB 2750 milliLiter(s) IV Intermittent once  albumin human  5% IVPB 2750 milliLiter(s) IV Intermittent once  ALBUTerol/ipratropium for Nebulization 3 milliLiter(s) Nebulizer every 6 hours  calcium gluconate IVPB 1 Gram(s) IV Intermittent once  calcium gluconate IVPB 1 Gram(s) IV Intermittent once  chlorhexidine 0.12% Liquid 15 milliLiter(s) Oral Mucosa every 12 hours  chlorhexidine 2% Cloths 1 Application(s) Topical <User Schedule>  dextrose 5%. 1000 milliLiter(s) (50 mL/Hr) IV Continuous <Continuous>  dextrose 50% Injectable 25 Gram(s) IV Push once  enoxaparin Injectable 40 milliGRAM(s) SubCutaneous every 24 hours  fat emulsion (Plant Based) 20% Infusion 0.938 Gm/kG/Day (20.832 mL/Hr) IV Continuous <Continuous>  fludroCORTISONE 0.1 milliGRAM(s) Oral every 24 hours  heparin 1000 units/mL 2000 Unit(s) 2000 Unit(s) IV Push once  heparin 1000units/mL 4000 Unit(s) 4000 Unit(s) IV Push once  insulin regular  human corrective regimen sliding scale   SubCutaneous every 6 hours  midodrine 10 milliGRAM(s) Oral every 8 hours  morphine  - Injectable 1 milliGRAM(s) IV Push once  morphine  - Injectable 2 milliGRAM(s) IV Push once  nystatin Cream 1 Application(s) Topical two times a day  Parenteral Nutrition - Adult 1 Each (63 mL/Hr) TPN Continuous <Continuous>  Parenteral Nutrition - Adult 1 Each (63 mL/Hr) TPN Continuous <Continuous>  piperacillin/tazobactam IVPB. 4.5 Gram(s) IV Intermittent every 6 hours  sodium chloride 3%  Inhalation 3 milliLiter(s) Inhalation every 4 hours  vancomycin  IVPB 500 milliGRAM(s) IV Intermittent every 12 hours  zinc oxide 20% Ointment 1 Application(s) Topical three times a day    MEDICATIONS  (PRN):  acetaminophen    Suspension .. 1000 milliGRAM(s) Oral every 8 hours PRN Moderate Pain (4 - 6)  dextrose 40% Gel 15 Gram(s) Oral once PRN Blood Glucose LESS THAN 70 milliGRAM(s)/deciliter  glucagon  Injectable 1 milliGRAM(s) IntraMuscular once PRN Glucose LESS THAN 70 milligrams/deciliter  lidocaine 2% Gel 1 Application(s) Topical two times a day PRN pain around PEG site  sodium chloride 0.9% lock flush 10 milliLiter(s) IV Push every 1 hour PRN Pre/post blood products, medications, blood draw, and to maintain line patency      Allergies    No Known Allergies    Intolerances    IVIG PRODUCT IS PRIGIVEN OR GAMMUNEX (Unknown)      LABS:                        7.3    12.90 )-----------( 204      ( 07 Jun 2019 06:09 )             23.8     06-07    138  |  104  |  10  ----------------------------<  169<H>  3.5   |  25  |  0.54    Ca    8.5      07 Jun 2019 06:09  Phos  3.2     06-07  Mg     1.6     06-07    TPro  5.2<L>  /  Alb  3.7  /  TBili  0.6  /  DBili  x   /  AST  10  /  ALT  <5<L>  /  AlkPhos  27<L>  06-07    PT/INR - ( 05 Jun 2019 22:55 )   PT: 13.4 sec;   INR: 1.18          PTT - ( 06 Jun 2019 06:18 )  PTT:28.1 sec      RADIOLOGY & ADDITIONAL TESTS:  Reviewed

## 2019-06-08 NOTE — PROGRESS NOTE ADULT - ASSESSMENT
60 yo F PMHx polio, breast cancer, DM, Endometrial Cancer s/p exploratory laparotomy, enterolysis, SHAMIR, BSO, pelvic lymphadenectomy, low anterior resection mobilization of splenic flexure, end colostomy on 7/30/18, rectovaginal fistula, numerous admissions for urinary tract infection presented to [facility] in the setting of urosepsis. Hospital course complicated by ESBL E coli bacteremia (completed ertapenem on 5/14 ) and respiratory failure requiring multiple intubations 2/2 AIDP, now s/p trach and PEG (5/21), s/p treatment for sepsis 2/2 MRSA PNA w/ linezolid. Now on vanco/zosyn for tracheitis.  Now receiving 2nd round of plasmapheresis. Now with re-opening of enteric-vaginal fistula, on strict NPO requiring TPN. Now stable for LTAC pending bed acceptance.

## 2019-06-08 NOTE — PROGRESS NOTE ADULT - PROBLEM SELECTOR PLAN 8
Hx of urinary retention/overflow incontinence   - Joseph changed on 5/17   - ct abdomen shows mild L hydro w obstructing stone on 6/6  - per urology, continue w joseph    #PEG placement  - Pt s/p PEG placement 5/21   - holding Osmolite 1.2     #rectovaginal fistula  - per GYN onc, hx of recto vaginal fistula that resolved spontaneously, however now appears to have recurred  - f/u CT abd and pelvis w oral and IV contrast - confirmed entero vaginal fistula on CT pelvis  - appreciate gyn recs  -NPO  -TPN

## 2019-06-08 NOTE — PROGRESS NOTE ADULT - ASSESSMENT
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  Hospital course notable for prolonged intubation and MICU admission, transitioned to tracheostomy and PEG tube on 5/21, now weened off pressors and transferred to chronic vent unit on 4[facility].   [facility] unable to transfer due to lack of IV IG, thus in house for neurology recommendations for treatment.   Patient noted to have  patient has history of enterovaginal fistula which was previously treated conservatively and appeared to have resolved per imaging and resolution of leaking per vagina. Now patient found to have a recurrence of fistula, confirmed on CT scan.  -Fistula: manage conservatively w/ bowel rest. Continue TPN and holding tube feeds.   -: Neurogenic bladder w/ Indwelling joseph in place  When stable, consider urology consult for long term management i.e. for placement suprapubic catheter  - GYN malignancy  - s/p Anastrazole, s/p Megace 80mg BID x3 wks , s/p Tamoxifen 5/1-5/17 (stopped due to upper extremity DVT). Once patient recovers from acute neurologic issue will reassess cancer treatment options   - ID: suspected tracheitis - continue zosyn and vancomycin  -  Will avoid immunotherapy at this time given associated risk of autoimmune disease   - pathology serous on review  - Social work/palliative:  DNR/DNI. Interdisciplinary meeting was done on 5/23

## 2019-06-08 NOTE — PROGRESS NOTE ADULT - SUBJECTIVE AND OBJECTIVE BOX
GYN Progress Note    Patient was noted to be sleeping on AM rounds and did not bother. Per nurse, no acute events overnight.     Denies CP, palpitations, SOB, fever, chills, nausea, vomiting.    Vital Signs Last 24 Hrs  T(C): 36.5 (08 Jun 2019 05:15), Max: 37.4 (07 Jun 2019 15:46)  T(F): 97.7 (08 Jun 2019 05:15), Max: 99.3 (07 Jun 2019 15:46)  HR: 62 (08 Jun 2019 08:18) (62 - 73)  BP: 146/56 (08 Jun 2019 05:15) (140/82 - 146/92)  BP(mean): --  RR: 18 (08 Jun 2019 05:15) (16 - 21)  SpO2: 100% (08 Jun 2019 08:18) (97% - 100%)    Physical Exam:  Gen: No Acute Distress  Pulm: Normal work of breathing on trach vented  Ext: SCDs in place, wnl    I&O's Summary    07 Jun 2019 07:01  -  08 Jun 2019 07:00  --------------------------------------------------------  IN: 0 mL / OUT: 2310 mL / NET: -2310 mL      MEDICATIONS  (STANDING):  albumin human  5% IVPB 2750 milliLiter(s) IV Intermittent once  albumin human  5% IVPB 2750 milliLiter(s) IV Intermittent once  ALBUTerol/ipratropium for Nebulization 3 milliLiter(s) Nebulizer every 6 hours  calcium gluconate IVPB 1 Gram(s) IV Intermittent once  calcium gluconate IVPB 1 Gram(s) IV Intermittent once  chlorhexidine 0.12% Liquid 15 milliLiter(s) Oral Mucosa every 12 hours  chlorhexidine 2% Cloths 1 Application(s) Topical <User Schedule>  dextrose 5%. 1000 milliLiter(s) (50 mL/Hr) IV Continuous <Continuous>  dextrose 50% Injectable 25 Gram(s) IV Push once  enoxaparin Injectable 40 milliGRAM(s) SubCutaneous every 24 hours  fat emulsion (Plant Based) 20% Infusion 0.938 Gm/kG/Day (20.832 mL/Hr) IV Continuous <Continuous>  fludroCORTISONE 0.1 milliGRAM(s) Oral every 24 hours  heparin 1000 units/mL 2000 Unit(s) 2000 Unit(s) IV Push once  heparin 1000units/mL 4000 Unit(s) 4000 Unit(s) IV Push once  insulin regular  human corrective regimen sliding scale   SubCutaneous every 6 hours  magnesium sulfate  IVPB 4 Gram(s) IV Intermittent once  midodrine 10 milliGRAM(s) Oral every 8 hours  morphine  - Injectable 1 milliGRAM(s) IV Push once  morphine  - Injectable 2 milliGRAM(s) IV Push once  nystatin Cream 1 Application(s) Topical two times a day  Parenteral Nutrition - Adult 1 Each (63 mL/Hr) TPN Continuous <Continuous>  piperacillin/tazobactam IVPB. 4.5 Gram(s) IV Intermittent every 6 hours  potassium chloride   Powder 20 milliEquivalent(s) Oral once  sodium chloride 3%  Inhalation 3 milliLiter(s) Inhalation every 4 hours  vancomycin  IVPB 500 milliGRAM(s) IV Intermittent every 12 hours    MEDICATIONS  (PRN):  acetaminophen    Suspension .. 1000 milliGRAM(s) Oral every 8 hours PRN Moderate Pain (4 - 6)  dextrose 40% Gel 15 Gram(s) Oral once PRN Blood Glucose LESS THAN 70 milliGRAM(s)/deciliter  diphenhydrAMINE   Injectable 25 milliGRAM(s) IV Push once PRN Itching  glucagon  Injectable 1 milliGRAM(s) IntraMuscular once PRN Glucose LESS THAN 70 milligrams/deciliter  lidocaine 2% Gel 1 Application(s) Topical two times a day PRN pain around PEG site  sodium chloride 0.9% lock flush 10 milliLiter(s) IV Push every 1 hour PRN Pre/post blood products, medications, blood draw, and to maintain line patency      LABS:                        7.5    8.20  )-----------( 207      ( 08 Jun 2019 07:10 )             25.2     06-08    141  |  106  |  16  ----------------------------<  149<H>  3.8   |  24  |  0.49<L>    Ca    9.3      08 Jun 2019 07:10  Phos  3.2     06-07  Mg     1.5     06-08    TPro  5.7<L>  /  Alb  3.7  /  TBili  0.3  /  DBili  x   /  AST  9<L>  /  ALT  <5<L>  /  AlkPhos  37<L>  06-08

## 2019-06-09 LAB
ANION GAP SERPL CALC-SCNC: 14 MMOL/L — SIGNIFICANT CHANGE UP (ref 5–17)
BUN SERPL-MCNC: 17 MG/DL — SIGNIFICANT CHANGE UP (ref 7–23)
CALCIUM SERPL-MCNC: 8.8 MG/DL — SIGNIFICANT CHANGE UP (ref 8.4–10.5)
CHLORIDE SERPL-SCNC: 104 MMOL/L — SIGNIFICANT CHANGE UP (ref 96–108)
CO2 SERPL-SCNC: 22 MMOL/L — SIGNIFICANT CHANGE UP (ref 22–31)
CREAT SERPL-MCNC: 0.49 MG/DL — LOW (ref 0.5–1.3)
GLUCOSE SERPL-MCNC: 167 MG/DL — HIGH (ref 70–99)
HCT VFR BLD CALC: 23.5 % — LOW (ref 34.5–45)
HGB BLD-MCNC: 7.2 G/DL — LOW (ref 11.5–15.5)
MAGNESIUM SERPL-MCNC: 2.2 MG/DL — SIGNIFICANT CHANGE UP (ref 1.6–2.6)
MCHC RBC-ENTMCNC: 29.1 PG — SIGNIFICANT CHANGE UP (ref 27–34)
MCHC RBC-ENTMCNC: 30.6 GM/DL — LOW (ref 32–36)
MCV RBC AUTO: 95.1 FL — SIGNIFICANT CHANGE UP (ref 80–100)
NRBC # BLD: 0 /100 WBCS — SIGNIFICANT CHANGE UP (ref 0–0)
PHOSPHATE SERPL-MCNC: 3.5 MG/DL — SIGNIFICANT CHANGE UP (ref 2.5–4.5)
PLATELET # BLD AUTO: 269 K/UL — SIGNIFICANT CHANGE UP (ref 150–400)
POTASSIUM SERPL-MCNC: 3.4 MMOL/L — LOW (ref 3.5–5.3)
POTASSIUM SERPL-SCNC: 3.4 MMOL/L — LOW (ref 3.5–5.3)
RBC # BLD: 2.47 M/UL — LOW (ref 3.8–5.2)
RBC # FLD: 15.6 % — HIGH (ref 10.3–14.5)
SODIUM SERPL-SCNC: 140 MMOL/L — SIGNIFICANT CHANGE UP (ref 135–145)
WBC # BLD: 13.09 K/UL — HIGH (ref 3.8–10.5)
WBC # FLD AUTO: 13.09 K/UL — HIGH (ref 3.8–10.5)

## 2019-06-09 PROCEDURE — 71045 X-RAY EXAM CHEST 1 VIEW: CPT | Mod: 26

## 2019-06-09 RX ORDER — I.V. FAT EMULSION 20 G/100ML
0.94 EMULSION INTRAVENOUS
Qty: 50 | Refills: 0 | Status: DISCONTINUED | OUTPATIENT
Start: 2019-06-09 | End: 2019-06-09

## 2019-06-09 RX ORDER — POTASSIUM CHLORIDE 20 MEQ
40 PACKET (EA) ORAL ONCE
Refills: 0 | Status: COMPLETED | OUTPATIENT
Start: 2019-06-09 | End: 2019-06-09

## 2019-06-09 RX ORDER — ELECTROLYTE SOLUTION,INJ
1 VIAL (ML) INTRAVENOUS
Refills: 0 | Status: DISCONTINUED | OUTPATIENT
Start: 2019-06-09 | End: 2019-06-09

## 2019-06-09 RX ORDER — VANCOMYCIN HCL 1 G
500 VIAL (EA) INTRAVENOUS EVERY 12 HOURS
Refills: 0 | Status: DISCONTINUED | OUTPATIENT
Start: 2019-06-09 | End: 2019-06-11

## 2019-06-09 RX ADMIN — CHLORHEXIDINE GLUCONATE 15 MILLILITER(S): 213 SOLUTION TOPICAL at 09:56

## 2019-06-09 RX ADMIN — CHLORHEXIDINE GLUCONATE 15 MILLILITER(S): 213 SOLUTION TOPICAL at 22:54

## 2019-06-09 RX ADMIN — Medication 3 MILLILITER(S): at 18:18

## 2019-06-09 RX ADMIN — MIDODRINE HYDROCHLORIDE 10 MILLIGRAM(S): 2.5 TABLET ORAL at 02:27

## 2019-06-09 RX ADMIN — SODIUM CHLORIDE 3 MILLILITER(S): 9 INJECTION INTRAMUSCULAR; INTRAVENOUS; SUBCUTANEOUS at 02:28

## 2019-06-09 RX ADMIN — INSULIN HUMAN 2: 100 INJECTION, SOLUTION SUBCUTANEOUS at 00:13

## 2019-06-09 RX ADMIN — NYSTATIN CREAM 1 APPLICATION(S): 100000 CREAM TOPICAL at 06:32

## 2019-06-09 RX ADMIN — ENOXAPARIN SODIUM 40 MILLIGRAM(S): 100 INJECTION SUBCUTANEOUS at 18:54

## 2019-06-09 RX ADMIN — NYSTATIN CREAM 1 APPLICATION(S): 100000 CREAM TOPICAL at 18:21

## 2019-06-09 RX ADMIN — SODIUM CHLORIDE 3 MILLILITER(S): 9 INJECTION INTRAMUSCULAR; INTRAVENOUS; SUBCUTANEOUS at 18:19

## 2019-06-09 RX ADMIN — MIDODRINE HYDROCHLORIDE 10 MILLIGRAM(S): 2.5 TABLET ORAL at 09:56

## 2019-06-09 RX ADMIN — Medication 650 MILLIGRAM(S): at 01:48

## 2019-06-09 RX ADMIN — Medication 1 EACH: at 18:18

## 2019-06-09 RX ADMIN — Medication 1000 MILLIGRAM(S): at 07:39

## 2019-06-09 RX ADMIN — PIPERACILLIN AND TAZOBACTAM 200 GRAM(S): 4; .5 INJECTION, POWDER, LYOPHILIZED, FOR SOLUTION INTRAVENOUS at 18:20

## 2019-06-09 RX ADMIN — INSULIN HUMAN 2: 100 INJECTION, SOLUTION SUBCUTANEOUS at 06:33

## 2019-06-09 RX ADMIN — Medication 100 MILLIGRAM(S): at 12:45

## 2019-06-09 RX ADMIN — SODIUM CHLORIDE 3 MILLILITER(S): 9 INJECTION INTRAMUSCULAR; INTRAVENOUS; SUBCUTANEOUS at 14:19

## 2019-06-09 RX ADMIN — I.V. FAT EMULSION 20.83 GM/KG/DAY: 20 EMULSION INTRAVENOUS at 18:18

## 2019-06-09 RX ADMIN — Medication 3 MILLILITER(S): at 11:58

## 2019-06-09 RX ADMIN — Medication 1000 MILLIGRAM(S): at 06:39

## 2019-06-09 RX ADMIN — Medication 3 MILLILITER(S): at 06:32

## 2019-06-09 RX ADMIN — Medication 40 MILLIEQUIVALENT(S): at 11:18

## 2019-06-09 RX ADMIN — SODIUM CHLORIDE 3 MILLILITER(S): 9 INJECTION INTRAMUSCULAR; INTRAVENOUS; SUBCUTANEOUS at 22:54

## 2019-06-09 RX ADMIN — Medication 3 MILLILITER(S): at 00:07

## 2019-06-09 RX ADMIN — CHLORHEXIDINE GLUCONATE 1 APPLICATION(S): 213 SOLUTION TOPICAL at 06:32

## 2019-06-09 RX ADMIN — PIPERACILLIN AND TAZOBACTAM 200 GRAM(S): 4; .5 INJECTION, POWDER, LYOPHILIZED, FOR SOLUTION INTRAVENOUS at 06:33

## 2019-06-09 RX ADMIN — MIDODRINE HYDROCHLORIDE 10 MILLIGRAM(S): 2.5 TABLET ORAL at 18:18

## 2019-06-09 RX ADMIN — FLUDROCORTISONE ACETATE 0.1 MILLIGRAM(S): 0.1 TABLET ORAL at 06:33

## 2019-06-09 RX ADMIN — PIPERACILLIN AND TAZOBACTAM 200 GRAM(S): 4; .5 INJECTION, POWDER, LYOPHILIZED, FOR SOLUTION INTRAVENOUS at 11:57

## 2019-06-09 RX ADMIN — Medication 100 MILLIGRAM(S): at 02:27

## 2019-06-09 RX ADMIN — SODIUM CHLORIDE 3 MILLILITER(S): 9 INJECTION INTRAMUSCULAR; INTRAVENOUS; SUBCUTANEOUS at 06:32

## 2019-06-09 RX ADMIN — PIPERACILLIN AND TAZOBACTAM 200 GRAM(S): 4; .5 INJECTION, POWDER, LYOPHILIZED, FOR SOLUTION INTRAVENOUS at 00:07

## 2019-06-09 RX ADMIN — SODIUM CHLORIDE 3 MILLILITER(S): 9 INJECTION INTRAMUSCULAR; INTRAVENOUS; SUBCUTANEOUS at 09:57

## 2019-06-09 NOTE — PROGRESS NOTE ADULT - SUBJECTIVE AND OBJECTIVE BOX
GYN Progress Note    Seen at bedside this morning, no issues overnight. Denies any difficulty breathing. Denies any pain at this time. Per patient and nursing no leaking of stool per vagina noted. Loose dark brown fluid noted per ostomy.        Vital Signs Last 24 Hrs  T(C): 36.8 (09 Jun 2019 06:18), Max: 38.2 (08 Jun 2019 22:18)  T(F): 98.2 (09 Jun 2019 06:18), Max: 100.7 (08 Jun 2019 22:18)  HR: 72 (09 Jun 2019 06:18) (61 - 83)  BP: 141/75 (09 Jun 2019 06:18) (118/74 - 141/78)  BP(mean): --  RR: 20 (09 Jun 2019 06:18) (14 - 133)  SpO2: 100% (09 Jun 2019 06:18) (99% - 100%)    Physical Exam:  GEN: NAD, AAOx3, comfortable in bed   NEURO: good strength in B/l UE, increased mobility in b/l LE   LUNGS: trach on vent, no respiratory distress   GI: dark liquid fluid per ostomy  : joseph draining yellow, cloudy urine, no stool noted per vagina      I&O's Summary    08 Jun 2019 07:01  -  09 Jun 2019 07:00  --------------------------------------------------------  IN: 1260.6 mL / OUT: 3050 mL / NET: -1789.4 mL      MEDICATIONS  (STANDING):  albumin human  5% IVPB 2750 milliLiter(s) IV Intermittent once  albumin human  5% IVPB 2750 milliLiter(s) IV Intermittent once  ALBUTerol/ipratropium for Nebulization 3 milliLiter(s) Nebulizer every 6 hours  calcium gluconate IVPB 1 Gram(s) IV Intermittent once  calcium gluconate IVPB 1 Gram(s) IV Intermittent once  chlorhexidine 0.12% Liquid 15 milliLiter(s) Oral Mucosa every 12 hours  chlorhexidine 2% Cloths 1 Application(s) Topical <User Schedule>  dextrose 5%. 1000 milliLiter(s) (50 mL/Hr) IV Continuous <Continuous>  dextrose 50% Injectable 25 Gram(s) IV Push once  enoxaparin Injectable 40 milliGRAM(s) SubCutaneous every 24 hours  fat emulsion (Plant Based) 20% Infusion 0.938 Gm/kG/Day (20.832 mL/Hr) IV Continuous <Continuous>  fludroCORTISONE 0.1 milliGRAM(s) Oral every 24 hours  heparin 1000 units/mL 2000 Unit(s) 2000 Unit(s) IV Push once  heparin 1000units/mL 4000 Unit(s) 4000 Unit(s) IV Push once  insulin regular  human corrective regimen sliding scale   SubCutaneous every 6 hours  midodrine 10 milliGRAM(s) Oral every 8 hours  morphine  - Injectable 1 milliGRAM(s) IV Push once  morphine  - Injectable 2 milliGRAM(s) IV Push once  nystatin Cream 1 Application(s) Topical two times a day  Parenteral Nutrition - Adult 1 Each (63 mL/Hr) TPN Continuous <Continuous>  piperacillin/tazobactam IVPB. 4.5 Gram(s) IV Intermittent every 6 hours  sodium chloride 3%  Inhalation 3 milliLiter(s) Inhalation every 4 hours  vancomycin  IVPB 500 milliGRAM(s) IV Intermittent every 12 hours    MEDICATIONS  (PRN):  acetaminophen    Suspension .. 1000 milliGRAM(s) Oral every 8 hours PRN Moderate Pain (4 - 6)  dextrose 40% Gel 15 Gram(s) Oral once PRN Blood Glucose LESS THAN 70 milliGRAM(s)/deciliter  glucagon  Injectable 1 milliGRAM(s) IntraMuscular once PRN Glucose LESS THAN 70 milligrams/deciliter  lidocaine 2% Gel 1 Application(s) Topical two times a day PRN pain around PEG site  sodium chloride 0.9% lock flush 10 milliLiter(s) IV Push every 1 hour PRN Pre/post blood products, medications, blood draw, and to maintain line patency      LABS:                        7.5    8.20  )-----------( 207      ( 08 Jun 2019 07:10 )             25.2     06-08    141  |  106  |  16  ----------------------------<  149<H>  3.8   |  24  |  0.49<L>    Ca    9.3      08 Jun 2019 07:10  Mg     1.5     06-08    TPro  5.7<L>  /  Alb  3.7  /  TBili  0.3  /  DBili  x   /  AST  9<L>  /  ALT  <5<L>  /  AlkPhos  37<L>  06-08

## 2019-06-09 NOTE — PROGRESS NOTE ADULT - ASSESSMENT
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  Hospital course notable for prolonged intubation and MICU admission, transitioned to tracheostomy and PEG tube on 5/21, now weened off pressors and transferred to chronic vent unit on [facility].   [facility] however unable to transfer due to lack of IV IG, thus in house for neurology recommendations for treatment.   Patient has history of enterovaginal fistula which was previously treated conservatively and appeared to have resolved per imaging and resolution of leaking per vagina. Now patient found to have a recurrence of fistula, confirmed on CT scan on 6/5, with leaking of stool noted previously, no stool per vagina yesterday or overnight.     -Fistula: manage conservatively w/ bowel rest. Continue TPN and holding tube feeds. Continue to monitor output per vagina  -: Neurogenic bladder w/ Indwelling joseph in place  ·	When stable, consider urology consult for long term management i.e. for placement suprapubic catheter  - GYN malignancy:  ·	s/p Anastrazole, s/p Megace 80mg BID x3 wks , s/p Tamoxifen 5/1-5/17 (stopped due to upper extremity DVT). pathology serous on review of pathology, when stable for genetic counselor, Once patient recovers from acute neurologic issue will reassess cancer treatment options, - ID: suspected tracheitis - continue zosyn and vancomycin  ·	Will avoid immunotherapy at this time given associated risk of autoimmune disease   - Social work/palliative:  DNR/DNI. Interdisciplinary meeting was done on 5/23.

## 2019-06-10 LAB
ALBUMIN SERPL ELPH-MCNC: 3.6 G/DL — SIGNIFICANT CHANGE UP (ref 3.3–5)
ALP SERPL-CCNC: 60 U/L — SIGNIFICANT CHANGE UP (ref 40–120)
ALT FLD-CCNC: <5 U/L — LOW (ref 10–45)
ANION GAP SERPL CALC-SCNC: 11 MMOL/L — SIGNIFICANT CHANGE UP (ref 5–17)
APTT BLD: 29.9 SEC — SIGNIFICANT CHANGE UP (ref 27.5–36.3)
AST SERPL-CCNC: 9 U/L — LOW (ref 10–40)
BASOPHILS # BLD AUTO: 0.02 K/UL — SIGNIFICANT CHANGE UP (ref 0–0.2)
BASOPHILS NFR BLD AUTO: 0.2 % — SIGNIFICANT CHANGE UP (ref 0–2)
BILIRUB SERPL-MCNC: 0.2 MG/DL — SIGNIFICANT CHANGE UP (ref 0.2–1.2)
BLD GP AB SCN SERPL QL: NEGATIVE — SIGNIFICANT CHANGE UP
BUN SERPL-MCNC: 19 MG/DL — SIGNIFICANT CHANGE UP (ref 7–23)
CALCIUM SERPL-MCNC: 9.3 MG/DL — SIGNIFICANT CHANGE UP (ref 8.4–10.5)
CHLORIDE SERPL-SCNC: 103 MMOL/L — SIGNIFICANT CHANGE UP (ref 96–108)
CO2 SERPL-SCNC: 23 MMOL/L — SIGNIFICANT CHANGE UP (ref 22–31)
CREAT SERPL-MCNC: 0.52 MG/DL — SIGNIFICANT CHANGE UP (ref 0.5–1.3)
CULTURE RESULTS: SIGNIFICANT CHANGE UP
CULTURE RESULTS: SIGNIFICANT CHANGE UP
EOSINOPHIL # BLD AUTO: 0.35 K/UL — SIGNIFICANT CHANGE UP (ref 0–0.5)
EOSINOPHIL NFR BLD AUTO: 3.8 % — SIGNIFICANT CHANGE UP (ref 0–6)
GLUCOSE SERPL-MCNC: 148 MG/DL — HIGH (ref 70–99)
HCT VFR BLD CALC: 23.1 % — LOW (ref 34.5–45)
HGB BLD-MCNC: 7 G/DL — CRITICAL LOW (ref 11.5–15.5)
IMM GRANULOCYTES NFR BLD AUTO: 0.5 % — SIGNIFICANT CHANGE UP (ref 0–1.5)
INR BLD: 1.16 — SIGNIFICANT CHANGE UP (ref 0.88–1.16)
LYMPHOCYTES # BLD AUTO: 1.89 K/UL — SIGNIFICANT CHANGE UP (ref 1–3.3)
LYMPHOCYTES # BLD AUTO: 20.7 % — SIGNIFICANT CHANGE UP (ref 13–44)
MAGNESIUM SERPL-MCNC: 1.9 MG/DL — SIGNIFICANT CHANGE UP (ref 1.6–2.6)
MCHC RBC-ENTMCNC: 29 PG — SIGNIFICANT CHANGE UP (ref 27–34)
MCHC RBC-ENTMCNC: 30.3 GM/DL — LOW (ref 32–36)
MCV RBC AUTO: 95.9 FL — SIGNIFICANT CHANGE UP (ref 80–100)
MONOCYTES # BLD AUTO: 0.73 K/UL — SIGNIFICANT CHANGE UP (ref 0–0.9)
MONOCYTES NFR BLD AUTO: 8 % — SIGNIFICANT CHANGE UP (ref 2–14)
NEUTROPHILS # BLD AUTO: 6.07 K/UL — SIGNIFICANT CHANGE UP (ref 1.8–7.4)
NEUTROPHILS NFR BLD AUTO: 66.8 % — SIGNIFICANT CHANGE UP (ref 43–77)
NRBC # BLD: 0 /100 WBCS — SIGNIFICANT CHANGE UP (ref 0–0)
PHOSPHATE SERPL-MCNC: 3.7 MG/DL — SIGNIFICANT CHANGE UP (ref 2.5–4.5)
PLATELET # BLD AUTO: 288 K/UL — SIGNIFICANT CHANGE UP (ref 150–400)
POTASSIUM SERPL-MCNC: 4.4 MMOL/L — SIGNIFICANT CHANGE UP (ref 3.5–5.3)
POTASSIUM SERPL-SCNC: 4.4 MMOL/L — SIGNIFICANT CHANGE UP (ref 3.5–5.3)
PROT SERPL-MCNC: 5.9 G/DL — LOW (ref 6–8.3)
PROTHROM AB SERPL-ACNC: 13.2 SEC — HIGH (ref 10–12.9)
RBC # BLD: 2.41 M/UL — LOW (ref 3.8–5.2)
RBC # FLD: 15.9 % — HIGH (ref 10.3–14.5)
RH IG SCN BLD-IMP: POSITIVE — SIGNIFICANT CHANGE UP
SODIUM SERPL-SCNC: 137 MMOL/L — SIGNIFICANT CHANGE UP (ref 135–145)
SPECIMEN SOURCE: SIGNIFICANT CHANGE UP
SPECIMEN SOURCE: SIGNIFICANT CHANGE UP
VANCOMYCIN TROUGH SERPL-MCNC: 19.3 UG/ML — SIGNIFICANT CHANGE UP (ref 10–20)
WBC # BLD: 9.11 K/UL — SIGNIFICANT CHANGE UP (ref 3.8–10.5)
WBC # FLD AUTO: 9.11 K/UL — SIGNIFICANT CHANGE UP (ref 3.8–10.5)

## 2019-06-10 PROCEDURE — 99233 SBSQ HOSP IP/OBS HIGH 50: CPT | Mod: GC

## 2019-06-10 PROCEDURE — 99233 SBSQ HOSP IP/OBS HIGH 50: CPT

## 2019-06-10 PROCEDURE — 99232 SBSQ HOSP IP/OBS MODERATE 35: CPT

## 2019-06-10 RX ORDER — ELECTROLYTE SOLUTION,INJ
1 VIAL (ML) INTRAVENOUS
Refills: 0 | Status: DISCONTINUED | OUTPATIENT
Start: 2019-06-10 | End: 2019-06-10

## 2019-06-10 RX ADMIN — MIDODRINE HYDROCHLORIDE 10 MILLIGRAM(S): 2.5 TABLET ORAL at 01:49

## 2019-06-10 RX ADMIN — SODIUM CHLORIDE 3 MILLILITER(S): 9 INJECTION INTRAMUSCULAR; INTRAVENOUS; SUBCUTANEOUS at 10:04

## 2019-06-10 RX ADMIN — PIPERACILLIN AND TAZOBACTAM 200 GRAM(S): 4; .5 INJECTION, POWDER, LYOPHILIZED, FOR SOLUTION INTRAVENOUS at 00:50

## 2019-06-10 RX ADMIN — Medication 3 MILLILITER(S): at 12:42

## 2019-06-10 RX ADMIN — SODIUM CHLORIDE 3 MILLILITER(S): 9 INJECTION INTRAMUSCULAR; INTRAVENOUS; SUBCUTANEOUS at 21:57

## 2019-06-10 RX ADMIN — Medication 3 MILLILITER(S): at 06:56

## 2019-06-10 RX ADMIN — Medication 3 MILLILITER(S): at 00:50

## 2019-06-10 RX ADMIN — PIPERACILLIN AND TAZOBACTAM 200 GRAM(S): 4; .5 INJECTION, POWDER, LYOPHILIZED, FOR SOLUTION INTRAVENOUS at 06:56

## 2019-06-10 RX ADMIN — ENOXAPARIN SODIUM 40 MILLIGRAM(S): 100 INJECTION SUBCUTANEOUS at 19:03

## 2019-06-10 RX ADMIN — SODIUM CHLORIDE 3 MILLILITER(S): 9 INJECTION INTRAMUSCULAR; INTRAVENOUS; SUBCUTANEOUS at 17:31

## 2019-06-10 RX ADMIN — PIPERACILLIN AND TAZOBACTAM 200 GRAM(S): 4; .5 INJECTION, POWDER, LYOPHILIZED, FOR SOLUTION INTRAVENOUS at 17:31

## 2019-06-10 RX ADMIN — NYSTATIN CREAM 1 APPLICATION(S): 100000 CREAM TOPICAL at 06:55

## 2019-06-10 RX ADMIN — MIDODRINE HYDROCHLORIDE 10 MILLIGRAM(S): 2.5 TABLET ORAL at 17:31

## 2019-06-10 RX ADMIN — INSULIN HUMAN 2: 100 INJECTION, SOLUTION SUBCUTANEOUS at 12:42

## 2019-06-10 RX ADMIN — MIDODRINE HYDROCHLORIDE 10 MILLIGRAM(S): 2.5 TABLET ORAL at 10:04

## 2019-06-10 RX ADMIN — Medication 1 EACH: at 18:04

## 2019-06-10 RX ADMIN — CHLORHEXIDINE GLUCONATE 15 MILLILITER(S): 213 SOLUTION TOPICAL at 10:03

## 2019-06-10 RX ADMIN — SODIUM CHLORIDE 3 MILLILITER(S): 9 INJECTION INTRAMUSCULAR; INTRAVENOUS; SUBCUTANEOUS at 01:50

## 2019-06-10 RX ADMIN — CHLORHEXIDINE GLUCONATE 15 MILLILITER(S): 213 SOLUTION TOPICAL at 21:57

## 2019-06-10 RX ADMIN — Medication 3 MILLILITER(S): at 23:37

## 2019-06-10 RX ADMIN — SODIUM CHLORIDE 3 MILLILITER(S): 9 INJECTION INTRAMUSCULAR; INTRAVENOUS; SUBCUTANEOUS at 13:42

## 2019-06-10 RX ADMIN — FLUDROCORTISONE ACETATE 0.1 MILLIGRAM(S): 0.1 TABLET ORAL at 06:57

## 2019-06-10 RX ADMIN — CHLORHEXIDINE GLUCONATE 1 APPLICATION(S): 213 SOLUTION TOPICAL at 06:55

## 2019-06-10 RX ADMIN — Medication 100 MILLIGRAM(S): at 00:50

## 2019-06-10 RX ADMIN — NYSTATIN CREAM 1 APPLICATION(S): 100000 CREAM TOPICAL at 17:30

## 2019-06-10 RX ADMIN — Medication 3 MILLILITER(S): at 17:30

## 2019-06-10 RX ADMIN — PIPERACILLIN AND TAZOBACTAM 200 GRAM(S): 4; .5 INJECTION, POWDER, LYOPHILIZED, FOR SOLUTION INTRAVENOUS at 23:37

## 2019-06-10 RX ADMIN — PIPERACILLIN AND TAZOBACTAM 200 GRAM(S): 4; .5 INJECTION, POWDER, LYOPHILIZED, FOR SOLUTION INTRAVENOUS at 12:42

## 2019-06-10 RX ADMIN — Medication 100 MILLIGRAM(S): at 13:42

## 2019-06-10 RX ADMIN — SODIUM CHLORIDE 3 MILLILITER(S): 9 INJECTION INTRAMUSCULAR; INTRAVENOUS; SUBCUTANEOUS at 06:56

## 2019-06-10 NOTE — PROGRESS NOTE ADULT - ASSESSMENT
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  Hospital course notable for prolonged intubation and MICU admission, transitioned to tracheostomy and PEG tube on 5/21, now weened off pressors and transferred to chronic vent unit on [facility].   [facility] unable to transfer due to lack of IV IG, thus in house for neurology recommendations for treatment.   Patient has history of enterovaginal fistula which was previously treated conservatively and appeared to have resolved per imaging and resolution of leaking per vagina. Now patient found to have a recurrence of fistula, confirmed on CT scan on 6/5, with leaking of stool noted previously, reports stool leakage from vagina overnight,.    -Fistula: manage conservatively w/ bowel rest. Continue TPN and holding tube feeds. Continue to monitor output per vagina  -: Neurogenic bladder w/ Indwelling joseph in place  ·	When stable, consider urology consult for long term management i.e. for placement suprapubic catheter  - GYN malignancy:  ·	s/p Anastrazole, s/p Megace 80mg BID x3 wks , s/p Tamoxifen 5/1-5/17 (stopped due to upper extremity DVT). pathology serous on review of pathology, when stable for genetic counselor, Once patient recovers from acute neurologic issue will reassess cancer treatment options, - ID: suspected tracheitis - continue zosyn and vancomycin  ·	Will avoid immunotherapy at this time given associated risk of autoimmune disease   - Social work/palliative:  DNR/DNI. Interdisciplinary meeting was done on 5/23.

## 2019-06-10 NOTE — PROGRESS NOTE ADULT - SUBJECTIVE AND OBJECTIVE BOX
NEUROLOGY CONSULT PROGRESS NOTE    INTERVAL HISTORY:  KESHAWN    REVIEW OF SYSTEMS:  As per HPI, otherwise negative for Constitutional, Eyes, Ears/Nose/Mouth/Throat, Neck, Cardiovascular, Respiratory, Gastrointestinal, Genitourinary, Skin, Endocrine, Musculoskeletal, Psychiatric, and Hematologic/Lymphatic.    MEDICATIONS:  acetaminophen    Suspension .. 1000 milliGRAM(s) Oral every 8 hours PRN  albumin human  5% IVPB 2750 milliLiter(s) IV Intermittent once  albumin human  5% IVPB 2750 milliLiter(s) IV Intermittent once  ALBUTerol/ipratropium for Nebulization 3 milliLiter(s) Nebulizer every 6 hours  calcium gluconate IVPB 1 Gram(s) IV Intermittent once  calcium gluconate IVPB 1 Gram(s) IV Intermittent once  chlorhexidine 0.12% Liquid 15 milliLiter(s) Oral Mucosa every 12 hours  chlorhexidine 2% Cloths 1 Application(s) Topical <User Schedule>  dextrose 40% Gel 15 Gram(s) Oral once PRN  dextrose 5%. 1000 milliLiter(s) IV Continuous <Continuous>  dextrose 50% Injectable 25 Gram(s) IV Push once  enoxaparin Injectable 40 milliGRAM(s) SubCutaneous every 24 hours  fat emulsion (Plant Based) 20% Infusion 0.938 Gm/kG/Day IV Continuous <Continuous>  fludroCORTISONE 0.1 milliGRAM(s) Oral every 24 hours  glucagon  Injectable 1 milliGRAM(s) IntraMuscular once PRN  heparin 1000 units/mL 2000 Unit(s) 2000 Unit(s) IV Push once  heparin 1000units/mL 4000 Unit(s) 4000 Unit(s) IV Push once  insulin regular  human corrective regimen sliding scale   SubCutaneous every 6 hours  lidocaine 2% Gel 1 Application(s) Topical two times a day PRN  midodrine 10 milliGRAM(s) Oral every 8 hours  morphine  - Injectable 1 milliGRAM(s) IV Push once  morphine  - Injectable 2 milliGRAM(s) IV Push once  nystatin Cream 1 Application(s) Topical two times a day  Parenteral Nutrition - Adult 1 Each TPN Continuous <Continuous>  piperacillin/tazobactam IVPB. 4.5 Gram(s) IV Intermittent every 6 hours  sodium chloride 0.9% lock flush 10 milliLiter(s) IV Push every 1 hour PRN  sodium chloride 3%  Inhalation 3 milliLiter(s) Inhalation every 4 hours  vancomycin  IVPB 500 milliGRAM(s) IV Intermittent every 12 hours    VITAL SIGNS:  Vital Signs Last 24 Hrs  T(C): 36.8 (10 Román 2019 05:15), Max: 37.3 (09 Jun 2019 14:58)  T(F): 98.2 (10 Román 2019 05:15), Max: 99.1 (09 Jun 2019 14:58)  HR: 69 (10 Román 2019 05:15) (69 - 80)  BP: 140/73 (10 Román 2019 05:15) (123/80 - 152/90)  BP(mean): --  RR: 20 (10 Román 2019 05:15) (17 - 22)  SpO2: 97% (10 Román 2019 06:19) (97% - 100%)    PHYSICAL EXAMINATION:  Constitutional: WDWN; NAD  Eyes: anicteric sclera  Cardiovascular: +S1/S2, RRR; no M/R/G  Neurologic:  - Mental Status:  AAOx3; speech is fluent with intact naming, repetition, and comprehension  - Cranial Nerves II-XII:    II:  PERRLA; visual fields are full to confrontation  III, IV, VI:  EOMI, no nystagmus  V:  facial sensation is intact in the V1-V3 distribution bilaterally.  VII:  face is symmetric with normal eye closure and smile  VIII:  hearing is intact to finger rub  IX, X:  uvula is midline and soft palate rises symmetrically  XI:  head turning and shoulder shrug are intact bilaterally  XII:  tongue protrudes in the midline  - Motor:  strength is 5/5 throughout; normal muscle bulk and tone throughout; no pronator drift  - Reflexes:  2+ and symmetric at the biceps, triceps, brachioradialis, knees, and ankles;  plantar reflexes downgoing bilaterally  - Sensory:  intact to light touch, pin prick, vibration, and joint-position sense throughout  - Coordination:  finger-nose-finger and heel-knee-shin intact without dysmetria; rapid alternating hand movements intact      LABS:                          7.2    13.09 )-----------( 269      ( 09 Jun 2019 07:50 )             23.5     06-09    140  |  104  |  17  ----------------------------<  167<H>  3.4<L>   |  22  |  0.49<L>    Ca    8.8      09 Jun 2019 07:50  Phos  3.5     06-09  Mg     2.2     06-09    TPro  5.7<L>  /  Alb  3.7  /  TBili  0.3  /  DBili  x   /  AST  9<L>  /  ALT  <5<L>  /  AlkPhos  37<L>  06-08          RADIOLOGY & ADDITIONAL STUDIES:      IMPRESSION & RECOMMENDATIONS: NEUROLOGY CONSULT PROGRESS NOTE    INTERVAL HISTORY:  KESHAWN    REVIEW OF SYSTEMS:  As per HPI, otherwise negative for Constitutional, Eyes, Ears/Nose/Mouth/Throat, Neck, Cardiovascular, Respiratory, Gastrointestinal, Genitourinary, Skin, Endocrine, Musculoskeletal, Psychiatric, and Hematologic/Lymphatic.    MEDICATIONS:  acetaminophen    Suspension .. 1000 milliGRAM(s) Oral every 8 hours PRN  albumin human  5% IVPB 2750 milliLiter(s) IV Intermittent once  albumin human  5% IVPB 2750 milliLiter(s) IV Intermittent once  ALBUTerol/ipratropium for Nebulization 3 milliLiter(s) Nebulizer every 6 hours  calcium gluconate IVPB 1 Gram(s) IV Intermittent once  calcium gluconate IVPB 1 Gram(s) IV Intermittent once  chlorhexidine 0.12% Liquid 15 milliLiter(s) Oral Mucosa every 12 hours  chlorhexidine 2% Cloths 1 Application(s) Topical <User Schedule>  dextrose 40% Gel 15 Gram(s) Oral once PRN  dextrose 5%. 1000 milliLiter(s) IV Continuous <Continuous>  dextrose 50% Injectable 25 Gram(s) IV Push once  enoxaparin Injectable 40 milliGRAM(s) SubCutaneous every 24 hours  fat emulsion (Plant Based) 20% Infusion 0.938 Gm/kG/Day IV Continuous <Continuous>  fludroCORTISONE 0.1 milliGRAM(s) Oral every 24 hours  glucagon  Injectable 1 milliGRAM(s) IntraMuscular once PRN  heparin 1000 units/mL 2000 Unit(s) 2000 Unit(s) IV Push once  heparin 1000units/mL 4000 Unit(s) 4000 Unit(s) IV Push once  insulin regular  human corrective regimen sliding scale   SubCutaneous every 6 hours  lidocaine 2% Gel 1 Application(s) Topical two times a day PRN  midodrine 10 milliGRAM(s) Oral every 8 hours  morphine  - Injectable 1 milliGRAM(s) IV Push once  morphine  - Injectable 2 milliGRAM(s) IV Push once  nystatin Cream 1 Application(s) Topical two times a day  Parenteral Nutrition - Adult 1 Each TPN Continuous <Continuous>  piperacillin/tazobactam IVPB. 4.5 Gram(s) IV Intermittent every 6 hours  sodium chloride 0.9% lock flush 10 milliLiter(s) IV Push every 1 hour PRN  sodium chloride 3%  Inhalation 3 milliLiter(s) Inhalation every 4 hours  vancomycin  IVPB 500 milliGRAM(s) IV Intermittent every 12 hours    VITAL SIGNS:  Vital Signs Last 24 Hrs  T(C): 36.8 (10 Román 2019 05:15), Max: 37.3 (09 Jun 2019 14:58)  T(F): 98.2 (10 Román 2019 05:15), Max: 99.1 (09 Jun 2019 14:58)  HR: 69 (10 Román 2019 05:15) (69 - 80)  BP: 140/73 (10 Román 2019 05:15) (123/80 - 152/90)  BP(mean): --  RR: 20 (10 Román 2019 05:15) (17 - 22)  SpO2: 97% (10 Román 2019 06:19) (97% - 100%)    PHYSICAL EXAMINATION:  Constitutional: WDWN; NAD  Eyes: anicteric sclera  Cardiovascular: +S1/S2, RRR; no M/R/G  Neurologic:  - Mental Status:  AAOx3; speech appears fluent with intact naming, repetition, and comprehension  - Cranial Nerves II-XII:    II:  PERRLA; visual fields are full to confrontation  III, IV, VI:  EOMI, no nystagmus  V:  facial sensation is intact in the V1-V3 distribution bilaterally.  VII:  face is symmetric with normal eye closure and smile  IX, X:  uvula is midline and soft palate rises symmetrically  XI:  head turning and shoulder shrug are intact bilaterally  XII:  tongue protrudes in the midline  - Motor:  strength is 4-5 in upper extremity and 1-2 in lower extremity, unchanged  - Reflexes:  areflexic  - Sensory:  intact to light touch, pin prick, vibration, and joint-position sense throughout  - Coordination:  finger-nose-finger and heel-knee-shin intact without dysmetria; rapid alternating hand movements intact      LABS:                          7.2    13.09 )-----------( 269      ( 09 Jun 2019 07:50 )             23.5     06-09    140  |  104  |  17  ----------------------------<  167<H>  3.4<L>   |  22  |  0.49<L>    Ca    8.8      09 Jun 2019 07:50  Phos  3.5     06-09  Mg     2.2     06-09    TPro  5.7<L>  /  Alb  3.7  /  TBili  0.3  /  DBili  x   /  AST  9<L>  /  ALT  <5<L>  /  AlkPhos  37<L>  06-08          RADIOLOGY & ADDITIONAL STUDIES:      IMPRESSION & RECOMMENDATIONS: NEUROLOGY CONSULT PROGRESS NOTE    INTERVAL HISTORY:  No acute events overnight. Feels strength in arms is good / stable. Legs still weak. Still with some secretions although improved.     REVIEW OF SYSTEMS:  As per HPI, otherwise negative for Constitutional, Eyes, Ears/Nose/Mouth/Throat, Neck, Cardiovascular, Respiratory, Gastrointestinal, Genitourinary, Skin, Endocrine, Musculoskeletal, Psychiatric, and Hematologic/Lymphatic.    MEDICATIONS:  acetaminophen    Suspension .. 1000 milliGRAM(s) Oral every 8 hours PRN  albumin human  5% IVPB 2750 milliLiter(s) IV Intermittent once  albumin human  5% IVPB 2750 milliLiter(s) IV Intermittent once  ALBUTerol/ipratropium for Nebulization 3 milliLiter(s) Nebulizer every 6 hours  calcium gluconate IVPB 1 Gram(s) IV Intermittent once  calcium gluconate IVPB 1 Gram(s) IV Intermittent once  chlorhexidine 0.12% Liquid 15 milliLiter(s) Oral Mucosa every 12 hours  chlorhexidine 2% Cloths 1 Application(s) Topical <User Schedule>  dextrose 40% Gel 15 Gram(s) Oral once PRN  dextrose 5%. 1000 milliLiter(s) IV Continuous <Continuous>  dextrose 50% Injectable 25 Gram(s) IV Push once  enoxaparin Injectable 40 milliGRAM(s) SubCutaneous every 24 hours  fat emulsion (Plant Based) 20% Infusion 0.938 Gm/kG/Day IV Continuous <Continuous>  fludroCORTISONE 0.1 milliGRAM(s) Oral every 24 hours  glucagon  Injectable 1 milliGRAM(s) IntraMuscular once PRN  heparin 1000 units/mL 2000 Unit(s) 2000 Unit(s) IV Push once  heparin 1000units/mL 4000 Unit(s) 4000 Unit(s) IV Push once  insulin regular  human corrective regimen sliding scale   SubCutaneous every 6 hours  lidocaine 2% Gel 1 Application(s) Topical two times a day PRN  midodrine 10 milliGRAM(s) Oral every 8 hours  morphine  - Injectable 1 milliGRAM(s) IV Push once  morphine  - Injectable 2 milliGRAM(s) IV Push once  nystatin Cream 1 Application(s) Topical two times a day  Parenteral Nutrition - Adult 1 Each TPN Continuous <Continuous>  piperacillin/tazobactam IVPB. 4.5 Gram(s) IV Intermittent every 6 hours  sodium chloride 0.9% lock flush 10 milliLiter(s) IV Push every 1 hour PRN  sodium chloride 3%  Inhalation 3 milliLiter(s) Inhalation every 4 hours  vancomycin  IVPB 500 milliGRAM(s) IV Intermittent every 12 hours    VITAL SIGNS:  Vital Signs Last 24 Hrs  T(C): 36.8 (10 Román 2019 05:15), Max: 37.3 (09 Jun 2019 14:58)  T(F): 98.2 (10 Román 2019 05:15), Max: 99.1 (09 Jun 2019 14:58)  HR: 69 (10 Román 2019 05:15) (69 - 80)  BP: 140/73 (10 Román 2019 05:15) (123/80 - 152/90)  BP(mean): --  RR: 20 (10 Román 2019 05:15) (17 - 22)  SpO2: 97% (10 Román 2019 06:19) (97% - 100%)    PHYSICAL EXAMINATION:  Constitutional: WDWN; NAD  Eyes: anicteric sclera  Cardiovascular: +S1/S2, RRR; no M/R/G  Neurologic:  - Mental Status:  AAOx3; speech appears fluent with intact naming, repetition, and comprehension  - Cranial Nerves II-XII:    II:  PERRLA; visual fields are full to confrontation  III, IV, VI:  EOMI, no nystagmus  V:  facial sensation is intact in the V1-V3 distribution bilaterally.  VII:  face is symmetric with normal eye closure and smile  IX, X:  uvula is midline and soft palate rises symmetrically  XI:  head turning and shoulder shrug are intact bilaterally  XII:  tongue protrudes in the midline  - Motor:  strength is 4/5 in deltoids b/l, 4+ otherwise in UE; 1-2 in lower extremity, unchanged  - Reflexes:  areflexic  - Sensory:  intact to light touch throughout  - Coordination:  finger-nose-finger intact b/l, rapid alternating hand movements intact      LABS:                          7.2    13.09 )-----------( 269      ( 09 Jun 2019 07:50 )             23.5     06-09    140  |  104  |  17  ----------------------------<  167<H>  3.4<L>   |  22  |  0.49<L>    Ca    8.8      09 Jun 2019 07:50  Phos  3.5     06-09  Mg     2.2     06-09    TPro  5.7<L>  /  Alb  3.7  /  TBili  0.3  /  DBili  x   /  AST  9<L>  /  ALT  <5<L>  /  AlkPhos  37<L>  06-08          RADIOLOGY & ADDITIONAL STUDIES:      IMPRESSION & RECOMMENDATIONS:

## 2019-06-10 NOTE — PROGRESS NOTE ADULT - PROBLEM SELECTOR PLAN 8
Hx of urinary retention/overflow incontinence   - Joseph changed on 5/17   - ct abdomen shows mild L hydro w obstructing stone on 6/6  - per urology, continue w joseph    #PEG placement  - Pt s/p PEG placement 5/21   - holding Osmolite 1.2     #rectovaginal fistula  - per GYN onc, hx of recto vaginal fistula that resolved spontaneously, however now has recurred  - f/u CT abd and pelvis w oral and IV contrast - confirmed entero vaginal fistula on CT pelvis  - appreciate gyn recs  -NPO  -TPN

## 2019-06-10 NOTE — PROGRESS NOTE ADULT - ASSESSMENT
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 Clinical worsening earlier in week likely 2/2 to anemia more than relapse of disease as improvement was seen prior to PLEX. Patient will need continued PLEX at least for 3 months with reassessment after.    -Plasma exchange 1 session in 1 month, to be re-assessed by Dr. Sellers who will manage outpatient care.  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 Clinical worsening earlier in week likely 2/2 to anemia more than relapse of disease as improvement was seen prior to PLEX. Patient will need continued PLEX at least for 3 months with reassessment after.    -Plasma exchange 1 session in 1 month (6/30), to be re-assessed by Dr. Sellers who will manage outpatient care.  -please ensure proper central line care instructions for LTAC  -Mobilize to chair and CPAP trials as tolerated  -PT as tolerated

## 2019-06-10 NOTE — PROGRESS NOTE ADULT - ATTENDING COMMENTS
Strength in UE is best it has been this admission, with only mild weakness mostly in deltoids.   Legs are still significantly weak, partially from polio, partially from relapsing AIDP vs. CIDP  CPAP trial shows tidal volumes around 250, not tachypneic - continue as long as she can tolerate to try to wean from ventilator  No neurologic contraindication to discharge. Should be readmitted around 6/30 for another session of plasmapheresis

## 2019-06-10 NOTE — PROGRESS NOTE ADULT - PROBLEM SELECTOR PLAN 1
Patient currently Vent dependent due to AIDP. She has been improving and was tolerating CPAP for longer periods of time.     Vent log were reviewed after first episode and Peak pressures were elevated due to suspected mucous plug.   She was started on bronchodilators but had another episode today. Secretions suctioned today by Pulm team where thick and purulent appearing. Would treat for tracheitis but obtain good sputum sample prior to treatment.     Another possibility is intermittent obstruction by posterior membrane however this is less likely.     Chest Ct shows areas of atelectasis without areas suspicious for pneumonia. Seen on CT is a collapsable Trachea at the distal end of the Tracheostomy tube.     Sputum without pathogens, however collected after 24 hours of Abx.     Recommendations:  - Continue Bronchodilators  - Complete course of treatment for presumed tracheitis with 7 days of Abx. To end tomorrow June 11.  Vancomycin and zosyn while inpatient. Linezolid may be good options for transitioning to oral meds on discharge, however if LTAC capable of administering Vanc/Zosyn, would continue.

## 2019-06-10 NOTE — PROGRESS NOTE ADULT - PROBLEM SELECTOR PLAN 1
2/2 to demyelinating polyneuropathy (AIDP), patient has required 3 intubations this admission, now s/p trach (5/21) and s/p 4 doses of IVIG finished 5/11 and 5 rounds of plasmapheresis (finished 5/21), additional 2 rounds of plasmapheresis finished on 6/6  - C/w mechanical ventilation at night and cpap during day. wean as tolerated  - C/w frequent suctioning (q4h)  - pulm consult for increased secretions and possible mucus plugging - duonebs q6h standing and added vanc and zosyn for tracheitis  - Per neurology, patient will require 2 rounds of IVIG qMonthly for 3 months. Then neurology will re-evaluate need for more treatment.  - Plan for patient to return to [facility] on June 30th for next round of plasmapheresis. LTAC physician should call to give signout to admitting team prior to admission.

## 2019-06-10 NOTE — PROVIDER CONTACT NOTE (CRITICAL VALUE NOTIFICATION) - TEST AND RESULT REPORTED:
Vanc 26.5
ABG result: pCO2=96
Hgb 6.7
K=7.1
Platelets 295
anaerobic bottle gram negative rods
hemoglobin/hematocrit 7.0/23.1

## 2019-06-10 NOTE — PROGRESS NOTE ADULT - ASSESSMENT
58 yo F PMHx polio, breast cancer, DM, Endometrial Cancer s/p exploratory laparotomy, enterolysis, SHAMIR, BSO, pelvic lymphadenectomy, low anterior resection mobilization of splenic flexure, end colostomy on 7/30/18, rectovaginal fistula, numerous admissions for urinary tract infection presented to [facility] in the setting of urosepsis. Hospital course complicated by ESBL E coli bacteremia (completed ertapenem on 5/14 ) and respiratory failure requiring multiple intubations 2/2 AIDP, now s/p trach and PEG (5/21), s/p treatment for sepsis 2/2 MRSA PNA w/ linezolid. Now on vanco/zosyn for tracheitis.  Now s/p 2nd round of plasmapheresis. Now with re-opening of enteric-vaginal fistula, on strict NPO requiring TPN. Now stable for LTAC pending bed acceptance.

## 2019-06-10 NOTE — PROGRESS NOTE ADULT - SUBJECTIVE AND OBJECTIVE BOX
Interval Events:  Patient seen and examined at bedside.    Patient feels well, cough has subsided and mucous production also decreased. Sputum culture showing normal respiratory fani however was collected after 24 horus of Abx.   Vent log was reviewed and Peak pressures below 30 for the majority of the time.     MEDICATIONS:  Pulmonary:  ALBUTerol/ipratropium for Nebulization 3 milliLiter(s) Nebulizer every 6 hours  sodium chloride 3%  Inhalation 3 milliLiter(s) Inhalation every 4 hours    Antimicrobials:  piperacillin/tazobactam IVPB. 4.5 Gram(s) IV Intermittent every 6 hours  vancomycin  IVPB 500 milliGRAM(s) IV Intermittent every 12 hours    Anticoagulants:  enoxaparin Injectable 40 milliGRAM(s) SubCutaneous every 24 hours    Cardiac:  midodrine 10 milliGRAM(s) Oral every 8 hours    Endocrine:  dextrose 40% Gel 15 Gram(s) Oral once PRN  dextrose 50% Injectable 25 Gram(s) IV Push once  fludroCORTISONE 0.1 milliGRAM(s) Oral every 24 hours  glucagon  Injectable 1 milliGRAM(s) IntraMuscular once PRN  insulin regular  human corrective regimen sliding scale   SubCutaneous every 6 hours    Allergies    No Known Allergies    Intolerances    IVIG PRODUCT IS PRIGIVEN OR GAMMUNEX (Unknown)  PeriGuard (Pruritus (Mild to Mod))      Vital Signs Last 24 Hrs  T(C): 37.4 (10 Román 2019 16:46), Max: 37.4 (10 Román 2019 16:46)  T(F): 99.3 (10 Román 2019 16:46), Max: 99.3 (10 Román 2019 16:46)  HR: 76 (10 Román 2019 16:46) (69 - 76)  BP: 114/76 (10 Román 2019 16:46) (114/76 - 140/73)  BP(mean): --  RR: 17 (10 Román 2019 16:46) (16 - 20)  SpO2: 100% (10 Román 2019 16:46) (97% - 100%)    06-09 @ 07:01  -  06-10 @ 07:00  --------------------------------------------------------  IN: 966 mL / OUT: 2375 mL / NET: -1409 mL    06-10 @ 07:01  -  06-10 @ 16:58  --------------------------------------------------------  IN: 441 mL / OUT: 900 mL / NET: -459 mL      Mode: AC/ CMV (Assist Control/ Continuous Mandatory Ventilation)  RR (machine): 12  TV (machine): 320  FiO2: 40  PEEP: 5  ITime: 1  MAP: 8.2  PIP: 22    Physical Exam:  General: NAD, AAO x3  HEENT: No icterus,. Moist mucous membranes  Neck: No JVD noted. Supple, no meningismus  Cardio: S1, S2 noted, RRR. No murmurs, rubs or gallops  Resp: bibasilar rales, some faint transmitted sounds , otherwise no rhonchi or wheezing.   Abdo: Soft, NT, bowel sounds present. No organomegaly  Extremities: No edema noted. Pulses present b/l  Neuro: AAO x3, grossly normal motor strength.  Lymphnodes: no lymphadenopathy identified.  Skin: Dry, no rashes    LABS:  CBC Full  -  ( 10 Román 2019 06:51 )  WBC Count : 9.11 K/uL  RBC Count : 2.41 M/uL  Hemoglobin : 7.0 g/dL  Hematocrit : 23.1 %  Platelet Count - Automated : 288 K/uL  Mean Cell Volume : 95.9 fl  Mean Cell Hemoglobin : 29.0 pg  Mean Cell Hemoglobin Concentration : 30.3 gm/dL  Auto Neutrophil # : 6.07 K/uL  Auto Lymphocyte # : 1.89 K/uL  Auto Monocyte # : 0.73 K/uL  Auto Eosinophil # : 0.35 K/uL  Auto Basophil # : 0.02 K/uL  Auto Neutrophil % : 66.8 %  Auto Lymphocyte % : 20.7 %  Auto Monocyte % : 8.0 %  Auto Eosinophil % : 3.8 %  Auto Basophil % : 0.2 %    06-10    137  |  103  |  19  ----------------------------<  148<H>  4.4   |  23  |  0.52    Ca    9.3      10 Román 2019 06:51  Phos  3.7     06-10  Mg     1.9     06-10    TPro  5.9<L>  /  Alb  3.6  /  TBili  0.2  /  DBili  x   /  AST  9<L>  /  ALT  <5<L>  /  AlkPhos  60  06-10    PT/INR - ( 10 Román 2019 06:51 )   PT: 13.2 sec;   INR: 1.16        PTT - ( 10 Román 2019 06:51 )  PTT:29.9 sec    RADIOLOGY & ADDITIONAL STUDIES (The following images were personally reviewed):  reviewed.

## 2019-06-10 NOTE — PROGRESS NOTE ADULT - SUBJECTIVE AND OBJECTIVE BOX
Pt evaluated at bedside. No acute events overnight. Patient reports feeling depressed. The biggest factor aiding to her depression is her inability to walk. Patient offered to speak with psychiatry but patient declined. Patient otherwise denies pain, difficulty breathing. She reports leakage of stool from her vagina this morning.     T(C): 36.8 (06-10-19 @ 05:15), Max: 36.8 (06-09-19 @ 21:48)  HR: 69 (06-10-19 @ 05:15) (69 - 76)  BP: 140/73 (06-10-19 @ 05:15) (139/76 - 140/73)  RR: 20 (06-10-19 @ 05:15) (17 - 20)  SpO2: 97% (06-10-19 @ 06:19) (97% - 100%)  GA: NAD  Pulm: nonlabored breathing through trach tube  Abd: soft, NTND, no rebound or guarding, scant dark liquid fluid per ostomy  : joseph in place, no stool on external genitalia   Extrem: SCDs in place    06-09 @ 07:01  -  06-10 @ 07:00  --------------------------------------------------------  IN: 966 mL / OUT: 2375 mL / NET: -1409 mL                              7.0    9.11  )-----------( 288      ( 10 Román 2019 06:51 )             23.1     06-10    137  |  103  |  19  ----------------------------<  148<H>  4.4   |  23  |  0.52    Ca    9.3      10 Román 2019 06:51  Phos  3.7     06-10  Mg     1.9     06-10    TPro  5.9<L>  /  Alb  3.6  /  TBili  0.2  /  DBili  x   /  AST  9<L>  /  ALT  <5<L>  /  AlkPhos  60  06-10

## 2019-06-10 NOTE — PROGRESS NOTE ADULT - ATTENDING COMMENTS
Patient was seen and examined with the resident team today.  I agree with Dr. Vincent's assessment and plan with the following exceptions/additions:     This is a 60yo woman with a PMH of polio c/b post-polio syndrome, breast cancer, endometrial cancer s/p explap, enterolysis, SHAMIR, BSO, pelvic lymphadenectomy, low anterior resection mobilization of splenic flexure and end colostomy (7/30/18), rectovaginal fistula, as well as, numerous admissions for urinary tract infections who was initially admitted for ESBL E. coli bacteremia c/b septic shock, now s/p a protracted hospital course.  Admission has been complicated by acute (now chronic) hypercapnic respiratory failure requiring multiple intubations 2/2 AIDP (s/p IVIG and plasmapheresis), now s/p trach and PEG ( 5/21).  Latter part of hospitalization developed another episode of sepsis 2/2 MRSA HAP/VAP and VRE UTI.  Last day of antibiotics on 5/30.  Patient was nearing discharge on 5/31, but her discharge was delayed 2/2 difficult outpatient coordination of treatment.  She underwent plasmapheresis x2 the week of 6/31 without complication but did require 1U PRBC given her chronic anemia w/superimposed daily phlebotomy.  She was subsequently noted to have re-opening of her enterovaginal fistula, prompting initiation of TPN, as well as tracheitis (i/s/o a rapid response) resulting in initiation of Vancomycin.  She is now medically stable and near completion of her antibiotic course.      She is due for her next session of plasmapheresis on 6/30.  Given her medical complexity, she will require direct admissions for planned plasmapheresis.  Neurology and Transfusion Medicine are aware and in accordance with this plan.      -- q4week plasmapheresis w/direct admissions to [facility]  -- c/w pressure support as tolerated but would ensure back on AC/VC overnight  -- c/w TPN  -- daily PT and OT if able  -- c/w Midodrine and Florinef and wean as able  -- c/w insulin for FS goal <160  -- Neuro following, appreciate assistance  -- Gyn following, appreciate assistance   -- Pall Care following, appreciate assistance  -- DVT PPx - Lovenox  -- Dispo - LTAC    Maya Christian  [phone] Patient was seen and examined with the resident team today.  I agree with Dr. Vincent's assessment and plan with the following exceptions/additions:     This is a 60yo woman with a PMH of polio c/b post-polio syndrome, breast cancer, endometrial cancer s/p explap, enterolysis, SHAMIR, BSO, pelvic lymphadenectomy, low anterior resection mobilization of splenic flexure and end colostomy (7/30/18), rectovaginal fistula, as well as, numerous admissions for urinary tract infections who was initially admitted for ESBL E. coli bacteremia c/b septic shock, now s/p a protracted hospital course.  Admission has been complicated by acute (now chronic) hypercapnic respiratory failure requiring multiple intubations 2/2 AIDP (s/p IVIG and plasmapheresis), now s/p trach and PEG ( 5/21).  Latter part of hospitalization developed another episode of sepsis 2/2 MRSA HAP/VAP and VRE UTI.  Last day of antibiotics on 5/30.  Patient was nearing discharge on 5/31, but her discharge was delayed 2/2 difficult outpatient coordination of treatment.  She underwent plasmapheresis x2 the week of 6/31 without complication but did require 1U PRBC given her chronic anemia w/superimposed daily phlebotomy.  She was subsequently noted to have re-opening of her enterovaginal fistula, prompting initiation of TPN, as well as tracheitis (i/s/o a rapid response) resulting in initiation of Vancomycin.  She is now medically stable and near completion of her antibiotic course.      She is due for her next session of plasmapheresis on 6/30.  Given her medical complexity, she will require direct admissions for planned plasmapheresis.  Neurology and Transfusion Medicine are aware and in accordance with this plan.  Each admission should be initiated with physician-to-physician sign-out (initiated by LTAC) no later than 24 hours prior to admission.     -- q4week plasmapheresis w/direct admissions to [facility]  -- c/w pressure support as tolerated but would ensure back on AC/VC overnight  -- c/w TPN  -- daily PT and OT if able  -- c/w Midodrine and Florinef and wean as able  -- c/w insulin for FS goal <160  -- Neuro following, appreciate assistance  -- Gyn following, appreciate assistance   -- Pall Care following, appreciate assistance  -- DVT PPx - Lovenox  -- Dispo - LTAC    Maya Christian  [phone]

## 2019-06-10 NOTE — PROGRESS NOTE ADULT - SUBJECTIVE AND OBJECTIVE BOX
Pt seen and examined at bedside. Westbrook draining clear yellow urine.  6mm ureteral stone incidentally discovered on CT at [facility] from 6/5.   Given pt is asymptomatic, no pain, no CVAT, Cr normal, afebrile, low concern for obstructing stone at this time.   Recommend renal/bladder ultrasound to evaluate stone.   Regarding suprapubic tube placement - SP tube will not decrease rate of infection however if would be chosen for comfort, recommend eval by IR for SPT placement given h/o prior abdominal surgery

## 2019-06-10 NOTE — CHART NOTE - NSCHARTNOTEFT_GEN_A_CORE
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  Pleural effusion  Muscle weakness: generalized  Malignant neoplasm: endometrium  History of total abdominal hysterectomy and bilateral salpingo-oophorectomy: with resection of endometrial mass, low anterior resection and pelvic lymphadenectomy      Current Nutrition Order: NPO +   TPN via central line:  258g Dex, 80g AA, 50g 20% Lipids to provide in total 1700 Kcal, 80 g protein, 3.36GIR ,1.5 g/kg ABW protein    PO Intake: Good (%) [   ]  Fair (50-75%) [   ] Poor (<25%) [   ]- NPO, bowel rest     GI Issues: no noted n/v/c, diarrhea noted     Pain: no pain noted     Skin Integrity: stage 2 pressure ulcer per record     Labs:   06-10    137  |  103  |  19  ----------------------------<  148<H>  4.4   |  23  |  0.52    Ca    9.3      10 Román 2019 06:51  Phos  3.7     06-10  Mg     1.9     06-10    TPro  5.9<L>  /  Alb  3.6  /  TBili  0.2  /  DBili  x   /  AST  9<L>  /  ALT  <5<L>  /  AlkPhos  60  06-10    CAPILLARY BLOOD GLUCOSE      POCT Blood Glucose.: 154 mg/dL (10 Román 2019 11:58)  POCT Blood Glucose.: 147 mg/dL (10 Román 2019 05:44)  POCT Blood Glucose.: 149 mg/dL (09 Jun 2019 23:45)  POCT Blood Glucose.: 143 mg/dL (09 Jun 2019 20:22)      Medications:  MEDICATIONS  (STANDING):  albumin human  5% IVPB 2750 milliLiter(s) IV Intermittent once  albumin human  5% IVPB 2750 milliLiter(s) IV Intermittent once  ALBUTerol/ipratropium for Nebulization 3 milliLiter(s) Nebulizer every 6 hours  calcium gluconate IVPB 1 Gram(s) IV Intermittent once  calcium gluconate IVPB 1 Gram(s) IV Intermittent once  chlorhexidine 0.12% Liquid 15 milliLiter(s) Oral Mucosa every 12 hours  chlorhexidine 2% Cloths 1 Application(s) Topical <User Schedule>  dextrose 5%. 1000 milliLiter(s) (50 mL/Hr) IV Continuous <Continuous>  dextrose 50% Injectable 25 Gram(s) IV Push once  enoxaparin Injectable 40 milliGRAM(s) SubCutaneous every 24 hours  fludroCORTISONE 0.1 milliGRAM(s) Oral every 24 hours  heparin 1000 units/mL 2000 Unit(s) 2000 Unit(s) IV Push once  heparin 1000units/mL 4000 Unit(s) 4000 Unit(s) IV Push once  insulin regular  human corrective regimen sliding scale   SubCutaneous every 6 hours  midodrine 10 milliGRAM(s) Oral every 8 hours  morphine  - Injectable 1 milliGRAM(s) IV Push once  morphine  - Injectable 2 milliGRAM(s) IV Push once  nystatin Cream 1 Application(s) Topical two times a day  Parenteral Nutrition - Adult 1 Each (63 mL/Hr) TPN Continuous <Continuous>  Parenteral Nutrition - Adult 1 Each (63 mL/Hr) TPN Continuous <Continuous>  piperacillin/tazobactam IVPB. 4.5 Gram(s) IV Intermittent every 6 hours  sodium chloride 3%  Inhalation 3 milliLiter(s) Inhalation every 4 hours  vancomycin  IVPB 500 milliGRAM(s) IV Intermittent every 12 hours    MEDICATIONS  (PRN):  acetaminophen    Suspension .. 1000 milliGRAM(s) Oral every 8 hours PRN Moderate Pain (4 - 6)  dextrose 40% Gel 15 Gram(s) Oral once PRN Blood Glucose LESS THAN 70 milliGRAM(s)/deciliter  glucagon  Injectable 1 milliGRAM(s) IntraMuscular once PRN Glucose LESS THAN 70 milligrams/deciliter  lidocaine 2% Gel 1 Application(s) Topical two times a day PRN pain around PEG site  sodium chloride 0.9% lock flush 10 milliLiter(s) IV Push every 1 hour PRN Pre/post blood products, medications, blood draw, and to maintain line patency      Weight:   57kg (4/29)   53.3kg    Weight Change: please continue to trend wts/ while on TPN     Nutrition Focused Physical Exam: Completed [   ]  Not Pertinent [ x  ]    Estimated energy needs:   ABW used for calculations as pt between % of IBW.   ABW 53.3kg, IBW 47kg, 113% IBW, ht 61", BMI 27.2   Nutrient needs based on [facility] standards of care for maintenance in older adults.   needs adjusted for age, PU stage 2, catabolic illness, vent  1599-1865kcal/day (30-35kcal/kg)  74-85g pro/day (1.4-1.6g/kg)  1599-1865ml fluid/day (30-35ml/kg)    Subjective:   53yo F w known stage IV endometrial cancer, likely with progression of disease, complex fistulae, chronic indwelling Westbrook admitted with fever and urosepsis. Pt intubated 2/2 acute hypoxic respiratory failure, and successfully extubated on 5/2. Pt transferred to [facility], and started on PO diet. Required reintubation on 5/8 2/2 CO2 narcosis likely d/t respiratory muscle insufficiency/ GBS/ AIDP. S/p IVIG and s/p LP. Pt extubated on 5/10, though reintubated on 5/13 for CO2 narcosis. Pt was initially declining trach/PEG and was going to be extubated, though she changed her mind and trach/PEG placed on 5/21. GOC discussion 5/23, pt made DNR/DNI however wanting to pursue nuero treatment. Moved to [facility] on 5/25 for further monitoring. Pt trached to vent was on CPAP attempting to be weaned off, trach collar trial pending as pt with episodes of apnea on vent. Able to tolerate CPAP intermittently however continues with apnea, vent placed back on ACVC mode, continues to have difficulty being weaned. Pt was stable for DC to LTAC, accepted to facilities however facilities do not do IVIG which pt requires, now pending additional options and facility for DC. Per nuero alternative option is HD cath placement with plasmapharesis [id number] for best results, per discussion in pal care rounds LTAC does not hold beds, pt will need to be readmitted to get treatment then DC back to facility same day, unclear of duration of treatment. EN stopped 6/3 d/t possible fistula. HD cath placed 6/5. Now per GYN recto vaginal fistula noted and confirmed 6/6, has had in past and resolved spontaneously.  Currently on bowel rest/ TPN while fistula resolves, continues with stool leakage from vagina. Tolerating TPN well at this time, lytes WNL K 4.4, Na 137, Glu 148, Phos 3.7. Will continue to follow per RD protocol.     Previous Nutrition Diagnosis:  Increased nutrient needs RT increased demand for kcal/pro AEB wt loss, pressure ulcer, catabolic illness     Active [  x ]  Resolved [   ]    If resolved, new PES:     Goal: Pt to consistently meet % of estimated needs via tolerated route     Recommendations:  1. Restart EN as feasible (Osmolite 1.2 Bola @ 44mL/hr x 24hrs plus 1 ProStat via PEG. Provides: 1056mL TV, 1367kcal, 74g protein, 866mL free H2O, 106% RDI, 1.55g/kg IBW protein, 22.5 non pro kcal)   2. C/w TPN. Recommend TPN via central line:  258g Dex, 80g AA, 50g 20% Lipids to provide in total 1700 Kcal, 80 g protein, 3.36GIR ,1.5 g/kg ABW protein  Recommend checking TG before starting TPN and then check TG weekly. Check Mg, Phos, K daily and POC BG Q6hrs. Trend daily weights. Fluids and lytes per MD discretion.   3. Monitor and replete lytes prn   4. Trend wts     Paged team to discuss     Education: n/a lethargic     Risk Level: High [ x  ] Moderate [   ] Low [   ]

## 2019-06-10 NOTE — PROGRESS NOTE ADULT - SUBJECTIVE AND OBJECTIVE BOX
OVERNIGHT EVENTS: KESHAWN    SUBJECTIVE: Patient feels slightly down this morning, but overall is stable from prior. Still has secretions which have slightly improved from prior. Feels upper extremities are strong.    Vital Signs Last 12 Hrs  T(F): 98.6 (06-10-19 @ 09:13), Max: 98.6 (06-10-19 @ 09:13)  HR: 72 (06-10-19 @ 09:15) (69 - 72)  BP: 122/82 (06-10-19 @ 09:13) (122/82 - 140/73)  BP(mean): --  RR: 18 (06-10-19 @ 09:15) (18 - 20)  SpO2: 98% (06-10-19 @ 09:15) (97% - 100%)  I&O's Summary    09 Jun 2019 07:01  -  10 Román 2019 07:00  --------------------------------------------------------  IN: 966 mL / OUT: 2375 mL / NET: -1409 mL    10 Román 2019 07:01  -  10 Román 2019 14:29  --------------------------------------------------------  IN: 441 mL / OUT: 900 mL / NET: -459 mL        PHYSICAL EXAM:  Constitutional: NAD, comfortable in bed.  HEENT: NC/AT, PERRLA, EOMI, no conjunctival pallor or scleral icterus, MMM  Neck: Supple, no JVD, tracheostomy in place without secretions surrounding  Respiratory: on cpap, lungs w diffuse coarse rhonchi  Cardiovascular: RRR, normal S1 and S2, no m/r/g.   Gastrointestinal: +BS, soft NTND, no guarding or rebound tenderness, no palpable masses, PEG in place - no signs of infection, colostomy in place w liquid green stool  : joseph in place  Extremities: wwp; no cyanosis, clubbing or edema.   Vascular: Pulses equal and strong throughout, R PICC line, R chest chemoport - accessed, L subclavian HD cath  Neurological: AAOx3, no CN deficits, strength 5/5 in UEs and 1/5 in LEs, sensation intact throughout.         LABS:                        7.0    9.11  )-----------( 288      ( 10 Román 2019 06:51 )             23.1     06-10    137  |  103  |  19  ----------------------------<  148<H>  4.4   |  23  |  0.52    Ca    9.3      10 Román 2019 06:51  Phos  3.7     06-10  Mg     1.9     06-10    TPro  5.9<L>  /  Alb  3.6  /  TBili  0.2  /  DBili  x   /  AST  9<L>  /  ALT  <5<L>  /  AlkPhos  60  06-10    PT/INR - ( 10 Román 2019 06:51 )   PT: 13.2 sec;   INR: 1.16          PTT - ( 10 Román 2019 06:51 )  PTT:29.9 sec      RADIOLOGY & ADDITIONAL TESTS:    MEDICATIONS  (STANDING):  albumin human  5% IVPB 2750 milliLiter(s) IV Intermittent once  albumin human  5% IVPB 2750 milliLiter(s) IV Intermittent once  ALBUTerol/ipratropium for Nebulization 3 milliLiter(s) Nebulizer every 6 hours  calcium gluconate IVPB 1 Gram(s) IV Intermittent once  calcium gluconate IVPB 1 Gram(s) IV Intermittent once  chlorhexidine 0.12% Liquid 15 milliLiter(s) Oral Mucosa every 12 hours  chlorhexidine 2% Cloths 1 Application(s) Topical <User Schedule>  dextrose 5%. 1000 milliLiter(s) (50 mL/Hr) IV Continuous <Continuous>  dextrose 50% Injectable 25 Gram(s) IV Push once  enoxaparin Injectable 40 milliGRAM(s) SubCutaneous every 24 hours  fludroCORTISONE 0.1 milliGRAM(s) Oral every 24 hours  heparin 1000 units/mL 2000 Unit(s) 2000 Unit(s) IV Push once  heparin 1000units/mL 4000 Unit(s) 4000 Unit(s) IV Push once  insulin regular  human corrective regimen sliding scale   SubCutaneous every 6 hours  midodrine 10 milliGRAM(s) Oral every 8 hours  morphine  - Injectable 1 milliGRAM(s) IV Push once  morphine  - Injectable 2 milliGRAM(s) IV Push once  nystatin Cream 1 Application(s) Topical two times a day  Parenteral Nutrition - Adult 1 Each (63 mL/Hr) TPN Continuous <Continuous>  Parenteral Nutrition - Adult 1 Each (63 mL/Hr) TPN Continuous <Continuous>  piperacillin/tazobactam IVPB. 4.5 Gram(s) IV Intermittent every 6 hours  sodium chloride 3%  Inhalation 3 milliLiter(s) Inhalation every 4 hours  vancomycin  IVPB 500 milliGRAM(s) IV Intermittent every 12 hours    MEDICATIONS  (PRN):  acetaminophen    Suspension .. 1000 milliGRAM(s) Oral every 8 hours PRN Moderate Pain (4 - 6)  dextrose 40% Gel 15 Gram(s) Oral once PRN Blood Glucose LESS THAN 70 milliGRAM(s)/deciliter  glucagon  Injectable 1 milliGRAM(s) IntraMuscular once PRN Glucose LESS THAN 70 milligrams/deciliter  lidocaine 2% Gel 1 Application(s) Topical two times a day PRN pain around PEG site  sodium chloride 0.9% lock flush 10 milliLiter(s) IV Push every 1 hour PRN Pre/post blood products, medications, blood draw, and to maintain line patency

## 2019-06-10 NOTE — PROGRESS NOTE ADULT - PROBLEM SELECTOR PLAN 9
-IVF: none  -Monitor, Replete to K>4 and Mg>2  -Diet: continue TPN given enterovaginal fistula  -DVT: Lovenox, SCDs

## 2019-06-10 NOTE — PROGRESS NOTE ADULT - PROBLEM SELECTOR PLAN 7
Stage IV endometrial adenocarcinoma; Per GYN-ONC w/ interval increase in tumor burden. Pt was treated with Anastrazole and initiated 3 weeks of megace 80mg BID followed by 3 weeks of tamoxifen. Pt was on tamoxifen and developed a left cephalic v. thrombus. Tamoxifen was dc'd on 5/17   - Per GYN, no anticipation of continuing chemotherapy    #Anxiety   - 2/2 ongoing medical issues and prognosis  - remeron 15mg qhs prn for insomnia

## 2019-06-11 LAB
CULTURE RESULTS: SIGNIFICANT CHANGE UP
HCT VFR BLD CALC: 23.9 % — LOW (ref 34.5–45)
HGB BLD-MCNC: 7.3 G/DL — LOW (ref 11.5–15.5)
MCHC RBC-ENTMCNC: 28.7 PG — SIGNIFICANT CHANGE UP (ref 27–34)
MCHC RBC-ENTMCNC: 30.5 GM/DL — LOW (ref 32–36)
MCV RBC AUTO: 94.1 FL — SIGNIFICANT CHANGE UP (ref 80–100)
NRBC # BLD: 0 /100 WBCS — SIGNIFICANT CHANGE UP (ref 0–0)
PLATELET # BLD AUTO: 325 K/UL — SIGNIFICANT CHANGE UP (ref 150–400)
RBC # BLD: 2.54 M/UL — LOW (ref 3.8–5.2)
RBC # FLD: 15.7 % — HIGH (ref 10.3–14.5)
SPECIMEN SOURCE: SIGNIFICANT CHANGE UP
WBC # BLD: 7.52 K/UL — SIGNIFICANT CHANGE UP (ref 3.8–10.5)
WBC # FLD AUTO: 7.52 K/UL — SIGNIFICANT CHANGE UP (ref 3.8–10.5)

## 2019-06-11 PROCEDURE — 99233 SBSQ HOSP IP/OBS HIGH 50: CPT

## 2019-06-11 PROCEDURE — 93010 ELECTROCARDIOGRAM REPORT: CPT

## 2019-06-11 PROCEDURE — 99232 SBSQ HOSP IP/OBS MODERATE 35: CPT | Mod: GC

## 2019-06-11 PROCEDURE — 99232 SBSQ HOSP IP/OBS MODERATE 35: CPT

## 2019-06-11 RX ORDER — I.V. FAT EMULSION 20 G/100ML
0.94 EMULSION INTRAVENOUS
Qty: 50 | Refills: 0 | Status: DISCONTINUED | OUTPATIENT
Start: 2019-06-11 | End: 2019-06-11

## 2019-06-11 RX ORDER — ELECTROLYTE SOLUTION,INJ
1 VIAL (ML) INTRAVENOUS
Refills: 0 | Status: DISCONTINUED | OUTPATIENT
Start: 2019-06-11 | End: 2019-06-11

## 2019-06-11 RX ADMIN — SODIUM CHLORIDE 3 MILLILITER(S): 9 INJECTION INTRAMUSCULAR; INTRAVENOUS; SUBCUTANEOUS at 06:43

## 2019-06-11 RX ADMIN — CHLORHEXIDINE GLUCONATE 15 MILLILITER(S): 213 SOLUTION TOPICAL at 09:37

## 2019-06-11 RX ADMIN — SODIUM CHLORIDE 3 MILLILITER(S): 9 INJECTION INTRAMUSCULAR; INTRAVENOUS; SUBCUTANEOUS at 01:27

## 2019-06-11 RX ADMIN — SODIUM CHLORIDE 3 MILLILITER(S): 9 INJECTION INTRAMUSCULAR; INTRAVENOUS; SUBCUTANEOUS at 09:37

## 2019-06-11 RX ADMIN — PIPERACILLIN AND TAZOBACTAM 200 GRAM(S): 4; .5 INJECTION, POWDER, LYOPHILIZED, FOR SOLUTION INTRAVENOUS at 06:42

## 2019-06-11 RX ADMIN — Medication 100 MILLIGRAM(S): at 12:23

## 2019-06-11 RX ADMIN — Medication 1000 MILLIGRAM(S): at 23:15

## 2019-06-11 RX ADMIN — NYSTATIN CREAM 1 APPLICATION(S): 100000 CREAM TOPICAL at 18:12

## 2019-06-11 RX ADMIN — SODIUM CHLORIDE 3 MILLILITER(S): 9 INJECTION INTRAMUSCULAR; INTRAVENOUS; SUBCUTANEOUS at 22:28

## 2019-06-11 RX ADMIN — ENOXAPARIN SODIUM 40 MILLIGRAM(S): 100 INJECTION SUBCUTANEOUS at 18:13

## 2019-06-11 RX ADMIN — I.V. FAT EMULSION 20.88 GM/KG/DAY: 20 EMULSION INTRAVENOUS at 22:22

## 2019-06-11 RX ADMIN — MIDODRINE HYDROCHLORIDE 10 MILLIGRAM(S): 2.5 TABLET ORAL at 09:38

## 2019-06-11 RX ADMIN — Medication 3 MILLILITER(S): at 18:11

## 2019-06-11 RX ADMIN — MIDODRINE HYDROCHLORIDE 10 MILLIGRAM(S): 2.5 TABLET ORAL at 01:27

## 2019-06-11 RX ADMIN — CHLORHEXIDINE GLUCONATE 1 APPLICATION(S): 213 SOLUTION TOPICAL at 06:42

## 2019-06-11 RX ADMIN — Medication 100 MILLIGRAM(S): at 00:50

## 2019-06-11 RX ADMIN — Medication 3 MILLILITER(S): at 12:23

## 2019-06-11 RX ADMIN — NYSTATIN CREAM 1 APPLICATION(S): 100000 CREAM TOPICAL at 06:43

## 2019-06-11 RX ADMIN — INSULIN HUMAN 2: 100 INJECTION, SOLUTION SUBCUTANEOUS at 12:27

## 2019-06-11 RX ADMIN — MIDODRINE HYDROCHLORIDE 10 MILLIGRAM(S): 2.5 TABLET ORAL at 18:11

## 2019-06-11 RX ADMIN — SODIUM CHLORIDE 3 MILLILITER(S): 9 INJECTION INTRAMUSCULAR; INTRAVENOUS; SUBCUTANEOUS at 18:12

## 2019-06-11 RX ADMIN — FLUDROCORTISONE ACETATE 0.1 MILLIGRAM(S): 0.1 TABLET ORAL at 06:43

## 2019-06-11 RX ADMIN — PIPERACILLIN AND TAZOBACTAM 200 GRAM(S): 4; .5 INJECTION, POWDER, LYOPHILIZED, FOR SOLUTION INTRAVENOUS at 12:23

## 2019-06-11 RX ADMIN — CHLORHEXIDINE GLUCONATE 15 MILLILITER(S): 213 SOLUTION TOPICAL at 22:28

## 2019-06-11 RX ADMIN — SODIUM CHLORIDE 3 MILLILITER(S): 9 INJECTION INTRAMUSCULAR; INTRAVENOUS; SUBCUTANEOUS at 14:14

## 2019-06-11 RX ADMIN — Medication 1000 MILLIGRAM(S): at 22:28

## 2019-06-11 RX ADMIN — Medication 3 MILLILITER(S): at 06:42

## 2019-06-11 RX ADMIN — Medication 1 EACH: at 19:27

## 2019-06-11 NOTE — PROGRESS NOTE ADULT - ATTENDING COMMENTS
Patient was seen and examined with the resident team today.  I agree with Dr. Vincent's assessment and plan with the following exceptions/additions:     This is a 58yo woman with a PMH of polio c/b post-polio syndrome, breast cancer, endometrial cancer s/p explap, enterolysis, SHAMIR, BSO, pelvic lymphadenectomy, low anterior resection mobilization of splenic flexure and end colostomy (7/30/18), rectovaginal fistula, as well as, numerous admissions for urinary tract infections who was initially admitted for ESBL E. coli bacteremia c/b septic shock, now s/p a protracted hospital course.  Admission has been complicated by acute (now chronic) hypercapnic respiratory failure requiring multiple intubations 2/2 AIDP (s/p IVIG and plasmapheresis), now s/p trach and PEG ( 5/21).  Latter part of hospitalization developed another episode of sepsis 2/2 MRSA HAP/VAP and VRE UTI.  Last day of antibiotics on 5/30.  Patient was nearing discharge on 5/31, but her discharge was delayed 2/2 difficult outpatient coordination of treatment.  She underwent plasmapheresis x2 the week of 6/31 without complication but did require 1U PRBC given her chronic anemia w/superimposed daily phlebotomy.  She was subsequently noted to have re-opening of her enterovaginal fistula, prompting initiation of TPN, as well as tracheitis (i/s/o a rapid response) resulting in initiation of Vancomycin.  She is now medically stable with antibiotics discontinued today.      She is due for her next session of plasmapheresis on 6/30.  Given her medical complexity, she will require direct admissions for planned plasmapheresis.  Neurology and Transfusion Medicine are aware and in accordance with this plan.  Each admission should be initiated with physician-to-physician sign-out (initiated by LTAC) no later than 24 hours prior to admission.     -- q4week plasmapheresis w/direct admissions to [facility]  -- c/w pressure support as tolerated but would ensure back on AC/VC overnight  -- c/w TPN  -- daily PT and OT if able  -- c/w Midodrine and Florinef and wean as able  -- c/w insulin for FS goal <160  -- Neuro following, appreciate assistance  -- Gyn following, appreciate assistance   -- Pall Care following, appreciate assistance  -- DVT PPx - Lovenox  -- Dispo - LTAC    Maya Christian  [phone]

## 2019-06-11 NOTE — PROGRESS NOTE ADULT - SUBJECTIVE AND OBJECTIVE BOX
O/N Events:  HDS/ remained on AC-VC  Subjective:  denies any pain, cough and sputum production improving, UE with good forces, however LE still week     VITALS  Vital Signs Last 24 Hrs  T(C): 36.9 (11 Jun 2019 08:32), Max: 37.4 (10 Román 2019 16:46)  T(F): 98.4 (11 Jun 2019 08:32), Max: 99.3 (10 Roámn 2019 16:46)  HR: 67 (11 Jun 2019 12:38) (66 - 78)  BP: 129/82 (11 Jun 2019 08:32) (114/76 - 139/75)  BP(mean): --  RR: 20 (11 Jun 2019 12:38) (12 - 24)  SpO2: 99% (11 Jun 2019 12:38) (99% - 100%)      PHYSICAL EXAM  Constitutional: NAD, alert, awake and communicative   Eyes: anicteric sclera  Cardiovascular: +S1/S2, RRR; no M/R/G  Neurologic:  - Mental Status:  AAOx3  - Cranial Nerves II-XII:    PERRLA, EOMI, no nystagmus, face is symmetric with normal eye closure and smile  head turning and shoulder shrug are intact bilaterally  tongue protrudes in the midline  - Motor:  strength is 4/5 in deltoids b/l, 4+ otherwise in UE; 1-2 in lower extremity, unchanged  - Sensory:  intact to light touch throughout  - Coordination:  finger-nose-finger intact b/l, rapid alternating hand movements intact      LABS                        7.3    7.52  )-----------( 325      ( 11 Jun 2019 06:25 )             23.9     06-10    137  |  103  |  19  ----------------------------<  148<H>  4.4   |  23  |  0.52    Ca    9.3      10 Román 2019 06:51  Phos  3.7     06-10  Mg     1.9     06-10    TPro  5.9<L>  /  Alb  3.6  /  TBili  0.2  /  DBili  x   /  AST  9<L>  /  ALT  <5<L>  /  AlkPhos  60  06-10    LIVER FUNCTIONS - ( 10 Román 2019 06:51 )  Alb: 3.6 g/dL / Pro: 5.9 g/dL / ALK PHOS: 60 U/L / ALT: <5 U/L / AST: 9 U/L / GGT: x           PT/INR - ( 10 Román 2019 06:51 )   PT: 13.2 sec;   INR: 1.16          PTT - ( 10 Román 2019 06:51 )  PTT:29.9 sec    IMAGING/EKG/ETC  prior imaging reviewed, no new imaing O/N Events:  HDS/ remained on AC-VC  Subjective:  denies any pain, cough and sputum production improving, UE with good force, however LE still week     VITALS  Vital Signs Last 24 Hrs  T(C): 36.9 (11 Jun 2019 08:32), Max: 37.4 (10 Román 2019 16:46)  T(F): 98.4 (11 Jun 2019 08:32), Max: 99.3 (10 Román 2019 16:46)  HR: 67 (11 Jun 2019 12:38) (66 - 78)  BP: 129/82 (11 Jun 2019 08:32) (114/76 - 139/75)  BP(mean): --  RR: 20 (11 Jun 2019 12:38) (12 - 24)  SpO2: 99% (11 Jun 2019 12:38) (99% - 100%)      PHYSICAL EXAM  Constitutional: NAD, alert, awake and communicative   Eyes: anicteric sclera  Cardiovascular: +S1/S2, RRR; no M/R/G  Neurologic:  - Mental Status:  AAOx3  - Cranial Nerves II-XII:    PERRLA, EOMI, no nystagmus, face is symmetric with normal eye closure and smile  head turning and shoulder shrug are intact bilaterally  tongue protrudes in the midline  - Motor:  strength is 4/5 in deltoids b/l, 4+ otherwise in UE; 1-2 in lower extremity, unchanged  - Sensory:  intact to light touch throughout  - Coordination:  finger-nose-finger intact b/l, rapid alternating hand movements intact  Reflexes absent throughout      LABS                        7.3    7.52  )-----------( 325      ( 11 Jun 2019 06:25 )             23.9     06-10    137  |  103  |  19  ----------------------------<  148<H>  4.4   |  23  |  0.52    Ca    9.3      10 Román 2019 06:51  Phos  3.7     06-10  Mg     1.9     06-10    TPro  5.9<L>  /  Alb  3.6  /  TBili  0.2  /  DBili  x   /  AST  9<L>  /  ALT  <5<L>  /  AlkPhos  60  06-10    LIVER FUNCTIONS - ( 10 Román 2019 06:51 )  Alb: 3.6 g/dL / Pro: 5.9 g/dL / ALK PHOS: 60 U/L / ALT: <5 U/L / AST: 9 U/L / GGT: x           PT/INR - ( 10 Román 2019 06:51 )   PT: 13.2 sec;   INR: 1.16          PTT - ( 10 Román 2019 06:51 )  PTT:29.9 sec    IMAGING/EKG/ETC  prior imaging reviewed, no new imaing

## 2019-06-11 NOTE — PROGRESS NOTE ADULT - PROBLEM SELECTOR PLAN 8
Hx of urinary retention/overflow incontinence   - Joseph changed on 5/17   - ct abdomen shows mild L hydro w obstructing stone on 6/6  - per urology, continue w joseph  - ordered kidney and bladder US to eval for hydronephrosis    #PEG placement  - Pt s/p PEG placement 5/21   - holding Osmolite 1.2     #rectovaginal fistula  - per GYN onc, hx of recto vaginal fistula that resolved spontaneously, however now has recurred  - f/u CT abd and pelvis w oral and IV contrast - confirmed entero vaginal fistula on CT pelvis  - appreciate gyn recs  -NPO  -TPN

## 2019-06-11 NOTE — PROGRESS NOTE ADULT - SUBJECTIVE AND OBJECTIVE BOX
OVERNIGHT EVENTS: KESHAWN    SUBJECTIVE: Patient reports that her secretions have improved. Her tracheostomy is a little loose though. Otherwise she has no acute complaints.    Vital Signs Last 12 Hrs  T(F): 98.4 (06-11-19 @ 08:32), Max: 98.5 (06-11-19 @ 05:50)  HR: 67 (06-11-19 @ 12:38) (66 - 70)  BP: 129/82 (06-11-19 @ 08:32) (129/82 - 130/83)  BP(mean): --  RR: 20 (06-11-19 @ 12:38) (12 - 20)  SpO2: 99% (06-11-19 @ 12:38) (99% - 100%)  I&O's Summary    10 Román 2019 07:01  -  11 Jun 2019 07:00  --------------------------------------------------------  IN: 1560 mL / OUT: 2950 mL / NET: -1390 mL    11 Jun 2019 07:01  -  11 Jun 2019 13:54  --------------------------------------------------------  IN: 0 mL / OUT: 700 mL / NET: -700 mL        PHYSICAL EXAM:  Constitutional: NAD, comfortable in bed.  HEENT: NC/AT, PERRLA, EOMI, no conjunctival pallor or scleral icterus, MMM  Neck: Supple, no JVD, tracheostomy in place without secretions surrounding 2 cm protruded  Respiratory: on cpap, lungs w diffuse coarse rhonchi  Cardiovascular: RRR, normal S1 and S2, no m/r/g.   Gastrointestinal: +BS, soft NTND, no guarding or rebound tenderness, no palpable masses, PEG in place - no signs of infection, colostomy in place w liquid stool  : joseph in place  Extremities: wwp; no cyanosis, clubbing or edema.   Vascular: Pulses equal and strong throughout, R PICC line, R chest chemoport - accessed, L subclavian HD cath  Neurological: AAOx3, no CN deficits, strength 5/5 in UEs and 1/5 in LEs, sensation intact throughout.         LABS:                        7.3    7.52  )-----------( 325      ( 11 Jun 2019 06:25 )             23.9     06-10    137  |  103  |  19  ----------------------------<  148<H>  4.4   |  23  |  0.52    Ca    9.3      10 Román 2019 06:51  Phos  3.7     06-10  Mg     1.9     06-10    TPro  5.9<L>  /  Alb  3.6  /  TBili  0.2  /  DBili  x   /  AST  9<L>  /  ALT  <5<L>  /  AlkPhos  60  06-10    PT/INR - ( 10 Román 2019 06:51 )   PT: 13.2 sec;   INR: 1.16          PTT - ( 10 Román 2019 06:51 )  PTT:29.9 sec      RADIOLOGY & ADDITIONAL TESTS:    MEDICATIONS  (STANDING):  albumin human  5% IVPB 2750 milliLiter(s) IV Intermittent once  albumin human  5% IVPB 2750 milliLiter(s) IV Intermittent once  ALBUTerol/ipratropium for Nebulization 3 milliLiter(s) Nebulizer every 6 hours  calcium gluconate IVPB 1 Gram(s) IV Intermittent once  calcium gluconate IVPB 1 Gram(s) IV Intermittent once  chlorhexidine 0.12% Liquid 15 milliLiter(s) Oral Mucosa every 12 hours  chlorhexidine 2% Cloths 1 Application(s) Topical <User Schedule>  dextrose 5%. 1000 milliLiter(s) (50 mL/Hr) IV Continuous <Continuous>  dextrose 50% Injectable 25 Gram(s) IV Push once  enoxaparin Injectable 40 milliGRAM(s) SubCutaneous every 24 hours  fat emulsion (Plant Based) 20% Infusion 0.94 Gm/kG/Day (20.876 mL/Hr) IV Continuous <Continuous>  fludroCORTISONE 0.1 milliGRAM(s) Oral every 24 hours  heparin 1000 units/mL 2000 Unit(s) 2000 Unit(s) IV Push once  heparin 1000units/mL 4000 Unit(s) 4000 Unit(s) IV Push once  insulin regular  human corrective regimen sliding scale   SubCutaneous every 6 hours  midodrine 10 milliGRAM(s) Oral every 8 hours  morphine  - Injectable 1 milliGRAM(s) IV Push once  morphine  - Injectable 2 milliGRAM(s) IV Push once  nystatin Cream 1 Application(s) Topical two times a day  Parenteral Nutrition - Adult 1 Each (63 mL/Hr) TPN Continuous <Continuous>  Parenteral Nutrition - Adult 1 Each (63 mL/Hr) TPN Continuous <Continuous>  piperacillin/tazobactam IVPB. 4.5 Gram(s) IV Intermittent every 6 hours  sodium chloride 3%  Inhalation 3 milliLiter(s) Inhalation every 4 hours  vancomycin  IVPB 500 milliGRAM(s) IV Intermittent every 12 hours    MEDICATIONS  (PRN):  acetaminophen    Suspension .. 1000 milliGRAM(s) Oral every 8 hours PRN Moderate Pain (4 - 6)  dextrose 40% Gel 15 Gram(s) Oral once PRN Blood Glucose LESS THAN 70 milliGRAM(s)/deciliter  glucagon  Injectable 1 milliGRAM(s) IntraMuscular once PRN Glucose LESS THAN 70 milligrams/deciliter  lidocaine 2% Gel 1 Application(s) Topical two times a day PRN pain around PEG site  sodium chloride 0.9% lock flush 10 milliLiter(s) IV Push every 1 hour PRN Pre/post blood products, medications, blood draw, and to maintain line patency

## 2019-06-11 NOTE — PROGRESS NOTE ADULT - PROBLEM SELECTOR PLAN 4
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   - on TPN  - patient distressed by her increasing dependence, long term planning yet to be defined as currently she is planned to receive plasmapheresis Q4 weeks for 3 months period if deemed effective by neuro

## 2019-06-11 NOTE — PROGRESS NOTE ADULT - PROBLEM SELECTOR PLAN 1
trach to vent.   -plan for plasmapheresin several weeks  -Currently without significant respiratory complaints    -No recommendation for scopolamine or Robinul due to neuro status and anticholinergic effect.

## 2019-06-11 NOTE — PROGRESS NOTE ADULT - PROBLEM SELECTOR PLAN 9
had a rapid response yesterday. Weaning failed before and now she is on vent. Wishes to continue aggressive measures

## 2019-06-11 NOTE — PROGRESS NOTE ADULT - SUBJECTIVE AND OBJECTIVE BOX
TIM MCDANIEL             [id number]    CC:  weakness, GOC    HPI:  60 yo G0 w/ known stage IV endometrial carcinoma s/p staging surgery 7/2018 and 1 cycle of chemotherapy in 2/2019 followed by multiple admissions for management of symptomatic enterovaginal fistula, complex fistula associated urinary tract infection, bacteremia presents on referral from [facility] after fever developing fever.  Found to have severe sepsis, required intubation and treatment of UTI,.  Initially improved but then declined neurologically and required intubation for hypercapnic respiratory failure.  Was extubated and did well for several days. Required reintubation for profound weakness and again, hypercapneic respiratory failure.  Treated with plasmapheresis with improvement, but still unable to be safely weaned.  patient chose to be trached and pegged because she felt she could get better.    SUBJECTIVE:  Patient seen at bedside, she is awake and alert.  . Tolerating TPN which was restarted because of recurrent enterovaginal fistula.  Has not had trouble with mucous plugging.  PT was able to get her into a chair yesterday.  Still with weakness in shoulder girdle but overall she feels good.      ROS:  DYSPNEA: No  NAUS/VOM: No  SECRETIONS: Yes, increased secretions	  AGITATION: No  Pain (Y/N): No  -Provocation/Palliation:  -Quality/Quantity:  -Radiating:  -Severity:  -Timing/Frequency:  -Impact on ADLs:    Remainder of ROS unremarkable      PEx:  Vital Signs Last 24 Hrs  T(C): 36.9 (11 Jun 2019 08:32), Max: 37.4 (10 Román 2019 16:46)  T(F): 98.4 (11 Jun 2019 08:32), Max: 99.3 (10 Román 2019 16:46)  HR: 67 (11 Jun 2019 12:38) (66 - 78)  BP: 129/82 (11 Jun 2019 08:32) (114/76 - 139/75)  BP(mean): --  RR: 20 (11 Jun 2019 12:38) (12 - 24)  SpO2: 99% (11 Jun 2019 12:38) (99% - 100%)    General: frail female, chronically ill appearing  comfortable in bed ; appearing older than stated age, pale, engaged in conversation and writing on a pad without difficulty. Watching TV, pleasant   HEENT: moderate alopecia; conjunctival pallor;  smiles often.  Neck: supple, but appears stronger with more head control.  Able to shake head vigorously "yes" and "no" trach in place  CVS: S1/S2, RRR  Resp: on ventilator  GI: soft, mild tenderness over LLQ, no rebound,  PEG  Musc: hypotrophic muscularity and bulk, particularly of UE/hands, LE; profound kyphoscoliosis muscle wasting of left trapezius and latissimus dorsi  Neuro: awake and alert; shrugs shoulders moderate neck strength, squeezes my hand, is able to move all extremities, can move UEs more than LEs, not able to life LEs  Psych: aware of care management. states she was on TPN before, and knows why she should be on it again.   Skin: intact	     ALLERGIES: IVIG PRODUCT IS PRIGIVEN OR GAMMUNEX (Unknown)  No Known Allergies    OPIATE NAÏVE (Y/N): Yes on presentation, had received opiates earlier in admission    MEDICATIONS  (STANDING): reviewed  albumin human  5% IVPB 2750 milliLiter(s) IV Intermittent once  albumin human  5% IVPB 2750 milliLiter(s) IV Intermittent once  ALBUTerol/ipratropium for Nebulization 3 milliLiter(s) Nebulizer every 6 hours  calcium gluconate IVPB 1 Gram(s) IV Intermittent once  calcium gluconate IVPB 1 Gram(s) IV Intermittent once  chlorhexidine 0.12% Liquid 15 milliLiter(s) Oral Mucosa every 12 hours  chlorhexidine 2% Cloths 1 Application(s) Topical <User Schedule>  dextrose 5%. 1000 milliLiter(s) (50 mL/Hr) IV Continuous <Continuous>  dextrose 50% Injectable 25 Gram(s) IV Push once  enoxaparin Injectable 40 milliGRAM(s) SubCutaneous every 24 hours  fat emulsion (Plant Based) 20% Infusion 0.94 Gm/kG/Day (20.876 mL/Hr) IV Continuous <Continuous>  fludroCORTISONE 0.1 milliGRAM(s) Oral every 24 hours  heparin 1000 units/mL 2000 Unit(s) 2000 Unit(s) IV Push once  heparin 1000units/mL 4000 Unit(s) 4000 Unit(s) IV Push once  insulin regular  human corrective regimen sliding scale   SubCutaneous every 6 hours  midodrine 10 milliGRAM(s) Oral every 8 hours  morphine  - Injectable 1 milliGRAM(s) IV Push once  morphine  - Injectable 2 milliGRAM(s) IV Push once  nystatin Cream 1 Application(s) Topical two times a day  Parenteral Nutrition - Adult 1 Each (63 mL/Hr) TPN Continuous <Continuous>  Parenteral Nutrition - Adult 1 Each (63 mL/Hr) TPN Continuous <Continuous>  sodium chloride 3%  Inhalation 3 milliLiter(s) Inhalation every 4 hours    MEDICATIONS  (PRN):  acetaminophen    Suspension .. 1000 milliGRAM(s) Oral every 8 hours PRN Moderate Pain (4 - 6)  dextrose 40% Gel 15 Gram(s) Oral once PRN Blood Glucose LESS THAN 70 milliGRAM(s)/deciliter  glucagon  Injectable 1 milliGRAM(s) IntraMuscular once PRN Glucose LESS THAN 70 milligrams/deciliter  lidocaine 2% Gel 1 Application(s) Topical two times a day PRN pain around PEG site  sodium chloride 0.9% lock flush 10 milliLiter(s) IV Push every 1 hour PRN Pre/post blood products, medications, blood draw, and to maintain line patency                  LABS:                                                          7.3    7.52  )-----------( 325      ( 11 Jun 2019 06:25 )             23.9   06-10    137  |  103  |  19  ----------------------------<  148<H>  4.4   |  23  |  0.52    Ca    9.3      10 Román 2019 06:51  Phos  3.7     06-10  Mg     1.9     06-10    TPro  5.9<L>  /  Alb  3.6  /  TBili  0.2  /  DBili  x   /  AST  9<L>  /  ALT  <5<L>  /  AlkPhos  60  06-10                     IMAGING: REVIEWED    CT Abd/Plv- CTA chest (June 5th):   IMPRESSION:    No pulmonary embolus.    Mild left hydroureteronephrosis due to distal obstruction from either 2.5   cm left pelvic sidewall implant and/or 6.4mm stone at the left   UVJ/bladder.    Enterovaginal fistula.    Pelvic lymphadenopathy mildly increased from prior exam.     Small amount of free fluid around the liver and spleen.   < from: Xray Chest 1 View- PORTABLE-Routine (05.24.19 @ 04:45) >  EXAM:  XR CHEST PORTABLE ROUTINE 1V                          PROCEDURE DATE:  05/24/2019      INTERPRETATION:    CXR 6/5/2019: 6/5/2019. Tracheostomy tube and bilateral central venous lines are   unchanged. Stable heart size. There is again increased markings in the   left lung which could be due to patient's rotation. Right lung is clear.   Cannot exclude a small left pleural fusion. No right pleural effusion. No   pneumothorax.    ADVANCED DIRECTIVES:     see note above, DNR, trach, PEG    DECISION MAKER: patient/self  LEGAL SURROGATE: Esha Avila (sister) [phone]    PSYCHOSOCIAL-SPIRITUAL ASSESSMENT:       Reviewed       Care plan as below	    GOALS OF CARE DISCUSSION       Palliative care info/counseling provided previously.  Awaiting arrival of Esha roblero	           Advanced Directives addressed please see Advance Care Planning Note	           See previous Palliative Medicine Notes       Documentation of GOC: Agrees with efforts to treat,     on trach and TPN for nutrition.  Remains hopeful in a difficult situation.  Wants to continue plasmapheresis   	      AGENCY CHOICE DISCUSSED:     LTAC for weaning    REFERRALS	        Palliative Med        Unit SW/Case Mgmt        - declined       Speech/Swallow - failed dysphagia; has NGT currently       Patient/Family Support       Massage Therapy       Music Therapy       Hospice - not applicable at present, still hopeful to be treated       Nutrition       PT/OT

## 2019-06-11 NOTE — PROGRESS NOTE ADULT - ATTENDING COMMENTS
Pt seen by me on 6/12  d/c planning to LTAC  please emphasize importance of attempting to wean pt from vent to LTAC facility  plan for readmission 7/1 for PLEX

## 2019-06-11 NOTE — PROGRESS NOTE ADULT - PROBLEM SELECTOR PLAN 1
2/2 to demyelinating polyneuropathy (AIDP), patient has required 3 intubations this admission, now s/p trach (5/21) and s/p 4 doses of IVIG finished 5/11 and 5 rounds of plasmapheresis (finished 5/21), additional 2 rounds of plasmapheresis finished on 6/6  - C/w mechanical ventilation at night and cpap during day. wean as tolerated  - C/w frequent suctioning (q4h)  - pulm consult for increased secretions and possible mucus plugging - duonebs q6h standing and added vanc and zosyn for tracheitis (s/p 7 day course ended on 6/11)  - Per neurology, patient will require 2 rounds of IVIG qMonthly for 3 months. Then neurology will re-evaluate need for more treatment.  - Plan for patient to return to [facility] on June 30th for next round of plasmapheresis. LTAC physician should call to give signout to admitting team prior to admission.

## 2019-06-11 NOTE — PROGRESS NOTE ADULT - ASSESSMENT
53yo F w/ known stage IV endometrial cancer, likely with progression of disease, complex fistulae, chronic indwelling [place] admitted with fever and urosepsis originally requiring MICU admission for septic shock and respiratory failure which improved; however course c/b progressive ascending weakness while on medical telemetry/SDU despite initiation of IVIG, which was further c/b acute hypercapnic respiratory failure requiring reintubation and readmission to MICU for further management. Had shown some improvement, but 5/13 again with hypercarbic respiratory failure requiring intubation. On Vent and trach. Currently undergoing plasmapheresis. Had a RR yesterday during OT (due to dyspnea). Palliative following for symptom management, patient/family support, and goals of care.

## 2019-06-11 NOTE — PROGRESS NOTE ADULT - ASSESSMENT
58 yo F PMHx polio, breast cancer, DM, Endometrial Cancer s/p exploratory laparotomy, enterolysis, SHAMIR, BSO, pelvic lymphadenectomy, low anterior resection mobilization of splenic flexure, end colostomy on 7/30/18, rectovaginal fistula, numerous admissions for urinary tract infection presented to [facility] in the setting of urosepsis. Hospital course complicated by ESBL E coli bacteremia (completed ertapenem on 5/14 ) and respiratory failure requiring multiple intubations 2/2 AIDP, now s/p trach and PEG (5/21), s/p treatment for sepsis 2/2 MRSA PNA w/ linezolid. Now on vanco/zosyn for tracheitis.  Now s/p 2nd round of plasmapheresis. Now with re-opening of enteric-vaginal fistula, on strict NPO requiring TPN. Now stable for LTAC pending bed acceptance.

## 2019-06-11 NOTE — PROGRESS NOTE ADULT - PROBLEM SELECTOR PLAN 3
responded to plasmapheresis with increased strength in upper body  - prognosis overall remains guarded given her multiple chronic comorbidities, the prospect of cytotoxic chemotherapy initiation for her malignancy per GYN, and now on ventilation and Peg-tube.  Peg-tube feeding was stopped due to detection of enterovaginal fistula on CT scan yesterday. Wants to continue with treatment

## 2019-06-11 NOTE — PROGRESS NOTE ADULT - ASSESSMENT
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 Clinical worsening earlier in week likely 2/2 to anemia more than relapse of disease as improvement was seen prior to PLEX. Patient will need continued PLEX at least for 3 months with reassessment after.    - Next PLEX due 6/30 at [facility] facility aware and will arrange for transport  - Mobilize to chair and CPAP trials as tolerated  - PT as tolerated  - awaiting LTAC placement, possibly today, will follow with Dr. Sellers for outpatient care    Discussed with Dr. 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 Clinical worsening earlier in week likely 2/2 to anemia more than relapse of disease as improvement was seen prior to PLEX. Patient will need continued PLEX at least for 3 months with reassessment after.    - Next PLEX due 7/1 at [facility] facility aware and will arrange for transport  - Mobilize to chair and CPAP trials as tolerated  - PT as tolerated  - awaiting LTAC placement, possibly today, will follow with Dr. Sellers for outpatient care    Discussed with Dr. Sellers

## 2019-06-12 PROCEDURE — 99232 SBSQ HOSP IP/OBS MODERATE 35: CPT | Mod: GC

## 2019-06-12 PROCEDURE — 99232 SBSQ HOSP IP/OBS MODERATE 35: CPT

## 2019-06-12 RX ORDER — I.V. FAT EMULSION 20 G/100ML
0.94 EMULSION INTRAVENOUS
Qty: 50 | Refills: 0 | Status: DISCONTINUED | OUTPATIENT
Start: 2019-06-12 | End: 2019-06-12

## 2019-06-12 RX ORDER — ELECTROLYTE SOLUTION,INJ
1 VIAL (ML) INTRAVENOUS
Qty: 0 | Refills: 0 | DISCHARGE
Start: 2019-06-12

## 2019-06-12 RX ORDER — IPRATROPIUM/ALBUTEROL SULFATE 18-103MCG
3 AEROSOL WITH ADAPTER (GRAM) INHALATION
Qty: 0 | Refills: 0 | DISCHARGE
Start: 2019-06-12

## 2019-06-12 RX ORDER — I.V. FAT EMULSION 20 G/100ML
0 EMULSION INTRAVENOUS
Qty: 0 | Refills: 0 | DISCHARGE
Start: 2019-06-12

## 2019-06-12 RX ORDER — ELECTROLYTE SOLUTION,INJ
1 VIAL (ML) INTRAVENOUS
Refills: 0 | Status: DISCONTINUED | OUTPATIENT
Start: 2019-06-12 | End: 2019-06-12

## 2019-06-12 RX ADMIN — Medication 3 MILLILITER(S): at 00:51

## 2019-06-12 RX ADMIN — Medication 1 EACH: at 18:49

## 2019-06-12 RX ADMIN — Medication 1000 MILLIGRAM(S): at 22:25

## 2019-06-12 RX ADMIN — Medication 3 MILLILITER(S): at 11:43

## 2019-06-12 RX ADMIN — CHLORHEXIDINE GLUCONATE 15 MILLILITER(S): 213 SOLUTION TOPICAL at 11:43

## 2019-06-12 RX ADMIN — SODIUM CHLORIDE 3 MILLILITER(S): 9 INJECTION INTRAMUSCULAR; INTRAVENOUS; SUBCUTANEOUS at 22:24

## 2019-06-12 RX ADMIN — ENOXAPARIN SODIUM 40 MILLIGRAM(S): 100 INJECTION SUBCUTANEOUS at 19:28

## 2019-06-12 RX ADMIN — CHLORHEXIDINE GLUCONATE 15 MILLILITER(S): 213 SOLUTION TOPICAL at 22:23

## 2019-06-12 RX ADMIN — Medication 3 MILLILITER(S): at 05:56

## 2019-06-12 RX ADMIN — NYSTATIN CREAM 1 APPLICATION(S): 100000 CREAM TOPICAL at 17:38

## 2019-06-12 RX ADMIN — FLUDROCORTISONE ACETATE 0.1 MILLIGRAM(S): 0.1 TABLET ORAL at 05:56

## 2019-06-12 RX ADMIN — SODIUM CHLORIDE 3 MILLILITER(S): 9 INJECTION INTRAMUSCULAR; INTRAVENOUS; SUBCUTANEOUS at 17:37

## 2019-06-12 RX ADMIN — CHLORHEXIDINE GLUCONATE 1 APPLICATION(S): 213 SOLUTION TOPICAL at 05:56

## 2019-06-12 RX ADMIN — SODIUM CHLORIDE 3 MILLILITER(S): 9 INJECTION INTRAMUSCULAR; INTRAVENOUS; SUBCUTANEOUS at 05:56

## 2019-06-12 RX ADMIN — I.V. FAT EMULSION 20.88 GM/KG/DAY: 20 EMULSION INTRAVENOUS at 18:48

## 2019-06-12 RX ADMIN — MIDODRINE HYDROCHLORIDE 10 MILLIGRAM(S): 2.5 TABLET ORAL at 01:57

## 2019-06-12 RX ADMIN — Medication 3 MILLILITER(S): at 17:37

## 2019-06-12 RX ADMIN — SODIUM CHLORIDE 3 MILLILITER(S): 9 INJECTION INTRAMUSCULAR; INTRAVENOUS; SUBCUTANEOUS at 11:43

## 2019-06-12 RX ADMIN — SODIUM CHLORIDE 3 MILLILITER(S): 9 INJECTION INTRAMUSCULAR; INTRAVENOUS; SUBCUTANEOUS at 01:57

## 2019-06-12 RX ADMIN — INSULIN HUMAN 2: 100 INJECTION, SOLUTION SUBCUTANEOUS at 00:51

## 2019-06-12 RX ADMIN — MIDODRINE HYDROCHLORIDE 10 MILLIGRAM(S): 2.5 TABLET ORAL at 11:43

## 2019-06-12 RX ADMIN — INSULIN HUMAN 2: 100 INJECTION, SOLUTION SUBCUTANEOUS at 12:49

## 2019-06-12 RX ADMIN — NYSTATIN CREAM 1 APPLICATION(S): 100000 CREAM TOPICAL at 05:56

## 2019-06-12 RX ADMIN — MIDODRINE HYDROCHLORIDE 10 MILLIGRAM(S): 2.5 TABLET ORAL at 17:37

## 2019-06-12 NOTE — PROGRESS NOTE ADULT - ASSESSMENT
58 yo F PMHx polio, breast cancer, DM, Endometrial Cancer s/p exploratory laparotomy, enterolysis, SHAMIR, BSO, pelvic lymphadenectomy, low anterior resection mobilization of splenic flexure, end colostomy on 7/30/18, rectovaginal fistula, numerous admissions for urinary tract infection presented to [facility] in the setting of urosepsis. Hospital course complicated by ESBL E coli bacteremia (completed ertapenem on 5/14 ) and respiratory failure requiring multiple intubations 2/2 AIDP, now s/p trach and PEG (5/21), s/p treatment for sepsis 2/2 MRSA PNA w/ linezolid. Now on vanco/zosyn for tracheitis.  Now s/p 2nd round of plasmapheresis. Now with re-opening of enteric-vaginal fistula, on strict NPO requiring TPN. Now stable for LTAC pending bed acceptance.

## 2019-06-12 NOTE — PROGRESS NOTE ADULT - ATTENDING COMMENTS
Patient was seen and examined with the resident team today.  I agree with Dr. Vincent's assessment and plan with the following exceptions/additions:     This is a 58yo woman with a PMH of polio c/b post-polio syndrome, breast cancer, endometrial cancer s/p explap, enterolysis, SHAMIR, BSO, pelvic lymphadenectomy, low anterior resection mobilization of splenic flexure and end colostomy (7/30/18), rectovaginal fistula, as well as, numerous admissions for urinary tract infections who was initially admitted for ESBL E. coli bacteremia c/b septic shock, now s/p a protracted hospital course.  Admission has been complicated by acute (now chronic) hypercapnic respiratory failure requiring multiple intubations 2/2 AIDP (s/p IVIG and plasmapheresis), now s/p trach and PEG ( 5/21).  Latter part of hospitalization developed another episode of sepsis 2/2 MRSA HAP/VAP and VRE UTI.  Last day of antibiotics on 5/30.  Patient was nearing discharge on 5/31, but her discharge was delayed 2/2 difficult outpatient coordination of treatment.  She underwent plasmapheresis x2 the week of 6/31 without complication but did require 1U PRBC given her chronic anemia w/superimposed daily phlebotomy.  She was subsequently noted to have re-opening of her enterovaginal fistula, prompting initiation of TPN, as well as tracheitis (i/s/o a rapid response) resulting in initiation of Vancomycin.  She is now medically stable with antibiotics discontinued today.      She is due for her next session of plasmapheresis on 6/30.  Given her medical complexity, she will require direct admissions for planned plasmapheresis.  Neurology and Transfusion Medicine are aware and in accordance with this plan.  Each admission should be initiated with physician-to-physician sign-out (initiated by LTAC, see above for contact information) no later than 24 hours prior to admission.     Maya Christian  [phone]

## 2019-06-12 NOTE — PROGRESS NOTE ADULT - PROBLEM SELECTOR PLAN 3
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  - C/w plan as per above problem 1 & 2  - Neurology following  - plasmapheresis as in problem #1

## 2019-06-12 NOTE — PROGRESS NOTE ADULT - PROBLEM SELECTOR PLAN 8
Hx of urinary retention/overflow incontinence   - Joseph changed on 5/17   - ct abdomen shows mild L hydro w obstructing stone on 6/6  - per urology, continue w joseph  - ordered kidney and bladder US to eval for hydronephrosis    #PEG placement  - Pt s/p PEG placement 5/21   - now w pain around PEG site. flushes well per nursing. Will have GI evaluate     #rectovaginal fistula  - per GYN onc, hx of recto vaginal fistula that resolved spontaneously, however now has recurred  - confirmed entero vaginal fistula on CT pelvis  - appreciate gyn recs  -NPO  -TPN

## 2019-06-12 NOTE — PROGRESS NOTE ADULT - PROBLEM SELECTOR PLAN 1
2/2 to demyelinating polyneuropathy (AIDP)   s/p trach (5/21) and s/p 4 doses of IVIG finished 5/11 and 5 rounds of plasmapheresis (finished 5/21), additional 2 rounds of plasmapheresis finished on 6/6  - C/w mechanical ventilation at night and cpap during day. wean as tolerated  - C/w frequent suctioning (q4h)  - pulm consult for increased secretions and possible mucus plugging - duonebs q6h standing and added vanc and zosyn for tracheitis (s/p 7 day course ended on 6/11)  - Per neurology, patient will require 2 rounds of plasmapheresis qMonthly for 3 months. Then neurology will re-evaluate need for more treatment.  - Plan for patient to return to [facility] on June 30th at noon for next round of plasmapheresis. LTAC physician should call to give signout to Dr. Sellers (admitting attending) on day prior to admission. Dr. Sellers can be reached at [phone].

## 2019-06-12 NOTE — PROGRESS NOTE ADULT - ASSESSMENT
60yo F HD45 with stage IV endometrial AdenoCA s/p staging surgery '18 and 1 round of chemotherapy 2/19 readmitted from  [facility] with urosepsis and poor respiratory status which is now thought to be secondary to diagnosis of Guillain Chaseley Syndrome/AIDP.  Hospital course notable for prolonged intubation and MICU admission, transitioned to tracheostomy and PEG tube on 5/21, now weened off pressors and transferred to chronic vent unit on [facility].   [facility] however unable to transfer due to lack of IV IG, thus in house for neurology recommendations for treatment. Now per  and primary team, [facility] be [facility] in 1 month for plasma exchange   Patient has history of enterovaginal fistula which was previously treated conservatively and appeared to have resolved per imaging and resolution of leaking per vagina. Now patient found to have a recurrence of fistula, confirmed on CT scan on 6/5, with leaking of stool noted previously, reports stool leakage from vagina overnight,.    -Fistula: manage conservatively w/ bowel rest. Continue TPN and holding tube feeds. Continue to monitor output per vagina. Primary team to consult general surgery prior to d/c to asses J-PEG site for leaking.   -: Neurogenic bladder w/ Indwelling joseph in place  When stable, consider urology consult for long term management i.e. for placement suprapubic catheter  - GYN malignancy:  s/p Anastrazole, s/p Megace 80mg BID x3 wks , s/p Tamoxifen 5/1-5/17 (stopped due to upper extremity DVT). pathology serous on review of pathology, when stable for genetic counselor, Once patient recovers from acute neurologic issue will reassess cancer treatment options, - ID: suspected tracheitis - continue zosyn and vancomycin. Currently tracking down initial pathology report from [facility]   Will avoid immunotherapy at this time given associated risk of autoimmune disease   - Social work/palliative:  DNR/DNI. Interdisciplinary meeting was done on 5/23.

## 2019-06-12 NOTE — PROGRESS NOTE ADULT - SUBJECTIVE AND OBJECTIVE BOX
Patient evaluated at the bedside. No acute events overnight.. Patient reports possibility of going to long term assisted care facility, today.    Denies CP/SOB/dizziness/nausea/vomiting/abdominal pain/calf pain.  Pain well controlled on oral pain medications. Patient is ambulating independently, passing flatus and tolerating a regular diet.    O:   T(C): 37 (06-12-19 @ 09:14), Max: 37.4 (06-11-19 @ 17:23)  HR: 81 (06-12-19 @ 09:21) (65 - 88)  BP: 117/63 (06-12-19 @ 09:14) (108/74 - 143/74)  RR: 18 (06-12-19 @ 09:21) (16 - 20)  SpO2: 100% (06-12-19 @ 09:21) (99% - 100%)  Wt(kg): --    GEN: patient appears well  LUNGS: no respiratory distress  ABD:  Pelvic:   EXT: no calf tenderness        06-11 @ 07:01  -  06-12 @ 07:00  --------------------------------------------------------  IN: 0 mL / OUT: 2550 mL / NET: -2550 mL              Assessment and Plan:     1. ID:   2. FEN/GI - Regular diet as tolerated.   3. PAIN - Controlled with oral pain medications  4.  - Voiding spontaneously.    5. RESP -  No acute issues.   6. VTE prophylaxis - SCDs, ambulate as tolerated.   7. LABS -    8. DISPO - Patient evaluated at the bedside. No acute events overnight.. Patient reports possibility of going to long term assisted care facility, today.  Patient reporting burning of skin surrounding her J-PEG site.     Denies CP/SOB/dizziness/nausea/vomiting/abdominal pain/calf pain.      O:   T(C): 37 (06-12-19 @ 09:14), Max: 37.4 (06-11-19 @ 17:23)  HR: 81 (06-12-19 @ 09:21) (65 - 88)  BP: 117/63 (06-12-19 @ 09:14) (108/74 - 143/74)  RR: 18 (06-12-19 @ 09:21) (16 - 20)  SpO2: 100% (06-12-19 @ 09:21) (99% - 100%)  Wt(kg): --    Pulm: nonlabored breathing through trach tube  Abd: soft, NTND, no rebound or guarding, scant dark liquid fluid per ostomy, J-PEG site intact, small border of erythema surrounding insertion, no active leakage of contents, no lesions noted.  : joseph in place, no stool on external genitalia   Extrem: SCDs in place        06-11 @ 07:01  -  06-12 @ 07:00  --------------------------------------------------------  IN: 0 mL / OUT: 2550 mL / NET: -2550 mL                          7.3    7.52  )-----------( 325      ( 11 Jun 2019 06:25 )             23.9

## 2019-06-12 NOTE — PROGRESS NOTE ADULT - SUBJECTIVE AND OBJECTIVE BOX
OVERNIGHT EVENTS: KESHAWN    SUBJECTIVE: Patient sad this morning. States she wants to go to the LTAC. Also does not want any labs draws. She is complaining about pain around the PEG site.    Vital Signs Last 12 Hrs  T(F): 98.6 (06-12-19 @ 09:14), Max: 98.6 (06-12-19 @ 09:14)  HR: 81 (06-12-19 @ 09:21) (68 - 81)  BP: 117/63 (06-12-19 @ 09:14) (108/74 - 136/83)  BP(mean): --  RR: 18 (06-12-19 @ 09:21) (16 - 18)  SpO2: 100% (06-12-19 @ 09:21) (100% - 100%)  I&O's Summary    11 Jun 2019 07:01  -  12 Jun 2019 07:00  --------------------------------------------------------  IN: 0 mL / OUT: 2550 mL / NET: -2550 mL        PHYSICAL EXAM:  Constitutional: NAD, comfortable in bed.  HEENT: NC/AT, PERRLA, EOMI, no conjunctival pallor or scleral icterus, MMM  Neck: Supple, no JVD, tracheostomy in place without secretions surrounding 1 cm protruded  Respiratory: on cpap, lungs still w diffuse coarse rhonchi  Cardiovascular: RRR, normal S1 and S2, no m/r/g.   Gastrointestinal: +BS, soft NTND, no guarding or rebound tenderness, no palpable masses, PEG in place - no signs of infection, however there is tenderness around site today, colostomy in place  : joseph in place  Extremities: wwp; no cyanosis, clubbing or edema.   Vascular: Pulses equal and strong throughout, R PICC line, R chest chemoport - accessed, L subclavian HD cath  Neurological: AAOx3, no CN deficits, strength 5/5 in UEs and 1/5 in LEs, sensation intact throughout.         LABS:                        7.3    7.52  )-----------( 325      ( 11 Jun 2019 06:25 )             23.9                 RADIOLOGY & ADDITIONAL TESTS:    MEDICATIONS  (STANDING):  albumin human  5% IVPB 2750 milliLiter(s) IV Intermittent once  albumin human  5% IVPB 2750 milliLiter(s) IV Intermittent once  ALBUTerol/ipratropium for Nebulization 3 milliLiter(s) Nebulizer every 6 hours  calcium gluconate IVPB 1 Gram(s) IV Intermittent once  calcium gluconate IVPB 1 Gram(s) IV Intermittent once  chlorhexidine 0.12% Liquid 15 milliLiter(s) Oral Mucosa every 12 hours  chlorhexidine 2% Cloths 1 Application(s) Topical <User Schedule>  dextrose 5%. 1000 milliLiter(s) (50 mL/Hr) IV Continuous <Continuous>  dextrose 50% Injectable 25 Gram(s) IV Push once  enoxaparin Injectable 40 milliGRAM(s) SubCutaneous every 24 hours  fat emulsion (Plant Based) 20% Infusion 0.94 Gm/kG/Day (20.876 mL/Hr) IV Continuous <Continuous>  fludroCORTISONE 0.1 milliGRAM(s) Oral every 24 hours  heparin 1000 units/mL 2000 Unit(s) 2000 Unit(s) IV Push once  heparin 1000units/mL 4000 Unit(s) 4000 Unit(s) IV Push once  insulin regular  human corrective regimen sliding scale   SubCutaneous every 6 hours  midodrine 10 milliGRAM(s) Oral every 8 hours  morphine  - Injectable 1 milliGRAM(s) IV Push once  morphine  - Injectable 2 milliGRAM(s) IV Push once  nystatin Cream 1 Application(s) Topical two times a day  Parenteral Nutrition - Adult 1 Each (63 mL/Hr) TPN Continuous <Continuous>  Parenteral Nutrition - Adult 1 Each (63 mL/Hr) TPN Continuous <Continuous>  sodium chloride 3%  Inhalation 3 milliLiter(s) Inhalation every 4 hours    MEDICATIONS  (PRN):  acetaminophen    Suspension .. 1000 milliGRAM(s) Oral every 8 hours PRN Moderate Pain (4 - 6)  dextrose 40% Gel 15 Gram(s) Oral once PRN Blood Glucose LESS THAN 70 milliGRAM(s)/deciliter  glucagon  Injectable 1 milliGRAM(s) IntraMuscular once PRN Glucose LESS THAN 70 milligrams/deciliter  lidocaine 2% Gel 1 Application(s) Topical two times a day PRN pain around PEG site  sodium chloride 0.9% lock flush 10 milliLiter(s) IV Push every 1 hour PRN Pre/post blood products, medications, blood draw, and to maintain line patency

## 2019-06-13 LAB
ANION GAP SERPL CALC-SCNC: 13 MMOL/L — SIGNIFICANT CHANGE UP (ref 5–17)
BUN SERPL-MCNC: 26 MG/DL — HIGH (ref 7–23)
CALCIUM SERPL-MCNC: 9.4 MG/DL — SIGNIFICANT CHANGE UP (ref 8.4–10.5)
CHLORIDE SERPL-SCNC: 101 MMOL/L — SIGNIFICANT CHANGE UP (ref 96–108)
CO2 SERPL-SCNC: 25 MMOL/L — SIGNIFICANT CHANGE UP (ref 22–31)
CREAT SERPL-MCNC: 0.51 MG/DL — SIGNIFICANT CHANGE UP (ref 0.5–1.3)
GLUCOSE SERPL-MCNC: 145 MG/DL — HIGH (ref 70–99)
HCT VFR BLD CALC: 25.8 % — LOW (ref 34.5–45)
HGB BLD-MCNC: 7.9 G/DL — LOW (ref 11.5–15.5)
MAGNESIUM SERPL-MCNC: 1.9 MG/DL — SIGNIFICANT CHANGE UP (ref 1.6–2.6)
MCHC RBC-ENTMCNC: 28.6 PG — SIGNIFICANT CHANGE UP (ref 27–34)
MCHC RBC-ENTMCNC: 30.6 GM/DL — LOW (ref 32–36)
MCV RBC AUTO: 93.5 FL — SIGNIFICANT CHANGE UP (ref 80–100)
NRBC # BLD: 0 /100 WBCS — SIGNIFICANT CHANGE UP (ref 0–0)
PLATELET # BLD AUTO: 402 K/UL — HIGH (ref 150–400)
POTASSIUM SERPL-MCNC: 3.5 MMOL/L — SIGNIFICANT CHANGE UP (ref 3.5–5.3)
POTASSIUM SERPL-SCNC: 3.5 MMOL/L — SIGNIFICANT CHANGE UP (ref 3.5–5.3)
RBC # BLD: 2.76 M/UL — LOW (ref 3.8–5.2)
RBC # FLD: 15.8 % — HIGH (ref 10.3–14.5)
SODIUM SERPL-SCNC: 139 MMOL/L — SIGNIFICANT CHANGE UP (ref 135–145)
WBC # BLD: 7.92 K/UL — SIGNIFICANT CHANGE UP (ref 3.8–10.5)
WBC # FLD AUTO: 7.92 K/UL — SIGNIFICANT CHANGE UP (ref 3.8–10.5)

## 2019-06-13 PROCEDURE — 99232 SBSQ HOSP IP/OBS MODERATE 35: CPT | Mod: GC

## 2019-06-13 PROCEDURE — 99232 SBSQ HOSP IP/OBS MODERATE 35: CPT

## 2019-06-13 RX ORDER — ELECTROLYTE SOLUTION,INJ
1 VIAL (ML) INTRAVENOUS
Refills: 0 | Status: DISCONTINUED | OUTPATIENT
Start: 2019-06-13 | End: 2019-06-13

## 2019-06-13 RX ORDER — POTASSIUM CHLORIDE 20 MEQ
40 PACKET (EA) ORAL ONCE
Refills: 0 | Status: COMPLETED | OUTPATIENT
Start: 2019-06-13 | End: 2019-06-13

## 2019-06-13 RX ADMIN — SODIUM CHLORIDE 3 MILLILITER(S): 9 INJECTION INTRAMUSCULAR; INTRAVENOUS; SUBCUTANEOUS at 02:09

## 2019-06-13 RX ADMIN — MIDODRINE HYDROCHLORIDE 10 MILLIGRAM(S): 2.5 TABLET ORAL at 02:09

## 2019-06-13 RX ADMIN — SODIUM CHLORIDE 3 MILLILITER(S): 9 INJECTION INTRAMUSCULAR; INTRAVENOUS; SUBCUTANEOUS at 13:01

## 2019-06-13 RX ADMIN — CHLORHEXIDINE GLUCONATE 1 APPLICATION(S): 213 SOLUTION TOPICAL at 06:03

## 2019-06-13 RX ADMIN — Medication 3 MILLILITER(S): at 18:40

## 2019-06-13 RX ADMIN — INSULIN HUMAN 2: 100 INJECTION, SOLUTION SUBCUTANEOUS at 00:30

## 2019-06-13 RX ADMIN — FLUDROCORTISONE ACETATE 0.1 MILLIGRAM(S): 0.1 TABLET ORAL at 06:02

## 2019-06-13 RX ADMIN — ENOXAPARIN SODIUM 40 MILLIGRAM(S): 100 INJECTION SUBCUTANEOUS at 19:21

## 2019-06-13 RX ADMIN — Medication 3 MILLILITER(S): at 00:30

## 2019-06-13 RX ADMIN — Medication 3 MILLILITER(S): at 06:02

## 2019-06-13 RX ADMIN — CHLORHEXIDINE GLUCONATE 15 MILLILITER(S): 213 SOLUTION TOPICAL at 22:00

## 2019-06-13 RX ADMIN — Medication 1 EACH: at 19:21

## 2019-06-13 RX ADMIN — MIDODRINE HYDROCHLORIDE 10 MILLIGRAM(S): 2.5 TABLET ORAL at 13:04

## 2019-06-13 RX ADMIN — SODIUM CHLORIDE 3 MILLILITER(S): 9 INJECTION INTRAMUSCULAR; INTRAVENOUS; SUBCUTANEOUS at 22:01

## 2019-06-13 RX ADMIN — NYSTATIN CREAM 1 APPLICATION(S): 100000 CREAM TOPICAL at 06:03

## 2019-06-13 RX ADMIN — NYSTATIN CREAM 1 APPLICATION(S): 100000 CREAM TOPICAL at 18:41

## 2019-06-13 RX ADMIN — SODIUM CHLORIDE 3 MILLILITER(S): 9 INJECTION INTRAMUSCULAR; INTRAVENOUS; SUBCUTANEOUS at 06:02

## 2019-06-13 RX ADMIN — SODIUM CHLORIDE 3 MILLILITER(S): 9 INJECTION INTRAMUSCULAR; INTRAVENOUS; SUBCUTANEOUS at 18:40

## 2019-06-13 RX ADMIN — Medication 3 MILLILITER(S): at 12:59

## 2019-06-13 RX ADMIN — MIDODRINE HYDROCHLORIDE 10 MILLIGRAM(S): 2.5 TABLET ORAL at 22:00

## 2019-06-13 RX ADMIN — Medication 40 MILLIEQUIVALENT(S): at 12:59

## 2019-06-13 NOTE — PROGRESS NOTE ADULT - SUBJECTIVE AND OBJECTIVE BOX
OVERNIGHT EVENTS: KESHAWN    SUBJECTIVE: Patient in better spirits today. States that the area around her PEG is less painful. Reports that she still has cough, but has no issues w breathing w vent.    Vital Signs Last 12 Hrs  T(F): 98.5 (06-13-19 @ 05:56), Max: 99.4 (06-12-19 @ 20:49)  HR: 77 (06-13-19 @ 05:56) (77 - 84)  BP: 134/75 (06-13-19 @ 05:56) (121/75 - 141/82)  BP(mean): --  RR: 20 (06-13-19 @ 05:56) (20 - 24)  SpO2: 100% (06-13-19 @ 05:56) (100% - 100%)  I&O's Summary    12 Jun 2019 07:01  -  13 Jun 2019 07:00  --------------------------------------------------------  IN: 0 mL / OUT: 1800 mL / NET: -1800 mL        PHYSICAL EXAM:  Constitutional: NAD, comfortable in bed.  HEENT: NC/AT, PERRLA, EOMI, no conjunctival pallor or scleral icterus, MMM  Neck: Supple, no JVD, tracheostomy in place without secretions surrounding 1 cm protruded  Respiratory: on cpap, lungs still w diffuse coarse rhonchi  Cardiovascular: RRR, normal S1 and S2, no m/r/g.   Gastrointestinal: +BS, soft NTND, no guarding or rebound tenderness, no palpable masses, PEG in place - no signs of infection, colostomy in place w brown stool  : joseph in place  Extremities: wwp; no cyanosis, clubbing or edema.   Vascular: Pulses equal and strong throughout, R PICC line, R chest chemoport - accessed, L subclavian HD cath  Neurological: AAOx3, no CN deficits, strength 5/5 in UEs and 1/5 in LEs, sensation intact throughout.         LABS:                        7.9    7.92  )-----------( 402      ( 13 Jun 2019 07:54 )             25.8     06-12    139  |  102  |  24<H>  ----------------------------<  171<H>  3.7   |  25  |  0.44<L>    Ca    9.6      12 Jun 2019 12:25  Phos  3.9     06-12  Mg     2.0     06-12            RADIOLOGY & ADDITIONAL TESTS:    MEDICATIONS  (STANDING):  albumin human  5% IVPB 2750 milliLiter(s) IV Intermittent once  albumin human  5% IVPB 2750 milliLiter(s) IV Intermittent once  ALBUTerol/ipratropium for Nebulization 3 milliLiter(s) Nebulizer every 6 hours  calcium gluconate IVPB 1 Gram(s) IV Intermittent once  calcium gluconate IVPB 1 Gram(s) IV Intermittent once  chlorhexidine 0.12% Liquid 15 milliLiter(s) Oral Mucosa every 12 hours  chlorhexidine 2% Cloths 1 Application(s) Topical <User Schedule>  dextrose 5%. 1000 milliLiter(s) (50 mL/Hr) IV Continuous <Continuous>  dextrose 50% Injectable 25 Gram(s) IV Push once  enoxaparin Injectable 40 milliGRAM(s) SubCutaneous every 24 hours  fat emulsion (Plant Based) 20% Infusion 0.94 Gm/kG/Day (20.876 mL/Hr) IV Continuous <Continuous>  fludroCORTISONE 0.1 milliGRAM(s) Oral every 24 hours  heparin 1000 units/mL 2000 Unit(s) 2000 Unit(s) IV Push once  heparin 1000units/mL 4000 Unit(s) 4000 Unit(s) IV Push once  insulin regular  human corrective regimen sliding scale   SubCutaneous every 6 hours  midodrine 10 milliGRAM(s) Oral every 8 hours  morphine  - Injectable 1 milliGRAM(s) IV Push once  morphine  - Injectable 2 milliGRAM(s) IV Push once  nystatin Cream 1 Application(s) Topical two times a day  Parenteral Nutrition - Adult 1 Each (63 mL/Hr) TPN Continuous <Continuous>  sodium chloride 3%  Inhalation 3 milliLiter(s) Inhalation every 4 hours    MEDICATIONS  (PRN):  acetaminophen    Suspension .. 1000 milliGRAM(s) Oral every 8 hours PRN Moderate Pain (4 - 6)  dextrose 40% Gel 15 Gram(s) Oral once PRN Blood Glucose LESS THAN 70 milliGRAM(s)/deciliter  glucagon  Injectable 1 milliGRAM(s) IntraMuscular once PRN Glucose LESS THAN 70 milligrams/deciliter  lidocaine 2% Gel 1 Application(s) Topical two times a day PRN pain around PEG site  sodium chloride 0.9% lock flush 10 milliLiter(s) IV Push every 1 hour PRN Pre/post blood products, medications, blood draw, and to maintain line patency

## 2019-06-13 NOTE — PROGRESS NOTE ADULT - ASSESSMENT
60 yo F PMHx polio, breast cancer, DM, Endometrial Cancer s/p exploratory laparotomy, enterolysis, SHAMIR, BSO, pelvic lymphadenectomy, low anterior resection mobilization of splenic flexure, end colostomy on 7/30/18, rectovaginal fistula, numerous admissions for urinary tract infection presented to [facility] in the setting of urosepsis. Hospital course complicated by ESBL E coli bacteremia (completed ertapenem on 5/14 ) and respiratory failure requiring multiple intubations 2/2 AIDP, now s/p trach and PEG (5/21), s/p treatment for sepsis 2/2 MRSA PNA w/ linezolid. Now on vanco/zosyn for tracheitis.  Now s/p 2nd round of plasmapheresis. Now with re-opening of enteric-vaginal fistula, on strict NPO requiring TPN. Now stable for LTAC pending bed acceptance.

## 2019-06-13 NOTE — PROGRESS NOTE ADULT - SUBJECTIVE AND OBJECTIVE BOX
Patient evaluated at the bedside. Patient tearful and expressing fear of passing in her sleep but reports decreased respiratory secretions and improved breathing. Patient states she is unable to sleep due to these anxieties and requests melatonin. Patient refuses a psych consult, states she does not want any antidepressants or nonmedical interventions. Patient was informed that she will no longer be able to go to [facility] as her plasmapheresis [id number] precludes her.    Denies CP/SOB/dizziness/nausea/vomiting/abdominal pain/calf pain.      O:   Vital Signs Last 24 Hrs  T(C): 37.1 (13 Jun 2019 16:25), Max: 37.4 (12 Jun 2019 20:49)  T(F): 98.7 (13 Jun 2019 16:25), Max: 99.4 (12 Jun 2019 20:49)  HR: 80 (13 Jun 2019 16:25) (68 - 84)  BP: 142/88 (13 Jun 2019 16:25) (121/75 - 151/77)  BP(mean): --  RR: 20 (13 Jun 2019 16:25) (18 - 24)  SpO2: 100% (13 Jun 2019 16:25) (100% - 100%)    Pulm: nonlabored breathing through trach tube  Abd: soft, NTND, no rebound or guarding, scant dark liquid fluid per ostomy, J-PEG site intact, small border of erythema surrounding insertion, no active leakage of contents, no lesions noted.  : joseph in place, no stool on external genitalia   Extrem: SCDs in place    I&O's Detail    12 Jun 2019 07:01  -  13 Jun 2019 07:00  --------------------------------------------------------  IN:  Total IN: 0 mL    OUT:    Indwelling Catheter - Urethral: 1800 mL  Total OUT: 1800 mL    Total NET: -1800 mL                            7.9    7.92  )-----------( 402      ( 13 Jun 2019 07:54 )             25.8   06-13    139  |  101  |  26<H>  ----------------------------<  145<H>  3.5   |  25  |  0.51    Ca    9.4      13 Jun 2019 07:54  Phos  3.9     06-12  Mg     1.9     06-13 Patient evaluated at the bedside. Patient tearful and expressing fear of passing in her sleep but reports decreased respiratory secretions and improved breathing. Patient states she is unable to sleep due to these anxieties and requests melatonin. Patient refuses a psych consult, states she does not want any antidepressants or nonmedical interventions. Patient was informed that she will no longer be able to go to [facility] as her plasmapheresis [id number] precludes her. She denies leakage of stool from her vagina.    Denies CP/SOB/dizziness/nausea/vomiting/abdominal pain/calf pain.      O:   Vital Signs Last 24 Hrs  T(C): 37.1 (13 Jun 2019 16:25), Max: 37.4 (12 Jun 2019 20:49)  T(F): 98.7 (13 Jun 2019 16:25), Max: 99.4 (12 Jun 2019 20:49)  HR: 80 (13 Jun 2019 16:25) (68 - 84)  BP: 142/88 (13 Jun 2019 16:25) (121/75 - 151/77)  BP(mean): --  RR: 20 (13 Jun 2019 16:25) (18 - 24)  SpO2: 100% (13 Jun 2019 16:25) (100% - 100%)    Pulm: nonlabored breathing through trach tube  Abd: soft, NTND, no rebound or guarding, scant dark liquid fluid per ostomy, J-PEG site intact, small border of erythema surrounding insertion, no active leakage of contents, no lesions noted.  : joseph in place, no stool on external genitalia   Extrem: SCDs in place    I&O's Detail    12 Jun 2019 07:01  -  13 Jun 2019 07:00  --------------------------------------------------------  IN:  Total IN: 0 mL    OUT:    Indwelling Catheter - Urethral: 1800 mL  Total OUT: 1800 mL    Total NET: -1800 mL                            7.9    7.92  )-----------( 402      ( 13 Jun 2019 07:54 )             25.8   06-13    139  |  101  |  26<H>  ----------------------------<  145<H>  3.5   |  25  |  0.51    Ca    9.4      13 Jun 2019 07:54  Phos  3.9     06-12  Mg     1.9     06-13

## 2019-06-13 NOTE — PROGRESS NOTE ADULT - PROBLEM SELECTOR PLAN 1
2/2 to demyelinating polyneuropathy (AIDP)   s/p trach (5/21) and s/p 4 doses of IVIG finished 5/11 and 5 rounds of plasmapheresis (finished 5/21), additional 2 rounds of plasmapheresis finished on 6/6  - C/w mechanical ventilation at night and cpap during day. wean as tolerated  - C/w frequent suctioning (q4h)  - pulm consult for increased secretions and possible mucus plugging - duonebs q6h standing and added vanc and zosyn for tracheitis (s/p 7 day course ended on 6/11)  - Per neurology, patient will require 2 rounds of plasmapheresis qMonthly for 3 months. Then neurology will re-evaluate need for more treatment.  - Plan for patient to return to [facility] on June 30th at noon for next round of plasmapheresis. LTAC physician should call to give signout to Dr. Sellers (admitting attending) on day prior to admission. Dr. Sellers can be reached at [phone].

## 2019-06-13 NOTE — PROGRESS NOTE ADULT - ASSESSMENT
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  Hospital course notable for prolonged intubation and MICU admission, transitioned to tracheostomy and PEG tube on 5/21, now weened off pressors and transferred to chronic vent unit on 4Uris.   Plan was for transfer to LTACH, however unable to transfer due to lack of IV IG, thus in house for neurology recommendations for treatment. [facility] currently not an option due to patient's requirement for q2week plasmapheresis.   Patient has history of enterovaginal fistula which was previously treated conservatively and appeared to have resolved per imaging and resolution of leaking per vagina. Now patient found to have a recurrence of fistula, confirmed on CT scan on 6/5, managed conservatively w/ TPN and bowel rest.     -Fistula: manage conservatively w/ bowel rest. Continue TPN and holding tube feeds. Continue to monitor output per vagina. Primary team to consult general surgery prior to d/c to asses J-PEG site for leaking.   -: Neurogenic bladder w/ Indwelling joseph in place  When stable, consider urology consult for long term management i.e. for placement suprapubic catheter  - GYN malignancy:  s/p Anastrazole, s/p Megace 80mg BID x3 wks , s/p Tamoxifen 5/1-5/17 (stopped due to upper extremity DVT). pathology serous on review of pathology, when stable for genetic counselor, Once patient recovers from acute neurologic issue will reassess cancer treatment options, - ID: suspected tracheitis - continue zosyn and vancomycin. Currently tracking down initial pathology report from [facility]   Will avoid immunotherapy at this time given associated risk of autoimmune disease   - Social work/palliative:  DNR/DNI. Interdisciplinary meeting was done on 5/23.

## 2019-06-13 NOTE — CHART NOTE - NSCHARTNOTEFT_GEN_A_CORE
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  Pleural effusion  Muscle weakness: generalized  Malignant neoplasm: endometrium  History of total abdominal hysterectomy and bilateral salpingo-oophorectomy: with resection of endometrial mass, low anterior resection and pelvic lymphadenectomy      Current Nutrition Order:  TPN via central line:  258g Dex, 80g AA, 50g 20% Lipids to provide in total 1700 Kcal, 80 g protein, 3.36GIR ,1.5 g/kg ABW protein    PO Intake: Good (%) [   ]  Fair (50-75%) [   ] Poor (<25%) [   ]- c/w NPO     GI Issues: + colostomy, no n/v/d/c    Pain: around PEG site, being managed/ improved     Skin Integrity: stage 2 per prior record     Labs:   06-13    139  |  101  |  26<H>  ----------------------------<  145<H>  3.5   |  25  |  0.51    Ca    9.4      13 Jun 2019 07:54  Phos  3.9     06-12  Mg     1.9     06-13      CAPILLARY BLOOD GLUCOSE      POCT Blood Glucose.: 149 mg/dL (13 Jun 2019 12:42)  POCT Blood Glucose.: 147 mg/dL (13 Jun 2019 05:58)  POCT Blood Glucose.: 162 mg/dL (13 Jun 2019 00:22)  POCT Blood Glucose.: 129 mg/dL (12 Jun 2019 17:20)      Medications:  MEDICATIONS  (STANDING):  albumin human  5% IVPB 2750 milliLiter(s) IV Intermittent once  albumin human  5% IVPB 2750 milliLiter(s) IV Intermittent once  ALBUTerol/ipratropium for Nebulization 3 milliLiter(s) Nebulizer every 6 hours  calcium gluconate IVPB 1 Gram(s) IV Intermittent once  calcium gluconate IVPB 1 Gram(s) IV Intermittent once  chlorhexidine 0.12% Liquid 15 milliLiter(s) Oral Mucosa every 12 hours  chlorhexidine 2% Cloths 1 Application(s) Topical <User Schedule>  dextrose 5%. 1000 milliLiter(s) (50 mL/Hr) IV Continuous <Continuous>  dextrose 50% Injectable 25 Gram(s) IV Push once  enoxaparin Injectable 40 milliGRAM(s) SubCutaneous every 24 hours  fludroCORTISONE 0.1 milliGRAM(s) Oral every 24 hours  heparin 1000 units/mL 2000 Unit(s) 2000 Unit(s) IV Push once  heparin 1000units/mL 4000 Unit(s) 4000 Unit(s) IV Push once  insulin regular  human corrective regimen sliding scale   SubCutaneous every 6 hours  midodrine 10 milliGRAM(s) Oral every 8 hours  morphine  - Injectable 1 milliGRAM(s) IV Push once  morphine  - Injectable 2 milliGRAM(s) IV Push once  nystatin Cream 1 Application(s) Topical two times a day  Parenteral Nutrition - Adult 1 Each (63 mL/Hr) TPN Continuous <Continuous>  Parenteral Nutrition - Adult 1 Each (63 mL/Hr) TPN Continuous <Continuous>  sodium chloride 3%  Inhalation 3 milliLiter(s) Inhalation every 4 hours    MEDICATIONS  (PRN):  acetaminophen    Suspension .. 1000 milliGRAM(s) Oral every 8 hours PRN Moderate Pain (4 - 6)  dextrose 40% Gel 15 Gram(s) Oral once PRN Blood Glucose LESS THAN 70 milliGRAM(s)/deciliter  glucagon  Injectable 1 milliGRAM(s) IntraMuscular once PRN Glucose LESS THAN 70 milligrams/deciliter  lidocaine 2% Gel 1 Application(s) Topical two times a day PRN pain around PEG site  sodium chloride 0.9% lock flush 10 milliLiter(s) IV Push every 1 hour PRN Pre/post blood products, medications, blood draw, and to maintain line patency      Weight:  57kg (4/29)   53.3kg    Weight Change:  please continue to trend wts/ while on TPN     Nutrition Focused Physical Exam: Completed [   ]  Not Pertinent [  x ]    Estimated energy needs:   ABW used for calculations as pt between % of IBW.   ABW 53.3kg, IBW 47kg, 113% IBW, ht 61", BMI 27.2   Nutrient needs based on [facility] standards of care for maintenance in older adults.   needs adjusted for age, PU stage 2, catabolic illness, vent  1599-1865kcal/day (30-35kcal/kg)  74-85g pro/day (1.4-1.6g/kg)  1599-1865ml fluid/day (30-35ml/kg)    Subjective:   55yo F w known stage IV endometrial cancer, likely with progression of disease, complex fistulae, chronic indwelling [place] admitted with fever and urosepsis. Pt intubated 2/2 acute hypoxic respiratory failure, and successfully extubated on 5/2. Pt transferred to [facility], and started on PO diet. Required reintubation on 5/8 2/2 CO2 narcosis likely d/t respiratory muscle insufficiency/ GBS/ AIDP. S/p IVIG and s/p LP. Pt extubated on 5/10, though reintubated on 5/13 for CO2 narcosis. Pt was initially declining trach/PEG and was going to be extubated, though she changed her mind and trach/PEG placed on 5/21. GOC discussion 5/23, pt made DNR/DNI however wanting to pursue nuero treatment. Moved to [facility] on 5/25 for further monitoring. Pt trached to vent was on CPAP attempting to be weaned off, trach collar trial pending as pt with episodes of apnea on vent. Able to tolerate CPAP intermittently however continues with apnea, vent placed back on ACVC mode, continues to have difficulty being weaned. Pt was stable for DC to LTAC, accepted to facilities however facilities do not do IVIG which pt requires, now pending additional options and facility for DC. Per aisha alternative option is HD cath placement with plasmapharesis [id number] for best results, per discussion in pal care rounds LTAC does not hold beds, pt will need to be readmitted to get treatment then DC back to facility same day, unclear of duration of treatment. EN stopped 6/3 d/t fistula. HD cath placed 6/5. Now per GYN recto vaginal fistula noted and confirmed 6/6, has had in past and resolved spontaneously.  Currently on bowel rest/ TPN while fistula resolves. Tolerating TPN well at this time, lytes remain WNL. Pt remains stable at this time, intermittent issues breathing on vent + pain around PEG site, have now resolved. Pending LTAC placement at this time. Will continue to follow per RD protocol.     Previous Nutrition Diagnosis: Increased nutrient needs RT increased demand for kcal/pro AEB wt loss, pressure ulcer, catabolic illness     Active [ x  ]  Resolved [   ]    If resolved, new PES:     Goal: Pt to consistently meet % of estimated needs via tolerated route     Recommendations:  1. Restart EN as feasible (Osmolite 1.2 Bola @ 44mL/hr x 24hrs plus 1 ProStat via PEG. Provides: 1056mL TV, 1367kcal, 74g protein, 866mL free H2O, 106% RDI, 1.55g/kg IBW protein, 22.5 non pro kcal)   2. C/w TPN. Recommend TPN via central line:  258g Dex, 80g AA, 50g 20% Lipids to provide in total 1700 Kcal, 80 g protein, 3.36GIR ,1.5 g/kg ABW protein  Recommend checking TG before starting TPN and then check TG weekly. Check Mg, Phos, K daily and POC BG Q6hrs. Trend daily weights. Fluids and lytes per MD discretion.   3. Monitor and replete lytes prn   4. Trend wts     Education: no ed identified     Risk Level: High [  x ] Moderate [   ] Low [   ]

## 2019-06-13 NOTE — PROGRESS NOTE ADULT - ATTENDING COMMENTS
Patient was seen and examined with the resident team today.  I agree with Dr. Vincent's assessment and plan with the following exceptions/additions:     This is a 58yo woman with a PMH of polio c/b post-polio syndrome, breast cancer, endometrial cancer s/p explap, enterolysis, SHAMIR, BSO, pelvic lymphadenectomy, low anterior resection mobilization of splenic flexure and end colostomy (7/30/18), rectovaginal fistula, as well as, numerous admissions for urinary tract infections who was initially admitted for ESBL E. coli bacteremia c/b septic shock, now s/p a protracted hospital course.  Admission has been complicated by acute (now chronic) hypercapnic respiratory failure requiring multiple intubations 2/2 AIDP (s/p IVIG and plasmapheresis), now s/p trach and PEG ( 5/21).  Latter part of hospitalization developed another episode of sepsis 2/2 MRSA HAP/VAP and VRE UTI.  Last day of antibiotics on 5/30.  Patient was nearing discharge on 5/31, but her discharge was delayed 2/2 difficult outpatient coordination of treatment.  She underwent plasmapheresis x2 the week of 6/31 without complication but did require 1U PRBC given her chronic anemia w/superimposed daily phlebotomy.  She was subsequently noted to have re-opening of her enterovaginal fistula, prompting initiation of TPN, as well as tracheitis (i/s/o a rapid response) resulting in initiation of Vancomycin.  She is now medically stable with antibiotics discontinued today.      She is due for her next session of plasmapheresis on 7/1.  Given her medical complexity, she will require direct admissions for planned plasmapheresis.  Neurology (Dr. Sellers) and Transfusion Medicine (Dr. García) are aware and in accordance with this plan.      Direct admissions are processed in the following manner:  (1) The office of the attending admitting the patient (Dr. Sellers) calls the Patient Transfer Desk providing the patient's name, diagnosis, date of request and any special needs. It is the responsibility of the office to obtain insurance authorization if needed.  (2) If the patient is in a facility, transportation arrangements are completed by the sending facility.  We provide direction to the sending facility of where the patient is being placed (i.e. bed number, nursing unit) as well as the contact information for hand off.  Ideally, sign-out between physician-to-physician should be started by the LTAC 24hours prior to admission.  (3) We also ensure our nursing unit staff are aware of the pt’s arrival date and provide the contact information for handoff.  (4) The Senior House Officer is also provided the information in case he/she has concerns/questions prior to the patient's arrival.    Maya Christian  [phone]

## 2019-06-14 LAB
CULTURE RESULTS: SIGNIFICANT CHANGE UP
SPECIMEN SOURCE: SIGNIFICANT CHANGE UP

## 2019-06-14 PROCEDURE — 99231 SBSQ HOSP IP/OBS SF/LOW 25: CPT

## 2019-06-14 PROCEDURE — 99232 SBSQ HOSP IP/OBS MODERATE 35: CPT | Mod: GC

## 2019-06-14 RX ORDER — I.V. FAT EMULSION 20 G/100ML
0.94 EMULSION INTRAVENOUS
Qty: 50 | Refills: 0 | Status: DISCONTINUED | OUTPATIENT
Start: 2019-06-14 | End: 2019-06-14

## 2019-06-14 RX ORDER — ELECTROLYTE SOLUTION,INJ
1 VIAL (ML) INTRAVENOUS
Refills: 0 | Status: DISCONTINUED | OUTPATIENT
Start: 2019-06-14 | End: 2019-06-15

## 2019-06-14 RX ADMIN — Medication 3 MILLILITER(S): at 05:17

## 2019-06-14 RX ADMIN — MIDODRINE HYDROCHLORIDE 10 MILLIGRAM(S): 2.5 TABLET ORAL at 12:15

## 2019-06-14 RX ADMIN — SODIUM CHLORIDE 3 MILLILITER(S): 9 INJECTION INTRAMUSCULAR; INTRAVENOUS; SUBCUTANEOUS at 10:13

## 2019-06-14 RX ADMIN — I.V. FAT EMULSION 20.83 GM/KG/DAY: 20 EMULSION INTRAVENOUS at 17:24

## 2019-06-14 RX ADMIN — SODIUM CHLORIDE 3 MILLILITER(S): 9 INJECTION INTRAMUSCULAR; INTRAVENOUS; SUBCUTANEOUS at 22:13

## 2019-06-14 RX ADMIN — CHLORHEXIDINE GLUCONATE 15 MILLILITER(S): 213 SOLUTION TOPICAL at 22:14

## 2019-06-14 RX ADMIN — MIDODRINE HYDROCHLORIDE 10 MILLIGRAM(S): 2.5 TABLET ORAL at 05:17

## 2019-06-14 RX ADMIN — NYSTATIN CREAM 1 APPLICATION(S): 100000 CREAM TOPICAL at 18:36

## 2019-06-14 RX ADMIN — NYSTATIN CREAM 1 APPLICATION(S): 100000 CREAM TOPICAL at 05:17

## 2019-06-14 RX ADMIN — SODIUM CHLORIDE 3 MILLILITER(S): 9 INJECTION INTRAMUSCULAR; INTRAVENOUS; SUBCUTANEOUS at 14:14

## 2019-06-14 RX ADMIN — FLUDROCORTISONE ACETATE 0.1 MILLIGRAM(S): 0.1 TABLET ORAL at 06:57

## 2019-06-14 RX ADMIN — SODIUM CHLORIDE 3 MILLILITER(S): 9 INJECTION INTRAMUSCULAR; INTRAVENOUS; SUBCUTANEOUS at 05:18

## 2019-06-14 RX ADMIN — CHLORHEXIDINE GLUCONATE 15 MILLILITER(S): 213 SOLUTION TOPICAL at 10:12

## 2019-06-14 RX ADMIN — Medication 3 MILLILITER(S): at 18:19

## 2019-06-14 RX ADMIN — CHLORHEXIDINE GLUCONATE 1 APPLICATION(S): 213 SOLUTION TOPICAL at 05:17

## 2019-06-14 RX ADMIN — Medication 3 MILLILITER(S): at 01:08

## 2019-06-14 RX ADMIN — INSULIN HUMAN 2: 100 INJECTION, SOLUTION SUBCUTANEOUS at 06:56

## 2019-06-14 RX ADMIN — SODIUM CHLORIDE 3 MILLILITER(S): 9 INJECTION INTRAMUSCULAR; INTRAVENOUS; SUBCUTANEOUS at 18:00

## 2019-06-14 RX ADMIN — SODIUM CHLORIDE 3 MILLILITER(S): 9 INJECTION INTRAMUSCULAR; INTRAVENOUS; SUBCUTANEOUS at 02:29

## 2019-06-14 RX ADMIN — Medication 3 MILLILITER(S): at 12:15

## 2019-06-14 RX ADMIN — ENOXAPARIN SODIUM 40 MILLIGRAM(S): 100 INJECTION SUBCUTANEOUS at 19:37

## 2019-06-14 RX ADMIN — MIDODRINE HYDROCHLORIDE 10 MILLIGRAM(S): 2.5 TABLET ORAL at 22:14

## 2019-06-14 NOTE — PROGRESS NOTE ADULT - SUBJECTIVE AND OBJECTIVE BOX
GYN Progress Note    Patient seen at bedside. Denies pain. Says that her mood is better.   Still NPO. Tolerating CPAP. Says that she thought she had leakage per vagina this morning, but has since had chux pad changed by nurse.    Denies CP, palpitations, SOB, fever, chills, nausea, vomiting.    Vital Signs Last 24 Hrs  T(C): 37.2 (14 Jun 2019 16:34), Max: 37.2 (14 Jun 2019 16:34)  T(F): 98.9 (14 Jun 2019 16:34), Max: 98.9 (14 Jun 2019 16:34)  HR: 72 (14 Jun 2019 16:34) (72 - 87)  BP: 135/84 (14 Jun 2019 16:34) (120/74 - 139/85)  BP(mean): --  RR: 18 (14 Jun 2019 16:34) (15 - 22)  SpO2: 100% (14 Jun 2019 16:34) (99% - 100%)    Physical Exam:  Gen: No Acute Distress  Pulm: Normal work of breathing, coarse breath sounds  GI: soft, nontender, distended (stable), gas in ostomy,  +BS, no rebound, no guarding. No stool noted on abraham pad near introitus  Ext: SCDs in place, wnl    I&O's Summary    13 Jun 2019 07:01  -  14 Jun 2019 07:00  --------------------------------------------------------  IN: 819 mL / OUT: 1450 mL / NET: -631 mL      MEDICATIONS  (STANDING):  albumin human  5% IVPB 2750 milliLiter(s) IV Intermittent once  albumin human  5% IVPB 2750 milliLiter(s) IV Intermittent once  ALBUTerol/ipratropium for Nebulization 3 milliLiter(s) Nebulizer every 6 hours  calcium gluconate IVPB 1 Gram(s) IV Intermittent once  calcium gluconate IVPB 1 Gram(s) IV Intermittent once  chlorhexidine 0.12% Liquid 15 milliLiter(s) Oral Mucosa every 12 hours  chlorhexidine 2% Cloths 1 Application(s) Topical <User Schedule>  dextrose 5%. 1000 milliLiter(s) (50 mL/Hr) IV Continuous <Continuous>  dextrose 50% Injectable 25 Gram(s) IV Push once  enoxaparin Injectable 40 milliGRAM(s) SubCutaneous every 24 hours  fat emulsion (Plant Based) 20% Infusion 0.938 Gm/kG/Day (20.832 mL/Hr) IV Continuous <Continuous>  fludroCORTISONE 0.1 milliGRAM(s) Oral every 24 hours  heparin 1000 units/mL 2000 Unit(s) 2000 Unit(s) IV Push once  heparin 1000units/mL 4000 Unit(s) 4000 Unit(s) IV Push once  insulin regular  human corrective regimen sliding scale   SubCutaneous every 6 hours  midodrine 10 milliGRAM(s) Oral every 8 hours  morphine  - Injectable 1 milliGRAM(s) IV Push once  morphine  - Injectable 2 milliGRAM(s) IV Push once  nystatin Cream 1 Application(s) Topical two times a day  Parenteral Nutrition - Adult 1 Each (63 mL/Hr) TPN Continuous <Continuous>  Parenteral Nutrition - Adult 1 Each (63 mL/Hr) TPN Continuous <Continuous>  sodium chloride 3%  Inhalation 3 milliLiter(s) Inhalation every 4 hours    MEDICATIONS  (PRN):  acetaminophen    Suspension .. 1000 milliGRAM(s) Oral every 8 hours PRN Moderate Pain (4 - 6)  dextrose 40% Gel 15 Gram(s) Oral once PRN Blood Glucose LESS THAN 70 milliGRAM(s)/deciliter  glucagon  Injectable 1 milliGRAM(s) IntraMuscular once PRN Glucose LESS THAN 70 milligrams/deciliter  lidocaine 2% Gel 1 Application(s) Topical two times a day PRN pain around PEG site  sodium chloride 0.9% lock flush 10 milliLiter(s) IV Push every 1 hour PRN Pre/post blood products, medications, blood draw, and to maintain line patency      LABS:                        7.9    7.92  )-----------( 402      ( 13 Jun 2019 07:54 )             25.8     06-13    139  |  101  |  26<H>  ----------------------------<  145<H>  3.5   |  25  |  0.51    Ca    9.4      13 Jun 2019 07:54  Mg     1.9     06-13

## 2019-06-14 NOTE — PROGRESS NOTE ADULT - ASSESSMENT
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  Hospital course notable for prolonged intubation and MICU admission, transitioned to tracheostomy and PEG tube on 5/21, now weened off pressors and transferred to chronic vent unit on 4Uris.   Plan was for transfer to LTACH, however unable to transfer due to lack of IV IG, thus in house for neurology recommendations for treatment. [facility] currently not an option due to patient's requirement for q2week plasmapheresis.   Patient has history of enterovaginal fistula which was previously treated conservatively and appeared to have resolved per imaging and resolution of leaking per vagina. Now patient found to have a recurrence of fistula, confirmed on CT scan on 6/5, managed conservatively w/ TPN and bowel rest.     -Fistula: manage conservatively w/ bowel rest. Continue TPN and holding tube feeds. Continue to monitor output per vagina. Primary team to consult general surgery prior to d/c to assess J-PEG site for leaking.   -: Neurogenic bladder w/ Indwelling joseph in place  When stable, consider urology consult for long term management i.e. for placement suprapubic catheter  - GYN malignancy:  s/p Anastrazole, s/p Megace 80mg BID x3 wks , s/p Tamoxifen 5/1-5/17 (stopped due to upper extremity DVT). pathology serous on review of pathology, when stable for genetic counselor, Once patient recovers from acute neurologic issue will reassess cancer treatment options, - ID: suspected tracheitis - now s/p zosyn and vancomycin. Currently tracking down initial pathology report from [facility] - pending in [facility] path lab  Will avoid immunotherapy at this time given associated risk of autoimmune disease   - Social work/palliative:  DNR/DNI. Interdisciplinary meeting was done on 5/23.

## 2019-06-14 NOTE — PROGRESS NOTE ADULT - SUBJECTIVE AND OBJECTIVE BOX
OVERNIGHT EVENTS: KESHAWN    SUBJECTIVE: Patient feels well today. Just a little itchiness under her chin from dry skin.    Vital Signs Last 12 Hrs  T(F): 98.8 (06-14-19 @ 05:01), Max: 98.8 (06-14-19 @ 05:01)  HR: 78 (06-14-19 @ 06:52) (78 - 87)  BP: 139/85 (06-14-19 @ 05:01) (139/85 - 139/85)  BP(mean): --  RR: 22 (06-14-19 @ 06:52) (20 - 22)  SpO2: 99% (06-14-19 @ 06:52) (99% - 100%)  I&O's Summary    13 Jun 2019 07:01  -  14 Jun 2019 07:00  --------------------------------------------------------  IN: 819 mL / OUT: 1450 mL / NET: -631 mL        PHYSICAL EXAM:  Constitutional: NAD, comfortable in bed.  HEENT: NC/AT, PERRLA, EOMI, no conjunctival pallor or scleral icterus, MMM  Neck: Supple, no JVD, tracheostomy in place without secretions surrounding 1 cm protruded  Respiratory: on cpap, lungs improved from prior, now clear  Cardiovascular: RRR, normal S1 and S2, no m/r/g.   Gastrointestinal: +BS, soft NTND, no guarding or rebound tenderness, no palpable masses, PEG in place - no signs of infection, colostomy in place w brown stool  : joseph in place  Extremities: wwp; no cyanosis, clubbing or edema.   Vascular: Pulses equal and strong throughout, R PICC line, R chest chemoport - accessed, L subclavian HD cath  Neurological: AAOx3, no CN deficits, strength 5/5 in UEs and 1/5 in LEs, sensation intact throughout.         LABS:                        7.9    7.92  )-----------( 402      ( 13 Jun 2019 07:54 )             25.8     06-13    139  |  101  |  26<H>  ----------------------------<  145<H>  3.5   |  25  |  0.51    Ca    9.4      13 Jun 2019 07:54  Phos  3.9     06-12  Mg     1.9     06-13            RADIOLOGY & ADDITIONAL TESTS:    MEDICATIONS  (STANDING):  albumin human  5% IVPB 2750 milliLiter(s) IV Intermittent once  albumin human  5% IVPB 2750 milliLiter(s) IV Intermittent once  ALBUTerol/ipratropium for Nebulization 3 milliLiter(s) Nebulizer every 6 hours  calcium gluconate IVPB 1 Gram(s) IV Intermittent once  calcium gluconate IVPB 1 Gram(s) IV Intermittent once  chlorhexidine 0.12% Liquid 15 milliLiter(s) Oral Mucosa every 12 hours  chlorhexidine 2% Cloths 1 Application(s) Topical <User Schedule>  dextrose 5%. 1000 milliLiter(s) (50 mL/Hr) IV Continuous <Continuous>  dextrose 50% Injectable 25 Gram(s) IV Push once  enoxaparin Injectable 40 milliGRAM(s) SubCutaneous every 24 hours  fludroCORTISONE 0.1 milliGRAM(s) Oral every 24 hours  heparin 1000 units/mL 2000 Unit(s) 2000 Unit(s) IV Push once  heparin 1000units/mL 4000 Unit(s) 4000 Unit(s) IV Push once  insulin regular  human corrective regimen sliding scale   SubCutaneous every 6 hours  midodrine 10 milliGRAM(s) Oral every 8 hours  morphine  - Injectable 1 milliGRAM(s) IV Push once  morphine  - Injectable 2 milliGRAM(s) IV Push once  nystatin Cream 1 Application(s) Topical two times a day  Parenteral Nutrition - Adult 1 Each (63 mL/Hr) TPN Continuous <Continuous>  sodium chloride 3%  Inhalation 3 milliLiter(s) Inhalation every 4 hours    MEDICATIONS  (PRN):  acetaminophen    Suspension .. 1000 milliGRAM(s) Oral every 8 hours PRN Moderate Pain (4 - 6)  dextrose 40% Gel 15 Gram(s) Oral once PRN Blood Glucose LESS THAN 70 milliGRAM(s)/deciliter  glucagon  Injectable 1 milliGRAM(s) IntraMuscular once PRN Glucose LESS THAN 70 milligrams/deciliter  lidocaine 2% Gel 1 Application(s) Topical two times a day PRN pain around PEG site  sodium chloride 0.9% lock flush 10 milliLiter(s) IV Push every 1 hour PRN Pre/post blood products, medications, blood draw, and to maintain line patency

## 2019-06-14 NOTE — PROGRESS NOTE ADULT - ASSESSMENT
60 yo F PMHx polio, breast cancer, DM, Endometrial Cancer s/p exploratory laparotomy, enterolysis, SHAMIR, BSO, pelvic lymphadenectomy, low anterior resection mobilization of splenic flexure, end colostomy on 7/30/18, rectovaginal fistula, numerous admissions for urinary tract infection presented to [facility] in the setting of urosepsis. Hospital course complicated by ESBL E coli bacteremia (completed ertapenem on 5/14 ) and respiratory failure requiring multiple intubations 2/2 AIDP, now s/p trach and PEG (5/21), s/p treatment for sepsis 2/2 MRSA PNA w/ linezolid. Now on vanco/zosyn for tracheitis.  Now s/p 2nd round of plasmapheresis. Now with re-opening of enteric-vaginal fistula, on strict NPO requiring TPN. Now stable for LTAC pending bed acceptance.

## 2019-06-14 NOTE — PROGRESS NOTE ADULT - PROBLEM SELECTOR PLAN 1
2/2 to demyelinating polyneuropathy (AIDP)   s/p trach (5/21) and s/p 4 doses of IVIG finished 5/11 and 5 rounds of plasmapheresis (finished 5/21), additional 2 rounds of plasmapheresis finished on 6/6  - C/w mechanical ventilation at night and cpap during day. wean as tolerated  - C/w frequent suctioning (q4h)  - pulm consult for increased secretions and possible mucus plugging - duonebs q6h standing and added vanc and zosyn for tracheitis (s/p 7 day course ended on 6/11)  - Per neurology, patient will require 2 rounds of plasmapheresis qMonthly for 3 months. Then neurology will re-evaluate need for more treatment.  - Plan for patient to return to [facility] on June 30th at noon for next round of plasmapheresis. LTAC physician should call to give signout to Dr. Sellers (admitting attending) on day prior to admission. Dr. Sellers can be reached at [phone].

## 2019-06-14 NOTE — PROGRESS NOTE ADULT - PROBLEM SELECTOR PLAN 8
Hx of urinary retention/overflow incontinence   - Joseph changed on 5/17   - ct abdomen shows mild L hydro w obstructing stone on 6/6  - per urology, continue w joseph  - ordered kidney and bladder US to eval for hydronephrosis    #PEG placement  - Pt s/p PEG placement 5/21   - flushes well per nursing. GI loosened bumper. 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 will discuss with GYN length of therapy.

## 2019-06-14 NOTE — PROGRESS NOTE ADULT - ATTENDING COMMENTS
Patient was seen and examined with the resident team today.  I agree with Dr. Vincent's assessment and plan with the following exceptions/additions:     This is a 58yo woman with a PMH of polio c/b post-polio syndrome, breast cancer, endometrial cancer s/p explap, enterolysis, SHAMIR, BSO, pelvic lymphadenectomy, low anterior resection mobilization of splenic flexure and end colostomy (7/30/18), rectovaginal fistula, as well as, numerous admissions for urinary tract infections who was initially admitted for ESBL E. coli bacteremia c/b septic shock, now s/p a protracted hospital course.  Admission has been complicated by acute (now chronic) hypercapnic respiratory failure requiring multiple intubations 2/2 AIDP (s/p IVIG and plasmapheresis), now s/p trach and PEG ( 5/21).  Latter part of hospitalization developed another episode of sepsis 2/2 MRSA HAP/VAP and VRE UTI.  Last day of antibiotics on 5/30.  Patient was nearing discharge on 5/31, but her discharge was delayed 2/2 difficult outpatient coordination of treatment.  She underwent plasmapheresis x2 the week of 6/31 without complication but did require 1U PRBC given her chronic anemia w/superimposed daily phlebotomy.  She was subsequently noted to have re-opening of her enterovaginal fistula, prompting initiation of TPN, as well as tracheitis (i/s/o a rapid response) resulting in initiation of Vancomycin.  No longer on antibiotics.  She is now medically stable for discharge.      She is due for her next session of plasmapheresis on 7/1.  Given her medical complexity, she will require direct admissions for planned plasmapheresis.  Neurology (Dr. Sellers) and Transfusion Medicine (Dr. García) are aware and in accordance with this plan.      Direct admissions are processed in the following manner:  (1) The office of the attending admitting the patient (Dr. Sellers) calls the Patient Transfer Desk providing the patient's name, diagnosis, date of request and any special needs. It is the responsibility of the office to obtain insurance authorization if needed.  (2) If the patient is in a facility, transportation arrangements are completed by the sending facility.  We provide direction to the sending facility of where the patient is being placed (i.e. bed number, nursing unit) as well as the contact information for hand off.  Ideally, sign-out between physician-to-physician should be started by the sending facility 24hours prior to admission.  (3) We also ensure our nursing unit staff are aware of the pt’s arrival date and provide the contact information for handoff.  (4) The Senior House Officer is also provided the information in case he/she has concerns/questions prior to the patient's arrival.    Maya Christian  [phone]

## 2019-06-15 LAB
ANION GAP SERPL CALC-SCNC: 13 MMOL/L — SIGNIFICANT CHANGE UP (ref 5–17)
BUN SERPL-MCNC: 32 MG/DL — HIGH (ref 7–23)
CALCIUM SERPL-MCNC: 9.9 MG/DL — SIGNIFICANT CHANGE UP (ref 8.4–10.5)
CHLORIDE SERPL-SCNC: 97 MMOL/L — SIGNIFICANT CHANGE UP (ref 96–108)
CO2 SERPL-SCNC: 25 MMOL/L — SIGNIFICANT CHANGE UP (ref 22–31)
CREAT SERPL-MCNC: 0.62 MG/DL — SIGNIFICANT CHANGE UP (ref 0.5–1.3)
GLUCOSE SERPL-MCNC: 160 MG/DL — HIGH (ref 70–99)
HCT VFR BLD CALC: 24.3 % — LOW (ref 34.5–45)
HGB BLD-MCNC: 7.7 G/DL — LOW (ref 11.5–15.5)
MAGNESIUM SERPL-MCNC: 2 MG/DL — SIGNIFICANT CHANGE UP (ref 1.6–2.6)
MCHC RBC-ENTMCNC: 29.4 PG — SIGNIFICANT CHANGE UP (ref 27–34)
MCHC RBC-ENTMCNC: 31.7 GM/DL — LOW (ref 32–36)
MCV RBC AUTO: 92.7 FL — SIGNIFICANT CHANGE UP (ref 80–100)
NRBC # BLD: 0 /100 WBCS — SIGNIFICANT CHANGE UP (ref 0–0)
PHOSPHATE SERPL-MCNC: 3.9 MG/DL — SIGNIFICANT CHANGE UP (ref 2.5–4.5)
PLATELET # BLD AUTO: 360 K/UL — SIGNIFICANT CHANGE UP (ref 150–400)
POTASSIUM SERPL-MCNC: 3.9 MMOL/L — SIGNIFICANT CHANGE UP (ref 3.5–5.3)
POTASSIUM SERPL-SCNC: 3.9 MMOL/L — SIGNIFICANT CHANGE UP (ref 3.5–5.3)
RBC # BLD: 2.62 M/UL — LOW (ref 3.8–5.2)
RBC # FLD: 15.6 % — HIGH (ref 10.3–14.5)
SODIUM SERPL-SCNC: 135 MMOL/L — SIGNIFICANT CHANGE UP (ref 135–145)
WBC # BLD: 8.26 K/UL — SIGNIFICANT CHANGE UP (ref 3.8–10.5)
WBC # FLD AUTO: 8.26 K/UL — SIGNIFICANT CHANGE UP (ref 3.8–10.5)

## 2019-06-15 PROCEDURE — 76770 US EXAM ABDO BACK WALL COMP: CPT | Mod: 26

## 2019-06-15 PROCEDURE — 99232 SBSQ HOSP IP/OBS MODERATE 35: CPT | Mod: GC

## 2019-06-15 RX ORDER — ELECTROLYTE SOLUTION,INJ
1 VIAL (ML) INTRAVENOUS
Refills: 0 | Status: DISCONTINUED | OUTPATIENT
Start: 2019-06-15 | End: 2019-06-15

## 2019-06-15 RX ORDER — I.V. FAT EMULSION 20 G/100ML
0.94 EMULSION INTRAVENOUS
Qty: 50 | Refills: 0 | Status: DISCONTINUED | OUTPATIENT
Start: 2019-06-15 | End: 2019-06-15

## 2019-06-15 RX ADMIN — Medication 3 MILLILITER(S): at 06:59

## 2019-06-15 RX ADMIN — Medication 3 MILLILITER(S): at 18:49

## 2019-06-15 RX ADMIN — SODIUM CHLORIDE 3 MILLILITER(S): 9 INJECTION INTRAMUSCULAR; INTRAVENOUS; SUBCUTANEOUS at 06:59

## 2019-06-15 RX ADMIN — CHLORHEXIDINE GLUCONATE 1 APPLICATION(S): 213 SOLUTION TOPICAL at 07:00

## 2019-06-15 RX ADMIN — NYSTATIN CREAM 1 APPLICATION(S): 100000 CREAM TOPICAL at 07:00

## 2019-06-15 RX ADMIN — SODIUM CHLORIDE 3 MILLILITER(S): 9 INJECTION INTRAMUSCULAR; INTRAVENOUS; SUBCUTANEOUS at 13:55

## 2019-06-15 RX ADMIN — SODIUM CHLORIDE 3 MILLILITER(S): 9 INJECTION INTRAMUSCULAR; INTRAVENOUS; SUBCUTANEOUS at 10:53

## 2019-06-15 RX ADMIN — SODIUM CHLORIDE 3 MILLILITER(S): 9 INJECTION INTRAMUSCULAR; INTRAVENOUS; SUBCUTANEOUS at 23:29

## 2019-06-15 RX ADMIN — ENOXAPARIN SODIUM 40 MILLIGRAM(S): 100 INJECTION SUBCUTANEOUS at 19:27

## 2019-06-15 RX ADMIN — Medication 3 MILLILITER(S): at 01:08

## 2019-06-15 RX ADMIN — MIDODRINE HYDROCHLORIDE 10 MILLIGRAM(S): 2.5 TABLET ORAL at 06:59

## 2019-06-15 RX ADMIN — Medication 3 MILLILITER(S): at 23:29

## 2019-06-15 RX ADMIN — Medication 3 MILLILITER(S): at 12:12

## 2019-06-15 RX ADMIN — FLUDROCORTISONE ACETATE 0.1 MILLIGRAM(S): 0.1 TABLET ORAL at 06:59

## 2019-06-15 RX ADMIN — SODIUM CHLORIDE 3 MILLILITER(S): 9 INJECTION INTRAMUSCULAR; INTRAVENOUS; SUBCUTANEOUS at 01:08

## 2019-06-15 RX ADMIN — MIDODRINE HYDROCHLORIDE 10 MILLIGRAM(S): 2.5 TABLET ORAL at 14:00

## 2019-06-15 RX ADMIN — CHLORHEXIDINE GLUCONATE 15 MILLILITER(S): 213 SOLUTION TOPICAL at 10:53

## 2019-06-15 RX ADMIN — INSULIN HUMAN 2: 100 INJECTION, SOLUTION SUBCUTANEOUS at 07:59

## 2019-06-15 RX ADMIN — I.V. FAT EMULSION 20.83 GM/KG/DAY: 20 EMULSION INTRAVENOUS at 18:49

## 2019-06-15 RX ADMIN — Medication 1000 MILLIGRAM(S): at 20:32

## 2019-06-15 RX ADMIN — SODIUM CHLORIDE 3 MILLILITER(S): 9 INJECTION INTRAMUSCULAR; INTRAVENOUS; SUBCUTANEOUS at 18:49

## 2019-06-15 RX ADMIN — Medication 1000 MILLIGRAM(S): at 02:25

## 2019-06-15 RX ADMIN — Medication 1 EACH: at 18:49

## 2019-06-15 RX ADMIN — MIDODRINE HYDROCHLORIDE 10 MILLIGRAM(S): 2.5 TABLET ORAL at 23:30

## 2019-06-15 RX ADMIN — CHLORHEXIDINE GLUCONATE 15 MILLILITER(S): 213 SOLUTION TOPICAL at 23:29

## 2019-06-15 RX ADMIN — Medication 1000 MILLIGRAM(S): at 21:30

## 2019-06-15 RX ADMIN — Medication 1000 MILLIGRAM(S): at 01:21

## 2019-06-15 RX ADMIN — Medication 63 EACH: at 01:06

## 2019-06-15 NOTE — PROGRESS NOTE ADULT - SUBJECTIVE AND OBJECTIVE BOX
Pt seen and examined at bedside. Her only complaint this morning is skin itching.  She denies any leakage per vagina.  She is NPO.  Denies any pain.    T(F): 98 (06-15-19 @ 05:29), Max: 99.1 (06-14-19 @ 21:00)  HR: 73 (06-15-19 @ 05:29) (72 - 88)  BP: 116/75 (06-15-19 @ 05:29) (116/75 - 136/83)  RR: 17 (06-15-19 @ 05:29) (16 - 22)  SpO2: 100% (06-15-19 @ 05:29) (100% - 100%)  Wt(kg): --  I&O's Summary    14 Jun 2019 07:01  -  15 Román 2019 07:00  --------------------------------------------------------  IN: 1782.4 mL / OUT: 2375 mL / NET: -592.6 mL        MEDICATIONS  (STANDING):  albumin human  5% IVPB 2750 milliLiter(s) IV Intermittent once  albumin human  5% IVPB 2750 milliLiter(s) IV Intermittent once  ALBUTerol/ipratropium for Nebulization 3 milliLiter(s) Nebulizer every 6 hours  calcium gluconate IVPB 1 Gram(s) IV Intermittent once  calcium gluconate IVPB 1 Gram(s) IV Intermittent once  chlorhexidine 0.12% Liquid 15 milliLiter(s) Oral Mucosa every 12 hours  chlorhexidine 2% Cloths 1 Application(s) Topical <User Schedule>  dextrose 5%. 1000 milliLiter(s) (50 mL/Hr) IV Continuous <Continuous>  dextrose 50% Injectable 25 Gram(s) IV Push once  enoxaparin Injectable 40 milliGRAM(s) SubCutaneous every 24 hours  fat emulsion (Plant Based) 20% Infusion 0.938 Gm/kG/Day (20.832 mL/Hr) IV Continuous <Continuous>  fludroCORTISONE 0.1 milliGRAM(s) Oral every 24 hours  heparin 1000 units/mL 2000 Unit(s) 2000 Unit(s) IV Push once  heparin 1000units/mL 4000 Unit(s) 4000 Unit(s) IV Push once  insulin regular  human corrective regimen sliding scale   SubCutaneous every 6 hours  midodrine 10 milliGRAM(s) Oral every 8 hours  morphine  - Injectable 1 milliGRAM(s) IV Push once  morphine  - Injectable 2 milliGRAM(s) IV Push once  nystatin Cream 1 Application(s) Topical two times a day  Parenteral Nutrition - Adult 1 Each (63 mL/Hr) TPN Continuous <Continuous>  sodium chloride 3%  Inhalation 3 milliLiter(s) Inhalation every 4 hours    MEDICATIONS  (PRN):  acetaminophen    Suspension .. 1000 milliGRAM(s) Oral every 8 hours PRN Moderate Pain (4 - 6)  dextrose 40% Gel 15 Gram(s) Oral once PRN Blood Glucose LESS THAN 70 milliGRAM(s)/deciliter  glucagon  Injectable 1 milliGRAM(s) IntraMuscular once PRN Glucose LESS THAN 70 milligrams/deciliter  lidocaine 2% Gel 1 Application(s) Topical two times a day PRN pain around PEG site  sodium chloride 0.9% lock flush 10 milliLiter(s) IV Push every 1 hour PRN Pre/post blood products, medications, blood draw, and to maintain line patency      Physical Exam:  Constitutional: NAD  Pulmonary: during evaluation, cap from trach fell off and patient became anxious, however cap was replaced and she became comfortable and was breathing comfortably  Abdomen: soft, nontender, distended (stable), gas in ostomy,  +BS, no rebound, no guarding. No stool noted on abraham pad near introitus  Ext: SCDs in place, wnl    LABS:                        7.7    8.26  )-----------( 360      ( 15 Román 2019 06:14 )             24.3     06-15    135  |  97  |  32<H>  ----------------------------<  160<H>  3.9   |  25  |  0.62    Ca    9.9      15 Román 2019 06:14  Phos  3.9     06-15  Mg     2.0     06-15

## 2019-06-15 NOTE — PROGRESS NOTE ADULT - ASSESSMENT
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  Hospital course notable for prolonged intubation and MICU admission, transitioned to tracheostomy and PEG tube on 5/21, now weened off pressors and transferred to chronic vent unit on [facility].   Plan was for transfer to [facility], however unable to transfer due to lack of IV IG, thus in house for neurology recommendations for treatment. [facility] currently not an option due to patient's requirement for q2week plasmapheresis.   Patient has history of enterovaginal fistula which was previously treated conservatively and appeared to have resolved per imaging and resolution of leaking per vagina. Now patient found to have a recurrence of fistula, confirmed on CT scan on 6/5, managed conservatively w/ TPN and bowel rest.     -Fistula: manage conservatively w/ bowel rest. Continue TPN and holding tube feeds. Continue to monitor output per vagina. Primary team to consult general surgery prior to d/c to assess J-PEG site for leaking.   -: Neurogenic bladder w/ Indwelling joseph in place  When stable, consider urology consult for long term management i.e. for placement suprapubic catheter  - GYN malignancy:  s/p Anastrazole, s/p Megace 80mg BID x3 wks , s/p Tamoxifen 5/1-5/17 (stopped due to upper extremity DVT). pathology serous on review of pathology, when stable for genetic counselor, Once patient recovers from acute neurologic issue will reassess cancer treatment options, - ID: suspected tracheitis - now s/p zosyn and vancomycin. Currently tracking down initial pathology report from [facility] - pending in [facility] path lab  Will avoid immunotherapy at this time given associated risk of autoimmune disease   - Social work/palliative:  DNR/DNI. Interdisciplinary meeting was done on 5/23.

## 2019-06-15 NOTE — PROGRESS NOTE ADULT - SUBJECTIVE AND OBJECTIVE BOX
INTERVAL EVENTS:  -- NAEO    SUBJECTIVE:  -- no complaints other than chronic cough that is unchanged   -- denies fevers, chills, CP, SOB, pain    MEDICATIONS:  MEDICATIONS  (STANDING):  albumin human  5% IVPB 2750 milliLiter(s) IV Intermittent once  albumin human  5% IVPB 2750 milliLiter(s) IV Intermittent once  ALBUTerol/ipratropium for Nebulization 3 milliLiter(s) Nebulizer every 6 hours  calcium gluconate IVPB 1 Gram(s) IV Intermittent once  calcium gluconate IVPB 1 Gram(s) IV Intermittent once  chlorhexidine 0.12% Liquid 15 milliLiter(s) Oral Mucosa every 12 hours  chlorhexidine 2% Cloths 1 Application(s) Topical <User Schedule>  dextrose 5%. 1000 milliLiter(s) (50 mL/Hr) IV Continuous <Continuous>  dextrose 50% Injectable 25 Gram(s) IV Push once  enoxaparin Injectable 40 milliGRAM(s) SubCutaneous every 24 hours  fat emulsion (Plant Based) 20% Infusion 0.939 Gm/kG/Day (20.83 mL/Hr) IV Continuous <Continuous>  fludroCORTISONE 0.1 milliGRAM(s) Oral every 24 hours  heparin 1000 units/mL 2000 Unit(s) 2000 Unit(s) IV Push once  heparin 1000units/mL 4000 Unit(s) 4000 Unit(s) IV Push once  insulin regular  human corrective regimen sliding scale   SubCutaneous every 6 hours  midodrine 10 milliGRAM(s) Oral every 8 hours  morphine  - Injectable 1 milliGRAM(s) IV Push once  morphine  - Injectable 2 milliGRAM(s) IV Push once  nystatin Cream 1 Application(s) Topical two times a day  Parenteral Nutrition - Adult 1 Each (63 mL/Hr) TPN Continuous <Continuous>  Parenteral Nutrition - Adult 1 Each (63 mL/Hr) TPN Continuous <Continuous>  sodium chloride 3%  Inhalation 3 milliLiter(s) Inhalation every 4 hours    MEDICATIONS  (PRN):  acetaminophen    Suspension .. 1000 milliGRAM(s) Oral every 8 hours PRN Moderate Pain (4 - 6)  dextrose 40% Gel 15 Gram(s) Oral once PRN Blood Glucose LESS THAN 70 milliGRAM(s)/deciliter  glucagon  Injectable 1 milliGRAM(s) IntraMuscular once PRN Glucose LESS THAN 70 milligrams/deciliter  lidocaine 2% Gel 1 Application(s) Topical two times a day PRN pain around PEG site  sodium chloride 0.9% lock flush 10 milliLiter(s) IV Push every 1 hour PRN Pre/post blood products, medications, blood draw, and to maintain line patency      Allergies  -- No Known Allergies    Intolerances  -- IVIG PRODUCT IS PRIGIVEN OR GAMMUNEX (Unknown)  PeriGuard (Pruritus (Mild to Mod))      OBJECTIVE:  Vital Signs Last 24 Hrs  T(C): 36.8 (15 Román 2019 09:44), Max: 37.3 (14 Jun 2019 21:00)  T(F): 98.3 (15 Román 2019 09:44), Max: 99.1 (14 Jun 2019 21:00)  HR: 84 (15 Román 2019 13:20) (72 - 88)  BP: 141/85 (15 Román 2019 09:44) (116/75 - 141/85)  BP(mean): --  RR: 18 (15 Román 2019 10:05) (17 - 22)  SpO2: 100% (15 Román 2019 13:20) (99% - 100%)  I&O's Summary    14 Jun 2019 07:01  -  15 Román 2019 07:00  --------------------------------------------------------  IN: 1782.4 mL / OUT: 2375 mL / NET: -592.6 mL    15 Román 2019 07:01  -  15 Román 2019 15:26  --------------------------------------------------------  IN: 0 mL / OUT: 825 mL / NET: -825 mL    PHYSICAL EXAM:  Gen: NAD, lying in bed comfortably  HEENT: NCAT, MMM, clear OP  Neck: trach at midline c/d/i  CV: RRR, no m/g/r appreciated  Pulm: mild rhonchi anteriorly w/o increase in WOB  Abd: normoactive BS, soft, NTND; +PEG and colostomy   Ext: WWP, 2+ pulses x4; no c/e/e w/diffusely flaccid BLE  Neuro: AOx3, unchanged from baseline - 1/5 BLE and 4-5/5 BUE  Psych: pleasant and appropriate    LABS:                        7.7    8.26  )-----------( 360      ( 15 Román 2019 06:14 )             24.3     06-15    135  |  97  |  32<H>  ----------------------------<  160<H>  3.9   |  25  |  0.62    Ca    9.9      15 Román 2019 06:14  Phos  3.9     06-15  Mg     2.0     06-15    CAPILLARY BLOOD GLUCOSE  POCT Blood Glucose.: 119 mg/dL (15 Román 2019 12:09)  POCT Blood Glucose.: 154 mg/dL (15 Román 2019 07:58)  POCT Blood Glucose.: 151 mg/dL (15 Román 2019 06:37)  POCT Blood Glucose.: 146 mg/dL (14 Jun 2019 23:55)  POCT Blood Glucose.: 113 mg/dL (14 Jun 2019 17:32)    MICRODATA:  -- No new data.     RADIOLOGY/OTHER STUDIES:  -- No new imaging.

## 2019-06-15 NOTE — PROGRESS NOTE ADULT - ASSESSMENT
58 yo F PMHx polio, breast cancer, DM, Endometrial Cancer s/p exploratory laparotomy, enterolysis, SHAMIR, BSO, pelvic lymphadenectomy, low anterior resection mobilization of splenic flexure, end colostomy on 7/30/18, rectovaginal fistula, numerous admissions for urinary tract infection presented to [facility] in the setting of urosepsis. Hospital course complicated by ESBL E coli bacteremia (completed ertapenem on 5/14 ) and respiratory failure requiring multiple intubations 2/2 AIDP, now s/p trach and PEG (5/21), s/p treatment for sepsis 2/2 MRSA PNA w/ linezolid. Now on vanco/zosyn for tracheitis.  Now s/p 2nd round of plasmapheresis. Now with re-opening of enteric-vaginal fistula, on strict NPO requiring TPN. Now stable for LTAC pending bed acceptance.

## 2019-06-15 NOTE — PROGRESS NOTE ADULT - PROBLEM SELECTOR PLAN 1
2/2 to demyelinating polyneuropathy (AIDP)   s/p trach (5/21) and s/p 4 doses of IVIG finished 5/11 and 5 rounds of plasmapheresis (finished 5/21), additional 2 rounds of plasmapheresis finished on 6/6  - C/w mechanical ventilation at night and cpap during day. wean as tolerated  - C/w frequent suctioning (q4h)  - pulm consult for increased secretions and possible mucus plugging - duonebs q6h standing and added vanc and zosyn for tracheitis (s/p 7 day course ended on 6/11)  - Per neurology, patient will require 2 rounds of plasmapheresis qMonthly for 3 months. Then neurology will re-evaluate need for more treatment.  - Plan for patient to return to [facility] on June 30th at noon for next round of plasmapheresis. LTAC physician should call to give signout to Dr. Sellers (admitting attending) on day prior to admission. Dr. Sellers can be reached at [phone].

## 2019-06-16 LAB
ANION GAP SERPL CALC-SCNC: 14 MMOL/L — SIGNIFICANT CHANGE UP (ref 5–17)
BUN SERPL-MCNC: 34 MG/DL — HIGH (ref 7–23)
CALCIUM SERPL-MCNC: 10 MG/DL — SIGNIFICANT CHANGE UP (ref 8.4–10.5)
CHLORIDE SERPL-SCNC: 98 MMOL/L — SIGNIFICANT CHANGE UP (ref 96–108)
CO2 SERPL-SCNC: 24 MMOL/L — SIGNIFICANT CHANGE UP (ref 22–31)
CREAT SERPL-MCNC: 0.58 MG/DL — SIGNIFICANT CHANGE UP (ref 0.5–1.3)
GLUCOSE SERPL-MCNC: 162 MG/DL — HIGH (ref 70–99)
HCT VFR BLD CALC: 25.8 % — LOW (ref 34.5–45)
HGB BLD-MCNC: 8 G/DL — LOW (ref 11.5–15.5)
MAGNESIUM SERPL-MCNC: 2 MG/DL — SIGNIFICANT CHANGE UP (ref 1.6–2.6)
MCHC RBC-ENTMCNC: 29.1 PG — SIGNIFICANT CHANGE UP (ref 27–34)
MCHC RBC-ENTMCNC: 31 GM/DL — LOW (ref 32–36)
MCV RBC AUTO: 93.8 FL — SIGNIFICANT CHANGE UP (ref 80–100)
NRBC # BLD: 0 /100 WBCS — SIGNIFICANT CHANGE UP (ref 0–0)
PHOSPHATE SERPL-MCNC: 4.2 MG/DL — SIGNIFICANT CHANGE UP (ref 2.5–4.5)
PLATELET # BLD AUTO: 372 K/UL — SIGNIFICANT CHANGE UP (ref 150–400)
POTASSIUM SERPL-MCNC: 4.1 MMOL/L — SIGNIFICANT CHANGE UP (ref 3.5–5.3)
POTASSIUM SERPL-SCNC: 4.1 MMOL/L — SIGNIFICANT CHANGE UP (ref 3.5–5.3)
RBC # BLD: 2.75 M/UL — LOW (ref 3.8–5.2)
RBC # FLD: 15.8 % — HIGH (ref 10.3–14.5)
SODIUM SERPL-SCNC: 136 MMOL/L — SIGNIFICANT CHANGE UP (ref 135–145)
WBC # BLD: 9.51 K/UL — SIGNIFICANT CHANGE UP (ref 3.8–10.5)
WBC # FLD AUTO: 9.51 K/UL — SIGNIFICANT CHANGE UP (ref 3.8–10.5)

## 2019-06-16 PROCEDURE — 99232 SBSQ HOSP IP/OBS MODERATE 35: CPT | Mod: GC

## 2019-06-16 RX ORDER — ONDANSETRON 8 MG/1
4 TABLET, FILM COATED ORAL EVERY 6 HOURS
Refills: 0 | Status: DISCONTINUED | OUTPATIENT
Start: 2019-06-16 | End: 2019-06-19

## 2019-06-16 RX ORDER — MIDODRINE HYDROCHLORIDE 2.5 MG/1
5 TABLET ORAL EVERY 8 HOURS
Refills: 0 | Status: DISCONTINUED | OUTPATIENT
Start: 2019-06-16 | End: 2019-06-19

## 2019-06-16 RX ORDER — ELECTROLYTE SOLUTION,INJ
1 VIAL (ML) INTRAVENOUS
Refills: 0 | Status: DISCONTINUED | OUTPATIENT
Start: 2019-06-16 | End: 2019-06-16

## 2019-06-16 RX ORDER — ONDANSETRON 8 MG/1
4 TABLET, FILM COATED ORAL ONCE
Refills: 0 | Status: COMPLETED | OUTPATIENT
Start: 2019-06-16 | End: 2019-06-16

## 2019-06-16 RX ADMIN — Medication 3 MILLILITER(S): at 12:44

## 2019-06-16 RX ADMIN — Medication 1 EACH: at 18:07

## 2019-06-16 RX ADMIN — ONDANSETRON 4 MILLIGRAM(S): 8 TABLET, FILM COATED ORAL at 10:26

## 2019-06-16 RX ADMIN — MIDODRINE HYDROCHLORIDE 5 MILLIGRAM(S): 2.5 TABLET ORAL at 14:25

## 2019-06-16 RX ADMIN — SODIUM CHLORIDE 3 MILLILITER(S): 9 INJECTION INTRAMUSCULAR; INTRAVENOUS; SUBCUTANEOUS at 03:08

## 2019-06-16 RX ADMIN — SODIUM CHLORIDE 3 MILLILITER(S): 9 INJECTION INTRAMUSCULAR; INTRAVENOUS; SUBCUTANEOUS at 10:00

## 2019-06-16 RX ADMIN — INSULIN HUMAN 2: 100 INJECTION, SOLUTION SUBCUTANEOUS at 06:42

## 2019-06-16 RX ADMIN — NYSTATIN CREAM 1 APPLICATION(S): 100000 CREAM TOPICAL at 06:40

## 2019-06-16 RX ADMIN — SODIUM CHLORIDE 3 MILLILITER(S): 9 INJECTION INTRAMUSCULAR; INTRAVENOUS; SUBCUTANEOUS at 17:22

## 2019-06-16 RX ADMIN — Medication 3 MILLILITER(S): at 17:22

## 2019-06-16 RX ADMIN — MIDODRINE HYDROCHLORIDE 5 MILLIGRAM(S): 2.5 TABLET ORAL at 21:25

## 2019-06-16 RX ADMIN — Medication 1000 MILLIGRAM(S): at 21:24

## 2019-06-16 RX ADMIN — NYSTATIN CREAM 1 APPLICATION(S): 100000 CREAM TOPICAL at 17:23

## 2019-06-16 RX ADMIN — Medication 1000 MILLIGRAM(S): at 22:30

## 2019-06-16 RX ADMIN — MIDODRINE HYDROCHLORIDE 10 MILLIGRAM(S): 2.5 TABLET ORAL at 06:41

## 2019-06-16 RX ADMIN — CHLORHEXIDINE GLUCONATE 15 MILLILITER(S): 213 SOLUTION TOPICAL at 21:25

## 2019-06-16 RX ADMIN — SODIUM CHLORIDE 3 MILLILITER(S): 9 INJECTION INTRAMUSCULAR; INTRAVENOUS; SUBCUTANEOUS at 21:25

## 2019-06-16 RX ADMIN — Medication 3 MILLILITER(S): at 06:40

## 2019-06-16 RX ADMIN — SODIUM CHLORIDE 3 MILLILITER(S): 9 INJECTION INTRAMUSCULAR; INTRAVENOUS; SUBCUTANEOUS at 14:29

## 2019-06-16 RX ADMIN — CHLORHEXIDINE GLUCONATE 15 MILLILITER(S): 213 SOLUTION TOPICAL at 10:26

## 2019-06-16 RX ADMIN — ENOXAPARIN SODIUM 40 MILLIGRAM(S): 100 INJECTION SUBCUTANEOUS at 19:11

## 2019-06-16 RX ADMIN — CHLORHEXIDINE GLUCONATE 1 APPLICATION(S): 213 SOLUTION TOPICAL at 06:41

## 2019-06-16 RX ADMIN — FLUDROCORTISONE ACETATE 0.1 MILLIGRAM(S): 0.1 TABLET ORAL at 06:42

## 2019-06-16 RX ADMIN — SODIUM CHLORIDE 3 MILLILITER(S): 9 INJECTION INTRAMUSCULAR; INTRAVENOUS; SUBCUTANEOUS at 06:42

## 2019-06-16 NOTE — PROGRESS NOTE ADULT - PROBLEM SELECTOR PLAN 1
2/2 to demyelinating polyneuropathy (AIDP)   s/p trach (5/21) and s/p 4 doses of IVIG finished 5/11 and 5 rounds of plasmapheresis (finished 5/21), additional 2 rounds of plasmapheresis finished on 6/6  - C/w mechanical ventilation at night and cpap during day. wean as tolerated  - C/w frequent suctioning (q4h)  - pulm consult for increased secretions and possible mucus plugging - duonebs q6h standing and added vanc and zosyn for tracheitis (s/p 7 day course ended on 6/11)  - Per neurology, patient will require 2 rounds of plasmapheresis qMonthly for 3 months. Then neurology will re-evaluate need for more treatment.  - Plan for patient to return to [facility] on June 30th at noon for next round of plasmapheresis. [facility] physician should call to give signout to Dr. Sellers (admitting attending) on day prior to admission. Dr. Sellers can be reached at [phone].

## 2019-06-16 NOTE — PROGRESS NOTE ADULT - SUBJECTIVE AND OBJECTIVE BOX
GYN PROGRESS NOTE    Patient evaluated at the bedside. No acute events. Patient reports some nausea this morning.   Denies CP/SOB/dizziness/vomiting/abdominal pain/calf pain. Patient put on CPAP this morning. No needs at this time.       O:   T(C): 36.9 (06-16-19 @ 05:13), Max: 37.2 (06-15-19 @ 16:00)  HR: 81 (06-16-19 @ 05:13) (72 - 84)  BP: 124/81 (06-16-19 @ 05:13) (124/78 - 158/73)  RR: 18 (06-16-19 @ 06:35) (17 - 20)  SpO2: 100% (06-16-19 @ 06:35) (99% - 100%)  Wt(kg): --    GEN: patient appears comfortable   LUNGS: no respiratory distress  ABD: soft, nontender, liquid stool in ostomy bag   EXT: no calf tenderness, SCDs in place         06-15 @ 07:01  -  06-16 @ 07:00  --------------------------------------------------------  IN: 0 mL / OUT: 2100 mL / NET: -2100 mL

## 2019-06-16 NOTE — PROGRESS NOTE ADULT - ASSESSMENT
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  Hospital course notable for prolonged intubation and MICU admission, transitioned to tracheostomy and PEG tube on 5/21, now weened off pressors and transferred to chronic vent unit on [facility].   [facility], however unable to transfer due to lack of IV IG, thus in house for neurology recommendations for treatment. [facility] currently not an option due to patient's requirement for q2week plasmapheresis.   Patient has history of enterovaginal fistula which was previously treated conservatively and appeared to have resolved per imaging and resolution of leaking per vagina. Now patient found to have a recurrence of fistula, confirmed on CT scan on 6/5, managed conservatively w/ TPN and bowel rest.     Primary management per Medicine.     -Fistula: manage conservatively w/ bowel rest. Continue TPN and holding tube feeds. Continue to monitor output per vagina.   -: Neurogenic bladder w/ Indwelling joseph in place  - GYN malignancy:  s/p Anastrazole, s/p Megace 80mg BID x3 wks , s/p Tamoxifen 5/1-5/17 (stopped due to upper extremity DVT). pathology serous on review of pathology, when stable for genetic counselor, Once patient recovers from acute neurologic issue will reassess cancer treatment options, - ID: suspected tracheitis - now s/p zosyn and vancomycin. Currently tracking down initial pathology report from [facility] - pending in [facility] path lab  Will avoid immunotherapy at this time given associated risk of autoimmune disease   - Social work/palliative:  DNR/DNI. Interdisciplinary meeting was done on 5/23.

## 2019-06-16 NOTE — PROGRESS NOTE ADULT - SUBJECTIVE AND OBJECTIVE BOX
OVERNIGHT EVENTS: KESHAWN    SUBJECTIVE:    Vital Signs Last 12 Hrs  T(F): 98.4 (06-16-19 @ 05:13), Max: 98.4 (06-16-19 @ 05:13)  HR: 81 (06-16-19 @ 05:13) (80 - 82)  BP: 124/81 (06-16-19 @ 05:13) (124/78 - 124/81)  BP(mean): --  RR: 18 (06-16-19 @ 05:13) (18 - 20)  SpO2: 100% (06-16-19 @ 05:13) (100% - 100%)  I&O's Summary    14 Jun 2019 07:01  -  15 Román 2019 07:00  --------------------------------------------------------  IN: 1782.4 mL / OUT: 2375 mL / NET: -592.6 mL    15 Román 2019 07:01  -  16 Jun 2019 06:33  --------------------------------------------------------  IN: 0 mL / OUT: 1450 mL / NET: -1450 mL        PHYSICAL EXAM:  Constitutional: NAD, comfortable in bed.  HEENT: NC/AT, PERRLA, EOMI, no conjunctival pallor or scleral icterus, MMM  Neck: Supple, no JVD  Respiratory: Normal rate, rhythm, depth, effort. CTAB. No w/r/r.   Cardiovascular: RRR, normal S1 and S2, no m/r/g.   Gastrointestinal: +BS, soft NTND, no guarding or rebound tenderness, no palpable masses   Extremities: wwp; no cyanosis, clubbing or edema.   Vascular: Pulses equal and strong throughout.   Neurological: AAOx3, no CN deficits, strength and sensation intact throughout.   Skin: No gross skin abnormalities or rashes        LABS:                        7.7    8.26  )-----------( 360      ( 15 Román 2019 06:14 )             24.3     06-15    135  |  97  |  32<H>  ----------------------------<  160<H>  3.9   |  25  |  0.62    Ca    9.9      15 Román 2019 06:14  Phos  3.9     06-15  Mg     2.0     06-15            RADIOLOGY & ADDITIONAL TESTS:    MEDICATIONS  (STANDING):  albumin human  5% IVPB 2750 milliLiter(s) IV Intermittent once  albumin human  5% IVPB 2750 milliLiter(s) IV Intermittent once  ALBUTerol/ipratropium for Nebulization 3 milliLiter(s) Nebulizer every 6 hours  calcium gluconate IVPB 1 Gram(s) IV Intermittent once  calcium gluconate IVPB 1 Gram(s) IV Intermittent once  chlorhexidine 0.12% Liquid 15 milliLiter(s) Oral Mucosa every 12 hours  chlorhexidine 2% Cloths 1 Application(s) Topical <User Schedule>  dextrose 5%. 1000 milliLiter(s) (50 mL/Hr) IV Continuous <Continuous>  dextrose 50% Injectable 25 Gram(s) IV Push once  enoxaparin Injectable 40 milliGRAM(s) SubCutaneous every 24 hours  fat emulsion (Plant Based) 20% Infusion 0.939 Gm/kG/Day (20.83 mL/Hr) IV Continuous <Continuous>  fludroCORTISONE 0.1 milliGRAM(s) Oral every 24 hours  heparin 1000 units/mL 2000 Unit(s) 2000 Unit(s) IV Push once  heparin 1000units/mL 4000 Unit(s) 4000 Unit(s) IV Push once  insulin regular  human corrective regimen sliding scale   SubCutaneous every 6 hours  midodrine 10 milliGRAM(s) Oral every 8 hours  morphine  - Injectable 1 milliGRAM(s) IV Push once  morphine  - Injectable 2 milliGRAM(s) IV Push once  nystatin Cream 1 Application(s) Topical two times a day  Parenteral Nutrition - Adult 1 Each (63 mL/Hr) TPN Continuous <Continuous>  sodium chloride 3%  Inhalation 3 milliLiter(s) Inhalation every 4 hours    MEDICATIONS  (PRN):  acetaminophen    Suspension .. 1000 milliGRAM(s) Oral every 8 hours PRN Moderate Pain (4 - 6)  dextrose 40% Gel 15 Gram(s) Oral once PRN Blood Glucose LESS THAN 70 milliGRAM(s)/deciliter  glucagon  Injectable 1 milliGRAM(s) IntraMuscular once PRN Glucose LESS THAN 70 milligrams/deciliter  lidocaine 2% Gel 1 Application(s) Topical two times a day PRN pain around PEG site  sodium chloride 0.9% lock flush 10 milliLiter(s) IV Push every 1 hour PRN Pre/post blood products, medications, blood draw, and to maintain line patency OVERNIGHT EVENTS: KESHAWN    SUBJECTIVE: Patient feels slightly nauseas this am, requesting zofran. Otherwise reports continued cough. No abdominal pain, chest pain. Reports no stool output from vagina that she has noticed.    Vital Signs Last 12 Hrs  T(F): 98.4 (06-16-19 @ 05:13), Max: 98.4 (06-16-19 @ 05:13)  HR: 81 (06-16-19 @ 05:13) (80 - 82)  BP: 124/81 (06-16-19 @ 05:13) (124/78 - 124/81)  BP(mean): --  RR: 18 (06-16-19 @ 05:13) (18 - 20)  SpO2: 100% (06-16-19 @ 05:13) (100% - 100%)  I&O's Summary    14 Jun 2019 07:01  -  15 Román 2019 07:00  --------------------------------------------------------  IN: 1782.4 mL / OUT: 2375 mL / NET: -592.6 mL    15 Román 2019 07:01  -  16 Jun 2019 06:33  --------------------------------------------------------  IN: 0 mL / OUT: 1450 mL / NET: -1450 mL        PHYSICAL EXAM:  Constitutional: NAD, comfortable in bed.  HEENT: NC/AT, PERRLA, EOMI, no conjunctival pallor or scleral icterus, MMM  Neck: Supple, no JVD, tracheostomy in place without secretions surrounding slightly protruded  Respiratory: on cpap, lungs clear bilaterally  Cardiovascular: RRR, normal S1 and S2, no m/r/g.   Gastrointestinal: +BS, soft NTND, no guarding or rebound tenderness, no palpable masses, large midline scar from previous surgery, PEG in place - no signs of infection, colostomy in place w liquid stool  : joseph in place  Extremities: wwp; no cyanosis, clubbing or edema.   Vascular: Pulses equal and strong throughout, R PICC line, R chest chemoport - accessed, L subclavian HD cath  Neurological: AAOx3, no CN deficits, strength 5/5 in UEs and 1/5 in LEs, sensation intact throughout.         LABS:                        7.7    8.26  )-----------( 360      ( 15 Román 2019 06:14 )             24.3     06-15    135  |  97  |  32<H>  ----------------------------<  160<H>  3.9   |  25  |  0.62    Ca    9.9      15 Román 2019 06:14  Phos  3.9     06-15  Mg     2.0     06-15            RADIOLOGY & ADDITIONAL TESTS:    MEDICATIONS  (STANDING):  albumin human  5% IVPB 2750 milliLiter(s) IV Intermittent once  albumin human  5% IVPB 2750 milliLiter(s) IV Intermittent once  ALBUTerol/ipratropium for Nebulization 3 milliLiter(s) Nebulizer every 6 hours  calcium gluconate IVPB 1 Gram(s) IV Intermittent once  calcium gluconate IVPB 1 Gram(s) IV Intermittent once  chlorhexidine 0.12% Liquid 15 milliLiter(s) Oral Mucosa every 12 hours  chlorhexidine 2% Cloths 1 Application(s) Topical <User Schedule>  dextrose 5%. 1000 milliLiter(s) (50 mL/Hr) IV Continuous <Continuous>  dextrose 50% Injectable 25 Gram(s) IV Push once  enoxaparin Injectable 40 milliGRAM(s) SubCutaneous every 24 hours  fat emulsion (Plant Based) 20% Infusion 0.939 Gm/kG/Day (20.83 mL/Hr) IV Continuous <Continuous>  fludroCORTISONE 0.1 milliGRAM(s) Oral every 24 hours  heparin 1000 units/mL 2000 Unit(s) 2000 Unit(s) IV Push once  heparin 1000units/mL 4000 Unit(s) 4000 Unit(s) IV Push once  insulin regular  human corrective regimen sliding scale   SubCutaneous every 6 hours  midodrine 10 milliGRAM(s) Oral every 8 hours  morphine  - Injectable 1 milliGRAM(s) IV Push once  morphine  - Injectable 2 milliGRAM(s) IV Push once  nystatin Cream 1 Application(s) Topical two times a day  Parenteral Nutrition - Adult 1 Each (63 mL/Hr) TPN Continuous <Continuous>  sodium chloride 3%  Inhalation 3 milliLiter(s) Inhalation every 4 hours    MEDICATIONS  (PRN):  acetaminophen    Suspension .. 1000 milliGRAM(s) Oral every 8 hours PRN Moderate Pain (4 - 6)  dextrose 40% Gel 15 Gram(s) Oral once PRN Blood Glucose LESS THAN 70 milliGRAM(s)/deciliter  glucagon  Injectable 1 milliGRAM(s) IntraMuscular once PRN Glucose LESS THAN 70 milligrams/deciliter  lidocaine 2% Gel 1 Application(s) Topical two times a day PRN pain around PEG site  sodium chloride 0.9% lock flush 10 milliLiter(s) IV Push every 1 hour PRN Pre/post blood products, medications, blood draw, and to maintain line patency

## 2019-06-17 ENCOUNTER — TRANSCRIPTION ENCOUNTER (OUTPATIENT)
Age: 59
End: 2019-06-17

## 2019-06-17 ENCOUNTER — APPOINTMENT (OUTPATIENT)
Dept: GYNECOLOGIC ONCOLOGY | Facility: CLINIC | Age: 59
End: 2019-06-17

## 2019-06-17 ENCOUNTER — APPOINTMENT (OUTPATIENT)
Dept: UROLOGY | Facility: CLINIC | Age: 59
End: 2019-06-17

## 2019-06-17 LAB
ALBUMIN SERPL ELPH-MCNC: 3.7 G/DL — SIGNIFICANT CHANGE UP (ref 3.3–5)
ALP SERPL-CCNC: 111 U/L — SIGNIFICANT CHANGE UP (ref 40–120)
ALT FLD-CCNC: 14 U/L — SIGNIFICANT CHANGE UP (ref 10–45)
ANION GAP SERPL CALC-SCNC: 12 MMOL/L — SIGNIFICANT CHANGE UP (ref 5–17)
APPEARANCE UR: CLEAR — SIGNIFICANT CHANGE UP
AST SERPL-CCNC: 21 U/L — SIGNIFICANT CHANGE UP (ref 10–40)
BILIRUB SERPL-MCNC: 0.3 MG/DL — SIGNIFICANT CHANGE UP (ref 0.2–1.2)
BILIRUB UR-MCNC: NEGATIVE — SIGNIFICANT CHANGE UP
BLD GP AB SCN SERPL QL: NEGATIVE — SIGNIFICANT CHANGE UP
BUN SERPL-MCNC: 36 MG/DL — HIGH (ref 7–23)
CALCIUM SERPL-MCNC: 10 MG/DL — SIGNIFICANT CHANGE UP (ref 8.4–10.5)
CHLORIDE SERPL-SCNC: 99 MMOL/L — SIGNIFICANT CHANGE UP (ref 96–108)
CO2 SERPL-SCNC: 24 MMOL/L — SIGNIFICANT CHANGE UP (ref 22–31)
COLOR SPEC: YELLOW — SIGNIFICANT CHANGE UP
CREAT SERPL-MCNC: 0.67 MG/DL — SIGNIFICANT CHANGE UP (ref 0.5–1.3)
DIFF PNL FLD: ABNORMAL
GLUCOSE SERPL-MCNC: 154 MG/DL — HIGH (ref 70–99)
GLUCOSE UR QL: NEGATIVE — SIGNIFICANT CHANGE UP
HCT VFR BLD CALC: 24.1 % — LOW (ref 34.5–45)
HGB BLD-MCNC: 7.5 G/DL — LOW (ref 11.5–15.5)
KETONES UR-MCNC: NEGATIVE — SIGNIFICANT CHANGE UP
LACTATE SERPL-SCNC: 1 MMOL/L — SIGNIFICANT CHANGE UP (ref 0.5–2)
LEUKOCYTE ESTERASE UR-ACNC: ABNORMAL
MCHC RBC-ENTMCNC: 28.5 PG — SIGNIFICANT CHANGE UP (ref 27–34)
MCHC RBC-ENTMCNC: 31.1 GM/DL — LOW (ref 32–36)
MCV RBC AUTO: 91.6 FL — SIGNIFICANT CHANGE UP (ref 80–100)
NITRITE UR-MCNC: NEGATIVE — SIGNIFICANT CHANGE UP
NRBC # BLD: 0 /100 WBCS — SIGNIFICANT CHANGE UP (ref 0–0)
PH UR: 6 — SIGNIFICANT CHANGE UP (ref 5–8)
PLATELET # BLD AUTO: 314 K/UL — SIGNIFICANT CHANGE UP (ref 150–400)
POTASSIUM SERPL-MCNC: 4.1 MMOL/L — SIGNIFICANT CHANGE UP (ref 3.5–5.3)
POTASSIUM SERPL-SCNC: 4.1 MMOL/L — SIGNIFICANT CHANGE UP (ref 3.5–5.3)
PROT SERPL-MCNC: 7.3 G/DL — SIGNIFICANT CHANGE UP (ref 6–8.3)
PROT UR-MCNC: 100 MG/DL
RAPID RVP RESULT: SIGNIFICANT CHANGE UP
RBC # BLD: 2.63 M/UL — LOW (ref 3.8–5.2)
RBC # FLD: 15.7 % — HIGH (ref 10.3–14.5)
RH IG SCN BLD-IMP: POSITIVE — SIGNIFICANT CHANGE UP
SODIUM SERPL-SCNC: 135 MMOL/L — SIGNIFICANT CHANGE UP (ref 135–145)
SP GR SPEC: 1.02 — SIGNIFICANT CHANGE UP (ref 1–1.03)
UROBILINOGEN FLD QL: 0.2 E.U./DL — SIGNIFICANT CHANGE UP
WBC # BLD: 15.63 K/UL — HIGH (ref 3.8–10.5)
WBC # FLD AUTO: 15.63 K/UL — HIGH (ref 3.8–10.5)

## 2019-06-17 PROCEDURE — 99233 SBSQ HOSP IP/OBS HIGH 50: CPT

## 2019-06-17 PROCEDURE — 99232 SBSQ HOSP IP/OBS MODERATE 35: CPT | Mod: GC

## 2019-06-17 PROCEDURE — 71045 X-RAY EXAM CHEST 1 VIEW: CPT | Mod: 26

## 2019-06-17 PROCEDURE — ZZZZZ: CPT

## 2019-06-17 RX ORDER — ELECTROLYTE SOLUTION,INJ
1 VIAL (ML) INTRAVENOUS
Refills: 0 | Status: DISCONTINUED | OUTPATIENT
Start: 2019-06-17 | End: 2019-06-17

## 2019-06-17 RX ORDER — ONDANSETRON 8 MG/1
4 TABLET, FILM COATED ORAL EVERY 6 HOURS
Refills: 0 | Status: DISCONTINUED | OUTPATIENT
Start: 2019-06-17 | End: 2019-06-17

## 2019-06-17 RX ORDER — I.V. FAT EMULSION 20 G/100ML
250 EMULSION INTRAVENOUS
Qty: 0 | Refills: 0 | DISCHARGE
Start: 2019-06-17

## 2019-06-17 RX ORDER — ONDANSETRON 8 MG/1
4 TABLET, FILM COATED ORAL
Qty: 0 | Refills: 0 | DISCHARGE
Start: 2019-06-17

## 2019-06-17 RX ORDER — MIDODRINE HYDROCHLORIDE 2.5 MG/1
1 TABLET ORAL
Qty: 0 | Refills: 0 | DISCHARGE
Start: 2019-06-17

## 2019-06-17 RX ORDER — ELECTROLYTE SOLUTION,INJ
1 VIAL (ML) INTRAVENOUS
Qty: 0 | Refills: 0 | DISCHARGE
Start: 2019-06-17

## 2019-06-17 RX ORDER — I.V. FAT EMULSION 20 G/100ML
50 EMULSION INTRAVENOUS ONCE
Refills: 0 | Status: COMPLETED | OUTPATIENT
Start: 2019-06-17 | End: 2019-06-17

## 2019-06-17 RX ADMIN — INSULIN HUMAN 2: 100 INJECTION, SOLUTION SUBCUTANEOUS at 00:13

## 2019-06-17 RX ADMIN — Medication 0.25 MILLIGRAM(S): at 12:12

## 2019-06-17 RX ADMIN — MIDODRINE HYDROCHLORIDE 5 MILLIGRAM(S): 2.5 TABLET ORAL at 22:17

## 2019-06-17 RX ADMIN — MIDODRINE HYDROCHLORIDE 5 MILLIGRAM(S): 2.5 TABLET ORAL at 06:00

## 2019-06-17 RX ADMIN — ENOXAPARIN SODIUM 40 MILLIGRAM(S): 100 INJECTION SUBCUTANEOUS at 17:43

## 2019-06-17 RX ADMIN — Medication 3 MILLILITER(S): at 05:59

## 2019-06-17 RX ADMIN — CHLORHEXIDINE GLUCONATE 15 MILLILITER(S): 213 SOLUTION TOPICAL at 10:09

## 2019-06-17 RX ADMIN — ONDANSETRON 4 MILLIGRAM(S): 8 TABLET, FILM COATED ORAL at 12:12

## 2019-06-17 RX ADMIN — FLUDROCORTISONE ACETATE 0.1 MILLIGRAM(S): 0.1 TABLET ORAL at 06:57

## 2019-06-17 RX ADMIN — SODIUM CHLORIDE 3 MILLILITER(S): 9 INJECTION INTRAMUSCULAR; INTRAVENOUS; SUBCUTANEOUS at 15:07

## 2019-06-17 RX ADMIN — Medication 3 MILLILITER(S): at 00:13

## 2019-06-17 RX ADMIN — CHLORHEXIDINE GLUCONATE 1 APPLICATION(S): 213 SOLUTION TOPICAL at 06:00

## 2019-06-17 RX ADMIN — Medication 1000 MILLIGRAM(S): at 13:49

## 2019-06-17 RX ADMIN — SODIUM CHLORIDE 3 MILLILITER(S): 9 INJECTION INTRAMUSCULAR; INTRAVENOUS; SUBCUTANEOUS at 10:11

## 2019-06-17 RX ADMIN — INSULIN HUMAN 2: 100 INJECTION, SOLUTION SUBCUTANEOUS at 12:44

## 2019-06-17 RX ADMIN — MIDODRINE HYDROCHLORIDE 5 MILLIGRAM(S): 2.5 TABLET ORAL at 15:07

## 2019-06-17 RX ADMIN — Medication 1 EACH: at 18:41

## 2019-06-17 RX ADMIN — INSULIN HUMAN 2: 100 INJECTION, SOLUTION SUBCUTANEOUS at 05:58

## 2019-06-17 RX ADMIN — Medication 3 MILLILITER(S): at 17:41

## 2019-06-17 RX ADMIN — SODIUM CHLORIDE 3 MILLILITER(S): 9 INJECTION INTRAMUSCULAR; INTRAVENOUS; SUBCUTANEOUS at 22:17

## 2019-06-17 RX ADMIN — INSULIN HUMAN 2: 100 INJECTION, SOLUTION SUBCUTANEOUS at 17:26

## 2019-06-17 RX ADMIN — CHLORHEXIDINE GLUCONATE 15 MILLILITER(S): 213 SOLUTION TOPICAL at 22:17

## 2019-06-17 RX ADMIN — I.V. FAT EMULSION 20.83 GRAM(S): 20 EMULSION INTRAVENOUS at 18:41

## 2019-06-17 RX ADMIN — Medication 0.5 MILLIGRAM(S): at 17:41

## 2019-06-17 RX ADMIN — Medication 3 MILLILITER(S): at 12:47

## 2019-06-17 RX ADMIN — SODIUM CHLORIDE 3 MILLILITER(S): 9 INJECTION INTRAMUSCULAR; INTRAVENOUS; SUBCUTANEOUS at 01:46

## 2019-06-17 RX ADMIN — Medication 1000 MILLIGRAM(S): at 14:49

## 2019-06-17 RX ADMIN — NYSTATIN CREAM 1 APPLICATION(S): 100000 CREAM TOPICAL at 05:59

## 2019-06-17 RX ADMIN — NYSTATIN CREAM 1 APPLICATION(S): 100000 CREAM TOPICAL at 17:41

## 2019-06-17 RX ADMIN — SODIUM CHLORIDE 3 MILLILITER(S): 9 INJECTION INTRAMUSCULAR; INTRAVENOUS; SUBCUTANEOUS at 18:40

## 2019-06-17 RX ADMIN — SODIUM CHLORIDE 3 MILLILITER(S): 9 INJECTION INTRAMUSCULAR; INTRAVENOUS; SUBCUTANEOUS at 05:59

## 2019-06-17 NOTE — PROGRESS NOTE ADULT - PROBLEM SELECTOR PLAN 10
Support provided to patient and family. Patient to have access to supportive services during rest of hospital stay as the patient/family deemed necessary ie. Chaplaincy, Massage therapy, Music therapy, Patient and family supportive services, Palliative SW, etc. as identified during the patients PSSA screening the patient would benefit from: continued visits from Patient and Family Support counselor and massage therapy. Support provided to patient and family. Patient to have access to supportive services during rest of hospital stay as the patient/family deemed necessary ie. Chaplaincy, Massage therapy, Music therapy, Patient and family supportive services, Palliative SW, etc. as identified during the patients PSSA screening the patient would benefit from: continued visits from Patient and Family Support counselor and massage therapy.    Being transferred to [facility]- difficult change.  Reassured.  Sister will be traveling with her.  Scheduled to be back for plasmapheresis week of July 1, and our team will continue to follow

## 2019-06-17 NOTE — PROGRESS NOTE ADULT - PROBLEM SELECTOR PLAN 1
trach to vent.   -plan for plasmapheresin several weeks  -Currently without significant respiratory complaints    -No recommendation for scopolamine or Robinul due to neuro status and anticholinergic effect. trach to vent.   -plan for plasmapheresis week of July 1  -Currently without significant respiratory complaints    -No recommendation for scopolamine or Robinul due to neuro status and anticholinergic effect.

## 2019-06-17 NOTE — PROGRESS NOTE ADULT - SUBJECTIVE AND OBJECTIVE BOX
Pt seen and examined at bedside. Pt states she was eager to go to rehab center but is upset now because her fever from today will extend her stay here longer. Pt reports no stool output from vagina today.    Pt denies, chills, chest pain, nausea, vomiting, lightheadedness, or dizziness.      T(F): 100.3 (19 @ 14:30), Max: 102 (19 @ 13:50)  HR: 112 (19 @ 13:50) (71 - 112)  BP: 110/65 (19 @ 13:50) (110/65 - 136/84)  RR: 16 (19 @ 13:50) (13 - 25)  SpO2: 98% (19 @ 13:50) (98% - 100%)  Wt(kg): --  I&O's Summary    2019 07:01  -  2019 07:00  --------------------------------------------------------  IN: 756 mL / OUT: 2250 mL / NET: -1494 mL    MEDICATIONS  (STANDING):  albumin human  5% IVPB 2750 milliLiter(s) IV Intermittent once  albumin human  5% IVPB 2750 milliLiter(s) IV Intermittent once  ALBUTerol/ipratropium for Nebulization 3 milliLiter(s) Nebulizer every 6 hours  calcium gluconate IVPB 1 Gram(s) IV Intermittent once  calcium gluconate IVPB 1 Gram(s) IV Intermittent once  chlorhexidine 0.12% Liquid 15 milliLiter(s) Oral Mucosa every 12 hours  chlorhexidine 2% Cloths 1 Application(s) Topical <User Schedule>  dextrose 5%. 1000 milliLiter(s) (50 mL/Hr) IV Continuous <Continuous>  dextrose 50% Injectable 25 Gram(s) IV Push once  enoxaparin Injectable 40 milliGRAM(s) SubCutaneous every 24 hours  fat emulsion (Plant Based) 20% IVPB 50 Gram(s) IV Intermittent once  fludroCORTISONE 0.1 milliGRAM(s) Oral every 24 hours  heparin 1000 units/mL 2000 Unit(s) 2000 Unit(s) IV Push once  heparin 1000units/mL 4000 Unit(s) 4000 Unit(s) IV Push once  insulin regular  human corrective regimen sliding scale   SubCutaneous every 6 hours  LORazepam     Tablet 0.5 milliGRAM(s) Oral every 8 hours  midodrine 5 milliGRAM(s) Oral every 8 hours  morphine  - Injectable 1 milliGRAM(s) IV Push once  morphine  - Injectable 2 milliGRAM(s) IV Push once  nystatin Cream 1 Application(s) Topical two times a day  Parenteral Nutrition - Adult 1 Each (63 mL/Hr) TPN Continuous <Continuous>  Parenteral Nutrition - Adult 1 Each (63 mL/Hr) TPN Continuous <Continuous>  sodium chloride 3%  Inhalation 3 milliLiter(s) Inhalation every 4 hours    MEDICATIONS  (PRN):  acetaminophen    Suspension .. 1000 milliGRAM(s) Oral every 8 hours PRN Moderate Pain (4 - 6)  dextrose 40% Gel 15 Gram(s) Oral once PRN Blood Glucose LESS THAN 70 milliGRAM(s)/deciliter  glucagon  Injectable 1 milliGRAM(s) IntraMuscular once PRN Glucose LESS THAN 70 milligrams/deciliter  lidocaine 2% Gel 1 Application(s) Topical two times a day PRN pain around PEG site  ondansetron Injectable 4 milliGRAM(s) IV Push every 6 hours PRN Nausea and/or Vomiting  sodium chloride 0.9% lock flush 10 milliLiter(s) IV Push every 1 hour PRN Pre/post blood products, medications, blood draw, and to maintain line patency    Physical Exam:  Constitutional: NAD  Pulmonary: Muffled sounds in R lung   Cardiovascular: Tachycardia   GI: right flank ecchymosis (outlined), soft, nontender, liquid stool in ostomy bag   : Yellow urine in bag appears cloudy   Extremities: no lower extremity edema or calve tenderness. SCDs in place       LABS:                        7.5    15.63 )-----------( 314      ( 2019 14:56 )             24.1     06-17    135  |  99  |  36<H>  ----------------------------<  154<H>  4.1   |  24  |  0.67    Ca    10.0      2019 14:57  Phos  4.2     06-16  Mg     2.0     06-16    TPro  7.3  /  Alb  3.7  /  TBili  0.3  /  DBili  x   /  AST  21  /  ALT  14  /  AlkPhos  111  06-17      Urinalysis Basic - ( 2019 14:59 )    Color: Yellow / Appearance: Clear / S.020 / pH: x  Gluc: x / Ketone: NEGATIVE  / Bili: Negative / Urobili: 0.2 E.U./dL   Blood: x / Protein: 100 mg/dL / Nitrite: NEGATIVE   Leuk Esterase: Large / RBC: > 10 /HPF / WBC Many /HPF   Sq Epi: x / Non Sq Epi: 0-5 /HPF / Bacteria: Present /HPF      RADIOLOGY & ADDITIONAL TESTS:

## 2019-06-17 NOTE — PROGRESS NOTE ADULT - SUBJECTIVE AND OBJECTIVE BOX
TIM MCDANIEL             [id number]    CC:  weakness, GOC    HPI:  58 yo G0 w/ known stage IV endometrial carcinoma s/p staging surgery 7/2018 and 1 cycle of chemotherapy in 2/2019 followed by multiple admissions for management of symptomatic enterovaginal fistula, complex fistula associated urinary tract infection, bacteremia presents on referral from [facility] after fever developing fever.  Found to have severe sepsis, required intubation and treatment of UTI,.  Initially improved but then declined neurologically and required intubation for hypercapnic respiratory failure.  Was extubated and did well for several days. Required reintubation for profound weakness and again, hypercapneic respiratory failure.  Treated with plasmapheresis with improvement, but still unable to be safely weaned.  patient chose to be trached and pegged because she felt she could get better.    SUBJECTIVE:  Patient seen at bedside, she is awake and alert.  . Tolerating TPN which was restarted because of recurrent enterovaginal fistula.  Has not had trouble with mucous plugging.  Patient will be going to [facility] today- reports anxiety and some nausea.  No fever or chills, no abdominal pain  Given small dose 0.25 of Ativan IV just as I came to her room    ROS:  DYSPNEA: No  NAUS/VOM: No  SECRETIONS: at baseline  AGITATION: No, but reports anxiety  Pain (Y/N): No  -Provocation/Palliation:  -Quality/Quantity:  -Radiating:  -Severity:  -Timing/Frequency:  -Impact on ADLs:    Remainder of ROS unremarkable      PEx:  Vital Signs Last 24 Hrs  Vital Signs Last 24 Hrs  T(C): 37.2 (17 Jun 2019 10:35), Max: 37.3 (16 Jun 2019 17:18)  T(F): 99 (17 Jun 2019 10:35), Max: 99.1 (16 Jun 2019 17:18)  HR: 100 (17 Jun 2019 10:35) (71 - 100)  BP: 136/84 (17 Jun 2019 10:35) (113/68 - 136/84)  BP(mean): 83 (16 Jun 2019 20:53) (83 - 83)  RR: 20 (17 Jun 2019 10:35) (13 - 25)  SpO2: 100% (17 Jun 2019 10:35) (98% - 100%)    General: frail female, chronically ill appearing un comfortable in bed ; appearing older than stated age, pale, appears anxious.  Denies specific symptoms.  HEENT: moderate alopecia; conjunctival pallor;  smiles often.  Neck: supple, but appears stronger with more head control.  Able to shake head vigorously "yes" and "no" trach in place  CVS: S1/S2, RRR  Resp: on ventilator  GI: soft, mild tenderness over LLQ, no rebound,  PEG  Musc: hypotrophic muscularity and bulk, particularly of UE/hands, LE; profound kyphoscoliosis muscle wasting of left trapezius and latissimus dorsi  Neuro: awake and alert; shrugs shoulders moderate neck strength, squeezes my hand, is able to move all extremities, can move UEs more than LEs, not able to life LEs  Psych: aware of care management. states she was on TPN before, and knows why she should be on it again.   Skin: intact	     ALLERGIES: IVIG PRODUCT IS PRIGIVEN OR GAMMUNEX (Unknown)  No Known Allergies    OPIATE NAÏVE (Y/N): Yes on presentation, had received opiates earlier in admission    MEDICATIONS  (STANDING): reviewed  albumin human  5% IVPB 2750 milliLiter(s) IV Intermittent once  albumin human  5% IVPB 2750 milliLiter(s) IV Intermittent once  ALBUTerol/ipratropium for Nebulization 3 milliLiter(s) Nebulizer every 6 hours  calcium gluconate IVPB 1 Gram(s) IV Intermittent once  calcium gluconate IVPB 1 Gram(s) IV Intermittent once  chlorhexidine 0.12% Liquid 15 milliLiter(s) Oral Mucosa every 12 hours  chlorhexidine 2% Cloths 1 Application(s) Topical <User Schedule>  dextrose 5%. 1000 milliLiter(s) (50 mL/Hr) IV Continuous <Continuous>  dextrose 50% Injectable 25 Gram(s) IV Push once  enoxaparin Injectable 40 milliGRAM(s) SubCutaneous every 24 hours  fat emulsion (Plant Based) 20% IVPB 50 Gram(s) IV Intermittent once  fludroCORTISONE 0.1 milliGRAM(s) Oral every 24 hours  heparin 1000 units/mL 2000 Unit(s) 2000 Unit(s) IV Push once  heparin 1000units/mL 4000 Unit(s) 4000 Unit(s) IV Push once  insulin regular  human corrective regimen sliding scale   SubCutaneous every 6 hours  LORazepam     Tablet 0.5 milliGRAM(s) Oral every 8 hours - new  midodrine 5 milliGRAM(s) Oral every 8 hours  morphine  - Injectable 1 milliGRAM(s) IV Push once  morphine  - Injectable 2 milliGRAM(s) IV Push once  nystatin Cream 1 Application(s) Topical two times a day  Parenteral Nutrition - Adult 1 Each (63 mL/Hr) TPN Continuous <Continuous>  Parenteral Nutrition - Adult 1 Each (63 mL/Hr) TPN Continuous <Continuous>  sodium chloride 3%  Inhalation 3 milliLiter(s) Inhalation every 4 hours    MEDICATIONS  (PRN):  acetaminophen    Suspension .. 1000 milliGRAM(s) Oral every 8 hours PRN Moderate Pain (4 - 6)  dextrose 40% Gel 15 Gram(s) Oral once PRN Blood Glucose LESS THAN 70 milliGRAM(s)/deciliter  glucagon  Injectable 1 milliGRAM(s) IntraMuscular once PRN Glucose LESS THAN 70 milligrams/deciliter  lidocaine 2% Gel 1 Application(s) Topical two times a day PRN pain around PEG site  ondansetron Injectable 4 milliGRAM(s) IV Push every 6 hours PRN Nausea and/or Vomiting  sodium chloride 0.9% lock flush 10 milliLiter(s) IV Push every 1 hour PRN Pre/post blood products, medications, blood draw, and to maintain line patency                      LABS:                                               8.0    9.51  )-----------( 372      ( 16 Jun 2019 07:03 )             25.8   06-16    136  |  98  |  34<H>  ----------------------------<  162<H>  4.1   |  24  |  0.58    Ca    10.0      16 Jun 2019 07:03  Phos  4.2     06-16  Mg     2.0     06-16                         IMAGING: REVIEWED    CT Abd/Plv- CTA chest (June 5th):   IMPRESSION:    No pulmonary embolus.    Mild left hydroureteronephrosis due to distal obstruction from either 2.5   cm left pelvic sidewall implant and/or 6.4mm stone at the left   UVJ/bladder.    Enterovaginal fistula.    Pelvic lymphadenopathy mildly increased from prior exam.     Small amount of free fluid around the liver and spleen.   < from: Xray Chest 1 View- PORTABLE-Routine (05.24.19 @ 04:45) >  EXAM:  XR CHEST PORTABLE ROUTINE 1V                          PROCEDURE DATE:  05/24/2019      INTERPRETATION:    CXR 6/5/2019: 6/5/2019. Tracheostomy tube and bilateral central venous lines are   unchanged. Stable heart size. There is again increased markings in the   left lung which could be due to patient's rotation. Right lung is clear.   Cannot exclude a small left pleural fusion. No right pleural effusion. No   pneumothorax.    ADVANCED DIRECTIVES:     see note above, DNR, trach, PEG    DECISION MAKER: patient/self  LEGAL SURROGATE: Esha Avila (sister) [phone]    PSYCHOSOCIAL-SPIRITUAL ASSESSMENT:       Reviewed       Care plan as below	    GOALS OF CARE DISCUSSION       Palliative care info/counseling provided previously.  Awaiting arrival of Esha roblero	           Advanced Directives addressed please see Advance Care Planning Note	           See previous Palliative Medicine Notes       Documentation of GOC: Agrees with efforts to treat,     on trach and TPN for nutrition.  Remains hopeful in a difficult situation.  Wants to continue plasmapheresis   	      AGENCY CHOICE DISCUSSED:     LTAC for weaning    REFERRALS	        Palliative Med        Unit SW/Case Mgmt        - declined       Speech/Swallow - failed dysphagia; has NGT currently       Patient/Family Support       Massage Therapy       Music Therapy       Hospice - not applicable at present, still hopeful to be treated       Nutrition       PT/OT

## 2019-06-17 NOTE — PROGRESS NOTE ADULT - SUBJECTIVE AND OBJECTIVE BOX
Incomplete note    OVERNIGHT EVENTS:    SUBJECTIVE:    Vital Signs Last 12 Hrs  T(F): 100.3 (19 @ 14:30), Max: 102 (19 @ 13:50)  HR: 112 (19 @ 13:50) (71 - 112)  BP: 110/65 (19 @ 13:50) (110/65 - 136/84)  BP(mean): --  RR: 16 (19 @ 13:50) (16 - 25)  SpO2: 98% (19 @ 13:50) (98% - 100%)  I&O's Summary    2019 07:01  -  2019 07:00  --------------------------------------------------------  IN: 756 mL / OUT: 2250 mL / NET: -1494 mL        PHYSICAL EXAM:  Constitutional: NAD, comfortable in bed.  HEENT: NC/AT, PERRLA, EOMI, no conjunctival pallor or scleral icterus, MMM  Neck: Supple, no JVD  Respiratory: Normal rate, rhythm, depth, effort. CTAB. No w/r/r.   Cardiovascular: RRR, normal S1 and S2, no m/r/g.   Gastrointestinal: +BS, soft NTND, no guarding or rebound tenderness, no palpable masses   Extremities: wwp; no cyanosis, clubbing or edema.   Vascular: Pulses equal and strong throughout.   Neurological: AAOx3, no CN deficits, strength and sensation intact throughout.   Skin: No gross skin abnormalities or rashes        LABS:                        7.5    15.63 )-----------( 314      ( 2019 14:56 )             24.1     06-    135  |  99  |  36<H>  ----------------------------<  154<H>  4.1   |  24  |  0.67    Ca    10.0      2019 14:57  Phos  4.2     06-16  Mg     2.0     06-16    TPro  7.3  /  Alb  3.7  /  TBili  0.3  /  DBili  x   /  AST  21  /  ALT  14  /  AlkPhos  111  06-17      Urinalysis Basic - ( 2019 14:59 )    Color: Yellow / Appearance: Clear / S.020 / pH: x  Gluc: x / Ketone: NEGATIVE  / Bili: Negative / Urobili: 0.2 E.U./dL   Blood: x / Protein: 100 mg/dL / Nitrite: NEGATIVE   Leuk Esterase: Large / RBC: > 10 /HPF / WBC Many /HPF   Sq Epi: x / Non Sq Epi: 0-5 /HPF / Bacteria: Present /HPF        RADIOLOGY & ADDITIONAL TESTS:    MEDICATIONS  (STANDING):  albumin human  5% IVPB 2750 milliLiter(s) IV Intermittent once  albumin human  5% IVPB 2750 milliLiter(s) IV Intermittent once  ALBUTerol/ipratropium for Nebulization 3 milliLiter(s) Nebulizer every 6 hours  calcium gluconate IVPB 1 Gram(s) IV Intermittent once  calcium gluconate IVPB 1 Gram(s) IV Intermittent once  chlorhexidine 0.12% Liquid 15 milliLiter(s) Oral Mucosa every 12 hours  chlorhexidine 2% Cloths 1 Application(s) Topical <User Schedule>  dextrose 5%. 1000 milliLiter(s) (50 mL/Hr) IV Continuous <Continuous>  dextrose 50% Injectable 25 Gram(s) IV Push once  enoxaparin Injectable 40 milliGRAM(s) SubCutaneous every 24 hours  fat emulsion (Plant Based) 20% IVPB 50 Gram(s) IV Intermittent once  fludroCORTISONE 0.1 milliGRAM(s) Oral every 24 hours  heparin 1000 units/mL 2000 Unit(s) 2000 Unit(s) IV Push once  heparin 1000units/mL 4000 Unit(s) 4000 Unit(s) IV Push once  insulin regular  human corrective regimen sliding scale   SubCutaneous every 6 hours  LORazepam     Tablet 0.5 milliGRAM(s) Oral every 8 hours  midodrine 5 milliGRAM(s) Oral every 8 hours  morphine  - Injectable 1 milliGRAM(s) IV Push once  morphine  - Injectable 2 milliGRAM(s) IV Push once  nystatin Cream 1 Application(s) Topical two times a day  Parenteral Nutrition - Adult 1 Each (63 mL/Hr) TPN Continuous <Continuous>  Parenteral Nutrition - Adult 1 Each (63 mL/Hr) TPN Continuous <Continuous>  sodium chloride 3%  Inhalation 3 milliLiter(s) Inhalation every 4 hours    MEDICATIONS  (PRN):  acetaminophen    Suspension .. 1000 milliGRAM(s) Oral every 8 hours PRN Moderate Pain (4 - 6)  dextrose 40% Gel 15 Gram(s) Oral once PRN Blood Glucose LESS THAN 70 milliGRAM(s)/deciliter  glucagon  Injectable 1 milliGRAM(s) IntraMuscular once PRN Glucose LESS THAN 70 milligrams/deciliter  lidocaine 2% Gel 1 Application(s) Topical two times a day PRN pain around PEG site  ondansetron Injectable 4 milliGRAM(s) IV Push every 6 hours PRN Nausea and/or Vomiting  sodium chloride 0.9% lock flush 10 milliLiter(s) IV Push every 1 hour PRN Pre/post blood products, medications, blood draw, and to maintain line patency Incomplete note    Interval events and subjective: Patient was set to be discharged to [facility] today, however spiked fever to 102 oral, tachy to 110s in the afternoon. Patient reports continued cough. Denies any CP, abdominal pain, no stool from vagina, no headache, dizziness. Has occasional nausea controlled w zofran.     After fever, stat labs sent including CBC, BMP, blood cx, urine cx, sputum cx, RVP, lactate, type and screen, and coags. Lactate 1. WBC increased to 15. fever and tachycardia improved w tylenol. Discharge cancelled pending further workup.    Vital Signs Last 12 Hrs  T(F): 100.3 (19 @ 14:30), Max: 102 (19 @ 13:50)  HR: 112 (19 @ 13:50) (71 - 112)  BP: 110/65 (19 @ 13:50) (110/65 - 136/84)  BP(mean): --  RR: 16 (19 @ 13:50) (16 - 25)  SpO2: 98% (19 @ 13:50) (98% - 100%)  I&O's Summary    2019 07:01  -  2019 07:00  --------------------------------------------------------  IN: 756 mL / OUT: 2250 mL / NET: -1494 mL        PHYSICAL EXAM (at time of fever):  Constitutional: NAD, comfortable in bed on vent w occasional coughing  HEENT: NC/AT, PERRLA, EOMI, no conjunctival pallor or scleral icterus, MMM, tracheostomy in place, no signs of infection  Neck: Supple, no tenderness  Respiratory: on ACVC, Normal rate, rhythm, depth, effort. rhonchorus R>L however likely contribution of upper airway sounds from trach/vent.   Cardiovascular: tachycardic, regular rhythm, normal S1 and S2, no m/r/g.   Gastrointestinal: +BS, soft NTND, no guarding or rebound tenderness, large midline scar, L colostomy bag w green liquid stool, PEG in place without signs of infection or tenderness. no suprapubic tenderness or CVA tenderness  : joseph in place  Extremities: wwp; no cyanosis, clubbing or edema.   Vascular: Pulses equal and strong throughout. R arm PICC line, L subclavian HD cath, and R chest wall chemoport all without erythema or tenderness to palpation  Neurological: AAOx3, no CN deficits, strength 5/5 in UEs, 1/5 in LEs   Skin: No gross skin abnormalities or rashes. previously had a sacral decub ulcer that is now healed        LABS:                        7.5    15.63 )-----------( 314      ( 2019 14:56 )             24.1     06-17    135  |  99  |  36<H>  ----------------------------<  154<H>  4.1   |  24  |  0.67    Ca    10.0      2019 14:57  Phos  4.2     06-16  Mg     2.0     06-16    TPro  7.3  /  Alb  3.7  /  TBili  0.3  /  DBili  x   /  AST  21  /  ALT  14  /  AlkPhos  111  06-17      Urinalysis Basic - ( 2019 14:59 )    Color: Yellow / Appearance: Clear / S.020 / pH: x  Gluc: x / Ketone: NEGATIVE  / Bili: Negative / Urobili: 0.2 E.U./dL   Blood: x / Protein: 100 mg/dL / Nitrite: NEGATIVE   Leuk Esterase: Large / RBC: > 10 /HPF / WBC Many /HPF   Sq Epi: x / Non Sq Epi: 0-5 /HPF / Bacteria: Present /HPF        RADIOLOGY & ADDITIONAL TESTS:    MEDICATIONS  (STANDING):  albumin human  5% IVPB 2750 milliLiter(s) IV Intermittent once  albumin human  5% IVPB 2750 milliLiter(s) IV Intermittent once  ALBUTerol/ipratropium for Nebulization 3 milliLiter(s) Nebulizer every 6 hours  calcium gluconate IVPB 1 Gram(s) IV Intermittent once  calcium gluconate IVPB 1 Gram(s) IV Intermittent once  chlorhexidine 0.12% Liquid 15 milliLiter(s) Oral Mucosa every 12 hours  chlorhexidine 2% Cloths 1 Application(s) Topical <User Schedule>  dextrose 5%. 1000 milliLiter(s) (50 mL/Hr) IV Continuous <Continuous>  dextrose 50% Injectable 25 Gram(s) IV Push once  enoxaparin Injectable 40 milliGRAM(s) SubCutaneous every 24 hours  fat emulsion (Plant Based) 20% IVPB 50 Gram(s) IV Intermittent once  fludroCORTISONE 0.1 milliGRAM(s) Oral every 24 hours  heparin 1000 units/mL 2000 Unit(s) 2000 Unit(s) IV Push once  heparin 1000units/mL 4000 Unit(s) 4000 Unit(s) IV Push once  insulin regular  human corrective regimen sliding scale   SubCutaneous every 6 hours  LORazepam     Tablet 0.5 milliGRAM(s) Oral every 8 hours  midodrine 5 milliGRAM(s) Oral every 8 hours  morphine  - Injectable 1 milliGRAM(s) IV Push once  morphine  - Injectable 2 milliGRAM(s) IV Push once  nystatin Cream 1 Application(s) Topical two times a day  Parenteral Nutrition - Adult 1 Each (63 mL/Hr) TPN Continuous <Continuous>  Parenteral Nutrition - Adult 1 Each (63 mL/Hr) TPN Continuous <Continuous>  sodium chloride 3%  Inhalation 3 milliLiter(s) Inhalation every 4 hours    MEDICATIONS  (PRN):  acetaminophen    Suspension .. 1000 milliGRAM(s) Oral every 8 hours PRN Moderate Pain (4 - 6)  dextrose 40% Gel 15 Gram(s) Oral once PRN Blood Glucose LESS THAN 70 milliGRAM(s)/deciliter  glucagon  Injectable 1 milliGRAM(s) IntraMuscular once PRN Glucose LESS THAN 70 milligrams/deciliter  lidocaine 2% Gel 1 Application(s) Topical two times a day PRN pain around PEG site  ondansetron Injectable 4 milliGRAM(s) IV Push every 6 hours PRN Nausea and/or Vomiting  sodium chloride 0.9% lock flush 10 milliLiter(s) IV Push every 1 hour PRN Pre/post blood products, medications, blood draw, and to maintain line patency Interval events and subjective: Patient was set to be discharged to [facility] today, however spiked fever to 102 oral, tachy to 110s in the afternoon. Patient reports continued cough. Denies any CP, abdominal pain, no stool from vagina, no headache, dizziness. Has occasional nausea controlled w zofran.     After fever, stat labs sent including CBC, BMP, blood cx, urine cx, sputum cx, RVP, lactate, type and screen, and coags. Lactate 1. WBC increased to 15. fever and tachycardia improved w tylenol. Discharge cancelled pending further workup.    Vital Signs Last 12 Hrs  T(F): 100.3 (19 @ 14:30), Max: 102 (19 @ 13:50)  HR: 112 (19 @ 13:50) (71 - 112)  BP: 110/65 (19 @ 13:50) (110/65 - 136/84)  BP(mean): --  RR: 16 (19 @ 13:50) (16 - 25)  SpO2: 98% (19 @ 13:50) (98% - 100%)  I&O's Summary    2019 07:01  -  2019 07:00  --------------------------------------------------------  IN: 756 mL / OUT: 2250 mL / NET: -1494 mL        PHYSICAL EXAM (at time of fever):  Constitutional: NAD, comfortable in bed on vent w occasional coughing  HEENT: NC/AT, PERRLA, EOMI, no conjunctival pallor or scleral icterus, MMM, tracheostomy in place, no signs of infection  Neck: Supple, no tenderness  Respiratory: on ACVC, Normal rate, rhythm, depth, effort. rhonchorus R>L however likely contribution of upper airway sounds from trach/vent.   Cardiovascular: tachycardic, regular rhythm, normal S1 and S2, no m/r/g.   Gastrointestinal: +BS, soft NTND, no guarding or rebound tenderness, large midline scar, L colostomy bag w green liquid stool, PEG in place without signs of infection or tenderness. no suprapubic tenderness or CVA tenderness  : joseph in place  Extremities: wwp; no cyanosis, clubbing or edema.   Vascular: Pulses equal and strong throughout. R arm PICC line, L subclavian HD cath, and R chest wall chemoport all without erythema or tenderness to palpation  Neurological: AAOx3, no CN deficits, strength 5/5 in UEs, 1/5 in LEs   Skin: No gross skin abnormalities or rashes. previously had a sacral decub ulcer that is now healed        LABS:                        7.5    15.63 )-----------( 314      ( 2019 14:56 )             24.1     06-17    135  |  99  |  36<H>  ----------------------------<  154<H>  4.1   |  24  |  0.67    Ca    10.0      2019 14:57  Phos  4.2     06-16  Mg     2.0     06-16    TPro  7.3  /  Alb  3.7  /  TBili  0.3  /  DBili  x   /  AST  21  /  ALT  14  /  AlkPhos  111  06-17      Urinalysis Basic - ( 2019 14:59 )    Color: Yellow / Appearance: Clear / S.020 / pH: x  Gluc: x / Ketone: NEGATIVE  / Bili: Negative / Urobili: 0.2 E.U./dL   Blood: x / Protein: 100 mg/dL / Nitrite: NEGATIVE   Leuk Esterase: Large / RBC: > 10 /HPF / WBC Many /HPF   Sq Epi: x / Non Sq Epi: 0-5 /HPF / Bacteria: Present /HPF        RADIOLOGY & ADDITIONAL TESTS:    MEDICATIONS  (STANDING):  albumin human  5% IVPB 2750 milliLiter(s) IV Intermittent once  albumin human  5% IVPB 2750 milliLiter(s) IV Intermittent once  ALBUTerol/ipratropium for Nebulization 3 milliLiter(s) Nebulizer every 6 hours  calcium gluconate IVPB 1 Gram(s) IV Intermittent once  calcium gluconate IVPB 1 Gram(s) IV Intermittent once  chlorhexidine 0.12% Liquid 15 milliLiter(s) Oral Mucosa every 12 hours  chlorhexidine 2% Cloths 1 Application(s) Topical <User Schedule>  dextrose 5%. 1000 milliLiter(s) (50 mL/Hr) IV Continuous <Continuous>  dextrose 50% Injectable 25 Gram(s) IV Push once  enoxaparin Injectable 40 milliGRAM(s) SubCutaneous every 24 hours  fat emulsion (Plant Based) 20% IVPB 50 Gram(s) IV Intermittent once  fludroCORTISONE 0.1 milliGRAM(s) Oral every 24 hours  heparin 1000 units/mL 2000 Unit(s) 2000 Unit(s) IV Push once  heparin 1000units/mL 4000 Unit(s) 4000 Unit(s) IV Push once  insulin regular  human corrective regimen sliding scale   SubCutaneous every 6 hours  LORazepam     Tablet 0.5 milliGRAM(s) Oral every 8 hours  midodrine 5 milliGRAM(s) Oral every 8 hours  morphine  - Injectable 1 milliGRAM(s) IV Push once  morphine  - Injectable 2 milliGRAM(s) IV Push once  nystatin Cream 1 Application(s) Topical two times a day  Parenteral Nutrition - Adult 1 Each (63 mL/Hr) TPN Continuous <Continuous>  Parenteral Nutrition - Adult 1 Each (63 mL/Hr) TPN Continuous <Continuous>  sodium chloride 3%  Inhalation 3 milliLiter(s) Inhalation every 4 hours    MEDICATIONS  (PRN):  acetaminophen    Suspension .. 1000 milliGRAM(s) Oral every 8 hours PRN Moderate Pain (4 - 6)  dextrose 40% Gel 15 Gram(s) Oral once PRN Blood Glucose LESS THAN 70 milliGRAM(s)/deciliter  glucagon  Injectable 1 milliGRAM(s) IntraMuscular once PRN Glucose LESS THAN 70 milligrams/deciliter  lidocaine 2% Gel 1 Application(s) Topical two times a day PRN pain around PEG site  ondansetron Injectable 4 milliGRAM(s) IV Push every 6 hours PRN Nausea and/or Vomiting  sodium chloride 0.9% lock flush 10 milliLiter(s) IV Push every 1 hour PRN Pre/post blood products, medications, blood draw, and to maintain line patency

## 2019-06-17 NOTE — PROGRESS NOTE ADULT - PROBLEM SELECTOR PLAN 3
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  - C/w plan as per above problem 1 & 2  - Neurology following  - plasmapheresis as in problem #1 Pt had previously been on levophed for autonomic dysfunction 2/2 AIDP. Initial concern for adrenal insufficiency, stim test negative.  - Continue florinef 0.1mg qd   - C/w midodrine 10mg TID

## 2019-06-17 NOTE — PROGRESS NOTE ADULT - PROBLEM SELECTOR PLAN 8
Hx of urinary retention/overflow incontinence   - Joseph changed on 5/17   - ct abdomen shows mild L hydro w obstructing stone on 6/6  - per urology, continue w joseph  - kidney and bladder US with 0.7cm stone in L kidney but no hydronephrosis    #PEG placement  - Pt s/p PEG placement 5/21   - flushes well per nursing. GI loosened bumper. 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 GYN to discuss length of therapy. -IVF: none  -Monitor, Replete to K>4 and Mg>2  -Diet: continue TPN given enterovaginal fistula  -DVT: Lovenox, SCDs

## 2019-06-17 NOTE — DISCHARGE NOTE NURSING/CASE MANAGEMENT/SOCIAL WORK - NSDCFUADDAPPT_GEN_ALL_CORE_FT
Please return to [facility] on July 1st, 2019 for your next round of plasmapheresis. You will be admitted under Dr. Sellers. The [facility] should call Dr. Sellers on 6/30 in preparation for your admission. His office phone number is [phone].

## 2019-06-17 NOTE — PROGRESS NOTE ADULT - PROBLEM SELECTOR PLAN 6
Originally presented w/ septic shock of  source w/ bacteremia requiring pressor support and intubation -- shock now resolved. Cleared blood cultures on ABx.  - has completed antibiotic course  -also completed coarse of antibiotics following recurrence of fever and leukocytosis thoguht to be MRSA in sputum as cause  -now on vanco and zosyn after rapid response for possible tracheitis Originally presented w/ septic shock of  source w/ bacteremia requiring pressor support and intubation -- shock now resolved. Cleared blood cultures on ABx.  - has completed antibiotic course  -also completed coarse of antibiotics following recurrence of fever and leukocytosis thoguht to be MRSA in sputum as cause  -Finished  vanco and zosyn after rapid response for possible tracheitis

## 2019-06-17 NOTE — PROGRESS NOTE ADULT - ASSESSMENT
60 yo F PMHx polio, breast cancer, DM, Endometrial Cancer s/p exploratory laparotomy, enterolysis, SHAMIR, BSO, pelvic lymphadenectomy, low anterior resection mobilization of splenic flexure, end colostomy on 7/30/18, rectovaginal fistula, numerous admissions for urinary tract infection presented to [facility] in the setting of urosepsis. Hospital course complicated by ESBL E coli bacteremia (completed ertapenem on 5/14 ) and respiratory failure requiring multiple intubations 2/2 AIDP, now s/p trach and PEG (5/21), s/p treatment for sepsis 2/2 MRSA PNA w/ linezolid. Now on vanco/zosyn for tracheitis.  Now s/p 2nd round of plasmapheresis. Now with re-opening of enteric-vaginal fistula, on strict NPO requiring TPN. Now stable for [facility]O pending bed acceptance. 60 yo F PMHx polio, breast cancer, DM, Endometrial Cancer s/p exploratory laparotomy, enterolysis, SHAMIR, BSO, pelvic lymphadenectomy, low anterior resection mobilization of splenic flexure, end colostomy on 7/30/18, rectovaginal fistula, numerous admissions for urinary tract infection presented to [facility] in the setting of urosepsis. Hospital course complicated by ESBL E coli bacteremia (completed ertapenem on 5/14 ) and respiratory failure requiring multiple intubations 2/2 AIDP, now s/p trach and PEG (5/21), s/p treatment for sepsis 2/2 MRSA PNA w/ linezolid. Now on vanco/zosyn for tracheitis.  Now s/p 2nd round of plasmapheresis. Now with re-opening of enteric-vaginal fistula, on strict NPO requiring TPN. New sepsis on 6/17 unclear source.

## 2019-06-17 NOTE — DISCHARGE NOTE NURSING/CASE MANAGEMENT/SOCIAL WORK - NSDCDPATPORTLINK_GEN_ALL_CORE
You can access the [facility] Patient Portal, offered by [facility], by registering with the following website: [email or web address]

## 2019-06-17 NOTE — DISCHARGE NOTE NURSING/CASE MANAGEMENT/SOCIAL WORK - NSDCPEFALRISK_GEN_ALL_CORE
"""Call if vision decreases or RD warning signs increases/RD warnings given.  No signs of retinal tear Patient information on fall and injury prevention

## 2019-06-17 NOTE — PROGRESS NOTE ADULT - PROBLEM SELECTOR PLAN 3
responded to plasmapheresis with increased strength in upper body  - prognosis overall remains guarded given her multiple chronic comorbidities, the prospect of cytotoxic chemotherapy initiation for her malignancy per GYN, and now on ventilation and Peg-tube.  Peg-tube feeding was stopped due to detection of enterovaginal fistula on CT scan yesterday. Wants to continue with treatment responded to plasmapheresis with increased strength in upper body  - prognosis overall remains guarded given her multiple chronic comorbidities, the prospect of cytotoxic chemotherapy initiation for her malignancy per GYN, and now on ventilation and Peg-tube.  Peg-tube feeding was stopped due to detection of enterovaginal fistula on CT scan . Started back on TPN last week. Wants to continue with treatment

## 2019-06-17 NOTE — PROGRESS NOTE ADULT - ATTENDING COMMENTS
Patient was seen and examined with the resident team today.  I agree with Dr. Vincent's assessment and plan with the following exceptions/additions:     Briefly, this is a 60yo woman with a PMH of polio c/b post-polio syndrome, breast cancer, endometrial cancer s/p explap, enterolysis, SHAMIR, BSO, pelvic lymphadenectomy, low anterior resection mobilization of splenic flexure and end colostomy (7/30/18), rectovaginal fistula, as well as, numerous admissions for urinary tract infections who was initially admitted for ESBL E. coli bacteremia c/b septic shock, now s/p a protracted hospital course.  Admission has been complicated by acute (now chronic) hypercapnic respiratory failure requiring multiple intubations 2/2 AIDP (s/p IVIG and plasmapheresis), now s/p trach and PEG ( 5/21).  Latter part of hospitalization developed another episode of sepsis 2/2 MRSA HAP/VAP and VRE UTI.  Last day of antibiotics on 5/30.  Patient was nearing discharge on 5/31, but her discharge was delayed 2/2 difficult outpatient coordination of treatment.  She underwent plasmapheresis x2 the week of 6/31 without complication but did require 1U PRBC given her chronic anemia w/superimposed daily phlebotomy.  She was subsequently noted to have re-opening of her enterovaginal fistula, prompting initiation of TPN, as well as tracheitis (i/s/o a rapid response) resulting in initiation and completion of a course of Vancomycin.  Since then she remained clinically stable until hours prior to discharge to [facility] today, when she was noted to be anxious and then lethargic i/s/o a high-grade fever this afternoon.      -- f/u fever work-up  -- low-threshold to restart abx with MRSA and Pseu coverage  -- monitor RUE PICC  -- pending above, may need to hold TPN  -- will postpone discharge today     Maya Christian  [phone]

## 2019-06-17 NOTE — PROGRESS NOTE ADULT - PROBLEM SELECTOR PLAN 9
-IVF: none  -Monitor, Replete to K>4 and Mg>2  -Diet: continue TPN given enterovaginal fistula  -DVT: Lovenox, SCDs 1) PCP Contacted on Admission: (Y/N) --> Name & Phone #: Dr. Patricio Quinn  2) Date of Contact with PCP:  3) PCP Contacted at Discharge: (Y/N, N/A)  4) Summary of Handoff Given to PCP:   5) Post-Discharge Appointment Date and Location:

## 2019-06-17 NOTE — PROGRESS NOTE ADULT - PROBLEM SELECTOR PLAN 7
Stage IV endometrial adenocarcinoma; Per GYN-ONC w/ interval increase in tumor burden. Pt was treated with Anastrazole and initiated 3 weeks of megace 80mg BID followed by 3 weeks of tamoxifen. Pt was on tamoxifen and developed a left cephalic v. thrombus. Tamoxifen was dc'd on 5/17   - Per GYN, no anticipation of continuing chemotherapy    #Anxiety   - 2/2 ongoing medical issues and prognosis  - remeron 15mg qhs prn for insomnia Hx of urinary retention/overflow incontinence   - Joseph changed on 5/17   - ct abdomen shows mild L hydro w obstructing stone on 6/6  - per urology, continue w joseph  - kidney and bladder US with 0.7cm stone in L kidney but no hydronephrosis    #PEG placement  - Pt s/p PEG placement 5/21   - flushes well per nursing. GI loosened bumper. pain improved     #rectovaginal fistula  - per GYN onc, hx of recto vaginal fistula that resolved spontaneously, however has recurred. currently no leakage of stool but continuing w tpn for now  - confirmed entero vaginal fistula on CT pelvis  - appreciate gyn recs  -NPO  -TPN for now.

## 2019-06-17 NOTE — CHART NOTE - NSCHARTNOTEFT_GEN_A_CORE
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  Pleural effusion  Muscle weakness: generalized  Malignant neoplasm: endometrium  History of total abdominal hysterectomy and bilateral salpingo-oophorectomy: with resection of endometrial mass, low anterior resection and pelvic lymphadenectomy      Current Nutrition Order:  TPN via central line:  258g Dex, 80g AA, 50g 20% Lipids to provide in total 1700 Kcal, 80 g protein, 3.36GIR ,1.5 g/kg ABW protein    PO Intake: Good (%) [   ]  Fair (50-75%) [   ] Poor (<25%) [   ]- n/a     GI Issues: +ostomy, +nausea, no noted v/c    Pain: none     Skin Integrity: stage 2 pressure ulcers per prior skin record, not updated     Labs:   06-16    136  |  98  |  34<H>  ----------------------------<  162<H>  4.1   |  24  |  0.58    Ca    10.0      16 Jun 2019 07:03  Phos  4.2     06-16  Mg     2.0     06-16      CAPILLARY BLOOD GLUCOSE      POCT Blood Glucose.: 183 mg/dL (17 Jun 2019 12:10)  POCT Blood Glucose.: 156 mg/dL (17 Jun 2019 05:30)  POCT Blood Glucose.: 179 mg/dL (16 Jun 2019 23:49)  POCT Blood Glucose.: 144 mg/dL (16 Jun 2019 17:36)      Medications:  MEDICATIONS  (STANDING):  albumin human  5% IVPB 2750 milliLiter(s) IV Intermittent once  albumin human  5% IVPB 2750 milliLiter(s) IV Intermittent once  ALBUTerol/ipratropium for Nebulization 3 milliLiter(s) Nebulizer every 6 hours  calcium gluconate IVPB 1 Gram(s) IV Intermittent once  calcium gluconate IVPB 1 Gram(s) IV Intermittent once  chlorhexidine 0.12% Liquid 15 milliLiter(s) Oral Mucosa every 12 hours  chlorhexidine 2% Cloths 1 Application(s) Topical <User Schedule>  dextrose 5%. 1000 milliLiter(s) (50 mL/Hr) IV Continuous <Continuous>  dextrose 50% Injectable 25 Gram(s) IV Push once  enoxaparin Injectable 40 milliGRAM(s) SubCutaneous every 24 hours  fat emulsion (Plant Based) 20% IVPB 50 Gram(s) IV Intermittent once  fludroCORTISONE 0.1 milliGRAM(s) Oral every 24 hours  heparin 1000 units/mL 2000 Unit(s) 2000 Unit(s) IV Push once  heparin 1000units/mL 4000 Unit(s) 4000 Unit(s) IV Push once  insulin regular  human corrective regimen sliding scale   SubCutaneous every 6 hours  LORazepam     Tablet 0.5 milliGRAM(s) Oral every 8 hours  midodrine 5 milliGRAM(s) Oral every 8 hours  morphine  - Injectable 1 milliGRAM(s) IV Push once  morphine  - Injectable 2 milliGRAM(s) IV Push once  nystatin Cream 1 Application(s) Topical two times a day  Parenteral Nutrition - Adult 1 Each (63 mL/Hr) TPN Continuous <Continuous>  Parenteral Nutrition - Adult 1 Each (63 mL/Hr) TPN Continuous <Continuous>  sodium chloride 3%  Inhalation 3 milliLiter(s) Inhalation every 4 hours    MEDICATIONS  (PRN):  acetaminophen    Suspension .. 1000 milliGRAM(s) Oral every 8 hours PRN Moderate Pain (4 - 6)  dextrose 40% Gel 15 Gram(s) Oral once PRN Blood Glucose LESS THAN 70 milliGRAM(s)/deciliter  glucagon  Injectable 1 milliGRAM(s) IntraMuscular once PRN Glucose LESS THAN 70 milligrams/deciliter  lidocaine 2% Gel 1 Application(s) Topical two times a day PRN pain around PEG site  ondansetron Injectable 4 milliGRAM(s) IV Push every 6 hours PRN Nausea and/or Vomiting  sodium chloride 0.9% lock flush 10 milliLiter(s) IV Push every 1 hour PRN Pre/post blood products, medications, blood draw, and to maintain line patency      Weight:  57kg (4/29)   53.3kg    Weight Change: please continue to trend wts/ while on TPN     Nutrition Focused Physical Exam: Completed [   ]  Not Pertinent [ x  ]    Estimated energy needs:   ABW used for calculations as pt between % of IBW.   ABW 53.3kg, IBW 47kg, 113% IBW, ht 61", BMI 27.2   Nutrient needs based on [facility] standards of care for maintenance in older adults.   needs adjusted for age, PU stage 2, catabolic illness, vent  1599-1865kcal/day (30-35kcal/kg)  74-85g pro/day (1.4-1.6g/kg)  1599-1865ml fluid/day (30-35ml/kg)    Subjective:   55yo F w known stage IV endometrial cancer, likely with progression of disease, complex fistulae, chronic indwelling [place] admitted with fever and urosepsis. Pt intubated 2/2 acute hypoxic respiratory failure, and successfully extubated on 5/2. Pt transferred to [facility], and started on PO diet. Required reintubation on 5/8 2/2 CO2 narcosis likely d/t respiratory muscle insufficiency/ GBS/ AIDP. S/p IVIG and s/p LP. Pt extubated on 5/10, though reintubated on 5/13 for CO2 narcosis. Pt was initially declining trach/PEG and was going to be extubated, though she changed her mind and trach/PEG placed on 5/21. GOC discussion 5/23, pt made DNR/DNI however wanting to pursue nuero treatment. Moved to [facility] on 5/25 for further monitoring. Pt trached to vent was on CPAP attempting to be weaned off, trach collar trial pending as pt with episodes of apnea on vent. Able to tolerate CPAP intermittently however continues with apnea, vent placed back on ACVC mode, continues to have difficulty being weaned, now able to tolerate CPAP for longer periods. Pt was stable for DC to LTAC, accepted to facilities however facilities do not do IVIG which pt requires, now pending additional options and facility for DC. Per aisha alternative option is HD cath placement with plasmapharesis [id number] for best results, per discussion in pal care rounds LTAC does not hold beds, pt will need to be readmitted to get treatment then DC back to facility same day, unclear of duration of treatment. EN stopped 6/3 d/t fistula. HD cath placed 6/5. Now per GYN recto vaginal fistula noted and confirmed 6/6, has had in past and resolved spontaneously.  Currently on bowel rest/ TPN while fistula resolves. Tolerating TPN well at this time, lytes remain WNL. Pt remains stable at this time, pain around PEG resolved/ breathing improved - on CPAP mode. No further stool noted from vagina, continues on TPN. Now accepted to LTAC with plans for DC today and will return July 1st for plasmapharesis, further w/u now showing possible GBS, plan to treat then address cancer per report. Will continue to follow per RD protocol.     Previous Nutrition Diagnosis: Increased nutrient needs RT increased demand for kcal/pro AEB wt loss, pressure ulcer, catabolic illness     Active [x   ]  Resolved [   ]    If resolved, new PES:     Goal:  Pt to consistently meet % of estimated needs via tolerated route     Recommendations:  1. Restart EN as feasible (Osmolite 1.2 Bola @ 44mL/hr x 24hrs plus 1 ProStat via PEG. Provides: 1056mL TV, 1367kcal, 74g protein, 866mL free H2O, 106% RDI, 1.55g/kg IBW protein, 22.5 non pro kcal)   2. C/w TPN. Recommend TPN via central line:  258g Dex, 80g AA, 50g 20% Lipids to provide in total 1700 Kcal, 80 g protein, 3.36GIR ,1.5 g/kg ABW protein  Recommend checking TG before starting TPN and then check TG weekly. Check Mg, Phos, K daily and POC BG Q6hrs. Trend daily weights. Fluids and lytes per MD discretion.   3. Monitor and replete lytes prn   4. Trend wts     Education: n/a no ed at this time     Risk Level: High [ x  ] Moderate [   ] Low [   ]

## 2019-06-17 NOTE — PROGRESS NOTE ADULT - PROBLEM SELECTOR PLAN 6
Iron panel showing ACD. s/p 1u pRBC's 5/18, 1u pRBC's 5/23, and 1u pRBCs on 6/3  - Currently no source of bleeding or hemolysis  - Maintain active T&S Stage IV endometrial adenocarcinoma; Per GYN-ONC w/ interval increase in tumor burden. Pt was treated with Anastrazole and initiated 3 weeks of megace 80mg BID followed by 3 weeks of tamoxifen. Pt was on tamoxifen and developed a left cephalic v. thrombus. Tamoxifen was dc'd on 5/17   - Per GYN, no anticipation of continuing chemotherapy    #Anxiety   - 2/2 ongoing medical issues and prognosis  - remeron 15mg qhs prn for insomnia

## 2019-06-17 NOTE — PROGRESS NOTE ADULT - PROBLEM SELECTOR PLAN 2
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 Initial concern for adrenal insufficiency, stim test negative.  - Continue florinef 0.1mg qd   - C/w midodrine 10mg TID 2/2 to demyelinating polyneuropathy (AIDP)   s/p trach (5/21) and s/p 4 doses of IVIG finished 5/11 and 5 rounds of plasmapheresis (finished 5/21), additional 2 rounds of plasmapheresis finished on 6/6  - C/w mechanical ventilation at night and cpap during day. wean as tolerated  - C/w frequent suctioning (q4h)  - pulm consult for increased secretions and possible mucus plugging - duonebs q6h standing and added vanc and zosyn for tracheitis (s/p 7 day course ended on 6/11)  - Per neurology, patient will require 2 rounds of plasmapheresis qMonthly for 3 months. Then neurology will re-evaluate need for more treatment.  - Plan for patient to return to [facility] on June 30th at noon for next round of plasmapheresis. [facility] physician should call to give signout to Dr. Sellers (admitting attending) on day prior to admission. Dr. Sellers can be reached at [phone].

## 2019-06-17 NOTE — PROGRESS NOTE ADULT - PROBLEM SELECTOR PLAN 1
2/2 to demyelinating polyneuropathy (AIDP)   s/p trach (5/21) and s/p 4 doses of IVIG finished 5/11 and 5 rounds of plasmapheresis (finished 5/21), additional 2 rounds of plasmapheresis finished on 6/6  - C/w mechanical ventilation at night and cpap during day. wean as tolerated  - C/w frequent suctioning (q4h)  - pulm consult for increased secretions and possible mucus plugging - duonebs q6h standing and added vanc and zosyn for tracheitis (s/p 7 day course ended on 6/11)  - Per neurology, patient will require 2 rounds of plasmapheresis qMonthly for 3 months. Then neurology will re-evaluate need for more treatment.  - Plan for patient to return to [facility] on June 30th at noon for next round of plasmapheresis. [facility] physician should call to give signout to Dr. Sellers (admitting attending) on day prior to admission. Dr. Sellers can be reached at [phone]. patient meeting 3/4 SIRS on 6/17 w fever, tachycardia, and leukocytosis. lactate neg. Possible sources include VAP, line infection (PICC, HD cath, chemoport), UTI (has joseph). Viral less likely given negative RVP, GI source less likely given benign abdominal exam  - CXR 6/17 without new consolidation. looks improved from prior. however is rhonchorus and has a cough  - UA positive however has chronic joseph. No sp or CVA tenderness on exam  - lines are concerning however no tenderness on exam  - will hold off on abx at this time. If spikes fever again overnight, then will start vanc, zosyn, and aminoglycoside to cover for VAP, especially given hx of MRSA pna and ESBL ecoli bacteremia (sensitive to ertapenem earlier on in admission)  - f/u blood cx  - f/u urine cx  - f/u sputum cx  - if blood cultures positive, then will need HD cath and PICC removed stat

## 2019-06-17 NOTE — PROGRESS NOTE ADULT - PROBLEM SELECTOR PLAN 5
Resolved. hx of recurrent ESBL ecoli UTI and bacteremia. was initially admitted for septic shock 2/2 to ESBL ecoli bacteremia and UTI  - Completed tx w/ ertapenem on 5/14 Iron panel showing ACD. s/p 1u pRBC's 5/18, 1u pRBC's 5/23, and 1u pRBCs on 6/3  - Currently no source of bleeding or hemolysis  - Maintain active T&S

## 2019-06-17 NOTE — PROGRESS NOTE ADULT - PROBLEM SELECTOR PLAN 9
had a rapid response yesterday. Weaning failed before and now she is on vent. Wishes to continue aggressive measures Wishes to continue aggressive measures

## 2019-06-17 NOTE — PROGRESS NOTE ADULT - ASSESSMENT
58yo F HD49 with stage IV endometrial adenocarcinoma s/p staging surgery '18 and 1 round of chemotherapy 2/19 readmitted from  [facility] with urosepsis and poor respiratory status which is now thought to be secondary to diagnosis of Guillain Otley Syndrome/AIDP.  Hospital course notable for prolonged intubation and MICU admission, transitioned to tracheostomy and PEG tube on 5/21, now weened off pressors and transferred to chronic vent unit on 4Uris. Enterovaginal fistula now stable- no stool output from vagina today. Continue to treat conservatively by bowel rest and TPN. Pt had a fever of 102 F at 1350 today and has been tachycardic. Fever workup.   Primary management per Medicine.     -ID: Fever- Lactate 1, WBC elevated to 15.63 Follow up urine culture, blood culture, RVP, chest x-ray   -Fistula: manage conservatively w/bowel rest. Continue TPN and holding tube feeds. Continue to monitor output per vagina.   -: Neurogenic bladder w/indwelling joseph in place. Follow up urine culture   -GYN malignancy  s/p Anastrazole, s/p Megace 80mg BID x3 wks , s/p Tamoxifen 5/1-5/17 (stopped due to upper extremity DVT). pathology serous on review of pathology, when stable for genetic counselor, Once patient recovers from acute neurologic issue will reassess cancer treatment options, - ID: suspected tracheitis - now s/p zosyn and vancomycin. Currently tracking down initial pathology report from [facility] - pending in [facility] path lab  Will avoid immunotherapy at this time given associated risk of autoimmune disease   - Social work/palliative:  DNR/DNI. Interdisciplinary meeting was done on 5/23

## 2019-06-17 NOTE — PROGRESS NOTE ADULT - PROBLEM SELECTOR PROBLEM 10
Transition of care performed with sharing of clinical summary
Prophylactic measure
Transition of care performed with sharing of clinical summary
Transition of care performed with sharing of clinical summary
Palliative care by specialist
Transition of care performed with sharing of clinical summary
Palliative care by specialist
Transition of care performed with sharing of clinical summary
Palliative care by specialist
Transition of care performed with sharing of clinical summary
Advance care planning
Hypomagnesemia

## 2019-06-17 NOTE — PROGRESS NOTE ADULT - PROBLEM SELECTOR PLAN 4
Resolved  Pt spiked to 101.6  on 5/22. UA  positive, UCx growing Enterococcus and Bronch wash growing MRSA; Was on vancomycin ( 5/22- 5/24) d/c due to Cx positive for VRE.  - s/p linezolid for a total of 7 days (5/24 - 5/30)   - BCx 5/22 NGTD Patient w AIDP leading to respiratory compromise, previous EMG w demyelinating polyneuropathy.  - C/w plan as per above problem 1 & 2  - Neurology following  - plasmapheresis as in problem #1

## 2019-06-17 NOTE — PROGRESS NOTE ADULT - PROBLEM SELECTOR PLAN 8
rectovaginal and enterovaginal fistulas developed secondary to problem #6 and staging surgery in Fall 2018  -  evaluated and discussed option of supra-pubic catheter that may decrease, but not eliminate her risk/propensity for recurrent urinary infections. Management in this respect has been deferred 2/2 her more acute problems as described above.  -re-demonstration of fistula on CT of abdomen yesterday. Feeding on hold. Will be on TPN starting tonight rectovaginal and enterovaginal fistulas developed secondary to problem #6 and staging surgery in Fall 2018  -  evaluated and discussed option of supra-pubic catheter that may decrease, but not eliminate her risk/propensity for recurrent urinary infections. Management in this respect has been deferred 2/2 her more acute problems as described above.  -re-demonstration of fistula on CT of abdomen . Feeding on hold. Will be on TPN starting tonight

## 2019-06-18 PROCEDURE — 99233 SBSQ HOSP IP/OBS HIGH 50: CPT

## 2019-06-18 PROCEDURE — 99232 SBSQ HOSP IP/OBS MODERATE 35: CPT | Mod: GC

## 2019-06-18 RX ORDER — I.V. FAT EMULSION 20 G/100ML
0.94 EMULSION INTRAVENOUS
Qty: 50 | Refills: 0 | Status: DISCONTINUED | OUTPATIENT
Start: 2019-06-18 | End: 2019-06-18

## 2019-06-18 RX ORDER — ELECTROLYTE SOLUTION,INJ
1 VIAL (ML) INTRAVENOUS
Refills: 0 | Status: DISCONTINUED | OUTPATIENT
Start: 2019-06-18 | End: 2019-06-18

## 2019-06-18 RX ADMIN — Medication 3 MILLILITER(S): at 12:36

## 2019-06-18 RX ADMIN — CHLORHEXIDINE GLUCONATE 15 MILLILITER(S): 213 SOLUTION TOPICAL at 22:21

## 2019-06-18 RX ADMIN — SODIUM CHLORIDE 3 MILLILITER(S): 9 INJECTION INTRAMUSCULAR; INTRAVENOUS; SUBCUTANEOUS at 01:37

## 2019-06-18 RX ADMIN — NYSTATIN CREAM 1 APPLICATION(S): 100000 CREAM TOPICAL at 05:58

## 2019-06-18 RX ADMIN — Medication 0.5 MILLIGRAM(S): at 09:15

## 2019-06-18 RX ADMIN — Medication 3 MILLILITER(S): at 00:00

## 2019-06-18 RX ADMIN — Medication 3 MILLILITER(S): at 06:01

## 2019-06-18 RX ADMIN — SODIUM CHLORIDE 3 MILLILITER(S): 9 INJECTION INTRAMUSCULAR; INTRAVENOUS; SUBCUTANEOUS at 22:21

## 2019-06-18 RX ADMIN — Medication 3 MILLILITER(S): at 18:16

## 2019-06-18 RX ADMIN — CHLORHEXIDINE GLUCONATE 15 MILLILITER(S): 213 SOLUTION TOPICAL at 09:15

## 2019-06-18 RX ADMIN — MIDODRINE HYDROCHLORIDE 5 MILLIGRAM(S): 2.5 TABLET ORAL at 22:21

## 2019-06-18 RX ADMIN — SODIUM CHLORIDE 3 MILLILITER(S): 9 INJECTION INTRAMUSCULAR; INTRAVENOUS; SUBCUTANEOUS at 05:58

## 2019-06-18 RX ADMIN — MIDODRINE HYDROCHLORIDE 5 MILLIGRAM(S): 2.5 TABLET ORAL at 05:58

## 2019-06-18 RX ADMIN — NYSTATIN CREAM 1 APPLICATION(S): 100000 CREAM TOPICAL at 18:31

## 2019-06-18 RX ADMIN — MIDODRINE HYDROCHLORIDE 5 MILLIGRAM(S): 2.5 TABLET ORAL at 14:12

## 2019-06-18 RX ADMIN — I.V. FAT EMULSION 20.83 GM/KG/DAY: 20 EMULSION INTRAVENOUS at 18:16

## 2019-06-18 RX ADMIN — INSULIN HUMAN 2: 100 INJECTION, SOLUTION SUBCUTANEOUS at 06:10

## 2019-06-18 RX ADMIN — CHLORHEXIDINE GLUCONATE 1 APPLICATION(S): 213 SOLUTION TOPICAL at 05:58

## 2019-06-18 RX ADMIN — SODIUM CHLORIDE 3 MILLILITER(S): 9 INJECTION INTRAMUSCULAR; INTRAVENOUS; SUBCUTANEOUS at 14:12

## 2019-06-18 RX ADMIN — INSULIN HUMAN 2: 100 INJECTION, SOLUTION SUBCUTANEOUS at 22:20

## 2019-06-18 RX ADMIN — Medication 0.5 MILLIGRAM(S): at 01:05

## 2019-06-18 RX ADMIN — SODIUM CHLORIDE 3 MILLILITER(S): 9 INJECTION INTRAMUSCULAR; INTRAVENOUS; SUBCUTANEOUS at 09:15

## 2019-06-18 RX ADMIN — ENOXAPARIN SODIUM 40 MILLIGRAM(S): 100 INJECTION SUBCUTANEOUS at 18:45

## 2019-06-18 RX ADMIN — FLUDROCORTISONE ACETATE 0.1 MILLIGRAM(S): 0.1 TABLET ORAL at 05:58

## 2019-06-18 RX ADMIN — INSULIN HUMAN 2: 100 INJECTION, SOLUTION SUBCUTANEOUS at 12:36

## 2019-06-18 RX ADMIN — Medication 1 EACH: at 18:15

## 2019-06-18 RX ADMIN — SODIUM CHLORIDE 3 MILLILITER(S): 9 INJECTION INTRAMUSCULAR; INTRAVENOUS; SUBCUTANEOUS at 18:16

## 2019-06-18 NOTE — PROGRESS NOTE ADULT - PROBLEM SELECTOR PLAN 2
2/2 to demyelinating polyneuropathy (AIDP)   s/p trach (5/21) and s/p 4 doses of IVIG finished 5/11 and 5 rounds of plasmapheresis (finished 5/21), additional 2 rounds of plasmapheresis finished on 6/6  - C/w mechanical ventilation at night and cpap during day. wean as tolerated  - C/w frequent suctioning (q4h)  - pulm consult for increased secretions and possible mucus plugging - duonebs q6h standing and added vanc and zosyn for tracheitis (s/p 7 day course ended on 6/11)  - Per neurology, patient will require 2 rounds of plasmapheresis qMonthly for 3 months. Then neurology will re-evaluate need for more treatment.  - Plan for patient to return to [facility] on July 1st at noon for next round of plasmapheresis. [facility] physician should call to give signout to Dr. Sellers (admitting attending) on day prior to admission. Dr. Sellers can be reached at [phone].

## 2019-06-18 NOTE — PROGRESS NOTE ADULT - ATTENDING COMMENTS
Patient was seen and examined with the resident team today.  I agree with Dr. Vincent's assessment and plan with the following exceptions/additions:     Briefly, this is a 58yo woman with a PMH of polio c/b post-polio syndrome, breast cancer, endometrial cancer s/p explap, enterolysis, SHAMIR, BSO, pelvic lymphadenectomy, low anterior resection mobilization of splenic flexure and end colostomy (7/30/18), rectovaginal fistula, as well as, numerous admissions for urinary tract infections who was initially admitted for ESBL E. coli bacteremia c/b septic shock, now s/p a protracted hospital course.  Admission has been complicated by acute (now chronic) hypercapnic respiratory failure requiring multiple intubations 2/2 AIDP (s/p IVIG and plasmapheresis), now s/p trach and PEG ( 5/21).  Latter part of hospitalization developed another episode of sepsis 2/2 MRSA HAP/VAP and VRE UTI.  Last day of antibiotics on 5/30.  Patient was nearing discharge on 5/31, but her discharge was delayed 2/2 difficult outpatient coordination of treatment.  She underwent plasmapheresis x2 the week of 6/31 without complication but did require 1U PRBC given her chronic anemia w/superimposed daily phlebotomy.  She was subsequently noted to have re-opening of her enterovaginal fistula, prompting initiation of TPN, as well as tracheitis (i/s/o a rapid response) resulting in initiation and completion of a course of Vancomycin.      **Since then she remained clinically stable for several days awaiting [facility] acceptance, until hours prior to discharge to [facility] on 6/17, when she was noted to be anxious and then lethargic i/s/o a high-grade fever x2.  Fever work-up thus far has been largely unremarkable and now afebrile nearly 24 hours.  Today, voices no infectious complaints and notes she feels well; however, tearful when mentioning [facility] and not having been able to leave yesterday.       -- f/u fever work-up; if afebrile in the next 24 hours, can discharge to [facility]  -- t/b with Urology, RE: change out Westbrook given fevers on 6/17  -- low-threshold to restart abx with MRSA and Pseu coverage  -- monitor RUE PICC  -- c/w Ativan qAM as helping with her anxiety  -- will postpone discharge today for a total of 48hrs of observation and no fevers    Maya Christian  [phone]

## 2019-06-18 NOTE — PROGRESS NOTE ADULT - PROBLEM SELECTOR PLAN 2
trach to vent.   -plan for plasmapheresis week of July 1  -Currently without significant respiratory complaints    -No recommendation for scopolamine or Robinul due to neuro status and anticholinergic effect.

## 2019-06-18 NOTE — PROGRESS NOTE ADULT - PROBLEM SELECTOR PLAN 7
rectovaginal and enterovaginal fistulas developed secondary to problem #6 and staging surgery in Fall 2018  -  evaluated and discussed option of supra-pubic catheter that may decrease, but not eliminate her risk/propensity for recurrent urinary infections. Management in this respect has been deferred 2/2 her more acute problems as described above.  -re-demonstration of fistula on CT of abdomen . Feeding on hold. Presently on TPN, plan per gyn onc

## 2019-06-18 NOTE — PROGRESS NOTE ADULT - PROBLEM SELECTOR PLAN 1
New fever yesterday  without source.  Seems to have resolved and has negative urine and blood cultures so far    If remains afebrile could consider transfer to [facility] tomorrow  Concern remains given patients multiple risk factors for infection

## 2019-06-18 NOTE — PROGRESS NOTE ADULT - I WAS PHYSICALLY PRESENT FOR THE KEY PORTIONS OF THE EVALUATION AND MANAGEMENT (E/M) SERVICE PROVIDED.  I AGREE WITH THE ABOVE HISTORY, PHYSICAL, AND PLAN WHICH I HAVE REVIEWED AND EDITED WHERE APPROPRIATE

## 2019-06-18 NOTE — PROGRESS NOTE ADULT - PROBLEM SELECTOR PLAN 6
stage IV endometrial adenocarcinoma; per GYN-ONC w/ interval increase in tumor burden  - management per GYN-ONC  - not currently on chemotherapy secondary to performance status and concern for  infection  - patient had demonstrated poor understanding of current malignancy status earlier in admission and we had discussed this with her. Although patient has persistent and enlarging tumor- tumor burden is not great, oncology note appreciated (possible treatment upon discharge or when functionally improved).

## 2019-06-18 NOTE — PROGRESS NOTE ADULT - SUBJECTIVE AND OBJECTIVE BOX
OVERNIGHT EVENTS:    SUBJECTIVE:    Vital Signs Last 12 Hrs  T(F): 98.4 (19 @ 09:14), Max: 99.2 (19 @ 09:05)  HR: 88 (19 @ 09:05) (85 - 88)  BP: 140/78 (19 @ 09:05) (119/76 - 140/78)  BP(mean): --  RR: 14 (19 @ 09:05) (11 - 19)  SpO2: 100% (19 @ 09:05) (100% - 100%)  I&O's Summary    2019 07:  -  2019 07:00  --------------------------------------------------------  IN: 0 mL / OUT: 1000 mL / NET: -1000 mL    2019 07:  -  2019 10:56  --------------------------------------------------------  IN: 0 mL / OUT: 700 mL / NET: -700 mL        PHYSICAL EXAM:  Constitutional: NAD, comfortable in bed.  HEENT: NC/AT, PERRLA, EOMI, no conjunctival pallor or scleral icterus, MMM  Neck: Supple, no JVD  Respiratory: Normal rate, rhythm, depth, effort. CTAB. No w/r/r.   Cardiovascular: RRR, normal S1 and S2, no m/r/g.   Gastrointestinal: +BS, soft NTND, no guarding or rebound tenderness, no palpable masses   Extremities: wwp; no cyanosis, clubbing or edema.   Vascular: Pulses equal and strong throughout.   Neurological: AAOx3, no CN deficits, strength and sensation intact throughout.   Skin: No gross skin abnormalities or rashes        LABS:                        7.5    15.63 )-----------( 314      ( 2019 14:56 )             24.1     -    135  |  99  |  36<H>  ----------------------------<  154<H>  4.1   |  24  |  0.67    Ca    10.0      2019 14:57    TPro  7.3  /  Alb  3.7  /  TBili  0.3  /  DBili  x   /  AST  21  /  ALT  14  /  AlkPhos  111  06-17      Urinalysis Basic - ( 2019 14:59 )    Color: Yellow / Appearance: Clear / S.020 / pH: x  Gluc: x / Ketone: NEGATIVE  / Bili: Negative / Urobili: 0.2 E.U./dL   Blood: x / Protein: 100 mg/dL / Nitrite: NEGATIVE   Leuk Esterase: Large / RBC: > 10 /HPF / WBC Many /HPF   Sq Epi: x / Non Sq Epi: 0-5 /HPF / Bacteria: Present /HPF        RADIOLOGY & ADDITIONAL TESTS:    MEDICATIONS  (STANDING):  albumin human  5% IVPB 2750 milliLiter(s) IV Intermittent once  albumin human  5% IVPB 2750 milliLiter(s) IV Intermittent once  ALBUTerol/ipratropium for Nebulization 3 milliLiter(s) Nebulizer every 6 hours  calcium gluconate IVPB 1 Gram(s) IV Intermittent once  calcium gluconate IVPB 1 Gram(s) IV Intermittent once  chlorhexidine 0.12% Liquid 15 milliLiter(s) Oral Mucosa every 12 hours  chlorhexidine 2% Cloths 1 Application(s) Topical <User Schedule>  dextrose 5%. 1000 milliLiter(s) (50 mL/Hr) IV Continuous <Continuous>  dextrose 50% Injectable 25 Gram(s) IV Push once  enoxaparin Injectable 40 milliGRAM(s) SubCutaneous every 24 hours  fludroCORTISONE 0.1 milliGRAM(s) Oral every 24 hours  heparin 1000 units/mL 2000 Unit(s) 2000 Unit(s) IV Push once  heparin 1000units/mL 4000 Unit(s) 4000 Unit(s) IV Push once  insulin regular  human corrective regimen sliding scale   SubCutaneous every 6 hours  midodrine 5 milliGRAM(s) Oral every 8 hours  morphine  - Injectable 2 milliGRAM(s) IV Push once  nystatin Cream 1 Application(s) Topical two times a day  Parenteral Nutrition - Adult 1 Each (63 mL/Hr) TPN Continuous <Continuous>  sodium chloride 3%  Inhalation 3 milliLiter(s) Inhalation every 4 hours    MEDICATIONS  (PRN):  acetaminophen    Suspension .. 1000 milliGRAM(s) Oral every 8 hours PRN Moderate Pain (4 - 6)  dextrose 40% Gel 15 Gram(s) Oral once PRN Blood Glucose LESS THAN 70 milliGRAM(s)/deciliter  glucagon  Injectable 1 milliGRAM(s) IntraMuscular once PRN Glucose LESS THAN 70 milligrams/deciliter  lidocaine 2% Gel 1 Application(s) Topical two times a day PRN pain around PEG site  ondansetron Injectable 4 milliGRAM(s) IV Push every 6 hours PRN Nausea and/or Vomiting  sodium chloride 0.9% lock flush 10 milliLiter(s) IV Push every 1 hour PRN Pre/post blood products, medications, blood draw, and to maintain line patency OVERNIGHT EVENTS: KESHAWN. afebrile.    SUBJECTIVE: Patient feels stronger this morning than yesterday. Reports continued cough but denies CP, SOB, abdominal pain, denies stool from vagina.    Vital Signs Last 12 Hrs  T(F): 98.4 (19 @ 09:14), Max: 99.2 (19 @ 09:05)  HR: 88 (19 @ 09:05) (85 - 88)  BP: 140/78 (19 @ 09:05) (119/76 - 140/78)  BP(mean): --  RR: 14 (19 @ 09:05) (11 - 19)  SpO2: 100% (19 @ 09:05) (100% - 100%)  I&O's Summary    2019 07:  -  2019 07:00  --------------------------------------------------------  IN: 0 mL / OUT: 1000 mL / NET: -1000 mL    2019 07:  -  2019 10:56  --------------------------------------------------------  IN: 0 mL / OUT: 700 mL / NET: -700 mL        PHYSICAL EXAM:  Constitutional: NAD, comfortable in bed on vent w occasional coughing  HEENT: NC/AT, PERRLA, EOMI, no conjunctival pallor or scleral icterus, MMM, tracheostomy in place, no signs of infection  Neck: Supple, no tenderness  Respiratory: on ACVC, Normal rate, rhythm, depth, effort. rhonchorus R>L however likely contribution of upper airway sounds from trach/vent.   Cardiovascular: tachycardic, regular rhythm, normal S1 and S2, no m/r/g.   Gastrointestinal: +BS, soft NTND, no guarding or rebound tenderness, large midline scar, L colostomy bag w gas, PEG in place without signs of infection or tenderness. no suprapubic tenderness or CVA tenderness  : joseph in place  Extremities: wwp; no cyanosis, clubbing or edema.   Vascular: Pulses equal and strong throughout. R arm PICC line, L subclavian HD cath, and R chest wall chemoport all without erythema or tenderness to palpation  Neurological: AAOx3, no CN deficits, strength 4/5 in UEs, 1/5 in LEs, handwriting improved from yesterday  Skin: No gross skin abnormalities or rashes.        LABS:                        7.5    15.63 )-----------( 314      ( 2019 14:56 )             24.1         135  |  99  |  36<H>  ----------------------------<  154<H>  4.1   |  24  |  0.67    Ca    10.0      2019 14:57    TPro  7.3  /  Alb  3.7  /  TBili  0.3  /  DBili  x   /  AST  21  /  ALT  14  /  AlkPhos  111        Urinalysis Basic - ( 2019 14:59 )    Color: Yellow / Appearance: Clear / S.020 / pH: x  Gluc: x / Ketone: NEGATIVE  / Bili: Negative / Urobili: 0.2 E.U./dL   Blood: x / Protein: 100 mg/dL / Nitrite: NEGATIVE   Leuk Esterase: Large / RBC: > 10 /HPF / WBC Many /HPF   Sq Epi: x / Non Sq Epi: 0-5 /HPF / Bacteria: Present /HPF        RADIOLOGY & ADDITIONAL TESTS:    MEDICATIONS  (STANDING):  albumin human  5% IVPB 2750 milliLiter(s) IV Intermittent once  albumin human  5% IVPB 2750 milliLiter(s) IV Intermittent once  ALBUTerol/ipratropium for Nebulization 3 milliLiter(s) Nebulizer every 6 hours  calcium gluconate IVPB 1 Gram(s) IV Intermittent once  calcium gluconate IVPB 1 Gram(s) IV Intermittent once  chlorhexidine 0.12% Liquid 15 milliLiter(s) Oral Mucosa every 12 hours  chlorhexidine 2% Cloths 1 Application(s) Topical <User Schedule>  dextrose 5%. 1000 milliLiter(s) (50 mL/Hr) IV Continuous <Continuous>  dextrose 50% Injectable 25 Gram(s) IV Push once  enoxaparin Injectable 40 milliGRAM(s) SubCutaneous every 24 hours  fludroCORTISONE 0.1 milliGRAM(s) Oral every 24 hours  heparin 1000 units/mL 2000 Unit(s) 2000 Unit(s) IV Push once  heparin 1000units/mL 4000 Unit(s) 4000 Unit(s) IV Push once  insulin regular  human corrective regimen sliding scale   SubCutaneous every 6 hours  midodrine 5 milliGRAM(s) Oral every 8 hours  morphine  - Injectable 2 milliGRAM(s) IV Push once  nystatin Cream 1 Application(s) Topical two times a day  Parenteral Nutrition - Adult 1 Each (63 mL/Hr) TPN Continuous <Continuous>  sodium chloride 3%  Inhalation 3 milliLiter(s) Inhalation every 4 hours    MEDICATIONS  (PRN):  acetaminophen    Suspension .. 1000 milliGRAM(s) Oral every 8 hours PRN Moderate Pain (4 - 6)  dextrose 40% Gel 15 Gram(s) Oral once PRN Blood Glucose LESS THAN 70 milliGRAM(s)/deciliter  glucagon  Injectable 1 milliGRAM(s) IntraMuscular once PRN Glucose LESS THAN 70 milligrams/deciliter  lidocaine 2% Gel 1 Application(s) Topical two times a day PRN pain around PEG site  ondansetron Injectable 4 milliGRAM(s) IV Push every 6 hours PRN Nausea and/or Vomiting  sodium chloride 0.9% lock flush 10 milliLiter(s) IV Push every 1 hour PRN Pre/post blood products, medications, blood draw, and to maintain line patency

## 2019-06-18 NOTE — PROGRESS NOTE ADULT - PROBLEM SELECTOR PLAN 8
Support provided to patient and family. Patient to have access to supportive services during rest of hospital stay as the patient/family deemed necessary ie. Chaplaincy, Massage therapy, Music therapy, Patient and family supportive services, Palliative SW, etc. as identified during the patients PSSA screening the patient would benefit from: continued visits from Patient and Family Support counselor and massage therapy.    Being transferred to [facility]- difficult change.  Reassured.  Sister will be traveling with her.  Scheduled to be back for plasmapheresis week of July 1, and our team will continue to follow    Prognosis very guarded

## 2019-06-18 NOTE — PROGRESS NOTE ADULT - SUBJECTIVE AND OBJECTIVE BOX
GYN Progress Note    Patient seen at bedside for PM rounds.  Reports feeling improved today, with greater strength.  Less difficulty breathing, feels improvement in secretions.  Pain is controlled.  Remains afebrile - no fever/chills.  She remains NPO on TPN, but does report feeling hungry.  No nausea/emesis.  Denies leakage of stool per vagina.      Vital Signs Last 24 Hrs  T(C): 36.7 (2019 16:29), Max: 37.3 (2019 09:05)  T(F): 98 (2019 16:29), Max: 99.2 (2019 09:05)  HR: 86 (2019 16:55) (82 - 88)  BP: 128/78 (2019 16:29) (119/76 - 146/77)  BP(mean): --  RR: 19 (2019 16:55) (11 - 19)  SpO2: 100% (2019 16:55) (96% - 100%)    Physical Exam:  Gen: No Acute Distress  Pulm: normal work of breathing, trach in place, bronchial breath sounds with some rhonchi  GI: soft, nontender.  Stable ecchymosis within previously drawn borders on right flank.  Ostomy with liquid output, pink and functioning well.    Ext: no edema, nontender calves bilaterally    I&O's Summary    2019 07:  -  2019 07:00  --------------------------------------------------------  IN: 0 mL / OUT: 1000 mL / NET: -1000 mL    2019 07:01  -  2019 19:16  --------------------------------------------------------  IN: 0 mL / OUT: 1450 mL / NET: -1450 mL      MEDICATIONS  (STANDING):  albumin human  5% IVPB 2750 milliLiter(s) IV Intermittent once  albumin human  5% IVPB 2750 milliLiter(s) IV Intermittent once  ALBUTerol/ipratropium for Nebulization 3 milliLiter(s) Nebulizer every 6 hours  calcium gluconate IVPB 1 Gram(s) IV Intermittent once  calcium gluconate IVPB 1 Gram(s) IV Intermittent once  chlorhexidine 0.12% Liquid 15 milliLiter(s) Oral Mucosa every 12 hours  chlorhexidine 2% Cloths 1 Application(s) Topical <User Schedule>  dextrose 5%. 1000 milliLiter(s) (50 mL/Hr) IV Continuous <Continuous>  dextrose 50% Injectable 25 Gram(s) IV Push once  enoxaparin Injectable 40 milliGRAM(s) SubCutaneous every 24 hours  fat emulsion (Plant Based) 20% Infusion 0.938 Gm/kG/Day (20.832 mL/Hr) IV Continuous <Continuous>  fludroCORTISONE 0.1 milliGRAM(s) Oral every 24 hours  heparin 1000 units/mL 2000 Unit(s) 2000 Unit(s) IV Push once  heparin 1000units/mL 4000 Unit(s) 4000 Unit(s) IV Push once  insulin regular  human corrective regimen sliding scale   SubCutaneous every 6 hours  midodrine 5 milliGRAM(s) Oral every 8 hours  morphine  - Injectable 2 milliGRAM(s) IV Push once  nystatin Cream 1 Application(s) Topical two times a day  Parenteral Nutrition - Adult 1 Each (63 mL/Hr) TPN Continuous <Continuous>  Parenteral Nutrition - Adult 1 Each (63 mL/Hr) TPN Continuous <Continuous>  sodium chloride 3%  Inhalation 3 milliLiter(s) Inhalation every 4 hours    MEDICATIONS  (PRN):  acetaminophen    Suspension .. 1000 milliGRAM(s) Oral every 8 hours PRN Moderate Pain (4 - 6)  dextrose 40% Gel 15 Gram(s) Oral once PRN Blood Glucose LESS THAN 70 milliGRAM(s)/deciliter  glucagon  Injectable 1 milliGRAM(s) IntraMuscular once PRN Glucose LESS THAN 70 milligrams/deciliter  lidocaine 2% Gel 1 Application(s) Topical two times a day PRN pain around PEG site  ondansetron Injectable 4 milliGRAM(s) IV Push every 6 hours PRN Nausea and/or Vomiting  sodium chloride 0.9% lock flush 10 milliLiter(s) IV Push every 1 hour PRN Pre/post blood products, medications, blood draw, and to maintain line patency      LABS:                        7.7    14.96 )-----------( 271      ( 2019 11:30 )             25.4     06-18    134<L>  |  98  |  37<H>  ----------------------------<  158<H>  4.5   |  23  |  0.61    Ca    10.2      2019 11:30  Phos  4.5     -18  Mg     2.0     18    TPro  7.7  /  Alb  3.6  /  TBili  0.3  /  DBili  x   /  AST  18  /  ALT  14  /  AlkPhos  129<H>  -18      Urinalysis Basic - ( 2019 14:59 )    Color: Yellow / Appearance: Clear / S.020 / pH: x  Gluc: x / Ketone: NEGATIVE  / Bili: Negative / Urobili: 0.2 E.U./dL   Blood: x / Protein: 100 mg/dL / Nitrite: NEGATIVE   Leuk Esterase: Large / RBC: > 10 /HPF / WBC Many /HPF   Sq Epi: x / Non Sq Epi: 0-5 /HPF / Bacteria: Present /HPF

## 2019-06-18 NOTE — PROGRESS NOTE ADULT - SUBJECTIVE AND OBJECTIVE BOX
TIM MCDANIEL             [id number]    CC:  weakness, GO    HPI:  58 yo G0 w/ known stage IV endometrial carcinoma s/p staging surgery 7/2018 and 1 cycle of chemotherapy in 2/2019 followed by multiple admissions for management of symptomatic enterovaginal fistula, complex fistula associated urinary tract infection, bacteremia presents on referral from [facility] after fever developing fever.  Found to have severe sepsis, required intubation and treatment of UTI,.  Initially improved but then declined neurologically and required intubation for hypercapnic respiratory failure.  Was extubated and did well for several days. Required reintubation for profound weakness and again, hypercapneic respiratory failure.  Treated with plasmapheresis with improvement, but still unable to be safely weaned.  patient chose to be trached and pegged because she felt she could get better.    SUBJECTIVE:  Patient seen at bedside, she is awake and alert. Yesterday patient was restless complaining of nausea and anxiety  Treated with anxiotlytics.  Had no other specific symtpoms with plan for discharge later in the afternoon.  Just before discharge she spiked temperature to 102.  Underwent full culture protocol, but so far, no recurrence of fever.  No rigors, generally feels better, denies nausea.  Labs indicated elevated WBC, but urine culture and blood cultures so far negative.  Chest x-ray essentially unchanged. . Tolerating TPN which was restarted because of recurrent enterovaginal fistula.  Has not had trouble with mucous plugging.  Hope for discharge tomorrow if no further events.      ROS:  DYSPNEA: No  NAUS/VOM: No  SECRETIONS: at baseline  AGITATION: No, denies anxiety  Pain (Y/N): No  -Provocation/Palliation:  -Quality/Quantity:  -Radiating:  -Severity:  -Timing/Frequency:  -Impact on ADLs:    Remainder of ROS unremarkable      PEx:  Vital Signs Last 24 Hrs  T(C): 36.7 (18 Jun 2019 16:29), Max: 37.3 (18 Jun 2019 09:05)  T(F): 98 (18 Jun 2019 16:29), Max: 99.2 (18 Jun 2019 09:05)  HR: 86 (18 Jun 2019 16:55) (82 - 88)  BP: 128/78 (18 Jun 2019 16:29) (119/76 - 146/77)  BP(mean): --  RR: 19 (18 Jun 2019 16:55) (11 - 19)  SpO2: 100% (18 Jun 2019 16:55) (97% - 100%)    General: frail female, chronically ill appearing again comfortable in bed ; appearing older than stated age, pale,   Denies specific symptoms. Writing notes to me without difficulty  HEENT: moderate alopecia; conjunctival pallor;  smiles often.  Neck: supple, but appears stronger with more head control.  Able to shake head vigorously "yes" and "no" trach in place  mouthing words with some vocalization around the trach  CVS: S1/S2, RRR  Resp: on ventilator  GI: soft, mild tenderness over LLQ, no rebound,  PEG  Musc: hypotrophic muscularity and bulk, particularly of UE/hands, LE; profound kyphoscoliosis muscle wasting of left trapezius and latissimus dorsi  Neuro: awake and alert; shrugs shoulders moderate neck strength, squeezes my hand, is able to move all extremities, can move UEs more than LEs, not able to life LEs  Psych: aware of care management. states she was on TPN before, and knows why she should be on it again.   Skin: intact	     ALLERGIES: IVIG PRODUCT IS PRIGIVEN OR GAMMUNEX (Unknown)  No Known Allergies    OPIATE NAÏVE (Y/N): Yes on presentation, had received opiates earlier in admission    MEDICATIONS  (STANDING):  albumin human  5% IVPB 2750 milliLiter(s) IV Intermittent once  albumin human  5% IVPB 2750 milliLiter(s) IV Intermittent once  ALBUTerol/ipratropium for Nebulization 3 milliLiter(s) Nebulizer every 6 hours  calcium gluconate IVPB 1 Gram(s) IV Intermittent once  calcium gluconate IVPB 1 Gram(s) IV Intermittent once  chlorhexidine 0.12% Liquid 15 milliLiter(s) Oral Mucosa every 12 hours  chlorhexidine 2% Cloths 1 Application(s) Topical <User Schedule>  dextrose 5%. 1000 milliLiter(s) (50 mL/Hr) IV Continuous <Continuous>  dextrose 50% Injectable 25 Gram(s) IV Push once  enoxaparin Injectable 40 milliGRAM(s) SubCutaneous every 24 hours  fat emulsion (Plant Based) 20% Infusion 0.938 Gm/kG/Day (20.832 mL/Hr) IV Continuous <Continuous>  fludroCORTISONE 0.1 milliGRAM(s) Oral every 24 hours  heparin 1000 units/mL 2000 Unit(s) 2000 Unit(s) IV Push once  heparin 1000units/mL 4000 Unit(s) 4000 Unit(s) IV Push once  insulin regular  human corrective regimen sliding scale   SubCutaneous every 6 hours  midodrine 5 milliGRAM(s) Oral every 8 hours  morphine  - Injectable 2 milliGRAM(s) IV Push once  nystatin Cream 1 Application(s) Topical two times a day  Parenteral Nutrition - Adult 1 Each (63 mL/Hr) TPN Continuous <Continuous>  Parenteral Nutrition - Adult 1 Each (63 mL/Hr) TPN Continuous <Continuous>  sodium chloride 3%  Inhalation 3 milliLiter(s) Inhalation every 4 hours    MEDICATIONS  (PRN):  acetaminophen    Suspension .. 1000 milliGRAM(s) Oral every 8 hours PRN Moderate Pain (4 - 6)  dextrose 40% Gel 15 Gram(s) Oral once PRN Blood Glucose LESS THAN 70 milliGRAM(s)/deciliter  glucagon  Injectable 1 milliGRAM(s) IntraMuscular once PRN Glucose LESS THAN 70 milligrams/deciliter  lidocaine 2% Gel 1 Application(s) Topical two times a day PRN pain around PEG site  ondansetron Injectable 4 milliGRAM(s) IV Push every 6 hours PRN Nausea and/or Vomiting  sodium chloride 0.9% lock flush 10 milliLiter(s) IV Push every 1 hour PRN Pre/post blood products, medications, blood draw, and to maintain line patency                              LABS:                                                          7.7    14.96 )-----------( 271      ( 18 Jun 2019 11:30 )             25.4   06-18    134<L>  |  98  |  37<H>  ----------------------------<  158<H>  4.5   |  23  |  0.61    Ca    10.2      18 Jun 2019 11:30  Phos  4.5     06-18  Mg     2.0     06-18    TPro  7.7  /  Alb  3.6  /  TBili  0.3  /  DBili  x   /  AST  18  /  ALT  14  /  AlkPhos  129<H>  06-18                         IMAGING: REVIEWED    < from: Xray Chest 1 View- PORTABLE-Urgent (06.17.19 @ 15:33) >  EXAM:  XR CHEST PORTABLE URGENT 1V                          PROCEDURE DATE:  06/17/2019          INTERPRETATION:  PORTABLE CHEST X-RAY     HISTORY: fever    PRIOR STUDIES: 6/9 / 2019    FINDINGS: Size of cardiac silhouette unchanged. No cardiomegaly.   Tracheostomy tube in situ. Left internal jugular venous HD catheter with   tip overlying the SVC. Right internal jugular venous [id number] with   tip overlying the SVC. Right-sided PICC line with tip overlying the right   atrium. No pneumothorax or pleural effusion. Lungs grossly unchanged.    IMPRESSION:  No gross interval change.    < end of copied text >    CT Abd/Plv- CTA chest (June 5th):   IMPRESSION:    No pulmonary embolus.    Mild left hydroureteronephrosis due to distal obstruction from either 2.5   cm left pelvic sidewall implant and/or 6.4mm stone at the left   UVJ/bladder.    Enterovaginal fistula.    Pelvic lymphadenopathy mildly increased from prior exam.     Small amount of free fluid around the liver and spleen.   < from: Xray Chest 1 View- PORTABLE-Routine (05.24.19 @ 04:45) >  EXAM:  XR CHEST PORTABLE ROUTINE 1V                          PROCEDURE DATE:  05/24/2019      INTERPRETATION:    CXR 6/5/2019: 6/5/2019. Tracheostomy tube and bilateral central venous lines are   unchanged. Stable heart size. There is again increased markings in the   left lung which could be due to patient's rotation. Right lung is clear.   Cannot exclude a small left pleural fusion. No right pleural effusion. No   pneumothorax.    ADVANCED DIRECTIVES:     see note above, DNR, trach, PEG    DECISION MAKER: patient/self  LEGAL SURROGATE: Esha Avila (sister) [phone]    PSYCHOSOCIAL-SPIRITUAL ASSESSMENT:       Reviewed       Care plan as below	    GOALS OF CARE DISCUSSION       Palliative care info/counseling provided previously.  Awaiting arrival of Esha roblero	           Advanced Directives addressed please see Advance Care Planning Note	           See previous Palliative Medicine Notes       Documentation of GOC: Agrees with efforts to treat,     on trach and TPN for nutrition.  Remains hopeful in a difficult situation.  Wants to continue plasmapheresis   	      AGENCY CHOICE DISCUSSED:     [facility]- unable to find accepting LTAC    REFERRALS	        Palliative Med        Unit SW/Case Mgmt        - declined       Speech/Swallow - failed dysphagia; PEG in place       Patient/Family Support       Massage Therapy       Music Therapy       Hospice - not applicable at present, still hopeful to be treated       Nutrition       PT/OT

## 2019-06-18 NOTE — PROGRESS NOTE ADULT - PROBLEM SELECTOR PLAN 3
responded to plasmapheresis with increased strength in upper body  - prognosis overall remains guarded given her multiple chronic comorbidities, the prospect of cytotoxic chemotherapy initiation for her malignancy per GYN, and now on ventilation and Peg-tube.  Peg-tube feeding was stopped due to detection of enterovaginal fistula on CT scan . Started back on TPN last week. Wants to continue with treatment

## 2019-06-18 NOTE — PROGRESS NOTE ADULT - PROBLEM SELECTOR PLAN 7
Hx of urinary retention/overflow incontinence   - Joseph changed on 5/17   - ct abdomen shows mild L hydro w obstructing stone on 6/6  - per urology, continue w joseph  - kidney and bladder US with 0.7cm stone in L kidney but no hydronephrosis    #PEG placement  - Pt s/p PEG placement 5/21   - flushes well per nursing. GI loosened bumper. pain improved     #rectovaginal fistula  - per GYN onc, hx of recto vaginal fistula that resolved spontaneously, however has recurred. currently no leakage of stool but continuing w tpn for now  - confirmed entero vaginal fistula on CT pelvis  - appreciate gyn recs  -NPO  -TPN for now.

## 2019-06-18 NOTE — PROGRESS NOTE ADULT - ASSESSMENT
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  Hospital course notable for prolonged intubation and MICU admission, transitioned to tracheostomy and PEG tube on 5/21, now weened off pressors and transferred to chronic vent unit on 4Uris. Enterovaginal fistula now stable- no stool output from vagina today. Continue to treat conservatively by bowel rest and TPN. Pt had a fever of 102 F at 1350 today and has been tachycardic. Fever workup.   Primary management per Medicine.     -ID: Febrile yesterday to 102 however without clear source, holding antibiotics.  White count stable at 14 from 15, cultures negative to date.  48 hours of monitoring prior to possible DC to [facility]  -Fistula: manage conservatively w/bowel rest. Continue TPN and holding tube feeds. Continue to monitor output per vagina.   -: Neurogenic bladder w/indwelling joseph in place. Follow up urine culture   -GYN malignancy  s/p Anastrazole, s/p Megace 80mg BID x3 wks , s/p Tamoxifen 5/1-5/17 (stopped due to upper extremity DVT). pathology serous on review of pathology, when stable for genetic counselor, Once patient recovers from acute neurologic issue will reassess cancer treatment options, - ID: suspected tracheitis - now s/p zosyn and vancomycin. Currently tracking down initial pathology report from [facility] - pending in [facility] path lab  Will avoid immunotherapy at this time given associated risk of autoimmune disease   - Social work/palliative:  DNR/DNI. Interdisciplinary meeting was done on 5/23

## 2019-06-18 NOTE — PROGRESS NOTE ADULT - ASSESSMENT
60 yo F PMHx polio, breast cancer, DM, Endometrial Cancer s/p exploratory laparotomy, enterolysis, SHAMIR, BSO, pelvic lymphadenectomy, low anterior resection mobilization of splenic flexure, end colostomy on 7/30/18, rectovaginal fistula, numerous admissions for urinary tract infection presented to [facility] in the setting of urosepsis. Hospital course complicated by ESBL E coli bacteremia (completed ertapenem on 5/14 ) and respiratory failure requiring multiple intubations 2/2 AIDP, now s/p trach and PEG (5/21), s/p treatment for sepsis 2/2 MRSA PNA w/ linezolid. Now on vanco/zosyn for tracheitis.  Now s/p 2nd round of plasmapheresis. Now with re-opening of enteric-vaginal fistula, on strict NPO requiring TPN. New sepsis on 6/17 unclear source.

## 2019-06-18 NOTE — PROGRESS NOTE ADULT - PROBLEM SELECTOR PLAN 1
patient meeting 3/4 SIRS on 6/17 w fever, tachycardia, and leukocytosis. lactate neg. Has not spiked another fever since then. Unclear etiology. Possible sources include VAP, line infection (PICC, HD cath, chemoport), UTI (has joseph). Viral less likely given negative RVP, GI source less likely given benign abdominal exam  - RVP negative  - CXR 6/17 without new consolidation. however is rhonchorus and has a cough  - UA positive however has chronic joseph. No sp or CVA tenderness on exam. Urine culture with NGTD  - lines are concerning however no tenderness on exam  - will continue hold off on abx at this time. If spikes fever again, then will start vanc, zosyn, and aminoglycoside to cover for VAP, especially given hx of MRSA pna and ESBL ecoli bacteremia (sensitive to ertapenem earlier on in admission)  - f/u blood cx - NGTD at 12hrs  - f/u sputum cx - NGTD  - if blood cultures positive, then will need HD cath and PICC removed stat

## 2019-06-19 VITALS — HEART RATE: 82 BPM | OXYGEN SATURATION: 100 %

## 2019-06-19 LAB
ANION GAP SERPL CALC-SCNC: 11 MMOL/L — SIGNIFICANT CHANGE UP (ref 5–17)
BASOPHILS # BLD AUTO: 0.02 K/UL — SIGNIFICANT CHANGE UP (ref 0–0.2)
BASOPHILS NFR BLD AUTO: 0.2 % — SIGNIFICANT CHANGE UP (ref 0–2)
BUN SERPL-MCNC: 40 MG/DL — HIGH (ref 7–23)
CALCIUM SERPL-MCNC: 10.2 MG/DL — SIGNIFICANT CHANGE UP (ref 8.4–10.5)
CHLORIDE SERPL-SCNC: 97 MMOL/L — SIGNIFICANT CHANGE UP (ref 96–108)
CO2 SERPL-SCNC: 25 MMOL/L — SIGNIFICANT CHANGE UP (ref 22–31)
CREAT SERPL-MCNC: 0.61 MG/DL — SIGNIFICANT CHANGE UP (ref 0.5–1.3)
CULTURE RESULTS: SIGNIFICANT CHANGE UP
EOSINOPHIL # BLD AUTO: 0.25 K/UL — SIGNIFICANT CHANGE UP (ref 0–0.5)
EOSINOPHIL NFR BLD AUTO: 2.2 % — SIGNIFICANT CHANGE UP (ref 0–6)
GLUCOSE SERPL-MCNC: 182 MG/DL — HIGH (ref 70–99)
GRAM STN FLD: SIGNIFICANT CHANGE UP
HCT VFR BLD CALC: 23.6 % — LOW (ref 34.5–45)
HGB BLD-MCNC: 7.4 G/DL — LOW (ref 11.5–15.5)
IMM GRANULOCYTES NFR BLD AUTO: 0.5 % — SIGNIFICANT CHANGE UP (ref 0–1.5)
LYMPHOCYTES # BLD AUTO: 1.74 K/UL — SIGNIFICANT CHANGE UP (ref 1–3.3)
LYMPHOCYTES # BLD AUTO: 15.5 % — SIGNIFICANT CHANGE UP (ref 13–44)
MAGNESIUM SERPL-MCNC: 1.9 MG/DL — SIGNIFICANT CHANGE UP (ref 1.6–2.6)
MCHC RBC-ENTMCNC: 28.5 PG — SIGNIFICANT CHANGE UP (ref 27–34)
MCHC RBC-ENTMCNC: 31.4 GM/DL — LOW (ref 32–36)
MCV RBC AUTO: 90.8 FL — SIGNIFICANT CHANGE UP (ref 80–100)
MONOCYTES # BLD AUTO: 0.85 K/UL — SIGNIFICANT CHANGE UP (ref 0–0.9)
MONOCYTES NFR BLD AUTO: 7.6 % — SIGNIFICANT CHANGE UP (ref 2–14)
NEUTROPHILS # BLD AUTO: 8.3 K/UL — HIGH (ref 1.8–7.4)
NEUTROPHILS NFR BLD AUTO: 74 % — SIGNIFICANT CHANGE UP (ref 43–77)
NRBC # BLD: 0 /100 WBCS — SIGNIFICANT CHANGE UP (ref 0–0)
PHOSPHATE SERPL-MCNC: 4.4 MG/DL — SIGNIFICANT CHANGE UP (ref 2.5–4.5)
PLATELET # BLD AUTO: 241 K/UL — SIGNIFICANT CHANGE UP (ref 150–400)
POTASSIUM SERPL-MCNC: 4.1 MMOL/L — SIGNIFICANT CHANGE UP (ref 3.5–5.3)
POTASSIUM SERPL-SCNC: 4.1 MMOL/L — SIGNIFICANT CHANGE UP (ref 3.5–5.3)
RBC # BLD: 2.6 M/UL — LOW (ref 3.8–5.2)
RBC # FLD: 15.9 % — HIGH (ref 10.3–14.5)
SODIUM SERPL-SCNC: 133 MMOL/L — LOW (ref 135–145)
SPECIMEN SOURCE: SIGNIFICANT CHANGE UP
SPECIMEN SOURCE: SIGNIFICANT CHANGE UP
WBC # BLD: 11.22 K/UL — HIGH (ref 3.8–10.5)
WBC # FLD AUTO: 11.22 K/UL — HIGH (ref 3.8–10.5)

## 2019-06-19 PROCEDURE — 94640 AIRWAY INHALATION TREATMENT: CPT

## 2019-06-19 PROCEDURE — 83735 ASSAY OF MAGNESIUM: CPT

## 2019-06-19 PROCEDURE — 86042 ACETYLCHOLN RCPTR BLCKG ANTB: CPT

## 2019-06-19 PROCEDURE — 83615 LACTATE (LD) (LDH) ENZYME: CPT

## 2019-06-19 PROCEDURE — 84157 ASSAY OF PROTEIN OTHER: CPT

## 2019-06-19 PROCEDURE — 97110 THERAPEUTIC EXERCISES: CPT

## 2019-06-19 PROCEDURE — 94003 VENT MGMT INPAT SUBQ DAY: CPT

## 2019-06-19 PROCEDURE — 82533 TOTAL CORTISOL: CPT

## 2019-06-19 PROCEDURE — 85384 FIBRINOGEN ACTIVITY: CPT

## 2019-06-19 PROCEDURE — 83916 OLIGOCLONAL BANDS: CPT

## 2019-06-19 PROCEDURE — 80202 ASSAY OF VANCOMYCIN: CPT

## 2019-06-19 PROCEDURE — 87015 SPECIMEN INFECT AGNT CONCNTJ: CPT

## 2019-06-19 PROCEDURE — 83519 RIA NONANTIBODY: CPT

## 2019-06-19 PROCEDURE — 94002 VENT MGMT INPAT INIT DAY: CPT

## 2019-06-19 PROCEDURE — 86923 COMPATIBILITY TEST ELECTRIC: CPT

## 2019-06-19 PROCEDURE — 97164 PT RE-EVAL EST PLAN CARE: CPT

## 2019-06-19 PROCEDURE — 86041 ACETYLCHOLN RCPTR BNDNG ANTB: CPT

## 2019-06-19 PROCEDURE — 36415 COLL VENOUS BLD VENIPUNCTURE: CPT

## 2019-06-19 PROCEDURE — 82803 BLOOD GASES ANY COMBINATION: CPT

## 2019-06-19 PROCEDURE — 43753 TX GASTRO INTUB W/ASP: CPT

## 2019-06-19 PROCEDURE — 86255 FLUORESCENT ANTIBODY SCREEN: CPT

## 2019-06-19 PROCEDURE — 88321 CONSLTJ&REPRT SLD PREP ELSWR: CPT

## 2019-06-19 PROCEDURE — 83880 ASSAY OF NATRIURETIC PEPTIDE: CPT

## 2019-06-19 PROCEDURE — 36430 TRANSFUSION BLD/BLD COMPNT: CPT

## 2019-06-19 PROCEDURE — 80076 HEPATIC FUNCTION PANEL: CPT

## 2019-06-19 PROCEDURE — 82330 ASSAY OF CALCIUM: CPT

## 2019-06-19 PROCEDURE — 89051 BODY FLUID CELL COUNT: CPT

## 2019-06-19 PROCEDURE — 87252 VIRUS INOCULATION TISSUE: CPT

## 2019-06-19 PROCEDURE — 99233 SBSQ HOSP IP/OBS HIGH 50: CPT

## 2019-06-19 PROCEDURE — 97535 SELF CARE MNGMENT TRAINING: CPT

## 2019-06-19 PROCEDURE — 74177 CT ABD & PELVIS W/CONTRAST: CPT

## 2019-06-19 PROCEDURE — 87116 MYCOBACTERIA CULTURE: CPT

## 2019-06-19 PROCEDURE — 93971 EXTREMITY STUDY: CPT

## 2019-06-19 PROCEDURE — 83690 ASSAY OF LIPASE: CPT

## 2019-06-19 PROCEDURE — P9016: CPT

## 2019-06-19 PROCEDURE — 96375 TX/PRO/DX INJ NEW DRUG ADDON: CPT | Mod: XU

## 2019-06-19 PROCEDURE — 81001 URINALYSIS AUTO W/SCOPE: CPT

## 2019-06-19 PROCEDURE — 85025 COMPLETE CBC W/AUTO DIFF WBC: CPT

## 2019-06-19 PROCEDURE — 70553 MRI BRAIN STEM W/O & W/DYE: CPT

## 2019-06-19 PROCEDURE — P9045: CPT

## 2019-06-19 PROCEDURE — 86043 ACETYLCHOLN RCPTR MODLG ANTB: CPT

## 2019-06-19 PROCEDURE — 87581 M.PNEUMON DNA AMP PROBE: CPT

## 2019-06-19 PROCEDURE — 94660 CPAP INITIATION&MGMT: CPT

## 2019-06-19 PROCEDURE — 82962 GLUCOSE BLOOD TEST: CPT

## 2019-06-19 PROCEDURE — 87529 HSV DNA AMP PROBE: CPT

## 2019-06-19 PROCEDURE — 87106 FUNGI IDENTIFICATION YEAST: CPT

## 2019-06-19 PROCEDURE — 86850 RBC ANTIBODY SCREEN: CPT

## 2019-06-19 PROCEDURE — 36558 INSERT TUNNELED CV CATH: CPT

## 2019-06-19 PROCEDURE — 86341 ISLET CELL ANTIBODY: CPT

## 2019-06-19 PROCEDURE — 82945 GLUCOSE OTHER FLUID: CPT

## 2019-06-19 PROCEDURE — 31500 INSERT EMERGENCY AIRWAY: CPT

## 2019-06-19 PROCEDURE — 84132 ASSAY OF SERUM POTASSIUM: CPT

## 2019-06-19 PROCEDURE — 87799 DETECT AGENT NOS DNA QUANT: CPT

## 2019-06-19 PROCEDURE — 72156 MRI NECK SPINE W/O & W/DYE: CPT

## 2019-06-19 PROCEDURE — 88108 CYTOPATH CONCENTRATE TECH: CPT

## 2019-06-19 PROCEDURE — 99152 MOD SED SAME PHYS/QHP 5/>YRS: CPT

## 2019-06-19 PROCEDURE — 84295 ASSAY OF SERUM SODIUM: CPT

## 2019-06-19 PROCEDURE — 87486 CHLMYD PNEUM DNA AMP PROBE: CPT

## 2019-06-19 PROCEDURE — 84478 ASSAY OF TRIGLYCERIDES: CPT

## 2019-06-19 PROCEDURE — 86366 MUSCLE-SPECIFIC KINASE ANTB: CPT

## 2019-06-19 PROCEDURE — 83010 ASSAY OF HAPTOGLOBIN QUANT: CPT

## 2019-06-19 PROCEDURE — 97112 NEUROMUSCULAR REEDUCATION: CPT

## 2019-06-19 PROCEDURE — 87205 SMEAR GRAM STAIN: CPT

## 2019-06-19 PROCEDURE — 76937 US GUIDE VASCULAR ACCESS: CPT

## 2019-06-19 PROCEDURE — A9585: CPT

## 2019-06-19 PROCEDURE — 71045 X-RAY EXAM CHEST 1 VIEW: CPT

## 2019-06-19 PROCEDURE — 84100 ASSAY OF PHOSPHORUS: CPT

## 2019-06-19 PROCEDURE — 87798 DETECT AGENT NOS DNA AMP: CPT

## 2019-06-19 PROCEDURE — 85045 AUTOMATED RETICULOCYTE COUNT: CPT

## 2019-06-19 PROCEDURE — C1750: CPT

## 2019-06-19 PROCEDURE — 93005 ELECTROCARDIOGRAM TRACING: CPT

## 2019-06-19 PROCEDURE — 85027 COMPLETE CBC AUTOMATED: CPT

## 2019-06-19 PROCEDURE — 36514 APHERESIS PLASMA: CPT

## 2019-06-19 PROCEDURE — 76770 US EXAM ABDO BACK WALL COMP: CPT

## 2019-06-19 PROCEDURE — 70450 CT HEAD/BRAIN W/O DYE: CPT

## 2019-06-19 PROCEDURE — 87483 CNS DNA AMP PROBE TYPE 12-25: CPT

## 2019-06-19 PROCEDURE — 85730 THROMBOPLASTIN TIME PARTIAL: CPT

## 2019-06-19 PROCEDURE — 74018 RADEX ABDOMEN 1 VIEW: CPT

## 2019-06-19 PROCEDURE — 83516 IMMUNOASSAY NONANTIBODY: CPT

## 2019-06-19 PROCEDURE — 87070 CULTURE OTHR SPECIMN AEROBIC: CPT

## 2019-06-19 PROCEDURE — 87040 BLOOD CULTURE FOR BACTERIA: CPT

## 2019-06-19 PROCEDURE — 88341 IMHCHEM/IMCYTCHM EA ADD ANTB: CPT

## 2019-06-19 PROCEDURE — 84300 ASSAY OF URINE SODIUM: CPT

## 2019-06-19 PROCEDURE — 86901 BLOOD TYPING SEROLOGIC RH(D): CPT

## 2019-06-19 PROCEDURE — 80048 BASIC METABOLIC PNL TOTAL CA: CPT

## 2019-06-19 PROCEDURE — 71260 CT THORAX DX C+: CPT

## 2019-06-19 PROCEDURE — 80053 COMPREHEN METABOLIC PANEL: CPT

## 2019-06-19 PROCEDURE — 87086 URINE CULTURE/COLONY COUNT: CPT

## 2019-06-19 PROCEDURE — C1769: CPT

## 2019-06-19 PROCEDURE — 97530 THERAPEUTIC ACTIVITIES: CPT

## 2019-06-19 PROCEDURE — 84484 ASSAY OF TROPONIN QUANT: CPT

## 2019-06-19 PROCEDURE — 77001 FLUOROGUIDE FOR VEIN DEVICE: CPT

## 2019-06-19 PROCEDURE — 36573 INSJ PICC RS&I 5 YR+: CPT

## 2019-06-19 PROCEDURE — 86900 BLOOD TYPING SEROLOGIC ABO: CPT

## 2019-06-19 PROCEDURE — 87186 SC STD MICRODIL/AGAR DIL: CPT

## 2019-06-19 PROCEDURE — 87631 RESP VIRUS 3-5 TARGETS: CPT

## 2019-06-19 PROCEDURE — 87181 SC STD AGAR DILUTION PER AGT: CPT

## 2019-06-19 PROCEDURE — 86592 SYPHILIS TEST NON-TREP QUAL: CPT

## 2019-06-19 PROCEDURE — 93970 EXTREMITY STUDY: CPT

## 2019-06-19 PROCEDURE — 87633 RESP VIRUS 12-25 TARGETS: CPT

## 2019-06-19 PROCEDURE — 99285 EMERGENCY DEPT VISIT HI MDM: CPT | Mod: 25

## 2019-06-19 PROCEDURE — 99153 MOD SED SAME PHYS/QHP EA: CPT

## 2019-06-19 PROCEDURE — 71275 CT ANGIOGRAPHY CHEST: CPT

## 2019-06-19 PROCEDURE — 97162 PT EVAL MOD COMPLEX 30 MIN: CPT

## 2019-06-19 PROCEDURE — 87102 FUNGUS ISOLATION CULTURE: CPT

## 2019-06-19 PROCEDURE — 93306 TTE W/DOPPLER COMPLETE: CPT

## 2019-06-19 PROCEDURE — 96374 THER/PROPH/DIAG INJ IV PUSH: CPT | Mod: XU

## 2019-06-19 PROCEDURE — 83605 ASSAY OF LACTIC ACID: CPT

## 2019-06-19 PROCEDURE — 82570 ASSAY OF URINE CREATININE: CPT

## 2019-06-19 PROCEDURE — 85610 PROTHROMBIN TIME: CPT

## 2019-06-19 PROCEDURE — 87206 SMEAR FLUORESCENT/ACID STAI: CPT

## 2019-06-19 PROCEDURE — 87150 DNA/RNA AMPLIFIED PROBE: CPT

## 2019-06-19 RX ORDER — HEPARIN SODIUM 5000 [USP'U]/ML
5000 INJECTION INTRAVENOUS; SUBCUTANEOUS ONCE
Refills: 0 | Status: DISCONTINUED | OUTPATIENT
Start: 2019-06-19 | End: 2019-06-19

## 2019-06-19 RX ORDER — HEPARIN SODIUM 5000 [USP'U]/ML
100 INJECTION INTRAVENOUS; SUBCUTANEOUS ONCE
Refills: 0 | Status: DISCONTINUED | OUTPATIENT
Start: 2019-06-19 | End: 2019-06-19

## 2019-06-19 RX ORDER — ELECTROLYTE SOLUTION,INJ
1 VIAL (ML) INTRAVENOUS
Refills: 0 | Status: DISCONTINUED | OUTPATIENT
Start: 2019-06-19 | End: 2019-06-19

## 2019-06-19 RX ADMIN — CHLORHEXIDINE GLUCONATE 15 MILLILITER(S): 213 SOLUTION TOPICAL at 09:53

## 2019-06-19 RX ADMIN — SODIUM CHLORIDE 3 MILLILITER(S): 9 INJECTION INTRAMUSCULAR; INTRAVENOUS; SUBCUTANEOUS at 02:33

## 2019-06-19 RX ADMIN — CHLORHEXIDINE GLUCONATE 1 APPLICATION(S): 213 SOLUTION TOPICAL at 06:53

## 2019-06-19 RX ADMIN — FLUDROCORTISONE ACETATE 0.1 MILLIGRAM(S): 0.1 TABLET ORAL at 06:54

## 2019-06-19 RX ADMIN — MIDODRINE HYDROCHLORIDE 5 MILLIGRAM(S): 2.5 TABLET ORAL at 06:53

## 2019-06-19 RX ADMIN — Medication 0.5 MILLIGRAM(S): at 12:33

## 2019-06-19 RX ADMIN — Medication 3 MILLILITER(S): at 06:53

## 2019-06-19 RX ADMIN — NYSTATIN CREAM 1 APPLICATION(S): 100000 CREAM TOPICAL at 06:55

## 2019-06-19 RX ADMIN — SODIUM CHLORIDE 3 MILLILITER(S): 9 INJECTION INTRAMUSCULAR; INTRAVENOUS; SUBCUTANEOUS at 06:53

## 2019-06-19 RX ADMIN — Medication 3 MILLILITER(S): at 01:18

## 2019-06-19 RX ADMIN — SODIUM CHLORIDE 3 MILLILITER(S): 9 INJECTION INTRAMUSCULAR; INTRAVENOUS; SUBCUTANEOUS at 09:53

## 2019-06-19 RX ADMIN — MIDODRINE HYDROCHLORIDE 5 MILLIGRAM(S): 2.5 TABLET ORAL at 13:48

## 2019-06-19 RX ADMIN — SODIUM CHLORIDE 3 MILLILITER(S): 9 INJECTION INTRAMUSCULAR; INTRAVENOUS; SUBCUTANEOUS at 13:48

## 2019-06-19 RX ADMIN — Medication 3 MILLILITER(S): at 12:33

## 2019-06-19 NOTE — PROGRESS NOTE ADULT - SUBJECTIVE AND OBJECTIVE BOX
GYN Progress Note    Patient seen at bedside.  Reports feeling well today; eager for discharge to [facility].  Denies chest pain/SOB, tolerating CPAP at this time.  Remains on TPN.      Vital Signs Last 24 Hrs  T(C): 36.8 (2019 09:34), Max: 36.9 (2019 21:25)  T(F): 98.2 (2019 09:34), Max: 98.5 (2019 21:25)  HR: 80 (2019 09:59) (72 - 87)  BP: 124/72 (2019 09:34) (112/64 - 128/78)  BP(mean): --  RR: 18 (2019 09:34) (11 - 19)  SpO2: 100% (2019 09:59) (97% - 100%)    Physical Exam:  Gen: No Acute Distress  Pulm: trach, tolerating CPAP, upper airway sounds  GI: soft, nontender, mildly distended, +BS, no rebound, no guarding.  Ostomy functioning well  Ext: SCDs in place, wnl.  Warm and well perfused    I&O's Summary    2019 07:01  -  2019 07:00  --------------------------------------------------------  IN: 0 mL / OUT: 1875 mL / NET: -1875 mL      MEDICATIONS  (STANDING):  albumin human  5% IVPB 2750 milliLiter(s) IV Intermittent once  albumin human  5% IVPB 2750 milliLiter(s) IV Intermittent once  ALBUTerol/ipratropium for Nebulization 3 milliLiter(s) Nebulizer every 6 hours  calcium gluconate IVPB 1 Gram(s) IV Intermittent once  calcium gluconate IVPB 1 Gram(s) IV Intermittent once  chlorhexidine 0.12% Liquid 15 milliLiter(s) Oral Mucosa every 12 hours  chlorhexidine 2% Cloths 1 Application(s) Topical <User Schedule>  dextrose 5%. 1000 milliLiter(s) (50 mL/Hr) IV Continuous <Continuous>  dextrose 50% Injectable 25 Gram(s) IV Push once  enoxaparin Injectable 40 milliGRAM(s) SubCutaneous every 24 hours  fludroCORTISONE 0.1 milliGRAM(s) Oral every 24 hours  heparin 1000 units/mL 2000 Unit(s) 2000 Unit(s) IV Push once  heparin 1000units/mL 4000 Unit(s) 4000 Unit(s) IV Push once  insulin regular  human corrective regimen sliding scale   SubCutaneous every 6 hours  LORazepam     Tablet 0.5 milliGRAM(s) Oral daily  midodrine 5 milliGRAM(s) Oral every 8 hours  morphine  - Injectable 2 milliGRAM(s) IV Push once  nystatin Cream 1 Application(s) Topical two times a day  Parenteral Nutrition - Adult 1 Each (63 mL/Hr) TPN Continuous <Continuous>  Parenteral Nutrition - Adult 1 Each (63 mL/Hr) TPN Continuous <Continuous>  sodium chloride 3%  Inhalation 3 milliLiter(s) Inhalation every 4 hours    MEDICATIONS  (PRN):  acetaminophen    Suspension .. 1000 milliGRAM(s) Oral every 8 hours PRN Moderate Pain (4 - 6)  dextrose 40% Gel 15 Gram(s) Oral once PRN Blood Glucose LESS THAN 70 milliGRAM(s)/deciliter  glucagon  Injectable 1 milliGRAM(s) IntraMuscular once PRN Glucose LESS THAN 70 milligrams/deciliter  lidocaine 2% Gel 1 Application(s) Topical two times a day PRN pain around PEG site  ondansetron Injectable 4 milliGRAM(s) IV Push every 6 hours PRN Nausea and/or Vomiting  sodium chloride 0.9% lock flush 10 milliLiter(s) IV Push every 1 hour PRN Pre/post blood products, medications, blood draw, and to maintain line patency      LABS:                        7.4    11.22 )-----------( 241      ( 2019 08:10 )             23.6         133<L>  |  97  |  40<H>  ----------------------------<  182<H>  4.1   |  25  |  0.61    Ca    10.2      2019 08:10  Phos  4.4       Mg     1.9         TPro  7.7  /  Alb  3.6  /  TBili  0.3  /  DBili  x   /  AST  18  /  ALT  14  /  AlkPhos  129<H>        Urinalysis Basic - ( 2019 14:59 )    Color: Yellow / Appearance: Clear / S.020 / pH: x  Gluc: x / Ketone: NEGATIVE  / Bili: Negative / Urobili: 0.2 E.U./dL   Blood: x / Protein: 100 mg/dL / Nitrite: NEGATIVE   Leuk Esterase: Large / RBC: > 10 /HPF / WBC Many /HPF   Sq Epi: x / Non Sq Epi: 0-5 /HPF / Bacteria: Present /HPF

## 2019-06-19 NOTE — PROGRESS NOTE ADULT - REASON FOR ADMISSION
sepsis
Sepsis, ESBL Proteus Bacteremia, MRSA PNA, VRE UTI
sepsis

## 2019-06-20 ENCOUNTER — INBOUND DOCUMENT (OUTPATIENT)
Age: 59
End: 2019-06-20

## 2019-06-20 LAB
-  AMPICILLIN/SULBACTAM: SIGNIFICANT CHANGE UP
-  AMPICILLIN/SULBACTAM: SIGNIFICANT CHANGE UP
-  AMPICILLIN: SIGNIFICANT CHANGE UP
-  CEFAZOLIN: SIGNIFICANT CHANGE UP
-  CEFTRIAXONE: SIGNIFICANT CHANGE UP
-  CEFTRIAXONE: SIGNIFICANT CHANGE UP
-  CIPROFLOXACIN: SIGNIFICANT CHANGE UP
-  GENTAMICIN: SIGNIFICANT CHANGE UP
-  GENTAMICIN: SIGNIFICANT CHANGE UP
-  PIPERACILLIN/TAZOBACTAM: SIGNIFICANT CHANGE UP
-  TOBRAMYCIN: SIGNIFICANT CHANGE UP
-  TOBRAMYCIN: SIGNIFICANT CHANGE UP
-  TRIMETHOPRIM/SULFAMETHOXAZOLE: SIGNIFICANT CHANGE UP
-  TRIMETHOPRIM/SULFAMETHOXAZOLE: SIGNIFICANT CHANGE UP
CULTURE RESULTS: SIGNIFICANT CHANGE UP
CULTURE RESULTS: SIGNIFICANT CHANGE UP
METHOD TYPE: SIGNIFICANT CHANGE UP
METHOD TYPE: SIGNIFICANT CHANGE UP
ORGANISM # SPEC MICROSCOPIC CNT: SIGNIFICANT CHANGE UP
SPECIMEN SOURCE: SIGNIFICANT CHANGE UP
SPECIMEN SOURCE: SIGNIFICANT CHANGE UP

## 2019-06-22 LAB
CULTURE RESULTS: SIGNIFICANT CHANGE UP
CULTURE RESULTS: SIGNIFICANT CHANGE UP
SPECIMEN SOURCE: SIGNIFICANT CHANGE UP
SPECIMEN SOURCE: SIGNIFICANT CHANGE UP

## 2019-06-26 DIAGNOSIS — N39.0 URINARY TRACT INFECTION, SITE NOT SPECIFIED: ICD-10-CM

## 2019-06-26 DIAGNOSIS — J95.851 VENTILATOR ASSOCIATED PNEUMONIA: ICD-10-CM

## 2019-06-26 DIAGNOSIS — C54.1 MALIGNANT NEOPLASM OF ENDOMETRIUM: ICD-10-CM

## 2019-06-26 DIAGNOSIS — F33.2 MAJOR DEPRESSIVE DISORDER, RECURRENT SEVERE WITHOUT PSYCHOTIC FEATURES: ICD-10-CM

## 2019-06-26 DIAGNOSIS — J04.10 ACUTE TRACHEITIS WITHOUT OBSTRUCTION: ICD-10-CM

## 2019-06-26 DIAGNOSIS — Z99.11 DEPENDENCE ON RESPIRATOR [VENTILATOR] STATUS: ICD-10-CM

## 2019-06-26 DIAGNOSIS — J96.01 ACUTE RESPIRATORY FAILURE WITH HYPOXIA: ICD-10-CM

## 2019-06-26 DIAGNOSIS — R65.21 SEVERE SEPSIS WITH SEPTIC SHOCK: ICD-10-CM

## 2019-06-26 DIAGNOSIS — B95.62 METHICILLIN RESISTANT STAPHYLOCOCCUS AUREUS INFECTION AS THE CAUSE OF DISEASES CLASSIFIED ELSEWHERE: ICD-10-CM

## 2019-06-26 DIAGNOSIS — R06.89 OTHER ABNORMALITIES OF BREATHING: ICD-10-CM

## 2019-06-26 DIAGNOSIS — G72.81 CRITICAL ILLNESS MYOPATHY: ICD-10-CM

## 2019-06-26 DIAGNOSIS — J96.10 CHRONIC RESPIRATORY FAILURE, UNSPECIFIED WHETHER WITH HYPOXIA OR HYPERCAPNIA: ICD-10-CM

## 2019-06-26 DIAGNOSIS — R33.9 RETENTION OF URINE, UNSPECIFIED: ICD-10-CM

## 2019-06-26 DIAGNOSIS — I82.612 ACUTE EMBOLISM AND THROMBOSIS OF SUPERFICIAL VEINS OF LEFT UPPER EXTREMITY: ICD-10-CM

## 2019-06-26 DIAGNOSIS — Z93.3 COLOSTOMY STATUS: ICD-10-CM

## 2019-06-26 DIAGNOSIS — R13.19 OTHER DYSPHAGIA: ICD-10-CM

## 2019-06-26 DIAGNOSIS — G93.41 METABOLIC ENCEPHALOPATHY: ICD-10-CM

## 2019-06-26 DIAGNOSIS — A41.9 SEPSIS, UNSPECIFIED ORGANISM: ICD-10-CM

## 2019-06-26 DIAGNOSIS — N82.3 FISTULA OF VAGINA TO LARGE INTESTINE: ICD-10-CM

## 2019-06-26 DIAGNOSIS — G61.0 GUILLAIN-BARRE SYNDROME: ICD-10-CM

## 2019-06-26 DIAGNOSIS — L89.152 PRESSURE ULCER OF SACRAL REGION, STAGE 2: ICD-10-CM

## 2019-06-26 DIAGNOSIS — Z96.0 PRESENCE OF UROGENITAL IMPLANTS: ICD-10-CM

## 2019-06-26 DIAGNOSIS — A41.51 SEPSIS DUE TO ESCHERICHIA COLI [E. COLI]: ICD-10-CM

## 2019-06-28 ENCOUNTER — EMERGENCY (EMERGENCY)
Facility: HOSPITAL | Age: 59
LOS: 1 days | Discharge: HOME | End: 2019-06-28
Attending: EMERGENCY MEDICINE | Admitting: EMERGENCY MEDICINE
Payer: MEDICARE

## 2019-06-28 VITALS
SYSTOLIC BLOOD PRESSURE: 117 MMHG | TEMPERATURE: 98 F | DIASTOLIC BLOOD PRESSURE: 74 MMHG | RESPIRATION RATE: 18 BRPM | HEART RATE: 93 BPM | OXYGEN SATURATION: 100 %

## 2019-06-28 VITALS
SYSTOLIC BLOOD PRESSURE: 147 MMHG | HEART RATE: 105 BPM | OXYGEN SATURATION: 100 % | DIASTOLIC BLOOD PRESSURE: 80 MMHG | TEMPERATURE: 98 F | RESPIRATION RATE: 25 BRPM

## 2019-06-28 DIAGNOSIS — T82.524A DISPLACEMENT OF INFUSION CATHETER, INITIAL ENCOUNTER: ICD-10-CM

## 2019-06-28 DIAGNOSIS — Z90.710 ACQUIRED ABSENCE OF BOTH CERVIX AND UTERUS: Chronic | ICD-10-CM

## 2019-06-28 DIAGNOSIS — Z79.899 OTHER LONG TERM (CURRENT) DRUG THERAPY: ICD-10-CM

## 2019-06-28 DIAGNOSIS — R79.9 ABNORMAL FINDING OF BLOOD CHEMISTRY, UNSPECIFIED: ICD-10-CM

## 2019-06-28 DIAGNOSIS — Z88.8 ALLERGY STATUS TO OTHER DRUGS, MEDICAMENTS AND BIOLOGICAL SUBSTANCES: ICD-10-CM

## 2019-06-28 DIAGNOSIS — E11.9 TYPE 2 DIABETES MELLITUS WITHOUT COMPLICATIONS: ICD-10-CM

## 2019-06-28 DIAGNOSIS — Z79.4 LONG TERM (CURRENT) USE OF INSULIN: ICD-10-CM
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
## 2019-06-28 LAB
ALBUMIN SERPL ELPH-MCNC: 3.7 G/DL — SIGNIFICANT CHANGE UP (ref 3.3–5)
ALP SERPL-CCNC: 126 U/L — HIGH (ref 40–120)
ALT FLD-CCNC: 13 U/L — SIGNIFICANT CHANGE UP (ref 10–45)
ANION GAP SERPL CALC-SCNC: 12 MMOL/L — SIGNIFICANT CHANGE UP (ref 5–17)
APTT BLD: 21.6 SEC — LOW (ref 27.5–36.3)
AST SERPL-CCNC: 18 U/L — SIGNIFICANT CHANGE UP (ref 10–40)
BASOPHILS # BLD AUTO: 0.01 K/UL — SIGNIFICANT CHANGE UP (ref 0–0.2)
BASOPHILS NFR BLD AUTO: 0.1 % — SIGNIFICANT CHANGE UP (ref 0–2)
BILIRUB SERPL-MCNC: 0.5 MG/DL — SIGNIFICANT CHANGE UP (ref 0.2–1.2)
BLD GP AB SCN SERPL QL: NEGATIVE — SIGNIFICANT CHANGE UP
BUN SERPL-MCNC: 31 MG/DL — HIGH (ref 7–23)
CALCIUM SERPL-MCNC: 10.5 MG/DL — SIGNIFICANT CHANGE UP (ref 8.4–10.5)
CHLORIDE SERPL-SCNC: 101 MMOL/L — SIGNIFICANT CHANGE UP (ref 96–108)
CO2 SERPL-SCNC: 24 MMOL/L — SIGNIFICANT CHANGE UP (ref 22–31)
CREAT SERPL-MCNC: 0.63 MG/DL — SIGNIFICANT CHANGE UP (ref 0.5–1.3)
EOSINOPHIL # BLD AUTO: 0.08 K/UL — SIGNIFICANT CHANGE UP (ref 0–0.5)
EOSINOPHIL NFR BLD AUTO: 1 % — SIGNIFICANT CHANGE UP (ref 0–6)
GLUCOSE SERPL-MCNC: 125 MG/DL — HIGH (ref 70–99)
HCT VFR BLD CALC: 23.9 % — LOW (ref 34.5–45)
HGB BLD-MCNC: 7.2 G/DL — LOW (ref 11.5–15.5)
IMM GRANULOCYTES NFR BLD AUTO: 0.5 % — SIGNIFICANT CHANGE UP (ref 0–1.5)
INR BLD: 1.11 — SIGNIFICANT CHANGE UP (ref 0.88–1.16)
LYMPHOCYTES # BLD AUTO: 1.45 K/UL — SIGNIFICANT CHANGE UP (ref 1–3.3)
LYMPHOCYTES # BLD AUTO: 18.1 % — SIGNIFICANT CHANGE UP (ref 13–44)
MCHC RBC-ENTMCNC: 28.3 PG — SIGNIFICANT CHANGE UP (ref 27–34)
MCHC RBC-ENTMCNC: 30.1 GM/DL — LOW (ref 32–36)
MCV RBC AUTO: 94.1 FL — SIGNIFICANT CHANGE UP (ref 80–100)
MONOCYTES # BLD AUTO: 0.65 K/UL — SIGNIFICANT CHANGE UP (ref 0–0.9)
MONOCYTES NFR BLD AUTO: 8.1 % — SIGNIFICANT CHANGE UP (ref 2–14)
NEUTROPHILS # BLD AUTO: 5.8 K/UL — SIGNIFICANT CHANGE UP (ref 1.8–7.4)
NEUTROPHILS NFR BLD AUTO: 72.2 % — SIGNIFICANT CHANGE UP (ref 43–77)
NRBC # BLD: 0 /100 WBCS — SIGNIFICANT CHANGE UP (ref 0–0)
PLATELET # BLD AUTO: 192 K/UL — SIGNIFICANT CHANGE UP (ref 150–400)
POTASSIUM SERPL-MCNC: 4 MMOL/L — SIGNIFICANT CHANGE UP (ref 3.5–5.3)
POTASSIUM SERPL-SCNC: 4 MMOL/L — SIGNIFICANT CHANGE UP (ref 3.5–5.3)
PROT SERPL-MCNC: 8 G/DL — SIGNIFICANT CHANGE UP (ref 6–8.3)
PROTHROM AB SERPL-ACNC: 12.6 SEC — SIGNIFICANT CHANGE UP (ref 10–12.9)
RBC # BLD: 2.54 M/UL — LOW (ref 3.8–5.2)
RBC # FLD: 16.7 % — HIGH (ref 10.3–14.5)
RH IG SCN BLD-IMP: POSITIVE — SIGNIFICANT CHANGE UP
SODIUM SERPL-SCNC: 137 MMOL/L — SIGNIFICANT CHANGE UP (ref 135–145)
WBC # BLD: 8.03 K/UL — SIGNIFICANT CHANGE UP (ref 3.8–10.5)
WBC # FLD AUTO: 8.03 K/UL — SIGNIFICANT CHANGE UP (ref 3.8–10.5)

## 2019-06-28 PROCEDURE — 36573 INSJ PICC RS&I 5 YR+: CPT

## 2019-06-28 PROCEDURE — 99284 EMERGENCY DEPT VISIT MOD MDM: CPT

## 2019-06-28 PROCEDURE — 71045 X-RAY EXAM CHEST 1 VIEW: CPT | Mod: 26

## 2019-06-28 NOTE — ED PROVIDER NOTE - OBJECTIVE STATEMENT
60 y/o f tx from NH for possibly low hgb of 6.5 and PICC line displacement in right arm used for TPN.  Pt feeling improved from discharge.  Denies active bleeding.  Anemia seems to be secondary to anemia of chronic disease.    Prior d/c summary below:  Patient is a 59 year old female w/ post-polio syndrome, Stage IV Endometrial Carcinoma s/p staging surgery 7/2018 and 1 cycle of chemo in 2/2019, s/p exlap, end colostomy, rectovaginal fistula w/ recurrent admissions UTIs/bacteremia, who presented from [facility] in urosepsis, intubated in ED for airway protection and admitted to MICU. Patient was weaned off levophed and was extubated on 5/2 then transferred to GYN service. Both urine and initial blood culture growing ESBL e coli sensitive to meropenem and transitioned to ertapenem. Pt then transferred to stepdown unit for worsening neuro exam, concern for airway protection. He was started on IVIG (Privigen) 5/7. In 5/8 am, patient intubated on 7Lac for hypercapneic respiratory failure and stepped up to MICU for CO2 narcosis 2/2 respiratory muscle insufficiency from AIDP. In the MICU, patient received 3 more doses of IVIG (total 4 doses) with good response. She was extubated on 5/10 and was stable until 5/13 when she was reintubated again for CO2 narcosis. Patient underwent tx w plasmapheresis. Patient was made DNR. Tracheostomy placed as patient was never fully able to wean from ventilator, though she did tolerate CPAP trials. PEG was placed on 5/21, patient with antibiotic course of vanc/meropenem for additional fever, found to have MRSA in sputum and treated as VAP with linezolid. Patient had tunneled HD cath placed and patient received additional 2 days of plasmapheresis that completed on 6/6. Patient also treated for 7 day course of vanc and zosyn for tracheitis. Patient's enterovaginal fistula re-opened as seen on CT abdomen and pelvis. She was started on TPN, which she will be discharged on. TPN administration will be re-addressed by GYN during next admission. Patient HD stable for discharge and will return from [facility] on 7/1/19 at noon for next round of plasmapheresis. She will be a direct admit from the [facility] to the neurology service under Dr. Sellers. Current plan is to receive 2 rounds of plex every month for three months and then neurology will re-evaluate necessity for further treatments.

## 2019-06-28 NOTE — ED ADULT NURSE NOTE - CHPI ED NUR SYMPTOMS NEG
no dizziness/no decreased eating/drinking/no chills/no vomiting/no fever/no nausea/no pain/no weakness/no tingling

## 2019-06-28 NOTE — ED PROVIDER NOTE - CARE PROVIDER_API CALL
Jose Maria Sellers)  Neurology; Neuromuscular Medicine  [address], [address] [place] [address]  Phone: [phone]  Fax: [phone]  Follow Up Time:

## 2019-06-28 NOTE — ED ADULT NURSE NOTE - PSH
History of total abdominal hysterectomy and bilateral salpingo-oophorectomy  with resection of endometrial mass, low anterior resection and pelvic lymphadenectomy

## 2019-06-28 NOTE — ED PROVIDER NOTE - CLINICAL SUMMARY MEDICAL DECISION MAKING FREE TEXT BOX
Pt sent from NH for anemia and picc line displacement.  hgb 7.2 baseline.  PICC reinserted by IR.   involved.  GYN notified and eval pt in ED.  Dr. Sellers contacted and aware.  No need for admission at this time.  PT may return monday for plasmapheresis.      NH notified and aware pt to return.

## 2019-06-28 NOTE — ED PROVIDER NOTE - NSFOLLOWUPINSTRUCTIONS_ED_ALL_ED_FT
Return to [facility] on 7/1 at noon for next round of plasmapheresis.  LITO to give signout to Dr. Sellers on day prior to admission.  Call Dr. Sellers [phone].  Peripheral Intravenous Catheter Placement, Adult  ImageA peripheral intravenous (IV) catheter is a small, flexible tube that can be inserted into a vein. You may need an IV catheter in order to:  Be given fluids, blood, liquid nutrition, or medicine.  Have certain tests.  Provide multiple blood samples.  A peripheral IV catheter is usually placed in a vein in the hand or arm, but it can also be placed in a vein in the scalp, leg, or foot.    Tell a health care provider about:  Any allergies you have.  All medicines you are taking, including vitamins, herbs, eye drops, creams, and over-the-counter medicines.  Any problems you or family members have had with anesthetic medicines.  Any blood disorders you have.  Any surgeries you have had.  Any medical conditions you have.  Any hemodialysis shunts or fistulas you have.  Whether you have had a mastectomy or axillary node dissection.  Any blood clots you have in your arms or legs.  Whether you are pregnant or may be pregnant.  What are the risks?  Generally, this is a safe procedure. However, problems may occur, including:  Not being able to find a good place for the catheter.  Pain during catheter placement.  Slight bruising.  More serious complications are rare, but they can include:  Pain that does not fade after the catheter is in place.  Puncture in the wall of the vein.  Inflammation of the vein (phlebitis).  Leaking of fluid and medicine from the IV catheter into the skin (infiltration). This may lead to swelling and pain in the area where the IV catheter was.  Vein damage that causes bleeding under the skin.  Infection.  What happens before the procedure?  No preparation is needed for this procedure.    What happens during the procedure?  A germ-killing solution (antiseptic) or swab will be used to clean your skin.  A numbing medicine (local anesthetic) may be put on the skin where the IV catheter will be placed.  A tourniquet will be placed above the vein that will be used.  The vein will be felt and tapped to find the best spot to insert the catheter.  A sharp needle will be used to enter the vein and place the catheter.  The catheter will be flushed with a liquid called normal saline to make sure the catheter works.  The tourniquet and needle will be removed.  An IV tube may be attached to give fluids, medicine, or blood.  Samples of blood may be taken for testing.  Tape or another material will be used to hold the IV catheter in place.  What happens after the procedure?  After the procedure:  Your health care provider will check the area to make sure:  There are no signs of infection.  There is no swelling.  Fluid is flowing through the catheter properly.  Your health care provider may remove the tape that holds the IV catheter in place, clean the skin, and replace the tape.  When you no longer need the catheter, a health care provider will disconnect the IV tubing, remove the catheter, and put a bandage (dressing) over the catheter site.  While the catheter is in:  Do not get the catheter wet. Ask your health care provider to wrap the catheter if you want to shower.  Do not pull on the catheter or tubing. This can make the catheter come out from the vein.  Tell your health care provider right away if:  You have pain, redness, swelling, or leakage where the catheter is located.  The dressing or tape is loose.  The catheter falls out.  The catheter or tubing was pulled.  This information is not intended to replace advice given to you by your health care provider. Make sure you discuss any questions you have with your health care provider.    Document Released: 05/21/2012 Document Revised: 05/25/2017 Document Reviewed: 07/01/2016  Elsevier Interactive Patient Education © 2019 [employer]

## 2019-07-01 ENCOUNTER — INPATIENT (INPATIENT)
Facility: HOSPITAL | Age: 59
LOS: 0 days | Discharge: NURSING, REHAB OR LONG-TERM CARE | DRG: 73 | End: 2019-07-02
Attending: PSYCHIATRY & NEUROLOGY | Admitting: PSYCHIATRY & NEUROLOGY
Payer: MEDICARE

## 2019-07-01 ENCOUNTER — INBOUND DOCUMENT (OUTPATIENT)
Age: 59
End: 2019-07-01

## 2019-07-01 ENCOUNTER — OUTPATIENT (OUTPATIENT)
Dept: OUTPATIENT SERVICES | Facility: HOSPITAL | Age: 59
LOS: 1 days | End: 2019-07-01
Payer: MEDICARE

## 2019-07-01 VITALS
OXYGEN SATURATION: 100 % | DIASTOLIC BLOOD PRESSURE: 80 MMHG | RESPIRATION RATE: 13 BRPM | HEART RATE: 87 BPM | SYSTOLIC BLOOD PRESSURE: 125 MMHG | WEIGHT: 101.41 LBS | TEMPERATURE: 98 F

## 2019-07-01 DIAGNOSIS — Z85.42 PERSONAL HISTORY OF MALIGNANT NEOPLASM OF OTHER PARTS OF UTERUS: ICD-10-CM

## 2019-07-01 DIAGNOSIS — Z29.9 ENCOUNTER FOR PROPHYLACTIC MEASURES, UNSPECIFIED: ICD-10-CM

## 2019-07-01 DIAGNOSIS — J96.92 RESPIRATORY FAILURE, UNSPECIFIED WITH HYPERCAPNIA: ICD-10-CM

## 2019-07-01 DIAGNOSIS — R63.8 OTHER SYMPTOMS AND SIGNS CONCERNING FOOD AND FLUID INTAKE: ICD-10-CM

## 2019-07-01 DIAGNOSIS — G61.0 GUILLAIN-BARRE SYNDROME: ICD-10-CM

## 2019-07-01 DIAGNOSIS — R33.9 RETENTION OF URINE, UNSPECIFIED: ICD-10-CM

## 2019-07-01 DIAGNOSIS — G90.9 DISORDER OF THE AUTONOMIC NERVOUS SYSTEM, UNSPECIFIED: ICD-10-CM

## 2019-07-01 DIAGNOSIS — Z90.710 ACQUIRED ABSENCE OF BOTH CERVIX AND UTERUS: Chronic | ICD-10-CM

## 2019-07-01 DIAGNOSIS — E11.9 TYPE 2 DIABETES MELLITUS WITHOUT COMPLICATIONS: ICD-10-CM

## 2019-07-01 DIAGNOSIS — G14 POSTPOLIO SYNDROME: ICD-10-CM

## 2019-07-01 DIAGNOSIS — D63.8 ANEMIA IN OTHER CHRONIC DISEASES CLASSIFIED ELSEWHERE: ICD-10-CM

## 2019-07-01 DIAGNOSIS — C54.1 MALIGNANT NEOPLASM OF ENDOMETRIUM: ICD-10-CM

## 2019-07-01 DIAGNOSIS — Z91.89 OTHER SPECIFIED PERSONAL RISK FACTORS, NOT ELSEWHERE CLASSIFIED: ICD-10-CM

## 2019-07-01 PROBLEM — Z00.00 ENCOUNTER FOR PREVENTIVE HEALTH EXAMINATION: Noted: 2019-07-01

## 2019-07-01 LAB
ALBUMIN SERPL ELPH-MCNC: 3.1 G/DL — LOW (ref 3.3–5)
ALP SERPL-CCNC: 109 U/L — SIGNIFICANT CHANGE UP (ref 40–120)
ALT FLD-CCNC: 12 U/L — SIGNIFICANT CHANGE UP (ref 10–45)
ANION GAP SERPL CALC-SCNC: 14 MMOL/L — SIGNIFICANT CHANGE UP (ref 5–17)
APTT BLD: 29.3 SEC — SIGNIFICANT CHANGE UP (ref 27.5–36.3)
AST SERPL-CCNC: 21 U/L — SIGNIFICANT CHANGE UP (ref 10–40)
BASOPHILS # BLD AUTO: 0.01 K/UL — SIGNIFICANT CHANGE UP (ref 0–0.2)
BASOPHILS NFR BLD AUTO: 0.1 % — SIGNIFICANT CHANGE UP (ref 0–2)
BILIRUB SERPL-MCNC: 0.4 MG/DL — SIGNIFICANT CHANGE UP (ref 0.2–1.2)
BLD GP AB SCN SERPL QL: NEGATIVE — SIGNIFICANT CHANGE UP
BUN SERPL-MCNC: 35 MG/DL — HIGH (ref 7–23)
CALCIUM SERPL-MCNC: 10 MG/DL — SIGNIFICANT CHANGE UP (ref 8.4–10.5)
CHLORIDE SERPL-SCNC: 103 MMOL/L — SIGNIFICANT CHANGE UP (ref 96–108)
CO2 SERPL-SCNC: 20 MMOL/L — LOW (ref 22–31)
CREAT SERPL-MCNC: 0.63 MG/DL — SIGNIFICANT CHANGE UP (ref 0.5–1.3)
EOSINOPHIL # BLD AUTO: 0.08 K/UL — SIGNIFICANT CHANGE UP (ref 0–0.5)
EOSINOPHIL NFR BLD AUTO: 1 % — SIGNIFICANT CHANGE UP (ref 0–6)
GLUCOSE BLDC GLUCOMTR-MCNC: 111 MG/DL — HIGH (ref 70–99)
GLUCOSE SERPL-MCNC: 112 MG/DL — HIGH (ref 70–99)
HCT VFR BLD CALC: 20.9 % — CRITICAL LOW (ref 34.5–45)
HGB BLD-MCNC: 6.3 G/DL — CRITICAL LOW (ref 11.5–15.5)
IMM GRANULOCYTES NFR BLD AUTO: 0.4 % — SIGNIFICANT CHANGE UP (ref 0–1.5)
INR BLD: 1.14 — SIGNIFICANT CHANGE UP (ref 0.88–1.16)
LYMPHOCYTES # BLD AUTO: 1.46 K/UL — SIGNIFICANT CHANGE UP (ref 1–3.3)
LYMPHOCYTES # BLD AUTO: 18.5 % — SIGNIFICANT CHANGE UP (ref 13–44)
MAGNESIUM SERPL-MCNC: 1.8 MG/DL — SIGNIFICANT CHANGE UP (ref 1.6–2.6)
MCHC RBC-ENTMCNC: 28.8 PG — SIGNIFICANT CHANGE UP (ref 27–34)
MCHC RBC-ENTMCNC: 30.1 GM/DL — LOW (ref 32–36)
MCV RBC AUTO: 95.4 FL — SIGNIFICANT CHANGE UP (ref 80–100)
MONOCYTES # BLD AUTO: 0.53 K/UL — SIGNIFICANT CHANGE UP (ref 0–0.9)
MONOCYTES NFR BLD AUTO: 6.7 % — SIGNIFICANT CHANGE UP (ref 2–14)
NEUTROPHILS # BLD AUTO: 5.78 K/UL — SIGNIFICANT CHANGE UP (ref 1.8–7.4)
NEUTROPHILS NFR BLD AUTO: 73.3 % — SIGNIFICANT CHANGE UP (ref 43–77)
NRBC # BLD: 0 /100 WBCS — SIGNIFICANT CHANGE UP (ref 0–0)
PLATELET # BLD AUTO: 170 K/UL — SIGNIFICANT CHANGE UP (ref 150–400)
POTASSIUM SERPL-MCNC: 3.9 MMOL/L — SIGNIFICANT CHANGE UP (ref 3.5–5.3)
POTASSIUM SERPL-SCNC: 3.9 MMOL/L — SIGNIFICANT CHANGE UP (ref 3.5–5.3)
PROT SERPL-MCNC: 7.1 G/DL — SIGNIFICANT CHANGE UP (ref 6–8.3)
PROTHROM AB SERPL-ACNC: 12.9 SEC — SIGNIFICANT CHANGE UP (ref 10–12.9)
RBC # BLD: 2.19 M/UL — LOW (ref 3.8–5.2)
RBC # FLD: 16.5 % — HIGH (ref 10.3–14.5)
RH IG SCN BLD-IMP: POSITIVE — SIGNIFICANT CHANGE UP
SODIUM SERPL-SCNC: 137 MMOL/L — SIGNIFICANT CHANGE UP (ref 135–145)
WBC # BLD: 7.89 K/UL — SIGNIFICANT CHANGE UP (ref 3.8–10.5)
WBC # FLD AUTO: 7.89 K/UL — SIGNIFICANT CHANGE UP (ref 3.8–10.5)

## 2019-07-01 PROCEDURE — G9001: CPT

## 2019-07-01 PROCEDURE — 99222 1ST HOSP IP/OBS MODERATE 55: CPT

## 2019-07-01 RX ORDER — DEXTROSE 50 % IN WATER 50 %
25 SYRINGE (ML) INTRAVENOUS ONCE
Refills: 0 | Status: DISCONTINUED | OUTPATIENT
Start: 2019-07-01 | End: 2019-07-02

## 2019-07-01 RX ORDER — IPRATROPIUM/ALBUTEROL SULFATE 18-103MCG
3 AEROSOL WITH ADAPTER (GRAM) INHALATION EVERY 6 HOURS
Refills: 0 | Status: DISCONTINUED | OUTPATIENT
Start: 2019-07-01 | End: 2019-07-02

## 2019-07-01 RX ORDER — GLUCAGON INJECTION, SOLUTION 0.5 MG/.1ML
1 INJECTION, SOLUTION SUBCUTANEOUS ONCE
Refills: 0 | Status: DISCONTINUED | OUTPATIENT
Start: 2019-07-01 | End: 2019-07-02

## 2019-07-01 RX ORDER — NYSTATIN CREAM 100000 [USP'U]/G
1 CREAM TOPICAL
Refills: 0 | Status: DISCONTINUED | OUTPATIENT
Start: 2019-07-01 | End: 2019-07-02

## 2019-07-01 RX ORDER — LIDOCAINE 4 G/100G
1 CREAM TOPICAL
Refills: 0 | Status: DISCONTINUED | OUTPATIENT
Start: 2019-07-01 | End: 2019-07-02

## 2019-07-01 RX ORDER — SODIUM CHLORIDE 9 MG/ML
1000 INJECTION, SOLUTION INTRAVENOUS
Refills: 0 | Status: DISCONTINUED | OUTPATIENT
Start: 2019-07-01 | End: 2019-07-02

## 2019-07-01 RX ORDER — FLUDROCORTISONE ACETATE 0.1 MG/1
0.1 TABLET ORAL EVERY 24 HOURS
Refills: 0 | Status: DISCONTINUED | OUTPATIENT
Start: 2019-07-01 | End: 2019-07-02

## 2019-07-01 RX ORDER — CHLORHEXIDINE GLUCONATE 213 G/1000ML
15 SOLUTION TOPICAL
Refills: 0 | Status: DISCONTINUED | OUTPATIENT
Start: 2019-07-01 | End: 2019-07-02

## 2019-07-01 RX ORDER — ACETAMINOPHEN 500 MG
1000 TABLET ORAL EVERY 8 HOURS
Refills: 0 | Status: DISCONTINUED | OUTPATIENT
Start: 2019-07-01 | End: 2019-07-02

## 2019-07-01 RX ORDER — INSULIN LISPRO 100/ML
VIAL (ML) SUBCUTANEOUS
Refills: 0 | Status: DISCONTINUED | OUTPATIENT
Start: 2019-07-01 | End: 2019-07-02

## 2019-07-01 RX ORDER — CALCIUM GLUCONATE 100 MG/ML
1 VIAL (ML) INTRAVENOUS ONCE
Refills: 0 | Status: COMPLETED | OUTPATIENT
Start: 2019-07-01 | End: 2019-07-02

## 2019-07-01 RX ORDER — ASCORBIC ACID 60 MG
500 TABLET,CHEWABLE ORAL DAILY
Refills: 0 | Status: DISCONTINUED | OUTPATIENT
Start: 2019-07-01 | End: 2019-07-02

## 2019-07-01 RX ORDER — DEXTROSE 50 % IN WATER 50 %
15 SYRINGE (ML) INTRAVENOUS ONCE
Refills: 0 | Status: DISCONTINUED | OUTPATIENT
Start: 2019-07-01 | End: 2019-07-02

## 2019-07-01 RX ORDER — DEXTROSE 50 % IN WATER 50 %
12.5 SYRINGE (ML) INTRAVENOUS ONCE
Refills: 0 | Status: DISCONTINUED | OUTPATIENT
Start: 2019-07-01 | End: 2019-07-02

## 2019-07-01 RX ORDER — MIDODRINE HYDROCHLORIDE 2.5 MG/1
5 TABLET ORAL EVERY 8 HOURS
Refills: 0 | Status: DISCONTINUED | OUTPATIENT
Start: 2019-07-01 | End: 2019-07-02

## 2019-07-01 RX ORDER — ALBUMIN HUMAN 25 %
2750 VIAL (ML) INTRAVENOUS ONCE
Refills: 0 | Status: COMPLETED | OUTPATIENT
Start: 2019-07-02 | End: 2019-07-02

## 2019-07-01 RX ORDER — ENOXAPARIN SODIUM 100 MG/ML
40 INJECTION SUBCUTANEOUS EVERY 24 HOURS
Refills: 0 | Status: DISCONTINUED | OUTPATIENT
Start: 2019-07-01 | End: 2019-07-02

## 2019-07-01 RX ADMIN — MIDODRINE HYDROCHLORIDE 5 MILLIGRAM(S): 2.5 TABLET ORAL at 15:31

## 2019-07-01 RX ADMIN — Medication 3 MILLILITER(S): at 15:31

## 2019-07-01 RX ADMIN — CHLORHEXIDINE GLUCONATE 15 MILLILITER(S): 213 SOLUTION TOPICAL at 18:49

## 2019-07-01 RX ADMIN — Medication 1000 MILLIGRAM(S): at 20:36

## 2019-07-01 RX ADMIN — Medication 0.5 MILLIGRAM(S): at 23:00

## 2019-07-01 RX ADMIN — NYSTATIN CREAM 1 APPLICATION(S): 100000 CREAM TOPICAL at 18:48

## 2019-07-01 RX ADMIN — ENOXAPARIN SODIUM 40 MILLIGRAM(S): 100 INJECTION SUBCUTANEOUS at 18:48

## 2019-07-01 RX ADMIN — Medication 1000 MILLIGRAM(S): at 19:20

## 2019-07-01 RX ADMIN — Medication 3 MILLILITER(S): at 22:00

## 2019-07-01 RX ADMIN — MIDODRINE HYDROCHLORIDE 5 MILLIGRAM(S): 2.5 TABLET ORAL at 22:00

## 2019-07-01 NOTE — H&P ADULT - ASSESSMENT
Patient is a 58 yo F with PMHx polio, breast cancer, DM, Endometrial Cancer s/p exploratory laparotomy, enterolysis, SHAMIR, BSO, pelvic lymphadenectomy, low anterior resection mobilization of splenic flexure, end colostomy on 7/30/18, rectovaginal fistula, numerous admissions for urinary tract infection, AIDP, and recent hospital admission for sepsis now s/p trach and PEG presents to [facility] from [facility] for follow-up plasmapheresis treatment with Dr. Sellers. Patient is a 60 yo F with PMHx polio, breast cancer, DM, Endometrial Cancer s/p exploratory laparotomy, enterolysis, SHAMIR, BSO, pelvic lymphadenectomy, low anterior resection mobilization of splenic flexure, end colostomy on 7/30/18, rectovaginal fistula, numerous admissions for urinary tract infection, and recent hospital and ICU admission for hospital course complicated by multiple episodes of sepsis and hypercapnic respiratory failure likely 2/2 AIDP (s/p IVIG and plasmapheresis), now s/p trach and PEG ( 5/21) who presents to [facility] directly from [facility] for follow-up plasmapheresis treatment with Dr. Sellers. Patient is a 58 yo F with PMHx polio, breast cancer, DM, Endometrial Cancer s/p exploratory laparotomy, enterolysis, SHAMIR, BSO, pelvic lymphadenectomy, low anterior resection mobilization of splenic flexure, end colostomy on 7/30/18, rectovaginal fistula, numerous admissions for urinary tract infection, and recent hospital and ICU admission for hospital course complicated by multiple episodes of sepsis and hypercapnic respiratory failure likely 2/2 AIDP (s/p IVIG and plasmapheresis), now s/p trach and PEG ( 5/21) who presents to [facility] directly from [facility] for follow-up plasmapheresis treatment

## 2019-07-01 NOTE — H&P ADULT - PROBLEM SELECTOR PLAN 8
#PEG placement  - Pt s/p PEG placement 5/21   - flushes well per nursing. GI loosened bumper. pain improved  -NPO  -TPN for now. #PEG placement  - Pt s/p PEG placement 5/21   - flushes well per nursing. GI loosened bumper. pain improved  -NPO  -TPN for now. Will need to call TPN pharmacy in the am

## 2019-07-01 NOTE — H&P ADULT - PROBLEM SELECTOR PLAN 4
# Previous iron panel showing ACD. Previous admission s/p 1u pRBC's 5/18, 1u pRBC's 5/23, and 1u pRBCs on 6/3.  - Currently no source of bleeding or hemolysis  - Maintain active T&S.  - f/u CBC # Previous iron panel showing ACD. Previous admission s/p 1u pRBC's 5/18, 1u pRBC's 5/23, and 1u pRBCs on 6/3.  - Currently no source of bleeding or hemolysis  - Maintain active T&S.  - f/u CBC. Transfuse if Hgb <7

## 2019-07-01 NOTE — H&P ADULT - PROBLEM SELECTOR PLAN 7
#Hx of Stage IV endometrial adenocarcinoma;  - Per GYN-ONC notes, no anticipation of continuing chemotherapy #Hx of Stage IV endometrial adenocarcinoma;  - Per GYN-ONC notes, no anticipation of continuing chemotherapy  - GYN made aware of patient's admission. Follow up recs

## 2019-07-01 NOTE — H&P ADULT - PROBLEM SELECTOR PLAN 9
-IVF: none  -Monitor, Replete to K>4 and Mg>2  -Diet: continue TPN given enterovaginal fistula  -DVT: Lovenox, SCDs. -IVF: none  -Monitor, Replete to K>4 and Mg>2  -Diet: continue TPN; Holding PEG feeds given enterovaginal fistula  -DVT: Lovenox, SCDs.

## 2019-07-01 NOTE — H&P ADULT - PROBLEM SELECTOR PLAN 1
# Likely AIDP leading to respiratory compromise, previous EMG w demyelinating polyneuropathy.  - Neurology following  - s/p trach (5/21) and s/p previous rounds of IVIG and plasmapharesis  - returning and admitted for f/u plasmapharesis with Dr. Sellers ([phone]) # Likely AIDP leading to respiratory compromise, previous EMG w demyelinating polyneuropathy.  - Neurology following  - s/p trach (5/21) and s/p previous rounds of IVIG and plasmapharesis  - returning and admitted for f/u plasmapharesis with Dr. Sellers ([phone])  - plan for plasmapheresis tomorrow. Dr. García made aware # AIDP / CIDP leading to respiratory compromise, previous EMG w demyelinating polyneuropathy.  - Neurology following  - s/p trach (5/21) and s/p previous rounds of IVIG and plasmapharesis  - returning and admitted for f/u plasmapharesis   - plan for plasmapheresis tomorrow. Dr. García made aware

## 2019-07-01 NOTE — H&P ADULT - NSHPLABSRESULTS_GEN_ALL_CORE
LABS:                         6.3    7.89  )-----------( 170      ( 01 Jul 2019 14:53 )             20.9     07-01    137  |  103  |  35<H>  ----------------------------<  112<H>  3.9   |  20<L>  |  0.63    Ca    10.0      01 Jul 2019 14:53  Mg     1.8     07-01    TPro  7.1  /  Alb  3.1<L>  /  TBili  0.4  /  DBili  x   /  AST  21  /  ALT  12  /  AlkPhos  109  07-01    PT/INR - ( 01 Jul 2019 16:42 )   PT: 12.9 sec;   INR: 1.14          PTT - ( 01 Jul 2019 16:42 )  PTT:29.3 sec      RADIOLOGY, EKG & ADDITIONAL TESTS: Reviewed.

## 2019-07-01 NOTE — H&P ADULT - NSHPPHYSICALEXAM_GEN_ALL_CORE
.  VITAL SIGNS:  T(C): 36.6 (07-01-19 @ 12:55), Max: 36.6 (07-01-19 @ 12:55)  T(F): 97.9 (07-01-19 @ 12:55), Max: 97.9 (07-01-19 @ 12:55)  HR: 85 (07-01-19 @ 13:07) (85 - 87)  BP: 125/80 (07-01-19 @ 12:55) (125/80 - 125/80)  BP(mean): --  RR: 13 (07-01-19 @ 12:55) (13 - 13)  SpO2: 100% (07-01-19 @ 13:07) (100% - 100%)  Wt(kg): --    PHYSICAL EXAM:    Constitutional: WDWN resting comfortably in bed; conversing in short sentences, NAD  Head: NC/AT  Eyes: PERRL, EOMI, anicteric sclera  ENT: no nasal discharge; uvula midline, no oropharyngeal erythema or exudates; MMM  Neck: supple; no JVD or thyromegaly, tracheostomy present, no erythema noted  Respiratory: CTA B/L; no W/R/R, no retractions, poor inspiratory effort, tracheostomy connected to ventilator  Cardiac: +S1/S2; RRR; no M/R/G; PMI non-displaced  Gastrointestinal: soft, NT/ND; no rebound or guarding; PEG tube present, no erythema noted; Colostomy bag present, no erythema at site noted  Genitourinary: joseph catheter present  Extremities: no clubbing or cyanosis; no peripheral edema, cool extremities hands and feet  Skin: Right arm PICC line, no erythema noted; left chest double lumen catheter, no erythema noted  Neurologic: AAOx3; CNII-XII grossly intact; bilateral upper extremities motor 5/5, bilateral lower extremities motor 1/5, bilateral upper/lower sensation intact VITAL SIGNS:  T(C): 36.6 (07-01-19 @ 12:55), Max: 36.6 (07-01-19 @ 12:55)  T(F): 97.9 (07-01-19 @ 12:55), Max: 97.9 (07-01-19 @ 12:55)  HR: 85 (07-01-19 @ 13:07) (85 - 87)  BP: 125/80 (07-01-19 @ 12:55) (125/80 - 125/80)  BP(mean): --  RR: 13 (07-01-19 @ 12:55) (13 - 13)  SpO2: 100% (07-01-19 @ 13:07) (100% - 100%)  Wt(kg): --    PHYSICAL EXAM:    Constitutional: WDWN resting comfortably in bed; conversing in short sentences, NAD  Head: NC/AT  Eyes: PERRL, EOMI, anicteric sclera  ENT: no nasal discharge; uvula midline, no oropharyngeal erythema or exudates; MMM  Neck: supple; no JVD or thyromegaly, tracheostomy present, no erythema noted  Respiratory: CTA B/L; no W/R/R, no retractions, poor inspiratory effort, tracheostomy connected to ventilator  Cardiac: +S1/S2; RRR; no M/R/G; PMI non-displaced  Gastrointestinal: soft, NT/ND; no rebound or guarding; PEG tube present, no erythema noted; Colostomy bag present, no erythema at site noted  Genitourinary: joseph catheter present  Extremities: no clubbing or cyanosis; no peripheral edema, cool extremities hands and feet  Skin: Right arm PICC line, no erythema noted; left chest double lumen catheter, no erythema noted  Neurologic: AAOx3; CNII-XII grossly intact; bilateral upper extremities motor 5/5, bilateral lower extremities motor 1/5, bilateral upper/lower sensation intact VITAL SIGNS:  T(C): 36.6 (07-01-19 @ 12:55), Max: 36.6 (07-01-19 @ 12:55)  T(F): 97.9 (07-01-19 @ 12:55), Max: 97.9 (07-01-19 @ 12:55)  HR: 85 (07-01-19 @ 13:07) (85 - 87)  BP: 125/80 (07-01-19 @ 12:55) (125/80 - 125/80)  BP(mean): --  RR: 13 (07-01-19 @ 12:55) (13 - 13)  SpO2: 100% (07-01-19 @ 13:07) (100% - 100%)  Wt(kg): --    PHYSICAL EXAM:    Constitutional: WDWN resting comfortably in bed; conversing in short sentences, NAD  Head: NC/AT  Eyes: PERRL, EOMI, anicteric sclera  ENT: no nasal discharge; uvula midline, no oropharyngeal erythema or exudates; MMM  Neck: supple; no JVD or thyromegaly, tracheostomy present, no erythema noted  Respiratory: CTA B/L; no W/R/R, no retractions, poor inspiratory effort, tracheostomy connected to ventilator  Cardiac: +S1/S2; RRR; no M/R/G; PMI non-displaced  Gastrointestinal: soft, NT/ND; no rebound or guarding; PEG tube present, no erythema noted; Colostomy bag present, no erythema at site noted  Genitourinary: joseph catheter present  Extremities: no clubbing or cyanosis; no peripheral edema, cool extremities hands and feet  Skin: Right arm PICC line, no erythema noted; left chest double lumen catheter, no erythema noted  Neurologic: AAOx3; CNII-XII grossly intact; bilateral upper extremities motor 4/5, bilateral lower extremities motor 2/5, bilateral upper/lower sensation intact

## 2019-07-01 NOTE — H&P ADULT - NSICDXPASTMEDICALHX_GEN_ALL_CORE_FT
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

## 2019-07-01 NOTE — H&P ADULT - PROBLEM SELECTOR PLAN 2
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 Sellers  - C/w mechanical ventilation at night and CPAP during day. 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 Sellers  - C/w mechanical ventilation at night and CPAP during day. 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 Sellers  - C/w CPAP, wean as tolerated. AC/VC as needed for respiratory distress  - C/w frequent suctioning (q4h)  - continue duonebs

## 2019-07-01 NOTE — H&P ADULT - ATTENDING COMMENTS
Readmitted for maintenance PLEX treatment  On CPAP 24/7 now, good tidal volumes  Able to speak with trach in place  Strength is stable  Reflexes remain absent    Plan for PLEX tomorrow  Check coags, fibrinogen  d/c back to LT[facility] after treatment

## 2019-07-01 NOTE — H&P ADULT - NSHPSOURCEINFORD_GEN_ALL_CORE
Patient/Other Family Member/Physician/Provider Chart(s)/Other Family Member/Physician/Provider/Patient

## 2019-07-01 NOTE — CONSULT NOTE ADULT - PROBLEM SELECTOR RECOMMENDATION 9
Patient is scheduled to get plasma exchange tomorrow AM after neurology consult today and pending the neurology decision to continue with this treatment. The apheresis nurse, 7 Wo nurse and 7 Wo house staff were all counseled on the need for catheter dressing change and for neurology clearance. Apheresis orders will be written.

## 2019-07-01 NOTE — H&P ADULT - PROBLEM SELECTOR PLAN 3
# Pt previously on levophed for autonomic dysfunction 2/2 AIDP  - Continue florinef 0.1mg qd   - Continue midodrine 10mg TID. # Pt previously on levophed for autonomic dysfunction 2/2 AIDP  - Continue florinef 0.1mg qd   - Continue midodrine 10mg TID

## 2019-07-01 NOTE — CONSULT NOTE ADULT - SUBJECTIVE AND OBJECTIVE BOX
Asked by  Dr. Sellers to evaluate this patient for ongoing therapeutic plasma exchange. She is weill known to me from her previous admission, and she is referred for periodic TPE for treatment of acute and chronic demyelinating inflammatory polyneuropathy. Of note, she was unable to talk when she left the hospital, and she is now talking. She reported left hip pain for about one week, but no other complaints.      HPI:  Patient is a 60 yo F with PMHx of polio c/b post-polio syndrome breast cancer, DM, Endometrial Cancer s/p exploratory laparotomy, enterolysis, SHAMIR, BSO, pelvic lymphadenectomy, low anterior resection mobilization of splenic flexure, end colostomy on 7/30/18, rectovaginal fistula, numerous admissions for urinary tract infection, and recent hospital and ICU admission for hospital course complicated by multiple episodes of sepsis and hypercapnic respiratory failure likely 2/2 AIDP (s/p IVIG and plasmapheresis), now s/p trach and PEG ( 5/21) who presents to [facility] directly from [facility] for follow-up plasmapheresis treatment with Dr. Sellers. 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  Pleural effusion  Muscle weakness: generalized  Malignant neoplasm: endometrium  History of total abdominal hysterectomy and bilateral salpingo-oophorectomy: with resection of endometrial mass, low anterior resection and pelvic lymphadenectomy      MEDICATIONS  (STANDING):  ALBUTerol/ipratropium for Nebulization 3 milliLiter(s) Nebulizer every 6 hours  ascorbic acid 500 milliGRAM(s) Oral daily  chlorhexidine 0.12% Liquid 15 milliLiter(s) Oral Mucosa two times a day  enoxaparin Injectable 40 milliGRAM(s) SubCutaneous every 24 hours  fludroCORTISONE 0.1 milliGRAM(s) Oral every 24 hours  midodrine 5 milliGRAM(s) Oral every 8 hours  nystatin Cream 1 Application(s) Topical two times a day    MEDICATIONS  (PRN):  acetaminophen    Suspension .. 1000 milliGRAM(s) Oral every 8 hours PRN Temp greater or equal to 38C (100.4F), Moderate Pain (4 - 6)  lidocaine 2% Gel 1 Application(s) Topical two times a day PRN pain around PEG site  LORazepam     Tablet 0.5 milliGRAM(s) Oral at bedtime PRN Anxiety              Allergies    No Known Allergies    Intolerances    IVIG PRODUCT IS PRIGIVEN OR GAMMUNEX (Unknown)  PeriGuard (Pruritus (Mild to Mod))      REVIEW OF SYSTEMS: Notable for left hip pain    Vital Signs Last 24 Hrs  T(C): 36.6 (01 Jul 2019 12:55), Max: 36.6 (01 Jul 2019 12:55)  T(F): 97.9 (01 Jul 2019 12:55), Max: 97.9 (01 Jul 2019 12:55)  HR: 86 (01 Jul 2019 16:17) (85 - 87)  BP: 125/80 (01 Jul 2019 12:55) (125/80 - 125/80)  BP(mean): --  RR: 23 (01 Jul 2019 16:17) (13 - 23)  SpO2: 100% (01 Jul 2019 16:17) (100% - 100%)    Daily     Daily     PHYSICAL EXAM:  General: WN/WD NAD; lying in bed with trach and respirator dependent  Eyes: No jaundice  Respiratory: clear to auscultation  CV: RRR, no murmurs  Skin: No rash, no petechia  Catheters/Tubes/Wires: the dressing over the catheter is loose and needs to be changed; no tenderness at the catheter site                           6.3    7.89  )-----------( 170      ( 01 Jul 2019 14:53 )             20.9       Hematocrit: 20.9 % (07-01 @ 14:53)    07-01    137  |  103  |  35<H>  ----------------------------<  112<H>  3.9   |  20<L>  |  0.63    Ca    10.0      01 Jul 2019 14:53  Mg     1.8     07-01    TPro  7.1  /  Alb  3.1<L>  /  TBili  0.4  /  DBili  x   /  AST  21  /  ALT  12  /  AlkPhos  109  07-01

## 2019-07-01 NOTE — H&P ADULT - HISTORY OF PRESENT ILLNESS
Patient is a 58 yo F with PMHx polio, breast cancer, DM, Endometrial Cancer s/p exploratory laparotomy, enterolysis, SHAMIR, BSO, pelvic lymphadenectomy, low anterior resection mobilization of splenic flexure, end colostomy on 7/30/18, rectovaginal fistula, numerous admissions for urinary tract infection, AIDP, and recent hospital admission for sepsis now s/p trach and PEG presents to [facility] from [facility] for follow-up plasmapheresis treatment with Dr. Sellers. Patient is a 60 yo F with PMHx of polio c/b post-polio syndrome breast cancer, DM, Endometrial Cancer s/p exploratory laparotomy, enterolysis, SHAMIR, BSO, pelvic lymphadenectomy, low anterior resection mobilization of splenic flexure, end colostomy on 7/30/18, rectovaginal fistula, numerous admissions for urinary tract infection, and recent hospital and ICU admission for hospital course complicated by multiple episodes of sepsis and hypercapnic respiratory failure likely 2/2 AIDP (s/p IVIG and plasmapheresis), now s/p trach and PEG ( 5/21) who presents to [facility] directly from [facility] for follow-up plasmapheresis treatment with Dr. Sellers. Patient is a 60 yo F with PMHx of polio c/b post-polio syndrome, breast cancer, DM, Endometrial Cancer s/p exploratory laparotomy, enterolysis, SHAMIR, BSO, pelvic lymphadenectomy, low anterior resection mobilization of splenic flexure, end colostomy on 7/30/18, rectovaginal fistula, numerous admissions for urinary tract infection, and recent hospital and ICU admission for hospital course complicated by multiple episodes of sepsis and hypercapnic respiratory failure likely 2/2 AIDP (s/p IVIG and plasmapheresis), now s/p trach and PEG ( 5/21) who presents to [facility] directly from [facility] for follow-up plasmapheresis treatment with Dr. Sellers. On arrival VSS. Labs pending. Plan for plasmapheresis tomorrow.

## 2019-07-01 NOTE — H&P ADULT - PROBLEM SELECTOR PLAN 10
1) PCP: Dr. Sellers  Contacted on Admission: Yes  2) Date of Contact with PCP: 7/1/19  3) PCP Contacted at Discharge: (Y/N)  4) Summary of Handoff Given to PCP:   5) Post-Discharge Appointment Date and Location:

## 2019-07-01 NOTE — H&P ADULT - NSHPREVIEWOFSYSTEMS_GEN_ALL_CORE
REVIEW OF SYSTEMS:    CONSTITUTIONAL: No weakness, fevers or chills  EYES/ENT: No visual changes;  No vertigo or throat pain   NECK: No pain or stiffness  RESPIRATORY: No cough, wheezing, hemoptysis; No shortness of breath  CARDIOVASCULAR: No chest pain or palpitations, admits to tenderness near double lumen catheter site  GASTROINTESTINAL: No abdominal or epigastric pain. No nausea, vomiting, or hematemesis;  GENITOURINARY: No dysuria, frequency or hematuria  NEUROLOGICAL: Admits to bilateral lower extremity weakness  All other review of systems is negative unless indicated above.

## 2019-07-02 ENCOUNTER — TRANSCRIPTION ENCOUNTER (OUTPATIENT)
Age: 59
End: 2019-07-02

## 2019-07-02 VITALS
OXYGEN SATURATION: 98 % | TEMPERATURE: 98 F | DIASTOLIC BLOOD PRESSURE: 83 MMHG | RESPIRATION RATE: 16 BRPM | HEART RATE: 80 BPM | SYSTOLIC BLOOD PRESSURE: 139 MMHG

## 2019-07-02 LAB
ALBUMIN SERPL ELPH-MCNC: 3.5 G/DL — SIGNIFICANT CHANGE UP (ref 3.3–5)
ALP SERPL-CCNC: 119 U/L — SIGNIFICANT CHANGE UP (ref 40–120)
ALT FLD-CCNC: 14 U/L — SIGNIFICANT CHANGE UP (ref 10–45)
ANION GAP SERPL CALC-SCNC: 14 MMOL/L — SIGNIFICANT CHANGE UP (ref 5–17)
AST SERPL-CCNC: 20 U/L — SIGNIFICANT CHANGE UP (ref 10–40)
BILIRUB SERPL-MCNC: 0.9 MG/DL — SIGNIFICANT CHANGE UP (ref 0.2–1.2)
BUN SERPL-MCNC: 30 MG/DL — HIGH (ref 7–23)
CALCIUM SERPL-MCNC: 10.2 MG/DL — SIGNIFICANT CHANGE UP (ref 8.4–10.5)
CHLORIDE SERPL-SCNC: 103 MMOL/L — SIGNIFICANT CHANGE UP (ref 96–108)
CO2 SERPL-SCNC: 20 MMOL/L — LOW (ref 22–31)
CREAT SERPL-MCNC: 0.75 MG/DL — SIGNIFICANT CHANGE UP (ref 0.5–1.3)
GLUCOSE BLDC GLUCOMTR-MCNC: 100 MG/DL — HIGH (ref 70–99)
GLUCOSE BLDC GLUCOMTR-MCNC: 108 MG/DL — HIGH (ref 70–99)
GLUCOSE BLDC GLUCOMTR-MCNC: 99 MG/DL — SIGNIFICANT CHANGE UP (ref 70–99)
GLUCOSE SERPL-MCNC: 106 MG/DL — HIGH (ref 70–99)
HBA1C BLD-MCNC: 5.4 % — SIGNIFICANT CHANGE UP (ref 4–5.6)
HCT VFR BLD CALC: 26.9 % — LOW (ref 34.5–45)
HGB BLD-MCNC: 9 G/DL — LOW (ref 11.5–15.5)
MAGNESIUM SERPL-MCNC: 1.7 MG/DL — SIGNIFICANT CHANGE UP (ref 1.6–2.6)
MCHC RBC-ENTMCNC: 29.8 PG — SIGNIFICANT CHANGE UP (ref 27–34)
MCHC RBC-ENTMCNC: 33.5 GM/DL — SIGNIFICANT CHANGE UP (ref 32–36)
MCV RBC AUTO: 89.1 FL — SIGNIFICANT CHANGE UP (ref 80–100)
NRBC # BLD: 0 /100 WBCS — SIGNIFICANT CHANGE UP (ref 0–0)
PLATELET # BLD AUTO: 286 K/UL — SIGNIFICANT CHANGE UP (ref 150–400)
POTASSIUM SERPL-MCNC: 3.8 MMOL/L — SIGNIFICANT CHANGE UP (ref 3.5–5.3)
POTASSIUM SERPL-SCNC: 3.8 MMOL/L — SIGNIFICANT CHANGE UP (ref 3.5–5.3)
PROT SERPL-MCNC: 7.2 G/DL — SIGNIFICANT CHANGE UP (ref 6–8.3)
RBC # BLD: 3.02 M/UL — LOW (ref 3.8–5.2)
RBC # FLD: 15.8 % — HIGH (ref 10.3–14.5)
SODIUM SERPL-SCNC: 137 MMOL/L — SIGNIFICANT CHANGE UP (ref 135–145)
WBC # BLD: 10.57 K/UL — HIGH (ref 3.8–10.5)
WBC # FLD AUTO: 10.57 K/UL — HIGH (ref 3.8–10.5)

## 2019-07-02 PROCEDURE — 85025 COMPLETE CBC W/AUTO DIFF WBC: CPT

## 2019-07-02 PROCEDURE — 85027 COMPLETE CBC AUTOMATED: CPT

## 2019-07-02 PROCEDURE — P9016: CPT

## 2019-07-02 PROCEDURE — 36573 INSJ PICC RS&I 5 YR+: CPT

## 2019-07-02 PROCEDURE — 36415 COLL VENOUS BLD VENIPUNCTURE: CPT

## 2019-07-02 PROCEDURE — 36430 TRANSFUSION BLD/BLD COMPNT: CPT

## 2019-07-02 PROCEDURE — C1751: CPT

## 2019-07-02 PROCEDURE — 99238 HOSP IP/OBS DSCHRG MGMT 30/<: CPT

## 2019-07-02 PROCEDURE — P9045: CPT

## 2019-07-02 PROCEDURE — 94640 AIRWAY INHALATION TREATMENT: CPT

## 2019-07-02 PROCEDURE — 83036 HEMOGLOBIN GLYCOSYLATED A1C: CPT

## 2019-07-02 PROCEDURE — 85610 PROTHROMBIN TIME: CPT

## 2019-07-02 PROCEDURE — 36514 APHERESIS PLASMA: CPT

## 2019-07-02 PROCEDURE — 83735 ASSAY OF MAGNESIUM: CPT

## 2019-07-02 PROCEDURE — 86901 BLOOD TYPING SEROLOGIC RH(D): CPT

## 2019-07-02 PROCEDURE — 86923 COMPATIBILITY TEST ELECTRIC: CPT

## 2019-07-02 PROCEDURE — 80053 COMPREHEN METABOLIC PANEL: CPT

## 2019-07-02 PROCEDURE — 71045 X-RAY EXAM CHEST 1 VIEW: CPT

## 2019-07-02 PROCEDURE — 86850 RBC ANTIBODY SCREEN: CPT

## 2019-07-02 PROCEDURE — 82962 GLUCOSE BLOOD TEST: CPT

## 2019-07-02 PROCEDURE — 86900 BLOOD TYPING SEROLOGIC ABO: CPT

## 2019-07-02 PROCEDURE — 85730 THROMBOPLASTIN TIME PARTIAL: CPT

## 2019-07-02 RX ORDER — POTASSIUM CHLORIDE 20 MEQ
40 PACKET (EA) ORAL ONCE
Refills: 0 | Status: COMPLETED | OUTPATIENT
Start: 2019-07-02 | End: 2019-07-02

## 2019-07-02 RX ORDER — MAGNESIUM SULFATE 500 MG/ML
2 VIAL (ML) INJECTION ONCE
Refills: 0 | Status: COMPLETED | OUTPATIENT
Start: 2019-07-02 | End: 2019-07-02

## 2019-07-02 RX ORDER — OXYCODONE AND ACETAMINOPHEN 5; 325 MG/1; MG/1
1 TABLET ORAL EVERY 6 HOURS
Refills: 0 | Status: DISCONTINUED | OUTPATIENT
Start: 2019-07-02 | End: 2019-07-02

## 2019-07-02 RX ORDER — ELECTROLYTE SOLUTION,INJ
1 VIAL (ML) INTRAVENOUS
Refills: 0 | Status: DISCONTINUED | OUTPATIENT
Start: 2019-07-02 | End: 2019-07-02

## 2019-07-02 RX ADMIN — Medication 3 MILLILITER(S): at 18:04

## 2019-07-02 RX ADMIN — FLUDROCORTISONE ACETATE 0.1 MILLIGRAM(S): 0.1 TABLET ORAL at 05:56

## 2019-07-02 RX ADMIN — Medication 3 MILLILITER(S): at 12:50

## 2019-07-02 RX ADMIN — Medication 1000 MILLIGRAM(S): at 04:19

## 2019-07-02 RX ADMIN — Medication 1000 MILLIGRAM(S): at 05:00

## 2019-07-02 RX ADMIN — Medication 50 GRAM(S): at 12:50

## 2019-07-02 RX ADMIN — Medication 1 EACH: at 17:24

## 2019-07-02 RX ADMIN — Medication 1375 MILLILITER(S): at 11:03

## 2019-07-02 RX ADMIN — Medication 200 GRAM(S): at 11:03

## 2019-07-02 RX ADMIN — Medication 1000 MILLIGRAM(S): at 15:30

## 2019-07-02 RX ADMIN — Medication 500 MILLIGRAM(S): at 12:50

## 2019-07-02 RX ADMIN — Medication 40 MILLIEQUIVALENT(S): at 12:50

## 2019-07-02 RX ADMIN — NYSTATIN CREAM 1 APPLICATION(S): 100000 CREAM TOPICAL at 05:57

## 2019-07-02 RX ADMIN — MIDODRINE HYDROCHLORIDE 5 MILLIGRAM(S): 2.5 TABLET ORAL at 05:56

## 2019-07-02 RX ADMIN — MIDODRINE HYDROCHLORIDE 5 MILLIGRAM(S): 2.5 TABLET ORAL at 14:52

## 2019-07-02 RX ADMIN — Medication 3 MILLILITER(S): at 06:09

## 2019-07-02 RX ADMIN — CHLORHEXIDINE GLUCONATE 15 MILLILITER(S): 213 SOLUTION TOPICAL at 05:56

## 2019-07-02 RX ADMIN — CHLORHEXIDINE GLUCONATE 15 MILLILITER(S): 213 SOLUTION TOPICAL at 18:04

## 2019-07-02 RX ADMIN — Medication 1000 MILLIGRAM(S): at 14:58

## 2019-07-02 RX ADMIN — ENOXAPARIN SODIUM 40 MILLIGRAM(S): 100 INJECTION SUBCUTANEOUS at 18:04

## 2019-07-02 NOTE — PROGRESS NOTE ADULT - PROBLEM SELECTOR PLAN 4
# Previous iron panel showing ACD. Previous admission s/p 1u pRBC's 5/18, 1u pRBC's 5/23, and 1u pRBCs on 6/3.  - Currently no source of bleeding or hemolysis  - Maintain active T&S.  - f/u CBC. Transfuse if Hgb <7 # Previous iron panel showing ACD. Previous admission s/p 1u pRBC's 5/18, 1u pRBC's 5/23, and 1u pRBCs on 6/3.  - Currently no source of bleeding or hemolysis  - Maintain active T&S.  - 1u PRBCs given on 7/1, Hgb up to 9.0 this AM  - f/u CBC. Transfuse if Hgb <7 # Previous iron panel showing ACD. Previous admission s/p 1u pRBC's 5/18, 1u pRBC's 5/23, and 1u pRBCs on 6/3.  - Currently no source of bleeding or hemolysis  - Maintain active T&S.  -1u PRBCs given on 7/1, Hgb up to 9.0 this AM  - f/u CBC. Transfuse if Hgb <7

## 2019-07-02 NOTE — DISCHARGE NOTE PROVIDER - HOSPITAL COURSE
Patient is a 60 yo F with PMHx polio, breast cancer, DM, Endometrial Cancer s/p exploratory laparotomy, enterolysis, SHAMIR, BSO, pelvic lymphadenectomy, low anterior resection mobilization of splenic flexure, end colostomy on 7/30/18, rectovaginal fistula, numerous admissions for urinary tract infection, and recent hospital and ICU admission for hospital course complicated by multiple episodes of sepsis and hypercapnic respiratory failure likely 2/2 AIDP (s/p IVIG and plasmapheresis), now s/p trach and PEG ( 5/21) who presented to [facility] directly from [facility] for follow-up plasmapheresis treatment.            Problem  #1:  AIDP (acute inflammatory demyelinating polyneuropathy).      Patient was admitted for a plasmapheresis treatment for this condition per Dr. García. Treatment completed during July 2nd, 2019.  Patient was stable for discharge after plasmapheresis.     Follow-up:  Pt. to follow-up with Dr. Sellers outpatient.            Problem #2: Anemia, chronic disease.      Patient with known anemia of chronic disease with hx of transfusions in the past. Patient required one transfusion of pRBCs throughout her stay.  Her hemoglobin responded appropriately and remained stable.     Follow-up:  continue to monitor hemoglobin Patient is a 58 yo F with PMHx polio, breast cancer, DM, Endometrial Cancer s/p exploratory laparotomy, enterolysis, SHAMIR, BSO, pelvic lymphadenectomy, low anterior resection mobilization of splenic flexure, end colostomy on 7/30/18, rectovaginal fistula, numerous admissions for urinary tract infection, and recent hospital and ICU admission for hospital course complicated by multiple episodes of sepsis and hypercapnic respiratory failure likely 2/2 AIDP (s/p IVIG and plasmapheresis), now s/p trach and PEG ( 5/21) who presented to [facility] directly from [facility] for follow-up plasmapheresis treatment.            1.  AIDP (acute inflammatory demyelinating polyneuropathy).      Patient was admitted for a plasmapheresis treatment for this condition per Dr. García. Treatment completed during July 2nd, 2019.  Patient was stable for discharge after plasmapheresis.     Follow-up:  Pt. to follow-up with Dr. Sellers outpatient.            2. Anemia, chronic disease.      Patient with known anemia of chronic disease with hx of transfusions in the past. Patient required one transfusion of pRBCs throughout her stay.  Her hemoglobin responded appropriately and remained stable.     Follow-up:  continue to monitor hemoglobin         3.  Left leg pain- patient endorsed one week hx of posterior left hip pain. On exam pain is located posterior to femoral head, likely 2/2 pressure point. Patient is high risk for pressure ulcer in this area.     Follow-up: frequent repositioning to prevent pressure ulcer. Can treat with Tylenol 650mg q6hrs for mild-moderate pain and 1 percocet tab q6hrs for severe pain. Can also consider topical lidocaine patch. Patient is a 58 yo F with PMHx polio, breast cancer, DM, Endometrial Cancer s/p exploratory laparotomy, enterolysis, SHAMIR, BSO, pelvic lymphadenectomy, low anterior resection mobilization of splenic flexure, end colostomy on 7/30/18, rectovaginal fistula, numerous admissions for urinary tract infection, and recent hospital and ICU admission for hospital course complicated by multiple episodes of sepsis and hypercapnic respiratory failure likely 2/2 AIDP (s/p IVIG and plasmapheresis), now s/p trach and PEG ( 5/21) who presented to [facility] directly from [facility] for follow-up plasmapheresis treatment.            1.  AIDP (acute inflammatory demyelinating polyneuropathy).      Patient was admitted for a plasmapheresis treatment for this condition per Dr. García. Treatment completed during July 2nd, 2019.  Patient was stable for discharge after plasmapheresis.     Follow-up:  Pt. to follow-up with Dr. Sellers outpatient.            2. Anemia, chronic disease.      Patient with known anemia of chronic disease with hx of transfusions in the past. Patient required one transfusion of pRBCs throughout her stay.  Her hemoglobin responded appropriately and remained stable.     Follow-up:  continue to monitor hemoglobin         3.  Left leg pain- patient endorsed one week hx of posterior left hip pain. On exam pain is located posterior to femoral head, likely 2/2 pressure point. Patient is high risk for pressure ulcer in this area.     Follow-up: frequent repositioning to prevent pressure ulcer. Can treat with Tylenol 650mg q6hrs for mild-moderate pain and 1 percocet tab q6hrs for severe pain. Can also consider topical lidocaine patch        Plan for readmission in 1 month for another treatment of PLEX. Continue vent weaning at LTAC.

## 2019-07-02 NOTE — DISCHARGE NOTE PROVIDER - NSDCCPCAREPLAN_GEN_ALL_CORE_FT
PRINCIPAL DISCHARGE DIAGNOSIS  Diagnosis: AIDP (acute inflammatory demyelinating polyneuropathy)  Assessment and Plan of Treatment: You have AIDP for which you received treatment with plasmapheresis. No complications and tolerated well. Please continue to follow-up with Dr. Sellers outpatient.      SECONDARY DISCHARGE DIAGNOSES  Diagnosis: Anemia, chronic disease  Assessment and Plan of Treatment: You have anemia which means you have low blood counts. This is due to chronic medical conditions. You recieved one blood transfusion during this hospitalization.  Your blood counts improved. Be sure to follow-up with primary care physician outpatient to monitor your blood counts.

## 2019-07-02 NOTE — PROGRESS NOTE ADULT - PROBLEM SELECTOR PLAN 7
#Hx of Stage IV endometrial adenocarcinoma;  - Per GYN-ONC notes, no anticipation of continuing chemotherapy  - GYN made aware of patient's admission. Follow up recs

## 2019-07-02 NOTE — PROGRESS NOTE ADULT - SUBJECTIVE AND OBJECTIVE BOX
Patient tolerated plasma exchange well today. She is under evaluation of her left hip pain and she will discharged to rehab with plans for readmit in one month.

## 2019-07-02 NOTE — DISCHARGE NOTE NURSING/CASE MANAGEMENT/SOCIAL WORK - NSDCDPATPORTLINK_GEN_ALL_CORE
You can access the [facility] Patient Portal, offered by [facility], by registering with the following website: [email or web address]

## 2019-07-02 NOTE — DIETITIAN INITIAL EVALUATION ADULT. - PROBLEM SELECTOR PLAN 2
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 Sellers  - C/w CPAP, wean as tolerated. AC/VC as needed for respiratory distress  - C/w frequent suctioning (q4h)  - continue duonebs

## 2019-07-02 NOTE — PROGRESS NOTE ADULT - PROBLEM SELECTOR PLAN 3
# Pt previously on levophed for autonomic dysfunction 2/2 AIDP  - Continue florinef 0.1mg qd   - Continue midodrine 10mg TID

## 2019-07-02 NOTE — DIETITIAN INITIAL EVALUATION ADULT. - PROBLEM SELECTOR PLAN 1
# AIDP / CIDP leading to respiratory compromise, previous EMG w demyelinating polyneuropathy.  - Neurology following  - s/p trach (5/21) and s/p previous rounds of IVIG and plasmapharesis  - returning and admitted for f/u plasmapharesis   - plan for plasmapheresis tomorrow. Dr. García made aware

## 2019-07-02 NOTE — DIETITIAN INITIAL EVALUATION ADULT. - ENTERAL
When feasible recommend Osmolite 1.2 @55ml/hr x24hrs to provide (1320ml TV, 1584kcal, 73g pro, 1069ml H2o) 132% RDI, 1.6g/kg pro/ ABW

## 2019-07-02 NOTE — DIETITIAN INITIAL EVALUATION ADULT. - OTHER INFO
59F with PMHx of polio c/b post-polio syndrome, breast cancer, DM, stage IV Endometrial Cancer s/p exploratory laparotomy, enterolysis, SHAMIR, BSO, pelvic lymphadenectomy, low anterior resection mobilization of splenic flexure, end colostomy on 7/30/18, rectovaginal fistula, numerous admissions for urinary tract infection, and recent hospital and ICU admission for hospital course complicated by multiple episodes of sepsis and hypercapnic respiratory failure likely 2/2 AIDP (s/p IVIG and plasmapheresis), now s/p trach and PEG ( 5/21) who presents to [facility] directly from [facility] for follow-up plasmapheresis treatment. Plasmapheresis planned for today. TF currently being held in setting of rectovaginal fistula, continues on TPN at this time + NPO, see recs below. Trach to CPAP intermittently otherwise remains on AC/VC mode as continues w apnea. Pt well known to nutrition services from prior admission (April-June) Wt noted at 53kg prior admission, - 7kg since, likely d/t fluid retention/ loses + difference in bed scales. This am pt noted w emesis/ retching, no c/d, pain noted, skin with rash noted + colostomy. Please see TPN recs below, paged team this AM re: recs, will page again this afternoon. Will follow per protocol. 59F with PMHx of polio c/b post-polio syndrome, breast cancer, DM, stage IV Endometrial Cancer s/p exploratory laparotomy, enterolysis, SHAMIR, BSO, pelvic lymphadenectomy, low anterior resection mobilization of splenic flexure, end colostomy on 7/30/18, rectovaginal fistula, numerous admissions for urinary tract infection, and recent hospital and ICU admission for hospital course complicated by multiple episodes of sepsis and hypercapnic respiratory failure likely 2/2 AIDP (s/p IVIG and plasmapheresis), now s/p trach and PEG ( 5/21) who presents to [facility] directly from [facility] for follow-up plasmapheresis treatment. Plasmapheresis planned for today. TF currently being held in setting of rectovaginal fistula, continues on TPN at this time + NPO, see recs below. Trach to CPAP intermittently otherwise remains on AC/VC mode as continues w apnea. Pt well known to nutrition services from prior admission (April-June) Wt noted at 53kg prior admission, - 7kg since, likely d/t fluid retention/ loses + difference in bed scales. This am pt noted w emesis/ retching, no c/d, pain noted, skin with rash noted + colostomy. Please see TPN recs below, paged team this AM re: recs, no response. Later received call from GYN re: TPN/ EN, wishing to wean off TPN and restart EN, recommend cutting rate in half, assessing lytes in AM and initiating trickle feeds of Osmolite 1.2 (10ml/hr), if tolerating and remains admitted continue to monitor until goal is achieved, see rec below. If DC back to facility today covering RD at facility will be able to evaluate pt and place appropriate recs re: EN. Will follow per protocol.

## 2019-07-02 NOTE — PROGRESS NOTE ADULT - PROBLEM SELECTOR PLAN 2
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 Sellers  - C/w CPAP, wean as tolerated. 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 Sellers  - C/w CPAP, wean as tolerated. Switched to SIMV this morning to prevent cuff leakage from deflation while eating.  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 Sellers  - Switched to SIMV this morning to prevent cuff leakage from deflation while eating.  - C/w frequent suctioning (q4h)  - continue emre

## 2019-07-02 NOTE — PROGRESS NOTE ADULT - PROBLEM SELECTOR PLAN 9
-IVF: none  -Monitor, Replete to K>4 and Mg>2  -Diet: continue TPN; Holding PEG feeds given enterovaginal fistula  -DVT: Lovenox, SCDs.

## 2019-07-02 NOTE — DIETITIAN INITIAL EVALUATION ADULT. - PROBLEM SELECTOR PLAN 4
# Previous iron panel showing ACD. Previous admission s/p 1u pRBC's 5/18, 1u pRBC's 5/23, and 1u pRBCs on 6/3.  - Currently no source of bleeding or hemolysis  - Maintain active T&S.  - f/u CBC. Transfuse if Hgb <7

## 2019-07-02 NOTE — DISCHARGE NOTE PROVIDER - CARE PROVIDER_API CALL
Jose Maria Sellers)  Neurology; Neuromuscular Medicine  [address], [address] [place] [address]  Phone: [phone]  Fax: [phone]  Follow Up Time:

## 2019-07-02 NOTE — PROGRESS NOTE ADULT - PROBLEM SELECTOR PLAN 8
#PEG placement  - Pt s/p PEG placement 5/21   - flushes well per nursing. GI loosened bumper. pain improved  -NPO  -TPN for now. Will need to call TPN pharmacy in the am

## 2019-07-02 NOTE — DIETITIAN INITIAL EVALUATION ADULT. - ADD RECOMMEND
1. Initiate TPN if remains admitted 2. Manage pain prn 3. Monitor and replete lytes prn 4. trend wts 1. Wean TPN per GYN, initiate EN - recommend trickle feeds of Osmolite 1.2 prior to returning to goal rate 2. Manage pain prn 3. Monitor and replete lytes prn 4. Trend wts

## 2019-07-02 NOTE — PROGRESS NOTE ADULT - SUBJECTIVE AND OBJECTIVE BOX
Interval / Overnight:  Patient seen and examined bedside. No acute events overnight. Patient is awaiting plasmaphoresis today and has no complaints.     VITAL SIGNS:  Vital Signs Last 24 Hrs  T(C): 36.9 (02 Jul 2019 05:52), Max: 36.9 (02 Jul 2019 05:52)  T(F): 98.5 (02 Jul 2019 05:52), Max: 98.5 (02 Jul 2019 05:52)  HR: 74 (02 Jul 2019 06:52) (66 - 87)  BP: 116/81 (02 Jul 2019 05:52) (100/66 - 125/80)  BP(mean): --  RR: 16 (02 Jul 2019 06:52) (12 - 23)  SpO2: 99% (02 Jul 2019 06:52) (96% - 100%)    PHYSICAL EXAM:    General: in NAD, lying comfortably in bed  HEENT: normocephalic, atraumatic; PERRL, anicteric sclera; MMM  Neck: supple, no JVD, no thyromegaly, no lymphadenopathy  Cardiovascular: +S1/S2, RRR, no M/G/R  Respiratory: clear to auscultation B/L; no wheezing, no rales, no rhonchi  Gastrointestinal: soft, NT/ND; +BSx4, no organomegaly  Extremities: WWP; no edema, clubbing or cyanosis  Vascular: 2+ radial, DP/PT pulses B/L  Neurological: AAOx3; no focal deficits    MEDICATIONS:  MEDICATIONS  (STANDING):  albumin human  5% IVPB 2750 milliLiter(s) IV Intermittent once  ALBUTerol/ipratropium for Nebulization 3 milliLiter(s) Nebulizer every 6 hours  ascorbic acid 500 milliGRAM(s) Oral daily  calcium gluconate IVPB 1 Gram(s) IV Intermittent once  chlorhexidine 0.12% Liquid 15 milliLiter(s) Oral Mucosa two times a day  dextrose 5%. 1000 milliLiter(s) (50 mL/Hr) IV Continuous <Continuous>  dextrose 50% Injectable 12.5 Gram(s) IV Push once  dextrose 50% Injectable 25 Gram(s) IV Push once  dextrose 50% Injectable 25 Gram(s) IV Push once  enoxaparin Injectable 40 milliGRAM(s) SubCutaneous every 24 hours  fludroCORTISONE 0.1 milliGRAM(s) Oral every 24 hours  Heparin 2000 units/2mL 2000 Unit(s) 2000 Unit(s) IV Push once  insulin lispro (HumaLOG) corrective regimen sliding scale   SubCutaneous Before meals and at bedtime  midodrine 5 milliGRAM(s) Oral every 8 hours  nystatin Cream 1 Application(s) Topical two times a day    MEDICATIONS  (PRN):  acetaminophen    Suspension .. 1000 milliGRAM(s) Oral every 8 hours PRN Temp greater or equal to 38C (100.4F), Moderate Pain (4 - 6)  dextrose 40% Gel 15 Gram(s) Oral once PRN Blood Glucose LESS THAN 70 milliGRAM(s)/deciliter  glucagon  Injectable 1 milliGRAM(s) IntraMuscular once PRN Glucose LESS THAN 70 milligrams/deciliter  lidocaine 2% Gel 1 Application(s) Topical two times a day PRN pain around PEG site  LORazepam     Tablet 0.5 milliGRAM(s) Oral at bedtime PRN Anxiety      ALLERGIES:  Allergies    No Known Allergies    Intolerances    IVIG PRODUCT IS PRIGIVEN OR GAMMUNEX (Unknown)  PeriGuard (Pruritus (Mild to Mod))      LABS:                        9.0    10.57 )-----------( 286      ( 02 Jul 2019 07:31 )             26.9     07-01    137  |  103  |  35<H>  ----------------------------<  112<H>  3.9   |  20<L>  |  0.63    Ca    10.0      01 Jul 2019 14:53  Mg     1.8     07-01    TPro  7.1  /  Alb  3.1<L>  /  TBili  0.4  /  DBili  x   /  AST  21  /  ALT  12  /  AlkPhos  109  07-01    PT/INR - ( 01 Jul 2019 16:42 )   PT: 12.9 sec;   INR: 1.14          PTT - ( 01 Jul 2019 16:42 )  PTT:29.3 sec    CAPILLARY BLOOD GLUCOSE      POCT Blood Glucose.: 111 mg/dL (01 Jul 2019 21:26)      RADIOLOGY & ADDITIONAL TESTS: Reviewed. Interval / Overnight:  Patient seen and examined bedside. No acute events overnight. Patient is awaiting plasmaphoresis today and has no complaints.    VITAL SIGNS:  Vital Signs Last 24 Hrs  T(C): 36.9 (02 Jul 2019 05:52), Max: 36.9 (02 Jul 2019 05:52)  T(F): 98.5 (02 Jul 2019 05:52), Max: 98.5 (02 Jul 2019 05:52)  HR: 74 (02 Jul 2019 06:52) (66 - 87)  BP: 116/81 (02 Jul 2019 05:52) (100/66 - 125/80)  BP(mean): --  RR: 16 (02 Jul 2019 06:52) (12 - 23)  SpO2: 99% (02 Jul 2019 06:52) (96% - 100%)    PHYSICAL EXAM:    General: in NAD, lying comfortably in bed  HEENT: normocephalic, atraumatic; PERRL, anicteric sclera; MMM  Neck: supple, no JVD, no thyromegaly, no lymphadenopathy  Cardiovascular: +S1/S2, RRR, no M/G/R  Respiratory: clear to auscultation B/L; no wheezing, no rales, no rhonchi  Gastrointestinal: soft, NT/ND; +BSx4, no organomegaly  Extremities: WWP; no edema, clubbing or cyanosis  Vascular: 2+ radial, DP/PT pulses B/L  Neurological: AAOx3; no focal deficits    MEDICATIONS:  MEDICATIONS  (STANDING):  albumin human  5% IVPB 2750 milliLiter(s) IV Intermittent once  ALBUTerol/ipratropium for Nebulization 3 milliLiter(s) Nebulizer every 6 hours  ascorbic acid 500 milliGRAM(s) Oral daily  calcium gluconate IVPB 1 Gram(s) IV Intermittent once  chlorhexidine 0.12% Liquid 15 milliLiter(s) Oral Mucosa two times a day  dextrose 5%. 1000 milliLiter(s) (50 mL/Hr) IV Continuous <Continuous>  dextrose 50% Injectable 12.5 Gram(s) IV Push once  dextrose 50% Injectable 25 Gram(s) IV Push once  dextrose 50% Injectable 25 Gram(s) IV Push once  enoxaparin Injectable 40 milliGRAM(s) SubCutaneous every 24 hours  fludroCORTISONE 0.1 milliGRAM(s) Oral every 24 hours  Heparin 2000 units/2mL 2000 Unit(s) 2000 Unit(s) IV Push once  insulin lispro (HumaLOG) corrective regimen sliding scale   SubCutaneous Before meals and at bedtime  midodrine 5 milliGRAM(s) Oral every 8 hours  nystatin Cream 1 Application(s) Topical two times a day    MEDICATIONS  (PRN):  acetaminophen    Suspension .. 1000 milliGRAM(s) Oral every 8 hours PRN Temp greater or equal to 38C (100.4F), Moderate Pain (4 - 6)  dextrose 40% Gel 15 Gram(s) Oral once PRN Blood Glucose LESS THAN 70 milliGRAM(s)/deciliter  glucagon  Injectable 1 milliGRAM(s) IntraMuscular once PRN Glucose LESS THAN 70 milligrams/deciliter  lidocaine 2% Gel 1 Application(s) Topical two times a day PRN pain around PEG site  LORazepam     Tablet 0.5 milliGRAM(s) Oral at bedtime PRN Anxiety      ALLERGIES:  Allergies    No Known Allergies    Intolerances    IVIG PRODUCT IS PRIGIVEN OR GAMMUNEX (Unknown)  PeriGuard (Pruritus (Mild to Mod))      LABS:                        9.0    10.57 )-----------( 286      ( 02 Jul 2019 07:31 )             26.9     07-01    137  |  103  |  35<H>  ----------------------------<  112<H>  3.9   |  20<L>  |  0.63    Ca    10.0      01 Jul 2019 14:53  Mg     1.8     07-01    TPro  7.1  /  Alb  3.1<L>  /  TBili  0.4  /  DBili  x   /  AST  21  /  ALT  12  /  AlkPhos  109  07-01    PT/INR - ( 01 Jul 2019 16:42 )   PT: 12.9 sec;   INR: 1.14          PTT - ( 01 Jul 2019 16:42 )  PTT:29.3 sec    CAPILLARY BLOOD GLUCOSE      POCT Blood Glucose.: 111 mg/dL (01 Jul 2019 21:26)      RADIOLOGY & ADDITIONAL TESTS: Reviewed. Interval / Overnight:  Patient seen and examined bedside. No acute events overnight. Patient is awaiting plasmaphoresis today and has no complaints.    VITAL SIGNS:  Vital Signs Last 24 Hrs  T(C): 36.9 (02 Jul 2019 05:52), Max: 36.9 (02 Jul 2019 05:52)  T(F): 98.5 (02 Jul 2019 05:52), Max: 98.5 (02 Jul 2019 05:52)  HR: 74 (02 Jul 2019 06:52) (66 - 87)  BP: 116/81 (02 Jul 2019 05:52) (100/66 - 125/80)  BP(mean): --  RR: 16 (02 Jul 2019 06:52) (12 - 23)  SpO2: 99% (02 Jul 2019 06:52) (96% - 100%)    PHYSICAL EXAM:    Constitutional: WDWN resting comfortably in bed; conversing in short sentences, NAD  	Head: NC/AT  	Eyes: PERRL, EOMI, anicteric sclera  	ENT: no nasal discharge; uvula midline, no oropharyngeal erythema or exudates; MMM  	Neck: supple; no JVD or thyromegaly, tracheostomy present, no erythema noted  	Respiratory: CTA B/L; no W/R/R, no retractions, poor inspiratory effort, tracheostomy connected to ventilator  	Cardiac: +S1/S2; RRR; no M/R/G; PMI non-displaced  	Gastrointestinal: soft, NT/ND; no rebound or guarding; PEG tube present, no erythema noted; Colostomy bag present, no erythema at site noted  	Genitourinary: joseph catheter present  	Extremities: no clubbing or cyanosis; no peripheral edema, cool extremities hands and feet  	Skin: Right arm PICC line, no erythema noted; left chest double lumen catheter, no erythema noted  Neurologic: AAOx3; CNII-XII grossly intact; bilateral upper extremities motor 4/5, bilateral lower extremities motor 2/5, bilateral upper/lower sensation intact    MEDICATIONS:  MEDICATIONS  (STANDING):  albumin human  5% IVPB 2750 milliLiter(s) IV Intermittent once  ALBUTerol/ipratropium for Nebulization 3 milliLiter(s) Nebulizer every 6 hours  ascorbic acid 500 milliGRAM(s) Oral daily  calcium gluconate IVPB 1 Gram(s) IV Intermittent once  chlorhexidine 0.12% Liquid 15 milliLiter(s) Oral Mucosa two times a day  dextrose 5%. 1000 milliLiter(s) (50 mL/Hr) IV Continuous <Continuous>  dextrose 50% Injectable 12.5 Gram(s) IV Push once  dextrose 50% Injectable 25 Gram(s) IV Push once  dextrose 50% Injectable 25 Gram(s) IV Push once  enoxaparin Injectable 40 milliGRAM(s) SubCutaneous every 24 hours  fludroCORTISONE 0.1 milliGRAM(s) Oral every 24 hours  Heparin 2000 units/2mL 2000 Unit(s) 2000 Unit(s) IV Push once  insulin lispro (HumaLOG) corrective regimen sliding scale   SubCutaneous Before meals and at bedtime  midodrine 5 milliGRAM(s) Oral every 8 hours  nystatin Cream 1 Application(s) Topical two times a day    MEDICATIONS  (PRN):  acetaminophen    Suspension .. 1000 milliGRAM(s) Oral every 8 hours PRN Temp greater or equal to 38C (100.4F), Moderate Pain (4 - 6)  dextrose 40% Gel 15 Gram(s) Oral once PRN Blood Glucose LESS THAN 70 milliGRAM(s)/deciliter  glucagon  Injectable 1 milliGRAM(s) IntraMuscular once PRN Glucose LESS THAN 70 milligrams/deciliter  lidocaine 2% Gel 1 Application(s) Topical two times a day PRN pain around PEG site  LORazepam     Tablet 0.5 milliGRAM(s) Oral at bedtime PRN Anxiety      ALLERGIES:  Allergies    No Known Allergies    Intolerances    IVIG PRODUCT IS PRIGIVEN OR GAMMUNEX (Unknown)  PeriGuard (Pruritus (Mild to Mod))      LABS:                        9.0    10.57 )-----------( 286      ( 02 Jul 2019 07:31 )             26.9     07-01    137  |  103  |  35<H>  ----------------------------<  112<H>  3.9   |  20<L>  |  0.63    Ca    10.0      01 Jul 2019 14:53  Mg     1.8     07-01    TPro  7.1  /  Alb  3.1<L>  /  TBili  0.4  /  DBili  x   /  AST  21  /  ALT  12  /  AlkPhos  109  07-01    PT/INR - ( 01 Jul 2019 16:42 )   PT: 12.9 sec;   INR: 1.14          PTT - ( 01 Jul 2019 16:42 )  PTT:29.3 sec    CAPILLARY BLOOD GLUCOSE      POCT Blood Glucose.: 111 mg/dL (01 Jul 2019 21:26)      RADIOLOGY & ADDITIONAL TESTS: Reviewed.

## 2019-07-02 NOTE — PROGRESS NOTE ADULT - ASSESSMENT
Patient is a 60 yo F with PMHx polio, breast cancer, DM, Endometrial Cancer s/p exploratory laparotomy, enterolysis, SHAMIR, BSO, pelvic lymphadenectomy, low anterior resection mobilization of splenic flexure, end colostomy on 7/30/18, rectovaginal fistula, numerous admissions for urinary tract infection, and recent hospital and ICU admission for hospital course complicated by multiple episodes of sepsis and hypercapnic respiratory failure likely 2/2 AIDP (s/p IVIG and plasmapheresis), now s/p trach and PEG ( 5/21) who presents to [facility] directly from [facility] for follow-up plasmapheresis treatment

## 2019-07-02 NOTE — DIETITIAN INITIAL EVALUATION ADULT. - ENERGY NEEDS
ABW used for calculations as pt between % of IBW.   ABW 46kg, IBW 47kg, 97% IBW, ht 61", BMI 19.3   Nutrient needs based on [facility] standards of care for repletion in adults, adjusted for catabolic illness and vent needs

## 2019-07-03 ENCOUNTER — INBOUND DOCUMENT (OUTPATIENT)
Age: 59
End: 2019-07-03

## 2019-07-09 DIAGNOSIS — D63.8 ANEMIA IN OTHER CHRONIC DISEASES CLASSIFIED ELSEWHERE: ICD-10-CM

## 2019-07-09 DIAGNOSIS — N82.4 OTHER FEMALE INTESTINAL-GENITAL TRACT FISTULAE: ICD-10-CM

## 2019-07-09 DIAGNOSIS — Z85.3 PERSONAL HISTORY OF MALIGNANT NEOPLASM OF BREAST: ICD-10-CM

## 2019-07-09 DIAGNOSIS — J96.92 RESPIRATORY FAILURE, UNSPECIFIED WITH HYPERCAPNIA: ICD-10-CM

## 2019-07-09 DIAGNOSIS — G61.81 CHRONIC INFLAMMATORY DEMYELINATING POLYNEURITIS: ICD-10-CM

## 2019-07-09 DIAGNOSIS — A41.9 SEPSIS, UNSPECIFIED ORGANISM: ICD-10-CM

## 2019-07-09 DIAGNOSIS — Z93.0 TRACHEOSTOMY STATUS: ICD-10-CM

## 2019-07-09 DIAGNOSIS — M25.559 PAIN IN UNSPECIFIED HIP: ICD-10-CM

## 2019-07-09 DIAGNOSIS — R33.9 RETENTION OF URINE, UNSPECIFIED: ICD-10-CM

## 2019-07-09 DIAGNOSIS — E11.9 TYPE 2 DIABETES MELLITUS WITHOUT COMPLICATIONS: ICD-10-CM

## 2019-07-09 DIAGNOSIS — G61.0 GUILLAIN-BARRE SYNDROME: ICD-10-CM

## 2019-07-10 LAB
CULTURE RESULTS: SIGNIFICANT CHANGE UP
SPECIMEN SOURCE: SIGNIFICANT CHANGE UP

## 2019-07-12 DIAGNOSIS — Z71.89 OTHER SPECIFIED COUNSELING: ICD-10-CM

## 2019-07-17 NOTE — DISCUSSION/SUMMARY
[FreeTextEntry1] : [place] catheter changed\par full blood work drawn today\par Start Desitin for skin damage at perineum\par Continue Tamoxifen/Megace\par will arrange follow up with Dr. Sellers\par port flush at next visit\par follow up in 2 months.

## 2019-07-17 NOTE — REVIEW OF SYSTEMS
[Depression] : depression [Neuropathy] : neuropathy [Fatigue] : fatigue [Diabetes] : diabetes mellitus [Incontinence] : incontinence [Muscle Weakness] : muscle weakness [FreeTextEntry2] : polio needs wheelchair since surgery in july. [de-identified] : cold intolerance  [FreeTextEntry5] : chronic anemia [FreeTextEntry7] : weight loss, fevers, weakness [de-identified] : fistula resolved [de-identified] : getting PT, knee pain

## 2019-07-17 NOTE — HISTORY OF PRESENT ILLNESS
[FreeTextEntry1] : Problem\par 1)Endometrioid adenocarcinoma stage IV\par 2)Hx triple negative breast Ca, stage 2 (2008)\par \par Previous Therapy\par 1)PET 11/8/2018\par    a)Left pelvic wall FDG avid nodule\par 2)Exploratory laparotomy abdominal radical hysterectomy bilateral salpingo-oophorectomy, omentectomy, pelvic lymph node dissection, rectosigmoid resection with end colostomy 1/30/19\par    a)Tumor invades into rectal wall, perirectal soft tissue and vaginal wall and extends into soft tissue resection margins. Overlying rectal mucosa, vaginal and cervical mucosa are free of tumor invasion. \par    b)LVSI present \par    c)Uterus with weakly proliferative endometrium and a leiomyoma, negative for tumor\par    d)Ovary with cortical stromal hyperplasia, and corpora albicantia, negative for tumor\par    e)Fallopian tubes negative for tumor\par    f)Six of nine lymph nodes are positive for metastatic carcinoma (6/9)\par    g)Omental tissue negative for tumor \par    h)Segment of colon, negative for tumor. Two lymph nodes are negative for tumor (0/2)\par    i) One lymph node is positive for metastatic adenocarcinoma (1/1/)\par 3)CT 2/18/19\par    a)5mm stone in the right distal ureter with mild hydronephrosis. mild to moderate right perinephric fat stranding which may represent pyelonephritis \par     b)Mild left hydronephrosis with no obstructing stone \par 4)MRI Pelvis 2/23/19  \par    a)S/p hysterectomy, BSO\par    b)Enterovaginal fistula seen on previous CT and not as well demonstrated on this exam, likely due to exam limitation. 2 fistula between small bowel and  vagina was clearly demonstrated on recent CT with enteric contrast. \par    c)Joseph catheter in bladder. Mild-to -moderate diffuse bladder wall thickening and enhancement with surrounding edematous change suggestive of a nonspecific cystitis. \par 4)CTAP 3/15/2019\par    a)Since 2/18/19 there has been an improvement in enterovaginal fistula. There is no longer contrast in the vagina. Small probable sinus tract remains left pelvis  \par    b)there is mild hydronephrosis bilaterally \par    c)Nee trace right pleural effusion \par \par \par 59 y.o patient with stage IV endometrioid adenocarcinoma s/p staging surgery in 2018.She has been in hospital since her surgery in July 2018 at [facility] where she was admitted for hemorrhaging and had a posterior exenteration for UPSC.  Was complicated by wound dehiscence and vac placement with long term rehab.    She then had 1 cycle chemotherapy with Carbo/ Taxol in 2/2019 with Dr. Judge.  Readmitted in February with enterovaginal fistula and was transferred to me at [facility] for higher level of care.    She was subsequently diagnosed with symptomatic rectovaginal and enterovaginal fistulas. Treated with TPN and Joseph catheter.  She also had sepsis twice and was on long term antibiotics with PICC line. She was also diagnosed with demyelinating disease and was treated with IVIGg by Tao Sellers.  She was discharged off TPN, eating regular diet to rehab center for PT.  Has had indwelling joseph due to tumor or scar on back of bladder causing incontinence.\par

## 2019-07-23 ENCOUNTER — APPOINTMENT (OUTPATIENT)
Dept: CT IMAGING | Facility: HOSPITAL | Age: 59
End: 2019-07-23

## 2019-07-26 ENCOUNTER — APPOINTMENT (OUTPATIENT)
Dept: NEUROLOGY | Facility: CLINIC | Age: 59
End: 2019-07-26

## 2019-08-02 ENCOUNTER — APPOINTMENT (OUTPATIENT)
Dept: UROLOGY | Facility: CLINIC | Age: 59
End: 2019-08-02

## 2019-12-04 NOTE — PROGRESS NOTE ADULT - PROBLEM SELECTOR PLAN 2
-now on Vancomycin and Zosyn for presumed tracheitis.  Not symptomatic Attending Attestation (For Attendings USE Only)... resolved

## 2020-03-03 NOTE — PROCEDURE NOTE - NSPERFORMEDBY_GEN_A_CORE
Patient is a 51y old  Male who presents with a chief complaint of Debility (20 Feb 2020 19:07)    ROS:   No acute events overnight.  Reports the weekend went well.   Pain controlled pain. Denies HA, dizziness, CP, SOB, abdominal pain, constipation. Moving bowels regularly. Still voids small amounts. No dysuria  Feeling well and making progress in therapy.   Son coming today for family training per patient.         Vital Signs Last 24 Hrs  T(C): 37.4 (02 Mar 2020 20:41), Max: 37.4 (02 Mar 2020 20:41)  T(F): 99.4 (02 Mar 2020 20:41), Max: 99.4 (02 Mar 2020 20:41)  HR: 62 (03 Mar 2020 05:21) (62 - 65)  BP: 110/68 (03 Mar 2020 05:21) (110/68 - 146/78)  RR: 14 (02 Mar 2020 20:41) (14 - 14)  SpO2: 96% (02 Mar 2020 20:41) (96% - 96%)          Physical Exam:   · Constitutional	detailed exam	  · Constitutional Comments	alert O x 3.	  · Respiratory	detailed exam	  · Respiratory Details	breathing comfortably on room air, no increased work of breathing. CTA 	  · Respiratory Comments	fair effort, cath site clean chest wall right, no swelling or warmth no TTP	  · Cardiovascular	detailed exam	  · Cardiovascular Details	regular rate and rhythm	  · Cardiovascular Comments	warm, well perfused	  · Gastrointestinal	detailed exam	  · GI Normal	normal; soft; nontender, dressing to right lower quadrant C/D/I 	  · Extremities	detailed exam	  · Extremities Comments	 1+ LE swelling-stable RUE 1+ swelling-stable Dressings to feet c/d/i Dressing to left axilla c/d/i 	            Recent labs/tests:     02-29    135  |  99  |  33<H>  ----------------------------<  111<H>  3.9   |  28  |  6.14<H>    Ca    8.8      29 Feb 2020 14:20  Phos  4.7     02-29    TPro  x   /  Alb  2.3<L>  /  TBili  x   /  DBili  x   /  AST  x   /  ALT  x   /  AlkPhos  x   02-29                        8.7    13.67 )-----------( 464      ( 29 Feb 2020 14:20 )             27.0       MEDICATIONS  (STANDING):  allopurinol 100 milliGRAM(s) Oral daily  apixaban 5 milliGRAM(s) Oral two times a day  AQUAPHOR (petrolatum Ointment) 1 Application(s) Topical three times a day  brimonidine 0.2% Ophthalmic Solution 1 Drop(s) Both EYES three times a day  chlorhexidine 4% Liquid 1 Application(s) Topical daily  epoetin trey Injectable 38785 Unit(s) IV Push <User Schedule>  latanoprost 0.005% Ophthalmic Solution 1 Drop(s) Both EYES daily  lidocaine 2% Gel 1 Application(s) Topical two times a day  metoprolol tartrate 25 milliGRAM(s) Oral every 12 hours  Nephro-candace 1 Tablet(s) Oral daily  nystatin    Suspension 466179 Unit(s) Oral four times a day  polyethylene glycol 3350 17 Gram(s) Oral daily  saccharomyces boulardii 250 milliGRAM(s) Oral two times a day  senna 2 Tablet(s) Oral at bedtime    MEDICATIONS  (PRN):  acetaminophen   Tablet .. 975 milliGRAM(s) Oral every 6 hours PRN Mild Pain (1 - 3)  benzocaine 15 mG/menthol 3.6 mG (Sugar-Free) Lozenge 1 Lozenge Oral three times a day PRN Sore Throat  bisacodyl Suppository 10 milliGRAM(s) Rectal daily PRN Constipation  hydrocortisone 2.5% Lotion 1 Application(s) Topical every 12 hours PRN Rash and/or Itching  lactulose Syrup 10 Gram(s) Oral daily PRN constipation  oxyCODONE    IR 5 milliGRAM(s) Oral every 4 hours PRN Moderate Pain (4 - 6)  oxyCODONE    IR 10 milliGRAM(s) Oral every 4 hours PRN Severe Pain (7 - 10)  simethicone 80 milliGRAM(s) Chew three times a day PRN Gas Patient is a 51y old  Male who presents with a chief complaint of Debility (20 Feb 2020 19:07)    ROS:   No acute events overnight.  Pain controlled pain. Denies HA, dizziness, CP, SOB, abdominal pain, constipation. Moving bowels regularly. Feeling well and ready for discharge today.     Vital Signs Last 24 Hrs  T(C): 36.8 (03 Mar 2020 09:35), Max: 37.4 (02 Mar 2020 20:41)  T(F): 98.2 (03 Mar 2020 09:35), Max: 99.4 (02 Mar 2020 20:41)  HR: 64 (03 Mar 2020 09:35) (62 - 65)  BP: 129/70 (03 Mar 2020 09:35) (110/68 - 146/78)  RR: 16 (03 Mar 2020 09:35) (14 - 16)  SpO2: 100% (03 Mar 2020 09:35) (96% - 100%)      Physical Exam:   · Constitutional	detailed exam	  · Constitutional Comments	alert O x 3.	  · Respiratory	detailed exam	  · Respiratory Details	breathing comfortably on room air, no increased work of breathing. CTA 	  · Respiratory Comments	fair effort, cath site clean chest wall right, no swelling or warmth no TTP	  · Cardiovascular	detailed exam	  · Cardiovascular Details	regular rate and rhythm	  · Cardiovascular Comments	warm, well perfused	  · Gastrointestinal	detailed exam	  · GI Normal	normal; soft; nontender, dressing to right lower quadrant C/D/I 	  · Extremities	detailed exam	  · Extremities Comments	 1+ LE swelling-stable RUE 1+ swelling-stable Dressings to feet c/d/i Dressing to left axilla c/d/i 	            Recent labs/tests:     02-29    135  |  99  |  33<H>  ----------------------------<  111<H>  3.9   |  28  |  6.14<H>    Ca    8.8      29 Feb 2020 14:20  Phos  4.7     02-29    TPro  x   /  Alb  2.3<L>  /  TBili  x   /  DBili  x   /  AST  x   /  ALT  x   /  AlkPhos  x   02-29                        8.7    13.67 )-----------( 464      ( 29 Feb 2020 14:20 )             27.0       MEDICATIONS  (STANDING):  allopurinol 100 milliGRAM(s) Oral daily  apixaban 5 milliGRAM(s) Oral two times a day  AQUAPHOR (petrolatum Ointment) 1 Application(s) Topical three times a day  brimonidine 0.2% Ophthalmic Solution 1 Drop(s) Both EYES three times a day  chlorhexidine 4% Liquid 1 Application(s) Topical daily  epoetin trey Injectable 66809 Unit(s) IV Push <User Schedule>  latanoprost 0.005% Ophthalmic Solution 1 Drop(s) Both EYES daily  lidocaine 2% Gel 1 Application(s) Topical two times a day  metoprolol tartrate 25 milliGRAM(s) Oral every 12 hours  Nephro-candace 1 Tablet(s) Oral daily  nystatin    Suspension 280249 Unit(s) Oral four times a day  polyethylene glycol 3350 17 Gram(s) Oral daily  saccharomyces boulardii 250 milliGRAM(s) Oral two times a day  senna 2 Tablet(s) Oral at bedtime    MEDICATIONS  (PRN):  acetaminophen   Tablet .. 975 milliGRAM(s) Oral every 6 hours PRN Mild Pain (1 - 3)  benzocaine 15 mG/menthol 3.6 mG (Sugar-Free) Lozenge 1 Lozenge Oral three times a day PRN Sore Throat  bisacodyl Suppository 10 milliGRAM(s) Rectal daily PRN Constipation  hydrocortisone 2.5% Lotion 1 Application(s) Topical every 12 hours PRN Rash and/or Itching  lactulose Syrup 10 Gram(s) Oral daily PRN constipation  oxyCODONE    IR 5 milliGRAM(s) Oral every 4 hours PRN Moderate Pain (4 - 6)  oxyCODONE    IR 10 milliGRAM(s) Oral every 4 hours PRN Severe Pain (7 - 10)  simethicone 80 milliGRAM(s) Chew three times a day PRN Gas Fellow/Myself/Dr Kina Garcia

## 2020-06-15 NOTE — PATIENT PROFILE ADULT - NSPROPOAPRESSUREINJURY_GEN_A_NUR
Per patient daughter she has not been taking her Metformin due to this causing her stomach to hurt, she is also c/o constipation OTC medication not helping please review and advise. Patient has also been seen by Neurology see previous message. no

## 2020-07-14 NOTE — DISCHARGE NOTE NURSING/CASE MANAGEMENT/SOCIAL WORK - NSDCVIVACCINE_GEN_ALL_CORE_FT
[FreeTextEntry1] : 1. hashimoto's hypothyroid - severe hypothyroidism after running out of meds 6 weeks ago\par - restart LT4 112 mcg daily (can take 2 tabs daily for the next 2-3 days)\par - repeat levels monthly until f/u visit in 3 months\par - risks of hypothyroidism discussed, discussed importance of daily LT4 admin, advised pt to call office for samples if needed\par \par 2. stable thyroid nodule - repeat thyroid sono summer 2021
No Vaccines Administered.

## 2020-09-16 NOTE — H&P ADULT - PROBLEM SELECTOR PROBLEM 4

## 2021-02-17 NOTE — PATIENT PROFILE ADULT - NSTRANSFERBELONGINGSDISPO_GEN_A_NUR
Physical Therapy Daily Progress Note Entered On:  1/30/2019 13:27     Performed On:  1/30/2019 9:55  by Orin Fuentes   PT Exercise Each 15 Min :   1    PT Therapeutic Activities Each 15 Min :   1    Orin Fuentes - 1/30/2019 13:19    Musculoskeletal Assessment   Comments and Special Tests :   Patient performed AROM R LE ankle pumps, hip/knee flexion, hip abd/add, L LE AAROM hip/knee flexion and hip abd/add, PROM ankle with gentle gastroc stretch x 10 reps each   Orin Fuentes 1/30/2019 13:19    Wheelchair Mobility   Supine to Sitting :   Min assist   (Comment: L LE with HOB elevated with use of rail through L side of bed [Orin Fuentes 1/30/2019 13:28 ] )   Sitting to Supine :   Mod assist   (Comment: B LE with HOB partially elevated from L side of bed [Orin Fuentes - 1/30/2019 13:28 ] )   Sitting to Standing :   Max assist   (Comment: from recliner to L platform walker, min A from edge of elevated bed to L platform RW [Orin Fuentes - 1/30/2019 13:28 ] )   Standing to Sitting :   Min assist   Orin Fuentes 1/30/2019 13:19    Gait Grid   Assistive Device :    Rolling walker - standard  (Comment: with L platform RW [Orin Fuentes - 1/30/2019 13:28 ] )             Distance :    3 ft              Level of Assistance :    Minimal assistance - one person assist  (Comment: with steps to the L, assist to move L LE laterally/as needed, patient was able to move L LE when transferring to her R [Orin Fuentes - 1/30/2019 13:28 ] )             Comment  (Comment: 2 trials [Orin Fuentes - 1/30/2019 13:28 ] )         Orin Fuentes 1/30/2019 13:19          Gait Analysis :   Patient with forward flexed posture although improved from yesterday, decreased clearance with swing with L LE, antalgic pattern, increased sway - 2nd person present for safety   Endurance/Activity Level :   Fair   Orin Fuentes - 1/30/2019 13:19    Education   Preferred Communication Mode :   Verbal   Language for Written Material :    English   Orin Fuentes - 1/30/2019 13:19    Assessment and Goals   Assessment :   Patient received from NINO Elizalde in bed on 15L high flow O2 via NC.  Patient agreeable to participate and reported abdomen feeling a little tight/pain at 3/10.  Patient performed B LE exercises, 2nd person arrived for safety, performed bed mobility, transfer and gait to recliner- therapist returned almost 2 hours later, patient still in recliner - reported she went to commode with nursing and is now ready to return to bed.  2nd person arrived for safety, patient transferred back to bed with all needs in reach.  Patient requiring the most assist to stand from recliner and to move L LE with gait.  Will continue daily x 5    Discharge daily skilled therapy    Progressive mobility - chair 2 times per day/dangle edge of bed, 2 assist with mobility with L platform RW, assist as needed to move L LE and min A    Total time 25 minutes, 10 ex, 15 TA  2nd session billed separately 13 min, 13 TA     Orin Fuentes - 1/30/2019 13:28    Follow-Up PT Recommendations :   To be determined   Orin Fuentes - 1/30/2019 13:19    Additional PT Recommendations :   daily skilled therapy     Orin Fuentes - 1/30/2019 13:28    Orin Fuentes - 1/30/2019 13:28    PT Goals Grid   Problem :    bed mobility   transfers   gait        Patient will... :    transition sup<>sit with min A to get into/out of bed   demo sit<>stand and transfer with L platform walker and mod A to promote OOB activities   amb 5ft with L platform walker and CGA to promote amb        Timeframe :    3-4 days   3-4 days   3-4 days          Orin Fuentes - 1/30/2019 13:19   Orin Fuentes - 1/30/2019 13:19   Orin Fuentes - 1/30/2019 13:19        PT Plan/Recommendations :   Therapeutic exercise, Bed mobility, Transfer training, Gait training, Endurance training, Education of patient/family   Frequency :   Daily x 5 days per week   Location of PT Intervention :   Bedside   PT Care Plan Discussed With :    Patient   Alfredo Orin - 1/30/2019 13:19            with patient

## 2021-03-09 NOTE — PROGRESS NOTE ADULT - PROBLEM SELECTOR PLAN 5
Continue all medications. Resolved. hx of recurrent ESBL ecoli UTI and bacteremia. was initially admitted for septic shock 2/2 to ESBL ecoli bacteremia and UTI  - Completed tx w/ ertapenem on 5/14

## 2021-03-15 NOTE — PROCEDURE NOTE - NSPOSTCAREGUIDE_GEN_A_CORE
15-Mar-2021
Verbal/written post procedure instructions were given to patient/caregiver/Instructed patient/caregiver regarding signs and symptoms of infection
Instructed patient/caregiver regarding signs and symptoms of infection/Verbal/written post procedure instructions were given to patient/caregiver/Care for catheter as per unit/ICU protocols

## 2021-07-15 NOTE — PROGRESS NOTE ADULT - PROBLEM SELECTOR PLAN 9
61yMale patient s/p en bloc sacrectomy for chordoma w/ L4-pelvis fusion w/ plastics closure vertical rectus abdominis myocutaneous (VRAM) flap for chordoma on 07/07 and 07/08, post-op course c/b bilateral PEs, now on hep gtt. Surgery reconsulted for concern for post-op ileus. Patient seen and examined yesterday. Abdominal xray ordered which showed improvement from previous abd xray.    Xray Abdomen 1 View PORTABLE             PROCEDURE DATE:  07/14/2021    INDICATION: Rule out ileus.  IMPRESSION:  Nonspecific gas pattern. Multiple loops of small bowel appear to be distended however air is seen in the colon. Changes compatible with ileus and/or partial small bowel obstruction. NG tube is in the stomach. Orthopedic hardware is seen in the lumbar spine.    Patient reports passing flatus and abdominal exam remains benign. Diet advanced and patient tolerating. No surgical intervention at this time. Surgery to sign off.  Please reconsult or page [phone] for any further questions.  Care per primary    Red Surgery -IVF: none  -Monitor, Replete to K>4 and Mg>2  -Diet: continue TPN given enterovaginal fistula  -DVT: Lovenox, SCDs

## 2022-01-24 NOTE — DIETITIAN INITIAL EVALUATION ADULT. - PROBLEM SELECTOR PLAN 3
English
# Pt previously on levophed for autonomic dysfunction 2/2 AIDP  - Continue florinef 0.1mg qd   - Continue midodrine 10mg TID

## 2022-03-23 NOTE — ED ADULT TRIAGE NOTE - NS ED TRIAGE AVPU SCALE
Alert-The patient is alert, awake and responds to voice. The patient is oriented to time, place, and person. The triage nurse is able to obtain subjective information. (1) Other Diagnosis

## 2022-03-27 NOTE — DIETITIAN INITIAL EVALUATION ADULT. - ETIOLOGY
-- DO NOT REPLY / DO NOT REPLY ALL --  -- Message is from the [facility]--    Order Request  Lung scan     Message / reason: patient is coughing and would like a scan of lungs, not eating, she feels bronchitis, not feeling well, cough occasionally, sneezing.     Insurance type: Medicare   Payor: MEDICARE / Plan: PARTA AND B / Product Type: MEDICARE    Preferred Delivery Method   Call when ready for pickup - phone number to notify: [phone]     Caller Information       Type Contact Phone    03/27/2022 05:58 PM CDT Phone (Incoming) Tayler Lees (Self) [phone] (M)          Alternative phone number: none    Turnaround time given to caller:   \"This message will be sent to [state Provider's name]. The clinical team will fulfill your request as soon as they review your message when the office opens on Monday (or after the holiday).\"   RT current NPO status

## 2022-05-11 NOTE — PROGRESS NOTE ADULT - PROVIDER SPECIALTY LIST ADULT
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  He also had a liver biopsy, consistent with iron overload.  He has been getting intermittent phlebotomies.  He has not had phlebotomy now in several months.  His most recent ferritin was from 08/16/2016; it demonstrated a ferritin of 58.  He now presents for followup evaluation.  He states he is doing quite well.  He has no complaints.\par \par HCM\par Next Colonoscopy due in Nov 2022 [de-identified] : 1/23/17\par He presents for follow up. His ferritin in Oct was 101 and he underwent a phlebotomy of 1 unit. HIs  AFP was 7 and ultrasound of the liver was normal in November. He comes in for follow up. Hs no complaints.\par \par 4/19/17\par He presents for follow up. Last ferritin  was under 100.  He has no complaints,\par \par 7/18/17\par No events. Denies weight loss, fevers. No new meds. Last AFP an ferritin were WNL\par \par 10/31/17\par He is doing well. He had a phlebotomyin September and October. No complaints\par \par 1/23/18\par He is here for follow up for hemochromatisis. He has no complaints. He had  CT abd/Pelvis this month that was normal. His Ferretin from this month is in the 60s.\par \par 04/24/2018\par Patient is here for scheduled follow up visit. He denies any complaints. His last ferritin(03/2018) was 81.\par \par 8/14/18\par He is here for follow up.  HIs ferritin from 8/10 was 98. He has no complaints. Last AFP 2.5 in April\par \par 2/12/19\par He is here for follow up.  He had US in 8/2018 by Dr. Aviles IMPRESSION: 1.  Bilateral renal cysts.  2.  Pancreas is mostly obscured by overlying bowel gas and cannot be  assessed.  3.  Otherwise, unremarkable abdominal ultrasound.\par Last Ferritin was 90 in 12/2018.\par \par He is doing well has no complaints. \par \par 8/13/19\par He is here for follow up he had a colonoscopy by Dr. Aviles: 8/3/19  Polyp (5 mm) in the transverse colon. (Polypectomy). \par  Polyp (1 cm) in the transverse colon. (Polypectomy). \par  Polyp (5 mm) in the hepatic flexure. (Polypectomy). \par  Otherwise normal colon. \par Plan: Colonoscopy in 3 years. \par Path was negative. Last ferritin in may was 94.\par \par He has no complaints\par \par 2/10/2020\par Patient is here for a follow-up visit for hereditary hemochromatosis.  He is feeling well with no new complaints.  Most recent CBC is stable.  Ferritin from 1.17.2020 down to 109.  He was phlebotomized that same day.    \par US Abd (12.6.2019) IMPRESSION:Unremarkable right upper quadrant ultrasound.\par \par 5/11/2020\par He is doing well he has no compalitns. \par \par \par 9/21/20\par He is here for follow up. His ferritin was at goal earlier this month. He has no complaits.\par \par \par 3/15/21\par He is here for follow up feels well. He had phlebotomy in Januray 2021. Ferritin was 166 in December 2020\par \par \par 11/15/21\par He is here for follow up. Since last visit his ferritin was as high as 133 in 9/21 and he was restarted on Phlebotomy  with last on 10/18/21 and ferritin was 95. CNC f From today showed normal CBC. Last CMP was  normal. He has no complaints today.\par \par 5/11/22\par Patient is here for a follow-up visit for hereditary hemochromatosis.  He is feeling well with no new complaints.  Reviewed most recent CBC from 04/2022, which is stable.  Patient denies fever, chills, nausea, vomiting, dyspnea, pruritus, jaundice or bleeding.  Last ferritin was down to 64ng/mL from 02/2022.  No new medications.  He is reportedly up to date with age appropriate screenings including colonoscopy.

## 2022-07-30 NOTE — DISCHARGE NOTE PROVIDER - DISCHARGE DATE
02-Jul-2019 Mom need physical form filed out for  for patient. ...she ask to send form thru my chart

## 2022-08-03 NOTE — DISCHARGE NOTE NURSING/CASE MANAGEMENT/SOCIAL WORK - CAREGIVER ADDRESS
Michael James MD Guevara, Jeane Kathleen P, RN  If possible please schedule a PET scan prior to her next visit with a diagnosis ofNon-Hodgkin's lymphoma             Previous Messages       ----- Message -----   From: Jacqui Mendoza, RN   Sent: 8/3/2022  12:17 PM EDT   To: MD Dr Jacob Lainez,     Patient is to see you on 8/10, she had CT scan 7/8 and US guided lymph node biopsy on 7/22.     Tissue Pathology: Final Diagnosis   1-2. Lymph Node, Right Axilla, Core Biopsies for Adenopathy ([id number]): SMALL LYMPHOCYTIC LYMPHOMA (SLL/CLL).     Any orders/tests prior to her follow up?     (Patient called today to ensure you saw her scans and biopsy reports)     Thank you very much!      Patient made aware of MD recommendation, patient v/u, coordinating with  for the pet scan to be scheduled and coordinate with patient.   20-[address] [place] [id number]

## 2022-12-15 NOTE — PROGRESS NOTE ADULT - ASSESSMENT
58 yo F PMHx polio, breast cancer, DM, Endometrial Cancer s/p exploratory laparotomy, enterolysis, SHAMIR, BSO, pelvic lymphadenectomy, low anterior resection mobilization of splenic flexure, end colostomy on 7/30/18, rectovaginal fistula, numerous admissions for urinary tract infection presented to [facility] in the setting of urosepsis. Hospital course complicated by ESBL E coli bacteremia (completed ertapenem on 5/14 ) and respiratory failure requiring multiple intubations 2/2 AIDP, now s/p trach and PEG (5/21), s/p treatment for sepsis 2/2 MRSA PNA w/ linezolid. Now on vanco/zosyn for tracheitis.  Now s/p 2nd round of plasmapheresis. Now with re-opening of enteric-vaginal fistula, on strict NPO requiring TPN. Now stable for LITO pending bed acceptance. DVT prophylaxis DVT prophylaxis DVT prophylaxis DVT prophylaxis DVT prophylaxis DVT prophylaxis DVT prophylaxis DVT prophylaxis DVT prophylaxis

## 2023-01-03 NOTE — PROGRESS NOTE ADULT - PROBLEM SELECTOR PROBLEM 3
Acute inflammatory demyelinating polyneuropathy I was present for and supervised the key critical aspects of the procedures performed during the care of the patient.

## 2023-01-20 NOTE — PROGRESS NOTE ADULT - PROBLEM SELECTOR PROBLEM 8
ASSESSMENT/PLAN:      ICD-10-CM    1. Acute chest pain  R07.9 EKG 12-lead, tracing only      2. Chest wall tenderness  R07.89     lower left chest just lateral to base of sternum, not the same pain as constant throbbing pain in chest, pain in area of with deep inspiration, no hx of injury      3. Type 2 diabetes mellitus with hyperglycemia, without long-term current use of insulin (H)  E11.65     poorly controlled, a1c 12.4 11/2022       4. Hyperlipidemia LDL goal <70  E78.5     on statin       5. Chronic hepatitis C  (H)  B18.2       6. Midepigastric pain  R10.13     noted on abdominal exam, patient denies mid epigastric pain, hx of midepigastric pain,  stomach pain, n/v/d, no melena/no brbpr       7. Adjustment disorder with depressed mood  F43.21     on remeron         43-year-old male with poorly controlled diabetes A1c 12.4 11/20/2022, hyperlipidemia with sudden onset of shortness of breath and left-sided chest pain  awakened him from sleep. late last p.m/early this a.m..  Initially was a 10 out of 10 and has progressively improved to now 3-5 out of 10 pain is constant throbbing does not radiate, point tender left chest wall t just lateral to the base of the sternum , does not reproduce the pain that he is having in his chest.  No URI symptoms, fever or chills did have some mild sweating with the onset of shortness of breath.  EKG in clinic was normal.  Signs are stable.  Due to persistent left-sided chest pain and multiple cardiac risk factors, patient will go to the ER across the street at [facility] via private vehicle for further evaluation .  Dr. Joyce is aware and accept the patient in transfer.         Reviewed medication instructions and side effects. Follow up if experiences side effects.     I reviewed supportive care, otc meds to use if needed, expected course, and signs of concern.  Follow up as needed or if he does not improve within  1-2 days or if worsens in any way.  Reviewed red flag  symptoms and is to go to the ER if experiences any of these.     The use of Dragon[employer] dictation services may have been used to construct the content in this note; any grammatical or spelling errors are non-intentional. Please contact the author of this note directly if you are in need of any clarification.      On the day of the encounter, time spend on chart review, patient visit, review of testing, documentation was 45  minutes      Due to language barrier, an  was present during the history-taking and subsequent discussion and the physical exam with this patient.      Patient Instructions     To [facility] ER for further evaluation of your chest pain, sudden onset of shortness of breath last night.  Could be your heart due to your diabetes-A1C 12.4  and elevated cholesterol                 Patient presents with:  Chest Pain: X yedsterday. Pain rate 10/10. Today 5/10. No SOB or lightheaded.        Subjective     Hsa ARLEY Russo is a 43 year old male who presents to clinic today for the following health issues:    HPI       Chest Pain      Onset: late pm/early am last night   Patient 43-year-old male with poorly controlled diabetes last A1c 12.4, hyperlipidemia chronic hepatitis C with sudden onset of shortness of breath that awakened him from sleep with 210/10 chest pain on the left side of his chest   patient took some Tylenol tried to drink some warm water water   pain did not resolve he could not get back to sleep   by early this a.m. the pain slowly had subsided to about a 5 / 10-continue to be a constant throbbing pain pain in the left side of his chest, did not radiate into his neck or arm  He points to a small area of tenderness in his left chest close to the base of his sternum that hurts when he presses the area but not the same as a constant throbbing pain in his chest, no longer having shortness of breath however he does have intermittent episodes of shortness of breath when he walks, he did  have an episode of sweating at the time of onset of shortness of breath during the night  Denies palpitations nausea vomiting, no cough. 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 any injury to the chest or any type of heavy lifting earlier in the day prior to onset of shortness of breath and chest pain         NO hx  of smoking in the present or past including vaping and marijuana  ETOH-1 drink 1 or 2 beers about every 3 to 4 weeks  Family history-denies any family history of diabetes, heart disease     Patient has a history of chronic hepatitis C no history of being evaluated or followed by GI  No history of similar in the past          Past Medical History:   Diagnosis Date     Immune to hepatitis B 2/12/2020 2/11/2020 - Immune from prior infection     Immune to hepatitis B 2/12/2020 2/11/2020 - Immune from prior infection     Social History     Tobacco Use     Smoking status: Never     Smokeless tobacco: Never   Substance Use Topics     Alcohol use: Yes       Current Outpatient Medications   Medication Sig Dispense Refill     acetaminophen (TYLENOL) 500 MG tablet Take 2 tablets (1,000 mg) by mouth 3 times daily as needed for pain 100 tablet 3     alcohol swab prep pads Use to swab area of injection/everton as directed. 100 each 3     atorvastatin (LIPITOR) 20 MG tablet Take 1 tablet (20 mg) by mouth daily 90 tablet 3     blood glucose (NO BRAND SPECIFIED) test strip Use to test blood sugar 1 times daily or as directed. To accompany: Blood Glucose Monitor Brands: per insurance. 100 strip 3     blood glucose calibration (NO BRAND SPECIFIED) solution To accompany: Blood Glucose Monitor Brands: per insurance. 1 each 3     blood glucose monitoring (NO BRAND SPECIFIED) meter device kit Use to test blood sugar 1 times daily or as directed. Preferred blood glucose meter OR supplies to accompany: Blood Glucose Monitor Brands: per insurance. 1 kit 0     Continuous Blood Gluc Sensor (FREESTYLE NGHIA 2  SENSOR) MISC 1 each every 14 days Use 1 sensor every 14 days. Use to read blood sugars per 's instructions. 2 each 5     glipiZIDE (GLUCOTROL XL) 5 MG 24 hr tablet Take 1 tablet (5 mg) by mouth daily 30 tablet 3     metFORMIN (GLUCOPHAGE XR) 500 MG 24 hr tablet Take 1 tablet (500 mg) by mouth 2 times daily (with meals) 180 tablet 3     mirtazapine (REMERON) 7.5 MG tablet Take 1 tablet (7.5 mg) by mouth At Bedtime 90 tablet 3     thin (NO BRAND SPECIFIED) lancets Use with lanceting device. To accompany: Blood Glucose Monitor Brands: per insurance. 100 each 3     No Known Allergies          ROS are negative, except as otherwise noted HPI      Objective    /83 (BP Location: Right arm, Patient Position: Sitting, Cuff Size: Adult Regular)   Pulse 76   Temp 98.5  F (36.9  C) (Oral)   Resp 16   Wt 55.6 kg (122 lb 9.6 oz)   SpO2 99%   BMI 22.42 kg/m    Body mass index is 22.42 kg/m .  Physical Exam   GENERAL:  alert and moderate distress  HENT: ear canals and TM's normal, nose and mouth without ulcers or lesions  NECK: no adenopathy, no asymmetry, masses, or scars and thyroid normal to palpation  RESP: lungs clear to auscultation - no rales, rhonchi or wheezes  CHEST-point tender to palpation lower left chest just lateral to base of sternum, not the same as throbbing pain in his chest, does not make to pain worse   CV: regular rate and rhythm, no murmur, click or rub,   ABDOMEN: soft, nontender, no hepatosplenomegaly, no masses and bowel sounds normal  MS: no gross musculoskeletal defects noted, no edema  NEURO: Normal strength and tone, mentation intact and speech normal, normal gait       Diagnostic Test Results:  Labs reviewed in Epic  Results for orders placed or performed during the hospital encounter of 01/20/23   CBC with platelets differential     Status: None ()    Narrative    The following orders were created for panel order CBC with platelets differential.  Procedure                                Abnormality         Status                     ---------                               -----------         ------                     CBC with platelets and d...[[id number]]                                                   Please view results for these tests on the individual orders.   Results for orders placed or performed in visit on 01/20/23   EKG 12-lead, tracing only     Status: None (Preliminary result)   Result Value Ref Range    Systolic Blood Pressure  mmHg    Diastolic Blood Pressure  mmHg    Ventricular Rate 78 BPM    Atrial Rate 78 BPM    KY Interval 150 ms    QRS Duration 90 ms     ms    QTc 405 ms    P Axis 59 degrees    R AXIS -6 degrees    T Axis 33 degrees    Interpretation ECG       Sinus rhythm  Normal ECG  No previous ECGs available                          Urinary retention

## 2023-03-03 NOTE — PROGRESS NOTE ADULT - ASSESSMENT
60yo F HD8 with stage IV endometrial adenocarcinoma s/p staging surgery '18 and 1 round of chemotherapy 2/19 readmitted from [facility] with fever previously admitted for management of symptomatic rectovaginal and enterovaginal fistulas 2/22-3/25 now bacteremic. Last fever at 0855am 4/1.       1. 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 will speak with Dr. Sellers today regarding plan  2. Pulm - cough and SOB improved,  saturating 93-95% on RA overnight; noted to have nodule in lung on CT, will f/u outpatient  Pulmonology Consulted- finding could be pneumonia vs atelectasis, currently on appropriate antibiotic treatment and improving and no pulm intervention needed at this time; Lower extremity dopplers negative  3. Cardio- Echo: within normal limits   4. FEN/GI - Reg diet. s/p TPN. PICC line removed - PICC tip cx grew MRSA  - elevated liver enzymes improved now increasing- GI consulted appreciate recs, RUQ sono- gallbladder wall thickening and sludge- no gallstones and hepatic vein patent   - No current active leaking of stool  - replete electrolytes as needed   - monitor strict I/O and nutritional intake w/ calorie counts   - nutrition consulted will follow calorie counts in order to make suggestions, at this time recommend 1400calories per day, 66g protein, Glucerna shakes TID   5.  - [place]rook placed given urinary retention 3/16  - Perianal irritation consistent with possible fungal infection- continue nystatin cream 3/23; apply to affected area as needed, improving w/ less irritation   - CT abdomen/pelvis w/ IV and oral contrast performed 3/15 showed improved enterovaginal fistula, no contrast in vagina, repeat CT on 4/5 showed no contrast in vagina again   6. ID: Bacteremia- Blood culture 4/1 positive for gram neg rods, coag neg staph, proteus. Blood culture from PICC 4/3 positive for Proteus ESBL  PICC tip cx significant for MRSA - initiated IV vancomycin 1g q12h  Blood cultures collected   ID following- IV 1g qd Ertapenem 4/4, now vancomycin 1g q12h.     Previously s/p Vancomycin; s/p Zosyn (4/1-4/3), and Meropenem (4/3-4/4) *will stop vanc after AM dose most likely speak with ID  7. Endocrine- ISS, has not taken home metformin in >1 month, monitor FS; endocrine consulted last hospital stay for workup of possible DI which was negative per team   8. VTE prophylaxis - SCDs, ambulate as tolerated, Lovenox 40 daily, PT following    9. GYN malignancy  - continue anastrozole for treatment at this time --> switch patient to Faslodex IM *call pharmacy and heme/onc in AM  - given CT shows ne pelvic soft tissue nodules/lymph nodes consistent with metastasis, discussed with pharmacy obtaining Faslodex to start on patient, to fill out chemotherapy paperwork today and discuss with pharmacy ability to get Faslodex for patient   10. Derm: monitor sacrum for s/s of pressure ulcer. encourage frequent changes in position and OOB during  the day. Dupixent Pregnancy And Lactation Text: This medication likely crosses the placenta but the risk for the fetus is uncertain. This medication is excreted in breast milk.

## 2023-05-15 NOTE — PROGRESS NOTE ADULT - PROBLEM SELECTOR PLAN 8
Patient will require a multidisciplinary meeting in the coming days between GYN-ONC, primary team, neurology, our team, and patient's family to help further discuss near to long term goals of care. Particularly with respect to resumption of chemotherapy, and even whether not Ms. Baker would want to be re-intubated should her respiratory status become compromised again.   - will f/u with above teams when appropriate time to discuss ongoing goals of care for her debility, and malignancy; otherwise continue current acute management for reversible causes i.e. w/ problem #2 above Continues to have significant setbacks during her hospitalization. She was admittedly better on Friday with respect to her AIDP and was extubated; even in better spirits psychologically. 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 Prior meeting with pt's sister Esha was with GYN team regarding planned CTX in setting of the (then improving) AIDP. Now, will need to have multi-team conversation with Esha with respect to patient's overall condition -- particularly with the near-term anticipation of trach requirement for the respiratory failure. Per primary team, Esha is on her way to hospital and we will attempt to speak to her regarding these new developments. Erythromycin Counseling:  I discussed with the patient the risks of erythromycin including but not limited to GI upset, allergic reaction, drug rash, diarrhea, increase in liver enzymes, and yeast infections.

## 2023-07-28 NOTE — PROGRESS NOTE BEHAVIORAL HEALTH - ATTENTION / CONCENTRATION
----- Message from Ronal Coello MD sent at 7/28/2023  9:47 AM CDT -----  Results reviewed    
Normal
Normal

## 2023-07-31 NOTE — PATIENT PROFILE ADULT - DO YOU FEEL THREATENED BY OTHERS?
-- DO NOT REPLY / DO NOT REPLY ALL --  -- Message is from Engagement Center Operations (ECO) --    General Patient Message:  Patient is calling regarding results from lab work.    Caller Information       Type Contact Phone/Fax    07/31/2023 01:59 PM CDT Phone (Incoming) Maximo Loaiza (Self) [phone] (M)        Alternative phone number: [phone]    Can a detailed message be left? Yes    Message Turnaround: [place]:    Refer to site's KB page for routing instructions    Please give this turnaround time to the caller:   \"You can expect to receive a response 2-3 business days after your provider's clinical team reviews the message\"               no

## 2023-08-31 NOTE — PROGRESS NOTE ADULT - SUBJECTIVE AND OBJECTIVE BOX
TIM MCDANIEL             [id number]    CC: weakness, GOC    HPI:  60 yo G0 w/ known stage IV endometrial carcinoma s/p staging surgery 7/2018 and 1 cycle of chemotherapy in 2/2019 followed by multiple admissions for management of symptomatic enterovaginal fistula, complex fistula associated urinary tract infection, bacteremia presents on referral from [facility] after fever today of 100.7. Pt reports poor PO intake, nausea w/ dry heaving and bringing up phlegm for the past 2 days. She also reports continued weakness. She denies chills, HA, dizziness, CP, palpitations, SOB, abdominal pain, vaginal bleeding, leakage of stool per vagina, abnormal vaginal discharge. She states she was not ambulating daily at [facility] however was working with PT there.    OB/GYN Hx: G0, stage 4 endometrial cancer dx in 7/2018; h/o breast cancer in 2009 s/p chemo and radiation with left breast lumpectomy  PMHx: T2DM, polio in childhood, h/o breast cancer  SHx: left breast lumpectomy, right knee surgery with screw placement 2001, 7/2018 exploratory laparotomy, enterolysis, SHAMIR, BSO, pelvic lymphadenectomy, low anterior resection mobilization of splenic flexure, end colostomy   Allergies: NKDA (29 Apr 2019 23:11)    SUBJECTIVE:  Intervally transferred to [facility] on Dr. Gutierrez's service. Remains fatigued and weak "I am getting weaker" and expresses sadness because she knows she is dying. Was able to eat today and was happy to have visitors including sister and law.     ROS:  DYSPNEA: N	  NAUS/VOM: N  SECRETIONS: N  AGITATION: N  Pain (Y/N): N  -Provocation/Palliation:  -Quality/Quantity:  -Radiating:  -Severity:  -Timing/Frequency:  -Impact on ADLs:    OTHER REVIEW OF SYSTEMS: neg  UNABLE TO OBTAIN  due to:    PEx:  T(C): 36.8 (05-07-19 @ 13:00), Max: 37.6 (05-07-19 @ 05:02)  HR: 98 (05-07-19 @ 11:15) (78 - 98)  BP: 113/57 (05-07-19 @ 11:15) (101/69 - 113/59)  RR: 16 (05-07-19 @ 11:15) (16 - 18)  SpO2: 99% (05-07-19 @ 11:15) (97% - 100%)  Wt(kg): --    General: frail female resting in bed on her side, appears older than stated age, no apparent distress  HEENT: moderate alopecia; conjunctival pallor; nasal cannula in place; hypophonic speech, moist mucous membranes  Neck: supple  CVS: S1/S2, RRR  Resp: on nasal cannula, speaks short sentences but not in distress or using accessory muscles,   GI: soft, non-tender  : joseph removed  Musc: hypotrophic muscularity and bulk, particularly of UE/hands, LE; profound kyphoscoliosis muscle wasting of left trapezius and latissimus dorsi  Neuro: awake and alert  Psych: flat, depressed affect; pleasant  Skin: intact  	     ALLERGIES: No Known Allergies    OPIATE NAÏVE (Y/N): on admission yes; has been receiving opiates as inpatient    MEDICATIONS: REVIEWED  MEDICATIONS  (STANDING):  dextrose 5%. 1000 milliLiter(s) (50 mL/Hr) IV Continuous <Continuous>  dextrose 50% Injectable 25 Gram(s) IV Push once  enoxaparin Injectable 40 milliGRAM(s) SubCutaneous every 24 hours  ertapenem  IVPB 1000 milliGRAM(s) IV Intermittent every 24 hours  insulin lispro (HumaLOG) corrective regimen sliding scale   SubCutaneous Before meals and at bedtime  metFORMIN 1000 milliGRAM(s) Oral two times a day  pantoprazole   Suspension 40 milliGRAM(s) Oral daily  petrolatum Ophthalmic Ointment 1 Application(s) Both EYES three times a day  tamoxifen 20 milliGRAM(s) Oral two times a day  zinc oxide 20% Ointment 1 Application(s) Topical three times a day    MEDICATIONS  (PRN):  acetaminophen   Tablet .. 650 milliGRAM(s) Oral every 6 hours PRN Temp greater or equal to 38C (100.4F), Mild Pain (1 - 3)  glucagon  Injectable 1 milliGRAM(s) IntraMuscular once PRN Glucose LESS THAN 70 milligrams/deciliter  ondansetron Injectable 8 milliGRAM(s) IV Push every 8 hours PRN Nausea and/or Vomiting      LABS: REVIEWED  CBC:                        8.6    6.90  )-----------( 266      ( 07 May 2019 06:52 )             30.3     CMP:    05-07    141  |  96  |  13  ----------------------------<  125<H>  4.7   |  36<H>  |  0.40<L>    Ca    9.5      07 May 2019 06:52  Phos  3.3     05-07  Mg     2.2     05-07    TPro  7.1  /  Alb  3.0<L>  /  TBili  <0.2  /  DBili  x   /  AST  12  /  ALT  15  /  AlkPhos  95  05-07      IMAGING: REVIEWED    ADVANCED DIRECTIVES:     FULL CODE    DECISION MAKER: self  LEGAL SURROGATE: Esha Avila (sister) [phone]    PSYCHOSOCIAL-SPIRITUAL ASSESSMENT:       Reviewed       Care plan unchanged    GOALS OF CARE DISCUSSION       Palliative care info/counseling provided	           Family meeting - planned       Advanced Directives addressed please see Advance Care Planning Note	           See previous Palliative Medicine Note       Documentation of GOC: FULL CODE  	      AGENCY CHOICE DISCUSSED:   remains deferred at this time.    REFERRALS	        Palliative Med        Unit SW/Case Mgmt        - declined       Speech/Swallow       Patient/Family Support       Massage Therapy       Music Therapy       Hospice       Nutrition       PT/OT TIM MCDANIEL             [id number]    CC: weakness, GOC    HPI:  60 yo G0 w/ known stage IV endometrial carcinoma s/p staging surgery 7/2018 and 1 cycle of chemotherapy in 2/2019 followed by multiple admissions for management of symptomatic enterovaginal fistula, complex fistula associated urinary tract infection, bacteremia presents on referral from [facility] after fever today of 100.7. Pt reports poor PO intake, nausea w/ dry heaving and bringing up phlegm for the past 2 days. She also reports continued weakness. She denies chills, HA, dizziness, CP, palpitations, SOB, abdominal pain, vaginal bleeding, leakage of stool per vagina, abnormal vaginal discharge. She states she was not ambulating daily at [facility] however was working with PT there.    OB/GYN Hx: G0, stage 4 endometrial cancer dx in 7/2018; h/o breast cancer in 2009 s/p chemo and radiation with left breast lumpectomy  PMHx: T2DM, polio in childhood, h/o breast cancer  SHx: left breast lumpectomy, right knee surgery with screw placement 2001, 7/2018 exploratory laparotomy, enterolysis, SHAMIR, BSO, pelvic lymphadenectomy, low anterior resection mobilization of splenic flexure, end colostomy   Allergies: NKDA (29 Apr 2019 23:11)    SUBJECTIVE:  Intervally transferred to [facility] on Dr. Gutierrez's service. Remains fatigued and weak "I am getting weaker" and expresses sadness because she knows she is dying. Was able to eat today and was happy to have visitors including sister and law.     ROS:  DYSPNEA: N	  NAUS/VOM: N  SECRETIONS: N  AGITATION: N  Pain (Y/N): N  -Provocation/Palliation:  -Quality/Quantity:  -Radiating:  -Severity:  -Timing/Frequency:  -Impact on ADLs:    OTHER REVIEW OF SYSTEMS: neg  UNABLE TO OBTAIN  due to: generally poor historian    PEx:  T(C): 36.8 (05-07-19 @ 13:00), Max: 37.6 (05-07-19 @ 05:02)  HR: 98 (05-07-19 @ 11:15) (78 - 98)  BP: 113/57 (05-07-19 @ 11:15) (101/69 - 113/59)  RR: 16 (05-07-19 @ 11:15) (16 - 18)  SpO2: 99% (05-07-19 @ 11:15) (97% - 100%)  Wt(kg): --    General: frail female resting in bed having some difficulty holding her head up appears older than stated age, no apparent distress  HEENT: moderate alopecia; conjunctival pallor; nasal cannula in place; hypophonic speech, moist mucous membranes  Neck: supple  CVS: S1/S2, RRR  Resp: on nasal cannula, speaks short sentences but not in distress or using accessory muscles,   GI: soft, non-tender  : joseph removed  Musc: hypotrophic muscularity and bulk, particularly of UE/hands, LE; profound kyphoscoliosis muscle wasting of left trapezius and latissimus dorsi  Neuro: awake and alert  Psych: flat, depressed affect; pleasant  Skin: intact  	     ALLERGIES: No Known Allergies    OPIATE NAÏVE (Y/N): on admission yes; has been receiving opiates as inpatient    MEDICATIONS: REVIEWED  MEDICATIONS  (STANDING):  dextrose 5%. 1000 milliLiter(s) (50 mL/Hr) IV Continuous <Continuous>  dextrose 50% Injectable 25 Gram(s) IV Push once  enoxaparin Injectable 40 milliGRAM(s) SubCutaneous every 24 hours  ertapenem  IVPB 1000 milliGRAM(s) IV Intermittent every 24 hours  insulin lispro (HumaLOG) corrective regimen sliding scale   SubCutaneous Before meals and at bedtime  metFORMIN 1000 milliGRAM(s) Oral two times a day  pantoprazole   Suspension 40 milliGRAM(s) Oral daily  petrolatum Ophthalmic Ointment 1 Application(s) Both EYES three times a day  tamoxifen 20 milliGRAM(s) Oral two times a day  zinc oxide 20% Ointment 1 Application(s) Topical three times a day    MEDICATIONS  (PRN):  acetaminophen   Tablet .. 650 milliGRAM(s) Oral every 6 hours PRN Temp greater or equal to 38C (100.4F), Mild Pain (1 - 3)  glucagon  Injectable 1 milliGRAM(s) IntraMuscular once PRN Glucose LESS THAN 70 milligrams/deciliter  ondansetron Injectable 8 milliGRAM(s) IV Push every 8 hours PRN Nausea and/or Vomiting      LABS: REVIEWED  CBC:                        8.6    6.90  )-----------( 266      ( 07 May 2019 06:52 )             30.3     CMP:    05-07    141  |  96  |  13  ----------------------------<  125<H>  4.7   |  36<H>  |  0.40<L>    Ca    9.5      07 May 2019 06:52  Phos  3.3     05-07  Mg     2.2     05-07    TPro  7.1  /  Alb  3.0<L>  /  TBili  <0.2  /  DBili  x   /  AST  12  /  ALT  15  /  AlkPhos  95  05-07      IMAGING: REVIEWED    ADVANCED DIRECTIVES:     FULL CODE    DECISION MAKER: self  LEGAL SURROGATE: Esha Avila (sister) [phone]    PSYCHOSOCIAL-SPIRITUAL ASSESSMENT:       Reviewed       Care plan unchanged    GOALS OF CARE DISCUSSION       Palliative care info/counseling provided	           Family meeting - planned       Advanced Directives addressed please see Advance Care Planning Note	           See previous Palliative Medicine Note       Documentation of GOC: FULL CODE  	      AGENCY CHOICE DISCUSSED:   remains deferred at this time.    REFERRALS	        Palliative Med        Unit SW/Case Mgmt        - declined       Speech/Swallow       Patient/Family Support       Massage Therapy       Music Therapy       Hospice       Nutrition       PT/OT Protopic Counseling: Patient may experience a mild burning sensation during topical application. Protopic is not approved in children less than 2 years of age. There have been case reports of hematologic and skin malignancies in patients using topical calcineurin inhibitors although causality is questionable.

## 2023-10-12 NOTE — PROGRESS NOTE ADULT - PROBLEM SELECTOR PLAN 10
Operative Note    Name:  Mojgan Jimenez  Location: [facility] OR  Procedure Date:  10/9/2023 - 10/12/2023  PCP:  Franca Mishra    Surgery Performed:  Procedure/Surgery Information   Procedure: Procedure(s):  LAPAROSCOPY, LAPAROTOMY, CLOSURE OF TWO SMALL BOWEL INTEROTOMIES, DRAIN PLACEMENT, INTRA-ABDOMINAL CLEAN OUT   Surgeon(s): Surgeon(s) and Role:     * Hoang Worthy MD - Primary     * Dakota Wallace MD - Assisting  Their assistance was required for exposure and visualization        *  Felecia Henderson CNP;  Second assist;  her assistance was required for exposure and visualization    Specimens: ID Type Source Tests Collected by Time Destination   A : Intra Peritoneal Fluid Swab Peritoneum ANAEROBIC BACTERIAL CULTURE ROUTINE, GRAM STAIN, AEROBIC BACTERIAL CULTURE ROUTINE Hoang Worthy MD 10/12/2023  2:18 PM                Pre-Procedure Diagnosis:  Acute Abdomin, status post recent weight loss surgery    Post-Procedure Diagnosis:    Small bowel enterotomy postop weight loss surgery    Anesthesia:  General     Past Medical History:   Diagnosis Date    LINA (generalized anxiety disorder) 11/02/2017    Hyperlipidemia LDL goal <160 01/20/2019    Major depressive disorder     Methanol abuse (H)     Mild persistent asthma without complication 11/02/2017    Obese     JARVIS on CPAP     Cpap not working    Paranoia (H)     resolved due to drugs    Vitamin D deficiency 11/02/2017       Findings:  There was succus entericus throughout the abdomen with pockets of fluid in the intra-abdominal cavity that was bilious.  There is no foul smell.  2 very small punctate lesions were found in the small bowel common channel about 20 cm distal to the duodenal ileal anastomosis.  The sleeve gastrectomy looked nicely intact  The duodenal to ileal anastomosis looked nicely intact with no evidence of leak.  The duodenal stump looked to be intact without evidence of a leak.    Operative Report:    Patient is brought  to the operating room already intubated.  She was sterilely prepped and draped in the usual surgical fashion and a timeout process was undertaken.    5 mm trocar was advanced into the intra-abdominal cavity to the left of the umbilicus.  This was done with the use of a Optiview trocar.  A pneumoperitoneum was brought to 15 mmHg.  There was evidence of bilious fluid in the intraperitoneal cavity.  Some adhesions were gently swept down with the laparoscope making room to place 3 additional trocars all under direct visualization.  The suction  was advanced into the intra-abdominal cavity and pockets of fluid were aspirated.  Omental adhesions were taken down from the duodenal to ileal anastomosis but there is no evidence of leakage or breakdown at this anastomosis the tissues at the site looked to be healthy.  We then peeled the tissues away from the staple line of the sleeve gastrectomy and that look to be nicely intact.  Under direct visualization of the laparoscope we advanced an NG tube down through the sleeve and across the anastomosis.  We then looked at the duodenal stump that looked to be nicely intact without evidence of leakage.  We did a biliary leak test by giving an injection and watching how it fell through to the liver.  We did not see significant biliary drainage down through the bile ducts or into the gallbladder.  With this I deduced that the injury was likely along the small intestine but given the distention of the bowel within the abdominal cavity there is very little space to work and poor visibility.  Without I decided it was best to move to an open laparotomy.    A midline abdominal incision was made and abdominal fascia was opened with electrocautery.  Advance the digit into the intra-abdominal cavity and used intra-abdominal digits to protect the underlying viscera as I completed the opening of the abdominal wall which extended down below the level of the umbilicus.  The omental  tissues were gently dissected free from the abdominal wall with digital dissection.  Pockets of bilious fluid were encountered and these were aspirated.  The abdominal cavity was irrigated effluent was aspirated until the abdominal cavity was clean.  I then reevaluated the placement of the NG tube and could appreciate that it was nicely situated across the duodenal ileal anastomosis.  I ran the bowel from that anastomosis down to the colon and 20 to 30 cm distal to the anastomosis I found to small 3 mm punctate lesions on the small intestine.  1 of these lesions was draining succus entericus when I drew fluid back across that area by manipulating the small intestine.  I closed each of the small openings with a series of interrupted Lembert 3-0 Vicryl sutures.  I further covered those enterotomy closures with nearby mesenteric fat.  I then ran the small bowel all the way to the terminal ileum and did not see any further injury to the small intestine.  I irrigated the abdominal cavity with several liters of warm normal saline and aspirated effluent until clear.  I placed 2,  19 Audie drains 1 which runs up from the pelvis and exited in the left upper quadrant.  The other exits in the right upper quadrant and runs up past the duodenal stump and the duodenal anastomosis as well as a sleeve gastrectomy.     We changed our gloves and the abdominal cavity was closed with a running #1 looped PDS suture.  The subtenons tissues were irrigated with another liter of warm normal saline and a 19 Audie drain was placed in the base of that abdominal wound.  Two 3-0 Vicryl sutures were placed to approximate the fatty tissue above the drain.  Each of the drains were sutured to skin with interrupted 2-0 silk sutures placed in a Lembert fashion.  The skin was closed with staples.  The wounds were dressed and the patient was taken back to the intensive care unit.      Estimated Blood Loss:   20 cc       Drains:   Closed/Suction Drain 1  Right RUQ Bulb 19 Bhutanese (Active)   Site Description WDL 10/12/23 1700   Dressing Status Normal: Clean, Dry & Intact 10/12/23 1700   Drainage Appearance Bloody/Bright Red 10/12/23 1700   Status To bulb suction 10/12/23 1700   Output (ml) 35 ml 10/12/23 1540       Closed/Suction Drain 2 Left LUQ Bulb 19 Bhutanese (Active)   Site Description [facility] 10/12/23 1700   Dressing Status Normal: Clean, Dry & Intact 10/12/23 1700   Drainage Appearance Bloody/Bright Red 10/12/23 1700   Status To bulb suction 10/12/23 1700   Output (ml) 35 ml 10/12/23 1540       Closed/Suction Drain 3 Left LLQ Bulb 19 Bhutanese (Active)   Site Description [facility] 10/12/23 1700   Dressing Status Normal: Clean, Dry & Intact 10/12/23 1700   Drainage Appearance Bloody/Bright Red 10/12/23 1700   Status To bulb suction 10/12/23 1700   Output (ml) 15 ml 10/12/23 1540       NG/OG/NJ Tube Nasogastric 16 fr Left nostril (Active)   Status Suction-low intermittent 10/12/23 1700   Drainage Appearance Bile 10/12/23 1700   [place] (cm marking) at nare/mouth 70 cm 10/12/23 1700       Urethral Catheter 10/12/23 Latex 16 fr (Active)   Tube Description UTV 10/12/23 1700   Catheter Care Catheter wipes 10/12/23 1700   Collection Container Standard 10/12/23 1700   Securement Method Securing device (Describe) 10/12/23 1700   Rationale for Continued Use Strict 1-2 Hour I&O 10/12/23 1700   Urine Output 75 mL 10/12/23 1540       Implants:  3 drains; 19 Audie drains    Complications:    None    Hoang Worthy MD     Date: 10/12/2023  Time: 6:39 PM    Support provided to patient and family. Patient to have access to supportive services during rest of hospital stay as the patient/family deemed necessary ie. Chaplaincy, Massage therapy, Music therapy, Patient and family supportive services, Palliative SW, etc. as identified during the patients PSSA screening the patient would benefit from: continued visits from Patient and Family Support counselor and massage therapy.    Awaiting to hear from Phillip Kessler regarding LTAC bed

## 2023-10-31 NOTE — PROCEDURE NOTE - PRACTITIONER PERFORMING THE TIME OUT
Amando, NP
Cassandra Gayle
Dr Field
Dr King Hansen
Dr. Field
Dr. Field
Sudeep Vincent
[facility]
oral

## 2024-02-01 NOTE — CONSULT NOTE ADULT - PROBLEM SELECTOR RECOMMENDATION 9
LOV 12/27/2023  Upcoming Appointment 03/06/2024  metformin (GLUCOPHAGE) 1000 MG tablet 180 tablet 0 12/6/2023 --    Sig - Route: TAKE 1 TABLET BY MOUTH IN THE MORNING AND IN THE EVENING WITH MEALS - Oral      glimepiride (AMARYL) 4 MG tablet 180 tablet 0 11/8/2023 --    Sig: Take 1 tab in the am and half tab in evening with meals.     Rx e prescribed - At time of assessment, patient was on room air and without subjective or objective evidence of shortness of breath/respiratory difficulty.   - CXR does illustrate what is read as mild-moderate R sided pleural effusion, however, on POC Ultrasound examination, the R sided fluid collection is minimal - no indication for thoracentesis. - At time of assessment, patient was on room air and without subjective or objective evidence of shortness of breath/respiratory difficulty.   - CXR does illustrate what is read as mild-moderate R sided pleural effusion, however, on POC Ultrasound examination, the R sided fluid collection is minimal - no indication for thoracentesis.  - Continue with antibiotic therapy at this time per ID reccs  - Effusion unlikely to be the reason for the dyspnea  - Recommend patient to be OOB and ambulate as tolerated  - Patient educated on incentive spirometer use  - Thank you for the consult, we will continue to follow. - At time of assessment, patient was on room air and without subjective or objective evidence of shortness of breath/respiratory difficulty.   - CXR does illustrate what is read as mild-moderate R sided pleural effusion, however, on POC Ultrasound examination, the R sided fluid collection is minimal - no indication for thoracentesis.  - Continue with antibiotic therapy at this time per ID reccs  - Effusion unlikely to be the reason for the dyspnea  - Recommend patient to be OOB and ambulate as tolerated  - Patient educated on incentive spirometer use  - Recommend a CT chest to further evaluate intraparenchymal process  - Thank you for the consult, we will continue to follow. Hpi Title: Evaluation of Skin Lesions How Severe Are Your Spot(S)?: mild Location: Father

## 2024-09-28 NOTE — BEHAVIORAL HEALTH ASSESSMENT NOTE - [facility] ROS RESPIRATORY
Physical Therapy    Visit Type: treatment  SUBJECTIVE  Patient agreed to participate in therapy this date.  RN in agreement to work with patient for therapy session.  \" I am doing ok\"  Patient / Family Goal: return home, maximize function and return to previous functional status    Pain   Patient denies pain., Patient does not demonstrate pain behaviors.    At onset of session (out of 10): 0     OBJECTIVE     Cognitive Status   Level of Consciousness   - alert  Orientation    - Oriented to: person, place, time and situation    Respiratory:   Patient is on RA        Standing Balance  (KHADRA = base of support)  Standing Balance Static=  Fair with IV Pole   Standing Balance Dynamic= Fair-/Poor+ with IV ole        Bed Mobility  - Supine to sit: modified independent  - Sit to supine: modified independent  Transfers  Assistive devices: gait belt, none  - Sit to stand: supervision  - Stand to sit: supervision    Ambulation / Gait  - Assistive device: gait belt (IV Pole)  - Distance (feet unless otherwise indicated): 200  - Assist Level: contact guard/touching/steadying assist and supervision  - Surface: even  - Description: decreased bridget/pace, decreased step length left and decreased step length right     Interventions     Seated    Lower Extremity: Bilateral: seated hip flexion, knee flexion, toe raises, heel raises and knee extensions, AROM, 10 reps, 1 sets  Training provided: activity tolerance, balance retraining, safety training, gait training, positioning, transfer training, use of assistive device and stair training    Skilled input: Verbal instruction/cues  Verbal Consent: Writer verbally educated and received verbal consent for hand placement, positioning of patient, and techniques to be performed today from patient for clothing adjustments for techniques as described above and how they are pertinent to the patient's plan of care.         Education:   - Present and ready to learn: patient  Education provided  during session:  - Results of above outlined education: Verbalizes understanding and Demonstrates understanding    ASSESSMENT   Progress: progressing toward goals  Interfering components: decreased activity tolerance and medical status limitations    Discharge needs based on today's assessment:   - Current level of function: slightly below baseline level of function   - Therapy needs at discharge: therapy 1-3 times per week   - Activities of daily living (ADLs) requiring support at discharge: bed mobility, transfers, ambulation and stairs   - Impairments that require further therapy intervention: activity tolerance, balance, coordination and safety awareness    AM-PAC  - Generalized Prior Level of Function: IND/MOD I ([facility] 22-24)       Key: MOD A=moderate assistance, IND/MOD I=independent/modified independent  - Generalized Current Level of Function     - Current Mobility Score: 20       AM-PAC Scoring Key= >21 Modified Independent; 20-21 Supervision; 18-19 Minimal assist; 13-17 Moderate assist; 9-12 Max assist; <9 Total assist      Pain at End of Session:   Pain: 0/10    PLAN (while hospitalized)  Suggestions for next session as indicated: To Continue with current PT POC   PT Frequency: 3-5 x per week      PT/OT Mobility Equipment for Discharge: TBD  PT/OT ADL Equipment for Discharge: no further recommendations.  Agreement to plan and goals: patient agrees with goals and treatment plan        GOALS  Long Term Goals: (to be met by time of discharge from hospital)  Sit to stand: Patient will complete sit to stand transfer with gait belt and 2-wheeled walker, modified independent.   Status: progressing/ongoing  Stand to sit: Patient will complete stand to sit transfer with gait belt and 2-wheeled walker, modified independent.   Status: progressing/ongoing  Ambulation (even): Patient will ambulate on even surface for 200 feet with gait belt and 2-wheeled walker, modified independent.   Status:  progressing/ongoing  Flight of stairs: Patient will ambulate a flight of stairs with gait belt using 1 rail, modified independent.   Status: progressing/ongoing  Documented in the chart in the following areas: Prior Level of Function. Assessment/Plan.      Patient at End of Session:   Location: in bed  Safety measures: alarm system in place/re-engaged, call light within reach and equipment intact  Handoff to: nurse      Therapy procedure time and total treatment time can be found documented on the Time Entry flowsheet   No complaints

## 2024-11-11 NOTE — PROGRESS NOTE ADULT - ATTENDING COMMENTS
Plasma exchange completed. Please contact me to set up a date for the next admission. Treatment Goal Explanation (Does Not Render In The Note): Stable for the purposes of categorizing medical decision making is defined by the specific treatment goals for an individual patient. A patient that is not at their treatment goal is not stable, even if the condition has not changed and there is no short- term threat to life or function.

## 2024-11-18 NOTE — PROGRESS NOTE ADULT - PROBLEM SELECTOR PLAN 4
Likely 2/2 immunocompromised state.  - continue Nystatin oral solution. Reinforced need to spit out and not to swallow
Likely 2/2 immunocompromised state.  - continue Nystatin oral solution. Reinforced need to spit out and not to swallow
No nausea/vomiting today. Resolved.   Improved, likely 2/2 opioids.   Patient educated this is expected initially with opioids medications, symptom should gradually continue to improve in next few days.   - Consider ant-emetic as needed for nausea/vomiting if symptoms recur/remain.
Presented with fecal-urinary incontinence- MRI findings suggestive of enterovaginal fistula.   -seen by Surgery team with no plans for acute intervention at this time
Resolved. No further nausea/vomiting.   Improved, likely 2/2 opioids.   Patient educated this is expected initially with opioids medications, symptom should gradually continue to improve in next few days.   - Consider ant-emetic as needed for nausea/vomiting if symptoms recur/remain.
No nausea/vomiting today.   Improved, likely 2/2 opioids.   Patient educated this is expected initially with opioids medications, symptom should gradually continue to improve in next few days.   - Consider ant-emetic as needed for nausea/vomiting if symptoms recur/remain.
no
